# Patient Record
Sex: FEMALE | Race: WHITE
[De-identification: names, ages, dates, MRNs, and addresses within clinical notes are randomized per-mention and may not be internally consistent; named-entity substitution may affect disease eponyms.]

---

## 2017-05-23 ENCOUNTER — HOSPITAL ENCOUNTER (OUTPATIENT)
Dept: HOSPITAL 75 - ONC | Age: 67
LOS: 90 days | Discharge: HOME | End: 2017-08-21
Attending: INTERNAL MEDICINE
Payer: MEDICARE

## 2017-05-23 DIAGNOSIS — F32.9: ICD-10-CM

## 2017-05-23 DIAGNOSIS — Z85.43: ICD-10-CM

## 2017-05-23 DIAGNOSIS — Z79.899: ICD-10-CM

## 2017-05-23 DIAGNOSIS — Z08: Primary | ICD-10-CM

## 2017-05-23 DIAGNOSIS — N18.3: ICD-10-CM

## 2017-05-23 LAB
ALBUMIN SERPL-MCNC: 4.2 G/DL (ref 3.2–4.5)
ALT SERPL-CCNC: 15 U/L (ref 0–55)
ANION GAP SERPL CALC-SCNC: 14 MMOL/L (ref 5–14)
AST SERPL-CCNC: 19 U/L (ref 5–34)
BASOPHILS # BLD AUTO: 0 10^3/UL (ref 0–0.1)
BASOPHILS NFR BLD AUTO: 0 % (ref 0–10)
BILIRUB SERPL-MCNC: 0.3 MG/DL (ref 0.1–1)
BUN SERPL-MCNC: 32 MG/DL (ref 7–18)
BUN/CREAT SERPL: 17
CALCIUM SERPL-MCNC: 10.2 MG/DL (ref 8.5–10.1)
CHLORIDE SERPL-SCNC: 97 MMOL/L (ref 98–107)
CO2 SERPL-SCNC: 29 MMOL/L (ref 21–32)
CREAT SERPL-MCNC: 1.92 MG/DL (ref 0.6–1.3)
EOSINOPHIL # BLD AUTO: 0 10^3/UL (ref 0–0.3)
EOSINOPHIL NFR BLD AUTO: 0 % (ref 0–10)
ERYTHROCYTE [DISTWIDTH] IN BLOOD BY AUTOMATED COUNT: 14.5 % (ref 10–14.5)
GFR SERPLBLD BASED ON 1.73 SQ M-ARVRAT: 26 ML/MIN
GLUCOSE SERPL-MCNC: 124 MG/DL (ref 70–105)
LYMPHOCYTES # BLD AUTO: 1.3 X 10^3 (ref 1–4)
LYMPHOCYTES NFR BLD AUTO: 17 % (ref 12–44)
MAGNESIUM SERPL-MCNC: 1.9 MG/DL (ref 1.8–2.4)
MCH RBC QN AUTO: 33 PG (ref 25–34)
MCHC RBC AUTO-ENTMCNC: 33 G/DL (ref 32–36)
MCV RBC AUTO: 101 FL (ref 80–99)
MONOCYTES # BLD AUTO: 0.8 X 10^3 (ref 0–1)
MONOCYTES NFR BLD AUTO: 11 % (ref 0–12)
NEUTROPHILS # BLD AUTO: 5.3 X 10^3 (ref 1.8–7.8)
NEUTROPHILS NFR BLD AUTO: 71 % (ref 42–75)
PLATELET # BLD: 300 10^3/UL (ref 130–400)
PMV BLD AUTO: 9.9 FL (ref 7.4–10.4)
POTASSIUM SERPL-SCNC: 3 MMOL/L (ref 3.6–5)
PROT SERPL-MCNC: 8.1 G/DL (ref 6.4–8.2)
RBC # BLD AUTO: 3.97 10^6/UL (ref 4.35–5.85)
SODIUM SERPL-SCNC: 140 MMOL/L (ref 135–145)
WBC # BLD AUTO: 7.4 10^3/UL (ref 4.3–11)

## 2017-05-23 PROCEDURE — 80053 COMPREHEN METABOLIC PANEL: CPT

## 2017-05-23 PROCEDURE — 86304 IMMUNOASSAY TUMOR CA 125: CPT

## 2017-05-23 PROCEDURE — 83735 ASSAY OF MAGNESIUM: CPT

## 2017-05-23 PROCEDURE — 99213 OFFICE O/P EST LOW 20 MIN: CPT

## 2017-05-23 PROCEDURE — 36591 DRAW BLOOD OFF VENOUS DEVICE: CPT

## 2017-05-23 PROCEDURE — 85025 COMPLETE CBC W/AUTO DIFF WBC: CPT

## 2018-06-06 ENCOUNTER — HOSPITAL ENCOUNTER (OUTPATIENT)
Dept: HOSPITAL 75 - ONC | Age: 68
LOS: 90 days | Discharge: HOME | End: 2018-09-04
Attending: INTERNAL MEDICINE
Payer: MEDICARE

## 2018-06-06 DIAGNOSIS — Z79.899: ICD-10-CM

## 2018-06-06 DIAGNOSIS — Z85.43: ICD-10-CM

## 2018-06-06 DIAGNOSIS — F32.9: ICD-10-CM

## 2018-06-06 DIAGNOSIS — Z08: Primary | ICD-10-CM

## 2018-06-06 DIAGNOSIS — Z45.2: ICD-10-CM

## 2018-06-06 DIAGNOSIS — N18.3: ICD-10-CM

## 2018-06-06 LAB
ALBUMIN SERPL-MCNC: 3.7 GM/DL (ref 3.2–4.5)
ALP SERPL-CCNC: 91 U/L (ref 40–136)
ALT SERPL-CCNC: 21 U/L (ref 0–55)
BASOPHILS # BLD AUTO: 0 10^3/UL (ref 0–0.1)
BASOPHILS NFR BLD AUTO: 0 % (ref 0–10)
BILIRUB SERPL-MCNC: 0.2 MG/DL (ref 0.1–1)
BUN/CREAT SERPL: 12
CALCIUM SERPL-MCNC: 9.2 MG/DL (ref 8.5–10.1)
CHLORIDE SERPL-SCNC: 110 MMOL/L (ref 98–107)
CO2 SERPL-SCNC: 25 MMOL/L (ref 21–32)
CREAT SERPL-MCNC: 1.37 MG/DL (ref 0.6–1.3)
EOSINOPHIL # BLD AUTO: 0.1 10^3/UL (ref 0–0.3)
EOSINOPHIL NFR BLD AUTO: 3 % (ref 0–10)
ERYTHROCYTE [DISTWIDTH] IN BLOOD BY AUTOMATED COUNT: 12.6 % (ref 10–14.5)
GFR SERPLBLD BASED ON 1.73 SQ M-ARVRAT: 38 ML/MIN
GLUCOSE SERPL-MCNC: 90 MG/DL (ref 70–105)
HCT VFR BLD CALC: 35 % (ref 35–52)
HGB BLD-MCNC: 12.1 G/DL (ref 11.5–16)
LYMPHOCYTES # BLD AUTO: 1.1 X 10^3 (ref 1–4)
LYMPHOCYTES NFR BLD AUTO: 24 % (ref 12–44)
MANUAL DIFFERENTIAL PERFORMED BLD QL: NO
MCH RBC QN AUTO: 35 PG (ref 25–34)
MCHC RBC AUTO-ENTMCNC: 34 G/DL (ref 32–36)
MCV RBC AUTO: 101 FL (ref 80–99)
MONOCYTES # BLD AUTO: 0.4 X 10^3 (ref 0–1)
MONOCYTES NFR BLD AUTO: 8 % (ref 0–12)
NEUTROPHILS # BLD AUTO: 2.9 X 10^3 (ref 1.8–7.8)
NEUTROPHILS NFR BLD AUTO: 65 % (ref 42–75)
PLATELET # BLD: 203 10^3/UL (ref 130–400)
PMV BLD AUTO: 9.5 FL (ref 7.4–10.4)
POTASSIUM SERPL-SCNC: 4.1 MMOL/L (ref 3.6–5)
PROT SERPL-MCNC: 7 GM/DL (ref 6.4–8.2)
RBC # BLD AUTO: 3.49 10^6/UL (ref 4.35–5.85)
SODIUM SERPL-SCNC: 142 MMOL/L (ref 135–145)
WBC # BLD AUTO: 4.4 10^3/UL (ref 4.3–11)

## 2018-06-06 PROCEDURE — 85025 COMPLETE CBC W/AUTO DIFF WBC: CPT

## 2018-06-06 PROCEDURE — 86304 IMMUNOASSAY TUMOR CA 125: CPT

## 2018-06-06 PROCEDURE — 80053 COMPREHEN METABOLIC PANEL: CPT

## 2018-06-06 PROCEDURE — 36415 COLL VENOUS BLD VENIPUNCTURE: CPT

## 2018-06-06 PROCEDURE — 96523 IRRIG DRUG DELIVERY DEVICE: CPT

## 2019-06-06 ENCOUNTER — HOSPITAL ENCOUNTER (OUTPATIENT)
Dept: HOSPITAL 75 - ONC | Age: 69
LOS: 90 days | Discharge: HOME | End: 2019-09-04
Attending: INTERNAL MEDICINE
Payer: MEDICARE

## 2019-06-06 DIAGNOSIS — Z79.899: ICD-10-CM

## 2019-06-06 DIAGNOSIS — N18.3: ICD-10-CM

## 2019-06-06 DIAGNOSIS — F32.9: ICD-10-CM

## 2019-06-06 DIAGNOSIS — Z85.43: ICD-10-CM

## 2019-06-06 DIAGNOSIS — Z08: Primary | ICD-10-CM

## 2019-06-06 LAB
ALBUMIN SERPL-MCNC: 4 GM/DL (ref 3.2–4.5)
ALP SERPL-CCNC: 96 U/L (ref 40–136)
ALT SERPL-CCNC: 20 U/L (ref 0–55)
BASOPHILS # BLD AUTO: 0 10^3/UL (ref 0–0.1)
BASOPHILS NFR BLD AUTO: 0 % (ref 0–10)
BILIRUB SERPL-MCNC: 0.2 MG/DL (ref 0.1–1)
BUN/CREAT SERPL: 11
CALCIUM SERPL-MCNC: 9.5 MG/DL (ref 8.5–10.1)
CHLORIDE SERPL-SCNC: 106 MMOL/L (ref 98–107)
CO2 SERPL-SCNC: 26 MMOL/L (ref 21–32)
CREAT SERPL-MCNC: 1.39 MG/DL (ref 0.6–1.3)
EOSINOPHIL # BLD AUTO: 0.2 10^3/UL (ref 0–0.3)
EOSINOPHIL NFR BLD AUTO: 3 % (ref 0–10)
ERYTHROCYTE [DISTWIDTH] IN BLOOD BY AUTOMATED COUNT: 12.7 % (ref 10–14.5)
GFR SERPLBLD BASED ON 1.73 SQ M-ARVRAT: 38 ML/MIN
GLUCOSE SERPL-MCNC: 168 MG/DL (ref 70–105)
HCT VFR BLD CALC: 36 % (ref 35–52)
HGB BLD-MCNC: 11.7 G/DL (ref 11.5–16)
LYMPHOCYTES # BLD AUTO: 1.4 X 10^3 (ref 1–4)
LYMPHOCYTES NFR BLD AUTO: 26 % (ref 12–44)
MANUAL DIFFERENTIAL PERFORMED BLD QL: NO
MCH RBC QN AUTO: 34 PG (ref 25–34)
MCHC RBC AUTO-ENTMCNC: 33 G/DL (ref 32–36)
MCV RBC AUTO: 103 FL (ref 80–99)
MONOCYTES # BLD AUTO: 0.4 X 10^3 (ref 0–1)
MONOCYTES NFR BLD AUTO: 8 % (ref 0–12)
NEUTROPHILS # BLD AUTO: 3.5 X 10^3 (ref 1.8–7.8)
NEUTROPHILS NFR BLD AUTO: 63 % (ref 42–75)
PLATELET # BLD: 214 10^3/UL (ref 130–400)
PMV BLD AUTO: 9.1 FL (ref 7.4–10.4)
POTASSIUM SERPL-SCNC: 3.7 MMOL/L (ref 3.6–5)
PROT SERPL-MCNC: 7.2 GM/DL (ref 6.4–8.2)
SODIUM SERPL-SCNC: 142 MMOL/L (ref 135–145)
WBC # BLD AUTO: 5.5 10^3/UL (ref 4.3–11)

## 2019-06-06 PROCEDURE — 80053 COMPREHEN METABOLIC PANEL: CPT

## 2019-06-06 PROCEDURE — 86304 IMMUNOASSAY TUMOR CA 125: CPT

## 2019-06-06 PROCEDURE — 85025 COMPLETE CBC W/AUTO DIFF WBC: CPT

## 2019-06-06 PROCEDURE — 99213 OFFICE O/P EST LOW 20 MIN: CPT

## 2019-06-18 ENCOUNTER — HOSPITAL ENCOUNTER (OUTPATIENT)
Dept: HOSPITAL 75 - RAD | Age: 69
End: 2019-06-18
Attending: SURGERY
Payer: MEDICARE

## 2019-06-18 DIAGNOSIS — Z45.2: Primary | ICD-10-CM

## 2019-06-18 PROCEDURE — 71046 X-RAY EXAM CHEST 2 VIEWS: CPT

## 2019-06-18 NOTE — DIAGNOSTIC IMAGING REPORT
INDICATION: Pre port removal.



TIME OF EXAM: 03:16 p.m.



Correlation is made with prior chest from 03/23/2005.



FINDINGS: Left chest wall port has tip overlying the SVC. The

lungs are clear. The pulmonary vascularity is normal. No

infiltrate, effusion, or pneumothorax is detected.



IMPRESSION: No acute cardiopulmonary process is detected.



Dictated by: 



  Dictated on workstation # [id number]

## 2019-06-24 ENCOUNTER — HOSPITAL ENCOUNTER (OUTPATIENT)
Dept: HOSPITAL 75 - PREOP | Age: 69
LOS: 1 days | Discharge: HOME | End: 2019-06-25
Attending: SURGERY
Payer: MEDICARE

## 2019-06-24 VITALS — HEIGHT: 66 IN | WEIGHT: 149.37 LBS | BODY MASS INDEX: 24 KG/M2

## 2019-06-24 DIAGNOSIS — Z01.818: Primary | ICD-10-CM

## 2019-06-26 ENCOUNTER — HOSPITAL ENCOUNTER (OUTPATIENT)
Dept: HOSPITAL 75 - SDC | Age: 69
Discharge: HOME | End: 2019-06-26
Attending: SURGERY
Payer: MEDICARE

## 2019-06-26 VITALS — SYSTOLIC BLOOD PRESSURE: 121 MMHG | DIASTOLIC BLOOD PRESSURE: 82 MMHG

## 2019-06-26 VITALS — DIASTOLIC BLOOD PRESSURE: 67 MMHG | SYSTOLIC BLOOD PRESSURE: 122 MMHG

## 2019-06-26 VITALS — DIASTOLIC BLOOD PRESSURE: 77 MMHG | SYSTOLIC BLOOD PRESSURE: 128 MMHG

## 2019-06-26 VITALS — DIASTOLIC BLOOD PRESSURE: 67 MMHG | SYSTOLIC BLOOD PRESSURE: 124 MMHG

## 2019-06-26 VITALS — DIASTOLIC BLOOD PRESSURE: 66 MMHG | SYSTOLIC BLOOD PRESSURE: 127 MMHG

## 2019-06-26 VITALS — HEIGHT: 66 IN | WEIGHT: 149.37 LBS | BODY MASS INDEX: 24 KG/M2

## 2019-06-26 VITALS — DIASTOLIC BLOOD PRESSURE: 67 MMHG | SYSTOLIC BLOOD PRESSURE: 121 MMHG

## 2019-06-26 VITALS — SYSTOLIC BLOOD PRESSURE: 141 MMHG | DIASTOLIC BLOOD PRESSURE: 66 MMHG

## 2019-06-26 DIAGNOSIS — Z79.82: ICD-10-CM

## 2019-06-26 DIAGNOSIS — E78.5: ICD-10-CM

## 2019-06-26 DIAGNOSIS — K63.5: ICD-10-CM

## 2019-06-26 DIAGNOSIS — N18.3: ICD-10-CM

## 2019-06-26 DIAGNOSIS — Z93.3: ICD-10-CM

## 2019-06-26 DIAGNOSIS — D12.6: ICD-10-CM

## 2019-06-26 DIAGNOSIS — Z11.2: ICD-10-CM

## 2019-06-26 DIAGNOSIS — F32.9: ICD-10-CM

## 2019-06-26 DIAGNOSIS — T82.898A: Primary | ICD-10-CM

## 2019-06-26 DIAGNOSIS — K21.9: ICD-10-CM

## 2019-06-26 DIAGNOSIS — Z79.899: ICD-10-CM

## 2019-06-26 DIAGNOSIS — E78.00: ICD-10-CM

## 2019-06-26 DIAGNOSIS — M10.9: ICD-10-CM

## 2019-06-26 DIAGNOSIS — C78.6: ICD-10-CM

## 2019-06-26 PROCEDURE — 87081 CULTURE SCREEN ONLY: CPT

## 2019-06-26 NOTE — XMS REPORT
Patient Summary (HL7 CCD)

                             Created on: 2017



YARELY ADAME

External Reference #: [id number]

: 1950

Sex: Female



Demographics







                          Address                   [address]

[place]  [address]

 

                          Home Phone                [phone]

 

                          Preferred Language        Unknown

 

                          Marital Status            Unknown

 

                          Advent Affiliation     Unknown

 

                          Race                      White

 

                          Ethnic Group              Not  or 





Author







                          Author                    MANNIE GORDON              Unknown

 

                          Address                   [address]

[place]  [address]



 

                          Phone                     [phone]







Care Team Providers







                    Care Team Member Name    Role                Phone

 

                    ALCON HAMILTON, BLANK BABIN    Attphys             [phone]

 

                    Kirksey EDOUARD, NAIF VILLALOBOS    Prisurg             [phone]

 

                    J., MANNIE          NASST               [phone]

 

                    C., CIRILO          NASST               [phone]

 

                    S., AVELINA LE     NASST               [phone]

 

                    R., BJ          NASST               [phone]

 

                    A., RAJIV          NASST               [phone]

 

                    O., NIDIA ROUSSEAU        NASST               [phone]

 

                    G., ELLYN            NASST               [phone]



                                                                



Allergies

          





             Allergy          Code          Allergy Type          Reaction          Status    

 

             No Known Allergies          0            Drug allergy                       Active    



                                                                                
       



Active Medications

          





           Medication          Code          Dose          Units          Frequency          Route   

                          Modification              Start Date/Time    

 

             Latuda 20MG Oral Tablet          4746705          20           MILLIGRAMS          DAILY 

                    ORAL                                    2016 14:14    

 

                          Prescription Detail           20 MILLIGRAMS ORAL DAILY     

 

             Reglan 10MG Oral Tablet          487247          1            TABLET          TWICE WITH MEALS

                    BY MOUTH                                2016 14:14    

 

                          Prescription Detail           1 TABLET BY MOUTH TWICE WITH MEALS     

 

             Zoloft 100MG Oral Tablet          880871          150          MILLIGRAMS          DAILY

                    ORAL                                    2016 14:14    

 

                          Prescription Detail           150 MILLIGRAMS ORAL DAILY     

 

             Pravastatin 40MG Oral Tablet          146664          40           MILLIGRAMS          AT

 BEDTIME            ORAL                                    2014 13:00    

 

                          Prescription Detail           40 MILLIGRAMS ORAL AT BEDTIME      

 

                Vitamin B12 100MCG/1ML Intramuscular Solution          415276          1               EACH

                MONTHLY          INTRAMUSCULAR                          2014 13:00    

 

                          Prescription Detail           1 EACH INTRAMUSCULAR MONTHLY      

 

                Vitamin D 1000IU Oral Tablet          022532          2000            INTERNATIONAL UNITS

                DAILY           ORAL                            2014 13:00    

 

                          Prescription Detail           2000 INTERNATIONAL UNITS ORAL DAILY      



                                                                                
                                                         



Problems

          





             Problem          Code          Start Date          Resolved Date          Status    

 

             Ileus          42236226          2016                       Active    

 

             Abdominal pain          45500032          2016                       Active    



                                                                                
                 



Procedures

          





                Procedure          Code            Procedure Type          Date    

 

                ABDOMEN ACUTE SERIES          7275433          [facility]          2016    

 

                COMPREHENSIVE METABOLIC PANEL          305231588          [facility]          2016

    

 

                CBC W/ AUTO DIFF (RFLX MAN DIFF IF IND)          8490513          [facility]          2016

    

 

                LIPASE          40092407          [facility]          2016    

 

                LACTIC ACID          8907623          [facility]          2016    

 

                C REACTIVE PROTEIN          07935592          [facility]          2016    

 

                COMPREHENSIVE METABOLIC PANEL          336511977          [facility]          2016

    

 

                CBC W/ AUTO DIFF (RFLX MAN DIFF IF IND)          2405390          [facility]          2016

    

 

                ^CBC W/AUTO DIFF          0847593          [facility]          2016    

 

                ^CBC W/AUTO DIFF          3026449          [facility]          2016    



                                                                                
                                                                                
                



Results

          







                                        COMPREHENSIVE METABOLIC PANEL - Collect Date/Time: 2016 06:00     

 

             Test Name          Code          Test Result          Test Units          Test Ref Range

    

 

             GLUCOSE          2345-7          91           MG/DL          L=70       H=100    

 

             SODIUM          2951-2          143          MEQ/L          L=135      H=148    

 

             POTASSIUM          2823-3          3.6          MEQ/L          L=3.5      H=5.3    

 

             CHLORIDE          2075-0          112          MEQ/L          L=96       H=110    

 

             CO2          2028-9          23           MEQ/L          L=22       H=29    

 

             BUN          3094-0          22           MG/DL          L=8        H=22    

 

             CREATININE          2160-0          1.2          MG/DL          L=0.6      H=1.6    

 

             SGOT/AST          1920-8          15           IU/L          L=10       H=40    

 

             SGPT/ALT          1742-6          12           IU/L          L=8        H=54    

 

             ALK PHOS          6768-6          61           IU/L          L=35       H=115    

 

             TOTAL PROTEIN          2885-2          5.4          G/DL          L=5.5      H=8.5    

 

             ALBUMIN          1751-7          3.1          G/DL          L=3.1      H=5.4    

 

             TOTAL BILI          1975-2          0.4          MG/DL          L=0.0      H=1.5    

 

             CALCIUM          47746-2          8.4          MG/DL          L=8.2      H=10.6    

 

             AGE          [birth year]67-7          66           yrs               

 

             GFR NonAA          60506-3          45                             

 

             GFR AA          17916-8          55                             

 

             eGFR          84000-8          45           mL/min/1.7               

 

             eGFR AA*          72378-7          55           mL/min/1.7               











                                        COMPREHENSIVE METABOLIC PANEL - Collect Date/Time: 2016 06:55     

 

             Test Name          Code          Test Result          Test Units          Test Ref Range

    

 

             GLUCOSE          2345-7          186          MG/DL          L=70       H=100    

 

             SODIUM          2951-2          142          MEQ/L          L=135      H=148    

 

             POTASSIUM          2823-3          4.2          MEQ/L          L=3.5      H=5.3    

 

             CHLORIDE          2075-0          100          MEQ/L          L=96       H=110    

 

             CO2          2028-9          24           MEQ/L          L=22       H=29    

 

             BUN          3094-0          33           MG/DL          L=8        H=22    

 

             CREATININE          2160-0          1.9          MG/DL          L=0.6      H=1.6    

 

             SGOT/AST          1920-8          20           IU/L          L=10       H=40    

 

             SGPT/ALT          1742-6          21           IU/L          L=8        H=54    

 

             ALK PHOS          6768-6          96           IU/L          L=35       H=115    

 

             TOTAL PROTEIN          2885-2          8.7          G/DL          L=5.5      H=8.5    

 

             ALBUMIN          1751-7          4.5          G/DL          L=3.1      H=5.4    

 

             TOTAL BILI          1975-2          0.6          MG/DL          L=0.0      H=1.5    

 

             CALCIUM          92122-9          10.7          MG/DL          L=8.2      H=10.6    

 

             AGE                       66           yrs               

 

             GFR NonAA                       26                             

 

             GFR AA                       32                             

 

             eGFR                       26           mL/min/1.7               

 

             eGFR AA*                       32           mL/min/1.7               











                                        LIPASE - Collect Date/Time: 2016 06:55     

 

             Test Name          Code          Test Result          Test Units          Test Ref Range

    

 

             LIPASE          3040-3          20           U/L          L=8        H=78    











                                        CBC W/ AUTO DIFF (RFLX MAN DIFF IF IND) - Collect Date/Time: 2016 06:00   

  

 

             Test Name          Code          Test Result          Test Units          Test Ref Range

    

 

             WBC          57594-8          3.0          TH/CMM          L=4.5      H=10.8    

 

             RBC          789-8          3.05          ML/CMM          L=4.20     H=5.40    

 

             HGB          718-7          9.8          G/DL          L=12.0     H=16.0    

 

             HCT          4544-3          32.1          %            L=37.0     H=47.0    

 

             MCV          71924-3          105          FL           L=81       H=99    

 

             MCH          97393-4          32.1          PG           L=27.0     H=33.0    

 

             MCHC          21037-0          30.5          G/DL          L=31.0     H=36.0    

 

             RDW SD          17834-6          54           FL           L=36       H=50    

 

             RDW CV          80140-4          14.2          %            L=0.0      H=14.8    

 

             MPV          66723-2          10.1          FL           L=9.3      H=12.5    

 

             PLT          777-3          170          TH/CMM          L=130      H=440    

 

             NRBC#          87786-3          0.00          TH/CMM          L=0.00     H=0.00    

 

             NRBC%          25273-8          0.0          /100WBC          L=0.0      H=2.0    

 

             %NEUT          42175-3          50.7          %                 

 

             %LYMP          74755-9          32.6          %                 

 

             %MONO          84566-4          10.5          %                 

 

             %EOS          43898-6          5.9          %                 

 

             %BASO          81633-9          0.3          %                 

 

             #NEUT          42587-2          1.54          TH/CMM          L=2.10     H=8.20    

 

             #LYMP          55872-0          0.99          TH/CMM          L=0.90     H=5.20    

 

             #MONO          69437-7          0.32          TH/CMM          L=0.16     H=1.00    

 

             #EOS          66614-1          0.18          TH/CMM          L=0.00     H=0.80    

 

             #BASO          20187-6          0.01          TH/CMM          L=0.00     H=0.20    

 

             MANUAL DIFF          81356-0          NOT IND          N/A               











                                        CBC W/ AUTO DIFF (RFLX MAN DIFF IF IND) - Collect Date/Time: 2016 06:55   

  

 

             Test Name          Code          Test Result          Test Units          Test Ref Range

    

 

             WBC          06796-0          8.4          TH/CMM          L=4.5      H=10.8    

 

             RBC          789-8          4.38          ML/CMM          L=4.20     H=5.40    

 

             HGB          718-7          14.4          G/DL          L=12.0     H=16.0    

 

             HCT          4544-3          43.3          %            L=37.0     H=47.0    

 

             MCV                       99           FL           L=81       H=99    

 

             MCH                       32.9          PG           L=27.0     H=33.0    

 

             MCHC                       33.3          G/DL          L=31.0     H=36.0    

 

             RDW SD                       50           FL           L=36       H=50    

 

             RDW CV                       13.8          %            L=0.0      H=14.8    

 

             MPV                       9.6          FL           L=9.3      H=12.5    

 

             PLT          777-3          252          TH/CMM          L=130      H=440    

 

             NRBC#                       0.00          TH/CMM          L=0.00     H=0.00    

 

             NRBC%                       0.0          /100WBC          L=0.0      H=2.0    

 

             %NEUT                       81.8          %                 

 

             %LYMP                       12.1          %                 

 

             %MONO                       5.6          %                 

 

             %EOS                       0.1          %                 

 

             %BASO                       0.2          %                 

 

             #NEUT                       6.89          TH/CMM          L=2.10     H=8.20    

 

             #LYMP                       1.02          TH/CMM          L=0.90     H=5.20    

 

             #MONO                       0.47          TH/CMM          L=0.16     H=1.00    

 

             #EOS                       0.01          TH/CMM          L=0.00     H=0.80    

 

             #BASO                       0.02          TH/CMM          L=0.00     H=0.20    

 

             MANUAL DIFF                       NOT IND          N/A               











                                        C REACTIVE PROTEIN - Collect Date/Time: 2016 06:55     

 

             Test Name          Code          Test Result          Test Units          Test Ref Range

    

 

             C REACTIVE PROTEIN          1988-5          1.4          MG/DL          L=0.0      H=1.0

    











                                        LACTIC ACID - Collect Date/Time: 2016 06:55     

 

             Test Name          Code          Test Result          Test Units          Test Ref Range

    

 

             LACTIC ACID          2524-7          2.2          mmol/L          L=0.5      H=1.6    



                                                                                
                                                                   



Function Status

          Unknown or Not Available.                                             
                                



History of Immunizations

          





                    Immunization          Code                Date    

 

                    Pneumococcal conjugate PCV 13          133                 2015    

 

                    influenza, injectable, quadrivalent, preservative free          150                 2016

    



                                                                                
       



Plan of Treatment

          Unknown or Not Available.                                             
                      



Social History

          





                Smoking Status          Code            Start Date          End Date    

 

                Never smoker          [id number]                               



                                                                                
       



Vital Signs

          





             Vital Sign          Value          Unit          Date/Time          Recent/Initial?    

 

             Weight Measured          183.01          [lb_av]          2016 09:10          Initial

 VS    

 

             Height          67           [in_i]          2016 09:10          Initial VS    

 

             BMI (Body Mass Index)          28.66          kg/m2          2016 09:10          Initial

 VS    

 

             BSA (Body Surface Area)          1.98          m2           2016 09:10          Initial

 VS    

 

             BP Systolic          152          mm[Hg]          2016 09:10          Initial VS  

  

 

             BP Diastolic          86           mm[Hg]          2016 09:10          Initial VS  

  

 

             Respiratory Rate          16           /min          2016 09:10          Initial VS

    

 

             Heart Rate          98           /min          2016 09:10          Initial VS    

 

             O2 % BldC Oximetry          95           %            2016 09:10          Initial VS 

   

 

             Body Temperature          99.2          [degF]          2016 09:10          Initial

 VS    

 

             Weight Measured          183.01          [lb_av]          2016 09:15          Most

 Recent VS    

 

             Height          67           [in_i]          2016 09:15          Most Recent VS    



 

             BMI (Body Mass Index)          28.66          kg/m2          2016 09:15          Most

 Recent VS    

 

             BSA (Body Surface Area)          1.98          m2           2016 09:15          Most

 Recent VS    

 

             BP Systolic          113          mm[Hg]          2016 11:21          Most Recent 

VS    

 

             BP Diastolic          66           mm[Hg]          2016 11:21          Most Recent 

VS    

 

             Respiratory Rate          18           /min          2016 11:21          Most Recent

 VS    

 

             Heart Rate          80           /min          2016 11:21          Most Recent VS  

  

 

             O2 % BldC Oximetry          99           %            2016 11:21          Most Recent

 VS    

 

             Body Temperature          97.5          [degF]          2016 11:21          Most Recent

 VS    



                                                                                
                                                                    



Function Status

          Unknown or Not Available.                                             
   



Goals

          Unknown or Not Available.                                             
             



ASSESSMENTS

          Unknown or Not Available.                                             
                       



Health Concerns Section

          Unknown or Not Available.

## 2019-06-26 NOTE — XMS REPORT
MU2 Ambulatory Summary

                             Created on: 2017



Pauline Gan

External Reference #: [id number]

: 1950

Sex: Female



Demographics







                          Address                   [address]

[place]  [id number]

 

                          Home Phone                [phone]

 

                          Preferred Language        English

 

                          Marital Status            Legally 

 

                          Sikhism Affiliation     Unknown

 

                          Race                      White

 

                          Ethnic Group              Not  or 





Author







                          Author                    Bhupinder Louise

 

                          [facility]

 

                          Address                   [address]

[place]  [id number]



 

                          Phone                     [phone]







Care Team Providers







                    Care Team Member Name    Role                Phone

 

                    Bhupinder Louise    PCP                 Unavailable

 

                    Bhupinder Louise    PreferredProvider    Unavailable







Allergies and Adverse Reactions







                    Name                Reaction            Notes

 

                    NO KNOWN DRUG ALLERGIES                         







Plan of Treatment







             Planned Activity    Comments     Planned Date    Planned Time    Plan/Goal

 

             Swallowing evaluation                 2017    12:00 AM      

 

             Urinalysis with C/S If Indicated.                 2017    12:00 AM      







Medications







                                        Active 

 

             Name         Start Date    Estimated Completion Date    SIG          Comments

 

                Latuda 20 mg oral tablet                                    take 1 tablet (20 mg) by oral route once daily with

 food (at least 350 calories)            

 

             pravastatin 40 mg oral tablet    3/30/2015                 TAKE 1 TABLET BY MOUTH DAILY     

 

                Namenda XR 28 mg oral capsule,sprinkle,ER 24hr    2015                       take 1 capsule (28

 mg) by oral route once daily            

 

                Namenda XR 28 mg oral capsule,sprinkle,ER 24hr    2016                       take 1 capsule (28

 mg) by oral route once daily            

 

                potassium chloride 10 mEq oral tablet extended release    2016                       take 1 tablet

 (10 meq) by oral route once daily       

 

             pravastatin 40 mg oral tablet    2016                 TAKE 1 TABLET BY MOUTH DAILY     

 

                Vitamin B-12 1,000 mcg/mL injection solution    2016                       inject 1 milliliter 

(1,000 mcg) by intramuscular route once a month     

 

                potassium chloride 10 mEq oral tablet extended release    2016                      take 1 tablet

 (10 meq) by oral route once daily       

 

                Namenda XR 28 mg oral capsule,sprinkle,ER 24hr    2016                      TAKE 1 CAPSULE BY

 MOUTH EVERY DAY                         

 

                furosemide 40 mg oral tablet    2016                      take 1 tablet (40 mg) by oral route

 once daily                              

 

                mirtazapine 45 mg oral tablet                                    take 1 tablet (45 mg) by oral route once daily

 before bedtime                          

 

             Fish Oil 300-1,000 mg oral capsule                              take 1 capsule by oral route daily     

 

             Vitamin D3 1,000 unit oral tablet                              take 1 tablet by oral route daily     

 

                Calcium 600 600 mg calcium (1,500 mg) oral tablet                                    take 1 tablet by oral route

 daily                                   

 

                Aspirin Low Dose 81 mg oral tablet,delayed release (DR/EC)                                    take 1 tablet 

(81 mg) by oral route once daily         









                                         

 

             Name         Start Date    Expiration Date    SIG          Comments

 

             Reglan 10mg    3/29/2010    2010    one ac and hs     

 

                Keflex 500 mg oral capsule    2010       10/1/2010       take 1 capsule (500 mg) by oral

 route every 6 hours for 10 days         

 

                Bactrim -160 mg oral tablet    2011       take 1 tablet by oral route

 every 12 hours for 7 days               

 

                triamcinolone acetonide 0.1 % topical cream    2011      apply a thin

 layer to the affected area(s) by topical route 2 times per day     

 

                sertraline 100 mg oral tablet    4/10/2012       5/10/2012       take 1.5 tablets by oral route

 daily for 30 days                       

 

                ergocalciferol (vitamin D2) 50,000 unit oral capsule    4/15/2013       2013       TAKE

 ONE CAPSULE BY MOUTH ONCE A WEEK        

 

                CYANOCOBALAM 1000MCGINJ 1000 milliliter    2013       INJECT 1ML INTRAMUSCULAR

 ONCE A MONTH                            

 

                pravastatin 40 mg oral tablet    3/25/2014       3/20/2015       TAKE ONE TABLET BY MOUTH EVERY

 DAY                                     

 

                          Zostavax (PF) 19,400 unit/0.65 mL subcutaneous suspension for reconstitution    3/23/2015

                    3/24/2015           inject 0.65 milliliter by subcutaneous route once     

 

                famciclovir 500 mg oral tablet    12/3/2015       12/10/2015      take 1 tablet (500 mg) by

 oral route every 8 hours for 7 days     

 

                furosemide 40 mg oral tablet    2016      take 1 tablet (40 mg) by oral

 route once daily                        

 

                Cipro 500 mg oral tablet    2016       take 1 tablet (500 mg) by oral route

 2 times per day for 5 days              

 

                Bactrim -160 mg oral tablet    2016        take 1 tablet by oral route

 every 12 hours for 7 days               

 

                metoclopramide HCl 10 mg oral tablet    2017       take 1 tablet by oral

 route 2 times a day for 50 days         

 

                Macrobid 100 mg oral capsule    2017       take 1 capsule (100 mg) by oral

 route 2 times per day with food for 7 days     

 

                Augmentin 875-125 mg oral tablet    2017       take 1 tablet by oral route

 every 12 hours for 7 days               

 

                amoxicillin 500 mg oral tablet    2017       take 1 tablet (500 mg) by oral

 route every 12 hours for 7 days         









                                        Discontinued 

 

             Name         Start Date    Discontinued Date    SIG          Comments

 

                Tylenol 325 mg oral tablet                    2013        take 1 - 2 tablets (325 -650 mg) by oral

 route every 4-6 hours as needed         

 

                Calcium 600 + D(3) 600 mg(1,500mg) -400 unit oral tablet                    2011       take 1 tablet

 by oral route 2 times a day            no longer taking

 

                Vitamin B-12 1,000 mcg oral tablet extended release    2010       take 1

 tablet by oral route daily             no longer taking

 

                Antifungal (clotrimazole) 1 % topical cream    2010       apply to the 

affected and surrounding areas of skin by topical route 2 times per day morning 
and evening                              

 

                sertraline 100 mg oral tablet    5/10/2011       2011       take 2 tablets (200 mg) by 

oral route once daily                   discontinued by Dr. Serrano

 

                mirtazapine 15 mg oral tablet                    2011        take 1 tablet (15 mg) by oral route 

once daily before bedtime               Dr. Serrano

 

                mirtazapine 15 mg oral tablet                    2011        take 1 tablet (15 mg) by oral route 

once daily before bedtime               dc'd by Dr. Serrano

 

                Pristiq 50 mg oral tablet extended release 24 hr                    2013        take 1 tablet (50

 mg) by oral route once daily           Dr. Zach Sarahstiq 50 mg oral tablet extended release 24 hr                    2013        take 1 tablet (50

 mg) by oral route once daily           dose updated

 

                Vitamin B-12 1,000 mcg/mL injection solution    2011        inject 1 milliliter

 (1,000 mcg) by intramuscular route once a month    on list already

 

                    syringe with needle 1 mL 25 gauge x 1" miscellaneous syringe    2011

                          use for injection once a month     

 

                clotrimazole 1 % topical cream    2011        apply to the affected and surrounding

 areas of skin by topical route 2 times per day in the morning and evening     

 

                Vitamin D2 50,000 unit oral capsule    2011        take 1 capsule (50,000

 unit) by oral route once weekly        generic on list

 

                Pravachol 40 mg oral tablet    2012        take 1 tablet (40 mg) by oral 

route once daily for 90 days            generic on list

 

                lithium carbonate 300 mg oral capsule    2012        take 1 capsule by oral

 route daily                            dose updated

 

                Pristiq 100 mg oral tablet extended release 24 hr                    4/10/2012       take 1 and 1/2 

tablet (150 mg) by oral route once daily    Mental Health provider

 

                Pristiq 100 mg oral tablet extended release 24 hr                    4/10/2012       take 1 and 1/2 

tablet (150 mg) by oral route once daily    Discontinued by Dr Efrain Knight at [facility]

 

                hydroxyzine HCl 50 mg oral tablet    10/16/2014      2015       take 1 tablet (50 mg) 

by oral route at bedtime                 

 

                lithium carbonate 300 mg oral capsule    2015       take 1 capsule (300

 mg) by oral route 2 for 30 days         

 

                fluconazole 100 mg oral tablet    2015       12/3/2015       take 1 tablet (100 mg) by 

oral route once a week                   

 

                ketoconazole 2 % topical cream    2015       12/3/2015       apply to the affected area(s)

 by topical route 2 times per day        

 

                prednisone 10 mg oral tablet    12/3/2015       2016        take 2 tablets (20 mg) by oral

 route once daily for 4 days 1 tablet daily for 4 days 0.5 tablet daily for 4 
days                                     

 

                triamcinolone acetonide 0.1 % topical cream    2016       apply a thin layer

 to the affected area(s) by topical route 2 times per day     

 

                Cipro 500 mg oral tablet    1/15/2017       2017       take 1 tablet (500 mg) by oral route

 every 12 hours for 10 days              







Problem List







                    Description         Status              Onset

 

                    Artificial opening status; colostomy    Active               

 

                    Bipolar disorder, unspecified    Active               

 

                    Hyperlipidemia      Active               

 

                    Peritoneal Neoplasm, Malignant    Active               

 

                    Anemia, Pernicious    Active               

 

                    Arthritis unspecified    Active               

 

                    B12 deficiency      Active               







Vital Signs







      Date    Time    BP-Sys(mm[Hg]    BP-Lynn(mm[Hg])    HR(bpm)    RR(rpm)    Temp    WT    HT    HC    BMI

                    BSA                 BMI Percentile      O2 Sat(%)

 

        2017    3:05:00 PM    134 mmHg    70 mmHg    70 bpm    20 rpm    97.4 F    181 lbs    69 in

                          26.73 kg/m2    2.00 m2                   98 %

 

        2017    11:07:00 AM    124 mmHg    64 mmHg    62 bpm    17 rpm    98.2 F    181.2 lbs    69

 in                       26.7583 kg/m    2.0003 m                 98 %

 

        1/15/2017    3:34:00 PM    148 mmHg    89 mmHg    69 bpm    20 rpm    98.2 F    179 lbs    69 in

                          26.43 kg/m2    1.99 m2                   98 %

 

       2017    1:51:00 PM    160 mmHg    90 mmHg    100 bpm    20 rpm    96.5 F    179 lbs             

                                                                98 %

 

       2016    3:11:00 PM    134 mmHg    76 mmHg    80 bpm    20 rpm    98 F    163 lbs    69 in     

                24.07 kg/m2     1.90 m2                         98 %

 

        2016    2:04:00 PM    142 mmHg    86 mmHg    68 bpm    16 rpm    98.5 F    166 lbs    63 in

                          29.4053 kg/m    1.8295 m                 100 %

 

        2016    11:27:00 AM    148 mmHg    78 mmHg    90 bpm    20 rpm    98.2 F    153 lbs    69 in

                          22.59 kg/m2    1.84 m2                   96 %

 

        12/3/2015    9:50:00 AM    132 mmHg    70 mmHg    62 bpm    16 rpm    97.9 F    145 lbs    69 in

                          21.4125 kg/m    1.7894 m                 100 %

 

        2015    8:52:00 AM    132 mmHg    68 mmHg    52 bpm    20 rpm    97.8 F    141 lbs    69 in

                          20.82 kg/m2    1.76 m2                   100 %

 

        2015    3:25:00 PM    120 mmHg    62 mmHg    72 bpm    16 rpm    98.1 F    136 lbs    69 in

                          20.0835 kg/m    1.733 m                 98 %

 

       3/23/2015    2:55:00 PM    130 mmHg    76 mmHg    68 bpm    18 rpm    97 F    140 lbs    69 in    

                20.67 kg/m2     1.76 m2                         98 %

 

        10/16/2014    11:11:00 AM    120 mmHg    66 mmHg    77 bpm    20 rpm    98 F    130 lbs    69 in

                          19.1974 kg/m    1.6943 m                 100 %

 

        2014    3:21:00 PM    130 mmHg    66 mmHg    63 bpm    18 rpm    97.2 F    160 lbs    69 in

                          23.6276 kg/m    1.88 m2                   99 %

 

        2013    10:35:00 AM    132 mmHg    70 mmHg    66 bpm    20 rpm    98.1 F    157 lbs    69 in

                          23.18 kg/m2    1.862 m                  

 

        2013    1:29:00 PM    132 mmHg    70 mmHg    76 bpm    18 rpm    98.2 F    166 lbs    69 in 

                          24.5137 kg/m    1.91 m2                    

 

       2013    2:46:00 PM    128 mmHg    70 mmHg    76 bpm    16 rpm    98 F    160 lbs    69 in     

                23.63 kg/m2     1.8797 m                       

 

        2011    8:49:00 AM    128 mmHg    78 mmHg    70 bpm    18 rpm    97.9 F    164 lbs    69 in

                          24.2183 kg/m    1.90 m2                    

 

     2011    1:31:00 PM    132 mmHg    68 mmHg    84 bpm         97 F    167 lbs                        

                                         

 

        2011    9:09:00 AM    128 mmHg    70 mmHg    72 bpm    18 rpm    98.2 F    163 lbs    64 in 

                          27.9786 kg/m    1.83 m2                    

 

       2011    10:01:00 AM    132 mmHg    70 mmHg    72 bpm    18 rpm    98.2 F    154 lbs             

                                                                 

 

       2011    2:47:00 PM    128 mmHg    70 mmHg    72 bpm    18 rpm    97.8 F    156 lbs             

                                                                 

 

       5/10/2011    3:16:00 PM    144 mmHg    80 mmHg    72 bpm    18 rpm    98.2 F    158 lbs             

                                                                 

 

        2011    10:11:00 AM    132 mmHg    70 mmHg    70 bpm    18 rpm    98.2 F    168 lbs    69 in

                          24.809 kg/m    1.93 m2                    

 

        4/15/2011    10:52:00 AM    110 mmHg    60 mmHg    75 bpm    16 rpm    97.5 F    172.375 lbs    

69 in                     25.46 kg/m2    1.951 m                 100 %

 

        2011    11:43:00 AM    120 mmHg    82 mmHg    75 bpm    16 rpm    97.2 F    178.5 lbs    69

 in                       26.3596 kg/m    1.99 m2                   100 %

 

        10/15/2010    1:32:00 PM    120 mmHg    70 mmHg    80 bpm    18 rpm    96.6 F    177 lbs    69 in

                          26.14 kg/m2    1.977 m                 100 %

 

        2010    3:50:00 PM    168 mmHg    100 mmHg    82 bpm    18 rpm    97.8 F    177.5 lbs    69

 in                       26.2119 kg/m    1.98 m2                   97 %

 

        2010    1:21:00 PM    140 mmHg    80 mmHg    59 bpm    16 rpm    97.6 F    173.25 lbs    69 

in                        25.58 kg/m2    1.956 m                 100 %

 

        2010    3:02:00 PM    140 mmHg    80 mmHg    61 bpm    16 rpm    97.6 F    173.125 lbs    69

 in                       25.5658 kg/m    1.96 m2                   99 %

 

        2010    1:23:00 PM    130 mmHg    80 mmHg    66 bpm    16 rpm    96.8 F    173 lbs    69 in 

                          25.55 kg/m2    1.9546 m                 100 %

 

        2010    12:58:00 PM    130 mmHg    88 mmHg    75 bpm    16 rpm    98.4 F    172.25 lbs    69

 in                       25.4366 kg/m    1.95 m2                   100 %







Social History







                    Name                Description         Comments

 

                    denies alcohol use                         

 

                    denies smoking                           

 

                    Denies illicit substance abuse                         

 

                    retired                                 direct care

 

                    Single                                   

 

                    Exercises regularly                         

 

                    Attended some college                         

 

                    Tobacco             Never smoker         







History of Procedures







                    Date Ordered        Description         Order Status

 

                    2010 12:00 AM    COMPREHEN METABOLIC PANEL    Reviewed

 

                    2010 12:00 AM    COMPLETE CBC W/AUTO DIFF WBC    Reviewed

 

                    2010 12:00 AM    LIPID PANEL         Reviewed

 

                          2015 12:00 AM        B12 Injection, Up to 1000 Mcg NDC#3834-0100-04 [facility] Medicare 

                                        Reviewed

 

                    2011 12:00 AM    MAMMOGRAM SCREENING    Reviewed

 

                    2011 12:00 AM    CYTOPATH C/V THIN LAYER    Reviewed

 

                    2011 12:00 AM    B12 Injection 1 cc NDC#[address]-52    Reviewed

 

                    2015 12:00 AM    THER/PROPH/DIAG INJ SC/IM    Reviewed

 

                    2015 12:00 AM    B12 Injection, Up to 1000 Mcg NDC#0504-6969-62    Reviewed

 

                    2011 12:00 AM    THER/PROPH/DIAG INJ SC/IM    Reviewed

 

                    2011 12:00 AM    B12 Injection([place]) Ndc#8356-5900-87-    Reviewed

 

                    2015 12:00 AM    THER/PROPH/DIAG INJ SC/IM    Reviewed

 

                    2015 12:00 AM    B12 Injection, Up to 1000 Mcg NDC#2453-8077-80    Reviewed

 

                    10/20/2011 12:00 AM    THER/PROPH/DIAG INJ SC/IM    Reviewed

 

                    10/20/2011 12:00 AM    B12 Injection([place]) Ndc#7935-2504-57-    Reviewed

 

                    2016 12:00 AM    THER/PROPH/DIAG INJ SC/IM    Reviewed

 

                    2016 12:00 AM    B12 Injection, Up to 1000 Mcg NDC#6539-9487-96    Reviewed

 

                    3/14/2016 12:00 AM    VITAMIN B-12        Reviewed

 

                    3/15/2016 12:00 AM    THER/PROPH/DIAG INJ SC/IM    Reviewed

 

                    3/15/2016 12:00 AM    B12 Injection, Up to 1000 Mcg NDC#6543-1860-77    Reviewed

 

                    2011 12:00 AM    ***Immunization administration, Medicare flu    Reviewed

 

                    2011 12:00 AM    Fluzone ** MEDICARE Only **    Reviewed

 

                    2011 12:00 AM    THER/PROPH/DIAG INJ SC/IM    Reviewed

 

                    2011 12:00 AM    B12 Injection ([employer] [facility]) Ndc#8180-3830-48    Reviewed

 

                    2016 12:00 AM    B12 Injection, Up to 1000 Mcg NDC#4330-4669-64 [facility] Medicare    

Reviewed

 

                    2016 12:00 AM    TTE W/DOPPLER COMPLETE    Reviewed

 

                    2016 12:00 AM    EXTREMITY STUDY     Reviewed

 

                          2016 12:00 AM        B12 Injection, Up to 1000 Mcg NDC#7785-0865-89 [facility] Medicare 

                                        Reviewed

 

                    2016 12:00 AM    THER/PROPH/DIAG INJ SC/IM    Reviewed

 

                    2016 12:00 AM    B12 Injection, Up to 1000 Mcg NDC#0016-4694-52    Reviewed

 

                    2016 12:00 AM    THER/PROPH/DIAG INJ SC/IM    Reviewed

 

                    2012 12:00 AM    B12 Injection([place]) Ndc#4656-1450-42-    Reviewed

 

                    2016 12:00 AM    B12 Injection, Up to 1000 Mcg NDC#3429-3585-90    Reviewed

 

                    2016 12:00 AM    THER/PROPH/DIAG INJ SC/IM    Reviewed

 

                    2012 12:00 AM    THER/PROPH/DIAG INJ SC/IM    Reviewed

 

                    2012 12:00 AM    B12 Injection ([employer]) Ndc#9811-0340-75    Reviewed

 

                    2016 12:00 AM    THER/PROPH/DIAG INJ SC/IM    Reviewed

 

                    2016 12:00 AM    B12 Injection, Up to 1000 Mcg NDC#3857-5642-32    Reviewed

 

                    2016 12:00 AM    B12 Injection, Up to 1000 Mcg NDC#9020-1834-06    Reviewed

 

                    2016 12:00 AM    THER/PROPH/DIAG INJ SC/IM    Reviewed

 

                    2012 12:00 AM    THER/PROPH/DIAG INJ SC/IM    Reviewed

 

                    2012 12:00 AM    B12 Injection([place]) Ndc#6319-8923-75-    Reviewed

 

                    12/15/2016 12:00 AM    B12 Injection, Up to 1000 Mcg NDC#4336-6535-34    Reviewed

 

                    12/15/2016 12:00 AM    THER/PROPH/DIAG INJ SC/IM    Reviewed

 

                    2016 12:00 AM    URNLS DIP STICK/TABLET RGNT AUTO W/O MICROSCOPY    Returned

 

                    1/3/2017 12:00 AM    URNLS DIP STICK/TABLET RGNT AUTO W/O MICROSCOPY    Returned

 

                    2017 12:00 AM    URINE CULTURE/COLONY COUNT    Returned

 

                    2017 12:00 AM    Rocephin 1 gram Injection, [facility] Medicare    Reviewed

 

                    2017 12:00 AM    THERAPEUTIC PROPHYLACTIC/DX INJECTION SUBQ/IM    Reviewed

 

                    2017 12:00 AM    B12 1000mcg Injection, [facility] Medicare    Reviewed

 

                    5/3/2012 12:00 AM    THER/PROPH/DIAG INJ SC/IM    Reviewed

 

                    5/3/2012 12:00 AM    B12 Injection([place]) Ndc#3643-5577-74-    Reviewed

 

                    2017 12:00 AM    THERAPEUTIC PROPHYLACTIC/DX INJECTION SUBQ/IM    Reviewed

 

                    2017 12:00 AM    B12 1000mcg Injection, [facility]    Reviewed

 

                    2017 12:00 AM    MRI BRAIN STEM W/O & W/DYE    Reviewed

 

                    2017 12:00 AM    VITAMIN B-12        Reviewed

 

                    2017 12:00 AM    Speech Therapy Consult    Reviewed

 

                    2017 12:00 AM    ASSAY OF CREATININE    Reviewed

 

                    2012 12:00 AM    IMMUNOTHERAPY INJECTIONS    Reviewed

 

                    2012 12:00 AM    B12 Injection([place]) Ndc#9719-9618-72-    Reviewed

 

                    2012 12:00 AM    THER/PROPH/DIAG INJ SC/IM    Reviewed

 

                    2012 12:00 AM    B12 Injection, Up to 1000 Mcg NDC#1158-1696-60    Reviewed

 

                    2012 12:00 AM    THER/PROPH/DIAG INJ SC/IM    Reviewed

 

                    2012 12:00 AM    B12 Injection, Up to 1000 Mcg NDC#5771-1061-36    Reviewed

 

                    2012 12:00 AM    THER/PROPH/DIAG INJ SC/IM    Reviewed

 

                    2012 12:00 AM    B12 Injection, Up to 1000 Mcg NDC#8054-6142-77    Reviewed

 

                    10/16/2012 12:00 AM    THER/PROPH/DIAG INJ SC/IM    Reviewed

 

                    10/16/2012 12:00 AM    B12 Injection, Up to 1000 Mcg NDC#9052-8196-30    Reviewed

 

                    2010 12:00 AM    COMPREHEN METABOLIC PANEL    Reviewed

 

                    2010 12:00 AM    COMPLETE CBC W/AUTO DIFF WBC    Reviewed

 

                    2010 12:00 AM    LIPID PANEL         Reviewed

 

                    2013 12:00 AM    Flu Injection 3 Years And Above NDC# [address]-42  [facility]    Reviewed



 

                    2013 12:00 AM    COMPLETE CBC W/AUTO DIFF WBC    Reviewed

 

                    2013 12:00 AM    ASSAY OF LITHIUM    Reviewed

 

                    2013 12:00 AM    METABOLIC PANEL TOTAL CA    Reviewed

 

                    4/3/2013 12:00 AM    THER/PROPH/DIAG INJ SC/IM    Reviewed

 

                    4/3/2013 12:00 AM    B12 Injection, Up to 1000 Mcg NDC#1273-5660-49    Reviewed

 

                    2013 12:00 AM    THER/PROPH/DIAG INJ SC/IM    Reviewed

 

                    2013 12:00 AM    B12 Injection, Up to 1000 Mcg NDC#1720-4827-10    Reviewed

 

                    2013 12:00 AM    THER/PROPH/DIAG INJ SC/IM    Reviewed

 

                    2013 12:00 AM    B12 Injection, Up to 1000 Mcg NDC#8213-3613-39    Reviewed

 

                    2013 12:00 AM    LIPID PANEL         Reviewed

 

                    2013 12:00 AM    VITAMIN D 25 HYDROXY    Reviewed

 

                    2013 12:00 AM    THER/PROPH/DIAG INJ SC/IM    Reviewed

 

                    2013 12:00 AM    B12 Injection, Up to 1000 Mcg NDC#3675-7649-94    Reviewed

 

                    2013 12:00 AM    THER/PROPH/DIAG INJ SC/IM    Reviewed

 

                    3/6/2014 12:00 AM    THER/PROPH/DIAG INJ SC/IM    Reviewed

 

                    2014 12:00 AM    THER/PROPH/DIAG INJ SC/IM    Reviewed

 

                    2014 12:00 AM    B12 Injection, Up to 1000 Mcg NDC#4224-2530-95    Reviewed

 

                    2010 12:00 AM    SKIN FUNGI CULTURE    Reviewed

 

                    10/9/2010 12:00 AM    COMPREHEN METABOLIC PANEL    Reviewed

 

                    10/9/2010 12:00 AM    LIPID PANEL         Reviewed

 

                    2010 12:00 AM    THER/PROPH/DIAG INJ SC/IM    Reviewed

 

                    2010 12:00 AM    B12 Injection Ndc#[address]-35 ([place])    Reviewed

 

                    2010 12:00 AM    THER/PROPH/DIAG INJ SC/IM    Reviewed

 

                    2010 12:00 AM    Kenalog 40 Mg Im-Ndc#[address]-92 ([facility])    Reviewed

 

                    10/15/2010 12:00 AM    FLU VACCINE 3 YRS & > IM    Reviewed

 

                    10/15/2010 12:00 AM    Admin.Of M/C Cov.Vaccine-Flu Vacc.    Reviewed

 

                    1/15/2011 12:00 AM    COMPLETE CBC W/AUTO DIFF WBC    Reviewed

 

                    1/15/2011 12:00 AM    COMPREHEN METABOLIC PANEL    Reviewed

 

                    1/15/2011 12:00 AM    LIPID PANEL         Reviewed

 

                    2014 12:00 AM    MAMMOGRAM SCREENING    Reviewed

 

                    2014 12:00 AM    Screening mammography, bilateral    Reviewed

 

                    7/10/2014 12:00 AM    THER/PROPH/DIAG INJ SC/IM    Reviewed

 

                    7/10/2014 12:00 AM    B12 Injection, Up to 1000 Mcg NDC#8137-5933-80    Reviewed

 

                    2011 12:00 AM    COMPLETE CBC W/AUTO DIFF WBC    Reviewed

 

                    2011 12:00 AM    COMPREHEN METABOLIC PANEL    Reviewed

 

                    2011 12:00 AM    LIPID PANEL         Reviewed

 

                    2014 12:00 AM    B12 Injection, Up to 1000 Mcg NDC#2089-7465-63    Reviewed

 

                    10/19/2014 12:00 AM    MAMMOGRAM SCREENING    Reviewed

 

                    10/19/2014 12:00 AM    Screening mammography, bilateral    Reviewed

 

                    10/16/2014 12:00 AM    B12 Injection, Up to 1000 Mcg NDC#5981-3528-75    Reviewed

 

                    10/16/2014 12:00 AM    COMPLETE CBC W/AUTO DIFF WBC    Reviewed

 

                    10/16/2014 12:00 AM    COMPREHEN METABOLIC PANEL    Reviewed

 

                    10/16/2014 12:00 AM    IMMUNOASSAY TUMOR     Reviewed

 

                    10/16/2014 12:00 AM    LIPID PANEL         Reviewed

 

                    10/16/2014 12:00 AM    ASSAY OF LITHIUM    Reviewed

 

                    10/16/2014 12:00 AM    MAMMOGRAM SCREENING    Reviewed

 

                    2011 12:00 AM    ASSAY OF PARATHORMONE    Reviewed

 

                    2011 12:00 AM    VITAMIN D 25 HYDROXY    Reviewed

 

                    2011 12:00 AM    ASSAY OF LITHIUM    Reviewed

 

                    2011 12:00 AM    METABOLIC PANEL TOTAL CA    Reviewed

 

                    2011 12:00 AM    CT HEAD/BRAIN W/O & W/DYE    Reviewed

 

                    3/23/2015 12:00 AM    PNEUMOCOCCAL VACC 13 LGENDY IM    Reviewed

 

                    3/23/2015 12:00 AM    Vitamin B12 injection    Reviewed

 

                    2011 12:00 AM    ASSAY OF LITHIUM    Reviewed

 

                    2011 12:00 AM    B12 Injection Ndc#[address]-97  [place]    Reviewed

 

                    2015 12:00 AM    THER/PROPH/DIAG INJ SC/IM    Reviewed

 

                    2015 12:00 AM    B12 Injection, Up to 1000 Mcg NDC#1336-0395-70    Reviewed

 

                    2015 12:00 AM    COMPLETE CBC W/AUTO DIFF WBC    Reviewed

 

                    2015 12:00 AM    COMPREHEN METABOLIC PANEL    Reviewed

 

                    2015 12:00 AM    LIPID PANEL         Reviewed

 

                    2015 12:00 AM    ASSAY OF LITHIUM    Reviewed

 

                    2011 12:00 AM    VIT D 1 25-DIHYDROXY    Reviewed

 

                    2011 12:00 AM    VITAMIN B-12        Reviewed

 

                    2015 12:00 AM    B12 Injection, Up to 1000 Mcg NDC#5758-9217-20    Reviewed

 

                    2015 12:00 AM    THER/PROPH/DIAG INJ SC/IM    Reviewed

 

                    2015 12:00 AM    B12 Injection, Up to 1000 Mcg NDC#9653-0067-40    Reviewed

 

                    2011 12:00 AM    THER/PROPH/DIAG INJ SC/IM    Reviewed

 

                    2011 12:00 AM    B12 Injection ([employer] [facility]) Ndc#5317-3149-50    Reviewed

 

                    2015 12:00 AM    THER/PROPH/DIAG INJ SC/IM    Reviewed

 

                    2015 12:00 AM    B12 Injection, Up to 1000 Mcg NDC#9307-4003-66    Reviewed







Results Summary







                          Data and Description      Results

 

                          2004 12:00 AM        Colonoscopy-Women and Men over 50 Normal 

 

                          2008 12:00 AM         Pap Smear Declined 

 

                          10/7/2009 12:00 AM        Cholest Cry Stone Ql .0 %LDLc SerPl-mCnc 123.0 mg/dLHDLc

 SerPl-mCnc 34.0 mg/dLTrigl SerPl-mCnc 190.0 mg/dLGlucose SerPl-mCnc 78.0 mg/dL

 

                          2009 12:00 AM        Mammogram -Women over 40 Normal HIV1+2 Ab Ser Ql no risk 

 

                          2010 8:47 AM         Dexa Bone Scan Refused Aspirin reccommended Contraindication 



 

                          2010 8:48 AM         Depression Done 

 

                          2010 12:00 AM         Foot Exam-Diabetic Done 

 

                          2010 12:00 AM         Cholest Cry Stone Ql .0 %LDLc SerPl-mCnc 126.0 mg/dLGlucose

 SerPl-mCnc 102.0 mg/dL

 

                          2010 8:45 AM          TRIGLYCERIDES 122.0 mg/dLCHOLESTEROL 186.0 mg/dLHDL 36.0 mg/dLTOT

 CHOL/HDL 5.2 LDL (CALC) 126.0 mg/dLGLUCOSE 102.0 mg/dLSODIUM 143.0 
mmol/LPOTASSIUM 3.70 mmol/LCHLORIDE 111.0 mmol/LCO2 23.0 mmol/LBUN 10.0 
mg/dLCREATININE 0.80 mg/dLSGOT/AST 12.0 IU/LSGPT/ALT 11.0 IU/LALK PHOS 65.0 
IU/LTOTAL PROTEIN 7.20 g/dLALBUMIN 3.90 g/dLTOTAL BILI 0.50 mg/dLCALCIUM 10.20 
mg/dLAGE 59 GFR NonAA 73 GFR AA 88 eGFR >60 mL/min/1.73 m2eGFR AA* >60 WBC 5.7 
RBC 3.26 HGB 10.60 g/dLHCT 31.70 %MCV 97.0 fLMCH 32.50 pgMCHC 33.40 g/dLRDW SD 
47 RDW CV 13.30 %MPV 9.70 fLPLT 287 NRBC# 0.00 NRBC% 0.0 %NEUT 62.90 %%LYMP 
21.80 %%MONO 9.90 %%EOS 5.0 %%BASO 0.40 %#NEUT 3.56 #LYMP 1.23 #MONO 0.56 #EOS 
0.28 #BASO 0.02 MANUAL DIFF NOT IND 

 

                          2010 12:00 AM        Glucose SerPl-mCnc 96.0 mg/dLCholest Cry Stone Ql .0 %LDLc

 SerPl-mCnc 146.0 mg/dL

 

                          2010 8:26 AM         TRIGLYCERIDES 106.0 mg/dLCHOLESTEROL 199.0 mg/dLHDL 32.0 mg/dLTOT

 CHOL/HDL 6.2 LDL (CALC) 146.0 mg/dLGLUCOSE 96.0 mg/dLSODIUM 143.0 
mmol/LPOTASSIUM 4.0 mmol/LCHLORIDE 113.0 mmol/LCO2 24.0 mmol/LBUN 13.0 
mg/dLCREATININE 1.0 mg/dLSGOT/AST 11.0 IU/LSGPT/ALT 6.0 IU/LALK PHOS 56.0 
IU/LTOTAL PROTEIN 6.60 g/dLALBUMIN 3.80 g/dLTOTAL BILI 0.50 mg/dLCALCIUM 9.30 
mg/dLAGE 59 GFR NonAA 57 GFR AA 69 eGFR 57 eGFR AA* >60 

 

                          10/6/2010 12:00 AM        Cholest Cry Stone Ql .0 %LDLc SerPl-mCnc 111.0 mg/dLGlucose

 SerPl-mCnc 81.0 mg/dL

 

                          10/6/2010 2:45 PM         TRIGLYCERIDES 123.0 mg/dLCHOLESTEROL 178.0 mg/dLHDL 42.0 mg/dLTOT

 CHOL/HDL 4.2 LDL (CALC) 111.0 mg/dLGLUCOSE 81.0 mg/dLSODIUM 139.0 
mmol/LPOTASSIUM 4.10 mmol/LCHLORIDE 106.0 mmol/LCO2 24.0 mmol/LBUN 13.0 
mg/dLCREATININE 0.90 mg/dLSGOT/AST 13.0 IU/LSGPT/ALT 11.0 IU/LALK PHOS 61.0 
IU/LTOTAL PROTEIN 7.10 g/dLALBUMIN 3.90 g/dLTOTAL BILI 0.30 mg/dLCALCIUM 9.30 
mg/dLAGE 60 GFR NonAA 64 GFR AA 78 eGFR >60 mL/min/1.73 m2eGFR AA* >60 WBC 6.9 
RBC 3.59 HGB 11.50 g/dLHCT 35.30 %MCV 98.0 fLMCH 32.0 pgMCHC 32.60 g/dLRDW SD 46
 RDW CV 12.90 %MPV 9.90 fLPLT 311 NRBC# 0.00 NRBC% 0.0 %NEUT 64.90 %%LYMP 22.50 
%%MONO 7.20 %%EOS 5.10 %%BASO 0.30 %#NEUT 4.45 #LYMP 1.54 #MONO 0.49 #EOS 0.35 
#BASO 0.02 MANUAL DIFF NOT IND 

 

                          2011 12:00 AM         Mammogram -Women over 40 Ordered 

 

                          2011 10:25 AM        TRIGLYCERIDES 111.0 mg/dLCHOLESTEROL 195.0 mg/dLHDL 43.0 mg/dLTOT

 CHOL/HDL 4.5 LDL (CALC) 130.0 mg/dLWBC 5.3 RBC 3.76 HGB 12.0 g/dLHCT 37.80 %MCV
 101.0 fLMCH 31.90 pgMCHC 31.70 g/dLRDW SD 47 RDW CV 13.0 %MPV 9.70 fLPLT 259 
NRBC# 0.00 NRBC% 0.0 %NEUT 69.0 %%LYMP 17.60 %%MONO 8.30 %%EOS 4.70 %%BASO 0.40 
%#NEUT 3.63 #LYMP 0.93 #MONO 0.44 #EOS 0.25 #BASO 0.02 MANUAL DIFF NOT IND 
GLUCOSE 102.0 mg/dLSODIUM 146.0 mmol/LPOTASSIUM 4.20 mmol/LCHLORIDE 113.0 
mmol/LCO2 23.0 mmol/LBUN 15.0 mg/dLCREATININE 1.0 mg/dLSGOT/AST 12.0 
IU/LSGPT/ALT 17.0 IU/LALK PHOS 60.0 IU/LTOTAL PROTEIN 6.90 g/dLALBUMIN 4.20 
g/dLTOTAL BILI 0.40 mg/dLCALCIUM 9.70 mg/dLAGE 60 GFR NonAA 57 GFR AA 69 eGFR 57
 eGFR AA* >60 

 

                          2011 11:49 AM        Cholest Cry Stone Ql .0 %LDLc SerPl-mCnc 130.0 mg/dLHDLc

 SerPl-mCnc 43.0 mg/dLTrigl SerPl-mCnc 111.0 mg/dLGlucose SerPl-mCnc 102.0 mg/dL

 

                          2011 11:52 AM        Pap Smear Declined 

 

                          2011 11:28 AM        Lithium 2.080 mmol/LGLUCOSE 102.0 mg/dLSODIUM 135.0 mmol/LPOTASSIUM

 3.90 mmol/LCHLORIDE 106.0 mmol/LCO2 21.0 mmol/LBUN 12.0 mg/dLCREATININE 1.30 
mg/dLCALCIUM 10.70 mg/dLAGE 60 GFR NonAA 42 GFR AA 51 eGFR 42 eGFR AA* 51 

 

                          2011 8:58 AM          Lithium 0.690 mmol/L

 

                          2011 2:38 PM         VITAMIN B12 3483.0 pg/mL

 

                          2013 3:35 PM          WBC 5.1 RBC 3.73 HGB 11.70 g/dLHCT 36.40 %MCV 98.0 fLMCH 31.40

 pgMCHC 32.10 g/dLRDW SD 47 RDW CV 13.10 %MPV 9.80 fLPLT 224 NRBC# 0.00 NRBC% 
0.0 %NEUT 66.80 %%LYMP 19.10 %%MONO 9.0 %%EOS 4.90 %%BASO 0.20 %#NEUT 3.42 #LYMP
 0.98 #MONO 0.46 #EOS 0.25 #BASO 0.01 MANUAL DIFF NOT IND GLUCOSE 88.0 
mg/dLSODIUM 141.0 mmol/LPOTASSIUM 4.10 mmol/LCHLORIDE 110.0 mmol/LCO2 22.0 
mmol/LBUN 22.0 mg/dLCREATININE 1.10 mg/dLCALCIUM 9.80 mg/dLAGE 62 GFR NonAA 50 
GFR AA 61 eGFR 50 eGFR AA* 60 Lithium 0.760 mmol/L

 

                          2013 11:02 AM        TRIGLYCERIDES 106.0 mg/dLCHOLESTEROL 181.0 mg/dLHDL 46.0 mg/dLTOT

 CHOL/HDL 3.9 LDL (CALC) 114.0 mg/dLVITAMIN D 41.10 ng/mL

 

                          10/17/2014 10:10 AM       WBC 5.0 RBC 3.66 HGB 11.60 g/dLHCT 36.80 %.0 fLMCH 31.70

 pgMCHC 31.50 g/dLRDW SD 50 RDW CV 13.50 %MPV 10.10 fLPLT 209 NRBC# 0.00 NRBC% 
0.0 %NEUT 69.20 %%LYMP 21.0 %%MONO 6.40 %%EOS 3.20 %%BASO 0.20 %#NEUT 3.46 #LYMP
 1.05 #MONO 0.32 #EOS 0.16 #BASO 0.01 MANUAL DIFF NOT IND GLUCOSE 100.0 
mg/dLSODIUM 148.0 mmol/LPOTASSIUM 3.90 mmol/LCHLORIDE 114.0 mmol/LCO2 26.0 
mmol/LBUN 12.0 mg/dLCREATININE 1.20 mg/dLSGOT/AST 9.0 IU/LSGPT/ALT <6 IU/LALK 
PHOS 82.0 IU/LTOTAL PROTEIN 6.90 g/dLALBUMIN 4.0 g/dLTOTAL BILI 0.40 
mg/dLCALCIUM 10.50 mg/dLAGE 64 GFR NonAA 45 GFR AA 55 eGFR 45 eGFR AA* 55 
TRIGLYCERIDES 96.0 mg/dLCHOLESTEROL 155.0 mg/dLHDL 38.0 mg/dLTOT CHOL/HDL 4.1 
LDL (CALC) 98.0 mg/dLLithium 0.850 mmol/LCancer Antigen (CA) 125 8.30 U/mL

 

                          2015 10:25 AM        Lithium 0.790 mmol/LWBC 4.8 RBC 3.44 HGB 11.0 g/dLHCT 35.20 

%.0 fLMCH 32.0 pgMCHC 31.30 g/dLRDW SD 53 RDW CV 14.0 %MPV 9.30 fLPLT 210
 NRBC# 0.00 NRBC% 0.0 %NEUT 70.80 %%LYMP 17.20 %%MONO 8.10 %%EOS 3.50 %%BASO 
0.40 %#NEUT 3.41 #LYMP 0.83 #MONO 0.39 #EOS 0.17 #BASO 0.02 MANUAL DIFF NOT IND 
TRIGLYCERIDES 107.0 mg/dLCHOLESTEROL 174.0 mg/dLHDL 43.0 mg/dLTOT CHOL/HDL 4.0 
LDL (CALC) 110.0 mg/dLGLUCOSE 90.0 mg/dLSODIUM 145.0 mmol/LPOTASSIUM 3.80 
mmol/LCHLORIDE 115.0 mmol/LCO2 24.0 mmol/LBUN 17.0 mg/dLCREATININE 1.30 
mg/dLSGOT/AST 18.0 IU/LSGPT/ALT 17.0 IU/LALK PHOS 56.0 IU/LTOTAL PROTEIN 6.70 
g/dLALBUMIN 3.90 g/dLTOTAL BILI 0.40 mg/dLCALCIUM 9.80 mg/dLAGE 64 GFR NonAA 41 
GFR AA 50 eGFR 41 eGFR AA* 50 

 

                          2015 8:50 AM        WBC 5.8 RBC 3.29 HGB 10.70 g/dLHCT 34.0 %.0 fLMCH 32.50

 pgMCHC 31.50 g/dLRDW SD 52 RDW CV 13.60 %MPV 9.60 fLPLT 223 NRBC# 0.00 NRBC% 
0.0 %NEUT 69.60 %%LYMP 18.90 %%MONO 8.50 %%EOS 2.80 %%BASO 0.20 %#NEUT 4.03 
#LYMP 1.09 #MONO 0.49 #EOS 0.16 #BASO 0.01 MANUAL DIFF NOT IND Lithium 0.620 
mmol/LGLUCOSE 83.0 mg/dLSODIUM 139.0 mmol/LPOTASSIUM 3.90 mmol/LCHLORIDE 109.0 
mmol/LCO2 22.0 mmol/LBUN 19.0 mg/dLCREATININE 1.40 mg/dLSGOT/AST 19.0 
IU/LSGPT/ALT 21.0 IU/LALK PHOS 55.0 IU/LTOTAL PROTEIN 6.50 g/dLALBUMIN 3.90 
g/dLTOTAL BILI 0.50 mg/dLCALCIUM 9.60 mg/dLAGE 65 GFR NonAA 38 GFR AA 46 eGFR 38
 eGFR AA* 46 TRIGLYCERIDES 121.0 mg/dLCHOLESTEROL 192.0 mg/dLHDL 51.0 mg/dLTOT 
CHOL/HDL 3.8 .0 mg/dLFREE T4 0.79 TSH 1.210 uIU/mLHemoglobin A1c 5.40 
%Estim. Avg Glu (eAG) 108 

 

                          3/15/2016 8:08 AM         VITAMIN B12 696.0 pg/mL

 

                          3/23/2016 8:26 AM         WBC 7.0 RBC 3.61 HGB 11.80 g/dLHCT 37.70 %.0 fLMCH 32.70

 pgMCHC 31.30 g/dLRDW SD 49 RDW CV 12.50 %MPV 10.0 fLPLT 207 NRBC# 0.00 NRBC% 
0.0 %NEUT 73.60 %%LYMP 16.40 %%MONO 6.60 %%EOS 3.0 %%BASO 0.30 %#NEUT 5.15 #LYMP
 1.15 #MONO 0.46 #EOS 0.21 #BASO 0.02 MANUAL DIFF NOT IND Lithium 0.940 
mmol/LGLUCOSE 108.0 mg/dLSODIUM 143.0 mmol/LPOTASSIUM 4.30 mmol/LCHLORIDE 110.0 
mmol/LCO2 27.0 mmol/LBUN 16.0 mg/dLCREATININE 1.60 mg/dLSGOT/AST 13.0 
IU/LSGPT/ALT 7.0 IU/LALK PHOS 71.0 IU/LTOTAL PROTEIN 6.80 g/dLALBUMIN 4.0 
g/dLTOTAL BILI 0.20 mg/dLCALCIUM 10.40 mg/dLAGE 65 GFR NonAA 32 GFR AA 39 eGFR 
32 eGFR AA* 39 TRIGLYCERIDES 113.0 mg/dLCHOLESTEROL 169.0 mg/dLHDL 42.0 mg/dLTOT
 CHOL/HDL 4.0 LDL (CALC) 104.0 mg/dLFREE T4 0.86 TSH 2.20 uIU/mLHemoglobin A1c 
5.20 %Estim. Avg Glu (eAG) 103 

 

                          3/25/2016 9:17 AM         COLOR YELLOW APPEARANCE CLEAR SPEC GRAV 1.010 pH 7.0 PROTEIN 

NEGATIVE GLUCOSE NEGATIVE mg/dLKETONE NEGATIVE BILIRUBIN NEGATIVE BLOOD NEGATIVE
 NITRITE NEGATIVE [place] SCREEN SMALL MICRO IND? SEE BELOW WBC/HPF 0-5 RBC/HPF 
NEGATIVE CASTS/LPF NEGATIVE /LPFCRYSTALS NEGATIVE MUCOUS THRDS NEGATIVE BACTERIA
 NEGATIVE EPITH CELLS FEW SQUAMOUS /HPFTRICHOMONAS NEGATIVE YEAST NEGATIVE 

 

                          2016 6:00 AM        GLUCOSE 91.0 mg/dLSODIUM 143.0 mmol/LPOTASSIUM 3.60 mmol/LCHLORIDE

 112.0 mmol/LCO2 23.0 mmol/LBUN 22.0 mg/dLCREATININE 1.20 mg/dLSGOT/AST 15.0 
IU/LSGPT/ALT 12.0 IU/LALK PHOS 61.0 IU/LTOTAL PROTEIN 5.40 g/dLALBUMIN 3.10 
g/dLTOTAL BILI 0.40 mg/dLCALCIUM 8.40 mg/dLAGE 66 GFR NonAA 45 GFR AA 55 eGFR 45
 eGFR AA* 55 WBC 3.0 RBC 3.05 HGB 9.80 g/dLHCT 32.10 %.0 fLMCH 32.10 
pgMCHC 30.50 g/dLRDW SD 54 RDW CV 14.20 %MPV 10.10 fLPLT 170 NRBC# 0.00 NRBC% 
0.0 %NEUT 50.70 %%LYMP 32.60 %%MONO 10.50 %%EOS 5.90 %%BASO 0.30 %#NEUT 1.54 
#LYMP 0.99 #MONO 0.32 #EOS 0.18 #BASO 0.01 MANUAL DIFF NOT IND 

 

                          2016 2:09 PM        COLOR YELLOW APPEARANCE CLEAR SPEC GRAV 1.010 pH 5.0 PROTEIN

 30 GLUCOSE NEGATIVE mg/dLKETONE NEGATIVE BILIRUBIN NEGATIVE BLOOD LARGE NITRITE
 NEGATIVE [place] SCREEN MODERATE MICRO INDICATED? SEE BELOW WBC/HPF  RBC/HPF
 20-50 CASTS/LPF NEGATIVE /LPFCRYSTALS NEGATIVE MUCOUS THRDS NEGATIVE BACTERIA 
NEGATIVE EPITH CELLS FEW SQUAMOUS /HPFTRICHOMONAS NEGATIVE YEAST NEGATIVE CULT 
SET UP? YES 

 

                          1/3/2017 4:08 PM          COLOR YELLOW APPEARANCE HAZY SPEC GRAV 1.015 pH 6.0 PROTEIN 30

 GLUCOSE NEGATIVE mg/dLKETONE NEGATIVE BILIRUBIN NEGATIVE BLOOD MODERATE NITRITE
 NEGATIVE [place] SCREEN LARGE MICRO INDICATED? SEE BELOW WBC/-200 RBC/HPF 
5-10 CASTS/LPF NEGATIVE /LPFCRYSTALS NEGATIVE MUCOUS THRDS NEGATIVE BACTERIA 
NEGATIVE EPITH CELLS 1+ SQUAMOUS /HPFTRICHOMONAS NEGATIVE YEAST NEGATIVE CULT 
SET UP? YES 

 

                          2017 4:24 PM         CREATININE 1.50 mg/dLAGE 66 GFR NonAA 35 GFR AA 42 eGFR 35 eGFR

 AA* 42 VITAMIN B12 1324.0 pg/mL

 

                          2017 11:30 AM         GLUCOSE 93.0 mg/dLSODIUM 143.0 mmol/LPOTASSIUM 3.10 mmol/LCHLORIDE

 101.0 mmol/LCO2 29.0 mmol/LBUN 26.0 mg/dLCREATININE 1.50 mg/dLSGOT/AST 23.0 
IU/LSGPT/ALT 13.0 IU/LALK PHOS 66.0 IU/LTOTAL PROTEIN 7.70 g/dLALBUMIN 4.30 
g/dLTOTAL BILI 0.40 mg/dLCALCIUM 10.30 mg/dLAGE 66 GFR NonAA 35 GFR AA 42 eGFR 
35 eGFR AA* 42 TRIGLYCERIDES 147.0 mg/dLCHOLESTEROL 184.0 mg/dLHDL 44.0 mg/dLTOT
 CHOL/HDL 4.2 .0 mg/dLWBC 5.4 RBC 3.98 HGB 12.90 g/dLHCT 40.20 %.0
 fLMCH 32.40 pgMCHC 32.10 g/dLRDW SD 50 RDW CV 13.50 %MPV 9.30 fLPLT 210 NRBC# 
0.00 NRBC% 0.0 %NEUT 54.20 %%LYMP 30.70 %%MONO 9.10 %%EOS 5.20 %%BASO 0.40 
%#NEUT 2.94 #LYMP 1.66 #MONO 0.49 #EOS 0.28 #BASO 0.02 MANUAL DIFF NOT IND FREE 
T4 1.09 COLOR YELLOW APPEARANCE CLEAR SPEC GRAV <=1.005 pH 5.5 PROTEIN NEGATIVE 
GLUCOSE NEGATIVE mg/dLKETONE NEGATIVE BILIRUBIN NEGATIVE BLOOD NEGATIVE NITRITE 
NEGATIVE [place]UK SCREEN LARGE MICRO INDICATED? SEE BELOW WBC/HPF 10-20 RBC/HPF 0-5 
CASTS/LPF NEGATIVE /LPFCRYSTALS NEGATIVE MUCOUS THRDS NEGATIVE BACTERIA FEW 
EPITH CELLS 1+ SQUAMOUS /HPFTRICHOMONAS NEGATIVE YEAST NEGATIVE CULT SET UP? YES
 TSH 1.820 uIU/mL







History Of Immunizations







       Name    Date Admin    Mfg Name    Mfg Code    Trade Name    Lot#    Route    Inj    Vis Given    Vis

 Pub                                    CVX

 

        Influenza    2008    Not Entered    NE      Not Entered            Not Entered    Not Entered

                    1            999

 

        X       12/19/2008    [employer]    MSD     Pneumovax 23            Intramuscular    Not Entered

                    1            999

 

           Influenza    10/15/2010    "[employer] Arely.    NOV        Fluvirin > 12 Years    

[id number]     Intramuscular    Left Deltoid    10/15/2010    2009    999

 

          X         3/23/2015    [facility]    [facility]       Prevnar 13    [id number]    Intramuscular

                Right Gluteous Medius    3/23/2015       2013       109







History of Past Illness







                    Name                Date of Onset       Comments

 

                    Peritoneal Neoplasm, Malignant                         

 

                    Hyperlipidemia                           

 

                    Bipolar disorder, unspecified                         

 

                    Artificial opening status; colostomy                         

 

                    B12 deficiency                           

 

                    Anemia, Pernicious                         

 

                    Arthritis unspecified                         

 

                    cervical cancer                          

 

                    Artificial opening status; colostomy    2010  1:10PM     

 

                    Bipolar disorder, unspecified    2010  1:10PM     

 

                    Hyperlipidemia      2010  1:10PM     

 

                    Anemia, Pernicious    2010  1:10PM     

 

                    Postoperative Follow-Up    2010  1:55PM     

 

                    Postoperative Follow-Up    Mar  8 2010 10:57AM     

 

                    Artificial opening status; colostomy    Mar  8 2010  1:19PM     

 

                    Peritoneal Neoplasm, Malignant    Mar  8 2010  1:19PM     

 

                    Artificial opening status; colostomy    2010  1:40PM     

 

                    Hyperlipidemia      2010  1:40PM     

 

                    Anemia, Pernicious    2010  1:40PM     

 

                    Peritoneal Neoplasm, Malignant    2010  1:40PM     

 

                    Arthritis unspecified    2010  1:40PM     

 

                    Anemia of Chronic Illness    2010  1:40PM     

 

                    Tinea corporis      2010  3:17PM     

 

                    Bipolar disorder, unspecified    2010  1:33PM     

 

                    Hyperlipidemia      2010  1:33PM     

 

                    Anemia, Pernicious    2010  1:33PM     

 

                    Peritoneal Neoplasm, Malignant    2010  1:33PM     

 

                    B12 deficiency      2010  1:33PM     

 

                    Ethmoidal Sinusitis, Acute    Sep 21 2010  3:53PM     

 

                    Wheezing            Sep 21 2010  3:53PM     

 

                    Flu                 Oct 15 2010  1:40PM     

 

                    Bipolar disorder, unspecified    Oct 15 2010  1:42PM     

 

                    Hyperlipidemia      Oct 15 2010  1:42PM     

 

                    Anemia, Pernicious    Oct 15 2010  1:42PM     

 

                    Peritoneal Neoplasm, Malignant    Oct 15 2010  1:42PM     

 

                    Bipolar disorder, unspecified    2011 12:01PM     

 

                    Hyperlipidemia      2011 12:01PM     

 

                    Anemia, Pernicious    2011 12:01PM     

 

                    Peritoneal Neoplasm, Malignant    2011 12:01PM     

 

                    Bipolar disorder, unspecified    Apr 15 2011 10:55AM     

 

                    Major Depression    2011 10:11AM     

 

                    Bipolar Disorder    2011 10:11AM     

 

                    Cancer              May 10 2011  4:16PM     

 

                    Major Depression    May 10 2011  3:16PM     

 

                    Bipolar Disorder    May 10 2011  3:16PM     

 

                    Hypercalcemia       May 23 2011  2:47PM     

 

                    Bipolar disorder, unspecified    May 23 2011  2:47PM     

 

                    Colon Cancer, Personal History    May 23 2011  2:47PM     

 

                    Bipolar Disorder    May 31 2011  4:39PM     

 

                    Depressive Disorder    2011 10:01AM     

 

                    Vitamin B12 deficiency    2011 10:01AM     

 

                    Vitamin D Deficiency    2011  5:07PM     

 

                    Anemia, Vitamin B12 Deficiency    2011  5:07PM     

 

                    B12 deficiency      2011  3:56PM     

 

                    Routine gynecological examination    Aug  4 2011  9:08AM     

 

                    Screening Examination for Breast Cancer    Aug  4 2011  9:08AM     

 

                    Tinea Corporis      Aug  4 2011  9:08AM     

 

                    Depressive Disorder    Sep 23 2011  8:47AM     

 

                    Contact Dermatitis    Sep 23 2011  8:47AM     

 

                    Anemia, Pernicious    Sep 23 2011  8:47AM     

 

                    B12 deficiency      Sep 23 2011  8:47AM     

 

                    B12 deficiency      Sep 27 2011  2:58PM     

 

                    B12 deficiency      Oct 20 2011  2:34PM     

 

                    Flu                 Dec  9 2011  3:16PM     

 

                    B12 deficiency      Dec  9 2011  3:17PM     

 

                    B12 deficiency      2012  4:52PM     

 

                    B12 deficiency      Feb 2012 11:10AM     

 

                    B12 deficiency      2012  3:37PM     

 

                    B12 deficiency      May  3 2012  4:10PM     

 

                    B12 deficiency      2012  2:54PM     

 

                    B12 deficiency      2012 11:23AM     

 

                    B12 deficiency      Aug  9 2012  2:08PM     

 

                    B12 deficiency      Sep  6 2012  4:36PM     

 

                    B12 deficiency      Oct 16 2012 10:23AM     

 

                    Flu                 Feb  2013  3:11PM     

 

                    Bipolar disorder, unspecified    Feb  2013  2:48PM     

 

                    Anemia, Pernicious    Feb  2013  2:48PM     

 

                    B12 deficiency      Feb  4   2:48PM     

 

                    Extrapyramidal abnormal movement disorder    Feb  4   2:48PM     

 

                    B12 deficiency      Apr  3 2013 12:03PM     

 

                    Bipolar disorder, unspecified    May  7 2013  1:31PM     

 

                    Anemia, Pernicious    May  7 2013  1:31PM     

 

                    B12 deficiency      May  7 2013  1:31PM     

 

                    Extrapyramidal abnormal movement disorder    May  7 2013  1:31PM     

 

                    B12 deficiency      2013  3:42PM     

 

                    B12 deficiency      2013  1:31PM     

 

                    Hyperlipidemia      Aug  7 2013 10:37AM     

 

                    Vitamin D Deficiency    Aug  7 2013 10:37AM     

 

                    Bipolar disorder, unspecified    Aug  7 2013 10:37AM     

 

                    Anemia, Pernicious    Aug  7 2013 10:37AM     

 

                    B12 deficiency      Aug  7 2013 10:37AM     

 

                    B12 deficiency      Sep 25 2013 11:15AM     

 

                    B12 deficiency      Dec 11 2013  3:16PM     

 

                    B12 deficiency      Mar  6 2014  1:48PM     

 

                    B12 deficiency      May 21 2014  3:17PM     

 

                    Screening Examination for Breast Cancer    2014  3:23PM     

 

                    Periumbilical abdominal pain    2014  3:23PM     

 

                    B12 deficiency      Jul 10 2014  2:52PM     

 

                    Anemia, Vitamin B12 Deficiency    Aug 13 2014  4:50PM     

 

                    Bipolar disorder    Oct 16 2014 11:13AM     

 

                    Hyperlipidemia      Oct 16 2014 11:13AM     

 

                    Anemia, Pernicious    Oct 16 2014 11:13AM     

 

                    Peritoneal Neoplasm, Malignant    Oct 16 2014 11:13AM     

 

                    Screening breast examination    Oct 16 2014 11:13AM     

 

                    Weight loss         Oct 16 2014 11:13AM     

 

                    Anemia, Pernicious    Mar 23 2015  2:57PM     

 

                    B12 deficiency      Mar 23 2015  2:57PM     

 

                    Need for Prevnar vaccine    Mar 23 2015  2:57PM     

 

                    Bipolar disorder    Mar 23 2015  2:57PM     

 

                    Hyperlipidemia      Mar 23 2015  2:57PM     

 

                    Anemia, Pernicious    Mar 23 2015  2:57PM     

 

                    Peritoneal Neoplasm, Malignant    Mar 23 2015  2:57PM     

 

                    B12 deficiency      May  4 2015  4:48PM     

 

                    Hyperlipidemia      May 13 2015  9:56AM     

 

                    Anemia              May 13 2015  9:56AM     

 

                    Bipolar disorder    May 13 2015  9:56AM     

 

                    Bipolar disorder    May 14 2015  3:27PM     

 

                    Hyperlipidemia      May 14 2015  3:27PM     

 

                    Anemia, Pernicious    May 14 2015  3:27PM     

 

                    Peritoneal Neoplasm, Malignant    May 14 2015  3:27PM     

 

                    B12 deficiency      2015  2:20PM     

 

                    B12 deficiency      2015 11:34AM     

 

                    B12 deficiency      Aug 18 2015  9:06AM     

 

                    Tinea Corporis      Sep 18 2015  8:54AM     

 

                    B12 deficiency      Sep 18 2015  8:54AM     

 

                    B12 deficiency      2015 10:28AM     

 

                    Herpes zoster without complication    Dec  3 2015  9:52AM     

 

                    B12 deficiency      Dec 23 2015 11:21AM     

 

                    B12 deficiency      2016  4:51PM     

 

                    Vitamin B 12 deficiency    Mar 14 2016  5:35PM     

 

                    B12 deficiency      Mar 15 2016 12:14PM     

 

                    B12 deficiency      May  5 2016 11:30AM     

 

                    Edema               May  5 2016 11:30AM     

 

                    Dermatitis          May  5 2016 11:30AM     

 

                    Edema               May 17 2016  8:38AM     

 

                    Shortness of breath    May 17 2016  8:38AM     

 

                    Bilateral edema of lower extremity    2016  2:06PM     

 

                    B12 deficiency      2016  2:06PM     

 

                    B12 deficiency      2016 11:50AM     

 

                    B12 deficiency      2016 11:20AM     

 

                    Diarrhea            Aug  2 2016  3:13PM     

 

                    B12 deficiency      Aug 24 2016 11:10AM     

 

                    Encounter for screening mammogram for breast cancer    Aug 24 2016 11:44AM     

 

                    B12 deficiency      Sep 28 2016  2:35PM     

 

                    B12 deficiency      Dec 15 2016  2:02PM     

 

                    Dysuria             Dec 29 2016 12:14PM     

 

                    Hematuria           Fidencio  3 2017  1:33PM     

 

                    Constipation by delayed colonic transit    2017  1:52PM     

 

                    Ileus               2017  1:52PM     

 

                    UTI (urinary tract infection)    Fidencio 15 2017  3:39PM     

 

                    Acute cystitis with hematuria    2017 11:07AM     

 

                    B12 deficiency      2017 11:07AM     

 

                    B12 deficiency      2017 11:40AM     

 

                    B12 deficiency      2017  4:07PM     

 

                    Slurred speech      2017  3:07PM     

 

                    Vitamin B12 deficiency    2017  3:07PM     

 

                    Dysphagia, unspecified type    2017  3:07PM     

 

                    Hyperlipidemia      2017  3:07PM     

 

                    Dysuria             2017 12:01PM     







Payers







           Insurance Name    Company Name    Plan Name    Plan Number    Policy Number    Policy Group

 Number                                 Start Date

 

                    Medicare RHC Medicare RHC              [id number]              N/A

 

                          [employer]    [employer]                 [id number]

                                                    

 

                    Medicare Part A    Medicare - Lab/Xray              [id number]              2006

 

                    Medicare Part B    Medic[employer] [place]              [id number]              2006

 

                          [employer]    [employer]                 50 percent

                                                    2009

 

                    Human    [employer]              [id number]              N/A

 

                    Medicare Part A    Medicare Part A              [id number]              N/A

 

                    Medicare Part A    Medicare Part A              [id number]              2006









History of Encounters







                    Visit Date          Visit Type          Provider

 

                    2017           Office visit         

 

                    2017           Office visit        Bhupinder Louise DO

 

                    2017           Nurse visit         Bhupinder Louise DO

 

                    2017           Office visit        Radha Ontiveros APRN

 

                    1/15/2017           Office visit        Aj Tapia NP

 

                    2017            Office visit        Devin Masterson MD

 

                    2016          [facility]            Devin Masterson MD

 

                    12/15/2016          Nurse visit         Bhupinder Louise DO

 

                    2016           Nurse visit         Bret Forte APRN

 

                    2016           Nurse visit         Bhupinder Louise DO

 

                    2016            Office visit        Bhupinder Louise DO

 

                    2016           Nurse visit         Bhupinder Aspen DO

 

                    2016           Office visit        Bret Forte APRN

 

                    2016            Office visit        Bhupinder Aspen DO

 

                    3/15/2016           Nurse visit         Bhupinder Aspen DO

 

                    2016            Nurse visit         Bhupinder Aspen DO

 

                    2015          Nurse visit         Bhupinder Aspen DO

 

                    12/3/2015           Office visit        Bhupinder Aspen DO

 

                    2015          Nurse visit         Bhupinder Aspen DO

 

                    2015           Office visit        Bhupinder Aspen DO

 

                    2015           Nurse visit         Bhupinder Aspen DO

 

                    2015            Nurse visit         Bhupinder Aspen DO

 

                    2015            Nurse visit         Bhupinder Aspen DO

 

                    2015           Office visit        Bhupinder Aspen DO

 

                    2015            Nurse visit         Bhupinder Aspen DO

 

                    3/23/2015           Office visit        Bhupinder Aspen DO

 

                    10/16/2014          Office visit        Bhupinder Aspen DO

 

                    2014           Nurse visit         Radha Ontiveros APRN

 

                    7/10/2014           Nurse visit         Bhupinder Aspen DO

 

                    2014           Office visit        Bhupinder Aspen DO

 

                    2014           Nurse visit         Bhupinder Aspen DO

 

                    3/6/2014            Nurse visit         Bhupinder Aspen DO

 

                    2014            Yasmine Lopez MD

 

                    2013          Nurse visit         Bhupinder Aspen DO

 

                    2013           Nurse visit         Bhupinder Aspen DO

 

                    2013            Office visit        Bhupinder Aspen DO

 

                    2013            Nurse visit         Bhupinder Aspen DO

 

                    2013            Nurse visit         Bhupinder Aspen DO

 

                    2013            Office visit        Bhupinder Aspen DO

 

                    4/3/2013            Nurse visit         Bhupinder Aspen DO

 

                    2013            Office visit        Bhupinder Aspen DO

 

                    10/16/2012          Nurse visit         Bhupinder Aspen DO

 

                    2012            Nurse visit         Bhupinder Aspen DO

 

                    2012            Voided              Bhupinder Aspen DO

 

                    2012            Nurse visit         Bhupinder Aspen DO

 

                    2012            Nurse visit         Bhupinder Aspen DO

 

                    2012           Nurse visit         Bhupinder Aspen DO

 

                    5/3/2012            Nurse visit         Bhupinder Aspen DO

 

                    2012           Nurse visit         Bhupinder Aspen DO

 

                    2012           Nurse visit         Bhupinder Aspen DO

 

                    2012           Nurse visit         Bhupinder Aspen DO

 

                    2011           Nurse visit         Bhupinder Aspen DO

 

                    10/20/2011          Nurse visit         Bhupinder Aspen DO

 

                    2011           Office visit        Bhupinder Aspen DO

 

                    2011           Nurse visit         Radha Ontiveros NORMA

 

                    2011            Office visit        Bhupinder Aspen DO

 

                    2011           Nurse visit         Bhupinder Aspen DO

 

                    2011            Office visit        Bhupinder Aspen DO

 

                    2011           Office visit        Bhupinder Aspen DO

 

                    5/10/2011           Office visit        Bhupinder Aspen DO

 

                    2011           Office visit        Bhupinder Louise DO

 

                    4/15/2011           Office visit        Devin Angel DO

 

                    2011           Office visit        Devin Angel DO

 

                    10/15/2010          Office visit        Devin Angel DO

 

                    2010           Office visit        Devin Angel DO

 

                    2010            Office visit        Devin Angel DO

 

                    2010           Office visit        Devin Angel DO

 

                    2010            Office visit        Devin Angel DO

 

                    3/8/2010            Office visit        Devin Masterson MD

 

                    2010            Surgery             Devin Masterson MD

 

                    2010            Office visit        Devin Angel DO

 

                    2010           Surgery             Devin Masterson MD

 

                    2010           Hospital            Devin Masterson MD

 

                    2010           Hospital            Devin Masterson MD

 

                    10/22/2009          Office visit        Devin Angel DO

## 2019-06-26 NOTE — XMS REPORT
MU2 Ambulatory Summary

                             Created on: 2018



Pauline Gan

External Reference #: [id number]

: 1950

Sex: Female



Demographics







                          Address                   [address] [place]  [address]

 

                          Home Phone                [phone]

 

                          Preferred Language        English

 

                          Marital Status            

 

                          Orthodoxy Affiliation     Unknown

 

                          Race                      White

 

                          Ethnic Group              Not  or 





Author







                          Author                    Bhupinder Louise

 

                          [facility]

 

                          Address                   [address]

[place]  [id number]



 

                          Phone                     [phone]







Care Team Providers







                    Care Team Member Name    Role                Phone

 

                    Bhupinder Louise    PCP                 [phone]

 

                    Bhupinder Louise    PreferredProvider    [phone]







Allergies and Adverse Reactions







                    Name                Reaction            Notes

 

                    NO KNOWN DRUG ALLERGIES                         







Plan of Treatment







             Planned Activity    Comments     Planned Date    Planned Time    Plan/Goal

 

             Injection,Subcutaneous/Intramuscul, [facility]                 2017    12:00 AM      







Medications







                                        Active 

 

             Name         Start Date    Estimated Completion Date    SIG          Comments

 

             pravastatin 40 mg oral tablet    3/30/2015                 TAKE 1 TABLET BY MOUTH DAILY     

 

                Namenda XR 28 mg oral capsule,sprinkle,ER 24hr    2016                       take 1 capsule (28

 mg) by oral route once daily            

 

                potassium chloride 10 mEq oral tablet extended release    2016                       take 1 tablet

 (10 meq) by oral route once daily       

 

             pravastatin 40 mg oral tablet    2016                 TAKE 1 TABLET BY MOUTH DAILY     

 

                Vitamin B-12 1,000 mcg/mL injection solution    2016                       inject 1 milliliter 

(1,000 mcg) by intramuscular route once a month     

 

                potassium chloride 10 mEq oral tablet extended release    2016                      take 1 tablet

 (10 meq) by oral route once daily       

 

                Namenda XR 28 mg oral capsule,sprinkle,ER 24hr    2016                      TAKE 1 CAPSULE BY

 MOUTH EVERY DAY                         

 

                potassium chloride 10 mEq oral tablet extended release    2017                       TAKE 2 TABLETs

 BY MOUTH twice DAILY for one week       

 

                aspirin 81 mg oral tablet,delayed release (DR/EC)    2017                       TAKE 1 TABLET BY

 MOUTH EVERY DAY                         

 

             MULTI-VITAMINS    2017                 TAKE 1 TABLET BY MOUTH EVERY DAY     

 

                carbamazepine 200 mg oral tablet    2018                       TAKE 1 TABLET BY MOUTH BEFORE BREAKFAST

 AND TAKE 2 TABLETS AT BEDTIME for 30 days     

 

                rivastigmine tartrate 1.5 mg oral capsule    2018                       TAKE 1 CAPSULE BY MOUTH

 TWICE DAILY BEFORE MEALS for 30 days     

 

                simvastatin 20 mg oral tablet    2018                       TAKE 1 TABLET BY MOUTH AT BEDTIME for

 30 days                                 

 

             famotidine 20 mg oral tablet    2018                 TAKE 1 TABLET BY MOUTH TWICE DAILY    

 

 

                Namenda XR 28 mg oral capsule,sprinkle,ER 24hr                                    take 1 capsule (28 mg) by 

oral route once daily                    

 

                bisacodyl 5 mg oral tablet,delayed release (DR/EC)    2018                       take 3 tablets

 by oral route daily                    Mediaction adjusted 18

 

                memantine 28 mg oral capsule,sprinkle,ER 24hr    2018                       TAKE 1 CAPSULE BY MOUTH

 DAILY                                   

 

                rivastigmine tartrate 1.5 mg oral capsule    2018                        TAKE 1 CAPSULE BY MOUTH 

TWICE DAILY BEFORE MEALS for 30 days     

 

                carbamazepine 200 mg oral tablet    2018                        TAKE 1 TABLET BY MOUTH BEFORE BREAKFAST

 AND TAKE 2 TABLETS AT BEDTIME for 30 days     

 

                simvastatin 20 mg oral tablet    2018                       TAKE 1 TABLET BY MOUTH AT BEDTIME FOR

 30 DAYS                                 

 

             benztropine 1 mg oral tablet    9/10/2018                 TAKE 1 TABLET IN THE MORNING     

 

             quetiapine 25 mg oral tablet    9/10/2018                 TAKE 1 TABLET BY MOUTH AT BEDTIME     



 

                Remeron 15 mg oral tablet    2018                       take 1 tablet (15 mg) by oral route once

 daily before bedtime                    









                                         

 

             Name         Start Date    Expiration Date    SIG          Comments

 

             Reglan 10mg    3/29/2010    2010    one ac and hs     

 

                Keflex 500 mg oral capsule    2010       10/1/2010       take 1 capsule (500 mg) by oral

 route every 6 hours for 10 days         

 

                Bactrim -160 mg oral tablet    2011       take 1 tablet by oral route

 every 12 hours for 7 days               

 

                triamcinolone acetonide 0.1 % topical cream    2011      apply a thin

 layer to the affected area(s) by topical route 2 times per day     

 

                ergocalciferol (vitamin D2) 50,000 unit oral capsule    4/15/2013       2013       TAKE

 ONE CAPSULE BY MOUTH ONCE A WEEK        

 

                CYANOCOBALAM 1000MCGINJ 1000 milliliter    2013       INJECT 1ML INTRAMUSCULAR

 ONCE A MONTH                            

 

                pravastatin 40 mg oral tablet    3/25/2014       3/20/2015       TAKE ONE TABLET BY MOUTH EVERY

 DAY                                     

 

                          Zostavax (PF) 19,400 unit/0.65 mL subcutaneous suspension for reconstitution    3/23/2015

                    3/24/2015           inject 0.65 milliliter by subcutaneous route once     

 

                famciclovir 500 mg oral tablet    12/3/2015       12/10/2015      take 1 tablet (500 mg) by

 oral route every 8 hours for 7 days     

 

                furosemide 40 mg oral tablet    2016      take 1 tablet (40 mg) by oral

 route once daily                        

 

                Cipro 500 mg oral tablet    2016       take 1 tablet (500 mg) by oral route

 2 times per day for 5 days              

 

                Bactrim -160 mg oral tablet    2016        take 1 tablet by oral route

 every 12 hours for 7 days               

 

                Macrobid 100 mg oral capsule    2017       take 1 capsule (100 mg) by oral

 route 2 times per day with food for 7 days     

 

                Augmentin 875-125 mg oral tablet    2017       take 1 tablet by oral route

 every 12 hours for 7 days               

 

                amoxicillin 500 mg oral tablet    2017       take 1 tablet (500 mg) by oral

 route every 12 hours for 7 days         

 

                Namenda XR 28 mg oral capsule,sprinkle,ER 24hr    2017                       TAKE 1 CAPSULE BY 

MOUTH EVERY DAY                         Taking Generic

 

                ciprofloxacin HCl 500 mg oral tablet    2017       take 1 tablet (500 mg)

 by oral route every 12 hours for 5 days     

 

             pravastatin 40 mg oral tablet    2017                 TAKE 1 TABLET BY MOUTH DAILY    Taking

 Simvastatin

 

                cefuroxime axetil 500 mg oral tablet    2017        take 1 tablet (500 mg)

 by oral route 2 times per day for 10 days     

 

                famotidine 20 mg oral tablet    2017        TAKE 1 TABLET BY MOUTH TWICE

 DAILY                                   

 

                sertraline 100 mg oral tablet    2017       TAKE ONE AND ONE-HALF TABLETS

 BY MOUTH DAILY IN THE MORNING           

 

                clindamycin HCl 150 mg oral capsule    2018       take 1 capsule (150 mg)

 by oral route 2 times per day for 7 days     









                                        Discontinued 

 

             Name         Start Date    Discontinued Date    SIG          Comments

 

                Tylenol 325 mg oral tablet                    2013        take 1 - 2 tablets (325 -650 mg) by oral

 route every 4-6 hours as needed         

 

                Calcium 600 + D(3) 600 mg(1,500mg) -400 unit oral tablet                    2011       take 1 tablet

 by oral route 2 times a day            no longer taking

 

                Vitamin B-12 1,000 mcg oral tablet extended release    2010       take 1

 tablet by oral route daily             no longer taking

 

                Antifungal (clotrimazole) 1 % topical cream    2010       apply to the 

affected and surrounding areas of skin by topical route 2 times per day morning 
and evening                              

 

                sertraline 100 mg oral tablet    5/10/2011       2011       take 2 tablets (200 mg) by 

oral route once daily                   discontinued by Dr. Serrano

 

                mirtazapine 15 mg oral tablet                    2011        take 1 tablet (15 mg) by oral route 

once daily before bedtime               dc'd by Dr. Serrano

 

                Pristiq 50 mg oral tablet extended release 24 hr                    2013        take 1 tablet (50

 mg) by oral route once daily           dose updated

 

                Vitamin B-12 1,000 mcg/mL injection solution    2011        inject 1 milliliter

 (1,000 mcg) by intramuscular route once a month    on list already

 

                    syringe with needle 1 mL 25 gauge x 1" miscellaneous syringe    2011

                          use for injection once a month     

 

                clotrimazole 1 % topical cream    2011        apply to the affected and surrounding

 areas of skin by topical route 2 times per day in the morning and evening     

 

                Vitamin D2 50,000 unit oral capsule    2011        take 1 capsule (50,000

 unit) by oral route once weekly        generic on list

 

                Pravachol 40 mg oral tablet    2012        take 1 tablet (40 mg) by oral 

route once daily for 90 days            generic on list

 

                lithium carbonate 300 mg oral capsule    2012        take 1 capsule by oral

 route daily                            dose updated

 

                Pristiq 100 mg oral tablet extended release 24 hr                    4/10/2012       take 1 and 1/2 

tablet (150 mg) by oral route once daily    Discontinued by Dr Efrain Knight at [facility]

 

                hydroxyzine HCl 50 mg oral tablet    10/16/2014      2015       take 1 tablet (50 mg) 

by oral route at bedtime                 

 

                Latuda 20 mg oral tablet                    2018       take 1 tablet (20 mg) by oral route once

 daily with food (at least 350 calories)     

 

                lithium carbonate 300 mg oral capsule    2015       take 1 capsule (300

 mg) by oral route 2 for 30 days         

 

                fluconazole 100 mg oral tablet    2015       12/3/2015       take 1 tablet (100 mg) by 

oral route once a week                   

 

                ketoconazole 2 % topical cream    2015       12/3/2015       apply to the affected area(s)

 by topical route 2 times per day        

 

                prednisone 10 mg oral tablet    12/3/2015       2016        take 2 tablets (20 mg) by oral

 route once daily for 4 days 1 tablet daily for 4 days 0.5 tablet daily for 4 
days                                     

 

                triamcinolone acetonide 0.1 % topical cream    2016       apply a thin layer

 to the affected area(s) by topical route 2 times per day     

 

                furosemide 40 mg oral tablet    2016                      take 1 tablet (40 mg) by oral route

 once daily                              

 

                metoclopramide HCl 10 mg oral tablet    2017                        take 1 tablet by oral route 2

 times a day for 50 days                 

 

                Cipro 500 mg oral tablet    1/15/2017       2017       take 1 tablet (500 mg) by oral route

 every 12 hours for 10 days              

 

                mirtazapine 45 mg oral tablet                    2018       take 1 tablet (45 mg) by oral route

 once daily before bedtime               

 

                Fish Oil 300-1,000 mg oral capsule                    2018       take 1 capsule by oral route daily

                                         

 

                Vitamin D3 1,000 unit oral tablet                    2018       take 1 tablet by oral route daily

                                         

 

                Calcium 600 600 mg calcium (1,500 mg) oral tablet                    2018       take 1 tablet by

 oral route daily                        

 

                Aspirin Low Dose 81 mg oral tablet,delayed release (DR/EC)                    2018       take 1

 tablet (81 mg) by oral route once daily     

 

                metoclopramide HCl 10 mg oral tablet    2017                       take 1 tablet by oral route 

2 times a day for 50 days                

 

                furosemide 40 mg oral tablet    2017       TAKE 1 TABLET BY MOUTH DAILY

                                         

 

                Linzess 72 mcg oral capsule    2017       take 1 capsule (72 mcg) by oral

 route once daily on an empty stomach at least 30 minutes before 1st meal of the
day                                      

 

                metoclopramide HCl 10 mg oral tablet    2017       TAKE 1 TABLET BY ORAL

 ROUTE 2 TIMES A DAY FOR 50 DAYS         

 

                potassium chloride 10 mEq oral tablet extended release    2017       TAKE

 2 TABLETs BY MOUTH twice DAILY for one week     

 

                BISACODYL 5MG PV (BOX OF 25)    2017       TAKE 1 TABLET BY MOUTH EVERY

 DAY                                    dose updated

 

                memantine 10 mg oral tablet    2018       TAKE 1 TABLET BY MOUTH TWICE 

DAILY for 30 days                       dose updagted







Problem List







                    Description         Status              Onset

 

                    Artificial opening status; colostomy    Active               

 

                    Bipolar disorder, unspecified    Active               

 

                    Hyperlipidemia      Active               

 

                    Peritoneal Neoplasm, Malignant    Active               

 

                    Anemia, Pernicious    Active               

 

                    Arthritis unspecified    Active               

 

                    B12 deficiency      Active               







Vital Signs







      Date    Time    BP-Sys(mm[Hg]    BP-Lynn(mm[Hg])    HR(bpm)    RR(rpm)    Temp    WT    HT    HC    BMI

                    BSA                 BMI Percentile      O2 Sat(%)

 

        2018    9:32:00 AM    124 mmHg    80 mmHg    65 bpm    20 rpm    97 F    136.5 lbs    69 in

                          20.1573 kg/m    1.7362 m                 98 %

 

        2018    2:36:00 PM    134 mmHg    70 mmHg    68 bpm    22 rpm    97.9 F    142 lbs    69 in

                          20.97 kg/m2    1.77 m2                   98 %

 

        2017    1:34:00 PM    118 mmHg    62 mmHg    122 bpm    18 rpm    97.8 F    161.375 lbs    

69 in                     23.8307 kg/m    1.8877 m                 96 %

 

        2017    3:05:00 PM    134 mmHg    70 mmHg    70 bpm    20 rpm    97.4 F    181 lbs    69 in

                          26.73 kg/m2    2.00 m2                   98 %

 

        2017    11:07:00 AM    124 mmHg    64 mmHg    62 bpm    17 rpm    98.2 F    181.2 lbs    69

 in                       26.7583 kg/m    2.0003 m                 98 %

 

        1/15/2017    3:34:00 PM    148 mmHg    89 mmHg    69 bpm    20 rpm    98.2 F    179 lbs    69 in

                          26.43 kg/m2    1.99 m2                   98 %

 

       2017    1:51:00 PM    160 mmHg    90 mmHg    100 bpm    20 rpm    96.5 F    179 lbs             

                                                                98 %

 

       2016    3:11:00 PM    134 mmHg    76 mmHg    80 bpm    20 rpm    98 F    163 lbs    69 in     

                24.0706 kg/m    1.8972 m                      98 %

 

        2016    2:04:00 PM    142 mmHg    86 mmHg    68 bpm    16 rpm    98.5 F    166 lbs    63 in

                          29.41 kg/m2    1.83 m2                   100 %

 

        2016    11:27:00 AM    148 mmHg    78 mmHg    90 bpm    20 rpm    98.2 F    153 lbs    69 in

                          22.5939 kg/m    1.8381 m                 96 %

 

        12/3/2015    9:50:00 AM    132 mmHg    70 mmHg    62 bpm    16 rpm    97.9 F    145 lbs    69 in

                          21.41 kg/m2    1.79 m2                   100 %

 

        2015    8:52:00 AM    132 mmHg    68 mmHg    52 bpm    20 rpm    97.8 F    141 lbs    69 in

                          20.8218 kg/m    1.7645 m                 100 %

 

        2015    3:25:00 PM    120 mmHg    62 mmHg    72 bpm    16 rpm    98.1 F    136 lbs    69 in

                          20.08 kg/m2    1.73 m2                   98 %

 

       3/23/2015    2:55:00 PM    130 mmHg    76 mmHg    68 bpm    18 rpm    97 F    140 lbs    69 in    

                20.6742 kg/m    1.7583 m                      98 %

 

        10/16/2014    11:11:00 AM    120 mmHg    66 mmHg    77 bpm    20 rpm    98 F    130 lbs    69 in

                          19.20 kg/m2    1.69 m2                   100 %

 

        2014    3:21:00 PM    130 mmHg    66 mmHg    63 bpm    18 rpm    97.2 F    160 lbs    69 in

                          23.6276 kg/m    1.8797 m                 99 %

 

        2013    10:35:00 AM    132 mmHg    70 mmHg    66 bpm    20 rpm    98.1 F    157 lbs    69 in

                          23.18 kg/m2    1.86 m2                    

 

        2013    1:29:00 PM    132 mmHg    70 mmHg    76 bpm    18 rpm    98.2 F    166 lbs    69 in 

                          24.5137 kg/m    1.9146 m                  

 

       2013    2:46:00 PM    128 mmHg    70 mmHg    76 bpm    16 rpm    98 F    160 lbs    69 in     

                23.63 kg/m2     1.88 m2                          

 

        2011    8:49:00 AM    128 mmHg    78 mmHg    70 bpm    18 rpm    97.9 F    164 lbs    69 in

                          24.2183 kg/m    1.903 m                  

 

     2011    1:31:00 PM    132 mmHg    68 mmHg    84 bpm         97 F    167 lbs                        

                                         

 

        2011    9:09:00 AM    128 mmHg    70 mmHg    72 bpm    18 rpm    98.2 F    163 lbs    64 in 

                          27.9786 kg/m    1.8272 m                  

 

       2011    10:01:00 AM    132 mmHg    70 mmHg    72 bpm    18 rpm    98.2 F    154 lbs             

                                                                 

 

       2011    2:47:00 PM    128 mmHg    70 mmHg    72 bpm    18 rpm    97.8 F    156 lbs             

                                                                 

 

       5/10/2011    3:16:00 PM    144 mmHg    80 mmHg    72 bpm    18 rpm    98.2 F    158 lbs             

                                                                 

 

        2011    10:11:00 AM    132 mmHg    70 mmHg    70 bpm    18 rpm    98.2 F    168 lbs    69 in

                          24.809 kg/m    1.9261 m                  

 

        4/15/2011    10:52:00 AM    110 mmHg    60 mmHg    75 bpm    16 rpm    97.5 F    172.375 lbs    

69 in                     25.46 kg/m2    1.95 m2                   100 %

 

        2011    11:43:00 AM    120 mmHg    82 mmHg    75 bpm    16 rpm    97.2 F    178.5 lbs    69

 in                       26.3596 kg/m    1.9854 m                 100 %

 

        10/15/2010    1:32:00 PM    120 mmHg    70 mmHg    80 bpm    18 rpm    96.6 F    177 lbs    69 in

                          26.14 kg/m2    1.98 m2                   100 %

 

        2010    3:50:00 PM    168 mmHg    100 mmHg    82 bpm    18 rpm    97.8 F    177.5 lbs    69

 in                       26.2119 kg/m    1.9798 m                 97 %

 

        2010    1:21:00 PM    140 mmHg    80 mmHg    59 bpm    16 rpm    97.6 F    173.25 lbs    69 

in                        25.58 kg/m2    1.96 m2                   100 %

 

        2010    3:02:00 PM    140 mmHg    80 mmHg    61 bpm    16 rpm    97.6 F    173.125 lbs    69

 in                       25.5658 kg/m    1.9553 m                 99 %

 

        2010    1:23:00 PM    130 mmHg    80 mmHg    66 bpm    16 rpm    96.8 F    173 lbs    69 in 

                          25.55 kg/m2    1.95 m2                   100 %

 

        2010    12:58:00 PM    130 mmHg    88 mmHg    75 bpm    16 rpm    98.4 F    172.25 lbs    69

 in                       25.4366 kg/m    1.9503 m                 100 %







Social History







                    Name                Description         Comments

 

                    denies alcohol use                         

 

                    denies smoking                           

 

                    Denies illicit substance abuse                         

 

                    retired                                 direct care

 

                    Single                                   

 

                    Exercises regularly                         

 

                    Attended some college                         

 

                    Tobacco             Never smoker         







History of Procedures







                    Date Ordered        Description         Order Status

 

                    2010 12:00 AM    COMPREHEN METABOLIC PANEL    Reviewed

 

                    2010 12:00 AM    COMPLETE CBC W/AUTO DIFF WBC    Reviewed

 

                    2010 12:00 AM    LIPID PANEL         Reviewed

 

                          2015 12:00 AM        B12 Injection, Up to 1000 Mcg NDC#8189-2332-10 [facility] 

                                        Reviewed

 

                    2011 12:00 AM    MAMMOGRAM SCREENING    Reviewed

 

                    2011 12:00 AM    CYTOPATH C/V THIN LAYER    Reviewed

 

                    2011 12:00 AM    B12 Injection 1 cc NDC#[address]-20    Reviewed

 

                    2015 12:00 AM    THER/PROPH/DIAG INJ SC/IM    Reviewed

 

                    2015 12:00 AM    B12 Injection, Up to 1000 Mcg NDC#9748-2997-59    Reviewed

 

                    2011 12:00 AM    THER/PROPH/DIAG INJ SC/IM    Reviewed

 

                    2011 12:00 AM    B12 Injection([place]) Ndc#4308-6401-02-    Reviewed

 

                    2015 12:00 AM    THER/PROPH/DIAG INJ SC/IM    Reviewed

 

                    2015 12:00 AM    B12 Injection, Up to 1000 Mcg NDC#7145-4689-34    Reviewed

 

                    10/20/2011 12:00 AM    THER/PROPH/DIAG INJ SC/IM    Reviewed

 

                    10/20/2011 12:00 AM    B12 Injection([place]) Ndc#7317-7530-62-    Reviewed

 

                    2016 12:00 AM    THER/PROPH/DIAG INJ SC/IM    Reviewed

 

                    2016 12:00 AM    B12 Injection, Up to 1000 Mcg NDC#1832-4203-47    Reviewed

 

                    3/14/2016 12:00 AM    VITAMIN B-12        Reviewed

 

                    3/15/2016 12:00 AM    THER/PROPH/DIAG INJ SC/IM    Reviewed

 

                    3/15/2016 12:00 AM    B12 Injection, Up to 1000 Mcg NDC#6085-9405-28    Reviewed

 

                    2011 12:00 AM    ***Immunization administration, Medicare flu    Reviewed

 

                    2011 12:00 AM    Fluzone ** MEDICARE Only **    Reviewed

 

                    2011 12:00 AM    THER/PROPH/DIAG INJ SC/IM    Reviewed

 

                    2011 12:00 AM    B12 Injection ([employer]) Ndc#6577-5546-36    Reviewed

 

                    2016 12:00 AM    B12 Injection, Up to 1000 Mcg NDC#1714-4229-96 [facility] Medicare    

Reviewed

 

                    2016 12:00 AM    TTE W/DOPPLER COMPLETE    Reviewed

 

                    2016 12:00 AM    EXTREMITY STUDY     Reviewed

 

                          2016 12:00 AM        B12 Injection, Up to 1000 Mcg NDC#1128-9646-47 [facility] Medicare 

                                        Reviewed

 

                    2016 12:00 AM    THER/PROPH/DIAG INJ SC/IM    Reviewed

 

                    2016 12:00 AM    B12 Injection, Up to 1000 Mcg NDC#7970-9873-86    Reviewed

 

                    2016 12:00 AM    THER/PROPH/DIAG INJ SC/IM    Reviewed

 

                    2012 12:00 AM    B12 Injection([facility]) Ndc#2723-5453-25-    Reviewed

 

                    2016 12:00 AM    B12 Injection, Up to 1000 Mcg NDC#6630-9217-33    Reviewed

 

                    2016 12:00 AM    THER/PROPH/DIAG INJ SC/IM    Reviewed

 

                    2012 12:00 AM    THER/PROPH/DIAG INJ SC/IM    Reviewed

 

                    2012 12:00 AM    B12 Injection ([employer]) Ndc#3007-0344-80    Reviewed

 

                    2016 12:00 AM    THER/PROPH/DIAG INJ SC/IM    Reviewed

 

                    2016 12:00 AM    B12 Injection, Up to 1000 Mcg NDC#0103-0491-96    Reviewed

 

                    2016 12:00 AM    B12 Injection, Up to 1000 Mcg NDC#4909-5518-21    Reviewed

 

                    2016 12:00 AM    THER/PROPH/DIAG INJ SC/IM    Reviewed

 

                    2012 12:00 AM    THER/PROPH/DIAG INJ SC/IM    Reviewed

 

                    2012 12:00 AM    B12 Injection([facility]) Ndc#0434-7253-40-    Reviewed

 

                    12/15/2016 12:00 AM    B12 Injection, Up to 1000 Mcg NDC#5438-6434-32    Reviewed

 

                    12/15/2016 12:00 AM    THER/PROPH/DIAG INJ SC/IM    Reviewed

 

                    2016 12:00 AM    URNLS DIP STICK/TABLET RGNT AUTO W/O MICROSCOPY    Reviewed

 

                    1/3/2017 12:00 AM    URNLS DIP STICK/TABLET RGNT AUTO W/O MICROSCOPY    Reviewed

 

                    2017 12:00 AM    URINE CULTURE/COLONY COUNT    Reviewed

 

                    2017 12:00 AM    Rocephin 1 gram Injection, [facility] Medicare    Reviewed

 

                    2017 12:00 AM    THERAPEUTIC PROPHYLACTIC/DX INJECTION SUBQ/IM    Reviewed

 

                    2017 12:00 AM    B12 1000mcg Injection, [facility] Medicare    Reviewed

 

                    5/3/2012 12:00 AM    THER/PROPH/DIAG INJ SC/IM    Reviewed

 

                    5/3/2012 12:00 AM    B12 Injection([facility]) Ndc#6504-8860-25-    Reviewed

 

                    2017 12:00 AM    THERAPEUTIC PROPHYLACTIC/DX INJECTION SUBQ/IM    Reviewed

 

                    2017 12:00 AM    B12 1000mcg Injection, [facility] Medicare    Reviewed

 

                    2017 12:00 AM    MRI BRAIN STEM W/O & W/DYE    Reviewed

 

                    2017 12:00 AM    VITAMIN B-12        Reviewed

 

                    2017 12:00 AM    Speech Therapy Consult    Reviewed

 

                    2017 12:00 AM    ASSAY OF CREATININE    Reviewed

 

                    2012 12:00 AM    IMMUNOTHERAPY INJECTIONS    Reviewed

 

                    2012 12:00 AM    B12 Injection([facility]) Ndc#6359-0999-90-    Reviewed

 

                    2017 12:00 AM    URINALYSIS AUTO W/SCOPE    Reviewed

 

                    2012 12:00 AM    THER/PROPH/DIAG INJ SC/IM    Reviewed

 

                    2012 12:00 AM    B12 Injection, Up to 1000 Mcg NDC#6315-1295-42    Reviewed

 

                    2017 12:00 AM    URINALYSIS AUTO W/SCOPE    Reviewed

 

                    2017 2:18 PM    URINALYSIS AUTO W/O SCOPE    Reviewed

 

                    2017 12:00 AM    URINE CULTURE/COLONY COUNT    Reviewed

 

                    2017 12:00 AM    B12 1000mcg Injection    Reviewed

 

                    2017 12:00 AM    URNLS DIP STICK/TABLET RGNT AUTO W/O MICROSCOPY    Reviewed

 

                    2017 12:00 AM    METABOLIC PANEL TOTAL CA    Reviewed

 

                    2017 12:00 AM    URNLS DIP STICK/TABLET RGNT AUTO W/O MICROSCOPY    Reviewed

 

                    2012 12:00 AM    THER/PROPH/DIAG INJ SC/IM    Reviewed

 

                    2012 12:00 AM    B12 Injection, Up to 1000 Mcg NDC#8661-6011-44    Reviewed

 

                    2012 12:00 AM    THER/PROPH/DIAG INJ SC/IM    Reviewed

 

                    2012 12:00 AM    B12 Injection, Up to 1000 Mcg NDC#8483-2873-77    Reviewed

 

                    2017 12:00 AM    ASSAY CARBAMAZEPINE TOTAL    Reviewed

 

                    2017 12:00 AM    AUTOMATED DIFF WBC COUNT    Reviewed

 

                    2017 12:00 AM    COMPREHEN METABOLIC PANEL    Reviewed

 

                    2017 12:00 AM    MANUAL RETICULOCYTE COUNT    Reviewed

 

                    2017 12:00 AM    VITAMIN B-12        Reviewed

 

                    2017 12:00 AM    ASSAY OF FOLIC ACID SERUM    Reviewed

 

                    10/16/2012 12:00 AM    THER/PROPH/DIAG INJ SC/IM    Reviewed

 

                    10/16/2012 12:00 AM    B12 Injection, Up to 1000 Mcg NDC#7193-7330-99    Reviewed

 

                    2010 12:00 AM    COMPREHEN METABOLIC PANEL    Reviewed

 

                    2010 12:00 AM    COMPLETE CBC W/AUTO DIFF WBC    Reviewed

 

                    2010 12:00 AM    LIPID PANEL         Reviewed

 

                    2013 12:00 AM    Flu Injection 3 Years And Above NDC# [address]-42  [facility]    Reviewed



 

                    2013 12:00 AM    COMPLETE CBC W/AUTO DIFF WBC    Reviewed

 

                    2013 12:00 AM    ASSAY OF LITHIUM    Reviewed

 

                    2013 12:00 AM    METABOLIC PANEL TOTAL CA    Reviewed

 

                    4/3/2013 12:00 AM    THER/PROPH/DIAG INJ SC/IM    Reviewed

 

                    4/3/2013 12:00 AM    B12 Injection, Up to 1000 Mcg NDC#2608-4600-12    Reviewed

 

                    2013 12:00 AM    THER/PROPH/DIAG INJ SC/IM    Reviewed

 

                    2013 12:00 AM    B12 Injection, Up to 1000 Mcg NDC#2765-4039-86    Reviewed

 

                    2013 12:00 AM    THER/PROPH/DIAG INJ SC/IM    Reviewed

 

                    2013 12:00 AM    B12 Injection, Up to 1000 Mcg NDC#6579-9771-97    Reviewed

 

                    2013 12:00 AM    LIPID PANEL         Reviewed

 

                    2013 12:00 AM    VITAMIN D 25 HYDROXY    Reviewed

 

                    2013 12:00 AM    THER/PROPH/DIAG INJ SC/IM    Reviewed

 

                    2013 12:00 AM    B12 Injection, Up to 1000 Mcg NDC#2234-5666-56    Reviewed

 

                    2013 12:00 AM    THER/PROPH/DIAG INJ SC/IM    Reviewed

 

                    3/6/2014 12:00 AM    THER/PROPH/DIAG INJ SC/IM    Reviewed

 

                    2014 12:00 AM    THER/PROPH/DIAG INJ SC/IM    Reviewed

 

                    2014 12:00 AM    B12 Injection, Up to 1000 Mcg NDC#0576-6356-57    Reviewed

 

                    2010 12:00 AM    SKIN FUNGI CULTURE    Reviewed

 

                    10/9/2010 12:00 AM    COMPREHEN METABOLIC PANEL    Reviewed

 

                    10/9/2010 12:00 AM    LIPID PANEL         Reviewed

 

                    2010 12:00 AM    THER/PROPH/DIAG INJ SC/IM    Reviewed

 

                    2010 12:00 AM    B12 Injection Ndc#[address]-01 ([facility])    Reviewed

 

                    2010 12:00 AM    THER/PROPH/DIAG INJ SC/IM    Reviewed

 

                    2010 12:00 AM    Kenalog 40 Mg Im-Ndc#[address]-31 ([place])    Reviewed

 

                    10/15/2010 12:00 AM    FLU VACCINE 3 YRS & > IM    Reviewed

 

                    10/15/2010 12:00 AM    Admin.Of M/C Cov.Vaccine-Flu Vacc.    Reviewed

 

                    1/15/2011 12:00 AM    COMPLETE CBC W/AUTO DIFF WBC    Reviewed

 

                    1/15/2011 12:00 AM    COMPREHEN METABOLIC PANEL    Reviewed

 

                    1/15/2011 12:00 AM    LIPID PANEL         Reviewed

 

                    2014 12:00 AM    MAMMOGRAM SCREENING    Reviewed

 

                    2014 12:00 AM    Screening mammography, bilateral    Reviewed

 

                    7/10/2014 12:00 AM    THER/PROPH/DIAG INJ SC/IM    Reviewed

 

                    7/10/2014 12:00 AM    B12 Injection, Up to 1000 Mcg NDC#3538-7909-27    Reviewed

 

                    2011 12:00 AM    COMPLETE CBC W/AUTO DIFF WBC    Reviewed

 

                    2011 12:00 AM    COMPREHEN METABOLIC PANEL    Reviewed

 

                    2011 12:00 AM    LIPID PANEL         Reviewed

 

                    2014 12:00 AM    B12 Injection, Up to 1000 Mcg NDC#7285-6233-24    Reviewed

 

                    10/19/2014 12:00 AM    MAMMOGRAM SCREENING    Reviewed

 

                    10/19/2014 12:00 AM    Screening mammography, bilateral    Reviewed

 

                    10/16/2014 12:00 AM    B12 Injection, Up to 1000 Mcg NDC#6504-3793-97    Reviewed

 

                    10/16/2014 12:00 AM    COMPLETE CBC W/AUTO DIFF WBC    Reviewed

 

                    10/16/2014 12:00 AM    COMPREHEN METABOLIC PANEL    Reviewed

 

                    10/16/2014 12:00 AM    IMMUNOASSAY TUMOR     Reviewed

 

                    10/16/2014 12:00 AM    LIPID PANEL         Reviewed

 

                    10/16/2014 12:00 AM    ASSAY OF LITHIUM    Reviewed

 

                    10/16/2014 12:00 AM    MAMMOGRAM SCREENING    Reviewed

 

                    2011 12:00 AM    ASSAY OF PARATHORMONE    Reviewed

 

                    2011 12:00 AM    VITAMIN D 25 HYDROXY    Reviewed

 

                    2011 12:00 AM    ASSAY OF LITHIUM    Reviewed

 

                    2011 12:00 AM    METABOLIC PANEL TOTAL CA    Reviewed

 

                    2011 12:00 AM    CT HEAD/BRAIN W/O & W/DYE    Reviewed

 

                    3/23/2015 12:00 AM    PNEUMOCOCCAL VACC 13 GLENDY IM    Reviewed

 

                    3/23/2015 12:00 AM    Vitamin B12 injection    Reviewed

 

                    2011 12:00 AM    ASSAY OF LITHIUM    Reviewed

 

                    2011 12:00 AM    B12 Injection Ndc#02394-2010-16  [place]    Reviewed

 

                    2015 12:00 AM    THER/PROPH/DIAG INJ SC/IM    Reviewed

 

                    2015 12:00 AM    B12 Injection, Up to 1000 Mcg NDC#3740-6582-70    Reviewed

 

                    2015 12:00 AM    COMPLETE CBC W/AUTO DIFF WBC    Reviewed

 

                    2015 12:00 AM    COMPREHEN METABOLIC PANEL    Reviewed

 

                    2015 12:00 AM    LIPID PANEL         Reviewed

 

                    2015 12:00 AM    ASSAY OF LITHIUM    Reviewed

 

                    2011 12:00 AM    VIT D 1 25-DIHYDROXY    Reviewed

 

                    2011 12:00 AM    VITAMIN B-12        Reviewed

 

                    2015 12:00 AM    B12 Injection, Up to 1000 Mcg NDC#1919-6150-05    Reviewed

 

                    2015 12:00 AM    THER/PROPH/DIAG INJ SC/IM    Reviewed

 

                    2015 12:00 AM    B12 Injection, Up to 1000 Mcg NDC#1718-7109-01    Reviewed

 

                    2011 12:00 AM    THER/PROPH/DIAG INJ SC/IM    Reviewed

 

                    2011 12:00 AM    B12 Injection ([employer]) Ndc#3208-1336-76    Reviewed

 

                    2015 12:00 AM    THER/PROPH/DIAG INJ SC/IM    Reviewed

 

                    2015 12:00 AM    B12 Injection, Up to 1000 Mcg NDC#5563-3391-44    Reviewed







Results Summary







                          Date and Description      Results

 

                          2004 12:00 AM        Colonoscopy-Women and Men over 50 Normal 

 

                          2008 12:00 AM         Pap Smear Declined 

 

                          10/7/2009 12:00 AM        Cholest Cry Stone Ql .0 %LDLc SerPl-mCnc 123.0 mg/dLHDLc

 SerPl-mCnc 34.0 mg/dLTrigl SerPl-mCnc 190.0 mg/dLGlucose SerPl-mCnc 78.0 mg/dL

 

                          2009 12:00 AM        Mammogram -Women over 40 Normal HIV1+2 Ab Ser Ql no risk 

 

                          2010 8:47 AM         Dexa Bone Scan Refused Aspirin reccommended Contraindication 



 

                          2010 8:48 AM         Depression Done 

 

                          2010 12:00 AM         Foot Exam-Diabetic Done 

 

                          2010 12:00 AM         Cholest Cry Stone Ql .0 %LDLc SerPl-mCnc 126.0 mg/dLGlucose

 SerPl-mCnc 102.0 mg/dL

 

                          2010 8:45 AM          TRIGLYCERIDES 122.0 mg/dLCHOLESTEROL 186.0 mg/dLHDL 36.0 mg/dLTOT

 CHOL/HDL 5.2 LDL (CALC) 126.0 mg/dLGLUCOSE 102.0 mg/dLSODIUM 143.0 
mmol/LPOTASSIUM 3.70 mmol/LCHLORIDE 111.0 mmol/LCO2 23.0 mmol/LBUN 10.0 
mg/dLCREATININE 0.80 mg/dLSGOT/AST 12.0 IU/LSGPT/ALT 11.0 IU/LALK PHOS 65.0 
IU/LTOTAL PROTEIN 7.20 g/dLALBUMIN 3.90 g/dLTOTAL BILI 0.50 mg/dLCALCIUM 10.20 
mg/dLAGE 59 GFR NonAA 73 GFR AA 88 eGFR >60 mL/min/1.73 m2eGFR AA* >60 WBC 5.7 
RBC 3.26 HGB 10.60 g/dLHCT 31.70 %MCV 97.0 fLMCH 32.50 pgMCHC 33.40 g/dLRDW SD 
47 RDW CV 13.30 %MPV 9.70 fLPLT 287 NRBC# 0.00 NRBC% 0.0 %NEUT 62.90 %%LYMP 
21.80 %%MONO 9.90 %%EOS 5.0 %%BASO 0.40 %#NEUT 3.56 #LYMP 1.23 #MONO 0.56 #EOS 
0.28 #BASO 0.02 MANUAL DIFF NOT IND 

 

                          2010 12:00 AM        Glucose SerPl-mCnc 96.0 mg/dLCholest Cry Stone Ql .0 %LDLc

 SerPl-mCnc 146.0 mg/dL

 

                          10/6/2010 12:00 AM        Cholest Cry Stone Ql .0 %LDLc SerPl-mCnc 111.0 mg/dLGlucose

 SerPl-mCnc 81.0 mg/dL

 

                          10/6/2010 2:45 PM         TRIGLYCERIDES 123.0 mg/dLCHOLESTEROL 178.0 mg/dLHDL 42.0 mg/dLTOT

 CHOL/HDL 4.2 LDL (CALC) 111.0 mg/dLGLUCOSE 81.0 mg/dLSODIUM 139.0 
mmol/LPOTASSIUM 4.10 mmol/LCHLORIDE 106.0 mmol/LCO2 24.0 mmol/LBUN 13.0 
mg/dLCREATININE 0.90 mg/dLSGOT/AST 13.0 IU/LSGPT/ALT 11.0 IU/LALK PHOS 61.0 
IU/LTOTAL PROTEIN 7.10 g/dLALBUMIN 3.90 g/dLTOTAL BILI 0.30 mg/dLCALCIUM 9.30 
mg/dLAGE 60 GFR NonAA 64 GFR AA 78 eGFR >60 mL/min/1.73 m2eGFR AA* >60 

 

                          2011 12:00 AM         Mammogram -Women over 40 Ordered 

 

                          2011 10:25 AM        TRIGLYCERIDES 111.0 mg/dLCHOLESTEROL 195.0 mg/dLHDL 43.0 mg/dLTOT

 CHOL/HDL 4.5 LDL (CALC) 130.0 mg/dLWBC 5.3 RBC 3.76 HGB 12.0 g/dLHCT 37.80 %MCV
 101.0 fLMCH 31.90 pgMCHC 31.70 g/dLRDW SD 47 RDW CV 13.0 %MPV 9.70 fLPLT 259 
NRBC# 0.00 NRBC% 0.0 %NEUT 69.0 %%LYMP 17.60 %%MONO 8.30 %%EOS 4.70 %%BASO 0.40 
%#NEUT 3.63 #LYMP 0.93 #MONO 0.44 #EOS 0.25 #BASO 0.02 MANUAL DIFF NOT IND 
GLUCOSE 102.0 mg/dLSODIUM 146.0 mmol/LPOTASSIUM 4.20 mmol/LCHLORIDE 113.0 
mmol/LCO2 23.0 mmol/LBUN 15.0 mg/dLCREATININE 1.0 mg/dLSGOT/AST 12.0 
IU/LSGPT/ALT 17.0 IU/LALK PHOS 60.0 IU/LTOTAL PROTEIN 6.90 g/dLALBUMIN 4.20 
g/dLTOTAL BILI 0.40 mg/dLCALCIUM 9.70 mg/dLAGE 60 GFR NonAA 57 GFR AA 69 eGFR 57
 eGFR AA* >60 

 

                          2011 11:49 AM        Cholest Cry Stone Ql .0 %LDLc SerPl-mCnc 130.0 mg/dLHDLc

 SerPl-mCnc 43.0 mg/dLTrigl SerPl-mCnc 111.0 mg/dLGlucose SerPl-mCnc 102.0 mg/dL

 

                          2011 11:52 AM        Pap Smear Declined 

 

                          2011 11:28 AM        Lithium 2.080 mmol/LGLUCOSE 102.0 mg/dLSODIUM 135.0 mmol/LPOTASSIUM

 3.90 mmol/LCHLORIDE 106.0 mmol/LCO2 21.0 mmol/LBUN 12.0 mg/dLCREATININE 1.30 
mg/dLCALCIUM 10.70 mg/dLAGE 60 GFR NonAA 42 GFR AA 51 eGFR 42 eGFR AA* 51 

 

                          2011 8:58 AM          Lithium 0.690 mmol/L

 

                          2011 2:38 PM         VITAMIN B12 3483.0 pg/mL

 

                          2013 3:35 PM          WBC 5.1 RBC 3.73 HGB 11.70 g/dLHCT 36.40 %MCV 98.0 fLMCH 31.40

 pgMCHC 32.10 g/dLRDW SD 47 RDW CV 13.10 %MPV 9.80 fLPLT 224 NRBC# 0.00 NRBC% 
0.0 %NEUT 66.80 %%LYMP 19.10 %%MONO 9.0 %%EOS 4.90 %%BASO 0.20 %#NEUT 3.42 #LYMP
 0.98 #MONO 0.46 #EOS 0.25 #BASO 0.01 MANUAL DIFF NOT IND GLUCOSE 88.0 
mg/dLSODIUM 141.0 mmol/LPOTASSIUM 4.10 mmol/LCHLORIDE 110.0 mmol/LCO2 22.0 
mmol/LBUN 22.0 mg/dLCREATININE 1.10 mg/dLCALCIUM 9.80 mg/dLAGE 62 GFR NonAA 50 
GFR AA 61 eGFR 50 eGFR AA* 60 Lithium 0.760 mmol/L

 

                          2013 11:02 AM        TRIGLYCERIDES 106.0 mg/dLCHOLESTEROL 181.0 mg/dLHDL 46.0 mg/dLTOT

 CHOL/HDL 3.9 LDL (CALC) 114.0 mg/dLVITAMIN D 41.10 ng/mL

 

                          10/17/2014 10:10 AM       WBC 5.0 RBC 3.66 HGB 11.60 g/dLHCT 36.80 %.0 fLMCH 31.70

 pgMCHC 31.50 g/dLRDW SD 50 RDW CV 13.50 %MPV 10.10 fLPLT 209 NRBC# 0.00 NRBC% 
0.0 %NEUT 69.20 %%LYMP 21.0 %%MONO 6.40 %%EOS 3.20 %%BASO 0.20 %#NEUT 3.46 #LYMP
 1.05 #MONO 0.32 #EOS 0.16 #BASO 0.01 MANUAL DIFF NOT IND GLUCOSE 100.0 
mg/dLSODIUM 148.0 mmol/LPOTASSIUM 3.90 mmol/LCHLORIDE 114.0 mmol/LCO2 26.0 
mmol/LBUN 12.0 mg/dLCREATININE 1.20 mg/dLSGOT/AST 9.0 IU/LSGPT/ALT <6 IU/LALK 
PHOS 82.0 IU/LTOTAL PROTEIN 6.90 g/dLALBUMIN 4.0 g/dLTOTAL BILI 0.40 
mg/dLCALCIUM 10.50 mg/dLAGE 64 GFR NonAA 45 GFR AA 55 eGFR 45 eGFR AA* 55 
TRIGLYCERIDES 96.0 mg/dLCHOLESTEROL 155.0 mg/dLHDL 38.0 mg/dLTOT CHOL/HDL 4.1 
LDL (CALC) 98.0 mg/dLLithium 0.850 mmol/LCancer Antigen (CA) 125 8.30 U/mL

 

                          2015 10:25 AM        Lithium 0.790 mmol/LWBC 4.8 RBC 3.44 HGB 11.0 g/dLHCT 35.20 

%.0 fLMCH 32.0 pgMCHC 31.30 g/dLRDW SD 53 RDW CV 14.0 %MPV 9.30 fLPLT 210
 NRBC# 0.00 NRBC% 0.0 %NEUT 70.80 %%LYMP 17.20 %%MONO 8.10 %%EOS 3.50 %%BASO 
0.40 %#NEUT 3.41 #LYMP 0.83 #MONO 0.39 #EOS 0.17 #BASO 0.02 MANUAL DIFF NOT IND 
TRIGLYCERIDES 107.0 mg/dLCHOLESTEROL 174.0 mg/dLHDL 43.0 mg/dLTOT CHOL/HDL 4.0 
LDL (CALC) 110.0 mg/dLGLUCOSE 90.0 mg/dLSODIUM 145.0 mmol/LPOTASSIUM 3.80 
mmol/LCHLORIDE 115.0 mmol/LCO2 24.0 mmol/LBUN 17.0 mg/dLCREATININE 1.30 
mg/dLSGOT/AST 18.0 IU/LSGPT/ALT 17.0 IU/LALK PHOS 56.0 IU/LTOTAL PROTEIN 6.70 
g/dLALBUMIN 3.90 g/dLTOTAL BILI 0.40 mg/dLCALCIUM 9.80 mg/dLAGE 64 GFR NonAA 41 
GFR AA 50 eGFR 41 eGFR AA* 50 

 

                          3/15/2016 8:08 AM         VITAMIN B12 696.0 pg/mL

 

                          2016 2:09 PM        COLOR YELLOW APPEARANCE CLEAR SPEC GRAV 1.010 pH 5.0 PROTEIN

 30 GLUCOSE NEGATIVE mg/dLKETONE NEGATIVE BILIRUBIN NEGATIVE BLOOD LARGE NITRITE
 NEGATIVE [place] SCREEN MODERATE MICRO INDICATED? SEE BELOW WBC/HPF  RBC/HPF
 20-50 CASTS/LPF NEGATIVE /LPFCRYSTALS NEGATIVE MUCOUS THRDS NEGATIVE BACTERIA 
NEGATIVE EPITH CELLS FEW SQUAMOUS /HPFTRICHOMONAS NEGATIVE YEAST NEGATIVE CULT 
SET UP? YES 

 

                          1/3/2017 4:08 PM          COLOR YELLOW APPEARANCE HAZY SPEC GRAV 1.015 pH 6.0 PROTEIN 30

 GLUCOSE NEGATIVE mg/dLKETONE NEGATIVE BILIRUBIN NEGATIVE BLOOD MODERATE NITRITE
 NEGATIVE [place] SCREEN LARGE MICRO INDICATED? SEE BELOW WBC/-200 RBC/HPF 
5-10 CASTS/LPF NEGATIVE /LPFCRYSTALS NEGATIVE MUCOUS THRDS NEGATIVE BACTERIA 
NEGATIVE EPITH CELLS 1+ SQUAMOUS /HPFTRICHOMONAS NEGATIVE YEAST NEGATIVE CULT 
SET UP? YES 

 

                          2017 4:24 PM         CREATININE 1.50 mg/dLAGE 66 GFR NonAA 35 GFR AA 42 eGFR 35 eGFR

 AA* 42 VITAMIN B12 1324.0 pg/mL

 

                          2017 2:45 PM         COLOR YELLOW APPEARANCE CLEAR SPEC GRAV <=1.005 pH 6.0 PROTEIN

 NEGATIVE GLUCOSE NEGATIVE mg/dLKETONE NEGATIVE BILIRUBIN NEGATIVE BLOOD TRACE-
INTACT NITRITE NEGATIVE [place]UK SCREEN SMALL MICRO INDICATED? SEE BELOW WBC/HPF 0-5
 RBC/HPF NEGATIVE CASTS/LPF NEGATIVE /LPFCRYSTALS NEGATIVE MUCOUS THRDS NEGATIVE
 BACTERIA FEW EPITH CELLS FEW SQUAMOUS /HPFTRICHOMONAS NEGATIVE YEAST NEGATIVE 
CULT SET UP? YES 

 

                          2017 11:22 AM        COLOR YELLOW APPEARANCE CLEAR SPEC GRAV <=1.005 pH 6.5 PROTEIN

 NEGATIVE GLUCOSE NEGATIVE mg/dLKETONE NEGATIVE BILIRUBIN NEGATIVE BLOOD 
NEGATIVE NITRITE NEGATIVE LEUK SCREEN NEGATIVE MICRO INDICATED? NOT INDICATED 

 

                          2017 2:18 PM         Clarity Ur cloudy Color Ur dark yellow Glucose Ur-sCnc negative

 Bilirub Ur Ql Strip negative Ketones Ur Ql Strip negative Sp Gr Ur Qn 1.010 Hgb
 Ur Ql Strip trace-intact pH Ur-LsCnc 6.5 Prot Ur Ql Strip trace Urobilinogen 
Ur-mCnc 0.2 Nitrite Ur Ql Strip negative WBC Est Ur Ql Strip large 

 

                          2017 9:28 AM         GLUCOSE 109.0 mg/dLSODIUM 142.0 mmol/LPOTASSIUM 3.60 mmol/LCHLORIDE

 106.0 mmol/LCO2 23.0 mmol/LBUN 11.0 mg/dLCREATININE 1.30 mg/dLCALCIUM 9.80 
mg/dLAGE 66 GFR NonAA 41 GFR AA 50 eGFR 41 eGFR AA* 50 

 

                          2017 9:52 AM         COLOR YELLOW APPEARANCE CLOUDY SPEC GRAV <=1.005 pH 6.5 PROTEIN

 NEGATIVE GLUCOSE NEGATIVE mg/dLKETONE NEGATIVE BILIRUBIN NEGATIVE BLOOD SMALL 
NITRITE POSITIVE [place] SCREEN LARGE MICRO INDICATED? SEE BELOW WBC/-300 
RBC/HPF 5-10 CASTS/LPF NEGATIVE /LPFCRYSTALS NEGATIVE MUCOUS THRDS NEGATIVE 
BACTERIA 2++ EPITH CELLS 1+ SQUAMOUS /HPFTRICHOMONAS NEGATIVE YEAST NEGATIVE 
CULT SET UP? YES 

 

                          2017 10:41 AM         COLOR YELLOW APPEARANCE CLEAR SPEC GRAV <=1.005 pH 6.0 PROTEIN

 NEGATIVE GLUCOSE NEGATIVE mg/dLKETONE NEGATIVE BILIRUBIN NEGATIVE BLOOD 
NEGATIVE NITRITE NEGATIVE [place] SCREEN TRACE MICRO INDICATED? SEE BELOW WBC/HPF 
0-5 RBC/HPF RARE CASTS/LPF NEGATIVE /LPFCRYSTALS NEGATIVE MUCOUS THRDS NEGATIVE 
BACTERIA FEW EPITH CELLS 1+ SQUAMOUS /HPFTRICHOMONAS NEGATIVE YEAST NEGATIVE 
CULT SET UP? NO 

 

                          2017 5:10 AM          GLUCOSE 77.0 mg/dLSODIUM 144.0 mmol/LPOTASSIUM 3.80 mmol/LCHLORIDE

 113.0 mmol/LCO2 24.0 mmol/LBUN 11.0 mg/dLCREATININE 1.0 mg/dLSGOT/AST 14.0 
IU/LSGPT/ALT 11.0 IU/LALK PHOS 68.0 IU/LTOTAL PROTEIN 5.0 g/dLALBUMIN 3.40 
g/dLTOTAL BILI 0.30 mg/dLCALCIUM 8.80 mg/dLAGE 66 GFR NonAA 55 GFR AA 67 eGFR 55
 eGFR AA* >60 CARBAMAZEPINE 4.70 ug/mLWBC 3.3 RBC 3.37 HGB 11.10 g/dLHCT 33.80 
%.0 fLMCH 32.90 pgMCHC 32.80 g/dLRDW SD 47 RDW CV 12.80 %MPV 10.0 fLPLT 
184 NRBC# 0.00 NRBC% 0.0 %NEUT 46.80 %%LYMP 38.50 %%MONO 10.10 %%EOS 4.0 %%BASO 
0.30 %#NEUT 1.53 #LYMP 1.26 #MONO 0.33 #EOS 0.13 #BASO 0.01 MANUAL DIFF NOT IND 
RETIC % 1.180 %RETIC # 3.98 IRF 13.4 VITAMIN B12 242.0 pg/mLFOLATE 8.50 ng/mL







History Of Immunizations







       Name    Date Admin    Mfg Name    Mfg Code    Trade Name    Lot#    Route    Inj    Vis Given    Vis

 Pub                                    CVX

 

        Influenza    2008    Not Entered    NE      Not Entered            Not Entered    Not Entered

                    1            999

 

        X       12/19/2008    [employer]    MSD     PNEUMOVAX 23            Intramuscular    Not Entered

                    1            999

 

           Influenza    10/15/2010    [employer].    NOV        Fluvirin > 12 Years    

[id number]     Intramuscular    Left Deltoid    10/15/2010    2009    999

 

          X         3/23/2015    [facility]ela[facility]ederle[facility]ime[facility]    [facility]       PREVNAR 13    [id number]    Intramuscular

                Right Gluteous Medius    3/23/2015       2013       109







History of Past Illness







                    Name                Date of Onset       Comments

 

                    Peritoneal Neoplasm, Malignant                         

 

                    Hyperlipidemia                           

 

                    Bipolar disorder, unspecified                         

 

                    Artificial opening status; colostomy                         

 

                    B12 deficiency                           

 

                    Anemia, Pernicious                         

 

                    Arthritis unspecified                         

 

                    cervical cancer                          

 

                    Artificial opening status; colostomy    2010  1:10PM     

 

                    Bipolar disorder, unspecified    2010  1:10PM     

 

                    Hyperlipidemia      2010  1:10PM     

 

                    Anemia, Pernicious    2010  1:10PM     

 

                    Postoperative Follow-Up    2010  1:55PM     

 

                    Postoperative Follow-Up    Mar  8 2010 10:57AM     

 

                    Artificial opening status; colostomy    Mar  8 2010  1:19PM     

 

                    Peritoneal Neoplasm, Malignant    Mar  8 2010  1:19PM     

 

                    Artificial opening status; colostomy    2010  1:40PM     

 

                    Hyperlipidemia      2010  1:40PM     

 

                    Anemia, Pernicious    2010  1:40PM     

 

                    Peritoneal Neoplasm, Malignant    2010  1:40PM     

 

                    Arthritis unspecified    2010  1:40PM     

 

                    Anemia of Chronic Illness    2010  1:40PM     

 

                    Tinea corporis      2010  3:17PM     

 

                    Bipolar disorder, unspecified    2010  1:33PM     

 

                    Hyperlipidemia      2010  1:33PM     

 

                    Anemia, Pernicious    2010  1:33PM     

 

                    Peritoneal Neoplasm, Malignant    2010  1:33PM     

 

                    B12 deficiency      2010  1:33PM     

 

                    Ethmoidal Sinusitis, Acute    Sep 21 2010  3:53PM     

 

                    Wheezing            Sep 21 2010  3:53PM     

 

                    Flu                 Oct 15 2010  1:40PM     

 

                    Bipolar disorder, unspecified    Oct 15 2010  1:42PM     

 

                    Hyperlipidemia      Oct 15 2010  1:42PM     

 

                    Anemia, Pernicious    Oct 15 2010  1:42PM     

 

                    Peritoneal Neoplasm, Malignant    Oct 15 2010  1:42PM     

 

                    Bipolar disorder, unspecified    2011 12:01PM     

 

                    Hyperlipidemia      2011 12:01PM     

 

                    Anemia, Pernicious    2011 12:01PM     

 

                    Peritoneal Neoplasm, Malignant    2011 12:01PM     

 

                    Bipolar disorder, unspecified    Apr 15 2011 10:55AM     

 

                    Major Depression    2011 10:11AM     

 

                    Bipolar Disorder    2011 10:11AM     

 

                    Cancer              May 10 2011  4:16PM     

 

                    Major Depression    May 10 2011  3:16PM     

 

                    Bipolar Disorder    May 10 2011  3:16PM     

 

                    Hypercalcemia       May 23 2011  2:47PM     

 

                    Bipolar disorder, unspecified    May 23 2011  2:47PM     

 

                    Colon Cancer, Personal History    May 23 2011  2:47PM     

 

                    Bipolar Disorder    May 31 2011  4:39PM     

 

                    Depressive Disorder    2011 10:01AM     

 

                    Vitamin B12 deficiency    2011 10:01AM     

 

                    Vitamin D Deficiency    2011  5:07PM     

 

                    Anemia, Vitamin B12 Deficiency    2011  5:07PM     

 

                    B12 deficiency      2011  3:56PM     

 

                    Routine gynecological examination    Aug  4 2011  9:08AM     

 

                    Screening Examination for Breast Cancer    Aug  4 2011  9:08AM     

 

                    Tinea Corporis      Aug  4 2011  9:08AM     

 

                    Depressive Disorder    Sep 23 2011  8:47AM     

 

                    Contact Dermatitis    Sep 23 2011  8:47AM     

 

                    Anemia, Pernicious    Sep 23 2011  8:47AM     

 

                    B12 deficiency      Sep 23 2011  8:47AM     

 

                    B12 deficiency      Sep 27 2011  2:58PM     

 

                    Renal failure, unspecified                         

 

                    Gout                                     

 

                    Alzheimer disease                         

 

                    dementia                                 

 

                    Hyperlipidemia                           

 

                    History of colon cancer                         

 

                    B12 deficiency      Oct 20 2011  2:34PM     

 

                    Flu                 Dec  9 2011  3:16PM     

 

                    B12 deficiency      Dec  9 2011  3:17PM     

 

                    B12 deficiency      2012  4:52PM     

 

                    B12 deficiency      2012 11:10AM     

 

                    B12 deficiency      2012  3:37PM     

 

                    B12 deficiency      May  3 2012  4:10PM     

 

                    B12 deficiency      2012  2:54PM     

 

                    B12 deficiency      2012 11:23AM     

 

                    B12 deficiency      Aug  9 2012  2:08PM     

 

                    B12 deficiency      Sep  6 2012  4:36PM     

 

                    B12 deficiency      Oct 16 2012 10:23AM     

 

                    Flu                 Feb  2013  3:11PM     

 

                    Bipolar disorder, unspecified    Feb  2013  2:48PM     

 

                    Anemia, Pernicious    Feb  2013  2:48PM     

 

                    B12 deficiency      Feb  4   2:48PM     

 

                    Extrapyramidal abnormal movement disorder    Feb  4   2:48PM     

 

                    B12 deficiency      Apr  3 2013 12:03PM     

 

                    Bipolar disorder, unspecified    May  7 2013  1:31PM     

 

                    Anemia, Pernicious    May  7 2013  1:31PM     

 

                    B12 deficiency      May  7 2013  1:31PM     

 

                    Extrapyramidal abnormal movement disorder    May  7 2013  1:31PM     

 

                    B12 deficiency      2013  3:42PM     

 

                    B12 deficiency      2013  1:31PM     

 

                    Hyperlipidemia      Aug  7 2013 10:37AM     

 

                    Vitamin D Deficiency    Aug  7 2013 10:37AM     

 

                    Bipolar disorder, unspecified    Aug  7 2013 10:37AM     

 

                    Anemia, Pernicious    Aug  7 2013 10:37AM     

 

                    B12 deficiency      Aug  7 2013 10:37AM     

 

                    B12 deficiency      Sep 25 2013 11:15AM     

 

                    B12 deficiency      Dec 11 2013  3:16PM     

 

                    B12 deficiency      Mar  6 2014  1:48PM     

 

                    B12 deficiency      May 21 2014  3:17PM     

 

                    Screening Examination for Breast Cancer    2014  3:23PM     

 

                    Periumbilical abdominal pain    2014  3:23PM     

 

                    B12 deficiency      Jul 10 2014  2:52PM     

 

                    Anemia, Vitamin B12 Deficiency    Aug 13 2014  4:50PM     

 

                    Bipolar disorder    Oct 16 2014 11:13AM     

 

                    Hyperlipidemia      Oct 16 2014 11:13AM     

 

                    Anemia, Pernicious    Oct 16 2014 11:13AM     

 

                    Peritoneal Neoplasm, Malignant    Oct 16 2014 11:13AM     

 

                    Screening breast examination    Oct 16 2014 11:13AM     

 

                    Weight loss         Oct 16 2014 11:13AM     

 

                    Anemia, Pernicious    Mar 23 2015  2:57PM     

 

                    B12 deficiency      Mar 23 2015  2:57PM     

 

                    Need for Prevnar vaccine    Mar 23 2015  2:57PM     

 

                    Bipolar disorder    Mar 23 2015  2:57PM     

 

                    Hyperlipidemia      Mar 23 2015  2:57PM     

 

                    Anemia, Pernicious    Mar 23 2015  2:57PM     

 

                    Peritoneal Neoplasm, Malignant    Mar 23 2015  2:57PM     

 

                    B12 deficiency      May  4 2015  4:48PM     

 

                    Hyperlipidemia      May 13 2015  9:56AM     

 

                    Anemia              May 13 2015  9:56AM     

 

                    Bipolar disorder    May 13 2015  9:56AM     

 

                    Bipolar disorder    May 14 2015  3:27PM     

 

                    Hyperlipidemia      May 14 2015  3:27PM     

 

                    Anemia, Pernicious    May 14 2015  3:27PM     

 

                    Peritoneal Neoplasm, Malignant    May 14 2015  3:27PM     

 

                    B12 deficiency      2015  2:20PM     

 

                    B12 deficiency      2015 11:34AM     

 

                    B12 deficiency      Aug 18 2015  9:06AM     

 

                    Tinea Corporis      Sep 18 2015  8:54AM     

 

                    B12 deficiency      Sep 18 2015  8:54AM     

 

                    B12 deficiency      2015 10:28AM     

 

                    Herpes zoster without complication    Dec  3 2015  9:52AM     

 

                    B12 deficiency      Dec 23 2015 11:21AM     

 

                    B12 deficiency      2016  4:51PM     

 

                    Vitamin B 12 deficiency    Mar 14 2016  5:35PM     

 

                    B12 deficiency      Mar 15 2016 12:14PM     

 

                    B12 deficiency      May  5 2016 11:30AM     

 

                    Edema               May  5 2016 11:30AM     

 

                    Dermatitis          May  5 2016 11:30AM     

 

                    Edema               May 17 2016  8:38AM     

 

                    Shortness of breath    May 17 2016  8:38AM     

 

                    Bilateral edema of lower extremity    2016  2:06PM     

 

                    B12 deficiency      2016  2:06PM     

 

                    B12 deficiency      2016 11:50AM     

 

                    B12 deficiency      2016 11:20AM     

 

                    Diarrhea            Aug  2 2016  3:13PM     

 

                    B12 deficiency      Aug 24 2016 11:10AM     

 

                    Encounter for screening mammogram for breast cancer    Aug 24 2016 11:44AM     

 

                    B12 deficiency      Sep 28 2016  2:35PM     

 

                    B12 deficiency      Dec 15 2016  2:02PM     

 

                    Dysuria             Dec 29 2016 12:14PM     

 

                    Hematuria           Fidencio  3 2017  1:33PM     

 

                    Constipation by delayed colonic transit    2017  1:52PM     

 

                    Ileus               2017  1:52PM     

 

                    UTI (urinary tract infection)    Fidencio 15 2017  3:39PM     

 

                    Acute cystitis with hematuria    2017 11:07AM     

 

                    B12 deficiency      2017 11:07AM     

 

                    B12 deficiency      2017 11:40AM     

 

                    B12 deficiency      2017  4:07PM     

 

                    Slurred speech      2017  3:07PM     

 

                    Vitamin B12 deficiency    2017  3:07PM     

 

                    Dysphagia, unspecified type    2017  3:07PM     

 

                    Hyperlipidemia      2017  3:07PM     

 

                    Dysuria             2017 12:01PM     

 

                    B12 deficiency      2017  2:08PM     

 

                    Dysuria             2017 10:58AM     

 

                    Hematuria           May 22 2017  1:36PM     

 

                    Depressive Disorder    May 22 2017  1:36PM     

 

                    Constipation        May 22 2017  1:36PM     

 

                    Fatigue             May 22 2017  1:36PM     

 

                    Urinary tract infection    May 30 2017  9:36AM     

 

                    Hypokalemia         May 30 2017 12:03PM     

 

                    Urinary tract infection    2017  4:36PM     

 

                    Bipolar disorder, unspecified    Aug 23 2017 11:05AM     

 

                    Anemia, Pernicious    Aug 23 2017 11:05AM     

 

                    B12 deficiency      Aug 23 2017 11:05AM     

 

                    Gingivitis          May 17 2018  2:38PM     

 

                    Colostomy complication    May 17 2018  2:38PM     

 

                    Fatigue             Sep 21 2018  9:36AM     

 

                    Weight loss         Sep 21 2018  9:36AM     







Payers







           Insurance Name    Company Name    Plan Name    Plan Number    Policy Number    Policy Group

 Number                                 Start Date

 

                    Medicare [facility]    Medicare [facility]              [id number]              N/A

 

                          [employer]    [employer]                 [id number]

                                                    

 

                                [facility]      

                    [id number]                             N/A

 

                    Medicare Part A    Medicare - Lab/Xray              [id number]              2006

 

                    Medicare Part B    [employer] [facility] [place]              [id number]              2006

 

                          [employer]                 50 percent

                                                    2009

 

                    [facility]              [id number]              N/A

 

                    Medicare Part A    Medicare Part A              [id number]              N/A

 

                    Medicare Part A    Medicare Part A              [id number]              2006









History of Encounters







                    Visit Date          Visit Type          Provider

 

                    2018           Office visit        Bhupinder Apsen DO

 

                    2018           Office visit        Bhupinder Aspen DO

 

                    2017           Hospital            Pranav Angel MD

 

                    2017           Hospital            EARNEST Lopez MD

 

                    2017           Office visit        Bhupinder Aspen DO

 

                    2017           Nurse visit         Bhupinder Aspen DO

 

                    2017           Office visit         

 

                    2017           Office visit        Bhupinder Aspen DO

 

                    2017           Nurse visit         Bhupinder Aspen DO

 

                    2017           Office visit        Radha Ontiveros APRN

 

                    1/15/2017           Office visit        Aj Tapia NP

 

                    2017            Office visit        Devin Masterson MD

 

                    2016          [facility]            Devin Masterson MD

 

                    12/15/2016          Nurse visit         Bhupinder Aspen DO

 

                    2016           Nurse visit         Bret Forte APRN

 

                    2016           Nurse visit         Bhupinder Aspen DO

 

                    2016            Office visit        Bhupinder Aspen DO

 

                    2016           Nurse visit         Bhupinder Aspen DO

 

                    2016           Office visit        Bret Forte APRN

 

                    2016            Office visit        Bhupinder Aspen DO

 

                    3/15/2016           Nurse visit         Bhupinder Aspen DO

 

                    2016            Nurse visit         Bhupinder Aspen DO

 

                    2015          Nurse visit         Bhupinder Aspen DO

 

                    12/3/2015           Office visit        Bhupinder Aspen DO

 

                    2015          Nurse visit         Bhupinder Aspen DO

 

                    2015           Office visit        Bhupinder Aspen DO

 

                    2015           Nurse visit         Bhupinder Aspen DO

 

                    2015            Nurse visit         Bhupinder Aspen DO

 

                    2015            Nurse visit         Bhupinder Aspen DO

 

                    2015           Office visit        Bhupinder Aspen DO

 

                    2015            Nurse visit         Bhupinder Aspen DO

 

                    3/23/2015           Office visit        Bhupinder Aspen DO

 

                    10/16/2014          Office visit        Bhupinder Aspen DO

 

                    2014           Nurse visit         Radha Ontiveros APRN

 

                    7/10/2014           Nurse visit         Bhupinder Aspen DO

 

                    2014           Office visit        Bhupinder Aspen DO

 

                    2014           Nurse visit         Bhupinder Aspen DO

 

                    3/6/2014            Nurse visit         Bhupinder Aspen DO

 

                    2014            Yasmine Lopez MD

 

                    2013          Nurse visit         Bhupinder Aspen DO

 

                    2013           Nurse visit         Bhupinder Aspen DO

 

                    2013            Office visit        Bhupinder Aspen DO

 

                    2013            Nurse visit         Bhupinder Aspen DO

 

                    2013            Nurse visit         Bhupinder Aspen DO

 

                    2013            Office visit        Bhupinder Aspen DO

 

                    4/3/2013            Nurse visit         Bhupinder Aspen DO

 

                    2013            Office visit        Bhupinder Aspen DO

 

                    10/16/2012          Nurse visit         Bhupinder Aspen DO

 

                    2012            Nurse visit         Bhupinder Aspen DO

 

                    2012            Voided              Bhupinder Aspen DO

 

                    2012            Nurse visit         Bhupinder Aspen DO

 

                    2012            Nurse visit         Bhupinder Aspen DO

 

                    2012           Nurse visit         Bhupinder Aspen DO

 

                    5/3/2012            Nurse visit         Bhupinder Aspen DO

 

                    2012           Nurse visit         Bhupinder Aspen DO

 

                    2012           Nurse visit         Bhupinder Aspen DO

 

                    2012           Nurse visit         Bhupinder Aspen DO

 

                    2011           Nurse visit         Bhupinder Aspen DO

 

                    10/20/2011          Nurse visit         Bhupinder Aspen DO

 

                    2011           Office visit        Bhupinder Aspen DO

 

                    2011           Nurse visit         Radha Ontiveros APRN

 

                    2011            Office visit        Bhupinder Aspen DO

 

                    2011           Nurse visit         Bhupinder Aspen DO

 

                    2011            Office visit        Bhupinder Aspen DO

 

                    2011           Office visit        Bhupinder Aspen DO

 

                    5/10/2011           Office visit        Bhupinder Aspen DO

 

                    2011           Office visit        Bhupinder Aspen DO

 

                    4/15/2011           Office visit        Devin Angel DO

 

                    2011           Office visit        Devin Angel DO

 

                    10/15/2010          Office visit        Devin Angel DO

 

                    2010           Office visit        Devin Angel DO

 

                    2010            Office visit        Devin Angel DO

 

                    2010           Office visit        Devin Angel DO

 

                    2010            Office visit        Devin Angel DO

 

                    3/8/2010            Office visit        Devin Masterson MD

 

                    2010            Surgery             Devin Masterson MD

 

                    2010            Office visit        Devin Angel DO

 

                    2010           Surgery             Devin Masterson MD

 

                    2010           [facility]            Devin Masterson MD

 

                    2010           [facility]            Devin Masterson MD

 

                    10/22/2009          Office visit        Devin Angel DO

## 2019-06-26 NOTE — XMS REPORT
MU2 Ambulatory Summary

                             Created on: 2017



Pauline Gan

External Reference #: [id number]

: 1950

Sex: Female



Demographics







                          Address                   [address]

[place]  [address]

 

                          Home Phone                [phone]

 

                          Preferred Language        English

 

                          Marital Status            Legally 

 

                          Yazidi Affiliation     Unknown

 

                          Race                      White

 

                          Ethnic Group              Not  or 





Author







                          Pranav Washington

 

                          Organization              [facility]

 

                          Address                   [address]

[place]  [id number]



 

                          Phone                     [phone]







Care Team Providers







                    Care Team Member Name    Role                Phone

 

                    Pranav Angel     PCP                 Unavailable

 

                    Bhupinder Louise    PreferredProvider    Unavailable







Allergies and Adverse Reactions







                    Name                Reaction            Notes

 

                    NO KNOWN DRUG ALLERGIES                         







Plan of Treatment







             Planned Activity    Comments     Planned Date    Planned Time    Plan/Goal

 

             Injection,Subcutaneous/Intramuscul, [facility] [employer]                 2017    12:00 AM      

 

             Carbamazepine level                 2017     12:00 AM      

 

             CBC W/ AUTO DIFF (RFLX MAN DIFF IF IND).                 2017     12:00 AM      

 

             CMP                       2017     12:00 AM      

 

             Retic count                 2017     12:00 AM      

 

             VITAMIN B12                 2017     12:00 AM      

 

             Folate measurement                 2017     12:00 AM      







Medications







                                        Active 

 

             Name         Start Date    Estimated Completion Date    SIG          Comments

 

                Latuda 20 mg oral tablet                                    take 1 tablet (20 mg) by oral route once daily with

 food (at least 350 calories)            

 

             pravastatin 40 mg oral tablet    3/30/2015                 TAKE 1 TABLET BY MOUTH DAILY     

 

                Namenda XR 28 mg oral capsule,sprinkle,ER 24hr    2015                       take 1 capsule (28

 mg) by oral route once daily            

 

                Namenda XR 28 mg oral capsule,sprinkle,ER 24hr    2016                       take 1 capsule (28

 mg) by oral route once daily            

 

                potassium chloride 10 mEq oral tablet extended release    2016                       take 1 tablet

 (10 meq) by oral route once daily       

 

             pravastatin 40 mg oral tablet    2016                 TAKE 1 TABLET BY MOUTH DAILY     

 

                Vitamin B-12 1,000 mcg/mL injection solution    2016                       inject 1 milliliter 

(1,000 mcg) by intramuscular route once a month     

 

                potassium chloride 10 mEq oral tablet extended release    2016                      take 1 tablet

 (10 meq) by oral route once daily       

 

                Namenda XR 28 mg oral capsule,sprinkle,ER 24hr    2016                      TAKE 1 CAPSULE BY

 MOUTH EVERY DAY                         

 

                furosemide 40 mg oral tablet    2016                      take 1 tablet (40 mg) by oral route

 once daily                              

 

                mirtazapine 45 mg oral tablet                                    take 1 tablet (45 mg) by oral route once daily

 before bedtime                          

 

             Fish Oil 300-1,000 mg oral capsule                              take 1 capsule by oral route daily     

 

             Vitamin D3 1,000 unit oral tablet                              take 1 tablet by oral route daily     

 

                Calcium 600 600 mg calcium (1,500 mg) oral tablet                                    take 1 tablet by oral route

 daily                                   

 

                Aspirin Low Dose 81 mg oral tablet,delayed release (DR/EC)                                    take 1 tablet 

(81 mg) by oral route once daily         

 

                metoclopramide HCl 10 mg oral tablet    2017                       take 1 tablet by oral route 

2 times a day for 50 days                

 

             furosemide 40 mg oral tablet    2017                 TAKE 1 TABLET BY MOUTH DAILY     

 

                Namenda XR 28 mg oral capsule,sprinkle,ER 24hr    2017                       TAKE 1 CAPSULE BY 

MOUTH EVERY DAY                          

 

                Linzess 72 mcg oral capsule    2017                       take 1 capsule (72 mcg) by oral route

 once daily on an empty stomach at least 30 minutes before 1st meal of the day     



 

             pravastatin 40 mg oral tablet    2017                 TAKE 1 TABLET BY MOUTH DAILY     

 

                metoclopramide HCl 10 mg oral tablet    2017                       TAKE 1 TABLET BY ORAL ROUTE 

2 TIMES A DAY FOR 50 DAYS                

 

                potassium chloride 10 mEq oral tablet extended release    2017                       TAKE 2 TABLETs

 BY MOUTH twice DAILY for one week       

 

                potassium chloride 10 mEq oral tablet extended release    2017                       TAKE 2 TABLETs

 BY MOUTH twice DAILY for one week       

 

             simvastatin 20 mg oral tablet    2017                 TAKE 1 TABLET BY MOUTH AT BEDTIME    

 

 

             famotidine 20 mg oral tablet    2017                 TAKE 1 TABLET BY MOUTH TWICE DAILY    

 

 

             memantine 10 mg oral tablet    2017                 TAKE 1 TABLET BY MOUTH TWICE DAILY     



 

                rivastigmine tartrate 1.5 mg oral capsule    2017                       TAKE 1 CAPSULE BY MOUTH

 TWICE DAILY BEFORE MEALS                

 

             famotidine 20 mg oral tablet    2017                 TAKE 1 TABLET BY MOUTH TWICE DAILY    

 

 

                carbamazepine 200 mg oral tablet    2017                       TAKE 1 TABLET BY MOUTH BEFORE BREAKFAST

 AND TAKE 2 TABLETS AT BEDTIME           

 

                carbamazepine 200 mg oral tablet    2017                       TAKE 1 TABLET BY MOUTH BEFORE BREAKFAST

 AND TAKE 2 TABLETS AT BEDTIME           

 

                aspirin 81 mg oral tablet,delayed release (DR/EC)    2017                       TAKE 1 TABLET BY

 MOUTH EVERY DAY                         

 

             BISACODYL 5MG PV (BOX OF 25)    2017                 TAKE 1 TABLET BY MOUTH EVERY DAY     

 

             MULTI-VITAMINS    2017                 TAKE 1 TABLET BY MOUTH EVERY DAY     









                                         

 

             Name         Start Date    Expiration Date    SIG          Comments

 

             Reglan 10mg    3/29/2010    2010    one ac and hs     

 

                Keflex 500 mg oral capsule    2010       10/1/2010       take 1 capsule (500 mg) by oral

 route every 6 hours for 10 days         

 

                Bactrim -160 mg oral tablet    2011       take 1 tablet by oral route

 every 12 hours for 7 days               

 

                triamcinolone acetonide 0.1 % topical cream    2011      apply a thin

 layer to the affected area(s) by topical route 2 times per day     

 

                sertraline 100 mg oral tablet    4/10/2012       5/10/2012       take 1.5 tablets by oral route

 daily for 30 days                       

 

                ergocalciferol (vitamin D2) 50,000 unit oral capsule    4/15/2013       2013       TAKE

 ONE CAPSULE BY MOUTH ONCE A WEEK        

 

                CYANOCOBALAM 1000MCGINJ 1000 milliliter    2013       INJECT 1ML INTRAMUSCULAR

 ONCE A MONTH                            

 

                pravastatin 40 mg oral tablet    3/25/2014       3/20/2015       TAKE ONE TABLET BY MOUTH EVERY

 DAY                                     

 

                          Zostavax (PF) 19,400 unit/0.65 mL subcutaneous suspension for reconstitution    3/23/2015

                    3/24/2015           inject 0.65 milliliter by subcutaneous route once     

 

                famciclovir 500 mg oral tablet    12/3/2015       12/10/2015      take 1 tablet (500 mg) by

 oral route every 8 hours for 7 days     

 

                furosemide 40 mg oral tablet    2016      take 1 tablet (40 mg) by oral

 route once daily                        

 

                Cipro 500 mg oral tablet    2016       take 1 tablet (500 mg) by oral route

 2 times per day for 5 days              

 

                Bactrim -160 mg oral tablet    2016        take 1 tablet by oral route

 every 12 hours for 7 days               

 

                metoclopramide HCl 10 mg oral tablet    2017       take 1 tablet by oral

 route 2 times a day for 50 days         

 

                Macrobid 100 mg oral capsule    2017       take 1 capsule (100 mg) by oral

 route 2 times per day with food for 7 days     

 

                Augmentin 875-125 mg oral tablet    2017       take 1 tablet by oral route

 every 12 hours for 7 days               

 

                amoxicillin 500 mg oral tablet    2017       take 1 tablet (500 mg) by oral

 route every 12 hours for 7 days         

 

                ciprofloxacin HCl 500 mg oral tablet    2017       take 1 tablet (500 mg)

 by oral route every 12 hours for 5 days     

 

                cefuroxime axetil 500 mg oral tablet    2017        take 1 tablet (500 mg)

 by oral route 2 times per day for 10 days     









                                        Discontinued 

 

             Name         Start Date    Discontinued Date    SIG          Comments

 

                Tylenol 325 mg oral tablet                    2013        take 1 - 2 tablets (325 -650 mg) by oral

 route every 4-6 hours as needed         

 

                Calcium 600 + D(3) 600 mg(1,500mg) -400 unit oral tablet                    2011       take 1 tablet

 by oral route 2 times a day            no longer taking

 

                Vitamin B-12 1,000 mcg oral tablet extended release    2010       take 1

 tablet by oral route daily             no longer taking

 

                Antifungal (clotrimazole) 1 % topical cream    2010       apply to the 

affected and surrounding areas of skin by topical route 2 times per day morning 
and evening                              

 

                sertraline 100 mg oral tablet    5/10/2011       2011       take 2 tablets (200 mg) by 

oral route once daily                   discontinued by Dr. Serrano

 

                mirtazapine 15 mg oral tablet                    2011        take 1 tablet (15 mg) by oral route 

once daily before bedtime               Dr. Serrano

 

                mirtazapine 15 mg oral tablet                    2011        take 1 tablet (15 mg) by oral route 

once daily before bedtime               dc'd by Dr. Serrano

 

                Pristiq 50 mg oral tablet extended release 24 hr                    2013        take 1 tablet (50

 mg) by oral route once daily           Dr. Serrano

 

                Pristiq 50 mg oral tablet extended release 24 hr                    2013        take 1 tablet (50

 mg) by oral route once daily           dose updated

 

                Vitamin B-12 1,000 mcg/mL injection solution    2011        inject 1 milliliter

 (1,000 mcg) by intramuscular route once a month    on list already

 

                    syringe with needle 1 mL 25 gauge x 1" miscellaneous syringe    2011

                          use for injection once a month     

 

                clotrimazole 1 % topical cream    2011        apply to the affected and surrounding

 areas of skin by topical route 2 times per day in the morning and evening     

 

                Vitamin D2 50,000 unit oral capsule    2011        take 1 capsule (50,000

 unit) by oral route once weekly        generic on list

 

                Pravachol 40 mg oral tablet    2012        take 1 tablet (40 mg) by oral 

route once daily for 90 days            generic on list

 

                lithium carbonate 300 mg oral capsule    2012        take 1 capsule by oral

 route daily                            dose updated

 

                Pristiq 100 mg oral tablet extended release 24 hr                    4/10/2012       take 1 and 1/2 

tablet (150 mg) by oral route once daily    Mental Health provider

 

                Pristiq 100 mg oral tablet extended release 24 hr                    4/10/2012       take 1 and 1/2 

tablet (150 mg) by oral route once daily    Discontinued by Dr Efrain Knight at [facility]

 

                hydroxyzine HCl 50 mg oral tablet    10/16/2014      2015       take 1 tablet (50 mg) 

by oral route at bedtime                 

 

                lithium carbonate 300 mg oral capsule    2015       take 1 capsule (300

 mg) by oral route 2 for 30 days         

 

                fluconazole 100 mg oral tablet    2015       12/3/2015       take 1 tablet (100 mg) by 

oral route once a week                   

 

                ketoconazole 2 % topical cream    2015       12/3/2015       apply to the affected area(s)

 by topical route 2 times per day        

 

                prednisone 10 mg oral tablet    12/3/2015       2016        take 2 tablets (20 mg) by oral

 route once daily for 4 days 1 tablet daily for 4 days 0.5 tablet daily for 4 
days                                     

 

                triamcinolone acetonide 0.1 % topical cream    2016       apply a thin layer

 to the affected area(s) by topical route 2 times per day     

 

                Cipro 500 mg oral tablet    1/15/2017       2017       take 1 tablet (500 mg) by oral route

 every 12 hours for 10 days              







Problem List







                    Description         Status              Onset

 

                    Artificial opening status; colostomy    Active               

 

                    Bipolar disorder, unspecified    Active               

 

                    Hyperlipidemia      Active               

 

                    Peritoneal Neoplasm, Malignant    Active               

 

                    Anemia, Pernicious    Active               

 

                    Arthritis unspecified    Active               

 

                    B12 deficiency      Active               







Vital Signs







      Date    Time    BP-Sys(mm[Hg]    BP-Lynn(mm[Hg])    HR(bpm)    RR(rpm)    Temp    WT    HT    HC    BMI

                    BSA                 BMI Percentile      O2 Sat(%)

 

        2017    1:34:00 PM    118 mmHg    62 mmHg    122 bpm    18 rpm    97.8 F    161.375 lbs    

69 in                     23.83 kg/m2    1.89 m2                   96 %

 

        2017    3:05:00 PM    134 mmHg    70 mmHg    70 bpm    20 rpm    97.4 F    181 lbs    69 in

                          26.7288 kg/m    1.9992 m                 98 %

 

        2017    11:07:00 AM    124 mmHg    64 mmHg    62 bpm    17 rpm    98.2 F    181.2 lbs    69

 in                       26.76 kg/m2    2.00 m2                   98 %

 

        1/15/2017    3:34:00 PM    148 mmHg    89 mmHg    69 bpm    20 rpm    98.2 F    179 lbs    69 in

                          26.4334 kg/m    1.9882 m                 98 %

 

       2017    1:51:00 PM    160 mmHg    90 mmHg    100 bpm    20 rpm    96.5 F    179 lbs             

                                                                98 %

 

       2016    3:11:00 PM    134 mmHg    76 mmHg    80 bpm    20 rpm    98 F    163 lbs    69 in     

                24.0706 kg/m    1.8972 m                      98 %

 

        2016    2:04:00 PM    142 mmHg    86 mmHg    68 bpm    16 rpm    98.5 F    166 lbs    63 in

                          29.41 kg/m2    1.83 m2                   100 %

 

        2016    11:27:00 AM    148 mmHg    78 mmHg    90 bpm    20 rpm    98.2 F    153 lbs    69 in

                          22.5939 kg/m    1.8381 m                 96 %

 

        12/3/2015    9:50:00 AM    132 mmHg    70 mmHg    62 bpm    16 rpm    97.9 F    145 lbs    69 in

                          21.41 kg/m2    1.79 m2                   100 %

 

        2015    8:52:00 AM    132 mmHg    68 mmHg    52 bpm    20 rpm    97.8 F    141 lbs    69 in

                          20.8218 kg/m    1.7645 m                 100 %

 

        2015    3:25:00 PM    120 mmHg    62 mmHg    72 bpm    16 rpm    98.1 F    136 lbs    69 in

                          20.08 kg/m2    1.73 m2                   98 %

 

       3/23/2015    2:55:00 PM    130 mmHg    76 mmHg    68 bpm    18 rpm    97 F    140 lbs    69 in    

                20.6742 kg/m    1.7583 m                      98 %

 

        10/16/2014    11:11:00 AM    120 mmHg    66 mmHg    77 bpm    20 rpm    98 F    130 lbs    69 in

                          19.20 kg/m2    1.69 m2                   100 %

 

        2014    3:21:00 PM    130 mmHg    66 mmHg    63 bpm    18 rpm    97.2 F    160 lbs    69 in

                          23.6276 kg/m    1.8797 m                 99 %

 

        2013    10:35:00 AM    132 mmHg    70 mmHg    66 bpm    20 rpm    98.1 F    157 lbs    69 in

                          23.18 kg/m2    1.86 m2                    

 

        2013    1:29:00 PM    132 mmHg    70 mmHg    76 bpm    18 rpm    98.2 F    166 lbs    69 in 

                          24.5137 kg/m    1.9146 m                  

 

       2013    2:46:00 PM    128 mmHg    70 mmHg    76 bpm    16 rpm    98 F    160 lbs    69 in     

                23.63 kg/m2     1.88 m2                          

 

        2011    8:49:00 AM    128 mmHg    78 mmHg    70 bpm    18 rpm    97.9 F    164 lbs    69 in

                          24.2183 kg/m    1.903 m                  

 

     2011    1:31:00 PM    132 mmHg    68 mmHg    84 bpm         97 F    167 lbs                        

                                         

 

        2011    9:09:00 AM    128 mmHg    70 mmHg    72 bpm    18 rpm    98.2 F    163 lbs    64 in 

                          27.9786 kg/m    1.8272 m                  

 

       2011    10:01:00 AM    132 mmHg    70 mmHg    72 bpm    18 rpm    98.2 F    154 lbs             

                                                                 

 

       2011    2:47:00 PM    128 mmHg    70 mmHg    72 bpm    18 rpm    97.8 F    156 lbs             

                                                                 

 

       5/10/2011    3:16:00 PM    144 mmHg    80 mmHg    72 bpm    18 rpm    98.2 F    158 lbs             

                                                                 

 

        2011    10:11:00 AM    132 mmHg    70 mmHg    70 bpm    18 rpm    98.2 F    168 lbs    69 in

                          24.809 kg/m    1.9261 m                  

 

        4/15/2011    10:52:00 AM    110 mmHg    60 mmHg    75 bpm    16 rpm    97.5 F    172.375 lbs    

69 in                     25.46 kg/m2    1.95 m2                   100 %

 

        2011    11:43:00 AM    120 mmHg    82 mmHg    75 bpm    16 rpm    97.2 F    178.5 lbs    69

 in                       26.3596 kg/m    1.9854 m                 100 %

 

        10/15/2010    1:32:00 PM    120 mmHg    70 mmHg    80 bpm    18 rpm    96.6 F    177 lbs    69 in

                          26.14 kg/m2    1.98 m2                   100 %

 

        2010    3:50:00 PM    168 mmHg    100 mmHg    82 bpm    18 rpm    97.8 F    177.5 lbs    69

 in                       26.2119 kg/m    1.9798 m                 97 %

 

        2010    1:21:00 PM    140 mmHg    80 mmHg    59 bpm    16 rpm    97.6 F    173.25 lbs    69 

in                        25.58 kg/m2    1.96 m2                   100 %

 

        2010    3:02:00 PM    140 mmHg    80 mmHg    61 bpm    16 rpm    97.6 F    173.125 lbs    69

 in                       25.5658 kg/m    1.9553 m                 99 %

 

        2010    1:23:00 PM    130 mmHg    80 mmHg    66 bpm    16 rpm    96.8 F    173 lbs    69 in 

                          25.55 kg/m2    1.95 m2                   100 %

 

        2010    12:58:00 PM    130 mmHg    88 mmHg    75 bpm    16 rpm    98.4 F    172.25 lbs    69

 in                       25.4366 kg/m    1.9503 m                 100 %







Social History







                    Name                Description         Comments

 

                    denies alcohol use                         

 

                    denies smoking                           

 

                    Denies illicit substance abuse                         

 

                    retired                                 direct care

 

                    Single                                   

 

                    Exercises regularly                         

 

                    Attended some college                         

 

                    Tobacco             Never smoker         







History of Procedures







                    Date Ordered        Description         Order Status

 

                    2010 12:00 AM    COMPREHEN METABOLIC PANEL    Reviewed

 

                    2010 12:00 AM    COMPLETE CBC W/AUTO DIFF WBC    Reviewed

 

                    2010 12:00 AM    LIPID PANEL         Reviewed

 

                          2015 12:00 AM        B12 Injection, Up to 1000 Mcg NDC#6234-5829-56 [facility] Medicare 

                                        Reviewed

 

                    2011 12:00 AM    MAMMOGRAM SCREENING    Reviewed

 

                    2011 12:00 AM    CYTOPATH C/V THIN LAYER    Reviewed

 

                    2011 12:00 AM    B12 Injection 1 cc NDC#[address]-39    Reviewed

 

                    2015 12:00 AM    THER/PROPH/DIAG INJ SC/IM    Reviewed

 

                    2015 12:00 AM    B12 Injection, Up to 1000 Mcg NDC#5946-4495-91    Reviewed

 

                    2011 12:00 AM    THER/PROPH/DIAG INJ SC/IM    Reviewed

 

                    2011 12:00 AM    B12 Injection([place]) Ndc#0896-2435-52-    Reviewed

 

                    2015 12:00 AM    THER/PROPH/DIAG INJ SC/IM    Reviewed

 

                    2015 12:00 AM    B12 Injection, Up to 1000 Mcg NDC#4106-8214-77    Reviewed

 

                    10/20/2011 12:00 AM    THER/PROPH/DIAG INJ SC/IM    Reviewed

 

                    10/20/2011 12:00 AM    B12 Injection([place]) Ndc#3260-2686-83-    Reviewed

 

                    2016 12:00 AM    THER/PROPH/DIAG INJ SC/IM    Reviewed

 

                    2016 12:00 AM    B12 Injection, Up to 1000 Mcg NDC#6411-9186-98    Reviewed

 

                    3/14/2016 12:00 AM    VITAMIN B-12        Reviewed

 

                    3/15/2016 12:00 AM    THER/PROPH/DIAG INJ SC/IM    Reviewed

 

                    3/15/2016 12:00 AM    B12 Injection, Up to 1000 Mcg NDC#7648-6814-56    Reviewed

 

                    2011 12:00 AM    ***Immunization administration, Medicare flu    Reviewed

 

                    2011 12:00 AM    Fluzone ** MEDICARE Only **    Reviewed

 

                    2011 12:00 AM    THER/PROPH/DIAG INJ SC/IM    Reviewed

 

                    2011 12:00 AM    B12 Injection ([facility]) Ndc#5700-5427-47    Reviewed

 

                    2016 12:00 AM    B12 Injection, Up to 1000 Mcg NDC#1744-1011-64 [facility] Medicare    

Reviewed

 

                    2016 12:00 AM    TTE W/DOPPLER COMPLETE    Reviewed

 

                    2016 12:00 AM    EXTREMITY STUDY     Reviewed

 

                          2016 12:00 AM        B12 Injection, Up to 1000 Mcg NDC#2083-0738-61 [facility] Medicare 

                                        Reviewed

 

                    2016 12:00 AM    THER/PROPH/DIAG INJ SC/IM    Reviewed

 

                    2016 12:00 AM    B12 Injection, Up to 1000 Mcg NDC#2589-7470-22    Reviewed

 

                    2016 12:00 AM    THER/PROPH/DIAG INJ SC/IM    Reviewed

 

                    2012 12:00 AM    B12 Injection([facility]) Ndc#3844-3671-60-    Reviewed

 

                    2016 12:00 AM    B12 Injection, Up to 1000 Mcg NDC#6744-7243-39    Reviewed

 

                    2016 12:00 AM    THER/PROPH/DIAG INJ SC/IM    Reviewed

 

                    2012 12:00 AM    THER/PROPH/DIAG INJ SC/IM    Reviewed

 

                    2012 12:00 AM    B12 Injection ([employer]) Ndc#8069-3551-65    Reviewed

 

                    2016 12:00 AM    THER/PROPH/DIAG INJ SC/IM    Reviewed

 

                    2016 12:00 AM    B12 Injection, Up to 1000 Mcg NDC#9266-8387-80    Reviewed

 

                    2016 12:00 AM    B12 Injection, Up to 1000 Mcg NDC#4501-8858-86    Reviewed

 

                    2016 12:00 AM    THER/PROPH/DIAG INJ SC/IM    Reviewed

 

                    2012 12:00 AM    THER/PROPH/DIAG INJ SC/IM    Reviewed

 

                    2012 12:00 AM    B12 Injection([facility]) Ndc#5657-5804-12-    Reviewed

 

                    12/15/2016 12:00 AM    B12 Injection, Up to 1000 Mcg NDC#5763-2320-40    Reviewed

 

                    12/15/2016 12:00 AM    THER/PROPH/DIAG INJ SC/IM    Reviewed

 

                    2016 12:00 AM    URNLS DIP STICK/TABLET RGNT AUTO W/O MICROSCOPY    Reviewed

 

                    1/3/2017 12:00 AM    URNLS DIP STICK/TABLET RGNT AUTO W/O MICROSCOPY    Reviewed

 

                    2017 12:00 AM    URINE CULTURE/COLONY COUNT    Reviewed

 

                    2017 12:00 AM    Rocephin 1 gram Injection, [facility]    Reviewed

 

                    2017 12:00 AM    THERAPEUTIC PROPHYLACTIC/DX INJECTION SUBQ/IM    Reviewed

 

                    2017 12:00 AM    B12 1000mcg Injection, [facility] Medicare    Reviewed

 

                    5/3/2012 12:00 AM    THER/PROPH/DIAG INJ SC/IM    Reviewed

 

                    5/3/2012 12:00 AM    B12 Injection([facility]) Ndc#0101-7851-39-    Reviewed

 

                    2017 12:00 AM    THERAPEUTIC PROPHYLACTIC/DX INJECTION SUBQ/IM    Reviewed

 

                    2017 12:00 AM    B12 1000mcg Injection, [facility]    Reviewed

 

                    2017 12:00 AM    MRI BRAIN STEM W/O & W/DYE    Reviewed

 

                    2017 12:00 AM    VITAMIN B-12        Reviewed

 

                    2017 12:00 AM    Speech Therapy Consult    Reviewed

 

                    2017 12:00 AM    ASSAY OF CREATININE    Reviewed

 

                    2012 12:00 AM    IMMUNOTHERAPY INJECTIONS    Reviewed

 

                    2012 12:00 AM    B12 Injection([facility]) Ndc#5017-8169-62-    Reviewed

 

                    2017 12:00 AM    URINALYSIS AUTO W/SCOPE    Reviewed

 

                    2012 12:00 AM    THER/PROPH/DIAG INJ SC/IM    Reviewed

 

                    2012 12:00 AM    B12 Injection, Up to 1000 Mcg NDC#0952-5498-19    Reviewed

 

                    2017 12:00 AM    URINALYSIS AUTO W/SCOPE    Reviewed

 

                    2017 2:18 PM    URINALYSIS AUTO W/O SCOPE    Reviewed

 

                    2017 12:00 AM    URINE CULTURE/COLONY COUNT    Reviewed

 

                    2017 12:00 AM    B12 1000mcg Injection    Reviewed

 

                    2017 12:00 AM    URNLS DIP STICK/TABLET RGNT AUTO W/O MICROSCOPY    Reviewed

 

                    2017 12:00 AM    METABOLIC PANEL TOTAL CA    Reviewed

 

                    2017 12:00 AM    URNLS DIP STICK/TABLET RGNT AUTO W/O MICROSCOPY    Reviewed

 

                    2012 12:00 AM    THER/PROPH/DIAG INJ SC/IM    Reviewed

 

                    2012 12:00 AM    B12 Injection, Up to 1000 Mcg NDC#4552-9349-15    Reviewed

 

                    2012 12:00 AM    THER/PROPH/DIAG INJ SC/IM    Reviewed

 

                    2012 12:00 AM    B12 Injection, Up to 1000 Mcg NDC#7853-3579-86    Reviewed

 

                    10/16/2012 12:00 AM    THER/PROPH/DIAG INJ SC/IM    Reviewed

 

                    10/16/2012 12:00 AM    B12 Injection, Up to 1000 Mcg NDC#4995-2832-52    Reviewed

 

                    2010 12:00 AM    COMPREHEN METABOLIC PANEL    Reviewed

 

                    2010 12:00 AM    COMPLETE CBC W/AUTO DIFF WBC    Reviewed

 

                    2010 12:00 AM    LIPID PANEL         Reviewed

 

                    2013 12:00 AM    Flu Injection 3 Years And Above NDC# [address]-60  [facility]    Reviewed



 

                    2013 12:00 AM    COMPLETE CBC W/AUTO DIFF WBC    Reviewed

 

                    2013 12:00 AM    ASSAY OF LITHIUM    Reviewed

 

                    2013 12:00 AM    METABOLIC PANEL TOTAL CA    Reviewed

 

                    4/3/2013 12:00 AM    THER/PROPH/DIAG INJ SC/IM    Reviewed

 

                    4/3/2013 12:00 AM    B12 Injection, Up to 1000 Mcg NDC#8761-5626-38    Reviewed

 

                    2013 12:00 AM    THER/PROPH/DIAG INJ SC/IM    Reviewed

 

                    2013 12:00 AM    B12 Injection, Up to 1000 Mcg NDC#5187-4211-35    Reviewed

 

                    2013 12:00 AM    THER/PROPH/DIAG INJ SC/IM    Reviewed

 

                    2013 12:00 AM    B12 Injection, Up to 1000 Mcg NDC#7678-9298-16    Reviewed

 

                    2013 12:00 AM    LIPID PANEL         Reviewed

 

                    2013 12:00 AM    VITAMIN D 25 HYDROXY    Reviewed

 

                    2013 12:00 AM    THER/PROPH/DIAG INJ SC/IM    Reviewed

 

                    2013 12:00 AM    B12 Injection, Up to 1000 Mcg NDC#0575-1062-36    Reviewed

 

                    2013 12:00 AM    THER/PROPH/DIAG INJ SC/IM    Reviewed

 

                    3/6/2014 12:00 AM    THER/PROPH/DIAG INJ SC/IM    Reviewed

 

                    2014 12:00 AM    THER/PROPH/DIAG INJ SC/IM    Reviewed

 

                    2014 12:00 AM    B12 Injection, Up to 1000 Mcg NDC#4841-9867-60    Reviewed

 

                    2010 12:00 AM    SKIN FUNGI CULTURE    Reviewed

 

                    10/9/2010 12:00 AM    COMPREHEN METABOLIC PANEL    Reviewed

 

                    10/9/2010 12:00 AM    LIPID PANEL         Reviewed

 

                    2010 12:00 AM    THER/PROPH/DIAG INJ SC/IM    Reviewed

 

                    2010 12:00 AM    B12 Injection Ndc#[address]-38 ([place])    Reviewed

 

                    2010 12:00 AM    THER/PROPH/DIAG INJ SC/IM    Reviewed

 

                    2010 12:00 AM    Kenalog 40 Mg Im-Ndc#[address]-48 ([facility])    Reviewed

 

                    10/15/2010 12:00 AM    FLU VACCINE 3 YRS & > IM    Reviewed

 

                    10/15/2010 12:00 AM    Admin.Of M/C Cov.Vaccine-Flu Vacc.    Reviewed

 

                    1/15/2011 12:00 AM    COMPLETE CBC W/AUTO DIFF WBC    Reviewed

 

                    1/15/2011 12:00 AM    COMPREHEN METABOLIC PANEL    Reviewed

 

                    1/15/2011 12:00 AM    LIPID PANEL         Reviewed

 

                    2014 12:00 AM    MAMMOGRAM SCREENING    Reviewed

 

                    2014 12:00 AM    Screening mammography, bilateral    Reviewed

 

                    7/10/2014 12:00 AM    THER/PROPH/DIAG INJ SC/IM    Reviewed

 

                    7/10/2014 12:00 AM    B12 Injection, Up to 1000 Mcg NDC#2113-6462-67    Reviewed

 

                    2011 12:00 AM    COMPLETE CBC W/AUTO DIFF WBC    Reviewed

 

                    2011 12:00 AM    COMPREHEN METABOLIC PANEL    Reviewed

 

                    2011 12:00 AM    LIPID PANEL         Reviewed

 

                    2014 12:00 AM    B12 Injection, Up to 1000 Mcg NDC#6015-8116-13    Reviewed

 

                    10/19/2014 12:00 AM    MAMMOGRAM SCREENING    Reviewed

 

                    10/19/2014 12:00 AM    Screening mammography, bilateral    Reviewed

 

                    10/16/2014 12:00 AM    B12 Injection, Up to 1000 Mcg NDC#0051-5231-87    Reviewed

 

                    10/16/2014 12:00 AM    COMPLETE CBC W/AUTO DIFF WBC    Reviewed

 

                    10/16/2014 12:00 AM    COMPREHEN METABOLIC PANEL    Reviewed

 

                    10/16/2014 12:00 AM    IMMUNOASSAY TUMOR     Reviewed

 

                    10/16/2014 12:00 AM    LIPID PANEL         Reviewed

 

                    10/16/2014 12:00 AM    ASSAY OF LITHIUM    Reviewed

 

                    10/16/2014 12:00 AM    MAMMOGRAM SCREENING    Reviewed

 

                    2011 12:00 AM    ASSAY OF PARATHORMONE    Reviewed

 

                    2011 12:00 AM    VITAMIN D 25 HYDROXY    Reviewed

 

                    2011 12:00 AM    ASSAY OF LITHIUM    Reviewed

 

                    2011 12:00 AM    METABOLIC PANEL TOTAL CA    Reviewed

 

                    2011 12:00 AM    CT HEAD/BRAIN W/O & W/DYE    Reviewed

 

                    3/23/2015 12:00 AM    PNEUMOCOCCAL VACC 13 GLENDY IM    Reviewed

 

                    3/23/2015 12:00 AM    Vitamin B12 injection    Reviewed

 

                    2011 12:00 AM    ASSAY OF LITHIUM    Reviewed

 

                    2011 12:00 AM    B12 Injection Ndc#[address]-23  [place]    Reviewed

 

                    2015 12:00 AM    THER/PROPH/DIAG INJ SC/IM    Reviewed

 

                    2015 12:00 AM    B12 Injection, Up to 1000 Mcg NDC#7112-3738-51    Reviewed

 

                    2015 12:00 AM    COMPLETE CBC W/AUTO DIFF WBC    Reviewed

 

                    2015 12:00 AM    COMPREHEN METABOLIC PANEL    Reviewed

 

                    2015 12:00 AM    LIPID PANEL         Reviewed

 

                    2015 12:00 AM    ASSAY OF LITHIUM    Reviewed

 

                    2011 12:00 AM    VIT D 1 25-DIHYDROXY    Reviewed

 

                    2011 12:00 AM    VITAMIN B-12        Reviewed

 

                    2015 12:00 AM    B12 Injection, Up to 1000 Mcg NDC#3123-7474-59    Reviewed

 

                    2015 12:00 AM    THER/PROPH/DIAG INJ SC/IM    Reviewed

 

                    2015 12:00 AM    B12 Injection, Up to 1000 Mcg NDC#5318-5132-63    Reviewed

 

                    2011 12:00 AM    THER/PROPH/DIAG INJ SC/IM    Reviewed

 

                    2011 12:00 AM    B12 Injection ([employer]) Ndc#0543-0459-87    Reviewed

 

                    2015 12:00 AM    THER/PROPH/DIAG INJ SC/IM    Reviewed

 

                    2015 12:00 AM    B12 Injection, Up to 1000 Mcg NDC#6834-0574-86    Reviewed







Results Summary







                          Date and Description      Results

 

                          2004 12:00 AM        Colonoscopy-Women and Men over 50 Normal 

 

                          2008 12:00 AM         Pap Smear Declined 

 

                          10/7/2009 12:00 AM        Cholest Cry Stone Ql .0 %LDLc SerPl-mCnc 123.0 mg/dLHDLc

 SerPl-mCnc 34.0 mg/dLTrigl SerPl-mCnc 190.0 mg/dLGlucose SerPl-mCnc 78.0 mg/dL

 

                          2009 12:00 AM        Mammogram -Women over 40 Normal HIV1+2 Ab Ser Ql no risk 

 

                          2010 8:47 AM         Dexa Bone Scan Refused Aspirin reccommended Contraindication 



 

                          2010 8:48 AM         Depression Done 

 

                          2010 12:00 AM         Foot Exam-Diabetic Done 

 

                          2010 12:00 AM         Cholest Cry Stone Ql .0 %LDLc SerPl-mCnc 126.0 mg/dLGlucose

 SerPl-mCnc 102.0 mg/dL

 

                          2010 8:45 AM          TRIGLYCERIDES 122.0 mg/dLCHOLESTEROL 186.0 mg/dLHDL 36.0 mg/dLTOT

 CHOL/HDL 5.2 LDL (CALC) 126.0 mg/dLGLUCOSE 102.0 mg/dLSODIUM 143.0 
mmol/LPOTASSIUM 3.70 mmol/LCHLORIDE 111.0 mmol/LCO2 23.0 mmol/LBUN 10.0 
mg/dLCREATININE 0.80 mg/dLSGOT/AST 12.0 IU/LSGPT/ALT 11.0 IU/LALK PHOS 65.0 
IU/LTOTAL PROTEIN 7.20 g/dLALBUMIN 3.90 g/dLTOTAL BILI 0.50 mg/dLCALCIUM 10.20 
mg/dLAGE 59 GFR NonAA 73 GFR AA 88 eGFR >60 mL/min/1.73 m2eGFR AA* >60 WBC 5.7 
RBC 3.26 HGB 10.60 g/dLHCT 31.70 %MCV 97.0 fLMCH 32.50 pgMCHC 33.40 g/dLRDW SD 
47 RDW CV 13.30 %MPV 9.70 fLPLT 287 NRBC# 0.00 NRBC% 0.0 %NEUT 62.90 %%LYMP 
21.80 %%MONO 9.90 %%EOS 5.0 %%BASO 0.40 %#NEUT 3.56 #LYMP 1.23 #MONO 0.56 #EOS 
0.28 #BASO 0.02 MANUAL DIFF NOT IND 

 

                          2010 12:00 AM        Glucose SerPl-mCnc 96.0 mg/dLCholest Cry Stone Ql .0 %LDLc

 SerPl-mCnc 146.0 mg/dL

 

                          2010 8:26 AM         TRIGLYCERIDES 106.0 mg/dLCHOLESTEROL 199.0 mg/dLHDL 32.0 mg/dLTOT

 CHOL/HDL 6.2 LDL (CALC) 146.0 mg/dLGLUCOSE 96.0 mg/dLSODIUM 143.0 
mmol/LPOTASSIUM 4.0 mmol/LCHLORIDE 113.0 mmol/LCO2 24.0 mmol/LBUN 13.0 
mg/dLCREATININE 1.0 mg/dLSGOT/AST 11.0 IU/LSGPT/ALT 6.0 IU/LALK PHOS 56.0 
IU/LTOTAL PROTEIN 6.60 g/dLALBUMIN 3.80 g/dLTOTAL BILI 0.50 mg/dLCALCIUM 9.30 
mg/dLAGE 59 GFR NonAA 57 GFR AA 69 eGFR 57 eGFR AA* >60 

 

                          10/6/2010 12:00 AM        Cholest Cry Stone Ql .0 %LDLc SerPl-mCnc 111.0 mg/dLGlucose

 SerPl-mCnc 81.0 mg/dL

 

                          10/6/2010 2:45 PM         TRIGLYCERIDES 123.0 mg/dLCHOLESTEROL 178.0 mg/dLHDL 42.0 mg/dLTOT

 CHOL/HDL 4.2 LDL (CALC) 111.0 mg/dLGLUCOSE 81.0 mg/dLSODIUM 139.0 
mmol/LPOTASSIUM 4.10 mmol/LCHLORIDE 106.0 mmol/LCO2 24.0 mmol/LBUN 13.0 
mg/dLCREATININE 0.90 mg/dLSGOT/AST 13.0 IU/LSGPT/ALT 11.0 IU/LALK PHOS 61.0 
IU/LTOTAL PROTEIN 7.10 g/dLALBUMIN 3.90 g/dLTOTAL BILI 0.30 mg/dLCALCIUM 9.30 
mg/dLAGE 60 GFR NonAA 64 GFR AA 78 eGFR >60 mL/min/1.73 m2eGFR AA* >60 WBC 6.9 
RBC 3.59 HGB 11.50 g/dLHCT 35.30 %MCV 98.0 fLMCH 32.0 pgMCHC 32.60 g/dLRDW SD 46
 RDW CV 12.90 %MPV 9.90 fLPLT 311 NRBC# 0.00 NRBC% 0.0 %NEUT 64.90 %%LYMP 22.50 
%%MONO 7.20 %%EOS 5.10 %%BASO 0.30 %#NEUT 4.45 #LYMP 1.54 #MONO 0.49 #EOS 0.35 
#BASO 0.02 MANUAL DIFF NOT IND 

 

                          2011 12:00 AM         Mammogram -Women over 40 Ordered 

 

                          2011 10:25 AM        TRIGLYCERIDES 111.0 mg/dLCHOLESTEROL 195.0 mg/dLHDL 43.0 mg/dLTOT

 CHOL/HDL 4.5 LDL (CALC) 130.0 mg/dLWBC 5.3 RBC 3.76 HGB 12.0 g/dLHCT 37.80 %MCV
 101.0 fLMCH 31.90 pgMCHC 31.70 g/dLRDW SD 47 RDW CV 13.0 %MPV 9.70 fLPLT 259 
NRBC# 0.00 NRBC% 0.0 %NEUT 69.0 %%LYMP 17.60 %%MONO 8.30 %%EOS 4.70 %%BASO 0.40 
%#NEUT 3.63 #LYMP 0.93 #MONO 0.44 #EOS 0.25 #BASO 0.02 MANUAL DIFF NOT IND 
GLUCOSE 102.0 mg/dLSODIUM 146.0 mmol/LPOTASSIUM 4.20 mmol/LCHLORIDE 113.0 
mmol/LCO2 23.0 mmol/LBUN 15.0 mg/dLCREATININE 1.0 mg/dLSGOT/AST 12.0 
IU/LSGPT/ALT 17.0 IU/LALK PHOS 60.0 IU/LTOTAL PROTEIN 6.90 g/dLALBUMIN 4.20 
g/dLTOTAL BILI 0.40 mg/dLCALCIUM 9.70 mg/dLAGE 60 GFR NonAA 57 GFR AA 69 eGFR 57
 eGFR AA* >60 

 

                          2011 11:49 AM        Cholest Cry Stone Ql .0 %LDLc SerPl-mCnc 130.0 mg/dLHDLc

 SerPl-mCnc 43.0 mg/dLTrigl SerPl-mCnc 111.0 mg/dLGlucose SerPl-mCnc 102.0 mg/dL

 

                          2011 11:52 AM        Pap Smear Declined 

 

                          2011 11:28 AM        Lithium 2.080 mmol/LGLUCOSE 102.0 mg/dLSODIUM 135.0 mmol/LPOTASSIUM

 3.90 mmol/LCHLORIDE 106.0 mmol/LCO2 21.0 mmol/LBUN 12.0 mg/dLCREATININE 1.30 
mg/dLCALCIUM 10.70 mg/dLAGE 60 GFR NonAA 42 GFR AA 51 eGFR 42 eGFR AA* 51 

 

                          2011 8:58 AM          Lithium 0.690 mmol/L

 

                          2011 2:38 PM         VITAMIN B12 3483.0 pg/mL

 

                          2013 3:35 PM          WBC 5.1 RBC 3.73 HGB 11.70 g/dLHCT 36.40 %MCV 98.0 fLMCH 31.40

 pgMCHC 32.10 g/dLRDW SD 47 RDW CV 13.10 %MPV 9.80 fLPLT 224 NRBC# 0.00 NRBC% 
0.0 %NEUT 66.80 %%LYMP 19.10 %%MONO 9.0 %%EOS 4.90 %%BASO 0.20 %#NEUT 3.42 #LYMP
 0.98 #MONO 0.46 #EOS 0.25 #BASO 0.01 MANUAL DIFF NOT IND GLUCOSE 88.0 
mg/dLSODIUM 141.0 mmol/LPOTASSIUM 4.10 mmol/LCHLORIDE 110.0 mmol/LCO2 22.0 
mmol/LBUN 22.0 mg/dLCREATININE 1.10 mg/dLCALCIUM 9.80 mg/dLAGE 62 GFR NonAA 50 
GFR AA 61 eGFR 50 eGFR AA* 60 Lithium 0.760 mmol/L

 

                          2013 11:02 AM        TRIGLYCERIDES 106.0 mg/dLCHOLESTEROL 181.0 mg/dLHDL 46.0 mg/dLTOT

 CHOL/HDL 3.9 LDL (CALC) 114.0 mg/dLVITAMIN D 41.10 ng/mL

 

                          10/17/2014 10:10 AM       WBC 5.0 RBC 3.66 HGB 11.60 g/dLHCT 36.80 %.0 fLMCH 31.70

 pgMCHC 31.50 g/dLRDW SD 50 RDW CV 13.50 %MPV 10.10 fLPLT 209 NRBC# 0.00 NRBC% 
0.0 %NEUT 69.20 %%LYMP 21.0 %%MONO 6.40 %%EOS 3.20 %%BASO 0.20 %#NEUT 3.46 #LYMP
 1.05 #MONO 0.32 #EOS 0.16 #BASO 0.01 MANUAL DIFF NOT IND GLUCOSE 100.0 
mg/dLSODIUM 148.0 mmol/LPOTASSIUM 3.90 mmol/LCHLORIDE 114.0 mmol/LCO2 26.0 
mmol/LBUN 12.0 mg/dLCREATININE 1.20 mg/dLSGOT/AST 9.0 IU/LSGPT/ALT <6 IU/LALK 
PHOS 82.0 IU/LTOTAL PROTEIN 6.90 g/dLALBUMIN 4.0 g/dLTOTAL BILI 0.40 
mg/dLCALCIUM 10.50 mg/dLAGE 64 GFR NonAA 45 GFR AA 55 eGFR 45 eGFR AA* 55 
TRIGLYCERIDES 96.0 mg/dLCHOLESTEROL 155.0 mg/dLHDL 38.0 mg/dLTOT CHOL/HDL 4.1 
LDL (CALC) 98.0 mg/dLLithium 0.850 mmol/LCancer Antigen (CA) 125 8.30 U/mL

 

                          2015 10:25 AM        Lithium 0.790 mmol/LWBC 4.8 RBC 3.44 HGB 11.0 g/dLHCT 35.20 

%.0 fLMCH 32.0 pgMCHC 31.30 g/dLRDW SD 53 RDW CV 14.0 %MPV 9.30 fLPLT 210
 NRBC# 0.00 NRBC% 0.0 %NEUT 70.80 %%LYMP 17.20 %%MONO 8.10 %%EOS 3.50 %%BASO 
0.40 %#NEUT 3.41 #LYMP 0.83 #MONO 0.39 #EOS 0.17 #BASO 0.02 MANUAL DIFF NOT IND 
TRIGLYCERIDES 107.0 mg/dLCHOLESTEROL 174.0 mg/dLHDL 43.0 mg/dLTOT CHOL/HDL 4.0 
LDL (CALC) 110.0 mg/dLGLUCOSE 90.0 mg/dLSODIUM 145.0 mmol/LPOTASSIUM 3.80 
mmol/LCHLORIDE 115.0 mmol/LCO2 24.0 mmol/LBUN 17.0 mg/dLCREATININE 1.30 
mg/dLSGOT/AST 18.0 IU/LSGPT/ALT 17.0 IU/LALK PHOS 56.0 IU/LTOTAL PROTEIN 6.70 
g/dLALBUMIN 3.90 g/dLTOTAL BILI 0.40 mg/dLCALCIUM 9.80 mg/dLAGE 64 GFR NonAA 41 
GFR AA 50 eGFR 41 eGFR AA* 50 

 

                          2015 8:50 AM        WBC 5.8 RBC 3.29 HGB 10.70 g/dLHCT 34.0 %.0 fLMCH 32.50

 pgMCHC 31.50 g/dLRDW SD 52 RDW CV 13.60 %MPV 9.60 fLPLT 223 NRBC# 0.00 NRBC% 
0.0 %NEUT 69.60 %%LYMP 18.90 %%MONO 8.50 %%EOS 2.80 %%BASO 0.20 %#NEUT 4.03 
#LYMP 1.09 #MONO 0.49 #EOS 0.16 #BASO 0.01 MANUAL DIFF NOT IND Lithium 0.620 
mmol/LGLUCOSE 83.0 mg/dLSODIUM 139.0 mmol/LPOTASSIUM 3.90 mmol/LCHLORIDE 109.0 
mmol/LCO2 22.0 mmol/LBUN 19.0 mg/dLCREATININE 1.40 mg/dLSGOT/AST 19.0 
IU/LSGPT/ALT 21.0 IU/LALK PHOS 55.0 IU/LTOTAL PROTEIN 6.50 g/dLALBUMIN 3.90 
g/dLTOTAL BILI 0.50 mg/dLCALCIUM 9.60 mg/dLAGE 65 GFR NonAA 38 GFR AA 46 eGFR 38
 eGFR AA* 46 TRIGLYCERIDES 121.0 mg/dLCHOLESTEROL 192.0 mg/dLHDL 51.0 mg/dLTOT 
CHOL/HDL 3.8 .0 mg/dLFREE T4 0.79 TSH 1.210 uIU/mLHemoglobin A1c 5.40 
%Estim. Avg Glu (eAG) 108 

 

                          3/15/2016 8:08 AM         VITAMIN B12 696.0 pg/mL

 

                          3/23/2016 8:26 AM         WBC 7.0 RBC 3.61 HGB 11.80 g/dLHCT 37.70 %.0 fLH 32.70

 pgMCHC 31.30 g/dLRDW SD 49 RDW CV 12.50 %MPV 10.0 fLPLT 207 NRBC# 0.00 NRBC% 
0.0 %NEUT 73.60 %%LYMP 16.40 %%MONO 6.60 %%EOS 3.0 %%BASO 0.30 %#NEUT 5.15 #LYMP
 1.15 #MONO 0.46 #EOS 0.21 #BASO 0.02 MANUAL DIFF NOT IND Lithium 0.940 
mmol/LGLUCOSE 108.0 mg/dLSODIUM 143.0 mmol/LPOTASSIUM 4.30 mmol/LCHLORIDE 110.0 
mmol/LCO2 27.0 mmol/LBUN 16.0 mg/dLCREATININE 1.60 mg/dLSGOT/AST 13.0 
IU/LSGPT/ALT 7.0 IU/LALK PHOS 71.0 IU/LTOTAL PROTEIN 6.80 g/dLALBUMIN 4.0 
g/dLTOTAL BILI 0.20 mg/dLCALCIUM 10.40 mg/dLAGE 65 GFR NonAA 32 GFR AA 39 eGFR 
32 eGFR AA* 39 TRIGLYCERIDES 113.0 mg/dLCHOLESTEROL 169.0 mg/dLHDL 42.0 mg/dLTOT
 CHOL/HDL 4.0 LDL (CALC) 104.0 mg/dLFREE T4 0.86 TSH 2.20 uIU/mLHemoglobin A1c 
5.20 %Estim. Avg Glu (eAG) 103 

 

                          3/25/2016 9:17 AM         COLOR YELLOW APPEARANCE CLEAR SPEC GRAV 1.010 pH 7.0 PROTEIN 

NEGATIVE GLUCOSE NEGATIVE mg/dLKETONE NEGATIVE BILIRUBIN NEGATIVE BLOOD NEGATIVE
 NITRITE NEGATIVE [place] SCREEN SMALL MICRO IND? SEE BELOW WBC/HPF 0-5 RBC/HPF 
NEGATIVE CASTS/LPF NEGATIVE /LPFCRYSTALS NEGATIVE MUCOUS THRDS NEGATIVE BACTERIA
 NEGATIVE EPITH CELLS FEW SQUAMOUS /HPFTRICHOMONAS NEGATIVE YEAST NEGATIVE 

 

                          2016 6:00 AM        GLUCOSE 91.0 mg/dLSODIUM 143.0 mmol/LPOTASSIUM 3.60 mmol/LCHLORIDE

 112.0 mmol/LCO2 23.0 mmol/LBUN 22.0 mg/dLCREATININE 1.20 mg/dLSGOT/AST 15.0 
IU/LSGPT/ALT 12.0 IU/LALK PHOS 61.0 IU/LTOTAL PROTEIN 5.40 g/dLALBUMIN 3.10 
g/dLTOTAL BILI 0.40 mg/dLCALCIUM 8.40 mg/dLAGE 66 GFR NonAA 45 GFR AA 55 eGFR 45
 eGFR AA* 55 WBC 3.0 RBC 3.05 HGB 9.80 g/dLHCT 32.10 %.0 fLMCH 32.10 
pgMCHC 30.50 g/dLRDW SD 54 RDW CV 14.20 %MPV 10.10 fLPLT 170 NRBC# 0.00 NRBC% 
0.0 %NEUT 50.70 %%LYMP 32.60 %%MONO 10.50 %%EOS 5.90 %%BASO 0.30 %#NEUT 1.54 
#LYMP 0.99 #MONO 0.32 #EOS 0.18 #BASO 0.01 MANUAL DIFF NOT IND 

 

                          2016 2:09 PM        COLOR YELLOW APPEARANCE CLEAR SPEC GRAV 1.010 pH 5.0 PROTEIN

 30 GLUCOSE NEGATIVE mg/dLKETONE NEGATIVE BILIRUBIN NEGATIVE BLOOD LARGE NITRITE
 NEGATIVE [place] SCREEN MODERATE MICRO INDICATED? SEE BELOW WBC/HPF  RBC/HPF
 20-50 CASTS/LPF NEGATIVE /LPFCRYSTALS NEGATIVE MUCOUS THRDS NEGATIVE BACTERIA 
NEGATIVE EPITH CELLS FEW SQUAMOUS /HPFTRICHOMONAS NEGATIVE YEAST NEGATIVE CULT 
SET UP? YES 

 

                          1/3/2017 4:08 PM          COLOR YELLOW APPEARANCE HAZY SPEC GRAV 1.015 pH 6.0 PROTEIN 30

 GLUCOSE NEGATIVE mg/dLKETONE NEGATIVE BILIRUBIN NEGATIVE BLOOD MODERATE NITRITE
 NEGATIVE [place] SCREEN LARGE MICRO INDICATED? SEE BELOW WBC/-200 RBC/HPF 
5-10 CASTS/LPF NEGATIVE /LPFCRYSTALS NEGATIVE MUCOUS THRDS NEGATIVE BACTERIA 
NEGATIVE EPITH CELLS 1+ SQUAMOUS /HPFTRICHOMONAS NEGATIVE YEAST NEGATIVE CULT 
SET UP? YES 

 

                          2017 4:24 PM         CREATININE 1.50 mg/dLAGE 66 GFR NonAA 35 GFR AA 42 eGFR 35 eGFR

 AA* 42 VITAMIN B12 1324.0 pg/mL

 

                          2017 11:30 AM         GLUCOSE 93.0 mg/dLSODIUM 143.0 mmol/LPOTASSIUM 3.10 mmol/LCHLORIDE

 101.0 mmol/LCO2 29.0 mmol/LBUN 26.0 mg/dLCREATININE 1.50 mg/dLSGOT/AST 23.0 
IU/LSGPT/ALT 13.0 IU/LALK PHOS 66.0 IU/LTOTAL PROTEIN 7.70 g/dLALBUMIN 4.30 
g/dLTOTAL BILI 0.40 mg/dLCALCIUM 10.30 mg/dLAGE 66 GFR NonAA 35 GFR AA 42 eGFR 
35 eGFR AA* 42 TRIGLYCERIDES 147.0 mg/dLCHOLESTEROL 184.0 mg/dLHDL 44.0 mg/dLTOT
 CHOL/HDL 4.2 .0 mg/dLWBC 5.4 RBC 3.98 HGB 12.90 g/dLHCT 40.20 %.0
 fLMCH 32.40 pgMCHC 32.10 g/dLRDW SD 50 RDW CV 13.50 %MPV 9.30 fLPLT 210 NRBC# 
0.00 NRBC% 0.0 %NEUT 54.20 %%LYMP 30.70 %%MONO 9.10 %%EOS 5.20 %%BASO 0.40 
%#NEUT 2.94 #LYMP 1.66 #MONO 0.49 #EOS 0.28 #BASO 0.02 MANUAL DIFF NOT IND FREE 
T4 1.09 COLOR YELLOW APPEARANCE CLEAR SPEC GRAV <=1.005 pH 5.5 PROTEIN NEGATIVE 
GLUCOSE NEGATIVE mg/dLKETONE NEGATIVE BILIRUBIN NEGATIVE BLOOD NEGATIVE NITRITE 
NEGATIVE LEUK SCREEN LARGE MICRO INDICATED? SEE BELOW WBC/HPF 10-20 RBC/HPF 0-5 
CASTS/LPF NEGATIVE /LPFCRYSTALS NEGATIVE MUCOUS THRDS NEGATIVE BACTERIA FEW 
EPITH CELLS 1+ SQUAMOUS /HPFTRICHOMONAS NEGATIVE YEAST NEGATIVE CULT SET UP? YES
 TSH 1.820 uIU/mL

 

                          2017 2:45 PM         COLOR YELLOW APPEARANCE CLEAR SPEC GRAV <=1.005 pH 6.0 PROTEIN

 NEGATIVE GLUCOSE NEGATIVE mg/dLKETONE NEGATIVE BILIRUBIN NEGATIVE BLOOD TRACE-
INTACT NITRITE NEGATIVE LEUK SCREEN SMALL MICRO INDICATED? SEE BELOW WBC/HPF 0-5
 RBC/HPF NEGATIVE CASTS/LPF NEGATIVE /LPFCRYSTALS NEGATIVE MUCOUS THRDS NEGATIVE
 BACTERIA FEW EPITH CELLS FEW SQUAMOUS /HPFTRICHOMONAS NEGATIVE YEAST NEGATIVE 
CULT SET UP? YES 

 

                          2017 11:22 AM        COLOR YELLOW APPEARANCE CLEAR SPEC GRAV <=1.005 pH 6.5 PROTEIN

 NEGATIVE GLUCOSE NEGATIVE mg/dLKETONE NEGATIVE BILIRUBIN NEGATIVE BLOOD 
NEGATIVE NITRITE NEGATIVE LEUK SCREEN NEGATIVE MICRO INDICATED? NOT INDICATED 

 

                          2017 2:18 PM         Clarity Ur cloudy Color Ur dark yellow Glucose Ur-sCnc negative

 Bilirub Ur Ql Strip negative Ketones Ur Ql Strip negative Sp Gr Ur Qn 1.010 Hgb
 Ur Ql Strip trace-intact pH Ur-LsCnc 6.5 Prot Ur Ql Strip trace Urobilinogen 
Ur-mCnc 0.2 Nitrite Ur Ql Strip negative WBC Est Ur Ql Strip large 

 

                          2017 9:28 AM         GLUCOSE 109.0 mg/dLSODIUM 142.0 mmol/LPOTASSIUM 3.60 mmol/LCHLORIDE

 106.0 mmol/LCO2 23.0 mmol/LBUN 11.0 mg/dLCREATININE 1.30 mg/dLCALCIUM 9.80 
mg/dLAGE 66 GFR NonAA 41 GFR AA 50 eGFR 41 eGFR AA* 50 

 

                          2017 9:52 AM         COLOR YELLOW APPEARANCE CLOUDY SPEC GRAV <=1.005 pH 6.5 PROTEIN

 NEGATIVE GLUCOSE NEGATIVE mg/dLKETONE NEGATIVE BILIRUBIN NEGATIVE BLOOD SMALL 
NITRITE POSITIVE LEUK SCREEN LARGE MICRO INDICATED? SEE BELOW WBC/-300 
RBC/HPF 5-10 CASTS/LPF NEGATIVE /LPFCRYSTALS NEGATIVE MUCOUS THRDS NEGATIVE 
BACTERIA 2++ EPITH CELLS 1+ SQUAMOUS /HPFTRICHOMONAS NEGATIVE YEAST NEGATIVE 
CULT SET UP? YES 

 

                          2017 10:41 AM         COLOR YELLOW APPEARANCE CLEAR SPEC GRAV <=1.005 pH 6.0 PROTEIN

 NEGATIVE GLUCOSE NEGATIVE mg/dLKETONE NEGATIVE BILIRUBIN NEGATIVE BLOOD 
NEGATIVE NITRITE NEGATIVE [facility]UK SCREEN TRACE MICRO INDICATED? SEE BELOW WBC/HPF 
0-5 RBC/HPF RARE CASTS/LPF NEGATIVE /LPFCRYSTALS NEGATIVE MUCOUS THRDS NEGATIVE 
BACTERIA FEW EPITH CELLS 1+ SQUAMOUS /HPFTRICHOMONAS NEGATIVE YEAST NEGATIVE 
CULT SET UP? NO 







History Of Immunizations







       Name    Date Admin    Mfg Name    Mfg Code    Trade Name    Lot#    Route    Inj    Vis Given    Vis

 Pub                                    CVX

 

        Influenza    2008    Not Entered    NE      Not Entered            Not Entered    Not Entered

                    1            999

 

        X       12/19/2008    [employer]    MSD     Pneumovax 23            Intramuscular    Not Entered

                    1            999

 

           Influenza    10/15/2010    [employer] [facility]    NOV        Fluvirin > 12 Years    

[id number]     Intramuscular    Left Deltoid    10/15/2010    2009    999

 

          X         3/23/2015    [facility]    [facility]       Prevnar 13    [id number]    Intramuscular

                Right Gluteous Medius    3/23/2015       2013       109







History of Past Illness







                    Name                Date of Onset       Comments

 

                    Peritoneal Neoplasm, Malignant                         

 

                    Hyperlipidemia                           

 

                    Bipolar disorder, unspecified                         

 

                    Artificial opening status; colostomy                         

 

                    B12 deficiency                           

 

                    Anemia, Pernicious                         

 

                    Arthritis unspecified                         

 

                    cervical cancer                          

 

                    Artificial opening status; colostomy    2010  1:10PM     

 

                    Bipolar disorder, unspecified    2010  1:10PM     

 

                    Hyperlipidemia      2010  1:10PM     

 

                    Anemia, Pernicious    2010  1:10PM     

 

                    Postoperative Follow-Up    2010  1:55PM     

 

                    Postoperative Follow-Up    Mar  8 2010 10:57AM     

 

                    Artificial opening status; colostomy    Mar  8 2010  1:19PM     

 

                    Peritoneal Neoplasm, Malignant    Mar  8 2010  1:19PM     

 

                    Artificial opening status; colostomy    2010  1:40PM     

 

                    Hyperlipidemia      2010  1:40PM     

 

                    Anemia, Pernicious    2010  1:40PM     

 

                    Peritoneal Neoplasm, Malignant    2010  1:40PM     

 

                    Arthritis unspecified    2010  1:40PM     

 

                    Anemia of Chronic Illness    2010  1:40PM     

 

                    Tinea corporis      2010  3:17PM     

 

                    Bipolar disorder, unspecified    2010  1:33PM     

 

                    Hyperlipidemia      2010  1:33PM     

 

                    Anemia, Pernicious    2010  1:33PM     

 

                    Peritoneal Neoplasm, Malignant    2010  1:33PM     

 

                    B12 deficiency      2010  1:33PM     

 

                    Ethmoidal Sinusitis, Acute    Sep 21 2010  3:53PM     

 

                    Wheezing            Sep 21 2010  3:53PM     

 

                    Flu                 Oct 15 2010  1:40PM     

 

                    Bipolar disorder, unspecified    Oct 15 2010  1:42PM     

 

                    Hyperlipidemia      Oct 15 2010  1:42PM     

 

                    Anemia, Pernicious    Oct 15 2010  1:42PM     

 

                    Peritoneal Neoplasm, Malignant    Oct 15 2010  1:42PM     

 

                    Bipolar disorder, unspecified    2011 12:01PM     

 

                    Hyperlipidemia      2011 12:01PM     

 

                    Anemia, Pernicious    2011 12:01PM     

 

                    Peritoneal Neoplasm, Malignant    2011 12:01PM     

 

                    Bipolar disorder, unspecified    Apr 15 2011 10:55AM     

 

                    Major Depression    2011 10:11AM     

 

                    Bipolar Disorder    2011 10:11AM     

 

                    Cancer              May 10 2011  4:16PM     

 

                    Major Depression    May 10 2011  3:16PM     

 

                    Bipolar Disorder    May 10 2011  3:16PM     

 

                    Hypercalcemia       May 23 2011  2:47PM     

 

                    Bipolar disorder, unspecified    May 23 2011  2:47PM     

 

                    Colon Cancer, Personal History    May 23 2011  2:47PM     

 

                    Bipolar Disorder    May 31 2011  4:39PM     

 

                    Depressive Disorder    2011 10:01AM     

 

                    Vitamin B12 deficiency    2011 10:01AM     

 

                    Vitamin D Deficiency    2011  5:07PM     

 

                    Anemia, Vitamin B12 Deficiency    2011  5:07PM     

 

                    B12 deficiency      2011  3:56PM     

 

                    Routine gynecological examination    Aug  4 2011  9:08AM     

 

                    Screening Examination for Breast Cancer    Aug  4 2011  9:08AM     

 

                    Tinea Corporis      Aug  4 2011  9:08AM     

 

                    Depressive Disorder    Sep 23 2011  8:47AM     

 

                    Contact Dermatitis    Sep 23 2011  8:47AM     

 

                    Anemia, Pernicious    Sep 23 2011  8:47AM     

 

                    B12 deficiency      Sep 23 2011  8:47AM     

 

                    B12 deficiency      Sep 27 2011  2:58PM     

 

                    B12 deficiency      Oct 20 2011  2:34PM     

 

                    Flu                 Dec  9 2011  3:16PM     

 

                    B12 deficiency      Dec  9 2011  3:17PM     

 

                    B12 deficiency      2012  4:52PM     

 

                    B12 deficiency      2012 11:10AM     

 

                    B12 deficiency      2012  3:37PM     

 

                    B12 deficiency      May  3 2012  4:10PM     

 

                    B12 deficiency      2012  2:54PM     

 

                    B12 deficiency      2012 11:23AM     

 

                    B12 deficiency      Aug  9 2012  2:08PM     

 

                    B12 deficiency      Sep  6 2012  4:36PM     

 

                    B12 deficiency      Oct 16 2012 10:23AM     

 

                    Flu                 Feb  2013  3:11PM     

 

                    Bipolar disorder, unspecified    Feb  2013  2:48PM     

 

                    Anemia, Pernicious    Feb  2013  2:48PM     

 

                    B12 deficiency      Feb  2013  2:48PM     

 

                    Extrapyramidal abnormal movement disorder    Feb  2013  2:48PM     

 

                    B12 deficiency      Apr  3 2013 12:03PM     

 

                    Bipolar disorder, unspecified    May  7 2013  1:31PM     

 

                    Anemia, Pernicious    May  7 2013  1:31PM     

 

                    B12 deficiency      May  7 2013  1:31PM     

 

                    Extrapyramidal abnormal movement disorder    May  7 2013  1:31PM     

 

                    B12 deficiency      2013  3:42PM     

 

                    B12 deficiency      2013  1:31PM     

 

                    Hyperlipidemia      Aug  7 2013 10:37AM     

 

                    Vitamin D Deficiency    Aug  7 2013 10:37AM     

 

                    Bipolar disorder, unspecified    Aug  7 2013 10:37AM     

 

                    Anemia, Pernicious    Aug  7 2013 10:37AM     

 

                    B12 deficiency      Aug  7 2013 10:37AM     

 

                    B12 deficiency      Sep 25 2013 11:15AM     

 

                    B12 deficiency      Dec 11 2013  3:16PM     

 

                    B12 deficiency      Mar  6 2014  1:48PM     

 

                    B12 deficiency      May 21 2014  3:17PM     

 

                    Screening Examination for Breast Cancer    2014  3:23PM     

 

                    Periumbilical abdominal pain    2014  3:23PM     

 

                    B12 deficiency      Jul 10 2014  2:52PM     

 

                    Anemia, Vitamin B12 Deficiency    Aug 13 2014  4:50PM     

 

                    Bipolar disorder    Oct 16 2014 11:13AM     

 

                    Hyperlipidemia      Oct 16 2014 11:13AM     

 

                    Anemia, Pernicious    Oct 16 2014 11:13AM     

 

                    Peritoneal Neoplasm, Malignant    Oct 16 2014 11:13AM     

 

                    Screening breast examination    Oct 16 2014 11:13AM     

 

                    Weight loss         Oct 16 2014 11:13AM     

 

                    Anemia, Pernicious    Mar 23 2015  2:57PM     

 

                    B12 deficiency      Mar 23 2015  2:57PM     

 

                    Need for Prevnar vaccine    Mar 23 2015  2:57PM     

 

                    Bipolar disorder    Mar 23 2015  2:57PM     

 

                    Hyperlipidemia      Mar 23 2015  2:57PM     

 

                    Anemia, Pernicious    Mar 23 2015  2:57PM     

 

                    Peritoneal Neoplasm, Malignant    Mar 23 2015  2:57PM     

 

                    B12 deficiency      May  4 2015  4:48PM     

 

                    Hyperlipidemia      May 13 2015  9:56AM     

 

                    Anemia              May 13 2015  9:56AM     

 

                    Bipolar disorder    May 13 2015  9:56AM     

 

                    Bipolar disorder    May 14 2015  3:27PM     

 

                    Hyperlipidemia      May 14 2015  3:27PM     

 

                    Anemia, Pernicious    May 14 2015  3:27PM     

 

                    Peritoneal Neoplasm, Malignant    May 14 2015  3:27PM     

 

                    B12 deficiency      2015  2:20PM     

 

                    B12 deficiency      2015 11:34AM     

 

                    B12 deficiency      Aug 18 2015  9:06AM     

 

                    Tinea Corporis      Sep 18 2015  8:54AM     

 

                    B12 deficiency      Sep 18 2015  8:54AM     

 

                    B12 deficiency      2015 10:28AM     

 

                    Herpes zoster without complication    Dec  3 2015  9:52AM     

 

                    B12 deficiency      Dec 23 2015 11:21AM     

 

                    B12 deficiency      2016  4:51PM     

 

                    Vitamin B 12 deficiency    Mar 14 2016  5:35PM     

 

                    B12 deficiency      Mar 15 2016 12:14PM     

 

                    B12 deficiency      May  5 2016 11:30AM     

 

                    Edema               May  5 2016 11:30AM     

 

                    Dermatitis          May  5 2016 11:30AM     

 

                    Edema               May 17 2016  8:38AM     

 

                    Shortness of breath    May 17 2016  8:38AM     

 

                    Bilateral edema of lower extremity    2016  2:06PM     

 

                    B12 deficiency      2016  2:06PM     

 

                    B12 deficiency      2016 11:50AM     

 

                    B12 deficiency      2016 11:20AM     

 

                    Diarrhea            Aug  2 2016  3:13PM     

 

                    B12 deficiency      Aug 24 2016 11:10AM     

 

                    Encounter for screening mammogram for breast cancer    Aug 24 2016 11:44AM     

 

                    B12 deficiency      Sep 28 2016  2:35PM     

 

                    B12 deficiency      Dec 15 2016  2:02PM     

 

                    Dysuria             Dec 29 2016 12:14PM     

 

                    Hematuria           Fidencio  3 2017  1:33PM     

 

                    Constipation by delayed colonic transit    2017  1:52PM     

 

                    Ileus               2017  1:52PM     

 

                    UTI (urinary tract infection)    Fidencio 15 2017  3:39PM     

 

                    Acute cystitis with hematuria    2017 11:07AM     

 

                    B12 deficiency      2017 11:07AM     

 

                    B12 deficiency      2017 11:40AM     

 

                    B12 deficiency      2017  4:07PM     

 

                    Slurred speech      2017  3:07PM     

 

                    Vitamin B12 deficiency    2017  3:07PM     

 

                    Dysphagia, unspecified type    2017  3:07PM     

 

                    Hyperlipidemia      2017  3:07PM     

 

                    Dysuria             2017 12:01PM     

 

                    B12 deficiency      2017  2:08PM     

 

                    Dysuria             2017 10:58AM     

 

                    Hematuria           May 22 2017  1:36PM     

 

                    Depressive Disorder    May 22 2017  1:36PM     

 

                    Constipation        May 22 2017  1:36PM     

 

                    Fatigue             May 22 2017  1:36PM     

 

                    Urinary tract infection    May 30 2017  9:36AM     

 

                    Hypokalemia         May 30 2017 12:03PM     

 

                    Urinary tract infection    2017  4:36PM     

 

                    Bipolar disorder, unspecified    Aug 23 2017 11:05AM     

 

                    Anemia, Pernicious    Aug 23 2017 11:05AM     

 

                    B12 deficiency      Aug 23 2017 11:05AM     







Payers







           Insurance Name    Company Name    Plan Name    Plan Number    Policy Number    Policy Group

 Number                                 Start Date

 

                    Medicare RHC    Medicare RHC              [id number]              N/A

 

                          [employer]    [employer]                 [id number]

                                                    

 

                    Medicare Part A    Medicare - Lab/Xray              [id number]              2006

 

                    Medicare Part B    Medicare [place]              [id number]              2006

 

                          [employer]    [employer]                 50 percent

                                                    2009

 

                    [facility]    [employer]              [id number]              N/A

 

                    Medicare Part A    Medicare Part A              [id number]              N/A

 

                    Medicare Part A    Medicare Part A              [id number]              2006









History of Encounters







                    Visit Date          Visit Type          Provider

 

                    2017           [facility]            Pranav Angel MD

 

                    2017           Office visit        Bhupinder Louise DO

 

                    2017           Nurse visit         Bhupinder Louise DO

 

                    2017           Office visit         

 

                    2017           Office visit        Bhupinder Louise DO

 

                    2017           Nurse visit         Bhupinder Louise DO

 

                    2017           Office visit        Radha Ontiveros APRN

 

                    1/15/2017           Office visit        Aj Tapia NP

 

                    2017            Office visit        Devin Masterson MD

 

                    2016          [facility]            Devin Masterson MD

 

                    12/15/2016          Nurse visit         Bhupinder Louise DO

 

                    2016           Nurse visit         Bret Forte APRN

 

                    2016           Nurse visit         Bhupinder Louise DO

 

                    2016            Office visit        Bhupinder Louise DO

 

                    2016           Nurse visit         Bhupinder Louise DO

 

                    2016           Office visit        Bret GREEN

 

                    2016            Office visit        Bhupinder Louise DO

 

                    3/15/2016           Nurse visit         Bhupinder Louise DO

 

                    2016            Nurse visit         Bhupinder Louise DO

 

                    2015          Nurse visit         Bhupinder Louise DO

 

                    12/3/2015           Office visit        Bhupinder Louise DO

 

                    2015          Nurse visit         Bhupinder Aspen DO

 

                    2015           Office visit        Bhupinder Aspen DO

 

                    2015           Nurse visit         Bhupinder Aspen DO

 

                    2015            Nurse visit         Bhupinder Aspen DO

 

                    2015            Nurse visit         Bhupinder Aspen DO

 

                    2015           Office visit        Bhupinder Aspen DO

 

                    2015            Nurse visit         Bhupinder Aspen DO

 

                    3/23/2015           Office visit        Bhupinder Aspen DO

 

                    10/16/2014          Office visit        Bhupinder Aspen DO

 

                    2014           Nurse visit         Radha Weston APRN

 

                    7/10/2014           Nurse visit         Bhupinder Aspen DO

 

                    2014           Office visit        Bhupinder Aspen DO

 

                    2014           Nurse visit         Bhupinder Aspen DO

 

                    3/6/2014            Nurse visit         Bhupinder Aspen DO

 

                    2014            [facility] SANDRO Lopez MD

 

                    2013          Nurse visit         Bhupinder Aspen DO

 

                    2013           Nurse visit         Bhupinder Aspen DO

 

                    2013            Office visit        Bhupinder Aspen DO

 

                    2013            Nurse visit         Bhupinder Aspen DO

 

                    2013            Nurse visit         Bhupinder Aspen DO

 

                    2013            Office visit        Bhupinder Aspen DO

 

                    4/3/2013            Nurse visit         Bhupinder Aspen DO

 

                    2013            Office visit        Bhupinder Aspen DO

 

                    10/16/2012          Nurse visit         Bhupinder Aspen DO

 

                    2012            Nurse visit         Bhupinder Aspen DO

 

                    2012            Voided              Bhupinder Aspen DO

 

                    2012            Nurse visit         Bhupinder Aspen DO

 

                    2012            Nurse visit         Bhupinder Aspen DO

 

                    2012           Nurse visit         Bhupinder Aspen DO

 

                    5/3/2012            Nurse visit         Bhupinder Aspen DO

 

                    2012           Nurse visit         Bhupinder Aspen DO

 

                    2012           Nurse visit         Bhupinder Aspen DO

 

                    2012           Nurse visit         Bhupinder Aspen DO

 

                    2011           Nurse visit         Bhupinder Aspen DO

 

                    10/20/2011          Nurse visit         Bhupinder Aspen DO

 

                    2011           Office visit        Bhupinder Aspen DO

 

                    2011           Nurse visit         Radha Ontiveros APRN

 

                    2011            Office visit        Bhupinder Aspen DO

 

                    2011           Nurse visit         Bhupinder Aspen DO

 

                    2011            Office visit        Bhupinder Aspen DO

 

                    2011           Office visit        Bhupinder Aspen DO

 

                    5/10/2011           Office visit        Bhupinder Louise DO

 

                    2011           Office visit        Bhupinder Louise DO

 

                    4/15/2011           Office visit        Devin Angel DO

 

                    2011           Office visit        Devin Angel DO

 

                    10/15/2010          Office visit        Devin Angel DO

 

                    2010           Office visit        Devin Angel DO

 

                    2010            Office visit        Devin Angel DO

 

                    2010           Office visit        Devin Angel DO

 

                    2010            Office visit        Devin Angel DO

 

                    3/8/2010            Office visit        Devin Masterson MD

 

                    2010            Surgery             Devin Masterson MD

 

                    2010            Office visit        Devin Angel DO

 

                    2010           Surgery             Devin Masterson MD

 

                    2010           Hospital            Devin Masterson MD

 

                    2010           [facility]            Devin Masterson MD

 

                    10/22/2009          Office visit        Devin Angel DO

## 2019-06-26 NOTE — XMS REPORT
MU2 Ambulatory Summary

                             Created on: 2017



Pauline Gan

External Reference #: [id number]

: 1950

Sex: Female



Demographics







                          Address                   [address]

[place]  [id number]

 

                          Home Phone                [phone]

 

                          Preferred Language        English

 

                          Marital Status            Legally 

 

                          Latter day Affiliation     Unknown

 

                          Race                      White

 

                          Ethnic Group              Not  or 





Author







                          Devin Aaron

 

                          [facility]

 

                          Address                   [address]

[place]  [phone]



 

                          Phone                     [phone]







Care Team Providers







                    Care Team Member Name    Role                Phone

 

                    Devin Masterson       PCP                 Unavailable

 

                    Bhupinder Louise    PreferredProvider    Unavailable







Allergies and Adverse Reactions







                    Name                Reaction            Notes

 

                    NO KNOWN DRUG ALLERGIES                         







Plan of Treatment







             Planned Activity    Comments     Planned Date    Planned Time    Plan/Goal

 

             Injection, Subcutaneous/IM                 2016    12:00 AM      

 

             Injection, Subcutaneous/IM                 2016    12:00 AM      

 

             Mammography; bilateral                 2016    12:00 AM      

 

             Injection, Subcutaneous/IM                 2016    12:00 AM      

 

             CBC with Auto                 2013     12:00 AM      

 

             Lithium                   2013     12:00 AM      

 

             Basic metabolic panel                 2013     12:00 AM      

 

             Vitamin B12 (cyanocobalamin)                 2013     12:00 AM      

 

             Folic acid, serum                 2013     12:00 AM      

 

             Injection,Subcutaneous/Intramuscul                 2013    12:00 AM      







Medications







                                        Active 

 

             Name         Start Date    Estimated Completion Date    SIG          Comments

 

                Latuda 20 mg oral tablet                                    take 1 tablet (20 mg) by oral route once daily with

 food (at least 350 calories)            

 

             pravastatin 40 mg oral tablet    3/30/2015                 TAKE 1 TABLET BY MOUTH DAILY     

 

                Namenda XR 28 mg oral capsule,sprinkle,ER 24hr    2015                       take 1 capsule (28

 mg) by oral route once daily            

 

                Namenda XR 28 mg oral capsule,sprinkle,ER 24hr    2016                       take 1 capsule (28

 mg) by oral route once daily            

 

                triamcinolone acetonide 0.1 % topical cream    2016                        apply a thin layer to 

the affected area(s) by topical route 2 times per day     

 

                potassium chloride 10 mEq oral tablet extended release    2016                       take 1 tablet

 (10 meq) by oral route once daily       

 

             pravastatin 40 mg oral tablet    2016                 TAKE 1 TABLET BY MOUTH DAILY     

 

                Vitamin B-12 1,000 mcg/mL injection solution    2016                       inject 1 milliliter 

(1,000 mcg) by intramuscular route once a month     

 

                potassium chloride 10 mEq oral tablet extended release    2016                      take 1 tablet

 (10 meq) by oral route once daily       

 

                Namenda XR 28 mg oral capsule,sprinkle,ER 24hr    2016                      TAKE 1 CAPSULE BY

 MOUTH EVERY DAY                         

 

                furosemide 40 mg oral tablet    2016                      take 1 tablet (40 mg) by oral route

 once daily                              

 

                metoclopramide HCl 10 mg oral tablet    2017       take 1 tablet by oral

 route 2 times a day for 50 days         

 

                Macrobid 100 mg oral capsule    2017       take 1 capsule (100 mg) by oral

 route 2 times per day with food for 7 days     









                                         

 

             Name         Start Date    Expiration Date    SIG          Comments

 

             Reglan 10mg    3/29/2010    2010    one ac and hs     

 

                Keflex 500 mg oral capsule    2010       10/1/2010       take 1 capsule (500 mg) by oral

 route every 6 hours for 10 days         

 

                Bactrim -160 mg oral tablet    2011       take 1 tablet by oral route

 every 12 hours for 7 days               

 

                triamcinolone acetonide 0.1 % topical cream    2011      apply a thin

 layer to the affected area(s) by topical route 2 times per day     

 

                sertraline 100 mg oral tablet    4/10/2012       5/10/2012       take 1.5 tablets by oral route

 daily for 30 days                       

 

                ergocalciferol (vitamin D2) 50,000 unit oral capsule    4/15/2013       2013       TAKE

 ONE CAPSULE BY MOUTH ONCE A WEEK        

 

                CYANOCOBALAM 1000MCGINJ 1000 milliliter    2013       INJECT 1ML INTRAMUSCULAR

 ONCE A MONTH                            

 

                pravastatin 40 mg oral tablet    3/25/2014       3/20/2015       TAKE ONE TABLET BY MOUTH EVERY

 DAY                                     

 

                          Zostavax (PF) 19,400 unit/0.65 mL subcutaneous suspension for reconstitution    3/23/2015

                    3/24/2015           inject 0.65 milliliter by subcutaneous route once     

 

                famciclovir 500 mg oral tablet    12/3/2015       12/10/2015      take 1 tablet (500 mg) by

 oral route every 8 hours for 7 days     

 

                furosemide 40 mg oral tablet    2016      take 1 tablet (40 mg) by oral

 route once daily                        

 

                Cipro 500 mg oral tablet    2016       take 1 tablet (500 mg) by oral route

 2 times per day for 5 days              

 

                Bactrim -160 mg oral tablet    2016        take 1 tablet by oral route

 every 12 hours for 7 days               









                                        Discontinued 

 

             Name         Start Date    Discontinued Date    SIG          Comments

 

                Tylenol 325 mg oral tablet                    2013        take 1 - 2 tablets (325 -650 mg) by oral

 route every 4-6 hours as needed         

 

                Calcium 600 + D(3) 600 mg(1,500mg) -400 unit oral tablet                    2011       take 1 tablet

 by oral route 2 times a day            no longer taking

 

                Vitamin B-12 1,000 mcg oral tablet extended release    2010       take 1

 tablet by oral route daily             no longer taking

 

                Antifungal (clotrimazole) 1 % topical cream    2010       apply to the 

affected and surrounding areas of skin by topical route 2 times per day morning 
and evening                              

 

                sertraline 100 mg oral tablet    5/10/2011       2011       take 2 tablets (200 mg) by 

oral route once daily                   discontinued by Dr. Serrano

 

                mirtazapine 15 mg oral tablet                    2011        take 1 tablet (15 mg) by oral route 

once daily before bedtime               Dr. Serrano

 

                mirtazapine 15 mg oral tablet                    2011        take 1 tablet (15 mg) by oral route 

once daily before bedtime               dc'd by Dr. Serrano

 

                Pristiq 50 mg oral tablet extended release 24 hr                    2013        take 1 tablet (50

 mg) by oral route once daily           Dr. Serrano

 

                Pristiq 50 mg oral tablet extended release 24 hr                    2013        take 1 tablet (50

 mg) by oral route once daily           dose updated

 

                Vitamin B-12 1,000 mcg/mL injection solution    2011        inject 1 milliliter

 (1,000 mcg) by intramuscular route once a month    on list already

 

                    syringe with needle 1 mL 25 gauge x 1" miscellaneous syringe    2011

                          use for injection once a month     

 

                clotrimazole 1 % topical cream    2011        apply to the affected and surrounding

 areas of skin by topical route 2 times per day in the morning and evening     

 

                Vitamin D2 50,000 unit oral capsule    2011        take 1 capsule (50,000

 unit) by oral route once weekly        generic on list

 

                Pravachol 40 mg oral tablet    2012        take 1 tablet (40 mg) by oral 

route once daily for 90 days            generic on list

 

                lithium carbonate 300 mg oral capsule    2012        take 1 capsule by oral

 route daily                            dose updated

 

                Pristiq 100 mg oral tablet extended release 24 hr                    4/10/2012       take 1 and 1/2 

tablet (150 mg) by oral route once daily    Mental Health provider

 

                Pristiq 100 mg oral tablet extended release 24 hr                    4/10/2012       take 1 and 1/2 

tablet (150 mg) by oral route once daily    Discontinued by Dr Efrain [facility] at [facility]

 

                hydroxyzine HCl 50 mg oral tablet    10/16/2014      2015       take 1 tablet (50 mg) 

by oral route at bedtime                 

 

                lithium carbonate 300 mg oral capsule    2015       take 1 capsule (300

 mg) by oral route 2 for 30 days         

 

                fluconazole 100 mg oral tablet    2015       12/3/2015       take 1 tablet (100 mg) by 

oral route once a week                   

 

                ketoconazole 2 % topical cream    2015       12/3/2015       apply to the affected area(s)

 by topical route 2 times per day        

 

                prednisone 10 mg oral tablet    12/3/2015       2016        take 2 tablets (20 mg) by oral

 route once daily for 4 days 1 tablet daily for 4 days 0.5 tablet daily for 4 
days                                     







Problem List







                    Description         Status              Onset

 

                    Artificial opening status; colostomy    Active               

 

                    Bipolar disorder, unspecified    Active               

 

                    Hyperlipidemia      Active               

 

                    Peritoneal Neoplasm, Malignant    Active               

 

                    Anemia, Pernicious    Active               

 

                    Arthritis unspecified    Active               

 

                    B12 deficiency      Active               







Vital Signs







      Date    Time    BP-Sys(mm[Hg]    BP-Lynn(mm[Hg])    HR(bpm)    RR(rpm)    Temp    WT    HT    HC    BMI

                    BSA                 BMI Percentile      O2 Sat(%)

 

       2017    1:51:00 PM    160 mmHg    90 mmHg    100 bpm    20 rpm    96.5 F    179 lbs             

                                                                98 %

 

       2016    3:11:00 PM    134 mmHg    76 mmHg    80 bpm    20 rpm    98 F    163 lbs    69 in     

                24.0706 kg/m    1.8972 m                      98 %

 

        2016    2:04:00 PM    142 mmHg    86 mmHg    68 bpm    16 rpm    98.5 F    166 lbs    63 in

                          29.41 kg/m2    1.83 m2                   100 %

 

        2016    11:27:00 AM    148 mmHg    78 mmHg    90 bpm    20 rpm    98.2 F    153 lbs    69 in

                          22.5939 kg/m    1.8381 m                 96 %

 

        12/3/2015    9:50:00 AM    132 mmHg    70 mmHg    62 bpm    16 rpm    97.9 F    145 lbs    69 in

                          21.41 kg/m2    1.79 m2                   100 %

 

        2015    8:52:00 AM    132 mmHg    68 mmHg    52 bpm    20 rpm    97.8 F    141 lbs    69 in

                          20.8218 kg/m    1.7645 m                 100 %

 

        2015    3:25:00 PM    120 mmHg    62 mmHg    72 bpm    16 rpm    98.1 F    136 lbs    69 in

                          20.08 kg/m2    1.73 m2                   98 %

 

       3/23/2015    2:55:00 PM    130 mmHg    76 mmHg    68 bpm    18 rpm    97 F    140 lbs    69 in    

                20.6742 kg/m    1.7583 m                      98 %

 

        10/16/2014    11:11:00 AM    120 mmHg    66 mmHg    77 bpm    20 rpm    98 F    130 lbs    69 in

                          19.20 kg/m2    1.69 m2                   100 %

 

        2014    3:21:00 PM    130 mmHg    66 mmHg    63 bpm    18 rpm    97.2 F    160 lbs    69 in

                          23.6276 kg/m    1.8797 m                 99 %

 

        2013    10:35:00 AM    132 mmHg    70 mmHg    66 bpm    20 rpm    98.1 F    157 lbs    69 in

                          23.18 kg/m2    1.86 m2                    

 

        2013    1:29:00 PM    132 mmHg    70 mmHg    76 bpm    18 rpm    98.2 F    166 lbs    69 in 

                          24.5137 kg/m    1.9146 m                  

 

       2013    2:46:00 PM    128 mmHg    70 mmHg    76 bpm    16 rpm    98 F    160 lbs    69 in     

                23.63 kg/m2     1.88 m2                          

 

        2011    8:49:00 AM    128 mmHg    78 mmHg    70 bpm    18 rpm    97.9 F    164 lbs    69 in

                          24.2183 kg/m    1.903 m                  

 

     2011    1:31:00 PM    132 mmHg    68 mmHg    84 bpm         97 F    167 lbs                        

                                         

 

        2011    9:09:00 AM    128 mmHg    70 mmHg    72 bpm    18 rpm    98.2 F    163 lbs    64 in 

                          27.9786 kg/m    1.8272 m                  

 

       2011    10:01:00 AM    132 mmHg    70 mmHg    72 bpm    18 rpm    98.2 F    154 lbs             

                                                                 

 

       2011    2:47:00 PM    128 mmHg    70 mmHg    72 bpm    18 rpm    97.8 F    156 lbs             

                                                                 

 

       5/10/2011    3:16:00 PM    144 mmHg    80 mmHg    72 bpm    18 rpm    98.2 F    158 lbs             

                                                                 

 

        2011    10:11:00 AM    132 mmHg    70 mmHg    70 bpm    18 rpm    98.2 F    168 lbs    69 in

                          24.809 kg/m    1.9261 m                  

 

        4/15/2011    10:52:00 AM    110 mmHg    60 mmHg    75 bpm    16 rpm    97.5 F    172.375 lbs    

69 in                     25.46 kg/m2    1.95 m2                   100 %

 

        2011    11:43:00 AM    120 mmHg    82 mmHg    75 bpm    16 rpm    97.2 F    178.5 lbs    69

 in                       26.3596 kg/m    1.9854 m                 100 %

 

        10/15/2010    1:32:00 PM    120 mmHg    70 mmHg    80 bpm    18 rpm    96.6 F    177 lbs    69 in

                          26.14 kg/m2    1.98 m2                   100 %

 

        2010    3:50:00 PM    168 mmHg    100 mmHg    82 bpm    18 rpm    97.8 F    177.5 lbs    69

 in                       26.2119 kg/m    1.9798 m                 97 %

 

        2010    1:21:00 PM    140 mmHg    80 mmHg    59 bpm    16 rpm    97.6 F    173.25 lbs    69 

in                        25.58 kg/m2    1.96 m2                   100 %

 

        2010    3:02:00 PM    140 mmHg    80 mmHg    61 bpm    16 rpm    97.6 F    173.125 lbs    69

 in                       25.5658 kg/m    1.9553 m                 99 %

 

        2010    1:23:00 PM    130 mmHg    80 mmHg    66 bpm    16 rpm    96.8 F    173 lbs    69 in 

                          25.55 kg/m2    1.95 m2                   100 %

 

        2010    12:58:00 PM    130 mmHg    88 mmHg    75 bpm    16 rpm    98.4 F    172.25 lbs    69

 in                       25.4366 kg/m    1.9503 m                 100 %







Social History







                    Name                Description         Comments

 

                    denies alcohol use                         

 

                    denies smoking                           

 

                    Denies illicit substance abuse                         

 

                    retired                                 direct care

 

                    Single                                   

 

                    Exercises regularly                         

 

                    Attended some college                         

 

                    Tobacco             Never smoker         







History of Procedures







                    Date Ordered        Description         Order Status

 

                    2010 12:00 AM    COMPREHEN METABOLIC PANEL    Reviewed

 

                    2010 12:00 AM    COMPLETE CBC W/AUTO DIFF WBC    Reviewed

 

                    2010 12:00 AM    LIPID PANEL         Reviewed

 

                          2015 12:00 AM        B12 Injection, Up to 1000 Mcg NDC#1379-6093-15 [facility] Medicare 

                                        Reviewed

 

                    2011 12:00 AM    MAMMOGRAM SCREENING    Reviewed

 

                    2011 12:00 AM    CYTOPATH C/V THIN LAYER    Reviewed

 

                    2011 12:00 AM    B12 Injection 1 cc NDC#[address]-00    Reviewed

 

                    2015 12:00 AM    THER/PROPH/DIAG INJ SC/IM    Reviewed

 

                    2015 12:00 AM    B12 Injection, Up to 1000 Mcg NDC#3394-2822-91    Reviewed

 

                    2011 12:00 AM    THER/PROPH/DIAG INJ SC/IM    Reviewed

 

                    2011 12:00 AM    B12 Injection([place]) Ndc#1254-7306-27-    Reviewed

 

                    2015 12:00 AM    THER/PROPH/DIAG INJ SC/IM    Reviewed

 

                    2015 12:00 AM    B12 Injection, Up to 1000 Mcg NDC#4054-2089-42    Reviewed

 

                    10/20/2011 12:00 AM    THER/PROPH/DIAG INJ SC/IM    Reviewed

 

                    10/20/2011 12:00 AM    B12 Injection([place]) Ndc#7081-1786-82-    Reviewed

 

                    2016 12:00 AM    THER/PROPH/DIAG INJ SC/IM    Reviewed

 

                    2016 12:00 AM    B12 Injection, Up to 1000 Mcg NDC#2374-2338-25    Reviewed

 

                    3/14/2016 12:00 AM    VITAMIN B-12        Reviewed

 

                    3/15/2016 12:00 AM    THER/PROPH/DIAG INJ SC/IM    Reviewed

 

                    3/15/2016 12:00 AM    B12 Injection, Up to 1000 Mcg NDC#5048-5402-88    Reviewed

 

                    2011 12:00 AM    ***Immunization administration, Medicare flu    Reviewed

 

                    2011 12:00 AM    Fluzone ** MEDICARE Only **    Reviewed

 

                    2011 12:00 AM    THER/PROPH/DIAG INJ SC/IM    Reviewed

 

                    2011 12:00 AM    B12 Injection ([facility]) Ndc#5239-4635-84    Reviewed

 

                    2016 12:00 AM    B12 Injection, Up to 1000 Mcg NDC#8719-7182-63 [facility] Medicare    

Reviewed

 

                    2016 12:00 AM    TTE W/DOPPLER COMPLETE    Reviewed

 

                    2016 12:00 AM    EXTREMITY STUDY     Returned

 

                          2016 12:00 AM        B12 Injection, Up to 1000 Mcg NDC#7438-0533-66 [facility] 

                                        Reviewed

 

                    2016 12:00 AM    THER/PROPH/DIAG INJ SC/IM    Reviewed

 

                    2012 12:00 AM    THER/PROPH/DIAG INJ SC/IM    Reviewed

 

                    2012 12:00 AM    B12 Injection ([employer] [facility]) Ndc#0663-6310-64    Reviewed

 

                    2016 12:00 AM    THER/PROPH/DIAG INJ SC/IM    Reviewed

 

                    2016 12:00 AM    B12 Injection, Up to 1000 Mcg NDC#6539-3979-62    Reviewed

 

                    2012 12:00 AM    THER/PROPH/DIAG INJ SC/IM    Reviewed

 

                    2012 12:00 AM    B12 Injection([place]) Ndc#4900-5308-21-    Reviewed

 

                    12/15/2016 12:00 AM    B12 Injection, Up to 1000 Mcg NDC#9587-4701-91    Reviewed

 

                    12/15/2016 12:00 AM    THER/PROPH/DIAG INJ SC/IM    Reviewed

 

                    2016 12:00 AM    URNLS DIP STICK/TABLET RGNT AUTO W/O MICROSCOPY    Returned

 

                    1/3/2017 12:00 AM    URNLS DIP STICK/TABLET RGNT AUTO W/O MICROSCOPY    Returned

 

                    5/3/2012 12:00 AM    THER/PROPH/DIAG INJ SC/IM    Reviewed

 

                    5/3/2012 12:00 AM    B12 Injection([place]) Ndc#2456-8444-46-    Reviewed

 

                    2012 12:00 AM    IMMUNOTHERAPY INJECTIONS    Reviewed

 

                    2012 12:00 AM    B12 Injection([place]) Ndc#0927-6981-76-    Reviewed

 

                    2012 12:00 AM    THER/PROPH/DIAG INJ SC/IM    Reviewed

 

                    2012 12:00 AM    B12 Injection, Up to 1000 Mcg NDC#0842-4324-72    Reviewed

 

                    2012 12:00 AM    THER/PROPH/DIAG INJ SC/IM    Reviewed

 

                    2012 12:00 AM    B12 Injection, Up to 1000 Mcg NDC#0416-6857-04    Reviewed

 

                    2012 12:00 AM    THER/PROPH/DIAG INJ SC/IM    Reviewed

 

                    2012 12:00 AM    B12 Injection, Up to 1000 Mcg NDC#6333-5824-36    Reviewed

 

                    10/16/2012 12:00 AM    THER/PROPH/DIAG INJ SC/IM    Reviewed

 

                    10/16/2012 12:00 AM    B12 Injection, Up to 1000 Mcg NDC#3498-7890-47    Reviewed

 

                    2010 12:00 AM    COMPREHEN METABOLIC PANEL    Reviewed

 

                    2010 12:00 AM    COMPLETE CBC W/AUTO DIFF WBC    Reviewed

 

                    2010 12:00 AM    LIPID PANEL         Reviewed

 

                    2013 12:00 AM    Flu Injection 3 Years And Above NDC# [address]-48  [facility]    Reviewed



 

                    2013 12:00 AM    COMPLETE CBC W/AUTO DIFF WBC    Reviewed

 

                    2013 12:00 AM    ASSAY OF LITHIUM    Reviewed

 

                    2013 12:00 AM    METABOLIC PANEL TOTAL CA    Reviewed

 

                    4/3/2013 12:00 AM    THER/PROPH/DIAG INJ SC/IM    Reviewed

 

                    4/3/2013 12:00 AM    B12 Injection, Up to 1000 Mcg NDC#6323-9914-15    Reviewed

 

                    2013 12:00 AM    THER/PROPH/DIAG INJ SC/IM    Reviewed

 

                    2013 12:00 AM    B12 Injection, Up to 1000 Mcg NDC#5754-5721-32    Reviewed

 

                    2013 12:00 AM    THER/PROPH/DIAG INJ SC/IM    Reviewed

 

                    2013 12:00 AM    B12 Injection, Up to 1000 Mcg NDC#9127-7183-18    Reviewed

 

                    2013 12:00 AM    LIPID PANEL         Reviewed

 

                    2013 12:00 AM    VITAMIN D 25 HYDROXY    Reviewed

 

                    2013 12:00 AM    THER/PROPH/DIAG INJ SC/IM    Reviewed

 

                    3/6/2014 12:00 AM    THER/PROPH/DIAG INJ SC/IM    Reviewed

 

                    2014 12:00 AM    THER/PROPH/DIAG INJ SC/IM    Reviewed

 

                    2014 12:00 AM    B12 Injection, Up to 1000 Mcg NDC#4195-4832-98    Reviewed

 

                    2010 12:00 AM    SKIN FUNGI CULTURE    Reviewed

 

                    10/9/2010 12:00 AM    COMPREHEN METABOLIC PANEL    Reviewed

 

                    10/9/2010 12:00 AM    LIPID PANEL         Reviewed

 

                    2010 12:00 AM    THER/PROPH/DIAG INJ SC/IM    Reviewed

 

                    2010 12:00 AM    B12 Injection Ndc#[address]-97 ([facility])    Reviewed

 

                    2010 12:00 AM    THER/PROPH/DIAG INJ SC/IM    Reviewed

 

                    2010 12:00 AM    Kenalog 40 Mg Im-Ndc#[address]-81 ([facility])    Reviewed

 

                    10/15/2010 12:00 AM    FLU VACCINE 3 YRS & > IM    Reviewed

 

                    10/15/2010 12:00 AM    Admin.Of M/C Cov.Vaccine-Flu Vacc.    Reviewed

 

                    1/15/2011 12:00 AM    COMPLETE CBC W/AUTO DIFF WBC    Reviewed

 

                    1/15/2011 12:00 AM    COMPREHEN METABOLIC PANEL    Reviewed

 

                    1/15/2011 12:00 AM    LIPID PANEL         Reviewed

 

                    2014 12:00 AM    MAMMOGRAM SCREENING    Reviewed

 

                    2014 12:00 AM    Screening mammography, bilateral    Reviewed

 

                    7/10/2014 12:00 AM    THER/PROPH/DIAG INJ SC/IM    Reviewed

 

                    7/10/2014 12:00 AM    B12 Injection, Up to 1000 Mcg NDC#0489-5423-60    Reviewed

 

                    2011 12:00 AM    COMPLETE CBC W/AUTO DIFF WBC    Reviewed

 

                    2011 12:00 AM    COMPREHEN METABOLIC PANEL    Reviewed

 

                    2011 12:00 AM    LIPID PANEL         Reviewed

 

                    2014 12:00 AM    B12 Injection, Up to 1000 Mcg NDC#9868-5384-72    Reviewed

 

                    10/19/2014 12:00 AM    MAMMOGRAM SCREENING    Reviewed

 

                    10/19/2014 12:00 AM    Screening mammography, bilateral    Reviewed

 

                    10/16/2014 12:00 AM    COMPLETE CBC W/AUTO DIFF WBC    Reviewed

 

                    10/16/2014 12:00 AM    COMPREHEN METABOLIC PANEL    Reviewed

 

                    10/16/2014 12:00 AM    IMMUNOASSAY TUMOR     Reviewed

 

                    10/16/2014 12:00 AM    LIPID PANEL         Reviewed

 

                    10/16/2014 12:00 AM    ASSAY OF LITHIUM    Reviewed

 

                    10/16/2014 12:00 AM    MAMMOGRAM SCREENING    Reviewed

 

                    2011 12:00 AM    ASSAY OF PARATHORMONE    Reviewed

 

                    2011 12:00 AM    VITAMIN D 25 HYDROXY    Reviewed

 

                    2011 12:00 AM    ASSAY OF LITHIUM    Reviewed

 

                    2011 12:00 AM    METABOLIC PANEL TOTAL CA    Reviewed

 

                    2011 12:00 AM    CT HEAD/BRAIN W/O & W/DYE    Reviewed

 

                    3/23/2015 12:00 AM    PNEUMOCOCCAL VACC 13 GLENDY IM    Reviewed

 

                    3/23/2015 12:00 AM    Vitamin B12 injection    Reviewed

 

                    2011 12:00 AM    ASSAY OF LITHIUM    Reviewed

 

                    2011 12:00 AM    B12 Injection Ndc#[address]-93  [facility]    Reviewed

 

                    2015 12:00 AM    THER/PROPH/DIAG INJ SC/IM    Reviewed

 

                    2015 12:00 AM    B12 Injection, Up to 1000 Mcg NDC#5875-1140-92    Reviewed

 

                    2015 12:00 AM    COMPLETE CBC W/AUTO DIFF WBC    Reviewed

 

                    2015 12:00 AM    COMPREHEN METABOLIC PANEL    Reviewed

 

                    2015 12:00 AM    LIPID PANEL         Reviewed

 

                    2015 12:00 AM    ASSAY OF LITHIUM    Reviewed

 

                    2011 12:00 AM    VIT D 1 25-DIHYDROXY    Reviewed

 

                    2011 12:00 AM    VITAMIN B-12        Reviewed

 

                    2015 12:00 AM    B12 Injection, Up to 1000 Mcg NDC#2718-4888-96    Reviewed

 

                    2015 12:00 AM    THER/PROPH/DIAG INJ SC/IM    Reviewed

 

                    2015 12:00 AM    B12 Injection, Up to 1000 Mcg NDC#6630-6578-45    Reviewed

 

                    2011 12:00 AM    THER/PROPH/DIAG INJ SC/IM    Reviewed

 

                    2011 12:00 AM    B12 Injection ([employer]) Ndc#0236-3511-08    Reviewed

 

                    2015 12:00 AM    THER/PROPH/DIAG INJ SC/IM    Reviewed

 

                    2015 12:00 AM    B12 Injection, Up to 1000 Mcg NDC#9661-1722-33    Reviewed







Results Summary







                          Data and Description      Results

 

                          2004 12:00 AM        Colonoscopy-Women and Men over 50 Normal 

 

                          2008 12:00 AM         Pap Smear Declined 

 

                          10/7/2009 12:00 AM        Cholest Cry Stone Ql .0 %LDLc SerPl-mCnc 123.0 mg/dLHDLc

 SerPl-mCnc 34.0 mg/dLTrigl SerPl-mCnc 190.0 mg/dLGlucose SerPl-mCnc 78.0 mg/dL

 

                          2009 12:00 AM        Mammogram -Women over 40 Normal HIV1+2 Ab Ser Ql no risk 

 

                          2010 8:47 AM         Dexa Bone Scan Refused Aspirin reccommended Contraindication 



 

                          2010 8:48 AM         Depression Done 

 

                          2010 12:00 AM         Foot Exam-Diabetic Done 

 

                          2010 12:00 AM         Cholest Cry Stone Ql .0 %LDLc SerPl-mCnc 126.0 mg/dLGlucose

 SerPl-mCnc 102.0 mg/dL

 

                          2010 8:45 AM          TRIGLYCERIDES 122.0 mg/dLCHOLESTEROL 186.0 mg/dLHDL 36.0 mg/dLTOT

 CHOL/HDL 5.2 LDL (CALC) 126.0 mg/dLGLUCOSE 102.0 mg/dLSODIUM 143.0 
mmol/LPOTASSIUM 3.70 mmol/LCHLORIDE 111.0 mmol/LCO2 23.0 mmol/LBUN 10.0 
mg/dLCREATININE 0.80 mg/dLSGOT/AST 12.0 IU/LSGPT/ALT 11.0 IU/LALK PHOS 65.0 
IU/LTOTAL PROTEIN 7.20 g/dLALBUMIN 3.90 g/dLTOTAL BILI 0.50 mg/dLCALCIUM 10.20 
mg/dLAGE 59 GFR NonAA 73 GFR AA 88 eGFR >60 mL/min/1.73 m2eGFR AA* >60 WBC 5.7 
RBC 3.26 HGB 10.60 g/dLHCT 31.70 %MCV 97.0 fLMCH 32.50 pgMCHC 33.40 g/dLRDW SD 
47 RDW CV 13.30 %MPV 9.70 fLPLT 287 NRBC# 0.00 NRBC% 0.0 %NEUT 62.90 %%LYMP 
21.80 %%MONO 9.90 %%EOS 5.0 %%BASO 0.40 %#NEUT 3.56 #LYMP 1.23 #MONO 0.56 #EOS 
0.28 #BASO 0.02 MANUAL DIFF NOT IND 

 

                          2010 12:00 AM        Glucose SerPl-mCnc 96.0 mg/dLCholest Cry Stone Ql .0 %LDLc

 SerPl-mCnc 146.0 mg/dL

 

                          2010 8:26 AM         TRIGLYCERIDES 106.0 mg/dLCHOLESTEROL 199.0 mg/dLHDL 32.0 mg/dLTOT

 CHOL/HDL 6.2 LDL (CALC) 146.0 mg/dLGLUCOSE 96.0 mg/dLSODIUM 143.0 
mmol/LPOTASSIUM 4.0 mmol/LCHLORIDE 113.0 mmol/LCO2 24.0 mmol/LBUN 13.0 
mg/dLCREATININE 1.0 mg/dLSGOT/AST 11.0 IU/LSGPT/ALT 6.0 IU/LALK PHOS 56.0 
IU/LTOTAL PROTEIN 6.60 g/dLALBUMIN 3.80 g/dLTOTAL BILI 0.50 mg/dLCALCIUM 9.30 
mg/dLAGE 59 GFR NonAA 57 GFR AA 69 eGFR 57 eGFR AA* >60 

 

                          10/6/2010 12:00 AM        Cholest Cry Stone Ql .0 %LDLc SerPl-mCnc 111.0 mg/dLGlucose

 SerPl-mCnc 81.0 mg/dL

 

                          10/6/2010 2:45 PM         TRIGLYCERIDES 123.0 mg/dLCHOLESTEROL 178.0 mg/dLHDL 42.0 mg/dLTOT

 CHOL/HDL 4.2 LDL (CALC) 111.0 mg/dLGLUCOSE 81.0 mg/dLSODIUM 139.0 
mmol/LPOTASSIUM 4.10 mmol/LCHLORIDE 106.0 mmol/LCO2 24.0 mmol/LBUN 13.0 
mg/dLCREATININE 0.90 mg/dLSGOT/AST 13.0 IU/LSGPT/ALT 11.0 IU/LALK PHOS 61.0 
IU/LTOTAL PROTEIN 7.10 g/dLALBUMIN 3.90 g/dLTOTAL BILI 0.30 mg/dLCALCIUM 9.30 
mg/dLAGE 60 GFR NonAA 64 GFR AA 78 eGFR >60 mL/min/1.73 m2eGFR AA* >60 WBC 6.9 
RBC 3.59 HGB 11.50 g/dLHCT 35.30 %MCV 98.0 fLMCH 32.0 pgMCHC 32.60 g/dLRDW SD 46
 RDW CV 12.90 %MPV 9.90 fLPLT 311 NRBC# 0.00 NRBC% 0.0 %NEUT 64.90 %%LYMP 22.50 
%%MONO 7.20 %%EOS 5.10 %%BASO 0.30 %#NEUT 4.45 #LYMP 1.54 #MONO 0.49 #EOS 0.35 
#BASO 0.02 MANUAL DIFF NOT IND 

 

                          2011 12:00 AM         Mammogram -Women over 40 Ordered 

 

                          2011 10:25 AM        TRIGLYCERIDES 111.0 mg/dLCHOLESTEROL 195.0 mg/dLHDL 43.0 mg/dLTOT

 CHOL/HDL 4.5 LDL (CALC) 130.0 mg/dLWBC 5.3 RBC 3.76 HGB 12.0 g/dLHCT 37.80 %MCV
 101.0 fLMCH 31.90 pgMCHC 31.70 g/dLRDW SD 47 RDW CV 13.0 %MPV 9.70 fLPLT 259 
NRBC# 0.00 NRBC% 0.0 %NEUT 69.0 %%LYMP 17.60 %%MONO 8.30 %%EOS 4.70 %%BASO 0.40 
%#NEUT 3.63 #LYMP 0.93 #MONO 0.44 #EOS 0.25 #BASO 0.02 MANUAL DIFF NOT IND 
GLUCOSE 102.0 mg/dLSODIUM 146.0 mmol/LPOTASSIUM 4.20 mmol/LCHLORIDE 113.0 
mmol/LCO2 23.0 mmol/LBUN 15.0 mg/dLCREATININE 1.0 mg/dLSGOT/AST 12.0 
IU/LSGPT/ALT 17.0 IU/LALK PHOS 60.0 IU/LTOTAL PROTEIN 6.90 g/dLALBUMIN 4.20 
g/dLTOTAL BILI 0.40 mg/dLCALCIUM 9.70 mg/dLAGE 60 GFR NonAA 57 GFR AA 69 eGFR 57
 eGFR AA* >60 

 

                          2011 11:49 AM        Cholest Cry Stone Ql .0 %LDLc SerPl-mCnc 130.0 mg/dLHDLc

 SerPl-mCnc 43.0 mg/dLTrigl SerPl-mCnc 111.0 mg/dLGlucose SerPl-mCnc 102.0 mg/dL

 

                          2011 11:52 AM        Pap Smear Declined 

 

                          2011 11:28 AM        Lithium 2.080 mmol/LGLUCOSE 102.0 mg/dLSODIUM 135.0 mmol/LPOTASSIUM

 3.90 mmol/LCHLORIDE 106.0 mmol/LCO2 21.0 mmol/LBUN 12.0 mg/dLCREATININE 1.30 
mg/dLCALCIUM 10.70 mg/dLAGE 60 GFR NonAA 42 GFR AA 51 eGFR 42 eGFR AA* 51 

 

                          2011 8:58 AM          Lithium 0.690 mmol/L

 

                          2011 2:38 PM         VITAMIN B12 3483.0 pg/mL

 

                          2013 3:35 PM          WBC 5.1 RBC 3.73 HGB 11.70 g/dLHCT 36.40 %MCV 98.0 fLMCH 31.40

 pgMCHC 32.10 g/dLRDW SD 47 RDW CV 13.10 %MPV 9.80 fLPLT 224 NRBC# 0.00 NRBC% 
0.0 %NEUT 66.80 %%LYMP 19.10 %%MONO 9.0 %%EOS 4.90 %%BASO 0.20 %#NEUT 3.42 #LYMP
 0.98 #MONO 0.46 #EOS 0.25 #BASO 0.01 MANUAL DIFF NOT IND GLUCOSE 88.0 
mg/dLSODIUM 141.0 mmol/LPOTASSIUM 4.10 mmol/LCHLORIDE 110.0 mmol/LCO2 22.0 
mmol/LBUN 22.0 mg/dLCREATININE 1.10 mg/dLCALCIUM 9.80 mg/dLAGE 62 GFR NonAA 50 
GFR AA 61 eGFR 50 eGFR AA* 60 Lithium 0.760 mmol/L

 

                          2013 11:02 AM        TRIGLYCERIDES 106.0 mg/dLCHOLESTEROL 181.0 mg/dLHDL 46.0 mg/dLTOT

 CHOL/HDL 3.9 LDL (CALC) 114.0 mg/dLVITAMIN D 41.10 ng/mL

 

                          10/17/2014 10:10 AM       WBC 5.0 RBC 3.66 HGB 11.60 g/dLHCT 36.80 %.0 fLMCH 31.70

 pgMCHC 31.50 g/dLRDW SD 50 RDW CV 13.50 %MPV 10.10 fLPLT 209 NRBC# 0.00 NRBC% 
0.0 %NEUT 69.20 %%LYMP 21.0 %%MONO 6.40 %%EOS 3.20 %%BASO 0.20 %#NEUT 3.46 #LYMP
 1.05 #MONO 0.32 #EOS 0.16 #BASO 0.01 MANUAL DIFF NOT IND GLUCOSE 100.0 
mg/dLSODIUM 148.0 mmol/LPOTASSIUM 3.90 mmol/LCHLORIDE 114.0 mmol/LCO2 26.0 
mmol/LBUN 12.0 mg/dLCREATININE 1.20 mg/dLSGOT/AST 9.0 IU/LSGPT/ALT <6 IU/LALK 
PHOS 82.0 IU/LTOTAL PROTEIN 6.90 g/dLALBUMIN 4.0 g/dLTOTAL BILI 0.40 
mg/dLCALCIUM 10.50 mg/dLAGE 64 GFR NonAA 45 GFR AA 55 eGFR 45 eGFR AA* 55 
TRIGLYCERIDES 96.0 mg/dLCHOLESTEROL 155.0 mg/dLHDL 38.0 mg/dLTOT CHOL/HDL 4.1 
LDL (CALC) 98.0 mg/dLLithium 0.850 mmol/LCancer Antigen (CA) 125 8.30 U/mL

 

                          2015 10:25 AM        Lithium 0.790 mmol/LWBC 4.8 RBC 3.44 HGB 11.0 g/dLHCT 35.20 

%.0 fLMCH 32.0 pgMCHC 31.30 g/dLRDW SD 53 RDW CV 14.0 %MPV 9.30 fLPLT 210
 NRBC# 0.00 NRBC% 0.0 %NEUT 70.80 %%LYMP 17.20 %%MONO 8.10 %%EOS 3.50 %%BASO 
0.40 %#NEUT 3.41 #LYMP 0.83 #MONO 0.39 #EOS 0.17 #BASO 0.02 MANUAL DIFF NOT IND 
TRIGLYCERIDES 107.0 mg/dLCHOLESTEROL 174.0 mg/dLHDL 43.0 mg/dLTOT CHOL/HDL 4.0 
LDL (CALC) 110.0 mg/dLGLUCOSE 90.0 mg/dLSODIUM 145.0 mmol/LPOTASSIUM 3.80 
mmol/LCHLORIDE 115.0 mmol/LCO2 24.0 mmol/LBUN 17.0 mg/dLCREATININE 1.30 
mg/dLSGOT/AST 18.0 IU/LSGPT/ALT 17.0 IU/LALK PHOS 56.0 IU/LTOTAL PROTEIN 6.70 
g/dLALBUMIN 3.90 g/dLTOTAL BILI 0.40 mg/dLCALCIUM 9.80 mg/dLAGE 64 GFR NonAA 41 
GFR AA 50 eGFR 41 eGFR AA* 50 

 

                          2015 8:50 AM        WBC 5.8 RBC 3.29 HGB 10.70 g/dLHCT 34.0 %.0 fLMCH 32.50

 pgMCHC 31.50 g/dLRDW SD 52 RDW CV 13.60 %MPV 9.60 fLPLT 223 NRBC# 0.00 NRBC% 
0.0 %NEUT 69.60 %%LYMP 18.90 %%MONO 8.50 %%EOS 2.80 %%BASO 0.20 %#NEUT 4.03 
#LYMP 1.09 #MONO 0.49 #EOS 0.16 #BASO 0.01 MANUAL DIFF NOT IND Lithium 0.620 
mmol/LGLUCOSE 83.0 mg/dLSODIUM 139.0 mmol/LPOTASSIUM 3.90 mmol/LCHLORIDE 109.0 
mmol/LCO2 22.0 mmol/LBUN 19.0 mg/dLCREATININE 1.40 mg/dLSGOT/AST 19.0 
IU/LSGPT/ALT 21.0 IU/LALK PHOS 55.0 IU/LTOTAL PROTEIN 6.50 g/dLALBUMIN 3.90 
g/dLTOTAL BILI 0.50 mg/dLCALCIUM 9.60 mg/dLAGE 65 GFR NonAA 38 GFR AA 46 eGFR 38
 eGFR AA* 46 TRIGLYCERIDES 121.0 mg/dLCHOLESTEROL 192.0 mg/dLHDL 51.0 mg/dLTOT 
CHOL/HDL 3.8 .0 mg/dLFREE T4 0.79 TSH 1.210 uIU/mLHemoglobin A1c 5.40 
%Estim. Avg Glu (eAG) 108 

 

                          3/15/2016 8:08 AM         VITAMIN B12 696.0 pg/mL

 

                          3/23/2016 8:26 AM         WBC 7.0 RBC 3.61 HGB 11.80 g/dLHCT 37.70 %.0 fLMCH 32.70

 pgMCHC 31.30 g/dLRDW SD 49 RDW CV 12.50 %MPV 10.0 fLPLT 207 NRBC# 0.00 NRBC% 
0.0 %NEUT 73.60 %%LYMP 16.40 %%MONO 6.60 %%EOS 3.0 %%BASO 0.30 %#NEUT 5.15 #LYMP
 1.15 #MONO 0.46 #EOS 0.21 #BASO 0.02 MANUAL DIFF NOT IND Lithium 0.940 
mmol/LGLUCOSE 108.0 mg/dLSODIUM 143.0 mmol/LPOTASSIUM 4.30 mmol/LCHLORIDE 110.0 
mmol/LCO2 27.0 mmol/LBUN 16.0 mg/dLCREATININE 1.60 mg/dLSGOT/AST 13.0 
IU/LSGPT/ALT 7.0 IU/LALK PHOS 71.0 IU/LTOTAL PROTEIN 6.80 g/dLALBUMIN 4.0 
g/dLTOTAL BILI 0.20 mg/dLCALCIUM 10.40 mg/dLAGE 65 GFR NonAA 32 GFR AA 39 eGFR 
32 eGFR AA* 39 TRIGLYCERIDES 113.0 mg/dLCHOLESTEROL 169.0 mg/dLHDL 42.0 mg/dLTOT
 CHOL/HDL 4.0 LDL (CALC) 104.0 mg/dLFREE T4 0.86 TSH 2.20 uIU/mLHemoglobin A1c 
5.20 %Estim. Avg Glu (eAG) 103 

 

                          3/25/2016 9:17 AM         COLOR YELLOW APPEARANCE CLEAR SPEC GRAV 1.010 pH 7.0 PROTEIN 

NEGATIVE GLUCOSE NEGATIVE mg/dLKETONE NEGATIVE BILIRUBIN NEGATIVE BLOOD NEGATIVE
 NITRITE NEGATIVE [place] SCREEN SMALL MICRO IND? SEE BELOW WBC/HPF 0-5 RBC/HPF 
NEGATIVE CASTS/LPF NEGATIVE /LPFCRYSTALS NEGATIVE MUCOUS THRDS NEGATIVE BACTERIA
 NEGATIVE EPITH CELLS FEW SQUAMOUS /HPFTRICHOMONAS NEGATIVE YEAST NEGATIVE 

 

                          2016 6:00 AM        GLUCOSE 91.0 mg/dLSODIUM 143.0 mmol/LPOTASSIUM 3.60 mmol/LCHLORIDE

 112.0 mmol/LCO2 23.0 mmol/LBUN 22.0 mg/dLCREATININE 1.20 mg/dLSGOT/AST 15.0 
IU/LSGPT/ALT 12.0 IU/LALK PHOS 61.0 IU/LTOTAL PROTEIN 5.40 g/dLALBUMIN 3.10 
g/dLTOTAL BILI 0.40 mg/dLCALCIUM 8.40 mg/dLAGE 66 GFR NonAA 45 GFR AA 55 eGFR 45
 eGFR AA* 55 WBC 3.0 RBC 3.05 HGB 9.80 g/dLHCT 32.10 %.0 fLMCH 32.10 
pgMCHC 30.50 g/dLRDW SD 54 RDW CV 14.20 %MPV 10.10 fLPLT 170 NRBC# 0.00 NRBC% 
0.0 %NEUT 50.70 %%LYMP 32.60 %%MONO 10.50 %%EOS 5.90 %%BASO 0.30 %#NEUT 1.54 
#LYMP 0.99 #MONO 0.32 #EOS 0.18 #BASO 0.01 MANUAL DIFF NOT IND 

 

                          2016 2:09 PM        COLOR YELLOW APPEARANCE CLEAR SPEC GRAV 1.010 pH 5.0 PROTEIN

 30 GLUCOSE NEGATIVE mg/dLKETONE NEGATIVE BILIRUBIN NEGATIVE BLOOD LARGE NITRITE
 NEGATIVE [place] SCREEN MODERATE MICRO INDICATED? SEE BELOW WBC/HPF  RBC/HPF
 20-50 CASTS/LPF NEGATIVE /LPFCRYSTALS NEGATIVE MUCOUS THRDS NEGATIVE BACTERIA 
NEGATIVE EPITH CELLS FEW SQUAMOUS /HPFTRICHOMONAS NEGATIVE YEAST NEGATIVE CULT 
SET UP? YES 

 

                          1/3/2017 4:08 PM          COLOR YELLOW APPEARANCE HAZY SPEC GRAV 1.015 pH 6.0 PROTEIN 30

 GLUCOSE NEGATIVE mg/dLKETONE NEGATIVE BILIRUBIN NEGATIVE BLOOD MODERATE NITRITE
 NEGATIVE [place] SCREEN LARGE MICRO INDICATED? SEE BELOW WBC/-200 RBC/HPF 
5-10 CASTS/LPF NEGATIVE /LPFCRYSTALS NEGATIVE MUCOUS THRDS NEGATIVE BACTERIA 
NEGATIVE EPITH CELLS 1+ SQUAMOUS /HPFTRICHOMONAS NEGATIVE YEAST NEGATIVE CULT 
SET UP? YES 







History Of Immunizations







       Name    Date Admin    Mfg Name    Mfg Code    Trade Name    Lot#    Route    Inj    Vis Given    Vis

 Pub                                    CVX

 

        Influenza    2008    Not Entered    NE      Not Entered            Not Entered    Not Entered

                    1            999

 

        X       12/19/2008    [employer]    MSD     Pneumovax 23            Intramuscular    Not Entered

                    1            999

 

           Influenza    10/15/2010    [employer]    NOV        Fluvirin > 12 Years    

[id number]     Intramuscular    Left Deltoid    10/15/2010    2009    999

 

          X         3/23/2015    [employer]    [facility]       Prevnar 13    [id number]    Intramuscular

                Right Gluteous Medius    3/23/2015       2013       109







History of Past Illness







                    Name                Date of Onset       Comments

 

                    Peritoneal Neoplasm, Malignant                         

 

                    Hyperlipidemia                           

 

                    Bipolar disorder, unspecified                         

 

                    Artificial opening status; colostomy                         

 

                    B12 deficiency                           

 

                    Anemia, Pernicious                         

 

                    Arthritis unspecified                         

 

                    cervical cancer                          

 

                    Artificial opening status; colostomy    2010  1:10PM     

 

                    Bipolar disorder, unspecified    2010  1:10PM     

 

                    Hyperlipidemia      2010  1:10PM     

 

                    Anemia, Pernicious    2010  1:10PM     

 

                    Postoperative Follow-Up    2010  1:55PM     

 

                    Postoperative Follow-Up    Mar  8 2010 10:57AM     

 

                    Artificial opening status; colostomy    Mar  8 2010  1:19PM     

 

                    Peritoneal Neoplasm, Malignant    Mar  8 2010  1:19PM     

 

                    Artificial opening status; colostomy    2010  1:40PM     

 

                    Hyperlipidemia      2010  1:40PM     

 

                    Anemia, Pernicious    2010  1:40PM     

 

                    Peritoneal Neoplasm, Malignant    2010  1:40PM     

 

                    Arthritis unspecified    2010  1:40PM     

 

                    Anemia of Chronic Illness    2010  1:40PM     

 

                    Tinea corporis      2010  3:17PM     

 

                    Bipolar disorder, unspecified    2010  1:33PM     

 

                    Hyperlipidemia      2010  1:33PM     

 

                    Anemia, Pernicious    2010  1:33PM     

 

                    Peritoneal Neoplasm, Malignant    2010  1:33PM     

 

                    B12 deficiency      2010  1:33PM     

 

                    Ethmoidal Sinusitis, Acute    Sep 21 2010  3:53PM     

 

                    Wheezing            Sep 21 2010  3:53PM     

 

                    Flu                 Oct 15 2010  1:40PM     

 

                    Bipolar disorder, unspecified    Oct 15 2010  1:42PM     

 

                    Hyperlipidemia      Oct 15 2010  1:42PM     

 

                    Anemia, Pernicious    Oct 15 2010  1:42PM     

 

                    Peritoneal Neoplasm, Malignant    Oct 15 2010  1:42PM     

 

                    Bipolar disorder, unspecified    2011 12:01PM     

 

                    Hyperlipidemia      2011 12:01PM     

 

                    Anemia, Pernicious    2011 12:01PM     

 

                    Peritoneal Neoplasm, Malignant    2011 12:01PM     

 

                    Bipolar disorder, unspecified    Apr 15 2011 10:55AM     

 

                    Major Depression    2011 10:11AM     

 

                    Bipolar Disorder    2011 10:11AM     

 

                    Cancer              May 10 2011  4:16PM     

 

                    Major Depression    May 10 2011  3:16PM     

 

                    Bipolar Disorder    May 10 2011  3:16PM     

 

                    Hypercalcemia       May 23 2011  2:47PM     

 

                    Bipolar disorder, unspecified    May 23 2011  2:47PM     

 

                    Colon Cancer, Personal History    May 23 2011  2:47PM     

 

                    Bipolar Disorder    May 31 2011  4:39PM     

 

                    Depressive Disorder    2011 10:01AM     

 

                    Vitamin B12 deficiency    2011 10:01AM     

 

                    Vitamin D Deficiency    2011  5:07PM     

 

                    Anemia, Vitamin B12 Deficiency    2011  5:07PM     

 

                    B12 deficiency      2011  3:56PM     

 

                    Routine gynecological examination    Aug  4 2011  9:08AM     

 

                    Screening Examination for Breast Cancer    Aug  4 2011  9:08AM     

 

                    Tinea Corporis      Aug  4 2011  9:08AM     

 

                    Depressive Disorder    Sep 23 2011  8:47AM     

 

                    Contact Dermatitis    Sep 23 2011  8:47AM     

 

                    Anemia, Pernicious    Sep 23 2011  8:47AM     

 

                    B12 deficiency      Sep 23 2011  8:47AM     

 

                    B12 deficiency      Sep 27 2011  2:58PM     

 

                    B12 deficiency      Oct 20 2011  2:34PM     

 

                    Flu                 Dec  9 2011  3:16PM     

 

                    B12 deficiency      Dec  9 2011  3:17PM     

 

                    B12 deficiency      2012  4:52PM     

 

                    B12 deficiency      Feb 2012 11:10AM     

 

                    B12 deficiency      2012  3:37PM     

 

                    B12 deficiency      May  3 2012  4:10PM     

 

                    B12 deficiency      2012  2:54PM     

 

                    B12 deficiency      2012 11:23AM     

 

                    B12 deficiency      Aug  9 2012  2:08PM     

 

                    B12 deficiency      Sep  6 2012  4:36PM     

 

                    B12 deficiency      Oct 16 2012 10:23AM     

 

                    Flu                 Feb  4   3:11PM     

 

                    Bipolar disorder, unspecified    Feb  4   2:48PM     

 

                    Anemia, Pernicious    Feb  4   2:48PM     

 

                    B12 deficiency      Feb  4   2:48PM     

 

                    Extrapyramidal abnormal movement disorder    Feb  4   2:48PM     

 

                    B12 deficiency      Apr  3 2013 12:03PM     

 

                    Bipolar disorder, unspecified    May  7 2013  1:31PM     

 

                    Anemia, Pernicious    May  7 2013  1:31PM     

 

                    B12 deficiency      May  7 2013  1:31PM     

 

                    Extrapyramidal abnormal movement disorder    May  7 2013  1:31PM     

 

                    B12 deficiency      2013  3:42PM     

 

                    B12 deficiency      2013  1:31PM     

 

                    Hyperlipidemia      Aug  7 2013 10:37AM     

 

                    Vitamin D Deficiency    Aug  7 2013 10:37AM     

 

                    Bipolar disorder, unspecified    Aug  7 2013 10:37AM     

 

                    Anemia, Pernicious    Aug  7 2013 10:37AM     

 

                    B12 deficiency      Aug  7 2013 10:37AM     

 

                    B12 deficiency      Sep 25 2013 11:15AM     

 

                    B12 deficiency      Dec 11 2013  3:16PM     

 

                    B12 deficiency      Mar  6 2014  1:48PM     

 

                    B12 deficiency      May 21 2014  3:17PM     

 

                    Screening Examination for Breast Cancer    2014  3:23PM     

 

                    Periumbilical abdominal pain    2014  3:23PM     

 

                    B12 deficiency      Jul 10 2014  2:52PM     

 

                    Anemia, Vitamin B12 Deficiency    Aug 13 2014  4:50PM     

 

                    Bipolar disorder    Oct 16 2014 11:13AM     

 

                    Hyperlipidemia      Oct 16 2014 11:13AM     

 

                    Anemia, Pernicious    Oct 16 2014 11:13AM     

 

                    Peritoneal Neoplasm, Malignant    Oct 16 2014 11:13AM     

 

                    Screening breast examination    Oct 16 2014 11:13AM     

 

                    Weight loss         Oct 16 2014 11:13AM     

 

                    Anemia, Pernicious    Mar 23 2015  2:57PM     

 

                    B12 deficiency      Mar 23 2015  2:57PM     

 

                    Need for Prevnar vaccine    Mar 23 2015  2:57PM     

 

                    Bipolar disorder    Mar 23 2015  2:57PM     

 

                    Hyperlipidemia      Mar 23 2015  2:57PM     

 

                    Anemia, Pernicious    Mar 23 2015  2:57PM     

 

                    Peritoneal Neoplasm, Malignant    Mar 23 2015  2:57PM     

 

                    B12 deficiency      May  4 2015  4:48PM     

 

                    Hyperlipidemia      May 13 2015  9:56AM     

 

                    Anemia              May 13 2015  9:56AM     

 

                    Bipolar disorder    May 13 2015  9:56AM     

 

                    Bipolar disorder    May 14 2015  3:27PM     

 

                    Hyperlipidemia      May 14 2015  3:27PM     

 

                    Anemia, Pernicious    May 14 2015  3:27PM     

 

                    Peritoneal Neoplasm, Malignant    May 14 2015  3:27PM     

 

                    B12 deficiency      2015  2:20PM     

 

                    B12 deficiency      2015 11:34AM     

 

                    B12 deficiency      Aug 18 2015  9:06AM     

 

                    Tinea Corporis      Sep 18 2015  8:54AM     

 

                    B12 deficiency      Sep 18 2015  8:54AM     

 

                    B12 deficiency      2015 10:28AM     

 

                    Herpes zoster without complication    Dec  3 2015  9:52AM     

 

                    B12 deficiency      Dec 23 2015 11:21AM     

 

                    B12 deficiency      2016  4:51PM     

 

                    Vitamin B 12 deficiency    Mar 14 2016  5:35PM     

 

                    B12 deficiency      Mar 15 2016 12:14PM     

 

                    B12 deficiency      May  5 2016 11:30AM     

 

                    Edema               May  5 2016 11:30AM     

 

                    Dermatitis          May  5 2016 11:30AM     

 

                    Edema               May 17 2016  8:38AM     

 

                    Shortness of breath    May 17 2016  8:38AM     

 

                    Bilateral edema of lower extremity    2016  2:06PM     

 

                    B12 deficiency      2016  2:06PM     

 

                    B12 deficiency      2016 11:50AM     

 

                    B12 deficiency      2016 11:20AM     

 

                    Diarrhea            Aug  2 2016  3:13PM     

 

                    B12 deficiency      Aug 24 2016 11:10AM     

 

                    Encounter for screening mammogram for breast cancer    Aug 24 2016 11:44AM     

 

                    B12 deficiency      Sep 28 2016  2:35PM     

 

                    B12 deficiency      Dec 15 2016  2:02PM     

 

                    Dysuria             Dec 29 2016 12:14PM     

 

                    Hematuria           Fidencio  3 2017  1:33PM     

 

                    Constipation by delayed colonic transit    2017  1:52PM     

 

                    Ileus               2017  1:52PM     







Payers







           Insurance Name    Company Name    Plan Name    Plan Number    Policy Number    Policy Group

 Number                                 Start Date

 

                    Medicare Part A    Medicare [facility]              [id number]              N/A

 

                          [employer]    [employer]                 [id number]

                                                    

 

                    Medicare Part A    Medicare - Lab/Xray              [id number]              2006

 

                    Medicare Part B    [employer] [place]              [id number]              2006

 

                          [employer]    [employer]                 50 percent

                                                    2009

 

                    Human    [employer] [facility]              [id number]              N/A

 

                    Medicare Part A    Medicare Part A              [id number]              N/A

 

                    Medicare Part A    Medicare Part A              [id number]              2006









History of Encounters







                    Visit Date          Visit Type          Provider

 

                    2017            Office visit        Devin Masterson MD

 

                    2016          [facility]            Devin Masterson MD

 

                    12/15/2016          Nurse visit         Bhupinder Aspen DO

 

                    2016           Nurse visit         Bret Forte APRFIORELLA

 

                    2016           Nurse visit         Bhupinder Aspen DO

 

                    2016            Office visit        Bhupinder Aspen DO

 

                    2016           Nurse visit         Bhupinder Aspen DO

 

                    2016           Office visit        Bret Forte APRFIORELLA

 

                    2016            Office visit        Bhupinder Aspen DO

 

                    3/15/2016           Nurse visit         Bhupinder Aspen DO

 

                    2016            Nurse visit         Bhupinder Aspen DO

 

                    2015          Nurse visit         Bhupinder Aspen DO

 

                    12/3/2015           Office visit        Bhupinder Aspen DO

 

                    2015          Nurse visit         Bhupinder Aspen DO

 

                    2015           Office visit        Bhupinder Aspen DO

 

                    2015           Nurse visit         Bhupinder Aspen DO

 

                    2015            Nurse visit         Bhupinder Aspen DO

 

                    2015            Nurse visit         Bhupinder Aspen DO

 

                    2015           Office visit        Bhupinder Aspen DO

 

                    2015            Nurse visit         Bhupinder Aspen DO

 

                    3/23/2015           Office visit        Bhupinder Aspen DO

 

                    10/16/2014          Office visit        Bhupinder Aspen DO

 

                    2014           Nurse visit         Radha GREEN

 

                    7/10/2014           Nurse visit         Bhupinder Aspen DO

 

                    2014           Office visit        Bhupinder Aspen DO

 

                    2014           Nurse visit         Bhupinder Aspen DO

 

                    3/6/2014            Nurse visit         Bhupinder Aspen DO

 

                    2014            Yasmine Lopez MD

 

                    2013          Nurse visit         Bhupinder Aspen DO

 

                    2013           Nurse visit         Bhupinder Aspen DO

 

                    2013            Office visit        Bhupinder Aspen DO

 

                    2013            Nurse visit         Bhupinder Aspen DO

 

                    2013            Nurse visit         Bhupinder Aspen DO

 

                    2013            Office visit        Bhupinder Aspen DO

 

                    4/3/2013            Nurse visit         Bhupinder Aspen DO

 

                    2013            Office visit        Bhupinder Aspen DO

 

                    10/16/2012          Nurse visit         Bhupinder Aspen DO

 

                    2012            Nurse visit         Bhupinder Aspen DO

 

                    2012            Voided              Bhupinder Aspen DO

 

                    2012            Nurse visit         Bhupinder Aspen DO

 

                    2012            Nurse visit         Bhupinder Aspen DO

 

                    2012           Nurse visit         Bhupinder Aspen DO

 

                    5/3/2012            Nurse visit         Bhupinder Aspen DO

 

                    2012           Nurse visit         Bhupinder Aspen DO

 

                    2012           Nurse visit         Bhupinder Aspen DO

 

                    2012           Nurse visit         Bhupinder Aspen DO

 

                    2011           Nurse visit         Bhupinder Aspen DO

 

                    10/20/2011          Nurse visit         Bhupinder Aspen DO

 

                    2011           Office visit        Bhupinder Aspen DO

 

                    2011           Nurse visit         Radha GREEN

 

                    2011            Office visit        Bhupinder Aspen DO

 

                    2011           Nurse visit         Bhupinder Aspen DO

 

                    2011            Office visit        Bhupinder Aspen DO

 

                    2011           Office visit        Bhupinder Aspen DO

 

                    5/10/2011           Office visit        Bhupinder Aspen DO

 

                    2011           Office visit        Bhupinder Aspen DO

 

                    4/15/2011           Office visit        Devin Angel DO

 

                    2011           Office visit        Devin Angel DO

 

                    10/15/2010          Office visit        Devin Angel DO

 

                    2010           Office visit        Devin Angel DO

 

                    2010            Office visit        Devin Angel DO

 

                    2010           Office visit        Devin Angel DO

 

                    2010            Office visit        Devin Angel DO

 

                    3/8/2010            Office visit        Devin Masterson MD

 

                    2010            Surgery             Devin Masterson MD

 

                    2010            Office visit        Devin Angel DO

 

                    2010           Surgery             Devin Masterson MD

 

                    2010           Hospital            Devin Masterson MD

 

                    2010           Hospital            Devin Masterson MD

 

                    10/22/2009          Office visit        Devin Angel DO

## 2019-06-26 NOTE — XMS REPORT
MU2 Ambulatory Summary

                             Created on: 2017



Pauline Gan

External Reference #: [id number]

: 1950

Sex: Female



Demographics







                          Address                   [address]

[place]  [id number]

 

                          Home Phone                [phone]

 

                          Preferred Language        English

 

                          Marital Status            Legally 

 

                          Hinduism Affiliation     Unknown

 

                          Race                      White

 

                          Ethnic Group              Not  or 





Author







                          Author                    Bhupinder Louise

 

                          [facility]

 

                          Address                   [address]

[place]  [id number]



 

                          Phone                     [phone]







Care Team Providers







                    Care Team Member Name    Role                Phone

 

                    Bhupinder Louise    PCP                 Unavailable

 

                    Bhupinder Louise    PreferredProvider    Unavailable







Allergies and Adverse Reactions







                    Name                Reaction            Notes

 

                    NO KNOWN DRUG ALLERGIES                         







Plan of Treatment







             Planned Activity    Comments     Planned Date    Planned Time    Plan/Goal

 

             Injection,Subcutaneous/Intramuscul, [facility]                 2017    12:00 AM      

 

             [facility]                       2017    12:00 AM      







Medications







                                        Active 

 

             Name         Start Date    Estimated Completion Date    SIG          Comments

 

                Latuda 20 mg oral tablet                                    take 1 tablet (20 mg) by oral route once daily with

 food (at least 350 calories)            

 

             pravastatin 40 mg oral tablet    3/30/2015                 TAKE 1 TABLET BY MOUTH DAILY     

 

                Namenda XR 28 mg oral capsule,sprinkle,ER 24hr    2015                       take 1 capsule (28

 mg) by oral route once daily            

 

                Namenda XR 28 mg oral capsule,sprinkle,ER 24hr    2016                       take 1 capsule (28

 mg) by oral route once daily            

 

                potassium chloride 10 mEq oral tablet extended release    2016                       take 1 tablet

 (10 meq) by oral route once daily       

 

             pravastatin 40 mg oral tablet    2016                 TAKE 1 TABLET BY MOUTH DAILY     

 

                Vitamin B-12 1,000 mcg/mL injection solution    2016                       inject 1 milliliter 

(1,000 mcg) by intramuscular route once a month     

 

                potassium chloride 10 mEq oral tablet extended release    2016                      take 1 tablet

 (10 meq) by oral route once daily       

 

                Namenda XR 28 mg oral capsule,sprinkle,ER 24hr    2016                      TAKE 1 CAPSULE BY

 MOUTH EVERY DAY                         

 

                furosemide 40 mg oral tablet    2016                      take 1 tablet (40 mg) by oral route

 once daily                              

 

                mirtazapine 45 mg oral tablet                                    take 1 tablet (45 mg) by oral route once daily

 before bedtime                          

 

             Fish Oil 300-1,000 mg oral capsule                              take 1 capsule by oral route daily     

 

             Vitamin D3 1,000 unit oral tablet                              take 1 tablet by oral route daily     

 

                Calcium 600 600 mg calcium (1,500 mg) oral tablet                                    take 1 tablet by oral route

 daily                                   

 

                Aspirin Low Dose 81 mg oral tablet,delayed release (DR/EC)                                    take 1 tablet 

(81 mg) by oral route once daily         

 

                metoclopramide HCl 10 mg oral tablet    2017                       take 1 tablet by oral route 

2 times a day for 50 days                

 

             furosemide 40 mg oral tablet    2017                 TAKE 1 TABLET BY MOUTH DAILY     

 

                Namenda XR 28 mg oral capsule,sprinkle,ER 24hr    2017                       TAKE 1 CAPSULE BY 

MOUTH EVERY DAY                          

 

                Linzess 72 mcg oral capsule    2017                       take 1 capsule (72 mcg) by oral route

 once daily on an empty stomach at least 30 minutes before 1st meal of the day     



 

             pravastatin 40 mg oral tablet    2017                 TAKE 1 TABLET BY MOUTH DAILY     

 

                potassium chloride 10 mEq oral tablet extended release    2017                       TAKE 2 TABLETs

 BY MOUTH twice DAILY for one week       









                                         

 

             Name         Start Date    Expiration Date    SIG          Comments

 

             Reglan 10mg    3/29/2010    2010    one ac and hs     

 

                Keflex 500 mg oral capsule    2010       10/1/2010       take 1 capsule (500 mg) by oral

 route every 6 hours for 10 days         

 

                Bactrim -160 mg oral tablet    2011       take 1 tablet by oral route

 every 12 hours for 7 days               

 

                triamcinolone acetonide 0.1 % topical cream    2011      apply a thin

 layer to the affected area(s) by topical route 2 times per day     

 

                sertraline 100 mg oral tablet    4/10/2012       5/10/2012       take 1.5 tablets by oral route

 daily for 30 days                       

 

                ergocalciferol (vitamin D2) 50,000 unit oral capsule    4/15/2013       2013       TAKE

 ONE CAPSULE BY MOUTH ONCE A WEEK        

 

                CYANOCOBALAM 1000MCGINJ 1000 milliliter    2013       INJECT 1ML INTRAMUSCULAR

 ONCE A MONTH                            

 

                pravastatin 40 mg oral tablet    3/25/2014       3/20/2015       TAKE ONE TABLET BY MOUTH EVERY

 DAY                                     

 

                          Zostavax (PF) 19,400 unit/0.65 mL subcutaneous suspension for reconstitution    3/23/2015

                    3/24/2015           inject 0.65 milliliter by subcutaneous route once     

 

                famciclovir 500 mg oral tablet    12/3/2015       12/10/2015      take 1 tablet (500 mg) by

 oral route every 8 hours for 7 days     

 

                furosemide 40 mg oral tablet    2016      take 1 tablet (40 mg) by oral

 route once daily                        

 

                Cipro 500 mg oral tablet    2016       take 1 tablet (500 mg) by oral route

 2 times per day for 5 days              

 

                Bactrim -160 mg oral tablet    2016        take 1 tablet by oral route

 every 12 hours for 7 days               

 

                metoclopramide HCl 10 mg oral tablet    2017       take 1 tablet by oral

 route 2 times a day for 50 days         

 

                Macrobid 100 mg oral capsule    2017       take 1 capsule (100 mg) by oral

 route 2 times per day with food for 7 days     

 

                Augmentin 875-125 mg oral tablet    2017       take 1 tablet by oral route

 every 12 hours for 7 days               

 

                amoxicillin 500 mg oral tablet    2017       take 1 tablet (500 mg) by oral

 route every 12 hours for 7 days         

 

                cefuroxime axetil 500 mg oral tablet    2017       take 1 tablet (500 mg)

 by oral route 2 times per day for 7 days     

 

                ciprofloxacin HCl 500 mg oral tablet    2017       take 1 tablet (500 mg)

 by oral route every 12 hours for 5 days     









                                        Discontinued 

 

             Name         Start Date    Discontinued Date    SIG          Comments

 

                Tylenol 325 mg oral tablet                    2013        take 1 - 2 tablets (325 -650 mg) by oral

 route every 4-6 hours as needed         

 

                Calcium 600 + D(3) 600 mg(1,500mg) -400 unit oral tablet                    2011       take 1 tablet

 by oral route 2 times a day            no longer taking

 

                Vitamin B-12 1,000 mcg oral tablet extended release    2010       take 1

 tablet by oral route daily             no longer taking

 

                Antifungal (clotrimazole) 1 % topical cream    2010       apply to the 

affected and surrounding areas of skin by topical route 2 times per day morning 
and evening                              

 

                sertraline 100 mg oral tablet    5/10/2011       2011       take 2 tablets (200 mg) by 

oral route once daily                   discontinued by Dr. Serrano

 

                mirtazapine 15 mg oral tablet                    2011        take 1 tablet (15 mg) by oral route 

once daily before bedtime               Dr. Serrano

 

                mirtazapine 15 mg oral tablet                    2011        take 1 tablet (15 mg) by oral route 

once daily before bedtime               dc'd by Dr. Serrano

 

                Pristiq 50 mg oral tablet extended release 24 hr                    2013        take 1 tablet (50

 mg) by oral route once daily           Dr. Serrano

 

                Pristiq 50 mg oral tablet extended release 24 hr                    2013        take 1 tablet (50

 mg) by oral route once daily           dose updated

 

                Vitamin B-12 1,000 mcg/mL injection solution    2011        inject 1 milliliter

 (1,000 mcg) by intramuscular route once a month    on list already

 

                    syringe with needle 1 mL 25 gauge x 1" miscellaneous syringe    2011

                          use for injection once a month     

 

                clotrimazole 1 % topical cream    2011        apply to the affected and surrounding

 areas of skin by topical route 2 times per day in the morning and evening     

 

                Vitamin D2 50,000 unit oral capsule    2011        take 1 capsule (50,000

 unit) by oral route once weekly        generic on list

 

                Pravachol 40 mg oral tablet    2012        take 1 tablet (40 mg) by oral 

route once daily for 90 days            generic on list

 

                lithium carbonate 300 mg oral capsule    2012        take 1 capsule by oral

 route daily                            dose updated

 

                Pristiq 100 mg oral tablet extended release 24 hr                    4/10/2012       take 1 and 1/2 

tablet (150 mg) by oral route once daily    Mental Health provider

 

                Pristiq 100 mg oral tablet extended release 24 hr                    4/10/2012       take 1 and 1/2 

tablet (150 mg) by oral route once daily    Discontinued by Dr Efrain Knight at [facility]

 

                hydroxyzine HCl 50 mg oral tablet    10/16/2014      2015       take 1 tablet (50 mg) 

by oral route at bedtime                 

 

                lithium carbonate 300 mg oral capsule    2015       take 1 capsule (300

 mg) by oral route 2 for 30 days         

 

                fluconazole 100 mg oral tablet    2015       12/3/2015       take 1 tablet (100 mg) by 

oral route once a week                   

 

                ketoconazole 2 % topical cream    2015       12/3/2015       apply to the affected area(s)

 by topical route 2 times per day        

 

                prednisone 10 mg oral tablet    12/3/2015       2016        take 2 tablets (20 mg) by oral

 route once daily for 4 days 1 tablet daily for 4 days 0.5 tablet daily for 4 
days                                     

 

                triamcinolone acetonide 0.1 % topical cream    2016       apply a thin layer

 to the affected area(s) by topical route 2 times per day     

 

                Cipro 500 mg oral tablet    1/15/2017       2017       take 1 tablet (500 mg) by oral route

 every 12 hours for 10 days              







Problem List







                    Description         Status              Onset

 

                    Artificial opening status; colostomy    Active               

 

                    Bipolar disorder, unspecified    Active               

 

                    Hyperlipidemia      Active               

 

                    Peritoneal Neoplasm, Malignant    Active               

 

                    Anemia, Pernicious    Active               

 

                    Arthritis unspecified    Active               

 

                    B12 deficiency      Active               







Vital Signs







      Date    Time    BP-Sys(mm[Hg]    BP-Lynn(mm[Hg])    HR(bpm)    RR(rpm)    Temp    WT    HT    HC    BMI

                    BSA                 BMI Percentile      O2 Sat(%)

 

        2017    1:34:00 PM    118 mmHg    62 mmHg    122 bpm    18 rpm    97.8 F    161.375 lbs    

69 in                     23.83 kg/m2    1.89 m2                   96 %

 

        2017    3:05:00 PM    134 mmHg    70 mmHg    70 bpm    20 rpm    97.4 F    181 lbs    69 in

                          26.7288 kg/m    1.9992 m                 98 %

 

        2017    11:07:00 AM    124 mmHg    64 mmHg    62 bpm    17 rpm    98.2 F    181.2 lbs    69

 in                       26.76 kg/m2    2.00 m2                   98 %

 

        1/15/2017    3:34:00 PM    148 mmHg    89 mmHg    69 bpm    20 rpm    98.2 F    179 lbs    69 in

                          26.4334 kg/m    1.9882 m                 98 %

 

       2017    1:51:00 PM    160 mmHg    90 mmHg    100 bpm    20 rpm    96.5 F    179 lbs             

                                                                98 %

 

       2016    3:11:00 PM    134 mmHg    76 mmHg    80 bpm    20 rpm    98 F    163 lbs    69 in     

                24.0706 kg/m    1.8972 m                      98 %

 

        2016    2:04:00 PM    142 mmHg    86 mmHg    68 bpm    16 rpm    98.5 F    166 lbs    63 in

                          29.41 kg/m2    1.83 m2                   100 %

 

        2016    11:27:00 AM    148 mmHg    78 mmHg    90 bpm    20 rpm    98.2 F    153 lbs    69 in

                          22.5939 kg/m    1.8381 m                 96 %

 

        12/3/2015    9:50:00 AM    132 mmHg    70 mmHg    62 bpm    16 rpm    97.9 F    145 lbs    69 in

                          21.41 kg/m2    1.79 m2                   100 %

 

        2015    8:52:00 AM    132 mmHg    68 mmHg    52 bpm    20 rpm    97.8 F    141 lbs    69 in

                          20.8218 kg/m    1.7645 m                 100 %

 

        2015    3:25:00 PM    120 mmHg    62 mmHg    72 bpm    16 rpm    98.1 F    136 lbs    69 in

                          20.08 kg/m2    1.73 m2                   98 %

 

       3/23/2015    2:55:00 PM    130 mmHg    76 mmHg    68 bpm    18 rpm    97 F    140 lbs    69 in    

                20.6742 kg/m    1.7583 m                      98 %

 

        10/16/2014    11:11:00 AM    120 mmHg    66 mmHg    77 bpm    20 rpm    98 F    130 lbs    69 in

                          19.20 kg/m2    1.69 m2                   100 %

 

        2014    3:21:00 PM    130 mmHg    66 mmHg    63 bpm    18 rpm    97.2 F    160 lbs    69 in

                          23.6276 kg/m    1.8797 m                 99 %

 

        2013    10:35:00 AM    132 mmHg    70 mmHg    66 bpm    20 rpm    98.1 F    157 lbs    69 in

                          23.18 kg/m2    1.86 m2                    

 

        2013    1:29:00 PM    132 mmHg    70 mmHg    76 bpm    18 rpm    98.2 F    166 lbs    69 in 

                          24.5137 kg/m    1.9146 m                  

 

       2013    2:46:00 PM    128 mmHg    70 mmHg    76 bpm    16 rpm    98 F    160 lbs    69 in     

                23.63 kg/m2     1.88 m2                          

 

        2011    8:49:00 AM    128 mmHg    78 mmHg    70 bpm    18 rpm    97.9 F    164 lbs    69 in

                          24.2183 kg/m    1.903 m                  

 

     2011    1:31:00 PM    132 mmHg    68 mmHg    84 bpm         97 F    167 lbs                        

                                         

 

        2011    9:09:00 AM    128 mmHg    70 mmHg    72 bpm    18 rpm    98.2 F    163 lbs    64 in 

                          27.9786 kg/m    1.8272 m                  

 

       2011    10:01:00 AM    132 mmHg    70 mmHg    72 bpm    18 rpm    98.2 F    154 lbs             

                                                                 

 

       2011    2:47:00 PM    128 mmHg    70 mmHg    72 bpm    18 rpm    97.8 F    156 lbs             

                                                                 

 

       5/10/2011    3:16:00 PM    144 mmHg    80 mmHg    72 bpm    18 rpm    98.2 F    158 lbs             

                                                                 

 

        2011    10:11:00 AM    132 mmHg    70 mmHg    70 bpm    18 rpm    98.2 F    168 lbs    69 in

                          24.809 kg/m    1.9261 m                  

 

        4/15/2011    10:52:00 AM    110 mmHg    60 mmHg    75 bpm    16 rpm    97.5 F    172.375 lbs    

69 in                     25.46 kg/m2    1.95 m2                   100 %

 

        2011    11:43:00 AM    120 mmHg    82 mmHg    75 bpm    16 rpm    97.2 F    178.5 lbs    69

 in                       26.3596 kg/m    1.9854 m                 100 %

 

        10/15/2010    1:32:00 PM    120 mmHg    70 mmHg    80 bpm    18 rpm    96.6 F    177 lbs    69 in

                          26.14 kg/m2    1.98 m2                   100 %

 

        2010    3:50:00 PM    168 mmHg    100 mmHg    82 bpm    18 rpm    97.8 F    177.5 lbs    69

 in                       26.2119 kg/m    1.9798 m                 97 %

 

        2010    1:21:00 PM    140 mmHg    80 mmHg    59 bpm    16 rpm    97.6 F    173.25 lbs    69 

in                        25.58 kg/m2    1.96 m2                   100 %

 

        2010    3:02:00 PM    140 mmHg    80 mmHg    61 bpm    16 rpm    97.6 F    173.125 lbs    69

 in                       25.5658 kg/m    1.9553 m                 99 %

 

        2010    1:23:00 PM    130 mmHg    80 mmHg    66 bpm    16 rpm    96.8 F    173 lbs    69 in 

                          25.55 kg/m2    1.95 m2                   100 %

 

        2010    12:58:00 PM    130 mmHg    88 mmHg    75 bpm    16 rpm    98.4 F    172.25 lbs    69

 in                       25.4366 kg/m    1.9503 m                 100 %







Social History







                    Name                Description         Comments

 

                    denies alcohol use                         

 

                    denies smoking                           

 

                    Denies illicit substance abuse                         

 

                    retired                                 direct care

 

                    Single                                   

 

                    Exercises regularly                         

 

                    Attended some college                         

 

                    Tobacco             Never smoker         







History of Procedures







                    Date Ordered        Description         Order Status

 

                    2010 12:00 AM    COMPREHEN METABOLIC PANEL    Reviewed

 

                    2010 12:00 AM    COMPLETE CBC W/AUTO DIFF WBC    Reviewed

 

                    2010 12:00 AM    LIPID PANEL         Reviewed

 

                          2015 12:00 AM        B12 Injection, Up to 1000 Mcg NDC#1219-2590-92 [facility] 

                                        Reviewed

 

                    2011 12:00 AM    MAMMOGRAM SCREENING    Reviewed

 

                    2011 12:00 AM    CYTOPATH C/V THIN LAYER    Reviewed

 

                    2011 12:00 AM    B12 Injection 1 cc NDC#[address]-92    Reviewed

 

                    2015 12:00 AM    THER/PROPH/DIAG INJ SC/IM    Reviewed

 

                    2015 12:00 AM    B12 Injection, Up to 1000 Mcg NDC#7031-5002-04    Reviewed

 

                    2011 12:00 AM    THER/PROPH/DIAG INJ SC/IM    Reviewed

 

                    2011 12:00 AM    B12 Injection([place]) Ndc#9974-6767-92-    Reviewed

 

                    2015 12:00 AM    THER/PROPH/DIAG INJ SC/IM    Reviewed

 

                    2015 12:00 AM    B12 Injection, Up to 1000 Mcg NDC#3272-5071-77    Reviewed

 

                    10/20/2011 12:00 AM    THER/PROPH/DIAG INJ SC/IM    Reviewed

 

                    10/20/2011 12:00 AM    B12 Injection([place]) Ndc#1751-6764-01-    Reviewed

 

                    2016 12:00 AM    THER/PROPH/DIAG INJ SC/IM    Reviewed

 

                    2016 12:00 AM    B12 Injection, Up to 1000 Mcg NDC#2047-2427-00    Reviewed

 

                    3/14/2016 12:00 AM    VITAMIN B-12        Reviewed

 

                    3/15/2016 12:00 AM    THER/PROPH/DIAG INJ SC/IM    Reviewed

 

                    3/15/2016 12:00 AM    B12 Injection, Up to 1000 Mcg NDC#3853-0776-63    Reviewed

 

                    2011 12:00 AM    ***Immunization administration, Medicare flu    Reviewed

 

                    2011 12:00 AM    Fluzone ** MEDICARE Only **    Reviewed

 

                    2011 12:00 AM    THER/PROPH/DIAG INJ SC/IM    Reviewed

 

                    2011 12:00 AM    B12 Injection ([employer]) Ndc#3784-3696-18    Reviewed

 

                    2016 12:00 AM    B12 Injection, Up to 1000 Mcg NDC#9575-8298-36 [facility]    

Reviewed

 

                    2016 12:00 AM    TTE W/DOPPLER COMPLETE    Reviewed

 

                    2016 12:00 AM    EXTREMITY STUDY     Reviewed

 

                          2016 12:00 AM        B12 Injection, Up to 1000 Mcg NDC#5839-8465-96 [facility] 

                                        Reviewed

 

                    2016 12:00 AM    THER/PROPH/DIAG INJ SC/IM    Reviewed

 

                    2016 12:00 AM    B12 Injection, Up to 1000 Mcg NDC#9623-3489-76    Reviewed

 

                    2016 12:00 AM    THER/PROPH/DIAG INJ SC/IM    Reviewed

 

                    2012 12:00 AM    B12 Injection([place]) Ndc#7535-6382-64-    Reviewed

 

                    2016 12:00 AM    B12 Injection, Up to 1000 Mcg NDC#3780-4161-43    Reviewed

 

                    2016 12:00 AM    THER/PROPH/DIAG INJ SC/IM    Reviewed

 

                    2012 12:00 AM    THER/PROPH/DIAG INJ SC/IM    Reviewed

 

                    2012 12:00 AM    B12 Injection ([employer]) Ndc#6562-5008-43    Reviewed

 

                    2016 12:00 AM    THER/PROPH/DIAG INJ SC/IM    Reviewed

 

                    2016 12:00 AM    B12 Injection, Up to 1000 Mcg NDC#9075-3890-00    Reviewed

 

                    2016 12:00 AM    B12 Injection, Up to 1000 Mcg NDC#8436-4477-37    Reviewed

 

                    2016 12:00 AM    THER/PROPH/DIAG INJ SC/IM    Reviewed

 

                    2012 12:00 AM    THER/PROPH/DIAG INJ SC/IM    Reviewed

 

                    2012 12:00 AM    B12 Injection([place]ella) Ndc#[id number]-    Reviewed

 

                    12/15/2016 12:00 AM    B12 Injection, Up to 1000 Mcg NDC#9332-6655-65    Reviewed

 

                    12/15/2016 12:00 AM    THER/PROPH/DIAG INJ SC/IM    Reviewed

 

                    2016 12:00 AM    URNLS DIP STICK/TABLET RGNT AUTO W/O MICROSCOPY    Reviewed

 

                    1/3/2017 12:00 AM    URNLS DIP STICK/TABLET RGNT AUTO W/O MICROSCOPY    Reviewed

 

                    2017 12:00 AM    URINE CULTURE/COLONY COUNT    Reviewed

 

                    2017 12:00 AM    Rocephin 1 gram Injection, [facility]    Reviewed

 

                    2017 12:00 AM    THERAPEUTIC PROPHYLACTIC/DX INJECTION SUBQ/IM    Reviewed

 

                    2017 12:00 AM    B12 1000mcg Injection, [facility]    Reviewed

 

                    5/3/2012 12:00 AM    THER/PROPH/DIAG INJ SC/IM    Reviewed

 

                    5/3/2012 12:00 AM    B12 Injection([facility]) Ndc#4860-2875-59-    Reviewed

 

                    2017 12:00 AM    THERAPEUTIC PROPHYLACTIC/DX INJECTION SUBQ/IM    Reviewed

 

                    2017 12:00 AM    B12 1000mcg Injection, [facility] Medicare    Reviewed

 

                    2017 12:00 AM    MRI BRAIN STEM W/O & W/DYE    Reviewed

 

                    2017 12:00 AM    VITAMIN B-12        Reviewed

 

                    2017 12:00 AM    Speech Therapy Consult    Reviewed

 

                    2017 12:00 AM    ASSAY OF CREATININE    Reviewed

 

                    2012 12:00 AM    IMMUNOTHERAPY INJECTIONS    Reviewed

 

                    2012 12:00 AM    B12 Injection([facility]) Ndc#4208-9168-40-    Reviewed

 

                    2017 12:00 AM    URINALYSIS AUTO W/SCOPE    Reviewed

 

                    2012 12:00 AM    THER/PROPH/DIAG INJ SC/IM    Reviewed

 

                    2012 12:00 AM    B12 Injection, Up to 1000 Mcg NDC#9831-3818-19    Reviewed

 

                    2017 12:00 AM    URINALYSIS AUTO W/SCOPE    Reviewed

 

                    2017 2:18 PM    URINALYSIS AUTO W/O SCOPE    Reviewed

 

                    2017 12:00 AM    URINE CULTURE/COLONY COUNT    Reviewed

 

                    2017 12:00 AM    B12 1000mcg Injection    Reviewed

 

                    2017 12:00 AM    URNLS DIP STICK/TABLET RGNT AUTO W/O MICROSCOPY    Returned

 

                    2012 12:00 AM    THER/PROPH/DIAG INJ SC/IM    Reviewed

 

                    2012 12:00 AM    B12 Injection, Up to 1000 Mcg NDC#0771-5489-31    Reviewed

 

                    2012 12:00 AM    THER/PROPH/DIAG INJ SC/IM    Reviewed

 

                    2012 12:00 AM    B12 Injection, Up to 1000 Mcg NDC#2849-9808-48    Reviewed

 

                    10/16/2012 12:00 AM    THER/PROPH/DIAG INJ SC/IM    Reviewed

 

                    10/16/2012 12:00 AM    B12 Injection, Up to 1000 Mcg NDC#8462-9070-00    Reviewed

 

                    2010 12:00 AM    COMPREHEN METABOLIC PANEL    Reviewed

 

                    2010 12:00 AM    COMPLETE CBC W/AUTO DIFF WBC    Reviewed

 

                    2010 12:00 AM    LIPID PANEL         Reviewed

 

                    2013 12:00 AM    Flu Injection 3 Years And Above NDC# [address]-34  [facility]    Reviewed



 

                    2013 12:00 AM    COMPLETE CBC W/AUTO DIFF WBC    Reviewed

 

                    2013 12:00 AM    ASSAY OF LITHIUM    Reviewed

 

                    2013 12:00 AM    METABOLIC PANEL TOTAL CA    Reviewed

 

                    4/3/2013 12:00 AM    THER/PROPH/DIAG INJ SC/IM    Reviewed

 

                    4/3/2013 12:00 AM    B12 Injection, Up to 1000 Mcg NDC#1307-1284-89    Reviewed

 

                    2013 12:00 AM    THER/PROPH/DIAG INJ SC/IM    Reviewed

 

                    2013 12:00 AM    B12 Injection, Up to 1000 Mcg NDC#3252-1895-74    Reviewed

 

                    2013 12:00 AM    THER/PROPH/DIAG INJ SC/IM    Reviewed

 

                    2013 12:00 AM    B12 Injection, Up to 1000 Mcg NDC#3081-3421-44    Reviewed

 

                    2013 12:00 AM    LIPID PANEL         Reviewed

 

                    2013 12:00 AM    VITAMIN D 25 HYDROXY    Reviewed

 

                    2013 12:00 AM    THER/PROPH/DIAG INJ SC/IM    Reviewed

 

                    2013 12:00 AM    B12 Injection, Up to 1000 Mcg NDC#0377-3456-28    Reviewed

 

                    2013 12:00 AM    THER/PROPH/DIAG INJ SC/IM    Reviewed

 

                    3/6/2014 12:00 AM    THER/PROPH/DIAG INJ SC/IM    Reviewed

 

                    2014 12:00 AM    THER/PROPH/DIAG INJ SC/IM    Reviewed

 

                    2014 12:00 AM    B12 Injection, Up to 1000 Mcg NDC#3468-3058-50    Reviewed

 

                    2010 12:00 AM    SKIN FUNGI CULTURE    Reviewed

 

                    10/9/2010 12:00 AM    COMPREHEN METABOLIC PANEL    Reviewed

 

                    10/9/2010 12:00 AM    LIPID PANEL         Reviewed

 

                    2010 12:00 AM    THER/PROPH/DIAG INJ SC/IM    Reviewed

 

                    2010 12:00 AM    B12 Injection Ndc#[address]-84 ([facility])    Reviewed

 

                    2010 12:00 AM    THER/PROPH/DIAG INJ SC/IM    Reviewed

 

                    2010 12:00 AM    Kenalog 40 Mg Im-Nd#[address]-86 ([facility])    Reviewed

 

                    10/15/2010 12:00 AM    FLU VACCINE 3 YRS & > IM    Reviewed

 

                    10/15/2010 12:00 AM    Admin.Of M/C Cov.Vaccine-Flu Vacc.    Reviewed

 

                    1/15/2011 12:00 AM    COMPLETE CBC W/AUTO DIFF WBC    Reviewed

 

                    1/15/2011 12:00 AM    COMPREHEN METABOLIC PANEL    Reviewed

 

                    1/15/2011 12:00 AM    LIPID PANEL         Reviewed

 

                    2014 12:00 AM    MAMMOGRAM SCREENING    Reviewed

 

                    2014 12:00 AM    Screening mammography, bilateral    Reviewed

 

                    7/10/2014 12:00 AM    THER/PROPH/DIAG INJ SC/IM    Reviewed

 

                    7/10/2014 12:00 AM    B12 Injection, Up to 1000 Mcg NDC#9447-9153-40    Reviewed

 

                    2011 12:00 AM    COMPLETE CBC W/AUTO DIFF WBC    Reviewed

 

                    2011 12:00 AM    COMPREHEN METABOLIC PANEL    Reviewed

 

                    2011 12:00 AM    LIPID PANEL         Reviewed

 

                    2014 12:00 AM    B12 Injection, Up to 1000 Mcg NDC#9880-7971-60    Reviewed

 

                    10/19/2014 12:00 AM    MAMMOGRAM SCREENING    Reviewed

 

                    10/19/2014 12:00 AM    Screening mammography, bilateral    Reviewed

 

                    10/16/2014 12:00 AM    B12 Injection, Up to 1000 Mcg NDC#6838-8756-37    Reviewed

 

                    10/16/2014 12:00 AM    COMPLETE CBC W/AUTO DIFF WBC    Reviewed

 

                    10/16/2014 12:00 AM    COMPREHEN METABOLIC PANEL    Reviewed

 

                    10/16/2014 12:00 AM    IMMUNOASSAY TUMOR     Reviewed

 

                    10/16/2014 12:00 AM    LIPID PANEL         Reviewed

 

                    10/16/2014 12:00 AM    ASSAY OF LITHIUM    Reviewed

 

                    10/16/2014 12:00 AM    MAMMOGRAM SCREENING    Reviewed

 

                    2011 12:00 AM    ASSAY OF PARATHORMONE    Reviewed

 

                    2011 12:00 AM    VITAMIN D 25 HYDROXY    Reviewed

 

                    2011 12:00 AM    ASSAY OF LITHIUM    Reviewed

 

                    2011 12:00 AM    METABOLIC PANEL TOTAL CA    Reviewed

 

                    2011 12:00 AM    CT HEAD/BRAIN W/O & W/DYE    Reviewed

 

                    3/23/2015 12:00 AM    PNEUMOCOCCAL VACC 13 GLENDY IM    Reviewed

 

                    3/23/2015 12:00 AM    Vitamin B12 injection    Reviewed

 

                    2011 12:00 AM    ASSAY OF LITHIUM    Reviewed

 

                    2011 12:00 AM    B12 Injection Ndc#[address]-99  [place]    Reviewed

 

                    2015 12:00 AM    THER/PROPH/DIAG INJ SC/IM    Reviewed

 

                    2015 12:00 AM    B12 Injection, Up to 1000 Mcg NDC#2193-8737-05    Reviewed

 

                    2015 12:00 AM    COMPLETE CBC W/AUTO DIFF WBC    Reviewed

 

                    2015 12:00 AM    COMPREHEN METABOLIC PANEL    Reviewed

 

                    2015 12:00 AM    LIPID PANEL         Reviewed

 

                    2015 12:00 AM    ASSAY OF LITHIUM    Reviewed

 

                    2011 12:00 AM    VIT D 1 25-DIHYDROXY    Reviewed

 

                    2011 12:00 AM    VITAMIN B-12        Reviewed

 

                    2015 12:00 AM    B12 Injection, Up to 1000 Mcg NDC#0877-2958-99    Reviewed

 

                    2015 12:00 AM    THER/PROPH/DIAG INJ SC/IM    Reviewed

 

                    2015 12:00 AM    B12 Injection, Up to 1000 Mcg NDC#6277-9781-01    Reviewed

 

                    2011 12:00 AM    THER/PROPH/DIAG INJ SC/IM    Reviewed

 

                    2011 12:00 AM    B12 Injection ([employer]) Ndc#9161-3167-88    Reviewed

 

                    2015 12:00 AM    THER/PROPH/DIAG INJ SC/IM    Reviewed

 

                    2015 12:00 AM    B12 Injection, Up to 1000 Mcg NDC#0145-5366-83    Reviewed







Results Summary







                          Date and Description      Results

 

                          2004 12:00 AM        Colonoscopy-Women and Men over 50 Normal 

 

                          2008 12:00 AM         Pap Smear Declined 

 

                          10/7/2009 12:00 AM        Cholest Cry Stone Ql .0 %LDLc SerPl-mCnc 123.0 mg/dLHDLc

 SerPl-mCnc 34.0 mg/dLTrigl SerPl-mCnc 190.0 mg/dLGlucose SerPl-mCnc 78.0 mg/dL

 

                          2009 12:00 AM        Mammogram -Women over 40 Normal HIV1+2 Ab Ser Ql no risk 

 

                          2010 8:47 AM         Dexa Bone Scan Refused Aspirin reccommended Contraindication 



 

                          2010 8:48 AM         Depression Done 

 

                          2010 12:00 AM         Foot Exam-Diabetic Done 

 

                          2010 12:00 AM         Cholest Cry Stone Ql .0 %LDLc SerPl-mCnc 126.0 mg/dLGlucose

 SerPl-mCnc 102.0 mg/dL

 

                          2010 8:45 AM          TRIGLYCERIDES 122.0 mg/dLCHOLESTEROL 186.0 mg/dLHDL 36.0 mg/dLTOT

 CHOL/HDL 5.2 LDL (CALC) 126.0 mg/dLGLUCOSE 102.0 mg/dLSODIUM 143.0 
mmol/LPOTASSIUM 3.70 mmol/LCHLORIDE 111.0 mmol/LCO2 23.0 mmol/LBUN 10.0 
mg/dLCREATININE 0.80 mg/dLSGOT/AST 12.0 IU/LSGPT/ALT 11.0 IU/LALK PHOS 65.0 
IU/LTOTAL PROTEIN 7.20 g/dLALBUMIN 3.90 g/dLTOTAL BILI 0.50 mg/dLCALCIUM 10.20 
mg/dLAGE 59 GFR NonAA 73 GFR AA 88 eGFR >60 mL/min/1.73 m2eGFR AA* >60 WBC 5.7 
RBC 3.26 HGB 10.60 g/dLHCT 31.70 %MCV 97.0 fLMCH 32.50 pgMCHC 33.40 g/dLRDW SD 
47 RDW CV 13.30 %MPV 9.70 fLPLT 287 NRBC# 0.00 NRBC% 0.0 %NEUT 62.90 %%LYMP 
21.80 %%MONO 9.90 %%EOS 5.0 %%BASO 0.40 %#NEUT 3.56 #LYMP 1.23 #MONO 0.56 #EOS 
0.28 #BASO 0.02 MANUAL DIFF NOT IND 

 

                          2010 12:00 AM        Glucose SerPl-mCnc 96.0 mg/dLCholest Cry Stone Ql .0 %LDLc

 SerPl-mCnc 146.0 mg/dL

 

                          2010 8:26 AM         TRIGLYCERIDES 106.0 mg/dLCHOLESTEROL 199.0 mg/dLHDL 32.0 mg/dLTOT

 CHOL/HDL 6.2 LDL (CALC) 146.0 mg/dLGLUCOSE 96.0 mg/dLSODIUM 143.0 
mmol/LPOTASSIUM 4.0 mmol/LCHLORIDE 113.0 mmol/LCO2 24.0 mmol/LBUN 13.0 
mg/dLCREATININE 1.0 mg/dLSGOT/AST 11.0 IU/LSGPT/ALT 6.0 IU/LALK PHOS 56.0 
IU/LTOTAL PROTEIN 6.60 g/dLALBUMIN 3.80 g/dLTOTAL BILI 0.50 mg/dLCALCIUM 9.30 
mg/dLAGE 59 GFR NonAA 57 GFR AA 69 eGFR 57 eGFR AA* >60 

 

                          10/6/2010 12:00 AM        Cholest Cry Stone Ql .0 %LDLc SerPl-mCnc 111.0 mg/dLGlucose

 SerPl-mCnc 81.0 mg/dL

 

                          10/6/2010 2:45 PM         TRIGLYCERIDES 123.0 mg/dLCHOLESTEROL 178.0 mg/dLHDL 42.0 mg/dLTOT

 CHOL/HDL 4.2 LDL (CALC) 111.0 mg/dLGLUCOSE 81.0 mg/dLSODIUM 139.0 
mmol/LPOTASSIUM 4.10 mmol/LCHLORIDE 106.0 mmol/LCO2 24.0 mmol/LBUN 13.0 
mg/dLCREATININE 0.90 mg/dLSGOT/AST 13.0 IU/LSGPT/ALT 11.0 IU/LALK PHOS 61.0 
IU/LTOTAL PROTEIN 7.10 g/dLALBUMIN 3.90 g/dLTOTAL BILI 0.30 mg/dLCALCIUM 9.30 
mg/dLAGE 60 GFR NonAA 64 GFR AA 78 eGFR >60 mL/min/1.73 m2eGFR AA* >60 WBC 6.9 
RBC 3.59 HGB 11.50 g/dLHCT 35.30 %MCV 98.0 fLMCH 32.0 pgMCHC 32.60 g/dLRDW SD 46
 RDW CV 12.90 %MPV 9.90 fLPLT 311 NRBC# 0.00 NRBC% 0.0 %NEUT 64.90 %%LYMP 22.50 
%%MONO 7.20 %%EOS 5.10 %%BASO 0.30 %#NEUT 4.45 #LYMP 1.54 #MONO 0.49 #EOS 0.35 
#BASO 0.02 MANUAL DIFF NOT IND 

 

                          2011 12:00 AM         Mammogram -Women over 40 Ordered 

 

                          2011 10:25 AM        TRIGLYCERIDES 111.0 mg/dLCHOLESTEROL 195.0 mg/dLHDL 43.0 mg/dLTOT

 CHOL/HDL 4.5 LDL (CALC) 130.0 mg/dLWBC 5.3 RBC 3.76 HGB 12.0 g/dLHCT 37.80 %MCV
 101.0 fLMCH 31.90 pgMCHC 31.70 g/dLRDW SD 47 RDW CV 13.0 %MPV 9.70 fLPLT 259 
NRBC# 0.00 NRBC% 0.0 %NEUT 69.0 %%LYMP 17.60 %%MONO 8.30 %%EOS 4.70 %%BASO 0.40 
%#NEUT 3.63 #LYMP 0.93 #MONO 0.44 #EOS 0.25 #BASO 0.02 MANUAL DIFF NOT IND 
GLUCOSE 102.0 mg/dLSODIUM 146.0 mmol/LPOTASSIUM 4.20 mmol/LCHLORIDE 113.0 
mmol/LCO2 23.0 mmol/LBUN 15.0 mg/dLCREATININE 1.0 mg/dLSGOT/AST 12.0 
IU/LSGPT/ALT 17.0 IU/LALK PHOS 60.0 IU/LTOTAL PROTEIN 6.90 g/dLALBUMIN 4.20 
g/dLTOTAL BILI 0.40 mg/dLCALCIUM 9.70 mg/dLAGE 60 GFR NonAA 57 GFR AA 69 eGFR 57
 eGFR AA* >60 

 

                          2011 11:49 AM        Cholest Cry Stone Ql .0 %LDLc SerPl-mCnc 130.0 mg/dLHDLc

 SerPl-mCnc 43.0 mg/dLTrigl SerPl-mCnc 111.0 mg/dLGlucose SerPl-mCnc 102.0 mg/dL

 

                          2011 11:52 AM        Pap Smear Declined 

 

                          2011 11:28 AM        Lithium 2.080 mmol/LGLUCOSE 102.0 mg/dLSODIUM 135.0 mmol/LPOTASSIUM

 3.90 mmol/LCHLORIDE 106.0 mmol/LCO2 21.0 mmol/LBUN 12.0 mg/dLCREATININE 1.30 
mg/dLCALCIUM 10.70 mg/dLAGE 60 GFR NonAA 42 GFR AA 51 eGFR 42 eGFR AA* 51 

 

                          2011 8:58 AM          Lithium 0.690 mmol/L

 

                          2011 2:38 PM         VITAMIN B12 3483.0 pg/mL

 

                          2013 3:35 PM          WBC 5.1 RBC 3.73 HGB 11.70 g/dLHCT 36.40 %MCV 98.0 fLMCH 31.40

 pgMCHC 32.10 g/dLRDW SD 47 RDW CV 13.10 %MPV 9.80 fLPLT 224 NRBC# 0.00 NRBC% 
0.0 %NEUT 66.80 %%LYMP 19.10 %%MONO 9.0 %%EOS 4.90 %%BASO 0.20 %#NEUT 3.42 #LYMP
 0.98 #MONO 0.46 #EOS 0.25 #BASO 0.01 MANUAL DIFF NOT IND GLUCOSE 88.0 
mg/dLSODIUM 141.0 mmol/LPOTASSIUM 4.10 mmol/LCHLORIDE 110.0 mmol/LCO2 22.0 
mmol/LBUN 22.0 mg/dLCREATININE 1.10 mg/dLCALCIUM 9.80 mg/dLAGE 62 GFR NonAA 50 
GFR AA 61 eGFR 50 eGFR AA* 60 Lithium 0.760 mmol/L

 

                          2013 11:02 AM        TRIGLYCERIDES 106.0 mg/dLCHOLESTEROL 181.0 mg/dLHDL 46.0 mg/dLTOT

 CHOL/HDL 3.9 LDL (CALC) 114.0 mg/dLVITAMIN D 41.10 ng/mL

 

                          10/17/2014 10:10 AM       WBC 5.0 RBC 3.66 HGB 11.60 g/dLHCT 36.80 %.0 fLMCH 31.70

 pgMCHC 31.50 g/dLRDW SD 50 RDW CV 13.50 %MPV 10.10 fLPLT 209 NRBC# 0.00 NRBC% 
0.0 %NEUT 69.20 %%LYMP 21.0 %%MONO 6.40 %%EOS 3.20 %%BASO 0.20 %#NEUT 3.46 #LYMP
 1.05 #MONO 0.32 #EOS 0.16 #BASO 0.01 MANUAL DIFF NOT IND GLUCOSE 100.0 
mg/dLSODIUM 148.0 mmol/LPOTASSIUM 3.90 mmol/LCHLORIDE 114.0 mmol/LCO2 26.0 
mmol/LBUN 12.0 mg/dLCREATININE 1.20 mg/dLSGOT/AST 9.0 IU/LSGPT/ALT <6 IU/LALK 
PHOS 82.0 IU/LTOTAL PROTEIN 6.90 g/dLALBUMIN 4.0 g/dLTOTAL BILI 0.40 
mg/dLCALCIUM 10.50 mg/dLAGE 64 GFR NonAA 45 GFR AA 55 eGFR 45 eGFR AA* 55 
TRIGLYCERIDES 96.0 mg/dLCHOLESTEROL 155.0 mg/dLHDL 38.0 mg/dLTOT CHOL/HDL 4.1 
LDL (CALC) 98.0 mg/dLLithium 0.850 mmol/LCancer Antigen (CA) 125 8.30 U/mL

 

                          2015 10:25 AM        Lithium 0.790 mmol/LWBC 4.8 RBC 3.44 HGB 11.0 g/dLHCT 35.20 

%.0 fLMCH 32.0 pgMCHC 31.30 g/dLRDW SD 53 RDW CV 14.0 %MPV 9.30 fLPLT 210
 NRBC# 0.00 NRBC% 0.0 %NEUT 70.80 %%LYMP 17.20 %%MONO 8.10 %%EOS 3.50 %%BASO 
0.40 %#NEUT 3.41 #LYMP 0.83 #MONO 0.39 #EOS 0.17 #BASO 0.02 MANUAL DIFF NOT IND 
TRIGLYCERIDES 107.0 mg/dLCHOLESTEROL 174.0 mg/dLHDL 43.0 mg/dLTOT CHOL/HDL 4.0 
LDL (CALC) 110.0 mg/dLGLUCOSE 90.0 mg/dLSODIUM 145.0 mmol/LPOTASSIUM 3.80 
mmol/LCHLORIDE 115.0 mmol/LCO2 24.0 mmol/LBUN 17.0 mg/dLCREATININE 1.30 
mg/dLSGOT/AST 18.0 IU/LSGPT/ALT 17.0 IU/LALK PHOS 56.0 IU/LTOTAL PROTEIN 6.70 
g/dLALBUMIN 3.90 g/dLTOTAL BILI 0.40 mg/dLCALCIUM 9.80 mg/dLAGE 64 GFR NonAA 41 
GFR AA 50 eGFR 41 eGFR AA* 50 

 

                          2015 8:50 AM        WBC 5.8 RBC 3.29 HGB 10.70 g/dLHCT 34.0 %.0 fLMCH 32.50

 pgMCHC 31.50 g/dLRDW SD 52 RDW CV 13.60 %MPV 9.60 fLPLT 223 NRBC# 0.00 NRBC% 
0.0 %NEUT 69.60 %%LYMP 18.90 %%MONO 8.50 %%EOS 2.80 %%BASO 0.20 %#NEUT 4.03 
#LYMP 1.09 #MONO 0.49 #EOS 0.16 #BASO 0.01 MANUAL DIFF NOT IND Lithium 0.620 
mmol/LGLUCOSE 83.0 mg/dLSODIUM 139.0 mmol/LPOTASSIUM 3.90 mmol/LCHLORIDE 109.0 
mmol/LCO2 22.0 mmol/LBUN 19.0 mg/dLCREATININE 1.40 mg/dLSGOT/AST 19.0 
IU/LSGPT/ALT 21.0 IU/LALK PHOS 55.0 IU/LTOTAL PROTEIN 6.50 g/dLALBUMIN 3.90 
g/dLTOTAL BILI 0.50 mg/dLCALCIUM 9.60 mg/dLAGE 65 GFR NonAA 38 GFR AA 46 eGFR 38
 eGFR AA* 46 TRIGLYCERIDES 121.0 mg/dLCHOLESTEROL 192.0 mg/dLHDL 51.0 mg/dLTOT 
CHOL/HDL 3.8 .0 mg/dLFREE T4 0.79 TSH 1.210 uIU/mLHemoglobin A1c 5.40 
%Estim. Avg Glu (eAG) 108 

 

                          3/15/2016 8:08 AM         VITAMIN B12 696.0 pg/mL

 

                          3/23/2016 8:26 AM         WBC 7.0 RBC 3.61 HGB 11.80 g/dLHCT 37.70 %.0 fLMCH 32.70

 pgMCHC 31.30 g/dLRDW SD 49 RDW CV 12.50 %MPV 10.0 fLPLT 207 NRBC# 0.00 NRBC% 
0.0 %NEUT 73.60 %%LYMP 16.40 %%MONO 6.60 %%EOS 3.0 %%BASO 0.30 %#NEUT 5.15 #LYMP
 1.15 #MONO 0.46 #EOS 0.21 #BASO 0.02 MANUAL DIFF NOT IND Lithium 0.940 
mmol/LGLUCOSE 108.0 mg/dLSODIUM 143.0 mmol/LPOTASSIUM 4.30 mmol/LCHLORIDE 110.0 
mmol/LCO2 27.0 mmol/LBUN 16.0 mg/dLCREATININE 1.60 mg/dLSGOT/AST 13.0 
IU/LSGPT/ALT 7.0 IU/LALK PHOS 71.0 IU/LTOTAL PROTEIN 6.80 g/dLALBUMIN 4.0 
g/dLTOTAL BILI 0.20 mg/dLCALCIUM 10.40 mg/dLAGE 65 GFR NonAA 32 GFR AA 39 eGFR 
32 eGFR AA* 39 TRIGLYCERIDES 113.0 mg/dLCHOLESTEROL 169.0 mg/dLHDL 42.0 mg/dLTOT
 CHOL/HDL 4.0 LDL (CALC) 104.0 mg/dLFREE T4 0.86 TSH 2.20 uIU/mLHemoglobin A1c 
5.20 %Estim. Avg Glu (eAG) 103 

 

                          3/25/2016 9:17 AM         COLOR YELLOW APPEARANCE CLEAR SPEC GRAV 1.010 pH 7.0 PROTEIN 

NEGATIVE GLUCOSE NEGATIVE mg/dLKETONE NEGATIVE BILIRUBIN NEGATIVE BLOOD NEGATIVE
 NITRITE NEGATIVE [place] SCREEN SMALL MICRO IND? SEE BELOW WBC/HPF 0-5 RBC/HPF 
NEGATIVE CASTS/LPF NEGATIVE /LPFCRYSTALS NEGATIVE MUCOUS THRDS NEGATIVE BACTERIA
 NEGATIVE EPITH CELLS FEW SQUAMOUS /HPFTRICHOMONAS NEGATIVE YEAST NEGATIVE 

 

                          2016 6:00 AM        GLUCOSE 91.0 mg/dLSODIUM 143.0 mmol/LPOTASSIUM 3.60 mmol/LCHLORIDE

 112.0 mmol/LCO2 23.0 mmol/LBUN 22.0 mg/dLCREATININE 1.20 mg/dLSGOT/AST 15.0 
IU/LSGPT/ALT 12.0 IU/LALK PHOS 61.0 IU/LTOTAL PROTEIN 5.40 g/dLALBUMIN 3.10 
g/dLTOTAL BILI 0.40 mg/dLCALCIUM 8.40 mg/dLAGE 66 GFR NonAA 45 GFR AA 55 eGFR 45
 eGFR AA* 55 WBC 3.0 RBC 3.05 HGB 9.80 g/dLHCT 32.10 %.0 fLMCH 32.10 
pgMCHC 30.50 g/dLRDW SD 54 RDW CV 14.20 %MPV 10.10 fLPLT 170 NRBC# 0.00 NRBC% 
0.0 %NEUT 50.70 %%LYMP 32.60 %%MONO 10.50 %%EOS 5.90 %%BASO 0.30 %#NEUT 1.54 
#LYMP 0.99 #MONO 0.32 #EOS 0.18 #BASO 0.01 MANUAL DIFF NOT IND 

 

                          2016 2:09 PM        COLOR YELLOW APPEARANCE CLEAR SPEC GRAV 1.010 pH 5.0 PROTEIN

 30 GLUCOSE NEGATIVE mg/dLKETONE NEGATIVE BILIRUBIN NEGATIVE BLOOD LARGE NITRITE
 NEGATIVE [place] SCREEN MODERATE MICRO INDICATED? SEE BELOW WBC/HPF  RBC/HPF
 20-50 CASTS/LPF NEGATIVE /LPFCRYSTALS NEGATIVE MUCOUS THRDS NEGATIVE BACTERIA 
NEGATIVE EPITH CELLS FEW SQUAMOUS /HPFTRICHOMONAS NEGATIVE YEAST NEGATIVE CULT 
SET UP? YES 

 

                          1/3/2017 4:08 PM          COLOR YELLOW APPEARANCE HAZY SPEC GRAV 1.015 pH 6.0 PROTEIN 30

 GLUCOSE NEGATIVE mg/dLKETONE NEGATIVE BILIRUBIN NEGATIVE BLOOD MODERATE NITRITE
 NEGATIVE [place] SCREEN LARGE MICRO INDICATED? SEE BELOW WBC/-200 RBC/HPF 
5-10 CASTS/LPF NEGATIVE /LPFCRYSTALS NEGATIVE MUCOUS THRDS NEGATIVE BACTERIA 
NEGATIVE EPITH CELLS 1+ SQUAMOUS /HPFTRICHOMONAS NEGATIVE YEAST NEGATIVE CULT 
SET UP? YES 

 

                          2017 4:24 PM         CREATININE 1.50 mg/dLAGE 66 GFR NonAA 35 GFR AA 42 eGFR 35 eGFR

 AA* 42 VITAMIN B12 1324.0 pg/mL

 

                          2017 11:30 AM         GLUCOSE 93.0 mg/dLSODIUM 143.0 mmol/LPOTASSIUM 3.10 mmol/LCHLORIDE

 101.0 mmol/LCO2 29.0 mmol/LBUN 26.0 mg/dLCREATININE 1.50 mg/dLSGOT/AST 23.0 
IU/LSGPT/ALT 13.0 IU/LALK PHOS 66.0 IU/LTOTAL PROTEIN 7.70 g/dLALBUMIN 4.30 
g/dLTOTAL BILI 0.40 mg/dLCALCIUM 10.30 mg/dLAGE 66 GFR NonAA 35 GFR AA 42 eGFR 
35 eGFR AA* 42 TRIGLYCERIDES 147.0 mg/dLCHOLESTEROL 184.0 mg/dLHDL 44.0 mg/dLTOT
 CHOL/HDL 4.2 .0 mg/dLWBC 5.4 RBC 3.98 HGB 12.90 g/dLHCT 40.20 %.0
 fLMCH 32.40 pgMCHC 32.10 g/dLRDW SD 50 RDW CV 13.50 %MPV 9.30 fLPLT 210 NRBC# 
0.00 NRBC% 0.0 %NEUT 54.20 %%LYMP 30.70 %%MONO 9.10 %%EOS 5.20 %%BASO 0.40 
%#NEUT 2.94 #LYMP 1.66 #MONO 0.49 #EOS 0.28 #BASO 0.02 MANUAL DIFF NOT IND FREE 
T4 1.09 COLOR YELLOW APPEARANCE CLEAR SPEC GRAV <=1.005 pH 5.5 PROTEIN NEGATIVE 
GLUCOSE NEGATIVE mg/dLKETONE NEGATIVE BILIRUBIN NEGATIVE BLOOD NEGATIVE NITRITE 
NEGATIVE [place] SCREEN LARGE MICRO INDICATED? SEE BELOW WBC/HPF 10-20 RBC/HPF 0-5 
CASTS/LPF NEGATIVE /LPFCRYSTALS NEGATIVE MUCOUS THRDS NEGATIVE BACTERIA FEW 
EPITH CELLS 1+ SQUAMOUS /HPFTRICHOMONAS NEGATIVE YEAST NEGATIVE CULT SET UP? YES
 TSH 1.820 uIU/mL

 

                          2017 2:45 PM         COLOR YELLOW APPEARANCE CLEAR SPEC GRAV <=1.005 pH 6.0 PROTEIN

 NEGATIVE GLUCOSE NEGATIVE mg/dLKETONE NEGATIVE BILIRUBIN NEGATIVE BLOOD TRACE-
INTACT NITRITE NEGATIVE [place] SCREEN SMALL MICRO INDICATED? SEE BELOW WBC/HPF 0-5
 RBC/HPF NEGATIVE CASTS/LPF NEGATIVE /LPFCRYSTALS NEGATIVE MUCOUS THRDS NEGATIVE
 BACTERIA FEW EPITH CELLS FEW SQUAMOUS /HPFTRICHOMONAS NEGATIVE YEAST NEGATIVE 
CULT SET UP? YES 

 

                          2017 11:22 AM        COLOR YELLOW APPEARANCE CLEAR SPEC GRAV <=1.005 pH 6.5 PROTEIN

 NEGATIVE GLUCOSE NEGATIVE mg/dLKETONE NEGATIVE BILIRUBIN NEGATIVE BLOOD 
NEGATIVE NITRITE NEGATIVE LEUK SCREEN NEGATIVE MICRO INDICATED? NOT INDICATED 

 

                          2017 2:18 PM         Clarity Ur cloudy Color Ur dark yellow Glucose Ur-sCnc negative

 Bilirub Ur Ql Strip negative Ketones Ur Ql Strip negative Sp Gr Ur Qn 1.010 Hgb
 Ur Ql Strip trace-intact pH Ur-LsCnc 6.5 Prot Ur Ql Strip trace Urobilinogen 
Ur-mCnc 0.2 Nitrite Ur Ql Strip negative WBC Est Ur Ql Strip large 







History Of Immunizations







       Name    Date Admin    Mfg Name    Mfg Code    Trade Name    Lot#    Route    Inj    Vis Given    Vis

 Pub                                    CVX

 

        Influenza    2008    Not Entered    NE      Not Entered            Not Entered    Not Entered

                    1            999

 

        X       12/19/2008    [employer]    MSD     Pneumovax 23            Intramuscular    Not Entered

                    1            999

 

           Influenza    10/15/2010    [facility].    NOV        Fluvirin > 12 Years    

[id number]     Intramuscular    Left Deltoid    10/15/2010    2009    999

 

          X         3/23/2015    [facility]    [facility]       Prevnar 13    [id number]    Intramuscular

                Right Gluteous Medius    3/23/2015       2013       109







History of Past Illness







                    Name                Date of Onset       Comments

 

                    Peritoneal Neoplasm, Malignant                         

 

                    Hyperlipidemia                           

 

                    Bipolar disorder, unspecified                         

 

                    Artificial opening status; colostomy                         

 

                    B12 deficiency                           

 

                    Anemia, Pernicious                         

 

                    Arthritis unspecified                         

 

                    cervical cancer                          

 

                    Artificial opening status; colostomy    2010  1:10PM     

 

                    Bipolar disorder, unspecified    2010  1:10PM     

 

                    Hyperlipidemia      2010  1:10PM     

 

                    Anemia, Pernicious    2010  1:10PM     

 

                    Postoperative Follow-Up    2010  1:55PM     

 

                    Postoperative Follow-Up    Mar  8 2010 10:57AM     

 

                    Artificial opening status; colostomy    Mar  8 2010  1:19PM     

 

                    Peritoneal Neoplasm, Malignant    Mar  8 2010  1:19PM     

 

                    Artificial opening status; colostomy    2010  1:40PM     

 

                    Hyperlipidemia      2010  1:40PM     

 

                    Anemia, Pernicious    2010  1:40PM     

 

                    Peritoneal Neoplasm, Malignant    2010  1:40PM     

 

                    Arthritis unspecified    2010  1:40PM     

 

                    Anemia of Chronic Illness    2010  1:40PM     

 

                    Tinea corporis      2010  3:17PM     

 

                    Bipolar disorder, unspecified    2010  1:33PM     

 

                    Hyperlipidemia      2010  1:33PM     

 

                    Anemia, Pernicious    2010  1:33PM     

 

                    Peritoneal Neoplasm, Malignant    2010  1:33PM     

 

                    B12 deficiency      2010  1:33PM     

 

                    Ethmoidal Sinusitis, Acute    Sep 21 2010  3:53PM     

 

                    Wheezing            Sep 21 2010  3:53PM     

 

                    Flu                 Oct 15 2010  1:40PM     

 

                    Bipolar disorder, unspecified    Oct 15 2010  1:42PM     

 

                    Hyperlipidemia      Oct 15 2010  1:42PM     

 

                    Anemia, Pernicious    Oct 15 2010  1:42PM     

 

                    Peritoneal Neoplasm, Malignant    Oct 15 2010  1:42PM     

 

                    Bipolar disorder, unspecified    2011 12:01PM     

 

                    Hyperlipidemia      2011 12:01PM     

 

                    Anemia, Pernicious    2011 12:01PM     

 

                    Peritoneal Neoplasm, Malignant    2011 12:01PM     

 

                    Bipolar disorder, unspecified    Apr 15 2011 10:55AM     

 

                    Major Depression    2011 10:11AM     

 

                    Bipolar Disorder    2011 10:11AM     

 

                    Cancer              May 10 2011  4:16PM     

 

                    Major Depression    May 10 2011  3:16PM     

 

                    Bipolar Disorder    May 10 2011  3:16PM     

 

                    Hypercalcemia       May 23 2011  2:47PM     

 

                    Bipolar disorder, unspecified    May 23 2011  2:47PM     

 

                    Colon Cancer, Personal History    May 23 2011  2:47PM     

 

                    Bipolar Disorder    May 31 2011  4:39PM     

 

                    Depressive Disorder    2011 10:01AM     

 

                    Vitamin B12 deficiency    2011 10:01AM     

 

                    Vitamin D Deficiency    2011  5:07PM     

 

                    Anemia, Vitamin B12 Deficiency    2011  5:07PM     

 

                    B12 deficiency      2011  3:56PM     

 

                    Routine gynecological examination    Aug  4 2011  9:08AM     

 

                    Screening Examination for Breast Cancer    Aug  4 2011  9:08AM     

 

                    Tinea Corporis      Aug  4 2011  9:08AM     

 

                    Depressive Disorder    Sep 23 2011  8:47AM     

 

                    Contact Dermatitis    Sep 23 2011  8:47AM     

 

                    Anemia, Pernicious    Sep 23 2011  8:47AM     

 

                    B12 deficiency      Sep 23 2011  8:47AM     

 

                    B12 deficiency      Sep 27 2011  2:58PM     

 

                    B12 deficiency      Oct 20 2011  2:34PM     

 

                    Flu                 Dec  9 2011  3:16PM     

 

                    B12 deficiency      Dec  9 2011  3:17PM     

 

                    B12 deficiency      2012  4:52PM     

 

                    B12 deficiency      2012 11:10AM     

 

                    B12 deficiency      2012  3:37PM     

 

                    B12 deficiency      May  3 2012  4:10PM     

 

                    B12 deficiency      2012  2:54PM     

 

                    B12 deficiency      2012 11:23AM     

 

                    B12 deficiency      Aug  9 2012  2:08PM     

 

                    B12 deficiency      Sep  6 2012  4:36PM     

 

                    B12 deficiency      Oct 16 2012 10:23AM     

 

                    Flu                 Feb  4   3:11PM     

 

                    Bipolar disorder, unspecified    Feb  4   2:48PM     

 

                    Anemia, Pernicious    Feb  4   2:48PM     

 

                    B12 deficiency      Feb  4   2:48PM     

 

                    Extrapyramidal abnormal movement disorder    Feb  4   2:48PM     

 

                    B12 deficiency      Apr  3 2013 12:03PM     

 

                    Bipolar disorder, unspecified    May  7 2013  1:31PM     

 

                    Anemia, Pernicious    May  7 2013  1:31PM     

 

                    B12 deficiency      May  7 2013  1:31PM     

 

                    Extrapyramidal abnormal movement disorder    May  7 2013  1:31PM     

 

                    B12 deficiency      2013  3:42PM     

 

                    B12 deficiency      2013  1:31PM     

 

                    Hyperlipidemia      Aug  7 2013 10:37AM     

 

                    Vitamin D Deficiency    Aug  7 2013 10:37AM     

 

                    Bipolar disorder, unspecified    Aug  7 2013 10:37AM     

 

                    Anemia, Pernicious    Aug  7 2013 10:37AM     

 

                    B12 deficiency      Aug  7 2013 10:37AM     

 

                    B12 deficiency      Sep 25 2013 11:15AM     

 

                    B12 deficiency      Dec 11 2013  3:16PM     

 

                    B12 deficiency      Mar  6 2014  1:48PM     

 

                    B12 deficiency      May 21 2014  3:17PM     

 

                    Screening Examination for Breast Cancer    2014  3:23PM     

 

                    Periumbilical abdominal pain    2014  3:23PM     

 

                    B12 deficiency      Jul 10 2014  2:52PM     

 

                    Anemia, Vitamin B12 Deficiency    Aug 13 2014  4:50PM     

 

                    Bipolar disorder    Oct 16 2014 11:13AM     

 

                    Hyperlipidemia      Oct 16 2014 11:13AM     

 

                    Anemia, Pernicious    Oct 16 2014 11:13AM     

 

                    Peritoneal Neoplasm, Malignant    Oct 16 2014 11:13AM     

 

                    Screening breast examination    Oct 16 2014 11:13AM     

 

                    Weight loss         Oct 16 2014 11:13AM     

 

                    Anemia, Pernicious    Mar 23 2015  2:57PM     

 

                    B12 deficiency      Mar 23 2015  2:57PM     

 

                    Need for Prevnar vaccine    Mar 23 2015  2:57PM     

 

                    Bipolar disorder    Mar 23 2015  2:57PM     

 

                    Hyperlipidemia      Mar 23 2015  2:57PM     

 

                    Anemia, Pernicious    Mar 23 2015  2:57PM     

 

                    Peritoneal Neoplasm, Malignant    Mar 23 2015  2:57PM     

 

                    B12 deficiency      May  4 2015  4:48PM     

 

                    Hyperlipidemia      May 13 2015  9:56AM     

 

                    Anemia              May 13 2015  9:56AM     

 

                    Bipolar disorder    May 13 2015  9:56AM     

 

                    Bipolar disorder    May 14 2015  3:27PM     

 

                    Hyperlipidemia      May 14 2015  3:27PM     

 

                    Anemia, Pernicious    May 14 2015  3:27PM     

 

                    Peritoneal Neoplasm, Malignant    May 14 2015  3:27PM     

 

                    B12 deficiency      2015  2:20PM     

 

                    B12 deficiency      2015 11:34AM     

 

                    B12 deficiency      Aug 18 2015  9:06AM     

 

                    Tinea Corporis      Sep 18 2015  8:54AM     

 

                    B12 deficiency      Sep 18 2015  8:54AM     

 

                    B12 deficiency      2015 10:28AM     

 

                    Herpes zoster without complication    Dec  3 2015  9:52AM     

 

                    B12 deficiency      Dec 23 2015 11:21AM     

 

                    B12 deficiency      2016  4:51PM     

 

                    Vitamin B 12 deficiency    Mar 14 2016  5:35PM     

 

                    B12 deficiency      Mar 15 2016 12:14PM     

 

                    B12 deficiency      May  5 2016 11:30AM     

 

                    Edema               May  5 2016 11:30AM     

 

                    Dermatitis          May  5 2016 11:30AM     

 

                    Edema               May 17 2016  8:38AM     

 

                    Shortness of breath    May 17 2016  8:38AM     

 

                    Bilateral edema of lower extremity    2016  2:06PM     

 

                    B12 deficiency      2016  2:06PM     

 

                    B12 deficiency      2016 11:50AM     

 

                    B12 deficiency      2016 11:20AM     

 

                    Diarrhea            Aug  2 2016  3:13PM     

 

                    B12 deficiency      Aug 24 2016 11:10AM     

 

                    Encounter for screening mammogram for breast cancer    Aug 24 2016 11:44AM     

 

                    B12 deficiency      Sep 28 2016  2:35PM     

 

                    B12 deficiency      Dec 15 2016  2:02PM     

 

                    Dysuria             Dec 29 2016 12:14PM     

 

                    Hematuria           Fidencio  3 2017  1:33PM     

 

                    Constipation by delayed colonic transit    2017  1:52PM     

 

                    Ileus               2017  1:52PM     

 

                    UTI (urinary tract infection)    Fidencio 15 2017  3:39PM     

 

                    Acute cystitis with hematuria    2017 11:07AM     

 

                    B12 deficiency      2017 11:07AM     

 

                    B12 deficiency      2017 11:40AM     

 

                    B12 deficiency      2017  4:07PM     

 

                    Slurred speech      2017  3:07PM     

 

                    Vitamin B12 deficiency    2017  3:07PM     

 

                    Dysphagia, unspecified type    2017  3:07PM     

 

                    Hyperlipidemia      2017  3:07PM     

 

                    Dysuria             2017 12:01PM     

 

                    B12 deficiency      2017  2:08PM     

 

                    Dysuria             2017 10:58AM     

 

                    Hematuria           May 22 2017  1:36PM     

 

                    Depressive Disorder    May 22 2017  1:36PM     

 

                    Constipation        May 22 2017  1:36PM     

 

                    Fatigue             May 22 2017  1:36PM     

 

                    Urinary tract infection    May 30 2017  9:36AM     

 

                    Hypokalemia         May 30 2017 12:03PM     







Payers







           Insurance Name    Company Name    Plan Name    Plan Number    Policy Number    Policy Group

 Number                                 Start Date

 

                    Medicare [employer]    Medicare [facility]              [id number]              N/A

 

                          [employer]    [employer]                 [id number]

                                                    

 

                    Medicare Part A    Medicare - Lab/Xray              [id number]              2006

 

                    Medicare Part B    [employer] [place]              [id number]              2006

 

                          [email or web address]    [email or web address]                 50 percent

                                                    2009

 

                    [facility]              [id number]              N/A

 

                    Medicare Part A    Medicare Part A              [id number]              N/A

 

                    Medicare Part A    Medicare Part A              [id number]              2006









History of Encounters







                    Visit Date          Visit Type          Provider

 

                    2017           Office visit        Bhupinder Aspen DO

 

                    2017           Nurse visit         Bhupinder Aspen DO

 

                    2017           Office visit         

 

                    2017           Office visit        Bhupinder Aspen DO

 

                    2017           Nurse visit         Bhupinder Aspen DO

 

                    2017           Office visit        Radha Ontiveros APRN

 

                    1/15/2017           Office visit        Aj Tapia NP

 

                    2017            Office visit        Devin Masterson MD

 

                    2016          [facility]            Devin Masterson MD

 

                    12/15/2016          Nurse visit         Bhupinder Aspen DO

 

                    2016           Nurse visit         Bret Forte APRN

 

                    2016           Nurse visit         Bhupinder Aspen DO

 

                    2016            Office visit        Bhupinder Aspen DO

 

                    2016           Nurse visit         Bhupinder Aspen DO

 

                    2016           Office visit        Bret Forte APRN

 

                    2016            Office visit        Bhupinder Aspen DO

 

                    3/15/2016           Nurse visit         Bhupinder Aspen DO

 

                    2016            Nurse visit         Bhupinder Aspen DO

 

                    2015          Nurse visit         Bhupinder Aspen DO

 

                    12/3/2015           Office visit        Bhupinder Aspen DO

 

                    2015          Nurse visit         Bhupinder Aspen DO

 

                    2015           Office visit        Bhupinder Aspen DO

 

                    2015           Nurse visit         Bhupinder Aspen DO

 

                    2015            Nurse visit         Bhupinder Aspen DO

 

                    2015            Nurse visit         Bhupinder Aspen DO

 

                    2015           Office visit        Bhupinder Aspne DO

 

                    2015            Nurse visit         Bhupinder Aspen DO

 

                    3/23/2015           Office visit        Bhupinder Aspen DO

 

                    10/16/2014          Office visit        Bhupinder Aspen DO

 

                    2014           Nurse visit         Radha Ontiveros APRN

 

                    7/10/2014           Nurse visit         Bhupinder Aspen DO

 

                    2014           Office visit        Bhupinder Aspen DO

 

                    2014           Nurse visit         Bhupinder Aspen DO

 

                    3/6/2014            Nurse visit         Bhupinder Aspen DO

 

                    2014            [facility]            EARNEST Lopez MD

 

                    2013          Nurse visit         Bhupinder Aspen DO

 

                    2013           Nurse visit         Bhupinder Aspen DO

 

                    2013            Office visit        Bhupinder Aspen DO

 

                    2013            Nurse visit         Bhupinder Aspen DO

 

                    2013            Nurse visit         Bhupinder Aspen DO

 

                    2013            Office visit        Bhupinder Aspen DO

 

                    4/3/2013            Nurse visit         Bhupinder Aspen DO

 

                    2013            Office visit        Bhupinder Aspen DO

 

                    10/16/2012          Nurse visit         Bhupinder Aspen DO

 

                    2012            Nurse visit         Bhupinder Aspen DO

 

                    2012            Voided              Bhupinder Aspen DO

 

                    2012            Nurse visit         Bhupinder Aspen DO

 

                    2012            Nurse visit         Bhupinder Aspen DO

 

                    2012           Nurse visit         Bhupinder Aspen DO

 

                    5/3/2012            Nurse visit         Bhupinder Aspen DO

 

                    2012           Nurse visit         Bhupinder Aspen DO

 

                    2012           Nurse visit         Bhupinder Aspen DO

 

                    2012           Nurse visit         Bhupinder Aspen DO

 

                    2011           Nurse visit         Bhupinder Aspen DO

 

                    10/20/2011          Nurse visit         Bhupinder Aspen DO

 

                    2011           Office visit        Bhupinder Aspen DO

 

                    2011           Nurse visit         Radha GREEN

 

                    2011            Office visit        Bhupinder Aspen DO

 

                    2011           Nurse visit         Bhupinder Aspen DO

 

                    2011            Office visit        Bhupinder Aspen DO

 

                    2011           Office visit        Bhupinder Aspen DO

 

                    5/10/2011           Office visit        Bhupinder Aspen DO

 

                    2011           Office visit        Bhupinder Aspen DO

 

                    4/15/2011           Office visit        Devin Angel DO

 

                    2011           Office visit        Devin Angel DO

 

                    10/15/2010          Office visit        Devin Angel DO

 

                    2010           Office visit        Devin Angel DO

 

                    2010            Office visit        Devin Angel DO

 

                    2010           Office visit        Devin Angel DO

 

                    2010            Office visit        Devin Angel DO

 

                    3/8/2010            Office visit        Devin Masterson MD

 

                    2010            Surgery             Devin Masterson MD

 

                    2010            Office visit        Devin Angel DO

 

                    2010           Surgery             Devin Masterson MD

 

                    2010           Hospital            Devin Masterson MD

 

                    2010           [facility]            Devin Masterson MD

 

                    10/22/2009          Office visit        Devin Angel DO

## 2019-06-26 NOTE — XMS REPORT
MU2 Ambulatory Summary

                             Created on: 2016



Pauline Gan

External Reference #: [id number]

: 1950

Sex: Female



Demographics







                          Address                   [address]

[place]  [id number]

 

                          Home Phone                [phone]

 

                          Preferred Language        English

 

                          Marital Status            Legally 

 

                          Jehovah's witness Affiliation     Unknown

 

                          Race                      White

 

                          Ethnic Group              Not  or 





Author







                          Author                    Bhupinder Louise

 

                          [facility]

 

                          Address                   [address]

[place]  [id number]



 

                          Phone                     [phone]







Care Team Providers







                    Care Team Member Name    Role                Phone

 

                    Bhupinder Louise    PCP                 Unavailable







Allergies and Adverse Reactions







                    Name                Reaction            Notes

 

                    NO KNOWN DRUG ALLERGIES                         







Plan of Treatment







             Planned Activity    Comments     Planned Date    Planned Time    Plan/Goal

 

             COMPLETE CBC W/AUTO DIFF WBC                 2013     12:00 AM      

 

             ASSAY OF LITHIUM                 2013     12:00 AM      

 

             METABOLIC PANEL TOTAL CA                 2013     12:00 AM      

 

             VITAMIN B-12                 2013     12:00 AM      

 

             ASSAY OF FOLIC ACID SERUM                 2013     12:00 AM      

 

             THER/PROPH/DIAG INJ SC/IM                 2013    12:00 AM      







Medications







                                        Active 

 

             Name         Start Date    Estimated Completion Date    SIG          Comments

 

                    syringe with needle (disp) 1 mL 25 gauge x 1" miscellaneous syringe    2011             

                          use for injection once a month     

 

                Latuda 20 mg oral tablet                                    take 1 tablet (20 mg) by oral route once daily with

 food (at least 350 calories)            

 

             pravastatin 40 mg oral tablet    3/30/2015                 TAKE 1 TABLET BY MOUTH DAILY     

 

                Namenda XR 28 mg oral capsule,sprinkle,ER 24hr    2015                       take 1 capsule (28

 mg) by oral route once daily            

 

                prednisone 10 mg oral tablet    12/3/2015                       take 2 tablets (20 mg) by oral route

 once daily for 4 days 1 tablet daily for 4 days 0.5 tablet daily for 4 days     









                                         

 

             Name         Start Date    Expiration Date    SIG          Comments

 

             Reglan 10mg    3/29/2010    2010    one ac and hs     

 

                Keflex 500 mg oral capsule    2010       10/1/2010       take 1 capsule (500 mg) by oral

 route every 6 hours for 10 days         

 

                Bactrim -160 mg oral tablet    2011       take 1 tablet by oral route

 every 12 hours for 7 days               

 

                triamcinolone acetonide 0.1 % topical cream    2011      apply a thin

 layer to the affected area(s) by topical route 2 times per day     

 

                sertraline 100 mg oral tablet    4/10/2012       5/10/2012       take 1.5 tablets by oral route

 daily for 30 days                       

 

                ergocalciferol (vitamin D2) 50,000 unit oral capsule    4/15/2013       2013       TAKE

 ONE CAPSULE BY MOUTH ONCE A WEEK        

 

                CYANOCOBALAM 1000MCGINJ 1000 milliliter    2013       INJECT 1ML INTRAMUSCULAR

 ONCE A MONTH                            

 

                pravastatin 40 mg oral tablet    3/25/2014       3/20/2015       TAKE ONE TABLET BY MOUTH EVERY

 DAY                                     

 

                          Zostavax (PF) 19,400 unit/0.65 mL subcutaneous suspension for reconstitution    3/23/2015

                    3/24/2015           inject 0.65 milliliter by subcutaneous route once     

 

                lithium carbonate 300 mg oral capsule    2015       take 1 capsule (300

 mg) by oral route 2 for 30 days         

 

                famciclovir 500 mg oral tablet    12/3/2015       12/10/2015      take 1 tablet (500 mg) by

 oral route every 8 hours for 7 days     









                                        Discontinued 

 

             Name         Start Date    Discontinued Date    SIG          Comments

 

                Tylenol 325 mg oral tablet                    2013        take 1 - 2 tablets (325 -650 mg) by oral

 route every 4-6 hours as needed         

 

                Calcium 600 + D(3) 600 mg(1,500mg) -400 unit oral tablet                    2011       take 1 tablet

 by oral route 2 times a day            no longer taking

 

                Vitamin B-12 1,000 mcg oral tablet extended release    2010       take 1

 tablet by oral route daily             no longer taking

 

                Antifungal (clotrimazole) 1 % topical cream    2010       apply to the 

affected and surrounding areas of skin by topical route 2 times per day morning 
and evening                              

 

                sertraline 100 mg oral tablet    5/10/2011       2011       take 2 tablets (200 mg) by 

oral route once daily                   discontinued by Dr. Serrano

 

                mirtazapine 15 mg oral tablet                    2011        take 1 tablet (15 mg) by oral route 

once daily before bedtime               Dr. Serrano

 

                mirtazapine 15 mg oral tablet                    2011        take 1 tablet (15 mg) by oral route 

once daily before bedtime               dc'd by Dr. Serrano

 

                Pristiq 50 mg oral tablet extended release 24 hr                    2013        take 1 tablet (50

 mg) by oral route once daily           Dr. Serrano

 

                Pristiq 50 mg oral tablet extended release 24 hr                    2013        take 1 tablet (50

 mg) by oral route once daily           dose updated

 

                Vitamin B-12 1,000 mcg/mL injection solution    2011        inject 1 milliliter

 (1,000 mcg) by intramuscular route once a month    on list already

 

                clotrimazole 1 % topical cream    2011        apply to the affected and surrounding

 areas of skin by topical route 2 times per day in the morning and evening     

 

                Vitamin D2 50,000 unit oral capsule    2011        take 1 capsule (50,000

 unit) by oral route once weekly        generic on list

 

                Pravachol 40 mg oral tablet    2012        take 1 tablet (40 mg) by oral 

route once daily for 90 days            generic on list

 

                lithium carbonate 300 mg oral capsule    2012        take 1 capsule by oral

 route daily                            dose updated

 

                Pristiq 100 mg oral tablet extended release 24 hr                    4/10/2012       take 1 and 1/2 

tablet (150 mg) by oral route once daily    Mental Health provider

 

                Pristiq 100 mg oral tablet extended release 24 hr                    4/10/2012       take 1 and 1/2 

tablet (150 mg) by oral route once daily    Discontinued by Dr Efrain Knight at [facility]

 

                hydroxyzine HCl 50 mg oral tablet    10/16/2014      2015       take 1 tablet (50 mg) 

by oral route at bedtime                 

 

                fluconazole 100 mg oral tablet    2015       12/3/2015       take 1 tablet (100 mg) by 

oral route once a week                   

 

                ketoconazole 2 % topical cream    2015       12/3/2015       apply to the affected area(s)

 by topical route 2 times per day        







Problem List







                    Description         Status              Onset

 

                    Artificial opening status; colostomy    Active               

 

                    Bipolar disorder, unspecified    Active               

 

                    Hyperlipidemia      Active               

 

                    Peritoneal Neoplasm, Malignant    Active               

 

                    Anemia, Pernicious    Active               

 

                    Arthritis unspecified    Active               

 

                    B12 deficiency      Active               







Vital Signs







      Date    Time    BP-Sys(mm[Hg]    BP-Lynn(mm[Hg])    HR(bpm)    RR(rpm)    Temp    WT    HT    HC    BMI

                    BSA                 BMI Percentile      O2 Sat(%)

 

        12/3/2015    9:50:00 AM    132 mmHg    70 mmHg    62 bpm    16 rpm    97.9 F    145 lbs    69 in

                          21.41 kg/m2    1.79 m2                   100 %

 

        2015    8:52:00 AM    132 mmHg    68 mmHg    52 bpm    20 rpm    97.8 F    141 lbs    69 in

                          20.8218 kg/m    1.7645 m                 100 %

 

        2015    3:25:00 PM    120 mmHg    62 mmHg    72 bpm    16 rpm    98.1 F    136 lbs    69 in

                          20.08 kg/m2    1.73 m2                   98 %

 

       3/23/2015    2:55:00 PM    130 mmHg    76 mmHg    68 bpm    18 rpm    97 F    140 lbs    69 in    

                20.6742 kg/m    1.7583 m                      98 %

 

        10/16/2014    11:11:00 AM    120 mmHg    66 mmHg    77 bpm    20 rpm    98 F    130 lbs    69 in

                          19.20 kg/m2    1.69 m2                   100 %

 

        2014    3:21:00 PM    130 mmHg    66 mmHg    63 bpm    18 rpm    97.2 F    160 lbs    69 in

                          23.6276 kg/m    1.8797 m                 99 %

 

        2013    10:35:00 AM    132 mmHg    70 mmHg    66 bpm    20 rpm    98.1 F    157 lbs    69 in

                          23.18 kg/m2    1.86 m2                    

 

        2013    1:29:00 PM    132 mmHg    70 mmHg    76 bpm    18 rpm    98.2 F    166 lbs    69 in 

                          24.5137 kg/m    1.9146 m                  

 

       2013    2:46:00 PM    128 mmHg    70 mmHg    76 bpm    16 rpm    98 F    160 lbs    69 in     

                23.63 kg/m2     1.88 m2                          

 

        2011    8:49:00 AM    128 mmHg    78 mmHg    70 bpm    18 rpm    97.9 F    164 lbs    69 in

                          24.2183 kg/m    1.903 m                  

 

     2011    1:31:00 PM    132 mmHg    68 mmHg    84 bpm         97 F    167 lbs                        

                                         

 

        2011    9:09:00 AM    128 mmHg    70 mmHg    72 bpm    18 rpm    98.2 F    163 lbs    64 in 

                          27.9786 kg/m    1.8272 m                  

 

       2011    10:01:00 AM    132 mmHg    70 mmHg    72 bpm    18 rpm    98.2 F    154 lbs             

                                                                 

 

       2011    2:47:00 PM    128 mmHg    70 mmHg    72 bpm    18 rpm    97.8 F    156 lbs             

                                                                 

 

       5/10/2011    3:16:00 PM    144 mmHg    80 mmHg    72 bpm    18 rpm    98.2 F    158 lbs             

                                                                 

 

        2011    10:11:00 AM    132 mmHg    70 mmHg    70 bpm    18 rpm    98.2 F    168 lbs    69 in

                          24.809 kg/m    1.9261 m                  

 

        4/15/2011    10:52:00 AM    110 mmHg    60 mmHg    75 bpm    16 rpm    97.5 F    172.375 lbs    

69 in                     25.46 kg/m2    1.95 m2                   100 %

 

        2011    11:43:00 AM    120 mmHg    82 mmHg    75 bpm    16 rpm    97.2 F    178.5 lbs    69

 in                       26.3596 kg/m    1.9854 m                 100 %

 

        10/15/2010    1:32:00 PM    120 mmHg    70 mmHg    80 bpm    18 rpm    96.6 F    177 lbs    69 in

                          26.14 kg/m2    1.98 m2                   100 %

 

        2010    3:50:00 PM    168 mmHg    100 mmHg    82 bpm    18 rpm    97.8 F    177.5 lbs    69

 in                       26.2119 kg/m    1.9798 m                 97 %

 

        2010    1:21:00 PM    140 mmHg    80 mmHg    59 bpm    16 rpm    97.6 F    173.25 lbs    69 

in                        25.58 kg/m2    1.96 m2                   100 %

 

        2010    3:02:00 PM    140 mmHg    80 mmHg    61 bpm    16 rpm    97.6 F    173.125 lbs    69

 in                       25.5658 kg/m    1.9553 m                 99 %

 

        2010    1:23:00 PM    130 mmHg    80 mmHg    66 bpm    16 rpm    96.8 F    173 lbs    69 in 

                          25.55 kg/m2    1.95 m2                   100 %

 

        2010    12:58:00 PM    130 mmHg    88 mmHg    75 bpm    16 rpm    98.4 F    172.25 lbs    69

 in                       25.4366 kg/m    1.9503 m                 100 %







Social History







                    Name                Description         Comments

 

                    denies alcohol use                         

 

                    denies smoking                           

 

                    Denies illicit substance abuse                         

 

                    retired                                 direct care

 

                    Single                                   

 

                    Exercises regularly                         

 

                    Attended some college                         

 

                    Tobacco             Never smoker         







History of Procedures







                    Date Ordered        Description         Order Status

 

                    2010 12:00 AM    COMPREHEN METABOLIC PANEL    Reviewed

 

                    2010 12:00 AM    COMPLETE CBC W/AUTO DIFF WBC    Reviewed

 

                    2010 12:00 AM    LIPID PANEL         Reviewed

 

                          2015 12:00 AM        B12 Injection, Up to 1000 Mcg NDC#8942-5498-14 [facility] 

                                        Reviewed

 

                    2011 12:00 AM    MAMMOGRAM SCREENING    Reviewed

 

                    2011 12:00 AM    CYTOPATH C/V THIN LAYER    Reviewed

 

                    2011 12:00 AM    B12 Injection 1 cc NDC#[address]-95    Reviewed

 

                    2015 12:00 AM    THER/PROPH/DIAG INJ SC/IM    Reviewed

 

                    2015 12:00 AM    B12 Injection, Up to 1000 Mcg NDC#8608-6506-96    Reviewed

 

                    2011 12:00 AM    THER/PROPH/DIAG INJ SC/IM    Reviewed

 

                    2011 12:00 AM    B12 Injection([facility]) Ndc#7434-1704-88-    Reviewed

 

                    2015 12:00 AM    THER/PROPH/DIAG INJ SC/IM    Reviewed

 

                    2015 12:00 AM    B12 Injection, Up to 1000 Mcg NDC#0203-8798-05    Reviewed

 

                    10/20/2011 12:00 AM    THER/PROPH/DIAG INJ SC/IM    Reviewed

 

                    10/20/2011 12:00 AM    B12 Injection([facility]) Ndc#1516-7534-86-    Reviewed

 

                    2016 12:00 AM    THER/PROPH/DIAG INJ SC/IM    Reviewed

 

                    2016 12:00 AM    B12 Injection, Up to 1000 Mcg NDC#3121-9924-98    Reviewed

 

                    3/14/2016 12:00 AM    VITAMIN B-12        Reviewed

 

                    3/15/2016 12:00 AM    THER/PROPH/DIAG INJ SC/IM    Reviewed

 

                    3/15/2016 12:00 AM    B12 Injection, Up to 1000 Mcg NDC#7529-9642-91    Reviewed

 

                    2011 12:00 AM    ***Immunization administration, Medicare flu    Reviewed

 

                    2011 12:00 AM    Fluzone ** MEDICARE Only **    Reviewed

 

                    2011 12:00 AM    THER/PROPH/DIAG INJ SC/IM    Reviewed

 

                    2011 12:00 AM    B12 Injection ([facility]) Ndc#5904-8722-92    Reviewed

 

                    2012 12:00 AM    THER/PROPH/DIAG INJ SC/IM    Reviewed

 

                    2012 12:00 AM    B12 Injection ([facility]) Ndc#2273-4582-21    Reviewed

 

                    2012 12:00 AM    THER/PROPH/DIAG INJ SC/IM    Reviewed

 

                    2012 12:00 AM    B12 Injection([facility]) Ndc#9338-5755-15-    Reviewed

 

                    5/3/2012 12:00 AM    THER/PROPH/DIAG INJ SC/IM    Reviewed

 

                    5/3/2012 12:00 AM    B12 Injection([facility]) Ndc#5751-4212-91-    Reviewed

 

                    2012 12:00 AM    IMMUNOTHERAPY INJECTIONS    Reviewed

 

                    2012 12:00 AM    B12 Injection([place]) Ndc#8262-9754-26-    Reviewed

 

                    2012 12:00 AM    THER/PROPH/DIAG INJ SC/IM    Reviewed

 

                    2012 12:00 AM    B12 Injection, Up to 1000 Mcg NDC#3865-1323-58    Reviewed

 

                    2012 12:00 AM    THER/PROPH/DIAG INJ SC/IM    Reviewed

 

                    2012 12:00 AM    B12 Injection, Up to 1000 Mcg NDC#2370-8783-81    Reviewed

 

                    2012 12:00 AM    THER/PROPH/DIAG INJ SC/IM    Reviewed

 

                    2012 12:00 AM    B12 Injection, Up to 1000 Mcg NDC#8857-6959-57    Reviewed

 

                    10/16/2012 12:00 AM    THER/PROPH/DIAG INJ SC/IM    Reviewed

 

                    10/16/2012 12:00 AM    B12 Injection, Up to 1000 Mcg NDC#4627-9020-18    Reviewed

 

                    2010 12:00 AM    COMPREHEN METABOLIC PANEL    Reviewed

 

                    2010 12:00 AM    COMPLETE CBC W/AUTO DIFF WBC    Reviewed

 

                    2010 12:00 AM    LIPID PANEL         Reviewed

 

                    2013 12:00 AM    Flu Injection 3 Years And Above NDC# [address]-59  [facility]    Reviewed



 

                    2013 12:00 AM    COMPLETE CBC W/AUTO DIFF WBC    Reviewed

 

                    2013 12:00 AM    ASSAY OF LITHIUM    Reviewed

 

                    2013 12:00 AM    METABOLIC PANEL TOTAL CA    Reviewed

 

                    4/3/2013 12:00 AM    THER/PROPH/DIAG INJ SC/IM    Reviewed

 

                    4/3/2013 12:00 AM    B12 Injection, Up to 1000 Mcg NDC#8217-8651-80    Reviewed

 

                    2013 12:00 AM    THER/PROPH/DIAG INJ SC/IM    Reviewed

 

                    2013 12:00 AM    B12 Injection, Up to 1000 Mcg NDC#8594-8794-69    Reviewed

 

                    2013 12:00 AM    THER/PROPH/DIAG INJ SC/IM    Reviewed

 

                    2013 12:00 AM    B12 Injection, Up to 1000 Mcg NDC#2558-1280-17    Reviewed

 

                    2013 12:00 AM    LIPID PANEL         Reviewed

 

                    2013 12:00 AM    VITAMIN D 25 HYDROXY    Reviewed

 

                    2013 12:00 AM    THER/PROPH/DIAG INJ SC/IM    Reviewed

 

                    3/6/2014 12:00 AM    THER/PROPH/DIAG INJ SC/IM    Reviewed

 

                    2014 12:00 AM    THER/PROPH/DIAG INJ SC/IM    Reviewed

 

                    2014 12:00 AM    B12 Injection, Up to 1000 Mcg NDC#0600-8235-18    Reviewed

 

                    2010 12:00 AM    SKIN FUNGI CULTURE    Reviewed

 

                    10/9/2010 12:00 AM    COMPREHEN METABOLIC PANEL    Reviewed

 

                    10/9/2010 12:00 AM    LIPID PANEL         Reviewed

 

                    2010 12:00 AM    THER/PROPH/DIAG INJ SC/IM    Reviewed

 

                    2010 12:00 AM    B12 Injection Ndc#[address]-80 ([facility])    Reviewed

 

                    2010 12:00 AM    THER/PROPH/DIAG INJ SC/IM    Reviewed

 

                    2010 12:00 AM    Kenalog 40 Mg Im-Ndc#[address]-01 ([facility])    Reviewed

 

                    10/15/2010 12:00 AM    FLU VACCINE 3 YRS & > IM    Reviewed

 

                    10/15/2010 12:00 AM    Admin.Of M/C Cov.Vaccine-Flu Vacc.    Reviewed

 

                    1/15/2011 12:00 AM    COMPLETE CBC W/AUTO DIFF WBC    Reviewed

 

                    1/15/2011 12:00 AM    COMPREHEN METABOLIC PANEL    Reviewed

 

                    1/15/2011 12:00 AM    LIPID PANEL         Reviewed

 

                    2014 12:00 AM    MAMMOGRAM SCREENING    Reviewed

 

                    2014 12:00 AM    Screening mammography, bilateral    Reviewed

 

                    7/10/2014 12:00 AM    THER/PROPH/DIAG INJ SC/IM    Reviewed

 

                    7/10/2014 12:00 AM    B12 Injection, Up to 1000 Mcg NDC#4312-9605-30    Reviewed

 

                    2011 12:00 AM    COMPLETE CBC W/AUTO DIFF WBC    Reviewed

 

                    2011 12:00 AM    COMPREHEN METABOLIC PANEL    Reviewed

 

                    2011 12:00 AM    LIPID PANEL         Reviewed

 

                    2014 12:00 AM    B12 Injection, Up to 1000 Mcg NDC#2423-6402-12    Reviewed

 

                    10/19/2014 12:00 AM    MAMMOGRAM SCREENING    Reviewed

 

                    10/19/2014 12:00 AM    Screening mammography, bilateral    Reviewed

 

                    10/16/2014 12:00 AM    COMPLETE CBC W/AUTO DIFF WBC    Reviewed

 

                    10/16/2014 12:00 AM    COMPREHEN METABOLIC PANEL    Reviewed

 

                    10/16/2014 12:00 AM    IMMUNOASSAY TUMOR     Reviewed

 

                    10/16/2014 12:00 AM    LIPID PANEL         Reviewed

 

                    10/16/2014 12:00 AM    ASSAY OF LITHIUM    Reviewed

 

                    10/16/2014 12:00 AM    MAMMOGRAM SCREENING    Reviewed

 

                    2011 12:00 AM    ASSAY OF PARATHORMONE    Reviewed

 

                    2011 12:00 AM    VITAMIN D 25 HYDROXY    Reviewed

 

                    2011 12:00 AM    ASSAY OF LITHIUM    Reviewed

 

                    2011 12:00 AM    METABOLIC PANEL TOTAL CA    Reviewed

 

                    2011 12:00 AM    CT HEAD/BRAIN W/O & W/DYE    Reviewed

 

                    3/23/2015 12:00 AM    PNEUMOCOCCAL VACC 13 GLENDY IM    Reviewed

 

                    3/23/2015 12:00 AM    Vitamin B12 injection    Reviewed

 

                    2011 12:00 AM    ASSAY OF LITHIUM    Reviewed

 

                    2011 12:00 AM    B12 Injection Ndc#[address]-12  [facility]    Reviewed

 

                    2015 12:00 AM    THER/PROPH/DIAG INJ SC/IM    Reviewed

 

                    2015 12:00 AM    B12 Injection, Up to 1000 Mcg NDC#9352-4852-39    Reviewed

 

                    2015 12:00 AM    COMPLETE CBC W/AUTO DIFF WBC    Reviewed

 

                    2015 12:00 AM    COMPREHEN METABOLIC PANEL    Reviewed

 

                    2015 12:00 AM    LIPID PANEL         Reviewed

 

                    2015 12:00 AM    ASSAY OF LITHIUM    Reviewed

 

                    2011 12:00 AM    VIT D 1 25-DIHYDROXY    Reviewed

 

                    2011 12:00 AM    VITAMIN B-12        Reviewed

 

                    2015 12:00 AM    B12 Injection, Up to 1000 Mcg NDC#7925-9801-48    Reviewed

 

                    2015 12:00 AM    THER/PROPH/DIAG INJ SC/IM    Reviewed

 

                    2015 12:00 AM    B12 Injection, Up to 1000 Mcg NDC#0570-4599-17    Reviewed

 

                    2011 12:00 AM    THER/PROPH/DIAG INJ SC/IM    Reviewed

 

                    2011 12:00 AM    B12 Injection ([facility]) Ndc#8079-2949-19    Reviewed

 

                    2015 12:00 AM    THER/PROPH/DIAG INJ SC/IM    Reviewed

 

                    2015 12:00 AM    B12 Injection, Up to 1000 Mcg NDC#6588-7033-94    Reviewed







Results Summary







                          Data and Description      Results

 

                          2004 12:00 AM        Colonoscopy-Women and Men over 50 Normal 

 

                          2008 12:00 AM         Pap Smear Declined 

 

                          10/7/2009 12:00 AM        Cholest Cry Stone Ql .0 %LDLc SerPl-mCnc 123.0 mg/dLHDLc

 SerPl-mCnc 34.0 mg/dLTrigl SerPl-mCnc 190.0 mg/dLGlucose SerPl-mCnc 78.0 mg/dL

 

                          2009 12:00 AM        Mammogram -Women over 40 Normal HIV1+2 Ab Ser Ql no risk 

 

                          2010 8:47 AM         Dexa Bone Scan Refused Aspirin reccommended Contraindication 



 

                          2010 8:48 AM         Depression Done 

 

                          2010 12:00 AM         Foot Exam-Diabetic Done 

 

                          2010 12:00 AM         Cholest Cry Stone Ql .0 %LDLc SerPl-mCnc 126.0 mg/dLGlucose

 SerPl-mCnc 102.0 mg/dL

 

                          2010 8:45 AM          TRIGLYCERIDES 122.0 mg/dLCHOLESTEROL 186.0 mg/dLHDL 36.0 mg/dLLDL

 (CALC) 126.0 mg/dLGLUCOSE 102.0 mg/dLSODIUM 143.0 mmol/LPOTASSIUM 3.70 
mmol/LCHLORIDE 111.0 mmol/LCO2 23.0 mmol/LBUN 10.0 mg/dLCREATININE 0.80 
mg/dLSGOT/AST 12.0 IU/LSGPT/ALT 11.0 IU/LALK PHOS 65.0 IU/LTOTAL PROTEIN 7.20 
g/dLALBUMIN 3.90 g/dLTOTAL BILI 0.50 mg/dLCALCIUM 10.20 mg/dLeGFR >60 
mL/min/1.73 m2WBC 5.7 RBC 3.26 HGB 10.60 g/dLHCT 31.70 %MCV 97.0 fLMCH 32.50 
pgMCHC 33.40 g/dLRDW CV 13.30 %MPV 9.70 fLPLT 287 %NEUT 62.90 %%LYMP 21.80 
%%MONO 9.90 %%EOS 5.0 %%BASO 0.40 %#NEUT 3.56 #LYMP 1.23 #MONO 0.56 #EOS 0.28 
#BASO 0.02 

 

                          2010 12:00 AM        Glucose SerPl-mCnc 96.0 mg/dLCholest Cry Stone Ql .0 %LDLc

 SerPl-mCnc 146.0 mg/dL

 

                          2010 8:26 AM         TRIGLYCERIDES 106.0 mg/dLCHOLESTEROL 199.0 mg/dLHDL 32.0 mg/dLLDL

 (CALC) 146.0 mg/dLGLUCOSE 96.0 mg/dLSODIUM 143.0 mmol/LPOTASSIUM 4.0 
mmol/LCHLORIDE 113.0 mmol/LCO2 24.0 mmol/LBUN 13.0 mg/dLCREATININE 1.0 
mg/dLSGOT/AST 11.0 IU/LSGPT/ALT 6.0 IU/LALK PHOS 56.0 IU/LTOTAL PROTEIN 6.60 
g/dLALBUMIN 3.80 g/dLTOTAL BILI 0.50 mg/dLCALCIUM 9.30 mg/dLeGFR 57 

 

                          10/6/2010 12:00 AM        Cholest Cry Stone Ql .0 %LDLc SerPl-mCnc 111.0 mg/dLGlucose

 SerPl-mCnc 81.0 mg/dL

 

                          10/6/2010 2:45 PM         TRIGLYCERIDES 123.0 mg/dLCHOLESTEROL 178.0 mg/dLHDL 42.0 mg/dLLDL

 (CALC) 111.0 mg/dLGLUCOSE 81.0 mg/dLSODIUM 139.0 mmol/LPOTASSIUM 4.10 
mmol/LCHLORIDE 106.0 mmol/LCO2 24.0 mmol/LBUN 13.0 mg/dLCREATININE 0.90 
mg/dLSGOT/AST 13.0 IU/LSGPT/ALT 11.0 IU/LALK PHOS 61.0 IU/LTOTAL PROTEIN 7.10 
g/dLALBUMIN 3.90 g/dLTOTAL BILI 0.30 mg/dLCALCIUM 9.30 mg/dLeGFR >60 mL/min/1.73
 m2WBC 6.9 RBC 3.59 HGB 11.50 g/dLHCT 35.30 %MCV 98.0 fLMCH 32.0 pgMCHC 32.60 
g/dLRDW CV 12.90 %MPV 9.90 fLPLT 311 %NEUT 64.90 %%LYMP 22.50 %%MONO 7.20 %%EOS 
5.10 %%BASO 0.30 %#NEUT 4.45 #LYMP 1.54 #MONO 0.49 #EOS 0.35 #BASO 0.02 

 

                          2011 12:00 AM         Mammogram -Women over 40 Ordered 

 

                          2011 10:25 AM        TRIGLYCERIDES 111.0 mg/dLCHOLESTEROL 195.0 mg/dLHDL 43.0 mg/dLLDL

 (CALC) 130.0 mg/dLWBC 5.3 RBC 3.76 HGB 12.0 g/dLHCT 37.80 %.0 fLMCH 
31.90 pgMCHC 31.70 g/dLRDW CV 13.0 %MPV 9.70 fLPLT 259 %NEUT 69.0 %%LYMP 17.60 
%%MONO 8.30 %%EOS 4.70 %%BASO 0.40 %#NEUT 3.63 #LYMP 0.93 #MONO 0.44 #EOS 0.25 
#BASO 0.02 GLUCOSE 102.0 mg/dLSODIUM 146.0 mmol/LPOTASSIUM 4.20 mmol/LCHLORIDE 
113.0 mmol/LCO2 23.0 mmol/LBUN 15.0 mg/dLCREATININE 1.0 mg/dLSGOT/AST 12.0 
IU/LSGPT/ALT 17.0 IU/LALK PHOS 60.0 IU/LTOTAL PROTEIN 6.90 g/dLALBUMIN 4.20 
g/dLTOTAL BILI 0.40 mg/dLCALCIUM 9.70 mg/dLeGFR 57 

 

                          2011 11:49 AM        Cholest Cry Stone Ql .0 %LDLc SerPl-mCnc 130.0 mg/dLHDLc

 SerPl-mCnc 43.0 mg/dLTrigl SerPl-mCnc 111.0 mg/dLGlucose SerPl-mCnc 102.0 mg/dL

 

                          2011 11:52 AM        Pap Smear Declined 

 

                          2011 11:28 AM        Lithium 2.080 mmol/LGLUCOSE 102.0 mg/dLSODIUM 135.0 mmol/LPOTASSIUM

 3.90 mmol/LCHLORIDE 106.0 mmol/LCO2 21.0 mmol/LBUN 12.0 mg/dLCREATININE 1.30 
mg/dLCALCIUM 10.70 mg/dLeGFR 42 

 

                          2011 8:58 AM          Lithium 0.690 mmol/L

 

                          2011 2:38 PM         VITAMIN B12 3483.0 pg/mL

 

                          2013 3:35 PM          WBC 5.1 RBC 3.73 HGB 11.70 g/dLHCT 36.40 %MCV 98.0 fLMCH 31.40

 pgMCHC 32.10 g/dLRDW CV 13.10 %MPV 9.80 fLPLT 224 %NEUT 66.80 %%LYMP 19.10 
%%MONO 9.0 %%EOS 4.90 %%BASO 0.20 %#NEUT 3.42 #LYMP 0.98 #MONO 0.46 #EOS 0.25 
#BASO 0.01 GLUCOSE 88.0 mg/dLSODIUM 141.0 mmol/LPOTASSIUM 4.10 mmol/LCHLORIDE 
110.0 mmol/LCO2 22.0 mmol/LBUN 22.0 mg/dLCREATININE 1.10 mg/dLCALCIUM 9.80 
mg/dLeGFR 50 Lithium 0.760 mmol/L

 

                          2013 11:02 AM        TRIGLYCERIDES 106.0 mg/dLCHOLESTEROL 181.0 mg/dLHDL 46.0 mg/dLLDL

 (CALC) 114.0 mg/dLVITAMIN D 41.10 ng/mL

 

                          10/17/2014 10:10 AM       WBC 5.0 RBC 3.66 HGB 11.60 g/dLHCT 36.80 %.0 fLMCH 31.70

 pgMCHC 31.50 g/dLRDW CV 13.50 %MPV 10.10 fLPLT 209 %NEUT 69.20 %%LYMP 21.0 
%%MONO 6.40 %%EOS 3.20 %%BASO 0.20 %#NEUT 3.46 #LYMP 1.05 #MONO 0.32 #EOS 0.16 
#BASO 0.01 GLUCOSE 100.0 mg/dLSODIUM 148.0 mmol/LPOTASSIUM 3.90 mmol/LCHLORIDE 
114.0 mmol/LCO2 26.0 mmol/LBUN 12.0 mg/dLCREATININE 1.20 mg/dLSGOT/AST 9.0 
IU/LSGPT/ALT <6 IU/LALK PHOS 82.0 IU/LTOTAL PROTEIN 6.90 g/dLALBUMIN 4.0 
g/dLTOTAL BILI 0.40 mg/dLCALCIUM 10.50 mg/dLeGFR 45 TRIGLYCERIDES 96.0 
mg/dLCHOLESTEROL 155.0 mg/dLHDL 38.0 mg/dLLDL (CALC) 98.0 mg/dLLithium 0.850 
mmol/LCancer Antigen (CA) 125 8.30 U/mL

 

                          2015 10:25 AM        Lithium 0.790 mmol/LWBC 4.8 RBC 3.44 HGB 11.0 g/dLHCT 35.20 

%.0 fLMCH 32.0 pgMCHC 31.30 g/dLRDW CV 14.0 %MPV 9.30 fLPLT 210 %NEUT 
70.80 %%LYMP 17.20 %%MONO 8.10 %%EOS 3.50 %%BASO 0.40 %#NEUT 3.41 #LYMP 0.83 
#MONO 0.39 #EOS 0.17 #BASO 0.02 TRIGLYCERIDES 107.0 mg/dLCHOLESTEROL 174.0 
mg/dLHDL 43.0 mg/dLLDL (CALC) 110.0 mg/dLGLUCOSE 90.0 mg/dLSODIUM 145.0 
mmol/LPOTASSIUM 3.80 mmol/LCHLORIDE 115.0 mmol/LCO2 24.0 mmol/LBUN 17.0 mg
/dLCREATININE 1.30 mg/dLSGOT/AST 18.0 IU/LSGPT/ALT 17.0 IU/LALK PHOS 56.0 
IU/LTOTAL PROTEIN 6.70 g/dLALBUMIN 3.90 g/dLTOTAL BILI 0.40 mg/dLCALCIUM 9.80 
mg/dLeGFR 41 

 

                          2015 8:50 AM        WBC 5.8 RBC 3.29 HGB 10.70 g/dLHCT 34.0 %.0 fLMCH 32.50

 pgMCHC 31.50 g/dLRDW CV 13.60 %MPV 9.60 fLPLT 223 %NEUT 69.60 %%LYMP 18.90 
%%MONO 8.50 %%EOS 2.80 %%BASO 0.20 %#NEUT 4.03 #LYMP 1.09 #MONO 0.49 #EOS 0.16 
#BASO 0.01 Lithium 0.620 mmol/LGLUCOSE 83.0 mg/dLSODIUM 139.0 mmol/LPOTASSIUM 
3.90 mmol/LCHLORIDE 109.0 mmol/LCO2 22.0 mmol/LBUN 19.0 mg/dLCREATININE 1.40 
mg/dLSGOT/AST 19.0 IU/LSGPT/ALT 21.0 IU/LALK PHOS 55.0 IU/LTOTAL PROTEIN 6.50 
g/dLALBUMIN 3.90 g/dLTOTAL BILI 0.50 mg/dLCALCIUM 9.60 mg/dLeGFR 38 
TRIGLYCERIDES 121.0 mg/dLCHOLESTEROL 192.0 mg/dLHDL 51.0 mg/dLLDL 121.0 mg/dLTSH
 1.210 uIU/mLHemoglobin A1c 5.40 %

 

                          3/15/2016 8:08 AM         VITAMIN B12 696.0 pg/mL

 

                          3/23/2016 8:26 AM         WBC 7.0 RBC 3.61 HGB 11.80 g/dLHCT 37.70 %.0 fLH 32.70

 pgHC 31.30 g/dLRDW CV 12.50 %MPV 10.0 fLPLT 207 %NEUT 73.60 %%LYMP 16.40 
%%MONO 6.60 %%EOS 3.0 %%BASO 0.30 %#NEUT 5.15 #LYMP 1.15 #MONO 0.46 #EOS 0.21 
#BASO 0.02 Lithium 0.940 mmol/LGLUCOSE 108.0 mg/dLSODIUM 143.0 mmol/LPOTASSIUM 
4.30 mmol/LCHLORIDE 110.0 mmol/LCO2 27.0 mmol/LBUN 16.0 mg/dLCREATININE 1.60 
mg/dLSGOT/AST 13.0 IU/LSGPT/ALT 7.0 IU/LALK PHOS 71.0 IU/LTOTAL PROTEIN 6.80 
g/dLALBUMIN 4.0 g/dLTOTAL BILI 0.20 mg/dLCALCIUM 10.40 mg/dLeGFR 32 
TRIGLYCERIDES 113.0 mg/dLCHOLESTEROL 169.0 mg/dLHDL 42.0 mg/dLLDL (CALC) 104.0 
mg/dLTSH 2.20 uIU/mLHemoglobin A1c 5.20 %

 

                          3/25/2016 9:17 AM         COLOR YELLOW APPEARANCE CLEAR SPEC GRAV 1.010 pH 7.0 PROTEIN 

NEGATIVE GLUCOSE NEGATIVE mg/dLKETONE NEGATIVE BILIRUBIN NEGATIVE BLOOD NEGATIVE
 NITRITE NEGATIVE LEUK SCREEN SMALL CASTS/LPF NEGATIVE /LPFCRYSTALS NEGATIVE 
MUCOUS THRDS NEGATIVE BACTERIA NEGATIVE EPITH CELLS FEW SQUAMOUS /HPFTRICHOMONAS
 NEGATIVE YEAST NEGATIVE 







History Of Immunizations







       Name    Date Admin    Mfg Name    Mfg Code    Trade Name    Lot#    Route    Inj    Vis Given    Vis

 Pub                                    CVX

 

        Influenza    2008    Not Entered    NE      Not Entered            Not Entered    Not Entered

                    1            999

 

        X       12/19/2008    [employer]    MSD     Pneumovax 23            Intramuscular    Not Entered

                    1            999

 

           Influenza    10/15/2010    [employer]    NOV        Fluvirin > 12 Years    

[id number]     Intramuscular    Left Deltoid    10/15/2010    2009    999

 

          X         3/23/2015    [facility]    [facility]       Prevnar 13    [id number]    Intramuscular

                Right Gluteous Medius    3/23/2015       2013       109







History of Past Illness







                    Name                Date of Onset       Comments

 

                    Peritoneal Neoplasm, Malignant                         

 

                    Hyperlipidemia                           

 

                    Bipolar disorder, unspecified                         

 

                    Artificial opening status; colostomy                         

 

                    B12 deficiency                           

 

                    Anemia, Pernicious                         

 

                    Arthritis unspecified                         

 

                    cervical cancer                          

 

                    Artificial opening status; colostomy    2010  1:10PM     

 

                    Bipolar disorder, unspecified    2010  1:10PM     

 

                    Hyperlipidemia      2010  1:10PM     

 

                    Anemia, Pernicious    2010  1:10PM     

 

                    Postoperative Follow-Up    2010  1:55PM     

 

                    Postoperative Follow-Up    Mar  8 2010 10:57AM     

 

                    Artificial opening status; colostomy    Mar  8 2010  1:19PM     

 

                    Peritoneal Neoplasm, Malignant    Mar  8 2010  1:19PM     

 

                    Artificial opening status; colostomy    2010  1:40PM     

 

                    Hyperlipidemia      2010  1:40PM     

 

                    Anemia, Pernicious    2010  1:40PM     

 

                    Peritoneal Neoplasm, Malignant    2010  1:40PM     

 

                    Arthritis unspecified    2010  1:40PM     

 

                    Anemia of Chronic Illness    2010  1:40PM     

 

                    Tinea corporis      2010  3:17PM     

 

                    Bipolar disorder, unspecified    2010  1:33PM     

 

                    Hyperlipidemia      2010  1:33PM     

 

                    Anemia, Pernicious    2010  1:33PM     

 

                    Peritoneal Neoplasm, Malignant    2010  1:33PM     

 

                    B12 deficiency      2010  1:33PM     

 

                    Ethmoidal Sinusitis, Acute    Sep 21 2010  3:53PM     

 

                    Wheezing            Sep 21 2010  3:53PM     

 

                    Flu                 Oct 15 2010  1:40PM     

 

                    Bipolar disorder, unspecified    Oct 15 2010  1:42PM     

 

                    Hyperlipidemia      Oct 15 2010  1:42PM     

 

                    Anemia, Pernicious    Oct 15 2010  1:42PM     

 

                    Peritoneal Neoplasm, Malignant    Oct 15 2010  1:42PM     

 

                    Bipolar disorder, unspecified    2011 12:01PM     

 

                    Hyperlipidemia      2011 12:01PM     

 

                    Anemia, Pernicious    2011 12:01PM     

 

                    Peritoneal Neoplasm, Malignant    2011 12:01PM     

 

                    Bipolar disorder, unspecified    Apr 15 2011 10:55AM     

 

                    Major Depression    2011 10:11AM     

 

                    Bipolar Disorder    2011 10:11AM     

 

                    Cancer              May 10 2011  4:16PM     

 

                    Major Depression    May 10 2011  3:16PM     

 

                    Bipolar Disorder    May 10 2011  3:16PM     

 

                    Hypercalcemia       May 23 2011  2:47PM     

 

                    Bipolar disorder, unspecified    May 23 2011  2:47PM     

 

                    Colon Cancer, Personal History    May 23 2011  2:47PM     

 

                    Bipolar Disorder    May 31 2011  4:39PM     

 

                    Depressive Disorder    2011 10:01AM     

 

                    Vitamin B12 deficiency    2011 10:01AM     

 

                    Vitamin D Deficiency    2011  5:07PM     

 

                    Anemia, Vitamin B12 Deficiency    2011  5:07PM     

 

                    B12 deficiency      2011  3:56PM     

 

                    Routine gynecological examination    Aug  4 2011  9:08AM     

 

                    Screening Examination for Breast Cancer    Aug  4 2011  9:08AM     

 

                    Tinea Corporis      Aug  4 2011  9:08AM     

 

                    Depressive Disorder    Sep 23 2011  8:47AM     

 

                    Contact Dermatitis    Sep 23 2011  8:47AM     

 

                    Anemia, Pernicious    Sep 23 2011  8:47AM     

 

                    B12 deficiency      Sep 23 2011  8:47AM     

 

                    B12 deficiency      Sep 27 2011  2:58PM     

 

                    B12 deficiency      Oct 20 2011  2:34PM     

 

                    Flu                 Dec  9 2011  3:16PM     

 

                    B12 deficiency      Dec  9 2011  3:17PM     

 

                    B12 deficiency      2012  4:52PM     

 

                    B12 deficiency      2012 11:10AM     

 

                    B12 deficiency      2012  3:37PM     

 

                    B12 deficiency      May  3 2012  4:10PM     

 

                    B12 deficiency      2012  2:54PM     

 

                    B12 deficiency      2012 11:23AM     

 

                    B12 deficiency      Aug  9 2012  2:08PM     

 

                    B12 deficiency      Sep  6 2012  4:36PM     

 

                    B12 deficiency      Oct 16 2012 10:23AM     

 

                    Flu                 b  2013  3:11PM     

 

                    Bipolar disorder, unspecified    b  2013  2:48PM     

 

                    Anemia, Pernicious    b  2013  2:48PM     

 

                    B12 deficiency      Fe2013  2:48PM     

 

                    Extrapyramidal abnormal movement disorder    Feb  2013  2:48PM     

 

                    B12 deficiency      Apr  3 2013 12:03PM     

 

                    Bipolar disorder, unspecified    May  7 2013  1:31PM     

 

                    Anemia, Pernicious    May  7 2013  1:31PM     

 

                    B12 deficiency      May  7 2013  1:31PM     

 

                    Extrapyramidal abnormal movement disorder    May  7 2013  1:31PM     

 

                    B12 deficiency      2013  3:42PM     

 

                    B12 deficiency      2013  1:31PM     

 

                    Hyperlipidemia      Aug  7 2013 10:37AM     

 

                    Vitamin D Deficiency    Aug  7 2013 10:37AM     

 

                    Bipolar disorder, unspecified    Aug  7 2013 10:37AM     

 

                    Anemia, Pernicious    Aug  7 2013 10:37AM     

 

                    B12 deficiency      Aug  7 2013 10:37AM     

 

                    B12 deficiency      Sep 25 2013 11:15AM     

 

                    B12 deficiency      Dec 11 2013  3:16PM     

 

                    B12 deficiency      Mar  6 2014  1:48PM     

 

                    B12 deficiency      May 21 2014  3:17PM     

 

                    Screening Examination for Breast Cancer    2014  3:23PM     

 

                    Periumbilical abdominal pain    2014  3:23PM     

 

                    B12 deficiency      Jul 10 2014  2:52PM     

 

                    Anemia, Vitamin B12 Deficiency    Aug 13 2014  4:50PM     

 

                    Bipolar disorder    Oct 16 2014 11:13AM     

 

                    Hyperlipidemia      Oct 16 2014 11:13AM     

 

                    Anemia, Pernicious    Oct 16 2014 11:13AM     

 

                    Peritoneal Neoplasm, Malignant    Oct 16 2014 11:13AM     

 

                    Screening breast examination    Oct 16 2014 11:13AM     

 

                    Weight loss         Oct 16 2014 11:13AM     

 

                    Anemia, Pernicious    Mar 23 2015  2:57PM     

 

                    B12 deficiency      Mar 23 2015  2:57PM     

 

                    Need for Prevnar vaccine    Mar 23 2015  2:57PM     

 

                    Bipolar disorder    Mar 23 2015  2:57PM     

 

                    Hyperlipidemia      Mar 23 2015  2:57PM     

 

                    Anemia, Pernicious    Mar 23 2015  2:57PM     

 

                    Peritoneal Neoplasm, Malignant    Mar 23 2015  2:57PM     

 

                    B12 deficiency      May  4 2015  4:48PM     

 

                    Hyperlipidemia      May 13 2015  9:56AM     

 

                    Anemia              May 13 2015  9:56AM     

 

                    Bipolar disorder    May 13 2015  9:56AM     

 

                    Bipolar disorder    May 14 2015  3:27PM     

 

                    Hyperlipidemia      May 14 2015  3:27PM     

 

                    Anemia, Pernicious    May 14 2015  3:27PM     

 

                    Peritoneal Neoplasm, Malignant    May 14 2015  3:27PM     

 

                    B12 deficiency      2015  2:20PM     

 

                    B12 deficiency      2015 11:34AM     

 

                    B12 deficiency      Aug 18 2015  9:06AM     

 

                    Tinea Corporis      Sep 18 2015  8:54AM     

 

                    B12 deficiency      Sep 18 2015  8:54AM     

 

                    B12 deficiency      2015 10:28AM     

 

                    Herpes zoster without complication    Dec  3 2015  9:52AM     

 

                    B12 deficiency      Dec 23 2015 11:21AM     

 

                    B12 deficiency      2016  4:51PM     

 

                    Vitamin B 12 deficiency    Mar 14 2016  5:35PM     

 

                    B12 deficiency      Mar 15 2016 12:14PM     







Payers







           Insurance Name    Company Name    Plan Name    Plan Number    Policy Number    Policy Group

 Number                                 Start Date

 

                    Medicare Part A    Medicare Part A              [id number]              2006



 

                          [employer]    [employer]                 [id number]

                                                    

 

                    Medicare Part B    Medicare Of [place]              [id number]              2006

 

                          [employer]    [employer]                 50 percent

                                                    2009

 

                    [facility]    [employer]              [id number]              N/A

 

                    Medicare Part A    Medicare Part A              [id number]              N/A







History of Encounters







                    Visit Date          Visit Type          Provider

 

                    3/15/2016           Nurse visit         Bhupinder Louise DO

 

                    2016            Nurse visit         Bhupinder Louise DO

 

                    2015          Nurse visit         Bhupinder Aspen DO

 

                    12/3/2015           Office visit        Bhupinder Louise DO

 

                    2015          Nurse visit         Bhupinder Louise DO

 

                    2015           Office visit        Bhupinder Louise DO

 

                    2015           Nurse visit         Bhupinder Aspen DO

 

                    2015            Nurse visit         Bhupinder Aspen DO

 

                    2015            Nurse visit         Bhupinder Aspen DO

 

                    2015           Office visit        Bhupinder Aspen DO

 

                    2015            Nurse visit         Bhupinder Louise DO

 

                    3/23/2015           Office visit        Bhupinder Louise DO

 

                    10/16/2014          Office visit        Bhupinder Louise DO

 

                    2014           Nurse visit         Radha GEREN

 

                    7/10/2014           Nurse visit         Bhupinder Louise DO

 

                    2014           Office visit        Bhupinder Louise DO

 

                    2014           Nurse visit         Bhupinder Louise DO

 

                    3/6/2014            Nurse visit         Bhupinder Louise DO

 

                    2014            [facility] MD

 

                    2013          Nurse visit         Bhupinder Aspen DO

 

                    2013           Nurse visit         Bhupinder Aspen DO

 

                    2013            Office visit        Bhupinder Aspen DO

 

                    2013            Nurse visit         Bhupinder Aspen DO

 

                    2013            Nurse visit         Bhupinder Aspen DO

 

                    2013            Office visit        Bhupinder Aspen DO

 

                    4/3/2013            Nurse visit         Bhupinder Aspen DO

 

                    2013            Office visit        Bhupinder Aspen DO

 

                    10/16/2012          Nurse visit         Bhupinder Aspen DO

 

                    2012            Nurse visit         Bhupinder Aspen DO

 

                    2012            Voided              Bhupinder Aspen DO

 

                    2012            Nurse visit         Bhupinder Aspen DO

 

                    2012            Nurse visit         Bhupinder Aspen DO

 

                    2012           Nurse visit         Bhupinder Aspen DO

 

                    5/3/2012            Nurse visit         Bhupinder Aspen DO

 

                    2012           Nurse visit         Bhupinder Aspen DO

 

                    2012           Nurse visit         Bhupinder Aspen DO

 

                    2012           Nurse visit         Bhupinder Aspen DO

 

                    2011           Nurse visit         Bhupinder Aspen DO

 

                    10/20/2011          Nurse visit         Bhupinder Aspen DO

 

                    2011           Office visit        Bhupinder Aspen DO

 

                    2011           Nurse visit         Radha GREEN

 

                    2011            Office visit        Bhupinder Aspen DO

 

                    2011           Nurse visit         Bhupinder Aspen DO

 

                    2011            Office visit        Bhupinder Aspen DO

 

                    2011           Office visit        Bhupinder Aspen DO

 

                    5/10/2011           Office visit        Bhupinder Aspen DO

 

                    2011           Office visit        Bhupinder Aspen DO

 

                    4/15/2011           Office visit        Devin Angel DO

 

                    2011           Office visit        Devin Angel DO

 

                    10/15/2010          Office visit        Devin Angel DO

 

                    2010           Office visit        Devin Angel DO

 

                    2010            Office visit        Devin Angel DO

 

                    2010           Office visit        Devin Angel DO

 

                    2010            Office visit        Devin Angel DO

 

                    3/8/2010            Office visit        Devin Masterson MD

 

                    2010            Surgery             Devin Masterson MD

 

                    2010            Office visit        Devin Angel DO

 

                    2010           Surgery             Devin Masterson MD

 

                    2010           [facility]            Devin Masterson MD

 

                    2010           [facility]            Devin Masterson MD

 

                    10/22/2009          Office visit        Devin nAgel DO

## 2019-06-26 NOTE — XMS REPORT
MU2 Ambulatory Summary

                             Created on: 2017



Pauline Gan

External Reference #: [id number]

: 1950

Sex: Female



Demographics







                          Address                   [address]

[place]  [id number]

 

                          Home Phone                [phone]

 

                          Preferred Language        English

 

                          Marital Status            Legally 

 

                          Sikhism Affiliation     Unknown

 

                          Race                      White

 

                          Ethnic Group              Not  or 





Author







                          Author                    Bhupinder Louise

 

                          [facility]

 

                          Address                   [address]

[place]  [phone]



 

                          Phone                     [phone]







Care Team Providers







                    Care Team Member Name    Role                Phone

 

                    Bhupinder Louise    PCP                 Unavailable

 

                    Bhupinder Louise    PreferredProvider    Unavailable







Allergies and Adverse Reactions







                    Name                Reaction            Notes

 

                    NO KNOWN DRUG ALLERGIES                         







Plan of Treatment







             Planned Activity    Comments     Planned Date    Planned Time    Plan/Goal

 

             Injection,Subcutaneous/Intramuscul, [facility]                 2017    12:00 AM      







Medications







                                        Active 

 

             Name         Start Date    Estimated Completion Date    SIG          Comments

 

                Latuda 20 mg oral tablet                                    take 1 tablet (20 mg) by oral route once daily with

 food (at least 350 calories)            

 

             pravastatin 40 mg oral tablet    3/30/2015                 TAKE 1 TABLET BY MOUTH DAILY     

 

                Namenda XR 28 mg oral capsule,sprinkle,ER 24hr    2015                       take 1 capsule (28

 mg) by oral route once daily            

 

                Namenda XR 28 mg oral capsule,sprinkle,ER 24hr    2016                       take 1 capsule (28

 mg) by oral route once daily            

 

                potassium chloride 10 mEq oral tablet extended release    2016                       take 1 tablet

 (10 meq) by oral route once daily       

 

             pravastatin 40 mg oral tablet    2016                 TAKE 1 TABLET BY MOUTH DAILY     

 

                Vitamin B-12 1,000 mcg/mL injection solution    2016                       inject 1 milliliter 

(1,000 mcg) by intramuscular route once a month     

 

                potassium chloride 10 mEq oral tablet extended release    2016                      take 1 tablet

 (10 meq) by oral route once daily       

 

                Namenda XR 28 mg oral capsule,sprinkle,ER 24hr    2016                      TAKE 1 CAPSULE BY

 MOUTH EVERY DAY                         

 

                furosemide 40 mg oral tablet    2016                      take 1 tablet (40 mg) by oral route

 once daily                              

 

                mirtazapine 45 mg oral tablet                                    take 1 tablet (45 mg) by oral route once daily

 before bedtime                          

 

             Fish Oil 300-1,000 mg oral capsule                              take 1 capsule by oral route daily     

 

             Vitamin D3 1,000 unit oral tablet                              take 1 tablet by oral route daily     

 

                Calcium 600 600 mg calcium (1,500 mg) oral tablet                                    take 1 tablet by oral route

 daily                                   

 

                Aspirin Low Dose 81 mg oral tablet,delayed release (DR/EC)                                    take 1 tablet 

(81 mg) by oral route once daily         

 

                metoclopramide HCl 10 mg oral tablet    2017                       take 1 tablet by oral route 

2 times a day for 50 days                

 

             furosemide 40 mg oral tablet    2017                 TAKE 1 TABLET BY MOUTH DAILY     

 

                Namenda XR 28 mg oral capsule,sprinkle,ER 24hr    2017                       TAKE 1 CAPSULE BY 

MOUTH EVERY DAY                          

 

                potassium chloride 10 mEq oral tablet extended release    2017                       TAKE 1 TABLET

 BY MOUTH DAILY                          

 

                Linzess 72 mcg oral capsule    2017                       take 1 capsule (72 mcg) by oral route

 once daily on an empty stomach at least 30 minutes before 1st meal of the day     



 

             pravastatin 40 mg oral tablet    2017                 TAKE 1 TABLET BY MOUTH DAILY     









                                         

 

             Name         Start Date    Expiration Date    SIG          Comments

 

             Reglan 10mg    3/29/2010    2010    one ac and hs     

 

                Keflex 500 mg oral capsule    2010       10/1/2010       take 1 capsule (500 mg) by oral

 route every 6 hours for 10 days         

 

                Bactrim -160 mg oral tablet    2011       take 1 tablet by oral route

 every 12 hours for 7 days               

 

                triamcinolone acetonide 0.1 % topical cream    2011      apply a thin

 layer to the affected area(s) by topical route 2 times per day     

 

                sertraline 100 mg oral tablet    4/10/2012       5/10/2012       take 1.5 tablets by oral route

 daily for 30 days                       

 

                ergocalciferol (vitamin D2) 50,000 unit oral capsule    4/15/2013       2013       TAKE

 ONE CAPSULE BY MOUTH ONCE A WEEK        

 

                CYANOCOBALAM 1000MCGINJ 1000 milliliter    2013       INJECT 1ML INTRAMUSCULAR

 ONCE A MONTH                            

 

                pravastatin 40 mg oral tablet    3/25/2014       3/20/2015       TAKE ONE TABLET BY MOUTH EVERY

 DAY                                     

 

                          Zostavax (PF) 19,400 unit/0.65 mL subcutaneous suspension for reconstitution    3/23/2015

                    3/24/2015           inject 0.65 milliliter by subcutaneous route once     

 

                famciclovir 500 mg oral tablet    12/3/2015       12/10/2015      take 1 tablet (500 mg) by

 oral route every 8 hours for 7 days     

 

                furosemide 40 mg oral tablet    2016      take 1 tablet (40 mg) by oral

 route once daily                        

 

                Cipro 500 mg oral tablet    2016       take 1 tablet (500 mg) by oral route

 2 times per day for 5 days              

 

                Bactrim -160 mg oral tablet    2016        take 1 tablet by oral route

 every 12 hours for 7 days               

 

                metoclopramide HCl 10 mg oral tablet    2017       take 1 tablet by oral

 route 2 times a day for 50 days         

 

                Macrobid 100 mg oral capsule    2017       take 1 capsule (100 mg) by oral

 route 2 times per day with food for 7 days     

 

                Augmentin 875-125 mg oral tablet    2017       take 1 tablet by oral route

 every 12 hours for 7 days               

 

                amoxicillin 500 mg oral tablet    2017       take 1 tablet (500 mg) by oral

 route every 12 hours for 7 days         

 

                cefuroxime axetil 500 mg oral tablet    2017       take 1 tablet (500 mg)

 by oral route 2 times per day for 7 days     

 

                ciprofloxacin HCl 500 mg oral tablet    2017       take 1 tablet (500 mg)

 by oral route every 12 hours for 5 days     









                                        Discontinued 

 

             Name         Start Date    Discontinued Date    SIG          Comments

 

                Tylenol 325 mg oral tablet                    2013        take 1 - 2 tablets (325 -650 mg) by oral

 route every 4-6 hours as needed         

 

                Calcium 600 + D(3) 600 mg(1,500mg) -400 unit oral tablet                    2011       take 1 tablet

 by oral route 2 times a day            no longer taking

 

                Vitamin B-12 1,000 mcg oral tablet extended release    2010       take 1

 tablet by oral route daily             no longer taking

 

                Antifungal (clotrimazole) 1 % topical cream    2010       apply to the 

affected and surrounding areas of skin by topical route 2 times per day morning 
and evening                              

 

                sertraline 100 mg oral tablet    5/10/2011       2011       take 2 tablets (200 mg) by 

oral route once daily                   discontinued by Dr. Serrano

 

                mirtazapine 15 mg oral tablet                    2011        take 1 tablet (15 mg) by oral route 

once daily before bedtime               Dr. Serrano

 

                mirtazapine 15 mg oral tablet                    2011        take 1 tablet (15 mg) by oral route 

once daily before bedtime               dc'd by Dr. Serrano

 

                Pristiq 50 mg oral tablet extended release 24 hr                    2013        take 1 tablet (50

 mg) by oral route once daily           Dr. Serrano

 

                Pristiq 50 mg oral tablet extended release 24 hr                    2013        take 1 tablet (50

 mg) by oral route once daily           dose updated

 

                Vitamin B-12 1,000 mcg/mL injection solution    2011        inject 1 milliliter

 (1,000 mcg) by intramuscular route once a month    on list already

 

                    syringe with needle 1 mL 25 gauge x 1" miscellaneous syringe    2011

                          use for injection once a month     

 

                clotrimazole 1 % topical cream    2011        apply to the affected and surrounding

 areas of skin by topical route 2 times per day in the morning and evening     

 

                Vitamin D2 50,000 unit oral capsule    2011        take 1 capsule (50,000

 unit) by oral route once weekly        generic on list

 

                Pravachol 40 mg oral tablet    2012        take 1 tablet (40 mg) by oral 

route once daily for 90 days            generic on list

 

                lithium carbonate 300 mg oral capsule    2012        take 1 capsule by oral

 route daily                            dose updated

 

                Pristiq 100 mg oral tablet extended release 24 hr                    4/10/2012       take 1 and 1/2 

tablet (150 mg) by oral route once daily    Mental Health provider

 

                Pristiq 100 mg oral tablet extended release 24 hr                    4/10/2012       take 1 and 1/2 

tablet (150 mg) by oral route once daily    Discontinued by Dr Efrain Knight at [facility]

 

                hydroxyzine HCl 50 mg oral tablet    10/16/2014      2015       take 1 tablet (50 mg) 

by oral route at bedtime                 

 

                lithium carbonate 300 mg oral capsule    2015       take 1 capsule (300

 mg) by oral route 2 for 30 days         

 

                fluconazole 100 mg oral tablet    2015       12/3/2015       take 1 tablet (100 mg) by 

oral route once a week                   

 

                ketoconazole 2 % topical cream    2015       12/3/2015       apply to the affected area(s)

 by topical route 2 times per day        

 

                prednisone 10 mg oral tablet    12/3/2015       2016        take 2 tablets (20 mg) by oral

 route once daily for 4 days 1 tablet daily for 4 days 0.5 tablet daily for 4 
days                                     

 

                triamcinolone acetonide 0.1 % topical cream    2016       apply a thin layer

 to the affected area(s) by topical route 2 times per day     

 

                Cipro 500 mg oral tablet    1/15/2017       2017       take 1 tablet (500 mg) by oral route

 every 12 hours for 10 days              







Problem List







                    Description         Status              Onset

 

                    Artificial opening status; colostomy    Active               

 

                    Bipolar disorder, unspecified    Active               

 

                    Hyperlipidemia      Active               

 

                    Peritoneal Neoplasm, Malignant    Active               

 

                    Anemia, Pernicious    Active               

 

                    Arthritis unspecified    Active               

 

                    B12 deficiency      Active               







Vital Signs







      Date    Time    BP-Sys(mm[Hg]    BP-Lynn(mm[Hg])    HR(bpm)    RR(rpm)    Temp    WT    HT    HC    BMI

                    BSA                 BMI Percentile      O2 Sat(%)

 

        2017    1:34:00 PM    118 mmHg    62 mmHg    122 bpm    18 rpm    97.8 F    161.375 lbs    

69 in                     23.83 kg/m2    1.89 m2                   96 %

 

        2017    3:05:00 PM    134 mmHg    70 mmHg    70 bpm    20 rpm    97.4 F    181 lbs    69 in

                          26.7288 kg/m    1.9992 m                 98 %

 

        2017    11:07:00 AM    124 mmHg    64 mmHg    62 bpm    17 rpm    98.2 F    181.2 lbs    69

 in                       26.76 kg/m2    2.00 m2                   98 %

 

        1/15/2017    3:34:00 PM    148 mmHg    89 mmHg    69 bpm    20 rpm    98.2 F    179 lbs    69 in

                          26.4334 kg/m    1.9882 m                 98 %

 

       2017    1:51:00 PM    160 mmHg    90 mmHg    100 bpm    20 rpm    96.5 F    179 lbs             

                                                                98 %

 

       2016    3:11:00 PM    134 mmHg    76 mmHg    80 bpm    20 rpm    98 F    163 lbs    69 in     

                24.0706 kg/m    1.8972 m                      98 %

 

        2016    2:04:00 PM    142 mmHg    86 mmHg    68 bpm    16 rpm    98.5 F    166 lbs    63 in

                          29.41 kg/m2    1.83 m2                   100 %

 

        2016    11:27:00 AM    148 mmHg    78 mmHg    90 bpm    20 rpm    98.2 F    153 lbs    69 in

                          22.5939 kg/m    1.8381 m                 96 %

 

        12/3/2015    9:50:00 AM    132 mmHg    70 mmHg    62 bpm    16 rpm    97.9 F    145 lbs    69 in

                          21.41 kg/m2    1.79 m2                   100 %

 

        2015    8:52:00 AM    132 mmHg    68 mmHg    52 bpm    20 rpm    97.8 F    141 lbs    69 in

                          20.8218 kg/m    1.7645 m                 100 %

 

        2015    3:25:00 PM    120 mmHg    62 mmHg    72 bpm    16 rpm    98.1 F    136 lbs    69 in

                          20.08 kg/m2    1.73 m2                   98 %

 

       3/23/2015    2:55:00 PM    130 mmHg    76 mmHg    68 bpm    18 rpm    97 F    140 lbs    69 in    

                20.6742 kg/m    1.7583 m                      98 %

 

        10/16/2014    11:11:00 AM    120 mmHg    66 mmHg    77 bpm    20 rpm    98 F    130 lbs    69 in

                          19.20 kg/m2    1.69 m2                   100 %

 

        2014    3:21:00 PM    130 mmHg    66 mmHg    63 bpm    18 rpm    97.2 F    160 lbs    69 in

                          23.6276 kg/m    1.8797 m                 99 %

 

        2013    10:35:00 AM    132 mmHg    70 mmHg    66 bpm    20 rpm    98.1 F    157 lbs    69 in

                          23.18 kg/m2    1.86 m2                    

 

        2013    1:29:00 PM    132 mmHg    70 mmHg    76 bpm    18 rpm    98.2 F    166 lbs    69 in 

                          24.5137 kg/m    1.9146 m                  

 

       2013    2:46:00 PM    128 mmHg    70 mmHg    76 bpm    16 rpm    98 F    160 lbs    69 in     

                23.63 kg/m2     1.88 m2                          

 

        2011    8:49:00 AM    128 mmHg    78 mmHg    70 bpm    18 rpm    97.9 F    164 lbs    69 in

                          24.2183 kg/m    1.903 m                  

 

     2011    1:31:00 PM    132 mmHg    68 mmHg    84 bpm         97 F    167 lbs                        

                                         

 

        2011    9:09:00 AM    128 mmHg    70 mmHg    72 bpm    18 rpm    98.2 F    163 lbs    64 in 

                          27.9786 kg/m    1.8272 m                  

 

       2011    10:01:00 AM    132 mmHg    70 mmHg    72 bpm    18 rpm    98.2 F    154 lbs             

                                                                 

 

       2011    2:47:00 PM    128 mmHg    70 mmHg    72 bpm    18 rpm    97.8 F    156 lbs             

                                                                 

 

       5/10/2011    3:16:00 PM    144 mmHg    80 mmHg    72 bpm    18 rpm    98.2 F    158 lbs             

                                                                 

 

        2011    10:11:00 AM    132 mmHg    70 mmHg    70 bpm    18 rpm    98.2 F    168 lbs    69 in

                          24.809 kg/m    1.9261 m                  

 

        4/15/2011    10:52:00 AM    110 mmHg    60 mmHg    75 bpm    16 rpm    97.5 F    172.375 lbs    

69 in                     25.46 kg/m2    1.95 m2                   100 %

 

        2011    11:43:00 AM    120 mmHg    82 mmHg    75 bpm    16 rpm    97.2 F    178.5 lbs    69

 in                       26.3596 kg/m    1.9854 m                 100 %

 

        10/15/2010    1:32:00 PM    120 mmHg    70 mmHg    80 bpm    18 rpm    96.6 F    177 lbs    69 in

                          26.14 kg/m2    1.98 m2                   100 %

 

        2010    3:50:00 PM    168 mmHg    100 mmHg    82 bpm    18 rpm    97.8 F    177.5 lbs    69

 in                       26.2119 kg/m    1.9798 m                 97 %

 

        2010    1:21:00 PM    140 mmHg    80 mmHg    59 bpm    16 rpm    97.6 F    173.25 lbs    69 

in                        25.58 kg/m2    1.96 m2                   100 %

 

        2010    3:02:00 PM    140 mmHg    80 mmHg    61 bpm    16 rpm    97.6 F    173.125 lbs    69

 in                       25.5658 kg/m    1.9553 m                 99 %

 

        2010    1:23:00 PM    130 mmHg    80 mmHg    66 bpm    16 rpm    96.8 F    173 lbs    69 in 

                          25.55 kg/m2    1.95 m2                   100 %

 

        2010    12:58:00 PM    130 mmHg    88 mmHg    75 bpm    16 rpm    98.4 F    172.25 lbs    69

 in                       25.4366 kg/m    1.9503 m                 100 %







Social History







                    Name                Description         Comments

 

                    denies alcohol use                         

 

                    denies smoking                           

 

                    Denies illicit substance abuse                         

 

                    retired                                 direct care

 

                    Single                                   

 

                    Exercises regularly                         

 

                    Attended some college                         

 

                    Tobacco             Never smoker         







History of Procedures







                    Date Ordered        Description         Order Status

 

                    2010 12:00 AM    COMPREHEN METABOLIC PANEL    Reviewed

 

                    2010 12:00 AM    COMPLETE CBC W/AUTO DIFF WBC    Reviewed

 

                    2010 12:00 AM    LIPID PANEL         Reviewed

 

                          2015 12:00 AM        B12 Injection, Up to 1000 Mcg NDC#6412-5718-31 [facility] 

                                        Reviewed

 

                    2011 12:00 AM    MAMMOGRAM SCREENING    Reviewed

 

                    2011 12:00 AM    CYTOPATH C/V THIN LAYER    Reviewed

 

                    2011 12:00 AM    B12 Injection 1 cc NDC#[address]-79    Reviewed

 

                    2015 12:00 AM    THER/PROPH/DIAG INJ SC/IM    Reviewed

 

                    2015 12:00 AM    B12 Injection, Up to 1000 Mcg NDC#7141-9799-04    Reviewed

 

                    2011 12:00 AM    THER/PROPH/DIAG INJ SC/IM    Reviewed

 

                    2011 12:00 AM    B12 Injection([place]) Ndc#4103-4590-75-    Reviewed

 

                    2015 12:00 AM    THER/PROPH/DIAG INJ SC/IM    Reviewed

 

                    2015 12:00 AM    B12 Injection, Up to 1000 Mcg NDC#3739-2779-65    Reviewed

 

                    10/20/2011 12:00 AM    THER/PROPH/DIAG INJ SC/IM    Reviewed

 

                    10/20/2011 12:00 AM    B12 Injection([place]) Ndc#3233-2477-80-    Reviewed

 

                    2016 12:00 AM    THER/PROPH/DIAG INJ SC/IM    Reviewed

 

                    2016 12:00 AM    B12 Injection, Up to 1000 Mcg NDC#5941-9297-99    Reviewed

 

                    3/14/2016 12:00 AM    VITAMIN B-12        Reviewed

 

                    3/15/2016 12:00 AM    THER/PROPH/DIAG INJ SC/IM    Reviewed

 

                    3/15/2016 12:00 AM    B12 Injection, Up to 1000 Mcg NDC#2021-9396-35    Reviewed

 

                    2011 12:00 AM    ***Immunization administration, Medicare flu    Reviewed

 

                    2011 12:00 AM    Fluzone ** MEDICARE Only **    Reviewed

 

                    2011 12:00 AM    THER/PROPH/DIAG INJ SC/IM    Reviewed

 

                    2011 12:00 AM    B12 Injection ([employer]) Ndc#6374-3482-40    Reviewed

 

                    2016 12:00 AM    B12 Injection, Up to 1000 Mcg NDC#7584-7103-98 [facility] Medicare    

Reviewed

 

                    2016 12:00 AM    TTE W/DOPPLER COMPLETE    Reviewed

 

                    2016 12:00 AM    EXTREMITY STUDY     Reviewed

 

                          2016 12:00 AM        B12 Injection, Up to 1000 Mcg NDC#3467-3295-60 [facility] Medicare 

                                        Reviewed

 

                    2016 12:00 AM    THER/PROPH/DIAG INJ SC/IM    Reviewed

 

                    2016 12:00 AM    B12 Injection, Up to 1000 Mcg NDC#9381-3166-76    Reviewed

 

                    2016 12:00 AM    THER/PROPH/DIAG INJ SC/IM    Reviewed

 

                    2012 12:00 AM    B12 Injection([place]) Ndc#8750-0427-23-    Reviewed

 

                    2016 12:00 AM    B12 Injection, Up to 1000 Mcg NDC#6058-3236-98    Reviewed

 

                    2016 12:00 AM    THER/PROPH/DIAG INJ SC/IM    Reviewed

 

                    2012 12:00 AM    THER/PROPH/DIAG INJ SC/IM    Reviewed

 

                    2012 12:00 AM    B12 Injection ([employer]) Ndc#6697-8484-42    Reviewed

 

                    2016 12:00 AM    THER/PROPH/DIAG INJ SC/IM    Reviewed

 

                    2016 12:00 AM    B12 Injection, Up to 1000 Mcg NDC#0994-9790-76    Reviewed

 

                    2016 12:00 AM    B12 Injection, Up to 1000 Mcg NDC#6536-2692-55    Reviewed

 

                    2016 12:00 AM    THER/PROPH/DIAG INJ SC/IM    Reviewed

 

                    2012 12:00 AM    THER/PROPH/DIAG INJ SC/IM    Reviewed

 

                    2012 12:00 AM    B12 Injection([place]ella) Ndc#5721-9786-15-    Reviewed

 

                    12/15/2016 12:00 AM    B12 Injection, Up to 1000 Mcg NDC#7503-3435-20    Reviewed

 

                    12/15/2016 12:00 AM    THER/PROPH/DIAG INJ SC/IM    Reviewed

 

                    2016 12:00 AM    URNLS DIP STICK/TABLET RGNT AUTO W/O MICROSCOPY    Reviewed

 

                    1/3/2017 12:00 AM    URNLS DIP STICK/TABLET RGNT AUTO W/O MICROSCOPY    Reviewed

 

                    2017 12:00 AM    URINE CULTURE/COLONY COUNT    Reviewed

 

                    2017 12:00 AM    Rocephin 1 gram Injection, [facility]    Reviewed

 

                    2017 12:00 AM    THERAPEUTIC PROPHYLACTIC/DX INJECTION SUBQ/IM    Reviewed

 

                    2017 12:00 AM    B12 1000mcg Injection, [facility]    Reviewed

 

                    5/3/2012 12:00 AM    THER/PROPH/DIAG INJ SC/IM    Reviewed

 

                    5/3/2012 12:00 AM    B12 Injection([facility]) Ndc#9398-8934-49-    Reviewed

 

                    2017 12:00 AM    THERAPEUTIC PROPHYLACTIC/DX INJECTION SUBQ/IM    Reviewed

 

                    2017 12:00 AM    B12 1000mcg Injection, [facility] Medicare    Reviewed

 

                    2017 12:00 AM    MRI BRAIN STEM W/O & W/DYE    Reviewed

 

                    2017 12:00 AM    VITAMIN B-12        Reviewed

 

                    2017 12:00 AM    Speech Therapy Consult    Reviewed

 

                    2017 12:00 AM    ASSAY OF CREATININE    Reviewed

 

                    2012 12:00 AM    IMMUNOTHERAPY INJECTIONS    Reviewed

 

                    2012 12:00 AM    B12 Injection([facility]) Ndc#9158-4331-61-    Reviewed

 

                    2017 12:00 AM    URINALYSIS AUTO W/SCOPE    Reviewed

 

                    2012 12:00 AM    THER/PROPH/DIAG INJ SC/IM    Reviewed

 

                    2012 12:00 AM    B12 Injection, Up to 1000 Mcg NDC#5781-9805-42    Reviewed

 

                    2017 12:00 AM    URINALYSIS AUTO W/SCOPE    Reviewed

 

                    2017 2:18 PM    URINALYSIS AUTO W/O SCOPE    Reviewed

 

                    2017 12:00 AM    URINE CULTURE/COLONY COUNT    Reviewed

 

                    2017 12:00 AM    B12 1000mcg Injection    Reviewed

 

                    2017 12:00 AM    URNLS DIP STICK/TABLET RGNT AUTO W/O MICROSCOPY    Returned

 

                    2012 12:00 AM    THER/PROPH/DIAG INJ SC/IM    Reviewed

 

                    2012 12:00 AM    B12 Injection, Up to 1000 Mcg NDC#3928-9270-79    Reviewed

 

                    2012 12:00 AM    THER/PROPH/DIAG INJ SC/IM    Reviewed

 

                    2012 12:00 AM    B12 Injection, Up to 1000 Mcg NDC#2269-1497-88    Reviewed

 

                    10/16/2012 12:00 AM    THER/PROPH/DIAG INJ SC/IM    Reviewed

 

                    10/16/2012 12:00 AM    B12 Injection, Up to 1000 Mcg NDC#0482-3863-94    Reviewed

 

                    2010 12:00 AM    COMPREHEN METABOLIC PANEL    Reviewed

 

                    2010 12:00 AM    COMPLETE CBC W/AUTO DIFF WBC    Reviewed

 

                    2010 12:00 AM    LIPID PANEL         Reviewed

 

                    2013 12:00 AM    Flu Injection 3 Years And Above NDC# [address]-86  [facility]    Reviewed



 

                    2013 12:00 AM    COMPLETE CBC W/AUTO DIFF WBC    Reviewed

 

                    2013 12:00 AM    ASSAY OF LITHIUM    Reviewed

 

                    2013 12:00 AM    METABOLIC PANEL TOTAL CA    Reviewed

 

                    4/3/2013 12:00 AM    THER/PROPH/DIAG INJ SC/IM    Reviewed

 

                    4/3/2013 12:00 AM    B12 Injection, Up to 1000 Mcg NDC#9368-6698-72    Reviewed

 

                    2013 12:00 AM    THER/PROPH/DIAG INJ SC/IM    Reviewed

 

                    2013 12:00 AM    B12 Injection, Up to 1000 Mcg NDC#0757-6986-25    Reviewed

 

                    2013 12:00 AM    THER/PROPH/DIAG INJ SC/IM    Reviewed

 

                    2013 12:00 AM    B12 Injection, Up to 1000 Mcg NDC#4356-6835-14    Reviewed

 

                    2013 12:00 AM    LIPID PANEL         Reviewed

 

                    2013 12:00 AM    VITAMIN D 25 HYDROXY    Reviewed

 

                    2013 12:00 AM    THER/PROPH/DIAG INJ SC/IM    Reviewed

 

                    2013 12:00 AM    B12 Injection, Up to 1000 Mcg NDC#1025-4353-96    Reviewed

 

                    2013 12:00 AM    THER/PROPH/DIAG INJ SC/IM    Reviewed

 

                    3/6/2014 12:00 AM    THER/PROPH/DIAG INJ SC/IM    Reviewed

 

                    2014 12:00 AM    THER/PROPH/DIAG INJ SC/IM    Reviewed

 

                    2014 12:00 AM    B12 Injection, Up to 1000 Mcg NDC#8412-2756-92    Reviewed

 

                    2010 12:00 AM    SKIN FUNGI CULTURE    Reviewed

 

                    10/9/2010 12:00 AM    COMPREHEN METABOLIC PANEL    Reviewed

 

                    10/9/2010 12:00 AM    LIPID PANEL         Reviewed

 

                    2010 12:00 AM    THER/PROPH/DIAG INJ SC/IM    Reviewed

 

                    2010 12:00 AM    B12 Injection Ndc#[address]-80 ([facility])    Reviewed

 

                    2010 12:00 AM    THER/PROPH/DIAG INJ SC/IM    Reviewed

 

                    2010 12:00 AM    Kenalog 40 Mg Im-Nd#[address]-55 ([facility])    Reviewed

 

                    10/15/2010 12:00 AM    FLU VACCINE 3 YRS & > IM    Reviewed

 

                    10/15/2010 12:00 AM    Admin.Of M/C Cov.Vaccine-Flu Vacc.    Reviewed

 

                    1/15/2011 12:00 AM    COMPLETE CBC W/AUTO DIFF WBC    Reviewed

 

                    1/15/2011 12:00 AM    COMPREHEN METABOLIC PANEL    Reviewed

 

                    1/15/2011 12:00 AM    LIPID PANEL         Reviewed

 

                    2014 12:00 AM    MAMMOGRAM SCREENING    Reviewed

 

                    2014 12:00 AM    Screening mammography, bilateral    Reviewed

 

                    7/10/2014 12:00 AM    THER/PROPH/DIAG INJ SC/IM    Reviewed

 

                    7/10/2014 12:00 AM    B12 Injection, Up to 1000 Mcg NDC#7313-8707-93    Reviewed

 

                    2011 12:00 AM    COMPLETE CBC W/AUTO DIFF WBC    Reviewed

 

                    2011 12:00 AM    COMPREHEN METABOLIC PANEL    Reviewed

 

                    2011 12:00 AM    LIPID PANEL         Reviewed

 

                    2014 12:00 AM    B12 Injection, Up to 1000 Mcg NDC#4467-0249-36    Reviewed

 

                    10/19/2014 12:00 AM    MAMMOGRAM SCREENING    Reviewed

 

                    10/19/2014 12:00 AM    Screening mammography, bilateral    Reviewed

 

                    10/16/2014 12:00 AM    B12 Injection, Up to 1000 Mcg NDC#9199-3658-54    Reviewed

 

                    10/16/2014 12:00 AM    COMPLETE CBC W/AUTO DIFF WBC    Reviewed

 

                    10/16/2014 12:00 AM    COMPREHEN METABOLIC PANEL    Reviewed

 

                    10/16/2014 12:00 AM    IMMUNOASSAY TUMOR     Reviewed

 

                    10/16/2014 12:00 AM    LIPID PANEL         Reviewed

 

                    10/16/2014 12:00 AM    ASSAY OF LITHIUM    Reviewed

 

                    10/16/2014 12:00 AM    MAMMOGRAM SCREENING    Reviewed

 

                    2011 12:00 AM    ASSAY OF PARATHORMONE    Reviewed

 

                    2011 12:00 AM    VITAMIN D 25 HYDROXY    Reviewed

 

                    2011 12:00 AM    ASSAY OF LITHIUM    Reviewed

 

                    2011 12:00 AM    METABOLIC PANEL TOTAL CA    Reviewed

 

                    2011 12:00 AM    CT HEAD/BRAIN W/O & W/DYE    Reviewed

 

                    3/23/2015 12:00 AM    PNEUMOCOCCAL VACC 13 GLENDY IM    Reviewed

 

                    3/23/2015 12:00 AM    Vitamin B12 injection    Reviewed

 

                    2011 12:00 AM    ASSAY OF LITHIUM    Reviewed

 

                    2011 12:00 AM    B12 Injection Ndc#[address]-69  [place]    Reviewed

 

                    2015 12:00 AM    THER/PROPH/DIAG INJ SC/IM    Reviewed

 

                    2015 12:00 AM    B12 Injection, Up to 1000 Mcg NDC#4660-8335-18    Reviewed

 

                    2015 12:00 AM    COMPLETE CBC W/AUTO DIFF WBC    Reviewed

 

                    2015 12:00 AM    COMPREHEN METABOLIC PANEL    Reviewed

 

                    2015 12:00 AM    LIPID PANEL         Reviewed

 

                    2015 12:00 AM    ASSAY OF LITHIUM    Reviewed

 

                    2011 12:00 AM    VIT D 1 25-DIHYDROXY    Reviewed

 

                    2011 12:00 AM    VITAMIN B-12        Reviewed

 

                    2015 12:00 AM    B12 Injection, Up to 1000 Mcg NDC#6241-3839-23    Reviewed

 

                    2015 12:00 AM    THER/PROPH/DIAG INJ SC/IM    Reviewed

 

                    2015 12:00 AM    B12 Injection, Up to 1000 Mcg NDC#9256-8394-39    Reviewed

 

                    2011 12:00 AM    THER/PROPH/DIAG INJ SC/IM    Reviewed

 

                    2011 12:00 AM    B12 Injection ([facility]) Ndc#1466-4034-31    Reviewed

 

                    2015 12:00 AM    THER/PROPH/DIAG INJ SC/IM    Reviewed

 

                    2015 12:00 AM    B12 Injection, Up to 1000 Mcg NDC#3777-4502-48    Reviewed







Results Summary







                          Date and Description      Results

 

                          2004 12:00 AM        Colonoscopy-Women and Men over 50 Normal 

 

                          2008 12:00 AM         Pap Smear Declined 

 

                          10/7/2009 12:00 AM        Cholest Cry Stone Ql .0 %LDLc SerPl-mCnc 123.0 mg/dLHDLc

 SerPl-mCnc 34.0 mg/dLTrigl SerPl-mCnc 190.0 mg/dLGlucose SerPl-mCnc 78.0 mg/dL

 

                          2009 12:00 AM        Mammogram -Women over 40 Normal HIV1+2 Ab Ser Ql no risk 

 

                          2010 8:47 AM         Dexa Bone Scan Refused Aspirin reccommended Contraindication 



 

                          2010 8:48 AM         Depression Done 

 

                          2010 12:00 AM         Foot Exam-Diabetic Done 

 

                          2010 12:00 AM         Cholest Cry Stone Ql .0 %LDLc SerPl-mCnc 126.0 mg/dLGlucose

 SerPl-mCnc 102.0 mg/dL

 

                          2010 8:45 AM          TRIGLYCERIDES 122.0 mg/dLCHOLESTEROL 186.0 mg/dLHDL 36.0 mg/dLTOT

 CHOL/HDL 5.2 LDL (CALC) 126.0 mg/dLGLUCOSE 102.0 mg/dLSODIUM 143.0 
mmol/LPOTASSIUM 3.70 mmol/LCHLORIDE 111.0 mmol/LCO2 23.0 mmol/LBUN 10.0 
mg/dLCREATININE 0.80 mg/dLSGOT/AST 12.0 IU/LSGPT/ALT 11.0 IU/LALK PHOS 65.0 
IU/LTOTAL PROTEIN 7.20 g/dLALBUMIN 3.90 g/dLTOTAL BILI 0.50 mg/dLCALCIUM 10.20 
mg/dLAGE 59 GFR NonAA 73 GFR AA 88 eGFR >60 mL/min/1.73 m2eGFR AA* >60 WBC 5.7 
RBC 3.26 HGB 10.60 g/dLHCT 31.70 %MCV 97.0 fLMCH 32.50 pgMCHC 33.40 g/dLRDW SD 
47 RDW CV 13.30 %MPV 9.70 fLPLT 287 NRBC# 0.00 NRBC% 0.0 %NEUT 62.90 %%LYMP 
21.80 %%MONO 9.90 %%EOS 5.0 %%BASO 0.40 %#NEUT 3.56 #LYMP 1.23 #MONO 0.56 #EOS 
0.28 #BASO 0.02 MANUAL DIFF NOT IND 

 

                          2010 12:00 AM        Glucose SerPl-mCnc 96.0 mg/dLCholest Cry Stone Ql .0 %LDLc

 SerPl-mCnc 146.0 mg/dL

 

                          2010 8:26 AM         TRIGLYCERIDES 106.0 mg/dLCHOLESTEROL 199.0 mg/dLHDL 32.0 mg/dLTOT

 CHOL/HDL 6.2 LDL (CALC) 146.0 mg/dLGLUCOSE 96.0 mg/dLSODIUM 143.0 
mmol/LPOTASSIUM 4.0 mmol/LCHLORIDE 113.0 mmol/LCO2 24.0 mmol/LBUN 13.0 
mg/dLCREATININE 1.0 mg/dLSGOT/AST 11.0 IU/LSGPT/ALT 6.0 IU/LALK PHOS 56.0 
IU/LTOTAL PROTEIN 6.60 g/dLALBUMIN 3.80 g/dLTOTAL BILI 0.50 mg/dLCALCIUM 9.30 
mg/dLAGE 59 GFR NonAA 57 GFR AA 69 eGFR 57 eGFR AA* >60 

 

                          10/6/2010 12:00 AM        Cholest Cry Stone Ql .0 %LDLc SerPl-mCnc 111.0 mg/dLGlucose

 SerPl-mCnc 81.0 mg/dL

 

                          10/6/2010 2:45 PM         TRIGLYCERIDES 123.0 mg/dLCHOLESTEROL 178.0 mg/dLHDL 42.0 mg/dLTOT

 CHOL/HDL 4.2 LDL (CALC) 111.0 mg/dLGLUCOSE 81.0 mg/dLSODIUM 139.0 
mmol/LPOTASSIUM 4.10 mmol/LCHLORIDE 106.0 mmol/LCO2 24.0 mmol/LBUN 13.0 
mg/dLCREATININE 0.90 mg/dLSGOT/AST 13.0 IU/LSGPT/ALT 11.0 IU/LALK PHOS 61.0 
IU/LTOTAL PROTEIN 7.10 g/dLALBUMIN 3.90 g/dLTOTAL BILI 0.30 mg/dLCALCIUM 9.30 
mg/dLAGE 60 GFR NonAA 64 GFR AA 78 eGFR >60 mL/min/1.73 m2eGFR AA* >60 WBC 6.9 
RBC 3.59 HGB 11.50 g/dLHCT 35.30 %MCV 98.0 fLMCH 32.0 pgMCHC 32.60 g/dLRDW SD 46
 RDW CV 12.90 %MPV 9.90 fLPLT 311 NRBC# 0.00 NRBC% 0.0 %NEUT 64.90 %%LYMP 22.50 
%%MONO 7.20 %%EOS 5.10 %%BASO 0.30 %#NEUT 4.45 #LYMP 1.54 #MONO 0.49 #EOS 0.35 
#BASO 0.02 MANUAL DIFF NOT IND 

 

                          2011 12:00 AM         Mammogram -Women over 40 Ordered 

 

                          2011 10:25 AM        TRIGLYCERIDES 111.0 mg/dLCHOLESTEROL 195.0 mg/dLHDL 43.0 mg/dLTOT

 CHOL/HDL 4.5 LDL (CALC) 130.0 mg/dLWBC 5.3 RBC 3.76 HGB 12.0 g/dLHCT 37.80 %MCV
 101.0 fLMCH 31.90 pgMCHC 31.70 g/dLRDW SD 47 RDW CV 13.0 %MPV 9.70 fLPLT 259 
NRBC# 0.00 NRBC% 0.0 %NEUT 69.0 %%LYMP 17.60 %%MONO 8.30 %%EOS 4.70 %%BASO 0.40 
%#NEUT 3.63 #LYMP 0.93 #MONO 0.44 #EOS 0.25 #BASO 0.02 MANUAL DIFF NOT IND 
GLUCOSE 102.0 mg/dLSODIUM 146.0 mmol/LPOTASSIUM 4.20 mmol/LCHLORIDE 113.0 
mmol/LCO2 23.0 mmol/LBUN 15.0 mg/dLCREATININE 1.0 mg/dLSGOT/AST 12.0 
IU/LSGPT/ALT 17.0 IU/LALK PHOS 60.0 IU/LTOTAL PROTEIN 6.90 g/dLALBUMIN 4.20 
g/dLTOTAL BILI 0.40 mg/dLCALCIUM 9.70 mg/dLAGE 60 GFR NonAA 57 GFR AA 69 eGFR 57
 eGFR AA* >60 

 

                          2011 11:49 AM        Cholest Cry Stone Ql .0 %LDLc SerPl-mCnc 130.0 mg/dLHDLc

 SerPl-mCnc 43.0 mg/dLTrigl SerPl-mCnc 111.0 mg/dLGlucose SerPl-mCnc 102.0 mg/dL

 

                          2011 11:52 AM        Pap Smear Declined 

 

                          2011 11:28 AM        Lithium 2.080 mmol/LGLUCOSE 102.0 mg/dLSODIUM 135.0 mmol/LPOTASSIUM

 3.90 mmol/LCHLORIDE 106.0 mmol/LCO2 21.0 mmol/LBUN 12.0 mg/dLCREATININE 1.30 
mg/dLCALCIUM 10.70 mg/dLAGE 60 GFR NonAA 42 GFR AA 51 eGFR 42 eGFR AA* 51 

 

                          2011 8:58 AM          Lithium 0.690 mmol/L

 

                          2011 2:38 PM         VITAMIN B12 3483.0 pg/mL

 

                          2013 3:35 PM          WBC 5.1 RBC 3.73 HGB 11.70 g/dLHCT 36.40 %MCV 98.0 fLMCH 31.40

 pgMCHC 32.10 g/dLRDW SD 47 RDW CV 13.10 %MPV 9.80 fLPLT 224 NRBC# 0.00 NRBC% 
0.0 %NEUT 66.80 %%LYMP 19.10 %%MONO 9.0 %%EOS 4.90 %%BASO 0.20 %#NEUT 3.42 #LYMP
 0.98 #MONO 0.46 #EOS 0.25 #BASO 0.01 MANUAL DIFF NOT IND GLUCOSE 88.0 
mg/dLSODIUM 141.0 mmol/LPOTASSIUM 4.10 mmol/LCHLORIDE 110.0 mmol/LCO2 22.0 
mmol/LBUN 22.0 mg/dLCREATININE 1.10 mg/dLCALCIUM 9.80 mg/dLAGE 62 GFR NonAA 50 
GFR AA 61 eGFR 50 eGFR AA* 60 Lithium 0.760 mmol/L

 

                          2013 11:02 AM        TRIGLYCERIDES 106.0 mg/dLCHOLESTEROL 181.0 mg/dLHDL 46.0 mg/dLTOT

 CHOL/HDL 3.9 LDL (CALC) 114.0 mg/dLVITAMIN D 41.10 ng/mL

 

                          10/17/2014 10:10 AM       WBC 5.0 RBC 3.66 HGB 11.60 g/dLHCT 36.80 %.0 fLMCH 31.70

 pgMCHC 31.50 g/dLRDW SD 50 RDW CV 13.50 %MPV 10.10 fLPLT 209 NRBC# 0.00 NRBC% 
0.0 %NEUT 69.20 %%LYMP 21.0 %%MONO 6.40 %%EOS 3.20 %%BASO 0.20 %#NEUT 3.46 #LYMP
 1.05 #MONO 0.32 #EOS 0.16 #BASO 0.01 MANUAL DIFF NOT IND GLUCOSE 100.0 
mg/dLSODIUM 148.0 mmol/LPOTASSIUM 3.90 mmol/LCHLORIDE 114.0 mmol/LCO2 26.0 
mmol/LBUN 12.0 mg/dLCREATININE 1.20 mg/dLSGOT/AST 9.0 IU/LSGPT/ALT <6 IU/LALK 
PHOS 82.0 IU/LTOTAL PROTEIN 6.90 g/dLALBUMIN 4.0 g/dLTOTAL BILI 0.40 
mg/dLCALCIUM 10.50 mg/dLAGE 64 GFR NonAA 45 GFR AA 55 eGFR 45 eGFR AA* 55 
TRIGLYCERIDES 96.0 mg/dLCHOLESTEROL 155.0 mg/dLHDL 38.0 mg/dLTOT CHOL/HDL 4.1 
LDL (CALC) 98.0 mg/dLLithium 0.850 mmol/LCancer Antigen (CA) 125 8.30 U/mL

 

                          2015 10:25 AM        Lithium 0.790 mmol/LWBC 4.8 RBC 3.44 HGB 11.0 g/dLHCT 35.20 

%.0 fLMCH 32.0 pgMCHC 31.30 g/dLRDW SD 53 RDW CV 14.0 %MPV 9.30 fLPLT 210
 NRBC# 0.00 NRBC% 0.0 %NEUT 70.80 %%LYMP 17.20 %%MONO 8.10 %%EOS 3.50 %%BASO 
0.40 %#NEUT 3.41 #LYMP 0.83 #MONO 0.39 #EOS 0.17 #BASO 0.02 MANUAL DIFF NOT IND 
TRIGLYCERIDES 107.0 mg/dLCHOLESTEROL 174.0 mg/dLHDL 43.0 mg/dLTOT CHOL/HDL 4.0 
LDL (CALC) 110.0 mg/dLGLUCOSE 90.0 mg/dLSODIUM 145.0 mmol/LPOTASSIUM 3.80 
mmol/LCHLORIDE 115.0 mmol/LCO2 24.0 mmol/LBUN 17.0 mg/dLCREATININE 1.30 
mg/dLSGOT/AST 18.0 IU/LSGPT/ALT 17.0 IU/LALK PHOS 56.0 IU/LTOTAL PROTEIN 6.70 
g/dLALBUMIN 3.90 g/dLTOTAL BILI 0.40 mg/dLCALCIUM 9.80 mg/dLAGE 64 GFR NonAA 41 
GFR AA 50 eGFR 41 eGFR AA* 50 

 

                          2015 8:50 AM        WBC 5.8 RBC 3.29 HGB 10.70 g/dLHCT 34.0 %.0 fLMCH 32.50

 pgMCHC 31.50 g/dLRDW SD 52 RDW CV 13.60 %MPV 9.60 fLPLT 223 NRBC# 0.00 NRBC% 
0.0 %NEUT 69.60 %%LYMP 18.90 %%MONO 8.50 %%EOS 2.80 %%BASO 0.20 %#NEUT 4.03 
#LYMP 1.09 #MONO 0.49 #EOS 0.16 #BASO 0.01 MANUAL DIFF NOT IND Lithium 0.620 
mmol/LGLUCOSE 83.0 mg/dLSODIUM 139.0 mmol/LPOTASSIUM 3.90 mmol/LCHLORIDE 109.0 
mmol/LCO2 22.0 mmol/LBUN 19.0 mg/dLCREATININE 1.40 mg/dLSGOT/AST 19.0 
IU/LSGPT/ALT 21.0 IU/LALK PHOS 55.0 IU/LTOTAL PROTEIN 6.50 g/dLALBUMIN 3.90 
g/dLTOTAL BILI 0.50 mg/dLCALCIUM 9.60 mg/dLAGE 65 GFR NonAA 38 GFR AA 46 eGFR 38
 eGFR AA* 46 TRIGLYCERIDES 121.0 mg/dLCHOLESTEROL 192.0 mg/dLHDL 51.0 mg/dLTOT 
CHOL/HDL 3.8 .0 mg/dLFREE T4 0.79 TSH 1.210 uIU/mLHemoglobin A1c 5.40 
%Estim. Avg Glu (eAG) 108 

 

                          3/15/2016 8:08 AM         VITAMIN B12 696.0 pg/mL

 

                          3/23/2016 8:26 AM         WBC 7.0 RBC 3.61 HGB 11.80 g/dLHCT 37.70 %.0 fLMCH 32.70

 pgMCHC 31.30 g/dLRDW SD 49 RDW CV 12.50 %MPV 10.0 fLPLT 207 NRBC# 0.00 NRBC% 
0.0 %NEUT 73.60 %%LYMP 16.40 %%MONO 6.60 %%EOS 3.0 %%BASO 0.30 %#NEUT 5.15 #LYMP
 1.15 #MONO 0.46 #EOS 0.21 #BASO 0.02 MANUAL DIFF NOT IND Lithium 0.940 
mmol/LGLUCOSE 108.0 mg/dLSODIUM 143.0 mmol/LPOTASSIUM 4.30 mmol/LCHLORIDE 110.0 
mmol/LCO2 27.0 mmol/LBUN 16.0 mg/dLCREATININE 1.60 mg/dLSGOT/AST 13.0 
IU/LSGPT/ALT 7.0 IU/LALK PHOS 71.0 IU/LTOTAL PROTEIN 6.80 g/dLALBUMIN 4.0 
g/dLTOTAL BILI 0.20 mg/dLCALCIUM 10.40 mg/dLAGE 65 GFR NonAA 32 GFR AA 39 eGFR 
32 eGFR AA* 39 TRIGLYCERIDES 113.0 mg/dLCHOLESTEROL 169.0 mg/dLHDL 42.0 mg/dLTOT
 CHOL/HDL 4.0 LDL (CALC) 104.0 mg/dLFREE T4 0.86 TSH 2.20 uIU/mLHemoglobin A1c 
5.20 %Estim. Avg Glu (eAG) 103 

 

                          3/25/2016 9:17 AM         COLOR YELLOW APPEARANCE CLEAR SPEC GRAV 1.010 pH 7.0 PROTEIN 

NEGATIVE GLUCOSE NEGATIVE mg/dLKETONE NEGATIVE BILIRUBIN NEGATIVE BLOOD NEGATIVE
 NITRITE NEGATIVE [place] SCREEN SMALL MICRO IND? SEE BELOW WBC/HPF 0-5 RBC/HPF 
NEGATIVE CASTS/LPF NEGATIVE /LPFCRYSTALS NEGATIVE MUCOUS THRDS NEGATIVE BACTERIA
 NEGATIVE EPITH CELLS FEW SQUAMOUS /HPFTRICHOMONAS NEGATIVE YEAST NEGATIVE 

 

                          2016 6:00 AM        GLUCOSE 91.0 mg/dLSODIUM 143.0 mmol/LPOTASSIUM 3.60 mmol/LCHLORIDE

 112.0 mmol/LCO2 23.0 mmol/LBUN 22.0 mg/dLCREATININE 1.20 mg/dLSGOT/AST 15.0 
IU/LSGPT/ALT 12.0 IU/LALK PHOS 61.0 IU/LTOTAL PROTEIN 5.40 g/dLALBUMIN 3.10 
g/dLTOTAL BILI 0.40 mg/dLCALCIUM 8.40 mg/dLAGE 66 GFR NonAA 45 GFR AA 55 eGFR 45
 eGFR AA* 55 WBC 3.0 RBC 3.05 HGB 9.80 g/dLHCT 32.10 %.0 fLMCH 32.10 
pgMCHC 30.50 g/dLRDW SD 54 RDW CV 14.20 %MPV 10.10 fLPLT 170 NRBC# 0.00 NRBC% 
0.0 %NEUT 50.70 %%LYMP 32.60 %%MONO 10.50 %%EOS 5.90 %%BASO 0.30 %#NEUT 1.54 
#LYMP 0.99 #MONO 0.32 #EOS 0.18 #BASO 0.01 MANUAL DIFF NOT IND 

 

                          2016 2:09 PM        COLOR YELLOW APPEARANCE CLEAR SPEC GRAV 1.010 pH 5.0 PROTEIN

 30 GLUCOSE NEGATIVE mg/dLKETONE NEGATIVE BILIRUBIN NEGATIVE BLOOD LARGE NITRITE
 NEGATIVE [place] SCREEN MODERATE MICRO INDICATED? SEE BELOW WBC/HPF  RBC/HPF
 20-50 CASTS/LPF NEGATIVE /LPFCRYSTALS NEGATIVE MUCOUS THRDS NEGATIVE BACTERIA 
NEGATIVE EPITH CELLS FEW SQUAMOUS /HPFTRICHOMONAS NEGATIVE YEAST NEGATIVE CULT 
SET UP? YES 

 

                          1/3/2017 4:08 PM          COLOR YELLOW APPEARANCE HAZY SPEC GRAV 1.015 pH 6.0 PROTEIN 30

 GLUCOSE NEGATIVE mg/dLKETONE NEGATIVE BILIRUBIN NEGATIVE BLOOD MODERATE NITRITE
 NEGATIVE [place] SCREEN LARGE MICRO INDICATED? SEE BELOW WBC/-200 RBC/HPF 
5-10 CASTS/LPF NEGATIVE /LPFCRYSTALS NEGATIVE MUCOUS THRDS NEGATIVE BACTERIA 
NEGATIVE EPITH CELLS 1+ SQUAMOUS /HPFTRICHOMONAS NEGATIVE YEAST NEGATIVE CULT 
SET UP? YES 

 

                          2017 4:24 PM         CREATININE 1.50 mg/dLAGE 66 GFR NonAA 35 GFR AA 42 eGFR 35 eGFR

 AA* 42 VITAMIN B12 1324.0 pg/mL

 

                          2017 11:30 AM         GLUCOSE 93.0 mg/dLSODIUM 143.0 mmol/LPOTASSIUM 3.10 mmol/LCHLORIDE

 101.0 mmol/LCO2 29.0 mmol/LBUN 26.0 mg/dLCREATININE 1.50 mg/dLSGOT/AST 23.0 
IU/LSGPT/ALT 13.0 IU/LALK PHOS 66.0 IU/LTOTAL PROTEIN 7.70 g/dLALBUMIN 4.30 
g/dLTOTAL BILI 0.40 mg/dLCALCIUM 10.30 mg/dLAGE 66 GFR NonAA 35 GFR AA 42 eGFR 
35 eGFR AA* 42 TRIGLYCERIDES 147.0 mg/dLCHOLESTEROL 184.0 mg/dLHDL 44.0 mg/dLTOT
 CHOL/HDL 4.2 .0 mg/dLWBC 5.4 RBC 3.98 HGB 12.90 g/dLHCT 40.20 %.0
 fLMCH 32.40 pgMCHC 32.10 g/dLRDW SD 50 RDW CV 13.50 %MPV 9.30 fLPLT 210 NRBC# 
0.00 NRBC% 0.0 %NEUT 54.20 %%LYMP 30.70 %%MONO 9.10 %%EOS 5.20 %%BASO 0.40 
%#NEUT 2.94 #LYMP 1.66 #MONO 0.49 #EOS 0.28 #BASO 0.02 MANUAL DIFF NOT IND FREE 
T4 1.09 COLOR YELLOW APPEARANCE CLEAR SPEC GRAV <=1.005 pH 5.5 PROTEIN NEGATIVE 
GLUCOSE NEGATIVE mg/dLKETONE NEGATIVE BILIRUBIN NEGATIVE BLOOD NEGATIVE NITRITE 
NEGATIVE [place] SCREEN LARGE MICRO INDICATED? SEE BELOW WBC/HPF 10-20 RBC/HPF 0-5 
CASTS/LPF NEGATIVE /LPFCRYSTALS NEGATIVE MUCOUS THRDS NEGATIVE BACTERIA FEW 
EPITH CELLS 1+ SQUAMOUS /HPFTRICHOMONAS NEGATIVE YEAST NEGATIVE CULT SET UP? YES
 TSH 1.820 uIU/mL

 

                          2017 2:45 PM         COLOR YELLOW APPEARANCE CLEAR SPEC GRAV <=1.005 pH 6.0 PROTEIN

 NEGATIVE GLUCOSE NEGATIVE mg/dLKETONE NEGATIVE BILIRUBIN NEGATIVE BLOOD TRACE-
INTACT NITRITE NEGATIVE [place] SCREEN SMALL MICRO INDICATED? SEE BELOW WBC/HPF 0-5
 RBC/HPF NEGATIVE CASTS/LPF NEGATIVE /LPFCRYSTALS NEGATIVE MUCOUS THRDS NEGATIVE
 BACTERIA FEW EPITH CELLS FEW SQUAMOUS /HPFTRICHOMONAS NEGATIVE YEAST NEGATIVE 
CULT SET UP? YES 

 

                          2017 11:22 AM        COLOR YELLOW APPEARANCE CLEAR SPEC GRAV <=1.005 pH 6.5 PROTEIN

 NEGATIVE GLUCOSE NEGATIVE mg/dLKETONE NEGATIVE BILIRUBIN NEGATIVE BLOOD 
NEGATIVE NITRITE NEGATIVE LEUK SCREEN NEGATIVE MICRO INDICATED? NOT INDICATED 

 

                          2017 2:18 PM         Clarity Ur cloudy Color Ur dark yellow Glucose Ur-sCnc negative

 Bilirub Ur Ql Strip negative Ketones Ur Ql Strip negative Sp Gr Ur Qn 1.010 Hgb
 Ur Ql Strip trace-intact pH Ur-LsCnc 6.5 Prot Ur Ql Strip trace Urobilinogen 
Ur-mCnc 0.2 Nitrite Ur Ql Strip negative WBC Est Ur Ql Strip large 







History Of Immunizations







       Name    Date Admin    Mfg Name    Mfg Code    Trade Name    Lot#    Route    Inj    Vis Given    Vis

 Pub                                    CVX

 

        Influenza    2008    Not Entered    NE      Not Entered            Not Entered    Not Entered

                    1            999

 

        X       12/19/2008    [employer]    MSD     Pneumovax 23            Intramuscular    Not Entered

                    1            999

 

           Influenza    10/15/2010    [employer] Arely.    NOV        Fluvirin > 12 Years    

[id number]     Intramuscular    Left Deltoid    10/15/2010    2009    999

 

          X         3/23/2015    [facility]    [facility]       Prevnar 13    [id number]    Intramuscular

                Right Gluteous Medius    3/23/2015       2013       109







History of Past Illness







                    Name                Date of Onset       Comments

 

                    Peritoneal Neoplasm, Malignant                         

 

                    Hyperlipidemia                           

 

                    Bipolar disorder, unspecified                         

 

                    Artificial opening status; colostomy                         

 

                    B12 deficiency                           

 

                    Anemia, Pernicious                         

 

                    Arthritis unspecified                         

 

                    cervical cancer                          

 

                    Artificial opening status; colostomy    2010  1:10PM     

 

                    Bipolar disorder, unspecified    2010  1:10PM     

 

                    Hyperlipidemia      2010  1:10PM     

 

                    Anemia, Pernicious    2010  1:10PM     

 

                    Postoperative Follow-Up    2010  1:55PM     

 

                    Postoperative Follow-Up    Mar  8 2010 10:57AM     

 

                    Artificial opening status; colostomy    Mar  8 2010  1:19PM     

 

                    Peritoneal Neoplasm, Malignant    Mar  8 2010  1:19PM     

 

                    Artificial opening status; colostomy    2010  1:40PM     

 

                    Hyperlipidemia      2010  1:40PM     

 

                    Anemia, Pernicious    2010  1:40PM     

 

                    Peritoneal Neoplasm, Malignant    2010  1:40PM     

 

                    Arthritis unspecified    2010  1:40PM     

 

                    Anemia of Chronic Illness    2010  1:40PM     

 

                    Tinea corporis      2010  3:17PM     

 

                    Bipolar disorder, unspecified    2010  1:33PM     

 

                    Hyperlipidemia      2010  1:33PM     

 

                    Anemia, Pernicious    2010  1:33PM     

 

                    Peritoneal Neoplasm, Malignant    2010  1:33PM     

 

                    B12 deficiency      2010  1:33PM     

 

                    Ethmoidal Sinusitis, Acute    Sep 21 2010  3:53PM     

 

                    Wheezing            Sep 21 2010  3:53PM     

 

                    Flu                 Oct 15 2010  1:40PM     

 

                    Bipolar disorder, unspecified    Oct 15 2010  1:42PM     

 

                    Hyperlipidemia      Oct 15 2010  1:42PM     

 

                    Anemia, Pernicious    Oct 15 2010  1:42PM     

 

                    Peritoneal Neoplasm, Malignant    Oct 15 2010  1:42PM     

 

                    Bipolar disorder, unspecified    2011 12:01PM     

 

                    Hyperlipidemia      2011 12:01PM     

 

                    Anemia, Pernicious    2011 12:01PM     

 

                    Peritoneal Neoplasm, Malignant    2011 12:01PM     

 

                    Bipolar disorder, unspecified    Apr 15 2011 10:55AM     

 

                    Major Depression    2011 10:11AM     

 

                    Bipolar Disorder    2011 10:11AM     

 

                    Cancer              May 10 2011  4:16PM     

 

                    Major Depression    May 10 2011  3:16PM     

 

                    Bipolar Disorder    May 10 2011  3:16PM     

 

                    Hypercalcemia       May 23 2011  2:47PM     

 

                    Bipolar disorder, unspecified    May 23 2011  2:47PM     

 

                    Colon Cancer, Personal History    May 23 2011  2:47PM     

 

                    Bipolar Disorder    May 31 2011  4:39PM     

 

                    Depressive Disorder    2011 10:01AM     

 

                    Vitamin B12 deficiency    2011 10:01AM     

 

                    Vitamin D Deficiency    2011  5:07PM     

 

                    Anemia, Vitamin B12 Deficiency    2011  5:07PM     

 

                    B12 deficiency      2011  3:56PM     

 

                    Routine gynecological examination    Aug  4 2011  9:08AM     

 

                    Screening Examination for Breast Cancer    Aug  4 2011  9:08AM     

 

                    Tinea Corporis      Aug  4 2011  9:08AM     

 

                    Depressive Disorder    Sep 23 2011  8:47AM     

 

                    Contact Dermatitis    Sep 23 2011  8:47AM     

 

                    Anemia, Pernicious    Sep 23 2011  8:47AM     

 

                    B12 deficiency      Sep 23 2011  8:47AM     

 

                    B12 deficiency      Sep 27 2011  2:58PM     

 

                    B12 deficiency      Oct 20 2011  2:34PM     

 

                    Flu                 Dec  9 2011  3:16PM     

 

                    B12 deficiency      Dec  9 2011  3:17PM     

 

                    B12 deficiency      2012  4:52PM     

 

                    B12 deficiency      Feb 2012 11:10AM     

 

                    B12 deficiency      2012  3:37PM     

 

                    B12 deficiency      May  3 2012  4:10PM     

 

                    B12 deficiency      2012  2:54PM     

 

                    B12 deficiency      2012 11:23AM     

 

                    B12 deficiency      Aug  9 2012  2:08PM     

 

                    B12 deficiency      Sep  6 2012  4:36PM     

 

                    B12 deficiency      Oct 16 2012 10:23AM     

 

                    Flu                 Feb  4   3:11PM     

 

                    Bipolar disorder, unspecified    Feb  4   2:48PM     

 

                    Anemia, Pernicious    Feb  4   2:48PM     

 

                    B12 deficiency      Feb  4   2:48PM     

 

                    Extrapyramidal abnormal movement disorder    Feb  4   2:48PM     

 

                    B12 deficiency      Apr  3 2013 12:03PM     

 

                    Bipolar disorder, unspecified    May  7 2013  1:31PM     

 

                    Anemia, Pernicious    May  7 2013  1:31PM     

 

                    B12 deficiency      May  7 2013  1:31PM     

 

                    Extrapyramidal abnormal movement disorder    May  7 2013  1:31PM     

 

                    B12 deficiency      2013  3:42PM     

 

                    B12 deficiency      2013  1:31PM     

 

                    Hyperlipidemia      Aug  7 2013 10:37AM     

 

                    Vitamin D Deficiency    Aug  7 2013 10:37AM     

 

                    Bipolar disorder, unspecified    Aug  7 2013 10:37AM     

 

                    Anemia, Pernicious    Aug  7 2013 10:37AM     

 

                    B12 deficiency      Aug  7 2013 10:37AM     

 

                    B12 deficiency      Sep 25 2013 11:15AM     

 

                    B12 deficiency      Dec 11 2013  3:16PM     

 

                    B12 deficiency      Mar  6 2014  1:48PM     

 

                    B12 deficiency      May 21 2014  3:17PM     

 

                    Screening Examination for Breast Cancer    2014  3:23PM     

 

                    Periumbilical abdominal pain    2014  3:23PM     

 

                    B12 deficiency      Jul 10 2014  2:52PM     

 

                    Anemia, Vitamin B12 Deficiency    Aug 13 2014  4:50PM     

 

                    Bipolar disorder    Oct 16 2014 11:13AM     

 

                    Hyperlipidemia      Oct 16 2014 11:13AM     

 

                    Anemia, Pernicious    Oct 16 2014 11:13AM     

 

                    Peritoneal Neoplasm, Malignant    Oct 16 2014 11:13AM     

 

                    Screening breast examination    Oct 16 2014 11:13AM     

 

                    Weight loss         Oct 16 2014 11:13AM     

 

                    Anemia, Pernicious    Mar 23 2015  2:57PM     

 

                    B12 deficiency      Mar 23 2015  2:57PM     

 

                    Need for Prevnar vaccine    Mar 23 2015  2:57PM     

 

                    Bipolar disorder    Mar 23 2015  2:57PM     

 

                    Hyperlipidemia      Mar 23 2015  2:57PM     

 

                    Anemia, Pernicious    Mar 23 2015  2:57PM     

 

                    Peritoneal Neoplasm, Malignant    Mar 23 2015  2:57PM     

 

                    B12 deficiency      May  4 2015  4:48PM     

 

                    Hyperlipidemia      May 13 2015  9:56AM     

 

                    Anemia              May 13 2015  9:56AM     

 

                    Bipolar disorder    May 13 2015  9:56AM     

 

                    Bipolar disorder    May 14 2015  3:27PM     

 

                    Hyperlipidemia      May 14 2015  3:27PM     

 

                    Anemia, Pernicious    May 14 2015  3:27PM     

 

                    Peritoneal Neoplasm, Malignant    May 14 2015  3:27PM     

 

                    B12 deficiency      2015  2:20PM     

 

                    B12 deficiency      2015 11:34AM     

 

                    B12 deficiency      Aug 18 2015  9:06AM     

 

                    Tinea Corporis      Sep 18 2015  8:54AM     

 

                    B12 deficiency      Sep 18 2015  8:54AM     

 

                    B12 deficiency      2015 10:28AM     

 

                    Herpes zoster without complication    Dec  3 2015  9:52AM     

 

                    B12 deficiency      Dec 23 2015 11:21AM     

 

                    B12 deficiency      2016  4:51PM     

 

                    Vitamin B 12 deficiency    Mar 14 2016  5:35PM     

 

                    B12 deficiency      Mar 15 2016 12:14PM     

 

                    B12 deficiency      May  5 2016 11:30AM     

 

                    Edema               May  5 2016 11:30AM     

 

                    Dermatitis          May  5 2016 11:30AM     

 

                    Edema               May 17 2016  8:38AM     

 

                    Shortness of breath    May 17 2016  8:38AM     

 

                    Bilateral edema of lower extremity    2016  2:06PM     

 

                    B12 deficiency      2016  2:06PM     

 

                    B12 deficiency      2016 11:50AM     

 

                    B12 deficiency      2016 11:20AM     

 

                    Diarrhea            Aug  2 2016  3:13PM     

 

                    B12 deficiency      Aug 24 2016 11:10AM     

 

                    Encounter for screening mammogram for breast cancer    Aug 24 2016 11:44AM     

 

                    B12 deficiency      Sep 28 2016  2:35PM     

 

                    B12 deficiency      Dec 15 2016  2:02PM     

 

                    Dysuria             Dec 29 2016 12:14PM     

 

                    Hematuria           Fidencio  3 2017  1:33PM     

 

                    Constipation by delayed colonic transit    2017  1:52PM     

 

                    Ileus               2017  1:52PM     

 

                    UTI (urinary tract infection)    Fidencio 15 2017  3:39PM     

 

                    Acute cystitis with hematuria    2017 11:07AM     

 

                    B12 deficiency      2017 11:07AM     

 

                    B12 deficiency      2017 11:40AM     

 

                    B12 deficiency      2017  4:07PM     

 

                    Slurred speech      2017  3:07PM     

 

                    Vitamin B12 deficiency    2017  3:07PM     

 

                    Dysphagia, unspecified type    2017  3:07PM     

 

                    Hyperlipidemia      2017  3:07PM     

 

                    Dysuria             2017 12:01PM     

 

                    B12 deficiency      2017  2:08PM     

 

                    Dysuria             2017 10:58AM     

 

                    Hematuria           May 22 2017  1:36PM     

 

                    Depressive Disorder    May 22 2017  1:36PM     

 

                    Constipation        May 22 2017  1:36PM     

 

                    Fatigue             May 22 2017  1:36PM     

 

                    Urinary tract infection    May 30 2017  9:36AM     







Payers







           Insurance Name    Company Name    Plan Name    Plan Number    Policy Number    Policy Group

 Number                                 Start Date

 

                    Medicare RHC Medicare RHC              [id number]              N/A

 

                          [employer]    [employer]                 [id number]

                                                    

 

                    Medicare Part A    Medicare - Lab/Xray              [id number]              2006

 

                    Medicare Part B    Medicare [place]              [id number]              2006

 

                          [employer]    [employer]                 50 percent

                                                    2009

 

                    [facility]              [id number]              N/A

 

                    Medicare Part A    Medicare Part A              [id number]              N/A

 

                    Medicare Part A    Medicare Part A              [id number]              2006









History of Encounters







                    Visit Date          Visit Type          Provider

 

                    2017           Office visit        Bhupinder Aspen DO

 

                    2017           Nurse visit         Bhupinder Aspen DO

 

                    2017           Office visit         

 

                    2017           Office visit        Bhupinder Aspen DO

 

                    2017           Nurse visit         Bhupinder Aspen DO

 

                    2017           Office visit        Radha Ontiveros APRN

 

                    1/15/2017           Office visit        Aj Tapia NP

 

                    2017            Office visit        Devin Masterson MD

 

                    2016          [facility]            Devin Masterson MD

 

                    12/15/2016          Nurse visit         Bhupinder Aspen DO

 

                    2016           Nurse visit         Bret Forte APRN

 

                    2016           Nurse visit         Bhupinder Aspen DO

 

                    2016            Office visit        Bhupinder Aspen DO

 

                    2016           Nurse visit         Bhupinder Aspen DO

 

                    2016           Office visit        Bret Forte APRN

 

                    2016            Office visit        Bhupinder Aspen DO

 

                    3/15/2016           Nurse visit         Bhupinder Aspen DO

 

                    2016            Nurse visit         Bhupinder Aspen DO

 

                    2015          Nurse visit         Bhupinder Aspen DO

 

                    12/3/2015           Office visit        Bhupinder Aspen DO

 

                    2015          Nurse visit         Bhupinder Aspen DO

 

                    2015           Office visit        Bhupinder Aspen DO

 

                    2015           Nurse visit         Bhupinder Aspen DO

 

                    2015            Nurse visit         Bhupinder Aspen DO

 

                    2015            Nurse visit         Bhupinder Aspen DO

 

                    2015           Office visit        Bhupinder Aspen DO

 

                    2015            Nurse visit         Bhupinder Aspen DO

 

                    3/23/2015           Office visit        Bhupinder Aspen DO

 

                    10/16/2014          Office visit        Bhupinder Aspen DO

 

                    2014           Nurse visit         Radha Ontiveros APRN

 

                    7/10/2014           Nurse visit         Bhupinder Aspen DO

 

                    2014           Office visit        Bhupinder Aspen DO

 

                    2014           Nurse visit         Bhupinder Aspen DO

 

                    3/6/2014            Nurse visit         Bhupinder Aspen DO

 

                    2014            [facility]            EARNEST Lopez MD

 

                    2013          Nurse visit         Bhupinder Aspen DO

 

                    2013           Nurse visit         Bhupinder Aspen DO

 

                    2013            Office visit        Bhupinder Aspen DO

 

                    2013            Nurse visit         Bhupinder Aspen DO

 

                    2013            Nurse visit         Bhupinder Aspen DO

 

                    2013            Office visit        Bhupinder Aspen DO

 

                    4/3/2013            Nurse visit         Bhupinder Aspen DO

 

                    2013            Office visit        Bhupinder Aspen DO

 

                    10/16/2012          Nurse visit         Bhupinder Aspen DO

 

                    2012            Nurse visit         Bhupinder Aspen DO

 

                    2012            Voided              Bhupinder Aspen DO

 

                    2012            Nurse visit         Bhupinder Aspen DO

 

                    2012            Nurse visit         Bhupinder Aspen DO

 

                    2012           Nurse visit         Bhupinder Aspen DO

 

                    5/3/2012            Nurse visit         Bhupinder Aspen DO

 

                    2012           Nurse visit         Bhupinder Aspen DO

 

                    2012           Nurse visit         Bhupinder Aspen DO

 

                    2012           Nurse visit         Bhupinder Aspen DO

 

                    2011           Nurse visit         Bhupinder Aspen DO

 

                    10/20/2011          Nurse visit         Bhupinder Aspen DO

 

                    2011           Office visit        Bhupinder Aspen DO

 

                    2011           Nurse visit         Radha Ontiveros APRN

 

                    2011            Office visit        Bhupinder Aspen DO

 

                    2011           Nurse visit         Bhupinder Aspen DO

 

                    2011            Office visit        Bhupinder Aspen DO

 

                    2011           Office visit        Bhupinder Aspen DO

 

                    5/10/2011           Office visit        Bhupinder Aspen DO

 

                    2011           Office visit        Bhupinder Aspen DO

 

                    4/15/2011           Office visit        Devin Angel DO

 

                    2011           Office visit        Devin Angel DO

 

                    10/15/2010          Office visit        Devin Angel DO

 

                    2010           Office visit        Devin Angel DO

 

                    2010            Office visit        Devin Angel DO

 

                    2010           Office visit        Devin Angel DO

 

                    2010            Office visit        Devin Angel DO

 

                    3/8/2010            Office visit        Devin Masterson MD

 

                    2010            Surgery             Devin Masterson MD

 

                    2010            Office visit        Devin Angel DO

 

                    2010           Surgery             Devin Masterson MD

 

                    2010           Hospital            Devin Masterson MD

 

                    2010           [facility]            Devin Masterson MD

 

                    10/22/2009          Office visit        Devin Angel DO

## 2019-06-26 NOTE — XMS REPORT
MU2 Ambulatory Summary

                             Created on: 2016



Pauline Gan

External Reference #: [id number]

: 1950

Sex: Female



Demographics







                          Address                   [address]

[place]  [id number]

 

                          Home Phone                [phone]

 

                          Preferred Language        English

 

                          Marital Status            Legally 

 

                          Spiritism Affiliation     Unknown

 

                          Race                      White

 

                          Ethnic Group              Not  or 





Author







                          Author                    Bhupinder Louise

 

                          [facility]

 

                          Address                   [address]

[place]  [id number]



 

                          Phone                     [phone]







Care Team Providers







                    Care Team Member Name    Role                Phone

 

                    Bhupinder Louise    PCP                 Unavailable







Allergies and Adverse Reactions







                    Name                Reaction            Notes

 

                    NO KNOWN DRUG ALLERGIES                         







Plan of Treatment







             Planned Activity    Comments     Planned Date    Planned Time    Plan/Goal

 

             COMPLETE CBC W/AUTO DIFF WBC                 2013     12:00 AM      

 

             ASSAY OF LITHIUM                 2013     12:00 AM      

 

             METABOLIC PANEL TOTAL CA                 2013     12:00 AM      

 

             VITAMIN B-12                 2013     12:00 AM      

 

             ASSAY OF FOLIC ACID SERUM                 2013     12:00 AM      

 

             THER/PROPH/DIAG INJ SC/IM                 2013    12:00 AM      







Medications







                                        Active 

 

             Name         Start Date    Estimated Completion Date    SIG          Comments

 

                Latuda 20 mg oral tablet                                    take 1 tablet (20 mg) by oral route once daily with

 food (at least 350 calories)            

 

             pravastatin 40 mg oral tablet    3/30/2015                 TAKE 1 TABLET BY MOUTH DAILY     

 

                Namenda XR 28 mg oral capsule,sprinkle,ER 24hr    2015                       take 1 capsule (28

 mg) by oral route once daily            

 

                Namenda XR 28 mg oral capsule,sprinkle,ER 24hr    2016                       take 1 capsule (28

 mg) by oral route once daily            

 

                triamcinolone acetonide 0.1 % topical cream    2016                        apply a thin layer to 

the affected area(s) by topical route 2 times per day     

 

                furosemide 40 mg oral tablet    2016      take 1 tablet (40 mg) by oral

 route once daily                        

 

                potassium chloride 10 mEq oral tablet extended release    2016                       take 1 tablet

 (10 meq) by oral route once daily       

 

             pravastatin 40 mg oral tablet    2016                 TAKE 1 TABLET BY MOUTH DAILY     

 

                Cipro 500 mg oral tablet    2016       take 1 tablet (500 mg) by oral route

 2 times per day for 5 days              









                                         

 

             Name         Start Date    Expiration Date    SIG          Comments

 

             Reglan 10mg    3/29/2010    2010    one ac and hs     

 

                Keflex 500 mg oral capsule    2010       10/1/2010       take 1 capsule (500 mg) by oral

 route every 6 hours for 10 days         

 

                Bactrim -160 mg oral tablet    2011       take 1 tablet by oral route

 every 12 hours for 7 days               

 

                triamcinolone acetonide 0.1 % topical cream    2011      apply a thin

 layer to the affected area(s) by topical route 2 times per day     

 

                sertraline 100 mg oral tablet    4/10/2012       5/10/2012       take 1.5 tablets by oral route

 daily for 30 days                       

 

                ergocalciferol (vitamin D2) 50,000 unit oral capsule    4/15/2013       2013       TAKE

 ONE CAPSULE BY MOUTH ONCE A WEEK        

 

                CYANOCOBALAM 1000MCGINJ 1000 milliliter    2013       INJECT 1ML INTRAMUSCULAR

 ONCE A MONTH                            

 

                pravastatin 40 mg oral tablet    3/25/2014       3/20/2015       TAKE ONE TABLET BY MOUTH EVERY

 DAY                                     

 

                          Zostavax (PF) 19,400 unit/0.65 mL subcutaneous suspension for reconstitution    3/23/2015

                    3/24/2015           inject 0.65 milliliter by subcutaneous route once     

 

                lithium carbonate 300 mg oral capsule    2015       take 1 capsule (300

 mg) by oral route 2 for 30 days         

 

                famciclovir 500 mg oral tablet    12/3/2015       12/10/2015      take 1 tablet (500 mg) by

 oral route every 8 hours for 7 days     









                                        Discontinued 

 

             Name         Start Date    Discontinued Date    SIG          Comments

 

                Tylenol 325 mg oral tablet                    2013        take 1 - 2 tablets (325 -650 mg) by oral

 route every 4-6 hours as needed         

 

                Calcium 600 + D(3) 600 mg(1,500mg) -400 unit oral tablet                    2011       take 1 tablet

 by oral route 2 times a day            no longer taking

 

                Vitamin B-12 1,000 mcg oral tablet extended release    2010       take 1

 tablet by oral route daily             no longer taking

 

                Antifungal (clotrimazole) 1 % topical cream    2010       apply to the 

affected and surrounding areas of skin by topical route 2 times per day morning 
and evening                              

 

                sertraline 100 mg oral tablet    5/10/2011       2011       take 2 tablets (200 mg) by 

oral route once daily                   discontinued by Dr. Serrano

 

                mirtazapine 15 mg oral tablet                    2011        take 1 tablet (15 mg) by oral route 

once daily before bedtime               Dr. Serrano

 

                mirtazapine 15 mg oral tablet                    2011        take 1 tablet (15 mg) by oral route 

once daily before bedtime               dc'd by Dr. Serrano

 

                Pristiq 50 mg oral tablet extended release 24 hr                    2013        take 1 tablet (50

 mg) by oral route once daily           Dr. Serrano

 

                Pristiq 50 mg oral tablet extended release 24 hr                    2013        take 1 tablet (50

 mg) by oral route once daily           dose updated

 

                Vitamin B-12 1,000 mcg/mL injection solution    2011        inject 1 milliliter

 (1,000 mcg) by intramuscular route once a month    on list already

 

                    syringe with needle 1 mL 25 gauge x 1" miscellaneous syringe    2011

                          use for injection once a month     

 

                clotrimazole 1 % topical cream    2011        apply to the affected and surrounding

 areas of skin by topical route 2 times per day in the morning and evening     

 

                Vitamin D2 50,000 unit oral capsule    2011        take 1 capsule (50,000

 unit) by oral route once weekly        generic on list

 

                Pravachol 40 mg oral tablet    2012        take 1 tablet (40 mg) by oral 

route once daily for 90 days            generic on list

 

                lithium carbonate 300 mg oral capsule    2012        take 1 capsule by oral

 route daily                            dose updated

 

                Pristiq 100 mg oral tablet extended release 24 hr                    4/10/2012       take 1 and 1/2 

tablet (150 mg) by oral route once daily    Mental Health provider

 

                Pristiq 100 mg oral tablet extended release 24 hr                    4/10/2012       take 1 and 1/2 

tablet (150 mg) by oral route once daily    Discontinued by Dr Efrain [facility] at [facility]

 

                hydroxyzine HCl 50 mg oral tablet    10/16/2014      2015       take 1 tablet (50 mg) 

by oral route at bedtime                 

 

                fluconazole 100 mg oral tablet    2015       12/3/2015       take 1 tablet (100 mg) by 

oral route once a week                   

 

                ketoconazole 2 % topical cream    2015       12/3/2015       apply to the affected area(s)

 by topical route 2 times per day        

 

                prednisone 10 mg oral tablet    12/3/2015       2016        take 2 tablets (20 mg) by oral

 route once daily for 4 days 1 tablet daily for 4 days 0.5 tablet daily for 4 
days                                     







Problem List







                    Description         Status              Onset

 

                    Artificial opening status; colostomy    Active               

 

                    Bipolar disorder, unspecified    Active               

 

                    Hyperlipidemia      Active               

 

                    Peritoneal Neoplasm, Malignant    Active               

 

                    Anemia, Pernicious    Active               

 

                    Arthritis unspecified    Active               

 

                    B12 deficiency      Active               







Vital Signs







      Date    Time    BP-Sys(mm[Hg]    BP-Lynn(mm[Hg])    HR(bpm)    RR(rpm)    Temp    WT    HT    HC    BMI

                    BSA                 BMI Percentile      O2 Sat(%)

 

        2016    11:27:00 AM    148 mmHg    78 mmHg    90 bpm    20 rpm    98.2 F    153 lbs    69 in

                          22.59 kg/m2    1.84 m2                   96 %

 

        12/3/2015    9:50:00 AM    132 mmHg    70 mmHg    62 bpm    16 rpm    97.9 F    145 lbs    69 in

                          21.4125 kg/m    1.7894 m                 100 %

 

        2015    8:52:00 AM    132 mmHg    68 mmHg    52 bpm    20 rpm    97.8 F    141 lbs    69 in

                          20.82 kg/m2    1.76 m2                   100 %

 

        2015    3:25:00 PM    120 mmHg    62 mmHg    72 bpm    16 rpm    98.1 F    136 lbs    69 in

                          20.0835 kg/m    1.733 m                 98 %

 

       3/23/2015    2:55:00 PM    130 mmHg    76 mmHg    68 bpm    18 rpm    97 F    140 lbs    69 in    

                20.67 kg/m2     1.76 m2                         98 %

 

        10/16/2014    11:11:00 AM    120 mmHg    66 mmHg    77 bpm    20 rpm    98 F    130 lbs    69 in

                          19.1974 kg/m    1.6943 m                 100 %

 

        2014    3:21:00 PM    130 mmHg    66 mmHg    63 bpm    18 rpm    97.2 F    160 lbs    69 in

                          23.63 kg/m2    1.88 m2                   99 %

 

        2013    10:35:00 AM    132 mmHg    70 mmHg    66 bpm    20 rpm    98.1 F    157 lbs    69 in

                          23.1846 kg/m    1.862 m                  

 

        2013    1:29:00 PM    132 mmHg    70 mmHg    76 bpm    18 rpm    98.2 F    166 lbs    69 in 

                          24.51 kg/m2    1.91 m2                    

 

       2013    2:46:00 PM    128 mmHg    70 mmHg    76 bpm    16 rpm    98 F    160 lbs    69 in     

                23.6276 kg/m    1.8797 m                       

 

        2011    8:49:00 AM    128 mmHg    78 mmHg    70 bpm    18 rpm    97.9 F    164 lbs    69 in

                          24.22 kg/m2    1.90 m2                    

 

     2011    1:31:00 PM    132 mmHg    68 mmHg    84 bpm         97 F    167 lbs                        

                                         

 

        2011    9:09:00 AM    128 mmHg    70 mmHg    72 bpm    18 rpm    98.2 F    163 lbs    64 in 

                          27.98 kg/m2    1.83 m2                    

 

       2011    10:01:00 AM    132 mmHg    70 mmHg    72 bpm    18 rpm    98.2 F    154 lbs             

                                                                 

 

       2011    2:47:00 PM    128 mmHg    70 mmHg    72 bpm    18 rpm    97.8 F    156 lbs             

                                                                 

 

       5/10/2011    3:16:00 PM    144 mmHg    80 mmHg    72 bpm    18 rpm    98.2 F    158 lbs             

                                                                 

 

        2011    10:11:00 AM    132 mmHg    70 mmHg    70 bpm    18 rpm    98.2 F    168 lbs    69 in

                          24.81 kg/m2    1.93 m2                    

 

        4/15/2011    10:52:00 AM    110 mmHg    60 mmHg    75 bpm    16 rpm    97.5 F    172.375 lbs    

69 in                     25.4551 kg/m    1.951 m                 100 %

 

        2011    11:43:00 AM    120 mmHg    82 mmHg    75 bpm    16 rpm    97.2 F    178.5 lbs    69

 in                       26.36 kg/m2    1.99 m2                   100 %

 

        10/15/2010    1:32:00 PM    120 mmHg    70 mmHg    80 bpm    18 rpm    96.6 F    177 lbs    69 in

                          26.1381 kg/m    1.977 m                 100 %

 

        2010    3:50:00 PM    168 mmHg    100 mmHg    82 bpm    18 rpm    97.8 F    177.5 lbs    69

 in                       26.21 kg/m2    1.98 m2                   97 %

 

        2010    1:21:00 PM    140 mmHg    80 mmHg    59 bpm    16 rpm    97.6 F    173.25 lbs    69 

in                        25.5843 kg/m    1.956 m                 100 %

 

        2010    3:02:00 PM    140 mmHg    80 mmHg    61 bpm    16 rpm    97.6 F    173.125 lbs    69

 in                       25.57 kg/m2    1.96 m2                   99 %

 

        2010    1:23:00 PM    130 mmHg    80 mmHg    66 bpm    16 rpm    96.8 F    173 lbs    69 in 

                          25.5474 kg/m    1.9546 m                 100 %

 

        2010    12:58:00 PM    130 mmHg    88 mmHg    75 bpm    16 rpm    98.4 F    172.25 lbs    69

 in                       25.44 kg/m2    1.95 m2                   100 %







Social History







                    Name                Description         Comments

 

                    denies alcohol use                         

 

                    denies smoking                           

 

                    Denies illicit substance abuse                         

 

                    retired                                 direct care

 

                    Single                                   

 

                    Exercises regularly                         

 

                    Attended some college                         

 

                    Tobacco             Never smoker         







History of Procedures







                    Date Ordered        Description         Order Status

 

                    2010 12:00 AM    COMPREHEN METABOLIC PANEL    Reviewed

 

                    2010 12:00 AM    COMPLETE CBC W/AUTO DIFF WBC    Reviewed

 

                    2010 12:00 AM    LIPID PANEL         Reviewed

 

                          2015 12:00 AM        B12 Injection, Up to 1000 Mcg NDC#2488-5323-80 [facility] 

                                        Reviewed

 

                    2011 12:00 AM    MAMMOGRAM SCREENING    Reviewed

 

                    2011 12:00 AM    CYTOPATH C/V THIN LAYER    Reviewed

 

                    2011 12:00 AM    B12 Injection 1 cc NDC#[address]-08    Reviewed

 

                    2015 12:00 AM    THER/PROPH/DIAG INJ SC/IM    Reviewed

 

                    2015 12:00 AM    B12 Injection, Up to 1000 Mcg NDC#2749-9441-97    Reviewed

 

                    2011 12:00 AM    THER/PROPH/DIAG INJ SC/IM    Reviewed

 

                    2011 12:00 AM    B12 Injection([place]) Ndc#5415-5052-01-    Reviewed

 

                    2015 12:00 AM    THER/PROPH/DIAG INJ SC/IM    Reviewed

 

                    2015 12:00 AM    B12 Injection, Up to 1000 Mcg NDC#6637-7111-61    Reviewed

 

                    10/20/2011 12:00 AM    THER/PROPH/DIAG INJ SC/IM    Reviewed

 

                    10/20/2011 12:00 AM    B12 Injection([place]) Ndc#6042-7087-96-    Reviewed

 

                    2016 12:00 AM    THER/PROPH/DIAG INJ SC/IM    Reviewed

 

                    2016 12:00 AM    B12 Injection, Up to 1000 Mcg NDC#1011-8945-95    Reviewed

 

                    3/14/2016 12:00 AM    VITAMIN B-12        Reviewed

 

                    3/15/2016 12:00 AM    THER/PROPH/DIAG INJ SC/IM    Reviewed

 

                    3/15/2016 12:00 AM    B12 Injection, Up to 1000 Mcg NDC#5444-6629-52    Reviewed

 

                    2011 12:00 AM    ***Immunization administration, Medicare flu    Reviewed

 

                    2011 12:00 AM    Fluzone ** MEDICARE Only **    Reviewed

 

                    2011 12:00 AM    THER/PROPH/DIAG INJ SC/IM    Reviewed

 

                    2011 12:00 AM    B12 Injection ([facility]) Ndc#8403-6758-62    Reviewed

 

                    2016 12:00 AM    B12 Injection, Up to 1000 Mcg NDC#6539-8596-46 [facility]    

Reviewed

 

                    2016 12:00 AM    TTE W/DOPPLER COMPLETE    Reviewed

 

                    2012 12:00 AM    THER/PROPH/DIAG INJ SC/IM    Reviewed

 

                    2012 12:00 AM    B12 Injection ([facility]) Ndc#9531-5481-68    Reviewed

 

                    2012 12:00 AM    THER/PROPH/DIAG INJ SC/IM    Reviewed

 

                    2012 12:00 AM    B12 Injection([place]) Ndc#[address]-49[address]-16-    Reviewed

 

                    5/3/2012 12:00 AM    THER/PROPH/DIAG INJ SC/IM    Reviewed

 

                    5/3/2012 12:00 AM    B12 Injection([place]) Ndc#5937-3109-02-    Reviewed

 

                    2012 12:00 AM    IMMUNOTHERAPY INJECTIONS    Reviewed

 

                    2012 12:00 AM    B12 Injection([place]) Ndc#3527-1631-35-    Reviewed

 

                    2012 12:00 AM    THER/PROPH/DIAG INJ SC/IM    Reviewed

 

                    2012 12:00 AM    B12 Injection, Up to 1000 Mcg NDC#1451-4048-50    Reviewed

 

                    2012 12:00 AM    THER/PROPH/DIAG INJ SC/IM    Reviewed

 

                    2012 12:00 AM    B12 Injection, Up to 1000 Mcg NDC#6923-7097-71    Reviewed

 

                    2012 12:00 AM    THER/PROPH/DIAG INJ SC/IM    Reviewed

 

                    2012 12:00 AM    B12 Injection, Up to 1000 Mcg NDC#7887-8190-14    Reviewed

 

                    10/16/2012 12:00 AM    THER/PROPH/DIAG INJ SC/IM    Reviewed

 

                    10/16/2012 12:00 AM    B12 Injection, Up to 1000 Mcg NDC#2925-7507-16    Reviewed

 

                    2010 12:00 AM    COMPREHEN METABOLIC PANEL    Reviewed

 

                    2010 12:00 AM    COMPLETE CBC W/AUTO DIFF WBC    Reviewed

 

                    2010 12:00 AM    LIPID PANEL         Reviewed

 

                    2013 12:00 AM    Flu Injection 3 Years And Above NDC# [address]-17  [facility]    Reviewed



 

                    2013 12:00 AM    COMPLETE CBC W/AUTO DIFF WBC    Reviewed

 

                    2013 12:00 AM    ASSAY OF LITHIUM    Reviewed

 

                    2013 12:00 AM    METABOLIC PANEL TOTAL CA    Reviewed

 

                    4/3/2013 12:00 AM    THER/PROPH/DIAG INJ SC/IM    Reviewed

 

                    4/3/2013 12:00 AM    B12 Injection, Up to 1000 Mcg NDC#1051-1412-34    Reviewed

 

                    2013 12:00 AM    THER/PROPH/DIAG INJ SC/IM    Reviewed

 

                    2013 12:00 AM    B12 Injection, Up to 1000 Mcg NDC#5239-8127-16    Reviewed

 

                    2013 12:00 AM    THER/PROPH/DIAG INJ SC/IM    Reviewed

 

                    2013 12:00 AM    B12 Injection, Up to 1000 Mcg NDC#4683-2619-93    Reviewed

 

                    2013 12:00 AM    LIPID PANEL         Reviewed

 

                    2013 12:00 AM    VITAMIN D 25 HYDROXY    Reviewed

 

                    2013 12:00 AM    THER/PROPH/DIAG INJ SC/IM    Reviewed

 

                    3/6/2014 12:00 AM    THER/PROPH/DIAG INJ SC/IM    Reviewed

 

                    2014 12:00 AM    THER/PROPH/DIAG INJ SC/IM    Reviewed

 

                    2014 12:00 AM    B12 Injection, Up to 1000 Mcg NDC#9118-4738-06    Reviewed

 

                    2010 12:00 AM    SKIN FUNGI CULTURE    Reviewed

 

                    10/9/2010 12:00 AM    COMPREHEN METABOLIC PANEL    Reviewed

 

                    10/9/2010 12:00 AM    LIPID PANEL         Reviewed

 

                    2010 12:00 AM    THER/PROPH/DIAG INJ SC/IM    Reviewed

 

                    2010 12:00 AM    B12 Injection Ndc#[address]-05 ([place])    Reviewed

 

                    2010 12:00 AM    THER/PROPH/DIAG INJ SC/IM    Reviewed

 

                    2010 12:00 AM    Kenalog 40 Mg Im-Ndc#[address]-29 ([place])    Reviewed

 

                    10/15/2010 12:00 AM    FLU VACCINE 3 YRS & > IM    Reviewed

 

                    10/15/2010 12:00 AM    Admin.Of M/C Cov.Vaccine-Flu Vacc.    Reviewed

 

                    1/15/2011 12:00 AM    COMPLETE CBC W/AUTO DIFF WBC    Reviewed

 

                    1/15/2011 12:00 AM    COMPREHEN METABOLIC PANEL    Reviewed

 

                    1/15/2011 12:00 AM    LIPID PANEL         Reviewed

 

                    2014 12:00 AM    MAMMOGRAM SCREENING    Reviewed

 

                    2014 12:00 AM    Screening mammography, bilateral    Reviewed

 

                    7/10/2014 12:00 AM    THER/PROPH/DIAG INJ SC/IM    Reviewed

 

                    7/10/2014 12:00 AM    B12 Injection, Up to 1000 Mcg NDC#9599-8025-92    Reviewed

 

                    2011 12:00 AM    COMPLETE CBC W/AUTO DIFF WBC    Reviewed

 

                    2011 12:00 AM    COMPREHEN METABOLIC PANEL    Reviewed

 

                    2011 12:00 AM    LIPID PANEL         Reviewed

 

                    2014 12:00 AM    B12 Injection, Up to 1000 Mcg NDC#2999-7838-73    Reviewed

 

                    10/19/2014 12:00 AM    MAMMOGRAM SCREENING    Reviewed

 

                    10/19/2014 12:00 AM    Screening mammography, bilateral    Reviewed

 

                    10/16/2014 12:00 AM    COMPLETE CBC W/AUTO DIFF WBC    Reviewed

 

                    10/16/2014 12:00 AM    COMPREHEN METABOLIC PANEL    Reviewed

 

                    10/16/2014 12:00 AM    IMMUNOASSAY TUMOR     Reviewed

 

                    10/16/2014 12:00 AM    LIPID PANEL         Reviewed

 

                    10/16/2014 12:00 AM    ASSAY OF LITHIUM    Reviewed

 

                    10/16/2014 12:00 AM    MAMMOGRAM SCREENING    Reviewed

 

                    2011 12:00 AM    ASSAY OF PARATHORMONE    Reviewed

 

                    2011 12:00 AM    VITAMIN D 25 HYDROXY    Reviewed

 

                    2011 12:00 AM    ASSAY OF LITHIUM    Reviewed

 

                    2011 12:00 AM    METABOLIC PANEL TOTAL CA    Reviewed

 

                    2011 12:00 AM    CT HEAD/BRAIN W/O & W/DYE    Reviewed

 

                    3/23/2015 12:00 AM    PNEUMOCOCCAL VACC 13 GLENDY IM    Reviewed

 

                    3/23/2015 12:00 AM    Vitamin B12 injection    Reviewed

 

                    2011 12:00 AM    ASSAY OF LITHIUM    Reviewed

 

                    2011 12:00 AM    B12 Injection Ndc#[address]-87  [place]    Reviewed

 

                    2015 12:00 AM    THER/PROPH/DIAG INJ SC/IM    Reviewed

 

                    2015 12:00 AM    B12 Injection, Up to 1000 Mcg NDC#4314-1370-62    Reviewed

 

                    2015 12:00 AM    COMPLETE CBC W/AUTO DIFF WBC    Reviewed

 

                    2015 12:00 AM    COMPREHEN METABOLIC PANEL    Reviewed

 

                    2015 12:00 AM    LIPID PANEL         Reviewed

 

                    2015 12:00 AM    ASSAY OF LITHIUM    Reviewed

 

                    2011 12:00 AM    VIT D 1 25-DIHYDROXY    Reviewed

 

                    2011 12:00 AM    VITAMIN B-12        Reviewed

 

                    2015 12:00 AM    B12 Injection, Up to 1000 Mcg NDC#6124-3888-92    Reviewed

 

                    2015 12:00 AM    THER/PROPH/DIAG INJ SC/IM    Reviewed

 

                    2015 12:00 AM    B12 Injection, Up to 1000 Mcg NDC#9657-5501-20    Reviewed

 

                    2011 12:00 AM    THER/PROPH/DIAG INJ SC/IM    Reviewed

 

                    2011 12:00 AM    B12 Injection ([facility]) Ndc#1583-7121-81    Reviewed

 

                    2015 12:00 AM    THER/PROPH/DIAG INJ SC/IM    Reviewed

 

                    2015 12:00 AM    B12 Injection, Up to 1000 Mcg NDC#4586-7525-84    Reviewed







Results Summary







                          Data and Description      Results

 

                          2004 12:00 AM        Colonoscopy-Women and Men over 50 Normal 

 

                          2008 12:00 AM         Pap Smear Declined 

 

                          10/7/2009 12:00 AM        Cholest Cry Stone Ql .0 %LDLc SerPl-mCnc 123.0 mg/dLHDLc

 SerPl-mCnc 34.0 mg/dLTrigl SerPl-mCnc 190.0 mg/dLGlucose SerPl-mCnc 78.0 mg/dL

 

                          2009 12:00 AM        Mammogram -Women over 40 Normal HIV1+2 Ab Ser Ql no risk 

 

                          2010 8:47 AM         Dexa Bone Scan Refused Aspirin reccommended Contraindication 



 

                          2010 8:48 AM         Depression Done 

 

                          2010 12:00 AM         Foot Exam-Diabetic Done 

 

                          2010 12:00 AM         Cholest Cry Stone Ql .0 %LDLc SerPl-mCnc 126.0 mg/dLGlucose

 SerPl-mCnc 102.0 mg/dL

 

                          2010 8:45 AM          TRIGLYCERIDES 122.0 mg/dLCHOLESTEROL 186.0 mg/dLHDL 36.0 mg/dLLDL

 (CALC) 126.0 mg/dLGLUCOSE 102.0 mg/dLSODIUM 143.0 mmol/LPOTASSIUM 3.70 
mmol/LCHLORIDE 111.0 mmol/LCO2 23.0 mmol/LBUN 10.0 mg/dLCREATININE 0.80 
mg/dLSGOT/AST 12.0 IU/LSGPT/ALT 11.0 IU/LALK PHOS 65.0 IU/LTOTAL PROTEIN 7.20 
g/dLALBUMIN 3.90 g/dLTOTAL BILI 0.50 mg/dLCALCIUM 10.20 mg/dLeGFR >60 
mL/min/1.73 m2WBC 5.7 RBC 3.26 HGB 10.60 g/dLHCT 31.70 %MCV 97.0 fLMCH 32.50 
pgMCHC 33.40 g/dLRDW CV 13.30 %MPV 9.70 fLPLT 287 %NEUT 62.90 %%LYMP 21.80 
%%MONO 9.90 %%EOS 5.0 %%BASO 0.40 %#NEUT 3.56 #LYMP 1.23 #MONO 0.56 #EOS 0.28 
#BASO 0.02 

 

                          2010 12:00 AM        Glucose SerPl-mCnc 96.0 mg/dLCholest Cry Stone Ql .0 %LDLc

 SerPl-mCnc 146.0 mg/dL

 

                          2010 8:26 AM         TRIGLYCERIDES 106.0 mg/dLCHOLESTEROL 199.0 mg/dLHDL 32.0 mg/dLLDL

 (CALC) 146.0 mg/dLGLUCOSE 96.0 mg/dLSODIUM 143.0 mmol/LPOTASSIUM 4.0 
mmol/LCHLORIDE 113.0 mmol/LCO2 24.0 mmol/LBUN 13.0 mg/dLCREATININE 1.0 
mg/dLSGOT/AST 11.0 IU/LSGPT/ALT 6.0 IU/LALK PHOS 56.0 IU/LTOTAL PROTEIN 6.60 
g/dLALBUMIN 3.80 g/dLTOTAL BILI 0.50 mg/dLCALCIUM 9.30 mg/dLeGFR 57 

 

                          10/6/2010 12:00 AM        Cholest Cry Stone Ql .0 %LDLc SerPl-mCnc 111.0 mg/dLGlucose

 SerPl-mCnc 81.0 mg/dL

 

                          10/6/2010 2:45 PM         TRIGLYCERIDES 123.0 mg/dLCHOLESTEROL 178.0 mg/dLHDL 42.0 mg/dLLDL

 (CALC) 111.0 mg/dLGLUCOSE 81.0 mg/dLSODIUM 139.0 mmol/LPOTASSIUM 4.10 
mmol/LCHLORIDE 106.0 mmol/LCO2 24.0 mmol/LBUN 13.0 mg/dLCREATININE 0.90 
mg/dLSGOT/AST 13.0 IU/LSGPT/ALT 11.0 IU/LALK PHOS 61.0 IU/LTOTAL PROTEIN 7.10 
g/dLALBUMIN 3.90 g/dLTOTAL BILI 0.30 mg/dLCALCIUM 9.30 mg/dLeGFR >60 mL/min/1.73
 m2WBC 6.9 RBC 3.59 HGB 11.50 g/dLHCT 35.30 %MCV 98.0 fLMCH 32.0 pgMCHC 32.60 
g/dLRDW CV 12.90 %MPV 9.90 fLPLT 311 %NEUT 64.90 %%LYMP 22.50 %%MONO 7.20 %%EOS 
5.10 %%BASO 0.30 %#NEUT 4.45 #LYMP 1.54 #MONO 0.49 #EOS 0.35 #BASO 0.02 

 

                          2011 12:00 AM         Mammogram -Women over 40 Ordered 

 

                          2011 10:25 AM        TRIGLYCERIDES 111.0 mg/dLCHOLESTEROL 195.0 mg/dLHDL 43.0 mg/dLLDL

 (CALC) 130.0 mg/dLWBC 5.3 RBC 3.76 HGB 12.0 g/dLHCT 37.80 %.0 fLMCH 
31.90 pgMCHC 31.70 g/dLRDW CV 13.0 %MPV 9.70 fLPLT 259 %NEUT 69.0 %%LYMP 17.60 
%%MONO 8.30 %%EOS 4.70 %%BASO 0.40 %#NEUT 3.63 #LYMP 0.93 #MONO 0.44 #EOS 0.25 
#BASO 0.02 GLUCOSE 102.0 mg/dLSODIUM 146.0 mmol/LPOTASSIUM 4.20 mmol/LCHLORIDE 
113.0 mmol/LCO2 23.0 mmol/LBUN 15.0 mg/dLCREATININE 1.0 mg/dLSGOT/AST 12.0 
IU/LSGPT/ALT 17.0 IU/LALK PHOS 60.0 IU/LTOTAL PROTEIN 6.90 g/dLALBUMIN 4.20 
g/dLTOTAL BILI 0.40 mg/dLCALCIUM 9.70 mg/dLeGFR 57 

 

                          2011 11:49 AM        Cholest Cry Stone Ql .0 %LDLc SerPl-mCnc 130.0 mg/dLHDLc

 SerPl-mCnc 43.0 mg/dLTrigl SerPl-mCnc 111.0 mg/dLGlucose SerPl-mCnc 102.0 mg/dL

 

                          2011 11:52 AM        Pap Smear Declined 

 

                          2011 11:28 AM        Lithium 2.080 mmol/LGLUCOSE 102.0 mg/dLSODIUM 135.0 mmol/LPOTASSIUM

 3.90 mmol/LCHLORIDE 106.0 mmol/LCO2 21.0 mmol/LBUN 12.0 mg/dLCREATININE 1.30 
mg/dLCALCIUM 10.70 mg/dLeGFR 42 

 

                          2011 8:58 AM          Lithium 0.690 mmol/L

 

                          2011 2:38 PM         VITAMIN B12 3483.0 pg/mL

 

                          2013 3:35 PM          WBC 5.1 RBC 3.73 HGB 11.70 g/dLHCT 36.40 %MCV 98.0 fLMCH 31.40

 pgMCHC 32.10 g/dLRDW CV 13.10 %MPV 9.80 fLPLT 224 %NEUT 66.80 %%LYMP 19.10 
%%MONO 9.0 %%EOS 4.90 %%BASO 0.20 %#NEUT 3.42 #LYMP 0.98 #MONO 0.46 #EOS 0.25 
#BASO 0.01 GLUCOSE 88.0 mg/dLSODIUM 141.0 mmol/LPOTASSIUM 4.10 mmol/LCHLORIDE 
110.0 mmol/LCO2 22.0 mmol/LBUN 22.0 mg/dLCREATININE 1.10 mg/dLCALCIUM 9.80 
mg/dLeGFR 50 Lithium 0.760 mmol/L

 

                          2013 11:02 AM        TRIGLYCERIDES 106.0 mg/dLCHOLESTEROL 181.0 mg/dLHDL 46.0 mg/dLLDL

 (CALC) 114.0 mg/dLVITAMIN D 41.10 ng/mL

 

                          10/17/2014 10:10 AM       WBC 5.0 RBC 3.66 HGB 11.60 g/dLHCT 36.80 %.0 fLMCH 31.70

 pgMCHC 31.50 g/dLRDW CV 13.50 %MPV 10.10 fLPLT 209 %NEUT 69.20 %%LYMP 21.0 
%%MONO 6.40 %%EOS 3.20 %%BASO 0.20 %#NEUT 3.46 #LYMP 1.05 #MONO 0.32 #EOS 0.16 
#BASO 0.01 GLUCOSE 100.0 mg/dLSODIUM 148.0 mmol/LPOTASSIUM 3.90 mmol/LCHLORIDE 
114.0 mmol/LCO2 26.0 mmol/LBUN 12.0 mg/dLCREATININE 1.20 mg/dLSGOT/AST 9.0 
IU/LSGPT/ALT <6 IU/LALK PHOS 82.0 IU/LTOTAL PROTEIN 6.90 g/dLALBUMIN 4.0 
g/dLTOTAL BILI 0.40 mg/dLCALCIUM 10.50 mg/dLeGFR 45 TRIGLYCERIDES 96.0 
mg/dLCHOLESTEROL 155.0 mg/dLHDL 38.0 mg/dLLDL (CALC) 98.0 mg/dLLithium 0.850 
mmol/LCancer Antigen (CA) 125 8.30 U/mL

 

                          2015 10:25 AM        Lithium 0.790 mmol/LWBC 4.8 RBC 3.44 HGB 11.0 g/dLHCT 35.20 

%.0 fLMCH 32.0 pgMCHC 31.30 g/dLRDW CV 14.0 %MPV 9.30 fLPLT 210 %NEUT 
70.80 %%LYMP 17.20 %%MONO 8.10 %%EOS 3.50 %%BASO 0.40 %#NEUT 3.41 #LYMP 0.83 
#MONO 0.39 #EOS 0.17 #BASO 0.02 TRIGLYCERIDES 107.0 mg/dLCHOLESTEROL 174.0 
mg/dLHDL 43.0 mg/dLLDL (CALC) 110.0 mg/dLGLUCOSE 90.0 mg/dLSODIUM 145.0 
mmol/LPOTASSIUM 3.80 mmol/LCHLORIDE 115.0 mmol/LCO2 24.0 mmol/LBUN 17.0 mg
/dLCREATININE 1.30 mg/dLSGOT/AST 18.0 IU/LSGPT/ALT 17.0 IU/LALK PHOS 56.0 
IU/LTOTAL PROTEIN 6.70 g/dLALBUMIN 3.90 g/dLTOTAL BILI 0.40 mg/dLCALCIUM 9.80 
mg/dLeGFR 41 

 

                          2015 8:50 AM        WBC 5.8 RBC 3.29 HGB 10.70 g/dLHCT 34.0 %.0 fLMCH 32.50

 [facility] g/dLRDW CV 13.60 %MPV 9.60 fLPLT 223 %NEUT 69.60 %%LYMP 18.90 
%%MONO 8.50 %%EOS 2.80 %%BASO 0.20 %#NEUT 4.03 #LYMP 1.09 #MONO 0.49 #EOS 0.16 
#BASO 0.01 Lithium 0.620 mmol/LGLUCOSE 83.0 mg/dLSODIUM 139.0 mmol/LPOTASSIUM 
3.90 mmol/LCHLORIDE 109.0 mmol/LCO2 22.0 mmol/LBUN 19.0 mg/dLCREATININE 1.40 
mg/dLSGOT/AST 19.0 IU/LSGPT/ALT 21.0 IU/LALK PHOS 55.0 IU/LTOTAL PROTEIN 6.50 
g/dLALBUMIN 3.90 g/dLTOTAL BILI 0.50 mg/dLCALCIUM 9.60 mg/dLeGFR 38 
TRIGLYCERIDES 121.0 mg/dLCHOLESTEROL 192.0 mg/dLHDL 51.0 mg/dLLDL 121.0 mg/dLTSH
 1.210 uIU/mLHemoglobin A1c 5.40 %

 

                          3/15/2016 8:08 AM         VITAMIN B12 696.0 pg/mL

 

                          3/23/2016 8:26 AM         WBC 7.0 RBC 3.61 HGB 11.80 g/dLHCT 37.70 %.0 fLMCH 32.70

 pgMCHC 31.30 g/dLRDW CV 12.50 %MPV 10.0 fLPLT 207 %NEUT 73.60 %%LYMP 16.40 
%%MONO 6.60 %%EOS 3.0 %%BASO 0.30 %#NEUT 5.15 #LYMP 1.15 #MONO 0.46 #EOS 0.21 
#BASO 0.02 Lithium 0.940 mmol/LGLUCOSE 108.0 mg/dLSODIUM 143.0 mmol/LPOTASSIUM 
4.30 mmol/LCHLORIDE 110.0 mmol/LCO2 27.0 mmol/LBUN 16.0 mg/dLCREATININE 1.60 
mg/dLSGOT/AST 13.0 IU/LSGPT/ALT 7.0 IU/LALK PHOS 71.0 IU/LTOTAL PROTEIN 6.80 
g/dLALBUMIN 4.0 g/dLTOTAL BILI 0.20 mg/dLCALCIUM 10.40 mg/dLeGFR 32 
TRIGLYCERIDES 113.0 mg/dLCHOLESTEROL 169.0 mg/dLHDL 42.0 mg/dLLDL (CALC) 104.0 
mg/dLTSH 2.20 uIU/mLHemoglobin A1c 5.20 %

 

                          3/25/2016 9:17 AM         COLOR YELLOW APPEARANCE CLEAR SPEC GRAV 1.010 pH 7.0 PROTEIN 

NEGATIVE GLUCOSE NEGATIVE mg/dLKETONE NEGATIVE BILIRUBIN NEGATIVE BLOOD NEGATIVE
 NITRITE NEGATIVE LEUK SCREEN SMALL CASTS/LPF NEGATIVE /LPFCRYSTALS NEGATIVE 
MUCOUS THRDS NEGATIVE BACTERIA NEGATIVE EPITH CELLS FEW SQUAMOUS /HPFTRICHOMONAS
 NEGATIVE YEAST NEGATIVE 







History Of Immunizations







       Name    Date Admin    Mfg Name    Mfg Code    Trade Name    Lot#    Route    Inj    Vis Given    Vis

 Pub                                    CVX

 

        Influenza    2008    Not Entered    NE      Not Entered            Not Entered    Not Entered

                    1            999

 

        X       12/19/2008    [employer]    MSD     Pneumovax 23            Intramuscular    Not Entered

                    1            999

 

           Influenza    10/15/2010    [employer] Wave[employer] Arely.    NOV        Fluvirin > 12 Years    

[id number]     Intramuscular    Left Deltoid    10/15/2010    2009    999

 

          X         3/23/2015    [facility]       Prevnar 13    [id number]    Intramuscular

                Right Gluteous Medius    3/23/2015       2013       109







History of Past Illness







                    Name                Date of Onset       Comments

 

                    Peritoneal Neoplasm, Malignant                         

 

                    Hyperlipidemia                           

 

                    Bipolar disorder, unspecified                         

 

                    Artificial opening status; colostomy                         

 

                    B12 deficiency                           

 

                    Anemia, Pernicious                         

 

                    Arthritis unspecified                         

 

                    cervical cancer                          

 

                    Artificial opening status; colostomy    2010  1:10PM     

 

                    Bipolar disorder, unspecified    2010  1:10PM     

 

                    Hyperlipidemia      2010  1:10PM     

 

                    Anemia, Pernicious    2010  1:10PM     

 

                    Postoperative Follow-Up    2010  1:55PM     

 

                    Postoperative Follow-Up    Mar  8 2010 10:57AM     

 

                    Artificial opening status; colostomy    Mar  8 2010  1:19PM     

 

                    Peritoneal Neoplasm, Malignant    Mar  8 2010  1:19PM     

 

                    Artificial opening status; colostomy    2010  1:40PM     

 

                    Hyperlipidemia      2010  1:40PM     

 

                    Anemia, Pernicious    2010  1:40PM     

 

                    Peritoneal Neoplasm, Malignant    2010  1:40PM     

 

                    Arthritis unspecified    2010  1:40PM     

 

                    Anemia of Chronic Illness    2010  1:40PM     

 

                    Tinea corporis      2010  3:17PM     

 

                    Bipolar disorder, unspecified    2010  1:33PM     

 

                    Hyperlipidemia      2010  1:33PM     

 

                    Anemia, Pernicious    2010  1:33PM     

 

                    Peritoneal Neoplasm, Malignant    2010  1:33PM     

 

                    B12 deficiency      2010  1:33PM     

 

                    Ethmoidal Sinusitis, Acute    Sep 21 2010  3:53PM     

 

                    Wheezing            Sep 21 2010  3:53PM     

 

                    Flu                 Oct 15 2010  1:40PM     

 

                    Bipolar disorder, unspecified    Oct 15 2010  1:42PM     

 

                    Hyperlipidemia      Oct 15 2010  1:42PM     

 

                    Anemia, Pernicious    Oct 15 2010  1:42PM     

 

                    Peritoneal Neoplasm, Malignant    Oct 15 2010  1:42PM     

 

                    Bipolar disorder, unspecified    2011 12:01PM     

 

                    Hyperlipidemia      2011 12:01PM     

 

                    Anemia, Pernicious    2011 12:01PM     

 

                    Peritoneal Neoplasm, Malignant    2011 12:01PM     

 

                    Bipolar disorder, unspecified    Apr 15 2011 10:55AM     

 

                    Major Depression    2011 10:11AM     

 

                    Bipolar Disorder    2011 10:11AM     

 

                    Cancer              May 10 2011  4:16PM     

 

                    Major Depression    May 10 2011  3:16PM     

 

                    Bipolar Disorder    May 10 2011  3:16PM     

 

                    Hypercalcemia       May 23 2011  2:47PM     

 

                    Bipolar disorder, unspecified    May 23 2011  2:47PM     

 

                    Colon Cancer, Personal History    May 23 2011  2:47PM     

 

                    Bipolar Disorder    May 31 2011  4:39PM     

 

                    Depressive Disorder    2011 10:01AM     

 

                    Vitamin B12 deficiency    2011 10:01AM     

 

                    Vitamin D Deficiency    2011  5:07PM     

 

                    Anemia, Vitamin B12 Deficiency    2011  5:07PM     

 

                    B12 deficiency      2011  3:56PM     

 

                    Routine gynecological examination    Aug  4 2011  9:08AM     

 

                    Screening Examination for Breast Cancer    Aug  4 2011  9:08AM     

 

                    Tinea Corporis      Aug  4 2011  9:08AM     

 

                    Depressive Disorder    Sep 23 2011  8:47AM     

 

                    Contact Dermatitis    Sep 23 2011  8:47AM     

 

                    Anemia, Pernicious    Sep 23 2011  8:47AM     

 

                    B12 deficiency      Sep 23 2011  8:47AM     

 

                    B12 deficiency      Sep 27 2011  2:58PM     

 

                    B12 deficiency      Oct 20 2011  2:34PM     

 

                    Flu                 Dec  9 2011  3:16PM     

 

                    B12 deficiency      Dec  9 2011  3:17PM     

 

                    B12 deficiency      2012  4:52PM     

 

                    B12 deficiency      Feb 2012 11:10AM     

 

                    B12 deficiency      2012  3:37PM     

 

                    B12 deficiency      May  3 2012  4:10PM     

 

                    B12 deficiency      2012  2:54PM     

 

                    B12 deficiency      2012 11:23AM     

 

                    B12 deficiency      Aug  9 2012  2:08PM     

 

                    B12 deficiency      Sep  6 2012  4:36PM     

 

                    B12 deficiency      Oct 16 2012 10:23AM     

 

                    Flu                 Feb  4   3:11PM     

 

                    Bipolar disorder, unspecified    Feb  2013  2:48PM     

 

                    Anemia, Pernicious    Feb  4   2:48PM     

 

                    B12 deficiency      Feb  4   2:48PM     

 

                    Extrapyramidal abnormal movement disorder    Feb  4   2:48PM     

 

                    B12 deficiency      Apr  3 2013 12:03PM     

 

                    Bipolar disorder, unspecified    May  7 2013  1:31PM     

 

                    Anemia, Pernicious    May  7 2013  1:31PM     

 

                    B12 deficiency      May  7 2013  1:31PM     

 

                    Extrapyramidal abnormal movement disorder    May  7 2013  1:31PM     

 

                    B12 deficiency      2013  3:42PM     

 

                    B12 deficiency      2013  1:31PM     

 

                    Hyperlipidemia      Aug  7 2013 10:37AM     

 

                    Vitamin D Deficiency    Aug  7 2013 10:37AM     

 

                    Bipolar disorder, unspecified    Aug  7 2013 10:37AM     

 

                    Anemia, Pernicious    Aug  7 2013 10:37AM     

 

                    B12 deficiency      Aug  7 2013 10:37AM     

 

                    B12 deficiency      Sep 25 2013 11:15AM     

 

                    B12 deficiency      Dec 11 2013  3:16PM     

 

                    B12 deficiency      Mar  6 2014  1:48PM     

 

                    B12 deficiency      May 21 2014  3:17PM     

 

                    Screening Examination for Breast Cancer    2014  3:23PM     

 

                    Periumbilical abdominal pain    2014  3:23PM     

 

                    B12 deficiency      Jul 10 2014  2:52PM     

 

                    Anemia, Vitamin B12 Deficiency    Aug 13 2014  4:50PM     

 

                    Bipolar disorder    Oct 16 2014 11:13AM     

 

                    Hyperlipidemia      Oct 16 2014 11:13AM     

 

                    Anemia, Pernicious    Oct 16 2014 11:13AM     

 

                    Peritoneal Neoplasm, Malignant    Oct 16 2014 11:13AM     

 

                    Screening breast examination    Oct 16 2014 11:13AM     

 

                    Weight loss         Oct 16 2014 11:13AM     

 

                    Anemia, Pernicious    Mar 23 2015  2:57PM     

 

                    B12 deficiency      Mar 23 2015  2:57PM     

 

                    Need for Prevnar vaccine    Mar 23 2015  2:57PM     

 

                    Bipolar disorder    Mar 23 2015  2:57PM     

 

                    Hyperlipidemia      Mar 23 2015  2:57PM     

 

                    Anemia, Pernicious    Mar 23 2015  2:57PM     

 

                    Peritoneal Neoplasm, Malignant    Mar 23 2015  2:57PM     

 

                    B12 deficiency      May  4 2015  4:48PM     

 

                    Hyperlipidemia      May 13 2015  9:56AM     

 

                    Anemia              May 13 2015  9:56AM     

 

                    Bipolar disorder    May 13 2015  9:56AM     

 

                    Bipolar disorder    May 14 2015  3:27PM     

 

                    Hyperlipidemia      May 14 2015  3:27PM     

 

                    Anemia, Pernicious    May 14 2015  3:27PM     

 

                    Peritoneal Neoplasm, Malignant    May 14 2015  3:27PM     

 

                    B12 deficiency      2015  2:20PM     

 

                    B12 deficiency      2015 11:34AM     

 

                    B12 deficiency      Aug 18 2015  9:06AM     

 

                    Tinea Corporis      Sep 18 2015  8:54AM     

 

                    B12 deficiency      Sep 18 2015  8:54AM     

 

                    B12 deficiency      2015 10:28AM     

 

                    Herpes zoster without complication    Dec  3 2015  9:52AM     

 

                    B12 deficiency      Dec 23 2015 11:21AM     

 

                    B12 deficiency      2016  4:51PM     

 

                    Vitamin B 12 deficiency    Mar 14 2016  5:35PM     

 

                    B12 deficiency      Mar 15 2016 12:14PM     

 

                    B12 deficiency      May  5 2016 11:30AM     

 

                    Edema               May  5 2016 11:30AM     

 

                    Dermatitis          May  5 2016 11:30AM     

 

                    Edema               May 17 2016  8:38AM     

 

                    Shortness of breath    May 17 2016  8:38AM     







Payers







           Insurance Name    Company Name    Plan Name    Plan Number    Policy Number    Policy Group

 Number                                 Start Date

 

                    Medicare Part A    Medicare [facility]              [id number]              N/A

 

                          [employer]                 [id number]

                                                    

 

                    Medicare Part A    Medicare - Lab/Xray              [id number]              2006

 

                    Medicare Part B    [employer] [place]              [id number]              2006

 

                          [employer]    [employer]                 50 percent

                                                    2009

 

                    [facility]              [id number]              N/A

 

                    Medicare Part A    Medicare Part A              [id number]              N/A

 

                    Medicare Part A    Medicare Part A              [id number]              2006









History of Encounters







                    Visit Date          Visit Type          Provider

 

                    2016            Office visit        Bhupinder Aspen DO

 

                    3/15/2016           Nurse visit         Bhupinder Aspen DO

 

                    2016            Nurse visit         Bhupinder Aspen DO

 

                    2015          Nurse visit         Bhupinder Aspen DO

 

                    12/3/2015           Office visit        Bhupinder Aspen DO

 

                    2015          Nurse visit         Bhupinder Aspen DO

 

                    2015           Office visit        Bhupinder Aspen DO

 

                    2015           Nurse visit         Bhupinder Aspen DO

 

                    2015            Nurse visit         Bhupinder Aspen DO

 

                    2015            Nurse visit         Bhupinder Aspen DO

 

                    2015           Office visit        Bhupinder Aspen DO

 

                    2015            Nurse visit         Bhupinder Aspen DO

 

                    3/23/2015           Office visit        Bhupinder Aspen DO

 

                    10/16/2014          Office visit        Bhupinder Aspen DO

 

                    2014           Nurse visit         Radha GREEN

 

                    7/10/2014           Nurse visit         Bhupinder Aspen DO

 

                    2014           Office visit        Bhupinder Aspen DO

 

                    2014           Nurse visit         Bhupinder Aspen DO

 

                    3/6/2014            Nurse visit         Bhupinder Aspen DO

 

                    2014            [facility]            EARNEST Lopez MD

 

                    2013          Nurse visit         Bhupinder Aspen DO

 

                    2013           Nurse visit         Bhupinder Aspen DO

 

                    2013            Office visit        Bhupinder Aspen DO

 

                    2013            Nurse visit         Bhupinder Aspen DO

 

                    2013            Nurse visit         Bhupinder Aspen DO

 

                    2013            Office visit        Bhupinder Aspen DO

 

                    4/3/2013            Nurse visit         Bhupinder Aspen DO

 

                    2013            Office visit        Bhupinder Aspen DO

 

                    10/16/2012          Nurse visit         Bhupinder Aspen DO

 

                    2012            Nurse visit         Bhupinder Aspen DO

 

                    2012            Voided              Bhupinder Aspen DO

 

                    2012            Nurse visit         Bhupinder Aspen DO

 

                    2012            Nurse visit         Bhupinder Aspen DO

 

                    2012           Nurse visit         Bhupinder Aspen DO

 

                    5/3/2012            Nurse visit         Bhupinder Aspen DO

 

                    2012           Nurse visit         Bhupinder Aspen DO

 

                    2012           Nurse visit         Bhupinder Aspen DO

 

                    2012           Nurse visit         Bhupinder Aspen DO

 

                    2011           Nurse visit         Bhupinder Aspen DO

 

                    10/20/2011          Nurse visit         Bhupinder Apsen DO

 

                    2011           Office visit        Bhupinder Aspen DO

 

                    2011           Nurse visit         Radha Weston GREEN

 

                    2011            Office visit        Bhupinder Aspen DO

 

                    2011           Nurse visit         Bhupinder Aspen DO

 

                    2011            Office visit        Bhupinder Aspen DO

 

                    2011           Office visit        Bhupinder Aspen DO

 

                    5/10/2011           Office visit        Bhupinder Aspen DO

 

                    2011           Office visit        Bhupinder Aspen DO

 

                    4/15/2011           Office visit        Devin Angel DO

 

                    2011           Office visit        Devin Angel DO

 

                    10/15/2010          Office visit        Devin Angel DO

 

                    2010           Office visit        Devin Angel DO

 

                    2010            Office visit        Devin Angel DO

 

                    2010           Office visit        Devin Angel DO

 

                    2010            Office visit        Devin Angel DO

 

                    3/8/2010            Office visit        Devin Masterson MD

 

                    2010            Surgery             Devin Masterson MD

 

                    2010            Office visit        Devin Angel DO

 

                    2010           Surgery             Devin Masterson MD

 

                    2010           Hospital            Devin Masterson MD

 

                    2010           [facility]            Devin Masterson MD

 

                    10/22/2009          Office visit        Devin Angel DO

## 2019-06-26 NOTE — XMS REPORT
MU2 Ambulatory Summary

                             Created on: 2017



Pauline Gan

External Reference #: [id number]

: 1950

Sex: Female



Demographics







                          Address                   [address]

[place]  [address]

 

                          Home Phone                [phone]

 

                          Preferred Language        English

 

                          Marital Status            Legally 

 

                          Amish Affiliation     Unknown

 

                          Race                      White

 

                          Ethnic Group              Not  or 





Author







                          Author                    Bhupinder Louise

 

                          [facility]

 

                          Address                   [address]

[place]  [phone]



 

                          Phone                     [phone]







Care Team Providers







                    Care Team Member Name    Role                Phone

 

                    Bhupinder Louise    PCP                 Unavailable

 

                    Bhupinder Louise    PreferredProvider    Unavailable







Allergies and Adverse Reactions







                    Name                Reaction            Notes

 

                    NO KNOWN DRUG ALLERGIES                         







Plan of Treatment







             Planned Activity    Comments     Planned Date    Planned Time    Plan/Goal

 

             Injection,Subcutaneous/Intramuscul, [facility]                 2017    12:00 AM      

 

             URINALYSIS ROUTINE C&S IF IND                 2017    12:00 AM      







Medications







                                        Active 

 

             Name         Start Date    Estimated Completion Date    SIG          Comments

 

                Latuda 20 mg oral tablet                                    take 1 tablet (20 mg) by oral route once daily with

 food (at least 350 calories)            

 

             pravastatin 40 mg oral tablet    3/30/2015                 TAKE 1 TABLET BY MOUTH DAILY     

 

                Namenda XR 28 mg oral capsule,sprinkle,ER 24hr    2015                       take 1 capsule (28

 mg) by oral route once daily            

 

                Namenda XR 28 mg oral capsule,sprinkle,ER 24hr    2016                       take 1 capsule (28

 mg) by oral route once daily            

 

                potassium chloride 10 mEq oral tablet extended release    2016                       take 1 tablet

 (10 meq) by oral route once daily       

 

             pravastatin 40 mg oral tablet    2016                 TAKE 1 TABLET BY MOUTH DAILY     

 

                Vitamin B-12 1,000 mcg/mL injection solution    2016                       inject 1 milliliter 

(1,000 mcg) by intramuscular route once a month     

 

                potassium chloride 10 mEq oral tablet extended release    2016                      take 1 tablet

 (10 meq) by oral route once daily       

 

                Namenda XR 28 mg oral capsule,sprinkle,ER 24hr    2016                      TAKE 1 CAPSULE BY

 MOUTH EVERY DAY                         

 

                furosemide 40 mg oral tablet    2016                      take 1 tablet (40 mg) by oral route

 once daily                              

 

                mirtazapine 45 mg oral tablet                                    take 1 tablet (45 mg) by oral route once daily

 before bedtime                          

 

             Fish Oil 300-1,000 mg oral capsule                              take 1 capsule by oral route daily     

 

             Vitamin D3 1,000 unit oral tablet                              take 1 tablet by oral route daily     

 

                Calcium 600 600 mg calcium (1,500 mg) oral tablet                                    take 1 tablet by oral route

 daily                                   

 

                Aspirin Low Dose 81 mg oral tablet,delayed release (DR/EC)                                    take 1 tablet 

(81 mg) by oral route once daily         

 

                metoclopramide HCl 10 mg oral tablet    2017                       take 1 tablet by oral route 

2 times a day for 50 days                

 

             furosemide 40 mg oral tablet    2017                 TAKE 1 TABLET BY MOUTH DAILY     

 

                Namenda XR 28 mg oral capsule,sprinkle,ER 24hr    2017                       TAKE 1 CAPSULE BY 

MOUTH EVERY DAY                          

 

                potassium chloride 10 mEq oral tablet extended release    2017                       TAKE 1 TABLET

 BY MOUTH DAILY                          

 

                Linzess 72 mcg oral capsule    2017                       take 1 capsule (72 mcg) by oral route

 once daily on an empty stomach at least 30 minutes before 1st meal of the day     



 

             pravastatin 40 mg oral tablet    2017                 TAKE 1 TABLET BY MOUTH DAILY     









                                         

 

             Name         Start Date    Expiration Date    SIG          Comments

 

             Reglan 10mg    3/29/2010    2010    one ac and hs     

 

                Keflex 500 mg oral capsule    2010       10/1/2010       take 1 capsule (500 mg) by oral

 route every 6 hours for 10 days         

 

                Bactrim -160 mg oral tablet    2011       take 1 tablet by oral route

 every 12 hours for 7 days               

 

                triamcinolone acetonide 0.1 % topical cream    2011      apply a thin

 layer to the affected area(s) by topical route 2 times per day     

 

                sertraline 100 mg oral tablet    4/10/2012       5/10/2012       take 1.5 tablets by oral route

 daily for 30 days                       

 

                ergocalciferol (vitamin D2) 50,000 unit oral capsule    4/15/2013       2013       TAKE

 ONE CAPSULE BY MOUTH ONCE A WEEK        

 

                CYANOCOBALAM 1000MCGINJ 1000 milliliter    2013       INJECT 1ML INTRAMUSCULAR

 ONCE A MONTH                            

 

                pravastatin 40 mg oral tablet    3/25/2014       3/20/2015       TAKE ONE TABLET BY MOUTH EVERY

 DAY                                     

 

                          Zostavax (PF) 19,400 unit/0.65 mL subcutaneous suspension for reconstitution    3/23/2015

                    3/24/2015           inject 0.65 milliliter by subcutaneous route once     

 

                famciclovir 500 mg oral tablet    12/3/2015       12/10/2015      take 1 tablet (500 mg) by

 oral route every 8 hours for 7 days     

 

                furosemide 40 mg oral tablet    2016      take 1 tablet (40 mg) by oral

 route once daily                        

 

                Cipro 500 mg oral tablet    2016       take 1 tablet (500 mg) by oral route

 2 times per day for 5 days              

 

                Bactrim -160 mg oral tablet    2016        take 1 tablet by oral route

 every 12 hours for 7 days               

 

                metoclopramide HCl 10 mg oral tablet    2017       take 1 tablet by oral

 route 2 times a day for 50 days         

 

                Macrobid 100 mg oral capsule    2017       take 1 capsule (100 mg) by oral

 route 2 times per day with food for 7 days     

 

                Augmentin 875-125 mg oral tablet    2017       take 1 tablet by oral route

 every 12 hours for 7 days               

 

                amoxicillin 500 mg oral tablet    2017       take 1 tablet (500 mg) by oral

 route every 12 hours for 7 days         

 

                cefuroxime axetil 500 mg oral tablet    2017       take 1 tablet (500 mg)

 by oral route 2 times per day for 7 days     

 

                ciprofloxacin HCl 500 mg oral tablet    2017       take 1 tablet (500 mg)

 by oral route every 12 hours for 5 days     









                                        Discontinued 

 

             Name         Start Date    Discontinued Date    SIG          Comments

 

                Tylenol 325 mg oral tablet                    2013        take 1 - 2 tablets (325 -650 mg) by oral

 route every 4-6 hours as needed         

 

                Calcium 600 + D(3) 600 mg(1,500mg) -400 unit oral tablet                    2011       take 1 tablet

 by oral route 2 times a day            no longer taking

 

                Vitamin B-12 1,000 mcg oral tablet extended release    2010       take 1

 tablet by oral route daily             no longer taking

 

                Antifungal (clotrimazole) 1 % topical cream    2010       apply to the 

affected and surrounding areas of skin by topical route 2 times per day morning 
and evening                              

 

                sertraline 100 mg oral tablet    5/10/2011       2011       take 2 tablets (200 mg) by 

oral route once daily                   discontinued by Dr. Serrano

 

                mirtazapine 15 mg oral tablet                    2011        take 1 tablet (15 mg) by oral route 

once daily before bedtime               Dr. Serrano

 

                mirtazapine 15 mg oral tablet                    2011        take 1 tablet (15 mg) by oral route 

once daily before bedtime               dc'd by Dr. Serrano

 

                Pristiq 50 mg oral tablet extended release 24 hr                    2013        take 1 tablet (50

 mg) by oral route once daily           Dr. Serrano

 

                Pristiq 50 mg oral tablet extended release 24 hr                    2013        take 1 tablet (50

 mg) by oral route once daily           dose updated

 

                Vitamin B-12 1,000 mcg/mL injection solution    2011        inject 1 milliliter

 (1,000 mcg) by intramuscular route once a month    on list already

 

                    syringe with needle 1 mL 25 gauge x 1" miscellaneous syringe    2011

                          use for injection once a month     

 

                clotrimazole 1 % topical cream    2011        apply to the affected and surrounding

 areas of skin by topical route 2 times per day in the morning and evening     

 

                Vitamin D2 50,000 unit oral capsule    2011        take 1 capsule (50,000

 unit) by oral route once weekly        generic on list

 

                Pravachol 40 mg oral tablet    2012        take 1 tablet (40 mg) by oral 

route once daily for 90 days            generic on list

 

                lithium carbonate 300 mg oral capsule    2012        take 1 capsule by oral

 route daily                            dose updated

 

                Pristiq 100 mg oral tablet extended release 24 hr                    4/10/2012       take 1 and 1/2 

tablet (150 mg) by oral route once daily    Mental Health provider

 

                Pristiq 100 mg oral tablet extended release 24 hr                    4/10/2012       take 1 and 1/2 

tablet (150 mg) by oral route once daily    Discontinued by Dr Efrain Knight at [facility]

 

                hydroxyzine HCl 50 mg oral tablet    10/16/2014      2015       take 1 tablet (50 mg) 

by oral route at bedtime                 

 

                lithium carbonate 300 mg oral capsule    2015       take 1 capsule (300

 mg) by oral route 2 for 30 days         

 

                fluconazole 100 mg oral tablet    2015       12/3/2015       take 1 tablet (100 mg) by 

oral route once a week                   

 

                ketoconazole 2 % topical cream    2015       12/3/2015       apply to the affected area(s)

 by topical route 2 times per day        

 

                prednisone 10 mg oral tablet    12/3/2015       2016        take 2 tablets (20 mg) by oral

 route once daily for 4 days 1 tablet daily for 4 days 0.5 tablet daily for 4 
days                                     

 

                triamcinolone acetonide 0.1 % topical cream    2016       apply a thin layer

 to the affected area(s) by topical route 2 times per day     

 

                Cipro 500 mg oral tablet    1/15/2017       2017       take 1 tablet (500 mg) by oral route

 every 12 hours for 10 days              







Problem List







                    Description         Status              Onset

 

                    Artificial opening status; colostomy    Active               

 

                    Bipolar disorder, unspecified    Active               

 

                    Hyperlipidemia      Active               

 

                    Peritoneal Neoplasm, Malignant    Active               

 

                    Anemia, Pernicious    Active               

 

                    Arthritis unspecified    Active               

 

                    B12 deficiency      Active               







Vital Signs







      Date    Time    BP-Sys(mm[Hg]    BP-Lynn(mm[Hg])    HR(bpm)    RR(rpm)    Temp    WT    HT    HC    BMI

                    BSA                 BMI Percentile      O2 Sat(%)

 

        2017    1:34:00 PM    118 mmHg    62 mmHg    122 bpm    18 rpm    97.8 F    161.375 lbs    

69 in                     23.83 kg/m2    1.89 m2                   96 %

 

        2017    3:05:00 PM    134 mmHg    70 mmHg    70 bpm    20 rpm    97.4 F    181 lbs    69 in

                          26.7288 kg/m    1.9992 m                 98 %

 

        2017    11:07:00 AM    124 mmHg    64 mmHg    62 bpm    17 rpm    98.2 F    181.2 lbs    69

 in                       26.76 kg/m2    2.00 m2                   98 %

 

        1/15/2017    3:34:00 PM    148 mmHg    89 mmHg    69 bpm    20 rpm    98.2 F    179 lbs    69 in

                          26.4334 kg/m    1.9882 m                 98 %

 

       2017    1:51:00 PM    160 mmHg    90 mmHg    100 bpm    20 rpm    96.5 F    179 lbs             

                                                                98 %

 

       2016    3:11:00 PM    134 mmHg    76 mmHg    80 bpm    20 rpm    98 F    163 lbs    69 in     

                24.0706 kg/m    1.8972 m                      98 %

 

        2016    2:04:00 PM    142 mmHg    86 mmHg    68 bpm    16 rpm    98.5 F    166 lbs    63 in

                          29.41 kg/m2    1.83 m2                   100 %

 

        2016    11:27:00 AM    148 mmHg    78 mmHg    90 bpm    20 rpm    98.2 F    153 lbs    69 in

                          22.5939 kg/m    1.8381 m                 96 %

 

        12/3/2015    9:50:00 AM    132 mmHg    70 mmHg    62 bpm    16 rpm    97.9 F    145 lbs    69 in

                          21.41 kg/m2    1.79 m2                   100 %

 

        2015    8:52:00 AM    132 mmHg    68 mmHg    52 bpm    20 rpm    97.8 F    141 lbs    69 in

                          20.8218 kg/m    1.7645 m                 100 %

 

        2015    3:25:00 PM    120 mmHg    62 mmHg    72 bpm    16 rpm    98.1 F    136 lbs    69 in

                          20.08 kg/m2    1.73 m2                   98 %

 

       3/23/2015    2:55:00 PM    130 mmHg    76 mmHg    68 bpm    18 rpm    97 F    140 lbs    69 in    

                20.6742 kg/m    1.7583 m                      98 %

 

        10/16/2014    11:11:00 AM    120 mmHg    66 mmHg    77 bpm    20 rpm    98 F    130 lbs    69 in

                          19.20 kg/m2    1.69 m2                   100 %

 

        2014    3:21:00 PM    130 mmHg    66 mmHg    63 bpm    18 rpm    97.2 F    160 lbs    69 in

                          23.6276 kg/m    1.8797 m                 99 %

 

        2013    10:35:00 AM    132 mmHg    70 mmHg    66 bpm    20 rpm    98.1 F    157 lbs    69 in

                          23.18 kg/m2    1.86 m2                    

 

        2013    1:29:00 PM    132 mmHg    70 mmHg    76 bpm    18 rpm    98.2 F    166 lbs    69 in 

                          24.5137 kg/m    1.9146 m                  

 

       2013    2:46:00 PM    128 mmHg    70 mmHg    76 bpm    16 rpm    98 F    160 lbs    69 in     

                23.63 kg/m2     1.88 m2                          

 

        2011    8:49:00 AM    128 mmHg    78 mmHg    70 bpm    18 rpm    97.9 F    164 lbs    69 in

                          24.2183 kg/m    1.903 m                  

 

     2011    1:31:00 PM    132 mmHg    68 mmHg    84 bpm         97 F    167 lbs                        

                                         

 

        2011    9:09:00 AM    128 mmHg    70 mmHg    72 bpm    18 rpm    98.2 F    163 lbs    64 in 

                          27.9786 kg/m    1.8272 m                  

 

       2011    10:01:00 AM    132 mmHg    70 mmHg    72 bpm    18 rpm    98.2 F    154 lbs             

                                                                 

 

       2011    2:47:00 PM    128 mmHg    70 mmHg    72 bpm    18 rpm    97.8 F    156 lbs             

                                                                 

 

       5/10/2011    3:16:00 PM    144 mmHg    80 mmHg    72 bpm    18 rpm    98.2 F    158 lbs             

                                                                 

 

        2011    10:11:00 AM    132 mmHg    70 mmHg    70 bpm    18 rpm    98.2 F    168 lbs    69 in

                          24.809 kg/m    1.9261 m                  

 

        4/15/2011    10:52:00 AM    110 mmHg    60 mmHg    75 bpm    16 rpm    97.5 F    172.375 lbs    

69 in                     25.46 kg/m2    1.95 m2                   100 %

 

        2011    11:43:00 AM    120 mmHg    82 mmHg    75 bpm    16 rpm    97.2 F    178.5 lbs    69

 in                       26.3596 kg/m    1.9854 m                 100 %

 

        10/15/2010    1:32:00 PM    120 mmHg    70 mmHg    80 bpm    18 rpm    96.6 F    177 lbs    69 in

                          26.14 kg/m2    1.98 m2                   100 %

 

        2010    3:50:00 PM    168 mmHg    100 mmHg    82 bpm    18 rpm    97.8 F    177.5 lbs    69

 in                       26.2119 kg/m    1.9798 m                 97 %

 

        2010    1:21:00 PM    140 mmHg    80 mmHg    59 bpm    16 rpm    97.6 F    173.25 lbs    69 

in                        25.58 kg/m2    1.96 m2                   100 %

 

        2010    3:02:00 PM    140 mmHg    80 mmHg    61 bpm    16 rpm    97.6 F    173.125 lbs    69

 in                       25.5658 kg/m    1.9553 m                 99 %

 

        2010    1:23:00 PM    130 mmHg    80 mmHg    66 bpm    16 rpm    96.8 F    173 lbs    69 in 

                          25.55 kg/m2    1.95 m2                   100 %

 

        2010    12:58:00 PM    130 mmHg    88 mmHg    75 bpm    16 rpm    98.4 F    172.25 lbs    69

 in                       25.4366 kg/m    1.9503 m                 100 %







Social History







                    Name                Description         Comments

 

                    denies alcohol use                         

 

                    denies smoking                           

 

                    Denies illicit substance abuse                         

 

                    retired                                 direct care

 

                    Single                                   

 

                    Exercises regularly                         

 

                    Attended some college                         

 

                    Tobacco             Never smoker         







History of Procedures







                    Date Ordered        Description         Order Status

 

                    2010 12:00 AM    COMPREHEN METABOLIC PANEL    Reviewed

 

                    2010 12:00 AM    COMPLETE CBC W/AUTO DIFF WBC    Reviewed

 

                    2010 12:00 AM    LIPID PANEL         Reviewed

 

                          2015 12:00 AM        B12 Injection, Up to 1000 Mcg NDC#0110-8537-60 [facility] 

                                        Reviewed

 

                    2011 12:00 AM    MAMMOGRAM SCREENING    Reviewed

 

                    2011 12:00 AM    CYTOPATH C/V THIN LAYER    Reviewed

 

                    2011 12:00 AM    B12 Injection 1 cc NDC#[address]-13    Reviewed

 

                    2015 12:00 AM    THER/PROPH/DIAG INJ SC/IM    Reviewed

 

                    2015 12:00 AM    B12 Injection, Up to 1000 Mcg NDC#4661-8337-14    Reviewed

 

                    2011 12:00 AM    THER/PROPH/DIAG INJ SC/IM    Reviewed

 

                    2011 12:00 AM    B12 Injection([place]) Ndc#4373-7767-37-    Reviewed

 

                    2015 12:00 AM    THER/PROPH/DIAG INJ SC/IM    Reviewed

 

                    2015 12:00 AM    B12 Injection, Up to 1000 Mcg NDC#9795-8118-23    Reviewed

 

                    10/20/2011 12:00 AM    THER/PROPH/DIAG INJ SC/IM    Reviewed

 

                    10/20/2011 12:00 AM    B12 Injection([place]) Ndc#4322-6234-72-    Reviewed

 

                    2016 12:00 AM    THER/PROPH/DIAG INJ SC/IM    Reviewed

 

                    2016 12:00 AM    B12 Injection, Up to 1000 Mcg NDC#4675-3293-30    Reviewed

 

                    3/14/2016 12:00 AM    VITAMIN B-12        Reviewed

 

                    3/15/2016 12:00 AM    THER/PROPH/DIAG INJ SC/IM    Reviewed

 

                    3/15/2016 12:00 AM    B12 Injection, Up to 1000 Mcg NDC#8657-7454-23    Reviewed

 

                    2011 12:00 AM    ***Immunization administration, Medicare flu    Reviewed

 

                    2011 12:00 AM    Fluzone ** MEDICARE Only **    Reviewed

 

                    2011 12:00 AM    THER/PROPH/DIAG INJ SC/IM    Reviewed

 

                    2011 12:00 AM    B12 Injection ([employer]) Ndc#5795-5816-09    Reviewed

 

                    2016 12:00 AM    B12 Injection, Up to 1000 Mcg NDC#0862-3663-04 [facility]    

Reviewed

 

                    2016 12:00 AM    TTE W/DOPPLER COMPLETE    Reviewed

 

                    2016 12:00 AM    EXTREMITY STUDY     Reviewed

 

                          2016 12:00 AM        B12 Injection, Up to 1000 Mcg NDC#6521-0417-16 [facility] 

                                        Reviewed

 

                    2016 12:00 AM    THER/PROPH/DIAG INJ SC/IM    Reviewed

 

                    2016 12:00 AM    B12 Injection, Up to 1000 Mcg NDC#5491-9325-29    Reviewed

 

                    2016 12:00 AM    THER/PROPH/DIAG INJ SC/IM    Reviewed

 

                    2012 12:00 AM    B12 Injection([place]) Ndc#2531-1216-55-    Reviewed

 

                    2016 12:00 AM    B12 Injection, Up to 1000 Mcg NDC#5756-7079-01    Reviewed

 

                    2016 12:00 AM    THER/PROPH/DIAG INJ SC/IM    Reviewed

 

                    2012 12:00 AM    THER/PROPH/DIAG INJ SC/IM    Reviewed

 

                    2012 12:00 AM    B12 Injection ([employer]) Ndc#4001-4106-34    Reviewed

 

                    2016 12:00 AM    THER/PROPH/DIAG INJ SC/IM    Reviewed

 

                    2016 12:00 AM    B12 Injection, Up to 1000 Mcg NDC#3100-8976-28    Reviewed

 

                    2016 12:00 AM    B12 Injection, Up to 1000 Mcg NDC#2792-7807-92    Reviewed

 

                    2016 12:00 AM    THER/PROPH/DIAG INJ SC/IM    Reviewed

 

                    2012 12:00 AM    THER/PROPH/DIAG INJ SC/IM    Reviewed

 

                    2012 12:00 AM    B12 Injection([place]) Ndc#5869-3820-78-    Reviewed

 

                    12/15/2016 12:00 AM    B12 Injection, Up to 1000 Mcg NDC#6176-5607-52    Reviewed

 

                    12/15/2016 12:00 AM    THER/PROPH/DIAG INJ SC/IM    Reviewed

 

                    2016 12:00 AM    URNLS DIP STICK/TABLET RGNT AUTO W/O MICROSCOPY    Reviewed

 

                    1/3/2017 12:00 AM    URNLS DIP STICK/TABLET RGNT AUTO W/O MICROSCOPY    Reviewed

 

                    2017 12:00 AM    URINE CULTURE/COLONY COUNT    Reviewed

 

                    2017 12:00 AM    Rocephin 1 gram Injection, [facility]    Reviewed

 

                    2017 12:00 AM    THERAPEUTIC PROPHYLACTIC/DX INJECTION SUBQ/IM    Reviewed

 

                    2017 12:00 AM    B12 1000mcg Injection, [facility] Medicare    Reviewed

 

                    5/3/2012 12:00 AM    THER/PROPH/DIAG INJ SC/IM    Reviewed

 

                    5/3/2012 12:00 AM    B12 Injection([facility]) Ndc#8103-5164-61-    Reviewed

 

                    2017 12:00 AM    THERAPEUTIC PROPHYLACTIC/DX INJECTION SUBQ/IM    Reviewed

 

                    2017 12:00 AM    B12 1000mcg Injection, [facility]    Reviewed

 

                    2017 12:00 AM    MRI BRAIN STEM W/O & W/DYE    Reviewed

 

                    2017 12:00 AM    VITAMIN B-12        Reviewed

 

                    2017 12:00 AM    Speech Therapy Consult    Reviewed

 

                    2017 12:00 AM    ASSAY OF CREATININE    Reviewed

 

                    2012 12:00 AM    IMMUNOTHERAPY INJECTIONS    Reviewed

 

                    2012 12:00 AM    B12 Injection([facility]) Ndc#0057-7336-99-    Reviewed

 

                    2017 12:00 AM    URINALYSIS AUTO W/SCOPE    Reviewed

 

                    2012 12:00 AM    THER/PROPH/DIAG INJ SC/IM    Reviewed

 

                    2012 12:00 AM    B12 Injection, Up to 1000 Mcg NDC#5991-7288-32    Reviewed

 

                    2017 12:00 AM    URINALYSIS AUTO W/SCOPE    Reviewed

 

                    2017 2:18 PM    URINALYSIS AUTO W/O SCOPE    Reviewed

 

                    2017 12:00 AM    URINE CULTURE/COLONY COUNT    Reviewed

 

                    2017 12:00 AM    B12 1000mcg Injection    Reviewed

 

                    2012 12:00 AM    THER/PROPH/DIAG INJ SC/IM    Reviewed

 

                    2012 12:00 AM    B12 Injection, Up to 1000 Mcg NDC#8545-5464-46    Reviewed

 

                    2012 12:00 AM    THER/PROPH/DIAG INJ SC/IM    Reviewed

 

                    2012 12:00 AM    B12 Injection, Up to 1000 Mcg NDC#1443-1376-65    Reviewed

 

                    10/16/2012 12:00 AM    THER/PROPH/DIAG INJ SC/IM    Reviewed

 

                    10/16/2012 12:00 AM    B12 Injection, Up to 1000 Mcg NDC#4446-2519-61    Reviewed

 

                    2010 12:00 AM    COMPREHEN METABOLIC PANEL    Reviewed

 

                    2010 12:00 AM    COMPLETE CBC W/AUTO DIFF WBC    Reviewed

 

                    2010 12:00 AM    LIPID PANEL         Reviewed

 

                    2013 12:00 AM    Flu Injection 3 Years And Above NDC# [address]-72  [facility]    Reviewed



 

                    2013 12:00 AM    COMPLETE CBC W/AUTO DIFF WBC    Reviewed

 

                    2013 12:00 AM    ASSAY OF LITHIUM    Reviewed

 

                    2013 12:00 AM    METABOLIC PANEL TOTAL CA    Reviewed

 

                    4/3/2013 12:00 AM    THER/PROPH/DIAG INJ SC/IM    Reviewed

 

                    4/3/2013 12:00 AM    B12 Injection, Up to 1000 Mcg NDC#8529-9399-33    Reviewed

 

                    2013 12:00 AM    THER/PROPH/DIAG INJ SC/IM    Reviewed

 

                    2013 12:00 AM    B12 Injection, Up to 1000 Mcg NDC#0015-5419-08    Reviewed

 

                    2013 12:00 AM    THER/PROPH/DIAG INJ SC/IM    Reviewed

 

                    2013 12:00 AM    B12 Injection, Up to 1000 Mcg NDC#5089-4266-84    Reviewed

 

                    2013 12:00 AM    LIPID PANEL         Reviewed

 

                    2013 12:00 AM    VITAMIN D 25 HYDROXY    Reviewed

 

                    2013 12:00 AM    THER/PROPH/DIAG INJ SC/IM    Reviewed

 

                    2013 12:00 AM    B12 Injection, Up to 1000 Mcg NDC#6716-9333-83    Reviewed

 

                    2013 12:00 AM    THER/PROPH/DIAG INJ SC/IM    Reviewed

 

                    3/6/2014 12:00 AM    THER/PROPH/DIAG INJ SC/IM    Reviewed

 

                    2014 12:00 AM    THER/PROPH/DIAG INJ SC/IM    Reviewed

 

                    2014 12:00 AM    B12 Injection, Up to 1000 Mcg NDC#2658-1010-94    Reviewed

 

                    2010 12:00 AM    SKIN FUNGI CULTURE    Reviewed

 

                    10/9/2010 12:00 AM    COMPREHEN METABOLIC PANEL    Reviewed

 

                    10/9/2010 12:00 AM    LIPID PANEL         Reviewed

 

                    2010 12:00 AM    THER/PROPH/DIAG INJ SC/IM    Reviewed

 

                    2010 12:00 AM    B12 Injection Ndc#[address]-06 ([facility])    Reviewed

 

                    2010 12:00 AM    THER/PROPH/DIAG INJ SC/IM    Reviewed

 

                    2010 12:00 AM    Kenalog 40 Mg Im-Nd#[address]-55 ([facility])    Reviewed

 

                    10/15/2010 12:00 AM    FLU VACCINE 3 YRS & > IM    Reviewed

 

                    10/15/2010 12:00 AM    Admin.Of M/C Cov.Vaccine-Flu Vacc.    Reviewed

 

                    1/15/2011 12:00 AM    COMPLETE CBC W/AUTO DIFF WBC    Reviewed

 

                    1/15/2011 12:00 AM    COMPREHEN METABOLIC PANEL    Reviewed

 

                    1/15/2011 12:00 AM    LIPID PANEL         Reviewed

 

                    2014 12:00 AM    MAMMOGRAM SCREENING    Reviewed

 

                    2014 12:00 AM    Screening mammography, bilateral    Reviewed

 

                    7/10/2014 12:00 AM    THER/PROPH/DIAG INJ SC/IM    Reviewed

 

                    7/10/2014 12:00 AM    B12 Injection, Up to 1000 Mcg NDC#2361-5413-44    Reviewed

 

                    2011 12:00 AM    COMPLETE CBC W/AUTO DIFF WBC    Reviewed

 

                    2011 12:00 AM    COMPREHEN METABOLIC PANEL    Reviewed

 

                    2011 12:00 AM    LIPID PANEL         Reviewed

 

                    2014 12:00 AM    B12 Injection, Up to 1000 Mcg NDC#2530-8999-34    Reviewed

 

                    10/19/2014 12:00 AM    MAMMOGRAM SCREENING    Reviewed

 

                    10/19/2014 12:00 AM    Screening mammography, bilateral    Reviewed

 

                    10/16/2014 12:00 AM    B12 Injection, Up to 1000 Mcg NDC#9530-2286-30    Reviewed

 

                    10/16/2014 12:00 AM    COMPLETE CBC W/AUTO DIFF WBC    Reviewed

 

                    10/16/2014 12:00 AM    COMPREHEN METABOLIC PANEL    Reviewed

 

                    10/16/2014 12:00 AM    IMMUNOASSAY TUMOR     Reviewed

 

                    10/16/2014 12:00 AM    LIPID PANEL         Reviewed

 

                    10/16/2014 12:00 AM    ASSAY OF LITHIUM    Reviewed

 

                    10/16/2014 12:00 AM    MAMMOGRAM SCREENING    Reviewed

 

                    2011 12:00 AM    ASSAY OF PARATHORMONE    Reviewed

 

                    2011 12:00 AM    VITAMIN D 25 HYDROXY    Reviewed

 

                    2011 12:00 AM    ASSAY OF LITHIUM    Reviewed

 

                    2011 12:00 AM    METABOLIC PANEL TOTAL CA    Reviewed

 

                    2011 12:00 AM    CT HEAD/BRAIN W/O & W/DYE    Reviewed

 

                    3/23/2015 12:00 AM    PNEUMOCOCCAL VACC 13 GLENDY IM    Reviewed

 

                    3/23/2015 12:00 AM    Vitamin B12 injection    Reviewed

 

                    2011 12:00 AM    ASSAY OF LITHIUM    Reviewed

 

                    2011 12:00 AM    B12 Injection Ndc#[address]-09  [place]    Reviewed

 

                    2015 12:00 AM    THER/PROPH/DIAG INJ SC/IM    Reviewed

 

                    2015 12:00 AM    B12 Injection, Up to 1000 Mcg NDC#4064-9486-72    Reviewed

 

                    2015 12:00 AM    COMPLETE CBC W/AUTO DIFF WBC    Reviewed

 

                    2015 12:00 AM    COMPREHEN METABOLIC PANEL    Reviewed

 

                    2015 12:00 AM    LIPID PANEL         Reviewed

 

                    2015 12:00 AM    ASSAY OF LITHIUM    Reviewed

 

                    2011 12:00 AM    VIT D 1 25-DIHYDROXY    Reviewed

 

                    2011 12:00 AM    VITAMIN B-12        Reviewed

 

                    2015 12:00 AM    B12 Injection, Up to 1000 Mcg NDC#3654-6958-11    Reviewed

 

                    2015 12:00 AM    THER/PROPH/DIAG INJ SC/IM    Reviewed

 

                    2015 12:00 AM    B12 Injection, Up to 1000 Mcg NDC#4791-0876-45    Reviewed

 

                    2011 12:00 AM    THER/PROPH/DIAG INJ SC/IM    Reviewed

 

                    2011 12:00 AM    B12 Injection ([facility]) Ndc#0853-5400-40    Reviewed

 

                    2015 12:00 AM    THER/PROPH/DIAG INJ SC/IM    Reviewed

 

                    2015 12:00 AM    B12 Injection, Up to 1000 Mcg NDC#2912-7276-02    Reviewed







Results Summary







                          Date and Description      Results

 

                          2004 12:00 AM        Colonoscopy-Women and Men over 50 Normal 

 

                          2008 12:00 AM         Pap Smear Declined 

 

                          10/7/2009 12:00 AM        Cholest Cry Stone Ql .0 %LDLc SerPl-mCnc 123.0 mg/dLHDLc

 SerPl-mCnc 34.0 mg/dLTrigl SerPl-mCnc 190.0 mg/dLGlucose SerPl-mCnc 78.0 mg/dL

 

                          2009 12:00 AM        Mammogram -Women over 40 Normal HIV1+2 Ab Ser Ql no risk 

 

                          2010 8:47 AM         Dexa Bone Scan Refused Aspirin reccommended Contraindication 



 

                          2010 8:48 AM         Depression Done 

 

                          2010 12:00 AM         Foot Exam-Diabetic Done 

 

                          2010 12:00 AM         Cholest Cry Stone Ql .0 %LDLc SerPl-mCnc 126.0 mg/dLGlucose

 SerPl-mCnc 102.0 mg/dL

 

                          2010 8:45 AM          TRIGLYCERIDES 122.0 mg/dLCHOLESTEROL 186.0 mg/dLHDL 36.0 mg/dLTOT

 CHOL/HDL 5.2 LDL (CALC) 126.0 mg/dLGLUCOSE 102.0 mg/dLSODIUM 143.0 
mmol/LPOTASSIUM 3.70 mmol/LCHLORIDE 111.0 mmol/LCO2 23.0 mmol/LBUN 10.0 
mg/dLCREATININE 0.80 mg/dLSGOT/AST 12.0 IU/LSGPT/ALT 11.0 IU/LALK PHOS 65.0 
IU/LTOTAL PROTEIN 7.20 g/dLALBUMIN 3.90 g/dLTOTAL BILI 0.50 mg/dLCALCIUM 10.20 
mg/dLAGE 59 GFR NonAA 73 GFR AA 88 eGFR >60 mL/min/1.73 m2eGFR AA* >60 WBC 5.7 
RBC 3.26 HGB 10.60 g/dLHCT 31.70 %MCV 97.0 fLMCH 32.50 pgMCHC 33.40 g/dLRDW SD 
47 RDW CV 13.30 %MPV 9.70 fLPLT 287 NRBC# 0.00 NRBC% 0.0 %NEUT 62.90 %%LYMP 
21.80 %%MONO 9.90 %%EOS 5.0 %%BASO 0.40 %#NEUT 3.56 #LYMP 1.23 #MONO 0.56 #EOS 
0.28 #BASO 0.02 MANUAL DIFF NOT IND 

 

                          2010 12:00 AM        Glucose SerPl-mCnc 96.0 mg/dLCholest Cry Stone Ql .0 %LDLc

 SerPl-mCnc 146.0 mg/dL

 

                          2010 8:26 AM         TRIGLYCERIDES 106.0 mg/dLCHOLESTEROL 199.0 mg/dLHDL 32.0 mg/dLTOT

 CHOL/HDL 6.2 LDL (CALC) 146.0 mg/dLGLUCOSE 96.0 mg/dLSODIUM 143.0 
mmol/LPOTASSIUM 4.0 mmol/LCHLORIDE 113.0 mmol/LCO2 24.0 mmol/LBUN 13.0 
mg/dLCREATININE 1.0 mg/dLSGOT/AST 11.0 IU/LSGPT/ALT 6.0 IU/LALK PHOS 56.0 
IU/LTOTAL PROTEIN 6.60 g/dLALBUMIN 3.80 g/dLTOTAL BILI 0.50 mg/dLCALCIUM 9.30 
mg/dLAGE 59 GFR NonAA 57 GFR AA 69 eGFR 57 eGFR AA* >60 

 

                          10/6/2010 12:00 AM        Cholest Cry Stone Ql .0 %LDLc SerPl-mCnc 111.0 mg/dLGlucose

 SerPl-mCnc 81.0 mg/dL

 

                          10/6/2010 2:45 PM         TRIGLYCERIDES 123.0 mg/dLCHOLESTEROL 178.0 mg/dLHDL 42.0 mg/dLTOT

 CHOL/HDL 4.2 LDL (CALC) 111.0 mg/dLGLUCOSE 81.0 mg/dLSODIUM 139.0 
mmol/LPOTASSIUM 4.10 mmol/LCHLORIDE 106.0 mmol/LCO2 24.0 mmol/LBUN 13.0 
mg/dLCREATININE 0.90 mg/dLSGOT/AST 13.0 IU/LSGPT/ALT 11.0 IU/LALK PHOS 61.0 
IU/LTOTAL PROTEIN 7.10 g/dLALBUMIN 3.90 g/dLTOTAL BILI 0.30 mg/dLCALCIUM 9.30 
mg/dLAGE 60 GFR NonAA 64 GFR AA 78 eGFR >60 mL/min/1.73 m2eGFR AA* >60 WBC 6.9 
RBC 3.59 HGB 11.50 g/dLHCT 35.30 %MCV 98.0 fLMCH 32.0 pgMCHC 32.60 g/dLRDW SD 46
 RDW CV 12.90 %MPV 9.90 fLPLT 311 NRBC# 0.00 NRBC% 0.0 %NEUT 64.90 %%LYMP 22.50 
%%MONO 7.20 %%EOS 5.10 %%BASO 0.30 %#NEUT 4.45 #LYMP 1.54 #MONO 0.49 #EOS 0.35 
#BASO 0.02 MANUAL DIFF NOT IND 

 

                          2011 12:00 AM         Mammogram -Women over 40 Ordered 

 

                          2011 10:25 AM        TRIGLYCERIDES 111.0 mg/dLCHOLESTEROL 195.0 mg/dLHDL 43.0 mg/dLTOT

 CHOL/HDL 4.5 LDL (CALC) 130.0 mg/dLWBC 5.3 RBC 3.76 HGB 12.0 g/dLHCT 37.80 %MCV
 101.0 fLMCH 31.90 pgMCHC 31.70 g/dLRDW SD 47 RDW CV 13.0 %MPV 9.70 fLPLT 259 
NRBC# 0.00 NRBC% 0.0 %NEUT 69.0 %%LYMP 17.60 %%MONO 8.30 %%EOS 4.70 %%BASO 0.40 
%#NEUT 3.63 #LYMP 0.93 #MONO 0.44 #EOS 0.25 #BASO 0.02 MANUAL DIFF NOT IND 
GLUCOSE 102.0 mg/dLSODIUM 146.0 mmol/LPOTASSIUM 4.20 mmol/LCHLORIDE 113.0 
mmol/LCO2 23.0 mmol/LBUN 15.0 mg/dLCREATININE 1.0 mg/dLSGOT/AST 12.0 
IU/LSGPT/ALT 17.0 IU/LALK PHOS 60.0 IU/LTOTAL PROTEIN 6.90 g/dLALBUMIN 4.20 
g/dLTOTAL BILI 0.40 mg/dLCALCIUM 9.70 mg/dLAGE 60 GFR NonAA 57 GFR AA 69 eGFR 57
 eGFR AA* >60 

 

                          2011 11:49 AM        Cholest Cry Stone Ql .0 %LDLc SerPl-mCnc 130.0 mg/dLHDLc

 SerPl-mCnc 43.0 mg/dLTrigl SerPl-mCnc 111.0 mg/dLGlucose SerPl-mCnc 102.0 mg/dL

 

                          2011 11:52 AM        Pap Smear Declined 

 

                          2011 11:28 AM        Lithium 2.080 mmol/LGLUCOSE 102.0 mg/dLSODIUM 135.0 mmol/LPOTASSIUM

 3.90 mmol/LCHLORIDE 106.0 mmol/LCO2 21.0 mmol/LBUN 12.0 mg/dLCREATININE 1.30 
mg/dLCALCIUM 10.70 mg/dLAGE 60 GFR NonAA 42 GFR AA 51 eGFR 42 eGFR AA* 51 

 

                          2011 8:58 AM          Lithium 0.690 mmol/L

 

                          2011 2:38 PM         VITAMIN B12 3483.0 pg/mL

 

                          2013 3:35 PM          WBC 5.1 RBC 3.73 HGB 11.70 g/dLHCT 36.40 %MCV 98.0 fLMCH 31.40

 pgMCHC 32.10 g/dLRDW SD 47 RDW CV 13.10 %MPV 9.80 fLPLT 224 NRBC# 0.00 NRBC% 
0.0 %NEUT 66.80 %%LYMP 19.10 %%MONO 9.0 %%EOS 4.90 %%BASO 0.20 %#NEUT 3.42 #LYMP
 0.98 #MONO 0.46 #EOS 0.25 #BASO 0.01 MANUAL DIFF NOT IND GLUCOSE 88.0 
mg/dLSODIUM 141.0 mmol/LPOTASSIUM 4.10 mmol/LCHLORIDE 110.0 mmol/LCO2 22.0 
mmol/LBUN 22.0 mg/dLCREATININE 1.10 mg/dLCALCIUM 9.80 mg/dLAGE 62 GFR NonAA 50 
GFR AA 61 eGFR 50 eGFR AA* 60 Lithium 0.760 mmol/L

 

                          2013 11:02 AM        TRIGLYCERIDES 106.0 mg/dLCHOLESTEROL 181.0 mg/dLHDL 46.0 mg/dLTOT

 CHOL/HDL 3.9 LDL (CALC) 114.0 mg/dLVITAMIN D 41.10 ng/mL

 

                          10/17/2014 10:10 AM       WBC 5.0 RBC 3.66 HGB 11.60 g/dLHCT 36.80 %.0 fLMCH 31.70

 pgMCHC 31.50 g/dLRDW SD 50 RDW CV 13.50 %MPV 10.10 fLPLT 209 NRBC# 0.00 NRBC% 
0.0 %NEUT 69.20 %%LYMP 21.0 %%MONO 6.40 %%EOS 3.20 %%BASO 0.20 %#NEUT 3.46 #LYMP
 1.05 #MONO 0.32 #EOS 0.16 #BASO 0.01 MANUAL DIFF NOT IND GLUCOSE 100.0 
mg/dLSODIUM 148.0 mmol/LPOTASSIUM 3.90 mmol/LCHLORIDE 114.0 mmol/LCO2 26.0 
mmol/LBUN 12.0 mg/dLCREATININE 1.20 mg/dLSGOT/AST 9.0 IU/LSGPT/ALT <6 IU/LALK 
PHOS 82.0 IU/LTOTAL PROTEIN 6.90 g/dLALBUMIN 4.0 g/dLTOTAL BILI 0.40 
mg/dLCALCIUM 10.50 mg/dLAGE 64 GFR NonAA 45 GFR AA 55 eGFR 45 eGFR AA* 55 
TRIGLYCERIDES 96.0 mg/dLCHOLESTEROL 155.0 mg/dLHDL 38.0 mg/dLTOT CHOL/HDL 4.1 
LDL (CALC) 98.0 mg/dLLithium 0.850 mmol/LCancer Antigen (CA) 125 8.30 U/mL

 

                          2015 10:25 AM        Lithium 0.790 mmol/LWBC 4.8 RBC 3.44 HGB 11.0 g/dLHCT 35.20 

%.0 fLMCH 32.0 pgMCHC 31.30 g/dLRDW SD 53 RDW CV 14.0 %MPV 9.30 fLPLT 210
 NRBC# 0.00 NRBC% 0.0 %NEUT 70.80 %%LYMP 17.20 %%MONO 8.10 %%EOS 3.50 %%BASO 
0.40 %#NEUT 3.41 #LYMP 0.83 #MONO 0.39 #EOS 0.17 #BASO 0.02 MANUAL DIFF NOT IND 
TRIGLYCERIDES 107.0 mg/dLCHOLESTEROL 174.0 mg/dLHDL 43.0 mg/dLTOT CHOL/HDL 4.0 
LDL (CALC) 110.0 mg/dLGLUCOSE 90.0 mg/dLSODIUM 145.0 mmol/LPOTASSIUM 3.80 
mmol/LCHLORIDE 115.0 mmol/LCO2 24.0 mmol/LBUN 17.0 mg/dLCREATININE 1.30 
mg/dLSGOT/AST 18.0 IU/LSGPT/ALT 17.0 IU/LALK PHOS 56.0 IU/LTOTAL PROTEIN 6.70 
g/dLALBUMIN 3.90 g/dLTOTAL BILI 0.40 mg/dLCALCIUM 9.80 mg/dLAGE 64 GFR NonAA 41 
GFR AA 50 eGFR 41 eGFR AA* 50 

 

                          2015 8:50 AM        WBC 5.8 RBC 3.29 HGB 10.70 g/dLHCT 34.0 %.0 fLMCH 32.50

 pgMCHC 31.50 g/dLRDW SD 52 RDW CV 13.60 %MPV 9.60 fLPLT 223 NRBC# 0.00 NRBC% 
0.0 %NEUT 69.60 %%LYMP 18.90 %%MONO 8.50 %%EOS 2.80 %%BASO 0.20 %#NEUT 4.03 
#LYMP 1.09 #MONO 0.49 #EOS 0.16 #BASO 0.01 MANUAL DIFF NOT IND Lithium 0.620 
mmol/LGLUCOSE 83.0 mg/dLSODIUM 139.0 mmol/LPOTASSIUM 3.90 mmol/LCHLORIDE 109.0 
mmol/LCO2 22.0 mmol/LBUN 19.0 mg/dLCREATININE 1.40 mg/dLSGOT/AST 19.0 
IU/LSGPT/ALT 21.0 IU/LALK PHOS 55.0 IU/LTOTAL PROTEIN 6.50 g/dLALBUMIN 3.90 
g/dLTOTAL BILI 0.50 mg/dLCALCIUM 9.60 mg/dLAGE 65 GFR NonAA 38 GFR AA 46 eGFR 38
 eGFR AA* 46 TRIGLYCERIDES 121.0 mg/dLCHOLESTEROL 192.0 mg/dLHDL 51.0 mg/dLTOT 
CHOL/HDL 3.8 .0 mg/dLFREE T4 0.79 TSH 1.210 uIU/mLHemoglobin A1c 5.40 
%Estim. Avg Glu (eAG) 108 

 

                          3/15/2016 8:08 AM         VITAMIN B12 696.0 pg/mL

 

                          3/23/2016 8:26 AM         WBC 7.0 RBC 3.61 HGB 11.80 g/dLHCT 37.70 %.0 fLMCH 32.70

 pgMCHC 31.30 g/dLRDW SD 49 RDW CV 12.50 %MPV 10.0 fLPLT 207 NRBC# 0.00 NRBC% 
0.0 %NEUT 73.60 %%LYMP 16.40 %%MONO 6.60 %%EOS 3.0 %%BASO 0.30 %#NEUT 5.15 #LYMP
 1.15 #MONO 0.46 #EOS 0.21 #BASO 0.02 MANUAL DIFF NOT IND Lithium 0.940 
mmol/LGLUCOSE 108.0 mg/dLSODIUM 143.0 mmol/LPOTASSIUM 4.30 mmol/LCHLORIDE 110.0 
mmol/LCO2 27.0 mmol/LBUN 16.0 mg/dLCREATININE 1.60 mg/dLSGOT/AST 13.0 
IU/LSGPT/ALT 7.0 IU/LALK PHOS 71.0 IU/LTOTAL PROTEIN 6.80 g/dLALBUMIN 4.0 
g/dLTOTAL BILI 0.20 mg/dLCALCIUM 10.40 mg/dLAGE 65 GFR NonAA 32 GFR AA 39 eGFR 
32 eGFR AA* 39 TRIGLYCERIDES 113.0 mg/dLCHOLESTEROL 169.0 mg/dLHDL 42.0 mg/dLTOT
 CHOL/HDL 4.0 LDL (CALC) 104.0 mg/dLFREE T4 0.86 TSH 2.20 uIU/mLHemoglobin A1c 
5.20 %Estim. Avg Glu (eAG) 103 

 

                          3/25/2016 9:17 AM         COLOR YELLOW APPEARANCE CLEAR SPEC GRAV 1.010 pH 7.0 PROTEIN 

NEGATIVE GLUCOSE NEGATIVE mg/dLKETONE NEGATIVE BILIRUBIN NEGATIVE BLOOD NEGATIVE
 NITRITE NEGATIVE [place] SCREEN SMALL MICRO IND? SEE BELOW WBC/HPF 0-5 RBC/HPF 
NEGATIVE CASTS/LPF NEGATIVE /LPFCRYSTALS NEGATIVE MUCOUS THRDS NEGATIVE BACTERIA
 NEGATIVE EPITH CELLS FEW SQUAMOUS /HPFTRICHOMONAS NEGATIVE YEAST NEGATIVE 

 

                          2016 6:00 AM        GLUCOSE 91.0 mg/dLSODIUM 143.0 mmol/LPOTASSIUM 3.60 mmol/LCHLORIDE

 112.0 mmol/LCO2 23.0 mmol/LBUN 22.0 mg/dLCREATININE 1.20 mg/dLSGOT/AST 15.0 
IU/LSGPT/ALT 12.0 IU/LALK PHOS 61.0 IU/LTOTAL PROTEIN 5.40 g/dLALBUMIN 3.10 
g/dLTOTAL BILI 0.40 mg/dLCALCIUM 8.40 mg/dLAGE 66 GFR NonAA 45 GFR AA 55 eGFR 45
 eGFR AA* 55 WBC 3.0 RBC 3.05 HGB 9.80 g/dLHCT 32.10 %.0 fLMCH 32.10 
pgMCHC 30.50 g/dLRDW SD 54 RDW CV 14.20 %MPV 10.10 fLPLT 170 NRBC# 0.00 NRBC% 
0.0 %NEUT 50.70 %%LYMP 32.60 %%MONO 10.50 %%EOS 5.90 %%BASO 0.30 %#NEUT 1.54 
#LYMP 0.99 #MONO 0.32 #EOS 0.18 #BASO 0.01 MANUAL DIFF NOT IND 

 

                          2016 2:09 PM        COLOR YELLOW APPEARANCE CLEAR SPEC GRAV 1.010 pH 5.0 PROTEIN

 30 GLUCOSE NEGATIVE mg/dLKETONE NEGATIVE BILIRUBIN NEGATIVE BLOOD LARGE NITRITE
 NEGATIVE [place] SCREEN MODERATE MICRO INDICATED? SEE BELOW WBC/HPF  RBC/HPF
 20-50 CASTS/LPF NEGATIVE /LPFCRYSTALS NEGATIVE MUCOUS THRDS NEGATIVE BACTERIA 
NEGATIVE EPITH CELLS FEW SQUAMOUS /HPFTRICHOMONAS NEGATIVE YEAST NEGATIVE CULT 
SET UP? YES 

 

                          1/3/2017 4:08 PM          COLOR YELLOW APPEARANCE HAZY SPEC GRAV 1.015 pH 6.0 PROTEIN 30

 GLUCOSE NEGATIVE mg/dLKETONE NEGATIVE BILIRUBIN NEGATIVE BLOOD MODERATE NITRITE
 NEGATIVE [place] SCREEN LARGE MICRO INDICATED? SEE BELOW WBC/-200 RBC/HPF 
5-10 CASTS/LPF NEGATIVE /LPFCRYSTALS NEGATIVE MUCOUS THRDS NEGATIVE BACTERIA 
NEGATIVE EPITH CELLS 1+ SQUAMOUS /HPFTRICHOMONAS NEGATIVE YEAST NEGATIVE CULT 
SET UP? YES 

 

                          2017 4:24 PM         CREATININE 1.50 mg/dLAGE 66 GFR NonAA 35 GFR AA 42 eGFR 35 eGFR

 AA* 42 VITAMIN B12 1324.0 pg/mL

 

                          2017 11:30 AM         GLUCOSE 93.0 mg/dLSODIUM 143.0 mmol/LPOTASSIUM 3.10 mmol/LCHLORIDE

 101.0 mmol/LCO2 29.0 mmol/LBUN 26.0 mg/dLCREATININE 1.50 mg/dLSGOT/AST 23.0 
IU/LSGPT/ALT 13.0 IU/LALK PHOS 66.0 IU/LTOTAL PROTEIN 7.70 g/dLALBUMIN 4.30 
g/dLTOTAL BILI 0.40 mg/dLCALCIUM 10.30 mg/dLAGE 66 GFR NonAA 35 GFR AA 42 eGFR 
35 eGFR AA* 42 TRIGLYCERIDES 147.0 mg/dLCHOLESTEROL 184.0 mg/dLHDL 44.0 mg/dLTOT
 CHOL/HDL 4.2 .0 mg/dLWBC 5.4 RBC 3.98 HGB 12.90 g/dLHCT 40.20 %.0
 fLMCH 32.40 pgMCHC 32.10 g/dLRDW SD 50 RDW CV 13.50 %MPV 9.30 fLPLT 210 NRBC# 
0.00 NRBC% 0.0 %NEUT 54.20 %%LYMP 30.70 %%MONO 9.10 %%EOS 5.20 %%BASO 0.40 
%#NEUT 2.94 #LYMP 1.66 #MONO 0.49 #EOS 0.28 #BASO 0.02 MANUAL DIFF NOT IND FREE 
T4 1.09 COLOR YELLOW APPEARANCE CLEAR SPEC GRAV <=1.005 pH 5.5 PROTEIN NEGATIVE 
GLUCOSE NEGATIVE mg/dLKETONE NEGATIVE BILIRUBIN NEGATIVE BLOOD NEGATIVE NITRITE 
NEGATIVE [place] SCREEN LARGE MICRO INDICATED? SEE BELOW WBC/HPF 10-20 RBC/HPF 0-5 
CASTS/LPF NEGATIVE /LPFCRYSTALS NEGATIVE MUCOUS THRDS NEGATIVE BACTERIA FEW 
EPITH CELLS 1+ SQUAMOUS /HPFTRICHOMONAS NEGATIVE YEAST NEGATIVE CULT SET UP? YES
 TSH 1.820 uIU/mL

 

                          2017 2:45 PM         COLOR YELLOW APPEARANCE CLEAR SPEC GRAV <=1.005 pH 6.0 PROTEIN

 NEGATIVE GLUCOSE NEGATIVE mg/dLKETONE NEGATIVE BILIRUBIN NEGATIVE BLOOD TRACE-
INTACT NITRITE NEGATIVE [place] SCREEN SMALL MICRO INDICATED? SEE BELOW WBC/HPF 0-5
 RBC/HPF NEGATIVE CASTS/LPF NEGATIVE /LPFCRYSTALS NEGATIVE MUCOUS THRDS NEGATIVE
 BACTERIA FEW EPITH CELLS FEW SQUAMOUS /HPFTRICHOMONAS NEGATIVE YEAST NEGATIVE 
CULT SET UP? YES 

 

                          2017 11:22 AM        COLOR YELLOW APPEARANCE CLEAR SPEC GRAV <=1.005 pH 6.5 PROTEIN

 NEGATIVE GLUCOSE NEGATIVE mg/dLKETONE NEGATIVE BILIRUBIN NEGATIVE BLOOD 
NEGATIVE NITRITE NEGATIVE LEUK SCREEN NEGATIVE MICRO INDICATED? NOT INDICATED 

 

                          2017 2:18 PM         Clarity Ur cloudy Color Ur dark yellow Glucose Ur-sCnc negative

 Bilirub Ur Ql Strip negative Ketones Ur Ql Strip negative Sp Gr Ur Qn 1.010 Hgb
 Ur Ql Strip trace-intact pH Ur-LsCnc 6.5 Prot Ur Ql Strip trace Urobilinogen 
Ur-mCnc 0.2 Nitrite Ur Ql Strip negative WBC Est Ur Ql Strip large 







History Of Immunizations







       Name    Date Admin    Mfg Name    Mfg Code    Trade Name    Lot#    Route    Inj    Vis Given    Vis

 Pub                                    CVX

 

        Influenza    2008    Not Entered    NE      Not Entered            Not Entered    Not Entered

                    1            999

 

        X       12/19/2008    [employer]    MSD     Pneumovax 23            Intramuscular    Not Entered

                    1            999

 

           Influenza    10/15/2010    [employer]    NOV        Fluvirin > 12 Years    

[id number]     Intramuscular    Left Deltoid    10/15/2010    2009    999

 

          X         3/23/2015    [facility]    [facility]       Prevnar 13    [id number]    Intramuscular

                Right Gluteous Medius    3/23/2015       2013       109







History of Past Illness







                    Name                Date of Onset       Comments

 

                    Peritoneal Neoplasm, Malignant                         

 

                    Hyperlipidemia                           

 

                    Bipolar disorder, unspecified                         

 

                    Artificial opening status; colostomy                         

 

                    B12 deficiency                           

 

                    Anemia, Pernicious                         

 

                    Arthritis unspecified                         

 

                    cervical cancer                          

 

                    Artificial opening status; colostomy    2010  1:10PM     

 

                    Bipolar disorder, unspecified    2010  1:10PM     

 

                    Hyperlipidemia      2010  1:10PM     

 

                    Anemia, Pernicious    2010  1:10PM     

 

                    Postoperative Follow-Up    2010  1:55PM     

 

                    Postoperative Follow-Up    Mar  8 2010 10:57AM     

 

                    Artificial opening status; colostomy    Mar  8 2010  1:19PM     

 

                    Peritoneal Neoplasm, Malignant    Mar  8 2010  1:19PM     

 

                    Artificial opening status; colostomy    2010  1:40PM     

 

                    Hyperlipidemia      2010  1:40PM     

 

                    Anemia, Pernicious    2010  1:40PM     

 

                    Peritoneal Neoplasm, Malignant    2010  1:40PM     

 

                    Arthritis unspecified    2010  1:40PM     

 

                    Anemia of Chronic Illness    2010  1:40PM     

 

                    Tinea corporis      2010  3:17PM     

 

                    Bipolar disorder, unspecified    2010  1:33PM     

 

                    Hyperlipidemia      2010  1:33PM     

 

                    Anemia, Pernicious    2010  1:33PM     

 

                    Peritoneal Neoplasm, Malignant    2010  1:33PM     

 

                    B12 deficiency      2010  1:33PM     

 

                    Ethmoidal Sinusitis, Acute    Sep 21 2010  3:53PM     

 

                    Wheezing            Sep 21 2010  3:53PM     

 

                    Flu                 Oct 15 2010  1:40PM     

 

                    Bipolar disorder, unspecified    Oct 15 2010  1:42PM     

 

                    Hyperlipidemia      Oct 15 2010  1:42PM     

 

                    Anemia, Pernicious    Oct 15 2010  1:42PM     

 

                    Peritoneal Neoplasm, Malignant    Oct 15 2010  1:42PM     

 

                    Bipolar disorder, unspecified    2011 12:01PM     

 

                    Hyperlipidemia      2011 12:01PM     

 

                    Anemia, Pernicious    2011 12:01PM     

 

                    Peritoneal Neoplasm, Malignant    2011 12:01PM     

 

                    Bipolar disorder, unspecified    Apr 15 2011 10:55AM     

 

                    Major Depression    2011 10:11AM     

 

                    Bipolar Disorder    2011 10:11AM     

 

                    Cancer              May 10 2011  4:16PM     

 

                    Major Depression    May 10 2011  3:16PM     

 

                    Bipolar Disorder    May 10 2011  3:16PM     

 

                    Hypercalcemia       May 23 2011  2:47PM     

 

                    Bipolar disorder, unspecified    May 23 2011  2:47PM     

 

                    Colon Cancer, Personal History    May 23 2011  2:47PM     

 

                    Bipolar Disorder    May 31 2011  4:39PM     

 

                    Depressive Disorder    2011 10:01AM     

 

                    Vitamin B12 deficiency    2011 10:01AM     

 

                    Vitamin D Deficiency    2011  5:07PM     

 

                    Anemia, Vitamin B12 Deficiency    2011  5:07PM     

 

                    B12 deficiency      2011  3:56PM     

 

                    Routine gynecological examination    Aug  4 2011  9:08AM     

 

                    Screening Examination for Breast Cancer    Aug  4 2011  9:08AM     

 

                    Tinea Corporis      Aug  4 2011  9:08AM     

 

                    Depressive Disorder    Sep 23 2011  8:47AM     

 

                    Contact Dermatitis    Sep 23 2011  8:47AM     

 

                    Anemia, Pernicious    Sep 23 2011  8:47AM     

 

                    B12 deficiency      Sep 23 2011  8:47AM     

 

                    B12 deficiency      Sep 27 2011  2:58PM     

 

                    B12 deficiency      Oct 20 2011  2:34PM     

 

                    Flu                 Dec  9 2011  3:16PM     

 

                    B12 deficiency      Dec  9 2011  3:17PM     

 

                    B12 deficiency      2012  4:52PM     

 

                    B12 deficiency      2012 11:10AM     

 

                    B12 deficiency      2012  3:37PM     

 

                    B12 deficiency      May  3 2012  4:10PM     

 

                    B12 deficiency      2012  2:54PM     

 

                    B12 deficiency      2012 11:23AM     

 

                    B12 deficiency      Aug  9 2012  2:08PM     

 

                    B12 deficiency      Sep  6 2012  4:36PM     

 

                    B12 deficiency      Oct 16 2012 10:23AM     

 

                    Flu                 Feb  4   3:11PM     

 

                    Bipolar disorder, unspecified    Feb  2013  2:48PM     

 

                    Anemia, Pernicious    Feb  4   2:48PM     

 

                    B12 deficiency      Feb  4   2:48PM     

 

                    Extrapyramidal abnormal movement disorder    Feb  4   2:48PM     

 

                    B12 deficiency      Apr  3 2013 12:03PM     

 

                    Bipolar disorder, unspecified    May  7 2013  1:31PM     

 

                    Anemia, Pernicious    May  7 2013  1:31PM     

 

                    B12 deficiency      May  7 2013  1:31PM     

 

                    Extrapyramidal abnormal movement disorder    May  7 2013  1:31PM     

 

                    B12 deficiency      2013  3:42PM     

 

                    B12 deficiency      2013  1:31PM     

 

                    Hyperlipidemia      Aug  7 2013 10:37AM     

 

                    Vitamin D Deficiency    Aug  7 2013 10:37AM     

 

                    Bipolar disorder, unspecified    Aug  7 2013 10:37AM     

 

                    Anemia, Pernicious    Aug  7 2013 10:37AM     

 

                    B12 deficiency      Aug  7 2013 10:37AM     

 

                    B12 deficiency      Sep 25 2013 11:15AM     

 

                    B12 deficiency      Dec 11 2013  3:16PM     

 

                    B12 deficiency      Mar  6 2014  1:48PM     

 

                    B12 deficiency      May 21 2014  3:17PM     

 

                    Screening Examination for Breast Cancer    2014  3:23PM     

 

                    Periumbilical abdominal pain    2014  3:23PM     

 

                    B12 deficiency      Jul 10 2014  2:52PM     

 

                    Anemia, Vitamin B12 Deficiency    Aug 13 2014  4:50PM     

 

                    Bipolar disorder    Oct 16 2014 11:13AM     

 

                    Hyperlipidemia      Oct 16 2014 11:13AM     

 

                    Anemia, Pernicious    Oct 16 2014 11:13AM     

 

                    Peritoneal Neoplasm, Malignant    Oct 16 2014 11:13AM     

 

                    Screening breast examination    Oct 16 2014 11:13AM     

 

                    Weight loss         Oct 16 2014 11:13AM     

 

                    Anemia, Pernicious    Mar 23 2015  2:57PM     

 

                    B12 deficiency      Mar 23 2015  2:57PM     

 

                    Need for Prevnar vaccine    Mar 23 2015  2:57PM     

 

                    Bipolar disorder    Mar 23 2015  2:57PM     

 

                    Hyperlipidemia      Mar 23 2015  2:57PM     

 

                    Anemia, Pernicious    Mar 23 2015  2:57PM     

 

                    Peritoneal Neoplasm, Malignant    Mar 23 2015  2:57PM     

 

                    B12 deficiency      May  4 2015  4:48PM     

 

                    Hyperlipidemia      May 13 2015  9:56AM     

 

                    Anemia              May 13 2015  9:56AM     

 

                    Bipolar disorder    May 13 2015  9:56AM     

 

                    Bipolar disorder    May 14 2015  3:27PM     

 

                    Hyperlipidemia      May 14 2015  3:27PM     

 

                    Anemia, Pernicious    May 14 2015  3:27PM     

 

                    Peritoneal Neoplasm, Malignant    May 14 2015  3:27PM     

 

                    B12 deficiency      2015  2:20PM     

 

                    B12 deficiency      2015 11:34AM     

 

                    B12 deficiency      Aug 18 2015  9:06AM     

 

                    Tinea Corporis      Sep 18 2015  8:54AM     

 

                    B12 deficiency      Sep 18 2015  8:54AM     

 

                    B12 deficiency      2015 10:28AM     

 

                    Herpes zoster without complication    Dec  3 2015  9:52AM     

 

                    B12 deficiency      Dec 23 2015 11:21AM     

 

                    B12 deficiency      2016  4:51PM     

 

                    Vitamin B 12 deficiency    Mar 14 2016  5:35PM     

 

                    B12 deficiency      Mar 15 2016 12:14PM     

 

                    B12 deficiency      May  5 2016 11:30AM     

 

                    Edema               May  5 2016 11:30AM     

 

                    Dermatitis          May  5 2016 11:30AM     

 

                    Edema               May 17 2016  8:38AM     

 

                    Shortness of breath    May 17 2016  8:38AM     

 

                    Bilateral edema of lower extremity    2016  2:06PM     

 

                    B12 deficiency      2016  2:06PM     

 

                    B12 deficiency      2016 11:50AM     

 

                    B12 deficiency      2016 11:20AM     

 

                    Diarrhea            Aug  2 2016  3:13PM     

 

                    B12 deficiency      Aug 24 2016 11:10AM     

 

                    Encounter for screening mammogram for breast cancer    Aug 24 2016 11:44AM     

 

                    B12 deficiency      Sep 28 2016  2:35PM     

 

                    B12 deficiency      Dec 15 2016  2:02PM     

 

                    Dysuria             Dec 29 2016 12:14PM     

 

                    Hematuria           Fidencio  3 2017  1:33PM     

 

                    Constipation by delayed colonic transit    2017  1:52PM     

 

                    Ileus               2017  1:52PM     

 

                    UTI (urinary tract infection)    Fidencio 15 2017  3:39PM     

 

                    Acute cystitis with hematuria    2017 11:07AM     

 

                    B12 deficiency      2017 11:07AM     

 

                    B12 deficiency      2017 11:40AM     

 

                    B12 deficiency      2017  4:07PM     

 

                    Slurred speech      2017  3:07PM     

 

                    Vitamin B12 deficiency    2017  3:07PM     

 

                    Dysphagia, unspecified type    2017  3:07PM     

 

                    Hyperlipidemia      2017  3:07PM     

 

                    Dysuria             2017 12:01PM     

 

                    B12 deficiency      2017  2:08PM     

 

                    Dysuria             2017 10:58AM     

 

                    Hematuria           May 22 2017  1:36PM     

 

                    Depressive Disorder    May 22 2017  1:36PM     

 

                    Constipation        May 22 2017  1:36PM     

 

                    Fatigue             May 22 2017  1:36PM     

 

                    Urinary tract infection    May 30 2017  9:36AM     







Payers







           Insurance Name    Company Name    Plan Name    Plan Number    Policy Number    Policy Group

 Number                                 Start Date

 

                    Medicare RHC Medicare RHC              [id number]              N/A

 

                          [employer]    [employer]                 [id number]

                                                    

 

                    Medicare Part A    Medicare - Lab/Xray              [id number]              2006

 

                    Medicare Part B    Medic[employer] [place]              [id number]              2006

 

                          [facility] [employer] [facility]    [employer]                 50 percent

                                                    2009

 

                    [facility]              [id number]              N/A

 

                    Medicare Part A    Medicare Part A              [id number]              N/A

 

                    Medicare Part A    Medicare Part A              [id number]              2006









History of Encounters







                    Visit Date          Visit Type          Provider

 

                    2017           Office visit        Bhupinder Aspen DO

 

                    2017           Nurse visit         Bhupinder Aspen DO

 

                    2017           Office visit         

 

                    2017           Office visit        Bhupinder Aspen DO

 

                    2017           Nurse visit         Bhupinder Aspen DO

 

                    2017           Office visit        Radha Ontiveros APRN

 

                    1/15/2017           Office visit        Aj Tapia NP

 

                    2017            Office visit        Devin Masterson MD

 

                    2016          [facility]            Devin Masterson MD

 

                    12/15/2016          Nurse visit         Bhupinder Aspen DO

 

                    2016           Nurse visit         Bret Forte APRN

 

                    2016           Nurse visit         Bhupinder Aspen DO

 

                    2016            Office visit        Bhupinder Aspen DO

 

                    2016           Nurse visit         Bhupinder Aspen DO

 

                    2016           Office visit        Bret Forte APRN

 

                    2016            Office visit        Bhupinder Aspen DO

 

                    3/15/2016           Nurse visit         Bhupinder Aspen DO

 

                    2016            Nurse visit         Bhupinder Aspen DO

 

                    2015          Nurse visit         Bhupinder Aspen DO

 

                    12/3/2015           Office visit        Bhupinder Aspen DO

 

                    2015          Nurse visit         Bhupinder Aspen DO

 

                    2015           Office visit        Bhupinder Aspen DO

 

                    2015           Nurse visit         Bhupinder Aspen DO

 

                    2015            Nurse visit         Bhupinder Aspen DO

 

                    2015            Nurse visit         Bhupinder Aspen DO

 

                    2015           Office visit        Bhupinder Aspen DO

 

                    2015            Nurse visit         Bhupinder Aspen DO

 

                    3/23/2015           Office visit        Bhupinder Aspen DO

 

                    10/16/2014          Office visit        Bhupinder Aspen DO

 

                    2014           Nurse visit         Radha Ontiveros APRN

 

                    7/10/2014           Nurse visit         Bhupinder Aspen DO

 

                    2014           Office visit        Bhupinder Aspen DO

 

                    2014           Nurse visit         Bhupinder Aspen DO

 

                    3/6/2014            Nurse visit         Bhupinder Aspen DO

 

                    2014            [facility]            EARNEST Lopez MD

 

                    2013          Nurse visit         Bhupinder Aspen DO

 

                    2013           Nurse visit         Bhupinder Aspen DO

 

                    2013            Office visit        Bhupinder Aspen DO

 

                    2013            Nurse visit         Bhupinder Aspen DO

 

                    2013            Nurse visit         Bhupinder Aspen DO

 

                    2013            Office visit        Bhupinder Aspen DO

 

                    4/3/2013            Nurse visit         Bhupinder Aspen DO

 

                    2013            Office visit        Bhupinder Aspen DO

 

                    10/16/2012          Nurse visit         Bhupinder Aspen DO

 

                    2012            Nurse visit         Bhupinder Aspen DO

 

                    2012            Voided              Bhupinder Aspen DO

 

                    2012            Nurse visit         Bhupinder Aspen DO

 

                    2012            Nurse visit         Bhupinder Aspen DO

 

                    2012           Nurse visit         Bhupinder Aspen DO

 

                    5/3/2012            Nurse visit         Bhupinder Aspen DO

 

                    2012           Nurse visit         Bhupinder Aspen DO

 

                    2012           Nurse visit         Bhupinder Aspen DO

 

                    2012           Nurse visit         Bhupinder Aspen DO

 

                    2011           Nurse visit         Bhupinder Aspen DO

 

                    10/20/2011          Nurse visit         Bhupinder Aspen DO

 

                    2011           Office visit        Bhupinder Aspen DO

 

                    2011           Nurse visit         Radha GREEN

 

                    2011            Office visit        Bhupinder Aspen DO

 

                    2011           Nurse visit         Bhupinder Aspen DO

 

                    2011            Office visit        Bhupinder Aspen DO

 

                    2011           Office visit        Bhupinder Aspen DO

 

                    5/10/2011           Office visit        Bhupinder Aspen DO

 

                    2011           Office visit        Bhupinder Aspen DO

 

                    4/15/2011           Office visit        Devin Angel DO

 

                    2011           Office visit        Devin Angel DO

 

                    10/15/2010          Office visit        Devin Angel DO

 

                    2010           Office visit        Devin Angel DO

 

                    2010            Office visit        Devin Angel DO

 

                    2010           Office visit        Devin Angel DO

 

                    2010            Office visit        Devin Angel DO

 

                    3/8/2010            Office visit        Devin Masterson MD

 

                    2010            Surgery             Devin Masterson MD

 

                    2010            Office visit        Devin Angel DO

 

                    2010           Surgery             Devin Masterson MD

 

                    2010           Hospital            Devin Masterson MD

 

                    2010           [facility]            Devin Masterson MD

 

                    10/22/2009          Office visit        Devin Angel DO

## 2019-06-26 NOTE — XMS REPORT
MU2 Ambulatory Summary

                             Created on: 2017



Pauline Gan

External Reference #: [id number]

: 1950

Sex: Female



Demographics







                          Address                   [address]

[place]  [id number]

 

                          Home Phone                [phone]

 

                          Preferred Language        English

 

                          Marital Status            Legally 

 

                          Scientologist Affiliation     Unknown

 

                          Race                      White

 

                          Ethnic Group              Not  or 





Author







                          Author                    Pranav Angel

 

                          Organization              [facility]

 

                          Address                   [address]

[place]  [id number]



 

                          Phone                     [phone]







Care Team Providers







                    Care Team Member Name    Role                Phone

 

                    Pranav Angel     PCP                 Unavailable

 

                    Bhupinder Louise    PreferredProvider    Unavailable







Allergies and Adverse Reactions







                    Name                Reaction            Notes

 

                    NO KNOWN DRUG ALLERGIES                         







Plan of Treatment







             Planned Activity    Comments     Planned Date    Planned Time    Plan/Goal

 

             Injection,Subcutaneous/Intramuscul, [facility]                 2017    12:00 AM      







Medications







                                        Active 

 

             Name         Start Date    Estimated Completion Date    SIG          Comments

 

                Latuda 20 mg oral tablet                                    take 1 tablet (20 mg) by oral route once daily with

 food (at least 350 calories)            

 

             pravastatin 40 mg oral tablet    3/30/2015                 TAKE 1 TABLET BY MOUTH DAILY     

 

                Namenda XR 28 mg oral capsule,sprinkle,ER 24hr    2015                       take 1 capsule (28

 mg) by oral route once daily            

 

                Namenda XR 28 mg oral capsule,sprinkle,ER 24hr    2016                       take 1 capsule (28

 mg) by oral route once daily            

 

                potassium chloride 10 mEq oral tablet extended release    2016                       take 1 tablet

 (10 meq) by oral route once daily       

 

             pravastatin 40 mg oral tablet    2016                 TAKE 1 TABLET BY MOUTH DAILY     

 

                Vitamin B-12 1,000 mcg/mL injection solution    2016                       inject 1 milliliter 

(1,000 mcg) by intramuscular route once a month     

 

                potassium chloride 10 mEq oral tablet extended release    2016                      take 1 tablet

 (10 meq) by oral route once daily       

 

                Namenda XR 28 mg oral capsule,sprinkle,ER 24hr    2016                      TAKE 1 CAPSULE BY

 MOUTH EVERY DAY                         

 

                furosemide 40 mg oral tablet    2016                      take 1 tablet (40 mg) by oral route

 once daily                              

 

                mirtazapine 45 mg oral tablet                                    take 1 tablet (45 mg) by oral route once daily

 before bedtime                          

 

             Fish Oil 300-1,000 mg oral capsule                              take 1 capsule by oral route daily     

 

             Vitamin D3 1,000 unit oral tablet                              take 1 tablet by oral route daily     

 

                Calcium 600 600 mg calcium (1,500 mg) oral tablet                                    take 1 tablet by oral route

 daily                                   

 

                Aspirin Low Dose 81 mg oral tablet,delayed release (DR/EC)                                    take 1 tablet 

(81 mg) by oral route once daily         

 

                metoclopramide HCl 10 mg oral tablet    2017                       take 1 tablet by oral route 

2 times a day for 50 days                

 

             furosemide 40 mg oral tablet    2017                 TAKE 1 TABLET BY MOUTH DAILY     

 

                Namenda XR 28 mg oral capsule,sprinkle,ER 24hr    2017                       TAKE 1 CAPSULE BY 

MOUTH EVERY DAY                          

 

                Linzess 72 mcg oral capsule    2017                       take 1 capsule (72 mcg) by oral route

 once daily on an empty stomach at least 30 minutes before 1st meal of the day     



 

             pravastatin 40 mg oral tablet    2017                 TAKE 1 TABLET BY MOUTH DAILY     

 

                metoclopramide HCl 10 mg oral tablet    2017                       TAKE 1 TABLET BY ORAL ROUTE 

2 TIMES A DAY FOR 50 DAYS                

 

                potassium chloride 10 mEq oral tablet extended release    2017                       TAKE 2 TABLETs

 BY MOUTH twice DAILY for one week       

 

                potassium chloride 10 mEq oral tablet extended release    2017                       TAKE 2 TABLETs

 BY MOUTH twice DAILY for one week       

 

             simvastatin 20 mg oral tablet    2017                 TAKE 1 TABLET BY MOUTH AT BEDTIME    

 

 

             famotidine 20 mg oral tablet    2017                 TAKE 1 TABLET BY MOUTH TWICE DAILY    

 

 

             memantine 10 mg oral tablet    2017                 TAKE 1 TABLET BY MOUTH TWICE DAILY     



 

                rivastigmine tartrate 1.5 mg oral capsule    2017                       TAKE 1 CAPSULE BY MOUTH

 TWICE DAILY BEFORE MEALS                









                                         

 

             Name         Start Date    Expiration Date    SIG          Comments

 

             Reglan 10mg    3/29/2010    2010    one ac and hs     

 

                Keflex 500 mg oral capsule    2010       10/1/2010       take 1 capsule (500 mg) by oral

 route every 6 hours for 10 days         

 

                Bactrim -160 mg oral tablet    2011       take 1 tablet by oral route

 every 12 hours for 7 days               

 

                triamcinolone acetonide 0.1 % topical cream    2011      apply a thin

 layer to the affected area(s) by topical route 2 times per day     

 

                sertraline 100 mg oral tablet    4/10/2012       5/10/2012       take 1.5 tablets by oral route

 daily for 30 days                       

 

                ergocalciferol (vitamin D2) 50,000 unit oral capsule    4/15/2013       2013       TAKE

 ONE CAPSULE BY MOUTH ONCE A WEEK        

 

                CYANOCOBALAM 1000MCGINJ 1000 milliliter    2013       INJECT 1ML INTRAMUSCULAR

 ONCE A MONTH                            

 

                pravastatin 40 mg oral tablet    3/25/2014       3/20/2015       TAKE ONE TABLET BY MOUTH EVERY

 DAY                                     

 

                          Zostavax (PF) 19,400 unit/0.65 mL subcutaneous suspension for reconstitution    3/23/2015

                    3/24/2015           inject 0.65 milliliter by subcutaneous route once     

 

                famciclovir 500 mg oral tablet    12/3/2015       12/10/2015      take 1 tablet (500 mg) by

 oral route every 8 hours for 7 days     

 

                furosemide 40 mg oral tablet    2016      take 1 tablet (40 mg) by oral

 route once daily                        

 

                Cipro 500 mg oral tablet    2016       take 1 tablet (500 mg) by oral route

 2 times per day for 5 days              

 

                Bactrim -160 mg oral tablet    2016        take 1 tablet by oral route

 every 12 hours for 7 days               

 

                metoclopramide HCl 10 mg oral tablet    2017       take 1 tablet by oral

 route 2 times a day for 50 days         

 

                Macrobid 100 mg oral capsule    2017       take 1 capsule (100 mg) by oral

 route 2 times per day with food for 7 days     

 

                Augmentin 875-125 mg oral tablet    2017       take 1 tablet by oral route

 every 12 hours for 7 days               

 

                amoxicillin 500 mg oral tablet    2017       take 1 tablet (500 mg) by oral

 route every 12 hours for 7 days         

 

                ciprofloxacin HCl 500 mg oral tablet    2017       take 1 tablet (500 mg)

 by oral route every 12 hours for 5 days     

 

                cefuroxime axetil 500 mg oral tablet    2017        take 1 tablet (500 mg)

 by oral route 2 times per day for 10 days     









                                        Discontinued 

 

             Name         Start Date    Discontinued Date    SIG          Comments

 

                Tylenol 325 mg oral tablet                    2013        take 1 - 2 tablets (325 -650 mg) by oral

 route every 4-6 hours as needed         

 

                Calcium 600 + D(3) 600 mg(1,500mg) -400 unit oral tablet                    2011       take 1 tablet

 by oral route 2 times a day            no longer taking

 

                Vitamin B-12 1,000 mcg oral tablet extended release    2010       take 1

 tablet by oral route daily             no longer taking

 

                Antifungal (clotrimazole) 1 % topical cream    2010       apply to the 

affected and surrounding areas of skin by topical route 2 times per day morning 
and evening                              

 

                sertraline 100 mg oral tablet    5/10/2011       2011       take 2 tablets (200 mg) by 

oral route once daily                   discontinued by Dr. Serrano

 

                mirtazapine 15 mg oral tablet                    2011        take 1 tablet (15 mg) by oral route 

once daily before bedtime               Dr. Serrano

 

                mirtazapine 15 mg oral tablet                    2011        take 1 tablet (15 mg) by oral route 

once daily before bedtime               dc'd by Dr. Serrano

 

                Pristiq 50 mg oral tablet extended release 24 hr                    2013        take 1 tablet (50

 mg) by oral route once daily           Dr. Serrano

 

                Pristiq 50 mg oral tablet extended release 24 hr                    2013        take 1 tablet (50

 mg) by oral route once daily           dose updated

 

                Vitamin B-12 1,000 mcg/mL injection solution    2011        inject 1 milliliter

 (1,000 mcg) by intramuscular route once a month    on list already

 

                    syringe with needle 1 mL 25 gauge x 1" miscellaneous syringe    2011

                          use for injection once a month     

 

                clotrimazole 1 % topical cream    2011        apply to the affected and surrounding

 areas of skin by topical route 2 times per day in the morning and evening     

 

                Vitamin D2 50,000 unit oral capsule    2011        take 1 capsule (50,000

 unit) by oral route once weekly        generic on list

 

                Pravachol 40 mg oral tablet    2012        take 1 tablet (40 mg) by oral 

route once daily for 90 days            generic on list

 

                lithium carbonate 300 mg oral capsule    2012        take 1 capsule by oral

 route daily                            dose updated

 

                Pristiq 100 mg oral tablet extended release 24 hr                    4/10/2012       take 1 and 1/2 

tablet (150 mg) by oral route once daily    Mental Health provider

 

                Pristiq 100 mg oral tablet extended release 24 hr                    4/10/2012       take 1 and 1/2 

tablet (150 mg) by oral route once daily    Discontinued by [facility] at [facility]

 

                hydroxyzine HCl 50 mg oral tablet    10/16/2014      2015       take 1 tablet (50 mg) 

by oral route at bedtime                 

 

                lithium carbonate 300 mg oral capsule    2015       take 1 capsule (300

 mg) by oral route 2 for 30 days         

 

                fluconazole 100 mg oral tablet    2015       12/3/2015       take 1 tablet (100 mg) by 

oral route once a week                   

 

                ketoconazole 2 % topical cream    2015       12/3/2015       apply to the affected area(s)

 by topical route 2 times per day        

 

                prednisone 10 mg oral tablet    12/3/2015       2016        take 2 tablets (20 mg) by oral

 route once daily for 4 days 1 tablet daily for 4 days 0.5 tablet daily for 4 
days                                     

 

                triamcinolone acetonide 0.1 % topical cream    2016       apply a thin layer

 to the affected area(s) by topical route 2 times per day     

 

                Cipro 500 mg oral tablet    1/15/2017       2017       take 1 tablet (500 mg) by oral route

 every 12 hours for 10 days              







Problem List







                    Description         Status              Onset

 

                    Artificial opening status; colostomy    Active               

 

                    Bipolar disorder, unspecified    Active               

 

                    Hyperlipidemia      Active               

 

                    Peritoneal Neoplasm, Malignant    Active               

 

                    Anemia, Pernicious    Active               

 

                    Arthritis unspecified    Active               

 

                    B12 deficiency      Active               







Vital Signs







      Date    Time    BP-Sys(mm[Hg]    BP-Lynn(mm[Hg])    HR(bpm)    RR(rpm)    Temp    WT    HT    HC    BMI

                    BSA                 BMI Percentile      O2 Sat(%)

 

        2017    1:34:00 PM    118 mmHg    62 mmHg    122 bpm    18 rpm    97.8 F    161.375 lbs    

69 in                     23.83 kg/m2    1.89 m2                   96 %

 

        2017    3:05:00 PM    134 mmHg    70 mmHg    70 bpm    20 rpm    97.4 F    181 lbs    69 in

                          26.7288 kg/m    1.9992 m                 98 %

 

        2017    11:07:00 AM    124 mmHg    64 mmHg    62 bpm    17 rpm    98.2 F    181.2 lbs    69

 in                       26.76 kg/m2    2.00 m2                   98 %

 

        1/15/2017    3:34:00 PM    148 mmHg    89 mmHg    69 bpm    20 rpm    98.2 F    179 lbs    69 in

                          26.4334 kg/m    1.9882 m                 98 %

 

       2017    1:51:00 PM    160 mmHg    90 mmHg    100 bpm    20 rpm    96.5 F    179 lbs             

                                                                98 %

 

       2016    3:11:00 PM    134 mmHg    76 mmHg    80 bpm    20 rpm    98 F    163 lbs    69 in     

                24.0706 kg/m    1.8972 m                      98 %

 

        2016    2:04:00 PM    142 mmHg    86 mmHg    68 bpm    16 rpm    98.5 F    166 lbs    63 in

                          29.41 kg/m2    1.83 m2                   100 %

 

        2016    11:27:00 AM    148 mmHg    78 mmHg    90 bpm    20 rpm    98.2 F    153 lbs    69 in

                          22.5939 kg/m    1.8381 m                 96 %

 

        12/3/2015    9:50:00 AM    132 mmHg    70 mmHg    62 bpm    16 rpm    97.9 F    145 lbs    69 in

                          21.41 kg/m2    1.79 m2                   100 %

 

        2015    8:52:00 AM    132 mmHg    68 mmHg    52 bpm    20 rpm    97.8 F    141 lbs    69 in

                          20.8218 kg/m    1.7645 m                 100 %

 

        2015    3:25:00 PM    120 mmHg    62 mmHg    72 bpm    16 rpm    98.1 F    136 lbs    69 in

                          20.08 kg/m2    1.73 m2                   98 %

 

       3/23/2015    2:55:00 PM    130 mmHg    76 mmHg    68 bpm    18 rpm    97 F    140 lbs    69 in    

                20.6742 kg/m    1.7583 m                      98 %

 

        10/16/2014    11:11:00 AM    120 mmHg    66 mmHg    77 bpm    20 rpm    98 F    130 lbs    69 in

                          19.20 kg/m2    1.69 m2                   100 %

 

        2014    3:21:00 PM    130 mmHg    66 mmHg    63 bpm    18 rpm    97.2 F    160 lbs    69 in

                          23.6276 kg/m    1.8797 m                 99 %

 

        2013    10:35:00 AM    132 mmHg    70 mmHg    66 bpm    20 rpm    98.1 F    157 lbs    69 in

                          23.18 kg/m2    1.86 m2                    

 

        2013    1:29:00 PM    132 mmHg    70 mmHg    76 bpm    18 rpm    98.2 F    166 lbs    69 in 

                          24.5137 kg/m    1.9146 m                  

 

       2013    2:46:00 PM    128 mmHg    70 mmHg    76 bpm    16 rpm    98 F    160 lbs    69 in     

                23.63 kg/m2     1.88 m2                          

 

        2011    8:49:00 AM    128 mmHg    78 mmHg    70 bpm    18 rpm    97.9 F    164 lbs    69 in

                          24.2183 kg/m    1.903 m                  

 

     2011    1:31:00 PM    132 mmHg    68 mmHg    84 bpm         97 F    167 lbs                        

                                         

 

        2011    9:09:00 AM    128 mmHg    70 mmHg    72 bpm    18 rpm    98.2 F    163 lbs    64 in 

                          27.9786 kg/m    1.8272 m                  

 

       2011    10:01:00 AM    132 mmHg    70 mmHg    72 bpm    18 rpm    98.2 F    154 lbs             

                                                                 

 

       2011    2:47:00 PM    128 mmHg    70 mmHg    72 bpm    18 rpm    97.8 F    156 lbs             

                                                                 

 

       5/10/2011    3:16:00 PM    144 mmHg    80 mmHg    72 bpm    18 rpm    98.2 F    158 lbs             

                                                                 

 

        2011    10:11:00 AM    132 mmHg    70 mmHg    70 bpm    18 rpm    98.2 F    168 lbs    69 in

                          24.809 kg/m    1.9261 m                  

 

        4/15/2011    10:52:00 AM    110 mmHg    60 mmHg    75 bpm    16 rpm    97.5 F    172.375 lbs    

69 in                     25.46 kg/m2    1.95 m2                   100 %

 

        2011    11:43:00 AM    120 mmHg    82 mmHg    75 bpm    16 rpm    97.2 F    178.5 lbs    69

 in                       26.3596 kg/m    1.9854 m                 100 %

 

        10/15/2010    1:32:00 PM    120 mmHg    70 mmHg    80 bpm    18 rpm    96.6 F    177 lbs    69 in

                          26.14 kg/m2    1.98 m2                   100 %

 

        2010    3:50:00 PM    168 mmHg    100 mmHg    82 bpm    18 rpm    97.8 F    177.5 lbs    69

 in                       26.2119 kg/m    1.9798 m                 97 %

 

        2010    1:21:00 PM    140 mmHg    80 mmHg    59 bpm    16 rpm    97.6 F    173.25 lbs    69 

in                        25.58 kg/m2    1.96 m2                   100 %

 

        2010    3:02:00 PM    140 mmHg    80 mmHg    61 bpm    16 rpm    97.6 F    173.125 lbs    69

 in                       25.5658 kg/m    1.9553 m                 99 %

 

        2010    1:23:00 PM    130 mmHg    80 mmHg    66 bpm    16 rpm    96.8 F    173 lbs    69 in 

                          25.55 kg/m2    1.95 m2                   100 %

 

        2010    12:58:00 PM    130 mmHg    88 mmHg    75 bpm    16 rpm    98.4 F    172.25 lbs    69

 in                       25.4366 kg/m    1.9503 m                 100 %







Social History







                    Name                Description         Comments

 

                    denies alcohol use                         

 

                    denies smoking                           

 

                    Denies illicit substance abuse                         

 

                    retired                                 direct care

 

                    Single                                   

 

                    Exercises regularly                         

 

                    Attended some college                         

 

                    Tobacco             Never smoker         







History of Procedures







                    Date Ordered        Description         Order Status

 

                    2010 12:00 AM    COMPREHEN METABOLIC PANEL    Reviewed

 

                    2010 12:00 AM    COMPLETE CBC W/AUTO DIFF WBC    Reviewed

 

                    2010 12:00 AM    LIPID PANEL         Reviewed

 

                          2015 12:00 AM        B12 Injection, Up to 1000 Mcg NDC#9217-7794-35 [facility] 

                                        Reviewed

 

                    2011 12:00 AM    MAMMOGRAM SCREENING    Reviewed

 

                    2011 12:00 AM    CYTOPATH C/V THIN LAYER    Reviewed

 

                    2011 12:00 AM    B12 Injection 1 cc NDC#[address]-89    Reviewed

 

                    2015 12:00 AM    THER/PROPH/DIAG INJ SC/IM    Reviewed

 

                    2015 12:00 AM    B12 Injection, Up to 1000 Mcg NDC#6531-5263-41    Reviewed

 

                    2011 12:00 AM    THER/PROPH/DIAG INJ SC/IM    Reviewed

 

                    2011 12:00 AM    B12 Injection([place]) Ndc#5520-4987-21-    Reviewed

 

                    2015 12:00 AM    THER/PROPH/DIAG INJ SC/IM    Reviewed

 

                    2015 12:00 AM    B12 Injection, Up to 1000 Mcg NDC#1929-4129-37    Reviewed

 

                    10/20/2011 12:00 AM    THER/PROPH/DIAG INJ SC/IM    Reviewed

 

                    10/20/2011 12:00 AM    B12 Injection([place]) Ndc#8620-0493-17-    Reviewed

 

                    2016 12:00 AM    THER/PROPH/DIAG INJ SC/IM    Reviewed

 

                    2016 12:00 AM    B12 Injection, Up to 1000 Mcg NDC#4256-0316-89    Reviewed

 

                    3/14/2016 12:00 AM    VITAMIN B-12        Reviewed

 

                    3/15/2016 12:00 AM    THER/PROPH/DIAG INJ SC/IM    Reviewed

 

                    3/15/2016 12:00 AM    B12 Injection, Up to 1000 Mcg NDC#9227-6055-29    Reviewed

 

                    2011 12:00 AM    ***Immunization administration, Medicare flu    Reviewed

 

                    2011 12:00 AM    Fluzone ** MEDICARE Only **    Reviewed

 

                    2011 12:00 AM    THER/PROPH/DIAG INJ SC/IM    Reviewed

 

                    2011 12:00 AM    B12 Injection ([employer] [facility]) Ndc#8137-9961-77    Reviewed

 

                    2016 12:00 AM    B12 Injection, Up to 1000 Mcg NDC#4241-1755-05 [facility]    

Reviewed

 

                    2016 12:00 AM    TTE W/DOPPLER COMPLETE    Reviewed

 

                    2016 12:00 AM    EXTREMITY STUDY     Reviewed

 

                          2016 12:00 AM        B12 Injection, Up to 1000 Mcg NDC#0523-5876-43 [facility] 

                                        Reviewed

 

                    2016 12:00 AM    THER/PROPH/DIAG INJ SC/IM    Reviewed

 

                    2016 12:00 AM    B12 Injection, Up to 1000 Mcg NDC#2883-1800-74    Reviewed

 

                    2016 12:00 AM    THER/PROPH/DIAG INJ SC/IM    Reviewed

 

                    2012 12:00 AM    B12 Injection([place]) Ndc#4331-3243-47-    Reviewed

 

                    2016 12:00 AM    B12 Injection, Up to 1000 Mcg NDC#8946-2038-82    Reviewed

 

                    2016 12:00 AM    THER/PROPH/DIAG INJ SC/IM    Reviewed

 

                    2012 12:00 AM    THER/PROPH/DIAG INJ SC/IM    Reviewed

 

                    2012 12:00 AM    B12 Injection ([employer]) Ndc#5877-5462-79    Reviewed

 

                    2016 12:00 AM    THER/PROPH/DIAG INJ SC/IM    Reviewed

 

                    2016 12:00 AM    B12 Injection, Up to 1000 Mcg NDC#7780-2061-44    Reviewed

 

                    2016 12:00 AM    B12 Injection, Up to 1000 Mcg NDC#8359-6005-39    Reviewed

 

                    2016 12:00 AM    THER/PROPH/DIAG INJ SC/IM    Reviewed

 

                    2012 12:00 AM    THER/PROPH/DIAG INJ SC/IM    Reviewed

 

                    2012 12:00 AM    B12 Injection([place]) Ndc#2179-3006-95-    Reviewed

 

                    12/15/2016 12:00 AM    B12 Injection, Up to [address] NDC#8256-3779-56    Reviewed

 

                    12/15/2016 12:00 AM    THER/PROPH/DIAG INJ SC/IM    Reviewed

 

                    2016 12:00 AM    URNLS DIP STICK/TABLET RGNT AUTO W/O MICROSCOPY    Reviewed

 

                    1/3/2017 12:00 AM    URNLS DIP STICK/TABLET RGNT AUTO W/O MICROSCOPY    Reviewed

 

                    2017 12:00 AM    URINE CULTURE/COLONY COUNT    Reviewed

 

                    2017 12:00 AM    Rocephin 1 gram Injection, [facility]    Reviewed

 

                    2017 12:00 AM    THERAPEUTIC PROPHYLACTIC/DX INJECTION SUBQ/IM    Reviewed

 

                    2017 12:00 AM    B12 1000[facility] Injection, [facility]    Reviewed

 

                    5/3/2012 12:00 AM    THER/PROPH/DIAG INJ SC/IM    Reviewed

 

                    5/3/2012 12:00 AM    B12 Injection([place]) Ndc#8211-9711-20-    Reviewed

 

                    2017 12:00 AM    THERAPEUTIC PROPHYLACTIC/DX INJECTION SUBQ/IM    Reviewed

 

                    2017 12:00 AM    B12 1000[address] Injection, [facility]    Reviewed

 

                    2017 12:00 AM    MRI BRAIN STEM W/O & W/DYE    Reviewed

 

                    2017 12:00 AM    VITAMIN B-12        Reviewed

 

                    2017 12:00 AM    Speech Therapy Consult    Reviewed

 

                    2017 12:00 AM    ASSAY OF CREATININE    Reviewed

 

                    2012 12:00 AM    IMMUNOTHERAPY INJECTIONS    Reviewed

 

                    2012 12:00 AM    B12 Injection([place]) Ndc#4364-1712-20-    Reviewed

 

                    2017 12:00 AM    URINALYSIS AUTO W/SCOPE    Reviewed

 

                    2012 12:00 AM    THER/PROPH/DIAG INJ SC/IM    Reviewed

 

                    2012 12:00 AM    B12 Injection, Up to [address] NDC#3332-5829-58    Reviewed

 

                    2017 12:00 AM    URINALYSIS AUTO W/SCOPE    Reviewed

 

                    2017 2:18 PM    URINALYSIS AUTO W/O SCOPE    Reviewed

 

                    2017 12:00 AM    URINE CULTURE/COLONY COUNT    Reviewed

 

                    2017 12:00 AM    B12 1000[address] Injection    Reviewed

 

                    2017 12:00 AM    URNLS DIP STICK/TABLET RGNT AUTO W/O MICROSCOPY    Reviewed

 

                    2017 12:00 AM    METABOLIC PANEL TOTAL CA    Reviewed

 

                    2017 12:00 AM    URNLS DIP STICK/TABLET RGNT AUTO W/O MICROSCOPY    Reviewed

 

                    2012 12:00 AM    THER/PROPH/DIAG INJ SC/IM    Reviewed

 

                    2012 12:00 AM    B12 Injection, Up to [address] NDC#8910-3032-89    Reviewed

 

                    2012 12:00 AM    THER/PROPH/DIAG INJ SC/IM    Reviewed

 

                    2012 12:00 AM    B12 Injection, Up to [address] NDC#4899-6366-28    Reviewed

 

                    10/16/2012 12:00 AM    THER/PROPH/DIAG INJ SC/IM    Reviewed

 

                    10/16/2012 12:00 AM    B12 Injection, Up to 1000 [address] NDC#2668-5448-52    Reviewed

 

                    2010 12:00 AM    COMPREHEN METABOLIC PANEL    Reviewed

 

                    2010 12:00 AM    COMPLETE CBC W/AUTO DIFF WBC    Reviewed

 

                    2010 12:00 AM    LIPID PANEL         Reviewed

 

                    2013 12:00 AM    Flu Injection 3 Years And Above NDC# [address]-42  [facility]    Reviewed



 

                    2013 12:00 AM    COMPLETE CBC W/AUTO DIFF WBC    Reviewed

 

                    2013 12:00 AM    ASSAY OF LITHIUM    Reviewed

 

                    2013 12:00 AM    METABOLIC PANEL TOTAL CA    Reviewed

 

                    4/3/2013 12:00 AM    THER/PROPH/DIAG INJ SC/IM    Reviewed

 

                    4/3/2013 12:00 AM    B12 Injection, Up to [address] NDC#4720-5995-78    Reviewed

 

                    2013 12:00 AM    THER/PROPH/DIAG INJ SC/IM    Reviewed

 

                    2013 12:00 AM    B12 Injection, Up to 1000 [address] NDC#4528-6129-96    Reviewed

 

                    2013 12:00 AM    THER/PROPH/DIAG INJ SC/IM    Reviewed

 

                    2013 12:00 AM    B12 Injection, Up to 1000 [address] NDC#9411-4800-04    Reviewed

 

                    2013 12:00 AM    LIPID PANEL         Reviewed

 

                    2013 12:00 AM    VITAMIN D 25 HYDROXY    Reviewed

 

                    2013 12:00 AM    THER/PROPH/DIAG INJ SC/IM    Reviewed

 

                    2013 12:00 AM    B12 Injection, Up to 1000 [address] NDC#0954-2648-93    Reviewed

 

                    2013 12:00 AM    THER/PROPH/DIAG INJ SC/IM    Reviewed

 

                    3/6/2014 12:00 AM    THER/PROPH/DIAG INJ SC/IM    Reviewed

 

                    2014 12:00 AM    THER/PROPH/DIAG INJ SC/IM    Reviewed

 

                    2014 12:00 AM    B12 Injection, Up to [address] NDC#5822-2169-94    Reviewed

 

                    2010 12:00 AM    SKIN FUNGI CULTURE    Reviewed

 

                    10/9/2010 12:00 AM    COMPREHEN METABOLIC PANEL    Reviewed

 

                    10/9/2010 12:00 AM    LIPID PANEL         Reviewed

 

                    2010 12:00 AM    THER/PROPH/DIAG INJ SC/IM    Reviewed

 

                    2010 12:00 AM    B12 Injection Ndc#[address]-21 ([facility])    Reviewed

 

                    2010 12:00 AM    THER/PROPH/DIAG INJ SC/IM    Reviewed

 

                    2010 12:00 AM    Kenalog 40 Mg Im-Ndc#[address]-96 ([place])    Reviewed

 

                    10/15/2010 12:00 AM    FLU VACCINE 3 YRS & > IM    Reviewed

 

                    10/15/2010 12:00 AM    Admin.Of M/C Cov.Vaccine-Flu Vacc.    Reviewed

 

                    1/15/2011 12:00 AM    COMPLETE CBC W/AUTO DIFF WBC    Reviewed

 

                    1/15/2011 12:00 AM    COMPREHEN METABOLIC PANEL    Reviewed

 

                    1/15/2011 12:00 AM    LIPID PANEL         Reviewed

 

                    2014 12:00 AM    MAMMOGRAM SCREENING    Reviewed

 

                    2014 12:00 AM    Screening mammography, bilateral    Reviewed

 

                    7/10/2014 12:00 AM    THER/PROPH/DIAG INJ SC/IM    Reviewed

 

                    7/10/2014 12:00 AM    B12 Injection, Up to [address] NDC#3892-2676-56    Reviewed

 

                    2011 12:00 AM    COMPLETE CBC W/AUTO DIFF WBC    Reviewed

 

                    2011 12:00 AM    COMPREHEN METABOLIC PANEL    Reviewed

 

                    2011 12:00 AM    LIPID PANEL         Reviewed

 

                    2014 12:00 AM    B12 Injection, Up to [address] NDC#1136-7259-52    Reviewed

 

                    10/19/2014 12:00 AM    MAMMOGRAM SCREENING    Reviewed

 

                    10/19/2014 12:00 AM    Screening mammography, bilateral    Reviewed

 

                    10/16/2014 12:00 AM    B12 Injection, Up to [address] NDC#8525-2118-62    Reviewed

 

                    10/16/2014 12:00 AM    COMPLETE CBC W/AUTO DIFF WBC    Reviewed

 

                    10/16/2014 12:00 AM    COMPREHEN METABOLIC PANEL    Reviewed

 

                    10/16/2014 12:00 AM    IMMUNOASSAY TUMOR     Reviewed

 

                    10/16/2014 12:00 AM    LIPID PANEL         Reviewed

 

                    10/16/2014 12:00 AM    ASSAY OF LITHIUM    Reviewed

 

                    10/16/2014 12:00 AM    MAMMOGRAM SCREENING    Reviewed

 

                    2011 12:00 AM    ASSAY OF PARATHORMONE    Reviewed

 

                    2011 12:00 AM    VITAMIN D 25 HYDROXY    Reviewed

 

                    2011 12:00 AM    ASSAY OF LITHIUM    Reviewed

 

                    2011 12:00 AM    METABOLIC PANEL TOTAL CA    Reviewed

 

                    2011 12:00 AM    CT HEAD/BRAIN W/O & W/DYE    Reviewed

 

                    3/23/2015 12:00 AM    PNEUMOCOCCAL VACC 13 GLENDY IM    Reviewed

 

                    3/23/2015 12:00 AM    Vitamin B12 injection    Reviewed

 

                    2011 12:00 AM    ASSAY OF LITHIUM    Reviewed

 

                    2011 12:00 AM    B12 Injection Ndc#[address]-24  [place]    Reviewed

 

                    2015 12:00 AM    THER/PROPH/DIAG INJ SC/IM    Reviewed

 

                    2015 12:00 AM    B12 Injection, Up to 1000 [address]g NDC#4037-5955-58    Reviewed

 

                    2015 12:00 AM    COMPLETE CBC W/AUTO DIFF WBC    Reviewed

 

                    2015 12:00 AM    COMPREHEN METABOLIC PANEL    Reviewed

 

                    2015 12:00 AM    LIPID PANEL         Reviewed

 

                    2015 12:00 AM    ASSAY OF LITHIUM    Reviewed

 

                    2011 12:00 AM    VIT D 1 25-DIHYDROXY    Reviewed

 

                    2011 12:00 AM    VITAMIN B-12        Reviewed

 

                    2015 12:00 AM    B12 Injection, Up to [address] NDC#4126-7117-83    Reviewed

 

                    2015 12:00 AM    THER/PROPH/DIAG INJ SC/IM    Reviewed

 

                    2015 12:00 AM    B12 Injection, Up to [address] NDC#4195-9654-37    Reviewed

 

                    2011 12:00 AM    THER/PROPH/DIAG INJ SC/IM    Reviewed

 

                    2011 12:00 AM    B12 Injection ([employer]) Ndc#8330-7116-04    Reviewed

 

                    2015 12:00 AM    THER/PROPH/DIAG INJ SC/IM    Reviewed

 

                    2015 12:00 AM    B12 Injection, Up to [address] NDC#3533-6805-21    Reviewed







Results Summary







                          Date and Description      Results

 

                          2004 12:00 AM        Colonoscopy-Women and Men over 50 Normal 

 

                          2008 12:00 AM         Pap Smear Declined 

 

                          10/7/2009 12:00 AM        Cholest Cry Stone Ql .0 %LDLc SerPl-mCnc 123.0 mg/dLHDLc

 SerPl-mCnc 34.0 mg/dLTrigl SerPl-mCnc 190.0 mg/dLGlucose SerPl-mCnc 78.0 mg/dL

 

                          2009 12:00 AM        Mammogram -Women over 40 Normal HIV1+2 Ab Ser Ql no risk 

 

                          2010 8:47 AM         Dexa Bone Scan Refused Aspirin reccommended Contraindication 



 

                          2010 8:48 AM         Depression Done 

 

                          2010 12:00 AM         Foot Exam-Diabetic Done 

 

                          2010 12:00 AM         Cholest Cry Stone Ql .0 %LDLc SerPl-mCnc 126.0 mg/dLGlucose

 SerPl-mCnc 102.0 mg/dL

 

                          2010 8:45 AM          TRIGLYCERIDES 122.0 mg/dLCHOLESTEROL 186.0 mg/dLHDL 36.0 mg/dLTOT

 CHOL/HDL 5.2 LDL (CALC) 126.0 mg/dLGLUCOSE 102.0 mg/dLSODIUM 143.0 
mmol/LPOTASSIUM 3.70 mmol/LCHLORIDE 111.0 mmol/LCO2 23.0 mmol/LBUN 10.0 
mg/dLCREATININE 0.80 mg/dLSGOT/AST 12.0 IU/LSGPT/ALT 11.0 IU/LALK PHOS 65.0 
IU/LTOTAL PROTEIN 7.20 g/dLALBUMIN 3.90 g/dLTOTAL BILI 0.50 mg/dLCALCIUM 10.20 
mg/dLAGE 59 GFR NonAA 73 GFR AA 88 eGFR >60 mL/min/1.73 m2eGFR AA* >60 WBC 5.7 
RBC 3.26 HGB 10.60 g/dLHCT 31.70 %MCV 97.0 fLMCH 32.50 pgMCHC 33.40 g/dLRDW SD 
47 RDW CV 13.30 %MPV 9.70 fLPLT 287 NRBC# 0.00 NRBC% 0.0 %NEUT 62.90 %%LYMP 
21.80 %%MONO 9.90 %%EOS 5.0 %%BASO 0.40 %#NEUT 3.56 #LYMP 1.23 #MONO 0.56 #EOS 
0.28 #BASO 0.02 MANUAL DIFF NOT IND 

 

                          2010 12:00 AM        Glucose SerPl-mCnc 96.0 mg/dLCholest Cry Stone Ql .0 %LDLc

 SerPl-mCnc 146.0 mg/dL

 

                          2010 8:26 AM         TRIGLYCERIDES 106.0 mg/dLCHOLESTEROL 199.0 mg/dLHDL 32.0 mg/dLTOT

 CHOL/HDL 6.2 LDL (CALC) 146.0 mg/dLGLUCOSE 96.0 mg/dLSODIUM 143.0 
mmol/LPOTASSIUM 4.0 mmol/LCHLORIDE 113.0 mmol/LCO2 24.0 mmol/LBUN 13.0 
mg/dLCREATININE 1.0 mg/dLSGOT/AST 11.0 IU/LSGPT/ALT 6.0 IU/LALK PHOS 56.0 
IU/LTOTAL PROTEIN 6.60 g/dLALBUMIN 3.80 g/dLTOTAL BILI 0.50 mg/dLCALCIUM 9.30 
mg/dLAGE 59 GFR NonAA 57 GFR AA 69 eGFR 57 eGFR AA* >60 

 

                          10/6/2010 12:00 AM        Cholest Cry Stone Ql .0 %LDLc SerPl-mCnc 111.0 mg/dLGlucose

 SerPl-mCnc 81.0 mg/dL

 

                          10/6/2010 2:45 PM         TRIGLYCERIDES 123.0 mg/dLCHOLESTEROL 178.0 mg/dLHDL 42.0 mg/dLTOT

 CHOL/HDL 4.2 LDL (CALC) 111.0 mg/dLGLUCOSE 81.0 mg/dLSODIUM 139.0 
mmol/LPOTASSIUM 4.10 mmol/LCHLORIDE 106.0 mmol/LCO2 24.0 mmol/LBUN 13.0 
mg/dLCREATININE 0.90 mg/dLSGOT/AST 13.0 IU/LSGPT/ALT 11.0 IU/LALK PHOS 61.0 
IU/LTOTAL PROTEIN 7.10 g/dLALBUMIN 3.90 g/dLTOTAL BILI 0.30 mg/dLCALCIUM 9.30 
mg/dLAGE 60 GFR NonAA 64 GFR AA 78 eGFR >60 mL/min/1.73 m2eGFR AA* >60 WBC 6.9 
RBC 3.59 HGB 11.50 g/dLHCT 35.30 %MCV 98.0 fLMCH 32.0 pgMCHC 32.60 g/dLRDW SD 46
 RDW CV 12.90 %MPV 9.90 fLPLT 311 NRBC# 0.00 NRBC% 0.0 %NEUT 64.90 %%LYMP 22.50 
%%MONO 7.20 %%EOS 5.10 %%BASO 0.30 %#NEUT 4.45 #LYMP 1.54 #MONO 0.49 #EOS 0.35 
#BASO 0.02 MANUAL DIFF NOT IND 

 

                          2011 12:00 AM         Mammogram -Women over 40 Ordered 

 

                          2011 10:25 AM        TRIGLYCERIDES 111.0 mg/dLCHOLESTEROL 195.0 mg/dLHDL 43.0 mg/dLTOT

 CHOL/HDL 4.5 LDL (CALC) 130.0 mg/dLWBC 5.3 RBC 3.76 HGB 12.0 g/dLHCT 37.80 %MCV
 101.0 fLMCH 31.90 pgMCHC 31.70 g/dLRDW SD 47 RDW CV 13.0 %MPV 9.70 fLPLT 259 
NRBC# 0.00 NRBC% 0.0 %NEUT 69.0 %%LYMP 17.60 %%MONO 8.30 %%EOS 4.70 %%BASO 0.40 
%#NEUT 3.63 #LYMP 0.93 #MONO 0.44 #EOS 0.25 #BASO 0.02 MANUAL DIFF NOT IND 
GLUCOSE 102.0 mg/dLSODIUM 146.0 mmol/LPOTASSIUM 4.20 mmol/LCHLORIDE 113.0 
mmol/LCO2 23.0 mmol/LBUN 15.0 mg/dLCREATININE 1.0 mg/dLSGOT/AST 12.0 
IU/LSGPT/ALT 17.0 IU/LALK PHOS 60.0 IU/LTOTAL PROTEIN 6.90 g/dLALBUMIN 4.20 
g/dLTOTAL BILI 0.40 mg/dLCALCIUM 9.70 mg/dLAGE 60 GFR NonAA 57 GFR AA 69 eGFR 57
 eGFR AA* >60 

 

                          2011 11:49 AM        Cholest Cry Stone Ql .0 %LDLc SerPl-mCnc 130.0 mg/dLHDLc

 SerPl-mCnc 43.0 mg/dLTrigl SerPl-mCnc 111.0 mg/dLGlucose SerPl-mCnc 102.0 mg/dL

 

                          2011 11:52 AM        Pap Smear Declined 

 

                          2011 11:28 AM        Lithium 2.080 mmol/LGLUCOSE 102.0 mg/dLSODIUM 135.0 mmol/LPOTASSIUM

 3.90 mmol/LCHLORIDE 106.0 mmol/LCO2 21.0 mmol/LBUN 12.0 mg/dLCREATININE 1.30 
mg/dLCALCIUM 10.70 mg/dLAGE 60 GFR NonAA 42 GFR AA 51 eGFR 42 eGFR AA* 51 

 

                          2011 8:58 AM          Lithium 0.690 mmol/L

 

                          2011 2:38 PM         VITAMIN B12 3483.0 pg/mL

 

                          2013 3:35 PM          WBC 5.1 RBC 3.73 HGB 11.70 g/dLHCT 36.40 %MCV 98.0 fLMCH 31.40

 pgMCHC 32.10 g/dLRDW SD 47 RDW CV 13.10 %MPV 9.80 fLPLT 224 NRBC# 0.00 NRBC% 
0.0 %NEUT 66.80 %%LYMP 19.10 %%MONO 9.0 %%EOS 4.90 %%BASO 0.20 %#NEUT 3.42 #LYMP
 0.98 #MONO 0.46 #EOS 0.25 #BASO 0.01 MANUAL DIFF NOT IND GLUCOSE 88.0 
mg/dLSODIUM 141.0 mmol/LPOTASSIUM 4.10 mmol/LCHLORIDE 110.0 mmol/LCO2 22.0 
mmol/LBUN 22.0 mg/dLCREATININE 1.10 mg/dLCALCIUM 9.80 mg/dLAGE 62 GFR NonAA 50 
GFR AA 61 eGFR 50 eGFR AA* 60 Lithium 0.760 mmol/L

 

                          2013 11:02 AM        TRIGLYCERIDES 106.0 mg/dLCHOLESTEROL 181.0 mg/dLHDL 46.0 mg/dLTOT

 CHOL/HDL 3.9 LDL (CALC) 114.0 mg/dLVITAMIN D 41.10 ng/mL

 

                          10/17/2014 10:10 AM       WBC 5.0 RBC 3.66 HGB 11.60 g/dLHCT 36.80 %.0 fLMCH 31.70

 pgMCHC 31.50 g/dLRDW SD 50 RDW CV 13.50 %MPV 10.10 fLPLT 209 NRBC# 0.00 NRBC% 
0.0 %NEUT 69.20 %%LYMP 21.0 %%MONO 6.40 %%EOS 3.20 %%BASO 0.20 %#NEUT 3.46 #LYMP
 1.05 #MONO 0.32 #EOS 0.16 #BASO 0.01 MANUAL DIFF NOT IND GLUCOSE 100.0 
mg/dLSODIUM 148.0 mmol/LPOTASSIUM 3.90 mmol/LCHLORIDE 114.0 mmol/LCO2 26.0 
mmol/LBUN 12.0 mg/dLCREATININE 1.20 mg/dLSGOT/AST 9.0 IU/LSGPT/ALT <6 IU/LALK 
PHOS 82.0 IU/LTOTAL PROTEIN 6.90 g/dLALBUMIN 4.0 g/dLTOTAL BILI 0.40 
mg/dLCALCIUM 10.50 mg/dLAGE 64 GFR NonAA 45 GFR AA 55 eGFR 45 eGFR AA* 55 
TRIGLYCERIDES 96.0 mg/dLCHOLESTEROL 155.0 mg/dLHDL 38.0 mg/dLTOT CHOL/HDL 4.1 
LDL (CALC) 98.0 mg/dLLithium 0.850 mmol/LCancer Antigen (CA) 125 8.30 U/mL

 

                          2015 10:25 AM        Lithium 0.790 mmol/LWBC 4.8 RBC 3.44 HGB 11.0 g/dLHCT 35.20 

%.0 fLMCH 32.0 pgMCHC 31.30 g/dLRDW SD 53 RDW CV 14.0 %MPV 9.30 fLPLT 210
 NRBC# 0.00 NRBC% 0.0 %NEUT 70.80 %%LYMP 17.20 %%MONO 8.10 %%EOS 3.50 %%BASO 
0.40 %#NEUT 3.41 #LYMP 0.83 #MONO 0.39 #EOS 0.17 #BASO 0.02 MANUAL DIFF NOT IND 
TRIGLYCERIDES 107.0 mg/dLCHOLESTEROL 174.0 mg/dLHDL 43.0 mg/dLTOT CHOL/HDL 4.0 
LDL (CALC) 110.0 mg/dLGLUCOSE 90.0 mg/dLSODIUM 145.0 mmol/LPOTASSIUM 3.80 
mmol/LCHLORIDE 115.0 mmol/LCO2 24.0 mmol/LBUN 17.0 mg/dLCREATININE 1.30 
mg/dLSGOT/AST 18.0 IU/LSGPT/ALT 17.0 IU/LALK PHOS 56.0 IU/LTOTAL PROTEIN 6.70 
g/dLALBUMIN 3.90 g/dLTOTAL BILI 0.40 mg/dLCALCIUM 9.80 mg/dLAGE 64 GFR NonAA 41 
GFR AA 50 eGFR 41 eGFR AA* 50 

 

                          2015 8:50 AM        WBC 5.8 RBC 3.29 HGB 10.70 g/dLHCT 34.0 %.0 fLMCH 32.50

 pgMCHC 31.50 g/dLRDW SD 52 RDW CV 13.60 %MPV 9.60 fLPLT 223 NRBC# 0.00 NRBC% 
0.0 %NEUT 69.60 %%LYMP 18.90 %%MONO 8.50 %%EOS 2.80 %%BASO 0.20 %#NEUT 4.03 
#LYMP 1.09 #MONO 0.49 #EOS 0.16 #BASO 0.01 MANUAL DIFF NOT IND Lithium 0.620 
mmol/LGLUCOSE 83.0 mg/dLSODIUM 139.0 mmol/LPOTASSIUM 3.90 mmol/LCHLORIDE 109.0 
mmol/LCO2 22.0 mmol/LBUN 19.0 mg/dLCREATININE 1.40 mg/dLSGOT/AST 19.0 
IU/LSGPT/ALT 21.0 IU/LALK PHOS 55.0 IU/LTOTAL PROTEIN 6.50 g/dLALBUMIN 3.90 
g/dLTOTAL BILI 0.50 mg/dLCALCIUM 9.60 mg/dLAGE 65 GFR NonAA 38 GFR AA 46 eGFR 38
 eGFR AA* 46 TRIGLYCERIDES 121.0 mg/dLCHOLESTEROL 192.0 mg/dLHDL 51.0 mg/dLTOT 
CHOL/HDL 3.8 .0 mg/dLFREE T4 0.79 TSH 1.210 uIU/mLHemoglobin A1c 5.40 
%Estim. Avg Glu (eAG) 108 

 

                          3/15/2016 8:08 AM         VITAMIN B12 696.0 pg/mL

 

                          3/23/2016 8:26 AM         WBC 7.0 RBC 3.61 HGB 11.80 g/dLHCT 37.70 %.0 fLMCH 32.70

 pgMCHC 31.30 g/dLRDW SD 49 RDW CV 12.50 %MPV 10.0 fLPLT 207 NRBC# 0.00 NRBC% 
0.0 %NEUT 73.60 %%LYMP 16.40 %%MONO 6.60 %%EOS 3.0 %%BASO 0.30 %#NEUT 5.15 #LYMP
 1.15 #MONO 0.46 #EOS 0.21 #BASO 0.02 MANUAL DIFF NOT IND Lithium 0.940 
mmol/LGLUCOSE 108.0 mg/dLSODIUM 143.0 mmol/LPOTASSIUM 4.30 mmol/LCHLORIDE 110.0 
mmol/LCO2 27.0 mmol/LBUN 16.0 mg/dLCREATININE 1.60 mg/dLSGOT/AST 13.0 
IU/LSGPT/ALT 7.0 IU/LALK PHOS 71.0 IU/LTOTAL PROTEIN 6.80 g/dLALBUMIN 4.0 
g/dLTOTAL BILI 0.20 mg/dLCALCIUM 10.40 mg/dLAGE 65 GFR NonAA 32 GFR AA 39 eGFR 
32 eGFR AA* 39 TRIGLYCERIDES 113.0 mg/dLCHOLESTEROL 169.0 mg/dLHDL 42.0 mg/dLTOT
 CHOL/HDL 4.0 LDL (CALC) 104.0 mg/dLFREE T4 0.86 TSH 2.20 uIU/mLHemoglobin A1c 
5.20 %Estim. Avg Glu (eAG) 103 

 

                          3/25/2016 9:17 AM         COLOR YELLOW APPEARANCE CLEAR SPEC GRAV 1.010 pH 7.0 PROTEIN 

NEGATIVE GLUCOSE NEGATIVE mg/dLKETONE NEGATIVE BILIRUBIN NEGATIVE BLOOD NEGATIVE
 NITRITE NEGATIVE [place] SCREEN SMALL MICRO IND? SEE BELOW WBC/HPF 0-5 RBC/HPF 
NEGATIVE CASTS/LPF NEGATIVE /LPFCRYSTALS NEGATIVE MUCOUS THRDS NEGATIVE BACTERIA
 NEGATIVE EPITH CELLS FEW SQUAMOUS /HPFTRICHOMONAS NEGATIVE YEAST NEGATIVE 

 

                          2016 6:00 AM        GLUCOSE 91.0 mg/dLSODIUM 143.0 mmol/LPOTASSIUM 3.60 mmol/LCHLORIDE

 112.0 mmol/LCO2 23.0 mmol/LBUN 22.0 mg/dLCREATININE 1.20 mg/dLSGOT/AST 15.0 
IU/LSGPT/ALT 12.0 IU/LALK PHOS 61.0 IU/LTOTAL PROTEIN 5.40 g/dLALBUMIN 3.10 
g/dLTOTAL BILI 0.40 mg/dLCALCIUM 8.40 mg/dLAGE 66 GFR NonAA 45 GFR AA 55 eGFR 45
 eGFR AA* 55 WBC 3.0 RBC 3.05 HGB 9.80 g/dLHCT 32.10 %.0 fLMCH 32.10 
pgMCHC 30.50 g/dLRDW SD 54 RDW CV 14.20 %MPV 10.10 fLPLT 170 NRBC# 0.00 NRBC% 
0.0 %NEUT 50.70 %%LYMP 32.60 %%MONO 10.50 %%EOS 5.90 %%BASO 0.30 %#NEUT 1.54 
#LYMP 0.99 #MONO 0.32 #EOS 0.18 #BASO 0.01 MANUAL DIFF NOT IND 

 

                          2016 2:09 PM        COLOR YELLOW APPEARANCE CLEAR SPEC GRAV 1.010 pH 5.0 PROTEIN

 30 GLUCOSE NEGATIVE mg/dLKETONE NEGATIVE BILIRUBIN NEGATIVE BLOOD LARGE NITRITE
 NEGATIVE [place] SCREEN MODERATE MICRO INDICATED? SEE BELOW WBC/HPF  RBC/HPF
 20-50 CASTS/LPF NEGATIVE /LPFCRYSTALS NEGATIVE MUCOUS THRDS NEGATIVE BACTERIA 
NEGATIVE EPITH CELLS FEW SQUAMOUS /HPFTRICHOMONAS NEGATIVE YEAST NEGATIVE CULT 
SET UP? YES 

 

                          1/3/2017 4:08 PM          COLOR YELLOW APPEARANCE HAZY SPEC GRAV 1.015 pH 6.0 PROTEIN 30

 GLUCOSE NEGATIVE mg/dLKETONE NEGATIVE BILIRUBIN NEGATIVE BLOOD MODERATE NITRITE
 NEGATIVE [place] SCREEN LARGE MICRO INDICATED? SEE BELOW WBC/-200 RBC/HPF 
5-10 CASTS/LPF NEGATIVE /LPFCRYSTALS NEGATIVE MUCOUS THRDS NEGATIVE BACTERIA 
NEGATIVE EPITH CELLS 1+ SQUAMOUS /HPFTRICHOMONAS NEGATIVE YEAST NEGATIVE CULT 
SET UP? YES 

 

                          2017 4:24 PM         CREATININE 1.50 mg/dLAGE 66 GFR NonAA 35 GFR AA 42 eGFR 35 eGFR

 AA* 42 VITAMIN B12 1324.0 pg/mL

 

                          2017 11:30 AM         GLUCOSE 93.0 mg/dLSODIUM 143.0 mmol/LPOTASSIUM 3.10 mmol/LCHLORIDE

 101.0 mmol/LCO2 29.0 mmol/LBUN 26.0 mg/dLCREATININE 1.50 mg/dLSGOT/AST 23.0 
IU/LSGPT/ALT 13.0 IU/LALK PHOS 66.0 IU/LTOTAL PROTEIN 7.70 g/dLALBUMIN 4.30 
g/dLTOTAL BILI 0.40 mg/dLCALCIUM 10.30 mg/dLAGE 66 GFR NonAA 35 GFR AA 42 eGFR 
35 eGFR AA* 42 TRIGLYCERIDES 147.0 mg/dLCHOLESTEROL 184.0 mg/dLHDL 44.0 mg/dLTOT
 CHOL/HDL 4.2 .0 mg/dLWBC 5.4 RBC 3.98 HGB 12.90 g/dLHCT 40.20 %.0
 fLMCH 32.40 pgMCHC 32.10 g/dLRDW SD 50 RDW CV 13.50 %MPV 9.30 fLPLT 210 NRBC# 
0.00 NRBC% 0.0 %NEUT 54.20 %%LYMP 30.70 %%MONO 9.10 %%EOS 5.20 %%BASO 0.40 
%#NEUT 2.94 #LYMP 1.66 #MONO 0.49 #EOS 0.28 #BASO 0.02 MANUAL DIFF NOT IND FREE 
T4 1.09 COLOR YELLOW APPEARANCE CLEAR SPEC GRAV <=1.005 pH 5.5 PROTEIN NEGATIVE 
GLUCOSE NEGATIVE mg/dLKETONE NEGATIVE BILIRUBIN NEGATIVE BLOOD NEGATIVE NITRITE 
NEGATIVE LEUK SCREEN LARGE MICRO INDICATED? SEE BELOW WBC/HPF 10-20 RBC/HPF 0-5 
CASTS/LPF NEGATIVE /LPFCRYSTALS NEGATIVE MUCOUS THRDS NEGATIVE BACTERIA FEW 
EPITH CELLS 1+ SQUAMOUS /HPFTRICHOMONAS NEGATIVE YEAST NEGATIVE CULT SET UP? YES
 TSH 1.820 uIU/mL

 

                          2017 2:45 PM         COLOR YELLOW APPEARANCE CLEAR SPEC GRAV <=1.005 pH 6.0 PROTEIN

 NEGATIVE GLUCOSE NEGATIVE mg/dLKETONE NEGATIVE BILIRUBIN NEGATIVE BLOOD TRACE-
INTACT NITRITE NEGATIVE LEUK SCREEN SMALL MICRO INDICATED? SEE BELOW WBC/HPF 0-5
 RBC/HPF NEGATIVE CASTS/LPF NEGATIVE /LPFCRYSTALS NEGATIVE MUCOUS THRDS NEGATIVE
 BACTERIA FEW EPITH CELLS FEW SQUAMOUS /HPFTRICHOMONAS NEGATIVE YEAST NEGATIVE 
CULT SET UP? YES 

 

                          2017 11:22 AM        COLOR YELLOW APPEARANCE CLEAR SPEC GRAV <=1.005 pH 6.5 PROTEIN

 NEGATIVE GLUCOSE NEGATIVE mg/dLKETONE NEGATIVE BILIRUBIN NEGATIVE BLOOD 
NEGATIVE NITRITE NEGATIVE LEUK SCREEN NEGATIVE MICRO INDICATED? NOT INDICATED 

 

                          2017 2:18 PM         Clarity Ur cloudy Color Ur dark yellow Glucose Ur-sCnc negative

 Bilirub Ur Ql Strip negative Ketones Ur Ql Strip negative Sp Gr Ur Qn 1.010 Hgb
 Ur Ql Strip trace-intact pH Ur-LsCnc 6.5 Prot Ur Ql Strip trace Urobilinogen 
Ur-mCnc 0.2 Nitrite Ur Ql Strip negative WBC Est Ur Ql Strip large 

 

                          2017 9:28 AM         GLUCOSE 109.0 mg/dLSODIUM 142.0 mmol/LPOTASSIUM 3.60 mmol/LCHLORIDE

 106.0 mmol/LCO2 23.0 mmol/LBUN 11.0 mg/dLCREATININE 1.30 mg/dLCALCIUM 9.80 
mg/dLAGE 66 GFR NonAA 41 GFR AA 50 eGFR 41 eGFR AA* 50 

 

                          2017 9:52 AM         COLOR YELLOW APPEARANCE CLOUDY SPEC GRAV <=1.005 pH 6.5 PROTEIN

 NEGATIVE GLUCOSE NEGATIVE mg/dLKETONE NEGATIVE BILIRUBIN NEGATIVE BLOOD SMALL 
NITRITE POSITIVE [place] SCREEN LARGE MICRO INDICATED? SEE BELOW WBC/-300 
RBC/HPF 5-10 CASTS/LPF NEGATIVE /LPFCRYSTALS NEGATIVE MUCOUS THRDS NEGATIVE 
BACTERIA 2++ EPITH CELLS 1+ SQUAMOUS /HPFTRICHOMONAS NEGATIVE YEAST NEGATIVE 
CULT SET UP? YES 

 

                          2017 10:41 AM         COLOR YELLOW APPEARANCE CLEAR SPEC GRAV <=1.005 pH 6.0 PROTEIN

 NEGATIVE GLUCOSE NEGATIVE mg/dLKETONE NEGATIVE BILIRUBIN NEGATIVE BLOOD 
NEGATIVE NITRITE NEGATIVE [place] SCREEN TRACE MICRO INDICATED? SEE BELOW WBC/HPF 
0-5 RBC/HPF RARE CASTS/LPF NEGATIVE /LPFCRYSTALS NEGATIVE MUCOUS THRDS NEGATIVE 
BACTERIA FEW EPITH CELLS 1+ SQUAMOUS /HPFTRICHOMONAS NEGATIVE YEAST NEGATIVE 
CULT SET UP? NO 







History Of Immunizations







       Name    Date Admin    Mfg Name    Mfg Code    Trade Name    Lot#    Route    Inj    Vis Given    Vis

 Pub                                    CVX

 

        Influenza    2008    Not Entered    NE      Not Entered            Not Entered    Not Entered

                    1            999

 

        X       12/19/2008    [employer]    MSD     Pneumovax 23            Intramuscular    Not Entered

                    1            999

 

           Influenza    10/15/2010    [employer] [facility]    NOV        Fluvirin > 12 Years    

[id number]     Intramuscular    Left Deltoid    10/15/2010    2009    999

 

          X         3/23/2015    [facility]    [facility]       Prevnar 13    [id number]    Intramuscular

                Right Gluteous Medius    3/23/2015       2013       109







History of Past Illness







                    Name                Date of Onset       Comments

 

                    Peritoneal Neoplasm, Malignant                         

 

                    Hyperlipidemia                           

 

                    Bipolar disorder, unspecified                         

 

                    Artificial opening status; colostomy                         

 

                    B12 deficiency                           

 

                    Anemia, Pernicious                         

 

                    Arthritis unspecified                         

 

                    cervical cancer                          

 

                    Artificial opening status; colostomy    2010  1:10PM     

 

                    Bipolar disorder, unspecified    2010  1:10PM     

 

                    Hyperlipidemia      2010  1:10PM     

 

                    Anemia, Pernicious    2010  1:10PM     

 

                    Postoperative Follow-Up    2010  1:55PM     

 

                    Postoperative Follow-Up    Mar  8 2010 10:57AM     

 

                    Artificial opening status; colostomy    Mar  8 2010  1:19PM     

 

                    Peritoneal Neoplasm, Malignant    Mar  8 2010  1:19PM     

 

                    Artificial opening status; colostomy    2010  1:40PM     

 

                    Hyperlipidemia      2010  1:40PM     

 

                    Anemia, Pernicious    2010  1:40PM     

 

                    Peritoneal Neoplasm, Malignant    2010  1:40PM     

 

                    Arthritis unspecified    2010  1:40PM     

 

                    Anemia of Chronic Illness    2010  1:40PM     

 

                    Tinea corporis      2010  3:17PM     

 

                    Bipolar disorder, unspecified    2010  1:33PM     

 

                    Hyperlipidemia      2010  1:33PM     

 

                    Anemia, Pernicious    2010  1:33PM     

 

                    Peritoneal Neoplasm, Malignant    2010  1:33PM     

 

                    B12 deficiency      2010  1:33PM     

 

                    Ethmoidal Sinusitis, Acute    Sep 21 2010  3:53PM     

 

                    Wheezing            Sep 21 2010  3:53PM     

 

                    Flu                 Oct 15 2010  1:40PM     

 

                    Bipolar disorder, unspecified    Oct 15 2010  1:42PM     

 

                    Hyperlipidemia      Oct 15 2010  1:42PM     

 

                    Anemia, Pernicious    Oct 15 2010  1:42PM     

 

                    Peritoneal Neoplasm, Malignant    Oct 15 2010  1:42PM     

 

                    Bipolar disorder, unspecified    2011 12:01PM     

 

                    Hyperlipidemia      2011 12:01PM     

 

                    Anemia, Pernicious    2011 12:01PM     

 

                    Peritoneal Neoplasm, Malignant    2011 12:01PM     

 

                    Bipolar disorder, unspecified    Apr 15 2011 10:55AM     

 

                    Major Depression    2011 10:11AM     

 

                    Bipolar Disorder    2011 10:11AM     

 

                    Cancer              May 10 2011  4:16PM     

 

                    Major Depression    May 10 2011  3:16PM     

 

                    Bipolar Disorder    May 10 2011  3:16PM     

 

                    Hypercalcemia       May 23 2011  2:47PM     

 

                    Bipolar disorder, unspecified    May 23 2011  2:47PM     

 

                    Colon Cancer, Personal History    May 23 2011  2:47PM     

 

                    Bipolar Disorder    May 31 2011  4:39PM     

 

                    Depressive Disorder    2011 10:01AM     

 

                    Vitamin B12 deficiency    2011 10:01AM     

 

                    Vitamin D Deficiency    2011  5:07PM     

 

                    Anemia, Vitamin B12 Deficiency    2011  5:07PM     

 

                    B12 deficiency      2011  3:56PM     

 

                    Routine gynecological examination    Aug  4 2011  9:08AM     

 

                    Screening Examination for Breast Cancer    Aug  4 2011  9:08AM     

 

                    Tinea Corporis      Aug  4 2011  9:08AM     

 

                    Depressive Disorder    Sep 23 2011  8:47AM     

 

                    Contact Dermatitis    Sep 23 2011  8:47AM     

 

                    Anemia, Pernicious    Sep 23 2011  8:47AM     

 

                    B12 deficiency      Sep 23 2011  8:47AM     

 

                    B12 deficiency      Sep 27 2011  2:58PM     

 

                    B12 deficiency      Oct 20 2011  2:34PM     

 

                    Flu                 Dec  9 2011  3:16PM     

 

                    B12 deficiency      Dec  9 2011  3:17PM     

 

                    B12 deficiency      2012  4:52PM     

 

                    B12 deficiency      b 2012 11:10AM     

 

                    B12 deficiency      2012  3:37PM     

 

                    B12 deficiency      May  3 2012  4:10PM     

 

                    B12 deficiency      2012  2:54PM     

 

                    B12 deficiency      2012 11:23AM     

 

                    B12 deficiency      Aug  9 2012  2:08PM     

 

                    B12 deficiency      Sep  6 2012  4:36PM     

 

                    B12 deficiency      Oct 16 2012 10:23AM     

 

                    Flu                 Feb  4   3:11PM     

 

                    Bipolar disorder, unspecified    Feb  2013  2:48PM     

 

                    Anemia, Pernicious    Feb  2013  2:48PM     

 

                    B12 deficiency      Feb  2013  2:48PM     

 

                    Extrapyramidal abnormal movement disorder    Feb  4   2:48PM     

 

                    B12 deficiency      Apr  3 2013 12:03PM     

 

                    Bipolar disorder, unspecified    May  7 2013  1:31PM     

 

                    Anemia, Pernicious    May  7 2013  1:31PM     

 

                    B12 deficiency      May  7 2013  1:31PM     

 

                    Extrapyramidal abnormal movement disorder    May  7 2013  1:31PM     

 

                    B12 deficiency      2013  3:42PM     

 

                    B12 deficiency      2013  1:31PM     

 

                    Hyperlipidemia      Aug  7 2013 10:37AM     

 

                    Vitamin D Deficiency    Aug  7 2013 10:37AM     

 

                    Bipolar disorder, unspecified    Aug  7 2013 10:37AM     

 

                    Anemia, Pernicious    Aug  7 2013 10:37AM     

 

                    B12 deficiency      Aug  7 2013 10:37AM     

 

                    B12 deficiency      Sep 25 2013 11:15AM     

 

                    B12 deficiency      Dec 11 2013  3:16PM     

 

                    B12 deficiency      Mar  6 2014  1:48PM     

 

                    B12 deficiency      May 21 2014  3:17PM     

 

                    Screening Examination for Breast Cancer    2014  3:23PM     

 

                    Periumbilical abdominal pain    2014  3:23PM     

 

                    B12 deficiency      Jul 10 2014  2:52PM     

 

                    Anemia, Vitamin B12 Deficiency    Aug 13 2014  4:50PM     

 

                    Bipolar disorder    Oct 16 2014 11:13AM     

 

                    Hyperlipidemia      Oct 16 2014 11:13AM     

 

                    Anemia, Pernicious    Oct 16 2014 11:13AM     

 

                    Peritoneal Neoplasm, Malignant    Oct 16 2014 11:13AM     

 

                    Screening breast examination    Oct 16 2014 11:13AM     

 

                    Weight loss         Oct 16 2014 11:13AM     

 

                    Anemia, Pernicious    Mar 23 2015  2:57PM     

 

                    B12 deficiency      Mar 23 2015  2:57PM     

 

                    Need for Prevnar vaccine    Mar 23 2015  2:57PM     

 

                    Bipolar disorder    Mar 23 2015  2:57PM     

 

                    Hyperlipidemia      Mar 23 2015  2:57PM     

 

                    Anemia, Pernicious    Mar 23 2015  2:57PM     

 

                    Peritoneal Neoplasm, Malignant    Mar 23 2015  2:57PM     

 

                    B12 deficiency      May  4 2015  4:48PM     

 

                    Hyperlipidemia      May 13 2015  9:56AM     

 

                    Anemia              May 13 2015  9:56AM     

 

                    Bipolar disorder    May 13 2015  9:56AM     

 

                    Bipolar disorder    May 14 2015  3:27PM     

 

                    Hyperlipidemia      May 14 2015  3:27PM     

 

                    Anemia, Pernicious    May 14 2015  3:27PM     

 

                    Peritoneal Neoplasm, Malignant    May 14 2015  3:27PM     

 

                    B12 deficiency      2015  2:20PM     

 

                    B12 deficiency      2015 11:34AM     

 

                    B12 deficiency      Aug 18 2015  9:06AM     

 

                    Tinea Corporis      Sep 18 2015  8:54AM     

 

                    B12 deficiency      Sep 18 2015  8:54AM     

 

                    B12 deficiency      2015 10:28AM     

 

                    Herpes zoster without complication    Dec  3 2015  9:52AM     

 

                    B12 deficiency      Dec 23 2015 11:21AM     

 

                    B12 deficiency      2016  4:51PM     

 

                    Vitamin B 12 deficiency    Mar 14 2016  5:35PM     

 

                    B12 deficiency      Mar 15 2016 12:14PM     

 

                    B12 deficiency      May  5 2016 11:30AM     

 

                    Edema               May  5 2016 11:30AM     

 

                    Dermatitis          May  5 2016 11:30AM     

 

                    Edema               May 17 2016  8:38AM     

 

                    Shortness of breath    May 17 2016  8:38AM     

 

                    Bilateral edema of lower extremity    2016  2:06PM     

 

                    B12 deficiency      2016  2:06PM     

 

                    B12 deficiency      2016 11:50AM     

 

                    B12 deficiency      2016 11:20AM     

 

                    Diarrhea            Aug  2 2016  3:13PM     

 

                    B12 deficiency      Aug 24 2016 11:10AM     

 

                    Encounter for screening mammogram for breast cancer    Aug 24 2016 11:44AM     

 

                    B12 deficiency      Sep 28 2016  2:35PM     

 

                    B12 deficiency      Dec 15 2016  2:02PM     

 

                    Dysuria             Dec 29 2016 12:14PM     

 

                    Hematuria           Fidencio  3 2017  1:33PM     

 

                    Constipation by delayed colonic transit    2017  1:52PM     

 

                    Ileus               2017  1:52PM     

 

                    UTI (urinary tract infection)    Fidencio 15 2017  3:39PM     

 

                    Acute cystitis with hematuria    2017 11:07AM     

 

                    B12 deficiency      2017 11:07AM     

 

                    B12 deficiency      2017 11:40AM     

 

                    B12 deficiency      2017  4:07PM     

 

                    Slurred speech      2017  3:07PM     

 

                    Vitamin B12 deficiency    2017  3:07PM     

 

                    Dysphagia, unspecified type    2017  3:07PM     

 

                    Hyperlipidemia      2017  3:07PM     

 

                    Dysuria             2017 12:01PM     

 

                    B12 deficiency      2017  2:08PM     

 

                    Dysuria             2017 10:58AM     

 

                    Hematuria           May 22 2017  1:36PM     

 

                    Depressive Disorder    May 22 2017  1:36PM     

 

                    Constipation        May 22 2017  1:36PM     

 

                    Fatigue             May 22 2017  1:36PM     

 

                    Urinary tract infection    May 30 2017  9:36AM     

 

                    Hypokalemia         May 30 2017 12:03PM     

 

                    Urinary tract infection    2017  4:36PM     







Payers







           Insurance Name    Company Name    Plan Name    Plan Number    Policy Number    Policy Group

 Number                                 Start Date

 

                    Medicare RHC Medicare RHC              [id number]              N/A

 

                          [employer]    [employer]                 [id number]

                                                    

 

                    Medicare Part A    Medicare - Lab/Xray              [id number]              2006

 

                    Medicare Part B    Medicare [place]              [id number]              2006

 

                          [employer]                 50 percent

                                                    2009

 

                    [facility]    [employer]              [id number]              N/A

 

                    Medicare Part A    Medicare Part A              [id number]              N/A

 

                    Medicare Part A    Medicare Part A              [id number]              2006









History of Encounters







                    Visit Date          Visit Type          Provider

 

                    2017           [facility]            Pranav Angel MD

 

                    2017           Office visit        Bhupinder Aspen DO

 

                    2017           Nurse visit         Bhupinder Aspen DO

 

                    2017           Office visit         

 

                    2017           Office visit        Bhupinder Aspen DO

 

                    2017           Nurse visit         Bhupinder Aspen DO

 

                    2017           Office visit        Radha Ontiveros APRN

 

                    1/15/2017           Office visit        Aj Tapia NP

 

                    2017            Office visit        Devin Masterson MD

 

                    2016          [facility]            Devin Masterson MD

 

                    12/15/2016          Nurse visit         Bhupinder Aspen DO

 

                    2016           Nurse visit         Bret Forte APRN

 

                    2016           Nurse visit         Bhupinder Aspen DO

 

                    2016            Office visit        Bhupinder Aspen DO

 

                    2016           Nurse visit         Bhupinder Aspen DO

 

                    2016           Office visit        Bret Forte APRN

 

                    2016            Office visit        Bhupinder Aspen DO

 

                    3/15/2016           Nurse visit         Bhupinder Aspen DO

 

                    2016            Nurse visit         Bhupinder Aspen DO

 

                    2015          Nurse visit         Bhupinder Aspen DO

 

                    12/3/2015           Office visit        Bhupinder Aspen DO

 

                    2015          Nurse visit         Bhupinder Aspen DO

 

                    2015           Office visit        Bhupinder Aspen DO

 

                    2015           Nurse visit         Bhupinder Aspen DO

 

                    2015            Nurse visit         Bhupinder Aspen DO

 

                    2015            Nurse visit         Bhupinder Aspen DO

 

                    2015           Office visit        Bhupinder Aspen DO

 

                    2015            Nurse visit         Bhupinder Aspen DO

 

                    3/23/2015           Office visit        Bhupinder Aspen DO

 

                    10/16/2014          Office visit        Bhupinder Aspen DO

 

                    2014           Nurse visit         Radha Ontiveros APRN

 

                    7/10/2014           Nurse visit         Bhupinder Aspen DO

 

                    2014           Office visit        Bhupinder Aspen DO

 

                    2014           Nurse visit         Bhupinder Aspen DO

 

                    3/6/2014            Nurse visit         Bhupinder Aspen DO

 

                    2014            [facility]            EARNEST Lopez MD

 

                    2013          Nurse visit         Bhupinder Aspen DO

 

                    2013           Nurse visit         Bhupinder Aspen DO

 

                    2013            Office visit        Bhupinder Aspen DO

 

                    2013            Nurse visit         Bhupinder Aspen DO

 

                    2013            Nurse visit         Bhupinder Aspen DO

 

                    2013            Office visit        Bhupinder Aspen DO

 

                    4/3/2013            Nurse visit         Bhupinder Aspen DO

 

                    2013            Office visit        Bhupinder Aspen DO

 

                    10/16/2012          Nurse visit         Bhupinder Aspen DO

 

                    2012            Nurse visit         Bhupinder Aspen DO

 

                    2012            Voided              Bhupinder Aspen DO

 

                    2012            Nurse visit         Bhupinder Aspen DO

 

                    2012            Nurse visit         Bhupinder Aspen DO

 

                    2012           Nurse visit         Bhupinder Aspen DO

 

                    5/3/2012            Nurse visit         Bhupinder Aspen DO

 

                    2012           Nurse visit         Bhupinder Aspen DO

 

                    2012           Nurse visit         Bhupinder Aspen DO

 

                    2012           Nurse visit         Bhupinder Aspen DO

 

                    2011           Nurse visit         Bhupinder Aspen DO

 

                    10/20/2011          Nurse visit         Bhupinder Aspen DO

 

                    2011           Office visit        Bhupinder Aspen DO

 

                    2011           Nurse visit         Radha GREEN

 

                    2011            Office visit        Bhupinder Aspen DO

 

                    2011           Nurse visit         Bhupinder Aspen DO

 

                    2011            Office visit        Bhupinder Aspen DO

 

                    2011           Office visit        Bhupinder Aspen DO

 

                    5/10/2011           Office visit        Bhupinder Aspen DO

 

                    2011           Office visit        Bhupinder Aspen DO

 

                    4/15/2011           Office visit        Devin Angel DO

 

                    2011           Office visit        Devin Angel DO

 

                    10/15/2010          Office visit        Devin Angel DO

 

                    2010           Office visit        Devin Angel DO

 

                    2010            Office visit        Devin Angel DO

 

                    2010           Office visit        Devin Angel DO

 

                    2010            Office visit        Devin Angel DO

 

                    3/8/2010            Office visit        Devin Masterson MD

 

                    2010            Surgery             Devin Masterson MD

 

                    2010            Office visit        Devin Angel DO

 

                    2010           Surgery             Devin Masterson MD

 

                    2010           Hospital            Devin Masterson MD

 

                    2010           [facility]            Devin Masterson MD

 

                    10/22/2009          Office visit        Devin Angel DO

## 2019-06-26 NOTE — XMS REPORT
MU2 Ambulatory Summary

                             Created on: 03/15/2016



Pauline Gan

External Reference #: [id number]

: 1950

Sex: Female



Demographics







                          Address                   [address]

[place]  [id number]

 

                          Home Phone                [phone]

 

                          Preferred Language        English

 

                          Marital Status            Legally 

 

                          Islam Affiliation     Unknown

 

                          Race                      White

 

                          Ethnic Group              Not  or 





Author







                          Author                    Bhupinder Louise

 

                          [facility]

 

                          Address                   [address]

[place]  [id number]



 

                          Phone                     [phone]







Care Team Providers







                    Care Team Member Name    Role                Phone

 

                    Bhupinder Louise    PCP                 Unavailable







Allergies and Adverse Reactions







                    Name                Reaction            Notes

 

                    NO KNOWN DRUG ALLERGIES                         







Plan of Treatment







             Planned Activity    Comments     Planned Date    Planned Time    Plan/Goal

 

             COMPLETE CBC W/AUTO DIFF WBC                 2013     12:00 AM      

 

             ASSAY OF LITHIUM                 2013     12:00 AM      

 

             METABOLIC PANEL TOTAL CA                 2013     12:00 AM      

 

             VITAMIN B-12                 2013     12:00 AM      

 

             ASSAY OF FOLIC ACID SERUM                 2013     12:00 AM      

 

             THER/PROPH/DIAG INJ SC/IM                 2013    12:00 AM      







Medications







                                        Active 

 

             Name         Start Date    Estimated Completion Date    SIG          Comments

 

                    syringe with needle (disp) 1 mL 25 gauge x 1" miscellaneous syringe    2011             

                          use for injection once a month     

 

                Latuda 20 mg oral tablet                                    take 1 tablet (20 mg) by oral route once daily with

 food (at least 350 calories)            

 

             pravastatin 40 mg oral tablet    3/30/2015                 TAKE 1 TABLET BY MOUTH DAILY     

 

                Namenda XR 28 mg oral capsule,sprinkle,ER 24hr    2015                       take 1 capsule (28

 mg) by oral route once daily            

 

                prednisone 10 mg oral tablet    12/3/2015                       take 2 tablets (20 mg) by oral route

 once daily for 4 days 1 tablet daily for 4 days 0.5 tablet daily for 4 days     









                                         

 

             Name         Start Date    Expiration Date    SIG          Comments

 

             Reglan 10mg    3/29/2010    2010    one ac and hs     

 

                Keflex 500 mg oral capsule    2010       10/1/2010       take 1 capsule (500 mg) by oral

 route every 6 hours for 10 days         

 

                Bactrim -160 mg oral tablet    2011       take 1 tablet by oral route

 every 12 hours for 7 days               

 

                triamcinolone acetonide 0.1 % topical cream    2011      apply a thin

 layer to the affected area(s) by topical route 2 times per day     

 

                sertraline 100 mg oral tablet    4/10/2012       5/10/2012       take 1.5 tablets by oral route

 daily for 30 days                       

 

                ergocalciferol (vitamin D2) 50,000 unit oral capsule    4/15/2013       2013       TAKE

 ONE CAPSULE BY MOUTH ONCE A WEEK        

 

                CYANOCOBALAM 1000MCGINJ 1000 milliliter    2013       INJECT 1ML INTRAMUSCULAR

 ONCE A MONTH                            

 

                pravastatin 40 mg oral tablet    3/25/2014       3/20/2015       TAKE ONE TABLET BY MOUTH EVERY

 DAY                                     

 

                          Zostavax (PF) 19,400 unit/0.65 mL subcutaneous suspension for reconstitution    3/23/2015

                    3/24/2015           inject 0.65 milliliter by subcutaneous route once     

 

                lithium carbonate 300 mg oral capsule    2015       take 1 capsule (300

 mg) by oral route 2 for 30 days         

 

                famciclovir 500 mg oral tablet    12/3/2015       12/10/2015      take 1 tablet (500 mg) by

 oral route every 8 hours for 7 days     









                                        Discontinued 

 

             Name         Start Date    Discontinued Date    SIG          Comments

 

                Tylenol 325 mg oral tablet                    2013        take 1 - 2 tablets (325 -650 mg) by oral

 route every 4-6 hours as needed         

 

                Calcium 600 + D(3) 600 mg(1,500mg) -400 unit oral tablet                    2011       take 1 tablet

 by oral route 2 times a day            no longer taking

 

                Vitamin B-12 1,000 mcg oral tablet extended release    2010       take 1

 tablet by oral route daily             no longer taking

 

                Antifungal (clotrimazole) 1 % topical cream    2010       apply to the 

affected and surrounding areas of skin by topical route 2 times per day morning 
and evening                              

 

                sertraline 100 mg oral tablet    5/10/2011       2011       take 2 tablets (200 mg) by 

oral route once daily                   discontinued by Dr. Serrano

 

                mirtazapine 15 mg oral tablet                    2011        take 1 tablet (15 mg) by oral route 

once daily before bedtime               Dr. Serrano

 

                mirtazapine 15 mg oral tablet                    2011        take 1 tablet (15 mg) by oral route 

once daily before bedtime               dc'd by Dr. Serrano

 

                Pristiq 50 mg oral tablet extended release 24 hr                    2013        take 1 tablet (50

 mg) by oral route once daily           Dr. Serrano

 

                Pristiq 50 mg oral tablet extended release 24 hr                    2013        take 1 tablet (50

 mg) by oral route once daily           dose updated

 

                Vitamin B-12 1,000 mcg/mL injection solution    2011        inject 1 milliliter

 (1,000 mcg) by intramuscular route once a month    on list already

 

                clotrimazole 1 % topical cream    2011        apply to the affected and surrounding

 areas of skin by topical route 2 times per day in the morning and evening     

 

                Vitamin D2 50,000 unit oral capsule    2011        take 1 capsule (50,000

 unit) by oral route once weekly        generic on list

 

                Pravachol 40 mg oral tablet    2012        take 1 tablet (40 mg) by oral 

route once daily for 90 days            generic on list

 

                lithium carbonate 300 mg oral capsule    2012        take 1 capsule by oral

 route daily                            dose updated

 

                Pristiq 100 mg oral tablet extended release 24 hr                    4/10/2012       take 1 and 1/2 

tablet (150 mg) by oral route once daily    Mental Health provider

 

                Pristiq 100 mg oral tablet extended release 24 hr                    4/10/2012       take 1 and 1/2 

tablet (150 mg) by oral route once daily    Discontinued by Dr Efrain Knight at [facility]

 

                hydroxyzine HCl 50 mg oral tablet    10/16/2014      2015       take 1 tablet (50 mg) 

by oral route at bedtime                 

 

                fluconazole 100 mg oral tablet    2015       12/3/2015       take 1 tablet (100 mg) by 

oral route once a week                   

 

                ketoconazole 2 % topical cream    2015       12/3/2015       apply to the affected area(s)

 by topical route 2 times per day        







Problem List







                    Description         Status              Onset

 

                    Artificial opening status; colostomy    Active               

 

                    Bipolar disorder, unspecified    Active               

 

                    Hyperlipidemia      Active               

 

                    Peritoneal Neoplasm, Malignant    Active               

 

                    Anemia, Pernicious    Active               

 

                    Arthritis unspecified    Active               

 

                    B12 deficiency      Active               







Vital Signs







      Date    Time    BP-Sys(mm[Hg]    BP-Lynn(mm[Hg])    HR(bpm)    RR(rpm)    Temp    WT    HT    HC    BMI

                    BSA                 BMI Percentile      O2 Sat(%)

 

        12/3/2015    9:50:00 AM    132 mmHg    70 mmHg    62 bpm    16 rpm    97.9 F    145 lbs    69 in

                          21.41 kg/m2    1.79 m2                   100 %

 

        2015    8:52:00 AM    132 mmHg    68 mmHg    52 bpm    20 rpm    97.8 F    141 lbs    69 in

                          20.8218 kg/m    1.7645 m                 100 %

 

        2015    3:25:00 PM    120 mmHg    62 mmHg    72 bpm    16 rpm    98.1 F    136 lbs    69 in

                          20.08 kg/m2    1.73 m2                   98 %

 

       3/23/2015    2:55:00 PM    130 mmHg    76 mmHg    68 bpm    18 rpm    97 F    140 lbs    69 in    

                20.6742 kg/m    1.7583 m                      98 %

 

        10/16/2014    11:11:00 AM    120 mmHg    66 mmHg    77 bpm    20 rpm    98 F    130 lbs    69 in

                          19.20 kg/m2    1.69 m2                   100 %

 

        2014    3:21:00 PM    130 mmHg    66 mmHg    63 bpm    18 rpm    97.2 F    160 lbs    69 in

                          23.6276 kg/m    1.8797 m                 99 %

 

        2013    10:35:00 AM    132 mmHg    70 mmHg    66 bpm    20 rpm    98.1 F    157 lbs    69 in

                          23.18 kg/m2    1.86 m2                    

 

        2013    1:29:00 PM    132 mmHg    70 mmHg    76 bpm    18 rpm    98.2 F    166 lbs    69 in 

                          24.5137 kg/m    1.9146 m                  

 

       2013    2:46:00 PM    128 mmHg    70 mmHg    76 bpm    16 rpm    98 F    160 lbs    69 in     

                23.63 kg/m2     1.88 m2                          

 

        2011    8:49:00 AM    128 mmHg    78 mmHg    70 bpm    18 rpm    97.9 F    164 lbs    69 in

                          24.2183 kg/m    1.903 m                  

 

     2011    1:31:00 PM    132 mmHg    68 mmHg    84 bpm         97 F    167 lbs                        

                                         

 

        2011    9:09:00 AM    128 mmHg    70 mmHg    72 bpm    18 rpm    98.2 F    163 lbs    64 in 

                          27.9786 kg/m    1.8272 m                  

 

       2011    10:01:00 AM    132 mmHg    70 mmHg    72 bpm    18 rpm    98.2 F    154 lbs             

                                                                 

 

       2011    2:47:00 PM    128 mmHg    70 mmHg    72 bpm    18 rpm    97.8 F    156 lbs             

                                                                 

 

       5/10/2011    3:16:00 PM    144 mmHg    80 mmHg    72 bpm    18 rpm    98.2 F    158 lbs             

                                                                 

 

        2011    10:11:00 AM    132 mmHg    70 mmHg    70 bpm    18 rpm    98.2 F    168 lbs    69 in

                          24.809 kg/m    1.9261 m                  

 

        4/15/2011    10:52:00 AM    110 mmHg    60 mmHg    75 bpm    16 rpm    97.5 F    172.375 lbs    

69 in                     25.46 kg/m2    1.95 m2                   100 %

 

        2011    11:43:00 AM    120 mmHg    82 mmHg    75 bpm    16 rpm    97.2 F    178.5 lbs    69

 in                       26.3596 kg/m    1.9854 m                 100 %

 

        10/15/2010    1:32:00 PM    120 mmHg    70 mmHg    80 bpm    18 rpm    96.6 F    177 lbs    69 in

                          26.14 kg/m2    1.98 m2                   100 %

 

        2010    3:50:00 PM    168 mmHg    100 mmHg    82 bpm    18 rpm    97.8 F    177.5 lbs    69

 in                       26.2119 kg/m    1.9798 m                 97 %

 

        2010    1:21:00 PM    140 mmHg    80 mmHg    59 bpm    16 rpm    97.6 F    173.25 lbs    69 

in                        25.58 kg/m2    1.96 m2                   100 %

 

        2010    3:02:00 PM    140 mmHg    80 mmHg    61 bpm    16 rpm    97.6 F    173.125 lbs    69

 in                       25.5658 kg/m    1.9553 m                 99 %

 

        2010    1:23:00 PM    130 mmHg    80 mmHg    66 bpm    16 rpm    96.8 F    173 lbs    69 in 

                          25.55 kg/m2    1.95 m2                   100 %

 

        2010    12:58:00 PM    130 mmHg    88 mmHg    75 bpm    16 rpm    98.4 F    172.25 lbs    69

 in                       25.4366 kg/m    1.9503 m                 100 %







Social History







                    Name                Description         Comments

 

                    denies alcohol use                         

 

                    denies smoking                           

 

                    Denies illicit substance abuse                         

 

                    retired                                 direct care

 

                    Single                                   

 

                    Exercises regularly                         

 

                    Attended some college                         

 

                    Tobacco             Never smoker         







History of Procedures







                    Date Ordered        Description         Order Status

 

                    2010 12:00 AM    COMPREHEN METABOLIC PANEL    Reviewed

 

                    2010 12:00 AM    COMPLETE CBC W/AUTO DIFF WBC    Reviewed

 

                    2010 12:00 AM    LIPID PANEL         Reviewed

 

                          2015 12:00 AM        B12 Injection, Up to 1000 Mcg NDC#8651-5041-48 [facility] 

                                        Reviewed

 

                    2011 12:00 AM    MAMMOGRAM SCREENING    Reviewed

 

                    2011 12:00 AM    CYTOPATH C/V THIN LAYER    Reviewed

 

                    2011 12:00 AM    B12 Injection 1 cc NDC#[address]-38    Reviewed

 

                    2015 12:00 AM    THER/PROPH/DIAG INJ SC/IM    Reviewed

 

                    2015 12:00 AM    B12 Injection, Up to 1000 Mcg NDC#1526-5233-63    Reviewed

 

                    2011 12:00 AM    THER/PROPH/DIAG INJ SC/IM    Reviewed

 

                    2011 12:00 AM    B12 Injection([facility]) Ndc#0936-8797-87-    Reviewed

 

                    2015 12:00 AM    THER/PROPH/DIAG INJ SC/IM    Reviewed

 

                    2015 12:00 AM    B12 Injection, Up to 1000 Mcg NDC#5552-3558-34    Reviewed

 

                    10/20/2011 12:00 AM    THER/PROPH/DIAG INJ SC/IM    Reviewed

 

                    10/20/2011 12:00 AM    B12 Injection([facility]ella) Ndc#1581-0078-16-    Reviewed

 

                    2016 12:00 AM    THER/PROPH/DIAG INJ SC/IM    Reviewed

 

                    2016 12:00 AM    B12 Injection, Up to 1000 Mcg NDC#0351-4206-63    Reviewed

 

                    3/14/2016 12:00 AM    VITAMIN B-12        Returned

 

                    3/15/2016 12:00 AM    THER/PROPH/DIAG INJ SC/IM    Reviewed

 

                    3/15/2016 12:00 AM    B12 Injection, Up to 1000 Mcg NDC#6440-9811-74    Reviewed

 

                    2011 12:00 AM    ***Immunization administration, Medicare flu    Reviewed

 

                    2011 12:00 AM    Fluzone ** MEDICARE Only **    Reviewed

 

                    2011 12:00 AM    THER/PROPH/DIAG INJ SC/IM    Reviewed

 

                    2011 12:00 AM    B12 Injection ([facility]) Ndc#9228-6969-81    Reviewed

 

                    2012 12:00 AM    THER/PROPH/DIAG INJ SC/IM    Reviewed

 

                    2012 12:00 AM    B12 Injection ([facility]) Ndc#7651-3793-25    Reviewed

 

                    2012 12:00 AM    THER/PROPH/DIAG INJ SC/IM    Reviewed

 

                    2012 12:00 AM    B12 Injection([facility]ella) Ndc#8570-3386-34-    Reviewed

 

                    5/3/2012 12:00 AM    THER/PROPH/DIAG INJ SC/IM    Reviewed

 

                    5/3/2012 12:00 AM    B12 Injection([facility]) Ndc#8273-9145-26-    Reviewed

 

                    2012 12:00 AM    IMMUNOTHERAPY INJECTIONS    Reviewed

 

                    2012 12:00 AM    B12 Injection([place]ella) Ndc#3052-8323-81-    Reviewed

 

                    2012 12:00 AM    THER/PROPH/DIAG INJ SC/IM    Reviewed

 

                    2012 12:00 AM    B12 Injection, Up to 1000 Mcg NDC#4395-7255-79    Reviewed

 

                    2012 12:00 AM    THER/PROPH/DIAG INJ SC/IM    Reviewed

 

                    2012 12:00 AM    B12 Injection, Up to 1000 Mcg NDC#5123-5191-95    Reviewed

 

                    2012 12:00 AM    THER/PROPH/DIAG INJ SC/IM    Reviewed

 

                    2012 12:00 AM    B12 Injection, Up to 1000 Mcg NDC#1203-4814-42    Reviewed

 

                    10/16/2012 12:00 AM    THER/PROPH/DIAG INJ SC/IM    Reviewed

 

                    10/16/2012 12:00 AM    B12 Injection, Up to 1000 Mcg NDC#5883-7118-46    Reviewed

 

                    2010 12:00 AM    COMPREHEN METABOLIC PANEL    Reviewed

 

                    2010 12:00 AM    COMPLETE CBC W/AUTO DIFF WBC    Reviewed

 

                    2010 12:00 AM    LIPID PANEL         Reviewed

 

                    2013 12:00 AM    Flu Injection 3 Years And Above NDC# [address]-31  [facility]    Reviewed



 

                    2013 12:00 AM    COMPLETE CBC W/AUTO DIFF WBC    Reviewed

 

                    2013 12:00 AM    ASSAY OF LITHIUM    Reviewed

 

                    2013 12:00 AM    METABOLIC PANEL TOTAL CA    Reviewed

 

                    4/3/2013 12:00 AM    THER/PROPH/DIAG INJ SC/IM    Reviewed

 

                    4/3/2013 12:00 AM    B12 Injection, Up to 1000 Mcg NDC#6251-4754-39    Reviewed

 

                    2013 12:00 AM    THER/PROPH/DIAG INJ SC/IM    Reviewed

 

                    2013 12:00 AM    B12 Injection, Up to 1000 Mcg NDC#3897-5390-68    Reviewed

 

                    2013 12:00 AM    THER/PROPH/DIAG INJ SC/IM    Reviewed

 

                    2013 12:00 AM    B12 Injection, Up to 1000 Mcg NDC#1724-1336-80    Reviewed

 

                    2013 12:00 AM    LIPID PANEL         Reviewed

 

                    2013 12:00 AM    VITAMIN D 25 HYDROXY    Reviewed

 

                    2013 12:00 AM    THER/PROPH/DIAG INJ SC/IM    Reviewed

 

                    3/6/2014 12:00 AM    THER/PROPH/DIAG INJ SC/IM    Reviewed

 

                    2014 12:00 AM    THER/PROPH/DIAG INJ SC/IM    Reviewed

 

                    2014 12:00 AM    B12 Injection, Up to 1000 Mcg NDC#6779-6307-21    Reviewed

 

                    2010 12:00 AM    SKIN FUNGI CULTURE    Reviewed

 

                    10/9/2010 12:00 AM    COMPREHEN METABOLIC PANEL    Reviewed

 

                    10/9/2010 12:00 AM    LIPID PANEL         Reviewed

 

                    2010 12:00 AM    THER/PROPH/DIAG INJ SC/IM    Reviewed

 

                    2010 12:00 AM    B12 Injection Ndc#[address]-45 ([facility])    Reviewed

 

                    2010 12:00 AM    THER/PROPH/DIAG INJ SC/IM    Reviewed

 

                    2010 12:00 AM    Kenalog 40 Mg Im-Ndc#[address]-78 ([facility])    Reviewed

 

                    10/15/2010 12:00 AM    FLU VACCINE 3 YRS & > IM    Reviewed

 

                    10/15/2010 12:00 AM    Admin.Of M/C Cov.Vaccine-Flu Vacc.    Reviewed

 

                    1/15/2011 12:00 AM    COMPLETE CBC W/AUTO DIFF WBC    Reviewed

 

                    1/15/2011 12:00 AM    COMPREHEN METABOLIC PANEL    Reviewed

 

                    1/15/2011 12:00 AM    LIPID PANEL         Reviewed

 

                    2014 12:00 AM    MAMMOGRAM SCREENING    Reviewed

 

                    2014 12:00 AM    Screening mammography, bilateral    Reviewed

 

                    7/10/2014 12:00 AM    THER/PROPH/DIAG INJ SC/IM    Reviewed

 

                    7/10/2014 12:00 AM    B12 Injection, Up to 1000 Mcg NDC#3101-1354-85    Reviewed

 

                    2011 12:00 AM    COMPLETE CBC W/AUTO DIFF WBC    Reviewed

 

                    2011 12:00 AM    COMPREHEN METABOLIC PANEL    Reviewed

 

                    2011 12:00 AM    LIPID PANEL         Reviewed

 

                    2014 12:00 AM    B12 Injection, Up to 1000 Mcg NDC#4645-0470-59    Reviewed

 

                    10/19/2014 12:00 AM    MAMMOGRAM SCREENING    Reviewed

 

                    10/19/2014 12:00 AM    Screening mammography, bilateral    Reviewed

 

                    10/16/2014 12:00 AM    COMPLETE CBC W/AUTO DIFF WBC    Reviewed

 

                    10/16/2014 12:00 AM    COMPREHEN METABOLIC PANEL    Reviewed

 

                    10/16/2014 12:00 AM    IMMUNOASSAY TUMOR     Reviewed

 

                    10/16/2014 12:00 AM    LIPID PANEL         Reviewed

 

                    10/16/2014 12:00 AM    ASSAY OF LITHIUM    Reviewed

 

                    10/16/2014 12:00 AM    MAMMOGRAM SCREENING    Reviewed

 

                    2011 12:00 AM    ASSAY OF PARATHORMONE    Reviewed

 

                    2011 12:00 AM    VITAMIN D 25 HYDROXY    Reviewed

 

                    2011 12:00 AM    ASSAY OF LITHIUM    Reviewed

 

                    2011 12:00 AM    METABOLIC PANEL TOTAL CA    Reviewed

 

                    2011 12:00 AM    CT HEAD/BRAIN W/O & W/DYE    Reviewed

 

                    3/23/2015 12:00 AM    PNEUMOCOCCAL VACC 13 GLENDY IM    Reviewed

 

                    3/23/2015 12:00 AM    Vitamin B12 injection    Reviewed

 

                    2011 12:00 AM    ASSAY OF LITHIUM    Reviewed

 

                    2011 12:00 AM    B12 Injection Ndc#[address]-63  [facility]    Reviewed

 

                    2015 12:00 AM    THER/PROPH/DIAG INJ SC/IM    Reviewed

 

                    2015 12:00 AM    B12 Injection, Up to 1000 Mcg NDC#8836-0898-06    Reviewed

 

                    2015 12:00 AM    COMPLETE CBC W/AUTO DIFF WBC    Reviewed

 

                    2015 12:00 AM    COMPREHEN METABOLIC PANEL    Reviewed

 

                    2015 12:00 AM    LIPID PANEL         Reviewed

 

                    2015 12:00 AM    ASSAY OF LITHIUM    Reviewed

 

                    2011 12:00 AM    VIT D 1 25-DIHYDROXY    Reviewed

 

                    2011 12:00 AM    VITAMIN B-12        Reviewed

 

                    2015 12:00 AM    B12 Injection, Up to 1000 Mcg NDC#4594-8305-44    Reviewed

 

                    2015 12:00 AM    THER/PROPH/DIAG INJ SC/IM    Reviewed

 

                    2015 12:00 AM    B12 Injection, Up to 1000 Mcg NDC#2564-2518-71    Reviewed

 

                    2011 12:00 AM    THER/PROPH/DIAG INJ SC/IM    Reviewed

 

                    2011 12:00 AM    B12 Injection ([facility]) Ndc#1180-5356-32    Reviewed

 

                    2015 12:00 AM    THER/PROPH/DIAG INJ SC/IM    Reviewed

 

                    2015 12:00 AM    B12 Injection, Up to 1000 Mcg NDC#6731-0009-65    Reviewed







Results Summary







                          Data and Description      Results

 

                          2004 12:00 AM        Colonoscopy-Women and Men over 50 Normal 

 

                          2008 12:00 AM         Pap Smear Declined 

 

                          10/7/2009 12:00 AM        Cholest Cry Stone Ql .0 %LDLc SerPl-mCnc 123.0 mg/dLHDLc

 SerPl-mCnc 34.0 mg/dLTrigl SerPl-mCnc 190.0 mg/dLGlucose SerPl-mCnc 78.0 mg/dL

 

                          2009 12:00 AM        Mammogram -Women over 40 Normal HIV1+2 Ab Ser Ql no risk 

 

                          2010 8:47 AM         Dexa Bone Scan Refused Aspirin reccommended Contraindication 



 

                          2010 8:48 AM         Depression Done 

 

                          2010 12:00 AM         Foot Exam-Diabetic Done 

 

                          2010 12:00 AM         Cholest Cry Stone Ql .0 %LDLc SerPl-mCnc 126.0 mg/dLGlucose

 SerPl-mCnc 102.0 mg/dL

 

                          2010 8:45 AM          TRIGLYCERIDES 122.0 mg/dLCHOLESTEROL 186.0 mg/dLHDL 36.0 mg/dLLDL

 (CALC) 126.0 mg/dLGLUCOSE 102.0 mg/dLSODIUM 143.0 mmol/LPOTASSIUM 3.70 
mmol/LCHLORIDE 111.0 mmol/LCO2 23.0 mmol/LBUN 10.0 mg/dLCREATININE 0.80 
mg/dLSGOT/AST 12.0 IU/LSGPT/ALT 11.0 IU/LALK PHOS 65.0 IU/LTOTAL PROTEIN 7.20 
g/dLALBUMIN 3.90 g/dLTOTAL BILI 0.50 mg/dLCALCIUM 10.20 mg/dLeGFR >60 
mL/min/1.73 m2WBC 5.7 RBC 3.26 HGB 10.60 g/dLHCT 31.70 %MCV 97.0 fLMCH 32.50 
pgMCHC 33.40 g/dLRDW CV 13.30 %MPV 9.70 fLPLT 287 %NEUT 62.90 %%LYMP 21.80 
%%MONO 9.90 %%EOS 5.0 %%BASO 0.40 %#NEUT 3.56 #LYMP 1.23 #MONO 0.56 #EOS 0.28 
#BASO 0.02 

 

                          2010 12:00 AM        Glucose SerPl-mCnc 96.0 mg/dLCholest Cry Stone Ql .0 %LDLc

 SerPl-mCnc 146.0 mg/dL

 

                          2010 8:26 AM         TRIGLYCERIDES 106.0 mg/dLCHOLESTEROL 199.0 mg/dLHDL 32.0 mg/dLLDL

 (CALC) 146.0 mg/dLGLUCOSE 96.0 mg/dLSODIUM 143.0 mmol/LPOTASSIUM 4.0 
mmol/LCHLORIDE 113.0 mmol/LCO2 24.0 mmol/LBUN 13.0 mg/dLCREATININE 1.0 
mg/dLSGOT/AST 11.0 IU/LSGPT/ALT 6.0 IU/LALK PHOS 56.0 IU/LTOTAL PROTEIN 6.60 
g/dLALBUMIN 3.80 g/dLTOTAL BILI 0.50 mg/dLCALCIUM 9.30 mg/dLeGFR 57 

 

                          10/6/2010 12:00 AM        Cholest Cry Stone Ql .0 %LDLc SerPl-mCnc 111.0 mg/dLGlucose

 SerPl-mCnc 81.0 mg/dL

 

                          10/6/2010 2:45 PM         TRIGLYCERIDES 123.0 mg/dLCHOLESTEROL 178.0 mg/dLHDL 42.0 mg/dLLDL

 (CALC) 111.0 mg/dLGLUCOSE 81.0 mg/dLSODIUM 139.0 mmol/LPOTASSIUM 4.10 
mmol/LCHLORIDE 106.0 mmol/LCO2 24.0 mmol/LBUN 13.0 mg/dLCREATININE 0.90 
mg/dLSGOT/AST 13.0 IU/LSGPT/ALT 11.0 IU/LALK PHOS 61.0 IU/LTOTAL PROTEIN 7.10 
g/dLALBUMIN 3.90 g/dLTOTAL BILI 0.30 mg/dLCALCIUM 9.30 mg/dLeGFR >60 mL/min/1.73
 m2WBC 6.9 RBC 3.59 HGB 11.50 g/dLHCT 35.30 %MCV 98.0 fLMCH 32.0 pgMCHC 32.60 
g/dLRDW CV 12.90 %MPV 9.90 fLPLT 311 %NEUT 64.90 %%LYMP 22.50 %%MONO 7.20 %%EOS 
5.10 %%BASO 0.30 %#NEUT 4.45 #LYMP 1.54 #MONO 0.49 #EOS 0.35 #BASO 0.02 

 

                          2011 12:00 AM         Mammogram -Women over 40 Ordered 

 

                          2011 10:25 AM        TRIGLYCERIDES 111.0 mg/dLCHOLESTEROL 195.0 mg/dLHDL 43.0 mg/dLLDL

 (CALC) 130.0 mg/dLWBC 5.3 RBC 3.76 HGB 12.0 g/dLHCT 37.80 %.0 fLMCH 
31.90 pgMCHC 31.70 g/dLRDW CV 13.0 %MPV 9.70 fLPLT 259 %NEUT 69.0 %%LYMP 17.60 
%%MONO 8.30 %%EOS 4.70 %%BASO 0.40 %#NEUT 3.63 #LYMP 0.93 #MONO 0.44 #EOS 0.25 
#BASO 0.02 GLUCOSE 102.0 mg/dLSODIUM 146.0 mmol/LPOTASSIUM 4.20 mmol/LCHLORIDE 
113.0 mmol/LCO2 23.0 mmol/LBUN 15.0 mg/dLCREATININE 1.0 mg/dLSGOT/AST 12.0 
IU/LSGPT/ALT 17.0 IU/LALK PHOS 60.0 IU/LTOTAL PROTEIN 6.90 g/dLALBUMIN 4.20 
g/dLTOTAL BILI 0.40 mg/dLCALCIUM 9.70 mg/dLeGFR 57 

 

                          2011 11:49 AM        Cholest Cry Stone Ql .0 %LDLc SerPl-mCnc 130.0 mg/dLHDLc

 SerPl-mCnc 43.0 mg/dLTrigl SerPl-mCnc 111.0 mg/dLGlucose SerPl-mCnc 102.0 mg/dL

 

                          2011 11:52 AM        Pap Smear Declined 

 

                          2011 11:28 AM        Lithium 2.080 mmol/LGLUCOSE 102.0 mg/dLSODIUM 135.0 mmol/LPOTASSIUM

 3.90 mmol/LCHLORIDE 106.0 mmol/LCO2 21.0 mmol/LBUN 12.0 mg/dLCREATININE 1.30 
mg/dLCALCIUM 10.70 mg/dLeGFR 42 

 

                          2011 8:58 AM          Lithium 0.690 mmol/L

 

                          2011 2:38 PM         VITAMIN B12 3483.0 pg/mL

 

                          2013 3:35 PM          WBC 5.1 RBC 3.73 HGB 11.70 g/dLHCT 36.40 %MCV 98.0 fLMCH 31.40

 pgMCHC 32.10 g/dLRDW CV 13.10 %MPV 9.80 fLPLT 224 %NEUT 66.80 %%LYMP 19.10 
%%MONO 9.0 %%EOS 4.90 %%BASO 0.20 %#NEUT 3.42 #LYMP 0.98 #MONO 0.46 #EOS 0.25 
#BASO 0.01 GLUCOSE 88.0 mg/dLSODIUM 141.0 mmol/LPOTASSIUM 4.10 mmol/LCHLORIDE 
110.0 mmol/LCO2 22.0 mmol/LBUN 22.0 mg/dLCREATININE 1.10 mg/dLCALCIUM 9.80 
mg/dLeGFR 50 Lithium 0.760 mmol/L

 

                          2013 11:02 AM        TRIGLYCERIDES 106.0 mg/dLCHOLESTEROL 181.0 mg/dLHDL 46.0 mg/dLLDL

 (CALC) 114.0 mg/dLVITAMIN D 41.10 ng/mL

 

                          10/17/2014 10:10 AM       WBC 5.0 RBC 3.66 HGB 11.60 g/dLHCT 36.80 %.0 fLMCH 31.70

 pgMCHC 31.50 g/dLRDW CV 13.50 %MPV 10.10 fLPLT 209 %NEUT 69.20 %%LYMP 21.0 
%%MONO 6.40 %%EOS 3.20 %%BASO 0.20 %#NEUT 3.46 #LYMP 1.05 #MONO 0.32 #EOS 0.16 
#BASO 0.01 GLUCOSE 100.0 mg/dLSODIUM 148.0 mmol/LPOTASSIUM 3.90 mmol/LCHLORIDE 
114.0 mmol/LCO2 26.0 mmol/LBUN 12.0 mg/dLCREATININE 1.20 mg/dLSGOT/AST 9.0 
IU/LSGPT/ALT <6 IU/LALK PHOS 82.0 IU/LTOTAL PROTEIN 6.90 g/dLALBUMIN 4.0 
g/dLTOTAL BILI 0.40 mg/dLCALCIUM 10.50 mg/dLeGFR 45 TRIGLYCERIDES 96.0 
mg/dLCHOLESTEROL 155.0 mg/dLHDL 38.0 mg/dLLDL (CALC) 98.0 mg/dLLithium 0.850 
mmol/LCancer Antigen (CA) 125 8.30 U/mL

 

                          2015 10:25 AM        Lithium 0.790 mmol/LWBC 4.8 RBC 3.44 HGB 11.0 g/dLHCT 35.20 

%.0 fLMCH 32.0 pgMCHC 31.30 g/dLRDW CV 14.0 %MPV 9.30 fLPLT 210 %NEUT 
70.80 %%LYMP 17.20 %%MONO 8.10 %%EOS 3.50 %%BASO 0.40 %#NEUT 3.41 #LYMP 0.83 
#MONO 0.39 #EOS 0.17 #BASO 0.02 TRIGLYCERIDES 107.0 mg/dLCHOLESTEROL 174.0 
mg/dLHDL 43.0 mg/dLLDL (CALC) 110.0 mg/dLGLUCOSE 90.0 mg/dLSODIUM 145.0 
mmol/LPOTASSIUM 3.80 mmol/LCHLORIDE 115.0 mmol/LCO2 24.0 mmol/LBUN 17.0 mg
/dLCREATININE 1.30 mg/dLSGOT/AST 18.0 IU/LSGPT/ALT 17.0 IU/LALK PHOS 56.0 
IU/LTOTAL PROTEIN 6.70 g/dLALBUMIN 3.90 g/dLTOTAL BILI 0.40 mg/dLCALCIUM 9.80 
mg/dLeGFR 41 

 

                          2015 8:50 AM        WBC 5.8 RBC 3.29 HGB 10.70 g/dLHCT 34.0 %.0 fLMCH 32.50

 pgMCHC 31.50 g/dLRDW CV 13.60 %MPV 9.60 fLPLT 223 %NEUT 69.60 %%LYMP 18.90 
%%MONO 8.50 %%EOS 2.80 %%BASO 0.20 %#NEUT 4.03 #LYMP 1.09 #MONO 0.49 #EOS 0.16 
#BASO 0.01 Lithium 0.620 mmol/LGLUCOSE 83.0 mg/dLSODIUM 139.0 mmol/LPOTASSIUM 
3.90 mmol/LCHLORIDE 109.0 mmol/LCO2 22.0 mmol/LBUN 19.0 mg/dLCREATININE 1.40 
mg/dLSGOT/AST 19.0 IU/LSGPT/ALT 21.0 IU/LALK PHOS 55.0 IU/LTOTAL PROTEIN 6.50 
g/dLALBUMIN 3.90 g/dLTOTAL BILI 0.50 mg/dLCALCIUM 9.60 mg/dLeGFR 38 
TRIGLYCERIDES 121.0 mg/dLCHOLESTEROL 192.0 mg/dLHDL 51.0 mg/dLLDL 121.0 mg/dLTSH
 1.210 uIU/mL







History Of Immunizations







       Name    Date Admin    Mfg Name    Mfg Code    Trade Name    Lot#    Route    Inj    Vis Given    Vis

 Pub                                    CVX

 

        Influenza    2008    Not Entered    NE      Not Entered            Not Entered    Not Entered

                    1            999

 

          Pneumococcal    2008    [employer]    MSD       Pneumovax 23              Intramuscular

                Not Entered     1        999

 

           Influenza    10/15/2010    [employer]    NOV        Fluvirin > 12 Years    

[id number]     Intramuscular    Left Deltoid    10/15/2010    2009    999

 

          Pneumococcal    3/23/2015    [facility]    [facility]       Prevnar 13    [id number]    Intramuscular

                Right Gluteous Medius    3/23/2015       2013       109







History of Past Illness







                    Name                Date of Onset       Comments

 

                    Peritoneal Neoplasm, Malignant                         

 

                    Hyperlipidemia                           

 

                    Bipolar disorder, unspecified                         

 

                    Artificial opening status; colostomy                         

 

                    B12 deficiency                           

 

                    Anemia, Pernicious                         

 

                    Arthritis unspecified                         

 

                    cervical cancer                          

 

                    Artificial opening status; colostomy    2010  1:10PM     

 

                    Bipolar disorder, unspecified    2010  1:10PM     

 

                    Hyperlipidemia      2010  1:10PM     

 

                    Anemia, Pernicious    2010  1:10PM     

 

                    Postoperative Follow-Up    2010  1:55PM     

 

                    Postoperative Follow-Up    Mar  8 2010 10:57AM     

 

                    Artificial opening status; colostomy    Mar  8 2010  1:19PM     

 

                    Peritoneal Neoplasm, Malignant    Mar  8 2010  1:19PM     

 

                    Artificial opening status; colostomy    2010  1:40PM     

 

                    Hyperlipidemia      2010  1:40PM     

 

                    Anemia, Pernicious    2010  1:40PM     

 

                    Peritoneal Neoplasm, Malignant    2010  1:40PM     

 

                    Arthritis unspecified    2010  1:40PM     

 

                    Anemia of Chronic Illness    2010  1:40PM     

 

                    Tinea corporis      2010  3:17PM     

 

                    Bipolar disorder, unspecified    2010  1:33PM     

 

                    Hyperlipidemia      2010  1:33PM     

 

                    Anemia, Pernicious    2010  1:33PM     

 

                    Peritoneal Neoplasm, Malignant    2010  1:33PM     

 

                    B12 deficiency      2010  1:33PM     

 

                    Ethmoidal Sinusitis, Acute    Sep 21 2010  3:53PM     

 

                    Wheezing            Sep 21 2010  3:53PM     

 

                    Flu                 Oct 15 2010  1:40PM     

 

                    Bipolar disorder, unspecified    Oct 15 2010  1:42PM     

 

                    Hyperlipidemia      Oct 15 2010  1:42PM     

 

                    Anemia, Pernicious    Oct 15 2010  1:42PM     

 

                    Peritoneal Neoplasm, Malignant    Oct 15 2010  1:42PM     

 

                    Bipolar disorder, unspecified    2011 12:01PM     

 

                    Hyperlipidemia      2011 12:01PM     

 

                    Anemia, Pernicious    2011 12:01PM     

 

                    Peritoneal Neoplasm, Malignant    2011 12:01PM     

 

                    Bipolar disorder, unspecified    Apr 15 2011 10:55AM     

 

                    Major Depression    2011 10:11AM     

 

                    Bipolar Disorder    2011 10:11AM     

 

                    Cancer              May 10 2011  4:16PM     

 

                    Major Depression    May 10 2011  3:16PM     

 

                    Bipolar Disorder    May 10 2011  3:16PM     

 

                    Hypercalcemia       May 23 2011  2:47PM     

 

                    Bipolar disorder, unspecified    May 23 2011  2:47PM     

 

                    Colon Cancer, Personal History    May 23 2011  2:47PM     

 

                    Bipolar Disorder    May 31 2011  4:39PM     

 

                    Depressive Disorder    2011 10:01AM     

 

                    Vitamin B12 deficiency    2011 10:01AM     

 

                    Vitamin D Deficiency    2011  5:07PM     

 

                    Anemia, Vitamin B12 Deficiency    2011  5:07PM     

 

                    B12 deficiency      2011  3:56PM     

 

                    Routine gynecological examination    Aug  4 2011  9:08AM     

 

                    Screening Examination for Breast Cancer    Aug  4 2011  9:08AM     

 

                    Tinea Corporis      Aug  4 2011  9:08AM     

 

                    Depressive Disorder    Sep 23 2011  8:47AM     

 

                    Contact Dermatitis    Sep 23 2011  8:47AM     

 

                    Anemia, Pernicious    Sep 23 2011  8:47AM     

 

                    B12 deficiency      Sep 23 2011  8:47AM     

 

                    B12 deficiency      Sep 27 2011  2:58PM     

 

                    B12 deficiency      Oct 20 2011  2:34PM     

 

                    Flu                 Dec  9 2011  3:16PM     

 

                    B12 deficiency      Dec  9 2011  3:17PM     

 

                    B12 deficiency      2012  4:52PM     

 

                    B12 deficiency      Feb 2012 11:10AM     

 

                    B12 deficiency      2012  3:37PM     

 

                    B12 deficiency      May  3 2012  4:10PM     

 

                    B12 deficiency      2012  2:54PM     

 

                    B12 deficiency      2012 11:23AM     

 

                    B12 deficiency      Aug  9 2012  2:08PM     

 

                    B12 deficiency      Sep  6 2012  4:36PM     

 

                    B12 deficiency      Oct 16 2012 10:23AM     

 

                    Flu                 Feb  2013  3:11PM     

 

                    Bipolar disorder, unspecified    Feb  2013  2:48PM     

 

                    Anemia, Pernicious    Feb  2013  2:48PM     

 

                    B12 deficiency      Feb  2013  2:48PM     

 

                    Extrapyramidal abnormal movement disorder    Feb  2013  2:48PM     

 

                    B12 deficiency      Apr  3 2013 12:03PM     

 

                    Bipolar disorder, unspecified    May  7 2013  1:31PM     

 

                    Anemia, Pernicious    May  7 2013  1:31PM     

 

                    B12 deficiency      May  7 2013  1:31PM     

 

                    Extrapyramidal abnormal movement disorder    May  7 2013  1:31PM     

 

                    B12 deficiency      2013  3:42PM     

 

                    B12 deficiency      2013  1:31PM     

 

                    Hyperlipidemia      Aug  7 2013 10:37AM     

 

                    Vitamin D Deficiency    Aug  7 2013 10:37AM     

 

                    Bipolar disorder, unspecified    Aug  7 2013 10:37AM     

 

                    Anemia, Pernicious    Aug  7 2013 10:37AM     

 

                    B12 deficiency      Aug  7 2013 10:37AM     

 

                    B12 deficiency      Sep 25 2013 11:15AM     

 

                    B12 deficiency      Dec 11 2013  3:16PM     

 

                    B12 deficiency      Mar  6 2014  1:48PM     

 

                    B12 deficiency      May 21 2014  3:17PM     

 

                    Screening Examination for Breast Cancer    2014  3:23PM     

 

                    Periumbilical abdominal pain    2014  3:23PM     

 

                    B12 deficiency      Jul 10 2014  2:52PM     

 

                    Anemia, Vitamin B12 Deficiency    Aug 13 2014  4:50PM     

 

                    Bipolar disorder    Oct 16 2014 11:13AM     

 

                    Hyperlipidemia      Oct 16 2014 11:13AM     

 

                    Anemia, Pernicious    Oct 16 2014 11:13AM     

 

                    Peritoneal Neoplasm, Malignant    Oct 16 2014 11:13AM     

 

                    Screening breast examination    Oct 16 2014 11:13AM     

 

                    Weight loss         Oct 16 2014 11:13AM     

 

                    Anemia, Pernicious    Mar 23 2015  2:57PM     

 

                    B12 deficiency      Mar 23 2015  2:57PM     

 

                    Need for Prevnar vaccine    Mar 23 2015  2:57PM     

 

                    Bipolar disorder    Mar 23 2015  2:57PM     

 

                    Hyperlipidemia      Mar 23 2015  2:57PM     

 

                    Anemia, Pernicious    Mar 23 2015  2:57PM     

 

                    Peritoneal Neoplasm, Malignant    Mar 23 2015  2:57PM     

 

                    B12 deficiency      May  4 2015  4:48PM     

 

                    Hyperlipidemia      May 13 2015  9:56AM     

 

                    Anemia              May 13 2015  9:56AM     

 

                    Bipolar disorder    May 13 2015  9:56AM     

 

                    Bipolar disorder    May 14 2015  3:27PM     

 

                    Hyperlipidemia      May 14 2015  3:27PM     

 

                    Anemia, Pernicious    May 14 2015  3:27PM     

 

                    Peritoneal Neoplasm, Malignant    May 14 2015  3:27PM     

 

                    B12 deficiency      2015  2:20PM     

 

                    B12 deficiency      2015 11:34AM     

 

                    B12 deficiency      Aug 18 2015  9:06AM     

 

                    Tinea Corporis      Sep 18 2015  8:54AM     

 

                    B12 deficiency      Sep 18 2015  8:54AM     

 

                    B12 deficiency      2015 10:28AM     

 

                    Herpes zoster without complication    Dec  3 2015  9:52AM     

 

                    B12 deficiency      Dec 23 2015 11:21AM     

 

                    B12 deficiency      2016  4:51PM     

 

                    Vitamin B 12 deficiency    Mar 14 2016  5:35PM     

 

                    B12 deficiency      Mar 15 2016 12:14PM     







Payers







           Insurance Name    Company Name    Plan Name    Plan Number    Policy Number    Policy Group

 Number                                 Start Date

 

                    Medicare Part A    Medicare Part A              [id number]              2006



 

                          [employer]    [employer]                 [id number]

                                                    

 

                    Medicare Part B    Medicare Of [place]              [id number]              2006

 

                          [employer]    [employer]                 50 percent

                                                    2009

 

                    [facility]              [id number]              N/A

 

                    Medicare Part A    Medicare Part A              [id number]              N/A







History of Encounters







                    Visit Date          Visit Type          Provider

 

                    3/15/2016           Nurse visit         Bhupinder Louise DO

 

                    2016            Nurse visit         Bhupinder Louise DO

 

                    2015          Nurse visit         Bhupinder Louise DO

 

                    12/3/2015           Office visit        Bhupinder Louise DO

 

                    2015          Nurse visit         Bhupinder Louise DO

 

                    2015           Office visit        Bhupinder Louise DO

 

                    2015           Nurse visit         Bhupinder Louise DO

 

                    2015            Nurse visit         Bhupinder Louise DO

 

                    2015            Nurse visit         Bhupinder Louise DO

 

                    2015           Office visit        Bhupinder Louise DO

 

                    2015            Nurse visit         Bhupinder Nealte DO

 

                    3/23/2015           Office visit        Bhupinder Aspen DO

 

                    10/16/2014          Office visit        Bhupinder Aspen DO

 

                    2014           Nurse visit         Radha Ontiveros APRN

 

                    7/10/2014           Nurse visit         Bhupinder Aspen DO

 

                    2014           Office visit        Bhupinder Aspen DO

 

                    2014           Nurse visit         Bhupinder Aspen DO

 

                    3/6/2014            Nurse visit         Bhupinder Aspen DO

 

                    2014            Yasmine Lopez MD

 

                    2013          Nurse visit         Bhupinder Aspen DO

 

                    2013           Nurse visit         Bhupinder Aspen DO

 

                    2013            Office visit        Bhupinder Aspen DO

 

                    2013            Nurse visit         Bhupinder Aspen DO

 

                    2013            Nurse visit         Bhupinder Aspen DO

 

                    2013            Office visit        Bhupinder Aspen DO

 

                    4/3/2013            Nurse visit         Bhupinder Aspen DO

 

                    2013            Office visit        Bhupinder Aspen DO

 

                    10/16/2012          Nurse visit         Bhupinder Aspen DO

 

                    2012            Nurse visit         Bhupinder Aspen DO

 

                    2012            Voided              Bhupinder Asepn DO

 

                    2012            Nurse visit         Bhupinder Aspen DO

 

                    2012            Nurse visit         Bhupinder Aspen DO

 

                    2012           Nurse visit         Bhupinder Aspen DO

 

                    5/3/2012            Nurse visit         Bhupinder Aspen DO

 

                    2012           Nurse visit         Bhupinder Aspen DO

 

                    2012           Nurse visit         Bhupinder Aspen DO

 

                    2012           Nurse visit         Bhupinder Aspen DO

 

                    2011           Nurse visit         Bhupinder Aspen DO

 

                    10/20/2011          Nurse visit         Bhupinder Aspen DO

 

                    2011           Office visit        Bhupinder Aspen DO

 

                    2011           Nurse visit         Radha Ontiveros APRN

 

                    2011            Office visit        Bhupinder Aspen DO

 

                    2011           Nurse visit         Bhupinder Aspen DO

 

                    2011            Office visit        Bhupinder Aspen DO

 

                    2011           Office visit        Bhupinder Aspen DO

 

                    5/10/2011           Office visit        Bhupinder Aspen DO

 

                    2011           Office visit        Bhupinder Aspen DO

 

                    4/15/2011           Office visit        Devin Angel DO

 

                    2011           Office visit        Devin Angel DO

 

                    10/15/2010          Office visit        Devin Angel DO

 

                    2010           Office visit        Devin Angel DO

 

                    2010            Office visit        Devin Angel DO

 

                    2010           Office visit        Devin Angel DO

 

                    2010            Office visit        Devin Angel DO

 

                    3/8/2010            Office visit        Devin Masterson MD

 

                    2010            Surgery             Devin Masterson MD

 

                    2010            Office visit        Devin Angel DO

 

                    2010           Surgery             Devin Masterson MD

 

                    2010           [facility]            Devin Masterson MD

 

                    2010           [facility]            Devin Msaterson MD

 

                    10/22/2009          Office visit        Devin Angel DO

## 2019-06-26 NOTE — XMS REPORT
MU2 Ambulatory Summary

                             Created on: 2015



Pauline Gan

External Reference #: [id number]

: 1950

Sex: Female



Demographics







                          Address                   [address]

[place]  [address]

 

                          Home Phone                [phone]

 

                          Preferred Language        English

 

                          Marital Status            Legally 

 

                          Mandaeism Affiliation     Unknown

 

                          Race                      White

 

                          Ethnic Group              Not  or 





Author







                          Bhupinder Aguilar

 

                          [facility]

 

                          Address                   [address]

[place]  [id number]



 

                          Phone                     [phone]







Care Team Providers







                    Care Team Member Name    Role                Phone

 

                    Bhupinder Louise    PCP                 Unavailable







Allergies and Adverse Reactions







                    Name                Reaction            Notes

 

                    NO KNOWN DRUG ALLERGIES                         







Plan of Treatment







             Planned Activity    Comments     Planned Date    Planned Time    Plan/Goal

 

             COMPLETE CBC W/AUTO DIFF WBC                 2013     12:00 AM      

 

             ASSAY OF LITHIUM                 2013     12:00 AM      

 

             METABOLIC PANEL TOTAL CA                 2013     12:00 AM      

 

             VITAMIN B-12                 2013     12:00 AM      

 

             ASSAY OF FOLIC ACID SERUM                 2013     12:00 AM      

 

             THER/PROPH/DIAG INJ SC/IM                 2013    12:00 AM      







Medications







                                        Active 

 

             Name         Start Date    Estimated Completion Date    SIG          Comments

 

                syringe with needle (disp) 1 mL 25 X 1" miscellaneous syringe    2011                        use 

for injection once a month               

 

                Latuda 20 mg oral tablet                                    take 1 tablet (20 mg) by oral route once daily with

 food (at least 350 calories)            

 

             pravastatin 40 mg oral tablet    3/30/2015                 TAKE 1 TABLET BY MOUTH DAILY     

 

                Namenda XR 28 mg oral capsule,sprinkle,ER 24hr    2015                       take 1 capsule (28

 mg) by oral route once daily            

 

                prednisone 10 mg oral tablet    12/3/2015                       take 2 tablets (20 mg) by oral route

 once daily for 4 days 1 tablet daily for 4 days 0.5 tablet daily for 4 days     









                                         

 

             Name         Start Date    Expiration Date    SIG          Comments

 

             Reglan 10mg    3/29/2010    2010    one ac and hs     

 

                Keflex 500 mg oral capsule    2010       10/1/2010       take 1 capsule (500 mg) by oral

 route every 6 hours for 10 days         

 

                Bactrim -160 mg oral tablet    2011       take 1 tablet by oral route

 every 12 hours for 7 days               

 

                triamcinolone acetonide 0.1 % topical cream    2011      apply a thin

 layer to the affected area(s) by topical route 2 times per day     

 

                sertraline 100 mg oral tablet    4/10/2012       5/10/2012       take 1.5 tablets by oral route

 daily for 30 days                       

 

                ergocalciferol (vitamin D2) 50,000 unit oral capsule    4/15/2013       2013       TAKE

 ONE CAPSULE BY MOUTH ONCE A WEEK        

 

                CYANOCOBALAM 1000MCGINJ 1000 milliliter    2013       INJECT 1ML INTRAMUSCULAR

 ONCE A MONTH                            

 

                pravastatin 40 mg oral tablet    3/25/2014       3/20/2015       TAKE ONE TABLET BY MOUTH EVERY

 DAY                                     

 

                          Zostavax (PF) 19,400 unit/0.65 mL subcutaneous suspension for reconstitution    3/23/2015

                    3/24/2015           inject 0.65 milliliter by subcutaneous route once     

 

                lithium carbonate 300 mg oral capsule    2015       take 1 capsule (300

 mg) by oral route 2 for 30 days         

 

                famciclovir 500 mg oral tablet    12/3/2015       12/10/2015      take 1 tablet (500 mg) by

 oral route every 8 hours for 7 days     









                                        Discontinued 

 

             Name         Start Date    Discontinued Date    SIG          Comments

 

                Tylenol 325 mg oral tablet                    2013        take 1 - 2 tablets (325 -650 mg) by oral

 route every 4-6 hours as needed         

 

                Calcium 600 + D(3) 600 mg(1,500mg) -400 unit oral tablet                    2011       take 1 tablet

 by oral route 2 times a day            no longer taking

 

                Vitamin B-12 1,000 mcg oral tablet extended release    2010       take 1

 tablet by oral route daily             no longer taking

 

                Antifungal (clotrimazole) 1 % topical cream    2010       apply to the 

affected and surrounding areas of skin by topical route 2 times per day morning 
and evening                              

 

                sertraline 100 mg oral tablet    5/10/2011       2011       take 2 tablets (200 mg) by 

oral route once daily                   discontinued by Dr. Serrano

 

                mirtazapine 15 mg oral tablet                    2011        take 1 tablet (15 mg) by oral route 

once daily before bedtime               Dr. Serrano

 

                mirtazapine 15 mg oral tablet                    2011        take 1 tablet (15 mg) by oral route 

once daily before bedtime               dc'd by Dr. Serrano

 

                Pristiq 50 mg oral tablet extended release 24 hr                    2013        take 1 tablet (50

 mg) by oral route once daily           Dr. Serrano

 

                Pristiq 50 mg oral tablet extended release 24 hr                    2013        take 1 tablet (50

 mg) by oral route once daily           dose updated

 

                Vitamin B-12 1,000 mcg/mL injection solution    2011        inject 1 milliliter

 (1,000 mcg) by intramuscular route once a month    on list already

 

                clotrimazole 1 % topical cream    2011        apply to the affected and surrounding

 areas of skin by topical route 2 times per day in the morning and evening     

 

                Vitamin D2 50,000 unit oral capsule    2011        take 1 capsule (50,000

 unit) by oral route once weekly        generic on list

 

                Pravachol 40 mg oral tablet    2012        take 1 tablet (40 mg) by oral 

route once daily for 90 days            generic on list

 

                lithium carbonate 300 mg oral capsule    2012        take 1 capsule by oral

 route daily                            dose updated

 

                Pristiq 100 mg oral tablet extended release 24 hr                    4/10/2012       take 1 and 1/2 

tablet (150 mg) by oral route once daily    Mental Health provider

 

                Pristiq 100 mg oral tablet extended release 24 hr                    4/10/2012       take 1 and 1/2 

tablet (150 mg) by oral route once daily    Discontinued by Dr Efrain Knight at [facility]

 

                hydroxyzine HCl 50 mg oral tablet    10/16/2014      2015       take 1 tablet (50 mg) 

by oral route at bedtime                 

 

                fluconazole 100 mg oral tablet    2015       12/3/2015       take 1 tablet (100 mg) by 

oral route once a week                   

 

                ketoconazole 2 % topical cream    2015       12/3/2015       apply to the affected area(s)

 by topical route 2 times per day        







Problem List







                    Description         Status              Onset

 

                    Artificial opening status; colostomy    Active               

 

                    Bipolar disorder, unspecified    Active               

 

                    Hyperlipidemia      Active               

 

                    Peritoneal Neoplasm, Malignant    Active               

 

                    Anemia, Pernicious    Active               

 

                    Arthritis unspecified    Active               

 

                    B12 deficiency      Active               







Vital Signs







      Date    Time    BP-Sys(mm[Hg]    BP-Lynn(mm[Hg])    HR(bpm)    RR(rpm)    Temp    WT    HT    HC    BMI

                    BSA                 BMI Percentile      O2 Sat(%)

 

        12/3/2015    9:50:00 AM    132 mmHg    70 mmHg    62 bpm    16 rpm    97.9 F    145 lbs    69 in

                          21.41 kg/m2    1.79 m2                   100 %

 

        2015    8:52:00 AM    132 mmHg    68 mmHg    52 bpm    20 rpm    97.8 F    141 lbs    69 in

                          20.8218 kg/m    1.7645 m                 100 %

 

        2015    3:25:00 PM    120 mmHg    62 mmHg    72 bpm    16 rpm    98.1 F    136 lbs    69 in

                          20.08 kg/m2    1.73 m2                   98 %

 

       3/23/2015    2:55:00 PM    130 mmHg    76 mmHg    68 bpm    18 rpm    97 F    140 lbs    69 in    

                20.6742 kg/m    1.7583 m                      98 %

 

        10/16/2014    11:11:00 AM    120 mmHg    66 mmHg    77 bpm    20 rpm    98 F    130 lbs    69 in

                          19.20 kg/m2    1.69 m2                   100 %

 

        2014    3:21:00 PM    130 mmHg    66 mmHg    63 bpm    18 rpm    97.2 F    160 lbs    69 in

                          23.6276 kg/m    1.8797 m                 99 %

 

        2013    10:35:00 AM    132 mmHg    70 mmHg    66 bpm    20 rpm    98.1 F    157 lbs    69 in

                          23.18 kg/m2    1.86 m2                    

 

        2013    1:29:00 PM    132 mmHg    70 mmHg    76 bpm    18 rpm    98.2 F    166 lbs    69 in 

                          24.5137 kg/m    1.9146 m                  

 

       2013    2:46:00 PM    128 mmHg    70 mmHg    76 bpm    16 rpm    98 F    160 lbs    69 in     

                23.63 kg/m2     1.88 m2                          

 

        2011    8:49:00 AM    128 mmHg    78 mmHg    70 bpm    18 rpm    97.9 F    164 lbs    69 in

                          24.2183 kg/m    1.903 m                  

 

     2011    1:31:00 PM    132 mmHg    68 mmHg    84 bpm         97 F    167 lbs                        

                                         

 

        2011    9:09:00 AM    128 mmHg    70 mmHg    72 bpm    18 rpm    98.2 F    163 lbs    64 in 

                          27.9786 kg/m    1.8272 m                  

 

       2011    10:01:00 AM    132 mmHg    70 mmHg    72 bpm    18 rpm    98.2 F    154 lbs             

                                                                 

 

       2011    2:47:00 PM    128 mmHg    70 mmHg    72 bpm    18 rpm    97.8 F    156 lbs             

                                                                 

 

       5/10/2011    3:16:00 PM    144 mmHg    80 mmHg    72 bpm    18 rpm    98.2 F    158 lbs             

                                                                 

 

        2011    10:11:00 AM    132 mmHg    70 mmHg    70 bpm    18 rpm    98.2 F    168 lbs    69 in

                          24.809 kg/m    1.9261 m                  

 

        4/15/2011    10:52:00 AM    110 mmHg    60 mmHg    75 bpm    16 rpm    97.5 F    172.375 lbs    

69 in                     25.46 kg/m2    1.95 m2                   100 %

 

        2011    11:43:00 AM    120 mmHg    82 mmHg    75 bpm    16 rpm    97.2 F    178.5 lbs    69

 in                       26.3596 kg/m    1.9854 m                 100 %

 

        10/15/2010    1:32:00 PM    120 mmHg    70 mmHg    80 bpm    18 rpm    96.6 F    177 lbs    69 in

                          26.14 kg/m2    1.98 m2                   100 %

 

        2010    3:50:00 PM    168 mmHg    100 mmHg    82 bpm    18 rpm    97.8 F    177.5 lbs    69

 in                       26.2119 kg/m    1.9798 m                 97 %

 

        2010    1:21:00 PM    140 mmHg    80 mmHg    59 bpm    16 rpm    97.6 F    173.25 lbs    69 

in                        25.58 kg/m2    1.96 m2                   100 %

 

        2010    3:02:00 PM    140 mmHg    80 mmHg    61 bpm    16 rpm    97.6 F    173.125 lbs    69

 in                       25.5658 kg/m    1.9553 m                 99 %

 

        2010    1:23:00 PM    130 mmHg    80 mmHg    66 bpm    16 rpm    96.8 F    173 lbs    69 in 

                          25.55 kg/m2    1.95 m2                   100 %

 

        2010    12:58:00 PM    130 mmHg    88 mmHg    75 bpm    16 rpm    98.4 F    172.25 lbs    69

 in                       25.4366 kg/m    1.9503 m                 100 %







Social History







                    Name                Description         Comments

 

                    denies alcohol use                         

 

                    denies smoking                           

 

                    Denies illicit substance abuse                         

 

                    retired                                 direct care

 

                    Single                                   

 

                    Exercises regularly                         

 

                    Attended some college                         

 

                    Tobacco             Never smoker         







History of Procedures







                    Date Ordered        Description         Order Status

 

                    2010 12:00 AM    COMPREHEN METABOLIC PANEL    Reviewed

 

                    2010 12:00 AM    COMPLETE CBC W/AUTO DIFF WBC    Reviewed

 

                    2010 12:00 AM    LIPID PANEL         Reviewed

 

                          2015 12:00 AM        B12 Injection, Up to 1000 Mcg NDC#3183-9636-09 [facility] Medicare 

                                        Reviewed

 

                    2011 12:00 AM    MAMMOGRAM SCREENING    Reviewed

 

                    2011 12:00 AM    CYTOPATH C/V THIN LAYER    Reviewed

 

                    2011 12:00 AM    B12 Injection 1 cc NDC#[address]-36    Reviewed

 

                    2015 12:00 AM    THER/PROPH/DIAG INJ SC/IM    Reviewed

 

                    2015 12:00 AM    B12 Injection, Up to 1000 Mcg NDC#0619-2077-98    Reviewed

 

                    2011 12:00 AM    THER/PROPH/DIAG INJ SC/IM    Reviewed

 

                    2011 12:00 AM    B12 Injection([facility]) Ndc#1203-6359-65-    Reviewed

 

                    10/20/2011 12:00 AM    THER/PROPH/DIAG INJ SC/IM    Reviewed

 

                    10/20/2011 12:00 AM    B12 Injection([facility]) Ndc#7759-8601-39-    Reviewed

 

                    2011 12:00 AM    ***Immunization administration, Medicare flu    Reviewed

 

                    2011 12:00 AM    Fluzone ** MEDICARE Only **    Reviewed

 

                    2011 12:00 AM    THER/PROPH/DIAG INJ SC/IM    Reviewed

 

                    2011 12:00 AM    B12 Injection ([facility]) Ndc#7482-7685-73    Reviewed

 

                    2012 12:00 AM    THER/PROPH/DIAG INJ SC/IM    Reviewed

 

                    2012 12:00 AM    B12 Injection ([facility]) Ndc#9237-0986-60    Reviewed

 

                    2012 12:00 AM    THER/PROPH/DIAG INJ SC/IM    Reviewed

 

                    2012 12:00 AM    B12 Injection([place]) Ndc#7570-2894-77-    Reviewed

 

                    5/3/2012 12:00 AM    THER/PROPH/DIAG INJ SC/IM    Reviewed

 

                    5/3/2012 12:00 AM    B12 Injection([facility]) Ndc#1724-1860-03-    Reviewed

 

                    2012 12:00 AM    IMMUNOTHERAPY INJECTIONS    Reviewed

 

                    2012 12:00 AM    B12 Injection([place]) Ndc#7925-7475-22-    Reviewed

 

                    2012 12:00 AM    THER/PROPH/DIAG INJ SC/IM    Reviewed

 

                    2012 12:00 AM    B12 Injection, Up to 1000 Mcg NDC#7972-0582-85    Reviewed

 

                    2012 12:00 AM    THER/PROPH/DIAG INJ SC/IM    Reviewed

 

                    2012 12:00 AM    B12 Injection, Up to 1000 Mcg NDC#7383-9630-41    Reviewed

 

                    2012 12:00 AM    THER/PROPH/DIAG INJ SC/IM    Reviewed

 

                    2012 12:00 AM    B12 Injection, Up to 1000 Mcg NDC#7135-5451-19    Reviewed

 

                    10/16/2012 12:00 AM    THER/PROPH/DIAG INJ SC/IM    Reviewed

 

                    10/16/2012 12:00 AM    B12 Injection, Up to 1000 Mcg NDC#9294-9724-67    Reviewed

 

                    2010 12:00 AM    COMPREHEN METABOLIC PANEL    Reviewed

 

                    2010 12:00 AM    COMPLETE CBC W/AUTO DIFF WBC    Reviewed

 

                    2010 12:00 AM    LIPID PANEL         Reviewed

 

                    2013 12:00 AM    Flu Injection 3 Years And Above NDC# [address]-12  [facility]    Reviewed



 

                    2013 12:00 AM    COMPLETE CBC W/AUTO DIFF WBC    Reviewed

 

                    2013 12:00 AM    ASSAY OF LITHIUM    Reviewed

 

                    2013 12:00 AM    METABOLIC PANEL TOTAL CA    Reviewed

 

                    4/3/2013 12:00 AM    THER/PROPH/DIAG INJ SC/IM    Reviewed

 

                    4/3/2013 12:00 AM    B12 Injection, Up to 1000 Mcg NDC#6854-1310-19    Reviewed

 

                    2013 12:00 AM    THER/PROPH/DIAG INJ SC/IM    Reviewed

 

                    2013 12:00 AM    B12 Injection, Up to 1000 Mcg NDC#7519-4392-04    Reviewed

 

                    2013 12:00 AM    THER/PROPH/DIAG INJ SC/IM    Reviewed

 

                    2013 12:00 AM    B12 Injection, Up to 1000 Mcg NDC#1062-7233-57    Reviewed

 

                    2013 12:00 AM    LIPID PANEL         Reviewed

 

                    2013 12:00 AM    VITAMIN D 25 HYDROXY    Reviewed

 

                    2013 12:00 AM    THER/PROPH/DIAG INJ SC/IM    Reviewed

 

                    3/6/2014 12:00 AM    THER/PROPH/DIAG INJ SC/IM    Reviewed

 

                    2014 12:00 AM    THER/PROPH/DIAG INJ SC/IM    Reviewed

 

                    2014 12:00 AM    B12 Injection, Up to 1000 Mcg NDC#5255-6294-25    Reviewed

 

                    2010 12:00 AM    SKIN FUNGI CULTURE    Reviewed

 

                    10/9/2010 12:00 AM    COMPREHEN METABOLIC PANEL    Reviewed

 

                    10/9/2010 12:00 AM    LIPID PANEL         Reviewed

 

                    2010 12:00 AM    THER/PROPH/DIAG INJ SC/IM    Reviewed

 

                    2010 12:00 AM    B12 Injection Ndc#[address]-78 ([facility])    Reviewed

 

                    2010 12:00 AM    THER/PROPH/DIAG INJ SC/IM    Reviewed

 

                    2010 12:00 AM    Kenalog 40 Mg Im-Ndc#[address]-32 ([facility])    Reviewed

 

                    10/15/2010 12:00 AM    FLU VACCINE 3 YRS & > IM    Reviewed

 

                    10/15/2010 12:00 AM    Admin.Of M/C Cov.Vaccine-Flu Vacc.    Reviewed

 

                    1/15/2011 12:00 AM    COMPLETE CBC W/AUTO DIFF WBC    Reviewed

 

                    1/15/2011 12:00 AM    COMPREHEN METABOLIC PANEL    Reviewed

 

                    1/15/2011 12:00 AM    LIPID PANEL         Reviewed

 

                    2014 12:00 AM    MAMMOGRAM SCREENING    Reviewed

 

                    2014 12:00 AM    Screening mammography, bilateral    Reviewed

 

                    7/10/2014 12:00 AM    THER/PROPH/DIAG INJ SC/IM    Reviewed

 

                    7/10/2014 12:00 AM    B12 Injection, Up to 1000 Mcg NDC#9948-3668-83    Reviewed

 

                    2011 12:00 AM    COMPLETE CBC W/AUTO DIFF WBC    Reviewed

 

                    2011 12:00 AM    COMPREHEN METABOLIC PANEL    Reviewed

 

                    2011 12:00 AM    LIPID PANEL         Reviewed

 

                    2014 12:00 AM    B12 Injection, Up to 1000 Mcg NDC#2743-0519-67    Reviewed

 

                    10/19/2014 12:00 AM    MAMMOGRAM SCREENING    Reviewed

 

                    10/19/2014 12:00 AM    Screening mammography, bilateral    Reviewed

 

                    10/16/2014 12:00 AM    COMPLETE CBC W/AUTO DIFF WBC    Reviewed

 

                    10/16/2014 12:00 AM    COMPREHEN METABOLIC PANEL    Reviewed

 

                    10/16/2014 12:00 AM    IMMUNOASSAY TUMOR     Reviewed

 

                    10/16/2014 12:00 AM    LIPID PANEL         Reviewed

 

                    10/16/2014 12:00 AM    ASSAY OF LITHIUM    Reviewed

 

                    10/16/2014 12:00 AM    MAMMOGRAM SCREENING    Reviewed

 

                    2011 12:00 AM    ASSAY OF PARATHORMONE    Reviewed

 

                    2011 12:00 AM    VITAMIN D 25 HYDROXY    Reviewed

 

                    2011 12:00 AM    ASSAY OF LITHIUM    Reviewed

 

                    2011 12:00 AM    METABOLIC PANEL TOTAL CA    Reviewed

 

                    2011 12:00 AM    CT HEAD/BRAIN W/O & W/DYE    Reviewed

 

                    3/23/2015 12:00 AM    PNEUMOCOCCAL VACC 13 GLENDY IM    Reviewed

 

                    3/23/2015 12:00 AM    Vitamin B12 injection    Reviewed

 

                    2011 12:00 AM    ASSAY OF LITHIUM    Reviewed

 

                    2011 12:00 AM    B12 Injection Ndc#[address]-20  [facility]    Reviewed

 

                    2015 12:00 AM    THER/PROPH/DIAG INJ SC/IM    Reviewed

 

                    2015 12:00 AM    B12 Injection, Up to 1000 Mcg NDC#7054-5529-86    Reviewed

 

                    2015 12:00 AM    COMPLETE CBC W/AUTO DIFF WBC    Reviewed

 

                    2015 12:00 AM    COMPREHEN METABOLIC PANEL    Reviewed

 

                    2015 12:00 AM    LIPID PANEL         Reviewed

 

                    2015 12:00 AM    ASSAY OF LITHIUM    Reviewed

 

                    2011 12:00 AM    VIT D 1 25-DIHYDROXY    Reviewed

 

                    2011 12:00 AM    VITAMIN B-12        Reviewed

 

                    2015 12:00 AM    B12 Injection, Up to 1000 Mcg NDC#3464-8684-36    Reviewed

 

                    2015 12:00 AM    THER/PROPH/DIAG INJ SC/IM    Reviewed

 

                    2015 12:00 AM    B12 Injection, Up to 1000 Mcg NDC#0793-1777-91    Reviewed

 

                    2011 12:00 AM    THER/PROPH/DIAG INJ SC/IM    Reviewed

 

                    2011 12:00 AM    B12 Injection ([facility]) Ndc#6629-8857-20    Reviewed

 

                    2015 12:00 AM    THER/PROPH/DIAG INJ SC/IM    Reviewed

 

                    2015 12:00 AM    B12 Injection, Up to 1000 Mcg NDC#3262-7923-02    Reviewed







Results Summary







                          Data and Description      Results

 

                          2004 12:00 AM        Colonoscopy-Women and Men over 50 Normal 

 

                          2008 12:00 AM         Pap Smear Declined 

 

                          10/7/2009 12:00 AM        Cholest Cry Stone Ql .0 %LDLc SerPl-mCnc 123.0 mg/dLHDLc

 SerPl-mCnc 34.0 mg/dLTrigl SerPl-mCnc 190.0 mg/dLGlucose SerPl-mCnc 78.0 mg/dL

 

                          2009 12:00 AM        Mammogram -Women over 40 Normal HIV1+2 Ab Ser Ql no risk 

 

                          2010 8:47 AM         Dexa Bone Scan Refused Aspirin reccommended Contraindication 



 

                          2010 8:48 AM         Depression Done 

 

                          2010 12:00 AM         Foot Exam-Diabetic Done 

 

                          2010 12:00 AM         Cholest Cry Stone Ql .0 %LDLc SerPl-mCnc 126.0 mg/dLGlucose

 SerPl-mCnc 102.0 mg/dL

 

                          2010 8:45 AM          TRIGLYCERIDES 122.0 mg/dLCHOLESTEROL 186.0 mg/dLHDL 36.0 mg/dLLDL

 (CALC) 126.0 mg/dLGLUCOSE 102.0 mg/dLSODIUM 143.0 mmol/LPOTASSIUM 3.70 
mmol/LCHLORIDE 111.0 mmol/LCO2 23.0 mmol/LBUN 10.0 mg/dLCREATININE 0.80 
mg/dLSGOT/AST 12.0 IU/LSGPT/ALT 11.0 IU/LALK PHOS 65.0 IU/LTOTAL PROTEIN 7.20 
g/dLALBUMIN 3.90 g/dLTOTAL BILI 0.50 mg/dLCALCIUM 10.20 mg/dLeGFR >60 
mL/min/1.73 m2WBC 5.7 RBC 3.26 HGB 10.60 g/dLHCT 31.70 %MCV 97.0 fLMCH 32.50 
pgMCHC 33.40 g/dLRDW CV 13.30 %MPV 9.70 fLPLT 287 %NEUT 62.90 %%LYMP 21.80 
%%MONO 9.90 %%EOS 5.0 %%BASO 0.40 %#NEUT 3.56 #LYMP 1.23 #MONO 0.56 #EOS 0.28 
#BASO 0.02 

 

                          2010 12:00 AM        Glucose SerPl-mCnc 96.0 mg/dLCholest Cry Stone Ql .0 %LDLc

 SerPl-mCnc 146.0 mg/dL

 

                          2010 8:26 AM         TRIGLYCERIDES 106.0 mg/dLCHOLESTEROL 199.0 mg/dLHDL 32.0 mg/dLLDL

 (CALC) 146.0 mg/dLGLUCOSE 96.0 mg/dLSODIUM 143.0 mmol/LPOTASSIUM 4.0 
mmol/LCHLORIDE 113.0 mmol/LCO2 24.0 mmol/LBUN 13.0 mg/dLCREATININE 1.0 
mg/dLSGOT/AST 11.0 IU/LSGPT/ALT 6.0 IU/LALK PHOS 56.0 IU/LTOTAL PROTEIN 6.60 
g/dLALBUMIN 3.80 g/dLTOTAL BILI 0.50 mg/dLCALCIUM 9.30 mg/dLeGFR 57 

 

                          10/6/2010 12:00 AM        Cholest Cry Stone Ql .0 %LDLc SerPl-mCnc 111.0 mg/dLGlucose

 SerPl-mCnc 81.0 mg/dL

 

                          10/6/2010 2:45 PM         TRIGLYCERIDES 123.0 mg/dLCHOLESTEROL 178.0 mg/dLHDL 42.0 mg/dLLDL

 (CALC) 111.0 mg/dLGLUCOSE 81.0 mg/dLSODIUM 139.0 mmol/LPOTASSIUM 4.10 
mmol/LCHLORIDE 106.0 mmol/LCO2 24.0 mmol/LBUN 13.0 mg/dLCREATININE 0.90 
mg/dLSGOT/AST 13.0 IU/LSGPT/ALT 11.0 IU/LALK PHOS 61.0 IU/LTOTAL PROTEIN 7.10 
g/dLALBUMIN 3.90 g/dLTOTAL BILI 0.30 mg/dLCALCIUM 9.30 mg/dLeGFR >60 mL/min/1.73
 m2WBC 6.9 RBC 3.59 HGB 11.50 g/dLHCT 35.30 %MCV 98.0 fLMCH 32.0 pgMCHC 32.60 
g/dLRDW CV 12.90 %MPV 9.90 fLPLT 311 %NEUT 64.90 %%LYMP 22.50 %%MONO 7.20 %%EOS 
5.10 %%BASO 0.30 %#NEUT 4.45 #LYMP 1.54 #MONO 0.49 #EOS 0.35 #BASO 0.02 

 

                          2011 12:00 AM         Mammogram -Women over 40 Ordered 

 

                          2011 10:25 AM        TRIGLYCERIDES 111.0 mg/dLCHOLESTEROL 195.0 mg/dLHDL 43.0 mg/dLLDL

 (CALC) 130.0 mg/dLWBC 5.3 RBC 3.76 HGB 12.0 g/dLHCT 37.80 %.0 fLMCH 
31.90 pgMCHC 31.70 g/dLRDW CV 13.0 %MPV 9.70 fLPLT 259 %NEUT 69.0 %%LYMP 17.60 
%%MONO 8.30 %%EOS 4.70 %%BASO 0.40 %#NEUT 3.63 #LYMP 0.93 #MONO 0.44 #EOS 0.25 
#BASO 0.02 GLUCOSE 102.0 mg/dLSODIUM 146.0 mmol/LPOTASSIUM 4.20 mmol/LCHLORIDE 
113.0 mmol/LCO2 23.0 mmol/LBUN 15.0 mg/dLCREATININE 1.0 mg/dLSGOT/AST 12.0 
IU/LSGPT/ALT 17.0 IU/LALK PHOS 60.0 IU/LTOTAL PROTEIN 6.90 g/dLALBUMIN 4.20 
g/dLTOTAL BILI 0.40 mg/dLCALCIUM 9.70 mg/dLeGFR 57 

 

                          2011 11:49 AM        Cholest Cry Stone Ql .0 %LDLc SerPl-mCnc 130.0 mg/dLHDLc

 SerPl-mCnc 43.0 mg/dLTrigl SerPl-mCnc 111.0 mg/dLGlucose SerPl-mCnc 102.0 mg/dL

 

                          2011 11:52 AM        Pap Smear Declined 

 

                          2011 11:28 AM        Lithium 2.080 mmol/LGLUCOSE 102.0 mg/dLSODIUM 135.0 mmol/LPOTASSIUM

 3.90 mmol/LCHLORIDE 106.0 mmol/LCO2 21.0 mmol/LBUN 12.0 mg/dLCREATININE 1.30 
mg/dLCALCIUM 10.70 mg/dLeGFR 42 

 

                          2011 8:58 AM          Lithium 0.690 mmol/L

 

                          2011 2:38 PM         VITAMIN B12 3483.0 pg/mL

 

                          2013 3:35 PM          WBC 5.1 RBC 3.73 HGB 11.70 g/dLHCT 36.40 %MCV 98.0 fLMCH 31.40

 pgMCHC 32.10 g/dLRDW CV 13.10 %MPV 9.80 fLPLT 224 %NEUT 66.80 %%LYMP 19.10 
%%MONO 9.0 %%EOS 4.90 %%BASO 0.20 %#NEUT 3.42 #LYMP 0.98 #MONO 0.46 #EOS 0.25 
#BASO 0.01 GLUCOSE 88.0 mg/dLSODIUM 141.0 mmol/LPOTASSIUM 4.10 mmol/LCHLORIDE 
110.0 mmol/LCO2 22.0 mmol/LBUN 22.0 mg/dLCREATININE 1.10 mg/dLCALCIUM 9.80 
mg/dLeGFR 50 Lithium 0.760 mmol/L

 

                          2013 11:02 AM        TRIGLYCERIDES 106.0 mg/dLCHOLESTEROL 181.0 mg/dLHDL 46.0 mg/dLLDL

 (CALC) 114.0 mg/dLVITAMIN D 41.10 ng/mL

 

                          10/17/2014 10:10 AM       WBC 5.0 RBC 3.66 HGB 11.60 g/dLHCT 36.80 %.0 fLMCH 31.70

 pgMCHC 31.50 g/dLRDW CV 13.50 %MPV 10.10 fLPLT 209 %NEUT 69.20 %%LYMP 21.0 
%%MONO 6.40 %%EOS 3.20 %%BASO 0.20 %#NEUT 3.46 #LYMP 1.05 #MONO 0.32 #EOS 0.16 
#BASO 0.01 GLUCOSE 100.0 mg/dLSODIUM 148.0 mmol/LPOTASSIUM 3.90 mmol/LCHLORIDE 
114.0 mmol/LCO2 26.0 mmol/LBUN 12.0 mg/dLCREATININE 1.20 mg/dLSGOT/AST 9.0 
IU/LSGPT/ALT <6 IU/LALK PHOS 82.0 IU/LTOTAL PROTEIN 6.90 g/dLALBUMIN 4.0 
g/dLTOTAL BILI 0.40 mg/dLCALCIUM 10.50 mg/dLeGFR 45 TRIGLYCERIDES 96.0 
mg/dLCHOLESTEROL 155.0 mg/dLHDL 38.0 mg/dLLDL (CALC) 98.0 mg/dLLithium 0.850 
mmol/LCancer Antigen (CA) 125 8.30 U/mL

 

                          2015 10:25 AM        Lithium 0.790 mmol/LWBC 4.8 RBC 3.44 HGB 11.0 g/dLHCT 35.20 

%.0 fLMCH 32.0 pgMCHC 31.30 g/dLRDW CV 14.0 %MPV 9.30 fLPLT 210 %NEUT 
70.80 %%LYMP 17.20 %%MONO 8.10 %%EOS 3.50 %%BASO 0.40 %#NEUT 3.41 #LYMP 0.83 
#MONO 0.39 #EOS 0.17 #BASO 0.02 TRIGLYCERIDES 107.0 mg/dLCHOLESTEROL 174.0 
mg/dLHDL 43.0 mg/dLLDL (CALC) 110.0 mg/dLGLUCOSE 90.0 mg/dLSODIUM 145.0 
mmol/LPOTASSIUM 3.80 mmol/LCHLORIDE 115.0 mmol/LCO2 24.0 mmol/LBUN 17.0 mg
/dLCREATININE 1.30 mg/dLSGOT/AST 18.0 IU/LSGPT/ALT 17.0 IU/LALK PHOS 56.0 
IU/LTOTAL PROTEIN 6.70 g/dLALBUMIN 3.90 g/dLTOTAL BILI 0.40 mg/dLCALCIUM 9.80 
mg/dLeGFR 41 







History Of Immunizations







       Name    Date Admin    Mfg Name    Mfg Code    Trade Name    Lot#    Route    Inj    Vis Given    Vis

 Pub                                    CVX

 

        Influenza    2008    Not Entered    NE      Not Entered            Not Entered    Not Entered

                    1            999

 

          Pneumococcal    2008    [employer]    MSD       Pneumovax 23              Intramuscular

                Not Entered     1        999

 

           Influenza    10/15/2010    [employer]    NOV        Fluvirin > 12 Years    

[id number]     Intramuscular    Left Deltoid    10/15/2010    2009    999

 

          Pneumococcal    3/23/2015    [facility]    [facility]       Prevnar 13    [id number]    Intramuscular

                Right Gluteous Medius    3/23/2015       2013       109







History of Past Illness







                    Name                Date of Onset       Comments

 

                    Peritoneal Neoplasm, Malignant                         

 

                    Hyperlipidemia                           

 

                    Bipolar disorder, unspecified                         

 

                    Artificial opening status; colostomy                         

 

                    B12 deficiency                           

 

                    Anemia, Pernicious                         

 

                    Arthritis unspecified                         

 

                    cervical cancer                          

 

                    Artificial opening status; colostomy    2010  1:10PM     

 

                    Bipolar disorder, unspecified    2010  1:10PM     

 

                    Hyperlipidemia      2010  1:10PM     

 

                    Anemia, Pernicious    2010  1:10PM     

 

                    Postoperative Follow-Up    2010  1:55PM     

 

                    Postoperative Follow-Up    Mar  8 2010 10:57AM     

 

                    Artificial opening status; colostomy    Mar  8 2010  1:19PM     

 

                    Peritoneal Neoplasm, Malignant    Mar  8 2010  1:19PM     

 

                    Artificial opening status; colostomy    2010  1:40PM     

 

                    Hyperlipidemia      2010  1:40PM     

 

                    Anemia, Pernicious    2010  1:40PM     

 

                    Peritoneal Neoplasm, Malignant    2010  1:40PM     

 

                    Arthritis unspecified    2010  1:40PM     

 

                    Anemia of Chronic Illness    2010  1:40PM     

 

                    Tinea corporis      2010  3:17PM     

 

                    Bipolar disorder, unspecified    2010  1:33PM     

 

                    Hyperlipidemia      2010  1:33PM     

 

                    Anemia, Pernicious    2010  1:33PM     

 

                    Peritoneal Neoplasm, Malignant    2010  1:33PM     

 

                    B12 deficiency      2010  1:33PM     

 

                    Ethmoidal Sinusitis, Acute    Sep 21 2010  3:53PM     

 

                    Wheezing            Sep 21 2010  3:53PM     

 

                    Flu                 Oct 15 2010  1:40PM     

 

                    Bipolar disorder, unspecified    Oct 15 2010  1:42PM     

 

                    Hyperlipidemia      Oct 15 2010  1:42PM     

 

                    Anemia, Pernicious    Oct 15 2010  1:42PM     

 

                    Peritoneal Neoplasm, Malignant    Oct 15 2010  1:42PM     

 

                    Bipolar disorder, unspecified    2011 12:01PM     

 

                    Hyperlipidemia      2011 12:01PM     

 

                    Anemia, Pernicious    2011 12:01PM     

 

                    Peritoneal Neoplasm, Malignant    2011 12:01PM     

 

                    Bipolar disorder, unspecified    Apr 15 2011 10:55AM     

 

                    Major Depression    2011 10:11AM     

 

                    Bipolar Disorder    2011 10:11AM     

 

                    Cancer              May 10 2011  4:16PM     

 

                    Major Depression    May 10 2011  3:16PM     

 

                    Bipolar Disorder    May 10 2011  3:16PM     

 

                    Hypercalcemia       May 23 2011  2:47PM     

 

                    Bipolar disorder, unspecified    May 23 2011  2:47PM     

 

                    Colon Cancer, Personal History    May 23 2011  2:47PM     

 

                    Bipolar Disorder    May 31 2011  4:39PM     

 

                    Depressive Disorder    2011 10:01AM     

 

                    Vitamin B12 deficiency    2011 10:01AM     

 

                    Vitamin D Deficiency    2011  5:07PM     

 

                    Anemia, Vitamin B12 Deficiency    2011  5:07PM     

 

                    B12 deficiency      2011  3:56PM     

 

                    Routine gynecological examination    Aug  4 2011  9:08AM     

 

                    Screening Examination for Breast Cancer    Aug  4 2011  9:08AM     

 

                    Tinea Corporis      Aug  4 2011  9:08AM     

 

                    Depressive Disorder    Sep 23 2011  8:47AM     

 

                    Contact Dermatitis    Sep 23 2011  8:47AM     

 

                    Anemia, Pernicious    Sep 23 2011  8:47AM     

 

                    B12 deficiency      Sep 23 2011  8:47AM     

 

                    B12 deficiency      Sep 27 2011  2:58PM     

 

                    B12 deficiency      Oct 20 2011  2:34PM     

 

                    Flu                 Dec  9 2011  3:16PM     

 

                    B12 deficiency      Dec  9 2011  3:17PM     

 

                    B12 deficiency      2012  4:52PM     

 

                    B12 deficiency      Feb 2012 11:10AM     

 

                    B12 deficiency      2012  3:37PM     

 

                    B12 deficiency      May  3 2012  4:10PM     

 

                    B12 deficiency      2012  2:54PM     

 

                    B12 deficiency      2012 11:23AM     

 

                    B12 deficiency      Aug  9 2012  2:08PM     

 

                    B12 deficiency      Sep  6 2012  4:36PM     

 

                    B12 deficiency      Oct 16 2012 10:23AM     

 

                    Flu                 Feb  4   3:11PM     

 

                    Bipolar disorder, unspecified    Feb  2013  2:48PM     

 

                    Anemia, Pernicious    Feb  2013  2:48PM     

 

                    B12 deficiency      Feb  4   2:48PM     

 

                    Extrapyramidal abnormal movement disorder    Feb  4   2:48PM     

 

                    B12 deficiency      Apr  3 2013 12:03PM     

 

                    Bipolar disorder, unspecified    May  7 2013  1:31PM     

 

                    Anemia, Pernicious    May  7 2013  1:31PM     

 

                    B12 deficiency      May  7 2013  1:31PM     

 

                    Extrapyramidal abnormal movement disorder    May  7 2013  1:31PM     

 

                    B12 deficiency      2013  3:42PM     

 

                    B12 deficiency      2013  1:31PM     

 

                    Hyperlipidemia      Aug  7 2013 10:37AM     

 

                    Vitamin D Deficiency    Aug  7 2013 10:37AM     

 

                    Bipolar disorder, unspecified    Aug  7 2013 10:37AM     

 

                    Anemia, Pernicious    Aug  7 2013 10:37AM     

 

                    B12 deficiency      Aug  7 2013 10:37AM     

 

                    B12 deficiency      Sep 25 2013 11:15AM     

 

                    B12 deficiency      Dec 11 2013  3:16PM     

 

                    B12 deficiency      Mar  6 2014  1:48PM     

 

                    B12 deficiency      May 21 2014  3:17PM     

 

                    Screening Examination for Breast Cancer    2014  3:23PM     

 

                    Periumbilical abdominal pain    2014  3:23PM     

 

                    B12 deficiency      Jul 10 2014  2:52PM     

 

                    Anemia, Vitamin B12 Deficiency    Aug 13 2014  4:50PM     

 

                    Bipolar disorder    Oct 16 2014 11:13AM     

 

                    Hyperlipidemia      Oct 16 2014 11:13AM     

 

                    Anemia, Pernicious    Oct 16 2014 11:13AM     

 

                    Peritoneal Neoplasm, Malignant    Oct 16 2014 11:13AM     

 

                    Screening breast examination    Oct 16 2014 11:13AM     

 

                    Weight loss         Oct 16 2014 11:13AM     

 

                    Anemia, Pernicious    Mar 23 2015  2:57PM     

 

                    B12 deficiency      Mar 23 2015  2:57PM     

 

                    Need for Prevnar vaccine    Mar 23 2015  2:57PM     

 

                    Bipolar disorder    Mar 23 2015  2:57PM     

 

                    Hyperlipidemia      Mar 23 2015  2:57PM     

 

                    Anemia, Pernicious    Mar 23 2015  2:57PM     

 

                    Peritoneal Neoplasm, Malignant    Mar 23 2015  2:57PM     

 

                    B12 deficiency      May  4 2015  4:48PM     

 

                    Hyperlipidemia      May 13 2015  9:56AM     

 

                    Anemia              May 13 2015  9:56AM     

 

                    Bipolar disorder    May 13 2015  9:56AM     

 

                    Bipolar disorder    May 14 2015  3:27PM     

 

                    Hyperlipidemia      May 14 2015  3:27PM     

 

                    Anemia, Pernicious    May 14 2015  3:27PM     

 

                    Peritoneal Neoplasm, Malignant    May 14 2015  3:27PM     

 

                    B12 deficiency      2015  2:20PM     

 

                    B12 deficiency      2015 11:34AM     

 

                    B12 deficiency      Aug 18 2015  9:06AM     

 

                    Tinea Corporis      Sep 18 2015  8:54AM     

 

                    B12 deficiency      Sep 18 2015  8:54AM     

 

                    B12 deficiency      2015 10:28AM     

 

                    Herpes zoster without complication    Dec  3 2015  9:52AM     







Payers







           Insurance Name    Company Name    Plan Name    Plan Number    Policy Number    Policy Group

 Number                                 Start Date

 

                    [employer]              [id number]              N/A

 

                    Medicare Part A    Medicare Part A              [id number]              N/A

 

                    Medicare Part B    Medicare Of [place]              [id number]              2006

 

                          [employer]    [employer]                 50 percent

                                                    2009







History of Encounters







                    Visit Date          Visit Type          Provider

 

                    12/3/2015           Office visit        Bhupinder Louise DO

 

                    2015          Nurse visit         Bhupinder Louise DO

 

                    2015           Office visit        Bhupinder Louise DO

 

                    2015           Nurse visit         Bhupinder Louise DO

 

                    2015            Nurse visit         Bhupinder Louise DO

 

                    2015            Nurse visit         Bhupinder Louise DO

 

                    2015           Office visit        Bhupinder Louise DO

 

                    2015            Nurse visit         Bhupinder Louise DO

 

                    3/23/2015           Office visit        Bhupinder Louise DO

 

                    10/16/2014          Office visit        Bhupinder Louise DO

 

                    2014           Nurse visit         Radha GREEN

 

                    7/10/2014           Nurse visit         Bhupinder Louise DO

 

                    2014           Office visit        Bhupinder Louise DO

 

                    2014           Nurse visit         Bhupinder Aspen DO

 

                    3/6/2014            Nurse visit         Bhupinder Aspen DO

 

                    2014            Yasmine Lopez MD

 

                    2013          Nurse visit         Bhupinder Aspen DO

 

                    2013           Nurse visit         Bhupinder Aspen DO

 

                    2013            Office visit        Bhupinder Aspen DO

 

                    2013            Nurse visit         Bhupinder Aspen DO

 

                    2013            Nurse visit         Bhupinder Aspen DO

 

                    2013            Office visit        Bhupinder Aspen DO

 

                    4/3/2013            Nurse visit         Bhupinder Aspen DO

 

                    2013            Office visit        Bhupinder Aspen DO

 

                    10/16/2012          Nurse visit         Bhupinder Aspen DO

 

                    2012            Nurse visit         Bhupinder Aspen DO

 

                    2012            Voided              Bhupinder Aspen DO

 

                    2012            Nurse visit         Bhupinder Aspen DO

 

                    2012            Nurse visit         Bhupinder Aspen DO

 

                    2012           Nurse visit         Bhupinder Aspen DO

 

                    5/3/2012            Nurse visit         Bhupinder Aspen DO

 

                    2012           Nurse visit         Bhupinder Aspen DO

 

                    2012           Nurse visit         Bhupinder Aspen DO

 

                    2012           Nurse visit         Bhupinder Aspen DO

 

                    2011           Nurse visit         Bhupinder Aspen DO

 

                    10/20/2011          Nurse visit         Bhupinder Aspen DO

 

                    2011           Office visit        Bhupinder Aspen DO

 

                    2011           Nurse visit         Radha GREEN

 

                    2011            Office visit        Bhupinder Aspen DO

 

                    2011           Nurse visit         Bhupinder Aspen DO

 

                    2011            Office visit        Bhupinder Aspen DO

 

                    2011           Office visit        Bhupinder Aspen DO

 

                    5/10/2011           Office visit        Bhupinder Aspen DO

 

                    2011           Office visit        Bhupidner Aspen DO

 

                    4/15/2011           Office visit        Devin Angel DO

 

                    2011           Office visit        Devin Angel DO

 

                    10/15/2010          Office visit        Devin Angel DO

 

                    2010           Office visit        Devin Angel DO

 

                    2010            Office visit        Devin Angel DO

 

                    2010           Office visit        Devin Angel DO

 

                    2010            Office visit        Devin Angel DO

 

                    3/8/2010            Office visit        Devin Masterson MD

 

                    2010            Surgery             Devin Masterson MD

 

                    2010            Office visit        Devin Angel DO

 

                    2010           Surgery             Devin Masterson MD

 

                    2010           Hospital            Devin Masterson MD

 

                    2010           [facility]            Devin Masterson MD

 

                    10/22/2009          Office visit        Devin Angel DO

## 2019-06-26 NOTE — XMS REPORT
MU2 Ambulatory Summary

                             Created on: 2015



Pauline Gan

External Reference #: [id number]

: 1950

Sex: Female



Demographics







                          Address                   [address]

[place]  [address]

 

                          Home Phone                [phone]

 

                          Preferred Language        English

 

                          Marital Status            Legally 

 

                          Latter-day Affiliation     Unknown

 

                          Race                      White

 

                          Ethnic Group              Not  or 





Author







                          Bhupinder Aguilar

 

                          [facility]

 

                          Address                   [address]

[place]  [id number]



 

                          Phone                     [phone]







Care Team Providers







                    Care Team Member Name    Role                Phone

 

                    Bhupinder Louise    PCP                 Unavailable







Allergies and Adverse Reactions







                    Name                Reaction            Notes

 

                    NO KNOWN DRUG ALLERGIES                         







Plan of Treatment







             Planned Activity    Comments     Planned Date    Planned Time    Plan/Goal

 

             COMPLETE CBC W/AUTO DIFF WBC                 2013     12:00 AM      

 

             ASSAY OF LITHIUM                 2013     12:00 AM      

 

             METABOLIC PANEL TOTAL CA                 2013     12:00 AM      

 

             VITAMIN B-12                 2013     12:00 AM      

 

             ASSAY OF FOLIC ACID SERUM                 2013     12:00 AM      

 

             THER/PROPH/DIAG INJ SC/IM                 2013    12:00 AM      







Medications







                                        Active 

 

             Name         Start Date    Estimated Completion Date    SIG          Comments

 

                syringe with needle (disp) 1 mL 25 X 1" miscellaneous syringe    2011                        use 

for injection once a month               

 

                Latuda 20 mg oral tablet                                    take 1 tablet (20 mg) by oral route once daily with

 food (at least 350 calories)            

 

             pravastatin 40 mg oral tablet    3/30/2015                 TAKE 1 TABLET BY MOUTH DAILY     

 

                fluconazole 100 mg oral tablet    2015                       take 1 tablet (100 mg) by oral route

 once a week                             

 

                ketoconazole 2 % topical cream    2015                       apply to the affected area(s) by topical

 route 2 times per day                   

 

                Namenda Titration Shaq 5-10 mg oral tablets,dose pack    2015                       take as directed

                                         









                                         

 

             Name         Start Date    Expiration Date    SIG          Comments

 

             Reglan 10mg    3/29/2010    2010    one ac and hs     

 

                Keflex 500 mg oral capsule    2010       10/1/2010       take 1 capsule (500 mg) by oral

 route every 6 hours for 10 days         

 

                Bactrim -160 mg oral tablet    2011       take 1 tablet by oral route

 every 12 hours for 7 days               

 

                triamcinolone acetonide 0.1 % topical cream    2011      apply a thin

 layer to the affected area(s) by topical route 2 times per day     

 

                sertraline 100 mg oral tablet    4/10/2012       5/10/2012       take 1.5 tablets by oral route

 daily for 30 days                       

 

                ergocalciferol (vitamin D2) 50,000 unit oral capsule    4/15/2013       2013       TAKE

 ONE CAPSULE BY MOUTH ONCE A WEEK        

 

                CYANOCOBALAM 1000MCGINJ 1000 milliliter    2013       INJECT 1ML INTRAMUSCULAR

 ONCE A MONTH                            

 

                pravastatin 40 mg oral tablet    3/25/2014       3/20/2015       TAKE ONE TABLET BY MOUTH EVERY

 DAY                                     

 

                          Zostavax (PF) 19,400 unit/0.65 mL subcutaneous suspension for reconstitution    3/23/2015

                    3/24/2015           inject 0.65 milliliter by subcutaneous route once     

 

                lithium carbonate 300 mg oral capsule    2015       take 1 capsule (300

 mg) by oral route 2 for 30 days         









                                        Discontinued 

 

             Name         Start Date    Discontinued Date    SIG          Comments

 

                Tylenol 325 mg oral tablet                    2013        take 1 - 2 tablets (325 -650 mg) by oral

 route every 4-6 hours as needed         

 

                Calcium 600 + D(3) 600 mg(1,500mg) -400 unit oral tablet                    2011       take 1 tablet

 by oral route 2 times a day            no longer taking

 

                Vitamin B-12 1,000 mcg oral tablet extended release    2010       take 1

 tablet by oral route daily             no longer taking

 

                Antifungal (clotrimazole) 1 % topical cream    2010       apply to the 

affected and surrounding areas of skin by topical route 2 times per day morning 
and evening                              

 

                sertraline 100 mg oral tablet    5/10/2011       2011       take 2 tablets (200 mg) by 

oral route once daily                   discontinued by Dr. Serrano

 

                mirtazapine 15 mg oral tablet                    2011        take 1 tablet (15 mg) by oral route 

once daily before bedtime               Dr. Serrano

 

                mirtazapine 15 mg oral tablet                    2011        take 1 tablet (15 mg) by oral route 

once daily before bedtime               dc'd by Dr. Serrano

 

                Pristiq 50 mg oral tablet extended release 24 hr                    2013        take 1 tablet (50

 mg) by oral route once daily           Dr. Zach

 

                Pristiq 50 mg oral tablet extended release 24 hr                    2013        take 1 tablet (50

 mg) by oral route once daily           dose updated

 

                Vitamin B-12 1,000 mcg/mL injection solution    2011        inject 1 milliliter

 (1,000 mcg) by intramuscular route once a month    on list already

 

                clotrimazole 1 % topical cream    2011        apply to the affected and surrounding

 areas of skin by topical route 2 times per day in the morning and evening     

 

                Vitamin D2 50,000 unit oral capsule    2011        take 1 capsule (50,000

 unit) by oral route once weekly        generic on list

 

                Pravachol 40 mg oral tablet    2012        take 1 tablet (40 mg) by oral 

route once daily for 90 days            generic on list

 

                lithium carbonate 300 mg oral capsule    2012        take 1 capsule by oral

 route daily                            dose updated

 

                Pristiq 100 mg oral tablet extended release 24 hr                    4/10/2012       take 1 and 1/2 

tablet (150 mg) by oral route once daily    Mental Health provider

 

                Pristiq 100 mg oral tablet extended release 24 hr                    4/10/2012       take 1 and 1/2 

tablet (150 mg) by oral route once daily    Discontinued by Dr Efrain Knight at [facility]

 

                hydroxyzine HCl 50 mg oral tablet    10/16/2014      2015       take 1 tablet (50 mg) 

by oral route at bedtime                 







Problem List







                    Description         Status              Onset

 

                    Artificial opening status; colostomy    Active               

 

                    Bipolar disorder, unspecified    Active               

 

                    Hyperlipidemia      Active               

 

                    Peritoneal Neoplasm, Malignant    Active               

 

                    Anemia, Pernicious    Active               

 

                    Arthritis unspecified    Active               

 

                    B12 deficiency      Active               







Vital Signs







      Date    Time    BP-Sys(mm[Hg]    BP-Lynn(mm[Hg])    HR(bpm)    RR(rpm)    Temp    WT    HT    HC    BMI

                    BSA                 BMI Percentile      O2 Sat(%)

 

        2015    8:52:00 AM    132 mmHg    68 mmHg    52 bpm    20 rpm    97.8 F    141 lbs    69 in

                          20.82 kg/m2    1.76 m2                   100 %

 

        2015    3:25:00 PM    120 mmHg    62 mmHg    72 bpm    16 rpm    98.1 F    136 lbs    69 in

                          20.0835 kg/m    1.733 m                 98 %

 

       3/23/2015    2:55:00 PM    130 mmHg    76 mmHg    68 bpm    18 rpm    97 F    140 lbs    69 in    

                20.67 kg/m2     1.76 m2                         98 %

 

        10/16/2014    11:11:00 AM    120 mmHg    66 mmHg    77 bpm    20 rpm    98 F    130 lbs    69 in

                          19.1974 kg/m    1.6943 m                 100 %

 

        2014    3:21:00 PM    130 mmHg    66 mmHg    63 bpm    18 rpm    97.2 F    160 lbs    69 in

                          23.63 kg/m2    1.88 m2                   99 %

 

        2013    10:35:00 AM    132 mmHg    70 mmHg    66 bpm    20 rpm    98.1 F    157 lbs    69 in

                          23.1846 kg/m    1.862 m                  

 

        2013    1:29:00 PM    132 mmHg    70 mmHg    76 bpm    18 rpm    98.2 F    166 lbs    69 in 

                          24.51 kg/m2    1.91 m2                    

 

       2013    2:46:00 PM    128 mmHg    70 mmHg    76 bpm    16 rpm    98 F    160 lbs    69 in     

                23.6276 kg/m    1.8797 m                       

 

        2011    8:49:00 AM    128 mmHg    78 mmHg    70 bpm    18 rpm    97.9 F    164 lbs    69 in

                          24.22 kg/m2    1.90 m2                    

 

     2011    1:31:00 PM    132 mmHg    68 mmHg    84 bpm         97 F    167 lbs                        

                                         

 

        2011    9:09:00 AM    128 mmHg    70 mmHg    72 bpm    18 rpm    98.2 F    163 lbs    64 in 

                          27.98 kg/m2    1.83 m2                    

 

       2011    10:01:00 AM    132 mmHg    70 mmHg    72 bpm    18 rpm    98.2 F    154 lbs             

                                                                 

 

       2011    2:47:00 PM    128 mmHg    70 mmHg    72 bpm    18 rpm    97.8 F    156 lbs             

                                                                 

 

       5/10/2011    3:16:00 PM    144 mmHg    80 mmHg    72 bpm    18 rpm    98.2 F    158 lbs             

                                                                 

 

        2011    10:11:00 AM    132 mmHg    70 mmHg    70 bpm    18 rpm    98.2 F    168 lbs    69 in

                          24.81 kg/m2    1.93 m2                    

 

        4/15/2011    10:52:00 AM    110 mmHg    60 mmHg    75 bpm    16 rpm    97.5 F    172.375 lbs    

69 in                     25.4551 kg/m    1.951 m                 100 %

 

        2011    11:43:00 AM    120 mmHg    82 mmHg    75 bpm    16 rpm    97.2 F    178.5 lbs    69

 in                       26.36 kg/m2    1.99 m2                   100 %

 

        10/15/2010    1:32:00 PM    120 mmHg    70 mmHg    80 bpm    18 rpm    96.6 F    177 lbs    69 in

                          26.1381 kg/m    1.977 m                 100 %

 

        2010    3:50:00 PM    168 mmHg    100 mmHg    82 bpm    18 rpm    97.8 F    177.5 lbs    69

 in                       26.21 kg/m2    1.98 m2                   97 %

 

        2010    1:21:00 PM    140 mmHg    80 mmHg    59 bpm    16 rpm    97.6 F    173.25 lbs    69 

in                        25.5843 kg/m    1.956 m                 100 %

 

        2010    3:02:00 PM    140 mmHg    80 mmHg    61 bpm    16 rpm    97.6 F    173.125 lbs    69

 in                       25.57 kg/m2    1.96 m2                   99 %

 

        2010    1:23:00 PM    130 mmHg    80 mmHg    66 bpm    16 rpm    96.8 F    173 lbs    69 in 

                          25.5474 kg/m    1.9546 m                 100 %

 

        2010    12:58:00 PM    130 mmHg    88 mmHg    75 bpm    16 rpm    98.4 F    172.25 lbs    69

 in                       25.44 kg/m2    1.95 m2                   100 %







Social History







                    Name                Description         Comments

 

                    denies alcohol use                         

 

                    denies smoking                           

 

                    Denies illicit substance abuse                         

 

                    retired                                 direct care

 

                    Single                                   

 

                    Exercises regularly                         

 

                    Attended some college                         

 

                    Tobacco             Never smoker         







History of Procedures







                    Date Ordered        Description         Order Status

 

                    2010 12:00 AM    COMPREHEN METABOLIC PANEL    Reviewed

 

                    2010 12:00 AM    COMPLETE CBC W/AUTO DIFF WBC    Reviewed

 

                    2010 12:00 AM    LIPID PANEL         Reviewed

 

                          2015 12:00 AM        B12 Injection, Up to 1000 Mcg NDC#5438-2848-45 [facility] Medicare 

                                        Reviewed

 

                    2011 12:00 AM    MAMMOGRAM SCREENING    Reviewed

 

                    2011 12:00 AM    CYTOPATH C/V THIN LAYER    Reviewed

 

                    2011 12:00 AM    B12 Injection 1 cc NDC#[address]-75    Reviewed

 

                    2011 12:00 AM    THER/PROPH/DIAG INJ SC/IM    Reviewed

 

                    2011 12:00 AM    B12 Injection([place]) Ndc#3654-6398-11-    Reviewed

 

                    10/20/2011 12:00 AM    THER/PROPH/DIAG INJ SC/IM    Reviewed

 

                    10/20/2011 12:00 AM    B12 Injection([place]) Ndc#3702-7995-48-    Reviewed

 

                    2011 12:00 AM    ***Immunization administration, Medicare flu    Reviewed

 

                    2011 12:00 AM    Fluzone ** MEDICARE Only **    Reviewed

 

                    2011 12:00 AM    THER/PROPH/DIAG INJ SC/IM    Reviewed

 

                    2011 12:00 AM    B12 Injection ([facility]) Ndc#2960-0942-17    Reviewed

 

                    2012 12:00 AM    THER/PROPH/DIAG INJ SC/IM    Reviewed

 

                    2012 12:00 AM    B12 Injection ([facility]) Ndc#8303-6194-43    Reviewed

 

                    2012 12:00 AM    THER/PROPH/DIAG INJ SC/IM    Reviewed

 

                    2012 12:00 AM    B12 Injection([place]) Ndc#8443-0930-17-    Reviewed

 

                    5/3/2012 12:00 AM    THER/PROPH/DIAG INJ SC/IM    Reviewed

 

                    5/3/2012 12:00 AM    B12 Injection([place]) Ndc#4599-5621-55-    Reviewed

 

                    2012 12:00 AM    IMMUNOTHERAPY INJECTIONS    Reviewed

 

                    2012 12:00 AM    B12 Injection([place]) Ndc#0227-3328-86-    Reviewed

 

                    2012 12:00 AM    THER/PROPH/DIAG INJ SC/IM    Reviewed

 

                    2012 12:00 AM    B12 Injection, Up to 1000 Mcg NDC#5019-4077-31    Reviewed

 

                    2012 12:00 AM    THER/PROPH/DIAG INJ SC/IM    Reviewed

 

                    2012 12:00 AM    B12 Injection, Up to 1000 Mcg NDC#7082-6385-86    Reviewed

 

                    2012 12:00 AM    THER/PROPH/DIAG INJ SC/IM    Reviewed

 

                    2012 12:00 AM    B12 Injection, Up to 1000 Mcg NDC#2879-5941-26    Reviewed

 

                    10/16/2012 12:00 AM    THER/PROPH/DIAG INJ SC/IM    Reviewed

 

                    10/16/2012 12:00 AM    B12 Injection, Up to 1000 Mcg NDC#8180-9630-12    Reviewed

 

                    2010 12:00 AM    COMPREHEN METABOLIC PANEL    Reviewed

 

                    2010 12:00 AM    COMPLETE CBC W/AUTO DIFF WBC    Reviewed

 

                    2010 12:00 AM    LIPID PANEL         Reviewed

 

                    2013 12:00 AM    Flu Injection 3 Years And Above NDC# [address]-88  [facility]    Reviewed



 

                    2013 12:00 AM    COMPLETE CBC W/AUTO DIFF WBC    Reviewed

 

                    2013 12:00 AM    ASSAY OF LITHIUM    Reviewed

 

                    2013 12:00 AM    METABOLIC PANEL TOTAL CA    Reviewed

 

                    4/3/2013 12:00 AM    THER/PROPH/DIAG INJ SC/IM    Reviewed

 

                    4/3/2013 12:00 AM    B12 Injection, Up to 1000 Mcg NDC#5301-9453-46    Reviewed

 

                    2013 12:00 AM    THER/PROPH/DIAG INJ SC/IM    Reviewed

 

                    2013 12:00 AM    B12 Injection, Up to 1000 Mcg NDC#5899-9188-02    Reviewed

 

                    2013 12:00 AM    THER/PROPH/DIAG INJ SC/IM    Reviewed

 

                    2013 12:00 AM    B12 Injection, Up to 1000 Mcg NDC#8314-6800-24    Reviewed

 

                    2013 12:00 AM    LIPID PANEL         Reviewed

 

                    2013 12:00 AM    VITAMIN D 25 HYDROXY    Reviewed

 

                    2013 12:00 AM    THER/PROPH/DIAG INJ SC/IM    Reviewed

 

                    3/6/2014 12:00 AM    THER/PROPH/DIAG INJ SC/IM    Reviewed

 

                    2014 12:00 AM    THER/PROPH/DIAG INJ SC/IM    Reviewed

 

                    2014 12:00 AM    B12 Injection, Up to 1000 Mcg NDC#6350-0922-64    Reviewed

 

                    2010 12:00 AM    SKIN FUNGI CULTURE    Reviewed

 

                    10/9/2010 12:00 AM    COMPREHEN METABOLIC PANEL    Reviewed

 

                    10/9/2010 12:00 AM    LIPID PANEL         Reviewed

 

                    2010 12:00 AM    THER/PROPH/DIAG INJ SC/IM    Reviewed

 

                    2010 12:00 AM    B12 Injection Ndc#[address]-49 ([place])    Reviewed

 

                    2010 12:00 AM    THER/PROPH/DIAG INJ SC/IM    Reviewed

 

                    2010 12:00 AM    Kenalog 40 Mg Im-Ndc#[address]-57 ([place])    Reviewed

 

                    10/15/2010 12:00 AM    FLU VACCINE 3 YRS & > IM    Reviewed

 

                    10/15/2010 12:00 AM    Admin.Of M/C Cov.Vaccine-Flu Vacc.    Reviewed

 

                    1/15/2011 12:00 AM    COMPLETE CBC W/AUTO DIFF WBC    Reviewed

 

                    1/15/2011 12:00 AM    COMPREHEN METABOLIC PANEL    Reviewed

 

                    1/15/2011 12:00 AM    LIPID PANEL         Reviewed

 

                    2014 12:00 AM    MAMMOGRAM SCREENING    Reviewed

 

                    2014 12:00 AM    Screening mammography, bilateral    Reviewed

 

                    7/10/2014 12:00 AM    THER/PROPH/DIAG INJ SC/IM    Reviewed

 

                    7/10/2014 12:00 AM    B12 Injection, Up to 1000 Mcg NDC#7811-6352-16    Reviewed

 

                    2011 12:00 AM    COMPLETE CBC W/AUTO DIFF WBC    Reviewed

 

                    2011 12:00 AM    COMPREHEN METABOLIC PANEL    Reviewed

 

                    2011 12:00 AM    LIPID PANEL         Reviewed

 

                    2014 12:00 AM    B12 Injection, Up to 1000 Mcg NDC#2577-7924-08    Reviewed

 

                    10/19/2014 12:00 AM    MAMMOGRAM SCREENING    Reviewed

 

                    10/19/2014 12:00 AM    Screening mammography, bilateral    Reviewed

 

                    10/16/2014 12:00 AM    COMPLETE CBC W/AUTO DIFF WBC    Reviewed

 

                    10/16/2014 12:00 AM    COMPREHEN METABOLIC PANEL    Reviewed

 

                    10/16/2014 12:00 AM    IMMUNOASSAY TUMOR     Reviewed

 

                    10/16/2014 12:00 AM    LIPID PANEL         Reviewed

 

                    10/16/2014 12:00 AM    ASSAY OF LITHIUM    Reviewed

 

                    10/16/2014 12:00 AM    MAMMOGRAM SCREENING    Reviewed

 

                    2011 12:00 AM    ASSAY OF PARATHORMONE    Reviewed

 

                    2011 12:00 AM    VITAMIN D 25 HYDROXY    Reviewed

 

                    2011 12:00 AM    ASSAY OF LITHIUM    Reviewed

 

                    2011 12:00 AM    METABOLIC PANEL TOTAL CA    Reviewed

 

                    2011 12:00 AM    CT HEAD/BRAIN W/O & W/DYE    Reviewed

 

                    3/23/2015 12:00 AM    PNEUMOCOCCAL VACC 13 GLENDY IM    Reviewed

 

                    3/23/2015 12:00 AM    Vitamin B12 injection    Reviewed

 

                    2011 12:00 AM    ASSAY OF LITHIUM    Reviewed

 

                    2011 12:00 AM    B12 Injection Ndc#[address]-58  [place]    Reviewed

 

                    2015 12:00 AM    THER/PROPH/DIAG INJ SC/IM    Reviewed

 

                    2015 12:00 AM    B12 Injection, Up to 1000 Mcg NDC#2185-2332-81    Reviewed

 

                    2015 12:00 AM    COMPLETE CBC W/AUTO DIFF WBC    Reviewed

 

                    2015 12:00 AM    COMPREHEN METABOLIC PANEL    Reviewed

 

                    2015 12:00 AM    LIPID PANEL         Reviewed

 

                    2015 12:00 AM    ASSAY OF LITHIUM    Reviewed

 

                    2011 12:00 AM    VIT D 1 25-DIHYDROXY    Reviewed

 

                    2011 12:00 AM    VITAMIN B-12        Reviewed

 

                    2015 12:00 AM    B12 Injection, Up to 1000 Mcg NDC#0674-4985-67    Reviewed

 

                    2015 12:00 AM    THER/PROPH/DIAG INJ SC/IM    Reviewed

 

                    2015 12:00 AM    B12 Injection, Up to 1000 Mcg NDC#1117-9376-44    Reviewed

 

                    2011 12:00 AM    THER/PROPH/DIAG INJ SC/IM    Reviewed

 

                    2011 12:00 AM    B12 Injection ([employer] [facility]) Ndc#2841-2943-18    Reviewed

 

                    2015 12:00 AM    THER/PROPH/DIAG INJ SC/IM    Reviewed

 

                    2015 12:00 AM    B12 Injection, Up to 1000 Mcg NDC#6678-7992-90    Reviewed







Results Summary







                          Data and Description      Results

 

                          2004 12:00 AM        Colonoscopy-Women and Men over 50 Normal 

 

                          2008 12:00 AM         Pap Smear Declined 

 

                          10/7/2009 12:00 AM        Cholest Cry Stone Ql .0 %LDLc SerPl-mCnc 123.0 mg/dLHDLc

 SerPl-mCnc 34.0 mg/dLTrigl SerPl-mCnc 190.0 mg/dLGlucose SerPl-mCnc 78.0 mg/dL

 

                          2009 12:00 AM        Mammogram -Women over 40 Normal HIV1+2 Ab Ser Ql no risk 

 

                          2010 8:47 AM         Dexa Bone Scan Refused Aspirin reccommended Contraindication 



 

                          2010 8:48 AM         Depression Done 

 

                          2010 12:00 AM         Foot Exam-Diabetic Done 

 

                          2010 12:00 AM         Cholest Cry Stone Ql .0 %LDLc SerPl-mCnc 126.0 mg/dLGlucose

 SerPl-mCnc 102.0 mg/dL

 

                          2010 8:45 AM          TRIGLYCERIDES 122.0 mg/dLCHOLESTEROL 186.0 mg/dLHDL 36.0 mg/dLLDL

 (CALC) 126.0 mg/dLGLUCOSE 102.0 mg/dLSODIUM 143.0 mmol/LPOTASSIUM 3.70 
mmol/LCHLORIDE 111.0 mmol/LCO2 23.0 mmol/LBUN 10.0 mg/dLCREATININE 0.80 
mg/dLSGOT/AST 12.0 IU/LSGPT/ALT 11.0 IU/LALK PHOS 65.0 IU/LTOTAL PROTEIN 7.20 
g/dLALBUMIN 3.90 g/dLTOTAL BILI 0.50 mg/dLCALCIUM 10.20 mg/dLeGFR >60 
mL/min/1.73 m2WBC 5.7 RBC 3.26 HGB 10.60 g/dLHCT 31.70 %MCV 97.0 fLMCH 32.50 
pgMCHC 33.40 g/dLRDW CV 13.30 %MPV 9.70 fLPLT 287 %NEUT 62.90 %%LYMP 21.80 
%%MONO 9.90 %%EOS 5.0 %%BASO 0.40 %#NEUT 3.56 #LYMP 1.23 #MONO 0.56 #EOS 0.28 
#BASO 0.02 

 

                          2010 12:00 AM        Glucose SerPl-mCnc 96.0 mg/dLCholest Cry Stone Ql .0 %LDLc

 SerPl-mCnc 146.0 mg/dL

 

                          2010 8:26 AM         TRIGLYCERIDES 106.0 mg/dLCHOLESTEROL 199.0 mg/dLHDL 32.0 mg/dLLDL

 (CALC) 146.0 mg/dLGLUCOSE 96.0 mg/dLSODIUM 143.0 mmol/LPOTASSIUM 4.0 
mmol/LCHLORIDE 113.0 mmol/LCO2 24.0 mmol/LBUN 13.0 mg/dLCREATININE 1.0 
mg/dLSGOT/AST 11.0 IU/LSGPT/ALT 6.0 IU/LALK PHOS 56.0 IU/LTOTAL PROTEIN 6.60 
g/dLALBUMIN 3.80 g/dLTOTAL BILI 0.50 mg/dLCALCIUM 9.30 mg/dLeGFR 57 

 

                          10/6/2010 12:00 AM        Cholest Cry Stone Ql .0 %LDLc SerPl-mCnc 111.0 mg/dLGlucose

 SerPl-mCnc 81.0 mg/dL

 

                          10/6/2010 2:45 PM         TRIGLYCERIDES 123.0 mg/dLCHOLESTEROL 178.0 mg/dLHDL 42.0 mg/dLLDL

 (CALC) 111.0 mg/dLGLUCOSE 81.0 mg/dLSODIUM 139.0 mmol/LPOTASSIUM 4.10 
mmol/LCHLORIDE 106.0 mmol/LCO2 24.0 mmol/LBUN 13.0 mg/dLCREATININE 0.90 
mg/dLSGOT/AST 13.0 IU/LSGPT/ALT 11.0 IU/LALK PHOS 61.0 IU/LTOTAL PROTEIN 7.10 
g/dLALBUMIN 3.90 g/dLTOTAL BILI 0.30 mg/dLCALCIUM 9.30 mg/dLeGFR >60 mL/min/1.73
 m2WBC 6.9 RBC 3.59 HGB 11.50 g/dLHCT 35.30 %MCV 98.0 fLMCH 32.0 pgMCHC 32.60 
g/dLRDW CV 12.90 %MPV 9.90 fLPLT 311 %NEUT 64.90 %%LYMP 22.50 %%MONO 7.20 %%EOS 
5.10 %%BASO 0.30 %#NEUT 4.45 #LYMP 1.54 #MONO 0.49 #EOS 0.35 #BASO 0.02 

 

                          2011 12:00 AM         Mammogram -Women over 40 Ordered 

 

                          2011 10:25 AM        TRIGLYCERIDES 111.0 mg/dLCHOLESTEROL 195.0 mg/dLHDL 43.0 mg/dLLDL

 (CALC) 130.0 mg/dLWBC 5.3 RBC 3.76 HGB 12.0 g/dLHCT 37.80 %.0 fLMCH 
31.90 pgMCHC 31.70 g/dLRDW CV 13.0 %MPV 9.70 fLPLT 259 %NEUT 69.0 %%LYMP 17.60 
%%MONO 8.30 %%EOS 4.70 %%BASO 0.40 %#NEUT 3.63 #LYMP 0.93 #MONO 0.44 #EOS 0.25 
#BASO 0.02 GLUCOSE 102.0 mg/dLSODIUM 146.0 mmol/LPOTASSIUM 4.20 mmol/LCHLORIDE 
113.0 mmol/LCO2 23.0 mmol/LBUN 15.0 mg/dLCREATININE 1.0 mg/dLSGOT/AST 12.0 
IU/LSGPT/ALT 17.0 IU/LALK PHOS 60.0 IU/LTOTAL PROTEIN 6.90 g/dLALBUMIN 4.20 
g/dLTOTAL BILI 0.40 mg/dLCALCIUM 9.70 mg/dLeGFR 57 

 

                          2011 11:49 AM        Cholest Cry Stone Ql .0 %LDLc SerPl-mCnc 130.0 mg/dLHDLc

 SerPl-mCnc 43.0 mg/dLTrigl SerPl-mCnc 111.0 mg/dLGlucose SerPl-mCnc 102.0 mg/dL

 

                          2011 11:52 AM        Pap Smear Declined 

 

                          2011 11:28 AM        Lithium 2.080 mmol/LGLUCOSE 102.0 mg/dLSODIUM 135.0 mmol/LPOTASSIUM

 3.90 mmol/LCHLORIDE 106.0 mmol/LCO2 21.0 mmol/LBUN 12.0 mg/dLCREATININE 1.30 
mg/dLCALCIUM 10.70 mg/dLeGFR 42 

 

                          2011 8:58 AM          Lithium 0.690 mmol/L

 

                          2011 2:38 PM         VITAMIN B12 3483.0 pg/mL

 

                          2013 3:35 PM          WBC 5.1 RBC 3.73 HGB 11.70 g/dLHCT 36.40 %MCV 98.0 fLMCH 31.40

 pgMCHC 32.10 g/dLRDW CV 13.10 %MPV 9.80 fLPLT 224 %NEUT 66.80 %%LYMP 19.10 
%%MONO 9.0 %%EOS 4.90 %%BASO 0.20 %#NEUT 3.42 #LYMP 0.98 #MONO 0.46 #EOS 0.25 
#BASO 0.01 GLUCOSE 88.0 mg/dLSODIUM 141.0 mmol/LPOTASSIUM 4.10 mmol/LCHLORIDE 
110.0 mmol/LCO2 22.0 mmol/LBUN 22.0 mg/dLCREATININE 1.10 mg/dLCALCIUM 9.80 
mg/dLeGFR 50 Lithium 0.760 mmol/L

 

                          2013 11:02 AM        TRIGLYCERIDES 106.0 mg/dLCHOLESTEROL 181.0 mg/dLHDL 46.0 mg/dLLDL

 (CALC) 114.0 mg/dLVITAMIN D 41.10 ng/mL

 

                          10/17/2014 10:10 AM       WBC 5.0 RBC 3.66 HGB 11.60 g/dLHCT 36.80 %.0 fLMCH 31.70

 pgMCHC 31.50 g/dLRDW CV 13.50 %MPV 10.10 fLPLT 209 %NEUT 69.20 %%LYMP 21.0 
%%MONO 6.40 %%EOS 3.20 %%BASO 0.20 %#NEUT 3.46 #LYMP 1.05 #MONO 0.32 #EOS 0.16 
#BASO 0.01 GLUCOSE 100.0 mg/dLSODIUM 148.0 mmol/LPOTASSIUM 3.90 mmol/LCHLORIDE 
114.0 mmol/LCO2 26.0 mmol/LBUN 12.0 mg/dLCREATININE 1.20 mg/dLSGOT/AST 9.0 
IU/LSGPT/ALT <6 IU/LALK PHOS 82.0 IU/LTOTAL PROTEIN 6.90 g/dLALBUMIN 4.0 
g/dLTOTAL BILI 0.40 mg/dLCALCIUM 10.50 mg/dLeGFR 45 TRIGLYCERIDES 96.0 
mg/dLCHOLESTEROL 155.0 mg/dLHDL 38.0 mg/dLLDL (CALC) 98.0 mg/dLLithium 0.850 
mmol/LCancer Antigen (CA) 125 8.30 U/mL

 

                          2015 10:25 AM        Lithium 0.790 mmol/LWBC 4.8 RBC 3.44 HGB 11.0 g/dLHCT 35.20 

%.0 fLMCH 32.0 pgMCHC 31.30 g/dLRDW CV 14.0 %MPV 9.30 fLPLT 210 %NEUT 
70.80 %%LYMP 17.20 %%MONO 8.10 %%EOS 3.50 %%BASO 0.40 %#NEUT 3.41 #LYMP 0.83 
#MONO 0.39 #EOS 0.17 #BASO 0.02 TRIGLYCERIDES 107.0 mg/dLCHOLESTEROL 174.0 
mg/dLHDL 43.0 mg/dLLDL (CALC) 110.0 mg/dLGLUCOSE 90.0 mg/dLSODIUM 145.0 
mmol/LPOTASSIUM 3.80 mmol/LCHLORIDE 115.0 mmol/LCO2 24.0 mmol/LBUN 17.0 mg
/dLCREATININE 1.30 mg/dLSGOT/AST 18.0 IU/LSGPT/ALT 17.0 IU/LALK PHOS 56.0 
IU/LTOTAL PROTEIN 6.70 g/dLALBUMIN 3.90 g/dLTOTAL BILI 0.40 mg/dLCALCIUM 9.80 
mg/dLeGFR 41 







History Of Immunizations







       Name    Date Admin    Mfg Name    Mfg Code    Trade Name    Lot#    Route    Inj    Vis Given    Vis

 Pub                                    CVX

 

        Influenza    2008    Not Entered    NE      Not Entered            Not Entered    Not Entered

                    1            999

 

          Pneumococcal    2008    [employer]    MSD       Pneumovax 23              Intramuscular

                Not Entered     1        999

 

           Influenza    10/15/2010    Morgan Everett Arely.    NOV        Fluvirin > 12 Years    

[id number]     Intramuscular    Left Deltoid    10/15/2010    2009    999

 

          Pneumococcal    3/23/2015    [employer]    [facility]       Prevnar 13    [id number]    Intramuscular

                Right Gluteous Medius    3/23/2015       2013       109







History of Past Illness







                    Name                Date of Onset       Comments

 

                    Peritoneal Neoplasm, Malignant                         

 

                    Hyperlipidemia                           

 

                    Bipolar disorder, unspecified                         

 

                    Artificial opening status; colostomy                         

 

                    B12 deficiency                           

 

                    Anemia, Pernicious                         

 

                    Arthritis unspecified                         

 

                    cervical cancer                          

 

                    Artificial opening status; colostomy    2010  1:10PM     

 

                    Bipolar disorder, unspecified    2010  1:10PM     

 

                    Hyperlipidemia      2010  1:10PM     

 

                    Anemia, Pernicious    2010  1:10PM     

 

                    Postoperative Follow-Up    2010  1:55PM     

 

                    Postoperative Follow-Up    Mar  8 2010 10:57AM     

 

                    Artificial opening status; colostomy    Mar  8 2010  1:19PM     

 

                    Peritoneal Neoplasm, Malignant    Mar  8 2010  1:19PM     

 

                    Artificial opening status; colostomy    2010  1:40PM     

 

                    Hyperlipidemia      2010  1:40PM     

 

                    Anemia, Pernicious    2010  1:40PM     

 

                    Peritoneal Neoplasm, Malignant    2010  1:40PM     

 

                    Arthritis unspecified    2010  1:40PM     

 

                    Anemia of Chronic Illness    2010  1:40PM     

 

                    Tinea corporis      2010  3:17PM     

 

                    Bipolar disorder, unspecified    2010  1:33PM     

 

                    Hyperlipidemia      2010  1:33PM     

 

                    Anemia, Pernicious    2010  1:33PM     

 

                    Peritoneal Neoplasm, Malignant    2010  1:33PM     

 

                    B12 deficiency      2010  1:33PM     

 

                    Ethmoidal Sinusitis, Acute    Sep 21 2010  3:53PM     

 

                    Wheezing            Sep 21 2010  3:53PM     

 

                    Flu                 Oct 15 2010  1:40PM     

 

                    Bipolar disorder, unspecified    Oct 15 2010  1:42PM     

 

                    Hyperlipidemia      Oct 15 2010  1:42PM     

 

                    Anemia, Pernicious    Oct 15 2010  1:42PM     

 

                    Peritoneal Neoplasm, Malignant    Oct 15 2010  1:42PM     

 

                    Bipolar disorder, unspecified    2011 12:01PM     

 

                    Hyperlipidemia      2011 12:01PM     

 

                    Anemia, Pernicious    2011 12:01PM     

 

                    Peritoneal Neoplasm, Malignant    2011 12:01PM     

 

                    Bipolar disorder, unspecified    Apr 15 2011 10:55AM     

 

                    Major Depression    2011 10:11AM     

 

                    Bipolar Disorder    2011 10:11AM     

 

                    Cancer              May 10 2011  4:16PM     

 

                    Major Depression    May 10 2011  3:16PM     

 

                    Bipolar Disorder    May 10 2011  3:16PM     

 

                    Hypercalcemia       May 23 2011  2:47PM     

 

                    Bipolar disorder, unspecified    May 23 2011  2:47PM     

 

                    Colon Cancer, Personal History    May 23 2011  2:47PM     

 

                    Bipolar Disorder    May 31 2011  4:39PM     

 

                    Depressive Disorder    2011 10:01AM     

 

                    Vitamin B12 deficiency    2011 10:01AM     

 

                    Vitamin D Deficiency    2011  5:07PM     

 

                    Anemia, Vitamin B12 Deficiency    2011  5:07PM     

 

                    B12 deficiency      2011  3:56PM     

 

                    Routine gynecological examination    Aug  4 2011  9:08AM     

 

                    Screening Examination for Breast Cancer    Aug  4 2011  9:08AM     

 

                    Tinea Corporis      Aug  4 2011  9:08AM     

 

                    Depressive Disorder    Sep 23 2011  8:47AM     

 

                    Contact Dermatitis    Sep 23 2011  8:47AM     

 

                    Anemia, Pernicious    Sep 23 2011  8:47AM     

 

                    B12 deficiency      Sep 23 2011  8:47AM     

 

                    B12 deficiency      Sep 27 2011  2:58PM     

 

                    B12 deficiency      Oct 20 2011  2:34PM     

 

                    Flu                 Dec  9 2011  3:16PM     

 

                    B12 deficiency      Dec  9 2011  3:17PM     

 

                    B12 deficiency      2012  4:52PM     

 

                    B12 deficiency      2012 11:10AM     

 

                    B12 deficiency      2012  3:37PM     

 

                    B12 deficiency      May  3 2012  4:10PM     

 

                    B12 deficiency      2012  2:54PM     

 

                    B12 deficiency      2012 11:23AM     

 

                    B12 deficiency      Aug  9 2012  2:08PM     

 

                    B12 deficiency      Sep  6 2012  4:36PM     

 

                    B12 deficiency      Oct 16 2012 10:23AM     

 

                    Flu                 Feb  4   3:11PM     

 

                    Bipolar disorder, unspecified    Feb  2013  2:48PM     

 

                    Anemia, Pernicious    Feb  2013  2:48PM     

 

                    B12 deficiency      Feb  2013  2:48PM     

 

                    Extrapyramidal abnormal movement disorder    Feb  4   2:48PM     

 

                    B12 deficiency      Apr  3 2013 12:03PM     

 

                    Bipolar disorder, unspecified    May  7 2013  1:31PM     

 

                    Anemia, Pernicious    May  7 2013  1:31PM     

 

                    B12 deficiency      May  7 2013  1:31PM     

 

                    Extrapyramidal abnormal movement disorder    May  7 2013  1:31PM     

 

                    B12 deficiency      2013  3:42PM     

 

                    B12 deficiency      2013  1:31PM     

 

                    Hyperlipidemia      Aug  7 2013 10:37AM     

 

                    Vitamin D Deficiency    Aug  7 2013 10:37AM     

 

                    Bipolar disorder, unspecified    Aug  7 2013 10:37AM     

 

                    Anemia, Pernicious    Aug  7 2013 10:37AM     

 

                    B12 deficiency      Aug  7 2013 10:37AM     

 

                    B12 deficiency      Sep 25 2013 11:15AM     

 

                    B12 deficiency      Dec 11 2013  3:16PM     

 

                    B12 deficiency      Mar  6 2014  1:48PM     

 

                    B12 deficiency      May 21 2014  3:17PM     

 

                    Screening Examination for Breast Cancer    2014  3:23PM     

 

                    Periumbilical abdominal pain    2014  3:23PM     

 

                    B12 deficiency      Jul 10 2014  2:52PM     

 

                    Anemia, Vitamin B12 Deficiency    Aug 13 2014  4:50PM     

 

                    Bipolar disorder    Oct 16 2014 11:13AM     

 

                    Hyperlipidemia      Oct 16 2014 11:13AM     

 

                    Anemia, Pernicious    Oct 16 2014 11:13AM     

 

                    Peritoneal Neoplasm, Malignant    Oct 16 2014 11:13AM     

 

                    Screening breast examination    Oct 16 2014 11:13AM     

 

                    Weight loss         Oct 16 2014 11:13AM     

 

                    Anemia, Pernicious    Mar 23 2015  2:57PM     

 

                    B12 deficiency      Mar 23 2015  2:57PM     

 

                    Need for Prevnar vaccine    Mar 23 2015  2:57PM     

 

                    Bipolar disorder    Mar 23 2015  2:57PM     

 

                    Hyperlipidemia      Mar 23 2015  2:57PM     

 

                    Anemia, Pernicious    Mar 23 2015  2:57PM     

 

                    Peritoneal Neoplasm, Malignant    Mar 23 2015  2:57PM     

 

                    B12 deficiency      May  4 2015  4:48PM     

 

                    Hyperlipidemia      May 13 2015  9:56AM     

 

                    Anemia              May 13 2015  9:56AM     

 

                    Bipolar disorder    May 13 2015  9:56AM     

 

                    Bipolar disorder    May 14 2015  3:27PM     

 

                    Hyperlipidemia      May 14 2015  3:27PM     

 

                    Anemia, Pernicious    May 14 2015  3:27PM     

 

                    Peritoneal Neoplasm, Malignant    May 14 2015  3:27PM     

 

                    B12 deficiency      2015  2:20PM     

 

                    B12 deficiency      2015 11:34AM     

 

                    B12 deficiency      Aug 18 2015  9:06AM     

 

                    Tinea Corporis      Sep 18 2015  8:54AM     

 

                    B12 deficiency      Sep 18 2015  8:54AM     







Payers







           Insurance Name    Company Name    Plan Name    Plan Number    Policy Number    Policy Group

 Number                                 Start Date

 

                    [facility]              [id number]              N/A

 

                    Medicare Part A    Medicare Part A              [id number]              N/A

 

                    Medicare Part B    [employer] [place]              [id number]              2006

 

                          [employer]    [employer]                 50 percent

                                                    2009







History of Encounters







                    Visit Date          Visit Type          Provider

 

                    2015           Office visit        Bhupinder Aspen DO

 

                    2015           Nurse visit         Bhupinder Aspen DO

 

                    2015            Nurse visit         Bhupinder Aspen DO

 

                    2015            Nurse visit         Bhupinder Aspen DO

 

                    2015           Office visit        Bhupinder Aspen DO

 

                    2015            Nurse visit         Bhupinder Aspen DO

 

                    3/23/2015           Office visit        Bhupinder Aspen DO

 

                    10/16/2014          Office visit        Bhupinder Aspen DO

 

                    2014           Nurse visit         Radha GREEN

 

                    7/10/2014           Nurse visit         Bhupinder Aspen DO

 

                    2014           Office visit        Bhupinder Aspen DO

 

                    2014           Nurse visit         Bhupinder Aspen DO

 

                    3/6/2014            Nurse visit         Bhupinder Aspen DO

 

                    [facility] MD

 

                    2013          Nurse visit         Bhupinder Aspen DO

 

                    2013           Nurse visit         Bhupinder Aspen DO

 

                    2013            Office visit        Bhupinder Aspen DO

 

                    2013            Nurse visit         Bhupinder Aspen DO

 

                    2013            Nurse visit         Bhupinder Aspen DO

 

                    2013            Office visit        Bhupinder Aspen DO

 

                    4/3/2013            Nurse visit         Bhupinder Aspen DO

 

                    2013            Office visit        Bhupinder Aspen DO

 

                    10/16/2012          Nurse visit         Bhupinder Aspen DO

 

                    2012            Nurse visit         Bhupinder Aspen DO

 

                    2012            Voided              Bhupinder Aspen DO

 

                    2012            Nurse visit         Bhupinder Aspen DO

 

                    2012            Nurse visit         Bhupinder Aspen DO

 

                    2012           Nurse visit         Bhupinder Aspen DO

 

                    5/3/2012            Nurse visit         Bhupinder Aspen DO

 

                    2012           Nurse visit         Bhupinder Aspen DO

 

                    2012           Nurse visit         Bhupinder Aspen DO

 

                    2012           Nurse visit         Bhupinder Aspen DO

 

                    2011           Nurse visit         Bhupinder Aspen DO

 

                    10/20/2011          Nurse visit         Bhupinder Aspen DO

 

                    2011           Office visit        Bhupinder Aspen DO

 

                    2011           Nurse visit         Radha GREEN

 

                    2011            Office visit        Bhupinder Aspen DO

 

                    2011           Nurse visit         Bhupinder Aspen DO

 

                    2011            Office visit        Bhupinder Aspen DO

 

                    2011           Office visit        Bhupinder Aspen DO

 

                    5/10/2011           Office visit        Bhupinder Aspen DO

 

                    2011           Office visit        Bhupinder Aspen DO

 

                    4/15/2011           Office visit        Devin Angel DO

 

                    2011           Office visit        Devin Angel DO

 

                    10/15/2010          Office visit        Devin Angel DO

 

                    2010           Office visit        Devin Angel DO

 

                    2010            Office visit        Devin Angel DO

 

                    2010           Office visit        Devin Angel DO

 

                    2010            Office visit        Devin Angel DO

 

                    3/8/2010            Office visit        Devin Masterson MD

 

                    2010            Surgery             Devin Masterson MD

 

                    2010            Office visit        Devin Angel DO

 

                    2010           Surgery             Devin Masterson MD

 

                    2010           Hospital            Devin Masterson MD

 

                    2010           [facility]            Devin Masterson MD

 

                    10/22/2009          Office visit        Devin Angel DO

## 2019-06-26 NOTE — XMS REPORT
MU2 Ambulatory Summary

                             Created on: 2016



Pauline Gan

External Reference #: [id number]

: 1950

Sex: Female



Demographics







                          Address                   [address]

[place]  [address]

 

                          Home Phone                [phone]

 

                          Preferred Language        English

 

                          Marital Status            Legally 

 

                          Anabaptism Affiliation     Unknown

 

                          Race                      White

 

                          Ethnic Group              Not  or 





Author







                          Author                    Bhupinder Louise

 

                          [facility]

 

                          Address                   [address]

[place]  [id number]



 

                          Phone                     [phone]







Care Team Providers







                    Care Team Member Name    Role                Phone

 

                    Bhupinder Louise    PCP                 Unavailable







Allergies and Adverse Reactions







                    Name                Reaction            Notes

 

                    NO KNOWN DRUG ALLERGIES                         







Plan of Treatment







             Planned Activity    Comments     Planned Date    Planned Time    Plan/Goal

 

             TTE W/DOPPLER COMPLETE                 2016    12:00 AM      

 

             COMPLETE CBC W/AUTO DIFF WBC                 2013     12:00 AM      

 

             ASSAY OF LITHIUM                 2013     12:00 AM      

 

             METABOLIC PANEL TOTAL CA                 2013     12:00 AM      

 

             VITAMIN B-12                 2013     12:00 AM      

 

             ASSAY OF FOLIC ACID SERUM                 2013     12:00 AM      

 

             THER/PROPH/DIAG INJ SC/IM                 2013    12:00 AM      







Medications







                                        Active 

 

             Name         Start Date    Estimated Completion Date    SIG          Comments

 

                Latuda 20 mg oral tablet                                    take 1 tablet (20 mg) by oral route once daily with

 food (at least 350 calories)            

 

             pravastatin 40 mg oral tablet    3/30/2015                 TAKE 1 TABLET BY MOUTH DAILY     

 

                Namenda XR 28 mg oral capsule,sprinkle,ER 24hr    2015                       take 1 capsule (28

 mg) by oral route once daily            

 

                Namenda XR 28 mg oral capsule,sprinkle,ER 24hr    2016                       take 1 capsule (28

 mg) by oral route once daily            

 

                triamcinolone acetonide 0.1 % topical cream    2016                        apply a thin layer to 

the affected area(s) by topical route 2 times per day     

 

                furosemide 40 mg oral tablet    2016      take 1 tablet (40 mg) by oral

 route once daily                        

 

                potassium chloride 10 mEq oral tablet extended release    2016                       take 1 tablet

 (10 meq) by oral route once daily       









                                         

 

             Name         Start Date    Expiration Date    SIG          Comments

 

             Reglan 10mg    3/29/2010    2010    one ac and hs     

 

                Keflex 500 mg oral capsule    2010       10/1/2010       take 1 capsule (500 mg) by oral

 route every 6 hours for 10 days         

 

                Bactrim -160 mg oral tablet    2011       take 1 tablet by oral route

 every 12 hours for 7 days               

 

                triamcinolone acetonide 0.1 % topical cream    2011      apply a thin

 layer to the affected area(s) by topical route 2 times per day     

 

                sertraline 100 mg oral tablet    4/10/2012       5/10/2012       take 1.5 tablets by oral route

 daily for 30 days                       

 

                ergocalciferol (vitamin D2) 50,000 unit oral capsule    4/15/2013       2013       TAKE

 ONE CAPSULE BY MOUTH ONCE A WEEK        

 

                CYANOCOBALAM 1000MCGINJ 1000 milliliter    2013       INJECT 1ML INTRAMUSCULAR

 ONCE A MONTH                            

 

                pravastatin 40 mg oral tablet    3/25/2014       3/20/2015       TAKE ONE TABLET BY MOUTH EVERY

 DAY                                     

 

                          Zostavax (PF) 19,400 unit/0.65 mL subcutaneous suspension for reconstitution    3/23/2015

                    3/24/2015           inject 0.65 milliliter by subcutaneous route once     

 

                lithium carbonate 300 mg oral capsule    2015       take 1 capsule (300

 mg) by oral route 2 for 30 days         

 

                famciclovir 500 mg oral tablet    12/3/2015       12/10/2015      take 1 tablet (500 mg) by

 oral route every 8 hours for 7 days     









                                        Discontinued 

 

             Name         Start Date    Discontinued Date    SIG          Comments

 

                Tylenol 325 mg oral tablet                    2013        take 1 - 2 tablets (325 -650 mg) by oral

 route every 4-6 hours as needed         

 

                Calcium 600 + D(3) 600 mg(1,500mg) -400 unit oral tablet                    2011       take 1 tablet

 by oral route 2 times a day            no longer taking

 

                Vitamin B-12 1,000 mcg oral tablet extended release    2010       take 1

 tablet by oral route daily             no longer taking

 

                Antifungal (clotrimazole) 1 % topical cream    2010       apply to the 

affected and surrounding areas of skin by topical route 2 times per day morning 
and evening                              

 

                sertraline 100 mg oral tablet    5/10/2011       2011       take 2 tablets (200 mg) by 

oral route once daily                   discontinued by Dr. Serrano

 

                mirtazapine 15 mg oral tablet                    2011        take 1 tablet (15 mg) by oral route 

once daily before bedtime               Dr. Serrano

 

                mirtazapine 15 mg oral tablet                    2011        take 1 tablet (15 mg) by oral route 

once daily before bedtime               dc'd by Dr. Serrano

 

                Pristiq 50 mg oral tablet extended release 24 hr                    2013        take 1 tablet (50

 mg) by oral route once daily           Dr. Serrano

 

                Pristiq 50 mg oral tablet extended release 24 hr                    2013        take 1 tablet (50

 mg) by oral route once daily           dose updated

 

                Vitamin B-12 1,000 mcg/mL injection solution    2011        inject 1 milliliter

 (1,000 mcg) by intramuscular route once a month    on list already

 

                    syringe with needle 1 mL 25 gauge x 1" miscellaneous syringe    2011

                          use for injection once a month     

 

                clotrimazole 1 % topical cream    2011        apply to the affected and surrounding

 areas of skin by topical route 2 times per day in the morning and evening     

 

                Vitamin D2 50,000 unit oral capsule    2011        take 1 capsule (50,000

 unit) by oral route once weekly        generic on list

 

                Pravachol 40 mg oral tablet    2012        take 1 tablet (40 mg) by oral 

route once daily for 90 days            generic on list

 

                lithium carbonate 300 mg oral capsule    2012        take 1 capsule by oral

 route daily                            dose updated

 

                Pristiq 100 mg oral tablet extended release 24 hr                    4/10/2012       take 1 and 1/2 

tablet (150 mg) by oral route once daily    Mental Health provider

 

                Pristiq 100 mg oral tablet extended release 24 hr                    4/10/2012       take 1 and 1/2 

tablet (150 mg) by oral route once daily    Discontinued by Dr Efrain Knight at [facility]

 

                hydroxyzine HCl 50 mg oral tablet    10/16/2014      2015       take 1 tablet (50 mg) 

by oral route at bedtime                 

 

                fluconazole 100 mg oral tablet    2015       12/3/2015       take 1 tablet (100 mg) by 

oral route once a week                   

 

                ketoconazole 2 % topical cream    2015       12/3/2015       apply to the affected area(s)

 by topical route 2 times per day        

 

                prednisone 10 mg oral tablet    12/3/2015       2016        take 2 tablets (20 mg) by oral

 route once daily for 4 days 1 tablet daily for 4 days 0.5 tablet daily for 4 
days                                     







Problem List







                    Description         Status              Onset

 

                    Artificial opening status; colostomy    Active               

 

                    Bipolar disorder, unspecified    Active               

 

                    Hyperlipidemia      Active               

 

                    Peritoneal Neoplasm, Malignant    Active               

 

                    Anemia, Pernicious    Active               

 

                    Arthritis unspecified    Active               

 

                    B12 deficiency      Active               







Vital Signs







      Date    Time    BP-Sys(mm[Hg]    BP-Lynn(mm[Hg])    HR(bpm)    RR(rpm)    Temp    WT    HT    HC    BMI

                    BSA                 BMI Percentile      O2 Sat(%)

 

        2016    11:27:00 AM    148 mmHg    78 mmHg    90 bpm    20 rpm    98.2 F    153 lbs    69 in

                          22.59 kg/m2    1.84 m2                   96 %

 

        12/3/2015    9:50:00 AM    132 mmHg    70 mmHg    62 bpm    16 rpm    97.9 F    145 lbs    69 in

                          21.4125 kg/m    1.7894 m                 100 %

 

        2015    8:52:00 AM    132 mmHg    68 mmHg    52 bpm    20 rpm    97.8 F    141 lbs    69 in

                          20.82 kg/m2    1.76 m2                   100 %

 

        2015    3:25:00 PM    120 mmHg    62 mmHg    72 bpm    16 rpm    98.1 F    136 lbs    69 in

                          20.0835 kg/m    1.733 m                 98 %

 

       3/23/2015    2:55:00 PM    130 mmHg    76 mmHg    68 bpm    18 rpm    97 F    140 lbs    69 in    

                20.67 kg/m2     1.76 m2                         98 %

 

        10/16/2014    11:11:00 AM    120 mmHg    66 mmHg    77 bpm    20 rpm    98 F    130 lbs    69 in

                          19.1974 kg/m    1.6943 m                 100 %

 

        2014    3:21:00 PM    130 mmHg    66 mmHg    63 bpm    18 rpm    97.2 F    160 lbs    69 in

                          23.63 kg/m2    1.88 m2                   99 %

 

        2013    10:35:00 AM    132 mmHg    70 mmHg    66 bpm    20 rpm    98.1 F    157 lbs    69 in

                          23.1846 kg/m    1.862 m                  

 

        2013    1:29:00 PM    132 mmHg    70 mmHg    76 bpm    18 rpm    98.2 F    166 lbs    69 in 

                          24.51 kg/m2    1.91 m2                    

 

       2013    2:46:00 PM    128 mmHg    70 mmHg    76 bpm    16 rpm    98 F    160 lbs    69 in     

                23.6276 kg/m    1.8797 m                       

 

        2011    8:49:00 AM    128 mmHg    78 mmHg    70 bpm    18 rpm    97.9 F    164 lbs    69 in

                          24.22 kg/m2    1.90 m2                    

 

     2011    1:31:00 PM    132 mmHg    68 mmHg    84 bpm         97 F    167 lbs                        

                                         

 

        2011    9:09:00 AM    128 mmHg    70 mmHg    72 bpm    18 rpm    98.2 F    163 lbs    64 in 

                          27.98 kg/m2    1.83 m2                    

 

       2011    10:01:00 AM    132 mmHg    70 mmHg    72 bpm    18 rpm    98.2 F    154 lbs             

                                                                 

 

       2011    2:47:00 PM    128 mmHg    70 mmHg    72 bpm    18 rpm    97.8 F    156 lbs             

                                                                 

 

       5/10/2011    3:16:00 PM    144 mmHg    80 mmHg    72 bpm    18 rpm    98.2 F    158 lbs             

                                                                 

 

        2011    10:11:00 AM    132 mmHg    70 mmHg    70 bpm    18 rpm    98.2 F    168 lbs    69 in

                          24.81 kg/m2    1.93 m2                    

 

        4/15/2011    10:52:00 AM    110 mmHg    60 mmHg    75 bpm    16 rpm    97.5 F    172.375 lbs    

69 in                     25.4551 kg/m    1.951 m                 100 %

 

        2011    11:43:00 AM    120 mmHg    82 mmHg    75 bpm    16 rpm    97.2 F    178.5 lbs    69

 in                       26.36 kg/m2    1.99 m2                   100 %

 

        10/15/2010    1:32:00 PM    120 mmHg    70 mmHg    80 bpm    18 rpm    96.6 F    177 lbs    69 in

                          26.1381 kg/m    1.977 m                 100 %

 

        2010    3:50:00 PM    168 mmHg    100 mmHg    82 bpm    18 rpm    97.8 F    177.5 lbs    69

 in                       26.21 kg/m2    1.98 m2                   97 %

 

        2010    1:21:00 PM    140 mmHg    80 mmHg    59 bpm    16 rpm    97.6 F    173.25 lbs    69 

in                        25.5843 kg/m    1.956 m                 100 %

 

        2010    3:02:00 PM    140 mmHg    80 mmHg    61 bpm    16 rpm    97.6 F    173.125 lbs    69

 in                       25.57 kg/m2    1.96 m2                   99 %

 

        2010    1:23:00 PM    130 mmHg    80 mmHg    66 bpm    16 rpm    96.8 F    173 lbs    69 in 

                          25.5474 kg/m    1.9546 m                 100 %

 

        2010    12:58:00 PM    130 mmHg    88 mmHg    75 bpm    16 rpm    98.4 F    172.25 lbs    69

 in                       25.44 kg/m2    1.95 m2                   100 %







Social History







                    Name                Description         Comments

 

                    denies alcohol use                         

 

                    denies smoking                           

 

                    Denies illicit substance abuse                         

 

                    retired                                 direct care

 

                    Single                                   

 

                    Exercises regularly                         

 

                    Attended some college                         

 

                    Tobacco             Never smoker         







History of Procedures







                    Date Ordered        Description         Order Status

 

                    2010 12:00 AM    COMPREHEN METABOLIC PANEL    Reviewed

 

                    2010 12:00 AM    COMPLETE CBC W/AUTO DIFF WBC    Reviewed

 

                    2010 12:00 AM    LIPID PANEL         Reviewed

 

                          2015 12:00 AM        B12 Injection, Up to 1000 Mcg NDC#0859-1532-86 [facility] Medicare 

                                        Reviewed

 

                    2011 12:00 AM    MAMMOGRAM SCREENING    Reviewed

 

                    2011 12:00 AM    CYTOPATH C/V THIN LAYER    Reviewed

 

                    2011 12:00 AM    B12 Injection 1 cc NDC#[address]-23    Reviewed

 

                    2015 12:00 AM    THER/PROPH/DIAG INJ SC/IM    Reviewed

 

                    2015 12:00 AM    B12 Injection, Up to 1000 Mcg NDC#3169-3222-20    Reviewed

 

                    2011 12:00 AM    THER/PROPH/DIAG INJ SC/IM    Reviewed

 

                    2011 12:00 AM    B12 Injection([facility]) Ndc#0363-9750-38-    Reviewed

 

                    2015 12:00 AM    THER/PROPH/DIAG INJ SC/IM    Reviewed

 

                    2015 12:00 AM    B12 Injection, Up to 1000 Mcg NDC#7037-8671-24    Reviewed

 

                    10/20/2011 12:00 AM    THER/PROPH/DIAG INJ SC/IM    Reviewed

 

                    10/20/2011 12:00 AM    B12 Injection([facility]) Ndc#6672-4970-52-    Reviewed

 

                    2016 12:00 AM    THER/PROPH/DIAG INJ SC/IM    Reviewed

 

                    2016 12:00 AM    B12 Injection, Up to 1000 Mcg NDC#6240-9880-61    Reviewed

 

                    3/14/2016 12:00 AM    VITAMIN B-12        Reviewed

 

                    3/15/2016 12:00 AM    THER/PROPH/DIAG INJ SC/IM    Reviewed

 

                    3/15/2016 12:00 AM    B12 Injection, Up to 1000 Mcg NDC#9014-8027-23    Reviewed

 

                    2011 12:00 AM    ***Immunization administration, Medicare flu    Reviewed

 

                    2011 12:00 AM    Fluzone ** MEDICARE Only **    Reviewed

 

                    2011 12:00 AM    THER/PROPH/DIAG INJ SC/IM    Reviewed

 

                    2011 12:00 AM    B12 Injection ([facility]) Ndc#9324-5529-88    Reviewed

 

                    2016 12:00 AM    B12 Injection, Up to 1000 Mcg NDC#5263-3400-98 [facility] Medicare    

Reviewed

 

                    2012 12:00 AM    THER/PROPH/DIAG INJ SC/IM    Reviewed

 

                    2012 12:00 AM    B12 Injection ([employer] [facility]) Ndc#3839-5540-72    Reviewed

 

                    2012 12:00 AM    THER/PROPH/DIAG INJ SC/IM    Reviewed

 

                    2012 12:00 AM    B12 Injection([place]) Ndc#4229-4699-95-    Reviewed

 

                    5/3/2012 12:00 AM    THER/PROPH/DIAG INJ SC/IM    Reviewed

 

                    5/3/2012 12:00 AM    B12 Injection([place]) Ndc#6001-1864-32-    Reviewed

 

                    2012 12:00 AM    IMMUNOTHERAPY INJECTIONS    Reviewed

 

                    2012 12:00 AM    B12 Injection([place]) Ndc#4690-7180-38-    Reviewed

 

                    2012 12:00 AM    THER/PROPH/DIAG INJ SC/IM    Reviewed

 

                    2012 12:00 AM    B12 Injection, Up to 1000 Mcg NDC#6662-4370-20    Reviewed

 

                    2012 12:00 AM    THER/PROPH/DIAG INJ SC/IM    Reviewed

 

                    2012 12:00 AM    B12 Injection, Up to 1000 Mcg NDC#4412-2342-58    Reviewed

 

                    2012 12:00 AM    THER/PROPH/DIAG INJ SC/IM    Reviewed

 

                    2012 12:00 AM    B12 Injection, Up to 1000 Mcg NDC#4101-1733-96    Reviewed

 

                    10/16/2012 12:00 AM    THER/PROPH/DIAG INJ SC/IM    Reviewed

 

                    10/16/2012 12:00 AM    B12 Injection, Up to 1000 Mcg NDC#4556-5197-38    Reviewed

 

                    2010 12:00 AM    COMPREHEN METABOLIC PANEL    Reviewed

 

                    2010 12:00 AM    COMPLETE CBC W/AUTO DIFF WBC    Reviewed

 

                    2010 12:00 AM    LIPID PANEL         Reviewed

 

                    2013 12:00 AM    Flu Injection 3 Years And Above NDC# [address]-16  [facility]    Reviewed



 

                    2013 12:00 AM    COMPLETE CBC W/AUTO DIFF WBC    Reviewed

 

                    2013 12:00 AM    ASSAY OF LITHIUM    Reviewed

 

                    2013 12:00 AM    METABOLIC PANEL TOTAL CA    Reviewed

 

                    4/3/2013 12:00 AM    THER/PROPH/DIAG INJ SC/IM    Reviewed

 

                    4/3/2013 12:00 AM    B12 Injection, Up to 1000 Mcg NDC#5454-1563-78    Reviewed

 

                    2013 12:00 AM    THER/PROPH/DIAG INJ SC/IM    Reviewed

 

                    2013 12:00 AM    B12 Injection, Up to 1000 Mcg NDC#6721-7636-21    Reviewed

 

                    2013 12:00 AM    THER/PROPH/DIAG INJ SC/IM    Reviewed

 

                    2013 12:00 AM    B12 Injection, Up to 1000 Mcg NDC#7118-4188-05    Reviewed

 

                    2013 12:00 AM    LIPID PANEL         Reviewed

 

                    2013 12:00 AM    VITAMIN D 25 HYDROXY    Reviewed

 

                    2013 12:00 AM    THER/PROPH/DIAG INJ SC/IM    Reviewed

 

                    3/6/2014 12:00 AM    THER/PROPH/DIAG INJ SC/IM    Reviewed

 

                    2014 12:00 AM    THER/PROPH/DIAG INJ SC/IM    Reviewed

 

                    2014 12:00 AM    B12 Injection, Up to 1000 Mcg NDC#6949-5121-20    Reviewed

 

                    2010 12:00 AM    SKIN FUNGI CULTURE    Reviewed

 

                    10/9/2010 12:00 AM    COMPREHEN METABOLIC PANEL    Reviewed

 

                    10/9/2010 12:00 AM    LIPID PANEL         Reviewed

 

                    2010 12:00 AM    THER/PROPH/DIAG INJ SC/IM    Reviewed

 

                    2010 12:00 AM    B12 Injection Ndc#[address]-30 ([facility])    Reviewed

 

                    2010 12:00 AM    THER/PROPH/DIAG INJ SC/IM    Reviewed

 

                    2010 12:00 AM    Kenalog 40 Mg Im-Ndc#[address]-14 ([facility])    Reviewed

 

                    10/15/2010 12:00 AM    FLU VACCINE 3 YRS & > IM    Reviewed

 

                    10/15/2010 12:00 AM    Admin.Of M/C Cov.Vaccine-Flu Vacc.    Reviewed

 

                    1/15/2011 12:00 AM    COMPLETE CBC W/AUTO DIFF WBC    Reviewed

 

                    1/15/2011 12:00 AM    COMPREHEN METABOLIC PANEL    Reviewed

 

                    1/15/2011 12:00 AM    LIPID PANEL         Reviewed

 

                    2014 12:00 AM    MAMMOGRAM SCREENING    Reviewed

 

                    2014 12:00 AM    Screening mammography, bilateral    Reviewed

 

                    7/10/2014 12:00 AM    THER/PROPH/DIAG INJ SC/IM    Reviewed

 

                    7/10/2014 12:00 AM    B12 Injection, Up to 1000 Mcg NDC#1639-2668-50    Reviewed

 

                    2011 12:00 AM    COMPLETE CBC W/AUTO DIFF WBC    Reviewed

 

                    2011 12:00 AM    COMPREHEN METABOLIC PANEL    Reviewed

 

                    2011 12:00 AM    LIPID PANEL         Reviewed

 

                    2014 12:00 AM    B12 Injection, Up to 1000 Mcg NDC#0823-1538-31    Reviewed

 

                    10/19/2014 12:00 AM    MAMMOGRAM SCREENING    Reviewed

 

                    10/19/2014 12:00 AM    Screening mammography, bilateral    Reviewed

 

                    10/16/2014 12:00 AM    COMPLETE CBC W/AUTO DIFF WBC    Reviewed

 

                    10/16/2014 12:00 AM    COMPREHEN METABOLIC PANEL    Reviewed

 

                    10/16/2014 12:00 AM    IMMUNOASSAY TUMOR     Reviewed

 

                    10/16/2014 12:00 AM    LIPID PANEL         Reviewed

 

                    10/16/2014 12:00 AM    ASSAY OF LITHIUM    Reviewed

 

                    10/16/2014 12:00 AM    MAMMOGRAM SCREENING    Reviewed

 

                    2011 12:00 AM    ASSAY OF PARATHORMONE    Reviewed

 

                    2011 12:00 AM    VITAMIN D 25 HYDROXY    Reviewed

 

                    2011 12:00 AM    ASSAY OF LITHIUM    Reviewed

 

                    2011 12:00 AM    METABOLIC PANEL TOTAL CA    Reviewed

 

                    2011 12:00 AM    CT HEAD/BRAIN W/O & W/DYE    Reviewed

 

                    3/23/2015 12:00 AM    PNEUMOCOCCAL VACC 13 GLENDY IM    Reviewed

 

                    3/23/2015 12:00 AM    Vitamin B12 injection    Reviewed

 

                    2011 12:00 AM    ASSAY OF LITHIUM    Reviewed

 

                    2011 12:00 AM    B12 Injection Ndc#[address]-19  [facility]    Reviewed

 

                    2015 12:00 AM    THER/PROPH/DIAG INJ SC/IM    Reviewed

 

                    2015 12:00 AM    B12 Injection, Up to 1000 Mcg NDC#4597-0908-29    Reviewed

 

                    2015 12:00 AM    COMPLETE CBC W/AUTO DIFF WBC    Reviewed

 

                    2015 12:00 AM    COMPREHEN METABOLIC PANEL    Reviewed

 

                    2015 12:00 AM    LIPID PANEL         Reviewed

 

                    2015 12:00 AM    ASSAY OF LITHIUM    Reviewed

 

                    2011 12:00 AM    VIT D 1 25-DIHYDROXY    Reviewed

 

                    2011 12:00 AM    VITAMIN B-12        Reviewed

 

                    2015 12:00 AM    B12 Injection, Up to 1000 Mcg NDC#4512-9427-50    Reviewed

 

                    2015 12:00 AM    THER/PROPH/DIAG INJ SC/IM    Reviewed

 

                    2015 12:00 AM    B12 Injection, Up to 1000 Mcg NDC#1637-7460-03    Reviewed

 

                    2011 12:00 AM    THER/PROPH/DIAG INJ SC/IM    Reviewed

 

                    2011 12:00 AM    B12 Injection ([facility]) Ndc#0639-2189-21    Reviewed

 

                    2015 12:00 AM    THER/PROPH/DIAG INJ SC/IM    Reviewed

 

                    2015 12:00 AM    B12 Injection, Up to 1000 Mcg NDC#2652-5825-56    Reviewed







Results Summary







                          Data and Description      Results

 

                          2004 12:00 AM        Colonoscopy-Women and Men over 50 Normal 

 

                          2008 12:00 AM         Pap Smear Declined 

 

                          10/7/2009 12:00 AM        Cholest Cry Stone Ql .0 %LDLc SerPl-mCnc 123.0 mg/dLHDLc

 SerPl-mCnc 34.0 mg/dLTrigl SerPl-mCnc 190.0 mg/dLGlucose SerPl-mCnc 78.0 mg/dL

 

                          2009 12:00 AM        Mammogram -Women over 40 Normal HIV1+2 Ab Ser Ql no risk 

 

                          2010 8:47 AM         Dexa Bone Scan Refused Aspirin reccommended Contraindication 



 

                          2010 8:48 AM         Depression Done 

 

                          2010 12:00 AM         Foot Exam-Diabetic Done 

 

                          2010 12:00 AM         Cholest Cry Stone Ql .0 %LDLc SerPl-mCnc 126.0 mg/dLGlucose

 SerPl-mCnc 102.0 mg/dL

 

                          2010 8:45 AM          TRIGLYCERIDES 122.0 mg/dLCHOLESTEROL 186.0 mg/dLHDL 36.0 mg/dLLDL

 (CALC) 126.0 mg/dLGLUCOSE 102.0 mg/dLSODIUM 143.0 mmol/LPOTASSIUM 3.70 
mmol/LCHLORIDE 111.0 mmol/LCO2 23.0 mmol/LBUN 10.0 mg/dLCREATININE 0.80 
mg/dLSGOT/AST 12.0 IU/LSGPT/ALT 11.0 IU/LALK PHOS 65.0 IU/LTOTAL PROTEIN 7.20 
g/dLALBUMIN 3.90 g/dLTOTAL BILI 0.50 mg/dLCALCIUM 10.20 mg/dLeGFR >60 
mL/min/1.73 m2WBC 5.7 RBC 3.26 HGB 10.60 g/dLHCT 31.70 %MCV 97.0 fLMCH 32.50 
pgMCHC 33.40 g/dLRDW CV 13.30 %MPV 9.70 fLPLT 287 %NEUT 62.90 %%LYMP 21.80 
%%MONO 9.90 %%EOS 5.0 %%BASO 0.40 %#NEUT 3.56 #LYMP 1.23 #MONO 0.56 #EOS 0.28 
#BASO 0.02 

 

                          2010 12:00 AM        Glucose SerPl-nc 96.0 mg/dLCholest Cry Stone Ql .0 %LDLc

 [facility].0 [facility]/dL

 

                          2010 8:26 AM         TRIGLYCERIDES 106.0 mg/dLCHOLESTEROL 199.0 mg/dLHDL 32.0 mg/dLLDL

 (CALC) 146.0 mg/dLGLUCOSE 96.0 mg/dLSODIUM 143.0 mmol/LPOTASSIUM 4.0 
mmol/LCHLORIDE 113.0 mmol/LCO2 24.0 mmol/LBUN 13.0 mg/dLCREATININE 1.0 
mg/dLSGOT/AST 11.0 IU/LSGPT/ALT 6.0 IU/LALK PHOS 56.0 IU/LTOTAL PROTEIN 6.60 
g/dLALBUMIN 3.80 g/dLTOTAL BILI 0.50 mg/dLCALCIUM 9.30 mg/dLeGFR 57 

 

                          10/6/2010 12:00 AM        Cholest Cry Stone Ql .0 %LDLc SerPl-mCnc 111.0 mg/dLGlucose

 SerPl-mCnc 81.0 mg/dL

 

                          10/6/2010 2:45 PM         TRIGLYCERIDES 123.0 mg/dLCHOLESTEROL 178.0 mg/dLHDL 42.0 mg/dLLDL

 (CALC) 111.0 mg/dLGLUCOSE 81.0 mg/dLSODIUM 139.0 mmol/LPOTASSIUM 4.10 
mmol/LCHLORIDE 106.0 mmol/LCO2 24.0 mmol/LBUN 13.0 mg/dLCREATININE 0.90 
mg/dLSGOT/AST 13.0 IU/LSGPT/ALT 11.0 IU/LALK PHOS 61.0 IU/LTOTAL PROTEIN 7.10 
g/dLALBUMIN 3.90 g/dLTOTAL BILI 0.30 mg/dLCALCIUM 9.30 mg/dLeGFR >60 mL/min/1.73
 m2WBC 6.9 RBC 3.59 HGB 11.50 g/dLHCT 35.30 %MCV 98.0 fLMCH 32.0 pgMCHC 32.60 
g/dLRDW CV 12.90 %MPV 9.90 fLPLT 311 %NEUT 64.90 %%LYMP 22.50 %%MONO 7.20 %%EOS 
5.10 %%BASO 0.30 %#NEUT 4.45 #LYMP 1.54 #MONO 0.49 #EOS 0.35 #BASO 0.02 

 

                          2011 12:00 AM         Mammogram -Women over 40 Ordered 

 

                          2011 10:25 AM        TRIGLYCERIDES 111.0 mg/dLCHOLESTEROL 195.0 mg/dLHDL 43.0 mg/dLLDL

 (CALC) 130.0 mg/dLWBC 5.3 RBC 3.76 HGB 12.0 g/dLHCT 37.80 %.0 fLMCH 
31.90 pgMCHC 31.70 g/dLRDW CV 13.0 %MPV 9.70 fLPLT 259 %NEUT 69.0 %%LYMP 17.60 
%%MONO 8.30 %%EOS 4.70 %%BASO 0.40 %#NEUT 3.63 #LYMP 0.93 #MONO 0.44 #EOS 0.25 
#BASO 0.02 GLUCOSE 102.0 mg/dLSODIUM 146.0 mmol/LPOTASSIUM 4.20 mmol/LCHLORIDE 
113.0 mmol/LCO2 23.0 mmol/LBUN 15.0 mg/dLCREATININE 1.0 mg/dLSGOT/AST 12.0 
IU/LSGPT/ALT 17.0 IU/LALK PHOS 60.0 IU/LTOTAL PROTEIN 6.90 g/dLALBUMIN 4.20 
g/dLTOTAL BILI 0.40 mg/dLCALCIUM 9.70 mg/dLeGFR 57 

 

                          2011 11:49 AM        Cholest Cry Stone Ql .0 %LDLc SerPl-mCnc 130.0 mg/dLHDLc

 SerPl-mCnc 43.0 mg/dLTrigl SerPl-mCnc 111.0 mg/dLGlucose SerPl-mCnc 102.0 mg/dL

 

                          2011 11:52 AM        Pap Smear Declined 

 

                          2011 11:28 AM        Lithium 2.080 mmol/LGLUCOSE 102.0 mg/dLSODIUM 135.0 mmol/LPOTASSIUM

 3.90 mmol/LCHLORIDE 106.0 mmol/LCO2 21.0 mmol/LBUN 12.0 mg/dLCREATININE 1.30 
mg/dLCALCIUM 10.70 mg/dLeGFR 42 

 

                          2011 8:58 AM          Lithium 0.690 mmol/L

 

                          2011 2:38 PM         VITAMIN B12 3483.0 pg/mL

 

                          2013 3:35 PM          WBC 5.1 RBC 3.73 HGB 11.70 g/dLHCT 36.40 %MCV 98.0 fLMCH 31.40

 pgMCHC 32.10 g/dLRDW CV 13.10 %MPV 9.80 fLPLT 224 %NEUT 66.80 %%LYMP 19.10 
%%MONO 9.0 %%EOS 4.90 %%BASO 0.20 %#NEUT 3.42 #LYMP 0.98 #MONO 0.46 #EOS 0.25 
#BASO 0.01 GLUCOSE 88.0 mg/dLSODIUM 141.0 mmol/LPOTASSIUM 4.10 mmol/LCHLORIDE 
110.0 mmol/LCO2 22.0 mmol/LBUN 22.0 mg/dLCREATININE 1.10 mg/dLCALCIUM 9.80 
mg/dLeGFR 50 Lithium 0.760 mmol/L

 

                          2013 11:02 AM        TRIGLYCERIDES 106.0 mg/dLCHOLESTEROL 181.0 mg/dLHDL 46.0 mg/dLLDL

 (CALC) 114.0 mg/dLVITAMIN D 41.10 ng/mL

 

                          10/17/2014 10:10 AM       WBC 5.0 RBC 3.66 HGB 11.60 g/dLHCT 36.80 %.0 fLMCH 31.70

 pgMCHC 31.50 g/dLRDW CV 13.50 %MPV 10.10 fLPLT 209 %NEUT 69.20 %%LYMP 21.0 
%%MONO 6.40 %%EOS 3.20 %%BASO 0.20 %#NEUT 3.46 #LYMP 1.05 #MONO 0.32 #EOS 0.16 
#BASO 0.01 GLUCOSE 100.0 mg/dLSODIUM 148.0 mmol/LPOTASSIUM 3.90 mmol/LCHLORIDE 
114.0 mmol/LCO2 26.0 mmol/LBUN 12.0 mg/dLCREATININE 1.20 mg/dLSGOT/AST 9.0 
IU/LSGPT/ALT <6 IU/LALK PHOS 82.0 IU/LTOTAL PROTEIN 6.90 g/dLALBUMIN 4.0 
g/dLTOTAL BILI 0.40 mg/dLCALCIUM 10.50 mg/dLeGFR 45 TRIGLYCERIDES 96.0 
mg/dLCHOLESTEROL 155.0 mg/dLHDL 38.0 mg/dLLDL (CALC) 98.0 mg/dLLithium 0.850 
mmol/LCancer Antigen (CA) 125 8.30 U/mL

 

                          2015 10:25 AM        Lithium 0.790 mmol/LWBC 4.8 RBC 3.44 HGB 11.0 g/dLHCT 35.20 

%.0 fLMCH 32.0 pgMCHC 31.30 g/dLRDW CV 14.0 %MPV 9.30 fLPLT 210 %NEUT 
70.80 %%LYMP 17.20 %%MONO 8.10 %%EOS 3.50 %%BASO 0.40 %#NEUT 3.41 #LYMP 0.83 
#MONO 0.39 #EOS 0.17 #BASO 0.02 TRIGLYCERIDES 107.0 mg/dLCHOLESTEROL 174.0 
mg/dLHDL 43.0 mg/dLLDL (CALC) 110.0 mg/dLGLUCOSE 90.0 mg/dLSODIUM 145.0 
mmol/LPOTASSIUM 3.80 mmol/LCHLORIDE 115.0 mmol/LCO2 24.0 mmol/LBUN 17.0 mg
/dLCREATININE 1.30 mg/dLSGOT/AST 18.0 IU/LSGPT/ALT 17.0 IU/LALK PHOS 56.0 
IU/LTOTAL PROTEIN 6.70 g/dLALBUMIN 3.90 g/dLTOTAL BILI 0.40 mg/dLCALCIUM 9.80 
mg/dLeGFR 41 

 

                          2015 8:50 AM        WBC 5.8 RBC 3.29 HGB 10.70 g/dLHCT 34.0 %.0 fLMCH 32.50

 pgMCHC 31.50 g/dLRDW CV 13.60 %MPV 9.60 fLPLT 223 %NEUT 69.60 %%LYMP 18.90 
%%MONO 8.50 %%EOS 2.80 %%BASO 0.20 %#NEUT 4.03 #LYMP 1.09 #MONO 0.49 #EOS 0.16 
#BASO 0.01 Lithium 0.620 mmol/LGLUCOSE 83.0 mg/dLSODIUM 139.0 mmol/LPOTASSIUM 
3.90 mmol/LCHLORIDE 109.0 mmol/LCO2 22.0 mmol/LBUN 19.0 mg/dLCREATININE 1.40 
mg/dLSGOT/AST 19.0 IU/LSGPT/ALT 21.0 IU/LALK PHOS 55.0 IU/LTOTAL PROTEIN 6.50 
g/dLALBUMIN 3.90 g/dLTOTAL BILI 0.50 mg/dLCALCIUM 9.60 mg/dLeGFR 38 
TRIGLYCERIDES 121.0 mg/dLCHOLESTEROL 192.0 mg/dLHDL 51.0 mg/dLLDL 121.0 mg/dLTSH
 1.210 uIU/mLHemoglobin A1c 5.40 %

 

                          3/15/2016 8:08 AM         VITAMIN B12 696.0 pg/mL

 

                          3/23/2016 8:26 AM         WBC 7.0 RBC 3.61 HGB 11.80 g/dLHCT 37.70 %.0 fLMCH 32.70

 pgMCHC 31.30 g/dLRDW CV 12.50 %MPV 10.0 fLPLT 207 %NEUT 73.60 %%LYMP 16.40 
%%MONO 6.60 %%EOS 3.0 %%BASO 0.30 %#NEUT 5.15 #LYMP 1.15 #MONO 0.46 #EOS 0.21 
#BASO 0.02 Lithium 0.940 mmol/LGLUCOSE 108.0 mg/dLSODIUM 143.0 mmol/LPOTASSIUM 
4.30 mmol/LCHLORIDE 110.0 mmol/LCO2 27.0 mmol/LBUN 16.0 mg/dLCREATININE 1.60 
mg/dLSGOT/AST 13.0 IU/LSGPT/ALT 7.0 IU/LALK PHOS 71.0 IU/LTOTAL PROTEIN 6.80 
g/dLALBUMIN 4.0 g/dLTOTAL BILI 0.20 mg/dLCALCIUM 10.40 mg/dLeGFR 32 
TRIGLYCERIDES 113.0 mg/dLCHOLESTEROL 169.0 mg/dLHDL 42.0 mg/dLLDL (CALC) 104.0 
mg/dLTSH 2.20 uIU/mLHemoglobin A1c 5.20 %

 

                          3/25/2016 9:17 AM         COLOR YELLOW APPEARANCE CLEAR SPEC GRAV 1.010 pH 7.0 PROTEIN 

NEGATIVE GLUCOSE NEGATIVE mg/dLKETONE NEGATIVE BILIRUBIN NEGATIVE BLOOD NEGATIVE
 NITRITE NEGATIVE LEUK SCREEN SMALL CASTS/LPF NEGATIVE /LPFCRYSTALS NEGATIVE 
MUCOUS THRDS NEGATIVE BACTERIA NEGATIVE EPITH CELLS FEW SQUAMOUS /HPFTRICHOMONAS
 NEGATIVE YEAST NEGATIVE 







History Of Immunizations







       Name    Date Admin    Mfg Name    Mfg Code    Trade Name    Lot#    Route    Inj    Vis Given    Vis

 Pub                                    CVX

 

        Influenza    2008    Not Entered    NE      Not Entered            Not Entered    Not Entered

                    1            999

 

        X       12/19/2008    [employer]    MSD     Pneumovax 23            Intramuscular    Not Entered

                    1            999

 

           Influenza    10/15/2010    [employer]    NOV        Fluvirin > 12 Years    

[id number]     Intramuscular    Left Deltoid    10/15/2010    2009    999

 

          X         3/23/2015    [facility]    [id number]    Intramuscular

                Right Gluteous Medius    3/23/2015       2013       109







History of Past Illness







                    Name                Date of Onset       Comments

 

                    Peritoneal Neoplasm, Malignant                         

 

                    Hyperlipidemia                           

 

                    Bipolar disorder, unspecified                         

 

                    Artificial opening status; colostomy                         

 

                    B12 deficiency                           

 

                    Anemia, Pernicious                         

 

                    Arthritis unspecified                         

 

                    cervical cancer                          

 

                    Artificial opening status; colostomy    2010  1:10PM     

 

                    Bipolar disorder, unspecified    2010  1:10PM     

 

                    Hyperlipidemia      2010  1:10PM     

 

                    Anemia, Pernicious    2010  1:10PM     

 

                    Postoperative Follow-Up    2010  1:55PM     

 

                    Postoperative Follow-Up    Mar  8 2010 10:57AM     

 

                    Artificial opening status; colostomy    Mar  8 2010  1:19PM     

 

                    Peritoneal Neoplasm, Malignant    Mar  8 2010  1:19PM     

 

                    Artificial opening status; colostomy    2010  1:40PM     

 

                    Hyperlipidemia      2010  1:40PM     

 

                    Anemia, Pernicious    2010  1:40PM     

 

                    Peritoneal Neoplasm, Malignant    2010  1:40PM     

 

                    Arthritis unspecified    2010  1:40PM     

 

                    Anemia of Chronic Illness    2010  1:40PM     

 

                    Tinea corporis      2010  3:17PM     

 

                    Bipolar disorder, unspecified    2010  1:33PM     

 

                    Hyperlipidemia      2010  1:33PM     

 

                    Anemia, Pernicious    2010  1:33PM     

 

                    Peritoneal Neoplasm, Malignant    2010  1:33PM     

 

                    B12 deficiency      2010  1:33PM     

 

                    Ethmoidal Sinusitis, Acute    Sep 21 2010  3:53PM     

 

                    Wheezing            Sep 21 2010  3:53PM     

 

                    Flu                 Oct 15 2010  1:40PM     

 

                    Bipolar disorder, unspecified    Oct 15 2010  1:42PM     

 

                    Hyperlipidemia      Oct 15 2010  1:42PM     

 

                    Anemia, Pernicious    Oct 15 2010  1:42PM     

 

                    Peritoneal Neoplasm, Malignant    Oct 15 2010  1:42PM     

 

                    Bipolar disorder, unspecified    2011 12:01PM     

 

                    Hyperlipidemia      2011 12:01PM     

 

                    Anemia, Pernicious    2011 12:01PM     

 

                    Peritoneal Neoplasm, Malignant    2011 12:01PM     

 

                    Bipolar disorder, unspecified    Apr 15 2011 10:55AM     

 

                    Major Depression    2011 10:11AM     

 

                    Bipolar Disorder    2011 10:11AM     

 

                    Cancer              May 10 2011  4:16PM     

 

                    Major Depression    May 10 2011  3:16PM     

 

                    Bipolar Disorder    May 10 2011  3:16PM     

 

                    Hypercalcemia       May 23 2011  2:47PM     

 

                    Bipolar disorder, unspecified    May 23 2011  2:47PM     

 

                    Colon Cancer, Personal History    May 23 2011  2:47PM     

 

                    Bipolar Disorder    May 31 2011  4:39PM     

 

                    Depressive Disorder    2011 10:01AM     

 

                    Vitamin B12 deficiency    2011 10:01AM     

 

                    Vitamin D Deficiency    2011  5:07PM     

 

                    Anemia, Vitamin B12 Deficiency    2011  5:07PM     

 

                    B12 deficiency      2011  3:56PM     

 

                    Routine gynecological examination    Aug  4 2011  9:08AM     

 

                    Screening Examination for Breast Cancer    Aug  4 2011  9:08AM     

 

                    Tinea Corporis      Aug  4 2011  9:08AM     

 

                    Depressive Disorder    Sep 23 2011  8:47AM     

 

                    Contact Dermatitis    Sep 23 2011  8:47AM     

 

                    Anemia, Pernicious    Sep 23 2011  8:47AM     

 

                    B12 deficiency      Sep 23 2011  8:47AM     

 

                    B12 deficiency      Sep 27 2011  2:58PM     

 

                    B12 deficiency      Oct 20 2011  2:34PM     

 

                    Flu                 Dec  9 2011  3:16PM     

 

                    B12 deficiency      Dec  9 2011  3:17PM     

 

                    B12 deficiency      2012  4:52PM     

 

                    B12 deficiency      Feb 2012 11:10AM     

 

                    B12 deficiency      2012  3:37PM     

 

                    B12 deficiency      May  3 2012  4:10PM     

 

                    B12 deficiency      2012  2:54PM     

 

                    B12 deficiency      2012 11:23AM     

 

                    B12 deficiency      Aug  9 2012  2:08PM     

 

                    B12 deficiency      Sep  6 2012  4:36PM     

 

                    B12 deficiency      Oct 16 2012 10:23AM     

 

                    Flu                 Feb  4   3:11PM     

 

                    Bipolar disorder, unspecified    Feb  2013  2:48PM     

 

                    Anemia, Pernicious    Feb  4   2:48PM     

 

                    B12 deficiency      Feb  4   2:48PM     

 

                    Extrapyramidal abnormal movement disorder    Feb  4   2:48PM     

 

                    B12 deficiency      Apr  3 2013 12:03PM     

 

                    Bipolar disorder, unspecified    May  7 2013  1:31PM     

 

                    Anemia, Pernicious    May  7 2013  1:31PM     

 

                    B12 deficiency      May  7 2013  1:31PM     

 

                    Extrapyramidal abnormal movement disorder    May  7 2013  1:31PM     

 

                    B12 deficiency      2013  3:42PM     

 

                    B12 deficiency      2013  1:31PM     

 

                    Hyperlipidemia      Aug  7 2013 10:37AM     

 

                    Vitamin D Deficiency    Aug  7 2013 10:37AM     

 

                    Bipolar disorder, unspecified    Aug  7 2013 10:37AM     

 

                    Anemia, Pernicious    Aug  7 2013 10:37AM     

 

                    B12 deficiency      Aug  7 2013 10:37AM     

 

                    B12 deficiency      Sep 25 2013 11:15AM     

 

                    B12 deficiency      Dec 11 2013  3:16PM     

 

                    B12 deficiency      Mar  6 2014  1:48PM     

 

                    B12 deficiency      May 21 2014  3:17PM     

 

                    Screening Examination for Breast Cancer    2014  3:23PM     

 

                    Periumbilical abdominal pain    2014  3:23PM     

 

                    B12 deficiency      Jul 10 2014  2:52PM     

 

                    Anemia, Vitamin B12 Deficiency    Aug 13 2014  4:50PM     

 

                    Bipolar disorder    Oct 16 2014 11:13AM     

 

                    Hyperlipidemia      Oct 16 2014 11:13AM     

 

                    Anemia, Pernicious    Oct 16 2014 11:13AM     

 

                    Peritoneal Neoplasm, Malignant    Oct 16 2014 11:13AM     

 

                    Screening breast examination    Oct 16 2014 11:13AM     

 

                    Weight loss         Oct 16 2014 11:13AM     

 

                    Anemia, Pernicious    Mar 23 2015  2:57PM     

 

                    B12 deficiency      Mar 23 2015  2:57PM     

 

                    Need for Prevnar vaccine    Mar 23 2015  2:57PM     

 

                    Bipolar disorder    Mar 23 2015  2:57PM     

 

                    Hyperlipidemia      Mar 23 2015  2:57PM     

 

                    Anemia, Pernicious    Mar 23 2015  2:57PM     

 

                    Peritoneal Neoplasm, Malignant    Mar 23 2015  2:57PM     

 

                    B12 deficiency      May  4 2015  4:48PM     

 

                    Hyperlipidemia      May 13 2015  9:56AM     

 

                    Anemia              May 13 2015  9:56AM     

 

                    Bipolar disorder    May 13 2015  9:56AM     

 

                    Bipolar disorder    May 14 2015  3:27PM     

 

                    Hyperlipidemia      May 14 2015  3:27PM     

 

                    Anemia, Pernicious    May 14 2015  3:27PM     

 

                    Peritoneal Neoplasm, Malignant    May 14 2015  3:27PM     

 

                    B12 deficiency      2015  2:20PM     

 

                    B12 deficiency      2015 11:34AM     

 

                    B12 deficiency      Aug 18 2015  9:06AM     

 

                    Tinea Corporis      Sep 18 2015  8:54AM     

 

                    B12 deficiency      Sep 18 2015  8:54AM     

 

                    B12 deficiency      2015 10:28AM     

 

                    Herpes zoster without complication    Dec  3 2015  9:52AM     

 

                    B12 deficiency      Dec 23 2015 11:21AM     

 

                    B12 deficiency      2016  4:51PM     

 

                    Vitamin B 12 deficiency    Mar 14 2016  5:35PM     

 

                    B12 deficiency      Mar 15 2016 12:14PM     

 

                    B12 deficiency      May  5 2016 11:30AM     

 

                    Edema               May  5 2016 11:30AM     

 

                    Dermatitis          May  5 2016 11:30AM     

 

                    Edema               May 17 2016  8:38AM     

 

                    Shortness of breath    May 17 2016  8:38AM     







Payers







           Insurance Name    Company Name    Plan Name    Plan Number    Policy Number    Policy Group

 Number                                 Start Date

 

                    Medicare Part A    Medicare [facility]              [id number]              N/A

 

                          [employer]    [employer]                 [id number]

                                                    

 

                    Medicare Part A    Medicare - Lab/Xray              [id number]              2006

 

                    Medicare Part B    [employer] [place]              [id number]              2006

 

                          [employer]    [employer]                 50 percent

                                                    2009

 

                    [facility]              [id number]              N/A

 

                    Medicare Part A    Medicare Part A              [id number]              N/A

 

                    Medicare Part A    Medicare Part A              [id number]              2006









History of Encounters







                    Visit Date          Visit Type          Provider

 

                    2016            Office visit        Bhupinder Aspen DO

 

                    3/15/2016           Nurse visit         Bhupinder Aspen DO

 

                    2016            Nurse visit         Bhupinder Aspen DO

 

                    2015          Nurse visit         Bhupinder Aspen DO

 

                    12/3/2015           Office visit        Bhupinder Aspen DO

 

                    2015          Nurse visit         Bhupinder Aspen DO

 

                    2015           Office visit        Bhupinder Aspen DO

 

                    2015           Nurse visit         Bhupinder Aspen DO

 

                    2015            Nurse visit         Bhupinder Aspen DO

 

                    2015            Nurse visit         Bhupinder Aspen DO

 

                    2015           Office visit        Bhupinder Aspen DO

 

                    2015            Nurse visit         Bhupinder Aspen DO

 

                    3/23/2015           Office visit        Bhupinder Aspen DO

 

                    10/16/2014          Office visit        Bhupinder Aspen DO

 

                    2014           Nurse visit         Radha GREEN

 

                    7/10/2014           Nurse visit         Bhupinder Aspen DO

 

                    2014           Office visit        Bhupinder Aspen DO

 

                    2014           Nurse visit         Bhupinder Aspen DO

 

                    3/6/2014            Nurse visit         Bhupinder Aspen DO

 

                    2014            [facility]            EARNEST Lopez MD

 

                    2013          Nurse visit         Bhupinder Aspen DO

 

                    2013           Nurse visit         Bhupinder Aspen DO

 

                    2013            Office visit        Bhupinder Aspen DO

 

                    2013            Nurse visit         Bhupinder Aspen DO

 

                    2013            Nurse visit         Bhupinder Aspen DO

 

                    2013            Office visit        Bhupinder Aspen DO

 

                    4/3/2013            Nurse visit         Bhupinder Aspen DO

 

                    2013            Office visit        Bhupinder Aspen DO

 

                    10/16/2012          Nurse visit         Bhupinder Aspen DO

 

                    2012            Nurse visit         Bhupinder Aspen DO

 

                    2012            Voided              Bhupinder Aspen DO

 

                    2012            Nurse visit         Bhupinder Aspen DO

 

                    2012            Nurse visit         Bhupinder Aspen DO

 

                    2012           Nurse visit         Bhupinder Aspen DO

 

                    5/3/2012            Nurse visit         Bhupinder Aspen DO

 

                    2012           Nurse visit         Bhupinder Aspen DO

 

                    2012           Nurse visit         Bhupinder Aspen DO

 

                    2012           Nurse visit         Bhupinder Aspen DO

 

                    2011           Nurse visit         Bhupinder Aspen DO

 

                    10/20/2011          Nurse visit         Bhupinder Aspen DO

 

                    2011           Office visit        Bhupinder Aspen DO

 

                    2011           Nurse visit         Radha GREEN

 

                    2011            Office visit        Bhupinder Aspen DO

 

                    2011           Nurse visit         Bhupinder Aspen DO

 

                    2011            Office visit        Bhupinder Aspen DO

 

                    2011           Office visit        Bhupinder Aspen DO

 

                    5/10/2011           Office visit        Bhupinder Aspen DO

 

                    2011           Office visit        Bhupinder Aspen DO

 

                    4/15/2011           Office visit        Devin Angel DO

 

                    2011           Office visit        Devin Angel DO

 

                    10/15/2010          Office visit        Devin Angel DO

 

                    2010           Office visit        Devin Angel DO

 

                    2010            Office visit        Devin Angel DO

 

                    2010           Office visit        Devin Angel DO

 

                    2010            Office visit        Devin Angel DO

 

                    3/8/2010            Office visit        Devin Masterson MD

 

                    2010            Surgery             Devin Masterson MD

 

                    2010            Office visit        Devin Angel DO

 

                    2010           Surgery             Devin Masterson MD

 

                    2010           Hospital            Devin Masterson MD

 

                    2010           [facility]            Devin Masterson MD

 

                    10/22/2009          Office visit        Devin Angel DO

## 2019-06-26 NOTE — XMS REPORT
MU2 Ambulatory Summary

                             Created on: 2015



Pauline Gan

External Reference #: [id number]

: 1950

Sex: Female



Demographics







                          Address                   [address]

[place]  [address]

 

                          Home Phone                [phone]

 

                          Preferred Language        English

 

                          Marital Status            Legally 

 

                          Mormon Affiliation     Unknown

 

                          Race                      White

 

                          Ethnic Group              Not  or 





Author







                          Author                    Bhupinder Louise

 

                          [facility]

 

                          Address                   [address]

[place]  [id number]



 

                          Phone                     [phone]







Care Team Providers







                    Care Team Member Name    Role                Phone

 

                    Bhupinder Louise    PCP                 Unavailable







Allergies and Adverse Reactions







                    Name                Reaction            Notes

 

                    NO KNOWN DRUG ALLERGIES                         







Plan of Treatment







             Planned Activity    Comments     Planned Date    Planned Time    Plan/Goal

 

             COMPLETE CBC W/AUTO DIFF WBC                 2013     12:00 AM      

 

             ASSAY OF LITHIUM                 2013     12:00 AM      

 

             METABOLIC PANEL TOTAL CA                 2013     12:00 AM      

 

             VITAMIN B-12                 2013     12:00 AM      

 

             ASSAY OF FOLIC ACID SERUM                 2013     12:00 AM      







Medications







                                        Active 

 

             Name         Start Date    Estimated Completion Date    SIG          Comments

 

                          syringe with needle (disp) Misc.(Non-Drug; Combo Route) Syringe 1 mL 25 X 1"    2011

                                        use for injection once a month     

 

                hydroxyzine HCl oral tablet 50 mg    10/16/2014                      take 1 tablet (50 mg) by oral 

route at bedtime                         

 

                Latuda oral tablet 20 mg                                    take 1 tablet (20 mg) by oral route once daily with

 food (at least 350 calories)            

 

             pravastatin oral tablet 40 mg    3/30/2015                 TAKE 1 TABLET BY MOUTH DAILY     

 

                lithium carbonate Oral capsule 300 mg    2015       take 1 capsule (300

 mg) by oral route 2 for 30 days         









                                         

 

             Name         Start Date    Expiration Date    SIG          Comments

 

             Reglan 10mg    3/29/2010    2010    one ac and hs     

 

                Keflex Oral Capsule 500 mg    2010       10/1/2010       take 1 capsule (500 mg) by oral

 route every 6 hours for 10 days         

 

                Bactrim DS Oral Tablet 800-160 mg    2011       take 1 tablet by oral route

 every 12 hours for 7 days               

 

                triamcinolone acetonide Topical Cream 0.1 %    2011      apply a thin

 layer to the affected area(s) by topical route 2 times per day     

 

                sertraline Oral tablet 100 mg    4/10/2012       5/10/2012       take 1.5 tablets by oral route

 daily for 30 days                       

 

                ergocalciferol (vitamin D2) Oral capsule 50,000 unit    4/15/2013       2013       TAKE

 ONE CAPSULE BY MOUTH ONCE A WEEK        

 

                CYANOCOBALAM 1000MCGINJ 1000 milliliter    2013       INJECT 1ML INTRAMUSCULAR

 ONCE A MONTH                            

 

                pravastatin Oral tablet 40 mg    3/25/2014       3/20/2015       TAKE ONE TABLET BY MOUTH EVERY

 DAY                                     

 

                          Zostavax (PF) subcutaneous suspension for reconstitution 19,400 unit/0.65 mL    3/23/2015

                    3/24/2015           inject 0.65 milliliter by subcutaneous route once     









                                        Discontinued 

 

             Name         Start Date    Discontinued Date    SIG          Comments

 

                Tylenol Oral Tablet 325 mg                    2013        take 1 - 2 tablets (325 -650 mg) by oral

 route every 4-6 hours as needed         

 

                Calcium 600 + D(3) Oral Tablet 600-400 mg-unit                    2011       take 1 tablet by oral

 route 2 times a day                    no longer taking

 

                Vitamin B-12 Oral Tablet Sustained Release 1,000 mcg    2010       take 

1 tablet by oral route daily            no longer taking

 

                Antifungal (Clotrimazole) Topical Cream 1 %    2010       apply to the 

affected and surrounding areas of skin by topical route 2 times per day morning 
and evening                              

 

                sertraline Oral Tablet 100 mg    5/10/2011       2011       take 2 tablets (200 mg) by 

oral route once daily                   discontinued by Dr. Serrano

 

                mirtazapine Oral Tablet 15 mg                    2011        take 1 tablet (15 mg) by oral route 

once daily before bedtime               Dr. Serrano

 

                mirtazapine Oral Tablet 15 mg                    2011        take 1 tablet (15 mg) by oral route 

once daily before bedtime               dc'd by Dr. Serrano

 

                Pristiq Oral Tablet Extended Release 24 hr 50 mg                    2013        take 1 tablet (50

 mg) by oral route once daily           Dr. Zach Sarahstiq Oral Tablet Extended Release 24 hr 50 mg                    2013        take 1 tablet (50

 mg) by oral route once daily           dose updated

 

                Vitamin B-12 Injection Solution 1,000 mcg/mL    2011        inject 1 milliliter

 (1,000 mcg) by intramuscular route once a month    on list already

 

                clotrimazole Topical Cream 1 %    2011        apply to the affected and surrounding

 areas of skin by topical route 2 times per day in the morning and evening     

 

                Vitamin D Oral Capsule 50,000 unit    2011        take 1 capsule (50,000 

unit) by oral route once weekly         generic on list

 

                Pravachol Oral Tablet 40 mg    2012        take 1 tablet (40 mg) by oral 

route once daily for 90 days            generic on list

 

                lithium carbonate Oral Capsule 300 mg    2012        take 1 capsule by oral

 route daily                            dose updated

 

                Pristiq Oral tablet extended release 24 hr 100 mg                    4/10/2012       take 1 and 1/2 

tablet (150 mg) by oral route once daily    [facility] provider

 

                Pristiq Oral tablet extended release 24 hr 100 mg                    4/10/2012       take 1 and 1/2 

tablet (150 mg) by oral route once daily    [facility]







Problem List







                    Description         Status              Onset

 

                    Artificial opening status; colostomy    Active               

 

                    Bipolar disorder, unspecified    Active               

 

                    Hyperlipidemia      Active               

 

                    Peritoneal Neoplasm, Malignant    Active               

 

                    Anemia, Pernicious    Active               

 

                    Arthritis unspecified    Active               

 

                    B12 deficiency      Active               







Vital Signs







      Date    Time    BP-Sys(mm[Hg]    BP-Lynn(mm[Hg])    HR(bpm)    RR(rpm)    Temp    WT    HT    HC    BMI

                    BSA                 BMI Percentile      O2 Sat(%)

 

       3/23/2015    2:55:00 PM    130 mmHg    76 mmHg    68 bpm    18 rpm    97 F    140 lbs    69 in    

                20.67 kg/m2     1.76 m2                         98 %

 

        10/16/2014    11:11:00 AM    120 mmHg    66 mmHg    77 bpm    20 rpm    98 F    130 lbs    69 in

                          19.1974 kg/m    1.6943 m                 100 %

 

        2014    3:21:00 PM    130 mmHg    66 mmHg    63 bpm    18 rpm    97.2 F    160 lbs    69 in

                          23.63 kg/m2    1.88 m2                   99 %

 

        2013    10:35:00 AM    132 mmHg    70 mmHg    66 bpm    20 rpm    98.1 F    157 lbs    69 in

                          23.1846 kg/m    1.862 m                  

 

        2013    1:29:00 PM    132 mmHg    70 mmHg    76 bpm    18 rpm    98.2 F    166 lbs    69 in 

                          24.51 kg/m2    1.91 m2                    

 

       2013    2:46:00 PM    128 mmHg    70 mmHg    76 bpm    16 rpm    98 F    160 lbs    69 in     

                23.6276 kg/m    1.8797 m                       

 

        2011    8:49:00 AM    128 mmHg    78 mmHg    70 bpm    18 rpm    97.9 F    164 lbs    69 in

                          24.22 kg/m2    1.90 m2                    

 

     2011    1:31:00 PM    132 mmHg    68 mmHg    84 bpm         97 F    167 lbs                        

                                         

 

        2011    9:09:00 AM    128 mmHg    70 mmHg    72 bpm    18 rpm    98.2 F    163 lbs    64 in 

                          27.98 kg/m2    1.83 m2                    

 

       2011    10:01:00 AM    132 mmHg    70 mmHg    72 bpm    18 rpm    98.2 F    154 lbs             

                                                                 

 

       2011    2:47:00 PM    128 mmHg    70 mmHg    72 bpm    18 rpm    97.8 F    156 lbs             

                                                                 

 

       5/10/2011    3:16:00 PM    144 mmHg    80 mmHg    72 bpm    18 rpm    98.2 F    158 lbs             

                                                                 

 

        2011    10:11:00 AM    132 mmHg    70 mmHg    70 bpm    18 rpm    98.2 F    168 lbs    69 in

                          24.81 kg/m2    1.93 m2                    

 

        4/15/2011    10:52:00 AM    110 mmHg    60 mmHg    75 bpm    16 rpm    97.5 F    172.375 lbs    

69 in                     25.4551 kg/m    1.951 m                 100 %

 

        2011    11:43:00 AM    120 mmHg    82 mmHg    75 bpm    16 rpm    97.2 F    178.5 lbs    69

 in                       26.36 kg/m2    1.99 m2                   100 %

 

        10/15/2010    1:32:00 PM    120 mmHg    70 mmHg    80 bpm    18 rpm    96.6 F    177 lbs    69 in

                          26.1381 kg/m    1.977 m                 100 %

 

        2010    3:50:00 PM    168 mmHg    100 mmHg    82 bpm    18 rpm    97.8 F    177.5 lbs    69

 in                       26.21 kg/m2    1.98 m2                   97 %

 

        2010    1:21:00 PM    140 mmHg    80 mmHg    59 bpm    16 rpm    97.6 F    173.25 lbs    69 

in                        25.5843 kg/m    1.956 m                 100 %

 

        2010    3:02:00 PM    140 mmHg    80 mmHg    61 bpm    16 rpm    97.6 F    173.125 lbs    69

 in                       25.57 kg/m2    1.96 m2                   99 %

 

        2010    1:23:00 PM    130 mmHg    80 mmHg    66 bpm    16 rpm    96.8 F    173 lbs    69 in 

                          25.5474 kg/m    1.9546 m                 100 %

 

        2010    12:58:00 PM    130 mmHg    88 mmHg    75 bpm    16 rpm    98.4 F    172.25 lbs    69

 in                       25.44 kg/m2    1.95 m2                   100 %







Social History







                    Name                Description         Comments

 

                    denies alcohol use                         

 

                    denies smoking                           

 

                    Denies illicit substance abuse                         

 

                    retired                                 direct care

 

                    Single                                   

 

                    Exercises regularly                         

 

                    Attended some college                         







History of Procedures







                    Date Ordered        Description         Order Status

 

                    2010 12:00 AM    COMPREHEN METABOLIC PANEL    Reviewed

 

                    2010 12:00 AM    COMPLETE CBC W/AUTO DIFF WBC    Reviewed

 

                    2010 12:00 AM    LIPID PANEL         Reviewed

 

                    2011 12:00 AM    MAMMOGRAM SCREENING    Reviewed

 

                    2011 12:00 AM    CYTOPATH C/V THIN LAYER    Reviewed

 

                    2011 12:00 AM    THER/PROPH/DIAG INJ SC/IM    Reviewed

 

                    10/20/2011 12:00 AM    THER/PROPH/DIAG INJ SC/IM    Reviewed

 

                    2011 12:00 AM    THER/PROPH/DIAG INJ SC/IM    Reviewed

 

                    2012 12:00 AM    THER/PROPH/DIAG INJ SC/IM    Reviewed

 

                    2012 12:00 AM    THER/PROPH/DIAG INJ SC/IM    Reviewed

 

                    5/3/2012 12:00 AM    THER/PROPH/DIAG INJ SC/IM    Reviewed

 

                    2012 12:00 AM    IMMUNOTHERAPY INJECTIONS    Reviewed

 

                    2012 12:00 AM    THER/PROPH/DIAG INJ SC/IM    Reviewed

 

                    2012 12:00 AM    THER/PROPH/DIAG INJ SC/IM    Reviewed

 

                    2012 12:00 AM    THER/PROPH/DIAG INJ SC/IM    Reviewed

 

                    10/16/2012 12:00 AM    THER/PROPH/DIAG INJ SC/IM    Reviewed

 

                    2010 12:00 AM    COMPREHEN METABOLIC PANEL    Reviewed

 

                    2010 12:00 AM    COMPLETE CBC W/AUTO DIFF WBC    Reviewed

 

                    2010 12:00 AM    LIPID PANEL         Reviewed

 

                    2013 12:00 AM    COMPLETE CBC W/AUTO DIFF WBC    Reviewed

 

                    2013 12:00 AM    ASSAY OF LITHIUM    Reviewed

 

                    2013 12:00 AM    METABOLIC PANEL TOTAL CA    Reviewed

 

                    4/3/2013 12:00 AM    THER/PROPH/DIAG INJ SC/IM    Reviewed

 

                    2013 12:00 AM    THER/PROPH/DIAG INJ SC/IM    Reviewed

 

                    2013 12:00 AM    THER/PROPH/DIAG INJ SC/IM    Reviewed

 

                    2013 12:00 AM    LIPID PANEL         Reviewed

 

                    2013 12:00 AM    VITAMIN D 25 HYDROXY    Reviewed

 

                    2013 12:00 AM    THER/PROPH/DIAG INJ SC/IM    Reviewed

 

                    3/6/2014 12:00 AM    THER/PROPH/DIAG INJ SC/IM    Reviewed

 

                    2014 12:00 AM    THER/PROPH/DIAG INJ SC/IM    Reviewed

 

                    2010 12:00 AM    SKIN FUNGI CULTURE    Reviewed

 

                    10/9/2010 12:00 AM    COMPREHEN METABOLIC PANEL    Reviewed

 

                    10/9/2010 12:00 AM    LIPID PANEL         Reviewed

 

                    2010 12:00 AM    THER/PROPH/DIAG INJ SC/IM    Reviewed

 

                    2010 12:00 AM    THER/PROPH/DIAG INJ SC/IM    Reviewed

 

                    10/15/2010 12:00 AM    FLU VACCINE 3 YRS & > IM    Reviewed

 

                    1/15/2011 12:00 AM    COMPLETE CBC W/AUTO DIFF WBC    Reviewed

 

                    1/15/2011 12:00 AM    COMPREHEN METABOLIC PANEL    Reviewed

 

                    1/15/2011 12:00 AM    LIPID PANEL         Reviewed

 

                    2014 12:00 AM    MAMMOGRAM SCREENING    Reviewed

 

                    7/10/2014 12:00 AM    THER/PROPH/DIAG INJ SC/IM    Reviewed

 

                    2011 12:00 AM    COMPLETE CBC W/AUTO DIFF WBC    Reviewed

 

                    2011 12:00 AM    COMPREHEN METABOLIC PANEL    Reviewed

 

                    2011 12:00 AM    LIPID PANEL         Reviewed

 

                    10/19/2014 12:00 AM    MAMMOGRAM SCREENING    Reviewed

 

                    10/16/2014 12:00 AM    COMPLETE CBC W/AUTO DIFF WBC    Reviewed

 

                    10/16/2014 12:00 AM    COMPREHEN METABOLIC PANEL    Reviewed

 

                    10/16/2014 12:00 AM    IMMUNOASSAY TUMOR     Reviewed

 

                    10/16/2014 12:00 AM    LIPID PANEL         Reviewed

 

                    10/16/2014 12:00 AM    ASSAY OF LITHIUM    Reviewed

 

                    10/16/2014 12:00 AM    MAMMOGRAM SCREENING    Reviewed

 

                    2011 12:00 AM    ASSAY OF PARATHORMONE    Reviewed

 

                    2011 12:00 AM    VITAMIN D 25 HYDROXY    Reviewed

 

                    2011 12:00 AM    ASSAY OF LITHIUM    Reviewed

 

                    2011 12:00 AM    METABOLIC PANEL TOTAL CA    Reviewed

 

                    2011 12:00 AM    CT HEAD/BRAIN W/O & W/DYE    Reviewed

 

                    3/23/2015 12:00 AM    PNEUMOCOCCAL VACC 13 GLENDY IM    Reviewed

 

                    2011 12:00 AM    ASSAY OF LITHIUM    Reviewed

 

                    2015 12:00 AM    THER/PROPH/DIAG INJ SC/IM    Reviewed

 

                    2011 12:00 AM    VIT D 1 25-DIHYDROXY    Reviewed

 

                    2011 12:00 AM    VITAMIN B-12        Reviewed

 

                    2011 12:00 AM    THER/PROPH/DIAG INJ SC/IM    Reviewed







Results Summary







                          Data and Description      Results

 

                          2004 12:00 AM        Colonoscopy-Women and Men over 50 Normal 

 

                          2008 12:00 AM         Pap Smear Declined 

 

                          10/7/2009 12:00 AM        Cholest Cry Stone Ql .0 %LDLc SerPl-mCnc 123.0 mg/dLHDLc

 SerPl-mCnc 34.0 mg/dLTrigl SerPl-mCnc 190.0 mg/dLGlucose SerPl-mCnc 78.0 mg/dL

 

                          2009 12:00 AM        Mammogram -Women over 40 Normal HIV1+2 Ab Ser Ql no risk 

 

                          2010 8:47 AM         Dexa Bone Scan Refused Aspirin reccommended Contraindication 



 

                          2010 8:48 AM         Depression Done 

 

                          2010 12:00 AM         Foot Exam-Diabetic Done 

 

                          2010 12:00 AM         Cholest Cry Stone Ql .0 %LDLc SerPl-mCnc 126.0 mg/dLGlucose

 SerPl-mCnc 102.0 mg/dL

 

                          2010 8:45 AM          TRIGLYCERIDES 122.0 mg/dLCHOLESTEROL 186.0 mg/dLHDL 36.0 mg/dLLDL

 (CALC) 126.0 mg/dLGLUCOSE 102.0 mg/dLSODIUM 143.0 mmol/LPOTASSIUM 3.70 
mmol/LCHLORIDE 111.0 mmol/LCO2 23.0 mmol/LBUN 10.0 mg/dLCREATININE 0.80 
mg/dLSGOT/AST 12.0 IU/LSGPT/ALT 11.0 IU/LALK PHOS 65.0 IU/LTOTAL PROTEIN 7.20 
g/dLALBUMIN 3.90 g/dLTOTAL BILI 0.50 mg/dLCALCIUM 10.20 mg/dLeGFR >60 
mL/min/1.73 m2WBC 5.7 RBC 3.26 HGB 10.60 g/dLHCT 31.70 %MCV 97.0 fLMCH 32.50 
pgMCHC 33.40 g/dLRDW CV 13.30 %MPV 9.70 fLPLT 287 %NEUT 62.90 %%LYMP 21.80 
%%MONO 9.90 %%EOS 5.0 %%BASO 0.40 %#NEUT 3.56 #LYMP 1.23 #MONO 0.56 #EOS 0.28 
#BASO 0.02 

 

                          2010 12:00 AM        Glucose SerPl-mCnc 96.0 mg/dLCholest Cry Stone Ql .0 %LDLc

 SerPl-mCnc 146.0 mg/dL

 

                          2010 8:26 AM         TRIGLYCERIDES 106.0 mg/dLCHOLESTEROL 199.0 mg/dLHDL 32.0 mg/dLLDL

 (CALC) 146.0 mg/dLGLUCOSE 96.0 mg/dLSODIUM 143.0 mmol/LPOTASSIUM 4.0 
mmol/LCHLORIDE 113.0 mmol/LCO2 24.0 mmol/LBUN 13.0 mg/dLCREATININE 1.0 
mg/dLSGOT/AST 11.0 IU/LSGPT/ALT 6.0 IU/LALK PHOS 56.0 IU/LTOTAL PROTEIN 6.60 
g/dLALBUMIN 3.80 g/dLTOTAL BILI 0.50 mg/dLCALCIUM 9.30 mg/dLeGFR 57 

 

                          10/6/2010 12:00 AM        Cholest Cry Stone Ql .0 %LDLc SerPl-mCnc 111.0 mg/dLGlucose

 SerPl-mCnc 81.0 mg/dL

 

                          10/6/2010 2:45 PM         TRIGLYCERIDES 123.0 mg/dLCHOLESTEROL 178.0 mg/dLHDL 42.0 mg/dLLDL

 (CALC) 111.0 mg/dLGLUCOSE 81.0 mg/dLSODIUM 139.0 mmol/LPOTASSIUM 4.10 
mmol/LCHLORIDE 106.0 mmol/LCO2 24.0 mmol/LBUN 13.0 mg/dLCREATININE 0.90 
mg/dLSGOT/AST 13.0 IU/LSGPT/ALT 11.0 IU/LALK PHOS 61.0 IU/LTOTAL PROTEIN 7.10 
g/dLALBUMIN 3.90 g/dLTOTAL BILI 0.30 mg/dLCALCIUM 9.30 mg/dLeGFR >60 mL/min/1.73
 m2WBC 6.9 RBC 3.59 HGB 11.50 g/dLHCT 35.30 %MCV 98.0 fLMCH 32.0 pgMCHC 32.60 
g/dLRDW CV 12.90 %MPV 9.90 fLPLT 311 %NEUT 64.90 %%LYMP 22.50 %%MONO 7.20 %%EOS 
5.10 %%BASO 0.30 %#NEUT 4.45 #LYMP 1.54 #MONO 0.49 #EOS 0.35 #BASO 0.02 

 

                          2011 12:00 AM         Mammogram -Women over 40 Ordered 

 

                          2011 10:25 AM        TRIGLYCERIDES 111.0 mg/dLCHOLESTEROL 195.0 mg/dLHDL 43.0 mg/dLLDL

 (CALC) 130.0 mg/dLWBC 5.3 RBC 3.76 HGB 12.0 g/dLHCT 37.80 %.0 fLMCH 
31.90 pgMCHC 31.70 g/dLRDW CV 13.0 %MPV 9.70 fLPLT 259 %NEUT 69.0 %%LYMP 17.60 
%%MONO 8.30 %%EOS 4.70 %%BASO 0.40 %#NEUT 3.63 #LYMP 0.93 #MONO 0.44 #EOS 0.25 
#BASO 0.02 GLUCOSE 102.0 mg/dLSODIUM 146.0 mmol/LPOTASSIUM 4.20 mmol/LCHLORIDE 
113.0 mmol/LCO2 23.0 mmol/LBUN 15.0 mg/dLCREATININE 1.0 mg/dLSGOT/AST 12.0 
IU/LSGPT/ALT 17.0 IU/LALK PHOS 60.0 IU/LTOTAL PROTEIN 6.90 g/dLALBUMIN 4.20 
g/dLTOTAL BILI 0.40 mg/dLCALCIUM 9.70 mg/dLeGFR 57 

 

                          2011 11:49 AM        Cholest Cry Stone Ql .0 %LDLc SerPl-mCnc 130.0 mg/dLHDLc

 SerPl-mCnc 43.0 mg/dLTrigl SerPl-mCnc 111.0 mg/dLGlucose SerPl-mCnc 102.0 mg/dL

 

                          2011 11:52 AM        Pap Smear Declined 

 

                          2011 11:28 AM        Lithium 2.080 mmol/LGLUCOSE 102.0 mg/dLSODIUM 135.0 mmol/LPOTASSIUM

 3.90 mmol/LCHLORIDE 106.0 mmol/LCO2 21.0 mmol/LBUN 12.0 mg/dLCREATININE 1.30 
mg/dLCALCIUM 10.70 mg/dLeGFR 42 

 

                          2011 8:58 AM          Lithium 0.690 mmol/L

 

                          2011 2:38 PM         VITAMIN B12 3483.0 pg/mL

 

                          2013 3:35 PM          WBC 5.1 RBC 3.73 HGB 11.70 g/dLHCT 36.40 %MCV 98.0 fLMCH 31.40

 pgMCHC 32.10 g/dLRDW CV 13.10 %MPV 9.80 fLPLT 224 %NEUT 66.80 %%LYMP 19.10 
%%MONO 9.0 %%EOS 4.90 %%BASO 0.20 %#NEUT 3.42 #LYMP 0.98 #MONO 0.46 #EOS 0.25 
#BASO 0.01 GLUCOSE 88.0 mg/dLSODIUM 141.0 mmol/LPOTASSIUM 4.10 mmol/LCHLORIDE 
110.0 mmol/LCO2 22.0 mmol/LBUN 22.0 mg/dLCREATININE 1.10 mg/dLCALCIUM 9.80 
mg/dLeGFR 50 Lithium 0.760 mmol/L

 

                          2013 11:02 AM        TRIGLYCERIDES 106.0 mg/dLCHOLESTEROL 181.0 mg/dLHDL 46.0 mg/dLLDL

 (CALC) 114.0 mg/dLVITAMIN D 41.10 ng/mL

 

                          10/17/2014 10:10 AM       WBC 5.0 RBC 3.66 HGB 11.60 g/dLHCT 36.80 %.0 fLMCH 31.70

 pgMCHC 31.50 g/dLRDW CV 13.50 %MPV 10.10 fLPLT 209 %NEUT 69.20 %%LYMP 21.0 
%%MONO 6.40 %%EOS 3.20 %%BASO 0.20 %#NEUT 3.46 #LYMP 1.05 #MONO 0.32 #EOS 0.16 
#BASO 0.01 GLUCOSE 100.0 mg/dLSODIUM 148.0 mmol/LPOTASSIUM 3.90 mmol/LCHLORIDE 
114.0 mmol/LCO2 26.0 mmol/LBUN 12.0 mg/dLCREATININE 1.20 mg/dLSGOT/AST 9.0 
IU/LSGPT/ALT <6 IU/LALK PHOS 82.0 IU/LTOTAL PROTEIN 6.90 g/dLALBUMIN 4.0 
g/dLTOTAL BILI 0.40 mg/dLCALCIUM 10.50 mg/dLeGFR 45 TRIGLYCERIDES 96.0 
mg/dLCHOLESTEROL 155.0 mg/dLHDL 38.0 mg/dLLDL (CALC) 98.0 mg/dLLithium 0.850 
mmol/LCancer Antigen (CA) 125 8.30 U/mL







History Of Immunizations







       Name    Date Admin    Mfg Name    Mfg Code    Trade Name    Lot#    Route    Inj    Vis Given    Vis

 Pub                                    CVX

 

        Influenza    2008    Not Entered    NE      Not Entered            Not Entered    Not Entered

                    1            999

 

          Pneumococcal    2008    [employer]    MSD       Pneumovax 23              Intramuscular

                Not Entered     1        999

 

           Influenza    10/15/2010    [employer]    NOV        Fluvirin > 12 Years    

[id number]     Intramuscular    Left Deltoid    10/15/2010    2009    999

 

          Pneumococcal    3/23/2015    [facility]    [facility]       Prevnar 13    [id number]    Intramuscular

                Right Gluteous Medius    3/23/2015       2013       109







History of Past Illness







                    Name                Date of Onset       Comments

 

                    Peritoneal Neoplasm, Malignant                         

 

                    Hyperlipidemia                           

 

                    Bipolar disorder, unspecified                         

 

                    Artificial opening status; colostomy                         

 

                    B12 deficiency                           

 

                    Anemia, Pernicious                         

 

                    Arthritis unspecified                         

 

                    cervical cancer                          

 

                    Artificial opening status; colostomy    2010  1:10PM     

 

                    Bipolar disorder, unspecified    2010  1:10PM     

 

                    Hyperlipidemia      2010  1:10PM     

 

                    Anemia, Pernicious    2010  1:10PM     

 

                    Postoperative Follow-Up    2010  1:55PM     

 

                    Postoperative Follow-Up    Mar  8 2010 10:57AM     

 

                    Artificial opening status; colostomy    Mar  8 2010  1:19PM     

 

                    Peritoneal Neoplasm, Malignant    Mar  8 2010  1:19PM     

 

                    Artificial opening status; colostomy    2010  1:40PM     

 

                    Hyperlipidemia      2010  1:40PM     

 

                    Anemia, Pernicious    2010  1:40PM     

 

                    Peritoneal Neoplasm, Malignant    2010  1:40PM     

 

                    Arthritis unspecified    2010  1:40PM     

 

                    Anemia of Chronic Illness    2010  1:40PM     

 

                    Tinea corporis      2010  3:17PM     

 

                    Bipolar disorder, unspecified    2010  1:33PM     

 

                    Hyperlipidemia      2010  1:33PM     

 

                    Anemia, Pernicious    2010  1:33PM     

 

                    Peritoneal Neoplasm, Malignant    2010  1:33PM     

 

                    B12 deficiency      2010  1:33PM     

 

                    Ethmoidal Sinusitis, Acute    Sep 21 2010  3:53PM     

 

                    Wheezing            Sep 21 2010  3:53PM     

 

                    Flu                 Oct 15 2010  1:40PM     

 

                    Bipolar disorder, unspecified    Oct 15 2010  1:42PM     

 

                    Hyperlipidemia      Oct 15 2010  1:42PM     

 

                    Anemia, Pernicious    Oct 15 2010  1:42PM     

 

                    Peritoneal Neoplasm, Malignant    Oct 15 2010  1:42PM     

 

                    Bipolar disorder, unspecified    2011 12:01PM     

 

                    Hyperlipidemia      2011 12:01PM     

 

                    Anemia, Pernicious    2011 12:01PM     

 

                    Peritoneal Neoplasm, Malignant    2011 12:01PM     

 

                    Bipolar disorder, unspecified    Apr 15 2011 10:55AM     

 

                    Major Depression    2011 10:11AM     

 

                    Bipolar Disorder    2011 10:11AM     

 

                    Cancer              May 10 2011  4:16PM     

 

                    Major Depression    May 10 2011  3:16PM     

 

                    Bipolar Disorder    May 10 2011  3:16PM     

 

                    Hypercalcemia       May 23 2011  2:47PM     

 

                    Bipolar disorder, unspecified    May 23 2011  2:47PM     

 

                    Colon Cancer, Personal History    May 23 2011  2:47PM     

 

                    Bipolar Disorder    May 31 2011  4:39PM     

 

                    Depressive Disorder    2011 10:01AM     

 

                    Vitamin B12 deficiency    2011 10:01AM     

 

                    Vitamin D Deficiency    2011  5:07PM     

 

                    Anemia, Vitamin B12 Deficiency    2011  5:07PM     

 

                    B12 deficiency      2011  3:56PM     

 

                    Routine gynecological examination    Aug  4 2011  9:08AM     

 

                    Screening Examination for Breast Cancer    Aug  4 2011  9:08AM     

 

                    Tinea Corporis      Aug  4 2011  9:08AM     

 

                    Depressive Disorder    Sep 23 2011  8:47AM     

 

                    Contact Dermatitis    Sep 23 2011  8:47AM     

 

                    Anemia, Pernicious    Sep 23 2011  8:47AM     

 

                    B12 deficiency      Sep 23 2011  8:47AM     

 

                    B12 deficiency      Sep 27 2011  2:58PM     

 

                    B12 deficiency      Oct 20 2011  2:34PM     

 

                    Flu                 Dec  9 2011  3:16PM     

 

                    B12 deficiency      Dec  9 2011  3:17PM     

 

                    B12 deficiency      2012  4:52PM     

 

                    B12 deficiency      2012 11:10AM     

 

                    B12 deficiency      2012  3:37PM     

 

                    B12 deficiency      May  3 2012  4:10PM     

 

                    B12 deficiency      2012  2:54PM     

 

                    B12 deficiency      2012 11:23AM     

 

                    B12 deficiency      Aug  9 2012  2:08PM     

 

                    B12 deficiency      Sep  6 2012  4:36PM     

 

                    B12 deficiency      Oct 16 2012 10:23AM     

 

                    Flu                 Feb  2013  3:11PM     

 

                    Bipolar disorder, unspecified    Feb  2013  2:48PM     

 

                    Anemia, Pernicious    Feb  2013  2:48PM     

 

                    B12 deficiency      Feb  2013  2:48PM     

 

                    Extrapyramidal abnormal movement disorder    Feb  2013  2:48PM     

 

                    B12 deficiency      Apr  3 2013 12:03PM     

 

                    Bipolar disorder, unspecified    May  7 2013  1:31PM     

 

                    Anemia, Pernicious    May  7 2013  1:31PM     

 

                    B12 deficiency      May  7 2013  1:31PM     

 

                    Extrapyramidal abnormal movement disorder    May  7 2013  1:31PM     

 

                    B12 deficiency      2013  3:42PM     

 

                    B12 deficiency      2013  1:31PM     

 

                    Hyperlipidemia      Aug  7 2013 10:37AM     

 

                    Vitamin D Deficiency    Aug  7 2013 10:37AM     

 

                    Bipolar disorder, unspecified    Aug  7 2013 10:37AM     

 

                    Anemia, Pernicious    Aug  7 2013 10:37AM     

 

                    B12 deficiency      Aug  7 2013 10:37AM     

 

                    B12 deficiency      Sep 25 2013 11:15AM     

 

                    B12 deficiency      Dec 11 2013  3:16PM     

 

                    B12 deficiency      Mar  6 2014  1:48PM     

 

                    B12 deficiency      May 21 2014  3:17PM     

 

                    Screening Examination for Breast Cancer    2014  3:23PM     

 

                    Periumbilical abdominal pain    2014  3:23PM     

 

                    B12 deficiency      Jul 10 2014  2:52PM     

 

                    Anemia, Vitamin B12 Deficiency    Aug 13 2014  4:50PM     

 

                    Bipolar disorder    Oct 16 2014 11:13AM     

 

                    Hyperlipidemia      Oct 16 2014 11:13AM     

 

                    Anemia, Pernicious    Oct 16 2014 11:13AM     

 

                    Peritoneal Neoplasm, Malignant    Oct 16 2014 11:13AM     

 

                    Screening breast examination    Oct 16 2014 11:13AM     

 

                    Weight loss         Oct 16 2014 11:13AM     

 

                    Anemia, Pernicious    Mar 23 2015  2:57PM     

 

                    B12 deficiency      Mar 23 2015  2:57PM     

 

                    Need for Prevnar vaccine    Mar 23 2015  2:57PM     

 

                    Bipolar disorder    Mar 23 2015  2:57PM     

 

                    Hyperlipidemia      Mar 23 2015  2:57PM     

 

                    Anemia, Pernicious    Mar 23 2015  2:57PM     

 

                    Peritoneal Neoplasm, Malignant    Mar 23 2015  2:57PM     

 

                    B12 deficiency      May  4 2015  4:48PM     







Payers







           Insurance Name    Company Name    Plan Name    Plan Number    Policy Number    Policy Group

 Number                                 Start Date

 

                    Medicare Part A    Medicare Part A              [id number]              N/A

 

                    [facility]              [id number]              N/A

 

                    Medicare Part B    [employer] [place]              [id number]              2006

 

                          [employer]    [employer]                 50 percent

                                                    2009







History of Encounters







                    Visit Date          Visit Type          Provider

 

                    2015            Nurse visit         Bhupinder Aspen DO

 

                    3/23/2015           Office visit        Bhupinder Aspen DO

 

                    10/16/2014          Office visit        Bhupinder Aspen DO

 

                    2014           Nurse visit         Radha Ontiveros APRN

 

                    7/10/2014           Nurse visit         Bhupinder Aspen DO

 

                    2014           Office visit        Bhupinder Aspen DO

 

                    2014           Nurse visit         Bhupinder Aspen DO

 

                    3/6/2014            Nurse visit         Bhupinder Aspen DO

 

                    2014            [facility]            EARNEST Lopez MD

 

                    2013          Nurse visit         Bhupinder Aspen DO

 

                    2013           Nurse visit         Bhupinder Aspen DO

 

                    2013            Office visit        Bhupinder Aspen DO

 

                    2013            Nurse visit         Bhupinder Aspen DO

 

                    2013            Nurse visit         Bhupinder Aspen DO

 

                    2013            Office visit        Bhupinder Aspen DO

 

                    4/3/2013            Nurse visit         Bhupinder Aspen DO

 

                    2013            Office visit        Bhupinder Aspen DO

 

                    10/16/2012          Nurse visit         Bhupinder Aspen DO

 

                    2012            Voided              Bhupinder Aspen DO

 

                    2012            Nurse visit         Bhupinder Aspen DO

 

                    2012            Nurse visit         Bhupinder Aspen DO

 

                    2012            Nurse visit         Bhupinder Aspen DO

 

                    2012           Nurse visit         Bhupinder Aspen DO

 

                    5/3/2012            Nurse visit         Bhupinder Aspen DO

 

                    2012           Nurse visit         Bhupinder Aspen DO

 

                    2012           Nurse visit         Bhupinder Aspen DO

 

                    2012           Nurse visit         Bhupinder Aspen DO

 

                    2011           Nurse visit         Bhupinder Aspen DO

 

                    10/20/2011          Nurse visit         Bhupinder Aspen DO

 

                    2011           Office visit        Bhupinder Aspen DO

 

                    2011           Nurse visit         Radha GREEN

 

                    2011            Office visit        Bhupinder Louise DO

 

                    2011           Nurse visit         Bhupinder Louise DO

 

                    2011            Office visit        Bhupinder Louise DO

 

                    2011           Office visit        Bhupinder Louise DO

 

                    5/10/2011           Office visit        Bhupinder Louise DO

 

                    2011           Office visit        Bhupinder Louise DO

 

                    4/15/2011           Office visit        Devin Angel DO

 

                    2011           Office visit        Devin Angel DO

 

                    10/15/2010          Office visit        Devin Angel DO

 

                    2010           Office visit        Devin Angel DO

 

                    2010            Office visit        Devin Angel DO

 

                    2010           Office visit        Devin Angel DO

 

                    2010            Office visit        Devin Angel DO

 

                    3/8/2010            Office visit        Devin Masterson MD

 

                    2010            Office visit        Devin Angel DO

 

                    2010            Surgery             Devin Masterson MD

 

                    2010           Surgery             Devin Masterson MD

 

                    2010           Hospital            Devin Masterson MD

 

                    2010           Hospital            Devin Masterson MD

 

                    10/22/2009          Office visit        Devin Angel DO

## 2019-06-26 NOTE — XMS REPORT
MU2 Ambulatory Summary

                             Created on: 2017



Pauline Gan

External Reference #: [id number]

: 1950

Sex: Female



Demographics







                          Address                   [address]

[place]  [address]

 

                          Home Phone                [phone]

 

                          Preferred Language        English

 

                          Marital Status            Legally 

 

                          Anabaptist Affiliation     Unknown

 

                          Race                      White

 

                          Ethnic Group              Not  or 





Author







                          Devin Aaron

 

                          [facility]

 

                          Address                   [address]

[place]  [id number]



 

                          Phone                     [phone]







Care Team Providers







                    Care Team Member Name    Role                Phone

 

                    Devin Masterson       PCP                 Unavailable

 

                    Bhupinder Louise    PreferredProvider    Unavailable







Allergies and Adverse Reactions







                    Name                Reaction            Notes

 

                    NO KNOWN DRUG ALLERGIES                         







Plan of Treatment







             Planned Activity    Comments     Planned Date    Planned Time    Plan/Goal

 

             Injection, Subcutaneous/IM                 2016    12:00 AM      

 

             Injection, Subcutaneous/IM                 2016    12:00 AM      

 

             Mammography; bilateral                 2016    12:00 AM      

 

             Injection, Subcutaneous/IM                 2016    12:00 AM      

 

             CBC with Auto                 2013     12:00 AM      

 

             Lithium                   2013     12:00 AM      

 

             Basic metabolic panel                 2013     12:00 AM      

 

             Vitamin B12 (cyanocobalamin)                 2013     12:00 AM      

 

             Folic acid, serum                 2013     12:00 AM      

 

             Injection,Subcutaneous/Intramuscul                 2013    12:00 AM      







Medications







                                        Active 

 

             Name         Start Date    Estimated Completion Date    SIG          Comments

 

                Latuda 20 mg oral tablet                                    take 1 tablet (20 mg) by oral route once daily with

 food (at least 350 calories)            

 

             pravastatin 40 mg oral tablet    3/30/2015                 TAKE 1 TABLET BY MOUTH DAILY     

 

                Namenda XR 28 mg oral capsule,sprinkle,ER 24hr    2015                       take 1 capsule (28

 mg) by oral route once daily            

 

                Namenda XR 28 mg oral capsule,sprinkle,ER 24hr    2016                       take 1 capsule (28

 mg) by oral route once daily            

 

                triamcinolone acetonide 0.1 % topical cream    2016                        apply a thin layer to 

the affected area(s) by topical route 2 times per day     

 

                potassium chloride 10 mEq oral tablet extended release    2016                       take 1 tablet

 (10 meq) by oral route once daily       

 

             pravastatin 40 mg oral tablet    2016                 TAKE 1 TABLET BY MOUTH DAILY     

 

                Vitamin B-12 1,000 mcg/mL injection solution    2016                       inject 1 milliliter 

(1,000 mcg) by intramuscular route once a month     

 

                potassium chloride 10 mEq oral tablet extended release    2016                      take 1 tablet

 (10 meq) by oral route once daily       

 

                Namenda XR 28 mg oral capsule,sprinkle,ER 24hr    2016                      TAKE 1 CAPSULE BY

 MOUTH EVERY DAY                         

 

                furosemide 40 mg oral tablet    2016                      take 1 tablet (40 mg) by oral route

 once daily                              

 

                Bactrim -160 mg oral tablet    2016        take 1 tablet by oral route

 every 12 hours for 7 days               

 

                metoclopramide HCl 10 mg oral tablet    2017       take 1 tablet by oral

 route 2 times a day for 50 days         









                                         

 

             Name         Start Date    Expiration Date    SIG          Comments

 

             Reglan 10mg    3/29/2010    2010    one ac and hs     

 

                Keflex 500 mg oral capsule    2010       10/1/2010       take 1 capsule (500 mg) by oral

 route every 6 hours for 10 days         

 

                Bactrim -160 mg oral tablet    2011       take 1 tablet by oral route

 every 12 hours for 7 days               

 

                triamcinolone acetonide 0.1 % topical cream    2011      apply a thin

 layer to the affected area(s) by topical route 2 times per day     

 

                sertraline 100 mg oral tablet    4/10/2012       5/10/2012       take 1.5 tablets by oral route

 daily for 30 days                       

 

                ergocalciferol (vitamin D2) 50,000 unit oral capsule    4/15/2013       2013       TAKE

 ONE CAPSULE BY MOUTH ONCE A WEEK        

 

                CYANOCOBALAM 1000MCGINJ 1000 milliliter    2013       INJECT 1ML INTRAMUSCULAR

 ONCE A MONTH                            

 

                pravastatin 40 mg oral tablet    3/25/2014       3/20/2015       TAKE ONE TABLET BY MOUTH EVERY

 DAY                                     

 

                          Zostavax (PF) 19,400 unit/0.65 mL subcutaneous suspension for reconstitution    3/23/2015

                    3/24/2015           inject 0.65 milliliter by subcutaneous route once     

 

                famciclovir 500 mg oral tablet    12/3/2015       12/10/2015      take 1 tablet (500 mg) by

 oral route every 8 hours for 7 days     

 

                furosemide 40 mg oral tablet    2016      take 1 tablet (40 mg) by oral

 route once daily                        

 

                Cipro 500 mg oral tablet    2016       take 1 tablet (500 mg) by oral route

 2 times per day for 5 days              









                                        Discontinued 

 

             Name         Start Date    Discontinued Date    SIG          Comments

 

                Tylenol 325 mg oral tablet                    2013        take 1 - 2 tablets (325 -650 mg) by oral

 route every 4-6 hours as needed         

 

                Calcium 600 + D(3) 600 mg(1,500mg) -400 unit oral tablet                    2011       take 1 tablet

 by oral route 2 times a day            no longer taking

 

                Vitamin B-12 1,000 mcg oral tablet extended release    2010       take 1

 tablet by oral route daily             no longer taking

 

                Antifungal (clotrimazole) 1 % topical cream    2010       apply to the 

affected and surrounding areas of skin by topical route 2 times per day morning 
and evening                              

 

                sertraline 100 mg oral tablet    5/10/2011       2011       take 2 tablets (200 mg) by 

oral route once daily                   discontinued by Dr. Serrano

 

                mirtazapine 15 mg oral tablet                    2011        take 1 tablet (15 mg) by oral route 

once daily before bedtime               Dr. Serraon

 

                mirtazapine 15 mg oral tablet                    2011        take 1 tablet (15 mg) by oral route 

once daily before bedtime               dc'd by Dr. Serrano

 

                Pristiq 50 mg oral tablet extended release 24 hr                    2013        take 1 tablet (50

 mg) by oral route once daily           Dr. Serrano

 

                Pristiq 50 mg oral tablet extended release 24 hr                    2013        take 1 tablet (50

 mg) by oral route once daily           dose updated

 

                Vitamin B-12 1,000 mcg/mL injection solution    2011        inject 1 milliliter

 (1,000 mcg) by intramuscular route once a month    on list already

 

                    syringe with needle 1 mL 25 gauge x 1" miscellaneous syringe    2011

                          use for injection once a month     

 

                clotrimazole 1 % topical cream    2011        apply to the affected and surrounding

 areas of skin by topical route 2 times per day in the morning and evening     

 

                Vitamin D2 50,000 unit oral capsule    2011        take 1 capsule (50,000

 unit) by oral route once weekly        generic on list

 

                Pravachol 40 mg oral tablet    2012        take 1 tablet (40 mg) by oral 

route once daily for 90 days            generic on list

 

                lithium carbonate 300 mg oral capsule    2012        take 1 capsule by oral

 route daily                            dose updated

 

                Pristiq 100 mg oral tablet extended release 24 hr                    4/10/2012       take 1 and 1/2 

tablet (150 mg) by oral route once daily    Mental Health provider

 

                Pristiq 100 mg oral tablet extended release 24 hr                    4/10/2012       take 1 and 1/2 

tablet (150 mg) by oral route once daily    Discontinued by Dr Efrain [facility] at [facility]

 

                hydroxyzine HCl 50 mg oral tablet    10/16/2014      2015       take 1 tablet (50 mg) 

by oral route at bedtime                 

 

                lithium carbonate 300 mg oral capsule    2015       take 1 capsule (300

 mg) by oral route 2 for 30 days         

 

                fluconazole 100 mg oral tablet    2015       12/3/2015       take 1 tablet (100 mg) by 

oral route once a week                   

 

                ketoconazole 2 % topical cream    2015       12/3/2015       apply to the affected area(s)

 by topical route 2 times per day        

 

                prednisone 10 mg oral tablet    12/3/2015       2016        take 2 tablets (20 mg) by oral

 route once daily for 4 days 1 tablet daily for 4 days 0.5 tablet daily for 4 
days                                     







Problem List







                    Description         Status              Onset

 

                    Artificial opening status; colostomy    Active               

 

                    Bipolar disorder, unspecified    Active               

 

                    Hyperlipidemia      Active               

 

                    Peritoneal Neoplasm, Malignant    Active               

 

                    Anemia, Pernicious    Active               

 

                    Arthritis unspecified    Active               

 

                    B12 deficiency      Active               







Vital Signs







      Date    Time    BP-Sys(mm[Hg]    BP-Lynn(mm[Hg])    HR(bpm)    RR(rpm)    Temp    WT    HT    HC    BMI

                    BSA                 BMI Percentile      O2 Sat(%)

 

       2017    1:51:00 PM    160 mmHg    90 mmHg    100 bpm    20 rpm    96.5 F    179 lbs             

                                                                98 %

 

       2016    3:11:00 PM    134 mmHg    76 mmHg    80 bpm    20 rpm    98 F    163 lbs    69 in     

                24.0706 kg/m    1.8972 m                      98 %

 

        2016    2:04:00 PM    142 mmHg    86 mmHg    68 bpm    16 rpm    98.5 F    166 lbs    63 in

                          29.41 kg/m2    1.83 m2                   100 %

 

        2016    11:27:00 AM    148 mmHg    78 mmHg    90 bpm    20 rpm    98.2 F    153 lbs    69 in

                          22.5939 kg/m    1.8381 m                 96 %

 

        12/3/2015    9:50:00 AM    132 mmHg    70 mmHg    62 bpm    16 rpm    97.9 F    145 lbs    69 in

                          21.41 kg/m2    1.79 m2                   100 %

 

        2015    8:52:00 AM    132 mmHg    68 mmHg    52 bpm    20 rpm    97.8 F    141 lbs    69 in

                          20.8218 kg/m    1.7645 m                 100 %

 

        2015    3:25:00 PM    120 mmHg    62 mmHg    72 bpm    16 rpm    98.1 F    136 lbs    69 in

                          20.08 kg/m2    1.73 m2                   98 %

 

       3/23/2015    2:55:00 PM    130 mmHg    76 mmHg    68 bpm    18 rpm    97 F    140 lbs    69 in    

                20.6742 kg/m    1.7583 m                      98 %

 

        10/16/2014    11:11:00 AM    120 mmHg    66 mmHg    77 bpm    20 rpm    98 F    130 lbs    69 in

                          19.20 kg/m2    1.69 m2                   100 %

 

        2014    3:21:00 PM    130 mmHg    66 mmHg    63 bpm    18 rpm    97.2 F    160 lbs    69 in

                          23.6276 kg/m    1.8797 m                 99 %

 

        2013    10:35:00 AM    132 mmHg    70 mmHg    66 bpm    20 rpm    98.1 F    157 lbs    69 in

                          23.18 kg/m2    1.86 m2                    

 

        2013    1:29:00 PM    132 mmHg    70 mmHg    76 bpm    18 rpm    98.2 F    166 lbs    69 in 

                          24.5137 kg/m    1.9146 m                  

 

       2013    2:46:00 PM    128 mmHg    70 mmHg    76 bpm    16 rpm    98 F    160 lbs    69 in     

                23.63 kg/m2     1.88 m2                          

 

        2011    8:49:00 AM    128 mmHg    78 mmHg    70 bpm    18 rpm    97.9 F    164 lbs    69 in

                          24.2183 kg/m    1.903 m                  

 

     2011    1:31:00 PM    132 mmHg    68 mmHg    84 bpm         97 F    167 lbs                        

                                         

 

        2011    9:09:00 AM    128 mmHg    70 mmHg    72 bpm    18 rpm    98.2 F    163 lbs    64 in 

                          27.9786 kg/m    1.8272 m                  

 

       2011    10:01:00 AM    132 mmHg    70 mmHg    72 bpm    18 rpm    98.2 F    154 lbs             

                                                                 

 

       2011    2:47:00 PM    128 mmHg    70 mmHg    72 bpm    18 rpm    97.8 F    156 lbs             

                                                                 

 

       5/10/2011    3:16:00 PM    144 mmHg    80 mmHg    72 bpm    18 rpm    98.2 F    158 lbs             

                                                                 

 

        2011    10:11:00 AM    132 mmHg    70 mmHg    70 bpm    18 rpm    98.2 F    168 lbs    69 in

                          24.809 kg/m    1.9261 m                  

 

        4/15/2011    10:52:00 AM    110 mmHg    60 mmHg    75 bpm    16 rpm    97.5 F    172.375 lbs    

69 in                     25.46 kg/m2    1.95 m2                   100 %

 

        2011    11:43:00 AM    120 mmHg    82 mmHg    75 bpm    16 rpm    97.2 F    178.5 lbs    69

 in                       26.3596 kg/m    1.9854 m                 100 %

 

        10/15/2010    1:32:00 PM    120 mmHg    70 mmHg    80 bpm    18 rpm    96.6 F    177 lbs    69 in

                          26.14 kg/m2    1.98 m2                   100 %

 

        2010    3:50:00 PM    168 mmHg    100 mmHg    82 bpm    18 rpm    97.8 F    177.5 lbs    69

 in                       26.2119 kg/m    1.9798 m                 97 %

 

        2010    1:21:00 PM    140 mmHg    80 mmHg    59 bpm    16 rpm    97.6 F    173.25 lbs    69 

in                        25.58 kg/m2    1.96 m2                   100 %

 

        2010    3:02:00 PM    140 mmHg    80 mmHg    61 bpm    16 rpm    97.6 F    173.125 lbs    69

 in                       25.5658 kg/m    1.9553 m                 99 %

 

        2010    1:23:00 PM    130 mmHg    80 mmHg    66 bpm    16 rpm    96.8 F    173 lbs    69 in 

                          25.55 kg/m2    1.95 m2                   100 %

 

        2010    12:58:00 PM    130 mmHg    88 mmHg    75 bpm    16 rpm    98.4 F    172.25 lbs    69

 in                       25.4366 kg/m    1.9503 m                 100 %







Social History







                    Name                Description         Comments

 

                    denies alcohol use                         

 

                    denies smoking                           

 

                    Denies illicit substance abuse                         

 

                    retired                                 direct care

 

                    Single                                   

 

                    Exercises regularly                         

 

                    Attended some college                         

 

                    Tobacco             Never smoker         







History of Procedures







                    Date Ordered        Description         Order Status

 

                    2010 12:00 AM    COMPREHEN METABOLIC PANEL    Reviewed

 

                    2010 12:00 AM    COMPLETE CBC W/AUTO DIFF WBC    Reviewed

 

                    2010 12:00 AM    LIPID PANEL         Reviewed

 

                          2015 12:00 AM        B12 Injection, Up to 1000 Mcg NDC#8550-1276-14 [facility] 

                                        Reviewed

 

                    2011 12:00 AM    MAMMOGRAM SCREENING    Reviewed

 

                    2011 12:00 AM    CYTOPATH C/V THIN LAYER    Reviewed

 

                    2011 12:00 AM    B12 Injection 1 cc NDC#[address]-97    Reviewed

 

                    2015 12:00 AM    THER/PROPH/DIAG INJ SC/IM    Reviewed

 

                    2015 12:00 AM    B12 Injection, Up to 1000 Mcg NDC#0930-3101-60    Reviewed

 

                    2011 12:00 AM    THER/PROPH/DIAG INJ SC/IM    Reviewed

 

                    2011 12:00 AM    B12 Injection([place]) Ndc#4524-9151-80-    Reviewed

 

                    2015 12:00 AM    THER/PROPH/DIAG INJ SC/IM    Reviewed

 

                    2015 12:00 AM    B12 Injection, Up to 1000 Mcg NDC#4769-0040-86    Reviewed

 

                    10/20/2011 12:00 AM    THER/PROPH/DIAG INJ SC/IM    Reviewed

 

                    10/20/2011 12:00 AM    B12 Injection([place]ella) Ndc#6801-6710-09-    Reviewed

 

                    2016 12:00 AM    THER/PROPH/DIAG INJ SC/IM    Reviewed

 

                    2016 12:00 AM    B12 Injection, Up to 1000 Mcg NDC#2810-8507-34    Reviewed

 

                    3/14/2016 12:00 AM    VITAMIN B-12        Reviewed

 

                    3/15/2016 12:00 AM    THER/PROPH/DIAG INJ SC/IM    Reviewed

 

                    3/15/2016 12:00 AM    B12 Injection, Up to 1000 Mcg NDC#3458-7279-62    Reviewed

 

                    2011 12:00 AM    ***Immunization administration, Medicare flu    Reviewed

 

                    2011 12:00 AM    Fluzone ** MEDICARE Only **    Reviewed

 

                    2011 12:00 AM    THER/PROPH/DIAG INJ SC/IM    Reviewed

 

                    2011 12:00 AM    B12 Injection ([facility]) Ndc#3855-4472-04    Reviewed

 

                    2016 12:00 AM    B12 Injection, Up to 1000 Mcg NDC#1699-2393-62 [facility]    

Reviewed

 

                    2016 12:00 AM    TTE W/DOPPLER COMPLETE    Reviewed

 

                    2016 12:00 AM    EXTREMITY STUDY     Returned

 

                          2016 12:00 AM        B12 Injection, Up to 1000 Mcg NDC#3876-3817-33 [facility] 

                                        Reviewed

 

                    2016 12:00 AM    THER/PROPH/DIAG INJ SC/IM    Reviewed

 

                    2012 12:00 AM    THER/PROPH/DIAG INJ SC/IM    Reviewed

 

                    2012 12:00 AM    B12 Injection ([employer]) Ndc#3507-9725-35    Reviewed

 

                    2016 12:00 AM    THER/PROPH/DIAG INJ SC/IM    Reviewed

 

                    2016 12:00 AM    B12 Injection, Up to 1000 Mcg NDC#0345-7995-15    Reviewed

 

                    2012 12:00 AM    THER/PROPH/DIAG INJ SC/IM    Reviewed

 

                    2012 12:00 AM    B12 Injection([place]) Ndc#7309-1209-14-    Reviewed

 

                    12/15/2016 12:00 AM    B12 Injection, Up to 1000 Mcg NDC#7451-4098-56    Reviewed

 

                    12/15/2016 12:00 AM    THER/PROPH/DIAG INJ SC/IM    Reviewed

 

                    2016 12:00 AM    URNLS DIP STICK/TABLET RGNT AUTO W/O MICROSCOPY    Returned

 

                    1/3/2017 12:00 AM    URNLS DIP STICK/TABLET RGNT AUTO W/O MICROSCOPY    Returned

 

                    5/3/2012 12:00 AM    THER/PROPH/DIAG INJ SC/IM    Reviewed

 

                    5/3/2012 12:00 AM    B12 Injection([place]) Ndc#0473-9234-58-    Reviewed

 

                    2012 12:00 AM    IMMUNOTHERAPY INJECTIONS    Reviewed

 

                    2012 12:00 AM    B12 Injection([place]) Ndc#0717-0430-23-    Reviewed

 

                    2012 12:00 AM    THER/PROPH/DIAG INJ SC/IM    Reviewed

 

                    2012 12:00 AM    B12 Injection, Up to 1000 Mcg NDC#5022-6828-17    Reviewed

 

                    2012 12:00 AM    THER/PROPH/DIAG INJ SC/IM    Reviewed

 

                    2012 12:00 AM    B12 Injection, Up to 1000 Mcg NDC#5770-3295-90    Reviewed

 

                    2012 12:00 AM    THER/PROPH/DIAG INJ SC/IM    Reviewed

 

                    2012 12:00 AM    B12 Injection, Up to 1000 Mcg NDC#5288-8311-46    Reviewed

 

                    10/16/2012 12:00 AM    THER/PROPH/DIAG INJ SC/IM    Reviewed

 

                    10/16/2012 12:00 AM    B12 Injection, Up to 1000 Mcg NDC#5172-0662-59    Reviewed

 

                    2010 12:00 AM    COMPREHEN METABOLIC PANEL    Reviewed

 

                    2010 12:00 AM    COMPLETE CBC W/AUTO DIFF WBC    Reviewed

 

                    2010 12:00 AM    LIPID PANEL         Reviewed

 

                    2013 12:00 AM    Flu Injection 3 Years And Above NDC# [address]-79  [facility]    Reviewed



 

                    2013 12:00 AM    COMPLETE CBC W/AUTO DIFF WBC    Reviewed

 

                    2013 12:00 AM    ASSAY OF LITHIUM    Reviewed

 

                    2013 12:00 AM    METABOLIC PANEL TOTAL CA    Reviewed

 

                    4/3/2013 12:00 AM    THER/PROPH/DIAG INJ SC/IM    Reviewed

 

                    4/3/2013 12:00 AM    B12 Injection, Up to 1000 Mcg NDC#0109-1922-88    Reviewed

 

                    2013 12:00 AM    THER/PROPH/DIAG INJ SC/IM    Reviewed

 

                    2013 12:00 AM    B12 Injection, Up to 1000 Mcg NDC#7711-4853-46    Reviewed

 

                    2013 12:00 AM    THER/PROPH/DIAG INJ SC/IM    Reviewed

 

                    2013 12:00 AM    B12 Injection, Up to 1000 Mcg NDC#7288-5290-18    Reviewed

 

                    2013 12:00 AM    LIPID PANEL         Reviewed

 

                    2013 12:00 AM    VITAMIN D 25 HYDROXY    Reviewed

 

                    2013 12:00 AM    THER/PROPH/DIAG INJ SC/IM    Reviewed

 

                    3/6/2014 12:00 AM    THER/PROPH/DIAG INJ SC/IM    Reviewed

 

                    2014 12:00 AM    THER/PROPH/DIAG INJ SC/IM    Reviewed

 

                    2014 12:00 AM    B12 Injection, Up to 1000 Mcg NDC#1013-7303-08    Reviewed

 

                    2010 12:00 AM    SKIN FUNGI CULTURE    Reviewed

 

                    10/9/2010 12:00 AM    COMPREHEN METABOLIC PANEL    Reviewed

 

                    10/9/2010 12:00 AM    LIPID PANEL         Reviewed

 

                    2010 12:00 AM    THER/PROPH/DIAG INJ SC/IM    Reviewed

 

                    2010 12:00 AM    B12 Injection Ndc#[address]-37 (Angel)    Reviewed

 

                    2010 12:00 AM    THER/PROPH/DIAG INJ SC/IM    Reviewed

 

                    2010 12:00 AM    Kenalog 40 Mg Im-Ndc#[address]-29 (Angel)    Reviewed

 

                    10/15/2010 12:00 AM    FLU VACCINE 3 YRS & > IM    Reviewed

 

                    10/15/2010 12:00 AM    Admin.Of M/C Cov.Vaccine-Flu Vacc.    Reviewed

 

                    1/15/2011 12:00 AM    COMPLETE CBC W/AUTO DIFF WBC    Reviewed

 

                    1/15/2011 12:00 AM    COMPREHEN METABOLIC PANEL    Reviewed

 

                    1/15/2011 12:00 AM    LIPID PANEL         Reviewed

 

                    2014 12:00 AM    MAMMOGRAM SCREENING    Reviewed

 

                    2014 12:00 AM    Screening mammography, bilateral    Reviewed

 

                    7/10/2014 12:00 AM    THER/PROPH/DIAG INJ SC/IM    Reviewed

 

                    7/10/2014 12:00 AM    B12 Injection, Up to 1000 Mcg NDC#5112-9890-46    Reviewed

 

                    2011 12:00 AM    COMPLETE CBC W/AUTO DIFF WBC    Reviewed

 

                    2011 12:00 AM    COMPREHEN METABOLIC PANEL    Reviewed

 

                    2011 12:00 AM    LIPID PANEL         Reviewed

 

                    2014 12:00 AM    B12 Injection, Up to 1000 Mcg NDC#7885-2471-00    Reviewed

 

                    10/19/2014 12:00 AM    MAMMOGRAM SCREENING    Reviewed

 

                    10/19/2014 12:00 AM    Screening mammography, bilateral    Reviewed

 

                    10/16/2014 12:00 AM    COMPLETE CBC W/AUTO DIFF WBC    Reviewed

 

                    10/16/2014 12:00 AM    COMPREHEN METABOLIC PANEL    Reviewed

 

                    10/16/2014 12:00 AM    IMMUNOASSAY TUMOR     Reviewed

 

                    10/16/2014 12:00 AM    LIPID PANEL         Reviewed

 

                    10/16/2014 12:00 AM    ASSAY OF LITHIUM    Reviewed

 

                    10/16/2014 12:00 AM    MAMMOGRAM SCREENING    Reviewed

 

                    2011 12:00 AM    ASSAY OF PARATHORMONE    Reviewed

 

                    2011 12:00 AM    VITAMIN D 25 HYDROXY    Reviewed

 

                    2011 12:00 AM    ASSAY OF LITHIUM    Reviewed

 

                    2011 12:00 AM    METABOLIC PANEL TOTAL CA    Reviewed

 

                    2011 12:00 AM    CT HEAD/BRAIN W/O & W/DYE    Reviewed

 

                    3/23/2015 12:00 AM    PNEUMOCOCCAL VACC 13 GLENDY IM    Reviewed

 

                    3/23/2015 12:00 AM    Vitamin B12 injection    Reviewed

 

                    2011 12:00 AM    ASSAY OF LITHIUM    Reviewed

 

                    2011 12:00 AM    B12 Injection Ndc#[address]-09  [facility]    Reviewed

 

                    2015 12:00 AM    THER/PROPH/DIAG INJ SC/IM    Reviewed

 

                    2015 12:00 AM    B12 Injection, Up to 1000 Mcg NDC#8183-7705-00    Reviewed

 

                    2015 12:00 AM    COMPLETE CBC W/AUTO DIFF WBC    Reviewed

 

                    2015 12:00 AM    COMPREHEN METABOLIC PANEL    Reviewed

 

                    2015 12:00 AM    LIPID PANEL         Reviewed

 

                    2015 12:00 AM    ASSAY OF LITHIUM    Reviewed

 

                    2011 12:00 AM    VIT D 1 25-DIHYDROXY    Reviewed

 

                    2011 12:00 AM    VITAMIN B-12        Reviewed

 

                    2015 12:00 AM    B12 Injection, Up to 1000 Mcg NDC#6284-3328-32    Reviewed

 

                    2015 12:00 AM    THER/PROPH/DIAG INJ SC/IM    Reviewed

 

                    2015 12:00 AM    B12 Injection, Up to 1000 Mcg NDC#6572-5965-29    Reviewed

 

                    2011 12:00 AM    THER/PROPH/DIAG INJ SC/IM    Reviewed

 

                    2011 12:00 AM    B12 Injection ([employer]) Ndc#8413-7936-73    Reviewed

 

                    2015 12:00 AM    THER/PROPH/DIAG INJ SC/IM    Reviewed

 

                    2015 12:00 AM    B12 Injection, Up to 1000 Mcg NDC#7911-2767-41    Reviewed







Results Summary







                          Data and Description      Results

 

                          2004 12:00 AM        Colonoscopy-Women and Men over 50 Normal 

 

                          2008 12:00 AM         Pap Smear Declined 

 

                          10/7/2009 12:00 AM        Cholest Cry Stone Ql .0 %LDLc SerPl-mCnc 123.0 mg/dLHDLc

 SerPl-mCnc 34.0 mg/dLTrigl SerPl-mCnc 190.0 mg/dLGlucose SerPl-mCnc 78.0 mg/dL

 

                          2009 12:00 AM        Mammogram -Women over 40 Normal HIV1+2 Ab Ser Ql no risk 

 

                          2010 8:47 AM         Dexa Bone Scan Refused Aspirin reccommended Contraindication 



 

                          2010 8:48 AM         Depression Done 

 

                          2010 12:00 AM         Foot Exam-Diabetic Done 

 

                          2010 12:00 AM         Cholest Cry Stone Ql .0 %LDLc SerPl-mCnc 126.0 mg/dLGlucose

 SerPl-mCnc 102.0 mg/dL

 

                          2010 8:45 AM          TRIGLYCERIDES 122.0 mg/dLCHOLESTEROL 186.0 mg/dLHDL 36.0 mg/dLTOT

 CHOL/HDL 5.2 LDL (CALC) 126.0 mg/dLGLUCOSE 102.0 mg/dLSODIUM 143.0 
mmol/LPOTASSIUM 3.70 mmol/LCHLORIDE 111.0 mmol/LCO2 23.0 mmol/LBUN 10.0 
mg/dLCREATININE 0.80 mg/dLSGOT/AST 12.0 IU/LSGPT/ALT 11.0 IU/LALK PHOS 65.0 
IU/LTOTAL PROTEIN 7.20 g/dLALBUMIN 3.90 g/dLTOTAL BILI 0.50 mg/dLCALCIUM 10.20 
mg/dLAGE 59 GFR NonAA 73 GFR AA 88 eGFR >60 mL/min/1.73 m2eGFR AA* >60 WBC 5.7 
RBC 3.26 HGB 10.60 g/dLHCT 31.70 %MCV 97.0 fLMCH 32.50 pgMCHC 33.40 g/dLRDW SD 
47 RDW CV 13.30 %MPV 9.70 fLPLT 287 NRBC# 0.00 NRBC% 0.0 %NEUT 62.90 %%LYMP 
21.80 %%MONO 9.90 %%EOS 5.0 %%BASO 0.40 %#NEUT 3.56 #LYMP 1.23 #MONO 0.56 #EOS 
0.28 #BASO 0.02 MANUAL DIFF NOT IND 

 

                          2010 12:00 AM        Glucose SerPl-mCnc 96.0 mg/dLCholest Cry Stone Ql .0 %LDLc

 SerPl-mCnc 146.0 mg/dL

 

                          2010 8:26 AM         TRIGLYCERIDES 106.0 mg/dLCHOLESTEROL 199.0 mg/dLHDL 32.0 mg/dLTOT

 CHOL/HDL 6.2 LDL (CALC) 146.0 mg/dLGLUCOSE 96.0 mg/dLSODIUM 143.0 
mmol/LPOTASSIUM 4.0 mmol/LCHLORIDE 113.0 mmol/LCO2 24.0 mmol/LBUN 13.0 
mg/dLCREATININE 1.0 mg/dLSGOT/AST 11.0 IU/LSGPT/ALT 6.0 IU/LALK PHOS 56.0 
IU/LTOTAL PROTEIN 6.60 g/dLALBUMIN 3.80 g/dLTOTAL BILI 0.50 mg/dLCALCIUM 9.30 
mg/dLAGE 59 GFR NonAA 57 GFR AA 69 eGFR 57 eGFR AA* >60 

 

                          10/6/2010 12:00 AM        Cholest Cry Stone Ql .0 %LDLc SerPl-mCnc 111.0 mg/dLGlucose

 SerPl-mCnc 81.0 mg/dL

 

                          10/6/2010 2:45 PM         TRIGLYCERIDES 123.0 mg/dLCHOLESTEROL 178.0 mg/dLHDL 42.0 mg/dLTOT

 CHOL/HDL 4.2 LDL (CALC) 111.0 mg/dLGLUCOSE 81.0 mg/dLSODIUM 139.0 
mmol/LPOTASSIUM 4.10 mmol/LCHLORIDE 106.0 mmol/LCO2 24.0 mmol/LBUN 13.0 
mg/dLCREATININE 0.90 mg/dLSGOT/AST 13.0 IU/LSGPT/ALT 11.0 IU/LALK PHOS 61.0 
IU/LTOTAL PROTEIN 7.10 g/dLALBUMIN 3.90 g/dLTOTAL BILI 0.30 mg/dLCALCIUM 9.30 
mg/dLAGE 60 GFR NonAA 64 GFR AA 78 eGFR >60 mL/min/1.73 m2eGFR AA* >60 WBC 6.9 
RBC 3.59 HGB 11.50 g/dLHCT 35.30 %MCV 98.0 fLMCH 32.0 pgMCHC 32.60 g/dLRDW SD 46
 RDW CV 12.90 %MPV 9.90 fLPLT 311 NRBC# 0.00 NRBC% 0.0 %NEUT 64.90 %%LYMP 22.50 
%%MONO 7.20 %%EOS 5.10 %%BASO 0.30 %#NEUT 4.45 #LYMP 1.54 #MONO 0.49 #EOS 0.35 
#BASO 0.02 MANUAL DIFF NOT IND 

 

                          2011 12:00 AM         Mammogram -Women over 40 Ordered 

 

                          2011 10:25 AM        TRIGLYCERIDES 111.0 mg/dLCHOLESTEROL 195.0 mg/dLHDL 43.0 mg/dLTOT

 CHOL/HDL 4.5 LDL (CALC) 130.0 mg/dLWBC 5.3 RBC 3.76 HGB 12.0 g/dLHCT 37.80 %MCV
 101.0 fLMCH 31.90 pgMCHC 31.70 g/dLRDW SD 47 RDW CV 13.0 %MPV 9.70 fLPLT 259 
NRBC# 0.00 NRBC% 0.0 %NEUT 69.0 %%LYMP 17.60 %%MONO 8.30 %%EOS 4.70 %%BASO 0.40 
%#NEUT 3.63 #LYMP 0.93 #MONO 0.44 #EOS 0.25 #BASO 0.02 MANUAL DIFF NOT IND 
GLUCOSE 102.0 mg/dLSODIUM 146.0 mmol/LPOTASSIUM 4.20 mmol/LCHLORIDE 113.0 
mmol/LCO2 23.0 mmol/LBUN 15.0 mg/dLCREATININE 1.0 mg/dLSGOT/AST 12.0 
IU/LSGPT/ALT 17.0 IU/LALK PHOS 60.0 IU/LTOTAL PROTEIN 6.90 g/dLALBUMIN 4.20 
g/dLTOTAL BILI 0.40 mg/dLCALCIUM 9.70 mg/dLAGE 60 GFR NonAA 57 GFR AA 69 eGFR 57
 eGFR AA* >60 

 

                          2011 11:49 AM        Cholest Cry Stone Ql .0 %LDLc SerPl-mCnc 130.0 mg/dLHDLc

 SerPl-mCnc 43.0 mg/dLTrigl SerPl-mCnc 111.0 mg/dLGlucose SerPl-mCnc 102.0 mg/dL

 

                          2011 11:52 AM        Pap Smear Declined 

 

                          2011 11:28 AM        Lithium 2.080 mmol/LGLUCOSE 102.0 mg/dLSODIUM 135.0 mmol/LPOTASSIUM

 3.90 mmol/LCHLORIDE 106.0 mmol/LCO2 21.0 mmol/LBUN 12.0 mg/dLCREATININE 1.30 
mg/dLCALCIUM 10.70 mg/dLAGE 60 GFR NonAA 42 GFR AA 51 eGFR 42 eGFR AA* 51 

 

                          2011 8:58 AM          Lithium 0.690 mmol/L

 

                          2011 2:38 PM         VITAMIN B12 3483.0 pg/mL

 

                          2013 3:35 PM          WBC 5.1 RBC 3.73 HGB 11.70 g/dLHCT 36.40 %MCV 98.0 fLMCH 31.40

 pgMCHC 32.10 g/dLRDW SD 47 RDW CV 13.10 %MPV 9.80 fLPLT 224 NRBC# 0.00 NRBC% 
0.0 %NEUT 66.80 %%LYMP 19.10 %%MONO 9.0 %%EOS 4.90 %%BASO 0.20 %#NEUT 3.42 #LYMP
 0.98 #MONO 0.46 #EOS 0.25 #BASO 0.01 MANUAL DIFF NOT IND GLUCOSE 88.0 
mg/dLSODIUM 141.0 mmol/LPOTASSIUM 4.10 mmol/LCHLORIDE 110.0 mmol/LCO2 22.0 
mmol/LBUN 22.0 mg/dLCREATININE 1.10 mg/dLCALCIUM 9.80 mg/dLAGE 62 GFR NonAA 50 
GFR AA 61 eGFR 50 eGFR AA* 60 Lithium 0.760 mmol/L

 

                          2013 11:02 AM        TRIGLYCERIDES 106.0 mg/dLCHOLESTEROL 181.0 mg/dLHDL 46.0 mg/dLTOT

 CHOL/HDL 3.9 LDL (CALC) 114.0 mg/dLVITAMIN D 41.10 ng/mL

 

                          10/17/2014 10:10 AM       WBC 5.0 RBC 3.66 HGB 11.60 g/dLHCT 36.80 %.0 fLMCH 31.70

 pgMCHC 31.50 g/dLRDW SD 50 RDW CV 13.50 %MPV 10.10 fLPLT 209 NRBC# 0.00 NRBC% 
0.0 %NEUT 69.20 %%LYMP 21.0 %%MONO 6.40 %%EOS 3.20 %%BASO 0.20 %#NEUT 3.46 #LYMP
 1.05 #MONO 0.32 #EOS 0.16 #BASO 0.01 MANUAL DIFF NOT IND GLUCOSE 100.0 
mg/dLSODIUM 148.0 mmol/LPOTASSIUM 3.90 mmol/LCHLORIDE 114.0 mmol/LCO2 26.0 
mmol/LBUN 12.0 mg/dLCREATININE 1.20 mg/dLSGOT/AST 9.0 IU/LSGPT/ALT <6 IU/LALK 
PHOS 82.0 IU/LTOTAL PROTEIN 6.90 g/dLALBUMIN 4.0 g/dLTOTAL BILI 0.40 
mg/dLCALCIUM 10.50 mg/dLAGE 64 GFR NonAA 45 GFR AA 55 eGFR 45 eGFR AA* 55 
TRIGLYCERIDES 96.0 mg/dLCHOLESTEROL 155.0 mg/dLHDL 38.0 mg/dLTOT CHOL/HDL 4.1 
LDL (CALC) 98.0 mg/dLLithium 0.850 mmol/LCancer Antigen (CA) 125 8.30 U/mL

 

                          2015 10:25 AM        Lithium 0.790 mmol/LWBC 4.8 RBC 3.44 HGB 11.0 g/dLHCT 35.20 

%.0 fLMCH 32.0 pgMCHC 31.30 g/dLRDW SD 53 RDW CV 14.0 %MPV 9.30 fLPLT 210
 NRBC# 0.00 NRBC% 0.0 %NEUT 70.80 %%LYMP 17.20 %%MONO 8.10 %%EOS 3.50 %%BASO 
0.40 %#NEUT 3.41 #LYMP 0.83 #MONO 0.39 #EOS 0.17 #BASO 0.02 MANUAL DIFF NOT IND 
TRIGLYCERIDES 107.0 mg/dLCHOLESTEROL 174.0 mg/dLHDL 43.0 mg/dLTOT CHOL/HDL 4.0 
LDL (CALC) 110.0 mg/dLGLUCOSE 90.0 mg/dLSODIUM 145.0 mmol/LPOTASSIUM 3.80 
mmol/LCHLORIDE 115.0 mmol/LCO2 24.0 mmol/LBUN 17.0 mg/dLCREATININE 1.30 
mg/dLSGOT/AST 18.0 IU/LSGPT/ALT 17.0 IU/LALK PHOS 56.0 IU/LTOTAL PROTEIN 6.70 
g/dLALBUMIN 3.90 g/dLTOTAL BILI 0.40 mg/dLCALCIUM 9.80 mg/dLAGE 64 GFR NonAA 41 
GFR AA 50 eGFR 41 eGFR AA* 50 

 

                          2015 8:50 AM        WBC 5.8 RBC 3.29 HGB 10.70 g/dLHCT 34.0 %.0 fLMCH 32.50

 pgMCHC 31.50 g/dLRDW SD 52 RDW CV 13.60 %MPV 9.60 fLPLT 223 NRBC# 0.00 NRBC% 
0.0 %NEUT 69.60 %%LYMP 18.90 %%MONO 8.50 %%EOS 2.80 %%BASO 0.20 %#NEUT 4.03 
#LYMP 1.09 #MONO 0.49 #EOS 0.16 #BASO 0.01 MANUAL DIFF NOT IND Lithium 0.620 
mmol/LGLUCOSE 83.0 mg/dLSODIUM 139.0 mmol/LPOTASSIUM 3.90 mmol/LCHLORIDE 109.0 
mmol/LCO2 22.0 mmol/LBUN 19.0 mg/dLCREATININE 1.40 mg/dLSGOT/AST 19.0 
IU/LSGPT/ALT 21.0 IU/LALK PHOS 55.0 IU/LTOTAL PROTEIN 6.50 g/dLALBUMIN 3.90 
g/dLTOTAL BILI 0.50 mg/dLCALCIUM 9.60 mg/dLAGE 65 GFR NonAA 38 GFR AA 46 eGFR 38
 eGFR AA* 46 TRIGLYCERIDES 121.0 mg/dLCHOLESTEROL 192.0 mg/dLHDL 51.0 mg/dLTOT 
CHOL/HDL 3.8 .0 mg/dLFREE T4 0.79 TSH 1.210 uIU/mLHemoglobin A1c 5.40 
%Estim. Avg Glu (eAG) 108 

 

                          3/15/2016 8:08 AM         VITAMIN B12 696.0 pg/mL

 

                          3/23/2016 8:26 AM         WBC 7.0 RBC 3.61 HGB 11.80 g/dLHCT 37.70 %.0 fLMCH 32.70

 pgMCHC 31.30 g/dLRDW SD 49 RDW CV 12.50 %MPV 10.0 fLPLT 207 NRBC# 0.00 NRBC% 
0.0 %NEUT 73.60 %%LYMP 16.40 %%MONO 6.60 %%EOS 3.0 %%BASO 0.30 %#NEUT 5.15 #LYMP
 1.15 #MONO 0.46 #EOS 0.21 #BASO 0.02 MANUAL DIFF NOT IND Lithium 0.940 
mmol/LGLUCOSE 108.0 mg/dLSODIUM 143.0 mmol/LPOTASSIUM 4.30 mmol/LCHLORIDE 110.0 
mmol/LCO2 27.0 mmol/LBUN 16.0 mg/dLCREATININE 1.60 mg/dLSGOT/AST 13.0 
IU/LSGPT/ALT 7.0 IU/LALK PHOS 71.0 IU/LTOTAL PROTEIN 6.80 g/dLALBUMIN 4.0 
g/dLTOTAL BILI 0.20 mg/dLCALCIUM 10.40 mg/dLAGE 65 GFR NonAA 32 GFR AA 39 eGFR 
32 eGFR AA* 39 TRIGLYCERIDES 113.0 mg/dLCHOLESTEROL 169.0 mg/dLHDL 42.0 mg/dLTOT
 CHOL/HDL 4.0 LDL (CALC) 104.0 mg/dLFREE T4 0.86 TSH 2.20 uIU/mLHemoglobin A1c 
5.20 %Estim. Avg Glu (eAG) 103 

 

                          3/25/2016 9:17 AM         COLOR YELLOW APPEARANCE CLEAR SPEC GRAV 1.010 pH 7.0 PROTEIN 

NEGATIVE GLUCOSE NEGATIVE mg/dLKETONE NEGATIVE BILIRUBIN NEGATIVE BLOOD NEGATIVE
 NITRITE NEGATIVE [place] SCREEN SMALL MICRO IND? SEE BELOW WBC/HPF 0-5 RBC/HPF 
NEGATIVE CASTS/LPF NEGATIVE /LPFCRYSTALS NEGATIVE MUCOUS THRDS NEGATIVE BACTERIA
 NEGATIVE EPITH CELLS FEW SQUAMOUS /HPFTRICHOMONAS NEGATIVE YEAST NEGATIVE 

 

                          2016 6:00 AM        GLUCOSE 91.0 mg/dLSODIUM 143.0 mmol/LPOTASSIUM 3.60 mmol/LCHLORIDE

 112.0 mmol/LCO2 23.0 mmol/LBUN 22.0 mg/dLCREATININE 1.20 mg/dLSGOT/AST 15.0 
IU/LSGPT/ALT 12.0 IU/LALK PHOS 61.0 IU/LTOTAL PROTEIN 5.40 g/dLALBUMIN 3.10 
g/dLTOTAL BILI 0.40 mg/dLCALCIUM 8.40 mg/dLAGE 66 GFR NonAA 45 GFR AA 55 eGFR 45
 eGFR AA* 55 WBC 3.0 RBC 3.05 HGB 9.80 g/dLHCT 32.10 %.0 fLMCH 32.10 
pgMCHC 30.50 g/dLRDW SD 54 RDW CV 14.20 %MPV 10.10 fLPLT 170 NRBC# 0.00 NRBC% 
0.0 %NEUT 50.70 %%LYMP 32.60 %%MONO 10.50 %%EOS 5.90 %%BASO 0.30 %#NEUT 1.54 
#LYMP 0.99 #MONO 0.32 #EOS 0.18 #BASO 0.01 MANUAL DIFF NOT IND 

 

                          2016 2:09 PM        COLOR YELLOW APPEARANCE CLEAR SPEC GRAV 1.010 pH 5.0 PROTEIN

 30 GLUCOSE NEGATIVE mg/dLKETONE NEGATIVE BILIRUBIN NEGATIVE BLOOD LARGE NITRITE
 NEGATIVE [place] SCREEN MODERATE MICRO INDICATED? SEE BELOW WBC/HPF  RBC/HPF
 20-50 CASTS/LPF NEGATIVE /LPFCRYSTALS NEGATIVE MUCOUS THRDS NEGATIVE BACTERIA 
NEGATIVE EPITH CELLS FEW SQUAMOUS /HPFTRICHOMONAS NEGATIVE YEAST NEGATIVE CULT 
SET UP? YES 

 

                          1/3/2017 4:08 PM          COLOR YELLOW APPEARANCE HAZY SPEC GRAV 1.015 pH 6.0 PROTEIN 30

 GLUCOSE NEGATIVE mg/dLKETONE NEGATIVE BILIRUBIN NEGATIVE BLOOD MODERATE NITRITE
 NEGATIVE [place] SCREEN LARGE MICRO INDICATED? SEE BELOW WBC/-200 RBC/HPF 
5-10 CASTS/LPF NEGATIVE /LPFCRYSTALS NEGATIVE MUCOUS THRDS NEGATIVE BACTERIA 
NEGATIVE EPITH CELLS 1+ SQUAMOUS /HPFTRICHOMONAS NEGATIVE YEAST NEGATIVE CULT 
SET UP? YES 







History Of Immunizations







       Name    Date Admin    Mfg Name    Mfg Code    Trade Name    Lot#    Route    Inj    Vis Given    Vis

 Pub                                    CVX

 

        Influenza    2008    Not Entered    NE      Not Entered            Not Entered    Not Entered

                    1            999

 

        X       12/19/2008    [employer]    MSD     Pneumovax 23            Intramuscular    Not Entered

                    1            999

 

           Influenza    10/15/2010    Opentopic Arely.    NOV        Fluvirin > 12 Years    

[id number]     Intramuscular    Left Deltoid    10/15/2010    2009    999

 

          X         3/23/2015    [facility]    [facility]       Prevnar 13    [id number]    Intramuscular

                Right Gluteous Medius    3/23/2015       2013       109







History of Past Illness







                    Name                Date of Onset       Comments

 

                    Peritoneal Neoplasm, Malignant                         

 

                    Hyperlipidemia                           

 

                    Bipolar disorder, unspecified                         

 

                    Artificial opening status; colostomy                         

 

                    B12 deficiency                           

 

                    Anemia, Pernicious                         

 

                    Arthritis unspecified                         

 

                    cervical cancer                          

 

                    Artificial opening status; colostomy    2010  1:10PM     

 

                    Bipolar disorder, unspecified    2010  1:10PM     

 

                    Hyperlipidemia      2010  1:10PM     

 

                    Anemia, Pernicious    2010  1:10PM     

 

                    Postoperative Follow-Up    2010  1:55PM     

 

                    Postoperative Follow-Up    Mar  8 2010 10:57AM     

 

                    Artificial opening status; colostomy    Mar  8 2010  1:19PM     

 

                    Peritoneal Neoplasm, Malignant    Mar  8 2010  1:19PM     

 

                    Artificial opening status; colostomy    2010  1:40PM     

 

                    Hyperlipidemia      2010  1:40PM     

 

                    Anemia, Pernicious    2010  1:40PM     

 

                    Peritoneal Neoplasm, Malignant    2010  1:40PM     

 

                    Arthritis unspecified    2010  1:40PM     

 

                    Anemia of Chronic Illness    2010  1:40PM     

 

                    Tinea corporis      2010  3:17PM     

 

                    Bipolar disorder, unspecified    2010  1:33PM     

 

                    Hyperlipidemia      2010  1:33PM     

 

                    Anemia, Pernicious    2010  1:33PM     

 

                    Peritoneal Neoplasm, Malignant    2010  1:33PM     

 

                    B12 deficiency      2010  1:33PM     

 

                    Ethmoidal Sinusitis, Acute    Sep 21 2010  3:53PM     

 

                    Wheezing            Sep 21 2010  3:53PM     

 

                    Flu                 Oct 15 2010  1:40PM     

 

                    Bipolar disorder, unspecified    Oct 15 2010  1:42PM     

 

                    Hyperlipidemia      Oct 15 2010  1:42PM     

 

                    Anemia, Pernicious    Oct 15 2010  1:42PM     

 

                    Peritoneal Neoplasm, Malignant    Oct 15 2010  1:42PM     

 

                    Bipolar disorder, unspecified    2011 12:01PM     

 

                    Hyperlipidemia      2011 12:01PM     

 

                    Anemia, Pernicious    2011 12:01PM     

 

                    Peritoneal Neoplasm, Malignant    2011 12:01PM     

 

                    Bipolar disorder, unspecified    Apr 15 2011 10:55AM     

 

                    Major Depression    2011 10:11AM     

 

                    Bipolar Disorder    2011 10:11AM     

 

                    Cancer              May 10 2011  4:16PM     

 

                    Major Depression    May 10 2011  3:16PM     

 

                    Bipolar Disorder    May 10 2011  3:16PM     

 

                    Hypercalcemia       May 23 2011  2:47PM     

 

                    Bipolar disorder, unspecified    May 23 2011  2:47PM     

 

                    Colon Cancer, Personal History    May 23 2011  2:47PM     

 

                    Bipolar Disorder    May 31 2011  4:39PM     

 

                    Depressive Disorder    2011 10:01AM     

 

                    Vitamin B12 deficiency    2011 10:01AM     

 

                    Vitamin D Deficiency    2011  5:07PM     

 

                    Anemia, Vitamin B12 Deficiency    2011  5:07PM     

 

                    B12 deficiency      2011  3:56PM     

 

                    Routine gynecological examination    Aug  4 2011  9:08AM     

 

                    Screening Examination for Breast Cancer    Aug  4 2011  9:08AM     

 

                    Tinea Corporis      Aug  4 2011  9:08AM     

 

                    Depressive Disorder    Sep 23 2011  8:47AM     

 

                    Contact Dermatitis    Sep 23 2011  8:47AM     

 

                    Anemia, Pernicious    Sep 23 2011  8:47AM     

 

                    B12 deficiency      Sep 23 2011  8:47AM     

 

                    B12 deficiency      Sep 27 2011  2:58PM     

 

                    B12 deficiency      Oct 20 2011  2:34PM     

 

                    Flu                 Dec  9 2011  3:16PM     

 

                    B12 deficiency      Dec  9 2011  3:17PM     

 

                    B12 deficiency      2012  4:52PM     

 

                    B12 deficiency      Feb 2012 11:10AM     

 

                    B12 deficiency      2012  3:37PM     

 

                    B12 deficiency      May  3 2012  4:10PM     

 

                    B12 deficiency      2012  2:54PM     

 

                    B12 deficiency      2012 11:23AM     

 

                    B12 deficiency      Aug  9 2012  2:08PM     

 

                    B12 deficiency      Sep  6 2012  4:36PM     

 

                    B12 deficiency      Oct 16 2012 10:23AM     

 

                    Flu                 Feb  4   3:11PM     

 

                    Bipolar disorder, unspecified    Feb  4   2:48PM     

 

                    Anemia, Pernicious    Feb  4   2:48PM     

 

                    B12 deficiency      Feb  4   2:48PM     

 

                    Extrapyramidal abnormal movement disorder    Feb  4   2:48PM     

 

                    B12 deficiency      Apr  3 2013 12:03PM     

 

                    Bipolar disorder, unspecified    May  7 2013  1:31PM     

 

                    Anemia, Pernicious    May  7 2013  1:31PM     

 

                    B12 deficiency      May  7 2013  1:31PM     

 

                    Extrapyramidal abnormal movement disorder    May  7 2013  1:31PM     

 

                    B12 deficiency      2013  3:42PM     

 

                    B12 deficiency      2013  1:31PM     

 

                    Hyperlipidemia      Aug  7 2013 10:37AM     

 

                    Vitamin D Deficiency    Aug  7 2013 10:37AM     

 

                    Bipolar disorder, unspecified    Aug  7 2013 10:37AM     

 

                    Anemia, Pernicious    Aug  7 2013 10:37AM     

 

                    B12 deficiency      Aug  7 2013 10:37AM     

 

                    B12 deficiency      Sep 25 2013 11:15AM     

 

                    B12 deficiency      Dec 11 2013  3:16PM     

 

                    B12 deficiency      Mar  6 2014  1:48PM     

 

                    B12 deficiency      May 21 2014  3:17PM     

 

                    Screening Examination for Breast Cancer    2014  3:23PM     

 

                    Periumbilical abdominal pain    2014  3:23PM     

 

                    B12 deficiency      Jul 10 2014  2:52PM     

 

                    Anemia, Vitamin B12 Deficiency    Aug 13 2014  4:50PM     

 

                    Bipolar disorder    Oct 16 2014 11:13AM     

 

                    Hyperlipidemia      Oct 16 2014 11:13AM     

 

                    Anemia, Pernicious    Oct 16 2014 11:13AM     

 

                    Peritoneal Neoplasm, Malignant    Oct 16 2014 11:13AM     

 

                    Screening breast examination    Oct 16 2014 11:13AM     

 

                    Weight loss         Oct 16 2014 11:13AM     

 

                    Anemia, Pernicious    Mar 23 2015  2:57PM     

 

                    B12 deficiency      Mar 23 2015  2:57PM     

 

                    Need for Prevnar vaccine    Mar 23 2015  2:57PM     

 

                    Bipolar disorder    Mar 23 2015  2:57PM     

 

                    Hyperlipidemia      Mar 23 2015  2:57PM     

 

                    Anemia, Pernicious    Mar 23 2015  2:57PM     

 

                    Peritoneal Neoplasm, Malignant    Mar 23 2015  2:57PM     

 

                    B12 deficiency      May  4 2015  4:48PM     

 

                    Hyperlipidemia      May 13 2015  9:56AM     

 

                    Anemia              May 13 2015  9:56AM     

 

                    Bipolar disorder    May 13 2015  9:56AM     

 

                    Bipolar disorder    May 14 2015  3:27PM     

 

                    Hyperlipidemia      May 14 2015  3:27PM     

 

                    Anemia, Pernicious    May 14 2015  3:27PM     

 

                    Peritoneal Neoplasm, Malignant    May 14 2015  3:27PM     

 

                    B12 deficiency      2015  2:20PM     

 

                    B12 deficiency      2015 11:34AM     

 

                    B12 deficiency      Aug 18 2015  9:06AM     

 

                    Tinea Corporis      Sep 18 2015  8:54AM     

 

                    B12 deficiency      Sep 18 2015  8:54AM     

 

                    B12 deficiency      2015 10:28AM     

 

                    Herpes zoster without complication    Dec  3 2015  9:52AM     

 

                    B12 deficiency      Dec 23 2015 11:21AM     

 

                    B12 deficiency      2016  4:51PM     

 

                    Vitamin B 12 deficiency    Mar 14 2016  5:35PM     

 

                    B12 deficiency      Mar 15 2016 12:14PM     

 

                    B12 deficiency      May  5 2016 11:30AM     

 

                    Edema               May  5 2016 11:30AM     

 

                    Dermatitis          May  5 2016 11:30AM     

 

                    Edema               May 17 2016  8:38AM     

 

                    Shortness of breath    May 17 2016  8:38AM     

 

                    Bilateral edema of lower extremity    2016  2:06PM     

 

                    B12 deficiency      2016  2:06PM     

 

                    B12 deficiency      2016 11:50AM     

 

                    B12 deficiency      2016 11:20AM     

 

                    Diarrhea            Aug  2 2016  3:13PM     

 

                    B12 deficiency      Aug 24 2016 11:10AM     

 

                    Encounter for screening mammogram for breast cancer    Aug 24 2016 11:44AM     

 

                    B12 deficiency      Sep 28 2016  2:35PM     

 

                    B12 deficiency      Dec 15 2016  2:02PM     

 

                    Dysuria             Dec 29 2016 12:14PM     

 

                    Hematuria           Fidencio  3 2017  1:33PM     







Payers







           Insurance Name    Company Name    Plan Name    Plan Number    Policy Number    Policy Group

 Number                                 Start Date

 

                    Medicare Part A    Medicare [facility]              [id number]              N/A

 

                          [employer]    [employer]                 [id number]

                                                    

 

                    Medicare Part A    Medicare - Lab/Xray              [id number]              2006

 

                    Medicare Part B    [employer] [facility] [place]              [id number]              2006

 

                          [employer]    [employer]                 50 percent

                                                    2009

 

                    [facility]              [id number]              N/A

 

                    Medicare Part A    Medicare Part A              [id number]              N/A

 

                    Medicare Part A    Medicare Part A              [id number]              2006









History of Encounters







                    Visit Date          Visit Type          Provider

 

                    2017            Office visit        Devin Masterson MD

 

                    12/15/2016          Nurse visit         Bhupinder Aspen DO

 

                    2016           Nurse visit         Bret Forte APRN

 

                    2016           Nurse visit         Bhupinder Aspen DO

 

                    2016            Office visit        Bhupinder Aspen DO

 

                    2016           Nurse visit         Bhupinder Aspen DO

 

                    2016           Office visit        Bret Forte APRN

 

                    2016            Office visit        Bhupinder Aspen DO

 

                    3/15/2016           Nurse visit         Bhupinder Aspen DO

 

                    2016            Nurse visit         Bhupinder Aspen DO

 

                    2015          Nurse visit         Bhupinder Aspen DO

 

                    12/3/2015           Office visit        Bhupinder Aspen DO

 

                    2015          Nurse visit         Bhupinder Aspen DO

 

                    2015           Office visit        Bhupinder Aspen DO

 

                    2015           Nurse visit         Bhupinder Aspen DO

 

                    2015            Nurse visit         Bhupinder Aspen DO

 

                    2015            Nurse visit         Bhupinder Aspen DO

 

                    2015           Office visit        Bhupinder Aspen DO

 

                    2015            Nurse visit         Bhupinder Aspen DO

 

                    3/23/2015           Office visit        Bhupinder Aspen DO

 

                    10/16/2014          Office visit        Bhupinder Aspen DO

 

                    2014           Nurse visit         Radha GREEN

 

                    7/10/2014           Nurse visit         Bhupinder Aspen DO

 

                    2014           Office visit        Bhupinder Aspen DO

 

                    2014           Nurse visit         Bhupinder Aspen DO

 

                    3/6/2014            Nurse visit         Bhupinder Aspen DO

 

                    2014            [facility]            EARNEST Lopez MD

 

                    2013          Nurse visit         Bhupinder Aspen DO

 

                    2013           Nurse visit         Bhupinder Aspen DO

 

                    2013            Office visit        Bhupinder Aspen DO

 

                    2013            Nurse visit         Bhupinder Aspen DO

 

                    2013            Nurse visit         Bhupinder Aspen DO

 

                    2013            Office visit        Bhupinder Aspen DO

 

                    4/3/2013            Nurse visit         Bhupinder Aspen DO

 

                    2013            Office visit        Bhupinder Aspen DO

 

                    10/16/2012          Nurse visit         Bhupinder Aspen DO

 

                    2012            Nurse visit         Bhupinder Aspen DO

 

                    2012            Voided              Bhupinder Aspen DO

 

                    2012            Nurse visit         Bhupinder Aspen DO

 

                    2012            Nurse visit         Bhupinder Aspen DO

 

                    2012           Nurse visit         Bhupinder Aspen DO

 

                    5/3/2012            Nurse visit         Bhupinder Aspen DO

 

                    2012           Nurse visit         Bhupinder Aspen DO

 

                    2012           Nurse visit         Bhupinder Aspen DO

 

                    2012           Nurse visit         Bhupinder Aspen DO

 

                    2011           Nurse visit         Bhupinder Aspen DO

 

                    10/20/2011          Nurse visit         Bhupinder Aspen DO

 

                    2011           Office visit        Bhupinder Aspen DO

 

                    2011           Nurse visit         Radha GREEN

 

                    2011            Office visit        Bhupinder Aspen DO

 

                    2011           Nurse visit         Bhupinder Aspen DO

 

                    2011            Office visit        Bhupinder Aspen DO

 

                    2011           Office visit        Bhupinder Aspen DO

 

                    5/10/2011           Office visit        Bhupinder Aspen DO

 

                    2011           Office visit        Bhupinder Aspen DO

 

                    4/15/2011           Office visit        Devin Angel DO

 

                    2011           Office visit        Devin Angel DO

 

                    10/15/2010          Office visit        Devin Angel DO

 

                    2010           Office visit        Devin Angel DO

 

                    2010            Office visit        Devin Angel DO

 

                    2010           Office visit        Devin Angel DO

 

                    2010            Office visit        Devin Angel DO

 

                    3/8/2010            Office visit        Devin Masterson MD

 

                    2010            Surgery             Devin Masterson MD

 

                    2010            Office visit        Devin Angel DO

 

                    2010           Surgery             Devin Masterson MD

 

                    2010           Hospital            Devin Masterson MD

 

                    2010           [facility]            Devin Masterson MD

 

                    10/22/2009          Office visit        Devin Angel DO

## 2019-06-26 NOTE — XMS REPORT
MU2 Ambulatory Summary

                             Created on: 2017



Pauline Gan

External Reference #: [id number]

: 1950

Sex: Female



Demographics







                          Address                   [address]

[place]  [id number]

 

                          Home Phone                [phone]

 

                          Preferred Language        English

 

                          Marital Status            Legally 

 

                          Worship Affiliation     Unknown

 

                          Race                      White

 

                          Ethnic Group              Not  or 





Author







                          Author                    Bhupinder Louise

 

                          [facility]

 

                          Address                   [address]

[place]  [id number]



 

                          Phone                     [phone]







Care Team Providers







                    Care Team Member Name    Role                Phone

 

                    Bhupinder Louise    PCP                 Unavailable

 

                    Bhupindre Louise    PreferredProvider    Unavailable







Allergies and Adverse Reactions







                    Name                Reaction            Notes

 

                    NO KNOWN DRUG ALLERGIES                         







Plan of Treatment







             Planned Activity    Comments     Planned Date    Planned Time    Plan/Goal

 

             Swallowing evaluation                 2017    12:00 AM      

 

             Urinalysis with C/S If Indicated.                 2017    12:00 AM      







Medications







                                        Active 

 

             Name         Start Date    Estimated Completion Date    SIG          Comments

 

                Latuda 20 mg oral tablet                                    take 1 tablet (20 mg) by oral route once daily with

 food (at least 350 calories)            

 

             pravastatin 40 mg oral tablet    3/30/2015                 TAKE 1 TABLET BY MOUTH DAILY     

 

                Namenda XR 28 mg oral capsule,sprinkle,ER 24hr    2015                       take 1 capsule (28

 mg) by oral route once daily            

 

                Namenda XR 28 mg oral capsule,sprinkle,ER 24hr    2016                       take 1 capsule (28

 mg) by oral route once daily            

 

                potassium chloride 10 mEq oral tablet extended release    2016                       take 1 tablet

 (10 meq) by oral route once daily       

 

             pravastatin 40 mg oral tablet    2016                 TAKE 1 TABLET BY MOUTH DAILY     

 

                Vitamin B-12 1,000 mcg/mL injection solution    2016                       inject 1 milliliter 

(1,000 mcg) by intramuscular route once a month     

 

                potassium chloride 10 mEq oral tablet extended release    2016                      take 1 tablet

 (10 meq) by oral route once daily       

 

                Namenda XR 28 mg oral capsule,sprinkle,ER 24hr    2016                      TAKE 1 CAPSULE BY

 MOUTH EVERY DAY                         

 

                furosemide 40 mg oral tablet    2016                      take 1 tablet (40 mg) by oral route

 once daily                              

 

                mirtazapine 45 mg oral tablet                                    take 1 tablet (45 mg) by oral route once daily

 before bedtime                          

 

             Fish Oil 300-1,000 mg oral capsule                              take 1 capsule by oral route daily     

 

             Vitamin D3 1,000 unit oral tablet                              take 1 tablet by oral route daily     

 

                Calcium 600 600 mg calcium (1,500 mg) oral tablet                                    take 1 tablet by oral route

 daily                                   

 

                Aspirin Low Dose 81 mg oral tablet,delayed release (DR/EC)                                    take 1 tablet 

(81 mg) by oral route once daily         









                                         

 

             Name         Start Date    Expiration Date    SIG          Comments

 

             Reglan 10mg    3/29/2010    2010    one ac and hs     

 

                Keflex 500 mg oral capsule    2010       10/1/2010       take 1 capsule (500 mg) by oral

 route every 6 hours for 10 days         

 

                Bactrim -160 mg oral tablet    2011       take 1 tablet by oral route

 every 12 hours for 7 days               

 

                triamcinolone acetonide 0.1 % topical cream    2011      apply a thin

 layer to the affected area(s) by topical route 2 times per day     

 

                sertraline 100 mg oral tablet    4/10/2012       5/10/2012       take 1.5 tablets by oral route

 daily for 30 days                       

 

                ergocalciferol (vitamin D2) 50,000 unit oral capsule    4/15/2013       2013       TAKE

 ONE CAPSULE BY MOUTH ONCE A WEEK        

 

                CYANOCOBALAM 1000MCGINJ 1000 milliliter    2013       INJECT 1ML INTRAMUSCULAR

 ONCE A MONTH                            

 

                pravastatin 40 mg oral tablet    3/25/2014       3/20/2015       TAKE ONE TABLET BY MOUTH EVERY

 DAY                                     

 

                          Zostavax (PF) 19,400 unit/0.65 mL subcutaneous suspension for reconstitution    3/23/2015

                    3/24/2015           inject 0.65 milliliter by subcutaneous route once     

 

                famciclovir 500 mg oral tablet    12/3/2015       12/10/2015      take 1 tablet (500 mg) by

 oral route every 8 hours for 7 days     

 

                furosemide 40 mg oral tablet    2016      take 1 tablet (40 mg) by oral

 route once daily                        

 

                Cipro 500 mg oral tablet    2016       take 1 tablet (500 mg) by oral route

 2 times per day for 5 days              

 

                Bactrim -160 mg oral tablet    2016        take 1 tablet by oral route

 every 12 hours for 7 days               

 

                metoclopramide HCl 10 mg oral tablet    2017       take 1 tablet by oral

 route 2 times a day for 50 days         

 

                Macrobid 100 mg oral capsule    2017       take 1 capsule (100 mg) by oral

 route 2 times per day with food for 7 days     

 

                Augmentin 875-125 mg oral tablet    2017       take 1 tablet by oral route

 every 12 hours for 7 days               

 

                amoxicillin 500 mg oral tablet    2017       take 1 tablet (500 mg) by oral

 route every 12 hours for 7 days         









                                        Discontinued 

 

             Name         Start Date    Discontinued Date    SIG          Comments

 

                Tylenol 325 mg oral tablet                    2013        take 1 - 2 tablets (325 -650 mg) by oral

 route every 4-6 hours as needed         

 

                Calcium 600 + D(3) 600 mg(1,500mg) -400 unit oral tablet                    2011       take 1 tablet

 by oral route 2 times a day            no longer taking

 

                Vitamin B-12 1,000 mcg oral tablet extended release    2010       take 1

 tablet by oral route daily             no longer taking

 

                Antifungal (clotrimazole) 1 % topical cream    2010       apply to the 

affected and surrounding areas of skin by topical route 2 times per day morning 
and evening                              

 

                sertraline 100 mg oral tablet    5/10/2011       2011       take 2 tablets (200 mg) by 

oral route once daily                   discontinued by Dr. Serrano

 

                mirtazapine 15 mg oral tablet                    2011        take 1 tablet (15 mg) by oral route 

once daily before bedtime               Dr. Serrano

 

                mirtazapine 15 mg oral tablet                    2011        take 1 tablet (15 mg) by oral route 

once daily before bedtime               dc'd by Dr. Serrano

 

                Pristiq 50 mg oral tablet extended release 24 hr                    2013        take 1 tablet (50

 mg) by oral route once daily           Dr. Zach Sarahstiq 50 mg oral tablet extended release 24 hr                    2013        take 1 tablet (50

 mg) by oral route once daily           dose updated

 

                Vitamin B-12 1,000 mcg/mL injection solution    2011        inject 1 milliliter

 (1,000 mcg) by intramuscular route once a month    on list already

 

                    syringe with needle 1 mL 25 gauge x 1" miscellaneous syringe    2011

                          use for injection once a month     

 

                clotrimazole 1 % topical cream    2011        apply to the affected and surrounding

 areas of skin by topical route 2 times per day in the morning and evening     

 

                Vitamin D2 50,000 unit oral capsule    2011        take 1 capsule (50,000

 unit) by oral route once weekly        generic on list

 

                Pravachol 40 mg oral tablet    2012        take 1 tablet (40 mg) by oral 

route once daily for 90 days            generic on list

 

                lithium carbonate 300 mg oral capsule    2012        take 1 capsule by oral

 route daily                            dose updated

 

                Pristiq 100 mg oral tablet extended release 24 hr                    4/10/2012       take 1 and 1/2 

tablet (150 mg) by oral route once daily    Mental Health provider

 

                Pristiq 100 mg oral tablet extended release 24 hr                    4/10/2012       take 1 and 1/2 

tablet (150 mg) by oral route once daily    Discontinued by [facility] at [facility]

 

                hydroxyzine HCl 50 mg oral tablet    10/16/2014      2015       take 1 tablet (50 mg) 

by oral route at bedtime                 

 

                lithium carbonate 300 mg oral capsule    2015       take 1 capsule (300

 mg) by oral route 2 for 30 days         

 

                fluconazole 100 mg oral tablet    2015       12/3/2015       take 1 tablet (100 mg) by 

oral route once a week                   

 

                ketoconazole 2 % topical cream    2015       12/3/2015       apply to the affected area(s)

 by topical route 2 times per day        

 

                prednisone 10 mg oral tablet    12/3/2015       2016        take 2 tablets (20 mg) by oral

 route once daily for 4 days 1 tablet daily for 4 days 0.5 tablet daily for 4 
days                                     

 

                triamcinolone acetonide 0.1 % topical cream    2016       apply a thin layer

 to the affected area(s) by topical route 2 times per day     

 

                Cipro 500 mg oral tablet    1/15/2017       2017       take 1 tablet (500 mg) by oral route

 every 12 hours for 10 days              







Problem List







                    Description         Status              Onset

 

                    Artificial opening status; colostomy    Active               

 

                    Bipolar disorder, unspecified    Active               

 

                    Hyperlipidemia      Active               

 

                    Peritoneal Neoplasm, Malignant    Active               

 

                    Anemia, Pernicious    Active               

 

                    Arthritis unspecified    Active               

 

                    B12 deficiency      Active               







Vital Signs







      Date    Time    BP-Sys(mm[Hg]    BP-Lynn(mm[Hg])    HR(bpm)    RR(rpm)    Temp    WT    HT    HC    BMI

                    BSA                 BMI Percentile      O2 Sat(%)

 

        2017    3:05:00 PM    134 mmHg    70 mmHg    70 bpm    20 rpm    97.4 F    181 lbs    69 in

                          26.73 kg/m2    2.00 m2                   98 %

 

        2017    11:07:00 AM    124 mmHg    64 mmHg    62 bpm    17 rpm    98.2 F    181.2 lbs    69

 in                       26.7583 kg/m    2.0003 m                 98 %

 

        1/15/2017    3:34:00 PM    148 mmHg    89 mmHg    69 bpm    20 rpm    98.2 F    179 lbs    69 in

                          26.43 kg/m2    1.99 m2                   98 %

 

       2017    1:51:00 PM    160 mmHg    90 mmHg    100 bpm    20 rpm    96.5 F    179 lbs             

                                                                98 %

 

       2016    3:11:00 PM    134 mmHg    76 mmHg    80 bpm    20 rpm    98 F    163 lbs    69 in     

                24.07 kg/m2     1.90 m2                         98 %

 

        2016    2:04:00 PM    142 mmHg    86 mmHg    68 bpm    16 rpm    98.5 F    166 lbs    63 in

                          29.4053 kg/m    1.8295 m                 100 %

 

        2016    11:27:00 AM    148 mmHg    78 mmHg    90 bpm    20 rpm    98.2 F    153 lbs    69 in

                          22.59 kg/m2    1.84 m2                   96 %

 

        12/3/2015    9:50:00 AM    132 mmHg    70 mmHg    62 bpm    16 rpm    97.9 F    145 lbs    69 in

                          21.4125 kg/m    1.7894 m                 100 %

 

        2015    8:52:00 AM    132 mmHg    68 mmHg    52 bpm    20 rpm    97.8 F    141 lbs    69 in

                          20.82 kg/m2    1.76 m2                   100 %

 

        2015    3:25:00 PM    120 mmHg    62 mmHg    72 bpm    16 rpm    98.1 F    136 lbs    69 in

                          20.0835 kg/m    1.733 m                 98 %

 

       3/23/2015    2:55:00 PM    130 mmHg    76 mmHg    68 bpm    18 rpm    97 F    140 lbs    69 in    

                20.67 kg/m2     1.76 m2                         98 %

 

        10/16/2014    11:11:00 AM    120 mmHg    66 mmHg    77 bpm    20 rpm    98 F    130 lbs    69 in

                          19.1974 kg/m    1.6943 m                 100 %

 

        2014    3:21:00 PM    130 mmHg    66 mmHg    63 bpm    18 rpm    97.2 F    160 lbs    69 in

                          23.6276 kg/m    1.88 m2                   99 %

 

        2013    10:35:00 AM    132 mmHg    70 mmHg    66 bpm    20 rpm    98.1 F    157 lbs    69 in

                          23.18 kg/m2    1.862 m                  

 

        2013    1:29:00 PM    132 mmHg    70 mmHg    76 bpm    18 rpm    98.2 F    166 lbs    69 in 

                          24.5137 kg/m    1.91 m2                    

 

       2013    2:46:00 PM    128 mmHg    70 mmHg    76 bpm    16 rpm    98 F    160 lbs    69 in     

                23.63 kg/m2     1.8797 m                       

 

        2011    8:49:00 AM    128 mmHg    78 mmHg    70 bpm    18 rpm    97.9 F    164 lbs    69 in

                          24.2183 kg/m    1.90 m2                    

 

     2011    1:31:00 PM    132 mmHg    68 mmHg    84 bpm         97 F    167 lbs                        

                                         

 

        2011    9:09:00 AM    128 mmHg    70 mmHg    72 bpm    18 rpm    98.2 F    163 lbs    64 in 

                          27.9786 kg/m    1.83 m2                    

 

       2011    10:01:00 AM    132 mmHg    70 mmHg    72 bpm    18 rpm    98.2 F    154 lbs             

                                                                 

 

       2011    2:47:00 PM    128 mmHg    70 mmHg    72 bpm    18 rpm    97.8 F    156 lbs             

                                                                 

 

       5/10/2011    3:16:00 PM    144 mmHg    80 mmHg    72 bpm    18 rpm    98.2 F    158 lbs             

                                                                 

 

        2011    10:11:00 AM    132 mmHg    70 mmHg    70 bpm    18 rpm    98.2 F    168 lbs    69 in

                          24.809 kg/m    1.93 m2                    

 

        4/15/2011    10:52:00 AM    110 mmHg    60 mmHg    75 bpm    16 rpm    97.5 F    172.375 lbs    

69 in                     25.46 kg/m2    1.951 m                 100 %

 

        2011    11:43:00 AM    120 mmHg    82 mmHg    75 bpm    16 rpm    97.2 F    178.5 lbs    69

 in                       26.3596 kg/m    1.99 m2                   100 %

 

        10/15/2010    1:32:00 PM    120 mmHg    70 mmHg    80 bpm    18 rpm    96.6 F    177 lbs    69 in

                          26.14 kg/m2    1.977 m                 100 %

 

        2010    3:50:00 PM    168 mmHg    100 mmHg    82 bpm    18 rpm    97.8 F    177.5 lbs    69

 in                       26.2119 kg/m    1.98 m2                   97 %

 

        2010    1:21:00 PM    140 mmHg    80 mmHg    59 bpm    16 rpm    97.6 F    173.25 lbs    69 

in                        25.58 kg/m2    1.956 m                 100 %

 

        2010    3:02:00 PM    140 mmHg    80 mmHg    61 bpm    16 rpm    97.6 F    173.125 lbs    69

 in                       25.5658 kg/m    1.96 m2                   99 %

 

        2010    1:23:00 PM    130 mmHg    80 mmHg    66 bpm    16 rpm    96.8 F    173 lbs    69 in 

                          25.55 kg/m2    1.9546 m                 100 %

 

        2010    12:58:00 PM    130 mmHg    88 mmHg    75 bpm    16 rpm    98.4 F    172.25 lbs    69

 in                       25.4366 kg/m    1.95 m2                   100 %







Social History







                    Name                Description         Comments

 

                    denies alcohol use                         

 

                    denies smoking                           

 

                    Denies illicit substance abuse                         

 

                    retired                                 direct care

 

                    Single                                   

 

                    Exercises regularly                         

 

                    Attended some college                         

 

                    Tobacco             Never smoker         







History of Procedures







                    Date Ordered        Description         Order Status

 

                    2010 12:00 AM    COMPREHEN METABOLIC PANEL    Reviewed

 

                    2010 12:00 AM    COMPLETE CBC W/AUTO DIFF WBC    Reviewed

 

                    2010 12:00 AM    LIPID PANEL         Reviewed

 

                          2015 12:00 AM        B12 Injection, Up to 1000 Mcg NDC#9160-5245-74 [facility] 

                                        Reviewed

 

                    2011 12:00 AM    MAMMOGRAM SCREENING    Reviewed

 

                    2011 12:00 AM    CYTOPATH C/V THIN LAYER    Reviewed

 

                    2011 12:00 AM    B12 Injection 1 cc NDC#[address]-74    Reviewed

 

                    2015 12:00 AM    THER/PROPH/DIAG INJ SC/IM    Reviewed

 

                    2015 12:00 AM    B12 Injection, Up to 1000 Mcg NDC#0946-5694-18    Reviewed

 

                    2011 12:00 AM    THER/PROPH/DIAG INJ SC/IM    Reviewed

 

                    2011 12:00 AM    B12 Injection([facility]) Ndc#0037-8716-08-    Reviewed

 

                    2015 12:00 AM    THER/PROPH/DIAG INJ SC/IM    Reviewed

 

                    2015 12:00 AM    B12 Injection, Up to 1000 Mcg NDC#0248-7535-79    Reviewed

 

                    10/20/2011 12:00 AM    THER/PROPH/DIAG INJ SC/IM    Reviewed

 

                    10/20/2011 12:00 AM    B12 Injection([place]) Ndc#2644-3058-84-    Reviewed

 

                    2016 12:00 AM    THER/PROPH/DIAG INJ SC/IM    Reviewed

 

                    2016 12:00 AM    B12 Injection, Up to 1000 Mcg NDC#6756-9802-50    Reviewed

 

                    3/14/2016 12:00 AM    VITAMIN B-12        Reviewed

 

                    3/15/2016 12:00 AM    THER/PROPH/DIAG INJ SC/IM    Reviewed

 

                    3/15/2016 12:00 AM    B12 Injection, Up to 1000 Mcg NDC#9729-5216-74    Reviewed

 

                    2011 12:00 AM    ***Immunization administration, Medicare flu    Reviewed

 

                    2011 12:00 AM    Fluzone ** MEDICARE Only **    Reviewed

 

                    2011 12:00 AM    THER/PROPH/DIAG INJ SC/IM    Reviewed

 

                    2011 12:00 AM    B12 Injection ([facility]) Ndc#5000-0629-87    Reviewed

 

                    2016 12:00 AM    B12 Injection, Up to 1000 Mcg NDC#3711-9927-14 [facility]    

Reviewed

 

                    2016 12:00 AM    TTE W/DOPPLER COMPLETE    Reviewed

 

                    2016 12:00 AM    EXTREMITY STUDY     Reviewed

 

                          2016 12:00 AM        B12 Injection, Up to 1000 Mcg NDC#1973-5746-62 [facility] 

                                        Reviewed

 

                    2016 12:00 AM    THER/PROPH/DIAG INJ SC/IM    Reviewed

 

                    2016 12:00 AM    B12 Injection, Up to 1000 Mcg NDC#7932-9687-84    Reviewed

 

                    2016 12:00 AM    THER/PROPH/DIAG INJ SC/IM    Reviewed

 

                    2012 12:00 AM    B12 Injection([place]) Ndc#3829-0222-26-    Reviewed

 

                    2016 12:00 AM    B12 Injection, Up to 1000 Mcg NDC#0311-0765-76    Reviewed

 

                    2016 12:00 AM    THER/PROPH/DIAG INJ SC/IM    Reviewed

 

                    2012 12:00 AM    THER/PROPH/DIAG INJ SC/IM    Reviewed

 

                    2012 12:00 AM    B12 Injection ([employer] [facility]) Ndc#1791-0598-44    Reviewed

 

                    2016 12:00 AM    THER/PROPH/DIAG INJ SC/IM    Reviewed

 

                    2016 12:00 AM    B12 Injection, Up to 1000 Mcg NDC#8248-8529-48    Reviewed

 

                    2016 12:00 AM    B12 Injection, Up to 1000 Mcg NDC#2991-0776-49    Reviewed

 

                    2016 12:00 AM    THER/PROPH/DIAG INJ SC/IM    Reviewed

 

                    2012 12:00 AM    THER/PROPH/DIAG INJ SC/IM    Reviewed

 

                    2012 12:00 AM    B12 Injection([place]) Ndc#9140-5194-78-    Reviewed

 

                    12/15/2016 12:00 AM    B12 Injection, Up to 1000 Mcg NDC#8590-3319-33    Reviewed

 

                    12/15/2016 12:00 AM    THER/PROPH/DIAG INJ SC/IM    Reviewed

 

                    2016 12:00 AM    URNLS DIP STICK/TABLET RGNT AUTO W/O MICROSCOPY    Returned

 

                    1/3/2017 12:00 AM    URNLS DIP STICK/TABLET RGNT AUTO W/O MICROSCOPY    Returned

 

                    2017 12:00 AM    URINE CULTURE/COLONY COUNT    Returned

 

                    2017 12:00 AM    Rocephin 1 gram Injection, [facility]    Reviewed

 

                    2017 12:00 AM    THERAPEUTIC PROPHYLACTIC/DX INJECTION SUBQ/IM    Reviewed

 

                    2017 12:00 AM    B12 1000mcg Injection, [facility]    Reviewed

 

                    5/3/2012 12:00 AM    THER/PROPH/DIAG INJ SC/IM    Reviewed

 

                    5/3/2012 12:00 AM    B12 Injection([place]) Ndc#6303-1134-11-    Reviewed

 

                    2017 12:00 AM    THERAPEUTIC PROPHYLACTIC/DX INJECTION SUBQ/IM    Reviewed

 

                    2017 12:00 AM    B12 1000mcg Injection, [facility]    Reviewed

 

                    2017 12:00 AM    MRI BRAIN STEM W/O & W/DYE    Reviewed

 

                    2017 12:00 AM    VITAMIN B-12        Reviewed

 

                    2017 12:00 AM    Speech Therapy Consult    Reviewed

 

                    2017 12:00 AM    ASSAY OF CREATININE    Reviewed

 

                    2012 12:00 AM    IMMUNOTHERAPY INJECTIONS    Reviewed

 

                    2012 12:00 AM    B12 Injection([place]) Ndc#4283-1769-23-    Reviewed

 

                    2012 12:00 AM    THER/PROPH/DIAG INJ SC/IM    Reviewed

 

                    2012 12:00 AM    B12 Injection, Up to 1000 Mcg NDC#5192-6422-01    Reviewed

 

                    2012 12:00 AM    THER/PROPH/DIAG INJ SC/IM    Reviewed

 

                    2012 12:00 AM    B12 Injection, Up to 1000 Mcg NDC#1232-5944-14    Reviewed

 

                    2012 12:00 AM    THER/PROPH/DIAG INJ SC/IM    Reviewed

 

                    2012 12:00 AM    B12 Injection, Up to 1000 Mcg NDC#5090-0322-01    Reviewed

 

                    10/16/2012 12:00 AM    THER/PROPH/DIAG INJ SC/IM    Reviewed

 

                    10/16/2012 12:00 AM    B12 Injection, Up to 1000 Mcg NDC#4872-5502-60    Reviewed

 

                    2010 12:00 AM    COMPREHEN METABOLIC PANEL    Reviewed

 

                    2010 12:00 AM    COMPLETE CBC W/AUTO DIFF WBC    Reviewed

 

                    2010 12:00 AM    LIPID PANEL         Reviewed

 

                    2013 12:00 AM    Flu Injection 3 Years And Above NDC# [address]-73  [facility]    Reviewed



 

                    2013 12:00 AM    COMPLETE CBC W/AUTO DIFF WBC    Reviewed

 

                    2013 12:00 AM    ASSAY OF LITHIUM    Reviewed

 

                    2013 12:00 AM    METABOLIC PANEL TOTAL CA    Reviewed

 

                    4/3/2013 12:00 AM    THER/PROPH/DIAG INJ SC/IM    Reviewed

 

                    4/3/2013 12:00 AM    B12 Injection, Up to 1000 Mcg NDC#5574-3759-88    Reviewed

 

                    2013 12:00 AM    THER/PROPH/DIAG INJ SC/IM    Reviewed

 

                    2013 12:00 AM    B12 Injection, Up to 1000 Mcg NDC#5673-7592-66    Reviewed

 

                    2013 12:00 AM    THER/PROPH/DIAG INJ SC/IM    Reviewed

 

                    2013 12:00 AM    B12 Injection, Up to 1000 Mcg NDC#1562-5724-04    Reviewed

 

                    2013 12:00 AM    LIPID PANEL         Reviewed

 

                    2013 12:00 AM    VITAMIN D 25 HYDROXY    Reviewed

 

                    2013 12:00 AM    THER/PROPH/DIAG INJ SC/IM    Reviewed

 

                    2013 12:00 AM    B12 Injection, Up to 1000 Mcg NDC#2264-0284-55    Reviewed

 

                    2013 12:00 AM    THER/PROPH/DIAG INJ SC/IM    Reviewed

 

                    3/6/2014 12:00 AM    THER/PROPH/DIAG INJ SC/IM    Reviewed

 

                    2014 12:00 AM    THER/PROPH/DIAG INJ SC/IM    Reviewed

 

                    2014 12:00 AM    B12 Injection, Up to 1000 Mcg NDC#0229-3899-70    Reviewed

 

                    2010 12:00 AM    SKIN FUNGI CULTURE    Reviewed

 

                    10/9/2010 12:00 AM    COMPREHEN METABOLIC PANEL    Reviewed

 

                    10/9/2010 12:00 AM    LIPID PANEL         Reviewed

 

                    2010 12:00 AM    THER/PROPH/DIAG INJ SC/IM    Reviewed

 

                    2010 12:00 AM    B12 Injection Ndc#[address]-60 ([place])    Reviewed

 

                    2010 12:00 AM    THER/PROPH/DIAG INJ SC/IM    Reviewed

 

                    2010 12:00 AM    Kenalog 40 Mg Im-Ndc#[address]-75 ([facility])    Reviewed

 

                    10/15/2010 12:00 AM    FLU VACCINE 3 YRS & > IM    Reviewed

 

                    10/15/2010 12:00 AM    Admin.Of M/C Cov.Vaccine-Flu Vacc.    Reviewed

 

                    1/15/2011 12:00 AM    COMPLETE CBC W/AUTO DIFF WBC    Reviewed

 

                    1/15/2011 12:00 AM    COMPREHEN METABOLIC PANEL    Reviewed

 

                    1/15/2011 12:00 AM    LIPID PANEL         Reviewed

 

                    2014 12:00 AM    MAMMOGRAM SCREENING    Reviewed

 

                    2014 12:00 AM    Screening mammography, bilateral    Reviewed

 

                    7/10/2014 12:00 AM    THER/PROPH/DIAG INJ SC/IM    Reviewed

 

                    7/10/2014 12:00 AM    B12 Injection, Up to 1000 Mcg NDC#1092-2556-75    Reviewed

 

                    2011 12:00 AM    COMPLETE CBC W/AUTO DIFF WBC    Reviewed

 

                    2011 12:00 AM    COMPREHEN METABOLIC PANEL    Reviewed

 

                    2011 12:00 AM    LIPID PANEL         Reviewed

 

                    2014 12:00 AM    B12 Injection, Up to 1000 Mcg NDC#8290-0150-91    Reviewed

 

                    10/19/2014 12:00 AM    MAMMOGRAM SCREENING    Reviewed

 

                    10/19/2014 12:00 AM    Screening mammography, bilateral    Reviewed

 

                    10/16/2014 12:00 AM    B12 Injection, Up to 1000 Mcg NDC#0527-3631-90    Reviewed

 

                    10/16/2014 12:00 AM    COMPLETE CBC W/AUTO DIFF WBC    Reviewed

 

                    10/16/2014 12:00 AM    COMPREHEN METABOLIC PANEL    Reviewed

 

                    10/16/2014 12:00 AM    IMMUNOASSAY TUMOR     Reviewed

 

                    10/16/2014 12:00 AM    LIPID PANEL         Reviewed

 

                    10/16/2014 12:00 AM    ASSAY OF LITHIUM    Reviewed

 

                    10/16/2014 12:00 AM    MAMMOGRAM SCREENING    Reviewed

 

                    2011 12:00 AM    ASSAY OF PARATHORMONE    Reviewed

 

                    2011 12:00 AM    VITAMIN D 25 HYDROXY    Reviewed

 

                    2011 12:00 AM    ASSAY OF LITHIUM    Reviewed

 

                    2011 12:00 AM    METABOLIC PANEL TOTAL CA    Reviewed

 

                    2011 12:00 AM    CT HEAD/BRAIN W/O & W/DYE    Reviewed

 

                    3/23/2015 12:00 AM    PNEUMOCOCCAL VACC 13 GLENDY IM    Reviewed

 

                    3/23/2015 12:00 AM    Vitamin B12 injection    Reviewed

 

                    2011 12:00 AM    ASSAY OF LITHIUM    Reviewed

 

                    2011 12:00 AM    B12 Injection Ndc#[address]-00  [facility]    Reviewed

 

                    2015 12:00 AM    THER/PROPH/DIAG INJ SC/IM    Reviewed

 

                    2015 12:00 AM    B12 Injection, Up to 1000 Mcg NDC#4838-9208-37    Reviewed

 

                    2015 12:00 AM    COMPLETE CBC W/AUTO DIFF WBC    Reviewed

 

                    2015 12:00 AM    COMPREHEN METABOLIC PANEL    Reviewed

 

                    2015 12:00 AM    LIPID PANEL         Reviewed

 

                    2015 12:00 AM    ASSAY OF LITHIUM    Reviewed

 

                    2011 12:00 AM    VIT D 1 25-DIHYDROXY    Reviewed

 

                    2011 12:00 AM    VITAMIN B-12        Reviewed

 

                    2015 12:00 AM    B12 Injection, Up to 1000 Mcg NDC#8060-4689-08    Reviewed

 

                    2015 12:00 AM    THER/PROPH/DIAG INJ SC/IM    Reviewed

 

                    2015 12:00 AM    B12 Injection, Up to 1000 Mcg NDC#7717-6290-76    Reviewed

 

                    2011 12:00 AM    THER/PROPH/DIAG INJ SC/IM    Reviewed

 

                    2011 12:00 AM    B12 Injection ([employer]) Ndc#6817-4618-37    Reviewed

 

                    2015 12:00 AM    THER/PROPH/DIAG INJ SC/IM    Reviewed

 

                    2015 12:00 AM    B12 Injection, Up to 1000 Mcg NDC#4690-8216-11    Reviewed







Results Summary







                          Data and Description      Results

 

                          2004 12:00 AM        Colonoscopy-Women and Men over 50 Normal 

 

                          2008 12:00 AM         Pap Smear Declined 

 

                          10/7/2009 12:00 AM        Cholest Cry Stone Ql .0 %LDLc SerPl-mCnc 123.0 mg/dLHDLc

 SerPl-mCnc 34.0 mg/dLTrigl SerPl-mCnc 190.0 mg/dLGlucose SerPl-mCnc 78.0 mg/dL

 

                          2009 12:00 AM        Mammogram -Women over 40 Normal HIV1+2 Ab Ser Ql no risk 

 

                          2010 8:47 AM         Dexa Bone Scan Refused Aspirin reccommended Contraindication 



 

                          2010 8:48 AM         Depression Done 

 

                          2010 12:00 AM         Foot Exam-Diabetic Done 

 

                          2010 12:00 AM         Cholest Cry Stone Ql .0 %LDLc SerPl-mCnc 126.0 mg/dLGlucose

 SerPl-mCnc 102.0 mg/dL

 

                          2010 8:45 AM          TRIGLYCERIDES 122.0 mg/dLCHOLESTEROL 186.0 mg/dLHDL 36.0 mg/dLTOT

 CHOL/HDL 5.2 LDL (CALC) 126.0 mg/dLGLUCOSE 102.0 mg/dLSODIUM 143.0 
mmol/LPOTASSIUM 3.70 mmol/LCHLORIDE 111.0 mmol/LCO2 23.0 mmol/LBUN 10.0 
mg/dLCREATININE 0.80 mg/dLSGOT/AST 12.0 IU/LSGPT/ALT 11.0 IU/LALK PHOS 65.0 
IU/LTOTAL PROTEIN 7.20 g/dLALBUMIN 3.90 g/dLTOTAL BILI 0.50 mg/dLCALCIUM 10.20 
mg/dLAGE 59 GFR NonAA 73 GFR AA 88 eGFR >60 mL/min/1.73 m2eGFR AA* >60 WBC 5.7 
RBC 3.26 HGB 10.60 g/dLHCT 31.70 %MCV 97.0 fLMCH 32.50 pgMCHC 33.40 g/dLRDW SD 
47 RDW CV 13.30 %MPV 9.70 fLPLT 287 NRBC# 0.00 NRBC% 0.0 %NEUT 62.90 %%LYMP 
21.80 %%MONO 9.90 %%EOS 5.0 %%BASO 0.40 %#NEUT 3.56 #LYMP 1.23 #MONO 0.56 #EOS 
0.28 #BASO 0.02 MANUAL DIFF NOT IND 

 

                          2010 12:00 AM        Glucose SerPl-mCnc 96.0 mg/dLCholest Cry Stone Ql .0 %LDLc

 SerPl-mCnc 146.0 mg/dL

 

                          2010 8:26 AM         TRIGLYCERIDES 106.0 mg/dLCHOLESTEROL 199.0 mg/dLHDL 32.0 mg/dLTOT

 CHOL/HDL 6.2 LDL (CALC) 146.0 mg/dLGLUCOSE 96.0 mg/dLSODIUM 143.0 
mmol/LPOTASSIUM 4.0 mmol/LCHLORIDE 113.0 mmol/LCO2 24.0 mmol/LBUN 13.0 
mg/dLCREATININE 1.0 mg/dLSGOT/AST 11.0 IU/LSGPT/ALT 6.0 IU/LALK PHOS 56.0 
IU/LTOTAL PROTEIN 6.60 g/dLALBUMIN 3.80 g/dLTOTAL BILI 0.50 mg/dLCALCIUM 9.30 
mg/dLAGE 59 GFR NonAA 57 GFR AA 69 eGFR 57 eGFR AA* >60 

 

                          10/6/2010 12:00 AM        Cholest Cry Stone Ql .0 %LDLc SerPl-mCnc 111.0 mg/dLGlucose

 SerPl-mCnc 81.0 mg/dL

 

                          10/6/2010 2:45 PM         TRIGLYCERIDES 123.0 mg/dLCHOLESTEROL 178.0 mg/dLHDL 42.0 mg/dLTOT

 CHOL/HDL 4.2 LDL (CALC) 111.0 mg/dLGLUCOSE 81.0 mg/dLSODIUM 139.0 
mmol/LPOTASSIUM 4.10 mmol/LCHLORIDE 106.0 mmol/LCO2 24.0 mmol/LBUN 13.0 
mg/dLCREATININE 0.90 mg/dLSGOT/AST 13.0 IU/LSGPT/ALT 11.0 IU/LALK PHOS 61.0 
IU/LTOTAL PROTEIN 7.10 g/dLALBUMIN 3.90 g/dLTOTAL BILI 0.30 mg/dLCALCIUM 9.30 
mg/dLAGE 60 GFR NonAA 64 GFR AA 78 eGFR >60 mL/min/1.73 m2eGFR AA* >60 WBC 6.9 
RBC 3.59 HGB 11.50 g/dLHCT 35.30 %MCV 98.0 fLMCH 32.0 pgMCHC 32.60 g/dLRDW SD 46
 RDW CV 12.90 %MPV 9.90 fLPLT 311 NRBC# 0.00 NRBC% 0.0 %NEUT 64.90 %%LYMP 22.50 
%%MONO 7.20 %%EOS 5.10 %%BASO 0.30 %#NEUT 4.45 #LYMP 1.54 #MONO 0.49 #EOS 0.35 
#BASO 0.02 MANUAL DIFF NOT IND 

 

                          2011 12:00 AM         Mammogram -Women over 40 Ordered 

 

                          2011 10:25 AM        TRIGLYCERIDES 111.0 mg/dLCHOLESTEROL 195.0 mg/dLHDL 43.0 mg/dLTOT

 CHOL/HDL 4.5 LDL (CALC) 130.0 mg/dLWBC 5.3 RBC 3.76 HGB 12.0 g/dLHCT 37.80 %MCV
 101.0 fLMCH 31.90 pgMCHC 31.70 g/dLRDW SD 47 RDW CV 13.0 %MPV 9.70 fLPLT 259 
NRBC# 0.00 NRBC% 0.0 %NEUT 69.0 %%LYMP 17.60 %%MONO 8.30 %%EOS 4.70 %%BASO 0.40 
%#NEUT 3.63 #LYMP 0.93 #MONO 0.44 #EOS 0.25 #BASO 0.02 MANUAL DIFF NOT IND 
GLUCOSE 102.0 mg/dLSODIUM 146.0 mmol/LPOTASSIUM 4.20 mmol/LCHLORIDE 113.0 
mmol/LCO2 23.0 mmol/LBUN 15.0 mg/dLCREATININE 1.0 mg/dLSGOT/AST 12.0 
IU/LSGPT/ALT 17.0 IU/LALK PHOS 60.0 IU/LTOTAL PROTEIN 6.90 g/dLALBUMIN 4.20 
g/dLTOTAL BILI 0.40 mg/dLCALCIUM 9.70 mg/dLAGE 60 GFR NonAA 57 GFR AA 69 eGFR 57
 eGFR AA* >60 

 

                          2011 11:49 AM        Cholest Cry Stone Ql .0 %LDLc SerPl-mCnc 130.0 mg/dLHDLc

 SerPl-mCnc 43.0 mg/dLTrigl SerPl-mCnc 111.0 mg/dLGlucose SerPl-mCnc 102.0 mg/dL

 

                          2011 11:52 AM        Pap Smear Declined 

 

                          2011 11:28 AM        Lithium 2.080 mmol/LGLUCOSE 102.0 mg/dLSODIUM 135.0 mmol/LPOTASSIUM

 3.90 mmol/LCHLORIDE 106.0 mmol/LCO2 21.0 mmol/LBUN 12.0 mg/dLCREATININE 1.30 
mg/dLCALCIUM 10.70 mg/dLAGE 60 GFR NonAA 42 GFR AA 51 eGFR 42 eGFR AA* 51 

 

                          2011 8:58 AM          Lithium 0.690 mmol/L

 

                          2011 2:38 PM         VITAMIN B12 3483.0 pg/mL

 

                          2013 3:35 PM          WBC 5.1 RBC 3.73 HGB 11.70 g/dLHCT 36.40 %MCV 98.0 fLMCH 31.40

 pgMCHC 32.10 g/dLRDW SD 47 RDW CV 13.10 %MPV 9.80 fLPLT 224 NRBC# 0.00 NRBC% 
0.0 %NEUT 66.80 %%LYMP 19.10 %%MONO 9.0 %%EOS 4.90 %%BASO 0.20 %#NEUT 3.42 #LYMP
 0.98 #MONO 0.46 #EOS 0.25 #BASO 0.01 MANUAL DIFF NOT IND GLUCOSE 88.0 
mg/dLSODIUM 141.0 mmol/LPOTASSIUM 4.10 mmol/LCHLORIDE 110.0 mmol/LCO2 22.0 
mmol/LBUN 22.0 mg/dLCREATININE 1.10 mg/dLCALCIUM 9.80 mg/dLAGE 62 GFR NonAA 50 
GFR AA 61 eGFR 50 eGFR AA* 60 Lithium 0.760 mmol/L

 

                          2013 11:02 AM        TRIGLYCERIDES 106.0 mg/dLCHOLESTEROL 181.0 mg/dLHDL 46.0 mg/dLTOT

 CHOL/HDL 3.9 LDL (CALC) 114.0 mg/dLVITAMIN D 41.10 ng/mL

 

                          10/17/2014 10:10 AM       WBC 5.0 RBC 3.66 HGB 11.60 g/dLHCT 36.80 %.0 fLMCH 31.70

 pgMCHC 31.50 g/dLRDW SD 50 RDW CV 13.50 %MPV 10.10 fLPLT 209 NRBC# 0.00 NRBC% 
0.0 %NEUT 69.20 %%LYMP 21.0 %%MONO 6.40 %%EOS 3.20 %%BASO 0.20 %#NEUT 3.46 #LYMP
 1.05 #MONO 0.32 #EOS 0.16 #BASO 0.01 MANUAL DIFF NOT IND GLUCOSE 100.0 
mg/dLSODIUM 148.0 mmol/LPOTASSIUM 3.90 mmol/LCHLORIDE 114.0 mmol/LCO2 26.0 
mmol/LBUN 12.0 mg/dLCREATININE 1.20 mg/dLSGOT/AST 9.0 IU/LSGPT/ALT <6 IU/LALK 
PHOS 82.0 IU/LTOTAL PROTEIN 6.90 g/dLALBUMIN 4.0 g/dLTOTAL BILI 0.40 
mg/dLCALCIUM 10.50 mg/dLAGE 64 GFR NonAA 45 GFR AA 55 eGFR 45 eGFR AA* 55 
TRIGLYCERIDES 96.0 mg/dLCHOLESTEROL 155.0 mg/dLHDL 38.0 mg/dLTOT CHOL/HDL 4.1 
LDL (CALC) 98.0 mg/dLLithium 0.850 mmol/LCancer Antigen (CA) 125 8.30 U/mL

 

                          2015 10:25 AM        Lithium 0.790 mmol/LWBC 4.8 RBC 3.44 HGB 11.0 g/dLHCT 35.20 

%.0 fLMCH 32.0 pgMCHC 31.30 g/dLRDW SD 53 RDW CV 14.0 %MPV 9.30 fLPLT 210
 NRBC# 0.00 NRBC% 0.0 %NEUT 70.80 %%LYMP 17.20 %%MONO 8.10 %%EOS 3.50 %%BASO 
0.40 %#NEUT 3.41 #LYMP 0.83 #MONO 0.39 #EOS 0.17 #BASO 0.02 MANUAL DIFF NOT IND 
TRIGLYCERIDES 107.0 mg/dLCHOLESTEROL 174.0 mg/dLHDL 43.0 mg/dLTOT CHOL/HDL 4.0 
LDL (CALC) 110.0 mg/dLGLUCOSE 90.0 mg/dLSODIUM 145.0 mmol/LPOTASSIUM 3.80 
mmol/LCHLORIDE 115.0 mmol/LCO2 24.0 mmol/LBUN 17.0 mg/dLCREATININE 1.30 
mg/dLSGOT/AST 18.0 IU/LSGPT/ALT 17.0 IU/LALK PHOS 56.0 IU/LTOTAL PROTEIN 6.70 
g/dLALBUMIN 3.90 g/dLTOTAL BILI 0.40 mg/dLCALCIUM 9.80 mg/dLAGE 64 GFR NonAA 41 
GFR AA 50 eGFR 41 eGFR AA* 50 

 

                          2015 8:50 AM        WBC 5.8 RBC 3.29 HGB 10.70 g/dLHCT 34.0 %.0 fLMCH 32.50

 pgMCHC 31.50 g/dLRDW SD 52 RDW CV 13.60 %MPV 9.60 fLPLT 223 NRBC# 0.00 NRBC% 
0.0 %NEUT 69.60 %%LYMP 18.90 %%MONO 8.50 %%EOS 2.80 %%BASO 0.20 %#NEUT 4.03 
#LYMP 1.09 #MONO 0.49 #EOS 0.16 #BASO 0.01 MANUAL DIFF NOT IND Lithium 0.620 
mmol/LGLUCOSE 83.0 mg/dLSODIUM 139.0 mmol/LPOTASSIUM 3.90 mmol/LCHLORIDE 109.0 
mmol/LCO2 22.0 mmol/LBUN 19.0 mg/dLCREATININE 1.40 mg/dLSGOT/AST 19.0 
IU/LSGPT/ALT 21.0 IU/LALK PHOS 55.0 IU/LTOTAL PROTEIN 6.50 g/dLALBUMIN 3.90 
g/dLTOTAL BILI 0.50 mg/dLCALCIUM 9.60 mg/dLAGE 65 GFR NonAA 38 GFR AA 46 eGFR 38
 eGFR AA* 46 TRIGLYCERIDES 121.0 mg/dLCHOLESTEROL 192.0 mg/dLHDL 51.0 mg/dLTOT 
CHOL/HDL 3.8 .0 mg/dLFREE T4 0.79 TSH 1.210 uIU/mLHemoglobin A1c 5.40 
%Estim. Avg Glu (eAG) 108 

 

                          3/15/2016 8:08 AM         VITAMIN B12 696.0 pg/mL

 

                          3/23/2016 8:26 AM         WBC 7.0 RBC 3.61 HGB 11.80 g/dLHCT 37.70 %.0 fLMCH 32.70

 pgMCHC 31.30 g/dLRDW SD 49 RDW CV 12.50 %MPV 10.0 fLPLT 207 NRBC# 0.00 NRBC% 
0.0 %NEUT 73.60 %%LYMP 16.40 %%MONO 6.60 %%EOS 3.0 %%BASO 0.30 %#NEUT 5.15 #LYMP
 1.15 #MONO 0.46 #EOS 0.21 #BASO 0.02 MANUAL DIFF NOT IND Lithium 0.940 
mmol/LGLUCOSE 108.0 mg/dLSODIUM 143.0 mmol/LPOTASSIUM 4.30 mmol/LCHLORIDE 110.0 
mmol/LCO2 27.0 mmol/LBUN 16.0 mg/dLCREATININE 1.60 mg/dLSGOT/AST 13.0 
IU/LSGPT/ALT 7.0 IU/LALK PHOS 71.0 IU/LTOTAL PROTEIN 6.80 g/dLALBUMIN 4.0 
g/dLTOTAL BILI 0.20 mg/dLCALCIUM 10.40 mg/dLAGE 65 GFR NonAA 32 GFR AA 39 eGFR 
32 eGFR AA* 39 TRIGLYCERIDES 113.0 mg/dLCHOLESTEROL 169.0 mg/dLHDL 42.0 mg/dLTOT
 CHOL/HDL 4.0 LDL (CALC) 104.0 mg/dLFREE T4 0.86 TSH 2.20 uIU/mLHemoglobin A1c 
5.20 %Estim. Avg Glu (eAG) 103 

 

                          3/25/2016 9:17 AM         COLOR YELLOW APPEARANCE CLEAR SPEC GRAV 1.010 pH 7.0 PROTEIN 

NEGATIVE GLUCOSE NEGATIVE mg/dLKETONE NEGATIVE BILIRUBIN NEGATIVE BLOOD NEGATIVE
 NITRITE NEGATIVE [place] SCREEN SMALL MICRO IND? SEE BELOW WBC/HPF 0-5 RBC/HPF 
NEGATIVE CASTS/LPF NEGATIVE /LPFCRYSTALS NEGATIVE MUCOUS THRDS NEGATIVE BACTERIA
 NEGATIVE EPITH CELLS FEW SQUAMOUS /HPFTRICHOMONAS NEGATIVE YEAST NEGATIVE 

 

                          2016 6:00 AM        GLUCOSE 91.0 mg/dLSODIUM 143.0 mmol/LPOTASSIUM 3.60 mmol/LCHLORIDE

 112.0 mmol/LCO2 23.0 mmol/LBUN 22.0 mg/dLCREATININE 1.20 mg/dLSGOT/AST 15.0 
IU/LSGPT/ALT 12.0 IU/LALK PHOS 61.0 IU/LTOTAL PROTEIN 5.40 g/dLALBUMIN 3.10 
g/dLTOTAL BILI 0.40 mg/dLCALCIUM 8.40 mg/dLAGE 66 GFR NonAA 45 GFR AA 55 eGFR 45
 eGFR AA* 55 WBC 3.0 RBC 3.05 HGB 9.80 g/dLHCT 32.10 %.0 fLMCH 32.10 
pgMCHC 30.50 g/dLRDW SD 54 RDW CV 14.20 %MPV 10.10 fLPLT 170 NRBC# 0.00 NRBC% 
0.0 %NEUT 50.70 %%LYMP 32.60 %%MONO 10.50 %%EOS 5.90 %%BASO 0.30 %#NEUT 1.54 
#LYMP 0.99 #MONO 0.32 #EOS 0.18 #BASO 0.01 MANUAL DIFF NOT IND 

 

                          2016 2:09 PM        COLOR YELLOW APPEARANCE CLEAR SPEC GRAV 1.010 pH 5.0 PROTEIN

 30 GLUCOSE NEGATIVE mg/dLKETONE NEGATIVE BILIRUBIN NEGATIVE BLOOD LARGE NITRITE
 NEGATIVE [place] SCREEN MODERATE MICRO INDICATED? SEE BELOW WBC/HPF  RBC/HPF
 20-50 CASTS/LPF NEGATIVE /LPFCRYSTALS NEGATIVE MUCOUS THRDS NEGATIVE BACTERIA 
NEGATIVE EPITH CELLS FEW SQUAMOUS /HPFTRICHOMONAS NEGATIVE YEAST NEGATIVE CULT 
SET UP? YES 

 

                          1/3/2017 4:08 PM          COLOR YELLOW APPEARANCE HAZY SPEC GRAV 1.015 pH 6.0 PROTEIN 30

 GLUCOSE NEGATIVE mg/dLKETONE NEGATIVE BILIRUBIN NEGATIVE BLOOD MODERATE NITRITE
 NEGATIVE [place] SCREEN LARGE MICRO INDICATED? SEE BELOW WBC/-200 RBC/HPF 
5-10 CASTS/LPF NEGATIVE /LPFCRYSTALS NEGATIVE MUCOUS THRDS NEGATIVE BACTERIA 
NEGATIVE EPITH CELLS 1+ SQUAMOUS /HPFTRICHOMONAS NEGATIVE YEAST NEGATIVE CULT 
SET UP? YES 

 

                          2017 4:24 PM         CREATININE 1.50 mg/dLAGE 66 GFR NonAA 35 GFR AA 42 eGFR 35 eGFR

 AA* 42 VITAMIN B12 1324.0 pg/mL

 

                          2017 11:30 AM         GLUCOSE 93.0 mg/dLSODIUM 143.0 mmol/LPOTASSIUM 3.10 mmol/LCHLORIDE

 101.0 mmol/LCO2 29.0 mmol/LBUN 26.0 mg/dLCREATININE 1.50 mg/dLSGOT/AST 23.0 
IU/LSGPT/ALT 13.0 IU/LALK PHOS 66.0 IU/LTOTAL PROTEIN 7.70 g/dLALBUMIN 4.30 
g/dLTOTAL BILI 0.40 mg/dLCALCIUM 10.30 mg/dLAGE 66 GFR NonAA 35 GFR AA 42 eGFR 
35 eGFR AA* 42 TRIGLYCERIDES 147.0 mg/dLCHOLESTEROL 184.0 mg/dLHDL 44.0 mg/dLTOT
 CHOL/HDL 4.2 .0 mg/dLWBC 5.4 RBC 3.98 HGB 12.90 g/dLHCT 40.20 %.0
 fLMCH 32.40 pgMCHC 32.10 g/dLRDW SD 50 RDW CV 13.50 %MPV 9.30 fLPLT 210 NRBC# 
0.00 NRBC% 0.0 %NEUT 54.20 %%LYMP 30.70 %%MONO 9.10 %%EOS 5.20 %%BASO 0.40 
%#NEUT 2.94 #LYMP 1.66 #MONO 0.49 #EOS 0.28 #BASO 0.02 MANUAL DIFF NOT IND FREE 
T4 1.09 COLOR YELLOW APPEARANCE CLEAR SPEC GRAV <=1.005 pH 5.5 PROTEIN NEGATIVE 
GLUCOSE NEGATIVE mg/dLKETONE NEGATIVE BILIRUBIN NEGATIVE BLOOD NEGATIVE NITRITE 
NEGATIVE [place]UK SCREEN LARGE MICRO INDICATED? SEE BELOW WBC/HPF 10-20 RBC/HPF 0-5 
CASTS/LPF NEGATIVE /LPFCRYSTALS NEGATIVE MUCOUS THRDS NEGATIVE BACTERIA FEW 
EPITH CELLS 1+ SQUAMOUS /HPFTRICHOMONAS NEGATIVE YEAST NEGATIVE CULT SET UP? YES
 TSH 1.820 uIU/mL







History Of Immunizations







       Name    Date Admin    Mfg Name    Mfg Code    Trade Name    Lot#    Route    Inj    Vis Given    Vis

 Pub                                    CVX

 

        Influenza    2008    Not Entered    NE      Not Entered            Not Entered    Not Entered

                    1            999

 

        X       12/19/2008    [employer]    MSD     Pneumovax 23            Intramuscular    Not Entered

                    1            999

 

           Influenza    10/15/2010    [employer]    NOV        Fluvirin > 12 Years    

[id number]     Intramuscular    Left Deltoid    10/15/2010    2009    999

 

          X         3/23/2015    [facility]    [facility]       Prevnar 13    [id number]    Intramuscular

                Right Gluteous Medius    3/23/2015       2013       109







History of Past Illness







                    Name                Date of Onset       Comments

 

                    Peritoneal Neoplasm, Malignant                         

 

                    Hyperlipidemia                           

 

                    Bipolar disorder, unspecified                         

 

                    Artificial opening status; colostomy                         

 

                    B12 deficiency                           

 

                    Anemia, Pernicious                         

 

                    Arthritis unspecified                         

 

                    cervical cancer                          

 

                    Artificial opening status; colostomy    2010  1:10PM     

 

                    Bipolar disorder, unspecified    2010  1:10PM     

 

                    Hyperlipidemia      2010  1:10PM     

 

                    Anemia, Pernicious    2010  1:10PM     

 

                    Postoperative Follow-Up    2010  1:55PM     

 

                    Postoperative Follow-Up    Mar  8 2010 10:57AM     

 

                    Artificial opening status; colostomy    Mar  8 2010  1:19PM     

 

                    Peritoneal Neoplasm, Malignant    Mar  8 2010  1:19PM     

 

                    Artificial opening status; colostomy    2010  1:40PM     

 

                    Hyperlipidemia      2010  1:40PM     

 

                    Anemia, Pernicious    2010  1:40PM     

 

                    Peritoneal Neoplasm, Malignant    2010  1:40PM     

 

                    Arthritis unspecified    2010  1:40PM     

 

                    Anemia of Chronic Illness    2010  1:40PM     

 

                    Tinea corporis      2010  3:17PM     

 

                    Bipolar disorder, unspecified    2010  1:33PM     

 

                    Hyperlipidemia      2010  1:33PM     

 

                    Anemia, Pernicious    2010  1:33PM     

 

                    Peritoneal Neoplasm, Malignant    2010  1:33PM     

 

                    B12 deficiency      2010  1:33PM     

 

                    Ethmoidal Sinusitis, Acute    Sep 21 2010  3:53PM     

 

                    Wheezing            Sep 21 2010  3:53PM     

 

                    Flu                 Oct 15 2010  1:40PM     

 

                    Bipolar disorder, unspecified    Oct 15 2010  1:42PM     

 

                    Hyperlipidemia      Oct 15 2010  1:42PM     

 

                    Anemia, Pernicious    Oct 15 2010  1:42PM     

 

                    Peritoneal Neoplasm, Malignant    Oct 15 2010  1:42PM     

 

                    Bipolar disorder, unspecified    2011 12:01PM     

 

                    Hyperlipidemia      2011 12:01PM     

 

                    Anemia, Pernicious    2011 12:01PM     

 

                    Peritoneal Neoplasm, Malignant    2011 12:01PM     

 

                    Bipolar disorder, unspecified    Apr 15 2011 10:55AM     

 

                    Major Depression    2011 10:11AM     

 

                    Bipolar Disorder    2011 10:11AM     

 

                    Cancer              May 10 2011  4:16PM     

 

                    Major Depression    May 10 2011  3:16PM     

 

                    Bipolar Disorder    May 10 2011  3:16PM     

 

                    Hypercalcemia       May 23 2011  2:47PM     

 

                    Bipolar disorder, unspecified    May 23 2011  2:47PM     

 

                    Colon Cancer, Personal History    May 23 2011  2:47PM     

 

                    Bipolar Disorder    May 31 2011  4:39PM     

 

                    Depressive Disorder    2011 10:01AM     

 

                    Vitamin B12 deficiency    2011 10:01AM     

 

                    Vitamin D Deficiency    2011  5:07PM     

 

                    Anemia, Vitamin B12 Deficiency    2011  5:07PM     

 

                    B12 deficiency      2011  3:56PM     

 

                    Routine gynecological examination    Aug  4 2011  9:08AM     

 

                    Screening Examination for Breast Cancer    Aug  4 2011  9:08AM     

 

                    Tinea Corporis      Aug  4 2011  9:08AM     

 

                    Depressive Disorder    Sep 23 2011  8:47AM     

 

                    Contact Dermatitis    Sep 23 2011  8:47AM     

 

                    Anemia, Pernicious    Sep 23 2011  8:47AM     

 

                    B12 deficiency      Sep 23 2011  8:47AM     

 

                    B12 deficiency      Sep 27 2011  2:58PM     

 

                    B12 deficiency      Oct 20 2011  2:34PM     

 

                    Flu                 Dec  9 2011  3:16PM     

 

                    B12 deficiency      Dec  9 2011  3:17PM     

 

                    B12 deficiency      2012  4:52PM     

 

                    B12 deficiency      Feb 2012 11:10AM     

 

                    B12 deficiency      2012  3:37PM     

 

                    B12 deficiency      May  3 2012  4:10PM     

 

                    B12 deficiency      2012  2:54PM     

 

                    B12 deficiency      2012 11:23AM     

 

                    B12 deficiency      Aug  9 2012  2:08PM     

 

                    B12 deficiency      Sep  6 2012  4:36PM     

 

                    B12 deficiency      Oct 16 2012 10:23AM     

 

                    Flu                 Feb  2013  3:11PM     

 

                    Bipolar disorder, unspecified    Feb  2013  2:48PM     

 

                    Anemia, Pernicious    Feb  2013  2:48PM     

 

                    B12 deficiency      Feb  4   2:48PM     

 

                    Extrapyramidal abnormal movement disorder    Feb  4   2:48PM     

 

                    B12 deficiency      Apr  3 2013 12:03PM     

 

                    Bipolar disorder, unspecified    May  7 2013  1:31PM     

 

                    Anemia, Pernicious    May  7 2013  1:31PM     

 

                    B12 deficiency      May  7 2013  1:31PM     

 

                    Extrapyramidal abnormal movement disorder    May  7 2013  1:31PM     

 

                    B12 deficiency      2013  3:42PM     

 

                    B12 deficiency      2013  1:31PM     

 

                    Hyperlipidemia      Aug  7 2013 10:37AM     

 

                    Vitamin D Deficiency    Aug  7 2013 10:37AM     

 

                    Bipolar disorder, unspecified    Aug  7 2013 10:37AM     

 

                    Anemia, Pernicious    Aug  7 2013 10:37AM     

 

                    B12 deficiency      Aug  7 2013 10:37AM     

 

                    B12 deficiency      Sep 25 2013 11:15AM     

 

                    B12 deficiency      Dec 11 2013  3:16PM     

 

                    B12 deficiency      Mar  6 2014  1:48PM     

 

                    B12 deficiency      May 21 2014  3:17PM     

 

                    Screening Examination for Breast Cancer    2014  3:23PM     

 

                    Periumbilical abdominal pain    2014  3:23PM     

 

                    B12 deficiency      Jul 10 2014  2:52PM     

 

                    Anemia, Vitamin B12 Deficiency    Aug 13 2014  4:50PM     

 

                    Bipolar disorder    Oct 16 2014 11:13AM     

 

                    Hyperlipidemia      Oct 16 2014 11:13AM     

 

                    Anemia, Pernicious    Oct 16 2014 11:13AM     

 

                    Peritoneal Neoplasm, Malignant    Oct 16 2014 11:13AM     

 

                    Screening breast examination    Oct 16 2014 11:13AM     

 

                    Weight loss         Oct 16 2014 11:13AM     

 

                    Anemia, Pernicious    Mar 23 2015  2:57PM     

 

                    B12 deficiency      Mar 23 2015  2:57PM     

 

                    Need for Prevnar vaccine    Mar 23 2015  2:57PM     

 

                    Bipolar disorder    Mar 23 2015  2:57PM     

 

                    Hyperlipidemia      Mar 23 2015  2:57PM     

 

                    Anemia, Pernicious    Mar 23 2015  2:57PM     

 

                    Peritoneal Neoplasm, Malignant    Mar 23 2015  2:57PM     

 

                    B12 deficiency      May  4 2015  4:48PM     

 

                    Hyperlipidemia      May 13 2015  9:56AM     

 

                    Anemia              May 13 2015  9:56AM     

 

                    Bipolar disorder    May 13 2015  9:56AM     

 

                    Bipolar disorder    May 14 2015  3:27PM     

 

                    Hyperlipidemia      May 14 2015  3:27PM     

 

                    Anemia, Pernicious    May 14 2015  3:27PM     

 

                    Peritoneal Neoplasm, Malignant    May 14 2015  3:27PM     

 

                    B12 deficiency      2015  2:20PM     

 

                    B12 deficiency      2015 11:34AM     

 

                    B12 deficiency      Aug 18 2015  9:06AM     

 

                    Tinea Corporis      Sep 18 2015  8:54AM     

 

                    B12 deficiency      Sep 18 2015  8:54AM     

 

                    B12 deficiency      2015 10:28AM     

 

                    Herpes zoster without complication    Dec  3 2015  9:52AM     

 

                    B12 deficiency      Dec 23 2015 11:21AM     

 

                    B12 deficiency      2016  4:51PM     

 

                    Vitamin B 12 deficiency    Mar 14 2016  5:35PM     

 

                    B12 deficiency      Mar 15 2016 12:14PM     

 

                    B12 deficiency      May  5 2016 11:30AM     

 

                    Edema               May  5 2016 11:30AM     

 

                    Dermatitis          May  5 2016 11:30AM     

 

                    Edema               May 17 2016  8:38AM     

 

                    Shortness of breath    May 17 2016  8:38AM     

 

                    Bilateral edema of lower extremity    2016  2:06PM     

 

                    B12 deficiency      2016  2:06PM     

 

                    B12 deficiency      2016 11:50AM     

 

                    B12 deficiency      2016 11:20AM     

 

                    Diarrhea            Aug  2 2016  3:13PM     

 

                    B12 deficiency      Aug 24 2016 11:10AM     

 

                    Encounter for screening mammogram for breast cancer    Aug 24 2016 11:44AM     

 

                    B12 deficiency      Sep 28 2016  2:35PM     

 

                    B12 deficiency      Dec 15 2016  2:02PM     

 

                    Dysuria             Dec 29 2016 12:14PM     

 

                    Hematuria           Fidencio  3 2017  1:33PM     

 

                    Constipation by delayed colonic transit    2017  1:52PM     

 

                    Ileus               2017  1:52PM     

 

                    UTI (urinary tract infection)    Fidencio 15 2017  3:39PM     

 

                    Acute cystitis with hematuria    2017 11:07AM     

 

                    B12 deficiency      2017 11:07AM     

 

                    B12 deficiency      2017 11:40AM     

 

                    B12 deficiency      2017  4:07PM     

 

                    Slurred speech      2017  3:07PM     

 

                    Vitamin B12 deficiency    2017  3:07PM     

 

                    Dysphagia, unspecified type    2017  3:07PM     

 

                    Hyperlipidemia      2017  3:07PM     

 

                    Dysuria             2017 12:01PM     







Payers







           Insurance Name    Company Name    Plan Name    Plan Number    Policy Number    Policy Group

 Number                                 Start Date

 

                    Medicare RHC Medicare RHC              [id number]              N/A

 

                          [employer]    [employer]                 [id number]

                                                    

 

                    Medicare Part A    Medicare - Lab/Xray              [id number]              2006

 

                    Medicare Part B    Medicare [place]              [id number]              2006

 

                          [employer]                 50 percent

                                                    2009

 

                    Human    [employer]              [id number]              N/A

 

                    Medicare Part A    Medicare Part A              [id number]              N/A

 

                    Medicare Part A    Medicare Part A              [id number]              2006









History of Encounters







                    Visit Date          Visit Type          Provider

 

                    2017           Office visit         

 

                    2017           Office visit        Bhupinder Louise DO

 

                    2017           Nurse visit         Bhupinder Louise DO

 

                    2017           Office visit        Radha Ontiveros APRN

 

                    1/15/2017           Office visit        Aj Tapia NP

 

                    2017            Office visit        Devin Masterson MD

 

                    2016          [facility]            Devin Masterson MD

 

                    12/15/2016          Nurse visit         Bhupinder Louise DO

 

                    2016           Nurse visit         Bret Forte APRN

 

                    2016           Nurse visit         Bhupinder Louise DO

 

                    2016            Office visit        Bhupinder Louise DO

 

                    2016           Nurse visit         Bhupinder Aspen DO

 

                    2016           Office visit        Bret Forte APRN

 

                    2016            Office visit        Bhupinder Aspen DO

 

                    3/15/2016           Nurse visit         Bhupinder Aspen DO

 

                    2016            Nurse visit         Bhupinder Aspen DO

 

                    2015          Nurse visit         Bhupinder Aspen DO

 

                    12/3/2015           Office visit        Bhupinder Aspen DO

 

                    2015          Nurse visit         Bhupinder Aspen DO

 

                    2015           Office visit        Bhupinder Aspen DO

 

                    2015           Nurse visit         Bhupinder Aspen DO

 

                    2015            Nurse visit         Bhupinder Aspen DO

 

                    2015            Nurse visit         Bhupinder Aspen DO

 

                    2015           Office visit        Bhupinder Aspen DO

 

                    2015            Nurse visit         Bhupinder Aspen DO

 

                    3/23/2015           Office visit        Bhupinder Aspen DO

 

                    10/16/2014          Office visit        Bhupinder Aspen DO

 

                    2014           Nurse visit         Radha Ontiveros APRN

 

                    7/10/2014           Nurse visit         Bhupinder Aspen DO

 

                    2014           Office visit        Bhupinder Aspen DO

 

                    2014           Nurse visit         Bhupinder Aspen DO

 

                    3/6/2014            Nurse visit         Bhupinder Aspen DO

 

                    2014            Yasmine Lopez MD

 

                    2013          Nurse visit         Bhupinder Aspen DO

 

                    2013           Nurse visit         Bhupinder Aspen DO

 

                    2013            Office visit        Bhupinder Aspen DO

 

                    2013            Nurse visit         Bhupinder Aspen DO

 

                    2013            Nurse visit         Bhupinder Aspen DO

 

                    2013            Office visit        Bhupinder Aspen DO

 

                    4/3/2013            Nurse visit         Bhupinder Aspen DO

 

                    2013            Office visit        Bhupinder Aspen DO

 

                    10/16/2012          Nurse visit         Bhupinder Aspen DO

 

                    2012            Nurse visit         Bhupinder Aspen DO

 

                    2012            Voided              Bhupinder Aspen DO

 

                    2012            Nurse visit         Bhupinder Aspen DO

 

                    2012            Nurse visit         Bhupinder Aspen DO

 

                    2012           Nurse visit         Bhupinder Aspen DO

 

                    5/3/2012            Nurse visit         Bhupinder Aspen DO

 

                    2012           Nurse visit         Bhupinder Aspen DO

 

                    2012           Nurse visit         Bhupinder Aspen DO

 

                    2012           Nurse visit         Bhupinder Aspen DO

 

                    2011           Nurse visit         Bhupinder Aspen DO

 

                    10/20/2011          Nurse visit         Bhupinder Aspen DO

 

                    2011           Office visit        Bhupinder Aspen DO

 

                    2011           Nurse visit         Radha Ontiveros NORMA

 

                    2011            Office visit        Bhupinder Aspen DO

 

                    2011           Nurse visit         Bhupinder Aspen DO

 

                    2011            Office visit        Bhupinder Aspen DO

 

                    2011           Office visit        Bhupinder Aspen DO

 

                    5/10/2011           Office visit        Bhupinder Aspen DO

 

                    2011           Office visit        Bhupinder Louise DO

 

                    4/15/2011           Office visit        Devin Angel DO

 

                    2011           Office visit        Devin Angel DO

 

                    10/15/2010          Office visit        Devin Angel DO

 

                    2010           Office visit        Devin Angel DO

 

                    2010            Office visit        Devin Angel DO

 

                    2010           Office visit        Devin Angel DO

 

                    2010            Office visit        Devin Angel DO

 

                    3/8/2010            Office visit        Devin Masterson MD

 

                    2010            Surgery             Devin Masterson MD

 

                    2010            Office visit        Devin Angel DO

 

                    2010           Surgery             Devin Masterson MD

 

                    2010           Hospital            Devin Masterson MD

 

                    2010           Hospital            Devin Masterson MD

 

                    10/22/2009          Office visit        Devin Angel DO

## 2019-06-26 NOTE — ANESTHESIA-GENERAL POST-OP
MAC


Patient Condition


Mental Status/LOC:  Same as Preop


Cardiovascular:  Satisfactory


Nausea/Vomiting:  Absent


Respiratory:  Satisfactory


Pain:  Controlled


Complications:  Absent





Post Op Complications


Complications


None





Follow Up Care/Instructions


Patient Instructions


None needed.





Anesthesiology Discharge Order


Discharge Order


Patient is doing well, no complaints, stable vital signs, no apparent adverse 

anesthesia problems.   


No complications reported per nursing.











ACACIA BOWERS CRNA            Jun 26, 2019 10:55

## 2019-06-26 NOTE — XMS REPORT
MU2 Ambulatory Summary

                             Created on: 2016



Pauline Gan

External Reference #: [id number]

: 1950

Sex: Female



Demographics







                          Address                   [address]

[place]  [id number]

 

                          Home Phone                [phone]

 

                          Preferred Language        English

 

                          Marital Status            Legally 

 

                          Rastafari Affiliation     Unknown

 

                          Race                      White

 

                          Ethnic Group              Not  or 





Author







                          Bhupinder Aguilar

 

                          [facility]

 

                          Address                   [address]

[place]  [id number]



 

                          Phone                     [phone]







Care Team Providers







                    Care Team Member Name    Role                Phone

 

                    Bhupinder Louise    PCP                 Unavailable

 

                    Bhupinder Louise    PreferredProvider    Unavailable







Allergies and Adverse Reactions







                    Name                Reaction            Notes

 

                    NO KNOWN DRUG ALLERGIES                         







Plan of Treatment







             Planned Activity    Comments     Planned Date    Planned Time    Plan/Goal

 

             Injection, Subcutaneous/IM                 2016    12:00 AM      

 

             Injection, Subcutaneous/IM                 2016    12:00 AM      

 

             Mammography; bilateral                 2016    12:00 AM      

 

             Injection, Subcutaneous/IM                 2016    12:00 AM      

 

             URINALYSIS ROUTINE C&S IF IND                 2016    12:00 AM      

 

             CBC with Auto                 2013     12:00 AM      

 

             Lithium                   2013     12:00 AM      

 

             Basic metabolic panel                 2013     12:00 AM      

 

             Vitamin B12 (cyanocobalamin)                 2013     12:00 AM      

 

             Folic acid, serum                 2013     12:00 AM      

 

             Injection,Subcutaneous/Intramuscul                 2013    12:00 AM      







Medications







                                        Active 

 

             Name         Start Date    Estimated Completion Date    SIG          Comments

 

                Latuda 20 mg oral tablet                                    take 1 tablet (20 mg) by oral route once daily with

 food (at least 350 calories)            

 

             pravastatin 40 mg oral tablet    3/30/2015                 TAKE 1 TABLET BY MOUTH DAILY     

 

                Namenda XR 28 mg oral capsule,sprinkle,ER 24hr    2015                       take 1 capsule (28

 mg) by oral route once daily            

 

                Namenda XR 28 mg oral capsule,sprinkle,ER 24hr    2016                       take 1 capsule (28

 mg) by oral route once daily            

 

                triamcinolone acetonide 0.1 % topical cream    2016                        apply a thin layer to 

the affected area(s) by topical route 2 times per day     

 

                potassium chloride 10 mEq oral tablet extended release    2016                       take 1 tablet

 (10 meq) by oral route once daily       

 

             pravastatin 40 mg oral tablet    2016                 TAKE 1 TABLET BY MOUTH DAILY     

 

                Vitamin B-12 1,000 mcg/mL injection solution    2016                       inject 1 milliliter 

(1,000 mcg) by intramuscular route once a month     

 

                potassium chloride 10 mEq oral tablet extended release    2016                      take 1 tablet

 (10 meq) by oral route once daily       

 

                Namenda XR 28 mg oral capsule,sprinkle,ER 24hr    2016                      TAKE 1 CAPSULE BY

 MOUTH EVERY DAY                         

 

                furosemide 40 mg oral tablet    2016                      take 1 tablet (40 mg) by oral route

 once daily                              









                                         

 

             Name         Start Date    Expiration Date    SIG          Comments

 

             Reglan 10mg    3/29/2010    2010    one ac and hs     

 

                Keflex 500 mg oral capsule    2010       10/1/2010       take 1 capsule (500 mg) by oral

 route every 6 hours for 10 days         

 

                Bactrim -160 mg oral tablet    2011       take 1 tablet by oral route

 every 12 hours for 7 days               

 

                triamcinolone acetonide 0.1 % topical cream    2011      apply a thin

 layer to the affected area(s) by topical route 2 times per day     

 

                sertraline 100 mg oral tablet    4/10/2012       5/10/2012       take 1.5 tablets by oral route

 daily for 30 days                       

 

                ergocalciferol (vitamin D2) 50,000 unit oral capsule    4/15/2013       2013       TAKE

 ONE CAPSULE BY MOUTH ONCE A WEEK        

 

                CYANOCOBALAM 1000MCGINJ 1000 milliliter    2013       INJECT 1ML INTRAMUSCULAR

 ONCE A MONTH                            

 

                pravastatin 40 mg oral tablet    3/25/2014       3/20/2015       TAKE ONE TABLET BY MOUTH EVERY

 DAY                                     

 

                          Zostavax (PF) 19,400 unit/0.65 mL subcutaneous suspension for reconstitution    3/23/2015

                    3/24/2015           inject 0.65 milliliter by subcutaneous route once     

 

                famciclovir 500 mg oral tablet    12/3/2015       12/10/2015      take 1 tablet (500 mg) by

 oral route every 8 hours for 7 days     

 

                furosemide 40 mg oral tablet    2016      take 1 tablet (40 mg) by oral

 route once daily                        

 

                Cipro 500 mg oral tablet    2016       take 1 tablet (500 mg) by oral route

 2 times per day for 5 days              









                                        Discontinued 

 

             Name         Start Date    Discontinued Date    SIG          Comments

 

                Tylenol 325 mg oral tablet                    2013        take 1 - 2 tablets (325 -650 mg) by oral

 route every 4-6 hours as needed         

 

                Calcium 600 + D(3) 600 mg(1,500mg) -400 unit oral tablet                    2011       take 1 tablet

 by oral route 2 times a day            no longer taking

 

                Vitamin B-12 1,000 mcg oral tablet extended release    2010       take 1

 tablet by oral route daily             no longer taking

 

                Antifungal (clotrimazole) 1 % topical cream    2010       apply to the 

affected and surrounding areas of skin by topical route 2 times per day morning 
and evening                              

 

                sertraline 100 mg oral tablet    5/10/2011       2011       take 2 tablets (200 mg) by 

oral route once daily                   discontinued by Dr. Serrano

 

                mirtazapine 15 mg oral tablet                    2011        take 1 tablet (15 mg) by oral route 

once daily before bedtime               Dr. Serrano

 

                mirtazapine 15 mg oral tablet                    2011        take 1 tablet (15 mg) by oral route 

once daily before bedtime               dc'd by Dr. Serrano

 

                Pristiq 50 mg oral tablet extended release 24 hr                    2013        take 1 tablet (50

 mg) by oral route once daily           Dr. Serrano

 

                Pristiq 50 mg oral tablet extended release 24 hr                    2013        take 1 tablet (50

 mg) by oral route once daily           dose updated

 

                Vitamin B-12 1,000 mcg/mL injection solution    2011        inject 1 milliliter

 (1,000 mcg) by intramuscular route once a month    on list already

 

                    syringe with needle 1 mL 25 gauge x 1" miscellaneous syringe    2011

                          use for injection once a month     

 

                clotrimazole 1 % topical cream    2011        apply to the affected and surrounding

 areas of skin by topical route 2 times per day in the morning and evening     

 

                Vitamin D2 50,000 unit oral capsule    2011        take 1 capsule (50,000

 unit) by oral route once weekly        generic on list

 

                Pravachol 40 mg oral tablet    2012        take 1 tablet (40 mg) by oral 

route once daily for 90 days            generic on list

 

                lithium carbonate 300 mg oral capsule    2012        take 1 capsule by oral

 route daily                            dose updated

 

                Pristiq 100 mg oral tablet extended release 24 hr                    4/10/2012       take 1 and 1/2 

tablet (150 mg) by oral route once daily    Mental Health provider

 

                Pristiq 100 mg oral tablet extended release 24 hr                    4/10/2012       take 1 and 1/2 

tablet (150 mg) by oral route once daily    Discontinued by Dr Efrain [facility] at [facility]

 

                hydroxyzine HCl 50 mg oral tablet    10/16/2014      2015       take 1 tablet (50 mg) 

by oral route at bedtime                 

 

                lithium carbonate 300 mg oral capsule    2015       take 1 capsule (300

 mg) by oral route 2 for 30 days         

 

                fluconazole 100 mg oral tablet    2015       12/3/2015       take 1 tablet (100 mg) by 

oral route once a week                   

 

                ketoconazole 2 % topical cream    2015       12/3/2015       apply to the affected area(s)

 by topical route 2 times per day        

 

                prednisone 10 mg oral tablet    12/3/2015       2016        take 2 tablets (20 mg) by oral

 route once daily for 4 days 1 tablet daily for 4 days 0.5 tablet daily for 4 
days                                     







Problem List







                    Description         Status              Onset

 

                    Artificial opening status; colostomy    Active               

 

                    Bipolar disorder, unspecified    Active               

 

                    Hyperlipidemia      Active               

 

                    Peritoneal Neoplasm, Malignant    Active               

 

                    Anemia, Pernicious    Active               

 

                    Arthritis unspecified    Active               

 

                    B12 deficiency      Active               







Vital Signs







      Date    Time    BP-Sys(mm[Hg]    BP-Lynn(mm[Hg])    HR(bpm)    RR(rpm)    Temp    WT    HT    HC    BMI

                    BSA                 BMI Percentile      O2 Sat(%)

 

       2016    3:11:00 PM    134 mmHg    76 mmHg    80 bpm    20 rpm    98 F    163 lbs    69 in     

                24.07 kg/m2     1.90 m2                         98 %

 

        2016    2:04:00 PM    142 mmHg    86 mmHg    68 bpm    16 rpm    98.5 F    166 lbs    63 in

                          29.4053 kg/m    1.8295 m                 100 %

 

        2016    11:27:00 AM    148 mmHg    78 mmHg    90 bpm    20 rpm    98.2 F    153 lbs    69 in

                          22.59 kg/m2    1.84 m2                   96 %

 

        12/3/2015    9:50:00 AM    132 mmHg    70 mmHg    62 bpm    16 rpm    97.9 F    145 lbs    69 in

                          21.4125 kg/m    1.7894 m                 100 %

 

        2015    8:52:00 AM    132 mmHg    68 mmHg    52 bpm    20 rpm    97.8 F    141 lbs    69 in

                          20.82 kg/m2    1.76 m2                   100 %

 

        2015    3:25:00 PM    120 mmHg    62 mmHg    72 bpm    16 rpm    98.1 F    136 lbs    69 in

                          20.0835 kg/m    1.733 m                 98 %

 

       3/23/2015    2:55:00 PM    130 mmHg    76 mmHg    68 bpm    18 rpm    97 F    140 lbs    69 in    

                20.67 kg/m2     1.76 m2                         98 %

 

        10/16/2014    11:11:00 AM    120 mmHg    66 mmHg    77 bpm    20 rpm    98 F    130 lbs    69 in

                          19.1974 kg/m    1.6943 m                 100 %

 

        2014    3:21:00 PM    130 mmHg    66 mmHg    63 bpm    18 rpm    97.2 F    160 lbs    69 in

                          23.63 kg/m2    1.88 m2                   99 %

 

        2013    10:35:00 AM    132 mmHg    70 mmHg    66 bpm    20 rpm    98.1 F    157 lbs    69 in

                          23.1846 kg/m    1.862 m                  

 

        2013    1:29:00 PM    132 mmHg    70 mmHg    76 bpm    18 rpm    98.2 F    166 lbs    69 in 

                          24.51 kg/m2    1.91 m2                    

 

       2013    2:46:00 PM    128 mmHg    70 mmHg    76 bpm    16 rpm    98 F    160 lbs    69 in     

                23.6276 kg/m    1.8797 m                       

 

        2011    8:49:00 AM    128 mmHg    78 mmHg    70 bpm    18 rpm    97.9 F    164 lbs    69 in

                          24.22 kg/m2    1.90 m2                    

 

     2011    1:31:00 PM    132 mmHg    68 mmHg    84 bpm         97 F    167 lbs                        

                                         

 

        2011    9:09:00 AM    128 mmHg    70 mmHg    72 bpm    18 rpm    98.2 F    163 lbs    64 in 

                          27.98 kg/m2    1.83 m2                    

 

       2011    10:01:00 AM    132 mmHg    70 mmHg    72 bpm    18 rpm    98.2 F    154 lbs             

                                                                 

 

       2011    2:47:00 PM    128 mmHg    70 mmHg    72 bpm    18 rpm    97.8 F    156 lbs             

                                                                 

 

       5/10/2011    3:16:00 PM    144 mmHg    80 mmHg    72 bpm    18 rpm    98.2 F    158 lbs             

                                                                 

 

        2011    10:11:00 AM    132 mmHg    70 mmHg    70 bpm    18 rpm    98.2 F    168 lbs    69 in

                          24.81 kg/m2    1.93 m2                    

 

        4/15/2011    10:52:00 AM    110 mmHg    60 mmHg    75 bpm    16 rpm    97.5 F    172.375 lbs    

69 in                     25.4551 kg/m    1.951 m                 100 %

 

        2011    11:43:00 AM    120 mmHg    82 mmHg    75 bpm    16 rpm    97.2 F    178.5 lbs    69

 in                       26.36 kg/m2    1.99 m2                   100 %

 

        10/15/2010    1:32:00 PM    120 mmHg    70 mmHg    80 bpm    18 rpm    96.6 F    177 lbs    69 in

                          26.1381 kg/m    1.977 m                 100 %

 

        2010    3:50:00 PM    168 mmHg    100 mmHg    82 bpm    18 rpm    97.8 F    177.5 lbs    69

 in                       26.21 kg/m2    1.98 m2                   97 %

 

        2010    1:21:00 PM    140 mmHg    80 mmHg    59 bpm    16 rpm    97.6 F    173.25 lbs    69 

in                        25.5843 kg/m    1.956 m                 100 %

 

        2010    3:02:00 PM    140 mmHg    80 mmHg    61 bpm    16 rpm    97.6 F    173.125 lbs    69

 in                       25.57 kg/m2    1.96 m2                   99 %

 

        2010    1:23:00 PM    130 mmHg    80 mmHg    66 bpm    16 rpm    96.8 F    173 lbs    69 in 

                          25.5474 kg/m    1.9546 m                 100 %

 

        2010    12:58:00 PM    130 mmHg    88 mmHg    75 bpm    16 rpm    98.4 F    172.25 lbs    69

 in                       25.44 kg/m2    1.95 m2                   100 %







Social History







                    Name                Description         Comments

 

                    denies alcohol use                         

 

                    denies smoking                           

 

                    Denies illicit substance abuse                         

 

                    retired                                 direct care

 

                    Single                                   

 

                    Exercises regularly                         

 

                    Attended some college                         

 

                    Tobacco             Never smoker         







History of Procedures







                    Date Ordered        Description         Order Status

 

                    2010 12:00 AM    COMPREHEN METABOLIC PANEL    Reviewed

 

                    2010 12:00 AM    COMPLETE CBC W/AUTO DIFF WBC    Reviewed

 

                    2010 12:00 AM    LIPID PANEL         Reviewed

 

                          2015 12:00 AM        B12 Injection, Up to 1000 Mcg NDC#3411-0925-91 [facility] 

                                        Reviewed

 

                    2011 12:00 AM    MAMMOGRAM SCREENING    Reviewed

 

                    2011 12:00 AM    CYTOPATH C/V THIN LAYER    Reviewed

 

                    2011 12:00 AM    B12 Injection 1 cc NDC#[address]-85    Reviewed

 

                    2015 12:00 AM    THER/PROPH/DIAG INJ SC/IM    Reviewed

 

                    2015 12:00 AM    B12 Injection, Up to 1000 Mcg NDC#0471-8720-03    Reviewed

 

                    2011 12:00 AM    THER/PROPH/DIAG INJ SC/IM    Reviewed

 

                    2011 12:00 AM    B12 Injection([place]) Ndc#2681-7521-81-    Reviewed

 

                    2015 12:00 AM    THER/PROPH/DIAG INJ SC/IM    Reviewed

 

                    2015 12:00 AM    B12 Injection, Up to 1000 Mcg NDC#2248-9344-04    Reviewed

 

                    10/20/2011 12:00 AM    THER/PROPH/DIAG INJ SC/IM    Reviewed

 

                    10/20/2011 12:00 AM    B12 Injection([place]) Ndc#9664-5166-47-    Reviewed

 

                    2016 12:00 AM    THER/PROPH/DIAG INJ SC/IM    Reviewed

 

                    2016 12:00 AM    B12 Injection, Up to 1000 Mcg NDC#2346-1976-65    Reviewed

 

                    3/14/2016 12:00 AM    VITAMIN B-12        Reviewed

 

                    3/15/2016 12:00 AM    THER/PROPH/DIAG INJ SC/IM    Reviewed

 

                    3/15/2016 12:00 AM    B12 Injection, Up to 1000 Mcg NDC#0381-0414-57    Reviewed

 

                    2011 12:00 AM    ***Immunization administration, Medicare flu    Reviewed

 

                    2011 12:00 AM    Fluzone ** MEDICARE Only **    Reviewed

 

                    2011 12:00 AM    THER/PROPH/DIAG INJ SC/IM    Reviewed

 

                    2011 12:00 AM    B12 Injection ([facility]) Ndc#0366-4131-20    Reviewed

 

                    2016 12:00 AM    B12 Injection, Up to 1000 Mcg NDC#8545-5842-13 [facility]    

Reviewed

 

                    2016 12:00 AM    TTE W/DOPPLER COMPLETE    Reviewed

 

                    2016 12:00 AM    EXTREMITY STUDY     Returned

 

                          2016 12:00 AM        B12 Injection, Up to 1000 Mcg NDC#9011-7792-14 [facility] 

                                        Reviewed

 

                    2016 12:00 AM    THER/PROPH/DIAG INJ SC/IM    Reviewed

 

                    2012 12:00 AM    THER/PROPH/DIAG INJ SC/IM    Reviewed

 

                    2012 12:00 AM    B12 Injection ([employer]) Ndc#2646-0271-76    Reviewed

 

                    2016 12:00 AM    THER/PROPH/DIAG INJ SC/IM    Reviewed

 

                    2016 12:00 AM    B12 Injection, Up to 1000 Mcg NDC#1319-2140-36    Reviewed

 

                    2012 12:00 AM    THER/PROPH/DIAG INJ SC/IM    Reviewed

 

                    2012 12:00 AM    B12 Injection([place]) Ndc#7853-3029-31-    Reviewed

 

                    12/15/2016 12:00 AM    B12 Injection, Up to 1000 Mcg NDC#0364-8160-73    Reviewed

 

                    12/15/2016 12:00 AM    THER/PROPH/DIAG INJ SC/IM    Reviewed

 

                    5/3/2012 12:00 AM    THER/PROPH/DIAG INJ SC/IM    Reviewed

 

                    5/3/2012 12:00 AM    B12 Injection([place]) Ndc#2872-9968-17-    Reviewed

 

                    2012 12:00 AM    IMMUNOTHERAPY INJECTIONS    Reviewed

 

                    2012 12:00 AM    B12 Injection([place]) Ndc#4587-0221-42-    Reviewed

 

                    2012 12:00 AM    THER/PROPH/DIAG INJ SC/IM    Reviewed

 

                    2012 12:00 AM    B12 Injection, Up to 1000 Mcg NDC#6340-2831-41    Reviewed

 

                    2012 12:00 AM    THER/PROPH/DIAG INJ SC/IM    Reviewed

 

                    2012 12:00 AM    B12 Injection, Up to 1000 Mcg NDC#2866-0148-30    Reviewed

 

                    2012 12:00 AM    THER/PROPH/DIAG INJ SC/IM    Reviewed

 

                    2012 12:00 AM    B12 Injection, Up to 1000 Mcg NDC#8383-6847-20    Reviewed

 

                    10/16/2012 12:00 AM    THER/PROPH/DIAG INJ SC/IM    Reviewed

 

                    10/16/2012 12:00 AM    B12 Injection, Up to 1000 Mcg NDC#7989-4227-38    Reviewed

 

                    2010 12:00 AM    COMPREHEN METABOLIC PANEL    Reviewed

 

                    2010 12:00 AM    COMPLETE CBC W/AUTO DIFF WBC    Reviewed

 

                    2010 12:00 AM    LIPID PANEL         Reviewed

 

                    2013 12:00 AM    Flu Injection 3 Years And Above NDC# [address]-93  [facility]    Reviewed



 

                    2013 12:00 AM    COMPLETE CBC W/AUTO DIFF WBC    Reviewed

 

                    2013 12:00 AM    ASSAY OF LITHIUM    Reviewed

 

                    2013 12:00 AM    METABOLIC PANEL TOTAL CA    Reviewed

 

                    4/3/2013 12:00 AM    THER/PROPH/DIAG INJ SC/IM    Reviewed

 

                    4/3/2013 12:00 AM    B12 Injection, Up to 1000 Mcg NDC#9408-7837-41    Reviewed

 

                    2013 12:00 AM    THER/PROPH/DIAG INJ SC/IM    Reviewed

 

                    2013 12:00 AM    B12 Injection, Up to 1000 Mcg NDC#0604-2303-64    Reviewed

 

                    2013 12:00 AM    THER/PROPH/DIAG INJ SC/IM    Reviewed

 

                    2013 12:00 AM    B12 Injection, Up to 1000 Mcg NDC#6773-4225-35    Reviewed

 

                    2013 12:00 AM    LIPID PANEL         Reviewed

 

                    2013 12:00 AM    VITAMIN D 25 HYDROXY    Reviewed

 

                    2013 12:00 AM    THER/PROPH/DIAG INJ SC/IM    Reviewed

 

                    3/6/2014 12:00 AM    THER/PROPH/DIAG INJ SC/IM    Reviewed

 

                    2014 12:00 AM    THER/PROPH/DIAG INJ SC/IM    Reviewed

 

                    2014 12:00 AM    B12 Injection, Up to 1000 Mcg NDC#1472-9632-28    Reviewed

 

                    2010 12:00 AM    SKIN FUNGI CULTURE    Reviewed

 

                    10/9/2010 12:00 AM    COMPREHEN METABOLIC PANEL    Reviewed

 

                    10/9/2010 12:00 AM    LIPID PANEL         Reviewed

 

                    2010 12:00 AM    THER/PROPH/DIAG INJ SC/IM    Reviewed

 

                    2010 12:00 AM    B12 Injection Ndc#[address]-03 ([place])    Reviewed

 

                    2010 12:00 AM    THER/PROPH/DIAG INJ SC/IM    Reviewed

 

                    2010 12:00 AM    Kenalog 40 Mg Im-Ndc#[address]-42 ([place])    Reviewed

 

                    10/15/2010 12:00 AM    FLU VACCINE 3 YRS & > IM    Reviewed

 

                    10/15/2010 12:00 AM    Admin.Of M/C Cov.Vaccine-Flu Vacc.    Reviewed

 

                    1/15/2011 12:00 AM    COMPLETE CBC W/AUTO DIFF WBC    Reviewed

 

                    1/15/2011 12:00 AM    COMPREHEN METABOLIC PANEL    Reviewed

 

                    1/15/2011 12:00 AM    LIPID PANEL         Reviewed

 

                    2014 12:00 AM    MAMMOGRAM SCREENING    Reviewed

 

                    2014 12:00 AM    Screening mammography, bilateral    Reviewed

 

                    7/10/2014 12:00 AM    THER/PROPH/DIAG INJ SC/IM    Reviewed

 

                    7/10/2014 12:00 AM    B12 Injection, Up to 1000 Mcg NDC#5815-8027-28    Reviewed

 

                    2011 12:00 AM    COMPLETE CBC W/AUTO DIFF WBC    Reviewed

 

                    2011 12:00 AM    COMPREHEN METABOLIC PANEL    Reviewed

 

                    2011 12:00 AM    LIPID PANEL         Reviewed

 

                    2014 12:00 AM    B12 Injection, Up to 1000 Mcg NDC#2920-9816-55    Reviewed

 

                    10/19/2014 12:00 AM    MAMMOGRAM SCREENING    Reviewed

 

                    10/19/2014 12:00 AM    Screening mammography, bilateral    Reviewed

 

                    10/16/2014 12:00 AM    COMPLETE CBC W/AUTO DIFF WBC    Reviewed

 

                    10/16/2014 12:00 AM    COMPREHEN METABOLIC PANEL    Reviewed

 

                    10/16/2014 12:00 AM    IMMUNOASSAY TUMOR     Reviewed

 

                    10/16/2014 12:00 AM    LIPID PANEL         Reviewed

 

                    10/16/2014 12:00 AM    ASSAY OF LITHIUM    Reviewed

 

                    10/16/2014 12:00 AM    MAMMOGRAM SCREENING    Reviewed

 

                    2011 12:00 AM    ASSAY OF PARATHORMONE    Reviewed

 

                    2011 12:00 AM    VITAMIN D 25 HYDROXY    Reviewed

 

                    2011 12:00 AM    ASSAY OF LITHIUM    Reviewed

 

                    2011 12:00 AM    METABOLIC PANEL TOTAL CA    Reviewed

 

                    2011 12:00 AM    CT HEAD/BRAIN W/O & W/DYE    Reviewed

 

                    3/23/2015 12:00 AM    PNEUMOCOCCAL VACC 13 GLENDY IM    Reviewed

 

                    3/23/2015 12:00 AM    Vitamin B12 injection    Reviewed

 

                    2011 12:00 AM    ASSAY OF LITHIUM    Reviewed

 

                    2011 12:00 AM    B12 Injection Ndc#[address]-66  [facility]    Reviewed

 

                    2015 12:00 AM    THER/PROPH/DIAG INJ SC/IM    Reviewed

 

                    2015 12:00 AM    B12 Injection, Up to 1000 Mcg NDC#0108-0398-81    Reviewed

 

                    2015 12:00 AM    COMPLETE CBC W/AUTO DIFF WBC    Reviewed

 

                    2015 12:00 AM    COMPREHEN METABOLIC PANEL    Reviewed

 

                    2015 12:00 AM    LIPID PANEL         Reviewed

 

                    2015 12:00 AM    ASSAY OF LITHIUM    Reviewed

 

                    2011 12:00 AM    VIT D 1 25-DIHYDROXY    Reviewed

 

                    2011 12:00 AM    VITAMIN B-12        Reviewed

 

                    2015 12:00 AM    B12 Injection, Up to 1000 Mcg NDC#8501-7272-91    Reviewed

 

                    2015 12:00 AM    THER/PROPH/DIAG INJ SC/IM    Reviewed

 

                    2015 12:00 AM    B12 Injection, Up to 1000 Mcg NDC#5626-4431-69    Reviewed

 

                    2011 12:00 AM    THER/PROPH/DIAG INJ SC/IM    Reviewed

 

                    2011 12:00 AM    B12 Injection ([employer] [facility]) Ndc#6872-1233-55    Reviewed

 

                    2015 12:00 AM    THER/PROPH/DIAG INJ SC/IM    Reviewed

 

                    2015 12:00 AM    B12 Injection, Up to 1000 Mcg NDC#8716-9128-19    Reviewed







Results Summary







                          Data and Description      Results

 

                          2004 12:00 AM        Colonoscopy-Women and Men over 50 Normal 

 

                          2008 12:00 AM         Pap Smear Declined 

 

                          10/7/2009 12:00 AM        Cholest Cry Stone Ql .0 %LDLc SerPl-mCnc 123.0 mg/dLHDLc

 SerPl-mCnc 34.0 mg/dLTrigl SerPl-mCnc 190.0 mg/dLGlucose SerPl-mCnc 78.0 mg/dL

 

                          2009 12:00 AM        Mammogram -Women over 40 Normal HIV1+2 Ab Ser Ql no risk 

 

                          2010 8:47 AM         Dexa Bone Scan Refused Aspirin reccommended Contraindication 



 

                          2010 8:48 AM         Depression Done 

 

                          2010 12:00 AM         Foot Exam-Diabetic Done 

 

                          2010 12:00 AM         Cholest Cry Stone Ql .0 %LDLc SerPl-mCnc 126.0 mg/dLGlucose

 SerPl-mCnc 102.0 mg/dL

 

                          2010 8:45 AM          TRIGLYCERIDES 122.0 mg/dLCHOLESTEROL 186.0 mg/dLHDL 36.0 mg/dLTOT

 CHOL/HDL 5.2 LDL (CALC) 126.0 mg/dLGLUCOSE 102.0 mg/dLSODIUM 143.0 
mmol/LPOTASSIUM 3.70 mmol/LCHLORIDE 111.0 mmol/LCO2 23.0 mmol/LBUN 10.0 
mg/dLCREATININE 0.80 mg/dLSGOT/AST 12.0 IU/LSGPT/ALT 11.0 IU/LALK PHOS 65.0 
IU/LTOTAL PROTEIN 7.20 g/dLALBUMIN 3.90 g/dLTOTAL BILI 0.50 mg/dLCALCIUM 10.20 
mg/dLAGE 59 GFR NonAA 73 GFR AA 88 eGFR >60 mL/min/1.73 m2eGFR AA* >60 WBC 5.7 
RBC 3.26 HGB 10.60 g/dLHCT 31.70 %MCV 97.0 fLMCH 32.50 pgMCHC 33.40 g/dLRDW SD 
47 RDW CV 13.30 %MPV 9.70 fLPLT 287 NRBC# 0.00 NRBC% 0.0 %NEUT 62.90 %%LYMP 
21.80 %%MONO 9.90 %%EOS 5.0 %%BASO 0.40 %#NEUT 3.56 #LYMP 1.23 #MONO 0.56 #EOS 
0.28 #BASO 0.02 MANUAL DIFF NOT IND 

 

                          2010 12:00 AM        Glucose SerPl-mCnc 96.0 mg/dLCholest Cry Stone Ql .0 %LDLc

 SerPl-mCnc 146.0 mg/dL

 

                          2010 8:26 AM         TRIGLYCERIDES 106.0 mg/dLCHOLESTEROL 199.0 mg/dLHDL 32.0 mg/dLTOT

 CHOL/HDL 6.2 LDL (CALC) 146.0 mg/dLGLUCOSE 96.0 mg/dLSODIUM 143.0 
mmol/LPOTASSIUM 4.0 mmol/LCHLORIDE 113.0 mmol/LCO2 24.0 mmol/LBUN 13.0 
mg/dLCREATININE 1.0 mg/dLSGOT/AST 11.0 IU/LSGPT/ALT 6.0 IU/LALK PHOS 56.0 
IU/LTOTAL PROTEIN 6.60 g/dLALBUMIN 3.80 g/dLTOTAL BILI 0.50 mg/dLCALCIUM 9.30 
mg/dLAGE 59 GFR NonAA 57 GFR AA 69 eGFR 57 eGFR AA* >60 

 

                          10/6/2010 12:00 AM        Cholest Cry Stone Ql .0 %LDLc SerPl-mCnc 111.0 mg/dLGlucose

 SerPl-mCnc 81.0 mg/dL

 

                          10/6/2010 2:45 PM         TRIGLYCERIDES 123.0 mg/dLCHOLESTEROL 178.0 mg/dLHDL 42.0 mg/dLTOT

 CHOL/HDL 4.2 LDL (CALC) 111.0 mg/dLGLUCOSE 81.0 mg/dLSODIUM 139.0 
mmol/LPOTASSIUM 4.10 mmol/LCHLORIDE 106.0 mmol/LCO2 24.0 mmol/LBUN 13.0 
mg/dLCREATININE 0.90 mg/dLSGOT/AST 13.0 IU/LSGPT/ALT 11.0 IU/LALK PHOS 61.0 
IU/LTOTAL PROTEIN 7.10 g/dLALBUMIN 3.90 g/dLTOTAL BILI 0.30 mg/dLCALCIUM 9.30 
mg/dLAGE 60 GFR NonAA 64 GFR AA 78 eGFR >60 mL/min/1.73 m2eGFR AA* >60 WBC 6.9 
RBC 3.59 HGB 11.50 g/dLHCT 35.30 %MCV 98.0 fLMCH 32.0 pgMCHC 32.60 g/dLRDW SD 46
 RDW CV 12.90 %MPV 9.90 fLPLT 311 NRBC# 0.00 NRBC% 0.0 %NEUT 64.90 %%LYMP 22.50 
%%MONO 7.20 %%EOS 5.10 %%BASO 0.30 %#NEUT 4.45 #LYMP 1.54 #MONO 0.49 #EOS 0.35 
#BASO 0.02 MANUAL DIFF NOT IND 

 

                          2011 12:00 AM         Mammogram -Women over 40 Ordered 

 

                          2011 10:25 AM        TRIGLYCERIDES 111.0 mg/dLCHOLESTEROL 195.0 mg/dLHDL 43.0 mg/dLTOT

 CHOL/HDL 4.5 LDL (CALC) 130.0 mg/dLWBC 5.3 RBC 3.76 HGB 12.0 g/dLHCT 37.80 %MCV
 101.0 fLMCH 31.90 pgMCHC 31.70 g/dLRDW SD 47 RDW CV 13.0 %MPV 9.70 fLPLT 259 
NRBC# 0.00 NRBC% 0.0 %NEUT 69.0 %%LYMP 17.60 %%MONO 8.30 %%EOS 4.70 %%BASO 0.40 
%#NEUT 3.63 #LYMP 0.93 #MONO 0.44 #EOS 0.25 #BASO 0.02 MANUAL DIFF NOT IND 
GLUCOSE 102.0 mg/dLSODIUM 146.0 mmol/LPOTASSIUM 4.20 mmol/LCHLORIDE 113.0 
mmol/LCO2 23.0 mmol/LBUN 15.0 mg/dLCREATININE 1.0 mg/dLSGOT/AST 12.0 
IU/LSGPT/ALT 17.0 IU/LALK PHOS 60.0 IU/LTOTAL PROTEIN 6.90 g/dLALBUMIN 4.20 
g/dLTOTAL BILI 0.40 mg/dLCALCIUM 9.70 mg/dLAGE 60 GFR NonAA 57 GFR AA 69 eGFR 57
 eGFR AA* >60 

 

                          2011 11:49 AM        Cholest Cry Stone Ql .0 %LDLc SerPl-mCnc 130.0 mg/dLHDLc

 SerPl-mCnc 43.0 mg/dLTrigl SerPl-mCnc 111.0 mg/dLGlucose SerPl-mCnc 102.0 mg/dL

 

                          2011 11:52 AM        Pap Smear Declined 

 

                          2011 11:28 AM        Lithium 2.080 mmol/LGLUCOSE 102.0 mg/dLSODIUM 135.0 mmol/LPOTASSIUM

 3.90 mmol/LCHLORIDE 106.0 mmol/LCO2 21.0 mmol/LBUN 12.0 mg/dLCREATININE 1.30 
mg/dLCALCIUM 10.70 mg/dLAGE 60 GFR NonAA 42 GFR AA 51 eGFR 42 eGFR AA* 51 

 

                          2011 8:58 AM          Lithium 0.690 mmol/L

 

                          2011 2:38 PM         VITAMIN B12 3483.0 pg/mL

 

                          2013 3:35 PM          WBC 5.1 RBC 3.73 HGB 11.70 g/dLHCT 36.40 %MCV 98.0 fLMCH 31.40

 pgMCHC 32.10 g/dLRDW SD 47 RDW CV 13.10 %MPV 9.80 fLPLT 224 NRBC# 0.00 NRBC% 
0.0 %NEUT 66.80 %%LYMP 19.10 %%MONO 9.0 %%EOS 4.90 %%BASO 0.20 %#NEUT 3.42 #LYMP
 0.98 #MONO 0.46 #EOS 0.25 #BASO 0.01 MANUAL DIFF NOT IND GLUCOSE 88.0 
mg/dLSODIUM 141.0 mmol/LPOTASSIUM 4.10 mmol/LCHLORIDE 110.0 mmol/LCO2 22.0 
mmol/LBUN 22.0 mg/dLCREATININE 1.10 mg/dLCALCIUM 9.80 mg/dLAGE 62 GFR NonAA 50 
GFR AA 61 eGFR 50 eGFR AA* 60 Lithium 0.760 mmol/L

 

                          2013 11:02 AM        TRIGLYCERIDES 106.0 mg/dLCHOLESTEROL 181.0 mg/dLHDL 46.0 mg/dLTOT

 CHOL/HDL 3.9 LDL (CALC) 114.0 mg/dLVITAMIN D 41.10 ng/mL

 

                          10/17/2014 10:10 AM       WBC 5.0 RBC 3.66 HGB 11.60 g/dLHCT 36.80 %.0 fLMCH 31.70

 pgMCHC 31.50 g/dLRDW SD 50 RDW CV 13.50 %MPV 10.10 fLPLT 209 NRBC# 0.00 NRBC% 
0.0 %NEUT 69.20 %%LYMP 21.0 %%MONO 6.40 %%EOS 3.20 %%BASO 0.20 %#NEUT 3.46 #LYMP
 1.05 #MONO 0.32 #EOS 0.16 #BASO 0.01 MANUAL DIFF NOT IND GLUCOSE 100.0 
mg/dLSODIUM 148.0 mmol/LPOTASSIUM 3.90 mmol/LCHLORIDE 114.0 mmol/LCO2 26.0 
mmol/LBUN 12.0 mg/dLCREATININE 1.20 mg/dLSGOT/AST 9.0 IU/LSGPT/ALT <6 IU/LALK 
PHOS 82.0 IU/LTOTAL PROTEIN 6.90 g/dLALBUMIN 4.0 g/dLTOTAL BILI 0.40 
mg/dLCALCIUM 10.50 mg/dLAGE 64 GFR NonAA 45 GFR AA 55 eGFR 45 eGFR AA* 55 
TRIGLYCERIDES 96.0 mg/dLCHOLESTEROL 155.0 mg/dLHDL 38.0 mg/dLTOT CHOL/HDL 4.1 
LDL (CALC) 98.0 mg/dLLithium 0.850 mmol/LCancer Antigen (CA) 125 8.30 U/mL

 

                          2015 10:25 AM        Lithium 0.790 mmol/LWBC 4.8 RBC 3.44 HGB 11.0 g/dLHCT 35.20 

%.0 fLMCH 32.0 pgMCHC 31.30 g/dLRDW SD 53 RDW CV 14.0 %MPV 9.30 fLPLT 210
 NRBC# 0.00 NRBC% 0.0 %NEUT 70.80 %%LYMP 17.20 %%MONO 8.10 %%EOS 3.50 %%BASO 
0.40 %#NEUT 3.41 #LYMP 0.83 #MONO 0.39 #EOS 0.17 #BASO 0.02 MANUAL DIFF NOT IND 
TRIGLYCERIDES 107.0 mg/dLCHOLESTEROL 174.0 mg/dLHDL 43.0 mg/dLTOT CHOL/HDL 4.0 
LDL (CALC) 110.0 mg/dLGLUCOSE 90.0 mg/dLSODIUM 145.0 mmol/LPOTASSIUM 3.80 
mmol/LCHLORIDE 115.0 mmol/LCO2 24.0 mmol/LBUN 17.0 mg/dLCREATININE 1.30 
mg/dLSGOT/AST 18.0 IU/LSGPT/ALT 17.0 IU/LALK PHOS 56.0 IU/LTOTAL PROTEIN 6.70 
g/dLALBUMIN 3.90 g/dLTOTAL BILI 0.40 mg/dLCALCIUM 9.80 mg/dLAGE 64 GFR NonAA 41 
GFR AA 50 eGFR 41 eGFR AA* 50 

 

                          2015 8:50 AM        WBC 5.8 RBC 3.29 HGB 10.70 g/dLHCT 34.0 %.0 fLMCH 32.50

 pgMCHC 31.50 g/dLRDW SD 52 RDW CV 13.60 %MPV 9.60 fLPLT 223 NRBC# 0.00 NRBC% 
0.0 %NEUT 69.60 %%LYMP 18.90 %%MONO 8.50 %%EOS 2.80 %%BASO 0.20 %#NEUT 4.03 
#LYMP 1.09 #MONO 0.49 #EOS 0.16 #BASO 0.01 MANUAL DIFF NOT IND Lithium 0.620 
mmol/LGLUCOSE 83.0 mg/dLSODIUM 139.0 mmol/LPOTASSIUM 3.90 mmol/LCHLORIDE 109.0 
mmol/LCO2 22.0 mmol/LBUN 19.0 mg/dLCREATININE 1.40 mg/dLSGOT/AST 19.0 
IU/LSGPT/ALT 21.0 IU/LALK PHOS 55.0 IU/LTOTAL PROTEIN 6.50 g/dLALBUMIN 3.90 
g/dLTOTAL BILI 0.50 mg/dLCALCIUM 9.60 mg/dLAGE 65 GFR NonAA 38 GFR AA 46 eGFR 38
 eGFR AA* 46 TRIGLYCERIDES 121.0 mg/dLCHOLESTEROL 192.0 mg/dLHDL 51.0 mg/dLTOT 
CHOL/HDL 3.8 .0 mg/dLFREE T4 0.79 TSH 1.210 uIU/mLHemoglobin A1c 5.40 
%Estim. Avg Glu (eAG) 108 

 

                          3/15/2016 8:08 AM         VITAMIN B12 696.0 pg/mL

 

                          3/23/2016 8:26 AM         WBC 7.0 RBC 3.61 HGB 11.80 g/dLHCT 37.70 %.0 fLMCH 32.70

 pgMCHC 31.30 g/dLRDW SD 49 RDW CV 12.50 %MPV 10.0 fLPLT 207 NRBC# 0.00 NRBC% 
0.0 %NEUT 73.60 %%LYMP 16.40 %%MONO 6.60 %%EOS 3.0 %%BASO 0.30 %#NEUT 5.15 #LYMP
 1.15 #MONO 0.46 #EOS 0.21 #BASO 0.02 MANUAL DIFF NOT IND Lithium 0.940 
mmol/LGLUCOSE 108.0 mg/dLSODIUM 143.0 mmol/LPOTASSIUM 4.30 mmol/LCHLORIDE 110.0 
mmol/LCO2 27.0 mmol/LBUN 16.0 mg/dLCREATININE 1.60 mg/dLSGOT/AST 13.0 
IU/LSGPT/ALT 7.0 IU/LALK PHOS 71.0 IU/LTOTAL PROTEIN 6.80 g/dLALBUMIN 4.0 
g/dLTOTAL BILI 0.20 mg/dLCALCIUM 10.40 mg/dLAGE 65 GFR NonAA 32 GFR AA 39 eGFR 
32 eGFR AA* 39 TRIGLYCERIDES 113.0 mg/dLCHOLESTEROL 169.0 mg/dLHDL 42.0 mg/dLTOT
 CHOL/HDL 4.0 LDL (CALC) 104.0 mg/dLFREE T4 0.86 TSH 2.20 uIU/mLHemoglobin A1c 
5.20 %Estim. Avg Glu (eAG) 103 

 

                          3/25/2016 9:17 AM         COLOR YELLOW APPEARANCE CLEAR SPEC GRAV 1.010 pH 7.0 PROTEIN 

NEGATIVE GLUCOSE NEGATIVE mg/dLKETONE NEGATIVE BILIRUBIN NEGATIVE BLOOD NEGATIVE
 NITRITE NEGATIVE LEUK SCREEN SMALL MICRO IND? SEE BELOW WBC/HPF 0-5 RBC/HPF 
NEGATIVE CASTS/LPF NEGATIVE /LPFCRYSTALS NEGATIVE MUCOUS THRDS NEGATIVE BACTERIA
 NEGATIVE EPITH CELLS FEW SQUAMOUS /HPFTRICHOMONAS NEGATIVE YEAST NEGATIVE 

 

                          2016 6:00 AM        GLUCOSE 91.0 mg/dLSODIUM 143.0 mmol/LPOTASSIUM 3.60 mmol/LCHLORIDE

 112.0 mmol/LCO2 23.0 mmol/LBUN 22.0 mg/dLCREATININE 1.20 mg/dLSGOT/AST 15.0 
IU/LSGPT/ALT 12.0 IU/LALK PHOS 61.0 IU/LTOTAL PROTEIN 5.40 g/dLALBUMIN 3.10 
g/dLTOTAL BILI 0.40 mg/dLCALCIUM 8.40 mg/dLAGE 66 GFR NonAA 45 GFR AA 55 eGFR 45
 eGFR AA* 55 WBC 3.0 RBC 3.05 HGB 9.80 g/dLHCT 32.10 %.0 fLMCH 32.10 
pgMCHC 30.50 g/dLRDW SD 54 RDW CV 14.20 %MPV 10.10 fLPLT 170 NRBC# 0.00 NRBC% 
0.0 %NEUT 50.70 %%LYMP 32.60 %%MONO 10.50 %%EOS 5.90 %%BASO 0.30 %#NEUT 1.54 
#LYMP 0.99 #MONO 0.32 #EOS 0.18 #BASO 0.01 MANUAL DIFF NOT IND 







History Of Immunizations







       Name    Date Admin    Mfg Name    Mfg Code    Trade Name    Lot#    Route    Inj    Vis Given    Vis

 Pub                                    CVX

 

        Influenza    2008    Not Entered    NE      Not Entered            Not Entered    Not Entered

                    1            999

 

        X       12/19/2008    [employer]    MSD     Pneumovax 23            Intramuscular    Not Entered

                    1            999

 

           Influenza    10/15/2010    [employer]    NOV        Fluvirin > 12 Years    

[id number]     Intramuscular    Left Deltoid    10/15/2010    2009    999

 

          X         3/23/2015    [facility]    [facility]       Prevnar 13    [id number]    Intramuscular

                Right Gluteous Medius    3/23/2015       2013       109







History of Past Illness







                    Name                Date of Onset       Comments

 

                    Peritoneal Neoplasm, Malignant                         

 

                    Hyperlipidemia                           

 

                    Bipolar disorder, unspecified                         

 

                    Artificial opening status; colostomy                         

 

                    B12 deficiency                           

 

                    Anemia, Pernicious                         

 

                    Arthritis unspecified                         

 

                    cervical cancer                          

 

                    Artificial opening status; colostomy    2010  1:10PM     

 

                    Bipolar disorder, unspecified    2010  1:10PM     

 

                    Hyperlipidemia      2010  1:10PM     

 

                    Anemia, Pernicious    2010  1:10PM     

 

                    Postoperative Follow-Up    2010  1:55PM     

 

                    Postoperative Follow-Up    Mar  8 2010 10:57AM     

 

                    Artificial opening status; colostomy    Mar  8 2010  1:19PM     

 

                    Peritoneal Neoplasm, Malignant    Mar  8 2010  1:19PM     

 

                    Artificial opening status; colostomy    2010  1:40PM     

 

                    Hyperlipidemia      2010  1:40PM     

 

                    Anemia, Pernicious    2010  1:40PM     

 

                    Peritoneal Neoplasm, Malignant    2010  1:40PM     

 

                    Arthritis unspecified    2010  1:40PM     

 

                    Anemia of Chronic Illness    2010  1:40PM     

 

                    Tinea corporis      2010  3:17PM     

 

                    Bipolar disorder, unspecified    2010  1:33PM     

 

                    Hyperlipidemia      2010  1:33PM     

 

                    Anemia, Pernicious    2010  1:33PM     

 

                    Peritoneal Neoplasm, Malignant    2010  1:33PM     

 

                    B12 deficiency      2010  1:33PM     

 

                    Ethmoidal Sinusitis, Acute    Sep 21 2010  3:53PM     

 

                    Wheezing            Sep 21 2010  3:53PM     

 

                    Flu                 Oct 15 2010  1:40PM     

 

                    Bipolar disorder, unspecified    Oct 15 2010  1:42PM     

 

                    Hyperlipidemia      Oct 15 2010  1:42PM     

 

                    Anemia, Pernicious    Oct 15 2010  1:42PM     

 

                    Peritoneal Neoplasm, Malignant    Oct 15 2010  1:42PM     

 

                    Bipolar disorder, unspecified    2011 12:01PM     

 

                    Hyperlipidemia      2011 12:01PM     

 

                    Anemia, Pernicious    2011 12:01PM     

 

                    Peritoneal Neoplasm, Malignant    2011 12:01PM     

 

                    Bipolar disorder, unspecified    Apr 15 2011 10:55AM     

 

                    Major Depression    2011 10:11AM     

 

                    Bipolar Disorder    2011 10:11AM     

 

                    Cancer              May 10 2011  4:16PM     

 

                    Major Depression    May 10 2011  3:16PM     

 

                    Bipolar Disorder    May 10 2011  3:16PM     

 

                    Hypercalcemia       May 23 2011  2:47PM     

 

                    Bipolar disorder, unspecified    May 23 2011  2:47PM     

 

                    Colon Cancer, Personal History    May 23 2011  2:47PM     

 

                    Bipolar Disorder    May 31 2011  4:39PM     

 

                    Depressive Disorder    2011 10:01AM     

 

                    Vitamin B12 deficiency    2011 10:01AM     

 

                    Vitamin D Deficiency    2011  5:07PM     

 

                    Anemia, Vitamin B12 Deficiency    2011  5:07PM     

 

                    B12 deficiency      2011  3:56PM     

 

                    Routine gynecological examination    Aug  4 2011  9:08AM     

 

                    Screening Examination for Breast Cancer    Aug  4 2011  9:08AM     

 

                    Tinea Corporis      Aug  4 2011  9:08AM     

 

                    Depressive Disorder    Sep 23 2011  8:47AM     

 

                    Contact Dermatitis    Sep 23 2011  8:47AM     

 

                    Anemia, Pernicious    Sep 23 2011  8:47AM     

 

                    B12 deficiency      Sep 23 2011  8:47AM     

 

                    B12 deficiency      Sep 27 2011  2:58PM     

 

                    B12 deficiency      Oct 20 2011  2:34PM     

 

                    Flu                 Dec  9 2011  3:16PM     

 

                    B12 deficiency      Dec  9 2011  3:17PM     

 

                    B12 deficiency      2012  4:52PM     

 

                    B12 deficiency      2012 11:10AM     

 

                    B12 deficiency      2012  3:37PM     

 

                    B12 deficiency      May  3 2012  4:10PM     

 

                    B12 deficiency      2012  2:54PM     

 

                    B12 deficiency      2012 11:23AM     

 

                    B12 deficiency      Aug  9 2012  2:08PM     

 

                    B12 deficiency      Sep  6 2012  4:36PM     

 

                    B12 deficiency      Oct 16 2012 10:23AM     

 

                    Flu                 Feb  4   3:11PM     

 

                    Bipolar disorder, unspecified    Feb  2013  2:48PM     

 

                    Anemia, Pernicious    Feb  4   2:48PM     

 

                    B12 deficiency      Feb  4   2:48PM     

 

                    Extrapyramidal abnormal movement disorder    Feb  4   2:48PM     

 

                    B12 deficiency      Apr  3 2013 12:03PM     

 

                    Bipolar disorder, unspecified    May  7 2013  1:31PM     

 

                    Anemia, Pernicious    May  7 2013  1:31PM     

 

                    B12 deficiency      May  7 2013  1:31PM     

 

                    Extrapyramidal abnormal movement disorder    May  7 2013  1:31PM     

 

                    B12 deficiency      2013  3:42PM     

 

                    B12 deficiency      2013  1:31PM     

 

                    Hyperlipidemia      Aug  7 2013 10:37AM     

 

                    Vitamin D Deficiency    Aug  7 2013 10:37AM     

 

                    Bipolar disorder, unspecified    Aug  7 2013 10:37AM     

 

                    Anemia, Pernicious    Aug  7 2013 10:37AM     

 

                    B12 deficiency      Aug  7 2013 10:37AM     

 

                    B12 deficiency      Sep 25 2013 11:15AM     

 

                    B12 deficiency      Dec 11 2013  3:16PM     

 

                    B12 deficiency      Mar  6 2014  1:48PM     

 

                    B12 deficiency      May 21 2014  3:17PM     

 

                    Screening Examination for Breast Cancer    2014  3:23PM     

 

                    Periumbilical abdominal pain    2014  3:23PM     

 

                    B12 deficiency      Jul 10 2014  2:52PM     

 

                    Anemia, Vitamin B12 Deficiency    Aug 13 2014  4:50PM     

 

                    Bipolar disorder    Oct 16 2014 11:13AM     

 

                    Hyperlipidemia      Oct 16 2014 11:13AM     

 

                    Anemia, Pernicious    Oct 16 2014 11:13AM     

 

                    Peritoneal Neoplasm, Malignant    Oct 16 2014 11:13AM     

 

                    Screening breast examination    Oct 16 2014 11:13AM     

 

                    Weight loss         Oct 16 2014 11:13AM     

 

                    Anemia, Pernicious    Mar 23 2015  2:57PM     

 

                    B12 deficiency      Mar 23 2015  2:57PM     

 

                    Need for Prevnar vaccine    Mar 23 2015  2:57PM     

 

                    Bipolar disorder    Mar 23 2015  2:57PM     

 

                    Hyperlipidemia      Mar 23 2015  2:57PM     

 

                    Anemia, Pernicious    Mar 23 2015  2:57PM     

 

                    Peritoneal Neoplasm, Malignant    Mar 23 2015  2:57PM     

 

                    B12 deficiency      May  4 2015  4:48PM     

 

                    Hyperlipidemia      May 13 2015  9:56AM     

 

                    Anemia              May 13 2015  9:56AM     

 

                    Bipolar disorder    May 13 2015  9:56AM     

 

                    Bipolar disorder    May 14 2015  3:27PM     

 

                    Hyperlipidemia      May 14 2015  3:27PM     

 

                    Anemia, Pernicious    May 14 2015  3:27PM     

 

                    Peritoneal Neoplasm, Malignant    May 14 2015  3:27PM     

 

                    B12 deficiency      2015  2:20PM     

 

                    B12 deficiency      2015 11:34AM     

 

                    B12 deficiency      Aug 18 2015  9:06AM     

 

                    Tinea Corporis      Sep 18 2015  8:54AM     

 

                    B12 deficiency      Sep 18 2015  8:54AM     

 

                    B12 deficiency      2015 10:28AM     

 

                    Herpes zoster without complication    Dec  3 2015  9:52AM     

 

                    B12 deficiency      Dec 23 2015 11:21AM     

 

                    B12 deficiency      2016  4:51PM     

 

                    Vitamin B 12 deficiency    Mar 14 2016  5:35PM     

 

                    B12 deficiency      Mar 15 2016 12:14PM     

 

                    B12 deficiency      May  5 2016 11:30AM     

 

                    Edema               May  5 2016 11:30AM     

 

                    Dermatitis          May  5 2016 11:30AM     

 

                    Edema               May 17 2016  8:38AM     

 

                    Shortness of breath    May 17 2016  8:38AM     

 

                    Bilateral edema of lower extremity    2016  2:06PM     

 

                    B12 deficiency      2016  2:06PM     

 

                    B12 deficiency      2016 11:50AM     

 

                    B12 deficiency      2016 11:20AM     

 

                    Diarrhea            Aug  2 2016  3:13PM     

 

                    B12 deficiency      Aug 24 2016 11:10AM     

 

                    Encounter for screening mammogram for breast cancer    Aug 24 2016 11:44AM     

 

                    B12 deficiency      Sep 28 2016  2:35PM     

 

                    B12 deficiency      Dec 15 2016  2:02PM     

 

                    Dysuria             Dec 29 2016 12:14PM     







Payers







           Insurance Name    Company Name    Plan Name    Plan Number    Policy Number    Policy Group

 Number                                 Start Date

 

                    Medicare Part A    Medicare C              [id number]              N/A

 

                          [employer]    [employer]                 [id number]

                                                    

 

                    Medicare Part A    Medicare - Lab/Xray              [id number]              2006

 

                    Medicare Part B    [employer] [place]              [id number]              2006

 

                          [email or web address]    [email or web address]                 50 percent

                                                    2009

 

                    Human    [facility]              [id number]              N/A

 

                    Medicare Part A    Medicare Part A              [id number]              N/A

 

                    Medicare Part A    Medicare Part A              [id number]              2006









History of Encounters







                    Visit Date          Visit Type          Provider

 

                    12/15/2016          Nurse visit         Bhupinder Louise DO

 

                    2016           Nurse visit         Bret Forte APRFIORELLA

 

                    2016           Nurse visit         Bhupinder Louise DO

 

                    2016            Office visit        Bhupinder Louise DO

 

                    2016           Nurse visit         Bhupinder Louise DO

 

                    2016           Office visit        Bret Forte APRFIORELLA

 

                    2016            Office visit        Bhupinder Aspen DO

 

                    3/15/2016           Nurse visit         Bhupinder Louise DO

 

                    2016            Nurse visit         Bhupinder Aspen DO

 

                    2015          Nurse visit         Bhupinder Aspen DO

 

                    12/3/2015           Office visit        Bhupinder Louise DO

 

                    2015          Nurse visit         Bhupinder Louise DO

 

                    2015           Office visit        Bhupinder Louise DO

 

                    2015           Nurse visit         Bhupinder Louise DO

 

                    2015            Nurse visit         Bhupinder Louise DO

 

                    2015            Nurse visit         Bhupinder Louise DO

 

                    2015           Office visit        Bhupinder Aspen DO

 

                    2015            Nurse visit         Bhupinder Aspen DO

 

                    3/23/2015           Office visit        Bhupinder Aspen DO

 

                    10/16/2014          Office visit        Bhupinder Aspen DO

 

                    2014           Nurse visit         Radha Weston APRN

 

                    7/10/2014           Nurse visit         Bhupinder Aspen DO

 

                    2014           Office visit        Bhupinder Aspen DO

 

                    2014           Nurse visit         Bhupinder Aspen DO

 

                    3/6/2014            Nurse visit         Bhupinder Aspen DO

 

                    2014            [facility]            EARNEST Lopez MD

 

                    2013          Nurse visit         Bhupinder Aspen DO

 

                    2013           Nurse visit         Bhupinder Aspen DO

 

                    2013            Office visit        Bhupinder Aspen DO

 

                    2013            Nurse visit         Bhupinder Aspen DO

 

                    2013            Nurse visit         Bhupinder Aspen DO

 

                    2013            Office visit        Bhupinder Aspen DO

 

                    4/3/2013            Nurse visit         Bhupinder Aspen DO

 

                    2013            Office visit        Bhupinder Aspen DO

 

                    10/16/2012          Nurse visit         Bhupinder Aspen DO

 

                    2012            Nurse visit         Bhupinder Aspen DO

 

                    2012            Voided              Bhupinder Aspen DO

 

                    2012            Nurse visit         Bhupinder Aspen DO

 

                    2012            Nurse visit         Bhupinder Aspen DO

 

                    2012           Nurse visit         Bhupinder Aspen DO

 

                    5/3/2012            Nurse visit         Bhupinder Aspen DO

 

                    2012           Nurse visit         Bhupinder Aspen DO

 

                    2012           Nurse visit         Bhupinder Aspen DO

 

                    2012           Nurse visit         Bhupinder Aspen DO

 

                    2011           Nurse visit         Bhupinder Aspen DO

 

                    10/20/2011          Nurse visit         Bhupinder Aspen DO

 

                    2011           Office visit        Bhupinder Aspen DO

 

                    2011           Nurse visit         Radha Ontiveros APRN

 

                    2011            Office visit        Bhupinder Aspen DO

 

                    2011           Nurse visit         Bhupinder Aspen DO

 

                    2011            Office visit        Bhupinder Aspen DO

 

                    2011           Office visit        Bhupinder Aspen DO

 

                    5/10/2011           Office visit        Bhupinder Aspen DO

 

                    2011           Office visit        Bhupinder Aspen DO

 

                    4/15/2011           Office visit        Devin Angel DO

 

                    2011           Office visit        Devin Angel DO

 

                    10/15/2010          Office visit        Devin Angel DO

 

                    2010           Office visit        Devin Angel DO

 

                    2010            Office visit        Devin Angel DO

 

                    2010           Office visit        Devin Angel DO

 

                    2010            Office visit        Devin Angel DO

 

                    3/8/2010            Office visit        Devin Masterson MD

 

                    2010            Surgery             Devin Masterson MD

 

                    2010            Office visit        Devin Angel DO

 

                    2010           Surgery             Devin Masterson MD

 

                    2010           Hospital            Devin Masterson MD

 

                    2010           [facility]            Devin Masterson MD

 

                    10/22/2009          Office visit        Devin Angel DO

## 2019-06-26 NOTE — XMS REPORT
MU2 Ambulatory Summary

                             Created on: 2015



Pauline Gan

External Reference #: [id number]

: 1950

Sex: Female



Demographics







                          Address                   [address]

[place]  [address]

 

                          Home Phone                [phone]

 

                          Preferred Language        English

 

                          Marital Status            Legally 

 

                          Jain Affiliation     Unknown

 

                          Race                      White

 

                          Ethnic Group              Not  or 





Author







                          Author                    Bhupinder Louise

 

                          [facility]

 

                          Address                   [address]

[place], [place]  [id number]



 

                          Phone                     [phone]







Care Team Providers







                    Care Team Member Name    Role                Phone

 

                    Bhupinder Louise    PCP                 Unavailable







Allergies and Adverse Reactions







                    Name                Reaction            Notes

 

                    NO KNOWN DRUG ALLERGIES                         







Plan of Treatment







             Planned Activity    Comments     Planned Date    Planned Time    Plan/Goal

 

             COMPLETE CBC W/AUTO DIFF WBC                 2013     12:00 AM      

 

             ASSAY OF LITHIUM                 2013     12:00 AM      

 

             METABOLIC PANEL TOTAL CA                 2013     12:00 AM      

 

             VITAMIN B-12                 2013     12:00 AM      

 

             ASSAY OF FOLIC ACID SERUM                 2013     12:00 AM      







Medications







                                        Active 

 

             Name         Start Date    Estimated Completion Date    SIG          Comments

 

                          syringe with needle (disp) Misc.(Non-Drug; Combo Route) Syringe 1 mL 25 X 1"    2011

                                        use for injection once a month     

 

                Latuda oral tablet 20 mg                                    take 1 tablet (20 mg) by oral route once daily with

 food (at least 350 calories)            

 

             pravastatin oral tablet 40 mg    3/30/2015                 TAKE 1 TABLET BY MOUTH DAILY     









                                         

 

             Name         Start Date    Expiration Date    SIG          Comments

 

             Reglan 10mg    3/29/2010    2010    one ac and hs     

 

                Keflex Oral Capsule 500 mg    2010       10/1/2010       take 1 capsule (500 mg) by oral

 route every 6 hours for 10 days         

 

                Bactrim DS Oral Tablet 800-160 mg    2011       take 1 tablet by oral route

 every 12 hours for 7 days               

 

                triamcinolone acetonide Topical Cream 0.1 %    2011      apply a thin

 layer to the affected area(s) by topical route 2 times per day     

 

                sertraline Oral tablet 100 mg    4/10/2012       5/10/2012       take 1.5 tablets by oral route

 daily for 30 days                       

 

                ergocalciferol (vitamin D2) Oral capsule 50,000 unit    4/15/2013       2013       TAKE

 ONE CAPSULE BY MOUTH ONCE A WEEK        

 

                CYANOCOBALAM 1000MCGINJ 1000 milliliter    2013       INJECT 1ML INTRAMUSCULAR

 ONCE A MONTH                            

 

                pravastatin Oral tablet 40 mg    3/25/2014       3/20/2015       TAKE ONE TABLET BY MOUTH EVERY

 DAY                                     

 

                          Zostavax (PF) subcutaneous suspension for reconstitution 19,400 unit/0.65 mL    3/23/2015

                    3/24/2015           inject 0.65 milliliter by subcutaneous route once     

 

                lithium carbonate Oral capsule 300 mg    2015       take 1 capsule (300

 mg) by oral route 2 for 30 days         









                                        Discontinued 

 

             Name         Start Date    Discontinued Date    SIG          Comments

 

                Tylenol Oral Tablet 325 mg                    2013        take 1 - 2 tablets (325 -650 mg) by oral

 route every 4-6 hours as needed         

 

                Calcium 600 + D(3) Oral Tablet 600-400 mg-unit                    2011       take 1 tablet by oral

 route 2 times a day                    no longer taking

 

                Vitamin B-12 Oral Tablet Sustained Release 1,000 mcg    2010       take 

1 tablet by oral route daily            no longer taking

 

                Antifungal (Clotrimazole) Topical Cream 1 %    2010       apply to the 

affected and surrounding areas of skin by topical route 2 times per day morning 
and evening                              

 

                sertraline Oral Tablet 100 mg    5/10/2011       2011       take 2 tablets (200 mg) by 

oral route once daily                   discontinued by Dr. Serrano

 

                mirtazapine Oral Tablet 15 mg                    2011        take 1 tablet (15 mg) by oral route 

once daily before bedtime               Dr. Serrano

 

                mirtazapine Oral Tablet 15 mg                    2011        take 1 tablet (15 mg) by oral route 

once daily before bedtime               dc'd by Dr. Serrano

 

                Pristiq Oral Tablet Extended Release 24 hr 50 mg                    2013        take 1 tablet (50

 mg) by oral route once daily           Dr. Zach Stoutiq Oral Tablet Extended Release 24 hr 50 mg                    2013        take 1 tablet (50

 mg) by oral route once daily           dose updated

 

                Vitamin B-12 Injection Solution 1,000 mcg/mL    2011        inject 1 milliliter

 (1,000 mcg) by intramuscular route once a month    on list already

 

                clotrimazole Topical Cream 1 %    2011        apply to the affected and surrounding

 areas of skin by topical route 2 times per day in the morning and evening     

 

                Vitamin D Oral Capsule 50,000 unit    2011        take 1 capsule (50,000 

unit) by oral route once weekly         generic on list

 

                Pravachol Oral Tablet 40 mg    2012        take 1 tablet (40 mg) by oral 

route once daily for 90 days            generic on list

 

                lithium carbonate Oral Capsule 300 mg    2012        take 1 capsule by oral

 route daily                            dose updated

 

                Pristiq Oral tablet extended release 24 hr 100 mg                    4/10/2012       take 1 and 1/2 

tablet (150 mg) by oral route once daily    Mental Health provider

 

                Pristiq Oral tablet extended release 24 hr 100 mg                    4/10/2012       take 1 and 1/2 

tablet (150 mg) by oral route once daily    Discontinued by Dr Efrain Knight at [facility]

 

                hydroxyzine HCl oral tablet 50 mg    10/16/2014      2015       take 1 tablet (50 mg) 

by oral route at bedtime                 







Problem List







                    Description         Status              Onset

 

                    Artificial opening status; colostomy    Active               

 

                    Bipolar disorder, unspecified    Active               

 

                    Hyperlipidemia      Active               

 

                    Peritoneal Neoplasm, Malignant    Active               

 

                    Anemia, Pernicious    Active               

 

                    Arthritis unspecified    Active               

 

                    B12 deficiency      Active               







Vital Signs







      Date    Time    BP-Sys(mm[Hg]    BP-Lynn(mm[Hg])    HR(bpm)    RR(rpm)    Temp    WT    HT    HC    BMI

                    BSA                 BMI Percentile      O2 Sat(%)

 

        2015    3:25:00 PM    120 mmHg    62 mmHg    72 bpm    16 rpm    98.1 F    136 lbs    69 in

                          20.08 kg/m2    1.73 m2                   98 %

 

       3/23/2015    2:55:00 PM    130 mmHg    76 mmHg    68 bpm    18 rpm    97 F    140 lbs    69 in    

                20.6742 kg/m    1.7583 m                      98 %

 

        10/16/2014    11:11:00 AM    120 mmHg    66 mmHg    77 bpm    20 rpm    98 F    130 lbs    69 in

                          19.20 kg/m2    1.69 m2                   100 %

 

        2014    3:21:00 PM    130 mmHg    66 mmHg    63 bpm    18 rpm    97.2 F    160 lbs    69 in

                          23.6276 kg/m    1.8797 m                 99 %

 

        2013    10:35:00 AM    132 mmHg    70 mmHg    66 bpm    20 rpm    98.1 F    157 lbs    69 in

                          23.18 kg/m2    1.86 m2                    

 

        2013    1:29:00 PM    132 mmHg    70 mmHg    76 bpm    18 rpm    98.2 F    166 lbs    69 in 

                          24.5137 kg/m    1.9146 m                  

 

       2013    2:46:00 PM    128 mmHg    70 mmHg    76 bpm    16 rpm    98 F    160 lbs    69 in     

                23.63 kg/m2     1.88 m2                          

 

        2011    8:49:00 AM    128 mmHg    78 mmHg    70 bpm    18 rpm    97.9 F    164 lbs    69 in

                          24.2183 kg/m    1.903 m                  

 

     2011    1:31:00 PM    132 mmHg    68 mmHg    84 bpm         97 F    167 lbs                        

                                         

 

        2011    9:09:00 AM    128 mmHg    70 mmHg    72 bpm    18 rpm    98.2 F    163 lbs    64 in 

                          27.9786 kg/m    1.8272 m                  

 

       2011    10:01:00 AM    132 mmHg    70 mmHg    72 bpm    18 rpm    98.2 F    154 lbs             

                                                                 

 

       2011    2:47:00 PM    128 mmHg    70 mmHg    72 bpm    18 rpm    97.8 F    156 lbs             

                                                                 

 

       5/10/2011    3:16:00 PM    144 mmHg    80 mmHg    72 bpm    18 rpm    98.2 F    158 lbs             

                                                                 

 

        2011    10:11:00 AM    132 mmHg    70 mmHg    70 bpm    18 rpm    98.2 F    168 lbs    69 in

                          24.809 kg/m    1.9261 m                  

 

        4/15/2011    10:52:00 AM    110 mmHg    60 mmHg    75 bpm    16 rpm    97.5 F    172.375 lbs    

69 in                     25.46 kg/m2    1.95 m2                   100 %

 

        2011    11:43:00 AM    120 mmHg    82 mmHg    75 bpm    16 rpm    97.2 F    178.5 lbs    69

 in                       26.3596 kg/m    1.9854 m                 100 %

 

        10/15/2010    1:32:00 PM    120 mmHg    70 mmHg    80 bpm    18 rpm    96.6 F    177 lbs    69 in

                          26.14 kg/m2    1.98 m2                   100 %

 

        2010    3:50:00 PM    168 mmHg    100 mmHg    82 bpm    18 rpm    97.8 F    177.5 lbs    69

 in                       26.2119 kg/m    1.9798 m                 97 %

 

        2010    1:21:00 PM    140 mmHg    80 mmHg    59 bpm    16 rpm    97.6 F    173.25 lbs    69 

in                        25.58 kg/m2    1.96 m2                   100 %

 

        2010    3:02:00 PM    140 mmHg    80 mmHg    61 bpm    16 rpm    97.6 F    173.125 lbs    69

 in                       25.5658 kg/m    1.9553 m                 99 %

 

        2010    1:23:00 PM    130 mmHg    80 mmHg    66 bpm    16 rpm    96.8 F    173 lbs    69 in 

                          25.55 kg/m2    1.95 m2                   100 %

 

        2010    12:58:00 PM    130 mmHg    88 mmHg    75 bpm    16 rpm    98.4 F    172.25 lbs    69

 in                       25.4366 kg/m    1.9503 m                 100 %







Social History







                    Name                Description         Comments

 

                    denies alcohol use                         

 

                    denies smoking                           

 

                    Denies illicit substance abuse                         

 

                    retired                                 direct care

 

                    Single                                   

 

                    Exercises regularly                         

 

                    Attended some college                         







History of Procedures







                    Date Ordered        Description         Order Status

 

                    2010 12:00 AM    COMPREHEN METABOLIC PANEL    Reviewed

 

                    2010 12:00 AM    COMPLETE CBC W/AUTO DIFF WBC    Reviewed

 

                    2010 12:00 AM    LIPID PANEL         Reviewed

 

                    2011 12:00 AM    MAMMOGRAM SCREENING    Reviewed

 

                    2011 12:00 AM    CYTOPATH C/V THIN LAYER    Reviewed

 

                    2011 12:00 AM    B12 Injection 1 cc NDC#[address]-45    Reviewed

 

                    2011 12:00 AM    THER/PROPH/DIAG INJ SC/IM    Reviewed

 

                    2011 12:00 AM    B12 Injection([facility]) Ndc#0006-6962-39-    Reviewed

 

                    10/20/2011 12:00 AM    THER/PROPH/DIAG INJ SC/IM    Reviewed

 

                    10/20/2011 12:00 AM    B12 Injection([facility]) Ndc#3557-5855-45-    Reviewed

 

                    2011 12:00 AM    ***Immunization administration, Medicare flu    Reviewed

 

                    2011 12:00 AM    Fluzone ** MEDICARE Only **    Reviewed

 

                    2011 12:00 AM    THER/PROPH/DIAG INJ SC/IM    Reviewed

 

                    2011 12:00 AM    B12 Injection ([facility]) Ndc#1122-4079-28    Reviewed

 

                    2012 12:00 AM    B12 Injection([place]) Ndc#5834-4705-16-    Ordered

 

                    2012 12:00 AM    THER/PROPH/DIAG INJ SC/IM    Reviewed

 

                    2012 12:00 AM    B12 Injection ([facility]) Ndc#5024-4057-22    Reviewed

 

                    2012 12:00 AM    THER/PROPH/DIAG INJ SC/IM    Reviewed

 

                    2012 12:00 AM    B12 Injection([facility]) Ndc#0963-8667-82-    Reviewed

 

                    5/3/2012 12:00 AM    THER/PROPH/DIAG INJ SC/IM    Reviewed

 

                    5/3/2012 12:00 AM    B12 Injection([facility]) Ndc#2531-0485-75-    Reviewed

 

                    2012 12:00 AM    IMMUNOTHERAPY INJECTIONS    Reviewed

 

                    2012 12:00 AM    B12 Injection([facility]) Ndc#9674-2112-00-    Reviewed

 

                    2012 12:00 AM    THER/PROPH/DIAG INJ SC/IM    Reviewed

 

                    2012 12:00 AM    B12 Injection, Up to 1000 Mcg NDC#1820-7564-64    Reviewed

 

                    2012 12:00 AM    THER/PROPH/DIAG INJ SC/IM    Reviewed

 

                    2012 12:00 AM    B12 Injection, Up to 1000 Mcg NDC#2963-4166-02    Reviewed

 

                    2012 12:00 AM    THER/PROPH/DIAG INJ SC/IM    Reviewed

 

                    2012 12:00 AM    B12 Injection, Up to 1000 Mcg NDC#4613-0007-86    Reviewed

 

                    10/16/2012 12:00 AM    THER/PROPH/DIAG INJ SC/IM    Reviewed

 

                    10/16/2012 12:00 AM    B12 Injection, Up to 1000 Mcg NDC#1334-6860-05    Reviewed

 

                    2010 12:00 AM    COMPREHEN METABOLIC PANEL    Reviewed

 

                    2010 12:00 AM    COMPLETE CBC W/AUTO DIFF WBC    Reviewed

 

                    2010 12:00 AM    LIPID PANEL         Reviewed

 

                    2013 12:00 AM    Flu Injection 3 Years And Above NDC# [address]-58  [facility]    Reviewed



 

                    2013 12:00 AM    COMPLETE CBC W/AUTO DIFF WBC    Reviewed

 

                    2013 12:00 AM    ASSAY OF LITHIUM    Reviewed

 

                    2013 12:00 AM    METABOLIC PANEL TOTAL CA    Reviewed

 

                    4/3/2013 12:00 AM    THER/PROPH/DIAG INJ SC/IM    Reviewed

 

                    4/3/2013 12:00 AM    B12 Injection, Up to 1000 Mcg NDC#2499-5595-72    Reviewed

 

                    2013 12:00 AM    COMPLETE CBC W/AUTO DIFF WBC    Ordered

 

                    2013 12:00 AM    ASSAY OF LITHIUM    Ordered

 

                    2013 12:00 AM    METABOLIC PANEL TOTAL CA    Ordered

 

                    2013 12:00 AM    VITAMIN B-12        Ordered

 

                    2013 12:00 AM    ASSAY OF FOLIC ACID SERUM    Ordered

 

                    2013 12:00 AM    THER/PROPH/DIAG INJ SC/IM    Reviewed

 

                    2013 12:00 AM    B12 Injection, Up to 1000 Mcg NDC#8299-2225-69    Reviewed

 

                    2013 12:00 AM    THER/PROPH/DIAG INJ SC/IM    Reviewed

 

                    2013 12:00 AM    B12 Injection, Up to 1000 Mcg NDC#7210-8402-57    Reviewed

 

                    2013 12:00 AM    LIPID PANEL         Reviewed

 

                    2013 12:00 AM    VITAMIN D 25 HYDROXY    Reviewed

 

                    2013 12:00 AM    THER/PROPH/DIAG INJ SC/IM    Ordered

 

                    2013 12:00 AM    B12 Injection, Up to 1000 Mcg NDC#7993-5568-09    Ordered

 

                    2013 12:00 AM    THER/PROPH/DIAG INJ SC/IM    Reviewed

 

                    3/6/2014 12:00 AM    THER/PROPH/DIAG INJ SC/IM    Reviewed

 

                    2014 12:00 AM    THER/PROPH/DIAG INJ SC/IM    Reviewed

 

                    2014 12:00 AM    B12 Injection, Up to 1000 Mcg NDC#3615-8497-98    Reviewed

 

                    2010 12:00 AM    SKIN FUNGI CULTURE    Reviewed

 

                    10/9/2010 12:00 AM    COMPREHEN METABOLIC PANEL    Reviewed

 

                    10/9/2010 12:00 AM    LIPID PANEL         Reviewed

 

                    2010 12:00 AM    THER/PROPH/DIAG INJ SC/IM    Reviewed

 

                    2010 12:00 AM    B12 Injection Ndc#[address]-09 ([place])    Reviewed

 

                    2010 12:00 AM    THER/PROPH/DIAG INJ SC/IM    Reviewed

 

                    2010 12:00 AM    Kenalog 40 Mg Im-Ndc#[address]-51 ([place])    Reviewed

 

                    10/15/2010 12:00 AM    FLU VACCINE 3 YRS & > IM    Reviewed

 

                    10/15/2010 12:00 AM    Admin.Of M/C Cov.Vaccine-Flu Vacc.    Reviewed

 

                    1/15/2011 12:00 AM    COMPLETE CBC W/AUTO DIFF WBC    Reviewed

 

                    1/15/2011 12:00 AM    COMPREHEN METABOLIC PANEL    Reviewed

 

                    1/15/2011 12:00 AM    LIPID PANEL         Reviewed

 

                    2014 12:00 AM    MAMMOGRAM SCREENING    Reviewed

 

                    2014 12:00 AM    Screening mammography, bilateral    Reviewed

 

                    7/10/2014 12:00 AM    THER/PROPH/DIAG INJ SC/IM    Reviewed

 

                    7/10/2014 12:00 AM    B12 Injection, Up to 1000 Mcg NDC#9210-6869-65    Reviewed

 

                    2011 12:00 AM    COMPLETE CBC W/AUTO DIFF WBC    Reviewed

 

                    2011 12:00 AM    COMPREHEN METABOLIC PANEL    Reviewed

 

                    2011 12:00 AM    LIPID PANEL         Reviewed

 

                    2014 12:00 AM    B12 Injection, Up to 1000 Mcg NDC#6724-1460-01    Reviewed

 

                    10/19/2014 12:00 AM    MAMMOGRAM SCREENING    Reviewed

 

                    10/19/2014 12:00 AM    Screening mammography, bilateral    Reviewed

 

                    10/16/2014 12:00 AM    B12 Injection, Up to 1000 Mcg NDC#7884-1828-40    Ordered

 

                    10/16/2014 12:00 AM    COMPLETE CBC W/AUTO DIFF WBC    Reviewed

 

                    10/16/2014 12:00 AM    COMPREHEN METABOLIC PANEL    Reviewed

 

                    10/16/2014 12:00 AM    IMMUNOASSAY TUMOR     Reviewed

 

                    10/16/2014 12:00 AM    LIPID PANEL         Reviewed

 

                    10/16/2014 12:00 AM    ASSAY OF LITHIUM    Reviewed

 

                    10/16/2014 12:00 AM    MAMMOGRAM SCREENING    Reviewed

 

                    2011 12:00 AM    ASSAY OF PARATHORMONE    Reviewed

 

                    2011 12:00 AM    VITAMIN D 25 HYDROXY    Reviewed

 

                    2011 12:00 AM    ASSAY OF LITHIUM    Reviewed

 

                    2011 12:00 AM    METABOLIC PANEL TOTAL CA    Reviewed

 

                    2011 12:00 AM    CT HEAD/BRAIN W/O & W/DYE    Reviewed

 

                    3/23/2015 12:00 AM    PNEUMOCOCCAL VACC 13 GLENDY IM    Reviewed

 

                    3/23/2015 12:00 AM    Vitamin B12 injection    Reviewed

 

                    2011 12:00 AM    ASSAY OF LITHIUM    Reviewed

 

                    2011 12:00 AM    B12 Injection Ndc#[address]-62  [place]    Reviewed

 

                    2015 12:00 AM    THER/PROPH/DIAG INJ SC/IM    Reviewed

 

                    2015 12:00 AM    B12 Injection, Up to 1000 Mcg NDC#4281-5534-96    Reviewed

 

                    2015 12:00 AM    COMPLETE CBC W/AUTO DIFF WBC    Reviewed

 

                    2015 12:00 AM    COMPREHEN METABOLIC PANEL    Reviewed

 

                    2015 12:00 AM    LIPID PANEL         Reviewed

 

                    2015 12:00 AM    ASSAY OF LITHIUM    Reviewed

 

                    2011 12:00 AM    VIT D 1 25-DIHYDROXY    Reviewed

 

                    2011 12:00 AM    VITAMIN B-12        Reviewed

 

                    2015 12:00 AM    B12 Injection, Up to 1000 Mcg NDC#2516-9838-46    Reviewed

 

                    2015 12:00 AM    THER/PROPH/DIAG INJ SC/IM    Reviewed

 

                    2015 12:00 AM    B12 Injection, Up to 1000 Mcg NDC#5587-1195-76    Reviewed

 

                    2011 12:00 AM    THER/PROPH/DIAG INJ SC/IM    Reviewed

 

                    2011 12:00 AM    B12 Injection ([facility]) Ndc#1237-1855-24    Reviewed

 

                    2015 12:00 AM    THER/PROPH/DIAG INJ SC/IM    Reviewed

 

                    2015 12:00 AM    B12 Injection, Up to 1000 Mcg NDC#0025-2958-20    Reviewed







Results Summary







                          Data and Description      Results

 

                          2004 12:00 AM        Colonoscopy-Women and Men over 50 Normal 

 

                          2008 12:00 AM         Pap Smear Declined 

 

                          10/7/2009 12:00 AM        Cholest Cry Stone Ql .0 %LDLc SerPl-mCnc 123.0 mg/dLHDLc

 SerPl-mCnc 34.0 mg/dLTrigl SerPl-mCnc 190.0 mg/dLGlucose SerPl-mCnc 78.0 mg/dL

 

                          2009 12:00 AM        Mammogram -Women over 40 Normal HIV1+2 Ab Ser Ql no risk 

 

                          2010 8:47 AM         Dexa Bone Scan Refused Aspirin reccommended Contraindication 



 

                          2010 8:48 AM         Depression Done 

 

                          2010 12:00 AM         Foot Exam-Diabetic Done 

 

                          2010 12:00 AM         Cholest Cry Stone Ql .0 %LDLc SerPl-mCnc 126.0 mg/dLGlucose

 SerPl-mCnc 102.0 mg/dL

 

                          2010 8:45 AM          TRIGLYCERIDES 122.0 mg/dLCHOLESTEROL 186.0 mg/dLHDL 36.0 mg/dLLDL

 (CALC) 126.0 mg/dLGLUCOSE 102.0 mg/dLSODIUM 143.0 mmol/LPOTASSIUM 3.70 
mmol/LCHLORIDE 111.0 mmol/LCO2 23.0 mmol/LBUN 10.0 mg/dLCREATININE 0.80 
mg/dLSGOT/AST 12.0 IU/LSGPT/ALT 11.0 IU/LALK PHOS 65.0 IU/LTOTAL PROTEIN 7.20 
g/dLALBUMIN 3.90 g/dLTOTAL BILI 0.50 mg/dLCALCIUM 10.20 mg/dLeGFR >60 
mL/min/1.73 m2WBC 5.7 RBC 3.26 HGB 10.60 g/dLHCT 31.70 %MCV 97.0 fLMCH 32.50 
pgMCHC 33.40 g/dLRDW CV 13.30 %MPV 9.70 fLPLT 287 %NEUT 62.90 %%LYMP 21.80 
%%MONO 9.90 %%EOS 5.0 %%BASO 0.40 %#NEUT 3.56 #LYMP 1.23 #MONO 0.56 #EOS 0.28 
#BASO 0.02 

 

                          2010 12:00 AM        Glucose SerPl-mCnc 96.0 mg/dLCholest Cry Stone Ql .0 %LDLc

 SerPl-mCnc 146.0 mg/dL

 

                          2010 8:26 AM         TRIGLYCERIDES 106.0 mg/dLCHOLESTEROL 199.0 mg/dLHDL 32.0 mg/dLLDL

 (CALC) 146.0 mg/dLGLUCOSE 96.0 mg/dLSODIUM 143.0 mmol/LPOTASSIUM 4.0 
mmol/LCHLORIDE 113.0 mmol/LCO2 24.0 mmol/LBUN 13.0 mg/dLCREATININE 1.0 
mg/dLSGOT/AST 11.0 IU/LSGPT/ALT 6.0 IU/LALK PHOS 56.0 IU/LTOTAL PROTEIN 6.60 
g/dLALBUMIN 3.80 g/dLTOTAL BILI 0.50 mg/dLCALCIUM 9.30 mg/dLeGFR 57 

 

                          10/6/2010 12:00 AM        Cholest Cry Stone Ql .0 %LDLc SerPl-mCnc 111.0 mg/dLGlucose

 SerPl-mCnc 81.0 mg/dL

 

                          10/6/2010 2:45 PM         TRIGLYCERIDES 123.0 mg/dLCHOLESTEROL 178.0 mg/dLHDL 42.0 mg/dLLDL

 (CALC) 111.0 mg/dLGLUCOSE 81.0 mg/dLSODIUM 139.0 mmol/LPOTASSIUM 4.10 
mmol/LCHLORIDE 106.0 mmol/LCO2 24.0 mmol/LBUN 13.0 mg/dLCREATININE 0.90 
mg/dLSGOT/AST 13.0 IU/LSGPT/ALT 11.0 IU/LALK PHOS 61.0 IU/LTOTAL PROTEIN 7.10 
g/dLALBUMIN 3.90 g/dLTOTAL BILI 0.30 mg/dLCALCIUM 9.30 mg/dLeGFR >60 mL/min/1.73
 m2WBC 6.9 RBC 3.59 HGB 11.50 g/dLHCT 35.30 %MCV 98.0 fLMCH 32.0 pgMCHC 32.60 
g/dLRDW CV 12.90 %MPV 9.90 fLPLT 311 %NEUT 64.90 %%LYMP 22.50 %%MONO 7.20 %%EOS 
5.10 %%BASO 0.30 %#NEUT 4.45 #LYMP 1.54 #MONO 0.49 #EOS 0.35 #BASO 0.02 

 

                          2011 12:00 AM         Mammogram -Women over 40 Ordered 

 

                          2011 10:25 AM        TRIGLYCERIDES 111.0 mg/dLCHOLESTEROL 195.0 mg/dLHDL 43.0 mg/dLLDL

 (CALC) 130.0 mg/dLWBC 5.3 RBC 3.76 HGB 12.0 g/dLHCT 37.80 %.0 fLMCH 
31.90 pgMCHC 31.70 g/dLRDW CV 13.0 %MPV 9.70 fLPLT 259 %NEUT 69.0 %%LYMP 17.60 
%%MONO 8.30 %%EOS 4.70 %%BASO 0.40 %#NEUT 3.63 #LYMP 0.93 #MONO 0.44 #EOS 0.25 
#BASO 0.02 GLUCOSE 102.0 mg/dLSODIUM 146.0 mmol/LPOTASSIUM 4.20 mmol/LCHLORIDE 
113.0 mmol/LCO2 23.0 mmol/LBUN 15.0 mg/dLCREATININE 1.0 mg/dLSGOT/AST 12.0 
IU/LSGPT/ALT 17.0 IU/LALK PHOS 60.0 IU/LTOTAL PROTEIN 6.90 g/dLALBUMIN 4.20 
g/dLTOTAL BILI 0.40 mg/dLCALCIUM 9.70 mg/dLeGFR 57 

 

                          2011 11:49 AM        Cholest Cry Stone Ql .0 %LDLc SerPl-mCnc 130.0 mg/dLHDLc

 SerPl-mCnc 43.0 mg/dLTrigl SerPl-mCnc 111.0 mg/dLGlucose SerPl-mCnc 102.0 mg/dL

 

                          2011 11:52 AM        Pap Smear Declined 

 

                          2011 11:28 AM        Lithium 2.080 mmol/LGLUCOSE 102.0 mg/dLSODIUM 135.0 mmol/LPOTASSIUM

 3.90 mmol/LCHLORIDE 106.0 mmol/LCO2 21.0 mmol/LBUN 12.0 mg/dLCREATININE 1.30 
mg/dLCALCIUM 10.70 mg/dLeGFR 42 

 

                          2011 8:58 AM          Lithium 0.690 mmol/L

 

                          2011 2:38 PM         VITAMIN B12 3483.0 pg/mL

 

                          2013 3:35 PM          WBC 5.1 RBC 3.73 HGB 11.70 g/dLHCT 36.40 %MCV 98.0 fLMCH 31.40

 pgMCHC 32.10 g/dLRDW CV 13.10 %MPV 9.80 fLPLT 224 %NEUT 66.80 %%LYMP 19.10 
%%MONO 9.0 %%EOS 4.90 %%BASO 0.20 %#NEUT 3.42 #LYMP 0.98 #MONO 0.46 #EOS 0.25 
#BASO 0.01 GLUCOSE 88.0 mg/dLSODIUM 141.0 mmol/LPOTASSIUM 4.10 mmol/LCHLORIDE 
110.0 mmol/LCO2 22.0 mmol/LBUN 22.0 mg/dLCREATININE 1.10 mg/dLCALCIUM 9.80 
mg/dLeGFR 50 Lithium 0.760 mmol/L

 

                          2013 11:02 AM        TRIGLYCERIDES 106.0 mg/dLCHOLESTEROL 181.0 mg/dLHDL 46.0 mg/dLLDL

 (CALC) 114.0 mg/dLVITAMIN D 41.10 ng/mL

 

                          10/17/2014 10:10 AM       WBC 5.0 RBC 3.66 HGB 11.60 g/dLHCT 36.80 %.0 fLMCH 31.70

 pgMCHC 31.50 g/dLRDW CV 13.50 %MPV 10.10 fLPLT 209 %NEUT 69.20 %%LYMP 21.0 
%%MONO 6.40 %%EOS 3.20 %%BASO 0.20 %#NEUT 3.46 #LYMP 1.05 #MONO 0.32 #EOS 0.16 
#BASO 0.01 GLUCOSE 100.0 mg/dLSODIUM 148.0 mmol/LPOTASSIUM 3.90 mmol/LCHLORIDE 
114.0 mmol/LCO2 26.0 mmol/LBUN 12.0 mg/dLCREATININE 1.20 mg/dLSGOT/AST 9.0 
IU/LSGPT/ALT <6 IU/LALK PHOS 82.0 IU/LTOTAL PROTEIN 6.90 g/dLALBUMIN 4.0 
g/dLTOTAL BILI 0.40 mg/dLCALCIUM 10.50 mg/dLeGFR 45 TRIGLYCERIDES 96.0 
mg/dLCHOLESTEROL 155.0 mg/dLHDL 38.0 mg/dLLDL (CALC) 98.0 mg/dLLithium 0.850 
mmol/LCancer Antigen (CA) 125 8.30 U/mL

 

                          2015 10:25 AM        Lithium 0.790 mmol/LWBC 4.8 RBC 3.44 HGB 11.0 g/dLHCT 35.20 

%.0 fLMCH 32.0 pgMCHC 31.30 g/dLRDW CV 14.0 %MPV 9.30 fLPLT 210 %NEUT 
70.80 %%LYMP 17.20 %%MONO 8.10 %%EOS 3.50 %%BASO 0.40 %#NEUT 3.41 #LYMP 0.83 
#MONO 0.39 #EOS 0.17 #BASO 0.02 TRIGLYCERIDES 107.0 mg/dLCHOLESTEROL 174.0 
mg/dLHDL 43.0 mg/dLLDL (CALC) 110.0 mg/dLGLUCOSE 90.0 mg/dLSODIUM 145.0 
mmol/LPOTASSIUM 3.80 mmol/LCHLORIDE 115.0 mmol/LCO2 24.0 mmol/LBUN 17.0 mg
/dLCREATININE 1.30 mg/dLSGOT/AST 18.0 IU/LSGPT/ALT 17.0 IU/LALK PHOS 56.0 
IU/LTOTAL PROTEIN 6.70 g/dLALBUMIN 3.90 g/dLTOTAL BILI 0.40 mg/dLCALCIUM 9.80 
mg/dLeGFR 41 







History Of Immunizations







       Name    Date Admin    Mfg Name    Mfg Code    Trade Name    Lot#    Route    Inj    Vis Given    Vis

 Pub                                    CVX

 

        Influenza    2008    Not Entered    NE      Not Entered            Not Entered    Not Entered

                    1            999

 

          Pneumococcal    2008    [employer]    MSD       Pneumovax 23              Intramuscular

                Not Entered     1        999

 

           Influenza    10/15/2010    [employer].    NOV        Fluvirin > 12 Years    

[id number]     Intramuscular    Left Deltoid    10/15/2010    2009    999

 

          Pneumococcal    3/23/2015    [employer]    [facility]       Prevnar 13    [id number]    Intramuscular

                Right Gluteous Medius    3/23/2015       2013       109







History of Past Illness







                    Name                Date of Onset       Comments

 

                    Peritoneal Neoplasm, Malignant                         

 

                    Hyperlipidemia                           

 

                    Bipolar disorder, unspecified                         

 

                    Artificial opening status; colostomy                         

 

                    B12 deficiency                           

 

                    Anemia, Pernicious                         

 

                    Arthritis unspecified                         

 

                    cervical cancer                          

 

                    Artificial opening status; colostomy    2010  1:10PM     

 

                    Bipolar disorder, unspecified    2010  1:10PM     

 

                    Hyperlipidemia      2010  1:10PM     

 

                    Anemia, Pernicious    2010  1:10PM     

 

                    Postoperative Follow-Up    2010  1:55PM     

 

                    Postoperative Follow-Up    Mar  8 2010 10:57AM     

 

                    Artificial opening status; colostomy    Mar  8 2010  1:19PM     

 

                    Peritoneal Neoplasm, Malignant    Mar  8 2010  1:19PM     

 

                    Artificial opening status; colostomy    2010  1:40PM     

 

                    Hyperlipidemia      2010  1:40PM     

 

                    Anemia, Pernicious    2010  1:40PM     

 

                    Peritoneal Neoplasm, Malignant    2010  1:40PM     

 

                    Arthritis unspecified    2010  1:40PM     

 

                    Anemia of Chronic Illness    2010  1:40PM     

 

                    Tinea corporis      2010  3:17PM     

 

                    Bipolar disorder, unspecified    2010  1:33PM     

 

                    Hyperlipidemia      2010  1:33PM     

 

                    Anemia, Pernicious    2010  1:33PM     

 

                    Peritoneal Neoplasm, Malignant    2010  1:33PM     

 

                    B12 deficiency      2010  1:33PM     

 

                    Ethmoidal Sinusitis, Acute    Sep 21 2010  3:53PM     

 

                    Wheezing            Sep 21 2010  3:53PM     

 

                    Flu                 Oct 15 2010  1:40PM     

 

                    Bipolar disorder, unspecified    Oct 15 2010  1:42PM     

 

                    Hyperlipidemia      Oct 15 2010  1:42PM     

 

                    Anemia, Pernicious    Oct 15 2010  1:42PM     

 

                    Peritoneal Neoplasm, Malignant    Oct 15 2010  1:42PM     

 

                    Bipolar disorder, unspecified    2011 12:01PM     

 

                    Hyperlipidemia      2011 12:01PM     

 

                    Anemia, Pernicious    2011 12:01PM     

 

                    Peritoneal Neoplasm, Malignant    2011 12:01PM     

 

                    Bipolar disorder, unspecified    Apr 15 2011 10:55AM     

 

                    Major Depression    2011 10:11AM     

 

                    Bipolar Disorder    2011 10:11AM     

 

                    Cancer              May 10 2011  4:16PM     

 

                    Major Depression    May 10 2011  3:16PM     

 

                    Bipolar Disorder    May 10 2011  3:16PM     

 

                    Hypercalcemia       May 23 2011  2:47PM     

 

                    Bipolar disorder, unspecified    May 23 2011  2:47PM     

 

                    Colon Cancer, Personal History    May 23 2011  2:47PM     

 

                    Bipolar Disorder    May 31 2011  4:39PM     

 

                    Depressive Disorder    2011 10:01AM     

 

                    Vitamin B12 deficiency    2011 10:01AM     

 

                    Vitamin D Deficiency    2011  5:07PM     

 

                    Anemia, Vitamin B12 Deficiency    2011  5:07PM     

 

                    B12 deficiency      2011  3:56PM     

 

                    Routine gynecological examination    Aug  4 2011  9:08AM     

 

                    Screening Examination for Breast Cancer    Aug  4 2011  9:08AM     

 

                    Tinea Corporis      Aug  4 2011  9:08AM     

 

                    Depressive Disorder    Sep 23 2011  8:47AM     

 

                    Contact Dermatitis    Sep 23 2011  8:47AM     

 

                    Anemia, Pernicious    Sep 23 2011  8:47AM     

 

                    B12 deficiency      Sep 23 2011  8:47AM     

 

                    B12 deficiency      Sep 27 2011  2:58PM     

 

                    B12 deficiency      Oct 20 2011  2:34PM     

 

                    Flu                 Dec  9 2011  3:16PM     

 

                    B12 deficiency      Dec  9 2011  3:17PM     

 

                    B12 deficiency      2012  4:52PM     

 

                    B12 deficiency      2012 11:10AM     

 

                    B12 deficiency      2012  3:37PM     

 

                    B12 deficiency      May  3 2012  4:10PM     

 

                    B12 deficiency      2012  2:54PM     

 

                    B12 deficiency      2012 11:23AM     

 

                    B12 deficiency      Aug  9 2012  2:08PM     

 

                    B12 deficiency      Sep  6 2012  4:36PM     

 

                    B12 deficiency      Oct 16 2012 10:23AM     

 

                    Flu                 Feb  2013  3:11PM     

 

                    Bipolar disorder, unspecified    Feb  2013  2:48PM     

 

                    Anemia, Pernicious    Feb  2013  2:48PM     

 

                    B12 deficiency      Feb  2013  2:48PM     

 

                    Extrapyramidal abnormal movement disorder    Feb  4   2:48PM     

 

                    B12 deficiency      Apr  3 2013 12:03PM     

 

                    Bipolar disorder, unspecified    May  7 2013  1:31PM     

 

                    Anemia, Pernicious    May  7 2013  1:31PM     

 

                    B12 deficiency      May  7 2013  1:31PM     

 

                    Extrapyramidal abnormal movement disorder    May  7 2013  1:31PM     

 

                    B12 deficiency      2013  3:42PM     

 

                    B12 deficiency      2013  1:31PM     

 

                    Hyperlipidemia      Aug  7 2013 10:37AM     

 

                    Vitamin D Deficiency    Aug  7 2013 10:37AM     

 

                    Bipolar disorder, unspecified    Aug  7 2013 10:37AM     

 

                    Anemia, Pernicious    Aug  7 2013 10:37AM     

 

                    B12 deficiency      Aug  7 2013 10:37AM     

 

                    B12 deficiency      Sep 25 2013 11:15AM     

 

                    B12 deficiency      Dec 11 2013  3:16PM     

 

                    B12 deficiency      Mar  6 2014  1:48PM     

 

                    B12 deficiency      May 21 2014  3:17PM     

 

                    Screening Examination for Breast Cancer    2014  3:23PM     

 

                    Periumbilical abdominal pain    2014  3:23PM     

 

                    B12 deficiency      Jul 10 2014  2:52PM     

 

                    Anemia, Vitamin B12 Deficiency    Aug 13 2014  4:50PM     

 

                    Bipolar disorder    Oct 16 2014 11:13AM     

 

                    Hyperlipidemia      Oct 16 2014 11:13AM     

 

                    Anemia, Pernicious    Oct 16 2014 11:13AM     

 

                    Peritoneal Neoplasm, Malignant    Oct 16 2014 11:13AM     

 

                    Screening breast examination    Oct 16 2014 11:13AM     

 

                    Weight loss         Oct 16 2014 11:13AM     

 

                    Anemia, Pernicious    Mar 23 2015  2:57PM     

 

                    B12 deficiency      Mar 23 2015  2:57PM     

 

                    Need for Prevnar vaccine    Mar 23 2015  2:57PM     

 

                    Bipolar disorder    Mar 23 2015  2:57PM     

 

                    Hyperlipidemia      Mar 23 2015  2:57PM     

 

                    Anemia, Pernicious    Mar 23 2015  2:57PM     

 

                    Peritoneal Neoplasm, Malignant    Mar 23 2015  2:57PM     

 

                    B12 deficiency      May  4 2015  4:48PM     

 

                    Hyperlipidemia      May 13 2015  9:56AM     

 

                    Anemia              May 13 2015  9:56AM     

 

                    Bipolar disorder    May 13 2015  9:56AM     

 

                    Bipolar disorder    May 14 2015  3:27PM     

 

                    Hyperlipidemia      May 14 2015  3:27PM     

 

                    Anemia, Pernicious    May 14 2015  3:27PM     

 

                    Peritoneal Neoplasm, Malignant    May 14 2015  3:27PM     

 

                    B12 deficiency      2015  2:20PM     

 

                    B12 deficiency      2015 11:34AM     

 

                    B12 deficiency      Aug 18 2015  9:06AM     







Payers







           Insurance Name    Company Name    Plan Name    Plan Number    Policy Number    Policy Group

 Number                                 Start Date

 

                    Medicare Part A    Medicare Part A              [id number]              N/A

 

                    [facility]              [id number]              N/A

 

                    Medicare Part B    [employer] [place]              [id number]              2006

 

                          [employer]    [employer]                 50 percent

                                                    2009







History of Encounters







                    Visit Date          Visit Type          Provider

 

                    2015           Nurse visit         Bhupinder Aspen DO

 

                    2015            Nurse visit         Bhupinder Aspen DO

 

                    2015            Nurse visit         Bhupinder Aspen DO

 

                    2015           Office visit        Bhupinder Aspen DO

 

                    2015            Nurse visit         Bhupinder Aspen DO

 

                    3/23/2015           Office visit        Bhupinder Aspen DO

 

                    10/16/2014          Office visit        Bhupinder Aspen DO

 

                    2014           Nurse visit         Radha GREEN

 

                    7/10/2014           Nurse visit         Bhupinder Aspen DO

 

                    2014           Office visit        Bhupinder Aspen DO

 

                    2014           Nurse visit         Bhupinder Aspen DO

 

                    3/6/2014            Nurse visit         Bhupinder Aspen DO

 

                    2014            [facility]            EARNEST Lopez MD

 

                    2013          Nurse visit         Bhupinder Aspen DO

 

                    2013           Nurse visit         Bhupinder Aspen DO

 

                    2013            Office visit        Bhupinder Aspen DO

 

                    2013            Nurse visit         Bhupinder Aspen DO

 

                    2013            Nurse visit         Bhupinder Aspen DO

 

                    2013            Office visit        Bhupinder Aspen DO

 

                    4/3/2013            Nurse visit         Bhupinder Aspen DO

 

                    2013            Office visit        Bhupinder Aspen DO

 

                    10/16/2012          Nurse visit         Bhupinder Aspen DO

 

                    2012            Voided              Bhupinder Aspen DO

 

                    2012            Nurse visit         Bhupinder Aspen DO

 

                    2012            Nurse visit         Bhupinder Aspen DO

 

                    2012            Nurse visit         Bhupinder Aspen DO

 

                    2012           Nurse visit         Bhupinder Aspen DO

 

                    5/3/2012            Nurse visit         Bhupinder Aspen DO

 

                    2012           Nurse visit         Bhupinder Aspen DO

 

                    2012           Nurse visit         Bhupinder Aspen DO

 

                    2012           Nurse visit         Bhupinder Aspen DO

 

                    2011           Nurse visit         Bhupinder Aspen DO

 

                    10/20/2011          Nurse visit         Bhupinder Aspen DO

 

                    2011           Office visit        Bhupinder Aspen DO

 

                    2011           Nurse visit         Radha Weston GREEN

 

                    2011            Office visit        Bhupinder Aspen DO

 

                    2011           Nurse visit         Bhupinder Aspen DO

 

                    2011            Office visit        Bhupinder Aspen DO

 

                    2011           Office visit        Bhupinder Aspen DO

 

                    5/10/2011           Office visit        Bhupinder Aspen DO

 

                    2011           Office visit        Bhupinder Aspen DO

 

                    4/15/2011           Office visit        Devin Angel DO

 

                    2011           Office visit        Devin Angel DO

 

                    10/15/2010          Office visit        Devin Angel DO

 

                    2010           Office visit        Devin Angel DO

 

                    2010            Office visit        Devin Angel DO

 

                    2010           Office visit        Devin Angel DO

 

                    2010            Office visit        Devin Angel DO

 

                    3/8/2010            Office visit        Devin Masterson MD

 

                    2010            Office visit        Devin Angel DO

 

                    2010            Surgery             Devin Masterson MD

 

                    2010           Surgery             Devin Masterson MD

 

                    2010           Hospital            Devin Masterson MD

 

                    2010           Hospital            Devin Masterson MD

 

                    10/22/2009          Office visit        Devin Angel DO

## 2019-06-26 NOTE — XMS REPORT
MU2 Ambulatory Summary

                             Created on: 2017



Pauline Gan

External Reference #: [id number]

: 1950

Sex: Female



Demographics







                          Address                   [address]

[place]  [id number]

 

                          Home Phone                [phone]

 

                          Preferred Language        English

 

                          Marital Status            Legally 

 

                          Jainism Affiliation     Unknown

 

                          Race                      White

 

                          Ethnic Group              Not  or 





Author







                          Author                    Bhupinder Louise

 

                          [facility]

 

                          Address                   [address]

[place]  [id number]



 

                          Phone                     [phone]







Care Team Providers







                    Care Team Member Name    Role                Phone

 

                    Bhupinder Louise    PCP                 Unavailable

 

                    Bhupinder Louise    PreferredProvider    Unavailable







Allergies and Adverse Reactions







                    Name                Reaction            Notes

 

                    NO KNOWN DRUG ALLERGIES                         







Plan of Treatment







             Planned Activity    Comments     Planned Date    Planned Time    Plan/Goal

 

             Injection,Subcutaneous/Intramuscul, [facility]                 2017    12:00 AM      







Medications







                                        Active 

 

             Name         Start Date    Estimated Completion Date    SIG          Comments

 

                Latuda 20 mg oral tablet                                    take 1 tablet (20 mg) by oral route once daily with

 food (at least 350 calories)            

 

             pravastatin 40 mg oral tablet    3/30/2015                 TAKE 1 TABLET BY MOUTH DAILY     

 

                Namenda XR 28 mg oral capsule,sprinkle,ER 24hr    2015                       take 1 capsule (28

 mg) by oral route once daily            

 

                Namenda XR 28 mg oral capsule,sprinkle,ER 24hr    2016                       take 1 capsule (28

 mg) by oral route once daily            

 

                potassium chloride 10 mEq oral tablet extended release    2016                       take 1 tablet

 (10 meq) by oral route once daily       

 

             pravastatin 40 mg oral tablet    2016                 TAKE 1 TABLET BY MOUTH DAILY     

 

                Vitamin B-12 1,000 mcg/mL injection solution    2016                       inject 1 milliliter 

(1,000 mcg) by intramuscular route once a month     

 

                potassium chloride 10 mEq oral tablet extended release    2016                      take 1 tablet

 (10 meq) by oral route once daily       

 

                Namenda XR 28 mg oral capsule,sprinkle,ER 24hr    2016                      TAKE 1 CAPSULE BY

 MOUTH EVERY DAY                         

 

                furosemide 40 mg oral tablet    2016                      take 1 tablet (40 mg) by oral route

 once daily                              

 

                mirtazapine 45 mg oral tablet                                    take 1 tablet (45 mg) by oral route once daily

 before bedtime                          

 

             Fish Oil 300-1,000 mg oral capsule                              take 1 capsule by oral route daily     

 

             Vitamin D3 1,000 unit oral tablet                              take 1 tablet by oral route daily     

 

                Calcium 600 600 mg calcium (1,500 mg) oral tablet                                    take 1 tablet by oral route

 daily                                   

 

                Aspirin Low Dose 81 mg oral tablet,delayed release (DR/EC)                                    take 1 tablet 

(81 mg) by oral route once daily         









                                         

 

             Name         Start Date    Expiration Date    SIG          Comments

 

             Reglan 10mg    3/29/2010    2010    one ac and hs     

 

                Keflex 500 mg oral capsule    2010       10/1/2010       take 1 capsule (500 mg) by oral

 route every 6 hours for 10 days         

 

                Bactrim -160 mg oral tablet    2011       take 1 tablet by oral route

 every 12 hours for 7 days               

 

                triamcinolone acetonide 0.1 % topical cream    2011      apply a thin

 layer to the affected area(s) by topical route 2 times per day     

 

                sertraline 100 mg oral tablet    4/10/2012       5/10/2012       take 1.5 tablets by oral route

 daily for 30 days                       

 

                ergocalciferol (vitamin D2) 50,000 unit oral capsule    4/15/2013       2013       TAKE

 ONE CAPSULE BY MOUTH ONCE A WEEK        

 

                CYANOCOBALAM 1000MCGINJ 1000 milliliter    2013       INJECT 1ML INTRAMUSCULAR

 ONCE A MONTH                            

 

                pravastatin 40 mg oral tablet    3/25/2014       3/20/2015       TAKE ONE TABLET BY MOUTH EVERY

 DAY                                     

 

                          Zostavax (PF) 19,400 unit/0.65 mL subcutaneous suspension for reconstitution    3/23/2015

                    3/24/2015           inject 0.65 milliliter by subcutaneous route once     

 

                famciclovir 500 mg oral tablet    12/3/2015       12/10/2015      take 1 tablet (500 mg) by

 oral route every 8 hours for 7 days     

 

                furosemide 40 mg oral tablet    2016      take 1 tablet (40 mg) by oral

 route once daily                        

 

                Cipro 500 mg oral tablet    2016       take 1 tablet (500 mg) by oral route

 2 times per day for 5 days              

 

                Bactrim -160 mg oral tablet    2016        take 1 tablet by oral route

 every 12 hours for 7 days               

 

                metoclopramide HCl 10 mg oral tablet    2017       take 1 tablet by oral

 route 2 times a day for 50 days         

 

                Macrobid 100 mg oral capsule    2017        2017       take 1 capsule (100 mg) by oral

 route 2 times per day with food for 7 days     

 

                Augmentin 875-125 mg oral tablet    2017       take 1 tablet by oral route

 every 12 hours for 7 days               

 

                amoxicillin 500 mg oral tablet    2017       take 1 tablet (500 mg) by oral

 route every 12 hours for 7 days         









                                        Discontinued 

 

             Name         Start Date    Discontinued Date    SIG          Comments

 

                Tylenol 325 mg oral tablet                    2013        take 1 - 2 tablets (325 -650 mg) by oral

 route every 4-6 hours as needed         

 

                Calcium 600 + D(3) 600 mg(1,500mg) -400 unit oral tablet                    2011       take 1 tablet

 by oral route 2 times a day            no longer taking

 

                Vitamin B-12 1,000 mcg oral tablet extended release    2010       take 1

 tablet by oral route daily             no longer taking

 

                Antifungal (clotrimazole) 1 % topical cream    2010       apply to the 

affected and surrounding areas of skin by topical route 2 times per day morning 
and evening                              

 

                sertraline 100 mg oral tablet    5/10/2011       2011       take 2 tablets (200 mg) by 

oral route once daily                   discontinued by Dr. Serrano

 

                mirtazapine 15 mg oral tablet                    2011        take 1 tablet (15 mg) by oral route 

once daily before bedtime               Dr. Serrano

 

                mirtazapine 15 mg oral tablet                    2011        take 1 tablet (15 mg) by oral route 

once daily before bedtime               dc'd by Dr. Serrano

 

                Pristiq 50 mg oral tablet extended release 24 hr                    2013        take 1 tablet (50

 mg) by oral route once daily           Dr. Zach Sarahstiq 50 mg oral tablet extended release 24 hr                    2013        take 1 tablet (50

 mg) by oral route once daily           dose updated

 

                Vitamin B-12 1,000 mcg/mL injection solution    2011        inject 1 milliliter

 (1,000 mcg) by intramuscular route once a month    on list already

 

                    syringe with needle 1 mL 25 gauge x 1" miscellaneous syringe    2011

                          use for injection once a month     

 

                clotrimazole 1 % topical cream    2011        apply to the affected and surrounding

 areas of skin by topical route 2 times per day in the morning and evening     

 

                Vitamin D2 50,000 unit oral capsule    2011        take 1 capsule (50,000

 unit) by oral route once weekly        generic on list

 

                Pravachol 40 mg oral tablet    2012        take 1 tablet (40 mg) by oral 

route once daily for 90 days            generic on list

 

                lithium carbonate 300 mg oral capsule    2012        take 1 capsule by oral

 route daily                            dose updated

 

                Pristiq 100 mg oral tablet extended release 24 hr                    4/10/2012       take 1 and 1/2 

tablet (150 mg) by oral route once daily    Mental Health provider

 

                Pristiq 100 mg oral tablet extended release 24 hr                    4/10/2012       take 1 and 1/2 

tablet (150 mg) by oral route once daily    Discontinued by Dr Efrain Knight at [facility]

 

                hydroxyzine HCl 50 mg oral tablet    10/16/2014      2015       take 1 tablet (50 mg) 

by oral route at bedtime                 

 

                lithium carbonate 300 mg oral capsule    2015       take 1 capsule (300

 mg) by oral route 2 for 30 days         

 

                fluconazole 100 mg oral tablet    2015       12/3/2015       take 1 tablet (100 mg) by 

oral route once a week                   

 

                ketoconazole 2 % topical cream    2015       12/3/2015       apply to the affected area(s)

 by topical route 2 times per day        

 

                prednisone 10 mg oral tablet    12/3/2015       2016        take 2 tablets (20 mg) by oral

 route once daily for 4 days 1 tablet daily for 4 days 0.5 tablet daily for 4 
days                                     

 

                triamcinolone acetonide 0.1 % topical cream    2016       apply a thin layer

 to the affected area(s) by topical route 2 times per day     

 

                Cipro 500 mg oral tablet    1/15/2017       2017       take 1 tablet (500 mg) by oral route

 every 12 hours for 10 days              







Problem List







                    Description         Status              Onset

 

                    Artificial opening status; colostomy    Active               

 

                    Bipolar disorder, unspecified    Active               

 

                    Hyperlipidemia      Active               

 

                    Peritoneal Neoplasm, Malignant    Active               

 

                    Anemia, Pernicious    Active               

 

                    Arthritis unspecified    Active               

 

                    B12 deficiency      Active               







Vital Signs







      Date    Time    BP-Sys(mm[Hg]    BP-Lynn(mm[Hg])    HR(bpm)    RR(rpm)    Temp    WT    HT    HC    BMI

                    BSA                 BMI Percentile      O2 Sat(%)

 

        2017    3:05:00 PM    134 mmHg    70 mmHg    70 bpm    20 rpm    97.4 F    181 lbs    69 in

                          26.73 kg/m2    2.00 m2                   98 %

 

        2017    11:07:00 AM    124 mmHg    64 mmHg    62 bpm    17 rpm    98.2 F    181.2 lbs    69

 in                       26.7583 kg/m    2.0003 m                 98 %

 

        1/15/2017    3:34:00 PM    148 mmHg    89 mmHg    69 bpm    20 rpm    98.2 F    179 lbs    69 in

                          26.43 kg/m2    1.99 m2                   98 %

 

       2017    1:51:00 PM    160 mmHg    90 mmHg    100 bpm    20 rpm    96.5 F    179 lbs             

                                                                98 %

 

       2016    3:11:00 PM    134 mmHg    76 mmHg    80 bpm    20 rpm    98 F    163 lbs    69 in     

                24.07 kg/m2     1.90 m2                         98 %

 

        2016    2:04:00 PM    142 mmHg    86 mmHg    68 bpm    16 rpm    98.5 F    166 lbs    63 in

                          29.4053 kg/m    1.8295 m                 100 %

 

        2016    11:27:00 AM    148 mmHg    78 mmHg    90 bpm    20 rpm    98.2 F    153 lbs    69 in

                          22.59 kg/m2    1.84 m2                   96 %

 

        12/3/2015    9:50:00 AM    132 mmHg    70 mmHg    62 bpm    16 rpm    97.9 F    145 lbs    69 in

                          21.4125 kg/m    1.7894 m                 100 %

 

        2015    8:52:00 AM    132 mmHg    68 mmHg    52 bpm    20 rpm    97.8 F    141 lbs    69 in

                          20.82 kg/m2    1.76 m2                   100 %

 

        2015    3:25:00 PM    120 mmHg    62 mmHg    72 bpm    16 rpm    98.1 F    136 lbs    69 in

                          20.0835 kg/m    1.733 m                 98 %

 

       3/23/2015    2:55:00 PM    130 mmHg    76 mmHg    68 bpm    18 rpm    97 F    140 lbs    69 in    

                20.67 kg/m2     1.76 m2                         98 %

 

        10/16/2014    11:11:00 AM    120 mmHg    66 mmHg    77 bpm    20 rpm    98 F    130 lbs    69 in

                          19.1974 kg/m    1.6943 m                 100 %

 

        2014    3:21:00 PM    130 mmHg    66 mmHg    63 bpm    18 rpm    97.2 F    160 lbs    69 in

                          23.6276 kg/m    1.88 m2                   99 %

 

        2013    10:35:00 AM    132 mmHg    70 mmHg    66 bpm    20 rpm    98.1 F    157 lbs    69 in

                          23.18 kg/m2    1.862 m                  

 

        2013    1:29:00 PM    132 mmHg    70 mmHg    76 bpm    18 rpm    98.2 F    166 lbs    69 in 

                          24.5137 kg/m    1.91 m2                    

 

       2013    2:46:00 PM    128 mmHg    70 mmHg    76 bpm    16 rpm    98 F    160 lbs    69 in     

                23.63 kg/m2     1.8797 m                       

 

        2011    8:49:00 AM    128 mmHg    78 mmHg    70 bpm    18 rpm    97.9 F    164 lbs    69 in

                          24.2183 kg/m    1.90 m2                    

 

     2011    1:31:00 PM    132 mmHg    68 mmHg    84 bpm         97 F    167 lbs                        

                                         

 

        2011    9:09:00 AM    128 mmHg    70 mmHg    72 bpm    18 rpm    98.2 F    163 lbs    64 in 

                          27.9786 kg/m    1.83 m2                    

 

       2011    10:01:00 AM    132 mmHg    70 mmHg    72 bpm    18 rpm    98.2 F    154 lbs             

                                                                 

 

       2011    2:47:00 PM    128 mmHg    70 mmHg    72 bpm    18 rpm    97.8 F    156 lbs             

                                                                 

 

       5/10/2011    3:16:00 PM    144 mmHg    80 mmHg    72 bpm    18 rpm    98.2 F    158 lbs             

                                                                 

 

        2011    10:11:00 AM    132 mmHg    70 mmHg    70 bpm    18 rpm    98.2 F    168 lbs    69 in

                          24.809 kg/m    1.93 m2                    

 

        4/15/2011    10:52:00 AM    110 mmHg    60 mmHg    75 bpm    16 rpm    97.5 F    172.375 lbs    

69 in                     25.46 kg/m2    1.951 m                 100 %

 

        2011    11:43:00 AM    120 mmHg    82 mmHg    75 bpm    16 rpm    97.2 F    178.5 lbs    69

 in                       26.3596 kg/m    1.99 m2                   100 %

 

        10/15/2010    1:32:00 PM    120 mmHg    70 mmHg    80 bpm    18 rpm    96.6 F    177 lbs    69 in

                          26.14 kg/m2    1.977 m                 100 %

 

        2010    3:50:00 PM    168 mmHg    100 mmHg    82 bpm    18 rpm    97.8 F    177.5 lbs    69

 in                       26.2119 kg/m    1.98 m2                   97 %

 

        2010    1:21:00 PM    140 mmHg    80 mmHg    59 bpm    16 rpm    97.6 F    173.25 lbs    69 

in                        25.58 kg/m2    1.956 m                 100 %

 

        2010    3:02:00 PM    140 mmHg    80 mmHg    61 bpm    16 rpm    97.6 F    173.125 lbs    69

 in                       25.5658 kg/m    1.96 m2                   99 %

 

        2010    1:23:00 PM    130 mmHg    80 mmHg    66 bpm    16 rpm    96.8 F    173 lbs    69 in 

                          25.55 kg/m2    1.9546 m                 100 %

 

        2010    12:58:00 PM    130 mmHg    88 mmHg    75 bpm    16 rpm    98.4 F    172.25 lbs    69

 in                       25.4366 kg/m    1.95 m2                   100 %







Social History







                    Name                Description         Comments

 

                    denies alcohol use                         

 

                    denies smoking                           

 

                    Denies illicit substance abuse                         

 

                    retired                                 direct care

 

                    Single                                   

 

                    Exercises regularly                         

 

                    Attended some college                         

 

                    Tobacco             Never smoker         







History of Procedures







                    Date Ordered        Description         Order Status

 

                    2010 12:00 AM    COMPREHEN METABOLIC PANEL    Reviewed

 

                    2010 12:00 AM    COMPLETE CBC W/AUTO DIFF WBC    Reviewed

 

                    2010 12:00 AM    LIPID PANEL         Reviewed

 

                          2015 12:00 AM        B12 Injection, Up to 1000 Mcg NDC#6587-7225-50 [facility] 

                                        Reviewed

 

                    2011 12:00 AM    MAMMOGRAM SCREENING    Reviewed

 

                    2011 12:00 AM    CYTOPATH C/V THIN LAYER    Reviewed

 

                    2011 12:00 AM    B12 Injection 1 cc NDC#[address]-48    Reviewed

 

                    2015 12:00 AM    THER/PROPH/DIAG INJ SC/IM    Reviewed

 

                    2015 12:00 AM    B12 Injection, Up to 1000 Mcg NDC#1653-5132-99    Reviewed

 

                    2011 12:00 AM    THER/PROPH/DIAG INJ SC/IM    Reviewed

 

                    2011 12:00 AM    B12 Injection([place]) Ndc#0475-9943-67-    Reviewed

 

                    2015 12:00 AM    THER/PROPH/DIAG INJ SC/IM    Reviewed

 

                    2015 12:00 AM    B12 Injection, Up to 1000 Mcg NDC#3595-1181-34    Reviewed

 

                    10/20/2011 12:00 AM    THER/PROPH/DIAG INJ SC/IM    Reviewed

 

                    10/20/2011 12:00 AM    B12 Injection([place]ella) Ndc#5154-8218-67-    Reviewed

 

                    2016 12:00 AM    THER/PROPH/DIAG INJ SC/IM    Reviewed

 

                    2016 12:00 AM    B12 Injection, Up to 1000 Mcg NDC#1354-0275-76    Reviewed

 

                    3/14/2016 12:00 AM    VITAMIN B-12        Reviewed

 

                    3/15/2016 12:00 AM    THER/PROPH/DIAG INJ SC/IM    Reviewed

 

                    3/15/2016 12:00 AM    B12 Injection, Up to 1000 Mcg NDC#5277-0383-08    Reviewed

 

                    2011 12:00 AM    ***Immunization administration, Medicare flu    Reviewed

 

                    2011 12:00 AM    Fluzone ** MEDICARE Only **    Reviewed

 

                    2011 12:00 AM    THER/PROPH/DIAG INJ SC/IM    Reviewed

 

                    2011 12:00 AM    B12 Injection ([employer] [facility]) Ndc#2929-4162-88    Reviewed

 

                    2016 12:00 AM    B12 Injection, Up to 1000 Mcg NDC#3911-7154-07 [facility]    

Reviewed

 

                    2016 12:00 AM    TTE W/DOPPLER COMPLETE    Reviewed

 

                    2016 12:00 AM    EXTREMITY STUDY     Reviewed

 

                          2016 12:00 AM        B12 Injection, Up to 1000 Mcg NDC#5453-6130-50 [facility] 

                                        Reviewed

 

                    2016 12:00 AM    THER/PROPH/DIAG INJ SC/IM    Reviewed

 

                    2016 12:00 AM    B12 Injection, Up to 1000 Mcg NDC#4796-6915-88    Reviewed

 

                    2016 12:00 AM    THER/PROPH/DIAG INJ SC/IM    Reviewed

 

                    2012 12:00 AM    B12 Injection([place]) Ndc#0885-7991-71-    Reviewed

 

                    2016 12:00 AM    B12 Injection, Up to 1000 Mcg NDC#5108-2124-28    Reviewed

 

                    2016 12:00 AM    THER/PROPH/DIAG INJ SC/IM    Reviewed

 

                    2012 12:00 AM    THER/PROPH/DIAG INJ SC/IM    Reviewed

 

                    2012 12:00 AM    B12 Injection ([employer]) Ndc#5999-6383-69    Reviewed

 

                    2016 12:00 AM    THER/PROPH/DIAG INJ SC/IM    Reviewed

 

                    2016 12:00 AM    B12 Injection, Up to 1000 Mcg NDC#7785-4199-60    Reviewed

 

                    2016 12:00 AM    B12 Injection, Up to 1000 Mcg NDC#8440-9254-31    Reviewed

 

                    2016 12:00 AM    THER/PROPH/DIAG INJ SC/IM    Reviewed

 

                    2012 12:00 AM    THER/PROPH/DIAG INJ SC/IM    Reviewed

 

                    2012 12:00 AM    B12 Injection([place]) Ndc#7875-6793-75-    Reviewed

 

                    12/15/2016 12:00 AM    B12 Injection, Up to 1000 Mcg NDC#1525-6489-79    Reviewed

 

                    12/15/2016 12:00 AM    THER/PROPH/DIAG INJ SC/IM    Reviewed

 

                    2016 12:00 AM    URNLS DIP STICK/TABLET RGNT AUTO W/O MICROSCOPY    Returned

 

                    1/3/2017 12:00 AM    URNLS DIP STICK/TABLET RGNT AUTO W/O MICROSCOPY    Returned

 

                    2017 12:00 AM    URINE CULTURE/COLONY COUNT    Returned

 

                    2017 12:00 AM    Rocephin 1 gram Injection, [facility]    Reviewed

 

                    2017 12:00 AM    THERAPEUTIC PROPHYLACTIC/DX INJECTION SUBQ/IM    Reviewed

 

                    2017 12:00 AM    B12 1000mcg Injection, [facility]    Reviewed

 

                    5/3/2012 12:00 AM    THER/PROPH/DIAG INJ SC/IM    Reviewed

 

                    5/3/2012 12:00 AM    B12 Injection([place]ella) Ndc#5490-3515-58-    Reviewed

 

                    2017 12:00 AM    THERAPEUTIC PROPHYLACTIC/DX INJECTION SUBQ/IM    Reviewed

 

                    2017 12:00 AM    B12 1000mcg Injection, [facility]    Reviewed

 

                    2017 12:00 AM    MRI BRAIN STEM W/O & W/DYE    Reviewed

 

                    2017 12:00 AM    VITAMIN B-12        Reviewed

 

                    2017 12:00 AM    Speech Therapy Consult    Reviewed

 

                    2017 12:00 AM    ASSAY OF CREATININE    Reviewed

 

                    2012 12:00 AM    IMMUNOTHERAPY INJECTIONS    Reviewed

 

                    2012 12:00 AM    B12 Injection([place]ella) Ndc#2757-2243-39-    Reviewed

 

                    2017 12:00 AM    URINALYSIS AUTO W/SCOPE    Returned

 

                    2012 12:00 AM    THER/PROPH/DIAG INJ SC/IM    Reviewed

 

                    2012 12:00 AM    B12 Injection, Up to 1000 Mcg NDC#2217-8275-55    Reviewed

 

                    2012 12:00 AM    THER/PROPH/DIAG INJ SC/IM    Reviewed

 

                    2012 12:00 AM    B12 Injection, Up to 1000 Mcg NDC#9601-0429-81    Reviewed

 

                    2012 12:00 AM    THER/PROPH/DIAG INJ SC/IM    Reviewed

 

                    2012 12:00 AM    B12 Injection, Up to 1000 Mcg NDC#4161-1410-46    Reviewed

 

                    10/16/2012 12:00 AM    THER/PROPH/DIAG INJ SC/IM    Reviewed

 

                    10/16/2012 12:00 AM    B12 Injection, Up to 1000 Mcg NDC#3709-7574-14    Reviewed

 

                    2010 12:00 AM    COMPREHEN METABOLIC PANEL    Reviewed

 

                    2010 12:00 AM    COMPLETE CBC W/AUTO DIFF WBC    Reviewed

 

                    2010 12:00 AM    LIPID PANEL         Reviewed

 

                    2013 12:00 AM    Flu Injection 3 Years And Above NDC# [address]-54  [facility]    Reviewed



 

                    2013 12:00 AM    COMPLETE CBC W/AUTO DIFF WBC    Reviewed

 

                    2013 12:00 AM    ASSAY OF LITHIUM    Reviewed

 

                    2013 12:00 AM    METABOLIC PANEL TOTAL CA    Reviewed

 

                    4/3/2013 12:00 AM    THER/PROPH/DIAG INJ SC/IM    Reviewed

 

                    4/3/2013 12:00 AM    B12 Injection, Up to 1000 Mcg NDC#4617-1265-47    Reviewed

 

                    2013 12:00 AM    THER/PROPH/DIAG INJ SC/IM    Reviewed

 

                    2013 12:00 AM    B12 Injection, Up to 1000 Mcg NDC#0497-0244-38    Reviewed

 

                    2013 12:00 AM    THER/PROPH/DIAG INJ SC/IM    Reviewed

 

                    2013 12:00 AM    B12 Injection, Up to 1000 Mcg NDC#7538-9083-16    Reviewed

 

                    2013 12:00 AM    LIPID PANEL         Reviewed

 

                    2013 12:00 AM    VITAMIN D 25 HYDROXY    Reviewed

 

                    2013 12:00 AM    THER/PROPH/DIAG INJ SC/IM    Reviewed

 

                    2013 12:00 AM    B12 Injection, Up to 1000 Mcg NDC#3077-5525-22    Reviewed

 

                    2013 12:00 AM    THER/PROPH/DIAG INJ SC/IM    Reviewed

 

                    3/6/2014 12:00 AM    THER/PROPH/DIAG INJ SC/IM    Reviewed

 

                    2014 12:00 AM    THER/PROPH/DIAG INJ SC/IM    Reviewed

 

                    2014 12:00 AM    B12 Injection, Up to 1000 Mcg NDC#7190-9132-82    Reviewed

 

                    2010 12:00 AM    SKIN FUNGI CULTURE    Reviewed

 

                    10/9/2010 12:00 AM    COMPREHEN METABOLIC PANEL    Reviewed

 

                    10/9/2010 12:00 AM    LIPID PANEL         Reviewed

 

                    2010 12:00 AM    THER/PROPH/DIAG INJ SC/IM    Reviewed

 

                    2010 12:00 AM    B12 Injection Ndc#[address]-53 ([place])    Reviewed

 

                    2010 12:00 AM    THER/PROPH/DIAG INJ SC/IM    Reviewed

 

                    2010 12:00 AM    Kenalog 40 Mg Im-Ndc#[address]-22 ([place])    Reviewed

 

                    10/15/2010 12:00 AM    FLU VACCINE 3 YRS & > IM    Reviewed

 

                    10/15/2010 12:00 AM    Admin.Of M/C Cov.Vaccine-Flu Vacc.    Reviewed

 

                    1/15/2011 12:00 AM    COMPLETE CBC W/AUTO DIFF WBC    Reviewed

 

                    1/15/2011 12:00 AM    COMPREHEN METABOLIC PANEL    Reviewed

 

                    1/15/2011 12:00 AM    LIPID PANEL         Reviewed

 

                    2014 12:00 AM    MAMMOGRAM SCREENING    Reviewed

 

                    2014 12:00 AM    Screening mammography, bilateral    Reviewed

 

                    7/10/2014 12:00 AM    THER/PROPH/DIAG INJ SC/IM    Reviewed

 

                    7/10/2014 12:00 AM    B12 Injection, Up to 1000 Mcg NDC#0847-8823-36    Reviewed

 

                    2011 12:00 AM    COMPLETE CBC W/AUTO DIFF WBC    Reviewed

 

                    2011 12:00 AM    COMPREHEN METABOLIC PANEL    Reviewed

 

                    2011 12:00 AM    LIPID PANEL         Reviewed

 

                    2014 12:00 AM    B12 Injection, Up to 1000 Mcg NDC#8412-1613-21    Reviewed

 

                    10/19/2014 12:00 AM    MAMMOGRAM SCREENING    Reviewed

 

                    10/19/2014 12:00 AM    Screening mammography, bilateral    Reviewed

 

                    10/16/2014 12:00 AM    B12 Injection, Up to 1000 Mcg NDC#0986-3709-61    Reviewed

 

                    10/16/2014 12:00 AM    COMPLETE CBC W/AUTO DIFF WBC    Reviewed

 

                    10/16/2014 12:00 AM    COMPREHEN METABOLIC PANEL    Reviewed

 

                    10/16/2014 12:00 AM    IMMUNOASSAY TUMOR     Reviewed

 

                    10/16/2014 12:00 AM    LIPID PANEL         Reviewed

 

                    10/16/2014 12:00 AM    ASSAY OF LITHIUM    Reviewed

 

                    10/16/2014 12:00 AM    MAMMOGRAM SCREENING    Reviewed

 

                    2011 12:00 AM    ASSAY OF PARATHORMONE    Reviewed

 

                    2011 12:00 AM    VITAMIN D 25 HYDROXY    Reviewed

 

                    2011 12:00 AM    ASSAY OF LITHIUM    Reviewed

 

                    2011 12:00 AM    METABOLIC PANEL TOTAL CA    Reviewed

 

                    2011 12:00 AM    CT HEAD/BRAIN W/O & W/DYE    Reviewed

 

                    3/23/2015 12:00 AM    PNEUMOCOCCAL VACC 13 GLENDY IM    Reviewed

 

                    3/23/2015 12:00 AM    Vitamin B12 injection    Reviewed

 

                    2011 12:00 AM    ASSAY OF LITHIUM    Reviewed

 

                    2011 12:00 AM    B12 Injection Ndc#[address]-20  [place]    Reviewed

 

                    2015 12:00 AM    THER/PROPH/DIAG INJ SC/IM    Reviewed

 

                    2015 12:00 AM    B12 Injection, Up to 1000 Mcg NDC#3592-2894-53    Reviewed

 

                    2015 12:00 AM    COMPLETE CBC W/AUTO DIFF WBC    Reviewed

 

                    2015 12:00 AM    COMPREHEN METABOLIC PANEL    Reviewed

 

                    2015 12:00 AM    LIPID PANEL         Reviewed

 

                    2015 12:00 AM    ASSAY OF LITHIUM    Reviewed

 

                    2011 12:00 AM    VIT D 1 25-DIHYDROXY    Reviewed

 

                    2011 12:00 AM    VITAMIN B-12        Reviewed

 

                    2015 12:00 AM    B12 Injection, Up to 1000 Mcg NDC#0286-7449-83    Reviewed

 

                    2015 12:00 AM    THER/PROPH/DIAG INJ SC/IM    Reviewed

 

                    2015 12:00 AM    B12 Injection, Up to 1000 Mcg NDC#9518-1785-72    Reviewed

 

                    2011 12:00 AM    THER/PROPH/DIAG INJ SC/IM    Reviewed

 

                    2011 12:00 AM    B12 Injection ([employer] [facility]) Ndc#2448-6279-01    Reviewed

 

                    2015 12:00 AM    THER/PROPH/DIAG INJ SC/IM    Reviewed

 

                    2015 12:00 AM    B12 Injection, Up to 1000 Mcg NDC#9026-3931-20    Reviewed







Results Summary







                          Data and Description      Results

 

                          2004 12:00 AM        Colonoscopy-Women and Men over 50 Normal 

 

                          2008 12:00 AM         Pap Smear Declined 

 

                          10/7/2009 12:00 AM        Cholest Cry Stone Ql .0 %LDLc SerPl-mCnc 123.0 mg/dLHDLc

 SerPl-mCnc 34.0 mg/dLTrigl SerPl-mCnc 190.0 mg/dLGlucose SerPl-mCnc 78.0 mg/dL

 

                          2009 12:00 AM        Mammogram -Women over 40 Normal HIV1+2 Ab Ser Ql no risk 

 

                          2010 8:47 AM         Dexa Bone Scan Refused Aspirin reccommended Contraindication 



 

                          2010 8:48 AM         Depression Done 

 

                          2010 12:00 AM         Foot Exam-Diabetic Done 

 

                          2010 12:00 AM         Cholest Cry Stone Ql .0 %LDLc SerPl-mCnc 126.0 mg/dLGlucose

 SerPl-mCnc 102.0 mg/dL

 

                          2010 8:45 AM          TRIGLYCERIDES 122.0 mg/dLCHOLESTEROL 186.0 mg/dLHDL 36.0 mg/dLTOT

 CHOL/HDL 5.2 LDL (CALC) 126.0 mg/dLGLUCOSE 102.0 mg/dLSODIUM 143.0 
mmol/LPOTASSIUM 3.70 mmol/LCHLORIDE 111.0 mmol/LCO2 23.0 mmol/LBUN 10.0 
mg/dLCREATININE 0.80 mg/dLSGOT/AST 12.0 IU/LSGPT/ALT 11.0 IU/LALK PHOS 65.0 
IU/LTOTAL PROTEIN 7.20 g/dLALBUMIN 3.90 g/dLTOTAL BILI 0.50 mg/dLCALCIUM 10.20 
mg/dLAGE 59 GFR NonAA 73 GFR AA 88 eGFR >60 mL/min/1.73 m2eGFR AA* >60 WBC 5.7 
RBC 3.26 HGB 10.60 g/dLHCT 31.70 %MCV 97.0 fLMCH 32.50 pgMCHC 33.40 g/dLRDW SD 
47 RDW CV 13.30 %MPV 9.70 fLPLT 287 NRBC# 0.00 NRBC% 0.0 %NEUT 62.90 %%LYMP 
21.80 %%MONO 9.90 %%EOS 5.0 %%BASO 0.40 %#NEUT 3.56 #LYMP 1.23 #MONO 0.56 #EOS 
0.28 #BASO 0.02 MANUAL DIFF NOT IND 

 

                          2010 12:00 AM        Glucose SerPl-mCnc 96.0 mg/dLCholest Cry Stone Ql .0 %LDLc

 SerPl-mCnc 146.0 mg/dL

 

                          2010 8:26 AM         TRIGLYCERIDES 106.0 mg/dLCHOLESTEROL 199.0 mg/dLHDL 32.0 mg/dLTOT

 CHOL/HDL 6.2 LDL (CALC) 146.0 mg/dLGLUCOSE 96.0 mg/dLSODIUM 143.0 
mmol/LPOTASSIUM 4.0 mmol/LCHLORIDE 113.0 mmol/LCO2 24.0 mmol/LBUN 13.0 
mg/dLCREATININE 1.0 mg/dLSGOT/AST 11.0 IU/LSGPT/ALT 6.0 IU/LALK PHOS 56.0 
IU/LTOTAL PROTEIN 6.60 g/dLALBUMIN 3.80 g/dLTOTAL BILI 0.50 mg/dLCALCIUM 9.30 
mg/dLAGE 59 GFR NonAA 57 GFR AA 69 eGFR 57 eGFR AA* >60 

 

                          10/6/2010 12:00 AM        Cholest Cry Stone Ql .0 %LDLc SerPl-mCnc 111.0 mg/dLGlucose

 SerPl-mCnc 81.0 mg/dL

 

                          10/6/2010 2:45 PM         TRIGLYCERIDES 123.0 mg/dLCHOLESTEROL 178.0 mg/dLHDL 42.0 mg/dLTOT

 CHOL/HDL 4.2 LDL (CALC) 111.0 mg/dLGLUCOSE 81.0 mg/dLSODIUM 139.0 
mmol/LPOTASSIUM 4.10 mmol/LCHLORIDE 106.0 mmol/LCO2 24.0 mmol/LBUN 13.0 
mg/dLCREATININE 0.90 mg/dLSGOT/AST 13.0 IU/LSGPT/ALT 11.0 IU/LALK PHOS 61.0 
IU/LTOTAL PROTEIN 7.10 g/dLALBUMIN 3.90 g/dLTOTAL BILI 0.30 mg/dLCALCIUM 9.30 
mg/dLAGE 60 GFR NonAA 64 GFR AA 78 eGFR >60 mL/min/1.73 m2eGFR AA* >60 WBC 6.9 
RBC 3.59 HGB 11.50 g/dLHCT 35.30 %MCV 98.0 fLMCH 32.0 pgMCHC 32.60 g/dLRDW SD 46
 RDW CV 12.90 %MPV 9.90 fLPLT 311 NRBC# 0.00 NRBC% 0.0 %NEUT 64.90 %%LYMP 22.50 
%%MONO 7.20 %%EOS 5.10 %%BASO 0.30 %#NEUT 4.45 #LYMP 1.54 #MONO 0.49 #EOS 0.35 
#BASO 0.02 MANUAL DIFF NOT IND 

 

                          2011 12:00 AM         Mammogram -Women over 40 Ordered 

 

                          2011 10:25 AM        TRIGLYCERIDES 111.0 mg/dLCHOLESTEROL 195.0 mg/dLHDL 43.0 mg/dLTOT

 CHOL/HDL 4.5 LDL (CALC) 130.0 mg/dLWBC 5.3 RBC 3.76 HGB 12.0 g/dLHCT 37.80 %MCV
 101.0 fLMCH 31.90 pgMCHC 31.70 g/dLRDW SD 47 RDW CV 13.0 %MPV 9.70 fLPLT 259 
NRBC# 0.00 NRBC% 0.0 %NEUT 69.0 %%LYMP 17.60 %%MONO 8.30 %%EOS 4.70 %%BASO 0.40 
%#NEUT 3.63 #LYMP 0.93 #MONO 0.44 #EOS 0.25 #BASO 0.02 MANUAL DIFF NOT IND 
GLUCOSE 102.0 mg/dLSODIUM 146.0 mmol/LPOTASSIUM 4.20 mmol/LCHLORIDE 113.0 
mmol/LCO2 23.0 mmol/LBUN 15.0 mg/dLCREATININE 1.0 mg/dLSGOT/AST 12.0 
IU/LSGPT/ALT 17.0 IU/LALK PHOS 60.0 IU/LTOTAL PROTEIN 6.90 g/dLALBUMIN 4.20 
g/dLTOTAL BILI 0.40 mg/dLCALCIUM 9.70 mg/dLAGE 60 GFR NonAA 57 GFR AA 69 eGFR 57
 eGFR AA* >60 

 

                          2011 11:49 AM        Cholest Cry Stone Ql .0 %LDLc SerPl-mCnc 130.0 mg/dLHDLc

 SerPl-mCnc 43.0 mg/dLTrigl SerPl-mCnc 111.0 mg/dLGlucose SerPl-mCnc 102.0 mg/dL

 

                          2011 11:52 AM        Pap Smear Declined 

 

                          2011 11:28 AM        Lithium 2.080 mmol/LGLUCOSE 102.0 mg/dLSODIUM 135.0 mmol/LPOTASSIUM

 3.90 mmol/LCHLORIDE 106.0 mmol/LCO2 21.0 mmol/LBUN 12.0 mg/dLCREATININE 1.30 
mg/dLCALCIUM 10.70 mg/dLAGE 60 GFR NonAA 42 GFR AA 51 eGFR 42 eGFR AA* 51 

 

                          2011 8:58 AM          Lithium 0.690 mmol/L

 

                          2011 2:38 PM         VITAMIN B12 3483.0 pg/mL

 

                          2013 3:35 PM          WBC 5.1 RBC 3.73 HGB 11.70 g/dLHCT 36.40 %MCV 98.0 fLMCH 31.40

 pgMCHC 32.10 g/dLRDW SD 47 RDW CV 13.10 %MPV 9.80 fLPLT 224 NRBC# 0.00 NRBC% 
0.0 %NEUT 66.80 %%LYMP 19.10 %%MONO 9.0 %%EOS 4.90 %%BASO 0.20 %#NEUT 3.42 #LYMP
 0.98 #MONO 0.46 #EOS 0.25 #BASO 0.01 MANUAL DIFF NOT IND GLUCOSE 88.0 
mg/dLSODIUM 141.0 mmol/LPOTASSIUM 4.10 mmol/LCHLORIDE 110.0 mmol/LCO2 22.0 
mmol/LBUN 22.0 mg/dLCREATININE 1.10 mg/dLCALCIUM 9.80 mg/dLAGE 62 GFR NonAA 50 
GFR AA 61 eGFR 50 eGFR AA* 60 Lithium 0.760 mmol/L

 

                          2013 11:02 AM        TRIGLYCERIDES 106.0 mg/dLCHOLESTEROL 181.0 mg/dLHDL 46.0 mg/dLTOT

 CHOL/HDL 3.9 LDL (CALC) 114.0 mg/dLVITAMIN D 41.10 ng/mL

 

                          10/17/2014 10:10 AM       WBC 5.0 RBC 3.66 HGB 11.60 g/dLHCT 36.80 %.0 fLMCH 31.70

 pgMCHC 31.50 g/dLRDW SD 50 RDW CV 13.50 %MPV 10.10 fLPLT 209 NRBC# 0.00 NRBC% 
0.0 %NEUT 69.20 %%LYMP 21.0 %%MONO 6.40 %%EOS 3.20 %%BASO 0.20 %#NEUT 3.46 #LYMP
 1.05 #MONO 0.32 #EOS 0.16 #BASO 0.01 MANUAL DIFF NOT IND GLUCOSE 100.0 
mg/dLSODIUM 148.0 mmol/LPOTASSIUM 3.90 mmol/LCHLORIDE 114.0 mmol/LCO2 26.0 
mmol/LBUN 12.0 mg/dLCREATININE 1.20 mg/dLSGOT/AST 9.0 IU/LSGPT/ALT <6 IU/LALK 
PHOS 82.0 IU/LTOTAL PROTEIN 6.90 g/dLALBUMIN 4.0 g/dLTOTAL BILI 0.40 
mg/dLCALCIUM 10.50 mg/dLAGE 64 GFR NonAA 45 GFR AA 55 eGFR 45 eGFR AA* 55 
TRIGLYCERIDES 96.0 mg/dLCHOLESTEROL 155.0 mg/dLHDL 38.0 mg/dLTOT CHOL/HDL 4.1 
LDL (CALC) 98.0 mg/dLLithium 0.850 mmol/LCancer Antigen (CA) 125 8.30 U/mL

 

                          2015 10:25 AM        Lithium 0.790 mmol/LWBC 4.8 RBC 3.44 HGB 11.0 g/dLHCT 35.20 

%.0 fLMCH 32.0 pgMCHC 31.30 g/dLRDW SD 53 RDW CV 14.0 %MPV 9.30 fLPLT 210
 NRBC# 0.00 NRBC% 0.0 %NEUT 70.80 %%LYMP 17.20 %%MONO 8.10 %%EOS 3.50 %%BASO 
0.40 %#NEUT 3.41 #LYMP 0.83 #MONO 0.39 #EOS 0.17 #BASO 0.02 MANUAL DIFF NOT IND 
TRIGLYCERIDES 107.0 mg/dLCHOLESTEROL 174.0 mg/dLHDL 43.0 mg/dLTOT CHOL/HDL 4.0 
LDL (CALC) 110.0 mg/dLGLUCOSE 90.0 mg/dLSODIUM 145.0 mmol/LPOTASSIUM 3.80 
mmol/LCHLORIDE 115.0 mmol/LCO2 24.0 mmol/LBUN 17.0 mg/dLCREATININE 1.30 
mg/dLSGOT/AST 18.0 IU/LSGPT/ALT 17.0 IU/LALK PHOS 56.0 IU/LTOTAL PROTEIN 6.70 
g/dLALBUMIN 3.90 g/dLTOTAL BILI 0.40 mg/dLCALCIUM 9.80 mg/dLAGE 64 GFR NonAA 41 
GFR AA 50 eGFR 41 eGFR AA* 50 

 

                          2015 8:50 AM        WBC 5.8 RBC 3.29 HGB 10.70 g/dLHCT 34.0 %.0 fLMCH 32.50

 pgMCHC 31.50 g/dLRDW SD 52 RDW CV 13.60 %MPV 9.60 fLPLT 223 NRBC# 0.00 NRBC% 
0.0 %NEUT 69.60 %%LYMP 18.90 %%MONO 8.50 %%EOS 2.80 %%BASO 0.20 %#NEUT 4.03 
#LYMP 1.09 #MONO 0.49 #EOS 0.16 #BASO 0.01 MANUAL DIFF NOT IND Lithium 0.620 
mmol/LGLUCOSE 83.0 mg/dLSODIUM 139.0 mmol/LPOTASSIUM 3.90 mmol/LCHLORIDE 109.0 
mmol/LCO2 22.0 mmol/LBUN 19.0 mg/dLCREATININE 1.40 mg/dLSGOT/AST 19.0 
IU/LSGPT/ALT 21.0 IU/LALK PHOS 55.0 IU/LTOTAL PROTEIN 6.50 g/dLALBUMIN 3.90 
g/dLTOTAL BILI 0.50 mg/dLCALCIUM 9.60 mg/dLAGE 65 GFR NonAA 38 GFR AA 46 eGFR 38
 eGFR AA* 46 TRIGLYCERIDES 121.0 mg/dLCHOLESTEROL 192.0 mg/dLHDL 51.0 mg/dLTOT 
CHOL/HDL 3.8 .0 mg/dLFREE T4 0.79 TSH 1.210 uIU/mLHemoglobin A1c 5.40 
%Estim. Avg Glu (eAG) 108 

 

                          3/15/2016 8:08 AM         VITAMIN B12 696.0 pg/mL

 

                          3/23/2016 8:26 AM         WBC 7.0 RBC 3.61 HGB 11.80 g/dLHCT 37.70 %.0 fLMCH 32.70

 pgMCHC 31.30 g/dLRDW SD 49 RDW CV 12.50 %MPV 10.0 fLPLT 207 NRBC# 0.00 NRBC% 
0.0 %NEUT 73.60 %%LYMP 16.40 %%MONO 6.60 %%EOS 3.0 %%BASO 0.30 %#NEUT 5.15 #LYMP
 1.15 #MONO 0.46 #EOS 0.21 #BASO 0.02 MANUAL DIFF NOT IND Lithium 0.940 
mmol/LGLUCOSE 108.0 mg/dLSODIUM 143.0 mmol/LPOTASSIUM 4.30 mmol/LCHLORIDE 110.0 
mmol/LCO2 27.0 mmol/LBUN 16.0 mg/dLCREATININE 1.60 mg/dLSGOT/AST 13.0 
IU/LSGPT/ALT 7.0 IU/LALK PHOS 71.0 IU/LTOTAL PROTEIN 6.80 g/dLALBUMIN 4.0 
g/dLTOTAL BILI 0.20 mg/dLCALCIUM 10.40 mg/dLAGE 65 GFR NonAA 32 GFR AA 39 eGFR 
32 eGFR AA* 39 TRIGLYCERIDES 113.0 mg/dLCHOLESTEROL 169.0 mg/dLHDL 42.0 mg/dLTOT
 CHOL/HDL 4.0 LDL (CALC) 104.0 mg/dLFREE T4 0.86 TSH 2.20 uIU/mLHemoglobin A1c 
5.20 %Estim. Avg Glu (eAG) 103 

 

                          3/25/2016 9:17 AM         COLOR YELLOW APPEARANCE CLEAR SPEC GRAV 1.010 pH 7.0 PROTEIN 

NEGATIVE GLUCOSE NEGATIVE mg/dLKETONE NEGATIVE BILIRUBIN NEGATIVE BLOOD NEGATIVE
 NITRITE NEGATIVE [place] SCREEN SMALL MICRO IND? SEE BELOW WBC/HPF 0-5 RBC/HPF 
NEGATIVE CASTS/LPF NEGATIVE /LPFCRYSTALS NEGATIVE MUCOUS THRDS NEGATIVE BACTERIA
 NEGATIVE EPITH CELLS FEW SQUAMOUS /HPFTRICHOMONAS NEGATIVE YEAST NEGATIVE 

 

                          2016 6:00 AM        GLUCOSE 91.0 mg/dLSODIUM 143.0 mmol/LPOTASSIUM 3.60 mmol/LCHLORIDE

 112.0 mmol/LCO2 23.0 mmol/LBUN 22.0 mg/dLCREATININE 1.20 mg/dLSGOT/AST 15.0 
IU/LSGPT/ALT 12.0 IU/LALK PHOS 61.0 IU/LTOTAL PROTEIN 5.40 g/dLALBUMIN 3.10 
g/dLTOTAL BILI 0.40 mg/dLCALCIUM 8.40 mg/dLAGE 66 GFR NonAA 45 GFR AA 55 eGFR 45
 eGFR AA* 55 WBC 3.0 RBC 3.05 HGB 9.80 g/dLHCT 32.10 %.0 fLMCH 32.10 
pgMCHC 30.50 g/dLRDW SD 54 RDW CV 14.20 %MPV 10.10 fLPLT 170 NRBC# 0.00 NRBC% 
0.0 %NEUT 50.70 %%LYMP 32.60 %%MONO 10.50 %%EOS 5.90 %%BASO 0.30 %#NEUT 1.54 
#LYMP 0.99 #MONO 0.32 #EOS 0.18 #BASO 0.01 MANUAL DIFF NOT IND 

 

                          2016 2:09 PM        COLOR YELLOW APPEARANCE CLEAR SPEC GRAV 1.010 pH 5.0 PROTEIN

 30 GLUCOSE NEGATIVE mg/dLKETONE NEGATIVE BILIRUBIN NEGATIVE BLOOD LARGE NITRITE
 NEGATIVE [place] SCREEN MODERATE MICRO INDICATED? SEE BELOW WBC/HPF  RBC/HPF
 20-50 CASTS/LPF NEGATIVE /LPFCRYSTALS NEGATIVE MUCOUS THRDS NEGATIVE BACTERIA 
NEGATIVE EPITH CELLS FEW SQUAMOUS /HPFTRICHOMONAS NEGATIVE YEAST NEGATIVE CULT 
SET UP? YES 

 

                          1/3/2017 4:08 PM          COLOR YELLOW APPEARANCE HAZY SPEC GRAV 1.015 pH 6.0 PROTEIN 30

 GLUCOSE NEGATIVE mg/dLKETONE NEGATIVE BILIRUBIN NEGATIVE BLOOD MODERATE NITRITE
 NEGATIVE [place] SCREEN LARGE MICRO INDICATED? SEE BELOW WBC/-200 RBC/HPF 
5-10 CASTS/LPF NEGATIVE /LPFCRYSTALS NEGATIVE MUCOUS THRDS NEGATIVE BACTERIA 
NEGATIVE EPITH CELLS 1+ SQUAMOUS /HPFTRICHOMONAS NEGATIVE YEAST NEGATIVE CULT 
SET UP? YES 

 

                          2017 4:24 PM         CREATININE 1.50 mg/dLAGE 66 GFR NonAA 35 GFR AA 42 eGFR 35 eGFR

 AA* 42 VITAMIN B12 1324.0 pg/mL

 

                          2017 11:30 AM         GLUCOSE 93.0 mg/dLSODIUM 143.0 mmol/LPOTASSIUM 3.10 mmol/LCHLORIDE

 101.0 mmol/LCO2 29.0 mmol/LBUN 26.0 mg/dLCREATININE 1.50 mg/dLSGOT/AST 23.0 
IU/LSGPT/ALT 13.0 IU/LALK PHOS 66.0 IU/LTOTAL PROTEIN 7.70 g/dLALBUMIN 4.30 
g/dLTOTAL BILI 0.40 mg/dLCALCIUM 10.30 mg/dLAGE 66 GFR NonAA 35 GFR AA 42 eGFR 
35 eGFR AA* 42 TRIGLYCERIDES 147.0 mg/dLCHOLESTEROL 184.0 mg/dLHDL 44.0 mg/dLTOT
 CHOL/HDL 4.2 .0 mg/dLWBC 5.4 RBC 3.98 HGB 12.90 g/dLHCT 40.20 %.0
 fLMCH 32.40 pgMCHC 32.10 g/dLRDW SD 50 RDW CV 13.50 %MPV 9.30 fLPLT 210 NRBC# 
0.00 NRBC% 0.0 %NEUT 54.20 %%LYMP 30.70 %%MONO 9.10 %%EOS 5.20 %%BASO 0.40 
%#NEUT 2.94 #LYMP 1.66 #MONO 0.49 #EOS 0.28 #BASO 0.02 MANUAL DIFF NOT IND FREE 
T4 1.09 COLOR YELLOW APPEARANCE CLEAR SPEC GRAV <=1.005 pH 5.5 PROTEIN NEGATIVE 
GLUCOSE NEGATIVE mg/dLKETONE NEGATIVE BILIRUBIN NEGATIVE BLOOD NEGATIVE NITRITE 
NEGATIVE [place] SCREEN LARGE MICRO INDICATED? SEE BELOW WBC/HPF 10-20 RBC/HPF 0-5 
CASTS/LPF NEGATIVE /LPFCRYSTALS NEGATIVE MUCOUS THRDS NEGATIVE BACTERIA FEW 
EPITH CELLS 1+ SQUAMOUS /HPFTRICHOMONAS NEGATIVE YEAST NEGATIVE CULT SET UP? YES
 TSH 1.820 uIU/mL







History Of Immunizations







       Name    Date Admin    Mfg Name    Mfg Code    Trade Name    Lot#    Route    Inj    Vis Given    Vis

 Pub                                    CVX

 

        Influenza    2008    Not Entered    NE      Not Entered            Not Entered    Not Entered

                    1            999

 

        X       12/19/2008    [employer]    MSD     Pneumovax 23            Intramuscular    Not Entered

                    1            999

 

           Influenza    10/15/2010    [place] [facility]    NOV        Fluvirin > 12 Years    

[id number]     Intramuscular    Left Deltoid    10/15/2010    2009    999

 

          X         3/23/2015    [facility]       Prevnar 13    [id number]    Intramuscular

                Right Gluteous Medius    3/23/2015       2013       109







History of Past Illness







                    Name                Date of Onset       Comments

 

                    Peritoneal Neoplasm, Malignant                         

 

                    Hyperlipidemia                           

 

                    Bipolar disorder, unspecified                         

 

                    Artificial opening status; colostomy                         

 

                    B12 deficiency                           

 

                    Anemia, Pernicious                         

 

                    Arthritis unspecified                         

 

                    cervical cancer                          

 

                    Artificial opening status; colostomy    2010  1:10PM     

 

                    Bipolar disorder, unspecified    2010  1:10PM     

 

                    Hyperlipidemia      2010  1:10PM     

 

                    Anemia, Pernicious    2010  1:10PM     

 

                    Postoperative Follow-Up    2010  1:55PM     

 

                    Postoperative Follow-Up    Mar  8 2010 10:57AM     

 

                    Artificial opening status; colostomy    Mar  8 2010  1:19PM     

 

                    Peritoneal Neoplasm, Malignant    Mar  8 2010  1:19PM     

 

                    Artificial opening status; colostomy    2010  1:40PM     

 

                    Hyperlipidemia      2010  1:40PM     

 

                    Anemia, Pernicious    2010  1:40PM     

 

                    Peritoneal Neoplasm, Malignant    2010  1:40PM     

 

                    Arthritis unspecified    2010  1:40PM     

 

                    Anemia of Chronic Illness    2010  1:40PM     

 

                    Tinea corporis      2010  3:17PM     

 

                    Bipolar disorder, unspecified    2010  1:33PM     

 

                    Hyperlipidemia      2010  1:33PM     

 

                    Anemia, Pernicious    2010  1:33PM     

 

                    Peritoneal Neoplasm, Malignant    2010  1:33PM     

 

                    B12 deficiency      2010  1:33PM     

 

                    Ethmoidal Sinusitis, Acute    Sep 21 2010  3:53PM     

 

                    Wheezing            Sep 21 2010  3:53PM     

 

                    Flu                 Oct 15 2010  1:40PM     

 

                    Bipolar disorder, unspecified    Oct 15 2010  1:42PM     

 

                    Hyperlipidemia      Oct 15 2010  1:42PM     

 

                    Anemia, Pernicious    Oct 15 2010  1:42PM     

 

                    Peritoneal Neoplasm, Malignant    Oct 15 2010  1:42PM     

 

                    Bipolar disorder, unspecified    2011 12:01PM     

 

                    Hyperlipidemia      2011 12:01PM     

 

                    Anemia, Pernicious    2011 12:01PM     

 

                    Peritoneal Neoplasm, Malignant    2011 12:01PM     

 

                    Bipolar disorder, unspecified    Apr 15 2011 10:55AM     

 

                    Major Depression    2011 10:11AM     

 

                    Bipolar Disorder    2011 10:11AM     

 

                    Cancer              May 10 2011  4:16PM     

 

                    Major Depression    May 10 2011  3:16PM     

 

                    Bipolar Disorder    May 10 2011  3:16PM     

 

                    Hypercalcemia       May 23 2011  2:47PM     

 

                    Bipolar disorder, unspecified    May 23 2011  2:47PM     

 

                    Colon Cancer, Personal History    May 23 2011  2:47PM     

 

                    Bipolar Disorder    May 31 2011  4:39PM     

 

                    Depressive Disorder    2011 10:01AM     

 

                    Vitamin B12 deficiency    2011 10:01AM     

 

                    Vitamin D Deficiency    2011  5:07PM     

 

                    Anemia, Vitamin B12 Deficiency    2011  5:07PM     

 

                    B12 deficiency      2011  3:56PM     

 

                    Routine gynecological examination    Aug  4 2011  9:08AM     

 

                    Screening Examination for Breast Cancer    Aug  4 2011  9:08AM     

 

                    Tinea Corporis      Aug  4 2011  9:08AM     

 

                    Depressive Disorder    Sep 23 2011  8:47AM     

 

                    Contact Dermatitis    Sep 23 2011  8:47AM     

 

                    Anemia, Pernicious    Sep 23 2011  8:47AM     

 

                    B12 deficiency      Sep 23 2011  8:47AM     

 

                    B12 deficiency      Sep 27 2011  2:58PM     

 

                    B12 deficiency      Oct 20 2011  2:34PM     

 

                    Flu                 Dec  9 2011  3:16PM     

 

                    B12 deficiency      Dec  9 2011  3:17PM     

 

                    B12 deficiency      2012  4:52PM     

 

                    B12 deficiency      b 2012 11:10AM     

 

                    B12 deficiency      2012  3:37PM     

 

                    B12 deficiency      May  3 2012  4:10PM     

 

                    B12 deficiency      2012  2:54PM     

 

                    B12 deficiency      2012 11:23AM     

 

                    B12 deficiency      Aug  9 2012  2:08PM     

 

                    B12 deficiency      Sep  6 2012  4:36PM     

 

                    B12 deficiency      Oct 16 2012 10:23AM     

 

                    Flu                 Feb  2013  3:11PM     

 

                    Bipolar disorder, unspecified    Feb  2013  2:48PM     

 

                    Anemia, Pernicious    Feb  4   2:48PM     

 

                    B12 deficiency      Feb  4   2:48PM     

 

                    Extrapyramidal abnormal movement disorder    Feb  4   2:48PM     

 

                    B12 deficiency      Apr  3 2013 12:03PM     

 

                    Bipolar disorder, unspecified    May  7 2013  1:31PM     

 

                    Anemia, Pernicious    May  7 2013  1:31PM     

 

                    B12 deficiency      May  7 2013  1:31PM     

 

                    Extrapyramidal abnormal movement disorder    May  7 2013  1:31PM     

 

                    B12 deficiency      2013  3:42PM     

 

                    B12 deficiency      2013  1:31PM     

 

                    Hyperlipidemia      Aug  7 2013 10:37AM     

 

                    Vitamin D Deficiency    Aug  7 2013 10:37AM     

 

                    Bipolar disorder, unspecified    Aug  7 2013 10:37AM     

 

                    Anemia, Pernicious    Aug  7 2013 10:37AM     

 

                    B12 deficiency      Aug  7 2013 10:37AM     

 

                    B12 deficiency      Sep 25 2013 11:15AM     

 

                    B12 deficiency      Dec 11 2013  3:16PM     

 

                    B12 deficiency      Mar  6 2014  1:48PM     

 

                    B12 deficiency      May 21 2014  3:17PM     

 

                    Screening Examination for Breast Cancer    2014  3:23PM     

 

                    Periumbilical abdominal pain    2014  3:23PM     

 

                    B12 deficiency      Jul 10 2014  2:52PM     

 

                    Anemia, Vitamin B12 Deficiency    Aug 13 2014  4:50PM     

 

                    Bipolar disorder    Oct 16 2014 11:13AM     

 

                    Hyperlipidemia      Oct 16 2014 11:13AM     

 

                    Anemia, Pernicious    Oct 16 2014 11:13AM     

 

                    Peritoneal Neoplasm, Malignant    Oct 16 2014 11:13AM     

 

                    Screening breast examination    Oct 16 2014 11:13AM     

 

                    Weight loss         Oct 16 2014 11:13AM     

 

                    Anemia, Pernicious    Mar 23 2015  2:57PM     

 

                    B12 deficiency      Mar 23 2015  2:57PM     

 

                    Need for Prevnar vaccine    Mar 23 2015  2:57PM     

 

                    Bipolar disorder    Mar 23 2015  2:57PM     

 

                    Hyperlipidemia      Mar 23 2015  2:57PM     

 

                    Anemia, Pernicious    Mar 23 2015  2:57PM     

 

                    Peritoneal Neoplasm, Malignant    Mar 23 2015  2:57PM     

 

                    B12 deficiency      May  4 2015  4:48PM     

 

                    Hyperlipidemia      May 13 2015  9:56AM     

 

                    Anemia              May 13 2015  9:56AM     

 

                    Bipolar disorder    May 13 2015  9:56AM     

 

                    Bipolar disorder    May 14 2015  3:27PM     

 

                    Hyperlipidemia      May 14 2015  3:27PM     

 

                    Anemia, Pernicious    May 14 2015  3:27PM     

 

                    Peritoneal Neoplasm, Malignant    May 14 2015  3:27PM     

 

                    B12 deficiency      2015  2:20PM     

 

                    B12 deficiency      2015 11:34AM     

 

                    B12 deficiency      Aug 18 2015  9:06AM     

 

                    Tinea Corporis      Sep 18 2015  8:54AM     

 

                    B12 deficiency      Sep 18 2015  8:54AM     

 

                    B12 deficiency      2015 10:28AM     

 

                    Herpes zoster without complication    Dec  3 2015  9:52AM     

 

                    B12 deficiency      Dec 23 2015 11:21AM     

 

                    B12 deficiency      2016  4:51PM     

 

                    Vitamin B 12 deficiency    Mar 14 2016  5:35PM     

 

                    B12 deficiency      Mar 15 2016 12:14PM     

 

                    B12 deficiency      May  5 2016 11:30AM     

 

                    Edema               May  5 2016 11:30AM     

 

                    Dermatitis          May  5 2016 11:30AM     

 

                    Edema               May 17 2016  8:38AM     

 

                    Shortness of breath    May 17 2016  8:38AM     

 

                    Bilateral edema of lower extremity    2016  2:06PM     

 

                    B12 deficiency      2016  2:06PM     

 

                    B12 deficiency      2016 11:50AM     

 

                    B12 deficiency      2016 11:20AM     

 

                    Diarrhea            Aug  2 2016  3:13PM     

 

                    B12 deficiency      Aug 24 2016 11:10AM     

 

                    Encounter for screening mammogram for breast cancer    Aug 24 2016 11:44AM     

 

                    B12 deficiency      Sep 28 2016  2:35PM     

 

                    B12 deficiency      Dec 15 2016  2:02PM     

 

                    Dysuria             Dec 29 2016 12:14PM     

 

                    Hematuria           Fidencio  3 2017  1:33PM     

 

                    Constipation by delayed colonic transit    2017  1:52PM     

 

                    Ileus               2017  1:52PM     

 

                    UTI (urinary tract infection)    Fidencio 15 2017  3:39PM     

 

                    Acute cystitis with hematuria    2017 11:07AM     

 

                    B12 deficiency      2017 11:07AM     

 

                    B12 deficiency      2017 11:40AM     

 

                    B12 deficiency      2017  4:07PM     

 

                    Slurred speech      2017  3:07PM     

 

                    Vitamin B12 deficiency    2017  3:07PM     

 

                    Dysphagia, unspecified type    2017  3:07PM     

 

                    Hyperlipidemia      2017  3:07PM     

 

                    Dysuria             2017 12:01PM     

 

                    B12 deficiency      2017  2:08PM     







Payers







           Insurance Name    Company Name    Plan Name    Plan Number    Policy Number    Policy Group

 Number                                 Start Date

 

                    Medicare RHC Medicare RHC              [id number]              N/A

 

                          [employer]    [employer]                 [id number]

                                                    

 

                    Medicare Part A    Medicare - Lab/Xray              [id number]              2006

 

                    Medicare Part B    Medicare [place]              [id number]              2006

 

                          [employer]    [employer]                 50 percent

                                                    2009

 

                    [facility]    [employer]              [id number]              N/A

 

                    Medicare Part A    Medicare Part A              [id number]              N/A

 

                    Medicare Part A    Medicare Part A              [id number]              2006









History of Encounters







                    Visit Date          Visit Type          Provider

 

                    2017           Nurse visit         Bhupinder Louise DO

 

                    2017           Office visit         

 

                    2017           Office visit        Bhupinder Louise DO

 

                    2017           Nurse visit         Bhupinder Louise DO

 

                    2017           Office visit        Radha GREEN

 

                    1/15/2017           Office visit        Aj Tapia NP

 

                    2017            Office visit        Devin Masterson MD

 

                    2016          [facility]            Devin Masterson MD

 

                    12/15/2016          Nurse visit         Bhupinder Louise DO

 

                    2016           Nurse visit         Bret Forte APRN

 

                    2016           Nurse visit         Bhupinder Aspen DO

 

                    2016            Office visit        Bhupinder Aspen DO

 

                    2016           Nurse visit         Bhupinder Aspen DO

 

                    2016           Office visit        Bret Reanna APRN

 

                    2016            Office visit        Bhupinder Aspen DO

 

                    3/15/2016           Nurse visit         Bhupinder Aspen DO

 

                    2016            Nurse visit         Bhupinder Aspen DO

 

                    2015          Nurse visit         Bhupinder Aspen DO

 

                    12/3/2015           Office visit        Bhupinder Aspen DO

 

                    2015          Nurse visit         Bhupinder Aspen DO

 

                    2015           Office visit        Bhupinder Aspen DO

 

                    2015           Nurse visit         Bhupinder Aspen DO

 

                    2015            Nurse visit         Bhupinder Aspen DO

 

                    2015            Nurse visit         Bhupinder Aspen DO

 

                    2015           Office visit        Bhupinder Aspen DO

 

                    2015            Nurse visit         Bhupinder Aspen DO

 

                    3/23/2015           Office visit        Bhupinder Aspen DO

 

                    10/16/2014          Office visit        Bhupinder Aspen DO

 

                    2014           Nurse visit         Radha Weston APRN

 

                    7/10/2014           Nurse visit         Bhupinder Aspen DO

 

                    2014           Office visit        Bhupinder Aspen DO

 

                    2014           Nurse visit         Bhupinder Aspen DO

 

                    3/6/2014            Nurse visit         Bhupinder Aspen DO

 

                    2014            [facility]            EARNEST Lopez MD

 

                    2013          Nurse visit         Bhupinder Aspen DO

 

                    2013           Nurse visit         Bhupinder Aspen DO

 

                    2013            Office visit        Bhupinder Aspen DO

 

                    2013            Nurse visit         Bhupinder Aspen DO

 

                    2013            Nurse visit         Bhupinder Aspen DO

 

                    2013            Office visit        Bhupinder Aspen DO

 

                    4/3/2013            Nurse visit         Bhupinder Aspen DO

 

                    2013            Office visit        Bhupinder Aspen DO

 

                    10/16/2012          Nurse visit         Bhupinder Aspen DO

 

                    2012            Nurse visit         Bhupinder Aspen DO

 

                    2012            Voided              Bhupinder Aspen DO

 

                    2012            Nurse visit         Bhupinder Aspen DO

 

                    2012            Nurse visit         Bhupinder Aspen DO

 

                    2012           Nurse visit         Bhupinder Aspen DO

 

                    5/3/2012            Nurse visit         Bhupinder Aspen DO

 

                    2012           Nurse visit         Bhupinder Aspen DO

 

                    2012           Nurse visit         Bhupinder Aspen DO

 

                    2012           Nurse visit         Bhupinder Aspen DO

 

                    2011           Nurse visit         Bhupinder Aspen DO

 

                    10/20/2011          Nurse visit         Bhupinder Aspen DO

 

                    2011           Office visit        Bhupinder Aspen DO

 

                    2011           Nurse visit         Radha GREEN

 

                    2011            Office visit        Bhupinder Aspen DO

 

                    2011           Nurse visit         Bhupinder Aspen DO

 

                    2011            Office visit        Bhupinder Aspen DO

 

                    2011           Office visit        Bhupinder Aspen DO

 

                    5/10/2011           Office visit        Bhupinder Aspen DO

 

                    2011           Office visit        Bhupinder Aspen DO

 

                    4/15/2011           Office visit        Devin Angel DO

 

                    2011           Office visit        Devin Angel DO

 

                    10/15/2010          Office visit        Devin Angel DO

 

                    2010           Office visit        Devin Angel DO

 

                    2010            Office visit        Devin Angel DO

 

                    2010           Office visit        Devin Angel DO

 

                    2010            Office visit        Devin Angel DO

 

                    3/8/2010            Office visit        Devin Masterson MD

 

                    2010            Surgery             Devin Masterson MD

 

                    2010            Office visit        Devin Angel DO

 

                    2010           Surgery             Devin Masterson MD

 

                    2010           Hospital            Devin Masterson MD

 

                    2010           [facility]            Devin Masterson MD

 

                    10/22/2009          Office visit        Devin Angel DO

## 2019-06-26 NOTE — XMS REPORT
MU2 Ambulatory Summary

                             Created on: 2017



Pauline Gan

External Reference #: [id number]

: 1950

Sex: Female



Demographics







                          Address                   [address]

[place]  [id number]

 

                          Home Phone                [phone]

 

                          Preferred Language        English

 

                          Marital Status            Legally 

 

                          Mu-ism Affiliation     Unknown

 

                          Race                      White

 

                          Ethnic Group              Not  or 





Author







                          Author                    Bhupinder Louise

 

                          [facility]

 

                          Address                   [address]

[place]  [id number]



 

                          Phone                     [phone]







Care Team Providers







                    Care Team Member Name    Role                Phone

 

                    Bhupinder Louise    PCP                 Unavailable

 

                    Bhupinder Louise    PreferredProvider    Unavailable







Allergies and Adverse Reactions







                    Name                Reaction            Notes

 

                    NO KNOWN DRUG ALLERGIES                         







Plan of Treatment







             Planned Activity    Comments     Planned Date    Planned Time    Plan/Goal

 

             Swallowing evaluation                 2017    12:00 AM      







Medications







                                        Active 

 

             Name         Start Date    Estimated Completion Date    SIG          Comments

 

                Latuda 20 mg oral tablet                                    take 1 tablet (20 mg) by oral route once daily with

 food (at least 350 calories)            

 

             pravastatin 40 mg oral tablet    3/30/2015                 TAKE 1 TABLET BY MOUTH DAILY     

 

                Namenda XR 28 mg oral capsule,sprinkle,ER 24hr    2015                       take 1 capsule (28

 mg) by oral route once daily            

 

                Namenda XR 28 mg oral capsule,sprinkle,ER 24hr    2016                       take 1 capsule (28

 mg) by oral route once daily            

 

                potassium chloride 10 mEq oral tablet extended release    2016                       take 1 tablet

 (10 meq) by oral route once daily       

 

             pravastatin 40 mg oral tablet    2016                 TAKE 1 TABLET BY MOUTH DAILY     

 

                Vitamin B-12 1,000 mcg/mL injection solution    2016                       inject 1 milliliter 

(1,000 mcg) by intramuscular route once a month     

 

                potassium chloride 10 mEq oral tablet extended release    2016                      take 1 tablet

 (10 meq) by oral route once daily       

 

                Namenda XR 28 mg oral capsule,sprinkle,ER 24hr    2016                      TAKE 1 CAPSULE BY

 MOUTH EVERY DAY                         

 

                furosemide 40 mg oral tablet    2016                      take 1 tablet (40 mg) by oral route

 once daily                              

 

                mirtazapine 45 mg oral tablet                                    take 1 tablet (45 mg) by oral route once daily

 before bedtime                          

 

             Fish Oil 300-1,000 mg oral capsule                              take 1 capsule by oral route daily     

 

             Vitamin D3 1,000 unit oral tablet                              take 1 tablet by oral route daily     

 

                Calcium 600 600 mg calcium (1,500 mg) oral tablet                                    take 1 tablet by oral route

 daily                                   

 

                Aspirin Low Dose 81 mg oral tablet,delayed release (DR/EC)                                    take 1 tablet 

(81 mg) by oral route once daily         

 

                amoxicillin 500 mg oral tablet    2017       take 1 tablet (500 mg) by oral

 route every 12 hours for 7 days         









                                         

 

             Name         Start Date    Expiration Date    SIG          Comments

 

             Reglan 10mg    3/29/2010    2010    one ac and hs     

 

                Keflex 500 mg oral capsule    2010       10/1/2010       take 1 capsule (500 mg) by oral

 route every 6 hours for 10 days         

 

                Bactrim -160 mg oral tablet    2011       take 1 tablet by oral route

 every 12 hours for 7 days               

 

                triamcinolone acetonide 0.1 % topical cream    2011      apply a thin

 layer to the affected area(s) by topical route 2 times per day     

 

                sertraline 100 mg oral tablet    4/10/2012       5/10/2012       take 1.5 tablets by oral route

 daily for 30 days                       

 

                ergocalciferol (vitamin D2) 50,000 unit oral capsule    4/15/2013       2013       TAKE

 ONE CAPSULE BY MOUTH ONCE A WEEK        

 

                CYANOCOBALAM 1000MCGINJ 1000 milliliter    2013       INJECT 1ML INTRAMUSCULAR

 ONCE A MONTH                            

 

                pravastatin 40 mg oral tablet    3/25/2014       3/20/2015       TAKE ONE TABLET BY MOUTH EVERY

 DAY                                     

 

                          Zostavax (PF) 19,400 unit/0.65 mL subcutaneous suspension for reconstitution    3/23/2015

                    3/24/2015           inject 0.65 milliliter by subcutaneous route once     

 

                famciclovir 500 mg oral tablet    12/3/2015       12/10/2015      take 1 tablet (500 mg) by

 oral route every 8 hours for 7 days     

 

                furosemide 40 mg oral tablet    2016      take 1 tablet (40 mg) by oral

 route once daily                        

 

                Cipro 500 mg oral tablet    2016       take 1 tablet (500 mg) by oral route

 2 times per day for 5 days              

 

                Bactrim -160 mg oral tablet    2016        take 1 tablet by oral route

 every 12 hours for 7 days               

 

                metoclopramide HCl 10 mg oral tablet    2017       take 1 tablet by oral

 route 2 times a day for 50 days         

 

                Macrobid 100 mg oral capsule    2017       take 1 capsule (100 mg) by oral

 route 2 times per day with food for 7 days     

 

                Augmentin 875-125 mg oral tablet    2017       take 1 tablet by oral route

 every 12 hours for 7 days               









                                        Discontinued 

 

             Name         Start Date    Discontinued Date    SIG          Comments

 

                Tylenol 325 mg oral tablet                    2013        take 1 - 2 tablets (325 -650 mg) by oral

 route every 4-6 hours as needed         

 

                Calcium 600 + D(3) 600 mg(1,500mg) -400 unit oral tablet                    2011       take 1 tablet

 by oral route 2 times a day            no longer taking

 

                Vitamin B-12 1,000 mcg oral tablet extended release    2010       take 1

 tablet by oral route daily             no longer taking

 

                Antifungal (clotrimazole) 1 % topical cream    2010       apply to the 

affected and surrounding areas of skin by topical route 2 times per day morning 
and evening                              

 

                sertraline 100 mg oral tablet    5/10/2011       2011       take 2 tablets (200 mg) by 

oral route once daily                   discontinued by Dr. Serrano

 

                mirtazapine 15 mg oral tablet                    2011        take 1 tablet (15 mg) by oral route 

once daily before bedtime               Dr. Serrano

 

                mirtazapine 15 mg oral tablet                    2011        take 1 tablet (15 mg) by oral route 

once daily before bedtime               dc'd by Dr. Serrano

 

                Pristiq 50 mg oral tablet extended release 24 hr                    2013        take 1 tablet (50

 mg) by oral route once daily           Dr. Serrano

 

                Pristiq 50 mg oral tablet extended release 24 hr                    2013        take 1 tablet (50

 mg) by oral route once daily           dose updated

 

                Vitamin B-12 1,000 mcg/mL injection solution    2011        inject 1 milliliter

 (1,000 mcg) by intramuscular route once a month    on list already

 

                    syringe with needle 1 mL 25 gauge x 1" miscellaneous syringe    2011

                          use for injection once a month     

 

                clotrimazole 1 % topical cream    2011        apply to the affected and surrounding

 areas of skin by topical route 2 times per day in the morning and evening     

 

                Vitamin D2 50,000 unit oral capsule    2011        take 1 capsule (50,000

 unit) by oral route once weekly        generic on list

 

                Pravachol 40 mg oral tablet    2012        take 1 tablet (40 mg) by oral 

route once daily for 90 days            generic on list

 

                lithium carbonate 300 mg oral capsule    2012        take 1 capsule by oral

 route daily                            dose updated

 

                Pristiq 100 mg oral tablet extended release 24 hr                    4/10/2012       take 1 and 1/2 

tablet (150 mg) by oral route once daily    Mental Health provider

 

                Pristiq 100 mg oral tablet extended release 24 hr                    4/10/2012       take 1 and 1/2 

tablet (150 mg) by oral route once daily    Discontinued by Dr Efrain Knight at [facility]

 

                hydroxyzine HCl 50 mg oral tablet    10/16/2014      2015       take 1 tablet (50 mg) 

by oral route at bedtime                 

 

                lithium carbonate 300 mg oral capsule    2015       take 1 capsule (300

 mg) by oral route 2 for 30 days         

 

                fluconazole 100 mg oral tablet    2015       12/3/2015       take 1 tablet (100 mg) by 

oral route once a week                   

 

                ketoconazole 2 % topical cream    2015       12/3/2015       apply to the affected area(s)

 by topical route 2 times per day        

 

                prednisone 10 mg oral tablet    12/3/2015       2016        take 2 tablets (20 mg) by oral

 route once daily for 4 days 1 tablet daily for 4 days 0.5 tablet daily for 4 
days                                     

 

                triamcinolone acetonide 0.1 % topical cream    2016       apply a thin layer

 to the affected area(s) by topical route 2 times per day     

 

                Cipro 500 mg oral tablet    1/15/2017       2017       take 1 tablet (500 mg) by oral route

 every 12 hours for 10 days              







Problem List







                    Description         Status              Onset

 

                    Artificial opening status; colostomy    Active               

 

                    Bipolar disorder, unspecified    Active               

 

                    Hyperlipidemia      Active               

 

                    Peritoneal Neoplasm, Malignant    Active               

 

                    Anemia, Pernicious    Active               

 

                    Arthritis unspecified    Active               

 

                    B12 deficiency      Active               







Vital Signs







      Date    Time    BP-Sys(mm[Hg]    BP-Lynn(mm[Hg])    HR(bpm)    RR(rpm)    Temp    WT    HT    HC    BMI

                    BSA                 BMI Percentile      O2 Sat(%)

 

        2017    3:05:00 PM    134 mmHg    70 mmHg    70 bpm    20 rpm    97.4 F    181 lbs    69 in

                          26.73 kg/m2    2.00 m2                   98 %

 

        2017    11:07:00 AM    124 mmHg    64 mmHg    62 bpm    17 rpm    98.2 F    181.2 lbs    69

 in                       26.7583 kg/m    2.0003 m                 98 %

 

        1/15/2017    3:34:00 PM    148 mmHg    89 mmHg    69 bpm    20 rpm    98.2 F    179 lbs    69 in

                          26.43 kg/m2    1.99 m2                   98 %

 

       2017    1:51:00 PM    160 mmHg    90 mmHg    100 bpm    20 rpm    96.5 F    179 lbs             

                                                                98 %

 

       2016    3:11:00 PM    134 mmHg    76 mmHg    80 bpm    20 rpm    98 F    163 lbs    69 in     

                24.07 kg/m2     1.90 m2                         98 %

 

        2016    2:04:00 PM    142 mmHg    86 mmHg    68 bpm    16 rpm    98.5 F    166 lbs    63 in

                          29.4053 kg/m    1.8295 m                 100 %

 

        2016    11:27:00 AM    148 mmHg    78 mmHg    90 bpm    20 rpm    98.2 F    153 lbs    69 in

                          22.59 kg/m2    1.84 m2                   96 %

 

        12/3/2015    9:50:00 AM    132 mmHg    70 mmHg    62 bpm    16 rpm    97.9 F    145 lbs    69 in

                          21.4125 kg/m    1.7894 m                 100 %

 

        2015    8:52:00 AM    132 mmHg    68 mmHg    52 bpm    20 rpm    97.8 F    141 lbs    69 in

                          20.82 kg/m2    1.76 m2                   100 %

 

        2015    3:25:00 PM    120 mmHg    62 mmHg    72 bpm    16 rpm    98.1 F    136 lbs    69 in

                          20.0835 kg/m    1.733 m                 98 %

 

       3/23/2015    2:55:00 PM    130 mmHg    76 mmHg    68 bpm    18 rpm    97 F    140 lbs    69 in    

                20.67 kg/m2     1.76 m2                         98 %

 

        10/16/2014    11:11:00 AM    120 mmHg    66 mmHg    77 bpm    20 rpm    98 F    130 lbs    69 in

                          19.1974 kg/m    1.6943 m                 100 %

 

        2014    3:21:00 PM    130 mmHg    66 mmHg    63 bpm    18 rpm    97.2 F    160 lbs    69 in

                          23.6276 kg/m    1.88 m2                   99 %

 

        2013    10:35:00 AM    132 mmHg    70 mmHg    66 bpm    20 rpm    98.1 F    157 lbs    69 in

                          23.18 kg/m2    1.862 m                  

 

        2013    1:29:00 PM    132 mmHg    70 mmHg    76 bpm    18 rpm    98.2 F    166 lbs    69 in 

                          24.5137 kg/m    1.91 m2                    

 

       2013    2:46:00 PM    128 mmHg    70 mmHg    76 bpm    16 rpm    98 F    160 lbs    69 in     

                23.63 kg/m2     1.8797 m                       

 

        2011    8:49:00 AM    128 mmHg    78 mmHg    70 bpm    18 rpm    97.9 F    164 lbs    69 in

                          24.2183 kg/m    1.90 m2                    

 

     2011    1:31:00 PM    132 mmHg    68 mmHg    84 bpm         97 F    167 lbs                        

                                         

 

        2011    9:09:00 AM    128 mmHg    70 mmHg    72 bpm    18 rpm    98.2 F    163 lbs    64 in 

                          27.9786 kg/m    1.83 m2                    

 

       2011    10:01:00 AM    132 mmHg    70 mmHg    72 bpm    18 rpm    98.2 F    154 lbs             

                                                                 

 

       2011    2:47:00 PM    128 mmHg    70 mmHg    72 bpm    18 rpm    97.8 F    156 lbs             

                                                                 

 

       5/10/2011    3:16:00 PM    144 mmHg    80 mmHg    72 bpm    18 rpm    98.2 F    158 lbs             

                                                                 

 

        2011    10:11:00 AM    132 mmHg    70 mmHg    70 bpm    18 rpm    98.2 F    168 lbs    69 in

                          24.809 kg/m    1.93 m2                    

 

        4/15/2011    10:52:00 AM    110 mmHg    60 mmHg    75 bpm    16 rpm    97.5 F    172.375 lbs    

69 in                     25.46 kg/m2    1.951 m                 100 %

 

        2011    11:43:00 AM    120 mmHg    82 mmHg    75 bpm    16 rpm    97.2 F    178.5 lbs    69

 in                       26.3596 kg/m    1.99 m2                   100 %

 

        10/15/2010    1:32:00 PM    120 mmHg    70 mmHg    80 bpm    18 rpm    96.6 F    177 lbs    69 in

                          26.14 kg/m2    1.977 m                 100 %

 

        2010    3:50:00 PM    168 mmHg    100 mmHg    82 bpm    18 rpm    97.8 F    177.5 lbs    69

 in                       26.2119 kg/m    1.98 m2                   97 %

 

        2010    1:21:00 PM    140 mmHg    80 mmHg    59 bpm    16 rpm    97.6 F    173.25 lbs    69 

in                        25.58 kg/m2    1.956 m                 100 %

 

        2010    3:02:00 PM    140 mmHg    80 mmHg    61 bpm    16 rpm    97.6 F    173.125 lbs    69

 in                       25.5658 kg/m    1.96 m2                   99 %

 

        2010    1:23:00 PM    130 mmHg    80 mmHg    66 bpm    16 rpm    96.8 F    173 lbs    69 in 

                          25.55 kg/m2    1.9546 m                 100 %

 

        2010    12:58:00 PM    130 mmHg    88 mmHg    75 bpm    16 rpm    98.4 F    172.25 lbs    69

 in                       25.4366 kg/m    1.95 m2                   100 %







Social History







                    Name                Description         Comments

 

                    denies alcohol use                         

 

                    denies smoking                           

 

                    Denies illicit substance abuse                         

 

                    retired                                 direct care

 

                    Single                                   

 

                    Exercises regularly                         

 

                    Attended some college                         

 

                    Tobacco             Never smoker         







History of Procedures







                    Date Ordered        Description         Order Status

 

                    2010 12:00 AM    COMPREHEN METABOLIC PANEL    Reviewed

 

                    2010 12:00 AM    COMPLETE CBC W/AUTO DIFF WBC    Reviewed

 

                    2010 12:00 AM    LIPID PANEL         Reviewed

 

                          2015 12:00 AM        B12 Injection, Up to 1000 Mcg NDC#5602-5053-95 [facility] 

                                        Reviewed

 

                    2011 12:00 AM    MAMMOGRAM SCREENING    Reviewed

 

                    2011 12:00 AM    CYTOPATH C/V THIN LAYER    Reviewed

 

                    2011 12:00 AM    B12 Injection 1 cc NDC#[address]-78    Reviewed

 

                    2015 12:00 AM    THER/PROPH/DIAG INJ SC/IM    Reviewed

 

                    2015 12:00 AM    B12 Injection, Up to 1000 Mcg NDC#7188-1573-72    Reviewed

 

                    2011 12:00 AM    THER/PROPH/DIAG INJ SC/IM    Reviewed

 

                    2011 12:00 AM    B12 Injection([place]) Ndc#4385-7908-75-    Reviewed

 

                    2015 12:00 AM    THER/PROPH/DIAG INJ SC/IM    Reviewed

 

                    2015 12:00 AM    B12 Injection, Up to 1000 Mcg NDC#3615-4103-37    Reviewed

 

                    10/20/2011 12:00 AM    THER/PROPH/DIAG INJ SC/IM    Reviewed

 

                    10/20/2011 12:00 AM    B12 Injection([place]) Ndc#9716-9106-21-    Reviewed

 

                    2016 12:00 AM    THER/PROPH/DIAG INJ SC/IM    Reviewed

 

                    2016 12:00 AM    B12 Injection, Up to 1000 Mcg NDC#5103-3838-53    Reviewed

 

                    3/14/2016 12:00 AM    VITAMIN B-12        Reviewed

 

                    3/15/2016 12:00 AM    THER/PROPH/DIAG INJ SC/IM    Reviewed

 

                    3/15/2016 12:00 AM    B12 Injection, Up to 1000 Mcg NDC#2796-9290-49    Reviewed

 

                    2011 12:00 AM    ***Immunization administration, Medicare flu    Reviewed

 

                    2011 12:00 AM    Fluzone ** MEDICARE Only **    Reviewed

 

                    2011 12:00 AM    THER/PROPH/DIAG INJ SC/IM    Reviewed

 

                    2011 12:00 AM    B12 Injection ([employer] [facility]) Ndc#1561-3256-81    Reviewed

 

                    2016 12:00 AM    B12 Injection, Up to 1000 Mcg NDC#4903-6163-14 [facility]    

Reviewed

 

                    2016 12:00 AM    TTE W/DOPPLER COMPLETE    Reviewed

 

                    2016 12:00 AM    EXTREMITY STUDY     Reviewed

 

                          2016 12:00 AM        B12 Injection, Up to 1000 Mcg NDC#7556-5669-68 [facility] 

                                        Reviewed

 

                    2016 12:00 AM    THER/PROPH/DIAG INJ SC/IM    Reviewed

 

                    2016 12:00 AM    B12 Injection, Up to 1000 Mcg NDC#6159-4971-31    Reviewed

 

                    2016 12:00 AM    THER/PROPH/DIAG INJ SC/IM    Reviewed

 

                    2012 12:00 AM    B12 Injection([place]) Ndc#4396-3780-88-    Reviewed

 

                    2016 12:00 AM    B12 Injection, Up to 1000 Mcg NDC#9104-6980-14    Reviewed

 

                    2016 12:00 AM    THER/PROPH/DIAG INJ SC/IM    Reviewed

 

                    2012 12:00 AM    THER/PROPH/DIAG INJ SC/IM    Reviewed

 

                    2012 12:00 AM    B12 Injection ([employer]) Ndc#6181-3028-94    Reviewed

 

                    2016 12:00 AM    THER/PROPH/DIAG INJ SC/IM    Reviewed

 

                    2016 12:00 AM    B12 Injection, Up to 1000 Mcg NDC#5031-0906-50    Reviewed

 

                    2016 12:00 AM    B12 Injection, Up to 1000 Mcg NDC#4236-7250-68    Reviewed

 

                    2016 12:00 AM    THER/PROPH/DIAG INJ SC/IM    Reviewed

 

                    2012 12:00 AM    THER/PROPH/DIAG INJ SC/IM    Reviewed

 

                    2012 12:00 AM    B12 Injection([place]) Ndc#0246-9630-74-    Reviewed

 

                    12/15/2016 12:00 AM    B12 Injection, Up to 1000 Mcg NDC#0546-6042-56    Reviewed

 

                    12/15/2016 12:00 AM    THER/PROPH/DIAG INJ SC/IM    Reviewed

 

                    2016 12:00 AM    URNLS DIP STICK/TABLET RGNT AUTO W/O MICROSCOPY    Returned

 

                    1/3/2017 12:00 AM    URNLS DIP STICK/TABLET RGNT AUTO W/O MICROSCOPY    Returned

 

                    2017 12:00 AM    URINE CULTURE/COLONY COUNT    Returned

 

                    2017 12:00 AM    Rocephin 1 gram Injection, [facility]    Reviewed

 

                    2017 12:00 AM    THERAPEUTIC PROPHYLACTIC/DX INJECTION SUBQ/IM    Reviewed

 

                    2017 12:00 AM    B12 1000mcg Injection, [facility]    Reviewed

 

                    5/3/2012 12:00 AM    THER/PROPH/DIAG INJ SC/IM    Reviewed

 

                    5/3/2012 12:00 AM    B12 Injection([place]) Ndc#4235-5716-44-    Reviewed

 

                    2017 12:00 AM    THERAPEUTIC PROPHYLACTIC/DX INJECTION SUBQ/IM    Reviewed

 

                    2017 12:00 AM    B12 1000mcg Injection, [facility]    Reviewed

 

                    2017 12:00 AM    MRI BRAIN STEM W/O & W/DYE    Reviewed

 

                    2017 12:00 AM    VITAMIN B-12        Reviewed

 

                    2017 12:00 AM    Speech Therapy Consult    Reviewed

 

                    2017 12:00 AM    ASSAY OF CREATININE    Reviewed

 

                    2012 12:00 AM    IMMUNOTHERAPY INJECTIONS    Reviewed

 

                    2012 12:00 AM    B12 Injection([place]) Ndc#0045-2812-00-    Reviewed

 

                    2012 12:00 AM    THER/PROPH/DIAG INJ SC/IM    Reviewed

 

                    2012 12:00 AM    B12 Injection, Up to 1000 Mcg NDC#9646-3731-05    Reviewed

 

                    2012 12:00 AM    THER/PROPH/DIAG INJ SC/IM    Reviewed

 

                    2012 12:00 AM    B12 Injection, Up to 1000 Mcg NDC#6089-7632-85    Reviewed

 

                    2012 12:00 AM    THER/PROPH/DIAG INJ SC/IM    Reviewed

 

                    2012 12:00 AM    B12 Injection, Up to 1000 Mcg NDC#0192-1588-06    Reviewed

 

                    10/16/2012 12:00 AM    THER/PROPH/DIAG INJ SC/IM    Reviewed

 

                    10/16/2012 12:00 AM    B12 Injection, Up to 1000 Mcg NDC#3571-5967-18    Reviewed

 

                    2010 12:00 AM    COMPREHEN METABOLIC PANEL    Reviewed

 

                    2010 12:00 AM    COMPLETE CBC W/AUTO DIFF WBC    Reviewed

 

                    2010 12:00 AM    LIPID PANEL         Reviewed

 

                    2013 12:00 AM    Flu Injection 3 Years And Above NDC# [address]-98  [facility]    Reviewed



 

                    2013 12:00 AM    COMPLETE CBC W/AUTO DIFF WBC    Reviewed

 

                    2013 12:00 AM    ASSAY OF LITHIUM    Reviewed

 

                    2013 12:00 AM    METABOLIC PANEL TOTAL CA    Reviewed

 

                    4/3/2013 12:00 AM    THER/PROPH/DIAG INJ SC/IM    Reviewed

 

                    4/3/2013 12:00 AM    B12 Injection, Up to 1000 Mcg NDC#8686-4311-94    Reviewed

 

                    2013 12:00 AM    THER/PROPH/DIAG INJ SC/IM    Reviewed

 

                    2013 12:00 AM    B12 Injection, Up to 1000 Mcg NDC#0142-5138-39    Reviewed

 

                    2013 12:00 AM    THER/PROPH/DIAG INJ SC/IM    Reviewed

 

                    2013 12:00 AM    B12 Injection, Up to 1000 Mcg NDC#3840-3500-54    Reviewed

 

                    2013 12:00 AM    LIPID PANEL         Reviewed

 

                    2013 12:00 AM    VITAMIN D 25 HYDROXY    Reviewed

 

                    2013 12:00 AM    THER/PROPH/DIAG INJ SC/IM    Reviewed

 

                    2013 12:00 AM    B12 Injection, Up to 1000 Mcg NDC#7059-7496-47    Reviewed

 

                    2013 12:00 AM    THER/PROPH/DIAG INJ SC/IM    Reviewed

 

                    3/6/2014 12:00 AM    THER/PROPH/DIAG INJ SC/IM    Reviewed

 

                    2014 12:00 AM    THER/PROPH/DIAG INJ SC/IM    Reviewed

 

                    2014 12:00 AM    B12 Injection, Up to 1000 Mcg NDC#3762-0875-28    Reviewed

 

                    2010 12:00 AM    SKIN FUNGI CULTURE    Reviewed

 

                    10/9/2010 12:00 AM    COMPREHEN METABOLIC PANEL    Reviewed

 

                    10/9/2010 12:00 AM    LIPID PANEL         Reviewed

 

                    2010 12:00 AM    THER/PROPH/DIAG INJ SC/IM    Reviewed

 

                    2010 12:00 AM    B12 Injection Ndc#[address]-66 ([facility])    Reviewed

 

                    2010 12:00 AM    THER/PROPH/DIAG INJ SC/IM    Reviewed

 

                    2010 12:00 AM    Kenalog 40 Mg Im-Ndc#[address]-88 ([facility])    Reviewed

 

                    10/15/2010 12:00 AM    FLU VACCINE 3 YRS & > IM    Reviewed

 

                    10/15/2010 12:00 AM    Admin.Of M/C Cov.Vaccine-Flu Vacc.    Reviewed

 

                    1/15/2011 12:00 AM    COMPLETE CBC W/AUTO DIFF WBC    Reviewed

 

                    1/15/2011 12:00 AM    COMPREHEN METABOLIC PANEL    Reviewed

 

                    1/15/2011 12:00 AM    LIPID PANEL         Reviewed

 

                    2014 12:00 AM    MAMMOGRAM SCREENING    Reviewed

 

                    2014 12:00 AM    Screening mammography, bilateral    Reviewed

 

                    7/10/2014 12:00 AM    THER/PROPH/DIAG INJ SC/IM    Reviewed

 

                    7/10/2014 12:00 AM    B12 Injection, Up to 1000 Mcg NDC#0946-5674-26    Reviewed

 

                    2011 12:00 AM    COMPLETE CBC W/AUTO DIFF WBC    Reviewed

 

                    2011 12:00 AM    COMPREHEN METABOLIC PANEL    Reviewed

 

                    2011 12:00 AM    LIPID PANEL         Reviewed

 

                    2014 12:00 AM    B12 Injection, Up to 1000 Mcg NDC#3958-6501-54    Reviewed

 

                    10/19/2014 12:00 AM    MAMMOGRAM SCREENING    Reviewed

 

                    10/19/2014 12:00 AM    Screening mammography, bilateral    Reviewed

 

                    10/16/2014 12:00 AM    B12 Injection, Up to 1000 Mcg NDC#3697-7650-34    Reviewed

 

                    10/16/2014 12:00 AM    COMPLETE CBC W/AUTO DIFF WBC    Reviewed

 

                    10/16/2014 12:00 AM    COMPREHEN METABOLIC PANEL    Reviewed

 

                    10/16/2014 12:00 AM    IMMUNOASSAY TUMOR     Reviewed

 

                    10/16/2014 12:00 AM    LIPID PANEL         Reviewed

 

                    10/16/2014 12:00 AM    ASSAY OF LITHIUM    Reviewed

 

                    10/16/2014 12:00 AM    MAMMOGRAM SCREENING    Reviewed

 

                    2011 12:00 AM    ASSAY OF PARATHORMONE    Reviewed

 

                    2011 12:00 AM    VITAMIN D 25 HYDROXY    Reviewed

 

                    2011 12:00 AM    ASSAY OF LITHIUM    Reviewed

 

                    2011 12:00 AM    METABOLIC PANEL TOTAL CA    Reviewed

 

                    2011 12:00 AM    CT HEAD/BRAIN W/O & W/DYE    Reviewed

 

                    3/23/2015 12:00 AM    PNEUMOCOCCAL VACC 13 GLENDY IM    Reviewed

 

                    3/23/2015 12:00 AM    Vitamin B12 injection    Reviewed

 

                    2011 12:00 AM    ASSAY OF LITHIUM    Reviewed

 

                    2011 12:00 AM    B12 Injection Ndc#[address]-09  [facility]    Reviewed

 

                    2015 12:00 AM    THER/PROPH/DIAG INJ SC/IM    Reviewed

 

                    2015 12:00 AM    B12 Injection, Up to 1000 Mcg NDC#0615-5197-75    Reviewed

 

                    2015 12:00 AM    COMPLETE CBC W/AUTO DIFF WBC    Reviewed

 

                    2015 12:00 AM    COMPREHEN METABOLIC PANEL    Reviewed

 

                    2015 12:00 AM    LIPID PANEL         Reviewed

 

                    2015 12:00 AM    ASSAY OF LITHIUM    Reviewed

 

                    2011 12:00 AM    VIT D 1 25-DIHYDROXY    Reviewed

 

                    2011 12:00 AM    VITAMIN B-12        Reviewed

 

                    2015 12:00 AM    B12 Injection, Up to 1000 Mcg NDC#4614-3938-97    Reviewed

 

                    2015 12:00 AM    THER/PROPH/DIAG INJ SC/IM    Reviewed

 

                    2015 12:00 AM    B12 Injection, Up to 1000 Mcg NDC#6691-1884-44    Reviewed

 

                    2011 12:00 AM    THER/PROPH/DIAG INJ SC/IM    Reviewed

 

                    2011 12:00 AM    B12 Injection ([employer] [facility]) Ndc#0413-7064-88    Reviewed

 

                    2015 12:00 AM    THER/PROPH/DIAG INJ SC/IM    Reviewed

 

                    2015 12:00 AM    B12 Injection, Up to 1000 Mcg NDC#8652-5743-93    Reviewed







Results Summary







                          Data and Description      Results

 

                          2004 12:00 AM        Colonoscopy-Women and Men over 50 Normal 

 

                          2008 12:00 AM         Pap Smear Declined 

 

                          10/7/2009 12:00 AM        Cholest Cry Stone Ql .0 %LDLc SerPl-mCnc 123.0 mg/dLHDLc

 SerPl-mCnc 34.0 mg/dLTrigl SerPl-mCnc 190.0 mg/dLGlucose SerPl-mCnc 78.0 mg/dL

 

                          2009 12:00 AM        Mammogram -Women over 40 Normal HIV1+2 Ab Ser Ql no risk 

 

                          2010 8:47 AM         Dexa Bone Scan Refused Aspirin reccommended Contraindication 



 

                          2010 8:48 AM         Depression Done 

 

                          2010 12:00 AM         Foot Exam-Diabetic Done 

 

                          2010 12:00 AM         Cholest Cry Stone Ql .0 %LDLc SerPl-mCnc 126.0 mg/dLGlucose

 SerPl-mCnc 102.0 mg/dL

 

                          2010 8:45 AM          TRIGLYCERIDES 122.0 mg/dLCHOLESTEROL 186.0 mg/dLHDL 36.0 mg/dLTOT

 CHOL/HDL 5.2 LDL (CALC) 126.0 mg/dLGLUCOSE 102.0 mg/dLSODIUM 143.0 
mmol/LPOTASSIUM 3.70 mmol/LCHLORIDE 111.0 mmol/LCO2 23.0 mmol/LBUN 10.0 
mg/dLCREATININE 0.80 mg/dLSGOT/AST 12.0 IU/LSGPT/ALT 11.0 IU/LALK PHOS 65.0 
IU/LTOTAL PROTEIN 7.20 g/dLALBUMIN 3.90 g/dLTOTAL BILI 0.50 mg/dLCALCIUM 10.20 
mg/dLAGE 59 GFR NonAA 73 GFR AA 88 eGFR >60 mL/min/1.73 m2eGFR AA* >60 WBC 5.7 
RBC 3.26 HGB 10.60 g/dLHCT 31.70 %MCV 97.0 fLMCH 32.50 pgMCHC 33.40 g/dLRDW SD 
47 RDW CV 13.30 %MPV 9.70 fLPLT 287 NRBC# 0.00 NRBC% 0.0 %NEUT 62.90 %%LYMP 
21.80 %%MONO 9.90 %%EOS 5.0 %%BASO 0.40 %#NEUT 3.56 #LYMP 1.23 #MONO 0.56 #EOS 
0.28 #BASO 0.02 MANUAL DIFF NOT IND 

 

                          2010 12:00 AM        Glucose SerPl-mCnc 96.0 mg/dLCholest Cry Stone Ql .0 %LDLc

 SerPl-mCnc 146.0 mg/dL

 

                          2010 8:26 AM         TRIGLYCERIDES 106.0 mg/dLCHOLESTEROL 199.0 mg/dLHDL 32.0 mg/dLTOT

 CHOL/HDL 6.2 LDL (CALC) 146.0 mg/dLGLUCOSE 96.0 mg/dLSODIUM 143.0 
mmol/LPOTASSIUM 4.0 mmol/LCHLORIDE 113.0 mmol/LCO2 24.0 mmol/LBUN 13.0 
mg/dLCREATININE 1.0 mg/dLSGOT/AST 11.0 IU/LSGPT/ALT 6.0 IU/LALK PHOS 56.0 
IU/LTOTAL PROTEIN 6.60 g/dLALBUMIN 3.80 g/dLTOTAL BILI 0.50 mg/dLCALCIUM 9.30 
mg/dLAGE 59 GFR NonAA 57 GFR AA 69 eGFR 57 eGFR AA* >60 

 

                          10/6/2010 12:00 AM        Cholest Cry Stone Ql .0 %LDLc SerPl-mCnc 111.0 mg/dLGlucose

 SerPl-mCnc 81.0 mg/dL

 

                          10/6/2010 2:45 PM         TRIGLYCERIDES 123.0 mg/dLCHOLESTEROL 178.0 mg/dLHDL 42.0 mg/dLTOT

 CHOL/HDL 4.2 LDL (CALC) 111.0 mg/dLGLUCOSE 81.0 mg/dLSODIUM 139.0 
mmol/LPOTASSIUM 4.10 mmol/LCHLORIDE 106.0 mmol/LCO2 24.0 mmol/LBUN 13.0 
mg/dLCREATININE 0.90 mg/dLSGOT/AST 13.0 IU/LSGPT/ALT 11.0 IU/LALK PHOS 61.0 
IU/LTOTAL PROTEIN 7.10 g/dLALBUMIN 3.90 g/dLTOTAL BILI 0.30 mg/dLCALCIUM 9.30 
mg/dLAGE 60 GFR NonAA 64 GFR AA 78 eGFR >60 mL/min/1.73 m2eGFR AA* >60 WBC 6.9 
RBC 3.59 HGB 11.50 g/dLHCT 35.30 %MCV 98.0 fLMCH 32.0 pgMCHC 32.60 g/dLRDW SD 46
 RDW CV 12.90 %MPV 9.90 fLPLT 311 NRBC# 0.00 NRBC% 0.0 %NEUT 64.90 %%LYMP 22.50 
%%MONO 7.20 %%EOS 5.10 %%BASO 0.30 %#NEUT 4.45 #LYMP 1.54 #MONO 0.49 #EOS 0.35 
#BASO 0.02 MANUAL DIFF NOT IND 

 

                          2011 12:00 AM         Mammogram -Women over 40 Ordered 

 

                          2011 10:25 AM        TRIGLYCERIDES 111.0 mg/dLCHOLESTEROL 195.0 mg/dLHDL 43.0 mg/dLTOT

 CHOL/HDL 4.5 LDL (CALC) 130.0 mg/dLWBC 5.3 RBC 3.76 HGB 12.0 g/dLHCT 37.80 %MCV
 101.0 fLMCH 31.90 pgMCHC 31.70 g/dLRDW SD 47 RDW CV 13.0 %MPV 9.70 fLPLT 259 
NRBC# 0.00 NRBC% 0.0 %NEUT 69.0 %%LYMP 17.60 %%MONO 8.30 %%EOS 4.70 %%BASO 0.40 
%#NEUT 3.63 #LYMP 0.93 #MONO 0.44 #EOS 0.25 #BASO 0.02 MANUAL DIFF NOT IND 
GLUCOSE 102.0 mg/dLSODIUM 146.0 mmol/LPOTASSIUM 4.20 mmol/LCHLORIDE 113.0 
mmol/LCO2 23.0 mmol/LBUN 15.0 mg/dLCREATININE 1.0 mg/dLSGOT/AST 12.0 
IU/LSGPT/ALT 17.0 IU/LALK PHOS 60.0 IU/LTOTAL PROTEIN 6.90 g/dLALBUMIN 4.20 
g/dLTOTAL BILI 0.40 mg/dLCALCIUM 9.70 mg/dLAGE 60 GFR NonAA 57 GFR AA 69 eGFR 57
 eGFR AA* >60 

 

                          2011 11:49 AM        Cholest Cry Stone Ql .0 %LDLc SerPl-mCnc 130.0 mg/dLHDLc

 SerPl-mCnc 43.0 mg/dLTrigl SerPl-mCnc 111.0 mg/dLGlucose SerPl-mCnc 102.0 mg/dL

 

                          2011 11:52 AM        Pap Smear Declined 

 

                          2011 11:28 AM        Lithium 2.080 mmol/LGLUCOSE 102.0 mg/dLSODIUM 135.0 mmol/LPOTASSIUM

 3.90 mmol/LCHLORIDE 106.0 mmol/LCO2 21.0 mmol/LBUN 12.0 mg/dLCREATININE 1.30 
mg/dLCALCIUM 10.70 mg/dLAGE 60 GFR NonAA 42 GFR AA 51 eGFR 42 eGFR AA* 51 

 

                          2011 8:58 AM          Lithium 0.690 mmol/L

 

                          2011 2:38 PM         VITAMIN B12 3483.0 pg/mL

 

                          2013 3:35 PM          WBC 5.1 RBC 3.73 HGB 11.70 g/dLHCT 36.40 %MCV 98.0 fLMCH 31.40

 pgMCHC 32.10 g/dLRDW SD 47 RDW CV 13.10 %MPV 9.80 fLPLT 224 NRBC# 0.00 NRBC% 
0.0 %NEUT 66.80 %%LYMP 19.10 %%MONO 9.0 %%EOS 4.90 %%BASO 0.20 %#NEUT 3.42 #LYMP
 0.98 #MONO 0.46 #EOS 0.25 #BASO 0.01 MANUAL DIFF NOT IND GLUCOSE 88.0 
mg/dLSODIUM 141.0 mmol/LPOTASSIUM 4.10 mmol/LCHLORIDE 110.0 mmol/LCO2 22.0 
mmol/LBUN 22.0 mg/dLCREATININE 1.10 mg/dLCALCIUM 9.80 mg/dLAGE 62 GFR NonAA 50 
GFR AA 61 eGFR 50 eGFR AA* 60 Lithium 0.760 mmol/L

 

                          2013 11:02 AM        TRIGLYCERIDES 106.0 mg/dLCHOLESTEROL 181.0 mg/dLHDL 46.0 mg/dLTOT

 CHOL/HDL 3.9 LDL (CALC) 114.0 mg/dLVITAMIN D 41.10 ng/mL

 

                          10/17/2014 10:10 AM       WBC 5.0 RBC 3.66 HGB 11.60 g/dLHCT 36.80 %.0 fLMCH 31.70

 pgMCHC 31.50 g/dLRDW SD 50 RDW CV 13.50 %MPV 10.10 fLPLT 209 NRBC# 0.00 NRBC% 
0.0 %NEUT 69.20 %%LYMP 21.0 %%MONO 6.40 %%EOS 3.20 %%BASO 0.20 %#NEUT 3.46 #LYMP
 1.05 #MONO 0.32 #EOS 0.16 #BASO 0.01 MANUAL DIFF NOT IND GLUCOSE 100.0 
mg/dLSODIUM 148.0 mmol/LPOTASSIUM 3.90 mmol/LCHLORIDE 114.0 mmol/LCO2 26.0 
mmol/LBUN 12.0 mg/dLCREATININE 1.20 mg/dLSGOT/AST 9.0 IU/LSGPT/ALT <6 IU/LALK 
PHOS 82.0 IU/LTOTAL PROTEIN 6.90 g/dLALBUMIN 4.0 g/dLTOTAL BILI 0.40 
mg/dLCALCIUM 10.50 mg/dLAGE 64 GFR NonAA 45 GFR AA 55 eGFR 45 eGFR AA* 55 
TRIGLYCERIDES 96.0 mg/dLCHOLESTEROL 155.0 mg/dLHDL 38.0 mg/dLTOT CHOL/HDL 4.1 
LDL (CALC) 98.0 mg/dLLithium 0.850 mmol/LCancer Antigen (CA) 125 8.30 U/mL

 

                          2015 10:25 AM        Lithium 0.790 mmol/LWBC 4.8 RBC 3.44 HGB 11.0 g/dLHCT 35.20 

%.0 fLMCH 32.0 pgMCHC 31.30 g/dLRDW SD 53 RDW CV 14.0 %MPV 9.30 fLPLT 210
 NRBC# 0.00 NRBC% 0.0 %NEUT 70.80 %%LYMP 17.20 %%MONO 8.10 %%EOS 3.50 %%BASO 
0.40 %#NEUT 3.41 #LYMP 0.83 #MONO 0.39 #EOS 0.17 #BASO 0.02 MANUAL DIFF NOT IND 
TRIGLYCERIDES 107.0 mg/dLCHOLESTEROL 174.0 mg/dLHDL 43.0 mg/dLTOT CHOL/HDL 4.0 
LDL (CALC) 110.0 mg/dLGLUCOSE 90.0 mg/dLSODIUM 145.0 mmol/LPOTASSIUM 3.80 
mmol/LCHLORIDE 115.0 mmol/LCO2 24.0 mmol/LBUN 17.0 mg/dLCREATININE 1.30 
mg/dLSGOT/AST 18.0 IU/LSGPT/ALT 17.0 IU/LALK PHOS 56.0 IU/LTOTAL PROTEIN 6.70 
g/dLALBUMIN 3.90 g/dLTOTAL BILI 0.40 mg/dLCALCIUM 9.80 mg/dLAGE 64 GFR NonAA 41 
GFR AA 50 eGFR 41 eGFR AA* 50 

 

                          2015 8:50 AM        WBC 5.8 RBC 3.29 HGB 10.70 g/dLHCT 34.0 %.0 fLMCH 32.50

 pgMCHC 31.50 g/dLRDW SD 52 RDW CV 13.60 %MPV 9.60 fLPLT 223 NRBC# 0.00 NRBC% 
0.0 %NEUT 69.60 %%LYMP 18.90 %%MONO 8.50 %%EOS 2.80 %%BASO 0.20 %#NEUT 4.03 
#LYMP 1.09 #MONO 0.49 #EOS 0.16 #BASO 0.01 MANUAL DIFF NOT IND Lithium 0.620 
mmol/LGLUCOSE 83.0 mg/dLSODIUM 139.0 mmol/LPOTASSIUM 3.90 mmol/LCHLORIDE 109.0 
mmol/LCO2 22.0 mmol/LBUN 19.0 mg/dLCREATININE 1.40 mg/dLSGOT/AST 19.0 
IU/LSGPT/ALT 21.0 IU/LALK PHOS 55.0 IU/LTOTAL PROTEIN 6.50 g/dLALBUMIN 3.90 
g/dLTOTAL BILI 0.50 mg/dLCALCIUM 9.60 mg/dLAGE 65 GFR NonAA 38 GFR AA 46 eGFR 38
 eGFR AA* 46 TRIGLYCERIDES 121.0 mg/dLCHOLESTEROL 192.0 mg/dLHDL 51.0 mg/dLTOT 
CHOL/HDL 3.8 .0 mg/dLFREE T4 0.79 TSH 1.210 uIU/mLHemoglobin A1c 5.40 
%Estim. Avg Glu (eAG) 108 

 

                          3/15/2016 8:08 AM         VITAMIN B12 696.0 pg/mL

 

                          3/23/2016 8:26 AM         WBC 7.0 RBC 3.61 HGB 11.80 g/dLHCT 37.70 %.0 fLMCH 32.70

 pgMCHC 31.30 g/dLRDW SD 49 RDW CV 12.50 %MPV 10.0 fLPLT 207 NRBC# 0.00 NRBC% 
0.0 %NEUT 73.60 %%LYMP 16.40 %%MONO 6.60 %%EOS 3.0 %%BASO 0.30 %#NEUT 5.15 #LYMP
 1.15 #MONO 0.46 #EOS 0.21 #BASO 0.02 MANUAL DIFF NOT IND Lithium 0.940 
mmol/LGLUCOSE 108.0 mg/dLSODIUM 143.0 mmol/LPOTASSIUM 4.30 mmol/LCHLORIDE 110.0 
mmol/LCO2 27.0 mmol/LBUN 16.0 mg/dLCREATININE 1.60 mg/dLSGOT/AST 13.0 
IU/LSGPT/ALT 7.0 IU/LALK PHOS 71.0 IU/LTOTAL PROTEIN 6.80 g/dLALBUMIN 4.0 
g/dLTOTAL BILI 0.20 mg/dLCALCIUM 10.40 mg/dLAGE 65 GFR NonAA 32 GFR AA 39 eGFR 
32 eGFR AA* 39 TRIGLYCERIDES 113.0 mg/dLCHOLESTEROL 169.0 mg/dLHDL 42.0 mg/dLTOT
 CHOL/HDL 4.0 LDL (CALC) 104.0 mg/dLFREE T4 0.86 TSH 2.20 uIU/mLHemoglobin A1c 
5.20 %Estim. Avg Glu (eAG) 103 

 

                          3/25/2016 9:17 AM         COLOR YELLOW APPEARANCE CLEAR SPEC GRAV 1.010 pH 7.0 PROTEIN 

NEGATIVE GLUCOSE NEGATIVE mg/dLKETONE NEGATIVE BILIRUBIN NEGATIVE BLOOD NEGATIVE
 NITRITE NEGATIVE [place] SCREEN SMALL MICRO IND? SEE BELOW WBC/HPF 0-5 RBC/HPF 
NEGATIVE CASTS/LPF NEGATIVE /LPFCRYSTALS NEGATIVE MUCOUS THRDS NEGATIVE BACTERIA
 NEGATIVE EPITH CELLS FEW SQUAMOUS /HPFTRICHOMONAS NEGATIVE YEAST NEGATIVE 

 

                          2016 6:00 AM        GLUCOSE 91.0 mg/dLSODIUM 143.0 mmol/LPOTASSIUM 3.60 mmol/LCHLORIDE

 112.0 mmol/LCO2 23.0 mmol/LBUN 22.0 mg/dLCREATININE 1.20 mg/dLSGOT/AST 15.0 
IU/LSGPT/ALT 12.0 IU/LALK PHOS 61.0 IU/LTOTAL PROTEIN 5.40 g/dLALBUMIN 3.10 
g/dLTOTAL BILI 0.40 mg/dLCALCIUM 8.40 mg/dLAGE 66 GFR NonAA 45 GFR AA 55 eGFR 45
 eGFR AA* 55 WBC 3.0 RBC 3.05 HGB 9.80 g/dLHCT 32.10 %.0 fLMCH 32.10 
pgMCHC 30.50 g/dLRDW SD 54 RDW CV 14.20 %MPV 10.10 fLPLT 170 NRBC# 0.00 NRBC% 
0.0 %NEUT 50.70 %%LYMP 32.60 %%MONO 10.50 %%EOS 5.90 %%BASO 0.30 %#NEUT 1.54 
#LYMP 0.99 #MONO 0.32 #EOS 0.18 #BASO 0.01 MANUAL DIFF NOT IND 

 

                          2016 2:09 PM        COLOR YELLOW APPEARANCE CLEAR SPEC GRAV 1.010 pH 5.0 PROTEIN

 30 GLUCOSE NEGATIVE mg/dLKETONE NEGATIVE BILIRUBIN NEGATIVE BLOOD LARGE NITRITE
 NEGATIVE [place] SCREEN MODERATE MICRO INDICATED? SEE BELOW WBC/HPF  RBC/HPF
 20-50 CASTS/LPF NEGATIVE /LPFCRYSTALS NEGATIVE MUCOUS THRDS NEGATIVE BACTERIA 
NEGATIVE EPITH CELLS FEW SQUAMOUS /HPFTRICHOMONAS NEGATIVE YEAST NEGATIVE CULT 
SET UP? YES 

 

                          1/3/2017 4:08 PM          COLOR YELLOW APPEARANCE HAZY SPEC GRAV 1.015 pH 6.0 PROTEIN 30

 GLUCOSE NEGATIVE mg/dLKETONE NEGATIVE BILIRUBIN NEGATIVE BLOOD MODERATE NITRITE
 NEGATIVE [place] SCREEN LARGE MICRO INDICATED? SEE BELOW WBC/-200 RBC/HPF 
5-10 CASTS/LPF NEGATIVE /LPFCRYSTALS NEGATIVE MUCOUS THRDS NEGATIVE BACTERIA 
NEGATIVE EPITH CELLS 1+ SQUAMOUS /HPFTRICHOMONAS NEGATIVE YEAST NEGATIVE CULT 
SET UP? YES 

 

                          2017 4:24 PM         CREATININE 1.50 mg/dLAGE 66 GFR NonAA 35 GFR AA 42 eGFR 35 eGFR

 AA* 42 VITAMIN B12 1324.0 pg/mL

 

                          2017 11:30 AM         GLUCOSE 93.0 mg/dLSODIUM 143.0 mmol/LPOTASSIUM 3.10 mmol/LCHLORIDE

 101.0 mmol/LCO2 29.0 mmol/LBUN 26.0 mg/dLCREATININE 1.50 mg/dLSGOT/AST 23.0 
IU/LSGPT/ALT 13.0 IU/LALK PHOS 66.0 IU/LTOTAL PROTEIN 7.70 g/dLALBUMIN 4.30 
g/dLTOTAL BILI 0.40 mg/dLCALCIUM 10.30 mg/dLAGE 66 GFR NonAA 35 GFR AA 42 eGFR 
35 eGFR AA* 42 TRIGLYCERIDES 147.0 mg/dLCHOLESTEROL 184.0 mg/dLHDL 44.0 mg/dLTOT
 CHOL/HDL 4.2 .0 mg/dLWBC 5.4 RBC 3.98 HGB 12.90 g/dLHCT 40.20 %.0
 fLMCH 32.40 pgMCHC 32.10 g/dLRDW SD 50 RDW CV 13.50 %MPV 9.30 fLPLT 210 NRBC# 
0.00 NRBC% 0.0 %NEUT 54.20 %%LYMP 30.70 %%MONO 9.10 %%EOS 5.20 %%BASO 0.40 
%#NEUT 2.94 #LYMP 1.66 #MONO 0.49 #EOS 0.28 #BASO 0.02 MANUAL DIFF NOT IND FREE 
T4 1.09 COLOR YELLOW APPEARANCE CLEAR SPEC GRAV <=1.005 pH 5.5 PROTEIN NEGATIVE 
GLUCOSE NEGATIVE mg/dLKETONE NEGATIVE BILIRUBIN NEGATIVE BLOOD NEGATIVE NITRITE 
NEGATIVE [place]UK SCREEN LARGE MICRO INDICATED? SEE BELOW WBC/HPF 10-20 RBC/HPF 0-5 
CASTS/LPF NEGATIVE /LPFCRYSTALS NEGATIVE MUCOUS THRDS NEGATIVE BACTERIA FEW 
EPITH CELLS 1+ SQUAMOUS /HPFTRICHOMONAS NEGATIVE YEAST NEGATIVE CULT SET UP? YES
 TSH 1.820 uIU/mL







History Of Immunizations







       Name    Date Admin    Mfg Name    Mfg Code    Trade Name    Lot#    Route    Inj    Vis Given    Vis

 Pub                                    CVX

 

        Influenza    2008    Not Entered    NE      Not Entered            Not Entered    Not Entered

                    1            999

 

        X       12/19/2008    [employer]    MSD     Pneumovax 23            Intramuscular    Not Entered

                    1            999

 

           Influenza    10/15/2010    [employer] [facility].    NOV        Fluvirin > 12 Years    

[id number]     Intramuscular    Left Deltoid    10/15/2010    2009    999

 

          X         3/23/2015    [facility]    [facility]       Prevnar 13    [id number]    Intramuscular

                Right Gluteous Medius    3/23/2015       2013       109







History of Past Illness







                    Name                Date of Onset       Comments

 

                    Peritoneal Neoplasm, Malignant                         

 

                    Hyperlipidemia                           

 

                    Bipolar disorder, unspecified                         

 

                    Artificial opening status; colostomy                         

 

                    B12 deficiency                           

 

                    Anemia, Pernicious                         

 

                    Arthritis unspecified                         

 

                    cervical cancer                          

 

                    Artificial opening status; colostomy    2010  1:10PM     

 

                    Bipolar disorder, unspecified    2010  1:10PM     

 

                    Hyperlipidemia      2010  1:10PM     

 

                    Anemia, Pernicious    2010  1:10PM     

 

                    Postoperative Follow-Up    2010  1:55PM     

 

                    Postoperative Follow-Up    Mar  8 2010 10:57AM     

 

                    Artificial opening status; colostomy    Mar  8 2010  1:19PM     

 

                    Peritoneal Neoplasm, Malignant    Mar  8 2010  1:19PM     

 

                    Artificial opening status; colostomy    2010  1:40PM     

 

                    Hyperlipidemia      2010  1:40PM     

 

                    Anemia, Pernicious    2010  1:40PM     

 

                    Peritoneal Neoplasm, Malignant    2010  1:40PM     

 

                    Arthritis unspecified    2010  1:40PM     

 

                    Anemia of Chronic Illness    2010  1:40PM     

 

                    Tinea corporis      2010  3:17PM     

 

                    Bipolar disorder, unspecified    2010  1:33PM     

 

                    Hyperlipidemia      2010  1:33PM     

 

                    Anemia, Pernicious    2010  1:33PM     

 

                    Peritoneal Neoplasm, Malignant    2010  1:33PM     

 

                    B12 deficiency      2010  1:33PM     

 

                    Ethmoidal Sinusitis, Acute    Sep 21 2010  3:53PM     

 

                    Wheezing            Sep 21 2010  3:53PM     

 

                    Flu                 Oct 15 2010  1:40PM     

 

                    Bipolar disorder, unspecified    Oct 15 2010  1:42PM     

 

                    Hyperlipidemia      Oct 15 2010  1:42PM     

 

                    Anemia, Pernicious    Oct 15 2010  1:42PM     

 

                    Peritoneal Neoplasm, Malignant    Oct 15 2010  1:42PM     

 

                    Bipolar disorder, unspecified    2011 12:01PM     

 

                    Hyperlipidemia      2011 12:01PM     

 

                    Anemia, Pernicious    2011 12:01PM     

 

                    Peritoneal Neoplasm, Malignant    2011 12:01PM     

 

                    Bipolar disorder, unspecified    Apr 15 2011 10:55AM     

 

                    Major Depression    2011 10:11AM     

 

                    Bipolar Disorder    2011 10:11AM     

 

                    Cancer              May 10 2011  4:16PM     

 

                    Major Depression    May 10 2011  3:16PM     

 

                    Bipolar Disorder    May 10 2011  3:16PM     

 

                    Hypercalcemia       May 23 2011  2:47PM     

 

                    Bipolar disorder, unspecified    May 23 2011  2:47PM     

 

                    Colon Cancer, Personal History    May 23 2011  2:47PM     

 

                    Bipolar Disorder    May 31 2011  4:39PM     

 

                    Depressive Disorder    2011 10:01AM     

 

                    Vitamin B12 deficiency    2011 10:01AM     

 

                    Vitamin D Deficiency    2011  5:07PM     

 

                    Anemia, Vitamin B12 Deficiency    2011  5:07PM     

 

                    B12 deficiency      2011  3:56PM     

 

                    Routine gynecological examination    Aug  4 2011  9:08AM     

 

                    Screening Examination for Breast Cancer    Aug  4 2011  9:08AM     

 

                    Tinea Corporis      Aug  4 2011  9:08AM     

 

                    Depressive Disorder    Sep 23 2011  8:47AM     

 

                    Contact Dermatitis    Sep 23 2011  8:47AM     

 

                    Anemia, Pernicious    Sep 23 2011  8:47AM     

 

                    B12 deficiency      Sep 23 2011  8:47AM     

 

                    B12 deficiency      Sep 27 2011  2:58PM     

 

                    B12 deficiency      Oct 20 2011  2:34PM     

 

                    Flu                 Dec  9 2011  3:16PM     

 

                    B12 deficiency      Dec  9 2011  3:17PM     

 

                    B12 deficiency      2012  4:52PM     

 

                    B12 deficiency      Feb 2012 11:10AM     

 

                    B12 deficiency      2012  3:37PM     

 

                    B12 deficiency      May  3 2012  4:10PM     

 

                    B12 deficiency      2012  2:54PM     

 

                    B12 deficiency      2012 11:23AM     

 

                    B12 deficiency      Aug  9 2012  2:08PM     

 

                    B12 deficiency      Sep  6 2012  4:36PM     

 

                    B12 deficiency      Oct 16 2012 10:23AM     

 

                    Flu                 Feb  4   3:11PM     

 

                    Bipolar disorder, unspecified    Feb  4   2:48PM     

 

                    Anemia, Pernicious    Feb  4   2:48PM     

 

                    B12 deficiency      Feb  4   2:48PM     

 

                    Extrapyramidal abnormal movement disorder    Feb  4   2:48PM     

 

                    B12 deficiency      Apr  3 2013 12:03PM     

 

                    Bipolar disorder, unspecified    May  7 2013  1:31PM     

 

                    Anemia, Pernicious    May  7 2013  1:31PM     

 

                    B12 deficiency      May  7 2013  1:31PM     

 

                    Extrapyramidal abnormal movement disorder    May  7 2013  1:31PM     

 

                    B12 deficiency      2013  3:42PM     

 

                    B12 deficiency      2013  1:31PM     

 

                    Hyperlipidemia      Aug  7 2013 10:37AM     

 

                    Vitamin D Deficiency    Aug  7 2013 10:37AM     

 

                    Bipolar disorder, unspecified    Aug  7 2013 10:37AM     

 

                    Anemia, Pernicious    Aug  7 2013 10:37AM     

 

                    B12 deficiency      Aug  7 2013 10:37AM     

 

                    B12 deficiency      Sep 25 2013 11:15AM     

 

                    B12 deficiency      Dec 11 2013  3:16PM     

 

                    B12 deficiency      Mar  6 2014  1:48PM     

 

                    B12 deficiency      May 21 2014  3:17PM     

 

                    Screening Examination for Breast Cancer    2014  3:23PM     

 

                    Periumbilical abdominal pain    2014  3:23PM     

 

                    B12 deficiency      Jul 10 2014  2:52PM     

 

                    Anemia, Vitamin B12 Deficiency    Aug 13 2014  4:50PM     

 

                    Bipolar disorder    Oct 16 2014 11:13AM     

 

                    Hyperlipidemia      Oct 16 2014 11:13AM     

 

                    Anemia, Pernicious    Oct 16 2014 11:13AM     

 

                    Peritoneal Neoplasm, Malignant    Oct 16 2014 11:13AM     

 

                    Screening breast examination    Oct 16 2014 11:13AM     

 

                    Weight loss         Oct 16 2014 11:13AM     

 

                    Anemia, Pernicious    Mar 23 2015  2:57PM     

 

                    B12 deficiency      Mar 23 2015  2:57PM     

 

                    Need for Prevnar vaccine    Mar 23 2015  2:57PM     

 

                    Bipolar disorder    Mar 23 2015  2:57PM     

 

                    Hyperlipidemia      Mar 23 2015  2:57PM     

 

                    Anemia, Pernicious    Mar 23 2015  2:57PM     

 

                    Peritoneal Neoplasm, Malignant    Mar 23 2015  2:57PM     

 

                    B12 deficiency      May  4 2015  4:48PM     

 

                    Hyperlipidemia      May 13 2015  9:56AM     

 

                    Anemia              May 13 2015  9:56AM     

 

                    Bipolar disorder    May 13 2015  9:56AM     

 

                    Bipolar disorder    May 14 2015  3:27PM     

 

                    Hyperlipidemia      May 14 2015  3:27PM     

 

                    Anemia, Pernicious    May 14 2015  3:27PM     

 

                    Peritoneal Neoplasm, Malignant    May 14 2015  3:27PM     

 

                    B12 deficiency      2015  2:20PM     

 

                    B12 deficiency      2015 11:34AM     

 

                    B12 deficiency      Aug 18 2015  9:06AM     

 

                    Tinea Corporis      Sep 18 2015  8:54AM     

 

                    B12 deficiency      Sep 18 2015  8:54AM     

 

                    B12 deficiency      2015 10:28AM     

 

                    Herpes zoster without complication    Dec  3 2015  9:52AM     

 

                    B12 deficiency      Dec 23 2015 11:21AM     

 

                    B12 deficiency      2016  4:51PM     

 

                    Vitamin B 12 deficiency    Mar 14 2016  5:35PM     

 

                    B12 deficiency      Mar 15 2016 12:14PM     

 

                    B12 deficiency      May  5 2016 11:30AM     

 

                    Edema               May  5 2016 11:30AM     

 

                    Dermatitis          May  5 2016 11:30AM     

 

                    Edema               May 17 2016  8:38AM     

 

                    Shortness of breath    May 17 2016  8:38AM     

 

                    Bilateral edema of lower extremity    2016  2:06PM     

 

                    B12 deficiency      2016  2:06PM     

 

                    B12 deficiency      2016 11:50AM     

 

                    B12 deficiency      2016 11:20AM     

 

                    Diarrhea            Aug  2 2016  3:13PM     

 

                    B12 deficiency      Aug 24 2016 11:10AM     

 

                    Encounter for screening mammogram for breast cancer    Aug 24 2016 11:44AM     

 

                    B12 deficiency      Sep 28 2016  2:35PM     

 

                    B12 deficiency      Dec 15 2016  2:02PM     

 

                    Dysuria             Dec 29 2016 12:14PM     

 

                    Hematuria           Fidencio  3 2017  1:33PM     

 

                    Constipation by delayed colonic transit    2017  1:52PM     

 

                    Ileus               2017  1:52PM     

 

                    UTI (urinary tract infection)    Fidencio 15 2017  3:39PM     

 

                    Acute cystitis with hematuria    2017 11:07AM     

 

                    B12 deficiency      2017 11:07AM     

 

                    B12 deficiency      2017 11:40AM     

 

                    B12 deficiency      2017  4:07PM     

 

                    Slurred speech      2017  3:07PM     

 

                    Vitamin B12 deficiency    2017  3:07PM     

 

                    Dysphagia, unspecified type    2017  3:07PM     

 

                    Hyperlipidemia      2017  3:07PM     







Payers







           Insurance Name    Company Name    Plan Name    Plan Number    Policy Number    Policy Group

 Number                                 Start Date

 

                    Medicare RHC Medicare RHC              [id number]              N/A

 

                          [employer]    [employer]                 [id number]

                                                    

 

                    Medicare Part A    Medicare - Lab/Xray              [id number]              2006

 

                    Medicare Part B    Medicare [place]              [id number]              2006

 

                          [employer]    [employer]                 50 percent

                                                    2009

 

                    [facility]    [employer]              [id number]              N/A

 

                    Medicare Part A    Medicare Part A              [id number]              N/A

 

                    Medicare Part A    Medicare Part A              [id number]              2006









History of Encounters







                    Visit Date          Visit Type          Provider

 

                    2017           Office visit         

 

                    2017           Office visit        Bhupinder Louise DO

 

                    2017           Nurse visit         hBupinder Louise DO

 

                    2017           Office visit        Radha Ontiveros APRN

 

                    1/15/2017           Office visit        Aj Tapia NP

 

                    2017            Office visit        Devin Masterson MD

 

                    2016          [facility]            Devin Masterson MD

 

                    12/15/2016          Nurse visit         Bhupinder Louise DO

 

                    2016           Nurse visit         Bret GREEN

 

                    2016           Nurse visit         Bhupinder Louise DO

 

                    2016            Office visit        Bhupinder Louise DO

 

                    2016           Nurse visit         Bhupinder Louise DO

 

                    2016           Office visit        Bret GREEN

 

                    2016            Office visit        Bhupinder Aspen DO

 

                    3/15/2016           Nurse visit         Bhupinder Aspen DO

 

                    2016            Nurse visit         Bhupinder Aspen DO

 

                    2015          Nurse visit         Bhupinder Aspen DO

 

                    12/3/2015           Office visit        Bhupinder Aspen DO

 

                    2015          Nurse visit         Bhupinder Sapen DO

 

                    2015           Office visit        Bhupinder Aspen DO

 

                    2015           Nurse visit         Bhupinder Aspen DO

 

                    2015            Nurse visit         Bhupinder Aspen DO

 

                    2015            Nurse visit         Bhupinder Aspen DO

 

                    2015           Office visit        Bhupinder Aspen DO

 

                    2015            Nurse visit         Bhupinder Aspen DO

 

                    3/23/2015           Office visit        Bhupinder Aspen DO

 

                    10/16/2014          Office visit        Bhupinder Aspen DO

 

                    2014           Nurse visit         Radha Ontiveros APRN

 

                    7/10/2014           Nurse visit         Bhupinder Aspen DO

 

                    2014           Office visit        Bhupinder Aspen DO

 

                    2014           Nurse visit         Bhupinder Aspen DO

 

                    3/6/2014            Nurse visit         Bhupinder Aspen DO

 

                    2014            [facility]            EARNEST Lopez MD

 

                    2013          Nurse visit         Bhupinder Aspen DO

 

                    2013           Nurse visit         Bhupinder Aspen DO

 

                    2013            Office visit        Bhupinder Aspen DO

 

                    2013            Nurse visit         Bhupinder Aspen DO

 

                    2013            Nurse visit         Bhupinder Aspen DO

 

                    2013            Office visit        Bhupinder Aspen DO

 

                    4/3/2013            Nurse visit         Bhupinder Aspen DO

 

                    2013            Office visit        Bhupinder Aspen DO

 

                    10/16/2012          Nurse visit         Bhupinder Aspen DO

 

                    2012            Nurse visit         Bhupinder Aspen DO

 

                    2012            Voided              Bhupinder Aspen DO

 

                    2012            Nurse visit         Bhupinder Aspen DO

 

                    2012            Nurse visit         Bhupinder Aspen DO

 

                    2012           Nurse visit         Bhupinder Aspen DO

 

                    5/3/2012            Nurse visit         Bhupinder Aspen DO

 

                    2012           Nurse visit         Bhupinder Aspen DO

 

                    2012           Nurse visit         Bhupinder Aspen DO

 

                    2012           Nurse visit         Bhupinder Aspen DO

 

                    2011           Nurse visit         Bhupinder Louise DO

 

                    10/20/2011          Nurse visit         Bhupinder Louise DO

 

                    2011           Office visit        Bhupinder Aspen DO

 

                    2011           Nurse visit         Radha GREEN

 

                    2011            Office visit        Bhupinder Aspen DO

 

                    2011           Nurse visit         Bhupinder Aspen DO

 

                    2011            Office visit        Bhupinder Aspen DO

 

                    2011           Office visit        Bhupinder Aspen DO

 

                    5/10/2011           Office visit        Bhupinder Aspen DO

 

                    2011           Office visit        Bhupinder Louise DO

 

                    4/15/2011           Office visit        Devin Angel DO

 

                    2011           Office visit        Devin Angel DO

 

                    10/15/2010          Office visit        Devin Angel DO

 

                    2010           Office visit        Devin Angel DO

 

                    2010            Office visit        Devin Angel DO

 

                    2010           Office visit        Devin Angel DO

 

                    2010            Office visit        Devin Angel DO

 

                    3/8/2010            Office visit        Devin Masterson MD

 

                    2010            Surgery             Devin Masterson MD

 

                    2010            Office visit        Devin Angel DO

 

                    2010           Surgery             Devin Masterson MD

 

                    2010           Hospital            Devin Masterson MD

 

                    2010           Hospital            Devin Masterson MD

 

                    10/22/2009          Office visit        Devin Angel DO

## 2019-06-26 NOTE — XMS REPORT
MU2 Ambulatory Summary

                             Created on: 2017



Pauline Gan

External Reference #: [id number]

: 1950

Sex: Female



Demographics







                          Address                   [address]

[place]  [id number]

 

                          Home Phone                [phone]

 

                          Preferred Language        English

 

                          Marital Status            Legally 

 

                          Sabianism Affiliation     Unknown

 

                          Race                      White

 

                          Ethnic Group              Not  or 





Author







                          Bhupinder Aguilar

 

                          [facility]

 

                          Address                   [address]

[place]  [id number]



 

                          Phone                     [phone]







Care Team Providers







                    Care Team Member Name    Role                Phone

 

                    Bhupinder Louise    PCP                 Unavailable

 

                    Bhupinder Louise    PreferredProvider    Unavailable







Allergies and Adverse Reactions







                    Name                Reaction            Notes

 

                    NO KNOWN DRUG ALLERGIES                         







Plan of Treatment







             Planned Activity    Comments     Planned Date    Planned Time    Plan/Goal

 

             Injection, Subcutaneous/IM                 2016    12:00 AM      

 

             Injection, Subcutaneous/IM                 2016    12:00 AM      

 

             Mammography; bilateral                 2016    12:00 AM      

 

             Injection, Subcutaneous/IM                 2016    12:00 AM      

 

             URINALYSIS ROUTINE C&S IF IND                 1/3/2017     12:00 AM      

 

             CBC with Auto                 2013     12:00 AM      

 

             Lithium                   2013     12:00 AM      

 

             Basic metabolic panel                 2013     12:00 AM      

 

             Vitamin B12 (cyanocobalamin)                 2013     12:00 AM      

 

             Folic acid, serum                 2013     12:00 AM      

 

             Injection,Subcutaneous/Intramuscul                 2013    12:00 AM      







Medications







                                        Active 

 

             Name         Start Date    Estimated Completion Date    SIG          Comments

 

                Latuda 20 mg oral tablet                                    take 1 tablet (20 mg) by oral route once daily with

 food (at least 350 calories)            

 

             pravastatin 40 mg oral tablet    3/30/2015                 TAKE 1 TABLET BY MOUTH DAILY     

 

                Namenda XR 28 mg oral capsule,sprinkle,ER 24hr    2015                       take 1 capsule (28

 mg) by oral route once daily            

 

                Namenda XR 28 mg oral capsule,sprinkle,ER 24hr    2016                       take 1 capsule (28

 mg) by oral route once daily            

 

                triamcinolone acetonide 0.1 % topical cream    2016                        apply a thin layer to 

the affected area(s) by topical route 2 times per day     

 

                potassium chloride 10 mEq oral tablet extended release    2016                       take 1 tablet

 (10 meq) by oral route once daily       

 

             pravastatin 40 mg oral tablet    2016                 TAKE 1 TABLET BY MOUTH DAILY     

 

                Vitamin B-12 1,000 mcg/mL injection solution    2016                       inject 1 milliliter 

(1,000 mcg) by intramuscular route once a month     

 

                potassium chloride 10 mEq oral tablet extended release    2016                      take 1 tablet

 (10 meq) by oral route once daily       

 

                Namenda XR 28 mg oral capsule,sprinkle,ER 24hr    2016                      TAKE 1 CAPSULE BY

 MOUTH EVERY DAY                         

 

                furosemide 40 mg oral tablet    2016                      take 1 tablet (40 mg) by oral route

 once daily                              

 

                Bactrim -160 mg oral tablet    2016        take 1 tablet by oral route

 every 12 hours for 7 days               









                                         

 

             Name         Start Date    Expiration Date    SIG          Comments

 

             Reglan 10mg    3/29/2010    2010    one ac and hs     

 

                Keflex 500 mg oral capsule    2010       10/1/2010       take 1 capsule (500 mg) by oral

 route every 6 hours for 10 days         

 

                Bactrim -160 mg oral tablet    2011       take 1 tablet by oral route

 every 12 hours for 7 days               

 

                triamcinolone acetonide 0.1 % topical cream    2011      apply a thin

 layer to the affected area(s) by topical route 2 times per day     

 

                sertraline 100 mg oral tablet    4/10/2012       5/10/2012       take 1.5 tablets by oral route

 daily for 30 days                       

 

                ergocalciferol (vitamin D2) 50,000 unit oral capsule    4/15/2013       2013       TAKE

 ONE CAPSULE BY MOUTH ONCE A WEEK        

 

                CYANOCOBALAM 1000MCGINJ 1000 milliliter    2013       INJECT 1ML INTRAMUSCULAR

 ONCE A MONTH                            

 

                pravastatin 40 mg oral tablet    3/25/2014       3/20/2015       TAKE ONE TABLET BY MOUTH EVERY

 DAY                                     

 

                          Zostavax (PF) 19,400 unit/0.65 mL subcutaneous suspension for reconstitution    3/23/2015

                    3/24/2015           inject 0.65 milliliter by subcutaneous route once     

 

                famciclovir 500 mg oral tablet    12/3/2015       12/10/2015      take 1 tablet (500 mg) by

 oral route every 8 hours for 7 days     

 

                furosemide 40 mg oral tablet    2016      take 1 tablet (40 mg) by oral

 route once daily                        

 

                Cipro 500 mg oral tablet    2016       take 1 tablet (500 mg) by oral route

 2 times per day for 5 days              









                                        Discontinued 

 

             Name         Start Date    Discontinued Date    SIG          Comments

 

                Tylenol 325 mg oral tablet                    2013        take 1 - 2 tablets (325 -650 mg) by oral

 route every 4-6 hours as needed         

 

                Calcium 600 + D(3) 600 mg(1,500mg) -400 unit oral tablet                    2011       take 1 tablet

 by oral route 2 times a day            no longer taking

 

                Vitamin B-12 1,000 mcg oral tablet extended release    2010       take 1

 tablet by oral route daily             no longer taking

 

                Antifungal (clotrimazole) 1 % topical cream    2010       apply to the 

affected and surrounding areas of skin by topical route 2 times per day morning 
and evening                              

 

                sertraline 100 mg oral tablet    5/10/2011       2011       take 2 tablets (200 mg) by 

oral route once daily                   discontinued by Dr. Serrano

 

                mirtazapine 15 mg oral tablet                    2011        take 1 tablet (15 mg) by oral route 

once daily before bedtime               Dr. Serrano

 

                mirtazapine 15 mg oral tablet                    2011        take 1 tablet (15 mg) by oral route 

once daily before bedtime               dc'd by Dr. Serrano

 

                Pristiq 50 mg oral tablet extended release 24 hr                    2013        take 1 tablet (50

 mg) by oral route once daily           Dr. Serrano

 

                Pristiq 50 mg oral tablet extended release 24 hr                    2013        take 1 tablet (50

 mg) by oral route once daily           dose updated

 

                Vitamin B-12 1,000 mcg/mL injection solution    2011        inject 1 milliliter

 (1,000 mcg) by intramuscular route once a month    on list already

 

                    syringe with needle 1 mL 25 gauge x 1" miscellaneous syringe    2011

                          use for injection once a month     

 

                clotrimazole 1 % topical cream    2011        apply to the affected and surrounding

 areas of skin by topical route 2 times per day in the morning and evening     

 

                Vitamin D2 50,000 unit oral capsule    2011        take 1 capsule (50,000

 unit) by oral route once weekly        generic on list

 

                Pravachol 40 mg oral tablet    2012        take 1 tablet (40 mg) by oral 

route once daily for 90 days            generic on list

 

                lithium carbonate 300 mg oral capsule    2012        take 1 capsule by oral

 route daily                            dose updated

 

                Pristiq 100 mg oral tablet extended release 24 hr                    4/10/2012       take 1 and 1/2 

tablet (150 mg) by oral route once daily    Mental Health provider

 

                Pristiq 100 mg oral tablet extended release 24 hr                    4/10/2012       take 1 and 1/2 

tablet (150 mg) by oral route once daily    Discontinued by Dr Efrain [facility] at [facility]

 

                hydroxyzine HCl 50 mg oral tablet    10/16/2014      2015       take 1 tablet (50 mg) 

by oral route at bedtime                 

 

                lithium carbonate 300 mg oral capsule    2015       take 1 capsule (300

 mg) by oral route 2 for 30 days         

 

                fluconazole 100 mg oral tablet    2015       12/3/2015       take 1 tablet (100 mg) by 

oral route once a week                   

 

                ketoconazole 2 % topical cream    2015       12/3/2015       apply to the affected area(s)

 by topical route 2 times per day        

 

                prednisone 10 mg oral tablet    12/3/2015       2016        take 2 tablets (20 mg) by oral

 route once daily for 4 days 1 tablet daily for 4 days 0.5 tablet daily for 4 
days                                     







Problem List







                    Description         Status              Onset

 

                    Artificial opening status; colostomy    Active               

 

                    Bipolar disorder, unspecified    Active               

 

                    Hyperlipidemia      Active               

 

                    Peritoneal Neoplasm, Malignant    Active               

 

                    Anemia, Pernicious    Active               

 

                    Arthritis unspecified    Active               

 

                    B12 deficiency      Active               







Vital Signs







      Date    Time    BP-Sys(mm[Hg]    BP-Lynn(mm[Hg])    HR(bpm)    RR(rpm)    Temp    WT    HT    HC    BMI

                    BSA                 BMI Percentile      O2 Sat(%)

 

       2016    3:11:00 PM    134 mmHg    76 mmHg    80 bpm    20 rpm    98 F    163 lbs    69 in     

                24.07 kg/m2     1.90 m2                         98 %

 

        2016    2:04:00 PM    142 mmHg    86 mmHg    68 bpm    16 rpm    98.5 F    166 lbs    63 in

                          29.4053 kg/m    1.8295 m                 100 %

 

        2016    11:27:00 AM    148 mmHg    78 mmHg    90 bpm    20 rpm    98.2 F    153 lbs    69 in

                          22.59 kg/m2    1.84 m2                   96 %

 

        12/3/2015    9:50:00 AM    132 mmHg    70 mmHg    62 bpm    16 rpm    97.9 F    145 lbs    69 in

                          21.4125 kg/m    1.7894 m                 100 %

 

        2015    8:52:00 AM    132 mmHg    68 mmHg    52 bpm    20 rpm    97.8 F    141 lbs    69 in

                          20.82 kg/m2    1.76 m2                   100 %

 

        2015    3:25:00 PM    120 mmHg    62 mmHg    72 bpm    16 rpm    98.1 F    136 lbs    69 in

                          20.0835 kg/m    1.733 m                 98 %

 

       3/23/2015    2:55:00 PM    130 mmHg    76 mmHg    68 bpm    18 rpm    97 F    140 lbs    69 in    

                20.67 kg/m2     1.76 m2                         98 %

 

        10/16/2014    11:11:00 AM    120 mmHg    66 mmHg    77 bpm    20 rpm    98 F    130 lbs    69 in

                          19.1974 kg/m    1.6943 m                 100 %

 

        2014    3:21:00 PM    130 mmHg    66 mmHg    63 bpm    18 rpm    97.2 F    160 lbs    69 in

                          23.63 kg/m2    1.88 m2                   99 %

 

        2013    10:35:00 AM    132 mmHg    70 mmHg    66 bpm    20 rpm    98.1 F    157 lbs    69 in

                          23.1846 kg/m    1.862 m                  

 

        2013    1:29:00 PM    132 mmHg    70 mmHg    76 bpm    18 rpm    98.2 F    166 lbs    69 in 

                          24.51 kg/m2    1.91 m2                    

 

       2013    2:46:00 PM    128 mmHg    70 mmHg    76 bpm    16 rpm    98 F    160 lbs    69 in     

                23.6276 kg/m    1.8797 m                       

 

        2011    8:49:00 AM    128 mmHg    78 mmHg    70 bpm    18 rpm    97.9 F    164 lbs    69 in

                          24.22 kg/m2    1.90 m2                    

 

     2011    1:31:00 PM    132 mmHg    68 mmHg    84 bpm         97 F    167 lbs                        

                                         

 

        2011    9:09:00 AM    128 mmHg    70 mmHg    72 bpm    18 rpm    98.2 F    163 lbs    64 in 

                          27.98 kg/m2    1.83 m2                    

 

       2011    10:01:00 AM    132 mmHg    70 mmHg    72 bpm    18 rpm    98.2 F    154 lbs             

                                                                 

 

       2011    2:47:00 PM    128 mmHg    70 mmHg    72 bpm    18 rpm    97.8 F    156 lbs             

                                                                 

 

       5/10/2011    3:16:00 PM    144 mmHg    80 mmHg    72 bpm    18 rpm    98.2 F    158 lbs             

                                                                 

 

        2011    10:11:00 AM    132 mmHg    70 mmHg    70 bpm    18 rpm    98.2 F    168 lbs    69 in

                          24.81 kg/m2    1.93 m2                    

 

        4/15/2011    10:52:00 AM    110 mmHg    60 mmHg    75 bpm    16 rpm    97.5 F    172.375 lbs    

69 in                     25.4551 kg/m    1.951 m                 100 %

 

        2011    11:43:00 AM    120 mmHg    82 mmHg    75 bpm    16 rpm    97.2 F    178.5 lbs    69

 in                       26.36 kg/m2    1.99 m2                   100 %

 

        10/15/2010    1:32:00 PM    120 mmHg    70 mmHg    80 bpm    18 rpm    96.6 F    177 lbs    69 in

                          26.1381 kg/m    1.977 m                 100 %

 

        2010    3:50:00 PM    168 mmHg    100 mmHg    82 bpm    18 rpm    97.8 F    177.5 lbs    69

 in                       26.21 kg/m2    1.98 m2                   97 %

 

        2010    1:21:00 PM    140 mmHg    80 mmHg    59 bpm    16 rpm    97.6 F    173.25 lbs    69 

in                        25.5843 kg/m    1.956 m                 100 %

 

        2010    3:02:00 PM    140 mmHg    80 mmHg    61 bpm    16 rpm    97.6 F    173.125 lbs    69

 in                       25.57 kg/m2    1.96 m2                   99 %

 

        2010    1:23:00 PM    130 mmHg    80 mmHg    66 bpm    16 rpm    96.8 F    173 lbs    69 in 

                          25.5474 kg/m    1.9546 m                 100 %

 

        2010    12:58:00 PM    130 mmHg    88 mmHg    75 bpm    16 rpm    98.4 F    172.25 lbs    69

 in                       25.44 kg/m2    1.95 m2                   100 %







Social History







                    Name                Description         Comments

 

                    denies alcohol use                         

 

                    denies smoking                           

 

                    Denies illicit substance abuse                         

 

                    retired                                 direct care

 

                    Single                                   

 

                    Exercises regularly                         

 

                    Attended some college                         

 

                    Tobacco             Never smoker         







History of Procedures







                    Date Ordered        Description         Order Status

 

                    2010 12:00 AM    COMPREHEN METABOLIC PANEL    Reviewed

 

                    2010 12:00 AM    COMPLETE CBC W/AUTO DIFF WBC    Reviewed

 

                    2010 12:00 AM    LIPID PANEL         Reviewed

 

                          2015 12:00 AM        B12 Injection, Up to 1000 Mcg NDC#1246-6826-52 [facility] 

                                        Reviewed

 

                    2011 12:00 AM    MAMMOGRAM SCREENING    Reviewed

 

                    2011 12:00 AM    CYTOPATH C/V THIN LAYER    Reviewed

 

                    2011 12:00 AM    B12 Injection 1 cc NDC#[address]-01    Reviewed

 

                    2015 12:00 AM    THER/PROPH/DIAG INJ SC/IM    Reviewed

 

                    2015 12:00 AM    B12 Injection, Up to 1000 Mcg NDC#4167-9552-76    Reviewed

 

                    2011 12:00 AM    THER/PROPH/DIAG INJ SC/IM    Reviewed

 

                    2011 12:00 AM    B12 Injection([place]) Ndc#8986-9712-33-    Reviewed

 

                    2015 12:00 AM    THER/PROPH/DIAG INJ SC/IM    Reviewed

 

                    2015 12:00 AM    B12 Injection, Up to 1000 Mcg NDC#2647-8470-57    Reviewed

 

                    10/20/2011 12:00 AM    THER/PROPH/DIAG INJ SC/IM    Reviewed

 

                    10/20/2011 12:00 AM    B12 Injection([place]) Ndc#7126-8024-77-    Reviewed

 

                    2016 12:00 AM    THER/PROPH/DIAG INJ SC/IM    Reviewed

 

                    2016 12:00 AM    B12 Injection, Up to 1000 Mcg NDC#1770-2687-04    Reviewed

 

                    3/14/2016 12:00 AM    VITAMIN B-12        Reviewed

 

                    3/15/2016 12:00 AM    THER/PROPH/DIAG INJ SC/IM    Reviewed

 

                    3/15/2016 12:00 AM    B12 Injection, Up to 1000 Mcg NDC#2736-5449-60    Reviewed

 

                    2011 12:00 AM    ***Immunization administration, Medicare flu    Reviewed

 

                    2011 12:00 AM    Fluzone ** MEDICARE Only **    Reviewed

 

                    2011 12:00 AM    THER/PROPH/DIAG INJ SC/IM    Reviewed

 

                    2011 12:00 AM    B12 Injection ([employer] [facility]) Ndc#2791-3859-51    Reviewed

 

                    2016 12:00 AM    B12 Injection, Up to 1000 Mcg NDC#1282-2171-59 [facility] Medicare    

Reviewed

 

                    2016 12:00 AM    TTE W/DOPPLER COMPLETE    Reviewed

 

                    2016 12:00 AM    EXTREMITY STUDY     Returned

 

                          2016 12:00 AM        B12 Injection, Up to 1000 Mcg NDC#5937-4646-36 [facility] Medicare 

                                        Reviewed

 

                    2016 12:00 AM    THER/PROPH/DIAG INJ SC/IM    Reviewed

 

                    2012 12:00 AM    THER/PROPH/DIAG INJ SC/IM    Reviewed

 

                    2012 12:00 AM    B12 Injection ([employer]) Ndc#7531-0699-74    Reviewed

 

                    2016 12:00 AM    THER/PROPH/DIAG INJ SC/IM    Reviewed

 

                    2016 12:00 AM    B12 Injection, Up to 1000 Mcg NDC#5990-4680-63    Reviewed

 

                    2012 12:00 AM    THER/PROPH/DIAG INJ SC/IM    Reviewed

 

                    2012 12:00 AM    B12 Injection([place]) Ndc#2379-1193-04-    Reviewed

 

                    12/15/2016 12:00 AM    B12 Injection, Up to 1000 Mcg NDC#7574-2192-67    Reviewed

 

                    12/15/2016 12:00 AM    THER/PROPH/DIAG INJ SC/IM    Reviewed

 

                    2016 12:00 AM    URNLS DIP STICK/TABLET RGNT AUTO W/O MICROSCOPY    Returned

 

                    5/3/2012 12:00 AM    THER/PROPH/DIAG INJ SC/IM    Reviewed

 

                    5/3/2012 12:00 AM    B12 Injection([place]) Ndc#8915-6808-24-    Reviewed

 

                    2012 12:00 AM    IMMUNOTHERAPY INJECTIONS    Reviewed

 

                    2012 12:00 AM    B12 Injection([place]) Ndc#8857-8566-48-    Reviewed

 

                    2012 12:00 AM    THER/PROPH/DIAG INJ SC/IM    Reviewed

 

                    2012 12:00 AM    B12 Injection, Up to 1000 Mcg NDC#7007-7706-44    Reviewed

 

                    2012 12:00 AM    THER/PROPH/DIAG INJ SC/IM    Reviewed

 

                    2012 12:00 AM    B12 Injection, Up to 1000 Mcg NDC#7993-4110-96    Reviewed

 

                    2012 12:00 AM    THER/PROPH/DIAG INJ SC/IM    Reviewed

 

                    2012 12:00 AM    B12 Injection, Up to 1000 Mcg NDC#8895-1464-83    Reviewed

 

                    10/16/2012 12:00 AM    THER/PROPH/DIAG INJ SC/IM    Reviewed

 

                    10/16/2012 12:00 AM    B12 Injection, Up to 1000 Mcg NDC#1894-0020-69    Reviewed

 

                    2010 12:00 AM    COMPREHEN METABOLIC PANEL    Reviewed

 

                    2010 12:00 AM    COMPLETE CBC W/AUTO DIFF WBC    Reviewed

 

                    2010 12:00 AM    LIPID PANEL         Reviewed

 

                    2013 12:00 AM    Flu Injection 3 Years And Above NDC# [address]-89  [facility]    Reviewed



 

                    2013 12:00 AM    COMPLETE CBC W/AUTO DIFF WBC    Reviewed

 

                    2013 12:00 AM    ASSAY OF LITHIUM    Reviewed

 

                    2013 12:00 AM    METABOLIC PANEL TOTAL CA    Reviewed

 

                    4/3/2013 12:00 AM    THER/PROPH/DIAG INJ SC/IM    Reviewed

 

                    4/3/2013 12:00 AM    B12 Injection, Up to 1000 Mcg NDC#1293-2281-41    Reviewed

 

                    2013 12:00 AM    THER/PROPH/DIAG INJ SC/IM    Reviewed

 

                    2013 12:00 AM    B12 Injection, Up to 1000 Mcg NDC#1717-1982-31    Reviewed

 

                    2013 12:00 AM    THER/PROPH/DIAG INJ SC/IM    Reviewed

 

                    2013 12:00 AM    B12 Injection, Up to 1000 Mcg NDC#9591-1832-18    Reviewed

 

                    2013 12:00 AM    LIPID PANEL         Reviewed

 

                    2013 12:00 AM    VITAMIN D 25 HYDROXY    Reviewed

 

                    2013 12:00 AM    THER/PROPH/DIAG INJ SC/IM    Reviewed

 

                    3/6/2014 12:00 AM    THER/PROPH/DIAG INJ SC/IM    Reviewed

 

                    2014 12:00 AM    THER/PROPH/DIAG INJ SC/IM    Reviewed

 

                    2014 12:00 AM    B12 Injection, Up to 1000 Mcg NDC#7557-6344-75    Reviewed

 

                    2010 12:00 AM    SKIN FUNGI CULTURE    Reviewed

 

                    10/9/2010 12:00 AM    COMPREHEN METABOLIC PANEL    Reviewed

 

                    10/9/2010 12:00 AM    LIPID PANEL         Reviewed

 

                    2010 12:00 AM    THER/PROPH/DIAG INJ SC/IM    Reviewed

 

                    2010 12:00 AM    B12 Injection Ndc#[address]-82 ([place])    Reviewed

 

                    2010 12:00 AM    THER/PROPH/DIAG INJ SC/IM    Reviewed

 

                    2010 12:00 AM    Kenalog 40 Mg Im-Ndc#[address]-21 ([place])    Reviewed

 

                    10/15/2010 12:00 AM    FLU VACCINE 3 YRS & > IM    Reviewed

 

                    10/15/2010 12:00 AM    Admin.Of M/C Cov.Vaccine-Flu Vacc.    Reviewed

 

                    1/15/2011 12:00 AM    COMPLETE CBC W/AUTO DIFF WBC    Reviewed

 

                    1/15/2011 12:00 AM    COMPREHEN METABOLIC PANEL    Reviewed

 

                    1/15/2011 12:00 AM    LIPID PANEL         Reviewed

 

                    2014 12:00 AM    MAMMOGRAM SCREENING    Reviewed

 

                    2014 12:00 AM    Screening mammography, bilateral    Reviewed

 

                    7/10/2014 12:00 AM    THER/PROPH/DIAG INJ SC/IM    Reviewed

 

                    7/10/2014 12:00 AM    B12 Injection, Up to 1000 Mcg NDC#9242-7456-16    Reviewed

 

                    2011 12:00 AM    COMPLETE CBC W/AUTO DIFF WBC    Reviewed

 

                    2011 12:00 AM    COMPREHEN METABOLIC PANEL    Reviewed

 

                    2011 12:00 AM    LIPID PANEL         Reviewed

 

                    2014 12:00 AM    B12 Injection, Up to 1000 Mcg NDC#3003-9952-04    Reviewed

 

                    10/19/2014 12:00 AM    MAMMOGRAM SCREENING    Reviewed

 

                    10/19/2014 12:00 AM    Screening mammography, bilateral    Reviewed

 

                    10/16/2014 12:00 AM    COMPLETE CBC W/AUTO DIFF WBC    Reviewed

 

                    10/16/2014 12:00 AM    COMPREHEN METABOLIC PANEL    Reviewed

 

                    10/16/2014 12:00 AM    IMMUNOASSAY TUMOR     Reviewed

 

                    10/16/2014 12:00 AM    LIPID PANEL         Reviewed

 

                    10/16/2014 12:00 AM    ASSAY OF LITHIUM    Reviewed

 

                    10/16/2014 12:00 AM    MAMMOGRAM SCREENING    Reviewed

 

                    2011 12:00 AM    ASSAY OF PARATHORMONE    Reviewed

 

                    2011 12:00 AM    VITAMIN D 25 HYDROXY    Reviewed

 

                    2011 12:00 AM    ASSAY OF LITHIUM    Reviewed

 

                    2011 12:00 AM    METABOLIC PANEL TOTAL CA    Reviewed

 

                    2011 12:00 AM    CT HEAD/BRAIN W/O & W/DYE    Reviewed

 

                    3/23/2015 12:00 AM    PNEUMOCOCCAL VACC 13 GLENDY IM    Reviewed

 

                    3/23/2015 12:00 AM    Vitamin B12 injection    Reviewed

 

                    2011 12:00 AM    ASSAY OF LITHIUM    Reviewed

 

                    2011 12:00 AM    B12 Injection Ndc#711[address]-30  [facility]    Reviewed

 

                    2015 12:00 AM    THER/PROPH/DIAG INJ SC/IM    Reviewed

 

                    2015 12:00 AM    B12 Injection, Up to 1000 Mcg NDC#4634-4378-83    Reviewed

 

                    2015 12:00 AM    COMPLETE CBC W/AUTO DIFF WBC    Reviewed

 

                    2015 12:00 AM    COMPREHEN METABOLIC PANEL    Reviewed

 

                    2015 12:00 AM    LIPID PANEL         Reviewed

 

                    2015 12:00 AM    ASSAY OF LITHIUM    Reviewed

 

                    2011 12:00 AM    VIT D 1 25-DIHYDROXY    Reviewed

 

                    2011 12:00 AM    VITAMIN B-12        Reviewed

 

                    2015 12:00 AM    B12 Injection, Up to 1000 Mcg NDC#6975-2764-51    Reviewed

 

                    2015 12:00 AM    THER/PROPH/DIAG INJ SC/IM    Reviewed

 

                    2015 12:00 AM    B12 Injection, Up to 1000 Mcg NDC#7784-7264-61    Reviewed

 

                    2011 12:00 AM    THER/PROPH/DIAG INJ SC/IM    Reviewed

 

                    2011 12:00 AM    B12 Injection ([facility]) Ndc#6902-5550-50    Reviewed

 

                    2015 12:00 AM    THER/PROPH/DIAG INJ SC/IM    Reviewed

 

                    2015 12:00 AM    B12 Injection, Up to 1000 Mcg NDC#7812-1331-11    Reviewed







Results Summary







                          Data and Description      Results

 

                          2004 12:00 AM        Colonoscopy-Women and Men over 50 Normal 

 

                          2008 12:00 AM         Pap Smear Declined 

 

                          10/7/2009 12:00 AM        Cholest Cry Stone Ql .0 %LDLc SerPl-mCnc 123.0 mg/dLHDLc

 SerPl-mCnc 34.0 mg/dLTrigl SerPl-mCnc 190.0 mg/dLGlucose SerPl-mCnc 78.0 mg/dL

 

                          2009 12:00 AM        Mammogram -Women over 40 Normal HIV1+2 Ab Ser Ql no risk 

 

                          2010 8:47 AM         Dexa Bone Scan Refused Aspirin reccommended Contraindication 



 

                          2010 8:48 AM         Depression Done 

 

                          2010 12:00 AM         Foot Exam-Diabetic Done 

 

                          2010 12:00 AM         Cholest Cry Stone Ql .0 %LDLc SerPl-mCnc 126.0 mg/dLGlucose

 SerPl-mCnc 102.0 mg/dL

 

                          2010 8:45 AM          TRIGLYCERIDES 122.0 mg/dLCHOLESTEROL 186.0 mg/dLHDL 36.0 mg/dLTOT

 CHOL/HDL 5.2 LDL (CALC) 126.0 mg/dLGLUCOSE 102.0 mg/dLSODIUM 143.0 
mmol/LPOTASSIUM 3.70 mmol/LCHLORIDE 111.0 mmol/LCO2 23.0 mmol/LBUN 10.0 
mg/dLCREATININE 0.80 mg/dLSGOT/AST 12.0 IU/LSGPT/ALT 11.0 IU/LALK PHOS 65.0 
IU/LTOTAL PROTEIN 7.20 g/dLALBUMIN 3.90 g/dLTOTAL BILI 0.50 mg/dLCALCIUM 10.20 
mg/dLAGE 59 GFR NonAA 73 GFR AA 88 eGFR >60 mL/min/1.73 m2eGFR AA* >60 WBC 5.7 
RBC 3.26 HGB 10.60 g/dLHCT 31.70 %MCV 97.0 fLMCH 32.50 pgMCHC 33.40 g/dLRDW SD 
47 RDW CV 13.30 %MPV 9.70 fLPLT 287 NRBC# 0.00 NRBC% 0.0 %NEUT 62.90 %%LYMP 
21.80 %%MONO 9.90 %%EOS 5.0 %%BASO 0.40 %#NEUT 3.56 #LYMP 1.23 #MONO 0.56 #EOS 
0.28 #BASO 0.02 MANUAL DIFF NOT IND 

 

                          2010 12:00 AM        Glucose SerPl-mCnc 96.0 mg/dLCholest Cry Stone Ql .0 %LDLc

 SerPl-mCnc 146.0 mg/dL

 

                          2010 8:26 AM         TRIGLYCERIDES 106.0 mg/dLCHOLESTEROL 199.0 mg/dLHDL 32.0 mg/dLTOT

 CHOL/HDL 6.2 LDL (CALC) 146.0 mg/dLGLUCOSE 96.0 mg/dLSODIUM 143.0 
mmol/LPOTASSIUM 4.0 mmol/LCHLORIDE 113.0 mmol/LCO2 24.0 mmol/LBUN 13.0 
mg/dLCREATININE 1.0 mg/dLSGOT/AST 11.0 IU/LSGPT/ALT 6.0 IU/LALK PHOS 56.0 
IU/LTOTAL PROTEIN 6.60 g/dLALBUMIN 3.80 g/dLTOTAL BILI 0.50 mg/dLCALCIUM 9.30 
mg/dLAGE 59 GFR NonAA 57 GFR AA 69 eGFR 57 eGFR AA* >60 

 

                          10/6/2010 12:00 AM        Cholest Cry Stone Ql .0 %LDLc SerPl-mCnc 111.0 mg/dLGlucose

 SerPl-mCnc 81.0 mg/dL

 

                          10/6/2010 2:45 PM         TRIGLYCERIDES 123.0 mg/dLCHOLESTEROL 178.0 mg/dLHDL 42.0 mg/dLTOT

 CHOL/HDL 4.2 LDL (CALC) 111.0 mg/dLGLUCOSE 81.0 mg/dLSODIUM 139.0 
mmol/LPOTASSIUM 4.10 mmol/LCHLORIDE 106.0 mmol/LCO2 24.0 mmol/LBUN 13.0 
mg/dLCREATININE 0.90 mg/dLSGOT/AST 13.0 IU/LSGPT/ALT 11.0 IU/LALK PHOS 61.0 
IU/LTOTAL PROTEIN 7.10 g/dLALBUMIN 3.90 g/dLTOTAL BILI 0.30 mg/dLCALCIUM 9.30 
mg/dLAGE 60 GFR NonAA 64 GFR AA 78 eGFR >60 mL/min/1.73 m2eGFR AA* >60 WBC 6.9 
RBC 3.59 HGB 11.50 g/dLHCT 35.30 %MCV 98.0 fLMCH 32.0 pgMCHC 32.60 g/dLRDW SD 46
 RDW CV 12.90 %MPV 9.90 fLPLT 311 NRBC# 0.00 NRBC% 0.0 %NEUT 64.90 %%LYMP 22.50 
%%MONO 7.20 %%EOS 5.10 %%BASO 0.30 %#NEUT 4.45 #LYMP 1.54 #MONO 0.49 #EOS 0.35 
#BASO 0.02 MANUAL DIFF NOT IND 

 

                          2011 12:00 AM         Mammogram -Women over 40 Ordered 

 

                          2011 10:25 AM        TRIGLYCERIDES 111.0 mg/dLCHOLESTEROL 195.0 mg/dLHDL 43.0 mg/dLTOT

 CHOL/HDL 4.5 LDL (CALC) 130.0 mg/dLWBC 5.3 RBC 3.76 HGB 12.0 g/dLHCT 37.80 %MCV
 101.0 fLMCH 31.90 pgMCHC 31.70 g/dLRDW SD 47 RDW CV 13.0 %MPV 9.70 fLPLT 259 
NRBC# 0.00 NRBC% 0.0 %NEUT 69.0 %%LYMP 17.60 %%MONO 8.30 %%EOS 4.70 %%BASO 0.40 
%#NEUT 3.63 #LYMP 0.93 #MONO 0.44 #EOS 0.25 #BASO 0.02 MANUAL DIFF NOT IND 
GLUCOSE 102.0 mg/dLSODIUM 146.0 mmol/LPOTASSIUM 4.20 mmol/LCHLORIDE 113.0 
mmol/LCO2 23.0 mmol/LBUN 15.0 mg/dLCREATININE 1.0 mg/dLSGOT/AST 12.0 
IU/LSGPT/ALT 17.0 IU/LALK PHOS 60.0 IU/LTOTAL PROTEIN 6.90 g/dLALBUMIN 4.20 
g/dLTOTAL BILI 0.40 mg/dLCALCIUM 9.70 mg/dLAGE 60 GFR NonAA 57 GFR AA 69 eGFR 57
 eGFR AA* >60 

 

                          2011 11:49 AM        Cholest Cry Stone Ql .0 %LDLc SerPl-mCnc 130.0 mg/dLHDLc

 SerPl-mCnc 43.0 mg/dLTrigl SerPl-mCnc 111.0 mg/dLGlucose SerPl-mCnc 102.0 mg/dL

 

                          2011 11:52 AM        Pap Smear Declined 

 

                          2011 11:28 AM        Lithium 2.080 mmol/LGLUCOSE 102.0 mg/dLSODIUM 135.0 mmol/LPOTASSIUM

 3.90 mmol/LCHLORIDE 106.0 mmol/LCO2 21.0 mmol/LBUN 12.0 mg/dLCREATININE 1.30 
mg/dLCALCIUM 10.70 mg/dLAGE 60 GFR NonAA 42 GFR AA 51 eGFR 42 eGFR AA* 51 

 

                          2011 8:58 AM          Lithium 0.690 mmol/L

 

                          2011 2:38 PM         VITAMIN B12 3483.0 pg/mL

 

                          2013 3:35 PM          WBC 5.1 RBC 3.73 HGB 11.70 g/dLHCT 36.40 %MCV 98.0 fLMCH 31.40

 pgMCHC 32.10 g/dLRDW SD 47 RDW CV 13.10 %MPV 9.80 fLPLT 224 NRBC# 0.00 NRBC% 
0.0 %NEUT 66.80 %%LYMP 19.10 %%MONO 9.0 %%EOS 4.90 %%BASO 0.20 %#NEUT 3.42 #LYMP
 0.98 #MONO 0.46 #EOS 0.25 #BASO 0.01 MANUAL DIFF NOT IND GLUCOSE 88.0 
mg/dLSODIUM 141.0 mmol/LPOTASSIUM 4.10 mmol/LCHLORIDE 110.0 mmol/LCO2 22.0 
mmol/LBUN 22.0 mg/dLCREATININE 1.10 mg/dLCALCIUM 9.80 mg/dLAGE 62 GFR NonAA 50 
GFR AA 61 eGFR 50 eGFR AA* 60 Lithium 0.760 mmol/L

 

                          2013 11:02 AM        TRIGLYCERIDES 106.0 mg/dLCHOLESTEROL 181.0 mg/dLHDL 46.0 mg/dLTOT

 CHOL/HDL 3.9 LDL (CALC) 114.0 mg/dLVITAMIN D 41.10 ng/mL

 

                          10/17/2014 10:10 AM       WBC 5.0 RBC 3.66 HGB 11.60 g/dLHCT 36.80 %.0 fLMCH 31.70

 pgMCHC 31.50 g/dLRDW SD 50 RDW CV 13.50 %MPV 10.10 fLPLT 209 NRBC# 0.00 NRBC% 
0.0 %NEUT 69.20 %%LYMP 21.0 %%MONO 6.40 %%EOS 3.20 %%BASO 0.20 %#NEUT 3.46 #LYMP
 1.05 #MONO 0.32 #EOS 0.16 #BASO 0.01 MANUAL DIFF NOT IND GLUCOSE 100.0 
mg/dLSODIUM 148.0 mmol/LPOTASSIUM 3.90 mmol/LCHLORIDE 114.0 mmol/LCO2 26.0 
mmol/LBUN 12.0 mg/dLCREATININE 1.20 mg/dLSGOT/AST 9.0 IU/LSGPT/ALT <6 IU/LALK 
PHOS 82.0 IU/LTOTAL PROTEIN 6.90 g/dLALBUMIN 4.0 g/dLTOTAL BILI 0.40 
mg/dLCALCIUM 10.50 mg/dLAGE 64 GFR NonAA 45 GFR AA 55 eGFR 45 eGFR AA* 55 
TRIGLYCERIDES 96.0 mg/dLCHOLESTEROL 155.0 mg/dLHDL 38.0 mg/dLTOT CHOL/HDL 4.1 
LDL (CALC) 98.0 mg/dLLithium 0.850 mmol/LCancer Antigen (CA) 125 8.30 U/mL

 

                          2015 10:25 AM        Lithium 0.790 mmol/LWBC 4.8 RBC 3.44 HGB 11.0 g/dLHCT 35.20 

%.0 fLMCH 32.0 pgMCHC 31.30 g/dLRDW SD 53 RDW CV 14.0 %MPV 9.30 fLPLT 210
 NRBC# 0.00 NRBC% 0.0 %NEUT 70.80 %%LYMP 17.20 %%MONO 8.10 %%EOS 3.50 %%BASO 
0.40 %#NEUT 3.41 #LYMP 0.83 #MONO 0.39 #EOS 0.17 #BASO 0.02 MANUAL DIFF NOT IND 
TRIGLYCERIDES 107.0 mg/dLCHOLESTEROL 174.0 mg/dLHDL 43.0 mg/dLTOT CHOL/HDL 4.0 
LDL (CALC) 110.0 mg/dLGLUCOSE 90.0 mg/dLSODIUM 145.0 mmol/LPOTASSIUM 3.80 
mmol/LCHLORIDE 115.0 mmol/LCO2 24.0 mmol/LBUN 17.0 mg/dLCREATININE 1.30 
mg/dLSGOT/AST 18.0 IU/LSGPT/ALT 17.0 IU/LALK PHOS 56.0 IU/LTOTAL PROTEIN 6.70 
g/dLALBUMIN 3.90 g/dLTOTAL BILI 0.40 mg/dLCALCIUM 9.80 mg/dLAGE 64 GFR NonAA 41 
GFR AA 50 eGFR 41 eGFR AA* 50 

 

                          2015 8:50 AM        WBC 5.8 RBC 3.29 HGB 10.70 g/dLHCT 34.0 %.0 fLMCH 32.50

 pgMCHC 31.50 g/dLRDW SD 52 RDW CV 13.60 %MPV 9.60 fLPLT 223 NRBC# 0.00 NRBC% 
0.0 %NEUT 69.60 %%LYMP 18.90 %%MONO 8.50 %%EOS 2.80 %%BASO 0.20 %#NEUT 4.03 
#LYMP 1.09 #MONO 0.49 #EOS 0.16 #BASO 0.01 MANUAL DIFF NOT IND Lithium 0.620 
mmol/LGLUCOSE 83.0 mg/dLSODIUM 139.0 mmol/LPOTASSIUM 3.90 mmol/LCHLORIDE 109.0 
mmol/LCO2 22.0 mmol/LBUN 19.0 mg/dLCREATININE 1.40 mg/dLSGOT/AST 19.0 
IU/LSGPT/ALT 21.0 IU/LALK PHOS 55.0 IU/LTOTAL PROTEIN 6.50 g/dLALBUMIN 3.90 
g/dLTOTAL BILI 0.50 mg/dLCALCIUM 9.60 mg/dLAGE 65 GFR NonAA 38 GFR AA 46 eGFR 38
 eGFR AA* 46 TRIGLYCERIDES 121.0 mg/dLCHOLESTEROL 192.0 mg/dLHDL 51.0 mg/dLTOT 
CHOL/HDL 3.8 .0 mg/dLFREE T4 0.79 TSH 1.210 uIU/mLHemoglobin A1c 5.40 
%Estim. Avg Glu (eAG) 108 

 

                          3/15/2016 8:08 AM         VITAMIN B12 696.0 pg/mL

 

                          3/23/2016 8:26 AM         WBC 7.0 RBC 3.61 HGB 11.80 g/dLHCT 37.70 %.0 fLMCH 32.70

 pgMCHC 31.30 g/dLRDW SD 49 RDW CV 12.50 %MPV 10.0 fLPLT 207 NRBC# 0.00 NRBC% 
0.0 %NEUT 73.60 %%LYMP 16.40 %%MONO 6.60 %%EOS 3.0 %%BASO 0.30 %#NEUT 5.15 #LYMP
 1.15 #MONO 0.46 #EOS 0.21 #BASO 0.02 MANUAL DIFF NOT IND Lithium 0.940 
mmol/LGLUCOSE 108.0 mg/dLSODIUM 143.0 mmol/LPOTASSIUM 4.30 mmol/LCHLORIDE 110.0 
mmol/LCO2 27.0 mmol/LBUN 16.0 mg/dLCREATININE 1.60 mg/dLSGOT/AST 13.0 
IU/LSGPT/ALT 7.0 IU/LALK PHOS 71.0 IU/LTOTAL PROTEIN 6.80 g/dLALBUMIN 4.0 
g/dLTOTAL BILI 0.20 mg/dLCALCIUM 10.40 mg/dLAGE 65 GFR NonAA 32 GFR AA 39 eGFR 
32 eGFR AA* 39 TRIGLYCERIDES 113.0 mg/dLCHOLESTEROL 169.0 mg/dLHDL 42.0 mg/dLTOT
 CHOL/HDL 4.0 LDL (CALC) 104.0 mg/dLFREE T4 0.86 TSH 2.20 uIU/mLHemoglobin A1c 
5.20 %Estim. Avg Glu (eAG) 103 

 

                          3/25/2016 9:17 AM         COLOR YELLOW APPEARANCE CLEAR SPEC GRAV 1.010 pH 7.0 PROTEIN 

NEGATIVE GLUCOSE NEGATIVE mg/dLKETONE NEGATIVE BILIRUBIN NEGATIVE BLOOD NEGATIVE
 NITRITE NEGATIVE [place] SCREEN SMALL MICRO IND? SEE BELOW WBC/HPF 0-5 RBC/HPF 
NEGATIVE CASTS/LPF NEGATIVE /LPFCRYSTALS NEGATIVE MUCOUS THRDS NEGATIVE BACTERIA
 NEGATIVE EPITH CELLS FEW SQUAMOUS /HPFTRICHOMONAS NEGATIVE YEAST NEGATIVE 

 

                          2016 6:00 AM        GLUCOSE 91.0 mg/dLSODIUM 143.0 mmol/LPOTASSIUM 3.60 mmol/LCHLORIDE

 112.0 mmol/LCO2 23.0 mmol/LBUN 22.0 mg/dLCREATININE 1.20 mg/dLSGOT/AST 15.0 
IU/LSGPT/ALT 12.0 IU/LALK PHOS 61.0 IU/LTOTAL PROTEIN 5.40 g/dLALBUMIN 3.10 
g/dLTOTAL BILI 0.40 mg/dLCALCIUM 8.40 mg/dLAGE 66 GFR NonAA 45 GFR AA 55 eGFR 45
 eGFR AA* 55 WBC 3.0 RBC 3.05 HGB 9.80 g/dLHCT 32.10 %.0 fLMCH 32.10 
pgMCHC 30.50 g/dLRDW SD 54 RDW CV 14.20 %MPV 10.10 fLPLT 170 NRBC# 0.00 NRBC% 
0.0 %NEUT 50.70 %%LYMP 32.60 %%MONO 10.50 %%EOS 5.90 %%BASO 0.30 %#NEUT 1.54 
#LYMP 0.99 #MONO 0.32 #EOS 0.18 #BASO 0.01 MANUAL DIFF NOT IND 

 

                          2016 2:09 PM        COLOR YELLOW APPEARANCE CLEAR SPEC GRAV 1.010 pH 5.0 PROTEIN

 30 GLUCOSE NEGATIVE mg/dLKETONE NEGATIVE BILIRUBIN NEGATIVE BLOOD LARGE NITRITE
 NEGATIVE [place] SCREEN MODERATE MICRO INDICATED? SEE BELOW WBC/HPF  RBC/HPF
 20-50 CASTS/LPF NEGATIVE /LPFCRYSTALS NEGATIVE MUCOUS THRDS NEGATIVE BACTERIA 
NEGATIVE EPITH CELLS FEW SQUAMOUS /HPFTRICHOMONAS NEGATIVE YEAST NEGATIVE CULT 
SET UP? YES 







History Of Immunizations







       Name    Date Admin    Mfg Name    Mfg Code    Trade Name    Lot#    Route    Inj    Vis Given    Vis

 Pub                                    CVX

 

        Influenza    2008    Not Entered    NE      Not Entered            Not Entered    Not Entered

                    1            999

 

        X       12/19/2008    [employer]    MSD     Pneumovax 23            Intramuscular    Not Entered

                    1            999

 

           Influenza    10/15/2010    [employer]    NOV        Fluvirin > 12 Years    

[id number]     Intramuscular    Left Deltoid    10/15/2010    2009    999

 

          X         3/23/2015    [facility]    [facility]       Prevnar 13    [id number]    Intramuscular

                Right Gluteous Medius    3/23/2015       2013       109







History of Past Illness







                    Name                Date of Onset       Comments

 

                    Peritoneal Neoplasm, Malignant                         

 

                    Hyperlipidemia                           

 

                    Bipolar disorder, unspecified                         

 

                    Artificial opening status; colostomy                         

 

                    B12 deficiency                           

 

                    Anemia, Pernicious                         

 

                    Arthritis unspecified                         

 

                    cervical cancer                          

 

                    Artificial opening status; colostomy    2010  1:10PM     

 

                    Bipolar disorder, unspecified    2010  1:10PM     

 

                    Hyperlipidemia      2010  1:10PM     

 

                    Anemia, Pernicious    2010  1:10PM     

 

                    Postoperative Follow-Up    2010  1:55PM     

 

                    Postoperative Follow-Up    Mar  8 2010 10:57AM     

 

                    Artificial opening status; colostomy    Mar  8 2010  1:19PM     

 

                    Peritoneal Neoplasm, Malignant    Mar  8 2010  1:19PM     

 

                    Artificial opening status; colostomy    2010  1:40PM     

 

                    Hyperlipidemia      2010  1:40PM     

 

                    Anemia, Pernicious    2010  1:40PM     

 

                    Peritoneal Neoplasm, Malignant    2010  1:40PM     

 

                    Arthritis unspecified    2010  1:40PM     

 

                    Anemia of Chronic Illness    2010  1:40PM     

 

                    Tinea corporis      2010  3:17PM     

 

                    Bipolar disorder, unspecified    2010  1:33PM     

 

                    Hyperlipidemia      2010  1:33PM     

 

                    Anemia, Pernicious    2010  1:33PM     

 

                    Peritoneal Neoplasm, Malignant    2010  1:33PM     

 

                    B12 deficiency      2010  1:33PM     

 

                    Ethmoidal Sinusitis, Acute    Sep 21 2010  3:53PM     

 

                    Wheezing            Sep 21 2010  3:53PM     

 

                    Flu                 Oct 15 2010  1:40PM     

 

                    Bipolar disorder, unspecified    Oct 15 2010  1:42PM     

 

                    Hyperlipidemia      Oct 15 2010  1:42PM     

 

                    Anemia, Pernicious    Oct 15 2010  1:42PM     

 

                    Peritoneal Neoplasm, Malignant    Oct 15 2010  1:42PM     

 

                    Bipolar disorder, unspecified    2011 12:01PM     

 

                    Hyperlipidemia      2011 12:01PM     

 

                    Anemia, Pernicious    2011 12:01PM     

 

                    Peritoneal Neoplasm, Malignant    2011 12:01PM     

 

                    Bipolar disorder, unspecified    Apr 15 2011 10:55AM     

 

                    Major Depression    2011 10:11AM     

 

                    Bipolar Disorder    2011 10:11AM     

 

                    Cancer              May 10 2011  4:16PM     

 

                    Major Depression    May 10 2011  3:16PM     

 

                    Bipolar Disorder    May 10 2011  3:16PM     

 

                    Hypercalcemia       May 23 2011  2:47PM     

 

                    Bipolar disorder, unspecified    May 23 2011  2:47PM     

 

                    Colon Cancer, Personal History    May 23 2011  2:47PM     

 

                    Bipolar Disorder    May 31 2011  4:39PM     

 

                    Depressive Disorder    2011 10:01AM     

 

                    Vitamin B12 deficiency    2011 10:01AM     

 

                    Vitamin D Deficiency    2011  5:07PM     

 

                    Anemia, Vitamin B12 Deficiency    2011  5:07PM     

 

                    B12 deficiency      2011  3:56PM     

 

                    Routine gynecological examination    Aug  4 2011  9:08AM     

 

                    Screening Examination for Breast Cancer    Aug  4 2011  9:08AM     

 

                    Tinea Corporis      Aug  4 2011  9:08AM     

 

                    Depressive Disorder    Sep 23 2011  8:47AM     

 

                    Contact Dermatitis    Sep 23 2011  8:47AM     

 

                    Anemia, Pernicious    Sep 23 2011  8:47AM     

 

                    B12 deficiency      Sep 23 2011  8:47AM     

 

                    B12 deficiency      Sep 27 2011  2:58PM     

 

                    B12 deficiency      Oct 20 2011  2:34PM     

 

                    Flu                 Dec  9 2011  3:16PM     

 

                    B12 deficiency      Dec  9 2011  3:17PM     

 

                    B12 deficiency      2012  4:52PM     

 

                    B12 deficiency      2012 11:10AM     

 

                    B12 deficiency      2012  3:37PM     

 

                    B12 deficiency      May  3 2012  4:10PM     

 

                    B12 deficiency      2012  2:54PM     

 

                    B12 deficiency      2012 11:23AM     

 

                    B12 deficiency      Aug  9 2012  2:08PM     

 

                    B12 deficiency      Sep  6 2012  4:36PM     

 

                    B12 deficiency      Oct 16 2012 10:23AM     

 

                    Flu                 b  2013  3:11PM     

 

                    Bipolar disorder, unspecified    2013  2:48PM     

 

                    Anemia, Pernicious    Fe2013  2:48PM     

 

                    B12 deficiency      2013  2:48PM     

 

                    Extrapyramidal abnormal movement disorder    2013  2:48PM     

 

                    B12 deficiency      Apr  3 2013 12:03PM     

 

                    Bipolar disorder, unspecified    May  7 2013  1:31PM     

 

                    Anemia, Pernicious    May  7 2013  1:31PM     

 

                    B12 deficiency      May  7 2013  1:31PM     

 

                    Extrapyramidal abnormal movement disorder    May  7 2013  1:31PM     

 

                    B12 deficiency      2013  3:42PM     

 

                    B12 deficiency      2013  1:31PM     

 

                    Hyperlipidemia      Aug  7 2013 10:37AM     

 

                    Vitamin D Deficiency    Aug  7 2013 10:37AM     

 

                    Bipolar disorder, unspecified    Aug  7 2013 10:37AM     

 

                    Anemia, Pernicious    Aug  7 2013 10:37AM     

 

                    B12 deficiency      Aug  7 2013 10:37AM     

 

                    B12 deficiency      Sep 25 2013 11:15AM     

 

                    B12 deficiency      Dec 11 2013  3:16PM     

 

                    B12 deficiency      Mar  6 2014  1:48PM     

 

                    B12 deficiency      May 21 2014  3:17PM     

 

                    Screening Examination for Breast Cancer    2014  3:23PM     

 

                    Periumbilical abdominal pain    2014  3:23PM     

 

                    B12 deficiency      Jul 10 2014  2:52PM     

 

                    Anemia, Vitamin B12 Deficiency    Aug 13 2014  4:50PM     

 

                    Bipolar disorder    Oct 16 2014 11:13AM     

 

                    Hyperlipidemia      Oct 16 2014 11:13AM     

 

                    Anemia, Pernicious    Oct 16 2014 11:13AM     

 

                    Peritoneal Neoplasm, Malignant    Oct 16 2014 11:13AM     

 

                    Screening breast examination    Oct 16 2014 11:13AM     

 

                    Weight loss         Oct 16 2014 11:13AM     

 

                    Anemia, Pernicious    Mar 23 2015  2:57PM     

 

                    B12 deficiency      Mar 23 2015  2:57PM     

 

                    Need for Prevnar vaccine    Mar 23 2015  2:57PM     

 

                    Bipolar disorder    Mar 23 2015  2:57PM     

 

                    Hyperlipidemia      Mar 23 2015  2:57PM     

 

                    Anemia, Pernicious    Mar 23 2015  2:57PM     

 

                    Peritoneal Neoplasm, Malignant    Mar 23 2015  2:57PM     

 

                    B12 deficiency      May  4 2015  4:48PM     

 

                    Hyperlipidemia      May 13 2015  9:56AM     

 

                    Anemia              May 13 2015  9:56AM     

 

                    Bipolar disorder    May 13 2015  9:56AM     

 

                    Bipolar disorder    May 14 2015  3:27PM     

 

                    Hyperlipidemia      May 14 2015  3:27PM     

 

                    Anemia, Pernicious    May 14 2015  3:27PM     

 

                    Peritoneal Neoplasm, Malignant    May 14 2015  3:27PM     

 

                    B12 deficiency      2015  2:20PM     

 

                    B12 deficiency      2015 11:34AM     

 

                    B12 deficiency      Aug 18 2015  9:06AM     

 

                    Tinea Corporis      Sep 18 2015  8:54AM     

 

                    B12 deficiency      Sep 18 2015  8:54AM     

 

                    B12 deficiency      2015 10:28AM     

 

                    Herpes zoster without complication    Dec  3 2015  9:52AM     

 

                    B12 deficiency      Dec 23 2015 11:21AM     

 

                    B12 deficiency      2016  4:51PM     

 

                    Vitamin B 12 deficiency    Mar 14 2016  5:35PM     

 

                    B12 deficiency      Mar 15 2016 12:14PM     

 

                    B12 deficiency      May  5 2016 11:30AM     

 

                    Edema               May  5 2016 11:30AM     

 

                    Dermatitis          May  5 2016 11:30AM     

 

                    Edema               May 17 2016  8:38AM     

 

                    Shortness of breath    May 17 2016  8:38AM     

 

                    Bilateral edema of lower extremity    2016  2:06PM     

 

                    B12 deficiency      2016  2:06PM     

 

                    B12 deficiency      2016 11:50AM     

 

                    B12 deficiency      2016 11:20AM     

 

                    Diarrhea            Aug  2 2016  3:13PM     

 

                    B12 deficiency      Aug 24 2016 11:10AM     

 

                    Encounter for screening mammogram for breast cancer    Aug 24 2016 11:44AM     

 

                    B12 deficiency      Sep 28 2016  2:35PM     

 

                    B12 deficiency      Dec 15 2016  2:02PM     

 

                    Dysuria             Dec 29 2016 12:14PM     

 

                    Hematuria           Fidencio  3 2017  1:33PM     







Payers







           Insurance Name    Company Name    Plan Name    Plan Number    Policy Number    Policy Group

 Number                                 Start Date

 

                    Medicare Part A    Medicare RHC              [id number]              N/A

 

                          [employer]    [employer]                 [id number]

                                                    

 

                    Medicare Part A    Medicare - Lab/Xray              [id number]              2006

 

                    Medicare Part B    Medicare [place]              [id number]              2006

 

                          [facility]    [facility] [employer]                 50 percent

                                                    2009

 

                    [facility]    [employer]              [id number]              N/A

 

                    Medicare Part A    Medicare Part A              [id number]              N/A

 

                    Medicare Part A    Medicare Part A              [id number]              2006









History of Encounters







                    Visit Date          Visit Type          Provider

 

                    12/15/2016          Nurse visit         Bhupinder Louise DO

 

                    2016           Nurse visit         Bret GREEN

 

                    2016           Nurse visit         Bhupinder Louise DO

 

                    2016            Office visit        Bhupinder Aspen DO

 

                    2016           Nurse visit         Bhupinder Aspen DO

 

                    2016           Office visit        Bret Forte APRN

 

                    2016            Office visit        Bhupinder Aspen DO

 

                    3/15/2016           Nurse visit         Bhupinder Aspen DO

 

                    2016            Nurse visit         Bhupinder Aspen DO

 

                    2015          Nurse visit         Bhupinder Aspen DO

 

                    12/3/2015           Office visit        Bhupinder Aspen DO

 

                    2015          Nurse visit         Bhupinder Aspen DO

 

                    2015           Office visit        Bhupinder Aspen DO

 

                    2015           Nurse visit         Bhupinder Aspen DO

 

                    2015            Nurse visit         Bhupinder Aspen DO

 

                    2015            Nurse visit         Bhupinder Aspen DO

 

                    2015           Office visit        Bhupinder Aspen DO

 

                    2015            Nurse visit         Bhupinder Aspen DO

 

                    3/23/2015           Office visit        Bhupinder Aspen DO

 

                    10/16/2014          Office visit        Bhupinder Aspen DO

 

                    2014           Nurse visit         Radha Ontiveros APRN

 

                    7/10/2014           Nurse visit         Bhupinder Aspen DO

 

                    2014           Office visit        Bhupinder Aspen DO

 

                    2014           Nurse visit         Bhupinder Aspen DO

 

                    3/6/2014            Nurse visit         Bhupinder Aspen DO

 

                    2014            Yasmine Lopez MD

 

                    2013          Nurse visit         Bhupinder Aspen DO

 

                    2013           Nurse visit         Bhupinder Aspen DO

 

                    2013            Office visit        Bhupinder Aspen DO

 

                    2013            Nurse visit         Bhupinder Aspen DO

 

                    2013            Nurse visit         Bhupinder Aspen DO

 

                    2013            Office visit        Bhupinder Aspen DO

 

                    4/3/2013            Nurse visit         Bhupinder Aspen DO

 

                    2013            Office visit        Bhupinder Aspen DO

 

                    10/16/2012          Nurse visit         Bhupinder Aspen DO

 

                    2012            Nurse visit         Bhupinder Aspen DO

 

                    2012            Voided              Bhupinder Aspen DO

 

                    2012            Nurse visit         Bhupinder Aspen DO

 

                    2012            Nurse visit         Bhupinder Aspen DO

 

                    2012           Nurse visit         Bhupinder Aspen DO

 

                    5/3/2012            Nurse visit         Bhupinder Aspen DO

 

                    2012           Nurse visit         Bhupinder Aspen DO

 

                    2012           Nurse visit         Bhupinder Aspen DO

 

                    2012           Nurse visit         Bhupinder Aspen DO

 

                    2011           Nurse visit         Bhupinder Aspen DO

 

                    10/20/2011          Nurse visit         Bhupinder Aspen DO

 

                    2011           Office visit        Bhupinder Aspen DO

 

                    2011           Nurse visit         Radha Weston GREEN

 

                    2011            Office visit        Bhupinder Aspen DO

 

                    2011           Nurse visit         Bhupinder Aspen DO

 

                    2011            Office visit        Bhupinder Aspen DO

 

                    2011           Office visit        Bhupinder Nealte DO

 

                    5/10/2011           Office visit        Bhupinder Nealte DO

 

                    2011           Office visit        Bhupinder Louise DO

 

                    4/15/2011           Office visit        Devin Angel DO

 

                    2011           Office visit        Devin Angel DO

 

                    10/15/2010          Office visit        Devin Angel DO

 

                    2010           Office visit        Devin Angel DO

 

                    2010            Office visit        Devin Angel DO

 

                    2010           Office visit        Devin Angel DO

 

                    2010            Office visit        Devin Angel DO

 

                    3/8/2010            Office visit        Devin Masterson MD

 

                    2010            Surgery             Devin Masterson MD

 

                    2010            Office visit        Devin Angel DO

 

                    2010           Surgery             Devin Masterson MD

 

                    2010           Hospital            Devin Masterson MD

 

                    2010           [facility]            Devin Masterson MD

 

                    10/22/2009          Office visit        Devin Angel DO

## 2019-06-26 NOTE — XMS REPORT
MU2 Ambulatory Summary

                             Created on: 2016



Pauline Gan

External Reference #: [id number]

: 1950

Sex: Female



Demographics







                          Address                   [address]

[place]  [address]

 

                          Home Phone                [phone]

 

                          Preferred Language        English

 

                          Marital Status            Legally 

 

                          Jehovah's witness Affiliation     Unknown

 

                          Race                      White

 

                          Ethnic Group              Not  or 





Author







                          Bhupinder Aguilar

 

                          [facility]

 

                          Address                   [address]

[place]  [id number]



 

                          Phone                     [phone]







Care Team Providers







                    Care Team Member Name    Role                Phone

 

                    Bhupinder Louise    PCP                 Unavailable

 

                    Bhupinder Louise    PreferredProvider    Unavailable







Allergies and Adverse Reactions







                    Name                Reaction            Notes

 

                    NO KNOWN DRUG ALLERGIES                         







Plan of Treatment







             Planned Activity    Comments     Planned Date    Planned Time    Plan/Goal

 

             Injection, Subcutaneous/IM                 2016    12:00 AM      

 

             Injection, Subcutaneous/IM                 2016    12:00 AM      

 

             Mammography; bilateral                 2016    12:00 AM      

 

             Injection, Subcutaneous/IM                 2016    12:00 AM      

 

             URINALYSIS ROUTINE C&S IF IND                 2016    12:00 AM      

 

             CBC with Auto                 2013     12:00 AM      

 

             Lithium                   2013     12:00 AM      

 

             Basic metabolic panel                 2013     12:00 AM      

 

             Vitamin B12 (cyanocobalamin)                 2013     12:00 AM      

 

             Folic acid, serum                 2013     12:00 AM      

 

             Injection,Subcutaneous/Intramuscul                 2013    12:00 AM      







Medications







                                        Active 

 

             Name         Start Date    Estimated Completion Date    SIG          Comments

 

                Latuda 20 mg oral tablet                                    take 1 tablet (20 mg) by oral route once daily with

 food (at least 350 calories)            

 

             pravastatin 40 mg oral tablet    3/30/2015                 TAKE 1 TABLET BY MOUTH DAILY     

 

                Namenda XR 28 mg oral capsule,sprinkle,ER 24hr    2015                       take 1 capsule (28

 mg) by oral route once daily            

 

                Namenda XR 28 mg oral capsule,sprinkle,ER 24hr    2016                       take 1 capsule (28

 mg) by oral route once daily            

 

                triamcinolone acetonide 0.1 % topical cream    2016                        apply a thin layer to 

the affected area(s) by topical route 2 times per day     

 

                potassium chloride 10 mEq oral tablet extended release    2016                       take 1 tablet

 (10 meq) by oral route once daily       

 

             pravastatin 40 mg oral tablet    2016                 TAKE 1 TABLET BY MOUTH DAILY     

 

                Vitamin B-12 1,000 mcg/mL injection solution    2016                       inject 1 milliliter 

(1,000 mcg) by intramuscular route once a month     

 

                potassium chloride 10 mEq oral tablet extended release    2016                      take 1 tablet

 (10 meq) by oral route once daily       

 

                Namenda XR 28 mg oral capsule,sprinkle,ER 24hr    2016                      TAKE 1 CAPSULE BY

 MOUTH EVERY DAY                         

 

                furosemide 40 mg oral tablet    2016                      take 1 tablet (40 mg) by oral route

 once daily                              









                                         

 

             Name         Start Date    Expiration Date    SIG          Comments

 

             Reglan 10mg    3/29/2010    2010    one ac and hs     

 

                Keflex 500 mg oral capsule    2010       10/1/2010       take 1 capsule (500 mg) by oral

 route every 6 hours for 10 days         

 

                Bactrim -160 mg oral tablet    2011       take 1 tablet by oral route

 every 12 hours for 7 days               

 

                triamcinolone acetonide 0.1 % topical cream    2011      apply a thin

 layer to the affected area(s) by topical route 2 times per day     

 

                sertraline 100 mg oral tablet    4/10/2012       5/10/2012       take 1.5 tablets by oral route

 daily for 30 days                       

 

                ergocalciferol (vitamin D2) 50,000 unit oral capsule    4/15/2013       2013       TAKE

 ONE CAPSULE BY MOUTH ONCE A WEEK        

 

                CYANOCOBALAM 1000MCGINJ 1000 milliliter    2013       INJECT 1ML INTRAMUSCULAR

 ONCE A MONTH                            

 

                pravastatin 40 mg oral tablet    3/25/2014       3/20/2015       TAKE ONE TABLET BY MOUTH EVERY

 DAY                                     

 

                          Zostavax (PF) 19,400 unit/0.65 mL subcutaneous suspension for reconstitution    3/23/2015

                    3/24/2015           inject 0.65 milliliter by subcutaneous route once     

 

                famciclovir 500 mg oral tablet    12/3/2015       12/10/2015      take 1 tablet (500 mg) by

 oral route every 8 hours for 7 days     

 

                furosemide 40 mg oral tablet    2016      take 1 tablet (40 mg) by oral

 route once daily                        

 

                Cipro 500 mg oral tablet    2016       take 1 tablet (500 mg) by oral route

 2 times per day for 5 days              









                                        Discontinued 

 

             Name         Start Date    Discontinued Date    SIG          Comments

 

                Tylenol 325 mg oral tablet                    2013        take 1 - 2 tablets (325 -650 mg) by oral

 route every 4-6 hours as needed         

 

                Calcium 600 + D(3) 600 mg(1,500mg) -400 unit oral tablet                    2011       take 1 tablet

 by oral route 2 times a day            no longer taking

 

                Vitamin B-12 1,000 mcg oral tablet extended release    2010       take 1

 tablet by oral route daily             no longer taking

 

                Antifungal (clotrimazole) 1 % topical cream    2010       apply to the 

affected and surrounding areas of skin by topical route 2 times per day morning 
and evening                              

 

                sertraline 100 mg oral tablet    5/10/2011       2011       take 2 tablets (200 mg) by 

oral route once daily                   discontinued by Dr. Serrano

 

                mirtazapine 15 mg oral tablet                    2011        take 1 tablet (15 mg) by oral route 

once daily before bedtime               Dr. Serrano

 

                mirtazapine 15 mg oral tablet                    2011        take 1 tablet (15 mg) by oral route 

once daily before bedtime               dc'd by Dr. Serrano

 

                Pristiq 50 mg oral tablet extended release 24 hr                    2013        take 1 tablet (50

 mg) by oral route once daily           Dr. Serrano

 

                Pristiq 50 mg oral tablet extended release 24 hr                    2013        take 1 tablet (50

 mg) by oral route once daily           dose updated

 

                Vitamin B-12 1,000 mcg/mL injection solution    2011        inject 1 milliliter

 (1,000 mcg) by intramuscular route once a month    on list already

 

                    syringe with needle 1 mL 25 gauge x 1" miscellaneous syringe    2011

                          use for injection once a month     

 

                clotrimazole 1 % topical cream    2011        apply to the affected and surrounding

 areas of skin by topical route 2 times per day in the morning and evening     

 

                Vitamin D2 50,000 unit oral capsule    2011        take 1 capsule (50,000

 unit) by oral route once weekly        generic on list

 

                Pravachol 40 mg oral tablet    2012        take 1 tablet (40 mg) by oral 

route once daily for 90 days            generic on list

 

                lithium carbonate 300 mg oral capsule    2012        take 1 capsule by oral

 route daily                            dose updated

 

                Pristiq 100 mg oral tablet extended release 24 hr                    4/10/2012       take 1 and 1/2 

tablet (150 mg) by oral route once daily    Mental Health provider

 

                Pristiq 100 mg oral tablet extended release 24 hr                    4/10/2012       take 1 and 1/2 

tablet (150 mg) by oral route once daily    Discontinued by Dr Efrain Knight at [facility]

 

                hydroxyzine HCl 50 mg oral tablet    10/16/2014      2015       take 1 tablet (50 mg) 

by oral route at bedtime                 

 

                lithium carbonate 300 mg oral capsule    2015       take 1 capsule (300

 mg) by oral route 2 for 30 days         

 

                fluconazole 100 mg oral tablet    2015       12/3/2015       take 1 tablet (100 mg) by 

oral route once a week                   

 

                ketoconazole 2 % topical cream    2015       12/3/2015       apply to the affected area(s)

 by topical route 2 times per day        

 

                prednisone 10 mg oral tablet    12/3/2015       2016        take 2 tablets (20 mg) by oral

 route once daily for 4 days 1 tablet daily for 4 days 0.5 tablet daily for 4 
days                                     







Problem List







                    Description         Status              Onset

 

                    Artificial opening status; colostomy    Active               

 

                    Bipolar disorder, unspecified    Active               

 

                    Hyperlipidemia      Active               

 

                    Peritoneal Neoplasm, Malignant    Active               

 

                    Anemia, Pernicious    Active               

 

                    Arthritis unspecified    Active               

 

                    B12 deficiency      Active               







Vital Signs







      Date    Time    BP-Sys(mm[Hg]    BP-Lynn(mm[Hg])    HR(bpm)    RR(rpm)    Temp    WT    HT    HC    BMI

                    BSA                 BMI Percentile      O2 Sat(%)

 

       2016    3:11:00 PM    134 mmHg    76 mmHg    80 bpm    20 rpm    98 F    163 lbs    69 in     

                24.07 kg/m2     1.90 m2                         98 %

 

        2016    2:04:00 PM    142 mmHg    86 mmHg    68 bpm    16 rpm    98.5 F    166 lbs    63 in

                          29.4053 kg/m    1.8295 m                 100 %

 

        2016    11:27:00 AM    148 mmHg    78 mmHg    90 bpm    20 rpm    98.2 F    153 lbs    69 in

                          22.59 kg/m2    1.84 m2                   96 %

 

        12/3/2015    9:50:00 AM    132 mmHg    70 mmHg    62 bpm    16 rpm    97.9 F    145 lbs    69 in

                          21.4125 kg/m    1.7894 m                 100 %

 

        2015    8:52:00 AM    132 mmHg    68 mmHg    52 bpm    20 rpm    97.8 F    141 lbs    69 in

                          20.82 kg/m2    1.76 m2                   100 %

 

        2015    3:25:00 PM    120 mmHg    62 mmHg    72 bpm    16 rpm    98.1 F    136 lbs    69 in

                          20.0835 kg/m    1.733 m                 98 %

 

       3/23/2015    2:55:00 PM    130 mmHg    76 mmHg    68 bpm    18 rpm    97 F    140 lbs    69 in    

                20.67 kg/m2     1.76 m2                         98 %

 

        10/16/2014    11:11:00 AM    120 mmHg    66 mmHg    77 bpm    20 rpm    98 F    130 lbs    69 in

                          19.1974 kg/m    1.6943 m                 100 %

 

        2014    3:21:00 PM    130 mmHg    66 mmHg    63 bpm    18 rpm    97.2 F    160 lbs    69 in

                          23.63 kg/m2    1.88 m2                   99 %

 

        2013    10:35:00 AM    132 mmHg    70 mmHg    66 bpm    20 rpm    98.1 F    157 lbs    69 in

                          23.1846 kg/m    1.862 m                  

 

        2013    1:29:00 PM    132 mmHg    70 mmHg    76 bpm    18 rpm    98.2 F    166 lbs    69 in 

                          24.51 kg/m2    1.91 m2                    

 

       2013    2:46:00 PM    128 mmHg    70 mmHg    76 bpm    16 rpm    98 F    160 lbs    69 in     

                23.6276 kg/m    1.8797 m                       

 

        2011    8:49:00 AM    128 mmHg    78 mmHg    70 bpm    18 rpm    97.9 F    164 lbs    69 in

                          24.22 kg/m2    1.90 m2                    

 

     2011    1:31:00 PM    132 mmHg    68 mmHg    84 bpm         97 F    167 lbs                        

                                         

 

        2011    9:09:00 AM    128 mmHg    70 mmHg    72 bpm    18 rpm    98.2 F    163 lbs    64 in 

                          27.98 kg/m2    1.83 m2                    

 

       2011    10:01:00 AM    132 mmHg    70 mmHg    72 bpm    18 rpm    98.2 F    154 lbs             

                                                                 

 

       2011    2:47:00 PM    128 mmHg    70 mmHg    72 bpm    18 rpm    97.8 F    156 lbs             

                                                                 

 

       5/10/2011    3:16:00 PM    144 mmHg    80 mmHg    72 bpm    18 rpm    98.2 F    158 lbs             

                                                                 

 

        2011    10:11:00 AM    132 mmHg    70 mmHg    70 bpm    18 rpm    98.2 F    168 lbs    69 in

                          24.81 kg/m2    1.93 m2                    

 

        4/15/2011    10:52:00 AM    110 mmHg    60 mmHg    75 bpm    16 rpm    97.5 F    172.375 lbs    

69 in                     25.4551 kg/m    1.951 m                 100 %

 

        2011    11:43:00 AM    120 mmHg    82 mmHg    75 bpm    16 rpm    97.2 F    178.5 lbs    69

 in                       26.36 kg/m2    1.99 m2                   100 %

 

        10/15/2010    1:32:00 PM    120 mmHg    70 mmHg    80 bpm    18 rpm    96.6 F    177 lbs    69 in

                          26.1381 kg/m    1.977 m                 100 %

 

        2010    3:50:00 PM    168 mmHg    100 mmHg    82 bpm    18 rpm    97.8 F    177.5 lbs    69

 in                       26.21 kg/m2    1.98 m2                   97 %

 

        2010    1:21:00 PM    140 mmHg    80 mmHg    59 bpm    16 rpm    97.6 F    173.25 lbs    69 

in                        25.5843 kg/m    1.956 m                 100 %

 

        2010    3:02:00 PM    140 mmHg    80 mmHg    61 bpm    16 rpm    97.6 F    173.125 lbs    69

 in                       25.57 kg/m2    1.96 m2                   99 %

 

        2010    1:23:00 PM    130 mmHg    80 mmHg    66 bpm    16 rpm    96.8 F    173 lbs    69 in 

                          25.5474 kg/m    1.9546 m                 100 %

 

        2010    12:58:00 PM    130 mmHg    88 mmHg    75 bpm    16 rpm    98.4 F    172.25 lbs    69

 in                       25.44 kg/m2    1.95 m2                   100 %







Social History







                    Name                Description         Comments

 

                    denies alcohol use                         

 

                    denies smoking                           

 

                    Denies illicit substance abuse                         

 

                    retired                                 direct care

 

                    Single                                   

 

                    Exercises regularly                         

 

                    Attended some college                         

 

                    Tobacco             Never smoker         







History of Procedures







                    Date Ordered        Description         Order Status

 

                    2010 12:00 AM    COMPREHEN METABOLIC PANEL    Reviewed

 

                    2010 12:00 AM    COMPLETE CBC W/AUTO DIFF WBC    Reviewed

 

                    2010 12:00 AM    LIPID PANEL         Reviewed

 

                          2015 12:00 AM        B12 Injection, Up to 1000 Mcg NDC#0115-8588-02 [facility] Medicare 

                                        Reviewed

 

                    2011 12:00 AM    MAMMOGRAM SCREENING    Reviewed

 

                    2011 12:00 AM    CYTOPATH C/V THIN LAYER    Reviewed

 

                    2011 12:00 AM    B12 Injection 1 cc NDC#[address]-22    Reviewed

 

                    2015 12:00 AM    THER/PROPH/DIAG INJ SC/IM    Reviewed

 

                    2015 12:00 AM    B12 Injection, Up to 1000 Mcg NDC#0196-6661-09    Reviewed

 

                    2011 12:00 AM    THER/PROPH/DIAG INJ SC/IM    Reviewed

 

                    2011 12:00 AM    B12 Injection([place]) Ndc#7911-4733-49-    Reviewed

 

                    2015 12:00 AM    THER/PROPH/DIAG INJ SC/IM    Reviewed

 

                    2015 12:00 AM    B12 Injection, Up to 1000 Mcg NDC#5295-7700-72    Reviewed

 

                    10/20/2011 12:00 AM    THER/PROPH/DIAG INJ SC/IM    Reviewed

 

                    10/20/2011 12:00 AM    B12 Injection([place]) Ndc#2062-1018-12-    Reviewed

 

                    2016 12:00 AM    THER/PROPH/DIAG INJ SC/IM    Reviewed

 

                    2016 12:00 AM    B12 Injection, Up to 1000 Mcg NDC#8655-3701-47    Reviewed

 

                    3/14/2016 12:00 AM    VITAMIN B-12        Reviewed

 

                    3/15/2016 12:00 AM    THER/PROPH/DIAG INJ SC/IM    Reviewed

 

                    3/15/2016 12:00 AM    B12 Injection, Up to 1000 Mcg NDC#4130-1101-62    Reviewed

 

                    2011 12:00 AM    ***Immunization administration, Medicare flu    Reviewed

 

                    2011 12:00 AM    Fluzone ** MEDICARE Only **    Reviewed

 

                    2011 12:00 AM    THER/PROPH/DIAG INJ SC/IM    Reviewed

 

                    2011 12:00 AM    B12 Injection ([employer] [facility]) Ndc#2692-8333-49    Reviewed

 

                    2016 12:00 AM    B12 Injection, Up to 1000 Mcg NDC#3761-4925-04 [facility] Medicare    

Reviewed

 

                    2016 12:00 AM    TTE W/DOPPLER COMPLETE    Reviewed

 

                    2016 12:00 AM    EXTREMITY STUDY     Returned

 

                          2016 12:00 AM        B12 Injection, Up to 1000 Mcg NDC#9942-8644-86 [facility] Medicare 

                                        Reviewed

 

                    2016 12:00 AM    THER/PROPH/DIAG INJ SC/IM    Reviewed

 

                    2012 12:00 AM    THER/PROPH/DIAG INJ SC/IM    Reviewed

 

                    2012 12:00 AM    B12 Injection ([employer]) Ndc#4178-5037-71    Reviewed

 

                    2016 12:00 AM    THER/PROPH/DIAG INJ SC/IM    Reviewed

 

                    2016 12:00 AM    B12 Injection, Up to 1000 Mcg NDC#7048-0216-53    Reviewed

 

                    2012 12:00 AM    THER/PROPH/DIAG INJ SC/IM    Reviewed

 

                    2012 12:00 AM    B12 Injection([place]) Ndc#1529-6854-52-    Reviewed

 

                    12/15/2016 12:00 AM    B12 Injection, Up to 1000 Mcg NDC#4384-0396-86    Reviewed

 

                    12/15/2016 12:00 AM    THER/PROPH/DIAG INJ SC/IM    Reviewed

 

                    5/3/2012 12:00 AM    THER/PROPH/DIAG INJ SC/IM    Reviewed

 

                    5/3/2012 12:00 AM    B12 Injection([place]) Ndc#5107-7006-22-    Reviewed

 

                    2012 12:00 AM    IMMUNOTHERAPY INJECTIONS    Reviewed

 

                    2012 12:00 AM    B12 Injection([place]) Ndc#5738-3665-75-    Reviewed

 

                    2012 12:00 AM    THER/PROPH/DIAG INJ SC/IM    Reviewed

 

                    2012 12:00 AM    B12 Injection, Up to 1000 Mcg NDC#8268-8588-24    Reviewed

 

                    2012 12:00 AM    THER/PROPH/DIAG INJ SC/IM    Reviewed

 

                    2012 12:00 AM    B12 Injection, Up to 1000 Mcg NDC#6178-7395-90    Reviewed

 

                    2012 12:00 AM    THER/PROPH/DIAG INJ SC/IM    Reviewed

 

                    2012 12:00 AM    B12 Injection, Up to 1000 Mcg NDC#6157-7452-26    Reviewed

 

                    10/16/2012 12:00 AM    THER/PROPH/DIAG INJ SC/IM    Reviewed

 

                    10/16/2012 12:00 AM    B12 Injection, Up to 1000 Mcg NDC#9757-1919-39    Reviewed

 

                    2010 12:00 AM    COMPREHEN METABOLIC PANEL    Reviewed

 

                    2010 12:00 AM    COMPLETE CBC W/AUTO DIFF WBC    Reviewed

 

                    2010 12:00 AM    LIPID PANEL         Reviewed

 

                    2013 12:00 AM    Flu Injection 3 Years And Above NDC# [address]-95  [facility]    Reviewed



 

                    2013 12:00 AM    COMPLETE CBC W/AUTO DIFF WBC    Reviewed

 

                    2013 12:00 AM    ASSAY OF LITHIUM    Reviewed

 

                    2013 12:00 AM    METABOLIC PANEL TOTAL CA    Reviewed

 

                    4/3/2013 12:00 AM    THER/PROPH/DIAG INJ SC/IM    Reviewed

 

                    4/3/2013 12:00 AM    B12 Injection, Up to 1000 Mcg NDC#5984-9959-02    Reviewed

 

                    2013 12:00 AM    THER/PROPH/DIAG INJ SC/IM    Reviewed

 

                    2013 12:00 AM    B12 Injection, Up to 1000 Mcg NDC#1153-3726-19    Reviewed

 

                    2013 12:00 AM    THER/PROPH/DIAG INJ SC/IM    Reviewed

 

                    2013 12:00 AM    B12 Injection, Up to 1000 Mcg NDC#4570-4415-38    Reviewed

 

                    2013 12:00 AM    LIPID PANEL         Reviewed

 

                    2013 12:00 AM    VITAMIN D 25 HYDROXY    Reviewed

 

                    2013 12:00 AM    THER/PROPH/DIAG INJ SC/IM    Reviewed

 

                    3/6/2014 12:00 AM    THER/PROPH/DIAG INJ SC/IM    Reviewed

 

                    2014 12:00 AM    THER/PROPH/DIAG INJ SC/IM    Reviewed

 

                    2014 12:00 AM    B12 Injection, Up to 1000 Mcg NDC#5333-8473-05    Reviewed

 

                    2010 12:00 AM    SKIN FUNGI CULTURE    Reviewed

 

                    10/9/2010 12:00 AM    COMPREHEN METABOLIC PANEL    Reviewed

 

                    10/9/2010 12:00 AM    LIPID PANEL         Reviewed

 

                    2010 12:00 AM    THER/PROPH/DIAG INJ SC/IM    Reviewed

 

                    2010 12:00 AM    B12 Injection Ndc#[address]-88 ([place])    Reviewed

 

                    2010 12:00 AM    THER/PROPH/DIAG INJ SC/IM    Reviewed

 

                    2010 12:00 AM    Kenalog 40 Mg Im-Ndc#[address]-61 ([place])    Reviewed

 

                    10/15/2010 12:00 AM    FLU VACCINE 3 YRS & > IM    Reviewed

 

                    10/15/2010 12:00 AM    Admin.Of M/C Cov.Vaccine-Flu Vacc.    Reviewed

 

                    1/15/2011 12:00 AM    COMPLETE CBC W/AUTO DIFF WBC    Reviewed

 

                    1/15/2011 12:00 AM    COMPREHEN METABOLIC PANEL    Reviewed

 

                    1/15/2011 12:00 AM    LIPID PANEL         Reviewed

 

                    2014 12:00 AM    MAMMOGRAM SCREENING    Reviewed

 

                    2014 12:00 AM    Screening mammography, bilateral    Reviewed

 

                    7/10/2014 12:00 AM    THER/PROPH/DIAG INJ SC/IM    Reviewed

 

                    7/10/2014 12:00 AM    B12 Injection, Up to 1000 Mcg NDC#0824-1647-70    Reviewed

 

                    2011 12:00 AM    COMPLETE CBC W/AUTO DIFF WBC    Reviewed

 

                    2011 12:00 AM    COMPREHEN METABOLIC PANEL    Reviewed

 

                    2011 12:00 AM    LIPID PANEL         Reviewed

 

                    2014 12:00 AM    B12 Injection, Up to 1000 Mcg NDC#0885-5734-77    Reviewed

 

                    10/19/2014 12:00 AM    MAMMOGRAM SCREENING    Reviewed

 

                    10/19/2014 12:00 AM    Screening mammography, bilateral    Reviewed

 

                    10/16/2014 12:00 AM    COMPLETE CBC W/AUTO DIFF WBC    Reviewed

 

                    10/16/2014 12:00 AM    COMPREHEN METABOLIC PANEL    Reviewed

 

                    10/16/2014 12:00 AM    IMMUNOASSAY TUMOR     Reviewed

 

                    10/16/2014 12:00 AM    LIPID PANEL         Reviewed

 

                    10/16/2014 12:00 AM    ASSAY OF LITHIUM    Reviewed

 

                    10/16/2014 12:00 AM    MAMMOGRAM SCREENING    Reviewed

 

                    2011 12:00 AM    ASSAY OF PARATHORMONE    Reviewed

 

                    2011 12:00 AM    VITAMIN D 25 HYDROXY    Reviewed

 

                    2011 12:00 AM    ASSAY OF LITHIUM    Reviewed

 

                    2011 12:00 AM    METABOLIC PANEL TOTAL CA    Reviewed

 

                    2011 12:00 AM    CT HEAD/BRAIN W/O & W/DYE    Reviewed

 

                    3/23/2015 12:00 AM    PNEUMOCOCCAL VACC 13 GLENDY IM    Reviewed

 

                    3/23/2015 12:00 AM    Vitamin B12 injection    Reviewed

 

                    2011 12:00 AM    ASSAY OF LITHIUM    Reviewed

 

                    2011 12:00 AM    B12 Injection Ndc#[address]-21  [facility]    Reviewed

 

                    2015 12:00 AM    THER/PROPH/DIAG INJ SC/IM    Reviewed

 

                    2015 12:00 AM    B12 Injection, Up to 1000 Mcg NDC#5851-4148-01    Reviewed

 

                    2015 12:00 AM    COMPLETE CBC W/AUTO DIFF WBC    Reviewed

 

                    2015 12:00 AM    COMPREHEN METABOLIC PANEL    Reviewed

 

                    2015 12:00 AM    LIPID PANEL         Reviewed

 

                    2015 12:00 AM    ASSAY OF LITHIUM    Reviewed

 

                    2011 12:00 AM    VIT D 1 25-DIHYDROXY    Reviewed

 

                    2011 12:00 AM    VITAMIN B-12        Reviewed

 

                    2015 12:00 AM    B12 Injection, Up to 1000 Mcg NDC#2220-0665-36    Reviewed

 

                    2015 12:00 AM    THER/PROPH/DIAG INJ SC/IM    Reviewed

 

                    2015 12:00 AM    B12 Injection, Up to 1000 Mcg NDC#7077-2868-23    Reviewed

 

                    2011 12:00 AM    THER/PROPH/DIAG INJ SC/IM    Reviewed

 

                    2011 12:00 AM    B12 Injection ([employer]) Ndc#7317-9169-81    Reviewed

 

                    2015 12:00 AM    THER/PROPH/DIAG INJ SC/IM    Reviewed

 

                    2015 12:00 AM    B12 Injection, Up to 1000 Mcg NDC#1413-1440-58    Reviewed







Results Summary







                          Data and Description      Results

 

                          2004 12:00 AM        Colonoscopy-Women and Men over 50 Normal 

 

                          2008 12:00 AM         Pap Smear Declined 

 

                          10/7/2009 12:00 AM        Cholest Cry Stone Ql .0 %LDLc SerPl-mCnc 123.0 mg/dLHDLc

 SerPl-mCnc 34.0 mg/dLTrigl SerPl-mCnc 190.0 mg/dLGlucose SerPl-mCnc 78.0 mg/dL

 

                          2009 12:00 AM        Mammogram -Women over 40 Normal HIV1+2 Ab Ser Ql no risk 

 

                          2010 8:47 AM         Dexa Bone Scan Refused Aspirin reccommended Contraindication 



 

                          2010 8:48 AM         Depression Done 

 

                          2010 12:00 AM         Foot Exam-Diabetic Done 

 

                          2010 12:00 AM         Cholest Cry Stone Ql .0 %LDLc SerPl-mCnc 126.0 mg/dLGlucose

 SerPl-mCnc 102.0 mg/dL

 

                          2010 8:45 AM          TRIGLYCERIDES 122.0 mg/dLCHOLESTEROL 186.0 mg/dLHDL 36.0 mg/dLTOT

 CHOL/HDL 5.2 LDL (CALC) 126.0 mg/dLGLUCOSE 102.0 mg/dLSODIUM 143.0 
mmol/LPOTASSIUM 3.70 mmol/LCHLORIDE 111.0 mmol/LCO2 23.0 mmol/LBUN 10.0 
mg/dLCREATININE 0.80 mg/dLSGOT/AST 12.0 IU/LSGPT/ALT 11.0 IU/LALK PHOS 65.0 
IU/LTOTAL PROTEIN 7.20 g/dLALBUMIN 3.90 g/dLTOTAL BILI 0.50 mg/dLCALCIUM 10.20 
mg/dLAGE 59 GFR NonAA 73 GFR AA 88 eGFR >60 mL/min/1.73 m2eGFR AA* >60 WBC 5.7 
RBC 3.26 HGB 10.60 g/dLHCT 31.70 %MCV 97.0 fLMCH 32.50 pgMCHC 33.40 g/dLRDW SD 
47 RDW CV 13.30 %MPV 9.70 fLPLT 287 NRBC# 0.00 NRBC% 0.0 %NEUT 62.90 %%LYMP 
21.80 %%MONO 9.90 %%EOS 5.0 %%BASO 0.40 %#NEUT 3.56 #LYMP 1.23 #MONO 0.56 #EOS 
0.28 #BASO 0.02 MANUAL DIFF NOT IND 

 

                          2010 12:00 AM        Glucose SerPl-mCnc 96.0 mg/dLCholest Cry Stone Ql .0 %LDLc

 SerPl-mCnc 146.0 mg/dL

 

                          2010 8:26 AM         TRIGLYCERIDES 106.0 mg/dLCHOLESTEROL 199.0 mg/dLHDL 32.0 mg/dLTOT

 CHOL/HDL 6.2 LDL (CALC) 146.0 mg/dLGLUCOSE 96.0 mg/dLSODIUM 143.0 
mmol/LPOTASSIUM 4.0 mmol/LCHLORIDE 113.0 mmol/LCO2 24.0 mmol/LBUN 13.0 
mg/dLCREATININE 1.0 mg/dLSGOT/AST 11.0 IU/LSGPT/ALT 6.0 IU/LALK PHOS 56.0 
IU/LTOTAL PROTEIN 6.60 g/dLALBUMIN 3.80 g/dLTOTAL BILI 0.50 mg/dLCALCIUM 9.30 
mg/dLAGE 59 GFR NonAA 57 GFR AA 69 eGFR 57 eGFR AA* >60 

 

                          10/6/2010 12:00 AM        Cholest Cry Stone Ql .0 %LDLc SerPl-mCnc 111.0 mg/dLGlucose

 SerPl-mCnc 81.0 mg/dL

 

                          10/6/2010 2:45 PM         TRIGLYCERIDES 123.0 mg/dLCHOLESTEROL 178.0 mg/dLHDL 42.0 mg/dLTOT

 CHOL/HDL 4.2 LDL (CALC) 111.0 mg/dLGLUCOSE 81.0 mg/dLSODIUM 139.0 
mmol/LPOTASSIUM 4.10 mmol/LCHLORIDE 106.0 mmol/LCO2 24.0 mmol/LBUN 13.0 
mg/dLCREATININE 0.90 mg/dLSGOT/AST 13.0 IU/LSGPT/ALT 11.0 IU/LALK PHOS 61.0 
IU/LTOTAL PROTEIN 7.10 g/dLALBUMIN 3.90 g/dLTOTAL BILI 0.30 mg/dLCALCIUM 9.30 
mg/dLAGE 60 GFR NonAA 64 GFR AA 78 eGFR >60 mL/min/1.73 m2eGFR AA* >60 WBC 6.9 
RBC 3.59 HGB 11.50 g/dLHCT 35.30 %MCV 98.0 fLMCH 32.0 pgMCHC 32.60 g/dLRDW SD 46
 RDW CV 12.90 %MPV 9.90 fLPLT 311 NRBC# 0.00 NRBC% 0.0 %NEUT 64.90 %%LYMP 22.50 
%%MONO 7.20 %%EOS 5.10 %%BASO 0.30 %#NEUT 4.45 #LYMP 1.54 #MONO 0.49 #EOS 0.35 
#BASO 0.02 MANUAL DIFF NOT IND 

 

                          2011 12:00 AM         Mammogram -Women over 40 Ordered 

 

                          2011 10:25 AM        TRIGLYCERIDES 111.0 mg/dLCHOLESTEROL 195.0 mg/dLHDL 43.0 mg/dLTOT

 CHOL/HDL 4.5 LDL (CALC) 130.0 mg/dLWBC 5.3 RBC 3.76 HGB 12.0 g/dLHCT 37.80 %MCV
 101.0 fLMCH 31.90 pgMCHC 31.70 g/dLRDW SD 47 RDW CV 13.0 %MPV 9.70 fLPLT 259 
NRBC# 0.00 NRBC% 0.0 %NEUT 69.0 %%LYMP 17.60 %%MONO 8.30 %%EOS 4.70 %%BASO 0.40 
%#NEUT 3.63 #LYMP 0.93 #MONO 0.44 #EOS 0.25 #BASO 0.02 MANUAL DIFF NOT IND 
GLUCOSE 102.0 mg/dLSODIUM 146.0 mmol/LPOTASSIUM 4.20 mmol/LCHLORIDE 113.0 
mmol/LCO2 23.0 mmol/LBUN 15.0 mg/dLCREATININE 1.0 mg/dLSGOT/AST 12.0 
IU/LSGPT/ALT 17.0 IU/LALK PHOS 60.0 IU/LTOTAL PROTEIN 6.90 g/dLALBUMIN 4.20 
g/dLTOTAL BILI 0.40 mg/dLCALCIUM 9.70 mg/dLAGE 60 GFR NonAA 57 GFR AA 69 eGFR 57
 eGFR AA* >60 

 

                          2011 11:49 AM        Cholest Cry Stone Ql .0 %LDLc SerPl-mCnc 130.0 mg/dLHDLc

 SerPl-mCnc 43.0 mg/dLTrigl SerPl-mCnc 111.0 mg/dLGlucose SerPl-mCnc 102.0 mg/dL

 

                          2011 11:52 AM        Pap Smear Declined 

 

                          2011 11:28 AM        Lithium 2.080 mmol/LGLUCOSE 102.0 mg/dLSODIUM 135.0 mmol/LPOTASSIUM

 3.90 mmol/LCHLORIDE 106.0 mmol/LCO2 21.0 mmol/LBUN 12.0 mg/dLCREATININE 1.30 
mg/dLCALCIUM 10.70 mg/dLAGE 60 GFR NonAA 42 GFR AA 51 eGFR 42 eGFR AA* 51 

 

                          2011 8:58 AM          Lithium 0.690 mmol/L

 

                          2011 2:38 PM         VITAMIN B12 3483.0 pg/mL

 

                          2013 3:35 PM          WBC 5.1 RBC 3.73 HGB 11.70 g/dLHCT 36.40 %MCV 98.0 fLMCH 31.40

 pgMCHC 32.10 g/dLRDW SD 47 RDW CV 13.10 %MPV 9.80 fLPLT 224 NRBC# 0.00 NRBC% 
0.0 %NEUT 66.80 %%LYMP 19.10 %%MONO 9.0 %%EOS 4.90 %%BASO 0.20 %#NEUT 3.42 #LYMP
 0.98 #MONO 0.46 #EOS 0.25 #BASO 0.01 MANUAL DIFF NOT IND GLUCOSE 88.0 
mg/dLSODIUM 141.0 mmol/LPOTASSIUM 4.10 mmol/LCHLORIDE 110.0 mmol/LCO2 22.0 
mmol/LBUN 22.0 mg/dLCREATININE 1.10 mg/dLCALCIUM 9.80 mg/dLAGE 62 GFR NonAA 50 
GFR AA 61 eGFR 50 eGFR AA* 60 Lithium 0.760 mmol/L

 

                          2013 11:02 AM        TRIGLYCERIDES 106.0 mg/dLCHOLESTEROL 181.0 mg/dLHDL 46.0 mg/dLTOT

 CHOL/HDL 3.9 LDL (CALC) 114.0 mg/dLVITAMIN D 41.10 ng/mL

 

                          10/17/2014 10:10 AM       WBC 5.0 RBC 3.66 HGB 11.60 g/dLHCT 36.80 %.0 fLMCH 31.70

 pgMCHC 31.50 g/dLRDW SD 50 RDW CV 13.50 %MPV 10.10 fLPLT 209 NRBC# 0.00 NRBC% 
0.0 %NEUT 69.20 %%LYMP 21.0 %%MONO 6.40 %%EOS 3.20 %%BASO 0.20 %#NEUT 3.46 #LYMP
 1.05 #MONO 0.32 #EOS 0.16 #BASO 0.01 MANUAL DIFF NOT IND GLUCOSE 100.0 
mg/dLSODIUM 148.0 mmol/LPOTASSIUM 3.90 mmol/LCHLORIDE 114.0 mmol/LCO2 26.0 
mmol/LBUN 12.0 mg/dLCREATININE 1.20 mg/dLSGOT/AST 9.0 IU/LSGPT/ALT <6 IU/LALK 
PHOS 82.0 IU/LTOTAL PROTEIN 6.90 g/dLALBUMIN 4.0 g/dLTOTAL BILI 0.40 
mg/dLCALCIUM 10.50 mg/dLAGE 64 GFR NonAA 45 GFR AA 55 eGFR 45 eGFR AA* 55 
TRIGLYCERIDES 96.0 mg/dLCHOLESTEROL 155.0 mg/dLHDL 38.0 mg/dLTOT CHOL/HDL 4.1 
LDL (CALC) 98.0 mg/dLLithium 0.850 mmol/LCancer Antigen (CA) 125 8.30 U/mL

 

                          2015 10:25 AM        Lithium 0.790 mmol/LWBC 4.8 RBC 3.44 HGB 11.0 g/dLHCT 35.20 

%.0 fLMCH 32.0 pgMCHC 31.30 g/dLRDW SD 53 RDW CV 14.0 %MPV 9.30 fLPLT 210
 NRBC# 0.00 NRBC% 0.0 %NEUT 70.80 %%LYMP 17.20 %%MONO 8.10 %%EOS 3.50 %%BASO 
0.40 %#NEUT 3.41 #LYMP 0.83 #MONO 0.39 #EOS 0.17 #BASO 0.02 MANUAL DIFF NOT IND 
TRIGLYCERIDES 107.0 mg/dLCHOLESTEROL 174.0 mg/dLHDL 43.0 mg/dLTOT CHOL/HDL 4.0 
LDL (CALC) 110.0 mg/dLGLUCOSE 90.0 mg/dLSODIUM 145.0 mmol/LPOTASSIUM 3.80 
mmol/LCHLORIDE 115.0 mmol/LCO2 24.0 mmol/LBUN 17.0 mg/dLCREATININE 1.30 
mg/dLSGOT/AST 18.0 IU/LSGPT/ALT 17.0 IU/LALK PHOS 56.0 IU/LTOTAL PROTEIN 6.70 
g/dLALBUMIN 3.90 g/dLTOTAL BILI 0.40 mg/dLCALCIUM 9.80 mg/dLAGE 64 GFR NonAA 41 
GFR AA 50 eGFR 41 eGFR AA* 50 

 

                          2015 8:50 AM        WBC 5.8 RBC 3.29 HGB 10.70 g/dLHCT 34.0 %.0 fLMCH 32.50

 pgMCHC 31.50 g/dLRDW SD 52 RDW CV 13.60 %MPV 9.60 fLPLT 223 NRBC# 0.00 NRBC% 
0.0 %NEUT 69.60 %%LYMP 18.90 %%MONO 8.50 %%EOS 2.80 %%BASO 0.20 %#NEUT 4.03 
#LYMP 1.09 #MONO 0.49 #EOS 0.16 #BASO 0.01 MANUAL DIFF NOT IND Lithium 0.620 
mmol/LGLUCOSE 83.0 mg/dLSODIUM 139.0 mmol/LPOTASSIUM 3.90 mmol/LCHLORIDE 109.0 
mmol/LCO2 22.0 mmol/LBUN 19.0 mg/dLCREATININE 1.40 mg/dLSGOT/AST 19.0 
IU/LSGPT/ALT 21.0 IU/LALK PHOS 55.0 IU/LTOTAL PROTEIN 6.50 g/dLALBUMIN 3.90 
g/dLTOTAL BILI 0.50 mg/dLCALCIUM 9.60 mg/dLAGE 65 GFR NonAA 38 GFR AA 46 eGFR 38
 eGFR AA* 46 TRIGLYCERIDES 121.0 mg/dLCHOLESTEROL 192.0 mg/dLHDL 51.0 mg/dLTOT 
CHOL/HDL 3.8 .0 mg/dLFREE T4 0.79 TSH 1.210 uIU/mLHemoglobin A1c 5.40 
%Estim. Avg Glu (eAG) 108 

 

                          3/15/2016 8:08 AM         VITAMIN B12 696.0 pg/mL

 

                          3/23/2016 8:26 AM         WBC 7.0 RBC 3.61 HGB 11.80 g/dLHCT 37.70 %.0 fLMCH 32.70

 pgMCHC 31.30 g/dLRDW SD 49 RDW CV 12.50 %MPV 10.0 fLPLT 207 NRBC# 0.00 NRBC% 
0.0 %NEUT 73.60 %%LYMP 16.40 %%MONO 6.60 %%EOS 3.0 %%BASO 0.30 %#NEUT 5.15 #LYMP
 1.15 #MONO 0.46 #EOS 0.21 #BASO 0.02 MANUAL DIFF NOT IND Lithium 0.940 
mmol/LGLUCOSE 108.0 mg/dLSODIUM 143.0 mmol/LPOTASSIUM 4.30 mmol/LCHLORIDE 110.0 
mmol/LCO2 27.0 mmol/LBUN 16.0 mg/dLCREATININE 1.60 mg/dLSGOT/AST 13.0 
IU/LSGPT/ALT 7.0 IU/LALK PHOS 71.0 IU/LTOTAL PROTEIN 6.80 g/dLALBUMIN 4.0 
g/dLTOTAL BILI 0.20 mg/dLCALCIUM 10.40 mg/dLAGE 65 GFR NonAA 32 GFR AA 39 eGFR 
32 eGFR AA* 39 TRIGLYCERIDES 113.0 mg/dLCHOLESTEROL 169.0 mg/dLHDL 42.0 mg/dLTOT
 CHOL/HDL 4.0 LDL (CALC) 104.0 mg/dLFREE T4 0.86 TSH 2.20 uIU/mLHemoglobin A1c 
5.20 %Estim. Avg Glu (eAG) 103 

 

                          3/25/2016 9:17 AM         COLOR YELLOW APPEARANCE CLEAR SPEC GRAV 1.010 pH 7.0 PROTEIN 

NEGATIVE GLUCOSE NEGATIVE mg/dLKETONE NEGATIVE BILIRUBIN NEGATIVE BLOOD NEGATIVE
 NITRITE NEGATIVE LEUK SCREEN SMALL MICRO IND? SEE BELOW WBC/HPF 0-5 RBC/HPF 
NEGATIVE CASTS/LPF NEGATIVE /LPFCRYSTALS NEGATIVE MUCOUS THRDS NEGATIVE BACTERIA
 NEGATIVE EPITH CELLS FEW SQUAMOUS /HPFTRICHOMONAS NEGATIVE YEAST NEGATIVE 

 

                          2016 6:00 AM        GLUCOSE 91.0 mg/dLSODIUM 143.0 mmol/LPOTASSIUM 3.60 mmol/LCHLORIDE

 112.0 mmol/LCO2 23.0 mmol/LBUN 22.0 mg/dLCREATININE 1.20 mg/dLSGOT/AST 15.0 
IU/LSGPT/ALT 12.0 IU/LALK PHOS 61.0 IU/LTOTAL PROTEIN 5.40 g/dLALBUMIN 3.10 
g/dLTOTAL BILI 0.40 mg/dLCALCIUM 8.40 mg/dLAGE 66 GFR NonAA 45 GFR AA 55 eGFR 45
 eGFR AA* 55 WBC 3.0 RBC 3.05 HGB 9.80 g/dLHCT 32.10 %.0 fLMCH 32.10 
pgMCHC 30.50 g/dLRDW SD 54 RDW CV 14.20 %MPV 10.10 fLPLT 170 NRBC# 0.00 NRBC% 
0.0 %NEUT 50.70 %%LYMP 32.60 %%MONO 10.50 %%EOS 5.90 %%BASO 0.30 %#NEUT 1.54 
#LYMP 0.99 #MONO 0.32 #EOS 0.18 #BASO 0.01 MANUAL DIFF NOT IND 







History Of Immunizations







       Name    Date Admin    Mfg Name    Mfg Code    Trade Name    Lot#    Route    Inj    Vis Given    Vis

 Pub                                    CVX

 

        Influenza    2008    Not Entered    NE      Not Entered            Not Entered    Not Entered

                    1            999

 

        X       12/19/2008    [employer]    MSD     Pneumovax 23            Intramuscular    Not Entered

                    1            999

 

           Influenza    10/15/2010    [employer]    NOV        Fluvirin > 12 Years    

[id number]     Intramuscular    Left Deltoid    10/15/2010    2009    999

 

          X         3/23/2015    [employer]    [facility]       Prevnar 13    [id number]    Intramuscular

                Right Gluteous Medius    3/23/2015       2013       109







History of Past Illness







                    Name                Date of Onset       Comments

 

                    Peritoneal Neoplasm, Malignant                         

 

                    Hyperlipidemia                           

 

                    Bipolar disorder, unspecified                         

 

                    Artificial opening status; colostomy                         

 

                    B12 deficiency                           

 

                    Anemia, Pernicious                         

 

                    Arthritis unspecified                         

 

                    cervical cancer                          

 

                    Artificial opening status; colostomy    2010  1:10PM     

 

                    Bipolar disorder, unspecified    2010  1:10PM     

 

                    Hyperlipidemia      2010  1:10PM     

 

                    Anemia, Pernicious    2010  1:10PM     

 

                    Postoperative Follow-Up    2010  1:55PM     

 

                    Postoperative Follow-Up    Mar  8 2010 10:57AM     

 

                    Artificial opening status; colostomy    Mar  8 2010  1:19PM     

 

                    Peritoneal Neoplasm, Malignant    Mar  8 2010  1:19PM     

 

                    Artificial opening status; colostomy    2010  1:40PM     

 

                    Hyperlipidemia      2010  1:40PM     

 

                    Anemia, Pernicious    2010  1:40PM     

 

                    Peritoneal Neoplasm, Malignant    2010  1:40PM     

 

                    Arthritis unspecified    2010  1:40PM     

 

                    Anemia of Chronic Illness    2010  1:40PM     

 

                    Tinea corporis      2010  3:17PM     

 

                    Bipolar disorder, unspecified    2010  1:33PM     

 

                    Hyperlipidemia      2010  1:33PM     

 

                    Anemia, Pernicious    2010  1:33PM     

 

                    Peritoneal Neoplasm, Malignant    2010  1:33PM     

 

                    B12 deficiency      2010  1:33PM     

 

                    Ethmoidal Sinusitis, Acute    Sep 21 2010  3:53PM     

 

                    Wheezing            Sep 21 2010  3:53PM     

 

                    Flu                 Oct 15 2010  1:40PM     

 

                    Bipolar disorder, unspecified    Oct 15 2010  1:42PM     

 

                    Hyperlipidemia      Oct 15 2010  1:42PM     

 

                    Anemia, Pernicious    Oct 15 2010  1:42PM     

 

                    Peritoneal Neoplasm, Malignant    Oct 15 2010  1:42PM     

 

                    Bipolar disorder, unspecified    2011 12:01PM     

 

                    Hyperlipidemia      2011 12:01PM     

 

                    Anemia, Pernicious    2011 12:01PM     

 

                    Peritoneal Neoplasm, Malignant    2011 12:01PM     

 

                    Bipolar disorder, unspecified    Apr 15 2011 10:55AM     

 

                    Major Depression    2011 10:11AM     

 

                    Bipolar Disorder    2011 10:11AM     

 

                    Cancer              May 10 2011  4:16PM     

 

                    Major Depression    May 10 2011  3:16PM     

 

                    Bipolar Disorder    May 10 2011  3:16PM     

 

                    Hypercalcemia       May 23 2011  2:47PM     

 

                    Bipolar disorder, unspecified    May 23 2011  2:47PM     

 

                    Colon Cancer, Personal History    May 23 2011  2:47PM     

 

                    Bipolar Disorder    May 31 2011  4:39PM     

 

                    Depressive Disorder    2011 10:01AM     

 

                    Vitamin B12 deficiency    2011 10:01AM     

 

                    Vitamin D Deficiency    2011  5:07PM     

 

                    Anemia, Vitamin B12 Deficiency    2011  5:07PM     

 

                    B12 deficiency      2011  3:56PM     

 

                    Routine gynecological examination    Aug  4 2011  9:08AM     

 

                    Screening Examination for Breast Cancer    Aug  4 2011  9:08AM     

 

                    Tinea Corporis      Aug  4 2011  9:08AM     

 

                    Depressive Disorder    Sep 23 2011  8:47AM     

 

                    Contact Dermatitis    Sep 23 2011  8:47AM     

 

                    Anemia, Pernicious    Sep 23 2011  8:47AM     

 

                    B12 deficiency      Sep 23 2011  8:47AM     

 

                    B12 deficiency      Sep 27 2011  2:58PM     

 

                    B12 deficiency      Oct 20 2011  2:34PM     

 

                    Flu                 Dec  9 2011  3:16PM     

 

                    B12 deficiency      Dec  9 2011  3:17PM     

 

                    B12 deficiency      2012  4:52PM     

 

                    B12 deficiency      2012 11:10AM     

 

                    B12 deficiency      2012  3:37PM     

 

                    B12 deficiency      May  3 2012  4:10PM     

 

                    B12 deficiency      2012  2:54PM     

 

                    B12 deficiency      2012 11:23AM     

 

                    B12 deficiency      Aug  9 2012  2:08PM     

 

                    B12 deficiency      Sep  6 2012  4:36PM     

 

                    B12 deficiency      Oct 16 2012 10:23AM     

 

                    Flu                 Feb  4   3:11PM     

 

                    Bipolar disorder, unspecified    Feb  2013  2:48PM     

 

                    Anemia, Pernicious    Feb  4   2:48PM     

 

                    B12 deficiency      Feb  4   2:48PM     

 

                    Extrapyramidal abnormal movement disorder    Feb  4   2:48PM     

 

                    B12 deficiency      Apr  3 2013 12:03PM     

 

                    Bipolar disorder, unspecified    May  7 2013  1:31PM     

 

                    Anemia, Pernicious    May  7 2013  1:31PM     

 

                    B12 deficiency      May  7 2013  1:31PM     

 

                    Extrapyramidal abnormal movement disorder    May  7 2013  1:31PM     

 

                    B12 deficiency      2013  3:42PM     

 

                    B12 deficiency      2013  1:31PM     

 

                    Hyperlipidemia      Aug  7 2013 10:37AM     

 

                    Vitamin D Deficiency    Aug  7 2013 10:37AM     

 

                    Bipolar disorder, unspecified    Aug  7 2013 10:37AM     

 

                    Anemia, Pernicious    Aug  7 2013 10:37AM     

 

                    B12 deficiency      Aug  7 2013 10:37AM     

 

                    B12 deficiency      Sep 25 2013 11:15AM     

 

                    B12 deficiency      Dec 11 2013  3:16PM     

 

                    B12 deficiency      Mar  6 2014  1:48PM     

 

                    B12 deficiency      May 21 2014  3:17PM     

 

                    Screening Examination for Breast Cancer    2014  3:23PM     

 

                    Periumbilical abdominal pain    2014  3:23PM     

 

                    B12 deficiency      Jul 10 2014  2:52PM     

 

                    Anemia, Vitamin B12 Deficiency    Aug 13 2014  4:50PM     

 

                    Bipolar disorder    Oct 16 2014 11:13AM     

 

                    Hyperlipidemia      Oct 16 2014 11:13AM     

 

                    Anemia, Pernicious    Oct 16 2014 11:13AM     

 

                    Peritoneal Neoplasm, Malignant    Oct 16 2014 11:13AM     

 

                    Screening breast examination    Oct 16 2014 11:13AM     

 

                    Weight loss         Oct 16 2014 11:13AM     

 

                    Anemia, Pernicious    Mar 23 2015  2:57PM     

 

                    B12 deficiency      Mar 23 2015  2:57PM     

 

                    Need for Prevnar vaccine    Mar 23 2015  2:57PM     

 

                    Bipolar disorder    Mar 23 2015  2:57PM     

 

                    Hyperlipidemia      Mar 23 2015  2:57PM     

 

                    Anemia, Pernicious    Mar 23 2015  2:57PM     

 

                    Peritoneal Neoplasm, Malignant    Mar 23 2015  2:57PM     

 

                    B12 deficiency      May  4 2015  4:48PM     

 

                    Hyperlipidemia      May 13 2015  9:56AM     

 

                    Anemia              May 13 2015  9:56AM     

 

                    Bipolar disorder    May 13 2015  9:56AM     

 

                    Bipolar disorder    May 14 2015  3:27PM     

 

                    Hyperlipidemia      May 14 2015  3:27PM     

 

                    Anemia, Pernicious    May 14 2015  3:27PM     

 

                    Peritoneal Neoplasm, Malignant    May 14 2015  3:27PM     

 

                    B12 deficiency      2015  2:20PM     

 

                    B12 deficiency      2015 11:34AM     

 

                    B12 deficiency      Aug 18 2015  9:06AM     

 

                    Tinea Corporis      Sep 18 2015  8:54AM     

 

                    B12 deficiency      Sep 18 2015  8:54AM     

 

                    B12 deficiency      2015 10:28AM     

 

                    Herpes zoster without complication    Dec  3 2015  9:52AM     

 

                    B12 deficiency      Dec 23 2015 11:21AM     

 

                    B12 deficiency      2016  4:51PM     

 

                    Vitamin B 12 deficiency    Mar 14 2016  5:35PM     

 

                    B12 deficiency      Mar 15 2016 12:14PM     

 

                    B12 deficiency      May  5 2016 11:30AM     

 

                    Edema               May  5 2016 11:30AM     

 

                    Dermatitis          May  5 2016 11:30AM     

 

                    Edema               May 17 2016  8:38AM     

 

                    Shortness of breath    May 17 2016  8:38AM     

 

                    Bilateral edema of lower extremity    2016  2:06PM     

 

                    B12 deficiency      2016  2:06PM     

 

                    B12 deficiency      2016 11:50AM     

 

                    B12 deficiency      2016 11:20AM     

 

                    Diarrhea            Aug  2 2016  3:13PM     

 

                    B12 deficiency      Aug 24 2016 11:10AM     

 

                    Encounter for screening mammogram for breast cancer    Aug 24 2016 11:44AM     

 

                    B12 deficiency      Sep 28 2016  2:35PM     

 

                    B12 deficiency      Dec 15 2016  2:02PM     

 

                    Dysuria             Dec 29 2016 12:14PM     







Payers







           Insurance Name    Company Name    Plan Name    Plan Number    Policy Number    Policy Group

 Number                                 Start Date

 

                    Medicare Part A    Medicare C              [id number]              N/A

 

                          [employer]    [employer]                 [id number]

                                                    

 

                    Medicare Part A    Medicare - Lab/Xray              [id number]              2006

 

                    Medicare Part B    [employer]ic[employer] [place]              [id number]              2006

 

                          [employer]    [employer]                 50 percent

                                                    2009

 

                    Human    [employer]              [id number]              N/A

 

                    Medicare Part A    Medicare Part A              [id number]              N/A

 

                    Medicare Part A    Medicare Part A              [id number]              2006









History of Encounters







                    Visit Date          Visit Type          Provider

 

                    12/15/2016          Nurse visit         Bhupinder Louise DO

 

                    2016           Nurse visit         Bret Forte APRFIORELLA

 

                    2016           Nurse visit         Bhupinder Louise DO

 

                    2016            Office visit        Bhupinder Louise DO

 

                    2016           Nurse visit         Bhupinder Louise DO

 

                    2016           Office visit        Bret Forte APRFIORELLA

 

                    2016            Office visit        Bhupinder Aspen DO

 

                    3/15/2016           Nurse visit         Bhupinder Louise DO

 

                    2016            Nurse visit         Bhupinder Aspen DO

 

                    2015          Nurse visit         Bhupinder Aspen DO

 

                    12/3/2015           Office visit        Bhupinder Louise DO

 

                    2015          Nurse visit         Bhupinder Louise DO

 

                    2015           Office visit        Bhupinder Louise DO

 

                    2015           Nurse visit         Bhupinder Louise DO

 

                    2015            Nurse visit         Bhupinder Louise DO

 

                    2015            Nurse visit         Bhupinder Louise DO

 

                    2015           Office visit        Bhupinder Aspen DO

 

                    2015            Nurse visit         Bhupinder Aspen DO

 

                    3/23/2015           Office visit        Bhupinder Aspen DO

 

                    10/16/2014          Office visit        Bhupinder Aspen DO

 

                    2014           Nurse visit         Radha Weston APRN

 

                    7/10/2014           Nurse visit         Bhupinder Aspen DO

 

                    2014           Office visit        Bhupinder Aspen DO

 

                    2014           Nurse visit         Bhupinder Aspen DO

 

                    3/6/2014            Nurse visit         Bhupinder Aspen DO

 

                    2014            [facility]            EARNEST Lopez MD

 

                    2013          Nurse visit         Bhupinder Aspen DO

 

                    2013           Nurse visit         Bhupinder Aspen DO

 

                    2013            Office visit        Bhupinder Aspen DO

 

                    2013            Nurse visit         Bhupinder Aspen DO

 

                    2013            Nurse visit         Bhupinder Aspen DO

 

                    2013            Office visit        Bhupinder Aspen DO

 

                    4/3/2013            Nurse visit         Bhupinder Aspen DO

 

                    2013            Office visit        Bhupinder Aspen DO

 

                    10/16/2012          Nurse visit         Bhupinder Aspen DO

 

                    2012            Nurse visit         Bhupinder Aspen DO

 

                    2012            Voided              Bhupinder Aspen DO

 

                    2012            Nurse visit         Bhupinder Aspen DO

 

                    2012            Nurse visit         Bhupinder Aspen DO

 

                    2012           Nurse visit         Bhupinder Aspen DO

 

                    5/3/2012            Nurse visit         Bhupinder Aspen DO

 

                    2012           Nurse visit         Bhupinder Aspen DO

 

                    2012           Nurse visit         Bhupinder Aspen DO

 

                    2012           Nurse visit         Bhupinder Aspen DO

 

                    2011           Nurse visit         Bhupinder Aspen DO

 

                    10/20/2011          Nurse visit         Bhupinder Aspen DO

 

                    2011           Office visit        Bhupinder Aspen DO

 

                    2011           Nurse visit         Radha Ontiveros APRN

 

                    2011            Office visit        Bhupinder Aspen DO

 

                    2011           Nurse visit         Bhupinder Aspen DO

 

                    2011            Office visit        Bhupinder Aspen DO

 

                    2011           Office visit        Bhupinder Aspen DO

 

                    5/10/2011           Office visit        Bhupinder Aspen DO

 

                    2011           Office visit        Bhupinder Aspen DO

 

                    4/15/2011           Office visit        Devin Angel DO

 

                    2011           Office visit        Devin Angel DO

 

                    10/15/2010          Office visit        Devin Angel DO

 

                    2010           Office visit        Devin Angel DO

 

                    2010            Office visit        Devin Angel DO

 

                    2010           Office visit        Devin Angel DO

 

                    2010            Office visit        Devin Angel DO

 

                    3/8/2010            Office visit        Devin Masterson MD

 

                    2010            Surgery             Devin Masterson MD

 

                    2010            Office visit        Devin Angel DO

 

                    2010           Surgery             Devin Masterson MD

 

                    2010           Hospital            Devin Masterson MD

 

                    2010           [facility]            Devin Masterson MD

 

                    10/22/2009          Office visit        Devin Angel DO

## 2019-06-26 NOTE — PROGRESS NOTE-POST OPERATIVE
Post-Operative Progess Note


Surgeon (s)/Assistant (s)


Surgeon


MOSES BECERRA DO


Assistant:  none





Pre-Operative Diagnosis


Venous Insufficiency, Malfunctioning port, stoma mass





Post-Operative Diagnosis





same





Procedure & Operative Findings


Date of Procedure


6/26/19


Procedure Performed/Findings


Noé-cath removal


Excisional Polypectomy


Anesthesia Type


IV sedation by CRNA





Estimated Blood Loss


Estimated blood loss (mL):  scant





Specimens/Packing


Specimens Removed


stomal mass, probable polyp











MOSES BECERRA DO               Jun 26, 2019 08:39

## 2019-06-26 NOTE — XMS REPORT
MU2 Ambulatory Summary

                             Created on: 2017



Pauline Gan

External Reference #: [id number]

: 1950

Sex: Female



Demographics







                          Address                   [address]

[place]  [id number]

 

                          Home Phone                [phone]

 

                          Preferred Language        English

 

                          Marital Status            Legally 

 

                          [employer]ar[employer] Affiliation     Unknown

 

                          Race                      White

 

                          Ethnic Group              Not  or 





Author







                          Author                    Bhupinder Louise

 

                          [facility]

 

                          Address                   [address]

[place]  [phone]



 

                          Phone                     [phone]







Care Team Providers







                    Care Team Member Name    Role                Phone

 

                    Bhupinder Louise    PCP                 Unavailable

 

                    Bhupinder Louise    PreferredProvider    Unavailable







Allergies and Adverse Reactions







                    Name                Reaction            Notes

 

                    NO KNOWN DRUG ALLERGIES                         







Plan of Treatment







             Planned Activity    Comments     Planned Date    Planned Time    Plan/Goal

 

             Injection,Subcutaneous/Intramuscul, [facility]                 2017    12:00 AM      







Medications







                                        Active 

 

             Name         Start Date    Estimated Completion Date    SIG          Comments

 

                Latuda 20 mg oral tablet                                    take 1 tablet (20 mg) by oral route once daily with

 food (at least 350 calories)            

 

             pravastatin 40 mg oral tablet    3/30/2015                 TAKE 1 TABLET BY MOUTH DAILY     

 

                Namenda XR 28 mg oral capsule,sprinkle,ER 24hr    2015                       take 1 capsule (28

 mg) by oral route once daily            

 

                Namenda XR 28 mg oral capsule,sprinkle,ER 24hr    2016                       take 1 capsule (28

 mg) by oral route once daily            

 

                potassium chloride 10 mEq oral tablet extended release    2016                       take 1 tablet

 (10 meq) by oral route once daily       

 

             pravastatin 40 mg oral tablet    2016                 TAKE 1 TABLET BY MOUTH DAILY     

 

                Vitamin B-12 1,000 mcg/mL injection solution    2016                       inject 1 milliliter 

(1,000 mcg) by intramuscular route once a month     

 

                potassium chloride 10 mEq oral tablet extended release    2016                      take 1 tablet

 (10 meq) by oral route once daily       

 

                Namenda XR 28 mg oral capsule,sprinkle,ER 24hr    2016                      TAKE 1 CAPSULE BY

 MOUTH EVERY DAY                         

 

                furosemide 40 mg oral tablet    2016                      take 1 tablet (40 mg) by oral route

 once daily                              

 

                mirtazapine 45 mg oral tablet                                    take 1 tablet (45 mg) by oral route once daily

 before bedtime                          

 

             Fish Oil 300-1,000 mg oral capsule                              take 1 capsule by oral route daily     

 

             Vitamin D3 1,000 unit oral tablet                              take 1 tablet by oral route daily     

 

                Calcium 600 600 mg calcium (1,500 mg) oral tablet                                    take 1 tablet by oral route

 daily                                   

 

                Aspirin Low Dose 81 mg oral tablet,delayed release (DR/EC)                                    take 1 tablet 

(81 mg) by oral route once daily         

 

                metoclopramide HCl 10 mg oral tablet    2017                       take 1 tablet by oral route 

2 times a day for 50 days                









                                         

 

             Name         Start Date    Expiration Date    SIG          Comments

 

             Reglan 10mg    3/29/2010    2010    one ac and hs     

 

                Keflex 500 mg oral capsule    2010       10/1/2010       take 1 capsule (500 mg) by oral

 route every 6 hours for 10 days         

 

                Bactrim -160 mg oral tablet    2011       take 1 tablet by oral route

 every 12 hours for 7 days               

 

                triamcinolone acetonide 0.1 % topical cream    2011      apply a thin

 layer to the affected area(s) by topical route 2 times per day     

 

                sertraline 100 mg oral tablet    4/10/2012       5/10/2012       take 1.5 tablets by oral route

 daily for 30 days                       

 

                ergocalciferol (vitamin D2) 50,000 unit oral capsule    4/15/2013       2013       TAKE

 ONE CAPSULE BY MOUTH ONCE A WEEK        

 

                CYANOCOBALAM 1000MCGINJ 1000 milliliter    2013       INJECT 1ML INTRAMUSCULAR

 ONCE A MONTH                            

 

                pravastatin 40 mg oral tablet    3/25/2014       3/20/2015       TAKE ONE TABLET BY MOUTH EVERY

 DAY                                     

 

                          Zostavax (PF) 19,400 unit/0.65 mL subcutaneous suspension for reconstitution    3/23/2015

                    3/24/2015           inject 0.65 milliliter by subcutaneous route once     

 

                famciclovir 500 mg oral tablet    12/3/2015       12/10/2015      take 1 tablet (500 mg) by

 oral route every 8 hours for 7 days     

 

                furosemide 40 mg oral tablet    2016      take 1 tablet (40 mg) by oral

 route once daily                        

 

                Cipro 500 mg oral tablet    2016       take 1 tablet (500 mg) by oral route

 2 times per day for 5 days              

 

                Bactrim -160 mg oral tablet    2016        take 1 tablet by oral route

 every 12 hours for 7 days               

 

                metoclopramide HCl 10 mg oral tablet    2017       take 1 tablet by oral

 route 2 times a day for 50 days         

 

                Macrobid 100 mg oral capsule    2017       take 1 capsule (100 mg) by oral

 route 2 times per day with food for 7 days     

 

                Augmentin 875-125 mg oral tablet    2017       take 1 tablet by oral route

 every 12 hours for 7 days               

 

                amoxicillin 500 mg oral tablet    2017       take 1 tablet (500 mg) by oral

 route every 12 hours for 7 days         

 

                cefuroxime axetil 500 mg oral tablet    2017       take 1 tablet (500 mg)

 by oral route 2 times per day for 7 days     









                                        Discontinued 

 

             Name         Start Date    Discontinued Date    SIG          Comments

 

                Tylenol 325 mg oral tablet                    2013        take 1 - 2 tablets (325 -650 mg) by oral

 route every 4-6 hours as needed         

 

                Calcium 600 + D(3) 600 mg(1,500mg) -400 unit oral tablet                    2011       take 1 tablet

 by oral route 2 times a day            no longer taking

 

                Vitamin B-12 1,000 mcg oral tablet extended release    2010       take 1

 tablet by oral route daily             no longer taking

 

                Antifungal (clotrimazole) 1 % topical cream    2010       apply to the 

affected and surrounding areas of skin by topical route 2 times per day morning 
and evening                              

 

                sertraline 100 mg oral tablet    5/10/2011       2011       take 2 tablets (200 mg) by 

oral route once daily                   discontinued by Dr. Serrano

 

                mirtazapine 15 mg oral tablet                    2011        take 1 tablet (15 mg) by oral route 

once daily before bedtime               Dr. Serrano

 

                mirtazapine 15 mg oral tablet                    2011        take 1 tablet (15 mg) by oral route 

once daily before bedtime               dc'd by Dr. Serrano

 

                Pristiq 50 mg oral tablet extended release 24 hr                    2013        take 1 tablet (50

 mg) by oral route once daily           Dr. Serrano

 

                Pristiq 50 mg oral tablet extended release 24 hr                    2013        take 1 tablet (50

 mg) by oral route once daily           dose updated

 

                Vitamin B-12 1,000 mcg/mL injection solution    2011        inject 1 milliliter

 (1,000 mcg) by intramuscular route once a month    on list already

 

                    syringe with needle 1 mL 25 gauge x 1" miscellaneous syringe    2011

                          use for injection once a month     

 

                clotrimazole 1 % topical cream    2011        apply to the affected and surrounding

 areas of skin by topical route 2 times per day in the morning and evening     

 

                Vitamin D2 50,000 unit oral capsule    2011        take 1 capsule (50,000

 unit) by oral route once weekly        generic on list

 

                Pravachol 40 mg oral tablet    2012        take 1 tablet (40 mg) by oral 

route once daily for 90 days            generic on list

 

                lithium carbonate 300 mg oral capsule    2012        take 1 capsule by oral

 route daily                            dose updated

 

                Pristiq 100 mg oral tablet extended release 24 hr                    4/10/2012       take 1 and 1/2 

tablet (150 mg) by oral route once daily    Mental Health provider

 

                Pristiq 100 mg oral tablet extended release 24 hr                    4/10/2012       take 1 and 1/2 

tablet (150 mg) by oral route once daily    Discontinued by Dr Efrain [facility] at [facility]

 

                hydroxyzine HCl 50 mg oral tablet    10/16/2014      2015       take 1 tablet (50 mg) 

by oral route at bedtime                 

 

                lithium carbonate 300 mg oral capsule    2015       take 1 capsule (300

 mg) by oral route 2 for 30 days         

 

                fluconazole 100 mg oral tablet    2015       12/3/2015       take 1 tablet (100 mg) by 

oral route once a week                   

 

                ketoconazole 2 % topical cream    2015       12/3/2015       apply to the affected area(s)

 by topical route 2 times per day        

 

                prednisone 10 mg oral tablet    12/3/2015       2016        take 2 tablets (20 mg) by oral

 route once daily for 4 days 1 tablet daily for 4 days 0.5 tablet daily for 4 
days                                     

 

                triamcinolone acetonide 0.1 % topical cream    2016       apply a thin layer

 to the affected area(s) by topical route 2 times per day     

 

                Cipro 500 mg oral tablet    1/15/2017       2017       take 1 tablet (500 mg) by oral route

 every 12 hours for 10 days              







Problem List







                    Description         Status              Onset

 

                    Artificial opening status; colostomy    Active               

 

                    Bipolar disorder, unspecified    Active               

 

                    Hyperlipidemia      Active               

 

                    Peritoneal Neoplasm, Malignant    Active               

 

                    Anemia, Pernicious    Active               

 

                    Arthritis unspecified    Active               

 

                    B12 deficiency      Active               







Vital Signs







      Date    Time    BP-Sys(mm[Hg]    BP-Lynn(mm[Hg])    HR(bpm)    RR(rpm)    Temp    WT    HT    HC    BMI

                    BSA                 BMI Percentile      O2 Sat(%)

 

        2017    3:05:00 PM    134 mmHg    70 mmHg    70 bpm    20 rpm    97.4 F    181 lbs    69 in

                          26.73 kg/m2    2.00 m2                   98 %

 

        2017    11:07:00 AM    124 mmHg    64 mmHg    62 bpm    17 rpm    98.2 F    181.2 lbs    69

 in                       26.7583 kg/m    2.0003 m                 98 %

 

        1/15/2017    3:34:00 PM    148 mmHg    89 mmHg    69 bpm    20 rpm    98.2 F    179 lbs    69 in

                          26.43 kg/m2    1.99 m2                   98 %

 

       2017    1:51:00 PM    160 mmHg    90 mmHg    100 bpm    20 rpm    96.5 F    179 lbs             

                                                                98 %

 

       2016    3:11:00 PM    134 mmHg    76 mmHg    80 bpm    20 rpm    98 F    163 lbs    69 in     

                24.07 kg/m2     1.90 m2                         98 %

 

        2016    2:04:00 PM    142 mmHg    86 mmHg    68 bpm    16 rpm    98.5 F    166 lbs    63 in

                          29.4053 kg/m    1.8295 m                 100 %

 

        2016    11:27:00 AM    148 mmHg    78 mmHg    90 bpm    20 rpm    98.2 F    153 lbs    69 in

                          22.59 kg/m2    1.84 m2                   96 %

 

        12/3/2015    9:50:00 AM    132 mmHg    70 mmHg    62 bpm    16 rpm    97.9 F    145 lbs    69 in

                          21.4125 kg/m    1.7894 m                 100 %

 

        2015    8:52:00 AM    132 mmHg    68 mmHg    52 bpm    20 rpm    97.8 F    141 lbs    69 in

                          20.82 kg/m2    1.76 m2                   100 %

 

        2015    3:25:00 PM    120 mmHg    62 mmHg    72 bpm    16 rpm    98.1 F    136 lbs    69 in

                          20.0835 kg/m    1.733 m                 98 %

 

       3/23/2015    2:55:00 PM    130 mmHg    76 mmHg    68 bpm    18 rpm    97 F    140 lbs    69 in    

                20.67 kg/m2     1.76 m2                         98 %

 

        10/16/2014    11:11:00 AM    120 mmHg    66 mmHg    77 bpm    20 rpm    98 F    130 lbs    69 in

                          19.1974 kg/m    1.6943 m                 100 %

 

        2014    3:21:00 PM    130 mmHg    66 mmHg    63 bpm    18 rpm    97.2 F    160 lbs    69 in

                          23.6276 kg/m    1.88 m2                   99 %

 

        2013    10:35:00 AM    132 mmHg    70 mmHg    66 bpm    20 rpm    98.1 F    157 lbs    69 in

                          23.18 kg/m2    1.862 m                  

 

        2013    1:29:00 PM    132 mmHg    70 mmHg    76 bpm    18 rpm    98.2 F    166 lbs    69 in 

                          24.5137 kg/m    1.91 m2                    

 

       2013    2:46:00 PM    128 mmHg    70 mmHg    76 bpm    16 rpm    98 F    160 lbs    69 in     

                23.63 kg/m2     1.8797 m                       

 

        2011    8:49:00 AM    128 mmHg    78 mmHg    70 bpm    18 rpm    97.9 F    164 lbs    69 in

                          24.2183 kg/m    1.90 m2                    

 

     2011    1:31:00 PM    132 mmHg    68 mmHg    84 bpm         97 F    167 lbs                        

                                         

 

        2011    9:09:00 AM    128 mmHg    70 mmHg    72 bpm    18 rpm    98.2 F    163 lbs    64 in 

                          27.9786 kg/m    1.83 m2                    

 

       2011    10:01:00 AM    132 mmHg    70 mmHg    72 bpm    18 rpm    98.2 F    154 lbs             

                                                                 

 

       2011    2:47:00 PM    128 mmHg    70 mmHg    72 bpm    18 rpm    97.8 F    156 lbs             

                                                                 

 

       5/10/2011    3:16:00 PM    144 mmHg    80 mmHg    72 bpm    18 rpm    98.2 F    158 lbs             

                                                                 

 

        2011    10:11:00 AM    132 mmHg    70 mmHg    70 bpm    18 rpm    98.2 F    168 lbs    69 in

                          24.809 kg/m    1.93 m2                    

 

        4/15/2011    10:52:00 AM    110 mmHg    60 mmHg    75 bpm    16 rpm    97.5 F    172.375 lbs    

69 in                     25.46 kg/m2    1.951 m                 100 %

 

        2011    11:43:00 AM    120 mmHg    82 mmHg    75 bpm    16 rpm    97.2 F    178.5 lbs    69

 in                       26.3596 kg/m    1.99 m2                   100 %

 

        10/15/2010    1:32:00 PM    120 mmHg    70 mmHg    80 bpm    18 rpm    96.6 F    177 lbs    69 in

                          26.14 kg/m2    1.977 m                 100 %

 

        2010    3:50:00 PM    168 mmHg    100 mmHg    82 bpm    18 rpm    97.8 F    177.5 lbs    69

 in                       26.2119 kg/m    1.98 m2                   97 %

 

        2010    1:21:00 PM    140 mmHg    80 mmHg    59 bpm    16 rpm    97.6 F    173.25 lbs    69 

in                        25.58 kg/m2    1.956 m                 100 %

 

        2010    3:02:00 PM    140 mmHg    80 mmHg    61 bpm    16 rpm    97.6 F    173.125 lbs    69

 in                       25.5658 kg/m    1.96 m2                   99 %

 

        2010    1:23:00 PM    130 mmHg    80 mmHg    66 bpm    16 rpm    96.8 F    173 lbs    69 in 

                          25.55 kg/m2    1.9546 m                 100 %

 

        2010    12:58:00 PM    130 mmHg    88 mmHg    75 bpm    16 rpm    98.4 F    172.25 lbs    69

 in                       25.4366 kg/m    1.95 m2                   100 %







Social History







                    Name                Description         Comments

 

                    denies alcohol use                         

 

                    denies smoking                           

 

                    Denies illicit substance abuse                         

 

                    retired                                 direct care

 

                    Single                                   

 

                    Exercises regularly                         

 

                    Attended some college                         

 

                    Tobacco             Never smoker         







History of Procedures







                    Date Ordered        Description         Order Status

 

                    2010 12:00 AM    COMPREHEN METABOLIC PANEL    Reviewed

 

                    2010 12:00 AM    COMPLETE CBC W/AUTO DIFF WBC    Reviewed

 

                    2010 12:00 AM    LIPID PANEL         Reviewed

 

                          2015 12:00 AM        B12 Injection, Up to 1000 Mcg NDC#8401-0780-48 [facility] 

                                        Reviewed

 

                    2011 12:00 AM    MAMMOGRAM SCREENING    Reviewed

 

                    2011 12:00 AM    CYTOPATH C/V THIN LAYER    Reviewed

 

                    2011 12:00 AM    B12 Injection 1 cc NDC#[address]-40    Reviewed

 

                    2015 12:00 AM    THER/PROPH/DIAG INJ SC/IM    Reviewed

 

                    2015 12:00 AM    B12 Injection, Up to 1000 Mcg NDC#5438-1338-77    Reviewed

 

                    2011 12:00 AM    THER/PROPH/DIAG INJ SC/IM    Reviewed

 

                    2011 12:00 AM    B12 Injection([facility]) Ndc#3686-3451-77-    Reviewed

 

                    2015 12:00 AM    THER/PROPH/DIAG INJ SC/IM    Reviewed

 

                    2015 12:00 AM    B12 Injection, Up to 1000 Mcg NDC#5501-2000-93    Reviewed

 

                    10/20/2011 12:00 AM    THER/PROPH/DIAG INJ SC/IM    Reviewed

 

                    10/20/2011 12:00 AM    B12 Injection([facility]) Ndc#3187-5989-69-    Reviewed

 

                    2016 12:00 AM    THER/PROPH/DIAG INJ SC/IM    Reviewed

 

                    2016 12:00 AM    B12 Injection, Up to 1000 Mcg NDC#1198-8429-36    Reviewed

 

                    3/14/2016 12:00 AM    VITAMIN B-12        Reviewed

 

                    3/15/2016 12:00 AM    THER/PROPH/DIAG INJ SC/IM    Reviewed

 

                    3/15/2016 12:00 AM    B12 Injection, Up to 1000 Mcg NDC#4182-4387-31    Reviewed

 

                    2011 12:00 AM    ***Immunization administration, Medicare flu    Reviewed

 

                    2011 12:00 AM    Fluzone ** MEDICARE Only **    Reviewed

 

                    2011 12:00 AM    THER/PROPH/DIAG INJ SC/IM    Reviewed

 

                    2011 12:00 AM    B12 Injection ([employer] [facility]) Ndc#4742-0928-10    Reviewed

 

                    2016 12:00 AM    B12 Injection, Up to 1000 Mcg NDC#7232-4736-79 [facility]    

Reviewed

 

                    2016 12:00 AM    TTE W/DOPPLER COMPLETE    Reviewed

 

                    2016 12:00 AM    EXTREMITY STUDY     Reviewed

 

                          2016 12:00 AM        B12 Injection, Up to 1000 Mcg NDC#1542-0662-61 [facility] Medicare 

                                        Reviewed

 

                    2016 12:00 AM    THER/PROPH/DIAG INJ SC/IM    Reviewed

 

                    2016 12:00 AM    B12 Injection, Up to 1000 Mcg NDC#8709-7949-82    Reviewed

 

                    2016 12:00 AM    THER/PROPH/DIAG INJ SC/IM    Reviewed

 

                    2012 12:00 AM    B12 Injection([place]) Ndc#1673-1790-35-    Reviewed

 

                    2016 12:00 AM    B12 Injection, Up to 1000 Mcg NDC#5918-8436-16    Reviewed

 

                    2016 12:00 AM    THER/PROPH/DIAG INJ SC/IM    Reviewed

 

                    2012 12:00 AM    THER/PROPH/DIAG INJ SC/IM    Reviewed

 

                    2012 12:00 AM    B12 Injection ([employer]) Ndc#6850-7533-52    Reviewed

 

                    2016 12:00 AM    THER/PROPH/DIAG INJ SC/IM    Reviewed

 

                    2016 12:00 AM    B12 Injection, Up to 1000 Mcg NDC#7600-9423-22    Reviewed

 

                    2016 12:00 AM    B12 Injection, Up to 1000 Mcg NDC#3730-4085-48    Reviewed

 

                    2016 12:00 AM    THER/PROPH/DIAG INJ SC/IM    Reviewed

 

                    2012 12:00 AM    THER/PROPH/DIAG INJ SC/IM    Reviewed

 

                    2012 12:00 AM    B12 Injection([place]) Ndc#6819-4478-22-    Reviewed

 

                    12/15/2016 12:00 AM    B12 Injection, Up to 1000 Mcg NDC#2797-8555-21    Reviewed

 

                    12/15/2016 12:00 AM    THER/PROPH/DIAG INJ SC/IM    Reviewed

 

                    2016 12:00 AM    URNLS DIP STICK/TABLET RGNT AUTO W/O MICROSCOPY    Returned

 

                    1/3/2017 12:00 AM    URNLS DIP STICK/TABLET RGNT AUTO W/O MICROSCOPY    Returned

 

                    2017 12:00 AM    URINE CULTURE/COLONY COUNT    Returned

 

                    2017 12:00 AM    Rocephin 1 gram Injection, [facility]    Reviewed

 

                    2017 12:00 AM    THERAPEUTIC PROPHYLACTIC/DX INJECTION SUBQ/IM    Reviewed

 

                    2017 12:00 AM    B12 1000mcg Injection, [facility]    Reviewed

 

                    5/3/2012 12:00 AM    THER/PROPH/DIAG INJ SC/IM    Reviewed

 

                    5/3/2012 12:00 AM    B12 Injection([facility]) Ndc#5930-0388-69-    Reviewed

 

                    2017 12:00 AM    THERAPEUTIC PROPHYLACTIC/DX INJECTION SUBQ/IM    Reviewed

 

                    2017 12:00 AM    B12 1000mcg Injection, [facility]    Reviewed

 

                    2017 12:00 AM    MRI BRAIN STEM W/O & W/DYE    Reviewed

 

                    2017 12:00 AM    VITAMIN B-12        Reviewed

 

                    2017 12:00 AM    Speech Therapy Consult    Reviewed

 

                    2017 12:00 AM    ASSAY OF CREATININE    Reviewed

 

                    2012 12:00 AM    IMMUNOTHERAPY INJECTIONS    Reviewed

 

                    2012 12:00 AM    B12 Injection([facility]) Ndc#6531-1134-58-    Reviewed

 

                    2017 12:00 AM    URINALYSIS AUTO W/SCOPE    Reviewed

 

                    2012 12:00 AM    THER/PROPH/DIAG INJ SC/IM    Reviewed

 

                    2012 12:00 AM    B12 Injection, Up to 1000 Mcg NDC#2417-1347-45    Reviewed

 

                    2017 12:00 AM    URINALYSIS AUTO W/SCOPE    Returned

 

                    2012 12:00 AM    THER/PROPH/DIAG INJ SC/IM    Reviewed

 

                    2012 12:00 AM    B12 Injection, Up to 1000 Mcg NDC#8407-9727-71    Reviewed

 

                    2012 12:00 AM    THER/PROPH/DIAG INJ SC/IM    Reviewed

 

                    2012 12:00 AM    B12 Injection, Up to 1000 Mcg NDC#0942-3736-07    Reviewed

 

                    10/16/2012 12:00 AM    THER/PROPH/DIAG INJ SC/IM    Reviewed

 

                    10/16/2012 12:00 AM    B12 Injection, Up to 1000 Mcg NDC#1635-4669-48    Reviewed

 

                    2010 12:00 AM    COMPREHEN METABOLIC PANEL    Reviewed

 

                    2010 12:00 AM    COMPLETE CBC W/AUTO DIFF WBC    Reviewed

 

                    2010 12:00 AM    LIPID PANEL         Reviewed

 

                    2013 12:00 AM    Flu Injection 3 Years And Above NDC# [address]-04  [facility]    Reviewed



 

                    2013 12:00 AM    COMPLETE CBC W/AUTO DIFF WBC    Reviewed

 

                    2013 12:00 AM    ASSAY OF LITHIUM    Reviewed

 

                    2013 12:00 AM    METABOLIC PANEL TOTAL CA    Reviewed

 

                    4/3/2013 12:00 AM    THER/PROPH/DIAG INJ SC/IM    Reviewed

 

                    4/3/2013 12:00 AM    B12 Injection, Up to 1000 Mcg NDC#1634-6578-79    Reviewed

 

                    2013 12:00 AM    THER/PROPH/DIAG INJ SC/IM    Reviewed

 

                    2013 12:00 AM    B12 Injection, Up to 1000 Mcg NDC#7468-3262-37    Reviewed

 

                    2013 12:00 AM    THER/PROPH/DIAG INJ SC/IM    Reviewed

 

                    2013 12:00 AM    B12 Injection, Up to 1000 Mcg NDC#2039-4336-64    Reviewed

 

                    2013 12:00 AM    LIPID PANEL         Reviewed

 

                    2013 12:00 AM    VITAMIN D 25 HYDROXY    Reviewed

 

                    2013 12:00 AM    THER/PROPH/DIAG INJ SC/IM    Reviewed

 

                    2013 12:00 AM    B12 Injection, Up to 1000 Mcg NDC#1926-6745-33    Reviewed

 

                    2013 12:00 AM    THER/PROPH/DIAG INJ SC/IM    Reviewed

 

                    3/6/2014 12:00 AM    THER/PROPH/DIAG INJ SC/IM    Reviewed

 

                    2014 12:00 AM    THER/PROPH/DIAG INJ SC/IM    Reviewed

 

                    2014 12:00 AM    B12 Injection, Up to 1000 Mcg NDC#0435-9645-31    Reviewed

 

                    2010 12:00 AM    SKIN FUNGI CULTURE    Reviewed

 

                    10/9/2010 12:00 AM    COMPREHEN METABOLIC PANEL    Reviewed

 

                    10/9/2010 12:00 AM    LIPID PANEL         Reviewed

 

                    2010 12:00 AM    THER/PROPH/DIAG INJ SC/IM    Reviewed

 

                    2010 12:00 AM    B12 Injection Ndc#[address]-00 ([place])    Reviewed

 

                    2010 12:00 AM    THER/PROPH/DIAG INJ SC/IM    Reviewed

 

                    2010 12:00 AM    Kenalog 40 Mg Im-Ndc#[address]-47 ([place])    Reviewed

 

                    10/15/2010 12:00 AM    FLU VACCINE 3 YRS & > IM    Reviewed

 

                    10/15/2010 12:00 AM    Admin.Of M/C Cov.Vaccine-Flu Vacc.    Reviewed

 

                    1/15/2011 12:00 AM    COMPLETE CBC W/AUTO DIFF WBC    Reviewed

 

                    1/15/2011 12:00 AM    COMPREHEN METABOLIC PANEL    Reviewed

 

                    1/15/2011 12:00 AM    LIPID PANEL         Reviewed

 

                    2014 12:00 AM    MAMMOGRAM SCREENING    Reviewed

 

                    2014 12:00 AM    Screening mammography, bilateral    Reviewed

 

                    7/10/2014 12:00 AM    THER/PROPH/DIAG INJ SC/IM    Reviewed

 

                    7/10/2014 12:00 AM    B12 Injection, Up to 1000 Mcg NDC#2657-4205-97    Reviewed

 

                    2011 12:00 AM    COMPLETE CBC W/AUTO DIFF WBC    Reviewed

 

                    2011 12:00 AM    COMPREHEN METABOLIC PANEL    Reviewed

 

                    2011 12:00 AM    LIPID PANEL         Reviewed

 

                    2014 12:00 AM    B12 Injection, Up to 1000 Mcg NDC#9718-5136-84    Reviewed

 

                    10/19/2014 12:00 AM    MAMMOGRAM SCREENING    Reviewed

 

                    10/19/2014 12:00 AM    Screening mammography, bilateral    Reviewed

 

                    10/16/2014 12:00 AM    B12 Injection, Up to 1000 Mcg NDC#0488-5649-39    Reviewed

 

                    10/16/2014 12:00 AM    COMPLETE CBC W/AUTO DIFF WBC    Reviewed

 

                    10/16/2014 12:00 AM    COMPREHEN METABOLIC PANEL    Reviewed

 

                    10/16/2014 12:00 AM    IMMUNOASSAY TUMOR     Reviewed

 

                    10/16/2014 12:00 AM    LIPID PANEL         Reviewed

 

                    10/16/2014 12:00 AM    ASSAY OF LITHIUM    Reviewed

 

                    10/16/2014 12:00 AM    MAMMOGRAM SCREENING    Reviewed

 

                    2011 12:00 AM    ASSAY OF PARATHORMONE    Reviewed

 

                    2011 12:00 AM    VITAMIN D 25 HYDROXY    Reviewed

 

                    2011 12:00 AM    ASSAY OF LITHIUM    Reviewed

 

                    2011 12:00 AM    METABOLIC PANEL TOTAL CA    Reviewed

 

                    2011 12:00 AM    CT HEAD/BRAIN W/O & W/DYE    Reviewed

 

                    3/23/2015 12:00 AM    PNEUMOCOCCAL VACC 13 GLENDY IM    Reviewed

 

                    3/23/2015 12:00 AM    Vitamin B12 injection    Reviewed

 

                    2011 12:00 AM    ASSAY OF LITHIUM    Reviewed

 

                    2011 12:00 AM    B12 Injection Ndc#[address]-65  [place]    Reviewed

 

                    2015 12:00 AM    THER/PROPH/DIAG INJ SC/IM    Reviewed

 

                    2015 12:00 AM    B12 Injection, Up to 1000 Mcg NDC#8226-4371-12    Reviewed

 

                    2015 12:00 AM    COMPLETE CBC W/AUTO DIFF WBC    Reviewed

 

                    2015 12:00 AM    COMPREHEN METABOLIC PANEL    Reviewed

 

                    2015 12:00 AM    LIPID PANEL         Reviewed

 

                    2015 12:00 AM    ASSAY OF LITHIUM    Reviewed

 

                    2011 12:00 AM    VIT D 1 25-DIHYDROXY    Reviewed

 

                    2011 12:00 AM    VITAMIN B-12        Reviewed

 

                    2015 12:00 AM    B12 Injection, Up to 1000 Mcg NDC#4909-9893-93    Reviewed

 

                    2015 12:00 AM    THER/PROPH/DIAG INJ SC/IM    Reviewed

 

                    2015 12:00 AM    B12 Injection, Up to 1000 Mcg NDC#3678-0386-30    Reviewed

 

                    2011 12:00 AM    THER/PROPH/DIAG INJ SC/IM    Reviewed

 

                    2011 12:00 AM    B12 Injection ([employer]) Ndc#3673-1708-27    Reviewed

 

                    2015 12:00 AM    THER/PROPH/DIAG INJ SC/IM    Reviewed

 

                    2015 12:00 AM    B12 Injection, Up to 1000 Mcg NDC#3401-5214-50    Reviewed







Results Summary







                          Data and Description      Results

 

                          2004 12:00 AM        Colonoscopy-Women and Men over 50 Normal 

 

                          2008 12:00 AM         Pap Smear Declined 

 

                          10/7/2009 12:00 AM        Cholest Cry Stone Ql .0 %LDLc SerPl-mCnc 123.0 mg/dLHDLc

 SerPl-mCnc 34.0 mg/dLTrigl SerPl-mCnc 190.0 mg/dLGlucose SerPl-mCnc 78.0 mg/dL

 

                          2009 12:00 AM        Mammogram -Women over 40 Normal HIV1+2 Ab Ser Ql no risk 

 

                          2010 8:47 AM         Dexa Bone Scan Refused Aspirin reccommended Contraindication 



 

                          2010 8:48 AM         Depression Done 

 

                          2010 12:00 AM         Foot Exam-Diabetic Done 

 

                          2010 12:00 AM         Cholest Cry Stone Ql .0 %LDLc SerPl-mCnc 126.0 mg/dLGlucose

 SerPl-mCnc 102.0 mg/dL

 

                          2010 8:45 AM          TRIGLYCERIDES 122.0 mg/dLCHOLESTEROL 186.0 mg/dLHDL 36.0 mg/dLTOT

 CHOL/HDL 5.2 LDL (CALC) 126.0 mg/dLGLUCOSE 102.0 mg/dLSODIUM 143.0 
mmol/LPOTASSIUM 3.70 mmol/LCHLORIDE 111.0 mmol/LCO2 23.0 mmol/LBUN 10.0 
mg/dLCREATININE 0.80 mg/dLSGOT/AST 12.0 IU/LSGPT/ALT 11.0 IU/LALK PHOS 65.0 
IU/LTOTAL PROTEIN 7.20 g/dLALBUMIN 3.90 g/dLTOTAL BILI 0.50 mg/dLCALCIUM 10.20 
mg/dLAGE 59 GFR NonAA 73 GFR AA 88 eGFR >60 mL/min/1.73 m2eGFR AA* >60 WBC 5.7 
RBC 3.26 HGB 10.60 g/dLHCT 31.70 %MCV 97.0 fLMCH 32.50 pgMCHC 33.40 g/dLRDW SD 
47 RDW CV 13.30 %MPV 9.70 fLPLT 287 NRBC# 0.00 NRBC% 0.0 %NEUT 62.90 %%LYMP 
21.80 %%MONO 9.90 %%EOS 5.0 %%BASO 0.40 %#NEUT 3.56 #LYMP 1.23 #MONO 0.56 #EOS 
0.28 #BASO 0.02 MANUAL DIFF NOT IND 

 

                          2010 12:00 AM        Glucose SerPl-mCnc 96.0 mg/dLCholest Cry Stone Ql .0 %LDLc

 SerPl-mCnc 146.0 mg/dL

 

                          2010 8:26 AM         TRIGLYCERIDES 106.0 mg/dLCHOLESTEROL 199.0 mg/dLHDL 32.0 mg/dLTOT

 CHOL/HDL 6.2 LDL (CALC) 146.0 mg/dLGLUCOSE 96.0 mg/dLSODIUM 143.0 
mmol/LPOTASSIUM 4.0 mmol/LCHLORIDE 113.0 mmol/LCO2 24.0 mmol/LBUN 13.0 
mg/dLCREATININE 1.0 mg/dLSGOT/AST 11.0 IU/LSGPT/ALT 6.0 IU/LALK PHOS 56.0 
IU/LTOTAL PROTEIN 6.60 g/dLALBUMIN 3.80 g/dLTOTAL BILI 0.50 mg/dLCALCIUM 9.30 
mg/dLAGE 59 GFR NonAA 57 GFR AA 69 eGFR 57 eGFR AA* >60 

 

                          10/6/2010 12:00 AM        Cholest Cry Stone Ql .0 %LDLc SerPl-mCnc 111.0 mg/dLGlucose

 SerPl-mCnc 81.0 mg/dL

 

                          10/6/2010 2:45 PM         TRIGLYCERIDES 123.0 mg/dLCHOLESTEROL 178.0 mg/dLHDL 42.0 mg/dLTOT

 CHOL/HDL 4.2 LDL (CALC) 111.0 mg/dLGLUCOSE 81.0 mg/dLSODIUM 139.0 
mmol/LPOTASSIUM 4.10 mmol/LCHLORIDE 106.0 mmol/LCO2 24.0 mmol/LBUN 13.0 
mg/dLCREATININE 0.90 mg/dLSGOT/AST 13.0 IU/LSGPT/ALT 11.0 IU/LALK PHOS 61.0 
IU/LTOTAL PROTEIN 7.10 g/dLALBUMIN 3.90 g/dLTOTAL BILI 0.30 mg/dLCALCIUM 9.30 
mg/dLAGE 60 GFR NonAA 64 GFR AA 78 eGFR >60 mL/min/1.73 m2eGFR AA* >60 WBC 6.9 
RBC 3.59 HGB 11.50 g/dLHCT 35.30 %MCV 98.0 fLMCH 32.0 pgMCHC 32.60 g/dLRDW SD 46
 RDW CV 12.90 %MPV 9.90 fLPLT 311 NRBC# 0.00 NRBC% 0.0 %NEUT 64.90 %%LYMP 22.50 
%%MONO 7.20 %%EOS 5.10 %%BASO 0.30 %#NEUT 4.45 #LYMP 1.54 #MONO 0.49 #EOS 0.35 
#BASO 0.02 MANUAL DIFF NOT IND 

 

                          2011 12:00 AM         Mammogram -Women over 40 Ordered 

 

                          2011 10:25 AM        TRIGLYCERIDES 111.0 mg/dLCHOLESTEROL 195.0 mg/dLHDL 43.0 mg/dLTOT

 CHOL/HDL 4.5 LDL (CALC) 130.0 mg/dLWBC 5.3 RBC 3.76 HGB 12.0 g/dLHCT 37.80 %MCV
 101.0 fLMCH 31.90 pgMCHC 31.70 g/dLRDW SD 47 RDW CV 13.0 %MPV 9.70 fLPLT 259 
NRBC# 0.00 NRBC% 0.0 %NEUT 69.0 %%LYMP 17.60 %%MONO 8.30 %%EOS 4.70 %%BASO 0.40 
%#NEUT 3.63 #LYMP 0.93 #MONO 0.44 #EOS 0.25 #BASO 0.02 MANUAL DIFF NOT IND 
GLUCOSE 102.0 mg/dLSODIUM 146.0 mmol/LPOTASSIUM 4.20 mmol/LCHLORIDE 113.0 
mmol/LCO2 23.0 mmol/LBUN 15.0 mg/dLCREATININE 1.0 mg/dLSGOT/AST 12.0 
IU/LSGPT/ALT 17.0 IU/LALK PHOS 60.0 IU/LTOTAL PROTEIN 6.90 g/dLALBUMIN 4.20 
g/dLTOTAL BILI 0.40 mg/dLCALCIUM 9.70 mg/dLAGE 60 GFR NonAA 57 GFR AA 69 eGFR 57
 eGFR AA* >60 

 

                          2011 11:49 AM        Cholest Cry Stone Ql .0 %LDLc SerPl-mCnc 130.0 mg/dLHDLc

 SerPl-mCnc 43.0 mg/dLTrigl SerPl-mCnc 111.0 mg/dLGlucose SerPl-mCnc 102.0 mg/dL

 

                          2011 11:52 AM        Pap Smear Declined 

 

                          2011 11:28 AM        Lithium 2.080 mmol/LGLUCOSE 102.0 mg/dLSODIUM 135.0 mmol/LPOTASSIUM

 3.90 mmol/LCHLORIDE 106.0 mmol/LCO2 21.0 mmol/LBUN 12.0 mg/dLCREATININE 1.30 
mg/dLCALCIUM 10.70 mg/dLAGE 60 GFR NonAA 42 GFR AA 51 eGFR 42 eGFR AA* 51 

 

                          2011 8:58 AM          Lithium 0.690 mmol/L

 

                          2011 2:38 PM         VITAMIN B12 3483.0 pg/mL

 

                          2013 3:35 PM          WBC 5.1 RBC 3.73 HGB 11.70 g/dLHCT 36.40 %MCV 98.0 fLMCH 31.40

 pgMCHC 32.10 g/dLRDW SD 47 RDW CV 13.10 %MPV 9.80 fLPLT 224 NRBC# 0.00 NRBC% 
0.0 %NEUT 66.80 %%LYMP 19.10 %%MONO 9.0 %%EOS 4.90 %%BASO 0.20 %#NEUT 3.42 #LYMP
 0.98 #MONO 0.46 #EOS 0.25 #BASO 0.01 MANUAL DIFF NOT IND GLUCOSE 88.0 
mg/dLSODIUM 141.0 mmol/LPOTASSIUM 4.10 mmol/LCHLORIDE 110.0 mmol/LCO2 22.0 
mmol/LBUN 22.0 mg/dLCREATININE 1.10 mg/dLCALCIUM 9.80 mg/dLAGE 62 GFR NonAA 50 
GFR AA 61 eGFR 50 eGFR AA* 60 Lithium 0.760 mmol/L

 

                          2013 11:02 AM        TRIGLYCERIDES 106.0 mg/dLCHOLESTEROL 181.0 mg/dLHDL 46.0 mg/dLTOT

 CHOL/HDL 3.9 LDL (CALC) 114.0 mg/dLVITAMIN D 41.10 ng/mL

 

                          10/17/2014 10:10 AM       WBC 5.0 RBC 3.66 HGB 11.60 g/dLHCT 36.80 %.0 fLMCH 31.70

 pgMCHC 31.50 g/dLRDW SD 50 RDW CV 13.50 %MPV 10.10 fLPLT 209 NRBC# 0.00 NRBC% 
0.0 %NEUT 69.20 %%LYMP 21.0 %%MONO 6.40 %%EOS 3.20 %%BASO 0.20 %#NEUT 3.46 #LYMP
 1.05 #MONO 0.32 #EOS 0.16 #BASO 0.01 MANUAL DIFF NOT IND GLUCOSE 100.0 
mg/dLSODIUM 148.0 mmol/LPOTASSIUM 3.90 mmol/LCHLORIDE 114.0 mmol/LCO2 26.0 
mmol/LBUN 12.0 mg/dLCREATININE 1.20 mg/dLSGOT/AST 9.0 IU/LSGPT/ALT <6 IU/LALK 
PHOS 82.0 IU/LTOTAL PROTEIN 6.90 g/dLALBUMIN 4.0 g/dLTOTAL BILI 0.40 
mg/dLCALCIUM 10.50 mg/dLAGE 64 GFR NonAA 45 GFR AA 55 eGFR 45 eGFR AA* 55 
TRIGLYCERIDES 96.0 mg/dLCHOLESTEROL 155.0 mg/dLHDL 38.0 mg/dLTOT CHOL/HDL 4.1 
LDL (CALC) 98.0 mg/dLLithium 0.850 mmol/LCancer Antigen (CA) 125 8.30 U/mL

 

                          2015 10:25 AM        Lithium 0.790 mmol/LWBC 4.8 RBC 3.44 HGB 11.0 g/dLHCT 35.20 

%.0 fLMCH 32.0 pgMCHC 31.30 g/dLRDW SD 53 RDW CV 14.0 %MPV 9.30 fLPLT 210
 NRBC# 0.00 NRBC% 0.0 %NEUT 70.80 %%LYMP 17.20 %%MONO 8.10 %%EOS 3.50 %%BASO 
0.40 %#NEUT 3.41 #LYMP 0.83 #MONO 0.39 #EOS 0.17 #BASO 0.02 MANUAL DIFF NOT IND 
TRIGLYCERIDES 107.0 mg/dLCHOLESTEROL 174.0 mg/dLHDL 43.0 mg/dLTOT CHOL/HDL 4.0 
LDL (CALC) 110.0 mg/dLGLUCOSE 90.0 mg/dLSODIUM 145.0 mmol/LPOTASSIUM 3.80 
mmol/LCHLORIDE 115.0 mmol/LCO2 24.0 mmol/LBUN 17.0 mg/dLCREATININE 1.30 
mg/dLSGOT/AST 18.0 IU/LSGPT/ALT 17.0 IU/LALK PHOS 56.0 IU/LTOTAL PROTEIN 6.70 
g/dLALBUMIN 3.90 g/dLTOTAL BILI 0.40 mg/dLCALCIUM 9.80 mg/dLAGE 64 GFR NonAA 41 
GFR AA 50 eGFR 41 eGFR AA* 50 

 

                          2015 8:50 AM        WBC 5.8 RBC 3.29 HGB 10.70 g/dLHCT 34.0 %.0 fLMCH 32.50

 pgMCHC 31.50 g/dLRDW SD 52 RDW CV 13.60 %MPV 9.60 fLPLT 223 NRBC# 0.00 NRBC% 
0.0 %NEUT 69.60 %%LYMP 18.90 %%MONO 8.50 %%EOS 2.80 %%BASO 0.20 %#NEUT 4.03 
#LYMP 1.09 #MONO 0.49 #EOS 0.16 #BASO 0.01 MANUAL DIFF NOT IND Lithium 0.620 
mmol/LGLUCOSE 83.0 mg/dLSODIUM 139.0 mmol/LPOTASSIUM 3.90 mmol/LCHLORIDE 109.0 
mmol/LCO2 22.0 mmol/LBUN 19.0 mg/dLCREATININE 1.40 mg/dLSGOT/AST 19.0 
IU/LSGPT/ALT 21.0 IU/LALK PHOS 55.0 IU/LTOTAL PROTEIN 6.50 g/dLALBUMIN 3.90 
g/dLTOTAL BILI 0.50 mg/dLCALCIUM 9.60 mg/dLAGE 65 GFR NonAA 38 GFR AA 46 eGFR 38
 eGFR AA* 46 TRIGLYCERIDES 121.0 mg/dLCHOLESTEROL 192.0 mg/dLHDL 51.0 mg/dLTOT 
CHOL/HDL 3.8 .0 mg/dLFREE T4 0.79 TSH 1.210 uIU/mLHemoglobin A1c 5.40 
%Estim. Avg Glu (eAG) 108 

 

                          3/15/2016 8:08 AM         VITAMIN B12 696.0 pg/mL

 

                          3/23/2016 8:26 AM         WBC 7.0 RBC 3.61 HGB 11.80 g/dLHCT 37.70 %.0 fLMCH 32.70

 pgMCHC 31.30 g/dLRDW SD 49 RDW CV 12.50 %MPV 10.0 fLPLT 207 NRBC# 0.00 NRBC% 
0.0 %NEUT 73.60 %%LYMP 16.40 %%MONO 6.60 %%EOS 3.0 %%BASO 0.30 %#NEUT 5.15 #LYMP
 1.15 #MONO 0.46 #EOS 0.21 #BASO 0.02 MANUAL DIFF NOT IND Lithium 0.940 
mmol/LGLUCOSE 108.0 mg/dLSODIUM 143.0 mmol/LPOTASSIUM 4.30 mmol/LCHLORIDE 110.0 
mmol/LCO2 27.0 mmol/LBUN 16.0 mg/dLCREATININE 1.60 mg/dLSGOT/AST 13.0 
IU/LSGPT/ALT 7.0 IU/LALK PHOS 71.0 IU/LTOTAL PROTEIN 6.80 g/dLALBUMIN 4.0 
g/dLTOTAL BILI 0.20 mg/dLCALCIUM 10.40 mg/dLAGE 65 GFR NonAA 32 GFR AA 39 eGFR 
32 eGFR AA* 39 TRIGLYCERIDES 113.0 mg/dLCHOLESTEROL 169.0 mg/dLHDL 42.0 mg/dLTOT
 CHOL/HDL 4.0 LDL (CALC) 104.0 mg/dLFREE T4 0.86 TSH 2.20 uIU/mLHemoglobin A1c 
5.20 %Estim. Avg Glu (eAG) 103 

 

                          3/25/2016 9:17 AM         COLOR YELLOW APPEARANCE CLEAR SPEC GRAV 1.010 pH 7.0 PROTEIN 

NEGATIVE GLUCOSE NEGATIVE mg/dLKETONE NEGATIVE BILIRUBIN NEGATIVE BLOOD NEGATIVE
 NITRITE NEGATIVE [place] SCREEN SMALL MICRO IND? SEE BELOW WBC/HPF 0-5 RBC/HPF 
NEGATIVE CASTS/LPF NEGATIVE /LPFCRYSTALS NEGATIVE MUCOUS THRDS NEGATIVE BACTERIA
 NEGATIVE EPITH CELLS FEW SQUAMOUS /HPFTRICHOMONAS NEGATIVE YEAST NEGATIVE 

 

                          2016 6:00 AM        GLUCOSE 91.0 mg/dLSODIUM 143.0 mmol/LPOTASSIUM 3.60 mmol/LCHLORIDE

 112.0 mmol/LCO2 23.0 mmol/LBUN 22.0 mg/dLCREATININE 1.20 mg/dLSGOT/AST 15.0 
IU/LSGPT/ALT 12.0 IU/LALK PHOS 61.0 IU/LTOTAL PROTEIN 5.40 g/dLALBUMIN 3.10 
g/dLTOTAL BILI 0.40 mg/dLCALCIUM 8.40 mg/dLAGE 66 GFR NonAA 45 GFR AA 55 eGFR 45
 eGFR AA* 55 WBC 3.0 RBC 3.05 HGB 9.80 g/dLHCT 32.10 %.0 fLMCH 32.10 
pgMCHC 30.50 g/dLRDW SD 54 RDW CV 14.20 %MPV 10.10 fLPLT 170 NRBC# 0.00 NRBC% 
0.0 %NEUT 50.70 %%LYMP 32.60 %%MONO 10.50 %%EOS 5.90 %%BASO 0.30 %#NEUT 1.54 
#LYMP 0.99 #MONO 0.32 #EOS 0.18 #BASO 0.01 MANUAL DIFF NOT IND 

 

                          2016 2:09 PM        COLOR YELLOW APPEARANCE CLEAR SPEC GRAV 1.010 pH 5.0 PROTEIN

 30 GLUCOSE NEGATIVE mg/dLKETONE NEGATIVE BILIRUBIN NEGATIVE BLOOD LARGE NITRITE
 NEGATIVE LEUK SCREEN MODERATE MICRO INDICATED? SEE BELOW WBC/HPF  RBC/HPF
 20-50 CASTS/LPF NEGATIVE /LPFCRYSTALS NEGATIVE MUCOUS THRDS NEGATIVE BACTERIA 
NEGATIVE EPITH CELLS FEW SQUAMOUS /HPFTRICHOMONAS NEGATIVE YEAST NEGATIVE CULT 
SET UP? YES 

 

                          1/3/2017 4:08 PM          COLOR YELLOW APPEARANCE HAZY SPEC GRAV 1.015 pH 6.0 PROTEIN 30

 GLUCOSE NEGATIVE mg/dLKETONE NEGATIVE BILIRUBIN NEGATIVE BLOOD MODERATE NITRITE
 NEGATIVE [place]UK SCREEN LARGE MICRO INDICATED? SEE BELOW WBC/-200 RBC/HPF 
5-10 CASTS/LPF NEGATIVE /LPFCRYSTALS NEGATIVE MUCOUS THRDS NEGATIVE BACTERIA 
NEGATIVE EPITH CELLS 1+ SQUAMOUS /HPFTRICHOMONAS NEGATIVE YEAST NEGATIVE CULT 
SET UP? YES 

 

                          2017 4:24 PM         CREATININE 1.50 mg/dLAGE 66 GFR NonAA 35 GFR AA 42 eGFR 35 eGFR

 AA* 42 VITAMIN B12 1324.0 pg/mL

 

                          2017 11:30 AM         GLUCOSE 93.0 mg/dLSODIUM 143.0 mmol/LPOTASSIUM 3.10 mmol/LCHLORIDE

 101.0 mmol/LCO2 29.0 mmol/LBUN 26.0 mg/dLCREATININE 1.50 mg/dLSGOT/AST 23.0 
IU/LSGPT/ALT 13.0 IU/LALK PHOS 66.0 IU/LTOTAL PROTEIN 7.70 g/dLALBUMIN 4.30 
g/dLTOTAL BILI 0.40 mg/dLCALCIUM 10.30 mg/dLAGE 66 GFR NonAA 35 GFR AA 42 eGFR 
35 eGFR AA* 42 TRIGLYCERIDES 147.0 mg/dLCHOLESTEROL 184.0 mg/dLHDL 44.0 mg/dLTOT
 CHOL/HDL 4.2 .0 mg/dLWBC 5.4 RBC 3.98 HGB 12.90 g/dLHCT 40.20 %.0
 fLMCH 32.40 pgMCHC 32.10 g/dLRDW SD 50 RDW CV 13.50 %MPV 9.30 fLPLT 210 NRBC# 
0.00 NRBC% 0.0 %NEUT 54.20 %%LYMP 30.70 %%MONO 9.10 %%EOS 5.20 %%BASO 0.40 
%#NEUT 2.94 #LYMP 1.66 #MONO 0.49 #EOS 0.28 #BASO 0.02 MANUAL DIFF NOT IND FREE 
T4 1.09 COLOR YELLOW APPEARANCE CLEAR SPEC GRAV <=1.005 pH 5.5 PROTEIN NEGATIVE 
GLUCOSE NEGATIVE mg/dLKETONE NEGATIVE BILIRUBIN NEGATIVE BLOOD NEGATIVE NITRITE 
NEGATIVE LEUK SCREEN LARGE MICRO INDICATED? SEE BELOW WBC/HPF 10-20 RBC/HPF 0-5 
CASTS/LPF NEGATIVE /LPFCRYSTALS NEGATIVE MUCOUS THRDS NEGATIVE BACTERIA FEW 
EPITH CELLS 1+ SQUAMOUS /HPFTRICHOMONAS NEGATIVE YEAST NEGATIVE CULT SET UP? YES
 TSH 1.820 uIU/mL

 

                          2017 2:45 PM         COLOR YELLOW APPEARANCE CLEAR SPEC GRAV <=1.005 pH 6.0 PROTEIN

 NEGATIVE GLUCOSE NEGATIVE mg/dLKETONE NEGATIVE BILIRUBIN NEGATIVE BLOOD TRACE-
INTACT NITRITE NEGATIVE LEUK SCREEN SMALL MICRO INDICATED? SEE BELOW WBC/HPF 0-5
 RBC/HPF NEGATIVE CASTS/LPF NEGATIVE /LPFCRYSTALS NEGATIVE MUCOUS THRDS NEGATIVE
 BACTERIA FEW EPITH CELLS FEW SQUAMOUS /HPFTRICHOMONAS NEGATIVE YEAST NEGATIVE 
CULT SET UP? YES 







History Of Immunizations







       Name    Date Admin    Mfg Name    Mfg Code    Trade Name    Lot#    Route    Inj    Vis Given    Vis

 Pub                                    CVX

 

        Influenza    2008    Not Entered    NE      Not Entered            Not Entered    Not Entered

                    1            999

 

        X       12/19/2008    [employer]    MSD     Pneumovax 23            Intramuscular    Not Entered

                    1            999

 

           Influenza    10/15/2010    [employer]    NOV        Fluvirin > 12 Years    

[id number]     Intramuscular    Left Deltoid    10/15/2010    2009    999

 

          X         3/23/2015    [facility]    [facility]       Prevnar 13    [id number]    Intramuscular

                Right Gluteous Medius    3/23/2015       2013       109







History of Past Illness







                    Name                Date of Onset       Comments

 

                    Peritoneal Neoplasm, Malignant                         

 

                    Hyperlipidemia                           

 

                    Bipolar disorder, unspecified                         

 

                    Artificial opening status; colostomy                         

 

                    B12 deficiency                           

 

                    Anemia, Pernicious                         

 

                    Arthritis unspecified                         

 

                    cervical cancer                          

 

                    Artificial opening status; colostomy    2010  1:10PM     

 

                    Bipolar disorder, unspecified    2010  1:10PM     

 

                    Hyperlipidemia      2010  1:10PM     

 

                    Anemia, Pernicious    2010  1:10PM     

 

                    Postoperative Follow-Up    2010  1:55PM     

 

                    Postoperative Follow-Up    Mar  8 2010 10:57AM     

 

                    Artificial opening status; colostomy    Mar  8 2010  1:19PM     

 

                    Peritoneal Neoplasm, Malignant    Mar  8 2010  1:19PM     

 

                    Artificial opening status; colostomy    2010  1:40PM     

 

                    Hyperlipidemia      2010  1:40PM     

 

                    Anemia, Pernicious    2010  1:40PM     

 

                    Peritoneal Neoplasm, Malignant    2010  1:40PM     

 

                    Arthritis unspecified    2010  1:40PM     

 

                    Anemia of Chronic Illness    2010  1:40PM     

 

                    Tinea corporis      2010  3:17PM     

 

                    Bipolar disorder, unspecified    2010  1:33PM     

 

                    Hyperlipidemia      2010  1:33PM     

 

                    Anemia, Pernicious    2010  1:33PM     

 

                    Peritoneal Neoplasm, Malignant    2010  1:33PM     

 

                    B12 deficiency      2010  1:33PM     

 

                    Ethmoidal Sinusitis, Acute    Sep 21 2010  3:53PM     

 

                    Wheezing            Sep 21 2010  3:53PM     

 

                    Flu                 Oct 15 2010  1:40PM     

 

                    Bipolar disorder, unspecified    Oct 15 2010  1:42PM     

 

                    Hyperlipidemia      Oct 15 2010  1:42PM     

 

                    Anemia, Pernicious    Oct 15 2010  1:42PM     

 

                    Peritoneal Neoplasm, Malignant    Oct 15 2010  1:42PM     

 

                    Bipolar disorder, unspecified    2011 12:01PM     

 

                    Hyperlipidemia      2011 12:01PM     

 

                    Anemia, Pernicious    2011 12:01PM     

 

                    Peritoneal Neoplasm, Malignant    2011 12:01PM     

 

                    Bipolar disorder, unspecified    Apr 15 2011 10:55AM     

 

                    Major Depression    2011 10:11AM     

 

                    Bipolar Disorder    2011 10:11AM     

 

                    Cancer              May 10 2011  4:16PM     

 

                    Major Depression    May 10 2011  3:16PM     

 

                    Bipolar Disorder    May 10 2011  3:16PM     

 

                    Hypercalcemia       May 23 2011  2:47PM     

 

                    Bipolar disorder, unspecified    May 23 2011  2:47PM     

 

                    Colon Cancer, Personal History    May 23 2011  2:47PM     

 

                    Bipolar Disorder    May 31 2011  4:39PM     

 

                    Depressive Disorder    2011 10:01AM     

 

                    Vitamin B12 deficiency    2011 10:01AM     

 

                    Vitamin D Deficiency    2011  5:07PM     

 

                    Anemia, Vitamin B12 Deficiency    2011  5:07PM     

 

                    B12 deficiency      2011  3:56PM     

 

                    Routine gynecological examination    Aug  4 2011  9:08AM     

 

                    Screening Examination for Breast Cancer    Aug  4 2011  9:08AM     

 

                    Tinea Corporis      Aug  4 2011  9:08AM     

 

                    Depressive Disorder    Sep 23 2011  8:47AM     

 

                    Contact Dermatitis    Sep 23 2011  8:47AM     

 

                    Anemia, Pernicious    Sep 23 2011  8:47AM     

 

                    B12 deficiency      Sep 23 2011  8:47AM     

 

                    B12 deficiency      Sep 27 2011  2:58PM     

 

                    B12 deficiency      Oct 20 2011  2:34PM     

 

                    Flu                 Dec  9 2011  3:16PM     

 

                    B12 deficiency      Dec  9 2011  3:17PM     

 

                    B12 deficiency      2012  4:52PM     

 

                    B12 deficiency      2012 11:10AM     

 

                    B12 deficiency      2012  3:37PM     

 

                    B12 deficiency      May  3 2012  4:10PM     

 

                    B12 deficiency      2012  2:54PM     

 

                    B12 deficiency      2012 11:23AM     

 

                    B12 deficiency      Aug  9 2012  2:08PM     

 

                    B12 deficiency      Sep  6 2012  4:36PM     

 

                    B12 deficiency      Oct 16 2012 10:23AM     

 

                    Flu                 Feb  2013  3:11PM     

 

                    Bipolar disorder, unspecified    Feb  2013  2:48PM     

 

                    Anemia, Pernicious    Feb  2013  2:48PM     

 

                    B12 deficiency      Feb  2013  2:48PM     

 

                    Extrapyramidal abnormal movement disorder    Feb  4   2:48PM     

 

                    B12 deficiency      Apr  3 2013 12:03PM     

 

                    Bipolar disorder, unspecified    May  7 2013  1:31PM     

 

                    Anemia, Pernicious    May  7 2013  1:31PM     

 

                    B12 deficiency      May  7 2013  1:31PM     

 

                    Extrapyramidal abnormal movement disorder    May  7 2013  1:31PM     

 

                    B12 deficiency      2013  3:42PM     

 

                    B12 deficiency      2013  1:31PM     

 

                    Hyperlipidemia      Aug  7 2013 10:37AM     

 

                    Vitamin D Deficiency    Aug  7 2013 10:37AM     

 

                    Bipolar disorder, unspecified    Aug  7 2013 10:37AM     

 

                    Anemia, Pernicious    Aug  7 2013 10:37AM     

 

                    B12 deficiency      Aug  7 2013 10:37AM     

 

                    B12 deficiency      Sep 25 2013 11:15AM     

 

                    B12 deficiency      Dec 11 2013  3:16PM     

 

                    B12 deficiency      Mar  6 2014  1:48PM     

 

                    B12 deficiency      May 21 2014  3:17PM     

 

                    Screening Examination for Breast Cancer    2014  3:23PM     

 

                    Periumbilical abdominal pain    2014  3:23PM     

 

                    B12 deficiency      Jul 10 2014  2:52PM     

 

                    Anemia, Vitamin B12 Deficiency    Aug 13 2014  4:50PM     

 

                    Bipolar disorder    Oct 16 2014 11:13AM     

 

                    Hyperlipidemia      Oct 16 2014 11:13AM     

 

                    Anemia, Pernicious    Oct 16 2014 11:13AM     

 

                    Peritoneal Neoplasm, Malignant    Oct 16 2014 11:13AM     

 

                    Screening breast examination    Oct 16 2014 11:13AM     

 

                    Weight loss         Oct 16 2014 11:13AM     

 

                    Anemia, Pernicious    Mar 23 2015  2:57PM     

 

                    B12 deficiency      Mar 23 2015  2:57PM     

 

                    Need for Prevnar vaccine    Mar 23 2015  2:57PM     

 

                    Bipolar disorder    Mar 23 2015  2:57PM     

 

                    Hyperlipidemia      Mar 23 2015  2:57PM     

 

                    Anemia, Pernicious    Mar 23 2015  2:57PM     

 

                    Peritoneal Neoplasm, Malignant    Mar 23 2015  2:57PM     

 

                    B12 deficiency      May  4 2015  4:48PM     

 

                    Hyperlipidemia      May 13 2015  9:56AM     

 

                    Anemia              May 13 2015  9:56AM     

 

                    Bipolar disorder    May 13 2015  9:56AM     

 

                    Bipolar disorder    May 14 2015  3:27PM     

 

                    Hyperlipidemia      May 14 2015  3:27PM     

 

                    Anemia, Pernicious    May 14 2015  3:27PM     

 

                    Peritoneal Neoplasm, Malignant    May 14 2015  3:27PM     

 

                    B12 deficiency      2015  2:20PM     

 

                    B12 deficiency      2015 11:34AM     

 

                    B12 deficiency      Aug 18 2015  9:06AM     

 

                    Tinea Corporis      Sep 18 2015  8:54AM     

 

                    B12 deficiency      Sep 18 2015  8:54AM     

 

                    B12 deficiency      2015 10:28AM     

 

                    Herpes zoster without complication    Dec  3 2015  9:52AM     

 

                    B12 deficiency      Dec 23 2015 11:21AM     

 

                    B12 deficiency      2016  4:51PM     

 

                    Vitamin B 12 deficiency    Mar 14 2016  5:35PM     

 

                    B12 deficiency      Mar 15 2016 12:14PM     

 

                    B12 deficiency      May  5 2016 11:30AM     

 

                    Edema               May  5 2016 11:30AM     

 

                    Dermatitis          May  5 2016 11:30AM     

 

                    Edema               May 17 2016  8:38AM     

 

                    Shortness of breath    May 17 2016  8:38AM     

 

                    Bilateral edema of lower extremity    2016  2:06PM     

 

                    B12 deficiency      2016  2:06PM     

 

                    B12 deficiency      2016 11:50AM     

 

                    B12 deficiency      2016 11:20AM     

 

                    Diarrhea            Aug  2 2016  3:13PM     

 

                    B12 deficiency      Aug 24 2016 11:10AM     

 

                    Encounter for screening mammogram for breast cancer    Aug 24 2016 11:44AM     

 

                    B12 deficiency      Sep 28 2016  2:35PM     

 

                    B12 deficiency      Dec 15 2016  2:02PM     

 

                    Dysuria             Dec 29 2016 12:14PM     

 

                    Hematuria           Fidencio  3 2017  1:33PM     

 

                    Constipation by delayed colonic transit    2017  1:52PM     

 

                    Ileus               2017  1:52PM     

 

                    UTI (urinary tract infection)    Fidencio 15 2017  3:39PM     

 

                    Acute cystitis with hematuria    2017 11:07AM     

 

                    B12 deficiency      2017 11:07AM     

 

                    B12 deficiency      2017 11:40AM     

 

                    B12 deficiency      2017  4:07PM     

 

                    Slurred speech      2017  3:07PM     

 

                    Vitamin B12 deficiency    2017  3:07PM     

 

                    Dysphagia, unspecified type    2017  3:07PM     

 

                    Hyperlipidemia      2017  3:07PM     

 

                    Dysuria             2017 12:01PM     

 

                    B12 deficiency      2017  2:08PM     

 

                    Dysuria             2017 10:58AM     







Payers







           Insurance Name    Company Name    Plan Name    Plan Number    Policy Number    Policy Group

 Number                                 Start Date

 

                    Medicare RHC Medicare RHC              [id number]              N/A

 

                          [employer]    [employer]                 [id number]

                                                    

 

                    Medicare Part A    Medicare - Lab/Xray              [id number]              2006

 

                    Medicare Part B    Medicare [place]              [id number]              2006

 

                          [employer]    [facility] [employer]                 50 percent

                                                    2009

 

                    [facility]              [id number]              N/A

 

                    Medicare Part A    Medicare Part A              [id number]              N/A

 

                    Medicare Part A    Medicare Part A              [id number]              2006









History of Encounters







                    Visit Date          Visit Type          Provider

 

                    2017           Nurse visit         Bhupinder Aspen DO

 

                    2017           Office visit         

 

                    2017           Office visit        Bhupinder Aspen DO

 

                    2017           Nurse visit         Bhupinder Aspen DO

 

                    2017           Office visit        Radha Ontiveros APRN

 

                    1/15/2017           Office visit        Aj Tapia NP

 

                    2017            Office visit        Devin Masterson MD

 

                    2016          [facility]            Devin Masterson MD

 

                    12/15/2016          Nurse visit         Bhupinder Aspen DO

 

                    2016           Nurse visit         Bret Forte APRN

 

                    2016           Nurse visit         Bhupinder Aspen DO

 

                    2016            Office visit        Bhupinder Aspen DO

 

                    2016           Nurse visit         Bhupinder Aspen DO

 

                    2016           Office visit        Bret Forte APRN

 

                    2016            Office visit        Bhupinder Aspen DO

 

                    3/15/2016           Nurse visit         Bhupinder Aspen DO

 

                    2016            Nurse visit         Bhupinder Aspen DO

 

                    2015          Nurse visit         Bhupinder Aspen DO

 

                    12/3/2015           Office visit        Bhupinder Aspen DO

 

                    2015          Nurse visit         Bhupinder Aspen DO

 

                    2015           Office visit        Bhupinder Aspen DO

 

                    2015           Nurse visit         Bhupinder Aspen DO

 

                    2015            Nurse visit         Bhupinder Aspen DO

 

                    2015            Nurse visit         Bhupinder Aspen DO

 

                    2015           Office visit        Bhupinder Aspen DO

 

                    2015            Nurse visit         Bhupinder Aspen DO

 

                    3/23/2015           Office visit        Bhupinder Aspen DO

 

                    10/16/2014          Office visit        Bhupinder Aspen DO

 

                    2014           Nurse visit         Radha Ontiveros APRN

 

                    7/10/2014           Nurse visit         Bhupinder Aspen DO

 

                    2014           Office visit        Bhupinder Aspen DO

 

                    2014           Nurse visit         Bhupinder Aspen DO

 

                    3/6/2014            Nurse visit         Bhupinder Aspen DO

 

                    2014            [facility]            EARNEST Lopez MD

 

                    2013          Nurse visit         Bhupinder Aspen DO

 

                    2013           Nurse visit         Bhupinder Aspen DO

 

                    2013            Office visit        Bhupinder Aspen DO

 

                    2013            Nurse visit         Bhupinder Aspen DO

 

                    2013            Nurse visit         Bhupinder Aspen DO

 

                    2013            Office visit        Bhupinder Aspen DO

 

                    4/3/2013            Nurse visit         Bhupinder Aspen DO

 

                    2013            Office visit        Bhupinder Aspen DO

 

                    10/16/2012          Nurse visit         Bhupinder Aspen DO

 

                    2012            Nurse visit         Bhupinder Aspen DO

 

                    2012            Voided              Bhupinder Aspen DO

 

                    2012            Nurse visit         Bhupinder Aspen DO

 

                    2012            Nurse visit         Bhupinder Aspen DO

 

                    2012           Nurse visit         Bhupinder Aspen DO

 

                    5/3/2012            Nurse visit         Bhupinder Aspen DO

 

                    2012           Nurse visit         Bhupinder Aspen DO

 

                    2012           Nurse visit         Bhupinder Aspen DO

 

                    2012           Nurse visit         Bhupinder Aspen DO

 

                    2011           Nurse visit         Bhupinder Aspen DO

 

                    10/20/2011          Nurse visit         Bhupinder Aspen DO

 

                    2011           Office visit        Bhupinder Aspen DO

 

                    2011           Nurse visit         Radha GREEN

 

                    2011            Office visit        Bhupinder Aspen DO

 

                    2011           Nurse visit         Bhupinder Aspen DO

 

                    2011            Office visit        Bhupinder Aspen DO

 

                    2011           Office visit        Bhupinder Aspen DO

 

                    5/10/2011           Office visit        Bhupinder Aspen DO

 

                    2011           Office visit        Bhupinder Aspen DO

 

                    4/15/2011           Office visit        Devin Angel DO

 

                    2011           Office visit        Devin Angel DO

 

                    10/15/2010          Office visit        Devin Angel DO

 

                    2010           Office visit        Devin Angel DO

 

                    2010            Office visit        Devin Angel DO

 

                    2010           Office visit        Devin Angel DO

 

                    2010            Office visit        Devin Angel DO

 

                    3/8/2010            Office visit        Devin Masterson MD

 

                    2010            Surgery             Devin Masterson MD

 

                    2010            Office visit        Devin Angel DO

 

                    2010           Surgery             Devin Masterson MD

 

                    2010           Hospital            Devin Masterson MD

 

                    2010           [facility]            Devin Masterson MD

 

                    10/22/2009          Office visit        Devin Angel DO

## 2019-06-26 NOTE — XMS REPORT
MU2 Ambulatory Summary

                             Created on: 2018



Pauline Gan

External Reference #: [id number]

: 1950

Sex: Female



Demographics







                          Address                   [address] [place]  [address]

 

                          Home Phone                [phone]

 

                          Preferred Language        English

 

                          Marital Status            

 

                          Scientology Affiliation     Unknown

 

                          Race                      White

 

                          Ethnic Group              Not  or 





Author







                          Author                    Bhupinder Louise

 

                          [facility]

 

                          Address                   [address]

[place]  [id number]



 

                          Phone                     [phone]







Care Team Providers







                    Care Team Member Name    Role                Phone

 

                    Bhupinder Louise    PCP                 [phone]

 

                    Bhupinder Louise    PreferredProvider    [phone]







Allergies and Adverse Reactions







                    Name                Reaction            Notes

 

                    NO KNOWN DRUG ALLERGIES                         







Plan of Treatment







             Planned Activity    Comments     Planned Date    Planned Time    Plan/Goal

 

             Injection,Subcutaneous/Intramuscul, [facility]                 2017    12:00 AM      







Medications







                                        Active 

 

             Name         Start Date    Estimated Completion Date    SIG          Comments

 

             pravastatin 40 mg oral tablet    3/30/2015                 TAKE 1 TABLET BY MOUTH DAILY     

 

                Namenda XR 28 mg oral capsule,sprinkle,ER 24hr    2016                       take 1 capsule (28

 mg) by oral route once daily            

 

                potassium chloride 10 mEq oral tablet extended release    2016                       take 1 tablet

 (10 meq) by oral route once daily       

 

             pravastatin 40 mg oral tablet    2016                 TAKE 1 TABLET BY MOUTH DAILY     

 

                Vitamin B-12 1,000 mcg/mL injection solution    2016                       inject 1 milliliter 

(1,000 mcg) by intramuscular route once a month     

 

                potassium chloride 10 mEq oral tablet extended release    2016                      take 1 tablet

 (10 meq) by oral route once daily       

 

                Namenda XR 28 mg oral capsule,sprinkle,ER 24hr    2016                      TAKE 1 CAPSULE BY

 MOUTH EVERY DAY                         

 

                potassium chloride 10 mEq oral tablet extended release    2017                       TAKE 2 TABLETs

 BY MOUTH twice DAILY for one week       

 

                carbamazepine 200 mg oral tablet    2017                       TAKE 1 TABLET BY MOUTH BEFORE BREAKFAST

 AND TAKE 2 TABLETS AT BEDTIME           

 

                aspirin 81 mg oral tablet,delayed release (DR/EC)    2017                       TAKE 1 TABLET BY

 MOUTH EVERY DAY                         

 

             MULTI-VITAMINS    2017                 TAKE 1 TABLET BY MOUTH EVERY DAY     

 

                sertraline 100 mg oral tablet    2017       TAKE ONE AND ONE-HALF TABLETS

 BY MOUTH DAILY IN THE MORNING           

 

                carbamazepine 200 mg oral tablet    2018                       TAKE 1 TABLET BY MOUTH BEFORE BREAKFAST

 AND TAKE 2 TABLETS AT BEDTIME for 30 days     

 

                rivastigmine tartrate 1.5 mg oral capsule    2018                       TAKE 1 CAPSULE BY MOUTH

 TWICE DAILY BEFORE MEALS for 30 days     

 

                simvastatin 20 mg oral tablet    2018                       TAKE 1 TABLET BY MOUTH AT BEDTIME for

 30 days                                 

 

             famotidine 20 mg oral tablet    2018                 TAKE 1 TABLET BY MOUTH TWICE DAILY    

 

 

             quetiapine 25 mg oral tablet    2018                 TAKE 1 TABLET BY MOUTH AT BEDTIME     



 

                clindamycin HCl 150 mg oral capsule    2018       take 1 capsule (150 mg)

 by oral route 2 times per day for 7 days     

 

                Namenda XR 28 mg oral capsule,sprinkle,ER 24hr                                    take 1 capsule (28 mg) by 

oral route once daily                    

 

                bisacodyl 5 mg oral tablet,delayed release (DR/EC)    2018                       take 3 tablets

 by oral route daily                    Mediaction adjusted 18









                                         

 

             Name         Start Date    Expiration Date    SIG          Comments

 

             Reglan 10mg    3/29/2010    2010    one ac and hs     

 

                Keflex 500 mg oral capsule    2010       10/1/2010       take 1 capsule (500 mg) by oral

 route every 6 hours for 10 days         

 

                Bactrim -160 mg oral tablet    2011       take 1 tablet by oral route

 every 12 hours for 7 days               

 

                triamcinolone acetonide 0.1 % topical cream    2011      apply a thin

 layer to the affected area(s) by topical route 2 times per day     

 

                ergocalciferol (vitamin D2) 50,000 unit oral capsule    4/15/2013       2013       TAKE

 ONE CAPSULE BY MOUTH ONCE A WEEK        

 

                CYANOCOBALAM 1000MCGINJ 1000 milliliter    2013       INJECT 1ML INTRAMUSCULAR

 ONCE A MONTH                            

 

                pravastatin 40 mg oral tablet    3/25/2014       3/20/2015       TAKE ONE TABLET BY MOUTH EVERY

 DAY                                     

 

                          Zostavax (PF) 19,400 unit/0.65 mL subcutaneous suspension for reconstitution    3/23/2015

                    3/24/2015           inject 0.65 milliliter by subcutaneous route once     

 

                famciclovir 500 mg oral tablet    12/3/2015       12/10/2015      take 1 tablet (500 mg) by

 oral route every 8 hours for 7 days     

 

                furosemide 40 mg oral tablet    2016      take 1 tablet (40 mg) by oral

 route once daily                        

 

                Cipro 500 mg oral tablet    2016       take 1 tablet (500 mg) by oral route

 2 times per day for 5 days              

 

                Bactrim -160 mg oral tablet    2016        take 1 tablet by oral route

 every 12 hours for 7 days               

 

                Macrobid 100 mg oral capsule    2017       take 1 capsule (100 mg) by oral

 route 2 times per day with food for 7 days     

 

                Augmentin 875-125 mg oral tablet    2017       take 1 tablet by oral route

 every 12 hours for 7 days               

 

                amoxicillin 500 mg oral tablet    2017       take 1 tablet (500 mg) by oral

 route every 12 hours for 7 days         

 

                Namenda XR 28 mg oral capsule,sprinkle,ER 24hr    2017                       TAKE 1 CAPSULE BY 

MOUTH EVERY DAY                         Taking Generic

 

                ciprofloxacin HCl 500 mg oral tablet    2017       take 1 tablet (500 mg)

 by oral route every 12 hours for 5 days     

 

             pravastatin 40 mg oral tablet    2017                 TAKE 1 TABLET BY MOUTH DAILY    Taking

 Simvastatin

 

                cefuroxime axetil 500 mg oral tablet    2017        take 1 tablet (500 mg)

 by oral route 2 times per day for 10 days     

 

                famotidine 20 mg oral tablet    2017        TAKE 1 TABLET BY MOUTH TWICE

 DAILY                                   

 

                benztropine 1 mg oral tablet    2018        1 (One) Tablet In the Morning 

                                         









                                        Discontinued 

 

             Name         Start Date    Discontinued Date    SIG          Comments

 

                Tylenol 325 mg oral tablet                    2013        take 1 - 2 tablets (325 -650 mg) by oral

 route every 4-6 hours as needed         

 

                Calcium 600 + D(3) 600 mg(1,500mg) -400 unit oral tablet                    2011       take 1 tablet

 by oral route 2 times a day            no longer taking

 

                Vitamin B-12 1,000 mcg oral tablet extended release    2010       take 1

 tablet by oral route daily             no longer taking

 

                Antifungal (clotrimazole) 1 % topical cream    2010       apply to the 

affected and surrounding areas of skin by topical route 2 times per day morning 
and evening                              

 

                sertraline 100 mg oral tablet    5/10/2011       2011       take 2 tablets (200 mg) by 

oral route once daily                   discontinued by Dr. Serrano

 

                mirtazapine 15 mg oral tablet                    2011        take 1 tablet (15 mg) by oral route 

once daily before bedtime               dc'd by Dr. Serrano

 

                Pristiq 50 mg oral tablet extended release 24 hr                    2013        take 1 tablet (50

 mg) by oral route once daily           dose updated

 

                Vitamin B-12 1,000 mcg/mL injection solution    2011        inject 1 milliliter

 (1,000 mcg) by intramuscular route once a month    on list already

 

                    syringe with needle 1 mL 25 gauge x 1" miscellaneous syringe    2011

                          use for injection once a month     

 

                clotrimazole 1 % topical cream    2011        apply to the affected and surrounding

 areas of skin by topical route 2 times per day in the morning and evening     

 

                Vitamin D2 50,000 unit oral capsule    2011        take 1 capsule (50,000

 unit) by oral route once weekly        generic on list

 

                Pravachol 40 mg oral tablet    2012        take 1 tablet (40 mg) by oral 

route once daily for 90 days            generic on list

 

                lithium carbonate 300 mg oral capsule    2012        take 1 capsule by oral

 route daily                            dose updated

 

                Pristiq 100 mg oral tablet extended release 24 hr                    4/10/2012       take 1 and 1/2 

tablet (150 mg) by oral route once daily    Discontinued by Dr Efrain Knihgt at [facility]

 

                hydroxyzine HCl 50 mg oral tablet    10/16/2014      2015       take 1 tablet (50 mg) 

by oral route at bedtime                 

 

                Latuda 20 mg oral tablet                    2018       take 1 tablet (20 mg) by oral route once

 daily with food (at least 350 calories)     

 

                lithium carbonate 300 mg oral capsule    2015       take 1 capsule (300

 mg) by oral route 2 for 30 days         

 

                fluconazole 100 mg oral tablet    2015       12/3/2015       take 1 tablet (100 mg) by 

oral route once a week                   

 

                ketoconazole 2 % topical cream    2015       12/3/2015       apply to the affected area(s)

 by topical route 2 times per day        

 

                prednisone 10 mg oral tablet    12/3/2015       2016        take 2 tablets (20 mg) by oral

 route once daily for 4 days 1 tablet daily for 4 days 0.5 tablet daily for 4 
days                                     

 

                triamcinolone acetonide 0.1 % topical cream    2016       apply a thin layer

 to the affected area(s) by topical route 2 times per day     

 

                furosemide 40 mg oral tablet    2016                      take 1 tablet (40 mg) by oral route

 once daily                              

 

                metoclopramide HCl 10 mg oral tablet    2017                        take 1 tablet by oral route 2

 times a day for 50 days                 

 

                Cipro 500 mg oral tablet    1/15/2017       2017       take 1 tablet (500 mg) by oral route

 every 12 hours for 10 days              

 

                mirtazapine 45 mg oral tablet                    2018       take 1 tablet (45 mg) by oral route

 once daily before bedtime               

 

                Fish Oil 300-1,000 mg oral capsule                    2018       take 1 capsule by oral route daily

                                         

 

                Vitamin D3 1,000 unit oral tablet                    2018       take 1 tablet by oral route daily

                                         

 

                Calcium 600 600 mg calcium (1,500 mg) oral tablet                    2018       take 1 tablet by

 oral route daily                        

 

                Aspirin Low Dose 81 mg oral tablet,delayed release (DR/EC)                    2018       take 1

 tablet (81 mg) by oral route once daily     

 

                metoclopramide HCl 10 mg oral tablet    2017                       take 1 tablet by oral route 

2 times a day for 50 days                

 

                furosemide 40 mg oral tablet    2017       TAKE 1 TABLET BY MOUTH DAILY

                                         

 

                Linzess 72 mcg oral capsule    2017       take 1 capsule (72 mcg) by oral

 route once daily on an empty stomach at least 30 minutes before 1st meal of the
day                                      

 

                metoclopramide HCl 10 mg oral tablet    2017       TAKE 1 TABLET BY ORAL

 ROUTE 2 TIMES A DAY FOR 50 DAYS         

 

                potassium chloride 10 mEq oral tablet extended release    2017       TAKE

 2 TABLETs BY MOUTH twice DAILY for one week     

 

                BISACODYL 5MG PV (BOX OF 25)    2017       TAKE 1 TABLET BY MOUTH EVERY

 DAY                                    dose updated

 

                memantine 10 mg oral tablet    2018       TAKE 1 TABLET BY MOUTH TWICE 

DAILY for 30 days                       dose updagted







Problem List







                    Description         Status              Onset

 

                    Artificial opening status; colostomy    Active               

 

                    Bipolar disorder, unspecified    Active               

 

                    Hyperlipidemia      Active               

 

                    Peritoneal Neoplasm, Malignant    Active               

 

                    Anemia, Pernicious    Active               

 

                    Arthritis unspecified    Active               

 

                    B12 deficiency      Active               







Vital Signs







      Date    Time    BP-Sys(mm[Hg]    BP-Lynn(mm[Hg])    HR(bpm)    RR(rpm)    Temp    WT    HT    HC    BMI

                    BSA                 BMI Percentile      O2 Sat(%)

 

        2018    2:36:00 PM    134 mmHg    70 mmHg    68 bpm    22 rpm    97.9 F    142 lbs    69 in

                          20.9695 kg/m    1.7708 m                 98 %

 

        2017    1:34:00 PM    118 mmHg    62 mmHg    122 bpm    18 rpm    97.8 F    161.375 lbs    

69 in                     23.83 kg/m2    1.89 m2                   96 %

 

        2017    3:05:00 PM    134 mmHg    70 mmHg    70 bpm    20 rpm    97.4 F    181 lbs    69 in

                          26.7288 kg/m    1.9992 m                 98 %

 

        2017    11:07:00 AM    124 mmHg    64 mmHg    62 bpm    17 rpm    98.2 F    181.2 lbs    69

 in                       26.76 kg/m2    2.00 m2                   98 %

 

        1/15/2017    3:34:00 PM    148 mmHg    89 mmHg    69 bpm    20 rpm    98.2 F    179 lbs    69 in

                          26.4334 kg/m    1.9882 m                 98 %

 

       2017    1:51:00 PM    160 mmHg    90 mmHg    100 bpm    20 rpm    96.5 F    179 lbs             

                                                                98 %

 

       2016    3:11:00 PM    134 mmHg    76 mmHg    80 bpm    20 rpm    98 F    163 lbs    69 in     

                24.0706 kg/m    1.8972 m                      98 %

 

        2016    2:04:00 PM    142 mmHg    86 mmHg    68 bpm    16 rpm    98.5 F    166 lbs    63 in

                          29.41 kg/m2    1.83 m2                   100 %

 

        2016    11:27:00 AM    148 mmHg    78 mmHg    90 bpm    20 rpm    98.2 F    153 lbs    69 in

                          22.5939 kg/m    1.8381 m                 96 %

 

        12/3/2015    9:50:00 AM    132 mmHg    70 mmHg    62 bpm    16 rpm    97.9 F    145 lbs    69 in

                          21.41 kg/m2    1.79 m2                   100 %

 

        2015    8:52:00 AM    132 mmHg    68 mmHg    52 bpm    20 rpm    97.8 F    141 lbs    69 in

                          20.8218 kg/m    1.7645 m                 100 %

 

        2015    3:25:00 PM    120 mmHg    62 mmHg    72 bpm    16 rpm    98.1 F    136 lbs    69 in

                          20.08 kg/m2    1.73 m2                   98 %

 

       3/23/2015    2:55:00 PM    130 mmHg    76 mmHg    68 bpm    18 rpm    97 F    140 lbs    69 in    

                20.6742 kg/m    1.7583 m                      98 %

 

        10/16/2014    11:11:00 AM    120 mmHg    66 mmHg    77 bpm    20 rpm    98 F    130 lbs    69 in

                          19.20 kg/m2    1.69 m2                   100 %

 

        2014    3:21:00 PM    130 mmHg    66 mmHg    63 bpm    18 rpm    97.2 F    160 lbs    69 in

                          23.6276 kg/m    1.8797 m                 99 %

 

        2013    10:35:00 AM    132 mmHg    70 mmHg    66 bpm    20 rpm    98.1 F    157 lbs    69 in

                          23.18 kg/m2    1.86 m2                    

 

        2013    1:29:00 PM    132 mmHg    70 mmHg    76 bpm    18 rpm    98.2 F    166 lbs    69 in 

                          24.5137 kg/m    1.9146 m                  

 

       2013    2:46:00 PM    128 mmHg    70 mmHg    76 bpm    16 rpm    98 F    160 lbs    69 in     

                23.63 kg/m2     1.88 m2                          

 

        2011    8:49:00 AM    128 mmHg    78 mmHg    70 bpm    18 rpm    97.9 F    164 lbs    69 in

                          24.2183 kg/m    1.903 m                  

 

     2011    1:31:00 PM    132 mmHg    68 mmHg    84 bpm         97 F    167 lbs                        

                                         

 

        2011    9:09:00 AM    128 mmHg    70 mmHg    72 bpm    18 rpm    98.2 F    163 lbs    64 in 

                          27.9786 kg/m    1.8272 m                  

 

       2011    10:01:00 AM    132 mmHg    70 mmHg    72 bpm    18 rpm    98.2 F    154 lbs             

                                                                 

 

       2011    2:47:00 PM    128 mmHg    70 mmHg    72 bpm    18 rpm    97.8 F    156 lbs             

                                                                 

 

       5/10/2011    3:16:00 PM    144 mmHg    80 mmHg    72 bpm    18 rpm    98.2 F    158 lbs             

                                                                 

 

        2011    10:11:00 AM    132 mmHg    70 mmHg    70 bpm    18 rpm    98.2 F    168 lbs    69 in

                          24.809 kg/m    1.9261 m                  

 

        4/15/2011    10:52:00 AM    110 mmHg    60 mmHg    75 bpm    16 rpm    97.5 F    172.375 lbs    

69 in                     25.46 kg/m2    1.95 m2                   100 %

 

        2011    11:43:00 AM    120 mmHg    82 mmHg    75 bpm    16 rpm    97.2 F    178.5 lbs    69

 in                       26.3596 kg/m    1.9854 m                 100 %

 

        10/15/2010    1:32:00 PM    120 mmHg    70 mmHg    80 bpm    18 rpm    96.6 F    177 lbs    69 in

                          26.14 kg/m2    1.98 m2                   100 %

 

        2010    3:50:00 PM    168 mmHg    100 mmHg    82 bpm    18 rpm    97.8 F    177.5 lbs    69

 in                       26.2119 kg/m    1.9798 m                 97 %

 

        2010    1:21:00 PM    140 mmHg    80 mmHg    59 bpm    16 rpm    97.6 F    173.25 lbs    69 

in                        25.58 kg/m2    1.96 m2                   100 %

 

        2010    3:02:00 PM    140 mmHg    80 mmHg    61 bpm    16 rpm    97.6 F    173.125 lbs    69

 in                       25.5658 kg/m    1.9553 m                 99 %

 

        2010    1:23:00 PM    130 mmHg    80 mmHg    66 bpm    16 rpm    96.8 F    173 lbs    69 in 

                          25.55 kg/m2    1.95 m2                   100 %

 

        2010    12:58:00 PM    130 mmHg    88 mmHg    75 bpm    16 rpm    98.4 F    172.25 lbs    69

 in                       25.4366 kg/m    1.9503 m                 100 %







Social History







                    Name                Description         Comments

 

                    denies alcohol use                         

 

                    denies smoking                           

 

                    Denies illicit substance abuse                         

 

                    retired                                 direct care

 

                    Single                                   

 

                    Exercises regularly                         

 

                    Attended some college                         

 

                    Tobacco             Never smoker         







History of Procedures







                    Date Ordered        Description         Order Status

 

                    2010 12:00 AM    COMPREHEN METABOLIC PANEL    Reviewed

 

                    2010 12:00 AM    COMPLETE CBC W/AUTO DIFF WBC    Reviewed

 

                    2010 12:00 AM    LIPID PANEL         Reviewed

 

                          2015 12:00 AM        B12 Injection, Up to 1000 Mcg NDC#8738-8435-91 [facility] Medicare 

                                        Reviewed

 

                    2011 12:00 AM    MAMMOGRAM SCREENING    Reviewed

 

                    2011 12:00 AM    CYTOPATH C/V THIN LAYER    Reviewed

 

                    2011 12:00 AM    B12 Injection 1 cc NDC#[address]-92    Reviewed

 

                    2015 12:00 AM    THER/PROPH/DIAG INJ SC/IM    Reviewed

 

                    2015 12:00 AM    B12 Injection, Up to 1000 Mcg NDC#2220-4901-66    Reviewed

 

                    2011 12:00 AM    THER/PROPH/DIAG INJ SC/IM    Reviewed

 

                    2011 12:00 AM    B12 Injection([place]) Ndc#5134-9003-05-    Reviewed

 

                    2015 12:00 AM    THER/PROPH/DIAG INJ SC/IM    Reviewed

 

                    2015 12:00 AM    B12 Injection, Up to 1000 Mcg NDC#8429-5057-42    Reviewed

 

                    10/20/2011 12:00 AM    THER/PROPH/DIAG INJ SC/IM    Reviewed

 

                    10/20/2011 12:00 AM    B12 Injection([place]) Ndc#1446-4946-41-    Reviewed

 

                    2016 12:00 AM    THER/PROPH/DIAG INJ SC/IM    Reviewed

 

                    2016 12:00 AM    B12 Injection, Up to 1000 Mcg NDC#5174-8378-13    Reviewed

 

                    3/14/2016 12:00 AM    VITAMIN B-12        Reviewed

 

                    3/15/2016 12:00 AM    THER/PROPH/DIAG INJ SC/IM    Reviewed

 

                    3/15/2016 12:00 AM    B12 Injection, Up to 1000 Mcg NDC#4381-4635-41    Reviewed

 

                    2011 12:00 AM    ***Immunization administration, Medicare flu    Reviewed

 

                    2011 12:00 AM    Fluzone ** MEDICARE Only **    Reviewed

 

                    2011 12:00 AM    THER/PROPH/DIAG INJ SC/IM    Reviewed

 

                    2011 12:00 AM    B12 Injection ([facility]) Ndc#4347-8057-25    Reviewed

 

                    2016 12:00 AM    B12 Injection, Up to 1000 Mcg NDC#4628-6611-61 [facility] Medicare    

Reviewed

 

                    2016 12:00 AM    TTE W/DOPPLER COMPLETE    Reviewed

 

                    2016 12:00 AM    EXTREMITY STUDY     Reviewed

 

                          2016 12:00 AM        B12 Injection, Up to 1000 Mcg NDC#6509-0368-08 [facility] Medicare 

                                        Reviewed

 

                    2016 12:00 AM    THER/PROPH/DIAG INJ SC/IM    Reviewed

 

                    2016 12:00 AM    B12 Injection, Up to 1000 Mcg NDC#9190-6954-95    Reviewed

 

                    2016 12:00 AM    THER/PROPH/DIAG INJ SC/IM    Reviewed

 

                    2012 12:00 AM    B12 Injection([facility]) Ndc#9967-1913-55-    Reviewed

 

                    2016 12:00 AM    B12 Injection, Up to 1000 Mcg NDC#2604-4863-75    Reviewed

 

                    2016 12:00 AM    THER/PROPH/DIAG INJ SC/IM    Reviewed

 

                    2012 12:00 AM    THER/PROPH/DIAG INJ SC/IM    Reviewed

 

                    2012 12:00 AM    B12 Injection ([employer]) Ndc#7240-1016-17    Reviewed

 

                    2016 12:00 AM    THER/PROPH/DIAG INJ SC/IM    Reviewed

 

                    2016 12:00 AM    B12 Injection, Up to 1000 Mcg NDC#4475-9506-90    Reviewed

 

                    2016 12:00 AM    B12 Injection, Up to 1000 Mcg NDC#1467-8103-42    Reviewed

 

                    2016 12:00 AM    THER/PROPH/DIAG INJ SC/IM    Reviewed

 

                    2012 12:00 AM    THER/PROPH/DIAG INJ SC/IM    Reviewed

 

                    2012 12:00 AM    B12 Injection([facility]) Ndc#4289-6852-76-    Reviewed

 

                    12/15/2016 12:00 AM    B12 Injection, Up to 1000 Mcg NDC#4975-2162-40    Reviewed

 

                    12/15/2016 12:00 AM    THER/PROPH/DIAG INJ SC/IM    Reviewed

 

                    2016 12:00 AM    URNLS DIP STICK/TABLET RGNT AUTO W/O MICROSCOPY    Reviewed

 

                    1/3/2017 12:00 AM    URNLS DIP STICK/TABLET RGNT AUTO W/O MICROSCOPY    Reviewed

 

                    2017 12:00 AM    URINE CULTURE/COLONY COUNT    Reviewed

 

                    2017 12:00 AM    Rocephin 1 gram Injection, [facility]    Reviewed

 

                    2017 12:00 AM    THERAPEUTIC PROPHYLACTIC/DX INJECTION SUBQ/IM    Reviewed

 

                    2017 12:00 AM    B12 1000mcg Injection, [facility] Medicare    Reviewed

 

                    5/3/2012 12:00 AM    THER/PROPH/DIAG INJ SC/IM    Reviewed

 

                    5/3/2012 12:00 AM    B12 Injection([facility]) Ndc#7805-6643-22-    Reviewed

 

                    2017 12:00 AM    THERAPEUTIC PROPHYLACTIC/DX INJECTION SUBQ/IM    Reviewed

 

                    2017 12:00 AM    B12 1000mcg Injection, [facility] Medicare    Reviewed

 

                    2017 12:00 AM    MRI BRAIN STEM W/O & W/DYE    Reviewed

 

                    2017 12:00 AM    VITAMIN B-12        Reviewed

 

                    2017 12:00 AM    Speech Therapy Consult    Reviewed

 

                    2017 12:00 AM    ASSAY OF CREATININE    Reviewed

 

                    2012 12:00 AM    IMMUNOTHERAPY INJECTIONS    Reviewed

 

                    2012 12:00 AM    B12 Injection([facility]) Ndc#0535-7596-68-    Reviewed

 

                    2017 12:00 AM    URINALYSIS AUTO W/SCOPE    Reviewed

 

                    2012 12:00 AM    THER/PROPH/DIAG INJ SC/IM    Reviewed

 

                    2012 12:00 AM    B12 Injection, Up to 1000 Mcg NDC#6514-1956-22    Reviewed

 

                    2017 12:00 AM    URINALYSIS AUTO W/SCOPE    Reviewed

 

                    2017 2:18 PM    URINALYSIS AUTO W/O SCOPE    Reviewed

 

                    2017 12:00 AM    URINE CULTURE/COLONY COUNT    Reviewed

 

                    2017 12:00 AM    B12 1000mcg Injection    Reviewed

 

                    2017 12:00 AM    URNLS DIP STICK/TABLET RGNT AUTO W/O MICROSCOPY    Reviewed

 

                    2017 12:00 AM    METABOLIC PANEL TOTAL CA    Reviewed

 

                    2017 12:00 AM    URNLS DIP STICK/TABLET RGNT AUTO W/O MICROSCOPY    Reviewed

 

                    2012 12:00 AM    THER/PROPH/DIAG INJ SC/IM    Reviewed

 

                    2012 12:00 AM    B12 Injection, Up to 1000 Mcg NDC#7058-0914-26    Reviewed

 

                    2012 12:00 AM    THER/PROPH/DIAG INJ SC/IM    Reviewed

 

                    2012 12:00 AM    B12 Injection, Up to 1000 Mcg NDC#3071-9587-18    Reviewed

 

                    2017 12:00 AM    ASSAY CARBAMAZEPINE TOTAL    Reviewed

 

                    2017 12:00 AM    AUTOMATED DIFF WBC COUNT    Reviewed

 

                    2017 12:00 AM    COMPREHEN METABOLIC PANEL    Reviewed

 

                    2017 12:00 AM    MANUAL RETICULOCYTE COUNT    Reviewed

 

                    2017 12:00 AM    VITAMIN B-12        Reviewed

 

                    2017 12:00 AM    ASSAY OF FOLIC ACID SERUM    Reviewed

 

                    10/16/2012 12:00 AM    THER/PROPH/DIAG INJ SC/IM    Reviewed

 

                    10/16/2012 12:00 AM    B12 Injection, Up to 1000 Mcg NDC#0150-9634-27    Reviewed

 

                    2010 12:00 AM    COMPREHEN METABOLIC PANEL    Reviewed

 

                    2010 12:00 AM    COMPLETE CBC W/AUTO DIFF WBC    Reviewed

 

                    2010 12:00 AM    LIPID PANEL         Reviewed

 

                    2013 12:00 AM    Flu Injection 3 Years And Above NDC# [address]-54  [facility]    Reviewed



 

                    2013 12:00 AM    COMPLETE CBC W/AUTO DIFF WBC    Reviewed

 

                    2013 12:00 AM    ASSAY OF LITHIUM    Reviewed

 

                    2013 12:00 AM    METABOLIC PANEL TOTAL CA    Reviewed

 

                    4/3/2013 12:00 AM    THER/PROPH/DIAG INJ SC/IM    Reviewed

 

                    4/3/2013 12:00 AM    B12 Injection, Up to 1000 Mcg NDC#4433-3839-81    Reviewed

 

                    2013 12:00 AM    THER/PROPH/DIAG INJ SC/IM    Reviewed

 

                    2013 12:00 AM    B12 Injection, Up to 1000 Mcg NDC#6336-2766-54    Reviewed

 

                    2013 12:00 AM    THER/PROPH/DIAG INJ SC/IM    Reviewed

 

                    2013 12:00 AM    B12 Injection, Up to 1000 Mcg NDC#0955-4252-06    Reviewed

 

                    2013 12:00 AM    LIPID PANEL         Reviewed

 

                    2013 12:00 AM    VITAMIN D 25 HYDROXY    Reviewed

 

                    2013 12:00 AM    THER/PROPH/DIAG INJ SC/IM    Reviewed

 

                    2013 12:00 AM    B12 Injection, Up to 1000 Mcg NDC#7454-9284-76    Reviewed

 

                    2013 12:00 AM    THER/PROPH/DIAG INJ SC/IM    Reviewed

 

                    3/6/2014 12:00 AM    THER/PROPH/DIAG INJ SC/IM    Reviewed

 

                    2014 12:00 AM    THER/PROPH/DIAG INJ SC/IM    Reviewed

 

                    2014 12:00 AM    B12 Injection, Up to 1000 Mcg NDC#3508-2349-38    Reviewed

 

                    2010 12:00 AM    SKIN FUNGI CULTURE    Reviewed

 

                    10/9/2010 12:00 AM    COMPREHEN METABOLIC PANEL    Reviewed

 

                    10/9/2010 12:00 AM    LIPID PANEL         Reviewed

 

                    2010 12:00 AM    THER/PROPH/DIAG INJ SC/IM    Reviewed

 

                    2010 12:00 AM    B12 Injection Ndc#[address]-54 ([facility])    Reviewed

 

                    2010 12:00 AM    THER/PROPH/DIAG INJ SC/IM    Reviewed

 

                    2010 12:00 AM    Kenalog 40 Mg Im-Ndc#[address]-89 ([facility])    Reviewed

 

                    10/15/2010 12:00 AM    FLU VACCINE 3 YRS & > IM    Reviewed

 

                    10/15/2010 12:00 AM    Admin.Of M/C Cov.Vaccine-Flu Vacc.    Reviewed

 

                    1/15/2011 12:00 AM    COMPLETE CBC W/AUTO DIFF WBC    Reviewed

 

                    1/15/2011 12:00 AM    COMPREHEN METABOLIC PANEL    Reviewed

 

                    1/15/2011 12:00 AM    LIPID PANEL         Reviewed

 

                    2014 12:00 AM    MAMMOGRAM SCREENING    Reviewed

 

                    2014 12:00 AM    Screening mammography, bilateral    Reviewed

 

                    7/10/2014 12:00 AM    THER/PROPH/DIAG INJ SC/IM    Reviewed

 

                    7/10/2014 12:00 AM    B12 Injection, Up to 1000 Mcg NDC#8528-1523-76    Reviewed

 

                    2011 12:00 AM    COMPLETE CBC W/AUTO DIFF WBC    Reviewed

 

                    2011 12:00 AM    COMPREHEN METABOLIC PANEL    Reviewed

 

                    2011 12:00 AM    LIPID PANEL         Reviewed

 

                    2014 12:00 AM    B12 Injection, Up to 1000 Mcg NDC#9317-8507-60    Reviewed

 

                    10/19/2014 12:00 AM    MAMMOGRAM SCREENING    Reviewed

 

                    10/19/2014 12:00 AM    Screening mammography, bilateral    Reviewed

 

                    10/16/2014 12:00 AM    B12 Injection, Up to 1000 Mcg NDC#2267-1425-80    Reviewed

 

                    10/16/2014 12:00 AM    COMPLETE CBC W/AUTO DIFF WBC    Reviewed

 

                    10/16/2014 12:00 AM    COMPREHEN METABOLIC PANEL    Reviewed

 

                    10/16/2014 12:00 AM    IMMUNOASSAY TUMOR     Reviewed

 

                    10/16/2014 12:00 AM    LIPID PANEL         Reviewed

 

                    10/16/2014 12:00 AM    ASSAY OF LITHIUM    Reviewed

 

                    10/16/2014 12:00 AM    MAMMOGRAM SCREENING    Reviewed

 

                    2011 12:00 AM    ASSAY OF PARATHORMONE    Reviewed

 

                    2011 12:00 AM    VITAMIN D 25 HYDROXY    Reviewed

 

                    2011 12:00 AM    ASSAY OF LITHIUM    Reviewed

 

                    2011 12:00 AM    METABOLIC PANEL TOTAL CA    Reviewed

 

                    2011 12:00 AM    CT HEAD/BRAIN W/O & W/DYE    Reviewed

 

                    3/23/2015 12:00 AM    PNEUMOCOCCAL VACC 13 GLENDY IM    Reviewed

 

                    3/23/2015 12:00 AM    Vitamin B12 injection    Reviewed

 

                    2011 12:00 AM    ASSAY OF LITHIUM    Reviewed

 

                    2011 12:00 AM    B12 Injection Ndc#[address]-93  [place]    Reviewed

 

                    2015 12:00 AM    THER/PROPH/DIAG INJ SC/IM    Reviewed

 

                    2015 12:00 AM    B12 Injection, Up to 1000 Mcg NDC#9779-4734-10    Reviewed

 

                    2015 12:00 AM    COMPLETE CBC W/AUTO DIFF WBC    Reviewed

 

                    2015 12:00 AM    COMPREHEN METABOLIC PANEL    Reviewed

 

                    2015 12:00 AM    LIPID PANEL         Reviewed

 

                    2015 12:00 AM    ASSAY OF LITHIUM    Reviewed

 

                    2011 12:00 AM    VIT D 1 25-DIHYDROXY    Reviewed

 

                    2011 12:00 AM    VITAMIN B-12        Reviewed

 

                    2015 12:00 AM    B12 Injection, Up to 1000 Mcg NDC#9650-1717-34    Reviewed

 

                    2015 12:00 AM    THER/PROPH/DIAG INJ SC/IM    Reviewed

 

                    2015 12:00 AM    B12 Injection, Up to 1000 Mcg NDC#2950-5757-41    Reviewed

 

                    2011 12:00 AM    THER/PROPH/DIAG INJ SC/IM    Reviewed

 

                    2011 12:00 AM    B12 Injection ([employer]) Ndc#3368-4523-85    Reviewed

 

                    2015 12:00 AM    THER/PROPH/DIAG INJ SC/IM    Reviewed

 

                    2015 12:00 AM    B12 Injection, Up to 1000 Mcg NDC#9521-5762-26    Reviewed







Results Summary







                          Date and Description      Results

 

                          2004 12:00 AM        Colonoscopy-Women and Men over 50 Normal 

 

                          2008 12:00 AM         Pap Smear Declined 

 

                          10/7/2009 12:00 AM        Cholest Cry Stone Ql .0 %LDLc SerPl-mCnc 123.0 mg/dLHDLc

 SerPl-mCnc 34.0 mg/dLTrigl SerPl-mCnc 190.0 mg/dLGlucose SerPl-mCnc 78.0 mg/dL

 

                          2009 12:00 AM        Mammogram -Women over 40 Normal HIV1+2 Ab Ser Ql no risk 

 

                          2010 8:47 AM         Dexa Bone Scan Refused Aspirin reccommended Contraindication 



 

                          2010 8:48 AM         Depression Done 

 

                          2010 12:00 AM         Foot Exam-Diabetic Done 

 

                          2010 12:00 AM         Cholest Cry Stone Ql .0 %LDLc SerPl-mCnc 126.0 mg/dLGlucose

 SerPl-mCnc 102.0 mg/dL

 

                          2010 8:45 AM          TRIGLYCERIDES 122.0 mg/dLCHOLESTEROL 186.0 mg/dLHDL 36.0 mg/dLTOT

 CHOL/HDL 5.2 LDL (CALC) 126.0 mg/dLGLUCOSE 102.0 mg/dLSODIUM 143.0 
mmol/LPOTASSIUM 3.70 mmol/LCHLORIDE 111.0 mmol/LCO2 23.0 mmol/LBUN 10.0 
mg/dLCREATININE 0.80 mg/dLSGOT/AST 12.0 IU/LSGPT/ALT 11.0 IU/LALK PHOS 65.0 
IU/LTOTAL PROTEIN 7.20 g/dLALBUMIN 3.90 g/dLTOTAL BILI 0.50 mg/dLCALCIUM 10.20 
mg/dLAGE 59 GFR NonAA 73 GFR AA 88 eGFR >60 mL/min/1.73 m2eGFR AA* >60 WBC 5.7 
RBC 3.26 HGB 10.60 g/dLHCT 31.70 %MCV 97.0 fLMCH 32.50 pgMCHC 33.40 g/dLRDW SD 
47 RDW CV 13.30 %MPV 9.70 fLPLT 287 NRBC# 0.00 NRBC% 0.0 %NEUT 62.90 %%LYMP 
21.80 %%MONO 9.90 %%EOS 5.0 %%BASO 0.40 %#NEUT 3.56 #LYMP 1.23 #MONO 0.56 #EOS 
0.28 #BASO 0.02 MANUAL DIFF NOT IND 

 

                          2010 12:00 AM        Glucose SerPl-mCnc 96.0 mg/dLCholest Cry Stone Ql .0 %LDLc

 SerPl-mCnc 146.0 mg/dL

 

                          10/6/2010 12:00 AM        Cholest Cry Stone Ql .0 %LDLc SerPl-mCnc 111.0 mg/dLGlucose

 SerPl-mCnc 81.0 mg/dL

 

                          10/6/2010 2:45 PM         TRIGLYCERIDES 123.0 mg/dLCHOLESTEROL 178.0 mg/dLHDL 42.0 mg/dLTOT

 CHOL/HDL 4.2 LDL (CALC) 111.0 mg/dLGLUCOSE 81.0 mg/dLSODIUM 139.0 
mmol/LPOTASSIUM 4.10 mmol/LCHLORIDE 106.0 mmol/LCO2 24.0 mmol/LBUN 13.0 
mg/dLCREATININE 0.90 mg/dLSGOT/AST 13.0 IU/LSGPT/ALT 11.0 IU/LALK PHOS 61.0 
IU/LTOTAL PROTEIN 7.10 g/dLALBUMIN 3.90 g/dLTOTAL BILI 0.30 mg/dLCALCIUM 9.30 
mg/dLAGE 60 GFR NonAA 64 GFR AA 78 eGFR >60 mL/min/1.73 m2eGFR AA* >60 

 

                          2011 12:00 AM         Mammogram -Women over 40 Ordered 

 

                          2011 10:25 AM        TRIGLYCERIDES 111.0 mg/dLCHOLESTEROL 195.0 mg/dLHDL 43.0 mg/dLTOT

 CHOL/HDL 4.5 LDL (CALC) 130.0 mg/dLWBC 5.3 RBC 3.76 HGB 12.0 g/dLHCT 37.80 %MCV
 101.0 fLMCH 31.90 pgMCHC 31.70 g/dLRDW SD 47 RDW CV 13.0 %MPV 9.70 fLPLT 259 
NRBC# 0.00 NRBC% 0.0 %NEUT 69.0 %%LYMP 17.60 %%MONO 8.30 %%EOS 4.70 %%BASO 0.40 
%#NEUT 3.63 #LYMP 0.93 #MONO 0.44 #EOS 0.25 #BASO 0.02 MANUAL DIFF NOT IND 
GLUCOSE 102.0 mg/dLSODIUM 146.0 mmol/LPOTASSIUM 4.20 mmol/LCHLORIDE 113.0 
mmol/LCO2 23.0 mmol/LBUN 15.0 mg/dLCREATININE 1.0 mg/dLSGOT/AST 12.0 
IU/LSGPT/ALT 17.0 IU/LALK PHOS 60.0 IU/LTOTAL PROTEIN 6.90 g/dLALBUMIN 4.20 
g/dLTOTAL BILI 0.40 mg/dLCALCIUM 9.70 mg/dLAGE 60 GFR NonAA 57 GFR AA 69 eGFR 57
 eGFR AA* >60 

 

                          2011 11:49 AM        Cholest Cry Stone Ql .0 %LDLc SerPl-mCnc 130.0 mg/dLHDLc

 SerPl-mCnc 43.0 mg/dLTrigl SerPl-mCnc 111.0 mg/dLGlucose SerPl-mCnc 102.0 mg/dL

 

                          2011 11:52 AM        Pap Smear Declined 

 

                          2011 11:28 AM        Lithium 2.080 mmol/LGLUCOSE 102.0 mg/dLSODIUM 135.0 mmol/LPOTASSIUM

 3.90 mmol/LCHLORIDE 106.0 mmol/LCO2 21.0 mmol/LBUN 12.0 mg/dLCREATININE 1.30 
mg/dLCALCIUM 10.70 mg/dLAGE 60 GFR NonAA 42 GFR AA 51 eGFR 42 eGFR AA* 51 

 

                          2011 8:58 AM          Lithium 0.690 mmol/L

 

                          2011 2:38 PM         VITAMIN B12 3483.0 pg/mL

 

                          2013 3:35 PM          WBC 5.1 RBC 3.73 HGB 11.70 g/dLHCT 36.40 %MCV 98.0 fLMCH 31.40

 pgMCHC 32.10 g/dLRDW SD 47 RDW CV 13.10 %MPV 9.80 fLPLT 224 NRBC# 0.00 NRBC% 
0.0 %NEUT 66.80 %%LYMP 19.10 %%MONO 9.0 %%EOS 4.90 %%BASO 0.20 %#NEUT 3.42 #LYMP
 0.98 #MONO 0.46 #EOS 0.25 #BASO 0.01 MANUAL DIFF NOT IND GLUCOSE 88.0 
mg/dLSODIUM 141.0 mmol/LPOTASSIUM 4.10 mmol/LCHLORIDE 110.0 mmol/LCO2 22.0 
mmol/LBUN 22.0 mg/dLCREATININE 1.10 mg/dLCALCIUM 9.80 mg/dLAGE 62 GFR NonAA 50 
GFR AA 61 eGFR 50 eGFR AA* 60 Lithium 0.760 mmol/L

 

                          2013 11:02 AM        TRIGLYCERIDES 106.0 mg/dLCHOLESTEROL 181.0 mg/dLHDL 46.0 mg/dLTOT

 CHOL/HDL 3.9 LDL (CALC) 114.0 mg/dLVITAMIN D 41.10 ng/mL

 

                          10/17/2014 10:10 AM       WBC 5.0 RBC 3.66 HGB 11.60 g/dLHCT 36.80 %.0 fLMCH 31.70

 pgMCHC 31.50 g/dLRDW SD 50 RDW CV 13.50 %MPV 10.10 fLPLT 209 NRBC# 0.00 NRBC% 
0.0 %NEUT 69.20 %%LYMP 21.0 %%MONO 6.40 %%EOS 3.20 %%BASO 0.20 %#NEUT 3.46 #LYMP
 1.05 #MONO 0.32 #EOS 0.16 #BASO 0.01 MANUAL DIFF NOT IND GLUCOSE 100.0 
mg/dLSODIUM 148.0 mmol/LPOTASSIUM 3.90 mmol/LCHLORIDE 114.0 mmol/LCO2 26.0 
mmol/LBUN 12.0 mg/dLCREATININE 1.20 mg/dLSGOT/AST 9.0 IU/LSGPT/ALT <6 IU/LALK 
PHOS 82.0 IU/LTOTAL PROTEIN 6.90 g/dLALBUMIN 4.0 g/dLTOTAL BILI 0.40 
mg/dLCALCIUM 10.50 mg/dLAGE 64 GFR NonAA 45 GFR AA 55 eGFR 45 eGFR AA* 55 
TRIGLYCERIDES 96.0 mg/dLCHOLESTEROL 155.0 mg/dLHDL 38.0 mg/dLTOT CHOL/HDL 4.1 
LDL (CALC) 98.0 mg/dLLithium 0.850 mmol/LCancer Antigen (CA) 125 8.30 U/mL

 

                          2015 10:25 AM        Lithium 0.790 mmol/LWBC 4.8 RBC 3.44 HGB 11.0 g/dLHCT 35.20 

%.0 fLMCH 32.0 pgMCHC 31.30 g/dLRDW SD 53 RDW CV 14.0 %MPV 9.30 fLPLT 210
 NRBC# 0.00 NRBC% 0.0 %NEUT 70.80 %%LYMP 17.20 %%MONO 8.10 %%EOS 3.50 %%BASO 
0.40 %#NEUT 3.41 #LYMP 0.83 #MONO 0.39 #EOS 0.17 #BASO 0.02 MANUAL DIFF NOT IND 
TRIGLYCERIDES 107.0 mg/dLCHOLESTEROL 174.0 mg/dLHDL 43.0 mg/dLTOT CHOL/HDL 4.0 
LDL (CALC) 110.0 mg/dLGLUCOSE 90.0 mg/dLSODIUM 145.0 mmol/LPOTASSIUM 3.80 
mmol/LCHLORIDE 115.0 mmol/LCO2 24.0 mmol/LBUN 17.0 mg/dLCREATININE 1.30 
mg/dLSGOT/AST 18.0 IU/LSGPT/ALT 17.0 IU/LALK PHOS 56.0 IU/LTOTAL PROTEIN 6.70 
g/dLALBUMIN 3.90 g/dLTOTAL BILI 0.40 mg/dLCALCIUM 9.80 mg/dLAGE 64 GFR NonAA 41 
GFR AA 50 eGFR 41 eGFR AA* 50 

 

                          3/15/2016 8:08 AM         VITAMIN B12 696.0 pg/mL

 

                          2016 2:09 PM        COLOR YELLOW APPEARANCE CLEAR SPEC GRAV 1.010 pH 5.0 PROTEIN

 30 GLUCOSE NEGATIVE mg/dLKETONE NEGATIVE BILIRUBIN NEGATIVE BLOOD LARGE NITRITE
 NEGATIVE [place] SCREEN MODERATE MICRO INDICATED? SEE BELOW WBC/HPF  RBC/HPF
 20-50 CASTS/LPF NEGATIVE /LPFCRYSTALS NEGATIVE MUCOUS THRDS NEGATIVE BACTERIA 
NEGATIVE EPITH CELLS FEW SQUAMOUS /HPFTRICHOMONAS NEGATIVE YEAST NEGATIVE CULT 
SET UP? YES 

 

                          1/3/2017 4:08 PM          COLOR YELLOW APPEARANCE HAZY SPEC GRAV 1.015 pH 6.0 PROTEIN 30

 GLUCOSE NEGATIVE mg/dLKETONE NEGATIVE BILIRUBIN NEGATIVE BLOOD MODERATE NITRITE
 NEGATIVE [place] SCREEN LARGE MICRO INDICATED? SEE BELOW WBC/-200 RBC/HPF 
5-10 CASTS/LPF NEGATIVE /LPFCRYSTALS NEGATIVE MUCOUS THRDS NEGATIVE BACTERIA 
NEGATIVE EPITH CELLS 1+ SQUAMOUS /HPFTRICHOMONAS NEGATIVE YEAST NEGATIVE CULT 
SET UP? YES 

 

                          2017 4:24 PM         CREATININE 1.50 mg/dLAGE 66 GFR NonAA 35 GFR AA 42 eGFR 35 eGFR

 AA* 42 VITAMIN B12 1324.0 pg/mL

 

                          2017 2:45 PM         COLOR YELLOW APPEARANCE CLEAR SPEC GRAV <=1.005 pH 6.0 PROTEIN

 NEGATIVE GLUCOSE NEGATIVE mg/dLKETONE NEGATIVE BILIRUBIN NEGATIVE BLOOD TRACE-
INTACT NITRITE NEGATIVE [place] SCREEN SMALL MICRO INDICATED? SEE BELOW WBC/HPF 0-5
 RBC/HPF NEGATIVE CASTS/LPF NEGATIVE /LPFCRYSTALS NEGATIVE MUCOUS THRDS NEGATIVE
 BACTERIA FEW EPITH CELLS FEW SQUAMOUS /HPFTRICHOMONAS NEGATIVE YEAST NEGATIVE 
CULT SET UP? YES 

 

                          2017 11:22 AM        COLOR YELLOW APPEARANCE CLEAR SPEC GRAV <=1.005 pH 6.5 PROTEIN

 NEGATIVE GLUCOSE NEGATIVE mg/dLKETONE NEGATIVE BILIRUBIN NEGATIVE BLOOD 
NEGATIVE NITRITE NEGATIVE [place] SCREEN NEGATIVE MICRO INDICATED? NOT INDICATED 

 

                          2017 2:18 PM         Clarity Ur cloudy Color Ur dark yellow Glucose Ur-sCnc negative

 Bilirub Ur Ql Strip negative Ketones Ur Ql Strip negative Sp Gr Ur Qn 1.010 Hgb
 Ur Ql Strip trace-intact pH Ur-LsCnc 6.5 Prot Ur Ql Strip trace Urobilinogen 
Ur-mCnc 0.2 Nitrite Ur Ql Strip negative WBC Est Ur Ql Strip large 

 

                          2017 9:28 AM         GLUCOSE 109.0 mg/dLSODIUM 142.0 mmol/LPOTASSIUM 3.60 mmol/LCHLORIDE

 106.0 mmol/LCO2 23.0 mmol/LBUN 11.0 mg/dLCREATININE 1.30 mg/dLCALCIUM 9.80 
mg/dLAGE 66 GFR NonAA 41 GFR AA 50 eGFR 41 eGFR AA* 50 

 

                          2017 9:52 AM         COLOR YELLOW APPEARANCE CLOUDY SPEC GRAV <=1.005 pH 6.5 PROTEIN

 NEGATIVE GLUCOSE NEGATIVE mg/dLKETONE NEGATIVE BILIRUBIN NEGATIVE BLOOD SMALL 
NITRITE POSITIVE [place] SCREEN LARGE MICRO INDICATED? SEE BELOW WBC/-300 
RBC/HPF 5-10 CASTS/LPF NEGATIVE /LPFCRYSTALS NEGATIVE MUCOUS THRDS NEGATIVE 
BACTERIA 2++ EPITH CELLS 1+ SQUAMOUS /HPFTRICHOMONAS NEGATIVE YEAST NEGATIVE 
CULT SET UP? YES 

 

                          2017 10:41 AM         COLOR YELLOW APPEARANCE CLEAR SPEC GRAV <=1.005 pH 6.0 PROTEIN

 NEGATIVE GLUCOSE NEGATIVE mg/dLKETONE NEGATIVE BILIRUBIN NEGATIVE BLOOD 
NEGATIVE NITRITE NEGATIVE [place] SCREEN TRACE MICRO INDICATED? SEE BELOW WBC/HPF 
0-5 RBC/HPF RARE CASTS/LPF NEGATIVE /LPFCRYSTALS NEGATIVE MUCOUS THRDS NEGATIVE 
BACTERIA FEW EPITH CELLS 1+ SQUAMOUS /HPFTRICHOMONAS NEGATIVE YEAST NEGATIVE 
CULT SET UP? NO 

 

                          2017 5:10 AM          GLUCOSE 77.0 mg/dLSODIUM 144.0 mmol/LPOTASSIUM 3.80 mmol/LCHLORIDE

 113.0 mmol/LCO2 24.0 mmol/LBUN 11.0 mg/dLCREATININE 1.0 mg/dLSGOT/AST 14.0 
IU/LSGPT/ALT 11.0 IU/LALK PHOS 68.0 IU/LTOTAL PROTEIN 5.0 g/dLALBUMIN 3.40 
g/dLTOTAL BILI 0.30 mg/dLCALCIUM 8.80 mg/dLAGE 66 GFR NonAA 55 GFR AA 67 eGFR 55
 eGFR AA* >60 CARBAMAZEPINE 4.70 ug/mLWBC 3.3 RBC 3.37 HGB 11.10 g/dLHCT 33.80 
%.0 fLMCH 32.90 pgMCHC 32.80 g/dLRDW SD 47 RDW CV 12.80 %MPV 10.0 fLPLT 
184 NRBC# 0.00 NRBC% 0.0 %NEUT 46.80 %%LYMP 38.50 %%MONO 10.10 %%EOS 4.0 %%BASO 
0.30 %#NEUT 1.53 #LYMP 1.26 #MONO 0.33 #EOS 0.13 #BASO 0.01 MANUAL DIFF NOT IND 
RETIC % 1.180 %RETIC # 3.98 IRF 13.4 VITAMIN B12 242.0 pg/mLFOLATE 8.50 ng/mL







History Of Immunizations







       Name    Date Admin    Mfg Name    Mfg Code    Trade Name    Lot#    Route    Inj    Vis Given    Vis

 Pub                                    CVX

 

        Influenza    2008    Not Entered    NE      Not Entered            Not Entered    Not Entered

                    1            999

 

        X       12/19/2008    [employer]    MSD     PNEUMOVAX 23            Intramuscular    Not Entered

                    1            999

 

           Influenza    10/15/2010    [employer]    NOV        Fluvirin > 12 Years    

[id number]     Intramuscular    Left Deltoid    10/15/2010    2009    999

 

          X         3/23/2015    [facility]    [facility]       PREVNAR 13    [id number]    Intramuscular

                Right Gluteous Medius    3/23/2015       2013       109







History of Past Illness







                    Name                Date of Onset       Comments

 

                    Peritoneal Neoplasm, Malignant                         

 

                    Hyperlipidemia                           

 

                    Bipolar disorder, unspecified                         

 

                    Artificial opening status; colostomy                         

 

                    B12 deficiency                           

 

                    Anemia, Pernicious                         

 

                    Arthritis unspecified                         

 

                    cervical cancer                          

 

                    Artificial opening status; colostomy    2010  1:10PM     

 

                    Bipolar disorder, unspecified    2010  1:10PM     

 

                    Hyperlipidemia      2010  1:10PM     

 

                    Anemia, Pernicious    2010  1:10PM     

 

                    Postoperative Follow-Up    2010  1:55PM     

 

                    Postoperative Follow-Up    Mar  8 2010 10:57AM     

 

                    Artificial opening status; colostomy    Mar  8 2010  1:19PM     

 

                    Peritoneal Neoplasm, Malignant    Mar  8 2010  1:19PM     

 

                    Artificial opening status; colostomy    2010  1:40PM     

 

                    Hyperlipidemia      2010  1:40PM     

 

                    Anemia, Pernicious    2010  1:40PM     

 

                    Peritoneal Neoplasm, Malignant    2010  1:40PM     

 

                    Arthritis unspecified    2010  1:40PM     

 

                    Anemia of Chronic Illness    2010  1:40PM     

 

                    Tinea corporis      2010  3:17PM     

 

                    Bipolar disorder, unspecified    2010  1:33PM     

 

                    Hyperlipidemia      2010  1:33PM     

 

                    Anemia, Pernicious    2010  1:33PM     

 

                    Peritoneal Neoplasm, Malignant    2010  1:33PM     

 

                    B12 deficiency      2010  1:33PM     

 

                    Ethmoidal Sinusitis, Acute    Sep 21 2010  3:53PM     

 

                    Wheezing            Sep 21 2010  3:53PM     

 

                    Flu                 Oct 15 2010  1:40PM     

 

                    Bipolar disorder, unspecified    Oct 15 2010  1:42PM     

 

                    Hyperlipidemia      Oct 15 2010  1:42PM     

 

                    Anemia, Pernicious    Oct 15 2010  1:42PM     

 

                    Peritoneal Neoplasm, Malignant    Oct 15 2010  1:42PM     

 

                    Bipolar disorder, unspecified    2011 12:01PM     

 

                    Hyperlipidemia      2011 12:01PM     

 

                    Anemia, Pernicious    2011 12:01PM     

 

                    Peritoneal Neoplasm, Malignant    2011 12:01PM     

 

                    Bipolar disorder, unspecified    Apr 15 2011 10:55AM     

 

                    Major Depression    2011 10:11AM     

 

                    Bipolar Disorder    2011 10:11AM     

 

                    Cancer              May 10 2011  4:16PM     

 

                    Major Depression    May 10 2011  3:16PM     

 

                    Bipolar Disorder    May 10 2011  3:16PM     

 

                    Hypercalcemia       May 23 2011  2:47PM     

 

                    Bipolar disorder, unspecified    May 23 2011  2:47PM     

 

                    Colon Cancer, Personal History    May 23 2011  2:47PM     

 

                    Bipolar Disorder    May 31 2011  4:39PM     

 

                    Depressive Disorder    2011 10:01AM     

 

                    Vitamin B12 deficiency    2011 10:01AM     

 

                    Vitamin D Deficiency    2011  5:07PM     

 

                    Anemia, Vitamin B12 Deficiency    2011  5:07PM     

 

                    B12 deficiency      2011  3:56PM     

 

                    Routine gynecological examination    Aug  4 2011  9:08AM     

 

                    Screening Examination for Breast Cancer    Aug  4 2011  9:08AM     

 

                    Tinea Corporis      Aug  4 2011  9:08AM     

 

                    Depressive Disorder    Sep 23 2011  8:47AM     

 

                    Contact Dermatitis    Sep 23 2011  8:47AM     

 

                    Anemia, Pernicious    Sep 23 2011  8:47AM     

 

                    B12 deficiency      Sep 23 2011  8:47AM     

 

                    B12 deficiency      Sep 27 2011  2:58PM     

 

                    Renal failure, unspecified                         

 

                    Gout                                     

 

                    Alzheimer disease                         

 

                    dementia                                 

 

                    Hyperlipidemia                           

 

                    History of colon cancer                         

 

                    B12 deficiency      Oct 20 2011  2:34PM     

 

                    Flu                 Dec  9 2011  3:16PM     

 

                    B12 deficiency      Dec  9 2011  3:17PM     

 

                    B12 deficiency      2012  4:52PM     

 

                    B12 deficiency      Feb 2012 11:10AM     

 

                    B12 deficiency      2012  3:37PM     

 

                    B12 deficiency      May  3 2012  4:10PM     

 

                    B12 deficiency      2012  2:54PM     

 

                    B12 deficiency      2012 11:23AM     

 

                    B12 deficiency      Aug  9 2012  2:08PM     

 

                    B12 deficiency      Sep  6 2012  4:36PM     

 

                    B12 deficiency      Oct 16 2012 10:23AM     

 

                    Flu                 Feb  4   3:11PM     

 

                    Bipolar disorder, unspecified    Feb  2013  2:48PM     

 

                    Anemia, Pernicious    Feb  4   2:48PM     

 

                    B12 deficiency      Feb  4   2:48PM     

 

                    Extrapyramidal abnormal movement disorder    Feb  4   2:48PM     

 

                    B12 deficiency      Apr  3 2013 12:03PM     

 

                    Bipolar disorder, unspecified    May  7 2013  1:31PM     

 

                    Anemia, Pernicious    May  7 2013  1:31PM     

 

                    B12 deficiency      May  7 2013  1:31PM     

 

                    Extrapyramidal abnormal movement disorder    May  7 2013  1:31PM     

 

                    B12 deficiency      2013  3:42PM     

 

                    B12 deficiency      2013  1:31PM     

 

                    Hyperlipidemia      Aug  7 2013 10:37AM     

 

                    Vitamin D Deficiency    Aug  7 2013 10:37AM     

 

                    Bipolar disorder, unspecified    Aug  7 2013 10:37AM     

 

                    Anemia, Pernicious    Aug  7 2013 10:37AM     

 

                    B12 deficiency      Aug  7 2013 10:37AM     

 

                    B12 deficiency      Sep 25 2013 11:15AM     

 

                    B12 deficiency      Dec 11 2013  3:16PM     

 

                    B12 deficiency      Mar  6 2014  1:48PM     

 

                    B12 deficiency      May 21 2014  3:17PM     

 

                    Screening Examination for Breast Cancer    2014  3:23PM     

 

                    Periumbilical abdominal pain    2014  3:23PM     

 

                    B12 deficiency      Jul 10 2014  2:52PM     

 

                    Anemia, Vitamin B12 Deficiency    Aug 13 2014  4:50PM     

 

                    Bipolar disorder    Oct 16 2014 11:13AM     

 

                    Hyperlipidemia      Oct 16 2014 11:13AM     

 

                    Anemia, Pernicious    Oct 16 2014 11:13AM     

 

                    Peritoneal Neoplasm, Malignant    Oct 16 2014 11:13AM     

 

                    Screening breast examination    Oct 16 2014 11:13AM     

 

                    Weight loss         Oct 16 2014 11:13AM     

 

                    Anemia, Pernicious    Mar 23 2015  2:57PM     

 

                    B12 deficiency      Mar 23 2015  2:57PM     

 

                    Need for Prevnar vaccine    Mar 23 2015  2:57PM     

 

                    Bipolar disorder    Mar 23 2015  2:57PM     

 

                    Hyperlipidemia      Mar 23 2015  2:57PM     

 

                    Anemia, Pernicious    Mar 23 2015  2:57PM     

 

                    Peritoneal Neoplasm, Malignant    Mar 23 2015  2:57PM     

 

                    B12 deficiency      May  4 2015  4:48PM     

 

                    Hyperlipidemia      May 13 2015  9:56AM     

 

                    Anemia              May 13 2015  9:56AM     

 

                    Bipolar disorder    May 13 2015  9:56AM     

 

                    Bipolar disorder    May 14 2015  3:27PM     

 

                    Hyperlipidemia      May 14 2015  3:27PM     

 

                    Anemia, Pernicious    May 14 2015  3:27PM     

 

                    Peritoneal Neoplasm, Malignant    May 14 2015  3:27PM     

 

                    B12 deficiency      2015  2:20PM     

 

                    B12 deficiency      2015 11:34AM     

 

                    B12 deficiency      Aug 18 2015  9:06AM     

 

                    Tinea Corporis      Sep 18 2015  8:54AM     

 

                    B12 deficiency      Sep 18 2015  8:54AM     

 

                    B12 deficiency      2015 10:28AM     

 

                    Herpes zoster without complication    Dec  3 2015  9:52AM     

 

                    B12 deficiency      Dec 23 2015 11:21AM     

 

                    B12 deficiency      2016  4:51PM     

 

                    Vitamin B 12 deficiency    Mar 14 2016  5:35PM     

 

                    B12 deficiency      Mar 15 2016 12:14PM     

 

                    B12 deficiency      May  5 2016 11:30AM     

 

                    Edema               May  5 2016 11:30AM     

 

                    Dermatitis          May  5 2016 11:30AM     

 

                    Edema               May 17 2016  8:38AM     

 

                    Shortness of breath    May 17 2016  8:38AM     

 

                    Bilateral edema of lower extremity    2016  2:06PM     

 

                    B12 deficiency      2016  2:06PM     

 

                    B12 deficiency      2016 11:50AM     

 

                    B12 deficiency      2016 11:20AM     

 

                    Diarrhea            Aug  2 2016  3:13PM     

 

                    B12 deficiency      Aug 24 2016 11:10AM     

 

                    Encounter for screening mammogram for breast cancer    Aug 24 2016 11:44AM     

 

                    B12 deficiency      Sep 28 2016  2:35PM     

 

                    B12 deficiency      Dec 15 2016  2:02PM     

 

                    Dysuria             Dec 29 2016 12:14PM     

 

                    Hematuria           Fidencio  3 2017  1:33PM     

 

                    Constipation by delayed colonic transit    2017  1:52PM     

 

                    Ileus               2017  1:52PM     

 

                    UTI (urinary tract infection)    Fidencio 15 2017  3:39PM     

 

                    Acute cystitis with hematuria    2017 11:07AM     

 

                    B12 deficiency      2017 11:07AM     

 

                    B12 deficiency      2017 11:40AM     

 

                    B12 deficiency      2017  4:07PM     

 

                    Slurred speech      2017  3:07PM     

 

                    Vitamin B12 deficiency    2017  3:07PM     

 

                    Dysphagia, unspecified type    2017  3:07PM     

 

                    Hyperlipidemia      2017  3:07PM     

 

                    Dysuria             2017 12:01PM     

 

                    B12 deficiency      2017  2:08PM     

 

                    Dysuria             2017 10:58AM     

 

                    Hematuria           May 22 2017  1:36PM     

 

                    Depressive Disorder    May 22 2017  1:36PM     

 

                    Constipation        May 22 2017  1:36PM     

 

                    Fatigue             May 22 2017  1:36PM     

 

                    Urinary tract infection    May 30 2017  9:36AM     

 

                    Hypokalemia         May 30 2017 12:03PM     

 

                    Urinary tract infection    2017  4:36PM     

 

                    Bipolar disorder, unspecified    Aug 23 2017 11:05AM     

 

                    Anemia, Pernicious    Aug 23 2017 11:05AM     

 

                    B12 deficiency      Aug 23 2017 11:05AM     

 

                    Gingivitis          May 17 2018  2:38PM     

 

                    Colostomy complication    May 17 2018  2:38PM     







Payers







           Insurance Name    Company Name    Plan Name    Plan Number    Policy Number    Policy Group

 Number                                 Start Date

 

                    Medicare RHC Medicare RHC              [id number]              N/A

 

                          [employer]    [employer]                 [id number]

                                                    

 

                                [facility]      

                    [id number]                             N/A

 

                    Medicare Part A    Medicare - Lab/Xray              [id number]              2006

 

                    Medicare Part B    Medicare [place]              [id number]              2006

 

                          [employer]    [employer]                 50 percent

                                                    2009

 

                    [employer]    [employer]              [id number]              N/A

 

                    Medicare Part A    Medicare Part A              [id number]              N/A

 

                    Medicare Part A    Medicare Part A              [id number]              2006









History of Encounters







                    Visit Date          Visit Type          Provider

 

                    2018           Office visit        Bhupinder Louise DO

 

                    2017           Hospital            Pranav Angel MD

 

                    2017           [facility]            EARNEST Lopez MD

 

                    2017           Office visit        Bhupinder Aspen DO

 

                    2017           Nurse visit         Bhupinder Aspen DO

 

                    2017           Office visit         

 

                    2017           Office visit        Bhupinder Aspen DO

 

                    2017           Nurse visit         Bhupinder Aspen DO

 

                    2017           Office visit        Radha Ontiveros APRN

 

                    1/15/2017           Office visit        Aj Tapia NP

 

                    2017            Office visit        Devin Masterson MD

 

                    2016          [facility]            Devin Masterson MD

 

                    12/15/2016          Nurse visit         Bhupinder Aspen DO

 

                    2016           Nurse visit         Bret Forte APRN

 

                    2016           Nurse visit         Bhupinder Aspen DO

 

                    2016            Office visit        Bhupinder Aspen DO

 

                    2016           Nurse visit         Bhupinder Aspen DO

 

                    2016           Office visit        Bret Forte APRN

 

                    2016            Office visit        Bhupinder Aspen DO

 

                    3/15/2016           Nurse visit         Bhupinder Aspen DO

 

                    2016            Nurse visit         Bhupinder Aspen DO

 

                    2015          Nurse visit         Bhupinder Aspen DO

 

                    12/3/2015           Office visit        Bhupinder Aspen DO

 

                    2015          Nurse visit         Bhupinder Aspen DO

 

                    2015           Office visit        Bhupinder Asepn DO

 

                    2015           Nurse visit         Bhupinder Aspen DO

 

                    2015            Nurse visit         Bhupinder Aspen DO

 

                    2015            Nurse visit         Bhupinder Aspen DO

 

                    2015           Office visit        Bhupinder Aspen DO

 

                    2015            Nurse visit         Bhupinder Aspen DO

 

                    3/23/2015           Office visit        Bhupinder Aspen DO

 

                    10/16/2014          Office visit        Bhupinder Aspen DO

 

                    2014           Nurse visit         Radha Ontiveros APRN

 

                    7/10/2014           Nurse visit         Bhupinder Aspen DO

 

                    2014           Office visit        Bhupinder Aspen DO

 

                    2014           Nurse visit         Bhupinder Aspen DO

 

                    3/6/2014            Nurse visit         Bhupinder Aspen DO

 

                    2014            [facility]            EARNEST Lopez MD

 

                    2013          Nurse visit         Bhupinder Aspen DO

 

                    2013           Nurse visit         Bhupinder Aspen DO

 

                    2013            Office visit        Bhupinder Aspen DO

 

                    2013            Nurse visit         Bhupinder Aspen DO

 

                    2013            Nurse visit         Bhupinder Aspen DO

 

                    2013            Office visit        Bhupinder Aspen DO

 

                    4/3/2013            Nurse visit         Bhupinder Aspen DO

 

                    2013            Office visit        Bhupinder Aspen DO

 

                    10/16/2012          Nurse visit         Bhupinder Aspen DO

 

                    2012            Nurse visit         Bhupinder Aspen DO

 

                    2012            Voided              Bhupinder Aspen DO

 

                    2012            Nurse visit         Bhupinder Aspen DO

 

                    2012            Nurse visit         Bhupinder Aspen DO

 

                    2012           Nurse visit         Bhupinder Aspen DO

 

                    5/3/2012            Nurse visit         Bhupinder Aspen DO

 

                    2012           Nurse visit         Bhupinder Aspen DO

 

                    2012           Nurse visit         Bhupinder Aspen DO

 

                    2012           Nurse visit         Bhupinder Aspen DO

 

                    2011           Nurse visit         Bhupinder Aspen DO

 

                    10/20/2011          Nurse visit         Bhupinder Aspen DO

 

                    2011           Office visit        Bhupinder Aspen DO

 

                    2011           Nurse visit         Radha GREEN

 

                    2011            Office visit        Bhupinder Aspen DO

 

                    2011           Nurse visit         Bhupinder Aspen DO

 

                    2011            Office visit        Bhupinder Aspen DO

 

                    2011           Office visit        Bhupinder Aspen DO

 

                    5/10/2011           Office visit        Bhupinder Aspen DO

 

                    2011           Office visit        Bhupinder Aspen DO

 

                    4/15/2011           Office visit        Devin Angel DO

 

                    2011           Office visit        Devin Angel DO

 

                    10/15/2010          Office visit        Devin Angel DO

 

                    2010           Office visit        Devin Angel DO

 

                    2010            Office visit        Devin Angel DO

 

                    2010           Office visit        Devin Angel DO

 

                    2010            Office visit        Devin Angel DO

 

                    3/8/2010            Office visit        Devin Masterson MD

 

                    2010            Surgery             Devin Masterson MD

 

                    2010            Office visit        Devin Angel DO

 

                    2010           Surgery             Devin Masterson MD

 

                    2010           Hospital            Devin Masterson MD

 

                    2010           Hospital            Devin Masterson MD

 

                    10/22/2009          Office visit        Devin Anegl DO

## 2019-06-26 NOTE — XMS REPORT
MU2 Ambulatory Summary

                             Created on: 2017



Pauline Gan

External Reference #: [id number]

: 1950

Sex: Female



Demographics







                          Address                   [address]

[place]  [id number]

 

                          Home Phone                [phone]

 

                          Preferred Language        English

 

                          Marital Status            Legally 

 

                          Restorationism Affiliation     Unknown

 

                          Race                      White

 

                          Ethnic Group              Not  or 





Author







                          Pranav Washington

 

                          Organization              [facility]

 

                          Address                   [address]

[place]  [phone]



 

                          Phone                     [phone]







Care Team Providers







                    Care Team Member Name    Role                Phone

 

                    Pranav Angel     PCP                 Unavailable

 

                    Bhupinder Louise    PreferredProvider    Unavailable







Allergies and Adverse Reactions







                    Name                Reaction            Notes

 

                    NO KNOWN DRUG ALLERGIES                         







Plan of Treatment







             Planned Activity    Comments     Planned Date    Planned Time    Plan/Goal

 

             Injection,Subcutaneous/Intramuscul, [facility]                 2017    12:00 AM      

 

             Carbamazepine level                 2017     12:00 AM      

 

             CBC W/ AUTO DIFF (RFLX MAN DIFF IF IND).                 2017     12:00 AM      

 

             CMP                       2017     12:00 AM      

 

             Retic count                 2017     12:00 AM      

 

             VITAMIN B12                 2017     12:00 AM      

 

             Folate measurement                 2017     12:00 AM      







Medications







                                        Active 

 

             Name         Start Date    Estimated Completion Date    SIG          Comments

 

                Latuda 20 mg oral tablet                                    take 1 tablet (20 mg) by oral route once daily with

 food (at least 350 calories)            

 

             pravastatin 40 mg oral tablet    3/30/2015                 TAKE 1 TABLET BY MOUTH DAILY     

 

                Namenda XR 28 mg oral capsule,sprinkle,ER 24hr    2015                       take 1 capsule (28

 mg) by oral route once daily            

 

                Namenda XR 28 mg oral capsule,sprinkle,ER 24hr    2016                       take 1 capsule (28

 mg) by oral route once daily            

 

                potassium chloride 10 mEq oral tablet extended release    2016                       take 1 tablet

 (10 meq) by oral route once daily       

 

             pravastatin 40 mg oral tablet    2016                 TAKE 1 TABLET BY MOUTH DAILY     

 

                Vitamin B-12 1,000 mcg/mL injection solution    2016                       inject 1 milliliter 

(1,000 mcg) by intramuscular route once a month     

 

                potassium chloride 10 mEq oral tablet extended release    2016                      take 1 tablet

 (10 meq) by oral route once daily       

 

                Namenda XR 28 mg oral capsule,sprinkle,ER 24hr    2016                      TAKE 1 CAPSULE BY

 MOUTH EVERY DAY                         

 

                furosemide 40 mg oral tablet    2016                      take 1 tablet (40 mg) by oral route

 once daily                              

 

                mirtazapine 45 mg oral tablet                                    take 1 tablet (45 mg) by oral route once daily

 before bedtime                          

 

             Fish Oil 300-1,000 mg oral capsule                              take 1 capsule by oral route daily     

 

             Vitamin D3 1,000 unit oral tablet                              take 1 tablet by oral route daily     

 

                Calcium 600 600 mg calcium (1,500 mg) oral tablet                                    take 1 tablet by oral route

 daily                                   

 

                Aspirin Low Dose 81 mg oral tablet,delayed release (DR/EC)                                    take 1 tablet 

(81 mg) by oral route once daily         

 

                metoclopramide HCl 10 mg oral tablet    2017                       take 1 tablet by oral route 

2 times a day for 50 days                

 

             furosemide 40 mg oral tablet    2017                 TAKE 1 TABLET BY MOUTH DAILY     

 

                Namenda XR 28 mg oral capsule,sprinkle,ER 24hr    2017                       TAKE 1 CAPSULE BY 

MOUTH EVERY DAY                          

 

                Linzess 72 mcg oral capsule    2017                       take 1 capsule (72 mcg) by oral route

 once daily on an empty stomach at least 30 minutes before 1st meal of the day     



 

             pravastatin 40 mg oral tablet    2017                 TAKE 1 TABLET BY MOUTH DAILY     

 

                metoclopramide HCl 10 mg oral tablet    2017                       TAKE 1 TABLET BY ORAL ROUTE 

2 TIMES A DAY FOR 50 DAYS                

 

                potassium chloride 10 mEq oral tablet extended release    2017                       TAKE 2 TABLETs

 BY MOUTH twice DAILY for one week       

 

                potassium chloride 10 mEq oral tablet extended release    2017                       TAKE 2 TABLETs

 BY MOUTH twice DAILY for one week       

 

             simvastatin 20 mg oral tablet    2017                 TAKE 1 TABLET BY MOUTH AT BEDTIME    

 

 

             famotidine 20 mg oral tablet    2017                 TAKE 1 TABLET BY MOUTH TWICE DAILY    

 

 

             memantine 10 mg oral tablet    2017                 TAKE 1 TABLET BY MOUTH TWICE DAILY     



 

                rivastigmine tartrate 1.5 mg oral capsule    2017                       TAKE 1 CAPSULE BY MOUTH

 TWICE DAILY BEFORE MEALS                

 

             famotidine 20 mg oral tablet    2017                 TAKE 1 TABLET BY MOUTH TWICE DAILY    

 

 

                carbamazepine 200 mg oral tablet    2017                       TAKE 1 TABLET BY MOUTH BEFORE BREAKFAST

 AND TAKE 2 TABLETS AT BEDTIME           

 

                carbamazepine 200 mg oral tablet    2017                       TAKE 1 TABLET BY MOUTH BEFORE BREAKFAST

 AND TAKE 2 TABLETS AT BEDTIME           

 

                aspirin 81 mg oral tablet,delayed release (DR/EC)    2017                       TAKE 1 TABLET BY

 MOUTH EVERY DAY                         

 

             BISACODYL 5MG PV (BOX OF 25)    2017                 TAKE 1 TABLET BY MOUTH EVERY DAY     

 

             MULTI-VITAMINS    2017                 TAKE 1 TABLET BY MOUTH EVERY DAY     









                                         

 

             Name         Start Date    Expiration Date    SIG          Comments

 

             Reglan 10mg    3/29/2010    2010    one ac and hs     

 

                Keflex 500 mg oral capsule    2010       10/1/2010       take 1 capsule (500 mg) by oral

 route every 6 hours for 10 days         

 

                Bactrim -160 mg oral tablet    2011       take 1 tablet by oral route

 every 12 hours for 7 days               

 

                triamcinolone acetonide 0.1 % topical cream    2011      apply a thin

 layer to the affected area(s) by topical route 2 times per day     

 

                sertraline 100 mg oral tablet    4/10/2012       5/10/2012       take 1.5 tablets by oral route

 daily for 30 days                       

 

                ergocalciferol (vitamin D2) 50,000 unit oral capsule    4/15/2013       2013       TAKE

 ONE CAPSULE BY MOUTH ONCE A WEEK        

 

                CYANOCOBALAM 1000MCGINJ 1000 milliliter    2013       INJECT 1ML INTRAMUSCULAR

 ONCE A MONTH                            

 

                pravastatin 40 mg oral tablet    3/25/2014       3/20/2015       TAKE ONE TABLET BY MOUTH EVERY

 DAY                                     

 

                          Zostavax (PF) 19,400 unit/0.65 mL subcutaneous suspension for reconstitution    3/23/2015

                    3/24/2015           inject 0.65 milliliter by subcutaneous route once     

 

                famciclovir 500 mg oral tablet    12/3/2015       12/10/2015      take 1 tablet (500 mg) by

 oral route every 8 hours for 7 days     

 

                furosemide 40 mg oral tablet    2016      take 1 tablet (40 mg) by oral

 route once daily                        

 

                Cipro 500 mg oral tablet    2016       take 1 tablet (500 mg) by oral route

 2 times per day for 5 days              

 

                Bactrim -160 mg oral tablet    2016        take 1 tablet by oral route

 every 12 hours for 7 days               

 

                metoclopramide HCl 10 mg oral tablet    2017       take 1 tablet by oral

 route 2 times a day for 50 days         

 

                Macrobid 100 mg oral capsule    2017       take 1 capsule (100 mg) by oral

 route 2 times per day with food for 7 days     

 

                Augmentin 875-125 mg oral tablet    2017       take 1 tablet by oral route

 every 12 hours for 7 days               

 

                amoxicillin 500 mg oral tablet    2017       take 1 tablet (500 mg) by oral

 route every 12 hours for 7 days         

 

                ciprofloxacin HCl 500 mg oral tablet    2017       take 1 tablet (500 mg)

 by oral route every 12 hours for 5 days     

 

                cefuroxime axetil 500 mg oral tablet    2017        take 1 tablet (500 mg)

 by oral route 2 times per day for 10 days     









                                        Discontinued 

 

             Name         Start Date    Discontinued Date    SIG          Comments

 

                Tylenol 325 mg oral tablet                    2013        take 1 - 2 tablets (325 -650 mg) by oral

 route every 4-6 hours as needed         

 

                Calcium 600 + D(3) 600 mg(1,500mg) -400 unit oral tablet                    2011       take 1 tablet

 by oral route 2 times a day            no longer taking

 

                Vitamin B-12 1,000 mcg oral tablet extended release    2010       take 1

 tablet by oral route daily             no longer taking

 

                Antifungal (clotrimazole) 1 % topical cream    2010       apply to the 

affected and surrounding areas of skin by topical route 2 times per day morning 
and evening                              

 

                sertraline 100 mg oral tablet    5/10/2011       2011       take 2 tablets (200 mg) by 

oral route once daily                   discontinued by Dr. Serrano

 

                mirtazapine 15 mg oral tablet                    2011        take 1 tablet (15 mg) by oral route 

once daily before bedtime               Dr. Serrano

 

                mirtazapine 15 mg oral tablet                    2011        take 1 tablet (15 mg) by oral route 

once daily before bedtime               dc'd by Dr. Serrano

 

                Pristiq 50 mg oral tablet extended release 24 hr                    2013        take 1 tablet (50

 mg) by oral route once daily           Dr. Serrano

 

                Pristiq 50 mg oral tablet extended release 24 hr                    2013        take 1 tablet (50

 mg) by oral route once daily           dose updated

 

                Vitamin B-12 1,000 mcg/mL injection solution    2011        inject 1 milliliter

 (1,000 mcg) by intramuscular route once a month    on list already

 

                    syringe with needle 1 mL 25 gauge x 1" miscellaneous syringe    2011

                          use for injection once a month     

 

                clotrimazole 1 % topical cream    2011        apply to the affected and surrounding

 areas of skin by topical route 2 times per day in the morning and evening     

 

                Vitamin D2 50,000 unit oral capsule    2011        take 1 capsule (50,000

 unit) by oral route once weekly        generic on list

 

                Pravachol 40 mg oral tablet    2012        take 1 tablet (40 mg) by oral 

route once daily for 90 days            generic on list

 

                lithium carbonate 300 mg oral capsule    2012        take 1 capsule by oral

 route daily                            dose updated

 

                Pristiq 100 mg oral tablet extended release 24 hr                    4/10/2012       take 1 and 1/2 

tablet (150 mg) by oral route once daily    Mental Health provider

 

                Pristiq 100 mg oral tablet extended release 24 hr                    4/10/2012       take 1 and 1/2 

tablet (150 mg) by oral route once daily    Discontinued by Dr Efrain Knight at [facility]

 

                hydroxyzine HCl 50 mg oral tablet    10/16/2014      2015       take 1 tablet (50 mg) 

by oral route at bedtime                 

 

                lithium carbonate 300 mg oral capsule    2015       take 1 capsule (300

 mg) by oral route 2 for 30 days         

 

                fluconazole 100 mg oral tablet    2015       12/3/2015       take 1 tablet (100 mg) by 

oral route once a week                   

 

                ketoconazole 2 % topical cream    2015       12/3/2015       apply to the affected area(s)

 by topical route 2 times per day        

 

                prednisone 10 mg oral tablet    12/3/2015       2016        take 2 tablets (20 mg) by oral

 route once daily for 4 days 1 tablet daily for 4 days 0.5 tablet daily for 4 
days                                     

 

                triamcinolone acetonide 0.1 % topical cream    2016       apply a thin layer

 to the affected area(s) by topical route 2 times per day     

 

                Cipro 500 mg oral tablet    1/15/2017       2017       take 1 tablet (500 mg) by oral route

 every 12 hours for 10 days              







Problem List







                    Description         Status              Onset

 

                    Artificial opening status; colostomy    Active               

 

                    Bipolar disorder, unspecified    Active               

 

                    Hyperlipidemia      Active               

 

                    Peritoneal Neoplasm, Malignant    Active               

 

                    Anemia, Pernicious    Active               

 

                    Arthritis unspecified    Active               

 

                    B12 deficiency      Active               







Vital Signs







      Date    Time    BP-Sys(mm[Hg]    BP-Lynn(mm[Hg])    HR(bpm)    RR(rpm)    Temp    WT    HT    HC    BMI

                    BSA                 BMI Percentile      O2 Sat(%)

 

        2017    1:34:00 PM    118 mmHg    62 mmHg    122 bpm    18 rpm    97.8 F    161.375 lbs    

69 in                     23.83 kg/m2    1.89 m2                   96 %

 

        2017    3:05:00 PM    134 mmHg    70 mmHg    70 bpm    20 rpm    97.4 F    181 lbs    69 in

                          26.7288 kg/m    1.9992 m                 98 %

 

        2017    11:07:00 AM    124 mmHg    64 mmHg    62 bpm    17 rpm    98.2 F    181.2 lbs    69

 in                       26.76 kg/m2    2.00 m2                   98 %

 

        1/15/2017    3:34:00 PM    148 mmHg    89 mmHg    69 bpm    20 rpm    98.2 F    179 lbs    69 in

                          26.4334 kg/m    1.9882 m                 98 %

 

       2017    1:51:00 PM    160 mmHg    90 mmHg    100 bpm    20 rpm    96.5 F    179 lbs             

                                                                98 %

 

       2016    3:11:00 PM    134 mmHg    76 mmHg    80 bpm    20 rpm    98 F    163 lbs    69 in     

                24.0706 kg/m    1.8972 m                      98 %

 

        2016    2:04:00 PM    142 mmHg    86 mmHg    68 bpm    16 rpm    98.5 F    166 lbs    63 in

                          29.41 kg/m2    1.83 m2                   100 %

 

        2016    11:27:00 AM    148 mmHg    78 mmHg    90 bpm    20 rpm    98.2 F    153 lbs    69 in

                          22.5939 kg/m    1.8381 m                 96 %

 

        12/3/2015    9:50:00 AM    132 mmHg    70 mmHg    62 bpm    16 rpm    97.9 F    145 lbs    69 in

                          21.41 kg/m2    1.79 m2                   100 %

 

        2015    8:52:00 AM    132 mmHg    68 mmHg    52 bpm    20 rpm    97.8 F    141 lbs    69 in

                          20.8218 kg/m    1.7645 m                 100 %

 

        2015    3:25:00 PM    120 mmHg    62 mmHg    72 bpm    16 rpm    98.1 F    136 lbs    69 in

                          20.08 kg/m2    1.73 m2                   98 %

 

       3/23/2015    2:55:00 PM    130 mmHg    76 mmHg    68 bpm    18 rpm    97 F    140 lbs    69 in    

                20.6742 kg/m    1.7583 m                      98 %

 

        10/16/2014    11:11:00 AM    120 mmHg    66 mmHg    77 bpm    20 rpm    98 F    130 lbs    69 in

                          19.20 kg/m2    1.69 m2                   100 %

 

        2014    3:21:00 PM    130 mmHg    66 mmHg    63 bpm    18 rpm    97.2 F    160 lbs    69 in

                          23.6276 kg/m    1.8797 m                 99 %

 

        2013    10:35:00 AM    132 mmHg    70 mmHg    66 bpm    20 rpm    98.1 F    157 lbs    69 in

                          23.18 kg/m2    1.86 m2                    

 

        2013    1:29:00 PM    132 mmHg    70 mmHg    76 bpm    18 rpm    98.2 F    166 lbs    69 in 

                          24.5137 kg/m    1.9146 m                  

 

       2013    2:46:00 PM    128 mmHg    70 mmHg    76 bpm    16 rpm    98 F    160 lbs    69 in     

                23.63 kg/m2     1.88 m2                          

 

        2011    8:49:00 AM    128 mmHg    78 mmHg    70 bpm    18 rpm    97.9 F    164 lbs    69 in

                          24.2183 kg/m    1.903 m                  

 

     2011    1:31:00 PM    132 mmHg    68 mmHg    84 bpm         97 F    167 lbs                        

                                         

 

        2011    9:09:00 AM    128 mmHg    70 mmHg    72 bpm    18 rpm    98.2 F    163 lbs    64 in 

                          27.9786 kg/m    1.8272 m                  

 

       2011    10:01:00 AM    132 mmHg    70 mmHg    72 bpm    18 rpm    98.2 F    154 lbs             

                                                                 

 

       2011    2:47:00 PM    128 mmHg    70 mmHg    72 bpm    18 rpm    97.8 F    156 lbs             

                                                                 

 

       5/10/2011    3:16:00 PM    144 mmHg    80 mmHg    72 bpm    18 rpm    98.2 F    158 lbs             

                                                                 

 

        2011    10:11:00 AM    132 mmHg    70 mmHg    70 bpm    18 rpm    98.2 F    168 lbs    69 in

                          24.809 kg/m    1.9261 m                  

 

        4/15/2011    10:52:00 AM    110 mmHg    60 mmHg    75 bpm    16 rpm    97.5 F    172.375 lbs    

69 in                     25.46 kg/m2    1.95 m2                   100 %

 

        2011    11:43:00 AM    120 mmHg    82 mmHg    75 bpm    16 rpm    97.2 F    178.5 lbs    69

 in                       26.3596 kg/m    1.9854 m                 100 %

 

        10/15/2010    1:32:00 PM    120 mmHg    70 mmHg    80 bpm    18 rpm    96.6 F    177 lbs    69 in

                          26.14 kg/m2    1.98 m2                   100 %

 

        2010    3:50:00 PM    168 mmHg    100 mmHg    82 bpm    18 rpm    97.8 F    177.5 lbs    69

 in                       26.2119 kg/m    1.9798 m                 97 %

 

        2010    1:21:00 PM    140 mmHg    80 mmHg    59 bpm    16 rpm    97.6 F    173.25 lbs    69 

in                        25.58 kg/m2    1.96 m2                   100 %

 

        2010    3:02:00 PM    140 mmHg    80 mmHg    61 bpm    16 rpm    97.6 F    173.125 lbs    69

 in                       25.5658 kg/m    1.9553 m                 99 %

 

        2010    1:23:00 PM    130 mmHg    80 mmHg    66 bpm    16 rpm    96.8 F    173 lbs    69 in 

                          25.55 kg/m2    1.95 m2                   100 %

 

        2010    12:58:00 PM    130 mmHg    88 mmHg    75 bpm    16 rpm    98.4 F    172.25 lbs    69

 in                       25.4366 kg/m    1.9503 m                 100 %







Social History







                    Name                Description         Comments

 

                    denies alcohol use                         

 

                    denies smoking                           

 

                    Denies illicit substance abuse                         

 

                    retired                                 direct care

 

                    Single                                   

 

                    Exercises regularly                         

 

                    Attended some college                         

 

                    Tobacco             Never smoker         







History of Procedures







                    Date Ordered        Description         Order Status

 

                    2010 12:00 AM    COMPREHEN METABOLIC PANEL    Reviewed

 

                    2010 12:00 AM    COMPLETE CBC W/AUTO DIFF WBC    Reviewed

 

                    2010 12:00 AM    LIPID PANEL         Reviewed

 

                          2015 12:00 AM        B12 Injection, Up to 1000 Mcg NDC#6895-7458-21 [facility] Medicare 

                                        Reviewed

 

                    2011 12:00 AM    MAMMOGRAM SCREENING    Reviewed

 

                    2011 12:00 AM    CYTOPATH C/V THIN LAYER    Reviewed

 

                    2011 12:00 AM    B12 Injection 1 cc NDC#[address]-65    Reviewed

 

                    2015 12:00 AM    THER/PROPH/DIAG INJ SC/IM    Reviewed

 

                    2015 12:00 AM    B12 Injection, Up to 1000 Mcg NDC#5024-1195-53    Reviewed

 

                    2011 12:00 AM    THER/PROPH/DIAG INJ SC/IM    Reviewed

 

                    2011 12:00 AM    B12 Injection([place]) Ndc#3541-6744-65-    Reviewed

 

                    2015 12:00 AM    THER/PROPH/DIAG INJ SC/IM    Reviewed

 

                    2015 12:00 AM    B12 Injection, Up to 1000 Mcg NDC#6785-4356-72    Reviewed

 

                    10/20/2011 12:00 AM    THER/PROPH/DIAG INJ SC/IM    Reviewed

 

                    10/20/2011 12:00 AM    B12 Injection([place]) Ndc#7507-7034-63-    Reviewed

 

                    2016 12:00 AM    THER/PROPH/DIAG INJ SC/IM    Reviewed

 

                    2016 12:00 AM    B12 Injection, Up to 1000 Mcg NDC#9536-8801-11    Reviewed

 

                    3/14/2016 12:00 AM    VITAMIN B-12        Reviewed

 

                    3/15/2016 12:00 AM    THER/PROPH/DIAG INJ SC/IM    Reviewed

 

                    3/15/2016 12:00 AM    B12 Injection, Up to 1000 Mcg NDC#7854-9884-37    Reviewed

 

                    2011 12:00 AM    ***Immunization administration, Medicare flu    Reviewed

 

                    2011 12:00 AM    Fluzone ** MEDICARE Only **    Reviewed

 

                    2011 12:00 AM    THER/PROPH/DIAG INJ SC/IM    Reviewed

 

                    2011 12:00 AM    B12 Injection ([employer] [facility]) Ndc#8021-9772-38    Reviewed

 

                    2016 12:00 AM    B12 Injection, Up to 1000 Mcg NDC#9983-0701-67 [facility] Medicare    

Reviewed

 

                    2016 12:00 AM    TTE W/DOPPLER COMPLETE    Reviewed

 

                    2016 12:00 AM    EXTREMITY STUDY     Reviewed

 

                          2016 12:00 AM        B12 Injection, Up to 1000 Mcg NDC#6982-7305-95 [facility] Medicare 

                                        Reviewed

 

                    2016 12:00 AM    THER/PROPH/DIAG INJ SC/IM    Reviewed

 

                    2016 12:00 AM    B12 Injection, Up to 1000 Mcg NDC#3146-2409-69    Reviewed

 

                    2016 12:00 AM    THER/PROPH/DIAG INJ SC/IM    Reviewed

 

                    2012 12:00 AM    B12 Injection([facility]) Ndc#3606-4431-30-    Reviewed

 

                    2016 12:00 AM    B12 Injection, Up to 1000 Mcg NDC#1725-8400-24    Reviewed

 

                    2016 12:00 AM    THER/PROPH/DIAG INJ SC/IM    Reviewed

 

                    2012 12:00 AM    THER/PROPH/DIAG INJ SC/IM    Reviewed

 

                    2012 12:00 AM    B12 Injection ([employer]) Ndc#0416-3801-83    Reviewed

 

                    2016 12:00 AM    THER/PROPH/DIAG INJ SC/IM    Reviewed

 

                    2016 12:00 AM    B12 Injection, Up to 1000 Mcg NDC#6136-4987-72    Reviewed

 

                    2016 12:00 AM    B12 Injection, Up to 1000 Mcg NDC#4111-1198-94    Reviewed

 

                    2016 12:00 AM    THER/PROPH/DIAG INJ SC/IM    Reviewed

 

                    2012 12:00 AM    THER/PROPH/DIAG INJ SC/IM    Reviewed

 

                    2012 12:00 AM    B12 Injection([facility]) Ndc#2468-7853-07-    Reviewed

 

                    12/15/2016 12:00 AM    B12 Injection, Up to 1000 Mcg NDC#9011-6088-86    Reviewed

 

                    12/15/2016 12:00 AM    THER/PROPH/DIAG INJ SC/IM    Reviewed

 

                    2016 12:00 AM    URNLS DIP STICK/TABLET RGNT AUTO W/O MICROSCOPY    Reviewed

 

                    1/3/2017 12:00 AM    URNLS DIP STICK/TABLET RGNT AUTO W/O MICROSCOPY    Reviewed

 

                    2017 12:00 AM    URINE CULTURE/COLONY COUNT    Reviewed

 

                    2017 12:00 AM    Rocephin 1 gram Injection, [facility]    Reviewed

 

                    2017 12:00 AM    THERAPEUTIC PROPHYLACTIC/DX INJECTION SUBQ/IM    Reviewed

 

                    2017 12:00 AM    B12 1000mcg Injection, [facility] Medicare    Reviewed

 

                    5/3/2012 12:00 AM    THER/PROPH/DIAG INJ SC/IM    Reviewed

 

                    5/3/2012 12:00 AM    B12 Injection([facility]) Ndc#6899-6613-82-    Reviewed

 

                    2017 12:00 AM    THERAPEUTIC PROPHYLACTIC/DX INJECTION SUBQ/IM    Reviewed

 

                    2017 12:00 AM    B12 1000mcg Injection, [facility]    Reviewed

 

                    2017 12:00 AM    MRI BRAIN STEM W/O & W/DYE    Reviewed

 

                    2017 12:00 AM    VITAMIN B-12        Reviewed

 

                    2017 12:00 AM    Speech Therapy Consult    Reviewed

 

                    2017 12:00 AM    ASSAY OF CREATININE    Reviewed

 

                    2012 12:00 AM    IMMUNOTHERAPY INJECTIONS    Reviewed

 

                    2012 12:00 AM    B12 Injection([facility]) Ndc#4522-3644-44-    Reviewed

 

                    2017 12:00 AM    URINALYSIS AUTO W/SCOPE    Reviewed

 

                    2012 12:00 AM    THER/PROPH/DIAG INJ SC/IM    Reviewed

 

                    2012 12:00 AM    B12 Injection, Up to 1000 Mcg NDC#2176-0003-68    Reviewed

 

                    2017 12:00 AM    URINALYSIS AUTO W/SCOPE    Reviewed

 

                    2017 2:18 PM    URINALYSIS AUTO W/O SCOPE    Reviewed

 

                    2017 12:00 AM    URINE CULTURE/COLONY COUNT    Reviewed

 

                    2017 12:00 AM    B12 1000mcg Injection    Reviewed

 

                    2017 12:00 AM    URNLS DIP STICK/TABLET RGNT AUTO W/O MICROSCOPY    Reviewed

 

                    2017 12:00 AM    METABOLIC PANEL TOTAL CA    Reviewed

 

                    2017 12:00 AM    URNLS DIP STICK/TABLET RGNT AUTO W/O MICROSCOPY    Reviewed

 

                    2012 12:00 AM    THER/PROPH/DIAG INJ SC/IM    Reviewed

 

                    2012 12:00 AM    B12 Injection, Up to 1000 Mcg NDC#9456-3768-49    Reviewed

 

                    2012 12:00 AM    THER/PROPH/DIAG INJ SC/IM    Reviewed

 

                    2012 12:00 AM    B12 Injection, Up to 1000 Mcg NDC#5325-6841-26    Reviewed

 

                    10/16/2012 12:00 AM    THER/PROPH/DIAG INJ SC/IM    Reviewed

 

                    10/16/2012 12:00 AM    B12 Injection, Up to 1000 Mcg NDC#8200-7248-49    Reviewed

 

                    2010 12:00 AM    COMPREHEN METABOLIC PANEL    Reviewed

 

                    2010 12:00 AM    COMPLETE CBC W/AUTO DIFF WBC    Reviewed

 

                    2010 12:00 AM    LIPID PANEL         Reviewed

 

                    2013 12:00 AM    Flu Injection 3 Years And Above NDC# [address]-16  [facility]    Reviewed



 

                    2013 12:00 AM    COMPLETE CBC W/AUTO DIFF WBC    Reviewed

 

                    2013 12:00 AM    ASSAY OF LITHIUM    Reviewed

 

                    2013 12:00 AM    METABOLIC PANEL TOTAL CA    Reviewed

 

                    4/3/2013 12:00 AM    THER/PROPH/DIAG INJ SC/IM    Reviewed

 

                    4/3/2013 12:00 AM    B12 Injection, Up to 1000 Mcg NDC#6919-2720-37    Reviewed

 

                    2013 12:00 AM    THER/PROPH/DIAG INJ SC/IM    Reviewed

 

                    2013 12:00 AM    B12 Injection, Up to 1000 Mcg NDC#2172-5080-48    Reviewed

 

                    2013 12:00 AM    THER/PROPH/DIAG INJ SC/IM    Reviewed

 

                    2013 12:00 AM    B12 Injection, Up to 1000 Mcg NDC#6679-1963-02    Reviewed

 

                    2013 12:00 AM    LIPID PANEL         Reviewed

 

                    2013 12:00 AM    VITAMIN D 25 HYDROXY    Reviewed

 

                    2013 12:00 AM    THER/PROPH/DIAG INJ SC/IM    Reviewed

 

                    2013 12:00 AM    B12 Injection, Up to 1000 Mcg NDC#2038-4890-81    Reviewed

 

                    2013 12:00 AM    THER/PROPH/DIAG INJ SC/IM    Reviewed

 

                    3/6/2014 12:00 AM    THER/PROPH/DIAG INJ SC/IM    Reviewed

 

                    2014 12:00 AM    THER/PROPH/DIAG INJ SC/IM    Reviewed

 

                    2014 12:00 AM    B12 Injection, Up to 1000 Mcg NDC#0246-6213-26    Reviewed

 

                    2010 12:00 AM    SKIN FUNGI CULTURE    Reviewed

 

                    10/9/2010 12:00 AM    COMPREHEN METABOLIC PANEL    Reviewed

 

                    10/9/2010 12:00 AM    LIPID PANEL         Reviewed

 

                    2010 12:00 AM    THER/PROPH/DIAG INJ SC/IM    Reviewed

 

                    2010 12:00 AM    B12 Injection Ndc#[address]-64 ([place])    Reviewed

 

                    2010 12:00 AM    THER/PROPH/DIAG INJ SC/IM    Reviewed

 

                    2010 12:00 AM    Kenalog 40 Mg Im-Ndc#[address]-45 ([facility])    Reviewed

 

                    10/15/2010 12:00 AM    FLU VACCINE 3 YRS & > IM    Reviewed

 

                    10/15/2010 12:00 AM    Admin.Of M/C Cov.Vaccine-Flu Vacc.    Reviewed

 

                    1/15/2011 12:00 AM    COMPLETE CBC W/AUTO DIFF WBC    Reviewed

 

                    1/15/2011 12:00 AM    COMPREHEN METABOLIC PANEL    Reviewed

 

                    1/15/2011 12:00 AM    LIPID PANEL         Reviewed

 

                    2014 12:00 AM    MAMMOGRAM SCREENING    Reviewed

 

                    2014 12:00 AM    Screening mammography, bilateral    Reviewed

 

                    7/10/2014 12:00 AM    THER/PROPH/DIAG INJ SC/IM    Reviewed

 

                    7/10/2014 12:00 AM    B12 Injection, Up to 1000 Mcg NDC#9686-1264-44    Reviewed

 

                    2011 12:00 AM    COMPLETE CBC W/AUTO DIFF WBC    Reviewed

 

                    2011 12:00 AM    COMPREHEN METABOLIC PANEL    Reviewed

 

                    2011 12:00 AM    LIPID PANEL         Reviewed

 

                    2014 12:00 AM    B12 Injection, Up to 1000 Mcg NDC#3156-8936-94    Reviewed

 

                    10/19/2014 12:00 AM    MAMMOGRAM SCREENING    Reviewed

 

                    10/19/2014 12:00 AM    Screening mammography, bilateral    Reviewed

 

                    10/16/2014 12:00 AM    B12 Injection, Up to 1000 Mcg NDC#2205-4585-07    Reviewed

 

                    10/16/2014 12:00 AM    COMPLETE CBC W/AUTO DIFF WBC    Reviewed

 

                    10/16/2014 12:00 AM    COMPREHEN METABOLIC PANEL    Reviewed

 

                    10/16/2014 12:00 AM    IMMUNOASSAY TUMOR     Reviewed

 

                    10/16/2014 12:00 AM    LIPID PANEL         Reviewed

 

                    10/16/2014 12:00 AM    ASSAY OF LITHIUM    Reviewed

 

                    10/16/2014 12:00 AM    MAMMOGRAM SCREENING    Reviewed

 

                    2011 12:00 AM    ASSAY OF PARATHORMONE    Reviewed

 

                    2011 12:00 AM    VITAMIN D 25 HYDROXY    Reviewed

 

                    2011 12:00 AM    ASSAY OF LITHIUM    Reviewed

 

                    2011 12:00 AM    METABOLIC PANEL TOTAL CA    Reviewed

 

                    2011 12:00 AM    CT HEAD/BRAIN W/O & W/DYE    Reviewed

 

                    3/23/2015 12:00 AM    PNEUMOCOCCAL VACC 13 GLENDY IM    Reviewed

 

                    3/23/2015 12:00 AM    Vitamin B12 injection    Reviewed

 

                    2011 12:00 AM    ASSAY OF LITHIUM    Reviewed

 

                    2011 12:00 AM    B12 Injection Ndc#[address]-50  [place]    Reviewed

 

                    2015 12:00 AM    THER/PROPH/DIAG INJ SC/IM    Reviewed

 

                    2015 12:00 AM    B12 Injection, Up to 1000 Mcg NDC#1357-0439-17    Reviewed

 

                    2015 12:00 AM    COMPLETE CBC W/AUTO DIFF WBC    Reviewed

 

                    2015 12:00 AM    COMPREHEN METABOLIC PANEL    Reviewed

 

                    2015 12:00 AM    LIPID PANEL         Reviewed

 

                    2015 12:00 AM    ASSAY OF LITHIUM    Reviewed

 

                    2011 12:00 AM    VIT D 1 25-DIHYDROXY    Reviewed

 

                    2011 12:00 AM    VITAMIN B-12        Reviewed

 

                    2015 12:00 AM    B12 Injection, Up to 1000 Mcg NDC#7122-2697-54    Reviewed

 

                    2015 12:00 AM    THER/PROPH/DIAG INJ SC/IM    Reviewed

 

                    2015 12:00 AM    B12 Injection, Up to 1000 Mcg NDC#0507-5625-10    Reviewed

 

                    2011 12:00 AM    THER/PROPH/DIAG INJ SC/IM    Reviewed

 

                    2011 12:00 AM    B12 Injection ([employer]) Ndc#3319-5427-73    Reviewed

 

                    2015 12:00 AM    THER/PROPH/DIAG INJ SC/IM    Reviewed

 

                    2015 12:00 AM    B12 Injection, Up to 1000 Mcg NDC#1599-3746-11    Reviewed







Results Summary







                          Date and Description      Results

 

                          2004 12:00 AM        Colonoscopy-Women and Men over 50 Normal 

 

                          2008 12:00 AM         Pap Smear Declined 

 

                          10/7/2009 12:00 AM        Cholest Cry Stone Ql .0 %LDLc SerPl-mCnc 123.0 mg/dLHDLc

 SerPl-mCnc 34.0 mg/dLTrigl SerPl-mCnc 190.0 mg/dLGlucose SerPl-mCnc 78.0 mg/dL

 

                          2009 12:00 AM        Mammogram -Women over 40 Normal HIV1+2 Ab Ser Ql no risk 

 

                          2010 8:47 AM         Dexa Bone Scan Refused Aspirin reccommended Contraindication 



 

                          2010 8:48 AM         Depression Done 

 

                          2010 12:00 AM         Foot Exam-Diabetic Done 

 

                          2010 12:00 AM         Cholest Cry Stone Ql .0 %LDLc SerPl-mCnc 126.0 mg/dLGlucose

 SerPl-mCnc 102.0 mg/dL

 

                          2010 8:45 AM          TRIGLYCERIDES 122.0 mg/dLCHOLESTEROL 186.0 mg/dLHDL 36.0 mg/dLTOT

 CHOL/HDL 5.2 LDL (CALC) 126.0 mg/dLGLUCOSE 102.0 mg/dLSODIUM 143.0 
mmol/LPOTASSIUM 3.70 mmol/LCHLORIDE 111.0 mmol/LCO2 23.0 mmol/LBUN 10.0 
mg/dLCREATININE 0.80 mg/dLSGOT/AST 12.0 IU/LSGPT/ALT 11.0 IU/LALK PHOS 65.0 
IU/LTOTAL PROTEIN 7.20 g/dLALBUMIN 3.90 g/dLTOTAL BILI 0.50 mg/dLCALCIUM 10.20 
mg/dLAGE 59 GFR NonAA 73 GFR AA 88 eGFR >60 mL/min/1.73 m2eGFR AA* >60 WBC 5.7 
RBC 3.26 HGB 10.60 g/dLHCT 31.70 %MCV 97.0 fLMCH 32.50 pgMCHC 33.40 g/dLRDW SD 
47 RDW CV 13.30 %MPV 9.70 fLPLT 287 NRBC# 0.00 NRBC% 0.0 %NEUT 62.90 %%LYMP 
21.80 %%MONO 9.90 %%EOS 5.0 %%BASO 0.40 %#NEUT 3.56 #LYMP 1.23 #MONO 0.56 #EOS 
0.28 #BASO 0.02 MANUAL DIFF NOT IND 

 

                          2010 12:00 AM        Glucose SerPl-mCnc 96.0 mg/dLCholest Cry Stone Ql .0 %LDLc

 SerPl-mCnc 146.0 mg/dL

 

                          2010 8:26 AM         TRIGLYCERIDES 106.0 mg/dLCHOLESTEROL 199.0 mg/dLHDL 32.0 mg/dLTOT

 CHOL/HDL 6.2 LDL (CALC) 146.0 mg/dLGLUCOSE 96.0 mg/dLSODIUM 143.0 
mmol/LPOTASSIUM 4.0 mmol/LCHLORIDE 113.0 mmol/LCO2 24.0 mmol/LBUN 13.0 
mg/dLCREATININE 1.0 mg/dLSGOT/AST 11.0 IU/LSGPT/ALT 6.0 IU/LALK PHOS 56.0 
IU/LTOTAL PROTEIN 6.60 g/dLALBUMIN 3.80 g/dLTOTAL BILI 0.50 mg/dLCALCIUM 9.30 
mg/dLAGE 59 GFR NonAA 57 GFR AA 69 eGFR 57 eGFR AA* >60 

 

                          10/6/2010 12:00 AM        Cholest Cry Stone Ql .0 %LDLc SerPl-mCnc 111.0 mg/dLGlucose

 SerPl-mCnc 81.0 mg/dL

 

                          10/6/2010 2:45 PM         TRIGLYCERIDES 123.0 mg/dLCHOLESTEROL 178.0 mg/dLHDL 42.0 mg/dLTOT

 CHOL/HDL 4.2 LDL (CALC) 111.0 mg/dLGLUCOSE 81.0 mg/dLSODIUM 139.0 
mmol/LPOTASSIUM 4.10 mmol/LCHLORIDE 106.0 mmol/LCO2 24.0 mmol/LBUN 13.0 
mg/dLCREATININE 0.90 mg/dLSGOT/AST 13.0 IU/LSGPT/ALT 11.0 IU/LALK PHOS 61.0 
IU/LTOTAL PROTEIN 7.10 g/dLALBUMIN 3.90 g/dLTOTAL BILI 0.30 mg/dLCALCIUM 9.30 
mg/dLAGE 60 GFR NonAA 64 GFR AA 78 eGFR >60 mL/min/1.73 m2eGFR AA* >60 WBC 6.9 
RBC 3.59 HGB 11.50 g/dLHCT 35.30 %MCV 98.0 fLMCH 32.0 pgMCHC 32.60 g/dLRDW SD 46
 RDW CV 12.90 %MPV 9.90 fLPLT 311 NRBC# 0.00 NRBC% 0.0 %NEUT 64.90 %%LYMP 22.50 
%%MONO 7.20 %%EOS 5.10 %%BASO 0.30 %#NEUT 4.45 #LYMP 1.54 #MONO 0.49 #EOS 0.35 
#BASO 0.02 MANUAL DIFF NOT IND 

 

                          2011 12:00 AM         Mammogram -Women over 40 Ordered 

 

                          2011 10:25 AM        TRIGLYCERIDES 111.0 mg/dLCHOLESTEROL 195.0 mg/dLHDL 43.0 mg/dLTOT

 CHOL/HDL 4.5 LDL (CALC) 130.0 mg/dLWBC 5.3 RBC 3.76 HGB 12.0 g/dLHCT 37.80 %MCV
 101.0 fLMCH 31.90 pgMCHC 31.70 g/dLRDW SD 47 RDW CV 13.0 %MPV 9.70 fLPLT 259 
NRBC# 0.00 NRBC% 0.0 %NEUT 69.0 %%LYMP 17.60 %%MONO 8.30 %%EOS 4.70 %%BASO 0.40 
%#NEUT 3.63 #LYMP 0.93 #MONO 0.44 #EOS 0.25 #BASO 0.02 MANUAL DIFF NOT IND 
GLUCOSE 102.0 mg/dLSODIUM 146.0 mmol/LPOTASSIUM 4.20 mmol/LCHLORIDE 113.0 
mmol/LCO2 23.0 mmol/LBUN 15.0 mg/dLCREATININE 1.0 mg/dLSGOT/AST 12.0 
IU/LSGPT/ALT 17.0 IU/LALK PHOS 60.0 IU/LTOTAL PROTEIN 6.90 g/dLALBUMIN 4.20 
g/dLTOTAL BILI 0.40 mg/dLCALCIUM 9.70 mg/dLAGE 60 GFR NonAA 57 GFR AA 69 eGFR 57
 eGFR AA* >60 

 

                          2011 11:49 AM        Cholest Cry Stone Ql .0 %LDLc SerPl-mCnc 130.0 mg/dLHDLc

 SerPl-mCnc 43.0 mg/dLTrigl SerPl-mCnc 111.0 mg/dLGlucose SerPl-mCnc 102.0 mg/dL

 

                          2011 11:52 AM        Pap Smear Declined 

 

                          2011 11:28 AM        Lithium 2.080 mmol/LGLUCOSE 102.0 mg/dLSODIUM 135.0 mmol/LPOTASSIUM

 3.90 mmol/LCHLORIDE 106.0 mmol/LCO2 21.0 mmol/LBUN 12.0 mg/dLCREATININE 1.30 
mg/dLCALCIUM 10.70 mg/dLAGE 60 GFR NonAA 42 GFR AA 51 eGFR 42 eGFR AA* 51 

 

                          2011 8:58 AM          Lithium 0.690 mmol/L

 

                          2011 2:38 PM         VITAMIN B12 3483.0 pg/mL

 

                          2013 3:35 PM          WBC 5.1 RBC 3.73 HGB 11.70 g/dLHCT 36.40 %MCV 98.0 fLMCH 31.40

 pgMCHC 32.10 g/dLRDW SD 47 RDW CV 13.10 %MPV 9.80 fLPLT 224 NRBC# 0.00 NRBC% 
0.0 %NEUT 66.80 %%LYMP 19.10 %%MONO 9.0 %%EOS 4.90 %%BASO 0.20 %#NEUT 3.42 #LYMP
 0.98 #MONO 0.46 #EOS 0.25 #BASO 0.01 MANUAL DIFF NOT IND GLUCOSE 88.0 
mg/dLSODIUM 141.0 mmol/LPOTASSIUM 4.10 mmol/LCHLORIDE 110.0 mmol/LCO2 22.0 
mmol/LBUN 22.0 mg/dLCREATININE 1.10 mg/dLCALCIUM 9.80 mg/dLAGE 62 GFR NonAA 50 
GFR AA 61 eGFR 50 eGFR AA* 60 Lithium 0.760 mmol/L

 

                          2013 11:02 AM        TRIGLYCERIDES 106.0 mg/dLCHOLESTEROL 181.0 mg/dLHDL 46.0 mg/dLTOT

 CHOL/HDL 3.9 LDL (CALC) 114.0 mg/dLVITAMIN D 41.10 ng/mL

 

                          10/17/2014 10:10 AM       WBC 5.0 RBC 3.66 HGB 11.60 g/dLHCT 36.80 %.0 fLMCH 31.70

 pgMCHC 31.50 g/dLRDW SD 50 RDW CV 13.50 %MPV 10.10 fLPLT 209 NRBC# 0.00 NRBC% 
0.0 %NEUT 69.20 %%LYMP 21.0 %%MONO 6.40 %%EOS 3.20 %%BASO 0.20 %#NEUT 3.46 #LYMP
 1.05 #MONO 0.32 #EOS 0.16 #BASO 0.01 MANUAL DIFF NOT IND GLUCOSE 100.0 
mg/dLSODIUM 148.0 mmol/LPOTASSIUM 3.90 mmol/LCHLORIDE 114.0 mmol/LCO2 26.0 
mmol/LBUN 12.0 mg/dLCREATININE 1.20 mg/dLSGOT/AST 9.0 IU/LSGPT/ALT <6 IU/LALK 
PHOS 82.0 IU/LTOTAL PROTEIN 6.90 g/dLALBUMIN 4.0 g/dLTOTAL BILI 0.40 
mg/dLCALCIUM 10.50 mg/dLAGE 64 GFR NonAA 45 GFR AA 55 eGFR 45 eGFR AA* 55 
TRIGLYCERIDES 96.0 mg/dLCHOLESTEROL 155.0 mg/dLHDL 38.0 mg/dLTOT CHOL/HDL 4.1 
LDL (CALC) 98.0 mg/dLLithium 0.850 mmol/LCancer Antigen (CA) 125 8.30 U/mL

 

                          2015 10:25 AM        Lithium 0.790 mmol/LWBC 4.8 RBC 3.44 HGB 11.0 g/dLHCT 35.20 

%.0 fLMCH 32.0 pgMCHC 31.30 g/dLRDW SD 53 RDW CV 14.0 %MPV 9.30 fLPLT 210
 NRBC# 0.00 NRBC% 0.0 %NEUT 70.80 %%LYMP 17.20 %%MONO 8.10 %%EOS 3.50 %%BASO 
0.40 %#NEUT 3.41 #LYMP 0.83 #MONO 0.39 #EOS 0.17 #BASO 0.02 MANUAL DIFF NOT IND 
TRIGLYCERIDES 107.0 mg/dLCHOLESTEROL 174.0 mg/dLHDL 43.0 mg/dLTOT CHOL/HDL 4.0 
LDL (CALC) 110.0 mg/dLGLUCOSE 90.0 mg/dLSODIUM 145.0 mmol/LPOTASSIUM 3.80 
mmol/LCHLORIDE 115.0 mmol/LCO2 24.0 mmol/LBUN 17.0 mg/dLCREATININE 1.30 
mg/dLSGOT/AST 18.0 IU/LSGPT/ALT 17.0 IU/LALK PHOS 56.0 IU/LTOTAL PROTEIN 6.70 
g/dLALBUMIN 3.90 g/dLTOTAL BILI 0.40 mg/dLCALCIUM 9.80 mg/dLAGE 64 GFR NonAA 41 
GFR AA 50 eGFR 41 eGFR AA* 50 

 

                          2015 8:50 AM        WBC 5.8 RBC 3.29 HGB 10.70 g/dLHCT 34.0 %.0 fLMCH 32.50

 pgMCHC 31.50 g/dLRDW SD 52 RDW CV 13.60 %MPV 9.60 fLPLT 223 NRBC# 0.00 NRBC% 
0.0 %NEUT 69.60 %%LYMP 18.90 %%MONO 8.50 %%EOS 2.80 %%BASO 0.20 %#NEUT 4.03 
#LYMP 1.09 #MONO 0.49 #EOS 0.16 #BASO 0.01 MANUAL DIFF NOT IND Lithium 0.620 
mmol/LGLUCOSE 83.0 mg/dLSODIUM 139.0 mmol/LPOTASSIUM 3.90 mmol/LCHLORIDE 109.0 
mmol/LCO2 22.0 mmol/LBUN 19.0 mg/dLCREATININE 1.40 mg/dLSGOT/AST 19.0 
IU/LSGPT/ALT 21.0 IU/LALK PHOS 55.0 IU/LTOTAL PROTEIN 6.50 g/dLALBUMIN 3.90 
g/dLTOTAL BILI 0.50 mg/dLCALCIUM 9.60 mg/dLAGE 65 GFR NonAA 38 GFR AA 46 eGFR 38
 eGFR AA* 46 TRIGLYCERIDES 121.0 mg/dLCHOLESTEROL 192.0 mg/dLHDL 51.0 mg/dLTOT 
CHOL/HDL 3.8 .0 mg/dLFREE T4 0.79 TSH 1.210 uIU/mLHemoglobin A1c 5.40 
%Estim. Avg Glu (eAG) 108 

 

                          3/15/2016 8:08 AM         VITAMIN B12 696.0 pg/mL

 

                          3/23/2016 8:26 AM         WBC 7.0 RBC 3.61 HGB 11.80 g/dLHCT 37.70 %.0 fLH 32.70

 pgMCHC 31.30 g/dLRDW SD 49 RDW CV 12.50 %MPV 10.0 fLPLT 207 NRBC# 0.00 NRBC% 
0.0 %NEUT 73.60 %%LYMP 16.40 %%MONO 6.60 %%EOS 3.0 %%BASO 0.30 %#NEUT 5.15 #LYMP
 1.15 #MONO 0.46 #EOS 0.21 #BASO 0.02 MANUAL DIFF NOT IND Lithium 0.940 
mmol/LGLUCOSE 108.0 mg/dLSODIUM 143.0 mmol/LPOTASSIUM 4.30 mmol/LCHLORIDE 110.0 
mmol/LCO2 27.0 mmol/LBUN 16.0 mg/dLCREATININE 1.60 mg/dLSGOT/AST 13.0 
IU/LSGPT/ALT 7.0 IU/LALK PHOS 71.0 IU/LTOTAL PROTEIN 6.80 g/dLALBUMIN 4.0 
g/dLTOTAL BILI 0.20 mg/dLCALCIUM 10.40 mg/dLAGE 65 GFR NonAA 32 GFR AA 39 eGFR 
32 eGFR AA* 39 TRIGLYCERIDES 113.0 mg/dLCHOLESTEROL 169.0 mg/dLHDL 42.0 mg/dLTOT
 CHOL/HDL 4.0 LDL (CALC) 104.0 mg/dLFREE T4 0.86 TSH 2.20 uIU/mLHemoglobin A1c 
5.20 %Estim. Avg Glu (eAG) 103 

 

                          3/25/2016 9:17 AM         COLOR YELLOW APPEARANCE CLEAR SPEC GRAV 1.010 pH 7.0 PROTEIN 

NEGATIVE GLUCOSE NEGATIVE mg/dLKETONE NEGATIVE BILIRUBIN NEGATIVE BLOOD NEGATIVE
 NITRITE NEGATIVE [place] SCREEN SMALL MICRO IND? SEE BELOW WBC/HPF 0-5 RBC/HPF 
NEGATIVE CASTS/LPF NEGATIVE /LPFCRYSTALS NEGATIVE MUCOUS THRDS NEGATIVE BACTERIA
 NEGATIVE EPITH CELLS FEW SQUAMOUS /HPFTRICHOMONAS NEGATIVE YEAST NEGATIVE 

 

                          2016 6:00 AM        GLUCOSE 91.0 mg/dLSODIUM 143.0 mmol/LPOTASSIUM 3.60 mmol/LCHLORIDE

 112.0 mmol/LCO2 23.0 mmol/LBUN 22.0 mg/dLCREATININE 1.20 mg/dLSGOT/AST 15.0 
IU/LSGPT/ALT 12.0 IU/LALK PHOS 61.0 IU/LTOTAL PROTEIN 5.40 g/dLALBUMIN 3.10 
g/dLTOTAL BILI 0.40 mg/dLCALCIUM 8.40 mg/dLAGE 66 GFR NonAA 45 GFR AA 55 eGFR 45
 eGFR AA* 55 WBC 3.0 RBC 3.05 HGB 9.80 g/dLHCT 32.10 %.0 fLMCH 32.10 
pgMCHC 30.50 g/dLRDW SD 54 RDW CV 14.20 %MPV 10.10 fLPLT 170 NRBC# 0.00 NRBC% 
0.0 %NEUT 50.70 %%LYMP 32.60 %%MONO 10.50 %%EOS 5.90 %%BASO 0.30 %#NEUT 1.54 
#LYMP 0.99 #MONO 0.32 #EOS 0.18 #BASO 0.01 MANUAL DIFF NOT IND 

 

                          2016 2:09 PM        COLOR YELLOW APPEARANCE CLEAR SPEC GRAV 1.010 pH 5.0 PROTEIN

 30 GLUCOSE NEGATIVE mg/dLKETONE NEGATIVE BILIRUBIN NEGATIVE BLOOD LARGE NITRITE
 NEGATIVE [place] SCREEN MODERATE MICRO INDICATED? SEE BELOW WBC/HPF  RBC/HPF
 20-50 CASTS/LPF NEGATIVE /LPFCRYSTALS NEGATIVE MUCOUS THRDS NEGATIVE BACTERIA 
NEGATIVE EPITH CELLS FEW SQUAMOUS /HPFTRICHOMONAS NEGATIVE YEAST NEGATIVE CULT 
SET UP? YES 

 

                          1/3/2017 4:08 PM          COLOR YELLOW APPEARANCE HAZY SPEC GRAV 1.015 pH 6.0 PROTEIN 30

 GLUCOSE NEGATIVE mg/dLKETONE NEGATIVE BILIRUBIN NEGATIVE BLOOD MODERATE NITRITE
 NEGATIVE [place] SCREEN LARGE MICRO INDICATED? SEE BELOW WBC/-200 RBC/HPF 
5-10 CASTS/LPF NEGATIVE /LPFCRYSTALS NEGATIVE MUCOUS THRDS NEGATIVE BACTERIA 
NEGATIVE EPITH CELLS 1+ SQUAMOUS /HPFTRICHOMONAS NEGATIVE YEAST NEGATIVE CULT 
SET UP? YES 

 

                          2017 4:24 PM         CREATININE 1.50 mg/dLAGE 66 GFR NonAA 35 GFR AA 42 eGFR 35 eGFR

 AA* 42 VITAMIN B12 1324.0 pg/mL

 

                          2017 11:30 AM         GLUCOSE 93.0 mg/dLSODIUM 143.0 mmol/LPOTASSIUM 3.10 mmol/LCHLORIDE

 101.0 mmol/LCO2 29.0 mmol/LBUN 26.0 mg/dLCREATININE 1.50 mg/dLSGOT/AST 23.0 
IU/LSGPT/ALT 13.0 IU/LALK PHOS 66.0 IU/LTOTAL PROTEIN 7.70 g/dLALBUMIN 4.30 
g/dLTOTAL BILI 0.40 mg/dLCALCIUM 10.30 mg/dLAGE 66 GFR NonAA 35 GFR AA 42 eGFR 
35 eGFR AA* 42 TRIGLYCERIDES 147.0 mg/dLCHOLESTEROL 184.0 mg/dLHDL 44.0 mg/dLTOT
 CHOL/HDL 4.2 .0 mg/dLWBC 5.4 RBC 3.98 HGB 12.90 g/dLHCT 40.20 %.0
 fLMCH 32.40 pgMCHC 32.10 g/dLRDW SD 50 RDW CV 13.50 %MPV 9.30 fLPLT 210 NRBC# 
0.00 NRBC% 0.0 %NEUT 54.20 %%LYMP 30.70 %%MONO 9.10 %%EOS 5.20 %%BASO 0.40 
%#NEUT 2.94 #LYMP 1.66 #MONO 0.49 #EOS 0.28 #BASO 0.02 MANUAL DIFF NOT IND FREE 
T4 1.09 COLOR YELLOW APPEARANCE CLEAR SPEC GRAV <=1.005 pH 5.5 PROTEIN NEGATIVE 
GLUCOSE NEGATIVE mg/dLKETONE NEGATIVE BILIRUBIN NEGATIVE BLOOD NEGATIVE NITRITE 
NEGATIVE [place]UK SCREEN LARGE MICRO INDICATED? SEE BELOW WBC/HPF 10-20 RBC/HPF 0-5 
CASTS/LPF NEGATIVE /LPFCRYSTALS NEGATIVE MUCOUS THRDS NEGATIVE BACTERIA FEW 
EPITH CELLS 1+ SQUAMOUS /HPFTRICHOMONAS NEGATIVE YEAST NEGATIVE CULT SET UP? YES
 TSH 1.820 uIU/mL

 

                          2017 2:45 PM         COLOR YELLOW APPEARANCE CLEAR SPEC GRAV <=1.005 pH 6.0 PROTEIN

 NEGATIVE GLUCOSE NEGATIVE mg/dLKETONE NEGATIVE BILIRUBIN NEGATIVE BLOOD TRACE-
INTACT NITRITE NEGATIVE [place] SCREEN SMALL MICRO INDICATED? SEE BELOW WBC/HPF 0-5
 RBC/HPF NEGATIVE CASTS/LPF NEGATIVE /LPFCRYSTALS NEGATIVE MUCOUS THRDS NEGATIVE
 BACTERIA FEW EPITH CELLS FEW SQUAMOUS /HPFTRICHOMONAS NEGATIVE YEAST NEGATIVE 
CULT SET UP? YES 

 

                          2017 11:22 AM        COLOR YELLOW APPEARANCE CLEAR SPEC GRAV <=1.005 pH 6.5 PROTEIN

 NEGATIVE GLUCOSE NEGATIVE mg/dLKETONE NEGATIVE BILIRUBIN NEGATIVE BLOOD 
NEGATIVE NITRITE NEGATIVE LEUK SCREEN NEGATIVE MICRO INDICATED? NOT INDICATED 

 

                          2017 2:18 PM         Clarity Ur cloudy Color Ur dark yellow Glucose Ur-sCnc negative

 Bilirub Ur Ql Strip negative Ketones Ur Ql Strip negative Sp Gr Ur Qn 1.010 Hgb
 Ur Ql Strip trace-intact pH Ur-LsCnc 6.5 Prot Ur Ql Strip trace Urobilinogen 
Ur-mCnc 0.2 Nitrite Ur Ql Strip negative WBC Est Ur Ql Strip large 

 

                          2017 9:28 AM         GLUCOSE 109.0 mg/dLSODIUM 142.0 mmol/LPOTASSIUM 3.60 mmol/LCHLORIDE

 106.0 mmol/LCO2 23.0 mmol/LBUN 11.0 mg/dLCREATININE 1.30 mg/dLCALCIUM 9.80 
mg/dLAGE 66 GFR NonAA 41 GFR AA 50 eGFR 41 eGFR AA* 50 

 

                          2017 9:52 AM         COLOR YELLOW APPEARANCE CLOUDY SPEC GRAV <=1.005 pH 6.5 PROTEIN

 NEGATIVE GLUCOSE NEGATIVE mg/dLKETONE NEGATIVE BILIRUBIN NEGATIVE BLOOD SMALL 
NITRITE POSITIVE [facility] SCREEN LARGE MICRO INDICATED? SEE BELOW WBC/-300 
RBC/HPF 5-10 CASTS/LPF NEGATIVE /LPFCRYSTALS NEGATIVE MUCOUS THRDS NEGATIVE 
BACTERIA 2++ EPITH CELLS 1+ SQUAMOUS /HPFTRICHOMONAS NEGATIVE YEAST NEGATIVE 
CULT SET UP? YES 

 

                          2017 10:41 AM         COLOR YELLOW APPEARANCE CLEAR SPEC GRAV <=1.005 pH 6.0 PROTEIN

 NEGATIVE GLUCOSE NEGATIVE mg/dLKETONE NEGATIVE BILIRUBIN NEGATIVE BLOOD 
NEGATIVE NITRITE NEGATIVE [facility] SCREEN TRACE MICRO INDICATED? SEE BELOW WBC/HPF 
0-5 RBC/HPF RARE CASTS/LPF NEGATIVE /LPFCRYSTALS NEGATIVE MUCOUS THRDS NEGATIVE 
BACTERIA FEW EPITH CELLS 1+ SQUAMOUS /HPFTRICHOMONAS NEGATIVE YEAST NEGATIVE 
CULT SET UP? NO 







History Of Immunizations







       Name    Date Admin    Mfg Name    Mfg Code    Trade Name    Lot#    Route    Inj    Vis Given    Vis

 Pub                                    CVX

 

        Influenza    2008    Not Entered    NE      Not Entered            Not Entered    Not Entered

                    1            999

 

        X       12/19/2008    [employer]    MSD     Pneumovax 23            Intramuscular    Not Entered

                    1            999

 

           Influenza    10/15/2010    [employer]    NOV        Fluvirin > 12 Years    

[id number]     Intramuscular    Left Deltoid    10/15/2010    2009    999

 

          X         3/23/2015    [facility]    [facility]       Prevnar 13    [id number]    Intramuscular

                Right Gluteous Medius    3/23/2015       2013       109







History of Past Illness







                    Name                Date of Onset       Comments

 

                    Peritoneal Neoplasm, Malignant                         

 

                    Hyperlipidemia                           

 

                    Bipolar disorder, unspecified                         

 

                    Artificial opening status; colostomy                         

 

                    B12 deficiency                           

 

                    Anemia, Pernicious                         

 

                    Arthritis unspecified                         

 

                    cervical cancer                          

 

                    Artificial opening status; colostomy    2010  1:10PM     

 

                    Bipolar disorder, unspecified    2010  1:10PM     

 

                    Hyperlipidemia      2010  1:10PM     

 

                    Anemia, Pernicious    2010  1:10PM     

 

                    Postoperative Follow-Up    2010  1:55PM     

 

                    Postoperative Follow-Up    Mar  8 2010 10:57AM     

 

                    Artificial opening status; colostomy    Mar  8 2010  1:19PM     

 

                    Peritoneal Neoplasm, Malignant    Mar  8 2010  1:19PM     

 

                    Artificial opening status; colostomy    2010  1:40PM     

 

                    Hyperlipidemia      2010  1:40PM     

 

                    Anemia, Pernicious    2010  1:40PM     

 

                    Peritoneal Neoplasm, Malignant    2010  1:40PM     

 

                    Arthritis unspecified    2010  1:40PM     

 

                    Anemia of Chronic Illness    2010  1:40PM     

 

                    Tinea corporis      2010  3:17PM     

 

                    Bipolar disorder, unspecified    2010  1:33PM     

 

                    Hyperlipidemia      2010  1:33PM     

 

                    Anemia, Pernicious    2010  1:33PM     

 

                    Peritoneal Neoplasm, Malignant    2010  1:33PM     

 

                    B12 deficiency      2010  1:33PM     

 

                    Ethmoidal Sinusitis, Acute    Sep 21 2010  3:53PM     

 

                    Wheezing            Sep 21 2010  3:53PM     

 

                    Flu                 Oct 15 2010  1:40PM     

 

                    Bipolar disorder, unspecified    Oct 15 2010  1:42PM     

 

                    Hyperlipidemia      Oct 15 2010  1:42PM     

 

                    Anemia, Pernicious    Oct 15 2010  1:42PM     

 

                    Peritoneal Neoplasm, Malignant    Oct 15 2010  1:42PM     

 

                    Bipolar disorder, unspecified    2011 12:01PM     

 

                    Hyperlipidemia      2011 12:01PM     

 

                    Anemia, Pernicious    2011 12:01PM     

 

                    Peritoneal Neoplasm, Malignant    2011 12:01PM     

 

                    Bipolar disorder, unspecified    Apr 15 2011 10:55AM     

 

                    Major Depression    2011 10:11AM     

 

                    Bipolar Disorder    2011 10:11AM     

 

                    Cancer              May 10 2011  4:16PM     

 

                    Major Depression    May 10 2011  3:16PM     

 

                    Bipolar Disorder    May 10 2011  3:16PM     

 

                    Hypercalcemia       May 23 2011  2:47PM     

 

                    Bipolar disorder, unspecified    May 23 2011  2:47PM     

 

                    Colon Cancer, Personal History    May 23 2011  2:47PM     

 

                    Bipolar Disorder    May 31 2011  4:39PM     

 

                    Depressive Disorder    2011 10:01AM     

 

                    Vitamin B12 deficiency    2011 10:01AM     

 

                    Vitamin D Deficiency    2011  5:07PM     

 

                    Anemia, Vitamin B12 Deficiency    2011  5:07PM     

 

                    B12 deficiency      2011  3:56PM     

 

                    Routine gynecological examination    Aug  4 2011  9:08AM     

 

                    Screening Examination for Breast Cancer    Aug  4 2011  9:08AM     

 

                    Tinea Corporis      Aug  4 2011  9:08AM     

 

                    Depressive Disorder    Sep 23 2011  8:47AM     

 

                    Contact Dermatitis    Sep 23 2011  8:47AM     

 

                    Anemia, Pernicious    Sep 23 2011  8:47AM     

 

                    B12 deficiency      Sep 23 2011  8:47AM     

 

                    B12 deficiency      Sep 27 2011  2:58PM     

 

                    B12 deficiency      Oct 20 2011  2:34PM     

 

                    Flu                 Dec  9 2011  3:16PM     

 

                    B12 deficiency      Dec  9 2011  3:17PM     

 

                    B12 deficiency      2012  4:52PM     

 

                    B12 deficiency      2012 11:10AM     

 

                    B12 deficiency      2012  3:37PM     

 

                    B12 deficiency      May  3 2012  4:10PM     

 

                    B12 deficiency      2012  2:54PM     

 

                    B12 deficiency      2012 11:23AM     

 

                    B12 deficiency      Aug  9 2012  2:08PM     

 

                    B12 deficiency      Sep  6 2012  4:36PM     

 

                    B12 deficiency      Oct 16 2012 10:23AM     

 

                    Flu                 Feb  2013  3:11PM     

 

                    Bipolar disorder, unspecified    Feb  2013  2:48PM     

 

                    Anemia, Pernicious    Feb  2013  2:48PM     

 

                    B12 deficiency      Feb  2013  2:48PM     

 

                    Extrapyramidal abnormal movement disorder    Feb  2013  2:48PM     

 

                    B12 deficiency      Apr  3 2013 12:03PM     

 

                    Bipolar disorder, unspecified    May  7 2013  1:31PM     

 

                    Anemia, Pernicious    May  7 2013  1:31PM     

 

                    B12 deficiency      May  7 2013  1:31PM     

 

                    Extrapyramidal abnormal movement disorder    May  7 2013  1:31PM     

 

                    B12 deficiency      2013  3:42PM     

 

                    B12 deficiency      2013  1:31PM     

 

                    Hyperlipidemia      Aug  7 2013 10:37AM     

 

                    Vitamin D Deficiency    Aug  7 2013 10:37AM     

 

                    Bipolar disorder, unspecified    Aug  7 2013 10:37AM     

 

                    Anemia, Pernicious    Aug  7 2013 10:37AM     

 

                    B12 deficiency      Aug  7 2013 10:37AM     

 

                    B12 deficiency      Sep 25 2013 11:15AM     

 

                    B12 deficiency      Dec 11 2013  3:16PM     

 

                    B12 deficiency      Mar  6 2014  1:48PM     

 

                    B12 deficiency      May 21 2014  3:17PM     

 

                    Screening Examination for Breast Cancer    2014  3:23PM     

 

                    Periumbilical abdominal pain    2014  3:23PM     

 

                    B12 deficiency      Jul 10 2014  2:52PM     

 

                    Anemia, Vitamin B12 Deficiency    Aug 13 2014  4:50PM     

 

                    Bipolar disorder    Oct 16 2014 11:13AM     

 

                    Hyperlipidemia      Oct 16 2014 11:13AM     

 

                    Anemia, Pernicious    Oct 16 2014 11:13AM     

 

                    Peritoneal Neoplasm, Malignant    Oct 16 2014 11:13AM     

 

                    Screening breast examination    Oct 16 2014 11:13AM     

 

                    Weight loss         Oct 16 2014 11:13AM     

 

                    Anemia, Pernicious    Mar 23 2015  2:57PM     

 

                    B12 deficiency      Mar 23 2015  2:57PM     

 

                    Need for Prevnar vaccine    Mar 23 2015  2:57PM     

 

                    Bipolar disorder    Mar 23 2015  2:57PM     

 

                    Hyperlipidemia      Mar 23 2015  2:57PM     

 

                    Anemia, Pernicious    Mar 23 2015  2:57PM     

 

                    Peritoneal Neoplasm, Malignant    Mar 23 2015  2:57PM     

 

                    B12 deficiency      May  4 2015  4:48PM     

 

                    Hyperlipidemia      May 13 2015  9:56AM     

 

                    Anemia              May 13 2015  9:56AM     

 

                    Bipolar disorder    May 13 2015  9:56AM     

 

                    Bipolar disorder    May 14 2015  3:27PM     

 

                    Hyperlipidemia      May 14 2015  3:27PM     

 

                    Anemia, Pernicious    May 14 2015  3:27PM     

 

                    Peritoneal Neoplasm, Malignant    May 14 2015  3:27PM     

 

                    B12 deficiency      2015  2:20PM     

 

                    B12 deficiency      2015 11:34AM     

 

                    B12 deficiency      Aug 18 2015  9:06AM     

 

                    Tinea Corporis      Sep 18 2015  8:54AM     

 

                    B12 deficiency      Sep 18 2015  8:54AM     

 

                    B12 deficiency      2015 10:28AM     

 

                    Herpes zoster without complication    Dec  3 2015  9:52AM     

 

                    B12 deficiency      Dec 23 2015 11:21AM     

 

                    B12 deficiency      2016  4:51PM     

 

                    Vitamin B 12 deficiency    Mar 14 2016  5:35PM     

 

                    B12 deficiency      Mar 15 2016 12:14PM     

 

                    B12 deficiency      May  5 2016 11:30AM     

 

                    Edema               May  5 2016 11:30AM     

 

                    Dermatitis          May  5 2016 11:30AM     

 

                    Edema               May 17 2016  8:38AM     

 

                    Shortness of breath    May 17 2016  8:38AM     

 

                    Bilateral edema of lower extremity    2016  2:06PM     

 

                    B12 deficiency      2016  2:06PM     

 

                    B12 deficiency      2016 11:50AM     

 

                    B12 deficiency      2016 11:20AM     

 

                    Diarrhea            Aug  2 2016  3:13PM     

 

                    B12 deficiency      Aug 24 2016 11:10AM     

 

                    Encounter for screening mammogram for breast cancer    Aug 24 2016 11:44AM     

 

                    B12 deficiency      Sep 28 2016  2:35PM     

 

                    B12 deficiency      Dec 15 2016  2:02PM     

 

                    Dysuria             Dec 29 2016 12:14PM     

 

                    Hematuria           Fidencio  3 2017  1:33PM     

 

                    Constipation by delayed colonic transit    2017  1:52PM     

 

                    Ileus               2017  1:52PM     

 

                    UTI (urinary tract infection)    Fidencio 15 2017  3:39PM     

 

                    Acute cystitis with hematuria    2017 11:07AM     

 

                    B12 deficiency      2017 11:07AM     

 

                    B12 deficiency      2017 11:40AM     

 

                    B12 deficiency      2017  4:07PM     

 

                    Slurred speech      2017  3:07PM     

 

                    Vitamin B12 deficiency    2017  3:07PM     

 

                    Dysphagia, unspecified type    2017  3:07PM     

 

                    Hyperlipidemia      2017  3:07PM     

 

                    Dysuria             2017 12:01PM     

 

                    B12 deficiency      2017  2:08PM     

 

                    Dysuria             2017 10:58AM     

 

                    Hematuria           May 22 2017  1:36PM     

 

                    Depressive Disorder    May 22 2017  1:36PM     

 

                    Constipation        May 22 2017  1:36PM     

 

                    Fatigue             May 22 2017  1:36PM     

 

                    Urinary tract infection    May 30 2017  9:36AM     

 

                    Hypokalemia         May 30 2017 12:03PM     

 

                    Urinary tract infection    2017  4:36PM     

 

                    Bipolar disorder, unspecified    Aug 23 2017 11:05AM     

 

                    Anemia, Pernicious    Aug 23 2017 11:05AM     

 

                    B12 deficiency      Aug 23 2017 11:05AM     







Payers







           Insurance Name    Company Name    Plan Name    Plan Number    Policy Number    Policy Group

 Number                                 Start Date

 

                    Medicare RHC    Medicare RHC              [id number]              N/A

 

                          [employer]                 [id number]

                                                    

 

                    Medicare Part A    Medicare - Lab/Xray              [id number]              2006

 

                    Medicare Part B    Medicare [place]              [id number]              2006

 

                          [employer]    [employer]                 50 percent

                                                    2009

 

                    [facility]    [employer]              [id number]              N/A

 

                    Medicare Part A    Medicare Part A              [id number]              N/A

 

                    Medicare Part A    Medicare Part A              [id number]              2006









History of Encounters







                    Visit Date          Visit Type          Provider

 

                    2017           [facility]            Pranav Angel MD

 

                    2017           Office visit        Bhupinder Louise DO

 

                    2017           Nurse visit         Bhupinder Louise DO

 

                    2017           Office visit         

 

                    2017           Office visit        Bhupinder Louise DO

 

                    2017           Nurse visit         Bhupinder Louise DO

 

                    2017           Office visit        Radha Ontiveros APRN

 

                    1/15/2017           Office visit        Aj Tapia NP

 

                    2017            Office visit        Devin Masterson MD

 

                    2016          [facility]            Devin Masterson MD

 

                    12/15/2016          Nurse visit         Bhupinder Louise DO

 

                    2016           Nurse visit         Bret Forte APRN

 

                    2016           Nurse visit         Bhupinder Louise DO

 

                    2016            Office visit        Bhupinder Louise DO

 

                    2016           Nurse visit         Bhupinder Louise DO

 

                    2016           Office visit        Bret GREEN

 

                    2016            Office visit        Bhupinder Louise DO

 

                    3/15/2016           Nurse visit         Bhupinder Louise DO

 

                    2016            Nurse visit         Bhupinder Louise DO

 

                    2015          Nurse visit         Bhupinder Louise DO

 

                    12/3/2015           Office visit        Bhupinder Louise DO

 

                    2015          Nurse visit         Bhupinder Aspen DO

 

                    2015           Office visit        Bhupinder Aspen DO

 

                    2015           Nurse visit         Bhupinder Aspen DO

 

                    2015            Nurse visit         Bhupinder Aspen DO

 

                    2015            Nurse visit         Bhupinder Aspen DO

 

                    2015           Office visit        Bhupinder Aspen DO

 

                    2015            Nurse visit         Bhupinder Aspen DO

 

                    3/23/2015           Office visit        Bhupinder Aspen DO

 

                    10/16/2014          Office visit        Bhupinder Aspen DO

 

                    2014           Nurse visit         Radha Weston APRN

 

                    7/10/2014           Nurse visit         Bhupinder Aspen DO

 

                    2014           Office visit        Bhupinder Aspen DO

 

                    2014           Nurse visit         Bhupinder Aspen DO

 

                    3/6/2014            Nurse visit         Bhupinder Aspen DO

 

                    2014            [facility] SANDRO Lopez MD

 

                    2013          Nurse visit         Bhupinder Aspen DO

 

                    2013           Nurse visit         Bhupinder Aspen DO

 

                    2013            Office visit        Bhupinder Aspen DO

 

                    2013            Nurse visit         Bhupinder Aspen DO

 

                    2013            Nurse visit         Bhupinder Aspen DO

 

                    2013            Office visit        Bhupinder Aspen DO

 

                    4/3/2013            Nurse visit         Bhupinder Aspen DO

 

                    2013            Office visit        Bhupinder Aspen DO

 

                    10/16/2012          Nurse visit         Bhupinder Aspen DO

 

                    2012            Nurse visit         Bhupinder Aspen DO

 

                    2012            Voided              Bhupinder Aspen DO

 

                    2012            Nurse visit         Bhupinder Aspen DO

 

                    2012            Nurse visit         Bhupinder Aspen DO

 

                    2012           Nurse visit         Bhupinder Aspen DO

 

                    5/3/2012            Nurse visit         Bhupinder Aspen DO

 

                    2012           Nurse visit         Bhupinder Aspen DO

 

                    2012           Nurse visit         Bhupinder Aspen DO

 

                    2012           Nurse visit         Bhupinder Aspen DO

 

                    2011           Nurse visit         Bhupinder Aspen DO

 

                    10/20/2011          Nurse visit         Bhupinder Aspen DO

 

                    2011           Office visit        Bhupinder Aspen DO

 

                    2011           Nurse visit         Radha Ontiveros APRN

 

                    2011            Office visit        Bhupinder Aspen DO

 

                    2011           Nurse visit         Bhupinder Aspen DO

 

                    2011            Office visit        Bhupinder Aspen DO

 

                    2011           Office visit        Bhupinder Aspen DO

 

                    5/10/2011           Office visit        Bhupinder Louise DO

 

                    2011           Office visit        Bhupinder Louise DO

 

                    4/15/2011           Office visit        Devin Angel DO

 

                    2011           Office visit        Devin Angel DO

 

                    10/15/2010          Office visit        Devin Angel DO

 

                    2010           Office visit        Devin Angel DO

 

                    2010            Office visit        Devin Angel DO

 

                    2010           Office visit        Devin Angel DO

 

                    2010            Office visit        Devin Angel DO

 

                    3/8/2010            Office visit        Devin Masterson MD

 

                    2010            Surgery             Devin Masterson MD

 

                    2010            Office visit        Devin Angel DO

 

                    2010           Surgery             Devin Masterson MD

 

                    2010           Hospital            Devin Masterson MD

 

                    2010           [facility]            Devin Masterson MD

 

                    10/22/2009          Office visit        Devin Angel DO

## 2019-06-26 NOTE — XMS REPORT
MU2 Ambulatory Summary

                             Created on: 2016



Pauline Gan

External Reference #: [id number]

: 1950

Sex: Female



Demographics







                          Address                   [address]

[place]  [id number]

 

                          Home Phone                [phone]

 

                          Preferred Language        English

 

                          Marital Status            Legally 

 

                          Buddhism Affiliation     Unknown

 

                          Race                      White

 

                          Ethnic Group              Not  or 





Author







                          Author                    Bhupinder Louise

 

                          [facility]

 

                          Address                   [address]

[place]  [phone]



 

                          Phone                     [phone]







Care Team Providers







                    Care Team Member Name    Role                Phone

 

                    Bhupinder Louise    PCP                 Unavailable







Allergies and Adverse Reactions







                    Name                Reaction            Notes

 

                    NO KNOWN DRUG ALLERGIES                         







Plan of Treatment







             Planned Activity    Comments     Planned Date    Planned Time    Plan/Goal

 

             COMPLETE CBC W/AUTO DIFF WBC                 2013     12:00 AM      

 

             ASSAY OF LITHIUM                 2013     12:00 AM      

 

             METABOLIC PANEL TOTAL CA                 2013     12:00 AM      

 

             VITAMIN B-12                 2013     12:00 AM      

 

             ASSAY OF FOLIC ACID SERUM                 2013     12:00 AM      

 

             THER/PROPH/DIAG INJ SC/IM                 2013    12:00 AM      







Medications







                                        Active 

 

             Name         Start Date    Estimated Completion Date    SIG          Comments

 

                syringe with needle (disp) 1 mL 25 X 1" miscellaneous syringe    2011                        use 

for injection once a month               

 

                Latuda 20 mg oral tablet                                    take 1 tablet (20 mg) by oral route once daily with

 food (at least 350 calories)            

 

             pravastatin 40 mg oral tablet    3/30/2015                 TAKE 1 TABLET BY MOUTH DAILY     

 

                Namenda XR 28 mg oral capsule,sprinkle,ER 24hr    2015                       take 1 capsule (28

 mg) by oral route once daily            

 

                prednisone 10 mg oral tablet    12/3/2015                       take 2 tablets (20 mg) by oral route

 once daily for 4 days 1 tablet daily for 4 days 0.5 tablet daily for 4 days     









                                         

 

             Name         Start Date    Expiration Date    SIG          Comments

 

             Reglan 10mg    3/29/2010    2010    one ac and hs     

 

                Keflex 500 mg oral capsule    2010       10/1/2010       take 1 capsule (500 mg) by oral

 route every 6 hours for 10 days         

 

                Bactrim -160 mg oral tablet    2011       take 1 tablet by oral route

 every 12 hours for 7 days               

 

                triamcinolone acetonide 0.1 % topical cream    2011      apply a thin

 layer to the affected area(s) by topical route 2 times per day     

 

                sertraline 100 mg oral tablet    4/10/2012       5/10/2012       take 1.5 tablets by oral route

 daily for 30 days                       

 

                ergocalciferol (vitamin D2) 50,000 unit oral capsule    4/15/2013       2013       TAKE

 ONE CAPSULE BY MOUTH ONCE A WEEK        

 

                CYANOCOBALAM 1000MCGINJ 1000 milliliter    2013       INJECT 1ML INTRAMUSCULAR

 ONCE A MONTH                            

 

                pravastatin 40 mg oral tablet    3/25/2014       3/20/2015       TAKE ONE TABLET BY MOUTH EVERY

 DAY                                     

 

                          Zostavax (PF) 19,400 unit/0.65 mL subcutaneous suspension for reconstitution    3/23/2015

                    3/24/2015           inject 0.65 milliliter by subcutaneous route once     

 

                lithium carbonate 300 mg oral capsule    2015       take 1 capsule (300

 mg) by oral route 2 for 30 days         

 

                famciclovir 500 mg oral tablet    12/3/2015       12/10/2015      take 1 tablet (500 mg) by

 oral route every 8 hours for 7 days     









                                        Discontinued 

 

             Name         Start Date    Discontinued Date    SIG          Comments

 

                Tylenol 325 mg oral tablet                    2013        take 1 - 2 tablets (325 -650 mg) by oral

 route every 4-6 hours as needed         

 

                Calcium 600 + D(3) 600 mg(1,500mg) -400 unit oral tablet                    2011       take 1 tablet

 by oral route 2 times a day            no longer taking

 

                Vitamin B-12 1,000 mcg oral tablet extended release    2010       take 1

 tablet by oral route daily             no longer taking

 

                Antifungal (clotrimazole) 1 % topical cream    2010       apply to the 

affected and surrounding areas of skin by topical route 2 times per day morning 
and evening                              

 

                sertraline 100 mg oral tablet    5/10/2011       2011       take 2 tablets (200 mg) by 

oral route once daily                   discontinued by Dr. Serrano

 

                mirtazapine 15 mg oral tablet                    2011        take 1 tablet (15 mg) by oral route 

once daily before bedtime               Dr. Serrano

 

                mirtazapine 15 mg oral tablet                    2011        take 1 tablet (15 mg) by oral route 

once daily before bedtime               dc'd by Dr. Serrano

 

                Pristiq 50 mg oral tablet extended release 24 hr                    2013        take 1 tablet (50

 mg) by oral route once daily           Dr. Serrano

 

                Pristiq 50 mg oral tablet extended release 24 hr                    2013        take 1 tablet (50

 mg) by oral route once daily           dose updated

 

                Vitamin B-12 1,000 mcg/mL injection solution    2011        inject 1 milliliter

 (1,000 mcg) by intramuscular route once a month    on list already

 

                clotrimazole 1 % topical cream    2011        apply to the affected and surrounding

 areas of skin by topical route 2 times per day in the morning and evening     

 

                Vitamin D2 50,000 unit oral capsule    2011        take 1 capsule (50,000

 unit) by oral route once weekly        generic on list

 

                Pravachol 40 mg oral tablet    2012        take 1 tablet (40 mg) by oral 

route once daily for 90 days            generic on list

 

                lithium carbonate 300 mg oral capsule    2012        take 1 capsule by oral

 route daily                            dose updated

 

                Pristiq 100 mg oral tablet extended release 24 hr                    4/10/2012       take 1 and 1/2 

tablet (150 mg) by oral route once daily    Mental Health provider

 

                Pristiq 100 mg oral tablet extended release 24 hr                    4/10/2012       take 1 and 1/2 

tablet (150 mg) by oral route once daily    Discontinued by Dr Efrain Knight at [facility]

 

                hydroxyzine HCl 50 mg oral tablet    10/16/2014      2015       take 1 tablet (50 mg) 

by oral route at bedtime                 

 

                fluconazole 100 mg oral tablet    2015       12/3/2015       take 1 tablet (100 mg) by 

oral route once a week                   

 

                ketoconazole 2 % topical cream    2015       12/3/2015       apply to the affected area(s)

 by topical route 2 times per day        







Problem List







                    Description         Status              Onset

 

                    Artificial opening status; colostomy    Active               

 

                    Bipolar disorder, unspecified    Active               

 

                    Hyperlipidemia      Active               

 

                    Peritoneal Neoplasm, Malignant    Active               

 

                    Anemia, Pernicious    Active               

 

                    Arthritis unspecified    Active               

 

                    B12 deficiency      Active               







Vital Signs







      Date    Time    BP-Sys(mm[Hg]    BP-Lynn(mm[Hg])    HR(bpm)    RR(rpm)    Temp    WT    HT    HC    BMI

                    BSA                 BMI Percentile      O2 Sat(%)

 

        12/3/2015    9:50:00 AM    132 mmHg    70 mmHg    62 bpm    16 rpm    97.9 F    145 lbs    69 in

                          21.41 kg/m2    1.79 m2                   100 %

 

        2015    8:52:00 AM    132 mmHg    68 mmHg    52 bpm    20 rpm    97.8 F    141 lbs    69 in

                          20.8218 kg/m    1.7645 m                 100 %

 

        2015    3:25:00 PM    120 mmHg    62 mmHg    72 bpm    16 rpm    98.1 F    136 lbs    69 in

                          20.08 kg/m2    1.73 m2                   98 %

 

       3/23/2015    2:55:00 PM    130 mmHg    76 mmHg    68 bpm    18 rpm    97 F    140 lbs    69 in    

                20.6742 kg/m    1.7583 m                      98 %

 

        10/16/2014    11:11:00 AM    120 mmHg    66 mmHg    77 bpm    20 rpm    98 F    130 lbs    69 in

                          19.20 kg/m2    1.69 m2                   100 %

 

        2014    3:21:00 PM    130 mmHg    66 mmHg    63 bpm    18 rpm    97.2 F    160 lbs    69 in

                          23.6276 kg/m    1.8797 m                 99 %

 

        2013    10:35:00 AM    132 mmHg    70 mmHg    66 bpm    20 rpm    98.1 F    157 lbs    69 in

                          23.18 kg/m2    1.86 m2                    

 

        2013    1:29:00 PM    132 mmHg    70 mmHg    76 bpm    18 rpm    98.2 F    166 lbs    69 in 

                          24.5137 kg/m    1.9146 m                  

 

       2013    2:46:00 PM    128 mmHg    70 mmHg    76 bpm    16 rpm    98 F    160 lbs    69 in     

                23.63 kg/m2     1.88 m2                          

 

        2011    8:49:00 AM    128 mmHg    78 mmHg    70 bpm    18 rpm    97.9 F    164 lbs    69 in

                          24.2183 kg/m    1.903 m                  

 

     2011    1:31:00 PM    132 mmHg    68 mmHg    84 bpm         97 F    167 lbs                        

                                         

 

        2011    9:09:00 AM    128 mmHg    70 mmHg    72 bpm    18 rpm    98.2 F    163 lbs    64 in 

                          27.9786 kg/m    1.8272 m                  

 

       2011    10:01:00 AM    132 mmHg    70 mmHg    72 bpm    18 rpm    98.2 F    154 lbs             

                                                                 

 

       2011    2:47:00 PM    128 mmHg    70 mmHg    72 bpm    18 rpm    97.8 F    156 lbs             

                                                                 

 

       5/10/2011    3:16:00 PM    144 mmHg    80 mmHg    72 bpm    18 rpm    98.2 F    158 lbs             

                                                                 

 

        2011    10:11:00 AM    132 mmHg    70 mmHg    70 bpm    18 rpm    98.2 F    168 lbs    69 in

                          24.809 kg/m    1.9261 m                  

 

        4/15/2011    10:52:00 AM    110 mmHg    60 mmHg    75 bpm    16 rpm    97.5 F    172.375 lbs    

69 in                     25.46 kg/m2    1.95 m2                   100 %

 

        2011    11:43:00 AM    120 mmHg    82 mmHg    75 bpm    16 rpm    97.2 F    178.5 lbs    69

 in                       26.3596 kg/m    1.9854 m                 100 %

 

        10/15/2010    1:32:00 PM    120 mmHg    70 mmHg    80 bpm    18 rpm    96.6 F    177 lbs    69 in

                          26.14 kg/m2    1.98 m2                   100 %

 

        2010    3:50:00 PM    168 mmHg    100 mmHg    82 bpm    18 rpm    97.8 F    177.5 lbs    69

 in                       26.2119 kg/m    1.9798 m                 97 %

 

        2010    1:21:00 PM    140 mmHg    80 mmHg    59 bpm    16 rpm    97.6 F    173.25 lbs    69 

in                        25.58 kg/m2    1.96 m2                   100 %

 

        2010    3:02:00 PM    140 mmHg    80 mmHg    61 bpm    16 rpm    97.6 F    173.125 lbs    69

 in                       25.5658 kg/m    1.9553 m                 99 %

 

        2010    1:23:00 PM    130 mmHg    80 mmHg    66 bpm    16 rpm    96.8 F    173 lbs    69 in 

                          25.55 kg/m2    1.95 m2                   100 %

 

        2010    12:58:00 PM    130 mmHg    88 mmHg    75 bpm    16 rpm    98.4 F    172.25 lbs    69

 in                       25.4366 kg/m    1.9503 m                 100 %







Social History







                    Name                Description         Comments

 

                    denies alcohol use                         

 

                    denies smoking                           

 

                    Denies illicit substance abuse                         

 

                    retired                                 direct care

 

                    Single                                   

 

                    Exercises regularly                         

 

                    Attended some college                         

 

                    Tobacco             Never smoker         







History of Procedures







                    Date Ordered        Description         Order Status

 

                    2010 12:00 AM    COMPREHEN METABOLIC PANEL    Reviewed

 

                    2010 12:00 AM    COMPLETE CBC W/AUTO DIFF WBC    Reviewed

 

                    2010 12:00 AM    LIPID PANEL         Reviewed

 

                          2015 12:00 AM        B12 Injection, Up to 1000 Mcg NDC#4353-7665-99 [facility] 

                                        Reviewed

 

                    2011 12:00 AM    MAMMOGRAM SCREENING    Reviewed

 

                    2011 12:00 AM    CYTOPATH C/V THIN LAYER    Reviewed

 

                    2011 12:00 AM    B12 Injection 1 cc NDC#[address]-48    Reviewed

 

                    2015 12:00 AM    THER/PROPH/DIAG INJ SC/IM    Reviewed

 

                    2015 12:00 AM    B12 Injection, Up to 1000 Mcg NDC#6342-6972-58    Reviewed

 

                    2011 12:00 AM    THER/PROPH/DIAG INJ SC/IM    Reviewed

 

                    2011 12:00 AM    B12 Injection([place]) Ndc#3281-0040-89-    Reviewed

 

                    2015 12:00 AM    THER/PROPH/DIAG INJ SC/IM    Reviewed

 

                    2015 12:00 AM    B12 Injection, Up to 1000 Mcg NDC#8027-1655-88    Reviewed

 

                    10/20/2011 12:00 AM    THER/PROPH/DIAG INJ SC/IM    Reviewed

 

                    10/20/2011 12:00 AM    B12 Injection(Arabella) Ndc#6375-3710-36-    Reviewed

 

                    2016 12:00 AM    THER/PROPH/DIAG INJ SC/IM    Reviewed

 

                    2016 12:00 AM    B12 Injection, Up to 1000 Mcg NDC#8785-4002-57    Reviewed

 

                    2011 12:00 AM    ***Immunization administration, Medicare flu    Reviewed

 

                    2011 12:00 AM    Fluzone ** MEDICARE Only **    Reviewed

 

                    2011 12:00 AM    THER/PROPH/DIAG INJ SC/IM    Reviewed

 

                    2011 12:00 AM    B12 Injection ([facility]) Ndc#0323-7942-46    Reviewed

 

                    2012 12:00 AM    THER/PROPH/DIAG INJ SC/IM    Reviewed

 

                    2012 12:00 AM    B12 Injection ([employer] [facility]) Ndc#6423-3745-35    Reviewed

 

                    2012 12:00 AM    THER/PROPH/DIAG INJ SC/IM    Reviewed

 

                    2012 12:00 AM    B12 Injection([place]ella) Ndc#3499-9025-68-    Reviewed

 

                    5/3/2012 12:00 AM    THER/PROPH/DIAG INJ SC/IM    Reviewed

 

                    5/3/2012 12:00 AM    B12 Injection([place]ella) Ndc#8204-3775-13-    Reviewed

 

                    2012 12:00 AM    IMMUNOTHERAPY INJECTIONS    Reviewed

 

                    2012 12:00 AM    B12 Injection(Arabella) Ndc#6611-8926-77-    Reviewed

 

                    2012 12:00 AM    THER/PROPH/DIAG INJ SC/IM    Reviewed

 

                    2012 12:00 AM    B12 Injection, Up to 1000 Mcg NDC#7883-4173-20    Reviewed

 

                    2012 12:00 AM    THER/PROPH/DIAG INJ SC/IM    Reviewed

 

                    2012 12:00 AM    B12 Injection, Up to 1000 Mcg NDC#0981-8442-60    Reviewed

 

                    2012 12:00 AM    THER/PROPH/DIAG INJ SC/IM    Reviewed

 

                    2012 12:00 AM    B12 Injection, Up to 1000 Mcg NDC#6042-5315-71    Reviewed

 

                    10/16/2012 12:00 AM    THER/PROPH/DIAG INJ SC/IM    Reviewed

 

                    10/16/2012 12:00 AM    B12 Injection, Up to 1000 Mcg NDC#9295-5464-28    Reviewed

 

                    2010 12:00 AM    COMPREHEN METABOLIC PANEL    Reviewed

 

                    2010 12:00 AM    COMPLETE CBC W/AUTO DIFF WBC    Reviewed

 

                    2010 12:00 AM    LIPID PANEL         Reviewed

 

                    2013 12:00 AM    Flu Injection 3 Years And Above NDC# [address]-01  [facility]    Reviewed



 

                    2013 12:00 AM    COMPLETE CBC W/AUTO DIFF WBC    Reviewed

 

                    2013 12:00 AM    ASSAY OF LITHIUM    Reviewed

 

                    2013 12:00 AM    METABOLIC PANEL TOTAL CA    Reviewed

 

                    4/3/2013 12:00 AM    THER/PROPH/DIAG INJ SC/IM    Reviewed

 

                    4/3/2013 12:00 AM    B12 Injection, Up to 1000 Mcg NDC#0539-7829-82    Reviewed

 

                    2013 12:00 AM    THER/PROPH/DIAG INJ SC/IM    Reviewed

 

                    2013 12:00 AM    B12 Injection, Up to 1000 Mcg NDC#6851-4427-21    Reviewed

 

                    2013 12:00 AM    THER/PROPH/DIAG INJ SC/IM    Reviewed

 

                    2013 12:00 AM    B12 Injection, Up to 1000 Mcg NDC#2856-3293-63    Reviewed

 

                    2013 12:00 AM    LIPID PANEL         Reviewed

 

                    2013 12:00 AM    VITAMIN D 25 HYDROXY    Reviewed

 

                    2013 12:00 AM    THER/PROPH/DIAG INJ SC/IM    Reviewed

 

                    3/6/2014 12:00 AM    THER/PROPH/DIAG INJ SC/IM    Reviewed

 

                    2014 12:00 AM    THER/PROPH/DIAG INJ SC/IM    Reviewed

 

                    2014 12:00 AM    B12 Injection, Up to 1000 Mcg NDC#7653-3932-43    Reviewed

 

                    2010 12:00 AM    SKIN FUNGI CULTURE    Reviewed

 

                    10/9/2010 12:00 AM    COMPREHEN METABOLIC PANEL    Reviewed

 

                    10/9/2010 12:00 AM    LIPID PANEL         Reviewed

 

                    2010 12:00 AM    THER/PROPH/DIAG INJ SC/IM    Reviewed

 

                    2010 12:00 AM    B12 Injection Ndc#[address]-97 ([place])    Reviewed

 

                    2010 12:00 AM    THER/PROPH/DIAG INJ SC/IM    Reviewed

 

                    2010 12:00 AM    Kenalog 40 Mg Im-Ndc#[address]-20 (Angel)    Reviewed

 

                    10/15/2010 12:00 AM    FLU VACCINE 3 YRS & > IM    Reviewed

 

                    10/15/2010 12:00 AM    Admin.Of M/C Cov.Vaccine-Flu Vacc.    Reviewed

 

                    1/15/2011 12:00 AM    COMPLETE CBC W/AUTO DIFF WBC    Reviewed

 

                    1/15/2011 12:00 AM    COMPREHEN METABOLIC PANEL    Reviewed

 

                    1/15/2011 12:00 AM    LIPID PANEL         Reviewed

 

                    2014 12:00 AM    MAMMOGRAM SCREENING    Reviewed

 

                    2014 12:00 AM    Screening mammography, bilateral    Reviewed

 

                    7/10/2014 12:00 AM    THER/PROPH/DIAG INJ SC/IM    Reviewed

 

                    7/10/2014 12:00 AM    B12 Injection, Up to 1000 Mcg NDC#1817-7609-52    Reviewed

 

                    2011 12:00 AM    COMPLETE CBC W/AUTO DIFF WBC    Reviewed

 

                    2011 12:00 AM    COMPREHEN METABOLIC PANEL    Reviewed

 

                    2011 12:00 AM    LIPID PANEL         Reviewed

 

                    2014 12:00 AM    B12 Injection, Up to 1000 Mcg NDC#4557-5291-76    Reviewed

 

                    10/19/2014 12:00 AM    MAMMOGRAM SCREENING    Reviewed

 

                    10/19/2014 12:00 AM    Screening mammography, bilateral    Reviewed

 

                    10/16/2014 12:00 AM    COMPLETE CBC W/AUTO DIFF WBC    Reviewed

 

                    10/16/2014 12:00 AM    COMPREHEN METABOLIC PANEL    Reviewed

 

                    10/16/2014 12:00 AM    IMMUNOASSAY TUMOR     Reviewed

 

                    10/16/2014 12:00 AM    LIPID PANEL         Reviewed

 

                    10/16/2014 12:00 AM    ASSAY OF LITHIUM    Reviewed

 

                    10/16/2014 12:00 AM    MAMMOGRAM SCREENING    Reviewed

 

                    2011 12:00 AM    ASSAY OF PARATHORMONE    Reviewed

 

                    2011 12:00 AM    VITAMIN D 25 HYDROXY    Reviewed

 

                    2011 12:00 AM    ASSAY OF LITHIUM    Reviewed

 

                    2011 12:00 AM    METABOLIC PANEL TOTAL CA    Reviewed

 

                    2011 12:00 AM    CT HEAD/BRAIN W/O & W/DYE    Reviewed

 

                    3/23/2015 12:00 AM    PNEUMOCOCCAL VACC 13 GLENDY IM    Reviewed

 

                    3/23/2015 12:00 AM    Vitamin B12 injection    Reviewed

 

                    2011 12:00 AM    ASSAY OF LITHIUM    Reviewed

 

                    2011 12:00 AM    B12 Injection Ndc#[address]-86  [facility]    Reviewed

 

                    2015 12:00 AM    THER/PROPH/DIAG INJ SC/IM    Reviewed

 

                    2015 12:00 AM    B12 Injection, Up to 1000 Mcg NDC#4224-2495-04    Reviewed

 

                    2015 12:00 AM    COMPLETE CBC W/AUTO DIFF WBC    Reviewed

 

                    2015 12:00 AM    COMPREHEN METABOLIC PANEL    Reviewed

 

                    2015 12:00 AM    LIPID PANEL         Reviewed

 

                    2015 12:00 AM    ASSAY OF LITHIUM    Reviewed

 

                    2011 12:00 AM    VIT D 1 25-DIHYDROXY    Reviewed

 

                    2011 12:00 AM    VITAMIN B-12        Reviewed

 

                    2015 12:00 AM    B12 Injection, Up to 1000 Mcg NDC#9228-7103-40    Reviewed

 

                    2015 12:00 AM    THER/PROPH/DIAG INJ SC/IM    Reviewed

 

                    2015 12:00 AM    B12 Injection, Up to 1000 Mcg NDC#7603-1820-85    Reviewed

 

                    2011 12:00 AM    THER/PROPH/DIAG INJ SC/IM    Reviewed

 

                    2011 12:00 AM    B12 Injection ([employer]) Ndc#7998-7857-27    Reviewed

 

                    2015 12:00 AM    THER/PROPH/DIAG INJ SC/IM    Reviewed

 

                    2015 12:00 AM    B12 Injection, Up to 1000 Mcg NDC#7717-6589-81    Reviewed







Results Summary







                          Data and Description      Results

 

                          2004 12:00 AM        Colonoscopy-Women and Men over 50 Normal 

 

                          2008 12:00 AM         Pap Smear Declined 

 

                          10/7/2009 12:00 AM        Cholest Cry Stone Ql .0 %LDLc SerPl-mCnc 123.0 mg/dLHDLc

 SerPl-mCnc 34.0 mg/dLTrigl SerPl-mCnc 190.0 mg/dLGlucose SerPl-mCnc 78.0 mg/dL

 

                          2009 12:00 AM        Mammogram -Women over 40 Normal HIV1+2 Ab Ser Ql no risk 

 

                          2010 8:47 AM         Dexa Bone Scan Refused Aspirin reccommended Contraindication 



 

                          2010 8:48 AM         Depression Done 

 

                          2010 12:00 AM         Foot Exam-Diabetic Done 

 

                          2010 12:00 AM         Cholest Cry Stone Ql .0 %LDLc SerPl-mCnc 126.0 mg/dLGlucose

 SerPl-mCnc 102.0 mg/dL

 

                          2010 8:45 AM          TRIGLYCERIDES 122.0 mg/dLCHOLESTEROL 186.0 mg/dLHDL 36.0 mg/dLLDL

 (CALC) 126.0 mg/dLGLUCOSE 102.0 mg/dLSODIUM 143.0 mmol/LPOTASSIUM 3.70 
mmol/LCHLORIDE 111.0 mmol/LCO2 23.0 mmol/LBUN 10.0 mg/dLCREATININE 0.80 
mg/dLSGOT/AST 12.0 IU/LSGPT/ALT 11.0 IU/LALK PHOS 65.0 IU/LTOTAL PROTEIN 7.20 
g/dLALBUMIN 3.90 g/dLTOTAL BILI 0.50 mg/dLCALCIUM 10.20 mg/dLeGFR >60 
mL/min/1.73 m2WBC 5.7 RBC 3.26 HGB 10.60 g/dLHCT 31.70 %MCV 97.0 fLMCH 32.50 
pgMCHC 33.40 g/dLRDW CV 13.30 %MPV 9.70 fLPLT 287 %NEUT 62.90 %%LYMP 21.80 
%%MONO 9.90 %%EOS 5.0 %%BASO 0.40 %#NEUT 3.56 #LYMP 1.23 #MONO 0.56 #EOS 0.28 
#BASO 0.02 

 

                          2010 12:00 AM        Glucose SerPl-mCnc 96.0 mg/dLCholest Cry Stone Ql .0 %LDLc

 SerPl-mCnc 146.0 mg/dL

 

                          2010 8:26 AM         TRIGLYCERIDES 106.0 mg/dLCHOLESTEROL 199.0 mg/dLHDL 32.0 mg/dLLDL

 (CALC) 146.0 mg/dLGLUCOSE 96.0 mg/dLSODIUM 143.0 mmol/LPOTASSIUM 4.0 
mmol/LCHLORIDE 113.0 mmol/LCO2 24.0 mmol/LBUN 13.0 mg/dLCREATININE 1.0 
mg/dLSGOT/AST 11.0 IU/LSGPT/ALT 6.0 IU/LALK PHOS 56.0 IU/LTOTAL PROTEIN 6.60 
g/dLALBUMIN 3.80 g/dLTOTAL BILI 0.50 mg/dLCALCIUM 9.30 mg/dLeGFR 57 

 

                          10/6/2010 12:00 AM        Cholest Cry Stone Ql .0 %LDLc SerPl-mCnc 111.0 mg/dLGlucose

 SerPl-mCnc 81.0 mg/dL

 

                          10/6/2010 2:45 PM         TRIGLYCERIDES 123.0 mg/dLCHOLESTEROL 178.0 mg/dLHDL 42.0 mg/dLLDL

 (CALC) 111.0 mg/dLGLUCOSE 81.0 mg/dLSODIUM 139.0 mmol/LPOTASSIUM 4.10 
mmol/LCHLORIDE 106.0 mmol/LCO2 24.0 mmol/LBUN 13.0 mg/dLCREATININE 0.90 
mg/dLSGOT/AST 13.0 IU/LSGPT/ALT 11.0 IU/LALK PHOS 61.0 IU/LTOTAL PROTEIN 7.10 
g/dLALBUMIN 3.90 g/dLTOTAL BILI 0.30 mg/dLCALCIUM 9.30 mg/dLeGFR >60 mL/min/1.73
 m2WBC 6.9 RBC 3.59 HGB 11.50 g/dLHCT 35.30 %MCV 98.0 fLMCH 32.0 pgMCHC 32.60 
g/dLRDW CV 12.90 %MPV 9.90 fLPLT 311 %NEUT 64.90 %%LYMP 22.50 %%MONO 7.20 %%EOS 
5.10 %%BASO 0.30 %#NEUT 4.45 #LYMP 1.54 #MONO 0.49 #EOS 0.35 #BASO 0.02 

 

                          2011 12:00 AM         Mammogram -Women over 40 Ordered 

 

                          2011 10:25 AM        TRIGLYCERIDES 111.0 mg/dLCHOLESTEROL 195.0 mg/dLHDL 43.0 mg/dLLDL

 (CALC) 130.0 mg/dLWBC 5.3 RBC 3.76 HGB 12.0 g/dLHCT 37.80 %.0 fLH 
31.90 pgMCHC 31.70 g/dLRDW CV 13.0 %MPV 9.70 fLPLT 259 %NEUT 69.0 %%LYMP 17.60 
%%MONO 8.30 %%EOS 4.70 %%BASO 0.40 %#NEUT 3.63 #LYMP 0.93 #MONO 0.44 #EOS 0.25 
#BASO 0.02 GLUCOSE 102.0 mg/dLSODIUM 146.0 mmol/LPOTASSIUM 4.20 mmol/LCHLORIDE 
113.0 mmol/LCO2 23.0 mmol/LBUN 15.0 mg/dLCREATININE 1.0 mg/dLSGOT/AST 12.0 
IU/LSGPT/ALT 17.0 IU/LALK PHOS 60.0 IU/LTOTAL PROTEIN 6.90 g/dLALBUMIN 4.20 
g/dLTOTAL BILI 0.40 mg/dLCALCIUM 9.70 mg/dLeGFR 57 

 

                          2011 11:49 AM        Cholest Cry Stone Ql .0 %LDLc SerPl-mCnc 130.0 mg/dLHDLc

 SerPl-mCnc 43.0 mg/dLTrigl SerPl-mCnc 111.0 mg/dLGlucose SerPl-mCnc 102.0 mg/dL

 

                          2011 11:52 AM        Pap Smear Declined 

 

                          2011 11:28 AM        Lithium 2.080 mmol/LGLUCOSE 102.0 mg/dLSODIUM 135.0 mmol/LPOTASSIUM

 3.90 mmol/LCHLORIDE 106.0 mmol/LCO2 21.0 mmol/LBUN 12.0 mg/dLCREATININE 1.30 
mg/dLCALCIUM 10.70 mg/dLeGFR 42 

 

                          2011 8:58 AM          Lithium 0.690 mmol/L

 

                          2011 2:38 PM         VITAMIN B12 3483.0 pg/mL

 

                          2013 3:35 PM          WBC 5.1 RBC 3.73 HGB 11.70 g/dLHCT 36.40 %MCV 98.0 fLMCH 31.40

 pgMCHC 32.10 g/dLRDW CV 13.10 %MPV 9.80 fLPLT 224 %NEUT 66.80 %%LYMP 19.10 
%%MONO 9.0 %%EOS 4.90 %%BASO 0.20 %#NEUT 3.42 #LYMP 0.98 #MONO 0.46 #EOS 0.25 
#BASO 0.01 GLUCOSE 88.0 mg/dLSODIUM 141.0 mmol/LPOTASSIUM 4.10 mmol/LCHLORIDE 
110.0 mmol/LCO2 22.0 mmol/LBUN 22.0 mg/dLCREATININE 1.10 mg/dLCALCIUM 9.80 
mg/dLeGFR 50 Lithium 0.760 mmol/L

 

                          2013 11:02 AM        TRIGLYCERIDES 106.0 mg/dLCHOLESTEROL 181.0 mg/dLHDL 46.0 mg/dLLDL

 (CALC) 114.0 mg/dLVITAMIN D 41.10 ng/mL

 

                          10/17/2014 10:10 AM       WBC 5.0 RBC 3.66 HGB 11.60 g/dLHCT 36.80 %.0 fLMCH 31.70

 pgMCHC 31.50 g/dLRDW CV 13.50 %MPV 10.10 fLPLT 209 %NEUT 69.20 %%LYMP 21.0 
%%MONO 6.40 %%EOS 3.20 %%BASO 0.20 %#NEUT 3.46 #LYMP 1.05 #MONO 0.32 #EOS 0.16 
#BASO 0.01 GLUCOSE 100.0 mg/dLSODIUM 148.0 mmol/LPOTASSIUM 3.90 mmol/LCHLORIDE 
114.0 mmol/LCO2 26.0 mmol/LBUN 12.0 mg/dLCREATININE 1.20 mg/dLSGOT/AST 9.0 
IU/LSGPT/ALT <6 IU/LALK PHOS 82.0 IU/LTOTAL PROTEIN 6.90 g/dLALBUMIN 4.0 
g/dLTOTAL BILI 0.40 mg/dLCALCIUM 10.50 mg/dLeGFR 45 TRIGLYCERIDES 96.0 
mg/dLCHOLESTEROL 155.0 mg/dLHDL 38.0 mg/dLLDL (CALC) 98.0 mg/dLLithium 0.850 
mmol/LCancer Antigen (CA) 125 8.30 U/mL

 

                          2015 10:25 AM        Lithium 0.790 mmol/LWBC 4.8 RBC 3.44 HGB 11.0 g/dLHCT 35.20 

%.0 fLMCH 32.0 pgMCHC 31.30 g/dLRDW CV 14.0 %MPV 9.30 fLPLT 210 %NEUT 
70.80 %%LYMP 17.20 %%MONO 8.10 %%EOS 3.50 %%BASO 0.40 %#NEUT 3.41 #LYMP 0.83 
#MONO 0.39 #EOS 0.17 #BASO 0.02 TRIGLYCERIDES 107.0 mg/dLCHOLESTEROL 174.0 
mg/dLHDL 43.0 mg/dLLDL (CALC) 110.0 mg/dLGLUCOSE 90.0 mg/dLSODIUM 145.0 
mmol/LPOTASSIUM 3.80 mmol/LCHLORIDE 115.0 mmol/LCO2 24.0 mmol/LBUN 17.0 mg
/dLCREATININE 1.30 mg/dLSGOT/AST 18.0 IU/LSGPT/ALT 17.0 IU/LALK PHOS 56.0 
IU/LTOTAL PROTEIN 6.70 g/dLALBUMIN 3.90 g/dLTOTAL BILI 0.40 mg/dLCALCIUM 9.80 
mg/dLeGFR 41 

 

                          2015 8:50 AM        WBC 5.8 RBC 3.29 HGB 10.70 g/dLHCT 34.0 %.0 fLMCH 32.50

 pgMCHC 31.50 g/dLRDW CV 13.60 %MPV 9.60 fLPLT 223 %NEUT 69.60 %%LYMP 18.90 
%%MONO 8.50 %%EOS 2.80 %%BASO 0.20 %#NEUT 4.03 #LYMP 1.09 #MONO 0.49 #EOS 0.16 
#BASO 0.01 Lithium 0.620 mmol/LGLUCOSE 83.0 mg/dLSODIUM 139.0 mmol/LPOTASSIUM 
3.90 mmol/LCHLORIDE 109.0 mmol/LCO2 22.0 mmol/LBUN 19.0 mg/dLCREATININE 1.40 
mg/dLSGOT/AST 19.0 IU/LSGPT/ALT 21.0 IU/LALK PHOS 55.0 IU/LTOTAL PROTEIN 6.50 
g/dLALBUMIN 3.90 g/dLTOTAL BILI 0.50 mg/dLCALCIUM 9.60 mg/dLeGFR 38 
TRIGLYCERIDES 121.0 mg/dLCHOLESTEROL 192.0 mg/dLHDL 51.0 mg/dLLDL 121.0 mg/dLTSH
 1.210 uIU/mL







History Of Immunizations







       Name    Date Admin    Mfg Name    Mfg Code    Trade Name    Lot#    Route    Inj    Vis Given    Vis

 Pub                                    CVX

 

        Influenza    2008    Not Entered    NE      Not Entered            Not Entered    Not Entered

                    1            999

 

          Pneumococcal    2008    [employer]    MSD       Pneumovax 23              Intramuscular

                Not Entered     1        999

 

           Influenza    10/15/2010    Dubizzle Arely.    NOV        Fluvirin > 12 Years    

[id number]     Intramuscular    Left Deltoid    10/15/2010    2009    999

 

          Pneumococcal    3/23/2015    [employer]    [facility]       Prevnar 13    [id number]    Intramuscular

                Right Gluteous Medius    3/23/2015       2013       109







History of Past Illness







                    Name                Date of Onset       Comments

 

                    Peritoneal Neoplasm, Malignant                         

 

                    Hyperlipidemia                           

 

                    Bipolar disorder, unspecified                         

 

                    Artificial opening status; colostomy                         

 

                    B12 deficiency                           

 

                    Anemia, Pernicious                         

 

                    Arthritis unspecified                         

 

                    cervical cancer                          

 

                    Artificial opening status; colostomy    2010  1:10PM     

 

                    Bipolar disorder, unspecified    2010  1:10PM     

 

                    Hyperlipidemia      2010  1:10PM     

 

                    Anemia, Pernicious    2010  1:10PM     

 

                    Postoperative Follow-Up    2010  1:55PM     

 

                    Postoperative Follow-Up    Mar  8 2010 10:57AM     

 

                    Artificial opening status; colostomy    Mar  8 2010  1:19PM     

 

                    Peritoneal Neoplasm, Malignant    Mar  8 2010  1:19PM     

 

                    Artificial opening status; colostomy    2010  1:40PM     

 

                    Hyperlipidemia      2010  1:40PM     

 

                    Anemia, Pernicious    2010  1:40PM     

 

                    Peritoneal Neoplasm, Malignant    2010  1:40PM     

 

                    Arthritis unspecified    2010  1:40PM     

 

                    Anemia of Chronic Illness    2010  1:40PM     

 

                    Tinea corporis      2010  3:17PM     

 

                    Bipolar disorder, unspecified    2010  1:33PM     

 

                    Hyperlipidemia      2010  1:33PM     

 

                    Anemia, Pernicious    2010  1:33PM     

 

                    Peritoneal Neoplasm, Malignant    2010  1:33PM     

 

                    B12 deficiency      2010  1:33PM     

 

                    Ethmoidal Sinusitis, Acute    Sep 21 2010  3:53PM     

 

                    Wheezing            Sep 21 2010  3:53PM     

 

                    Flu                 Oct 15 2010  1:40PM     

 

                    Bipolar disorder, unspecified    Oct 15 2010  1:42PM     

 

                    Hyperlipidemia      Oct 15 2010  1:42PM     

 

                    Anemia, Pernicious    Oct 15 2010  1:42PM     

 

                    Peritoneal Neoplasm, Malignant    Oct 15 2010  1:42PM     

 

                    Bipolar disorder, unspecified    2011 12:01PM     

 

                    Hyperlipidemia      2011 12:01PM     

 

                    Anemia, Pernicious    2011 12:01PM     

 

                    Peritoneal Neoplasm, Malignant    2011 12:01PM     

 

                    Bipolar disorder, unspecified    Apr 15 2011 10:55AM     

 

                    Major Depression    2011 10:11AM     

 

                    Bipolar Disorder    2011 10:11AM     

 

                    Cancer              May 10 2011  4:16PM     

 

                    Major Depression    May 10 2011  3:16PM     

 

                    Bipolar Disorder    May 10 2011  3:16PM     

 

                    Hypercalcemia       May 23 2011  2:47PM     

 

                    Bipolar disorder, unspecified    May 23 2011  2:47PM     

 

                    Colon Cancer, Personal History    May 23 2011  2:47PM     

 

                    Bipolar Disorder    May 31 2011  4:39PM     

 

                    Depressive Disorder    2011 10:01AM     

 

                    Vitamin B12 deficiency    2011 10:01AM     

 

                    Vitamin D Deficiency    2011  5:07PM     

 

                    Anemia, Vitamin B12 Deficiency    2011  5:07PM     

 

                    B12 deficiency      2011  3:56PM     

 

                    Routine gynecological examination    Aug  4 2011  9:08AM     

 

                    Screening Examination for Breast Cancer    Aug  4 2011  9:08AM     

 

                    Tinea Corporis      Aug  4 2011  9:08AM     

 

                    Depressive Disorder    Sep 23 2011  8:47AM     

 

                    Contact Dermatitis    Sep 23 2011  8:47AM     

 

                    Anemia, Pernicious    Sep 23 2011  8:47AM     

 

                    B12 deficiency      Sep 23 2011  8:47AM     

 

                    B12 deficiency      Sep 27 2011  2:58PM     

 

                    B12 deficiency      Oct 20 2011  2:34PM     

 

                    Flu                 Dec  9 2011  3:16PM     

 

                    B12 deficiency      Dec  9 2011  3:17PM     

 

                    B12 deficiency      2012  4:52PM     

 

                    B12 deficiency      2012 11:10AM     

 

                    B12 deficiency      2012  3:37PM     

 

                    B12 deficiency      May  3 2012  4:10PM     

 

                    B12 deficiency      2012  2:54PM     

 

                    B12 deficiency      2012 11:23AM     

 

                    B12 deficiency      Aug  9 2012  2:08PM     

 

                    B12 deficiency      Sep  6 2012  4:36PM     

 

                    B12 deficiency      Oct 16 2012 10:23AM     

 

                    Flu                 b  2013  3:11PM     

 

                    Bipolar disorder, unspecified    Feb  2013  2:48PM     

 

                    Anemia, Pernicious    Feb  2013  2:48PM     

 

                    B12 deficiency      Feb  2013  2:48PM     

 

                    Extrapyramidal abnormal movement disorder    Feb  2013  2:48PM     

 

                    B12 deficiency      Apr  3 2013 12:03PM     

 

                    Bipolar disorder, unspecified    May  7 2013  1:31PM     

 

                    Anemia, Pernicious    May  7 2013  1:31PM     

 

                    B12 deficiency      May  7 2013  1:31PM     

 

                    Extrapyramidal abnormal movement disorder    May  7 2013  1:31PM     

 

                    B12 deficiency      2013  3:42PM     

 

                    B12 deficiency      2013  1:31PM     

 

                    Hyperlipidemia      Aug  7 2013 10:37AM     

 

                    Vitamin D Deficiency    Aug  7 2013 10:37AM     

 

                    Bipolar disorder, unspecified    Aug  7 2013 10:37AM     

 

                    Anemia, Pernicious    Aug  7 2013 10:37AM     

 

                    B12 deficiency      Aug  7 2013 10:37AM     

 

                    B12 deficiency      Sep 25 2013 11:15AM     

 

                    B12 deficiency      Dec 11 2013  3:16PM     

 

                    B12 deficiency      Mar  6 2014  1:48PM     

 

                    B12 deficiency      May 21 2014  3:17PM     

 

                    Screening Examination for Breast Cancer    2014  3:23PM     

 

                    Periumbilical abdominal pain    2014  3:23PM     

 

                    B12 deficiency      Jul 10 2014  2:52PM     

 

                    Anemia, Vitamin B12 Deficiency    Aug 13 2014  4:50PM     

 

                    Bipolar disorder    Oct 16 2014 11:13AM     

 

                    Hyperlipidemia      Oct 16 2014 11:13AM     

 

                    Anemia, Pernicious    Oct 16 2014 11:13AM     

 

                    Peritoneal Neoplasm, Malignant    Oct 16 2014 11:13AM     

 

                    Screening breast examination    Oct 16 2014 11:13AM     

 

                    Weight loss         Oct 16 2014 11:13AM     

 

                    Anemia, Pernicious    Mar 23 2015  2:57PM     

 

                    B12 deficiency      Mar 23 2015  2:57PM     

 

                    Need for Prevnar vaccine    Mar 23 2015  2:57PM     

 

                    Bipolar disorder    Mar 23 2015  2:57PM     

 

                    Hyperlipidemia      Mar 23 2015  2:57PM     

 

                    Anemia, Pernicious    Mar 23 2015  2:57PM     

 

                    Peritoneal Neoplasm, Malignant    Mar 23 2015  2:57PM     

 

                    B12 deficiency      May  4 2015  4:48PM     

 

                    Hyperlipidemia      May 13 2015  9:56AM     

 

                    Anemia              May 13 2015  9:56AM     

 

                    Bipolar disorder    May 13 2015  9:56AM     

 

                    Bipolar disorder    May 14 2015  3:27PM     

 

                    Hyperlipidemia      May 14 2015  3:27PM     

 

                    Anemia, Pernicious    May 14 2015  3:27PM     

 

                    Peritoneal Neoplasm, Malignant    May 14 2015  3:27PM     

 

                    B12 deficiency      2015  2:20PM     

 

                    B12 deficiency      2015 11:34AM     

 

                    B12 deficiency      Aug 18 2015  9:06AM     

 

                    Tinea Corporis      Sep 18 2015  8:54AM     

 

                    B12 deficiency      Sep 18 2015  8:54AM     

 

                    B12 deficiency      2015 10:28AM     

 

                    Herpes zoster without complication    Dec  3 2015  9:52AM     

 

                    B12 deficiency      Dec 23 2015 11:21AM     

 

                    B12 deficiency      2016  4:51PM     







Payers







           Insurance Name    Company Name    Plan Name    Plan Number    Policy Number    Policy Group

 Number                                 Start Date

 

                    Medicare Part A    Medicare Part A              [id number]              2006



 

                          [employer]    [employer]                 [id number]

                                                    

 

                    Medicare Part B    Medicare [place]              [id number]              2006

 

                          [employer]    [employer]                 50 percent

                                                    2009

 

                    [facility]    [employer]              [id number]              N/A

 

                    Medicare Part A    Medicare Part A              [id number]              N/A







History of Encounters







                    Visit Date          Visit Type          Provider

 

                    2016            Nurse visit         Bhupinder Louise DO

 

                    2015          Nurse visit         Bhupinder Louise DO

 

                    12/3/2015           Office visit        Bhupinder Louise DO

 

                    2015          Nurse visit         Bhupinder Louise DO

 

                    2015           Office visit        Bhupinder Louise DO

 

                    2015           Nurse visit         Bhupinder Louise DO

 

                    2015            Nurse visit         Bhupinder Louise DO

 

                    2015            Nurse visit         Bhupinder Louise DO

 

                    2015           Office visit        Bhupinder Louise DO

 

                    2015            Nurse visit         Bhupinder Louise DO

 

                    3/23/2015           Office visit        Bhupinder Louise DO

 

                    10/16/2014          Office visit        Bhupinder Louise DO

 

                    2014           Nurse visit         Radha GREEN

 

                    7/10/2014           Nurse visit         Bhupinder Louise DO

 

                    2014           Office visit        Bhupinder Louise DO

 

                    2014           Nurse visit         Bhupinder Louise DO

 

                    3/6/2014            Nurse visit         Bhupinder Aspen DO

 

                    2014            [facility]            EARNEST Lopez MD

 

                    2013          Nurse visit         Bhupinder Aspen DO

 

                    2013           Nurse visit         Bhupinder Aspen DO

 

                    2013            Office visit        Bhupinder Aspen DO

 

                    2013            Nurse visit         Bhupinder Aspen DO

 

                    2013            Nurse visit         Bhupinder Aspen DO

 

                    2013            Office visit        Bhupinder Aspen DO

 

                    4/3/2013            Nurse visit         Bhupinder Aspen DO

 

                    2013            Office visit        Bhupinder Aspen DO

 

                    10/16/2012          Nurse visit         Bhupinder Aspen DO

 

                    2012            Nurse visit         Bhupinder Aspen DO

 

                    2012            Voided              Bhupinder Aspen DO

 

                    2012            Nurse visit         Bhupinder Aspen DO

 

                    2012            Nurse visit         Bhupinder Aspen DO

 

                    2012           Nurse visit         Bhupinder Aspen DO

 

                    5/3/2012            Nurse visit         Bhupinder Aspen DO

 

                    2012           Nurse visit         Bhupinder Aspen DO

 

                    2012           Nurse visit         Bhupinder Aspen DO

 

                    2012           Nurse visit         Bhupinder Aspen DO

 

                    2011           Nurse visit         Bhupinder Aspen DO

 

                    10/20/2011          Nurse visit         Bhupinder Aspen DO

 

                    2011           Office visit        Bhupinder Aspen DO

 

                    2011           Nurse visit         Radha GREEN

 

                    2011            Office visit        Bhupinder Aspen DO

 

                    2011           Nurse visit         Bhupinder Aspen DO

 

                    2011            Office visit        Bhupinder Aspen DO

 

                    2011           Office visit        Bhupinder Aspen DO

 

                    5/10/2011           Office visit        Bhupinder Aspen DO

 

                    2011           Office visit        Bhupinder Aspen DO

 

                    4/15/2011           Office visit        Devin Angel DO

 

                    2011           Office visit        Devin Angel DO

 

                    10/15/2010          Office visit        Devin Angel DO

 

                    2010           Office visit        Devin Agnel DO

 

                    2010            Office visit        Devin Angel DO

 

                    2010           Office visit        Devin Angel DO

 

                    2010            Office visit        Devin Angel DO

 

                    3/8/2010            Office visit        Devin Masterson MD

 

                    2010            Surgery             Devin Masterson MD

 

                    2010            Office visit        Devin Angel DO

 

                    2010           Surgery             Devin Masterson MD

 

                    2010           [facility]            Devin Masterson MD

 

                    2010           [facility]            Devin Masterson MD

 

                    10/22/2009          Office visit        Devin Angel DO

## 2019-06-26 NOTE — XMS REPORT
MU2 Ambulatory Summary

                             Created on: 2016



Pauline Gan

External Reference #: [id number]

: 1950

Sex: Female



Demographics







                          Address                   [address]

[place]  [id number]

 

                          Home Phone                [phone]

 

                          Preferred Language        English

 

                          Marital Status            Legally 

 

                          Jain Affiliation     Unknown

 

                          Race                      White

 

                          Ethnic Group              Not  or 





Author







                          Author                    Bhupinder Louise

 

                          [facility]

 

                          Address                   [address]

[place]  [id number]



 

                          Phone                     [phone]







Care Team Providers







                    Care Team Member Name    Role                Phone

 

                    Bhupinder Louise    PCP                 Unavailable







Allergies and Adverse Reactions







                    Name                Reaction            Notes

 

                    NO KNOWN DRUG ALLERGIES                         







Plan of Treatment







             Planned Activity    Comments     Planned Date    Planned Time    Plan/Goal

 

             THER/PROPH/DIAG INJ SC/IM                 2016    12:00 AM      

 

             COMPLETE CBC W/AUTO DIFF WBC                 2013     12:00 AM      

 

             ASSAY OF LITHIUM                 2013     12:00 AM      

 

             METABOLIC PANEL TOTAL CA                 2013     12:00 AM      

 

             VITAMIN B-12                 2013     12:00 AM      

 

             ASSAY OF FOLIC ACID SERUM                 2013     12:00 AM      

 

             THER/PROPH/DIAG INJ SC/IM                 2013    12:00 AM      







Medications







                                        Active 

 

             Name         Start Date    Estimated Completion Date    SIG          Comments

 

                Latuda 20 mg oral tablet                                    take 1 tablet (20 mg) by oral route once daily with

 food (at least 350 calories)            

 

             pravastatin 40 mg oral tablet    3/30/2015                 TAKE 1 TABLET BY MOUTH DAILY     

 

                Namenda XR 28 mg oral capsule,sprinkle,ER 24hr    2015                       take 1 capsule (28

 mg) by oral route once daily            

 

                Namenda XR 28 mg oral capsule,sprinkle,ER 24hr    2016                       take 1 capsule (28

 mg) by oral route once daily            

 

                triamcinolone acetonide 0.1 % topical cream    2016                        apply a thin layer to 

the affected area(s) by topical route 2 times per day     

 

                furosemide 40 mg oral tablet    2016      take 1 tablet (40 mg) by oral

 route once daily                        

 

                potassium chloride 10 mEq oral tablet extended release    2016                       take 1 tablet

 (10 meq) by oral route once daily       

 

             pravastatin 40 mg oral tablet    2016                 TAKE 1 TABLET BY MOUTH DAILY     

 

                Vitamin B-12 1,000 mcg/mL injection solution    2016                       inject 1 milliliter 

(1,000 mcg) by intramuscular route once a month     









                                         

 

             Name         Start Date    Expiration Date    SIG          Comments

 

             Reglan 10mg    3/29/2010    2010    one ac and hs     

 

                Keflex 500 mg oral capsule    2010       10/1/2010       take 1 capsule (500 mg) by oral

 route every 6 hours for 10 days         

 

                Bactrim -160 mg oral tablet    2011       take 1 tablet by oral route

 every 12 hours for 7 days               

 

                triamcinolone acetonide 0.1 % topical cream    2011      apply a thin

 layer to the affected area(s) by topical route 2 times per day     

 

                sertraline 100 mg oral tablet    4/10/2012       5/10/2012       take 1.5 tablets by oral route

 daily for 30 days                       

 

                ergocalciferol (vitamin D2) 50,000 unit oral capsule    4/15/2013       2013       TAKE

 ONE CAPSULE BY MOUTH ONCE A WEEK        

 

                CYANOCOBALAM 1000MCGINJ 1000 milliliter    2013       INJECT 1ML INTRAMUSCULAR

 ONCE A MONTH                            

 

                pravastatin 40 mg oral tablet    3/25/2014       3/20/2015       TAKE ONE TABLET BY MOUTH EVERY

 DAY                                     

 

                          Zostavax (PF) 19,400 unit/0.65 mL subcutaneous suspension for reconstitution    3/23/2015

                    3/24/2015           inject 0.65 milliliter by subcutaneous route once     

 

                famciclovir 500 mg oral tablet    12/3/2015       12/10/2015      take 1 tablet (500 mg) by

 oral route every 8 hours for 7 days     

 

                Cipro 500 mg oral tablet    2016       take 1 tablet (500 mg) by oral route

 2 times per day for 5 days              









                                        Discontinued 

 

             Name         Start Date    Discontinued Date    SIG          Comments

 

                Tylenol 325 mg oral tablet                    2013        take 1 - 2 tablets (325 -650 mg) by oral

 route every 4-6 hours as needed         

 

                Calcium 600 + D(3) 600 mg(1,500mg) -400 unit oral tablet                    2011       take 1 tablet

 by oral route 2 times a day            no longer taking

 

                Vitamin B-12 1,000 mcg oral tablet extended release    2010       take 1

 tablet by oral route daily             no longer taking

 

                Antifungal (clotrimazole) 1 % topical cream    2010       apply to the 

affected and surrounding areas of skin by topical route 2 times per day morning 
and evening                              

 

                sertraline 100 mg oral tablet    5/10/2011       2011       take 2 tablets (200 mg) by 

oral route once daily                   discontinued by Dr. Serrano

 

                mirtazapine 15 mg oral tablet                    2011        take 1 tablet (15 mg) by oral route 

once daily before bedtime               Dr. Serrano

 

                mirtazapine 15 mg oral tablet                    2011        take 1 tablet (15 mg) by oral route 

once daily before bedtime               dc'd by Dr. Serrano

 

                Pristiq 50 mg oral tablet extended release 24 hr                    2013        take 1 tablet (50

 mg) by oral route once daily           Dr. Serrano

 

                Pristiq 50 mg oral tablet extended release 24 hr                    2013        take 1 tablet (50

 mg) by oral route once daily           dose updated

 

                Vitamin B-12 1,000 mcg/mL injection solution    2011        inject 1 milliliter

 (1,000 mcg) by intramuscular route once a month    on list already

 

                    syringe with needle 1 mL 25 gauge x 1" miscellaneous syringe    2011

                          use for injection once a month     

 

                clotrimazole 1 % topical cream    2011        apply to the affected and surrounding

 areas of skin by topical route 2 times per day in the morning and evening     

 

                Vitamin D2 50,000 unit oral capsule    2011        take 1 capsule (50,000

 unit) by oral route once weekly        generic on list

 

                Pravachol 40 mg oral tablet    2012        take 1 tablet (40 mg) by oral 

route once daily for 90 days            generic on list

 

                lithium carbonate 300 mg oral capsule    2012        take 1 capsule by oral

 route daily                            dose updated

 

                Pristiq 100 mg oral tablet extended release 24 hr                    4/10/2012       take 1 and 1/2 

tablet (150 mg) by oral route once daily    Mental Health provider

 

                Pristiq 100 mg oral tablet extended release 24 hr                    4/10/2012       take 1 and 1/2 

tablet (150 mg) by oral route once daily    Discontinued by Dr Efrain Knight at [facility]

 

                hydroxyzine HCl 50 mg oral tablet    10/16/2014      2015       take 1 tablet (50 mg) 

by oral route at bedtime                 

 

                lithium carbonate 300 mg oral capsule    2015       take 1 capsule (300

 mg) by oral route 2 for 30 days         

 

                fluconazole 100 mg oral tablet    2015       12/3/2015       take 1 tablet (100 mg) by 

oral route once a week                   

 

                ketoconazole 2 % topical cream    2015       12/3/2015       apply to the affected area(s)

 by topical route 2 times per day        

 

                prednisone 10 mg oral tablet    12/3/2015       2016        take 2 tablets (20 mg) by oral

 route once daily for 4 days 1 tablet daily for 4 days 0.5 tablet daily for 4 
days                                     







Problem List







                    Description         Status              Onset

 

                    Artificial opening status; colostomy    Active               

 

                    Bipolar disorder, unspecified    Active               

 

                    Hyperlipidemia      Active               

 

                    Peritoneal Neoplasm, Malignant    Active               

 

                    Anemia, Pernicious    Active               

 

                    Arthritis unspecified    Active               

 

                    B12 deficiency      Active               







Vital Signs







      Date    Time    BP-Sys(mm[Hg]    BP-Lynn(mm[Hg])    HR(bpm)    RR(rpm)    Temp    WT    HT    HC    BMI

                    BSA                 BMI Percentile      O2 Sat(%)

 

        2016    2:04:00 PM    142 mmHg    86 mmHg    68 bpm    16 rpm    98.5 F    166 lbs    63 in

                          29.41 kg/m2    1.83 m2                   100 %

 

        2016    11:27:00 AM    148 mmHg    78 mmHg    90 bpm    20 rpm    98.2 F    153 lbs    69 in

                          22.5939 kg/m    1.8381 m                 96 %

 

        12/3/2015    9:50:00 AM    132 mmHg    70 mmHg    62 bpm    16 rpm    97.9 F    145 lbs    69 in

                          21.41 kg/m2    1.79 m2                   100 %

 

        2015    8:52:00 AM    132 mmHg    68 mmHg    52 bpm    20 rpm    97.8 F    141 lbs    69 in

                          20.8218 kg/m    1.7645 m                 100 %

 

        2015    3:25:00 PM    120 mmHg    62 mmHg    72 bpm    16 rpm    98.1 F    136 lbs    69 in

                          20.08 kg/m2    1.73 m2                   98 %

 

       3/23/2015    2:55:00 PM    130 mmHg    76 mmHg    68 bpm    18 rpm    97 F    140 lbs    69 in    

                20.6742 kg/m    1.7583 m                      98 %

 

        10/16/2014    11:11:00 AM    120 mmHg    66 mmHg    77 bpm    20 rpm    98 F    130 lbs    69 in

                          19.20 kg/m2    1.69 m2                   100 %

 

        2014    3:21:00 PM    130 mmHg    66 mmHg    63 bpm    18 rpm    97.2 F    160 lbs    69 in

                          23.6276 kg/m    1.8797 m                 99 %

 

        2013    10:35:00 AM    132 mmHg    70 mmHg    66 bpm    20 rpm    98.1 F    157 lbs    69 in

                          23.18 kg/m2    1.86 m2                    

 

        2013    1:29:00 PM    132 mmHg    70 mmHg    76 bpm    18 rpm    98.2 F    166 lbs    69 in 

                          24.5137 kg/m    1.9146 m                  

 

       2013    2:46:00 PM    128 mmHg    70 mmHg    76 bpm    16 rpm    98 F    160 lbs    69 in     

                23.63 kg/m2     1.88 m2                          

 

        2011    8:49:00 AM    128 mmHg    78 mmHg    70 bpm    18 rpm    97.9 F    164 lbs    69 in

                          24.2183 kg/m    1.903 m                  

 

     2011    1:31:00 PM    132 mmHg    68 mmHg    84 bpm         97 F    167 lbs                        

                                         

 

        2011    9:09:00 AM    128 mmHg    70 mmHg    72 bpm    18 rpm    98.2 F    163 lbs    64 in 

                          27.9786 kg/m    1.8272 m                  

 

       2011    10:01:00 AM    132 mmHg    70 mmHg    72 bpm    18 rpm    98.2 F    154 lbs             

                                                                 

 

       2011    2:47:00 PM    128 mmHg    70 mmHg    72 bpm    18 rpm    97.8 F    156 lbs             

                                                                 

 

       5/10/2011    3:16:00 PM    144 mmHg    80 mmHg    72 bpm    18 rpm    98.2 F    158 lbs             

                                                                 

 

        2011    10:11:00 AM    132 mmHg    70 mmHg    70 bpm    18 rpm    98.2 F    168 lbs    69 in

                          24.809 kg/m    1.9261 m                  

 

        4/15/2011    10:52:00 AM    110 mmHg    60 mmHg    75 bpm    16 rpm    97.5 F    172.375 lbs    

69 in                     25.46 kg/m2    1.95 m2                   100 %

 

        2011    11:43:00 AM    120 mmHg    82 mmHg    75 bpm    16 rpm    97.2 F    178.5 lbs    69

 in                       26.3596 kg/m    1.9854 m                 100 %

 

        10/15/2010    1:32:00 PM    120 mmHg    70 mmHg    80 bpm    18 rpm    96.6 F    177 lbs    69 in

                          26.14 kg/m2    1.98 m2                   100 %

 

        2010    3:50:00 PM    168 mmHg    100 mmHg    82 bpm    18 rpm    97.8 F    177.5 lbs    69

 in                       26.2119 kg/m    1.9798 m                 97 %

 

        2010    1:21:00 PM    140 mmHg    80 mmHg    59 bpm    16 rpm    97.6 F    173.25 lbs    69 

in                        25.58 kg/m2    1.96 m2                   100 %

 

        2010    3:02:00 PM    140 mmHg    80 mmHg    61 bpm    16 rpm    97.6 F    173.125 lbs    69

 in                       25.5658 kg/m    1.9553 m                 99 %

 

        2010    1:23:00 PM    130 mmHg    80 mmHg    66 bpm    16 rpm    96.8 F    173 lbs    69 in 

                          25.55 kg/m2    1.95 m2                   100 %

 

        2010    12:58:00 PM    130 mmHg    88 mmHg    75 bpm    16 rpm    98.4 F    172.25 lbs    69

 in                       25.4366 kg/m    1.9503 m                 100 %







Social History







                    Name                Description         Comments

 

                    denies alcohol use                         

 

                    denies smoking                           

 

                    Denies illicit substance abuse                         

 

                    retired                                 direct care

 

                    Single                                   

 

                    Exercises regularly                         

 

                    Attended some college                         

 

                    Tobacco             Never smoker         







History of Procedures







                    Date Ordered        Description         Order Status

 

                    2010 12:00 AM    COMPREHEN METABOLIC PANEL    Reviewed

 

                    2010 12:00 AM    COMPLETE CBC W/AUTO DIFF WBC    Reviewed

 

                    2010 12:00 AM    LIPID PANEL         Reviewed

 

                          2015 12:00 AM        B12 Injection, Up to 1000 Mcg NDC#2107-0123-90 [facility] 

                                        Reviewed

 

                    2011 12:00 AM    MAMMOGRAM SCREENING    Reviewed

 

                    2011 12:00 AM    CYTOPATH C/V THIN LAYER    Reviewed

 

                    2011 12:00 AM    B12 Injection 1 cc NDC#[address]-24    Reviewed

 

                    2015 12:00 AM    THER/PROPH/DIAG INJ SC/IM    Reviewed

 

                    2015 12:00 AM    B12 Injection, Up to 1000 Mcg NDC#9880-6032-11    Reviewed

 

                    2011 12:00 AM    THER/PROPH/DIAG INJ SC/IM    Reviewed

 

                    2011 12:00 AM    B12 Injection([facility]) Ndc#1864-3288-74-    Reviewed

 

                    2015 12:00 AM    THER/PROPH/DIAG INJ SC/IM    Reviewed

 

                    2015 12:00 AM    B12 Injection, Up to 1000 Mcg NDC#2985-0162-16    Reviewed

 

                    10/20/2011 12:00 AM    THER/PROPH/DIAG INJ SC/IM    Reviewed

 

                    10/20/2011 12:00 AM    B12 Injection([place]) Ndc#8033-4952-45-    Reviewed

 

                    2016 12:00 AM    THER/PROPH/DIAG INJ SC/IM    Reviewed

 

                    2016 12:00 AM    B12 Injection, Up to 1000 Mcg NDC#0941-9255-51    Reviewed

 

                    3/14/2016 12:00 AM    VITAMIN B-12        Reviewed

 

                    3/15/2016 12:00 AM    THER/PROPH/DIAG INJ SC/IM    Reviewed

 

                    3/15/2016 12:00 AM    B12 Injection, Up to 1000 Mcg NDC#8685-6789-80    Reviewed

 

                    2011 12:00 AM    ***Immunization administration, Medicare flu    Reviewed

 

                    2011 12:00 AM    Fluzone ** MEDICARE Only **    Reviewed

 

                    2011 12:00 AM    THER/PROPH/DIAG INJ SC/IM    Reviewed

 

                    2011 12:00 AM    B12 Injection ([facility]) Ndc#9935-1004-43    Reviewed

 

                    2016 12:00 AM    B12 Injection, Up to 1000 Mcg NDC#2529-0240-52 [facility]    

Reviewed

 

                    2016 12:00 AM    TTE W/DOPPLER COMPLETE    Reviewed

 

                    2016 12:00 AM    EXTREMITY STUDY     Returned

 

                          2016 12:00 AM        B12 Injection, Up to 1000 Mcg NDC#2092-9150-92 [facility] 

                                        Reviewed

 

                    2016 12:00 AM    THER/PROPH/DIAG INJ SC/IM    Reviewed

 

                    2016 12:00 AM    THER/PROPH/DIAG INJ SC/IM    Reviewed

 

                    2016 12:00 AM    B12 Injection, Up to 1000 Mcg NDC#9467-7540-47    Reviewed

 

                    2012 12:00 AM    THER/PROPH/DIAG INJ SC/IM    Reviewed

 

                    2012 12:00 AM    B12 Injection ([employer] [facility]) Ndc#0923-4309-37    Reviewed

 

                    2012 12:00 AM    THER/PROPH/DIAG INJ SC/IM    Reviewed

 

                    2012 12:00 AM    B12 Injection([place]) Ndc#4544-3216-36-    Reviewed

 

                    5/3/2012 12:00 AM    THER/PROPH/DIAG INJ SC/IM    Reviewed

 

                    5/3/2012 12:00 AM    B12 Injection([place]) Ndc#6955-9664-22-    Reviewed

 

                    2012 12:00 AM    IMMUNOTHERAPY INJECTIONS    Reviewed

 

                    2012 12:00 AM    B12 Injection([place]) Ndc#6126-2733-67-    Reviewed

 

                    2012 12:00 AM    THER/PROPH/DIAG INJ SC/IM    Reviewed

 

                    2012 12:00 AM    B12 Injection, Up to 1000 Mcg NDC#7637-8012-61    Reviewed

 

                    2012 12:00 AM    THER/PROPH/DIAG INJ SC/IM    Reviewed

 

                    2012 12:00 AM    B12 Injection, Up to 1000 Mcg NDC#9907-8965-95    Reviewed

 

                    2012 12:00 AM    THER/PROPH/DIAG INJ SC/IM    Reviewed

 

                    2012 12:00 AM    B12 Injection, Up to 1000 Mcg NDC#2384-0467-91    Reviewed

 

                    10/16/2012 12:00 AM    THER/PROPH/DIAG INJ SC/IM    Reviewed

 

                    10/16/2012 12:00 AM    B12 Injection, Up to 1000 Mcg NDC#8577-4232-61    Reviewed

 

                    2010 12:00 AM    COMPREHEN METABOLIC PANEL    Reviewed

 

                    2010 12:00 AM    COMPLETE CBC W/AUTO DIFF WBC    Reviewed

 

                    2010 12:00 AM    LIPID PANEL         Reviewed

 

                    2013 12:00 AM    Flu Injection 3 Years And Above NDC# [address]-98  [facility]    Reviewed



 

                    2013 12:00 AM    COMPLETE CBC W/AUTO DIFF WBC    Reviewed

 

                    2013 12:00 AM    ASSAY OF LITHIUM    Reviewed

 

                    2013 12:00 AM    METABOLIC PANEL TOTAL CA    Reviewed

 

                    4/3/2013 12:00 AM    THER/PROPH/DIAG INJ SC/IM    Reviewed

 

                    4/3/2013 12:00 AM    B12 Injection, Up to 1000 Mcg NDC#7879-4555-08    Reviewed

 

                    2013 12:00 AM    THER/PROPH/DIAG INJ SC/IM    Reviewed

 

                    2013 12:00 AM    B12 Injection, Up to 1000 Mcg NDC#5571-9907-08    Reviewed

 

                    2013 12:00 AM    THER/PROPH/DIAG INJ SC/IM    Reviewed

 

                    2013 12:00 AM    B12 Injection, Up to 1000 Mcg NDC#4308-1174-49    Reviewed

 

                    2013 12:00 AM    LIPID PANEL         Reviewed

 

                    2013 12:00 AM    VITAMIN D 25 HYDROXY    Reviewed

 

                    2013 12:00 AM    THER/PROPH/DIAG INJ SC/IM    Reviewed

 

                    3/6/2014 12:00 AM    THER/PROPH/DIAG INJ SC/IM    Reviewed

 

                    2014 12:00 AM    THER/PROPH/DIAG INJ SC/IM    Reviewed

 

                    2014 12:00 AM    B12 Injection, Up to 1000 Mcg NDC#9385-0896-91    Reviewed

 

                    2010 12:00 AM    SKIN FUNGI CULTURE    Reviewed

 

                    10/9/2010 12:00 AM    COMPREHEN METABOLIC PANEL    Reviewed

 

                    10/9/2010 12:00 AM    LIPID PANEL         Reviewed

 

                    2010 12:00 AM    THER/PROPH/DIAG INJ SC/IM    Reviewed

 

                    2010 12:00 AM    B12 Injection Ndc#[address]-27 ([place])    Reviewed

 

                    2010 12:00 AM    THER/PROPH/DIAG INJ SC/IM    Reviewed

 

                    2010 12:00 AM    Kenalog 40 Mg Im-Ndc#[address]-75 ([place])    Reviewed

 

                    10/15/2010 12:00 AM    FLU VACCINE 3 YRS & > IM    Reviewed

 

                    10/15/2010 12:00 AM    Admin.Of M/C Cov.Vaccine-Flu Vacc.    Reviewed

 

                    1/15/2011 12:00 AM    COMPLETE CBC W/AUTO DIFF WBC    Reviewed

 

                    1/15/2011 12:00 AM    COMPREHEN METABOLIC PANEL    Reviewed

 

                    1/15/2011 12:00 AM    LIPID PANEL         Reviewed

 

                    2014 12:00 AM    MAMMOGRAM SCREENING    Reviewed

 

                    2014 12:00 AM    Screening mammography, bilateral    Reviewed

 

                    7/10/2014 12:00 AM    THER/PROPH/DIAG INJ SC/IM    Reviewed

 

                    7/10/2014 12:00 AM    B12 Injection, Up to 1000 Mcg NDC#3656-6454-70    Reviewed

 

                    2011 12:00 AM    COMPLETE CBC W/AUTO DIFF WBC    Reviewed

 

                    2011 12:00 AM    COMPREHEN METABOLIC PANEL    Reviewed

 

                    2011 12:00 AM    LIPID PANEL         Reviewed

 

                    2014 12:00 AM    B12 Injection, Up to 1000 Mcg NDC#5084-4482-15    Reviewed

 

                    10/19/2014 12:00 AM    MAMMOGRAM SCREENING    Reviewed

 

                    10/19/2014 12:00 AM    Screening mammography, bilateral    Reviewed

 

                    10/16/2014 12:00 AM    COMPLETE CBC W/AUTO DIFF WBC    Reviewed

 

                    10/16/2014 12:00 AM    COMPREHEN METABOLIC PANEL    Reviewed

 

                    10/16/2014 12:00 AM    IMMUNOASSAY TUMOR     Reviewed

 

                    10/16/2014 12:00 AM    LIPID PANEL         Reviewed

 

                    10/16/2014 12:00 AM    ASSAY OF LITHIUM    Reviewed

 

                    10/16/2014 12:00 AM    MAMMOGRAM SCREENING    Reviewed

 

                    2011 12:00 AM    ASSAY OF PARATHORMONE    Reviewed

 

                    2011 12:00 AM    VITAMIN D 25 HYDROXY    Reviewed

 

                    2011 12:00 AM    ASSAY OF LITHIUM    Reviewed

 

                    2011 12:00 AM    METABOLIC PANEL TOTAL CA    Reviewed

 

                    2011 12:00 AM    CT HEAD/BRAIN W/O & W/DYE    Reviewed

 

                    3/23/2015 12:00 AM    PNEUMOCOCCAL VACC 13 GLENDY IM    Reviewed

 

                    3/23/2015 12:00 AM    Vitamin B12 injection    Reviewed

 

                    2011 12:00 AM    ASSAY OF LITHIUM    Reviewed

 

                    2011 12:00 AM    B12 Injection Ndc#[address]-94  [place]    Reviewed

 

                    2015 12:00 AM    THER/PROPH/DIAG INJ SC/IM    Reviewed

 

                    2015 12:00 AM    B12 Injection, Up to 1000 Mcg NDC#1956-3900-97    Reviewed

 

                    2015 12:00 AM    COMPLETE CBC W/AUTO DIFF WBC    Reviewed

 

                    2015 12:00 AM    COMPREHEN METABOLIC PANEL    Reviewed

 

                    2015 12:00 AM    LIPID PANEL         Reviewed

 

                    2015 12:00 AM    ASSAY OF LITHIUM    Reviewed

 

                    2011 12:00 AM    VIT D 1 25-DIHYDROXY    Reviewed

 

                    2011 12:00 AM    VITAMIN B-12        Reviewed

 

                    2015 12:00 AM    B12 Injection, Up to 1000 Mcg NDC#5589-6415-37    Reviewed

 

                    2015 12:00 AM    THER/PROPH/DIAG INJ SC/IM    Reviewed

 

                    2015 12:00 AM    B12 Injection, Up to 1000 Mcg NDC#3836-3267-09    Reviewed

 

                    2011 12:00 AM    THER/PROPH/DIAG INJ SC/IM    Reviewed

 

                    2011 12:00 AM    B12 Injection ([facility]) Ndc#3464-1522-55    Reviewed

 

                    2015 12:00 AM    THER/PROPH/DIAG INJ SC/IM    Reviewed

 

                    2015 12:00 AM    B12 Injection, Up to 1000 Mcg NDC#1856-0523-78    Reviewed







Results Summary







                          Data and Description      Results

 

                          2004 12:00 AM        Colonoscopy-Women and Men over 50 Normal 

 

                          2008 12:00 AM         Pap Smear Declined 

 

                          10/7/2009 12:00 AM        Cholest Cry Stone Ql .0 %LDLc SerPl-mCnc 123.0 mg/dLHDLc

 SerPl-mCnc 34.0 mg/dLTrigl SerPl-mCnc 190.0 mg/dLGlucose SerPl-mCnc 78.0 mg/dL

 

                          2009 12:00 AM        Mammogram -Women over 40 Normal HIV1+2 Ab Ser Ql no risk 

 

                          2010 8:47 AM         Dexa Bone Scan Refused Aspirin reccommended Contraindication 



 

                          2010 8:48 AM         Depression Done 

 

                          2010 12:00 AM         Foot Exam-Diabetic Done 

 

                          2010 12:00 AM         Cholest Cry Stone Ql .0 %LDLc SerPl-mCnc 126.0 mg/dLGlucose

 SerPl-mCnc 102.0 mg/dL

 

                          2010 8:45 AM          TRIGLYCERIDES 122.0 mg/dLCHOLESTEROL 186.0 mg/dLHDL 36.0 mg/dLLDL

 (CALC) 126.0 mg/dLGLUCOSE 102.0 mg/dLSODIUM 143.0 mmol/LPOTASSIUM 3.70 
mmol/LCHLORIDE 111.0 mmol/LCO2 23.0 mmol/LBUN 10.0 mg/dLCREATININE 0.80 
mg/dLSGOT/AST 12.0 IU/LSGPT/ALT 11.0 IU/LALK PHOS 65.0 IU/LTOTAL PROTEIN 7.20 
g/dLALBUMIN 3.90 g/dLTOTAL BILI 0.50 mg/dLCALCIUM 10.20 mg/dLeGFR >60 
mL/min/1.73 m2WBC 5.7 RBC 3.26 HGB 10.60 g/dLHCT 31.70 %MCV 97.0 fLMCH 32.50 
pgMCHC 33.40 g/dLRDW CV 13.30 %MPV 9.70 fLPLT 287 %NEUT 62.90 %%LYMP 21.80 
%%MONO 9.90 %%EOS 5.0 %%BASO 0.40 %#NEUT 3.56 #LYMP 1.23 #MONO 0.56 #EOS 0.28 
#BASO 0.02 

 

                          2010 12:00 AM        Glucose SerPl-mCnc 96.0 mg/dLCholest Cry Stone Ql .0 %LDLc

 SerPl-mCnc 146.0 mg/dL

 

                          2010 8:26 AM         TRIGLYCERIDES 106.0 mg/dLCHOLESTEROL 199.0 mg/dLHDL 32.0 mg/dLLDL

 (CALC) 146.0 mg/dLGLUCOSE 96.0 mg/dLSODIUM 143.0 mmol/LPOTASSIUM 4.0 
mmol/LCHLORIDE 113.0 mmol/LCO2 24.0 mmol/LBUN 13.0 mg/dLCREATININE 1.0 
mg/dLSGOT/AST 11.0 IU/LSGPT/ALT 6.0 IU/LALK PHOS 56.0 IU/LTOTAL PROTEIN 6.60 
g/dLALBUMIN 3.80 g/dLTOTAL BILI 0.50 mg/dLCALCIUM 9.30 mg/dLeGFR 57 

 

                          10/6/2010 12:00 AM        Cholest Cry Stone Ql .0 %LDLc SerPl-mCnc 111.0 mg/dLGlucose

 SerPl-mCnc 81.0 mg/dL

 

                          10/6/2010 2:45 PM         TRIGLYCERIDES 123.0 mg/dLCHOLESTEROL 178.0 mg/dLHDL 42.0 mg/dLLDL

 (CALC) 111.0 mg/dLGLUCOSE 81.0 mg/dLSODIUM 139.0 mmol/LPOTASSIUM 4.10 
mmol/LCHLORIDE 106.0 mmol/LCO2 24.0 mmol/LBUN 13.0 mg/dLCREATININE 0.90 
mg/dLSGOT/AST 13.0 IU/LSGPT/ALT 11.0 IU/LALK PHOS 61.0 IU/LTOTAL PROTEIN 7.10 
g/dLALBUMIN 3.90 g/dLTOTAL BILI 0.30 mg/dLCALCIUM 9.30 mg/dLeGFR >60 mL/min/1.73
 m2WBC 6.9 RBC 3.59 HGB 11.50 g/dLHCT 35.30 %MCV 98.0 fLMCH 32.0 pgMCHC 32.60 
g/dLRDW CV 12.90 %MPV 9.90 fLPLT 311 %NEUT 64.90 %%LYMP 22.50 %%MONO 7.20 %%EOS 
5.10 %%BASO 0.30 %#NEUT 4.45 #LYMP 1.54 #MONO 0.49 #EOS 0.35 #BASO 0.02 

 

                          2011 12:00 AM         Mammogram -Women over 40 Ordered 

 

                          2011 10:25 AM        TRIGLYCERIDES 111.0 mg/dLCHOLESTEROL 195.0 mg/dLHDL 43.0 mg/dLLDL

 (CALC) 130.0 mg/dLWBC 5.3 RBC 3.76 HGB 12.0 g/dLHCT 37.80 %.0 fLMCH 
31.90 pgMCHC 31.70 g/dLRDW CV 13.0 %MPV 9.70 fLPLT 259 %NEUT 69.0 %%LYMP 17.60 
%%MONO 8.30 %%EOS 4.70 %%BASO 0.40 %#NEUT 3.63 #LYMP 0.93 #MONO 0.44 #EOS 0.25 
#BASO 0.02 GLUCOSE 102.0 mg/dLSODIUM 146.0 mmol/LPOTASSIUM 4.20 mmol/LCHLORIDE 
113.0 mmol/LCO2 23.0 mmol/LBUN 15.0 mg/dLCREATININE 1.0 mg/dLSGOT/AST 12.0 
IU/LSGPT/ALT 17.0 IU/LALK PHOS 60.0 IU/LTOTAL PROTEIN 6.90 g/dLALBUMIN 4.20 
g/dLTOTAL BILI 0.40 mg/dLCALCIUM 9.70 mg/dLeGFR 57 

 

                          2011 11:49 AM        Cholest Cry Stone Ql .0 %LDLc SerPl-mCnc 130.0 mg/dLHDLc

 SerPl-mCnc 43.0 mg/dLTrigl SerPl-mCnc 111.0 mg/dLGlucose SerPl-mCnc 102.0 mg/dL

 

                          2011 11:52 AM        Pap Smear Declined 

 

                          2011 11:28 AM        Lithium 2.080 mmol/LGLUCOSE 102.0 mg/dLSODIUM 135.0 mmol/LPOTASSIUM

 3.90 mmol/LCHLORIDE 106.0 mmol/LCO2 21.0 mmol/LBUN 12.0 mg/dLCREATININE 1.30 
mg/dLCALCIUM 10.70 mg/dLeGFR 42 

 

                          2011 8:58 AM          Lithium 0.690 mmol/L

 

                          2011 2:38 PM         VITAMIN B12 3483.0 pg/mL

 

                          2013 3:35 PM          WBC 5.1 RBC 3.73 HGB 11.70 g/dLHCT 36.40 %MCV 98.0 fLMCH 31.40

 pgMCHC 32.10 g/dLRDW CV 13.10 %MPV 9.80 fLPLT 224 %NEUT 66.80 %%LYMP 19.10 
%%MONO 9.0 %%EOS 4.90 %%BASO 0.20 %#NEUT 3.42 #LYMP 0.98 #MONO 0.46 #EOS 0.25 
#BASO 0.01 GLUCOSE 88.0 mg/dLSODIUM 141.0 mmol/LPOTASSIUM 4.10 mmol/LCHLORIDE 
110.0 mmol/LCO2 22.0 mmol/LBUN 22.0 mg/dLCREATININE 1.10 mg/dLCALCIUM 9.80 
mg/dLeGFR 50 Lithium 0.760 mmol/L

 

                          2013 11:02 AM        TRIGLYCERIDES 106.0 mg/dLCHOLESTEROL 181.0 mg/dLHDL 46.0 mg/dLLDL

 (CALC) 114.0 mg/dLVITAMIN D 41.10 ng/mL

 

                          10/17/2014 10:10 AM       WBC 5.0 RBC 3.66 HGB 11.60 g/dLHCT 36.80 %.0 fLMCH 31.70

 pgMCHC 31.50 g/dLRDW CV 13.50 %MPV 10.10 fLPLT 209 %NEUT 69.20 %%LYMP 21.0 
%%MONO 6.40 %%EOS 3.20 %%BASO 0.20 %#NEUT 3.46 #LYMP 1.05 #MONO 0.32 #EOS 0.16 
#BASO 0.01 GLUCOSE 100.0 mg/dLSODIUM 148.0 mmol/LPOTASSIUM 3.90 mmol/LCHLORIDE 
114.0 mmol/LCO2 26.0 mmol/LBUN 12.0 mg/dLCREATININE 1.20 mg/dLSGOT/AST 9.0 
IU/LSGPT/ALT <6 IU/LALK PHOS 82.0 IU/LTOTAL PROTEIN 6.90 g/dLALBUMIN 4.0 
g/dLTOTAL BILI 0.40 mg/dLCALCIUM 10.50 mg/dLeGFR 45 TRIGLYCERIDES 96.0 
mg/dLCHOLESTEROL 155.0 mg/dLHDL 38.0 mg/dLLDL (CALC) 98.0 mg/dLLithium 0.850 
mmol/LCancer Antigen (CA) 125 8.30 U/mL

 

                          2015 10:25 AM        Lithium 0.790 mmol/LWBC 4.8 RBC 3.44 HGB 11.0 g/dLHCT 35.20 

%.0 fLMCH 32.0 pgMCHC 31.30 g/dLRDW CV 14.0 %MPV 9.30 fLPLT 210 %NEUT 
70.80 %%LYMP 17.20 %%MONO 8.10 %%EOS 3.50 %%BASO 0.40 %#NEUT 3.41 #LYMP 0.83 
#MONO 0.39 #EOS 0.17 #BASO 0.02 TRIGLYCERIDES 107.0 mg/dLCHOLESTEROL 174.0 
mg/dLHDL 43.0 mg/dLLDL (CALC) 110.0 mg/dLGLUCOSE 90.0 mg/dLSODIUM 145.0 
mmol/LPOTASSIUM 3.80 mmol/LCHLORIDE 115.0 mmol/LCO2 24.0 mmol/LBUN 17.0 mg
/dLCREATININE 1.30 mg/dLSGOT/AST 18.0 IU/LSGPT/ALT 17.0 IU/LALK PHOS 56.0 
IU/LTOTAL PROTEIN 6.70 g/dLALBUMIN 3.90 g/dLTOTAL BILI 0.40 mg/dLCALCIUM 9.80 
mg/dLeGFR 41 

 

                          2015 8:50 AM        WBC 5.8 RBC 3.29 HGB 10.70 g/dLHCT 34.0 %.0 fLMCH 32.50

 pgMCHC 31.50 g/dLRDW CV 13.60 %MPV 9.60 fLPLT 223 %NEUT 69.60 %%LYMP 18.90 
%%MONO 8.50 %%EOS 2.80 %%BASO 0.20 %#NEUT 4.03 #LYMP 1.09 #MONO 0.49 #EOS 0.16 
#BASO 0.01 Lithium 0.620 mmol/LGLUCOSE 83.0 mg/dLSODIUM 139.0 mmol/LPOTASSIUM 
3.90 mmol/LCHLORIDE 109.0 mmol/LCO2 22.0 mmol/LBUN 19.0 mg/dLCREATININE 1.40 
mg/dLSGOT/AST 19.0 IU/LSGPT/ALT 21.0 IU/LALK PHOS 55.0 IU/LTOTAL PROTEIN 6.50 
g/dLALBUMIN 3.90 g/dLTOTAL BILI 0.50 mg/dLCALCIUM 9.60 mg/dLeGFR 38 
TRIGLYCERIDES 121.0 mg/dLCHOLESTEROL 192.0 mg/dLHDL 51.0 mg/dLLDL 121.0 mg/dLTSH
 1.210 uIU/mLHemoglobin A1c 5.40 %

 

                          3/15/2016 8:08 AM         VITAMIN B12 696.0 pg/mL

 

                          3/23/2016 8:26 AM         WBC 7.0 RBC 3.61 HGB 11.80 g/dLHCT 37.70 %.0 fLMCH 32.70

 pgMCHC 31.30 g/dLRDW CV 12.50 %MPV 10.0 fLPLT 207 %NEUT 73.60 %%LYMP 16.40 
%%MONO 6.60 %%EOS 3.0 %%BASO 0.30 %#NEUT 5.15 #LYMP 1.15 #MONO 0.46 #EOS 0.21 
#BASO 0.02 Lithium 0.940 mmol/LGLUCOSE 108.0 mg/dLSODIUM 143.0 mmol/LPOTASSIUM 
4.30 mmol/LCHLORIDE 110.0 mmol/LCO2 27.0 mmol/LBUN 16.0 mg/dLCREATININE 1.60 
mg/dLSGOT/AST 13.0 IU/LSGPT/ALT 7.0 IU/LALK PHOS 71.0 IU/LTOTAL PROTEIN 6.80 
g/dLALBUMIN 4.0 g/dLTOTAL BILI 0.20 mg/dLCALCIUM 10.40 mg/dLeGFR 32 
TRIGLYCERIDES 113.0 mg/dLCHOLESTEROL 169.0 mg/dLHDL 42.0 mg/dLLDL (CALC) 104.0 
mg/dLTSH 2.20 uIU/mLHemoglobin A1c 5.20 %

 

                          3/25/2016 9:17 AM         COLOR YELLOW APPEARANCE CLEAR SPEC GRAV 1.010 pH 7.0 PROTEIN 

NEGATIVE GLUCOSE NEGATIVE mg/dLKETONE NEGATIVE BILIRUBIN NEGATIVE BLOOD NEGATIVE
 NITRITE NEGATIVE LEUK SCREEN SMALL CASTS/LPF NEGATIVE /LPFCRYSTALS NEGATIVE 
MUCOUS THRDS NEGATIVE BACTERIA NEGATIVE EPITH CELLS FEW SQUAMOUS /HPFTRICHOMONAS
 NEGATIVE YEAST NEGATIVE 







History Of Immunizations







       Name    Date Admin    Mfg Name    Mfg Code    Trade Name    Lot#    Route    Inj    Vis Given    Vis

 Pub                                    CVX

 

        Influenza    2008    Not Entered    NE      Not Entered            Not Entered    Not Entered

                    1            999

 

        X       12/19/2008    [employer]    MSD     Pneumovax 23            Intramuscular    Not Entered

                    1            999

 

           Influenza    10/15/2010    [employer]    NOV        Fluvirin > 12 Years    

[id number]     Intramuscular    Left Deltoid    10/15/2010    2009    999

 

          X         3/23/2015    [employer]    [facility]       Prevnar 13    [id number]    Intramuscular

                Right Gluteous Medius    3/23/2015       2013       109







History of Past Illness







                    Name                Date of Onset       Comments

 

                    Peritoneal Neoplasm, Malignant                         

 

                    Hyperlipidemia                           

 

                    Bipolar disorder, unspecified                         

 

                    Artificial opening status; colostomy                         

 

                    B12 deficiency                           

 

                    Anemia, Pernicious                         

 

                    Arthritis unspecified                         

 

                    cervical cancer                          

 

                    Artificial opening status; colostomy    2010  1:10PM     

 

                    Bipolar disorder, unspecified    2010  1:10PM     

 

                    Hyperlipidemia      2010  1:10PM     

 

                    Anemia, Pernicious    2010  1:10PM     

 

                    Postoperative Follow-Up    2010  1:55PM     

 

                    Postoperative Follow-Up    Mar  8 2010 10:57AM     

 

                    Artificial opening status; colostomy    Mar  8 2010  1:19PM     

 

                    Peritoneal Neoplasm, Malignant    Mar  8 2010  1:19PM     

 

                    Artificial opening status; colostomy    2010  1:40PM     

 

                    Hyperlipidemia      2010  1:40PM     

 

                    Anemia, Pernicious    2010  1:40PM     

 

                    Peritoneal Neoplasm, Malignant    2010  1:40PM     

 

                    Arthritis unspecified    2010  1:40PM     

 

                    Anemia of Chronic Illness    2010  1:40PM     

 

                    Tinea corporis      2010  3:17PM     

 

                    Bipolar disorder, unspecified    2010  1:33PM     

 

                    Hyperlipidemia      2010  1:33PM     

 

                    Anemia, Pernicious    2010  1:33PM     

 

                    Peritoneal Neoplasm, Malignant    2010  1:33PM     

 

                    B12 deficiency      2010  1:33PM     

 

                    Ethmoidal Sinusitis, Acute    Sep 21 2010  3:53PM     

 

                    Wheezing            Sep 21 2010  3:53PM     

 

                    Flu                 Oct 15 2010  1:40PM     

 

                    Bipolar disorder, unspecified    Oct 15 2010  1:42PM     

 

                    Hyperlipidemia      Oct 15 2010  1:42PM     

 

                    Anemia, Pernicious    Oct 15 2010  1:42PM     

 

                    Peritoneal Neoplasm, Malignant    Oct 15 2010  1:42PM     

 

                    Bipolar disorder, unspecified    2011 12:01PM     

 

                    Hyperlipidemia      2011 12:01PM     

 

                    Anemia, Pernicious    2011 12:01PM     

 

                    Peritoneal Neoplasm, Malignant    2011 12:01PM     

 

                    Bipolar disorder, unspecified    Apr 15 2011 10:55AM     

 

                    Major Depression    2011 10:11AM     

 

                    Bipolar Disorder    2011 10:11AM     

 

                    Cancer              May 10 2011  4:16PM     

 

                    Major Depression    May 10 2011  3:16PM     

 

                    Bipolar Disorder    May 10 2011  3:16PM     

 

                    Hypercalcemia       May 23 2011  2:47PM     

 

                    Bipolar disorder, unspecified    May 23 2011  2:47PM     

 

                    Colon Cancer, Personal History    May 23 2011  2:47PM     

 

                    Bipolar Disorder    May 31 2011  4:39PM     

 

                    Depressive Disorder    2011 10:01AM     

 

                    Vitamin B12 deficiency    2011 10:01AM     

 

                    Vitamin D Deficiency    2011  5:07PM     

 

                    Anemia, Vitamin B12 Deficiency    2011  5:07PM     

 

                    B12 deficiency      2011  3:56PM     

 

                    Routine gynecological examination    Aug  4 2011  9:08AM     

 

                    Screening Examination for Breast Cancer    Aug  4 2011  9:08AM     

 

                    Tinea Corporis      Aug  4 2011  9:08AM     

 

                    Depressive Disorder    Sep 23 2011  8:47AM     

 

                    Contact Dermatitis    Sep 23 2011  8:47AM     

 

                    Anemia, Pernicious    Sep 23 2011  8:47AM     

 

                    B12 deficiency      Sep 23 2011  8:47AM     

 

                    B12 deficiency      Sep 27 2011  2:58PM     

 

                    B12 deficiency      Oct 20 2011  2:34PM     

 

                    Flu                 Dec  9 2011  3:16PM     

 

                    B12 deficiency      Dec  9 2011  3:17PM     

 

                    B12 deficiency      2012  4:52PM     

 

                    B12 deficiency      2012 11:10AM     

 

                    B12 deficiency      2012  3:37PM     

 

                    B12 deficiency      May  3 2012  4:10PM     

 

                    B12 deficiency      2012  2:54PM     

 

                    B12 deficiency      2012 11:23AM     

 

                    B12 deficiency      Aug  9 2012  2:08PM     

 

                    B12 deficiency      Sep  6 2012  4:36PM     

 

                    B12 deficiency      Oct 16 2012 10:23AM     

 

                    Flu                 Feb  4   3:11PM     

 

                    Bipolar disorder, unspecified    Feb  4   2:48PM     

 

                    Anemia, Pernicious    Feb  2013  2:48PM     

 

                    B12 deficiency      Feb  4   2:48PM     

 

                    Extrapyramidal abnormal movement disorder    Feb  4   2:48PM     

 

                    B12 deficiency      Apr  3 2013 12:03PM     

 

                    Bipolar disorder, unspecified    May  7 2013  1:31PM     

 

                    Anemia, Pernicious    May  7 2013  1:31PM     

 

                    B12 deficiency      May  7 2013  1:31PM     

 

                    Extrapyramidal abnormal movement disorder    May  7 2013  1:31PM     

 

                    B12 deficiency      2013  3:42PM     

 

                    B12 deficiency      2013  1:31PM     

 

                    Hyperlipidemia      Aug  7 2013 10:37AM     

 

                    Vitamin D Deficiency    Aug  7 2013 10:37AM     

 

                    Bipolar disorder, unspecified    Aug  7 2013 10:37AM     

 

                    Anemia, Pernicious    Aug  7 2013 10:37AM     

 

                    B12 deficiency      Aug  7 2013 10:37AM     

 

                    B12 deficiency      Sep 25 2013 11:15AM     

 

                    B12 deficiency      Dec 11 2013  3:16PM     

 

                    B12 deficiency      Mar  6 2014  1:48PM     

 

                    B12 deficiency      May 21 2014  3:17PM     

 

                    Screening Examination for Breast Cancer    2014  3:23PM     

 

                    Periumbilical abdominal pain    2014  3:23PM     

 

                    B12 deficiency      Jul 10 2014  2:52PM     

 

                    Anemia, Vitamin B12 Deficiency    Aug 13 2014  4:50PM     

 

                    Bipolar disorder    Oct 16 2014 11:13AM     

 

                    Hyperlipidemia      Oct 16 2014 11:13AM     

 

                    Anemia, Pernicious    Oct 16 2014 11:13AM     

 

                    Peritoneal Neoplasm, Malignant    Oct 16 2014 11:13AM     

 

                    Screening breast examination    Oct 16 2014 11:13AM     

 

                    Weight loss         Oct 16 2014 11:13AM     

 

                    Anemia, Pernicious    Mar 23 2015  2:57PM     

 

                    B12 deficiency      Mar 23 2015  2:57PM     

 

                    Need for Prevnar vaccine    Mar 23 2015  2:57PM     

 

                    Bipolar disorder    Mar 23 2015  2:57PM     

 

                    Hyperlipidemia      Mar 23 2015  2:57PM     

 

                    Anemia, Pernicious    Mar 23 2015  2:57PM     

 

                    Peritoneal Neoplasm, Malignant    Mar 23 2015  2:57PM     

 

                    B12 deficiency      May  4 2015  4:48PM     

 

                    Hyperlipidemia      May 13 2015  9:56AM     

 

                    Anemia              May 13 2015  9:56AM     

 

                    Bipolar disorder    May 13 2015  9:56AM     

 

                    Bipolar disorder    May 14 2015  3:27PM     

 

                    Hyperlipidemia      May 14 2015  3:27PM     

 

                    Anemia, Pernicious    May 14 2015  3:27PM     

 

                    Peritoneal Neoplasm, Malignant    May 14 2015  3:27PM     

 

                    B12 deficiency      2015  2:20PM     

 

                    B12 deficiency      2015 11:34AM     

 

                    B12 deficiency      Aug 18 2015  9:06AM     

 

                    Tinea Corporis      Sep 18 2015  8:54AM     

 

                    B12 deficiency      Sep 18 2015  8:54AM     

 

                    B12 deficiency      2015 10:28AM     

 

                    Herpes zoster without complication    Dec  3 2015  9:52AM     

 

                    B12 deficiency      Dec 23 2015 11:21AM     

 

                    B12 deficiency      2016  4:51PM     

 

                    Vitamin B 12 deficiency    Mar 14 2016  5:35PM     

 

                    B12 deficiency      Mar 15 2016 12:14PM     

 

                    B12 deficiency      May  5 2016 11:30AM     

 

                    Edema               May  5 2016 11:30AM     

 

                    Dermatitis          May  5 2016 11:30AM     

 

                    Edema               May 17 2016  8:38AM     

 

                    Shortness of breath    May 17 2016  8:38AM     

 

                    Bilateral edema of lower extremity    2016  2:06PM     

 

                    B12 deficiency      2016  2:06PM     

 

                    B12 deficiency      2016 11:50AM     

 

                    B12 deficiency      2016 11:20AM     







Payers







           Insurance Name    Company Name    Plan Name    Plan Number    Policy Number    Policy Group

 Number                                 Start Date

 

                    Medicare Part A    Medicare RHC              [id number]              N/A

 

                          [employer]    [employer]                 [id number]

                                                    

 

                    Medicare Part A    Medicare - Lab/Xray              [id number]              2006

 

                    Medicare Part B    Medic[employer] [place]              [id number]              2006

 

                          [employer]    [employer]                 50 percent

                                                    2009

 

                    Human    [employer] [facility]              [id number]              N/A

 

                    Medicare Part A    Medicare Part A              [id number]              N/A

 

                    Medicare Part A    Medicare Part A              [id number]              2006









History of Encounters







                    Visit Date          Visit Type          Provider

 

                    2016           Nurse visit         Bhupinder Louise DO

 

                    2016           Office visit        Bret GREEN

 

                    2016            Office visit        Bhupinder Louise DO

 

                    3/15/2016           Nurse visit         Bhupinder Louise DO

 

                    2016            Nurse visit         Bhupinder Louise DO

 

                    2015          Nurse visit         Bhupinder Louise DO

 

                    12/3/2015           Office visit        Bhupinder Louise DO

 

                    2015          Nurse visit         Bhupinder Louise DO

 

                    2015           Office visit        Bhupinder Louise DO

 

                    2015           Nurse visit         Bhupinder Louise DO

 

                    2015            Nurse visit         Bhupinder Louise DO

 

                    2015            Nurse visit         Bhupinder Louise DO

 

                    2015           Office visit        Bhupinder Aspen DO

 

                    2015            Nurse visit         Bhupinder Aspen DO

 

                    3/23/2015           Office visit        Bhupinder Aspen DO

 

                    10/16/2014          Office visit        Bhupinder Aspen DO

 

                    2014           Nurse visit         Radha Weston APRN

 

                    7/10/2014           Nurse visit         Bhupinder Aspen DO

 

                    2014           Office visit        Bhupinder Aspen DO

 

                    2014           Nurse visit         Bhupinder Aspen DO

 

                    3/6/2014            Nurse visit         Bhupinder Aspen DO

 

                    2014            [facility]            EARNEST Lopez MD

 

                    2013          Nurse visit         Bhupinder Aspen DO

 

                    2013           Nurse visit         Bhupinder Aspen DO

 

                    2013            Office visit        Bhupinder Aspen DO

 

                    2013            Nurse visit         Bhupinder Aspen DO

 

                    2013            Nurse visit         Bhupinder Aspen DO

 

                    2013            Office visit        Bhupinder Aspen DO

 

                    4/3/2013            Nurse visit         Bhupinder Aspen DO

 

                    2013            Office visit        Bhupinder Aspen DO

 

                    10/16/2012          Nurse visit         Bhupinder Aspen DO

 

                    2012            Nurse visit         Bhupinder Aspen DO

 

                    2012            Voided              Bhupinder Aspen DO

 

                    2012            Nurse visit         Bhupinder Aspen DO

 

                    2012            Nurse visit         Bhupinder Aspen DO

 

                    2012           Nurse visit         Bhupinder Aspen DO

 

                    5/3/2012            Nurse visit         Bhupinder Aspen DO

 

                    2012           Nurse visit         Bhupinder Aspen DO

 

                    2012           Nurse visit         Bhupinder Aspen DO

 

                    2012           Nurse visit         Bhupinder Aspen DO

 

                    2011           Nurse visit         Bhupinder Aspen DO

 

                    10/20/2011          Nurse visit         Bhupinder Aspen DO

 

                    2011           Office visit        Bhupinder Aspen DO

 

                    2011           Nurse visit         Radha Ontiveros APRN

 

                    2011            Office visit        Bhupinder Aspen DO

 

                    2011           Nurse visit         Bhupinder Aspen DO

 

                    2011            Office visit        Bhupinder Aspen DO

 

                    2011           Office visit        Bhupinder Aspen DO

 

                    5/10/2011           Office visit        Bhupinder Aspen DO

 

                    2011           Office visit        Bhupinder Aspen DO

 

                    4/15/2011           Office visit        Devin Angel DO

 

                    2011           Office visit        Devin Angel DO

 

                    10/15/2010          Office visit        Devin Angel DO

 

                    2010           Office visit        Devin Angel DO

 

                    2010            Office visit        Devin Angel DO

 

                    2010           Office visit        Devin Angel DO

 

                    2010            Office visit        Dvein Angel DO

 

                    3/8/2010            Office visit        Devin Masterson MD

 

                    2010            Surgery             Devin Masterson MD

 

                    2010            Office visit        Devin Angel DO

 

                    2010           Surgery             Devin Masterson MD

 

                    2010           Hospital            Devin Masterson MD

 

                    2010           [facility]            Devin Masterson MD

 

                    10/22/2009          Office visit        Devin Angel DO

## 2019-06-26 NOTE — XMS REPORT
MU2 Ambulatory Summary

                             Created on: 2017



Pauline Gan

External Reference #: [id number]

: 1950

Sex: Female



Demographics







                          Address                   [address]

[place]  [id number]

 

                          Home Phone                [phone]

 

                          Preferred Language        English

 

                          Marital Status            Legally 

 

                          Cheondoism Affiliation     Unknown

 

                          Race                      White

 

                          Ethnic Group              Not  or 





Author







                          Author                    Bhupinder Louise

 

                          [facility]

 

                          Address                   [address]

[place]  [id number]



 

                          Phone                     [phone]







Care Team Providers







                    Care Team Member Name    Role                Phone

 

                    Bhupinder Louise    PCP                 Unavailable

 

                    Bhupinder Louise    PreferredProvider    Unavailable







Allergies and Adverse Reactions







                    Name                Reaction            Notes

 

                    NO KNOWN DRUG ALLERGIES                         







Plan of Treatment







             Planned Activity    Comments     Planned Date    Planned Time    Plan/Goal

 

             Injection,Subcutaneous/Intramuscul, [facility]                 2017    12:00 AM      

 

             [facility]                       2017    12:00 AM      







Medications







                                        Active 

 

             Name         Start Date    Estimated Completion Date    SIG          Comments

 

                Latuda 20 mg oral tablet                                    take 1 tablet (20 mg) by oral route once daily with

 food (at least 350 calories)            

 

             pravastatin 40 mg oral tablet    3/30/2015                 TAKE 1 TABLET BY MOUTH DAILY     

 

                Namenda XR 28 mg oral capsule,sprinkle,ER 24hr    2015                       take 1 capsule (28

 mg) by oral route once daily            

 

                Namenda XR 28 mg oral capsule,sprinkle,ER 24hr    2016                       take 1 capsule (28

 mg) by oral route once daily            

 

                potassium chloride 10 mEq oral tablet extended release    2016                       take 1 tablet

 (10 meq) by oral route once daily       

 

             pravastatin 40 mg oral tablet    2016                 TAKE 1 TABLET BY MOUTH DAILY     

 

                Vitamin B-12 1,000 mcg/mL injection solution    2016                       inject 1 milliliter 

(1,000 mcg) by intramuscular route once a month     

 

                potassium chloride 10 mEq oral tablet extended release    2016                      take 1 tablet

 (10 meq) by oral route once daily       

 

                Namenda XR 28 mg oral capsule,sprinkle,ER 24hr    2016                      TAKE 1 CAPSULE BY

 MOUTH EVERY DAY                         

 

                furosemide 40 mg oral tablet    2016                      take 1 tablet (40 mg) by oral route

 once daily                              

 

                mirtazapine 45 mg oral tablet                                    take 1 tablet (45 mg) by oral route once daily

 before bedtime                          

 

             Fish Oil 300-1,000 mg oral capsule                              take 1 capsule by oral route daily     

 

             Vitamin D3 1,000 unit oral tablet                              take 1 tablet by oral route daily     

 

                Calcium 600 600 mg calcium (1,500 mg) oral tablet                                    take 1 tablet by oral route

 daily                                   

 

                Aspirin Low Dose 81 mg oral tablet,delayed release (DR/EC)                                    take 1 tablet 

(81 mg) by oral route once daily         

 

                metoclopramide HCl 10 mg oral tablet    2017                       take 1 tablet by oral route 

2 times a day for 50 days                

 

             furosemide 40 mg oral tablet    2017                 TAKE 1 TABLET BY MOUTH DAILY     

 

                Namenda XR 28 mg oral capsule,sprinkle,ER 24hr    2017                       TAKE 1 CAPSULE BY 

MOUTH EVERY DAY                          

 

                Linzess 72 mcg oral capsule    2017                       take 1 capsule (72 mcg) by oral route

 once daily on an empty stomach at least 30 minutes before 1st meal of the day     



 

             pravastatin 40 mg oral tablet    2017                 TAKE 1 TABLET BY MOUTH DAILY     

 

                potassium chloride 10 mEq oral tablet extended release    2017                       TAKE 2 TABLETs

 BY MOUTH twice DAILY for one week       









                                         

 

             Name         Start Date    Expiration Date    SIG          Comments

 

             Reglan 10mg    3/29/2010    2010    one ac and hs     

 

                Keflex 500 mg oral capsule    2010       10/1/2010       take 1 capsule (500 mg) by oral

 route every 6 hours for 10 days         

 

                Bactrim -160 mg oral tablet    2011       take 1 tablet by oral route

 every 12 hours for 7 days               

 

                triamcinolone acetonide 0.1 % topical cream    2011      apply a thin

 layer to the affected area(s) by topical route 2 times per day     

 

                sertraline 100 mg oral tablet    4/10/2012       5/10/2012       take 1.5 tablets by oral route

 daily for 30 days                       

 

                ergocalciferol (vitamin D2) 50,000 unit oral capsule    4/15/2013       2013       TAKE

 ONE CAPSULE BY MOUTH ONCE A WEEK        

 

                CYANOCOBALAM 1000MCGINJ 1000 milliliter    2013       INJECT 1ML INTRAMUSCULAR

 ONCE A MONTH                            

 

                pravastatin 40 mg oral tablet    3/25/2014       3/20/2015       TAKE ONE TABLET BY MOUTH EVERY

 DAY                                     

 

                          Zostavax (PF) 19,400 unit/0.65 mL subcutaneous suspension for reconstitution    3/23/2015

                    3/24/2015           inject 0.65 milliliter by subcutaneous route once     

 

                famciclovir 500 mg oral tablet    12/3/2015       12/10/2015      take 1 tablet (500 mg) by

 oral route every 8 hours for 7 days     

 

                furosemide 40 mg oral tablet    2016      take 1 tablet (40 mg) by oral

 route once daily                        

 

                Cipro 500 mg oral tablet    2016       take 1 tablet (500 mg) by oral route

 2 times per day for 5 days              

 

                Bactrim -160 mg oral tablet    2016        take 1 tablet by oral route

 every 12 hours for 7 days               

 

                metoclopramide HCl 10 mg oral tablet    2017       take 1 tablet by oral

 route 2 times a day for 50 days         

 

                Macrobid 100 mg oral capsule    2017       take 1 capsule (100 mg) by oral

 route 2 times per day with food for 7 days     

 

                Augmentin 875-125 mg oral tablet    2017       take 1 tablet by oral route

 every 12 hours for 7 days               

 

                amoxicillin 500 mg oral tablet    2017       take 1 tablet (500 mg) by oral

 route every 12 hours for 7 days         

 

                cefuroxime axetil 500 mg oral tablet    2017       take 1 tablet (500 mg)

 by oral route 2 times per day for 7 days     

 

                ciprofloxacin HCl 500 mg oral tablet    2017       take 1 tablet (500 mg)

 by oral route every 12 hours for 5 days     









                                        Discontinued 

 

             Name         Start Date    Discontinued Date    SIG          Comments

 

                Tylenol 325 mg oral tablet                    2013        take 1 - 2 tablets (325 -650 mg) by oral

 route every 4-6 hours as needed         

 

                Calcium 600 + D(3) 600 mg(1,500mg) -400 unit oral tablet                    2011       take 1 tablet

 by oral route 2 times a day            no longer taking

 

                Vitamin B-12 1,000 mcg oral tablet extended release    2010       take 1

 tablet by oral route daily             no longer taking

 

                Antifungal (clotrimazole) 1 % topical cream    2010       apply to the 

affected and surrounding areas of skin by topical route 2 times per day morning 
and evening                              

 

                sertraline 100 mg oral tablet    5/10/2011       2011       take 2 tablets (200 mg) by 

oral route once daily                   discontinued by Dr. Serrano

 

                mirtazapine 15 mg oral tablet                    2011        take 1 tablet (15 mg) by oral route 

once daily before bedtime               Dr. Serrano

 

                mirtazapine 15 mg oral tablet                    2011        take 1 tablet (15 mg) by oral route 

once daily before bedtime               dc'd by Dr. Serrano

 

                Pristiq 50 mg oral tablet extended release 24 hr                    2013        take 1 tablet (50

 mg) by oral route once daily           Dr. Serrano

 

                Pristiq 50 mg oral tablet extended release 24 hr                    2013        take 1 tablet (50

 mg) by oral route once daily           dose updated

 

                Vitamin B-12 1,000 mcg/mL injection solution    2011        inject 1 milliliter

 (1,000 mcg) by intramuscular route once a month    on list already

 

                    syringe with needle 1 mL 25 gauge x 1" miscellaneous syringe    2011

                          use for injection once a month     

 

                clotrimazole 1 % topical cream    2011        apply to the affected and surrounding

 areas of skin by topical route 2 times per day in the morning and evening     

 

                Vitamin D2 50,000 unit oral capsule    2011        take 1 capsule (50,000

 unit) by oral route once weekly        generic on list

 

                Pravachol 40 mg oral tablet    2012        take 1 tablet (40 mg) by oral 

route once daily for 90 days            generic on list

 

                lithium carbonate 300 mg oral capsule    2012        take 1 capsule by oral

 route daily                            dose updated

 

                Pristiq 100 mg oral tablet extended release 24 hr                    4/10/2012       take 1 and 1/2 

tablet (150 mg) by oral route once daily    Mental Health provider

 

                Pristiq 100 mg oral tablet extended release 24 hr                    4/10/2012       take 1 and 1/2 

tablet (150 mg) by oral route once daily    Discontinued by Dr Efrain Knight at [facility]

 

                hydroxyzine HCl 50 mg oral tablet    10/16/2014      2015       take 1 tablet (50 mg) 

by oral route at bedtime                 

 

                lithium carbonate 300 mg oral capsule    2015       take 1 capsule (300

 mg) by oral route 2 for 30 days         

 

                fluconazole 100 mg oral tablet    2015       12/3/2015       take 1 tablet (100 mg) by 

oral route once a week                   

 

                ketoconazole 2 % topical cream    2015       12/3/2015       apply to the affected area(s)

 by topical route 2 times per day        

 

                prednisone 10 mg oral tablet    12/3/2015       2016        take 2 tablets (20 mg) by oral

 route once daily for 4 days 1 tablet daily for 4 days 0.5 tablet daily for 4 
days                                     

 

                triamcinolone acetonide 0.1 % topical cream    2016       apply a thin layer

 to the affected area(s) by topical route 2 times per day     

 

                Cipro 500 mg oral tablet    1/15/2017       2017       take 1 tablet (500 mg) by oral route

 every 12 hours for 10 days              







Problem List







                    Description         Status              Onset

 

                    Artificial opening status; colostomy    Active               

 

                    Bipolar disorder, unspecified    Active               

 

                    Hyperlipidemia      Active               

 

                    Peritoneal Neoplasm, Malignant    Active               

 

                    Anemia, Pernicious    Active               

 

                    Arthritis unspecified    Active               

 

                    B12 deficiency      Active               







Vital Signs







      Date    Time    BP-Sys(mm[Hg]    BP-Lynn(mm[Hg])    HR(bpm)    RR(rpm)    Temp    WT    HT    HC    BMI

                    BSA                 BMI Percentile      O2 Sat(%)

 

        2017    1:34:00 PM    118 mmHg    62 mmHg    122 bpm    18 rpm    97.8 F    161.375 lbs    

69 in                     23.83 kg/m2    1.89 m2                   96 %

 

        2017    3:05:00 PM    134 mmHg    70 mmHg    70 bpm    20 rpm    97.4 F    181 lbs    69 in

                          26.7288 kg/m    1.9992 m                 98 %

 

        2017    11:07:00 AM    124 mmHg    64 mmHg    62 bpm    17 rpm    98.2 F    181.2 lbs    69

 in                       26.76 kg/m2    2.00 m2                   98 %

 

        1/15/2017    3:34:00 PM    148 mmHg    89 mmHg    69 bpm    20 rpm    98.2 F    179 lbs    69 in

                          26.4334 kg/m    1.9882 m                 98 %

 

       2017    1:51:00 PM    160 mmHg    90 mmHg    100 bpm    20 rpm    96.5 F    179 lbs             

                                                                98 %

 

       2016    3:11:00 PM    134 mmHg    76 mmHg    80 bpm    20 rpm    98 F    163 lbs    69 in     

                24.0706 kg/m    1.8972 m                      98 %

 

        2016    2:04:00 PM    142 mmHg    86 mmHg    68 bpm    16 rpm    98.5 F    166 lbs    63 in

                          29.41 kg/m2    1.83 m2                   100 %

 

        2016    11:27:00 AM    148 mmHg    78 mmHg    90 bpm    20 rpm    98.2 F    153 lbs    69 in

                          22.5939 kg/m    1.8381 m                 96 %

 

        12/3/2015    9:50:00 AM    132 mmHg    70 mmHg    62 bpm    16 rpm    97.9 F    145 lbs    69 in

                          21.41 kg/m2    1.79 m2                   100 %

 

        2015    8:52:00 AM    132 mmHg    68 mmHg    52 bpm    20 rpm    97.8 F    141 lbs    69 in

                          20.8218 kg/m    1.7645 m                 100 %

 

        2015    3:25:00 PM    120 mmHg    62 mmHg    72 bpm    16 rpm    98.1 F    136 lbs    69 in

                          20.08 kg/m2    1.73 m2                   98 %

 

       3/23/2015    2:55:00 PM    130 mmHg    76 mmHg    68 bpm    18 rpm    97 F    140 lbs    69 in    

                20.6742 kg/m    1.7583 m                      98 %

 

        10/16/2014    11:11:00 AM    120 mmHg    66 mmHg    77 bpm    20 rpm    98 F    130 lbs    69 in

                          19.20 kg/m2    1.69 m2                   100 %

 

        2014    3:21:00 PM    130 mmHg    66 mmHg    63 bpm    18 rpm    97.2 F    160 lbs    69 in

                          23.6276 kg/m    1.8797 m                 99 %

 

        2013    10:35:00 AM    132 mmHg    70 mmHg    66 bpm    20 rpm    98.1 F    157 lbs    69 in

                          23.18 kg/m2    1.86 m2                    

 

        2013    1:29:00 PM    132 mmHg    70 mmHg    76 bpm    18 rpm    98.2 F    166 lbs    69 in 

                          24.5137 kg/m    1.9146 m                  

 

       2013    2:46:00 PM    128 mmHg    70 mmHg    76 bpm    16 rpm    98 F    160 lbs    69 in     

                23.63 kg/m2     1.88 m2                          

 

        2011    8:49:00 AM    128 mmHg    78 mmHg    70 bpm    18 rpm    97.9 F    164 lbs    69 in

                          24.2183 kg/m    1.903 m                  

 

     2011    1:31:00 PM    132 mmHg    68 mmHg    84 bpm         97 F    167 lbs                        

                                         

 

        2011    9:09:00 AM    128 mmHg    70 mmHg    72 bpm    18 rpm    98.2 F    163 lbs    64 in 

                          27.9786 kg/m    1.8272 m                  

 

       2011    10:01:00 AM    132 mmHg    70 mmHg    72 bpm    18 rpm    98.2 F    154 lbs             

                                                                 

 

       2011    2:47:00 PM    128 mmHg    70 mmHg    72 bpm    18 rpm    97.8 F    156 lbs             

                                                                 

 

       5/10/2011    3:16:00 PM    144 mmHg    80 mmHg    72 bpm    18 rpm    98.2 F    158 lbs             

                                                                 

 

        2011    10:11:00 AM    132 mmHg    70 mmHg    70 bpm    18 rpm    98.2 F    168 lbs    69 in

                          24.809 kg/m    1.9261 m                  

 

        4/15/2011    10:52:00 AM    110 mmHg    60 mmHg    75 bpm    16 rpm    97.5 F    172.375 lbs    

69 in                     25.46 kg/m2    1.95 m2                   100 %

 

        2011    11:43:00 AM    120 mmHg    82 mmHg    75 bpm    16 rpm    97.2 F    178.5 lbs    69

 in                       26.3596 kg/m    1.9854 m                 100 %

 

        10/15/2010    1:32:00 PM    120 mmHg    70 mmHg    80 bpm    18 rpm    96.6 F    177 lbs    69 in

                          26.14 kg/m2    1.98 m2                   100 %

 

        2010    3:50:00 PM    168 mmHg    100 mmHg    82 bpm    18 rpm    97.8 F    177.5 lbs    69

 in                       26.2119 kg/m    1.9798 m                 97 %

 

        2010    1:21:00 PM    140 mmHg    80 mmHg    59 bpm    16 rpm    97.6 F    173.25 lbs    69 

in                        25.58 kg/m2    1.96 m2                   100 %

 

        2010    3:02:00 PM    140 mmHg    80 mmHg    61 bpm    16 rpm    97.6 F    173.125 lbs    69

 in                       25.5658 kg/m    1.9553 m                 99 %

 

        2010    1:23:00 PM    130 mmHg    80 mmHg    66 bpm    16 rpm    96.8 F    173 lbs    69 in 

                          25.55 kg/m2    1.95 m2                   100 %

 

        2010    12:58:00 PM    130 mmHg    88 mmHg    75 bpm    16 rpm    98.4 F    172.25 lbs    69

 in                       25.4366 kg/m    1.9503 m                 100 %







Social History







                    Name                Description         Comments

 

                    denies alcohol use                         

 

                    denies smoking                           

 

                    Denies illicit substance abuse                         

 

                    retired                                 direct care

 

                    Single                                   

 

                    Exercises regularly                         

 

                    Attended some college                         

 

                    Tobacco             Never smoker         







History of Procedures







                    Date Ordered        Description         Order Status

 

                    2010 12:00 AM    COMPREHEN METABOLIC PANEL    Reviewed

 

                    2010 12:00 AM    COMPLETE CBC W/AUTO DIFF WBC    Reviewed

 

                    2010 12:00 AM    LIPID PANEL         Reviewed

 

                          2015 12:00 AM        B12 Injection, Up to 1000 Mcg NDC#2521-0878-92 [facility] 

                                        Reviewed

 

                    2011 12:00 AM    MAMMOGRAM SCREENING    Reviewed

 

                    2011 12:00 AM    CYTOPATH C/V THIN LAYER    Reviewed

 

                    2011 12:00 AM    B12 Injection 1 cc NDC#[address]-22    Reviewed

 

                    2015 12:00 AM    THER/PROPH/DIAG INJ SC/IM    Reviewed

 

                    2015 12:00 AM    B12 Injection, Up to 1000 Mcg NDC#4504-0069-19    Reviewed

 

                    2011 12:00 AM    THER/PROPH/DIAG INJ SC/IM    Reviewed

 

                    2011 12:00 AM    B12 Injection([place]) Ndc#6530-6533-51-    Reviewed

 

                    2015 12:00 AM    THER/PROPH/DIAG INJ SC/IM    Reviewed

 

                    2015 12:00 AM    B12 Injection, Up to 1000 Mcg NDC#8567-6873-18    Reviewed

 

                    10/20/2011 12:00 AM    THER/PROPH/DIAG INJ SC/IM    Reviewed

 

                    10/20/2011 12:00 AM    B12 Injection([place]) Ndc#6816-1619-84-    Reviewed

 

                    2016 12:00 AM    THER/PROPH/DIAG INJ SC/IM    Reviewed

 

                    2016 12:00 AM    B12 Injection, Up to 1000 Mcg NDC#6425-0083-76    Reviewed

 

                    3/14/2016 12:00 AM    VITAMIN B-12        Reviewed

 

                    3/15/2016 12:00 AM    THER/PROPH/DIAG INJ SC/IM    Reviewed

 

                    3/15/2016 12:00 AM    B12 Injection, Up to 1000 Mcg NDC#1280-2942-65    Reviewed

 

                    2011 12:00 AM    ***Immunization administration, Medicare flu    Reviewed

 

                    2011 12:00 AM    Fluzone ** MEDICARE Only **    Reviewed

 

                    2011 12:00 AM    THER/PROPH/DIAG INJ SC/IM    Reviewed

 

                    2011 12:00 AM    B12 Injection ([employer]) Ndc#3930-8752-55    Reviewed

 

                    2016 12:00 AM    B12 Injection, Up to 1000 Mcg NDC#3324-7929-92 [facility]    

Reviewed

 

                    2016 12:00 AM    TTE W/DOPPLER COMPLETE    Reviewed

 

                    2016 12:00 AM    EXTREMITY STUDY     Reviewed

 

                          2016 12:00 AM        B12 Injection, Up to 1000 Mcg NDC#3505-2825-35 [facility] 

                                        Reviewed

 

                    2016 12:00 AM    THER/PROPH/DIAG INJ SC/IM    Reviewed

 

                    2016 12:00 AM    B12 Injection, Up to 1000 Mcg NDC#5928-4253-04    Reviewed

 

                    2016 12:00 AM    THER/PROPH/DIAG INJ SC/IM    Reviewed

 

                    2012 12:00 AM    B12 Injection([place]) Ndc#6266-0225-39-    Reviewed

 

                    2016 12:00 AM    B12 Injection, Up to 1000 Mcg NDC#4859-5549-92    Reviewed

 

                    2016 12:00 AM    THER/PROPH/DIAG INJ SC/IM    Reviewed

 

                    2012 12:00 AM    THER/PROPH/DIAG INJ SC/IM    Reviewed

 

                    2012 12:00 AM    B12 Injection ([employer]) Ndc#7759-1775-55    Reviewed

 

                    2016 12:00 AM    THER/PROPH/DIAG INJ SC/IM    Reviewed

 

                    2016 12:00 AM    B12 Injection, Up to 1000 Mcg NDC#8906-3129-08    Reviewed

 

                    2016 12:00 AM    B12 Injection, Up to 1000 Mcg NDC#0951-3034-70    Reviewed

 

                    2016 12:00 AM    THER/PROPH/DIAG INJ SC/IM    Reviewed

 

                    2012 12:00 AM    THER/PROPH/DIAG INJ SC/IM    Reviewed

 

                    2012 12:00 AM    B12 Injection([place]) Ndc#2230-8203-99-    Reviewed

 

                    12/15/2016 12:00 AM    B12 Injection, Up to 1000 Mcg NDC#9472-6179-45    Reviewed

 

                    12/15/2016 12:00 AM    THER/PROPH/DIAG INJ SC/IM    Reviewed

 

                    2016 12:00 AM    URNLS DIP STICK/TABLET RGNT AUTO W/O MICROSCOPY    Reviewed

 

                    1/3/2017 12:00 AM    URNLS DIP STICK/TABLET RGNT AUTO W/O MICROSCOPY    Reviewed

 

                    2017 12:00 AM    URINE CULTURE/COLONY COUNT    Reviewed

 

                    2017 12:00 AM    Rocephin 1 gram Injection, [facility]    Reviewed

 

                    2017 12:00 AM    THERAPEUTIC PROPHYLACTIC/DX INJECTION SUBQ/IM    Reviewed

 

                    2017 12:00 AM    B12 1000mcg Injection, [facility]    Reviewed

 

                    5/3/2012 12:00 AM    THER/PROPH/DIAG INJ SC/IM    Reviewed

 

                    5/3/2012 12:00 AM    B12 Injection([facility]) Ndc#4836-0311-45-    Reviewed

 

                    2017 12:00 AM    THERAPEUTIC PROPHYLACTIC/DX INJECTION SUBQ/IM    Reviewed

 

                    2017 12:00 AM    B12 1000mcg Injection, [facility] Medicare    Reviewed

 

                    2017 12:00 AM    MRI BRAIN STEM W/O & W/DYE    Reviewed

 

                    2017 12:00 AM    VITAMIN B-12        Reviewed

 

                    2017 12:00 AM    Speech Therapy Consult    Reviewed

 

                    2017 12:00 AM    ASSAY OF CREATININE    Reviewed

 

                    2012 12:00 AM    IMMUNOTHERAPY INJECTIONS    Reviewed

 

                    2012 12:00 AM    B12 Injection([facility]) Ndc#9350-1943-94-    Reviewed

 

                    2017 12:00 AM    URINALYSIS AUTO W/SCOPE    Reviewed

 

                    2012 12:00 AM    THER/PROPH/DIAG INJ SC/IM    Reviewed

 

                    2012 12:00 AM    B12 Injection, Up to 1000 Mcg NDC#2614-8012-17    Reviewed

 

                    2017 12:00 AM    URINALYSIS AUTO W/SCOPE    Reviewed

 

                    2017 2:18 PM    URINALYSIS AUTO W/O SCOPE    Reviewed

 

                    2017 12:00 AM    URINE CULTURE/COLONY COUNT    Reviewed

 

                    2017 12:00 AM    B12 1000mcg Injection    Reviewed

 

                    2017 12:00 AM    URNLS DIP STICK/TABLET RGNT AUTO W/O MICROSCOPY    Returned

 

                    2012 12:00 AM    THER/PROPH/DIAG INJ SC/IM    Reviewed

 

                    2012 12:00 AM    B12 Injection, Up to 1000 Mcg NDC#0057-1777-66    Reviewed

 

                    2012 12:00 AM    THER/PROPH/DIAG INJ SC/IM    Reviewed

 

                    2012 12:00 AM    B12 Injection, Up to 1000 Mcg NDC#9680-1717-57    Reviewed

 

                    10/16/2012 12:00 AM    THER/PROPH/DIAG INJ SC/IM    Reviewed

 

                    10/16/2012 12:00 AM    B12 Injection, Up to 1000 Mcg NDC#9726-1572-02    Reviewed

 

                    2010 12:00 AM    COMPREHEN METABOLIC PANEL    Reviewed

 

                    2010 12:00 AM    COMPLETE CBC W/AUTO DIFF WBC    Reviewed

 

                    2010 12:00 AM    LIPID PANEL         Reviewed

 

                    2013 12:00 AM    Flu Injection 3 Years And Above NDC# [address]-98  [facility]    Reviewed



 

                    2013 12:00 AM    COMPLETE CBC W/AUTO DIFF WBC    Reviewed

 

                    2013 12:00 AM    ASSAY OF LITHIUM    Reviewed

 

                    2013 12:00 AM    METABOLIC PANEL TOTAL CA    Reviewed

 

                    4/3/2013 12:00 AM    THER/PROPH/DIAG INJ SC/IM    Reviewed

 

                    4/3/2013 12:00 AM    B12 Injection, Up to 1000 Mcg NDC#7917-5488-09    Reviewed

 

                    2013 12:00 AM    THER/PROPH/DIAG INJ SC/IM    Reviewed

 

                    2013 12:00 AM    B12 Injection, Up to 1000 Mcg NDC#7125-0745-20    Reviewed

 

                    2013 12:00 AM    THER/PROPH/DIAG INJ SC/IM    Reviewed

 

                    2013 12:00 AM    B12 Injection, Up to 1000 Mcg NDC#3345-7663-13    Reviewed

 

                    2013 12:00 AM    LIPID PANEL         Reviewed

 

                    2013 12:00 AM    VITAMIN D 25 HYDROXY    Reviewed

 

                    2013 12:00 AM    THER/PROPH/DIAG INJ SC/IM    Reviewed

 

                    2013 12:00 AM    B12 Injection, Up to 1000 Mcg NDC#0383-0141-22    Reviewed

 

                    2013 12:00 AM    THER/PROPH/DIAG INJ SC/IM    Reviewed

 

                    3/6/2014 12:00 AM    THER/PROPH/DIAG INJ SC/IM    Reviewed

 

                    2014 12:00 AM    THER/PROPH/DIAG INJ SC/IM    Reviewed

 

                    2014 12:00 AM    B12 Injection, Up to 1000 Mcg NDC#5946-9908-90    Reviewed

 

                    2010 12:00 AM    SKIN FUNGI CULTURE    Reviewed

 

                    10/9/2010 12:00 AM    COMPREHEN METABOLIC PANEL    Reviewed

 

                    10/9/2010 12:00 AM    LIPID PANEL         Reviewed

 

                    2010 12:00 AM    THER/PROPH/DIAG INJ SC/IM    Reviewed

 

                    2010 12:00 AM    B12 Injection Ndc#[address]-02 ([facility])    Reviewed

 

                    2010 12:00 AM    THER/PROPH/DIAG INJ SC/IM    Reviewed

 

                    2010 12:00 AM    Kenalog 40 Mg Im-Nd#[address]-90 ([facility])    Reviewed

 

                    10/15/2010 12:00 AM    FLU VACCINE 3 YRS & > IM    Reviewed

 

                    10/15/2010 12:00 AM    Admin.Of M/C Cov.Vaccine-Flu Vacc.    Reviewed

 

                    1/15/2011 12:00 AM    COMPLETE CBC W/AUTO DIFF WBC    Reviewed

 

                    1/15/2011 12:00 AM    COMPREHEN METABOLIC PANEL    Reviewed

 

                    1/15/2011 12:00 AM    LIPID PANEL         Reviewed

 

                    2014 12:00 AM    MAMMOGRAM SCREENING    Reviewed

 

                    2014 12:00 AM    Screening mammography, bilateral    Reviewed

 

                    7/10/2014 12:00 AM    THER/PROPH/DIAG INJ SC/IM    Reviewed

 

                    7/10/2014 12:00 AM    B12 Injection, Up to 1000 Mcg NDC#5454-7976-70    Reviewed

 

                    2011 12:00 AM    COMPLETE CBC W/AUTO DIFF WBC    Reviewed

 

                    2011 12:00 AM    COMPREHEN METABOLIC PANEL    Reviewed

 

                    2011 12:00 AM    LIPID PANEL         Reviewed

 

                    2014 12:00 AM    B12 Injection, Up to 1000 Mcg NDC#3517-6091-06    Reviewed

 

                    10/19/2014 12:00 AM    MAMMOGRAM SCREENING    Reviewed

 

                    10/19/2014 12:00 AM    Screening mammography, bilateral    Reviewed

 

                    10/16/2014 12:00 AM    B12 Injection, Up to 1000 Mcg NDC#8374-1666-73    Reviewed

 

                    10/16/2014 12:00 AM    COMPLETE CBC W/AUTO DIFF WBC    Reviewed

 

                    10/16/2014 12:00 AM    COMPREHEN METABOLIC PANEL    Reviewed

 

                    10/16/2014 12:00 AM    IMMUNOASSAY TUMOR     Reviewed

 

                    10/16/2014 12:00 AM    LIPID PANEL         Reviewed

 

                    10/16/2014 12:00 AM    ASSAY OF LITHIUM    Reviewed

 

                    10/16/2014 12:00 AM    MAMMOGRAM SCREENING    Reviewed

 

                    2011 12:00 AM    ASSAY OF PARATHORMONE    Reviewed

 

                    2011 12:00 AM    VITAMIN D 25 HYDROXY    Reviewed

 

                    2011 12:00 AM    ASSAY OF LITHIUM    Reviewed

 

                    2011 12:00 AM    METABOLIC PANEL TOTAL CA    Reviewed

 

                    2011 12:00 AM    CT HEAD/BRAIN W/O & W/DYE    Reviewed

 

                    3/23/2015 12:00 AM    PNEUMOCOCCAL VACC 13 GLENDY IM    Reviewed

 

                    3/23/2015 12:00 AM    Vitamin B12 injection    Reviewed

 

                    2011 12:00 AM    ASSAY OF LITHIUM    Reviewed

 

                    2011 12:00 AM    B12 Injection Ndc#[address]-33  [facility]    Reviewed

 

                    2015 12:00 AM    THER/PROPH/DIAG INJ SC/IM    Reviewed

 

                    2015 12:00 AM    B12 Injection, Up to 1000 Mcg NDC#3514-6274-98    Reviewed

 

                    2015 12:00 AM    COMPLETE CBC W/AUTO DIFF WBC    Reviewed

 

                    2015 12:00 AM    COMPREHEN METABOLIC PANEL    Reviewed

 

                    2015 12:00 AM    LIPID PANEL         Reviewed

 

                    2015 12:00 AM    ASSAY OF LITHIUM    Reviewed

 

                    2011 12:00 AM    VIT D 1 25-DIHYDROXY    Reviewed

 

                    2011 12:00 AM    VITAMIN B-12        Reviewed

 

                    2015 12:00 AM    B12 Injection, Up to 1000 Mcg NDC#6346-9957-50    Reviewed

 

                    2015 12:00 AM    THER/PROPH/DIAG INJ SC/IM    Reviewed

 

                    2015 12:00 AM    B12 Injection, Up to 1000 Mcg NDC#5316-7277-14    Reviewed

 

                    2011 12:00 AM    THER/PROPH/DIAG INJ SC/IM    Reviewed

 

                    2011 12:00 AM    B12 Injection ([employer] [facility]) Ndc#9671-3154-50    Reviewed

 

                    2015 12:00 AM    THER/PROPH/DIAG INJ SC/IM    Reviewed

 

                    2015 12:00 AM    B12 Injection, Up to 1000 Mcg NDC#5097-3119-39    Reviewed







Results Summary







                          Date and Description      Results

 

                          2004 12:00 AM        Colonoscopy-Women and Men over 50 Normal 

 

                          2008 12:00 AM         Pap Smear Declined 

 

                          10/7/2009 12:00 AM        Cholest Cry Stone Ql .0 %LDLc SerPl-mCnc 123.0 mg/dLHDLc

 SerPl-mCnc 34.0 mg/dLTrigl SerPl-mCnc 190.0 mg/dLGlucose SerPl-mCnc 78.0 mg/dL

 

                          2009 12:00 AM        Mammogram -Women over 40 Normal HIV1+2 Ab Ser Ql no risk 

 

                          2010 8:47 AM         Dexa Bone Scan Refused Aspirin reccommended Contraindication 



 

                          2010 8:48 AM         Depression Done 

 

                          2010 12:00 AM         Foot Exam-Diabetic Done 

 

                          2010 12:00 AM         Cholest Cry Stone Ql .0 %LDLc SerPl-mCnc 126.0 mg/dLGlucose

 SerPl-mCnc 102.0 mg/dL

 

                          2010 8:45 AM          TRIGLYCERIDES 122.0 mg/dLCHOLESTEROL 186.0 mg/dLHDL 36.0 mg/dLTOT

 CHOL/HDL 5.2 LDL (CALC) 126.0 mg/dLGLUCOSE 102.0 mg/dLSODIUM 143.0 
mmol/LPOTASSIUM 3.70 mmol/LCHLORIDE 111.0 mmol/LCO2 23.0 mmol/LBUN 10.0 
mg/dLCREATININE 0.80 mg/dLSGOT/AST 12.0 IU/LSGPT/ALT 11.0 IU/LALK PHOS 65.0 
IU/LTOTAL PROTEIN 7.20 g/dLALBUMIN 3.90 g/dLTOTAL BILI 0.50 mg/dLCALCIUM 10.20 
mg/dLAGE 59 GFR NonAA 73 GFR AA 88 eGFR >60 mL/min/1.73 m2eGFR AA* >60 WBC 5.7 
RBC 3.26 HGB 10.60 g/dLHCT 31.70 %MCV 97.0 fLMCH 32.50 pgMCHC 33.40 g/dLRDW SD 
47 RDW CV 13.30 %MPV 9.70 fLPLT 287 NRBC# 0.00 NRBC% 0.0 %NEUT 62.90 %%LYMP 
21.80 %%MONO 9.90 %%EOS 5.0 %%BASO 0.40 %#NEUT 3.56 #LYMP 1.23 #MONO 0.56 #EOS 
0.28 #BASO 0.02 MANUAL DIFF NOT IND 

 

                          2010 12:00 AM        Glucose SerPl-mCnc 96.0 mg/dLCholest Cry Stone Ql .0 %LDLc

 SerPl-mCnc 146.0 mg/dL

 

                          2010 8:26 AM         TRIGLYCERIDES 106.0 mg/dLCHOLESTEROL 199.0 mg/dLHDL 32.0 mg/dLTOT

 CHOL/HDL 6.2 LDL (CALC) 146.0 mg/dLGLUCOSE 96.0 mg/dLSODIUM 143.0 
mmol/LPOTASSIUM 4.0 mmol/LCHLORIDE 113.0 mmol/LCO2 24.0 mmol/LBUN 13.0 
mg/dLCREATININE 1.0 mg/dLSGOT/AST 11.0 IU/LSGPT/ALT 6.0 IU/LALK PHOS 56.0 
IU/LTOTAL PROTEIN 6.60 g/dLALBUMIN 3.80 g/dLTOTAL BILI 0.50 mg/dLCALCIUM 9.30 
mg/dLAGE 59 GFR NonAA 57 GFR AA 69 eGFR 57 eGFR AA* >60 

 

                          10/6/2010 12:00 AM        Cholest Cry Stone Ql .0 %LDLc SerPl-mCnc 111.0 mg/dLGlucose

 SerPl-mCnc 81.0 mg/dL

 

                          10/6/2010 2:45 PM         TRIGLYCERIDES 123.0 mg/dLCHOLESTEROL 178.0 mg/dLHDL 42.0 mg/dLTOT

 CHOL/HDL 4.2 LDL (CALC) 111.0 mg/dLGLUCOSE 81.0 mg/dLSODIUM 139.0 
mmol/LPOTASSIUM 4.10 mmol/LCHLORIDE 106.0 mmol/LCO2 24.0 mmol/LBUN 13.0 
mg/dLCREATININE 0.90 mg/dLSGOT/AST 13.0 IU/LSGPT/ALT 11.0 IU/LALK PHOS 61.0 
IU/LTOTAL PROTEIN 7.10 g/dLALBUMIN 3.90 g/dLTOTAL BILI 0.30 mg/dLCALCIUM 9.30 
mg/dLAGE 60 GFR NonAA 64 GFR AA 78 eGFR >60 mL/min/1.73 m2eGFR AA* >60 WBC 6.9 
RBC 3.59 HGB 11.50 g/dLHCT 35.30 %MCV 98.0 fLMCH 32.0 pgMCHC 32.60 g/dLRDW SD 46
 RDW CV 12.90 %MPV 9.90 fLPLT 311 NRBC# 0.00 NRBC% 0.0 %NEUT 64.90 %%LYMP 22.50 
%%MONO 7.20 %%EOS 5.10 %%BASO 0.30 %#NEUT 4.45 #LYMP 1.54 #MONO 0.49 #EOS 0.35 
#BASO 0.02 MANUAL DIFF NOT IND 

 

                          2011 12:00 AM         Mammogram -Women over 40 Ordered 

 

                          2011 10:25 AM        TRIGLYCERIDES 111.0 mg/dLCHOLESTEROL 195.0 mg/dLHDL 43.0 mg/dLTOT

 CHOL/HDL 4.5 LDL (CALC) 130.0 mg/dLWBC 5.3 RBC 3.76 HGB 12.0 g/dLHCT 37.80 %MCV
 101.0 fLMCH 31.90 pgMCHC 31.70 g/dLRDW SD 47 RDW CV 13.0 %MPV 9.70 fLPLT 259 
NRBC# 0.00 NRBC% 0.0 %NEUT 69.0 %%LYMP 17.60 %%MONO 8.30 %%EOS 4.70 %%BASO 0.40 
%#NEUT 3.63 #LYMP 0.93 #MONO 0.44 #EOS 0.25 #BASO 0.02 MANUAL DIFF NOT IND 
GLUCOSE 102.0 mg/dLSODIUM 146.0 mmol/LPOTASSIUM 4.20 mmol/LCHLORIDE 113.0 
mmol/LCO2 23.0 mmol/LBUN 15.0 mg/dLCREATININE 1.0 mg/dLSGOT/AST 12.0 
IU/LSGPT/ALT 17.0 IU/LALK PHOS 60.0 IU/LTOTAL PROTEIN 6.90 g/dLALBUMIN 4.20 
g/dLTOTAL BILI 0.40 mg/dLCALCIUM 9.70 mg/dLAGE 60 GFR NonAA 57 GFR AA 69 eGFR 57
 eGFR AA* >60 

 

                          2011 11:49 AM        Cholest Cry Stone Ql .0 %LDLc SerPl-mCnc 130.0 mg/dLHDLc

 SerPl-mCnc 43.0 mg/dLTrigl SerPl-mCnc 111.0 mg/dLGlucose SerPl-mCnc 102.0 mg/dL

 

                          2011 11:52 AM        Pap Smear Declined 

 

                          2011 11:28 AM        Lithium 2.080 mmol/LGLUCOSE 102.0 mg/dLSODIUM 135.0 mmol/LPOTASSIUM

 3.90 mmol/LCHLORIDE 106.0 mmol/LCO2 21.0 mmol/LBUN 12.0 mg/dLCREATININE 1.30 
mg/dLCALCIUM 10.70 mg/dLAGE 60 GFR NonAA 42 GFR AA 51 eGFR 42 eGFR AA* 51 

 

                          2011 8:58 AM          Lithium 0.690 mmol/L

 

                          2011 2:38 PM         VITAMIN B12 3483.0 pg/mL

 

                          2013 3:35 PM          WBC 5.1 RBC 3.73 HGB 11.70 g/dLHCT 36.40 %MCV 98.0 fLMCH 31.40

 pgMCHC 32.10 g/dLRDW SD 47 RDW CV 13.10 %MPV 9.80 fLPLT 224 NRBC# 0.00 NRBC% 
0.0 %NEUT 66.80 %%LYMP 19.10 %%MONO 9.0 %%EOS 4.90 %%BASO 0.20 %#NEUT 3.42 #LYMP
 0.98 #MONO 0.46 #EOS 0.25 #BASO 0.01 MANUAL DIFF NOT IND GLUCOSE 88.0 
mg/dLSODIUM 141.0 mmol/LPOTASSIUM 4.10 mmol/LCHLORIDE 110.0 mmol/LCO2 22.0 
mmol/LBUN 22.0 mg/dLCREATININE 1.10 mg/dLCALCIUM 9.80 mg/dLAGE 62 GFR NonAA 50 
GFR AA 61 eGFR 50 eGFR AA* 60 Lithium 0.760 mmol/L

 

                          2013 11:02 AM        TRIGLYCERIDES 106.0 mg/dLCHOLESTEROL 181.0 mg/dLHDL 46.0 mg/dLTOT

 CHOL/HDL 3.9 LDL (CALC) 114.0 mg/dLVITAMIN D 41.10 ng/mL

 

                          10/17/2014 10:10 AM       WBC 5.0 RBC 3.66 HGB 11.60 g/dLHCT 36.80 %.0 fLMCH 31.70

 pgMCHC 31.50 g/dLRDW SD 50 RDW CV 13.50 %MPV 10.10 fLPLT 209 NRBC# 0.00 NRBC% 
0.0 %NEUT 69.20 %%LYMP 21.0 %%MONO 6.40 %%EOS 3.20 %%BASO 0.20 %#NEUT 3.46 #LYMP
 1.05 #MONO 0.32 #EOS 0.16 #BASO 0.01 MANUAL DIFF NOT IND GLUCOSE 100.0 
mg/dLSODIUM 148.0 mmol/LPOTASSIUM 3.90 mmol/LCHLORIDE 114.0 mmol/LCO2 26.0 
mmol/LBUN 12.0 mg/dLCREATININE 1.20 mg/dLSGOT/AST 9.0 IU/LSGPT/ALT <6 IU/LALK 
PHOS 82.0 IU/LTOTAL PROTEIN 6.90 g/dLALBUMIN 4.0 g/dLTOTAL BILI 0.40 
mg/dLCALCIUM 10.50 mg/dLAGE 64 GFR NonAA 45 GFR AA 55 eGFR 45 eGFR AA* 55 
TRIGLYCERIDES 96.0 mg/dLCHOLESTEROL 155.0 mg/dLHDL 38.0 mg/dLTOT CHOL/HDL 4.1 
LDL (CALC) 98.0 mg/dLLithium 0.850 mmol/LCancer Antigen (CA) 125 8.30 U/mL

 

                          2015 10:25 AM        Lithium 0.790 mmol/LWBC 4.8 RBC 3.44 HGB 11.0 g/dLHCT 35.20 

%.0 fLMCH 32.0 pgMCHC 31.30 g/dLRDW SD 53 RDW CV 14.0 %MPV 9.30 fLPLT 210
 NRBC# 0.00 NRBC% 0.0 %NEUT 70.80 %%LYMP 17.20 %%MONO 8.10 %%EOS 3.50 %%BASO 
0.40 %#NEUT 3.41 #LYMP 0.83 #MONO 0.39 #EOS 0.17 #BASO 0.02 MANUAL DIFF NOT IND 
TRIGLYCERIDES 107.0 mg/dLCHOLESTEROL 174.0 mg/dLHDL 43.0 mg/dLTOT CHOL/HDL 4.0 
LDL (CALC) 110.0 mg/dLGLUCOSE 90.0 mg/dLSODIUM 145.0 mmol/LPOTASSIUM 3.80 
mmol/LCHLORIDE 115.0 mmol/LCO2 24.0 mmol/LBUN 17.0 mg/dLCREATININE 1.30 
mg/dLSGOT/AST 18.0 IU/LSGPT/ALT 17.0 IU/LALK PHOS 56.0 IU/LTOTAL PROTEIN 6.70 
g/dLALBUMIN 3.90 g/dLTOTAL BILI 0.40 mg/dLCALCIUM 9.80 mg/dLAGE 64 GFR NonAA 41 
GFR AA 50 eGFR 41 eGFR AA* 50 

 

                          2015 8:50 AM        WBC 5.8 RBC 3.29 HGB 10.70 g/dLHCT 34.0 %.0 fLMCH 32.50

 pgMCHC 31.50 g/dLRDW SD 52 RDW CV 13.60 %MPV 9.60 fLPLT 223 NRBC# 0.00 NRBC% 
0.0 %NEUT 69.60 %%LYMP 18.90 %%MONO 8.50 %%EOS 2.80 %%BASO 0.20 %#NEUT 4.03 
#LYMP 1.09 #MONO 0.49 #EOS 0.16 #BASO 0.01 MANUAL DIFF NOT IND Lithium 0.620 
mmol/LGLUCOSE 83.0 mg/dLSODIUM 139.0 mmol/LPOTASSIUM 3.90 mmol/LCHLORIDE 109.0 
mmol/LCO2 22.0 mmol/LBUN 19.0 mg/dLCREATININE 1.40 mg/dLSGOT/AST 19.0 
IU/LSGPT/ALT 21.0 IU/LALK PHOS 55.0 IU/LTOTAL PROTEIN 6.50 g/dLALBUMIN 3.90 
g/dLTOTAL BILI 0.50 mg/dLCALCIUM 9.60 mg/dLAGE 65 GFR NonAA 38 GFR AA 46 eGFR 38
 eGFR AA* 46 TRIGLYCERIDES 121.0 mg/dLCHOLESTEROL 192.0 mg/dLHDL 51.0 mg/dLTOT 
CHOL/HDL 3.8 .0 mg/dLFREE T4 0.79 TSH 1.210 uIU/mLHemoglobin A1c 5.40 
%Estim. Avg Glu (eAG) 108 

 

                          3/15/2016 8:08 AM         VITAMIN B12 696.0 pg/mL

 

                          3/23/2016 8:26 AM         WBC 7.0 RBC 3.61 HGB 11.80 g/dLHCT 37.70 %.0 fLMCH 32.70

 pgMCHC 31.30 g/dLRDW SD 49 RDW CV 12.50 %MPV 10.0 fLPLT 207 NRBC# 0.00 NRBC% 
0.0 %NEUT 73.60 %%LYMP 16.40 %%MONO 6.60 %%EOS 3.0 %%BASO 0.30 %#NEUT 5.15 #LYMP
 1.15 #MONO 0.46 #EOS 0.21 #BASO 0.02 MANUAL DIFF NOT IND Lithium 0.940 
mmol/LGLUCOSE 108.0 mg/dLSODIUM 143.0 mmol/LPOTASSIUM 4.30 mmol/LCHLORIDE 110.0 
mmol/LCO2 27.0 mmol/LBUN 16.0 mg/dLCREATININE 1.60 mg/dLSGOT/AST 13.0 
IU/LSGPT/ALT 7.0 IU/LALK PHOS 71.0 IU/LTOTAL PROTEIN 6.80 g/dLALBUMIN 4.0 
g/dLTOTAL BILI 0.20 mg/dLCALCIUM 10.40 mg/dLAGE 65 GFR NonAA 32 GFR AA 39 eGFR 
32 eGFR AA* 39 TRIGLYCERIDES 113.0 mg/dLCHOLESTEROL 169.0 mg/dLHDL 42.0 mg/dLTOT
 CHOL/HDL 4.0 LDL (CALC) 104.0 mg/dLFREE T4 0.86 TSH 2.20 uIU/mLHemoglobin A1c 
5.20 %Estim. Avg Glu (eAG) 103 

 

                          3/25/2016 9:17 AM         COLOR YELLOW APPEARANCE CLEAR SPEC GRAV 1.010 pH 7.0 PROTEIN 

NEGATIVE GLUCOSE NEGATIVE mg/dLKETONE NEGATIVE BILIRUBIN NEGATIVE BLOOD NEGATIVE
 NITRITE NEGATIVE [place] SCREEN SMALL MICRO IND? SEE BELOW WBC/HPF 0-5 RBC/HPF 
NEGATIVE CASTS/LPF NEGATIVE /LPFCRYSTALS NEGATIVE MUCOUS THRDS NEGATIVE BACTERIA
 NEGATIVE EPITH CELLS FEW SQUAMOUS /HPFTRICHOMONAS NEGATIVE YEAST NEGATIVE 

 

                          2016 6:00 AM        GLUCOSE 91.0 mg/dLSODIUM 143.0 mmol/LPOTASSIUM 3.60 mmol/LCHLORIDE

 112.0 mmol/LCO2 23.0 mmol/LBUN 22.0 mg/dLCREATININE 1.20 mg/dLSGOT/AST 15.0 
IU/LSGPT/ALT 12.0 IU/LALK PHOS 61.0 IU/LTOTAL PROTEIN 5.40 g/dLALBUMIN 3.10 
g/dLTOTAL BILI 0.40 mg/dLCALCIUM 8.40 mg/dLAGE 66 GFR NonAA 45 GFR AA 55 eGFR 45
 eGFR AA* 55 WBC 3.0 RBC 3.05 HGB 9.80 g/dLHCT 32.10 %.0 fLMCH 32.10 
pgMCHC 30.50 g/dLRDW SD 54 RDW CV 14.20 %MPV 10.10 fLPLT 170 NRBC# 0.00 NRBC% 
0.0 %NEUT 50.70 %%LYMP 32.60 %%MONO 10.50 %%EOS 5.90 %%BASO 0.30 %#NEUT 1.54 
#LYMP 0.99 #MONO 0.32 #EOS 0.18 #BASO 0.01 MANUAL DIFF NOT IND 

 

                          2016 2:09 PM        COLOR YELLOW APPEARANCE CLEAR SPEC GRAV 1.010 pH 5.0 PROTEIN

 30 GLUCOSE NEGATIVE mg/dLKETONE NEGATIVE BILIRUBIN NEGATIVE BLOOD LARGE NITRITE
 NEGATIVE [place] SCREEN MODERATE MICRO INDICATED? SEE BELOW WBC/HPF  RBC/HPF
 20-50 CASTS/LPF NEGATIVE /LPFCRYSTALS NEGATIVE MUCOUS THRDS NEGATIVE BACTERIA 
NEGATIVE EPITH CELLS FEW SQUAMOUS /HPFTRICHOMONAS NEGATIVE YEAST NEGATIVE CULT 
SET UP? YES 

 

                          1/3/2017 4:08 PM          COLOR YELLOW APPEARANCE HAZY SPEC GRAV 1.015 pH 6.0 PROTEIN 30

 GLUCOSE NEGATIVE mg/dLKETONE NEGATIVE BILIRUBIN NEGATIVE BLOOD MODERATE NITRITE
 NEGATIVE [place] SCREEN LARGE MICRO INDICATED? SEE BELOW WBC/-200 RBC/HPF 
5-10 CASTS/LPF NEGATIVE /LPFCRYSTALS NEGATIVE MUCOUS THRDS NEGATIVE BACTERIA 
NEGATIVE EPITH CELLS 1+ SQUAMOUS /HPFTRICHOMONAS NEGATIVE YEAST NEGATIVE CULT 
SET UP? YES 

 

                          2017 4:24 PM         CREATININE 1.50 mg/dLAGE 66 GFR NonAA 35 GFR AA 42 eGFR 35 eGFR

 AA* 42 VITAMIN B12 1324.0 pg/mL

 

                          2017 11:30 AM         GLUCOSE 93.0 mg/dLSODIUM 143.0 mmol/LPOTASSIUM 3.10 mmol/LCHLORIDE

 101.0 mmol/LCO2 29.0 mmol/LBUN 26.0 mg/dLCREATININE 1.50 mg/dLSGOT/AST 23.0 
IU/LSGPT/ALT 13.0 IU/LALK PHOS 66.0 IU/LTOTAL PROTEIN 7.70 g/dLALBUMIN 4.30 
g/dLTOTAL BILI 0.40 mg/dLCALCIUM 10.30 mg/dLAGE 66 GFR NonAA 35 GFR AA 42 eGFR 
35 eGFR AA* 42 TRIGLYCERIDES 147.0 mg/dLCHOLESTEROL 184.0 mg/dLHDL 44.0 mg/dLTOT
 CHOL/HDL 4.2 .0 mg/dLWBC 5.4 RBC 3.98 HGB 12.90 g/dLHCT 40.20 %.0
 fLMCH 32.40 pgMCHC 32.10 g/dLRDW SD 50 RDW CV 13.50 %MPV 9.30 fLPLT 210 NRBC# 
0.00 NRBC% 0.0 %NEUT 54.20 %%LYMP 30.70 %%MONO 9.10 %%EOS 5.20 %%BASO 0.40 
%#NEUT 2.94 #LYMP 1.66 #MONO 0.49 #EOS 0.28 #BASO 0.02 MANUAL DIFF NOT IND FREE 
T4 1.09 COLOR YELLOW APPEARANCE CLEAR SPEC GRAV <=1.005 pH 5.5 PROTEIN NEGATIVE 
GLUCOSE NEGATIVE mg/dLKETONE NEGATIVE BILIRUBIN NEGATIVE BLOOD NEGATIVE NITRITE 
NEGATIVE [place] SCREEN LARGE MICRO INDICATED? SEE BELOW WBC/HPF 10-20 RBC/HPF 0-5 
CASTS/LPF NEGATIVE /LPFCRYSTALS NEGATIVE MUCOUS THRDS NEGATIVE BACTERIA FEW 
EPITH CELLS 1+ SQUAMOUS /HPFTRICHOMONAS NEGATIVE YEAST NEGATIVE CULT SET UP? YES
 TSH 1.820 uIU/mL

 

                          2017 2:45 PM         COLOR YELLOW APPEARANCE CLEAR SPEC GRAV <=1.005 pH 6.0 PROTEIN

 NEGATIVE GLUCOSE NEGATIVE mg/dLKETONE NEGATIVE BILIRUBIN NEGATIVE BLOOD TRACE-
INTACT NITRITE NEGATIVE [place] SCREEN SMALL MICRO INDICATED? SEE BELOW WBC/HPF 0-5
 RBC/HPF NEGATIVE CASTS/LPF NEGATIVE /LPFCRYSTALS NEGATIVE MUCOUS THRDS NEGATIVE
 BACTERIA FEW EPITH CELLS FEW SQUAMOUS /HPFTRICHOMONAS NEGATIVE YEAST NEGATIVE 
CULT SET UP? YES 

 

                          2017 11:22 AM        COLOR YELLOW APPEARANCE CLEAR SPEC GRAV <=1.005 pH 6.5 PROTEIN

 NEGATIVE GLUCOSE NEGATIVE mg/dLKETONE NEGATIVE BILIRUBIN NEGATIVE BLOOD 
NEGATIVE NITRITE NEGATIVE LEUK SCREEN NEGATIVE MICRO INDICATED? NOT INDICATED 

 

                          2017 2:18 PM         Clarity Ur cloudy Color Ur dark yellow Glucose Ur-sCnc negative

 Bilirub Ur Ql Strip negative Ketones Ur Ql Strip negative Sp Gr Ur Qn 1.010 Hgb
 Ur Ql Strip trace-intact pH Ur-LsCnc 6.5 Prot Ur Ql Strip trace Urobilinogen 
Ur-mCnc 0.2 Nitrite Ur Ql Strip negative WBC Est Ur Ql Strip large 







History Of Immunizations







       Name    Date Admin    Mfg Name    Mfg Code    Trade Name    Lot#    Route    Inj    Vis Given    Vis

 Pub                                    CVX

 

        Influenza    2008    Not Entered    NE      Not Entered            Not Entered    Not Entered

                    1            999

 

        X       12/19/2008    [employer]    MSD     Pneumovax 23            Intramuscular    Not Entered

                    1            999

 

           Influenza    10/15/2010    [employer] [facility].    NOV        Fluvirin > 12 Years    

[id number]     Intramuscular    Left Deltoid    10/15/2010    2009    999

 

          X         3/23/2015    [facility]    [facility]       Prevnar 13    [id number]    Intramuscular

                Right Gluteous Medius    3/23/2015       2013       109







History of Past Illness







                    Name                Date of Onset       Comments

 

                    Peritoneal Neoplasm, Malignant                         

 

                    Hyperlipidemia                           

 

                    Bipolar disorder, unspecified                         

 

                    Artificial opening status; colostomy                         

 

                    B12 deficiency                           

 

                    Anemia, Pernicious                         

 

                    Arthritis unspecified                         

 

                    cervical cancer                          

 

                    Artificial opening status; colostomy    2010  1:10PM     

 

                    Bipolar disorder, unspecified    2010  1:10PM     

 

                    Hyperlipidemia      2010  1:10PM     

 

                    Anemia, Pernicious    2010  1:10PM     

 

                    Postoperative Follow-Up    2010  1:55PM     

 

                    Postoperative Follow-Up    Mar  8 2010 10:57AM     

 

                    Artificial opening status; colostomy    Mar  8 2010  1:19PM     

 

                    Peritoneal Neoplasm, Malignant    Mar  8 2010  1:19PM     

 

                    Artificial opening status; colostomy    2010  1:40PM     

 

                    Hyperlipidemia      2010  1:40PM     

 

                    Anemia, Pernicious    2010  1:40PM     

 

                    Peritoneal Neoplasm, Malignant    2010  1:40PM     

 

                    Arthritis unspecified    2010  1:40PM     

 

                    Anemia of Chronic Illness    2010  1:40PM     

 

                    Tinea corporis      2010  3:17PM     

 

                    Bipolar disorder, unspecified    2010  1:33PM     

 

                    Hyperlipidemia      2010  1:33PM     

 

                    Anemia, Pernicious    2010  1:33PM     

 

                    Peritoneal Neoplasm, Malignant    2010  1:33PM     

 

                    B12 deficiency      2010  1:33PM     

 

                    Ethmoidal Sinusitis, Acute    Sep 21 2010  3:53PM     

 

                    Wheezing            Sep 21 2010  3:53PM     

 

                    Flu                 Oct 15 2010  1:40PM     

 

                    Bipolar disorder, unspecified    Oct 15 2010  1:42PM     

 

                    Hyperlipidemia      Oct 15 2010  1:42PM     

 

                    Anemia, Pernicious    Oct 15 2010  1:42PM     

 

                    Peritoneal Neoplasm, Malignant    Oct 15 2010  1:42PM     

 

                    Bipolar disorder, unspecified    2011 12:01PM     

 

                    Hyperlipidemia      2011 12:01PM     

 

                    Anemia, Pernicious    2011 12:01PM     

 

                    Peritoneal Neoplasm, Malignant    2011 12:01PM     

 

                    Bipolar disorder, unspecified    Apr 15 2011 10:55AM     

 

                    Major Depression    2011 10:11AM     

 

                    Bipolar Disorder    2011 10:11AM     

 

                    Cancer              May 10 2011  4:16PM     

 

                    Major Depression    May 10 2011  3:16PM     

 

                    Bipolar Disorder    May 10 2011  3:16PM     

 

                    Hypercalcemia       May 23 2011  2:47PM     

 

                    Bipolar disorder, unspecified    May 23 2011  2:47PM     

 

                    Colon Cancer, Personal History    May 23 2011  2:47PM     

 

                    Bipolar Disorder    May 31 2011  4:39PM     

 

                    Depressive Disorder    2011 10:01AM     

 

                    Vitamin B12 deficiency    2011 10:01AM     

 

                    Vitamin D Deficiency    2011  5:07PM     

 

                    Anemia, Vitamin B12 Deficiency    2011  5:07PM     

 

                    B12 deficiency      2011  3:56PM     

 

                    Routine gynecological examination    Aug  4 2011  9:08AM     

 

                    Screening Examination for Breast Cancer    Aug  4 2011  9:08AM     

 

                    Tinea Corporis      Aug  4 2011  9:08AM     

 

                    Depressive Disorder    Sep 23 2011  8:47AM     

 

                    Contact Dermatitis    Sep 23 2011  8:47AM     

 

                    Anemia, Pernicious    Sep 23 2011  8:47AM     

 

                    B12 deficiency      Sep 23 2011  8:47AM     

 

                    B12 deficiency      Sep 27 2011  2:58PM     

 

                    B12 deficiency      Oct 20 2011  2:34PM     

 

                    Flu                 Dec  9 2011  3:16PM     

 

                    B12 deficiency      Dec  9 2011  3:17PM     

 

                    B12 deficiency      2012  4:52PM     

 

                    B12 deficiency      2012 11:10AM     

 

                    B12 deficiency      2012  3:37PM     

 

                    B12 deficiency      May  3 2012  4:10PM     

 

                    B12 deficiency      2012  2:54PM     

 

                    B12 deficiency      2012 11:23AM     

 

                    B12 deficiency      Aug  9 2012  2:08PM     

 

                    B12 deficiency      Sep  6 2012  4:36PM     

 

                    B12 deficiency      Oct 16 2012 10:23AM     

 

                    Flu                 Feb  4   3:11PM     

 

                    Bipolar disorder, unspecified    Feb  4   2:48PM     

 

                    Anemia, Pernicious    Feb  4   2:48PM     

 

                    B12 deficiency      Feb  4   2:48PM     

 

                    Extrapyramidal abnormal movement disorder    Feb  4   2:48PM     

 

                    B12 deficiency      Apr  3 2013 12:03PM     

 

                    Bipolar disorder, unspecified    May  7 2013  1:31PM     

 

                    Anemia, Pernicious    May  7 2013  1:31PM     

 

                    B12 deficiency      May  7 2013  1:31PM     

 

                    Extrapyramidal abnormal movement disorder    May  7 2013  1:31PM     

 

                    B12 deficiency      2013  3:42PM     

 

                    B12 deficiency      2013  1:31PM     

 

                    Hyperlipidemia      Aug  7 2013 10:37AM     

 

                    Vitamin D Deficiency    Aug  7 2013 10:37AM     

 

                    Bipolar disorder, unspecified    Aug  7 2013 10:37AM     

 

                    Anemia, Pernicious    Aug  7 2013 10:37AM     

 

                    B12 deficiency      Aug  7 2013 10:37AM     

 

                    B12 deficiency      Sep 25 2013 11:15AM     

 

                    B12 deficiency      Dec 11 2013  3:16PM     

 

                    B12 deficiency      Mar  6 2014  1:48PM     

 

                    B12 deficiency      May 21 2014  3:17PM     

 

                    Screening Examination for Breast Cancer    2014  3:23PM     

 

                    Periumbilical abdominal pain    2014  3:23PM     

 

                    B12 deficiency      Jul 10 2014  2:52PM     

 

                    Anemia, Vitamin B12 Deficiency    Aug 13 2014  4:50PM     

 

                    Bipolar disorder    Oct 16 2014 11:13AM     

 

                    Hyperlipidemia      Oct 16 2014 11:13AM     

 

                    Anemia, Pernicious    Oct 16 2014 11:13AM     

 

                    Peritoneal Neoplasm, Malignant    Oct 16 2014 11:13AM     

 

                    Screening breast examination    Oct 16 2014 11:13AM     

 

                    Weight loss         Oct 16 2014 11:13AM     

 

                    Anemia, Pernicious    Mar 23 2015  2:57PM     

 

                    B12 deficiency      Mar 23 2015  2:57PM     

 

                    Need for Prevnar vaccine    Mar 23 2015  2:57PM     

 

                    Bipolar disorder    Mar 23 2015  2:57PM     

 

                    Hyperlipidemia      Mar 23 2015  2:57PM     

 

                    Anemia, Pernicious    Mar 23 2015  2:57PM     

 

                    Peritoneal Neoplasm, Malignant    Mar 23 2015  2:57PM     

 

                    B12 deficiency      May  4 2015  4:48PM     

 

                    Hyperlipidemia      May 13 2015  9:56AM     

 

                    Anemia              May 13 2015  9:56AM     

 

                    Bipolar disorder    May 13 2015  9:56AM     

 

                    Bipolar disorder    May 14 2015  3:27PM     

 

                    Hyperlipidemia      May 14 2015  3:27PM     

 

                    Anemia, Pernicious    May 14 2015  3:27PM     

 

                    Peritoneal Neoplasm, Malignant    May 14 2015  3:27PM     

 

                    B12 deficiency      2015  2:20PM     

 

                    B12 deficiency      2015 11:34AM     

 

                    B12 deficiency      Aug 18 2015  9:06AM     

 

                    Tinea Corporis      Sep 18 2015  8:54AM     

 

                    B12 deficiency      Sep 18 2015  8:54AM     

 

                    B12 deficiency      2015 10:28AM     

 

                    Herpes zoster without complication    Dec  3 2015  9:52AM     

 

                    B12 deficiency      Dec 23 2015 11:21AM     

 

                    B12 deficiency      2016  4:51PM     

 

                    Vitamin B 12 deficiency    Mar 14 2016  5:35PM     

 

                    B12 deficiency      Mar 15 2016 12:14PM     

 

                    B12 deficiency      May  5 2016 11:30AM     

 

                    Edema               May  5 2016 11:30AM     

 

                    Dermatitis          May  5 2016 11:30AM     

 

                    Edema               May 17 2016  8:38AM     

 

                    Shortness of breath    May 17 2016  8:38AM     

 

                    Bilateral edema of lower extremity    2016  2:06PM     

 

                    B12 deficiency      2016  2:06PM     

 

                    B12 deficiency      2016 11:50AM     

 

                    B12 deficiency      2016 11:20AM     

 

                    Diarrhea            Aug  2 2016  3:13PM     

 

                    B12 deficiency      Aug 24 2016 11:10AM     

 

                    Encounter for screening mammogram for breast cancer    Aug 24 2016 11:44AM     

 

                    B12 deficiency      Sep 28 2016  2:35PM     

 

                    B12 deficiency      Dec 15 2016  2:02PM     

 

                    Dysuria             Dec 29 2016 12:14PM     

 

                    Hematuria           Fidencio  3 2017  1:33PM     

 

                    Constipation by delayed colonic transit    2017  1:52PM     

 

                    Ileus               2017  1:52PM     

 

                    UTI (urinary tract infection)    Fidencio 15 2017  3:39PM     

 

                    Acute cystitis with hematuria    2017 11:07AM     

 

                    B12 deficiency      2017 11:07AM     

 

                    B12 deficiency      2017 11:40AM     

 

                    B12 deficiency      2017  4:07PM     

 

                    Slurred speech      2017  3:07PM     

 

                    Vitamin B12 deficiency    2017  3:07PM     

 

                    Dysphagia, unspecified type    2017  3:07PM     

 

                    Hyperlipidemia      2017  3:07PM     

 

                    Dysuria             2017 12:01PM     

 

                    B12 deficiency      2017  2:08PM     

 

                    Dysuria             2017 10:58AM     

 

                    Hematuria           May 22 2017  1:36PM     

 

                    Depressive Disorder    May 22 2017  1:36PM     

 

                    Constipation        May 22 2017  1:36PM     

 

                    Fatigue             May 22 2017  1:36PM     

 

                    Urinary tract infection    May 30 2017  9:36AM     

 

                    Hypokalemia         May 30 2017 12:03PM     







Payers







           Insurance Name    Company Name    Plan Name    Plan Number    Policy Number    Policy Group

 Number                                 Start Date

 

                    Medicare [employer]    Medicare [facility]              [id number]              N/A

 

                          [employer]    [employer]                 [id number]

                                                    

 

                    Medicare Part A    Medicare - Lab/Xray              [id number]              2006

 

                    Medicare Part B    [employer] [facility] [place]              [id number]              2006

 

                          [employer]    [employer]                 50 percent

                                                    2009

 

                    [facility]              [id number]              N/A

 

                    Medicare Part A    Medicare Part A              [id number]              N/A

 

                    Medicare Part A    Medicare Part A              [id number]              2006









History of Encounters







                    Visit Date          Visit Type          Provider

 

                    2017           Office visit        Bhupinder Aspen DO

 

                    2017           Nurse visit         Bhupinder Aspen DO

 

                    2017           Office visit         

 

                    2017           Office visit        Bhupinder Aspen DO

 

                    2017           Nurse visit         Bhupinder Aspen DO

 

                    2017           Office visit        Radha Ontiveros APRN

 

                    1/15/2017           Office visit        Aj Tapia NP

 

                    2017            Office visit        Devin Masterson MD

 

                    2016          [facility]            Devin Masterson MD

 

                    12/15/2016          Nurse visit         Bhupinder Aspen DO

 

                    2016           Nurse visit         Bret Forte APRN

 

                    2016           Nurse visit         Bhupinder Aspen DO

 

                    2016            Office visit        Bhupinder Aspen DO

 

                    2016           Nurse visit         Bhupinder Aspen DO

 

                    2016           Office visit        Bret Forte APRN

 

                    2016            Office visit        Bhupinder Aspen DO

 

                    3/15/2016           Nurse visit         Bhupinder Aspen DO

 

                    2016            Nurse visit         Bhupinder Aspen DO

 

                    2015          Nurse visit         Bhupinder Aspen DO

 

                    12/3/2015           Office visit        Bhupinder Aspen DO

 

                    2015          Nurse visit         Bhupinder Aspen DO

 

                    2015           Office visit        Bhupinder Aspen DO

 

                    2015           Nurse visit         Bhupinder Aspen DO

 

                    2015            Nurse visit         Bhupinder Aspen DO

 

                    2015            Nurse visit         Bhupinder Aspen DO

 

                    2015           Office visit        Bhupinder Aspen DO

 

                    2015            Nurse visit         Bhupinder Aspen DO

 

                    3/23/2015           Office visit        Bhupinder Aspen DO

 

                    10/16/2014          Office visit        Bhupinder Aspen DO

 

                    2014           Nurse visit         Radha Ontiveros APRN

 

                    7/10/2014           Nurse visit         Bhupinder Aspen DO

 

                    2014           Office visit        Bhupinder Aspen DO

 

                    2014           Nurse visit         Bhupinder Aspen DO

 

                    3/6/2014            Nurse visit         Bhupinder Aspen DO

 

                    2014            [facility]            EARNEST Lopez MD

 

                    2013          Nurse visit         Bhupinder Aspen DO

 

                    2013           Nurse visit         Bhupinder Aspen DO

 

                    2013            Office visit        Bhupinder Aspen DO

 

                    2013            Nurse visit         Bhupinder Aspen DO

 

                    2013            Nurse visit         Bhupinder Aspen DO

 

                    2013            Office visit        Bhupinder Aspen DO

 

                    4/3/2013            Nurse visit         Bhupinder Aspen DO

 

                    2013            Office visit        Bhupinder Aspen DO

 

                    10/16/2012          Nurse visit         Bhupinder Aspen DO

 

                    2012            Nurse visit         Bhupinder Aspen DO

 

                    2012            Voided              Bhupinder Aspen DO

 

                    2012            Nurse visit         Bhupinder Aspen DO

 

                    2012            Nurse visit         Bhupinder Aspen DO

 

                    2012           Nurse visit         Bhupinder Aspen DO

 

                    5/3/2012            Nurse visit         Bhupinder Aspen DO

 

                    2012           Nurse visit         Bhupinder Aspen DO

 

                    2012           Nurse visit         Bhupinder Aspen DO

 

                    2012           Nurse visit         Bhupinder Aspen DO

 

                    2011           Nurse visit         Bhupinder Aspen DO

 

                    10/20/2011          Nurse visit         Bhupinder Aspen DO

 

                    2011           Office visit        Bhupinder Aspen DO

 

                    2011           Nurse visit         Radha GREEN

 

                    2011            Office visit        Bhupinder Aspen DO

 

                    2011           Nurse visit         Bhupinder Aspen DO

 

                    2011            Office visit        Bhupinder Aspen DO

 

                    2011           Office visit        Bhupinder Aspen DO

 

                    5/10/2011           Office visit        Bhupinder Aspen DO

 

                    2011           Office visit        Bhupinder Aspen DO

 

                    4/15/2011           Office visit        Devin Angel DO

 

                    2011           Office visit        Devin Angel DO

 

                    10/15/2010          Office visit        Devin Angel DO

 

                    2010           Office visit        Devin Angel DO

 

                    2010            Office visit        Devin Angel DO

 

                    2010           Office visit        Devin Angel DO

 

                    2010            Office visit        Devin Angel DO

 

                    3/8/2010            Office visit        Devin Masterson MD

 

                    2010            Surgery             Devin Masterson MD

 

                    2010            Office visit        Devin Angel DO

 

                    2010           Surgery             Devin Masterson MD

 

                    2010           Hospital            Devin Masterson MD

 

                    2010           [facility]            Devin Masterson MD

 

                    10/22/2009          Office visit        Devin Angel DO

## 2019-06-26 NOTE — XMS REPORT
MU2 Ambulatory Summary

                             Created on: 2015



Pauline Gan

External Reference #: [id number]

: 1950

Sex: Female



Demographics







                          Address                   [address]

[place]  [address]

 

                          Home Phone                [phone]

 

                          Preferred Language        English

 

                          Marital Status            Legally 

 

                          Church Affiliation     Unknown

 

                          Race                      White

 

                          Ethnic Group              Not  or 





Author







                          Author                    Bhupinder Louise

 

                          [facility]

 

                          Address                   [address]

[place]  [id number]



 

                          Phone                     [phone]







Care Team Providers







                    Care Team Member Name    Role                Phone

 

                    Bhupinder Louise    PCP                 Unavailable







Allergies and Adverse Reactions







                    Name                Reaction            Notes

 

                    NO KNOWN DRUG ALLERGIES                         







Plan of Treatment







             Planned Activity    Comments     Planned Date    Planned Time    Plan/Goal

 

             COMPLETE CBC W/AUTO DIFF WBC                 2013     12:00 AM      

 

             ASSAY OF LITHIUM                 2013     12:00 AM      

 

             METABOLIC PANEL TOTAL CA                 2013     12:00 AM      

 

             VITAMIN B-12                 2013     12:00 AM      

 

             ASSAY OF FOLIC ACID SERUM                 2013     12:00 AM      

 

             COMPLETE CBC W/AUTO DIFF WBC                 2015    12:00 AM      

 

             COMPREHEN METABOLIC PANEL                 2015    12:00 AM      

 

             LIPID PANEL                 2015    12:00 AM      

 

             ASSAY OF LITHIUM                 2015    12:00 AM      







Medications







                                        Active 

 

             Name         Start Date    Estimated Completion Date    SIG          Comments

 

                          syringe with needle (disp) Misc.(Non-Drug; Combo Route) Syringe 1 mL 25 X 1"    2011

                                        use for injection once a month     

 

                hydroxyzine HCl oral tablet 50 mg    10/16/2014                      take 1 tablet (50 mg) by oral 

route at bedtime                         

 

                Latuda oral tablet 20 mg                                    take 1 tablet (20 mg) by oral route once daily with

 food (at least 350 calories)            

 

             pravastatin oral tablet 40 mg    3/30/2015                 TAKE 1 TABLET BY MOUTH DAILY     

 

                lithium carbonate Oral capsule 300 mg    2015       take 1 capsule (300

 mg) by oral route 2 for 30 days         









                                         

 

             Name         Start Date    Expiration Date    SIG          Comments

 

             Reglan 10mg    3/29/2010    2010    one ac and hs     

 

                Keflex Oral Capsule 500 mg    2010       10/1/2010       take 1 capsule (500 mg) by oral

 route every 6 hours for 10 days         

 

                Bactrim DS Oral Tablet 800-160 mg    2011       take 1 tablet by oral route

 every 12 hours for 7 days               

 

                triamcinolone acetonide Topical Cream 0.1 %    2011      apply a thin

 layer to the affected area(s) by topical route 2 times per day     

 

                sertraline Oral tablet 100 mg    4/10/2012       5/10/2012       take 1.5 tablets by oral route

 daily for 30 days                       

 

                ergocalciferol (vitamin D2) Oral capsule 50,000 unit    4/15/2013       2013       TAKE

 ONE CAPSULE BY MOUTH ONCE A WEEK        

 

                CYANOCOBALAM 1000MCGINJ 1000 milliliter    2013       INJECT 1ML INTRAMUSCULAR

 ONCE A MONTH                            

 

                pravastatin Oral tablet 40 mg    3/25/2014       3/20/2015       TAKE ONE TABLET BY MOUTH EVERY

 DAY                                     

 

                          Zostavax (PF) subcutaneous suspension for reconstitution 19,400 unit/0.65 mL    3/23/2015

                    3/24/2015           inject 0.65 milliliter by subcutaneous route once     









                                        Discontinued 

 

             Name         Start Date    Discontinued Date    SIG          Comments

 

                Tylenol Oral Tablet 325 mg                    2013        take 1 - 2 tablets (325 -650 mg) by oral

 route every 4-6 hours as needed         

 

                Calcium 600 + D(3) Oral Tablet 600-400 mg-unit                    2011       take 1 tablet by oral

 route 2 times a day                    no longer taking

 

                Vitamin B-12 Oral Tablet Sustained Release 1,000 mcg    2010       take 

1 tablet by oral route daily            no longer taking

 

                Antifungal (Clotrimazole) Topical Cream 1 %    2010       apply to the 

affected and surrounding areas of skin by topical route 2 times per day morning 
and evening                              

 

                sertraline Oral Tablet 100 mg    5/10/2011       2011       take 2 tablets (200 mg) by 

oral route once daily                   discontinued by Dr. Serrano

 

                mirtazapine Oral Tablet 15 mg                    2011        take 1 tablet (15 mg) by oral route 

once daily before bedtime               Dr. Serrano

 

                mirtazapine Oral Tablet 15 mg                    2011        take 1 tablet (15 mg) by oral route 

once daily before bedtime               dc'd by Dr. Zach Harp Oral Tablet Extended Release 24 hr 50 mg                    2013        take 1 tablet (50

 mg) by oral route once daily           Dr. Zach Stoutiq Oral Tablet Extended Release 24 hr 50 mg                    2013        take 1 tablet (50

 mg) by oral route once daily           dose updated

 

                Vitamin B-12 Injection Solution 1,000 mcg/mL    2011        inject 1 milliliter

 (1,000 mcg) by intramuscular route once a month    on list already

 

                clotrimazole Topical Cream 1 %    2011        apply to the affected and surrounding

 areas of skin by topical route 2 times per day in the morning and evening     

 

                Vitamin D Oral Capsule 50,000 unit    2011        take 1 capsule (50,000 

unit) by oral route once weekly         generic on list

 

                Pravachol Oral Tablet 40 mg    2012        take 1 tablet (40 mg) by oral 

route once daily for 90 days            generic on list

 

                lithium carbonate Oral Capsule 300 mg    2012        take 1 capsule by oral

 route daily                            dose updated

 

                Pristiq Oral tablet extended release 24 hr 100 mg                    4/10/2012       take 1 and 1/2 

tablet (150 mg) by oral route once daily    [facility] provider

 

                Pristiq Oral tablet extended release 24 hr 100 mg                    4/10/2012       take 1 and 1/2 

tablet (150 mg) by oral route once daily    [facility]







Problem List







                    Description         Status              Onset

 

                    Artificial opening status; colostomy    Active               

 

                    Bipolar disorder, unspecified    Active               

 

                    Hyperlipidemia      Active               

 

                    Peritoneal Neoplasm, Malignant    Active               

 

                    Anemia, Pernicious    Active               

 

                    Arthritis unspecified    Active               

 

                    B12 deficiency      Active               







Vital Signs







      Date    Time    BP-Sys(mm[Hg]    BP-Lynn(mm[Hg])    HR(bpm)    RR(rpm)    Temp    WT    HT    HC    BMI

                    BSA                 BMI Percentile      O2 Sat(%)

 

       3/23/2015    2:55:00 PM    130 mmHg    76 mmHg    68 bpm    18 rpm    97 F    140 lbs    69 in    

                20.67 kg/m2     1.76 m2                         98 %

 

        10/16/2014    11:11:00 AM    120 mmHg    66 mmHg    77 bpm    20 rpm    98 F    130 lbs    69 in

                          19.1974 kg/m    1.6943 m                 100 %

 

        2014    3:21:00 PM    130 mmHg    66 mmHg    63 bpm    18 rpm    97.2 F    160 lbs    69 in

                          23.63 kg/m2    1.88 m2                   99 %

 

        2013    10:35:00 AM    132 mmHg    70 mmHg    66 bpm    20 rpm    98.1 F    157 lbs    69 in

                          23.1846 kg/m    1.862 m                  

 

        2013    1:29:00 PM    132 mmHg    70 mmHg    76 bpm    18 rpm    98.2 F    166 lbs    69 in 

                          24.51 kg/m2    1.91 m2                    

 

       2013    2:46:00 PM    128 mmHg    70 mmHg    76 bpm    16 rpm    98 F    160 lbs    69 in     

                23.6276 kg/m    1.8797 m                       

 

        2011    8:49:00 AM    128 mmHg    78 mmHg    70 bpm    18 rpm    97.9 F    164 lbs    69 in

                          24.22 kg/m2    1.90 m2                    

 

     2011    1:31:00 PM    132 mmHg    68 mmHg    84 bpm         97 F    167 lbs                        

                                         

 

        2011    9:09:00 AM    128 mmHg    70 mmHg    72 bpm    18 rpm    98.2 F    163 lbs    64 in 

                          27.98 kg/m2    1.83 m2                    

 

       2011    10:01:00 AM    132 mmHg    70 mmHg    72 bpm    18 rpm    98.2 F    154 lbs             

                                                                 

 

       2011    2:47:00 PM    128 mmHg    70 mmHg    72 bpm    18 rpm    97.8 F    156 lbs             

                                                                 

 

       5/10/2011    3:16:00 PM    144 mmHg    80 mmHg    72 bpm    18 rpm    98.2 F    158 lbs             

                                                                 

 

        2011    10:11:00 AM    132 mmHg    70 mmHg    70 bpm    18 rpm    98.2 F    168 lbs    69 in

                          24.81 kg/m2    1.93 m2                    

 

        4/15/2011    10:52:00 AM    110 mmHg    60 mmHg    75 bpm    16 rpm    97.5 F    172.375 lbs    

69 in                     25.4551 kg/m    1.951 m                 100 %

 

        2011    11:43:00 AM    120 mmHg    82 mmHg    75 bpm    16 rpm    97.2 F    178.5 lbs    69

 in                       26.36 kg/m2    1.99 m2                   100 %

 

        10/15/2010    1:32:00 PM    120 mmHg    70 mmHg    80 bpm    18 rpm    96.6 F    177 lbs    69 in

                          26.1381 kg/m    1.977 m                 100 %

 

        2010    3:50:00 PM    168 mmHg    100 mmHg    82 bpm    18 rpm    97.8 F    177.5 lbs    69

 in                       26.21 kg/m2    1.98 m2                   97 %

 

        2010    1:21:00 PM    140 mmHg    80 mmHg    59 bpm    16 rpm    97.6 F    173.25 lbs    69 

in                        25.5843 kg/m    1.956 m                 100 %

 

        2010    3:02:00 PM    140 mmHg    80 mmHg    61 bpm    16 rpm    97.6 F    173.125 lbs    69

 in                       25.57 kg/m2    1.96 m2                   99 %

 

        2010    1:23:00 PM    130 mmHg    80 mmHg    66 bpm    16 rpm    96.8 F    173 lbs    69 in 

                          25.5474 kg/m    1.9546 m                 100 %

 

        2010    12:58:00 PM    130 mmHg    88 mmHg    75 bpm    16 rpm    98.4 F    172.25 lbs    69

 in                       25.44 kg/m2    1.95 m2                   100 %







Social History







                    Name                Description         Comments

 

                    denies alcohol use                         

 

                    denies smoking                           

 

                    Denies illicit substance abuse                         

 

                    retired                                 direct care

 

                    Single                                   

 

                    Exercises regularly                         

 

                    Attended some college                         







History of Procedures







                    Date Ordered        Description         Order Status

 

                    2010 12:00 AM    COMPREHEN METABOLIC PANEL    Reviewed

 

                    2010 12:00 AM    COMPLETE CBC W/AUTO DIFF WBC    Reviewed

 

                    2010 12:00 AM    LIPID PANEL         Reviewed

 

                    2011 12:00 AM    MAMMOGRAM SCREENING    Reviewed

 

                    2011 12:00 AM    CYTOPATH C/V THIN LAYER    Reviewed

 

                    2011 12:00 AM    THER/PROPH/DIAG INJ SC/IM    Reviewed

 

                    10/20/2011 12:00 AM    THER/PROPH/DIAG INJ SC/IM    Reviewed

 

                    2011 12:00 AM    THER/PROPH/DIAG INJ SC/IM    Reviewed

 

                    2012 12:00 AM    THER/PROPH/DIAG INJ SC/IM    Reviewed

 

                    2012 12:00 AM    THER/PROPH/DIAG INJ SC/IM    Reviewed

 

                    5/3/2012 12:00 AM    THER/PROPH/DIAG INJ SC/IM    Reviewed

 

                    2012 12:00 AM    IMMUNOTHERAPY INJECTIONS    Reviewed

 

                    2012 12:00 AM    THER/PROPH/DIAG INJ SC/IM    Reviewed

 

                    2012 12:00 AM    THER/PROPH/DIAG INJ SC/IM    Reviewed

 

                    2012 12:00 AM    THER/PROPH/DIAG INJ SC/IM    Reviewed

 

                    10/16/2012 12:00 AM    THER/PROPH/DIAG INJ SC/IM    Reviewed

 

                    2010 12:00 AM    COMPREHEN METABOLIC PANEL    Reviewed

 

                    2010 12:00 AM    COMPLETE CBC W/AUTO DIFF WBC    Reviewed

 

                    2010 12:00 AM    LIPID PANEL         Reviewed

 

                    2013 12:00 AM    COMPLETE CBC W/AUTO DIFF WBC    Reviewed

 

                    2013 12:00 AM    ASSAY OF LITHIUM    Reviewed

 

                    2013 12:00 AM    METABOLIC PANEL TOTAL CA    Reviewed

 

                    4/3/2013 12:00 AM    THER/PROPH/DIAG INJ SC/IM    Reviewed

 

                    2013 12:00 AM    THER/PROPH/DIAG INJ SC/IM    Reviewed

 

                    2013 12:00 AM    THER/PROPH/DIAG INJ SC/IM    Reviewed

 

                    2013 12:00 AM    LIPID PANEL         Reviewed

 

                    2013 12:00 AM    VITAMIN D 25 HYDROXY    Reviewed

 

                    2013 12:00 AM    THER/PROPH/DIAG INJ SC/IM    Reviewed

 

                    3/6/2014 12:00 AM    THER/PROPH/DIAG INJ SC/IM    Reviewed

 

                    2014 12:00 AM    THER/PROPH/DIAG INJ SC/IM    Reviewed

 

                    2010 12:00 AM    SKIN FUNGI CULTURE    Reviewed

 

                    10/9/2010 12:00 AM    COMPREHEN METABOLIC PANEL    Reviewed

 

                    10/9/2010 12:00 AM    LIPID PANEL         Reviewed

 

                    2010 12:00 AM    THER/PROPH/DIAG INJ SC/IM    Reviewed

 

                    2010 12:00 AM    THER/PROPH/DIAG INJ SC/IM    Reviewed

 

                    10/15/2010 12:00 AM    FLU VACCINE 3 YRS & > IM    Reviewed

 

                    1/15/2011 12:00 AM    COMPLETE CBC W/AUTO DIFF WBC    Reviewed

 

                    1/15/2011 12:00 AM    COMPREHEN METABOLIC PANEL    Reviewed

 

                    1/15/2011 12:00 AM    LIPID PANEL         Reviewed

 

                    2014 12:00 AM    MAMMOGRAM SCREENING    Reviewed

 

                    7/10/2014 12:00 AM    THER/PROPH/DIAG INJ SC/IM    Reviewed

 

                    2011 12:00 AM    COMPLETE CBC W/AUTO DIFF WBC    Reviewed

 

                    2011 12:00 AM    COMPREHEN METABOLIC PANEL    Reviewed

 

                    2011 12:00 AM    LIPID PANEL         Reviewed

 

                    10/19/2014 12:00 AM    MAMMOGRAM SCREENING    Reviewed

 

                    10/16/2014 12:00 AM    COMPLETE CBC W/AUTO DIFF WBC    Reviewed

 

                    10/16/2014 12:00 AM    COMPREHEN METABOLIC PANEL    Reviewed

 

                    10/16/2014 12:00 AM    IMMUNOASSAY TUMOR     Reviewed

 

                    10/16/2014 12:00 AM    LIPID PANEL         Reviewed

 

                    10/16/2014 12:00 AM    ASSAY OF LITHIUM    Reviewed

 

                    10/16/2014 12:00 AM    MAMMOGRAM SCREENING    Reviewed

 

                    2011 12:00 AM    ASSAY OF PARATHORMONE    Reviewed

 

                    2011 12:00 AM    VITAMIN D 25 HYDROXY    Reviewed

 

                    2011 12:00 AM    ASSAY OF LITHIUM    Reviewed

 

                    2011 12:00 AM    METABOLIC PANEL TOTAL CA    Reviewed

 

                    2011 12:00 AM    CT HEAD/BRAIN W/O & W/DYE    Reviewed

 

                    3/23/2015 12:00 AM    PNEUMOCOCCAL VACC 13 GLENDY IM    Reviewed

 

                    2011 12:00 AM    ASSAY OF LITHIUM    Reviewed

 

                    2015 12:00 AM    THER/PROPH/DIAG INJ SC/IM    Reviewed

 

                    2011 12:00 AM    VIT D 1 25-DIHYDROXY    Reviewed

 

                    2011 12:00 AM    VITAMIN B-12        Reviewed

 

                    2011 12:00 AM    THER/PROPH/DIAG INJ SC/IM    Reviewed







Results Summary







                          Data and Description      Results

 

                          2004 12:00 AM        Colonoscopy-Women and Men over 50 Normal 

 

                          2008 12:00 AM         Pap Smear Declined 

 

                          10/7/2009 12:00 AM        Cholest Cry Stone Ql .0 %LDLc SerPl-mCnc 123.0 mg/dLHDLc

 SerPl-mCnc 34.0 mg/dLTrigl SerPl-mCnc 190.0 mg/dLGlucose SerPl-mCnc 78.0 mg/dL

 

                          2009 12:00 AM        Mammogram -Women over 40 Normal HIV1+2 Ab Ser Ql no risk 

 

                          2010 8:47 AM         Dexa Bone Scan Refused Aspirin reccommended Contraindication 



 

                          2010 8:48 AM         Depression Done 

 

                          2010 12:00 AM         Foot Exam-Diabetic Done 

 

                          2010 12:00 AM         Cholest Cry Stone Ql .0 %LDLc SerPl-mCnc 126.0 mg/dLGlucose

 SerPl-mCnc 102.0 mg/dL

 

                          2010 8:45 AM          TRIGLYCERIDES 122.0 mg/dLCHOLESTEROL 186.0 mg/dLHDL 36.0 mg/dLLDL

 (CALC) 126.0 mg/dLGLUCOSE 102.0 mg/dLSODIUM 143.0 mmol/LPOTASSIUM 3.70 
mmol/LCHLORIDE 111.0 mmol/LCO2 23.0 mmol/LBUN 10.0 mg/dLCREATININE 0.80 
mg/dLSGOT/AST 12.0 IU/LSGPT/ALT 11.0 IU/LALK PHOS 65.0 IU/LTOTAL PROTEIN 7.20 
g/dLALBUMIN 3.90 g/dLTOTAL BILI 0.50 mg/dLCALCIUM 10.20 mg/dLeGFR >60 
mL/min/1.73 m2WBC 5.7 RBC 3.26 HGB 10.60 g/dLHCT 31.70 %MCV 97.0 fLMCH 32.50 
pgMCHC 33.40 g/dLRDW CV 13.30 %MPV 9.70 fLPLT 287 %NEUT 62.90 %%LYMP 21.80 
%%MONO 9.90 %%EOS 5.0 %%BASO 0.40 %#NEUT 3.56 #LYMP 1.23 #MONO 0.56 #EOS 0.28 
#BASO 0.02 

 

                          2010 12:00 AM        Glucose SerPl-mCnc 96.0 mg/dLCholest Cry Stone Ql .0 %LDLc

 SerPl-mCnc 146.0 mg/dL

 

                          2010 8:26 AM         TRIGLYCERIDES 106.0 mg/dLCHOLESTEROL 199.0 mg/dLHDL 32.0 mg/dLLDL

 (CALC) 146.0 mg/dLGLUCOSE 96.0 mg/dLSODIUM 143.0 mmol/LPOTASSIUM 4.0 
mmol/LCHLORIDE 113.0 mmol/LCO2 24.0 mmol/LBUN 13.0 mg/dLCREATININE 1.0 
mg/dLSGOT/AST 11.0 IU/LSGPT/ALT 6.0 IU/LALK PHOS 56.0 IU/LTOTAL PROTEIN 6.60 
g/dLALBUMIN 3.80 g/dLTOTAL BILI 0.50 mg/dLCALCIUM 9.30 mg/dLeGFR 57 

 

                          10/6/2010 12:00 AM        Cholest Cry Stone Ql .0 %LDLc SerPl-mCnc 111.0 mg/dLGlucose

 SerPl-mCnc 81.0 mg/dL

 

                          10/6/2010 2:45 PM         TRIGLYCERIDES 123.0 mg/dLCHOLESTEROL 178.0 mg/dLHDL 42.0 mg/dLLDL

 (CALC) 111.0 mg/dLGLUCOSE 81.0 mg/dLSODIUM 139.0 mmol/LPOTASSIUM 4.10 
mmol/LCHLORIDE 106.0 mmol/LCO2 24.0 mmol/LBUN 13.0 mg/dLCREATININE 0.90 
mg/dLSGOT/AST 13.0 IU/LSGPT/ALT 11.0 IU/LALK PHOS 61.0 IU/LTOTAL PROTEIN 7.10 
g/dLALBUMIN 3.90 g/dLTOTAL BILI 0.30 mg/dLCALCIUM 9.30 mg/dLeGFR >60 mL/min/1.73
 m2WBC 6.9 RBC 3.59 HGB 11.50 g/dLHCT 35.30 %MCV 98.0 fLMCH 32.0 pgMCHC 32.60 
g/dLRDW CV 12.90 %MPV 9.90 fLPLT 311 %NEUT 64.90 %%LYMP 22.50 %%MONO 7.20 %%EOS 
5.10 %%BASO 0.30 %#NEUT 4.45 #LYMP 1.54 #MONO 0.49 #EOS 0.35 #BASO 0.02 

 

                          2011 12:00 AM         Mammogram -Women over 40 Ordered 

 

                          2011 10:25 AM        TRIGLYCERIDES 111.0 mg/dLCHOLESTEROL 195.0 mg/dLHDL 43.0 mg/dLLDL

 (CALC) 130.0 mg/dLWBC 5.3 RBC 3.76 HGB 12.0 g/dLHCT 37.80 %.0 fLMCH 
31.90 pgMCHC 31.70 g/dLRDW CV 13.0 %MPV 9.70 fLPLT 259 %NEUT 69.0 %%LYMP 17.60 
%%MONO 8.30 %%EOS 4.70 %%BASO 0.40 %#NEUT 3.63 #LYMP 0.93 #MONO 0.44 #EOS 0.25 
#BASO 0.02 GLUCOSE 102.0 mg/dLSODIUM 146.0 mmol/LPOTASSIUM 4.20 mmol/LCHLORIDE 
113.0 mmol/LCO2 23.0 mmol/LBUN 15.0 mg/dLCREATININE 1.0 mg/dLSGOT/AST 12.0 
IU/LSGPT/ALT 17.0 IU/LALK PHOS 60.0 IU/LTOTAL PROTEIN 6.90 g/dLALBUMIN 4.20 
g/dLTOTAL BILI 0.40 mg/dLCALCIUM 9.70 mg/dLeGFR 57 

 

                          2011 11:49 AM        Cholest Cry Stone Ql .0 %LDLc SerPl-mCnc 130.0 mg/dLHDLc

 SerPl-mCnc 43.0 mg/dLTrigl SerPl-mCnc 111.0 mg/dLGlucose SerPl-mCnc 102.0 mg/dL

 

                          2011 11:52 AM        Pap Smear Declined 

 

                          2011 11:28 AM        Lithium 2.080 mmol/LGLUCOSE 102.0 mg/dLSODIUM 135.0 mmol/LPOTASSIUM

 3.90 mmol/LCHLORIDE 106.0 mmol/LCO2 21.0 mmol/LBUN 12.0 mg/dLCREATININE 1.30 
mg/dLCALCIUM 10.70 mg/dLeGFR 42 

 

                          2011 8:58 AM          Lithium 0.690 mmol/L

 

                          2011 2:38 PM         VITAMIN B12 3483.0 pg/mL

 

                          2013 3:35 PM          WBC 5.1 RBC 3.73 HGB 11.70 g/dLHCT 36.40 %MCV 98.0 fLMCH 31.40

 pgMCHC 32.10 g/dLRDW CV 13.10 %MPV 9.80 fLPLT 224 %NEUT 66.80 %%LYMP 19.10 
%%MONO 9.0 %%EOS 4.90 %%BASO 0.20 %#NEUT 3.42 #LYMP 0.98 #MONO 0.46 #EOS 0.25 
#BASO 0.01 GLUCOSE 88.0 mg/dLSODIUM 141.0 mmol/LPOTASSIUM 4.10 mmol/LCHLORIDE 
110.0 mmol/LCO2 22.0 mmol/LBUN 22.0 mg/dLCREATININE 1.10 mg/dLCALCIUM 9.80 
mg/dLeGFR 50 Lithium 0.760 mmol/L

 

                          2013 11:02 AM        TRIGLYCERIDES 106.0 mg/dLCHOLESTEROL 181.0 mg/dLHDL 46.0 mg/dLLDL

 (CALC) 114.0 mg/dLVITAMIN D 41.10 ng/mL

 

                          10/17/2014 10:10 AM       WBC 5.0 RBC 3.66 HGB 11.60 g/dLHCT 36.80 %.0 fLMCH 31.70

 pgMCHC 31.50 g/dLRDW CV 13.50 %MPV 10.10 fLPLT 209 %NEUT 69.20 %%LYMP 21.0 
%%MONO 6.40 %%EOS 3.20 %%BASO 0.20 %#NEUT 3.46 #LYMP 1.05 #MONO 0.32 #EOS 0.16 
#BASO 0.01 GLUCOSE 100.0 mg/dLSODIUM 148.0 mmol/LPOTASSIUM 3.90 mmol/LCHLORIDE 
114.0 mmol/LCO2 26.0 mmol/LBUN 12.0 mg/dLCREATININE 1.20 mg/dLSGOT/AST 9.0 
IU/LSGPT/ALT <6 IU/LALK PHOS 82.0 IU/LTOTAL PROTEIN 6.90 g/dLALBUMIN 4.0 
g/dLTOTAL BILI 0.40 mg/dLCALCIUM 10.50 mg/dLeGFR 45 TRIGLYCERIDES 96.0 
mg/dLCHOLESTEROL 155.0 mg/dLHDL 38.0 mg/dLLDL (CALC) 98.0 mg/dLLithium 0.850 
mmol/LCancer Antigen (CA) 125 8.30 U/mL







History Of Immunizations







       Name    Date Admin    Mfg Name    Mfg Code    Trade Name    Lot#    Route    Inj    Vis Given    Vis

 Pub                                    CVX

 

        Influenza    2008    Not Entered    NE      Not Entered            Not Entered    Not Entered

                    1            999

 

          Pneumococcal    2008    [employer]    MSD       Pneumovax 23              Intramuscular

                Not Entered     1        999

 

           Influenza    10/15/2010    Kutuan Arely.    NOV        Fluvirin > 12 Years    

[id number]     Intramuscular    Left Deltoid    10/15/2010    2009    999

 

          Pneumococcal    3/23/2015    [facility]    [facility]       Prevnar 13    [id number]    Intramuscular

                Right Gluteous Medius    3/23/2015       2013       109







History of Past Illness







                    Name                Date of Onset       Comments

 

                    Peritoneal Neoplasm, Malignant                         

 

                    Hyperlipidemia                           

 

                    Bipolar disorder, unspecified                         

 

                    Artificial opening status; colostomy                         

 

                    B12 deficiency                           

 

                    Anemia, Pernicious                         

 

                    Arthritis unspecified                         

 

                    cervical cancer                          

 

                    Artificial opening status; colostomy    2010  1:10PM     

 

                    Bipolar disorder, unspecified    2010  1:10PM     

 

                    Hyperlipidemia      2010  1:10PM     

 

                    Anemia, Pernicious    2010  1:10PM     

 

                    Postoperative Follow-Up    2010  1:55PM     

 

                    Postoperative Follow-Up    Mar  8 2010 10:57AM     

 

                    Artificial opening status; colostomy    Mar  8 2010  1:19PM     

 

                    Peritoneal Neoplasm, Malignant    Mar  8 2010  1:19PM     

 

                    Artificial opening status; colostomy    2010  1:40PM     

 

                    Hyperlipidemia      2010  1:40PM     

 

                    Anemia, Pernicious    2010  1:40PM     

 

                    Peritoneal Neoplasm, Malignant    2010  1:40PM     

 

                    Arthritis unspecified    2010  1:40PM     

 

                    Anemia of Chronic Illness    2010  1:40PM     

 

                    Tinea corporis      2010  3:17PM     

 

                    Bipolar disorder, unspecified    2010  1:33PM     

 

                    Hyperlipidemia      2010  1:33PM     

 

                    Anemia, Pernicious    2010  1:33PM     

 

                    Peritoneal Neoplasm, Malignant    2010  1:33PM     

 

                    B12 deficiency      2010  1:33PM     

 

                    Ethmoidal Sinusitis, Acute    Sep 21 2010  3:53PM     

 

                    Wheezing            Sep 21 2010  3:53PM     

 

                    Flu                 Oct 15 2010  1:40PM     

 

                    Bipolar disorder, unspecified    Oct 15 2010  1:42PM     

 

                    Hyperlipidemia      Oct 15 2010  1:42PM     

 

                    Anemia, Pernicious    Oct 15 2010  1:42PM     

 

                    Peritoneal Neoplasm, Malignant    Oct 15 2010  1:42PM     

 

                    Bipolar disorder, unspecified    2011 12:01PM     

 

                    Hyperlipidemia      2011 12:01PM     

 

                    Anemia, Pernicious    2011 12:01PM     

 

                    Peritoneal Neoplasm, Malignant    2011 12:01PM     

 

                    Bipolar disorder, unspecified    Apr 15 2011 10:55AM     

 

                    Major Depression    2011 10:11AM     

 

                    Bipolar Disorder    2011 10:11AM     

 

                    Cancer              May 10 2011  4:16PM     

 

                    Major Depression    May 10 2011  3:16PM     

 

                    Bipolar Disorder    May 10 2011  3:16PM     

 

                    Hypercalcemia       May 23 2011  2:47PM     

 

                    Bipolar disorder, unspecified    May 23 2011  2:47PM     

 

                    Colon Cancer, Personal History    May 23 2011  2:47PM     

 

                    Bipolar Disorder    May 31 2011  4:39PM     

 

                    Depressive Disorder    2011 10:01AM     

 

                    Vitamin B12 deficiency    2011 10:01AM     

 

                    Vitamin D Deficiency    2011  5:07PM     

 

                    Anemia, Vitamin B12 Deficiency    2011  5:07PM     

 

                    B12 deficiency      2011  3:56PM     

 

                    Routine gynecological examination    Aug  4 2011  9:08AM     

 

                    Screening Examination for Breast Cancer    Aug  4 2011  9:08AM     

 

                    Tinea Corporis      Aug  4 2011  9:08AM     

 

                    Depressive Disorder    Sep 23 2011  8:47AM     

 

                    Contact Dermatitis    Sep 23 2011  8:47AM     

 

                    Anemia, Pernicious    Sep 23 2011  8:47AM     

 

                    B12 deficiency      Sep 23 2011  8:47AM     

 

                    B12 deficiency      Sep 27 2011  2:58PM     

 

                    B12 deficiency      Oct 20 2011  2:34PM     

 

                    Flu                 Dec  9 2011  3:16PM     

 

                    B12 deficiency      Dec  9 2011  3:17PM     

 

                    B12 deficiency      2012  4:52PM     

 

                    B12 deficiency      Feb 2012 11:10AM     

 

                    B12 deficiency      2012  3:37PM     

 

                    B12 deficiency      May  3 2012  4:10PM     

 

                    B12 deficiency      2012  2:54PM     

 

                    B12 deficiency      2012 11:23AM     

 

                    B12 deficiency      Aug  9 2012  2:08PM     

 

                    B12 deficiency      Sep  6 2012  4:36PM     

 

                    B12 deficiency      Oct 16 2012 10:23AM     

 

                    Flu                 Feb  2013  3:11PM     

 

                    Bipolar disorder, unspecified    Feb  2013  2:48PM     

 

                    Anemia, Pernicious    Feb  2013  2:48PM     

 

                    B12 deficiency      Feb  2013  2:48PM     

 

                    Extrapyramidal abnormal movement disorder    Feb  2013  2:48PM     

 

                    B12 deficiency      Apr  3 2013 12:03PM     

 

                    Bipolar disorder, unspecified    May  7 2013  1:31PM     

 

                    Anemia, Pernicious    May  7 2013  1:31PM     

 

                    B12 deficiency      May  7 2013  1:31PM     

 

                    Extrapyramidal abnormal movement disorder    May  7 2013  1:31PM     

 

                    B12 deficiency      2013  3:42PM     

 

                    B12 deficiency      2013  1:31PM     

 

                    Hyperlipidemia      Aug  7 2013 10:37AM     

 

                    Vitamin D Deficiency    Aug  7 2013 10:37AM     

 

                    Bipolar disorder, unspecified    Aug  7 2013 10:37AM     

 

                    Anemia, Pernicious    Aug  7 2013 10:37AM     

 

                    B12 deficiency      Aug  7 2013 10:37AM     

 

                    B12 deficiency      Sep 25 2013 11:15AM     

 

                    B12 deficiency      Dec 11 2013  3:16PM     

 

                    B12 deficiency      Mar  6 2014  1:48PM     

 

                    B12 deficiency      May 21 2014  3:17PM     

 

                    Screening Examination for Breast Cancer    2014  3:23PM     

 

                    Periumbilical abdominal pain    2014  3:23PM     

 

                    B12 deficiency      Jul 10 2014  2:52PM     

 

                    Anemia, Vitamin B12 Deficiency    Aug 13 2014  4:50PM     

 

                    Bipolar disorder    Oct 16 2014 11:13AM     

 

                    Hyperlipidemia      Oct 16 2014 11:13AM     

 

                    Anemia, Pernicious    Oct 16 2014 11:13AM     

 

                    Peritoneal Neoplasm, Malignant    Oct 16 2014 11:13AM     

 

                    Screening breast examination    Oct 16 2014 11:13AM     

 

                    Weight loss         Oct 16 2014 11:13AM     

 

                    Anemia, Pernicious    Mar 23 2015  2:57PM     

 

                    B12 deficiency      Mar 23 2015  2:57PM     

 

                    Need for Prevnar vaccine    Mar 23 2015  2:57PM     

 

                    Bipolar disorder    Mar 23 2015  2:57PM     

 

                    Hyperlipidemia      Mar 23 2015  2:57PM     

 

                    Anemia, Pernicious    Mar 23 2015  2:57PM     

 

                    Peritoneal Neoplasm, Malignant    Mar 23 2015  2:57PM     

 

                    B12 deficiency      May  4 2015  4:48PM     

 

                    Hyperlipidemia      May 13 2015  9:56AM     

 

                    Anemia              May 13 2015  9:56AM     

 

                    Bipolar disorder    May 13 2015  9:56AM     







Payers







           Insurance Name    Company Name    Plan Name    Plan Number    Policy Number    Policy Group

 Number                                 Start Date

 

                    Medicare Part A    Medicare Part A              [id number]              N/A

 

                    [facility]              [id number]              N/A

 

                    Medicare Part B    [employer] [place]              [id number]              2006

 

                          [employer]    [employer]                 50 percent

                                                    2009







History of Encounters







                    Visit Date          Visit Type          Provider

 

                    2015            Nurse visit         Bhupinder Louise DO

 

                    3/23/2015           Office visit        Bhupinder Louise DO

 

                    10/16/2014          Office visit        Bhupinder Aspen DO

 

                    2014           Nurse visit         Radha GREEN

 

                    7/10/2014           Nurse visit         Bhupinder Aspen DO

 

                    2014           Office visit        Bhupinder Aspen DO

 

                    2014           Nurse visit         Bhupinder Sapen DO

 

                    3/6/2014            Nurse visit         Bhupinder Aspen DO

 

                    2014            [facility]            EARNEST Lopez MD

 

                    2013          Nurse visit         Bhupinder Aspen DO

 

                    2013           Nurse visit         Bhupinder Aspen DO

 

                    2013            Office visit        Bhupinder Aspen DO

 

                    2013            Nurse visit         Bhupinder Aspen DO

 

                    2013            Nurse visit         Bhupinder Aspen DO

 

                    2013            Office visit        Bhupinder Aspen DO

 

                    4/3/2013            Nurse visit         Bhupinder Aspen DO

 

                    2013            Office visit        Bhupinder Aspen DO

 

                    10/16/2012          Nurse visit         Bhupinder Aspen DO

 

                    2012            Voided              Bhupinder Nealte DO

 

                    2012            Nurse visit         Bhupinder Aspen DO

 

                    2012            Nurse visit         Bhupinder Aspen DO

 

                    2012            Nurse visit         Bhupinder Aspen DO

 

                    2012           Nurse visit         Bhupinder Aspen DO

 

                    5/3/2012            Nurse visit         Bhupinder Aspen DO

 

                    2012           Nurse visit         Bhupinder Aspen DO

 

                    2012           Nurse visit         Bhupinder Aspen DO

 

                    2012           Nurse visit         Bhupinder Aspen DO

 

                    2011           Nurse visit         Bhupinder Aspen DO

 

                    10/20/2011          Nurse visit         Bhupinder Aspen DO

 

                    2011           Office visit        Bhupinder Aspen DO

 

                    2011           Nurse visit         Radha Weston GREEN

 

                    2011            Office visit        Bhupinder Aspen DO

 

                    2011           Nurse visit         Bhupinder Aspen DO

 

                    2011            Office visit        Bhupinder Aspen DO

 

                    2011           Office visit        Bhupinder Aspen DO

 

                    5/10/2011           Office visit        Bhupinder Aspen DO

 

                    2011           Office visit        Bhupinder Aspen DO

 

                    4/15/2011           Office visit        Devin Angel DO

 

                    2011           Office visit        Devin Angel DO

 

                    10/15/2010          Office visit        Devin Angel DO

 

                    2010           Office visit        Devin Angel DO

 

                    2010            Office visit        Devin Angel DO

 

                    2010           Office visit        Devin Angel DO

 

                    2010            Office visit        Devin Angel DO

 

                    3/8/2010            Office visit        Devin Masterson MD

 

                    2010            Office visit        Devin Angel DO

 

                    2010            Surgery             Devin Masterson MD

 

                    2010           Surgery             Devin Masterson MD

 

                    2010           Hospital            Devin Masterson MD

 

                    2010           Hospital            Devin Masterson MD

 

                    10/22/2009          Office visit        Devin Angel DO

## 2019-06-26 NOTE — OPERATIVE REPORT
DATE OF SERVICE:  06/26/2019



PREOPERATIVE DIAGNOSES:

1.  Venous insufficiency.

2.  Malfunctioning port.

3.  Stomal mass.



POSTOPERATIVE DIAGNOSES:

1.  Venous insufficiency.

2.  Malfunctioning port.

3.  Stomal mass.

4.  Pending pathology.



PROCEDURES PERFORMED:

1.  Port removal.

2.  Excisional polypectomy of stomal mass.



SURGEON:

Goldy Jackson DO.



FIRST ASSISTANT:

None.



ANESTHESIA:

IV sedation by CRNA.



SPECIMEN:

Mass in stoma, probable polyp.



BLOOD LOSS:

Scant.



FLUIDS:

Per Anesthesia.



POSTOPERATIVE CONDITION:

Stable.



INDICATION FOR PROCEDURE:

The patient is a 68-year-old female, who has a port that is nonfunctioning and

she wants it removed.  She also noted a mass on her stoma.  It looked like maybe

a polyp and wanted this removed because she has a history of cancer.



FINDINGS:

The patient had a port removed and a mass removed from the stoma.



PROCEDURE NOTE:

After informed consent was obtained, the patient was brought to the operating

room, placed on the table in supine position.  She was sterilely prepped and

draped in normal fashion.  Local lidocaine was used to infiltrate the skin above

the port.  Started on this first, I then made an incision with #15 blade,

carried down through the skin into subcutaneous tissue right at the site of

previous incision, dissected down with Bovie electrocautery, able to grasp the

catheter and then start dissecting around the port and pulled the port out.  I

then started trying to pull the catheter out, unfortunately it was very stuck,

dissected down through muscle under the clavicle still very stuck while pulling

it, so continued to just gentle traction and it finally pulled out and removed

the catheter completely, and it came out.  At this point, then irrigated with

normal saline, suctioned this out.  Closed the muscle with 3-0 Vicryl

interrupted suture, then closed the subcutaneous tissue with 3-0 Vicryl, 2-0

interrupted subcuticular stitches, then closed the skin with 4-0 undyed Monocryl

3 interrupted subcuticular suture, area was cleaned and dried.  Dermabond was

placed.  I then opened the drape.  I stayed sterile.  The nurse then removed the

stoma bag.  I was able to then use a DeBakey to grab the mass, looked like it

was a polyp, cut this off with Metzenbaum scissors and then cauterized the

bleeding with Bovie electrocautery.  Bleeding stopped and then placed a bag back

on.  This was passed off table and sent to Pathology.  Sponge and needle count

correct at the end of the case.





Job ID: [id number]

DocumentID: [id number]

Dictated Date:  06/26/2019 08:44:46

Transcription Date: 06/26/2019 13:36:53

Dictated By: DO AYAH BACA

## 2019-06-26 NOTE — XMS REPORT
MU2 Ambulatory Summary

                             Created on: 2015



Pauline Gan

External Reference #: [id number]

: 1950

Sex: Female



Demographics







                          Address                   [address]

[place]  [address]

 

                          Home Phone                [phone]

 

                          Preferred Language        English

 

                          Marital Status            Legally 

 

                          Temple Affiliation     Unknown

 

                          Race                      White

 

                          Ethnic Group              Not  or 





Author







                          Author                    Bhupinder Louise

 

                          [facility]

 

                          Address                   [address]

[place], [place]  [id number]



 

                          Phone                     [phone]







Care Team Providers







                    Care Team Member Name    Role                Phone

 

                    Bhupinder Louise    PCP                 Unavailable







Allergies and Adverse Reactions







                    Name                Reaction            Notes

 

                    NO KNOWN DRUG ALLERGIES                         







Plan of Treatment







             Planned Activity    Comments     Planned Date    Planned Time    Plan/Goal

 

             COMPLETE CBC W/AUTO DIFF WBC                 2013     12:00 AM      

 

             ASSAY OF LITHIUM                 2013     12:00 AM      

 

             METABOLIC PANEL TOTAL CA                 2013     12:00 AM      

 

             VITAMIN B-12                 2013     12:00 AM      

 

             ASSAY OF FOLIC ACID SERUM                 2013     12:00 AM      







Medications







                                        Active 

 

             Name         Start Date    Estimated Completion Date    SIG          Comments

 

                          syringe with needle (disp) Misc.(Non-Drug; Combo Route) Syringe 1 mL 25 X 1"    2011

                                        use for injection once a month     

 

                Latuda oral tablet 20 mg                                    take 1 tablet (20 mg) by oral route once daily with

 food (at least 350 calories)            

 

             pravastatin oral tablet 40 mg    3/30/2015                 TAKE 1 TABLET BY MOUTH DAILY     

 

                lithium carbonate Oral capsule 300 mg    2015       take 1 capsule (300

 mg) by oral route 2 for 30 days         









                                         

 

             Name         Start Date    Expiration Date    SIG          Comments

 

             Reglan 10mg    3/29/2010    2010    one ac and hs     

 

                Keflex Oral Capsule 500 mg    2010       10/1/2010       take 1 capsule (500 mg) by oral

 route every 6 hours for 10 days         

 

                Bactrim DS Oral Tablet 800-160 mg    2011       take 1 tablet by oral route

 every 12 hours for 7 days               

 

                triamcinolone acetonide Topical Cream 0.1 %    2011      apply a thin

 layer to the affected area(s) by topical route 2 times per day     

 

                sertraline Oral tablet 100 mg    4/10/2012       5/10/2012       take 1.5 tablets by oral route

 daily for 30 days                       

 

                ergocalciferol (vitamin D2) Oral capsule 50,000 unit    4/15/2013       2013       TAKE

 ONE CAPSULE BY MOUTH ONCE A WEEK        

 

                CYANOCOBALAM 1000MCGINJ 1000 milliliter    2013       INJECT 1ML INTRAMUSCULAR

 ONCE A MONTH                            

 

                pravastatin Oral tablet 40 mg    3/25/2014       3/20/2015       TAKE ONE TABLET BY MOUTH EVERY

 DAY                                     

 

                          Zostavax (PF) subcutaneous suspension for reconstitution 19,400 unit/0.65 mL    3/23/2015

                    3/24/2015           inject 0.65 milliliter by subcutaneous route once     









                                        Discontinued 

 

             Name         Start Date    Discontinued Date    SIG          Comments

 

                Tylenol Oral Tablet 325 mg                    2013        take 1 - 2 tablets (325 -650 mg) by oral

 route every 4-6 hours as needed         

 

                Calcium 600 + D(3) Oral Tablet 600-400 mg-unit                    2011       take 1 tablet by oral

 route 2 times a day                    no longer taking

 

                Vitamin B-12 Oral Tablet Sustained Release 1,000 mcg    2010       take 

1 tablet by oral route daily            no longer taking

 

                Antifungal (Clotrimazole) Topical Cream 1 %    2010       apply to the 

affected and surrounding areas of skin by topical route 2 times per day morning 
and evening                              

 

                sertraline Oral Tablet 100 mg    5/10/2011       2011       take 2 tablets (200 mg) by 

oral route once daily                   discontinued by Dr. Serrano

 

                mirtazapine Oral Tablet 15 mg                    2011        take 1 tablet (15 mg) by oral route 

once daily before bedtime               Dr. Serrano

 

                mirtazapine Oral Tablet 15 mg                    2011        take 1 tablet (15 mg) by oral route 

once daily before bedtime               dc'd by Dr. Serrano

 

                Pristiq Oral Tablet Extended Release 24 hr 50 mg                    2013        take 1 tablet (50

 mg) by oral route once daily           Dr. Zach Sarahstiq Oral Tablet Extended Release 24 hr 50 mg                    2013        take 1 tablet (50

 mg) by oral route once daily           dose updated

 

                Vitamin B-12 Injection Solution 1,000 mcg/mL    2011        inject 1 milliliter

 (1,000 mcg) by intramuscular route once a month    on list already

 

                clotrimazole Topical Cream 1 %    2011        apply to the affected and surrounding

 areas of skin by topical route 2 times per day in the morning and evening     

 

                Vitamin D Oral Capsule 50,000 unit    2011        take 1 capsule (50,000 

unit) by oral route once weekly         generic on list

 

                Pravachol Oral Tablet 40 mg    2012        take 1 tablet (40 mg) by oral 

route once daily for 90 days            generic on list

 

                lithium carbonate Oral Capsule 300 mg    2012        take 1 capsule by oral

 route daily                            dose updated

 

                Pristiq Oral tablet extended release 24 hr 100 mg                    4/10/2012       take 1 and 1/2 

tablet (150 mg) by oral route once daily    Mental Health provider

 

                Pristiq Oral tablet extended release 24 hr 100 mg                    4/10/2012       take 1 and 1/2 

tablet (150 mg) by oral route once daily    Discontinued by Dr Efrain Knight at [facility]

 

                hydroxyzine HCl oral tablet 50 mg    10/16/2014      2015       take 1 tablet (50 mg) 

by oral route at bedtime                 







Problem List







                    Description         Status              Onset

 

                    Artificial opening status; colostomy    Active               

 

                    Bipolar disorder, unspecified    Active               

 

                    Hyperlipidemia      Active               

 

                    Peritoneal Neoplasm, Malignant    Active               

 

                    Anemia, Pernicious    Active               

 

                    Arthritis unspecified    Active               

 

                    B12 deficiency      Active               







Vital Signs







      Date    Time    BP-Sys(mm[Hg]    BP-Lynn(mm[Hg])    HR(bpm)    RR(rpm)    Temp    WT    HT    HC    BMI

                    BSA                 BMI Percentile      O2 Sat(%)

 

        2015    3:25:00 PM    120 mmHg    62 mmHg    72 bpm    16 rpm    98.1 F    136 lbs    69 in

                          20.08 kg/m2    1.73 m2                   98 %

 

       3/23/2015    2:55:00 PM    130 mmHg    76 mmHg    68 bpm    18 rpm    97 F    140 lbs    69 in    

                20.6742 kg/m    1.7583 m                      98 %

 

        10/16/2014    11:11:00 AM    120 mmHg    66 mmHg    77 bpm    20 rpm    98 F    130 lbs    69 in

                          19.20 kg/m2    1.69 m2                   100 %

 

        2014    3:21:00 PM    130 mmHg    66 mmHg    63 bpm    18 rpm    97.2 F    160 lbs    69 in

                          23.6276 kg/m    1.8797 m                 99 %

 

        2013    10:35:00 AM    132 mmHg    70 mmHg    66 bpm    20 rpm    98.1 F    157 lbs    69 in

                          23.18 kg/m2    1.86 m2                    

 

        2013    1:29:00 PM    132 mmHg    70 mmHg    76 bpm    18 rpm    98.2 F    166 lbs    69 in 

                          24.5137 kg/m    1.9146 m                  

 

       2013    2:46:00 PM    128 mmHg    70 mmHg    76 bpm    16 rpm    98 F    160 lbs    69 in     

                23.63 kg/m2     1.88 m2                          

 

        2011    8:49:00 AM    128 mmHg    78 mmHg    70 bpm    18 rpm    97.9 F    164 lbs    69 in

                          24.2183 kg/m    1.903 m                  

 

     2011    1:31:00 PM    132 mmHg    68 mmHg    84 bpm         97 F    167 lbs                        

                                         

 

        2011    9:09:00 AM    128 mmHg    70 mmHg    72 bpm    18 rpm    98.2 F    163 lbs    64 in 

                          27.9786 kg/m    1.8272 m                  

 

       2011    10:01:00 AM    132 mmHg    70 mmHg    72 bpm    18 rpm    98.2 F    154 lbs             

                                                                 

 

       2011    2:47:00 PM    128 mmHg    70 mmHg    72 bpm    18 rpm    97.8 F    156 lbs             

                                                                 

 

       5/10/2011    3:16:00 PM    144 mmHg    80 mmHg    72 bpm    18 rpm    98.2 F    158 lbs             

                                                                 

 

        2011    10:11:00 AM    132 mmHg    70 mmHg    70 bpm    18 rpm    98.2 F    168 lbs    69 in

                          24.809 kg/m    1.9261 m                  

 

        4/15/2011    10:52:00 AM    110 mmHg    60 mmHg    75 bpm    16 rpm    97.5 F    172.375 lbs    

69 in                     25.46 kg/m2    1.95 m2                   100 %

 

        2011    11:43:00 AM    120 mmHg    82 mmHg    75 bpm    16 rpm    97.2 F    178.5 lbs    69

 in                       26.3596 kg/m    1.9854 m                 100 %

 

        10/15/2010    1:32:00 PM    120 mmHg    70 mmHg    80 bpm    18 rpm    96.6 F    177 lbs    69 in

                          26.14 kg/m2    1.98 m2                   100 %

 

        2010    3:50:00 PM    168 mmHg    100 mmHg    82 bpm    18 rpm    97.8 F    177.5 lbs    69

 in                       26.2119 kg/m    1.9798 m                 97 %

 

        2010    1:21:00 PM    140 mmHg    80 mmHg    59 bpm    16 rpm    97.6 F    173.25 lbs    69 

in                        25.58 kg/m2    1.96 m2                   100 %

 

        2010    3:02:00 PM    140 mmHg    80 mmHg    61 bpm    16 rpm    97.6 F    173.125 lbs    69

 in                       25.5658 kg/m    1.9553 m                 99 %

 

        2010    1:23:00 PM    130 mmHg    80 mmHg    66 bpm    16 rpm    96.8 F    173 lbs    69 in 

                          25.55 kg/m2    1.95 m2                   100 %

 

        2010    12:58:00 PM    130 mmHg    88 mmHg    75 bpm    16 rpm    98.4 F    172.25 lbs    69

 in                       25.4366 kg/m    1.9503 m                 100 %







Social History







                    Name                Description         Comments

 

                    denies alcohol use                         

 

                    denies smoking                           

 

                    Denies illicit substance abuse                         

 

                    retired                                 direct care

 

                    Single                                   

 

                    Exercises regularly                         

 

                    Attended some college                         







History of Procedures







                    Date Ordered        Description         Order Status

 

                    2010 12:00 AM    COMPREHEN METABOLIC PANEL    Reviewed

 

                    2010 12:00 AM    COMPLETE CBC W/AUTO DIFF WBC    Reviewed

 

                    2010 12:00 AM    LIPID PANEL         Reviewed

 

                    2011 12:00 AM    MAMMOGRAM SCREENING    Reviewed

 

                    2011 12:00 AM    CYTOPATH C/V THIN LAYER    Reviewed

 

                    2011 12:00 AM    THER/PROPH/DIAG INJ SC/IM    Reviewed

 

                    10/20/2011 12:00 AM    THER/PROPH/DIAG INJ SC/IM    Reviewed

 

                    2011 12:00 AM    THER/PROPH/DIAG INJ SC/IM    Reviewed

 

                    2012 12:00 AM    THER/PROPH/DIAG INJ SC/IM    Reviewed

 

                    2012 12:00 AM    THER/PROPH/DIAG INJ SC/IM    Reviewed

 

                    5/3/2012 12:00 AM    THER/PROPH/DIAG INJ SC/IM    Reviewed

 

                    2012 12:00 AM    IMMUNOTHERAPY INJECTIONS    Reviewed

 

                    2012 12:00 AM    THER/PROPH/DIAG INJ SC/IM    Reviewed

 

                    2012 12:00 AM    THER/PROPH/DIAG INJ SC/IM    Reviewed

 

                    2012 12:00 AM    THER/PROPH/DIAG INJ SC/IM    Reviewed

 

                    10/16/2012 12:00 AM    THER/PROPH/DIAG INJ SC/IM    Reviewed

 

                    2010 12:00 AM    COMPREHEN METABOLIC PANEL    Reviewed

 

                    2010 12:00 AM    COMPLETE CBC W/AUTO DIFF WBC    Reviewed

 

                    2010 12:00 AM    LIPID PANEL         Reviewed

 

                    2013 12:00 AM    COMPLETE CBC W/AUTO DIFF WBC    Reviewed

 

                    2013 12:00 AM    ASSAY OF LITHIUM    Reviewed

 

                    2013 12:00 AM    METABOLIC PANEL TOTAL CA    Reviewed

 

                    4/3/2013 12:00 AM    THER/PROPH/DIAG INJ SC/IM    Reviewed

 

                    2013 12:00 AM    THER/PROPH/DIAG INJ SC/IM    Reviewed

 

                    2013 12:00 AM    THER/PROPH/DIAG INJ SC/IM    Reviewed

 

                    2013 12:00 AM    LIPID PANEL         Reviewed

 

                    2013 12:00 AM    VITAMIN D 25 HYDROXY    Reviewed

 

                    2013 12:00 AM    THER/PROPH/DIAG INJ SC/IM    Reviewed

 

                    3/6/2014 12:00 AM    THER/PROPH/DIAG INJ SC/IM    Reviewed

 

                    2014 12:00 AM    THER/PROPH/DIAG INJ SC/IM    Reviewed

 

                    2010 12:00 AM    SKIN FUNGI CULTURE    Reviewed

 

                    10/9/2010 12:00 AM    COMPREHEN METABOLIC PANEL    Reviewed

 

                    10/9/2010 12:00 AM    LIPID PANEL         Reviewed

 

                    2010 12:00 AM    THER/PROPH/DIAG INJ SC/IM    Reviewed

 

                    2010 12:00 AM    THER/PROPH/DIAG INJ SC/IM    Reviewed

 

                    10/15/2010 12:00 AM    FLU VACCINE 3 YRS & > IM    Reviewed

 

                    1/15/2011 12:00 AM    COMPLETE CBC W/AUTO DIFF WBC    Reviewed

 

                    1/15/2011 12:00 AM    COMPREHEN METABOLIC PANEL    Reviewed

 

                    1/15/2011 12:00 AM    LIPID PANEL         Reviewed

 

                    2014 12:00 AM    MAMMOGRAM SCREENING    Reviewed

 

                    7/10/2014 12:00 AM    THER/PROPH/DIAG INJ SC/IM    Reviewed

 

                    2011 12:00 AM    COMPLETE CBC W/AUTO DIFF WBC    Reviewed

 

                    2011 12:00 AM    COMPREHEN METABOLIC PANEL    Reviewed

 

                    2011 12:00 AM    LIPID PANEL         Reviewed

 

                    10/19/2014 12:00 AM    MAMMOGRAM SCREENING    Reviewed

 

                    10/16/2014 12:00 AM    COMPLETE CBC W/AUTO DIFF WBC    Reviewed

 

                    10/16/2014 12:00 AM    COMPREHEN METABOLIC PANEL    Reviewed

 

                    10/16/2014 12:00 AM    IMMUNOASSAY TUMOR     Reviewed

 

                    10/16/2014 12:00 AM    LIPID PANEL         Reviewed

 

                    10/16/2014 12:00 AM    ASSAY OF LITHIUM    Reviewed

 

                    10/16/2014 12:00 AM    MAMMOGRAM SCREENING    Reviewed

 

                    2011 12:00 AM    ASSAY OF PARATHORMONE    Reviewed

 

                    2011 12:00 AM    VITAMIN D 25 HYDROXY    Reviewed

 

                    2011 12:00 AM    ASSAY OF LITHIUM    Reviewed

 

                    2011 12:00 AM    METABOLIC PANEL TOTAL CA    Reviewed

 

                    2011 12:00 AM    CT HEAD/BRAIN W/O & W/DYE    Reviewed

 

                    3/23/2015 12:00 AM    PNEUMOCOCCAL VACC 13 GLENDY IM    Reviewed

 

                    2011 12:00 AM    ASSAY OF LITHIUM    Reviewed

 

                    2015 12:00 AM    THER/PROPH/DIAG INJ SC/IM    Reviewed

 

                    2015 12:00 AM    COMPLETE CBC W/AUTO DIFF WBC    Reviewed

 

                    2015 12:00 AM    COMPREHEN METABOLIC PANEL    Reviewed

 

                    2015 12:00 AM    LIPID PANEL         Reviewed

 

                    2015 12:00 AM    ASSAY OF LITHIUM    Reviewed

 

                    2011 12:00 AM    VIT D 1 25-DIHYDROXY    Reviewed

 

                    2011 12:00 AM    VITAMIN B-12        Reviewed

 

                    2011 12:00 AM    THER/PROPH/DIAG INJ SC/IM    Reviewed







Results Summary







                          Data and Description      Results

 

                          2004 12:00 AM        Colonoscopy-Women and Men over 50 Normal 

 

                          2008 12:00 AM         Pap Smear Declined 

 

                          10/7/2009 12:00 AM        Cholest Cry Stone Ql .0 %LDLc SerPl-mCnc 123.0 mg/dLHDLc

 SerPl-mCnc 34.0 mg/dLTrigl SerPl-mCnc 190.0 mg/dLGlucose SerPl-mCnc 78.0 mg/dL

 

                          2009 12:00 AM        Mammogram -Women over 40 Normal HIV1+2 Ab Ser Ql no risk 

 

                          2010 8:47 AM         Dexa Bone Scan Refused Aspirin reccommended Contraindication 



 

                          2010 8:48 AM         Depression Done 

 

                          2010 12:00 AM         Foot Exam-Diabetic Done 

 

                          2010 12:00 AM         Cholest Cry Stone Ql .0 %LDLc SerPl-mCnc 126.0 mg/dLGlucose

 SerPl-mCnc 102.0 mg/dL

 

                          2010 8:45 AM          TRIGLYCERIDES 122.0 mg/dLCHOLESTEROL 186.0 mg/dLHDL 36.0 mg/dLLDL

 (CALC) 126.0 mg/dLGLUCOSE 102.0 mg/dLSODIUM 143.0 mmol/LPOTASSIUM 3.70 
mmol/LCHLORIDE 111.0 mmol/LCO2 23.0 mmol/LBUN 10.0 mg/dLCREATININE 0.80 
mg/dLSGOT/AST 12.0 IU/LSGPT/ALT 11.0 IU/LALK PHOS 65.0 IU/LTOTAL PROTEIN 7.20 
g/dLALBUMIN 3.90 g/dLTOTAL BILI 0.50 mg/dLCALCIUM 10.20 mg/dLeGFR >60 
mL/min/1.73 m2WBC 5.7 RBC 3.26 HGB 10.60 g/dLHCT 31.70 %MCV 97.0 fLMCH 32.50 
pgMCHC 33.40 g/dLRDW CV 13.30 %MPV 9.70 fLPLT 287 %NEUT 62.90 %%LYMP 21.80 
%%MONO 9.90 %%EOS 5.0 %%BASO 0.40 %#NEUT 3.56 #LYMP 1.23 #MONO 0.56 #EOS 0.28 
#BASO 0.02 

 

                          2010 12:00 AM        Glucose SerPl-mCnc 96.0 mg/dLCholest Cry Stone Ql .0 %LDLc

 SerPl-mCnc 146.0 mg/dL

 

                          2010 8:26 AM         TRIGLYCERIDES 106.0 mg/dLCHOLESTEROL 199.0 mg/dLHDL 32.0 mg/dLLDL

 (CALC) 146.0 mg/dLGLUCOSE 96.0 mg/dLSODIUM 143.0 mmol/LPOTASSIUM 4.0 
mmol/LCHLORIDE 113.0 mmol/LCO2 24.0 mmol/LBUN 13.0 mg/dLCREATININE 1.0 
mg/dLSGOT/AST 11.0 IU/LSGPT/ALT 6.0 IU/LALK PHOS 56.0 IU/LTOTAL PROTEIN 6.60 
g/dLALBUMIN 3.80 g/dLTOTAL BILI 0.50 mg/dLCALCIUM 9.30 mg/dLeGFR 57 

 

                          10/6/2010 12:00 AM        Cholest Cry Stone Ql .0 %LDLc SerPl-mCnc 111.0 mg/dLGlucose

 SerPl-mCnc 81.0 mg/dL

 

                          10/6/2010 2:45 PM         TRIGLYCERIDES 123.0 mg/dLCHOLESTEROL 178.0 mg/dLHDL 42.0 mg/dLLDL

 (CALC) 111.0 mg/dLGLUCOSE 81.0 mg/dLSODIUM 139.0 mmol/LPOTASSIUM 4.10 
mmol/LCHLORIDE 106.0 mmol/LCO2 24.0 mmol/LBUN 13.0 mg/dLCREATININE 0.90 
mg/dLSGOT/AST 13.0 IU/LSGPT/ALT 11.0 IU/LALK PHOS 61.0 IU/LTOTAL PROTEIN 7.10 
g/dLALBUMIN 3.90 g/dLTOTAL BILI 0.30 mg/dLCALCIUM 9.30 mg/dLeGFR >60 mL/min/1.73
 m2WBC 6.9 RBC 3.59 HGB 11.50 g/dLHCT 35.30 %MCV 98.0 fLMCH 32.0 pgMCHC 32.60 
g/dLRDW CV 12.90 %MPV 9.90 fLPLT 311 %NEUT 64.90 %%LYMP 22.50 %%MONO 7.20 %%EOS 
5.10 %%BASO 0.30 %#NEUT 4.45 #LYMP 1.54 #MONO 0.49 #EOS 0.35 #BASO 0.02 

 

                          2011 12:00 AM         Mammogram -Women over 40 Ordered 

 

                          2011 10:25 AM        TRIGLYCERIDES 111.0 mg/dLCHOLESTEROL 195.0 mg/dLHDL 43.0 mg/dLLDL

 (CALC) 130.0 mg/dLWBC 5.3 RBC 3.76 HGB 12.0 g/dLHCT 37.80 %.0 fLMCH 
31.90 pgMCHC 31.70 g/dLRDW CV 13.0 %MPV 9.70 fLPLT 259 %NEUT 69.0 %%LYMP 17.60 
%%MONO 8.30 %%EOS 4.70 %%BASO 0.40 %#NEUT 3.63 #LYMP 0.93 #MONO 0.44 #EOS 0.25 
#BASO 0.02 GLUCOSE 102.0 mg/dLSODIUM 146.0 mmol/LPOTASSIUM 4.20 mmol/LCHLORIDE 
113.0 mmol/LCO2 23.0 mmol/LBUN 15.0 mg/dLCREATININE 1.0 mg/dLSGOT/AST 12.0 
IU/LSGPT/ALT 17.0 IU/LALK PHOS 60.0 IU/LTOTAL PROTEIN 6.90 g/dLALBUMIN 4.20 
g/dLTOTAL BILI 0.40 mg/dLCALCIUM 9.70 mg/dLeGFR 57 

 

                          2011 11:49 AM        Cholest Cry Stone Ql .0 %LDLc SerPl-mCnc 130.0 mg/dLHDLc

 SerPl-mCnc 43.0 mg/dLTrigl SerPl-mCnc 111.0 mg/dLGlucose SerPl-mCnc 102.0 mg/dL

 

                          2011 11:52 AM        Pap Smear Declined 

 

                          2011 11:28 AM        Lithium 2.080 mmol/LGLUCOSE 102.0 mg/dLSODIUM 135.0 mmol/LPOTASSIUM

 3.90 mmol/LCHLORIDE 106.0 mmol/LCO2 21.0 mmol/LBUN 12.0 mg/dLCREATININE 1.30 
mg/dLCALCIUM 10.70 mg/dLeGFR 42 

 

                          2011 8:58 AM          Lithium 0.690 mmol/L

 

                          2011 2:38 PM         VITAMIN B12 3483.0 pg/mL

 

                          2013 3:35 PM          WBC 5.1 RBC 3.73 HGB 11.70 g/dLHCT 36.40 %MCV 98.0 fLMCH 31.40

 pgMCHC 32.10 g/dLRDW CV 13.10 %MPV 9.80 fLPLT 224 %NEUT 66.80 %%LYMP 19.10 
%%MONO 9.0 %%EOS 4.90 %%BASO 0.20 %#NEUT 3.42 #LYMP 0.98 #MONO 0.46 #EOS 0.25 
#BASO 0.01 GLUCOSE 88.0 mg/dLSODIUM 141.0 mmol/LPOTASSIUM 4.10 mmol/LCHLORIDE 
110.0 mmol/LCO2 22.0 mmol/LBUN 22.0 mg/dLCREATININE 1.10 mg/dLCALCIUM 9.80 
mg/dLeGFR 50 Lithium 0.760 mmol/L

 

                          2013 11:02 AM        TRIGLYCERIDES 106.0 mg/dLCHOLESTEROL 181.0 mg/dLHDL 46.0 mg/dLLDL

 (CALC) 114.0 mg/dLVITAMIN D 41.10 ng/mL

 

                          10/17/2014 10:10 AM       WBC 5.0 RBC 3.66 HGB 11.60 g/dLHCT 36.80 %.0 fLMCH 31.70

 pgMCHC 31.50 g/dLRDW CV 13.50 %MPV 10.10 fLPLT 209 %NEUT 69.20 %%LYMP 21.0 
%%MONO 6.40 %%EOS 3.20 %%BASO 0.20 %#NEUT 3.46 #LYMP 1.05 #MONO 0.32 #EOS 0.16 
#BASO 0.01 GLUCOSE 100.0 mg/dLSODIUM 148.0 mmol/LPOTASSIUM 3.90 mmol/LCHLORIDE 
114.0 mmol/LCO2 26.0 mmol/LBUN 12.0 mg/dLCREATININE 1.20 mg/dLSGOT/AST 9.0 
IU/LSGPT/ALT <6 IU/LALK PHOS 82.0 IU/LTOTAL PROTEIN 6.90 g/dLALBUMIN 4.0 
g/dLTOTAL BILI 0.40 mg/dLCALCIUM 10.50 mg/dLeGFR 45 TRIGLYCERIDES 96.0 
mg/dLCHOLESTEROL 155.0 mg/dLHDL 38.0 mg/dLLDL (CALC) 98.0 mg/dLLithium 0.850 
mmol/LCancer Antigen (CA) 125 8.30 U/mL

 

                          2015 10:25 AM        Lithium 0.790 mmol/LWBC 4.8 RBC 3.44 HGB 11.0 g/dLHCT 35.20 

%.0 fLMCH 32.0 pgMCHC 31.30 g/dLRDW CV 14.0 %MPV 9.30 fLPLT 210 %NEUT 
70.80 %%LYMP 17.20 %%MONO 8.10 %%EOS 3.50 %%BASO 0.40 %#NEUT 3.41 #LYMP 0.83 
#MONO 0.39 #EOS 0.17 #BASO 0.02 TRIGLYCERIDES 107.0 mg/dLCHOLESTEROL 174.0 
mg/dLHDL 43.0 mg/dLLDL (CALC) 110.0 mg/dLGLUCOSE 90.0 mg/dLSODIUM 145.0 
mmol/LPOTASSIUM 3.80 mmol/LCHLORIDE 115.0 mmol/LCO2 24.0 mmol/LBUN 17.0 mg
/dLCREATININE 1.30 mg/dLSGOT/AST 18.0 IU/LSGPT/ALT 17.0 IU/LALK PHOS 56.0 
IU/LTOTAL PROTEIN 6.70 g/dLALBUMIN 3.90 g/dLTOTAL BILI 0.40 mg/dLCALCIUM 9.80 
mg/dLeGFR 41 







History Of Immunizations







       Name    Date Admin    Mfg Name    Mfg Code    Trade Name    Lot#    Route    Inj    Vis Given    Vis

 Pub                                    CVX

 

        Influenza    2008    Not Entered    NE      Not Entered            Not Entered    Not Entered

                    1            999

 

          Pneumococcal    2008    [employer]    MSD       Pneumovax 23              Intramuscular

                Not Entered     1        999

 

           Influenza    10/15/2010    [employer]    NOV        Fluvirin > 12 Years    

[id number]     Intramuscular    Left Deltoid    10/15/2010    2009    999

 

          Pneumococcal    3/23/2015    [facility]    [facility]       Prevnar 13    [id number]    Intramuscular

                Right Gluteous Medius    3/23/2015       2013       109







History of Past Illness







                    Name                Date of Onset       Comments

 

                    Peritoneal Neoplasm, Malignant                         

 

                    Hyperlipidemia                           

 

                    Bipolar disorder, unspecified                         

 

                    Artificial opening status; colostomy                         

 

                    B12 deficiency                           

 

                    Anemia, Pernicious                         

 

                    Arthritis unspecified                         

 

                    cervical cancer                          

 

                    Artificial opening status; colostomy    2010  1:10PM     

 

                    Bipolar disorder, unspecified    2010  1:10PM     

 

                    Hyperlipidemia      2010  1:10PM     

 

                    Anemia, Pernicious    2010  1:10PM     

 

                    Postoperative Follow-Up    2010  1:55PM     

 

                    Postoperative Follow-Up    Mar  8 2010 10:57AM     

 

                    Artificial opening status; colostomy    Mar  8 2010  1:19PM     

 

                    Peritoneal Neoplasm, Malignant    Mar  8 2010  1:19PM     

 

                    Artificial opening status; colostomy    2010  1:40PM     

 

                    Hyperlipidemia      2010  1:40PM     

 

                    Anemia, Pernicious    2010  1:40PM     

 

                    Peritoneal Neoplasm, Malignant    2010  1:40PM     

 

                    Arthritis unspecified    2010  1:40PM     

 

                    Anemia of Chronic Illness    2010  1:40PM     

 

                    Tinea corporis      2010  3:17PM     

 

                    Bipolar disorder, unspecified    2010  1:33PM     

 

                    Hyperlipidemia      2010  1:33PM     

 

                    Anemia, Pernicious    2010  1:33PM     

 

                    Peritoneal Neoplasm, Malignant    2010  1:33PM     

 

                    B12 deficiency      2010  1:33PM     

 

                    Ethmoidal Sinusitis, Acute    Sep 21 2010  3:53PM     

 

                    Wheezing            Sep 21 2010  3:53PM     

 

                    Flu                 Oct 15 2010  1:40PM     

 

                    Bipolar disorder, unspecified    Oct 15 2010  1:42PM     

 

                    Hyperlipidemia      Oct 15 2010  1:42PM     

 

                    Anemia, Pernicious    Oct 15 2010  1:42PM     

 

                    Peritoneal Neoplasm, Malignant    Oct 15 2010  1:42PM     

 

                    Bipolar disorder, unspecified    2011 12:01PM     

 

                    Hyperlipidemia      2011 12:01PM     

 

                    Anemia, Pernicious    2011 12:01PM     

 

                    Peritoneal Neoplasm, Malignant    2011 12:01PM     

 

                    Bipolar disorder, unspecified    Apr 15 2011 10:55AM     

 

                    Major Depression    2011 10:11AM     

 

                    Bipolar Disorder    2011 10:11AM     

 

                    Cancer              May 10 2011  4:16PM     

 

                    Major Depression    May 10 2011  3:16PM     

 

                    Bipolar Disorder    May 10 2011  3:16PM     

 

                    Hypercalcemia       May 23 2011  2:47PM     

 

                    Bipolar disorder, unspecified    May 23 2011  2:47PM     

 

                    Colon Cancer, Personal History    May 23 2011  2:47PM     

 

                    Bipolar Disorder    May 31 2011  4:39PM     

 

                    Depressive Disorder    2011 10:01AM     

 

                    Vitamin B12 deficiency    2011 10:01AM     

 

                    Vitamin D Deficiency    2011  5:07PM     

 

                    Anemia, Vitamin B12 Deficiency    2011  5:07PM     

 

                    B12 deficiency      2011  3:56PM     

 

                    Routine gynecological examination    Aug  4 2011  9:08AM     

 

                    Screening Examination for Breast Cancer    Aug  4 2011  9:08AM     

 

                    Tinea Corporis      Aug  4 2011  9:08AM     

 

                    Depressive Disorder    Sep 23 2011  8:47AM     

 

                    Contact Dermatitis    Sep 23 2011  8:47AM     

 

                    Anemia, Pernicious    Sep 23 2011  8:47AM     

 

                    B12 deficiency      Sep 23 2011  8:47AM     

 

                    B12 deficiency      Sep 27 2011  2:58PM     

 

                    B12 deficiency      Oct 20 2011  2:34PM     

 

                    Flu                 Dec  9 2011  3:16PM     

 

                    B12 deficiency      Dec  9 2011  3:17PM     

 

                    B12 deficiency      2012  4:52PM     

 

                    B12 deficiency      2012 11:10AM     

 

                    B12 deficiency      2012  3:37PM     

 

                    B12 deficiency      May  3 2012  4:10PM     

 

                    B12 deficiency      2012  2:54PM     

 

                    B12 deficiency      2012 11:23AM     

 

                    B12 deficiency      Aug  9 2012  2:08PM     

 

                    B12 deficiency      Sep  6 2012  4:36PM     

 

                    B12 deficiency      Oct 16 2012 10:23AM     

 

                    Flu                 2013  3:11PM     

 

                    Bipolar disorder, unspecified    2013  2:48PM     

 

                    Anemia, Pernicious    2013  2:48PM     

 

                    B12 deficiency      2013  2:48PM     

 

                    Extrapyramidal abnormal movement disorder    2013  2:48PM     

 

                    B12 deficiency      Apr  3 2013 12:03PM     

 

                    Bipolar disorder, unspecified    May  7 2013  1:31PM     

 

                    Anemia, Pernicious    May  7 2013  1:31PM     

 

                    B12 deficiency      May  7 2013  1:31PM     

 

                    Extrapyramidal abnormal movement disorder    May  7 2013  1:31PM     

 

                    B12 deficiency      2013  3:42PM     

 

                    B12 deficiency      2013  1:31PM     

 

                    Hyperlipidemia      Aug  7 2013 10:37AM     

 

                    Vitamin D Deficiency    Aug  7 2013 10:37AM     

 

                    Bipolar disorder, unspecified    Aug  7 2013 10:37AM     

 

                    Anemia, Pernicious    Aug  7 2013 10:37AM     

 

                    B12 deficiency      Aug  7 2013 10:37AM     

 

                    B12 deficiency      Sep 25 2013 11:15AM     

 

                    B12 deficiency      Dec 11 2013  3:16PM     

 

                    B12 deficiency      Mar  6 2014  1:48PM     

 

                    B12 deficiency      May 21 2014  3:17PM     

 

                    Screening Examination for Breast Cancer    2014  3:23PM     

 

                    Periumbilical abdominal pain    2014  3:23PM     

 

                    B12 deficiency      Jul 10 2014  2:52PM     

 

                    Anemia, Vitamin B12 Deficiency    Aug 13 2014  4:50PM     

 

                    Bipolar disorder    Oct 16 2014 11:13AM     

 

                    Hyperlipidemia      Oct 16 2014 11:13AM     

 

                    Anemia, Pernicious    Oct 16 2014 11:13AM     

 

                    Peritoneal Neoplasm, Malignant    Oct 16 2014 11:13AM     

 

                    Screening breast examination    Oct 16 2014 11:13AM     

 

                    Weight loss         Oct 16 2014 11:13AM     

 

                    Anemia, Pernicious    Mar 23 2015  2:57PM     

 

                    B12 deficiency      Mar 23 2015  2:57PM     

 

                    Need for Prevnar vaccine    Mar 23 2015  2:57PM     

 

                    Bipolar disorder    Mar 23 2015  2:57PM     

 

                    Hyperlipidemia      Mar 23 2015  2:57PM     

 

                    Anemia, Pernicious    Mar 23 2015  2:57PM     

 

                    Peritoneal Neoplasm, Malignant    Mar 23 2015  2:57PM     

 

                    B12 deficiency      May  4 2015  4:48PM     

 

                    Hyperlipidemia      May 13 2015  9:56AM     

 

                    Anemia              May 13 2015  9:56AM     

 

                    Bipolar disorder    May 13 2015  9:56AM     

 

                    Bipolar disorder    May 14 2015  3:27PM     

 

                    Hyperlipidemia      May 14 2015  3:27PM     

 

                    Anemia, Pernicious    May 14 2015  3:27PM     

 

                    Peritoneal Neoplasm, Malignant    May 14 2015  3:27PM     







Payers







           Insurance Name    Company Name    Plan Name    Plan Number    Policy Number    Policy Group

 Number                                 Start Date

 

                    Medicare Part A    Medicare Part A              [id number]              N/A

 

                    [facility]              [id number]              N/A

 

                    Medicare Part B    [employer] [place]              [id number]              2006

 

                          [facility] Financial Assistance    [facility]                 50 percent

                                                    2009







History of Encounters







                    Visit Date          Visit Type          Provider

 

                    2015           Office visit        Bhupinder Aspen DO

 

                    2015            Nurse visit         Bhupinder Aspen DO

 

                    3/23/2015           Office visit        Bhupinder Aspen DO

 

                    10/16/2014          Office visit        Bhupinder Aspen DO

 

                    2014           Nurse visit         Radha Ontiveros APRN

 

                    7/10/2014           Nurse visit         Bhupinder Aspen DO

 

                    2014           Office visit        Bhupinder Aspen DO

 

                    2014           Nurse visit         Bhupinder Aspen DO

 

                    3/6/2014            Nurse visit         Bhupinder Aspen DO

 

                    2014            [facility]            EARNEST Lopez MD

 

                    2013          Nurse visit         Bhupinder Aspen DO

 

                    2013           Nurse visit         Bhupinder Aspen DO

 

                    2013            Office visit        Bhupinder Aspen DO

 

                    2013            Nurse visit         Bhupinder Aspen DO

 

                    2013            Nurse visit         Bhupinder Aspen DO

 

                    2013            Office visit        Bhupinder Aspen DO

 

                    4/3/2013            Nurse visit         Bhupinder Aspen DO

 

                    2013            Office visit        Bhupinder Aspen DO

 

                    10/16/2012          Nurse visit         Bhupinder Aspen DO

 

                    2012            Voided              Bhupinder Aspen DO

 

                    2012            Nurse visit         Bhupinder Aspen DO

 

                    2012            Nurse visit         Bhupinder Aspen DO

 

                    2012            Nurse visit         Bhupinder Aspen DO

 

                    2012           Nurse visit         Bhupinder Aspen DO

 

                    5/3/2012            Nurse visit         Bhupinder Aspen DO

 

                    2012           Nurse visit         Bhupinder Aspen DO

 

                    2012           Nurse visit         Bhupinder Aspen DO

 

                    2012           Nurse visit         Bhupinder Aspen DO

 

                    2011           Nurse visit         Bhupinder Aspen DO

 

                    10/20/2011          Nurse visit         Bhupinder Aspen DO

 

                    2011           Office visit        Bhupinder Aspen DO

 

                    2011           Nurse visit         Radha Ontiveros APRN

 

                    2011            Office visit        Bhupinder Aspen DO

 

                    2011           Nurse visit         Bhupinder Aspen DO

 

                    2011            Office visit        Bhupinder Aspen DO

 

                    2011           Office visit        Bhupinder Aspen DO

 

                    5/10/2011           Office visit        Bhupinder Louise DO

 

                    2011           Office visit        Bhupinder Louise DO

 

                    4/15/2011           Office visit        Devin Angel DO

 

                    2011           Office visit        Devin Angel DO

 

                    10/15/2010          Office visit        Devin Angel DO

 

                    2010           Office visit        Devin Angel DO

 

                    2010            Office visit        Devin Angel DO

 

                    2010           Office visit        Devin Angel DO

 

                    2010            Office visit        Devin Angel DO

 

                    3/8/2010            Office visit        Devin Masterson MD

 

                    2010            Office visit        Devin Angel DO

 

                    2010            Surgery             Devin Masterson MD

 

                    2010           Surgery             Devin Masterson MD

 

                    2010           Hospital            Devin Masterson MD

 

                    2010           Hospital            Devin Masterson MD

 

                    10/22/2009          Office visit        Devin Angel DO

## 2019-06-26 NOTE — XMS REPORT
MU2 Ambulatory Summary

                             Created on: 2016



Pauline Gan

External Reference #: [id number]

: 1950

Sex: Female



Demographics







                          Address                   [address]

[place]  [id number]

 

                          Home Phone                [phone]

 

                          Preferred Language        English

 

                          Marital Status            Legally 

 

                          Catholic Affiliation     Unknown

 

                          Race                      White

 

                          Ethnic Group              Not  or 





Author







                          Author                    Bhupinder Louise

 

                          [facility]

 

                          Address                   [address]

[place]  [phone]



 

                          Phone                     [phone]







Care Team Providers







                    Care Team Member Name    Role                Phone

 

                    Bhupinder Louise    PCP                 Unavailable







Allergies and Adverse Reactions







                    Name                Reaction            Notes

 

                    NO KNOWN DRUG ALLERGIES                         







Plan of Treatment







             Planned Activity    Comments     Planned Date    Planned Time    Plan/Goal

 

             THER/PROPH/DIAG INJ SC/IM                 2016    12:00 AM      

 

             COMPLETE CBC W/AUTO DIFF WBC                 2013     12:00 AM      

 

             ASSAY OF LITHIUM                 2013     12:00 AM      

 

             METABOLIC PANEL TOTAL CA                 2013     12:00 AM      

 

             VITAMIN B-12                 2013     12:00 AM      

 

             ASSAY OF FOLIC ACID SERUM                 2013     12:00 AM      

 

             THER/PROPH/DIAG INJ SC/IM                 2013    12:00 AM      







Medications







                                        Active 

 

             Name         Start Date    Estimated Completion Date    SIG          Comments

 

                Latuda 20 mg oral tablet                                    take 1 tablet (20 mg) by oral route once daily with

 food (at least 350 calories)            

 

             pravastatin 40 mg oral tablet    3/30/2015                 TAKE 1 TABLET BY MOUTH DAILY     

 

                Namenda XR 28 mg oral capsule,sprinkle,ER 24hr    2015                       take 1 capsule (28

 mg) by oral route once daily            

 

                Namenda XR 28 mg oral capsule,sprinkle,ER 24hr    2016                       take 1 capsule (28

 mg) by oral route once daily            

 

                triamcinolone acetonide 0.1 % topical cream    2016                        apply a thin layer to 

the affected area(s) by topical route 2 times per day     

 

                furosemide 40 mg oral tablet    2016      take 1 tablet (40 mg) by oral

 route once daily                        

 

                potassium chloride 10 mEq oral tablet extended release    2016                       take 1 tablet

 (10 meq) by oral route once daily       

 

             pravastatin 40 mg oral tablet    2016                 TAKE 1 TABLET BY MOUTH DAILY     

 

                Vitamin B-12 1,000 mcg/mL injection solution    2016                       inject 1 milliliter 

(1,000 mcg) by intramuscular route once a month     









                                         

 

             Name         Start Date    Expiration Date    SIG          Comments

 

             Reglan 10mg    3/29/2010    2010    one ac and hs     

 

                Keflex 500 mg oral capsule    2010       10/1/2010       take 1 capsule (500 mg) by oral

 route every 6 hours for 10 days         

 

                Bactrim -160 mg oral tablet    2011       take 1 tablet by oral route

 every 12 hours for 7 days               

 

                triamcinolone acetonide 0.1 % topical cream    2011      apply a thin

 layer to the affected area(s) by topical route 2 times per day     

 

                sertraline 100 mg oral tablet    4/10/2012       5/10/2012       take 1.5 tablets by oral route

 daily for 30 days                       

 

                ergocalciferol (vitamin D2) 50,000 unit oral capsule    4/15/2013       2013       TAKE

 ONE CAPSULE BY MOUTH ONCE A WEEK        

 

                CYANOCOBALAM 1000MCGINJ 1000 milliliter    2013       INJECT 1ML INTRAMUSCULAR

 ONCE A MONTH                            

 

                pravastatin 40 mg oral tablet    3/25/2014       3/20/2015       TAKE ONE TABLET BY MOUTH EVERY

 DAY                                     

 

                          Zostavax (PF) 19,400 unit/0.65 mL subcutaneous suspension for reconstitution    3/23/2015

                    3/24/2015           inject 0.65 milliliter by subcutaneous route once     

 

                famciclovir 500 mg oral tablet    12/3/2015       12/10/2015      take 1 tablet (500 mg) by

 oral route every 8 hours for 7 days     

 

                Cipro 500 mg oral tablet    2016       take 1 tablet (500 mg) by oral route

 2 times per day for 5 days              









                                        Discontinued 

 

             Name         Start Date    Discontinued Date    SIG          Comments

 

                Tylenol 325 mg oral tablet                    2013        take 1 - 2 tablets (325 -650 mg) by oral

 route every 4-6 hours as needed         

 

                Calcium 600 + D(3) 600 mg(1,500mg) -400 unit oral tablet                    2011       take 1 tablet

 by oral route 2 times a day            no longer taking

 

                Vitamin B-12 1,000 mcg oral tablet extended release    2010       take 1

 tablet by oral route daily             no longer taking

 

                Antifungal (clotrimazole) 1 % topical cream    2010       apply to the 

affected and surrounding areas of skin by topical route 2 times per day morning 
and evening                              

 

                sertraline 100 mg oral tablet    5/10/2011       2011       take 2 tablets (200 mg) by 

oral route once daily                   discontinued by Dr. Serrano

 

                mirtazapine 15 mg oral tablet                    2011        take 1 tablet (15 mg) by oral route 

once daily before bedtime               Dr. Serrano

 

                mirtazapine 15 mg oral tablet                    2011        take 1 tablet (15 mg) by oral route 

once daily before bedtime               dc'd by Dr. Serrano

 

                Pristiq 50 mg oral tablet extended release 24 hr                    2013        take 1 tablet (50

 mg) by oral route once daily           Dr. Serrano

 

                Pristiq 50 mg oral tablet extended release 24 hr                    2013        take 1 tablet (50

 mg) by oral route once daily           dose updated

 

                Vitamin B-12 1,000 mcg/mL injection solution    2011        inject 1 milliliter

 (1,000 mcg) by intramuscular route once a month    on list already

 

                    syringe with needle 1 mL 25 gauge x 1" miscellaneous syringe    2011

                          use for injection once a month     

 

                clotrimazole 1 % topical cream    2011        apply to the affected and surrounding

 areas of skin by topical route 2 times per day in the morning and evening     

 

                Vitamin D2 50,000 unit oral capsule    2011        take 1 capsule (50,000

 unit) by oral route once weekly        generic on list

 

                Pravachol 40 mg oral tablet    2012        take 1 tablet (40 mg) by oral 

route once daily for 90 days            generic on list

 

                lithium carbonate 300 mg oral capsule    2012        take 1 capsule by oral

 route daily                            dose updated

 

                Pristiq 100 mg oral tablet extended release 24 hr                    4/10/2012       take 1 and 1/2 

tablet (150 mg) by oral route once daily    Mental Health provider

 

                Pristiq 100 mg oral tablet extended release 24 hr                    4/10/2012       take 1 and 1/2 

tablet (150 mg) by oral route once daily    Discontinued by Dr Efrain Knight at [facility]

 

                hydroxyzine HCl 50 mg oral tablet    10/16/2014      2015       take 1 tablet (50 mg) 

by oral route at bedtime                 

 

                lithium carbonate 300 mg oral capsule    2015       take 1 capsule (300

 mg) by oral route 2 for 30 days         

 

                fluconazole 100 mg oral tablet    2015       12/3/2015       take 1 tablet (100 mg) by 

oral route once a week                   

 

                ketoconazole 2 % topical cream    2015       12/3/2015       apply to the affected area(s)

 by topical route 2 times per day        

 

                prednisone 10 mg oral tablet    12/3/2015       2016        take 2 tablets (20 mg) by oral

 route once daily for 4 days 1 tablet daily for 4 days 0.5 tablet daily for 4 
days                                     







Problem List







                    Description         Status              Onset

 

                    Artificial opening status; colostomy    Active               

 

                    Bipolar disorder, unspecified    Active               

 

                    Hyperlipidemia      Active               

 

                    Peritoneal Neoplasm, Malignant    Active               

 

                    Anemia, Pernicious    Active               

 

                    Arthritis unspecified    Active               

 

                    B12 deficiency      Active               







Vital Signs







      Date    Time    BP-Sys(mm[Hg]    BP-Lynn(mm[Hg])    HR(bpm)    RR(rpm)    Temp    WT    HT    HC    BMI

                    BSA                 BMI Percentile      O2 Sat(%)

 

       2016    3:11:00 PM    134 mmHg    76 mmHg    80 bpm    20 rpm    98 F    163 lbs    69 in     

                24.07 kg/m2     1.90 m2                         98 %

 

        2016    2:04:00 PM    142 mmHg    86 mmHg    68 bpm    16 rpm    98.5 F    166 lbs    63 in

                          29.4053 kg/m    1.8295 m                 100 %

 

        2016    11:27:00 AM    148 mmHg    78 mmHg    90 bpm    20 rpm    98.2 F    153 lbs    69 in

                          22.59 kg/m2    1.84 m2                   96 %

 

        12/3/2015    9:50:00 AM    132 mmHg    70 mmHg    62 bpm    16 rpm    97.9 F    145 lbs    69 in

                          21.4125 kg/m    1.7894 m                 100 %

 

        2015    8:52:00 AM    132 mmHg    68 mmHg    52 bpm    20 rpm    97.8 F    141 lbs    69 in

                          20.82 kg/m2    1.76 m2                   100 %

 

        2015    3:25:00 PM    120 mmHg    62 mmHg    72 bpm    16 rpm    98.1 F    136 lbs    69 in

                          20.0835 kg/m    1.733 m                 98 %

 

       3/23/2015    2:55:00 PM    130 mmHg    76 mmHg    68 bpm    18 rpm    97 F    140 lbs    69 in    

                20.67 kg/m2     1.76 m2                         98 %

 

        10/16/2014    11:11:00 AM    120 mmHg    66 mmHg    77 bpm    20 rpm    98 F    130 lbs    69 in

                          19.1974 kg/m    1.6943 m                 100 %

 

        2014    3:21:00 PM    130 mmHg    66 mmHg    63 bpm    18 rpm    97.2 F    160 lbs    69 in

                          23.63 kg/m2    1.88 m2                   99 %

 

        2013    10:35:00 AM    132 mmHg    70 mmHg    66 bpm    20 rpm    98.1 F    157 lbs    69 in

                          23.1846 kg/m    1.862 m                  

 

        2013    1:29:00 PM    132 mmHg    70 mmHg    76 bpm    18 rpm    98.2 F    166 lbs    69 in 

                          24.51 kg/m2    1.91 m2                    

 

       2013    2:46:00 PM    128 mmHg    70 mmHg    76 bpm    16 rpm    98 F    160 lbs    69 in     

                23.6276 kg/m    1.8797 m                       

 

        2011    8:49:00 AM    128 mmHg    78 mmHg    70 bpm    18 rpm    97.9 F    164 lbs    69 in

                          24.22 kg/m2    1.90 m2                    

 

     2011    1:31:00 PM    132 mmHg    68 mmHg    84 bpm         97 F    167 lbs                        

                                         

 

        2011    9:09:00 AM    128 mmHg    70 mmHg    72 bpm    18 rpm    98.2 F    163 lbs    64 in 

                          27.98 kg/m2    1.83 m2                    

 

       2011    10:01:00 AM    132 mmHg    70 mmHg    72 bpm    18 rpm    98.2 F    154 lbs             

                                                                 

 

       2011    2:47:00 PM    128 mmHg    70 mmHg    72 bpm    18 rpm    97.8 F    156 lbs             

                                                                 

 

       5/10/2011    3:16:00 PM    144 mmHg    80 mmHg    72 bpm    18 rpm    98.2 F    158 lbs             

                                                                 

 

        2011    10:11:00 AM    132 mmHg    70 mmHg    70 bpm    18 rpm    98.2 F    168 lbs    69 in

                          24.81 kg/m2    1.93 m2                    

 

        4/15/2011    10:52:00 AM    110 mmHg    60 mmHg    75 bpm    16 rpm    97.5 F    172.375 lbs    

69 in                     25.4551 kg/m    1.951 m                 100 %

 

        2011    11:43:00 AM    120 mmHg    82 mmHg    75 bpm    16 rpm    97.2 F    178.5 lbs    69

 in                       26.36 kg/m2    1.99 m2                   100 %

 

        10/15/2010    1:32:00 PM    120 mmHg    70 mmHg    80 bpm    18 rpm    96.6 F    177 lbs    69 in

                          26.1381 kg/m    1.977 m                 100 %

 

        2010    3:50:00 PM    168 mmHg    100 mmHg    82 bpm    18 rpm    97.8 F    177.5 lbs    69

 in                       26.21 kg/m2    1.98 m2                   97 %

 

        2010    1:21:00 PM    140 mmHg    80 mmHg    59 bpm    16 rpm    97.6 F    173.25 lbs    69 

in                        25.5843 kg/m    1.956 m                 100 %

 

        2010    3:02:00 PM    140 mmHg    80 mmHg    61 bpm    16 rpm    97.6 F    173.125 lbs    69

 in                       25.57 kg/m2    1.96 m2                   99 %

 

        2010    1:23:00 PM    130 mmHg    80 mmHg    66 bpm    16 rpm    96.8 F    173 lbs    69 in 

                          25.5474 kg/m    1.9546 m                 100 %

 

        2010    12:58:00 PM    130 mmHg    88 mmHg    75 bpm    16 rpm    98.4 F    172.25 lbs    69

 in                       25.44 kg/m2    1.95 m2                   100 %







Social History







                    Name                Description         Comments

 

                    denies alcohol use                         

 

                    denies smoking                           

 

                    Denies illicit substance abuse                         

 

                    retired                                 direct care

 

                    Single                                   

 

                    Exercises regularly                         

 

                    Attended some college                         

 

                    Tobacco             Never smoker         







History of Procedures







                    Date Ordered        Description         Order Status

 

                    2010 12:00 AM    COMPREHEN METABOLIC PANEL    Reviewed

 

                    2010 12:00 AM    COMPLETE CBC W/AUTO DIFF WBC    Reviewed

 

                    2010 12:00 AM    LIPID PANEL         Reviewed

 

                          2015 12:00 AM        B12 Injection, Up to 1000 Mcg NDC#4365-4155-79 [facility] 

                                        Reviewed

 

                    2011 12:00 AM    MAMMOGRAM SCREENING    Reviewed

 

                    2011 12:00 AM    CYTOPATH C/V THIN LAYER    Reviewed

 

                    2011 12:00 AM    B12 Injection 1 cc NDC#[address]-07    Reviewed

 

                    2015 12:00 AM    THER/PROPH/DIAG INJ SC/IM    Reviewed

 

                    2015 12:00 AM    B12 Injection, Up to 1000 Mcg NDC#8834-0052-48    Reviewed

 

                    2011 12:00 AM    THER/PROPH/DIAG INJ SC/IM    Reviewed

 

                    2011 12:00 AM    B12 Injection([facility]) Ndc#5655-6139-26-    Reviewed

 

                    2015 12:00 AM    THER/PROPH/DIAG INJ SC/IM    Reviewed

 

                    2015 12:00 AM    B12 Injection, Up to 1000 Mcg NDC#6603-9243-67    Reviewed

 

                    10/20/2011 12:00 AM    THER/PROPH/DIAG INJ SC/IM    Reviewed

 

                    10/20/2011 12:00 AM    B12 Injection([facility]) Ndc#1976-9859-20-    Reviewed

 

                    2016 12:00 AM    THER/PROPH/DIAG INJ SC/IM    Reviewed

 

                    2016 12:00 AM    B12 Injection, Up to 1000 Mcg NDC#1470-2702-55    Reviewed

 

                    3/14/2016 12:00 AM    VITAMIN B-12        Reviewed

 

                    3/15/2016 12:00 AM    THER/PROPH/DIAG INJ SC/IM    Reviewed

 

                    3/15/2016 12:00 AM    B12 Injection, Up to 1000 Mcg NDC#6636-0830-47    Reviewed

 

                    2011 12:00 AM    ***Immunization administration, Medicare flu    Reviewed

 

                    2011 12:00 AM    Fluzone ** MEDICARE Only **    Reviewed

 

                    2011 12:00 AM    THER/PROPH/DIAG INJ SC/IM    Reviewed

 

                    2011 12:00 AM    B12 Injection ([facility]) Ndc#1746-2047-14    Reviewed

 

                    2016 12:00 AM    B12 Injection, Up to 1000 Mcg NDC#1835-8099-32 [facility]    

Reviewed

 

                    2016 12:00 AM    TTE W/DOPPLER COMPLETE    Reviewed

 

                    2016 12:00 AM    EXTREMITY STUDY     Returned

 

                          2016 12:00 AM        B12 Injection, Up to 1000 Mcg NDC#5790-8119-56 [facility] 

                                        Reviewed

 

                    2016 12:00 AM    THER/PROPH/DIAG INJ SC/IM    Reviewed

 

                    2016 12:00 AM    THER/PROPH/DIAG INJ SC/IM    Reviewed

 

                    2016 12:00 AM    B12 Injection, Up to 1000 Mcg NDC#4671-1489-23    Reviewed

 

                    2012 12:00 AM    THER/PROPH/DIAG INJ SC/IM    Reviewed

 

                    2012 12:00 AM    B12 Injection ([employer] [facility]) Ndc#4080-3426-80    Reviewed

 

                    2012 12:00 AM    THER/PROPH/DIAG INJ SC/IM    Reviewed

 

                    2012 12:00 AM    B12 Injection([facility]) Ndc#0923-8243-95-    Reviewed

 

                    5/3/2012 12:00 AM    THER/PROPH/DIAG INJ SC/IM    Reviewed

 

                    5/3/2012 12:00 AM    B12 Injection([facility]) Ndc#7065-8001-47-    Reviewed

 

                    2012 12:00 AM    IMMUNOTHERAPY INJECTIONS    Reviewed

 

                    2012 12:00 AM    B12 Injection([facility]) Ndc#3349-3928-46-    Reviewed

 

                    2012 12:00 AM    THER/PROPH/DIAG INJ SC/IM    Reviewed

 

                    2012 12:00 AM    B12 Injection, Up to 1000 Mcg NDC#8058-6292-29    Reviewed

 

                    2012 12:00 AM    THER/PROPH/DIAG INJ SC/IM    Reviewed

 

                    2012 12:00 AM    B12 Injection, Up to 1000 Mcg NDC#3547-6238-97    Reviewed

 

                    2012 12:00 AM    THER/PROPH/DIAG INJ SC/IM    Reviewed

 

                    2012 12:00 AM    B12 Injection, Up to 1000 Mcg NDC#4342-2243-88    Reviewed

 

                    10/16/2012 12:00 AM    THER/PROPH/DIAG INJ SC/IM    Reviewed

 

                    10/16/2012 12:00 AM    B12 Injection, Up to 1000 Mcg NDC#5804-6274-32    Reviewed

 

                    2010 12:00 AM    COMPREHEN METABOLIC PANEL    Reviewed

 

                    2010 12:00 AM    COMPLETE CBC W/AUTO DIFF WBC    Reviewed

 

                    2010 12:00 AM    LIPID PANEL         Reviewed

 

                    2013 12:00 AM    Flu Injection 3 Years And Above NDC# [address]-41  [facility]    Reviewed



 

                    2013 12:00 AM    COMPLETE CBC W/AUTO DIFF WBC    Reviewed

 

                    2013 12:00 AM    ASSAY OF LITHIUM    Reviewed

 

                    2013 12:00 AM    METABOLIC PANEL TOTAL CA    Reviewed

 

                    4/3/2013 12:00 AM    THER/PROPH/DIAG INJ SC/IM    Reviewed

 

                    4/3/2013 12:00 AM    B12 Injection, Up to 1000 Mcg NDC#5174-1969-25    Reviewed

 

                    2013 12:00 AM    THER/PROPH/DIAG INJ SC/IM    Reviewed

 

                    2013 12:00 AM    B12 Injection, Up to 1000 Mcg NDC#1570-0121-30    Reviewed

 

                    2013 12:00 AM    THER/PROPH/DIAG INJ SC/IM    Reviewed

 

                    2013 12:00 AM    B12 Injection, Up to 1000 Mcg NDC#2574-5609-62    Reviewed

 

                    2013 12:00 AM    LIPID PANEL         Reviewed

 

                    2013 12:00 AM    VITAMIN D 25 HYDROXY    Reviewed

 

                    2013 12:00 AM    THER/PROPH/DIAG INJ SC/IM    Reviewed

 

                    3/6/2014 12:00 AM    THER/PROPH/DIAG INJ SC/IM    Reviewed

 

                    2014 12:00 AM    THER/PROPH/DIAG INJ SC/IM    Reviewed

 

                    2014 12:00 AM    B12 Injection, Up to 1000 Mcg NDC#2073-1426-20    Reviewed

 

                    2010 12:00 AM    SKIN FUNGI CULTURE    Reviewed

 

                    10/9/2010 12:00 AM    COMPREHEN METABOLIC PANEL    Reviewed

 

                    10/9/2010 12:00 AM    LIPID PANEL         Reviewed

 

                    2010 12:00 AM    THER/PROPH/DIAG INJ SC/IM    Reviewed

 

                    2010 12:00 AM    B12 Injection Ndc#[address]-51 ([facility])    Reviewed

 

                    2010 12:00 AM    THER/PROPH/DIAG INJ SC/IM    Reviewed

 

                    2010 12:00 AM    Kenalog 40 Mg Im-Ndc#[address]-67 ([facility])    Reviewed

 

                    10/15/2010 12:00 AM    FLU VACCINE 3 YRS & > IM    Reviewed

 

                    10/15/2010 12:00 AM    Admin.Of M/C Cov.Vaccine-Flu Vacc.    Reviewed

 

                    1/15/2011 12:00 AM    COMPLETE CBC W/AUTO DIFF WBC    Reviewed

 

                    1/15/2011 12:00 AM    COMPREHEN METABOLIC PANEL    Reviewed

 

                    1/15/2011 12:00 AM    LIPID PANEL         Reviewed

 

                    2014 12:00 AM    MAMMOGRAM SCREENING    Reviewed

 

                    2014 12:00 AM    Screening mammography, bilateral    Reviewed

 

                    7/10/2014 12:00 AM    THER/PROPH/DIAG INJ SC/IM    Reviewed

 

                    7/10/2014 12:00 AM    B12 Injection, Up to 1000 Mcg NDC#0973-6771-62    Reviewed

 

                    2011 12:00 AM    COMPLETE CBC W/AUTO DIFF WBC    Reviewed

 

                    2011 12:00 AM    COMPREHEN METABOLIC PANEL    Reviewed

 

                    2011 12:00 AM    LIPID PANEL         Reviewed

 

                    2014 12:00 AM    B12 Injection, Up to 1000 Mcg NDC#9239-0284-88    Reviewed

 

                    10/19/2014 12:00 AM    MAMMOGRAM SCREENING    Reviewed

 

                    10/19/2014 12:00 AM    Screening mammography, bilateral    Reviewed

 

                    10/16/2014 12:00 AM    COMPLETE CBC W/AUTO DIFF WBC    Reviewed

 

                    10/16/2014 12:00 AM    COMPREHEN METABOLIC PANEL    Reviewed

 

                    10/16/2014 12:00 AM    IMMUNOASSAY TUMOR     Reviewed

 

                    10/16/2014 12:00 AM    LIPID PANEL         Reviewed

 

                    10/16/2014 12:00 AM    ASSAY OF LITHIUM    Reviewed

 

                    10/16/2014 12:00 AM    MAMMOGRAM SCREENING    Reviewed

 

                    2011 12:00 AM    ASSAY OF PARATHORMONE    Reviewed

 

                    2011 12:00 AM    VITAMIN D 25 HYDROXY    Reviewed

 

                    2011 12:00 AM    ASSAY OF LITHIUM    Reviewed

 

                    2011 12:00 AM    METABOLIC PANEL TOTAL CA    Reviewed

 

                    2011 12:00 AM    CT HEAD/BRAIN W/O & W/DYE    Reviewed

 

                    3/23/2015 12:00 AM    PNEUMOCOCCAL VACC 13 GLENDY IM    Reviewed

 

                    3/23/2015 12:00 AM    Vitamin B12 injection    Reviewed

 

                    2011 12:00 AM    ASSAY OF LITHIUM    Reviewed

 

                    2011 12:00 AM    B12 Injection Ndc#[address]-80  [place]    Reviewed

 

                    2015 12:00 AM    THER/PROPH/DIAG INJ SC/IM    Reviewed

 

                    2015 12:00 AM    B12 Injection, Up to 1000 Mcg NDC#7929-1978-67    Reviewed

 

                    2015 12:00 AM    COMPLETE CBC W/AUTO DIFF WBC    Reviewed

 

                    2015 12:00 AM    COMPREHEN METABOLIC PANEL    Reviewed

 

                    2015 12:00 AM    LIPID PANEL         Reviewed

 

                    2015 12:00 AM    ASSAY OF LITHIUM    Reviewed

 

                    2011 12:00 AM    VIT D 1 25-DIHYDROXY    Reviewed

 

                    2011 12:00 AM    VITAMIN B-12        Reviewed

 

                    2015 12:00 AM    B12 Injection, Up to 1000 Mcg NDC#2990-9293-82    Reviewed

 

                    2015 12:00 AM    THER/PROPH/DIAG INJ SC/IM    Reviewed

 

                    2015 12:00 AM    B12 Injection, Up to 1000 Mcg NDC#2018-8027-26    Reviewed

 

                    2011 12:00 AM    THER/PROPH/DIAG INJ SC/IM    Reviewed

 

                    2011 12:00 AM    B12 Injection ([facility]) Ndc#0147-9402-87    Reviewed

 

                    2015 12:00 AM    THER/PROPH/DIAG INJ SC/IM    Reviewed

 

                    2015 12:00 AM    B12 Injection, Up to 1000 Mcg NDC#4705-4221-64    Reviewed







Results Summary







                          Data and Description      Results

 

                          2004 12:00 AM        Colonoscopy-Women and Men over 50 Normal 

 

                          2008 12:00 AM         Pap Smear Declined 

 

                          10/7/2009 12:00 AM        Cholest Cry Stone Ql .0 %LDLc SerPl-mCnc 123.0 mg/dLHDLc

 SerPl-mCnc 34.0 mg/dLTrigl SerPl-mCnc 190.0 mg/dLGlucose SerPl-mCnc 78.0 mg/dL

 

                          2009 12:00 AM        Mammogram -Women over 40 Normal HIV1+2 Ab Ser Ql no risk 

 

                          2010 8:47 AM         Dexa Bone Scan Refused Aspirin reccommended Contraindication 



 

                          2010 8:48 AM         Depression Done 

 

                          2010 12:00 AM         Foot Exam-Diabetic Done 

 

                          2010 12:00 AM         Cholest Cry Stone Ql .0 %LDLc SerPl-mCnc 126.0 mg/dLGlucose

 SerPl-mCnc 102.0 mg/dL

 

                          2010 8:45 AM          TRIGLYCERIDES 122.0 mg/dLCHOLESTEROL 186.0 mg/dLHDL 36.0 mg/dLLDL

 (CALC) 126.0 mg/dLGLUCOSE 102.0 mg/dLSODIUM 143.0 mmol/LPOTASSIUM 3.70 
mmol/LCHLORIDE 111.0 mmol/LCO2 23.0 mmol/LBUN 10.0 mg/dLCREATININE 0.80 
mg/dLSGOT/AST 12.0 IU/LSGPT/ALT 11.0 IU/LALK PHOS 65.0 IU/LTOTAL PROTEIN 7.20 
g/dLALBUMIN 3.90 g/dLTOTAL BILI 0.50 mg/dLCALCIUM 10.20 mg/dLeGFR >60 
mL/min/1.73 m2WBC 5.7 RBC 3.26 HGB 10.60 g/dLHCT 31.70 %MCV 97.0 fLMCH 32.50 
pgMCHC 33.40 g/dLRDW CV 13.30 %MPV 9.70 fLPLT 287 %NEUT 62.90 %%LYMP 21.80 
%%MONO 9.90 %%EOS 5.0 %%BASO 0.40 %#NEUT 3.56 #LYMP 1.23 #MONO 0.56 #EOS 0.28 
#BASO 0.02 

 

                          2010 12:00 AM        Glucose SerPl-mCnc 96.0 mg/dLCholest Cry Stone Ql .0 %LDLc

 SerPl-mCnc 146.0 mg/dL

 

                          2010 8:26 AM         TRIGLYCERIDES 106.0 mg/dLCHOLESTEROL 199.0 mg/dLHDL 32.0 mg/dLLDL

 (CALC) 146.0 mg/dLGLUCOSE 96.0 mg/dLSODIUM 143.0 mmol/LPOTASSIUM 4.0 
mmol/LCHLORIDE 113.0 mmol/LCO2 24.0 mmol/LBUN 13.0 mg/dLCREATININE 1.0 
mg/dLSGOT/AST 11.0 IU/LSGPT/ALT 6.0 IU/LALK PHOS 56.0 IU/LTOTAL PROTEIN 6.60 
g/dLALBUMIN 3.80 g/dLTOTAL BILI 0.50 mg/dLCALCIUM 9.30 mg/dLeGFR 57 

 

                          10/6/2010 12:00 AM        Cholest Cry Stone Ql .0 %LDLc SerPl-mCnc 111.0 mg/dLGlucose

 SerPl-mCnc 81.0 mg/dL

 

                          10/6/2010 2:45 PM         TRIGLYCERIDES 123.0 mg/dLCHOLESTEROL 178.0 mg/dLHDL 42.0 mg/dLLDL

 (CALC) 111.0 mg/dLGLUCOSE 81.0 mg/dLSODIUM 139.0 mmol/LPOTASSIUM 4.10 
mmol/LCHLORIDE 106.0 mmol/LCO2 24.0 mmol/LBUN 13.0 mg/dLCREATININE 0.90 
mg/dLSGOT/AST 13.0 IU/LSGPT/ALT 11.0 IU/LALK PHOS 61.0 IU/LTOTAL PROTEIN 7.10 
g/dLALBUMIN 3.90 g/dLTOTAL BILI 0.30 mg/dLCALCIUM 9.30 mg/dLeGFR >60 mL/min/1.73
 m2WBC 6.9 RBC 3.59 HGB 11.50 g/dLHCT 35.30 %MCV 98.0 fLMCH 32.0 pgMCHC 32.60 
g/dLRDW CV 12.90 %MPV 9.90 fLPLT 311 %NEUT 64.90 %%LYMP 22.50 %%MONO 7.20 %%EOS 
5.10 %%BASO 0.30 %#NEUT 4.45 #LYMP 1.54 #MONO 0.49 #EOS 0.35 #BASO 0.02 

 

                          2011 12:00 AM         Mammogram -Women over 40 Ordered 

 

                          2011 10:25 AM        TRIGLYCERIDES 111.0 mg/dLCHOLESTEROL 195.0 mg/dLHDL 43.0 mg/dLLDL

 (CALC) 130.0 mg/dLWBC 5.3 RBC 3.76 HGB 12.0 g/dLHCT 37.80 %.0 fLMCH 
31.90 pgMCHC 31.70 g/dLRDW CV 13.0 %MPV 9.70 fLPLT 259 %NEUT 69.0 %%LYMP 17.60 
%%MONO 8.30 %%EOS 4.70 %%BASO 0.40 %#NEUT 3.63 #LYMP 0.93 #MONO 0.44 #EOS 0.25 
#BASO 0.02 GLUCOSE 102.0 mg/dLSODIUM 146.0 mmol/LPOTASSIUM 4.20 mmol/LCHLORIDE 
113.0 mmol/LCO2 23.0 mmol/LBUN 15.0 mg/dLCREATININE 1.0 mg/dLSGOT/AST 12.0 
IU/LSGPT/ALT 17.0 IU/LALK PHOS 60.0 IU/LTOTAL PROTEIN 6.90 g/dLALBUMIN 4.20 
g/dLTOTAL BILI 0.40 mg/dLCALCIUM 9.70 mg/dLeGFR 57 

 

                          2011 11:49 AM        Cholest Cry Stone Ql .0 %LDLc SerPl-mCnc 130.0 mg/dLHDLc

 SerPl-mCnc 43.0 mg/dLTrigl SerPl-mCnc 111.0 mg/dLGlucose SerPl-mCnc 102.0 mg/dL

 

                          2011 11:52 AM        Pap Smear Declined 

 

                          2011 11:28 AM        Lithium 2.080 mmol/LGLUCOSE 102.0 mg/dLSODIUM 135.0 mmol/LPOTASSIUM

 3.90 mmol/LCHLORIDE 106.0 mmol/LCO2 21.0 mmol/LBUN 12.0 mg/dLCREATININE 1.30 
mg/dLCALCIUM 10.70 mg/dLeGFR 42 

 

                          2011 8:58 AM          Lithium 0.690 mmol/L

 

                          2011 2:38 PM         VITAMIN B12 3483.0 pg/mL

 

                          2013 3:35 PM          WBC 5.1 RBC 3.73 HGB 11.70 g/dLHCT 36.40 %MCV 98.0 fLMCH 31.40

 pgMCHC 32.10 g/dLRDW CV 13.10 %MPV 9.80 fLPLT 224 %NEUT 66.80 %%LYMP 19.10 
%%MONO 9.0 %%EOS 4.90 %%BASO 0.20 %#NEUT 3.42 #LYMP 0.98 #MONO 0.46 #EOS 0.25 
#BASO 0.01 GLUCOSE 88.0 mg/dLSODIUM 141.0 mmol/LPOTASSIUM 4.10 mmol/LCHLORIDE 
110.0 mmol/LCO2 22.0 mmol/LBUN 22.0 mg/dLCREATININE 1.10 mg/dLCALCIUM 9.80 
mg/dLeGFR 50 Lithium 0.760 mmol/L

 

                          2013 11:02 AM        TRIGLYCERIDES 106.0 mg/dLCHOLESTEROL 181.0 mg/dLHDL 46.0 mg/dLLDL

 (CALC) 114.0 mg/dLVITAMIN D 41.10 ng/mL

 

                          10/17/2014 10:10 AM       WBC 5.0 RBC 3.66 HGB 11.60 g/dLHCT 36.80 %.0 fLMCH 31.70

 pgMCHC 31.50 g/dLRDW CV 13.50 %MPV 10.10 fLPLT 209 %NEUT 69.20 %%LYMP 21.0 
%%MONO 6.40 %%EOS 3.20 %%BASO 0.20 %#NEUT 3.46 #LYMP 1.05 #MONO 0.32 #EOS 0.16 
#BASO 0.01 GLUCOSE 100.0 mg/dLSODIUM 148.0 mmol/LPOTASSIUM 3.90 mmol/LCHLORIDE 
114.0 mmol/LCO2 26.0 mmol/LBUN 12.0 mg/dLCREATININE 1.20 mg/dLSGOT/AST 9.0 
IU/LSGPT/ALT <6 IU/LALK PHOS 82.0 IU/LTOTAL PROTEIN 6.90 g/dLALBUMIN 4.0 
g/dLTOTAL BILI 0.40 mg/dLCALCIUM 10.50 mg/dLeGFR 45 TRIGLYCERIDES 96.0 
mg/dLCHOLESTEROL 155.0 mg/dLHDL 38.0 mg/dLLDL (CALC) 98.0 mg/dLLithium 0.850 
mmol/LCancer Antigen (CA) 125 8.30 U/mL

 

                          2015 10:25 AM        Lithium 0.790 mmol/LWBC 4.8 RBC 3.44 HGB 11.0 g/dLHCT 35.20 

%.0 fLMCH 32.0 pgMCHC 31.30 g/dLRDW CV 14.0 %MPV 9.30 fLPLT 210 %NEUT 
70.80 %%LYMP 17.20 %%MONO 8.10 %%EOS 3.50 %%BASO 0.40 %#NEUT 3.41 #LYMP 0.83 
#MONO 0.39 #EOS 0.17 #BASO 0.02 TRIGLYCERIDES 107.0 mg/dLCHOLESTEROL 174.0 
mg/dLHDL 43.0 mg/dLLDL (CALC) 110.0 mg/dLGLUCOSE 90.0 mg/dLSODIUM 145.0 
mmol/LPOTASSIUM 3.80 mmol/LCHLORIDE 115.0 mmol/LCO2 24.0 mmol/LBUN 17.0 mg
/dLCREATININE 1.30 mg/dLSGOT/AST 18.0 IU/LSGPT/ALT 17.0 IU/LALK PHOS 56.0 
IU/LTOTAL PROTEIN 6.70 g/dLALBUMIN 3.90 g/dLTOTAL BILI 0.40 mg/dLCALCIUM 9.80 
mg/dLeGFR 41 

 

                          2015 8:50 AM        WBC 5.8 RBC 3.29 HGB 10.70 g/dLHCT 34.0 %.0 fLMCH 32.50

 pgMCHC 31.50 g/dLRDW CV 13.60 %MPV 9.60 fLPLT 223 %NEUT 69.60 %%LYMP 18.90 
%%MONO 8.50 %%EOS 2.80 %%BASO 0.20 %#NEUT 4.03 #LYMP 1.09 #MONO 0.49 #EOS 0.16 
#BASO 0.01 Lithium 0.620 mmol/LGLUCOSE 83.0 mg/dLSODIUM 139.0 mmol/LPOTASSIUM 
3.90 mmol/LCHLORIDE 109.0 mmol/LCO2 22.0 mmol/LBUN 19.0 mg/dLCREATININE 1.40 
mg/dLSGOT/AST 19.0 IU/LSGPT/ALT 21.0 IU/LALK PHOS 55.0 IU/LTOTAL PROTEIN 6.50 
g/dLALBUMIN 3.90 g/dLTOTAL BILI 0.50 mg/dLCALCIUM 9.60 mg/dLeGFR 38 
TRIGLYCERIDES 121.0 mg/dLCHOLESTEROL 192.0 mg/dLHDL 51.0 mg/dLLDL 121.0 mg/dLTSH
 1.210 uIU/mLHemoglobin A1c 5.40 %

 

                          3/15/2016 8:08 AM         VITAMIN B12 696.0 pg/mL

 

                          3/23/2016 8:26 AM         WBC 7.0 RBC 3.61 HGB 11.80 g/dLHCT 37.70 %.0 fLMCH 32.70

 pgMCHC 31.30 g/dLRDW CV 12.50 %MPV 10.0 fLPLT 207 %NEUT 73.60 %%LYMP 16.40 
%%MONO 6.60 %%EOS 3.0 %%BASO 0.30 %#NEUT 5.15 #LYMP 1.15 #MONO 0.46 #EOS 0.21 
#BASO 0.02 Lithium 0.940 mmol/LGLUCOSE 108.0 mg/dLSODIUM 143.0 mmol/LPOTASSIUM 
4.30 mmol/LCHLORIDE 110.0 mmol/LCO2 27.0 mmol/LBUN 16.0 mg/dLCREATININE 1.60 
mg/dLSGOT/AST 13.0 IU/LSGPT/ALT 7.0 IU/LALK PHOS 71.0 IU/LTOTAL PROTEIN 6.80 
g/dLALBUMIN 4.0 g/dLTOTAL BILI 0.20 mg/dLCALCIUM 10.40 mg/dLeGFR 32 
TRIGLYCERIDES 113.0 mg/dLCHOLESTEROL 169.0 mg/dLHDL 42.0 mg/dLLDL (CALC) 104.0 
mg/dLTSH 2.20 uIU/mLHemoglobin A1c 5.20 %

 

                          3/25/2016 9:17 AM         COLOR YELLOW APPEARANCE CLEAR SPEC GRAV 1.010 pH 7.0 PROTEIN 

NEGATIVE GLUCOSE NEGATIVE mg/dLKETONE NEGATIVE BILIRUBIN NEGATIVE BLOOD NEGATIVE
 NITRITE NEGATIVE LEUK SCREEN SMALL CASTS/LPF NEGATIVE /LPFCRYSTALS NEGATIVE 
MUCOUS THRDS NEGATIVE BACTERIA NEGATIVE EPITH CELLS FEW SQUAMOUS /HPFTRICHOMONAS
 NEGATIVE YEAST NEGATIVE 







History Of Immunizations







       Name    Date Admin    Mfg Name    Mfg Code    Trade Name    Lot#    Route    Inj    Vis Given    Vis

 Pub                                    CVX

 

        Influenza    2008    Not Entered    NE      Not Entered            Not Entered    Not Entered

                    1            999

 

        X       12/19/2008    [employer]    MSD     Pneumovax 23            Intramuscular    Not Entered

                    1            999

 

           Influenza    10/15/2010    [facility]    NOV        Fluvirin > 12 Years    

[id number]     Intramuscular    Left Deltoid    10/15/2010    2009    999

 

          X         3/23/2015    [facility]    [facility]       Prevnar 13    [id number]    Intramuscular

                Right Gluteous Medius    3/23/2015       2013       109







History of Past Illness







                    Name                Date of Onset       Comments

 

                    Peritoneal Neoplasm, Malignant                         

 

                    Hyperlipidemia                           

 

                    Bipolar disorder, unspecified                         

 

                    Artificial opening status; colostomy                         

 

                    B12 deficiency                           

 

                    Anemia, Pernicious                         

 

                    Arthritis unspecified                         

 

                    cervical cancer                          

 

                    Artificial opening status; colostomy    2010  1:10PM     

 

                    Bipolar disorder, unspecified    2010  1:10PM     

 

                    Hyperlipidemia      2010  1:10PM     

 

                    Anemia, Pernicious    2010  1:10PM     

 

                    Postoperative Follow-Up    2010  1:55PM     

 

                    Postoperative Follow-Up    Mar  8 2010 10:57AM     

 

                    Artificial opening status; colostomy    Mar  8 2010  1:19PM     

 

                    Peritoneal Neoplasm, Malignant    Mar  8 2010  1:19PM     

 

                    Artificial opening status; colostomy    2010  1:40PM     

 

                    Hyperlipidemia      2010  1:40PM     

 

                    Anemia, Pernicious    2010  1:40PM     

 

                    Peritoneal Neoplasm, Malignant    2010  1:40PM     

 

                    Arthritis unspecified    2010  1:40PM     

 

                    Anemia of Chronic Illness    2010  1:40PM     

 

                    Tinea corporis      2010  3:17PM     

 

                    Bipolar disorder, unspecified    2010  1:33PM     

 

                    Hyperlipidemia      2010  1:33PM     

 

                    Anemia, Pernicious    2010  1:33PM     

 

                    Peritoneal Neoplasm, Malignant    2010  1:33PM     

 

                    B12 deficiency      2010  1:33PM     

 

                    Ethmoidal Sinusitis, Acute    Sep 21 2010  3:53PM     

 

                    Wheezing            Sep 21 2010  3:53PM     

 

                    Flu                 Oct 15 2010  1:40PM     

 

                    Bipolar disorder, unspecified    Oct 15 2010  1:42PM     

 

                    Hyperlipidemia      Oct 15 2010  1:42PM     

 

                    Anemia, Pernicious    Oct 15 2010  1:42PM     

 

                    Peritoneal Neoplasm, Malignant    Oct 15 2010  1:42PM     

 

                    Bipolar disorder, unspecified    2011 12:01PM     

 

                    Hyperlipidemia      2011 12:01PM     

 

                    Anemia, Pernicious    2011 12:01PM     

 

                    Peritoneal Neoplasm, Malignant    2011 12:01PM     

 

                    Bipolar disorder, unspecified    Apr 15 2011 10:55AM     

 

                    Major Depression    2011 10:11AM     

 

                    Bipolar Disorder    2011 10:11AM     

 

                    Cancer              May 10 2011  4:16PM     

 

                    Major Depression    May 10 2011  3:16PM     

 

                    Bipolar Disorder    May 10 2011  3:16PM     

 

                    Hypercalcemia       May 23 2011  2:47PM     

 

                    Bipolar disorder, unspecified    May 23 2011  2:47PM     

 

                    Colon Cancer, Personal History    May 23 2011  2:47PM     

 

                    Bipolar Disorder    May 31 2011  4:39PM     

 

                    Depressive Disorder    2011 10:01AM     

 

                    Vitamin B12 deficiency    2011 10:01AM     

 

                    Vitamin D Deficiency    2011  5:07PM     

 

                    Anemia, Vitamin B12 Deficiency    2011  5:07PM     

 

                    B12 deficiency      2011  3:56PM     

 

                    Routine gynecological examination    Aug  4 2011  9:08AM     

 

                    Screening Examination for Breast Cancer    Aug  4 2011  9:08AM     

 

                    Tinea Corporis      Aug  4 2011  9:08AM     

 

                    Depressive Disorder    Sep 23 2011  8:47AM     

 

                    Contact Dermatitis    Sep 23 2011  8:47AM     

 

                    Anemia, Pernicious    Sep 23 2011  8:47AM     

 

                    B12 deficiency      Sep 23 2011  8:47AM     

 

                    B12 deficiency      Sep 27 2011  2:58PM     

 

                    B12 deficiency      Oct 20 2011  2:34PM     

 

                    Flu                 Dec  9 2011  3:16PM     

 

                    B12 deficiency      Dec  9 2011  3:17PM     

 

                    B12 deficiency      2012  4:52PM     

 

                    B12 deficiency      2012 11:10AM     

 

                    B12 deficiency      2012  3:37PM     

 

                    B12 deficiency      May  3 2012  4:10PM     

 

                    B12 deficiency      2012  2:54PM     

 

                    B12 deficiency      2012 11:23AM     

 

                    B12 deficiency      Aug  9 2012  2:08PM     

 

                    B12 deficiency      Sep  6 2012  4:36PM     

 

                    B12 deficiency      Oct 16 2012 10:23AM     

 

                    Flu                 2013  3:11PM     

 

                    Bipolar disorder, unspecified    b  2013  2:48PM     

 

                    Anemia, Pernicious    b  2013  2:48PM     

 

                    B12 deficiency      Fe2013  2:48PM     

 

                    Extrapyramidal abnormal movement disorder    Fe2013  2:48PM     

 

                    B12 deficiency      Apr  3 2013 12:03PM     

 

                    Bipolar disorder, unspecified    May  7 2013  1:31PM     

 

                    Anemia, Pernicious    May  7 2013  1:31PM     

 

                    B12 deficiency      May  7 2013  1:31PM     

 

                    Extrapyramidal abnormal movement disorder    May  7 2013  1:31PM     

 

                    B12 deficiency      2013  3:42PM     

 

                    B12 deficiency      2013  1:31PM     

 

                    Hyperlipidemia      Aug  7 2013 10:37AM     

 

                    Vitamin D Deficiency    Aug  7 2013 10:37AM     

 

                    Bipolar disorder, unspecified    Aug  7 2013 10:37AM     

 

                    Anemia, Pernicious    Aug  7 2013 10:37AM     

 

                    B12 deficiency      Aug  7 2013 10:37AM     

 

                    B12 deficiency      Sep 25 2013 11:15AM     

 

                    B12 deficiency      Dec 11 2013  3:16PM     

 

                    B12 deficiency      Mar  6 2014  1:48PM     

 

                    B12 deficiency      May 21 2014  3:17PM     

 

                    Screening Examination for Breast Cancer    2014  3:23PM     

 

                    Periumbilical abdominal pain    2014  3:23PM     

 

                    B12 deficiency      Jul 10 2014  2:52PM     

 

                    Anemia, Vitamin B12 Deficiency    Aug 13 2014  4:50PM     

 

                    Bipolar disorder    Oct 16 2014 11:13AM     

 

                    Hyperlipidemia      Oct 16 2014 11:13AM     

 

                    Anemia, Pernicious    Oct 16 2014 11:13AM     

 

                    Peritoneal Neoplasm, Malignant    Oct 16 2014 11:13AM     

 

                    Screening breast examination    Oct 16 2014 11:13AM     

 

                    Weight loss         Oct 16 2014 11:13AM     

 

                    Anemia, Pernicious    Mar 23 2015  2:57PM     

 

                    B12 deficiency      Mar 23 2015  2:57PM     

 

                    Need for Prevnar vaccine    Mar 23 2015  2:57PM     

 

                    Bipolar disorder    Mar 23 2015  2:57PM     

 

                    Hyperlipidemia      Mar 23 2015  2:57PM     

 

                    Anemia, Pernicious    Mar 23 2015  2:57PM     

 

                    Peritoneal Neoplasm, Malignant    Mar 23 2015  2:57PM     

 

                    B12 deficiency      May  4 2015  4:48PM     

 

                    Hyperlipidemia      May 13 2015  9:56AM     

 

                    Anemia              May 13 2015  9:56AM     

 

                    Bipolar disorder    May 13 2015  9:56AM     

 

                    Bipolar disorder    May 14 2015  3:27PM     

 

                    Hyperlipidemia      May 14 2015  3:27PM     

 

                    Anemia, Pernicious    May 14 2015  3:27PM     

 

                    Peritoneal Neoplasm, Malignant    May 14 2015  3:27PM     

 

                    B12 deficiency      2015  2:20PM     

 

                    B12 deficiency      2015 11:34AM     

 

                    B12 deficiency      Aug 18 2015  9:06AM     

 

                    Tinea Corporis      Sep 18 2015  8:54AM     

 

                    B12 deficiency      Sep 18 2015  8:54AM     

 

                    B12 deficiency      2015 10:28AM     

 

                    Herpes zoster without complication    Dec  3 2015  9:52AM     

 

                    B12 deficiency      Dec 23 2015 11:21AM     

 

                    B12 deficiency      2016  4:51PM     

 

                    Vitamin B 12 deficiency    Mar 14 2016  5:35PM     

 

                    B12 deficiency      Mar 15 2016 12:14PM     

 

                    B12 deficiency      May  5 2016 11:30AM     

 

                    Edema               May  5 2016 11:30AM     

 

                    Dermatitis          May  5 2016 11:30AM     

 

                    Edema               May 17 2016  8:38AM     

 

                    Shortness of breath    May 17 2016  8:38AM     

 

                    Bilateral edema of lower extremity    2016  2:06PM     

 

                    B12 deficiency      2016  2:06PM     

 

                    B12 deficiency      2016 11:50AM     

 

                    B12 deficiency      2016 11:20AM     

 

                    Diarrhea            Aug  2 2016  3:13PM     







Payers







           Insurance Name    Company Name    Plan Name    Plan Number    Policy Number    Policy Group

 Number                                 Start Date

 

                    Medicare Part A    Medicare RHC              [id number]              N/A

 

                          [employer]    [employer]                 [id number]

                                                    

 

                    Medicare Part A    Medicare - Lab/Xray              [id number]              2006

 

                    Medicare Part B    [employer] [place]              [id number]              2006

 

                          [facility]    [employer]                 50 percent

                                                    2009

 

                    [facility]    [employer]              [id number]              N/A

 

                    Medicare Part A    Medicare Part A              [id number]              N/A

 

                    Medicare Part A    Medicare Part A              [id number]              2006









History of Encounters







                    Visit Date          Visit Type          Provider

 

                    2016            Office visit        Bhupinder Louise DO

 

                    2016           Nurse visit         Bhupinder Louise DO

 

                    2016           Office visit        Bret GREEN

 

                    2016            Office visit        Bhupinder Louise DO

 

                    3/15/2016           Nurse visit         Bhupinder Louise DO

 

                    2016            Nurse visit         Bhupinder Louise DO

 

                    2015          Nurse visit         Bhupinder Louise DO

 

                    12/3/2015           Office visit        Bhupinder Louise DO

 

                    2015          Nurse visit         Bhupinder Louise DO

 

                    2015           Office visit        Bhupinder Aspen DO

 

                    2015           Nurse visit         Bhupinder Aspen DO

 

                    2015            Nurse visit         Bhupinder Aspen DO

 

                    2015            Nurse visit         Bhupinder Aspen DO

 

                    2015           Office visit        Bhupinder Aspen DO

 

                    2015            Nurse visit         Bhupinder Aspen DO

 

                    3/23/2015           Office visit        Bhupinder Aspen DO

 

                    10/16/2014          Office visit        Bhupinder Aspen DO

 

                    2014           Nurse visit         Radha Ontiveros APRN

 

                    7/10/2014           Nurse visit         Bhupinder Aspen DO

 

                    2014           Office visit        Bhupinder Aspen DO

 

                    2014           Nurse visit         Bhupinder Aspen DO

 

                    3/6/2014            Nurse visit         Bhupinder Aspen DO

 

                    2014            [facility]            EARNEST Lopez MD

 

                    2013          Nurse visit         Bhupinder Aspen DO

 

                    2013           Nurse visit         Bhupinder Aspen DO

 

                    2013            Office visit        Bhupinder Aspen DO

 

                    2013            Nurse visit         Bhupinder Aspen DO

 

                    2013            Nurse visit         Bhupinder Aspen DO

 

                    2013            Office visit        Bhupinder Aspen DO

 

                    4/3/2013            Nurse visit         Bhupinder Aspen DO

 

                    2013            Office visit        Bhupinder Aspen DO

 

                    10/16/2012          Nurse visit         Bhupinder Aspen DO

 

                    2012            Nurse visit         Bhupinder Aspen DO

 

                    2012            Voided              Bhupinder Aspen DO

 

                    2012            Nurse visit         Bhupinder Aspen DO

 

                    2012            Nurse visit         Bhupinder Aspen DO

 

                    2012           Nurse visit         Bhupinder Aspen DO

 

                    5/3/2012            Nurse visit         Bhupinder Aspen DO

 

                    2012           Nurse visit         Bhupinder Aspen DO

 

                    2012           Nurse visit         Bhupinder Aspen DO

 

                    2012           Nurse visit         Bhupinder Aspen DO

 

                    2011           Nurse visit         Bhupinder Aspen DO

 

                    10/20/2011          Nurse visit         Bhupinder Aspen DO

 

                    2011           Office visit        Bhupinder Aspen DO

 

                    2011           Nurse visit         Radha Ontiveros APRN

 

                    2011            Office visit        Bhupinder Aspen DO

 

                    2011           Nurse visit         Bhupinder Aspen DO

 

                    2011            Office visit        Bhupinder Louise DO

 

                    2011           Office visit        Bhupinder Louise DO

 

                    5/10/2011           Office visit        Bhupinder Louise DO

 

                    2011           Office visit        Bhupinder Louise DO

 

                    4/15/2011           Office visit        Devin Angel DO

 

                    2011           Office visit        Devin Angel DO

 

                    10/15/2010          Office visit        Devin Angel DO

 

                    2010           Office visit        Devin Angel DO

 

                    2010            Office visit        Devin Angel DO

 

                    2010           Office visit        Devin Angel DO

 

                    2010            Office visit        Devin Angel DO

 

                    3/8/2010            Office visit        Devin Masterson MD

 

                    2010            Surgery             Devin Masterson MD

 

                    2010            Office visit        Devin Angel DO

 

                    2010           Surgery             Devin Masterson MD

 

                    2010           Hospital            Devin Masterson MD

 

                    2010           [facility]            Devin Masterson MD

 

                    10/22/2009          Office visit        Devin Angel DO

## 2019-06-26 NOTE — XMS REPORT
MU2 Ambulatory Summary

                             Created on: 2017



Pauline Gan

External Reference #: [id number]

: 1950

Sex: Female



Demographics







                          Address                   [address]

[place]  [address]

 

                          Home Phone                [phone]

 

                          Preferred Language        English

 

                          Marital Status            Legally 

 

                          Denominational Affiliation     Unknown

 

                          Race                      White

 

                          Ethnic Group              Not  or 





Author







                          Author                    Radha Ontiveros

 

                          [facility]

 

                          Address                   [address]

[place], [place]  [id number]



 

                          Phone                     [phone]







Care Team Providers







                    Care Team Member Name    Role                Phone

 

                    Radha Ontiveros       PCP                 Unavailable

 

                    Bhupinder Louise    PreferredProvider    Unavailable







Allergies and Adverse Reactions







                    Name                Reaction            Notes

 

                    NO KNOWN DRUG ALLERGIES                         







Plan of Treatment







             Planned Activity    Comments     Planned Date    Planned Time    Plan/Goal

 

             Injection, Subcutaneous/IM                 2016    12:00 AM      

 

             Injection, Subcutaneous/IM                 2016    12:00 AM      

 

             Mammography; bilateral                 2016    12:00 AM      

 

             Injection, Subcutaneous/IM                 2016    12:00 AM      

 

             CBC with Auto                 2013     12:00 AM      

 

             Lithium                   2013     12:00 AM      

 

             Basic metabolic panel                 2013     12:00 AM      

 

             Vitamin B12 (cyanocobalamin)                 2013     12:00 AM      

 

             Folic acid, serum                 2013     12:00 AM      

 

             Injection,Subcutaneous/Intramuscul                 2013    12:00 AM      







Medications







                                        Active 

 

             Name         Start Date    Estimated Completion Date    SIG          Comments

 

                Latuda 20 mg oral tablet                                    take 1 tablet (20 mg) by oral route once daily with

 food (at least 350 calories)            

 

             pravastatin 40 mg oral tablet    3/30/2015                 TAKE 1 TABLET BY MOUTH DAILY     

 

                Namenda XR 28 mg oral capsule,sprinkle,ER 24hr    2015                       take 1 capsule (28

 mg) by oral route once daily            

 

                Namenda XR 28 mg oral capsule,sprinkle,ER 24hr    2016                       take 1 capsule (28

 mg) by oral route once daily            

 

                triamcinolone acetonide 0.1 % topical cream    2016                        apply a thin layer to 

the affected area(s) by topical route 2 times per day     

 

                potassium chloride 10 mEq oral tablet extended release    2016                       take 1 tablet

 (10 meq) by oral route once daily       

 

             pravastatin 40 mg oral tablet    2016                 TAKE 1 TABLET BY MOUTH DAILY     

 

                Vitamin B-12 1,000 mcg/mL injection solution    2016                       inject 1 milliliter 

(1,000 mcg) by intramuscular route once a month     

 

                potassium chloride 10 mEq oral tablet extended release    2016                      take 1 tablet

 (10 meq) by oral route once daily       

 

                Namenda XR 28 mg oral capsule,sprinkle,ER 24hr    2016                      TAKE 1 CAPSULE BY

 MOUTH EVERY DAY                         

 

                furosemide 40 mg oral tablet    2016                      take 1 tablet (40 mg) by oral route

 once daily                              

 

                metoclopramide HCl 10 mg oral tablet    2017       take 1 tablet by oral

 route 2 times a day for 50 days         

 

                Augmentin 875-125 mg oral tablet    2017       take 1 tablet by oral route

 every 12 hours for 7 days               









                                         

 

             Name         Start Date    Expiration Date    SIG          Comments

 

             Reglan 10mg    3/29/2010    2010    one ac and hs     

 

                Keflex 500 mg oral capsule    2010       10/1/2010       take 1 capsule (500 mg) by oral

 route every 6 hours for 10 days         

 

                Bactrim -160 mg oral tablet    2011       take 1 tablet by oral route

 every 12 hours for 7 days               

 

                triamcinolone acetonide 0.1 % topical cream    2011      apply a thin

 layer to the affected area(s) by topical route 2 times per day     

 

                sertraline 100 mg oral tablet    4/10/2012       5/10/2012       take 1.5 tablets by oral route

 daily for 30 days                       

 

                ergocalciferol (vitamin D2) 50,000 unit oral capsule    4/15/2013       2013       TAKE

 ONE CAPSULE BY MOUTH ONCE A WEEK        

 

                CYANOCOBALAM 1000MCGINJ 1000 milliliter    2013       INJECT 1ML INTRAMUSCULAR

 ONCE A MONTH                            

 

                pravastatin 40 mg oral tablet    3/25/2014       3/20/2015       TAKE ONE TABLET BY MOUTH EVERY

 DAY                                     

 

                          Zostavax (PF) 19,400 unit/0.65 mL subcutaneous suspension for reconstitution    3/23/2015

                    3/24/2015           inject 0.65 milliliter by subcutaneous route once     

 

                famciclovir 500 mg oral tablet    12/3/2015       12/10/2015      take 1 tablet (500 mg) by

 oral route every 8 hours for 7 days     

 

                furosemide 40 mg oral tablet    2016      take 1 tablet (40 mg) by oral

 route once daily                        

 

                Cipro 500 mg oral tablet    2016       take 1 tablet (500 mg) by oral route

 2 times per day for 5 days              

 

                Bactrim -160 mg oral tablet    2016        take 1 tablet by oral route

 every 12 hours for 7 days               

 

                Macrobid 100 mg oral capsule    2017       take 1 capsule (100 mg) by oral

 route 2 times per day with food for 7 days     









                                        Discontinued 

 

             Name         Start Date    Discontinued Date    SIG          Comments

 

                Tylenol 325 mg oral tablet                    2013        take 1 - 2 tablets (325 -650 mg) by oral

 route every 4-6 hours as needed         

 

                Calcium 600 + D(3) 600 mg(1,500mg) -400 unit oral tablet                    2011       take 1 tablet

 by oral route 2 times a day            no longer taking

 

                Vitamin B-12 1,000 mcg oral tablet extended release    2010       take 1

 tablet by oral route daily             no longer taking

 

                Antifungal (clotrimazole) 1 % topical cream    2010       apply to the 

affected and surrounding areas of skin by topical route 2 times per day morning 
and evening                              

 

                sertraline 100 mg oral tablet    5/10/2011       2011       take 2 tablets (200 mg) by 

oral route once daily                   discontinued by Dr. Serrano

 

                mirtazapine 15 mg oral tablet                    2011        take 1 tablet (15 mg) by oral route 

once daily before bedtime               Dr. Serrano

 

                mirtazapine 15 mg oral tablet                    2011        take 1 tablet (15 mg) by oral route 

once daily before bedtime               dc'd by Dr. Serrano

 

                Pristiq 50 mg oral tablet extended release 24 hr                    2013        take 1 tablet (50

 mg) by oral route once daily           Dr. Serrano

 

                Pristiq 50 mg oral tablet extended release 24 hr                    2013        take 1 tablet (50

 mg) by oral route once daily           dose updated

 

                Vitamin B-12 1,000 mcg/mL injection solution    2011        inject 1 milliliter

 (1,000 mcg) by intramuscular route once a month    on list already

 

                    syringe with needle 1 mL 25 gauge x 1" miscellaneous syringe    2011

                          use for injection once a month     

 

                clotrimazole 1 % topical cream    2011        apply to the affected and surrounding

 areas of skin by topical route 2 times per day in the morning and evening     

 

                Vitamin D2 50,000 unit oral capsule    2011        take 1 capsule (50,000

 unit) by oral route once weekly        generic on list

 

                Pravachol 40 mg oral tablet    2012        take 1 tablet (40 mg) by oral 

route once daily for 90 days            generic on list

 

                lithium carbonate 300 mg oral capsule    2012        take 1 capsule by oral

 route daily                            dose updated

 

                Pristiq 100 mg oral tablet extended release 24 hr                    4/10/2012       take 1 and 1/2 

tablet (150 mg) by oral route once daily    Mental Health provider

 

                Pristiq 100 mg oral tablet extended release 24 hr                    4/10/2012       take 1 and 1/2 

tablet (150 mg) by oral route once daily    Discontinued by Dr Efrain Knight at [facility]

 

                hydroxyzine HCl 50 mg oral tablet    10/16/2014      2015       take 1 tablet (50 mg) 

by oral route at bedtime                 

 

                lithium carbonate 300 mg oral capsule    2015       take 1 capsule (300

 mg) by oral route 2 for 30 days         

 

                fluconazole 100 mg oral tablet    2015       12/3/2015       take 1 tablet (100 mg) by 

oral route once a week                   

 

                ketoconazole 2 % topical cream    2015       12/3/2015       apply to the affected area(s)

 by topical route 2 times per day        

 

                prednisone 10 mg oral tablet    12/3/2015       2016        take 2 tablets (20 mg) by oral

 route once daily for 4 days 1 tablet daily for 4 days 0.5 tablet daily for 4 
days                                     

 

                Cipro 500 mg oral tablet    1/15/2017       2017       take 1 tablet (500 mg) by oral route

 every 12 hours for 10 days              







Problem List







                    Description         Status              Onset

 

                    Artificial opening status; colostomy    Active               

 

                    Bipolar disorder, unspecified    Active               

 

                    Hyperlipidemia      Active               

 

                    Peritoneal Neoplasm, Malignant    Active               

 

                    Anemia, Pernicious    Active               

 

                    Arthritis unspecified    Active               

 

                    B12 deficiency      Active               







Vital Signs







      Date    Time    BP-Sys(mm[Hg]    BP-Lynn(mm[Hg])    HR(bpm)    RR(rpm)    Temp    WT    HT    HC    BMI

                    BSA                 BMI Percentile      O2 Sat(%)

 

        2017    11:07:00 AM    124 mmHg    64 mmHg    62 bpm    17 rpm    98.2 F    181.2 lbs    69

 in                       26.76 kg/m2    2.00 m2                   98 %

 

        1/15/2017    3:34:00 PM    148 mmHg    89 mmHg    69 bpm    20 rpm    98.2 F    179 lbs    69 in

                          26.4334 kg/m    1.9882 m                 98 %

 

       2017    1:51:00 PM    160 mmHg    90 mmHg    100 bpm    20 rpm    96.5 F    179 lbs             

                                                                98 %

 

       2016    3:11:00 PM    134 mmHg    76 mmHg    80 bpm    20 rpm    98 F    163 lbs    69 in     

                24.0706 kg/m    1.8972 m                      98 %

 

        2016    2:04:00 PM    142 mmHg    86 mmHg    68 bpm    16 rpm    98.5 F    166 lbs    63 in

                          29.41 kg/m2    1.83 m2                   100 %

 

        2016    11:27:00 AM    148 mmHg    78 mmHg    90 bpm    20 rpm    98.2 F    153 lbs    69 in

                          22.5939 kg/m    1.8381 m                 96 %

 

        12/3/2015    9:50:00 AM    132 mmHg    70 mmHg    62 bpm    16 rpm    97.9 F    145 lbs    69 in

                          21.41 kg/m2    1.79 m2                   100 %

 

        2015    8:52:00 AM    132 mmHg    68 mmHg    52 bpm    20 rpm    97.8 F    141 lbs    69 in

                          20.8218 kg/m    1.7645 m                 100 %

 

        2015    3:25:00 PM    120 mmHg    62 mmHg    72 bpm    16 rpm    98.1 F    136 lbs    69 in

                          20.08 kg/m2    1.73 m2                   98 %

 

       3/23/2015    2:55:00 PM    130 mmHg    76 mmHg    68 bpm    18 rpm    97 F    140 lbs    69 in    

                20.6742 kg/m    1.7583 m                      98 %

 

        10/16/2014    11:11:00 AM    120 mmHg    66 mmHg    77 bpm    20 rpm    98 F    130 lbs    69 in

                          19.20 kg/m2    1.69 m2                   100 %

 

        2014    3:21:00 PM    130 mmHg    66 mmHg    63 bpm    18 rpm    97.2 F    160 lbs    69 in

                          23.6276 kg/m    1.8797 m                 99 %

 

        2013    10:35:00 AM    132 mmHg    70 mmHg    66 bpm    20 rpm    98.1 F    157 lbs    69 in

                          23.18 kg/m2    1.86 m2                    

 

        2013    1:29:00 PM    132 mmHg    70 mmHg    76 bpm    18 rpm    98.2 F    166 lbs    69 in 

                          24.5137 kg/m    1.9146 m                  

 

       2013    2:46:00 PM    128 mmHg    70 mmHg    76 bpm    16 rpm    98 F    160 lbs    69 in     

                23.63 kg/m2     1.88 m2                          

 

        2011    8:49:00 AM    128 mmHg    78 mmHg    70 bpm    18 rpm    97.9 F    164 lbs    69 in

                          24.2183 kg/m    1.903 m                  

 

     2011    1:31:00 PM    132 mmHg    68 mmHg    84 bpm         97 F    167 lbs                        

                                         

 

        2011    9:09:00 AM    128 mmHg    70 mmHg    72 bpm    18 rpm    98.2 F    163 lbs    64 in 

                          27.9786 kg/m    1.8272 m                  

 

       2011    10:01:00 AM    132 mmHg    70 mmHg    72 bpm    18 rpm    98.2 F    154 lbs             

                                                                 

 

       2011    2:47:00 PM    128 mmHg    70 mmHg    72 bpm    18 rpm    97.8 F    156 lbs             

                                                                 

 

       5/10/2011    3:16:00 PM    144 mmHg    80 mmHg    72 bpm    18 rpm    98.2 F    158 lbs             

                                                                 

 

        2011    10:11:00 AM    132 mmHg    70 mmHg    70 bpm    18 rpm    98.2 F    168 lbs    69 in

                          24.809 kg/m    1.9261 m                  

 

        4/15/2011    10:52:00 AM    110 mmHg    60 mmHg    75 bpm    16 rpm    97.5 F    172.375 lbs    

69 in                     25.46 kg/m2    1.95 m2                   100 %

 

        2011    11:43:00 AM    120 mmHg    82 mmHg    75 bpm    16 rpm    97.2 F    178.5 lbs    69

 in                       26.3596 kg/m    1.9854 m                 100 %

 

        10/15/2010    1:32:00 PM    120 mmHg    70 mmHg    80 bpm    18 rpm    96.6 F    177 lbs    69 in

                          26.14 kg/m2    1.98 m2                   100 %

 

        2010    3:50:00 PM    168 mmHg    100 mmHg    82 bpm    18 rpm    97.8 F    177.5 lbs    69

 in                       26.2119 kg/m    1.9798 m                 97 %

 

        2010    1:21:00 PM    140 mmHg    80 mmHg    59 bpm    16 rpm    97.6 F    173.25 lbs    69 

in                        25.58 kg/m2    1.96 m2                   100 %

 

        2010    3:02:00 PM    140 mmHg    80 mmHg    61 bpm    16 rpm    97.6 F    173.125 lbs    69

 in                       25.5658 kg/m    1.9553 m                 99 %

 

        2010    1:23:00 PM    130 mmHg    80 mmHg    66 bpm    16 rpm    96.8 F    173 lbs    69 in 

                          25.55 kg/m2    1.95 m2                   100 %

 

        2010    12:58:00 PM    130 mmHg    88 mmHg    75 bpm    16 rpm    98.4 F    172.25 lbs    69

 in                       25.4366 kg/m    1.9503 m                 100 %







Social History







                    Name                Description         Comments

 

                    denies alcohol use                         

 

                    denies smoking                           

 

                    Denies illicit substance abuse                         

 

                    retired                                 direct care

 

                    Single                                   

 

                    Exercises regularly                         

 

                    Attended some college                         

 

                    Tobacco             Never smoker         







History of Procedures







                    Date Ordered        Description         Order Status

 

                    2010 12:00 AM    COMPREHEN METABOLIC PANEL    Reviewed

 

                    2010 12:00 AM    COMPLETE CBC W/AUTO DIFF WBC    Reviewed

 

                    2010 12:00 AM    LIPID PANEL         Reviewed

 

                          2015 12:00 AM        B12 Injection, Up to 1000 Mcg NDC#7038-7025-24 [facility] 

                                        Reviewed

 

                    2011 12:00 AM    MAMMOGRAM SCREENING    Reviewed

 

                    2011 12:00 AM    CYTOPATH C/V THIN LAYER    Reviewed

 

                    2011 12:00 AM    B12 Injection 1 cc NDC#[address]-70    Reviewed

 

                    2015 12:00 AM    THER/PROPH/DIAG INJ SC/IM    Reviewed

 

                    2015 12:00 AM    B12 Injection, Up to 1000 Mcg NDC#6730-1142-84    Reviewed

 

                    2011 12:00 AM    THER/PROPH/DIAG INJ SC/IM    Reviewed

 

                    2011 12:00 AM    B12 Injection([place]) Ndc#0965-8619-59-    Reviewed

 

                    2015 12:00 AM    THER/PROPH/DIAG INJ SC/IM    Reviewed

 

                    2015 12:00 AM    B12 Injection, Up to 1000 Mcg NDC#2524-7833-00    Reviewed

 

                    10/20/2011 12:00 AM    THER/PROPH/DIAG INJ SC/IM    Reviewed

 

                    10/20/2011 12:00 AM    B12 Injection([place]) Ndc#8319-5956-97-    Reviewed

 

                    2016 12:00 AM    THER/PROPH/DIAG INJ SC/IM    Reviewed

 

                    2016 12:00 AM    B12 Injection, Up to 1000 Mcg NDC#8602-6113-81    Reviewed

 

                    3/14/2016 12:00 AM    VITAMIN B-12        Reviewed

 

                    3/15/2016 12:00 AM    THER/PROPH/DIAG INJ SC/IM    Reviewed

 

                    3/15/2016 12:00 AM    B12 Injection, Up to 1000 Mcg NDC#6116-8730-27    Reviewed

 

                    2011 12:00 AM    ***Immunization administration, Medicare flu    Reviewed

 

                    2011 12:00 AM    Fluzone ** MEDICARE Only **    Reviewed

 

                    2011 12:00 AM    THER/PROPH/DIAG INJ SC/IM    Reviewed

 

                    2011 12:00 AM    B12 Injection ([employer] [facility]) Ndc#8461-1669-44    Reviewed

 

                    2016 12:00 AM    B12 Injection, Up to 1000 Mcg NDC#3240-3662-91 [facility]    

Reviewed

 

                    2016 12:00 AM    TTE W/DOPPLER COMPLETE    Reviewed

 

                    2016 12:00 AM    EXTREMITY STUDY     Returned

 

                          2016 12:00 AM        B12 Injection, Up to 1000 Mcg NDC#1421-0399-55 [facility] 

                                        Reviewed

 

                    2016 12:00 AM    THER/PROPH/DIAG INJ SC/IM    Reviewed

 

                    2012 12:00 AM    THER/PROPH/DIAG INJ SC/IM    Reviewed

 

                    2012 12:00 AM    B12 Injection ([employer]) Ndc#1339-8857-44    Reviewed

 

                    2016 12:00 AM    THER/PROPH/DIAG INJ SC/IM    Reviewed

 

                    2016 12:00 AM    B12 Injection, Up to 1000 Mcg NDC#0334-1567-13    Reviewed

 

                    2012 12:00 AM    THER/PROPH/DIAG INJ SC/IM    Reviewed

 

                    2012 12:00 AM    B12 Injection([place]) Ndc#3406-1232-42-    Reviewed

 

                    12/15/2016 12:00 AM    B12 Injection, Up to 1000 Mcg NDC#4093-0563-20    Reviewed

 

                    12/15/2016 12:00 AM    THER/PROPH/DIAG INJ SC/IM    Reviewed

 

                    2016 12:00 AM    URNLS DIP STICK/TABLET RGNT AUTO W/O MICROSCOPY    Returned

 

                    1/3/2017 12:00 AM    URNLS DIP STICK/TABLET RGNT AUTO W/O MICROSCOPY    Returned

 

                    2017 12:00 AM    URINE CULTURE/COLONY COUNT    Returned

 

                    2017 12:00 AM    Rocephin 1 gram Injection, [facility] Medicare    Reviewed

 

                    2017 12:00 AM    THERAPEUTIC PROPHYLACTIC/DX INJECTION SUBQ/IM    Reviewed

 

                    2017 12:00 AM    B12 1000mcg Injection, [facility]    Reviewed

 

                    5/3/2012 12:00 AM    THER/PROPH/DIAG INJ SC/IM    Reviewed

 

                    5/3/2012 12:00 AM    B12 Injection([place]) Ndc#7950-9304-46-    Reviewed

 

                    2012 12:00 AM    IMMUNOTHERAPY INJECTIONS    Reviewed

 

                    2012 12:00 AM    B12 Injection([place]) Ndc#2522-9905-75-    Reviewed

 

                    2012 12:00 AM    THER/PROPH/DIAG INJ SC/IM    Reviewed

 

                    2012 12:00 AM    B12 Injection, Up to 1000 Mcg NDC#6776-2640-26    Reviewed

 

                    2012 12:00 AM    THER/PROPH/DIAG INJ SC/IM    Reviewed

 

                    2012 12:00 AM    B12 Injection, Up to 1000 Mcg NDC#5560-3550-96    Reviewed

 

                    2012 12:00 AM    THER/PROPH/DIAG INJ SC/IM    Reviewed

 

                    2012 12:00 AM    B12 Injection, Up to 1000 Mcg NDC#1622-9677-12    Reviewed

 

                    10/16/2012 12:00 AM    THER/PROPH/DIAG INJ SC/IM    Reviewed

 

                    10/16/2012 12:00 AM    B12 Injection, Up to 1000 Mcg NDC#4158-5312-61    Reviewed

 

                    2010 12:00 AM    COMPREHEN METABOLIC PANEL    Reviewed

 

                    2010 12:00 AM    COMPLETE CBC W/AUTO DIFF WBC    Reviewed

 

                    2010 12:00 AM    LIPID PANEL         Reviewed

 

                    2013 12:00 AM    Flu Injection 3 Years And Above NDC# [address]-19  [facility]    Reviewed



 

                    2013 12:00 AM    COMPLETE CBC W/AUTO DIFF WBC    Reviewed

 

                    2013 12:00 AM    ASSAY OF LITHIUM    Reviewed

 

                    2013 12:00 AM    METABOLIC PANEL TOTAL CA    Reviewed

 

                    4/3/2013 12:00 AM    THER/PROPH/DIAG INJ SC/IM    Reviewed

 

                    4/3/2013 12:00 AM    B12 Injection, Up to 1000 Mcg NDC#6368-9998-53    Reviewed

 

                    2013 12:00 AM    THER/PROPH/DIAG INJ SC/IM    Reviewed

 

                    2013 12:00 AM    B12 Injection, Up to 1000 Mcg NDC#2398-0705-20    Reviewed

 

                    2013 12:00 AM    THER/PROPH/DIAG INJ SC/IM    Reviewed

 

                    2013 12:00 AM    B12 Injection, Up to 1000 Mcg NDC#3511-3903-49    Reviewed

 

                    2013 12:00 AM    LIPID PANEL         Reviewed

 

                    2013 12:00 AM    VITAMIN D 25 HYDROXY    Reviewed

 

                    2013 12:00 AM    THER/PROPH/DIAG INJ SC/IM    Reviewed

 

                    3/6/2014 12:00 AM    THER/PROPH/DIAG INJ SC/IM    Reviewed

 

                    2014 12:00 AM    THER/PROPH/DIAG INJ SC/IM    Reviewed

 

                    2014 12:00 AM    B12 Injection, Up to 1000 Mcg NDC#5203-2181-26    Reviewed

 

                    2010 12:00 AM    SKIN FUNGI CULTURE    Reviewed

 

                    10/9/2010 12:00 AM    COMPREHEN METABOLIC PANEL    Reviewed

 

                    10/9/2010 12:00 AM    LIPID PANEL         Reviewed

 

                    2010 12:00 AM    THER/PROPH/DIAG INJ SC/IM    Reviewed

 

                    2010 12:00 AM    B12 Injection Ndc#[address]-17 ([facility])    Reviewed

 

                    2010 12:00 AM    THER/PROPH/DIAG INJ SC/IM    Reviewed

 

                    2010 12:00 AM    Kenalog 40 Mg Im-Ndc#[address]-10 ([facility])    Reviewed

 

                    10/15/2010 12:00 AM    FLU VACCINE 3 YRS & > IM    Reviewed

 

                    10/15/2010 12:00 AM    Admin.Of M/C Cov.Vaccine-Flu Vacc.    Reviewed

 

                    1/15/2011 12:00 AM    COMPLETE CBC W/AUTO DIFF WBC    Reviewed

 

                    1/15/2011 12:00 AM    COMPREHEN METABOLIC PANEL    Reviewed

 

                    1/15/2011 12:00 AM    LIPID PANEL         Reviewed

 

                    2014 12:00 AM    MAMMOGRAM SCREENING    Reviewed

 

                    2014 12:00 AM    Screening mammography, bilateral    Reviewed

 

                    7/10/2014 12:00 AM    THER/PROPH/DIAG INJ SC/IM    Reviewed

 

                    7/10/2014 12:00 AM    B12 Injection, Up to 1000 Mcg NDC#5398-2222-34    Reviewed

 

                    2011 12:00 AM    COMPLETE CBC W/AUTO DIFF WBC    Reviewed

 

                    2011 12:00 AM    COMPREHEN METABOLIC PANEL    Reviewed

 

                    2011 12:00 AM    LIPID PANEL         Reviewed

 

                    2014 12:00 AM    B12 Injection, Up to 1000 Mcg NDC#2062-4065-15    Reviewed

 

                    10/19/2014 12:00 AM    MAMMOGRAM SCREENING    Reviewed

 

                    10/19/2014 12:00 AM    Screening mammography, bilateral    Reviewed

 

                    10/16/2014 12:00 AM    COMPLETE CBC W/AUTO DIFF WBC    Reviewed

 

                    10/16/2014 12:00 AM    COMPREHEN METABOLIC PANEL    Reviewed

 

                    10/16/2014 12:00 AM    IMMUNOASSAY TUMOR     Reviewed

 

                    10/16/2014 12:00 AM    LIPID PANEL         Reviewed

 

                    10/16/2014 12:00 AM    ASSAY OF LITHIUM    Reviewed

 

                    10/16/2014 12:00 AM    MAMMOGRAM SCREENING    Reviewed

 

                    2011 12:00 AM    ASSAY OF PARATHORMONE    Reviewed

 

                    2011 12:00 AM    VITAMIN D 25 HYDROXY    Reviewed

 

                    2011 12:00 AM    ASSAY OF LITHIUM    Reviewed

 

                    2011 12:00 AM    METABOLIC PANEL TOTAL CA    Reviewed

 

                    2011 12:00 AM    CT HEAD/BRAIN W/O & W/DYE    Reviewed

 

                    3/23/2015 12:00 AM    PNEUMOCOCCAL VACC 13 GLENDY IM    Reviewed

 

                    3/23/2015 12:00 AM    Vitamin B12 injection    Reviewed

 

                    2011 12:00 AM    ASSAY OF LITHIUM    Reviewed

 

                    2011 12:00 AM    B12 Injection Ndc#[address]-89  [facility]    Reviewed

 

                    2015 12:00 AM    THER/PROPH/DIAG INJ SC/IM    Reviewed

 

                    2015 12:00 AM    B12 Injection, Up to 1000 Mcg NDC#6736-6151-32    Reviewed

 

                    2015 12:00 AM    COMPLETE CBC W/AUTO DIFF WBC    Reviewed

 

                    2015 12:00 AM    COMPREHEN METABOLIC PANEL    Reviewed

 

                    2015 12:00 AM    LIPID PANEL         Reviewed

 

                    2015 12:00 AM    ASSAY OF LITHIUM    Reviewed

 

                    2011 12:00 AM    VIT D 1 25-DIHYDROXY    Reviewed

 

                    2011 12:00 AM    VITAMIN B-12        Reviewed

 

                    2015 12:00 AM    B12 Injection, Up to 1000 Mcg NDC#9176-7380-81    Reviewed

 

                    2015 12:00 AM    THER/PROPH/DIAG INJ SC/IM    Reviewed

 

                    2015 12:00 AM    B12 Injection, Up to 1000 Mcg NDC#1459-7324-70    Reviewed

 

                    2011 12:00 AM    THER/PROPH/DIAG INJ SC/IM    Reviewed

 

                    2011 12:00 AM    B12 Injection ([employer]) Ndc#3241-6422-76    Reviewed

 

                    2015 12:00 AM    THER/PROPH/DIAG INJ SC/IM    Reviewed

 

                    2015 12:00 AM    B12 Injection, Up to 1000 Mcg NDC#7453-1293-59    Reviewed







Results Summary







                          Data and Description      Results

 

                          2004 12:00 AM        Colonoscopy-Women and Men over 50 Normal 

 

                          2008 12:00 AM         Pap Smear Declined 

 

                          10/7/2009 12:00 AM        Cholest Cry Stone Ql .0 %LDLc SerPl-mCnc 123.0 mg/dLHDLc

 SerPl-mCnc 34.0 mg/dLTrigl SerPl-mCnc 190.0 mg/dLGlucose SerPl-mCnc 78.0 mg/dL

 

                          2009 12:00 AM        Mammogram -Women over 40 Normal HIV1+2 Ab Ser Ql no risk 

 

                          2010 8:47 AM         Dexa Bone Scan Refused Aspirin reccommended Contraindication 



 

                          2010 8:48 AM         Depression Done 

 

                          2010 12:00 AM         Foot Exam-Diabetic Done 

 

                          2010 12:00 AM         Cholest Cry Stone Ql .0 %LDLc SerPl-mCnc 126.0 mg/dLGlucose

 SerPl-mCnc 102.0 mg/dL

 

                          2010 8:45 AM          TRIGLYCERIDES 122.0 mg/dLCHOLESTEROL 186.0 mg/dLHDL 36.0 mg/dLTOT

 CHOL/HDL 5.2 LDL (CALC) 126.0 mg/dLGLUCOSE 102.0 mg/dLSODIUM 143.0 
mmol/LPOTASSIUM 3.70 mmol/LCHLORIDE 111.0 mmol/LCO2 23.0 mmol/LBUN 10.0 
mg/dLCREATININE 0.80 mg/dLSGOT/AST 12.0 IU/LSGPT/ALT 11.0 IU/LALK PHOS 65.0 
IU/LTOTAL PROTEIN 7.20 g/dLALBUMIN 3.90 g/dLTOTAL BILI 0.50 mg/dLCALCIUM 10.20 
mg/dLAGE 59 GFR NonAA 73 GFR AA 88 eGFR >60 mL/min/1.73 m2eGFR AA* >60 WBC 5.7 
RBC 3.26 HGB 10.60 g/dLHCT 31.70 %MCV 97.0 fLMCH 32.50 pgMCHC 33.40 g/dLRDW SD 
47 RDW CV 13.30 %MPV 9.70 fLPLT 287 NRBC# 0.00 NRBC% 0.0 %NEUT 62.90 %%LYMP 
21.80 %%MONO 9.90 %%EOS 5.0 %%BASO 0.40 %#NEUT 3.56 #LYMP 1.23 #MONO 0.56 #EOS 
0.28 #BASO 0.02 MANUAL DIFF NOT IND 

 

                          2010 12:00 AM        Glucose SerPl-mCnc 96.0 mg/dLCholest Cry Stone Ql .0 %LDLc

 SerPl-mCnc 146.0 mg/dL

 

                          2010 8:26 AM         TRIGLYCERIDES 106.0 mg/dLCHOLESTEROL 199.0 mg/dLHDL 32.0 mg/dLTOT

 CHOL/HDL 6.2 LDL (CALC) 146.0 mg/dLGLUCOSE 96.0 mg/dLSODIUM 143.0 
mmol/LPOTASSIUM 4.0 mmol/LCHLORIDE 113.0 mmol/LCO2 24.0 mmol/LBUN 13.0 
mg/dLCREATININE 1.0 mg/dLSGOT/AST 11.0 IU/LSGPT/ALT 6.0 IU/LALK PHOS 56.0 
IU/LTOTAL PROTEIN 6.60 g/dLALBUMIN 3.80 g/dLTOTAL BILI 0.50 mg/dLCALCIUM 9.30 
mg/dLAGE 59 GFR NonAA 57 GFR AA 69 eGFR 57 eGFR AA* >60 

 

                          10/6/2010 12:00 AM        Cholest Cry Stone Ql .0 %LDLc SerPl-mCnc 111.0 mg/dLGlucose

 SerPl-mCnc 81.0 mg/dL

 

                          10/6/2010 2:45 PM         TRIGLYCERIDES 123.0 mg/dLCHOLESTEROL 178.0 mg/dLHDL 42.0 mg/dLTOT

 CHOL/HDL 4.2 LDL (CALC) 111.0 mg/dLGLUCOSE 81.0 mg/dLSODIUM 139.0 
mmol/LPOTASSIUM 4.10 mmol/LCHLORIDE 106.0 mmol/LCO2 24.0 mmol/LBUN 13.0 
mg/dLCREATININE 0.90 mg/dLSGOT/AST 13.0 IU/LSGPT/ALT 11.0 IU/LALK PHOS 61.0 
IU/LTOTAL PROTEIN 7.10 g/dLALBUMIN 3.90 g/dLTOTAL BILI 0.30 mg/dLCALCIUM 9.30 
mg/dLAGE 60 GFR NonAA 64 GFR AA 78 eGFR >60 mL/min/1.73 m2eGFR AA* >60 WBC 6.9 
RBC 3.59 HGB 11.50 g/dLHCT 35.30 %MCV 98.0 fLMCH 32.0 pgMCHC 32.60 g/dLRDW SD 46
 RDW CV 12.90 %MPV 9.90 fLPLT 311 NRBC# 0.00 NRBC% 0.0 %NEUT 64.90 %%LYMP 22.50 
%%MONO 7.20 %%EOS 5.10 %%BASO 0.30 %#NEUT 4.45 #LYMP 1.54 #MONO 0.49 #EOS 0.35 
#BASO 0.02 MANUAL DIFF NOT IND 

 

                          2011 12:00 AM         Mammogram -Women over 40 Ordered 

 

                          2011 10:25 AM        TRIGLYCERIDES 111.0 mg/dLCHOLESTEROL 195.0 mg/dLHDL 43.0 mg/dLTOT

 CHOL/HDL 4.5 LDL (CALC) 130.0 mg/dLWBC 5.3 RBC 3.76 HGB 12.0 g/dLHCT 37.80 %MCV
 101.0 fLMCH 31.90 pgMCHC 31.70 g/dLRDW SD 47 RDW CV 13.0 %MPV 9.70 fLPLT 259 
NRBC# 0.00 NRBC% 0.0 %NEUT 69.0 %%LYMP 17.60 %%MONO 8.30 %%EOS 4.70 %%BASO 0.40 
%#NEUT 3.63 #LYMP 0.93 #MONO 0.44 #EOS 0.25 #BASO 0.02 MANUAL DIFF NOT IND 
GLUCOSE 102.0 mg/dLSODIUM 146.0 mmol/LPOTASSIUM 4.20 mmol/LCHLORIDE 113.0 
mmol/LCO2 23.0 mmol/LBUN 15.0 mg/dLCREATININE 1.0 mg/dLSGOT/AST 12.0 
IU/LSGPT/ALT 17.0 IU/LALK PHOS 60.0 IU/LTOTAL PROTEIN 6.90 g/dLALBUMIN 4.20 
g/dLTOTAL BILI 0.40 mg/dLCALCIUM 9.70 mg/dLAGE 60 GFR NonAA 57 GFR AA 69 eGFR 57
 eGFR AA* >60 

 

                          2011 11:49 AM        Cholest Cry Stone Ql .0 %LDLc SerPl-mCnc 130.0 mg/dLHDLc

 SerPl-mCnc 43.0 mg/dLTrigl SerPl-mCnc 111.0 mg/dLGlucose SerPl-mCnc 102.0 mg/dL

 

                          2011 11:52 AM        Pap Smear Declined 

 

                          2011 11:28 AM        Lithium 2.080 mmol/LGLUCOSE 102.0 mg/dLSODIUM 135.0 mmol/LPOTASSIUM

 3.90 mmol/LCHLORIDE 106.0 mmol/LCO2 21.0 mmol/LBUN 12.0 mg/dLCREATININE 1.30 
mg/dLCALCIUM 10.70 mg/dLAGE 60 GFR NonAA 42 GFR AA 51 eGFR 42 eGFR AA* 51 

 

                          2011 8:58 AM          Lithium 0.690 mmol/L

 

                          2011 2:38 PM         VITAMIN B12 3483.0 pg/mL

 

                          2013 3:35 PM          WBC 5.1 RBC 3.73 HGB 11.70 g/dLHCT 36.40 %MCV 98.0 fLMCH 31.40

 pgMCHC 32.10 g/dLRDW SD 47 RDW CV 13.10 %MPV 9.80 fLPLT 224 NRBC# 0.00 NRBC% 
0.0 %NEUT 66.80 %%LYMP 19.10 %%MONO 9.0 %%EOS 4.90 %%BASO 0.20 %#NEUT 3.42 #LYMP
 0.98 #MONO 0.46 #EOS 0.25 #BASO 0.01 MANUAL DIFF NOT IND GLUCOSE 88.0 
mg/dLSODIUM 141.0 mmol/LPOTASSIUM 4.10 mmol/LCHLORIDE 110.0 mmol/LCO2 22.0 
mmol/LBUN 22.0 mg/dLCREATININE 1.10 mg/dLCALCIUM 9.80 mg/dLAGE 62 GFR NonAA 50 
GFR AA 61 eGFR 50 eGFR AA* 60 Lithium 0.760 mmol/L

 

                          2013 11:02 AM        TRIGLYCERIDES 106.0 mg/dLCHOLESTEROL 181.0 mg/dLHDL 46.0 mg/dLTOT

 CHOL/HDL 3.9 LDL (CALC) 114.0 mg/dLVITAMIN D 41.10 ng/mL

 

                          10/17/2014 10:10 AM       WBC 5.0 RBC 3.66 HGB 11.60 g/dLHCT 36.80 %.0 fLMCH 31.70

 pgMCHC 31.50 g/dLRDW SD 50 RDW CV 13.50 %MPV 10.10 fLPLT 209 NRBC# 0.00 NRBC% 
0.0 %NEUT 69.20 %%LYMP 21.0 %%MONO 6.40 %%EOS 3.20 %%BASO 0.20 %#NEUT 3.46 #LYMP
 1.05 #MONO 0.32 #EOS 0.16 #BASO 0.01 MANUAL DIFF NOT IND GLUCOSE 100.0 
mg/dLSODIUM 148.0 mmol/LPOTASSIUM 3.90 mmol/LCHLORIDE 114.0 mmol/LCO2 26.0 
mmol/LBUN 12.0 mg/dLCREATININE 1.20 mg/dLSGOT/AST 9.0 IU/LSGPT/ALT <6 IU/LALK 
PHOS 82.0 IU/LTOTAL PROTEIN 6.90 g/dLALBUMIN 4.0 g/dLTOTAL BILI 0.40 
mg/dLCALCIUM 10.50 mg/dLAGE 64 GFR NonAA 45 GFR AA 55 eGFR 45 eGFR AA* 55 
TRIGLYCERIDES 96.0 mg/dLCHOLESTEROL 155.0 mg/dLHDL 38.0 mg/dLTOT CHOL/HDL 4.1 
LDL (CALC) 98.0 mg/dLLithium 0.850 mmol/LCancer Antigen (CA) 125 8.30 U/mL

 

                          2015 10:25 AM        Lithium 0.790 mmol/LWBC 4.8 RBC 3.44 HGB 11.0 g/dLHCT 35.20 

%.0 fLMCH 32.0 pgMCHC 31.30 g/dLRDW SD 53 RDW CV 14.0 %MPV 9.30 fLPLT 210
 NRBC# 0.00 NRBC% 0.0 %NEUT 70.80 %%LYMP 17.20 %%MONO 8.10 %%EOS 3.50 %%BASO 
0.40 %#NEUT 3.41 #LYMP 0.83 #MONO 0.39 #EOS 0.17 #BASO 0.02 MANUAL DIFF NOT IND 
TRIGLYCERIDES 107.0 mg/dLCHOLESTEROL 174.0 mg/dLHDL 43.0 mg/dLTOT CHOL/HDL 4.0 
LDL (CALC) 110.0 mg/dLGLUCOSE 90.0 mg/dLSODIUM 145.0 mmol/LPOTASSIUM 3.80 
mmol/LCHLORIDE 115.0 mmol/LCO2 24.0 mmol/LBUN 17.0 mg/dLCREATININE 1.30 
mg/dLSGOT/AST 18.0 IU/LSGPT/ALT 17.0 IU/LALK PHOS 56.0 IU/LTOTAL PROTEIN 6.70 
g/dLALBUMIN 3.90 g/dLTOTAL BILI 0.40 mg/dLCALCIUM 9.80 mg/dLAGE 64 GFR NonAA 41 
GFR AA 50 eGFR 41 eGFR AA* 50 

 

                          2015 8:50 AM        WBC 5.8 RBC 3.29 HGB 10.70 g/dLHCT 34.0 %.0 fLMCH 32.50

 pgMCHC 31.50 g/dLRDW SD 52 RDW CV 13.60 %MPV 9.60 fLPLT 223 NRBC# 0.00 NRBC% 
0.0 %NEUT 69.60 %%LYMP 18.90 %%MONO 8.50 %%EOS 2.80 %%BASO 0.20 %#NEUT 4.03 
#LYMP 1.09 #MONO 0.49 #EOS 0.16 #BASO 0.01 MANUAL DIFF NOT IND Lithium 0.620 
mmol/LGLUCOSE 83.0 mg/dLSODIUM 139.0 mmol/LPOTASSIUM 3.90 mmol/LCHLORIDE 109.0 
mmol/LCO2 22.0 mmol/LBUN 19.0 mg/dLCREATININE 1.40 mg/dLSGOT/AST 19.0 
IU/LSGPT/ALT 21.0 IU/LALK PHOS 55.0 IU/LTOTAL PROTEIN 6.50 g/dLALBUMIN 3.90 
g/dLTOTAL BILI 0.50 mg/dLCALCIUM 9.60 mg/dLAGE 65 GFR NonAA 38 GFR AA 46 eGFR 38
 eGFR AA* 46 TRIGLYCERIDES 121.0 mg/dLCHOLESTEROL 192.0 mg/dLHDL 51.0 mg/dLTOT 
CHOL/HDL 3.8 .0 mg/dLFREE T4 0.79 TSH 1.210 uIU/mLHemoglobin A1c 5.40 
%Estim. Avg Glu (eAG) 108 

 

                          3/15/2016 8:08 AM         VITAMIN B12 696.0 pg/mL

 

                          3/23/2016 8:26 AM         WBC 7.0 RBC 3.61 HGB 11.80 g/dLHCT 37.70 %.0 fLMCH 32.70

 pgMCHC 31.30 g/dLRDW SD 49 RDW CV 12.50 %MPV 10.0 fLPLT 207 NRBC# 0.00 NRBC% 
0.0 %NEUT 73.60 %%LYMP 16.40 %%MONO 6.60 %%EOS 3.0 %%BASO 0.30 %#NEUT 5.15 #LYMP
 1.15 #MONO 0.46 #EOS 0.21 #BASO 0.02 MANUAL DIFF NOT IND Lithium 0.940 
mmol/LGLUCOSE 108.0 mg/dLSODIUM 143.0 mmol/LPOTASSIUM 4.30 mmol/LCHLORIDE 110.0 
mmol/LCO2 27.0 mmol/LBUN 16.0 mg/dLCREATININE 1.60 mg/dLSGOT/AST 13.0 
IU/LSGPT/ALT 7.0 IU/LALK PHOS 71.0 IU/LTOTAL PROTEIN 6.80 g/dLALBUMIN 4.0 
g/dLTOTAL BILI 0.20 mg/dLCALCIUM 10.40 mg/dLAGE 65 GFR NonAA 32 GFR AA 39 eGFR 
32 eGFR AA* 39 TRIGLYCERIDES 113.0 mg/dLCHOLESTEROL 169.0 mg/dLHDL 42.0 mg/dLTOT
 CHOL/HDL 4.0 LDL (CALC) 104.0 mg/dLFREE T4 0.86 TSH 2.20 uIU/mLHemoglobin A1c 
5.20 %Estim. Avg Glu (eAG) 103 

 

                          3/25/2016 9:17 AM         COLOR YELLOW APPEARANCE CLEAR SPEC GRAV 1.010 pH 7.0 PROTEIN 

NEGATIVE GLUCOSE NEGATIVE mg/dLKETONE NEGATIVE BILIRUBIN NEGATIVE BLOOD NEGATIVE
 NITRITE NEGATIVE [place] SCREEN SMALL MICRO IND? SEE BELOW WBC/HPF 0-5 RBC/HPF 
NEGATIVE CASTS/LPF NEGATIVE /LPFCRYSTALS NEGATIVE MUCOUS THRDS NEGATIVE BACTERIA
 NEGATIVE EPITH CELLS FEW SQUAMOUS /HPFTRICHOMONAS NEGATIVE YEAST NEGATIVE 

 

                          2016 6:00 AM        GLUCOSE 91.0 mg/dLSODIUM 143.0 mmol/LPOTASSIUM 3.60 mmol/LCHLORIDE

 112.0 mmol/LCO2 23.0 mmol/LBUN 22.0 mg/dLCREATININE 1.20 mg/dLSGOT/AST 15.0 
IU/LSGPT/ALT 12.0 IU/LALK PHOS 61.0 IU/LTOTAL PROTEIN 5.40 g/dLALBUMIN 3.10 
g/dLTOTAL BILI 0.40 mg/dLCALCIUM 8.40 mg/dLAGE 66 GFR NonAA 45 GFR AA 55 eGFR 45
 eGFR AA* 55 WBC 3.0 RBC 3.05 HGB 9.80 g/dLHCT 32.10 %.0 fLMCH 32.10 
pgMCHC 30.50 g/dLRDW SD 54 RDW CV 14.20 %MPV 10.10 fLPLT 170 NRBC# 0.00 NRBC% 
0.0 %NEUT 50.70 %%LYMP 32.60 %%MONO 10.50 %%EOS 5.90 %%BASO 0.30 %#NEUT 1.54 
#LYMP 0.99 #MONO 0.32 #EOS 0.18 #BASO 0.01 MANUAL DIFF NOT IND 

 

                          2016 2:09 PM        COLOR YELLOW APPEARANCE CLEAR SPEC GRAV 1.010 pH 5.0 PROTEIN

 30 GLUCOSE NEGATIVE mg/dLKETONE NEGATIVE BILIRUBIN NEGATIVE BLOOD LARGE NITRITE
 NEGATIVE [facility] SCREEN MODERATE MICRO INDICATED? SEE BELOW WBC/HPF  RBC/HPF
 20-50 CASTS/LPF NEGATIVE /LPFCRYSTALS NEGATIVE MUCOUS THRDS NEGATIVE BACTERIA 
NEGATIVE EPITH CELLS FEW SQUAMOUS /HPFTRICHOMONAS NEGATIVE YEAST NEGATIVE CULT 
SET UP? YES 

 

                          1/3/2017 4:08 PM          COLOR YELLOW APPEARANCE HAZY SPEC GRAV 1.015 pH 6.0 PROTEIN 30

 GLUCOSE NEGATIVE mg/dLKETONE NEGATIVE BILIRUBIN NEGATIVE BLOOD MODERATE NITRITE
 NEGATIVE [facility] SCREEN LARGE MICRO INDICATED? SEE BELOW WBC/-200 RBC/HPF 
5-10 CASTS/LPF NEGATIVE /LPFCRYSTALS NEGATIVE MUCOUS THRDS NEGATIVE BACTERIA 
NEGATIVE EPITH CELLS 1+ SQUAMOUS /HPFTRICHOMONAS NEGATIVE YEAST NEGATIVE CULT 
SET UP? YES 







History Of Immunizations







       Name    Date Admin    Mfg Name    Mfg Code    Trade Name    Lot#    Route    Inj    Vis Given    Vis

 Pub                                    CVX

 

        Influenza    2008    Not Entered    NE      Not Entered            Not Entered    Not Entered

                    1            999

 

        X       12/19/2008    [employer]    MSD     Pneumovax 23            Intramuscular    Not Entered

                    1            999

 

           Influenza    10/15/2010    Athos Arely.    NOV        Fluvirin > 12 Years    

[id number]     Intramuscular    Left Deltoid    10/15/2010    2009    999

 

          X         3/23/2015    [facility]    [facility]       Prevnar 13    [id number]    Intramuscular

                Right Gluteous Medius    3/23/2015       2013       109







History of Past Illness







                    Name                Date of Onset       Comments

 

                    Peritoneal Neoplasm, Malignant                         

 

                    Hyperlipidemia                           

 

                    Bipolar disorder, unspecified                         

 

                    Artificial opening status; colostomy                         

 

                    B12 deficiency                           

 

                    Anemia, Pernicious                         

 

                    Arthritis unspecified                         

 

                    cervical cancer                          

 

                    Artificial opening status; colostomy    2010  1:10PM     

 

                    Bipolar disorder, unspecified    2010  1:10PM     

 

                    Hyperlipidemia      2010  1:10PM     

 

                    Anemia, Pernicious    2010  1:10PM     

 

                    Postoperative Follow-Up    2010  1:55PM     

 

                    Postoperative Follow-Up    Mar  8 2010 10:57AM     

 

                    Artificial opening status; colostomy    Mar  8 2010  1:19PM     

 

                    Peritoneal Neoplasm, Malignant    Mar  8 2010  1:19PM     

 

                    Artificial opening status; colostomy    2010  1:40PM     

 

                    Hyperlipidemia      2010  1:40PM     

 

                    Anemia, Pernicious    2010  1:40PM     

 

                    Peritoneal Neoplasm, Malignant    2010  1:40PM     

 

                    Arthritis unspecified    2010  1:40PM     

 

                    Anemia of Chronic Illness    2010  1:40PM     

 

                    Tinea corporis      2010  3:17PM     

 

                    Bipolar disorder, unspecified    2010  1:33PM     

 

                    Hyperlipidemia      2010  1:33PM     

 

                    Anemia, Pernicious    2010  1:33PM     

 

                    Peritoneal Neoplasm, Malignant    2010  1:33PM     

 

                    B12 deficiency      2010  1:33PM     

 

                    Ethmoidal Sinusitis, Acute    Sep 21 2010  3:53PM     

 

                    Wheezing            Sep 21 2010  3:53PM     

 

                    Flu                 Oct 15 2010  1:40PM     

 

                    Bipolar disorder, unspecified    Oct 15 2010  1:42PM     

 

                    Hyperlipidemia      Oct 15 2010  1:42PM     

 

                    Anemia, Pernicious    Oct 15 2010  1:42PM     

 

                    Peritoneal Neoplasm, Malignant    Oct 15 2010  1:42PM     

 

                    Bipolar disorder, unspecified    2011 12:01PM     

 

                    Hyperlipidemia      2011 12:01PM     

 

                    Anemia, Pernicious    2011 12:01PM     

 

                    Peritoneal Neoplasm, Malignant    2011 12:01PM     

 

                    Bipolar disorder, unspecified    Apr 15 2011 10:55AM     

 

                    Major Depression    2011 10:11AM     

 

                    Bipolar Disorder    2011 10:11AM     

 

                    Cancer              May 10 2011  4:16PM     

 

                    Major Depression    May 10 2011  3:16PM     

 

                    Bipolar Disorder    May 10 2011  3:16PM     

 

                    Hypercalcemia       May 23 2011  2:47PM     

 

                    Bipolar disorder, unspecified    May 23 2011  2:47PM     

 

                    Colon Cancer, Personal History    May 23 2011  2:47PM     

 

                    Bipolar Disorder    May 31 2011  4:39PM     

 

                    Depressive Disorder    2011 10:01AM     

 

                    Vitamin B12 deficiency    2011 10:01AM     

 

                    Vitamin D Deficiency    2011  5:07PM     

 

                    Anemia, Vitamin B12 Deficiency    2011  5:07PM     

 

                    B12 deficiency      2011  3:56PM     

 

                    Routine gynecological examination    Aug  4 2011  9:08AM     

 

                    Screening Examination for Breast Cancer    Aug  4 2011  9:08AM     

 

                    Tinea Corporis      Aug  4 2011  9:08AM     

 

                    Depressive Disorder    Sep 23 2011  8:47AM     

 

                    Contact Dermatitis    Sep 23 2011  8:47AM     

 

                    Anemia, Pernicious    Sep 23 2011  8:47AM     

 

                    B12 deficiency      Sep 23 2011  8:47AM     

 

                    B12 deficiency      Sep 27 2011  2:58PM     

 

                    B12 deficiency      Oct 20 2011  2:34PM     

 

                    Flu                 Dec  9 2011  3:16PM     

 

                    B12 deficiency      Dec  9 2011  3:17PM     

 

                    B12 deficiency      2012  4:52PM     

 

                    B12 deficiency      2012 11:10AM     

 

                    B12 deficiency      2012  3:37PM     

 

                    B12 deficiency      May  3 2012  4:10PM     

 

                    B12 deficiency      2012  2:54PM     

 

                    B12 deficiency      2012 11:23AM     

 

                    B12 deficiency      Aug  9 2012  2:08PM     

 

                    B12 deficiency      Sep  6 2012  4:36PM     

 

                    B12 deficiency      Oct 16 2012 10:23AM     

 

                    Flu                 Feb  2013  3:11PM     

 

                    Bipolar disorder, unspecified    Feb  2013  2:48PM     

 

                    Anemia, Pernicious    Feb  4   2:48PM     

 

                    B12 deficiency      2013  2:48PM     

 

                    Extrapyramidal abnormal movement disorder    2013  2:48PM     

 

                    B12 deficiency      Apr  3 2013 12:03PM     

 

                    Bipolar disorder, unspecified    May  7 2013  1:31PM     

 

                    Anemia, Pernicious    May  7 2013  1:31PM     

 

                    B12 deficiency      May  7 2013  1:31PM     

 

                    Extrapyramidal abnormal movement disorder    May  7 2013  1:31PM     

 

                    B12 deficiency      2013  3:42PM     

 

                    B12 deficiency      2013  1:31PM     

 

                    Hyperlipidemia      Aug  7 2013 10:37AM     

 

                    Vitamin D Deficiency    Aug  7 2013 10:37AM     

 

                    Bipolar disorder, unspecified    Aug  7 2013 10:37AM     

 

                    Anemia, Pernicious    Aug  7 2013 10:37AM     

 

                    B12 deficiency      Aug  7 2013 10:37AM     

 

                    B12 deficiency      Sep 25 2013 11:15AM     

 

                    B12 deficiency      Dec 11 2013  3:16PM     

 

                    B12 deficiency      Mar  6 2014  1:48PM     

 

                    B12 deficiency      May 21 2014  3:17PM     

 

                    Screening Examination for Breast Cancer    2014  3:23PM     

 

                    Periumbilical abdominal pain    2014  3:23PM     

 

                    B12 deficiency      Jul 10 2014  2:52PM     

 

                    Anemia, Vitamin B12 Deficiency    Aug 13 2014  4:50PM     

 

                    Bipolar disorder    Oct 16 2014 11:13AM     

 

                    Hyperlipidemia      Oct 16 2014 11:13AM     

 

                    Anemia, Pernicious    Oct 16 2014 11:13AM     

 

                    Peritoneal Neoplasm, Malignant    Oct 16 2014 11:13AM     

 

                    Screening breast examination    Oct 16 2014 11:13AM     

 

                    Weight loss         Oct 16 2014 11:13AM     

 

                    Anemia, Pernicious    Mar 23 2015  2:57PM     

 

                    B12 deficiency      Mar 23 2015  2:57PM     

 

                    Need for Prevnar vaccine    Mar 23 2015  2:57PM     

 

                    Bipolar disorder    Mar 23 2015  2:57PM     

 

                    Hyperlipidemia      Mar 23 2015  2:57PM     

 

                    Anemia, Pernicious    Mar 23 2015  2:57PM     

 

                    Peritoneal Neoplasm, Malignant    Mar 23 2015  2:57PM     

 

                    B12 deficiency      May  4 2015  4:48PM     

 

                    Hyperlipidemia      May 13 2015  9:56AM     

 

                    Anemia              May 13 2015  9:56AM     

 

                    Bipolar disorder    May 13 2015  9:56AM     

 

                    Bipolar disorder    May 14 2015  3:27PM     

 

                    Hyperlipidemia      May 14 2015  3:27PM     

 

                    Anemia, Pernicious    May 14 2015  3:27PM     

 

                    Peritoneal Neoplasm, Malignant    May 14 2015  3:27PM     

 

                    B12 deficiency      2015  2:20PM     

 

                    B12 deficiency      2015 11:34AM     

 

                    B12 deficiency      Aug 18 2015  9:06AM     

 

                    Tinea Corporis      Sep 18 2015  8:54AM     

 

                    B12 deficiency      Sep 18 2015  8:54AM     

 

                    B12 deficiency      2015 10:28AM     

 

                    Herpes zoster without complication    Dec  3 2015  9:52AM     

 

                    B12 deficiency      Dec 23 2015 11:21AM     

 

                    B12 deficiency      2016  4:51PM     

 

                    Vitamin B 12 deficiency    Mar 14 2016  5:35PM     

 

                    B12 deficiency      Mar 15 2016 12:14PM     

 

                    B12 deficiency      May  5 2016 11:30AM     

 

                    Edema               May  5 2016 11:30AM     

 

                    Dermatitis          May  5 2016 11:30AM     

 

                    Edema               May 17 2016  8:38AM     

 

                    Shortness of breath    May 17 2016  8:38AM     

 

                    Bilateral edema of lower extremity    2016  2:06PM     

 

                    B12 deficiency      2016  2:06PM     

 

                    B12 deficiency      2016 11:50AM     

 

                    B12 deficiency      2016 11:20AM     

 

                    Diarrhea            Aug  2 2016  3:13PM     

 

                    B12 deficiency      Aug 24 2016 11:10AM     

 

                    Encounter for screening mammogram for breast cancer    Aug 24 2016 11:44AM     

 

                    B12 deficiency      Sep 28 2016  2:35PM     

 

                    B12 deficiency      Dec 15 2016  2:02PM     

 

                    Dysuria             Dec 29 2016 12:14PM     

 

                    Hematuria           Fidencio  3 2017  1:33PM     

 

                    Constipation by delayed colonic transit    2017  1:52PM     

 

                    Ileus               2017  1:52PM     

 

                    UTI (urinary tract infection)    Fidencio 15 2017  3:39PM     

 

                    Acute cystitis with hematuria    2017 11:07AM     

 

                    B12 deficiency      2017 11:07AM     

 

                    B12 deficiency      2017 11:40AM     







Payers







           Insurance Name    Company Name    Plan Name    Plan Number    Policy Number    Policy Group

 Number                                 Start Date

 

                    Medicare RHC Medicare RHC              [id number]              N/A

 

                          [employer]    [employer]                 [id number]

                                                    

 

                    Medicare Part A    Medicare - Lab/Xray              [id number]              2006

 

                    Medicare Part B    Medicare [place]              [id number]              2006

 

                          [employer]    [employer]                 50 percent

                                                    2009

 

                    [employer]              [id number]              N/A

 

                    Medicare Part A    Medicare Part A              [id number]              N/A

 

                    Medicare Part A    Medicare Part A              [id number]              2006









History of Encounters







                    Visit Date          Visit Type          Provider

 

                    2017           Office visit        Radha GREEN

 

                    1/15/2017           Office visit        Aj Tapia NP

 

                    2017            Office visit        Devin Masterson MD

 

                    2016          [facility]            Devin Masterson MD

 

                    12/15/2016          Nurse visit         Bhupinder Louise DO

 

                    2016           Nurse visit         Bret Forte APRN

 

                    2016           Nurse visit         Bhupinder Aspen DO

 

                    2016            Office visit        Bhupinder Aspen DO

 

                    2016           Nurse visit         Bhupinder Aspen DO

 

                    2016           Office visit        Bret Reanna APRN

 

                    2016            Office visit        Bhupinder Aspen DO

 

                    3/15/2016           Nurse visit         Bhupinder Aspen DO

 

                    2016            Nurse visit         Bhupinder Aspen DO

 

                    2015          Nurse visit         Bhupinder Aspen DO

 

                    12/3/2015           Office visit        Bhupinder Aspen DO

 

                    2015          Nurse visit         Bhupinder Aspen DO

 

                    2015           Office visit        Bhupinder Aspen DO

 

                    2015           Nurse visit         Bhupinder Aspen DO

 

                    2015            Nurse visit         Bhupinder Aspen DO

 

                    2015            Nurse visit         Bhupinder Aspen DO

 

                    2015           Office visit        Bhupinder Aspen DO

 

                    2015            Nurse visit         Bhupinder Aspen DO

 

                    3/23/2015           Office visit        Bhupinder Aspen DO

 

                    10/16/2014          Office visit        Bhupinder Aspen DO

 

                    2014           Nurse visit         Radha Weston APRN

 

                    7/10/2014           Nurse visit         Bhupinder Aspen DO

 

                    2014           Office visit        Bhupinder Aspen DO

 

                    2014           Nurse visit         Bhupinder Aspen DO

 

                    3/6/2014            Nurse visit         Bhupinder Aspen DO

 

                    2014            [facility]            EARNEST Lopez MD

 

                    2013          Nurse visit         Bhupinder Aspen DO

 

                    2013           Nurse visit         Bhupinder Aspen DO

 

                    2013            Office visit        Bhupinder Aspen DO

 

                    2013            Nurse visit         Bhupinder Aspen DO

 

                    2013            Nurse visit         Bhupinder Aspen DO

 

                    2013            Office visit        Bhupinder Aspen DO

 

                    4/3/2013            Nurse visit         Bhupinder Aspen DO

 

                    2013            Office visit        Bhupinder Aspen DO

 

                    10/16/2012          Nurse visit         Bhupinder Aspen DO

 

                    2012            Nurse visit         Bhupinder Aspen DO

 

                    2012            Voided              Bhupinder Aspen DO

 

                    2012            Nurse visit         Bhupinder Aspen DO

 

                    2012            Nurse visit         Bhupinder Aspen DO

 

                    2012           Nurse visit         Bhupinder Aspen DO

 

                    5/3/2012            Nurse visit         Bhupinder Aspen DO

 

                    2012           Nurse visit         Bhupinder Aspen DO

 

                    2012           Nurse visit         Bhupinder Aspen DO

 

                    2012           Nurse visit         Bhupinder Aspen DO

 

                    2011           Nurse visit         Bhupinder Aspen DO

 

                    10/20/2011          Nurse visit         Bhupinder Aspen DO

 

                    2011           Office visit        Bhupinder Aspen DO

 

                    2011           Nurse visit         Radha GREEN

 

                    2011            Office visit        Bhupinder Aspen DO

 

                    2011           Nurse visit         Bhupinder Aspen DO

 

                    2011            Office visit        Bhupinder Aspen DO

 

                    2011           Office visit        Bhupinder Aspen DO

 

                    5/10/2011           Office visit        Bhupinder Aspen DO

 

                    2011           Office visit        Bhupinder Aspen DO

 

                    4/15/2011           Office visit        Devin Angel DO

 

                    2011           Office visit        Devin Angel DO

 

                    10/15/2010          Office visit        Devin Angel DO

 

                    2010           Office visit        Devin Angel DO

 

                    2010            Office visit        Devin Angel DO

 

                    2010           Office visit        Devin Angel DO

 

                    2010            Office visit        Devin Angel DO

 

                    3/8/2010            Office visit        Devin Masterson MD

 

                    2010            Surgery             Devin Masterson MD

 

                    2010            Office visit        Devin Angel DO

 

                    2010           Surgery             Devin Masterson MD

 

                    2010           Hospital            Devin Masterson MD

 

                    2010           [facility]            Devin Masterson MD

 

                    10/22/2009          Office visit        Devin Angel DO

## 2019-06-26 NOTE — XMS REPORT
MU2 Ambulatory Summary

                             Created on: 2017



Pauline Gan

External Reference #: [id number]

: 1950

Sex: Female



Demographics







                          Address                   [address]

[place]  [id number]

 

                          Home Phone                [phone]

 

                          Preferred Language        English

 

                          Marital Status            Legally 

 

                          Amish Affiliation     Unknown

 

                          Race                      White

 

                          Ethnic Group              Not  or 





Author







                          Author                    Bhupinder Louise

 

                          [facility]

 

                          Address                   [address]

[place]  [id number]



 

                          Phone                     [phone]







Care Team Providers







                    Care Team Member Name    Role                Phone

 

                    Bhupinder Louise    PCP                 Unavailable

 

                    Bhupinder Louise    PreferredProvider    Unavailable







Allergies and Adverse Reactions







                    Name                Reaction            Notes

 

                    NO KNOWN DRUG ALLERGIES                         







Plan of Treatment







             Planned Activity    Comments     Planned Date    Planned Time    Plan/Goal

 

             Injection,Subcutaneous/Intramuscul, [facility]                 2017    12:00 AM      

 

             [facility]                       2017    12:00 AM      







Medications







                                        Active 

 

             Name         Start Date    Estimated Completion Date    SIG          Comments

 

                Latuda 20 mg oral tablet                                    take 1 tablet (20 mg) by oral route once daily with

 food (at least 350 calories)            

 

             pravastatin 40 mg oral tablet    3/30/2015                 TAKE 1 TABLET BY MOUTH DAILY     

 

                Namenda XR 28 mg oral capsule,sprinkle,ER 24hr    2015                       take 1 capsule (28

 mg) by oral route once daily            

 

                Namenda XR 28 mg oral capsule,sprinkle,ER 24hr    2016                       take 1 capsule (28

 mg) by oral route once daily            

 

                potassium chloride 10 mEq oral tablet extended release    2016                       take 1 tablet

 (10 meq) by oral route once daily       

 

             pravastatin 40 mg oral tablet    2016                 TAKE 1 TABLET BY MOUTH DAILY     

 

                Vitamin B-12 1,000 mcg/mL injection solution    2016                       inject 1 milliliter 

(1,000 mcg) by intramuscular route once a month     

 

                potassium chloride 10 mEq oral tablet extended release    2016                      take 1 tablet

 (10 meq) by oral route once daily       

 

                Namenda XR 28 mg oral capsule,sprinkle,ER 24hr    2016                      TAKE 1 CAPSULE BY

 MOUTH EVERY DAY                         

 

                furosemide 40 mg oral tablet    2016                      take 1 tablet (40 mg) by oral route

 once daily                              

 

                mirtazapine 45 mg oral tablet                                    take 1 tablet (45 mg) by oral route once daily

 before bedtime                          

 

             Fish Oil 300-1,000 mg oral capsule                              take 1 capsule by oral route daily     

 

             Vitamin D3 1,000 unit oral tablet                              take 1 tablet by oral route daily     

 

                Calcium 600 600 mg calcium (1,500 mg) oral tablet                                    take 1 tablet by oral route

 daily                                   

 

                Aspirin Low Dose 81 mg oral tablet,delayed release (DR/EC)                                    take 1 tablet 

(81 mg) by oral route once daily         

 

                metoclopramide HCl 10 mg oral tablet    2017                       take 1 tablet by oral route 

2 times a day for 50 days                

 

             furosemide 40 mg oral tablet    2017                 TAKE 1 TABLET BY MOUTH DAILY     

 

                Namenda XR 28 mg oral capsule,sprinkle,ER 24hr    2017                       TAKE 1 CAPSULE BY 

MOUTH EVERY DAY                          

 

                potassium chloride 10 mEq oral tablet extended release    2017                       TAKE 1 TABLET

 BY MOUTH DAILY                          

 

                Linzess 72 mcg oral capsule    2017                       take 1 capsule (72 mcg) by oral route

 once daily on an empty stomach at least 30 minutes before 1st meal of the day     



 

             pravastatin 40 mg oral tablet    2017                 TAKE 1 TABLET BY MOUTH DAILY     









                                         

 

             Name         Start Date    Expiration Date    SIG          Comments

 

             Reglan 10mg    3/29/2010    2010    one ac and hs     

 

                Keflex 500 mg oral capsule    2010       10/1/2010       take 1 capsule (500 mg) by oral

 route every 6 hours for 10 days         

 

                Bactrim -160 mg oral tablet    2011       take 1 tablet by oral route

 every 12 hours for 7 days               

 

                triamcinolone acetonide 0.1 % topical cream    2011      apply a thin

 layer to the affected area(s) by topical route 2 times per day     

 

                sertraline 100 mg oral tablet    4/10/2012       5/10/2012       take 1.5 tablets by oral route

 daily for 30 days                       

 

                ergocalciferol (vitamin D2) 50,000 unit oral capsule    4/15/2013       2013       TAKE

 ONE CAPSULE BY MOUTH ONCE A WEEK        

 

                CYANOCOBALAM 1000MCGINJ 1000 milliliter    2013       INJECT 1ML INTRAMUSCULAR

 ONCE A MONTH                            

 

                pravastatin 40 mg oral tablet    3/25/2014       3/20/2015       TAKE ONE TABLET BY MOUTH EVERY

 DAY                                     

 

                          Zostavax (PF) 19,400 unit/0.65 mL subcutaneous suspension for reconstitution    3/23/2015

                    3/24/2015           inject 0.65 milliliter by subcutaneous route once     

 

                famciclovir 500 mg oral tablet    12/3/2015       12/10/2015      take 1 tablet (500 mg) by

 oral route every 8 hours for 7 days     

 

                furosemide 40 mg oral tablet    2016      take 1 tablet (40 mg) by oral

 route once daily                        

 

                Cipro 500 mg oral tablet    2016       take 1 tablet (500 mg) by oral route

 2 times per day for 5 days              

 

                Bactrim -160 mg oral tablet    2016        take 1 tablet by oral route

 every 12 hours for 7 days               

 

                metoclopramide HCl 10 mg oral tablet    2017       take 1 tablet by oral

 route 2 times a day for 50 days         

 

                Macrobid 100 mg oral capsule    2017       take 1 capsule (100 mg) by oral

 route 2 times per day with food for 7 days     

 

                Augmentin 875-125 mg oral tablet    2017       take 1 tablet by oral route

 every 12 hours for 7 days               

 

                amoxicillin 500 mg oral tablet    2017       take 1 tablet (500 mg) by oral

 route every 12 hours for 7 days         

 

                cefuroxime axetil 500 mg oral tablet    2017       take 1 tablet (500 mg)

 by oral route 2 times per day for 7 days     

 

                ciprofloxacin HCl 500 mg oral tablet    2017       take 1 tablet (500 mg)

 by oral route every 12 hours for 5 days     









                                        Discontinued 

 

             Name         Start Date    Discontinued Date    SIG          Comments

 

                Tylenol 325 mg oral tablet                    2013        take 1 - 2 tablets (325 -650 mg) by oral

 route every 4-6 hours as needed         

 

                Calcium 600 + D(3) 600 mg(1,500mg) -400 unit oral tablet                    2011       take 1 tablet

 by oral route 2 times a day            no longer taking

 

                Vitamin B-12 1,000 mcg oral tablet extended release    2010       take 1

 tablet by oral route daily             no longer taking

 

                Antifungal (clotrimazole) 1 % topical cream    2010       apply to the 

affected and surrounding areas of skin by topical route 2 times per day morning 
and evening                              

 

                sertraline 100 mg oral tablet    5/10/2011       2011       take 2 tablets (200 mg) by 

oral route once daily                   discontinued by Dr. Serrano

 

                mirtazapine 15 mg oral tablet                    2011        take 1 tablet (15 mg) by oral route 

once daily before bedtime               Dr. Serrano

 

                mirtazapine 15 mg oral tablet                    2011        take 1 tablet (15 mg) by oral route 

once daily before bedtime               dc'd by Dr. Serrano

 

                Pristiq 50 mg oral tablet extended release 24 hr                    2013        take 1 tablet (50

 mg) by oral route once daily           Dr. Serrano

 

                Pristiq 50 mg oral tablet extended release 24 hr                    2013        take 1 tablet (50

 mg) by oral route once daily           dose updated

 

                Vitamin B-12 1,000 mcg/mL injection solution    2011        inject 1 milliliter

 (1,000 mcg) by intramuscular route once a month    on list already

 

                    syringe with needle 1 mL 25 gauge x 1" miscellaneous syringe    2011

                          use for injection once a month     

 

                clotrimazole 1 % topical cream    2011        apply to the affected and surrounding

 areas of skin by topical route 2 times per day in the morning and evening     

 

                Vitamin D2 50,000 unit oral capsule    2011        take 1 capsule (50,000

 unit) by oral route once weekly        generic on list

 

                Pravachol 40 mg oral tablet    2012        take 1 tablet (40 mg) by oral 

route once daily for 90 days            generic on list

 

                lithium carbonate 300 mg oral capsule    2012        take 1 capsule by oral

 route daily                            dose updated

 

                Pristiq 100 mg oral tablet extended release 24 hr                    4/10/2012       take 1 and 1/2 

tablet (150 mg) by oral route once daily    Mental Health provider

 

                Pristiq 100 mg oral tablet extended release 24 hr                    4/10/2012       take 1 and 1/2 

tablet (150 mg) by oral route once daily    Discontinued by Dr [facility]frain [facility] at [facility]

 

                hydroxyzine HCl 50 mg oral tablet    10/16/2014      2015       take 1 tablet (50 mg) 

by oral route at bedtime                 

 

                lithium carbonate 300 mg oral capsule    2015       take 1 capsule (300

 mg) by oral route 2 for 30 days         

 

                fluconazole 100 mg oral tablet    2015       12/3/2015       take 1 tablet (100 mg) by 

oral route once a week                   

 

                ketoconazole 2 % topical cream    2015       12/3/2015       apply to the affected area(s)

 by topical route 2 times per day        

 

                prednisone 10 mg oral tablet    12/3/2015       2016        take 2 tablets (20 mg) by oral

 route once daily for 4 days 1 tablet daily for 4 days 0.5 tablet daily for 4 
days                                     

 

                triamcinolone acetonide 0.1 % topical cream    2016       apply a thin layer

 to the affected area(s) by topical route 2 times per day     

 

                Cipro 500 mg oral tablet    1/15/2017       2017       take 1 tablet (500 mg) by oral route

 every 12 hours for 10 days              







Problem List







                    Description         Status              Onset

 

                    Artificial opening status; colostomy    Active               

 

                    Bipolar disorder, unspecified    Active               

 

                    Hyperlipidemia      Active               

 

                    Peritoneal Neoplasm, Malignant    Active               

 

                    Anemia, Pernicious    Active               

 

                    Arthritis unspecified    Active               

 

                    B12 deficiency      Active               







Vital Signs







      Date    Time    BP-Sys(mm[Hg]    BP-Lynn(mm[Hg])    HR(bpm)    RR(rpm)    Temp    WT    HT    HC    BMI

                    BSA                 BMI Percentile      O2 Sat(%)

 

        2017    1:34:00 PM    118 mmHg    62 mmHg    122 bpm    18 rpm    97.8 F    161.375 lbs    

69 in                     23.83 kg/m2    1.89 m2                   96 %

 

        2017    3:05:00 PM    134 mmHg    70 mmHg    70 bpm    20 rpm    97.4 F    181 lbs    69 in

                          26.7288 kg/m    1.9992 m                 98 %

 

        2017    11:07:00 AM    124 mmHg    64 mmHg    62 bpm    17 rpm    98.2 F    181.2 lbs    69

 in                       26.76 kg/m2    2.00 m2                   98 %

 

        1/15/2017    3:34:00 PM    148 mmHg    89 mmHg    69 bpm    20 rpm    98.2 F    179 lbs    69 in

                          26.4334 kg/m    1.9882 m                 98 %

 

       2017    1:51:00 PM    160 mmHg    90 mmHg    100 bpm    20 rpm    96.5 F    179 lbs             

                                                                98 %

 

       2016    3:11:00 PM    134 mmHg    76 mmHg    80 bpm    20 rpm    98 F    163 lbs    69 in     

                24.0706 kg/m    1.8972 m                      98 %

 

        2016    2:04:00 PM    142 mmHg    86 mmHg    68 bpm    16 rpm    98.5 F    166 lbs    63 in

                          29.41 kg/m2    1.83 m2                   100 %

 

        2016    11:27:00 AM    148 mmHg    78 mmHg    90 bpm    20 rpm    98.2 F    153 lbs    69 in

                          22.5939 kg/m    1.8381 m                 96 %

 

        12/3/2015    9:50:00 AM    132 mmHg    70 mmHg    62 bpm    16 rpm    97.9 F    145 lbs    69 in

                          21.41 kg/m2    1.79 m2                   100 %

 

        2015    8:52:00 AM    132 mmHg    68 mmHg    52 bpm    20 rpm    97.8 F    141 lbs    69 in

                          20.8218 kg/m    1.7645 m                 100 %

 

        2015    3:25:00 PM    120 mmHg    62 mmHg    72 bpm    16 rpm    98.1 F    136 lbs    69 in

                          20.08 kg/m2    1.73 m2                   98 %

 

       3/23/2015    2:55:00 PM    130 mmHg    76 mmHg    68 bpm    18 rpm    97 F    140 lbs    69 in    

                20.6742 kg/m    1.7583 m                      98 %

 

        10/16/2014    11:11:00 AM    120 mmHg    66 mmHg    77 bpm    20 rpm    98 F    130 lbs    69 in

                          19.20 kg/m2    1.69 m2                   100 %

 

        2014    3:21:00 PM    130 mmHg    66 mmHg    63 bpm    18 rpm    97.2 F    160 lbs    69 in

                          23.6276 kg/m    1.8797 m                 99 %

 

        2013    10:35:00 AM    132 mmHg    70 mmHg    66 bpm    20 rpm    98.1 F    157 lbs    69 in

                          23.18 kg/m2    1.86 m2                    

 

        2013    1:29:00 PM    132 mmHg    70 mmHg    76 bpm    18 rpm    98.2 F    166 lbs    69 in 

                          24.5137 kg/m    1.9146 m                  

 

       2013    2:46:00 PM    128 mmHg    70 mmHg    76 bpm    16 rpm    98 F    160 lbs    69 in     

                23.63 kg/m2     1.88 m2                          

 

        2011    8:49:00 AM    128 mmHg    78 mmHg    70 bpm    18 rpm    97.9 F    164 lbs    69 in

                          24.2183 kg/m    1.903 m                  

 

     2011    1:31:00 PM    132 mmHg    68 mmHg    84 bpm         97 F    167 lbs                        

                                         

 

        2011    9:09:00 AM    128 mmHg    70 mmHg    72 bpm    18 rpm    98.2 F    163 lbs    64 in 

                          27.9786 kg/m    1.8272 m                  

 

       2011    10:01:00 AM    132 mmHg    70 mmHg    72 bpm    18 rpm    98.2 F    154 lbs             

                                                                 

 

       2011    2:47:00 PM    128 mmHg    70 mmHg    72 bpm    18 rpm    97.8 F    156 lbs             

                                                                 

 

       5/10/2011    3:16:00 PM    144 mmHg    80 mmHg    72 bpm    18 rpm    98.2 F    158 lbs             

                                                                 

 

        2011    10:11:00 AM    132 mmHg    70 mmHg    70 bpm    18 rpm    98.2 F    168 lbs    69 in

                          24.809 kg/m    1.9261 m                  

 

        4/15/2011    10:52:00 AM    110 mmHg    60 mmHg    75 bpm    16 rpm    97.5 F    172.375 lbs    

69 in                     25.46 kg/m2    1.95 m2                   100 %

 

        2011    11:43:00 AM    120 mmHg    82 mmHg    75 bpm    16 rpm    97.2 F    178.5 lbs    69

 in                       26.3596 kg/m    1.9854 m                 100 %

 

        10/15/2010    1:32:00 PM    120 mmHg    70 mmHg    80 bpm    18 rpm    96.6 F    177 lbs    69 in

                          26.14 kg/m2    1.98 m2                   100 %

 

        2010    3:50:00 PM    168 mmHg    100 mmHg    82 bpm    18 rpm    97.8 F    177.5 lbs    69

 in                       26.2119 kg/m    1.9798 m                 97 %

 

        2010    1:21:00 PM    140 mmHg    80 mmHg    59 bpm    16 rpm    97.6 F    173.25 lbs    69 

in                        25.58 kg/m2    1.96 m2                   100 %

 

        2010    3:02:00 PM    140 mmHg    80 mmHg    61 bpm    16 rpm    97.6 F    173.125 lbs    69

 in                       25.5658 kg/m    1.9553 m                 99 %

 

        2010    1:23:00 PM    130 mmHg    80 mmHg    66 bpm    16 rpm    96.8 F    173 lbs    69 in 

                          25.55 kg/m2    1.95 m2                   100 %

 

        2010    12:58:00 PM    130 mmHg    88 mmHg    75 bpm    16 rpm    98.4 F    172.25 lbs    69

 in                       25.4366 kg/m    1.9503 m                 100 %







Social History







                    Name                Description         Comments

 

                    denies alcohol use                         

 

                    denies smoking                           

 

                    Denies illicit substance abuse                         

 

                    retired                                 direct care

 

                    Single                                   

 

                    Exercises regularly                         

 

                    Attended some college                         

 

                    Tobacco             Never smoker         







History of Procedures







                    Date Ordered        Description         Order Status

 

                    2010 12:00 AM    COMPREHEN METABOLIC PANEL    Reviewed

 

                    2010 12:00 AM    COMPLETE CBC W/AUTO DIFF WBC    Reviewed

 

                    2010 12:00 AM    LIPID PANEL         Reviewed

 

                          2015 12:00 AM        B12 Injection, Up to 1000 Mcg NDC#9394-7358-52 [facility] Medicare 

                                        Reviewed

 

                    2011 12:00 AM    MAMMOGRAM SCREENING    Reviewed

 

                    2011 12:00 AM    CYTOPATH C/V THIN LAYER    Reviewed

 

                    2011 12:00 AM    B12 Injection 1 cc NDC#[address]-19    Reviewed

 

                    2015 12:00 AM    THER/PROPH/DIAG INJ SC/IM    Reviewed

 

                    2015 12:00 AM    B12 Injection, Up to 1000 Mcg NDC#9254-2793-42    Reviewed

 

                    2011 12:00 AM    THER/PROPH/DIAG INJ SC/IM    Reviewed

 

                    2011 12:00 AM    B12 Injection([place]) Ndc#1411-1467-13-    Reviewed

 

                    2015 12:00 AM    THER/PROPH/DIAG INJ SC/IM    Reviewed

 

                    2015 12:00 AM    B12 Injection, Up to 1000 Mcg NDC#3206-1978-68    Reviewed

 

                    10/20/2011 12:00 AM    THER/PROPH/DIAG INJ SC/IM    Reviewed

 

                    10/20/2011 12:00 AM    B12 Injection([place]) Ndc#1165-7122-82-    Reviewed

 

                    2016 12:00 AM    THER/PROPH/DIAG INJ SC/IM    Reviewed

 

                    2016 12:00 AM    B12 Injection, Up to 1000 Mcg NDC#0453-4576-39    Reviewed

 

                    3/14/2016 12:00 AM    VITAMIN B-12        Reviewed

 

                    3/15/2016 12:00 AM    THER/PROPH/DIAG INJ SC/IM    Reviewed

 

                    3/15/2016 12:00 AM    B12 Injection, Up to 1000 Mcg NDC#3754-5312-95    Reviewed

 

                    2011 12:00 AM    ***Immunization administration, Medicare flu    Reviewed

 

                    2011 12:00 AM    Fluzone ** MEDICARE Only **    Reviewed

 

                    2011 12:00 AM    THER/PROPH/DIAG INJ SC/IM    Reviewed

 

                    2011 12:00 AM    B12 Injection ([employer] [facility]) Ndc#2064-6139-01    Reviewed

 

                    2016 12:00 AM    B12 Injection, Up to 1000 Mcg NDC#9477-0775-40 [facility]    

Reviewed

 

                    2016 12:00 AM    TTE W/DOPPLER COMPLETE    Reviewed

 

                    2016 12:00 AM    EXTREMITY STUDY     Reviewed

 

                          2016 12:00 AM        B12 Injection, Up to 1000 Mcg NDC#8295-2916-15 [facility] 

                                        Reviewed

 

                    2016 12:00 AM    THER/PROPH/DIAG INJ SC/IM    Reviewed

 

                    2016 12:00 AM    B12 Injection, Up to 1000 Mcg NDC#0338-3108-85    Reviewed

 

                    2016 12:00 AM    THER/PROPH/DIAG INJ SC/IM    Reviewed

 

                    2012 12:00 AM    B12 Injection([place]) Ndc#1167-8219-36-    Reviewed

 

                    2016 12:00 AM    B12 Injection, Up to 1000 Mcg NDC#3001-5166-79    Reviewed

 

                    2016 12:00 AM    THER/PROPH/DIAG INJ SC/IM    Reviewed

 

                    2012 12:00 AM    THER/PROPH/DIAG INJ SC/IM    Reviewed

 

                    2012 12:00 AM    B12 Injection ([employer]) Ndc#8399-4305-40    Reviewed

 

                    2016 12:00 AM    THER/PROPH/DIAG INJ SC/IM    Reviewed

 

                    2016 12:00 AM    B12 Injection, Up to 1000 Mcg NDC#4428-8351-42    Reviewed

 

                    2016 12:00 AM    B12 Injection, Up to 1000 Mcg NDC#9650-1819-16    Reviewed

 

                    2016 12:00 AM    THER/PROPH/DIAG INJ SC/IM    Reviewed

 

                    2012 12:00 AM    THER/PROPH/DIAG INJ SC/IM    Reviewed

 

                    2012 12:00 AM    B12 Injection([place]ella) Ndc#4678-8998-88-    Reviewed

 

                    12/15/2016 12:00 AM    B12 Injection, Up to 1000 Mcg NDC#4150-2969-48    Reviewed

 

                    12/15/2016 12:00 AM    THER/PROPH/DIAG INJ SC/IM    Reviewed

 

                    2016 12:00 AM    URNLS DIP STICK/TABLET RGNT AUTO W/O MICROSCOPY    Reviewed

 

                    1/3/2017 12:00 AM    URNLS DIP STICK/TABLET RGNT AUTO W/O MICROSCOPY    Reviewed

 

                    2017 12:00 AM    URINE CULTURE/COLONY COUNT    Reviewed

 

                    2017 12:00 AM    Rocephin 1 gram Injection, [facility]    Reviewed

 

                    2017 12:00 AM    THERAPEUTIC PROPHYLACTIC/DX INJECTION SUBQ/IM    Reviewed

 

                    2017 12:00 AM    B12 1000mcg Injection, [facility] Medicare    Reviewed

 

                    5/3/2012 12:00 AM    THER/PROPH/DIAG INJ SC/IM    Reviewed

 

                    5/3/2012 12:00 AM    B12 Injection([facility]) Ndc#9732-0571-62-    Reviewed

 

                    2017 12:00 AM    THERAPEUTIC PROPHYLACTIC/DX INJECTION SUBQ/IM    Reviewed

 

                    2017 12:00 AM    B12 1000mcg Injection, [facility] Medicare    Reviewed

 

                    2017 12:00 AM    MRI BRAIN STEM W/O & W/DYE    Reviewed

 

                    2017 12:00 AM    VITAMIN B-12        Reviewed

 

                    2017 12:00 AM    Speech Therapy Consult    Reviewed

 

                    2017 12:00 AM    ASSAY OF CREATININE    Reviewed

 

                    2012 12:00 AM    IMMUNOTHERAPY INJECTIONS    Reviewed

 

                    2012 12:00 AM    B12 Injection([facility]) Ndc#0490-5055-41-    Reviewed

 

                    2017 12:00 AM    URINALYSIS AUTO W/SCOPE    Reviewed

 

                    2012 12:00 AM    THER/PROPH/DIAG INJ SC/IM    Reviewed

 

                    2012 12:00 AM    B12 Injection, Up to 1000 Mcg NDC#1335-9500-69    Reviewed

 

                    2017 12:00 AM    URINALYSIS AUTO W/SCOPE    Reviewed

 

                    2017 2:18 PM    URINALYSIS AUTO W/O SCOPE    Reviewed

 

                    2017 12:00 AM    URINE CULTURE/COLONY COUNT    Reviewed

 

                    2017 12:00 AM    B12 1000mcg Injection    Reviewed

 

                    2017 12:00 AM    URNLS DIP STICK/TABLET RGNT AUTO W/O MICROSCOPY    Returned

 

                    2012 12:00 AM    THER/PROPH/DIAG INJ SC/IM    Reviewed

 

                    2012 12:00 AM    B12 Injection, Up to 1000 Mcg NDC#6253-2034-64    Reviewed

 

                    2012 12:00 AM    THER/PROPH/DIAG INJ SC/IM    Reviewed

 

                    2012 12:00 AM    B12 Injection, Up to 1000 Mcg NDC#6220-2804-76    Reviewed

 

                    10/16/2012 12:00 AM    THER/PROPH/DIAG INJ SC/IM    Reviewed

 

                    10/16/2012 12:00 AM    B12 Injection, Up to 1000 Mcg NDC#2963-8822-88    Reviewed

 

                    2010 12:00 AM    COMPREHEN METABOLIC PANEL    Reviewed

 

                    2010 12:00 AM    COMPLETE CBC W/AUTO DIFF WBC    Reviewed

 

                    2010 12:00 AM    LIPID PANEL         Reviewed

 

                    2013 12:00 AM    Flu Injection 3 Years And Above NDC# [address]-53  [facility]    Reviewed



 

                    2013 12:00 AM    COMPLETE CBC W/AUTO DIFF WBC    Reviewed

 

                    2013 12:00 AM    ASSAY OF LITHIUM    Reviewed

 

                    2013 12:00 AM    METABOLIC PANEL TOTAL CA    Reviewed

 

                    4/3/2013 12:00 AM    THER/PROPH/DIAG INJ SC/IM    Reviewed

 

                    4/3/2013 12:00 AM    B12 Injection, Up to 1000 Mcg NDC#4101-5454-16    Reviewed

 

                    2013 12:00 AM    THER/PROPH/DIAG INJ SC/IM    Reviewed

 

                    2013 12:00 AM    B12 Injection, Up to 1000 Mcg NDC#7578-4822-65    Reviewed

 

                    2013 12:00 AM    THER/PROPH/DIAG INJ SC/IM    Reviewed

 

                    2013 12:00 AM    B12 Injection, Up to 1000 Mcg NDC#7356-2798-40    Reviewed

 

                    2013 12:00 AM    LIPID PANEL         Reviewed

 

                    2013 12:00 AM    VITAMIN D 25 HYDROXY    Reviewed

 

                    2013 12:00 AM    THER/PROPH/DIAG INJ SC/IM    Reviewed

 

                    2013 12:00 AM    B12 Injection, Up to 1000 Mcg NDC#0754-7154-88    Reviewed

 

                    2013 12:00 AM    THER/PROPH/DIAG INJ SC/IM    Reviewed

 

                    3/6/2014 12:00 AM    THER/PROPH/DIAG INJ SC/IM    Reviewed

 

                    2014 12:00 AM    THER/PROPH/DIAG INJ SC/IM    Reviewed

 

                    2014 12:00 AM    B12 Injection, Up to 1000 Mcg NDC#2189-5068-55    Reviewed

 

                    2010 12:00 AM    SKIN FUNGI CULTURE    Reviewed

 

                    10/9/2010 12:00 AM    COMPREHEN METABOLIC PANEL    Reviewed

 

                    10/9/2010 12:00 AM    LIPID PANEL         Reviewed

 

                    2010 12:00 AM    THER/PROPH/DIAG INJ SC/IM    Reviewed

 

                    2010 12:00 AM    B12 Injection Ndc#[address]-64 ([facility])    Reviewed

 

                    2010 12:00 AM    THER/PROPH/DIAG INJ SC/IM    Reviewed

 

                    2010 12:00 AM    Kenalog 40 Mg Im-Ndc#[address]-44 ([facility])    Reviewed

 

                    10/15/2010 12:00 AM    FLU VACCINE 3 YRS & > IM    Reviewed

 

                    10/15/2010 12:00 AM    Admin.Of M/C Cov.Vaccine-Flu Vacc.    Reviewed

 

                    1/15/2011 12:00 AM    COMPLETE CBC W/AUTO DIFF WBC    Reviewed

 

                    1/15/2011 12:00 AM    COMPREHEN METABOLIC PANEL    Reviewed

 

                    1/15/2011 12:00 AM    LIPID PANEL         Reviewed

 

                    2014 12:00 AM    MAMMOGRAM SCREENING    Reviewed

 

                    2014 12:00 AM    Screening mammography, bilateral    Reviewed

 

                    7/10/2014 12:00 AM    THER/PROPH/DIAG INJ SC/IM    Reviewed

 

                    7/10/2014 12:00 AM    B12 Injection, Up to 1000 Mcg NDC#8692-5139-61    Reviewed

 

                    2011 12:00 AM    COMPLETE CBC W/AUTO DIFF WBC    Reviewed

 

                    2011 12:00 AM    COMPREHEN METABOLIC PANEL    Reviewed

 

                    2011 12:00 AM    LIPID PANEL         Reviewed

 

                    2014 12:00 AM    B12 Injection, Up to 1000 Mcg NDC#2627-2029-80    Reviewed

 

                    10/19/2014 12:00 AM    MAMMOGRAM SCREENING    Reviewed

 

                    10/19/2014 12:00 AM    Screening mammography, bilateral    Reviewed

 

                    10/16/2014 12:00 AM    B12 Injection, Up to 1000 Mcg NDC#1886-4001-75    Reviewed

 

                    10/16/2014 12:00 AM    COMPLETE CBC W/AUTO DIFF WBC    Reviewed

 

                    10/16/2014 12:00 AM    COMPREHEN METABOLIC PANEL    Reviewed

 

                    10/16/2014 12:00 AM    IMMUNOASSAY TUMOR     Reviewed

 

                    10/16/2014 12:00 AM    LIPID PANEL         Reviewed

 

                    10/16/2014 12:00 AM    ASSAY OF LITHIUM    Reviewed

 

                    10/16/2014 12:00 AM    MAMMOGRAM SCREENING    Reviewed

 

                    2011 12:00 AM    ASSAY OF PARATHORMONE    Reviewed

 

                    2011 12:00 AM    VITAMIN D 25 HYDROXY    Reviewed

 

                    2011 12:00 AM    ASSAY OF LITHIUM    Reviewed

 

                    2011 12:00 AM    METABOLIC PANEL TOTAL CA    Reviewed

 

                    2011 12:00 AM    CT HEAD/BRAIN W/O & W/DYE    Reviewed

 

                    3/23/2015 12:00 AM    PNEUMOCOCCAL VACC 13 GLENDY IM    Reviewed

 

                    3/23/2015 12:00 AM    Vitamin B12 injection    Reviewed

 

                    2011 12:00 AM    ASSAY OF LITHIUM    Reviewed

 

                    2011 12:00 AM    B12 Injection Ndc#[address]-92  [place]    Reviewed

 

                    2015 12:00 AM    THER/PROPH/DIAG INJ SC/IM    Reviewed

 

                    2015 12:00 AM    B12 Injection, Up to 1000 Mcg NDC#5401-4614-30    Reviewed

 

                    2015 12:00 AM    COMPLETE CBC W/AUTO DIFF WBC    Reviewed

 

                    2015 12:00 AM    COMPREHEN METABOLIC PANEL    Reviewed

 

                    2015 12:00 AM    LIPID PANEL         Reviewed

 

                    2015 12:00 AM    ASSAY OF LITHIUM    Reviewed

 

                    2011 12:00 AM    VIT D 1 25-DIHYDROXY    Reviewed

 

                    2011 12:00 AM    VITAMIN B-12        Reviewed

 

                    2015 12:00 AM    B12 Injection, Up to 1000 Mcg NDC#1323-0798-53    Reviewed

 

                    2015 12:00 AM    THER/PROPH/DIAG INJ SC/IM    Reviewed

 

                    2015 12:00 AM    B12 Injection, Up to 1000 Mcg NDC#0286-9338-16    Reviewed

 

                    2011 12:00 AM    THER/PROPH/DIAG INJ SC/IM    Reviewed

 

                    2011 12:00 AM    B12 Injection ([employer]) Ndc#5856-2479-27    Reviewed

 

                    2015 12:00 AM    THER/PROPH/DIAG INJ SC/IM    Reviewed

 

                    2015 12:00 AM    B12 Injection, Up to 1000 Mcg NDC#4229-6770-60    Reviewed







Results Summary







                          Date and Description      Results

 

                          2004 12:00 AM        Colonoscopy-Women and Men over 50 Normal 

 

                          2008 12:00 AM         Pap Smear Declined 

 

                          10/7/2009 12:00 AM        Cholest Cry Stone Ql .0 %LDLc SerPl-mCnc 123.0 mg/dLHDLc

 SerPl-mCnc 34.0 mg/dLTrigl SerPl-mCnc 190.0 mg/dLGlucose SerPl-mCnc 78.0 mg/dL

 

                          2009 12:00 AM        Mammogram -Women over 40 Normal HIV1+2 Ab Ser Ql no risk 

 

                          2010 8:47 AM         Dexa Bone Scan Refused Aspirin reccommended Contraindication 



 

                          2010 8:48 AM         Depression Done 

 

                          2010 12:00 AM         Foot Exam-Diabetic Done 

 

                          2010 12:00 AM         Cholest Cry Stone Ql .0 %LDLc SerPl-mCnc 126.0 mg/dLGlucose

 SerPl-mCnc 102.0 mg/dL

 

                          2010 8:45 AM          TRIGLYCERIDES 122.0 mg/dLCHOLESTEROL 186.0 mg/dLHDL 36.0 mg/dLTOT

 CHOL/HDL 5.2 LDL (CALC) 126.0 mg/dLGLUCOSE 102.0 mg/dLSODIUM 143.0 
mmol/LPOTASSIUM 3.70 mmol/LCHLORIDE 111.0 mmol/LCO2 23.0 mmol/LBUN 10.0 
mg/dLCREATININE 0.80 mg/dLSGOT/AST 12.0 IU/LSGPT/ALT 11.0 IU/LALK PHOS 65.0 
IU/LTOTAL PROTEIN 7.20 g/dLALBUMIN 3.90 g/dLTOTAL BILI 0.50 mg/dLCALCIUM 10.20 
mg/dLAGE 59 GFR NonAA 73 GFR AA 88 eGFR >60 mL/min/1.73 m2eGFR AA* >60 WBC 5.7 
RBC 3.26 HGB 10.60 g/dLHCT 31.70 %MCV 97.0 fLMCH 32.50 pgMCHC 33.40 g/dLRDW SD 
47 RDW CV 13.30 %MPV 9.70 fLPLT 287 NRBC# 0.00 NRBC% 0.0 %NEUT 62.90 %%LYMP 
21.80 %%MONO 9.90 %%EOS 5.0 %%BASO 0.40 %#NEUT 3.56 #LYMP 1.23 #MONO 0.56 #EOS 
0.28 #BASO 0.02 MANUAL DIFF NOT IND 

 

                          2010 12:00 AM        Glucose SerPl-mCnc 96.0 mg/dLCholest Cry Stone Ql .0 %LDLc

 SerPl-mCnc 146.0 mg/dL

 

                          2010 8:26 AM         TRIGLYCERIDES 106.0 mg/dLCHOLESTEROL 199.0 mg/dLHDL 32.0 mg/dLTOT

 CHOL/HDL 6.2 LDL (CALC) 146.0 mg/dLGLUCOSE 96.0 mg/dLSODIUM 143.0 
mmol/LPOTASSIUM 4.0 mmol/LCHLORIDE 113.0 mmol/LCO2 24.0 mmol/LBUN 13.0 
mg/dLCREATININE 1.0 mg/dLSGOT/AST 11.0 IU/LSGPT/ALT 6.0 IU/LALK PHOS 56.0 
IU/LTOTAL PROTEIN 6.60 g/dLALBUMIN 3.80 g/dLTOTAL BILI 0.50 mg/dLCALCIUM 9.30 
mg/dLAGE 59 GFR NonAA 57 GFR AA 69 eGFR 57 eGFR AA* >60 

 

                          10/6/2010 12:00 AM        Cholest Cry Stone Ql .0 %LDLc SerPl-mCnc 111.0 mg/dLGlucose

 SerPl-mCnc 81.0 mg/dL

 

                          10/6/2010 2:45 PM         TRIGLYCERIDES 123.0 mg/dLCHOLESTEROL 178.0 mg/dLHDL 42.0 mg/dLTOT

 CHOL/HDL 4.2 LDL (CALC) 111.0 mg/dLGLUCOSE 81.0 mg/dLSODIUM 139.0 
mmol/LPOTASSIUM 4.10 mmol/LCHLORIDE 106.0 mmol/LCO2 24.0 mmol/LBUN 13.0 
mg/dLCREATININE 0.90 mg/dLSGOT/AST 13.0 IU/LSGPT/ALT 11.0 IU/LALK PHOS 61.0 
IU/LTOTAL PROTEIN 7.10 g/dLALBUMIN 3.90 g/dLTOTAL BILI 0.30 mg/dLCALCIUM 9.30 
mg/dLAGE 60 GFR NonAA 64 GFR AA 78 eGFR >60 mL/min/1.73 m2eGFR AA* >60 WBC 6.9 
RBC 3.59 HGB 11.50 g/dLHCT 35.30 %MCV 98.0 fLMCH 32.0 pgMCHC 32.60 g/dLRDW SD 46
 RDW CV 12.90 %MPV 9.90 fLPLT 311 NRBC# 0.00 NRBC% 0.0 %NEUT 64.90 %%LYMP 22.50 
%%MONO 7.20 %%EOS 5.10 %%BASO 0.30 %#NEUT 4.45 #LYMP 1.54 #MONO 0.49 #EOS 0.35 
#BASO 0.02 MANUAL DIFF NOT IND 

 

                          2011 12:00 AM         Mammogram -Women over 40 Ordered 

 

                          2011 10:25 AM        TRIGLYCERIDES 111.0 mg/dLCHOLESTEROL 195.0 mg/dLHDL 43.0 mg/dLTOT

 CHOL/HDL 4.5 LDL (CALC) 130.0 mg/dLWBC 5.3 RBC 3.76 HGB 12.0 g/dLHCT 37.80 %MCV
 101.0 fLMCH 31.90 pgMCHC 31.70 g/dLRDW SD 47 RDW CV 13.0 %MPV 9.70 fLPLT 259 
NRBC# 0.00 NRBC% 0.0 %NEUT 69.0 %%LYMP 17.60 %%MONO 8.30 %%EOS 4.70 %%BASO 0.40 
%#NEUT 3.63 #LYMP 0.93 #MONO 0.44 #EOS 0.25 #BASO 0.02 MANUAL DIFF NOT IND 
GLUCOSE 102.0 mg/dLSODIUM 146.0 mmol/LPOTASSIUM 4.20 mmol/LCHLORIDE 113.0 
mmol/LCO2 23.0 mmol/LBUN 15.0 mg/dLCREATININE 1.0 mg/dLSGOT/AST 12.0 
IU/LSGPT/ALT 17.0 IU/LALK PHOS 60.0 IU/LTOTAL PROTEIN 6.90 g/dLALBUMIN 4.20 
g/dLTOTAL BILI 0.40 mg/dLCALCIUM 9.70 mg/dLAGE 60 GFR NonAA 57 GFR AA 69 eGFR 57
 eGFR AA* >60 

 

                          2011 11:49 AM        Cholest Cry Stone Ql .0 %LDLc SerPl-mCnc 130.0 mg/dLHDLc

 SerPl-mCnc 43.0 mg/dLTrigl SerPl-mCnc 111.0 mg/dLGlucose SerPl-mCnc 102.0 mg/dL

 

                          2011 11:52 AM        Pap Smear Declined 

 

                          2011 11:28 AM        Lithium 2.080 mmol/LGLUCOSE 102.0 mg/dLSODIUM 135.0 mmol/LPOTASSIUM

 3.90 mmol/LCHLORIDE 106.0 mmol/LCO2 21.0 mmol/LBUN 12.0 mg/dLCREATININE 1.30 
mg/dLCALCIUM 10.70 mg/dLAGE 60 GFR NonAA 42 GFR AA 51 eGFR 42 eGFR AA* 51 

 

                          2011 8:58 AM          Lithium 0.690 mmol/L

 

                          2011 2:38 PM         VITAMIN B12 3483.0 pg/mL

 

                          2013 3:35 PM          WBC 5.1 RBC 3.73 HGB 11.70 g/dLHCT 36.40 %MCV 98.0 fLMCH 31.40

 pgMCHC 32.10 g/dLRDW SD 47 RDW CV 13.10 %MPV 9.80 fLPLT 224 NRBC# 0.00 NRBC% 
0.0 %NEUT 66.80 %%LYMP 19.10 %%MONO 9.0 %%EOS 4.90 %%BASO 0.20 %#NEUT 3.42 #LYMP
 0.98 #MONO 0.46 #EOS 0.25 #BASO 0.01 MANUAL DIFF NOT IND GLUCOSE 88.0 
mg/dLSODIUM 141.0 mmol/LPOTASSIUM 4.10 mmol/LCHLORIDE 110.0 mmol/LCO2 22.0 
mmol/LBUN 22.0 mg/dLCREATININE 1.10 mg/dLCALCIUM 9.80 mg/dLAGE 62 GFR NonAA 50 
GFR AA 61 eGFR 50 eGFR AA* 60 Lithium 0.760 mmol/L

 

                          2013 11:02 AM        TRIGLYCERIDES 106.0 mg/dLCHOLESTEROL 181.0 mg/dLHDL 46.0 mg/dLTOT

 CHOL/HDL 3.9 LDL (CALC) 114.0 mg/dLVITAMIN D 41.10 ng/mL

 

                          10/17/2014 10:10 AM       WBC 5.0 RBC 3.66 HGB 11.60 g/dLHCT 36.80 %.0 fLMCH 31.70

 pgMCHC 31.50 g/dLRDW SD 50 RDW CV 13.50 %MPV 10.10 fLPLT 209 NRBC# 0.00 NRBC% 
0.0 %NEUT 69.20 %%LYMP 21.0 %%MONO 6.40 %%EOS 3.20 %%BASO 0.20 %#NEUT 3.46 #LYMP
 1.05 #MONO 0.32 #EOS 0.16 #BASO 0.01 MANUAL DIFF NOT IND GLUCOSE 100.0 
mg/dLSODIUM 148.0 mmol/LPOTASSIUM 3.90 mmol/LCHLORIDE 114.0 mmol/LCO2 26.0 
mmol/LBUN 12.0 mg/dLCREATININE 1.20 mg/dLSGOT/AST 9.0 IU/LSGPT/ALT <6 IU/LALK 
PHOS 82.0 IU/LTOTAL PROTEIN 6.90 g/dLALBUMIN 4.0 g/dLTOTAL BILI 0.40 
mg/dLCALCIUM 10.50 mg/dLAGE 64 GFR NonAA 45 GFR AA 55 eGFR 45 eGFR AA* 55 
TRIGLYCERIDES 96.0 mg/dLCHOLESTEROL 155.0 mg/dLHDL 38.0 mg/dLTOT CHOL/HDL 4.1 
LDL (CALC) 98.0 mg/dLLithium 0.850 mmol/LCancer Antigen (CA) 125 8.30 U/mL

 

                          2015 10:25 AM        Lithium 0.790 mmol/LWBC 4.8 RBC 3.44 HGB 11.0 g/dLHCT 35.20 

%.0 fLMCH 32.0 pgMCHC 31.30 g/dLRDW SD 53 RDW CV 14.0 %MPV 9.30 fLPLT 210
 NRBC# 0.00 NRBC% 0.0 %NEUT 70.80 %%LYMP 17.20 %%MONO 8.10 %%EOS 3.50 %%BASO 
0.40 %#NEUT 3.41 #LYMP 0.83 #MONO 0.39 #EOS 0.17 #BASO 0.02 MANUAL DIFF NOT IND 
TRIGLYCERIDES 107.0 mg/dLCHOLESTEROL 174.0 mg/dLHDL 43.0 mg/dLTOT CHOL/HDL 4.0 
LDL (CALC) 110.0 mg/dLGLUCOSE 90.0 mg/dLSODIUM 145.0 mmol/LPOTASSIUM 3.80 
mmol/LCHLORIDE 115.0 mmol/LCO2 24.0 mmol/LBUN 17.0 mg/dLCREATININE 1.30 
mg/dLSGOT/AST 18.0 IU/LSGPT/ALT 17.0 IU/LALK PHOS 56.0 IU/LTOTAL PROTEIN 6.70 
g/dLALBUMIN 3.90 g/dLTOTAL BILI 0.40 mg/dLCALCIUM 9.80 mg/dLAGE 64 GFR NonAA 41 
GFR AA 50 eGFR 41 eGFR AA* 50 

 

                          2015 8:50 AM        WBC 5.8 RBC 3.29 HGB 10.70 g/dLHCT 34.0 %.0 fLMCH 32.50

 pgMCHC 31.50 g/dLRDW SD 52 RDW CV 13.60 %MPV 9.60 fLPLT 223 NRBC# 0.00 NRBC% 
0.0 %NEUT 69.60 %%LYMP 18.90 %%MONO 8.50 %%EOS 2.80 %%BASO 0.20 %#NEUT 4.03 
#LYMP 1.09 #MONO 0.49 #EOS 0.16 #BASO 0.01 MANUAL DIFF NOT IND Lithium 0.620 
mmol/LGLUCOSE 83.0 mg/dLSODIUM 139.0 mmol/LPOTASSIUM 3.90 mmol/LCHLORIDE 109.0 
mmol/LCO2 22.0 mmol/LBUN 19.0 mg/dLCREATININE 1.40 mg/dLSGOT/AST 19.0 
IU/LSGPT/ALT 21.0 IU/LALK PHOS 55.0 IU/LTOTAL PROTEIN 6.50 g/dLALBUMIN 3.90 
g/dLTOTAL BILI 0.50 mg/dLCALCIUM 9.60 mg/dLAGE 65 GFR NonAA 38 GFR AA 46 eGFR 38
 eGFR AA* 46 TRIGLYCERIDES 121.0 mg/dLCHOLESTEROL 192.0 mg/dLHDL 51.0 mg/dLTOT 
CHOL/HDL 3.8 .0 mg/dLFREE T4 0.79 TSH 1.210 uIU/mLHemoglobin A1c 5.40 
%Estim. Avg Glu (eAG) 108 

 

                          3/15/2016 8:08 AM         VITAMIN B12 696.0 pg/mL

 

                          3/23/2016 8:26 AM         WBC 7.0 RBC 3.61 HGB 11.80 g/dLHCT 37.70 %.0 fLMCH 32.70

 [facility] [place] g/dLRDW SD 49 RDW CV 12.50 %MPV 10.0 fLPLT 207 NRBC# 0.00 NRBC% 
0.0 %NEUT 73.60 %%LYMP 16.40 %%MONO 6.60 %%EOS 3.0 %%BASO 0.30 %#NEUT 5.15 #LYMP
 1.15 #MONO 0.46 #EOS 0.21 #BASO 0.02 MANUAL DIFF NOT IND Lithium 0.940 
mmol/LGLUCOSE 108.0 mg/dLSODIUM 143.0 mmol/LPOTASSIUM 4.30 mmol/LCHLORIDE 110.0 
mmol/LCO2 27.0 mmol/LBUN 16.0 mg/dLCREATININE 1.60 mg/dLSGOT/AST 13.0 
IU/LSGPT/ALT 7.0 IU/LALK PHOS 71.0 IU/LTOTAL PROTEIN 6.80 g/dLALBUMIN 4.0 
g/dLTOTAL BILI 0.20 mg/dLCALCIUM 10.40 mg/dLAGE 65 GFR NonAA 32 GFR AA 39 eGFR 
32 eGFR AA* 39 TRIGLYCERIDES 113.0 mg/dLCHOLESTEROL 169.0 mg/dLHDL 42.0 mg/dLTOT
 CHOL/HDL 4.0 LDL (CALC) 104.0 mg/dLFREE T4 0.86 TSH 2.20 uIU/mLHemoglobin A1c 
5.20 %Estim. Avg Glu (eAG) 103 

 

                          3/25/2016 9:17 AM         COLOR YELLOW APPEARANCE CLEAR SPEC GRAV 1.010 pH 7.0 PROTEIN 

NEGATIVE GLUCOSE NEGATIVE mg/dLKETONE NEGATIVE BILIRUBIN NEGATIVE BLOOD NEGATIVE
 NITRITE NEGATIVE [place] SCREEN SMALL MICRO IND? SEE BELOW WBC/HPF 0-5 RBC/HPF 
NEGATIVE CASTS/LPF NEGATIVE /LPFCRYSTALS NEGATIVE MUCOUS THRDS NEGATIVE BACTERIA
 NEGATIVE EPITH CELLS FEW SQUAMOUS /HPFTRICHOMONAS NEGATIVE YEAST NEGATIVE 

 

                          2016 6:00 AM        GLUCOSE 91.0 mg/dLSODIUM 143.0 mmol/LPOTASSIUM 3.60 mmol/LCHLORIDE

 112.0 mmol/LCO2 23.0 mmol/LBUN 22.0 mg/dLCREATININE 1.20 mg/dLSGOT/AST 15.0 
IU/LSGPT/ALT 12.0 IU/LALK PHOS 61.0 IU/LTOTAL PROTEIN 5.40 g/dLALBUMIN 3.10 
g/dLTOTAL BILI 0.40 mg/dLCALCIUM 8.40 mg/dLAGE 66 GFR NonAA 45 GFR AA 55 eGFR 45
 eGFR AA* 55 WBC 3.0 RBC 3.05 HGB 9.80 g/dLHCT 32.10 %.0 fLMCH 32.10 
pgMCHC 30.50 g/dLRDW SD 54 RDW CV 14.20 %MPV 10.10 fLPLT 170 NRBC# 0.00 NRBC% 
0.0 %NEUT 50.70 %%LYMP 32.60 %%MONO 10.50 %%EOS 5.90 %%BASO 0.30 %#NEUT 1.54 
#LYMP 0.99 #MONO 0.32 #EOS 0.18 #BASO 0.01 MANUAL DIFF NOT IND 

 

                          2016 2:09 PM        COLOR YELLOW APPEARANCE CLEAR SPEC GRAV 1.010 pH 5.0 PROTEIN

 30 GLUCOSE NEGATIVE mg/dLKETONE NEGATIVE BILIRUBIN NEGATIVE BLOOD LARGE NITRITE
 NEGATIVE [place] SCREEN MODERATE MICRO INDICATED? SEE BELOW WBC/HPF  RBC/HPF
 20-50 CASTS/LPF NEGATIVE /LPFCRYSTALS NEGATIVE MUCOUS THRDS NEGATIVE BACTERIA 
NEGATIVE EPITH CELLS FEW SQUAMOUS /HPFTRICHOMONAS NEGATIVE YEAST NEGATIVE CULT 
SET UP? YES 

 

                          1/3/2017 4:08 PM          COLOR YELLOW APPEARANCE HAZY SPEC GRAV 1.015 pH 6.0 PROTEIN 30

 GLUCOSE NEGATIVE mg/dLKETONE NEGATIVE BILIRUBIN NEGATIVE BLOOD MODERATE NITRITE
 NEGATIVE [place] SCREEN LARGE MICRO INDICATED? SEE BELOW WBC/-200 RBC/HPF 
5-10 CASTS/LPF NEGATIVE /LPFCRYSTALS NEGATIVE MUCOUS THRDS NEGATIVE BACTERIA 
NEGATIVE EPITH CELLS 1+ SQUAMOUS /HPFTRICHOMONAS NEGATIVE YEAST NEGATIVE CULT 
SET UP? YES 

 

                          2017 4:24 PM         CREATININE 1.50 mg/dLAGE 66 GFR NonAA 35 GFR AA 42 eGFR 35 eGFR

 AA* 42 VITAMIN B12 1324.0 pg/mL

 

                          2017 11:30 AM         GLUCOSE 93.0 mg/dLSODIUM 143.0 mmol/LPOTASSIUM 3.10 mmol/LCHLORIDE

 101.0 mmol/LCO2 29.0 mmol/LBUN 26.0 mg/dLCREATININE 1.50 mg/dLSGOT/AST 23.0 
IU/LSGPT/ALT 13.0 IU/LALK PHOS 66.0 IU/LTOTAL PROTEIN 7.70 g/dLALBUMIN 4.30 
g/dLTOTAL BILI 0.40 mg/dLCALCIUM 10.30 mg/dLAGE 66 GFR NonAA 35 GFR AA 42 eGFR 
35 eGFR AA* 42 TRIGLYCERIDES 147.0 mg/dLCHOLESTEROL 184.0 mg/dLHDL 44.0 mg/dLTOT
 CHOL/HDL 4.2 .0 mg/dLWBC 5.4 RBC 3.98 HGB 12.90 g/dLHCT 40.20 %.0
 fLMCH 32.40 pgMCHC 32.10 g/dLRDW SD 50 RDW CV 13.50 %MPV 9.30 fLPLT 210 NRBC# 
0.00 NRBC% 0.0 %NEUT 54.20 %%LYMP 30.70 %%MONO 9.10 %%EOS 5.20 %%BASO 0.40 
%#NEUT 2.94 #LYMP 1.66 #MONO 0.49 #EOS 0.28 #BASO 0.02 MANUAL DIFF NOT IND FREE 
T4 1.09 COLOR YELLOW APPEARANCE CLEAR SPEC GRAV <=1.005 pH 5.5 PROTEIN NEGATIVE 
GLUCOSE NEGATIVE mg/dLKETONE NEGATIVE BILIRUBIN NEGATIVE BLOOD NEGATIVE NITRITE 
NEGATIVE [place] SCREEN LARGE MICRO INDICATED? SEE BELOW WBC/HPF 10-20 RBC/HPF 0-5 
CASTS/LPF NEGATIVE /LPFCRYSTALS NEGATIVE MUCOUS THRDS NEGATIVE BACTERIA FEW 
EPITH CELLS 1+ SQUAMOUS /HPFTRICHOMONAS NEGATIVE YEAST NEGATIVE CULT SET UP? YES
 TSH 1.820 uIU/mL

 

                          2017 2:45 PM         COLOR YELLOW APPEARANCE CLEAR SPEC GRAV <=1.005 pH 6.0 PROTEIN

 NEGATIVE GLUCOSE NEGATIVE mg/dLKETONE NEGATIVE BILIRUBIN NEGATIVE BLOOD TRACE-
INTACT NITRITE NEGATIVE [place] SCREEN SMALL MICRO INDICATED? SEE BELOW WBC/HPF 0-5
 RBC/HPF NEGATIVE CASTS/LPF NEGATIVE /LPFCRYSTALS NEGATIVE MUCOUS THRDS NEGATIVE
 BACTERIA FEW EPITH CELLS FEW SQUAMOUS /HPFTRICHOMONAS NEGATIVE YEAST NEGATIVE 
CULT SET UP? YES 

 

                          2017 11:22 AM        COLOR YELLOW APPEARANCE CLEAR SPEC GRAV <=1.005 pH 6.5 PROTEIN

 NEGATIVE GLUCOSE NEGATIVE mg/dLKETONE NEGATIVE BILIRUBIN NEGATIVE BLOOD 
NEGATIVE NITRITE NEGATIVE LEUK SCREEN NEGATIVE MICRO INDICATED? NOT INDICATED 

 

                          2017 2:18 PM         Clarity Ur cloudy Color Ur dark yellow Glucose Ur-sCnc negative

 Bilirub Ur Ql Strip negative Ketones Ur Ql Strip negative Sp Gr Ur Qn 1.010 Hgb
 Ur Ql Strip trace-intact pH Ur-LsCnc 6.5 Prot Ur Ql Strip trace Urobilinogen 
Ur-mCnc 0.2 Nitrite Ur Ql Strip negative WBC Est Ur Ql Strip large 







History Of Immunizations







       Name    Date Admin    Mfg Name    Mfg Code    Trade Name    Lot#    Route    Inj    Vis Given    Vis

 Pub                                    CVX

 

        Influenza    2008    Not Entered    NE      Not Entered            Not Entered    Not Entered

                    1            999

 

        X       12/19/2008    [employer]    MSD     Pneumovax 23            Intramuscular    Not Entered

                    1            999

 

           Influenza    10/15/2010    [employer]    NOV        Fluvirin > 12 Years    

[id number]     Intramuscular    Left Deltoid    10/15/2010    2009    999

 

          X         3/23/2015    [facility]    [facility]       Prevnar 13    [id number]    Intramuscular

                Right Gluteous Medius    3/23/2015       2013       109







History of Past Illness







                    Name                Date of Onset       Comments

 

                    Peritoneal Neoplasm, Malignant                         

 

                    Hyperlipidemia                           

 

                    Bipolar disorder, unspecified                         

 

                    Artificial opening status; colostomy                         

 

                    B12 deficiency                           

 

                    Anemia, Pernicious                         

 

                    Arthritis unspecified                         

 

                    cervical cancer                          

 

                    Artificial opening status; colostomy    2010  1:10PM     

 

                    Bipolar disorder, unspecified    2010  1:10PM     

 

                    Hyperlipidemia      2010  1:10PM     

 

                    Anemia, Pernicious    2010  1:10PM     

 

                    Postoperative Follow-Up    2010  1:55PM     

 

                    Postoperative Follow-Up    Mar  8 2010 10:57AM     

 

                    Artificial opening status; colostomy    Mar  8 2010  1:19PM     

 

                    Peritoneal Neoplasm, Malignant    Mar  8 2010  1:19PM     

 

                    Artificial opening status; colostomy    2010  1:40PM     

 

                    Hyperlipidemia      2010  1:40PM     

 

                    Anemia, Pernicious    2010  1:40PM     

 

                    Peritoneal Neoplasm, Malignant    2010  1:40PM     

 

                    Arthritis unspecified    2010  1:40PM     

 

                    Anemia of Chronic Illness    2010  1:40PM     

 

                    Tinea corporis      2010  3:17PM     

 

                    Bipolar disorder, unspecified    2010  1:33PM     

 

                    Hyperlipidemia      2010  1:33PM     

 

                    Anemia, Pernicious    2010  1:33PM     

 

                    Peritoneal Neoplasm, Malignant    2010  1:33PM     

 

                    B12 deficiency      2010  1:33PM     

 

                    Ethmoidal Sinusitis, Acute    Sep 21 2010  3:53PM     

 

                    Wheezing            Sep 21 2010  3:53PM     

 

                    Flu                 Oct 15 2010  1:40PM     

 

                    Bipolar disorder, unspecified    Oct 15 2010  1:42PM     

 

                    Hyperlipidemia      Oct 15 2010  1:42PM     

 

                    Anemia, Pernicious    Oct 15 2010  1:42PM     

 

                    Peritoneal Neoplasm, Malignant    Oct 15 2010  1:42PM     

 

                    Bipolar disorder, unspecified    2011 12:01PM     

 

                    Hyperlipidemia      2011 12:01PM     

 

                    Anemia, Pernicious    2011 12:01PM     

 

                    Peritoneal Neoplasm, Malignant    2011 12:01PM     

 

                    Bipolar disorder, unspecified    Apr 15 2011 10:55AM     

 

                    Major Depression    2011 10:11AM     

 

                    Bipolar Disorder    2011 10:11AM     

 

                    Cancer              May 10 2011  4:16PM     

 

                    Major Depression    May 10 2011  3:16PM     

 

                    Bipolar Disorder    May 10 2011  3:16PM     

 

                    Hypercalcemia       May 23 2011  2:47PM     

 

                    Bipolar disorder, unspecified    May 23 2011  2:47PM     

 

                    Colon Cancer, Personal History    May 23 2011  2:47PM     

 

                    Bipolar Disorder    May 31 2011  4:39PM     

 

                    Depressive Disorder    2011 10:01AM     

 

                    Vitamin B12 deficiency    2011 10:01AM     

 

                    Vitamin D Deficiency    2011  5:07PM     

 

                    Anemia, Vitamin B12 Deficiency    2011  5:07PM     

 

                    B12 deficiency      2011  3:56PM     

 

                    Routine gynecological examination    Aug  4 2011  9:08AM     

 

                    Screening Examination for Breast Cancer    Aug  4 2011  9:08AM     

 

                    Tinea Corporis      Aug  4 2011  9:08AM     

 

                    Depressive Disorder    Sep 23 2011  8:47AM     

 

                    Contact Dermatitis    Sep 23 2011  8:47AM     

 

                    Anemia, Pernicious    Sep 23 2011  8:47AM     

 

                    B12 deficiency      Sep 23 2011  8:47AM     

 

                    B12 deficiency      Sep 27 2011  2:58PM     

 

                    B12 deficiency      Oct 20 2011  2:34PM     

 

                    Flu                 Dec  9 2011  3:16PM     

 

                    B12 deficiency      Dec  9 2011  3:17PM     

 

                    B12 deficiency      2012  4:52PM     

 

                    B12 deficiency      2012 11:10AM     

 

                    B12 deficiency      2012  3:37PM     

 

                    B12 deficiency      May  3 2012  4:10PM     

 

                    B12 deficiency      2012  2:54PM     

 

                    B12 deficiency      2012 11:23AM     

 

                    B12 deficiency      Aug  9 2012  2:08PM     

 

                    B12 deficiency      Sep  6 2012  4:36PM     

 

                    B12 deficiency      Oct 16 2012 10:23AM     

 

                    Flu                 Feb  4   3:11PM     

 

                    Bipolar disorder, unspecified    Feb  4   2:48PM     

 

                    Anemia, Pernicious    Feb  4   2:48PM     

 

                    B12 deficiency      Feb  4   2:48PM     

 

                    Extrapyramidal abnormal movement disorder    Feb  4   2:48PM     

 

                    B12 deficiency      Apr  3 2013 12:03PM     

 

                    Bipolar disorder, unspecified    May  7 2013  1:31PM     

 

                    Anemia, Pernicious    May  7 2013  1:31PM     

 

                    B12 deficiency      May  7 2013  1:31PM     

 

                    Extrapyramidal abnormal movement disorder    May  7 2013  1:31PM     

 

                    B12 deficiency      2013  3:42PM     

 

                    B12 deficiency      2013  1:31PM     

 

                    Hyperlipidemia      Aug  7 2013 10:37AM     

 

                    Vitamin D Deficiency    Aug  7 2013 10:37AM     

 

                    Bipolar disorder, unspecified    Aug  7 2013 10:37AM     

 

                    Anemia, Pernicious    Aug  7 2013 10:37AM     

 

                    B12 deficiency      Aug  7 2013 10:37AM     

 

                    B12 deficiency      Sep 25 2013 11:15AM     

 

                    B12 deficiency      Dec 11 2013  3:16PM     

 

                    B12 deficiency      Mar  6 2014  1:48PM     

 

                    B12 deficiency      May 21 2014  3:17PM     

 

                    Screening Examination for Breast Cancer    2014  3:23PM     

 

                    Periumbilical abdominal pain    2014  3:23PM     

 

                    B12 deficiency      Jul 10 2014  2:52PM     

 

                    Anemia, Vitamin B12 Deficiency    Aug 13 2014  4:50PM     

 

                    Bipolar disorder    Oct 16 2014 11:13AM     

 

                    Hyperlipidemia      Oct 16 2014 11:13AM     

 

                    Anemia, Pernicious    Oct 16 2014 11:13AM     

 

                    Peritoneal Neoplasm, Malignant    Oct 16 2014 11:13AM     

 

                    Screening breast examination    Oct 16 2014 11:13AM     

 

                    Weight loss         Oct 16 2014 11:13AM     

 

                    Anemia, Pernicious    Mar 23 2015  2:57PM     

 

                    B12 deficiency      Mar 23 2015  2:57PM     

 

                    Need for Prevnar vaccine    Mar 23 2015  2:57PM     

 

                    Bipolar disorder    Mar 23 2015  2:57PM     

 

                    Hyperlipidemia      Mar 23 2015  2:57PM     

 

                    Anemia, Pernicious    Mar 23 2015  2:57PM     

 

                    Peritoneal Neoplasm, Malignant    Mar 23 2015  2:57PM     

 

                    B12 deficiency      May  4 2015  4:48PM     

 

                    Hyperlipidemia      May 13 2015  9:56AM     

 

                    Anemia              May 13 2015  9:56AM     

 

                    Bipolar disorder    May 13 2015  9:56AM     

 

                    Bipolar disorder    May 14 2015  3:27PM     

 

                    Hyperlipidemia      May 14 2015  3:27PM     

 

                    Anemia, Pernicious    May 14 2015  3:27PM     

 

                    Peritoneal Neoplasm, Malignant    May 14 2015  3:27PM     

 

                    B12 deficiency      2015  2:20PM     

 

                    B12 deficiency      2015 11:34AM     

 

                    B12 deficiency      Aug 18 2015  9:06AM     

 

                    Tinea Corporis      Sep 18 2015  8:54AM     

 

                    B12 deficiency      Sep 18 2015  8:54AM     

 

                    B12 deficiency      2015 10:28AM     

 

                    Herpes zoster without complication    Dec  3 2015  9:52AM     

 

                    B12 deficiency      Dec 23 2015 11:21AM     

 

                    B12 deficiency      2016  4:51PM     

 

                    Vitamin B 12 deficiency    Mar 14 2016  5:35PM     

 

                    B12 deficiency      Mar 15 2016 12:14PM     

 

                    B12 deficiency      May  5 2016 11:30AM     

 

                    Edema               May  5 2016 11:30AM     

 

                    Dermatitis          May  5 2016 11:30AM     

 

                    Edema               May 17 2016  8:38AM     

 

                    Shortness of breath    May 17 2016  8:38AM     

 

                    Bilateral edema of lower extremity    2016  2:06PM     

 

                    B12 deficiency      2016  2:06PM     

 

                    B12 deficiency      2016 11:50AM     

 

                    B12 deficiency      2016 11:20AM     

 

                    Diarrhea            Aug  2 2016  3:13PM     

 

                    B12 deficiency      Aug 24 2016 11:10AM     

 

                    Encounter for screening mammogram for breast cancer    Aug 24 2016 11:44AM     

 

                    B12 deficiency      Sep 28 2016  2:35PM     

 

                    B12 deficiency      Dec 15 2016  2:02PM     

 

                    Dysuria             Dec 29 2016 12:14PM     

 

                    Hematuria           Fidencio  3 2017  1:33PM     

 

                    Constipation by delayed colonic transit    2017  1:52PM     

 

                    Ileus               2017  1:52PM     

 

                    UTI (urinary tract infection)    Fidencio 15 2017  3:39PM     

 

                    Acute cystitis with hematuria    2017 11:07AM     

 

                    B12 deficiency      2017 11:07AM     

 

                    B12 deficiency      2017 11:40AM     

 

                    B12 deficiency      2017  4:07PM     

 

                    Slurred speech      2017  3:07PM     

 

                    Vitamin B12 deficiency    2017  3:07PM     

 

                    Dysphagia, unspecified type    2017  3:07PM     

 

                    Hyperlipidemia      2017  3:07PM     

 

                    Dysuria             2017 12:01PM     

 

                    B12 deficiency      2017  2:08PM     

 

                    Dysuria             2017 10:58AM     

 

                    Hematuria           May 22 2017  1:36PM     

 

                    Depressive Disorder    May 22 2017  1:36PM     

 

                    Constipation        May 22 2017  1:36PM     

 

                    Fatigue             May 22 2017  1:36PM     

 

                    Urinary tract infection    May 30 2017  9:36AM     

 

                    Hypokalemia         May 30 2017 12:03PM     







Payers







           Insurance Name    Company Name    Plan Name    Plan Number    Policy Number    Policy Group

 Number                                 Start Date

 

                    Medicare [facility]    Medicare [facility]              [id number]              N/A

 

                          [employer]    [employer]                 [id number]

                                                    

 

                    Medicare Part A    Medicare - Lab/Xray              [id number]              2006

 

                    Medicare Part B    [employer] [place]              [id number]              2006

 

                          [employer]    [employer]                 50 percent

                                                    2009

 

                    [facility]              [id number]              N/A

 

                    Medicare Part A    Medicare Part A              [id number]              N/A

 

                    Medicare Part A    Medicare Part A              [id number]              2006









History of Encounters







                    Visit Date          Visit Type          Provider

 

                    2017           Office visit        Bhupinder Aspen DO

 

                    2017           Nurse visit         Bhupinder Aspen DO

 

                    2017           Office visit         

 

                    2017           Office visit        Bhupinder Aspen DO

 

                    2017           Nurse visit         Bhupinder Aspen DO

 

                    2017           Office visit        Radha Ontiveros APRN

 

                    1/15/2017           Office visit        Aj Tapia NP

 

                    2017            Office visit        Devin Masterson MD

 

                    2016          [facility]            Devin Masterson MD

 

                    12/15/2016          Nurse visit         Bhupinder Aspen DO

 

                    2016           Nurse visit         Bret Forte APRN

 

                    2016           Nurse visit         Bhupinder Aspen DO

 

                    2016            Office visit        Bhupinder Aspen DO

 

                    2016           Nurse visit         Bhupinder Aspen DO

 

                    2016           Office visit        Bret Forte APRN

 

                    2016            Office visit        Bhupinder Aspen DO

 

                    3/15/2016           Nurse visit         Bhupinder Aspen DO

 

                    2016            Nurse visit         Bhupinder Aspen DO

 

                    2015          Nurse visit         Bhupinder Aspen DO

 

                    12/3/2015           Office visit        Bhupinder Aspen DO

 

                    2015          Nurse visit         Bhupinder Aspen DO

 

                    2015           Office visit        Bhupinder Aspen DO

 

                    2015           Nurse visit         Bhupinder Aspen DO

 

                    2015            Nurse visit         Bhupinder Aspen DO

 

                    2015            Nurse visit         Bhupinder Aspen DO

 

                    2015           Office visit        Bhupinder Aspen DO

 

                    2015            Nurse visit         Bhupinder Aspen DO

 

                    3/23/2015           Office visit        Bhupinder Aspen DO

 

                    10/16/2014          Office visit        Bhupinder Aspen DO

 

                    2014           Nurse visit         Radha Ontiveros APRN

 

                    7/10/2014           Nurse visit         Bhupinder Aspen DO

 

                    2014           Office visit        Bhupinder Aspen DO

 

                    2014           Nurse visit         Bhupinder Asepn DO

 

                    3/6/2014            Nurse visit         Bhupinder Aspen DO

 

                    2014            [facility]            EARNEST Lopez MD

 

                    2013          Nurse visit         Bhupinder Aspen DO

 

                    2013           Nurse visit         Bhupinder Aspen DO

 

                    2013            Office visit        Bhupinder Aspen DO

 

                    2013            Nurse visit         Bhupinder Aspen DO

 

                    2013            Nurse visit         Bhupinder Aspen DO

 

                    2013            Office visit        Bhupinder Aspen DO

 

                    4/3/2013            Nurse visit         Bhupinder Aspen DO

 

                    2013            Office visit        Bhupinder Aspen DO

 

                    10/16/2012          Nurse visit         Bhupinder Aspen DO

 

                    2012            Nurse visit         Bhupinder Aspen DO

 

                    2012            Voided              Bhupinder Aspen DO

 

                    2012            Nurse visit         Bhupinder Aspen DO

 

                    2012            Nurse visit         Bhupinder Aspen DO

 

                    2012           Nurse visit         Bhupinder Aspen DO

 

                    5/3/2012            Nurse visit         Bhupinder Aspen DO

 

                    2012           Nurse visit         Bhupinder Aspen DO

 

                    2012           Nurse visit         Bhupinder Aspen DO

 

                    2012           Nurse visit         Bhupinder Aspen DO

 

                    2011           Nurse visit         Bhupinder Aspen DO

 

                    10/20/2011          Nurse visit         Bhupinder Aspen DO

 

                    2011           Office visit        Bhupinder Aspen DO

 

                    2011           Nurse visit         Radha GREEN

 

                    2011            Office visit        Bhupinder Aspen DO

 

                    2011           Nurse visit         Bhupinder Aspen DO

 

                    2011            Office visit        Bhupinder Aspen DO

 

                    2011           Office visit        Bhupinder Aspen DO

 

                    5/10/2011           Office visit        Bhupinder Aspen DO

 

                    2011           Office visit        Bhupinder Aspen DO

 

                    4/15/2011           Office visit        Devin Angel DO

 

                    2011           Office visit        Devin Angel DO

 

                    10/15/2010          Office visit        Devin Angel DO

 

                    2010           Office visit        Devin Angel DO

 

                    2010            Office visit        Devin Angel DO

 

                    2010           Office visit        Devin Angel DO

 

                    2010            Office visit        Devin Angel DO

 

                    3/8/2010            Office visit        Devin Masterson MD

 

                    2010            Surgery             Devin Masterson MD

 

                    2010            Office visit        Devin Angel DO

 

                    2010           Surgery             Devin Masterson MD

 

                    2010           Hospital            Devin Masterson MD

 

                    2010           [facility]            Devin Masterson MD

 

                    10/22/2009          Office visit        Devin Angel DO

## 2019-06-26 NOTE — XMS REPORT
MU2 Ambulatory Summary

                             Created on: 2017



Pauline Gan

External Reference #: [id number]

: 1950

Sex: Female



Demographics







                          Address                   [address]

[place]  [id number]

 

                          Home Phone                [phone]

 

                          Preferred Language        English

 

                          Marital Status            Legally 

 

                          Confucianism Affiliation     Unknown

 

                          Race                      White

 

                          Ethnic Group              Not  or 





Author







                          Author                    Pranav Angel

 

                          Organization              [facility]

 

                          Address                   [address]

[place]  [id number]



 

                          Phone                     [phone]







Care Team Providers







                    Care Team Member Name    Role                Phone

 

                    Pranav Angel     PCP                 Unavailable

 

                    Bhupinder Louise    PreferredProvider    Unavailable







Allergies and Adverse Reactions







                    Name                Reaction            Notes

 

                    NO KNOWN DRUG ALLERGIES                         







Plan of Treatment







             Planned Activity    Comments     Planned Date    Planned Time    Plan/Goal

 

             Injection,Subcutaneous/Intramuscul, [facility]                 2017    12:00 AM      







Medications







                                        Active 

 

             Name         Start Date    Estimated Completion Date    SIG          Comments

 

                Latuda 20 mg oral tablet                                    take 1 tablet (20 mg) by oral route once daily with

 food (at least 350 calories)            

 

             pravastatin 40 mg oral tablet    3/30/2015                 TAKE 1 TABLET BY MOUTH DAILY     

 

                Namenda XR 28 mg oral capsule,sprinkle,ER 24hr    2015                       take 1 capsule (28

 mg) by oral route once daily            

 

                Namenda XR 28 mg oral capsule,sprinkle,ER 24hr    2016                       take 1 capsule (28

 mg) by oral route once daily            

 

                potassium chloride 10 mEq oral tablet extended release    2016                       take 1 tablet

 (10 meq) by oral route once daily       

 

             pravastatin 40 mg oral tablet    2016                 TAKE 1 TABLET BY MOUTH DAILY     

 

                Vitamin B-12 1,000 mcg/mL injection solution    2016                       inject 1 milliliter 

(1,000 mcg) by intramuscular route once a month     

 

                potassium chloride 10 mEq oral tablet extended release    2016                      take 1 tablet

 (10 meq) by oral route once daily       

 

                Namenda XR 28 mg oral capsule,sprinkle,ER 24hr    2016                      TAKE 1 CAPSULE BY

 MOUTH EVERY DAY                         

 

                furosemide 40 mg oral tablet    2016                      take 1 tablet (40 mg) by oral route

 once daily                              

 

                mirtazapine 45 mg oral tablet                                    take 1 tablet (45 mg) by oral route once daily

 before bedtime                          

 

             Fish Oil 300-1,000 mg oral capsule                              take 1 capsule by oral route daily     

 

             Vitamin D3 1,000 unit oral tablet                              take 1 tablet by oral route daily     

 

                Calcium 600 600 mg calcium (1,500 mg) oral tablet                                    take 1 tablet by oral route

 daily                                   

 

                Aspirin Low Dose 81 mg oral tablet,delayed release (DR/EC)                                    take 1 tablet 

(81 mg) by oral route once daily         

 

                metoclopramide HCl 10 mg oral tablet    2017                       take 1 tablet by oral route 

2 times a day for 50 days                

 

             furosemide 40 mg oral tablet    2017                 TAKE 1 TABLET BY MOUTH DAILY     

 

                Namenda XR 28 mg oral capsule,sprinkle,ER 24hr    2017                       TAKE 1 CAPSULE BY 

MOUTH EVERY DAY                          

 

                Linzess 72 mcg oral capsule    2017                       take 1 capsule (72 mcg) by oral route

 once daily on an empty stomach at least 30 minutes before 1st meal of the day     



 

             pravastatin 40 mg oral tablet    2017                 TAKE 1 TABLET BY MOUTH DAILY     

 

                metoclopramide HCl 10 mg oral tablet    2017                       TAKE 1 TABLET BY ORAL ROUTE 

2 TIMES A DAY FOR 50 DAYS                

 

                potassium chloride 10 mEq oral tablet extended release    2017                       TAKE 2 TABLETs

 BY MOUTH twice DAILY for one week       

 

                potassium chloride 10 mEq oral tablet extended release    2017                       TAKE 2 TABLETs

 BY MOUTH twice DAILY for one week       

 

             simvastatin 20 mg oral tablet    2017                 TAKE 1 TABLET BY MOUTH AT BEDTIME    

 

 

             famotidine 20 mg oral tablet    2017                 TAKE 1 TABLET BY MOUTH TWICE DAILY    

 

 

             memantine 10 mg oral tablet    2017                 TAKE 1 TABLET BY MOUTH TWICE DAILY     



 

                rivastigmine tartrate 1.5 mg oral capsule    2017                       TAKE 1 CAPSULE BY MOUTH

 TWICE DAILY BEFORE MEALS                

 

             famotidine 20 mg oral tablet    2017                 TAKE 1 TABLET BY MOUTH TWICE DAILY    

 

 

                carbamazepine 200 mg oral tablet    2017                       TAKE 1 TABLET BY MOUTH BEFORE BREAKFAST

 AND TAKE 2 TABLETS AT BEDTIME           









                                         

 

             Name         Start Date    Expiration Date    SIG          Comments

 

             Reglan 10mg    3/29/2010    2010    one ac and hs     

 

                Keflex 500 mg oral capsule    2010       10/1/2010       take 1 capsule (500 mg) by oral

 route every 6 hours for 10 days         

 

                Bactrim -160 mg oral tablet    2011       take 1 tablet by oral route

 every 12 hours for 7 days               

 

                triamcinolone acetonide 0.1 % topical cream    2011      apply a thin

 layer to the affected area(s) by topical route 2 times per day     

 

                sertraline 100 mg oral tablet    4/10/2012       5/10/2012       take 1.5 tablets by oral route

 daily for 30 days                       

 

                ergocalciferol (vitamin D2) 50,000 unit oral capsule    4/15/2013       2013       TAKE

 ONE CAPSULE BY MOUTH ONCE A WEEK        

 

                CYANOCOBALAM 1000MCGINJ 1000 milliliter    2013       INJECT 1ML INTRAMUSCULAR

 ONCE A MONTH                            

 

                pravastatin 40 mg oral tablet    3/25/2014       3/20/2015       TAKE ONE TABLET BY MOUTH EVERY

 DAY                                     

 

                          Zostavax (PF) 19,400 unit/0.65 mL subcutaneous suspension for reconstitution    3/23/2015

                    3/24/2015           inject 0.65 milliliter by subcutaneous route once     

 

                famciclovir 500 mg oral tablet    12/3/2015       12/10/2015      take 1 tablet (500 mg) by

 oral route every 8 hours for 7 days     

 

                furosemide 40 mg oral tablet    2016      take 1 tablet (40 mg) by oral

 route once daily                        

 

                Cipro 500 mg oral tablet    2016       take 1 tablet (500 mg) by oral route

 2 times per day for 5 days              

 

                Bactrim -160 mg oral tablet    2016        take 1 tablet by oral route

 every 12 hours for 7 days               

 

                metoclopramide HCl 10 mg oral tablet    2017       take 1 tablet by oral

 route 2 times a day for 50 days         

 

                Macrobid 100 mg oral capsule    2017       take 1 capsule (100 mg) by oral

 route 2 times per day with food for 7 days     

 

                Augmentin 875-125 mg oral tablet    2017       take 1 tablet by oral route

 every 12 hours for 7 days               

 

                amoxicillin 500 mg oral tablet    2017       take 1 tablet (500 mg) by oral

 route every 12 hours for 7 days         

 

                ciprofloxacin HCl 500 mg oral tablet    2017       take 1 tablet (500 mg)

 by oral route every 12 hours for 5 days     

 

                cefuroxime axetil 500 mg oral tablet    2017        take 1 tablet (500 mg)

 by oral route 2 times per day for 10 days     









                                        Discontinued 

 

             Name         Start Date    Discontinued Date    SIG          Comments

 

                Tylenol 325 mg oral tablet                    2013        take 1 - 2 tablets (325 -650 mg) by oral

 route every 4-6 hours as needed         

 

                Calcium 600 + D(3) 600 mg(1,500mg) -400 unit oral tablet                    2011       take 1 tablet

 by oral route 2 times a day            no longer taking

 

                Vitamin B-12 1,000 mcg oral tablet extended release    2010       take 1

 tablet by oral route daily             no longer taking

 

                Antifungal (clotrimazole) 1 % topical cream    2010       apply to the 

affected and surrounding areas of skin by topical route 2 times per day morning 
and evening                              

 

                sertraline 100 mg oral tablet    5/10/2011       2011       take 2 tablets (200 mg) by 

oral route once daily                   discontinued by Dr. Serrano

 

                mirtazapine 15 mg oral tablet                    2011        take 1 tablet (15 mg) by oral route 

once daily before bedtime               Dr. Serrano

 

                mirtazapine 15 mg oral tablet                    2011        take 1 tablet (15 mg) by oral route 

once daily before bedtime               dc'd by Dr. Serrano

 

                Pristiq 50 mg oral tablet extended release 24 hr                    2013        take 1 tablet (50

 mg) by oral route once daily           Dr. Serrano

 

                Pristiq 50 mg oral tablet extended release 24 hr                    2013        take 1 tablet (50

 mg) by oral route once daily           dose updated

 

                Vitamin B-12 1,000 mcg/mL injection solution    2011        inject 1 milliliter

 (1,000 mcg) by intramuscular route once a month    on list already

 

                    syringe with needle 1 mL 25 gauge x 1" miscellaneous syringe    2011

                          use for injection once a month     

 

                clotrimazole 1 % topical cream    2011        apply to the affected and surrounding

 areas of skin by topical route 2 times per day in the morning and evening     

 

                Vitamin D2 50,000 unit oral capsule    2011        take 1 capsule (50,000

 unit) by oral route once weekly        generic on list

 

                Pravachol 40 mg oral tablet    2012        take 1 tablet (40 mg) by oral 

route once daily for 90 days            generic on list

 

                lithium carbonate 300 mg oral capsule    2012        take 1 capsule by oral

 route daily                            dose updated

 

                Pristiq 100 mg oral tablet extended release 24 hr                    4/10/2012       take 1 and 1/2 

tablet (150 mg) by oral route once daily    Mental Health provider

 

                Pristiq 100 mg oral tablet extended release 24 hr                    4/10/2012       take 1 and 1/2 

tablet (150 mg) by oral route once daily    Discontinued by Dr Efrain Knight at [facility]

 

                hydroxyzine HCl 50 mg oral tablet    10/16/2014      2015       take 1 tablet (50 mg) 

by oral route at bedtime                 

 

                lithium carbonate 300 mg oral capsule    2015       take 1 capsule (300

 mg) by oral route 2 for 30 days         

 

                fluconazole 100 mg oral tablet    2015       12/3/2015       take 1 tablet (100 mg) by 

oral route once a week                   

 

                ketoconazole 2 % topical cream    2015       12/3/2015       apply to the affected area(s)

 by topical route 2 times per day        

 

                prednisone 10 mg oral tablet    12/3/2015       2016        take 2 tablets (20 mg) by oral

 route once daily for 4 days 1 tablet daily for 4 days 0.5 tablet daily for 4 
days                                     

 

                triamcinolone acetonide 0.1 % topical cream    2016       apply a thin layer

 to the affected area(s) by topical route 2 times per day     

 

                Cipro 500 mg oral tablet    1/15/2017       2017       take 1 tablet (500 mg) by oral route

 every 12 hours for 10 days              







Problem List







                    Description         Status              Onset

 

                    Artificial opening status; colostomy    Active               

 

                    Bipolar disorder, unspecified    Active               

 

                    Hyperlipidemia      Active               

 

                    Peritoneal Neoplasm, Malignant    Active               

 

                    Anemia, Pernicious    Active               

 

                    Arthritis unspecified    Active               

 

                    B12 deficiency      Active               







Vital Signs







      Date    Time    BP-Sys(mm[Hg]    BP-Lynn(mm[Hg])    HR(bpm)    RR(rpm)    Temp    WT    HT    HC    BMI

                    BSA                 BMI Percentile      O2 Sat(%)

 

        2017    1:34:00 PM    118 mmHg    62 mmHg    122 bpm    18 rpm    97.8 F    161.375 lbs    

69 in                     23.83 kg/m2    1.89 m2                   96 %

 

        2017    3:05:00 PM    134 mmHg    70 mmHg    70 bpm    20 rpm    97.4 F    181 lbs    69 in

                          26.7288 kg/m    1.9992 m                 98 %

 

        2017    11:07:00 AM    124 mmHg    64 mmHg    62 bpm    17 rpm    98.2 F    181.2 lbs    69

 in                       26.76 kg/m2    2.00 m2                   98 %

 

        1/15/2017    3:34:00 PM    148 mmHg    89 mmHg    69 bpm    20 rpm    98.2 F    179 lbs    69 in

                          26.4334 kg/m    1.9882 m                 98 %

 

       2017    1:51:00 PM    160 mmHg    90 mmHg    100 bpm    20 rpm    96.5 F    179 lbs             

                                                                98 %

 

       2016    3:11:00 PM    134 mmHg    76 mmHg    80 bpm    20 rpm    98 F    163 lbs    69 in     

                24.0706 kg/m    1.8972 m                      98 %

 

        2016    2:04:00 PM    142 mmHg    86 mmHg    68 bpm    16 rpm    98.5 F    166 lbs    63 in

                          29.41 kg/m2    1.83 m2                   100 %

 

        2016    11:27:00 AM    148 mmHg    78 mmHg    90 bpm    20 rpm    98.2 F    153 lbs    69 in

                          22.5939 kg/m    1.8381 m                 96 %

 

        12/3/2015    9:50:00 AM    132 mmHg    70 mmHg    62 bpm    16 rpm    97.9 F    145 lbs    69 in

                          21.41 kg/m2    1.79 m2                   100 %

 

        2015    8:52:00 AM    132 mmHg    68 mmHg    52 bpm    20 rpm    97.8 F    141 lbs    69 in

                          20.8218 kg/m    1.7645 m                 100 %

 

        2015    3:25:00 PM    120 mmHg    62 mmHg    72 bpm    16 rpm    98.1 F    136 lbs    69 in

                          20.08 kg/m2    1.73 m2                   98 %

 

       3/23/2015    2:55:00 PM    130 mmHg    76 mmHg    68 bpm    18 rpm    97 F    140 lbs    69 in    

                20.6742 kg/m    1.7583 m                      98 %

 

        10/16/2014    11:11:00 AM    120 mmHg    66 mmHg    77 bpm    20 rpm    98 F    130 lbs    69 in

                          19.20 kg/m2    1.69 m2                   100 %

 

        2014    3:21:00 PM    130 mmHg    66 mmHg    63 bpm    18 rpm    97.2 F    160 lbs    69 in

                          23.6276 kg/m    1.8797 m                 99 %

 

        2013    10:35:00 AM    132 mmHg    70 mmHg    66 bpm    20 rpm    98.1 F    157 lbs    69 in

                          23.18 kg/m2    1.86 m2                    

 

        2013    1:29:00 PM    132 mmHg    70 mmHg    76 bpm    18 rpm    98.2 F    166 lbs    69 in 

                          24.5137 kg/m    1.9146 m                  

 

       2013    2:46:00 PM    128 mmHg    70 mmHg    76 bpm    16 rpm    98 F    160 lbs    69 in     

                23.63 kg/m2     1.88 m2                          

 

        2011    8:49:00 AM    128 mmHg    78 mmHg    70 bpm    18 rpm    97.9 F    164 lbs    69 in

                          24.2183 kg/m    1.903 m                  

 

     2011    1:31:00 PM    132 mmHg    68 mmHg    84 bpm         97 F    167 lbs                        

                                         

 

        2011    9:09:00 AM    128 mmHg    70 mmHg    72 bpm    18 rpm    98.2 F    163 lbs    64 in 

                          27.9786 kg/m    1.8272 m                  

 

       2011    10:01:00 AM    132 mmHg    70 mmHg    72 bpm    18 rpm    98.2 F    154 lbs             

                                                                 

 

       2011    2:47:00 PM    128 mmHg    70 mmHg    72 bpm    18 rpm    97.8 F    156 lbs             

                                                                 

 

       5/10/2011    3:16:00 PM    144 mmHg    80 mmHg    72 bpm    18 rpm    98.2 F    158 lbs             

                                                                 

 

        2011    10:11:00 AM    132 mmHg    70 mmHg    70 bpm    18 rpm    98.2 F    168 lbs    69 in

                          24.809 kg/m    1.9261 m                  

 

        4/15/2011    10:52:00 AM    110 mmHg    60 mmHg    75 bpm    16 rpm    97.5 F    172.375 lbs    

69 in                     25.46 kg/m2    1.95 m2                   100 %

 

        2011    11:43:00 AM    120 mmHg    82 mmHg    75 bpm    16 rpm    97.2 F    178.5 lbs    69

 in                       26.3596 kg/m    1.9854 m                 100 %

 

        10/15/2010    1:32:00 PM    120 mmHg    70 mmHg    80 bpm    18 rpm    96.6 F    177 lbs    69 in

                          26.14 kg/m2    1.98 m2                   100 %

 

        2010    3:50:00 PM    168 mmHg    100 mmHg    82 bpm    18 rpm    97.8 F    177.5 lbs    69

 in                       26.2119 kg/m    1.9798 m                 97 %

 

        2010    1:21:00 PM    140 mmHg    80 mmHg    59 bpm    16 rpm    97.6 F    173.25 lbs    69 

in                        25.58 kg/m2    1.96 m2                   100 %

 

        2010    3:02:00 PM    140 mmHg    80 mmHg    61 bpm    16 rpm    97.6 F    173.125 lbs    69

 in                       25.5658 kg/m    1.9553 m                 99 %

 

        2010    1:23:00 PM    130 mmHg    80 mmHg    66 bpm    16 rpm    96.8 F    173 lbs    69 in 

                          25.55 kg/m2    1.95 m2                   100 %

 

        2010    12:58:00 PM    130 mmHg    88 mmHg    75 bpm    16 rpm    98.4 F    172.25 lbs    69

 in                       25.4366 kg/m    1.9503 m                 100 %







Social History







                    Name                Description         Comments

 

                    denies alcohol use                         

 

                    denies smoking                           

 

                    Denies illicit substance abuse                         

 

                    retired                                 direct care

 

                    Single                                   

 

                    Exercises regularly                         

 

                    Attended some college                         

 

                    Tobacco             Never smoker         







History of Procedures







                    Date Ordered        Description         Order Status

 

                    2010 12:00 AM    COMPREHEN METABOLIC PANEL    Reviewed

 

                    2010 12:00 AM    COMPLETE CBC W/AUTO DIFF WBC    Reviewed

 

                    2010 12:00 AM    LIPID PANEL         Reviewed

 

                          2015 12:00 AM        B12 Injection, Up to 1000 Mcg NDC#3443-2710-61 [facility] 

                                        Reviewed

 

                    2011 12:00 AM    MAMMOGRAM SCREENING    Reviewed

 

                    2011 12:00 AM    CYTOPATH C/V THIN LAYER    Reviewed

 

                    2011 12:00 AM    B12 Injection 1 cc NDC#[address]-17    Reviewed

 

                    2015 12:00 AM    THER/PROPH/DIAG INJ SC/IM    Reviewed

 

                    2015 12:00 AM    B12 Injection, Up to 1000 Mcg NDC#8467-0589-01    Reviewed

 

                    2011 12:00 AM    THER/PROPH/DIAG INJ SC/IM    Reviewed

 

                    2011 12:00 AM    B12 Injection([place]) Ndc#3684-0321-93-    Reviewed

 

                    2015 12:00 AM    THER/PROPH/DIAG INJ SC/IM    Reviewed

 

                    2015 12:00 AM    B12 Injection, Up to 1000 Mcg NDC#1097-1660-34    Reviewed

 

                    10/20/2011 12:00 AM    THER/PROPH/DIAG INJ SC/IM    Reviewed

 

                    10/20/2011 12:00 AM    B12 Injection([place]) Ndc#4120-8174-68-    Reviewed

 

                    2016 12:00 AM    THER/PROPH/DIAG INJ SC/IM    Reviewed

 

                    2016 12:00 AM    B12 Injection, Up to 1000 Mcg NDC#4816-2068-34    Reviewed

 

                    3/14/2016 12:00 AM    VITAMIN B-12        Reviewed

 

                    3/15/2016 12:00 AM    THER/PROPH/DIAG INJ SC/IM    Reviewed

 

                    3/15/2016 12:00 AM    B12 Injection, Up to 1000 Mcg NDC#8296-7441-79    Reviewed

 

                    2011 12:00 AM    ***Immunization administration, Medicare flu    Reviewed

 

                    2011 12:00 AM    Fluzone ** MEDICARE Only **    Reviewed

 

                    2011 12:00 AM    THER/PROPH/DIAG INJ SC/IM    Reviewed

 

                    2011 12:00 AM    B12 Injection ([employer] [facility]) Ndc#9564-8120-60    Reviewed

 

                    2016 12:00 AM    B12 Injection, Up to 1000 Mcg NDC#1694-0647-66 [facility]    

Reviewed

 

                    2016 12:00 AM    TTE W/DOPPLER COMPLETE    Reviewed

 

                    2016 12:00 AM    EXTREMITY STUDY     Reviewed

 

                          2016 12:00 AM        B12 Injection, Up to 1000 Mcg NDC#2050-5803-79 [facility] 

                                        Reviewed

 

                    2016 12:00 AM    THER/PROPH/DIAG INJ SC/IM    Reviewed

 

                    2016 12:00 AM    B12 Injection, Up to 1000 Mcg NDC#2703-2962-26    Reviewed

 

                    2016 12:00 AM    THER/PROPH/DIAG INJ SC/IM    Reviewed

 

                    2012 12:00 AM    B12 Injection([place]) Ndc#1834-3389-75-    Reviewed

 

                    2016 12:00 AM    B12 Injection, Up to 1000 Mcg NDC#1015-2317-72    Reviewed

 

                    2016 12:00 AM    THER/PROPH/DIAG INJ SC/IM    Reviewed

 

                    2012 12:00 AM    THER/PROPH/DIAG INJ SC/IM    Reviewed

 

                    2012 12:00 AM    B12 Injection ([employer]) Ndc#7833-7932-27    Reviewed

 

                    2016 12:00 AM    THER/PROPH/DIAG INJ SC/IM    Reviewed

 

                    2016 12:00 AM    B12 Injection, Up to 1000 Mcg NDC#1791-0562-48    Reviewed

 

                    2016 12:00 AM    B12 Injection, Up to 1000 Mcg NDC#3057-2488-30    Reviewed

 

                    2016 12:00 AM    THER/PROPH/DIAG INJ SC/IM    Reviewed

 

                    2012 12:00 AM    THER/PROPH/DIAG INJ SC/IM    Reviewed

 

                    2012 12:00 AM    B12 Injection([place]) Ndc#2083-6189-50-    Reviewed

 

                    12/15/2016 12:00 AM    B12 Injection, Up to 1000 Mcg NDC#4680-3335-29    Reviewed

 

                    12/15/2016 12:00 AM    THER/PROPH/DIAG INJ SC/IM    Reviewed

 

                    2016 12:00 AM    URNLS DIP STICK/TABLET RGNT AUTO W/O MICROSCOPY    Reviewed

 

                    1/3/2017 12:00 AM    URNLS DIP STICK/TABLET RGNT AUTO W/O MICROSCOPY    Reviewed

 

                    2017 12:00 AM    URINE CULTURE/COLONY COUNT    Reviewed

 

                    2017 12:00 AM    Rocephin 1 gram Injection, [facility]    Reviewed

 

                    2017 12:00 AM    THERAPEUTIC PROPHYLACTIC/DX INJECTION SUBQ/IM    Reviewed

 

                    2017 12:00 AM    B12 1000mcg Injection, [facility]    Reviewed

 

                    5/3/2012 12:00 AM    THER/PROPH/DIAG INJ SC/IM    Reviewed

 

                    5/3/2012 12:00 AM    B12 Injection([place]) Ndc#0263-5318-44-    Reviewed

 

                    2017 12:00 AM    THERAPEUTIC PROPHYLACTIC/DX INJECTION SUBQ/IM    Reviewed

 

                    2017 12:00 AM    B12 1000mcg Injection, [facility]    Reviewed

 

                    2017 12:00 AM    MRI BRAIN STEM W/O & W/DYE    Reviewed

 

                    2017 12:00 AM    VITAMIN B-12        Reviewed

 

                    2017 12:00 AM    Speech Therapy Consult    Reviewed

 

                    2017 12:00 AM    ASSAY OF CREATININE    Reviewed

 

                    2012 12:00 AM    IMMUNOTHERAPY INJECTIONS    Reviewed

 

                    2012 12:00 AM    B12 Injection([place]) Ndc#0089-4341-73-    Reviewed

 

                    2017 12:00 AM    URINALYSIS AUTO W/SCOPE    Reviewed

 

                    2012 12:00 AM    THER/PROPH/DIAG INJ SC/IM    Reviewed

 

                    2012 12:00 AM    B12 Injection, Up to 1000 Mcg NDC#3066-1191-86    Reviewed

 

                    2017 12:00 AM    URINALYSIS AUTO W/SCOPE    Reviewed

 

                    2017 2:18 PM    URINALYSIS AUTO W/O SCOPE    Reviewed

 

                    2017 12:00 AM    URINE CULTURE/COLONY COUNT    Reviewed

 

                    2017 12:00 AM    B12 1000mcg Injection    Reviewed

 

                    2017 12:00 AM    URNLS DIP STICK/TABLET RGNT AUTO W/O MICROSCOPY    Reviewed

 

                    2017 12:00 AM    METABOLIC PANEL TOTAL CA    Reviewed

 

                    2017 12:00 AM    URNLS DIP STICK/TABLET RGNT AUTO W/O MICROSCOPY    Reviewed

 

                    2012 12:00 AM    THER/PROPH/DIAG INJ SC/IM    Reviewed

 

                    2012 12:00 AM    B12 Injection, Up to 1000 Mcg NDC#6703-3395-38    Reviewed

 

                    2012 12:00 AM    THER/PROPH/DIAG INJ SC/IM    Reviewed

 

                    2012 12:00 AM    B12 Injection, Up to 1000 Mcg NDC#3015-3328-30    Reviewed

 

                    10/16/2012 12:00 AM    THER/PROPH/DIAG INJ SC/IM    Reviewed

 

                    10/16/2012 12:00 AM    B12 Injection, Up to 1000 Mcg NDC#5035-2468-57    Reviewed

 

                    2010 12:00 AM    COMPREHEN METABOLIC PANEL    Reviewed

 

                    2010 12:00 AM    COMPLETE CBC W/AUTO DIFF WBC    Reviewed

 

                    2010 12:00 AM    LIPID PANEL         Reviewed

 

                    2013 12:00 AM    Flu Injection 3 Years And Above NDC# [address]-73  [facility]    Reviewed



 

                    2013 12:00 AM    COMPLETE CBC W/AUTO DIFF WBC    Reviewed

 

                    2013 12:00 AM    ASSAY OF LITHIUM    Reviewed

 

                    2013 12:00 AM    METABOLIC PANEL TOTAL CA    Reviewed

 

                    4/3/2013 12:00 AM    THER/PROPH/DIAG INJ SC/IM    Reviewed

 

                    4/3/2013 12:00 AM    B12 Injection, Up to 1000 Mcg NDC#1385-4667-94    Reviewed

 

                    2013 12:00 AM    THER/PROPH/DIAG INJ SC/IM    Reviewed

 

                    2013 12:00 AM    B12 Injection, Up to 1000 Mcg NDC#0701-9708-36    Reviewed

 

                    2013 12:00 AM    THER/PROPH/DIAG INJ SC/IM    Reviewed

 

                    2013 12:00 AM    B12 Injection, Up to 1000 Mcg NDC#4129-0855-34    Reviewed

 

                    2013 12:00 AM    LIPID PANEL         Reviewed

 

                    2013 12:00 AM    VITAMIN D 25 HYDROXY    Reviewed

 

                    2013 12:00 AM    THER/PROPH/DIAG INJ SC/IM    Reviewed

 

                    2013 12:00 AM    B12 Injection, Up to 1000 Mcg NDC#0284-6883-41    Reviewed

 

                    2013 12:00 AM    THER/PROPH/DIAG INJ SC/IM    Reviewed

 

                    3/6/2014 12:00 AM    THER/PROPH/DIAG INJ SC/IM    Reviewed

 

                    2014 12:00 AM    THER/PROPH/DIAG INJ SC/IM    Reviewed

 

                    2014 12:00 AM    B12 Injection, Up to 1000 Mcg NDC#0812-4574-07    Reviewed

 

                    2010 12:00 AM    SKIN FUNGI CULTURE    Reviewed

 

                    10/9/2010 12:00 AM    COMPREHEN METABOLIC PANEL    Reviewed

 

                    10/9/2010 12:00 AM    LIPID PANEL         Reviewed

 

                    2010 12:00 AM    THER/PROPH/DIAG INJ SC/IM    Reviewed

 

                    2010 12:00 AM    B12 Injection Ndc#[address]-01 ([place])    Reviewed

 

                    2010 12:00 AM    THER/PROPH/DIAG INJ SC/IM    Reviewed

 

                    2010 12:00 AM    Kenalog 40 Mg Im-Ndc#[address]-28 ([place])    Reviewed

 

                    10/15/2010 12:00 AM    FLU VACCINE 3 YRS & > IM    Reviewed

 

                    10/15/2010 12:00 AM    Admin.Of M/C Cov.Vaccine-Flu Vacc.    Reviewed

 

                    1/15/2011 12:00 AM    COMPLETE CBC W/AUTO DIFF WBC    Reviewed

 

                    1/15/2011 12:00 AM    COMPREHEN METABOLIC PANEL    Reviewed

 

                    1/15/2011 12:00 AM    LIPID PANEL         Reviewed

 

                    2014 12:00 AM    MAMMOGRAM SCREENING    Reviewed

 

                    2014 12:00 AM    Screening mammography, bilateral    Reviewed

 

                    7/10/2014 12:00 AM    THER/PROPH/DIAG INJ SC/IM    Reviewed

 

                    7/10/2014 12:00 AM    B12 Injection, Up to 1000 Mcg NDC#2186-4499-22    Reviewed

 

                    2011 12:00 AM    COMPLETE CBC W/AUTO DIFF WBC    Reviewed

 

                    2011 12:00 AM    COMPREHEN METABOLIC PANEL    Reviewed

 

                    2011 12:00 AM    LIPID PANEL         Reviewed

 

                    2014 12:00 AM    B12 Injection, Up to 1000 Mcg NDC#1197-5946-58    Reviewed

 

                    10/19/2014 12:00 AM    MAMMOGRAM SCREENING    Reviewed

 

                    10/19/2014 12:00 AM    Screening mammography, bilateral    Reviewed

 

                    10/16/2014 12:00 AM    B12 Injection, Up to 1000 Mcg NDC#0500-1948-09    Reviewed

 

                    10/16/2014 12:00 AM    COMPLETE CBC W/AUTO DIFF WBC    Reviewed

 

                    10/16/2014 12:00 AM    COMPREHEN METABOLIC PANEL    Reviewed

 

                    10/16/2014 12:00 AM    IMMUNOASSAY TUMOR     Reviewed

 

                    10/16/2014 12:00 AM    LIPID PANEL         Reviewed

 

                    10/16/2014 12:00 AM    ASSAY OF LITHIUM    Reviewed

 

                    10/16/2014 12:00 AM    MAMMOGRAM SCREENING    Reviewed

 

                    2011 12:00 AM    ASSAY OF PARATHORMONE    Reviewed

 

                    2011 12:00 AM    VITAMIN D 25 HYDROXY    Reviewed

 

                    2011 12:00 AM    ASSAY OF LITHIUM    Reviewed

 

                    2011 12:00 AM    METABOLIC PANEL TOTAL CA    Reviewed

 

                    2011 12:00 AM    CT HEAD/BRAIN W/O & W/DYE    Reviewed

 

                    3/23/2015 12:00 AM    PNEUMOCOCCAL VACC 13 GLENDY IM    Reviewed

 

                    3/23/2015 12:00 AM    Vitamin B12 injection    Reviewed

 

                    2011 12:00 AM    ASSAY OF LITHIUM    Reviewed

 

                    2011 12:00 AM    B12 Injection Ndc#[address]-73  [place]    Reviewed

 

                    2015 12:00 AM    THER/PROPH/DIAG INJ SC/IM    Reviewed

 

                    2015 12:00 AM    B12 Injection, Up to 1000 Mcg NDC#8226-8338-11    Reviewed

 

                    2015 12:00 AM    COMPLETE CBC W/AUTO DIFF WBC    Reviewed

 

                    2015 12:00 AM    COMPREHEN METABOLIC PANEL    Reviewed

 

                    2015 12:00 AM    LIPID PANEL         Reviewed

 

                    2015 12:00 AM    ASSAY OF LITHIUM    Reviewed

 

                    2011 12:00 AM    VIT D 1 25-DIHYDROXY    Reviewed

 

                    2011 12:00 AM    VITAMIN B-12        Reviewed

 

                    2015 12:00 AM    B12 Injection, Up to 1000 Mcg NDC#7532-1091-24    Reviewed

 

                    2015 12:00 AM    THER/PROPH/DIAG INJ SC/IM    Reviewed

 

                    2015 12:00 AM    B12 Injection, Up to 1000 Mcg NDC#4794-5508-10    Reviewed

 

                    2011 12:00 AM    THER/PROPH/DIAG INJ SC/IM    Reviewed

 

                    2011 12:00 AM    B12 Injection ([employer] [facility]) Ndc#6587-7421-77    Reviewed

 

                    2015 12:00 AM    THER/PROPH/DIAG INJ SC/IM    Reviewed

 

                    2015 12:00 AM    B12 Injection, Up to 1000 Mcg NDC#4187-8665-49    Reviewed







Results Summary







                          Date and Description      Results

 

                          2004 12:00 AM        Colonoscopy-Women and Men over 50 Normal 

 

                          2008 12:00 AM         Pap Smear Declined 

 

                          10/7/2009 12:00 AM        Cholest Cry Stone Ql .0 %LDLc SerPl-mCnc 123.0 mg/dLHDLc

 SerPl-mCnc 34.0 mg/dLTrigl SerPl-mCnc 190.0 mg/dLGlucose SerPl-mCnc 78.0 mg/dL

 

                          2009 12:00 AM        Mammogram -Women over 40 Normal HIV1+2 Ab Ser Ql no risk 

 

                          2010 8:47 AM         Dexa Bone Scan Refused Aspirin reccommended Contraindication 



 

                          2010 8:48 AM         Depression Done 

 

                          2010 12:00 AM         Foot Exam-Diabetic Done 

 

                          2010 12:00 AM         Cholest Cry Stone Ql .0 %LDLc SerPl-mCnc 126.0 mg/dLGlucose

 SerPl-mCnc 102.0 mg/dL

 

                          2010 8:45 AM          TRIGLYCERIDES 122.0 mg/dLCHOLESTEROL 186.0 mg/dLHDL 36.0 mg/dLTOT

 CHOL/HDL 5.2 LDL (CALC) 126.0 mg/dLGLUCOSE 102.0 mg/dLSODIUM 143.0 
mmol/LPOTASSIUM 3.70 mmol/LCHLORIDE 111.0 mmol/LCO2 23.0 mmol/LBUN 10.0 
mg/dLCREATININE 0.80 mg/dLSGOT/AST 12.0 IU/LSGPT/ALT 11.0 IU/LALK PHOS 65.0 
IU/LTOTAL PROTEIN 7.20 g/dLALBUMIN 3.90 g/dLTOTAL BILI 0.50 mg/dLCALCIUM 10.20 
mg/dLAGE 59 GFR NonAA 73 GFR AA 88 eGFR >60 mL/min/1.73 m2eGFR AA* >60 WBC 5.7 
RBC 3.26 HGB 10.60 g/dLHCT 31.70 %MCV 97.0 fLMCH 32.50 pgMCHC 33.40 g/dLRDW SD 
47 RDW CV 13.30 %MPV 9.70 fLPLT 287 NRBC# 0.00 NRBC% 0.0 %NEUT 62.90 %%LYMP 
21.80 %%MONO 9.90 %%EOS 5.0 %%BASO 0.40 %#NEUT 3.56 #LYMP 1.23 #MONO 0.56 #EOS 
0.28 #BASO 0.02 MANUAL DIFF NOT IND 

 

                          2010 12:00 AM        Glucose SerPl-mCnc 96.0 mg/dLCholest Cry Stone Ql .0 %LDLc

 SerPl-mCnc 146.0 mg/dL

 

                          2010 8:26 AM         TRIGLYCERIDES 106.0 mg/dLCHOLESTEROL 199.0 mg/dLHDL 32.0 mg/dLTOT

 CHOL/HDL 6.2 LDL (CALC) 146.0 mg/dLGLUCOSE 96.0 mg/dLSODIUM 143.0 
mmol/LPOTASSIUM 4.0 mmol/LCHLORIDE 113.0 mmol/LCO2 24.0 mmol/LBUN 13.0 
mg/dLCREATININE 1.0 mg/dLSGOT/AST 11.0 IU/LSGPT/ALT 6.0 IU/LALK PHOS 56.0 
IU/LTOTAL PROTEIN 6.60 g/dLALBUMIN 3.80 g/dLTOTAL BILI 0.50 mg/dLCALCIUM 9.30 
mg/dLAGE 59 GFR NonAA 57 GFR AA 69 eGFR 57 eGFR AA* >60 

 

                          10/6/2010 12:00 AM        Cholest Cry Stone Ql .0 %LDLc SerPl-mCnc 111.0 mg/dLGlucose

 SerPl-mCnc 81.0 mg/dL

 

                          10/6/2010 2:45 PM         TRIGLYCERIDES 123.0 mg/dLCHOLESTEROL 178.0 mg/dLHDL 42.0 mg/dLTOT

 CHOL/HDL 4.2 LDL (CALC) 111.0 mg/dLGLUCOSE 81.0 mg/dLSODIUM 139.0 
mmol/LPOTASSIUM 4.10 mmol/LCHLORIDE 106.0 mmol/LCO2 24.0 mmol/LBUN 13.0 
mg/dLCREATININE 0.90 mg/dLSGOT/AST 13.0 IU/LSGPT/ALT 11.0 IU/LALK PHOS 61.0 
IU/LTOTAL PROTEIN 7.10 g/dLALBUMIN 3.90 g/dLTOTAL BILI 0.30 mg/dLCALCIUM 9.30 
mg/dLAGE 60 GFR NonAA 64 GFR AA 78 eGFR >60 mL/min/1.73 m2eGFR AA* >60 WBC 6.9 
RBC 3.59 HGB 11.50 g/dLHCT 35.30 %MCV 98.0 fLMCH 32.0 pgMCHC 32.60 g/dLRDW SD 46
 RDW CV 12.90 %MPV 9.90 fLPLT 311 NRBC# 0.00 NRBC% 0.0 %NEUT 64.90 %%LYMP 22.50 
%%MONO 7.20 %%EOS 5.10 %%BASO 0.30 %#NEUT 4.45 #LYMP 1.54 #MONO 0.49 #EOS 0.35 
#BASO 0.02 MANUAL DIFF NOT IND 

 

                          2011 12:00 AM         Mammogram -Women over 40 Ordered 

 

                          2011 10:25 AM        TRIGLYCERIDES 111.0 mg/dLCHOLESTEROL 195.0 mg/dLHDL 43.0 mg/dLTOT

 CHOL/HDL 4.5 LDL (CALC) 130.0 mg/dLWBC 5.3 RBC 3.76 HGB 12.0 g/dLHCT 37.80 %MCV
 101.0 fLMCH 31.90 pgMCHC 31.70 g/dLRDW SD 47 RDW CV 13.0 %MPV 9.70 fLPLT 259 
NRBC# 0.00 NRBC% 0.0 %NEUT 69.0 %%LYMP 17.60 %%MONO 8.30 %%EOS 4.70 %%BASO 0.40 
%#NEUT 3.63 #LYMP 0.93 #MONO 0.44 #EOS 0.25 #BASO 0.02 MANUAL DIFF NOT IND 
GLUCOSE 102.0 mg/dLSODIUM 146.0 mmol/LPOTASSIUM 4.20 mmol/LCHLORIDE 113.0 
mmol/LCO2 23.0 mmol/LBUN 15.0 mg/dLCREATININE 1.0 mg/dLSGOT/AST 12.0 
IU/LSGPT/ALT 17.0 IU/LALK PHOS 60.0 IU/LTOTAL PROTEIN 6.90 g/dLALBUMIN 4.20 
g/dLTOTAL BILI 0.40 mg/dLCALCIUM 9.70 mg/dLAGE 60 GFR NonAA 57 GFR AA 69 eGFR 57
 eGFR AA* >60 

 

                          2011 11:49 AM        Cholest Cry Stone Ql .0 %LDLc SerPl-mCnc 130.0 mg/dLHDLc

 SerPl-mCnc 43.0 mg/dLTrigl SerPl-mCnc 111.0 mg/dLGlucose SerPl-mCnc 102.0 mg/dL

 

                          2011 11:52 AM        Pap Smear Declined 

 

                          2011 11:28 AM        Lithium 2.080 mmol/LGLUCOSE 102.0 mg/dLSODIUM 135.0 mmol/LPOTASSIUM

 3.90 mmol/LCHLORIDE 106.0 mmol/LCO2 21.0 mmol/LBUN 12.0 mg/dLCREATININE 1.30 
mg/dLCALCIUM 10.70 mg/dLAGE 60 GFR NonAA 42 GFR AA 51 eGFR 42 eGFR AA* 51 

 

                          2011 8:58 AM          Lithium 0.690 mmol/L

 

                          2011 2:38 PM         VITAMIN B12 3483.0 pg/mL

 

                          2013 3:35 PM          WBC 5.1 RBC 3.73 HGB 11.70 g/dLHCT 36.40 %MCV 98.0 fLMCH 31.40

 pgMCHC 32.10 g/dLRDW SD 47 RDW CV 13.10 %MPV 9.80 fLPLT 224 NRBC# 0.00 NRBC% 
0.0 %NEUT 66.80 %%LYMP 19.10 %%MONO 9.0 %%EOS 4.90 %%BASO 0.20 %#NEUT 3.42 #LYMP
 0.98 #MONO 0.46 #EOS 0.25 #BASO 0.01 MANUAL DIFF NOT IND GLUCOSE 88.0 
mg/dLSODIUM 141.0 mmol/LPOTASSIUM 4.10 mmol/LCHLORIDE 110.0 mmol/LCO2 22.0 
mmol/LBUN 22.0 mg/dLCREATININE 1.10 mg/dLCALCIUM 9.80 mg/dLAGE 62 GFR NonAA 50 
GFR AA 61 eGFR 50 eGFR AA* 60 Lithium 0.760 mmol/L

 

                          2013 11:02 AM        TRIGLYCERIDES 106.0 mg/dLCHOLESTEROL 181.0 mg/dLHDL 46.0 mg/dLTOT

 CHOL/HDL 3.9 LDL (CALC) 114.0 mg/dLVITAMIN D 41.10 ng/mL

 

                          10/17/2014 10:10 AM       WBC 5.0 RBC 3.66 HGB 11.60 g/dLHCT 36.80 %.0 fLMCH 31.70

 pgMCHC 31.50 g/dLRDW SD 50 RDW CV 13.50 %MPV 10.10 fLPLT 209 NRBC# 0.00 NRBC% 
0.0 %NEUT 69.20 %%LYMP 21.0 %%MONO 6.40 %%EOS 3.20 %%BASO 0.20 %#NEUT 3.46 #LYMP
 1.05 #MONO 0.32 #EOS 0.16 #BASO 0.01 MANUAL DIFF NOT IND GLUCOSE 100.0 
mg/dLSODIUM 148.0 mmol/LPOTASSIUM 3.90 mmol/LCHLORIDE 114.0 mmol/LCO2 26.0 
mmol/LBUN 12.0 mg/dLCREATININE 1.20 mg/dLSGOT/AST 9.0 IU/LSGPT/ALT <6 IU/LALK 
PHOS 82.0 IU/LTOTAL PROTEIN 6.90 g/dLALBUMIN 4.0 g/dLTOTAL BILI 0.40 
mg/dLCALCIUM 10.50 mg/dLAGE 64 GFR NonAA 45 GFR AA 55 eGFR 45 eGFR AA* 55 
TRIGLYCERIDES 96.0 mg/dLCHOLESTEROL 155.0 mg/dLHDL 38.0 mg/dLTOT CHOL/HDL 4.1 
LDL (CALC) 98.0 mg/dLLithium 0.850 mmol/LCancer Antigen (CA) 125 8.30 U/mL

 

                          2015 10:25 AM        Lithium 0.790 mmol/LWBC 4.8 RBC 3.44 HGB 11.0 g/dLHCT 35.20 

%.0 fLMCH 32.0 pgMCHC 31.30 g/dLRDW SD 53 RDW CV 14.0 %MPV 9.30 fLPLT 210
 NRBC# 0.00 NRBC% 0.0 %NEUT 70.80 %%LYMP 17.20 %%MONO 8.10 %%EOS 3.50 %%BASO 
0.40 %#NEUT 3.41 #LYMP 0.83 #MONO 0.39 #EOS 0.17 #BASO 0.02 MANUAL DIFF NOT IND 
TRIGLYCERIDES 107.0 mg/dLCHOLESTEROL 174.0 mg/dLHDL 43.0 mg/dLTOT CHOL/HDL 4.0 
LDL (CALC) 110.0 mg/dLGLUCOSE 90.0 mg/dLSODIUM 145.0 mmol/LPOTASSIUM 3.80 
mmol/LCHLORIDE 115.0 mmol/LCO2 24.0 mmol/LBUN 17.0 mg/dLCREATININE 1.30 
mg/dLSGOT/AST 18.0 IU/LSGPT/ALT 17.0 IU/LALK PHOS 56.0 IU/LTOTAL PROTEIN 6.70 
g/dLALBUMIN 3.90 g/dLTOTAL BILI 0.40 mg/dLCALCIUM 9.80 mg/dLAGE 64 GFR NonAA 41 
GFR AA 50 eGFR 41 eGFR AA* 50 

 

                          2015 8:50 AM        WBC 5.8 RBC 3.29 HGB 10.70 g/dLHCT 34.0 %.0 fLMCH 32.50

 pgMCHC 31.50 g/dLRDW SD 52 RDW CV 13.60 %MPV 9.60 fLPLT 223 NRBC# 0.00 NRBC% 
0.0 %NEUT 69.60 %%LYMP 18.90 %%MONO 8.50 %%EOS 2.80 %%BASO 0.20 %#NEUT 4.03 
#LYMP 1.09 #MONO 0.49 #EOS 0.16 #BASO 0.01 MANUAL DIFF NOT IND Lithium 0.620 
mmol/LGLUCOSE 83.0 mg/dLSODIUM 139.0 mmol/LPOTASSIUM 3.90 mmol/LCHLORIDE 109.0 
mmol/LCO2 22.0 mmol/LBUN 19.0 mg/dLCREATININE 1.40 mg/dLSGOT/AST 19.0 
IU/LSGPT/ALT 21.0 IU/LALK PHOS 55.0 IU/LTOTAL PROTEIN 6.50 g/dLALBUMIN 3.90 
g/dLTOTAL BILI 0.50 mg/dLCALCIUM 9.60 mg/dLAGE 65 GFR NonAA 38 GFR AA 46 eGFR 38
 eGFR AA* 46 TRIGLYCERIDES 121.0 mg/dLCHOLESTEROL 192.0 mg/dLHDL 51.0 mg/dLTOT 
CHOL/HDL 3.8 .0 mg/dLFREE T4 0.79 TSH 1.210 uIU/mLHemoglobin A1c 5.40 
%Estim. Avg Glu (eAG) 108 

 

                          3/15/2016 8:08 AM         VITAMIN B12 696.0 pg/mL

 

                          3/23/2016 8:26 AM         WBC 7.0 RBC 3.61 HGB 11.80 g/dLHCT 37.70 %.0 fLMCH 32.70

 pgMCHC 31.30 g/dLRDW SD 49 RDW CV 12.50 %MPV 10.0 fLPLT 207 NRBC# 0.00 NRBC% 
0.0 %NEUT 73.60 %%LYMP 16.40 %%MONO 6.60 %%EOS 3.0 %%BASO 0.30 %#NEUT 5.15 #LYMP
 1.15 #MONO 0.46 #EOS 0.21 #BASO 0.02 MANUAL DIFF NOT IND Lithium 0.940 
mmol/LGLUCOSE 108.0 mg/dLSODIUM 143.0 mmol/LPOTASSIUM 4.30 mmol/LCHLORIDE 110.0 
mmol/LCO2 27.0 mmol/LBUN 16.0 mg/dLCREATININE 1.60 mg/dLSGOT/AST 13.0 
IU/LSGPT/ALT 7.0 IU/LALK PHOS 71.0 IU/LTOTAL PROTEIN 6.80 g/dLALBUMIN 4.0 
g/dLTOTAL BILI 0.20 mg/dLCALCIUM 10.40 mg/dLAGE 65 GFR NonAA 32 GFR AA 39 eGFR 
32 eGFR AA* 39 TRIGLYCERIDES 113.0 mg/dLCHOLESTEROL 169.0 mg/dLHDL 42.0 mg/dLTOT
 CHOL/HDL 4.0 LDL (CALC) 104.0 mg/dLFREE T4 0.86 TSH 2.20 uIU/mLHemoglobin A1c 
5.20 %Estim. Avg Glu (eAG) 103 

 

                          3/25/2016 9:17 AM         COLOR YELLOW APPEARANCE CLEAR SPEC GRAV 1.010 pH 7.0 PROTEIN 

NEGATIVE GLUCOSE NEGATIVE mg/dLKETONE NEGATIVE BILIRUBIN NEGATIVE BLOOD NEGATIVE
 NITRITE NEGATIVE [place] SCREEN SMALL MICRO IND? SEE BELOW WBC/HPF 0-5 RBC/HPF 
NEGATIVE CASTS/LPF NEGATIVE /LPFCRYSTALS NEGATIVE MUCOUS THRDS NEGATIVE BACTERIA
 NEGATIVE EPITH CELLS FEW SQUAMOUS /HPFTRICHOMONAS NEGATIVE YEAST NEGATIVE 

 

                          2016 6:00 AM        GLUCOSE 91.0 mg/dLSODIUM 143.0 mmol/LPOTASSIUM 3.60 mmol/LCHLORIDE

 112.0 mmol/LCO2 23.0 mmol/LBUN 22.0 mg/dLCREATININE 1.20 mg/dLSGOT/AST 15.0 
IU/LSGPT/ALT 12.0 IU/LALK PHOS 61.0 IU/LTOTAL PROTEIN 5.40 g/dLALBUMIN 3.10 
g/dLTOTAL BILI 0.40 mg/dLCALCIUM 8.40 mg/dLAGE 66 GFR NonAA 45 GFR AA 55 eGFR 45
 eGFR AA* 55 WBC 3.0 RBC 3.05 HGB 9.80 g/dLHCT 32.10 %.0 fLMCH 32.10 
pgMCHC 30.50 g/dLRDW SD 54 RDW CV 14.20 %MPV 10.10 fLPLT 170 NRBC# 0.00 NRBC% 
0.0 %NEUT 50.70 %%LYMP 32.60 %%MONO 10.50 %%EOS 5.90 %%BASO 0.30 %#NEUT 1.54 
#LYMP 0.99 #MONO 0.32 #EOS 0.18 #BASO 0.01 MANUAL DIFF NOT IND 

 

                          2016 2:09 PM        COLOR YELLOW APPEARANCE CLEAR SPEC GRAV 1.010 pH 5.0 PROTEIN

 30 GLUCOSE NEGATIVE mg/dLKETONE NEGATIVE BILIRUBIN NEGATIVE BLOOD LARGE NITRITE
 NEGATIVE [place] SCREEN MODERATE MICRO INDICATED? SEE BELOW WBC/HPF  RBC/HPF
 20-50 CASTS/LPF NEGATIVE /LPFCRYSTALS NEGATIVE MUCOUS THRDS NEGATIVE BACTERIA 
NEGATIVE EPITH CELLS FEW SQUAMOUS /HPFTRICHOMONAS NEGATIVE YEAST NEGATIVE CULT 
SET UP? YES 

 

                          1/3/2017 4:08 PM          COLOR YELLOW APPEARANCE HAZY SPEC GRAV 1.015 pH 6.0 PROTEIN 30

 GLUCOSE NEGATIVE mg/dLKETONE NEGATIVE BILIRUBIN NEGATIVE BLOOD MODERATE NITRITE
 NEGATIVE [place] SCREEN LARGE MICRO INDICATED? SEE BELOW WBC/-200 RBC/HPF 
5-10 CASTS/LPF NEGATIVE /LPFCRYSTALS NEGATIVE MUCOUS THRDS NEGATIVE BACTERIA 
NEGATIVE EPITH CELLS 1+ SQUAMOUS /HPFTRICHOMONAS NEGATIVE YEAST NEGATIVE CULT 
SET UP? YES 

 

                          2017 4:24 PM         CREATININE 1.50 mg/dLAGE 66 GFR NonAA 35 GFR AA 42 eGFR 35 eGFR

 AA* 42 VITAMIN B12 1324.0 pg/mL

 

                          2017 11:30 AM         GLUCOSE 93.0 mg/dLSODIUM 143.0 mmol/LPOTASSIUM 3.10 mmol/LCHLORIDE

 101.0 mmol/LCO2 29.0 mmol/LBUN 26.0 mg/dLCREATININE 1.50 mg/dLSGOT/AST 23.0 
IU/LSGPT/ALT 13.0 IU/LALK PHOS 66.0 IU/LTOTAL PROTEIN 7.70 g/dLALBUMIN 4.30 
g/dLTOTAL BILI 0.40 mg/dLCALCIUM 10.30 mg/dLAGE 66 GFR NonAA 35 GFR AA 42 eGFR 
35 eGFR AA* 42 TRIGLYCERIDES 147.0 mg/dLCHOLESTEROL 184.0 mg/dLHDL 44.0 mg/dLTOT
 CHOL/HDL 4.2 .0 mg/dLWBC 5.4 RBC 3.98 HGB 12.90 g/dLHCT 40.20 %.0
 fLMCH 32.40 pgMCHC 32.10 g/dLRDW SD 50 RDW CV 13.50 %MPV 9.30 fLPLT 210 NRBC# 
0.00 NRBC% 0.0 %NEUT 54.20 %%LYMP 30.70 %%MONO 9.10 %%EOS 5.20 %%BASO 0.40 
%#NEUT 2.94 #LYMP 1.66 #MONO 0.49 #EOS 0.28 #BASO 0.02 MANUAL DIFF NOT IND FREE 
T4 1.09 COLOR YELLOW APPEARANCE CLEAR SPEC GRAV <=1.005 pH 5.5 PROTEIN NEGATIVE 
GLUCOSE NEGATIVE mg/dLKETONE NEGATIVE BILIRUBIN NEGATIVE BLOOD NEGATIVE NITRITE 
NEGATIVE LEUK SCREEN LARGE MICRO INDICATED? SEE BELOW WBC/HPF 10-20 RBC/HPF 0-5 
CASTS/LPF NEGATIVE /LPFCRYSTALS NEGATIVE MUCOUS THRDS NEGATIVE BACTERIA FEW 
EPITH CELLS 1+ SQUAMOUS /HPFTRICHOMONAS NEGATIVE YEAST NEGATIVE CULT SET UP? YES
 TSH 1.820 uIU/mL

 

                          2017 2:45 PM         COLOR YELLOW APPEARANCE CLEAR SPEC GRAV <=1.005 pH 6.0 PROTEIN

 NEGATIVE GLUCOSE NEGATIVE mg/dLKETONE NEGATIVE BILIRUBIN NEGATIVE BLOOD TRACE-
INTACT NITRITE NEGATIVE LE[place] SCREEN SMALL MICRO INDICATED? SEE BELOW WBC/HPF 0-5
 RBC/HPF NEGATIVE CASTS/LPF NEGATIVE /LPFCRYSTALS NEGATIVE MUCOUS THRDS NEGATIVE
 BACTERIA FEW EPITH CELLS FEW SQUAMOUS /HPFTRICHOMONAS NEGATIVE YEAST NEGATIVE 
CULT SET UP? YES 

 

                          2017 11:22 AM        COLOR YELLOW APPEARANCE CLEAR SPEC GRAV <=1.005 pH 6.5 PROTEIN

 NEGATIVE GLUCOSE NEGATIVE mg/dLKETONE NEGATIVE BILIRUBIN NEGATIVE BLOOD 
NEGATIVE NITRITE NEGATIVE LEUK SCREEN NEGATIVE MICRO INDICATED? NOT INDICATED 

 

                          2017 2:18 PM         Clarity Ur cloudy Color Ur dark yellow Glucose Ur-sCnc negative

 Bilirub Ur Ql Strip negative Ketones Ur Ql Strip negative Sp Gr Ur Qn 1.010 Hgb
 Ur Ql Strip trace-intact pH Ur-LsCnc 6.5 Prot Ur Ql Strip trace Urobilinogen 
Ur-mCnc 0.2 Nitrite Ur Ql Strip negative WBC Est Ur Ql Strip large 

 

                          2017 9:28 AM         GLUCOSE 109.0 mg/dLSODIUM 142.0 mmol/LPOTASSIUM 3.60 mmol/LCHLORIDE

 106.0 mmol/LCO2 23.0 mmol/LBUN 11.0 mg/dLCREATININE 1.30 mg/dLCALCIUM 9.80 
mg/dLAGE 66 GFR NonAA 41 GFR AA 50 eGFR 41 eGFR AA* 50 

 

                          2017 9:52 AM         COLOR YELLOW APPEARANCE CLOUDY SPEC GRAV <=1.005 pH 6.5 PROTEIN

 NEGATIVE GLUCOSE NEGATIVE mg/dLKETONE NEGATIVE BILIRUBIN NEGATIVE BLOOD SMALL 
NITRITE POSITIVE [place] SCREEN LARGE MICRO INDICATED? SEE BELOW WBC/-300 
RBC/HPF 5-10 CASTS/LPF NEGATIVE /LPFCRYSTALS NEGATIVE MUCOUS THRDS NEGATIVE 
BACTERIA 2++ EPITH CELLS 1+ SQUAMOUS /HPFTRICHOMONAS NEGATIVE YEAST NEGATIVE 
CULT SET UP? YES 

 

                          2017 10:41 AM         COLOR YELLOW APPEARANCE CLEAR SPEC GRAV <=1.005 pH 6.0 PROTEIN

 NEGATIVE GLUCOSE NEGATIVE mg/dLKETONE NEGATIVE BILIRUBIN NEGATIVE BLOOD 
NEGATIVE NITRITE NEGATIVE LE[place] SCREEN TRACE MICRO INDICATED? SEE BELOW WBC/HPF 
0-5 RBC/HPF RARE CASTS/LPF NEGATIVE /LPFCRYSTALS NEGATIVE MUCOUS THRDS NEGATIVE 
BACTERIA FEW EPITH CELLS 1+ SQUAMOUS /HPFTRICHOMONAS NEGATIVE YEAST NEGATIVE 
CULT SET UP? NO 







History Of Immunizations







       Name    Date Admin    Mfg Name    Mfg Code    Trade Name    Lot#    Route    Inj    Vis Given    Vis

 Pub                                    CVX

 

        Influenza    2008    Not Entered    NE      Not Entered            Not Entered    Not Entered

                    1            999

 

        X       12/19/2008    [employer]    MSD     Pneumovax 23            Intramuscular    Not Entered

                    1            999

 

           Influenza    10/15/2010    [email or web address]    NOV        Fluvirin > 12 Years    

[id number]     Intramuscular    Left Deltoid    10/15/2010    2009    999

 

          X         3/23/2015    Wyeth-Ayerst-LederleBrijesh    [facility]       Prevnar 13    [id number]    Intramuscular

                Right Gluteous Medius    3/23/2015       2013       109







History of Past Illness







                    Name                Date of Onset       Comments

 

                    Peritoneal Neoplasm, Malignant                         

 

                    Hyperlipidemia                           

 

                    Bipolar disorder, unspecified                         

 

                    Artificial opening status; colostomy                         

 

                    B12 deficiency                           

 

                    Anemia, Pernicious                         

 

                    Arthritis unspecified                         

 

                    cervical cancer                          

 

                    Artificial opening status; colostomy    2010  1:10PM     

 

                    Bipolar disorder, unspecified    2010  1:10PM     

 

                    Hyperlipidemia      2010  1:10PM     

 

                    Anemia, Pernicious    2010  1:10PM     

 

                    Postoperative Follow-Up    2010  1:55PM     

 

                    Postoperative Follow-Up    Mar  8 2010 10:57AM     

 

                    Artificial opening status; colostomy    Mar  8 2010  1:19PM     

 

                    Peritoneal Neoplasm, Malignant    Mar  8 2010  1:19PM     

 

                    Artificial opening status; colostomy    2010  1:40PM     

 

                    Hyperlipidemia      2010  1:40PM     

 

                    Anemia, Pernicious    2010  1:40PM     

 

                    Peritoneal Neoplasm, Malignant    2010  1:40PM     

 

                    Arthritis unspecified    2010  1:40PM     

 

                    Anemia of Chronic Illness    2010  1:40PM     

 

                    Tinea corporis      2010  3:17PM     

 

                    Bipolar disorder, unspecified    2010  1:33PM     

 

                    Hyperlipidemia      2010  1:33PM     

 

                    Anemia, Pernicious    2010  1:33PM     

 

                    Peritoneal Neoplasm, Malignant    2010  1:33PM     

 

                    B12 deficiency      2010  1:33PM     

 

                    Ethmoidal Sinusitis, Acute    Sep 21 2010  3:53PM     

 

                    Wheezing            Sep 21 2010  3:53PM     

 

                    Flu                 Oct 15 2010  1:40PM     

 

                    Bipolar disorder, unspecified    Oct 15 2010  1:42PM     

 

                    Hyperlipidemia      Oct 15 2010  1:42PM     

 

                    Anemia, Pernicious    Oct 15 2010  1:42PM     

 

                    Peritoneal Neoplasm, Malignant    Oct 15 2010  1:42PM     

 

                    Bipolar disorder, unspecified    2011 12:01PM     

 

                    Hyperlipidemia      2011 12:01PM     

 

                    Anemia, Pernicious    2011 12:01PM     

 

                    Peritoneal Neoplasm, Malignant    2011 12:01PM     

 

                    Bipolar disorder, unspecified    Apr 15 2011 10:55AM     

 

                    Major Depression    2011 10:11AM     

 

                    Bipolar Disorder    2011 10:11AM     

 

                    Cancer              May 10 2011  4:16PM     

 

                    Major Depression    May 10 2011  3:16PM     

 

                    Bipolar Disorder    May 10 2011  3:16PM     

 

                    Hypercalcemia       May 23 2011  2:47PM     

 

                    Bipolar disorder, unspecified    May 23 2011  2:47PM     

 

                    Colon Cancer, Personal History    May 23 2011  2:47PM     

 

                    Bipolar Disorder    May 31 2011  4:39PM     

 

                    Depressive Disorder    2011 10:01AM     

 

                    Vitamin B12 deficiency    2011 10:01AM     

 

                    Vitamin D Deficiency    2011  5:07PM     

 

                    Anemia, Vitamin B12 Deficiency    2011  5:07PM     

 

                    B12 deficiency      2011  3:56PM     

 

                    Routine gynecological examination    Aug  4 2011  9:08AM     

 

                    Screening Examination for Breast Cancer    Aug  4 2011  9:08AM     

 

                    Tinea Corporis      Aug  4 2011  9:08AM     

 

                    Depressive Disorder    Sep 23 2011  8:47AM     

 

                    Contact Dermatitis    Sep 23 2011  8:47AM     

 

                    Anemia, Pernicious    Sep 23 2011  8:47AM     

 

                    B12 deficiency      Sep 23 2011  8:47AM     

 

                    B12 deficiency      Sep 27 2011  2:58PM     

 

                    B12 deficiency      Oct 20 2011  2:34PM     

 

                    Flu                 Dec  9 2011  3:16PM     

 

                    B12 deficiency      Dec  9 2011  3:17PM     

 

                    B12 deficiency      2012  4:52PM     

 

                    B12 deficiency      Feb 2012 11:10AM     

 

                    B12 deficiency      2012  3:37PM     

 

                    B12 deficiency      May  3 2012  4:10PM     

 

                    B12 deficiency      2012  2:54PM     

 

                    B12 deficiency      2012 11:23AM     

 

                    B12 deficiency      Aug  9 2012  2:08PM     

 

                    B12 deficiency      Sep  6 2012  4:36PM     

 

                    B12 deficiency      Oct 16 2012 10:23AM     

 

                    Flu                 Feb  4   3:11PM     

 

                    Bipolar disorder, unspecified    Feb  4   2:48PM     

 

                    Anemia, Pernicious    Feb  4   2:48PM     

 

                    B12 deficiency      Feb  4   2:48PM     

 

                    Extrapyramidal abnormal movement disorder    Feb  4   2:48PM     

 

                    B12 deficiency      Apr  3 2013 12:03PM     

 

                    Bipolar disorder, unspecified    May  7 2013  1:31PM     

 

                    Anemia, Pernicious    May  7 2013  1:31PM     

 

                    B12 deficiency      May  7 2013  1:31PM     

 

                    Extrapyramidal abnormal movement disorder    May  7 2013  1:31PM     

 

                    B12 deficiency      2013  3:42PM     

 

                    B12 deficiency      2013  1:31PM     

 

                    Hyperlipidemia      Aug  7 2013 10:37AM     

 

                    Vitamin D Deficiency    Aug  7 2013 10:37AM     

 

                    Bipolar disorder, unspecified    Aug  7 2013 10:37AM     

 

                    Anemia, Pernicious    Aug  7 2013 10:37AM     

 

                    B12 deficiency      Aug  7 2013 10:37AM     

 

                    B12 deficiency      Sep 25 2013 11:15AM     

 

                    B12 deficiency      Dec 11 2013  3:16PM     

 

                    B12 deficiency      Mar  6 2014  1:48PM     

 

                    B12 deficiency      May 21 2014  3:17PM     

 

                    Screening Examination for Breast Cancer    2014  3:23PM     

 

                    Periumbilical abdominal pain    2014  3:23PM     

 

                    B12 deficiency      Jul 10 2014  2:52PM     

 

                    Anemia, Vitamin B12 Deficiency    Aug 13 2014  4:50PM     

 

                    Bipolar disorder    Oct 16 2014 11:13AM     

 

                    Hyperlipidemia      Oct 16 2014 11:13AM     

 

                    Anemia, Pernicious    Oct 16 2014 11:13AM     

 

                    Peritoneal Neoplasm, Malignant    Oct 16 2014 11:13AM     

 

                    Screening breast examination    Oct 16 2014 11:13AM     

 

                    Weight loss         Oct 16 2014 11:13AM     

 

                    Anemia, Pernicious    Mar 23 2015  2:57PM     

 

                    B12 deficiency      Mar 23 2015  2:57PM     

 

                    Need for Prevnar vaccine    Mar 23 2015  2:57PM     

 

                    Bipolar disorder    Mar 23 2015  2:57PM     

 

                    Hyperlipidemia      Mar 23 2015  2:57PM     

 

                    Anemia, Pernicious    Mar 23 2015  2:57PM     

 

                    Peritoneal Neoplasm, Malignant    Mar 23 2015  2:57PM     

 

                    B12 deficiency      May  4 2015  4:48PM     

 

                    Hyperlipidemia      May 13 2015  9:56AM     

 

                    Anemia              May 13 2015  9:56AM     

 

                    Bipolar disorder    May 13 2015  9:56AM     

 

                    Bipolar disorder    May 14 2015  3:27PM     

 

                    Hyperlipidemia      May 14 2015  3:27PM     

 

                    Anemia, Pernicious    May 14 2015  3:27PM     

 

                    Peritoneal Neoplasm, Malignant    May 14 2015  3:27PM     

 

                    B12 deficiency      2015  2:20PM     

 

                    B12 deficiency      2015 11:34AM     

 

                    B12 deficiency      Aug 18 2015  9:06AM     

 

                    Tinea Corporis      Sep 18 2015  8:54AM     

 

                    B12 deficiency      Sep 18 2015  8:54AM     

 

                    B12 deficiency      2015 10:28AM     

 

                    Herpes zoster without complication    Dec  3 2015  9:52AM     

 

                    B12 deficiency      Dec 23 2015 11:21AM     

 

                    B12 deficiency      2016  4:51PM     

 

                    Vitamin B 12 deficiency    Mar 14 2016  5:35PM     

 

                    B12 deficiency      Mar 15 2016 12:14PM     

 

                    B12 deficiency      May  5 2016 11:30AM     

 

                    Edema               May  5 2016 11:30AM     

 

                    Dermatitis          May  5 2016 11:30AM     

 

                    Edema               May 17 2016  8:38AM     

 

                    Shortness of breath    May 17 2016  8:38AM     

 

                    Bilateral edema of lower extremity    2016  2:06PM     

 

                    B12 deficiency      2016  2:06PM     

 

                    B12 deficiency      2016 11:50AM     

 

                    B12 deficiency      2016 11:20AM     

 

                    Diarrhea            Aug  2 2016  3:13PM     

 

                    B12 deficiency      Aug 24 2016 11:10AM     

 

                    Encounter for screening mammogram for breast cancer    Aug 24 2016 11:44AM     

 

                    B12 deficiency      Sep 28 2016  2:35PM     

 

                    B12 deficiency      Dec 15 2016  2:02PM     

 

                    Dysuria             Dec 29 2016 12:14PM     

 

                    Hematuria           Fidencio  3 2017  1:33PM     

 

                    Constipation by delayed colonic transit    2017  1:52PM     

 

                    Ileus               2017  1:52PM     

 

                    UTI (urinary tract infection)    Fidencio 15 2017  3:39PM     

 

                    Acute cystitis with hematuria    2017 11:07AM     

 

                    B12 deficiency      2017 11:07AM     

 

                    B12 deficiency      2017 11:40AM     

 

                    B12 deficiency      2017  4:07PM     

 

                    Slurred speech      2017  3:07PM     

 

                    Vitamin B12 deficiency    2017  3:07PM     

 

                    Dysphagia, unspecified type    2017  3:07PM     

 

                    Hyperlipidemia      2017  3:07PM     

 

                    Dysuria             2017 12:01PM     

 

                    B12 deficiency      2017  2:08PM     

 

                    Dysuria             2017 10:58AM     

 

                    Hematuria           May 22 2017  1:36PM     

 

                    Depressive Disorder    May 22 2017  1:36PM     

 

                    Constipation        May 22 2017  1:36PM     

 

                    Fatigue             May 22 2017  1:36PM     

 

                    Urinary tract infection    May 30 2017  9:36AM     

 

                    Hypokalemia         May 30 2017 12:03PM     

 

                    Urinary tract infection    2017  4:36PM     







Payers







           Insurance Name    Company Name    Plan Name    Plan Number    Policy Number    Policy Group

 Number                                 Start Date

 

                    Medicare [facility]    [employer]ic[facility]              [id number]              N/A

 

                          [employer]    [employer]                 [id number]

                                                    

 

                    Medicare Part A    Medicare - Lab/Xray              [id number]              2006

 

                    Medicare Part B    [employer] [place]              [id number]              2006

 

                          [employer]    [employer]                 50 percent

                                                    2009

 

                    [facility]    [employer]              [id number]              N/A

 

                    Medicare Part A    Medicare Part A              [id number]              N/A

 

                    Medicare Part A    Medicare Part A              [id number]              2006









History of Encounters







                    Visit Date          Visit Type          Provider

 

                    2017           [facility]            Pranav Agnel MD

 

                    2017           Office visit        Bhupinder Aspen DO

 

                    2017           Nurse visit         Bhupinder Aspen DO

 

                    2017           Office visit         

 

                    2017           Office visit        Bhupinder Aspen DO

 

                    2017           Nurse visit         Bhupinder Aspen DO

 

                    2017           Office visit        Radha Ontiveros APRN

 

                    1/15/2017           Office visit        Aj Tapia NP

 

                    2017            Office visit        Devin Masterson MD

 

                    2016          [facility]            Devin Masterson MD

 

                    12/15/2016          Nurse visit         Bhupinder Aspen DO

 

                    2016           Nurse visit         Bret Forte APRN

 

                    2016           Nurse visit         Bhupinder Aspen DO

 

                    2016            Office visit        Bhupinder Aspen DO

 

                    2016           Nurse visit         Bhupinder Aspen DO

 

                    2016           Office visit        Bret Forte APRN

 

                    2016            Office visit        Bhupinder Aspen DO

 

                    3/15/2016           Nurse visit         Bhupinder Aspen DO

 

                    2016            Nurse visit         Bhupinder Aspen DO

 

                    2015          Nurse visit         Bhupinder Aspen DO

 

                    12/3/2015           Office visit        Bhupinder Aspen DO

 

                    2015          Nurse visit         Bhupinder Aspen DO

 

                    2015           Office visit        Bhupinder Aspen DO

 

                    2015           Nurse visit         Bhupinder Aspen DO

 

                    2015            Nurse visit         Bhupinder Aspen DO

 

                    2015            Nurse visit         Bhupinder Aspen DO

 

                    2015           Office visit        Bhupinder Aspen DO

 

                    2015            Nurse visit         Bhupinder Aspen DO

 

                    3/23/2015           Office visit        Bhupinder Aspen DO

 

                    10/16/2014          Office visit        Bhupinder Aspen DO

 

                    2014           Nurse visit         Radha Ontiveros APRN

 

                    7/10/2014           Nurse visit         Bhupinder Aspen DO

 

                    2014           Office visit        Bhupinder Aspen DO

 

                    2014           Nurse visit         Bhupinder Aspen DO

 

                    3/6/2014            Nurse visit         Bhupinder Aspen DO

 

                    2014            [facility]            EARNEST Lopez MD

 

                    2013          Nurse visit         Bhupinder Aspen DO

 

                    2013           Nurse visit         Bhupinder Aspen DO

 

                    2013            Office visit        Bhupinder Aspen DO

 

                    2013            Nurse visit         Bhupinder Aspen DO

 

                    2013            Nurse visit         Bhupinder Aspen DO

 

                    2013            Office visit        Bhupinder Aspen DO

 

                    4/3/2013            Nurse visit         Bhupinder Aspen DO

 

                    2013            Office visit        Bhupinder Aspen DO

 

                    10/16/2012          Nurse visit         Bhupinder Aspen DO

 

                    2012            Nurse visit         Bhupinder Aspen DO

 

                    2012            Voided              Bhupinder Aspen DO

 

                    2012            Nurse visit         Bhupinder Aspen DO

 

                    2012            Nurse visit         Bhupinder Aspen DO

 

                    2012           Nurse visit         Bhupinder Aspen DO

 

                    5/3/2012            Nurse visit         Bhupinder Aspen DO

 

                    2012           Nurse visit         Bhupinder Aspen DO

 

                    2012           Nurse visit         Bhupinder Aspen DO

 

                    2012           Nurse visit         Bhupinder Aspen DO

 

                    2011           Nurse visit         Bhupinder Aspen DO

 

                    10/20/2011          Nurse visit         Bhupinder Aspen DO

 

                    2011           Office visit        Bhupinder Aspen DO

 

                    2011           Nurse visit         Radha GREEN

 

                    2011            Office visit        Bhupinder Aspen DO

 

                    2011           Nurse visit         Bhupinder Aspen DO

 

                    2011            Office visit        Bhupinder Aspen DO

 

                    2011           Office visit        Bhupinder Aspen DO

 

                    5/10/2011           Office visit        Bhupinder Aspen DO

 

                    2011           Office visit        Bhupinder Aspen DO

 

                    4/15/2011           Office visit        Devin Agnel DO

 

                    2011           Office visit        Devin Angel DO

 

                    10/15/2010          Office visit        Devin Angel DO

 

                    2010           Office visit        Devin Angel DO

 

                    2010            Office visit        Devin Angel DO

 

                    2010           Office visit        Devin Angel DO

 

                    2010            Office visit        Devin Angel DO

 

                    3/8/2010            Office visit        Devin Masterson MD

 

                    2010            Surgery             Devin Masterson MD

 

                    2010            Office visit        Devin Angel DO

 

                    2010           Surgery             Devin Masterson MD

 

                    2010           Hospital            Devin Masterson MD

 

                    2010           [facility]            Devin Masterson MD

 

                    10/22/2009          Office visit        Devin Angel DO

## 2019-06-26 NOTE — XMS REPORT
MU2 Ambulatory Summary

                             Created on: 2017



Pauline Gan

External Reference #: [id number]

: 1950

Sex: Female



Demographics







                          Address                   [address]

[place]  [id number]

 

                          Home Phone                [phone]

 

                          Preferred Language        English

 

                          Marital Status            Legally 

 

                          Shinto Affiliation     Unknown

 

                          Race                      White

 

                          Ethnic Group              Not  or 





Author







                          Author                    Bhupinder Louise

 

                          [facility]

 

                          Address                   [address]

[place]  [id number]



 

                          Phone                     [phone]







Care Team Providers







                    Care Team Member Name    Role                Phone

 

                    Bhupinder Louise    PCP                 Unavailable

 

                    Bhupinder Louise    PreferredProvider    Unavailable







Allergies and Adverse Reactions







                    Name                Reaction            Notes

 

                    NO KNOWN DRUG ALLERGIES                         







Plan of Treatment







             Planned Activity    Comments     Planned Date    Planned Time    Plan/Goal

 

             Injection,Subcutaneous/Intramuscul, [facility]                 2017    12:00 AM      

 

             URINALYSIS ROUTINE C&S IF IND                 2017     12:00 AM      







Medications







                                        Active 

 

             Name         Start Date    Estimated Completion Date    SIG          Comments

 

                Latuda 20 mg oral tablet                                    take 1 tablet (20 mg) by oral route once daily with

 food (at least 350 calories)            

 

             pravastatin 40 mg oral tablet    3/30/2015                 TAKE 1 TABLET BY MOUTH DAILY     

 

                Namenda XR 28 mg oral capsule,sprinkle,ER 24hr    2015                       take 1 capsule (28

 mg) by oral route once daily            

 

                Namenda XR 28 mg oral capsule,sprinkle,ER 24hr    2016                       take 1 capsule (28

 mg) by oral route once daily            

 

                potassium chloride 10 mEq oral tablet extended release    2016                       take 1 tablet

 (10 meq) by oral route once daily       

 

             pravastatin 40 mg oral tablet    2016                 TAKE 1 TABLET BY MOUTH DAILY     

 

                Vitamin B-12 1,000 mcg/mL injection solution    2016                       inject 1 milliliter 

(1,000 mcg) by intramuscular route once a month     

 

                potassium chloride 10 mEq oral tablet extended release    2016                      take 1 tablet

 (10 meq) by oral route once daily       

 

                Namenda XR 28 mg oral capsule,sprinkle,ER 24hr    2016                      TAKE 1 CAPSULE BY

 MOUTH EVERY DAY                         

 

                furosemide 40 mg oral tablet    2016                      take 1 tablet (40 mg) by oral route

 once daily                              

 

                mirtazapine 45 mg oral tablet                                    take 1 tablet (45 mg) by oral route once daily

 before bedtime                          

 

             Fish Oil 300-1,000 mg oral capsule                              take 1 capsule by oral route daily     

 

             Vitamin D3 1,000 unit oral tablet                              take 1 tablet by oral route daily     

 

                Calcium 600 600 mg calcium (1,500 mg) oral tablet                                    take 1 tablet by oral route

 daily                                   

 

                Aspirin Low Dose 81 mg oral tablet,delayed release (DR/EC)                                    take 1 tablet 

(81 mg) by oral route once daily         

 

                metoclopramide HCl 10 mg oral tablet    2017                       take 1 tablet by oral route 

2 times a day for 50 days                

 

             furosemide 40 mg oral tablet    2017                 TAKE 1 TABLET BY MOUTH DAILY     

 

                Namenda XR 28 mg oral capsule,sprinkle,ER 24hr    2017                       TAKE 1 CAPSULE BY 

MOUTH EVERY DAY                          

 

                Linzess 72 mcg oral capsule    2017                       take 1 capsule (72 mcg) by oral route

 once daily on an empty stomach at least 30 minutes before 1st meal of the day     



 

             pravastatin 40 mg oral tablet    2017                 TAKE 1 TABLET BY MOUTH DAILY     

 

                potassium chloride 10 mEq oral tablet extended release    2017                       TAKE 2 TABLETs

 BY MOUTH twice DAILY for one week       

 

                cefuroxime axetil 500 mg oral tablet    2017        take 1 tablet (500 mg)

 by oral route 2 times per day for 10 days     

 

                metoclopramide HCl 10 mg oral tablet    2017                       TAKE 1 TABLET BY ORAL ROUTE 

2 TIMES A DAY FOR 50 DAYS                









                                         

 

             Name         Start Date    Expiration Date    SIG          Comments

 

             Reglan 10mg    3/29/2010    2010    one ac and hs     

 

                Keflex 500 mg oral capsule    2010       10/1/2010       take 1 capsule (500 mg) by oral

 route every 6 hours for 10 days         

 

                Bactrim -160 mg oral tablet    2011       take 1 tablet by oral route

 every 12 hours for 7 days               

 

                triamcinolone acetonide 0.1 % topical cream    2011      apply a thin

 layer to the affected area(s) by topical route 2 times per day     

 

                sertraline 100 mg oral tablet    4/10/2012       5/10/2012       take 1.5 tablets by oral route

 daily for 30 days                       

 

                ergocalciferol (vitamin D2) 50,000 unit oral capsule    4/15/2013       2013       TAKE

 ONE CAPSULE BY MOUTH ONCE A WEEK        

 

                CYANOCOBALAM 1000MCGINJ 1000 milliliter    2013       INJECT 1ML INTRAMUSCULAR

 ONCE A MONTH                            

 

                pravastatin 40 mg oral tablet    3/25/2014       3/20/2015       TAKE ONE TABLET BY MOUTH EVERY

 DAY                                     

 

                          Zostavax (PF) 19,400 unit/0.65 mL subcutaneous suspension for reconstitution    3/23/2015

                    3/24/2015           inject 0.65 milliliter by subcutaneous route once     

 

                famciclovir 500 mg oral tablet    12/3/2015       12/10/2015      take 1 tablet (500 mg) by

 oral route every 8 hours for 7 days     

 

                furosemide 40 mg oral tablet    2016      take 1 tablet (40 mg) by oral

 route once daily                        

 

                Cipro 500 mg oral tablet    2016       take 1 tablet (500 mg) by oral route

 2 times per day for 5 days              

 

                Bactrim -160 mg oral tablet    2016        take 1 tablet by oral route

 every 12 hours for 7 days               

 

                metoclopramide HCl 10 mg oral tablet    2017       take 1 tablet by oral

 route 2 times a day for 50 days         

 

                Macrobid 100 mg oral capsule    2017       take 1 capsule (100 mg) by oral

 route 2 times per day with food for 7 days     

 

                Augmentin 875-125 mg oral tablet    2017       take 1 tablet by oral route

 every 12 hours for 7 days               

 

                amoxicillin 500 mg oral tablet    2017       take 1 tablet (500 mg) by oral

 route every 12 hours for 7 days         

 

                ciprofloxacin HCl 500 mg oral tablet    2017       take 1 tablet (500 mg)

 by oral route every 12 hours for 5 days     









                                        Discontinued 

 

             Name         Start Date    Discontinued Date    SIG          Comments

 

                Tylenol 325 mg oral tablet                    2013        take 1 - 2 tablets (325 -650 mg) by oral

 route every 4-6 hours as needed         

 

                Calcium 600 + D(3) 600 mg(1,500mg) -400 unit oral tablet                    2011       take 1 tablet

 by oral route 2 times a day            no longer taking

 

                Vitamin B-12 1,000 mcg oral tablet extended release    2010       take 1

 tablet by oral route daily             no longer taking

 

                Antifungal (clotrimazole) 1 % topical cream    2010       apply to the 

affected and surrounding areas of skin by topical route 2 times per day morning 
and evening                              

 

                sertraline 100 mg oral tablet    5/10/2011       2011       take 2 tablets (200 mg) by 

oral route once daily                   discontinued by Dr. Serrano

 

                mirtazapine 15 mg oral tablet                    2011        take 1 tablet (15 mg) by oral route 

once daily before bedtime               Dr. Serrano

 

                mirtazapine 15 mg oral tablet                    2011        take 1 tablet (15 mg) by oral route 

once daily before bedtime               dc'd by Dr. Serrano

 

                Pristiq 50 mg oral tablet extended release 24 hr                    2013        take 1 tablet (50

 mg) by oral route once daily           Dr. Serrano

 

                Pristiq 50 mg oral tablet extended release 24 hr                    2013        take 1 tablet (50

 mg) by oral route once daily           dose updated

 

                Vitamin B-12 1,000 mcg/mL injection solution    2011        inject 1 milliliter

 (1,000 mcg) by intramuscular route once a month    on list already

 

                    syringe with needle 1 mL 25 gauge x 1" miscellaneous syringe    2011

                          use for injection once a month     

 

                clotrimazole 1 % topical cream    2011        apply to the affected and surrounding

 areas of skin by topical route 2 times per day in the morning and evening     

 

                Vitamin D2 50,000 unit oral capsule    2011        take 1 capsule (50,000

 unit) by oral route once weekly        generic on list

 

                Pravachol 40 mg oral tablet    2012        take 1 tablet (40 mg) by oral 

route once daily for 90 days            generic on list

 

                lithium carbonate 300 mg oral capsule    2012        take 1 capsule by oral

 route daily                            dose updated

 

                Pristiq 100 mg oral tablet extended release 24 hr                    4/10/2012       take 1 and 1/2 

tablet (150 mg) by oral route once daily    Mental Health provider

 

                Pristiq 100 mg oral tablet extended release 24 hr                    4/10/2012       take 1 and 1/2 

tablet (150 mg) by oral route once daily    Discontinued by Dr Efrain Knight at [facility]

 

                hydroxyzine HCl 50 mg oral tablet    10/16/2014      2015       take 1 tablet (50 mg) 

by oral route at bedtime                 

 

                lithium carbonate 300 mg oral capsule    2015       take 1 capsule (300

 mg) by oral route 2 for 30 days         

 

                fluconazole 100 mg oral tablet    2015       12/3/2015       take 1 tablet (100 mg) by 

oral route once a week                   

 

                ketoconazole 2 % topical cream    2015       12/3/2015       apply to the affected area(s)

 by topical route 2 times per day        

 

                prednisone 10 mg oral tablet    12/3/2015       2016        take 2 tablets (20 mg) by oral

 route once daily for 4 days 1 tablet daily for 4 days 0.5 tablet daily for 4 
days                                     

 

                triamcinolone acetonide 0.1 % topical cream    2016       apply a thin layer

 to the affected area(s) by topical route 2 times per day     

 

                Cipro 500 mg oral tablet    1/15/2017       2017       take 1 tablet (500 mg) by oral route

 every 12 hours for 10 days              







Problem List







                    Description         Status              Onset

 

                    Artificial opening status; colostomy    Active               

 

                    Bipolar disorder, unspecified    Active               

 

                    Hyperlipidemia      Active               

 

                    Peritoneal Neoplasm, Malignant    Active               

 

                    Anemia, Pernicious    Active               

 

                    Arthritis unspecified    Active               

 

                    B12 deficiency      Active               







Vital Signs







      Date    Time    BP-Sys(mm[Hg]    BP-Lynn(mm[Hg])    HR(bpm)    RR(rpm)    Temp    WT    HT    HC    BMI

                    BSA                 BMI Percentile      O2 Sat(%)

 

        2017    1:34:00 PM    118 mmHg    62 mmHg    122 bpm    18 rpm    97.8 F    161.375 lbs    

69 in                     23.83 kg/m2    1.89 m2                   96 %

 

        2017    3:05:00 PM    134 mmHg    70 mmHg    70 bpm    20 rpm    97.4 F    181 lbs    69 in

                          26.7288 kg/m    1.9992 m                 98 %

 

        2017    11:07:00 AM    124 mmHg    64 mmHg    62 bpm    17 rpm    98.2 F    181.2 lbs    69

 in                       26.76 kg/m2    2.00 m2                   98 %

 

        1/15/2017    3:34:00 PM    148 mmHg    89 mmHg    69 bpm    20 rpm    98.2 F    179 lbs    69 in

                          26.4334 kg/m    1.9882 m                 98 %

 

       2017    1:51:00 PM    160 mmHg    90 mmHg    100 bpm    20 rpm    96.5 F    179 lbs             

                                                                98 %

 

       2016    3:11:00 PM    134 mmHg    76 mmHg    80 bpm    20 rpm    98 F    163 lbs    69 in     

                24.0706 kg/m    1.8972 m                      98 %

 

        2016    2:04:00 PM    142 mmHg    86 mmHg    68 bpm    16 rpm    98.5 F    166 lbs    63 in

                          29.41 kg/m2    1.83 m2                   100 %

 

        2016    11:27:00 AM    148 mmHg    78 mmHg    90 bpm    20 rpm    98.2 F    153 lbs    69 in

                          22.5939 kg/m    1.8381 m                 96 %

 

        12/3/2015    9:50:00 AM    132 mmHg    70 mmHg    62 bpm    16 rpm    97.9 F    145 lbs    69 in

                          21.41 kg/m2    1.79 m2                   100 %

 

        2015    8:52:00 AM    132 mmHg    68 mmHg    52 bpm    20 rpm    97.8 F    141 lbs    69 in

                          20.8218 kg/m    1.7645 m                 100 %

 

        2015    3:25:00 PM    120 mmHg    62 mmHg    72 bpm    16 rpm    98.1 F    136 lbs    69 in

                          20.08 kg/m2    1.73 m2                   98 %

 

       3/23/2015    2:55:00 PM    130 mmHg    76 mmHg    68 bpm    18 rpm    97 F    140 lbs    69 in    

                20.6742 kg/m    1.7583 m                      98 %

 

        10/16/2014    11:11:00 AM    120 mmHg    66 mmHg    77 bpm    20 rpm    98 F    130 lbs    69 in

                          19.20 kg/m2    1.69 m2                   100 %

 

        2014    3:21:00 PM    130 mmHg    66 mmHg    63 bpm    18 rpm    97.2 F    160 lbs    69 in

                          23.6276 kg/m    1.8797 m                 99 %

 

        2013    10:35:00 AM    132 mmHg    70 mmHg    66 bpm    20 rpm    98.1 F    157 lbs    69 in

                          23.18 kg/m2    1.86 m2                    

 

        2013    1:29:00 PM    132 mmHg    70 mmHg    76 bpm    18 rpm    98.2 F    166 lbs    69 in 

                          24.5137 kg/m    1.9146 m                  

 

       2013    2:46:00 PM    128 mmHg    70 mmHg    76 bpm    16 rpm    98 F    160 lbs    69 in     

                23.63 kg/m2     1.88 m2                          

 

        2011    8:49:00 AM    128 mmHg    78 mmHg    70 bpm    18 rpm    97.9 F    164 lbs    69 in

                          24.2183 kg/m    1.903 m                  

 

     2011    1:31:00 PM    132 mmHg    68 mmHg    84 bpm         97 F    167 lbs                        

                                         

 

        2011    9:09:00 AM    128 mmHg    70 mmHg    72 bpm    18 rpm    98.2 F    163 lbs    64 in 

                          27.9786 kg/m    1.8272 m                  

 

       2011    10:01:00 AM    132 mmHg    70 mmHg    72 bpm    18 rpm    98.2 F    154 lbs             

                                                                 

 

       2011    2:47:00 PM    128 mmHg    70 mmHg    72 bpm    18 rpm    97.8 F    156 lbs             

                                                                 

 

       5/10/2011    3:16:00 PM    144 mmHg    80 mmHg    72 bpm    18 rpm    98.2 F    158 lbs             

                                                                 

 

        2011    10:11:00 AM    132 mmHg    70 mmHg    70 bpm    18 rpm    98.2 F    168 lbs    69 in

                          24.809 kg/m    1.9261 m                  

 

        4/15/2011    10:52:00 AM    110 mmHg    60 mmHg    75 bpm    16 rpm    97.5 F    172.375 lbs    

69 in                     25.46 kg/m2    1.95 m2                   100 %

 

        2011    11:43:00 AM    120 mmHg    82 mmHg    75 bpm    16 rpm    97.2 F    178.5 lbs    69

 in                       26.3596 kg/m    1.9854 m                 100 %

 

        10/15/2010    1:32:00 PM    120 mmHg    70 mmHg    80 bpm    18 rpm    96.6 F    177 lbs    69 in

                          26.14 kg/m2    1.98 m2                   100 %

 

        2010    3:50:00 PM    168 mmHg    100 mmHg    82 bpm    18 rpm    97.8 F    177.5 lbs    69

 in                       26.2119 kg/m    1.9798 m                 97 %

 

        2010    1:21:00 PM    140 mmHg    80 mmHg    59 bpm    16 rpm    97.6 F    173.25 lbs    69 

in                        25.58 kg/m2    1.96 m2                   100 %

 

        2010    3:02:00 PM    140 mmHg    80 mmHg    61 bpm    16 rpm    97.6 F    173.125 lbs    69

 in                       25.5658 kg/m    1.9553 m                 99 %

 

        2010    1:23:00 PM    130 mmHg    80 mmHg    66 bpm    16 rpm    96.8 F    173 lbs    69 in 

                          25.55 kg/m2    1.95 m2                   100 %

 

        2010    12:58:00 PM    130 mmHg    88 mmHg    75 bpm    16 rpm    98.4 F    172.25 lbs    69

 in                       25.4366 kg/m    1.9503 m                 100 %







Social History







                    Name                Description         Comments

 

                    denies alcohol use                         

 

                    denies smoking                           

 

                    Denies illicit substance abuse                         

 

                    retired                                 direct care

 

                    Single                                   

 

                    Exercises regularly                         

 

                    Attended some college                         

 

                    Tobacco             Never smoker         







History of Procedures







                    Date Ordered        Description         Order Status

 

                    2010 12:00 AM    COMPREHEN METABOLIC PANEL    Reviewed

 

                    2010 12:00 AM    COMPLETE CBC W/AUTO DIFF WBC    Reviewed

 

                    2010 12:00 AM    LIPID PANEL         Reviewed

 

                          2015 12:00 AM        B12 Injection, Up to 1000 Mcg NDC#9720-7803-38 [facility] 

                                        Reviewed

 

                    2011 12:00 AM    MAMMOGRAM SCREENING    Reviewed

 

                    2011 12:00 AM    CYTOPATH C/V THIN LAYER    Reviewed

 

                    2011 12:00 AM    B12 Injection 1 cc NDC#[address]-06    Reviewed

 

                    2015 12:00 AM    THER/PROPH/DIAG INJ SC/IM    Reviewed

 

                    2015 12:00 AM    B12 Injection, Up to 1000 Mcg NDC#7933-3175-72    Reviewed

 

                    2011 12:00 AM    THER/PROPH/DIAG INJ SC/IM    Reviewed

 

                    2011 12:00 AM    B12 Injection([place]) Ndc#2213-1555-93-    Reviewed

 

                    2015 12:00 AM    THER/PROPH/DIAG INJ SC/IM    Reviewed

 

                    2015 12:00 AM    B12 Injection, Up to 1000 Mcg NDC#3895-9958-78    Reviewed

 

                    10/20/2011 12:00 AM    THER/PROPH/DIAG INJ SC/IM    Reviewed

 

                    10/20/2011 12:00 AM    B12 Injection([place]) Ndc#6453-0994-24-    Reviewed

 

                    2016 12:00 AM    THER/PROPH/DIAG INJ SC/IM    Reviewed

 

                    2016 12:00 AM    B12 Injection, Up to 1000 Mcg NDC#4977-8083-66    Reviewed

 

                    3/14/2016 12:00 AM    VITAMIN B-12        Reviewed

 

                    3/15/2016 12:00 AM    THER/PROPH/DIAG INJ SC/IM    Reviewed

 

                    3/15/2016 12:00 AM    B12 Injection, Up to 1000 Mcg NDC#8249-1562-87    Reviewed

 

                    2011 12:00 AM    ***Immunization administration, Medicare flu    Reviewed

 

                    2011 12:00 AM    Fluzone ** MEDICARE Only **    Reviewed

 

                    2011 12:00 AM    THER/PROPH/DIAG INJ SC/IM    Reviewed

 

                    2011 12:00 AM    B12 Injection ([employer] [facility]) Ndc#4920-4059-78    Reviewed

 

                    2016 12:00 AM    B12 Injection, Up to 1000 Mcg NDC#9084-1681-52 [facility]    

Reviewed

 

                    2016 12:00 AM    TTE W/DOPPLER COMPLETE    Reviewed

 

                    2016 12:00 AM    EXTREMITY STUDY     Reviewed

 

                          2016 12:00 AM        B12 Injection, Up to 1000 Mcg NDC#8577-2514-45 [facility] 

                                        Reviewed

 

                    2016 12:00 AM    THER/PROPH/DIAG INJ SC/IM    Reviewed

 

                    2016 12:00 AM    B12 Injection, Up to 1000 Mcg NDC#9464-1354-73    Reviewed

 

                    2016 12:00 AM    THER/PROPH/DIAG INJ SC/IM    Reviewed

 

                    2012 12:00 AM    B12 Injection([place]) Ndc#4371-7880-36-    Reviewed

 

                    2016 12:00 AM    B12 Injection, Up to 1000 Mcg NDC#4253-0951-16    Reviewed

 

                    2016 12:00 AM    THER/PROPH/DIAG INJ SC/IM    Reviewed

 

                    2012 12:00 AM    THER/PROPH/DIAG INJ SC/IM    Reviewed

 

                    2012 12:00 AM    B12 Injection ([employer]) Ndc#0973-9983-38    Reviewed

 

                    2016 12:00 AM    THER/PROPH/DIAG INJ SC/IM    Reviewed

 

                    2016 12:00 AM    B12 Injection, Up to 1000 Mcg NDC#0522-4216-97    Reviewed

 

                    2016 12:00 AM    B12 Injection, Up to 1000 Mcg NDC#1503-4843-85    Reviewed

 

                    2016 12:00 AM    THER/PROPH/DIAG INJ SC/IM    Reviewed

 

                    2012 12:00 AM    THER/PROPH/DIAG INJ SC/IM    Reviewed

 

                    2012 12:00 AM    B12 Injection([place]) Ndc#7992-3787-88-    Reviewed

 

                    12/15/2016 12:00 AM    B12 Injection, Up to 1000 Mcg NDC#5190-7300-62    Reviewed

 

                    12/15/2016 12:00 AM    THER/PROPH/DIAG INJ SC/IM    Reviewed

 

                    2016 12:00 AM    URNLS DIP STICK/TABLET RGNT AUTO W/O MICROSCOPY    Reviewed

 

                    1/3/2017 12:00 AM    URNLS DIP STICK/TABLET RGNT AUTO W/O MICROSCOPY    Reviewed

 

                    2017 12:00 AM    URINE CULTURE/COLONY COUNT    Reviewed

 

                    2017 12:00 AM    Rocephin 1 gram Injection, [facility]    Reviewed

 

                    2017 12:00 AM    THERAPEUTIC PROPHYLACTIC/DX INJECTION SUBQ/IM    Reviewed

 

                    2017 12:00 AM    B12 1000mcg Injection, [facility] [employer]    Reviewed

 

                    5/3/2012 12:00 AM    THER/PROPH/DIAG INJ SC/IM    Reviewed

 

                    5/3/2012 12:00 AM    B12 Injection([facility]) Ndc#7106-2189-46-    Reviewed

 

                    2017 12:00 AM    THERAPEUTIC PROPHYLACTIC/DX INJECTION SUBQ/IM    Reviewed

 

                    2017 12:00 AM    B12 1000mcg Injection, [facility]    Reviewed

 

                    2017 12:00 AM    MRI BRAIN STEM W/O & W/DYE    Reviewed

 

                    2017 12:00 AM    VITAMIN B-12        Reviewed

 

                    2017 12:00 AM    Speech Therapy Consult    Reviewed

 

                    2017 12:00 AM    ASSAY OF CREATININE    Reviewed

 

                    2012 12:00 AM    IMMUNOTHERAPY INJECTIONS    Reviewed

 

                    2012 12:00 AM    B12 Injection([facility]) Ndc#8246-7266-93-    Reviewed

 

                    2017 12:00 AM    URINALYSIS AUTO W/SCOPE    Reviewed

 

                    2012 12:00 AM    THER/PROPH/DIAG INJ SC/IM    Reviewed

 

                    2012 12:00 AM    B12 Injection, Up to 1000 Mcg NDC#7139-0054-43    Reviewed

 

                    2017 12:00 AM    URINALYSIS AUTO W/SCOPE    Reviewed

 

                    2017 2:18 PM    URINALYSIS AUTO W/O SCOPE    Reviewed

 

                    2017 12:00 AM    URINE CULTURE/COLONY COUNT    Reviewed

 

                    2017 12:00 AM    B12 1000mcg Injection    Reviewed

 

                    2017 12:00 AM    URNLS DIP STICK/TABLET RGNT AUTO W/O MICROSCOPY    Reviewed

 

                    2017 12:00 AM    METABOLIC PANEL TOTAL CA    Reviewed

 

                    2012 12:00 AM    THER/PROPH/DIAG INJ SC/IM    Reviewed

 

                    2012 12:00 AM    B12 Injection, Up to 1000 Mcg NDC#3808-8818-44    Reviewed

 

                    2012 12:00 AM    THER/PROPH/DIAG INJ SC/IM    Reviewed

 

                    2012 12:00 AM    B12 Injection, Up to 1000 Mcg NDC#6565-4353-98    Reviewed

 

                    10/16/2012 12:00 AM    THER/PROPH/DIAG INJ SC/IM    Reviewed

 

                    10/16/2012 12:00 AM    B12 Injection, Up to 1000 Mcg NDC#5144-8060-49    Reviewed

 

                    2010 12:00 AM    COMPREHEN METABOLIC PANEL    Reviewed

 

                    2010 12:00 AM    COMPLETE CBC W/AUTO DIFF WBC    Reviewed

 

                    2010 12:00 AM    LIPID PANEL         Reviewed

 

                    2013 12:00 AM    Flu Injection 3 Years And Above NDC# [address]-05  [facility]    Reviewed



 

                    2013 12:00 AM    COMPLETE CBC W/AUTO DIFF WBC    Reviewed

 

                    2013 12:00 AM    ASSAY OF LITHIUM    Reviewed

 

                    2013 12:00 AM    METABOLIC PANEL TOTAL CA    Reviewed

 

                    4/3/2013 12:00 AM    THER/PROPH/DIAG INJ SC/IM    Reviewed

 

                    4/3/2013 12:00 AM    B12 Injection, Up to 1000 Mcg NDC#7582-2630-16    Reviewed

 

                    2013 12:00 AM    THER/PROPH/DIAG INJ SC/IM    Reviewed

 

                    2013 12:00 AM    B12 Injection, Up to 1000 Mcg NDC#1389-3338-38    Reviewed

 

                    2013 12:00 AM    THER/PROPH/DIAG INJ SC/IM    Reviewed

 

                    2013 12:00 AM    B12 Injection, Up to 1000 Mcg NDC#5644-5378-98    Reviewed

 

                    2013 12:00 AM    LIPID PANEL         Reviewed

 

                    2013 12:00 AM    VITAMIN D 25 HYDROXY    Reviewed

 

                    2013 12:00 AM    THER/PROPH/DIAG INJ SC/IM    Reviewed

 

                    2013 12:00 AM    B12 Injection, Up to 1000 Mcg NDC#9679-7984-33    Reviewed

 

                    2013 12:00 AM    THER/PROPH/DIAG INJ SC/IM    Reviewed

 

                    3/6/2014 12:00 AM    THER/PROPH/DIAG INJ SC/IM    Reviewed

 

                    2014 12:00 AM    THER/PROPH/DIAG INJ SC/IM    Reviewed

 

                    2014 12:00 AM    B12 Injection, Up to 1000 Mcg NDC#3839-8469-37    Reviewed

 

                    2010 12:00 AM    SKIN FUNGI CULTURE    Reviewed

 

                    10/9/2010 12:00 AM    COMPREHEN METABOLIC PANEL    Reviewed

 

                    10/9/2010 12:00 AM    LIPID PANEL         Reviewed

 

                    2010 12:00 AM    THER/PROPH/DIAG INJ SC/IM    Reviewed

 

                    2010 12:00 AM    B12 Injection Ndc#[address]-46 ([facility])    Reviewed

 

                    2010 12:00 AM    THER/PROPH/DIAG INJ SC/IM    Reviewed

 

                    2010 12:00 AM    Kenalog 40 Mg Im-Ndc#[address]-87 ([facility])    Reviewed

 

                    10/15/2010 12:00 AM    FLU VACCINE 3 YRS & > IM    Reviewed

 

                    10/15/2010 12:00 AM    Admin.Of M/C Cov.Vaccine-Flu Vacc.    Reviewed

 

                    1/15/2011 12:00 AM    COMPLETE CBC W/AUTO DIFF WBC    Reviewed

 

                    1/15/2011 12:00 AM    COMPREHEN METABOLIC PANEL    Reviewed

 

                    1/15/2011 12:00 AM    LIPID PANEL         Reviewed

 

                    2014 12:00 AM    MAMMOGRAM SCREENING    Reviewed

 

                    2014 12:00 AM    Screening mammography, bilateral    Reviewed

 

                    7/10/2014 12:00 AM    THER/PROPH/DIAG INJ SC/IM    Reviewed

 

                    7/10/2014 12:00 AM    B12 Injection, Up to 1000 Mcg NDC#6195-8725-12    Reviewed

 

                    2011 12:00 AM    COMPLETE CBC W/AUTO DIFF WBC    Reviewed

 

                    2011 12:00 AM    COMPREHEN METABOLIC PANEL    Reviewed

 

                    2011 12:00 AM    LIPID PANEL         Reviewed

 

                    2014 12:00 AM    B12 Injection, Up to 1000 Mcg NDC#4185-1019-67    Reviewed

 

                    10/19/2014 12:00 AM    MAMMOGRAM SCREENING    Reviewed

 

                    10/19/2014 12:00 AM    Screening mammography, bilateral    Reviewed

 

                    10/16/2014 12:00 AM    B12 Injection, Up to 1000 Mcg NDC#8157-6717-64    Reviewed

 

                    10/16/2014 12:00 AM    COMPLETE CBC W/AUTO DIFF WBC    Reviewed

 

                    10/16/2014 12:00 AM    COMPREHEN METABOLIC PANEL    Reviewed

 

                    10/16/2014 12:00 AM    IMMUNOASSAY TUMOR     Reviewed

 

                    10/16/2014 12:00 AM    LIPID PANEL         Reviewed

 

                    10/16/2014 12:00 AM    ASSAY OF LITHIUM    Reviewed

 

                    10/16/2014 12:00 AM    MAMMOGRAM SCREENING    Reviewed

 

                    2011 12:00 AM    ASSAY OF PARATHORMONE    Reviewed

 

                    2011 12:00 AM    VITAMIN D 25 HYDROXY    Reviewed

 

                    2011 12:00 AM    ASSAY OF LITHIUM    Reviewed

 

                    2011 12:00 AM    METABOLIC PANEL TOTAL CA    Reviewed

 

                    2011 12:00 AM    CT HEAD/BRAIN W/O & W/DYE    Reviewed

 

                    3/23/2015 12:00 AM    PNEUMOCOCCAL VACC 13 GLENDY IM    Reviewed

 

                    3/23/2015 12:00 AM    Vitamin B12 injection    Reviewed

 

                    2011 12:00 AM    ASSAY OF LITHIUM    Reviewed

 

                    2011 12:00 AM    B12 Injection Ndc#[address]-94  [place]    Reviewed

 

                    2015 12:00 AM    THER/PROPH/DIAG INJ SC/IM    Reviewed

 

                    2015 12:00 AM    B12 Injection, Up to 1000 Mcg NDC#8018-7123-81    Reviewed

 

                    2015 12:00 AM    COMPLETE CBC W/AUTO DIFF WBC    Reviewed

 

                    2015 12:00 AM    COMPREHEN METABOLIC PANEL    Reviewed

 

                    2015 12:00 AM    LIPID PANEL         Reviewed

 

                    2015 12:00 AM    ASSAY OF LITHIUM    Reviewed

 

                    2011 12:00 AM    VIT D 1 25-DIHYDROXY    Reviewed

 

                    2011 12:00 AM    VITAMIN B-12        Reviewed

 

                    2015 12:00 AM    B12 Injection, Up to 1000 Mcg NDC#9243-2455-40    Reviewed

 

                    2015 12:00 AM    THER/PROPH/DIAG INJ SC/IM    Reviewed

 

                    2015 12:00 AM    B12 Injection, Up to 1000 Mcg NDC#3626-6408-72    Reviewed

 

                    2011 12:00 AM    THER/PROPH/DIAG INJ SC/IM    Reviewed

 

                    2011 12:00 AM    B12 Injection ([facility]) Ndc#8022-2198-36    Reviewed

 

                    2015 12:00 AM    THER/PROPH/DIAG INJ SC/IM    Reviewed

 

                    2015 12:00 AM    B12 Injection, Up to 1000 Mcg NDC#1613-2358-27    Reviewed







Results Summary







                          Date and Description      Results

 

                          2004 12:00 AM        Colonoscopy-Women and Men over 50 Normal 

 

                          2008 12:00 AM         Pap Smear Declined 

 

                          10/7/2009 12:00 AM        Cholest Cry Stone Ql .0 %LDLc SerPl-mCnc 123.0 mg/dLHDLc

 SerPl-mCnc 34.0 mg/dLTrigl SerPl-mCnc 190.0 mg/dLGlucose SerPl-mCnc 78.0 mg/dL

 

                          2009 12:00 AM        Mammogram -Women over 40 Normal HIV1+2 Ab Ser Ql no risk 

 

                          2010 8:47 AM         Dexa Bone Scan Refused Aspirin reccommended Contraindication 



 

                          2010 8:48 AM         Depression Done 

 

                          2010 12:00 AM         Foot Exam-Diabetic Done 

 

                          2010 12:00 AM         Cholest Cry Stone Ql .0 %LDLc SerPl-mCnc 126.0 mg/dLGlucose

 SerPl-mCnc 102.0 mg/dL

 

                          2010 8:45 AM          TRIGLYCERIDES 122.0 mg/dLCHOLESTEROL 186.0 mg/dLHDL 36.0 mg/dLTOT

 CHOL/HDL 5.2 LDL (CALC) 126.0 mg/dLGLUCOSE 102.0 mg/dLSODIUM 143.0 
mmol/LPOTASSIUM 3.70 mmol/LCHLORIDE 111.0 mmol/LCO2 23.0 mmol/LBUN 10.0 
mg/dLCREATININE 0.80 mg/dLSGOT/AST 12.0 IU/LSGPT/ALT 11.0 IU/LALK PHOS 65.0 
IU/LTOTAL PROTEIN 7.20 g/dLALBUMIN 3.90 g/dLTOTAL BILI 0.50 mg/dLCALCIUM 10.20 
mg/dLAGE 59 GFR NonAA 73 GFR AA 88 eGFR >60 mL/min/1.73 m2eGFR AA* >60 WBC 5.7 
RBC 3.26 HGB 10.60 g/dLHCT 31.70 %MCV 97.0 fLMCH 32.50 pgMCHC 33.40 g/dLRDW SD 
47 RDW CV 13.30 %MPV 9.70 fLPLT 287 NRBC# 0.00 NRBC% 0.0 %NEUT 62.90 %%LYMP 
21.80 %%MONO 9.90 %%EOS 5.0 %%BASO 0.40 %#NEUT 3.56 #LYMP 1.23 #MONO 0.56 #EOS 
0.28 #BASO 0.02 MANUAL DIFF NOT IND 

 

                          2010 12:00 AM        Glucose SerPl-mCnc 96.0 mg/dLCholest Cry Stone Ql .0 %LDLc

 SerPl-mCnc 146.0 mg/dL

 

                          2010 8:26 AM         TRIGLYCERIDES 106.0 mg/dLCHOLESTEROL 199.0 mg/dLHDL 32.0 mg/dLTOT

 CHOL/HDL 6.2 LDL (CALC) 146.0 mg/dLGLUCOSE 96.0 mg/dLSODIUM 143.0 
mmol/LPOTASSIUM 4.0 mmol/LCHLORIDE 113.0 mmol/LCO2 24.0 mmol/LBUN 13.0 
mg/dLCREATININE 1.0 mg/dLSGOT/AST 11.0 IU/LSGPT/ALT 6.0 IU/LALK PHOS 56.0 
IU/LTOTAL PROTEIN 6.60 g/dLALBUMIN 3.80 g/dLTOTAL BILI 0.50 mg/dLCALCIUM 9.30 
mg/dLAGE 59 GFR NonAA 57 GFR AA 69 eGFR 57 eGFR AA* >60 

 

                          10/6/2010 12:00 AM        Cholest Cry Stone Ql .0 %LDLc SerPl-mCnc 111.0 mg/dLGlucose

 SerPl-mCnc 81.0 mg/dL

 

                          10/6/2010 2:45 PM         TRIGLYCERIDES 123.0 mg/dLCHOLESTEROL 178.0 mg/dLHDL 42.0 mg/dLTOT

 CHOL/HDL 4.2 LDL (CALC) 111.0 mg/dLGLUCOSE 81.0 mg/dLSODIUM 139.0 
mmol/LPOTASSIUM 4.10 mmol/LCHLORIDE 106.0 mmol/LCO2 24.0 mmol/LBUN 13.0 
mg/dLCREATININE 0.90 mg/dLSGOT/AST 13.0 IU/LSGPT/ALT 11.0 IU/LALK PHOS 61.0 
IU/LTOTAL PROTEIN 7.10 g/dLALBUMIN 3.90 g/dLTOTAL BILI 0.30 mg/dLCALCIUM 9.30 
mg/dLAGE 60 GFR NonAA 64 GFR AA 78 eGFR >60 mL/min/1.73 m2eGFR AA* >60 WBC 6.9 
RBC 3.59 HGB 11.50 g/dLHCT 35.30 %MCV 98.0 fLMCH 32.0 pgMCHC 32.60 g/dLRDW SD 46
 RDW CV 12.90 %MPV 9.90 fLPLT 311 NRBC# 0.00 NRBC% 0.0 %NEUT 64.90 %%LYMP 22.50 
%%MONO 7.20 %%EOS 5.10 %%BASO 0.30 %#NEUT 4.45 #LYMP 1.54 #MONO 0.49 #EOS 0.35 
#BASO 0.02 MANUAL DIFF NOT IND 

 

                          2011 12:00 AM         Mammogram -Women over 40 Ordered 

 

                          2011 10:25 AM        TRIGLYCERIDES 111.0 mg/dLCHOLESTEROL 195.0 mg/dLHDL 43.0 mg/dLTOT

 CHOL/HDL 4.5 LDL (CALC) 130.0 mg/dLWBC 5.3 RBC 3.76 HGB 12.0 g/dLHCT 37.80 %MCV
 101.0 fLMCH 31.90 pgMCHC 31.70 g/dLRDW SD 47 RDW CV 13.0 %MPV 9.70 fLPLT 259 
NRBC# 0.00 NRBC% 0.0 %NEUT 69.0 %%LYMP 17.60 %%MONO 8.30 %%EOS 4.70 %%BASO 0.40 
%#NEUT 3.63 #LYMP 0.93 #MONO 0.44 #EOS 0.25 #BASO 0.02 MANUAL DIFF NOT IND 
GLUCOSE 102.0 mg/dLSODIUM 146.0 mmol/LPOTASSIUM 4.20 mmol/LCHLORIDE 113.0 
mmol/LCO2 23.0 mmol/LBUN 15.0 mg/dLCREATININE 1.0 mg/dLSGOT/AST 12.0 
IU/LSGPT/ALT 17.0 IU/LALK PHOS 60.0 IU/LTOTAL PROTEIN 6.90 g/dLALBUMIN 4.20 
g/dLTOTAL BILI 0.40 mg/dLCALCIUM 9.70 mg/dLAGE 60 GFR NonAA 57 GFR AA 69 eGFR 57
 eGFR AA* >60 

 

                          2011 11:49 AM        Cholest Cry Stone Ql .0 %LDLc SerPl-mCnc 130.0 mg/dLHDLc

 SerPl-mCnc 43.0 mg/dLTrigl SerPl-mCnc 111.0 mg/dLGlucose SerPl-mCnc 102.0 mg/dL

 

                          2011 11:52 AM        Pap Smear Declined 

 

                          2011 11:28 AM        Lithium 2.080 mmol/LGLUCOSE 102.0 mg/dLSODIUM 135.0 mmol/LPOTASSIUM

 3.90 mmol/LCHLORIDE 106.0 mmol/LCO2 21.0 mmol/LBUN 12.0 mg/dLCREATININE 1.30 
mg/dLCALCIUM 10.70 mg/dLAGE 60 GFR NonAA 42 GFR AA 51 eGFR 42 eGFR AA* 51 

 

                          2011 8:58 AM          Lithium 0.690 mmol/L

 

                          2011 2:38 PM         VITAMIN B12 3483.0 pg/mL

 

                          2013 3:35 PM          WBC 5.1 RBC 3.73 HGB 11.70 g/dLHCT 36.40 %MCV 98.0 fLMCH 31.40

 pgMCHC 32.10 g/dLRDW SD 47 RDW CV 13.10 %MPV 9.80 fLPLT 224 NRBC# 0.00 NRBC% 
0.0 %NEUT 66.80 %%LYMP 19.10 %%MONO 9.0 %%EOS 4.90 %%BASO 0.20 %#NEUT 3.42 #LYMP
 0.98 #MONO 0.46 #EOS 0.25 #BASO 0.01 MANUAL DIFF NOT IND GLUCOSE 88.0 
mg/dLSODIUM 141.0 mmol/LPOTASSIUM 4.10 mmol/LCHLORIDE 110.0 mmol/LCO2 22.0 
mmol/LBUN 22.0 mg/dLCREATININE 1.10 mg/dLCALCIUM 9.80 mg/dLAGE 62 GFR NonAA 50 
GFR AA 61 eGFR 50 eGFR AA* 60 Lithium 0.760 mmol/L

 

                          2013 11:02 AM        TRIGLYCERIDES 106.0 mg/dLCHOLESTEROL 181.0 mg/dLHDL 46.0 mg/dLTOT

 CHOL/HDL 3.9 LDL (CALC) 114.0 mg/dLVITAMIN D 41.10 ng/mL

 

                          10/17/2014 10:10 AM       WBC 5.0 RBC 3.66 HGB 11.60 g/dLHCT 36.80 %.0 fLMCH 31.70

 pgMCHC 31.50 g/dLRDW SD 50 RDW CV 13.50 %MPV 10.10 fLPLT 209 NRBC# 0.00 NRBC% 
0.0 %NEUT 69.20 %%LYMP 21.0 %%MONO 6.40 %%EOS 3.20 %%BASO 0.20 %#NEUT 3.46 #LYMP
 1.05 #MONO 0.32 #EOS 0.16 #BASO 0.01 MANUAL DIFF NOT IND GLUCOSE 100.0 
mg/dLSODIUM 148.0 mmol/LPOTASSIUM 3.90 mmol/LCHLORIDE 114.0 mmol/LCO2 26.0 
mmol/LBUN 12.0 mg/dLCREATININE 1.20 mg/dLSGOT/AST 9.0 IU/LSGPT/ALT <6 IU/LALK 
PHOS 82.0 IU/LTOTAL PROTEIN 6.90 g/dLALBUMIN 4.0 g/dLTOTAL BILI 0.40 
mg/dLCALCIUM 10.50 mg/dLAGE 64 GFR NonAA 45 GFR AA 55 eGFR 45 eGFR AA* 55 
TRIGLYCERIDES 96.0 mg/dLCHOLESTEROL 155.0 mg/dLHDL 38.0 mg/dLTOT CHOL/HDL 4.1 
LDL (CALC) 98.0 mg/dLLithium 0.850 mmol/LCancer Antigen (CA) 125 8.30 U/mL

 

                          2015 10:25 AM        Lithium 0.790 mmol/LWBC 4.8 RBC 3.44 HGB 11.0 g/dLHCT 35.20 

%.0 fLMCH 32.0 pgMCHC 31.30 g/dLRDW SD 53 RDW CV 14.0 %MPV 9.30 fLPLT 210
 NRBC# 0.00 NRBC% 0.0 %NEUT 70.80 %%LYMP 17.20 %%MONO 8.10 %%EOS 3.50 %%BASO 
0.40 %#NEUT 3.41 #LYMP 0.83 #MONO 0.39 #EOS 0.17 #BASO 0.02 MANUAL DIFF NOT IND 
TRIGLYCERIDES 107.0 mg/dLCHOLESTEROL 174.0 mg/dLHDL 43.0 mg/dLTOT CHOL/HDL 4.0 
LDL (CALC) 110.0 mg/dLGLUCOSE 90.0 mg/dLSODIUM 145.0 mmol/LPOTASSIUM 3.80 
mmol/LCHLORIDE 115.0 mmol/LCO2 24.0 mmol/LBUN 17.0 mg/dLCREATININE 1.30 
mg/dLSGOT/AST 18.0 IU/LSGPT/ALT 17.0 IU/LALK PHOS 56.0 IU/LTOTAL PROTEIN 6.70 
g/dLALBUMIN 3.90 g/dLTOTAL BILI 0.40 mg/dLCALCIUM 9.80 mg/dLAGE 64 GFR NonAA 41 
GFR AA 50 eGFR 41 eGFR AA* 50 

 

                          2015 8:50 AM        WBC 5.8 RBC 3.29 HGB 10.70 g/dLHCT 34.0 %.0 fLMCH 32.50

 pgMCHC 31.50 g/dLRDW SD 52 RDW CV 13.60 %MPV 9.60 fLPLT 223 NRBC# 0.00 NRBC% 
0.0 %NEUT 69.60 %%LYMP 18.90 %%MONO 8.50 %%EOS 2.80 %%BASO 0.20 %#NEUT 4.03 
#LYMP 1.09 #MONO 0.49 #EOS 0.16 #BASO 0.01 MANUAL DIFF NOT IND Lithium 0.620 
mmol/LGLUCOSE 83.0 mg/dLSODIUM 139.0 mmol/LPOTASSIUM 3.90 mmol/LCHLORIDE 109.0 
mmol/LCO2 22.0 mmol/LBUN 19.0 mg/dLCREATININE 1.40 mg/dLSGOT/AST 19.0 
IU/LSGPT/ALT 21.0 IU/LALK PHOS 55.0 IU/LTOTAL PROTEIN 6.50 g/dLALBUMIN 3.90 
g/dLTOTAL BILI 0.50 mg/dLCALCIUM 9.60 mg/dLAGE 65 GFR NonAA 38 GFR AA 46 eGFR 38
 eGFR AA* 46 TRIGLYCERIDES 121.0 mg/dLCHOLESTEROL 192.0 mg/dLHDL 51.0 mg/dLTOT 
CHOL/HDL 3.8 .0 mg/dLFREE T4 0.79 TSH 1.210 uIU/mLHemoglobin A1c 5.40 
%Estim. Avg Glu (eAG) 108 

 

                          3/15/2016 8:08 AM         VITAMIN B12 696.0 pg/mL

 

                          3/23/2016 8:26 AM         WBC 7.0 RBC 3.61 HGB 11.80 g/dLHCT 37.70 %.0 fLMCH 32.70

 pgMCHC 31.30 g/dLRDW SD 49 RDW CV 12.50 %MPV 10.0 fLPLT 207 NRBC# 0.00 NRBC% 
0.0 %NEUT 73.60 %%LYMP 16.40 %%MONO 6.60 %%EOS 3.0 %%BASO 0.30 %#NEUT 5.15 #LYMP
 1.15 #MONO 0.46 #EOS 0.21 #BASO 0.02 MANUAL DIFF NOT IND Lithium 0.940 
mmol/LGLUCOSE 108.0 mg/dLSODIUM 143.0 mmol/LPOTASSIUM 4.30 mmol/LCHLORIDE 110.0 
mmol/LCO2 27.0 mmol/LBUN 16.0 mg/dLCREATININE 1.60 mg/dLSGOT/AST 13.0 
IU/LSGPT/ALT 7.0 IU/LALK PHOS 71.0 IU/LTOTAL PROTEIN 6.80 g/dLALBUMIN 4.0 
g/dLTOTAL BILI 0.20 mg/dLCALCIUM 10.40 mg/dLAGE 65 GFR NonAA 32 GFR AA 39 eGFR 
32 eGFR AA* 39 TRIGLYCERIDES 113.0 mg/dLCHOLESTEROL 169.0 mg/dLHDL 42.0 mg/dLTOT
 CHOL/HDL 4.0 LDL (CALC) 104.0 mg/dLFREE T4 0.86 TSH 2.20 uIU/mLHemoglobin A1c 
5.20 %Estim. Avg Glu (eAG) 103 

 

                          3/25/2016 9:17 AM         COLOR YELLOW APPEARANCE CLEAR SPEC GRAV 1.010 pH 7.0 PROTEIN 

NEGATIVE GLUCOSE NEGATIVE mg/dLKETONE NEGATIVE BILIRUBIN NEGATIVE BLOOD NEGATIVE
 NITRITE NEGATIVE [place] SCREEN SMALL MICRO IND? SEE BELOW WBC/HPF 0-5 RBC/HPF 
NEGATIVE CASTS/LPF NEGATIVE /LPFCRYSTALS NEGATIVE MUCOUS THRDS NEGATIVE BACTERIA
 NEGATIVE EPITH CELLS FEW SQUAMOUS /HPFTRICHOMONAS NEGATIVE YEAST NEGATIVE 

 

                          2016 6:00 AM        GLUCOSE 91.0 mg/dLSODIUM 143.0 mmol/LPOTASSIUM 3.60 mmol/LCHLORIDE

 112.0 mmol/LCO2 23.0 mmol/LBUN 22.0 mg/dLCREATININE 1.20 mg/dLSGOT/AST 15.0 
IU/LSGPT/ALT 12.0 IU/LALK PHOS 61.0 IU/LTOTAL PROTEIN 5.40 g/dLALBUMIN 3.10 
g/dLTOTAL BILI 0.40 mg/dLCALCIUM 8.40 mg/dLAGE 66 GFR NonAA 45 GFR AA 55 eGFR 45
 eGFR AA* 55 WBC 3.0 RBC 3.05 HGB 9.80 g/dLHCT 32.10 %.0 fLMCH 32.10 
pgMCHC 30.50 g/dLRDW SD 54 RDW CV 14.20 %MPV 10.10 fLPLT 170 NRBC# 0.00 NRBC% 
0.0 %NEUT 50.70 %%LYMP 32.60 %%MONO 10.50 %%EOS 5.90 %%BASO 0.30 %#NEUT 1.54 
#LYMP 0.99 #MONO 0.32 #EOS 0.18 #BASO 0.01 MANUAL DIFF NOT IND 

 

                          2016 2:09 PM        COLOR YELLOW APPEARANCE CLEAR SPEC GRAV 1.010 pH 5.0 PROTEIN

 30 GLUCOSE NEGATIVE mg/dLKETONE NEGATIVE BILIRUBIN NEGATIVE BLOOD LARGE NITRITE
 NEGATIVE [place] SCREEN MODERATE MICRO INDICATED? SEE BELOW WBC/HPF  RBC/HPF
 20-50 CASTS/LPF NEGATIVE /LPFCRYSTALS NEGATIVE MUCOUS THRDS NEGATIVE BACTERIA 
NEGATIVE EPITH CELLS FEW SQUAMOUS /HPFTRICHOMONAS NEGATIVE YEAST NEGATIVE CULT 
SET UP? YES 

 

                          1/3/2017 4:08 PM          COLOR YELLOW APPEARANCE HAZY SPEC GRAV 1.015 pH 6.0 PROTEIN 30

 GLUCOSE NEGATIVE mg/dLKETONE NEGATIVE BILIRUBIN NEGATIVE BLOOD MODERATE NITRITE
 NEGATIVE [place] SCREEN LARGE MICRO INDICATED? SEE BELOW WBC/-200 RBC/HPF 
5-10 CASTS/LPF NEGATIVE /LPFCRYSTALS NEGATIVE MUCOUS THRDS NEGATIVE BACTERIA 
NEGATIVE EPITH CELLS 1+ SQUAMOUS /HPFTRICHOMONAS NEGATIVE YEAST NEGATIVE CULT 
SET UP? YES 

 

                          2017 4:24 PM         CREATININE 1.50 mg/dLAGE 66 GFR NonAA 35 GFR AA 42 eGFR 35 eGFR

 AA* 42 VITAMIN B12 1324.0 pg/mL

 

                          2017 11:30 AM         GLUCOSE 93.0 mg/dLSODIUM 143.0 mmol/LPOTASSIUM 3.10 mmol/LCHLORIDE

 101.0 mmol/LCO2 29.0 mmol/LBUN 26.0 mg/dLCREATININE 1.50 mg/dLSGOT/AST 23.0 
IU/LSGPT/ALT 13.0 IU/LALK PHOS 66.0 IU/LTOTAL PROTEIN 7.70 g/dLALBUMIN 4.30 
g/dLTOTAL BILI 0.40 mg/dLCALCIUM 10.30 mg/dLAGE 66 GFR NonAA 35 GFR AA 42 eGFR 
35 eGFR AA* 42 TRIGLYCERIDES 147.0 mg/dLCHOLESTEROL 184.0 mg/dLHDL 44.0 mg/dLTOT
 CHOL/HDL 4.2 .0 mg/dLWBC 5.4 RBC 3.98 HGB 12.90 g/dLHCT 40.20 %.0
 fLMCH 32.40 pgMCHC 32.10 g/dLRDW SD 50 RDW CV 13.50 %MPV 9.30 fLPLT 210 NRBC# 
0.00 NRBC% 0.0 %NEUT 54.20 %%LYMP 30.70 %%MONO 9.10 %%EOS 5.20 %%BASO 0.40 
%#NEUT 2.94 #LYMP 1.66 #MONO 0.49 #EOS 0.28 #BASO 0.02 MANUAL DIFF NOT IND FREE 
T4 1.09 COLOR YELLOW APPEARANCE CLEAR SPEC GRAV <=1.005 pH 5.5 PROTEIN NEGATIVE 
GLUCOSE NEGATIVE mg/dLKETONE NEGATIVE BILIRUBIN NEGATIVE BLOOD NEGATIVE NITRITE 
NEGATIVE [place] SCREEN LARGE MICRO INDICATED? SEE BELOW WBC/HPF 10-20 RBC/HPF 0-5 
CASTS/LPF NEGATIVE /LPFCRYSTALS NEGATIVE MUCOUS THRDS NEGATIVE BACTERIA FEW 
EPITH CELLS 1+ SQUAMOUS /HPFTRICHOMONAS NEGATIVE YEAST NEGATIVE CULT SET UP? YES
 TSH 1.820 uIU/mL

 

                          2017 2:45 PM         COLOR YELLOW APPEARANCE CLEAR SPEC GRAV <=1.005 pH 6.0 PROTEIN

 NEGATIVE GLUCOSE NEGATIVE mg/dLKETONE NEGATIVE BILIRUBIN NEGATIVE BLOOD TRACE-
INTACT NITRITE NEGATIVE [place] SCREEN SMALL MICRO INDICATED? SEE BELOW WBC/HPF 0-5
 RBC/HPF NEGATIVE CASTS/LPF NEGATIVE /LPFCRYSTALS NEGATIVE MUCOUS THRDS NEGATIVE
 BACTERIA FEW EPITH CELLS FEW SQUAMOUS /HPFTRICHOMONAS NEGATIVE YEAST NEGATIVE 
CULT SET UP? YES 

 

                          2017 11:22 AM        COLOR YELLOW APPEARANCE CLEAR SPEC GRAV <=1.005 pH 6.5 PROTEIN

 NEGATIVE GLUCOSE NEGATIVE mg/dLKETONE NEGATIVE BILIRUBIN NEGATIVE BLOOD 
NEGATIVE NITRITE NEGATIVE LEUK SCREEN NEGATIVE MICRO INDICATED? NOT INDICATED 

 

                          2017 2:18 PM         Clarity Ur cloudy Color Ur dark yellow Glucose Ur-sCnc negative

 Bilirub Ur Ql Strip negative Ketones Ur Ql Strip negative Sp Gr Ur Qn 1.010 Hgb
 Ur Ql Strip trace-intact pH Ur-LsCnc 6.5 Prot Ur Ql Strip trace Urobilinogen 
Ur-mCnc 0.2 Nitrite Ur Ql Strip negative WBC Est Ur Ql Strip large 

 

                          2017 9:28 AM         GLUCOSE 109.0 mg/dLSODIUM 142.0 mmol/LPOTASSIUM 3.60 mmol/LCHLORIDE

 106.0 mmol/LCO2 23.0 mmol/LBUN 11.0 mg/dLCREATININE 1.30 mg/dLCALCIUM 9.80 
mg/dLAGE 66 GFR NonAA 41 GFR AA 50 eGFR 41 eGFR AA* 50 

 

                          2017 9:52 AM         COLOR YELLOW APPEARANCE CLOUDY SPEC GRAV <=1.005 pH 6.5 PROTEIN

 NEGATIVE GLUCOSE NEGATIVE mg/dLKETONE NEGATIVE BILIRUBIN NEGATIVE BLOOD SMALL 
NITRITE POSITIVE [place]UK SCREEN LARGE MICRO INDICATED? SEE BELOW WBC/-300 
RBC/HPF 5-10 CASTS/LPF NEGATIVE /LPFCRYSTALS NEGATIVE MUCOUS THRDS NEGATIVE 
BACTERIA 2++ EPITH CELLS 1+ SQUAMOUS /HPFTRICHOMONAS NEGATIVE YEAST NEGATIVE 
CULT SET UP? YES 







History Of Immunizations







       Name    Date Admin    Mfg Name    Mfg Code    Trade Name    Lot#    Route    Inj    Vis Given    Vis

 Pub                                    CVX

 

        Influenza    2008    Not Entered    NE      Not Entered            Not Entered    Not Entered

                    1            999

 

        X       12/19/2008    [employer]    MSD     Pneumovax 23            Intramuscular    Not Entered

                    1            999

 

           Influenza    10/15/2010    [employer]    NOV        Fluvirin > 12 Years    

[id number]     Intramuscular    Left Deltoid    10/15/2010    2009    999

 

          X         3/23/2015    [facility]    [facility]       Prevnar 13    [id number]    Intramuscular

                Right Gluteous Medius    3/23/2015       2013       109







History of Past Illness







                    Name                Date of Onset       Comments

 

                    Peritoneal Neoplasm, Malignant                         

 

                    Hyperlipidemia                           

 

                    Bipolar disorder, unspecified                         

 

                    Artificial opening status; colostomy                         

 

                    B12 deficiency                           

 

                    Anemia, Pernicious                         

 

                    Arthritis unspecified                         

 

                    cervical cancer                          

 

                    Artificial opening status; colostomy    2010  1:10PM     

 

                    Bipolar disorder, unspecified    2010  1:10PM     

 

                    Hyperlipidemia      2010  1:10PM     

 

                    Anemia, Pernicious    2010  1:10PM     

 

                    Postoperative Follow-Up    2010  1:55PM     

 

                    Postoperative Follow-Up    Mar  8 2010 10:57AM     

 

                    Artificial opening status; colostomy    Mar  8 2010  1:19PM     

 

                    Peritoneal Neoplasm, Malignant    Mar  8 2010  1:19PM     

 

                    Artificial opening status; colostomy    2010  1:40PM     

 

                    Hyperlipidemia      2010  1:40PM     

 

                    Anemia, Pernicious    2010  1:40PM     

 

                    Peritoneal Neoplasm, Malignant    2010  1:40PM     

 

                    Arthritis unspecified    2010  1:40PM     

 

                    Anemia of Chronic Illness    2010  1:40PM     

 

                    Tinea corporis      2010  3:17PM     

 

                    Bipolar disorder, unspecified    2010  1:33PM     

 

                    Hyperlipidemia      2010  1:33PM     

 

                    Anemia, Pernicious    2010  1:33PM     

 

                    Peritoneal Neoplasm, Malignant    2010  1:33PM     

 

                    B12 deficiency      2010  1:33PM     

 

                    Ethmoidal Sinusitis, Acute    Sep 21 2010  3:53PM     

 

                    Wheezing            Sep 21 2010  3:53PM     

 

                    Flu                 Oct 15 2010  1:40PM     

 

                    Bipolar disorder, unspecified    Oct 15 2010  1:42PM     

 

                    Hyperlipidemia      Oct 15 2010  1:42PM     

 

                    Anemia, Pernicious    Oct 15 2010  1:42PM     

 

                    Peritoneal Neoplasm, Malignant    Oct 15 2010  1:42PM     

 

                    Bipolar disorder, unspecified    2011 12:01PM     

 

                    Hyperlipidemia      2011 12:01PM     

 

                    Anemia, Pernicious    2011 12:01PM     

 

                    Peritoneal Neoplasm, Malignant    2011 12:01PM     

 

                    Bipolar disorder, unspecified    Apr 15 2011 10:55AM     

 

                    Major Depression    2011 10:11AM     

 

                    Bipolar Disorder    2011 10:11AM     

 

                    Cancer              May 10 2011  4:16PM     

 

                    Major Depression    May 10 2011  3:16PM     

 

                    Bipolar Disorder    May 10 2011  3:16PM     

 

                    Hypercalcemia       May 23 2011  2:47PM     

 

                    Bipolar disorder, unspecified    May 23 2011  2:47PM     

 

                    Colon Cancer, Personal History    May 23 2011  2:47PM     

 

                    Bipolar Disorder    May 31 2011  4:39PM     

 

                    Depressive Disorder    2011 10:01AM     

 

                    Vitamin B12 deficiency    2011 10:01AM     

 

                    Vitamin D Deficiency    2011  5:07PM     

 

                    Anemia, Vitamin B12 Deficiency    2011  5:07PM     

 

                    B12 deficiency      2011  3:56PM     

 

                    Routine gynecological examination    Aug  4 2011  9:08AM     

 

                    Screening Examination for Breast Cancer    Aug  4 2011  9:08AM     

 

                    Tinea Corporis      Aug  4 2011  9:08AM     

 

                    Depressive Disorder    Sep 23 2011  8:47AM     

 

                    Contact Dermatitis    Sep 23 2011  8:47AM     

 

                    Anemia, Pernicious    Sep 23 2011  8:47AM     

 

                    B12 deficiency      Sep 23 2011  8:47AM     

 

                    B12 deficiency      Sep 27 2011  2:58PM     

 

                    B12 deficiency      Oct 20 2011  2:34PM     

 

                    Flu                 Dec  9 2011  3:16PM     

 

                    B12 deficiency      Dec  9 2011  3:17PM     

 

                    B12 deficiency      2012  4:52PM     

 

                    B12 deficiency      Feb 2012 11:10AM     

 

                    B12 deficiency      2012  3:37PM     

 

                    B12 deficiency      May  3 2012  4:10PM     

 

                    B12 deficiency      2012  2:54PM     

 

                    B12 deficiency      2012 11:23AM     

 

                    B12 deficiency      Aug  9 2012  2:08PM     

 

                    B12 deficiency      Sep  6 2012  4:36PM     

 

                    B12 deficiency      Oct 16 2012 10:23AM     

 

                    Flu                 Feb  4   3:11PM     

 

                    Bipolar disorder, unspecified    Feb  2013  2:48PM     

 

                    Anemia, Pernicious    Feb  2013  2:48PM     

 

                    B12 deficiency      Feb  2013  2:48PM     

 

                    Extrapyramidal abnormal movement disorder    Feb  2013  2:48PM     

 

                    B12 deficiency      Apr  3 2013 12:03PM     

 

                    Bipolar disorder, unspecified    May  7 2013  1:31PM     

 

                    Anemia, Pernicious    May  7 2013  1:31PM     

 

                    B12 deficiency      May  7 2013  1:31PM     

 

                    Extrapyramidal abnormal movement disorder    May  7 2013  1:31PM     

 

                    B12 deficiency      2013  3:42PM     

 

                    B12 deficiency      2013  1:31PM     

 

                    Hyperlipidemia      Aug  7 2013 10:37AM     

 

                    Vitamin D Deficiency    Aug  7 2013 10:37AM     

 

                    Bipolar disorder, unspecified    Aug  7 2013 10:37AM     

 

                    Anemia, Pernicious    Aug  7 2013 10:37AM     

 

                    B12 deficiency      Aug  7 2013 10:37AM     

 

                    B12 deficiency      Sep 25 2013 11:15AM     

 

                    B12 deficiency      Dec 11 2013  3:16PM     

 

                    B12 deficiency      Mar  6 2014  1:48PM     

 

                    B12 deficiency      May 21 2014  3:17PM     

 

                    Screening Examination for Breast Cancer    2014  3:23PM     

 

                    Periumbilical abdominal pain    2014  3:23PM     

 

                    B12 deficiency      Jul 10 2014  2:52PM     

 

                    Anemia, Vitamin B12 Deficiency    Aug 13 2014  4:50PM     

 

                    Bipolar disorder    Oct 16 2014 11:13AM     

 

                    Hyperlipidemia      Oct 16 2014 11:13AM     

 

                    Anemia, Pernicious    Oct 16 2014 11:13AM     

 

                    Peritoneal Neoplasm, Malignant    Oct 16 2014 11:13AM     

 

                    Screening breast examination    Oct 16 2014 11:13AM     

 

                    Weight loss         Oct 16 2014 11:13AM     

 

                    Anemia, Pernicious    Mar 23 2015  2:57PM     

 

                    B12 deficiency      Mar 23 2015  2:57PM     

 

                    Need for Prevnar vaccine    Mar 23 2015  2:57PM     

 

                    Bipolar disorder    Mar 23 2015  2:57PM     

 

                    Hyperlipidemia      Mar 23 2015  2:57PM     

 

                    Anemia, Pernicious    Mar 23 2015  2:57PM     

 

                    Peritoneal Neoplasm, Malignant    Mar 23 2015  2:57PM     

 

                    B12 deficiency      May  4 2015  4:48PM     

 

                    Hyperlipidemia      May 13 2015  9:56AM     

 

                    Anemia              May 13 2015  9:56AM     

 

                    Bipolar disorder    May 13 2015  9:56AM     

 

                    Bipolar disorder    May 14 2015  3:27PM     

 

                    Hyperlipidemia      May 14 2015  3:27PM     

 

                    Anemia, Pernicious    May 14 2015  3:27PM     

 

                    Peritoneal Neoplasm, Malignant    May 14 2015  3:27PM     

 

                    B12 deficiency      2015  2:20PM     

 

                    B12 deficiency      2015 11:34AM     

 

                    B12 deficiency      Aug 18 2015  9:06AM     

 

                    Tinea Corporis      Sep 18 2015  8:54AM     

 

                    B12 deficiency      Sep 18 2015  8:54AM     

 

                    B12 deficiency      2015 10:28AM     

 

                    Herpes zoster without complication    Dec  3 2015  9:52AM     

 

                    B12 deficiency      Dec 23 2015 11:21AM     

 

                    B12 deficiency      2016  4:51PM     

 

                    Vitamin B 12 deficiency    Mar 14 2016  5:35PM     

 

                    B12 deficiency      Mar 15 2016 12:14PM     

 

                    B12 deficiency      May  5 2016 11:30AM     

 

                    Edema               May  5 2016 11:30AM     

 

                    Dermatitis          May  5 2016 11:30AM     

 

                    Edema               May 17 2016  8:38AM     

 

                    Shortness of breath    May 17 2016  8:38AM     

 

                    Bilateral edema of lower extremity    2016  2:06PM     

 

                    B12 deficiency      2016  2:06PM     

 

                    B12 deficiency      2016 11:50AM     

 

                    B12 deficiency      2016 11:20AM     

 

                    Diarrhea            Aug  2 2016  3:13PM     

 

                    B12 deficiency      Aug 24 2016 11:10AM     

 

                    Encounter for screening mammogram for breast cancer    Aug 24 2016 11:44AM     

 

                    B12 deficiency      Sep 28 2016  2:35PM     

 

                    B12 deficiency      Dec 15 2016  2:02PM     

 

                    Dysuria             Dec 29 2016 12:14PM     

 

                    Hematuria           Fidencio  3 2017  1:33PM     

 

                    Constipation by delayed colonic transit    2017  1:52PM     

 

                    Ileus               2017  1:52PM     

 

                    UTI (urinary tract infection)    Fidencio 15 2017  3:39PM     

 

                    Acute cystitis with hematuria    2017 11:07AM     

 

                    B12 deficiency      2017 11:07AM     

 

                    B12 deficiency      2017 11:40AM     

 

                    B12 deficiency      2017  4:07PM     

 

                    Slurred speech      2017  3:07PM     

 

                    Vitamin B12 deficiency    2017  3:07PM     

 

                    Dysphagia, unspecified type    2017  3:07PM     

 

                    Hyperlipidemia      2017  3:07PM     

 

                    Dysuria             2017 12:01PM     

 

                    B12 deficiency      2017  2:08PM     

 

                    Dysuria             2017 10:58AM     

 

                    Hematuria           May 22 2017  1:36PM     

 

                    Depressive Disorder    May 22 2017  1:36PM     

 

                    Constipation        May 22 2017  1:36PM     

 

                    Fatigue             May 22 2017  1:36PM     

 

                    Urinary tract infection    May 30 2017  9:36AM     

 

                    Hypokalemia         May 30 2017 12:03PM     

 

                    Urinary tract infection    2017  4:36PM     







Payers







           Insurance Name    Company Name    Plan Name    Plan Number    Policy Number    Policy Group

 Number                                 Start Date

 

                    Medicare RHC    Medicare RHC              [id number]              N/A

 

                          [employer]    [employer]                 [id number]

                                                    

 

                    Medicare Part A    Medicare - Lab/Xray              [id number]              2006

 

                    Medicare Part B    Medic[employer] [place]              [id number]              2006

 

                          [employer]    [employer]                 50 percent

                                                    2009

 

                    Human    [employer]              [id number]              N/A

 

                    Medicare Part A    Medicare Part A              [id number]              N/A

 

                    Medicare Part A    Medicare Part A              [id number]              2006









History of Encounters







                    Visit Date          Visit Type          Provider

 

                    2017           Office visit        Bhupinder Louise DO

 

                    2017           Nurse visit         Bhupinder Louise DO

 

                    2017           Office visit         

 

                    2017           Office visit        Bhupinder Louise DO

 

                    2017           Nurse visit         Bhupinder Louise DO

 

                    2017           Office visit        Radha Ontiveros APRN

 

                    1/15/2017           Office visit        Aj Tapia NP

 

                    2017            Office visit        Devin Masterson MD

 

                    2016          [facility]            Devin Masterson MD

 

                    12/15/2016          Nurse visit         Bhupinder Louise DO

 

                    2016           Nurse visit         Bret GREEN

 

                    2016           Nurse visit         Bhupinder Louise DO

 

                    2016            Office visit        Bhupinder Louise DO

 

                    2016           Nurse visit         Bhupinder Louise DO

 

                    2016           Office visit        Bret GREEN

 

                    2016            Office visit        Bhupinder Louise DO

 

                    3/15/2016           Nurse visit         Bhupinder Louise DO

 

                    2016            Nurse visit         Bhupinder Aspen DO

 

                    2015          Nurse visit         Bhupinder Aspen DO

 

                    12/3/2015           Office visit        Bhupinder Aspen DO

 

                    2015          Nurse visit         Bhupinder Aspen DO

 

                    2015           Office visit        Bhupinder Aspen DO

 

                    2015           Nurse visit         Bhupinder Aspen DO

 

                    2015            Nurse visit         Bhupinder Aspen DO

 

                    2015            Nurse visit         Bhupinder Aspen DO

 

                    2015           Office visit        Bhupinder Aspen DO

 

                    2015            Nurse visit         Bhupinder Aspen DO

 

                    3/23/2015           Office visit        Bhupinder Aspen DO

 

                    10/16/2014          Office visit        Bhupinder Aspen DO

 

                    2014           Nurse visit         Radha Weston GREEN

 

                    7/10/2014           Nurse visit         Bhupinder Aspen DO

 

                    2014           Office visit        Bhupinder Aspen DO

 

                    2014           Nurse visit         Bhupinder Aspen DO

 

                    3/6/2014            Nurse visit         Bhupinder Aspen DO

 

                    2014            [facility]            EARNEST Lopez MD

 

                    2013          Nurse visit         Bhupinder Aspen DO

 

                    2013           Nurse visit         Bhupinder Aspen DO

 

                    2013            Office visit        Bhupinder Aspen DO

 

                    2013            Nurse visit         Bhupinder Aspen DO

 

                    2013            Nurse visit         Bhupinder Aspen DO

 

                    2013            Office visit        Bhupinder Aspen DO

 

                    4/3/2013            Nurse visit         Bhupinder Aspen DO

 

                    2013            Office visit        Bhupinder Aspen DO

 

                    10/16/2012          Nurse visit         Bhupinder Aspen DO

 

                    2012            Nurse visit         Bhupinder Aspen DO

 

                    2012            Voided              Bhupinder Aspen DO

 

                    2012            Nurse visit         Bhupinder Aspen DO

 

                    2012            Nurse visit         Bhupinder Aspen DO

 

                    2012           Nurse visit         Bhupinder Aspen DO

 

                    5/3/2012            Nurse visit         Bhupinder Aspen DO

 

                    2012           Nurse visit         Bhupinder Aspen DO

 

                    2012           Nurse visit         Bhupinder Aspen DO

 

                    2012           Nurse visit         Bhupinder Aspen DO

 

                    2011           Nurse visit         Bhupinder Aspen DO

 

                    10/20/2011          Nurse visit         Bhupinder Aspen DO

 

                    2011           Office visit        Bhupinder Louise DO

 

                    2011           Nurse visit         Radha GREEN

 

                    2011            Office visit        Bhupinder Louise DO

 

                    2011           Nurse visit         Bhupinder Louise DO

 

                    2011            Office visit        Bhupinder Aspen DO

 

                    2011           Office visit        Bhupinder Aspen DO

 

                    5/10/2011           Office visit        Bhupinder Louise DO

 

                    2011           Office visit        Bhupinder Louise DO

 

                    4/15/2011           Office visit        Devin Angel DO

 

                    2011           Office visit        Devin Angel DO

 

                    10/15/2010          Office visit        Devin Angel DO

 

                    2010           Office visit        Devin Angel DO

 

                    2010            Office visit        Devin Angel DO

 

                    2010           Office visit        Devin Angel DO

 

                    2010            Office visit        Devin Angel DO

 

                    3/8/2010            Office visit        Devin Masterson MD

 

                    2010            Surgery             Devin Masterson MD

 

                    2010            Office visit        Devin Angel DO

 

                    2010           Surgery             Devin Masterson MD

 

                    2010           Hospital            Devin Masterson MD

 

                    2010           [facility]            Devin Masterson MD

 

                    10/22/2009          Office visit        Devin Angel DO

## 2019-06-26 NOTE — XMS REPORT
MU2 Ambulatory Summary

                             Created on: 2016



Pauline Gan

External Reference #: [id number]

: 1950

Sex: Female



Demographics







                          Address                   [address]

[place]  [id number]

 

                          Home Phone                [phone]

 

                          Preferred Language        English

 

                          Marital Status            Legally 

 

                          Restorationist Affiliation     Unknown

 

                          Race                      White

 

                          Ethnic Group              Not  or 





Author







                          Author                    Bhupinder Louise

 

                          [facility]

 

                          Address                   [address]

[place]  [id number]



 

                          Phone                     [phone]







Care Team Providers







                    Care Team Member Name    Role                Phone

 

                    Bhupinder Louise    PCP                 Unavailable







Allergies and Adverse Reactions







                    Name                Reaction            Notes

 

                    NO KNOWN DRUG ALLERGIES                         







Plan of Treatment







             Planned Activity    Comments     Planned Date    Planned Time    Plan/Goal

 

             THER/PROPH/DIAG INJ SC/IM                 2016    12:00 AM      

 

             COMPLETE CBC W/AUTO DIFF WBC                 2013     12:00 AM      

 

             ASSAY OF LITHIUM                 2013     12:00 AM      

 

             METABOLIC PANEL TOTAL CA                 2013     12:00 AM      

 

             VITAMIN B-12                 2013     12:00 AM      

 

             ASSAY OF FOLIC ACID SERUM                 2013     12:00 AM      

 

             THER/PROPH/DIAG INJ SC/IM                 2013    12:00 AM      







Medications







                                        Active 

 

             Name         Start Date    Estimated Completion Date    SIG          Comments

 

                Latuda 20 mg oral tablet                                    take 1 tablet (20 mg) by oral route once daily with

 food (at least 350 calories)            

 

             pravastatin 40 mg oral tablet    3/30/2015                 TAKE 1 TABLET BY MOUTH DAILY     

 

                Namenda XR 28 mg oral capsule,sprinkle,ER 24hr    2015                       take 1 capsule (28

 mg) by oral route once daily            

 

                Namenda XR 28 mg oral capsule,sprinkle,ER 24hr    2016                       take 1 capsule (28

 mg) by oral route once daily            

 

                triamcinolone acetonide 0.1 % topical cream    2016                        apply a thin layer to 

the affected area(s) by topical route 2 times per day     

 

                furosemide 40 mg oral tablet    2016      take 1 tablet (40 mg) by oral

 route once daily                        

 

                potassium chloride 10 mEq oral tablet extended release    2016                       take 1 tablet

 (10 meq) by oral route once daily       

 

             pravastatin 40 mg oral tablet    2016                 TAKE 1 TABLET BY MOUTH DAILY     

 

                Vitamin B-12 1,000 mcg/mL injection solution    2016                       inject 1 milliliter 

(1,000 mcg) by intramuscular route once a month     









                                         

 

             Name         Start Date    Expiration Date    SIG          Comments

 

             Reglan 10mg    3/29/2010    2010    one ac and hs     

 

                Keflex 500 mg oral capsule    2010       10/1/2010       take 1 capsule (500 mg) by oral

 route every 6 hours for 10 days         

 

                Bactrim -160 mg oral tablet    2011       take 1 tablet by oral route

 every 12 hours for 7 days               

 

                triamcinolone acetonide 0.1 % topical cream    2011      apply a thin

 layer to the affected area(s) by topical route 2 times per day     

 

                sertraline 100 mg oral tablet    4/10/2012       5/10/2012       take 1.5 tablets by oral route

 daily for 30 days                       

 

                ergocalciferol (vitamin D2) 50,000 unit oral capsule    4/15/2013       2013       TAKE

 ONE CAPSULE BY MOUTH ONCE A WEEK        

 

                CYANOCOBALAM 1000MCGINJ 1000 milliliter    2013       INJECT 1ML INTRAMUSCULAR

 ONCE A MONTH                            

 

                pravastatin 40 mg oral tablet    3/25/2014       3/20/2015       TAKE ONE TABLET BY MOUTH EVERY

 DAY                                     

 

                          Zostavax (PF) 19,400 unit/0.65 mL subcutaneous suspension for reconstitution    3/23/2015

                    3/24/2015           inject 0.65 milliliter by subcutaneous route once     

 

                famciclovir 500 mg oral tablet    12/3/2015       12/10/2015      take 1 tablet (500 mg) by

 oral route every 8 hours for 7 days     

 

                Cipro 500 mg oral tablet    2016       take 1 tablet (500 mg) by oral route

 2 times per day for 5 days              









                                        Discontinued 

 

             Name         Start Date    Discontinued Date    SIG          Comments

 

                Tylenol 325 mg oral tablet                    2013        take 1 - 2 tablets (325 -650 mg) by oral

 route every 4-6 hours as needed         

 

                Calcium 600 + D(3) 600 mg(1,500mg) -400 unit oral tablet                    2011       take 1 tablet

 by oral route 2 times a day            no longer taking

 

                Vitamin B-12 1,000 mcg oral tablet extended release    2010       take 1

 tablet by oral route daily             no longer taking

 

                Antifungal (clotrimazole) 1 % topical cream    2010       apply to the 

affected and surrounding areas of skin by topical route 2 times per day morning 
and evening                              

 

                sertraline 100 mg oral tablet    5/10/2011       2011       take 2 tablets (200 mg) by 

oral route once daily                   discontinued by Dr. Serrano

 

                mirtazapine 15 mg oral tablet                    2011        take 1 tablet (15 mg) by oral route 

once daily before bedtime               Dr. Serrano

 

                mirtazapine 15 mg oral tablet                    2011        take 1 tablet (15 mg) by oral route 

once daily before bedtime               dc'd by Dr. Serrano

 

                Pristiq 50 mg oral tablet extended release 24 hr                    2013        take 1 tablet (50

 mg) by oral route once daily           Dr. Serrano

 

                Pristiq 50 mg oral tablet extended release 24 hr                    2013        take 1 tablet (50

 mg) by oral route once daily           dose updated

 

                Vitamin B-12 1,000 mcg/mL injection solution    2011        inject 1 milliliter

 (1,000 mcg) by intramuscular route once a month    on list already

 

                    syringe with needle 1 mL 25 gauge x 1" miscellaneous syringe    2011

                          use for injection once a month     

 

                clotrimazole 1 % topical cream    2011        apply to the affected and surrounding

 areas of skin by topical route 2 times per day in the morning and evening     

 

                Vitamin D2 50,000 unit oral capsule    2011        take 1 capsule (50,000

 unit) by oral route once weekly        generic on list

 

                Pravachol 40 mg oral tablet    2012        take 1 tablet (40 mg) by oral 

route once daily for 90 days            generic on list

 

                lithium carbonate 300 mg oral capsule    2012        take 1 capsule by oral

 route daily                            dose updated

 

                Pristiq 100 mg oral tablet extended release 24 hr                    4/10/2012       take 1 and 1/2 

tablet (150 mg) by oral route once daily    Mental Health provider

 

                Pristiq 100 mg oral tablet extended release 24 hr                    4/10/2012       take 1 and 1/2 

tablet (150 mg) by oral route once daily    Discontinued by Dr Efrain [facility] at [facility]

 

                hydroxyzine HCl 50 mg oral tablet    10/16/2014      2015       take 1 tablet (50 mg) 

by oral route at bedtime                 

 

                lithium carbonate 300 mg oral capsule    2015       take 1 capsule (300

 mg) by oral route 2 for 30 days         

 

                fluconazole 100 mg oral tablet    2015       12/3/2015       take 1 tablet (100 mg) by 

oral route once a week                   

 

                ketoconazole 2 % topical cream    2015       12/3/2015       apply to the affected area(s)

 by topical route 2 times per day        

 

                prednisone 10 mg oral tablet    12/3/2015       2016        take 2 tablets (20 mg) by oral

 route once daily for 4 days 1 tablet daily for 4 days 0.5 tablet daily for 4 
days                                     







Problem List







                    Description         Status              Onset

 

                    Artificial opening status; colostomy    Active               

 

                    Bipolar disorder, unspecified    Active               

 

                    Hyperlipidemia      Active               

 

                    Peritoneal Neoplasm, Malignant    Active               

 

                    Anemia, Pernicious    Active               

 

                    Arthritis unspecified    Active               

 

                    B12 deficiency      Active               







Vital Signs







      Date    Time    BP-Sys(mm[Hg]    BP-Lynn(mm[Hg])    HR(bpm)    RR(rpm)    Temp    WT    HT    HC    BMI

                    BSA                 BMI Percentile      O2 Sat(%)

 

        2016    2:04:00 PM    142 mmHg    86 mmHg    68 bpm    16 rpm    98.5 F    166 lbs    63 in

                          29.41 kg/m2    1.83 m2                   100 %

 

        2016    11:27:00 AM    148 mmHg    78 mmHg    90 bpm    20 rpm    98.2 F    153 lbs    69 in

                          22.5939 kg/m    1.8381 m                 96 %

 

        12/3/2015    9:50:00 AM    132 mmHg    70 mmHg    62 bpm    16 rpm    97.9 F    145 lbs    69 in

                          21.41 kg/m2    1.79 m2                   100 %

 

        2015    8:52:00 AM    132 mmHg    68 mmHg    52 bpm    20 rpm    97.8 F    141 lbs    69 in

                          20.8218 kg/m    1.7645 m                 100 %

 

        2015    3:25:00 PM    120 mmHg    62 mmHg    72 bpm    16 rpm    98.1 F    136 lbs    69 in

                          20.08 kg/m2    1.73 m2                   98 %

 

       3/23/2015    2:55:00 PM    130 mmHg    76 mmHg    68 bpm    18 rpm    97 F    140 lbs    69 in    

                20.6742 kg/m    1.7583 m                      98 %

 

        10/16/2014    11:11:00 AM    120 mmHg    66 mmHg    77 bpm    20 rpm    98 F    130 lbs    69 in

                          19.20 kg/m2    1.69 m2                   100 %

 

        2014    3:21:00 PM    130 mmHg    66 mmHg    63 bpm    18 rpm    97.2 F    160 lbs    69 in

                          23.6276 kg/m    1.8797 m                 99 %

 

        2013    10:35:00 AM    132 mmHg    70 mmHg    66 bpm    20 rpm    98.1 F    157 lbs    69 in

                          23.18 kg/m2    1.86 m2                    

 

        2013    1:29:00 PM    132 mmHg    70 mmHg    76 bpm    18 rpm    98.2 F    166 lbs    69 in 

                          24.5137 kg/m    1.9146 m                  

 

       2013    2:46:00 PM    128 mmHg    70 mmHg    76 bpm    16 rpm    98 F    160 lbs    69 in     

                23.63 kg/m2     1.88 m2                          

 

        2011    8:49:00 AM    128 mmHg    78 mmHg    70 bpm    18 rpm    97.9 F    164 lbs    69 in

                          24.2183 kg/m    1.903 m                  

 

     2011    1:31:00 PM    132 mmHg    68 mmHg    84 bpm         97 F    167 lbs                        

                                         

 

        2011    9:09:00 AM    128 mmHg    70 mmHg    72 bpm    18 rpm    98.2 F    163 lbs    64 in 

                          27.9786 kg/m    1.8272 m                  

 

       2011    10:01:00 AM    132 mmHg    70 mmHg    72 bpm    18 rpm    98.2 F    154 lbs             

                                                                 

 

       2011    2:47:00 PM    128 mmHg    70 mmHg    72 bpm    18 rpm    97.8 F    156 lbs             

                                                                 

 

       5/10/2011    3:16:00 PM    144 mmHg    80 mmHg    72 bpm    18 rpm    98.2 F    158 lbs             

                                                                 

 

        2011    10:11:00 AM    132 mmHg    70 mmHg    70 bpm    18 rpm    98.2 F    168 lbs    69 in

                          24.809 kg/m    1.9261 m                  

 

        4/15/2011    10:52:00 AM    110 mmHg    60 mmHg    75 bpm    16 rpm    97.5 F    172.375 lbs    

69 in                     25.46 kg/m2    1.95 m2                   100 %

 

        2011    11:43:00 AM    120 mmHg    82 mmHg    75 bpm    16 rpm    97.2 F    178.5 lbs    69

 in                       26.3596 kg/m    1.9854 m                 100 %

 

        10/15/2010    1:32:00 PM    120 mmHg    70 mmHg    80 bpm    18 rpm    96.6 F    177 lbs    69 in

                          26.14 kg/m2    1.98 m2                   100 %

 

        2010    3:50:00 PM    168 mmHg    100 mmHg    82 bpm    18 rpm    97.8 F    177.5 lbs    69

 in                       26.2119 kg/m    1.9798 m                 97 %

 

        2010    1:21:00 PM    140 mmHg    80 mmHg    59 bpm    16 rpm    97.6 F    173.25 lbs    69 

in                        25.58 kg/m2    1.96 m2                   100 %

 

        2010    3:02:00 PM    140 mmHg    80 mmHg    61 bpm    16 rpm    97.6 F    173.125 lbs    69

 in                       25.5658 kg/m    1.9553 m                 99 %

 

        2010    1:23:00 PM    130 mmHg    80 mmHg    66 bpm    16 rpm    96.8 F    173 lbs    69 in 

                          25.55 kg/m2    1.95 m2                   100 %

 

        2010    12:58:00 PM    130 mmHg    88 mmHg    75 bpm    16 rpm    98.4 F    172.25 lbs    69

 in                       25.4366 kg/m    1.9503 m                 100 %







Social History







                    Name                Description         Comments

 

                    denies alcohol use                         

 

                    denies smoking                           

 

                    Denies illicit substance abuse                         

 

                    retired                                 direct care

 

                    Single                                   

 

                    Exercises regularly                         

 

                    Attended some college                         

 

                    Tobacco             Never smoker         







History of Procedures







                    Date Ordered        Description         Order Status

 

                    2010 12:00 AM    COMPREHEN METABOLIC PANEL    Reviewed

 

                    2010 12:00 AM    COMPLETE CBC W/AUTO DIFF WBC    Reviewed

 

                    2010 12:00 AM    LIPID PANEL         Reviewed

 

                          2015 12:00 AM        B12 Injection, Up to 1000 Mcg NDC#8634-3561-79 [facility] Medicare 

                                        Reviewed

 

                    2011 12:00 AM    MAMMOGRAM SCREENING    Reviewed

 

                    2011 12:00 AM    CYTOPATH C/V THIN LAYER    Reviewed

 

                    2011 12:00 AM    B12 Injection 1 cc NDC#[address]-60    Reviewed

 

                    2015 12:00 AM    THER/PROPH/DIAG INJ SC/IM    Reviewed

 

                    2015 12:00 AM    B12 Injection, Up to 1000 Mcg NDC#5001-4682-80    Reviewed

 

                    2011 12:00 AM    THER/PROPH/DIAG INJ SC/IM    Reviewed

 

                    2011 12:00 AM    B12 Injection([place]) Ndc#6111-6323-73-    Reviewed

 

                    2015 12:00 AM    THER/PROPH/DIAG INJ SC/IM    Reviewed

 

                    2015 12:00 AM    B12 Injection, Up to 1000 Mcg NDC#0771-6544-55    Reviewed

 

                    10/20/2011 12:00 AM    THER/PROPH/DIAG INJ SC/IM    Reviewed

 

                    10/20/2011 12:00 AM    B12 Injection([place]) Ndc#8139-9680-83-    Reviewed

 

                    2016 12:00 AM    THER/PROPH/DIAG INJ SC/IM    Reviewed

 

                    2016 12:00 AM    B12 Injection, Up to 1000 Mcg NDC#6059-1550-28    Reviewed

 

                    3/14/2016 12:00 AM    VITAMIN B-12        Reviewed

 

                    3/15/2016 12:00 AM    THER/PROPH/DIAG INJ SC/IM    Reviewed

 

                    3/15/2016 12:00 AM    B12 Injection, Up to 1000 Mcg NDC#7425-2021-55    Reviewed

 

                    2011 12:00 AM    ***Immunization administration, Medicare flu    Reviewed

 

                    2011 12:00 AM    Fluzone ** MEDICARE Only **    Reviewed

 

                    2011 12:00 AM    THER/PROPH/DIAG INJ SC/IM    Reviewed

 

                    2011 12:00 AM    B12 Injection ([employer] [facility]) Ndc#4216-0816-33    Reviewed

 

                    2016 12:00 AM    B12 Injection, Up to 1000 Mcg NDC#5563-3584-49 [facility] Medicare    

Reviewed

 

                    2016 12:00 AM    TTE W/DOPPLER COMPLETE    Reviewed

 

                    2016 12:00 AM    EXTREMITY STUDY     Returned

 

                          2016 12:00 AM        B12 Injection, Up to 1000 Mcg NDC#0824-8260-34 [facility] Medicare 

                                        Reviewed

 

                    2016 12:00 AM    THER/PROPH/DIAG INJ SC/IM    Reviewed

 

                    2016 12:00 AM    THER/PROPH/DIAG INJ SC/IM    Reviewed

 

                    2016 12:00 AM    B12 Injection, Up to 1000 Mcg NDC#2637-6542-46    Reviewed

 

                    2012 12:00 AM    THER/PROPH/DIAG INJ SC/IM    Reviewed

 

                    2012 12:00 AM    B12 Injection ([employer]) Ndc#0678-8491-55    Reviewed

 

                    2012 12:00 AM    THER/PROPH/DIAG INJ SC/IM    Reviewed

 

                    2012 12:00 AM    B12 Injection([place]) Ndc#1418-0292-74-    Reviewed

 

                    5/3/2012 12:00 AM    THER/PROPH/DIAG INJ SC/IM    Reviewed

 

                    5/3/2012 12:00 AM    B12 Injection([place]) Ndc#4867-6806-16-    Reviewed

 

                    2012 12:00 AM    IMMUNOTHERAPY INJECTIONS    Reviewed

 

                    2012 12:00 AM    B12 Injection([place]) Ndc#0618-5399-88-    Reviewed

 

                    2012 12:00 AM    THER/PROPH/DIAG INJ SC/IM    Reviewed

 

                    2012 12:00 AM    B12 Injection, Up to 1000 Mcg NDC#7558-2927-55    Reviewed

 

                    2012 12:00 AM    THER/PROPH/DIAG INJ SC/IM    Reviewed

 

                    2012 12:00 AM    B12 Injection, Up to 1000 Mcg NDC#0367-2821-44    Reviewed

 

                    2012 12:00 AM    THER/PROPH/DIAG INJ SC/IM    Reviewed

 

                    2012 12:00 AM    B12 Injection, Up to 1000 Mcg NDC#3379-9782-86    Reviewed

 

                    10/16/2012 12:00 AM    THER/PROPH/DIAG INJ SC/IM    Reviewed

 

                    10/16/2012 12:00 AM    B12 Injection, Up to 1000 Mcg NDC#0913-4453-94    Reviewed

 

                    2010 12:00 AM    COMPREHEN METABOLIC PANEL    Reviewed

 

                    2010 12:00 AM    COMPLETE CBC W/AUTO DIFF WBC    Reviewed

 

                    2010 12:00 AM    LIPID PANEL         Reviewed

 

                    2013 12:00 AM    Flu Injection 3 Years And Above NDC# [address]-16  [facility]    Reviewed



 

                    2013 12:00 AM    COMPLETE CBC W/AUTO DIFF WBC    Reviewed

 

                    2013 12:00 AM    ASSAY OF LITHIUM    Reviewed

 

                    2013 12:00 AM    METABOLIC PANEL TOTAL CA    Reviewed

 

                    4/3/2013 12:00 AM    THER/PROPH/DIAG INJ SC/IM    Reviewed

 

                    4/3/2013 12:00 AM    B12 Injection, Up to 1000 Mcg NDC#4514-4155-22    Reviewed

 

                    2013 12:00 AM    THER/PROPH/DIAG INJ SC/IM    Reviewed

 

                    2013 12:00 AM    B12 Injection, Up to 1000 Mcg NDC#4085-9368-26    Reviewed

 

                    2013 12:00 AM    THER/PROPH/DIAG INJ SC/IM    Reviewed

 

                    2013 12:00 AM    B12 Injection, Up to 1000 Mcg NDC#3513-7246-95    Reviewed

 

                    2013 12:00 AM    LIPID PANEL         Reviewed

 

                    2013 12:00 AM    VITAMIN D 25 HYDROXY    Reviewed

 

                    2013 12:00 AM    THER/PROPH/DIAG INJ SC/IM    Reviewed

 

                    3/6/2014 12:00 AM    THER/PROPH/DIAG INJ SC/IM    Reviewed

 

                    2014 12:00 AM    THER/PROPH/DIAG INJ SC/IM    Reviewed

 

                    2014 12:00 AM    B12 Injection, Up to 1000 Mcg NDC#6271-1223-80    Reviewed

 

                    2010 12:00 AM    SKIN FUNGI CULTURE    Reviewed

 

                    10/9/2010 12:00 AM    COMPREHEN METABOLIC PANEL    Reviewed

 

                    10/9/2010 12:00 AM    LIPID PANEL         Reviewed

 

                    2010 12:00 AM    THER/PROPH/DIAG INJ SC/IM    Reviewed

 

                    2010 12:00 AM    B12 Injection Ndc#[address]-40 ([place])    Reviewed

 

                    2010 12:00 AM    THER/PROPH/DIAG INJ SC/IM    Reviewed

 

                    2010 12:00 AM    Kenalog 40 Mg Im-Ndc#[address]-83 ([place])    Reviewed

 

                    10/15/2010 12:00 AM    FLU VACCINE 3 YRS & > IM    Reviewed

 

                    10/15/2010 12:00 AM    Admin.Of M/C Cov.Vaccine-Flu Vacc.    Reviewed

 

                    1/15/2011 12:00 AM    COMPLETE CBC W/AUTO DIFF WBC    Reviewed

 

                    1/15/2011 12:00 AM    COMPREHEN METABOLIC PANEL    Reviewed

 

                    1/15/2011 12:00 AM    LIPID PANEL         Reviewed

 

                    2014 12:00 AM    MAMMOGRAM SCREENING    Reviewed

 

                    2014 12:00 AM    Screening mammography, bilateral    Reviewed

 

                    7/10/2014 12:00 AM    THER/PROPH/DIAG INJ SC/IM    Reviewed

 

                    7/10/2014 12:00 AM    B12 Injection, Up to 1000 Mcg NDC#1178-5873-68    Reviewed

 

                    2011 12:00 AM    COMPLETE CBC W/AUTO DIFF WBC    Reviewed

 

                    2011 12:00 AM    COMPREHEN METABOLIC PANEL    Reviewed

 

                    2011 12:00 AM    LIPID PANEL         Reviewed

 

                    2014 12:00 AM    B12 Injection, Up to 1000 Mcg NDC#3391-4835-54    Reviewed

 

                    10/19/2014 12:00 AM    MAMMOGRAM SCREENING    Reviewed

 

                    10/19/2014 12:00 AM    Screening mammography, bilateral    Reviewed

 

                    10/16/2014 12:00 AM    COMPLETE CBC W/AUTO DIFF WBC    Reviewed

 

                    10/16/2014 12:00 AM    COMPREHEN METABOLIC PANEL    Reviewed

 

                    10/16/2014 12:00 AM    IMMUNOASSAY TUMOR     Reviewed

 

                    10/16/2014 12:00 AM    LIPID PANEL         Reviewed

 

                    10/16/2014 12:00 AM    ASSAY OF LITHIUM    Reviewed

 

                    10/16/2014 12:00 AM    MAMMOGRAM SCREENING    Reviewed

 

                    2011 12:00 AM    ASSAY OF PARATHORMONE    Reviewed

 

                    2011 12:00 AM    VITAMIN D 25 HYDROXY    Reviewed

 

                    2011 12:00 AM    ASSAY OF LITHIUM    Reviewed

 

                    2011 12:00 AM    METABOLIC PANEL TOTAL CA    Reviewed

 

                    2011 12:00 AM    CT HEAD/BRAIN W/O & W/DYE    Reviewed

 

                    3/23/2015 12:00 AM    PNEUMOCOCCAL VACC 13 GLENDY IM    Reviewed

 

                    3/23/2015 12:00 AM    Vitamin B12 injection    Reviewed

 

                    2011 12:00 AM    ASSAY OF LITHIUM    Reviewed

 

                    2011 12:00 AM    B12 Injection Ndc#[address]-50  [facility]    Reviewed

 

                    2015 12:00 AM    THER/PROPH/DIAG INJ SC/IM    Reviewed

 

                    2015 12:00 AM    B12 Injection, Up to 1000 Mcg NDC#3307-6762-13    Reviewed

 

                    2015 12:00 AM    COMPLETE CBC W/AUTO DIFF WBC    Reviewed

 

                    2015 12:00 AM    COMPREHEN METABOLIC PANEL    Reviewed

 

                    2015 12:00 AM    LIPID PANEL         Reviewed

 

                    2015 12:00 AM    ASSAY OF LITHIUM    Reviewed

 

                    2011 12:00 AM    VIT D 1 25-DIHYDROXY    Reviewed

 

                    2011 12:00 AM    VITAMIN B-12        Reviewed

 

                    2015 12:00 AM    B12 Injection, Up to 1000 Mcg NDC#2362-7121-16    Reviewed

 

                    2015 12:00 AM    THER/PROPH/DIAG INJ SC/IM    Reviewed

 

                    2015 12:00 AM    B12 Injection, Up to 1000 Mcg NDC#3377-6758-01    Reviewed

 

                    2011 12:00 AM    THER/PROPH/DIAG INJ SC/IM    Reviewed

 

                    2011 12:00 AM    B12 Injection ([facility]) Ndc#2417-6576-45    Reviewed

 

                    2015 12:00 AM    THER/PROPH/DIAG INJ SC/IM    Reviewed

 

                    2015 12:00 AM    B12 Injection, Up to 1000 Mcg NDC#1388-2725-58    Reviewed







Results Summary







                          Data and Description      Results

 

                          2004 12:00 AM        Colonoscopy-Women and Men over 50 Normal 

 

                          2008 12:00 AM         Pap Smear Declined 

 

                          10/7/2009 12:00 AM        Cholest Cry Stone Ql .0 %LDLc SerPl-mCnc 123.0 mg/dLHDLc

 SerPl-mCnc 34.0 mg/dLTrigl SerPl-mCnc 190.0 mg/dLGlucose SerPl-mCnc 78.0 mg/dL

 

                          2009 12:00 AM        Mammogram -Women over 40 Normal HIV1+2 Ab Ser Ql no risk 

 

                          2010 8:47 AM         Dexa Bone Scan Refused Aspirin reccommended Contraindication 



 

                          2010 8:48 AM         Depression Done 

 

                          2010 12:00 AM         Foot Exam-Diabetic Done 

 

                          2010 12:00 AM         Cholest Cry Stone Ql .0 %LDLc SerPl-mCnc 126.0 mg/dLGlucose

 SerPl-mCnc 102.0 mg/dL

 

                          2010 8:45 AM          TRIGLYCERIDES 122.0 mg/dLCHOLESTEROL 186.0 mg/dLHDL 36.0 mg/dLLDL

 (CALC) 126.0 mg/dLGLUCOSE 102.0 mg/dLSODIUM 143.0 mmol/LPOTASSIUM 3.70 
mmol/LCHLORIDE 111.0 mmol/LCO2 23.0 mmol/LBUN 10.0 mg/dLCREATININE 0.80 
mg/dLSGOT/AST 12.0 IU/LSGPT/ALT 11.0 IU/LALK PHOS 65.0 IU/LTOTAL PROTEIN 7.20 
g/dLALBUMIN 3.90 g/dLTOTAL BILI 0.50 mg/dLCALCIUM 10.20 mg/dLeGFR >60 
mL/min/1.73 m2WBC 5.7 RBC 3.26 HGB 10.60 g/dLHCT 31.70 %MCV 97.0 fLMCH 32.50 
pgMCHC 33.40 g/dLRDW CV 13.30 %MPV 9.70 fLPLT 287 %NEUT 62.90 %%LYMP 21.80 
%%MONO 9.90 %%EOS 5.0 %%BASO 0.40 %#NEUT 3.56 #LYMP 1.23 #MONO 0.56 #EOS 0.28 
#BASO 0.02 

 

                          2010 12:00 AM        Glucose SerPl-mCnc 96.0 mg/dLCholest Cry Stone Ql .0 %LDLc

 SerPl-mCnc 146.0 mg/dL

 

                          2010 8:26 AM         TRIGLYCERIDES 106.0 mg/dLCHOLESTEROL 199.0 mg/dLHDL 32.0 mg/dLLDL

 (CALC) 146.0 mg/dLGLUCOSE 96.0 mg/dLSODIUM 143.0 mmol/LPOTASSIUM 4.0 
mmol/LCHLORIDE 113.0 mmol/LCO2 24.0 mmol/LBUN 13.0 mg/dLCREATININE 1.0 
mg/dLSGOT/AST 11.0 IU/LSGPT/ALT 6.0 IU/LALK PHOS 56.0 IU/LTOTAL PROTEIN 6.60 
g/dLALBUMIN 3.80 g/dLTOTAL BILI 0.50 mg/dLCALCIUM 9.30 mg/dLeGFR 57 

 

                          10/6/2010 12:00 AM        Cholest Cry Stone Ql .0 %LDLc SerPl-mCnc 111.0 mg/dLGlucose

 SerPl-mCnc 81.0 mg/dL

 

                          10/6/2010 2:45 PM         TRIGLYCERIDES 123.0 mg/dLCHOLESTEROL 178.0 mg/dLHDL 42.0 mg/dLLDL

 (CALC) 111.0 mg/dLGLUCOSE 81.0 mg/dLSODIUM 139.0 mmol/LPOTASSIUM 4.10 
mmol/LCHLORIDE 106.0 mmol/LCO2 24.0 mmol/LBUN 13.0 mg/dLCREATININE 0.90 
mg/dLSGOT/AST 13.0 IU/LSGPT/ALT 11.0 IU/LALK PHOS 61.0 IU/LTOTAL PROTEIN 7.10 
g/dLALBUMIN 3.90 g/dLTOTAL BILI 0.30 mg/dLCALCIUM 9.30 mg/dLeGFR >60 mL/min/1.73
 m2WBC 6.9 RBC 3.59 HGB 11.50 g/dLHCT 35.30 %MCV 98.0 fLMCH 32.0 pgMCHC 32.60 
g/dLRDW CV 12.90 %MPV 9.90 fLPLT 311 %NEUT 64.90 %%LYMP 22.50 %%MONO 7.20 %%EOS 
5.10 %%BASO 0.30 %#NEUT 4.45 #LYMP 1.54 #MONO 0.49 #EOS 0.35 #BASO 0.02 

 

                          2011 12:00 AM         Mammogram -Women over 40 Ordered 

 

                          2011 10:25 AM        TRIGLYCERIDES 111.0 mg/dLCHOLESTEROL 195.0 mg/dLHDL 43.0 mg/dLLDL

 (CALC) 130.0 mg/dLWBC 5.3 RBC 3.76 HGB 12.0 g/dLHCT 37.80 %.0 fLMCH 
31.90 pgMCHC 31.70 g/dLRDW CV 13.0 %MPV 9.70 fLPLT 259 %NEUT 69.0 %%LYMP 17.60 
%%MONO 8.30 %%EOS 4.70 %%BASO 0.40 %#NEUT 3.63 #LYMP 0.93 #MONO 0.44 #EOS 0.25 
#BASO 0.02 GLUCOSE 102.0 mg/dLSODIUM 146.0 mmol/LPOTASSIUM 4.20 mmol/LCHLORIDE 
113.0 mmol/LCO2 23.0 mmol/LBUN 15.0 mg/dLCREATININE 1.0 mg/dLSGOT/AST 12.0 
IU/LSGPT/ALT 17.0 IU/LALK PHOS 60.0 IU/LTOTAL PROTEIN 6.90 g/dLALBUMIN 4.20 
g/dLTOTAL BILI 0.40 mg/dLCALCIUM 9.70 mg/dLeGFR 57 

 

                          2011 11:49 AM        Cholest Cry Stone Ql .0 %LDLc SerPl-mCnc 130.0 mg/dLHDLc

 SerPl-mCnc 43.0 mg/dLTrigl SerPl-mCnc 111.0 mg/dLGlucose SerPl-mCnc 102.0 mg/dL

 

                          2011 11:52 AM        Pap Smear Declined 

 

                          2011 11:28 AM        Lithium 2.080 mmol/LGLUCOSE 102.0 mg/dLSODIUM 135.0 mmol/LPOTASSIUM

 3.90 mmol/LCHLORIDE 106.0 mmol/LCO2 21.0 mmol/LBUN 12.0 mg/dLCREATININE 1.30 
mg/dLCALCIUM 10.70 mg/dLeGFR 42 

 

                          2011 8:58 AM          Lithium 0.690 mmol/L

 

                          2011 2:38 PM         VITAMIN B12 3483.0 pg/mL

 

                          2013 3:35 PM          WBC 5.1 RBC 3.73 HGB 11.70 g/dLHCT 36.40 %MCV 98.0 fLMCH 31.40

 pgMCHC 32.10 g/dLRDW CV 13.10 %MPV 9.80 fLPLT 224 %NEUT 66.80 %%LYMP 19.10 
%%MONO 9.0 %%EOS 4.90 %%BASO 0.20 %#NEUT 3.42 #LYMP 0.98 #MONO 0.46 #EOS 0.25 
#BASO 0.01 GLUCOSE 88.0 mg/dLSODIUM 141.0 mmol/LPOTASSIUM 4.10 mmol/LCHLORIDE 
110.0 mmol/LCO2 22.0 mmol/LBUN 22.0 mg/dLCREATININE 1.10 mg/dLCALCIUM 9.80 
mg/dLeGFR 50 Lithium 0.760 mmol/L

 

                          2013 11:02 AM        TRIGLYCERIDES 106.0 mg/dLCHOLESTEROL 181.0 mg/dLHDL 46.0 mg/dLLDL

 (CALC) 114.0 mg/dLVITAMIN D 41.10 ng/mL

 

                          10/17/2014 10:10 AM       WBC 5.0 RBC 3.66 HGB 11.60 g/dLHCT 36.80 %.0 fLMCH 31.70

 pgMCHC 31.50 g/dLRDW CV 13.50 %MPV 10.10 fLPLT 209 %NEUT 69.20 %%LYMP 21.0 
%%MONO 6.40 %%EOS 3.20 %%BASO 0.20 %#NEUT 3.46 #LYMP 1.05 #MONO 0.32 #EOS 0.16 
#BASO 0.01 GLUCOSE 100.0 mg/dLSODIUM 148.0 mmol/LPOTASSIUM 3.90 mmol/LCHLORIDE 
114.0 mmol/LCO2 26.0 mmol/LBUN 12.0 mg/dLCREATININE 1.20 mg/dLSGOT/AST 9.0 
IU/LSGPT/ALT <6 IU/LALK PHOS 82.0 IU/LTOTAL PROTEIN 6.90 g/dLALBUMIN 4.0 
g/dLTOTAL BILI 0.40 mg/dLCALCIUM 10.50 mg/dLeGFR 45 TRIGLYCERIDES 96.0 
mg/dLCHOLESTEROL 155.0 mg/dLHDL 38.0 mg/dLLDL (CALC) 98.0 mg/dLLithium 0.850 
mmol/LCancer Antigen (CA) 125 8.30 U/mL

 

                          2015 10:25 AM        Lithium 0.790 mmol/LWBC 4.8 RBC 3.44 HGB 11.0 g/dLHCT 35.20 

%.0 fLMCH 32.0 pgMCHC 31.30 g/dLRDW CV 14.0 %MPV 9.30 fLPLT 210 %NEUT 
70.80 %%LYMP 17.20 %%MONO 8.10 %%EOS 3.50 %%BASO 0.40 %#NEUT 3.41 #LYMP 0.83 
#MONO 0.39 #EOS 0.17 #BASO 0.02 TRIGLYCERIDES 107.0 mg/dLCHOLESTEROL 174.0 
mg/dLHDL 43.0 mg/dLLDL (CALC) 110.0 mg/dLGLUCOSE 90.0 mg/dLSODIUM 145.0 
mmol/LPOTASSIUM 3.80 mmol/LCHLORIDE 115.0 mmol/LCO2 24.0 mmol/LBUN 17.0 mg
/dLCREATININE 1.30 mg/dLSGOT/AST 18.0 IU/LSGPT/ALT 17.0 IU/LALK PHOS 56.0 
IU/LTOTAL PROTEIN 6.70 g/dLALBUMIN 3.90 g/dLTOTAL BILI 0.40 mg/dLCALCIUM 9.80 
mg/dLeGFR 41 

 

                          2015 8:50 AM        WBC 5.8 RBC 3.29 HGB 10.70 g/dLHCT 34.0 %.0 fLMCH 32.50

 pgMCHC 31.50 g/dLRDW CV 13.60 %MPV 9.60 fLPLT 223 %NEUT 69.60 %%LYMP 18.90 
%%MONO 8.50 %%EOS 2.80 %%BASO 0.20 %#NEUT 4.03 #LYMP 1.09 #MONO 0.49 #EOS 0.16 
#BASO 0.01 Lithium 0.620 mmol/LGLUCOSE 83.0 mg/dLSODIUM 139.0 mmol/LPOTASSIUM 
3.90 mmol/LCHLORIDE 109.0 mmol/LCO2 22.0 mmol/LBUN 19.0 mg/dLCREATININE 1.40 
mg/dLSGOT/AST 19.0 IU/LSGPT/ALT 21.0 IU/LALK PHOS 55.0 IU/LTOTAL PROTEIN 6.50 
g/dLALBUMIN 3.90 g/dLTOTAL BILI 0.50 mg/dLCALCIUM 9.60 mg/dLeGFR 38 
TRIGLYCERIDES 121.0 mg/dLCHOLESTEROL 192.0 mg/dLHDL 51.0 mg/dLLDL 121.0 mg/dLTSH
 1.210 uIU/mLHemoglobin A1c 5.40 %

 

                          3/15/2016 8:08 AM         VITAMIN B12 696.0 pg/mL

 

                          3/23/2016 8:26 AM         WBC 7.0 RBC 3.61 HGB 11.80 g/dLHCT 37.70 %.0 fLMCH 32.70

 pgMCHC 31.30 g/dLRDW CV 12.50 %MPV 10.0 fLPLT 207 %NEUT 73.60 %%LYMP 16.40 
%%MONO 6.60 %%EOS 3.0 %%BASO 0.30 %#NEUT 5.15 #LYMP 1.15 #MONO 0.46 #EOS 0.21 
#BASO 0.02 Lithium 0.940 mmol/LGLUCOSE 108.0 mg/dLSODIUM 143.0 mmol/LPOTASSIUM 
4.30 mmol/LCHLORIDE 110.0 mmol/LCO2 27.0 mmol/LBUN 16.0 mg/dLCREATININE 1.60 
mg/dLSGOT/AST 13.0 IU/LSGPT/ALT 7.0 IU/LALK PHOS 71.0 IU/LTOTAL PROTEIN 6.80 
g/dLALBUMIN 4.0 g/dLTOTAL BILI 0.20 mg/dLCALCIUM 10.40 mg/dLeGFR 32 
TRIGLYCERIDES 113.0 mg/dLCHOLESTEROL 169.0 mg/dLHDL 42.0 mg/dLLDL (CALC) 104.0 
mg/dLTSH 2.20 uIU/mLHemoglobin A1c 5.20 %

 

                          3/25/2016 9:17 AM         COLOR YELLOW APPEARANCE CLEAR SPEC GRAV 1.010 pH 7.0 PROTEIN 

NEGATIVE GLUCOSE NEGATIVE mg/dLKETONE NEGATIVE BILIRUBIN NEGATIVE BLOOD NEGATIVE
 NITRITE NEGATIVE LEUK SCREEN SMALL CASTS/LPF NEGATIVE /LPFCRYSTALS NEGATIVE 
MUCOUS THRDS NEGATIVE BACTERIA NEGATIVE EPITH CELLS FEW SQUAMOUS /HPFTRICHOMONAS
 NEGATIVE YEAST NEGATIVE 







History Of Immunizations







       Name    Date Admin    Mfg Name    Mfg Code    Trade Name    Lot#    Route    Inj    Vis Given    Vis

 Pub                                    CVX

 

        Influenza    2008    Not Entered    NE      Not Entered            Not Entered    Not Entered

                    1            999

 

        X       12/19/2008    [employer]    MSD     Pneumovax 23            Intramuscular    Not Entered

                    1            999

 

           Influenza    10/15/2010    [employer]    NOV        Fluvirin > 12 Years    

[id number]     Intramuscular    Left Deltoid    10/15/2010    2009    999

 

          X         3/23/2015    [facility]    [facility]       Prevnar 13    [id number]    Intramuscular

                Right Gluteous Medius    3/23/2015       2013       109







History of Past Illness







                    Name                Date of Onset       Comments

 

                    Peritoneal Neoplasm, Malignant                         

 

                    Hyperlipidemia                           

 

                    Bipolar disorder, unspecified                         

 

                    Artificial opening status; colostomy                         

 

                    B12 deficiency                           

 

                    Anemia, Pernicious                         

 

                    Arthritis unspecified                         

 

                    cervical cancer                          

 

                    Artificial opening status; colostomy    2010  1:10PM     

 

                    Bipolar disorder, unspecified    2010  1:10PM     

 

                    Hyperlipidemia      2010  1:10PM     

 

                    Anemia, Pernicious    2010  1:10PM     

 

                    Postoperative Follow-Up    2010  1:55PM     

 

                    Postoperative Follow-Up    Mar  8 2010 10:57AM     

 

                    Artificial opening status; colostomy    Mar  8 2010  1:19PM     

 

                    Peritoneal Neoplasm, Malignant    Mar  8 2010  1:19PM     

 

                    Artificial opening status; colostomy    2010  1:40PM     

 

                    Hyperlipidemia      2010  1:40PM     

 

                    Anemia, Pernicious    2010  1:40PM     

 

                    Peritoneal Neoplasm, Malignant    2010  1:40PM     

 

                    Arthritis unspecified    2010  1:40PM     

 

                    Anemia of Chronic Illness    2010  1:40PM     

 

                    Tinea corporis      2010  3:17PM     

 

                    Bipolar disorder, unspecified    2010  1:33PM     

 

                    Hyperlipidemia      2010  1:33PM     

 

                    Anemia, Pernicious    2010  1:33PM     

 

                    Peritoneal Neoplasm, Malignant    2010  1:33PM     

 

                    B12 deficiency      2010  1:33PM     

 

                    Ethmoidal Sinusitis, Acute    Sep 21 2010  3:53PM     

 

                    Wheezing            Sep 21 2010  3:53PM     

 

                    Flu                 Oct 15 2010  1:40PM     

 

                    Bipolar disorder, unspecified    Oct 15 2010  1:42PM     

 

                    Hyperlipidemia      Oct 15 2010  1:42PM     

 

                    Anemia, Pernicious    Oct 15 2010  1:42PM     

 

                    Peritoneal Neoplasm, Malignant    Oct 15 2010  1:42PM     

 

                    Bipolar disorder, unspecified    2011 12:01PM     

 

                    Hyperlipidemia      2011 12:01PM     

 

                    Anemia, Pernicious    2011 12:01PM     

 

                    Peritoneal Neoplasm, Malignant    2011 12:01PM     

 

                    Bipolar disorder, unspecified    Apr 15 2011 10:55AM     

 

                    Major Depression    2011 10:11AM     

 

                    Bipolar Disorder    2011 10:11AM     

 

                    Cancer              May 10 2011  4:16PM     

 

                    Major Depression    May 10 2011  3:16PM     

 

                    Bipolar Disorder    May 10 2011  3:16PM     

 

                    Hypercalcemia       May 23 2011  2:47PM     

 

                    Bipolar disorder, unspecified    May 23 2011  2:47PM     

 

                    Colon Cancer, Personal History    May 23 2011  2:47PM     

 

                    Bipolar Disorder    May 31 2011  4:39PM     

 

                    Depressive Disorder    2011 10:01AM     

 

                    Vitamin B12 deficiency    2011 10:01AM     

 

                    Vitamin D Deficiency    2011  5:07PM     

 

                    Anemia, Vitamin B12 Deficiency    2011  5:07PM     

 

                    B12 deficiency      2011  3:56PM     

 

                    Routine gynecological examination    Aug  4 2011  9:08AM     

 

                    Screening Examination for Breast Cancer    Aug  4 2011  9:08AM     

 

                    Tinea Corporis      Aug  4 2011  9:08AM     

 

                    Depressive Disorder    Sep 23 2011  8:47AM     

 

                    Contact Dermatitis    Sep 23 2011  8:47AM     

 

                    Anemia, Pernicious    Sep 23 2011  8:47AM     

 

                    B12 deficiency      Sep 23 2011  8:47AM     

 

                    B12 deficiency      Sep 27 2011  2:58PM     

 

                    B12 deficiency      Oct 20 2011  2:34PM     

 

                    Flu                 Dec  9 2011  3:16PM     

 

                    B12 deficiency      Dec  9 2011  3:17PM     

 

                    B12 deficiency      2012  4:52PM     

 

                    B12 deficiency      2012 11:10AM     

 

                    B12 deficiency      2012  3:37PM     

 

                    B12 deficiency      May  3 2012  4:10PM     

 

                    B12 deficiency      2012  2:54PM     

 

                    B12 deficiency      2012 11:23AM     

 

                    B12 deficiency      Aug  9 2012  2:08PM     

 

                    B12 deficiency      Sep  6 2012  4:36PM     

 

                    B12 deficiency      Oct 16 2012 10:23AM     

 

                    Flu                 Feb  4   3:11PM     

 

                    Bipolar disorder, unspecified    Feb  4   2:48PM     

 

                    Anemia, Pernicious    Feb  2013  2:48PM     

 

                    B12 deficiency      Feb  4   2:48PM     

 

                    Extrapyramidal abnormal movement disorder    Feb  4   2:48PM     

 

                    B12 deficiency      Apr  3 2013 12:03PM     

 

                    Bipolar disorder, unspecified    May  7 2013  1:31PM     

 

                    Anemia, Pernicious    May  7 2013  1:31PM     

 

                    B12 deficiency      May  7 2013  1:31PM     

 

                    Extrapyramidal abnormal movement disorder    May  7 2013  1:31PM     

 

                    B12 deficiency      2013  3:42PM     

 

                    B12 deficiency      2013  1:31PM     

 

                    Hyperlipidemia      Aug  7 2013 10:37AM     

 

                    Vitamin D Deficiency    Aug  7 2013 10:37AM     

 

                    Bipolar disorder, unspecified    Aug  7 2013 10:37AM     

 

                    Anemia, Pernicious    Aug  7 2013 10:37AM     

 

                    B12 deficiency      Aug  7 2013 10:37AM     

 

                    B12 deficiency      Sep 25 2013 11:15AM     

 

                    B12 deficiency      Dec 11 2013  3:16PM     

 

                    B12 deficiency      Mar  6 2014  1:48PM     

 

                    B12 deficiency      May 21 2014  3:17PM     

 

                    Screening Examination for Breast Cancer    2014  3:23PM     

 

                    Periumbilical abdominal pain    2014  3:23PM     

 

                    B12 deficiency      Jul 10 2014  2:52PM     

 

                    Anemia, Vitamin B12 Deficiency    Aug 13 2014  4:50PM     

 

                    Bipolar disorder    Oct 16 2014 11:13AM     

 

                    Hyperlipidemia      Oct 16 2014 11:13AM     

 

                    Anemia, Pernicious    Oct 16 2014 11:13AM     

 

                    Peritoneal Neoplasm, Malignant    Oct 16 2014 11:13AM     

 

                    Screening breast examination    Oct 16 2014 11:13AM     

 

                    Weight loss         Oct 16 2014 11:13AM     

 

                    Anemia, Pernicious    Mar 23 2015  2:57PM     

 

                    B12 deficiency      Mar 23 2015  2:57PM     

 

                    Need for Prevnar vaccine    Mar 23 2015  2:57PM     

 

                    Bipolar disorder    Mar 23 2015  2:57PM     

 

                    Hyperlipidemia      Mar 23 2015  2:57PM     

 

                    Anemia, Pernicious    Mar 23 2015  2:57PM     

 

                    Peritoneal Neoplasm, Malignant    Mar 23 2015  2:57PM     

 

                    B12 deficiency      May  4 2015  4:48PM     

 

                    Hyperlipidemia      May 13 2015  9:56AM     

 

                    Anemia              May 13 2015  9:56AM     

 

                    Bipolar disorder    May 13 2015  9:56AM     

 

                    Bipolar disorder    May 14 2015  3:27PM     

 

                    Hyperlipidemia      May 14 2015  3:27PM     

 

                    Anemia, Pernicious    May 14 2015  3:27PM     

 

                    Peritoneal Neoplasm, Malignant    May 14 2015  3:27PM     

 

                    B12 deficiency      2015  2:20PM     

 

                    B12 deficiency      2015 11:34AM     

 

                    B12 deficiency      Aug 18 2015  9:06AM     

 

                    Tinea Corporis      Sep 18 2015  8:54AM     

 

                    B12 deficiency      Sep 18 2015  8:54AM     

 

                    B12 deficiency      2015 10:28AM     

 

                    Herpes zoster without complication    Dec  3 2015  9:52AM     

 

                    B12 deficiency      Dec 23 2015 11:21AM     

 

                    B12 deficiency      2016  4:51PM     

 

                    Vitamin B 12 deficiency    Mar 14 2016  5:35PM     

 

                    B12 deficiency      Mar 15 2016 12:14PM     

 

                    B12 deficiency      May  5 2016 11:30AM     

 

                    Edema               May  5 2016 11:30AM     

 

                    Dermatitis          May  5 2016 11:30AM     

 

                    Edema               May 17 2016  8:38AM     

 

                    Shortness of breath    May 17 2016  8:38AM     

 

                    Bilateral edema of lower extremity    2016  2:06PM     

 

                    B12 deficiency      2016  2:06PM     

 

                    B12 deficiency      2016 11:50AM     

 

                    B12 deficiency      2016 11:20AM     







Payers







           Insurance Name    Company Name    Plan Name    Plan Number    Policy Number    Policy Group

 Number                                 Start Date

 

                    Medicare Part A    Medicare RHC              [id number]              N/A

 

                          [employer]                 [id number]

                                                    

 

                    Medicare Part A    Medicare - Lab/Xray              [id number]              2006

 

                    Medicare Part B    Medicare [place]              [id number]              2006

 

                          [employer]    [employer]                 50 percent

                                                    2009

 

                    Human    [employer]              [id number]              N/A

 

                    Medicare Part A    Medicare Part A              [id number]              N/A

 

                    Medicare Part A    Medicare Part A              [id number]              2006









History of Encounters







                    Visit Date          Visit Type          Provider

 

                    2016           Nurse visit         Bhupinder Louise DO

 

                    2016           Office visit        Bret GREEN

 

                    2016            Office visit        Bhupinder Louise DO

 

                    3/15/2016           Nurse visit         Bhupinder Louise DO

 

                    2016            Nurse visit         Bhupinder Louise DO

 

                    2015          Nurse visit         Bhupinder Louise DO

 

                    12/3/2015           Office visit        Bhupinder Louise DO

 

                    2015          Nurse visit         Bhupinder Louise DO

 

                    2015           Office visit        Bhupinder Louise DO

 

                    2015           Nurse visit         Bhupinder Louise DO

 

                    2015            Nurse visit         Bhupinder Louise DO

 

                    2015            Nurse visit         Bhupinder Louise DO

 

                    2015           Office visit        Bhpuinder Aspen DO

 

                    2015            Nurse visit         Bhupinder Aspen DO

 

                    3/23/2015           Office visit        Bhupinder Aspen DO

 

                    10/16/2014          Office visit        Bhupinder Aspen DO

 

                    2014           Nurse visit         Radha Weston APRN

 

                    7/10/2014           Nurse visit         Bhupinder Aspen DO

 

                    2014           Office visit        Bhupinder Aspen DO

 

                    2014           Nurse visit         Bhupinder Aspen DO

 

                    3/6/2014            Nurse visit         Bhupinder Aspen DO

 

                    2014            [facility]            EARNEST Lopez MD

 

                    2013          Nurse visit         Bhupinder Aspen DO

 

                    2013           Nurse visit         Bhupinder Aspen DO

 

                    2013            Office visit        Bhupinder Aspen DO

 

                    2013            Nurse visit         Bhupinder Aspen DO

 

                    2013            Nurse visit         Bhupinder Aspen DO

 

                    2013            Office visit        Bhupinder Aspen DO

 

                    4/3/2013            Nurse visit         Bhupinder Aspen DO

 

                    2013            Office visit        Bhupinder Aspen DO

 

                    10/16/2012          Nurse visit         Bhupinder Aspen DO

 

                    2012            Nurse visit         Bhupinder Aspen DO

 

                    2012            Voided              Bhupinder Aspen DO

 

                    2012            Nurse visit         Bhupinder Aspen DO

 

                    2012            Nurse visit         Bhupinder Aspen DO

 

                    2012           Nurse visit         Bhupinder Aspen DO

 

                    5/3/2012            Nurse visit         Bhupinder Aspen DO

 

                    2012           Nurse visit         Bhupinder Aspen DO

 

                    2012           Nurse visit         Bhupinder Aspen DO

 

                    2012           Nurse visit         Bhupinder Aspen DO

 

                    2011           Nurse visit         Bhupinder Aspen DO

 

                    10/20/2011          Nurse visit         Bhupinder Aspen DO

 

                    2011           Office visit        Bhupinder Aspen DO

 

                    2011           Nurse visit         Radha Ontiveros APRN

 

                    2011            Office visit        Bhupinder Aspen DO

 

                    2011           Nurse visit         Bhupinder Aspen DO

 

                    2011            Office visit        Bhupinder Aspen DO

 

                    2011           Office visit        Bhupinder Aspen DO

 

                    5/10/2011           Office visit        Bhupinder Aspen DO

 

                    2011           Office visit        Bhupinder Aspen DO

 

                    4/15/2011           Office visit        Devin Angel DO

 

                    2011           Office visit        Devin Angel DO

 

                    10/15/2010          Office visit        Devin Angel DO

 

                    2010           Office visit        Devin Angel DO

 

                    2010            Office visit        Devin Angel DO

 

                    2010           Office visit        Devin Angel DO

 

                    2010            Office visit        Devin Angel DO

 

                    3/8/2010            Office visit        Devin Masterson MD

 

                    2010            Surgery             Devin Masterson MD

 

                    2010            Office visit        Devin Angel DO

 

                    2010           Surgery             Devin Masterson MD

 

                    2010           Hospital            Devin Masterson MD

 

                    2010           [facility]            Devin Masterson MD

 

                    10/22/2009          Office visit        Devin Angel DO

## 2019-06-26 NOTE — XMS REPORT
MU2 Ambulatory Summary

                             Created on: 2016



Pauline Gan

External Reference #: [id number]

: 1950

Sex: Female



Demographics







                          Address                   [address]

[place]  [id number]

 

                          Home Phone                [phone]

 

                          Preferred Language        English

 

                          Marital Status            Legally 

 

                          Zoroastrian Affiliation     Unknown

 

                          Race                      White

 

                          Ethnic Group              Not  or 





Author







                          Bret Hernandez

 

                          [facility]

 

                          Address                   [address]

[place]  [id number]



 

                          Phone                     [phone]







Care Team Providers







                    Care Team Member Name    Role                Phone

 

                    Bret Forte    PCP                 [phone]

 

                    Bhupinder Louise    PreferredProvider    Unavailable







Allergies and Adverse Reactions







                    Name                Reaction            Notes

 

                    NO KNOWN DRUG ALLERGIES                         







Plan of Treatment







             Planned Activity    Comments     Planned Date    Planned Time    Plan/Goal

 

             Injection, Subcutaneous/IM                 2016    12:00 AM      

 

             Injection, Subcutaneous/IM                 2016    12:00 AM      

 

             Mammography; bilateral                 2016    12:00 AM      

 

             Injection, Subcutaneous/IM                 2016    12:00 AM      

 

             CBC with Auto                 2013     12:00 AM      

 

             Lithium                   2013     12:00 AM      

 

             Basic metabolic panel                 2013     12:00 AM      

 

             Vitamin B12 (cyanocobalamin)                 2013     12:00 AM      

 

             Folic acid, serum                 2013     12:00 AM      

 

             Injection,Subcutaneous/Intramuscul                 2013    12:00 AM      







Medications







                                        Active 

 

             Name         Start Date    Estimated Completion Date    SIG          Comments

 

                Latuda 20 mg oral tablet                                    take 1 tablet (20 mg) by oral route once daily with

 food (at least 350 calories)            

 

             pravastatin 40 mg oral tablet    3/30/2015                 TAKE 1 TABLET BY MOUTH DAILY     

 

                Namenda XR 28 mg oral capsule,sprinkle,ER 24hr    2015                       take 1 capsule (28

 mg) by oral route once daily            

 

                Namenda XR 28 mg oral capsule,sprinkle,ER 24hr    2016                       take 1 capsule (28

 mg) by oral route once daily            

 

                triamcinolone acetonide 0.1 % topical cream    2016                        apply a thin layer to 

the affected area(s) by topical route 2 times per day     

 

                potassium chloride 10 mEq oral tablet extended release    2016                       take 1 tablet

 (10 meq) by oral route once daily       

 

             pravastatin 40 mg oral tablet    2016                 TAKE 1 TABLET BY MOUTH DAILY     

 

                Vitamin B-12 1,000 mcg/mL injection solution    2016                       inject 1 milliliter 

(1,000 mcg) by intramuscular route once a month     









                                         

 

             Name         Start Date    Expiration Date    SIG          Comments

 

             Reglan 10mg    3/29/2010    2010    one ac and hs     

 

                Keflex 500 mg oral capsule    2010       10/1/2010       take 1 capsule (500 mg) by oral

 route every 6 hours for 10 days         

 

                Bactrim -160 mg oral tablet    2011       take 1 tablet by oral route

 every 12 hours for 7 days               

 

                triamcinolone acetonide 0.1 % topical cream    2011      apply a thin

 layer to the affected area(s) by topical route 2 times per day     

 

                sertraline 100 mg oral tablet    4/10/2012       5/10/2012       take 1.5 tablets by oral route

 daily for 30 days                       

 

                ergocalciferol (vitamin D2) 50,000 unit oral capsule    4/15/2013       2013       TAKE

 ONE CAPSULE BY MOUTH ONCE A WEEK        

 

                CYANOCOBALAM 1000MCGINJ 1000 milliliter    2013       INJECT 1ML INTRAMUSCULAR

 ONCE A MONTH                            

 

                pravastatin 40 mg oral tablet    3/25/2014       3/20/2015       TAKE ONE TABLET BY MOUTH EVERY

 DAY                                     

 

                          Zostavax (PF) 19,400 unit/0.65 mL subcutaneous suspension for reconstitution    3/23/2015

                    3/24/2015           inject 0.65 milliliter by subcutaneous route once     

 

                famciclovir 500 mg oral tablet    12/3/2015       12/10/2015      take 1 tablet (500 mg) by

 oral route every 8 hours for 7 days     

 

                furosemide 40 mg oral tablet    2016      take 1 tablet (40 mg) by oral

 route once daily                        

 

                Cipro 500 mg oral tablet    2016       take 1 tablet (500 mg) by oral route

 2 times per day for 5 days              









                                        Discontinued 

 

             Name         Start Date    Discontinued Date    SIG          Comments

 

                Tylenol 325 mg oral tablet                    2013        take 1 - 2 tablets (325 -650 mg) by oral

 route every 4-6 hours as needed         

 

                Calcium 600 + D(3) 600 mg(1,500mg) -400 unit oral tablet                    2011       take 1 tablet

 by oral route 2 times a day            no longer taking

 

                Vitamin B-12 1,000 mcg oral tablet extended release    2010       take 1

 tablet by oral route daily             no longer taking

 

                Antifungal (clotrimazole) 1 % topical cream    2010       apply to the 

affected and surrounding areas of skin by topical route 2 times per day morning 
and evening                              

 

                sertraline 100 mg oral tablet    5/10/2011       2011       take 2 tablets (200 mg) by 

oral route once daily                   discontinued by Dr. Serrano

 

                mirtazapine 15 mg oral tablet                    2011        take 1 tablet (15 mg) by oral route 

once daily before bedtime               Dr. Serrano

 

                mirtazapine 15 mg oral tablet                    2011        take 1 tablet (15 mg) by oral route 

once daily before bedtime               dc'd by Dr. Serrano

 

                Pristiq 50 mg oral tablet extended release 24 hr                    2013        take 1 tablet (50

 mg) by oral route once daily           Dr. Serrano

 

                Pristiq 50 mg oral tablet extended release 24 hr                    2013        take 1 tablet (50

 mg) by oral route once daily           dose updated

 

                Vitamin B-12 1,000 mcg/mL injection solution    2011        inject 1 milliliter

 (1,000 mcg) by intramuscular route once a month    on list already

 

                    syringe with needle 1 mL 25 gauge x 1" miscellaneous syringe    2011

                          use for injection once a month     

 

                clotrimazole 1 % topical cream    2011        apply to the affected and surrounding

 areas of skin by topical route 2 times per day in the morning and evening     

 

                Vitamin D2 50,000 unit oral capsule    2011        take 1 capsule (50,000

 unit) by oral route once weekly        generic on list

 

                Pravachol 40 mg oral tablet    2012        take 1 tablet (40 mg) by oral 

route once daily for 90 days            generic on list

 

                lithium carbonate 300 mg oral capsule    2012        take 1 capsule by oral

 route daily                            dose updated

 

                Pristiq 100 mg oral tablet extended release 24 hr                    4/10/2012       take 1 and 1/2 

tablet (150 mg) by oral route once daily    Mental Health provider

 

                Pristiq 100 mg oral tablet extended release 24 hr                    4/10/2012       take 1 and 1/2 

tablet (150 mg) by oral route once daily    Discontinued by Dr Efrain Knight at [facility]

 

                hydroxyzine HCl 50 mg oral tablet    10/16/2014      2015       take 1 tablet (50 mg) 

by oral route at bedtime                 

 

                lithium carbonate 300 mg oral capsule    2015       take 1 capsule (300

 mg) by oral route 2 for 30 days         

 

                fluconazole 100 mg oral tablet    2015       12/3/2015       take 1 tablet (100 mg) by 

oral route once a week                   

 

                ketoconazole 2 % topical cream    2015       12/3/2015       apply to the affected area(s)

 by topical route 2 times per day        

 

                prednisone 10 mg oral tablet    12/3/2015       2016        take 2 tablets (20 mg) by oral

 route once daily for 4 days 1 tablet daily for 4 days 0.5 tablet daily for 4 
days                                     







Problem List







                    Description         Status              Onset

 

                    Artificial opening status; colostomy    Active               

 

                    Bipolar disorder, unspecified    Active               

 

                    Hyperlipidemia      Active               

 

                    Peritoneal Neoplasm, Malignant    Active               

 

                    Anemia, Pernicious    Active               

 

                    Arthritis unspecified    Active               

 

                    B12 deficiency      Active               







Vital Signs







      Date    Time    BP-Sys(mm[Hg]    BP-Lynn(mm[Hg])    HR(bpm)    RR(rpm)    Temp    WT    HT    HC    BMI

                    BSA                 BMI Percentile      O2 Sat(%)

 

       2016    3:11:00 PM    134 mmHg    76 mmHg    80 bpm    20 rpm    98 F    163 lbs    69 in     

                24.07 kg/m2     1.90 m2                         98 %

 

        2016    2:04:00 PM    142 mmHg    86 mmHg    68 bpm    16 rpm    98.5 F    166 lbs    63 in

                          29.4053 kg/m    1.8295 m                 100 %

 

        2016    11:27:00 AM    148 mmHg    78 mmHg    90 bpm    20 rpm    98.2 F    153 lbs    69 in

                          22.59 kg/m2    1.84 m2                   96 %

 

        12/3/2015    9:50:00 AM    132 mmHg    70 mmHg    62 bpm    16 rpm    97.9 F    145 lbs    69 in

                          21.4125 kg/m    1.7894 m                 100 %

 

        2015    8:52:00 AM    132 mmHg    68 mmHg    52 bpm    20 rpm    97.8 F    141 lbs    69 in

                          20.82 kg/m2    1.76 m2                   100 %

 

        2015    3:25:00 PM    120 mmHg    62 mmHg    72 bpm    16 rpm    98.1 F    136 lbs    69 in

                          20.0835 kg/m    1.733 m                 98 %

 

       3/23/2015    2:55:00 PM    130 mmHg    76 mmHg    68 bpm    18 rpm    97 F    140 lbs    69 in    

                20.67 kg/m2     1.76 m2                         98 %

 

        10/16/2014    11:11:00 AM    120 mmHg    66 mmHg    77 bpm    20 rpm    98 F    130 lbs    69 in

                          19.1974 kg/m    1.6943 m                 100 %

 

        2014    3:21:00 PM    130 mmHg    66 mmHg    63 bpm    18 rpm    97.2 F    160 lbs    69 in

                          23.63 kg/m2    1.88 m2                   99 %

 

        2013    10:35:00 AM    132 mmHg    70 mmHg    66 bpm    20 rpm    98.1 F    157 lbs    69 in

                          23.1846 kg/m    1.862 m                  

 

        2013    1:29:00 PM    132 mmHg    70 mmHg    76 bpm    18 rpm    98.2 F    166 lbs    69 in 

                          24.51 kg/m2    1.91 m2                    

 

       2013    2:46:00 PM    128 mmHg    70 mmHg    76 bpm    16 rpm    98 F    160 lbs    69 in     

                23.6276 kg/m    1.8797 m                       

 

        2011    8:49:00 AM    128 mmHg    78 mmHg    70 bpm    18 rpm    97.9 F    164 lbs    69 in

                          24.22 kg/m2    1.90 m2                    

 

     2011    1:31:00 PM    132 mmHg    68 mmHg    84 bpm         97 F    167 lbs                        

                                         

 

        2011    9:09:00 AM    128 mmHg    70 mmHg    72 bpm    18 rpm    98.2 F    163 lbs    64 in 

                          27.98 kg/m2    1.83 m2                    

 

       2011    10:01:00 AM    132 mmHg    70 mmHg    72 bpm    18 rpm    98.2 F    154 lbs             

                                                                 

 

       2011    2:47:00 PM    128 mmHg    70 mmHg    72 bpm    18 rpm    97.8 F    156 lbs             

                                                                 

 

       5/10/2011    3:16:00 PM    144 mmHg    80 mmHg    72 bpm    18 rpm    98.2 F    158 lbs             

                                                                 

 

        2011    10:11:00 AM    132 mmHg    70 mmHg    70 bpm    18 rpm    98.2 F    168 lbs    69 in

                          24.81 kg/m2    1.93 m2                    

 

        4/15/2011    10:52:00 AM    110 mmHg    60 mmHg    75 bpm    16 rpm    97.5 F    172.375 lbs    

69 in                     25.4551 kg/m    1.951 m                 100 %

 

        2011    11:43:00 AM    120 mmHg    82 mmHg    75 bpm    16 rpm    97.2 F    178.5 lbs    69

 in                       26.36 kg/m2    1.99 m2                   100 %

 

        10/15/2010    1:32:00 PM    120 mmHg    70 mmHg    80 bpm    18 rpm    96.6 F    177 lbs    69 in

                          26.1381 kg/m    1.977 m                 100 %

 

        2010    3:50:00 PM    168 mmHg    100 mmHg    82 bpm    18 rpm    97.8 F    177.5 lbs    69

 in                       26.21 kg/m2    1.98 m2                   97 %

 

        2010    1:21:00 PM    140 mmHg    80 mmHg    59 bpm    16 rpm    97.6 F    173.25 lbs    69 

in                        25.5843 kg/m    1.956 m                 100 %

 

        2010    3:02:00 PM    140 mmHg    80 mmHg    61 bpm    16 rpm    97.6 F    173.125 lbs    69

 in                       25.57 kg/m2    1.96 m2                   99 %

 

        2010    1:23:00 PM    130 mmHg    80 mmHg    66 bpm    16 rpm    96.8 F    173 lbs    69 in 

                          25.5474 kg/m    1.9546 m                 100 %

 

        2010    12:58:00 PM    130 mmHg    88 mmHg    75 bpm    16 rpm    98.4 F    172.25 lbs    69

 in                       25.44 kg/m2    1.95 m2                   100 %







Social History







                    Name                Description         Comments

 

                    denies alcohol use                         

 

                    denies smoking                           

 

                    Denies illicit substance abuse                         

 

                    retired                                 direct care

 

                    Single                                   

 

                    Exercises regularly                         

 

                    Attended some college                         

 

                    Tobacco             Never smoker         







History of Procedures







                    Date Ordered        Description         Order Status

 

                    2010 12:00 AM    COMPREHEN METABOLIC PANEL    Reviewed

 

                    2010 12:00 AM    COMPLETE CBC W/AUTO DIFF WBC    Reviewed

 

                    2010 12:00 AM    LIPID PANEL         Reviewed

 

                          2015 12:00 AM        B12 Injection, Up to 1000 Mcg NDC#4397-7359-09 C Medicare 

                                        Reviewed

 

                    2011 12:00 AM    MAMMOGRAM SCREENING    Reviewed

 

                    2011 12:00 AM    CYTOPATH C/V THIN LAYER    Reviewed

 

                    2011 12:00 AM    B12 Injection 1 cc NDC#[address]-61    Reviewed

 

                    2015 12:00 AM    THER/PROPH/DIAG INJ SC/IM    Reviewed

 

                    2015 12:00 AM    B12 Injection, Up to 1000 Mcg NDC#6870-5474-11    Reviewed

 

                    2011 12:00 AM    THER/PROPH/DIAG INJ SC/IM    Reviewed

 

                    2011 12:00 AM    B12 Injection([place]) Ndc#2644-3385-43-    Reviewed

 

                    2015 12:00 AM    THER/PROPH/DIAG INJ SC/IM    Reviewed

 

                    2015 12:00 AM    B12 Injection, Up to 1000 Mcg NDC#2227-3941-94    Reviewed

 

                    10/20/2011 12:00 AM    THER/PROPH/DIAG INJ SC/IM    Reviewed

 

                    10/20/2011 12:00 AM    B12 Injection(Arabella) Ndc#8265-7700-99-    Reviewed

 

                    2016 12:00 AM    THER/PROPH/DIAG INJ SC/IM    Reviewed

 

                    2016 12:00 AM    B12 Injection, Up to 1000 Mcg NDC#4987-5322-80    Reviewed

 

                    3/14/2016 12:00 AM    VITAMIN B-12        Reviewed

 

                    3/15/2016 12:00 AM    THER/PROPH/DIAG INJ SC/IM    Reviewed

 

                    3/15/2016 12:00 AM    B12 Injection, Up to 1000 Mcg NDC#8893-0876-19    Reviewed

 

                    2011 12:00 AM    ***Immunization administration, Medicare flu    Reviewed

 

                    2011 12:00 AM    Fluzone ** MEDICARE Only **    Reviewed

 

                    2011 12:00 AM    THER/PROPH/DIAG INJ SC/IM    Reviewed

 

                    2011 12:00 AM    B12 Injection ([employer] [facility]) Ndc#9067-1984-65    Reviewed

 

                    2016 12:00 AM    B12 Injection, Up to 1000 Mcg NDC#3557-6377-99 [facility] Medicare    

Reviewed

 

                    2016 12:00 AM    TTE W/DOPPLER COMPLETE    Reviewed

 

                    2016 12:00 AM    EXTREMITY STUDY     Returned

 

                          2016 12:00 AM        B12 Injection, Up to 1000 Mcg NDC#6221-8843-28 [facility] Medicare 

                                        Reviewed

 

                    2016 12:00 AM    THER/PROPH/DIAG INJ SC/IM    Reviewed

 

                    2012 12:00 AM    THER/PROPH/DIAG INJ SC/IM    Reviewed

 

                    2012 12:00 AM    B12 Injection ([employer]) Ndc#8674-9345-83    Reviewed

 

                    2016 12:00 AM    THER/PROPH/DIAG INJ SC/IM    Reviewed

 

                    2016 12:00 AM    B12 Injection, Up to 1000 Mcg NDC#7534-1310-33    Reviewed

 

                    2012 12:00 AM    THER/PROPH/DIAG INJ SC/IM    Reviewed

 

                    2012 12:00 AM    B12 Injection(Arabella) Ndc#7238-4953-85-    Reviewed

 

                    5/3/2012 12:00 AM    THER/PROPH/DIAG INJ SC/IM    Reviewed

 

                    5/3/2012 12:00 AM    B12 Injection([place]ella) Ndc#4240-8739-66-    Reviewed

 

                    2012 12:00 AM    IMMUNOTHERAPY INJECTIONS    Reviewed

 

                    2012 12:00 AM    B12 Injection(Arabella) Ndc#8315-1669-14-    Reviewed

 

                    2012 12:00 AM    THER/PROPH/DIAG INJ SC/IM    Reviewed

 

                    2012 12:00 AM    B12 Injection, Up to 1000 Mcg NDC#4381-4021-04    Reviewed

 

                    2012 12:00 AM    THER/PROPH/DIAG INJ SC/IM    Reviewed

 

                    2012 12:00 AM    B12 Injection, Up to 1000 Mcg NDC#8089-2011-64    Reviewed

 

                    2012 12:00 AM    THER/PROPH/DIAG INJ SC/IM    Reviewed

 

                    2012 12:00 AM    B12 Injection, Up to 1000 Mcg NDC#0858-1741-47    Reviewed

 

                    10/16/2012 12:00 AM    THER/PROPH/DIAG INJ SC/IM    Reviewed

 

                    10/16/2012 12:00 AM    B12 Injection, Up to 1000 Mcg NDC#3774-6099-85    Reviewed

 

                    2010 12:00 AM    COMPREHEN METABOLIC PANEL    Reviewed

 

                    2010 12:00 AM    COMPLETE CBC W/AUTO DIFF WBC    Reviewed

 

                    2010 12:00 AM    LIPID PANEL         Reviewed

 

                    2013 12:00 AM    Flu Injection 3 Years And Above NDC# [address]-45  [facility]    Reviewed



 

                    2013 12:00 AM    COMPLETE CBC W/AUTO DIFF WBC    Reviewed

 

                    2013 12:00 AM    ASSAY OF LITHIUM    Reviewed

 

                    2013 12:00 AM    METABOLIC PANEL TOTAL CA    Reviewed

 

                    4/3/2013 12:00 AM    THER/PROPH/DIAG INJ SC/IM    Reviewed

 

                    4/3/2013 12:00 AM    B12 Injection, Up to 1000 Mcg NDC#6291-4740-07    Reviewed

 

                    2013 12:00 AM    THER/PROPH/DIAG INJ SC/IM    Reviewed

 

                    2013 12:00 AM    B12 Injection, Up to 1000 Mcg NDC#7709-8569-90    Reviewed

 

                    2013 12:00 AM    THER/PROPH/DIAG INJ SC/IM    Reviewed

 

                    2013 12:00 AM    B12 Injection, Up to 1000 Mcg NDC#8374-3644-88    Reviewed

 

                    2013 12:00 AM    LIPID PANEL         Reviewed

 

                    2013 12:00 AM    VITAMIN D 25 HYDROXY    Reviewed

 

                    2013 12:00 AM    THER/PROPH/DIAG INJ SC/IM    Reviewed

 

                    3/6/2014 12:00 AM    THER/PROPH/DIAG INJ SC/IM    Reviewed

 

                    2014 12:00 AM    THER/PROPH/DIAG INJ SC/IM    Reviewed

 

                    2014 12:00 AM    B12 Injection, Up to 1000 Mcg NDC#8472-7413-43    Reviewed

 

                    2010 12:00 AM    SKIN FUNGI CULTURE    Reviewed

 

                    10/9/2010 12:00 AM    COMPREHEN METABOLIC PANEL    Reviewed

 

                    10/9/2010 12:00 AM    LIPID PANEL         Reviewed

 

                    2010 12:00 AM    THER/PROPH/DIAG INJ SC/IM    Reviewed

 

                    2010 12:00 AM    B12 Injection Ndc#[address]-33 (Angel)    Reviewed

 

                    2010 12:00 AM    THER/PROPH/DIAG INJ SC/IM    Reviewed

 

                    2010 12:00 AM    Kenalog 40 Mg Im-Ndc#[address]-41 (Stan[place])    Reviewed

 

                    10/15/2010 12:00 AM    FLU VACCINE 3 YRS & > IM    Reviewed

 

                    10/15/2010 12:00 AM    Admin.Of M/C Cov.Vaccine-Flu Vacc.    Reviewed

 

                    1/15/2011 12:00 AM    COMPLETE CBC W/AUTO DIFF WBC    Reviewed

 

                    1/15/2011 12:00 AM    COMPREHEN METABOLIC PANEL    Reviewed

 

                    1/15/2011 12:00 AM    LIPID PANEL         Reviewed

 

                    2014 12:00 AM    MAMMOGRAM SCREENING    Reviewed

 

                    2014 12:00 AM    Screening mammography, bilateral    Reviewed

 

                    7/10/2014 12:00 AM    THER/PROPH/DIAG INJ SC/IM    Reviewed

 

                    7/10/2014 12:00 AM    B12 Injection, Up to 1000 Mcg NDC#5259-7542-64    Reviewed

 

                    2011 12:00 AM    COMPLETE CBC W/AUTO DIFF WBC    Reviewed

 

                    2011 12:00 AM    COMPREHEN METABOLIC PANEL    Reviewed

 

                    2011 12:00 AM    LIPID PANEL         Reviewed

 

                    2014 12:00 AM    B12 Injection, Up to 1000 Mcg NDC#5454-7493-33    Reviewed

 

                    10/19/2014 12:00 AM    MAMMOGRAM SCREENING    Reviewed

 

                    10/19/2014 12:00 AM    Screening mammography, bilateral    Reviewed

 

                    10/16/2014 12:00 AM    COMPLETE CBC W/AUTO DIFF WBC    Reviewed

 

                    10/16/2014 12:00 AM    COMPREHEN METABOLIC PANEL    Reviewed

 

                    10/16/2014 12:00 AM    IMMUNOASSAY TUMOR     Reviewed

 

                    10/16/2014 12:00 AM    LIPID PANEL         Reviewed

 

                    10/16/2014 12:00 AM    ASSAY OF LITHIUM    Reviewed

 

                    10/16/2014 12:00 AM    MAMMOGRAM SCREENING    Reviewed

 

                    2011 12:00 AM    ASSAY OF PARATHORMONE    Reviewed

 

                    2011 12:00 AM    VITAMIN D 25 HYDROXY    Reviewed

 

                    2011 12:00 AM    ASSAY OF LITHIUM    Reviewed

 

                    2011 12:00 AM    METABOLIC PANEL TOTAL CA    Reviewed

 

                    2011 12:00 AM    CT HEAD/BRAIN W/O & W/DYE    Reviewed

 

                    3/23/2015 12:00 AM    PNEUMOCOCCAL VACC 13 GLENDY IM    Reviewed

 

                    3/23/2015 12:00 AM    Vitamin B12 injection    Reviewed

 

                    2011 12:00 AM    ASSAY OF LITHIUM    Reviewed

 

                    2011 12:00 AM    B12 Injection Ndc#[address]-15  [place]    Reviewed

 

                    2015 12:00 AM    THER/PROPH/DIAG INJ SC/IM    Reviewed

 

                    2015 12:00 AM    B12 Injection, Up to 1000 Mcg NDC#9689-8657-16    Reviewed

 

                    2015 12:00 AM    COMPLETE CBC W/AUTO DIFF WBC    Reviewed

 

                    2015 12:00 AM    COMPREHEN METABOLIC PANEL    Reviewed

 

                    2015 12:00 AM    LIPID PANEL         Reviewed

 

                    2015 12:00 AM    ASSAY OF LITHIUM    Reviewed

 

                    2011 12:00 AM    VIT D 1 25-DIHYDROXY    Reviewed

 

                    2011 12:00 AM    VITAMIN B-12        Reviewed

 

                    2015 12:00 AM    B12 Injection, Up to 1000 Mcg NDC#1801-7118-10    Reviewed

 

                    2015 12:00 AM    THER/PROPH/DIAG INJ SC/IM    Reviewed

 

                    2015 12:00 AM    B12 Injection, Up to 1000 Mcg NDC#2636-3295-30    Reviewed

 

                    2011 12:00 AM    THER/PROPH/DIAG INJ SC/IM    Reviewed

 

                    2011 12:00 AM    B12 Injection ([employer] [facility]) Ndc#2402-3439-82    Reviewed

 

                    2015 12:00 AM    THER/PROPH/DIAG INJ SC/IM    Reviewed

 

                    2015 12:00 AM    B12 Injection, Up to 1000 Mcg NDC#6904-1366-80    Reviewed







Results Summary







                          Data and Description      Results

 

                          2004 12:00 AM        Colonoscopy-Women and Men over 50 Normal 

 

                          2008 12:00 AM         Pap Smear Declined 

 

                          10/7/2009 12:00 AM        Cholest Cry Stone Ql .0 %LDLc SerPl-mCnc 123.0 mg/dLHDLc

 SerPl-mCnc 34.0 mg/dLTrigl SerPl-mCnc 190.0 mg/dLGlucose SerPl-mCnc 78.0 mg/dL

 

                          2009 12:00 AM        Mammogram -Women over 40 Normal HIV1+2 Ab Ser Ql no risk 

 

                          2010 8:47 AM         Dexa Bone Scan Refused Aspirin reccommended Contraindication 



 

                          2010 8:48 AM         Depression Done 

 

                          2010 12:00 AM         Foot Exam-Diabetic Done 

 

                          2010 12:00 AM         Cholest Cry Stone Ql .0 %LDLc SerPl-mCnc 126.0 mg/dLGlucose

 SerPl-mCnc 102.0 mg/dL

 

                          2010 8:45 AM          TRIGLYCERIDES 122.0 mg/dLCHOLESTEROL 186.0 mg/dLHDL 36.0 mg/dLTOT

 CHOL/HDL 5.2 LDL (CALC) 126.0 mg/dLGLUCOSE 102.0 mg/dLSODIUM 143.0 
mmol/LPOTASSIUM 3.70 mmol/LCHLORIDE 111.0 mmol/LCO2 23.0 mmol/LBUN 10.0 
mg/dLCREATININE 0.80 mg/dLSGOT/AST 12.0 IU/LSGPT/ALT 11.0 IU/LALK PHOS 65.0 
IU/LTOTAL PROTEIN 7.20 g/dLALBUMIN 3.90 g/dLTOTAL BILI 0.50 mg/dLCALCIUM 10.20 
mg/dLAGE 59 GFR NonAA 73 GFR AA 88 eGFR >60 mL/min/1.73 m2eGFR AA* >60 WBC 5.7 
RBC 3.26 HGB 10.60 g/dLHCT 31.70 %MCV 97.0 fLMCH 32.50 pgMCHC 33.40 g/dLRDW SD 
47 RDW CV 13.30 %MPV 9.70 fLPLT 287 NRBC# 0.00 NRBC% 0.0 %NEUT 62.90 %%LYMP 
21.80 %%MONO 9.90 %%EOS 5.0 %%BASO 0.40 %#NEUT 3.56 #LYMP 1.23 #MONO 0.56 #EOS 
0.28 #BASO 0.02 MANUAL DIFF NOT IND 

 

                          2010 12:00 AM        Glucose SerPl-mCnc 96.0 mg/dLCholest Cry Stone Ql .0 %LDLc

 SerPl-mCnc 146.0 mg/dL

 

                          2010 8:26 AM         TRIGLYCERIDES 106.0 mg/dLCHOLESTEROL 199.0 mg/dLHDL 32.0 mg/dLTOT

 CHOL/HDL 6.2 LDL (CALC) 146.0 mg/dLGLUCOSE 96.0 mg/dLSODIUM 143.0 
mmol/LPOTASSIUM 4.0 mmol/LCHLORIDE 113.0 mmol/LCO2 24.0 mmol/LBUN 13.0 
mg/dLCREATININE 1.0 mg/dLSGOT/AST 11.0 IU/LSGPT/ALT 6.0 IU/LALK PHOS 56.0 
IU/LTOTAL PROTEIN 6.60 g/dLALBUMIN 3.80 g/dLTOTAL BILI 0.50 mg/dLCALCIUM 9.30 
mg/dLAGE 59 GFR NonAA 57 GFR AA 69 eGFR 57 eGFR AA* >60 

 

                          10/6/2010 12:00 AM        Cholest Cry Stone Ql .0 %LDLc SerPl-mCnc 111.0 mg/dLGlucose

 SerPl-mCnc 81.0 mg/dL

 

                          10/6/2010 2:45 PM         TRIGLYCERIDES 123.0 mg/dLCHOLESTEROL 178.0 mg/dLHDL 42.0 mg/dLTOT

 CHOL/HDL 4.2 LDL (CALC) 111.0 mg/dLGLUCOSE 81.0 mg/dLSODIUM 139.0 
mmol/LPOTASSIUM 4.10 mmol/LCHLORIDE 106.0 mmol/LCO2 24.0 mmol/LBUN 13.0 
mg/dLCREATININE 0.90 mg/dLSGOT/AST 13.0 IU/LSGPT/ALT 11.0 IU/LALK PHOS 61.0 
IU/LTOTAL PROTEIN 7.10 g/dLALBUMIN 3.90 g/dLTOTAL BILI 0.30 mg/dLCALCIUM 9.30 
mg/dLAGE 60 GFR NonAA 64 GFR AA 78 eGFR >60 mL/min/1.73 m2eGFR AA* >60 WBC 6.9 
RBC 3.59 HGB 11.50 g/dLHCT 35.30 %MCV 98.0 fLMCH 32.0 pgMCHC 32.60 g/dLRDW SD 46
 RDW CV 12.90 %MPV 9.90 fLPLT 311 NRBC# 0.00 NRBC% 0.0 %NEUT 64.90 %%LYMP 22.50 
%%MONO 7.20 %%EOS 5.10 %%BASO 0.30 %#NEUT 4.45 #LYMP 1.54 #MONO 0.49 #EOS 0.35 
#BASO 0.02 MANUAL DIFF NOT IND 

 

                          2011 12:00 AM         Mammogram -Women over 40 Ordered 

 

                          2011 10:25 AM        TRIGLYCERIDES 111.0 mg/dLCHOLESTEROL 195.0 mg/dLHDL 43.0 mg/dLTOT

 CHOL/HDL 4.5 LDL (CALC) 130.0 mg/dLWBC 5.3 RBC 3.76 HGB 12.0 g/dLHCT 37.80 %MCV
 101.0 fLMCH 31.90 pgMCHC 31.70 g/dLRDW SD 47 RDW CV 13.0 %MPV 9.70 fLPLT 259 
NRBC# 0.00 NRBC% 0.0 %NEUT 69.0 %%LYMP 17.60 %%MONO 8.30 %%EOS 4.70 %%BASO 0.40 
%#NEUT 3.63 #LYMP 0.93 #MONO 0.44 #EOS 0.25 #BASO 0.02 MANUAL DIFF NOT IND 
GLUCOSE 102.0 mg/dLSODIUM 146.0 mmol/LPOTASSIUM 4.20 mmol/LCHLORIDE 113.0 
mmol/LCO2 23.0 mmol/LBUN 15.0 mg/dLCREATININE 1.0 mg/dLSGOT/AST 12.0 
IU/LSGPT/ALT 17.0 IU/LALK PHOS 60.0 IU/LTOTAL PROTEIN 6.90 g/dLALBUMIN 4.20 
g/dLTOTAL BILI 0.40 mg/dLCALCIUM 9.70 mg/dLAGE 60 GFR NonAA 57 GFR AA 69 eGFR 57
 eGFR AA* >60 

 

                          2011 11:49 AM        Cholest Cry Stone Ql .0 %LDLc SerPl-mCnc 130.0 mg/dLHDLc

 SerPl-mCnc 43.0 mg/dLTrigl SerPl-mCnc 111.0 mg/dLGlucose SerPl-mCnc 102.0 mg/dL

 

                          2011 11:52 AM        Pap Smear Declined 

 

                          2011 11:28 AM        Lithium 2.080 mmol/LGLUCOSE 102.0 mg/dLSODIUM 135.0 mmol/LPOTASSIUM

 3.90 mmol/LCHLORIDE 106.0 mmol/LCO2 21.0 mmol/LBUN 12.0 mg/dLCREATININE 1.30 
mg/dLCALCIUM 10.70 mg/dLAGE 60 GFR NonAA 42 GFR AA 51 eGFR 42 eGFR AA* 51 

 

                          2011 8:58 AM          Lithium 0.690 mmol/L

 

                          2011 2:38 PM         VITAMIN B12 3483.0 pg/mL

 

                          2013 3:35 PM          WBC 5.1 RBC 3.73 HGB 11.70 g/dLHCT 36.40 %MCV 98.0 fLMCH 31.40

 pgMCHC 32.10 g/dLRDW SD 47 RDW CV 13.10 %MPV 9.80 fLPLT 224 NRBC# 0.00 NRBC% 
0.0 %NEUT 66.80 %%LYMP 19.10 %%MONO 9.0 %%EOS 4.90 %%BASO 0.20 %#NEUT 3.42 #LYMP
 0.98 #MONO 0.46 #EOS 0.25 #BASO 0.01 MANUAL DIFF NOT IND GLUCOSE 88.0 
mg/dLSODIUM 141.0 mmol/LPOTASSIUM 4.10 mmol/LCHLORIDE 110.0 mmol/LCO2 22.0 
mmol/LBUN 22.0 mg/dLCREATININE 1.10 mg/dLCALCIUM 9.80 mg/dLAGE 62 GFR NonAA 50 
GFR AA 61 eGFR 50 eGFR AA* 60 Lithium 0.760 mmol/L

 

                          2013 11:02 AM        TRIGLYCERIDES 106.0 mg/dLCHOLESTEROL 181.0 mg/dLHDL 46.0 mg/dLTOT

 CHOL/HDL 3.9 LDL (CALC) 114.0 mg/dLVITAMIN D 41.10 ng/mL

 

                          10/17/2014 10:10 AM       WBC 5.0 RBC 3.66 HGB 11.60 g/dLHCT 36.80 %.0 fLMCH 31.70

 pgMCHC 31.50 g/dLRDW SD 50 RDW CV 13.50 %MPV 10.10 fLPLT 209 NRBC# 0.00 NRBC% 
0.0 %NEUT 69.20 %%LYMP 21.0 %%MONO 6.40 %%EOS 3.20 %%BASO 0.20 %#NEUT 3.46 #LYMP
 1.05 #MONO 0.32 #EOS 0.16 #BASO 0.01 MANUAL DIFF NOT IND GLUCOSE 100.0 
mg/dLSODIUM 148.0 mmol/LPOTASSIUM 3.90 mmol/LCHLORIDE 114.0 mmol/LCO2 26.0 
mmol/LBUN 12.0 mg/dLCREATININE 1.20 mg/dLSGOT/AST 9.0 IU/LSGPT/ALT <6 IU/LALK 
PHOS 82.0 IU/LTOTAL PROTEIN 6.90 g/dLALBUMIN 4.0 g/dLTOTAL BILI 0.40 
mg/dLCALCIUM 10.50 mg/dLAGE 64 GFR NonAA 45 GFR AA 55 eGFR 45 eGFR AA* 55 
TRIGLYCERIDES 96.0 mg/dLCHOLESTEROL 155.0 mg/dLHDL 38.0 mg/dLTOT CHOL/HDL 4.1 
LDL (CALC) 98.0 mg/dLLithium 0.850 mmol/LCancer Antigen (CA) 125 8.30 U/mL

 

                          2015 10:25 AM        Lithium 0.790 mmol/LWBC 4.8 RBC 3.44 HGB 11.0 g/dLHCT 35.20 

%.0 fLMCH 32.0 pgMCHC 31.30 g/dLRDW SD 53 RDW CV 14.0 %MPV 9.30 fLPLT 210
 NRBC# 0.00 NRBC% 0.0 %NEUT 70.80 %%LYMP 17.20 %%MONO 8.10 %%EOS 3.50 %%BASO 
0.40 %#NEUT 3.41 #LYMP 0.83 #MONO 0.39 #EOS 0.17 #BASO 0.02 MANUAL DIFF NOT IND 
TRIGLYCERIDES 107.0 mg/dLCHOLESTEROL 174.0 mg/dLHDL 43.0 mg/dLTOT CHOL/HDL 4.0 
LDL (CALC) 110.0 mg/dLGLUCOSE 90.0 mg/dLSODIUM 145.0 mmol/LPOTASSIUM 3.80 
mmol/LCHLORIDE 115.0 mmol/LCO2 24.0 mmol/LBUN 17.0 mg/dLCREATININE 1.30 
mg/dLSGOT/AST 18.0 IU/LSGPT/ALT 17.0 IU/LALK PHOS 56.0 IU/LTOTAL PROTEIN 6.70 
g/dLALBUMIN 3.90 g/dLTOTAL BILI 0.40 mg/dLCALCIUM 9.80 mg/dLAGE 64 GFR NonAA 41 
GFR AA 50 eGFR 41 eGFR AA* 50 

 

                          2015 8:50 AM        WBC 5.8 RBC 3.29 HGB 10.70 g/dLHCT 34.0 %.0 fLMCH 32.50

 pgMCHC 31.50 g/dLRDW SD 52 RDW CV 13.60 %MPV 9.60 fLPLT 223 NRBC# 0.00 NRBC% 
0.0 %NEUT 69.60 %%LYMP 18.90 %%MONO 8.50 %%EOS 2.80 %%BASO 0.20 %#NEUT 4.03 
#LYMP 1.09 #MONO 0.49 #EOS 0.16 #BASO 0.01 MANUAL DIFF NOT IND Lithium 0.620 
mmol/LGLUCOSE 83.0 mg/dLSODIUM 139.0 mmol/LPOTASSIUM 3.90 mmol/LCHLORIDE 109.0 
mmol/LCO2 22.0 mmol/LBUN 19.0 mg/dLCREATININE 1.40 mg/dLSGOT/AST 19.0 
IU/LSGPT/ALT 21.0 IU/LALK PHOS 55.0 IU/LTOTAL PROTEIN 6.50 g/dLALBUMIN 3.90 
g/dLTOTAL BILI 0.50 mg/dLCALCIUM 9.60 mg/dLAGE 65 GFR NonAA 38 GFR AA 46 eGFR 38
 eGFR AA* 46 TRIGLYCERIDES 121.0 mg/dLCHOLESTEROL 192.0 mg/dLHDL 51.0 mg/dLTOT 
CHOL/HDL 3.8 .0 mg/dLFREE T4 0.79 TSH 1.210 uIU/mLHemoglobin A1c 5.40 
%Estim. Avg Glu (eAG) 108 

 

                          3/15/2016 8:08 AM         VITAMIN B12 696.0 pg/mL

 

                          3/23/2016 8:26 AM         WBC 7.0 RBC 3.61 HGB 11.80 g/dLHCT 37.70 %.0 fLMCH 32.70

 pgMCHC 31.30 g/dLRDW SD 49 RDW CV 12.50 %MPV 10.0 fLPLT 207 NRBC# 0.00 NRBC% 
0.0 %NEUT 73.60 %%LYMP 16.40 %%MONO 6.60 %%EOS 3.0 %%BASO 0.30 %#NEUT 5.15 #LYMP
 1.15 #MONO 0.46 #EOS 0.21 #BASO 0.02 MANUAL DIFF NOT IND Lithium 0.940 
mmol/LGLUCOSE 108.0 mg/dLSODIUM 143.0 mmol/LPOTASSIUM 4.30 mmol/LCHLORIDE 110.0 
mmol/LCO2 27.0 mmol/LBUN 16.0 mg/dLCREATININE 1.60 mg/dLSGOT/AST 13.0 
IU/LSGPT/ALT 7.0 IU/LALK PHOS 71.0 IU/LTOTAL PROTEIN 6.80 g/dLALBUMIN 4.0 
g/dLTOTAL BILI 0.20 mg/dLCALCIUM 10.40 mg/dLAGE 65 GFR NonAA 32 GFR AA 39 eGFR 
32 eGFR AA* 39 TRIGLYCERIDES 113.0 mg/dLCHOLESTEROL 169.0 mg/dLHDL 42.0 mg/dLTOT
 CHOL/HDL 4.0 LDL (CALC) 104.0 mg/dLFREE T4 0.86 TSH 2.20 uIU/mLHemoglobin A1c 
5.20 %Estim. Avg Glu (eAG) 103 

 

                          3/25/2016 9:17 AM         COLOR YELLOW APPEARANCE CLEAR SPEC GRAV 1.010 pH 7.0 PROTEIN 

NEGATIVE GLUCOSE NEGATIVE mg/dLKETONE NEGATIVE BILIRUBIN NEGATIVE BLOOD NEGATIVE
 NITRITE NEGATIVE LEUK SCREEN SMALL MICRO IND? SEE BELOW WBC/HPF 0-5 RBC/HPF 
NEGATIVE CASTS/LPF NEGATIVE /LPFCRYSTALS NEGATIVE MUCOUS THRDS NEGATIVE BACTERIA
 NEGATIVE EPITH CELLS FEW SQUAMOUS /HPFTRICHOMONAS NEGATIVE YEAST NEGATIVE 







History Of Immunizations







       Name    Date Admin    Mfg Name    Mfg Code    Trade Name    Lot#    Route    Inj    Vis Given    Vis

 Pub                                    CVX

 

        Influenza    2008    Not Entered    NE      Not Entered            Not Entered    Not Entered

                    1            999

 

        X       12/19/2008    [employer]    MSD     Pneumovax 23            Intramuscular    Not Entered

                    1            999

 

           Influenza    10/15/2010    [employer]    NOV        Fluvirin > 12 Years    

[id number]     Intramuscular    Left Deltoid    10/15/2010    2009    999

 

          X         3/23/2015    [facility]    [facility]       Prevnar 13    [id number]    Intramuscular

                Right Gluteous Medius    3/23/2015       2013       109







History of Past Illness







                    Name                Date of Onset       Comments

 

                    Peritoneal Neoplasm, Malignant                         

 

                    Hyperlipidemia                           

 

                    Bipolar disorder, unspecified                         

 

                    Artificial opening status; colostomy                         

 

                    B12 deficiency                           

 

                    Anemia, Pernicious                         

 

                    Arthritis unspecified                         

 

                    cervical cancer                          

 

                    Artificial opening status; colostomy    2010  1:10PM     

 

                    Bipolar disorder, unspecified    2010  1:10PM     

 

                    Hyperlipidemia      2010  1:10PM     

 

                    Anemia, Pernicious    2010  1:10PM     

 

                    Postoperative Follow-Up    2010  1:55PM     

 

                    Postoperative Follow-Up    Mar  8 2010 10:57AM     

 

                    Artificial opening status; colostomy    Mar  8 2010  1:19PM     

 

                    Peritoneal Neoplasm, Malignant    Mar  8 2010  1:19PM     

 

                    Artificial opening status; colostomy    2010  1:40PM     

 

                    Hyperlipidemia      2010  1:40PM     

 

                    Anemia, Pernicious    2010  1:40PM     

 

                    Peritoneal Neoplasm, Malignant    2010  1:40PM     

 

                    Arthritis unspecified    2010  1:40PM     

 

                    Anemia of Chronic Illness    2010  1:40PM     

 

                    Tinea corporis      2010  3:17PM     

 

                    Bipolar disorder, unspecified    2010  1:33PM     

 

                    Hyperlipidemia      2010  1:33PM     

 

                    Anemia, Pernicious    2010  1:33PM     

 

                    Peritoneal Neoplasm, Malignant    2010  1:33PM     

 

                    B12 deficiency      2010  1:33PM     

 

                    Ethmoidal Sinusitis, Acute    Sep 21 2010  3:53PM     

 

                    Wheezing            Sep 21 2010  3:53PM     

 

                    Flu                 Oct 15 2010  1:40PM     

 

                    Bipolar disorder, unspecified    Oct 15 2010  1:42PM     

 

                    Hyperlipidemia      Oct 15 2010  1:42PM     

 

                    Anemia, Pernicious    Oct 15 2010  1:42PM     

 

                    Peritoneal Neoplasm, Malignant    Oct 15 2010  1:42PM     

 

                    Bipolar disorder, unspecified    2011 12:01PM     

 

                    Hyperlipidemia      2011 12:01PM     

 

                    Anemia, Pernicious    2011 12:01PM     

 

                    Peritoneal Neoplasm, Malignant    2011 12:01PM     

 

                    Bipolar disorder, unspecified    Apr 15 2011 10:55AM     

 

                    Major Depression    2011 10:11AM     

 

                    Bipolar Disorder    2011 10:11AM     

 

                    Cancer              May 10 2011  4:16PM     

 

                    Major Depression    May 10 2011  3:16PM     

 

                    Bipolar Disorder    May 10 2011  3:16PM     

 

                    Hypercalcemia       May 23 2011  2:47PM     

 

                    Bipolar disorder, unspecified    May 23 2011  2:47PM     

 

                    Colon Cancer, Personal History    May 23 2011  2:47PM     

 

                    Bipolar Disorder    May 31 2011  4:39PM     

 

                    Depressive Disorder    2011 10:01AM     

 

                    Vitamin B12 deficiency    2011 10:01AM     

 

                    Vitamin D Deficiency    2011  5:07PM     

 

                    Anemia, Vitamin B12 Deficiency    2011  5:07PM     

 

                    B12 deficiency      2011  3:56PM     

 

                    Routine gynecological examination    Aug  4 2011  9:08AM     

 

                    Screening Examination for Breast Cancer    Aug  4 2011  9:08AM     

 

                    Tinea Corporis      Aug  4 2011  9:08AM     

 

                    Depressive Disorder    Sep 23 2011  8:47AM     

 

                    Contact Dermatitis    Sep 23 2011  8:47AM     

 

                    Anemia, Pernicious    Sep 23 2011  8:47AM     

 

                    B12 deficiency      Sep 23 2011  8:47AM     

 

                    B12 deficiency      Sep 27 2011  2:58PM     

 

                    B12 deficiency      Oct 20 2011  2:34PM     

 

                    Flu                 Dec  9 2011  3:16PM     

 

                    B12 deficiency      Dec  9 2011  3:17PM     

 

                    B12 deficiency      2012  4:52PM     

 

                    B12 deficiency      2012 11:10AM     

 

                    B12 deficiency      2012  3:37PM     

 

                    B12 deficiency      May  3 2012  4:10PM     

 

                    B12 deficiency      2012  2:54PM     

 

                    B12 deficiency      2012 11:23AM     

 

                    B12 deficiency      Aug  9 2012  2:08PM     

 

                    B12 deficiency      Sep  6 2012  4:36PM     

 

                    B12 deficiency      Oct 16 2012 10:23AM     

 

                    Flu                 Feb  2013  3:11PM     

 

                    Bipolar disorder, unspecified    Feb  2013  2:48PM     

 

                    Anemia, Pernicious    Feb  2013  2:48PM     

 

                    B12 deficiency      2013  2:48PM     

 

                    Extrapyramidal abnormal movement disorder    2013  2:48PM     

 

                    B12 deficiency      Apr  3 2013 12:03PM     

 

                    Bipolar disorder, unspecified    May  7 2013  1:31PM     

 

                    Anemia, Pernicious    May  7 2013  1:31PM     

 

                    B12 deficiency      May  7 2013  1:31PM     

 

                    Extrapyramidal abnormal movement disorder    May  7 2013  1:31PM     

 

                    B12 deficiency      2013  3:42PM     

 

                    B12 deficiency      2013  1:31PM     

 

                    Hyperlipidemia      Aug  7 2013 10:37AM     

 

                    Vitamin D Deficiency    Aug  7 2013 10:37AM     

 

                    Bipolar disorder, unspecified    Aug  7 2013 10:37AM     

 

                    Anemia, Pernicious    Aug  7 2013 10:37AM     

 

                    B12 deficiency      Aug  7 2013 10:37AM     

 

                    B12 deficiency      Sep 25 2013 11:15AM     

 

                    B12 deficiency      Dec 11 2013  3:16PM     

 

                    B12 deficiency      Mar  6 2014  1:48PM     

 

                    B12 deficiency      May 21 2014  3:17PM     

 

                    Screening Examination for Breast Cancer    2014  3:23PM     

 

                    Periumbilical abdominal pain    2014  3:23PM     

 

                    B12 deficiency      Jul 10 2014  2:52PM     

 

                    Anemia, Vitamin B12 Deficiency    Aug 13 2014  4:50PM     

 

                    Bipolar disorder    Oct 16 2014 11:13AM     

 

                    Hyperlipidemia      Oct 16 2014 11:13AM     

 

                    Anemia, Pernicious    Oct 16 2014 11:13AM     

 

                    Peritoneal Neoplasm, Malignant    Oct 16 2014 11:13AM     

 

                    Screening breast examination    Oct 16 2014 11:13AM     

 

                    Weight loss         Oct 16 2014 11:13AM     

 

                    Anemia, Pernicious    Mar 23 2015  2:57PM     

 

                    B12 deficiency      Mar 23 2015  2:57PM     

 

                    Need for Prevnar vaccine    Mar 23 2015  2:57PM     

 

                    Bipolar disorder    Mar 23 2015  2:57PM     

 

                    Hyperlipidemia      Mar 23 2015  2:57PM     

 

                    Anemia, Pernicious    Mar 23 2015  2:57PM     

 

                    Peritoneal Neoplasm, Malignant    Mar 23 2015  2:57PM     

 

                    B12 deficiency      May  4 2015  4:48PM     

 

                    Hyperlipidemia      May 13 2015  9:56AM     

 

                    Anemia              May 13 2015  9:56AM     

 

                    Bipolar disorder    May 13 2015  9:56AM     

 

                    Bipolar disorder    May 14 2015  3:27PM     

 

                    Hyperlipidemia      May 14 2015  3:27PM     

 

                    Anemia, Pernicious    May 14 2015  3:27PM     

 

                    Peritoneal Neoplasm, Malignant    May 14 2015  3:27PM     

 

                    B12 deficiency      2015  2:20PM     

 

                    B12 deficiency      2015 11:34AM     

 

                    B12 deficiency      Aug 18 2015  9:06AM     

 

                    Tinea Corporis      Sep 18 2015  8:54AM     

 

                    B12 deficiency      Sep 18 2015  8:54AM     

 

                    B12 deficiency      2015 10:28AM     

 

                    Herpes zoster without complication    Dec  3 2015  9:52AM     

 

                    B12 deficiency      Dec 23 2015 11:21AM     

 

                    B12 deficiency      2016  4:51PM     

 

                    Vitamin B 12 deficiency    Mar 14 2016  5:35PM     

 

                    B12 deficiency      Mar 15 2016 12:14PM     

 

                    B12 deficiency      May  5 2016 11:30AM     

 

                    Edema               May  5 2016 11:30AM     

 

                    Dermatitis          May  5 2016 11:30AM     

 

                    Edema               May 17 2016  8:38AM     

 

                    Shortness of breath    May 17 2016  8:38AM     

 

                    Bilateral edema of lower extremity    2016  2:06PM     

 

                    B12 deficiency      2016  2:06PM     

 

                    B12 deficiency      2016 11:50AM     

 

                    B12 deficiency      2016 11:20AM     

 

                    Diarrhea            Aug  2 2016  3:13PM     

 

                    B12 deficiency      Aug 24 2016 11:10AM     

 

                    Encounter for screening mammogram for breast cancer    Aug 24 2016 11:44AM     

 

                    B12 deficiency      Sep 28 2016  2:35PM     







Payers







           Insurance Name    Company Name    Plan Name    Plan Number    Policy Number    Policy Group

 Number                                 Start Date

 

                    Medicare Part A    Medicare RHC              [id number]              N/A

 

                          [employer]    [employer]                 [id number]

                                                    

 

                    Medicare Part A    Medicare - Lab/Xray              [id number]              2006

 

                    Medicare Part B    Medic[employer] [place]              [id number]              2006

 

                          [employer]    [employer]                 50 percent

                                                    2009

 

                    [facility]    [employer]              [id number]              N/A

 

                    Medicare Part A    Medicare Part A              [id number]              N/A

 

                    Medicare Part A    Medicare Part A              [id number]              2006









History of Encounters







                    Visit Date          Visit Type          Provider

 

                    2016           Nurse visit         Bret Forte APRFIORELLA

 

                    2016           Nurse visit         Bhupinder Louise DO

 

                    2016            Office visit        Bhupinder Louise DO

 

                    2016           Nurse visit         Bhupinder Louise DO

 

                    2016           Office visit        Bret Forte APRN

 

                    2016            Office visit        Bhupinder Louise DO

 

                    3/15/2016           Nurse visit         Bhupinder Louise DO

 

                    2016            Nurse visit         Bhupinder Louise DO

 

                    2015          Nurse visit         Bhupinder Louise DO

 

                    12/3/2015           Office visit        Bhupinder Louise DO

 

                    2015          Nurse visit         Bhupinder Louise DO

 

                    2015           Office visit        Bhupinder Aspen DO

 

                    2015           Nurse visit         Bhupinder Aspen DO

 

                    2015            Nurse visit         Bhupinder Aspen DO

 

                    2015            Nurse visit         Bhupinder Aspen DO

 

                    2015           Office visit        Bhupinder Aspen DO

 

                    2015            Nurse visit         Bhupinder Aspen DO

 

                    3/23/2015           Office visit        Bhupinder Aspen DO

 

                    10/16/2014          Office visit        Bhupinder Aspen DO

 

                    2014           Nurse visit         Radha Ontiveros APRN

 

                    7/10/2014           Nurse visit         Bhupinder Aspen DO

 

                    2014           Office visit        Bhupinder Aspen DO

 

                    2014           Nurse visit         Bhupinder Aspen DO

 

                    3/6/2014            Nurse visit         Bhupinder Aspen DO

 

                    2014            [facility]            EARNEST Lopez MD

 

                    2013          Nurse visit         Bhupinder Aspen DO

 

                    2013           Nurse visit         Bhupinder Aspen DO

 

                    2013            Office visit        Bhupinder Aspen DO

 

                    2013            Nurse visit         Bhupinder Aspen DO

 

                    2013            Nurse visit         Bhupinder Aspen DO

 

                    2013            Office visit        Bhupinder Aspen DO

 

                    4/3/2013            Nurse visit         Bhupinder Aspen DO

 

                    2013            Office visit        Bhupinder Aspen DO

 

                    10/16/2012          Nurse visit         Bhupinder Aspen DO

 

                    2012            Nurse visit         Bhupinder Aspen DO

 

                    2012            Voided              Bhupinder Aspen DO

 

                    2012            Nurse visit         Bhupinder Aspen DO

 

                    2012            Nurse visit         Bhupinder Aspen DO

 

                    2012           Nurse visit         Bhupinder Aspen DO

 

                    5/3/2012            Nurse visit         Bhupinder Aspen DO

 

                    2012           Nurse visit         Bhupinder Aspen DO

 

                    2012           Nurse visit         Bhupinder Aspen DO

 

                    2012           Nurse visit         Bhupinder Aspen DO

 

                    2011           Nurse visit         Bhupinder Aspen DO

 

                    10/20/2011          Nurse visit         Bhupinder Aspen DO

 

                    2011           Office visit        Bhupinder Aspen DO

 

                    2011           Nurse visit         Radha Ontiveros APRN

 

                    2011            Office visit        Bhupinder Aspen DO

 

                    2011           Nurse visit         Bhupinder Aspen DO

 

                    2011            Office visit        Bhupinder Louise DO

 

                    2011           Office visit        Bhupinder Louise DO

 

                    5/10/2011           Office visit        Bhupinder Louise DO

 

                    2011           Office visit        Bhupinder Louise DO

 

                    4/15/2011           Office visit        Devin Angel DO

 

                    2011           Office visit        Devin Angel DO

 

                    10/15/2010          Office visit        Devin Angel DO

 

                    2010           Office visit        Devin Angel DO

 

                    2010            Office visit        Devin Angel DO

 

                    2010           Office visit        Devin Angel DO

 

                    2010            Office visit        Devin Angel DO

 

                    3/8/2010            Office visit        Devin Masterson MD

 

                    2010            Surgery             Devin Masterson MD

 

                    2010            Office visit        Devin Angel DO

 

                    2010           Surgery             Devin Masterson MD

 

                    2010           Hospital            Devin Matserson MD

 

                    2010           [facility]            Devin Masterson MD

 

                    10/22/2009          Office visit        Devin Angel DO

## 2019-06-26 NOTE — XMS REPORT
MU2 Ambulatory Summary

                             Created on: 2017



Pauline Gan

External Reference #: [id number]

: 1950

Sex: Female



Demographics







                          Address                   [address]

[place]  [id number]

 

                          Home Phone                [phone]

 

                          Preferred Language        English

 

                          Marital Status            Legally 

 

                          Muslim Affiliation     Unknown

 

                          Race                      White

 

                          Ethnic Group              Not  or 





Author







                          Author                    Bhupinder Louise

 

                          [facility]

 

                          Address                   [address]

[place]  [id number]



 

                          Phone                     [phone]







Care Team Providers







                    Care Team Member Name    Role                Phone

 

                    Bhupinder Louise    PCP                 Unavailable

 

                    Bhupinder Louise    PreferredProvider    Unavailable







Allergies and Adverse Reactions







                    Name                Reaction            Notes

 

                    NO KNOWN DRUG ALLERGIES                         







Plan of Treatment







             Planned Activity    Comments     Planned Date    Planned Time    Plan/Goal

 

             Injection,Subcutaneous/Intramuscul, [facility]                 2017    12:00 AM      

 

             [facility]                       2017    12:00 AM      







Medications







                                        Active 

 

             Name         Start Date    Estimated Completion Date    SIG          Comments

 

                Latuda 20 mg oral tablet                                    take 1 tablet (20 mg) by oral route once daily with

 food (at least 350 calories)            

 

             pravastatin 40 mg oral tablet    3/30/2015                 TAKE 1 TABLET BY MOUTH DAILY     

 

                Namenda XR 28 mg oral capsule,sprinkle,ER 24hr    2015                       take 1 capsule (28

 mg) by oral route once daily            

 

                Namenda XR 28 mg oral capsule,sprinkle,ER 24hr    2016                       take 1 capsule (28

 mg) by oral route once daily            

 

                potassium chloride 10 mEq oral tablet extended release    2016                       take 1 tablet

 (10 meq) by oral route once daily       

 

             pravastatin 40 mg oral tablet    2016                 TAKE 1 TABLET BY MOUTH DAILY     

 

                Vitamin B-12 1,000 mcg/mL injection solution    2016                       inject 1 milliliter 

(1,000 mcg) by intramuscular route once a month     

 

                potassium chloride 10 mEq oral tablet extended release    2016                      take 1 tablet

 (10 meq) by oral route once daily       

 

                Namenda XR 28 mg oral capsule,sprinkle,ER 24hr    2016                      TAKE 1 CAPSULE BY

 MOUTH EVERY DAY                         

 

                furosemide 40 mg oral tablet    2016                      take 1 tablet (40 mg) by oral route

 once daily                              

 

                mirtazapine 45 mg oral tablet                                    take 1 tablet (45 mg) by oral route once daily

 before bedtime                          

 

             Fish Oil 300-1,000 mg oral capsule                              take 1 capsule by oral route daily     

 

             Vitamin D3 1,000 unit oral tablet                              take 1 tablet by oral route daily     

 

                Calcium 600 600 mg calcium (1,500 mg) oral tablet                                    take 1 tablet by oral route

 daily                                   

 

                Aspirin Low Dose 81 mg oral tablet,delayed release (DR/EC)                                    take 1 tablet 

(81 mg) by oral route once daily         

 

                metoclopramide HCl 10 mg oral tablet    2017                       take 1 tablet by oral route 

2 times a day for 50 days                

 

             furosemide 40 mg oral tablet    2017                 TAKE 1 TABLET BY MOUTH DAILY     

 

                Namenda XR 28 mg oral capsule,sprinkle,ER 24hr    2017                       TAKE 1 CAPSULE BY 

MOUTH EVERY DAY                          

 

                potassium chloride 10 mEq oral tablet extended release    2017                       TAKE 1 TABLET

 BY MOUTH DAILY                          

 

                Linzess 72 mcg oral capsule    2017                       take 1 capsule (72 mcg) by oral route

 once daily on an empty stomach at least 30 minutes before 1st meal of the day     



 

             pravastatin 40 mg oral tablet    2017                 TAKE 1 TABLET BY MOUTH DAILY     









                                         

 

             Name         Start Date    Expiration Date    SIG          Comments

 

             Reglan 10mg    3/29/2010    2010    one ac and hs     

 

                Keflex 500 mg oral capsule    2010       10/1/2010       take 1 capsule (500 mg) by oral

 route every 6 hours for 10 days         

 

                Bactrim -160 mg oral tablet    2011       take 1 tablet by oral route

 every 12 hours for 7 days               

 

                triamcinolone acetonide 0.1 % topical cream    2011      apply a thin

 layer to the affected area(s) by topical route 2 times per day     

 

                sertraline 100 mg oral tablet    4/10/2012       5/10/2012       take 1.5 tablets by oral route

 daily for 30 days                       

 

                ergocalciferol (vitamin D2) 50,000 unit oral capsule    4/15/2013       2013       TAKE

 ONE CAPSULE BY MOUTH ONCE A WEEK        

 

                CYANOCOBALAM 1000MCGINJ 1000 milliliter    2013       INJECT 1ML INTRAMUSCULAR

 ONCE A MONTH                            

 

                pravastatin 40 mg oral tablet    3/25/2014       3/20/2015       TAKE ONE TABLET BY MOUTH EVERY

 DAY                                     

 

                          Zostavax (PF) 19,400 unit/0.65 mL subcutaneous suspension for reconstitution    3/23/2015

                    3/24/2015           inject 0.65 milliliter by subcutaneous route once     

 

                famciclovir 500 mg oral tablet    12/3/2015       12/10/2015      take 1 tablet (500 mg) by

 oral route every 8 hours for 7 days     

 

                furosemide 40 mg oral tablet    2016      take 1 tablet (40 mg) by oral

 route once daily                        

 

                Cipro 500 mg oral tablet    2016       take 1 tablet (500 mg) by oral route

 2 times per day for 5 days              

 

                Bactrim -160 mg oral tablet    2016        take 1 tablet by oral route

 every 12 hours for 7 days               

 

                metoclopramide HCl 10 mg oral tablet    2017       take 1 tablet by oral

 route 2 times a day for 50 days         

 

                Macrobid 100 mg oral capsule    2017       take 1 capsule (100 mg) by oral

 route 2 times per day with food for 7 days     

 

                Augmentin 875-125 mg oral tablet    2017       take 1 tablet by oral route

 every 12 hours for 7 days               

 

                amoxicillin 500 mg oral tablet    2017       take 1 tablet (500 mg) by oral

 route every 12 hours for 7 days         

 

                cefuroxime axetil 500 mg oral tablet    2017       take 1 tablet (500 mg)

 by oral route 2 times per day for 7 days     

 

                ciprofloxacin HCl 500 mg oral tablet    2017       take 1 tablet (500 mg)

 by oral route every 12 hours for 5 days     









                                        Discontinued 

 

             Name         Start Date    Discontinued Date    SIG          Comments

 

                Tylenol 325 mg oral tablet                    2013        take 1 - 2 tablets (325 -650 mg) by oral

 route every 4-6 hours as needed         

 

                Calcium 600 + D(3) 600 mg(1,500mg) -400 unit oral tablet                    2011       take 1 tablet

 by oral route 2 times a day            no longer taking

 

                Vitamin B-12 1,000 mcg oral tablet extended release    2010       take 1

 tablet by oral route daily             no longer taking

 

                Antifungal (clotrimazole) 1 % topical cream    2010       apply to the 

affected and surrounding areas of skin by topical route 2 times per day morning 
and evening                              

 

                sertraline 100 mg oral tablet    5/10/2011       2011       take 2 tablets (200 mg) by 

oral route once daily                   discontinued by Dr. Serrano

 

                mirtazapine 15 mg oral tablet                    2011        take 1 tablet (15 mg) by oral route 

once daily before bedtime               Dr. Serrano

 

                mirtazapine 15 mg oral tablet                    2011        take 1 tablet (15 mg) by oral route 

once daily before bedtime               dc'd by Dr. Serrano

 

                Pristiq 50 mg oral tablet extended release 24 hr                    2013        take 1 tablet (50

 mg) by oral route once daily           Dr. Serrano

 

                Pristiq 50 mg oral tablet extended release 24 hr                    2013        take 1 tablet (50

 mg) by oral route once daily           dose updated

 

                Vitamin B-12 1,000 mcg/mL injection solution    2011        inject 1 milliliter

 (1,000 mcg) by intramuscular route once a month    on list already

 

                    syringe with needle 1 mL 25 gauge x 1" miscellaneous syringe    2011

                          use for injection once a month     

 

                clotrimazole 1 % topical cream    2011        apply to the affected and surrounding

 areas of skin by topical route 2 times per day in the morning and evening     

 

                Vitamin D2 50,000 unit oral capsule    2011        take 1 capsule (50,000

 unit) by oral route once weekly        generic on list

 

                Pravachol 40 mg oral tablet    2012        take 1 tablet (40 mg) by oral 

route once daily for 90 days            generic on list

 

                lithium carbonate 300 mg oral capsule    2012        take 1 capsule by oral

 route daily                            dose updated

 

                Pristiq 100 mg oral tablet extended release 24 hr                    4/10/2012       take 1 and 1/2 

tablet (150 mg) by oral route once daily    Mental Health provider

 

                Pristiq 100 mg oral tablet extended release 24 hr                    4/10/2012       take 1 and 1/2 

tablet (150 mg) by oral route once daily    Discontinued by Dr Efrain Knight at [facility]

 

                hydroxyzine HCl 50 mg oral tablet    10/16/2014      2015       take 1 tablet (50 mg) 

by oral route at bedtime                 

 

                lithium carbonate 300 mg oral capsule    2015       take 1 capsule (300

 mg) by oral route 2 for 30 days         

 

                fluconazole 100 mg oral tablet    2015       12/3/2015       take 1 tablet (100 mg) by 

oral route once a week                   

 

                ketoconazole 2 % topical cream    2015       12/3/2015       apply to the affected area(s)

 by topical route 2 times per day        

 

                prednisone 10 mg oral tablet    12/3/2015       2016        take 2 tablets (20 mg) by oral

 route once daily for 4 days 1 tablet daily for 4 days 0.5 tablet daily for 4 
days                                     

 

                triamcinolone acetonide 0.1 % topical cream    2016       apply a thin layer

 to the affected area(s) by topical route 2 times per day     

 

                Cipro 500 mg oral tablet    1/15/2017       2017       take 1 tablet (500 mg) by oral route

 every 12 hours for 10 days              







Problem List







                    Description         Status              Onset

 

                    Artificial opening status; colostomy    Active               

 

                    Bipolar disorder, unspecified    Active               

 

                    Hyperlipidemia      Active               

 

                    Peritoneal Neoplasm, Malignant    Active               

 

                    Anemia, Pernicious    Active               

 

                    Arthritis unspecified    Active               

 

                    B12 deficiency      Active               







Vital Signs







      Date    Time    BP-Sys(mm[Hg]    BP-Lynn(mm[Hg])    HR(bpm)    RR(rpm)    Temp    WT    HT    HC    BMI

                    BSA                 BMI Percentile      O2 Sat(%)

 

        2017    1:34:00 PM    118 mmHg    62 mmHg    122 bpm    18 rpm    97.8 F    161.375 lbs    

69 in                     23.83 kg/m2    1.89 m2                   96 %

 

        2017    3:05:00 PM    134 mmHg    70 mmHg    70 bpm    20 rpm    97.4 F    181 lbs    69 in

                          26.7288 kg/m    1.9992 m                 98 %

 

        2017    11:07:00 AM    124 mmHg    64 mmHg    62 bpm    17 rpm    98.2 F    181.2 lbs    69

 in                       26.76 kg/m2    2.00 m2                   98 %

 

        1/15/2017    3:34:00 PM    148 mmHg    89 mmHg    69 bpm    20 rpm    98.2 F    179 lbs    69 in

                          26.4334 kg/m    1.9882 m                 98 %

 

       2017    1:51:00 PM    160 mmHg    90 mmHg    100 bpm    20 rpm    96.5 F    179 lbs             

                                                                98 %

 

       2016    3:11:00 PM    134 mmHg    76 mmHg    80 bpm    20 rpm    98 F    163 lbs    69 in     

                24.0706 kg/m    1.8972 m                      98 %

 

        2016    2:04:00 PM    142 mmHg    86 mmHg    68 bpm    16 rpm    98.5 F    166 lbs    63 in

                          29.41 kg/m2    1.83 m2                   100 %

 

        2016    11:27:00 AM    148 mmHg    78 mmHg    90 bpm    20 rpm    98.2 F    153 lbs    69 in

                          22.5939 kg/m    1.8381 m                 96 %

 

        12/3/2015    9:50:00 AM    132 mmHg    70 mmHg    62 bpm    16 rpm    97.9 F    145 lbs    69 in

                          21.41 kg/m2    1.79 m2                   100 %

 

        2015    8:52:00 AM    132 mmHg    68 mmHg    52 bpm    20 rpm    97.8 F    141 lbs    69 in

                          20.8218 kg/m    1.7645 m                 100 %

 

        2015    3:25:00 PM    120 mmHg    62 mmHg    72 bpm    16 rpm    98.1 F    136 lbs    69 in

                          20.08 kg/m2    1.73 m2                   98 %

 

       3/23/2015    2:55:00 PM    130 mmHg    76 mmHg    68 bpm    18 rpm    97 F    140 lbs    69 in    

                20.6742 kg/m    1.7583 m                      98 %

 

        10/16/2014    11:11:00 AM    120 mmHg    66 mmHg    77 bpm    20 rpm    98 F    130 lbs    69 in

                          19.20 kg/m2    1.69 m2                   100 %

 

        2014    3:21:00 PM    130 mmHg    66 mmHg    63 bpm    18 rpm    97.2 F    160 lbs    69 in

                          23.6276 kg/m    1.8797 m                 99 %

 

        2013    10:35:00 AM    132 mmHg    70 mmHg    66 bpm    20 rpm    98.1 F    157 lbs    69 in

                          23.18 kg/m2    1.86 m2                    

 

        2013    1:29:00 PM    132 mmHg    70 mmHg    76 bpm    18 rpm    98.2 F    166 lbs    69 in 

                          24.5137 kg/m    1.9146 m                  

 

       2013    2:46:00 PM    128 mmHg    70 mmHg    76 bpm    16 rpm    98 F    160 lbs    69 in     

                23.63 kg/m2     1.88 m2                          

 

        2011    8:49:00 AM    128 mmHg    78 mmHg    70 bpm    18 rpm    97.9 F    164 lbs    69 in

                          24.2183 kg/m    1.903 m                  

 

     2011    1:31:00 PM    132 mmHg    68 mmHg    84 bpm         97 F    167 lbs                        

                                         

 

        2011    9:09:00 AM    128 mmHg    70 mmHg    72 bpm    18 rpm    98.2 F    163 lbs    64 in 

                          27.9786 kg/m    1.8272 m                  

 

       2011    10:01:00 AM    132 mmHg    70 mmHg    72 bpm    18 rpm    98.2 F    154 lbs             

                                                                 

 

       2011    2:47:00 PM    128 mmHg    70 mmHg    72 bpm    18 rpm    97.8 F    156 lbs             

                                                                 

 

       5/10/2011    3:16:00 PM    144 mmHg    80 mmHg    72 bpm    18 rpm    98.2 F    158 lbs             

                                                                 

 

        2011    10:11:00 AM    132 mmHg    70 mmHg    70 bpm    18 rpm    98.2 F    168 lbs    69 in

                          24.809 kg/m    1.9261 m                  

 

        4/15/2011    10:52:00 AM    110 mmHg    60 mmHg    75 bpm    16 rpm    97.5 F    172.375 lbs    

69 in                     25.46 kg/m2    1.95 m2                   100 %

 

        2011    11:43:00 AM    120 mmHg    82 mmHg    75 bpm    16 rpm    97.2 F    178.5 lbs    69

 in                       26.3596 kg/m    1.9854 m                 100 %

 

        10/15/2010    1:32:00 PM    120 mmHg    70 mmHg    80 bpm    18 rpm    96.6 F    177 lbs    69 in

                          26.14 kg/m2    1.98 m2                   100 %

 

        2010    3:50:00 PM    168 mmHg    100 mmHg    82 bpm    18 rpm    97.8 F    177.5 lbs    69

 in                       26.2119 kg/m    1.9798 m                 97 %

 

        2010    1:21:00 PM    140 mmHg    80 mmHg    59 bpm    16 rpm    97.6 F    173.25 lbs    69 

in                        25.58 kg/m2    1.96 m2                   100 %

 

        2010    3:02:00 PM    140 mmHg    80 mmHg    61 bpm    16 rpm    97.6 F    173.125 lbs    69

 in                       25.5658 kg/m    1.9553 m                 99 %

 

        2010    1:23:00 PM    130 mmHg    80 mmHg    66 bpm    16 rpm    96.8 F    173 lbs    69 in 

                          25.55 kg/m2    1.95 m2                   100 %

 

        2010    12:58:00 PM    130 mmHg    88 mmHg    75 bpm    16 rpm    98.4 F    172.25 lbs    69

 in                       25.4366 kg/m    1.9503 m                 100 %







Social History







                    Name                Description         Comments

 

                    denies alcohol use                         

 

                    denies smoking                           

 

                    Denies illicit substance abuse                         

 

                    retired                                 direct care

 

                    Single                                   

 

                    Exercises regularly                         

 

                    Attended some college                         

 

                    Tobacco             Never smoker         







History of Procedures







                    Date Ordered        Description         Order Status

 

                    2010 12:00 AM    COMPREHEN METABOLIC PANEL    Reviewed

 

                    2010 12:00 AM    COMPLETE CBC W/AUTO DIFF WBC    Reviewed

 

                    2010 12:00 AM    LIPID PANEL         Reviewed

 

                          2015 12:00 AM        B12 Injection, Up to 1000 Mcg NDC#0668-5963-71 [facility] 

                                        Reviewed

 

                    2011 12:00 AM    MAMMOGRAM SCREENING    Reviewed

 

                    2011 12:00 AM    CYTOPATH C/V THIN LAYER    Reviewed

 

                    2011 12:00 AM    B12 Injection 1 cc NDC#[address]-47    Reviewed

 

                    2015 12:00 AM    THER/PROPH/DIAG INJ SC/IM    Reviewed

 

                    2015 12:00 AM    B12 Injection, Up to 1000 Mcg NDC#0116-2496-25    Reviewed

 

                    2011 12:00 AM    THER/PROPH/DIAG INJ SC/IM    Reviewed

 

                    2011 12:00 AM    B12 Injection([place]) Ndc#9892-8662-27-    Reviewed

 

                    2015 12:00 AM    THER/PROPH/DIAG INJ SC/IM    Reviewed

 

                    2015 12:00 AM    B12 Injection, Up to 1000 Mcg NDC#0928-8056-00    Reviewed

 

                    10/20/2011 12:00 AM    THER/PROPH/DIAG INJ SC/IM    Reviewed

 

                    10/20/2011 12:00 AM    B12 Injection([place]) Ndc#2359-4923-54-    Reviewed

 

                    2016 12:00 AM    THER/PROPH/DIAG INJ SC/IM    Reviewed

 

                    2016 12:00 AM    B12 Injection, Up to 1000 Mcg NDC#4895-2991-24    Reviewed

 

                    3/14/2016 12:00 AM    VITAMIN B-12        Reviewed

 

                    3/15/2016 12:00 AM    THER/PROPH/DIAG INJ SC/IM    Reviewed

 

                    3/15/2016 12:00 AM    B12 Injection, Up to 1000 Mcg NDC#3786-0211-15    Reviewed

 

                    2011 12:00 AM    ***Immunization administration, Medicare flu    Reviewed

 

                    2011 12:00 AM    Fluzone ** MEDICARE Only **    Reviewed

 

                    2011 12:00 AM    THER/PROPH/DIAG INJ SC/IM    Reviewed

 

                    2011 12:00 AM    B12 Injection ([employer] [facility]) Ndc#4521-9450-34    Reviewed

 

                    2016 12:00 AM    B12 Injection, Up to 1000 Mcg NDC#1706-4362-07 [facility]    

Reviewed

 

                    2016 12:00 AM    TTE W/DOPPLER COMPLETE    Reviewed

 

                    2016 12:00 AM    EXTREMITY STUDY     Reviewed

 

                          2016 12:00 AM        B12 Injection, Up to 1000 Mcg NDC#4248-6006-93 [facility] 

                                        Reviewed

 

                    2016 12:00 AM    THER/PROPH/DIAG INJ SC/IM    Reviewed

 

                    2016 12:00 AM    B12 Injection, Up to 1000 Mcg NDC#5772-3437-01    Reviewed

 

                    2016 12:00 AM    THER/PROPH/DIAG INJ SC/IM    Reviewed

 

                    2012 12:00 AM    B12 Injection([place]) Ndc#3042-5657-35-    Reviewed

 

                    2016 12:00 AM    B12 Injection, Up to 1000 Mcg NDC#2408-6188-53    Reviewed

 

                    2016 12:00 AM    THER/PROPH/DIAG INJ SC/IM    Reviewed

 

                    2012 12:00 AM    THER/PROPH/DIAG INJ SC/IM    Reviewed

 

                    2012 12:00 AM    B12 Injection ([employer]) Ndc#5249-4170-91    Reviewed

 

                    2016 12:00 AM    THER/PROPH/DIAG INJ SC/IM    Reviewed

 

                    2016 12:00 AM    B12 Injection, Up to 1000 Mcg NDC#3460-8692-99    Reviewed

 

                    2016 12:00 AM    B12 Injection, Up to 1000 Mcg NDC#4508-5969-88    Reviewed

 

                    2016 12:00 AM    THER/PROPH/DIAG INJ SC/IM    Reviewed

 

                    2012 12:00 AM    THER/PROPH/DIAG INJ SC/IM    Reviewed

 

                    2012 12:00 AM    B12 Injection([place]ella) Ndc#6370-3649-05-    Reviewed

 

                    12/15/2016 12:00 AM    B12 Injection, Up to 1000 Mcg NDC#9916-9407-75    Reviewed

 

                    12/15/2016 12:00 AM    THER/PROPH/DIAG INJ SC/IM    Reviewed

 

                    2016 12:00 AM    URNLS DIP STICK/TABLET RGNT AUTO W/O MICROSCOPY    Reviewed

 

                    1/3/2017 12:00 AM    URNLS DIP STICK/TABLET RGNT AUTO W/O MICROSCOPY    Reviewed

 

                    2017 12:00 AM    URINE CULTURE/COLONY COUNT    Reviewed

 

                    2017 12:00 AM    Rocephin 1 gram Injection, [facility]    Reviewed

 

                    2017 12:00 AM    THERAPEUTIC PROPHYLACTIC/DX INJECTION SUBQ/IM    Reviewed

 

                    2017 12:00 AM    B12 1000mcg Injection, [facility]    Reviewed

 

                    5/3/2012 12:00 AM    THER/PROPH/DIAG INJ SC/IM    Reviewed

 

                    5/3/2012 12:00 AM    B12 Injection([facility]) Ndc#4025-8317-54-    Reviewed

 

                    2017 12:00 AM    THERAPEUTIC PROPHYLACTIC/DX INJECTION SUBQ/IM    Reviewed

 

                    2017 12:00 AM    B12 1000mcg Injection, [facility] Medicare    Reviewed

 

                    2017 12:00 AM    MRI BRAIN STEM W/O & W/DYE    Reviewed

 

                    2017 12:00 AM    VITAMIN B-12        Reviewed

 

                    2017 12:00 AM    Speech Therapy Consult    Reviewed

 

                    2017 12:00 AM    ASSAY OF CREATININE    Reviewed

 

                    2012 12:00 AM    IMMUNOTHERAPY INJECTIONS    Reviewed

 

                    2012 12:00 AM    B12 Injection([facility]) Ndc#5229-2615-60-    Reviewed

 

                    2017 12:00 AM    URINALYSIS AUTO W/SCOPE    Reviewed

 

                    2012 12:00 AM    THER/PROPH/DIAG INJ SC/IM    Reviewed

 

                    2012 12:00 AM    B12 Injection, Up to 1000 Mcg NDC#4364-5321-27    Reviewed

 

                    2017 12:00 AM    URINALYSIS AUTO W/SCOPE    Reviewed

 

                    2017 2:18 PM    URINALYSIS AUTO W/O SCOPE    Reviewed

 

                    2017 12:00 AM    URINE CULTURE/COLONY COUNT    Reviewed

 

                    2017 12:00 AM    B12 1000mcg Injection    Reviewed

 

                    2017 12:00 AM    URNLS DIP STICK/TABLET RGNT AUTO W/O MICROSCOPY    Returned

 

                    2012 12:00 AM    THER/PROPH/DIAG INJ SC/IM    Reviewed

 

                    2012 12:00 AM    B12 Injection, Up to 1000 Mcg NDC#2446-9034-52    Reviewed

 

                    2012 12:00 AM    THER/PROPH/DIAG INJ SC/IM    Reviewed

 

                    2012 12:00 AM    B12 Injection, Up to 1000 Mcg NDC#7515-3610-32    Reviewed

 

                    10/16/2012 12:00 AM    THER/PROPH/DIAG INJ SC/IM    Reviewed

 

                    10/16/2012 12:00 AM    B12 Injection, Up to 1000 Mcg NDC#9842-2398-84    Reviewed

 

                    2010 12:00 AM    COMPREHEN METABOLIC PANEL    Reviewed

 

                    2010 12:00 AM    COMPLETE CBC W/AUTO DIFF WBC    Reviewed

 

                    2010 12:00 AM    LIPID PANEL         Reviewed

 

                    2013 12:00 AM    Flu Injection 3 Years And Above NDC# [address]-73  [facility]    Reviewed



 

                    2013 12:00 AM    COMPLETE CBC W/AUTO DIFF WBC    Reviewed

 

                    2013 12:00 AM    ASSAY OF LITHIUM    Reviewed

 

                    2013 12:00 AM    METABOLIC PANEL TOTAL CA    Reviewed

 

                    4/3/2013 12:00 AM    THER/PROPH/DIAG INJ SC/IM    Reviewed

 

                    4/3/2013 12:00 AM    B12 Injection, Up to 1000 Mcg NDC#2877-9519-22    Reviewed

 

                    2013 12:00 AM    THER/PROPH/DIAG INJ SC/IM    Reviewed

 

                    2013 12:00 AM    B12 Injection, Up to 1000 Mcg NDC#0127-4845-99    Reviewed

 

                    2013 12:00 AM    THER/PROPH/DIAG INJ SC/IM    Reviewed

 

                    2013 12:00 AM    B12 Injection, Up to 1000 Mcg NDC#7321-8033-57    Reviewed

 

                    2013 12:00 AM    LIPID PANEL         Reviewed

 

                    2013 12:00 AM    VITAMIN D 25 HYDROXY    Reviewed

 

                    2013 12:00 AM    THER/PROPH/DIAG INJ SC/IM    Reviewed

 

                    2013 12:00 AM    B12 Injection, Up to 1000 Mcg NDC#9752-9258-61    Reviewed

 

                    2013 12:00 AM    THER/PROPH/DIAG INJ SC/IM    Reviewed

 

                    3/6/2014 12:00 AM    THER/PROPH/DIAG INJ SC/IM    Reviewed

 

                    2014 12:00 AM    THER/PROPH/DIAG INJ SC/IM    Reviewed

 

                    2014 12:00 AM    B12 Injection, Up to 1000 Mcg NDC#6615-3636-22    Reviewed

 

                    2010 12:00 AM    SKIN FUNGI CULTURE    Reviewed

 

                    10/9/2010 12:00 AM    COMPREHEN METABOLIC PANEL    Reviewed

 

                    10/9/2010 12:00 AM    LIPID PANEL         Reviewed

 

                    2010 12:00 AM    THER/PROPH/DIAG INJ SC/IM    Reviewed

 

                    2010 12:00 AM    B12 Injection Ndc#[address]-59 ([facility])    Reviewed

 

                    2010 12:00 AM    THER/PROPH/DIAG INJ SC/IM    Reviewed

 

                    2010 12:00 AM    Kenalog 40 Mg Im-Ndc#[address]-15 ([facility])    Reviewed

 

                    10/15/2010 12:00 AM    FLU VACCINE 3 YRS & > IM    Reviewed

 

                    10/15/2010 12:00 AM    Admin.Of M/C Cov.Vaccine-Flu Vacc.    Reviewed

 

                    1/15/2011 12:00 AM    COMPLETE CBC W/AUTO DIFF WBC    Reviewed

 

                    1/15/2011 12:00 AM    COMPREHEN METABOLIC PANEL    Reviewed

 

                    1/15/2011 12:00 AM    LIPID PANEL         Reviewed

 

                    2014 12:00 AM    MAMMOGRAM SCREENING    Reviewed

 

                    2014 12:00 AM    Screening mammography, bilateral    Reviewed

 

                    7/10/2014 12:00 AM    THER/PROPH/DIAG INJ SC/IM    Reviewed

 

                    7/10/2014 12:00 AM    B12 Injection, Up to 1000 Mcg NDC#0475-8327-89    Reviewed

 

                    2011 12:00 AM    COMPLETE CBC W/AUTO DIFF WBC    Reviewed

 

                    2011 12:00 AM    COMPREHEN METABOLIC PANEL    Reviewed

 

                    2011 12:00 AM    LIPID PANEL         Reviewed

 

                    2014 12:00 AM    B12 Injection, Up to 1000 Mcg NDC#7284-9304-94    Reviewed

 

                    10/19/2014 12:00 AM    MAMMOGRAM SCREENING    Reviewed

 

                    10/19/2014 12:00 AM    Screening mammography, bilateral    Reviewed

 

                    10/16/2014 12:00 AM    B12 Injection, Up to 1000 Mcg NDC#5210-5130-61    Reviewed

 

                    10/16/2014 12:00 AM    COMPLETE CBC W/AUTO DIFF WBC    Reviewed

 

                    10/16/2014 12:00 AM    COMPREHEN METABOLIC PANEL    Reviewed

 

                    10/16/2014 12:00 AM    IMMUNOASSAY TUMOR     Reviewed

 

                    10/16/2014 12:00 AM    LIPID PANEL         Reviewed

 

                    10/16/2014 12:00 AM    ASSAY OF LITHIUM    Reviewed

 

                    10/16/2014 12:00 AM    MAMMOGRAM SCREENING    Reviewed

 

                    2011 12:00 AM    ASSAY OF PARATHORMONE    Reviewed

 

                    2011 12:00 AM    VITAMIN D 25 HYDROXY    Reviewed

 

                    2011 12:00 AM    ASSAY OF LITHIUM    Reviewed

 

                    2011 12:00 AM    METABOLIC PANEL TOTAL CA    Reviewed

 

                    2011 12:00 AM    CT HEAD/BRAIN W/O & W/DYE    Reviewed

 

                    3/23/2015 12:00 AM    PNEUMOCOCCAL VACC 13 GLENDY IM    Reviewed

 

                    3/23/2015 12:00 AM    Vitamin B12 injection    Reviewed

 

                    2011 12:00 AM    ASSAY OF LITHIUM    Reviewed

 

                    2011 12:00 AM    B12 Injection Ndc#[address]-29  [place]    Reviewed

 

                    2015 12:00 AM    THER/PROPH/DIAG INJ SC/IM    Reviewed

 

                    2015 12:00 AM    B12 Injection, Up to 1000 Mcg NDC#2434-9027-72    Reviewed

 

                    2015 12:00 AM    COMPLETE CBC W/AUTO DIFF WBC    Reviewed

 

                    2015 12:00 AM    COMPREHEN METABOLIC PANEL    Reviewed

 

                    2015 12:00 AM    LIPID PANEL         Reviewed

 

                    2015 12:00 AM    ASSAY OF LITHIUM    Reviewed

 

                    2011 12:00 AM    VIT D 1 25-DIHYDROXY    Reviewed

 

                    2011 12:00 AM    VITAMIN B-12        Reviewed

 

                    2015 12:00 AM    B12 Injection, Up to 1000 Mcg NDC#0038-3075-02    Reviewed

 

                    2015 12:00 AM    THER/PROPH/DIAG INJ SC/IM    Reviewed

 

                    2015 12:00 AM    B12 Injection, Up to 1000 Mcg NDC#7702-0540-42    Reviewed

 

                    2011 12:00 AM    THER/PROPH/DIAG INJ SC/IM    Reviewed

 

                    2011 12:00 AM    B12 Injection ([employer] [facility]) Ndc#9085-9801-38    Reviewed

 

                    2015 12:00 AM    THER/PROPH/DIAG INJ SC/IM    Reviewed

 

                    2015 12:00 AM    B12 Injection, Up to 1000 Mcg NDC#6897-2674-59    Reviewed







Results Summary







                          Date and Description      Results

 

                          2004 12:00 AM        Colonoscopy-Women and Men over 50 Normal 

 

                          2008 12:00 AM         Pap Smear Declined 

 

                          10/7/2009 12:00 AM        Cholest Cry Stone Ql .0 %LDLc SerPl-mCnc 123.0 mg/dLHDLc

 SerPl-mCnc 34.0 mg/dLTrigl SerPl-mCnc 190.0 mg/dLGlucose SerPl-mCnc 78.0 mg/dL

 

                          2009 12:00 AM        Mammogram -Women over 40 Normal HIV1+2 Ab Ser Ql no risk 

 

                          2010 8:47 AM         Dexa Bone Scan Refused Aspirin reccommended Contraindication 



 

                          2010 8:48 AM         Depression Done 

 

                          2010 12:00 AM         Foot Exam-Diabetic Done 

 

                          2010 12:00 AM         Cholest Cry Stone Ql .0 %LDLc SerPl-mCnc 126.0 mg/dLGlucose

 SerPl-mCnc 102.0 mg/dL

 

                          2010 8:45 AM          TRIGLYCERIDES 122.0 mg/dLCHOLESTEROL 186.0 mg/dLHDL 36.0 mg/dLTOT

 CHOL/HDL 5.2 LDL (CALC) 126.0 mg/dLGLUCOSE 102.0 mg/dLSODIUM 143.0 
mmol/LPOTASSIUM 3.70 mmol/LCHLORIDE 111.0 mmol/LCO2 23.0 mmol/LBUN 10.0 
mg/dLCREATININE 0.80 mg/dLSGOT/AST 12.0 IU/LSGPT/ALT 11.0 IU/LALK PHOS 65.0 
IU/LTOTAL PROTEIN 7.20 g/dLALBUMIN 3.90 g/dLTOTAL BILI 0.50 mg/dLCALCIUM 10.20 
mg/dLAGE 59 GFR NonAA 73 GFR AA 88 eGFR >60 mL/min/1.73 m2eGFR AA* >60 WBC 5.7 
RBC 3.26 HGB 10.60 g/dLHCT 31.70 %MCV 97.0 fLMCH 32.50 pgMCHC 33.40 g/dLRDW SD 
47 RDW CV 13.30 %MPV 9.70 fLPLT 287 NRBC# 0.00 NRBC% 0.0 %NEUT 62.90 %%LYMP 
21.80 %%MONO 9.90 %%EOS 5.0 %%BASO 0.40 %#NEUT 3.56 #LYMP 1.23 #MONO 0.56 #EOS 
0.28 #BASO 0.02 MANUAL DIFF NOT IND 

 

                          2010 12:00 AM        Glucose SerPl-mCnc 96.0 mg/dLCholest Cry Stone Ql .0 %LDLc

 SerPl-mCnc 146.0 mg/dL

 

                          2010 8:26 AM         TRIGLYCERIDES 106.0 mg/dLCHOLESTEROL 199.0 mg/dLHDL 32.0 mg/dLTOT

 CHOL/HDL 6.2 LDL (CALC) 146.0 mg/dLGLUCOSE 96.0 mg/dLSODIUM 143.0 
mmol/LPOTASSIUM 4.0 mmol/LCHLORIDE 113.0 mmol/LCO2 24.0 mmol/LBUN 13.0 
mg/dLCREATININE 1.0 mg/dLSGOT/AST 11.0 IU/LSGPT/ALT 6.0 IU/LALK PHOS 56.0 
IU/LTOTAL PROTEIN 6.60 g/dLALBUMIN 3.80 g/dLTOTAL BILI 0.50 mg/dLCALCIUM 9.30 
mg/dLAGE 59 GFR NonAA 57 GFR AA 69 eGFR 57 eGFR AA* >60 

 

                          10/6/2010 12:00 AM        Cholest Cry Stone Ql .0 %LDLc SerPl-mCnc 111.0 mg/dLGlucose

 SerPl-mCnc 81.0 mg/dL

 

                          10/6/2010 2:45 PM         TRIGLYCERIDES 123.0 mg/dLCHOLESTEROL 178.0 mg/dLHDL 42.0 mg/dLTOT

 CHOL/HDL 4.2 LDL (CALC) 111.0 mg/dLGLUCOSE 81.0 mg/dLSODIUM 139.0 
mmol/LPOTASSIUM 4.10 mmol/LCHLORIDE 106.0 mmol/LCO2 24.0 mmol/LBUN 13.0 
mg/dLCREATININE 0.90 mg/dLSGOT/AST 13.0 IU/LSGPT/ALT 11.0 IU/LALK PHOS 61.0 
IU/LTOTAL PROTEIN 7.10 g/dLALBUMIN 3.90 g/dLTOTAL BILI 0.30 mg/dLCALCIUM 9.30 
mg/dLAGE 60 GFR NonAA 64 GFR AA 78 eGFR >60 mL/min/1.73 m2eGFR AA* >60 WBC 6.9 
RBC 3.59 HGB 11.50 g/dLHCT 35.30 %MCV 98.0 fLMCH 32.0 pgMCHC 32.60 g/dLRDW SD 46
 RDW CV 12.90 %MPV 9.90 fLPLT 311 NRBC# 0.00 NRBC% 0.0 %NEUT 64.90 %%LYMP 22.50 
%%MONO 7.20 %%EOS 5.10 %%BASO 0.30 %#NEUT 4.45 #LYMP 1.54 #MONO 0.49 #EOS 0.35 
#BASO 0.02 MANUAL DIFF NOT IND 

 

                          2011 12:00 AM         Mammogram -Women over 40 Ordered 

 

                          2011 10:25 AM        TRIGLYCERIDES 111.0 mg/dLCHOLESTEROL 195.0 mg/dLHDL 43.0 mg/dLTOT

 CHOL/HDL 4.5 LDL (CALC) 130.0 mg/dLWBC 5.3 RBC 3.76 HGB 12.0 g/dLHCT 37.80 %MCV
 101.0 fLMCH 31.90 pgMCHC 31.70 g/dLRDW SD 47 RDW CV 13.0 %MPV 9.70 fLPLT 259 
NRBC# 0.00 NRBC% 0.0 %NEUT 69.0 %%LYMP 17.60 %%MONO 8.30 %%EOS 4.70 %%BASO 0.40 
%#NEUT 3.63 #LYMP 0.93 #MONO 0.44 #EOS 0.25 #BASO 0.02 MANUAL DIFF NOT IND 
GLUCOSE 102.0 mg/dLSODIUM 146.0 mmol/LPOTASSIUM 4.20 mmol/LCHLORIDE 113.0 
mmol/LCO2 23.0 mmol/LBUN 15.0 mg/dLCREATININE 1.0 mg/dLSGOT/AST 12.0 
IU/LSGPT/ALT 17.0 IU/LALK PHOS 60.0 IU/LTOTAL PROTEIN 6.90 g/dLALBUMIN 4.20 
g/dLTOTAL BILI 0.40 mg/dLCALCIUM 9.70 mg/dLAGE 60 GFR NonAA 57 GFR AA 69 eGFR 57
 eGFR AA* >60 

 

                          2011 11:49 AM        Cholest Cry Stone Ql .0 %LDLc SerPl-mCnc 130.0 mg/dLHDLc

 SerPl-mCnc 43.0 mg/dLTrigl SerPl-mCnc 111.0 mg/dLGlucose SerPl-mCnc 102.0 mg/dL

 

                          2011 11:52 AM        Pap Smear Declined 

 

                          2011 11:28 AM        Lithium 2.080 mmol/LGLUCOSE 102.0 mg/dLSODIUM 135.0 mmol/LPOTASSIUM

 3.90 mmol/LCHLORIDE 106.0 mmol/LCO2 21.0 mmol/LBUN 12.0 mg/dLCREATININE 1.30 
mg/dLCALCIUM 10.70 mg/dLAGE 60 GFR NonAA 42 GFR AA 51 eGFR 42 eGFR AA* 51 

 

                          2011 8:58 AM          Lithium 0.690 mmol/L

 

                          2011 2:38 PM         VITAMIN B12 3483.0 pg/mL

 

                          2013 3:35 PM          WBC 5.1 RBC 3.73 HGB 11.70 g/dLHCT 36.40 %MCV 98.0 fLMCH 31.40

 pgMCHC 32.10 g/dLRDW SD 47 RDW CV 13.10 %MPV 9.80 fLPLT 224 NRBC# 0.00 NRBC% 
0.0 %NEUT 66.80 %%LYMP 19.10 %%MONO 9.0 %%EOS 4.90 %%BASO 0.20 %#NEUT 3.42 #LYMP
 0.98 #MONO 0.46 #EOS 0.25 #BASO 0.01 MANUAL DIFF NOT IND GLUCOSE 88.0 
mg/dLSODIUM 141.0 mmol/LPOTASSIUM 4.10 mmol/LCHLORIDE 110.0 mmol/LCO2 22.0 
mmol/LBUN 22.0 mg/dLCREATININE 1.10 mg/dLCALCIUM 9.80 mg/dLAGE 62 GFR NonAA 50 
GFR AA 61 eGFR 50 eGFR AA* 60 Lithium 0.760 mmol/L

 

                          2013 11:02 AM        TRIGLYCERIDES 106.0 mg/dLCHOLESTEROL 181.0 mg/dLHDL 46.0 mg/dLTOT

 CHOL/HDL 3.9 LDL (CALC) 114.0 mg/dLVITAMIN D 41.10 ng/mL

 

                          10/17/2014 10:10 AM       WBC 5.0 RBC 3.66 HGB 11.60 g/dLHCT 36.80 %.0 fLMCH 31.70

 pgMCHC 31.50 g/dLRDW SD 50 RDW CV 13.50 %MPV 10.10 fLPLT 209 NRBC# 0.00 NRBC% 
0.0 %NEUT 69.20 %%LYMP 21.0 %%MONO 6.40 %%EOS 3.20 %%BASO 0.20 %#NEUT 3.46 #LYMP
 1.05 #MONO 0.32 #EOS 0.16 #BASO 0.01 MANUAL DIFF NOT IND GLUCOSE 100.0 
mg/dLSODIUM 148.0 mmol/LPOTASSIUM 3.90 mmol/LCHLORIDE 114.0 mmol/LCO2 26.0 
mmol/LBUN 12.0 mg/dLCREATININE 1.20 mg/dLSGOT/AST 9.0 IU/LSGPT/ALT <6 IU/LALK 
PHOS 82.0 IU/LTOTAL PROTEIN 6.90 g/dLALBUMIN 4.0 g/dLTOTAL BILI 0.40 
mg/dLCALCIUM 10.50 mg/dLAGE 64 GFR NonAA 45 GFR AA 55 eGFR 45 eGFR AA* 55 
TRIGLYCERIDES 96.0 mg/dLCHOLESTEROL 155.0 mg/dLHDL 38.0 mg/dLTOT CHOL/HDL 4.1 
LDL (CALC) 98.0 mg/dLLithium 0.850 mmol/LCancer Antigen (CA) 125 8.30 U/mL

 

                          2015 10:25 AM        Lithium 0.790 mmol/LWBC 4.8 RBC 3.44 HGB 11.0 g/dLHCT 35.20 

%.0 fLMCH 32.0 pgMCHC 31.30 g/dLRDW SD 53 RDW CV 14.0 %MPV 9.30 fLPLT 210
 NRBC# 0.00 NRBC% 0.0 %NEUT 70.80 %%LYMP 17.20 %%MONO 8.10 %%EOS 3.50 %%BASO 
0.40 %#NEUT 3.41 #LYMP 0.83 #MONO 0.39 #EOS 0.17 #BASO 0.02 MANUAL DIFF NOT IND 
TRIGLYCERIDES 107.0 mg/dLCHOLESTEROL 174.0 mg/dLHDL 43.0 mg/dLTOT CHOL/HDL 4.0 
LDL (CALC) 110.0 mg/dLGLUCOSE 90.0 mg/dLSODIUM 145.0 mmol/LPOTASSIUM 3.80 
mmol/LCHLORIDE 115.0 mmol/LCO2 24.0 mmol/LBUN 17.0 mg/dLCREATININE 1.30 
mg/dLSGOT/AST 18.0 IU/LSGPT/ALT 17.0 IU/LALK PHOS 56.0 IU/LTOTAL PROTEIN 6.70 
g/dLALBUMIN 3.90 g/dLTOTAL BILI 0.40 mg/dLCALCIUM 9.80 mg/dLAGE 64 GFR NonAA 41 
GFR AA 50 eGFR 41 eGFR AA* 50 

 

                          2015 8:50 AM        WBC 5.8 RBC 3.29 HGB 10.70 g/dLHCT 34.0 %.0 fLMCH 32.50

 pgMCHC 31.50 g/dLRDW SD 52 RDW CV 13.60 %MPV 9.60 fLPLT 223 NRBC# 0.00 NRBC% 
0.0 %NEUT 69.60 %%LYMP 18.90 %%MONO 8.50 %%EOS 2.80 %%BASO 0.20 %#NEUT 4.03 
#LYMP 1.09 #MONO 0.49 #EOS 0.16 #BASO 0.01 MANUAL DIFF NOT IND Lithium 0.620 
mmol/LGLUCOSE 83.0 mg/dLSODIUM 139.0 mmol/LPOTASSIUM 3.90 mmol/LCHLORIDE 109.0 
mmol/LCO2 22.0 mmol/LBUN 19.0 mg/dLCREATININE 1.40 mg/dLSGOT/AST 19.0 
IU/LSGPT/ALT 21.0 IU/LALK PHOS 55.0 IU/LTOTAL PROTEIN 6.50 g/dLALBUMIN 3.90 
g/dLTOTAL BILI 0.50 mg/dLCALCIUM 9.60 mg/dLAGE 65 GFR NonAA 38 GFR AA 46 eGFR 38
 eGFR AA* 46 TRIGLYCERIDES 121.0 mg/dLCHOLESTEROL 192.0 mg/dLHDL 51.0 mg/dLTOT 
CHOL/HDL 3.8 .0 mg/dLFREE T4 0.79 TSH 1.210 uIU/mLHemoglobin A1c 5.40 
%Estim. Avg Glu (eAG) 108 

 

                          3/15/2016 8:08 AM         VITAMIN B12 696.0 pg/mL

 

                          3/23/2016 8:26 AM         WBC 7.0 RBC 3.61 HGB 11.80 g/dLHCT 37.70 %.0 fLMCH 32.70

 [facility] g/d[place]DW SD 49 RDW CV 12.50 %MPV 10.0 fLPLT 207 NRBC# 0.00 NRBC% 
0.0 %NEUT 73.60 %%LYMP 16.40 %%MONO 6.60 %%EOS 3.0 %%BASO 0.30 %#NEUT 5.15 #LYMP
 1.15 #MONO 0.46 #EOS 0.21 #BASO 0.02 MANUAL DIFF NOT IND Lithium 0.940 
mmol/LGLUCOSE 108.0 mg/dLSODIUM 143.0 mmol/LPOTASSIUM 4.30 mmol/LCHLORIDE 110.0 
mmol/LCO2 27.0 mmol/LBUN 16.0 mg/dLCREATININE 1.60 mg/dLSGOT/AST 13.0 
IU/LSGPT/ALT 7.0 IU/LALK PHOS 71.0 IU/LTOTAL PROTEIN 6.80 g/dLALBUMIN 4.0 
g/dLTOTAL BILI 0.20 mg/dLCALCIUM 10.40 mg/dLAGE 65 GFR NonAA 32 GFR AA 39 eGFR 
32 eGFR AA* 39 TRIGLYCERIDES 113.0 mg/dLCHOLESTEROL 169.0 mg/dLHDL 42.0 mg/dLTOT
 CHOL/HDL 4.0 LDL (CALC) 104.0 mg/dLFREE T4 0.86 TSH 2.20 uIU/mLHemoglobin A1c 
5.20 %Estim. Avg Glu (eAG) 103 

 

                          3/25/2016 9:17 AM         COLOR YELLOW APPEARANCE CLEAR SPEC GRAV 1.010 pH 7.0 PROTEIN 

NEGATIVE GLUCOSE NEGATIVE mg/dLKETONE NEGATIVE BILIRUBIN NEGATIVE BLOOD NEGATIVE
 NITRITE NEGATIVE [place] SCREEN SMALL MICRO IND? SEE BELOW WBC/HPF 0-5 RBC/HPF 
NEGATIVE CASTS/LPF NEGATIVE /LPFCRYSTALS NEGATIVE MUCOUS THRDS NEGATIVE BACTERIA
 NEGATIVE EPITH CELLS FEW SQUAMOUS /HPFTRICHOMONAS NEGATIVE YEAST NEGATIVE 

 

                          2016 6:00 AM        GLUCOSE 91.0 mg/dLSODIUM 143.0 mmol/LPOTASSIUM 3.60 mmol/LCHLORIDE

 112.0 mmol/LCO2 23.0 mmol/LBUN 22.0 mg/dLCREATININE 1.20 mg/dLSGOT/AST 15.0 
IU/LSGPT/ALT 12.0 IU/LALK PHOS 61.0 IU/LTOTAL PROTEIN 5.40 g/dLALBUMIN 3.10 
g/dLTOTAL BILI 0.40 mg/dLCALCIUM 8.40 mg/dLAGE 66 GFR NonAA 45 GFR AA 55 eGFR 45
 eGFR AA* 55 WBC 3.0 RBC 3.05 HGB 9.80 g/dLHCT 32.10 %.0 fLMCH 32.10 
pgMCHC 30.50 g/dLRDW SD 54 RDW CV 14.20 %MPV 10.10 fLPLT 170 NRBC# 0.00 NRBC% 
0.0 %NEUT 50.70 %%LYMP 32.60 %%MONO 10.50 %%EOS 5.90 %%BASO 0.30 %#NEUT 1.54 
#LYMP 0.99 #MONO 0.32 #EOS 0.18 #BASO 0.01 MANUAL DIFF NOT IND 

 

                          2016 2:09 PM        COLOR YELLOW APPEARANCE CLEAR SPEC GRAV 1.010 pH 5.0 PROTEIN

 30 GLUCOSE NEGATIVE mg/dLKETONE NEGATIVE BILIRUBIN NEGATIVE BLOOD LARGE NITRITE
 NEGATIVE [place] SCREEN MODERATE MICRO INDICATED? SEE BELOW WBC/HPF  RBC/HPF
 20-50 CASTS/LPF NEGATIVE /LPFCRYSTALS NEGATIVE MUCOUS THRDS NEGATIVE BACTERIA 
NEGATIVE EPITH CELLS FEW SQUAMOUS /HPFTRICHOMONAS NEGATIVE YEAST NEGATIVE CULT 
SET UP? YES 

 

                          1/3/2017 4:08 PM          COLOR YELLOW APPEARANCE HAZY SPEC GRAV 1.015 pH 6.0 PROTEIN 30

 GLUCOSE NEGATIVE mg/dLKETONE NEGATIVE BILIRUBIN NEGATIVE BLOOD MODERATE NITRITE
 NEGATIVE [place] SCREEN LARGE MICRO INDICATED? SEE BELOW WBC/-200 RBC/HPF 
5-10 CASTS/LPF NEGATIVE /LPFCRYSTALS NEGATIVE MUCOUS THRDS NEGATIVE BACTERIA 
NEGATIVE EPITH CELLS 1+ SQUAMOUS /HPFTRICHOMONAS NEGATIVE YEAST NEGATIVE CULT 
SET UP? YES 

 

                          2017 4:24 PM         CREATININE 1.50 mg/dLAGE 66 GFR NonAA 35 GFR AA 42 eGFR 35 eGFR

 AA* 42 VITAMIN B12 1324.0 pg/mL

 

                          2017 11:30 AM         GLUCOSE 93.0 mg/dLSODIUM 143.0 mmol/LPOTASSIUM 3.10 mmol/LCHLORIDE

 101.0 mmol/LCO2 29.0 mmol/LBUN 26.0 mg/dLCREATININE 1.50 mg/dLSGOT/AST 23.0 
IU/LSGPT/ALT 13.0 IU/LALK PHOS 66.0 IU/LTOTAL PROTEIN 7.70 g/dLALBUMIN 4.30 
g/dLTOTAL BILI 0.40 mg/dLCALCIUM 10.30 mg/dLAGE 66 GFR NonAA 35 GFR AA 42 eGFR 
35 eGFR AA* 42 TRIGLYCERIDES 147.0 mg/dLCHOLESTEROL 184.0 mg/dLHDL 44.0 mg/dLTOT
 CHOL/HDL 4.2 .0 mg/dLWBC 5.4 RBC 3.98 HGB 12.90 g/dLHCT 40.20 %.0
 fLMCH 32.40 pgMCHC 32.10 g/dLRDW SD 50 RDW CV 13.50 %MPV 9.30 fLPLT 210 NRBC# 
0.00 NRBC% 0.0 %NEUT 54.20 %%LYMP 30.70 %%MONO 9.10 %%EOS 5.20 %%BASO 0.40 
%#NEUT 2.94 #LYMP 1.66 #MONO 0.49 #EOS 0.28 #BASO 0.02 MANUAL DIFF NOT IND FREE 
T4 1.09 COLOR YELLOW APPEARANCE CLEAR SPEC GRAV <=1.005 pH 5.5 PROTEIN NEGATIVE 
GLUCOSE NEGATIVE mg/dLKETONE NEGATIVE BILIRUBIN NEGATIVE BLOOD NEGATIVE NITRITE 
NEGATIVE [place] SCREEN LARGE MICRO INDICATED? SEE BELOW WBC/HPF 10-20 RBC/HPF 0-5 
CASTS/LPF NEGATIVE /LPFCRYSTALS NEGATIVE MUCOUS THRDS NEGATIVE BACTERIA FEW 
EPITH CELLS 1+ SQUAMOUS /HPFTRICHOMONAS NEGATIVE YEAST NEGATIVE CULT SET UP? YES
 TSH 1.820 uIU/mL

 

                          2017 2:45 PM         COLOR YELLOW APPEARANCE CLEAR SPEC GRAV <=1.005 pH 6.0 PROTEIN

 NEGATIVE GLUCOSE NEGATIVE mg/dLKETONE NEGATIVE BILIRUBIN NEGATIVE BLOOD TRACE-
INTACT NITRITE NEGATIVE [place] SCREEN SMALL MICRO INDICATED? SEE BELOW WBC/HPF 0-5
 RBC/HPF NEGATIVE CASTS/LPF NEGATIVE /LPFCRYSTALS NEGATIVE MUCOUS THRDS NEGATIVE
 BACTERIA FEW EPITH CELLS FEW SQUAMOUS /HPFTRICHOMONAS NEGATIVE YEAST NEGATIVE 
CULT SET UP? YES 

 

                          2017 11:22 AM        COLOR YELLOW APPEARANCE CLEAR SPEC GRAV <=1.005 pH 6.5 PROTEIN

 NEGATIVE GLUCOSE NEGATIVE mg/dLKETONE NEGATIVE BILIRUBIN NEGATIVE BLOOD 
NEGATIVE NITRITE NEGATIVE LEUK SCREEN NEGATIVE MICRO INDICATED? NOT INDICATED 

 

                          2017 2:18 PM         Clarity Ur cloudy Color Ur dark yellow Glucose Ur-sCnc negative

 Bilirub Ur Ql Strip negative Ketones Ur Ql Strip negative Sp Gr Ur Qn 1.010 Hgb
 Ur Ql Strip trace-intact pH Ur-LsCnc 6.5 Prot Ur Ql Strip trace Urobilinogen 
Ur-mCnc 0.2 Nitrite Ur Ql Strip negative WBC Est Ur Ql Strip large 







History Of Immunizations







       Name    Date Admin    Mfg Name    Mfg Code    Trade Name    Lot#    Route    Inj    Vis Given    Vis

 Pub                                    CVX

 

        Influenza    2008    Not Entered    NE      Not Entered            Not Entered    Not Entered

                    1            999

 

        X       12/19/2008    [employer]    MSD     Pneumovax 23            Intramuscular    Not Entered

                    1            999

 

           Influenza    10/15/2010    [employer]    NOV        Fluvirin > 12 Years    

[id number]     Intramuscular    Left Deltoid    10/15/2010    2009    999

 

          X         3/23/2015    [facility]    [facility]       Prevnar 13    [id number]    Intramuscular

                Right Gluteous Medius    3/23/2015       2013       109







History of Past Illness







                    Name                Date of Onset       Comments

 

                    Peritoneal Neoplasm, Malignant                         

 

                    Hyperlipidemia                           

 

                    Bipolar disorder, unspecified                         

 

                    Artificial opening status; colostomy                         

 

                    B12 deficiency                           

 

                    Anemia, Pernicious                         

 

                    Arthritis unspecified                         

 

                    cervical cancer                          

 

                    Artificial opening status; colostomy    2010  1:10PM     

 

                    Bipolar disorder, unspecified    2010  1:10PM     

 

                    Hyperlipidemia      2010  1:10PM     

 

                    Anemia, Pernicious    2010  1:10PM     

 

                    Postoperative Follow-Up    2010  1:55PM     

 

                    Postoperative Follow-Up    Mar  8 2010 10:57AM     

 

                    Artificial opening status; colostomy    Mar  8 2010  1:19PM     

 

                    Peritoneal Neoplasm, Malignant    Mar  8 2010  1:19PM     

 

                    Artificial opening status; colostomy    2010  1:40PM     

 

                    Hyperlipidemia      2010  1:40PM     

 

                    Anemia, Pernicious    2010  1:40PM     

 

                    Peritoneal Neoplasm, Malignant    2010  1:40PM     

 

                    Arthritis unspecified    2010  1:40PM     

 

                    Anemia of Chronic Illness    2010  1:40PM     

 

                    Tinea corporis      2010  3:17PM     

 

                    Bipolar disorder, unspecified    2010  1:33PM     

 

                    Hyperlipidemia      2010  1:33PM     

 

                    Anemia, Pernicious    2010  1:33PM     

 

                    Peritoneal Neoplasm, Malignant    2010  1:33PM     

 

                    B12 deficiency      2010  1:33PM     

 

                    Ethmoidal Sinusitis, Acute    Sep 21 2010  3:53PM     

 

                    Wheezing            Sep 21 2010  3:53PM     

 

                    Flu                 Oct 15 2010  1:40PM     

 

                    Bipolar disorder, unspecified    Oct 15 2010  1:42PM     

 

                    Hyperlipidemia      Oct 15 2010  1:42PM     

 

                    Anemia, Pernicious    Oct 15 2010  1:42PM     

 

                    Peritoneal Neoplasm, Malignant    Oct 15 2010  1:42PM     

 

                    Bipolar disorder, unspecified    2011 12:01PM     

 

                    Hyperlipidemia      2011 12:01PM     

 

                    Anemia, Pernicious    2011 12:01PM     

 

                    Peritoneal Neoplasm, Malignant    2011 12:01PM     

 

                    Bipolar disorder, unspecified    Apr 15 2011 10:55AM     

 

                    Major Depression    2011 10:11AM     

 

                    Bipolar Disorder    2011 10:11AM     

 

                    Cancer              May 10 2011  4:16PM     

 

                    Major Depression    May 10 2011  3:16PM     

 

                    Bipolar Disorder    May 10 2011  3:16PM     

 

                    Hypercalcemia       May 23 2011  2:47PM     

 

                    Bipolar disorder, unspecified    May 23 2011  2:47PM     

 

                    Colon Cancer, Personal History    May 23 2011  2:47PM     

 

                    Bipolar Disorder    May 31 2011  4:39PM     

 

                    Depressive Disorder    2011 10:01AM     

 

                    Vitamin B12 deficiency    2011 10:01AM     

 

                    Vitamin D Deficiency    2011  5:07PM     

 

                    Anemia, Vitamin B12 Deficiency    2011  5:07PM     

 

                    B12 deficiency      2011  3:56PM     

 

                    Routine gynecological examination    Aug  4 2011  9:08AM     

 

                    Screening Examination for Breast Cancer    Aug  4 2011  9:08AM     

 

                    Tinea Corporis      Aug  4 2011  9:08AM     

 

                    Depressive Disorder    Sep 23 2011  8:47AM     

 

                    Contact Dermatitis    Sep 23 2011  8:47AM     

 

                    Anemia, Pernicious    Sep 23 2011  8:47AM     

 

                    B12 deficiency      Sep 23 2011  8:47AM     

 

                    B12 deficiency      Sep 27 2011  2:58PM     

 

                    B12 deficiency      Oct 20 2011  2:34PM     

 

                    Flu                 Dec  9 2011  3:16PM     

 

                    B12 deficiency      Dec  9 2011  3:17PM     

 

                    B12 deficiency      2012  4:52PM     

 

                    B12 deficiency      2012 11:10AM     

 

                    B12 deficiency      2012  3:37PM     

 

                    B12 deficiency      May  3 2012  4:10PM     

 

                    B12 deficiency      2012  2:54PM     

 

                    B12 deficiency      2012 11:23AM     

 

                    B12 deficiency      Aug  9 2012  2:08PM     

 

                    B12 deficiency      Sep  6 2012  4:36PM     

 

                    B12 deficiency      Oct 16 2012 10:23AM     

 

                    Flu                 Feb  4   3:11PM     

 

                    Bipolar disorder, unspecified    Feb  4   2:48PM     

 

                    Anemia, Pernicious    Feb  4   2:48PM     

 

                    B12 deficiency      Feb  4   2:48PM     

 

                    Extrapyramidal abnormal movement disorder    Feb  4   2:48PM     

 

                    B12 deficiency      Apr  3 2013 12:03PM     

 

                    Bipolar disorder, unspecified    May  7 2013  1:31PM     

 

                    Anemia, Pernicious    May  7 2013  1:31PM     

 

                    B12 deficiency      May  7 2013  1:31PM     

 

                    Extrapyramidal abnormal movement disorder    May  7 2013  1:31PM     

 

                    B12 deficiency      2013  3:42PM     

 

                    B12 deficiency      2013  1:31PM     

 

                    Hyperlipidemia      Aug  7 2013 10:37AM     

 

                    Vitamin D Deficiency    Aug  7 2013 10:37AM     

 

                    Bipolar disorder, unspecified    Aug  7 2013 10:37AM     

 

                    Anemia, Pernicious    Aug  7 2013 10:37AM     

 

                    B12 deficiency      Aug  7 2013 10:37AM     

 

                    B12 deficiency      Sep 25 2013 11:15AM     

 

                    B12 deficiency      Dec 11 2013  3:16PM     

 

                    B12 deficiency      Mar  6 2014  1:48PM     

 

                    B12 deficiency      May 21 2014  3:17PM     

 

                    Screening Examination for Breast Cancer    2014  3:23PM     

 

                    Periumbilical abdominal pain    2014  3:23PM     

 

                    B12 deficiency      Jul 10 2014  2:52PM     

 

                    Anemia, Vitamin B12 Deficiency    Aug 13 2014  4:50PM     

 

                    Bipolar disorder    Oct 16 2014 11:13AM     

 

                    Hyperlipidemia      Oct 16 2014 11:13AM     

 

                    Anemia, Pernicious    Oct 16 2014 11:13AM     

 

                    Peritoneal Neoplasm, Malignant    Oct 16 2014 11:13AM     

 

                    Screening breast examination    Oct 16 2014 11:13AM     

 

                    Weight loss         Oct 16 2014 11:13AM     

 

                    Anemia, Pernicious    Mar 23 2015  2:57PM     

 

                    B12 deficiency      Mar 23 2015  2:57PM     

 

                    Need for Prevnar vaccine    Mar 23 2015  2:57PM     

 

                    Bipolar disorder    Mar 23 2015  2:57PM     

 

                    Hyperlipidemia      Mar 23 2015  2:57PM     

 

                    Anemia, Pernicious    Mar 23 2015  2:57PM     

 

                    Peritoneal Neoplasm, Malignant    Mar 23 2015  2:57PM     

 

                    B12 deficiency      May  4 2015  4:48PM     

 

                    Hyperlipidemia      May 13 2015  9:56AM     

 

                    Anemia              May 13 2015  9:56AM     

 

                    Bipolar disorder    May 13 2015  9:56AM     

 

                    Bipolar disorder    May 14 2015  3:27PM     

 

                    Hyperlipidemia      May 14 2015  3:27PM     

 

                    Anemia, Pernicious    May 14 2015  3:27PM     

 

                    Peritoneal Neoplasm, Malignant    May 14 2015  3:27PM     

 

                    B12 deficiency      2015  2:20PM     

 

                    B12 deficiency      2015 11:34AM     

 

                    B12 deficiency      Aug 18 2015  9:06AM     

 

                    Tinea Corporis      Sep 18 2015  8:54AM     

 

                    B12 deficiency      Sep 18 2015  8:54AM     

 

                    B12 deficiency      2015 10:28AM     

 

                    Herpes zoster without complication    Dec  3 2015  9:52AM     

 

                    B12 deficiency      Dec 23 2015 11:21AM     

 

                    B12 deficiency      2016  4:51PM     

 

                    Vitamin B 12 deficiency    Mar 14 2016  5:35PM     

 

                    B12 deficiency      Mar 15 2016 12:14PM     

 

                    B12 deficiency      May  5 2016 11:30AM     

 

                    Edema               May  5 2016 11:30AM     

 

                    Dermatitis          May  5 2016 11:30AM     

 

                    Edema               May 17 2016  8:38AM     

 

                    Shortness of breath    May 17 2016  8:38AM     

 

                    Bilateral edema of lower extremity    2016  2:06PM     

 

                    B12 deficiency      2016  2:06PM     

 

                    B12 deficiency      2016 11:50AM     

 

                    B12 deficiency      2016 11:20AM     

 

                    Diarrhea            Aug  2 2016  3:13PM     

 

                    B12 deficiency      Aug 24 2016 11:10AM     

 

                    Encounter for screening mammogram for breast cancer    Aug 24 2016 11:44AM     

 

                    B12 deficiency      Sep 28 2016  2:35PM     

 

                    B12 deficiency      Dec 15 2016  2:02PM     

 

                    Dysuria             Dec 29 2016 12:14PM     

 

                    Hematuria           Fidencio  3 2017  1:33PM     

 

                    Constipation by delayed colonic transit    2017  1:52PM     

 

                    Ileus               2017  1:52PM     

 

                    UTI (urinary tract infection)    Fidencio 15 2017  3:39PM     

 

                    Acute cystitis with hematuria    2017 11:07AM     

 

                    B12 deficiency      2017 11:07AM     

 

                    B12 deficiency      2017 11:40AM     

 

                    B12 deficiency      2017  4:07PM     

 

                    Slurred speech      2017  3:07PM     

 

                    Vitamin B12 deficiency    2017  3:07PM     

 

                    Dysphagia, unspecified type    2017  3:07PM     

 

                    Hyperlipidemia      2017  3:07PM     

 

                    Dysuria             2017 12:01PM     

 

                    B12 deficiency      2017  2:08PM     

 

                    Dysuria             2017 10:58AM     

 

                    Hematuria           May 22 2017  1:36PM     

 

                    Depressive Disorder    May 22 2017  1:36PM     

 

                    Constipation        May 22 2017  1:36PM     

 

                    Fatigue             May 22 2017  1:36PM     

 

                    Urinary tract infection    May 30 2017  9:36AM     

 

                    Hypokalemia         May 30 2017 12:03PM     







Payers







           Insurance Name    Company Name    Plan Name    Plan Number    Policy Number    Policy Group

 Number                                 Start Date

 

                    [facility]    [facility]              [id number]              N/A

 

                          [employer]    [employer]                 [id number]

                                                    

 

                    Medicare Part A    Medicare - Lab/Xray              [id number]              2006

 

                    Medicare Part B    [employer] [place]              [id number]              2006

 

                          [employer]    [employer]                 50 percent

                                                    2009

 

                    [facility]              [id number]              N/A

 

                    Medicare Part A    Medicare Part A              [id number]              N/A

 

                    Medicare Part A    Medicare Part A              [id number]              2006









History of Encounters







                    Visit Date          Visit Type          Provider

 

                    2017           Office visit        Bhupinder Aspen DO

 

                    2017           Nurse visit         Bhupinder Aspen DO

 

                    2017           Office visit         

 

                    2017           Office visit        Bhupinder Aspen DO

 

                    2017           Nurse visit         Bhupinder Aspen DO

 

                    2017           Office visit        Radha Ontiveros APRN

 

                    1/15/2017           Office visit        Aj Tapia NP

 

                    2017            Office visit        Devin Masterson MD

 

                    2016          [facility]            Devin Masterson MD

 

                    12/15/2016          Nurse visit         Bhupinder Aspen DO

 

                    2016           Nurse visit         Bret Forte APRN

 

                    2016           Nurse visit         Bhupinder Aspen DO

 

                    2016            Office visit        Bhupinder Aspen DO

 

                    2016           Nurse visit         Bhupinder Aspen DO

 

                    2016           Office visit        Bret Forte APRN

 

                    2016            Office visit        Bhupinder Aspen DO

 

                    3/15/2016           Nurse visit         Bhupinder Aspen DO

 

                    2016            Nurse visit         Bhupinder Aspen DO

 

                    2015          Nurse visit         Bhupinder Aspen DO

 

                    12/3/2015           Office visit        Bhupinder Aspen DO

 

                    2015          Nurse visit         Bhupinder Aspen DO

 

                    2015           Office visit        Bhupinder Aspen DO

 

                    2015           Nurse visit         Bhupinder Aspen DO

 

                    2015            Nurse visit         Bhupinder Aspen DO

 

                    2015            Nurse visit         Bhupinder Aspen DO

 

                    2015           Office visit        Bhupinder Aspen DO

 

                    2015            Nurse visit         Bhupinder Aspen DO

 

                    3/23/2015           Office visit        Bhupinder Aspen DO

 

                    10/16/2014          Office visit        Bhupinder Aspen DO

 

                    2014           Nurse visit         Radha Ontiveros APRN

 

                    7/10/2014           Nurse visit         Bhupinder Aspen DO

 

                    2014           Office visit        Bhupinder Aspen DO

 

                    2014           Nurse visit         Bhupinder Aspen DO

 

                    3/6/2014            Nurse visit         Bhupinder Aspen DO

 

                    2014            [facility]            EARNEST Lopez MD

 

                    2013          Nurse visit         Bhupinder Aspen DO

 

                    2013           Nurse visit         Bhupinder Aspen DO

 

                    2013            Office visit        Bhupinder Aspen DO

 

                    2013            Nurse visit         Bhupinder Aspen DO

 

                    2013            Nurse visit         Bhupinder Aspen DO

 

                    2013            Office visit        Bhupinder Aspen DO

 

                    4/3/2013            Nurse visit         Bhupinder Aspen DO

 

                    2013            Office visit        Bhupinder Aspen DO

 

                    10/16/2012          Nurse visit         Bhupinder Aspen DO

 

                    2012            Nurse visit         Bhupinder Aspen DO

 

                    2012            Voided              Bhupinder Aspen DO

 

                    2012            Nurse visit         Bhupinder Aspen DO

 

                    2012            Nurse visit         Bhupinder Aspen DO

 

                    2012           Nurse visit         Bhupinder Aspen DO

 

                    5/3/2012            Nurse visit         Bhupinder Aspen DO

 

                    2012           Nurse visit         Bhupinder Aspen DO

 

                    2012           Nurse visit         Bhupinder Aspen DO

 

                    2012           Nurse visit         Bhupinder Aspen DO

 

                    2011           Nurse visit         Bhupinder Aspen DO

 

                    10/20/2011          Nurse visit         Bhupinder Aspen DO

 

                    2011           Office visit        Bhupinder Aspen DO

 

                    2011           Nurse visit         Radha GREEN

 

                    2011            Office visit        Bhupinder Aspen DO

 

                    2011           Nurse visit         Bhupinder Aspen DO

 

                    2011            Office visit        Bhupinder Aspen DO

 

                    2011           Office visit        Bhupinder Aspen DO

 

                    5/10/2011           Office visit        Bhupinder Aspen DO

 

                    2011           Office visit        Bhupinder Aspen DO

 

                    4/15/2011           Office visit        Devin Angel DO

 

                    2011           Office visit        Devin Angel DO

 

                    10/15/2010          Office visit        Devin Angel DO

 

                    2010           Office visit        Devin Angel DO

 

                    2010            Office visit        Devin Angel DO

 

                    2010           Office visit        Devin Angel DO

 

                    2010            Office visit        Devin Angel DO

 

                    3/8/2010            Office visit        Devin Masterson MD

 

                    2010            Surgery             Devin Masterson MD

 

                    2010            Office visit        Devin Angel DO

 

                    2010           Surgery             Devin Masterson MD

 

                    2010           Hospital            Devin Masterson MD

 

                    2010           [facility]            Devin Masterson MD

 

                    10/22/2009          Office visit        Devin Angel DO

## 2019-06-26 NOTE — XMS REPORT
MU2 Ambulatory Summary

                             Created on: 2017



Pauline Gan

External Reference #: [id number]

: 1950

Sex: Female



Demographics







                          Address                   [address]

[place]  [address]

 

                          Home Phone                [phone]

 

                          Preferred Language        English

 

                          Marital Status            Legally 

 

                          Hindu Affiliation     Unknown

 

                          Race                      White

 

                          Ethnic Group              Not  or 





Author







                          Bhupinder Aguilar

 

                          [facility]

 

                          Address                   [address]

[place]  [phone]



 

                          Phone                     [phone]







Care Team Providers







                    Care Team Member Name    Role                Phone

 

                    Bhupinder Louise    PCP                 Unavailable

 

                    Bhupinder Louise    PreferredProvider    Unavailable







Allergies and Adverse Reactions







                    Name                Reaction            Notes

 

                    NO KNOWN DRUG ALLERGIES                         







Plan of Treatment







             Planned Activity    Comments     Planned Date    Planned Time    Plan/Goal

 

             Injection,Subcutaneous/Intramuscul, C Medicare                 2017    12:00 AM      

 

             Urinalysis with C/S If Indicated.                 2017    12:00 AM      







Medications







                                        Active 

 

             Name         Start Date    Estimated Completion Date    SIG          Comments

 

                Latuda 20 mg oral tablet                                    take 1 tablet (20 mg) by oral route once daily with

 food (at least 350 calories)            

 

             pravastatin 40 mg oral tablet    3/30/2015                 TAKE 1 TABLET BY MOUTH DAILY     

 

                Namenda XR 28 mg oral capsule,sprinkle,ER 24hr    2015                       take 1 capsule (28

 mg) by oral route once daily            

 

                Namenda XR 28 mg oral capsule,sprinkle,ER 24hr    2016                       take 1 capsule (28

 mg) by oral route once daily            

 

                potassium chloride 10 mEq oral tablet extended release    2016                       take 1 tablet

 (10 meq) by oral route once daily       

 

             pravastatin 40 mg oral tablet    2016                 TAKE 1 TABLET BY MOUTH DAILY     

 

                Vitamin B-12 1,000 mcg/mL injection solution    2016                       inject 1 milliliter 

(1,000 mcg) by intramuscular route once a month     

 

                potassium chloride 10 mEq oral tablet extended release    2016                      take 1 tablet

 (10 meq) by oral route once daily       

 

                Namenda XR 28 mg oral capsule,sprinkle,ER 24hr    2016                      TAKE 1 CAPSULE BY

 MOUTH EVERY DAY                         

 

                furosemide 40 mg oral tablet    2016                      take 1 tablet (40 mg) by oral route

 once daily                              

 

                mirtazapine 45 mg oral tablet                                    take 1 tablet (45 mg) by oral route once daily

 before bedtime                          

 

             Fish Oil 300-1,000 mg oral capsule                              take 1 capsule by oral route daily     

 

             Vitamin D3 1,000 unit oral tablet                              take 1 tablet by oral route daily     

 

                Calcium 600 600 mg calcium (1,500 mg) oral tablet                                    take 1 tablet by oral route

 daily                                   

 

                Aspirin Low Dose 81 mg oral tablet,delayed release (DR/EC)                                    take 1 tablet 

(81 mg) by oral route once daily         

 

                metoclopramide HCl 10 mg oral tablet    2017                       take 1 tablet by oral route 

2 times a day for 50 days                









                                         

 

             Name         Start Date    Expiration Date    SIG          Comments

 

             Reglan 10mg    3/29/2010    2010    one ac and hs     

 

                Keflex 500 mg oral capsule    2010       10/1/2010       take 1 capsule (500 mg) by oral

 route every 6 hours for 10 days         

 

                Bactrim -160 mg oral tablet    2011       take 1 tablet by oral route

 every 12 hours for 7 days               

 

                triamcinolone acetonide 0.1 % topical cream    2011      apply a thin

 layer to the affected area(s) by topical route 2 times per day     

 

                sertraline 100 mg oral tablet    4/10/2012       5/10/2012       take 1.5 tablets by oral route

 daily for 30 days                       

 

                ergocalciferol (vitamin D2) 50,000 unit oral capsule    4/15/2013       2013       TAKE

 ONE CAPSULE BY MOUTH ONCE A WEEK        

 

                CYANOCOBALAM 1000MCGINJ 1000 milliliter    2013       INJECT 1ML INTRAMUSCULAR

 ONCE A MONTH                            

 

                pravastatin 40 mg oral tablet    3/25/2014       3/20/2015       TAKE ONE TABLET BY MOUTH EVERY

 DAY                                     

 

                          Zostavax (PF) 19,400 unit/0.65 mL subcutaneous suspension for reconstitution    3/23/2015

                    3/24/2015           inject 0.65 milliliter by subcutaneous route once     

 

                famciclovir 500 mg oral tablet    12/3/2015       12/10/2015      take 1 tablet (500 mg) by

 oral route every 8 hours for 7 days     

 

                furosemide 40 mg oral tablet    2016      take 1 tablet (40 mg) by oral

 route once daily                        

 

                Cipro 500 mg oral tablet    2016       take 1 tablet (500 mg) by oral route

 2 times per day for 5 days              

 

                Bactrim -160 mg oral tablet    2016        take 1 tablet by oral route

 every 12 hours for 7 days               

 

                metoclopramide HCl 10 mg oral tablet    2017       take 1 tablet by oral

 route 2 times a day for 50 days         

 

                Macrobid 100 mg oral capsule    2017       take 1 capsule (100 mg) by oral

 route 2 times per day with food for 7 days     

 

                Augmentin 875-125 mg oral tablet    2017       take 1 tablet by oral route

 every 12 hours for 7 days               

 

                amoxicillin 500 mg oral tablet    2017       take 1 tablet (500 mg) by oral

 route every 12 hours for 7 days         

 

                cefuroxime axetil 500 mg oral tablet    2017       take 1 tablet (500 mg)

 by oral route 2 times per day for 7 days     









                                        Discontinued 

 

             Name         Start Date    Discontinued Date    SIG          Comments

 

                Tylenol 325 mg oral tablet                    2013        take 1 - 2 tablets (325 -650 mg) by oral

 route every 4-6 hours as needed         

 

                Calcium 600 + D(3) 600 mg(1,500mg) -400 unit oral tablet                    2011       take 1 tablet

 by oral route 2 times a day            no longer taking

 

                Vitamin B-12 1,000 mcg oral tablet extended release    2010       take 1

 tablet by oral route daily             no longer taking

 

                Antifungal (clotrimazole) 1 % topical cream    2010       apply to the 

affected and surrounding areas of skin by topical route 2 times per day morning 
and evening                              

 

                sertraline 100 mg oral tablet    5/10/2011       2011       take 2 tablets (200 mg) by 

oral route once daily                   discontinued by Dr. Serrano

 

                mirtazapine 15 mg oral tablet                    2011        take 1 tablet (15 mg) by oral route 

once daily before bedtime               Dr. Serrano

 

                mirtazapine 15 mg oral tablet                    2011        take 1 tablet (15 mg) by oral route 

once daily before bedtime               dc'd by Dr. Serrano

 

                Pristiq 50 mg oral tablet extended release 24 hr                    2013        take 1 tablet (50

 mg) by oral route once daily           Dr. Serrano

 

                Pristiq 50 mg oral tablet extended release 24 hr                    2013        take 1 tablet (50

 mg) by oral route once daily           dose updated

 

                Vitamin B-12 1,000 mcg/mL injection solution    2011        inject 1 milliliter

 (1,000 mcg) by intramuscular route once a month    on list already

 

                    syringe with needle 1 mL 25 gauge x 1" miscellaneous syringe    2011

                          use for injection once a month     

 

                clotrimazole 1 % topical cream    2011        apply to the affected and surrounding

 areas of skin by topical route 2 times per day in the morning and evening     

 

                Vitamin D2 50,000 unit oral capsule    2011        take 1 capsule (50,000

 unit) by oral route once weekly        generic on list

 

                Pravachol 40 mg oral tablet    2012        take 1 tablet (40 mg) by oral 

route once daily for 90 days            generic on list

 

                lithium carbonate 300 mg oral capsule    2012        take 1 capsule by oral

 route daily                            dose updated

 

                Pristiq 100 mg oral tablet extended release 24 hr                    4/10/2012       take 1 and 1/2 

tablet (150 mg) by oral route once daily    Mental Health provider

 

                Pristiq 100 mg oral tablet extended release 24 hr                    4/10/2012       take 1 and 1/2 

tablet (150 mg) by oral route once daily    Discontinued by Dr Efrain Knight at [facility]

 

                hydroxyzine HCl 50 mg oral tablet    10/16/2014      2015       take 1 tablet (50 mg) 

by oral route at bedtime                 

 

                lithium carbonate 300 mg oral capsule    2015       take 1 capsule (300

 mg) by oral route 2 for 30 days         

 

                fluconazole 100 mg oral tablet    2015       12/3/2015       take 1 tablet (100 mg) by 

oral route once a week                   

 

                ketoconazole 2 % topical cream    2015       12/3/2015       apply to the affected area(s)

 by topical route 2 times per day        

 

                prednisone 10 mg oral tablet    12/3/2015       2016        take 2 tablets (20 mg) by oral

 route once daily for 4 days 1 tablet daily for 4 days 0.5 tablet daily for 4 
days                                     

 

                triamcinolone acetonide 0.1 % topical cream    2016       apply a thin layer

 to the affected area(s) by topical route 2 times per day     

 

                Cipro 500 mg oral tablet    1/15/2017       2017       take 1 tablet (500 mg) by oral route

 every 12 hours for 10 days              







Problem List







                    Description         Status              Onset

 

                    Artificial opening status; colostomy    Active               

 

                    Bipolar disorder, unspecified    Active               

 

                    Hyperlipidemia      Active               

 

                    Peritoneal Neoplasm, Malignant    Active               

 

                    Anemia, Pernicious    Active               

 

                    Arthritis unspecified    Active               

 

                    B12 deficiency      Active               







Vital Signs







      Date    Time    BP-Sys(mm[Hg]    BP-Lynn(mm[Hg])    HR(bpm)    RR(rpm)    Temp    WT    HT    HC    BMI

                    BSA                 BMI Percentile      O2 Sat(%)

 

        2017    3:05:00 PM    134 mmHg    70 mmHg    70 bpm    20 rpm    97.4 F    181 lbs    69 in

                          26.73 kg/m2    2.00 m2                   98 %

 

        2017    11:07:00 AM    124 mmHg    64 mmHg    62 bpm    17 rpm    98.2 F    181.2 lbs    69

 in                       26.7583 kg/m    2.0003 m                 98 %

 

        1/15/2017    3:34:00 PM    148 mmHg    89 mmHg    69 bpm    20 rpm    98.2 F    179 lbs    69 in

                          26.43 kg/m2    1.99 m2                   98 %

 

       2017    1:51:00 PM    160 mmHg    90 mmHg    100 bpm    20 rpm    96.5 F    179 lbs             

                                                                98 %

 

       2016    3:11:00 PM    134 mmHg    76 mmHg    80 bpm    20 rpm    98 F    163 lbs    69 in     

                24.07 kg/m2     1.90 m2                         98 %

 

        2016    2:04:00 PM    142 mmHg    86 mmHg    68 bpm    16 rpm    98.5 F    166 lbs    63 in

                          29.4053 kg/m    1.8295 m                 100 %

 

        2016    11:27:00 AM    148 mmHg    78 mmHg    90 bpm    20 rpm    98.2 F    153 lbs    69 in

                          22.59 kg/m2    1.84 m2                   96 %

 

        12/3/2015    9:50:00 AM    132 mmHg    70 mmHg    62 bpm    16 rpm    97.9 F    145 lbs    69 in

                          21.4125 kg/m    1.7894 m                 100 %

 

        2015    8:52:00 AM    132 mmHg    68 mmHg    52 bpm    20 rpm    97.8 F    141 lbs    69 in

                          20.82 kg/m2    1.76 m2                   100 %

 

        2015    3:25:00 PM    120 mmHg    62 mmHg    72 bpm    16 rpm    98.1 F    136 lbs    69 in

                          20.0835 kg/m    1.733 m                 98 %

 

       3/23/2015    2:55:00 PM    130 mmHg    76 mmHg    68 bpm    18 rpm    97 F    140 lbs    69 in    

                20.67 kg/m2     1.76 m2                         98 %

 

        10/16/2014    11:11:00 AM    120 mmHg    66 mmHg    77 bpm    20 rpm    98 F    130 lbs    69 in

                          19.1974 kg/m    1.6943 m                 100 %

 

        2014    3:21:00 PM    130 mmHg    66 mmHg    63 bpm    18 rpm    97.2 F    160 lbs    69 in

                          23.6276 kg/m    1.88 m2                   99 %

 

        2013    10:35:00 AM    132 mmHg    70 mmHg    66 bpm    20 rpm    98.1 F    157 lbs    69 in

                          23.18 kg/m2    1.862 m                  

 

        2013    1:29:00 PM    132 mmHg    70 mmHg    76 bpm    18 rpm    98.2 F    166 lbs    69 in 

                          24.5137 kg/m    1.91 m2                    

 

       2013    2:46:00 PM    128 mmHg    70 mmHg    76 bpm    16 rpm    98 F    160 lbs    69 in     

                23.63 kg/m2     1.8797 m                       

 

        2011    8:49:00 AM    128 mmHg    78 mmHg    70 bpm    18 rpm    97.9 F    164 lbs    69 in

                          24.2183 kg/m    1.90 m2                    

 

     2011    1:31:00 PM    132 mmHg    68 mmHg    84 bpm         97 F    167 lbs                        

                                         

 

        2011    9:09:00 AM    128 mmHg    70 mmHg    72 bpm    18 rpm    98.2 F    163 lbs    64 in 

                          27.9786 kg/m    1.83 m2                    

 

       2011    10:01:00 AM    132 mmHg    70 mmHg    72 bpm    18 rpm    98.2 F    154 lbs             

                                                                 

 

       2011    2:47:00 PM    128 mmHg    70 mmHg    72 bpm    18 rpm    97.8 F    156 lbs             

                                                                 

 

       5/10/2011    3:16:00 PM    144 mmHg    80 mmHg    72 bpm    18 rpm    98.2 F    158 lbs             

                                                                 

 

        2011    10:11:00 AM    132 mmHg    70 mmHg    70 bpm    18 rpm    98.2 F    168 lbs    69 in

                          24.809 kg/m    1.93 m2                    

 

        4/15/2011    10:52:00 AM    110 mmHg    60 mmHg    75 bpm    16 rpm    97.5 F    172.375 lbs    

69 in                     25.46 kg/m2    1.951 m                 100 %

 

        2011    11:43:00 AM    120 mmHg    82 mmHg    75 bpm    16 rpm    97.2 F    178.5 lbs    69

 in                       26.3596 kg/m    1.99 m2                   100 %

 

        10/15/2010    1:32:00 PM    120 mmHg    70 mmHg    80 bpm    18 rpm    96.6 F    177 lbs    69 in

                          26.14 kg/m2    1.977 m                 100 %

 

        2010    3:50:00 PM    168 mmHg    100 mmHg    82 bpm    18 rpm    97.8 F    177.5 lbs    69

 in                       26.2119 kg/m    1.98 m2                   97 %

 

        2010    1:21:00 PM    140 mmHg    80 mmHg    59 bpm    16 rpm    97.6 F    173.25 lbs    69 

in                        25.58 kg/m2    1.956 m                 100 %

 

        2010    3:02:00 PM    140 mmHg    80 mmHg    61 bpm    16 rpm    97.6 F    173.125 lbs    69

 in                       25.5658 kg/m    1.96 m2                   99 %

 

        2010    1:23:00 PM    130 mmHg    80 mmHg    66 bpm    16 rpm    96.8 F    173 lbs    69 in 

                          25.55 kg/m2    1.9546 m                 100 %

 

        2010    12:58:00 PM    130 mmHg    88 mmHg    75 bpm    16 rpm    98.4 F    172.25 lbs    69

 in                       25.4366 kg/m    1.95 m2                   100 %







Social History







                    Name                Description         Comments

 

                    denies alcohol use                         

 

                    denies smoking                           

 

                    Denies illicit substance abuse                         

 

                    retired                                 direct care

 

                    Single                                   

 

                    Exercises regularly                         

 

                    Attended some college                         

 

                    Tobacco             Never smoker         







History of Procedures







                    Date Ordered        Description         Order Status

 

                    2010 12:00 AM    COMPREHEN METABOLIC PANEL    Reviewed

 

                    2010 12:00 AM    COMPLETE CBC W/AUTO DIFF WBC    Reviewed

 

                    2010 12:00 AM    LIPID PANEL         Reviewed

 

                          2015 12:00 AM        B12 Injection, Up to 1000 Mcg NDC#1562-5200-79 C Medicare 

                                        Reviewed

 

                    2011 12:00 AM    MAMMOGRAM SCREENING    Reviewed

 

                    2011 12:00 AM    CYTOPATH C/V THIN LAYER    Reviewed

 

                    2011 12:00 AM    B12 Injection 1 cc NDC#[address]-58    Reviewed

 

                    2015 12:00 AM    THER/PROPH/DIAG INJ SC/IM    Reviewed

 

                    2015 12:00 AM    B12 Injection, Up to 1000 Mcg NDC#3177-9383-86    Reviewed

 

                    2011 12:00 AM    THER/PROPH/DIAG INJ SC/IM    Reviewed

 

                    2011 12:00 AM    B12 Injection([facility]) Ndc#4797-1068-12-    Reviewed

 

                    2015 12:00 AM    THER/PROPH/DIAG INJ SC/IM    Reviewed

 

                    2015 12:00 AM    B12 Injection, Up to 1000 Mcg NDC#0634-7426-68    Reviewed

 

                    10/20/2011 12:00 AM    THER/PROPH/DIAG INJ SC/IM    Reviewed

 

                    10/20/2011 12:00 AM    B12 Injection([place]) Ndc#2018-5618-50-    Reviewed

 

                    2016 12:00 AM    THER/PROPH/DIAG INJ SC/IM    Reviewed

 

                    2016 12:00 AM    B12 Injection, Up to 1000 Mcg NDC#0269-1540-43    Reviewed

 

                    3/14/2016 12:00 AM    VITAMIN B-12        Reviewed

 

                    3/15/2016 12:00 AM    THER/PROPH/DIAG INJ SC/IM    Reviewed

 

                    3/15/2016 12:00 AM    B12 Injection, Up to 1000 Mcg NDC#5456-9789-11    Reviewed

 

                    2011 12:00 AM    ***Immunization administration, Medicare flu    Reviewed

 

                    2011 12:00 AM    Fluzone ** MEDICARE Only **    Reviewed

 

                    2011 12:00 AM    THER/PROPH/DIAG INJ SC/IM    Reviewed

 

                    2011 12:00 AM    B12 Injection ([facility]) Ndc#9141-4385-61    Reviewed

 

                    2016 12:00 AM    B12 Injection, Up to 1000 Mcg NDC#3767-8019-66 [facility] Medicare    

Reviewed

 

                    2016 12:00 AM    TTE W/DOPPLER COMPLETE    Reviewed

 

                    2016 12:00 AM    EXTREMITY STUDY     Reviewed

 

                          2016 12:00 AM        B12 Injection, Up to 1000 Mcg NDC#8329-1957-96 [facility] Medicare 

                                        Reviewed

 

                    2016 12:00 AM    THER/PROPH/DIAG INJ SC/IM    Reviewed

 

                    2016 12:00 AM    B12 Injection, Up to 1000 Mcg NDC#4991-2526-70    Reviewed

 

                    2016 12:00 AM    THER/PROPH/DIAG INJ SC/IM    Reviewed

 

                    2012 12:00 AM    B12 Injection([place]) Ndc#[id number]-    Reviewed

 

                    2016 12:00 AM    B12 Injection, Up to 1000 Mcg NDC#3463-8326-43    Reviewed

 

                    2016 12:00 AM    THER/PROPH/DIAG INJ SC/IM    Reviewed

 

                    2012 12:00 AM    THER/PROPH/DIAG INJ SC/IM    Reviewed

 

                    2012 12:00 AM    B12 Injection ([employer]) Ndc#0449-4546-03    Reviewed

 

                    2016 12:00 AM    THER/PROPH/DIAG INJ SC/IM    Reviewed

 

                    2016 12:00 AM    B12 Injection, Up to 1000 Mcg NDC#8356-5487-34    Reviewed

 

                    2016 12:00 AM    B12 Injection, Up to 1000 Mcg NDC#4160-0781-68    Reviewed

 

                    2016 12:00 AM    THER/PROPH/DIAG INJ SC/IM    Reviewed

 

                    2012 12:00 AM    THER/PROPH/DIAG INJ SC/IM    Reviewed

 

                    2012 12:00 AM    B12 Injection([place]) Ndc#3666-9811-69-    Reviewed

 

                    12/15/2016 12:00 AM    B12 Injection, Up to 1000 Mcg NDC#7343-3221-00    Reviewed

 

                    12/15/2016 12:00 AM    THER/PROPH/DIAG INJ SC/IM    Reviewed

 

                    2016 12:00 AM    URNLS DIP STICK/TABLET RGNT AUTO W/O MICROSCOPY    Returned

 

                    1/3/2017 12:00 AM    URNLS DIP STICK/TABLET RGNT AUTO W/O MICROSCOPY    Returned

 

                    2017 12:00 AM    URINE CULTURE/COLONY COUNT    Returned

 

                    2017 12:00 AM    Rocephin 1 gram Injection, [facility] Medicare    Reviewed

 

                    2017 12:00 AM    THERAPEUTIC PROPHYLACTIC/DX INJECTION SUBQ/IM    Reviewed

 

                    2017 12:00 AM    B12 1000mcg Injection, [facility] Medicare    Reviewed

 

                    5/3/2012 12:00 AM    THER/PROPH/DIAG INJ SC/IM    Reviewed

 

                    5/3/2012 12:00 AM    B12 Injection([place]) Ndc#0449-9829-06-    Reviewed

 

                    2017 12:00 AM    THERAPEUTIC PROPHYLACTIC/DX INJECTION SUBQ/IM    Reviewed

 

                    2017 12:00 AM    B12 1000mcg Injection, [facility] Medicare    Reviewed

 

                    2017 12:00 AM    MRI BRAIN STEM W/O & W/DYE    Reviewed

 

                    2017 12:00 AM    VITAMIN B-12        Reviewed

 

                    2017 12:00 AM    Speech Therapy Consult    Reviewed

 

                    2017 12:00 AM    ASSAY OF CREATININE    Reviewed

 

                    2012 12:00 AM    IMMUNOTHERAPY INJECTIONS    Reviewed

 

                    2012 12:00 AM    B12 Injection([place]) Ndc#8077-3087-32-    Reviewed

 

                    2017 12:00 AM    URINALYSIS AUTO W/SCOPE    Reviewed

 

                    2012 12:00 AM    THER/PROPH/DIAG INJ SC/IM    Reviewed

 

                    2012 12:00 AM    B12 Injection, Up to 1000 Mcg NDC#9951-9576-82    Reviewed

 

                    2012 12:00 AM    THER/PROPH/DIAG INJ SC/IM    Reviewed

 

                    2012 12:00 AM    B12 Injection, Up to 1000 Mcg NDC#0128-6558-66    Reviewed

 

                    2012 12:00 AM    THER/PROPH/DIAG INJ SC/IM    Reviewed

 

                    2012 12:00 AM    B12 Injection, Up to 1000 Mcg NDC#0516-6968-97    Reviewed

 

                    10/16/2012 12:00 AM    THER/PROPH/DIAG INJ SC/IM    Reviewed

 

                    10/16/2012 12:00 AM    B12 Injection, Up to 1000 Mcg NDC#8706-5119-55    Reviewed

 

                    2010 12:00 AM    COMPREHEN METABOLIC PANEL    Reviewed

 

                    2010 12:00 AM    COMPLETE CBC W/AUTO DIFF WBC    Reviewed

 

                    2010 12:00 AM    LIPID PANEL         Reviewed

 

                    2013 12:00 AM    Flu Injection 3 Years And Above NDC# [address]-21  [facility]    Reviewed



 

                    2013 12:00 AM    COMPLETE CBC W/AUTO DIFF WBC    Reviewed

 

                    2013 12:00 AM    ASSAY OF LITHIUM    Reviewed

 

                    2013 12:00 AM    METABOLIC PANEL TOTAL CA    Reviewed

 

                    4/3/2013 12:00 AM    THER/PROPH/DIAG INJ SC/IM    Reviewed

 

                    4/3/2013 12:00 AM    B12 Injection, Up to 1000 Mcg NDC#7932-4189-70    Reviewed

 

                    2013 12:00 AM    THER/PROPH/DIAG INJ SC/IM    Reviewed

 

                    2013 12:00 AM    B12 Injection, Up to 1000 Mcg NDC#9594-6382-74    Reviewed

 

                    2013 12:00 AM    THER/PROPH/DIAG INJ SC/IM    Reviewed

 

                    2013 12:00 AM    B12 Injection, Up to 1000 Mcg NDC#0299-6366-20    Reviewed

 

                    2013 12:00 AM    LIPID PANEL         Reviewed

 

                    2013 12:00 AM    VITAMIN D 25 HYDROXY    Reviewed

 

                    2013 12:00 AM    THER/PROPH/DIAG INJ SC/IM    Reviewed

 

                    2013 12:00 AM    B12 Injection, Up to 1000 Mcg NDC#7472-0022-47    Reviewed

 

                    2013 12:00 AM    THER/PROPH/DIAG INJ SC/IM    Reviewed

 

                    3/6/2014 12:00 AM    THER/PROPH/DIAG INJ SC/IM    Reviewed

 

                    2014 12:00 AM    THER/PROPH/DIAG INJ SC/IM    Reviewed

 

                    2014 12:00 AM    B12 Injection, Up to 1000 Mcg NDC#5141-2844-01    Reviewed

 

                    2010 12:00 AM    SKIN FUNGI CULTURE    Reviewed

 

                    10/9/2010 12:00 AM    COMPREHEN METABOLIC PANEL    Reviewed

 

                    10/9/2010 12:00 AM    LIPID PANEL         Reviewed

 

                    2010 12:00 AM    THER/PROPH/DIAG INJ SC/IM    Reviewed

 

                    2010 12:00 AM    B12 Injection Ndc#[address]-26 ([facility])    Reviewed

 

                    2010 12:00 AM    THER/PROPH/DIAG INJ SC/IM    Reviewed

 

                    2010 12:00 AM    Kenalog 40 Mg Im-Ndc#[address]-35 ([place])    Reviewed

 

                    10/15/2010 12:00 AM    FLU VACCINE 3 YRS & > IM    Reviewed

 

                    10/15/2010 12:00 AM    Admin.Of M/C Cov.Vaccine-Flu Vacc.    Reviewed

 

                    1/15/2011 12:00 AM    COMPLETE CBC W/AUTO DIFF WBC    Reviewed

 

                    1/15/2011 12:00 AM    COMPREHEN METABOLIC PANEL    Reviewed

 

                    1/15/2011 12:00 AM    LIPID PANEL         Reviewed

 

                    2014 12:00 AM    MAMMOGRAM SCREENING    Reviewed

 

                    2014 12:00 AM    Screening mammography, bilateral    Reviewed

 

                    7/10/2014 12:00 AM    THER/PROPH/DIAG INJ SC/IM    Reviewed

 

                    7/10/2014 12:00 AM    B12 Injection, Up to 1000 Mcg NDC#1945-07[address]-62    Reviewed

 

                    2011 12:00 AM    COMPLETE CBC W/AUTO DIFF WBC    Reviewed

 

                    2011 12:00 AM    COMPREHEN METABOLIC PANEL    Reviewed

 

                    2011 12:00 AM    LIPID PANEL         Reviewed

 

                    2014 12:00 AM    B12 Injection, Up to 1000 Mcg NDC#6937-2648-91    Reviewed

 

                    10/19/2014 12:00 AM    MAMMOGRAM SCREENING    Reviewed

 

                    10/19/2014 12:00 AM    Screening mammography, bilateral    Reviewed

 

                    10/16/2014 12:00 AM    B12 Injection, Up to 1000 Mcg NDC#4620-1067-57    Reviewed

 

                    10/16/2014 12:00 AM    COMPLETE CBC W/AUTO DIFF WBC    Reviewed

 

                    10/16/2014 12:00 AM    COMPREHEN METABOLIC PANEL    Reviewed

 

                    10/16/2014 12:00 AM    IMMUNOASSAY TUMOR     Reviewed

 

                    10/16/2014 12:00 AM    LIPID PANEL         Reviewed

 

                    10/16/2014 12:00 AM    ASSAY OF LITHIUM    Reviewed

 

                    10/16/2014 12:00 AM    MAMMOGRAM SCREENING    Reviewed

 

                    2011 12:00 AM    ASSAY OF PARATHORMONE    Reviewed

 

                    2011 12:00 AM    VITAMIN D 25 HYDROXY    Reviewed

 

                    2011 12:00 AM    ASSAY OF LITHIUM    Reviewed

 

                    2011 12:00 AM    METABOLIC PANEL TOTAL CA    Reviewed

 

                    2011 12:00 AM    CT HEAD/BRAIN W/O & W/DYE    Reviewed

 

                    3/23/2015 12:00 AM    PNEUMOCOCCAL VACC 13 GLENDY IM    Reviewed

 

                    3/23/2015 12:00 AM    Vitamin B12 injection    Reviewed

 

                    2011 12:00 AM    ASSAY OF LITHIUM    Reviewed

 

                    2011 12:00 AM    B12 Injection Ndc#[address]-60  [facility]    Reviewed

 

                    2015 12:00 AM    THER/PROPH/DIAG INJ SC/IM    Reviewed

 

                    2015 12:00 AM    B12 Injection, Up to 1000 Mcg NDC#3223-7824-73    Reviewed

 

                    2015 12:00 AM    COMPLETE CBC W/AUTO DIFF WBC    Reviewed

 

                    2015 12:00 AM    COMPREHEN METABOLIC PANEL    Reviewed

 

                    2015 12:00 AM    LIPID PANEL         Reviewed

 

                    2015 12:00 AM    ASSAY OF LITHIUM    Reviewed

 

                    2011 12:00 AM    VIT D 1 25-DIHYDROXY    Reviewed

 

                    2011 12:00 AM    VITAMIN B-12        Reviewed

 

                    2015 12:00 AM    B12 Injection, Up to 1000 Mcg NDC#8942-1850-34    Reviewed

 

                    2015 12:00 AM    THER/PROPH/DIAG INJ SC/IM    Reviewed

 

                    2015 12:00 AM    B12 Injection, Up to 1000 Mcg NDC#3473-8476-04    Reviewed

 

                    2011 12:00 AM    THER/PROPH/DIAG INJ SC/IM    Reviewed

 

                    2011 12:00 AM    B12 Injection ([employer] [facility]) Ndc#7016-0858-35    Reviewed

 

                    2015 12:00 AM    THER/PROPH/DIAG INJ SC/IM    Reviewed

 

                    2015 12:00 AM    B12 Injection, Up to 1000 Mcg NDC#0266-4614-50    Reviewed







Results Summary







                          Data and Description      Results

 

                          2004 12:00 AM        Colonoscopy-Women and Men over 50 Normal 

 

                          2008 12:00 AM         Pap Smear Declined 

 

                          10/7/2009 12:00 AM        Cholest Cry Stone Ql .0 %LDLc SerPl-mCnc 123.0 mg/dLHDLc

 SerPl-mCnc 34.0 mg/dLTrigl SerPl-mCnc 190.0 mg/dLGlucose SerPl-mCnc 78.0 mg/dL

 

                          2009 12:00 AM        Mammogram -Women over 40 Normal HIV1+2 Ab Ser Ql no risk 

 

                          2010 8:47 AM         Dexa Bone Scan Refused Aspirin reccommended Contraindication 



 

                          2010 8:48 AM         Depression Done 

 

                          2010 12:00 AM         Foot Exam-Diabetic Done 

 

                          2010 12:00 AM         Cholest Cry Stone Ql .0 %LDLc SerPl-mCnc 126.0 mg/dLGlucose

 SerPl-mCnc 102.0 mg/dL

 

                          2010 8:45 AM          TRIGLYCERIDES 122.0 mg/dLCHOLESTEROL 186.0 mg/dLHDL 36.0 mg/dLTOT

 CHOL/HDL 5.2 LDL (CALC) 126.0 mg/dLGLUCOSE 102.0 mg/dLSODIUM 143.0 
mmol/LPOTASSIUM 3.70 mmol/LCHLORIDE 111.0 mmol/LCO2 23.0 mmol/LBUN 10.0 
mg/dLCREATININE 0.80 mg/dLSGOT/AST 12.0 IU/LSGPT/ALT 11.0 IU/LALK PHOS 65.0 
IU/LTOTAL PROTEIN 7.20 g/dLALBUMIN 3.90 g/dLTOTAL BILI 0.50 mg/dLCALCIUM 10.20 
mg/dLAGE 59 GFR NonAA 73 GFR AA 88 eGFR >60 mL/min/1.73 m2eGFR AA* >60 WBC 5.7 
RBC 3.26 HGB 10.60 g/dLHCT 31.70 %MCV 97.0 fLMCH 32.50 pgMCHC 33.40 g/dLRDW SD 
47 RDW CV 13.30 %MPV 9.70 fLPLT 287 NRBC# 0.00 NRBC% 0.0 %NEUT 62.90 %%LYMP 
21.80 %%MONO 9.90 %%EOS 5.0 %%BASO 0.40 %#NEUT 3.56 #LYMP 1.23 #MONO 0.56 #EOS 
0.28 #BASO 0.02 MANUAL DIFF NOT IND 

 

                          2010 12:00 AM        Glucose SerPl-mCnc 96.0 mg/dLCholest Cry Stone Ql .0 %LDLc

 SerPl-mCnc 146.0 mg/dL

 

                          2010 8:26 AM         TRIGLYCERIDES 106.0 mg/dLCHOLESTEROL 199.0 mg/dLHDL 32.0 mg/dLTOT

 CHOL/HDL 6.2 LDL (CALC) 146.0 mg/dLGLUCOSE 96.0 mg/dLSODIUM 143.0 
mmol/LPOTASSIUM 4.0 mmol/LCHLORIDE 113.0 mmol/LCO2 24.0 mmol/LBUN 13.0 
mg/dLCREATININE 1.0 mg/dLSGOT/AST 11.0 IU/LSGPT/ALT 6.0 IU/LALK PHOS 56.0 
IU/LTOTAL PROTEIN 6.60 g/dLALBUMIN 3.80 g/dLTOTAL BILI 0.50 mg/dLCALCIUM 9.30 
mg/dLAGE 59 GFR NonAA 57 GFR AA 69 eGFR 57 eGFR AA* >60 

 

                          10/6/2010 12:00 AM        Cholest Cry Stone Ql .0 %LDLc SerPl-mCnc 111.0 mg/dLGlucose

 SerPl-mCnc 81.0 mg/dL

 

                          10/6/2010 2:45 PM         TRIGLYCERIDES 123.0 mg/dLCHOLESTEROL 178.0 mg/dLHDL 42.0 mg/dLTOT

 CHOL/HDL 4.2 LDL (CALC) 111.0 mg/dLGLUCOSE 81.0 mg/dLSODIUM 139.0 
mmol/LPOTASSIUM 4.10 mmol/LCHLORIDE 106.0 mmol/LCO2 24.0 mmol/LBUN 13.0 
mg/dLCREATININE 0.90 mg/dLSGOT/AST 13.0 IU/LSGPT/ALT 11.0 IU/LALK PHOS 61.0 
IU/LTOTAL PROTEIN 7.10 g/dLALBUMIN 3.90 g/dLTOTAL BILI 0.30 mg/dLCALCIUM 9.30 
mg/dLAGE 60 GFR NonAA 64 GFR AA 78 eGFR >60 mL/min/1.73 m2eGFR AA* >60 WBC 6.9 
RBC 3.59 HGB 11.50 g/dLHCT 35.30 %MCV 98.0 fLMCH 32.0 pgMCHC 32.60 g/dLRDW SD 46
 RDW CV 12.90 %MPV 9.90 fLPLT 311 NRBC# 0.00 NRBC% 0.0 %NEUT 64.90 %%LYMP 22.50 
%%MONO 7.20 %%EOS 5.10 %%BASO 0.30 %#NEUT 4.45 #LYMP 1.54 #MONO 0.49 #EOS 0.35 
#BASO 0.02 MANUAL DIFF NOT IND 

 

                          2011 12:00 AM         Mammogram -Women over 40 Ordered 

 

                          2011 10:25 AM        TRIGLYCERIDES 111.0 mg/dLCHOLESTEROL 195.0 mg/dLHDL 43.0 mg/dLTOT

 CHOL/HDL 4.5 LDL (CALC) 130.0 mg/dLWBC 5.3 RBC 3.76 HGB 12.0 g/dLHCT 37.80 %MCV
 101.0 fLMCH 31.90 pgMCHC 31.70 g/dLRDW SD 47 RDW CV 13.0 %MPV 9.70 fLPLT 259 
NRBC# 0.00 NRBC% 0.0 %NEUT 69.0 %%LYMP 17.60 %%MONO 8.30 %%EOS 4.70 %%BASO 0.40 
%#NEUT 3.63 #LYMP 0.93 #MONO 0.44 #EOS 0.25 #BASO 0.02 MANUAL DIFF NOT IND 
GLUCOSE 102.0 mg/dLSODIUM 146.0 mmol/LPOTASSIUM 4.20 mmol/LCHLORIDE 113.0 
mmol/LCO2 23.0 mmol/LBUN 15.0 mg/dLCREATININE 1.0 mg/dLSGOT/AST 12.0 
IU/LSGPT/ALT 17.0 IU/LALK PHOS 60.0 IU/LTOTAL PROTEIN 6.90 g/dLALBUMIN 4.20 
g/dLTOTAL BILI 0.40 mg/dLCALCIUM 9.70 mg/dLAGE 60 GFR NonAA 57 GFR AA 69 eGFR 57
 eGFR AA* >60 

 

                          2011 11:49 AM        Cholest Cry Stone Ql .0 %LDLc SerPl-mCnc 130.0 mg/dLHDLc

 SerPl-mCnc 43.0 mg/dLTrigl SerPl-mCnc 111.0 mg/dLGlucose SerPl-mCnc 102.0 mg/dL

 

                          2011 11:52 AM        Pap Smear Declined 

 

                          2011 11:28 AM        Lithium 2.080 mmol/LGLUCOSE 102.0 mg/dLSODIUM 135.0 mmol/LPOTASSIUM

 3.90 mmol/LCHLORIDE 106.0 mmol/LCO2 21.0 mmol/LBUN 12.0 mg/dLCREATININE 1.30 
mg/dLCALCIUM 10.70 mg/dLAGE 60 GFR NonAA 42 GFR AA 51 eGFR 42 eGFR AA* 51 

 

                          2011 8:58 AM          Lithium 0.690 mmol/L

 

                          2011 2:38 PM         VITAMIN B12 3483.0 pg/mL

 

                          2013 3:35 PM          WBC 5.1 RBC 3.73 HGB 11.70 g/dLHCT 36.40 %MCV 98.0 fLMCH 31.40

 pgMCHC 32.10 g/dLRDW SD 47 RDW CV 13.10 %MPV 9.80 fLPLT 224 NRBC# 0.00 NRBC% 
0.0 %NEUT 66.80 %%LYMP 19.10 %%MONO 9.0 %%EOS 4.90 %%BASO 0.20 %#NEUT 3.42 #LYMP
 0.98 #MONO 0.46 #EOS 0.25 #BASO 0.01 MANUAL DIFF NOT IND GLUCOSE 88.0 
mg/dLSODIUM 141.0 mmol/LPOTASSIUM 4.10 mmol/LCHLORIDE 110.0 mmol/LCO2 22.0 
mmol/LBUN 22.0 mg/dLCREATININE 1.10 mg/dLCALCIUM 9.80 mg/dLAGE 62 GFR NonAA 50 
GFR AA 61 eGFR 50 eGFR AA* 60 Lithium 0.760 mmol/L

 

                          2013 11:02 AM        TRIGLYCERIDES 106.0 mg/dLCHOLESTEROL 181.0 mg/dLHDL 46.0 mg/dLTOT

 CHOL/HDL 3.9 LDL (CALC) 114.0 mg/dLVITAMIN D 41.10 ng/mL

 

                          10/17/2014 10:10 AM       WBC 5.0 RBC 3.66 HGB 11.60 g/dLHCT 36.80 %.0 fLMCH 31.70

 pgMCHC 31.50 g/dLRDW SD 50 RDW CV 13.50 %MPV 10.10 fLPLT 209 NRBC# 0.00 NRBC% 
0.0 %NEUT 69.20 %%LYMP 21.0 %%MONO 6.40 %%EOS 3.20 %%BASO 0.20 %#NEUT 3.46 #LYMP
 1.05 #MONO 0.32 #EOS 0.16 #BASO 0.01 MANUAL DIFF NOT IND GLUCOSE 100.0 
mg/dLSODIUM 148.0 mmol/LPOTASSIUM 3.90 mmol/LCHLORIDE 114.0 mmol/LCO2 26.0 
mmol/LBUN 12.0 mg/dLCREATININE 1.20 mg/dLSGOT/AST 9.0 IU/LSGPT/ALT <6 IU/LALK 
PHOS 82.0 IU/LTOTAL PROTEIN 6.90 g/dLALBUMIN 4.0 g/dLTOTAL BILI 0.40 
mg/dLCALCIUM 10.50 mg/dLAGE 64 GFR NonAA 45 GFR AA 55 eGFR 45 eGFR AA* 55 
TRIGLYCERIDES 96.0 mg/dLCHOLESTEROL 155.0 mg/dLHDL 38.0 mg/dLTOT CHOL/HDL 4.1 
LDL (CALC) 98.0 mg/dLLithium 0.850 mmol/LCancer Antigen (CA) 125 8.30 U/mL

 

                          2015 10:25 AM        Lithium 0.790 mmol/LWBC 4.8 RBC 3.44 HGB 11.0 g/dLHCT 35.20 

%.0 fLMCH 32.0 pgMCHC 31.30 g/dLRDW SD 53 RDW CV 14.0 %MPV 9.30 fLPLT 210
 NRBC# 0.00 NRBC% 0.0 %NEUT 70.80 %%LYMP 17.20 %%MONO 8.10 %%EOS 3.50 %%BASO 
0.40 %#NEUT 3.41 #LYMP 0.83 #MONO 0.39 #EOS 0.17 #BASO 0.02 MANUAL DIFF NOT IND 
TRIGLYCERIDES 107.0 mg/dLCHOLESTEROL 174.0 mg/dLHDL 43.0 mg/dLTOT CHOL/HDL 4.0 
LDL (CALC) 110.0 mg/dLGLUCOSE 90.0 mg/dLSODIUM 145.0 mmol/LPOTASSIUM 3.80 
mmol/LCHLORIDE 115.0 mmol/LCO2 24.0 mmol/LBUN 17.0 mg/dLCREATININE 1.30 
mg/dLSGOT/AST 18.0 IU/LSGPT/ALT 17.0 IU/LALK PHOS 56.0 IU/LTOTAL PROTEIN 6.70 
g/dLALBUMIN 3.90 g/dLTOTAL BILI 0.40 mg/dLCALCIUM 9.80 mg/dLAGE 64 GFR NonAA 41 
GFR AA 50 eGFR 41 eGFR AA* 50 

 

                          2015 8:50 AM        WBC 5.8 RBC 3.29 HGB 10.70 g/dLHCT 34.0 %.0 fLMCH 32.50

 pgMCHC 31.50 g/dLRDW SD 52 RDW CV 13.60 %MPV 9.60 fLPLT 223 NRBC# 0.00 NRBC% 
0.0 %NEUT 69.60 %%LYMP 18.90 %%MONO 8.50 %%EOS 2.80 %%BASO 0.20 %#NEUT 4.03 
#LYMP 1.09 #MONO 0.49 #EOS 0.16 #BASO 0.01 MANUAL DIFF NOT IND Lithium 0.620 
mmol/LGLUCOSE 83.0 mg/dLSODIUM 139.0 mmol/LPOTASSIUM 3.90 mmol/LCHLORIDE 109.0 
mmol/LCO2 22.0 mmol/LBUN 19.0 mg/dLCREATININE 1.40 mg/dLSGOT/AST 19.0 
IU/LSGPT/ALT 21.0 IU/LALK PHOS 55.0 IU/LTOTAL PROTEIN 6.50 g/dLALBUMIN 3.90 
g/dLTOTAL BILI 0.50 mg/dLCALCIUM 9.60 mg/dLAGE 65 GFR NonAA 38 GFR AA 46 eGFR 38
 eGFR AA* 46 TRIGLYCERIDES 121.0 mg/dLCHOLESTEROL 192.0 mg/dLHDL 51.0 mg/dLTOT 
CHOL/HDL 3.8 .0 mg/dLFREE T4 0.79 TSH 1.210 uIU/mLHemoglobin A1c 5.40 
%Estim. Avg Glu (eAG) 108 

 

                          3/15/2016 8:08 AM         VITAMIN B12 696.0 pg/mL

 

                          3/23/2016 8:26 AM         WBC 7.0 RBC 3.61 HGB 11.80 g/dLHCT 37.70 %.0 fLMCH 32.70

 pgMCHC 31.30 g/dLRDW SD 49 RDW CV 12.50 %MPV 10.0 fLPLT 207 NRBC# 0.00 NRBC% 
0.0 %NEUT 73.60 %%LYMP 16.40 %%MONO 6.60 %%EOS 3.0 %%BASO 0.30 %#NEUT 5.15 #LYMP
 1.15 #MONO 0.46 #EOS 0.21 #BASO 0.02 MANUAL DIFF NOT IND Lithium 0.940 
mmol/LGLUCOSE 108.0 mg/dLSODIUM 143.0 mmol/LPOTASSIUM 4.30 mmol/LCHLORIDE 110.0 
mmol/LCO2 27.0 mmol/LBUN 16.0 mg/dLCREATININE 1.60 mg/dLSGOT/AST 13.0 
IU/LSGPT/ALT 7.0 IU/LALK PHOS 71.0 IU/LTOTAL PROTEIN 6.80 g/dLALBUMIN 4.0 
g/dLTOTAL BILI 0.20 mg/dLCALCIUM 10.40 mg/dLAGE 65 GFR NonAA 32 GFR AA 39 eGFR 
32 eGFR AA* 39 TRIGLYCERIDES 113.0 mg/dLCHOLESTEROL 169.0 mg/dLHDL 42.0 mg/dLTOT
 CHOL/HDL 4.0 LDL (CALC) 104.0 mg/dLFREE T4 0.86 TSH 2.20 uIU/mLHemoglobin A1c 
5.20 %Estim. Avg Glu (eAG) 103 

 

                          3/25/2016 9:17 AM         COLOR YELLOW APPEARANCE CLEAR SPEC GRAV 1.010 pH 7.0 PROTEIN 

NEGATIVE GLUCOSE NEGATIVE mg/dLKETONE NEGATIVE BILIRUBIN NEGATIVE BLOOD NEGATIVE
 NITRITE NEGATIVE [place] SCREEN SMALL MICRO IND? SEE BELOW WBC/HPF 0-5 RBC/HPF 
NEGATIVE CASTS/LPF NEGATIVE /LPFCRYSTALS NEGATIVE MUCOUS THRDS NEGATIVE BACTERIA
 NEGATIVE EPITH CELLS FEW SQUAMOUS /HPFTRICHOMONAS NEGATIVE YEAST NEGATIVE 

 

                          2016 6:00 AM        GLUCOSE 91.0 mg/dLSODIUM 143.0 mmol/LPOTASSIUM 3.60 mmol/LCHLORIDE

 112.0 mmol/LCO2 23.0 mmol/LBUN 22.0 mg/dLCREATININE 1.20 mg/dLSGOT/AST 15.0 
IU/LSGPT/ALT 12.0 IU/LALK PHOS 61.0 IU/LTOTAL PROTEIN 5.40 g/dLALBUMIN 3.10 
g/dLTOTAL BILI 0.40 mg/dLCALCIUM 8.40 mg/dLAGE 66 GFR NonAA 45 GFR AA 55 eGFR 45
 eGFR AA* 55 WBC 3.0 RBC 3.05 HGB 9.80 g/dLHCT 32.10 %.0 fLMCH 32.10 
pgMCHC 30.50 g/dLRDW SD 54 RDW CV 14.20 %MPV 10.10 fLPLT 170 NRBC# 0.00 NRBC% 
0.0 %NEUT 50.70 %%LYMP 32.60 %%MONO 10.50 %%EOS 5.90 %%BASO 0.30 %#NEUT 1.54 
#LYMP 0.99 #MONO 0.32 #EOS 0.18 #BASO 0.01 MANUAL DIFF NOT IND 

 

                          2016 2:09 PM        COLOR YELLOW APPEARANCE CLEAR SPEC GRAV 1.010 pH 5.0 PROTEIN

 30 GLUCOSE NEGATIVE mg/dLKETONE NEGATIVE BILIRUBIN NEGATIVE BLOOD LARGE NITRITE
 NEGATIVE [place]UK SCREEN MODERATE MICRO INDICATED? SEE BELOW WBC/HPF  RBC/HPF
 20-50 CASTS/LPF NEGATIVE /LPFCRYSTALS NEGATIVE MUCOUS THRDS NEGATIVE BACTERIA 
NEGATIVE EPITH CELLS FEW SQUAMOUS /HPFTRICHOMONAS NEGATIVE YEAST NEGATIVE CULT 
SET UP? YES 

 

                          1/3/2017 4:08 PM          COLOR YELLOW APPEARANCE HAZY SPEC GRAV 1.015 pH 6.0 PROTEIN 30

 GLUCOSE NEGATIVE mg/dLKETONE NEGATIVE BILIRUBIN NEGATIVE BLOOD MODERATE NITRITE
 NEGATIVE [place]UK SCREEN LARGE MICRO INDICATED? SEE BELOW WBC/-200 RBC/HPF 
5-10 CASTS/LPF NEGATIVE /LPFCRYSTALS NEGATIVE MUCOUS THRDS NEGATIVE BACTERIA 
NEGATIVE EPITH CELLS 1+ SQUAMOUS /HPFTRICHOMONAS NEGATIVE YEAST NEGATIVE CULT 
SET UP? YES 

 

                          2017 4:24 PM         CREATININE 1.50 mg/dLAGE 66 GFR NonAA 35 GFR AA 42 eGFR 35 eGFR

 AA* 42 VITAMIN B12 1324.0 pg/mL

 

                          2017 11:30 AM         GLUCOSE 93.0 mg/dLSODIUM 143.0 mmol/LPOTASSIUM 3.10 mmol/LCHLORIDE

 101.0 mmol/LCO2 29.0 mmol/LBUN 26.0 mg/dLCREATININE 1.50 mg/dLSGOT/AST 23.0 
IU/LSGPT/ALT 13.0 IU/LALK PHOS 66.0 IU/LTOTAL PROTEIN 7.70 g/dLALBUMIN 4.30 
g/dLTOTAL BILI 0.40 mg/dLCALCIUM 10.30 mg/dLAGE 66 GFR NonAA 35 GFR AA 42 eGFR 
35 eGFR AA* 42 TRIGLYCERIDES 147.0 mg/dLCHOLESTEROL 184.0 mg/dLHDL 44.0 mg/dLTOT
 CHOL/HDL 4.2 .0 mg/dLWBC 5.4 RBC 3.98 HGB 12.90 g/dLHCT 40.20 %.0
 fLMCH 32.40 pgMCHC 32.10 g/dLRDW SD 50 RDW CV 13.50 %MPV 9.30 fLPLT 210 NRBC# 
0.00 NRBC% 0.0 %NEUT 54.20 %%LYMP 30.70 %%MONO 9.10 %%EOS 5.20 %%BASO 0.40 
%#NEUT 2.94 #LYMP 1.66 #MONO 0.49 #EOS 0.28 #BASO 0.02 MANUAL DIFF NOT IND FREE 
T4 1.09 COLOR YELLOW APPEARANCE CLEAR SPEC GRAV <=1.005 pH 5.5 PROTEIN NEGATIVE 
GLUCOSE NEGATIVE mg/dLKETONE NEGATIVE BILIRUBIN NEGATIVE BLOOD NEGATIVE NITRITE 
NEGATIVE LEUK SCREEN LARGE MICRO INDICATED? SEE BELOW WBC/HPF 10-20 RBC/HPF 0-5 
CASTS/LPF NEGATIVE /LPFCRYSTALS NEGATIVE MUCOUS THRDS NEGATIVE BACTERIA FEW 
EPITH CELLS 1+ SQUAMOUS /HPFTRICHOMONAS NEGATIVE YEAST NEGATIVE CULT SET UP? YES
 TSH 1.820 uIU/mL

 

                          2017 2:45 PM         COLOR YELLOW APPEARANCE CLEAR SPEC GRAV <=1.005 pH 6.0 PROTEIN

 NEGATIVE GLUCOSE NEGATIVE mg/dLKETONE NEGATIVE BILIRUBIN NEGATIVE BLOOD TRACE-
INTACT NITRITE NEGATIVE LEUK SCREEN SMALL MICRO INDICATED? SEE BELOW WBC/HPF 0-5
 RBC/HPF NEGATIVE CASTS/LPF NEGATIVE /LPFCRYSTALS NEGATIVE MUCOUS THRDS NEGATIVE
 BACTERIA FEW EPITH CELLS FEW SQUAMOUS /HPFTRICHOMONAS NEGATIVE YEAST NEGATIVE 
CULT SET UP? YES 







History Of Immunizations







       Name    Date Admin    Mfg Name    Mfg Code    Trade Name    Lot#    Route    Inj    Vis Given    Vis

 Pub                                    CVX

 

        Influenza    2008    Not Entered    NE      Not Entered            Not Entered    Not Entered

                    1            999

 

        X       12/19/2008    [employer]    MSD     Pneumovax 23            Intramuscular    Not Entered

                    1            999

 

           Influenza    10/15/2010    [employer]    NOV        Fluvirin > 12 Years    

[id number]     Intramuscular    Left Deltoid    10/15/2010    2009    999

 

          X         3/23/2015    [facility]    [facility]       Prevnar 13    [id number]    Intramuscular

                Right Gluteous Medius    3/23/2015       2013       109







History of Past Illness







                    Name                Date of Onset       Comments

 

                    Peritoneal Neoplasm, Malignant                         

 

                    Hyperlipidemia                           

 

                    Bipolar disorder, unspecified                         

 

                    Artificial opening status; colostomy                         

 

                    B12 deficiency                           

 

                    Anemia, Pernicious                         

 

                    Arthritis unspecified                         

 

                    cervical cancer                          

 

                    Artificial opening status; colostomy    2010  1:10PM     

 

                    Bipolar disorder, unspecified    2010  1:10PM     

 

                    Hyperlipidemia      2010  1:10PM     

 

                    Anemia, Pernicious    2010  1:10PM     

 

                    Postoperative Follow-Up    2010  1:55PM     

 

                    Postoperative Follow-Up    Mar  8 2010 10:57AM     

 

                    Artificial opening status; colostomy    Mar  8 2010  1:19PM     

 

                    Peritoneal Neoplasm, Malignant    Mar  8 2010  1:19PM     

 

                    Artificial opening status; colostomy    2010  1:40PM     

 

                    Hyperlipidemia      2010  1:40PM     

 

                    Anemia, Pernicious    2010  1:40PM     

 

                    Peritoneal Neoplasm, Malignant    2010  1:40PM     

 

                    Arthritis unspecified    2010  1:40PM     

 

                    Anemia of Chronic Illness    2010  1:40PM     

 

                    Tinea corporis      2010  3:17PM     

 

                    Bipolar disorder, unspecified    2010  1:33PM     

 

                    Hyperlipidemia      2010  1:33PM     

 

                    Anemia, Pernicious    2010  1:33PM     

 

                    Peritoneal Neoplasm, Malignant    2010  1:33PM     

 

                    B12 deficiency      2010  1:33PM     

 

                    Ethmoidal Sinusitis, Acute    Sep 21 2010  3:53PM     

 

                    Wheezing            Sep 21 2010  3:53PM     

 

                    Flu                 Oct 15 2010  1:40PM     

 

                    Bipolar disorder, unspecified    Oct 15 2010  1:42PM     

 

                    Hyperlipidemia      Oct 15 2010  1:42PM     

 

                    Anemia, Pernicious    Oct 15 2010  1:42PM     

 

                    Peritoneal Neoplasm, Malignant    Oct 15 2010  1:42PM     

 

                    Bipolar disorder, unspecified    2011 12:01PM     

 

                    Hyperlipidemia      2011 12:01PM     

 

                    Anemia, Pernicious    2011 12:01PM     

 

                    Peritoneal Neoplasm, Malignant    2011 12:01PM     

 

                    Bipolar disorder, unspecified    Apr 15 2011 10:55AM     

 

                    Major Depression    2011 10:11AM     

 

                    Bipolar Disorder    2011 10:11AM     

 

                    Cancer              May 10 2011  4:16PM     

 

                    Major Depression    May 10 2011  3:16PM     

 

                    Bipolar Disorder    May 10 2011  3:16PM     

 

                    Hypercalcemia       May 23 2011  2:47PM     

 

                    Bipolar disorder, unspecified    May 23 2011  2:47PM     

 

                    Colon Cancer, Personal History    May 23 2011  2:47PM     

 

                    Bipolar Disorder    May 31 2011  4:39PM     

 

                    Depressive Disorder    2011 10:01AM     

 

                    Vitamin B12 deficiency    2011 10:01AM     

 

                    Vitamin D Deficiency    2011  5:07PM     

 

                    Anemia, Vitamin B12 Deficiency    2011  5:07PM     

 

                    B12 deficiency      2011  3:56PM     

 

                    Routine gynecological examination    Aug  4 2011  9:08AM     

 

                    Screening Examination for Breast Cancer    Aug  4 2011  9:08AM     

 

                    Tinea Corporis      Aug  4 2011  9:08AM     

 

                    Depressive Disorder    Sep 23 2011  8:47AM     

 

                    Contact Dermatitis    Sep 23 2011  8:47AM     

 

                    Anemia, Pernicious    Sep 23 2011  8:47AM     

 

                    B12 deficiency      Sep 23 2011  8:47AM     

 

                    B12 deficiency      Sep 27 2011  2:58PM     

 

                    B12 deficiency      Oct 20 2011  2:34PM     

 

                    Flu                 Dec  9 2011  3:16PM     

 

                    B12 deficiency      Dec  9 2011  3:17PM     

 

                    B12 deficiency      2012  4:52PM     

 

                    B12 deficiency      2012 11:10AM     

 

                    B12 deficiency      2012  3:37PM     

 

                    B12 deficiency      May  3 2012  4:10PM     

 

                    B12 deficiency      2012  2:54PM     

 

                    B12 deficiency      2012 11:23AM     

 

                    B12 deficiency      Aug  9 2012  2:08PM     

 

                    B12 deficiency      Sep  6 2012  4:36PM     

 

                    B12 deficiency      Oct 16 2012 10:23AM     

 

                    Flu                 Feb  2013  3:11PM     

 

                    Bipolar disorder, unspecified    Feb  2013  2:48PM     

 

                    Anemia, Pernicious    Feb  2013  2:48PM     

 

                    B12 deficiency      Feb  2013  2:48PM     

 

                    Extrapyramidal abnormal movement disorder    Feb  4   2:48PM     

 

                    B12 deficiency      Apr  3 2013 12:03PM     

 

                    Bipolar disorder, unspecified    May  7 2013  1:31PM     

 

                    Anemia, Pernicious    May  7 2013  1:31PM     

 

                    B12 deficiency      May  7 2013  1:31PM     

 

                    Extrapyramidal abnormal movement disorder    May  7 2013  1:31PM     

 

                    B12 deficiency      2013  3:42PM     

 

                    B12 deficiency      2013  1:31PM     

 

                    Hyperlipidemia      Aug  7 2013 10:37AM     

 

                    Vitamin D Deficiency    Aug  7 2013 10:37AM     

 

                    Bipolar disorder, unspecified    Aug  7 2013 10:37AM     

 

                    Anemia, Pernicious    Aug  7 2013 10:37AM     

 

                    B12 deficiency      Aug  7 2013 10:37AM     

 

                    B12 deficiency      Sep 25 2013 11:15AM     

 

                    B12 deficiency      Dec 11 2013  3:16PM     

 

                    B12 deficiency      Mar  6 2014  1:48PM     

 

                    B12 deficiency      May 21 2014  3:17PM     

 

                    Screening Examination for Breast Cancer    2014  3:23PM     

 

                    Periumbilical abdominal pain    2014  3:23PM     

 

                    B12 deficiency      Jul 10 2014  2:52PM     

 

                    Anemia, Vitamin B12 Deficiency    Aug 13 2014  4:50PM     

 

                    Bipolar disorder    Oct 16 2014 11:13AM     

 

                    Hyperlipidemia      Oct 16 2014 11:13AM     

 

                    Anemia, Pernicious    Oct 16 2014 11:13AM     

 

                    Peritoneal Neoplasm, Malignant    Oct 16 2014 11:13AM     

 

                    Screening breast examination    Oct 16 2014 11:13AM     

 

                    Weight loss         Oct 16 2014 11:13AM     

 

                    Anemia, Pernicious    Mar 23 2015  2:57PM     

 

                    B12 deficiency      Mar 23 2015  2:57PM     

 

                    Need for Prevnar vaccine    Mar 23 2015  2:57PM     

 

                    Bipolar disorder    Mar 23 2015  2:57PM     

 

                    Hyperlipidemia      Mar 23 2015  2:57PM     

 

                    Anemia, Pernicious    Mar 23 2015  2:57PM     

 

                    Peritoneal Neoplasm, Malignant    Mar 23 2015  2:57PM     

 

                    B12 deficiency      May  4 2015  4:48PM     

 

                    Hyperlipidemia      May 13 2015  9:56AM     

 

                    Anemia              May 13 2015  9:56AM     

 

                    Bipolar disorder    May 13 2015  9:56AM     

 

                    Bipolar disorder    May 14 2015  3:27PM     

 

                    Hyperlipidemia      May 14 2015  3:27PM     

 

                    Anemia, Pernicious    May 14 2015  3:27PM     

 

                    Peritoneal Neoplasm, Malignant    May 14 2015  3:27PM     

 

                    B12 deficiency      2015  2:20PM     

 

                    B12 deficiency      2015 11:34AM     

 

                    B12 deficiency      Aug 18 2015  9:06AM     

 

                    Tinea Corporis      Sep 18 2015  8:54AM     

 

                    B12 deficiency      Sep 18 2015  8:54AM     

 

                    B12 deficiency      2015 10:28AM     

 

                    Herpes zoster without complication    Dec  3 2015  9:52AM     

 

                    B12 deficiency      Dec 23 2015 11:21AM     

 

                    B12 deficiency      2016  4:51PM     

 

                    Vitamin B 12 deficiency    Mar 14 2016  5:35PM     

 

                    B12 deficiency      Mar 15 2016 12:14PM     

 

                    B12 deficiency      May  5 2016 11:30AM     

 

                    Edema               May  5 2016 11:30AM     

 

                    Dermatitis          May  5 2016 11:30AM     

 

                    Edema               May 17 2016  8:38AM     

 

                    Shortness of breath    May 17 2016  8:38AM     

 

                    Bilateral edema of lower extremity    2016  2:06PM     

 

                    B12 deficiency      2016  2:06PM     

 

                    B12 deficiency      2016 11:50AM     

 

                    B12 deficiency      2016 11:20AM     

 

                    Diarrhea            Aug  2 2016  3:13PM     

 

                    B12 deficiency      Aug 24 2016 11:10AM     

 

                    Encounter for screening mammogram for breast cancer    Aug 24 2016 11:44AM     

 

                    B12 deficiency      Sep 28 2016  2:35PM     

 

                    B12 deficiency      Dec 15 2016  2:02PM     

 

                    Dysuria             Dec 29 2016 12:14PM     

 

                    Hematuria           Fidencio  3 2017  1:33PM     

 

                    Constipation by delayed colonic transit    2017  1:52PM     

 

                    Ileus               2017  1:52PM     

 

                    UTI (urinary tract infection)    Fidencio 15 2017  3:39PM     

 

                    Acute cystitis with hematuria    2017 11:07AM     

 

                    B12 deficiency      2017 11:07AM     

 

                    B12 deficiency      2017 11:40AM     

 

                    B12 deficiency      2017  4:07PM     

 

                    Slurred speech      2017  3:07PM     

 

                    Vitamin B12 deficiency    2017  3:07PM     

 

                    Dysphagia, unspecified type    2017  3:07PM     

 

                    Hyperlipidemia      2017  3:07PM     

 

                    Dysuria             2017 12:01PM     

 

                    B12 deficiency      2017  2:08PM     

 

                    Dysuria             2017 10:58AM     







Payers







           Insurance Name    Company Name    Plan Name    Plan Number    Policy Number    Policy Group

 Number                                 Start Date

 

                    Medicare RHC Medicare RHC              [id number]              N/A

 

                          [employer]    [employer]                 [id number]

                                                    

 

                    Medicare Part A    Medicare - Lab/Xray              [id number]              2006

 

                    Medicare Part B    Medicare [place]              [id number]              2006

 

                          [employer] Assistance    [employer]                 50 percent

                                                    2009

 

                    [employer]              [id number]              N/A

 

                    Medicare Part A    Medicare Part A              [id number]              N/A

 

                    Medicare Part A    Medicare Part A              [id number]              2006









History of Encounters







                    Visit Date          Visit Type          Provider

 

                    2017           Nurse visit         Bhupinder Aspen DO

 

                    2017           Office visit         

 

                    2017           Office visit        Bhupinder Aspen DO

 

                    2017           Nurse visit         Bhupinder Aspen DO

 

                    2017           Office visit        Radha Ontiveros APRN

 

                    1/15/2017           Office visit        Aj Tapia NP

 

                    2017            Office visit        Devin Masterson MD

 

                    2016          [facility]            Devin Masterson MD

 

                    12/15/2016          Nurse visit         Bhupinder Aspen DO

 

                    2016           Nurse visit         Bret Forte APRN

 

                    2016           Nurse visit         Bhupinder Aspen DO

 

                    2016            Office visit        Bhupinder Aspen DO

 

                    2016           Nurse visit         Bhupinder Aspen DO

 

                    2016           Office visit        Bret Forte APRN

 

                    2016            Office visit        Bhupinder Aspen DO

 

                    3/15/2016           Nurse visit         Bhupinder Aspen DO

 

                    2016            Nurse visit         Bhupinder Aspen DO

 

                    2015          Nurse visit         Bhupinder Aspen DO

 

                    12/3/2015           Office visit        Bhupinder Aspen DO

 

                    2015          Nurse visit         Bhupinder Aspen DO

 

                    2015           Office visit        Bhupinder Aspen DO

 

                    2015           Nurse visit         Bhupinder Aspen DO

 

                    2015            Nurse visit         Bhupinder Aspen DO

 

                    2015            Nurse visit         Bhupinder Aspen DO

 

                    2015           Office visit        Bhupinder Aspen DO

 

                    2015            Nurse visit         Bhupinder Aspen DO

 

                    3/23/2015           Office visit        Bhupinder Aspen DO

 

                    10/16/2014          Office visit        Bhupinder Aspen DO

 

                    2014           Nurse visit         Radha Ontiveros APRN

 

                    7/10/2014           Nurse visit         Bhupinder Aspen DO

 

                    2014           Office visit        Bhupinder Aspen DO

 

                    2014           Nurse visit         Bhupinder Aspen DO

 

                    3/6/2014            Nurse visit         Bhupinder Aspen DO

 

                    2014            [facility]            EARNEST Lopez MD

 

                    2013          Nurse visit         Bhupinder Aspen DO

 

                    2013           Nurse visit         Bhupinder Aspen DO

 

                    2013            Office visit        Bhupinder Aspen DO

 

                    2013            Nurse visit         Bhupinder Aspen DO

 

                    2013            Nurse visit         Bhupinder Aspen DO

 

                    2013            Office visit        Bhupinder Aspen DO

 

                    4/3/2013            Nurse visit         Bhupinder Aspen DO

 

                    2013            Office visit        Bhupinder Aspen DO

 

                    10/16/2012          Nurse visit         Bhupinder Aspen DO

 

                    2012            Nurse visit         Bhupinder Aspen DO

 

                    2012            Voided              Bhupinder Aspen DO

 

                    2012            Nurse visit         Bhupinder Aspen DO

 

                    2012            Nurse visit         Bhupinder Aspen DO

 

                    2012           Nurse visit         Bhupinder Aspen DO

 

                    5/3/2012            Nurse visit         Bhupinder Aspen DO

 

                    2012           Nurse visit         Bhupinder Aspen DO

 

                    2012           Nurse visit         Bhupinder Aspen DO

 

                    2012           Nurse visit         Bhupinder Aspen DO

 

                    2011           Nurse visit         Bhupinder Aspen DO

 

                    10/20/2011          Nurse visit         Bhupinder Aspen DO

 

                    2011           Office visit        Bhupinder Aspen DO

 

                    2011           Nurse visit         Radha GREEN

 

                    2011            Office visit        Bhupinder Aspen DO

 

                    2011           Nurse visit         Bhupinder Aspen DO

 

                    2011            Office visit        Bhupinder Aspen DO

 

                    2011           Office visit        Bhupinder Aspen DO

 

                    5/10/2011           Office visit        Bhupinder Aspen DO

 

                    2011           Office visit        Bhupinder Aspen DO

 

                    4/15/2011           Office visit        Devin Angel DO

 

                    2011           Office visit        Devin Angel DO

 

                    10/15/2010          Office visit        Devin Angel DO

 

                    2010           Office visit        Devin Angel DO

 

                    2010            Office visit        Devin Angel DO

 

                    2010           Office visit        Devin Angel DO

 

                    2010            Office visit        Devin Angel DO

 

                    3/8/2010            Office visit        Devin Masterson MD

 

                    2010            Surgery             Devin Masterson MD

 

                    2010            Office visit        Devin Angel DO

 

                    2010           Surgery             Devin Masterson MD

 

                    2010           Hospital            Devin Masterson MD

 

                    2010           [facility]            Devin Masterson MD

 

                    10/22/2009          Office visit        Devin Angel DO

## 2019-06-26 NOTE — XMS REPORT
MU2 Ambulatory Summary

                             Created on: 2017



Pauline Gan

External Reference #: [id number]

: 1950

Sex: Female



Demographics







                          Address                   [address]

[place]  [address]

 

                          Home Phone                [phone]

 

                          Preferred Language        English

 

                          Marital Status            Legally 

 

                          Congregation Affiliation     Unknown

 

                          Race                      White

 

                          Ethnic Group              Not  or 





Author







                          Author                    Radha Ontiveros

 

                          [facility]

 

                          Address                   [address]

[place], [place]  [address]



 

                          Phone                     [phone]







Care Team Providers







                    Care Team Member Name    Role                Phone

 

                    Radha Ontiveros       PCP                 Unavailable

 

                    Bhupinder Louise    PreferredProvider    Unavailable







Allergies and Adverse Reactions







                    Name                Reaction            Notes

 

                    NO KNOWN DRUG ALLERGIES                         







Plan of Treatment







             Planned Activity    Comments     Planned Date    Planned Time    Plan/Goal

 

             Injection, Subcutaneous/IM                 2016    12:00 AM      

 

             Injection, Subcutaneous/IM                 2016    12:00 AM      

 

             Mammography; bilateral                 2016    12:00 AM      

 

             Injection, Subcutaneous/IM                 2016    12:00 AM      

 

             Urine Culture, [place] Count                 2017    12:00 AM      

 

             CBC with Auto                 2013     12:00 AM      

 

             Lithium                   2013     12:00 AM      

 

             Basic metabolic panel                 2013     12:00 AM      

 

             Vitamin B12 (cyanocobalamin)                 2013     12:00 AM      

 

             Folic acid, serum                 2013     12:00 AM      

 

             Injection,Subcutaneous/Intramuscul                 2013    12:00 AM      







Medications







                                        Active 

 

             Name         Start Date    Estimated Completion Date    SIG          Comments

 

                Latuda 20 mg oral tablet                                    take 1 tablet (20 mg) by oral route once daily with

 food (at least 350 calories)            

 

             pravastatin 40 mg oral tablet    3/30/2015                 TAKE 1 TABLET BY MOUTH DAILY     

 

                Namenda XR 28 mg oral capsule,sprinkle,ER 24hr    2015                       take 1 capsule (28

 mg) by oral route once daily            

 

                Namenda XR 28 mg oral capsule,sprinkle,ER 24hr    2016                       take 1 capsule (28

 mg) by oral route once daily            

 

                triamcinolone acetonide 0.1 % topical cream    2016                        apply a thin layer to 

the affected area(s) by topical route 2 times per day     

 

                potassium chloride 10 mEq oral tablet extended release    2016                       take 1 tablet

 (10 meq) by oral route once daily       

 

             pravastatin 40 mg oral tablet    2016                 TAKE 1 TABLET BY MOUTH DAILY     

 

                Vitamin B-12 1,000 mcg/mL injection solution    2016                       inject 1 milliliter 

(1,000 mcg) by intramuscular route once a month     

 

                potassium chloride 10 mEq oral tablet extended release    2016                      take 1 tablet

 (10 meq) by oral route once daily       

 

                Namenda XR 28 mg oral capsule,sprinkle,ER 24hr    2016                      TAKE 1 CAPSULE BY

 MOUTH EVERY DAY                         

 

                furosemide 40 mg oral tablet    2016                      take 1 tablet (40 mg) by oral route

 once daily                              

 

                metoclopramide HCl 10 mg oral tablet    2017       take 1 tablet by oral

 route 2 times a day for 50 days         

 

                Cipro 500 mg oral tablet    1/15/2017       2017       take 1 tablet (500 mg) by oral route

 every 12 hours for 10 days              

 

                Augmentin 875-125 mg oral tablet    2017       take 1 tablet by oral route

 every 12 hours for 7 days               









                                         

 

             Name         Start Date    Expiration Date    SIG          Comments

 

             Reglan 10mg    3/29/2010    2010    one ac and hs     

 

                Keflex 500 mg oral capsule    2010       10/1/2010       take 1 capsule (500 mg) by oral

 route every 6 hours for 10 days         

 

                Bactrim -160 mg oral tablet    2011       take 1 tablet by oral route

 every 12 hours for 7 days               

 

                triamcinolone acetonide 0.1 % topical cream    2011      apply a thin

 layer to the affected area(s) by topical route 2 times per day     

 

                sertraline 100 mg oral tablet    4/10/2012       5/10/2012       take 1.5 tablets by oral route

 daily for 30 days                       

 

                ergocalciferol (vitamin D2) 50,000 unit oral capsule    4/15/2013       2013       TAKE

 ONE CAPSULE BY MOUTH ONCE A WEEK        

 

                CYANOCOBALAM 1000MCGINJ 1000 milliliter    2013       INJECT 1ML INTRAMUSCULAR

 ONCE A MONTH                            

 

                pravastatin 40 mg oral tablet    3/25/2014       3/20/2015       TAKE ONE TABLET BY MOUTH EVERY

 DAY                                     

 

                          Zostavax (PF) 19,400 unit/0.65 mL subcutaneous suspension for reconstitution    3/23/2015

                    3/24/2015           inject 0.65 milliliter by subcutaneous route once     

 

                famciclovir 500 mg oral tablet    12/3/2015       12/10/2015      take 1 tablet (500 mg) by

 oral route every 8 hours for 7 days     

 

                furosemide 40 mg oral tablet    2016      take 1 tablet (40 mg) by oral

 route once daily                        

 

                Cipro 500 mg oral tablet    2016       take 1 tablet (500 mg) by oral route

 2 times per day for 5 days              

 

                Bactrim -160 mg oral tablet    2016        take 1 tablet by oral route

 every 12 hours for 7 days               

 

                Macrobid 100 mg oral capsule    2017       take 1 capsule (100 mg) by oral

 route 2 times per day with food for 7 days     









                                        Discontinued 

 

             Name         Start Date    Discontinued Date    SIG          Comments

 

                Tylenol 325 mg oral tablet                    2013        take 1 - 2 tablets (325 -650 mg) by oral

 route every 4-6 hours as needed         

 

                Calcium 600 + D(3) 600 mg(1,500mg) -400 unit oral tablet                    2011       take 1 tablet

 by oral route 2 times a day            no longer taking

 

                Vitamin B-12 1,000 mcg oral tablet extended release    2010       take 1

 tablet by oral route daily             no longer taking

 

                Antifungal (clotrimazole) 1 % topical cream    2010       apply to the 

affected and surrounding areas of skin by topical route 2 times per day morning 
and evening                              

 

                sertraline 100 mg oral tablet    5/10/2011       2011       take 2 tablets (200 mg) by 

oral route once daily                   discontinued by Dr. Serrano

 

                mirtazapine 15 mg oral tablet                    2011        take 1 tablet (15 mg) by oral route 

once daily before bedtime               Dr. Serrnao

 

                mirtazapine 15 mg oral tablet                    2011        take 1 tablet (15 mg) by oral route 

once daily before bedtime               dc'd by Dr. Serrano

 

                Pristiq 50 mg oral tablet extended release 24 hr                    2013        take 1 tablet (50

 mg) by oral route once daily           Dr. Serrano

 

                Pristiq 50 mg oral tablet extended release 24 hr                    2013        take 1 tablet (50

 mg) by oral route once daily           dose updated

 

                Vitamin B-12 1,000 mcg/mL injection solution    2011        inject 1 milliliter

 (1,000 mcg) by intramuscular route once a month    on list already

 

                    syringe with needle 1 mL 25 gauge x 1" miscellaneous syringe    2011

                          use for injection once a month     

 

                clotrimazole 1 % topical cream    2011        apply to the affected and surrounding

 areas of skin by topical route 2 times per day in the morning and evening     

 

                Vitamin D2 50,000 unit oral capsule    2011        take 1 capsule (50,000

 unit) by oral route once weekly        generic on list

 

                Pravachol 40 mg oral tablet    2012        take 1 tablet (40 mg) by oral 

route once daily for 90 days            generic on list

 

                lithium carbonate 300 mg oral capsule    2012        take 1 capsule by oral

 route daily                            dose updated

 

                Pristiq 100 mg oral tablet extended release 24 hr                    4/10/2012       take 1 and 1/2 

tablet (150 mg) by oral route once daily    Mental Health provider

 

                Pristiq 100 mg oral tablet extended release 24 hr                    4/10/2012       take 1 and 1/2 

tablet (150 mg) by oral route once daily    Discontinued by Dr Efrain Knight at [facility]

 

                hydroxyzine HCl 50 mg oral tablet    10/16/2014      2015       take 1 tablet (50 mg) 

by oral route at bedtime                 

 

                lithium carbonate 300 mg oral capsule    2015       take 1 capsule (300

 mg) by oral route 2 for 30 days         

 

                fluconazole 100 mg oral tablet    2015       12/3/2015       take 1 tablet (100 mg) by 

oral route once a week                   

 

                ketoconazole 2 % topical cream    2015       12/3/2015       apply to the affected area(s)

 by topical route 2 times per day        

 

                prednisone 10 mg oral tablet    12/3/2015       2016        take 2 tablets (20 mg) by oral

 route once daily for 4 days 1 tablet daily for 4 days 0.5 tablet daily for 4 
days                                     







Problem List







                    Description         Status              Onset

 

                    Artificial opening status; colostomy    Active               

 

                    Bipolar disorder, unspecified    Active               

 

                    Hyperlipidemia      Active               

 

                    Peritoneal Neoplasm, Malignant    Active               

 

                    Anemia, Pernicious    Active               

 

                    Arthritis unspecified    Active               

 

                    B12 deficiency      Active               







Vital Signs







      Date    Time    BP-Sys(mm[Hg]    BP-Lynn(mm[Hg])    HR(bpm)    RR(rpm)    Temp    WT    HT    HC    BMI

                    BSA                 BMI Percentile      O2 Sat(%)

 

        2017    11:07:00 AM    124 mmHg    64 mmHg    62 bpm    17 rpm    98.2 F    181.2 lbs    69

 in                       26.76 kg/m2    2.00 m2                   98 %

 

        1/15/2017    3:34:00 PM    148 mmHg    89 mmHg    69 bpm    20 rpm    98.2 F    179 lbs    69 in

                          26.4334 kg/m    1.9882 m                 98 %

 

       2017    1:51:00 PM    160 mmHg    90 mmHg    100 bpm    20 rpm    96.5 F    179 lbs             

                                                                98 %

 

       2016    3:11:00 PM    134 mmHg    76 mmHg    80 bpm    20 rpm    98 F    163 lbs    69 in     

                24.0706 kg/m    1.8972 m                      98 %

 

        2016    2:04:00 PM    142 mmHg    86 mmHg    68 bpm    16 rpm    98.5 F    166 lbs    63 in

                          29.41 kg/m2    1.83 m2                   100 %

 

        2016    11:27:00 AM    148 mmHg    78 mmHg    90 bpm    20 rpm    98.2 F    153 lbs    69 in

                          22.5939 kg/m    1.8381 m                 96 %

 

        12/3/2015    9:50:00 AM    132 mmHg    70 mmHg    62 bpm    16 rpm    97.9 F    145 lbs    69 in

                          21.41 kg/m2    1.79 m2                   100 %

 

        2015    8:52:00 AM    132 mmHg    68 mmHg    52 bpm    20 rpm    97.8 F    141 lbs    69 in

                          20.8218 kg/m    1.7645 m                 100 %

 

        2015    3:25:00 PM    120 mmHg    62 mmHg    72 bpm    16 rpm    98.1 F    136 lbs    69 in

                          20.08 kg/m2    1.73 m2                   98 %

 

       3/23/2015    2:55:00 PM    130 mmHg    76 mmHg    68 bpm    18 rpm    97 F    140 lbs    69 in    

                20.6742 kg/m    1.7583 m                      98 %

 

        10/16/2014    11:11:00 AM    120 mmHg    66 mmHg    77 bpm    20 rpm    98 F    130 lbs    69 in

                          19.20 kg/m2    1.69 m2                   100 %

 

        2014    3:21:00 PM    130 mmHg    66 mmHg    63 bpm    18 rpm    97.2 F    160 lbs    69 in

                          23.6276 kg/m    1.8797 m                 99 %

 

        2013    10:35:00 AM    132 mmHg    70 mmHg    66 bpm    20 rpm    98.1 F    157 lbs    69 in

                          23.18 kg/m2    1.86 m2                    

 

        2013    1:29:00 PM    132 mmHg    70 mmHg    76 bpm    18 rpm    98.2 F    166 lbs    69 in 

                          24.5137 kg/m    1.9146 m                  

 

       2013    2:46:00 PM    128 mmHg    70 mmHg    76 bpm    16 rpm    98 F    160 lbs    69 in     

                23.63 kg/m2     1.88 m2                          

 

        2011    8:49:00 AM    128 mmHg    78 mmHg    70 bpm    18 rpm    97.9 F    164 lbs    69 in

                          24.2183 kg/m    1.903 m                  

 

     2011    1:31:00 PM    132 mmHg    68 mmHg    84 bpm         97 F    167 lbs                        

                                         

 

        2011    9:09:00 AM    128 mmHg    70 mmHg    72 bpm    18 rpm    98.2 F    163 lbs    64 in 

                          27.9786 kg/m    1.8272 m                  

 

       2011    10:01:00 AM    132 mmHg    70 mmHg    72 bpm    18 rpm    98.2 F    154 lbs             

                                                                 

 

       2011    2:47:00 PM    128 mmHg    70 mmHg    72 bpm    18 rpm    97.8 F    156 lbs             

                                                                 

 

       5/10/2011    3:16:00 PM    144 mmHg    80 mmHg    72 bpm    18 rpm    98.2 F    158 lbs             

                                                                 

 

        2011    10:11:00 AM    132 mmHg    70 mmHg    70 bpm    18 rpm    98.2 F    168 lbs    69 in

                          24.809 kg/m    1.9261 m                  

 

        4/15/2011    10:52:00 AM    110 mmHg    60 mmHg    75 bpm    16 rpm    97.5 F    172.375 lbs    

69 in                     25.46 kg/m2    1.95 m2                   100 %

 

        2011    11:43:00 AM    120 mmHg    82 mmHg    75 bpm    16 rpm    97.2 F    178.5 lbs    69

 in                       26.3596 kg/m    1.9854 m                 100 %

 

        10/15/2010    1:32:00 PM    120 mmHg    70 mmHg    80 bpm    18 rpm    96.6 F    177 lbs    69 in

                          26.14 kg/m2    1.98 m2                   100 %

 

        2010    3:50:00 PM    168 mmHg    100 mmHg    82 bpm    18 rpm    97.8 F    177.5 lbs    69

 in                       26.2119 kg/m    1.9798 m                 97 %

 

        2010    1:21:00 PM    140 mmHg    80 mmHg    59 bpm    16 rpm    97.6 F    173.25 lbs    69 

in                        25.58 kg/m2    1.96 m2                   100 %

 

        2010    3:02:00 PM    140 mmHg    80 mmHg    61 bpm    16 rpm    97.6 F    173.125 lbs    69

 in                       25.5658 kg/m    1.9553 m                 99 %

 

        2010    1:23:00 PM    130 mmHg    80 mmHg    66 bpm    16 rpm    96.8 F    173 lbs    69 in 

                          25.55 kg/m2    1.95 m2                   100 %

 

        2010    12:58:00 PM    130 mmHg    88 mmHg    75 bpm    16 rpm    98.4 F    172.25 lbs    69

 in                       25.4366 kg/m    1.9503 m                 100 %







Social History







                    Name                Description         Comments

 

                    denies alcohol use                         

 

                    denies smoking                           

 

                    Denies illicit substance abuse                         

 

                    retired                                 direct care

 

                    Single                                   

 

                    Exercises regularly                         

 

                    Attended some college                         

 

                    Tobacco             Never smoker         







History of Procedures







                    Date Ordered        Description         Order Status

 

                    2010 12:00 AM    COMPREHEN METABOLIC PANEL    Reviewed

 

                    2010 12:00 AM    COMPLETE CBC W/AUTO DIFF WBC    Reviewed

 

                    2010 12:00 AM    LIPID PANEL         Reviewed

 

                          2015 12:00 AM        B12 Injection, Up to 1000 Mcg NDC#3269-5738-37 [facility] 

                                        Reviewed

 

                    2011 12:00 AM    MAMMOGRAM SCREENING    Reviewed

 

                    2011 12:00 AM    CYTOPATH C/V THIN LAYER    Reviewed

 

                    2011 12:00 AM    B12 Injection 1 cc NDC#[address]-76    Reviewed

 

                    2015 12:00 AM    THER/PROPH/DIAG INJ SC/IM    Reviewed

 

                    2015 12:00 AM    B12 Injection, Up to 1000 Mcg NDC#8603-7472-50    Reviewed

 

                    2011 12:00 AM    THER/PROPH/DIAG INJ SC/IM    Reviewed

 

                    2011 12:00 AM    B12 Injection([place]) Ndc#9027-7249-29-    Reviewed

 

                    2015 12:00 AM    THER/PROPH/DIAG INJ SC/IM    Reviewed

 

                    2015 12:00 AM    B12 Injection, Up to 1000 Mcg NDC#0794-8216-41    Reviewed

 

                    10/20/2011 12:00 AM    THER/PROPH/DIAG INJ SC/IM    Reviewed

 

                    10/20/2011 12:00 AM    B12 Injection([place]) Ndc#8331-8178-90-    Reviewed

 

                    2016 12:00 AM    THER/PROPH/DIAG INJ SC/IM    Reviewed

 

                    2016 12:00 AM    B12 Injection, Up to 1000 Mcg NDC#0473-9991-89    Reviewed

 

                    3/14/2016 12:00 AM    VITAMIN B-12        Reviewed

 

                    3/15/2016 12:00 AM    THER/PROPH/DIAG INJ SC/IM    Reviewed

 

                    3/15/2016 12:00 AM    B12 Injection, Up to 1000 Mcg NDC#5691-8918-80    Reviewed

 

                    2011 12:00 AM    ***Immunization administration, Medicare flu    Reviewed

 

                    2011 12:00 AM    Fluzone ** MEDICARE Only **    Reviewed

 

                    2011 12:00 AM    THER/PROPH/DIAG INJ SC/IM    Reviewed

 

                    2011 12:00 AM    B12 Injection ([employer] [facility]) Ndc#2451-1426-33    Reviewed

 

                    2016 12:00 AM    B12 Injection, Up to 1000 Mcg NDC#9957-1718-12 [facility]    

Reviewed

 

                    2016 12:00 AM    TTE W/DOPPLER COMPLETE    Reviewed

 

                    2016 12:00 AM    EXTREMITY STUDY     Returned

 

                          2016 12:00 AM        B12 Injection, Up to 1000 Mcg NDC#8487-6283-56 [facility] 

                                        Reviewed

 

                    2016 12:00 AM    THER/PROPH/DIAG INJ SC/IM    Reviewed

 

                    2012 12:00 AM    THER/PROPH/DIAG INJ SC/IM    Reviewed

 

                    2012 12:00 AM    B12 Injection ([employer]) Ndc#8703-4970-98    Reviewed

 

                    2016 12:00 AM    THER/PROPH/DIAG INJ SC/IM    Reviewed

 

                    2016 12:00 AM    B12 Injection, Up to 1000 Mcg NDC#0290-7568-82    Reviewed

 

                    2012 12:00 AM    THER/PROPH/DIAG INJ SC/IM    Reviewed

 

                    2012 12:00 AM    B12 Injection([place]) Ndc#1756-0295-89-    Reviewed

 

                    12/15/2016 12:00 AM    B12 Injection, Up to 1000 Mcg NDC#5856-3320-93    Reviewed

 

                    12/15/2016 12:00 AM    THER/PROPH/DIAG INJ SC/IM    Reviewed

 

                    2016 12:00 AM    URNLS DIP STICK/TABLET RGNT AUTO W/O MICROSCOPY    Returned

 

                    1/3/2017 12:00 AM    URNLS DIP STICK/TABLET RGNT AUTO W/O MICROSCOPY    Returned

 

                    5/3/2012 12:00 AM    THER/PROPH/DIAG INJ SC/IM    Reviewed

 

                    5/3/2012 12:00 AM    B12 Injection([place]) Ndc#4810-5809-53-    Reviewed

 

                    2012 12:00 AM    IMMUNOTHERAPY INJECTIONS    Reviewed

 

                    2012 12:00 AM    B12 Injection([place]) Ndc#8373-4185-57-    Reviewed

 

                    2012 12:00 AM    THER/PROPH/DIAG INJ SC/IM    Reviewed

 

                    2012 12:00 AM    B12 Injection, Up to 1000 Mcg NDC#2028-0904-65    Reviewed

 

                    2012 12:00 AM    THER/PROPH/DIAG INJ SC/IM    Reviewed

 

                    2012 12:00 AM    B12 Injection, Up to 1000 Mcg NDC#9714-5038-09    Reviewed

 

                    2012 12:00 AM    THER/PROPH/DIAG INJ SC/IM    Reviewed

 

                    2012 12:00 AM    B12 Injection, Up to 1000 Mcg NDC#6624-1590-70    Reviewed

 

                    10/16/2012 12:00 AM    THER/PROPH/DIAG INJ SC/IM    Reviewed

 

                    10/16/2012 12:00 AM    B12 Injection, Up to 1000 Mcg NDC#9097-2464-65    Reviewed

 

                    2010 12:00 AM    COMPREHEN METABOLIC PANEL    Reviewed

 

                    2010 12:00 AM    COMPLETE CBC W/AUTO DIFF WBC    Reviewed

 

                    2010 12:00 AM    LIPID PANEL         Reviewed

 

                    2013 12:00 AM    Flu Injection 3 Years And Above NDC# [address]-10  [facility]    Reviewed



 

                    2013 12:00 AM    COMPLETE CBC W/AUTO DIFF WBC    Reviewed

 

                    2013 12:00 AM    ASSAY OF LITHIUM    Reviewed

 

                    2013 12:00 AM    METABOLIC PANEL TOTAL CA    Reviewed

 

                    4/3/2013 12:00 AM    THER/PROPH/DIAG INJ SC/IM    Reviewed

 

                    4/3/2013 12:00 AM    B12 Injection, Up to 1000 Mcg NDC#9302-2004-84    Reviewed

 

                    2013 12:00 AM    THER/PROPH/DIAG INJ SC/IM    Reviewed

 

                    2013 12:00 AM    B12 Injection, Up to 1000 Mcg NDC#0149-6467-29    Reviewed

 

                    2013 12:00 AM    THER/PROPH/DIAG INJ SC/IM    Reviewed

 

                    2013 12:00 AM    B12 Injection, Up to 1000 Mcg NDC#2418-4131-56    Reviewed

 

                    2013 12:00 AM    LIPID PANEL         Reviewed

 

                    2013 12:00 AM    VITAMIN D 25 HYDROXY    Reviewed

 

                    2013 12:00 AM    THER/PROPH/DIAG INJ SC/IM    Reviewed

 

                    3/6/2014 12:00 AM    THER/PROPH/DIAG INJ SC/IM    Reviewed

 

                    2014 12:00 AM    THER/PROPH/DIAG INJ SC/IM    Reviewed

 

                    2014 12:00 AM    B12 Injection, Up to 1000 Mcg NDC#0324-5026-77    Reviewed

 

                    2010 12:00 AM    SKIN FUNGI CULTURE    Reviewed

 

                    10/9/2010 12:00 AM    COMPREHEN METABOLIC PANEL    Reviewed

 

                    10/9/2010 12:00 AM    LIPID PANEL         Reviewed

 

                    2010 12:00 AM    THER/PROPH/DIAG INJ SC/IM    Reviewed

 

                    2010 12:00 AM    B12 Injection Ndc#[address]-93 ([facility])    Reviewed

 

                    2010 12:00 AM    THER/PROPH/DIAG INJ SC/IM    Reviewed

 

                    2010 12:00 AM    Kenalog 40 Mg Im-Ndc#[address]-08 ([facility])    Reviewed

 

                    10/15/2010 12:00 AM    FLU VACCINE 3 YRS & > IM    Reviewed

 

                    10/15/2010 12:00 AM    Admin.Of M/C Cov.Vaccine-Flu Vacc.    Reviewed

 

                    1/15/2011 12:00 AM    COMPLETE CBC W/AUTO DIFF WBC    Reviewed

 

                    1/15/2011 12:00 AM    COMPREHEN METABOLIC PANEL    Reviewed

 

                    1/15/2011 12:00 AM    LIPID PANEL         Reviewed

 

                    2014 12:00 AM    MAMMOGRAM SCREENING    Reviewed

 

                    2014 12:00 AM    Screening mammography, bilateral    Reviewed

 

                    7/10/2014 12:00 AM    THER/PROPH/DIAG INJ SC/IM    Reviewed

 

                    7/10/2014 12:00 AM    B12 Injection, Up to 1000 Mcg NDC#7569-5656-67    Reviewed

 

                    2011 12:00 AM    COMPLETE CBC W/AUTO DIFF WBC    Reviewed

 

                    2011 12:00 AM    COMPREHEN METABOLIC PANEL    Reviewed

 

                    2011 12:00 AM    LIPID PANEL         Reviewed

 

                    2014 12:00 AM    B12 Injection, Up to 1000 Mcg NDC#1821-3139-57    Reviewed

 

                    10/19/2014 12:00 AM    MAMMOGRAM SCREENING    Reviewed

 

                    10/19/2014 12:00 AM    Screening mammography, bilateral    Reviewed

 

                    10/16/2014 12:00 AM    COMPLETE CBC W/AUTO DIFF WBC    Reviewed

 

                    10/16/2014 12:00 AM    COMPREHEN METABOLIC PANEL    Reviewed

 

                    10/16/2014 12:00 AM    IMMUNOASSAY TUMOR     Reviewed

 

                    10/16/2014 12:00 AM    LIPID PANEL         Reviewed

 

                    10/16/2014 12:00 AM    ASSAY OF LITHIUM    Reviewed

 

                    10/16/2014 12:00 AM    MAMMOGRAM SCREENING    Reviewed

 

                    2011 12:00 AM    ASSAY OF PARATHORMONE    Reviewed

 

                    2011 12:00 AM    VITAMIN D 25 HYDROXY    Reviewed

 

                    2011 12:00 AM    ASSAY OF LITHIUM    Reviewed

 

                    2011 12:00 AM    METABOLIC PANEL TOTAL CA    Reviewed

 

                    2011 12:00 AM    CT HEAD/BRAIN W/O & W/DYE    Reviewed

 

                    3/23/2015 12:00 AM    PNEUMOCOCCAL VACC 13 GLENDY IM    Reviewed

 

                    3/23/2015 12:00 AM    Vitamin B12 injection    Reviewed

 

                    2011 12:00 AM    ASSAY OF LITHIUM    Reviewed

 

                    2011 12:00 AM    B12 Injection Ndc#[address]-54  [facility]    Reviewed

 

                    2015 12:00 AM    THER/PROPH/DIAG INJ SC/IM    Reviewed

 

                    2015 12:00 AM    B12 Injection, Up to 1000 Mcg NDC#9642-6160-67    Reviewed

 

                    2015 12:00 AM    COMPLETE CBC W/AUTO DIFF WBC    Reviewed

 

                    2015 12:00 AM    COMPREHEN METABOLIC PANEL    Reviewed

 

                    2015 12:00 AM    LIPID PANEL         Reviewed

 

                    2015 12:00 AM    ASSAY OF LITHIUM    Reviewed

 

                    2011 12:00 AM    VIT D 1 25-DIHYDROXY    Reviewed

 

                    2011 12:00 AM    VITAMIN B-12        Reviewed

 

                    2015 12:00 AM    B12 Injection, Up to 1000 Mcg NDC#4499-3880-99    Reviewed

 

                    2015 12:00 AM    THER/PROPH/DIAG INJ SC/IM    Reviewed

 

                    2015 12:00 AM    B12 Injection, Up to 1000 Mcg NDC#8294-4380-20    Reviewed

 

                    2011 12:00 AM    THER/PROPH/DIAG INJ SC/IM    Reviewed

 

                    2011 12:00 AM    B12 Injection ([employer]) Ndc#2981-1264-03    Reviewed

 

                    2015 12:00 AM    THER/PROPH/DIAG INJ SC/IM    Reviewed

 

                    2015 12:00 AM    B12 Injection, Up to 1000 Mcg NDC#2712-1017-74    Reviewed







Results Summary







                          Data and Description      Results

 

                          2004 12:00 AM        Colonoscopy-Women and Men over 50 Normal 

 

                          2008 12:00 AM         Pap Smear Declined 

 

                          10/7/2009 12:00 AM        Cholest Cry Stone Ql .0 %LDLc SerPl-mCnc 123.0 mg/dLHDLc

 SerPl-mCnc 34.0 mg/dLTrigl SerPl-mCnc 190.0 mg/dLGlucose SerPl-mCnc 78.0 mg/dL

 

                          2009 12:00 AM        Mammogram -Women over 40 Normal HIV1+2 Ab Ser Ql no risk 

 

                          2010 8:47 AM         Dexa Bone Scan Refused Aspirin reccommended Contraindication 



 

                          2010 8:48 AM         Depression Done 

 

                          2010 12:00 AM         Foot Exam-Diabetic Done 

 

                          2010 12:00 AM         Cholest Cry Stone Ql .0 %LDLc SerPl-mCnc 126.0 mg/dLGlucose

 SerPl-mCnc 102.0 mg/dL

 

                          2010 8:45 AM          TRIGLYCERIDES 122.0 mg/dLCHOLESTEROL 186.0 mg/dLHDL 36.0 mg/dLTOT

 CHOL/HDL 5.2 LDL (CALC) 126.0 mg/dLGLUCOSE 102.0 mg/dLSODIUM 143.0 
mmol/LPOTASSIUM 3.70 mmol/LCHLORIDE 111.0 mmol/LCO2 23.0 mmol/LBUN 10.0 
mg/dLCREATININE 0.80 mg/dLSGOT/AST 12.0 IU/LSGPT/ALT 11.0 IU/LALK PHOS 65.0 
IU/LTOTAL PROTEIN 7.20 g/dLALBUMIN 3.90 g/dLTOTAL BILI 0.50 mg/dLCALCIUM 10.20 
mg/dLAGE 59 GFR NonAA 73 GFR AA 88 eGFR >60 mL/min/1.73 m2eGFR AA* >60 WBC 5.7 
RBC 3.26 HGB 10.60 g/dLHCT 31.70 %MCV 97.0 fLMCH 32.50 pgMCHC 33.40 g/dLRDW SD 
47 RDW CV 13.30 %MPV 9.70 fLPLT 287 NRBC# 0.00 NRBC% 0.0 %NEUT 62.90 %%LYMP 
21.80 %%MONO 9.90 %%EOS 5.0 %%BASO 0.40 %#NEUT 3.56 #LYMP 1.23 #MONO 0.56 #EOS 
0.28 #BASO 0.02 MANUAL DIFF NOT IND 

 

                          2010 12:00 AM        Glucose SerPl-mCnc 96.0 mg/dLCholest Cry Stone Ql .0 %LDLc

 SerPl-mCnc 146.0 mg/dL

 

                          2010 8:26 AM         TRIGLYCERIDES 106.0 mg/dLCHOLESTEROL 199.0 mg/dLHDL 32.0 mg/dLTOT

 CHOL/HDL 6.2 LDL (CALC) 146.0 mg/dLGLUCOSE 96.0 mg/dLSODIUM 143.0 
mmol/LPOTASSIUM 4.0 mmol/LCHLORIDE 113.0 mmol/LCO2 24.0 mmol/LBUN 13.0 
mg/dLCREATININE 1.0 mg/dLSGOT/AST 11.0 IU/LSGPT/ALT 6.0 IU/LALK PHOS 56.0 
IU/LTOTAL PROTEIN 6.60 g/dLALBUMIN 3.80 g/dLTOTAL BILI 0.50 mg/dLCALCIUM 9.30 
mg/dLAGE 59 GFR NonAA 57 GFR AA 69 eGFR 57 eGFR AA* >60 

 

                          10/6/2010 12:00 AM        Cholest Cry Stone Ql .0 %LDLc SerPl-mCnc 111.0 mg/dLGlucose

 SerPl-mCnc 81.0 mg/dL

 

                          10/6/2010 2:45 PM         TRIGLYCERIDES 123.0 mg/dLCHOLESTEROL 178.0 mg/dLHDL 42.0 mg/dLTOT

 CHOL/HDL 4.2 LDL (CALC) 111.0 mg/dLGLUCOSE 81.0 mg/dLSODIUM 139.0 
mmol/LPOTASSIUM 4.10 mmol/LCHLORIDE 106.0 mmol/LCO2 24.0 mmol/LBUN 13.0 
mg/dLCREATININE 0.90 mg/dLSGOT/AST 13.0 IU/LSGPT/ALT 11.0 IU/LALK PHOS 61.0 
IU/LTOTAL PROTEIN 7.10 g/dLALBUMIN 3.90 g/dLTOTAL BILI 0.30 mg/dLCALCIUM 9.30 
mg/dLAGE 60 GFR NonAA 64 GFR AA 78 eGFR >60 mL/min/1.73 m2eGFR AA* >60 WBC 6.9 
RBC 3.59 HGB 11.50 g/dLHCT 35.30 %MCV 98.0 fLMCH 32.0 pgMCHC 32.60 g/dLRDW SD 46
 RDW CV 12.90 %MPV 9.90 fLPLT 311 NRBC# 0.00 NRBC% 0.0 %NEUT 64.90 %%LYMP 22.50 
%%MONO 7.20 %%EOS 5.10 %%BASO 0.30 %#NEUT 4.45 #LYMP 1.54 #MONO 0.49 #EOS 0.35 
#BASO 0.02 MANUAL DIFF NOT IND 

 

                          2011 12:00 AM         Mammogram -Women over 40 Ordered 

 

                          2011 10:25 AM        TRIGLYCERIDES 111.0 mg/dLCHOLESTEROL 195.0 mg/dLHDL 43.0 mg/dLTOT

 CHOL/HDL 4.5 LDL (CALC) 130.0 mg/dLWBC 5.3 RBC 3.76 HGB 12.0 g/dLHCT 37.80 %MCV
 101.0 fLMCH 31.90 pgMCHC 31.70 g/dLRDW SD 47 RDW CV 13.0 %MPV 9.70 fLPLT 259 
NRBC# 0.00 NRBC% 0.0 %NEUT 69.0 %%LYMP 17.60 %%MONO 8.30 %%EOS 4.70 %%BASO 0.40 
%#NEUT 3.63 #LYMP 0.93 #MONO 0.44 #EOS 0.25 #BASO 0.02 MANUAL DIFF NOT IND 
GLUCOSE 102.0 mg/dLSODIUM 146.0 mmol/LPOTASSIUM 4.20 mmol/LCHLORIDE 113.0 
mmol/LCO2 23.0 mmol/LBUN 15.0 mg/dLCREATININE 1.0 mg/dLSGOT/AST 12.0 
IU/LSGPT/ALT 17.0 IU/LALK PHOS 60.0 IU/LTOTAL PROTEIN 6.90 g/dLALBUMIN 4.20 
g/dLTOTAL BILI 0.40 mg/dLCALCIUM 9.70 mg/dLAGE 60 GFR NonAA 57 GFR AA 69 eGFR 57
 eGFR AA* >60 

 

                          2011 11:49 AM        Cholest Cry Stone Ql .0 %LDLc SerPl-mCnc 130.0 mg/dLHDLc

 SerPl-mCnc 43.0 mg/dLTrigl SerPl-mCnc 111.0 mg/dLGlucose SerPl-mCnc 102.0 mg/dL

 

                          2011 11:52 AM        Pap Smear Declined 

 

                          2011 11:28 AM        Lithium 2.080 mmol/LGLUCOSE 102.0 mg/dLSODIUM 135.0 mmol/LPOTASSIUM

 3.90 mmol/LCHLORIDE 106.0 mmol/LCO2 21.0 mmol/LBUN 12.0 mg/dLCREATININE 1.30 
mg/dLCALCIUM 10.70 mg/dLAGE 60 GFR NonAA 42 GFR AA 51 eGFR 42 eGFR AA* 51 

 

                          2011 8:58 AM          Lithium 0.690 mmol/L

 

                          2011 2:38 PM         VITAMIN B12 3483.0 pg/mL

 

                          2013 3:35 PM          WBC 5.1 RBC 3.73 HGB 11.70 g/dLHCT 36.40 %MCV 98.0 fLMCH 31.40

 pgMCHC 32.10 g/dLRDW SD 47 RDW CV 13.10 %MPV 9.80 fLPLT 224 NRBC# 0.00 NRBC% 
0.0 %NEUT 66.80 %%LYMP 19.10 %%MONO 9.0 %%EOS 4.90 %%BASO 0.20 %#NEUT 3.42 #LYMP
 0.98 #MONO 0.46 #EOS 0.25 #BASO 0.01 MANUAL DIFF NOT IND GLUCOSE 88.0 
mg/dLSODIUM 141.0 mmol/LPOTASSIUM 4.10 mmol/LCHLORIDE 110.0 mmol/LCO2 22.0 
mmol/LBUN 22.0 mg/dLCREATININE 1.10 mg/dLCALCIUM 9.80 mg/dLAGE 62 GFR NonAA 50 
GFR AA 61 eGFR 50 eGFR AA* 60 Lithium 0.760 mmol/L

 

                          2013 11:02 AM        TRIGLYCERIDES 106.0 mg/dLCHOLESTEROL 181.0 mg/dLHDL 46.0 mg/dLTOT

 CHOL/HDL 3.9 LDL (CALC) 114.0 mg/dLVITAMIN D 41.10 ng/mL

 

                          10/17/2014 10:10 AM       WBC 5.0 RBC 3.66 HGB 11.60 g/dLHCT 36.80 %.0 fLMCH 31.70

 pgMCHC 31.50 g/dLRDW SD 50 RDW CV 13.50 %MPV 10.10 fLPLT 209 NRBC# 0.00 NRBC% 
0.0 %NEUT 69.20 %%LYMP 21.0 %%MONO 6.40 %%EOS 3.20 %%BASO 0.20 %#NEUT 3.46 #LYMP
 1.05 #MONO 0.32 #EOS 0.16 #BASO 0.01 MANUAL DIFF NOT IND GLUCOSE 100.0 
mg/dLSODIUM 148.0 mmol/LPOTASSIUM 3.90 mmol/LCHLORIDE 114.0 mmol/LCO2 26.0 
mmol/LBUN 12.0 mg/dLCREATININE 1.20 mg/dLSGOT/AST 9.0 IU/LSGPT/ALT <6 IU/LALK 
PHOS 82.0 IU/LTOTAL PROTEIN 6.90 g/dLALBUMIN 4.0 g/dLTOTAL BILI 0.40 
mg/dLCALCIUM 10.50 mg/dLAGE 64 GFR NonAA 45 GFR AA 55 eGFR 45 eGFR AA* 55 
TRIGLYCERIDES 96.0 mg/dLCHOLESTEROL 155.0 mg/dLHDL 38.0 mg/dLTOT CHOL/HDL 4.1 
LDL (CALC) 98.0 mg/dLLithium 0.850 mmol/LCancer Antigen (CA) 125 8.30 U/mL

 

                          2015 10:25 AM        Lithium 0.790 mmol/LWBC 4.8 RBC 3.44 HGB 11.0 g/dLHCT 35.20 

%.0 fLMCH 32.0 pgMCHC 31.30 g/dLRDW SD 53 RDW CV 14.0 %MPV 9.30 fLPLT 210
 NRBC# 0.00 NRBC% 0.0 %NEUT 70.80 %%LYMP 17.20 %%MONO 8.10 %%EOS 3.50 %%BASO 
0.40 %#NEUT 3.41 #LYMP 0.83 #MONO 0.39 #EOS 0.17 #BASO 0.02 MANUAL DIFF NOT IND 
TRIGLYCERIDES 107.0 mg/dLCHOLESTEROL 174.0 mg/dLHDL 43.0 mg/dLTOT CHOL/HDL 4.0 
LDL (CALC) 110.0 mg/dLGLUCOSE 90.0 mg/dLSODIUM 145.0 mmol/LPOTASSIUM 3.80 
mmol/LCHLORIDE 115.0 mmol/LCO2 24.0 mmol/LBUN 17.0 mg/dLCREATININE 1.30 
mg/dLSGOT/AST 18.0 IU/LSGPT/ALT 17.0 IU/LALK PHOS 56.0 IU/LTOTAL PROTEIN 6.70 
g/dLALBUMIN 3.90 g/dLTOTAL BILI 0.40 mg/dLCALCIUM 9.80 mg/dLAGE 64 GFR NonAA 41 
GFR AA 50 eGFR 41 eGFR AA* 50 

 

                          2015 8:50 AM        WBC 5.8 RBC 3.29 HGB 10.70 g/dLHCT 34.0 %.0 fLMCH 32.50

 pgMCHC 31.50 g/dLRDW SD 52 RDW CV 13.60 %MPV 9.60 fLPLT 223 NRBC# 0.00 NRBC% 
0.0 %NEUT 69.60 %%LYMP 18.90 %%MONO 8.50 %%EOS 2.80 %%BASO 0.20 %#NEUT 4.03 
#LYMP 1.09 #MONO 0.49 #EOS 0.16 #BASO 0.01 MANUAL DIFF NOT IND Lithium 0.620 
mmol/LGLUCOSE 83.0 mg/dLSODIUM 139.0 mmol/LPOTASSIUM 3.90 mmol/LCHLORIDE 109.0 
mmol/LCO2 22.0 mmol/LBUN 19.0 mg/dLCREATININE 1.40 mg/dLSGOT/AST 19.0 
IU/LSGPT/ALT 21.0 IU/LALK PHOS 55.0 IU/LTOTAL PROTEIN 6.50 g/dLALBUMIN 3.90 
g/dLTOTAL BILI 0.50 mg/dLCALCIUM 9.60 mg/dLAGE 65 GFR NonAA 38 GFR AA 46 eGFR 38
 eGFR AA* 46 TRIGLYCERIDES 121.0 mg/dLCHOLESTEROL 192.0 mg/dLHDL 51.0 mg/dLTOT 
CHOL/HDL 3.8 .0 mg/dLFREE T4 0.79 TSH 1.210 uIU/mLHemoglobin A1c 5.40 
%Estim. Avg Glu (eAG) 108 

 

                          3/15/2016 8:08 AM         VITAMIN B12 696.0 pg/mL

 

                          3/23/2016 8:26 AM         WBC 7.0 RBC 3.61 HGB 11.80 g/dLHCT 37.70 %.0 fLMCH 32.70

 pgMCHC 31.30 g/dLRDW SD 49 RDW CV 12.50 %MPV 10.0 fLPLT 207 NRBC# 0.00 NRBC% 
0.0 %NEUT 73.60 %%LYMP 16.40 %%MONO 6.60 %%EOS 3.0 %%BASO 0.30 %#NEUT 5.15 #LYMP
 1.15 #MONO 0.46 #EOS 0.21 #BASO 0.02 MANUAL DIFF NOT IND Lithium 0.940 
mmol/LGLUCOSE 108.0 mg/dLSODIUM 143.0 mmol/LPOTASSIUM 4.30 mmol/LCHLORIDE 110.0 
mmol/LCO2 27.0 mmol/LBUN 16.0 mg/dLCREATININE 1.60 mg/dLSGOT/AST 13.0 
IU/LSGPT/ALT 7.0 IU/LALK PHOS 71.0 IU/LTOTAL PROTEIN 6.80 g/dLALBUMIN 4.0 
g/dLTOTAL BILI 0.20 mg/dLCALCIUM 10.40 mg/dLAGE 65 GFR NonAA 32 GFR AA 39 eGFR 
32 eGFR AA* 39 TRIGLYCERIDES 113.0 mg/dLCHOLESTEROL 169.0 mg/dLHDL 42.0 mg/dLTOT
 CHOL/HDL 4.0 LDL (CALC) 104.0 mg/dLFREE T4 0.86 TSH 2.20 uIU/mLHemoglobin A1c 
5.20 %Estim. Avg Glu (eAG) 103 

 

                          3/25/2016 9:17 AM         COLOR YELLOW APPEARANCE CLEAR SPEC GRAV 1.010 pH 7.0 PROTEIN 

NEGATIVE GLUCOSE NEGATIVE mg/dLKETONE NEGATIVE BILIRUBIN NEGATIVE BLOOD NEGATIVE
 NITRITE NEGATIVE [place] SCREEN SMALL MICRO IND? SEE BELOW WBC/HPF 0-5 RBC/HPF 
NEGATIVE CASTS/LPF NEGATIVE /LPFCRYSTALS NEGATIVE MUCOUS THRDS NEGATIVE BACTERIA
 NEGATIVE EPITH CELLS FEW SQUAMOUS /HPFTRICHOMONAS NEGATIVE YEAST NEGATIVE 

 

                          2016 6:00 AM        GLUCOSE 91.0 mg/dLSODIUM 143.0 mmol/LPOTASSIUM 3.60 mmol/LCHLORIDE

 112.0 mmol/LCO2 23.0 mmol/LBUN 22.0 mg/dLCREATININE 1.20 mg/dLSGOT/AST 15.0 
IU/LSGPT/ALT 12.0 IU/LALK PHOS 61.0 IU/LTOTAL PROTEIN 5.40 g/dLALBUMIN 3.10 
g/dLTOTAL BILI 0.40 mg/dLCALCIUM 8.40 mg/dLAGE 66 GFR NonAA 45 GFR AA 55 eGFR 45
 eGFR AA* 55 WBC 3.0 RBC 3.05 HGB 9.80 g/dLHCT 32.10 %.0 fLMCH 32.10 
pgMCHC 30.50 g/dLRDW SD 54 RDW CV 14.20 %MPV 10.10 fLPLT 170 NRBC# 0.00 NRBC% 
0.0 %NEUT 50.70 %%LYMP 32.60 %%MONO 10.50 %%EOS 5.90 %%BASO 0.30 %#NEUT 1.54 
#LYMP 0.99 #MONO 0.32 #EOS 0.18 #BASO 0.01 MANUAL DIFF NOT IND 

 

                          2016 2:09 PM        COLOR YELLOW APPEARANCE CLEAR SPEC GRAV 1.010 pH 5.0 PROTEIN

 30 GLUCOSE NEGATIVE mg/dLKETONE NEGATIVE BILIRUBIN NEGATIVE BLOOD LARGE NITRITE
 NEGATIVE [facility] SCREEN MODERATE MICRO INDICATED? SEE BELOW WBC/HPF  RBC/HPF
 20-50 CASTS/LPF NEGATIVE /LPFCRYSTALS NEGATIVE MUCOUS THRDS NEGATIVE BACTERIA 
NEGATIVE EPITH CELLS FEW SQUAMOUS /HPFTRICHOMONAS NEGATIVE YEAST NEGATIVE CULT 
SET UP? YES 

 

                          1/3/2017 4:08 PM          COLOR YELLOW APPEARANCE HAZY SPEC GRAV 1.015 pH 6.0 PROTEIN 30

 GLUCOSE NEGATIVE mg/dLKETONE NEGATIVE BILIRUBIN NEGATIVE BLOOD MODERATE NITRITE
 NEGATIVE [facility] SCREEN LARGE MICRO INDICATED? SEE BELOW WBC/-200 RBC/HPF 
5-10 CASTS/LPF NEGATIVE /LPFCRYSTALS NEGATIVE MUCOUS THRDS NEGATIVE BACTERIA 
NEGATIVE EPITH CELLS 1+ SQUAMOUS /HPFTRICHOMONAS NEGATIVE YEAST NEGATIVE CULT 
SET UP? YES 







History Of Immunizations







       Name    Date Admin    Mfg Name    Mfg Code    Trade Name    Lot#    Route    Inj    Vis Given    Vis

 Pub                                    CVX

 

        Influenza    2008    Not Entered    NE      Not Entered            Not Entered    Not Entered

                    1            999

 

        X       12/19/2008    [employer]    MSD     Pneumovax 23            Intramuscular    Not Entered

                    1            999

 

           Influenza    10/15/2010    [employer]    NOV        Fluvirin > 12 Years    

[id number]     Intramuscular    Left Deltoid    10/15/2010    2009    999

 

          X         3/23/2015    [employer]    [facility]       Prevnar 13    [id number]    Intramuscular

                Right Gluteous Medius    3/23/2015       2013       109







History of Past Illness







                    Name                Date of Onset       Comments

 

                    Peritoneal Neoplasm, Malignant                         

 

                    Hyperlipidemia                           

 

                    Bipolar disorder, unspecified                         

 

                    Artificial opening status; colostomy                         

 

                    B12 deficiency                           

 

                    Anemia, Pernicious                         

 

                    Arthritis unspecified                         

 

                    cervical cancer                          

 

                    Artificial opening status; colostomy    2010  1:10PM     

 

                    Bipolar disorder, unspecified    2010  1:10PM     

 

                    Hyperlipidemia      2010  1:10PM     

 

                    Anemia, Pernicious    2010  1:10PM     

 

                    Postoperative Follow-Up    2010  1:55PM     

 

                    Postoperative Follow-Up    Mar  8 2010 10:57AM     

 

                    Artificial opening status; colostomy    Mar  8 2010  1:19PM     

 

                    Peritoneal Neoplasm, Malignant    Mar  8 2010  1:19PM     

 

                    Artificial opening status; colostomy    2010  1:40PM     

 

                    Hyperlipidemia      2010  1:40PM     

 

                    Anemia, Pernicious    2010  1:40PM     

 

                    Peritoneal Neoplasm, Malignant    2010  1:40PM     

 

                    Arthritis unspecified    2010  1:40PM     

 

                    Anemia of Chronic Illness    2010  1:40PM     

 

                    Tinea corporis      2010  3:17PM     

 

                    Bipolar disorder, unspecified    2010  1:33PM     

 

                    Hyperlipidemia      2010  1:33PM     

 

                    Anemia, Pernicious    2010  1:33PM     

 

                    Peritoneal Neoplasm, Malignant    2010  1:33PM     

 

                    B12 deficiency      2010  1:33PM     

 

                    Ethmoidal Sinusitis, Acute    Sep 21 2010  3:53PM     

 

                    Wheezing            Sep 21 2010  3:53PM     

 

                    Flu                 Oct 15 2010  1:40PM     

 

                    Bipolar disorder, unspecified    Oct 15 2010  1:42PM     

 

                    Hyperlipidemia      Oct 15 2010  1:42PM     

 

                    Anemia, Pernicious    Oct 15 2010  1:42PM     

 

                    Peritoneal Neoplasm, Malignant    Oct 15 2010  1:42PM     

 

                    Bipolar disorder, unspecified    2011 12:01PM     

 

                    Hyperlipidemia      2011 12:01PM     

 

                    Anemia, Pernicious    2011 12:01PM     

 

                    Peritoneal Neoplasm, Malignant    2011 12:01PM     

 

                    Bipolar disorder, unspecified    Apr 15 2011 10:55AM     

 

                    Major Depression    2011 10:11AM     

 

                    Bipolar Disorder    2011 10:11AM     

 

                    Cancer              May 10 2011  4:16PM     

 

                    Major Depression    May 10 2011  3:16PM     

 

                    Bipolar Disorder    May 10 2011  3:16PM     

 

                    Hypercalcemia       May 23 2011  2:47PM     

 

                    Bipolar disorder, unspecified    May 23 2011  2:47PM     

 

                    Colon Cancer, Personal History    May 23 2011  2:47PM     

 

                    Bipolar Disorder    May 31 2011  4:39PM     

 

                    Depressive Disorder    2011 10:01AM     

 

                    Vitamin B12 deficiency    2011 10:01AM     

 

                    Vitamin D Deficiency    2011  5:07PM     

 

                    Anemia, Vitamin B12 Deficiency    2011  5:07PM     

 

                    B12 deficiency      2011  3:56PM     

 

                    Routine gynecological examination    Aug  4 2011  9:08AM     

 

                    Screening Examination for Breast Cancer    Aug  4 2011  9:08AM     

 

                    Tinea Corporis      Aug  4 2011  9:08AM     

 

                    Depressive Disorder    Sep 23 2011  8:47AM     

 

                    Contact Dermatitis    Sep 23 2011  8:47AM     

 

                    Anemia, Pernicious    Sep 23 2011  8:47AM     

 

                    B12 deficiency      Sep 23 2011  8:47AM     

 

                    B12 deficiency      Sep 27 2011  2:58PM     

 

                    B12 deficiency      Oct 20 2011  2:34PM     

 

                    Flu                 Dec  9 2011  3:16PM     

 

                    B12 deficiency      Dec  9 2011  3:17PM     

 

                    B12 deficiency      2012  4:52PM     

 

                    B12 deficiency      2012 11:10AM     

 

                    B12 deficiency      2012  3:37PM     

 

                    B12 deficiency      May  3 2012  4:10PM     

 

                    B12 deficiency      2012  2:54PM     

 

                    B12 deficiency      2012 11:23AM     

 

                    B12 deficiency      Aug  9 2012  2:08PM     

 

                    B12 deficiency      Sep  6 2012  4:36PM     

 

                    B12 deficiency      Oct 16 2012 10:23AM     

 

                    Flu                 b  2013  3:11PM     

 

                    Bipolar disorder, unspecified    Feb  2013  2:48PM     

 

                    Anemia, Pernicious    Feb  2013  2:48PM     

 

                    B12 deficiency      Feb  4   2:48PM     

 

                    Extrapyramidal abnormal movement disorder    Feb  4   2:48PM     

 

                    B12 deficiency      Apr  3 2013 12:03PM     

 

                    Bipolar disorder, unspecified    May  7 2013  1:31PM     

 

                    Anemia, Pernicious    May  7 2013  1:31PM     

 

                    B12 deficiency      May  7 2013  1:31PM     

 

                    Extrapyramidal abnormal movement disorder    May  7 2013  1:31PM     

 

                    B12 deficiency      2013  3:42PM     

 

                    B12 deficiency      2013  1:31PM     

 

                    Hyperlipidemia      Aug  7 2013 10:37AM     

 

                    Vitamin D Deficiency    Aug  7 2013 10:37AM     

 

                    Bipolar disorder, unspecified    Aug  7 2013 10:37AM     

 

                    Anemia, Pernicious    Aug  7 2013 10:37AM     

 

                    B12 deficiency      Aug  7 2013 10:37AM     

 

                    B12 deficiency      Sep 25 2013 11:15AM     

 

                    B12 deficiency      Dec 11 2013  3:16PM     

 

                    B12 deficiency      Mar  6 2014  1:48PM     

 

                    B12 deficiency      May 21 2014  3:17PM     

 

                    Screening Examination for Breast Cancer    2014  3:23PM     

 

                    Periumbilical abdominal pain    2014  3:23PM     

 

                    B12 deficiency      Jul 10 2014  2:52PM     

 

                    Anemia, Vitamin B12 Deficiency    Aug 13 2014  4:50PM     

 

                    Bipolar disorder    Oct 16 2014 11:13AM     

 

                    Hyperlipidemia      Oct 16 2014 11:13AM     

 

                    Anemia, Pernicious    Oct 16 2014 11:13AM     

 

                    Peritoneal Neoplasm, Malignant    Oct 16 2014 11:13AM     

 

                    Screening breast examination    Oct 16 2014 11:13AM     

 

                    Weight loss         Oct 16 2014 11:13AM     

 

                    Anemia, Pernicious    Mar 23 2015  2:57PM     

 

                    B12 deficiency      Mar 23 2015  2:57PM     

 

                    Need for Prevnar vaccine    Mar 23 2015  2:57PM     

 

                    Bipolar disorder    Mar 23 2015  2:57PM     

 

                    Hyperlipidemia      Mar 23 2015  2:57PM     

 

                    Anemia, Pernicious    Mar 23 2015  2:57PM     

 

                    Peritoneal Neoplasm, Malignant    Mar 23 2015  2:57PM     

 

                    B12 deficiency      May  4 2015  4:48PM     

 

                    Hyperlipidemia      May 13 2015  9:56AM     

 

                    Anemia              May 13 2015  9:56AM     

 

                    Bipolar disorder    May 13 2015  9:56AM     

 

                    Bipolar disorder    May 14 2015  3:27PM     

 

                    Hyperlipidemia      May 14 2015  3:27PM     

 

                    Anemia, Pernicious    May 14 2015  3:27PM     

 

                    Peritoneal Neoplasm, Malignant    May 14 2015  3:27PM     

 

                    B12 deficiency      2015  2:20PM     

 

                    B12 deficiency      2015 11:34AM     

 

                    B12 deficiency      Aug 18 2015  9:06AM     

 

                    Tinea Corporis      Sep 18 2015  8:54AM     

 

                    B12 deficiency      Sep 18 2015  8:54AM     

 

                    B12 deficiency      2015 10:28AM     

 

                    Herpes zoster without complication    Dec  3 2015  9:52AM     

 

                    B12 deficiency      Dec 23 2015 11:21AM     

 

                    B12 deficiency      2016  4:51PM     

 

                    Vitamin B 12 deficiency    Mar 14 2016  5:35PM     

 

                    B12 deficiency      Mar 15 2016 12:14PM     

 

                    B12 deficiency      May  5 2016 11:30AM     

 

                    Edema               May  5 2016 11:30AM     

 

                    Dermatitis          May  5 2016 11:30AM     

 

                    Edema               May 17 2016  8:38AM     

 

                    Shortness of breath    May 17 2016  8:38AM     

 

                    Bilateral edema of lower extremity    2016  2:06PM     

 

                    B12 deficiency      2016  2:06PM     

 

                    B12 deficiency      2016 11:50AM     

 

                    B12 deficiency      2016 11:20AM     

 

                    Diarrhea            Aug  2 2016  3:13PM     

 

                    B12 deficiency      Aug 24 2016 11:10AM     

 

                    Encounter for screening mammogram for breast cancer    Aug 24 2016 11:44AM     

 

                    B12 deficiency      Sep 28 2016  2:35PM     

 

                    B12 deficiency      Dec 15 2016  2:02PM     

 

                    Dysuria             Dec 29 2016 12:14PM     

 

                    Hematuria           Fidencio  3 2017  1:33PM     

 

                    Constipation by delayed colonic transit    2017  1:52PM     

 

                    Ileus               2017  1:52PM     

 

                    UTI (urinary tract infection)    Fidencio 15 2017  3:39PM     

 

                    Acute cystitis with hematuria    2017 11:07AM     

 

                    B12 deficiency      2017 11:07AM     







Payers







           Insurance Name    Company Name    Plan Name    Plan Number    Policy Number    Policy Group

 Number                                 Start Date

 

                    Medicare [employer]    Medicare [facility]              [id number]              N/A

 

                          [employer]    [employer]                 [id number]

                                                    

 

                    Medicare Part A    Medicare - Lab/Xray              [id number]              2006

 

                    Medicare Part B    [employer] [place]              [id number]              2006

 

                          [employer]    [employer]                 50 percent

                                                    2009

 

                    [facility]    [employer]              [id number]              N/A

 

                    Medicare Part A    Medicare Part A              [id number]              N/A

 

                    Medicare Part A    Medicare Part A              [id number]              2006









History of Encounters







                    Visit Date          Visit Type          Provider

 

                    2017           Office visit        Radha Ontiveros APRN

 

                    1/15/2017           Office visit        Aj Tapia NP

 

                    2017            Office visit        Devin Masterson MD

 

                    2016          [facility]            Devin Masterson MD

 

                    12/15/2016          Nurse visit         Bhupinder Louise DO

 

                    2016           Nurse visit         Bret GREEN

 

                    2016           Nurse visit         Bhupinder Louise DO

 

                    2016            Office visit        Bhupinder Louise DO

 

                    2016           Nurse visit         Bhupinder Louise DO

 

                    2016           Office visit        Bret Forte APRN

 

                    2016            Office visit        Bhupinder Louise DO

 

                    3/15/2016           Nurse visit         Bhupinder Aspen DO

 

                    2016            Nurse visit         Bhupinder Aspen DO

 

                    2015          Nurse visit         Bhupinder Aspen DO

 

                    12/3/2015           Office visit        Bhupinder Aspen DO

 

                    2015          Nurse visit         Bhupinder Aspen DO

 

                    2015           Office visit        Bhupinder Aspen DO

 

                    2015           Nurse visit         Bhupinder Aspen DO

 

                    2015            Nurse visit         Bhupinder Aspen DO

 

                    2015            Nurse visit         Bhupinder Aspen DO

 

                    2015           Office visit        Bhupinder Aspen DO

 

                    2015            Nurse visit         Bhupinder Aspen DO

 

                    3/23/2015           Office visit        Bhupinder Aspen DO

 

                    10/16/2014          Office visit        Bhupinder Aspen DO

 

                    2014           Nurse visit         Radha Weston GREEN

 

                    7/10/2014           Nurse visit         Bhupinder Aspen DO

 

                    2014           Office visit        Bhupinder Aspen DO

 

                    2014           Nurse visit         Bhupinder Aspen DO

 

                    3/6/2014            Nurse visit         Bhupinder Aspen DO

 

                    2014            [facility]            EARNEST Lopez MD

 

                    2013          Nurse visit         Bhupinder Aspen DO

 

                    2013           Nurse visit         Bhupinder Aspen DO

 

                    2013            Office visit        Bhupinder Aspen DO

 

                    2013            Nurse visit         Bhupinder Aspen DO

 

                    2013            Nurse visit         Bhupinder Aspen DO

 

                    2013            Office visit        Bhupinder Aspen DO

 

                    4/3/2013            Nurse visit         Bhupinder Aspen DO

 

                    2013            Office visit        Bhupinder Aspen DO

 

                    10/16/2012          Nurse visit         Bhupinder Aspen DO

 

                    2012            Nurse visit         Bhupinder Aspen DO

 

                    2012            Voided              Hbupinder Aspen DO

 

                    2012            Nurse visit         Bhupinder Aspen DO

 

                    2012            Nurse visit         Bhupinder Aspen DO

 

                    2012           Nurse visit         Bhupinder Aspen DO

 

                    5/3/2012            Nurse visit         Bhupinder Aspen DO

 

                    2012           Nurse visit         Bhupinder Aspen DO

 

                    2012           Nurse visit         Bhupinder Aspen DO

 

                    2012           Nurse visit         Bhupinder Aspen DO

 

                    2011           Nurse visit         Bhupinder Aspen DO

 

                    10/20/2011          Nurse visit         Bhupinder Louise DO

 

                    2011           Office visit        Bhupinder Louise DO

 

                    2011           Nurse visit         Radha GREEN

 

                    2011            Office visit        Bhupinder Louise DO

 

                    2011           Nurse visit         Bhupinder Louise DO

 

                    2011            Office visit        Bhupinder Aspen DO

 

                    2011           Office visit        Bhupinder Louise DO

 

                    5/10/2011           Office visit        Bhupinder Louise DO

 

                    2011           Office visit        Bhupinder Louise DO

 

                    4/15/2011           Office visit        Devin Angel DO

 

                    2011           Office visit        Devin Angel DO

 

                    10/15/2010          Office visit        Devin Angel DO

 

                    2010           Office visit        Devin Angel DO

 

                    2010            Office visit        Devin Angel DO

 

                    2010           Office visit        Devin Angel DO

 

                    2010            Office visit        Devin Angel DO

 

                    3/8/2010            Office visit        Devin Masterson MD

 

                    2010            Surgery             Devin Masterson MD

 

                    2010            Office visit        Devin Angel DO

 

                    2010           Surgery             Devin Masterson MD

 

                    2010           Hospital            Devin Masterson MD

 

                    2010           [facility]            Devin Masterson MD

 

                    10/22/2009          Office visit        Devin Angel DO

## 2019-06-26 NOTE — XMS REPORT
MU2 Ambulatory Summary

                             Created on: 2017



Pauline Gan

External Reference #: [id number]

: 1950

Sex: Female



Demographics







                          Address                   [address]

[place]  [id number]

 

                          Home Phone                [phone]

 

                          Preferred Language        English

 

                          Marital Status            Legally 

 

                          Latter-day Affiliation     Unknown

 

                          Race                      White

 

                          Ethnic Group              Not  or 





Author







                          Bhupinder Aguilar

 

                          [facility]

 

                          Address                   [address]

[place]  [phone]



 

                          Phone                     [phone]







Care Team Providers







                    Care Team Member Name    Role                Phone

 

                    Bhupinder Louise    PCP                 Unavailable

 

                    Bhupinder Louise    PreferredProvider    Unavailable







Allergies and Adverse Reactions







                    Name                Reaction            Notes

 

                    NO KNOWN DRUG ALLERGIES                         







Plan of Treatment







             Planned Activity    Comments     Planned Date    Planned Time    Plan/Goal

 

             Injection, Subcutaneous/IM                 2016    12:00 AM      

 

             Injection, Subcutaneous/IM                 2016    12:00 AM      

 

             Mammography; bilateral                 2016    12:00 AM      

 

             Injection,Subcutaneous/Intramuscul, [facility]                 2017    12:00 AM      

 

             Swallowing evaluation                 2017    12:00 AM      

 

             CBC with Auto                 2013     12:00 AM      

 

             Lithium                   2013     12:00 AM      

 

             Basic metabolic panel                 2013     12:00 AM      

 

             Vitamin B12 (cyanocobalamin)                 2013     12:00 AM      

 

             Folic acid, serum                 2013     12:00 AM      

 

             Injection,Subcutaneous/Intramuscul                 2013    12:00 AM      







Medications







                                        Active 

 

             Name         Start Date    Estimated Completion Date    SIG          Comments

 

                Latuda 20 mg oral tablet                                    take 1 tablet (20 mg) by oral route once daily with

 food (at least 350 calories)            

 

             pravastatin 40 mg oral tablet    3/30/2015                 TAKE 1 TABLET BY MOUTH DAILY     

 

                Namenda XR 28 mg oral capsule,sprinkle,ER 24hr    2015                       take 1 capsule (28

 mg) by oral route once daily            

 

                Namenda XR 28 mg oral capsule,sprinkle,ER 24hr    2016                       take 1 capsule (28

 mg) by oral route once daily            

 

                potassium chloride 10 mEq oral tablet extended release    2016                       take 1 tablet

 (10 meq) by oral route once daily       

 

             pravastatin 40 mg oral tablet    2016                 TAKE 1 TABLET BY MOUTH DAILY     

 

                Vitamin B-12 1,000 mcg/mL injection solution    2016                       inject 1 milliliter 

(1,000 mcg) by intramuscular route once a month     

 

                potassium chloride 10 mEq oral tablet extended release    2016                      take 1 tablet

 (10 meq) by oral route once daily       

 

                Namenda XR 28 mg oral capsule,sprinkle,ER 24hr    2016                      TAKE 1 CAPSULE BY

 MOUTH EVERY DAY                         

 

                furosemide 40 mg oral tablet    2016                      take 1 tablet (40 mg) by oral route

 once daily                              

 

                mirtazapine 45 mg oral tablet                                    take 1 tablet (45 mg) by oral route once daily

 before bedtime                          

 

             Fish Oil 300-1,000 mg oral capsule                              take 1 capsule by oral route daily     

 

             Vitamin D3 1,000 unit oral tablet                              take 1 tablet by oral route daily     

 

                Calcium 600 600 mg calcium (1,500 mg) oral tablet                                    take 1 tablet by oral route

 daily                                   

 

                Aspirin Low Dose 81 mg oral tablet,delayed release (DR/EC)                                    take 1 tablet 

(81 mg) by oral route once daily         









                                         

 

             Name         Start Date    Expiration Date    SIG          Comments

 

             Reglan 10mg    3/29/2010    2010    one ac and hs     

 

                Keflex 500 mg oral capsule    2010       10/1/2010       take 1 capsule (500 mg) by oral

 route every 6 hours for 10 days         

 

                Bactrim -160 mg oral tablet    2011       take 1 tablet by oral route

 every 12 hours for 7 days               

 

                triamcinolone acetonide 0.1 % topical cream    2011      apply a thin

 layer to the affected area(s) by topical route 2 times per day     

 

                sertraline 100 mg oral tablet    4/10/2012       5/10/2012       take 1.5 tablets by oral route

 daily for 30 days                       

 

                ergocalciferol (vitamin D2) 50,000 unit oral capsule    4/15/2013       2013       TAKE

 ONE CAPSULE BY MOUTH ONCE A WEEK        

 

                CYANOCOBALAM 1000MCGINJ 1000 milliliter    2013       INJECT 1ML INTRAMUSCULAR

 ONCE A MONTH                            

 

                pravastatin 40 mg oral tablet    3/25/2014       3/20/2015       TAKE ONE TABLET BY MOUTH EVERY

 DAY                                     

 

                          Zostavax (PF) 19,400 unit/0.65 mL subcutaneous suspension for reconstitution    3/23/2015

                    3/24/2015           inject 0.65 milliliter by subcutaneous route once     

 

                famciclovir 500 mg oral tablet    12/3/2015       12/10/2015      take 1 tablet (500 mg) by

 oral route every 8 hours for 7 days     

 

                furosemide 40 mg oral tablet    2016      take 1 tablet (40 mg) by oral

 route once daily                        

 

                Cipro 500 mg oral tablet    2016       take 1 tablet (500 mg) by oral route

 2 times per day for 5 days              

 

                Bactrim -160 mg oral tablet    2016        take 1 tablet by oral route

 every 12 hours for 7 days               

 

                metoclopramide HCl 10 mg oral tablet    2017       take 1 tablet by oral

 route 2 times a day for 50 days         

 

                Macrobid 100 mg oral capsule    2017       take 1 capsule (100 mg) by oral

 route 2 times per day with food for 7 days     

 

                Augmentin 875-125 mg oral tablet    2017       take 1 tablet by oral route

 every 12 hours for 7 days               









                                        Discontinued 

 

             Name         Start Date    Discontinued Date    SIG          Comments

 

                Tylenol 325 mg oral tablet                    2013        take 1 - 2 tablets (325 -650 mg) by oral

 route every 4-6 hours as needed         

 

                Calcium 600 + D(3) 600 mg(1,500mg) -400 unit oral tablet                    2011       take 1 tablet

 by oral route 2 times a day            no longer taking

 

                Vitamin B-12 1,000 mcg oral tablet extended release    2010       take 1

 tablet by oral route daily             no longer taking

 

                Antifungal (clotrimazole) 1 % topical cream    2010       apply to the 

affected and surrounding areas of skin by topical route 2 times per day morning 
and evening                              

 

                sertraline 100 mg oral tablet    5/10/2011       2011       take 2 tablets (200 mg) by 

oral route once daily                   discontinued by Dr. Serrano

 

                mirtazapine 15 mg oral tablet                    2011        take 1 tablet (15 mg) by oral route 

once daily before bedtime               Dr. Serrano

 

                mirtazapine 15 mg oral tablet                    2011        take 1 tablet (15 mg) by oral route 

once daily before bedtime               dc'd by Dr. Serrano

 

                Pristiq 50 mg oral tablet extended release 24 hr                    2013        take 1 tablet (50

 mg) by oral route once daily           Dr. Zach Sarahstiq 50 mg oral tablet extended release 24 hr                    2013        take 1 tablet (50

 mg) by oral route once daily           dose updated

 

                Vitamin B-12 1,000 mcg/mL injection solution    2011        inject 1 milliliter

 (1,000 mcg) by intramuscular route once a month    on list already

 

                    syringe with needle 1 mL 25 gauge x 1" miscellaneous syringe    2011

                          use for injection once a month     

 

                clotrimazole 1 % topical cream    2011        apply to the affected and surrounding

 areas of skin by topical route 2 times per day in the morning and evening     

 

                Vitamin D2 50,000 unit oral capsule    2011        take 1 capsule (50,000

 unit) by oral route once weekly        generic on list

 

                Pravachol 40 mg oral tablet    2012        take 1 tablet (40 mg) by oral 

route once daily for 90 days            generic on list

 

                lithium carbonate 300 mg oral capsule    2012        take 1 capsule by oral

 route daily                            dose updated

 

                Pristiq 100 mg oral tablet extended release 24 hr                    4/10/2012       take 1 and 1/2 

tablet (150 mg) by oral route once daily    Mental Health provider

 

                Pristiq 100 mg oral tablet extended release 24 hr                    4/10/2012       take 1 and 1/2 

tablet (150 mg) by oral route once daily    Discontinued by Dr Efrain Knight at [facility]

 

                hydroxyzine HCl 50 mg oral tablet    10/16/2014      2015       take 1 tablet (50 mg) 

by oral route at bedtime                 

 

                lithium carbonate 300 mg oral capsule    2015       take 1 capsule (300

 mg) by oral route 2 for 30 days         

 

                fluconazole 100 mg oral tablet    2015       12/3/2015       take 1 tablet (100 mg) by 

oral route once a week                   

 

                ketoconazole 2 % topical cream    2015       12/3/2015       apply to the affected area(s)

 by topical route 2 times per day        

 

                prednisone 10 mg oral tablet    12/3/2015       2016        take 2 tablets (20 mg) by oral

 route once daily for 4 days 1 tablet daily for 4 days 0.5 tablet daily for 4 
days                                     

 

                triamcinolone acetonide 0.1 % topical cream    2016       apply a thin layer

 to the affected area(s) by topical route 2 times per day     

 

                Cipro 500 mg oral tablet    1/15/2017       2017       take 1 tablet (500 mg) by oral route

 every 12 hours for 10 days              







Problem List







                    Description         Status              Onset

 

                    Artificial opening status; colostomy    Active               

 

                    Bipolar disorder, unspecified    Active               

 

                    Hyperlipidemia      Active               

 

                    Peritoneal Neoplasm, Malignant    Active               

 

                    Anemia, Pernicious    Active               

 

                    Arthritis unspecified    Active               

 

                    B12 deficiency      Active               







Vital Signs







      Date    Time    BP-Sys(mm[Hg]    BP-Lynn(mm[Hg])    HR(bpm)    RR(rpm)    Temp    WT    HT    HC    BMI

                    BSA                 BMI Percentile      O2 Sat(%)

 

        2017    3:05:00 PM    134 mmHg    70 mmHg    70 bpm    20 rpm    97.4 F    181 lbs    69 in

                          26.73 kg/m2    2.00 m2                   98 %

 

        2017    11:07:00 AM    124 mmHg    64 mmHg    62 bpm    17 rpm    98.2 F    181.2 lbs    69

 in                       26.7583 kg/m    2.0003 m                 98 %

 

        1/15/2017    3:34:00 PM    148 mmHg    89 mmHg    69 bpm    20 rpm    98.2 F    179 lbs    69 in

                          26.43 kg/m2    1.99 m2                   98 %

 

       2017    1:51:00 PM    160 mmHg    90 mmHg    100 bpm    20 rpm    96.5 F    179 lbs             

                                                                98 %

 

       2016    3:11:00 PM    134 mmHg    76 mmHg    80 bpm    20 rpm    98 F    163 lbs    69 in     

                24.07 kg/m2     1.90 m2                         98 %

 

        2016    2:04:00 PM    142 mmHg    86 mmHg    68 bpm    16 rpm    98.5 F    166 lbs    63 in

                          29.4053 kg/m    1.8295 m                 100 %

 

        2016    11:27:00 AM    148 mmHg    78 mmHg    90 bpm    20 rpm    98.2 F    153 lbs    69 in

                          22.59 kg/m2    1.84 m2                   96 %

 

        12/3/2015    9:50:00 AM    132 mmHg    70 mmHg    62 bpm    16 rpm    97.9 F    145 lbs    69 in

                          21.4125 kg/m    1.7894 m                 100 %

 

        2015    8:52:00 AM    132 mmHg    68 mmHg    52 bpm    20 rpm    97.8 F    141 lbs    69 in

                          20.82 kg/m2    1.76 m2                   100 %

 

        2015    3:25:00 PM    120 mmHg    62 mmHg    72 bpm    16 rpm    98.1 F    136 lbs    69 in

                          20.0835 kg/m    1.733 m                 98 %

 

       3/23/2015    2:55:00 PM    130 mmHg    76 mmHg    68 bpm    18 rpm    97 F    140 lbs    69 in    

                20.67 kg/m2     1.76 m2                         98 %

 

        10/16/2014    11:11:00 AM    120 mmHg    66 mmHg    77 bpm    20 rpm    98 F    130 lbs    69 in

                          19.1974 kg/m    1.6943 m                 100 %

 

        2014    3:21:00 PM    130 mmHg    66 mmHg    63 bpm    18 rpm    97.2 F    160 lbs    69 in

                          23.6276 kg/m    1.88 m2                   99 %

 

        2013    10:35:00 AM    132 mmHg    70 mmHg    66 bpm    20 rpm    98.1 F    157 lbs    69 in

                          23.18 kg/m2    1.862 m                  

 

        2013    1:29:00 PM    132 mmHg    70 mmHg    76 bpm    18 rpm    98.2 F    166 lbs    69 in 

                          24.5137 kg/m    1.91 m2                    

 

       2013    2:46:00 PM    128 mmHg    70 mmHg    76 bpm    16 rpm    98 F    160 lbs    69 in     

                23.63 kg/m2     1.8797 m                       

 

        2011    8:49:00 AM    128 mmHg    78 mmHg    70 bpm    18 rpm    97.9 F    164 lbs    69 in

                          24.2183 kg/m    1.90 m2                    

 

     2011    1:31:00 PM    132 mmHg    68 mmHg    84 bpm         97 F    167 lbs                        

                                         

 

        2011    9:09:00 AM    128 mmHg    70 mmHg    72 bpm    18 rpm    98.2 F    163 lbs    64 in 

                          27.9786 kg/m    1.83 m2                    

 

       2011    10:01:00 AM    132 mmHg    70 mmHg    72 bpm    18 rpm    98.2 F    154 lbs             

                                                                 

 

       2011    2:47:00 PM    128 mmHg    70 mmHg    72 bpm    18 rpm    97.8 F    156 lbs             

                                                                 

 

       5/10/2011    3:16:00 PM    144 mmHg    80 mmHg    72 bpm    18 rpm    98.2 F    158 lbs             

                                                                 

 

        2011    10:11:00 AM    132 mmHg    70 mmHg    70 bpm    18 rpm    98.2 F    168 lbs    69 in

                          24.809 kg/m    1.93 m2                    

 

        4/15/2011    10:52:00 AM    110 mmHg    60 mmHg    75 bpm    16 rpm    97.5 F    172.375 lbs    

69 in                     25.46 kg/m2    1.951 m                 100 %

 

        2011    11:43:00 AM    120 mmHg    82 mmHg    75 bpm    16 rpm    97.2 F    178.5 lbs    69

 in                       26.3596 kg/m    1.99 m2                   100 %

 

        10/15/2010    1:32:00 PM    120 mmHg    70 mmHg    80 bpm    18 rpm    96.6 F    177 lbs    69 in

                          26.14 kg/m2    1.977 m                 100 %

 

        2010    3:50:00 PM    168 mmHg    100 mmHg    82 bpm    18 rpm    97.8 F    177.5 lbs    69

 in                       26.2119 kg/m    1.98 m2                   97 %

 

        2010    1:21:00 PM    140 mmHg    80 mmHg    59 bpm    16 rpm    97.6 F    173.25 lbs    69 

in                        25.58 kg/m2    1.956 m                 100 %

 

        2010    3:02:00 PM    140 mmHg    80 mmHg    61 bpm    16 rpm    97.6 F    173.125 lbs    69

 in                       25.5658 kg/m    1.96 m2                   99 %

 

        2010    1:23:00 PM    130 mmHg    80 mmHg    66 bpm    16 rpm    96.8 F    173 lbs    69 in 

                          25.55 kg/m2    1.9546 m                 100 %

 

        2010    12:58:00 PM    130 mmHg    88 mmHg    75 bpm    16 rpm    98.4 F    172.25 lbs    69

 in                       25.4366 kg/m    1.95 m2                   100 %







Social History







                    Name                Description         Comments

 

                    denies alcohol use                         

 

                    denies smoking                           

 

                    Denies illicit substance abuse                         

 

                    retired                                 direct care

 

                    Single                                   

 

                    Exercises regularly                         

 

                    Attended some college                         

 

                    Tobacco             Never smoker         







History of Procedures







                    Date Ordered        Description         Order Status

 

                    2010 12:00 AM    COMPREHEN METABOLIC PANEL    Reviewed

 

                    2010 12:00 AM    COMPLETE CBC W/AUTO DIFF WBC    Reviewed

 

                    2010 12:00 AM    LIPID PANEL         Reviewed

 

                          2015 12:00 AM        B12 Injection, Up to 1000 Mcg NDC#4557-9272-87 [facility] 

                                        Reviewed

 

                    2011 12:00 AM    MAMMOGRAM SCREENING    Reviewed

 

                    2011 12:00 AM    CYTOPATH C/V THIN LAYER    Reviewed

 

                    2011 12:00 AM    B12 Injection 1 cc NDC#[address]-88    Reviewed

 

                    2015 12:00 AM    THER/PROPH/DIAG INJ SC/IM    Reviewed

 

                    2015 12:00 AM    B12 Injection, Up to 1000 Mcg NDC#8733-0936-43    Reviewed

 

                    2011 12:00 AM    THER/PROPH/DIAG INJ SC/IM    Reviewed

 

                    2011 12:00 AM    B12 Injection([facility]) Ndc#1335-2927-72-    Reviewed

 

                    2015 12:00 AM    THER/PROPH/DIAG INJ SC/IM    Reviewed

 

                    2015 12:00 AM    B12 Injection, Up to 1000 Mcg NDC#0174-3535-94    Reviewed

 

                    10/20/2011 12:00 AM    THER/PROPH/DIAG INJ SC/IM    Reviewed

 

                    10/20/2011 12:00 AM    B12 Injection([facility]) Ndc#7624-3064-40-    Reviewed

 

                    2016 12:00 AM    THER/PROPH/DIAG INJ SC/IM    Reviewed

 

                    2016 12:00 AM    B12 Injection, Up to 1000 Mcg NDC#5578-9966-86    Reviewed

 

                    3/14/2016 12:00 AM    VITAMIN B-12        Reviewed

 

                    3/15/2016 12:00 AM    THER/PROPH/DIAG INJ SC/IM    Reviewed

 

                    3/15/2016 12:00 AM    B12 Injection, Up to 1000 Mcg NDC#1117-2474-40    Reviewed

 

                    2011 12:00 AM    ***Immunization administration, Medicare flu    Reviewed

 

                    2011 12:00 AM    Fluzone ** MEDICARE Only **    Reviewed

 

                    2011 12:00 AM    THER/PROPH/DIAG INJ SC/IM    Reviewed

 

                    2011 12:00 AM    B12 Injection ([facility]) Ndc#2532-7708-82    Reviewed

 

                    2016 12:00 AM    B12 Injection, Up to 1000 Mcg NDC#3564-1436-51 [facility]    

Reviewed

 

                    2016 12:00 AM    TTE W/DOPPLER COMPLETE    Reviewed

 

                    2016 12:00 AM    EXTREMITY STUDY     Reviewed

 

                          2016 12:00 AM        B12 Injection, Up to 1000 Mcg NDC#2932-9587-90 [facility] 

                                        Reviewed

 

                    2016 12:00 AM    THER/PROPH/DIAG INJ SC/IM    Reviewed

 

                    2012 12:00 AM    THER/PROPH/DIAG INJ SC/IM    Reviewed

 

                    2012 12:00 AM    B12 Injection ([employer] [facility]) Ndc#2816-4646-19    Reviewed

 

                    2016 12:00 AM    THER/PROPH/DIAG INJ SC/IM    Reviewed

 

                    2016 12:00 AM    B12 Injection, Up to 1000 Mcg NDC#7173-1976-39    Reviewed

 

                    2016 12:00 AM    B12 Injection, Up to 1000 Mcg NDC#9156-6483-40    Reviewed

 

                    2016 12:00 AM    THER/PROPH/DIAG INJ SC/IM    Reviewed

 

                    2012 12:00 AM    THER/PROPH/DIAG INJ SC/IM    Reviewed

 

                    2012 12:00 AM    B12 Injection([place]) Ndc#5712-8948-42-    Reviewed

 

                    12/15/2016 12:00 AM    B12 Injection, Up to 1000 Mcg NDC#0811-2302-08    Reviewed

 

                    12/15/2016 12:00 AM    THER/PROPH/DIAG INJ SC/IM    Reviewed

 

                    2016 12:00 AM    URNLS DIP STICK/TABLET RGNT AUTO W/O MICROSCOPY    Returned

 

                    1/3/2017 12:00 AM    URNLS DIP STICK/TABLET RGNT AUTO W/O MICROSCOPY    Returned

 

                    2017 12:00 AM    URINE CULTURE/COLONY COUNT    Returned

 

                    2017 12:00 AM    Rocephin 1 gram Injection, [facility] Medicare    Reviewed

 

                    2017 12:00 AM    THERAPEUTIC PROPHYLACTIC/DX INJECTION SUBQ/IM    Reviewed

 

                    2017 12:00 AM    B12 1000mcg Injection, [facility] Medicare    Reviewed

 

                    5/3/2012 12:00 AM    THER/PROPH/DIAG INJ SC/IM    Reviewed

 

                    5/3/2012 12:00 AM    B12 Injection([facility]) Ndc#8899-2110-90-    Reviewed

 

                    2017 12:00 AM    MRI BRAIN STEM W/O & W/DYE    Reviewed

 

                    2017 12:00 AM    VITAMIN B-12        Reviewed

 

                    2017 12:00 AM    Speech Therapy Consult    Reviewed

 

                    2017 12:00 AM    ASSAY OF CREATININE    Reviewed

 

                    2012 12:00 AM    IMMUNOTHERAPY INJECTIONS    Reviewed

 

                    2012 12:00 AM    B12 Injection([facility]) Ndc#9417-3308-39-    Reviewed

 

                    2012 12:00 AM    THER/PROPH/DIAG INJ SC/IM    Reviewed

 

                    2012 12:00 AM    B12 Injection, Up to 1000 Mcg NDC#8137-0684-99    Reviewed

 

                    2012 12:00 AM    THER/PROPH/DIAG INJ SC/IM    Reviewed

 

                    2012 12:00 AM    B12 Injection, Up to 1000 Mcg NDC#0893-2446-40    Reviewed

 

                    2012 12:00 AM    THER/PROPH/DIAG INJ SC/IM    Reviewed

 

                    2012 12:00 AM    B12 Injection, Up to 1000 Mcg NDC#0019-6626-86    Reviewed

 

                    10/16/2012 12:00 AM    THER/PROPH/DIAG INJ SC/IM    Reviewed

 

                    10/16/2012 12:00 AM    B12 Injection, Up to 1000 Mcg NDC#3188-0928-09    Reviewed

 

                    2010 12:00 AM    COMPREHEN METABOLIC PANEL    Reviewed

 

                    2010 12:00 AM    COMPLETE CBC W/AUTO DIFF WBC    Reviewed

 

                    2010 12:00 AM    LIPID PANEL         Reviewed

 

                    2013 12:00 AM    Flu Injection 3 Years And Above NDC# [address]-25  [facility]    Reviewed



 

                    2013 12:00 AM    COMPLETE CBC W/AUTO DIFF WBC    Reviewed

 

                    2013 12:00 AM    ASSAY OF LITHIUM    Reviewed

 

                    2013 12:00 AM    METABOLIC PANEL TOTAL CA    Reviewed

 

                    4/3/2013 12:00 AM    THER/PROPH/DIAG INJ SC/IM    Reviewed

 

                    4/3/2013 12:00 AM    B12 Injection, Up to 1000 Mcg NDC#6670-8518-50    Reviewed

 

                    2013 12:00 AM    THER/PROPH/DIAG INJ SC/IM    Reviewed

 

                    2013 12:00 AM    B12 Injection, Up to 1000 Mcg NDC#2942-9284-95    Reviewed

 

                    2013 12:00 AM    THER/PROPH/DIAG INJ SC/IM    Reviewed

 

                    2013 12:00 AM    B12 Injection, Up to 1000 Mcg NDC#1606-5661-69    Reviewed

 

                    2013 12:00 AM    LIPID PANEL         Reviewed

 

                    2013 12:00 AM    VITAMIN D 25 HYDROXY    Reviewed

 

                    2013 12:00 AM    THER/PROPH/DIAG INJ SC/IM    Reviewed

 

                    3/6/2014 12:00 AM    THER/PROPH/DIAG INJ SC/IM    Reviewed

 

                    2014 12:00 AM    THER/PROPH/DIAG INJ SC/IM    Reviewed

 

                    2014 12:00 AM    B12 Injection, Up to 1000 Mcg NDC#3774-6567-50    Reviewed

 

                    2010 12:00 AM    SKIN FUNGI CULTURE    Reviewed

 

                    10/9/2010 12:00 AM    COMPREHEN METABOLIC PANEL    Reviewed

 

                    10/9/2010 12:00 AM    LIPID PANEL         Reviewed

 

                    2010 12:00 AM    THER/PROPH/DIAG INJ SC/IM    Reviewed

 

                    2010 12:00 AM    B12 Injection Ndc#[address]-16 ([facility])    Reviewed

 

                    2010 12:00 AM    THER/PROPH/DIAG INJ SC/IM    Reviewed

 

                    2010 12:00 AM    Kenalog 40 Mg Im-Ndc#[address]-98 ([facility])    Reviewed

 

                    10/15/2010 12:00 AM    FLU VACCINE 3 YRS & > IM    Reviewed

 

                    10/15/2010 12:00 AM    Admin.Of M/C Cov.Vaccine-Flu Vacc.    Reviewed

 

                    1/15/2011 12:00 AM    COMPLETE CBC W/AUTO DIFF WBC    Reviewed

 

                    1/15/2011 12:00 AM    COMPREHEN METABOLIC PANEL    Reviewed

 

                    1/15/2011 12:00 AM    LIPID PANEL         Reviewed

 

                    2014 12:00 AM    MAMMOGRAM SCREENING    Reviewed

 

                    2014 12:00 AM    Screening mammography, bilateral    Reviewed

 

                    7/10/2014 12:00 AM    THER/PROPH/DIAG INJ SC/IM    Reviewed

 

                    7/10/2014 12:00 AM    B12 Injection, Up to 1000 Mcg NDC#8647-3305-13    Reviewed

 

                    2011 12:00 AM    COMPLETE CBC W/AUTO DIFF WBC    Reviewed

 

                    2011 12:00 AM    COMPREHEN METABOLIC PANEL    Reviewed

 

                    2011 12:00 AM    LIPID PANEL         Reviewed

 

                    2014 12:00 AM    B12 Injection, Up to 1000 Mcg NDC#9914-9973-11    Reviewed

 

                    10/19/2014 12:00 AM    MAMMOGRAM SCREENING    Reviewed

 

                    10/19/2014 12:00 AM    Screening mammography, bilateral    Reviewed

 

                    10/16/2014 12:00 AM    COMPLETE CBC W/AUTO DIFF WBC    Reviewed

 

                    10/16/2014 12:00 AM    COMPREHEN METABOLIC PANEL    Reviewed

 

                    10/16/2014 12:00 AM    IMMUNOASSAY TUMOR     Reviewed

 

                    10/16/2014 12:00 AM    LIPID PANEL         Reviewed

 

                    10/16/2014 12:00 AM    ASSAY OF LITHIUM    Reviewed

 

                    10/16/2014 12:00 AM    MAMMOGRAM SCREENING    Reviewed

 

                    2011 12:00 AM    ASSAY OF PARATHORMONE    Reviewed

 

                    2011 12:00 AM    VITAMIN D 25 HYDROXY    Reviewed

 

                    2011 12:00 AM    ASSAY OF LITHIUM    Reviewed

 

                    2011 12:00 AM    METABOLIC PANEL TOTAL CA    Reviewed

 

                    2011 12:00 AM    CT HEAD/BRAIN W/O & W/DYE    Reviewed

 

                    3/23/2015 12:00 AM    PNEUMOCOCCAL VACC 13 GLENDY IM    Reviewed

 

                    3/23/2015 12:00 AM    Vitamin B12 injection    Reviewed

 

                    2011 12:00 AM    ASSAY OF LITHIUM    Reviewed

 

                    2011 12:00 AM    B12 Injection Ndc#[address]-61  [facility]    Reviewed

 

                    2015 12:00 AM    THER/PROPH/DIAG INJ SC/IM    Reviewed

 

                    2015 12:00 AM    B12 Injection, Up to 1000 Mcg NDC#5140-8250-09    Reviewed

 

                    2015 12:00 AM    COMPLETE CBC W/AUTO DIFF WBC    Reviewed

 

                    2015 12:00 AM    COMPREHEN METABOLIC PANEL    Reviewed

 

                    2015 12:00 AM    LIPID PANEL         Reviewed

 

                    2015 12:00 AM    ASSAY OF LITHIUM    Reviewed

 

                    2011 12:00 AM    VIT D 1 25-DIHYDROXY    Reviewed

 

                    2011 12:00 AM    VITAMIN B-12        Reviewed

 

                    2015 12:00 AM    B12 Injection, Up to 1000 Mcg NDC#0250-5978-52    Reviewed

 

                    2015 12:00 AM    THER/PROPH/DIAG INJ SC/IM    Reviewed

 

                    2015 12:00 AM    B12 Injection, Up to 1000 Mcg NDC#4347-9792-72    Reviewed

 

                    2011 12:00 AM    THER/PROPH/DIAG INJ SC/IM    Reviewed

 

                    2011 12:00 AM    B12 Injection ([employer] [facility]) Ndc#2203-5992-86    Reviewed

 

                    2015 12:00 AM    THER/PROPH/DIAG INJ SC/IM    Reviewed

 

                    2015 12:00 AM    B12 Injection, Up to 1000 Mcg NDC#3320-0180-54    Reviewed







Results Summary







                          Data and Description      Results

 

                          2004 12:00 AM        Colonoscopy-Women and Men over 50 Normal 

 

                          2008 12:00 AM         Pap Smear Declined 

 

                          10/7/2009 12:00 AM        Cholest Cry Stone Ql .0 %LDLc SerPl-mCnc 123.0 mg/dLHDLc

 SerPl-mCnc 34.0 mg/dLTrigl SerPl-mCnc 190.0 mg/dLGlucose SerPl-mCnc 78.0 mg/dL

 

                          2009 12:00 AM        Mammogram -Women over 40 Normal HIV1+2 Ab Ser Ql no risk 

 

                          2010 8:47 AM         Dexa Bone Scan Refused Aspirin reccommended Contraindication 



 

                          2010 8:48 AM         Depression Done 

 

                          2010 12:00 AM         Foot Exam-Diabetic Done 

 

                          2010 12:00 AM         Cholest Cry Stone Ql .0 %LDLc SerPl-mCnc 126.0 mg/dLGlucose

 SerPl-mCnc 102.0 mg/dL

 

                          2010 8:45 AM          TRIGLYCERIDES 122.0 mg/dLCHOLESTEROL 186.0 mg/dLHDL 36.0 mg/dLTOT

 CHOL/HDL 5.2 LDL (CALC) 126.0 mg/dLGLUCOSE 102.0 mg/dLSODIUM 143.0 
mmol/LPOTASSIUM 3.70 mmol/LCHLORIDE 111.0 mmol/LCO2 23.0 mmol/LBUN 10.0 
mg/dLCREATININE 0.80 mg/dLSGOT/AST 12.0 IU/LSGPT/ALT 11.0 IU/LALK PHOS 65.0 
IU/LTOTAL PROTEIN 7.20 g/dLALBUMIN 3.90 g/dLTOTAL BILI 0.50 mg/dLCALCIUM 10.20 
mg/dLAGE 59 GFR NonAA 73 GFR AA 88 eGFR >60 mL/min/1.73 m2eGFR AA* >60 WBC 5.7 
RBC 3.26 HGB 10.60 g/dLHCT 31.70 %MCV 97.0 fLMCH 32.50 pgMCHC 33.40 g/dLRDW SD 
47 RDW CV 13.30 %MPV 9.70 fLPLT 287 NRBC# 0.00 NRBC% 0.0 %NEUT 62.90 %%LYMP 
21.80 %%MONO 9.90 %%EOS 5.0 %%BASO 0.40 %#NEUT 3.56 #LYMP 1.23 #MONO 0.56 #EOS 
0.28 #BASO 0.02 MANUAL DIFF NOT IND 

 

                          2010 12:00 AM        Glucose SerPl-mCnc 96.0 mg/dLCholest Cry Stone Ql .0 %LDLc

 SerPl-mCnc 146.0 mg/dL

 

                          2010 8:26 AM         TRIGLYCERIDES 106.0 mg/dLCHOLESTEROL 199.0 mg/dLHDL 32.0 mg/dLTOT

 CHOL/HDL 6.2 LDL (CALC) 146.0 mg/dLGLUCOSE 96.0 mg/dLSODIUM 143.0 
mmol/LPOTASSIUM 4.0 mmol/LCHLORIDE 113.0 mmol/LCO2 24.0 mmol/LBUN 13.0 
mg/dLCREATININE 1.0 mg/dLSGOT/AST 11.0 IU/LSGPT/ALT 6.0 IU/LALK PHOS 56.0 
IU/LTOTAL PROTEIN 6.60 g/dLALBUMIN 3.80 g/dLTOTAL BILI 0.50 mg/dLCALCIUM 9.30 
mg/dLAGE 59 GFR NonAA 57 GFR AA 69 eGFR 57 eGFR AA* >60 

 

                          10/6/2010 12:00 AM        Cholest Cry Stone Ql .0 %LDLc SerPl-mCnc 111.0 mg/dLGlucose

 SerPl-mCnc 81.0 mg/dL

 

                          10/6/2010 2:45 PM         TRIGLYCERIDES 123.0 mg/dLCHOLESTEROL 178.0 mg/dLHDL 42.0 mg/dLTOT

 CHOL/HDL 4.2 LDL (CALC) 111.0 mg/dLGLUCOSE 81.0 mg/dLSODIUM 139.0 
mmol/LPOTASSIUM 4.10 mmol/LCHLORIDE 106.0 mmol/LCO2 24.0 mmol/LBUN 13.0 
mg/dLCREATININE 0.90 mg/dLSGOT/AST 13.0 IU/LSGPT/ALT 11.0 IU/LALK PHOS 61.0 
IU/LTOTAL PROTEIN 7.10 g/dLALBUMIN 3.90 g/dLTOTAL BILI 0.30 mg/dLCALCIUM 9.30 
mg/dLAGE 60 GFR NonAA 64 GFR AA 78 eGFR >60 mL/min/1.73 m2eGFR AA* >60 WBC 6.9 
RBC 3.59 HGB 11.50 g/dLHCT 35.30 %MCV 98.0 fLMCH 32.0 pgMCHC 32.60 g/dLRDW SD 46
 RDW CV 12.90 %MPV 9.90 fLPLT 311 NRBC# 0.00 NRBC% 0.0 %NEUT 64.90 %%LYMP 22.50 
%%MONO 7.20 %%EOS 5.10 %%BASO 0.30 %#NEUT 4.45 #LYMP 1.54 #MONO 0.49 #EOS 0.35 
#BASO 0.02 MANUAL DIFF NOT IND 

 

                          2011 12:00 AM         Mammogram -Women over 40 Ordered 

 

                          2011 10:25 AM        TRIGLYCERIDES 111.0 mg/dLCHOLESTEROL 195.0 mg/dLHDL 43.0 mg/dLTOT

 CHOL/HDL 4.5 LDL (CALC) 130.0 mg/dLWBC 5.3 RBC 3.76 HGB 12.0 g/dLHCT 37.80 %MCV
 101.0 fLMCH 31.90 pgMCHC 31.70 g/dLRDW SD 47 RDW CV 13.0 %MPV 9.70 fLPLT 259 
NRBC# 0.00 NRBC% 0.0 %NEUT 69.0 %%LYMP 17.60 %%MONO 8.30 %%EOS 4.70 %%BASO 0.40 
%#NEUT 3.63 #LYMP 0.93 #MONO 0.44 #EOS 0.25 #BASO 0.02 MANUAL DIFF NOT IND 
GLUCOSE 102.0 mg/dLSODIUM 146.0 mmol/LPOTASSIUM 4.20 mmol/LCHLORIDE 113.0 
mmol/LCO2 23.0 mmol/LBUN 15.0 mg/dLCREATININE 1.0 mg/dLSGOT/AST 12.0 
IU/LSGPT/ALT 17.0 IU/LALK PHOS 60.0 IU/LTOTAL PROTEIN 6.90 g/dLALBUMIN 4.20 
g/dLTOTAL BILI 0.40 mg/dLCALCIUM 9.70 mg/dLAGE 60 GFR NonAA 57 GFR AA 69 eGFR 57
 eGFR AA* >60 

 

                          2011 11:49 AM        Cholest Cry Stone Ql .0 %LDLc SerPl-mCnc 130.0 mg/dLHDLc

 SerPl-mCnc 43.0 mg/dLTrigl SerPl-mCnc 111.0 mg/dLGlucose SerPl-mCnc 102.0 mg/dL

 

                          2011 11:52 AM        Pap Smear Declined 

 

                          2011 11:28 AM        Lithium 2.080 mmol/LGLUCOSE 102.0 mg/dLSODIUM 135.0 mmol/LPOTASSIUM

 3.90 mmol/LCHLORIDE 106.0 mmol/LCO2 21.0 mmol/LBUN 12.0 mg/dLCREATININE 1.30 
mg/dLCALCIUM 10.70 mg/dLAGE 60 GFR NonAA 42 GFR AA 51 eGFR 42 eGFR AA* 51 

 

                          2011 8:58 AM          Lithium 0.690 mmol/L

 

                          2011 2:38 PM         VITAMIN B12 3483.0 pg/mL

 

                          2013 3:35 PM          WBC 5.1 RBC 3.73 HGB 11.70 g/dLHCT 36.40 %MCV 98.0 fLMCH 31.40

 pgMCHC 32.10 g/dLRDW SD 47 RDW CV 13.10 %MPV 9.80 fLPLT 224 NRBC# 0.00 NRBC% 
0.0 %NEUT 66.80 %%LYMP 19.10 %%MONO 9.0 %%EOS 4.90 %%BASO 0.20 %#NEUT 3.42 #LYMP
 0.98 #MONO 0.46 #EOS 0.25 #BASO 0.01 MANUAL DIFF NOT IND GLUCOSE 88.0 
mg/dLSODIUM 141.0 mmol/LPOTASSIUM 4.10 mmol/LCHLORIDE 110.0 mmol/LCO2 22.0 
mmol/LBUN 22.0 mg/dLCREATININE 1.10 mg/dLCALCIUM 9.80 mg/dLAGE 62 GFR NonAA 50 
GFR AA 61 eGFR 50 eGFR AA* 60 Lithium 0.760 mmol/L

 

                          2013 11:02 AM        TRIGLYCERIDES 106.0 mg/dLCHOLESTEROL 181.0 mg/dLHDL 46.0 mg/dLTOT

 CHOL/HDL 3.9 LDL (CALC) 114.0 mg/dLVITAMIN D 41.10 ng/mL

 

                          10/17/2014 10:10 AM       WBC 5.0 RBC 3.66 HGB 11.60 g/dLHCT 36.80 %.0 fLMCH 31.70

 pgMCHC 31.50 g/dLRDW SD 50 RDW CV 13.50 %MPV 10.10 fLPLT 209 NRBC# 0.00 NRBC% 
0.0 %NEUT 69.20 %%LYMP 21.0 %%MONO 6.40 %%EOS 3.20 %%BASO 0.20 %#NEUT 3.46 #LYMP
 1.05 #MONO 0.32 #EOS 0.16 #BASO 0.01 MANUAL DIFF NOT IND GLUCOSE 100.0 
mg/dLSODIUM 148.0 mmol/LPOTASSIUM 3.90 mmol/LCHLORIDE 114.0 mmol/LCO2 26.0 
mmol/LBUN 12.0 mg/dLCREATININE 1.20 mg/dLSGOT/AST 9.0 IU/LSGPT/ALT <6 IU/LALK 
PHOS 82.0 IU/LTOTAL PROTEIN 6.90 g/dLALBUMIN 4.0 g/dLTOTAL BILI 0.40 
mg/dLCALCIUM 10.50 mg/dLAGE 64 GFR NonAA 45 GFR AA 55 eGFR 45 eGFR AA* 55 
TRIGLYCERIDES 96.0 mg/dLCHOLESTEROL 155.0 mg/dLHDL 38.0 mg/dLTOT CHOL/HDL 4.1 
LDL (CALC) 98.0 mg/dLLithium 0.850 mmol/LCancer Antigen (CA) 125 8.30 U/mL

 

                          2015 10:25 AM        Lithium 0.790 mmol/LWBC 4.8 RBC 3.44 HGB 11.0 g/dLHCT 35.20 

%.0 fLMCH 32.0 pgMCHC 31.30 g/dLRDW SD 53 RDW CV 14.0 %MPV 9.30 fLPLT 210
 NRBC# 0.00 NRBC% 0.0 %NEUT 70.80 %%LYMP 17.20 %%MONO 8.10 %%EOS 3.50 %%BASO 
0.40 %#NEUT 3.41 #LYMP 0.83 #MONO 0.39 #EOS 0.17 #BASO 0.02 MANUAL DIFF NOT IND 
TRIGLYCERIDES 107.0 mg/dLCHOLESTEROL 174.0 mg/dLHDL 43.0 mg/dLTOT CHOL/HDL 4.0 
LDL (CALC) 110.0 mg/dLGLUCOSE 90.0 mg/dLSODIUM 145.0 mmol/LPOTASSIUM 3.80 
mmol/LCHLORIDE 115.0 mmol/LCO2 24.0 mmol/LBUN 17.0 mg/dLCREATININE 1.30 
mg/dLSGOT/AST 18.0 IU/LSGPT/ALT 17.0 IU/LALK PHOS 56.0 IU/LTOTAL PROTEIN 6.70 
g/dLALBUMIN 3.90 g/dLTOTAL BILI 0.40 mg/dLCALCIUM 9.80 mg/dLAGE 64 GFR NonAA 41 
GFR AA 50 eGFR 41 eGFR AA* 50 

 

                          2015 8:50 AM        WBC 5.8 RBC 3.29 HGB 10.70 g/dLHCT 34.0 %.0 fLMCH 32.50

 pgMCHC 31.50 g/dLRDW SD 52 RDW CV 13.60 %MPV 9.60 fLPLT 223 NRBC# 0.00 NRBC% 
0.0 %NEUT 69.60 %%LYMP 18.90 %%MONO 8.50 %%EOS 2.80 %%BASO 0.20 %#NEUT 4.03 
#LYMP 1.09 #MONO 0.49 #EOS 0.16 #BASO 0.01 MANUAL DIFF NOT IND Lithium 0.620 
mmol/LGLUCOSE 83.0 mg/dLSODIUM 139.0 mmol/LPOTASSIUM 3.90 mmol/LCHLORIDE 109.0 
mmol/LCO2 22.0 mmol/LBUN 19.0 mg/dLCREATININE 1.40 mg/dLSGOT/AST 19.0 
IU/LSGPT/ALT 21.0 IU/LALK PHOS 55.0 IU/LTOTAL PROTEIN 6.50 g/dLALBUMIN 3.90 
g/dLTOTAL BILI 0.50 mg/dLCALCIUM 9.60 mg/dLAGE 65 GFR NonAA 38 GFR AA 46 eGFR 38
 eGFR AA* 46 TRIGLYCERIDES 121.0 mg/dLCHOLESTEROL 192.0 mg/dLHDL 51.0 mg/dLTOT 
CHOL/HDL 3.8 .0 mg/dLFREE T4 0.79 TSH 1.210 uIU/mLHemoglobin A1c 5.40 
%Estim. Avg Glu (eAG) 108 

 

                          3/15/2016 8:08 AM         VITAMIN B12 696.0 pg/mL

 

                          3/23/2016 8:26 AM         WBC 7.0 RBC 3.61 HGB 11.80 g/dLHCT 37.70 %.0 fLMCH 32.70

 pgMCHC 31.30 g/dLRDW SD 49 RDW CV 12.50 %MPV 10.0 fLPLT 207 NRBC# 0.00 NRBC% 
0.0 %NEUT 73.60 %%LYMP 16.40 %%MONO 6.60 %%EOS 3.0 %%BASO 0.30 %#NEUT 5.15 #LYMP
 1.15 #MONO 0.46 #EOS 0.21 #BASO 0.02 MANUAL DIFF NOT IND Lithium 0.940 
mmol/LGLUCOSE 108.0 mg/dLSODIUM 143.0 mmol/LPOTASSIUM 4.30 mmol/LCHLORIDE 110.0 
mmol/LCO2 27.0 mmol/LBUN 16.0 mg/dLCREATININE 1.60 mg/dLSGOT/AST 13.0 
IU/LSGPT/ALT 7.0 IU/LALK PHOS 71.0 IU/LTOTAL PROTEIN 6.80 g/dLALBUMIN 4.0 
g/dLTOTAL BILI 0.20 mg/dLCALCIUM 10.40 mg/dLAGE 65 GFR NonAA 32 GFR AA 39 eGFR 
32 eGFR AA* 39 TRIGLYCERIDES 113.0 mg/dLCHOLESTEROL 169.0 mg/dLHDL 42.0 mg/dLTOT
 CHOL/HDL 4.0 LDL (CALC) 104.0 mg/dLFREE T4 0.86 TSH 2.20 uIU/mLHemoglobin A1c 
5.20 %Estim. Avg Glu (eAG) 103 

 

                          3/25/2016 9:17 AM         COLOR YELLOW APPEARANCE CLEAR SPEC GRAV 1.010 pH 7.0 PROTEIN 

NEGATIVE GLUCOSE NEGATIVE mg/dLKETONE NEGATIVE BILIRUBIN NEGATIVE BLOOD NEGATIVE
 NITRITE NEGATIVE [place] SCREEN SMALL MICRO IND? SEE BELOW WBC/HPF 0-5 RBC/HPF 
NEGATIVE CASTS/LPF NEGATIVE /LPFCRYSTALS NEGATIVE MUCOUS THRDS NEGATIVE BACTERIA
 NEGATIVE EPITH CELLS FEW SQUAMOUS /HPFTRICHOMONAS NEGATIVE YEAST NEGATIVE 

 

                          2016 6:00 AM        GLUCOSE 91.0 mg/dLSODIUM 143.0 mmol/LPOTASSIUM 3.60 mmol/LCHLORIDE

 112.0 mmol/LCO2 23.0 mmol/LBUN 22.0 mg/dLCREATININE 1.20 mg/dLSGOT/AST 15.0 
IU/LSGPT/ALT 12.0 IU/LALK PHOS 61.0 IU/LTOTAL PROTEIN 5.40 g/dLALBUMIN 3.10 
g/dLTOTAL BILI 0.40 mg/dLCALCIUM 8.40 mg/dLAGE 66 GFR NonAA 45 GFR AA 55 eGFR 45
 eGFR AA* 55 WBC 3.0 RBC 3.05 HGB 9.80 g/dLHCT 32.10 %.0 fLMCH 32.10 
pgMCHC 30.50 g/dLRDW SD 54 RDW CV 14.20 %MPV 10.10 fLPLT 170 NRBC# 0.00 NRBC% 
0.0 %NEUT 50.70 %%LYMP 32.60 %%MONO 10.50 %%EOS 5.90 %%BASO 0.30 %#NEUT 1.54 
#LYMP 0.99 #MONO 0.32 #EOS 0.18 #BASO 0.01 MANUAL DIFF NOT IND 

 

                          2016 2:09 PM        COLOR YELLOW APPEARANCE CLEAR SPEC GRAV 1.010 pH 5.0 PROTEIN

 30 GLUCOSE NEGATIVE mg/dLKETONE NEGATIVE BILIRUBIN NEGATIVE BLOOD LARGE NITRITE
 NEGATIVE [facility] SCREEN MODERATE MICRO INDICATED? SEE BELOW WBC/HPF  RBC/HPF
 20-50 CASTS/LPF NEGATIVE /LPFCRYSTALS NEGATIVE MUCOUS THRDS NEGATIVE BACTERIA 
NEGATIVE EPITH CELLS FEW SQUAMOUS /HPFTRICHOMONAS NEGATIVE YEAST NEGATIVE CULT 
SET UP? YES 

 

                          1/3/2017 4:08 PM          COLOR YELLOW APPEARANCE HAZY SPEC GRAV 1.015 pH 6.0 PROTEIN 30

 GLUCOSE NEGATIVE mg/dLKETONE NEGATIVE BILIRUBIN NEGATIVE BLOOD MODERATE NITRITE
 NEGATIVE [facility] SCREEN LARGE MICRO INDICATED? SEE BELOW WBC/-200 RBC/HPF 
5-10 CASTS/LPF NEGATIVE /LPFCRYSTALS NEGATIVE MUCOUS THRDS NEGATIVE BACTERIA 
NEGATIVE EPITH CELLS 1+ SQUAMOUS /HPFTRICHOMONAS NEGATIVE YEAST NEGATIVE CULT 
SET UP? YES 

 

                          2017 4:24 PM         CREATININE 1.50 mg/dLAGE 66 GFR NonAA 35 GFR AA 42 eGFR 35 eGFR

 AA* 42 VITAMIN B12 1324.0 pg/mL







History Of Immunizations







       Name    Date Admin    Mfg Name    Mfg Code    Trade Name    Lot#    Route    Inj    Vis Given    Vis

 Pub                                    CVX

 

        Influenza    2008    Not Entered    NE      Not Entered            Not Entered    Not Entered

                    1            999

 

        X       12/19/2008    [employer]    MSD     Pneumovax 23            Intramuscular    Not Entered

                    1            999

 

           Influenza    10/15/2010    Haload.    NOV        Fluvirin > 12 Years    

[id number]     Intramuscular    Left Deltoid    10/15/2010    2009    999

 

          X         3/23/2015    [facility]    [id number]    Intramuscular

                Right Gluteous Medius    3/23/2015       2013       109







History of Past Illness







                    Name                Date of Onset       Comments

 

                    Peritoneal Neoplasm, Malignant                         

 

                    Hyperlipidemia                           

 

                    Bipolar disorder, unspecified                         

 

                    Artificial opening status; colostomy                         

 

                    B12 deficiency                           

 

                    Anemia, Pernicious                         

 

                    Arthritis unspecified                         

 

                    cervical cancer                          

 

                    Artificial opening status; colostomy    2010  1:10PM     

 

                    Bipolar disorder, unspecified    2010  1:10PM     

 

                    Hyperlipidemia      2010  1:10PM     

 

                    Anemia, Pernicious    2010  1:10PM     

 

                    Postoperative Follow-Up    2010  1:55PM     

 

                    Postoperative Follow-Up    Mar  8 2010 10:57AM     

 

                    Artificial opening status; colostomy    Mar  8 2010  1:19PM     

 

                    Peritoneal Neoplasm, Malignant    Mar  8 2010  1:19PM     

 

                    Artificial opening status; colostomy    2010  1:40PM     

 

                    Hyperlipidemia      2010  1:40PM     

 

                    Anemia, Pernicious    2010  1:40PM     

 

                    Peritoneal Neoplasm, Malignant    2010  1:40PM     

 

                    Arthritis unspecified    2010  1:40PM     

 

                    Anemia of Chronic Illness    2010  1:40PM     

 

                    Tinea corporis      2010  3:17PM     

 

                    Bipolar disorder, unspecified    2010  1:33PM     

 

                    Hyperlipidemia      2010  1:33PM     

 

                    Anemia, Pernicious    2010  1:33PM     

 

                    Peritoneal Neoplasm, Malignant    2010  1:33PM     

 

                    B12 deficiency      2010  1:33PM     

 

                    Ethmoidal Sinusitis, Acute    Sep 21 2010  3:53PM     

 

                    Wheezing            Sep 21 2010  3:53PM     

 

                    Flu                 Oct 15 2010  1:40PM     

 

                    Bipolar disorder, unspecified    Oct 15 2010  1:42PM     

 

                    Hyperlipidemia      Oct 15 2010  1:42PM     

 

                    Anemia, Pernicious    Oct 15 2010  1:42PM     

 

                    Peritoneal Neoplasm, Malignant    Oct 15 2010  1:42PM     

 

                    Bipolar disorder, unspecified    2011 12:01PM     

 

                    Hyperlipidemia      2011 12:01PM     

 

                    Anemia, Pernicious    2011 12:01PM     

 

                    Peritoneal Neoplasm, Malignant    2011 12:01PM     

 

                    Bipolar disorder, unspecified    Apr 15 2011 10:55AM     

 

                    Major Depression    2011 10:11AM     

 

                    Bipolar Disorder    2011 10:11AM     

 

                    Cancer              May 10 2011  4:16PM     

 

                    Major Depression    May 10 2011  3:16PM     

 

                    Bipolar Disorder    May 10 2011  3:16PM     

 

                    Hypercalcemia       May 23 2011  2:47PM     

 

                    Bipolar disorder, unspecified    May 23 2011  2:47PM     

 

                    Colon Cancer, Personal History    May 23 2011  2:47PM     

 

                    Bipolar Disorder    May 31 2011  4:39PM     

 

                    Depressive Disorder    2011 10:01AM     

 

                    Vitamin B12 deficiency    2011 10:01AM     

 

                    Vitamin D Deficiency    2011  5:07PM     

 

                    Anemia, Vitamin B12 Deficiency    2011  5:07PM     

 

                    B12 deficiency      2011  3:56PM     

 

                    Routine gynecological examination    Aug  4 2011  9:08AM     

 

                    Screening Examination for Breast Cancer    Aug  4 2011  9:08AM     

 

                    Tinea Corporis      Aug  4 2011  9:08AM     

 

                    Depressive Disorder    Sep 23 2011  8:47AM     

 

                    Contact Dermatitis    Sep 23 2011  8:47AM     

 

                    Anemia, Pernicious    Sep 23 2011  8:47AM     

 

                    B12 deficiency      Sep 23 2011  8:47AM     

 

                    B12 deficiency      Sep 27 2011  2:58PM     

 

                    B12 deficiency      Oct 20 2011  2:34PM     

 

                    Flu                 Dec  9 2011  3:16PM     

 

                    B12 deficiency      Dec  9 2011  3:17PM     

 

                    B12 deficiency      2012  4:52PM     

 

                    B12 deficiency      Feb 2012 11:10AM     

 

                    B12 deficiency      2012  3:37PM     

 

                    B12 deficiency      May  3 2012  4:10PM     

 

                    B12 deficiency      2012  2:54PM     

 

                    B12 deficiency      2012 11:23AM     

 

                    B12 deficiency      Aug  9 2012  2:08PM     

 

                    B12 deficiency      Sep  6 2012  4:36PM     

 

                    B12 deficiency      Oct 16 2012 10:23AM     

 

                    Flu                 Feb  4   3:11PM     

 

                    Bipolar disorder, unspecified    Feb  4   2:48PM     

 

                    Anemia, Pernicious    Feb  4   2:48PM     

 

                    B12 deficiency      Feb  4   2:48PM     

 

                    Extrapyramidal abnormal movement disorder    Feb  4   2:48PM     

 

                    B12 deficiency      Apr  3 2013 12:03PM     

 

                    Bipolar disorder, unspecified    May  7 2013  1:31PM     

 

                    Anemia, Pernicious    May  7 2013  1:31PM     

 

                    B12 deficiency      May  7 2013  1:31PM     

 

                    Extrapyramidal abnormal movement disorder    May  7 2013  1:31PM     

 

                    B12 deficiency      2013  3:42PM     

 

                    B12 deficiency      2013  1:31PM     

 

                    Hyperlipidemia      Aug  7 2013 10:37AM     

 

                    Vitamin D Deficiency    Aug  7 2013 10:37AM     

 

                    Bipolar disorder, unspecified    Aug  7 2013 10:37AM     

 

                    Anemia, Pernicious    Aug  7 2013 10:37AM     

 

                    B12 deficiency      Aug  7 2013 10:37AM     

 

                    B12 deficiency      Sep 25 2013 11:15AM     

 

                    B12 deficiency      Dec 11 2013  3:16PM     

 

                    B12 deficiency      Mar  6 2014  1:48PM     

 

                    B12 deficiency      May 21 2014  3:17PM     

 

                    Screening Examination for Breast Cancer    2014  3:23PM     

 

                    Periumbilical abdominal pain    2014  3:23PM     

 

                    B12 deficiency      Jul 10 2014  2:52PM     

 

                    Anemia, Vitamin B12 Deficiency    Aug 13 2014  4:50PM     

 

                    Bipolar disorder    Oct 16 2014 11:13AM     

 

                    Hyperlipidemia      Oct 16 2014 11:13AM     

 

                    Anemia, Pernicious    Oct 16 2014 11:13AM     

 

                    Peritoneal Neoplasm, Malignant    Oct 16 2014 11:13AM     

 

                    Screening breast examination    Oct 16 2014 11:13AM     

 

                    Weight loss         Oct 16 2014 11:13AM     

 

                    Anemia, Pernicious    Mar 23 2015  2:57PM     

 

                    B12 deficiency      Mar 23 2015  2:57PM     

 

                    Need for Prevnar vaccine    Mar 23 2015  2:57PM     

 

                    Bipolar disorder    Mar 23 2015  2:57PM     

 

                    Hyperlipidemia      Mar 23 2015  2:57PM     

 

                    Anemia, Pernicious    Mar 23 2015  2:57PM     

 

                    Peritoneal Neoplasm, Malignant    Mar 23 2015  2:57PM     

 

                    B12 deficiency      May  4 2015  4:48PM     

 

                    Hyperlipidemia      May 13 2015  9:56AM     

 

                    Anemia              May 13 2015  9:56AM     

 

                    Bipolar disorder    May 13 2015  9:56AM     

 

                    Bipolar disorder    May 14 2015  3:27PM     

 

                    Hyperlipidemia      May 14 2015  3:27PM     

 

                    Anemia, Pernicious    May 14 2015  3:27PM     

 

                    Peritoneal Neoplasm, Malignant    May 14 2015  3:27PM     

 

                    B12 deficiency      2015  2:20PM     

 

                    B12 deficiency      2015 11:34AM     

 

                    B12 deficiency      Aug 18 2015  9:06AM     

 

                    Tinea Corporis      Sep 18 2015  8:54AM     

 

                    B12 deficiency      Sep 18 2015  8:54AM     

 

                    B12 deficiency      2015 10:28AM     

 

                    Herpes zoster without complication    Dec  3 2015  9:52AM     

 

                    B12 deficiency      Dec 23 2015 11:21AM     

 

                    B12 deficiency      2016  4:51PM     

 

                    Vitamin B 12 deficiency    Mar 14 2016  5:35PM     

 

                    B12 deficiency      Mar 15 2016 12:14PM     

 

                    B12 deficiency      May  5 2016 11:30AM     

 

                    Edema               May  5 2016 11:30AM     

 

                    Dermatitis          May  5 2016 11:30AM     

 

                    Edema               May 17 2016  8:38AM     

 

                    Shortness of breath    May 17 2016  8:38AM     

 

                    Bilateral edema of lower extremity    2016  2:06PM     

 

                    B12 deficiency      2016  2:06PM     

 

                    B12 deficiency      2016 11:50AM     

 

                    B12 deficiency      2016 11:20AM     

 

                    Diarrhea            Aug  2 2016  3:13PM     

 

                    B12 deficiency      Aug 24 2016 11:10AM     

 

                    Encounter for screening mammogram for breast cancer    Aug 24 2016 11:44AM     

 

                    B12 deficiency      Sep 28 2016  2:35PM     

 

                    B12 deficiency      Dec 15 2016  2:02PM     

 

                    Dysuria             Dec 29 2016 12:14PM     

 

                    Hematuria           Fidencio  3 2017  1:33PM     

 

                    Constipation by delayed colonic transit    2017  1:52PM     

 

                    Ileus               2017  1:52PM     

 

                    UTI (urinary tract infection)    Fidencio 15 2017  3:39PM     

 

                    Acute cystitis with hematuria    2017 11:07AM     

 

                    B12 deficiency      2017 11:07AM     

 

                    B12 deficiency      2017 11:40AM     

 

                    B12 deficiency      2017  4:07PM     

 

                    Slurred speech      2017  3:07PM     

 

                    Vitamin B12 deficiency    2017  3:07PM     

 

                    Dysphagia, unspecified type    2017  3:07PM     

 

                    Hyperlipidemia      2017  3:07PM     







Payers







           Insurance Name    Company Name    Plan Name    Plan Number    Policy Number    Policy Group

 Number                                 Start Date

 

                    Medicare RHC    Medicare RHC              [id number]              N/A

 

                          [employer]    [employer]                 [id number]

                                                    

 

                    Medicare Part A    Medicare - Lab/Xray              [id number]              2006

 

                    Medicare Part B    Medic[employer] [place]              [id number]              2006

 

                          [employer]    [employer]                 50 percent

                                                    2009

 

                    [employer]              [id number]              N/A

 

                    Medicare Part A    Medicare Part A              [id number]              N/A

 

                    Medicare Part A    Medicare Part A              [id number]              2006









History of Encounters







                    Visit Date          Visit Type          Provider

 

                    2017           Office visit         

 

                    2017           Office visit        Bhupinder Louise DO

 

                    2017           Nurse visit         Bhupinder Louise DO

 

                    2017           Office visit        Radha Ontiveros APRN

 

                    1/15/2017           Office visit        Aj Tapia NP

 

                    2017            Office visit        Devin Masterson MD

 

                    2016          [facility]            Devin Masterson MD

 

                    12/15/2016          Nurse visit         Bhupinder Louise DO

 

                    2016           Nurse visit         Bret Forte APRFIORELLA

 

                    2016           Nurse visit         Bhupinder Louise DO

 

                    2016            Office visit        Bhupinder Louise DO

 

                    2016           Nurse visit         Bhupinder Louise DO

 

                    2016           Office visit        Bret Forte APRN

 

                    2016            Office visit        Bhupinder Louise DO

 

                    3/15/2016           Nurse visit         Bhupinder Louise DO

 

                    2016            Nurse visit         Bhupinder Louise DO

 

                    2015          Nurse visit         Bhupinder Nealte DO

 

                    12/3/2015           Office visit        Bhupinder Aspen DO

 

                    2015          Nurse visit         Bhupinder Aspen DO

 

                    2015           Office visit        Bhupinder Aspen DO

 

                    2015           Nurse visit         Bhupinder Aspen DO

 

                    2015            Nurse visit         Bhupinder Aspen DO

 

                    2015            Nurse visit         Bhupinder Aspen DO

 

                    2015           Office visit        Bhupinder Aspen DO

 

                    2015            Nurse visit         Bhupinder Aspen DO

 

                    3/23/2015           Office visit        Bhupinder Aspen DO

 

                    10/16/2014          Office visit        Bhupinder Aspen DO

 

                    2014           Nurse visit         Radha Ontiveros APRN

 

                    7/10/2014           Nurse visit         Bhupinder Aspen DO

 

                    2014           Office visit        Bhupinder Aspen DO

 

                    2014           Nurse visit         Bhupinder Aspen DO

 

                    3/6/2014            Nurse visit         Bhupidner Aspen DO

 

                    2014            [facility]            EARNEST Lopez MD

 

                    2013          Nurse visit         Bhupinder Aspen DO

 

                    2013           Nurse visit         Bhupinder Aspen DO

 

                    2013            Office visit        Bhupinder Aspen DO

 

                    2013            Nurse visit         Bhupinder Aspen DO

 

                    2013            Nurse visit         Bhupinder Aspen DO

 

                    2013            Office visit        Bhupinder Aspen DO

 

                    4/3/2013            Nurse visit         Bhupinder Aspen DO

 

                    2013            Office visit        Bhupinder Aspen DO

 

                    10/16/2012          Nurse visit         Bhupinder Aspen DO

 

                    2012            Nurse visit         Bhupinder Aspen DO

 

                    2012            Voided              Bhupinder Aspen DO

 

                    2012            Nurse visit         Bhupinder Aspen DO

 

                    2012            Nurse visit         Bhupinder Aspen DO

 

                    2012           Nurse visit         Bhupinder Aspen DO

 

                    5/3/2012            Nurse visit         Bhupinder Aspen DO

 

                    2012           Nurse visit         Bhupinder Aspen DO

 

                    2012           Nurse visit         Bhupinder Aspen DO

 

                    2012           Nurse visit         Bhupinder Aspen DO

 

                    2011           Nurse visit         Bhupinder Aspen DO

 

                    10/20/2011          Nurse visit         Bhupinder Aspen DO

 

                    2011           Office visit        Bhupinder Aspen DO

 

                    2011           Nurse visit         Radha Ontiveros APRN

 

                    2011            Office visit        Bhupinder Aspen DO

 

                    2011           Nurse visit         Bhupinder Louise DO

 

                    2011            Office visit        Bhupinder Louise DO

 

                    2011           Office visit        Bhupinder Loiuse DO

 

                    5/10/2011           Office visit        Bhupinder Louise DO

 

                    2011           Office visit        Bhupinder Louise DO

 

                    4/15/2011           Office visit        Devin Angel DO

 

                    2011           Office visit        Devin Angel DO

 

                    10/15/2010          Office visit        Devin Angel DO

 

                    2010           Office visit        Devin Angel DO

 

                    2010            Office visit        Devin Angel DO

 

                    2010           Office visit        Devin Angel DO

 

                    2010            Office visit        Devin Angel DO

 

                    3/8/2010            Office visit        Devin Masterson MD

 

                    2010            Surgery             Devin Masterson MD

 

                    2010            Office visit        Devin Angel DO

 

                    2010           Surgery             Devin Masterson MD

 

                    2010           [facility]            Devin Masterson MD

 

                    2010           [facility]            Devin Masterson MD

 

                    10/22/2009          Office visit        Devin Angel DO

## 2019-06-26 NOTE — XMS REPORT
MU2 Ambulatory Summary

                             Created on: 2017



Pauline Gan

External Reference #: [id number]

: 1950

Sex: Female



Demographics







                          Address                   [address]

[place]  [id number]

 

                          Home Phone                [phone]

 

                          Preferred Language        English

 

                          Marital Status            Legally 

 

                          Yazidism Affiliation     Unknown

 

                          Race                      White

 

                          Ethnic Group              Not  or 





Author







                          Author                    Bhupinder Louise

 

                          [facility]

 

                          Address                   [address]

[place]  [id number]



 

                          Phone                     [phone]







Care Team Providers







                    Care Team Member Name    Role                Phone

 

                    Bhupinder Louise    PCP                 Unavailable

 

                    Bhupinder Louise    PreferredProvider    Unavailable







Allergies and Adverse Reactions







                    Name                Reaction            Notes

 

                    NO KNOWN DRUG ALLERGIES                         







Plan of Treatment







             Planned Activity    Comments     Planned Date    Planned Time    Plan/Goal

 

             Swallowing evaluation                 2017    12:00 AM      

 

             Urinalysis with C/S If Indicated.                 2017    12:00 AM      

 

             Injection,Subcutaneous/Intramuscul, C Medicare                 2017    12:00 AM      







Medications







                                        Active 

 

             Name         Start Date    Estimated Completion Date    SIG          Comments

 

                Latuda 20 mg oral tablet                                    take 1 tablet (20 mg) by oral route once daily with

 food (at least 350 calories)            

 

             pravastatin 40 mg oral tablet    3/30/2015                 TAKE 1 TABLET BY MOUTH DAILY     

 

                Namenda XR 28 mg oral capsule,sprinkle,ER 24hr    2015                       take 1 capsule (28

 mg) by oral route once daily            

 

                Namenda XR 28 mg oral capsule,sprinkle,ER 24hr    2016                       take 1 capsule (28

 mg) by oral route once daily            

 

                potassium chloride 10 mEq oral tablet extended release    2016                       take 1 tablet

 (10 meq) by oral route once daily       

 

             pravastatin 40 mg oral tablet    2016                 TAKE 1 TABLET BY MOUTH DAILY     

 

                Vitamin B-12 1,000 mcg/mL injection solution    2016                       inject 1 milliliter 

(1,000 mcg) by intramuscular route once a month     

 

                potassium chloride 10 mEq oral tablet extended release    2016                      take 1 tablet

 (10 meq) by oral route once daily       

 

                Namenda XR 28 mg oral capsule,sprinkle,ER 24hr    2016                      TAKE 1 CAPSULE BY

 MOUTH EVERY DAY                         

 

                furosemide 40 mg oral tablet    2016                      take 1 tablet (40 mg) by oral route

 once daily                              

 

                mirtazapine 45 mg oral tablet                                    take 1 tablet (45 mg) by oral route once daily

 before bedtime                          

 

             Fish Oil 300-1,000 mg oral capsule                              take 1 capsule by oral route daily     

 

             Vitamin D3 1,000 unit oral tablet                              take 1 tablet by oral route daily     

 

                Calcium 600 600 mg calcium (1,500 mg) oral tablet                                    take 1 tablet by oral route

 daily                                   

 

                Aspirin Low Dose 81 mg oral tablet,delayed release (DR/EC)                                    take 1 tablet 

(81 mg) by oral route once daily         









                                         

 

             Name         Start Date    Expiration Date    SIG          Comments

 

             Reglan 10mg    3/29/2010    2010    one ac and hs     

 

                Keflex 500 mg oral capsule    2010       10/1/2010       take 1 capsule (500 mg) by oral

 route every 6 hours for 10 days         

 

                Bactrim -160 mg oral tablet    2011       take 1 tablet by oral route

 every 12 hours for 7 days               

 

                triamcinolone acetonide 0.1 % topical cream    2011      apply a thin

 layer to the affected area(s) by topical route 2 times per day     

 

                sertraline 100 mg oral tablet    4/10/2012       5/10/2012       take 1.5 tablets by oral route

 daily for 30 days                       

 

                ergocalciferol (vitamin D2) 50,000 unit oral capsule    4/15/2013       2013       TAKE

 ONE CAPSULE BY MOUTH ONCE A WEEK        

 

                CYANOCOBALAM 1000MCGINJ 1000 milliliter    2013       INJECT 1ML INTRAMUSCULAR

 ONCE A MONTH                            

 

                pravastatin 40 mg oral tablet    3/25/2014       3/20/2015       TAKE ONE TABLET BY MOUTH EVERY

 DAY                                     

 

                          Zostavax (PF) 19,400 unit/0.65 mL subcutaneous suspension for reconstitution    3/23/2015

                    3/24/2015           inject 0.65 milliliter by subcutaneous route once     

 

                famciclovir 500 mg oral tablet    12/3/2015       12/10/2015      take 1 tablet (500 mg) by

 oral route every 8 hours for 7 days     

 

                furosemide 40 mg oral tablet    2016      take 1 tablet (40 mg) by oral

 route once daily                        

 

                Cipro 500 mg oral tablet    2016       take 1 tablet (500 mg) by oral route

 2 times per day for 5 days              

 

                Bactrim -160 mg oral tablet    2016        take 1 tablet by oral route

 every 12 hours for 7 days               

 

                metoclopramide HCl 10 mg oral tablet    2017        2017       take 1 tablet by oral

 route 2 times a day for 50 days         

 

                Macrobid 100 mg oral capsule    2017       take 1 capsule (100 mg) by oral

 route 2 times per day with food for 7 days     

 

                Augmentin 875-125 mg oral tablet    2017       take 1 tablet by oral route

 every 12 hours for 7 days               

 

                amoxicillin 500 mg oral tablet    2017       take 1 tablet (500 mg) by oral

 route every 12 hours for 7 days         









                                        Discontinued 

 

             Name         Start Date    Discontinued Date    SIG          Comments

 

                Tylenol 325 mg oral tablet                    2013        take 1 - 2 tablets (325 -650 mg) by oral

 route every 4-6 hours as needed         

 

                Calcium 600 + D(3) 600 mg(1,500mg) -400 unit oral tablet                    2011       take 1 tablet

 by oral route 2 times a day            no longer taking

 

                Vitamin B-12 1,000 mcg oral tablet extended release    2010       take 1

 tablet by oral route daily             no longer taking

 

                Antifungal (clotrimazole) 1 % topical cream    2010       apply to the 

affected and surrounding areas of skin by topical route 2 times per day morning 
and evening                              

 

                sertraline 100 mg oral tablet    5/10/2011       2011       take 2 tablets (200 mg) by 

oral route once daily                   discontinued by Dr. Serrano

 

                mirtazapine 15 mg oral tablet                    2011        take 1 tablet (15 mg) by oral route 

once daily before bedtime               Dr. Serrano

 

                mirtazapine 15 mg oral tablet                    2011        take 1 tablet (15 mg) by oral route 

once daily before bedtime               dc'd by Dr. Serrano

 

                Pristiq 50 mg oral tablet extended release 24 hr                    2013        take 1 tablet (50

 mg) by oral route once daily           Dr. Zach Sarahstiq 50 mg oral tablet extended release 24 hr                    2013        take 1 tablet (50

 mg) by oral route once daily           dose updated

 

                Vitamin B-12 1,000 mcg/mL injection solution    2011        inject 1 milliliter

 (1,000 mcg) by intramuscular route once a month    on list already

 

                    syringe with needle 1 mL 25 gauge x 1" miscellaneous syringe    2011

                          use for injection once a month     

 

                clotrimazole 1 % topical cream    2011        apply to the affected and surrounding

 areas of skin by topical route 2 times per day in the morning and evening     

 

                Vitamin D2 50,000 unit oral capsule    2011        take 1 capsule (50,000

 unit) by oral route once weekly        generic on list

 

                Pravachol 40 mg oral tablet    2012        take 1 tablet (40 mg) by oral 

route once daily for 90 days            generic on list

 

                lithium carbonate 300 mg oral capsule    2012        take 1 capsule by oral

 route daily                            dose updated

 

                Pristiq 100 mg oral tablet extended release 24 hr                    4/10/2012       take 1 and 1/2 

tablet (150 mg) by oral route once daily    Mental Health provider

 

                Pristiq 100 mg oral tablet extended release 24 hr                    4/10/2012       take 1 and 1/2 

tablet (150 mg) by oral route once daily    Discontinued by Dr Efrain Knight at [facility]

 

                hydroxyzine HCl 50 mg oral tablet    10/16/2014      2015       take 1 tablet (50 mg) 

by oral route at bedtime                 

 

                lithium carbonate 300 mg oral capsule    2015       take 1 capsule (300

 mg) by oral route 2 for 30 days         

 

                fluconazole 100 mg oral tablet    2015       12/3/2015       take 1 tablet (100 mg) by 

oral route once a week                   

 

                ketoconazole 2 % topical cream    2015       12/3/2015       apply to the affected area(s)

 by topical route 2 times per day        

 

                prednisone 10 mg oral tablet    12/3/2015       2016        take 2 tablets (20 mg) by oral

 route once daily for 4 days 1 tablet daily for 4 days 0.5 tablet daily for 4 
days                                     

 

                triamcinolone acetonide 0.1 % topical cream    2016       apply a thin layer

 to the affected area(s) by topical route 2 times per day     

 

                Cipro 500 mg oral tablet    1/15/2017       2017       take 1 tablet (500 mg) by oral route

 every 12 hours for 10 days              







Problem List







                    Description         Status              Onset

 

                    Artificial opening status; colostomy    Active               

 

                    Bipolar disorder, unspecified    Active               

 

                    Hyperlipidemia      Active               

 

                    Peritoneal Neoplasm, Malignant    Active               

 

                    Anemia, Pernicious    Active               

 

                    Arthritis unspecified    Active               

 

                    B12 deficiency      Active               







Vital Signs







      Date    Time    BP-Sys(mm[Hg]    BP-Lynn(mm[Hg])    HR(bpm)    RR(rpm)    Temp    WT    HT    HC    BMI

                    BSA                 BMI Percentile      O2 Sat(%)

 

        2017    3:05:00 PM    134 mmHg    70 mmHg    70 bpm    20 rpm    97.4 F    181 lbs    69 in

                          26.73 kg/m2    2.00 m2                   98 %

 

        2017    11:07:00 AM    124 mmHg    64 mmHg    62 bpm    17 rpm    98.2 F    181.2 lbs    69

 in                       26.7583 kg/m    2.0003 m                 98 %

 

        1/15/2017    3:34:00 PM    148 mmHg    89 mmHg    69 bpm    20 rpm    98.2 F    179 lbs    69 in

                          26.43 kg/m2    1.99 m2                   98 %

 

       2017    1:51:00 PM    160 mmHg    90 mmHg    100 bpm    20 rpm    96.5 F    179 lbs             

                                                                98 %

 

       2016    3:11:00 PM    134 mmHg    76 mmHg    80 bpm    20 rpm    98 F    163 lbs    69 in     

                24.07 kg/m2     1.90 m2                         98 %

 

        2016    2:04:00 PM    142 mmHg    86 mmHg    68 bpm    16 rpm    98.5 F    166 lbs    63 in

                          29.4053 kg/m    1.8295 m                 100 %

 

        2016    11:27:00 AM    148 mmHg    78 mmHg    90 bpm    20 rpm    98.2 F    153 lbs    69 in

                          22.59 kg/m2    1.84 m2                   96 %

 

        12/3/2015    9:50:00 AM    132 mmHg    70 mmHg    62 bpm    16 rpm    97.9 F    145 lbs    69 in

                          21.4125 kg/m    1.7894 m                 100 %

 

        2015    8:52:00 AM    132 mmHg    68 mmHg    52 bpm    20 rpm    97.8 F    141 lbs    69 in

                          20.82 kg/m2    1.76 m2                   100 %

 

        2015    3:25:00 PM    120 mmHg    62 mmHg    72 bpm    16 rpm    98.1 F    136 lbs    69 in

                          20.0835 kg/m    1.733 m                 98 %

 

       3/23/2015    2:55:00 PM    130 mmHg    76 mmHg    68 bpm    18 rpm    97 F    140 lbs    69 in    

                20.67 kg/m2     1.76 m2                         98 %

 

        10/16/2014    11:11:00 AM    120 mmHg    66 mmHg    77 bpm    20 rpm    98 F    130 lbs    69 in

                          19.1974 kg/m    1.6943 m                 100 %

 

        2014    3:21:00 PM    130 mmHg    66 mmHg    63 bpm    18 rpm    97.2 F    160 lbs    69 in

                          23.6276 kg/m    1.88 m2                   99 %

 

        2013    10:35:00 AM    132 mmHg    70 mmHg    66 bpm    20 rpm    98.1 F    157 lbs    69 in

                          23.18 kg/m2    1.862 m                  

 

        2013    1:29:00 PM    132 mmHg    70 mmHg    76 bpm    18 rpm    98.2 F    166 lbs    69 in 

                          24.5137 kg/m    1.91 m2                    

 

       2013    2:46:00 PM    128 mmHg    70 mmHg    76 bpm    16 rpm    98 F    160 lbs    69 in     

                23.63 kg/m2     1.8797 m                       

 

        2011    8:49:00 AM    128 mmHg    78 mmHg    70 bpm    18 rpm    97.9 F    164 lbs    69 in

                          24.2183 kg/m    1.90 m2                    

 

     2011    1:31:00 PM    132 mmHg    68 mmHg    84 bpm         97 F    167 lbs                        

                                         

 

        2011    9:09:00 AM    128 mmHg    70 mmHg    72 bpm    18 rpm    98.2 F    163 lbs    64 in 

                          27.9786 kg/m    1.83 m2                    

 

       2011    10:01:00 AM    132 mmHg    70 mmHg    72 bpm    18 rpm    98.2 F    154 lbs             

                                                                 

 

       2011    2:47:00 PM    128 mmHg    70 mmHg    72 bpm    18 rpm    97.8 F    156 lbs             

                                                                 

 

       5/10/2011    3:16:00 PM    144 mmHg    80 mmHg    72 bpm    18 rpm    98.2 F    158 lbs             

                                                                 

 

        2011    10:11:00 AM    132 mmHg    70 mmHg    70 bpm    18 rpm    98.2 F    168 lbs    69 in

                          24.809 kg/m    1.93 m2                    

 

        4/15/2011    10:52:00 AM    110 mmHg    60 mmHg    75 bpm    16 rpm    97.5 F    172.375 lbs    

69 in                     25.46 kg/m2    1.951 m                 100 %

 

        2011    11:43:00 AM    120 mmHg    82 mmHg    75 bpm    16 rpm    97.2 F    178.5 lbs    69

 in                       26.3596 kg/m    1.99 m2                   100 %

 

        10/15/2010    1:32:00 PM    120 mmHg    70 mmHg    80 bpm    18 rpm    96.6 F    177 lbs    69 in

                          26.14 kg/m2    1.977 m                 100 %

 

        2010    3:50:00 PM    168 mmHg    100 mmHg    82 bpm    18 rpm    97.8 F    177.5 lbs    69

 in                       26.2119 kg/m    1.98 m2                   97 %

 

        2010    1:21:00 PM    140 mmHg    80 mmHg    59 bpm    16 rpm    97.6 F    173.25 lbs    69 

in                        25.58 kg/m2    1.956 m                 100 %

 

        2010    3:02:00 PM    140 mmHg    80 mmHg    61 bpm    16 rpm    97.6 F    173.125 lbs    69

 in                       25.5658 kg/m    1.96 m2                   99 %

 

        2010    1:23:00 PM    130 mmHg    80 mmHg    66 bpm    16 rpm    96.8 F    173 lbs    69 in 

                          25.55 kg/m2    1.9546 m                 100 %

 

        2010    12:58:00 PM    130 mmHg    88 mmHg    75 bpm    16 rpm    98.4 F    172.25 lbs    69

 in                       25.4366 kg/m    1.95 m2                   100 %







Social History







                    Name                Description         Comments

 

                    denies alcohol use                         

 

                    denies smoking                           

 

                    Denies illicit substance abuse                         

 

                    retired                                 direct care

 

                    Single                                   

 

                    Exercises regularly                         

 

                    Attended some college                         

 

                    Tobacco             Never smoker         







History of Procedures







                    Date Ordered        Description         Order Status

 

                    2010 12:00 AM    COMPREHEN METABOLIC PANEL    Reviewed

 

                    2010 12:00 AM    COMPLETE CBC W/AUTO DIFF WBC    Reviewed

 

                    2010 12:00 AM    LIPID PANEL         Reviewed

 

                          2015 12:00 AM        B12 Injection, Up to 1000 Mcg NDC#7452-6373-17 [facility] 

                                        Reviewed

 

                    2011 12:00 AM    MAMMOGRAM SCREENING    Reviewed

 

                    2011 12:00 AM    CYTOPATH C/V THIN LAYER    Reviewed

 

                    2011 12:00 AM    B12 Injection 1 cc NDC#[address]-27    Reviewed

 

                    2015 12:00 AM    THER/PROPH/DIAG INJ SC/IM    Reviewed

 

                    2015 12:00 AM    B12 Injection, Up to 1000 Mcg NDC#8658-0966-54    Reviewed

 

                    2011 12:00 AM    THER/PROPH/DIAG INJ SC/IM    Reviewed

 

                    2011 12:00 AM    B12 Injection([place]) Ndc#0627-5536-63-    Reviewed

 

                    2015 12:00 AM    THER/PROPH/DIAG INJ SC/IM    Reviewed

 

                    2015 12:00 AM    B12 Injection, Up to 1000 Mcg NDC#3400-7585-43    Reviewed

 

                    10/20/2011 12:00 AM    THER/PROPH/DIAG INJ SC/IM    Reviewed

 

                    10/20/2011 12:00 AM    B12 Injection([place]) Ndc#0725-3306-24-    Reviewed

 

                    2016 12:00 AM    THER/PROPH/DIAG INJ SC/IM    Reviewed

 

                    2016 12:00 AM    B12 Injection, Up to 1000 Mcg NDC#9283-9669-62    Reviewed

 

                    3/14/2016 12:00 AM    VITAMIN B-12        Reviewed

 

                    3/15/2016 12:00 AM    THER/PROPH/DIAG INJ SC/IM    Reviewed

 

                    3/15/2016 12:00 AM    B12 Injection, Up to 1000 Mcg NDC#3769-7077-91    Reviewed

 

                    2011 12:00 AM    ***Immunization administration, Medicare flu    Reviewed

 

                    2011 12:00 AM    Fluzone ** MEDICARE Only **    Reviewed

 

                    2011 12:00 AM    THER/PROPH/DIAG INJ SC/IM    Reviewed

 

                    2011 12:00 AM    B12 Injection ([employer] [facility]) Ndc#5203-8027-69    Reviewed

 

                    2016 12:00 AM    B12 Injection, Up to 1000 Mcg NDC#4509-2545-86 [facility]    

Reviewed

 

                    2016 12:00 AM    TTE W/DOPPLER COMPLETE    Reviewed

 

                    2016 12:00 AM    EXTREMITY STUDY     Reviewed

 

                          2016 12:00 AM        B12 Injection, Up to 1000 Mcg NDC#9048-1070-59 [facility] Medicare 

                                        Reviewed

 

                    2016 12:00 AM    THER/PROPH/DIAG INJ SC/IM    Reviewed

 

                    2016 12:00 AM    B12 Injection, Up to 1000 Mcg NDC#4115-0425-88    Reviewed

 

                    2016 12:00 AM    THER/PROPH/DIAG INJ SC/IM    Reviewed

 

                    2012 12:00 AM    B12 Injection([place]) Ndc#8596-6459-16-    Reviewed

 

                    2016 12:00 AM    B12 Injection, Up to 1000 Mcg NDC#4854-2275-86    Reviewed

 

                    2016 12:00 AM    THER/PROPH/DIAG INJ SC/IM    Reviewed

 

                    2012 12:00 AM    THER/PROPH/DIAG INJ SC/IM    Reviewed

 

                    2012 12:00 AM    B12 Injection ([employer]) Ndc#8384-6675-84    Reviewed

 

                    2016 12:00 AM    THER/PROPH/DIAG INJ SC/IM    Reviewed

 

                    2016 12:00 AM    B12 Injection, Up to 1000 Mcg NDC#6838-8453-04    Reviewed

 

                    2016 12:00 AM    B12 Injection, Up to 1000 Mcg NDC#4797-9145-82    Reviewed

 

                    2016 12:00 AM    THER/PROPH/DIAG INJ SC/IM    Reviewed

 

                    2012 12:00 AM    THER/PROPH/DIAG INJ SC/IM    Reviewed

 

                    2012 12:00 AM    B12 Injection([place]) Ndc#6971-1731-74-    Reviewed

 

                    12/15/2016 12:00 AM    B12 Injection, Up to 1000 Mcg NDC#4822-5686-23    Reviewed

 

                    12/15/2016 12:00 AM    THER/PROPH/DIAG INJ SC/IM    Reviewed

 

                    2016 12:00 AM    URNLS DIP STICK/TABLET RGNT AUTO W/O MICROSCOPY    Returned

 

                    1/3/2017 12:00 AM    URNLS DIP STICK/TABLET RGNT AUTO W/O MICROSCOPY    Returned

 

                    2017 12:00 AM    URINE CULTURE/COLONY COUNT    Returned

 

                    2017 12:00 AM    Rocephin 1 gram Injection, [facility]    Reviewed

 

                    2017 12:00 AM    THERAPEUTIC PROPHYLACTIC/DX INJECTION SUBQ/IM    Reviewed

 

                    2017 12:00 AM    B12 1000mcg Injection, [facility]    Reviewed

 

                    5/3/2012 12:00 AM    THER/PROPH/DIAG INJ SC/IM    Reviewed

 

                    5/3/2012 12:00 AM    B12 Injection([place]) Ndc#7025-3960-13-    Reviewed

 

                    2017 12:00 AM    THERAPEUTIC PROPHYLACTIC/DX INJECTION SUBQ/IM    Reviewed

 

                    2017 12:00 AM    B12 1000mcg Injection, [facility]    Reviewed

 

                    2017 12:00 AM    MRI BRAIN STEM W/O & W/DYE    Reviewed

 

                    2017 12:00 AM    VITAMIN B-12        Reviewed

 

                    2017 12:00 AM    Speech Therapy Consult    Reviewed

 

                    2017 12:00 AM    ASSAY OF CREATININE    Reviewed

 

                    2012 12:00 AM    IMMUNOTHERAPY INJECTIONS    Reviewed

 

                    2012 12:00 AM    B12 Injection([place]) Ndc#1372-8967-33-    Reviewed

 

                    2012 12:00 AM    THER/PROPH/DIAG INJ SC/IM    Reviewed

 

                    2012 12:00 AM    B12 Injection, Up to 1000 Mcg NDC#3349-8015-73    Reviewed

 

                    2012 12:00 AM    THER/PROPH/DIAG INJ SC/IM    Reviewed

 

                    2012 12:00 AM    B12 Injection, Up to 1000 Mcg NDC#7420-7149-93    Reviewed

 

                    2012 12:00 AM    THER/PROPH/DIAG INJ SC/IM    Reviewed

 

                    2012 12:00 AM    B12 Injection, Up to [address] NDC#7096-8010-54    Reviewed

 

                    10/16/2012 12:00 AM    THER/PROPH/DIAG INJ SC/IM    Reviewed

 

                    10/16/2012 12:00 AM    B12 Injection, Up to [address] NDC#0998-7980-21    Reviewed

 

                    2010 12:00 AM    COMPREHEN METABOLIC PANEL    Reviewed

 

                    2010 12:00 AM    COMPLETE CBC W/AUTO DIFF WBC    Reviewed

 

                    2010 12:00 AM    LIPID PANEL         Reviewed

 

                    2013 12:00 AM    Flu Injection 3 Years And Above NDC# [address]-11  [facility]    Reviewed



 

                    2013 12:00 AM    COMPLETE CBC W/AUTO DIFF WBC    Reviewed

 

                    2013 12:00 AM    ASSAY OF LITHIUM    Reviewed

 

                    2013 12:00 AM    METABOLIC PANEL TOTAL CA    Reviewed

 

                    4/3/2013 12:00 AM    THER/PROPH/DIAG INJ SC/IM    Reviewed

 

                    4/3/2013 12:00 AM    B12 Injection, Up to [address] NDC#2179-6998-08    Reviewed

 

                    2013 12:00 AM    THER/PROPH/DIAG INJ SC/IM    Reviewed

 

                    2013 12:00 AM    B12 Injection, Up to [address] NDC#3004-0470-78    Reviewed

 

                    2013 12:00 AM    THER/PROPH/DIAG INJ SC/IM    Reviewed

 

                    2013 12:00 AM    B12 Injection, Up to [address] NDC#3248-3993-31    Reviewed

 

                    2013 12:00 AM    LIPID PANEL         Reviewed

 

                    2013 12:00 AM    VITAMIN D 25 HYDROXY    Reviewed

 

                    2013 12:00 AM    THER/PROPH/DIAG INJ SC/IM    Reviewed

 

                    2013 12:00 AM    B12 Injection, Up to [address] NDC#4585-2752-16    Reviewed

 

                    2013 12:00 AM    THER/PROPH/DIAG INJ SC/IM    Reviewed

 

                    3/6/2014 12:00 AM    THER/PROPH/DIAG INJ SC/IM    Reviewed

 

                    2014 12:00 AM    THER/PROPH/DIAG INJ SC/IM    Reviewed

 

                    2014 12:00 AM    B12 Injection, Up to [address] NDC#9952-5735-18    Reviewed

 

                    2010 12:00 AM    SKIN FUNGI CULTURE    Reviewed

 

                    10/9/2010 12:00 AM    COMPREHEN METABOLIC PANEL    Reviewed

 

                    10/9/2010 12:00 AM    LIPID PANEL         Reviewed

 

                    2010 12:00 AM    THER/PROPH/DIAG INJ SC/IM    Reviewed

 

                    2010 12:00 AM    B12 Injection Ndc#[address]-03 ([place])    Reviewed

 

                    2010 12:00 AM    THER/PROPH/DIAG INJ SC/IM    Reviewed

 

                    2010 12:00 AM    Kenalog 40 Mg Im-Ndc#[address]-86 ([place])    Reviewed

 

                    10/15/2010 12:00 AM    FLU VACCINE 3 YRS & > IM    Reviewed

 

                    10/15/2010 12:00 AM    Admin.Of M/C Cov.Vaccine-Flu Vacc.    Reviewed

 

                    1/15/2011 12:00 AM    COMPLETE CBC W/AUTO DIFF WBC    Reviewed

 

                    1/15/2011 12:00 AM    COMPREHEN METABOLIC PANEL    Reviewed

 

                    1/15/2011 12:00 AM    LIPID PANEL         Reviewed

 

                    2014 12:00 AM    MAMMOGRAM SCREENING    Reviewed

 

                    2014 12:00 AM    Screening mammography, bilateral    Reviewed

 

                    7/10/2014 12:00 AM    THER/PROPH/DIAG INJ SC/IM    Reviewed

 

                    7/10/2014 12:00 AM    B12 Injection, Up to [address] NDC#3044-9016-86    Reviewed

 

                    2011 12:00 AM    COMPLETE CBC W/AUTO DIFF WBC    Reviewed

 

                    2011 12:00 AM    COMPREHEN METABOLIC PANEL    Reviewed

 

                    2011 12:00 AM    LIPID PANEL         Reviewed

 

                    2014 12:00 AM    B12 Injection, Up to [address] NDC#2275-9507-30    Reviewed

 

                    10/19/2014 12:00 AM    MAMMOGRAM SCREENING    Reviewed

 

                    10/19/2014 12:00 AM    Screening mammography, bilateral    Reviewed

 

                    10/16/2014 12:00 AM    B12 Injection, Up to [address] NDC#2178-3551-14    Reviewed

 

                    10/16/2014 12:00 AM    COMPLETE CBC W/AUTO DIFF WBC    Reviewed

 

                    10/16/2014 12:00 AM    COMPREHEN METABOLIC PANEL    Reviewed

 

                    10/16/2014 12:00 AM    IMMUNOASSAY TUMOR     Reviewed

 

                    10/16/2014 12:00 AM    LIPID PANEL         Reviewed

 

                    10/16/2014 12:00 AM    ASSAY OF LITHIUM    Reviewed

 

                    10/16/2014 12:00 AM    MAMMOGRAM SCREENING    Reviewed

 

                    2011 12:00 AM    ASSAY OF PARATHORMONE    Reviewed

 

                    2011 12:00 AM    VITAMIN D 25 HYDROXY    Reviewed

 

                    2011 12:00 AM    ASSAY OF LITHIUM    Reviewed

 

                    2011 12:00 AM    METABOLIC PANEL TOTAL CA    Reviewed

 

                    2011 12:00 AM    CT HEAD/BRAIN W/O & W/DYE    Reviewed

 

                    3/23/2015 12:00 AM    PNEUMOCOCCAL VACC 13 GLENDY IM    Reviewed

 

                    3/23/2015 12:00 AM    Vitamin B12 injection    Reviewed

 

                    2011 12:00 AM    ASSAY OF LITHIUM    Reviewed

 

                    2011 12:00 AM    B12 Injection Ndc#[address]-37  [facility]    Reviewed

 

                    2015 12:00 AM    THER/PROPH/DIAG INJ SC/IM    Reviewed

 

                    2015 12:00 AM    B12 Injection, Up to [address] NDC#3238-7013-24    Reviewed

 

                    2015 12:00 AM    COMPLETE CBC W/AUTO DIFF WBC    Reviewed

 

                    2015 12:00 AM    COMPREHEN METABOLIC PANEL    Reviewed

 

                    2015 12:00 AM    LIPID PANEL         Reviewed

 

                    2015 12:00 AM    ASSAY OF LITHIUM    Reviewed

 

                    2011 12:00 AM    VIT D 1 25-DIHYDROXY    Reviewed

 

                    2011 12:00 AM    VITAMIN B-12        Reviewed

 

                    2015 12:00 AM    B12 Injection, Up to [address] NDC#3049-8424-27    Reviewed

 

                    2015 12:00 AM    THER/PROPH/DIAG INJ SC/IM    Reviewed

 

                    2015 12:00 AM    B12 Injection, Up to [address] NDC#0600-0004-74    Reviewed

 

                    2011 12:00 AM    THER/PROPH/DIAG INJ SC/IM    Reviewed

 

                    2011 12:00 AM    B12 Injection ([facility]) Ndc#8880-6660-34    Reviewed

 

                    2015 12:00 AM    THER/PROPH/DIAG INJ SC/IM    Reviewed

 

                    2015 12:00 AM    B12 Injection, Up to [address] NDC#0224-3805-46    Reviewed







Results Summary







                          Data and Description      Results

 

                          2004 12:00 AM        Colonoscopy-Women and Men over 50 Normal 

 

                          2008 12:00 AM         Pap Smear Declined 

 

                          10/7/2009 12:00 AM        Cholest Cry Stone Ql .0 %LDLc SerPl-mCnc 123.0 mg/dLHDLc

 SerPl-mCnc 34.0 mg/dLTrigl SerPl-mCnc 190.0 mg/dLGlucose SerPl-mCnc 78.0 mg/dL

 

                          2009 12:00 AM        Mammogram -Women over 40 Normal HIV1+2 Ab Ser Ql no risk 

 

                          2010 8:47 AM         Dexa Bone Scan Refused Aspirin reccommended Contraindication 



 

                          2010 8:48 AM         Depression Done 

 

                          2010 12:00 AM         Foot Exam-Diabetic Done 

 

                          2010 12:00 AM         Cholest Cry Stone Ql .0 %LDLc SerPl-mCnc 126.0 mg/dLGlucose

 SerPl-mCnc 102.0 mg/dL

 

                          2010 8:45 AM          TRIGLYCERIDES 122.0 mg/dLCHOLESTEROL 186.0 mg/dLHDL 36.0 mg/dLTOT

 CHOL/HDL 5.2 LDL (CALC) 126.0 mg/dLGLUCOSE 102.0 mg/dLSODIUM 143.0 
mmol/LPOTASSIUM 3.70 mmol/LCHLORIDE 111.0 mmol/LCO2 23.0 mmol/LBUN 10.0 
mg/dLCREATININE 0.80 mg/dLSGOT/AST 12.0 IU/LSGPT/ALT 11.0 IU/LALK PHOS 65.0 
IU/LTOTAL PROTEIN 7.20 g/dLALBUMIN 3.90 g/dLTOTAL BILI 0.50 mg/dLCALCIUM 10.20 
mg/dLAGE 59 GFR NonAA 73 GFR AA 88 eGFR >60 mL/min/1.73 m2eGFR AA* >60 WBC 5.7 
RBC 3.26 HGB 10.60 g/dLHCT 31.70 %MCV 97.0 fLMCH 32.50 pgMCHC 33.40 g/dLRDW SD 
47 RDW CV 13.30 %MPV 9.70 fLPLT 287 NRBC# 0.00 NRBC% 0.0 %NEUT 62.90 %%LYMP 
21.80 %%MONO 9.90 %%EOS 5.0 %%BASO 0.40 %#NEUT 3.56 #LYMP 1.23 #MONO 0.56 #EOS 
0.28 #BASO 0.02 MANUAL DIFF NOT IND 

 

                          2010 12:00 AM        Glucose SerPl-mCnc 96.0 mg/dLCholest Cry Stone Ql .0 %LDLc

 SerPl-mCnc 146.0 mg/dL

 

                          2010 8:26 AM         TRIGLYCERIDES 106.0 mg/dLCHOLESTEROL 199.0 mg/dLHDL 32.0 mg/dLTOT

 CHOL/HDL 6.2 LDL (CALC) 146.0 mg/dLGLUCOSE 96.0 mg/dLSODIUM 143.0 
mmol/LPOTASSIUM 4.0 mmol/LCHLORIDE 113.0 mmol/LCO2 24.0 mmol/LBUN 13.0 
mg/dLCREATININE 1.0 mg/dLSGOT/AST 11.0 IU/LSGPT/ALT 6.0 IU/LALK PHOS 56.0 
IU/LTOTAL PROTEIN 6.60 g/dLALBUMIN 3.80 g/dLTOTAL BILI 0.50 mg/dLCALCIUM 9.30 
mg/dLAGE 59 GFR NonAA 57 GFR AA 69 eGFR 57 eGFR AA* >60 

 

                          10/6/2010 12:00 AM        Cholest Cry Stone Ql .0 %LDLc SerPl-mCnc 111.0 mg/dLGlucose

 SerPl-mCnc 81.0 mg/dL

 

                          10/6/2010 2:45 PM         TRIGLYCERIDES 123.0 mg/dLCHOLESTEROL 178.0 mg/dLHDL 42.0 mg/dLTOT

 CHOL/HDL 4.2 LDL (CALC) 111.0 mg/dLGLUCOSE 81.0 mg/dLSODIUM 139.0 
mmol/LPOTASSIUM 4.10 mmol/LCHLORIDE 106.0 mmol/LCO2 24.0 mmol/LBUN 13.0 
mg/dLCREATININE 0.90 mg/dLSGOT/AST 13.0 IU/LSGPT/ALT 11.0 IU/LALK PHOS 61.0 
IU/LTOTAL PROTEIN 7.10 g/dLALBUMIN 3.90 g/dLTOTAL BILI 0.30 mg/dLCALCIUM 9.30 
mg/dLAGE 60 GFR NonAA 64 GFR AA 78 eGFR >60 mL/min/1.73 m2eGFR AA* >60 WBC 6.9 
RBC 3.59 HGB 11.50 g/dLHCT 35.30 %MCV 98.0 fLMCH 32.0 pgMCHC 32.60 g/dLRDW SD 46
 RDW CV 12.90 %MPV 9.90 fLPLT 311 NRBC# 0.00 NRBC% 0.0 %NEUT 64.90 %%LYMP 22.50 
%%MONO 7.20 %%EOS 5.10 %%BASO 0.30 %#NEUT 4.45 #LYMP 1.54 #MONO 0.49 #EOS 0.35 
#BASO 0.02 MANUAL DIFF NOT IND 

 

                          2011 12:00 AM         Mammogram -Women over 40 Ordered 

 

                          2011 10:25 AM        TRIGLYCERIDES 111.0 mg/dLCHOLESTEROL 195.0 mg/dLHDL 43.0 mg/dLTOT

 CHOL/HDL 4.5 LDL (CALC) 130.0 mg/dLWBC 5.3 RBC 3.76 HGB 12.0 g/dLHCT 37.80 %MCV
 101.0 fLMCH 31.90 pgMCHC 31.70 g/dLRDW SD 47 RDW CV 13.0 %MPV 9.70 fLPLT 259 
NRBC# 0.00 NRBC% 0.0 %NEUT 69.0 %%LYMP 17.60 %%MONO 8.30 %%EOS 4.70 %%BASO 0.40 
%#NEUT 3.63 #LYMP 0.93 #MONO 0.44 #EOS 0.25 #BASO 0.02 MANUAL DIFF NOT IND 
GLUCOSE 102.0 mg/dLSODIUM 146.0 mmol/LPOTASSIUM 4.20 mmol/LCHLORIDE 113.0 
mmol/LCO2 23.0 mmol/LBUN 15.0 mg/dLCREATININE 1.0 mg/dLSGOT/AST 12.0 
IU/LSGPT/ALT 17.0 IU/LALK PHOS 60.0 IU/LTOTAL PROTEIN 6.90 g/dLALBUMIN 4.20 
g/dLTOTAL BILI 0.40 mg/dLCALCIUM 9.70 mg/dLAGE 60 GFR NonAA 57 GFR AA 69 eGFR 57
 eGFR AA* >60 

 

                          2011 11:49 AM        Cholest Cry Stone Ql .0 %LDLc SerPl-mCnc 130.0 mg/dLHDLc

 SerPl-mCnc 43.0 mg/dLTrigl SerPl-mCnc 111.0 mg/dLGlucose SerPl-mCnc 102.0 mg/dL

 

                          2011 11:52 AM        Pap Smear Declined 

 

                          2011 11:28 AM        Lithium 2.080 mmol/LGLUCOSE 102.0 mg/dLSODIUM 135.0 mmol/LPOTASSIUM

 3.90 mmol/LCHLORIDE 106.0 mmol/LCO2 21.0 mmol/LBUN 12.0 mg/dLCREATININE 1.30 
mg/dLCALCIUM 10.70 mg/dLAGE 60 GFR NonAA 42 GFR AA 51 eGFR 42 eGFR AA* 51 

 

                          2011 8:58 AM          Lithium 0.690 mmol/L

 

                          2011 2:38 PM         VITAMIN B12 3483.0 pg/mL

 

                          2013 3:35 PM          WBC 5.1 RBC 3.73 HGB 11.70 g/dLHCT 36.40 %MCV 98.0 fLMCH 31.40

 pgMCHC 32.10 g/dLRDW SD 47 RDW CV 13.10 %MPV 9.80 fLPLT 224 NRBC# 0.00 NRBC% 
0.0 %NEUT 66.80 %%LYMP 19.10 %%MONO 9.0 %%EOS 4.90 %%BASO 0.20 %#NEUT 3.42 #LYMP
 0.98 #MONO 0.46 #EOS 0.25 #BASO 0.01 MANUAL DIFF NOT IND GLUCOSE 88.0 
mg/dLSODIUM 141.0 mmol/LPOTASSIUM 4.10 mmol/LCHLORIDE 110.0 mmol/LCO2 22.0 
mmol/LBUN 22.0 mg/dLCREATININE 1.10 mg/dLCALCIUM 9.80 mg/dLAGE 62 GFR NonAA 50 
GFR AA 61 eGFR 50 eGFR AA* 60 Lithium 0.760 mmol/L

 

                          2013 11:02 AM        TRIGLYCERIDES 106.0 mg/dLCHOLESTEROL 181.0 mg/dLHDL 46.0 mg/dLTOT

 CHOL/HDL 3.9 LDL (CALC) 114.0 mg/dLVITAMIN D 41.10 ng/mL

 

                          10/17/2014 10:10 AM       WBC 5.0 RBC 3.66 HGB 11.60 g/dLHCT 36.80 %.0 fLMCH 31.70

 pgMCHC 31.50 g/dLRDW SD 50 RDW CV 13.50 %MPV 10.10 fLPLT 209 NRBC# 0.00 NRBC% 
0.0 %NEUT 69.20 %%LYMP 21.0 %%MONO 6.40 %%EOS 3.20 %%BASO 0.20 %#NEUT 3.46 #LYMP
 1.05 #MONO 0.32 #EOS 0.16 #BASO 0.01 MANUAL DIFF NOT IND GLUCOSE 100.0 
mg/dLSODIUM 148.0 mmol/LPOTASSIUM 3.90 mmol/LCHLORIDE 114.0 mmol/LCO2 26.0 
mmol/LBUN 12.0 mg/dLCREATININE 1.20 mg/dLSGOT/AST 9.0 IU/LSGPT/ALT <6 IU/LALK 
PHOS 82.0 IU/LTOTAL PROTEIN 6.90 g/dLALBUMIN 4.0 g/dLTOTAL BILI 0.40 
mg/dLCALCIUM 10.50 mg/dLAGE 64 GFR NonAA 45 GFR AA 55 eGFR 45 eGFR AA* 55 
TRIGLYCERIDES 96.0 mg/dLCHOLESTEROL 155.0 mg/dLHDL 38.0 mg/dLTOT CHOL/HDL 4.1 
LDL (CALC) 98.0 mg/dLLithium 0.850 mmol/LCancer Antigen (CA) 125 8.30 U/mL

 

                          2015 10:25 AM        Lithium 0.790 mmol/LWBC 4.8 RBC 3.44 HGB 11.0 g/dLHCT 35.20 

%.0 fLMCH 32.0 pgMCHC 31.30 g/dLRDW SD 53 RDW CV 14.0 %MPV 9.30 fLPLT 210
 NRBC# 0.00 NRBC% 0.0 %NEUT 70.80 %%LYMP 17.20 %%MONO 8.10 %%EOS 3.50 %%BASO 
0.40 %#NEUT 3.41 #LYMP 0.83 #MONO 0.39 #EOS 0.17 #BASO 0.02 MANUAL DIFF NOT IND 
TRIGLYCERIDES 107.0 mg/dLCHOLESTEROL 174.0 mg/dLHDL 43.0 mg/dLTOT CHOL/HDL 4.0 
LDL (CALC) 110.0 mg/dLGLUCOSE 90.0 mg/dLSODIUM 145.0 mmol/LPOTASSIUM 3.80 
mmol/LCHLORIDE 115.0 mmol/LCO2 24.0 mmol/LBUN 17.0 mg/dLCREATININE 1.30 
mg/dLSGOT/AST 18.0 IU/LSGPT/ALT 17.0 IU/LALK PHOS 56.0 IU/LTOTAL PROTEIN 6.70 
g/dLALBUMIN 3.90 g/dLTOTAL BILI 0.40 mg/dLCALCIUM 9.80 mg/dLAGE 64 GFR NonAA 41 
GFR AA 50 eGFR 41 eGFR AA* 50 

 

                          2015 8:50 AM        WBC 5.8 RBC 3.29 HGB 10.70 g/dLHCT 34.0 %.0 fLMCH 32.50

 pgMCHC 31.50 g/dLRDW SD 52 RDW CV 13.60 %MPV 9.60 fLPLT 223 NRBC# 0.00 NRBC% 
0.0 %NEUT 69.60 %%LYMP 18.90 %%MONO 8.50 %%EOS 2.80 %%BASO 0.20 %#NEUT 4.03 
#LYMP 1.09 #MONO 0.49 #EOS 0.16 #BASO 0.01 MANUAL DIFF NOT IND Lithium 0.620 
mmol/LGLUCOSE 83.0 mg/dLSODIUM 139.0 mmol/LPOTASSIUM 3.90 mmol/LCHLORIDE 109.0 
mmol/LCO2 22.0 mmol/LBUN 19.0 mg/dLCREATININE 1.40 mg/dLSGOT/AST 19.0 
IU/LSGPT/ALT 21.0 IU/LALK PHOS 55.0 IU/LTOTAL PROTEIN 6.50 g/dLALBUMIN 3.90 
g/dLTOTAL BILI 0.50 mg/dLCALCIUM 9.60 mg/dLAGE 65 GFR NonAA 38 GFR AA 46 eGFR 38
 eGFR AA* 46 TRIGLYCERIDES 121.0 mg/dLCHOLESTEROL 192.0 mg/dLHDL 51.0 mg/dLTOT 
CHOL/HDL 3.8 .0 mg/dLFREE T4 0.79 TSH 1.210 uIU/mLHemoglobin A1c 5.40 
%Estim. Avg Glu (eAG) 108 

 

                          3/15/2016 8:08 AM         VITAMIN B12 696.0 pg/mL

 

                          3/23/2016 8:26 AM         WBC 7.0 RBC 3.61 HGB 11.80 g/dLHCT 37.70 %.0 fLMCH 32.70

 pgMCHC 31.30 g/dLRDW SD 49 RDW CV 12.50 %MPV 10.0 fLPLT 207 NRBC# 0.00 NRBC% 
0.0 %NEUT 73.60 %%LYMP 16.40 %%MONO 6.60 %%EOS 3.0 %%BASO 0.30 %#NEUT 5.15 #LYMP
 1.15 #MONO 0.46 #EOS 0.21 #BASO 0.02 MANUAL DIFF NOT IND Lithium 0.940 
mmol/LGLUCOSE 108.0 mg/dLSODIUM 143.0 mmol/LPOTASSIUM 4.30 mmol/LCHLORIDE 110.0 
mmol/LCO2 27.0 mmol/LBUN 16.0 mg/dLCREATININE 1.60 mg/dLSGOT/AST 13.0 
IU/LSGPT/ALT 7.0 IU/LALK PHOS 71.0 IU/LTOTAL PROTEIN 6.80 g/dLALBUMIN 4.0 
g/dLTOTAL BILI 0.20 mg/dLCALCIUM 10.40 mg/dLAGE 65 GFR NonAA 32 GFR AA 39 eGFR 
32 eGFR AA* 39 TRIGLYCERIDES 113.0 mg/dLCHOLESTEROL 169.0 mg/dLHDL 42.0 mg/dLTOT
 CHOL/HDL 4.0 LDL (CALC) 104.0 mg/dLFREE T4 0.86 TSH 2.20 uIU/mLHemoglobin A1c 
5.20 %Estim. Avg Glu (eAG) 103 

 

                          3/25/2016 9:17 AM         COLOR YELLOW APPEARANCE CLEAR SPEC GRAV 1.010 pH 7.0 PROTEIN 

NEGATIVE GLUCOSE NEGATIVE mg/dLKETONE NEGATIVE BILIRUBIN NEGATIVE BLOOD NEGATIVE
 NITRITE NEGATIVE [place] SCREEN SMALL MICRO IND? SEE BELOW WBC/HPF 0-5 RBC/HPF 
NEGATIVE CASTS/LPF NEGATIVE /LPFCRYSTALS NEGATIVE MUCOUS THRDS NEGATIVE BACTERIA
 NEGATIVE EPITH CELLS FEW SQUAMOUS /HPFTRICHOMONAS NEGATIVE YEAST NEGATIVE 

 

                          2016 6:00 AM        GLUCOSE 91.0 mg/dLSODIUM 143.0 mmol/LPOTASSIUM 3.60 mmol/LCHLORIDE

 112.0 mmol/LCO2 23.0 mmol/LBUN 22.0 mg/dLCREATININE 1.20 mg/dLSGOT/AST 15.0 
IU/LSGPT/ALT 12.0 IU/LALK PHOS 61.0 IU/LTOTAL PROTEIN 5.40 g/dLALBUMIN 3.10 
g/dLTOTAL BILI 0.40 mg/dLCALCIUM 8.40 mg/dLAGE 66 GFR NonAA 45 GFR AA 55 eGFR 45
 eGFR AA* 55 WBC 3.0 RBC 3.05 HGB 9.80 g/dLHCT 32.10 %.0 fLMCH 32.10 
pgMCHC 30.50 g/dLRDW SD 54 RDW CV 14.20 %MPV 10.10 fLPLT 170 NRBC# 0.00 NRBC% 
0.0 %NEUT 50.70 %%LYMP 32.60 %%MONO 10.50 %%EOS 5.90 %%BASO 0.30 %#NEUT 1.54 
#LYMP 0.99 #MONO 0.32 #EOS 0.18 #BASO 0.01 MANUAL DIFF NOT IND 

 

                          2016 2:09 PM        COLOR YELLOW APPEARANCE CLEAR SPEC GRAV 1.010 pH 5.0 PROTEIN

 30 GLUCOSE NEGATIVE mg/dLKETONE NEGATIVE BILIRUBIN NEGATIVE BLOOD LARGE NITRITE
 NEGATIVE [place] SCREEN MODERATE MICRO INDICATED? SEE BELOW WBC/HPF  RBC/HPF
 20-50 CASTS/LPF NEGATIVE /LPFCRYSTALS NEGATIVE MUCOUS THRDS NEGATIVE BACTERIA 
NEGATIVE EPITH CELLS FEW SQUAMOUS /HPFTRICHOMONAS NEGATIVE YEAST NEGATIVE CULT 
SET UP? YES 

 

                          1/3/2017 4:08 PM          COLOR YELLOW APPEARANCE HAZY SPEC GRAV 1.015 pH 6.0 PROTEIN 30

 GLUCOSE NEGATIVE mg/dLKETONE NEGATIVE BILIRUBIN NEGATIVE BLOOD MODERATE NITRITE
 NEGATIVE [place] SCREEN LARGE MICRO INDICATED? SEE BELOW WBC/-200 RBC/HPF 
5-10 CASTS/LPF NEGATIVE /LPFCRYSTALS NEGATIVE MUCOUS THRDS NEGATIVE BACTERIA 
NEGATIVE EPITH CELLS 1+ SQUAMOUS /HPFTRICHOMONAS NEGATIVE YEAST NEGATIVE CULT 
SET UP? YES 

 

                          2017 4:24 PM         CREATININE 1.50 mg/dLAGE 66 GFR NonAA 35 GFR AA 42 eGFR 35 eGFR

 AA* 42 VITAMIN B12 1324.0 pg/mL

 

                          2017 11:30 AM         GLUCOSE 93.0 mg/dLSODIUM 143.0 mmol/LPOTASSIUM 3.10 mmol/LCHLORIDE

 101.0 mmol/LCO2 29.0 mmol/LBUN 26.0 mg/dLCREATININE 1.50 mg/dLSGOT/AST 23.0 
IU/LSGPT/ALT 13.0 IU/LALK PHOS 66.0 IU/LTOTAL PROTEIN 7.70 g/dLALBUMIN 4.30 
g/dLTOTAL BILI 0.40 mg/dLCALCIUM 10.30 mg/dLAGE 66 GFR NonAA 35 GFR AA 42 eGFR 
35 eGFR AA* 42 TRIGLYCERIDES 147.0 mg/dLCHOLESTEROL 184.0 mg/dLHDL 44.0 mg/dLTOT
 CHOL/HDL 4.2 .0 mg/dLWBC 5.4 RBC 3.98 HGB 12.90 g/dLHCT 40.20 %.0
 fLMCH 32.40 pgMCHC 32.10 g/dLRDW SD 50 RDW CV 13.50 %MPV 9.30 fLPLT 210 NRBC# 
0.00 NRBC% 0.0 %NEUT 54.20 %%LYMP 30.70 %%MONO 9.10 %%EOS 5.20 %%BASO 0.40 
%#NEUT 2.94 #LYMP 1.66 #MONO 0.49 #EOS 0.28 #BASO 0.02 MANUAL DIFF NOT IND FREE 
T4 1.09 COLOR YELLOW APPEARANCE CLEAR SPEC GRAV <=1.005 pH 5.5 PROTEIN NEGATIVE 
GLUCOSE NEGATIVE mg/dLKETONE NEGATIVE BILIRUBIN NEGATIVE BLOOD NEGATIVE NITRITE 
NEGATIVE [place]UK SCREEN LARGE MICRO INDICATED? SEE BELOW WBC/HPF 10-20 RBC/HPF 0-5 
CASTS/LPF NEGATIVE /LPFCRYSTALS NEGATIVE MUCOUS THRDS NEGATIVE BACTERIA FEW 
EPITH CELLS 1+ SQUAMOUS /HPFTRICHOMONAS NEGATIVE YEAST NEGATIVE CULT SET UP? YES
 TSH 1.820 uIU/mL







History Of Immunizations







       Name    Date Admin    Mfg Name    Mfg Code    Trade Name    Lot#    Route    Inj    Vis Given    Vis

 Pub                                    CVX

 

        Influenza    2008    Not Entered    NE      Not Entered            Not Entered    Not Entered

                    1            999

 

        X       12/19/2008    [employer]    MSD     Pneumovax 23            Intramuscular    Not Entered

                    1            999

 

           Influenza    10/15/2010    [employer]    NOV        Fluvirin > 12 Years    

[id number]     Intramuscular    Left Deltoid    10/15/2010    2009    999

 

          X         3/23/2015    [facility]    [facility]    [id number]    Intramuscular

                Right Gluteous Medius    3/23/2015       2013       109







History of Past Illness







                    Name                Date of Onset       Comments

 

                    Peritoneal Neoplasm, Malignant                         

 

                    Hyperlipidemia                           

 

                    Bipolar disorder, unspecified                         

 

                    Artificial opening status; colostomy                         

 

                    B12 deficiency                           

 

                    Anemia, Pernicious                         

 

                    Arthritis unspecified                         

 

                    cervical cancer                          

 

                    Artificial opening status; colostomy    2010  1:10PM     

 

                    Bipolar disorder, unspecified    2010  1:10PM     

 

                    Hyperlipidemia      2010  1:10PM     

 

                    Anemia, Pernicious    2010  1:10PM     

 

                    Postoperative Follow-Up    2010  1:55PM     

 

                    Postoperative Follow-Up    Mar  8 2010 10:57AM     

 

                    Artificial opening status; colostomy    Mar  8 2010  1:19PM     

 

                    Peritoneal Neoplasm, Malignant    Mar  8 2010  1:19PM     

 

                    Artificial opening status; colostomy    2010  1:40PM     

 

                    Hyperlipidemia      2010  1:40PM     

 

                    Anemia, Pernicious    2010  1:40PM     

 

                    Peritoneal Neoplasm, Malignant    2010  1:40PM     

 

                    Arthritis unspecified    2010  1:40PM     

 

                    Anemia of Chronic Illness    2010  1:40PM     

 

                    Tinea corporis      2010  3:17PM     

 

                    Bipolar disorder, unspecified    2010  1:33PM     

 

                    Hyperlipidemia      2010  1:33PM     

 

                    Anemia, Pernicious    2010  1:33PM     

 

                    Peritoneal Neoplasm, Malignant    2010  1:33PM     

 

                    B12 deficiency      2010  1:33PM     

 

                    Ethmoidal Sinusitis, Acute    Sep 21 2010  3:53PM     

 

                    Wheezing            Sep 21 2010  3:53PM     

 

                    Flu                 Oct 15 2010  1:40PM     

 

                    Bipolar disorder, unspecified    Oct 15 2010  1:42PM     

 

                    Hyperlipidemia      Oct 15 2010  1:42PM     

 

                    Anemia, Pernicious    Oct 15 2010  1:42PM     

 

                    Peritoneal Neoplasm, Malignant    Oct 15 2010  1:42PM     

 

                    Bipolar disorder, unspecified    2011 12:01PM     

 

                    Hyperlipidemia      2011 12:01PM     

 

                    Anemia, Pernicious    2011 12:01PM     

 

                    Peritoneal Neoplasm, Malignant    2011 12:01PM     

 

                    Bipolar disorder, unspecified    Apr 15 2011 10:55AM     

 

                    Major Depression    2011 10:11AM     

 

                    Bipolar Disorder    2011 10:11AM     

 

                    Cancer              May 10 2011  4:16PM     

 

                    Major Depression    May 10 2011  3:16PM     

 

                    Bipolar Disorder    May 10 2011  3:16PM     

 

                    Hypercalcemia       May 23 2011  2:47PM     

 

                    Bipolar disorder, unspecified    May 23 2011  2:47PM     

 

                    Colon Cancer, Personal History    May 23 2011  2:47PM     

 

                    Bipolar Disorder    May 31 2011  4:39PM     

 

                    Depressive Disorder    2011 10:01AM     

 

                    Vitamin B12 deficiency    2011 10:01AM     

 

                    Vitamin D Deficiency    2011  5:07PM     

 

                    Anemia, Vitamin B12 Deficiency    2011  5:07PM     

 

                    B12 deficiency      2011  3:56PM     

 

                    Routine gynecological examination    Aug  4 2011  9:08AM     

 

                    Screening Examination for Breast Cancer    Aug  4 2011  9:08AM     

 

                    Tinea Corporis      Aug  4 2011  9:08AM     

 

                    Depressive Disorder    Sep 23 2011  8:47AM     

 

                    Contact Dermatitis    Sep 23 2011  8:47AM     

 

                    Anemia, Pernicious    Sep 23 2011  8:47AM     

 

                    B12 deficiency      Sep 23 2011  8:47AM     

 

                    B12 deficiency      Sep 27 2011  2:58PM     

 

                    B12 deficiency      Oct 20 2011  2:34PM     

 

                    Flu                 Dec  9 2011  3:16PM     

 

                    B12 deficiency      Dec  9 2011  3:17PM     

 

                    B12 deficiency      2012  4:52PM     

 

                    B12 deficiency      2012 11:10AM     

 

                    B12 deficiency      2012  3:37PM     

 

                    B12 deficiency      May  3 2012  4:10PM     

 

                    B12 deficiency      2012  2:54PM     

 

                    B12 deficiency      2012 11:23AM     

 

                    B12 deficiency      Aug  9 2012  2:08PM     

 

                    B12 deficiency      Sep  6 2012  4:36PM     

 

                    B12 deficiency      Oct 16 2012 10:23AM     

 

                    Flu                 Feb  2013  3:11PM     

 

                    Bipolar disorder, unspecified    Feb  2013  2:48PM     

 

                    Anemia, Pernicious    Feb  2013  2:48PM     

 

                    B12 deficiency      Feb  2013  2:48PM     

 

                    Extrapyramidal abnormal movement disorder    Feb  2013  2:48PM     

 

                    B12 deficiency      Apr  3 2013 12:03PM     

 

                    Bipolar disorder, unspecified    May  7 2013  1:31PM     

 

                    Anemia, Pernicious    May  7 2013  1:31PM     

 

                    B12 deficiency      May  7 2013  1:31PM     

 

                    Extrapyramidal abnormal movement disorder    May  7 2013  1:31PM     

 

                    B12 deficiency      2013  3:42PM     

 

                    B12 deficiency      2013  1:31PM     

 

                    Hyperlipidemia      Aug  7 2013 10:37AM     

 

                    Vitamin D Deficiency    Aug  7 2013 10:37AM     

 

                    Bipolar disorder, unspecified    Aug  7 2013 10:37AM     

 

                    Anemia, Pernicious    Aug  7 2013 10:37AM     

 

                    B12 deficiency      Aug  7 2013 10:37AM     

 

                    B12 deficiency      Sep 25 2013 11:15AM     

 

                    B12 deficiency      Dec 11 2013  3:16PM     

 

                    B12 deficiency      Mar  6 2014  1:48PM     

 

                    B12 deficiency      May 21 2014  3:17PM     

 

                    Screening Examination for Breast Cancer    2014  3:23PM     

 

                    Periumbilical abdominal pain    2014  3:23PM     

 

                    B12 deficiency      Jul 10 2014  2:52PM     

 

                    Anemia, Vitamin B12 Deficiency    Aug 13 2014  4:50PM     

 

                    Bipolar disorder    Oct 16 2014 11:13AM     

 

                    Hyperlipidemia      Oct 16 2014 11:13AM     

 

                    Anemia, Pernicious    Oct 16 2014 11:13AM     

 

                    Peritoneal Neoplasm, Malignant    Oct 16 2014 11:13AM     

 

                    Screening breast examination    Oct 16 2014 11:13AM     

 

                    Weight loss         Oct 16 2014 11:13AM     

 

                    Anemia, Pernicious    Mar 23 2015  2:57PM     

 

                    B12 deficiency      Mar 23 2015  2:57PM     

 

                    Need for Prevnar vaccine    Mar 23 2015  2:57PM     

 

                    Bipolar disorder    Mar 23 2015  2:57PM     

 

                    Hyperlipidemia      Mar 23 2015  2:57PM     

 

                    Anemia, Pernicious    Mar 23 2015  2:57PM     

 

                    Peritoneal Neoplasm, Malignant    Mar 23 2015  2:57PM     

 

                    B12 deficiency      May  4 2015  4:48PM     

 

                    Hyperlipidemia      May 13 2015  9:56AM     

 

                    Anemia              May 13 2015  9:56AM     

 

                    Bipolar disorder    May 13 2015  9:56AM     

 

                    Bipolar disorder    May 14 2015  3:27PM     

 

                    Hyperlipidemia      May 14 2015  3:27PM     

 

                    Anemia, Pernicious    May 14 2015  3:27PM     

 

                    Peritoneal Neoplasm, Malignant    May 14 2015  3:27PM     

 

                    B12 deficiency      2015  2:20PM     

 

                    B12 deficiency      2015 11:34AM     

 

                    B12 deficiency      Aug 18 2015  9:06AM     

 

                    Tinea Corporis      Sep 18 2015  8:54AM     

 

                    B12 deficiency      Sep 18 2015  8:54AM     

 

                    B12 deficiency      2015 10:28AM     

 

                    Herpes zoster without complication    Dec  3 2015  9:52AM     

 

                    B12 deficiency      Dec 23 2015 11:21AM     

 

                    B12 deficiency      2016  4:51PM     

 

                    Vitamin B 12 deficiency    Mar 14 2016  5:35PM     

 

                    B12 deficiency      Mar 15 2016 12:14PM     

 

                    B12 deficiency      May  5 2016 11:30AM     

 

                    Edema               May  5 2016 11:30AM     

 

                    Dermatitis          May  5 2016 11:30AM     

 

                    Edema               May 17 2016  8:38AM     

 

                    Shortness of breath    May 17 2016  8:38AM     

 

                    Bilateral edema of lower extremity    2016  2:06PM     

 

                    B12 deficiency      2016  2:06PM     

 

                    B12 deficiency      2016 11:50AM     

 

                    B12 deficiency      2016 11:20AM     

 

                    Diarrhea            Aug  2 2016  3:13PM     

 

                    B12 deficiency      Aug 24 2016 11:10AM     

 

                    Encounter for screening mammogram for breast cancer    Aug 24 2016 11:44AM     

 

                    B12 deficiency      Sep 28 2016  2:35PM     

 

                    B12 deficiency      Dec 15 2016  2:02PM     

 

                    Dysuria             Dec 29 2016 12:14PM     

 

                    Hematuria           Fidencio  3 2017  1:33PM     

 

                    Constipation by delayed colonic transit    2017  1:52PM     

 

                    Ileus               2017  1:52PM     

 

                    UTI (urinary tract infection)    Fidencio 15 2017  3:39PM     

 

                    Acute cystitis with hematuria    2017 11:07AM     

 

                    B12 deficiency      2017 11:07AM     

 

                    B12 deficiency      2017 11:40AM     

 

                    B12 deficiency      2017  4:07PM     

 

                    Slurred speech      2017  3:07PM     

 

                    Vitamin B12 deficiency    2017  3:07PM     

 

                    Dysphagia, unspecified type    2017  3:07PM     

 

                    Hyperlipidemia      2017  3:07PM     

 

                    Dysuria             2017 12:01PM     

 

                    B12 deficiency      2017  2:08PM     







Payers







           Insurance Name    Company Name    Plan Name    Plan Number    Policy Number    Policy Group

 Number                                 Start Date

 

                    Medicare RHC Medicare RHC              [id number]              N/A

 

                          [employer]                 [id number]

                                                    

 

                    Medicare Part A    Medicare - Lab/Xray              [id number]              2006

 

                    Medicare Part B    Medicare [place]              [id number]              2006

 

                          [employer]                 50 percent

                                                    2009

 

                    [facility]              [id number]              N/A

 

                    Medicare Part A    Medicare Part A              [id number]              N/A

 

                    Medicare Part A    Medicare Part A              [id number]              2006









History of Encounters







                    Visit Date          Visit Type          Provider

 

                    2017           Nurse visit         Bhupinder Louise DO

 

                    2017           Office visit         

 

                    2017           Office visit        Bhupinder Louise DO

 

                    2017           Nurse visit         Bhupinder Louise DO

 

                    2017           Office visit        Radha Ontiveros APRN

 

                    1/15/2017           Office visit        Aj Tapia NP

 

                    2017            Office visit        Devin Masterson MD

 

                    2016          [facility]            Devin Masterson MD

 

                    12/15/2016          Nurse visit         Bhupinder Louise DO

 

                    2016           Nurse visit         Bret Forte APRN

 

                    2016           Nurse visit         Bhupinder Aspen DO

 

                    2016            Office visit        Bhupinder Aspen DO

 

                    2016           Nurse visit         Bhupinder Aspen DO

 

                    2016           Office visit        Bret Forte APRN

 

                    2016            Office visit        Bhupinder Aspen DO

 

                    3/15/2016           Nurse visit         Bhupinder Aspen DO

 

                    2016            Nurse visit         Bhupinder Aspen DO

 

                    2015          Nurse visit         Bhupinder Aspen DO

 

                    12/3/2015           Office visit        Bhupinder Aspen DO

 

                    2015          Nurse visit         Bhupinder Aspen DO

 

                    2015           Office visit        Bhupinder Aspen DO

 

                    2015           Nurse visit         Bhupinder Aspen DO

 

                    2015            Nurse visit         Bhupinder Aspen DO

 

                    2015            Nurse visit         Bhupinder Aspen DO

 

                    2015           Office visit        Bhupinder Aspen DO

 

                    2015            Nurse visit         Bhupinder Aspen DO

 

                    3/23/2015           Office visit        Bhupinder Aspen DO

 

                    10/16/2014          Office visit        Bhupinder Aspen DO

 

                    2014           Nurse visit         Radha Weston APRN

 

                    7/10/2014           Nurse visit         Bhupinder Aspen DO

 

                    2014           Office visit        Bhupinder Aspen DO

 

                    2014           Nurse visit         Bhupinder Aspen DO

 

                    3/6/2014            Nurse visit         Bhupinder Aspen DO

 

                    2014            [facility]            EARNEST Lopez MD

 

                    2013          Nurse visit         Bhupinder Aspen DO

 

                    2013           Nurse visit         Bhupinder Aspen DO

 

                    2013            Office visit        Bhupinder Aspen DO

 

                    2013            Nurse visit         Bhupinder Aspen DO

 

                    2013            Nurse visit         Bhupinder Aspen DO

 

                    2013            Office visit        Bhupinder Aspen DO

 

                    4/3/2013            Nurse visit         Bhupinder Aspen DO

 

                    2013            Office visit        Bhupinder Aspen DO

 

                    10/16/2012          Nurse visit         Bhupinder Aspen DO

 

                    2012            Nurse visit         Bhupinder Aspen DO

 

                    2012            Voided              Bhupinder Aspen DO

 

                    2012            Nurse visit         Bhupinder Aspen DO

 

                    2012            Nurse visit         Bhupinder Aspen DO

 

                    2012           Nurse visit         Bhupinder Aspen DO

 

                    5/3/2012            Nurse visit         Bhupinder Aspen DO

 

                    2012           Nurse visit         Bhupinder Aspen DO

 

                    2012           Nurse visit         Bhupinder Aspen DO

 

                    2012           Nurse visit         Bhupinder Aspen DO

 

                    2011           Nurse visit         Bhupinder Aspen DO

 

                    10/20/2011          Nurse visit         Bhupinder Aspen DO

 

                    2011           Office visit        Bhupinder Aspen DO

 

                    2011           Nurse visit         Radha GREEN

 

                    2011            Office visit        Bhupinder Aspen DO

 

                    2011           Nurse visit         Bhupinder Aspen DO

 

                    2011            Office visit        Bhupinder Aspen DO

 

                    2011           Office visit        Bhupinder Aspen DO

 

                    5/10/2011           Office visit        Bhupinder Aspen DO

 

                    2011           Office visit        Bhupinder Aspen DO

 

                    4/15/2011           Office visit        Devin Angel DO

 

                    2011           Office visit        Devin Angel DO

 

                    10/15/2010          Office visit        Devin Angel DO

 

                    2010           Office visit        Devin Angel DO

 

                    2010            Office visit        Devin Angel DO

 

                    2010           Office visit        Devin Angel DO

 

                    2010            Office visit        Devin Angel DO

 

                    3/8/2010            Office visit        Devin Masterson MD

 

                    2010            Surgery             Devin Masterson MD

 

                    2010            Office visit        Devin Angel DO

 

                    2010           Surgery             Devin Masterson MD

 

                    2010           Hospital            Devin Masterson MD

 

                    2010           [facility]            Devin Masterson MD

 

                    10/22/2009          Office visit        Devin Angel DO

## 2019-06-26 NOTE — DISCHARGE INST-SURGICAL
Discharge Inst-Surgical


Depart Medication/Instructions


New, Converted or Re-Newed RX:  RX Given to Pt/Family


Patient Instructions


Follow up Appt:


Make appointment for 1 week. [phone]





Instructions:


May shower in 24 hours, no tub bath or soaking.


Use incentive spirometer at home as directed.


No Smoking





Skin/Wound Care:


May remove bandages in am.  You need to leave the Dermabond on incision it will 

fall off on it's own. 





Symptoms to Report:


Appetite Changes, Extremity Discoloration, Numbness/Tingling, Swelling 

Increased, Bleeding Excessive, Eyesight Changes, Pain Increased, Urine Color 

Change, Constipation(Persistent), Fever over 101 degree F, Pain/Pressure in 

chest, Urinating Difficulty, Cough Up/Vomit Blood, Heart Beat Irreg/Pounding, P

ain/Pressure in jaw, Cramps in feet or legs, Lightheadedness, Pain/Pressure in 

shoulder, Diarrhea(Persistent), Memory Changes Suddenly, Questions/Concerns, 

Weight gain consecutive days, Dizziness/Fainting, Nausea/Vomiting, Shortness of 

Breath, Weight gain over 2 pounds








If questions or concerns contact your physician 


Or seek help at emergency department.





Activity


Driving Instructions:  No Driving/Refer to Dr. Celeste


Discharge Diet:  No Restrictions


If Any Problems/Questions/Issu:  Contact Your Physician, Go to Emergency Room





Skin/Wound Care


Infection Signs and Symptoms:  Increased Redness, Foul Odor of Wound, Increased 

Drainage, Skin Itchy or Has a Rash, Increased Swelling, Temperature Above 101  F


Bathing Instructions:  Shower


Stitches/Staples/Dermabond Dis:  MOSES Grant DO               Jun 26, 2019 08:41

## 2019-06-26 NOTE — XMS REPORT
MU2 Ambulatory Summary

                             Created on: 2015



Pauline Gan

External Reference #: [id number]

: 1950

Sex: Female



Demographics







                          Address                   [address]

[place]  [address]

 

                          Home Phone                [phone]

 

                          Preferred Language        English

 

                          Marital Status            Legally 

 

                          Scientology Affiliation     Unknown

 

                          Race                      White

 

                          Ethnic Group              Not  or 





Author







                          Author                    Bhupinder Louise

 

                          [facility]

 

                          Address                   [address]

[place], [place]  [id number]



 

                          Phone                     [phone]







Care Team Providers







                    Care Team Member Name    Role                Phone

 

                    Bhupinder Louise    PCP                 Unavailable







Allergies and Adverse Reactions







                    Name                Reaction            Notes

 

                    NO KNOWN DRUG ALLERGIES                         







Plan of Treatment







             Planned Activity    Comments     Planned Date    Planned Time    Plan/Goal

 

             COMPLETE CBC W/AUTO DIFF WBC                 2013     12:00 AM      

 

             ASSAY OF LITHIUM                 2013     12:00 AM      

 

             METABOLIC PANEL TOTAL CA                 2013     12:00 AM      

 

             VITAMIN B-12                 2013     12:00 AM      

 

             ASSAY OF FOLIC ACID SERUM                 2013     12:00 AM      







Medications







                                        Active 

 

             Name         Start Date    Estimated Completion Date    SIG          Comments

 

                          syringe with needle (disp) Misc.(Non-Drug; Combo Route) Syringe 1 mL 25 X 1"    2011

                                        use for injection once a month     

 

                Latuda oral tablet 20 mg                                    take 1 tablet (20 mg) by oral route once daily with

 food (at least 350 calories)            

 

             pravastatin oral tablet 40 mg    3/30/2015                 TAKE 1 TABLET BY MOUTH DAILY     

 

                lithium carbonate Oral capsule 300 mg    2015       take 1 capsule (300

 mg) by oral route 2 for 30 days         









                                         

 

             Name         Start Date    Expiration Date    SIG          Comments

 

             Reglan 10mg    3/29/2010    2010    one ac and hs     

 

                Keflex Oral Capsule 500 mg    2010       10/1/2010       take 1 capsule (500 mg) by oral

 route every 6 hours for 10 days         

 

                Bactrim DS Oral Tablet 800-160 mg    2011       take 1 tablet by oral route

 every 12 hours for 7 days               

 

                triamcinolone acetonide Topical Cream 0.1 %    2011      apply a thin

 layer to the affected area(s) by topical route 2 times per day     

 

                sertraline Oral tablet 100 mg    4/10/2012       5/10/2012       take 1.5 tablets by oral route

 daily for 30 days                       

 

                ergocalciferol (vitamin D2) Oral capsule 50,000 unit    4/15/2013       2013       TAKE

 ONE CAPSULE BY MOUTH ONCE A WEEK        

 

                CYANOCOBALAM 1000MCGINJ 1000 milliliter    2013       INJECT 1ML INTRAMUSCULAR

 ONCE A MONTH                            

 

                pravastatin Oral tablet 40 mg    3/25/2014       3/20/2015       TAKE ONE TABLET BY MOUTH EVERY

 DAY                                     

 

                          Zostavax (PF) subcutaneous suspension for reconstitution 19,400 unit/0.65 mL    3/23/2015

                    3/24/2015           inject 0.65 milliliter by subcutaneous route once     









                                        Discontinued 

 

             Name         Start Date    Discontinued Date    SIG          Comments

 

                Tylenol Oral Tablet 325 mg                    2013        take 1 - 2 tablets (325 -650 mg) by oral

 route every 4-6 hours as needed         

 

                Calcium 600 + D(3) Oral Tablet 600-400 mg-unit                    2011       take 1 tablet by oral

 route 2 times a day                    no longer taking

 

                Vitamin B-12 Oral Tablet Sustained Release 1,000 mcg    2010       take 

1 tablet by oral route daily            no longer taking

 

                Antifungal (Clotrimazole) Topical Cream 1 %    2010       apply to the 

affected and surrounding areas of skin by topical route 2 times per day morning 
and evening                              

 

                sertraline Oral Tablet 100 mg    5/10/2011       2011       take 2 tablets (200 mg) by 

oral route once daily                   discontinued by Dr. Serrano

 

                mirtazapine Oral Tablet 15 mg                    2011        take 1 tablet (15 mg) by oral route 

once daily before bedtime               Dr. Serrano

 

                mirtazapine Oral Tablet 15 mg                    2011        take 1 tablet (15 mg) by oral route 

once daily before bedtime               dc'd by Dr. Zach Sarahstiq Oral Tablet Extended Release 24 hr 50 mg                    2013        take 1 tablet (50

 mg) by oral route once daily           Dr. Zach Stoutiq Oral Tablet Extended Release 24 hr 50 mg                    2013        take 1 tablet (50

 mg) by oral route once daily           dose updated

 

                Vitamin B-12 Injection Solution 1,000 mcg/mL    2011        inject 1 milliliter

 (1,000 mcg) by intramuscular route once a month    on list already

 

                clotrimazole Topical Cream 1 %    2011        apply to the affected and surrounding

 areas of skin by topical route 2 times per day in the morning and evening     

 

                Vitamin D Oral Capsule 50,000 unit    2011        take 1 capsule (50,000 

unit) by oral route once weekly         generic on list

 

                Pravachol Oral Tablet 40 mg    2012        take 1 tablet (40 mg) by oral 

route once daily for 90 days            generic on list

 

                lithium carbonate Oral Capsule 300 mg    2012        take 1 capsule by oral

 route daily                            dose updated

 

                Pristiq Oral tablet extended release 24 hr 100 mg                    4/10/2012       take 1 and 1/2 

tablet (150 mg) by oral route once daily    Mental Health provider

 

                Pristiq Oral tablet extended release 24 hr 100 mg                    4/10/2012       take 1 and 1/2 

tablet (150 mg) by oral route once daily    Discontinued by Dr Efrain Knight at [facility]

 

                hydroxyzine HCl oral tablet 50 mg    10/16/2014      2015       take 1 tablet (50 mg) 

by oral route at bedtime                 







Problem List







                    Description         Status              Onset

 

                    Artificial opening status; colostomy    Active               

 

                    Bipolar disorder, unspecified    Active               

 

                    Hyperlipidemia      Active               

 

                    Peritoneal Neoplasm, Malignant    Active               

 

                    Anemia, Pernicious    Active               

 

                    Arthritis unspecified    Active               

 

                    B12 deficiency      Active               







Vital Signs







      Date    Time    BP-Sys(mm[Hg]    BP-Lynn(mm[Hg])    HR(bpm)    RR(rpm)    Temp    WT    HT    HC    BMI

                    BSA                 BMI Percentile      O2 Sat(%)

 

        2015    3:25:00 PM    120 mmHg    62 mmHg    72 bpm    16 rpm    98.1 F    136 lbs    69 in

                          20.08 kg/m2    1.73 m2                   98 %

 

       3/23/2015    2:55:00 PM    130 mmHg    76 mmHg    68 bpm    18 rpm    97 F    140 lbs    69 in    

                20.6742 kg/m    1.7583 m                      98 %

 

        10/16/2014    11:11:00 AM    120 mmHg    66 mmHg    77 bpm    20 rpm    98 F    130 lbs    69 in

                          19.20 kg/m2    1.69 m2                   100 %

 

        2014    3:21:00 PM    130 mmHg    66 mmHg    63 bpm    18 rpm    97.2 F    160 lbs    69 in

                          23.6276 kg/m    1.8797 m                 99 %

 

        2013    10:35:00 AM    132 mmHg    70 mmHg    66 bpm    20 rpm    98.1 F    157 lbs    69 in

                          23.18 kg/m2    1.86 m2                    

 

        2013    1:29:00 PM    132 mmHg    70 mmHg    76 bpm    18 rpm    98.2 F    166 lbs    69 in 

                          24.5137 kg/m    1.9146 m                  

 

       2013    2:46:00 PM    128 mmHg    70 mmHg    76 bpm    16 rpm    98 F    160 lbs    69 in     

                23.63 kg/m2     1.88 m2                          

 

        2011    8:49:00 AM    128 mmHg    78 mmHg    70 bpm    18 rpm    97.9 F    164 lbs    69 in

                          24.2183 kg/m    1.903 m                  

 

     2011    1:31:00 PM    132 mmHg    68 mmHg    84 bpm         97 F    167 lbs                        

                                         

 

        2011    9:09:00 AM    128 mmHg    70 mmHg    72 bpm    18 rpm    98.2 F    163 lbs    64 in 

                          27.9786 kg/m    1.8272 m                  

 

       2011    10:01:00 AM    132 mmHg    70 mmHg    72 bpm    18 rpm    98.2 F    154 lbs             

                                                                 

 

       2011    2:47:00 PM    128 mmHg    70 mmHg    72 bpm    18 rpm    97.8 F    156 lbs             

                                                                 

 

       5/10/2011    3:16:00 PM    144 mmHg    80 mmHg    72 bpm    18 rpm    98.2 F    158 lbs             

                                                                 

 

        2011    10:11:00 AM    132 mmHg    70 mmHg    70 bpm    18 rpm    98.2 F    168 lbs    69 in

                          24.809 kg/m    1.9261 m                  

 

        4/15/2011    10:52:00 AM    110 mmHg    60 mmHg    75 bpm    16 rpm    97.5 F    172.375 lbs    

69 in                     25.46 kg/m2    1.95 m2                   100 %

 

        2011    11:43:00 AM    120 mmHg    82 mmHg    75 bpm    16 rpm    97.2 F    178.5 lbs    69

 in                       26.3596 kg/m    1.9854 m                 100 %

 

        10/15/2010    1:32:00 PM    120 mmHg    70 mmHg    80 bpm    18 rpm    96.6 F    177 lbs    69 in

                          26.14 kg/m2    1.98 m2                   100 %

 

        2010    3:50:00 PM    168 mmHg    100 mmHg    82 bpm    18 rpm    97.8 F    177.5 lbs    69

 in                       26.2119 kg/m    1.9798 m                 97 %

 

        2010    1:21:00 PM    140 mmHg    80 mmHg    59 bpm    16 rpm    97.6 F    173.25 lbs    69 

in                        25.58 kg/m2    1.96 m2                   100 %

 

        2010    3:02:00 PM    140 mmHg    80 mmHg    61 bpm    16 rpm    97.6 F    173.125 lbs    69

 in                       25.5658 kg/m    1.9553 m                 99 %

 

        2010    1:23:00 PM    130 mmHg    80 mmHg    66 bpm    16 rpm    96.8 F    173 lbs    69 in 

                          25.55 kg/m2    1.95 m2                   100 %

 

        2010    12:58:00 PM    130 mmHg    88 mmHg    75 bpm    16 rpm    98.4 F    172.25 lbs    69

 in                       25.4366 kg/m    1.9503 m                 100 %







Social History







                    Name                Description         Comments

 

                    denies alcohol use                         

 

                    denies smoking                           

 

                    Denies illicit substance abuse                         

 

                    retired                                 direct care

 

                    Single                                   

 

                    Exercises regularly                         

 

                    Attended some college                         







History of Procedures







                    Date Ordered        Description         Order Status

 

                    2010 12:00 AM    COMPREHEN METABOLIC PANEL    Reviewed

 

                    2010 12:00 AM    COMPLETE CBC W/AUTO DIFF WBC    Reviewed

 

                    2010 12:00 AM    LIPID PANEL         Reviewed

 

                    2011 12:00 AM    MAMMOGRAM SCREENING    Reviewed

 

                    2011 12:00 AM    CYTOPATH C/V THIN LAYER    Reviewed

 

                    2011 12:00 AM    THER/PROPH/DIAG INJ SC/IM    Reviewed

 

                    10/20/2011 12:00 AM    THER/PROPH/DIAG INJ SC/IM    Reviewed

 

                    2011 12:00 AM    THER/PROPH/DIAG INJ SC/IM    Reviewed

 

                    2012 12:00 AM    THER/PROPH/DIAG INJ SC/IM    Reviewed

 

                    2012 12:00 AM    THER/PROPH/DIAG INJ SC/IM    Reviewed

 

                    5/3/2012 12:00 AM    THER/PROPH/DIAG INJ SC/IM    Reviewed

 

                    2012 12:00 AM    IMMUNOTHERAPY INJECTIONS    Reviewed

 

                    2012 12:00 AM    THER/PROPH/DIAG INJ SC/IM    Reviewed

 

                    2012 12:00 AM    THER/PROPH/DIAG INJ SC/IM    Reviewed

 

                    2012 12:00 AM    THER/PROPH/DIAG INJ SC/IM    Reviewed

 

                    10/16/2012 12:00 AM    THER/PROPH/DIAG INJ SC/IM    Reviewed

 

                    2010 12:00 AM    COMPREHEN METABOLIC PANEL    Reviewed

 

                    2010 12:00 AM    COMPLETE CBC W/AUTO DIFF WBC    Reviewed

 

                    2010 12:00 AM    LIPID PANEL         Reviewed

 

                    2013 12:00 AM    COMPLETE CBC W/AUTO DIFF WBC    Reviewed

 

                    2013 12:00 AM    ASSAY OF LITHIUM    Reviewed

 

                    2013 12:00 AM    METABOLIC PANEL TOTAL CA    Reviewed

 

                    4/3/2013 12:00 AM    THER/PROPH/DIAG INJ SC/IM    Reviewed

 

                    2013 12:00 AM    THER/PROPH/DIAG INJ SC/IM    Reviewed

 

                    2013 12:00 AM    THER/PROPH/DIAG INJ SC/IM    Reviewed

 

                    2013 12:00 AM    LIPID PANEL         Reviewed

 

                    2013 12:00 AM    VITAMIN D 25 HYDROXY    Reviewed

 

                    2013 12:00 AM    THER/PROPH/DIAG INJ SC/IM    Reviewed

 

                    3/6/2014 12:00 AM    THER/PROPH/DIAG INJ SC/IM    Reviewed

 

                    2014 12:00 AM    THER/PROPH/DIAG INJ SC/IM    Reviewed

 

                    2010 12:00 AM    SKIN FUNGI CULTURE    Reviewed

 

                    10/9/2010 12:00 AM    COMPREHEN METABOLIC PANEL    Reviewed

 

                    10/9/2010 12:00 AM    LIPID PANEL         Reviewed

 

                    2010 12:00 AM    THER/PROPH/DIAG INJ SC/IM    Reviewed

 

                    2010 12:00 AM    THER/PROPH/DIAG INJ SC/IM    Reviewed

 

                    10/15/2010 12:00 AM    FLU VACCINE 3 YRS & > IM    Reviewed

 

                    1/15/2011 12:00 AM    COMPLETE CBC W/AUTO DIFF WBC    Reviewed

 

                    1/15/2011 12:00 AM    COMPREHEN METABOLIC PANEL    Reviewed

 

                    1/15/2011 12:00 AM    LIPID PANEL         Reviewed

 

                    2014 12:00 AM    MAMMOGRAM SCREENING    Reviewed

 

                    7/10/2014 12:00 AM    THER/PROPH/DIAG INJ SC/IM    Reviewed

 

                    2011 12:00 AM    COMPLETE CBC W/AUTO DIFF WBC    Reviewed

 

                    2011 12:00 AM    COMPREHEN METABOLIC PANEL    Reviewed

 

                    2011 12:00 AM    LIPID PANEL         Reviewed

 

                    10/19/2014 12:00 AM    MAMMOGRAM SCREENING    Reviewed

 

                    10/16/2014 12:00 AM    COMPLETE CBC W/AUTO DIFF WBC    Reviewed

 

                    10/16/2014 12:00 AM    COMPREHEN METABOLIC PANEL    Reviewed

 

                    10/16/2014 12:00 AM    IMMUNOASSAY TUMOR     Reviewed

 

                    10/16/2014 12:00 AM    LIPID PANEL         Reviewed

 

                    10/16/2014 12:00 AM    ASSAY OF LITHIUM    Reviewed

 

                    10/16/2014 12:00 AM    MAMMOGRAM SCREENING    Reviewed

 

                    2011 12:00 AM    ASSAY OF PARATHORMONE    Reviewed

 

                    2011 12:00 AM    VITAMIN D 25 HYDROXY    Reviewed

 

                    2011 12:00 AM    ASSAY OF LITHIUM    Reviewed

 

                    2011 12:00 AM    METABOLIC PANEL TOTAL CA    Reviewed

 

                    2011 12:00 AM    CT HEAD/BRAIN W/O & W/DYE    Reviewed

 

                    3/23/2015 12:00 AM    PNEUMOCOCCAL VACC 13 GLENDY IM    Reviewed

 

                    2011 12:00 AM    ASSAY OF LITHIUM    Reviewed

 

                    2015 12:00 AM    THER/PROPH/DIAG INJ SC/IM    Reviewed

 

                    2015 12:00 AM    COMPLETE CBC W/AUTO DIFF WBC    Reviewed

 

                    2015 12:00 AM    COMPREHEN METABOLIC PANEL    Reviewed

 

                    2015 12:00 AM    LIPID PANEL         Reviewed

 

                    2015 12:00 AM    ASSAY OF LITHIUM    Reviewed

 

                    2011 12:00 AM    VIT D 1 25-DIHYDROXY    Reviewed

 

                    2011 12:00 AM    VITAMIN B-12        Reviewed

 

                    2015 12:00 AM    THER/PROPH/DIAG INJ SC/IM    Reviewed

 

                    2011 12:00 AM    THER/PROPH/DIAG INJ SC/IM    Reviewed







Results Summary







                          Data and Description      Results

 

                          2004 12:00 AM        Colonoscopy-Women and Men over 50 Normal 

 

                          2008 12:00 AM         Pap Smear Declined 

 

                          10/7/2009 12:00 AM        Cholest Cry Stone Ql .0 %LDLc SerPl-mCnc 123.0 mg/dLHDLc

 SerPl-mCnc 34.0 mg/dLTrigl SerPl-mCnc 190.0 mg/dLGlucose SerPl-mCnc 78.0 mg/dL

 

                          2009 12:00 AM        Mammogram -Women over 40 Normal HIV1+2 Ab Ser Ql no risk 

 

                          2010 8:47 AM         Dexa Bone Scan Refused Aspirin reccommended Contraindication 



 

                          2010 8:48 AM         Depression Done 

 

                          2010 12:00 AM         Foot Exam-Diabetic Done 

 

                          2010 12:00 AM         Cholest Cry Stone Ql .0 %LDLc SerPl-mCnc 126.0 mg/dLGlucose

 SerPl-mCnc 102.0 mg/dL

 

                          2010 8:45 AM          TRIGLYCERIDES 122.0 mg/dLCHOLESTEROL 186.0 mg/dLHDL 36.0 mg/dLLDL

 (CALC) 126.0 mg/dLGLUCOSE 102.0 mg/dLSODIUM 143.0 mmol/LPOTASSIUM 3.70 
mmol/LCHLORIDE 111.0 mmol/LCO2 23.0 mmol/LBUN 10.0 mg/dLCREATININE 0.80 
mg/dLSGOT/AST 12.0 IU/LSGPT/ALT 11.0 IU/LALK PHOS 65.0 IU/LTOTAL PROTEIN 7.20 
g/dLALBUMIN 3.90 g/dLTOTAL BILI 0.50 mg/dLCALCIUM 10.20 mg/dLeGFR >60 
mL/min/1.73 m2WBC 5.7 RBC 3.26 HGB 10.60 g/dLHCT 31.70 %MCV 97.0 fLMCH 32.50 
pgMCHC 33.40 g/dLRDW CV 13.30 %MPV 9.70 fLPLT 287 %NEUT 62.90 %%LYMP 21.80 
%%MONO 9.90 %%EOS 5.0 %%BASO 0.40 %#NEUT 3.56 #LYMP 1.23 #MONO 0.56 #EOS 0.28 
#BASO 0.02 

 

                          2010 12:00 AM        Glucose SerPl-mCnc 96.0 mg/dLCholest Cry Stone Ql .0 %LDLc

 SerPl-mCnc 146.0 mg/dL

 

                          2010 8:26 AM         TRIGLYCERIDES 106.0 mg/dLCHOLESTEROL 199.0 mg/dLHDL 32.0 mg/dLLDL

 (CALC) 146.0 mg/dLGLUCOSE 96.0 mg/dLSODIUM 143.0 mmol/LPOTASSIUM 4.0 
mmol/LCHLORIDE 113.0 mmol/LCO2 24.0 mmol/LBUN 13.0 mg/dLCREATININE 1.0 
mg/dLSGOT/AST 11.0 IU/LSGPT/ALT 6.0 IU/LALK PHOS 56.0 IU/LTOTAL PROTEIN 6.60 
g/dLALBUMIN 3.80 g/dLTOTAL BILI 0.50 mg/dLCALCIUM 9.30 mg/dLeGFR 57 

 

                          10/6/2010 12:00 AM        Cholest Cry Stone Ql .0 %LDLc SerPl-mCnc 111.0 mg/dLGlucose

 SerPl-mCnc 81.0 mg/dL

 

                          10/6/2010 2:45 PM         TRIGLYCERIDES 123.0 mg/dLCHOLESTEROL 178.0 mg/dLHDL 42.0 mg/dLLDL

 (CALC) 111.0 mg/dLGLUCOSE 81.0 mg/dLSODIUM 139.0 mmol/LPOTASSIUM 4.10 
mmol/LCHLORIDE 106.0 mmol/LCO2 24.0 mmol/LBUN 13.0 mg/dLCREATININE 0.90 
mg/dLSGOT/AST 13.0 IU/LSGPT/ALT 11.0 IU/LALK PHOS 61.0 IU/LTOTAL PROTEIN 7.10 
g/dLALBUMIN 3.90 g/dLTOTAL BILI 0.30 mg/dLCALCIUM 9.30 mg/dLeGFR >60 mL/min/1.73
 m2WBC 6.9 RBC 3.59 HGB 11.50 g/dLHCT 35.30 %MCV 98.0 fLMCH 32.0 pgMCHC 32.60 
g/dLRDW CV 12.90 %MPV 9.90 fLPLT 311 %NEUT 64.90 %%LYMP 22.50 %%MONO 7.20 %%EOS 
5.10 %%BASO 0.30 %#NEUT 4.45 #LYMP 1.54 #MONO 0.49 #EOS 0.35 #BASO 0.02 

 

                          2011 12:00 AM         Mammogram -Women over 40 Ordered 

 

                          2011 10:25 AM        TRIGLYCERIDES 111.0 mg/dLCHOLESTEROL 195.0 mg/dLHDL 43.0 mg/dLLDL

 (CALC) 130.0 mg/dLWBC 5.3 RBC 3.76 HGB 12.0 g/dLHCT 37.80 %.0 fLMCH 
31.90 pgMCHC 31.70 g/dLRDW CV 13.0 %MPV 9.70 fLPLT 259 %NEUT 69.0 %%LYMP 17.60 
%%MONO 8.30 %%EOS 4.70 %%BASO 0.40 %#NEUT 3.63 #LYMP 0.93 #MONO 0.44 #EOS 0.25 
#BASO 0.02 GLUCOSE 102.0 mg/dLSODIUM 146.0 mmol/LPOTASSIUM 4.20 mmol/LCHLORIDE 
113.0 mmol/LCO2 23.0 mmol/LBUN 15.0 mg/dLCREATININE 1.0 mg/dLSGOT/AST 12.0 
IU/LSGPT/ALT 17.0 IU/LALK PHOS 60.0 IU/LTOTAL PROTEIN 6.90 g/dLALBUMIN 4.20 
g/dLTOTAL BILI 0.40 mg/dLCALCIUM 9.70 mg/dLeGFR 57 

 

                          2011 11:49 AM        Cholest Cry Stone Ql .0 %LDLc SerPl-mCnc 130.0 mg/dLHDLc

 SerPl-mCnc 43.0 mg/dLTrigl SerPl-mCnc 111.0 mg/dLGlucose SerPl-mCnc 102.0 mg/dL

 

                          2011 11:52 AM        Pap Smear Declined 

 

                          2011 11:28 AM        Lithium 2.080 mmol/LGLUCOSE 102.0 mg/dLSODIUM 135.0 mmol/LPOTASSIUM

 3.90 mmol/LCHLORIDE 106.0 mmol/LCO2 21.0 mmol/LBUN 12.0 mg/dLCREATININE 1.30 
mg/dLCALCIUM 10.70 mg/dLeGFR 42 

 

                          2011 8:58 AM          Lithium 0.690 mmol/L

 

                          2011 2:38 PM         VITAMIN B12 3483.0 pg/mL

 

                          2013 3:35 PM          WBC 5.1 RBC 3.73 HGB 11.70 g/dLHCT 36.40 %MCV 98.0 fLMCH 31.40

 pgMCHC 32.10 g/dLRDW CV 13.10 %MPV 9.80 fLPLT 224 %NEUT 66.80 %%LYMP 19.10 
%%MONO 9.0 %%EOS 4.90 %%BASO 0.20 %#NEUT 3.42 #LYMP 0.98 #MONO 0.46 #EOS 0.25 
#BASO 0.01 GLUCOSE 88.0 mg/dLSODIUM 141.0 mmol/LPOTASSIUM 4.10 mmol/LCHLORIDE 
110.0 mmol/LCO2 22.0 mmol/LBUN 22.0 mg/dLCREATININE 1.10 mg/dLCALCIUM 9.80 
mg/dLeGFR 50 Lithium 0.760 mmol/L

 

                          2013 11:02 AM        TRIGLYCERIDES 106.0 mg/dLCHOLESTEROL 181.0 mg/dLHDL 46.0 mg/dLLDL

 (CALC) 114.0 mg/dLVITAMIN D 41.10 ng/mL

 

                          10/17/2014 10:10 AM       WBC 5.0 RBC 3.66 HGB 11.60 g/dLHCT 36.80 %.0 fLMCH 31.70

 pgMCHC 31.50 g/dLRDW CV 13.50 %MPV 10.10 fLPLT 209 %NEUT 69.20 %%LYMP 21.0 
%%MONO 6.40 %%EOS 3.20 %%BASO 0.20 %#NEUT 3.46 #LYMP 1.05 #MONO 0.32 #EOS 0.16 
#BASO 0.01 GLUCOSE 100.0 mg/dLSODIUM 148.0 mmol/LPOTASSIUM 3.90 mmol/LCHLORIDE 
114.0 mmol/LCO2 26.0 mmol/LBUN 12.0 mg/dLCREATININE 1.20 mg/dLSGOT/AST 9.0 
IU/LSGPT/ALT <6 IU/LALK PHOS 82.0 IU/LTOTAL PROTEIN 6.90 g/dLALBUMIN 4.0 
g/dLTOTAL BILI 0.40 mg/dLCALCIUM 10.50 mg/dLeGFR 45 TRIGLYCERIDES 96.0 
mg/dLCHOLESTEROL 155.0 mg/dLHDL 38.0 mg/dLLDL (CALC) 98.0 mg/dLLithium 0.850 
mmol/LCancer Antigen (CA) 125 8.30 U/mL

 

                          2015 10:25 AM        Lithium 0.790 mmol/LWBC 4.8 RBC 3.44 HGB 11.0 g/dLHCT 35.20 

%.0 fLMCH 32.0 pgMCHC 31.30 g/dLRDW CV 14.0 %MPV 9.30 fLPLT 210 %NEUT 
70.80 %%LYMP 17.20 %%MONO 8.10 %%EOS 3.50 %%BASO 0.40 %#NEUT 3.41 #LYMP 0.83 
#MONO 0.39 #EOS 0.17 #BASO 0.02 TRIGLYCERIDES 107.0 mg/dLCHOLESTEROL 174.0 
mg/dLHDL 43.0 mg/dLLDL (CALC) 110.0 mg/dLGLUCOSE 90.0 mg/dLSODIUM 145.0 
mmol/LPOTASSIUM 3.80 mmol/LCHLORIDE 115.0 mmol/LCO2 24.0 mmol/LBUN 17.0 mg
/dLCREATININE 1.30 mg/dLSGOT/AST 18.0 IU/LSGPT/ALT 17.0 IU/LALK PHOS 56.0 
IU/LTOTAL PROTEIN 6.70 g/dLALBUMIN 3.90 g/dLTOTAL BILI 0.40 mg/dLCALCIUM 9.80 
mg/dLeGFR 41 







History Of Immunizations







       Name    Date Admin    Mfg Name    Mfg Code    Trade Name    Lot#    Route    Inj    Vis Given    Vis

 Pub                                    CVX

 

        Influenza    2008    Not Entered    NE      Not Entered            Not Entered    Not Entered

                    1            999

 

          Pneumococcal    2008    [employer]    MSD       Pneumovax 23              Intramuscular

                Not Entered     1        999

 

           Influenza    10/15/2010    [employer].    NOV        Fluvirin > 12 Years    

[id number]     Intramuscular    Left Deltoid    10/15/2010    2009    999

 

          Pneumococcal    3/23/2015    [facility]    [facility]       Prevnar 13    [id number]    Intramuscular

                Right Gluteous Medius    3/23/2015       2013       109







History of Past Illness







                    Name                Date of Onset       Comments

 

                    Peritoneal Neoplasm, Malignant                         

 

                    Hyperlipidemia                           

 

                    Bipolar disorder, unspecified                         

 

                    Artificial opening status; colostomy                         

 

                    B12 deficiency                           

 

                    Anemia, Pernicious                         

 

                    Arthritis unspecified                         

 

                    cervical cancer                          

 

                    Artificial opening status; colostomy    2010  1:10PM     

 

                    Bipolar disorder, unspecified    2010  1:10PM     

 

                    Hyperlipidemia      2010  1:10PM     

 

                    Anemia, Pernicious    2010  1:10PM     

 

                    Postoperative Follow-Up    2010  1:55PM     

 

                    Postoperative Follow-Up    Mar  8 2010 10:57AM     

 

                    Artificial opening status; colostomy    Mar  8 2010  1:19PM     

 

                    Peritoneal Neoplasm, Malignant    Mar  8 2010  1:19PM     

 

                    Artificial opening status; colostomy    2010  1:40PM     

 

                    Hyperlipidemia      2010  1:40PM     

 

                    Anemia, Pernicious    2010  1:40PM     

 

                    Peritoneal Neoplasm, Malignant    2010  1:40PM     

 

                    Arthritis unspecified    2010  1:40PM     

 

                    Anemia of Chronic Illness    2010  1:40PM     

 

                    Tinea corporis      2010  3:17PM     

 

                    Bipolar disorder, unspecified    2010  1:33PM     

 

                    Hyperlipidemia      2010  1:33PM     

 

                    Anemia, Pernicious    2010  1:33PM     

 

                    Peritoneal Neoplasm, Malignant    2010  1:33PM     

 

                    B12 deficiency      2010  1:33PM     

 

                    Ethmoidal Sinusitis, Acute    Sep 21 2010  3:53PM     

 

                    Wheezing            Sep 21 2010  3:53PM     

 

                    Flu                 Oct 15 2010  1:40PM     

 

                    Bipolar disorder, unspecified    Oct 15 2010  1:42PM     

 

                    Hyperlipidemia      Oct 15 2010  1:42PM     

 

                    Anemia, Pernicious    Oct 15 2010  1:42PM     

 

                    Peritoneal Neoplasm, Malignant    Oct 15 2010  1:42PM     

 

                    Bipolar disorder, unspecified    2011 12:01PM     

 

                    Hyperlipidemia      2011 12:01PM     

 

                    Anemia, Pernicious    2011 12:01PM     

 

                    Peritoneal Neoplasm, Malignant    2011 12:01PM     

 

                    Bipolar disorder, unspecified    Apr 15 2011 10:55AM     

 

                    Major Depression    2011 10:11AM     

 

                    Bipolar Disorder    2011 10:11AM     

 

                    Cancer              May 10 2011  4:16PM     

 

                    Major Depression    May 10 2011  3:16PM     

 

                    Bipolar Disorder    May 10 2011  3:16PM     

 

                    Hypercalcemia       May 23 2011  2:47PM     

 

                    Bipolar disorder, unspecified    May 23 2011  2:47PM     

 

                    Colon Cancer, Personal History    May 23 2011  2:47PM     

 

                    Bipolar Disorder    May 31 2011  4:39PM     

 

                    Depressive Disorder    2011 10:01AM     

 

                    Vitamin B12 deficiency    2011 10:01AM     

 

                    Vitamin D Deficiency    2011  5:07PM     

 

                    Anemia, Vitamin B12 Deficiency    2011  5:07PM     

 

                    B12 deficiency      2011  3:56PM     

 

                    Routine gynecological examination    Aug  4 2011  9:08AM     

 

                    Screening Examination for Breast Cancer    Aug  4 2011  9:08AM     

 

                    Tinea Corporis      Aug  4 2011  9:08AM     

 

                    Depressive Disorder    Sep 23 2011  8:47AM     

 

                    Contact Dermatitis    Sep 23 2011  8:47AM     

 

                    Anemia, Pernicious    Sep 23 2011  8:47AM     

 

                    B12 deficiency      Sep 23 2011  8:47AM     

 

                    B12 deficiency      Sep 27 2011  2:58PM     

 

                    B12 deficiency      Oct 20 2011  2:34PM     

 

                    Flu                 Dec  9 2011  3:16PM     

 

                    B12 deficiency      Dec  9 2011  3:17PM     

 

                    B12 deficiency      2012  4:52PM     

 

                    B12 deficiency      2012 11:10AM     

 

                    B12 deficiency      2012  3:37PM     

 

                    B12 deficiency      May  3 2012  4:10PM     

 

                    B12 deficiency      2012  2:54PM     

 

                    B12 deficiency      2012 11:23AM     

 

                    B12 deficiency      Aug  9 2012  2:08PM     

 

                    B12 deficiency      Sep  6 2012  4:36PM     

 

                    B12 deficiency      Oct 16 2012 10:23AM     

 

                    Flu                 Feb  2013  3:11PM     

 

                    Bipolar disorder, unspecified    Feb  2013  2:48PM     

 

                    Anemia, Pernicious    Feb  2013  2:48PM     

 

                    B12 deficiency      Feb  2013  2:48PM     

 

                    Extrapyramidal abnormal movement disorder    Feb  2013  2:48PM     

 

                    B12 deficiency      Apr  3 2013 12:03PM     

 

                    Bipolar disorder, unspecified    May  7 2013  1:31PM     

 

                    Anemia, Pernicious    May  7 2013  1:31PM     

 

                    B12 deficiency      May  7 2013  1:31PM     

 

                    Extrapyramidal abnormal movement disorder    May  7 2013  1:31PM     

 

                    B12 deficiency      2013  3:42PM     

 

                    B12 deficiency      2013  1:31PM     

 

                    Hyperlipidemia      Aug  7 2013 10:37AM     

 

                    Vitamin D Deficiency    Aug  7 2013 10:37AM     

 

                    Bipolar disorder, unspecified    Aug  7 2013 10:37AM     

 

                    Anemia, Pernicious    Aug  7 2013 10:37AM     

 

                    B12 deficiency      Aug  7 2013 10:37AM     

 

                    B12 deficiency      Sep 25 2013 11:15AM     

 

                    B12 deficiency      Dec 11 2013  3:16PM     

 

                    B12 deficiency      Mar  6 2014  1:48PM     

 

                    B12 deficiency      May 21 2014  3:17PM     

 

                    Screening Examination for Breast Cancer    2014  3:23PM     

 

                    Periumbilical abdominal pain    2014  3:23PM     

 

                    B12 deficiency      Jul 10 2014  2:52PM     

 

                    Anemia, Vitamin B12 Deficiency    Aug 13 2014  4:50PM     

 

                    Bipolar disorder    Oct 16 2014 11:13AM     

 

                    Hyperlipidemia      Oct 16 2014 11:13AM     

 

                    Anemia, Pernicious    Oct 16 2014 11:13AM     

 

                    Peritoneal Neoplasm, Malignant    Oct 16 2014 11:13AM     

 

                    Screening breast examination    Oct 16 2014 11:13AM     

 

                    Weight loss         Oct 16 2014 11:13AM     

 

                    Anemia, Pernicious    Mar 23 2015  2:57PM     

 

                    B12 deficiency      Mar 23 2015  2:57PM     

 

                    Need for Prevnar vaccine    Mar 23 2015  2:57PM     

 

                    Bipolar disorder    Mar 23 2015  2:57PM     

 

                    Hyperlipidemia      Mar 23 2015  2:57PM     

 

                    Anemia, Pernicious    Mar 23 2015  2:57PM     

 

                    Peritoneal Neoplasm, Malignant    Mar 23 2015  2:57PM     

 

                    B12 deficiency      May  4 2015  4:48PM     

 

                    Hyperlipidemia      May 13 2015  9:56AM     

 

                    Anemia              May 13 2015  9:56AM     

 

                    Bipolar disorder    May 13 2015  9:56AM     

 

                    Bipolar disorder    May 14 2015  3:27PM     

 

                    Hyperlipidemia      May 14 2015  3:27PM     

 

                    Anemia, Pernicious    May 14 2015  3:27PM     

 

                    Peritoneal Neoplasm, Malignant    May 14 2015  3:27PM     

 

                    B12 deficiency      2015  2:20PM     

 

                    B12 deficiency      2015 11:34AM     







Payers







           Insurance Name    Company Name    Plan Name    Plan Number    Policy Number    Policy Group

 Number                                 Start Date

 

                    Medicare Part A    Medicare Part A              [id number]              N/A

 

                    [facility]              [id number]              N/A

 

                    Medicare Part B    [employer] [place]              [id number]              2006

 

                          [employer] Financial [employer]    [employer]                 50 percent

                                                    2009







History of Encounters







                    Visit Date          Visit Type          Provider

 

                    2015            Nurse visit         Bhupinder Sapen DO

 

                    2015            Nurse visit         Bhupinder Aspen DO

 

                    2015           Office visit        Bhupinder Aspen DO

 

                    2015            Nurse visit         Bhupinder Aspen DO

 

                    3/23/2015           Office visit        Bhupinder Aspen DO

 

                    10/16/2014          Office visit        Bhupinder Aspen DO

 

                    2014           Nurse visit         Radha GREEN

 

                    7/10/2014           Nurse visit         Bhupinder Aspen DO

 

                    2014           Office visit        Bhupinder Aspen DO

 

                    2014           Nurse visit         Bhupinder Aspen DO

 

                    3/6/2014            Nurse visit         Bhupinder Aspen DO

 

                    2014            [facility]            EARNEST Lopez MD

 

                    2013          Nurse visit         Bhupinder Aspen DO

 

                    2013           Nurse visit         Bhupinder Aspen DO

 

                    2013            Office visit        Bhupinder Aspen DO

 

                    2013            Nurse visit         Bhupinder Aspen DO

 

                    2013            Nurse visit         Bhupinder Aspen DO

 

                    2013            Office visit        Bhupinder Aspen DO

 

                    4/3/2013            Nurse visit         Bhupinder Aspen DO

 

                    2013            Office visit        Bhupinder Aspen DO

 

                    10/16/2012          Nurse visit         Bhupinder Aspen DO

 

                    2012            Voided              Bhupinder Aspen DO

 

                    2012            Nurse visit         Bhupinder Aspen DO

 

                    2012            Nurse visit         Bhupinder Aspen DO

 

                    2012            Nurse visit         Bhupinder Aspen DO

 

                    2012           Nurse visit         Bhupinder Aspen DO

 

                    5/3/2012            Nurse visit         Bhupinder Aspen DO

 

                    2012           Nurse visit         Bhupinder Aspen DO

 

                    2012           Nurse visit         Bhupinder Aspen DO

 

                    2012           Nurse visit         Bhupinder Aspen DO

 

                    2011           Nurse visit         Bhupinder Sapen DO

 

                    10/20/2011          Nurse visit         Bhupinder Louise DO

 

                    2011           Office visit        Bhupinder Aspen DO

 

                    2011           Nurse visit         Radha GREEN

 

                    2011            Office visit        Bhupinder Louise DO

 

                    2011           Nurse visit         Bhupinder Louise DO

 

                    2011            Office visit        Bhupinder Louise DO

 

                    2011           Office visit        Bhupinder Louise DO

 

                    5/10/2011           Office visit        Bhupinder Louise DO

 

                    2011           Office visit        Bhupinder Louise DO

 

                    4/15/2011           Office visit        Devin Angel DO

 

                    2011           Office visit        Devin Angel DO

 

                    10/15/2010          Office visit        Devin Angel DO

 

                    2010           Office visit        Devin Angel DO

 

                    2010            Office visit        Devin Angel DO

 

                    2010           Office visit        Devin Angel DO

 

                    2010            Office visit        Devin Angel DO

 

                    3/8/2010            Office visit        Devin Masterson MD

 

                    2010            Office visit        Devin Angel DO

 

                    2010            Surgery             Devin Masterson MD

 

                    2010           Surgery             Devin Masterson MD

 

                    2010           Hospital            Devin Masterson MD

 

                    2010           Hospital            Devin Masterson MD

 

                    10/22/2009          Office visit        Devin Angel DO

## 2019-06-26 NOTE — XMS REPORT
MU2 Ambulatory Summary

                             Created on: 2016



Pauline Gan

External Reference #: [id number]

: 1950

Sex: Female



Demographics







                          Address                   [address]

[place]  [address]

 

                          Home Phone                [phone]

 

                          Preferred Language        English

 

                          Marital Status            Legally 

 

                          Restorationism Affiliation     Unknown

 

                          Race                      White

 

                          Ethnic Group              Not  or 





Author







                          Author                    Bret Forte

 

                          [facility]

 

                          Address                   [address]

[place]  [id number]



 

                          Phone                     [phone]







Care Team Providers







                    Care Team Member Name    Role                Phone

 

                    Bret Forte    PCP                 [phone]







Allergies and Adverse Reactions







                    Name                Reaction            Notes

 

                    NO KNOWN DRUG ALLERGIES                         







Plan of Treatment







             Planned Activity    Comments     Planned Date    Planned Time    Plan/Goal

 

             THER/PROPH/DIAG INJ SC/IM                 2016    12:00 AM      

 

             THER/PROPH/DIAG INJ SC/IM                 2016    12:00 AM      

 

             MAMMOGRAM BOTH BREASTS                 2016    12:00 AM      

 

             THER/PROPH/DIAG INJ SC/IM                 2016    12:00 AM      

 

             COMPLETE CBC W/AUTO DIFF WBC                 2013     12:00 AM      

 

             ASSAY OF LITHIUM                 2013     12:00 AM      

 

             METABOLIC PANEL TOTAL CA                 2013     12:00 AM      

 

             VITAMIN B-12                 2013     12:00 AM      

 

             ASSAY OF FOLIC ACID SERUM                 2013     12:00 AM      

 

             THER/PROPH/DIAG INJ SC/IM                 2013    12:00 AM      







Medications







                                        Active 

 

             Name         Start Date    Estimated Completion Date    SIG          Comments

 

                Latuda 20 mg oral tablet                                    take 1 tablet (20 mg) by oral route once daily with

 food (at least 350 calories)            

 

             pravastatin 40 mg oral tablet    3/30/2015                 TAKE 1 TABLET BY MOUTH DAILY     

 

                Namenda XR 28 mg oral capsule,sprinkle,ER 24hr    2015                       take 1 capsule (28

 mg) by oral route once daily            

 

                Namenda XR 28 mg oral capsule,sprinkle,ER 24hr    2016                       take 1 capsule (28

 mg) by oral route once daily            

 

                triamcinolone acetonide 0.1 % topical cream    2016                        apply a thin layer to 

the affected area(s) by topical route 2 times per day     

 

                furosemide 40 mg oral tablet    2016      take 1 tablet (40 mg) by oral

 route once daily                        

 

                potassium chloride 10 mEq oral tablet extended release    2016                       take 1 tablet

 (10 meq) by oral route once daily       

 

             pravastatin 40 mg oral tablet    2016                 TAKE 1 TABLET BY MOUTH DAILY     

 

                Vitamin B-12 1,000 mcg/mL injection solution    2016                       inject 1 milliliter 

(1,000 mcg) by intramuscular route once a month     









                                         

 

             Name         Start Date    Expiration Date    SIG          Comments

 

             Reglan 10mg    3/29/2010    2010    one ac and hs     

 

                Keflex 500 mg oral capsule    2010       10/1/2010       take 1 capsule (500 mg) by oral

 route every 6 hours for 10 days         

 

                Bactrim -160 mg oral tablet    2011       take 1 tablet by oral route

 every 12 hours for 7 days               

 

                triamcinolone acetonide 0.1 % topical cream    2011      apply a thin

 layer to the affected area(s) by topical route 2 times per day     

 

                sertraline 100 mg oral tablet    4/10/2012       5/10/2012       take 1.5 tablets by oral route

 daily for 30 days                       

 

                ergocalciferol (vitamin D2) 50,000 unit oral capsule    4/15/2013       2013       TAKE

 ONE CAPSULE BY MOUTH ONCE A WEEK        

 

                CYANOCOBALAM 1000MCGINJ 1000 milliliter    2013       INJECT 1ML INTRAMUSCULAR

 ONCE A MONTH                            

 

                pravastatin 40 mg oral tablet    3/25/2014       3/20/2015       TAKE ONE TABLET BY MOUTH EVERY

 DAY                                     

 

                          Zostavax (PF) 19,400 unit/0.65 mL subcutaneous suspension for reconstitution    3/23/2015

                    3/24/2015           inject 0.65 milliliter by subcutaneous route once     

 

                famciclovir 500 mg oral tablet    12/3/2015       12/10/2015      take 1 tablet (500 mg) by

 oral route every 8 hours for 7 days     

 

                Cipro 500 mg oral tablet    2016       take 1 tablet (500 mg) by oral route

 2 times per day for 5 days              









                                        Discontinued 

 

             Name         Start Date    Discontinued Date    SIG          Comments

 

                Tylenol 325 mg oral tablet                    2013        take 1 - 2 tablets (325 -650 mg) by oral

 route every 4-6 hours as needed         

 

                Calcium 600 + D(3) 600 mg(1,500mg) -400 unit oral tablet                    2011       take 1 tablet

 by oral route 2 times a day            no longer taking

 

                Vitamin B-12 1,000 mcg oral tablet extended release    2010       take 1

 tablet by oral route daily             no longer taking

 

                Antifungal (clotrimazole) 1 % topical cream    2010       apply to the 

affected and surrounding areas of skin by topical route 2 times per day morning 
and evening                              

 

                sertraline 100 mg oral tablet    5/10/2011       2011       take 2 tablets (200 mg) by 

oral route once daily                   discontinued by Dr. Serrano

 

                mirtazapine 15 mg oral tablet                    2011        take 1 tablet (15 mg) by oral route 

once daily before bedtime               Dr. Serrano

 

                mirtazapine 15 mg oral tablet                    2011        take 1 tablet (15 mg) by oral route 

once daily before bedtime               dc'd by Dr. Serrano

 

                Pristiq 50 mg oral tablet extended release 24 hr                    2013        take 1 tablet (50

 mg) by oral route once daily           Dr. Serrano

 

                Pristiq 50 mg oral tablet extended release 24 hr                    2013        take 1 tablet (50

 mg) by oral route once daily           dose updated

 

                Vitamin B-12 1,000 mcg/mL injection solution    2011        inject 1 milliliter

 (1,000 mcg) by intramuscular route once a month    on list already

 

                    syringe with needle 1 mL 25 gauge x 1" miscellaneous syringe    2011

                          use for injection once a month     

 

                clotrimazole 1 % topical cream    2011        apply to the affected and surrounding

 areas of skin by topical route 2 times per day in the morning and evening     

 

                Vitamin D2 50,000 unit oral capsule    2011        take 1 capsule (50,000

 unit) by oral route once weekly        generic on list

 

                Pravachol 40 mg oral tablet    2012        take 1 tablet (40 mg) by oral 

route once daily for 90 days            generic on list

 

                lithium carbonate 300 mg oral capsule    2012        take 1 capsule by oral

 route daily                            dose updated

 

                Pristiq 100 mg oral tablet extended release 24 hr                    4/10/2012       take 1 and 1/2 

tablet (150 mg) by oral route once daily    Mental Health provider

 

                Pristiq 100 mg oral tablet extended release 24 hr                    4/10/2012       take 1 and 1/2 

tablet (150 mg) by oral route once daily    Discontinued by Dr Efrain Knight at [facility]

 

                hydroxyzine HCl 50 mg oral tablet    10/16/2014      2015       take 1 tablet (50 mg) 

by oral route at bedtime                 

 

                lithium carbonate 300 mg oral capsule    2015       take 1 capsule (300

 mg) by oral route 2 for 30 days         

 

                fluconazole 100 mg oral tablet    2015       12/3/2015       take 1 tablet (100 mg) by 

oral route once a week                   

 

                ketoconazole 2 % topical cream    2015       12/3/2015       apply to the affected area(s)

 by topical route 2 times per day        

 

                prednisone 10 mg oral tablet    12/3/2015       2016        take 2 tablets (20 mg) by oral

 route once daily for 4 days 1 tablet daily for 4 days 0.5 tablet daily for 4 
days                                     







Problem List







                    Description         Status              Onset

 

                    Artificial opening status; colostomy    Active               

 

                    Bipolar disorder, unspecified    Active               

 

                    Hyperlipidemia      Active               

 

                    Peritoneal Neoplasm, Malignant    Active               

 

                    Anemia, Pernicious    Active               

 

                    Arthritis unspecified    Active               

 

                    B12 deficiency      Active               







Vital Signs







      Date    Time    BP-Sys(mm[Hg]    BP-Lynn(mm[Hg])    HR(bpm)    RR(rpm)    Temp    WT    HT    HC    BMI

                    BSA                 BMI Percentile      O2 Sat(%)

 

       2016    3:11:00 PM    134 mmHg    76 mmHg    80 bpm    20 rpm    98 F    163 lbs    69 in     

                24.07 kg/m2     1.90 m2                         98 %

 

        2016    2:04:00 PM    142 mmHg    86 mmHg    68 bpm    16 rpm    98.5 F    166 lbs    63 in

                          29.4053 kg/m    1.8295 m                 100 %

 

        2016    11:27:00 AM    148 mmHg    78 mmHg    90 bpm    20 rpm    98.2 F    153 lbs    69 in

                          22.59 kg/m2    1.84 m2                   96 %

 

        12/3/2015    9:50:00 AM    132 mmHg    70 mmHg    62 bpm    16 rpm    97.9 F    145 lbs    69 in

                          21.4125 kg/m    1.7894 m                 100 %

 

        2015    8:52:00 AM    132 mmHg    68 mmHg    52 bpm    20 rpm    97.8 F    141 lbs    69 in

                          20.82 kg/m2    1.76 m2                   100 %

 

        2015    3:25:00 PM    120 mmHg    62 mmHg    72 bpm    16 rpm    98.1 F    136 lbs    69 in

                          20.0835 kg/m    1.733 m                 98 %

 

       3/23/2015    2:55:00 PM    130 mmHg    76 mmHg    68 bpm    18 rpm    97 F    140 lbs    69 in    

                20.67 kg/m2     1.76 m2                         98 %

 

        10/16/2014    11:11:00 AM    120 mmHg    66 mmHg    77 bpm    20 rpm    98 F    130 lbs    69 in

                          19.1974 kg/m    1.6943 m                 100 %

 

        2014    3:21:00 PM    130 mmHg    66 mmHg    63 bpm    18 rpm    97.2 F    160 lbs    69 in

                          23.63 kg/m2    1.88 m2                   99 %

 

        2013    10:35:00 AM    132 mmHg    70 mmHg    66 bpm    20 rpm    98.1 F    157 lbs    69 in

                          23.1846 kg/m    1.862 m                  

 

        2013    1:29:00 PM    132 mmHg    70 mmHg    76 bpm    18 rpm    98.2 F    166 lbs    69 in 

                          24.51 kg/m2    1.91 m2                    

 

       2013    2:46:00 PM    128 mmHg    70 mmHg    76 bpm    16 rpm    98 F    160 lbs    69 in     

                23.6276 kg/m    1.8797 m                       

 

        2011    8:49:00 AM    128 mmHg    78 mmHg    70 bpm    18 rpm    97.9 F    164 lbs    69 in

                          24.22 kg/m2    1.90 m2                    

 

     2011    1:31:00 PM    132 mmHg    68 mmHg    84 bpm         97 F    167 lbs                        

                                         

 

        2011    9:09:00 AM    128 mmHg    70 mmHg    72 bpm    18 rpm    98.2 F    163 lbs    64 in 

                          27.98 kg/m2    1.83 m2                    

 

       2011    10:01:00 AM    132 mmHg    70 mmHg    72 bpm    18 rpm    98.2 F    154 lbs             

                                                                 

 

       2011    2:47:00 PM    128 mmHg    70 mmHg    72 bpm    18 rpm    97.8 F    156 lbs             

                                                                 

 

       5/10/2011    3:16:00 PM    144 mmHg    80 mmHg    72 bpm    18 rpm    98.2 F    158 lbs             

                                                                 

 

        2011    10:11:00 AM    132 mmHg    70 mmHg    70 bpm    18 rpm    98.2 F    168 lbs    69 in

                          24.81 kg/m2    1.93 m2                    

 

        4/15/2011    10:52:00 AM    110 mmHg    60 mmHg    75 bpm    16 rpm    97.5 F    172.375 lbs    

69 in                     25.4551 kg/m    1.951 m                 100 %

 

        2011    11:43:00 AM    120 mmHg    82 mmHg    75 bpm    16 rpm    97.2 F    178.5 lbs    69

 in                       26.36 kg/m2    1.99 m2                   100 %

 

        10/15/2010    1:32:00 PM    120 mmHg    70 mmHg    80 bpm    18 rpm    96.6 F    177 lbs    69 in

                          26.1381 kg/m    1.977 m                 100 %

 

        2010    3:50:00 PM    168 mmHg    100 mmHg    82 bpm    18 rpm    97.8 F    177.5 lbs    69

 in                       26.21 kg/m2    1.98 m2                   97 %

 

        2010    1:21:00 PM    140 mmHg    80 mmHg    59 bpm    16 rpm    97.6 F    173.25 lbs    69 

in                        25.5843 kg/m    1.956 m                 100 %

 

        2010    3:02:00 PM    140 mmHg    80 mmHg    61 bpm    16 rpm    97.6 F    173.125 lbs    69

 in                       25.57 kg/m2    1.96 m2                   99 %

 

        2010    1:23:00 PM    130 mmHg    80 mmHg    66 bpm    16 rpm    96.8 F    173 lbs    69 in 

                          25.5474 kg/m    1.9546 m                 100 %

 

        2010    12:58:00 PM    130 mmHg    88 mmHg    75 bpm    16 rpm    98.4 F    172.25 lbs    69

 in                       25.44 kg/m2    1.95 m2                   100 %







Social History







                    Name                Description         Comments

 

                    denies alcohol use                         

 

                    denies smoking                           

 

                    Denies illicit substance abuse                         

 

                    retired                                 direct care

 

                    Single                                   

 

                    Exercises regularly                         

 

                    Attended some college                         

 

                    Tobacco             Never smoker         







History of Procedures







                    Date Ordered        Description         Order Status

 

                    2010 12:00 AM    COMPREHEN METABOLIC PANEL    Reviewed

 

                    2010 12:00 AM    COMPLETE CBC W/AUTO DIFF WBC    Reviewed

 

                    2010 12:00 AM    LIPID PANEL         Reviewed

 

                          2015 12:00 AM        B12 Injection, Up to 1000 Mcg NDC#4223-3722-61 C Medicare 

                                        Reviewed

 

                    2011 12:00 AM    MAMMOGRAM SCREENING    Reviewed

 

                    2011 12:00 AM    CYTOPATH C/V THIN LAYER    Reviewed

 

                    2011 12:00 AM    B12 Injection 1 cc NDC#[address]-77    Reviewed

 

                    2015 12:00 AM    THER/PROPH/DIAG INJ SC/IM    Reviewed

 

                    2015 12:00 AM    B12 Injection, Up to 1000 Mcg NDC#7228-0760-37    Reviewed

 

                    2011 12:00 AM    THER/PROPH/DIAG INJ SC/IM    Reviewed

 

                    2011 12:00 AM    B12 Injection([facility]) Ndc#5550-2043-40-    Reviewed

 

                    2015 12:00 AM    THER/PROPH/DIAG INJ SC/IM    Reviewed

 

                    2015 12:00 AM    B12 Injection, Up to 1000 Mcg NDC#6157-0273-68    Reviewed

 

                    10/20/2011 12:00 AM    THER/PROPH/DIAG INJ SC/IM    Reviewed

 

                    10/20/2011 12:00 AM    B12 Injection([facility]) Ndc#7838-0363-18-    Reviewed

 

                    2016 12:00 AM    THER/PROPH/DIAG INJ SC/IM    Reviewed

 

                    2016 12:00 AM    B12 Injection, Up to 1000 Mcg NDC#4530-3448-08    Reviewed

 

                    3/14/2016 12:00 AM    VITAMIN B-12        Reviewed

 

                    3/15/2016 12:00 AM    THER/PROPH/DIAG INJ SC/IM    Reviewed

 

                    3/15/2016 12:00 AM    B12 Injection, Up to 1000 Mcg NDC#1559-7612-82    Reviewed

 

                    2011 12:00 AM    ***Immunization administration, Medicare flu    Reviewed

 

                    2011 12:00 AM    Fluzone ** MEDICARE Only **    Reviewed

 

                    2011 12:00 AM    THER/PROPH/DIAG INJ SC/IM    Reviewed

 

                    2011 12:00 AM    B12 Injection ([facility]) Ndc#5031-3066-30    Reviewed

 

                    2016 12:00 AM    B12 Injection, Up to 1000 Mcg NDC#9478-6916-56 [facility] Medicare    

Reviewed

 

                    2016 12:00 AM    TTE W/DOPPLER COMPLETE    Reviewed

 

                    2016 12:00 AM    EXTREMITY STUDY     Returned

 

                          2016 12:00 AM        B12 Injection, Up to 1000 Mcg NDC#1875-3925-03 [facility] 

                                        Reviewed

 

                    2016 12:00 AM    THER/PROPH/DIAG INJ SC/IM    Reviewed

 

                    2016 12:00 AM    THER/PROPH/DIAG INJ SC/IM    Reviewed

 

                    2016 12:00 AM    B12 Injection, Up to 1000 Mcg NDC#4579-7192-32    Reviewed

 

                    2012 12:00 AM    THER/PROPH/DIAG INJ SC/IM    Reviewed

 

                    2012 12:00 AM    B12 Injection ([employer] [facility]) Ndc#4944-2648-26    Reviewed

 

                    2012 12:00 AM    THER/PROPH/DIAG INJ SC/IM    Reviewed

 

                    2012 12:00 AM    B12 Injection([facility]) Ndc#9376-0926-03-    Reviewed

 

                    5/3/2012 12:00 AM    THER/PROPH/DIAG INJ SC/IM    Reviewed

 

                    5/3/2012 12:00 AM    B12 Injection([facility]) Ndc#9668-8662-14-    Reviewed

 

                    2012 12:00 AM    IMMUNOTHERAPY INJECTIONS    Reviewed

 

                    2012 12:00 AM    B12 Injection([facility]) Ndc#1037-8551-68-    Reviewed

 

                    2012 12:00 AM    THER/PROPH/DIAG INJ SC/IM    Reviewed

 

                    2012 12:00 AM    B12 Injection, Up to 1000 Mcg NDC#9725-9643-77    Reviewed

 

                    2012 12:00 AM    THER/PROPH/DIAG INJ SC/IM    Reviewed

 

                    2012 12:00 AM    B12 Injection, Up to 1000 Mcg NDC#8746-2448-45    Reviewed

 

                    2012 12:00 AM    THER/PROPH/DIAG INJ SC/IM    Reviewed

 

                    2012 12:00 AM    B12 Injection, Up to 1000 Mcg NDC#0886-4036-15    Reviewed

 

                    10/16/2012 12:00 AM    THER/PROPH/DIAG INJ SC/IM    Reviewed

 

                    10/16/2012 12:00 AM    B12 Injection, Up to 1000 Mcg NDC#4732-0978-78    Reviewed

 

                    2010 12:00 AM    COMPREHEN METABOLIC PANEL    Reviewed

 

                    2010 12:00 AM    COMPLETE CBC W/AUTO DIFF WBC    Reviewed

 

                    2010 12:00 AM    LIPID PANEL         Reviewed

 

                    2013 12:00 AM    Flu Injection 3 Years And Above NDC# [address]-14  [facility]    Reviewed



 

                    2013 12:00 AM    COMPLETE CBC W/AUTO DIFF WBC    Reviewed

 

                    2013 12:00 AM    ASSAY OF LITHIUM    Reviewed

 

                    2013 12:00 AM    METABOLIC PANEL TOTAL CA    Reviewed

 

                    4/3/2013 12:00 AM    THER/PROPH/DIAG INJ SC/IM    Reviewed

 

                    4/3/2013 12:00 AM    B12 Injection, Up to 1000 Mcg NDC#4073-4291-41    Reviewed

 

                    2013 12:00 AM    THER/PROPH/DIAG INJ SC/IM    Reviewed

 

                    2013 12:00 AM    B12 Injection, Up to 1000 Mcg NDC#2710-0630-94    Reviewed

 

                    2013 12:00 AM    THER/PROPH/DIAG INJ SC/IM    Reviewed

 

                    2013 12:00 AM    B12 Injection, Up to 1000 Mcg NDC#8632-5324-90    Reviewed

 

                    2013 12:00 AM    LIPID PANEL         Reviewed

 

                    2013 12:00 AM    VITAMIN D 25 HYDROXY    Reviewed

 

                    2013 12:00 AM    THER/PROPH/DIAG INJ SC/IM    Reviewed

 

                    3/6/2014 12:00 AM    THER/PROPH/DIAG INJ SC/IM    Reviewed

 

                    2014 12:00 AM    THER/PROPH/DIAG INJ SC/IM    Reviewed

 

                    2014 12:00 AM    B12 Injection, Up to 1000 Mcg NDC#6719-0443-87    Reviewed

 

                    2010 12:00 AM    SKIN FUNGI CULTURE    Reviewed

 

                    10/9/2010 12:00 AM    COMPREHEN METABOLIC PANEL    Reviewed

 

                    10/9/2010 12:00 AM    LIPID PANEL         Reviewed

 

                    2010 12:00 AM    THER/PROPH/DIAG INJ SC/IM    Reviewed

 

                    2010 12:00 AM    B12 Injection Ndc#[address]-22 ([facility])    Reviewed

 

                    2010 12:00 AM    THER/PROPH/DIAG INJ SC/IM    Reviewed

 

                    2010 12:00 AM    Kenalog 40 Mg Im-Ndc#[address]-05 ([facility])    Reviewed

 

                    10/15/2010 12:00 AM    FLU VACCINE 3 YRS & > IM    Reviewed

 

                    10/15/2010 12:00 AM    Admin.Of M/C Cov.Vaccine-Flu Vacc.    Reviewed

 

                    1/15/2011 12:00 AM    COMPLETE CBC W/AUTO DIFF WBC    Reviewed

 

                    1/15/2011 12:00 AM    COMPREHEN METABOLIC PANEL    Reviewed

 

                    1/15/2011 12:00 AM    LIPID PANEL         Reviewed

 

                    2014 12:00 AM    MAMMOGRAM SCREENING    Reviewed

 

                    2014 12:00 AM    Screening mammography, bilateral    Reviewed

 

                    7/10/2014 12:00 AM    THER/PROPH/DIAG INJ SC/IM    Reviewed

 

                    7/10/2014 12:00 AM    B12 Injection, Up to 1000 Mcg NDC#5675-7218-31    Reviewed

 

                    2011 12:00 AM    COMPLETE CBC W/AUTO DIFF WBC    Reviewed

 

                    2011 12:00 AM    COMPREHEN METABOLIC PANEL    Reviewed

 

                    2011 12:00 AM    LIPID PANEL         Reviewed

 

                    2014 12:00 AM    B12 Injection, Up to 1000 Mcg NDC#3634-4249-28    Reviewed

 

                    10/19/2014 12:00 AM    MAMMOGRAM SCREENING    Reviewed

 

                    10/19/2014 12:00 AM    Screening mammography, bilateral    Reviewed

 

                    10/16/2014 12:00 AM    COMPLETE CBC W/AUTO DIFF WBC    Reviewed

 

                    10/16/2014 12:00 AM    COMPREHEN METABOLIC PANEL    Reviewed

 

                    10/16/2014 12:00 AM    IMMUNOASSAY TUMOR     Reviewed

 

                    10/16/2014 12:00 AM    LIPID PANEL         Reviewed

 

                    10/16/2014 12:00 AM    ASSAY OF LITHIUM    Reviewed

 

                    10/16/2014 12:00 AM    MAMMOGRAM SCREENING    Reviewed

 

                    2011 12:00 AM    ASSAY OF PARATHORMONE    Reviewed

 

                    2011 12:00 AM    VITAMIN D 25 HYDROXY    Reviewed

 

                    2011 12:00 AM    ASSAY OF LITHIUM    Reviewed

 

                    2011 12:00 AM    METABOLIC PANEL TOTAL CA    Reviewed

 

                    2011 12:00 AM    CT HEAD/BRAIN W/O & W/DYE    Reviewed

 

                    3/23/2015 12:00 AM    PNEUMOCOCCAL VACC 13 GLENDY IM    Reviewed

 

                    3/23/2015 12:00 AM    Vitamin B12 injection    Reviewed

 

                    2011 12:00 AM    ASSAY OF LITHIUM    Reviewed

 

                    2011 12:00 AM    B12 Injection Ndc#[address]-96  [facility]    Reviewed

 

                    2015 12:00 AM    THER/PROPH/DIAG INJ SC/IM    Reviewed

 

                    2015 12:00 AM    B12 Injection, Up to 1000 Mcg NDC#7660-4219-30    Reviewed

 

                    2015 12:00 AM    COMPLETE CBC W/AUTO DIFF WBC    Reviewed

 

                    2015 12:00 AM    COMPREHEN METABOLIC PANEL    Reviewed

 

                    2015 12:00 AM    LIPID PANEL         Reviewed

 

                    2015 12:00 AM    ASSAY OF LITHIUM    Reviewed

 

                    2011 12:00 AM    VIT D 1 25-DIHYDROXY    Reviewed

 

                    2011 12:00 AM    VITAMIN B-12        Reviewed

 

                    2015 12:00 AM    B12 Injection, Up to 1000 Mcg NDC#7306-6012-62    Reviewed

 

                    2015 12:00 AM    THER/PROPH/DIAG INJ SC/IM    Reviewed

 

                    2015 12:00 AM    B12 Injection, Up to 1000 Mcg NDC#5106-0158-03    Reviewed

 

                    2011 12:00 AM    THER/PROPH/DIAG INJ SC/IM    Reviewed

 

                    2011 12:00 AM    B12 Injection ([facility]) Ndc#5002-7684-60    Reviewed

 

                    2015 12:00 AM    THER/PROPH/DIAG INJ SC/IM    Reviewed

 

                    2015 12:00 AM    B12 Injection, Up to 1000 Mcg NDC#0696-9057-56    Reviewed







Results Summary







                          Data and Description      Results

 

                          2004 12:00 AM        Colonoscopy-Women and Men over 50 Normal 

 

                          2008 12:00 AM         Pap Smear Declined 

 

                          10/7/2009 12:00 AM        Cholest Cry Stone Ql .0 %LDLc SerPl-mCnc 123.0 mg/dLHDLc

 SerPl-mCnc 34.0 mg/dLTrigl SerPl-mCnc 190.0 mg/dLGlucose SerPl-mCnc 78.0 mg/dL

 

                          2009 12:00 AM        Mammogram -Women over 40 Normal HIV1+2 Ab Ser Ql no risk 

 

                          2010 8:47 AM         Dexa Bone Scan Refused Aspirin reccommended Contraindication 



 

                          2010 8:48 AM         Depression Done 

 

                          2010 12:00 AM         Foot Exam-Diabetic Done 

 

                          2010 12:00 AM         Cholest Cry Stone Ql .0 %LDLc SerPl-mCnc 126.0 mg/dLGlucose

 SerPl-mCnc 102.0 mg/dL

 

                          2010 8:45 AM          TRIGLYCERIDES 122.0 mg/dLCHOLESTEROL 186.0 mg/dLHDL 36.0 mg/dLLDL

 (CALC) 126.0 mg/dLGLUCOSE 102.0 mg/dLSODIUM 143.0 mmol/LPOTASSIUM 3.70 
mmol/LCHLORIDE 111.0 mmol/LCO2 23.0 mmol/LBUN 10.0 mg/dLCREATININE 0.80 
mg/dLSGOT/AST 12.0 IU/LSGPT/ALT 11.0 IU/LALK PHOS 65.0 IU/LTOTAL PROTEIN 7.20 
g/dLALBUMIN 3.90 g/dLTOTAL BILI 0.50 mg/dLCALCIUM 10.20 mg/dLeGFR >60 
mL/min/1.73 m2WBC 5.7 RBC 3.26 HGB 10.60 g/dLHCT 31.70 %MCV 97.0 fLMCH 32.50 
pgMCHC 33.40 g/dLRDW CV 13.30 %MPV 9.70 fLPLT 287 %NEUT 62.90 %%LYMP 21.80 
%%MONO 9.90 %%EOS 5.0 %%BASO 0.40 %#NEUT 3.56 #LYMP 1.23 #MONO 0.56 #EOS 0.28 
#BASO 0.02 

 

                          2010 12:00 AM        Glucose SerPl-mCnc 96.0 mg/dLCholest Cry Stone Ql .0 %LDLc

 SerPl-mCnc 146.0 mg/dL

 

                          2010 8:26 AM         TRIGLYCERIDES 106.0 mg/dLCHOLESTEROL 199.0 mg/dLHDL 32.0 mg/dLLDL

 (CALC) 146.0 mg/dLGLUCOSE 96.0 mg/dLSODIUM 143.0 mmol/LPOTASSIUM 4.0 
mmol/LCHLORIDE 113.0 mmol/LCO2 24.0 mmol/LBUN 13.0 mg/dLCREATININE 1.0 
mg/dLSGOT/AST 11.0 IU/LSGPT/ALT 6.0 IU/LALK PHOS 56.0 IU/LTOTAL PROTEIN 6.60 
g/dLALBUMIN 3.80 g/dLTOTAL BILI 0.50 mg/dLCALCIUM 9.30 mg/dLeGFR 57 

 

                          10/6/2010 12:00 AM        Cholest Cry Stone Ql .0 %LDLc SerPl-mCnc 111.0 mg/dLGlucose

 SerPl-mCnc 81.0 mg/dL

 

                          10/6/2010 2:45 PM         TRIGLYCERIDES 123.0 mg/dLCHOLESTEROL 178.0 mg/dLHDL 42.0 mg/dLLDL

 (CALC) 111.0 mg/dLGLUCOSE 81.0 mg/dLSODIUM 139.0 mmol/LPOTASSIUM 4.10 
mmol/LCHLORIDE 106.0 mmol/LCO2 24.0 mmol/LBUN 13.0 mg/dLCREATININE 0.90 
mg/dLSGOT/AST 13.0 IU/LSGPT/ALT 11.0 IU/LALK PHOS 61.0 IU/LTOTAL PROTEIN 7.10 
g/dLALBUMIN 3.90 g/dLTOTAL BILI 0.30 mg/dLCALCIUM 9.30 mg/dLeGFR >60 mL/min/1.73
 m2WBC 6.9 RBC 3.59 HGB 11.50 g/dLHCT 35.30 %MCV 98.0 fLMCH 32.0 pgMCHC 32.60 
g/dLRDW CV 12.90 %MPV 9.90 fLPLT 311 %NEUT 64.90 %%LYMP 22.50 %%MONO 7.20 %%EOS 
5.10 %%BASO 0.30 %#NEUT 4.45 #LYMP 1.54 #MONO 0.49 #EOS 0.35 #BASO 0.02 

 

                          2011 12:00 AM         Mammogram -Women over 40 Ordered 

 

                          2011 10:25 AM        TRIGLYCERIDES 111.0 mg/dLCHOLESTEROL 195.0 mg/dLHDL 43.0 mg/dLLDL

 (CALC) 130.0 mg/dLWBC 5.3 RBC 3.76 HGB 12.0 g/dLHCT 37.80 %.0 fLMCH 
31.90 pgMCHC 31.70 g/dLRDW CV 13.0 %MPV 9.70 fLPLT 259 %NEUT 69.0 %%LYMP 17.60 
%%MONO 8.30 %%EOS 4.70 %%BASO 0.40 %#NEUT 3.63 #LYMP 0.93 #MONO 0.44 #EOS 0.25 
#BASO 0.02 GLUCOSE 102.0 mg/dLSODIUM 146.0 mmol/LPOTASSIUM 4.20 mmol/LCHLORIDE 
113.0 mmol/LCO2 23.0 mmol/LBUN 15.0 mg/dLCREATININE 1.0 mg/dLSGOT/AST 12.0 
IU/LSGPT/ALT 17.0 IU/LALK PHOS 60.0 IU/LTOTAL PROTEIN 6.90 g/dLALBUMIN 4.20 
g/dLTOTAL BILI 0.40 mg/dLCALCIUM 9.70 mg/dLeGFR 57 

 

                          2011 11:49 AM        Cholest Cry Stone Ql .0 %LDLc SerPl-mCnc 130.0 mg/dLHDLc

 SerPl-mCnc 43.0 mg/dLTrigl SerPl-mCnc 111.0 mg/dLGlucose SerPl-mCnc 102.0 mg/dL

 

                          2011 11:52 AM        Pap Smear Declined 

 

                          2011 11:28 AM        Lithium 2.080 mmol/LGLUCOSE 102.0 mg/dLSODIUM 135.0 mmol/LPOTASSIUM

 3.90 mmol/LCHLORIDE 106.0 mmol/LCO2 21.0 mmol/LBUN 12.0 mg/dLCREATININE 1.30 
mg/dLCALCIUM 10.70 mg/dLeGFR 42 

 

                          2011 8:58 AM          Lithium 0.690 mmol/L

 

                          2011 2:38 PM         VITAMIN B12 3483.0 pg/mL

 

                          2013 3:35 PM          WBC 5.1 RBC 3.73 HGB 11.70 g/dLHCT 36.40 %MCV 98.0 fLMCH 31.40

 pgMCHC 32.10 g/dLRDW CV 13.10 %MPV 9.80 fLPLT 224 %NEUT 66.80 %%LYMP 19.10 
%%MONO 9.0 %%EOS 4.90 %%BASO 0.20 %#NEUT 3.42 #LYMP 0.98 #MONO 0.46 #EOS 0.25 
#BASO 0.01 GLUCOSE 88.0 mg/dLSODIUM 141.0 mmol/LPOTASSIUM 4.10 mmol/LCHLORIDE 
110.0 mmol/LCO2 22.0 mmol/LBUN 22.0 mg/dLCREATININE 1.10 mg/dLCALCIUM 9.80 
mg/dLeGFR 50 Lithium 0.760 mmol/L

 

                          2013 11:02 AM        TRIGLYCERIDES 106.0 mg/dLCHOLESTEROL 181.0 mg/dLHDL 46.0 mg/dLLDL

 (CALC) 114.0 mg/dLVITAMIN D 41.10 ng/mL

 

                          10/17/2014 10:10 AM       WBC 5.0 RBC 3.66 HGB 11.60 g/dLHCT 36.80 %.0 fLMCH 31.70

 pgMCHC 31.50 g/dLRDW CV 13.50 %MPV 10.10 fLPLT 209 %NEUT 69.20 %%LYMP 21.0 
%%MONO 6.40 %%EOS 3.20 %%BASO 0.20 %#NEUT 3.46 #LYMP 1.05 #MONO 0.32 #EOS 0.16 
#BASO 0.01 GLUCOSE 100.0 mg/dLSODIUM 148.0 mmol/LPOTASSIUM 3.90 mmol/LCHLORIDE 
114.0 mmol/LCO2 26.0 mmol/LBUN 12.0 mg/dLCREATININE 1.20 mg/dLSGOT/AST 9.0 
IU/LSGPT/ALT <6 IU/LALK PHOS 82.0 IU/LTOTAL PROTEIN 6.90 g/dLALBUMIN 4.0 
g/dLTOTAL BILI 0.40 mg/dLCALCIUM 10.50 mg/dLeGFR 45 TRIGLYCERIDES 96.0 
mg/dLCHOLESTEROL 155.0 mg/dLHDL 38.0 mg/dLLDL (CALC) 98.0 mg/dLLithium 0.850 
mmol/LCancer Antigen (CA) 125 8.30 U/mL

 

                          2015 10:25 AM        Lithium 0.790 mmol/LWBC 4.8 RBC 3.44 HGB 11.0 g/dLHCT 35.20 

%.0 fLMCH 32.0 pgMCHC 31.30 g/dLRDW CV 14.0 %MPV 9.30 fLPLT 210 %NEUT 
70.80 %%LYMP 17.20 %%MONO 8.10 %%EOS 3.50 %%BASO 0.40 %#NEUT 3.41 #LYMP 0.83 
#MONO 0.39 #EOS 0.17 #BASO 0.02 TRIGLYCERIDES 107.0 mg/dLCHOLESTEROL 174.0 
mg/dLHDL 43.0 mg/dLLDL (CALC) 110.0 mg/dLGLUCOSE 90.0 mg/dLSODIUM 145.0 
mmol/LPOTASSIUM 3.80 mmol/LCHLORIDE 115.0 mmol/LCO2 24.0 mmol/LBUN 17.0 mg
/dLCREATININE 1.30 mg/dLSGOT/AST 18.0 IU/LSGPT/ALT 17.0 IU/LALK PHOS 56.0 
IU/LTOTAL PROTEIN 6.70 g/dLALBUMIN 3.90 g/dLTOTAL BILI 0.40 mg/dLCALCIUM 9.80 
mg/dLeGFR 41 

 

                          2015 8:50 AM        WBC 5.8 RBC 3.29 HGB 10.70 g/dLHCT 34.0 %.0 fLH 32.50

 pgMCHC 31.50 g/dLRDW CV 13.60 %MPV 9.60 fLPLT 223 %NEUT 69.60 %%LYMP 18.90 
%%MONO 8.50 %%EOS 2.80 %%BASO 0.20 %#NEUT 4.03 #LYMP 1.09 #MONO 0.49 #EOS 0.16 
#BASO 0.01 Lithium 0.620 mmol/LGLUCOSE 83.0 mg/dLSODIUM 139.0 mmol/LPOTASSIUM 
3.90 mmol/LCHLORIDE 109.0 mmol/LCO2 22.0 mmol/LBUN 19.0 mg/dLCREATININE 1.40 
mg/dLSGOT/AST 19.0 IU/LSGPT/ALT 21.0 IU/LALK PHOS 55.0 IU/LTOTAL PROTEIN 6.50 
g/dLALBUMIN 3.90 g/dLTOTAL BILI 0.50 mg/dLCALCIUM 9.60 mg/dLeGFR 38 
TRIGLYCERIDES 121.0 mg/dLCHOLESTEROL 192.0 mg/dLHDL 51.0 mg/dLLDL 121.0 mg/dLTSH
 1.210 uIU/mL

 

                          3/15/2016 8:08 AM         VITAMIN B12 696.0 pg/mL

 

                          3/23/2016 8:26 AM         WBC 7.0 RBC 3.61 HGB 11.80 g/dLHCT 37.70 %.0 [facility].70

 pgMCHC 31.30 g/dLRDW CV 12.50 %MPV 10.0 fLPLT 207 %NEUT 73.60 %%LYMP 16.40 
%%MONO 6.60 %%EOS 3.0 %%BASO 0.30 %#NEUT 5.15 #LYMP 1.15 #MONO 0.46 #EOS 0.21 
#BASO 0.02 Lithium 0.940 mmol/LGLUCOSE 108.0 mg/dLSODIUM 143.0 mmol/LPOTASSIUM 
4.30 mmol/LCHLORIDE 110.0 mmol/LCO2 27.0 mmol/LBUN 16.0 mg/dLCREATININE 1.60 
mg/dLSGOT/AST 13.0 IU/LSGPT/ALT 7.0 IU/LALK PHOS 71.0 IU/LTOTAL PROTEIN 6.80 
g/dLALBUMIN 4.0 g/dLTOTAL BILI 0.20 mg/dLCALCIUM 10.40 mg/dLeGFR 32 
TRIGLYCERIDES 113.0 mg/dLCHOLESTEROL 169.0 mg/dLHDL 42.0 mg/dLLDL (CALC) 104.0 
mg/dLTSH 2.20 uIU/mL

 

                          3/25/2016 9:17 AM         COLOR YELLOW APPEARANCE CLEAR SPEC GRAV 1.010 pH 7.0 PROTEIN 

NEGATIVE GLUCOSE NEGATIVE mg/dLKETONE NEGATIVE BILIRUBIN NEGATIVE BLOOD NEGATIVE
 NITRITE NEGATIVE LEUK SCREEN SMALL CASTS/LPF NEGATIVE /LPFCRYSTALS NEGATIVE 
MUCOUS THRDS NEGATIVE BACTERIA NEGATIVE EPITH CELLS FEW SQUAMOUS /HPFTRICHOMONAS
 NEGATIVE YEAST NEGATIVE 







History Of Immunizations







       Name    Date Admin    Mfg Name    Mfg Code    Trade Name    Lot#    Route    Inj    Vis Given    Vis

 Pub                                    CVX

 

        Influenza    2008    Not Entered    NE      Not Entered            Not Entered    Not Entered

                    1            999

 

        X       12/19/2008    [employer]    MSD     Pneumovax 23            Intramuscular    Not Entered

                    1            999

 

           Influenza    10/15/2010    [employer].    NOV        Fluvirin > 12 Years    

[id number]     Intramuscular    Left Deltoid    10/15/2010    2009    999

 

          X         3/23/2015    [facility]yerst-[facility]ederleBrijesh    [facility]       Prevnar 13    [id number]    Intramuscular

                Right Gluteous Medius    3/23/2015       2013       109







History of Past Illness







                    Name                Date of Onset       Comments

 

                    Peritoneal Neoplasm, Malignant                         

 

                    Hyperlipidemia                           

 

                    Bipolar disorder, unspecified                         

 

                    Artificial opening status; colostomy                         

 

                    B12 deficiency                           

 

                    Anemia, Pernicious                         

 

                    Arthritis unspecified                         

 

                    cervical cancer                          

 

                    Artificial opening status; colostomy    2010  1:10PM     

 

                    Bipolar disorder, unspecified    2010  1:10PM     

 

                    Hyperlipidemia      2010  1:10PM     

 

                    Anemia, Pernicious    2010  1:10PM     

 

                    Postoperative Follow-Up    2010  1:55PM     

 

                    Postoperative Follow-Up    Mar  8 2010 10:57AM     

 

                    Artificial opening status; colostomy    Mar  8 2010  1:19PM     

 

                    Peritoneal Neoplasm, Malignant    Mar  8 2010  1:19PM     

 

                    Artificial opening status; colostomy    2010  1:40PM     

 

                    Hyperlipidemia      2010  1:40PM     

 

                    Anemia, Pernicious    2010  1:40PM     

 

                    Peritoneal Neoplasm, Malignant    2010  1:40PM     

 

                    Arthritis unspecified    2010  1:40PM     

 

                    Anemia of Chronic Illness    2010  1:40PM     

 

                    Tinea corporis      2010  3:17PM     

 

                    Bipolar disorder, unspecified    2010  1:33PM     

 

                    Hyperlipidemia      2010  1:33PM     

 

                    Anemia, Pernicious    2010  1:33PM     

 

                    Peritoneal Neoplasm, Malignant    2010  1:33PM     

 

                    B12 deficiency      2010  1:33PM     

 

                    Ethmoidal Sinusitis, Acute    Sep 21 2010  3:53PM     

 

                    Wheezing            Sep 21 2010  3:53PM     

 

                    Flu                 Oct 15 2010  1:40PM     

 

                    Bipolar disorder, unspecified    Oct 15 2010  1:42PM     

 

                    Hyperlipidemia      Oct 15 2010  1:42PM     

 

                    Anemia, Pernicious    Oct 15 2010  1:42PM     

 

                    Peritoneal Neoplasm, Malignant    Oct 15 2010  1:42PM     

 

                    Bipolar disorder, unspecified    2011 12:01PM     

 

                    Hyperlipidemia      2011 12:01PM     

 

                    Anemia, Pernicious    2011 12:01PM     

 

                    Peritoneal Neoplasm, Malignant    2011 12:01PM     

 

                    Bipolar disorder, unspecified    Apr 15 2011 10:55AM     

 

                    Major Depression    2011 10:11AM     

 

                    Bipolar Disorder    2011 10:11AM     

 

                    Cancer              May 10 2011  4:16PM     

 

                    Major Depression    May 10 2011  3:16PM     

 

                    Bipolar Disorder    May 10 2011  3:16PM     

 

                    Hypercalcemia       May 23 2011  2:47PM     

 

                    Bipolar disorder, unspecified    May 23 2011  2:47PM     

 

                    Colon Cancer, Personal History    May 23 2011  2:47PM     

 

                    Bipolar Disorder    May 31 2011  4:39PM     

 

                    Depressive Disorder    2011 10:01AM     

 

                    Vitamin B12 deficiency    2011 10:01AM     

 

                    Vitamin D Deficiency    2011  5:07PM     

 

                    Anemia, Vitamin B12 Deficiency    2011  5:07PM     

 

                    B12 deficiency      2011  3:56PM     

 

                    Routine gynecological examination    Aug  4 2011  9:08AM     

 

                    Screening Examination for Breast Cancer    Aug  4 2011  9:08AM     

 

                    Tinea Corporis      Aug  4 2011  9:08AM     

 

                    Depressive Disorder    Sep 23 2011  8:47AM     

 

                    Contact Dermatitis    Sep 23 2011  8:47AM     

 

                    Anemia, Pernicious    Sep 23 2011  8:47AM     

 

                    B12 deficiency      Sep 23 2011  8:47AM     

 

                    B12 deficiency      Sep 27 2011  2:58PM     

 

                    B12 deficiency      Oct 20 2011  2:34PM     

 

                    Flu                 Dec  9 2011  3:16PM     

 

                    B12 deficiency      Dec  9 2011  3:17PM     

 

                    B12 deficiency      2012  4:52PM     

 

                    B12 deficiency      2012 11:10AM     

 

                    B12 deficiency      2012  3:37PM     

 

                    B12 deficiency      May  3 2012  4:10PM     

 

                    B12 deficiency      2012  2:54PM     

 

                    B12 deficiency      2012 11:23AM     

 

                    B12 deficiency      Aug  9 2012  2:08PM     

 

                    B12 deficiency      Sep  6 2012  4:36PM     

 

                    B12 deficiency      Oct 16 2012 10:23AM     

 

                    Flu                 Feb  2013  3:11PM     

 

                    Bipolar disorder, unspecified    b  2013  2:48PM     

 

                    Anemia, Pernicious    Feb  2013  2:48PM     

 

                    B12 deficiency      b  2013  2:48PM     

 

                    Extrapyramidal abnormal movement disorder    Feb  2013  2:48PM     

 

                    B12 deficiency      Apr  3 2013 12:03PM     

 

                    Bipolar disorder, unspecified    May  7 2013  1:31PM     

 

                    Anemia, Pernicious    May  7 2013  1:31PM     

 

                    B12 deficiency      May  7 2013  1:31PM     

 

                    Extrapyramidal abnormal movement disorder    May  7 2013  1:31PM     

 

                    B12 deficiency      2013  3:42PM     

 

                    B12 deficiency      2013  1:31PM     

 

                    Hyperlipidemia      Aug  7 2013 10:37AM     

 

                    Vitamin D Deficiency    Aug  7 2013 10:37AM     

 

                    Bipolar disorder, unspecified    Aug  7 2013 10:37AM     

 

                    Anemia, Pernicious    Aug  7 2013 10:37AM     

 

                    B12 deficiency      Aug  7 2013 10:37AM     

 

                    B12 deficiency      Sep 25 2013 11:15AM     

 

                    B12 deficiency      Dec 11 2013  3:16PM     

 

                    B12 deficiency      Mar  6 2014  1:48PM     

 

                    B12 deficiency      May 21 2014  3:17PM     

 

                    Screening Examination for Breast Cancer    2014  3:23PM     

 

                    Periumbilical abdominal pain    2014  3:23PM     

 

                    B12 deficiency      Jul 10 2014  2:52PM     

 

                    Anemia, Vitamin B12 Deficiency    Aug 13 2014  4:50PM     

 

                    Bipolar disorder    Oct 16 2014 11:13AM     

 

                    Hyperlipidemia      Oct 16 2014 11:13AM     

 

                    Anemia, Pernicious    Oct 16 2014 11:13AM     

 

                    Peritoneal Neoplasm, Malignant    Oct 16 2014 11:13AM     

 

                    Screening breast examination    Oct 16 2014 11:13AM     

 

                    Weight loss         Oct 16 2014 11:13AM     

 

                    Anemia, Pernicious    Mar 23 2015  2:57PM     

 

                    B12 deficiency      Mar 23 2015  2:57PM     

 

                    Need for Prevnar vaccine    Mar 23 2015  2:57PM     

 

                    Bipolar disorder    Mar 23 2015  2:57PM     

 

                    Hyperlipidemia      Mar 23 2015  2:57PM     

 

                    Anemia, Pernicious    Mar 23 2015  2:57PM     

 

                    Peritoneal Neoplasm, Malignant    Mar 23 2015  2:57PM     

 

                    B12 deficiency      May  4 2015  4:48PM     

 

                    Hyperlipidemia      May 13 2015  9:56AM     

 

                    Anemia              May 13 2015  9:56AM     

 

                    Bipolar disorder    May 13 2015  9:56AM     

 

                    Bipolar disorder    May 14 2015  3:27PM     

 

                    Hyperlipidemia      May 14 2015  3:27PM     

 

                    Anemia, Pernicious    May 14 2015  3:27PM     

 

                    Peritoneal Neoplasm, Malignant    May 14 2015  3:27PM     

 

                    B12 deficiency      2015  2:20PM     

 

                    B12 deficiency      2015 11:34AM     

 

                    B12 deficiency      Aug 18 2015  9:06AM     

 

                    Tinea Corporis      Sep 18 2015  8:54AM     

 

                    B12 deficiency      Sep 18 2015  8:54AM     

 

                    B12 deficiency      2015 10:28AM     

 

                    Herpes zoster without complication    Dec  3 2015  9:52AM     

 

                    B12 deficiency      Dec 23 2015 11:21AM     

 

                    B12 deficiency      2016  4:51PM     

 

                    Vitamin B 12 deficiency    Mar 14 2016  5:35PM     

 

                    B12 deficiency      Mar 15 2016 12:14PM     

 

                    B12 deficiency      May  5 2016 11:30AM     

 

                    Edema               May  5 2016 11:30AM     

 

                    Dermatitis          May  5 2016 11:30AM     

 

                    Edema               May 17 2016  8:38AM     

 

                    Shortness of breath    May 17 2016  8:38AM     

 

                    Bilateral edema of lower extremity    2016  2:06PM     

 

                    B12 deficiency      2016  2:06PM     

 

                    B12 deficiency      2016 11:50AM     

 

                    B12 deficiency      2016 11:20AM     

 

                    Diarrhea            Aug  2 2016  3:13PM     

 

                    B12 deficiency      Aug 24 2016 11:10AM     

 

                    Encounter for screening mammogram for breast cancer    Aug 24 2016 11:44AM     

 

                    B12 deficiency      Sep 28 2016  2:35PM     







Payers







           Insurance Name    Company Name    Plan Name    Plan Number    Policy Number    Policy Group

 Number                                 Start Date

 

                    Medicare Part A    Medicare RHC              [id number]              N/A

 

                          [employer]    [employer]                 [id number]

                                                    

 

                    Medicare Part A    Medicare - Lab/Xray              [id number]              2006

 

                    Medicare Part B    Medicare [place]              [id number]              2006

 

                          [employer]    [employer]                 50 percent

                                                    2009

 

                    [facility]    [employer]              [id number]              N/A

 

                    Medicare Part A    Medicare Part A              [id number]              N/A

 

                    Medicare Part A    Medicare Part A              [id number]              2006









History of Encounters







                    Visit Date          Visit Type          Provider

 

                    2016           Nurse visit         Bret GREEN

 

                    2016           Nurse visit         Bhupinder Louise DO

 

                    2016            Office visit        Bhupinder Louise DO

 

                    2016           Nurse visit         Bhupinder Aspen DO

 

                    2016           Office visit        Bret Spiveyran APRN

 

                    2016            Office visit        Bhupinder Aspen DO

 

                    3/15/2016           Nurse visit         Bhupinder Aspen DO

 

                    2016            Nurse visit         Bhupinder Aspen DO

 

                    2015          Nurse visit         Bhupinder Aspen DO

 

                    12/3/2015           Office visit        Bhupinder Aspen DO

 

                    2015          Nurse visit         Bhupinder Aspen DO

 

                    2015           Office visit        Bhupinder Aspen DO

 

                    2015           Nurse visit         Bhupinder Aspen DO

 

                    2015            Nurse visit         Bhupinder Aspen DO

 

                    2015            Nurse visit         Bhupinder Aspen DO

 

                    2015           Office visit        Bhupinder Aspen DO

 

                    2015            Nurse visit         Bhupinder Aspen DO

 

                    3/23/2015           Office visit        Bhupinder Aspen DO

 

                    10/16/2014          Office visit        Bhupinder Aspen DO

 

                    2014           Nurse visit         Radha Ontiveros APRN

 

                    7/10/2014           Nurse visit         Bhupinder Aspen DO

 

                    2014           Office visit        Bhupinder Aspen DO

 

                    2014           Nurse visit         Bhupinder Aspen DO

 

                    3/6/2014            Nurse visit         Bhupinder Aspen DO

 

                    2014            Yasmine Lopez MD

 

                    2013          Nurse visit         Bhupinder Aspen DO

 

                    2013           Nurse visit         Bhupinder Aspen DO

 

                    2013            Office visit        Bhupinder Aspen DO

 

                    2013            Nurse visit         Bhupinder Aspen DO

 

                    2013            Nurse visit         Bhupinder Aspen DO

 

                    2013            Office visit        Bhupinder Aspen DO

 

                    4/3/2013            Nurse visit         Bhupinder Aspen DO

 

                    2013            Office visit        Bhupinder Aspen DO

 

                    10/16/2012          Nurse visit         Bhupinder Aspen DO

 

                    2012            Nurse visit         Bhupinder Aspen DO

 

                    2012            Voided              Bhupinder Aspen DO

 

                    2012            Nurse visit         Bhupinder Aspen DO

 

                    2012            Nurse visit         Bhupinder Aspen DO

 

                    2012           Nurse visit         Bhupinder Aspen DO

 

                    5/3/2012            Nurse visit         Bhupinder Aspen DO

 

                    2012           Nurse visit         Bhupinder Aspen DO

 

                    2012           Nurse visit         Bhupinder Aspen DO

 

                    2012           Nurse visit         Bhupinder Aspen DO

 

                    2011           Nurse visit         Bhupinder Aspen DO

 

                    10/20/2011          Nurse visit         Bhupinder Aspen DO

 

                    2011           Office visit        Bhupinder Aspen DO

 

                    2011           Nurse visit         Radha Weston GREEN

 

                    2011            Office visit        Bhupinder Aspen DO

 

                    2011           Nurse visit         Bhupinder Aspen DO

 

                    2011            Office visit        Bhupinder Aspen DO

 

                    2011           Office visit        Bhupinder Nealte DO

 

                    5/10/2011           Office visit        Bhupinder Aspen DO

 

                    2011           Office visit        Bhupinder Louise DO

 

                    4/15/2011           Office visit        Devin Angel DO

 

                    2011           Office visit        Devin Angel DO

 

                    10/15/2010          Office visit        Devin Angel DO

 

                    2010           Office visit        Devin Angel DO

 

                    2010            Office visit        Devin Angel DO

 

                    2010           Office visit        Devin Angel DO

 

                    2010            Office visit        Devin Angel DO

 

                    3/8/2010            Office visit        Devin Masterson MD

 

                    2010            Surgery             Devin Masterson MD

 

                    2010            Office visit        Devin Angel DO

 

                    2010           Surgery             Devin Masterson MD

 

                    2010           Hospital            Devin Masterson MD

 

                    2010           Hospital            Devin Masterson MD

 

                    10/22/2009          Office visit        Devin Angel DO

## 2019-06-26 NOTE — XMS REPORT
Continuity of Care Document

                             Created on: 2019



Pauline Gan

External Reference #: [id number]

: 1950

Sex: Female



Demographics







                          Address                   [address] [place]  [address]

 

                          Home Phone                [phone] x

 

                          Preferred Language        Unknown

 

                          Marital Status            Unknown

 

                          Islam Affiliation     Unknown

 

                          Race                      Unknown

 

                          Ethnic Group              Unknown





Author







                          Organization              Unknown

 

                          Address                   Unknown

 

                          Phone                     [phone]



              



Allergies

      





             Active              Description              Code              Type              Severity

                Reaction              Onset              Reported/Identified              Relationship

 to Patient                             Clinical Status        

 

                Yes              No Known Drug Allergies              [id number]                        

           N/A              N/A                                                                    



 

                Yes              NKANo Known Allergies              NKA              Miscellaneous Allergy

              Unknown              N/A                             2006              

                                                 



                    



Medications

      



There is no data.                  



Problems

      





             Date Dx Coded              Attending              Type              Code              Diagnosis

                                        Diagnosed By        

 

           03/15/2010                             Ot              158.9                              

        

 

           03/15/2010                             Ot              V58.81                             

         

 

           10/19/2011                             Ot              233.1                              

        

 

           10/19/2011                             Ot              619.1                              

        

 

           10/19/2011                             Ot              623.8                              

        

 

             2013                             Ot              281.1              B12 DEFIC ANEMIA

 NEC                                             

 

             2013                             Ot              296.80              BIPOLAR DISORDER,

 UNSPECIFIED                                     

 

             2013                             Ot              585.3              CHRONIC KIDNEY

 DISEASE, STAGE III (MODER                       

 

             2013                             Ot              V10.09              HX OF GI MALIGNANCY

 NEC                                             

 

             2013                             Ot              V10.43              HX OF OVARIAN

 MALIGNANCY                                      

 

             2013                             Ot              V44.3              COLOSTOMY STATUS

                                                 

 

             2013                             Ot              V45.77              ACQRD ABSENCE

 OF GENITAL ORGANS                               

 

             2013                             Ot              V58.69              OTH MED,LT,CURRENT

 USE                                             

 

             2013                             Ot              V67.1              RADIOTHERAPY FOLLOW-

UP                                               

 

             2013                             Ot              V67.2              CHEMOTHERAPY FOLLOW-

UP                                               

 

             2014              NATHAN CLEMONS              Ot              585.3              

                                                 

 

                2014              NATHAN CLEMONS N              Ot              V10.43           

                                                             

 

             2014              NATHAN CLEMONS N              Ot              V44.3              

                                                 

 

                2014              NATHAN CLEMONS N              Ot              V58.69           

                                                             

 

             2014              NATHAN CLEMONS              Ot              V67.1              

                                                 

 

             2014              NATHAN CLEMONS              Ot              V67.2              

                                                 

 

                2014              NATHAN CLEMONS              Ot              V88.01           

                                                             

 

                2015              OTHER, UNLISTED              Ot              V58.69           

                                                             

 

                2015              OTHER, UNLISTED              Ot              V58.83           

                                                             

 

             2015              DEONTE, BOBAN N              Ot              585.3              

                                                 

 

                2015              NATHAN CLEMONS N              Ot              V10.43           

                                                             

 

             2015              NATHAN CLEMONS N              Ot              V44.3              

                                                 

 

                2015              NATHAN CLEMONS N              Ot              V58.69           

                                                             

 

             2015              NATHAN CLEMONS N              Ot              V67.1              

                                                 

 

             2015              NATHAN CLEMONS              Ot              V67.2              

                                                 

 

                2015              NATHAN CLEMONS              Ot              V88.01           

                                                             

 

           2015                             Ot              158.9                              

        

 

           2015                             Ot              V10.09                             

         

 

           2015                             Ot              V10.43                             

         

 

           2015                             Ot              V44.3                              

        

 

           2015                             Ot              V45.77                             

         

 

           2015                             Ot              V58.69                             

         

 

           2015                             Ot              V67.1                              

        

 

           2015                             Ot              V67.2                              

        

 

           2015                             Ot              311                                

      

 

           2015                             Ot              V10.09                             

         

 

           2015                             Ot              V10.43                             

         

 

           2015                             Ot              V45.77                             

         

 

           2015                             Ot              V58.69                             

         

 

           2015                             Ot              V67.1                              

        

 

           2015                             Ot              V67.2                              

        

 

           2015                             Ot              158.9                              

        

 

           2015                             Ot              V58.69                             

         

 

           2015                             Ot              183.0                              

        

 

           2015                             Ot              233.31                             

         

 

           2015                             Ot              285.9                              

        

 

           2015                             Ot              V10.44                             

         

 

           2015                             Ot              V72.63                             

         

 

           2015                             Ot              281.1                              

        

 

           2015                             Ot              296.80                             

         

 

           2015                             Ot              585.3                              

        

 

           2015                             Ot              V10.09                             

         

 

           2015                             Ot              V10.43                             

         

 

           2015                             Ot              V44.3                              

        

 

           2015                             Ot              V45.77                             

         

 

           2015                             Ot              V58.69                             

         

 

           2015                             Ot              V67.1                              

        

 

           2015                             Ot              V67.2                              

        

 

           2015                             Ot              158.9                              

        

 

           2015                             Ot              V58.69                             

         

 

           2015                             Ot              V58.83                             

         

 

           2015                             Ot              V58.69                             

         

 

           2015                             Ot              V58.83                             

         

 

                2015              ISABELLA COKERP              Ot              281.1      

                                                             

 

                2015              IASBELLA COKERP              Ot              296.80     

                                                             

 

                2015              COKER, HILAH S ARNP              Ot              585.3      

                                                             

 

                2015              COKER, ISABELLA S ARNP              Ot              V10.09     

                                                             

 

                2015              COKER, ISABELLA S ARNP              Ot              V10.43     

                                                             

 

                2015              COKER, ISABELLA S ARNP              Ot              V44.3      

                                                             

 

                2015              COKER, ISABELLA S ARNP              Ot              V45.77     

                                                             

 

                2015              COKER, ISABELLA S ARNP              Ot              V58.69     

                                                             

 

                2015              COKER, ISABELLA S ARNP              Ot              V67.1      

                                                             

 

                2015              COKER, ISABELLA S ARNP              Ot              V67.2      

                                                             

 

             2015              DEONTE, BOBAN N              Ot              585.3              

                                                 

 

                2015              DEONTE, BOBAN N              Ot              V10.43           

                                                             

 

             2015              DEONTE, BOBAN N              Ot              V44.3              

                                                 

 

                2015              DEONTE, BOBAN N              Ot              V58.69           

                                                             

 

             2015              DEONTE, BOBAN N              Ot              V67.1              

                                                 

 

             2015              DEONTE, BOBAN N              Ot              V67.2              

                                                 

 

                2015              DEONTE, BOBAN N              Ot              V88.01           

                                                             

 

                2015              COKER, ISABELLA S ARNP              Ot              585.3      

                                                             

 

                2015              COKER, ISABELLA S ARNP              Ot              V10.43     

                                                             

 

                2015              COKER, ISABELLA S ARNP              Ot              V44.3      

                                                             

 

                2015              COKER, ISABELLA S ARNP              Ot              V58.69     

                                                             

 

                2015              COKER, ISABELLA S ARNP              Ot              V67.1      

                                                             

 

                2015              COKER, ISABELLA S ARNP              Ot              V67.2      

                                                             

 

                2015              COKER, ISABELLA S ARNP              Ot              V88.01     

                                                             

 

             2015              DEONTE, BOBAN N              Ot              585.3              

                                                 

 

                2015              DEONTE, BOBAN N              Ot              V10.43           

                                                             

 

             2015              DEONTE, BOBAN N              Ot              V44.3              

                                                 

 

                2015              DEONTE, BOBAN N              Ot              V58.69           

                                                             

 

             2015              DEONTE, BOBAN N              Ot              V67.1              

                                                 

 

             2015              DEONTE, BOBAN N              Ot              V67.2              

                                                 

 

                2015              DEONTE, BOBAN N              Ot              V88.01           

                                                             

 

                2015              OTHER, UNLISTED              Ot              V58.69           

                                                             

 

                2015              OTHER, UNLISTED              Ot              V58.83           

                                                             

 

           2015                             Ot              158.9                              

        

 

           2015                             Ot              V10.09                             

         

 

           2015                             Ot              V10.43                             

         

 

           2015                             Ot              V44.3                              

        

 

           2015                             Ot              V45.77                             

         

 

           2015                             Ot              V58.69                             

         

 

           2015                             Ot              V67.1                              

        

 

           2015                             Ot              V67.2                              

        

 

           2015                             Ot              311                                

      

 

           2015                             Ot              V10.09                             

         

 

           2015                             Ot              V10.43                             

         

 

           2015                             Ot              V45.77                             

         

 

           2015                             Ot              V58.69                             

         

 

           2015                             Ot              V67.1                              

        

 

           2015                             Ot              V67.2                              

        

 

           2015                             Ot              158.9                              

        

 

           2015                             Ot              V58.69                             

         

 

           2015                             Ot              183.0                              

        

 

           2015                             Ot              233.31                             

         

 

           2015                             Ot              285.9                              

        

 

           2015                             Ot              V10.44                             

         

 

           2015                             Ot              V72.63                             

         

 

           2015                             Ot              281.1                              

        

 

           2015                             Ot              296.80                             

         

 

           2015                             Ot              585.3                              

        

 

           2015                             Ot              V10.09                             

         

 

           2015                             Ot              V10.43                             

         

 

           2015                             Ot              V44.3                              

        

 

           2015                             Ot              V45.77                             

         

 

           2015                             Ot              V58.69                             

         

 

           2015                             Ot              V67.1                              

        

 

           2015                             Ot              V67.2                              

        

 

           2015                             Ot              158.9                              

        

 

           2015                             Ot              V58.69                             

         

 

           2015                             Ot              V58.83                             

         

 

           2015                             Ot              V58.69                             

         

 

           2015                             Ot              V58.83                             

         

 

                2015              ISABELLA COKERP              Ot              281.1      

                                                             

 

                2015              ISABELLA COKER ARNP              Ot              296.80     

                                                             

 

                2015              ISABELLA COKERP              Ot              585.3      

                                                             

 

                2015              ISABELLA COKER ARNP              Ot              V10.09     

                                                             

 

                2015              ISABELLA COKER ARNP              Ot              V10.43     

                                                             

 

                2015              ISABELLA COKER ARNP              Ot              V44.3      

                                                             

 

                2015              ISABELLA COKER ARNP              Ot              V45.77     

                                                             

 

                2015              ISABELLA COKER S ARNP              Ot              V58.69     

                                                             

 

                2015              COKER, ISABELLA S ARNP              Ot              V67.1      

                                                             

 

                2015              COKER, HILMICHAEL S ARNP              Ot              V67.2      

                                                             

 

             2015              DEONTE, NATHAN N              Ot              585.3              

                                                 

 

                2015              DEONTE, NATHAN N              Ot              V10.43           

                                                             

 

             2015              DEONTE, NATHAN N              Ot              V44.3              

                                                 

 

                2015              DEONTE, NATHAN N              Ot              V58.69           

                                                             

 

             2015              DEONTE, NATHAN N              Ot              V67.1              

                                                 

 

             2015              DEONTE, NATHAN N              Ot              V67.2              

                                                 

 

                2015              DEONTE, NATHAN N              Ot              V88.01           

                                                             

 

                2015              COKER, ISABELLA S ARNP              Ot              585.3      

                                                             

 

                2015              COKER, ISABELLA S ARNP              Ot              V10.43     

                                                             

 

                2015              COKER, ISABELLA S ARNP              Ot              V44.3      

                                                             

 

                2015              COKER, ISABELLA S ARNP              Ot              V58.69     

                                                             

 

                2015              COKER, ISABELLA S ARNP              Ot              V67.1      

                                                             

 

                2015              COKER, ISABELLA S ARNP              Ot              V67.2      

                                                             

 

                2015              COKER, ISABELLA S ARNP              Ot              V88.01     

                                                             

 

             2015              DEONTE, NATHAN N              Ot              585.3              

                                                 

 

                2015              DEONTE, NATHAN N              Ot              V10.43           

                                                             

 

             2015              DEONTE, NATHAN N              Ot              V44.3              

                                                 

 

                2015              DEONTE, NATHAN N              Ot              V58.69           

                                                             

 

             2015              DEONTE, NATHAN N              Ot              V67.1              

                                                 

 

             2015              DEONTE, NATHAN N              Ot              V67.2              

                                                 

 

                2015              DEONTE, NATHAN N              Ot              V88.01           

                                                             

 

                2015              OTHER, UNLISTED              Ot              V58.69           

                                                             

 

                2015              OTHER, UNLISTED              Ot              V58.83           

                                                             

 

                2015              COKER, ISABELLA S ARNP              Ot              585.3      

                                                             

 

                2015              COKER, HILMICHAEL S ARNP              Ot              V10.43     

                                                             

 

                2015              COKER, HILAH S ARNP              Ot              V44.3      

                                                             

 

                2015              COKER, HILAH S ARNP              Ot              V58.69     

                                                             

 

                2015              ISABELLA COKERP              Ot              V67.1      

                                                             

 

                2015              ISABELLA COKERP              Ot              V67.2      

                                                             

 

                2015              ISABELLA COKERP              Ot              V88.01     

                                                             

 

             2015              NATHAN CLEMONS              Ot              585.3              

                                                 

 

                2015              NATHAN CLEMONS N              Ot              V10.43           

                                                             

 

             2015              NATHAN CLEMONS N              Ot              V44.3              

                                                 

 

                2015              NATHAN CLEMONS N              Ot              V58.69           

                                                             

 

             2015              NATHAN CLEMONS N              Ot              V67.1              

                                                 

 

             2015              NATHAN CLEMONS N              Ot              V67.2              

                                                 

 

                2015              NATHAN CLEMONS              Ot              V88.01           

                                                             

 

           2015                             Ot              158.9                              

        

 

           2015                             Ot              V10.09                             

         

 

           2015                             Ot              V10.43                             

         

 

           2015                             Ot              V44.3                              

        

 

           2015                             Ot              V45.77                             

         

 

           2015                             Ot              V58.69                             

         

 

           2015                             Ot              V67.1                              

        

 

           2015                             Ot              V67.2                              

        

 

           2015                             Ot              311                                

      

 

           2015                             Ot              V10.09                             

         

 

           2015                             Ot              V10.43                             

         

 

           2015                             Ot              V45.77                             

         

 

           2015                             Ot              V58.69                             

         

 

           2015                             Ot              V67.1                              

        

 

           2015                             Ot              V67.2                              

        

 

           2015                             Ot              158.9                              

        

 

           2015                             Ot              V58.69                             

         

 

           2015                             Ot              183.0                              

        

 

           2015                             Ot              233.31                             

         

 

           2015                             Ot              285.9                              

        

 

           2015                             Ot              V10.44                             

         

 

           2015                             Ot              V72.63                             

         

 

           2015                             Ot              281.1                              

        

 

           2015                             Ot              296.80                             

         

 

           2015                             Ot              585.3                              

        

 

           2015                             Ot              V10.09                             

         

 

           2015                             Ot              V10.43                             

         

 

           2015                             Ot              V44.3                              

        

 

           2015                             Ot              V45.77                             

         

 

           2015                             Ot              V58.69                             

         

 

           2015                             Ot              V67.1                              

        

 

           2015                             Ot              V67.2                              

        

 

           2015                             Ot              158.9                              

        

 

           2015                             Ot              V58.69                             

         

 

           2015                             Ot              V58.83                             

         

 

           2015                             Ot              V58.69                             

         

 

           2015                             Ot              V58.83                             

         

 

                2015              COKERISABELLA MUSA S ARNP              Ot              281.1      

                                                             

 

                2015              COKERISABELLA S ARNP              Ot              296.80     

                                                             

 

                2015              COKERISABELLA S ARNP              Ot              585.3      

                                                             

 

                2015              COKER, ISABELLA S ARNP              Ot              V10.09     

                                                             

 

                2015              COKER, ISABELLA S ARNP              Ot              V10.43     

                                                             

 

                2015              COKERISABELLA S ARNP              Ot              V44.3      

                                                             

 

                2015              COKERISABELLA S ARNP              Ot              V45.77     

                                                             

 

                2015              COKERISABELLA S ARNP              Ot              V58.69     

                                                             

 

                2015              COKERISABELLA S ARNP              Ot              V67.1      

                                                             

 

                2015              COKERISABELLA S ARNP              Ot              V67.2      

                                                             

 

             2015              DEONTE, LBYA N              Ot              585.3              

                                                 

 

                2015              DEONTE, LBYA N              Ot              V10.43           

                                                             

 

             2015              DEONTE, BOBAN N              Ot              V44.3              

                                                 

 

                2015              DEONTE, LBAN N              Ot              V58.69           

                                                             

 

             2015              DEONTE, LBYA N              Ot              V67.1              

                                                 

 

             2015              DEONTE, BOBAN N              Ot              V67.2              

                                                 

 

                2015              DEONTE, BOBAN N              Ot              V88.01           

                                                             

 

                2015              COKERISABELLA S ARNP              Ot              585.3      

                                                             

 

                2015              COKERISABELLA S ARNP              Ot              V10.43     

                                                             

 

                2015              COKERISABELLA S ARNP              Ot              V44.3      

                                                             

 

                2015              COKERISABELLA S ARNP              Ot              V58.69     

                                                             

 

                2015              COKER, ISABELLA S ARNP              Ot              V67.1      

                                                             

 

                2015              COKER, ISABELLA S ARNP              Ot              V67.2      

                                                             

 

                2015              COKER, ISABELLA S ARNP              Ot              V88.01     

                                                             

 

             2015              DEONTE, BOBAN N              Ot              585.3              

                                                 

 

                2015              DEONTE, BOBAN N              Ot              V10.43           

                                                             

 

             2015              DEONTE, BOBAN N              Ot              V44.3              

                                                 

 

                2015              DEONTE, BOBAN N              Ot              V58.69           

                                                             

 

             2015              DEONTE, NATHAN N              Ot              V67.1              

                                                 

 

             2015              DEONTE, NATHAN N              Ot              V67.2              

                                                 

 

                2015              DEONTE, NATHAN N              Ot              V88.01           

                                                             

 

                2015              OTHER, UNLISTED              Ot              V58.69           

                                                             

 

                2015              OTHER, UNLISTED              Ot              V58.83           

                                                             

 

             2015              DEONTE, NATHAN N              Ot              585.3              

                                                 

 

                2015              DEONTE, BOBYA N              Ot              V10.43           

                                                             

 

             2015              DEONTE, NATHAN N              Ot              V44.3              

                                                 

 

                2015              DEONTE, NATHAN N              Ot              V58.69           

                                                             

 

             2015              DEONTE, NATHAN N              Ot              V67.1              

                                                 

 

             2015              DEONTE, NATHAN N              Ot              V67.2              

                                                 

 

                2015              DEONTE, NATHAN N              Ot              V88.01           

                                                             

 

                2015              JOHN PAUL JULIOMICHAEL S ARNP              Ot              281.1      

                                                             

 

                2015              JULIO COKER S ARNP              Ot              296.80     

                                                             

 

                2015              JULIO COKER S ARNP              Ot              585.3      

                                                             

 

                2015              JOHN PAUL HILAH S ARNP              Ot              V10.09     

                                                             

 

                2015              COKER HILAH S ARNP              Ot              V10.43     

                                                             

 

                2015              COKER HILAH S ARNP              Ot              V44.3      

                                                             

 

                2015              COKER HILAH S ARNP              Ot              V45.77     

                                                             

 

                2015              COKER, JULIOAH S ARNP              Ot              V58.69     

                                                             

 

                2015              JOHN PAUL HILAH S ARNP              Ot              V67.1      

                                                             

 

                2015              COKER HILAH S ARNP              Ot              V67.2      

                                                             

 

                2015              COKER HILAH S ARNP              Ot              281.1      

                                                             

 

                2015              COKER HILAH S ARNP              Ot              296.80     

                                                             

 

                2015              COKER HILAH S ARNP              Ot              585.3      

                                                             

 

                2015              COKER HILAH S ARNP              Ot              V10.09     

                                                             

 

                2015              COKER HILAH S ARNP              Ot              V10.43     

                                                             

 

                2015              COKER HILAH S ARNP              Ot              V44.3      

                                                             

 

                2015              COKER HILAH S ARNP              Ot              V45.77     

                                                             

 

                2015              COKERISABELLA MUSA ARNP              Ot              V58.69     

                                                             

 

                2015              COKERISABELLA MUSA ARNP              Ot              V67.1      

                                                             

 

                2015              ISABELLA COKER ARNP              Ot              V67.2      

                                                             

 

             2015              NATHAN CLEMONS              Ot              585.3              

                                                 

 

                2015              NATHAN CLEMONS N              Ot              V10.43           

                                                             

 

             2015              NATHAN CLEMONS N              Ot              V44.3              

                                                 

 

                2015              NATHAN CLEMONS N              Ot              V58.69           

                                                             

 

             2015              NATHAN CLEMONS N              Ot              V67.1              

                                                 

 

             2015              NATHAN CLEMONS N              Ot              V67.2              

                                                 

 

                2015              NATHAN CLEMONS              Ot              V88.01           

                                                             

 

                2015              COKERISABELLA MUSA ARNP              Ot              585.3      

                                                             

 

                2015              COEKRISABELLA MUSA ARNP              Ot              V10.43     

                                                             

 

                2015              COKERISABELLA MUSA ARNP              Ot              V44.3      

                                                             

 

                2015              COKERISABELLA MUSA ARNP              Ot              V58.69     

                                                             

 

                2015              COKERISABELLA MUSA ARNP              Ot              V67.1      

                                                             

 

                2015              ISABELLA COKER ARNP              Ot              V67.2      

                                                             

 

                2015              COKERISABELLA ARNP              Ot              V88.01     

                                                             

 

           2015                             Ot              158.9                              

        

 

           2015                             Ot              V10.09                             

         

 

           2015                             Ot              V10.43                             

         

 

           2015                             Ot              V44.3                              

        

 

           2015                             Ot              V45.77                             

         

 

           2015                             Ot              V58.69                             

         

 

           2015                             Ot              V67.1                              

        

 

           2015                             Ot              V67.2                              

        

 

           2015                             Ot              311                                

      

 

           2015                             Ot              V10.09                             

         

 

           2015                             Ot              V10.43                             

         

 

           2015                             Ot              V45.77                             

         

 

           2015                             Ot              V58.69                             

         

 

           2015                             Ot              V67.1                              

        

 

           2015                             Ot              V67.2                              

        

 

           2015                             Ot              158.9                              

        

 

           2015                             Ot              V58.69                             

         

 

           2015                             Ot              183.0                              

        

 

           2015                             Ot              233.31                             

         

 

           2015                             Ot              285.9                              

        

 

           2015                             Ot              V10.44                             

         

 

           2015                             Ot              V72.63                             

         

 

           2015                             Ot              281.1                              

        

 

           2015                             Ot              296.80                             

         

 

           2015                             Ot              585.3                              

        

 

           2015                             Ot              V10.09                             

         

 

           2015                             Ot              V10.43                             

         

 

           2015                             Ot              V44.3                              

        

 

           2015                             Ot              V45.77                             

         

 

           2015                             Ot              V58.69                             

         

 

           2015                             Ot              V67.1                              

        

 

           2015                             Ot              V67.2                              

        

 

           2015                             Ot              158.9                              

        

 

           2015                             Ot              V58.69                             

         

 

           2015                             Ot              V58.83                             

         

 

           2015                             Ot              V58.69                             

         

 

           2015                             Ot              V58.83                             

         

 

                2015              JOHN PAUL ISABELLA S ARNP              Ot              281.1      

                                                             

 

                2015              JOHN PAUL ISABELLA S ARNP              Ot              296.80     

                                                             

 

                2015              JOHN PAUL ISABELLA S ARNP              Ot              585.3      

                                                             

 

                2015              JOHN PAUL ISABELLA S ARNP              Ot              V10.09     

                                                             

 

                2015              JOHN PAUL ISABELLA S ARNP              Ot              V10.43     

                                                             

 

                2015              JOHN PAUL ISABELLA S ARNP              Ot              V44.3      

                                                             

 

                2015              JOHN PAUL ISABELLA S ARNP              Ot              V45.77     

                                                             

 

                2015              JOHN PAUL ISABELLA S ARNP              Ot              V58.69     

                                                             

 

                2015              JOHN PAUL ISABELLA S ARNP              Ot              V67.1      

                                                             

 

                2015              JOHN PAUL ISABELLA S ARNP              Ot              V67.2      

                                                             

 

             2015              NATHAN CLEMONS N              Ot              585.3              

                                                 

 

                2015              DEONTENATHAN GARCIA N              Ot              V10.43           

                                                             

 

             2015              DEONTENATHAN GARCIA N              Ot              V44.3              

                                                 

 

                2015              DEONTELB GARCIAAN N              Ot              V58.69           

                                                             

 

             2015              DEONTE, BOBAN N              Ot              V67.1              

                                                 

 

             2015              DEONTE, BOBAN N              Ot              V67.2              

                                                 

 

                2015              DEONTELB GARCIAAN N              Ot              V88.01           

                                                             

 

                2015              JOHN PAUL ISABELLA S ARNP              Ot              585.3      

                                                             

 

                2015              JOHN PAUL ISABELLA S ARNP              Ot              V10.43     

                                                             

 

                2015              JOHN PAUL ISABELLA S ARNP              Ot              V44.3      

                                                             

 

                2015              COKER, HILAH S ARNP              Ot              V58.69     

                                                             

 

                2015              ISABELLA COKER ARNP              Ot              V67.1      

                                                             

 

                2015              ISABELLA COKER ARNP              Ot              V67.2      

                                                             

 

                2015              ISABELLA COKERP              Ot              V88.01     

                                                             

 

             2015              NATHAN CLEMONS              Ot              585.3              

                                                 

 

                2015              NATHAN CLEMONS N              Ot              V10.43           

                                                             

 

             2015              NATHAN CLEMONS N              Ot              V44.3              

                                                 

 

                2015              NATHAN CLEMONS N              Ot              V58.69           

                                                             

 

             2015              NATHAN CLEMONS N              Ot              V67.1              

                                                 

 

             2015              NATHAN CLEMONS N              Ot              V67.2              

                                                 

 

                2015              NATHAN CLEMONS              Ot              V88.01           

                                                             

 

                2015              OTHER, UNLISTED              Ot              V58.69           

                                                             

 

                2015              OTHER, UNLISTED              Ot              V58.83           

                                                             

 

           2015                             Ot              158.9                              

        

 

           2015                             Ot              V10.09                             

         

 

           2015                             Ot              V10.43                             

         

 

           2015                             Ot              V44.3                              

        

 

           2015                             Ot              V45.77                             

         

 

           2015                             Ot              V58.69                             

         

 

           2015                             Ot              V67.1                              

        

 

           2015                             Ot              V67.2                              

        

 

           2015                             Ot              311                                

      

 

           2015                             Ot              V10.09                             

         

 

           2015                             Ot              V10.43                             

         

 

           2015                             Ot              V45.77                             

         

 

           2015                             Ot              V58.69                             

         

 

           2015                             Ot              V67.1                              

        

 

           2015                             Ot              V67.2                              

        

 

           2015                             Ot              158.9                              

        

 

           2015                             Ot              V58.69                             

         

 

           2015                             Ot              183.0                              

        

 

           2015                             Ot              233.31                             

         

 

           2015                             Ot              285.9                              

        

 

           2015                             Ot              V10.44                             

         

 

           2015                             Ot              V72.63                             

         

 

           2015                             Ot              281.1                              

        

 

           2015                             Ot              296.80                             

         

 

           2015                             Ot              585.3                              

        

 

           2015                             Ot              V10.09                             

         

 

           2015                             Ot              V10.43                             

         

 

           2015                             Ot              V44.3                              

        

 

           2015                             Ot              V45.77                             

         

 

           2015                             Ot              V58.69                             

         

 

           2015                             Ot              V67.1                              

        

 

           2015                             Ot              V67.2                              

        

 

           2015                             Ot              158.9                              

        

 

           2015                             Ot              V58.69                             

         

 

           2015                             Ot              V58.83                             

         

 

           2015                             Ot              V58.69                             

         

 

           2015                             Ot              V58.83                             

         

 

                2015              ISABELLA COKER ARNP              Ot              281.1      

                                                             

 

                2015              COKERISABELLA MUSA S ARNP              Ot              296.80     

                                                             

 

                2015              COKERISABELLA MUSA S ARNP              Ot              585.3      

                                                             

 

                2015              COKERISABELLA MUSA S ARNP              Ot              V10.09     

                                                             

 

                2015              COKERISABELLA S ARNP              Ot              V10.43     

                                                             

 

                2015              COKERISABELLA MUSA S ARNP              Ot              V44.3      

                                                             

 

                2015              COKERISABELLA MUSA S ARNP              Ot              V45.77     

                                                             

 

                2015              COKERISABELLA MUSA S ARNP              Ot              V58.69     

                                                             

 

                2015              COKERISABELLA MUSA S ARNP              Ot              V67.1      

                                                             

 

                2015              COKERISABELLA MUSA S ARNP              Ot              V67.2      

                                                             

 

             2015              NATHAN CLEMONS N              Ot              585.3              

                                                 

 

                2015              DEONTENATHAN GARCIA N              Ot              V10.43           

                                                             

 

             2015              DEONTENATHAN GARCIA N              Ot              V44.3              

                                                 

 

                2015              NATHAN CLEMONS N              Ot              V58.69           

                                                             

 

             2015              NATHAN CLEMONS N              Ot              V67.1              

                                                 

 

             2015              DEONTENATHAN GARCIA N              Ot              V67.2              

                                                 

 

                2015              DEONTENATHAN GARCIA N              Ot              V88.01           

                                                             

 

                2015              COKERISABELLA MUSA S ARNP              Ot              585.3      

                                                             

 

                2015              COKERISABELLA MUSA S ARNP              Ot              V10.43     

                                                             

 

                2015              COKERISABELLA MUSA S ARNP              Ot              V44.3      

                                                             

 

                2015              COKERISABELLA MUSA S ARNP              Ot              V58.69     

                                                             

 

                2015              COKERISABELLA MUSA S ARNP              Ot              V67.1      

                                                             

 

                2015              COKERISABELLA S ARNP              Ot              V67.2      

                                                             

 

                2015              COKER ISABELLA S ARNP              Ot              V88.01     

                                                             

 

             2015              DEONTELB GARCIAYA N              Ot              585.3              

                                                 

 

                2015              NATHAN CLEMONS N              Ot              V10.43           

                                                             

 

             2015              NATHAN CLEMONS              Ot              V44.3              

                                                 

 

                2015              NATHAN CLEMONS N              Ot              V58.69           

                                                             

 

             2015              NATHAN CLEMONS N              Ot              V67.1              

                                                 

 

             2015              NATHAN CLEMONS N              Ot              V67.2              

                                                 

 

                2015              NATHAN CLEMONS              Ot              V88.01           

                                                             

 

                2015              OTHER, UNLISTED              Ot              V58.69           

                                                             

 

                2015              OTHER, UNLISTED              Ot              V58.83           

                                                             

 

                2015              JOHN PAUL ISABELLA S ARNP              Ot              266.2      

                                                             

 

                2015              JOHN PAUL ISABELLA S ARNP              Ot              311        

                                                             

 

                2015              ISABELLA COKER ARNP              Ot              585.3      

                                                             

 

                2015              ISABELLA COKER ARNP              Ot              V10.43     

                                                             

 

                2015              JOHN PAUL ISABELLA MUSA ARNP              Ot              V44.3      

                                                             

 

                2015              ISABELLA COKER S ARNP              Ot              V58.69     

                                                             

 

                2015              JOHN PAUL ISABELLA MUSA ARNP              Ot              V58.81     

                                                             

 

                2015              JOHN PAUL ISABELLA MUSA ARNP              Ot              V67.1      

                                                             

 

                2015              JOHN PAUL ISABELLA MUSA ARNP              Ot              V67.2      

                                                             

 

                2015              ISABELLA COKER S ARNP              Ot              V88.01     

                                                             

 

           2015                             Ot              158.9                              

        

 

           2015                             Ot              V10.09                             

         

 

           2015                             Ot              V10.43                             

         

 

           2015                             Ot              V44.3                              

        

 

           2015                             Ot              V45.77                             

         

 

           2015                             Ot              V58.69                             

         

 

           2015                             Ot              V67.1                              

        

 

           2015                             Ot              V67.2                              

        

 

           2015                             Ot              311                                

      

 

           2015                             Ot              V10.09                             

         

 

           2015                             Ot              V10.43                             

         

 

           2015                             Ot              V45.77                             

         

 

           2015                             Ot              V58.69                             

         

 

           2015                             Ot              V67.1                              

        

 

           2015                             Ot              V67.2                              

        

 

           2015                             Ot              158.9                              

        

 

           2015                             Ot              V58.69                             

         

 

           2015                             Ot              183.0                              

        

 

           2015                             Ot              233.31                             

         

 

           2015                             Ot              285.9                              

        

 

           2015                             Ot              V10.44                             

         

 

           2015                             Ot              V72.63                             

         

 

           2015                             Ot              281.1                              

        

 

           2015                             Ot              296.80                             

         

 

           2015                             Ot              585.3                              

        

 

           2015                             Ot              V10.09                             

         

 

           2015                             Ot              V10.43                             

         

 

           2015                             Ot              V44.3                              

        

 

           2015                             Ot              V45.77                             

         

 

           2015                             Ot              V58.69                             

         

 

           2015                             Ot              V67.1                              

        

 

           2015                             Ot              V67.2                              

        

 

           2015                             Ot              158.9                              

        

 

           2015                             Ot              V58.69                             

         

 

           2015                             Ot              V58.83                             

         

 

           2015                             Ot              V58.69                             

         

 

           2015                             Ot              V58.83                             

         

 

                2015              ISABELLA COKER S ARNP              Ot              281.1      

                                                             

 

                2015              JOHN PAUL JULIOMICHAEL S ARNP              Ot              296.80     

                                                             

 

                2015              ISABELLA OCKER S ARNP              Ot              585.3      

                                                             

 

                2015              ISABELLA COKER S ARNP              Ot              V10.09     

                                                             

 

                2015              JOHN PAUL ISABELLA S ARNP              Ot              V10.43     

                                                             

 

                2015              JOHN PAUL ISABELLA S ARNP              Ot              V44.3      

                                                             

 

                2015              JOHN PAUL ISABELLA S ARNP              Ot              V45.77     

                                                             

 

                2015              JOHN PAUL ISABELLA S ARNP              Ot              V58.69     

                                                             

 

                2015              JOHN PAUL ISABELLA S ARNP              Ot              V67.1      

                                                             

 

                2015              ISABELLA COKER S ARNP              Ot              V67.2      

                                                             

 

             2015              DEONTENATHAN GARCIA N              Ot              585.3              

                                                 

 

                2015              DEONTENATHAN GARCIA N              Ot              V10.43           

                                                             

 

             2015              DEONTE BOBAN N              Ot              V44.3              

                                                 

 

                2015              DEONTE, BOBAN N              Ot              V58.69           

                                                             

 

             2015              DEONTE, BOBAN N              Ot              V67.1              

                                                 

 

             2015              DEONTE, BOBAN N              Ot              V67.2              

                                                 

 

                2015              DEONTE, BOBAN N              Ot              V88.01           

                                                             

 

                2015              JOHN PAUL JULIOMICHAEL S ARNP              Ot              585.3      

                                                             

 

                2015              ISABELLA COKER S ARNP              Ot              V10.43     

                                                             

 

                2015              JOHN PAUL ISABELLA S ARNP              Ot              V44.3      

                                                             

 

                2015              ISABELLA COKER S ARNP              Ot              V58.69     

                                                             

 

                2015              COKERISABELLA MUSA S ARNP              Ot              V67.1      

                                                             

 

                2015              COKERISABELLA MUSA S ARNP              Ot              V67.2      

                                                             

 

                2015              COKERISABELLA MUSA S ARNP              Ot              V88.01     

                                                             

 

             2015              DEONTE, NATHAN N              Ot              585.3              

                                                 

 

                2015              DEONTE, NATHAN N              Ot              V10.43           

                                                             

 

             2015              DEONTE, LBAN N              Ot              V44.3              

                                                 

 

                2015              DEONTE, LBAN N              Ot              V58.69           

                                                             

 

             2015              DEONTE, NATHAN N              Ot              V67.1              

                                                 

 

             2015              DEONTE, NATHAN N              Ot              V67.2              

                                                 

 

                2015              DEONTE, NATHAN N              Ot              V88.01           

                                                             

 

                2015              OTHER, UNLISTED              Ot              V58.69           

                                                             

 

                2015              OTHER, UNLISTED              Ot              V58.83           

                                                             

 

                2015              JOHN PAULISABELLA S ARNP              Ot              266.2      

                                                             

 

                2015              JOHN PAULISABELLA S ARNP              Ot              311        

                                                             

 

                2015              COKERISABELLA S ARNP              Ot              585.3      

                                                             

 

                2015              COKERISABELLA S ARNP              Ot              V10.43     

                                                             

 

                2015              COKERISABELLA S ARNP              Ot              V44.3      

                                                             

 

                2015              COKERISABELLA S ARNP              Ot              V58.69     

                                                             

 

                2015              JOHN PAUL ISABELLA S ARNP              Ot              V58.81     

                                                             

 

                2015              COKERISABELLA S ARNP              Ot              V67.1      

                                                             

 

                2015              COKERISABELLA S ARNP              Ot              V67.2      

                                                             

 

                2015              COKERISABELLA S ARNP              Ot              V88.01     

                                                             

 

                06/15/2015              COKERISABELLA S ARNP              Ot              266.2      

                                                             

 

                06/15/2015              COKER ISABELLA S ARNP              Ot              311        

                                                             

 

                06/15/2015              COKERISABELLA S ARNP              Ot              585.3      

                                                             

 

                06/15/2015              COKERISABELLA S ARNP              Ot              V10.43     

                                                             

 

                06/15/2015              COKER, ISABELLA S ARNP              Ot              V44.3      

                                                             

 

                06/15/2015              COKER, ISABELLA S ARNP              Ot              V58.69     

                                                             

 

                06/15/2015              COKER, HILAH S ARNP              Ot              V58.81     

                                                             

 

                06/15/2015              JOHN PAUL ISABELLA [facility]              Ot              V67.1      

                                                             

 

                06/15/2015              JOHN PAUL ISABELLA [facility]              Ot              V67.2      

                                                             

 

                06/15/2015              JOHN PAUL ISABELLA [facility]              Ot              V88.01     

                                                             

 

           2015                             Ot              158.9                              

        

 

           2015                             Ot              V10.09                             

         

 

           2015                             Ot              V10.43                             

         

 

           2015                             Ot              V44.3                              

        

 

           2015                             Ot              V45.77                             

         

 

           2015                             Ot              V58.69                             

         

 

           2015                             Ot              V67.1                              

        

 

           2015                             Ot              V67.2                              

        

 

           2015                             Ot              311                                

      

 

           2015                             Ot              V10.09                             

         

 

           2015                             Ot              V10.43                             

         

 

           2015                             Ot              V45.77                             

         

 

           2015                             Ot              V58.69                             

         

 

           2015                             Ot              V67.1                              

        

 

           2015                             Ot              V67.2                              

        

 

           2015                             Ot              158.9                              

        

 

           2015                             Ot              V58.69                             

         

 

           2015                             Ot              183.0                              

        

 

           2015                             Ot              233.31                             

         

 

           2015                             Ot              285.9                              

        

 

           2015                             Ot              V10.44                             

         

 

           2015                             Ot              V72.63                             

         

 

           2015                             Ot              281.1                              

        

 

           2015                             Ot              296.80                             

         

 

           2015                             Ot              585.3                              

        

 

           2015                             Ot              V10.09                             

         

 

           2015                             Ot              V10.43                             

         

 

           2015                             Ot              V44.3                              

        

 

           2015                             Ot              V45.77                             

         

 

           2015                             Ot              V58.69                             

         

 

           2015                             Ot              V67.1                              

        

 

           2015                             Ot              V67.2                              

        

 

           2015                             Ot              158.9                              

        

 

           2015                             Ot              V58.69                             

         

 

           2015                             Ot              V58.83                             

         

 

           2015                             Ot              V58.69                             

         

 

           2015                             Ot              V58.83                             

         

 

                2015              JOHN PAUL ISABELLA [facility] ARN              Ot              281.1      

                                                             

 

                2015              JOHN PAUL ISABELLA [facility]              Ot              296.80     

                                                             

 

                2015              ISABELLA COKER S [facility]              Ot              585.3      

                                                             

 

                2015              ISABELLA COKER ARNP              Ot              V10.09     

                                                             

 

                2015              JULIO COKERAH S ARNP              Ot              V10.43     

                                                             

 

                2015              ISABELLA COKER S ARNP              Ot              V44.3      

                                                             

 

                2015              COKERISABELLA MUSA S ARNP              Ot              V45.77     

                                                             

 

                2015              COKERISABELLA S ARNP              Ot              V58.69     

                                                             

 

                2015              COKERISABELLA S ARNP              Ot              V67.1      

                                                             

 

                2015              ISABELLA COKER S ARNP              Ot              V67.2      

                                                             

 

             2015              DEONTENATHAN GARCIA N              Ot              585.3              

                                                 

 

                2015              DEONTELB GARCIAAN N              Ot              V10.43           

                                                             

 

             2015              DEONTE, LBAN N              Ot              V44.3              

                                                 

 

                2015              DEONTE, LBAN N              Ot              V58.69           

                                                             

 

             2015              DEONTE, LBAN N              Ot              V67.1              

                                                 

 

             2015              DEONTE, BOBAN N              Ot              V67.2              

                                                 

 

                2015              DEONTELB GARCIAAN N              Ot              V88.01           

                                                             

 

                2015              ISABELLA COKER S ARNP              Ot              585.3      

                                                             

 

                2015              ISABELLA COKER S ARNP              Ot              V10.43     

                                                             

 

                2015              COKERISABELLA S ARNP              Ot              V44.3      

                                                             

 

                2015              COKERISABELLA S ARNP              Ot              V58.69     

                                                             

 

                2015              ISABELLA COKER S ARNP              Ot              V67.1      

                                                             

 

                2015              COKERISABELLA S ARNP              Ot              V67.2      

                                                             

 

                2015              COKERISABELLA MUSA S ARNP              Ot              V88.01     

                                                             

 

             2015              DEONTELB GARCIAAN N              Ot              585.3              

                                                 

 

                2015              DEONTELB GARCIAAN N              Ot              V10.43           

                                                             

 

             2015              DEONTE, BOBAN N              Ot              V44.3              

                                                 

 

                2015              DEONTE, BOBAN N              Ot              V58.69           

                                                             

 

             2015              DEONTE, BOBAN N              Ot              V67.1              

                                                 

 

             2015              DEONTE, BOBAN N              Ot              V67.2              

                                                 

 

                2015              DEONTE, BOBAN N              Ot              V88.01           

                                                             

 

                2015              OTHER, UNLISTED              Ot              V58.69           

                                                             

 

                2015              OTHER, UNLISTED              Ot              V58.83           

                                                             

 

                2015              COKERISABELLA S ARNP              Ot              266.2      

                                                             

 

                2015              COKER, HILAH S ARNP              Ot              311        

                                                             

 

                2015              ISABELLA COKER ARNP              Ot              585.3      

                                                             

 

                2015              ISABELLA COKER ARNP              Ot              V10.43     

                                                             

 

                2015              ISABELLA COKERP              Ot              V44.3      

                                                             

 

                2015              ISABELLA COKER ARNP              Ot              V58.69     

                                                             

 

                2015              ISABELLA COKER ARNP              Ot              V58.81     

                                                             

 

                2015              ISABELLA COKER ARNP              Ot              V67.1      

                                                             

 

                2015              ISABELLA COKER ARNP              Ot              V67.2      

                                                             

 

                2015              ISABELLA COKER ARNP              Ot              V88.01     

                                                             

 

             2015              NATHAN CLEMONS              Ot              F32.9              

                                                 

 

             2015              NATHAN CLEMONS              Ot              N18.3              

                                                 

 

             2015              NATHAN CLEMONS              Ot              Z08                

                                                 

 

                2015              NATHAN CLEMONS              Ot              Z79.899          

                                                             

 

                2015              NATHAN CLEMONS              Ot              Z85.43           

                                                             

 

           2015                             Ot              158.9                              

        

 

           2015                             Ot              V10.09                             

         

 

           2015                             Ot              V10.43                             

         

 

           2015                             Ot              V44.3                              

        

 

           2015                             Ot              V45.77                             

         

 

           2015                             Ot              V58.69                             

         

 

           2015                             Ot              V67.1                              

        

 

           2015                             Ot              V67.2                              

        

 

           2015                             Ot              311                                

      

 

           2015                             Ot              V10.09                             

         

 

           2015                             Ot              V10.43                             

         

 

           2015                             Ot              V45.77                             

         

 

           2015                             Ot              V58.69                             

         

 

           2015                             Ot              V67.1                              

        

 

           2015                             Ot              V67.2                              

        

 

           2015                             Ot              158.9                              

        

 

           2015                             Ot              V58.69                             

         

 

           2015                             Ot              183.0                              

        

 

           2015                             Ot              233.31                             

         

 

           2015                             Ot              285.9                              

        

 

           2015                             Ot              V10.44                             

         

 

           2015                             Ot              V72.63                             

         

 

           2015                             Ot              281.1                              

        

 

           2015                             Ot              296.80                             

         

 

           2015                             Ot              585.3                              

        

 

           2015                             Ot              V10.09                             

         

 

           2015                             Ot              V10.43                             

         

 

           2015                             Ot              V44.3                              

        

 

           2015                             Ot              V45.77                             

         

 

           2015                             Ot              V58.69                             

         

 

           2015                             Ot              V67.1                              

        

 

           2015                             Ot              V67.2                              

        

 

           2015                             Ot              158.9                              

        

 

           2015                             Ot              V58.69                             

         

 

           2015                             Ot              V58.83                             

         

 

           2015                             Ot              V58.69                             

         

 

           2015                             Ot              V58.83                             

         

 

                2015              JOHN PAULISABELLA S ARNP              Ot              281.1      

                                                             

 

                2015              COKERISABELLA S ARNP              Ot              296.80     

                                                             

 

                2015              COKERISABELLA S ARNP              Ot              585.3      

                                                             

 

                2015              JOHN PAUL ISABELLA S ARNP              Ot              V10.09     

                                                             

 

                2015              JOHN PAUL ISABELLA S ARNP              Ot              V10.43     

                                                             

 

                2015              COKERISABELLA S ARNP              Ot              V44.3      

                                                             

 

                2015              JOHN PAUL ISABELLA S ARNP              Ot              V45.77     

                                                             

 

                2015              JOHN PAUL ISABELLA S ARNP              Ot              V58.69     

                                                             

 

                2015              COKERISABELLA MUSA S ARNP              Ot              V67.1      

                                                             

 

                2015              COKERISABELLA MUSA S ARNP              Ot              V67.2      

                                                             

 

             2015              NATHAN CLEMONS N              Ot              585.3              

                                                 

 

                2015              NATHAN CLEMONS N              Ot              V10.43           

                                                             

 

             2015              NATHAN CLEMONS N              Ot              V44.3              

                                                 

 

                2015              NATHAN CLEMONS N              Ot              V58.69           

                                                             

 

             2015              NATHAN CLEMONS N              Ot              V67.1              

                                                 

 

             2015              NATHAN CLEMONS N              Ot              V67.2              

                                                 

 

                2015              NATHAN CLEMONS N              Ot              V88.01           

                                                             

 

                2015              COKERISABELLA MUSA S ARNP              Ot              585.3      

                                                             

 

                2015              JOHN PAUL ISABELLA S ARNP              Ot              V10.43     

                                                             

 

                2015              JOHN PAUL ISABELLA S ARNP              Ot              V44.3      

                                                             

 

                2015              COKERISABELLA S ARNP              Ot              V58.69     

                                                             

 

                2015              COKER, ISABELLA S ARNP              Ot              V67.1      

                                                             

 

                2015              JOHN PAUL ISABELLA S ARNP              Ot              V67.2      

                                                             

 

                2015              JOHN PAUL ISABELLA S ARNP              Ot              V88.01     

                                                             

 

             2015              NATHAN CLEMONS              Ot              585.3              

                                                 

 

                2015              NATHAN CLEMONS              Ot              V10.43           

                                                             

 

             2015              NATHAN CLEMONS              Ot              V44.3              

                                                 

 

                2015              NATHAN CLEMONS              Ot              V58.69           

                                                             

 

             2015              NATHAN CLEMONS              Ot              V67.1              

                                                 

 

             2015              NATHAN CLEMONS              Ot              V67.2              

                                                 

 

                2015              NATHAN CLEMONS              Ot              V88.01           

                                                             

 

                2015              OTHER, UNLISTED              Ot              V58.69           

                                                             

 

                2015              OTHER, UNLISTED              Ot              V58.83           

                                                             

 

                2015              JOHN PAUL ISABELLA MUSA ARNP              Ot              266.2      

                                                             

 

                2015              JOHN PAUL ISABELLA MUSA ARNP              Ot              311        

                                                             

 

                2015              JOHN PAUL ISABELLA MUSA ARNP              Ot              585.3      

                                                             

 

                2015              JOHN PAUL ISABELLA MUSA ARNP              Ot              V10.43     

                                                             

 

                2015              JOHN PAUL ISABELLA MUSA ARNP              Ot              V44.3      

                                                             

 

                2015              JOHN PAUL ISABELLA MUSA ARNP              Ot              V58.69     

                                                             

 

                2015              JOHN PAUL ISABELLA MUSA ARNP              Ot              V58.81     

                                                             

 

                2015              JOHN PAUL ISABELLA MUSA ARNP              Ot              V67.1      

                                                             

 

                2015              JOHN PAUL ISABELLA MUSA ARNP              Ot              V67.2      

                                                             

 

                2015              JOHN PAUL ISABELLA MUSA ARNP              Ot              V88.01     

                                                             

 

             2015              NATHAN CLEMONS              Ot              F32.9              

                                                 

 

             2015              NATHAN CLEMONS              Ot              N18.3              

                                                 

 

             2015              NATHAN CLEMONS              Ot              Z08                

                                                 

 

                2015              DEONTENATHAN GARCIA              Ot              Z79.899          

                                                             

 

                2015              NATHAN CLEMONS              Ot              Z85.43           

                                                             

 

           2015                             Ot              158.9                              

        

 

           2015                             Ot              V10.09                             

         

 

           2015                             Ot              V10.43                             

         

 

           2015                             Ot              V44.3                              

        

 

           2015                             Ot              V45.77                             

         

 

           2015                             Ot              V58.69                             

         

 

           2015                             Ot              V67.1                              

        

 

           2015                             Ot              V67.2                              

        

 

           2015                             Ot              311                                

      

 

           2015                             Ot              V10.09                             

         

 

           2015                             Ot              V10.43                             

         

 

           2015                             Ot              V45.77                             

         

 

           2015                             Ot              V58.69                             

         

 

           2015                             Ot              V67.1                              

        

 

           2015                             Ot              V67.2                              

        

 

           2015                             Ot              158.9                              

        

 

           2015                             Ot              V58.69                             

         

 

           2015                             Ot              183.0                              

        

 

           2015                             Ot              233.31                             

         

 

           2015                             Ot              285.9                              

        

 

           2015                             Ot              V10.44                             

         

 

           2015                             Ot              V72.63                             

         

 

           2015                             Ot              281.1                              

        

 

           2015                             Ot              296.80                             

         

 

           2015                             Ot              585.3                              

        

 

           2015                             Ot              V10.09                             

         

 

           2015                             Ot              V10.43                             

         

 

           2015                             Ot              V44.3                              

        

 

           2015                             Ot              V45.77                             

         

 

           2015                             Ot              V58.69                             

         

 

           2015                             Ot              V67.1                              

        

 

           2015                             Ot              V67.2                              

        

 

           2015                             Ot              158.9                              

        

 

           2015                             Ot              V58.69                             

         

 

           2015                             Ot              V58.83                             

         

 

           2015                             Ot              V58.69                             

         

 

           2015                             Ot              V58.83                             

         

 

                2015              ISABELLA COKER ARNP              Ot              281.1      

                                                             

 

                2015              ISABELLA COKER ARNP              Ot              296.80     

                                                             

 

                2015              ISABELLA COKER ARNP              Ot              585.3      

                                                             

 

                2015              ISABELLA COKER ARNP              Ot              V10.09     

                                                             

 

                2015              ISABELLA COKER ARNP              Ot              V10.43     

                                                             

 

                2015              ISABELLA COKER ARNP              Ot              V44.3      

                                                             

 

                2015              ISABELLA COKER ARNP              Ot              V45.77     

                                                             

 

                2015              ISABELLA COKER ARNP              Ot              V58.69     

                                                             

 

                2015              ISABELLA COKER ARNP              Ot              V67.1      

                                                             

 

                2015              ISABELLA COKER ARNP              Ot              V67.2      

                                                             

 

             2015              NATHAN CLEMONS              Ot              585.3              

                                                 

 

                2015              NATHAN CLEMONS              Ot              V10.43           

                                                             

 

             2015              NATHAN CLEMONS              Ot              V44.3              

                                                 

 

                2015              NATHAN CLEMONS              Ot              V58.69           

                                                             

 

             2015              NATHAN CLEMONS N              Ot              V67.1              

                                                 

 

             2015              NATHAN CLEMONS N              Ot              V67.2              

                                                 

 

                2015              NATHAN CLEMONS N              Ot              V88.01           

                                                             

 

                2015              COKERISABELLA MUSA S ARNP              Ot              585.3      

                                                             

 

                2015              JOHN PAUL ISABELLA S ARNP              Ot              V10.43     

                                                             

 

                2015              JOHN PAUL ISABELLA S ARNP              Ot              V44.3      

                                                             

 

                2015              COKER ISABELLA S ARNP              Ot              V58.69     

                                                             

 

                2015              COKER ISABELLA S ARNP              Ot              V67.1      

                                                             

 

                2015              COKER ISABELLA S ARNP              Ot              V67.2      

                                                             

 

                2015              COKERISABELLA S ARNP              Ot              V88.01     

                                                             

 

             2015              NAHTAN CLEMONS N              Ot              585.3              

                                                 

 

                2015              NATHAN CLEMONS N              Ot              V10.43           

                                                             

 

             2015              NATHAN CLEMONS N              Ot              V44.3              

                                                 

 

                2015              NATHAN CLEMONS N              Ot              V58.69           

                                                             

 

             2015              NATHAN CLEMONS N              Ot              V67.1              

                                                 

 

             2015              NATHAN CLEMONS N              Ot              V67.2              

                                                 

 

                2015              NATHAN CLEMONS N              Ot              V88.01           

                                                             

 

                2015              OTHER, UNLISTED              Ot              V58.69           

                                                             

 

                2015              OTHER, UNLISTED              Ot              V58.83           

                                                             

 

                2015              COKERISABELLA MUSA S ARNP              Ot              266.2      

                                                             

 

                2015              JOHN PAUL ISABELLA S ARNP              Ot              311        

                                                             

 

                2015              JOHN PAUL ISABELLA S ARNP              Ot              585.3      

                                                             

 

                2015              COKER ISABELLA S ARNP              Ot              V10.43     

                                                             

 

                2015              COKER ISABELLA S ARNP              Ot              V44.3      

                                                             

 

                2015              COKER ISABELLA S ARNP              Ot              V58.69     

                                                             

 

                2015              COKER ISABELLA S ARNP              Ot              V58.81     

                                                             

 

                2015              COKER ISABELLA S ARNP              Ot              V67.1      

                                                             

 

                2015              COKER ISABELLA S ARNP              Ot              V67.2      

                                                             

 

                2015              COKER, ISABELLA S ARNP              Ot              V88.01     

                                                             

 

             2015              DEONTE, BOBAN N              Ot              F32.9              

                                                 

 

             2015              NATHAN CLEMONS              Ot              N18.3              

                                                 

 

             2015              NATHAN CLEMONS              Ot              Z08                

                                                 

 

                2015              NATHAN CLEMONS              Ot              Z79.899          

                                                             

 

                2015              NATHAN CLEMONS              Ot              Z85.43           

                                                             

 

           2015                             Ot              158.9                              

        

 

           2015                             Ot              V10.09                             

         

 

           2015                             Ot              V10.43                             

         

 

           2015                             Ot              V44.3                              

        

 

           2015                             Ot              V45.77                             

         

 

           2015                             Ot              V58.69                             

         

 

           2015                             Ot              V67.1                              

        

 

           2015                             Ot              V67.2                              

        

 

           2015                             Ot              311                                

      

 

           2015                             Ot              V10.09                             

         

 

           2015                             Ot              V10.43                             

         

 

           2015                             Ot              V45.77                             

         

 

           2015                             Ot              V58.69                             

         

 

           2015                             Ot              V67.1                              

        

 

           2015                             Ot              V67.2                              

        

 

           2015                             Ot              158.9                              

        

 

           2015                             Ot              V58.69                             

         

 

           2015                             Ot              183.0                              

        

 

           2015                             Ot              233.31                             

         

 

           2015                             Ot              285.9                              

        

 

           2015                             Ot              V10.44                             

         

 

           2015                             Ot              V72.63                             

         

 

           2015                             Ot              281.1                              

        

 

           2015                             Ot              296.80                             

         

 

           2015                             Ot              585.3                              

        

 

           2015                             Ot              V10.09                             

         

 

           2015                             Ot              V10.43                             

         

 

           2015                             Ot              V44.3                              

        

 

           2015                             Ot              V45.77                             

         

 

           2015                             Ot              V58.69                             

         

 

           2015                             Ot              V67.1                              

        

 

           2015                             Ot              V67.2                              

        

 

           2015                             Ot              158.9                              

        

 

           2015                             Ot              V58.69                             

         

 

           2015                             Ot              V58.83                             

         

 

           2015                             Ot              V58.69                             

         

 

           2015                             Ot              V58.83                             

         

 

                2015              ISABELLA COKER ARNP              Ot              281.1      

                                                             

 

                2015              ISABELLA COKERP              Ot              296.80     

                                                             

 

                2015              ISABELLA COKER ARNP              Ot              585.3      

                                                             

 

                2015              ISABELLA COKER ARNP              Ot              V10.09     

                                                             

 

                2015              ISABELLA COKER S ARNP              Ot              V10.43     

                                                             

 

                2015              ISABELLA COKER S ARNP              Ot              V44.3      

                                                             

 

                2015              ISABELLA COKER S ARNP              Ot              V45.77     

                                                             

 

                2015              ISABELLA COKER S ARNP              Ot              V58.69     

                                                             

 

                2015              ISABELLA COKER S ARNP              Ot              V67.1      

                                                             

 

                2015              ISABELLA COKER S ARNP              Ot              V67.2      

                                                             

 

             2015              DEONTENATHAN GARCIA N              Ot              585.3              

                                                 

 

                2015              DEONTELB GARCIAAN N              Ot              V10.43           

                                                             

 

             2015              DEONTE, BOBAN N              Ot              V44.3              

                                                 

 

                2015              DEONTE, BOBAN N              Ot              V58.69           

                                                             

 

             2015              DEONTE, BOBAN N              Ot              V67.1              

                                                 

 

             2015              DEONTE, BOBAN N              Ot              V67.2              

                                                 

 

                2015              DEONTE, BOBAN N              Ot              V88.01           

                                                             

 

                2015              ISABELLA COKER S ARNP              Ot              585.3      

                                                             

 

                2015              ISABELLA COKER S ARNP              Ot              V10.43     

                                                             

 

                2015              ISABELLA COKER S ARNP              Ot              V44.3      

                                                             

 

                2015              ISABELLA COKER S ARNP              Ot              V58.69     

                                                             

 

                2015              ISABELLA COKER S ARNP              Ot              V67.1      

                                                             

 

                2015              ISABELLA COKER S ARNP              Ot              V67.2      

                                                             

 

                2015              ISABELLA COKER S ARNP              Ot              V88.01     

                                                             

 

             2015              DEONTELB GARCIAAN N              Ot              585.3              

                                                 

 

                2015              DEONTE, BOBAN N              Ot              V10.43           

                                                             

 

             2015              DEONTE, BOBAN N              Ot              V44.3              

                                                 

 

                2015              DEONTE, BOBAN N              Ot              V58.69           

                                                             

 

             2015              DEONTE, BOBAN N              Ot              V67.1              

                                                 

 

             2015              DEONTE, BOBAN N              Ot              V67.2              

                                                 

 

                2015              DEONTE, BOBAN N              Ot              V88.01           

                                                             

 

                2015              OTHER, UNLISTED              Ot              V58.69           

                                                             

 

                2015              OTHER, UNLISTED              Ot              V58.83           

                                                             

 

                2015              COKERISABELLA MUSA S ARNP              Ot              266.2      

                                                             

 

                2015              ISABELLA COKER ARNP              Ot              311        

                                                             

 

                2015              ISABELLA COKER ARNP              Ot              585.3      

                                                             

 

                2015              ISABELLA COKER S ARNP              Ot              V10.43     

                                                             

 

                2015              ISABELLA COKER S ARNP              Ot              V44.3      

                                                             

 

                2015              ISABELLA COKER S ARNP              Ot              V58.69     

                                                             

 

                2015              ISABELLA COKER S ARNP              Ot              V58.81     

                                                             

 

                2015              ISABELLA COKER S ARNP              Ot              V67.1      

                                                             

 

                2015              ISABELLA COKER ARNP              Ot              V67.2      

                                                             

 

                2015              ISABELLA COKER S ARNP              Ot              V88.01     

                                                             

 

             2015              DEONTE, BOBAN N              Ot              F32.9              

                                                 

 

             2015              DEONTE, BOBAN N              Ot              N18.3              

                                                 

 

             2015              DEONTE, BOBAN N              Ot              Z08                

                                                 

 

                2015              DEONTE, BOBAN N              Ot              Z79.899          

                                                             

 

                2015              DEONTE, BOBAN N              Ot              Z85.43           

                                                             

 

             2015              DEONTE, BOBAN N              Ot              F32.9              

                                                 

 

             2015              DEONTE, BOBAN N              Ot              N18.3              

                                                 

 

             2015              DEONTE, BOBAN N              Ot              Z08                

                                                 

 

                2015              DEONTE, BOBAN N              Ot              Z79.899          

                                                             

 

                2015              DEONTE, BOBAN N              Ot              Z85.43           

                                                             

 

             2016              DEONTE, BOBAN N              Ot              F32.9              

                                                 

 

             2016              DEONTE, BOBAN N              Ot              N18.3              

                                                 

 

             2016              DEONTE, BOBAN N              Ot              Z08                

                                                 

 

                2016              DEONTE, BOBAN N              Ot              Z79.899          

                                                             

 

                2016              DEONTE, BOBAN N              Ot              Z85.43           

                                                             

 

             2016              DEONTE, BOBAN N              Ot              F32.9              

                                                 

 

             2016              DEONTE, BOBAN N              Ot              N18.3              

                                                 

 

             2016              DEONTE, BOBAN N              Ot              Z08                

                                                 

 

                2016              DEONTE, BOBAN N              Ot              Z79.899          

                                                             

 

                2016              DEONTE, BOBAN N              Ot              Z85.43           

                                                             

 

             2016              DEONTE, BOBAN N              Ot              F32.9              

                                                 

 

             2016              DEONTE, BOBAN N              Ot              N18.3              

                                                 

 

             2016              DEONTENATHAN GARCIA N              Ot              Z08                

                                                 

 

                2016              DEONTE, NATHAN N              Ot              Z79.899          

                                                             

 

                2016              DENOTE NATHAN N              Ot              Z85.43           

                                                             

 

             2016              DEONTE LBYA N              Ot              Z08                

                                                 

 

             2016              NATHAN CLEMONS N              Ot              Z45.2              

                                                 

 

                2016              DEONTE NATHAN N              Ot              Z85.43           

                                                             

 

             04/15/2016              DEONTE LBYA FIORELLA              Ot              F32.9              

MAJOR DEPRESSIVE DISORDER, SINGLE EPISOD                       

 

             04/15/2016              NATHAN CLEMONS FIORELLA              Ot              N18.3              

CHRONIC KIDNEY DISEASE, STAGE 3 (MODERAT                       

 

             04/15/2016              DEONTE NATHAN N              Ot              Z08              ENCNTR

 FOR FOLLOW-UP EXAM AFTER TRTMT FO                       

 

                04/15/2016              DEONTE NATHAN N              Ot              Z79.899          

                          OTHER LONG TERM (CURRENT) DRUG THERAPY                       

 

                04/15/2016              DEONTENATHAN N              Ot              Z85.43           

                          PERSONAL HISTORY OF MALIGNANT NEOPLASM O                       

 

                2016              ISABELLA COKER ARNP              Ot              C56.9      

                          MALIGNANT NEOPLASM OF UNSPECIFIED OVARY                       

 

             2016              DEONTENATHAN N              Ot              Z08              ENCNTR

 FOR FOLLOW-UP EXAM AFTER TRTMT FO                       

 

             2016              DEONTENATHAN              Ot              Z45.2              

ENCOUNTER FOR ADJUSTMENT AND MANAGEMENT                        

 

                2016              DEONTE, NATHAN N              Ot              Z85.43           

                          PERSONAL HISTORY OF MALIGNANT NEOPLASM O                       

 

                2016              ISABELLA COKER ARNP              Ot              C56.9      

                          MALIGNANT NEOPLASM OF UNSPECIFIED OVARY                       

 

                2016              ISABELLA COKER ARNP              Ot              C56.9      

                          MALIGNANT NEOPLASM OF UNSPECIFIED OVARY                       

 

             2016              DEONTENATHAN N              Ot              Z08              ENCNTR

 FOR FOLLOW-UP EXAM AFTER TRTMT FO                       

 

             2016              DEONTENATHAN N              Ot              Z45.2              

ENCOUNTER FOR ADJUSTMENT AND MANAGEMENT                        

 

                2016              NATHAN CLEMONS N              Ot              Z85.43           

                          PERSONAL HISTORY OF MALIGNANT NEOPLASM O                       

 

             2016              DEONTENATHAN N              Ot              Z08              ENCNTR

 FOR FOLLOW-UP EXAM AFTER TRTMT FO                       

 

                2016              DEONTENATHAN N              Ot              Z79.899          

                          OTHER LONG TERM (CURRENT) DRUG THERAPY                       

 

                2016              NATHAN CLEMONS N              Ot              Z85.43           

                          PERSONAL HISTORY OF MALIGNANT NEOPLASM O                       

 

             08/15/2016              DEONTE, NATHAN N              Ot              Z08              ENCNTR

 FOR FOLLOW-UP EXAM AFTER TRTMT FO                       

 

                08/15/2016              DEONTENATHAN              Ot              Z79.899          

                          OTHER LONG TERM (CURRENT) DRUG THERAPY                       

 

                08/15/2016              NATHAN CLEMONS N              Ot              Z85.43           

                          PERSONAL HISTORY OF MALIGNANT NEOPLASM O                       

 

             2016              DEONTENATHAN              Ot              Z08              ENCNTR

 FOR FOLLOW-UP EXAM AFTER TRTMT FO                       

 

             2016              NATHAN CLEMONS              Ot              Z45.2              

ENCOUNTER FOR ADJUSTMENT AND MANAGEMENT                        

 

                2016              DEONTENATHAN N              Ot              Z85.43           

                          PERSONAL HISTORY OF MALIGNANT NEOPLASM O                       

 

             2016              NATHAN CLEMONS N              Ot              Z08              ENCNTR

 FOR FOLLOW-UP EXAM AFTER TRTMT FO                       

 

             2016              NATHAN CLEMONS N              Ot              Z45.2              

ENCOUNTER FOR ADJUSTMENT AND MANAGEMENT                        

 

                2016              NATHAN CLEMONS N              Ot              Z85.43           

                          PERSONAL HISTORY OF MALIGNANT NEOPLASM O                       

 

             09/15/2016              NATHAN CLEMONS N              Ot              Z08              ENCNTR

 FOR FOLLOW-UP EXAM AFTER TRTMT FO                       

 

             09/15/2016              NATHAN CLEMONS              Ot              Z45.2              

ENCOUNTER FOR ADJUSTMENT AND MANAGEMENT                        

 

                09/15/2016              NATHAN CLEMONS N              Ot              Z85.43           

                          PERSONAL HISTORY OF MALIGNANT NEOPLASM O                       

 

             2016              NATHAN CLEMONS N              Ot              Z08              ENCNTR

 FOR FOLLOW-UP EXAM AFTER TRTMT FO                       

 

             2016              NATHAN CLEMONS N              Ot              Z45.2              

ENCOUNTER FOR ADJUSTMENT AND MANAGEMENT                        

 

                2016              NATHAN CLEMONS N              Ot              Z85.43           

                          PERSONAL HISTORY OF MALIGNANT NEOPLASM O                       

 

             2016              NATHAN CLEMONS N              Ot              Z08              ENCNTR

 FOR FOLLOW-UP EXAM AFTER TRTMT FO                       

 

             2016              NATHAN CLEMONS N              Ot              Z45.2              

ENCOUNTER FOR ADJUSTMENT AND MANAGEMENT                        

 

                2016              NATHAN CLEMONS N              Ot              Z85.43           

                          PERSONAL HISTORY OF MALIGNANT NEOPLASM O                       

 

             2017              NATHAN CLEMONS N              Ot              Z08              ENCNTR

 FOR FOLLOW-UP EXAM AFTER TRTMT FO                       

 

             2017              NATHAN CLEMONS N              Ot              Z45.2              

ENCOUNTER FOR ADJUSTMENT AND MANAGEMENT                        

 

                2017              NATHAN CLEMONS N              Ot              Z85.43           

                          PERSONAL HISTORY OF MALIGNANT NEOPLASM O                       

 

                2017              JOHN PAUL ISABELLA MUSA ARNP              Ot              C56.9      

                          MALIGNANT NEOPLASM OF UNSPECIFIED OVARY                       

 

                2017              COKERISABELLA MUSA ARNP              Ot              C56.9      

                          MALIGNANT NEOPLASM OF UNSPECIFIED OVARY                       

 

             2017                             Ot              158.9              MAL MEGHA PERITONEUM

 NOS                                             

 

             2017                             Ot              V58.69              OTH MED,LT,CURRENT

 USE                                             

 

             2017                             Ot              V58.83              ENCOUNTER FOR

 THERAPEUTIC DRUG MONITORIN                       

 

             2017                             Ot              V58.69              OTH MED,LT,CURRENT

 USE                                             

 

             2017                             Ot              V58.83              ENCOUNTER FOR

 THERAPEUTIC DRUG MONITORIN                       

 

                2017              JOHN PAUL ISABELLA MUSA ARNP              Ot              281.1      

                          B12 DEFIC ANEMIA NEC                       

 

                2017              JULIO COKERMICHAEL S ARNP              Ot              296.80     

                          BIPOLAR DISORDER, UNSPECIFIED                       

 

                2017              ISABELLA COKER ARNP              Ot              585.3      

                          CHRONIC KIDNEY DISEASE, STAGE III (MODER                       

 

                2017              ISABELLA COKER ARNP              Ot              V10.09     

                          HX OF GI MALIGNANCY NEC                       

 

                2017              ISABELLA COKER ARNP              Ot              V10.43     

                          HX OF OVARIAN MALIGNANCY                       

 

                2017              ISABELLA COKER ARNP              Ot              V44.3      

                          COLOSTOMY STATUS                       

 

                2017              JULIO COKERMICHAEL S ARNP              Ot              V45.77     

                          ACQRD ABSENCE OF GENITAL ORGANS                       

 

                2017              ISABELLA COKER ARNP              Ot              V58.69     

                          OTH MED,LT,CURRENT USE                       

 

                2017              JULIO COKERMICHAEL S ARNP              Ot              V67.1      

                          RADIOTHERAPY FOLLOW-UP                       

 

                2017              ISABELLA COKER ARNP              Ot              V67.2      

                          CHEMOTHERAPY FOLLOW-UP                       

 

             2017              NATHAN CLEMONS              Ot              585.3              

CHRONIC KIDNEY DISEASE, STAGE III (MODER                       

 

                2017              NATHAN CLEMONS              Ot              V10.43           

                          HX OF OVARIAN MALIGNANCY                       

 

             2017              NATHAN CLEMONS              Ot              V44.3              

COLOSTOMY STATUS                                 

 

                2017              NATHAN CLEMONS              Ot              V58.69           

                          OTH MED,LT,CURRENT USE                       

 

             2017              NATHNA CLEMONS              Ot              V67.1              

RADIOTHERAPY FOLLOW-UP                           

 

             2017              NATHAN CLEMONS              Ot              V67.2              

CHEMOTHERAPY FOLLOW-UP                           

 

                2017              NATHAN CLEMONS              Ot              V88.01           

                          ACQUIRED ABSENCE OF BOTH CERVIX AND UTER                       

 

                2017              ISABELLA COKER ARNP              Ot              585.3      

                          CHRONIC KIDNEY DISEASE, STAGE III (MODER                       

 

                2017              ISABELLA COKERP              Ot              V10.43     

                          HX OF OVARIAN MALIGNANCY                       

 

                2017              ISABELLA COKER              Ot              V44.3      

                          COLOSTOMY STATUS                       

 

                2017              ISABELLA COKER              Ot              V58.69     

                          OTH MED,LT,CURRENT USE                       

 

                2017              ISABELLA COKER ARNP              Ot              V67.1      

                          RADIOTHERAPY FOLLOW-UP                       

 

                2017              ISABELLA COKER ARNP              Ot              V67.2      

                          CHEMOTHERAPY FOLLOW-UP                       

 

                2017              ISABELLA COKER ARNP              Ot              V88.01     

                          ACQUIRED ABSENCE OF BOTH CERVIX AND UTER                       

 

             2017              NATHAN CLEMONS FIORELLA              Ot              585.3              

CHRONIC KIDNEY DISEASE, STAGE III (MODER                       

 

                2017              NATHAN CLEMONS FIORELLA              Ot              V10.43           

                          HX OF OVARIAN MALIGNANCY                       

 

             2017              NATHAN CLEMONS FIORELLA              Ot              V44.3              

COLOSTOMY STATUS                                 

 

                2017              NATHAN CLEMONS              Ot              V58.69           

                          OTH MED,LT,CURRENT USE                       

 

             2017              NATHAN CLEMONS FIORELLA              Ot              V67.1              

RADIOTHERAPY FOLLOW-UP                           

 

             2017              NATHAN CLEMONS FIORELLA              Ot              V67.2              

CHEMOTHERAPY FOLLOW-UP                           

 

                2017              NATHAN CLEMONS FIORELLA              Ot              V88.01           

                          ACQUIRED ABSENCE OF BOTH CERVIX AND UTER                       

 

                2017              OTHER, UNLISTED              Ot              V58.69           

                          OTH MED,LT,CURRENT USE                       

 

                2017              OTHER, UNLISTED              Ot              V58.83           

                          ENCOUNTER FOR THERAPEUTIC DRUG MONITORIN                       

 

                2017              ISABELLA COKER ARNP              Ot              266.2      

                          B-COMPLEX DEFIC NEC                       

 

                2017              ISABELLA COKER ARNP              Ot              311        

                          DEPRESSIVE DISORDER NEC                       

 

                2017              ISABELLA COKER ARNP              Ot              585.3      

                          CHRONIC KIDNEY DISEASE, STAGE III (MODER                       

 

                2017              ISABELLA COKER ARNP              Ot              V10.43     

                          HX OF OVARIAN MALIGNANCY                       

 

                2017              ISABELLA COKER ARNP              Ot              V44.3      

                          COLOSTOMY STATUS                       

 

                2017              ISABELLA COKER              Ot              V58.69     

                          OTH MED,LT,CURRENT USE                       

 

                2017              ISABELLA COKER ARNP              Ot              V58.81     

                          FIT/ADJ VASCULAR CATHETER                       

 

                2017              ISABELLA COKER ARNP              Ot              V67.1      

                          RADIOTHERAPY FOLLOW-UP                       

 

                2017              ISABELLA COKER ARNP              Ot              V67.2      

                          CHEMOTHERAPY FOLLOW-UP                       

 

                2017              ISABELLA COKER ARNP              Ot              V88.01     

                          ACQUIRED ABSENCE OF BOTH CERVIX AND UTER                       

 

             2017              DEONTENATHAN              Ot              F32.9              

MAJOR DEPRESSIVE DISORDER, SINGLE EPISOD                       

 

             2017              DEONTE, NATHAN N              Ot              N18.3              

CHRONIC KIDNEY DISEASE, STAGE 3 (MODERAT                       

 

             2017              NATHAN CLEMONS N              Ot              Z08              ENCNTR

 FOR FOLLOW-UP EXAM AFTER TRTMT FO                       

 

                2017              NATHAN CLEMONS N              Ot              Z79.899          

                          OTHER LONG TERM (CURRENT) DRUG THERAPY                       

 

                2017              NATHAN CLEMONS N              Ot              Z85.43           

                          PERSONAL HISTORY OF MALIGNANT NEOPLASM O                       

 

                2017              ISABELLA COKER ARNP              Ot              C56.9      

                          MALIGNANT NEOPLASM OF UNSPECIFIED OVARY                       

 

             2017              NATHAN CLEMONS N              Ot              Z08              ENCNTR

 FOR FOLLOW-UP EXAM AFTER TRTMT FO                       

 

                2017              NATHAN CLEMONS N              Ot              Z79.899          

                          OTHER LONG TERM (CURRENT) DRUG THERAPY                       

 

                2017              NATHAN CLEMONS N              Ot              Z85.43           

                          PERSONAL HISTORY OF MALIGNANT NEOPLASM O                       

 

             2017              NATHAN CLEMONS N              Ot              Z08              ENCNTR

 FOR FOLLOW-UP EXAM AFTER TRTMT FO                       

 

             2017              NATHAN CLEMONS              Ot              Z45.2              

ENCOUNTER FOR ADJUSTMENT AND MANAGEMENT                        

 

                2017              NATHAN CLEMONS N              Ot              Z85.43           

                          PERSONAL HISTORY OF MALIGNANT NEOPLASM O                       

 

             2017              NATHAN CLEMONS              Ot              F32.9              

MAJOR DEPRESSIVE DISORDER, SINGLE EPISOD                       

 

             2017              NATHAN CLEMONS N              Ot              N18.3              

CHRONIC KIDNEY DISEASE, STAGE 3 (MODERAT                       

 

             2017              NATHAN CLEMONS N              Ot              Z08              ENCNTR

 FOR FOLLOW-UP EXAM AFTER TRTMT FO                       

 

                2017              NATHAN CLEMONS N              Ot              Z79.899          

                          OTHER LONG TERM (CURRENT) DRUG THERAPY                       

 

                2017              NATHAN CLEMONS N              Ot              Z85.43           

                          PERSONAL HISTORY OF MALIGNANT NEOPLASM O                       

 

             2017              NATHAN CLEMONS N              Ot              F32.9              

MAJOR DEPRESSIVE DISORDER, SINGLE EPISOD                       

 

             2017              NATHAN CLEMONS N              Ot              N18.3              

CHRONIC KIDNEY DISEASE, STAGE 3 (MODERAT                       

 

             2017              NATHAN CLEMONS N              Ot              Z08              ENCNTR

 FOR FOLLOW-UP EXAM AFTER TRTMT FO                       

 

                2017              NATHAN CLEMONS N              Ot              Z79.899          

                          OTHER LONG TERM (CURRENT) DRUG THERAPY                       

 

                2017              NATHAN CLEMONS N              Ot              Z85.43           

                          PERSONAL HISTORY OF MALIGNANT NEOPLASM O                       

 

             2017              NATHAN CLEMONS N              Ot              F32.9              

MAJOR DEPRESSIVE DISORDER, SINGLE EPISOD                       

 

             2017              NATHAN CLEMONS N              Ot              N18.3              

CHRONIC KIDNEY DISEASE, STAGE 3 (MODERAT                       

 

             2017              NATHAN CLEMONS N              Ot              Z08              ENCNTR

 FOR FOLLOW-UP EXAM AFTER TRTMT FO                       

 

                2017              NATHAN CLEMONS N              Ot              Z79.899          

                          OTHER LONG TERM (CURRENT) DRUG THERAPY                       

 

                2017              NATHAN CLEMONS N              Ot              Z85.43           

                          PERSONAL HISTORY OF MALIGNANT NEOPLASM O                       

 

             2017              NATHAN CLEMONS N              Ot              F32.9              

MAJOR DEPRESSIVE DISORDER, SINGLE EPISOD                       

 

             2017              NATHAN CLEMONS N              Ot              N18.3              

CHRONIC KIDNEY DISEASE, STAGE 3 (MODERAT                       

 

             2017              NATHAN CLEMONS N              Ot              Z08              ENCNTR

 FOR FOLLOW-UP EXAM AFTER TRTMT FO                       

 

                2017              NATHAN CLEMONS N              Ot              Z79.899          

                          OTHER LONG TERM (CURRENT) DRUG THERAPY                       

 

                2017              NATHAN CLEMONS N              Ot              Z85.43           

                          PERSONAL HISTORY OF MALIGNANT NEOPLASM O                       

 

             2017                             Ot              158.9              MAL MEGHA PERITONEUM

 NOS                                             

 

             2017                             Ot              V58.69              OTH MED,LT,CURRENT

 USE                                             

 

             2017                             Ot              V58.83              ENCOUNTER FOR

 THERAPEUTIC DRUG MONITORIN                       

 

             2017                             Ot              V58.69              OTH MED,LT,CURRENT

 USE                                             

 

             2017                             Ot              V58.83              ENCOUNTER FOR

 THERAPEUTIC DRUG MONITORIN                       

 

                2017              ISABELLA COKER              Ot              281.1      

                          B12 DEFIC ANEMIA NEC                       

 

                2017              ISABELLA COKER ARNP              Ot              296.80     

                          BIPOLAR DISORDER, UNSPECIFIED                       

 

                2017              JOHN PAUL HILAH S ARNP              Ot              585.3      

                          CHRONIC KIDNEY DISEASE, STAGE III (MODER                       

 

                2017              JULIO COKERMICHAEL S ARNP              Ot              V10.09     

                          HX OF GI MALIGNANCY NEC                       

 

                2017              JOHN PAULISABELLA S ARNP              Ot              V10.43     

                          HX OF OVARIAN MALIGNANCY                       

 

                2017              JOHN PAUL ISABELLA S ARNP              Ot              V44.3      

                          COLOSTOMY STATUS                       

 

                2017              JULIO COKERMICHAEL S ARNP              Ot              V45.77     

                          ACQRD ABSENCE OF GENITAL ORGANS                       

 

                2017              JOHN PAULISABELLA ARNP              Ot              V58.69     

                          OTH MED,LT,CURRENT USE                       

 

                2017              JULIO COKERMICHAEL S ARNP              Ot              V67.1      

                          RADIOTHERAPY FOLLOW-UP                       

 

                2017              ISABELLA COKER ARNP              Ot              V67.2      

                          CHEMOTHERAPY FOLLOW-UP                       

 

             2017              NATHAN CLEMONS N              Ot              585.3              

CHRONIC KIDNEY DISEASE, STAGE III (MODER                       

 

                2017              NATHAN CLEMONS N              Ot              V10.43           

                          HX OF OVARIAN MALIGNANCY                       

 

             2017              NATHAN CLEMONS N              Ot              V44.3              

COLOSTOMY STATUS                                 

 

                2017              NATHAN CLEMONS              Ot              V58.69           

                          OTH MED,LT,CURRENT USE                       

 

             2017              NATHAN CLEMONS              Ot              V67.1              

RADIOTHERAPY FOLLOW-UP                           

 

             2017              NATHAN CLEMONS N              Ot              V67.2              

CHEMOTHERAPY FOLLOW-UP                           

 

                2017              NATHAN CLEMONS              Ot              V88.01           

                          ACQUIRED ABSENCE OF BOTH CERVIX AND UTER                       

 

                2017              ISABELLA COKER ARNP              Ot              585.3      

                          CHRONIC KIDNEY DISEASE, STAGE III (MODER                       

 

                2017              JULIO COKERMICHAEL S ARNP              Ot              V10.43     

                          HX OF OVARIAN MALIGNANCY                       

 

                2017              ISABELLA COKER ARNP              Ot              V44.3      

                          COLOSTOMY STATUS                       

 

                2017              ISABELLA COKER ARNP              Ot              V58.69     

                          OTH MED,LT,CURRENT USE                       

 

                2017              JULIO COKERMICHAEL S ARNP              Ot              V67.1      

                          RADIOTHERAPY FOLLOW-UP                       

 

                2017              ISABELLA COKER ARNP              Ot              V67.2      

                          CHEMOTHERAPY FOLLOW-UP                       

 

                2017              ISABELLA COKER ARNP              Ot              V88.01     

                          ACQUIRED ABSENCE OF BOTH CERVIX AND UTER                       

 

             2017              DEONTE NATHAN BARROS              Ot              585.3              

CHRONIC KIDNEY DISEASE, STAGE III (MODER                       

 

                2017              DEONTE NATHAN BARROS              Ot              V10.43           

                          HX OF OVARIAN MALIGNANCY                       

 

             2017              DEONTE NATHAN BARROS              Ot              V44.3              

COLOSTOMY STATUS                                 

 

                2017              NATHAN CLEMONS FIORELLA              Ot              V58.69           

                          OTH MED,LT,CURRENT USE                       

 

             2017              DEONTENATHAN GARCIA              Ot              V67.1              

RADIOTHERAPY FOLLOW-UP                           

 

             2017              NATHAN CLEMONS              Ot              V67.2              

CHEMOTHERAPY FOLLOW-UP                           

 

                2017              DEONTE NATHAN BARROS              Ot              V88.01           

                          ACQUIRED ABSENCE OF BOTH CERVIX AND UTER                       

 

                2017              OTHER, UNLISTED              Ot              V58.69           

                          OTH MED,LT,CURRENT USE                       

 

                2017              OTHER, UNLISTED              Ot              V58.83           

                          ENCOUNTER FOR THERAPEUTIC DRUG MONITORIN                       

 

                2017              ISABELLA COKER ARNP              Ot              266.2      

                          B-COMPLEX DEFIC NEC                       

 

                2017              ISABELLA COKER ARNP              Ot              311        

                          DEPRESSIVE DISORDER NEC                       

 

                2017              ISABELLA COKER ARNP              Ot              585.3      

                          CHRONIC KIDNEY DISEASE, STAGE III (MODER                       

 

                2017              ISABELLA COKER ARNP              Ot              V10.43     

                          HX OF OVARIAN MALIGNANCY                       

 

                2017              ISABELLA COKER ARNP              Ot              V44.3      

                          COLOSTOMY STATUS                       

 

                2017              ISABELLA COKER              Ot              V58.69     

                          OTH MED,LT,CURRENT USE                       

 

                2017              ISABELLA COKER ARNP              Ot              V58.81     

                          FIT/ADJ VASCULAR CATHETER                       

 

                2017              ISABELLA COKER ARNP              Ot              V67.1      

                          RADIOTHERAPY FOLLOW-UP                       

 

                2017              ISABELLA COKER ARNP              Ot              V67.2      

                          CHEMOTHERAPY FOLLOW-UP                       

 

                2017              ISABELLA COKER ARNP              Ot              V88.01     

                          ACQUIRED ABSENCE OF BOTH CERVIX AND UTER                       

 

             2017              NATHAN CLEMONS              Ot              F32.9              

MAJOR DEPRESSIVE DISORDER, SINGLE EPISOD                       

 

             2017              NATHAN CLEMONS              Ot              N18.3              

CHRONIC KIDNEY DISEASE, STAGE 3 (MODERAT                       

 

             2017              NATHAN CLEMONS              Ot              Z08              ENCNTR

 FOR FOLLOW-UP EXAM AFTER TRTMT FO                       

 

                2017              DEONTE, BOBAN N              Ot              Z79.899          

                          OTHER LONG TERM (CURRENT) DRUG THERAPY                       

 

                2017              DEONTENATHAN GARCIA N              Ot              Z85.43           

                          PERSONAL HISTORY OF MALIGNANT NEOPLASM O                       

 

                2017              ISABELLA COKER ARNTREMAYNE              Ot              C56.9      

                          MALIGNANT NEOPLASM OF UNSPECIFIED OVARY                       

 

             2017              DEONTENATHAN GARCIA N              Ot              Z08              ENCNTR

 FOR FOLLOW-UP EXAM AFTER TRTMT FO                       

 

             2017              NATHAN CLEMONS N              Ot              Z45.2              

ENCOUNTER FOR ADJUSTMENT AND MANAGEMENT                        

 

                2017              DEONTENATHAN N              Ot              Z85.43           

                          PERSONAL HISTORY OF MALIGNANT NEOPLASM O                       

 

             2017              DEONTENATHAN N              Ot              F32.9              

MAJOR DEPRESSIVE DISORDER, SINGLE EPISOD                       

 

             2017              DEONTENATHAN GARCIA N              Ot              N18.3              

CHRONIC KIDNEY DISEASE, STAGE 3 (MODERAT                       

 

             2017              DEONTENATHAN GARCIA N              Ot              Z08              ENCNTR

 FOR FOLLOW-UP EXAM AFTER TRTMT FO                       

 

                2017              NATHAN CLEMONS N              Ot              Z79.899          

                          OTHER LONG TERM (CURRENT) DRUG THERAPY                       

 

                2017              DEONTENATHAN GARCIA N              Ot              Z85.43           

                          PERSONAL HISTORY OF MALIGNANT NEOPLASM O                       

 

             2018              DEONTENATHAN N              Ot              F32.9              

MAJOR DEPRESSIVE DISORDER, SINGLE EPISOD                       

 

             2018              DEONTE, BOBYA N              Ot              N18.3              

CHRONIC KIDNEY DISEASE, STAGE 3 (MODERAT                       

 

             2018              DEONTE BOBYA N              Ot              Z08              ENCNTR

 FOR FOLLOW-UP EXAM AFTER TRTMT FO                       

 

                2018              NATHAN CLEMONS N              Ot              Z79.899          

                          OTHER LONG TERM (CURRENT) DRUG THERAPY                       

 

                2018              DEONTENATHAN GARCIA N              Ot              Z85.43           

                          PERSONAL HISTORY OF MALIGNANT NEOPLASM O                       

 

             2018              DEONTENATHAN N              Ot              F32.9              

MAJOR DEPRESSIVE DISORDER, SINGLE EPISOD                       

 

             2018              DEONTENATHAN N              Ot              N18.3              

CHRONIC KIDNEY DISEASE, STAGE 3 (MODERAT                       

 

             2018              DEONTE, BOBYA N              Ot              Z08              ENCNTR

 FOR FOLLOW-UP EXAM AFTER TRTMT FO                       

 

             2018              DEONTENATHAN GARCIA N              Ot              Z45.2              

ENCOUNTER FOR ADJUSTMENT AND MANAGEMENT                        

 

                2018              DEONTENATHAN N              Ot              Z79.899          

                          OTHER LONG TERM (CURRENT) DRUG THERAPY                       

 

                2018              NATHAN CLEMONS              Ot              Z85.43           

                          PERSONAL HISTORY OF MALIGNANT NEOPLASM O                       

 

             2018              NATHAN CLEMONS              Ot              F32.9              

MAJOR DEPRESSIVE DISORDER, SINGLE EPISOD                       

 

             2018              NATHAN CLEMONS              Ot              N18.3              

CHRONIC KIDNEY DISEASE, STAGE 3 (MODERAT                       

 

             2018              NATHAN CLEMONS              Ot              Z08              ENCNTR

 FOR FOLLOW-UP EXAM AFTER TRTMT FO                       

 

             2018              NATHAN CLEMONS              Ot              Z45.2              

ENCOUNTER FOR ADJUSTMENT AND MANAGEMENT                        

 

                2018              NATHAN CLEMONS              Ot              Z79.899          

                          OTHER LONG TERM (CURRENT) DRUG THERAPY                       

 

                2018              NATHAN CLEMONS              Ot              Z85.43           

                          PERSONAL HISTORY OF MALIGNANT NEOPLASM O                       

 

                2019              ISABELLA COKER ARNP              Ot              585.3      

                          CHRONIC KIDNEY DISEASE, STAGE III (MODER                       

 

                2019              ISABELLA COKER ARNP              Ot              V10.43     

                          HX OF OVARIAN MALIGNANCY                       

 

                2019              ISABELLA COKER ARNP              Ot              V44.3      

                          COLOSTOMY STATUS                       

 

                2019              ISABELLA COKERP              Ot              V58.69     

                          OTH MED,LT,CURRENT USE                       

 

                2019              ISABELLA COKER ARNP              Ot              V67.1      

                          RADIOTHERAPY FOLLOW-UP                       

 

                2019              ISABELLA COKER ARNP              Ot              V67.2      

                          CHEMOTHERAPY FOLLOW-UP                       

 

                2019              ISABELLA COKER ARNP              Ot              V88.01     

                          ACQUIRED ABSENCE OF BOTH CERVIX AND UTER                       

 

             2019              NATHAN CLEMONS              Ot              585.3              

CHRONIC KIDNEY DISEASE, STAGE III (MODER                       

 

                2019              NATHAN CLEMONS N              Ot              V10.43           

                          HX OF OVARIAN MALIGNANCY                       

 

             2019              NATHAN CLEMONS N              Ot              V44.3              

COLOSTOMY STATUS                                 

 

                2019              NATHAN CLEMONS              Ot              V58.69           

                          OTH MED,LT,CURRENT USE                       

 

             2019              NATHAN CLEMONS N              Ot              V67.1              

RADIOTHERAPY FOLLOW-UP                           

 

             2019              NATHAN CLEMONS N              Ot              V67.2              

CHEMOTHERAPY FOLLOW-UP                           

 

                2019              NATHAN CLEMONS N              Ot              V88.01           

                          ACQUIRED ABSENCE OF BOTH CERVIX AND UTER                       

 

                2019              OTHER, UNLISTED              Ot              V58.69           

                          OTH MED,LT,CURRENT USE                       

 

                2019              OTHER, UNLISTED              Ot              V58.83           

                          ENCOUNTER FOR THERAPEUTIC DRUG MONITORIN                       

 

                2019              ISABELLA COKERP              Ot              266.2      

                          B-COMPLEX DEFIC NEC                       

 

                2019              ISABELLA COKER ARNP              Ot              311        

                          DEPRESSIVE DISORDER NEC                       

 

                2019              COKERJULIOMICHAEL S ARNP              Ot              585.3      

                          CHRONIC KIDNEY DISEASE, STAGE III (MODER                       

 

                2019              ISABELLA COKERP              Ot              V10.43     

                          HX OF OVARIAN MALIGNANCY                       

 

                2019              ISABELLA COKERP              Ot              V44.3      

                          COLOSTOMY STATUS                       

 

                2019              COKERJULIOMICHAEL LENCHO CHAUP              Ot              V58.69     

                          OTH MED,LT,CURRENT USE                       

 

                2019              ISABELLA COKER ARNP              Ot              V58.81     

                          FIT/ADJ VASCULAR CATHETER                       

 

                2019              ISABELLA COKER ARNP              Ot              V67.1      

                          RADIOTHERAPY FOLLOW-UP                       

 

                2019              ISABELLA COKERP              Ot              V67.2      

                          CHEMOTHERAPY FOLLOW-UP                       

 

                2019              ISABELLA COKERP              Ot              V88.01     

                          ACQUIRED ABSENCE OF BOTH CERVIX AND UTER                       

 

             2019              NATHAN CLEMONS              Ot              F32.9              

MAJOR DEPRESSIVE DISORDER, SINGLE EPISOD                       

 

             2019              NATHAN CLEMONS              Ot              N18.3              

CHRONIC KIDNEY DISEASE, STAGE 3 (MODERAT                       

 

             2019              NATHAN CLEMONS              Ot              Z08              ENCNTR

 FOR FOLLOW-UP EXAM AFTER TRTMT FO                       

 

                2019              NATHAN CLEMONS              Ot              Z79.899          

                          OTHER LONG TERM (CURRENT) DRUG THERAPY                       

 

                2019              NATHAN CLEMONS              Ot              Z85.43           

                          PERSONAL HISTORY OF MALIGNANT NEOPLASM O                       

 

                2019              ISABELLA COKER ARNP              Ot              C56.9      

                          MALIGNANT NEOPLASM OF UNSPECIFIED OVARY                       

 

             2019              NATHAN CLEMONS              Ot              Z08              ENCNTR

 FOR FOLLOW-UP EXAM AFTER TRTMT FO                       

 

             2019              NATHAN CLEMONS              Ot              Z45.2              

ENCOUNTER FOR ADJUSTMENT AND MANAGEMENT                        

 

                2019              NATHAN CLEMONS              Ot              Z85.43           

                          PERSONAL HISTORY OF MALIGNANT NEOPLASM O                       

 

                2019              MOSES BECERRA DO              Ot              Z01.818        

                          ENCOUNTER FOR OTHER PREPROCEDURAL EXAMIN                       



                                                                                
                                                                                
                                                                                
                                                                                
                                                                                
                                                                                
                                                                                
                                                                                
                                                                                
                                                                                
                                                                                
                                                                                
                                                                                
                                                                                
                                                                                
                                                                                
                                                                                
                                                                                
                                                                                
                                                                                
                                                                                
                                                                                
                                                                                
                                                                                
                                                                                
                                                                                
                                                                                
                                                                                
                



Procedures

      



There is no data.                  



Results

      





                    Test                Result              Range        









                                        Prolactin - 17 11:10         









                    Prolactin              221.2 ng/mL              4.8-23.3        









                                        Protein Electro, Random Urine - 17 22:16         









                    Protein,Total,Urine              <4.0 mg/dL              Not Estab.        

 

                    Please note:              Comment                        

 

                    Albumin, U              33.4 %                       

 

                    Alpha-1-Globulin, U              13.7 %                       

 

                    Alpha-2-Globulin, U              12.2 %                       

 

                    Beta Globulin, U              8.9 %                        

 

                    Gamma Globulin, U              31.9 %                       

 

                    M-Amandeep, %              Not Observed %              Not Observed        









                                        Protein Elec + Interp, Serum - 17 05:25         









                    Protein, Total, Serum              5.6 g/dL              6.0-8.5        

 

                    Albumin              3.0 g/dL              2.9-4.4        

 

                    Alpha-1-Globulin              0.2 g/dL              0.0-0.4        

 

                    Alpha-2-Globulin              0.7 g/dL              0.4-1.0        

 

                    Beta Globulin              0.8 g/dL              0.7-1.3        

 

                    Gamma Globulin              0.9 g/dL              0.4-1.8        

 

                    M-Amandeep              Not Observed g/dL              Not Observed        

 

                    Globulin, Total              2.6 g/dL              2.2-3.9        

 

                    A/G Ratio              1.2                 0.7-1.7        

 

                    Please note:              Comment                        

 

                    P E Interpretation, S              Comment                        









                                        ANN w/Reflex - 17 05:25         









                    ANN Direct              Negative               Negative        









                                        Cortisol - AM - 17 05:25         









                    Cortisol - AM              6.3 ug/dL              6.2-19.4        









                                        LymeAb(IgG/M)+HME(IgG/M)+RM... - 17 05:25         









                    RMSF, IgG, EIA              Equivocal               Negative        

 

                    Cristian Mtn Spotted Fever, IgM              0.21 index              0.00-0.89        

 

                    Lyme IgG/IgM Ab              <0.91 ISR              0.00-0.90        

 

                    Lyme Disease Ab, Quant, IgM              <0.80 index              0.00-0.79        

 

                    E. chaffeensis (HME) IgG Titer              Negative               Neg:<1:64        



 

                    E. chaffeensis (HME) IgM Titer              Negative               Neg:<1:20        











                                        RMSF, IgG, IFA - 17 05:25         









                    RMSF, IgG, IFA              1:64                Neg <1:64        









                                        Prolactin - 17 05:25         









                    Prolactin              110.9 ng/mL              4.8-23.3        



                              



Encounters

      





                ACCT No.              Visit Date/Time              Discharge              Status      

                Pt. Type              Provider              Facility              Loc./Unit      

                                        Complaint        

 

                [id number]              2019 11:23:00              2019 23:59:59              

[facility]              Outpatient              Bhupinder Louise                                 

                                                 

 

                [id number]              2018 10:19:17              2018 23:59:59              

[facility]              Outpatient              Iron Louisea                                 

                                                 

 

                [id number]              2018 15:42:55              2018 23:59:59              

[facility]              Outpatient              [facility]                                 

                                                 

 

                [id number]              2017 12:34:13              2017 23:59:59              

[facility]              Outpatient              EARNEST Lopez Wally                                 

                                                 

 

                [id number]              2017 10:44:38              2017 23:59:59              

[facility]              Outpatient              [facility] Pr[facility]                                  

                                                 

 

                [id number]              2017 14:29:31              2017 23:59:59              

[facility]              Outpatient              Aspen, Bhupinder                                 

                                                 

 

                [id number]              2017 15:11:07              2017 23:59:59              

[facility]              Outpatient              [facility]                                 

                                                 

 

                [id number]              2017 15:58:48              2017 23:59:59              

[facility]              Outpatient              Aspen, Bhupinder                                 

                                                 

 

                [id number]              2017 14:32:48              2017 23:59:59              

[facility]              Outpatient              [facility]                                 

                                                 

 

                [id number]              2017 12:03:33              2017 23:59:59              

[facility]              Outpatient              [facility]                                    

                                                 

 

                [id number]              01/15/2017 15:21:31              01/15/2017 23:59:59              

[facility]              Outpatient              Aj Tapia                                     

                                                 

 

                [id number]              2017 16:35:32              2017 23:59:59              

[facility]              Outpatient              Devin Masterson                                    

                                                 

 

                [id number]              2017 14:33:49              2017 23:59:59              

[facility]              Outpatient              Devin Masterson                                    

                                                 

 

                [id number]              12/15/2016 14:57:00              12/15/2016 23:59:59              

[facility]              Outpatient              [facility]                                 

                                                 

 

                [id number]              2016 15:30:20              2016 23:59:59              

[facility]              Outpatient              Bret Forte                                

                                                 

 

                [id number]              2016 10:57:30              2016 23:59:59              

[facility]              Outpatient              [facility]                                 

                                                 

 

                [id number]              2016 15:57:28              2016 23:59:59              

[facility]              Outpatient              [facility]                                 

                                                 

 

                [id number]              2016 11:57:21              2016 23:59:59              

[facility]              Outpatient              [facility]                                 

                                                 

 

                [id number]              2016 15:19:10              2016 23:59:59              

[facility]              Outpatient              [facility]                                

                                                 

 

                [id number]              03/15/2016 09:12:50              03/15/2016 23:59:59              

[facility]              Outpatient              [facility]                                 

                                                 

 

                [id number]              2016 17:37:47              2016 23:59:59              

[facility]              Outpatient              [facility]                                 

                                                 

 

                [id number]              2015 10:02:56              2015 23:59:59              

[facility]              Outpatient              [facility]                                 

                                                 

 

                [id number]              2015 10:35:54              2015 23:59:59              

[facility]              Outpatient              [facility]                                 

                                                 

 

                [id number]              2015 09:26:34              2015 23:59:59              

[facility]              Outpatient              [facility]                                 

                                                 

 

                [id number]              2015 09:49:11              2015 23:59:59              

[facility]              Outpatient              [facility]                                 

                                                 

 

                [id number]              2015 09:25:00              2015 23:59:59              

[facility]              Outpatient              [facility]                                 

                                                 

 

                [id number]              08/10/2015 22:11:33              08/10/2015 23:59:59              

[facility]              Outpatient              [facility]                                 

                                                 

 

                [id number]              08/10/2015 21:49:05              08/10/2015 23:59:59              

[facility]              Outpatient              [facility]                                 

                                                 

 

                [id number]              08/10/2015 21:28:18              08/10/2015 23:59:59              

[facility]              Outpatient              [facility]                                 

                                                 

 

                [id number]              2015 17:39:07              2015 23:59:59              

[facility]              Outpatient              [facility]                                 

                                                 

 

                [id number]              2015 15:38:08              2015 23:59:59              

[facility]              Outpatient              [facility]                                 

                                                 

 

                [id number]              10/16/2014 12:00:48              10/16/2014 23:59:59              

[facility]              Outpatient              [facility]                                 

                                                 

 

                [id number]              2014 17:38:50              2014 23:59:59              

[facility]              Outpatient              Radha Ontiveros                                    

                                                 

 

                [id number]              07/10/2014 15:39:39              07/10/2014 23:59:59              

[facility]              Outpatient              Bhupinder Louise                                 

                                                 

 

                [id number]              2014 16:13:32              2014 23:59:59              

[facility]              Outpatient              Bhupinder Louise                                 

                                                 

 

                [id number]              2014 16:12:35              2014 23:59:59              

[facility]              Outpatient              Bhupinder Louise                                 

                                                 

 

                [id number]              2014 14:36:03              2014 23:59:59              

[facility]              Outpatient              Bhupinder Louise                                 

                                                 

 

                [id number]              2014 03:11:15              2014 23:59:59              

[facility]              Outpatient              EARNEST Lopez                                 

                                                 

 

             [id number]              2019 15:04:11                                            Document

 Registration                                                                       

 

             [id number]              2019 07:16:09                                            Document

 Registration                                                                       

 

             [id number]              2019 07:00:32                                            Document

 Registration                                                                       

 

             [id number]              2019 07:11:45                                            Document

 Registration                                                                       

 

             [id number]              2018 08:25:03                                            Document

 Registration                                                                       

 

             [id number]              2018 07:57:34                                            Document

 Registration                                                                       

 

             [id number]              2018 13:34:55                                            Document

 Registration                                                                       

 

             [id number]              2018 07:51:32                                            Document

 Registration                                                                       

 

             [id number]              2018 07:09:45                                            Document

 Registration                                                                       

 

             [id number]              2018 08:43:43                                            Document

 Registration                                                                       

 

             [id number]              2018 07:19:08                                            Document

 Registration                                                                       

 

             [id number]              2018 07:19:03                                            Document

 Registration                                                                       

 

             [id number]              2018 06:33:04                                            Document

 Registration                                                                       

 

             [id number]              2017 06:53:47                                            Document

 Registration                                                                       

 

             [id number]              2017 06:34:37                                            Document

 Registration                                                                       

 

             [id number]              2017 23:39:28                                            Document

 Registration                                                                       

 

             [id number]              2017 06:51:13                                            Document

 Registration                                                                       

 

                [id number]              2017 08:10:00                                          

             Document Registration                                                                   

 

 

                [id number]              2017 11:05:00                                          

             Document Registration                                                                   

 

 

                [id number]              2017 13:06:00                                          

             Document Registration                                                                   

 

 

             [id number]              2015 14:07:53                             ACT              Document

 Registration                                                                       

 

                [id number]              2017 12:06:00                                          

             Document Registration                                                                   

 

 

                [id number]              2017 14:07:00                                          

             Document Registration                                                                   

 

 

                [id number]              2017 17:06:00                                          

             Document Registration                                                                   

 

 

                    [id number]              2019 05:57:00              2019 23:59:59      

                CLS              Outpatient              MOSES BECERRA DO B              [facility]              PREOP                     MALFUNCTIONING PORT        

 

                    [id number]              2019 15:00:00              2019 23:59:59      

                CLS              Outpatient              MARIAM DOEDOUARDIC B              [facility]              RAD                       PORT STATUS        

 

                    [id number]              2019 13:13:00              2019 23:59:59      

                CLS              Outpatient              DEONTE BOBAN N              [facility]              ONC                                

 

                    [id number]              2018 13:59:00              2018 00:01:00      

                DIS              Outpatient              DEONTE, BOBAN N              [facility]              ONC                                

 

                    [id number]              2017 15:35:00              2017 00:01:00      

                DIS              Outpatient              DEONTE, BOBAN N              [facility]              ONC                                

 

                    [id number]              2017 00:11:00              2017 23:59:59      

                CLS              Preadmit              DEONTE, BOBAN N              [facility]              PAR                                

 

                    [id number]              10/20/2016 14:49:00              2017 00:01:00      

                DIS              Outpatient              DEONTE, BOBAN N              [facility]              PAR                                

 

                    [id number]              2016 13:08:00              2016 00:01:00      

                DIS              Outpatient              DEONTE, BOBAN N              [facility]              PAR                                

 

                    [id number]              2016 15:31:00              2016 00:01:00      

                DIS              Outpatient              DEONTE, BOBAN N              [facility]              ONC                                

 

                    [id number]              2016 13:26:00              2016 00:01:00      

                DIS              Outpatient              DEONTE, BOBAN N              [facility]              PAR                                

 

                    [id number]              2016 13:52:00              2016 23:59:59      

                CLS              Outpatient              COKER, HILAH S ARNP              [facility]              ONC                                

 

                    [id number]              2015 13:51:00              2015 23:59:59      

                CLS              Outpatient              NATHAN CLEMONS FIORELLA              [facility]              ONC                                

 

                    [id number]              2015 14:07:00              2015 23:59:59      

                CLS              Outpatient              JULIO COKERMICHAEL S ARNP              [facility]              ONC                                

 

                    [id number]              2014 13:38:00              2014 23:59:59      

                CLS              Outpatient              OTHER, UNLISTED              [facility]              LAB                                

 

                    [id number]              2014 13:18:00              2014 23:59:59      

                CLS              Outpatient              NATHAN CLEMONS FIORELLA              [facility]              ONC                                

 

                    [id number]              2014 12:55:00              2014 23:59:59      

                CLS              Outpatient              ISABELLA COKER ARNP              [facility]              ONC                                

 

                    [id number]              2013 12:46:00              2013 23:59:59      

                CLS              Outpatient              NATHAN CLEMONS FIORELLA              [facility]              ONC                                

 

                    [id number]              2013 12:43:00              2013 23:59:59      

                CLS              Outpatient              ISABELLA COKER ARNP              [facility]              ONC                       RCR PORT FLUSH        

 

                [id number]              2019 12:20:00                              PEN         

                    Preadmit              MOSES BECERRA DO              [facility]

                          SDC                       MALFUNCTIONING PORT        

 

                [id number]              2015 11:10:00                                          

             Document Registration                                                                   

 

 

                [id number]              2012 13:54:00                                          

             Document Registration                                                                   

 

 

                [id number]              2012 13:51:00                                          

             Document Registration                                                                   

 

 

                [id number]              2012 13:45:00                                          

             Document Registration                                                                   

 

 

                [id number]              2012 13:40:00                                          

             Document Registration                                                                   

 

 

                [id number]              10/27/2011 13:20:00                                          

             Document Registration                                                                   

 

 

                [id number]              10/19/2011 05:38:00                                          

             Document Registration                                                                   

 

 

                [id number]              10/17/2011 15:52:00                                          

             Document Registration                                                                   

 

 

                [id number]              2011 11:59:00                                          

             Document Registration                                                                   

 

 

                [id number]              2011 13:19:00                                          

             Document Registration                                                                   

 

 

                [id number]              2011 14:41:00                                          

             Document Registration                                                                   

 

 

                [id number]              2010 12:48:00                                          

             Document Registration                                                                   

 

 

                [id number]              2010 00:00:00                                          

             Document Registration                                                                   

 

 

                [id number]              2010 09:43:00                                          

             Document Registration                                                                   

 

 

                [id number]              2017 08:10:00                                          

             Document Registration

## 2019-06-26 NOTE — XMS REPORT
MU2 Ambulatory Summary

                             Created on: 2018



Pauline Gan

External Reference #: [id number]

: 1950

Sex: Female



Demographics







                          Address                   [address] [place]  [address]

 

                          Home Phone                [phone]

 

                          Preferred Language        English

 

                          Marital Status            

 

                          Caodaism Affiliation     Unknown

 

                          Race                      White

 

                          Ethnic Group              Not  or 





Author







                          Author                    Bhupinder Louise

 

                          [facility]

 

                          Address                   [address]

[place]  [id number]



 

                          Phone                     [phone]







Care Team Providers







                    Care Team Member Name    Role                Phone

 

                    Bhupinder Louise    PCP                 [phone]

 

                    Bhupinder Louise    PreferredProvider    [phone]







Allergies and Adverse Reactions







                    Name                Reaction            Notes

 

                    NO KNOWN DRUG ALLERGIES                         







Plan of Treatment







             Planned Activity    Comments     Planned Date    Planned Time    Plan/Goal

 

             Injection,Subcutaneous/Intramuscul, [facility]                 2017    12:00 AM      







Medications







                                        Active 

 

             Name         Start Date    Estimated Completion Date    SIG          Comments

 

             pravastatin 40 mg oral tablet    3/30/2015                 TAKE 1 TABLET BY MOUTH DAILY     

 

                Namenda XR 28 mg oral capsule,sprinkle,ER 24hr    2016                       take 1 capsule (28

 mg) by oral route once daily            

 

                potassium chloride 10 mEq oral tablet extended release    2016                       take 1 tablet

 (10 meq) by oral route once daily       

 

             pravastatin 40 mg oral tablet    2016                 TAKE 1 TABLET BY MOUTH DAILY     

 

                Vitamin B-12 1,000 mcg/mL injection solution    2016                       inject 1 milliliter 

(1,000 mcg) by intramuscular route once a month     

 

                potassium chloride 10 mEq oral tablet extended release    2016                      take 1 tablet

 (10 meq) by oral route once daily       

 

                Namenda XR 28 mg oral capsule,sprinkle,ER 24hr    2016                      TAKE 1 CAPSULE BY

 MOUTH EVERY DAY                         

 

                potassium chloride 10 mEq oral tablet extended release    2017                       TAKE 2 TABLETs

 BY MOUTH twice DAILY for one week       

 

                aspirin 81 mg oral tablet,delayed release (DR/EC)    2017                       TAKE 1 TABLET BY

 MOUTH EVERY DAY                         

 

             MULTI-VITAMINS    2017                 TAKE 1 TABLET BY MOUTH EVERY DAY     

 

                carbamazepine 200 mg oral tablet    2018                       TAKE 1 TABLET BY MOUTH BEFORE BREAKFAST

 AND TAKE 2 TABLETS AT BEDTIME for 30 days     

 

                rivastigmine tartrate 1.5 mg oral capsule    2018                       TAKE 1 CAPSULE BY MOUTH

 TWICE DAILY BEFORE MEALS for 30 days     

 

                simvastatin 20 mg oral tablet    2018                       TAKE 1 TABLET BY MOUTH AT BEDTIME for

 30 days                                 

 

             famotidine 20 mg oral tablet    2018                 TAKE 1 TABLET BY MOUTH TWICE DAILY    

 

 

                Namenda XR 28 mg oral capsule,sprinkle,ER 24hr                                    take 1 capsule (28 mg) by 

oral route once daily                    

 

                bisacodyl 5 mg oral tablet,delayed release (DR/EC)    2018                       take 3 tablets

 by oral route daily                    Mediaction adjusted 18

 

                memantine 28 mg oral capsule,sprinkle,ER 24hr    2018                       TAKE 1 CAPSULE BY MOUTH

 DAILY                                   

 

                rivastigmine tartrate 1.5 mg oral capsule    2018                        TAKE 1 CAPSULE BY MOUTH 

TWICE DAILY BEFORE MEALS for 30 days     

 

                carbamazepine 200 mg oral tablet    2018                        TAKE 1 TABLET BY MOUTH BEFORE BREAKFAST

 AND TAKE 2 TABLETS AT BEDTIME for 30 days     

 

                simvastatin 20 mg oral tablet    2018                       TAKE 1 TABLET BY MOUTH AT BEDTIME FOR

 30 DAYS                                 

 

             benztropine 1 mg oral tablet    9/10/2018                 TAKE 1 TABLET IN THE MORNING     

 

             quetiapine 25 mg oral tablet    9/10/2018                 TAKE 1 TABLET BY MOUTH AT BEDTIME     



 

                Remeron 15 mg oral tablet    2018                       take 1 tablet (15 mg) by oral route once

 daily before bedtime                    









                                         

 

             Name         Start Date    Expiration Date    SIG          Comments

 

             Reglan 10mg    3/29/2010    2010    one ac and hs     

 

                Keflex 500 mg oral capsule    2010       10/1/2010       take 1 capsule (500 mg) by oral

 route every 6 hours for 10 days         

 

                Bactrim -160 mg oral tablet    2011       take 1 tablet by oral route

 every 12 hours for 7 days               

 

                triamcinolone acetonide 0.1 % topical cream    2011      apply a thin

 layer to the affected area(s) by topical route 2 times per day     

 

                ergocalciferol (vitamin D2) 50,000 unit oral capsule    4/15/2013       2013       TAKE

 ONE CAPSULE BY MOUTH ONCE A WEEK        

 

                CYANOCOBALAM 1000MCGINJ 1000 milliliter    2013       INJECT 1ML INTRAMUSCULAR

 ONCE A MONTH                            

 

                pravastatin 40 mg oral tablet    3/25/2014       3/20/2015       TAKE ONE TABLET BY MOUTH EVERY

 DAY                                     

 

                          Zostavax (PF) 19,400 unit/0.65 mL subcutaneous suspension for reconstitution    3/23/2015

                    3/24/2015           inject 0.65 milliliter by subcutaneous route once     

 

                famciclovir 500 mg oral tablet    12/3/2015       12/10/2015      take 1 tablet (500 mg) by

 oral route every 8 hours for 7 days     

 

                furosemide 40 mg oral tablet    2016      take 1 tablet (40 mg) by oral

 route once daily                        

 

                Cipro 500 mg oral tablet    2016       take 1 tablet (500 mg) by oral route

 2 times per day for 5 days              

 

                Bactrim -160 mg oral tablet    2016        take 1 tablet by oral route

 every 12 hours for 7 days               

 

                Macrobid 100 mg oral capsule    2017       take 1 capsule (100 mg) by oral

 route 2 times per day with food for 7 days     

 

                Augmentin 875-125 mg oral tablet    2017       take 1 tablet by oral route

 every 12 hours for 7 days               

 

                amoxicillin 500 mg oral tablet    2017       take 1 tablet (500 mg) by oral

 route every 12 hours for 7 days         

 

                Namenda XR 28 mg oral capsule,sprinkle,ER 24hr    2017                       TAKE 1 CAPSULE BY 

MOUTH EVERY DAY                         Taking Generic

 

                ciprofloxacin HCl 500 mg oral tablet    2017       take 1 tablet (500 mg)

 by oral route every 12 hours for 5 days     

 

             pravastatin 40 mg oral tablet    2017                 TAKE 1 TABLET BY MOUTH DAILY    Taking

 Simvastatin

 

                cefuroxime axetil 500 mg oral tablet    2017        take 1 tablet (500 mg)

 by oral route 2 times per day for 10 days     

 

                famotidine 20 mg oral tablet    2017        TAKE 1 TABLET BY MOUTH TWICE

 DAILY                                   

 

                sertraline 100 mg oral tablet    2017       TAKE ONE AND ONE-HALF TABLETS

 BY MOUTH DAILY IN THE MORNING           

 

                clindamycin HCl 150 mg oral capsule    2018       take 1 capsule (150 mg)

 by oral route 2 times per day for 7 days     









                                        Discontinued 

 

             Name         Start Date    Discontinued Date    SIG          Comments

 

                Tylenol 325 mg oral tablet                    2013        take 1 - 2 tablets (325 -650 mg) by oral

 route every 4-6 hours as needed         

 

                Calcium 600 + D(3) 600 mg(1,500mg) -400 unit oral tablet                    2011       take 1 tablet

 by oral route 2 times a day            no longer taking

 

                Vitamin B-12 1,000 mcg oral tablet extended release    2010       take 1

 tablet by oral route daily             no longer taking

 

                Antifungal (clotrimazole) 1 % topical cream    2010       apply to the 

affected and surrounding areas of skin by topical route 2 times per day morning 
and evening                              

 

                sertraline 100 mg oral tablet    5/10/2011       2011       take 2 tablets (200 mg) by 

oral route once daily                   discontinued by Dr. Serrano

 

                mirtazapine 15 mg oral tablet                    2011        take 1 tablet (15 mg) by oral route 

once daily before bedtime               dc'd by Dr. Serrano

 

                Pristiq 50 mg oral tablet extended release 24 hr                    2013        take 1 tablet (50

 mg) by oral route once daily           dose updated

 

                Vitamin B-12 1,000 mcg/mL injection solution    2011        inject 1 milliliter

 (1,000 mcg) by intramuscular route once a month    on list already

 

                    syringe with needle 1 mL 25 gauge x 1" miscellaneous syringe    2011

                          use for injection once a month     

 

                clotrimazole 1 % topical cream    2011        apply to the affected and surrounding

 areas of skin by topical route 2 times per day in the morning and evening     

 

                Vitamin D2 50,000 unit oral capsule    2011        take 1 capsule (50,000

 unit) by oral route once weekly        generic on list

 

                Pravachol 40 mg oral tablet    2012        take 1 tablet (40 mg) by oral 

route once daily for 90 days            generic on list

 

                lithium carbonate 300 mg oral capsule    2012        take 1 capsule by oral

 route daily                            dose updated

 

                Pristiq 100 mg oral tablet extended release 24 hr                    4/10/2012       take 1 and 1/2 

tablet (150 mg) by oral route once daily    Discontinued by Dr Efrain [facility] at [facility]

 

                hydroxyzine HCl 50 mg oral tablet    10/16/2014      2015       take 1 tablet (50 mg) 

by oral route at bedtime                 

 

                Latuda 20 mg oral tablet                    2018       take 1 tablet (20 mg) by oral route once

 daily with food (at least 350 calories)     

 

                lithium carbonate 300 mg oral capsule    2015       take 1 capsule (300

 mg) by oral route 2 for 30 days         

 

                fluconazole 100 mg oral tablet    2015       12/3/2015       take 1 tablet (100 mg) by 

oral route once a week                   

 

                ketoconazole 2 % topical cream    2015       12/3/2015       apply to the affected area(s)

 by topical route 2 times per day        

 

                prednisone 10 mg oral tablet    12/3/2015       2016        take 2 tablets (20 mg) by oral

 route once daily for 4 days 1 tablet daily for 4 days 0.5 tablet daily for 4 
days                                     

 

                triamcinolone acetonide 0.1 % topical cream    2016       apply a thin layer

 to the affected area(s) by topical route 2 times per day     

 

                furosemide 40 mg oral tablet    2016                      take 1 tablet (40 mg) by oral route

 once daily                              

 

                metoclopramide HCl 10 mg oral tablet    2017                        take 1 tablet by oral route 2

 times a day for 50 days                 

 

                Cipro 500 mg oral tablet    1/15/2017       2017       take 1 tablet (500 mg) by oral route

 every 12 hours for 10 days              

 

                mirtazapine 45 mg oral tablet                    2018       take 1 tablet (45 mg) by oral route

 once daily before bedtime               

 

                Fish Oil 300-1,000 mg oral capsule                    2018       take 1 capsule by oral route daily

                                         

 

                Vitamin D3 1,000 unit oral tablet                    2018       take 1 tablet by oral route daily

                                         

 

                Calcium 600 600 mg calcium (1,500 mg) oral tablet                    2018       take 1 tablet by

 oral route daily                        

 

                Aspirin Low Dose 81 mg oral tablet,delayed release (DR/EC)                    2018       take 1

 tablet (81 mg) by oral route once daily     

 

                metoclopramide HCl 10 mg oral tablet    2017                       take 1 tablet by oral route 

2 times a day for 50 days                

 

                furosemide 40 mg oral tablet    2017       TAKE 1 TABLET BY MOUTH DAILY

                                         

 

                Linzess 72 mcg oral capsule    2017       take 1 capsule (72 mcg) by oral

 route once daily on an empty stomach at least 30 minutes before 1st meal of the
day                                      

 

                metoclopramide HCl 10 mg oral tablet    2017       TAKE 1 TABLET BY ORAL

 ROUTE 2 TIMES A DAY FOR 50 DAYS         

 

                potassium chloride 10 mEq oral tablet extended release    2017       TAKE

 2 TABLETs BY MOUTH twice DAILY for one week     

 

                BISACODYL 5MG PV (BOX OF 25)    2017       TAKE 1 TABLET BY MOUTH EVERY

 DAY                                    dose updated

 

                memantine 10 mg oral tablet    2018       TAKE 1 TABLET BY MOUTH TWICE 

DAILY for 30 days                       dose updagted







Problem List







                    Description         Status              Onset

 

                    Artificial opening status; colostomy    Active               

 

                    Bipolar disorder, unspecified    Active               

 

                    Hyperlipidemia      Active               

 

                    Peritoneal Neoplasm, Malignant    Active               

 

                    Anemia, Pernicious    Active               

 

                    Arthritis unspecified    Active               

 

                    B12 deficiency      Active               







Vital Signs







      Date    Time    BP-Sys(mm[Hg]    BP-Lynn(mm[Hg])    HR(bpm)    RR(rpm)    Temp    WT    HT    HC    BMI

                    BSA                 BMI Percentile      O2 Sat(%)

 

        2018    9:32:00 AM    124 mmHg    80 mmHg    65 bpm    20 rpm    97 F    136.5 lbs    69 in

                          20.1573 kg/m    1.7362 m                 98 %

 

        2018    2:36:00 PM    134 mmHg    70 mmHg    68 bpm    22 rpm    97.9 F    142 lbs    69 in

                          20.97 kg/m2    1.77 m2                   98 %

 

        2017    1:34:00 PM    118 mmHg    62 mmHg    122 bpm    18 rpm    97.8 F    161.375 lbs    

69 in                     23.8307 kg/m    1.8877 m                 96 %

 

        2017    3:05:00 PM    134 mmHg    70 mmHg    70 bpm    20 rpm    97.4 F    181 lbs    69 in

                          26.73 kg/m2    2.00 m2                   98 %

 

        2017    11:07:00 AM    124 mmHg    64 mmHg    62 bpm    17 rpm    98.2 F    181.2 lbs    69

 in                       26.7583 kg/m    2.0003 m                 98 %

 

        1/15/2017    3:34:00 PM    148 mmHg    89 mmHg    69 bpm    20 rpm    98.2 F    179 lbs    69 in

                          26.43 kg/m2    1.99 m2                   98 %

 

       2017    1:51:00 PM    160 mmHg    90 mmHg    100 bpm    20 rpm    96.5 F    179 lbs             

                                                                98 %

 

       2016    3:11:00 PM    134 mmHg    76 mmHg    80 bpm    20 rpm    98 F    163 lbs    69 in     

                24.0706 kg/m    1.8972 m                      98 %

 

        2016    2:04:00 PM    142 mmHg    86 mmHg    68 bpm    16 rpm    98.5 F    166 lbs    63 in

                          29.41 kg/m2    1.83 m2                   100 %

 

        2016    11:27:00 AM    148 mmHg    78 mmHg    90 bpm    20 rpm    98.2 F    153 lbs    69 in

                          22.5939 kg/m    1.8381 m                 96 %

 

        12/3/2015    9:50:00 AM    132 mmHg    70 mmHg    62 bpm    16 rpm    97.9 F    145 lbs    69 in

                          21.41 kg/m2    1.79 m2                   100 %

 

        2015    8:52:00 AM    132 mmHg    68 mmHg    52 bpm    20 rpm    97.8 F    141 lbs    69 in

                          20.8218 kg/m    1.7645 m                 100 %

 

        2015    3:25:00 PM    120 mmHg    62 mmHg    72 bpm    16 rpm    98.1 F    136 lbs    69 in

                          20.08 kg/m2    1.73 m2                   98 %

 

       3/23/2015    2:55:00 PM    130 mmHg    76 mmHg    68 bpm    18 rpm    97 F    140 lbs    69 in    

                20.6742 kg/m    1.7583 m                      98 %

 

        10/16/2014    11:11:00 AM    120 mmHg    66 mmHg    77 bpm    20 rpm    98 F    130 lbs    69 in

                          19.20 kg/m2    1.69 m2                   100 %

 

        2014    3:21:00 PM    130 mmHg    66 mmHg    63 bpm    18 rpm    97.2 F    160 lbs    69 in

                          23.6276 kg/m    1.8797 m                 99 %

 

        2013    10:35:00 AM    132 mmHg    70 mmHg    66 bpm    20 rpm    98.1 F    157 lbs    69 in

                          23.18 kg/m2    1.86 m2                    

 

        2013    1:29:00 PM    132 mmHg    70 mmHg    76 bpm    18 rpm    98.2 F    166 lbs    69 in 

                          24.5137 kg/m    1.9146 m                  

 

       2013    2:46:00 PM    128 mmHg    70 mmHg    76 bpm    16 rpm    98 F    160 lbs    69 in     

                23.63 kg/m2     1.88 m2                          

 

        2011    8:49:00 AM    128 mmHg    78 mmHg    70 bpm    18 rpm    97.9 F    164 lbs    69 in

                          24.2183 kg/m    1.903 m                  

 

     2011    1:31:00 PM    132 mmHg    68 mmHg    84 bpm         97 F    167 lbs                        

                                         

 

        2011    9:09:00 AM    128 mmHg    70 mmHg    72 bpm    18 rpm    98.2 F    163 lbs    64 in 

                          27.9786 kg/m    1.8272 m                  

 

       2011    10:01:00 AM    132 mmHg    70 mmHg    72 bpm    18 rpm    98.2 F    154 lbs             

                                                                 

 

       2011    2:47:00 PM    128 mmHg    70 mmHg    72 bpm    18 rpm    97.8 F    156 lbs             

                                                                 

 

       5/10/2011    3:16:00 PM    144 mmHg    80 mmHg    72 bpm    18 rpm    98.2 F    158 lbs             

                                                                 

 

        2011    10:11:00 AM    132 mmHg    70 mmHg    70 bpm    18 rpm    98.2 F    168 lbs    69 in

                          24.809 kg/m    1.9261 m                  

 

        4/15/2011    10:52:00 AM    110 mmHg    60 mmHg    75 bpm    16 rpm    97.5 F    172.375 lbs    

69 in                     25.46 kg/m2    1.95 m2                   100 %

 

        2011    11:43:00 AM    120 mmHg    82 mmHg    75 bpm    16 rpm    97.2 F    178.5 lbs    69

 in                       26.3596 kg/m    1.9854 m                 100 %

 

        10/15/2010    1:32:00 PM    120 mmHg    70 mmHg    80 bpm    18 rpm    96.6 F    177 lbs    69 in

                          26.14 kg/m2    1.98 m2                   100 %

 

        2010    3:50:00 PM    168 mmHg    100 mmHg    82 bpm    18 rpm    97.8 F    177.5 lbs    69

 in                       26.2119 kg/m    1.9798 m                 97 %

 

        2010    1:21:00 PM    140 mmHg    80 mmHg    59 bpm    16 rpm    97.6 F    173.25 lbs    69 

in                        25.58 kg/m2    1.96 m2                   100 %

 

        2010    3:02:00 PM    140 mmHg    80 mmHg    61 bpm    16 rpm    97.6 F    173.125 lbs    69

 in                       25.5658 kg/m    1.9553 m                 99 %

 

        2010    1:23:00 PM    130 mmHg    80 mmHg    66 bpm    16 rpm    96.8 F    173 lbs    69 in 

                          25.55 kg/m2    1.95 m2                   100 %

 

        2010    12:58:00 PM    130 mmHg    88 mmHg    75 bpm    16 rpm    98.4 F    172.25 lbs    69

 in                       25.4366 kg/m    1.9503 m                 100 %







Social History







                    Name                Description         Comments

 

                    denies alcohol use                         

 

                    denies smoking                           

 

                    Denies illicit substance abuse                         

 

                    retired                                 direct care

 

                    Single                                   

 

                    Exercises regularly                         

 

                    Attended some college                         

 

                    Tobacco             Never smoker         







History of Procedures







                    Date Ordered        Description         Order Status

 

                    2010 12:00 AM    COMPREHEN METABOLIC PANEL    Reviewed

 

                    2010 12:00 AM    COMPLETE CBC W/AUTO DIFF WBC    Reviewed

 

                    2010 12:00 AM    LIPID PANEL         Reviewed

 

                          2015 12:00 AM        B12 Injection, Up to 1000 Mcg NDC#9090-5724-34 [facility] 

                                        Reviewed

 

                    2011 12:00 AM    MAMMOGRAM SCREENING    Reviewed

 

                    2011 12:00 AM    CYTOPATH C/V THIN LAYER    Reviewed

 

                    2011 12:00 AM    B12 Injection 1 cc NDC#[address]-05    Reviewed

 

                    2015 12:00 AM    THER/PROPH/DIAG INJ SC/IM    Reviewed

 

                    2015 12:00 AM    B12 Injection, Up to 1000 Mcg NDC#6667-5800-92    Reviewed

 

                    2011 12:00 AM    THER/PROPH/DIAG INJ SC/IM    Reviewed

 

                    2011 12:00 AM    B12 Injection([place]) Ndc#5561-2872-45-    Reviewed

 

                    2015 12:00 AM    THER/PROPH/DIAG INJ SC/IM    Reviewed

 

                    2015 12:00 AM    B12 Injection, Up to 1000 Mcg NDC#8865-3989-92    Reviewed

 

                    10/20/2011 12:00 AM    THER/PROPH/DIAG INJ SC/IM    Reviewed

 

                    10/20/2011 12:00 AM    B12 Injection([place]) Ndc#5091-1497-09-    Reviewed

 

                    2016 12:00 AM    THER/PROPH/DIAG INJ SC/IM    Reviewed

 

                    2016 12:00 AM    B12 Injection, Up to 1000 Mcg NDC#6794-4510-04    Reviewed

 

                    3/14/2016 12:00 AM    VITAMIN B-12        Reviewed

 

                    3/15/2016 12:00 AM    THER/PROPH/DIAG INJ SC/IM    Reviewed

 

                    3/15/2016 12:00 AM    B12 Injection, Up to 1000 Mcg NDC#4094-8534-77    Reviewed

 

                    2011 12:00 AM    ***Immunization administration, Medicare flu    Reviewed

 

                    2011 12:00 AM    Fluzone ** MEDICARE Only **    Reviewed

 

                    2011 12:00 AM    THER/PROPH/DIAG INJ SC/IM    Reviewed

 

                    2011 12:00 AM    B12 Injection ([employer]) Ndc#8409-1785-21    Reviewed

 

                    2016 12:00 AM    B12 Injection, Up to 1000 Mcg NDC#5684-7932-56 [facility] Medicare    

Reviewed

 

                    2016 12:00 AM    TTE W/DOPPLER COMPLETE    Reviewed

 

                    2016 12:00 AM    EXTREMITY STUDY     Reviewed

 

                          2016 12:00 AM        B12 Injection, Up to 1000 Mcg NDC#9608-6628-60 [facility] Medicare 

                                        Reviewed

 

                    2016 12:00 AM    THER/PROPH/DIAG INJ SC/IM    Reviewed

 

                    2016 12:00 AM    B12 Injection, Up to 1000 Mcg NDC#1823-5262-75    Reviewed

 

                    2016 12:00 AM    THER/PROPH/DIAG INJ SC/IM    Reviewed

 

                    2012 12:00 AM    B12 Injection([facility]) Ndc#5793-0087-92-    Reviewed

 

                    2016 12:00 AM    B12 Injection, Up to 1000 Mcg NDC#8058-6760-84    Reviewed

 

                    2016 12:00 AM    THER/PROPH/DIAG INJ SC/IM    Reviewed

 

                    2012 12:00 AM    THER/PROPH/DIAG INJ SC/IM    Reviewed

 

                    2012 12:00 AM    B12 Injection ([employer]) Ndc#1492-4636-96    Reviewed

 

                    2016 12:00 AM    THER/PROPH/DIAG INJ SC/IM    Reviewed

 

                    2016 12:00 AM    B12 Injection, Up to 1000 Mcg NDC#7113-8759-69    Reviewed

 

                    2016 12:00 AM    B12 Injection, Up to 1000 Mcg NDC#1797-3551-74    Reviewed

 

                    2016 12:00 AM    THER/PROPH/DIAG INJ SC/IM    Reviewed

 

                    2012 12:00 AM    THER/PROPH/DIAG INJ SC/IM    Reviewed

 

                    2012 12:00 AM    B12 Injection([facility]) Ndc#6555-0226-14-    Reviewed

 

                    12/15/2016 12:00 AM    B12 Injection, Up to 1000 Mcg NDC#8629-9021-05    Reviewed

 

                    12/15/2016 12:00 AM    THER/PROPH/DIAG INJ SC/IM    Reviewed

 

                    2016 12:00 AM    URNLS DIP STICK/TABLET RGNT AUTO W/O MICROSCOPY    Reviewed

 

                    1/3/2017 12:00 AM    URNLS DIP STICK/TABLET RGNT AUTO W/O MICROSCOPY    Reviewed

 

                    2017 12:00 AM    URINE CULTURE/COLONY COUNT    Reviewed

 

                    2017 12:00 AM    Rocephin 1 gram Injection, [facility] Medicare    Reviewed

 

                    2017 12:00 AM    THERAPEUTIC PROPHYLACTIC/DX INJECTION SUBQ/IM    Reviewed

 

                    2017 12:00 AM    B12 1000mcg Injection, [facility] Medicare    Reviewed

 

                    5/3/2012 12:00 AM    THER/PROPH/DIAG INJ SC/IM    Reviewed

 

                    5/3/2012 12:00 AM    B12 Injection([facility]) Ndc#6219-6212-15-    Reviewed

 

                    2017 12:00 AM    THERAPEUTIC PROPHYLACTIC/DX INJECTION SUBQ/IM    Reviewed

 

                    2017 12:00 AM    B12 1000mcg Injection, [facility] Medicare    Reviewed

 

                    2017 12:00 AM    MRI BRAIN STEM W/O & W/DYE    Reviewed

 

                    2017 12:00 AM    VITAMIN B-12        Reviewed

 

                    2017 12:00 AM    Speech Therapy Consult    Reviewed

 

                    2017 12:00 AM    ASSAY OF CREATININE    Reviewed

 

                    2012 12:00 AM    IMMUNOTHERAPY INJECTIONS    Reviewed

 

                    2012 12:00 AM    B12 Injection([facility]) Ndc#7101-0615-73-    Reviewed

 

                    2017 12:00 AM    URINALYSIS AUTO W/SCOPE    Reviewed

 

                    2012 12:00 AM    THER/PROPH/DIAG INJ SC/IM    Reviewed

 

                    2012 12:00 AM    B12 Injection, Up to 1000 Mcg NDC#1148-5221-09    Reviewed

 

                    2017 12:00 AM    URINALYSIS AUTO W/SCOPE    Reviewed

 

                    2017 2:18 PM    URINALYSIS AUTO W/O SCOPE    Reviewed

 

                    2017 12:00 AM    URINE CULTURE/COLONY COUNT    Reviewed

 

                    2017 12:00 AM    B12 1000mcg Injection    Reviewed

 

                    2017 12:00 AM    URNLS DIP STICK/TABLET RGNT AUTO W/O MICROSCOPY    Reviewed

 

                    2017 12:00 AM    METABOLIC PANEL TOTAL CA    Reviewed

 

                    2017 12:00 AM    URNLS DIP STICK/TABLET RGNT AUTO W/O MICROSCOPY    Reviewed

 

                    2012 12:00 AM    THER/PROPH/DIAG INJ SC/IM    Reviewed

 

                    2012 12:00 AM    B12 Injection, Up to 1000 Mcg NDC#5690-7398-21    Reviewed

 

                    2012 12:00 AM    THER/PROPH/DIAG INJ SC/IM    Reviewed

 

                    2012 12:00 AM    B12 Injection, Up to 1000 Mcg NDC#0489-0562-03    Reviewed

 

                    2017 12:00 AM    ASSAY CARBAMAZEPINE TOTAL    Reviewed

 

                    2017 12:00 AM    AUTOMATED DIFF WBC COUNT    Reviewed

 

                    2017 12:00 AM    COMPREHEN METABOLIC PANEL    Reviewed

 

                    2017 12:00 AM    MANUAL RETICULOCYTE COUNT    Reviewed

 

                    2017 12:00 AM    VITAMIN B-12        Reviewed

 

                    2017 12:00 AM    ASSAY OF FOLIC ACID SERUM    Reviewed

 

                    10/16/2012 12:00 AM    THER/PROPH/DIAG INJ SC/IM    Reviewed

 

                    10/16/2012 12:00 AM    B12 Injection, Up to 1000 Mcg NDC#3074-1974-42    Reviewed

 

                    2010 12:00 AM    COMPREHEN METABOLIC PANEL    Reviewed

 

                    2010 12:00 AM    COMPLETE CBC W/AUTO DIFF WBC    Reviewed

 

                    2010 12:00 AM    LIPID PANEL         Reviewed

 

                    2013 12:00 AM    Flu Injection 3 Years And Above NDC# [address]-23  [facility]    Reviewed



 

                    2013 12:00 AM    COMPLETE CBC W/AUTO DIFF WBC    Reviewed

 

                    2013 12:00 AM    ASSAY OF LITHIUM    Reviewed

 

                    2013 12:00 AM    METABOLIC PANEL TOTAL CA    Reviewed

 

                    4/3/2013 12:00 AM    THER/PROPH/DIAG INJ SC/IM    Reviewed

 

                    4/3/2013 12:00 AM    B12 Injection, Up to 1000 Mcg NDC#4383-3378-49    Reviewed

 

                    2013 12:00 AM    THER/PROPH/DIAG INJ SC/IM    Reviewed

 

                    2013 12:00 AM    B12 Injection, Up to 1000 Mcg NDC#3649-8373-43    Reviewed

 

                    2013 12:00 AM    THER/PROPH/DIAG INJ SC/IM    Reviewed

 

                    2013 12:00 AM    B12 Injection, Up to 1000 Mcg NDC#8333-9845-50    Reviewed

 

                    2013 12:00 AM    LIPID PANEL         Reviewed

 

                    2013 12:00 AM    VITAMIN D 25 HYDROXY    Reviewed

 

                    2013 12:00 AM    THER/PROPH/DIAG INJ SC/IM    Reviewed

 

                    2013 12:00 AM    B12 Injection, Up to 1000 Mcg NDC#1947-3309-90    Reviewed

 

                    2013 12:00 AM    THER/PROPH/DIAG INJ SC/IM    Reviewed

 

                    3/6/2014 12:00 AM    THER/PROPH/DIAG INJ SC/IM    Reviewed

 

                    2014 12:00 AM    THER/PROPH/DIAG INJ SC/IM    Reviewed

 

                    2014 12:00 AM    B12 Injection, Up to 1000 Mcg NDC#5160-2530-95    Reviewed

 

                    2010 12:00 AM    SKIN FUNGI CULTURE    Reviewed

 

                    10/9/2010 12:00 AM    COMPREHEN METABOLIC PANEL    Reviewed

 

                    10/9/2010 12:00 AM    LIPID PANEL         Reviewed

 

                    2010 12:00 AM    THER/PROPH/DIAG INJ SC/IM    Reviewed

 

                    2010 12:00 AM    B12 Injection Ndc#[address]-54 ([facility])    Reviewed

 

                    2010 12:00 AM    THER/PROPH/DIAG INJ SC/IM    Reviewed

 

                    2010 12:00 AM    Kenalog 40 Mg Im-Ndc#[address]-15 ([place])    Reviewed

 

                    10/15/2010 12:00 AM    FLU VACCINE 3 YRS & > IM    Reviewed

 

                    10/15/2010 12:00 AM    Admin.Of M/C Cov.Vaccine-Flu Vacc.    Reviewed

 

                    1/15/2011 12:00 AM    COMPLETE CBC W/AUTO DIFF WBC    Reviewed

 

                    1/15/2011 12:00 AM    COMPREHEN METABOLIC PANEL    Reviewed

 

                    1/15/2011 12:00 AM    LIPID PANEL         Reviewed

 

                    2014 12:00 AM    MAMMOGRAM SCREENING    Reviewed

 

                    2014 12:00 AM    Screening mammography, bilateral    Reviewed

 

                    7/10/2014 12:00 AM    THER/PROPH/DIAG INJ SC/IM    Reviewed

 

                    7/10/2014 12:00 AM    B12 Injection, Up to 1000 Mcg NDC#9474-1683-60    Reviewed

 

                    2011 12:00 AM    COMPLETE CBC W/AUTO DIFF WBC    Reviewed

 

                    2011 12:00 AM    COMPREHEN METABOLIC PANEL    Reviewed

 

                    2011 12:00 AM    LIPID PANEL         Reviewed

 

                    2014 12:00 AM    B12 Injection, Up to 1000 Mcg NDC#8920-9700-76    Reviewed

 

                    10/19/2014 12:00 AM    MAMMOGRAM SCREENING    Reviewed

 

                    10/19/2014 12:00 AM    Screening mammography, bilateral    Reviewed

 

                    10/16/2014 12:00 AM    B12 Injection, Up to 1000 Mcg NDC#8941-3978-89    Reviewed

 

                    10/16/2014 12:00 AM    COMPLETE CBC W/AUTO DIFF WBC    Reviewed

 

                    10/16/2014 12:00 AM    COMPREHEN METABOLIC PANEL    Reviewed

 

                    10/16/2014 12:00 AM    IMMUNOASSAY TUMOR     Reviewed

 

                    10/16/2014 12:00 AM    LIPID PANEL         Reviewed

 

                    10/16/2014 12:00 AM    ASSAY OF LITHIUM    Reviewed

 

                    10/16/2014 12:00 AM    MAMMOGRAM SCREENING    Reviewed

 

                    2011 12:00 AM    ASSAY OF PARATHORMONE    Reviewed

 

                    2011 12:00 AM    VITAMIN D 25 HYDROXY    Reviewed

 

                    2011 12:00 AM    ASSAY OF LITHIUM    Reviewed

 

                    2011 12:00 AM    METABOLIC PANEL TOTAL CA    Reviewed

 

                    2011 12:00 AM    CT HEAD/BRAIN W/O & W/DYE    Reviewed

 

                    3/23/2015 12:00 AM    PNEUMOCOCCAL VACC 13 GLENDY IM    Reviewed

 

                    3/23/2015 12:00 AM    Vitamin B12 injection    Reviewed

 

                    2011 12:00 AM    ASSAY OF LITHIUM    Reviewed

 

                    2011 12:00 AM    B12 Injection Ndc#[address]-47  [place]    Reviewed

 

                    2015 12:00 AM    THER/PROPH/DIAG INJ SC/IM    Reviewed

 

                    2015 12:00 AM    B12 Injection, Up to 1000 Mcg NDC#8903-7583-87    Reviewed

 

                    2015 12:00 AM    COMPLETE CBC W/AUTO DIFF WBC    Reviewed

 

                    2015 12:00 AM    COMPREHEN METABOLIC PANEL    Reviewed

 

                    2015 12:00 AM    LIPID PANEL         Reviewed

 

                    2015 12:00 AM    ASSAY OF LITHIUM    Reviewed

 

                    2011 12:00 AM    VIT D 1 25-DIHYDROXY    Reviewed

 

                    2011 12:00 AM    VITAMIN B-12        Reviewed

 

                    2015 12:00 AM    B12 Injection, Up to 1000 Mcg NDC#2924-4558-82    Reviewed

 

                    2015 12:00 AM    THER/PROPH/DIAG INJ SC/IM    Reviewed

 

                    2015 12:00 AM    B12 Injection, Up to 1000 Mcg NDC#6249-3607-10    Reviewed

 

                    2011 12:00 AM    THER/PROPH/DIAG INJ SC/IM    Reviewed

 

                    2011 12:00 AM    B12 Injection ([employer]) Ndc#5003-1065-19    Reviewed

 

                    2015 12:00 AM    THER/PROPH/DIAG INJ SC/IM    Reviewed

 

                    2015 12:00 AM    B12 Injection, Up to 1000 Mcg NDC#9804-8313-08    Reviewed







Results Summary







                          Date and Description      Results

 

                          2004 12:00 AM        Colonoscopy-Women and Men over 50 Normal 

 

                          2008 12:00 AM         Pap Smear Declined 

 

                          10/7/2009 12:00 AM        Cholest Cry Stone Ql .0 %LDLc SerPl-mCnc 123.0 mg/dLHDLc

 SerPl-mCnc 34.0 mg/dLTrigl SerPl-mCnc 190.0 mg/dLGlucose SerPl-mCnc 78.0 mg/dL

 

                          2009 12:00 AM        Mammogram -Women over 40 Normal HIV1+2 Ab Ser Ql no risk 

 

                          2010 8:47 AM         Dexa Bone Scan Refused Aspirin reccommended Contraindication 



 

                          2010 8:48 AM         Depression Done 

 

                          2010 12:00 AM         Foot Exam-Diabetic Done 

 

                          2010 12:00 AM         Cholest Cry Stone Ql .0 %LDLc SerPl-mCnc 126.0 mg/dLGlucose

 SerPl-mCnc 102.0 mg/dL

 

                          2010 8:45 AM          TRIGLYCERIDES 122.0 mg/dLCHOLESTEROL 186.0 mg/dLHDL 36.0 mg/dLTOT

 CHOL/HDL 5.2 LDL (CALC) 126.0 mg/dLGLUCOSE 102.0 mg/dLSODIUM 143.0 
mmol/LPOTASSIUM 3.70 mmol/LCHLORIDE 111.0 mmol/LCO2 23.0 mmol/LBUN 10.0 
mg/dLCREATININE 0.80 mg/dLSGOT/AST 12.0 IU/LSGPT/ALT 11.0 IU/LALK PHOS 65.0 
IU/LTOTAL PROTEIN 7.20 g/dLALBUMIN 3.90 g/dLTOTAL BILI 0.50 mg/dLCALCIUM 10.20 
mg/dLAGE 59 GFR NonAA 73 GFR AA 88 eGFR >60 mL/min/1.73 m2eGFR AA* >60 WBC 5.7 
RBC 3.26 HGB 10.60 g/dLHCT 31.70 %MCV 97.0 fLMCH 32.50 pgMCHC 33.40 g/dLRDW SD 
47 RDW CV 13.30 %MPV 9.70 fLPLT 287 NRBC# 0.00 NRBC% 0.0 %NEUT 62.90 %%LYMP 
21.80 %%MONO 9.90 %%EOS 5.0 %%BASO 0.40 %#NEUT 3.56 #LYMP 1.23 #MONO 0.56 #EOS 
0.28 #BASO 0.02 MANUAL DIFF NOT IND 

 

                          2010 12:00 AM        Glucose SerPl-mCnc 96.0 mg/dLCholest Cry Stone Ql .0 %LDLc

 SerPl-mCnc 146.0 mg/dL

 

                          10/6/2010 12:00 AM        Cholest Cry Stone Ql .0 %LDLc SerPl-mCnc 111.0 mg/dLGlucose

 SerPl-mCnc 81.0 mg/dL

 

                          10/6/2010 2:45 PM         TRIGLYCERIDES 123.0 mg/dLCHOLESTEROL 178.0 mg/dLHDL 42.0 mg/dLTOT

 CHOL/HDL 4.2 LDL (CALC) 111.0 mg/dLGLUCOSE 81.0 mg/dLSODIUM 139.0 
mmol/LPOTASSIUM 4.10 mmol/LCHLORIDE 106.0 mmol/LCO2 24.0 mmol/LBUN 13.0 
mg/dLCREATININE 0.90 mg/dLSGOT/AST 13.0 IU/LSGPT/ALT 11.0 IU/LALK PHOS 61.0 
IU/LTOTAL PROTEIN 7.10 g/dLALBUMIN 3.90 g/dLTOTAL BILI 0.30 mg/dLCALCIUM 9.30 
mg/dLAGE 60 GFR NonAA 64 GFR AA 78 eGFR >60 mL/min/1.73 m2eGFR AA* >60 

 

                          2011 12:00 AM         Mammogram -Women over 40 Ordered 

 

                          2011 10:25 AM        TRIGLYCERIDES 111.0 mg/dLCHOLESTEROL 195.0 mg/dLHDL 43.0 mg/dLTOT

 CHOL/HDL 4.5 LDL (CALC) 130.0 mg/dLWBC 5.3 RBC 3.76 HGB 12.0 g/dLHCT 37.80 %MCV
 101.0 fLMCH 31.90 pgMCHC 31.70 g/dLRDW SD 47 RDW CV 13.0 %MPV 9.70 fLPLT 259 
NRBC# 0.00 NRBC% 0.0 %NEUT 69.0 %%LYMP 17.60 %%MONO 8.30 %%EOS 4.70 %%BASO 0.40 
%#NEUT 3.63 #LYMP 0.93 #MONO 0.44 #EOS 0.25 #BASO 0.02 MANUAL DIFF NOT IND 
GLUCOSE 102.0 mg/dLSODIUM 146.0 mmol/LPOTASSIUM 4.20 mmol/LCHLORIDE 113.0 
mmol/LCO2 23.0 mmol/LBUN 15.0 mg/dLCREATININE 1.0 mg/dLSGOT/AST 12.0 
IU/LSGPT/ALT 17.0 IU/LALK PHOS 60.0 IU/LTOTAL PROTEIN 6.90 g/dLALBUMIN 4.20 
g/dLTOTAL BILI 0.40 mg/dLCALCIUM 9.70 mg/dLAGE 60 GFR NonAA 57 GFR AA 69 eGFR 57
 eGFR AA* >60 

 

                          2011 11:49 AM        Cholest Cry Stone Ql .0 %LDLc SerPl-mCnc 130.0 mg/dLHDLc

 SerPl-mCnc 43.0 mg/dLTrigl SerPl-mCnc 111.0 mg/dLGlucose SerPl-mCnc 102.0 mg/dL

 

                          2011 11:52 AM        Pap Smear Declined 

 

                          2011 11:28 AM        Lithium 2.080 mmol/LGLUCOSE 102.0 mg/dLSODIUM 135.0 mmol/LPOTASSIUM

 3.90 mmol/LCHLORIDE 106.0 mmol/LCO2 21.0 mmol/LBUN 12.0 mg/dLCREATININE 1.30 
mg/dLCALCIUM 10.70 mg/dLAGE 60 GFR NonAA 42 GFR AA 51 eGFR 42 eGFR AA* 51 

 

                          2011 8:58 AM          Lithium 0.690 mmol/L

 

                          2011 2:38 PM         VITAMIN B12 3483.0 pg/mL

 

                          2013 3:35 PM          WBC 5.1 RBC 3.73 HGB 11.70 g/dLHCT 36.40 %MCV 98.0 fLMCH 31.40

 pgMCHC 32.10 g/dLRDW SD 47 RDW CV 13.10 %MPV 9.80 fLPLT 224 NRBC# 0.00 NRBC% 
0.0 %NEUT 66.80 %%LYMP 19.10 %%MONO 9.0 %%EOS 4.90 %%BASO 0.20 %#NEUT 3.42 #LYMP
 0.98 #MONO 0.46 #EOS 0.25 #BASO 0.01 MANUAL DIFF NOT IND GLUCOSE 88.0 
mg/dLSODIUM 141.0 mmol/LPOTASSIUM 4.10 mmol/LCHLORIDE 110.0 mmol/LCO2 22.0 
mmol/LBUN 22.0 mg/dLCREATININE 1.10 mg/dLCALCIUM 9.80 mg/dLAGE 62 GFR NonAA 50 
GFR AA 61 eGFR 50 eGFR AA* 60 Lithium 0.760 mmol/L

 

                          2013 11:02 AM        TRIGLYCERIDES 106.0 mg/dLCHOLESTEROL 181.0 mg/dLHDL 46.0 mg/dLTOT

 CHOL/HDL 3.9 LDL (CALC) 114.0 mg/dLVITAMIN D 41.10 ng/mL

 

                          10/17/2014 10:10 AM       WBC 5.0 RBC 3.66 HGB 11.60 g/dLHCT 36.80 %.0 fLMCH 31.70

 pgMCHC 31.50 g/dLRDW SD 50 RDW CV 13.50 %MPV 10.10 fLPLT 209 NRBC# 0.00 NRBC% 
0.0 %NEUT 69.20 %%LYMP 21.0 %%MONO 6.40 %%EOS 3.20 %%BASO 0.20 %#NEUT 3.46 #LYMP
 1.05 #MONO 0.32 #EOS 0.16 #BASO 0.01 MANUAL DIFF NOT IND GLUCOSE 100.0 
mg/dLSODIUM 148.0 mmol/LPOTASSIUM 3.90 mmol/LCHLORIDE 114.0 mmol/LCO2 26.0 
mmol/LBUN 12.0 mg/dLCREATININE 1.20 mg/dLSGOT/AST 9.0 IU/LSGPT/ALT <6 IU/LALK 
PHOS 82.0 IU/LTOTAL PROTEIN 6.90 g/dLALBUMIN 4.0 g/dLTOTAL BILI 0.40 
mg/dLCALCIUM 10.50 mg/dLAGE 64 GFR NonAA 45 GFR AA 55 eGFR 45 eGFR AA* 55 
TRIGLYCERIDES 96.0 mg/dLCHOLESTEROL 155.0 mg/dLHDL 38.0 mg/dLTOT CHOL/HDL 4.1 
LDL (CALC) 98.0 mg/dLLithium 0.850 mmol/LCancer Antigen (CA) 125 8.30 U/mL

 

                          2015 10:25 AM        Lithium 0.790 mmol/LWBC 4.8 RBC 3.44 HGB 11.0 g/dLHCT 35.20 

%.0 fLMCH 32.0 pgMCHC 31.30 g/dLRDW SD 53 RDW CV 14.0 %MPV 9.30 fLPLT 210
 NRBC# 0.00 NRBC% 0.0 %NEUT 70.80 %%LYMP 17.20 %%MONO 8.10 %%EOS 3.50 %%BASO 
0.40 %#NEUT 3.41 #LYMP 0.83 #MONO 0.39 #EOS 0.17 #BASO 0.02 MANUAL DIFF NOT IND 
TRIGLYCERIDES 107.0 mg/dLCHOLESTEROL 174.0 mg/dLHDL 43.0 mg/dLTOT CHOL/HDL 4.0 
LDL (CALC) 110.0 mg/dLGLUCOSE 90.0 mg/dLSODIUM 145.0 mmol/LPOTASSIUM 3.80 
mmol/LCHLORIDE 115.0 mmol/LCO2 24.0 mmol/LBUN 17.0 mg/dLCREATININE 1.30 
mg/dLSGOT/AST 18.0 IU/LSGPT/ALT 17.0 IU/LALK PHOS 56.0 IU/LTOTAL PROTEIN 6.70 
g/dLALBUMIN 3.90 g/dLTOTAL BILI 0.40 mg/dLCALCIUM 9.80 mg/dLAGE 64 GFR NonAA 41 
GFR AA 50 eGFR 41 eGFR AA* 50 

 

                          3/15/2016 8:08 AM         VITAMIN B12 696.0 pg/mL

 

                          2016 2:09 PM        COLOR YELLOW APPEARANCE CLEAR SPEC GRAV 1.010 pH 5.0 PROTEIN

 30 GLUCOSE NEGATIVE mg/dLKETONE NEGATIVE BILIRUBIN NEGATIVE BLOOD LARGE NITRITE
 NEGATIVE [place] SCREEN MODERATE MICRO INDICATED? SEE BELOW WBC/HPF  RBC/HPF
 20-50 CASTS/LPF NEGATIVE /LPFCRYSTALS NEGATIVE MUCOUS THRDS NEGATIVE BACTERIA 
NEGATIVE EPITH CELLS FEW SQUAMOUS /HPFTRICHOMONAS NEGATIVE YEAST NEGATIVE CULT 
SET UP? YES 

 

                          1/3/2017 4:08 PM          COLOR YELLOW APPEARANCE HAZY SPEC GRAV 1.015 pH 6.0 PROTEIN 30

 GLUCOSE NEGATIVE mg/dLKETONE NEGATIVE BILIRUBIN NEGATIVE BLOOD MODERATE NITRITE
 NEGATIVE [place] SCREEN LARGE MICRO INDICATED? SEE BELOW WBC/-200 RBC/HPF 
5-10 CASTS/LPF NEGATIVE /LPFCRYSTALS NEGATIVE MUCOUS THRDS NEGATIVE BACTERIA 
NEGATIVE EPITH CELLS 1+ SQUAMOUS /HPFTRICHOMONAS NEGATIVE YEAST NEGATIVE CULT 
SET UP? YES 

 

                          2017 4:24 PM         CREATININE 1.50 mg/dLAGE 66 GFR NonAA 35 GFR AA 42 eGFR 35 eGFR

 AA* 42 VITAMIN B12 1324.0 pg/mL

 

                          2017 2:45 PM         COLOR YELLOW APPEARANCE CLEAR SPEC GRAV <=1.005 pH 6.0 PROTEIN

 NEGATIVE GLUCOSE NEGATIVE mg/dLKETONE NEGATIVE BILIRUBIN NEGATIVE BLOOD TRACE-
INTACT NITRITE NEGATIVE [place] SCREEN SMALL MICRO INDICATED? SEE BELOW WBC/HPF 0-5
 RBC/HPF NEGATIVE CASTS/LPF NEGATIVE /LPFCRYSTALS NEGATIVE MUCOUS THRDS NEGATIVE
 BACTERIA FEW EPITH CELLS FEW SQUAMOUS /HPFTRICHOMONAS NEGATIVE YEAST NEGATIVE 
CULT SET UP? YES 

 

                          2017 11:22 AM        COLOR YELLOW APPEARANCE CLEAR SPEC GRAV <=1.005 pH 6.5 PROTEIN

 NEGATIVE GLUCOSE NEGATIVE mg/dLKETONE NEGATIVE BILIRUBIN NEGATIVE BLOOD 
NEGATIVE NITRITE NEGATIVE [place]UK SCREEN NEGATIVE MICRO INDICATED? NOT INDICATED 

 

                          2017 2:18 PM         Clarity Ur cloudy Color Ur dark yellow Glucose Ur-sCnc negative

 Bilirub Ur Ql Strip negative Ketones Ur Ql Strip negative Sp Gr Ur Qn 1.010 Hgb
 Ur Ql Strip trace-intact pH Ur-LsCnc 6.5 Prot Ur Ql Strip trace Urobilinogen 
Ur-mCnc 0.2 Nitrite Ur Ql Strip negative WBC Est Ur Ql Strip large 

 

                          2017 9:28 AM         GLUCOSE 109.0 mg/dLSODIUM 142.0 mmol/LPOTASSIUM 3.60 mmol/LCHLORIDE

 106.0 mmol/LCO2 23.0 mmol/LBUN 11.0 mg/dLCREATININE 1.30 mg/dLCALCIUM 9.80 
mg/dLAGE 66 GFR NonAA 41 GFR AA 50 eGFR 41 eGFR AA* 50 

 

                          2017 9:52 AM         COLOR YELLOW APPEARANCE CLOUDY SPEC GRAV <=1.005 pH 6.5 PROTEIN

 NEGATIVE GLUCOSE NEGATIVE mg/dLKETONE NEGATIVE BILIRUBIN NEGATIVE BLOOD SMALL 
NITRITE POSITIVE [place] SCREEN LARGE MICRO INDICATED? SEE BELOW WBC/-300 
RBC/HPF 5-10 CASTS/LPF NEGATIVE /LPFCRYSTALS NEGATIVE MUCOUS THRDS NEGATIVE 
BACTERIA 2++ EPITH CELLS 1+ SQUAMOUS /HPFTRICHOMONAS NEGATIVE YEAST NEGATIVE 
CULT SET UP? YES 

 

                          2017 10:41 AM         COLOR YELLOW APPEARANCE CLEAR SPEC GRAV <=1.005 pH 6.0 PROTEIN

 NEGATIVE GLUCOSE NEGATIVE mg/dLKETONE NEGATIVE BILIRUBIN NEGATIVE BLOOD 
NEGATIVE NITRITE NEGATIVE [place] SCREEN TRACE MICRO INDICATED? SEE BELOW WBC/HPF 
0-5 RBC/HPF RARE CASTS/LPF NEGATIVE /LPFCRYSTALS NEGATIVE MUCOUS THRDS NEGATIVE 
BACTERIA FEW EPITH CELLS 1+ SQUAMOUS /HPFTRICHOMONAS NEGATIVE YEAST NEGATIVE 
CULT SET UP? NO 

 

                          2017 5:10 AM          GLUCOSE 77.0 mg/dLSODIUM 144.0 mmol/LPOTASSIUM 3.80 mmol/LCHLORIDE

 113.0 mmol/LCO2 24.0 mmol/LBUN 11.0 mg/dLCREATININE 1.0 mg/dLSGOT/AST 14.0 
IU/LSGPT/ALT 11.0 IU/LALK PHOS 68.0 IU/LTOTAL PROTEIN 5.0 g/dLALBUMIN 3.40 
g/dLTOTAL BILI 0.30 mg/dLCALCIUM 8.80 mg/dLAGE 66 GFR NonAA 55 GFR AA 67 eGFR 55
 eGFR AA* >60 CARBAMAZEPINE 4.70 ug/mLWBC 3.3 RBC 3.37 HGB 11.10 g/dLHCT 33.80 
%.0 fLMCH 32.90 pgMCHC 32.80 g/dLRDW SD 47 RDW CV 12.80 %MPV 10.0 fLPLT 
184 NRBC# 0.00 NRBC% 0.0 %NEUT 46.80 %%LYMP 38.50 %%MONO 10.10 %%EOS 4.0 %%BASO 
0.30 %#NEUT 1.53 #LYMP 1.26 #MONO 0.33 #EOS 0.13 #BASO 0.01 MANUAL DIFF NOT IND 
RETIC % 1.180 %RETIC # 3.98 IRF 13.4 VITAMIN B12 242.0 pg/mLFOLATE 8.50 ng/mL







History Of Immunizations







       Name    Date Admin    Mfg Name    Mfg Code    Trade Name    Lot#    Route    Inj    Vis Given    Vis

 Pub                                    CVX

 

        Influenza    2008    Not Entered    NE      Not Entered            Not Entered    Not Entered

                    1            999

 

        X       12/19/2008    [employer]    MSD     PNEUMOVAX 23            Intramuscular    Not Entered

                    1            999

 

           Influenza    10/15/2010    [employer]    NOV        Fluvirin > 12 Years    

[id number]     Intramuscular    Left Deltoid    10/15/2010    2009    999

 

          X         3/23/2015    [facility]    [facility]       PREVNAR 13    [id number]    Intramuscular

                Right Gluteous Medius    3/23/2015       2013       109







History of Past Illness







                    Name                Date of Onset       Comments

 

                    Peritoneal Neoplasm, Malignant                         

 

                    Hyperlipidemia                           

 

                    Bipolar disorder, unspecified                         

 

                    Artificial opening status; colostomy                         

 

                    B12 deficiency                           

 

                    Anemia, Pernicious                         

 

                    Arthritis unspecified                         

 

                    cervical cancer                          

 

                    Artificial opening status; colostomy    2010  1:10PM     

 

                    Bipolar disorder, unspecified    2010  1:10PM     

 

                    Hyperlipidemia      2010  1:10PM     

 

                    Anemia, Pernicious    2010  1:10PM     

 

                    Postoperative Follow-Up    2010  1:55PM     

 

                    Postoperative Follow-Up    Mar  8 2010 10:57AM     

 

                    Artificial opening status; colostomy    Mar  8 2010  1:19PM     

 

                    Peritoneal Neoplasm, Malignant    Mar  8 2010  1:19PM     

 

                    Artificial opening status; colostomy    2010  1:40PM     

 

                    Hyperlipidemia      2010  1:40PM     

 

                    Anemia, Pernicious    2010  1:40PM     

 

                    Peritoneal Neoplasm, Malignant    2010  1:40PM     

 

                    Arthritis unspecified    2010  1:40PM     

 

                    Anemia of Chronic Illness    2010  1:40PM     

 

                    Tinea corporis      2010  3:17PM     

 

                    Bipolar disorder, unspecified    2010  1:33PM     

 

                    Hyperlipidemia      2010  1:33PM     

 

                    Anemia, Pernicious    2010  1:33PM     

 

                    Peritoneal Neoplasm, Malignant    2010  1:33PM     

 

                    B12 deficiency      2010  1:33PM     

 

                    Ethmoidal Sinusitis, Acute    Sep 21 2010  3:53PM     

 

                    Wheezing            Sep 21 2010  3:53PM     

 

                    Flu                 Oct 15 2010  1:40PM     

 

                    Bipolar disorder, unspecified    Oct 15 2010  1:42PM     

 

                    Hyperlipidemia      Oct 15 2010  1:42PM     

 

                    Anemia, Pernicious    Oct 15 2010  1:42PM     

 

                    Peritoneal Neoplasm, Malignant    Oct 15 2010  1:42PM     

 

                    Bipolar disorder, unspecified    2011 12:01PM     

 

                    Hyperlipidemia      2011 12:01PM     

 

                    Anemia, Pernicious    2011 12:01PM     

 

                    Peritoneal Neoplasm, Malignant    2011 12:01PM     

 

                    Bipolar disorder, unspecified    Apr 15 2011 10:55AM     

 

                    Major Depression    2011 10:11AM     

 

                    Bipolar Disorder    2011 10:11AM     

 

                    Cancer              May 10 2011  4:16PM     

 

                    Major Depression    May 10 2011  3:16PM     

 

                    Bipolar Disorder    May 10 2011  3:16PM     

 

                    Hypercalcemia       May 23 2011  2:47PM     

 

                    Bipolar disorder, unspecified    May 23 2011  2:47PM     

 

                    Colon Cancer, Personal History    May 23 2011  2:47PM     

 

                    Bipolar Disorder    May 31 2011  4:39PM     

 

                    Depressive Disorder    2011 10:01AM     

 

                    Vitamin B12 deficiency    2011 10:01AM     

 

                    Vitamin D Deficiency    2011  5:07PM     

 

                    Anemia, Vitamin B12 Deficiency    2011  5:07PM     

 

                    B12 deficiency      2011  3:56PM     

 

                    Routine gynecological examination    Aug  4 2011  9:08AM     

 

                    Screening Examination for Breast Cancer    Aug  4 2011  9:08AM     

 

                    Tinea Corporis      Aug  4 2011  9:08AM     

 

                    Depressive Disorder    Sep 23 2011  8:47AM     

 

                    Contact Dermatitis    Sep 23 2011  8:47AM     

 

                    Anemia, Pernicious    Sep 23 2011  8:47AM     

 

                    B12 deficiency      Sep 23 2011  8:47AM     

 

                    B12 deficiency      Sep 27 2011  2:58PM     

 

                    Renal failure, unspecified                         

 

                    Gout                                     

 

                    Alzheimer disease                         

 

                    dementia                                 

 

                    Hyperlipidemia                           

 

                    History of colon cancer                         

 

                    B12 deficiency      Oct 20 2011  2:34PM     

 

                    Flu                 Dec  9 2011  3:16PM     

 

                    B12 deficiency      Dec  9 2011  3:17PM     

 

                    B12 deficiency      2012  4:52PM     

 

                    B12 deficiency      2012 11:10AM     

 

                    B12 deficiency      2012  3:37PM     

 

                    B12 deficiency      May  3 2012  4:10PM     

 

                    B12 deficiency      2012  2:54PM     

 

                    B12 deficiency      2012 11:23AM     

 

                    B12 deficiency      Aug  9 2012  2:08PM     

 

                    B12 deficiency      Sep  6 2012  4:36PM     

 

                    B12 deficiency      Oct 16 2012 10:23AM     

 

                    Flu                 Feb  2013  3:11PM     

 

                    Bipolar disorder, unspecified    Feb  2013  2:48PM     

 

                    Anemia, Pernicious    Feb  2013  2:48PM     

 

                    B12 deficiency      Feb  4   2:48PM     

 

                    Extrapyramidal abnormal movement disorder    Feb  4   2:48PM     

 

                    B12 deficiency      Apr  3 2013 12:03PM     

 

                    Bipolar disorder, unspecified    May  7 2013  1:31PM     

 

                    Anemia, Pernicious    May  7 2013  1:31PM     

 

                    B12 deficiency      May  7 2013  1:31PM     

 

                    Extrapyramidal abnormal movement disorder    May  7 2013  1:31PM     

 

                    B12 deficiency      2013  3:42PM     

 

                    B12 deficiency      2013  1:31PM     

 

                    Hyperlipidemia      Aug  7 2013 10:37AM     

 

                    Vitamin D Deficiency    Aug  7 2013 10:37AM     

 

                    Bipolar disorder, unspecified    Aug  7 2013 10:37AM     

 

                    Anemia, Pernicious    Aug  7 2013 10:37AM     

 

                    B12 deficiency      Aug  7 2013 10:37AM     

 

                    B12 deficiency      Sep 25 2013 11:15AM     

 

                    B12 deficiency      Dec 11 2013  3:16PM     

 

                    B12 deficiency      Mar  6 2014  1:48PM     

 

                    B12 deficiency      May 21 2014  3:17PM     

 

                    Screening Examination for Breast Cancer    2014  3:23PM     

 

                    Periumbilical abdominal pain    2014  3:23PM     

 

                    B12 deficiency      Jul 10 2014  2:52PM     

 

                    Anemia, Vitamin B12 Deficiency    Aug 13 2014  4:50PM     

 

                    Bipolar disorder    Oct 16 2014 11:13AM     

 

                    Hyperlipidemia      Oct 16 2014 11:13AM     

 

                    Anemia, Pernicious    Oct 16 2014 11:13AM     

 

                    Peritoneal Neoplasm, Malignant    Oct 16 2014 11:13AM     

 

                    Screening breast examination    Oct 16 2014 11:13AM     

 

                    Weight loss         Oct 16 2014 11:13AM     

 

                    Anemia, Pernicious    Mar 23 2015  2:57PM     

 

                    B12 deficiency      Mar 23 2015  2:57PM     

 

                    Need for Prevnar vaccine    Mar 23 2015  2:57PM     

 

                    Bipolar disorder    Mar 23 2015  2:57PM     

 

                    Hyperlipidemia      Mar 23 2015  2:57PM     

 

                    Anemia, Pernicious    Mar 23 2015  2:57PM     

 

                    Peritoneal Neoplasm, Malignant    Mar 23 2015  2:57PM     

 

                    B12 deficiency      May  4 2015  4:48PM     

 

                    Hyperlipidemia      May 13 2015  9:56AM     

 

                    Anemia              May 13 2015  9:56AM     

 

                    Bipolar disorder    May 13 2015  9:56AM     

 

                    Bipolar disorder    May 14 2015  3:27PM     

 

                    Hyperlipidemia      May 14 2015  3:27PM     

 

                    Anemia, Pernicious    May 14 2015  3:27PM     

 

                    Peritoneal Neoplasm, Malignant    May 14 2015  3:27PM     

 

                    B12 deficiency      2015  2:20PM     

 

                    B12 deficiency      2015 11:34AM     

 

                    B12 deficiency      Aug 18 2015  9:06AM     

 

                    Tinea Corporis      Sep 18 2015  8:54AM     

 

                    B12 deficiency      Sep 18 2015  8:54AM     

 

                    B12 deficiency      2015 10:28AM     

 

                    Herpes zoster without complication    Dec  3 2015  9:52AM     

 

                    B12 deficiency      Dec 23 2015 11:21AM     

 

                    B12 deficiency      2016  4:51PM     

 

                    Vitamin B 12 deficiency    Mar 14 2016  5:35PM     

 

                    B12 deficiency      Mar 15 2016 12:14PM     

 

                    B12 deficiency      May  5 2016 11:30AM     

 

                    Edema               May  5 2016 11:30AM     

 

                    Dermatitis          May  5 2016 11:30AM     

 

                    Edema               May 17 2016  8:38AM     

 

                    Shortness of breath    May 17 2016  8:38AM     

 

                    Bilateral edema of lower extremity    2016  2:06PM     

 

                    B12 deficiency      2016  2:06PM     

 

                    B12 deficiency      2016 11:50AM     

 

                    B12 deficiency      2016 11:20AM     

 

                    Diarrhea            Aug  2 2016  3:13PM     

 

                    B12 deficiency      Aug 24 2016 11:10AM     

 

                    Encounter for screening mammogram for breast cancer    Aug 24 2016 11:44AM     

 

                    B12 deficiency      Sep 28 2016  2:35PM     

 

                    B12 deficiency      Dec 15 2016  2:02PM     

 

                    Dysuria             Dec 29 2016 12:14PM     

 

                    Hematuria           Fidencio  3 2017  1:33PM     

 

                    Constipation by delayed colonic transit    2017  1:52PM     

 

                    Ileus               2017  1:52PM     

 

                    UTI (urinary tract infection)    Fidencio 15 2017  3:39PM     

 

                    Acute cystitis with hematuria    2017 11:07AM     

 

                    B12 deficiency      2017 11:07AM     

 

                    B12 deficiency      2017 11:40AM     

 

                    B12 deficiency      2017  4:07PM     

 

                    Slurred speech      2017  3:07PM     

 

                    Vitamin B12 deficiency    2017  3:07PM     

 

                    Dysphagia, unspecified type    2017  3:07PM     

 

                    Hyperlipidemia      2017  3:07PM     

 

                    Dysuria             2017 12:01PM     

 

                    B12 deficiency      2017  2:08PM     

 

                    Dysuria             2017 10:58AM     

 

                    Hematuria           May 22 2017  1:36PM     

 

                    Depressive Disorder    May 22 2017  1:36PM     

 

                    Constipation        May 22 2017  1:36PM     

 

                    Fatigue             May 22 2017  1:36PM     

 

                    Urinary tract infection    May 30 2017  9:36AM     

 

                    Hypokalemia         May 30 2017 12:03PM     

 

                    Urinary tract infection    2017  4:36PM     

 

                    Bipolar disorder, unspecified    Aug 23 2017 11:05AM     

 

                    Anemia, Pernicious    Aug 23 2017 11:05AM     

 

                    B12 deficiency      Aug 23 2017 11:05AM     

 

                    Gingivitis          May 17 2018  2:38PM     

 

                    Colostomy complication    May 17 2018  2:38PM     

 

                    Fatigue             Sep 21 2018  9:36AM     

 

                    Weight loss         Sep 21 2018  9:36AM     







Payers







           Insurance Name    Company Name    Plan Name    Plan Number    Policy Number    Policy Group

 Number                                 Start Date

 

                    Medicare [facility]    Medicare [facility]              [id number]              N/A

 

                          [employer]    [employer]                 [id number]

                                                    

 

                                [facility] Plan [facility]      

                    [id number]                             N/A

 

                    Medicare Part A    Medicare - Lab/Xray              [id number]              2006

 

                    Medicare Part B    [employer] [place]              [id number]              2006

 

                          [employer]    [employer]                 50 percent

                                                    2009

 

                    [facility]              [id number]              N/A

 

                    Medicare Part A    Medicare Part A              [id number]              N/A

 

                    Medicare Part A    Medicare Part A              [id number]              2006









History of Encounters







                    Visit Date          Visit Type          Provider

 

                    2018           Office visit        Bhupinder Aspen DO

 

                    2018           Office visit        Bhupinder Aspen DO

 

                    2017           Hospital            Pranav Angel MD

 

                    2017           Hospital            EARNEST Lopez MD

 

                    2017           Office visit        Bhupinder Aspen DO

 

                    2017           Nurse visit         Bhupinder Aspen DO

 

                    2017           Office visit         

 

                    2017           Office visit        Bhupinder Aspen DO

 

                    2017           Nurse visit         Bhupinder Aspen DO

 

                    2017           Office visit        Radha Ontiveros APRN

 

                    1/15/2017           Office visit        Aj Tapia NP

 

                    2017            Office visit        Devin Masterson MD

 

                    2016          [facility]            Deivn Masterson MD

 

                    12/15/2016          Nurse visit         Bhupinder Aspen DO

 

                    2016           Nurse visit         Bret Forte APRN

 

                    2016           Nurse visit         Bhupinder Aspen DO

 

                    2016            Office visit        Bhupinder Aspen DO

 

                    2016           Nurse visit         Bhupinder Aspen DO

 

                    2016           Office visit        Bret Forte APRN

 

                    2016            Office visit        Bhupinder Aspen DO

 

                    3/15/2016           Nurse visit         Bhupinder Aspen DO

 

                    2016            Nurse visit         Bhupinder Aspen DO

 

                    2015          Nurse visit         Bhupinder Aspen DO

 

                    12/3/2015           Office visit        Bhupinder Aspen DO

 

                    2015          Nurse visit         Bhupinder Aspen DO

 

                    2015           Office visit        Bhupinder Aspen DO

 

                    2015           Nurse visit         Bhupinder Aspen DO

 

                    2015            Nurse visit         Bhupinder Aspen DO

 

                    2015            Nurse visit         Bhupinder Aspen DO

 

                    2015           Office visit        Bhupinder Aspen DO

 

                    2015            Nurse visit         Bhupinder Aspen DO

 

                    3/23/2015           Office visit        Bhupinder Aspen DO

 

                    10/16/2014          Office visit        Bhupinder Aspen DO

 

                    2014           Nurse visit         Radha Ontiveros APRN

 

                    7/10/2014           Nurse visit         Bhupinder Aspen DO

 

                    2014           Office visit        Bhupinder Aspen DO

 

                    2014           Nurse visit         Bhupinder Aspen DO

 

                    3/6/2014            Nurse visit         Bhupinder Aspen DO

 

                    2014            Yasmine Lopez MD

 

                    2013          Nurse visit         Bhupinder Aspen DO

 

                    2013           Nurse visit         Bhupinder Aspen DO

 

                    2013            Office visit        Bhupinder Aspen DO

 

                    2013            Nurse visit         Bhupinder Aspen DO

 

                    2013            Nurse visit         Bhupinder Aspen DO

 

                    2013            Office visit        Bhupinder Aspen DO

 

                    4/3/2013            Nurse visit         Bhupinder Aspen DO

 

                    2013            Office visit        Bhupinder Aspen DO

 

                    10/16/2012          Nurse visit         Bhupinder Aspen DO

 

                    2012            Nurse visit         Bhupinder Aspen DO

 

                    2012            Voided              Bhupinder Aspen DO

 

                    2012            Nurse visit         Bhupinder Aspen DO

 

                    2012            Nurse visit         Bhupinder Aspen DO

 

                    2012           Nurse visit         Bhupinder Aspen DO

 

                    5/3/2012            Nurse visit         Bhupinder Aspen DO

 

                    2012           Nurse visit         Bhupinder Aspen DO

 

                    2012           Nurse visit         Bhupinder Aspen DO

 

                    2012           Nurse visit         Bhupinder Aspen DO

 

                    2011           Nurse visit         Bhupinder Aspen DO

 

                    10/20/2011          Nurse visit         Bhupinder Aspen DO

 

                    2011           Office visit        Bhupinder Aspen DO

 

                    2011           Nurse visit         Radha Ontiveros APRN

 

                    2011            Office visit        Bhupinder Aspen DO

 

                    2011           Nurse visit         Bhupinder Aspen DO

 

                    2011            Office visit        Bhupinder Aspen DO

 

                    2011           Office visit        Bhupinder Aspen DO

 

                    5/10/2011           Office visit        Bhupinder Aspen DO

 

                    2011           Office visit        Bhupinder Aspen DO

 

                    4/15/2011           Office visit        Devin Angel DO

 

                    2011           Office visit        Devin Angel DO

 

                    10/15/2010          Office visit        Devin Angel DO

 

                    2010           Office visit        Devin Angel DO

 

                    2010            Office visit        Devin Angel DO

 

                    2010           Office visit        Devin Angel DO

 

                    2010            Office visit        Devin Angel DO

 

                    3/8/2010            Office visit        Devin Masterson MD

 

                    2010            Surgery             Devin Masterson MD

 

                    2010            Office visit        Devin Angel DO

 

                    2010           Surgery             Devin Masterson MD

 

                    2010           [facility]            Devin Masterson MD

 

                    2010           [facility]            Devin Masterson MD

 

                    10/22/2009          Office visit        Devin Angel DO

## 2019-06-26 NOTE — XMS REPORT
MU2 Ambulatory Summary

                             Created on: 2017



Pauline Gan

External Reference #: [id number]

: 1950

Sex: Female



Demographics







                          Address                   [address]

[place]  [id number]

 

                          Home Phone                [phone]

 

                          Preferred Language        English

 

                          Marital Status            Legally 

 

                          Voodoo Affiliation     Unknown

 

                          Race                      White

 

                          Ethnic Group              Not  or 





Author







                          Author                    Bhupinder Louise

 

                          [facility]

 

                          Address                   [address]

[place]  [id number]



 

                          Phone                     [phone]







Care Team Providers







                    Care Team Member Name    Role                Phone

 

                    Bhupinder Louise    PCP                 Unavailable

 

                    Bhupinder Louise    PreferredProvider    Unavailable







Allergies and Adverse Reactions







                    Name                Reaction            Notes

 

                    NO KNOWN DRUG ALLERGIES                         







Plan of Treatment







             Planned Activity    Comments     Planned Date    Planned Time    Plan/Goal

 

             Injection,Subcutaneous/Intramuscul, [facility]                 2017    12:00 AM      







Medications







                                        Active 

 

             Name         Start Date    Estimated Completion Date    SIG          Comments

 

                Latuda 20 mg oral tablet                                    take 1 tablet (20 mg) by oral route once daily with

 food (at least 350 calories)            

 

             pravastatin 40 mg oral tablet    3/30/2015                 TAKE 1 TABLET BY MOUTH DAILY     

 

                Namenda XR 28 mg oral capsule,sprinkle,ER 24hr    2015                       take 1 capsule (28

 mg) by oral route once daily            

 

                Namenda XR 28 mg oral capsule,sprinkle,ER 24hr    2016                       take 1 capsule (28

 mg) by oral route once daily            

 

                potassium chloride 10 mEq oral tablet extended release    2016                       take 1 tablet

 (10 meq) by oral route once daily       

 

             pravastatin 40 mg oral tablet    2016                 TAKE 1 TABLET BY MOUTH DAILY     

 

                Vitamin B-12 1,000 mcg/mL injection solution    2016                       inject 1 milliliter 

(1,000 mcg) by intramuscular route once a month     

 

                potassium chloride 10 mEq oral tablet extended release    2016                      take 1 tablet

 (10 meq) by oral route once daily       

 

                Namenda XR 28 mg oral capsule,sprinkle,ER 24hr    2016                      TAKE 1 CAPSULE BY

 MOUTH EVERY DAY                         

 

                furosemide 40 mg oral tablet    2016                      take 1 tablet (40 mg) by oral route

 once daily                              

 

                mirtazapine 45 mg oral tablet                                    take 1 tablet (45 mg) by oral route once daily

 before bedtime                          

 

             Fish Oil 300-1,000 mg oral capsule                              take 1 capsule by oral route daily     

 

             Vitamin D3 1,000 unit oral tablet                              take 1 tablet by oral route daily     

 

                Calcium 600 600 mg calcium (1,500 mg) oral tablet                                    take 1 tablet by oral route

 daily                                   

 

                Aspirin Low Dose 81 mg oral tablet,delayed release (DR/EC)                                    take 1 tablet 

(81 mg) by oral route once daily         

 

                metoclopramide HCl 10 mg oral tablet    2017                       take 1 tablet by oral route 

2 times a day for 50 days                

 

             furosemide 40 mg oral tablet    2017                 TAKE 1 TABLET BY MOUTH DAILY     

 

                Namenda XR 28 mg oral capsule,sprinkle,ER 24hr    2017                       TAKE 1 CAPSULE BY 

MOUTH EVERY DAY                          

 

                potassium chloride 10 mEq oral tablet extended release    2017                       TAKE 1 TABLET

 BY MOUTH DAILY                          

 

                Linzess 72 mcg oral capsule    2017                       take 1 capsule (72 mcg) by oral route

 once daily on an empty stomach at least 30 minutes before 1st meal of the day     



 

             pravastatin 40 mg oral tablet    2017                 TAKE 1 TABLET BY MOUTH DAILY     









                                         

 

             Name         Start Date    Expiration Date    SIG          Comments

 

             Reglan 10mg    3/29/2010    2010    one ac and hs     

 

                Keflex 500 mg oral capsule    2010       10/1/2010       take 1 capsule (500 mg) by oral

 route every 6 hours for 10 days         

 

                Bactrim -160 mg oral tablet    2011       take 1 tablet by oral route

 every 12 hours for 7 days               

 

                triamcinolone acetonide 0.1 % topical cream    2011      apply a thin

 layer to the affected area(s) by topical route 2 times per day     

 

                sertraline 100 mg oral tablet    4/10/2012       5/10/2012       take 1.5 tablets by oral route

 daily for 30 days                       

 

                ergocalciferol (vitamin D2) 50,000 unit oral capsule    4/15/2013       2013       TAKE

 ONE CAPSULE BY MOUTH ONCE A WEEK        

 

                CYANOCOBALAM 1000MCGINJ 1000 milliliter    2013       INJECT 1ML INTRAMUSCULAR

 ONCE A MONTH                            

 

                pravastatin 40 mg oral tablet    3/25/2014       3/20/2015       TAKE ONE TABLET BY MOUTH EVERY

 DAY                                     

 

                          Zostavax (PF) 19,400 unit/0.65 mL subcutaneous suspension for reconstitution    3/23/2015

                    3/24/2015           inject 0.65 milliliter by subcutaneous route once     

 

                famciclovir 500 mg oral tablet    12/3/2015       12/10/2015      take 1 tablet (500 mg) by

 oral route every 8 hours for 7 days     

 

                furosemide 40 mg oral tablet    2016      take 1 tablet (40 mg) by oral

 route once daily                        

 

                Cipro 500 mg oral tablet    2016       take 1 tablet (500 mg) by oral route

 2 times per day for 5 days              

 

                Bactrim -160 mg oral tablet    2016        take 1 tablet by oral route

 every 12 hours for 7 days               

 

                metoclopramide HCl 10 mg oral tablet    2017       take 1 tablet by oral

 route 2 times a day for 50 days         

 

                Macrobid 100 mg oral capsule    2017       take 1 capsule (100 mg) by oral

 route 2 times per day with food for 7 days     

 

                Augmentin 875-125 mg oral tablet    2017       take 1 tablet by oral route

 every 12 hours for 7 days               

 

                amoxicillin 500 mg oral tablet    2017       take 1 tablet (500 mg) by oral

 route every 12 hours for 7 days         

 

                cefuroxime axetil 500 mg oral tablet    2017       take 1 tablet (500 mg)

 by oral route 2 times per day for 7 days     

 

                ciprofloxacin HCl 500 mg oral tablet    2017       take 1 tablet (500 mg)

 by oral route every 12 hours for 5 days     









                                        Discontinued 

 

             Name         Start Date    Discontinued Date    SIG          Comments

 

                Tylenol 325 mg oral tablet                    2013        take 1 - 2 tablets (325 -650 mg) by oral

 route every 4-6 hours as needed         

 

                Calcium 600 + D(3) 600 mg(1,500mg) -400 unit oral tablet                    2011       take 1 tablet

 by oral route 2 times a day            no longer taking

 

                Vitamin B-12 1,000 mcg oral tablet extended release    2010       take 1

 tablet by oral route daily             no longer taking

 

                Antifungal (clotrimazole) 1 % topical cream    2010       apply to the 

affected and surrounding areas of skin by topical route 2 times per day morning 
and evening                              

 

                sertraline 100 mg oral tablet    5/10/2011       2011       take 2 tablets (200 mg) by 

oral route once daily                   discontinued by Dr. Serrano

 

                mirtazapine 15 mg oral tablet                    2011        take 1 tablet (15 mg) by oral route 

once daily before bedtime               Dr. Serrano

 

                mirtazapine 15 mg oral tablet                    2011        take 1 tablet (15 mg) by oral route 

once daily before bedtime               dc'd by Dr. Serrano

 

                Pristiq 50 mg oral tablet extended release 24 hr                    2013        take 1 tablet (50

 mg) by oral route once daily           Dr. Serrano

 

                Pristiq 50 mg oral tablet extended release 24 hr                    2013        take 1 tablet (50

 mg) by oral route once daily           dose updated

 

                Vitamin B-12 1,000 mcg/mL injection solution    2011        inject 1 milliliter

 (1,000 mcg) by intramuscular route once a month    on list already

 

                    syringe with needle 1 mL 25 gauge x 1" miscellaneous syringe    2011

                          use for injection once a month     

 

                clotrimazole 1 % topical cream    2011        apply to the affected and surrounding

 areas of skin by topical route 2 times per day in the morning and evening     

 

                Vitamin D2 50,000 unit oral capsule    2011        take 1 capsule (50,000

 unit) by oral route once weekly        generic on list

 

                Pravachol 40 mg oral tablet    2012        take 1 tablet (40 mg) by oral 

route once daily for 90 days            generic on list

 

                lithium carbonate 300 mg oral capsule    2012        take 1 capsule by oral

 route daily                            dose updated

 

                Pristiq 100 mg oral tablet extended release 24 hr                    4/10/2012       take 1 and 1/2 

tablet (150 mg) by oral route once daily    Mental [facility] provider

 

                Pristiq 100 mg oral tablet extended release 24 hr                    4/10/2012       take 1 and 1/2 

tablet (150 mg) by oral route once daily    Discontinued by Dr Efrain Knight at [facility]

 

                hydroxyzine HCl 50 mg oral tablet    10/16/2014      2015       take 1 tablet (50 mg) 

by oral route at bedtime                 

 

                lithium carbonate 300 mg oral capsule    2015       take 1 capsule (300

 mg) by oral route 2 for 30 days         

 

                fluconazole 100 mg oral tablet    2015       12/3/2015       take 1 tablet (100 mg) by 

oral route once a week                   

 

                ketoconazole 2 % topical cream    2015       12/3/2015       apply to the affected area(s)

 by topical route 2 times per day        

 

                prednisone 10 mg oral tablet    12/3/2015       2016        take 2 tablets (20 mg) by oral

 route once daily for 4 days 1 tablet daily for 4 days 0.5 tablet daily for 4 
days                                     

 

                triamcinolone acetonide 0.1 % topical cream    2016       apply a thin layer

 to the affected area(s) by topical route 2 times per day     

 

                Cipro 500 mg oral tablet    1/15/2017       2017       take 1 tablet (500 mg) by oral route

 every 12 hours for 10 days              







Problem List







                    Description         Status              Onset

 

                    Artificial opening status; colostomy    Active               

 

                    Bipolar disorder, unspecified    Active               

 

                    Hyperlipidemia      Active               

 

                    Peritoneal Neoplasm, Malignant    Active               

 

                    Anemia, Pernicious    Active               

 

                    Arthritis unspecified    Active               

 

                    B12 deficiency      Active               







Vital Signs







      Date    Time    BP-Sys(mm[Hg]    BP-Lynn(mm[Hg])    HR(bpm)    RR(rpm)    Temp    WT    HT    HC    BMI

                    BSA                 BMI Percentile      O2 Sat(%)

 

        2017    1:34:00 PM    118 mmHg    62 mmHg    122 bpm    18 rpm    97.8 F    161.375 lbs    

69 in                     23.83 kg/m2    1.89 m2                   96 %

 

        2017    3:05:00 PM    134 mmHg    70 mmHg    70 bpm    20 rpm    97.4 F    181 lbs    69 in

                          26.7288 kg/m    1.9992 m                 98 %

 

        2017    11:07:00 AM    124 mmHg    64 mmHg    62 bpm    17 rpm    98.2 F    181.2 lbs    69

 in                       26.76 kg/m2    2.00 m2                   98 %

 

        1/15/2017    3:34:00 PM    148 mmHg    89 mmHg    69 bpm    20 rpm    98.2 F    179 lbs    69 in

                          26.4334 kg/m    1.9882 m                 98 %

 

       2017    1:51:00 PM    160 mmHg    90 mmHg    100 bpm    20 rpm    96.5 F    179 lbs             

                                                                98 %

 

       2016    3:11:00 PM    134 mmHg    76 mmHg    80 bpm    20 rpm    98 F    163 lbs    69 in     

                24.0706 kg/m    1.8972 m                      98 %

 

        2016    2:04:00 PM    142 mmHg    86 mmHg    68 bpm    16 rpm    98.5 F    166 lbs    63 in

                          29.41 kg/m2    1.83 m2                   100 %

 

        2016    11:27:00 AM    148 mmHg    78 mmHg    90 bpm    20 rpm    98.2 F    153 lbs    69 in

                          22.5939 kg/m    1.8381 m                 96 %

 

        12/3/2015    9:50:00 AM    132 mmHg    70 mmHg    62 bpm    16 rpm    97.9 F    145 lbs    69 in

                          21.41 kg/m2    1.79 m2                   100 %

 

        2015    8:52:00 AM    132 mmHg    68 mmHg    52 bpm    20 rpm    97.8 F    141 lbs    69 in

                          20.8218 kg/m    1.7645 m                 100 %

 

        2015    3:25:00 PM    120 mmHg    62 mmHg    72 bpm    16 rpm    98.1 F    136 lbs    69 in

                          20.08 kg/m2    1.73 m2                   98 %

 

       3/23/2015    2:55:00 PM    130 mmHg    76 mmHg    68 bpm    18 rpm    97 F    140 lbs    69 in    

                20.6742 kg/m    1.7583 m                      98 %

 

        10/16/2014    11:11:00 AM    120 mmHg    66 mmHg    77 bpm    20 rpm    98 F    130 lbs    69 in

                          19.20 kg/m2    1.69 m2                   100 %

 

        2014    3:21:00 PM    130 mmHg    66 mmHg    63 bpm    18 rpm    97.2 F    160 lbs    69 in

                          23.6276 kg/m    1.8797 m                 99 %

 

        2013    10:35:00 AM    132 mmHg    70 mmHg    66 bpm    20 rpm    98.1 F    157 lbs    69 in

                          23.18 kg/m2    1.86 m2                    

 

        2013    1:29:00 PM    132 mmHg    70 mmHg    76 bpm    18 rpm    98.2 F    166 lbs    69 in 

                          24.5137 kg/m    1.9146 m                  

 

       2013    2:46:00 PM    128 mmHg    70 mmHg    76 bpm    16 rpm    98 F    160 lbs    69 in     

                23.63 kg/m2     1.88 m2                          

 

        2011    8:49:00 AM    128 mmHg    78 mmHg    70 bpm    18 rpm    97.9 F    164 lbs    69 in

                          24.2183 kg/m    1.903 m                  

 

     2011    1:31:00 PM    132 mmHg    68 mmHg    84 bpm         97 F    167 lbs                        

                                         

 

        2011    9:09:00 AM    128 mmHg    70 mmHg    72 bpm    18 rpm    98.2 F    163 lbs    64 in 

                          27.9786 kg/m    1.8272 m                  

 

       2011    10:01:00 AM    132 mmHg    70 mmHg    72 bpm    18 rpm    98.2 F    154 lbs             

                                                                 

 

       2011    2:47:00 PM    128 mmHg    70 mmHg    72 bpm    18 rpm    97.8 F    156 lbs             

                                                                 

 

       5/10/2011    3:16:00 PM    144 mmHg    80 mmHg    72 bpm    18 rpm    98.2 F    158 lbs             

                                                                 

 

        2011    10:11:00 AM    132 mmHg    70 mmHg    70 bpm    18 rpm    98.2 F    168 lbs    69 in

                          24.809 kg/m    1.9261 m                  

 

        4/15/2011    10:52:00 AM    110 mmHg    60 mmHg    75 bpm    16 rpm    97.5 F    172.375 lbs    

69 in                     25.46 kg/m2    1.95 m2                   100 %

 

        2011    11:43:00 AM    120 mmHg    82 mmHg    75 bpm    16 rpm    97.2 F    178.5 lbs    69

 in                       26.3596 kg/m    1.9854 m                 100 %

 

        10/15/2010    1:32:00 PM    120 mmHg    70 mmHg    80 bpm    18 rpm    96.6 F    177 lbs    69 in

                          26.14 kg/m2    1.98 m2                   100 %

 

        2010    3:50:00 PM    168 mmHg    100 mmHg    82 bpm    18 rpm    97.8 F    177.5 lbs    69

 in                       26.2119 kg/m    1.9798 m                 97 %

 

        2010    1:21:00 PM    140 mmHg    80 mmHg    59 bpm    16 rpm    97.6 F    173.25 lbs    69 

in                        25.58 kg/m2    1.96 m2                   100 %

 

        2010    3:02:00 PM    140 mmHg    80 mmHg    61 bpm    16 rpm    97.6 F    173.125 lbs    69

 in                       25.5658 kg/m    1.9553 m                 99 %

 

        2010    1:23:00 PM    130 mmHg    80 mmHg    66 bpm    16 rpm    96.8 F    173 lbs    69 in 

                          25.55 kg/m2    1.95 m2                   100 %

 

        2010    12:58:00 PM    130 mmHg    88 mmHg    75 bpm    16 rpm    98.4 F    172.25 lbs    69

 in                       25.4366 kg/m    1.9503 m                 100 %







Social History







                    Name                Description         Comments

 

                    denies alcohol use                         

 

                    denies smoking                           

 

                    Denies illicit substance abuse                         

 

                    retired                                 direct care

 

                    Single                                   

 

                    Exercises regularly                         

 

                    Attended some college                         

 

                    Tobacco             Never smoker         







History of Procedures







                    Date Ordered        Description         Order Status

 

                    2010 12:00 AM    COMPREHEN METABOLIC PANEL    Reviewed

 

                    2010 12:00 AM    COMPLETE CBC W/AUTO DIFF WBC    Reviewed

 

                    2010 12:00 AM    LIPID PANEL         Reviewed

 

                          2015 12:00 AM        B12 Injection, Up to 1000 Mcg NDC#9703-6216-20 [facility] 

                                        Reviewed

 

                    2011 12:00 AM    MAMMOGRAM SCREENING    Reviewed

 

                    2011 12:00 AM    CYTOPATH C/V THIN LAYER    Reviewed

 

                    2011 12:00 AM    B12 Injection 1 cc NDC#[address]-04    Reviewed

 

                    2015 12:00 AM    THER/PROPH/DIAG INJ SC/IM    Reviewed

 

                    2015 12:00 AM    B12 Injection, Up to 1000 Mcg NDC#5323-0741-11    Reviewed

 

                    2011 12:00 AM    THER/PROPH/DIAG INJ SC/IM    Reviewed

 

                    2011 12:00 AM    B12 Injection([place]) Ndc#6624-7734-37-    Reviewed

 

                    2015 12:00 AM    THER/PROPH/DIAG INJ SC/IM    Reviewed

 

                    2015 12:00 AM    B12 Injection, Up to 1000 Mcg NDC#6545-2056-51    Reviewed

 

                    10/20/2011 12:00 AM    THER/PROPH/DIAG INJ SC/IM    Reviewed

 

                    10/20/2011 12:00 AM    B12 Injection([place]ella) Ndc#6667-3764-28-    Reviewed

 

                    2016 12:00 AM    THER/PROPH/DIAG INJ SC/IM    Reviewed

 

                    2016 12:00 AM    B12 Injection, Up to 1000 Mcg NDC#4472-8435-02    Reviewed

 

                    3/14/2016 12:00 AM    VITAMIN B-12        Reviewed

 

                    3/15/2016 12:00 AM    THER/PROPH/DIAG INJ SC/IM    Reviewed

 

                    3/15/2016 12:00 AM    B12 Injection, Up to 1000 Mcg NDC#9207-3727-54    Reviewed

 

                    2011 12:00 AM    ***Immunization administration, Medicare flu    Reviewed

 

                    2011 12:00 AM    Fluzone ** MEDICARE Only **    Reviewed

 

                    2011 12:00 AM    THER/PROPH/DIAG INJ SC/IM    Reviewed

 

                    2011 12:00 AM    B12 Injection ([facility]) Ndc#1915-9917-53    Reviewed

 

                    2016 12:00 AM    B12 Injection, Up to 1000 Mcg NDC#8164-0164-58 [facility] Medicare    

Reviewed

 

                    2016 12:00 AM    TTE W/DOPPLER COMPLETE    Reviewed

 

                    2016 12:00 AM    EXTREMITY STUDY     Reviewed

 

                          2016 12:00 AM        B12 Injection, Up to 1000 Mcg NDC#4557-0355-55 [facility] 

                                        Reviewed

 

                    2016 12:00 AM    THER/PROPH/DIAG INJ SC/IM    Reviewed

 

                    2016 12:00 AM    B12 Injection, Up to 1000 Mcg NDC#9659-0871-13    Reviewed

 

                    2016 12:00 AM    THER/PROPH/DIAG INJ SC/IM    Reviewed

 

                    2012 12:00 AM    B12 Injection([place]) Ndc#1686-9421-41-    Reviewed

 

                    2016 12:00 AM    B12 Injection, Up to 1000 Mcg NDC#8844-4878-33    Reviewed

 

                    2016 12:00 AM    THER/PROPH/DIAG INJ SC/IM    Reviewed

 

                    2012 12:00 AM    THER/PROPH/DIAG INJ SC/IM    Reviewed

 

                    2012 12:00 AM    B12 Injection ([employer]) Ndc#0401-8675-09    Reviewed

 

                    2016 12:00 AM    THER/PROPH/DIAG INJ SC/IM    Reviewed

 

                    2016 12:00 AM    B12 Injection, Up to 1000 Mcg NDC#1910-3710-21    Reviewed

 

                    2016 12:00 AM    B12 Injection, Up to 1000 Mcg NDC#2122-3561-68    Reviewed

 

                    2016 12:00 AM    THER/PROPH/DIAG INJ SC/IM    Reviewed

 

                    2012 12:00 AM    THER/PROPH/DIAG INJ SC/IM    Reviewed

 

                    2012 12:00 AM    B12 Injection([place]ella) Ndc#9241-5263-49-    Reviewed

 

                    12/15/2016 12:00 AM    B12 Injection, Up to 1000 Mcg NDC#0193-0937-35    Reviewed

 

                    12/15/2016 12:00 AM    THER/PROPH/DIAG INJ SC/IM    Reviewed

 

                    2016 12:00 AM    URNLS DIP STICK/TABLET RGNT AUTO W/O MICROSCOPY    Reviewed

 

                    1/3/2017 12:00 AM    URNLS DIP STICK/TABLET RGNT AUTO W/O MICROSCOPY    Reviewed

 

                    2017 12:00 AM    URINE CULTURE/COLONY COUNT    Reviewed

 

                    2017 12:00 AM    Rocephin 1 gram Injection, [facility]    Reviewed

 

                    2017 12:00 AM    THERAPEUTIC PROPHYLACTIC/DX INJECTION SUBQ/IM    Reviewed

 

                    2017 12:00 AM    B12 1000mcg Injection, [facility]    Reviewed

 

                    5/3/2012 12:00 AM    THER/PROPH/DIAG INJ SC/IM    Reviewed

 

                    5/3/2012 12:00 AM    B12 Injection([facility]) Ndc#3113-2696-59-    Reviewed

 

                    2017 12:00 AM    THERAPEUTIC PROPHYLACTIC/DX INJECTION SUBQ/IM    Reviewed

 

                    2017 12:00 AM    B12 1000mcg Injection, [facility] [employer]    Reviewed

 

                    2017 12:00 AM    MRI BRAIN STEM W/O & W/DYE    Reviewed

 

                    2017 12:00 AM    VITAMIN B-12        Reviewed

 

                    2017 12:00 AM    Speech Therapy Consult    Reviewed

 

                    2017 12:00 AM    ASSAY OF CREATININE    Reviewed

 

                    2012 12:00 AM    IMMUNOTHERAPY INJECTIONS    Reviewed

 

                    2012 12:00 AM    B12 Injection([facility]) Ndc#0894-0788-07-    Reviewed

 

                    2017 12:00 AM    URINALYSIS AUTO W/SCOPE    Reviewed

 

                    2012 12:00 AM    THER/PROPH/DIAG INJ SC/IM    Reviewed

 

                    2012 12:00 AM    B12 Injection, Up to 1000 Mcg NDC#4280-6011-98    Reviewed

 

                    2017 12:00 AM    URINALYSIS AUTO W/SCOPE    Reviewed

 

                    2017 2:18 PM    URINALYSIS AUTO W/O SCOPE    Reviewed

 

                    2017 12:00 AM    URINE CULTURE/COLONY COUNT    Reviewed

 

                    2017 12:00 AM    B12 1000mcg Injection    Reviewed

 

                    2012 12:00 AM    THER/PROPH/DIAG INJ SC/IM    Reviewed

 

                    2012 12:00 AM    B12 Injection, Up to 1000 Mcg NDC#9385-7135-86    Reviewed

 

                    2012 12:00 AM    THER/PROPH/DIAG INJ SC/IM    Reviewed

 

                    2012 12:00 AM    B12 Injection, Up to 1000 Mcg NDC#3276-0823-07    Reviewed

 

                    10/16/2012 12:00 AM    THER/PROPH/DIAG INJ SC/IM    Reviewed

 

                    10/16/2012 12:00 AM    B12 Injection, Up to 1000 Mcg NDC#5842-7066-71    Reviewed

 

                    2010 12:00 AM    COMPREHEN METABOLIC PANEL    Reviewed

 

                    2010 12:00 AM    COMPLETE CBC W/AUTO DIFF WBC    Reviewed

 

                    2010 12:00 AM    LIPID PANEL         Reviewed

 

                    2013 12:00 AM    Flu Injection 3 Years And Above NDC# [address]-54  [facility]    Reviewed



 

                    2013 12:00 AM    COMPLETE CBC W/AUTO DIFF WBC    Reviewed

 

                    2013 12:00 AM    ASSAY OF LITHIUM    Reviewed

 

                    2013 12:00 AM    METABOLIC PANEL TOTAL CA    Reviewed

 

                    4/3/2013 12:00 AM    THER/PROPH/DIAG INJ SC/IM    Reviewed

 

                    4/3/2013 12:00 AM    B12 Injection, Up to 1000 Mcg NDC#0069-7973-83    Reviewed

 

                    2013 12:00 AM    THER/PROPH/DIAG INJ SC/IM    Reviewed

 

                    2013 12:00 AM    B12 Injection, Up to 1000 Mcg NDC#1554-1055-68    Reviewed

 

                    2013 12:00 AM    THER/PROPH/DIAG INJ SC/IM    Reviewed

 

                    2013 12:00 AM    B12 Injection, Up to 1000 Mcg NDC#7979-8493-15    Reviewed

 

                    2013 12:00 AM    LIPID PANEL         Reviewed

 

                    2013 12:00 AM    VITAMIN D 25 HYDROXY    Reviewed

 

                    2013 12:00 AM    THER/PROPH/DIAG INJ SC/IM    Reviewed

 

                    2013 12:00 AM    B12 Injection, Up to 1000 Mcg NDC#3695-6703-37    Reviewed

 

                    2013 12:00 AM    THER/PROPH/DIAG INJ SC/IM    Reviewed

 

                    3/6/2014 12:00 AM    THER/PROPH/DIAG INJ SC/IM    Reviewed

 

                    2014 12:00 AM    THER/PROPH/DIAG INJ SC/IM    Reviewed

 

                    2014 12:00 AM    B12 Injection, Up to 1000 Mcg NDC#8004-3446-01    Reviewed

 

                    2010 12:00 AM    SKIN FUNGI CULTURE    Reviewed

 

                    10/9/2010 12:00 AM    COMPREHEN METABOLIC PANEL    Reviewed

 

                    10/9/2010 12:00 AM    LIPID PANEL         Reviewed

 

                    2010 12:00 AM    THER/PROPH/DIAG INJ SC/IM    Reviewed

 

                    2010 12:00 AM    B12 Injection Ndc#[address]-04 ([facility])    Reviewed

 

                    2010 12:00 AM    THER/PROPH/DIAG INJ SC/IM    Reviewed

 

                    2010 12:00 AM    Kenalog 40 Mg Im-Ndc#[address]-98 ([facility])    Reviewed

 

                    10/15/2010 12:00 AM    FLU VACCINE 3 YRS & > IM    Reviewed

 

                    10/15/2010 12:00 AM    Admin.Of M/C Cov.Vaccine-Flu Vacc.    Reviewed

 

                    1/15/2011 12:00 AM    COMPLETE CBC W/AUTO DIFF WBC    Reviewed

 

                    1/15/2011 12:00 AM    COMPREHEN METABOLIC PANEL    Reviewed

 

                    1/15/2011 12:00 AM    LIPID PANEL         Reviewed

 

                    2014 12:00 AM    MAMMOGRAM SCREENING    Reviewed

 

                    2014 12:00 AM    Screening mammography, bilateral    Reviewed

 

                    7/10/2014 12:00 AM    THER/PROPH/DIAG INJ SC/IM    Reviewed

 

                    7/10/2014 12:00 AM    B12 Injection, Up to 1000 Mcg NDC#5406-6572-84    Reviewed

 

                    2011 12:00 AM    COMPLETE CBC W/AUTO DIFF WBC    Reviewed

 

                    2011 12:00 AM    COMPREHEN METABOLIC PANEL    Reviewed

 

                    2011 12:00 AM    LIPID PANEL         Reviewed

 

                    2014 12:00 AM    B12 Injection, Up to 1000 Mcg NDC#0518-8843-04    Reviewed

 

                    10/19/2014 12:00 AM    MAMMOGRAM SCREENING    Reviewed

 

                    10/19/2014 12:00 AM    Screening mammography, bilateral    Reviewed

 

                    10/16/2014 12:00 AM    B12 Injection, Up to 1000 Mcg NDC#9588-0527-81    Reviewed

 

                    10/16/2014 12:00 AM    COMPLETE CBC W/AUTO DIFF WBC    Reviewed

 

                    10/16/2014 12:00 AM    COMPREHEN METABOLIC PANEL    Reviewed

 

                    10/16/2014 12:00 AM    IMMUNOASSAY TUMOR     Reviewed

 

                    10/16/2014 12:00 AM    LIPID PANEL         Reviewed

 

                    10/16/2014 12:00 AM    ASSAY OF LITHIUM    Reviewed

 

                    10/16/2014 12:00 AM    MAMMOGRAM SCREENING    Reviewed

 

                    2011 12:00 AM    ASSAY OF PARATHORMONE    Reviewed

 

                    2011 12:00 AM    VITAMIN D 25 HYDROXY    Reviewed

 

                    2011 12:00 AM    ASSAY OF LITHIUM    Reviewed

 

                    2011 12:00 AM    METABOLIC PANEL TOTAL CA    Reviewed

 

                    2011 12:00 AM    CT HEAD/BRAIN W/O & W/DYE    Reviewed

 

                    3/23/2015 12:00 AM    PNEUMOCOCCAL VACC 13 GLENDY IM    Reviewed

 

                    3/23/2015 12:00 AM    Vitamin B12 injection    Reviewed

 

                    2011 12:00 AM    ASSAY OF LITHIUM    Reviewed

 

                    2011 12:00 AM    B12 Injection Ndc#[address]-34  [place]    Reviewed

 

                    2015 12:00 AM    THER/PROPH/DIAG INJ SC/IM    Reviewed

 

                    2015 12:00 AM    B12 Injection, Up to 1000 Mcg NDC#8154-7622-19    Reviewed

 

                    2015 12:00 AM    COMPLETE CBC W/AUTO DIFF WBC    Reviewed

 

                    2015 12:00 AM    COMPREHEN METABOLIC PANEL    Reviewed

 

                    2015 12:00 AM    LIPID PANEL         Reviewed

 

                    2015 12:00 AM    ASSAY OF LITHIUM    Reviewed

 

                    2011 12:00 AM    VIT D 1 25-DIHYDROXY    Reviewed

 

                    2011 12:00 AM    VITAMIN B-12        Reviewed

 

                    2015 12:00 AM    B12 Injection, Up to 1000 Mcg NDC#9018-8566-87    Reviewed

 

                    2015 12:00 AM    THER/PROPH/DIAG INJ SC/IM    Reviewed

 

                    2015 12:00 AM    B12 Injection, Up to 1000 Mcg NDC#2615-3255-16    Reviewed

 

                    2011 12:00 AM    THER/PROPH/DIAG INJ SC/IM    Reviewed

 

                    2011 12:00 AM    B12 Injection ([facility]) Ndc#4703-7029-78    Reviewed

 

                    2015 12:00 AM    THER/PROPH/DIAG INJ SC/IM    Reviewed

 

                    2015 12:00 AM    B12 Injection, Up to 1000 Mcg NDC#9962-4210-96    Reviewed







Results Summary







                          Date and Description      Results

 

                          2004 12:00 AM        Colonoscopy-Women and Men over 50 Normal 

 

                          2008 12:00 AM         Pap Smear Declined 

 

                          10/7/2009 12:00 AM        Cholest Cry Stone Ql .0 %LDLc SerPl-mCnc 123.0 mg/dLHDLc

 SerPl-mCnc 34.0 mg/dLTrigl SerPl-mCnc 190.0 mg/dLGlucose SerPl-mCnc 78.0 mg/dL

 

                          2009 12:00 AM        Mammogram -Women over 40 Normal HIV1+2 Ab Ser Ql no risk 

 

                          2010 8:47 AM         Dexa Bone Scan Refused Aspirin reccommended Contraindication 



 

                          2010 8:48 AM         Depression Done 

 

                          2010 12:00 AM         Foot Exam-Diabetic Done 

 

                          2010 12:00 AM         Cholest Cry Stone Ql .0 %LDLc SerPl-mCnc 126.0 mg/dLGlucose

 SerPl-mCnc 102.0 mg/dL

 

                          2010 8:45 AM          TRIGLYCERIDES 122.0 mg/dLCHOLESTEROL 186.0 mg/dLHDL 36.0 mg/dLTOT

 CHOL/HDL 5.2 LDL (CALC) 126.0 mg/dLGLUCOSE 102.0 mg/dLSODIUM 143.0 
mmol/LPOTASSIUM 3.70 mmol/LCHLORIDE 111.0 mmol/LCO2 23.0 mmol/LBUN 10.0 
mg/dLCREATININE 0.80 mg/dLSGOT/AST 12.0 IU/LSGPT/ALT 11.0 IU/LALK PHOS 65.0 
IU/LTOTAL PROTEIN 7.20 g/dLALBUMIN 3.90 g/dLTOTAL BILI 0.50 mg/dLCALCIUM 10.20 
mg/dLAGE 59 GFR NonAA 73 GFR AA 88 eGFR >60 mL/min/1.73 m2eGFR AA* >60 WBC 5.7 
RBC 3.26 HGB 10.60 g/dLHCT 31.70 %MCV 97.0 fLMCH 32.50 pgMCHC 33.40 g/dLRDW SD 
47 RDW CV 13.30 %MPV 9.70 fLPLT 287 NRBC# 0.00 NRBC% 0.0 %NEUT 62.90 %%LYMP 
21.80 %%MONO 9.90 %%EOS 5.0 %%BASO 0.40 %#NEUT 3.56 #LYMP 1.23 #MONO 0.56 #EOS 
0.28 #BASO 0.02 MANUAL DIFF NOT IND 

 

                          2010 12:00 AM        Glucose SerPl-mCnc 96.0 mg/dLCholest Cry Stone Ql .0 %LDLc

 SerPl-mCnc 146.0 mg/dL

 

                          2010 8:26 AM         TRIGLYCERIDES 106.0 mg/dLCHOLESTEROL 199.0 mg/dLHDL 32.0 mg/dLTOT

 CHOL/HDL 6.2 LDL (CALC) 146.0 mg/dLGLUCOSE 96.0 mg/dLSODIUM 143.0 
mmol/LPOTASSIUM 4.0 mmol/LCHLORIDE 113.0 mmol/LCO2 24.0 mmol/LBUN 13.0 
mg/dLCREATININE 1.0 mg/dLSGOT/AST 11.0 IU/LSGPT/ALT 6.0 IU/LALK PHOS 56.0 
IU/LTOTAL PROTEIN 6.60 g/dLALBUMIN 3.80 g/dLTOTAL BILI 0.50 mg/dLCALCIUM 9.30 
mg/dLAGE 59 GFR NonAA 57 GFR AA 69 eGFR 57 eGFR AA* >60 

 

                          10/6/2010 12:00 AM        Cholest Cry Stone Ql .0 %LDLc SerPl-mCnc 111.0 mg/dLGlucose

 SerPl-mCnc 81.0 mg/dL

 

                          10/6/2010 2:45 PM         TRIGLYCERIDES 123.0 mg/dLCHOLESTEROL 178.0 mg/dLHDL 42.0 mg/dLTOT

 CHOL/HDL 4.2 LDL (CALC) 111.0 mg/dLGLUCOSE 81.0 mg/dLSODIUM 139.0 
mmol/LPOTASSIUM 4.10 mmol/LCHLORIDE 106.0 mmol/LCO2 24.0 mmol/LBUN 13.0 
mg/dLCREATININE 0.90 mg/dLSGOT/AST 13.0 IU/LSGPT/ALT 11.0 IU/LALK PHOS 61.0 
IU/LTOTAL PROTEIN 7.10 g/dLALBUMIN 3.90 g/dLTOTAL BILI 0.30 mg/dLCALCIUM 9.30 
mg/dLAGE 60 GFR NonAA 64 GFR AA 78 eGFR >60 mL/min/1.73 m2eGFR AA* >60 WBC 6.9 
RBC 3.59 HGB 11.50 g/dLHCT 35.30 %MCV 98.0 fLMCH 32.0 pgMCHC 32.60 g/dLRDW SD 46
 RDW CV 12.90 %MPV 9.90 fLPLT 311 NRBC# 0.00 NRBC% 0.0 %NEUT 64.90 %%LYMP 22.50 
%%MONO 7.20 %%EOS 5.10 %%BASO 0.30 %#NEUT 4.45 #LYMP 1.54 #MONO 0.49 #EOS 0.35 
#BASO 0.02 MANUAL DIFF NOT IND 

 

                          2011 12:00 AM         Mammogram -Women over 40 Ordered 

 

                          2011 10:25 AM        TRIGLYCERIDES 111.0 mg/dLCHOLESTEROL 195.0 mg/dLHDL 43.0 mg/dLTOT

 CHOL/HDL 4.5 LDL (CALC) 130.0 mg/dLWBC 5.3 RBC 3.76 HGB 12.0 g/dLHCT 37.80 %MCV
 101.0 fLMCH 31.90 pgMCHC 31.70 g/dLRDW SD 47 RDW CV 13.0 %MPV 9.70 fLPLT 259 
NRBC# 0.00 NRBC% 0.0 %NEUT 69.0 %%LYMP 17.60 %%MONO 8.30 %%EOS 4.70 %%BASO 0.40 
%#NEUT 3.63 #LYMP 0.93 #MONO 0.44 #EOS 0.25 #BASO 0.02 MANUAL DIFF NOT IND 
GLUCOSE 102.0 mg/dLSODIUM 146.0 mmol/LPOTASSIUM 4.20 mmol/LCHLORIDE 113.0 
mmol/LCO2 23.0 mmol/LBUN 15.0 mg/dLCREATININE 1.0 mg/dLSGOT/AST 12.0 
IU/LSGPT/ALT 17.0 IU/LALK PHOS 60.0 IU/LTOTAL PROTEIN 6.90 g/dLALBUMIN 4.20 
g/dLTOTAL BILI 0.40 mg/dLCALCIUM 9.70 mg/dLAGE 60 GFR NonAA 57 GFR AA 69 eGFR 57
 eGFR AA* >60 

 

                          2011 11:49 AM        Cholest Cry Stone Ql .0 %LDLc SerPl-mCnc 130.0 mg/dLHDLc

 SerPl-mCnc 43.0 mg/dLTrigl SerPl-mCnc 111.0 mg/dLGlucose SerPl-mCnc 102.0 mg/dL

 

                          2011 11:52 AM        Pap Smear Declined 

 

                          2011 11:28 AM        Lithium 2.080 mmol/LGLUCOSE 102.0 mg/dLSODIUM 135.0 mmol/LPOTASSIUM

 3.90 mmol/LCHLORIDE 106.0 mmol/LCO2 21.0 mmol/LBUN 12.0 mg/dLCREATININE 1.30 
mg/dLCALCIUM 10.70 mg/dLAGE 60 GFR NonAA 42 GFR AA 51 eGFR 42 eGFR AA* 51 

 

                          2011 8:58 AM          Lithium 0.690 mmol/L

 

                          2011 2:38 PM         VITAMIN B12 3483.0 pg/mL

 

                          2013 3:35 PM          WBC 5.1 RBC 3.73 HGB 11.70 g/dLHCT 36.40 %MCV 98.0 fLMCH 31.40

 pgMCHC 32.10 g/dLRDW SD 47 RDW CV 13.10 %MPV 9.80 fLPLT 224 NRBC# 0.00 NRBC% 
0.0 %NEUT 66.80 %%LYMP 19.10 %%MONO 9.0 %%EOS 4.90 %%BASO 0.20 %#NEUT 3.42 #LYMP
 0.98 #MONO 0.46 #EOS 0.25 #BASO 0.01 MANUAL DIFF NOT IND GLUCOSE 88.0 
mg/dLSODIUM 141.0 mmol/LPOTASSIUM 4.10 mmol/LCHLORIDE 110.0 mmol/LCO2 22.0 
mmol/LBUN 22.0 mg/dLCREATININE 1.10 mg/dLCALCIUM 9.80 mg/dLAGE 62 GFR NonAA 50 
GFR AA 61 eGFR 50 eGFR AA* 60 Lithium 0.760 mmol/L

 

                          2013 11:02 AM        TRIGLYCERIDES 106.0 mg/dLCHOLESTEROL 181.0 mg/dLHDL 46.0 mg/dLTOT

 CHOL/HDL 3.9 LDL (CALC) 114.0 mg/dLVITAMIN D 41.10 ng/mL

 

                          10/17/2014 10:10 AM       WBC 5.0 RBC 3.66 HGB 11.60 g/dLHCT 36.80 %.0 fLMCH 31.70

 pgMCHC 31.50 g/dLRDW SD 50 RDW CV 13.50 %MPV 10.10 fLPLT 209 NRBC# 0.00 NRBC% 
0.0 %NEUT 69.20 %%LYMP 21.0 %%MONO 6.40 %%EOS 3.20 %%BASO 0.20 %#NEUT 3.46 #LYMP
 1.05 #MONO 0.32 #EOS 0.16 #BASO 0.01 MANUAL DIFF NOT IND GLUCOSE 100.0 
mg/dLSODIUM 148.0 mmol/LPOTASSIUM 3.90 mmol/LCHLORIDE 114.0 mmol/LCO2 26.0 
mmol/LBUN 12.0 mg/dLCREATININE 1.20 mg/dLSGOT/AST 9.0 IU/LSGPT/ALT <6 IU/LALK 
PHOS 82.0 IU/LTOTAL PROTEIN 6.90 g/dLALBUMIN 4.0 g/dLTOTAL BILI 0.40 
mg/dLCALCIUM 10.50 mg/dLAGE 64 GFR NonAA 45 GFR AA 55 eGFR 45 eGFR AA* 55 
TRIGLYCERIDES 96.0 mg/dLCHOLESTEROL 155.0 mg/dLHDL 38.0 mg/dLTOT CHOL/HDL 4.1 
LDL (CALC) 98.0 mg/dLLithium 0.850 mmol/LCancer Antigen (CA) 125 8.30 U/mL

 

                          2015 10:25 AM        Lithium 0.790 mmol/LWBC 4.8 RBC 3.44 HGB 11.0 g/dLHCT 35.20 

%.0 fLMCH 32.0 pgMCHC 31.30 g/dLRDW SD 53 RDW CV 14.0 %MPV 9.30 fLPLT 210
 NRBC# 0.00 NRBC% 0.0 %NEUT 70.80 %%LYMP 17.20 %%MONO 8.10 %%EOS 3.50 %%BASO 
0.40 %#NEUT 3.41 #LYMP 0.83 #MONO 0.39 #EOS 0.17 #BASO 0.02 MANUAL DIFF NOT IND 
TRIGLYCERIDES 107.0 mg/dLCHOLESTEROL 174.0 mg/dLHDL 43.0 mg/dLTOT CHOL/HDL 4.0 
LDL (CALC) 110.0 mg/dLGLUCOSE 90.0 mg/dLSODIUM 145.0 mmol/LPOTASSIUM 3.80 
mmol/LCHLORIDE 115.0 mmol/LCO2 24.0 mmol/LBUN 17.0 mg/dLCREATININE 1.30 
mg/dLSGOT/AST 18.0 IU/LSGPT/ALT 17.0 IU/LALK PHOS 56.0 IU/LTOTAL PROTEIN 6.70 
g/dLALBUMIN 3.90 g/dLTOTAL BILI 0.40 mg/dLCALCIUM 9.80 mg/dLAGE 64 GFR NonAA 41 
GFR AA 50 eGFR 41 eGFR AA* 50 

 

                          2015 8:50 AM        WBC 5.8 RBC 3.29 HGB 10.70 g/dLHCT 34.0 %.0 fLMCH 32.50

 pgMCHC 31.50 g/dLRDW SD 52 RDW CV 13.60 %MPV 9.60 fLPLT 223 NRBC# 0.00 NRBC% 
0.0 %NEUT 69.60 %%LYMP 18.90 %%MONO 8.50 %%EOS 2.80 %%BASO 0.20 %#NEUT 4.03 
#LYMP 1.09 #MONO 0.49 #EOS 0.16 #BASO 0.01 MANUAL DIFF NOT IND Lithium 0.620 
mmol/LGLUCOSE 83.0 mg/dLSODIUM 139.0 mmol/LPOTASSIUM 3.90 mmol/LCHLORIDE 109.0 
mmol/LCO2 22.0 mmol/LBUN 19.0 mg/dLCREATININE 1.40 mg/dLSGOT/AST 19.0 
IU/LSGPT/ALT 21.0 IU/LALK PHOS 55.0 IU/LTOTAL PROTEIN 6.50 g/dLALBUMIN 3.90 
g/dLTOTAL BILI 0.50 mg/dLCALCIUM 9.60 mg/dLAGE 65 GFR NonAA 38 GFR AA 46 eGFR 38
 eGFR AA* 46 TRIGLYCERIDES 121.0 mg/dLCHOLESTEROL 192.0 mg/dLHDL 51.0 mg/dLTOT 
CHOL/HDL 3.8 .0 mg/dLFREE T4 0.79 TSH 1.210 uIU/mLHemoglobin A1c 5.40 
%Estim. Avg Glu (eAG) 108 

 

                          3/15/2016 8:08 AM         VITAMIN B12 696.0 pg/mL

 

                          3/23/2016 8:26 AM         WBC 7.0 RBC 3.61 HGB 11.80 g/dLHCT 37.70 %.0 fLMCH 32.70

 pgMCHC 31.30 g/dLRDW SD 49 RDW CV 12.50 %MPV 10.0 fLPLT 207 NRBC# 0.00 NRBC% 
0.0 %NEUT 73.60 %%LYMP 16.40 %%MONO 6.60 %%EOS 3.0 %%BASO 0.30 %#NEUT 5.15 #LYMP
 1.15 #MONO 0.46 #EOS 0.21 #BASO 0.02 MANUAL DIFF NOT IND Lithium 0.940 
mmol/LGLUCOSE 108.0 mg/dLSODIUM 143.0 mmol/LPOTASSIUM 4.30 mmol/LCHLORIDE 110.0 
mmol/LCO2 27.0 mmol/LBUN 16.0 mg/dLCREATININE 1.60 mg/dLSGOT/AST 13.0 
IU/LSGPT/ALT 7.0 IU/LALK PHOS 71.0 IU/LTOTAL PROTEIN 6.80 g/dLALBUMIN 4.0 
g/dLTOTAL BILI 0.20 mg/dLCALCIUM 10.40 mg/dLAGE 65 GFR NonAA 32 GFR AA 39 eGFR 
32 eGFR AA* 39 TRIGLYCERIDES 113.0 mg/dLCHOLESTEROL 169.0 mg/dLHDL 42.0 mg/dLTOT
 CHOL/HDL 4.0 LDL (CALC) 104.0 mg/dLFREE T4 0.86 TSH 2.20 uIU/mLHemoglobin A1c 
5.20 %Estim. Avg Glu (eAG) 103 

 

                          3/25/2016 9:17 AM         COLOR YELLOW APPEARANCE CLEAR SPEC GRAV 1.010 pH 7.0 PROTEIN 

NEGATIVE GLUCOSE NEGATIVE mg/dLKETONE NEGATIVE BILIRUBIN NEGATIVE BLOOD NEGATIVE
 NITRITE NEGATIVE [place] SCREEN SMALL MICRO IND? SEE BELOW WBC/HPF 0-5 RBC/HPF 
NEGATIVE CASTS/LPF NEGATIVE /LPFCRYSTALS NEGATIVE MUCOUS THRDS NEGATIVE BACTERIA
 NEGATIVE EPITH CELLS FEW SQUAMOUS /HPFTRICHOMONAS NEGATIVE YEAST NEGATIVE 

 

                          2016 6:00 AM        GLUCOSE 91.0 mg/dLSODIUM 143.0 mmol/LPOTASSIUM 3.60 mmol/LCHLORIDE

 112.0 mmol/LCO2 23.0 mmol/LBUN 22.0 mg/dLCREATININE 1.20 mg/dLSGOT/AST 15.0 
IU/LSGPT/ALT 12.0 IU/LALK PHOS 61.0 IU/LTOTAL PROTEIN 5.40 g/dLALBUMIN 3.10 
g/dLTOTAL BILI 0.40 mg/dLCALCIUM 8.40 mg/dLAGE 66 GFR NonAA 45 GFR AA 55 eGFR 45
 eGFR AA* 55 WBC 3.0 RBC 3.05 HGB 9.80 g/dLHCT 32.10 %.0 fLMCH 32.10 
pgMCHC 30.50 g/dLRDW SD 54 RDW CV 14.20 %MPV 10.10 fLPLT 170 NRBC# 0.00 NRBC% 
0.0 %NEUT 50.70 %%LYMP 32.60 %%MONO 10.50 %%EOS 5.90 %%BASO 0.30 %#NEUT 1.54 
#LYMP 0.99 #MONO 0.32 #EOS 0.18 #BASO 0.01 MANUAL DIFF NOT IND 

 

                          2016 2:09 PM        COLOR YELLOW APPEARANCE CLEAR SPEC GRAV 1.010 pH 5.0 PROTEIN

 30 GLUCOSE NEGATIVE mg/dLKETONE NEGATIVE BILIRUBIN NEGATIVE BLOOD LARGE NITRITE
 NEGATIVE [place] SCREEN MODERATE MICRO INDICATED? SEE BELOW WBC/HPF  RBC/HPF
 20-50 CASTS/LPF NEGATIVE /LPFCRYSTALS NEGATIVE MUCOUS THRDS NEGATIVE BACTERIA 
NEGATIVE EPITH CELLS FEW SQUAMOUS /HPFTRICHOMONAS NEGATIVE YEAST NEGATIVE CULT 
SET UP? YES 

 

                          1/3/2017 4:08 PM          COLOR YELLOW APPEARANCE HAZY SPEC GRAV 1.015 pH 6.0 PROTEIN 30

 GLUCOSE NEGATIVE mg/dLKETONE NEGATIVE BILIRUBIN NEGATIVE BLOOD MODERATE NITRITE
 NEGATIVE [place] SCREEN LARGE MICRO INDICATED? SEE BELOW WBC/-200 RBC/HPF 
5-10 CASTS/LPF NEGATIVE /LPFCRYSTALS NEGATIVE MUCOUS THRDS NEGATIVE BACTERIA 
NEGATIVE EPITH CELLS 1+ SQUAMOUS /HPFTRICHOMONAS NEGATIVE YEAST NEGATIVE CULT 
SET UP? YES 

 

                          2017 4:24 PM         CREATININE 1.50 mg/dLAGE 66 GFR NonAA 35 GFR AA 42 eGFR 35 eGFR

 AA* 42 VITAMIN B12 1324.0 pg/mL

 

                          2017 11:30 AM         GLUCOSE 93.0 mg/dLSODIUM 143.0 mmol/LPOTASSIUM 3.10 mmol/LCHLORIDE

 101.0 mmol/LCO2 29.0 mmol/LBUN 26.0 mg/dLCREATININE 1.50 mg/dLSGOT/AST 23.0 
IU/LSGPT/ALT 13.0 IU/LALK PHOS 66.0 IU/LTOTAL PROTEIN 7.70 g/dLALBUMIN 4.30 
g/dLTOTAL BILI 0.40 mg/dLCALCIUM 10.30 mg/dLAGE 66 GFR NonAA 35 GFR AA 42 eGFR 
35 eGFR AA* 42 TRIGLYCERIDES 147.0 mg/dLCHOLESTEROL 184.0 mg/dLHDL 44.0 mg/dLTOT
 CHOL/HDL 4.2 .0 mg/dLWBC 5.4 RBC 3.98 HGB 12.90 g/dLHCT 40.20 %.0
 fLMCH 32.40 pgMCHC 32.10 g/dLRDW SD 50 RDW CV 13.50 %MPV 9.30 fLPLT 210 NRBC# 
0.00 NRBC% 0.0 %NEUT 54.20 %%LYMP 30.70 %%MONO 9.10 %%EOS 5.20 %%BASO 0.40 
%#NEUT 2.94 #LYMP 1.66 #MONO 0.49 #EOS 0.28 #BASO 0.02 MANUAL DIFF NOT IND FREE 
T4 1.09 COLOR YELLOW APPEARANCE CLEAR SPEC GRAV <=1.005 pH 5.5 PROTEIN NEGATIVE 
GLUCOSE NEGATIVE mg/dLKETONE NEGATIVE BILIRUBIN NEGATIVE BLOOD NEGATIVE NITRITE 
NEGATIVE [place] SCREEN LARGE MICRO INDICATED? SEE BELOW WBC/HPF 10-20 RBC/HPF 0-5 
CASTS/LPF NEGATIVE /LPFCRYSTALS NEGATIVE MUCOUS THRDS NEGATIVE BACTERIA FEW 
EPITH CELLS 1+ SQUAMOUS /HPFTRICHOMONAS NEGATIVE YEAST NEGATIVE CULT SET UP? YES
 TSH 1.820 uIU/mL

 

                          2017 2:45 PM         COLOR YELLOW APPEARANCE CLEAR SPEC GRAV <=1.005 pH 6.0 PROTEIN

 NEGATIVE GLUCOSE NEGATIVE mg/dLKETONE NEGATIVE BILIRUBIN NEGATIVE BLOOD TRACE-
INTACT NITRITE NEGATIVE [place] SCREEN SMALL MICRO INDICATED? SEE BELOW WBC/HPF 0-5
 RBC/HPF NEGATIVE CASTS/LPF NEGATIVE /LPFCRYSTALS NEGATIVE MUCOUS THRDS NEGATIVE
 BACTERIA FEW EPITH CELLS FEW SQUAMOUS /HPFTRICHOMONAS NEGATIVE YEAST NEGATIVE 
CULT SET UP? YES 

 

                          2017 11:22 AM        COLOR YELLOW APPEARANCE CLEAR SPEC GRAV <=1.005 pH 6.5 PROTEIN

 NEGATIVE GLUCOSE NEGATIVE mg/dLKETONE NEGATIVE BILIRUBIN NEGATIVE BLOOD 
NEGATIVE NITRITE NEGATIVE LEUK SCREEN NEGATIVE MICRO INDICATED? NOT INDICATED 

 

                          2017 2:18 PM         Clarity Ur cloudy Color Ur dark yellow Glucose Ur-sCnc negative

 Bilirub Ur Ql Strip negative Ketones Ur Ql Strip negative Sp Gr Ur Qn 1.010 Hgb
 Ur Ql Strip trace-intact pH Ur-LsCnc 6.5 Prot Ur Ql Strip trace Urobilinogen 
Ur-mCnc 0.2 Nitrite Ur Ql Strip negative WBC Est Ur Ql Strip large 







History Of Immunizations







       Name    Date Admin    Mfg Name    Mfg Code    Trade Name    Lot#    Route    Inj    Vis Given    Vis

 Pub                                    CVX

 

        Influenza    2008    Not Entered    NE      Not Entered            Not Entered    Not Entered

                    1            999

 

        X       12/19/2008    [employer]    MSD     Pneumovax 23            Intramuscular    Not Entered

                    1            999

 

           Influenza    10/15/2010    [employer]    NOV        Fluvirin > 12 Years    

[id number]     Intramuscular    Left Deltoid    10/15/2010    2009    999

 

          X         3/23/2015    [facility]    [facility]       Prevnar 13    [id number]    Intramuscular

                Right Gluteous Medius    3/23/2015       2013       109







History of Past Illness







                    Name                Date of Onset       Comments

 

                    Peritoneal Neoplasm, Malignant                         

 

                    Hyperlipidemia                           

 

                    Bipolar disorder, unspecified                         

 

                    Artificial opening status; colostomy                         

 

                    B12 deficiency                           

 

                    Anemia, Pernicious                         

 

                    Arthritis unspecified                         

 

                    cervical cancer                          

 

                    Artificial opening status; colostomy    2010  1:10PM     

 

                    Bipolar disorder, unspecified    2010  1:10PM     

 

                    Hyperlipidemia      2010  1:10PM     

 

                    Anemia, Pernicious    2010  1:10PM     

 

                    Postoperative Follow-Up    2010  1:55PM     

 

                    Postoperative Follow-Up    Mar  8 2010 10:57AM     

 

                    Artificial opening status; colostomy    Mar  8 2010  1:19PM     

 

                    Peritoneal Neoplasm, Malignant    Mar  8 2010  1:19PM     

 

                    Artificial opening status; colostomy    2010  1:40PM     

 

                    Hyperlipidemia      2010  1:40PM     

 

                    Anemia, Pernicious    2010  1:40PM     

 

                    Peritoneal Neoplasm, Malignant    2010  1:40PM     

 

                    Arthritis unspecified    2010  1:40PM     

 

                    Anemia of Chronic Illness    2010  1:40PM     

 

                    Tinea corporis      2010  3:17PM     

 

                    Bipolar disorder, unspecified    2010  1:33PM     

 

                    Hyperlipidemia      2010  1:33PM     

 

                    Anemia, Pernicious    2010  1:33PM     

 

                    Peritoneal Neoplasm, Malignant    2010  1:33PM     

 

                    B12 deficiency      2010  1:33PM     

 

                    Ethmoidal Sinusitis, Acute    Sep 21 2010  3:53PM     

 

                    Wheezing            Sep 21 2010  3:53PM     

 

                    Flu                 Oct 15 2010  1:40PM     

 

                    Bipolar disorder, unspecified    Oct 15 2010  1:42PM     

 

                    Hyperlipidemia      Oct 15 2010  1:42PM     

 

                    Anemia, Pernicious    Oct 15 2010  1:42PM     

 

                    Peritoneal Neoplasm, Malignant    Oct 15 2010  1:42PM     

 

                    Bipolar disorder, unspecified    2011 12:01PM     

 

                    Hyperlipidemia      2011 12:01PM     

 

                    Anemia, Pernicious    2011 12:01PM     

 

                    Peritoneal Neoplasm, Malignant    2011 12:01PM     

 

                    Bipolar disorder, unspecified    Apr 15 2011 10:55AM     

 

                    Major Depression    2011 10:11AM     

 

                    Bipolar Disorder    2011 10:11AM     

 

                    Cancer              May 10 2011  4:16PM     

 

                    Major Depression    May 10 2011  3:16PM     

 

                    Bipolar Disorder    May 10 2011  3:16PM     

 

                    Hypercalcemia       May 23 2011  2:47PM     

 

                    Bipolar disorder, unspecified    May 23 2011  2:47PM     

 

                    Colon Cancer, Personal History    May 23 2011  2:47PM     

 

                    Bipolar Disorder    May 31 2011  4:39PM     

 

                    Depressive Disorder    2011 10:01AM     

 

                    Vitamin B12 deficiency    2011 10:01AM     

 

                    Vitamin D Deficiency    2011  5:07PM     

 

                    Anemia, Vitamin B12 Deficiency    2011  5:07PM     

 

                    B12 deficiency      2011  3:56PM     

 

                    Routine gynecological examination    Aug  4 2011  9:08AM     

 

                    Screening Examination for Breast Cancer    Aug  4 2011  9:08AM     

 

                    Tinea Corporis      Aug  4 2011  9:08AM     

 

                    Depressive Disorder    Sep 23 2011  8:47AM     

 

                    Contact Dermatitis    Sep 23 2011  8:47AM     

 

                    Anemia, Pernicious    Sep 23 2011  8:47AM     

 

                    B12 deficiency      Sep 23 2011  8:47AM     

 

                    B12 deficiency      Sep 27 2011  2:58PM     

 

                    B12 deficiency      Oct 20 2011  2:34PM     

 

                    Flu                 Dec  9 2011  3:16PM     

 

                    B12 deficiency      Dec  9 2011  3:17PM     

 

                    B12 deficiency      2012  4:52PM     

 

                    B12 deficiency      2012 11:10AM     

 

                    B12 deficiency      2012  3:37PM     

 

                    B12 deficiency      May  3 2012  4:10PM     

 

                    B12 deficiency      2012  2:54PM     

 

                    B12 deficiency      2012 11:23AM     

 

                    B12 deficiency      Aug  9 2012  2:08PM     

 

                    B12 deficiency      Sep  6 2012  4:36PM     

 

                    B12 deficiency      Oct 16 2012 10:23AM     

 

                    Flu                 b  2013  3:11PM     

 

                    Bipolar disorder, unspecified    Feb  2013  2:48PM     

 

                    Anemia, Pernicious    Feb  2013  2:48PM     

 

                    B12 deficiency      Feb  4   2:48PM     

 

                    Extrapyramidal abnormal movement disorder    Feb  4   2:48PM     

 

                    B12 deficiency      Apr  3 2013 12:03PM     

 

                    Bipolar disorder, unspecified    May  7 2013  1:31PM     

 

                    Anemia, Pernicious    May  7 2013  1:31PM     

 

                    B12 deficiency      May  7 2013  1:31PM     

 

                    Extrapyramidal abnormal movement disorder    May  7 2013  1:31PM     

 

                    B12 deficiency      2013  3:42PM     

 

                    B12 deficiency      2013  1:31PM     

 

                    Hyperlipidemia      Aug  7 2013 10:37AM     

 

                    Vitamin D Deficiency    Aug  7 2013 10:37AM     

 

                    Bipolar disorder, unspecified    Aug  7 2013 10:37AM     

 

                    Anemia, Pernicious    Aug  7 2013 10:37AM     

 

                    B12 deficiency      Aug  7 2013 10:37AM     

 

                    B12 deficiency      Sep 25 2013 11:15AM     

 

                    B12 deficiency      Dec 11 2013  3:16PM     

 

                    B12 deficiency      Mar  6 2014  1:48PM     

 

                    B12 deficiency      May 21 2014  3:17PM     

 

                    Screening Examination for Breast Cancer    2014  3:23PM     

 

                    Periumbilical abdominal pain    2014  3:23PM     

 

                    B12 deficiency      Jul 10 2014  2:52PM     

 

                    Anemia, Vitamin B12 Deficiency    Aug 13 2014  4:50PM     

 

                    Bipolar disorder    Oct 16 2014 11:13AM     

 

                    Hyperlipidemia      Oct 16 2014 11:13AM     

 

                    Anemia, Pernicious    Oct 16 2014 11:13AM     

 

                    Peritoneal Neoplasm, Malignant    Oct 16 2014 11:13AM     

 

                    Screening breast examination    Oct 16 2014 11:13AM     

 

                    Weight loss         Oct 16 2014 11:13AM     

 

                    Anemia, Pernicious    Mar 23 2015  2:57PM     

 

                    B12 deficiency      Mar 23 2015  2:57PM     

 

                    Need for Prevnar vaccine    Mar 23 2015  2:57PM     

 

                    Bipolar disorder    Mar 23 2015  2:57PM     

 

                    Hyperlipidemia      Mar 23 2015  2:57PM     

 

                    Anemia, Pernicious    Mar 23 2015  2:57PM     

 

                    Peritoneal Neoplasm, Malignant    Mar 23 2015  2:57PM     

 

                    B12 deficiency      May  4 2015  4:48PM     

 

                    Hyperlipidemia      May 13 2015  9:56AM     

 

                    Anemia              May 13 2015  9:56AM     

 

                    Bipolar disorder    May 13 2015  9:56AM     

 

                    Bipolar disorder    May 14 2015  3:27PM     

 

                    Hyperlipidemia      May 14 2015  3:27PM     

 

                    Anemia, Pernicious    May 14 2015  3:27PM     

 

                    Peritoneal Neoplasm, Malignant    May 14 2015  3:27PM     

 

                    B12 deficiency      2015  2:20PM     

 

                    B12 deficiency      2015 11:34AM     

 

                    B12 deficiency      Aug 18 2015  9:06AM     

 

                    Tinea Corporis      Sep 18 2015  8:54AM     

 

                    B12 deficiency      Sep 18 2015  8:54AM     

 

                    B12 deficiency      2015 10:28AM     

 

                    Herpes zoster without complication    Dec  3 2015  9:52AM     

 

                    B12 deficiency      Dec 23 2015 11:21AM     

 

                    B12 deficiency      2016  4:51PM     

 

                    Vitamin B 12 deficiency    Mar 14 2016  5:35PM     

 

                    B12 deficiency      Mar 15 2016 12:14PM     

 

                    B12 deficiency      May  5 2016 11:30AM     

 

                    Edema               May  5 2016 11:30AM     

 

                    Dermatitis          May  5 2016 11:30AM     

 

                    Edema               May 17 2016  8:38AM     

 

                    Shortness of breath    May 17 2016  8:38AM     

 

                    Bilateral edema of lower extremity    2016  2:06PM     

 

                    B12 deficiency      2016  2:06PM     

 

                    B12 deficiency      2016 11:50AM     

 

                    B12 deficiency      2016 11:20AM     

 

                    Diarrhea            Aug  2 2016  3:13PM     

 

                    B12 deficiency      Aug 24 2016 11:10AM     

 

                    Encounter for screening mammogram for breast cancer    Aug 24 2016 11:44AM     

 

                    B12 deficiency      Sep 28 2016  2:35PM     

 

                    B12 deficiency      Dec 15 2016  2:02PM     

 

                    Dysuria             Dec 29 2016 12:14PM     

 

                    Hematuria           Fidencio  3 2017  1:33PM     

 

                    Constipation by delayed colonic transit    2017  1:52PM     

 

                    Ileus               2017  1:52PM     

 

                    UTI (urinary tract infection)    Fidencio 15 2017  3:39PM     

 

                    Acute cystitis with hematuria    2017 11:07AM     

 

                    B12 deficiency      2017 11:07AM     

 

                    B12 deficiency      2017 11:40AM     

 

                    B12 deficiency      2017  4:07PM     

 

                    Slurred speech      2017  3:07PM     

 

                    Vitamin B12 deficiency    2017  3:07PM     

 

                    Dysphagia, unspecified type    2017  3:07PM     

 

                    Hyperlipidemia      2017  3:07PM     

 

                    Dysuria             2017 12:01PM     

 

                    B12 deficiency      2017  2:08PM     

 

                    Dysuria             2017 10:58AM     

 

                    Hematuria           May 22 2017  1:36PM     

 

                    Depressive Disorder    May 22 2017  1:36PM     

 

                    Constipation        May 22 2017  1:36PM     

 

                    Fatigue             May 22 2017  1:36PM     







Payers







           Insurance Name    Company Name    Plan Name    Plan Number    Policy Number    Policy Group

 Number                                 Start Date

 

                    Medicare RHC Medicare RHC              [id number]              N/A

 

                          [employer]    [employer]                 [id number]

                                                    

 

                    Medicare Part A    Medicare - Lab/Xray              [id number]              2006

 

                    Medicare Part B    Medic[employer] [place]              [id number]              2006

 

                          [employer]    [employer]                 50 percent

                                                    2009

 

                    Human    [employer]              [id number]              N/A

 

                    Medicare Part A    Medicare Part A              [id number]              N/A

 

                    Medicare Part A    Medicare Part A              [id number]              2006









History of Encounters







                    Visit Date          Visit Type          Provider

 

                    2017           Office visit        Bhupinder Aspen DO

 

                    2017           Nurse visit         Bhupinder Aspen DO

 

                    2017           Office visit         

 

                    2017           Office visit        Bhupinder Aspen DO

 

                    2017           Nurse visit         Bhupinder Aspen DO

 

                    2017           Office visit        Radha Ontiveros APRN

 

                    1/15/2017           Office visit        Aj Tapia NP

 

                    2017            Office visit        Devin Masterson MD

 

                    2016          [facility]            Devin Masterson MD

 

                    12/15/2016          Nurse visit         Bhupinder Aspen DO

 

                    2016           Nurse visit         Bret Forte APRN

 

                    2016           Nurse visit         Bhupinder Aspen DO

 

                    2016            Office visit        Bhupinder Aspen DO

 

                    2016           Nurse visit         Bhupinder Aspen DO

 

                    2016           Office visit        Bret Forte APRN

 

                    2016            Office visit        Bhupinder Aspen DO

 

                    3/15/2016           Nurse visit         Bhupinder Aspen DO

 

                    2016            Nurse visit         Bhupinder Aspen DO

 

                    2015          Nurse visit         Bhupinder Aspen DO

 

                    12/3/2015           Office visit        Bhupinder Aspen DO

 

                    2015          Nurse visit         Bhupinder Aspen DO

 

                    2015           Office visit        Bhupinder Aspen DO

 

                    2015           Nurse visit         Bhupinder Aspen DO

 

                    2015            Nurse visit         Bhupinder Aspen DO

 

                    2015            Nurse visit         Bhupinder Aspen DO

 

                    2015           Office visit        Bhupinder Aspen DO

 

                    2015            Nurse visit         Bhupinder Aspen DO

 

                    3/23/2015           Office visit        Bhupinder Aspen DO

 

                    10/16/2014          Office visit        Bhupinder Aspen DO

 

                    2014           Nurse visit         Radha Ontiveros APRN

 

                    7/10/2014           Nurse visit         Bhupinder Aspen DO

 

                    2014           Office visit        Bhupinder Aspen DO

 

                    2014           Nurse visit         Bhupinder Aspen DO

 

                    3/6/2014            Nurse visit         Bhupinder Aspen DO

 

                    2014            [facility]            EARNEST Lopez MD

 

                    2013          Nurse visit         Bhupinder Aspen DO

 

                    2013           Nurse visit         Bhupinder Aspen DO

 

                    2013            Office visit        Bhupinder Aspen DO

 

                    2013            Nurse visit         Bhupinder Aspen DO

 

                    2013            Nurse visit         Bhupinder Aspen DO

 

                    2013            Office visit        Bhupinder Aspen DO

 

                    4/3/2013            Nurse visit         Bhupinder Aspen DO

 

                    2013            Office visit        Bhupinder Aspen DO

 

                    10/16/2012          Nurse visit         Bhupinder Aspen DO

 

                    2012            Nurse visit         Bhupinder Aspen DO

 

                    2012            Voided              Bhupinder Aspen DO

 

                    2012            Nurse visit         Bhupinder Aspen DO

 

                    2012            Nurse visit         Bhupinder Aspen DO

 

                    2012           Nurse visit         Bhupinder Aspen DO

 

                    5/3/2012            Nurse visit         Bhupinder Aspen DO

 

                    2012           Nurse visit         Bhupinder Aspen DO

 

                    2012           Nurse visit         Bhupinder Aspen DO

 

                    2012           Nurse visit         Bhupinder Aspen DO

 

                    2011           Nurse visit         Bhupinder Aspen DO

 

                    10/20/2011          Nurse visit         Bhupinder Aspen DO

 

                    2011           Office visit        Bhupinder Aspen DO

 

                    2011           Nurse visit         Radha GREEN

 

                    2011            Office visit        Bhupinder Aspen DO

 

                    2011           Nurse visit         Bhupinder Aspen DO

 

                    2011            Office visit        Bhupinder Aspen DO

 

                    2011           Office visit        Bhupinder Aspen DO

 

                    5/10/2011           Office visit        Bhupinder Aspen DO

 

                    2011           Office visit        Bhupinder Aspen DO

 

                    4/15/2011           Office visit        Devin Angel DO

 

                    2011           Office visit        Devin Angel DO

 

                    10/15/2010          Office visit        Devin Angel DO

 

                    2010           Office visit        Devin Angel DO

 

                    2010            Office visit        Devin Angel DO

 

                    2010           Office visit        Devin Angel DO

 

                    2010            Office visit        Devin Angel DO

 

                    3/8/2010            Office visit        Devin Masterson MD

 

                    2010            Surgery             Devin Masterson MD

 

                    2010            Office visit        Devin Angel DO

 

                    2010           Surgery             Devin Masterson MD

 

                    2010           Hospital            Devin Masterson MD

 

                    2010           Hospital            Devin Masterson MD

 

                    10/22/2009          Office visit        Devin Angel DO

## 2019-06-26 NOTE — XMS REPORT
Patient Summary (HL7 CCD)

                             Created on: 2016



YARELY ADAME

External Reference #: [id number]

: 1950

Sex: Female



Demographics







                          Address                   [address]

[place]  [address]

 

                          Home Phone                [phone]

 

                          Preferred Language        English

 

                          Marital Status            Unknown

 

                          Yazidism Affiliation     Unknown

 

                          Race                      White

 

                          Ethnic Group              Not  or 





Author







                          Author                    J LUIS REO              Unknown

 

                          Address                   [address]

[place], [place]  [id number]



 

                          Phone                     [phone]







Care Team Providers







                    Care Team Member Name    Role                Phone

 

                    ROACHLINH DO     Attphys             [phone]

 

                    ROACHLINH DO     Prisurg             [phone]



                                            



Vital Signs

          Unknown or Not Available.                                             
                      



Allergies

          





             Allergy          Code          Allergy Type          Reaction          Status    

 

             No Known Allergies          0            No known allergies                       Active    





                                                                              



Procedures

          





                Procedure          Code            Procedure Type          Date    

 

                ABDOMEN 2 VIEW DECUB/UPRIGHT          730315701          SNOMED CT          2016

    

 

                UA ROUTINE C&S IF IND          [id number]          SNOMED CT          2016    

 

                LIPASE          90628227          SNOMED CT          2016    

 

                COMPREHENSIVE METABOLIC PANEL          [id number]          SNOMED CT          2016

    

 

                CBC W/ AUTO DIFF (RFLX MAN DIFF IF IND)          2138660          SNOMED CT          2016

    

 

                ^UA WITH MICRO          [id number]          SNOMED CT          2016    

 

                ^CBC W/ MANUAL DIFF          [id number]          SNOMED CT          2016    



                                                                                
                                                         



History of Immunizations

          Unknown or Not Available.                                             
                                



Problems

          Unknown or Not Available.                                             
                                          



Results

          







                                        COMPREHENSIVE METABOLIC PANEL - Collect Date/Time: 2016 17:30     

 

             Test Name          Code          Test Result          Test Units          Test Ref Range

    

 

             GLUCOSE          2345-7          138          MG/DL          L=70       H=100    

 

             SODIUM          2951-2          144          MEQ/L          L=135      H=148    

 

             POTASSIUM          2823-3          3.8          MEQ/L          L=3.5      H=5.3    

 

             CHLORIDE          2075-0          102          MEQ/L          L=96       H=110    

 

             CO2          2028-9          26           MEQ/L          L=22       H=29    

 

             BUN          3094-0          28           MG/DL          L=8        H=22    

 

             CREATININE          2160-0          1.6          MG/DL          L=0.6      H=1.6    

 

             SGOT/AST          1920-8          27           IU/L          L=10       H=40    

 

             SGPT/ALT          1742-6          26           IU/L          L=8        H=54    

 

             ALK PHOS          6768-6          73           IU/L          L=35       H=115    

 

             TOTAL PROTEIN          2885-2          7.7          G/DL          L=5.5      H=8.5    

 

             ALBUMIN          1751-7          4.2          G/DL          L=3.1      H=5.4    

 

             TOTAL BILI          1975-2          0.6          MG/DL          L=0.0      H=1.5    

 

             CALCIUM          15774-2          10.5          MG/DL          L=8.2      H=10.6    

 

             AGE                       65           yrs               

 

             GFR NonAA                       32                             

 

             GFR AA                       39                             

 

             eGFR                       32           mL/min/1.7               

 

             eGFR AA*                       39           mL/min/1.7               











                                        LIPASE - Collect Date/Time: 2016 17:30     

 

             Test Name          Code          Test Result          Test Units          Test Ref Range

    

 

             LIPASE          3040-3          17           U/L          L=8        H=78    











                                        CBC W/ AUTO DIFF (RFLX MAN DIFF IF IND) - Collect Date/Time: 2016 17:30   

  

 

             Test Name          Code          Test Result          Test Units          Test Ref Range

    

 

             WBC          57685-2          6.7          TH/CMM          L=4.5      H=10.8    

 

             RBC          789-8          3.64          ML/CMM          L=4.20     H=5.40    

 

             HGB          718-7          11.4          G/DL          L=12.0     H=16.0    

 

             HCT          4544-3          36.5          %            L=37.0     H=47.0    

 

             MCV                       100          FL           L=81       H=99    

 

             MCH                       31.3          PG           L=27.0     H=33.0    

 

             MCHC                       31.2          G/DL          L=31.0     H=36.0    

 

             RDW SD                       46           FL           L=36       H=50    

 

             RDW CV                       12.6          %            L=0.0      H=14.8    

 

             MPV                       8.9          FL           L=9.3      H=12.5    

 

             PLT          777-3          230          TH/CMM          L=130      H=440    

 

             NRBC#                       0.00          TH/CMM          L=0.00     H=0.00    

 

             NRBC%                       0.0          /100WBC          L=0.0      H=2.0    

 

             %NEUT                       83.8          %                 

 

             %LYMP                       7.1          %                 

 

             %MONO                       8.3          %                 

 

             %EOS                       0.6          %                 

 

             %BASO                       0.1          %                 

 

             #NEUT                       5.62          TH/CMM          L=2.10     H=8.20    

 

             #LYMP                       0.48          TH/CMM          L=0.90     H=5.20    

 

             #MONO                       0.56          TH/CMM          L=0.16     H=1.00    

 

             #EOS                       0.04          TH/CMM          L=0.00     H=0.80    

 

             #BASO                       0.01          TH/CMM          L=0.00     H=0.20    

 

             SEGS                       53           %                 

 

             BANDS                       22           %                 

 

             LYMPHS                       9            %                 

 

             MONOS                       15           %                 

 

             EOS                       1            %                 

 

             MANUAL DIFF                       SEE BELOW          N/A               











                                        UA ROUTINE C&S IF IND - Collect Date/Time: 2016 18:27     

 

             Test Name          Code          Test Result          Test Units          Test Ref Range

    

 

             COLOR                       YELLOW          N/A          NL: YELLOW    

 

             APPEARANCE                       CLEAR          N/A          NL: CLEAR    

 

             SPEC GRAV                       1.015          N/A          NL: 1.002 - 1.022    

 

             pH                        5.5          N/A          NL: 5 - 9    

 

             PROTEIN                       NEGATIVE          N/A          NL: NEGATIVE  mg/dl    

 

             GLUCOSE                       NEGATIVE          N/A          NL: NEGATIVE  mg/dl    

 

             KETONE                       NEGATIVE          N/A          NL: NEGATIVE  mg/dl    

 

             BILIRUBIN                       NEGATIVE          N/A          NL: NEGATIVE    

 

             BLOOD                       TRACE-LYSED          N/A          NL: NEGATIVE    

 

             NITRITE                       NEGATIVE          N/A          NL: NEGATIVE    

 

             LEUK SCREEN                       NEGATIVE          N/A          NL: NEGATIVE    

 

             MICRO INDICATED?                       SEE BELOW          N/A               

 

             WBC/HPF                       RARE          N/A          NL:  NEGATIVE    

 

             RBC/HPF                       RARE          N/A          NL:  NEGATIVE    

 

             CASTS/LPF                       NEGATIVE          N/A          NL:  NEGATIVE    

 

             CRYSTALS                       NEGATIVE          N/A          NL:  NEGATIVE    

 

             MUCOUS THRDS                       FEW          N/A          NL:  NEGATIVE    

 

             BACTERIA                       NEGATIVE          N/A          NL:  NEGATIVE    

 

             EPITH CELLS                       FEW SQUAMOUS          N/A          NL:  NEGATIVE    

 

             TRICHOMONAS                       NEGATIVE          N/A          NL:  NEGATIVE    

 

             YEAST                       NEGATIVE          N/A          NL:  NEGATIVE    

 

             CULT SET UP?                       NO           N/A               



                                                                                
                           



Active Medications

          





           Medication          Code          Dose          Units          Frequency          Route   

                          Modification              Start Date/Time    

 

                Aspirin 325MG Oral Tablet, Enteric Coated          887098          325             MILLIGRAMS

                TWO TIMES A DAY          BY MOUTH                          2014 13:00    

 

                          Prescription Detail           325 MILLIGRAMS BY MOUTH TWO TIMES A DAY     

 

                Lithium Carbonate 300MG Oral Capsule          357535          300             MILLIGRAMS 

                TWO TIMES A DAY          ORAL                            2014 13:00    

 

                          Prescription Detail           300 MILLIGRAMS ORAL TWO TIMES A DAY      

 

             Norco 325MG-5MG Oral Tablet          755586          1            TABLET          AS NEEDED

 EVERY 4 HR          BY MOUTH            FOR PAIN            2014 13:00    

 

                          Prescription Detail           TAKE 1 TABLET BY MOUTH AS NEEDED EVERY 4 HR FOR PAIN

     

 

             Pravastatin 40MG Oral Tablet          310971          40           MILLIGRAMS          AT

 BEDTIME            ORAL                                    2014 13:00    

 

                          Prescription Detail           40 MILLIGRAMS ORAL AT BEDTIME      

 

                Vitamin B12 100MCG/1ML Intramuscular Solution          424273          1               EACH

                MONTHLY          INTRAMUSCULAR                          2014 13:00    

 

                          Prescription Detail           1 EACH INTRAMUSCULAR MONTHLY      

 

                Vitamin D 1000IU Oral Tablet          093344          2000            INTERNATIONAL UNITS

                DAILY           ORAL                            2014 13:00    

 

                          Prescription Detail           2000 INTERNATIONAL UNITS ORAL DAILY      

 

             Zoloft 100MG Oral Tablet          346393          200          MILLIGRAMS          DAILY

                    ORAL                                    2014 13:00    

 

                          Prescription Detail           200 MILLIGRAMS ORAL DAILY      



                                                                                
                                               



Medications Administered During Visit

          Unknown or Not Available.                                             
                                



Encounters

          





                    Encounter Diagnosis          Diagnosis Code          Start Date    

 

                    Nausea              R110                2016    



                                                                    



Social History

          





                Smoking Status          Code            Start Date          End Date    

 

                Never smoker          [id number]                               



                                                                    



Patient Decision Aids

          Unknown or Not Available.                                             
            



Discharge Instructions

          







                                                



You were admitted to        [facility] on 2016 16:29 with a principal 
diagnosis of Nausea        



You had the following tests done:                  CBC W/ AUTO DIFF (RFLX MAN DI
FF IF IND)          COMPREHENSIVE METABOLIC PANEL          LIPASE          UA 
ROUTINE C&S IF IND                



You were discharged from        [facility] on 2016 19:04        



Should you have any questions prior to discharge, please contact a member of 
your healthcare team. If you have left the hospital and have any questions, 
please contact your primary care physician.          



                                                          



Chief Complaint and Reason For Visit

          





                          Chief Complaint           Date of Onset    

 

                          GENERAL WEAKNESS NAUSEA               



                                                          



Function Status

          Unknown or Not Available.                                             
            



Plan of Care

          Unknown or Not Available.                                             
            



Referral/Transition of Care

          Unknown or Not Available.

## 2019-06-26 NOTE — XMS REPORT
MU2 Ambulatory Summary

                             Created on: 2017



Pauline Gan

External Reference #: [id number]

: 1950

Sex: Female



Demographics







                          Address                   [address]

[place]  [id number]

 

                          Home Phone                [phone]

 

                          Preferred Language        English

 

                          Marital Status            Legally 

 

                          Hinduism Affiliation     Unknown

 

                          Race                      White

 

                          Ethnic Group              Not  or 





Author







                          Bhupinder Aguilar

 

                          [facility]

 

                          Address                   [address]

[place]  [id number]



 

                          Phone                     [phone]







Care Team Providers







                    Care Team Member Name    Role                Phone

 

                    Bhupinder Louise    PCP                 Unavailable

 

                    Bhupinder Louise    PreferredProvider    Unavailable







Allergies and Adverse Reactions







                    Name                Reaction            Notes

 

                    NO KNOWN DRUG ALLERGIES                         







Plan of Treatment







             Planned Activity    Comments     Planned Date    Planned Time    Plan/Goal

 

             Injection, Subcutaneous/IM                 2016    12:00 AM      

 

             Injection, Subcutaneous/IM                 2016    12:00 AM      

 

             Mammography; bilateral                 2016    12:00 AM      

 

             Injection, Subcutaneous/IM                 2016    12:00 AM      

 

             Injection,Subcutaneous/Intramuscul, C Medicare                 2017    12:00 AM      

 

             CBC with Auto                 2013     12:00 AM      

 

             Lithium                   2013     12:00 AM      

 

             Basic metabolic panel                 2013     12:00 AM      

 

             Vitamin B12 (cyanocobalamin)                 2013     12:00 AM      

 

             Folic acid, serum                 2013     12:00 AM      

 

             Injection,Subcutaneous/Intramuscul                 2013    12:00 AM      







Medications







                                        Active 

 

             Name         Start Date    Estimated Completion Date    SIG          Comments

 

                Latuda 20 mg oral tablet                                    take 1 tablet (20 mg) by oral route once daily with

 food (at least 350 calories)            

 

             pravastatin 40 mg oral tablet    3/30/2015                 TAKE 1 TABLET BY MOUTH DAILY     

 

                Namenda XR 28 mg oral capsule,sprinkle,ER 24hr    2015                       take 1 capsule (28

 mg) by oral route once daily            

 

                Namenda XR 28 mg oral capsule,sprinkle,ER 24hr    2016                       take 1 capsule (28

 mg) by oral route once daily            

 

                triamcinolone acetonide 0.1 % topical cream    2016                        apply a thin layer to 

the affected area(s) by topical route 2 times per day     

 

                potassium chloride 10 mEq oral tablet extended release    2016                       take 1 tablet

 (10 meq) by oral route once daily       

 

             pravastatin 40 mg oral tablet    2016                 TAKE 1 TABLET BY MOUTH DAILY     

 

                Vitamin B-12 1,000 mcg/mL injection solution    2016                       inject 1 milliliter 

(1,000 mcg) by intramuscular route once a month     

 

                potassium chloride 10 mEq oral tablet extended release    2016                      take 1 tablet

 (10 meq) by oral route once daily       

 

                Namenda XR 28 mg oral capsule,sprinkle,ER 24hr    2016                      TAKE 1 CAPSULE BY

 MOUTH EVERY DAY                         

 

                furosemide 40 mg oral tablet    2016                      take 1 tablet (40 mg) by oral route

 once daily                              

 

                metoclopramide HCl 10 mg oral tablet    2017       take 1 tablet by oral

 route 2 times a day for 50 days         









                                         

 

             Name         Start Date    Expiration Date    SIG          Comments

 

             Reglan 10mg    3/29/2010    2010    one ac and hs     

 

                Keflex 500 mg oral capsule    2010       10/1/2010       take 1 capsule (500 mg) by oral

 route every 6 hours for 10 days         

 

                Bactrim -160 mg oral tablet    2011       take 1 tablet by oral route

 every 12 hours for 7 days               

 

                triamcinolone acetonide 0.1 % topical cream    2011      apply a thin

 layer to the affected area(s) by topical route 2 times per day     

 

                sertraline 100 mg oral tablet    4/10/2012       5/10/2012       take 1.5 tablets by oral route

 daily for 30 days                       

 

                ergocalciferol (vitamin D2) 50,000 unit oral capsule    4/15/2013       2013       TAKE

 ONE CAPSULE BY MOUTH ONCE A WEEK        

 

                CYANOCOBALAM 1000MCGINJ 1000 milliliter    2013       INJECT 1ML INTRAMUSCULAR

 ONCE A MONTH                            

 

                pravastatin 40 mg oral tablet    3/25/2014       3/20/2015       TAKE ONE TABLET BY MOUTH EVERY

 DAY                                     

 

                          Zostavax (PF) 19,400 unit/0.65 mL subcutaneous suspension for reconstitution    3/23/2015

                    3/24/2015           inject 0.65 milliliter by subcutaneous route once     

 

                famciclovir 500 mg oral tablet    12/3/2015       12/10/2015      take 1 tablet (500 mg) by

 oral route every 8 hours for 7 days     

 

                furosemide 40 mg oral tablet    2016      take 1 tablet (40 mg) by oral

 route once daily                        

 

                Cipro 500 mg oral tablet    2016       take 1 tablet (500 mg) by oral route

 2 times per day for 5 days              

 

                Bactrim -160 mg oral tablet    2016        take 1 tablet by oral route

 every 12 hours for 7 days               

 

                Macrobid 100 mg oral capsule    2017       take 1 capsule (100 mg) by oral

 route 2 times per day with food for 7 days     

 

                Augmentin 875-125 mg oral tablet    2017       take 1 tablet by oral route

 every 12 hours for 7 days               









                                        Discontinued 

 

             Name         Start Date    Discontinued Date    SIG          Comments

 

                Tylenol 325 mg oral tablet                    2013        take 1 - 2 tablets (325 -650 mg) by oral

 route every 4-6 hours as needed         

 

                Calcium 600 + D(3) 600 mg(1,500mg) -400 unit oral tablet                    2011       take 1 tablet

 by oral route 2 times a day            no longer taking

 

                Vitamin B-12 1,000 mcg oral tablet extended release    2010       take 1

 tablet by oral route daily             no longer taking

 

                Antifungal (clotrimazole) 1 % topical cream    2010       apply to the 

affected and surrounding areas of skin by topical route 2 times per day morning 
and evening                              

 

                sertraline 100 mg oral tablet    5/10/2011       2011       take 2 tablets (200 mg) by 

oral route once daily                   discontinued by Dr. Serrano

 

                mirtazapine 15 mg oral tablet                    2011        take 1 tablet (15 mg) by oral route 

once daily before bedtime               Dr. Serrano

 

                mirtazapine 15 mg oral tablet                    2011        take 1 tablet (15 mg) by oral route 

once daily before bedtime               dc'd by Dr. Serrano

 

                Pristiq 50 mg oral tablet extended release 24 hr                    2013        take 1 tablet (50

 mg) by oral route once daily           Dr. Zach Sarahstiq 50 mg oral tablet extended release 24 hr                    2013        take 1 tablet (50

 mg) by oral route once daily           dose updated

 

                Vitamin B-12 1,000 mcg/mL injection solution    2011        inject 1 milliliter

 (1,000 mcg) by intramuscular route once a month    on list already

 

                    syringe with needle 1 mL 25 gauge x 1" miscellaneous syringe    2011

                          use for injection once a month     

 

                clotrimazole 1 % topical cream    2011        apply to the affected and surrounding

 areas of skin by topical route 2 times per day in the morning and evening     

 

                Vitamin D2 50,000 unit oral capsule    2011        take 1 capsule (50,000

 unit) by oral route once weekly        generic on list

 

                Pravachol 40 mg oral tablet    2012        take 1 tablet (40 mg) by oral 

route once daily for 90 days            generic on list

 

                lithium carbonate 300 mg oral capsule    2012        take 1 capsule by oral

 route daily                            dose updated

 

                Pristiq 100 mg oral tablet extended release 24 hr                    4/10/2012       take 1 and 1/2 

tablet (150 mg) by oral route once daily    Mental Health provider

 

                Pristiq 100 mg oral tablet extended release 24 hr                    4/10/2012       take 1 and 1/2 

tablet (150 mg) by oral route once daily    Discontinued by [facility]frain [facility]

 

                hydroxyzine HCl 50 mg oral tablet    10/16/2014      2015       take 1 tablet (50 mg) 

by oral route at bedtime                 

 

                lithium carbonate 300 mg oral capsule    2015       take 1 capsule (300

 mg) by oral route 2 for 30 days         

 

                fluconazole 100 mg oral tablet    2015       12/3/2015       take 1 tablet (100 mg) by 

oral route once a week                   

 

                ketoconazole 2 % topical cream    2015       12/3/2015       apply to the affected area(s)

 by topical route 2 times per day        

 

                prednisone 10 mg oral tablet    12/3/2015       2016        take 2 tablets (20 mg) by oral

 route once daily for 4 days 1 tablet daily for 4 days 0.5 tablet daily for 4 
days                                     

 

                Cipro 500 mg oral tablet    1/15/2017       2017       take 1 tablet (500 mg) by oral route

 every 12 hours for 10 days              







Problem List







                    Description         Status              Onset

 

                    Artificial opening status; colostomy    Active               

 

                    Bipolar disorder, unspecified    Active               

 

                    Hyperlipidemia      Active               

 

                    Peritoneal Neoplasm, Malignant    Active               

 

                    Anemia, Pernicious    Active               

 

                    Arthritis unspecified    Active               

 

                    B12 deficiency      Active               







Vital Signs







      Date    Time    BP-Sys(mm[Hg]    BP-Lynn(mm[Hg])    HR(bpm)    RR(rpm)    Temp    WT    HT    HC    BMI

                    BSA                 BMI Percentile      O2 Sat(%)

 

        2017    11:07:00 AM    124 mmHg    64 mmHg    62 bpm    17 rpm    98.2 F    181.2 lbs    69

 in                       26.76 kg/m2    2.00 m2                   98 %

 

        1/15/2017    3:34:00 PM    148 mmHg    89 mmHg    69 bpm    20 rpm    98.2 F    179 lbs    69 in

                          26.4334 kg/m    1.9882 m                 98 %

 

       2017    1:51:00 PM    160 mmHg    90 mmHg    100 bpm    20 rpm    96.5 F    179 lbs             

                                                                98 %

 

       2016    3:11:00 PM    134 mmHg    76 mmHg    80 bpm    20 rpm    98 F    163 lbs    69 in     

                24.0706 kg/m    1.8972 m                      98 %

 

        2016    2:04:00 PM    142 mmHg    86 mmHg    68 bpm    16 rpm    98.5 F    166 lbs    63 in

                          29.41 kg/m2    1.83 m2                   100 %

 

        2016    11:27:00 AM    148 mmHg    78 mmHg    90 bpm    20 rpm    98.2 F    153 lbs    69 in

                          22.5939 kg/m    1.8381 m                 96 %

 

        12/3/2015    9:50:00 AM    132 mmHg    70 mmHg    62 bpm    16 rpm    97.9 F    145 lbs    69 in

                          21.41 kg/m2    1.79 m2                   100 %

 

        2015    8:52:00 AM    132 mmHg    68 mmHg    52 bpm    20 rpm    97.8 F    141 lbs    69 in

                          20.8218 kg/m    1.7645 m                 100 %

 

        2015    3:25:00 PM    120 mmHg    62 mmHg    72 bpm    16 rpm    98.1 F    136 lbs    69 in

                          20.08 kg/m2    1.73 m2                   98 %

 

       3/23/2015    2:55:00 PM    130 mmHg    76 mmHg    68 bpm    18 rpm    97 F    140 lbs    69 in    

                20.6742 kg/m    1.7583 m                      98 %

 

        10/16/2014    11:11:00 AM    120 mmHg    66 mmHg    77 bpm    20 rpm    98 F    130 lbs    69 in

                          19.20 kg/m2    1.69 m2                   100 %

 

        2014    3:21:00 PM    130 mmHg    66 mmHg    63 bpm    18 rpm    97.2 F    160 lbs    69 in

                          23.6276 kg/m    1.8797 m                 99 %

 

        2013    10:35:00 AM    132 mmHg    70 mmHg    66 bpm    20 rpm    98.1 F    157 lbs    69 in

                          23.18 kg/m2    1.86 m2                    

 

        2013    1:29:00 PM    132 mmHg    70 mmHg    76 bpm    18 rpm    98.2 F    166 lbs    69 in 

                          24.5137 kg/m    1.9146 m                  

 

       2013    2:46:00 PM    128 mmHg    70 mmHg    76 bpm    16 rpm    98 F    160 lbs    69 in     

                23.63 kg/m2     1.88 m2                          

 

        2011    8:49:00 AM    128 mmHg    78 mmHg    70 bpm    18 rpm    97.9 F    164 lbs    69 in

                          24.2183 kg/m    1.903 m                  

 

     2011    1:31:00 PM    132 mmHg    68 mmHg    84 bpm         97 F    167 lbs                        

                                         

 

        2011    9:09:00 AM    128 mmHg    70 mmHg    72 bpm    18 rpm    98.2 F    163 lbs    64 in 

                          27.9786 kg/m    1.8272 m                  

 

       2011    10:01:00 AM    132 mmHg    70 mmHg    72 bpm    18 rpm    98.2 F    154 lbs             

                                                                 

 

       2011    2:47:00 PM    128 mmHg    70 mmHg    72 bpm    18 rpm    97.8 F    156 lbs             

                                                                 

 

       5/10/2011    3:16:00 PM    144 mmHg    80 mmHg    72 bpm    18 rpm    98.2 F    158 lbs             

                                                                 

 

        2011    10:11:00 AM    132 mmHg    70 mmHg    70 bpm    18 rpm    98.2 F    168 lbs    69 in

                          24.809 kg/m    1.9261 m                  

 

        4/15/2011    10:52:00 AM    110 mmHg    60 mmHg    75 bpm    16 rpm    97.5 F    172.375 lbs    

69 in                     25.46 kg/m2    1.95 m2                   100 %

 

        2011    11:43:00 AM    120 mmHg    82 mmHg    75 bpm    16 rpm    97.2 F    178.5 lbs    69

 in                       26.3596 kg/m    1.9854 m                 100 %

 

        10/15/2010    1:32:00 PM    120 mmHg    70 mmHg    80 bpm    18 rpm    96.6 F    177 lbs    69 in

                          26.14 kg/m2    1.98 m2                   100 %

 

        2010    3:50:00 PM    168 mmHg    100 mmHg    82 bpm    18 rpm    97.8 F    177.5 lbs    69

 in                       26.2119 kg/m    1.9798 m                 97 %

 

        2010    1:21:00 PM    140 mmHg    80 mmHg    59 bpm    16 rpm    97.6 F    173.25 lbs    69 

in                        25.58 kg/m2    1.96 m2                   100 %

 

        2010    3:02:00 PM    140 mmHg    80 mmHg    61 bpm    16 rpm    97.6 F    173.125 lbs    69

 in                       25.5658 kg/m    1.9553 m                 99 %

 

        2010    1:23:00 PM    130 mmHg    80 mmHg    66 bpm    16 rpm    96.8 F    173 lbs    69 in 

                          25.55 kg/m2    1.95 m2                   100 %

 

        2010    12:58:00 PM    130 mmHg    88 mmHg    75 bpm    16 rpm    98.4 F    172.25 lbs    69

 in                       25.4366 kg/m    1.9503 m                 100 %







Social History







                    Name                Description         Comments

 

                    denies alcohol use                         

 

                    denies smoking                           

 

                    Denies illicit substance abuse                         

 

                    retired                                 direct care

 

                    Single                                   

 

                    Exercises regularly                         

 

                    Attended some college                         

 

                    Tobacco             Never smoker         







History of Procedures







                    Date Ordered        Description         Order Status

 

                    2010 12:00 AM    COMPREHEN METABOLIC PANEL    Reviewed

 

                    2010 12:00 AM    COMPLETE CBC W/AUTO DIFF WBC    Reviewed

 

                    2010 12:00 AM    LIPID PANEL         Reviewed

 

                          2015 12:00 AM        B12 Injection, Up to 1000 Mcg NDC#3174-8426-32 [facility] 

                                        Reviewed

 

                    2011 12:00 AM    MAMMOGRAM SCREENING    Reviewed

 

                    2011 12:00 AM    CYTOPATH C/V THIN LAYER    Reviewed

 

                    2011 12:00 AM    B12 Injection 1 cc NDC#[address]-66    Reviewed

 

                    2015 12:00 AM    THER/PROPH/DIAG INJ SC/IM    Reviewed

 

                    2015 12:00 AM    B12 Injection, Up to 1000 Mcg NDC#2836-4254-63    Reviewed

 

                    2011 12:00 AM    THER/PROPH/DIAG INJ SC/IM    Reviewed

 

                    2011 12:00 AM    B12 Injection([place]) Ndc#9410-3360-28-    Reviewed

 

                    2015 12:00 AM    THER/PROPH/DIAG INJ SC/IM    Reviewed

 

                    2015 12:00 AM    B12 Injection, Up to 1000 Mcg NDC#1908-7854-04    Reviewed

 

                    10/20/2011 12:00 AM    THER/PROPH/DIAG INJ SC/IM    Reviewed

 

                    10/20/2011 12:00 AM    B12 Injection([place]) Ndc#1433-3807-56-    Reviewed

 

                    2016 12:00 AM    THER/PROPH/DIAG INJ SC/IM    Reviewed

 

                    2016 12:00 AM    B12 Injection, Up to 1000 Mcg NDC#8382-6527-85    Reviewed

 

                    3/14/2016 12:00 AM    VITAMIN B-12        Reviewed

 

                    3/15/2016 12:00 AM    THER/PROPH/DIAG INJ SC/IM    Reviewed

 

                    3/15/2016 12:00 AM    B12 Injection, Up to 1000 Mcg NDC#9644-6761-31    Reviewed

 

                    2011 12:00 AM    ***Immunization administration, Medicare flu    Reviewed

 

                    2011 12:00 AM    Fluzone ** MEDICARE Only **    Reviewed

 

                    2011 12:00 AM    THER/PROPH/DIAG INJ SC/IM    Reviewed

 

                    2011 12:00 AM    B12 Injection ([employer]) Ndc#2417-3238-68    Reviewed

 

                    2016 12:00 AM    B12 Injection, Up to 1000 Mcg NDC#1384-1651-12 [facility]    

Reviewed

 

                    2016 12:00 AM    TTE W/DOPPLER COMPLETE    Reviewed

 

                    2016 12:00 AM    EXTREMITY STUDY     Returned

 

                          2016 12:00 AM        B12 Injection, Up to 1000 Mcg NDC#8986-7576-13 [facility] 

                                        Reviewed

 

                    2016 12:00 AM    THER/PROPH/DIAG INJ SC/IM    Reviewed

 

                    2012 12:00 AM    THER/PROPH/DIAG INJ SC/IM    Reviewed

 

                    2012 12:00 AM    B12 Injection ([employer]) Ndc#5657-1305-33    Reviewed

 

                    2016 12:00 AM    THER/PROPH/DIAG INJ SC/IM    Reviewed

 

                    2016 12:00 AM    B12 Injection, Up to 1000 Mcg NDC#5401-0559-01    Reviewed

 

                    2012 12:00 AM    THER/PROPH/DIAG INJ SC/IM    Reviewed

 

                    2012 12:00 AM    B12 Injection([place]) Ndc#3910-8613-70-    Reviewed

 

                    12/15/2016 12:00 AM    B12 Injection, Up to 1000 Mcg NDC#7905-8928-15    Reviewed

 

                    12/15/2016 12:00 AM    THER/PROPH/DIAG INJ SC/IM    Reviewed

 

                    2016 12:00 AM    URNLS DIP STICK/TABLET RGNT AUTO W/O MICROSCOPY    Returned

 

                    1/3/2017 12:00 AM    URNLS DIP STICK/TABLET RGNT AUTO W/O MICROSCOPY    Returned

 

                    2017 12:00 AM    URINE CULTURE/COLONY COUNT    Returned

 

                    2017 12:00 AM    Rocephin 1 gram Injection, [facility]    Reviewed

 

                    2017 12:00 AM    THERAPEUTIC PROPHYLACTIC/DX INJECTION SUBQ/IM    Reviewed

 

                    2017 12:00 AM    B12 1000mcg Injection, [facility]    Reviewed

 

                    5/3/2012 12:00 AM    THER/PROPH/DIAG INJ SC/IM    Reviewed

 

                    5/3/2012 12:00 AM    B12 Injection([place]) Ndc#5346-2586-89-    Reviewed

 

                    2012 12:00 AM    IMMUNOTHERAPY INJECTIONS    Reviewed

 

                    2012 12:00 AM    B12 Injection([place]) Ndc#2550-2472-43-    Reviewed

 

                    2012 12:00 AM    THER/PROPH/DIAG INJ SC/IM    Reviewed

 

                    2012 12:00 AM    B12 Injection, Up to 1000 Mcg NDC#3812-3035-45    Reviewed

 

                    2012 12:00 AM    THER/PROPH/DIAG INJ SC/IM    Reviewed

 

                    2012 12:00 AM    B12 Injection, Up to 1000 Mcg NDC#4103-5935-78    Reviewed

 

                    2012 12:00 AM    THER/PROPH/DIAG INJ SC/IM    Reviewed

 

                    2012 12:00 AM    B12 Injection, Up to 1000 Mcg NDC#6425-9548-41    Reviewed

 

                    10/16/2012 12:00 AM    THER/PROPH/DIAG INJ SC/IM    Reviewed

 

                    10/16/2012 12:00 AM    B12 Injection, Up to 1000 Mcg NDC#3254-8389-93    Reviewed

 

                    2010 12:00 AM    COMPREHEN METABOLIC PANEL    Reviewed

 

                    2010 12:00 AM    COMPLETE CBC W/AUTO DIFF WBC    Reviewed

 

                    2010 12:00 AM    LIPID PANEL         Reviewed

 

                    2013 12:00 AM    Flu Injection 3 Years And Above NDC# [address]-27  [facility]    Reviewed



 

                    2013 12:00 AM    COMPLETE CBC W/AUTO DIFF WBC    Reviewed

 

                    2013 12:00 AM    ASSAY OF LITHIUM    Reviewed

 

                    2013 12:00 AM    METABOLIC PANEL TOTAL CA    Reviewed

 

                    4/3/2013 12:00 AM    THER/PROPH/DIAG INJ SC/IM    Reviewed

 

                    4/3/2013 12:00 AM    B12 Injection, Up to 1000 Mcg NDC#3947-8460-76    Reviewed

 

                    2013 12:00 AM    THER/PROPH/DIAG INJ SC/IM    Reviewed

 

                    2013 12:00 AM    B12 Injection, Up to 1000 Mcg NDC#7499-9919-49    Reviewed

 

                    2013 12:00 AM    THER/PROPH/DIAG INJ SC/IM    Reviewed

 

                    2013 12:00 AM    B12 Injection, Up to 1000 Mcg NDC#3801-3322-24    Reviewed

 

                    2013 12:00 AM    LIPID PANEL         Reviewed

 

                    2013 12:00 AM    VITAMIN D 25 HYDROXY    Reviewed

 

                    2013 12:00 AM    THER/PROPH/DIAG INJ SC/IM    Reviewed

 

                    3/6/2014 12:00 AM    THER/PROPH/DIAG INJ SC/IM    Reviewed

 

                    2014 12:00 AM    THER/PROPH/DIAG INJ SC/IM    Reviewed

 

                    2014 12:00 AM    B12 Injection, Up to 1000 Mcg NDC#8593-5526-93    Reviewed

 

                    2010 12:00 AM    SKIN FUNGI CULTURE    Reviewed

 

                    10/9/2010 12:00 AM    COMPREHEN METABOLIC PANEL    Reviewed

 

                    10/9/2010 12:00 AM    LIPID PANEL         Reviewed

 

                    2010 12:00 AM    THER/PROPH/DIAG INJ SC/IM    Reviewed

 

                    2010 12:00 AM    B12 Injection Ndc#[address]-26 ([place])    Reviewed

 

                    2010 12:00 AM    THER/PROPH/DIAG INJ SC/IM    Reviewed

 

                    2010 12:00 AM    Kenalog 40 Mg Im-Ndc#[address]-55 ([place])    Reviewed

 

                    10/15/2010 12:00 AM    FLU VACCINE 3 YRS & > IM    Reviewed

 

                    10/15/2010 12:00 AM    Admin.Of M/C Cov.Vaccine-Flu Vacc.    Reviewed

 

                    1/15/2011 12:00 AM    COMPLETE CBC W/AUTO DIFF WBC    Reviewed

 

                    1/15/2011 12:00 AM    COMPREHEN METABOLIC PANEL    Reviewed

 

                    1/15/2011 12:00 AM    LIPID PANEL         Reviewed

 

                    2014 12:00 AM    MAMMOGRAM SCREENING    Reviewed

 

                    2014 12:00 AM    Screening mammography, bilateral    Reviewed

 

                    7/10/2014 12:00 AM    THER/PROPH/DIAG INJ SC/IM    Reviewed

 

                    7/10/2014 12:00 AM    B12 Injection, Up to 1000 Mcg NDC#3208-5795-29    Reviewed

 

                    2011 12:00 AM    COMPLETE CBC W/AUTO DIFF WBC    Reviewed

 

                    2011 12:00 AM    COMPREHEN METABOLIC PANEL    Reviewed

 

                    2011 12:00 AM    LIPID PANEL         Reviewed

 

                    2014 12:00 AM    B12 Injection, Up to 1000 Mcg NDC#6366-9828-64    Reviewed

 

                    10/19/2014 12:00 AM    MAMMOGRAM SCREENING    Reviewed

 

                    10/19/2014 12:00 AM    Screening mammography, bilateral    Reviewed

 

                    10/16/2014 12:00 AM    COMPLETE CBC W/AUTO DIFF WBC    Reviewed

 

                    10/16/2014 12:00 AM    COMPREHEN METABOLIC PANEL    Reviewed

 

                    10/16/2014 12:00 AM    IMMUNOASSAY TUMOR     Reviewed

 

                    10/16/2014 12:00 AM    LIPID PANEL         Reviewed

 

                    10/16/2014 12:00 AM    ASSAY OF LITHIUM    Reviewed

 

                    10/16/2014 12:00 AM    MAMMOGRAM SCREENING    Reviewed

 

                    2011 12:00 AM    ASSAY OF PARATHORMONE    Reviewed

 

                    2011 12:00 AM    VITAMIN D 25 HYDROXY    Reviewed

 

                    2011 12:00 AM    ASSAY OF LITHIUM    Reviewed

 

                    2011 12:00 AM    METABOLIC PANEL TOTAL CA    Reviewed

 

                    2011 12:00 AM    CT HEAD/BRAIN W/O & W/DYE    Reviewed

 

                    3/23/2015 12:00 AM    PNEUMOCOCCAL VACC 13 GLENDY IM    Reviewed

 

                    3/23/2015 12:00 AM    Vitamin B12 injection    Reviewed

 

                    2011 12:00 AM    ASSAY OF LITHIUM    Reviewed

 

                    2011 12:00 AM    B12 Injection Ndc#[address]-00  [facility]    Reviewed

 

                    2015 12:00 AM    THER/PROPH/DIAG INJ SC/IM    Reviewed

 

                    2015 12:00 AM    B12 Injection, Up to 1000 Mcg NDC#7884-9336-16    Reviewed

 

                    2015 12:00 AM    COMPLETE CBC W/AUTO DIFF WBC    Reviewed

 

                    2015 12:00 AM    COMPREHEN METABOLIC PANEL    Reviewed

 

                    2015 12:00 AM    LIPID PANEL         Reviewed

 

                    2015 12:00 AM    ASSAY OF LITHIUM    Reviewed

 

                    2011 12:00 AM    VIT D 1 25-DIHYDROXY    Reviewed

 

                    2011 12:00 AM    VITAMIN B-12        Reviewed

 

                    2015 12:00 AM    B12 Injection, Up to 1000 Mcg NDC#9877-5370-41    Reviewed

 

                    2015 12:00 AM    THER/PROPH/DIAG INJ SC/IM    Reviewed

 

                    2015 12:00 AM    B12 Injection, Up to 1000 Mcg NDC#2092-8774-17    Reviewed

 

                    2011 12:00 AM    THER/PROPH/DIAG INJ SC/IM    Reviewed

 

                    2011 12:00 AM    B12 Injection ([employer]) Ndc#8208-8428-58    Reviewed

 

                    2015 12:00 AM    THER/PROPH/DIAG INJ SC/IM    Reviewed

 

                    2015 12:00 AM    B12 Injection, Up to 1000 Mcg NDC#9713-5048-96    Reviewed







Results Summary







                          Data and Description      Results

 

                          2004 12:00 AM        Colonoscopy-Women and Men over 50 Normal 

 

                          2008 12:00 AM         Pap Smear Declined 

 

                          10/7/2009 12:00 AM        Cholest Cry Stone Ql .0 %LDLc SerPl-mCnc 123.0 mg/dLHDLc

 SerPl-mCnc 34.0 mg/dLTrigl SerPl-mCnc 190.0 mg/dLGlucose SerPl-mCnc 78.0 mg/dL

 

                          2009 12:00 AM        Mammogram -Women over 40 Normal HIV1+2 Ab Ser Ql no risk 

 

                          2010 8:47 AM         Dexa Bone Scan Refused Aspirin reccommended Contraindication 



 

                          2010 8:48 AM         Depression Done 

 

                          2010 12:00 AM         Foot Exam-Diabetic Done 

 

                          2010 12:00 AM         Cholest Cry Stone Ql .0 %LDLc SerPl-mCnc 126.0 mg/dLGlucose

 SerPl-mCnc 102.0 mg/dL

 

                          2010 8:45 AM          TRIGLYCERIDES 122.0 mg/dLCHOLESTEROL 186.0 mg/dLHDL 36.0 mg/dLTOT

 CHOL/HDL 5.2 LDL (CALC) 126.0 mg/dLGLUCOSE 102.0 mg/dLSODIUM 143.0 
mmol/LPOTASSIUM 3.70 mmol/LCHLORIDE 111.0 mmol/LCO2 23.0 mmol/LBUN 10.0 
mg/dLCREATININE 0.80 mg/dLSGOT/AST 12.0 IU/LSGPT/ALT 11.0 IU/LALK PHOS 65.0 
IU/LTOTAL PROTEIN 7.20 g/dLALBUMIN 3.90 g/dLTOTAL BILI 0.50 mg/dLCALCIUM 10.20 
mg/dLAGE 59 GFR NonAA 73 GFR AA 88 eGFR >60 mL/min/1.73 m2eGFR AA* >60 WBC 5.7 
RBC 3.26 HGB 10.60 g/dLHCT 31.70 %MCV 97.0 fLMCH 32.50 pgMCHC 33.40 g/dLRDW SD 
47 RDW CV 13.30 %MPV 9.70 fLPLT 287 NRBC# 0.00 NRBC% 0.0 %NEUT 62.90 %%LYMP 
21.80 %%MONO 9.90 %%EOS 5.0 %%BASO 0.40 %#NEUT 3.56 #LYMP 1.23 #MONO 0.56 #EOS 
0.28 #BASO 0.02 MANUAL DIFF NOT IND 

 

                          2010 12:00 AM        Glucose SerPl-mCnc 96.0 mg/dLCholest Cry Stone Ql .0 %LDLc

 SerPl-mCnc 146.0 mg/dL

 

                          2010 8:26 AM         TRIGLYCERIDES 106.0 mg/dLCHOLESTEROL 199.0 mg/dLHDL 32.0 mg/dLTOT

 CHOL/HDL 6.2 LDL (CALC) 146.0 mg/dLGLUCOSE 96.0 mg/dLSODIUM 143.0 
mmol/LPOTASSIUM 4.0 mmol/LCHLORIDE 113.0 mmol/LCO2 24.0 mmol/LBUN 13.0 
mg/dLCREATININE 1.0 mg/dLSGOT/AST 11.0 IU/LSGPT/ALT 6.0 IU/LALK PHOS 56.0 
IU/LTOTAL PROTEIN 6.60 g/dLALBUMIN 3.80 g/dLTOTAL BILI 0.50 mg/dLCALCIUM 9.30 
mg/dLAGE 59 GFR NonAA 57 GFR AA 69 eGFR 57 eGFR AA* >60 

 

                          10/6/2010 12:00 AM        Cholest Cry Stone Ql .0 %LDLc SerPl-mCnc 111.0 mg/dLGlucose

 SerPl-mCnc 81.0 mg/dL

 

                          10/6/2010 2:45 PM         TRIGLYCERIDES 123.0 mg/dLCHOLESTEROL 178.0 mg/dLHDL 42.0 mg/dLTOT

 CHOL/HDL 4.2 LDL (CALC) 111.0 mg/dLGLUCOSE 81.0 mg/dLSODIUM 139.0 
mmol/LPOTASSIUM 4.10 mmol/LCHLORIDE 106.0 mmol/LCO2 24.0 mmol/LBUN 13.0 
mg/dLCREATININE 0.90 mg/dLSGOT/AST 13.0 IU/LSGPT/ALT 11.0 IU/LALK PHOS 61.0 
IU/LTOTAL PROTEIN 7.10 g/dLALBUMIN 3.90 g/dLTOTAL BILI 0.30 mg/dLCALCIUM 9.30 
mg/dLAGE 60 GFR NonAA 64 GFR AA 78 eGFR >60 mL/min/1.73 m2eGFR AA* >60 WBC 6.9 
RBC 3.59 HGB 11.50 g/dLHCT 35.30 %MCV 98.0 fLMCH 32.0 pgMCHC 32.60 g/dLRDW SD 46
 RDW CV 12.90 %MPV 9.90 fLPLT 311 NRBC# 0.00 NRBC% 0.0 %NEUT 64.90 %%LYMP 22.50 
%%MONO 7.20 %%EOS 5.10 %%BASO 0.30 %#NEUT 4.45 #LYMP 1.54 #MONO 0.49 #EOS 0.35 
#BASO 0.02 MANUAL DIFF NOT IND 

 

                          2011 12:00 AM         Mammogram -Women over 40 Ordered 

 

                          2011 10:25 AM        TRIGLYCERIDES 111.0 mg/dLCHOLESTEROL 195.0 mg/dLHDL 43.0 mg/dLTOT

 CHOL/HDL 4.5 LDL (CALC) 130.0 mg/dLWBC 5.3 RBC 3.76 HGB 12.0 g/dLHCT 37.80 %MCV
 101.0 fLMCH 31.90 pgMCHC 31.70 g/dLRDW SD 47 RDW CV 13.0 %MPV 9.70 fLPLT 259 
NRBC# 0.00 NRBC% 0.0 %NEUT 69.0 %%LYMP 17.60 %%MONO 8.30 %%EOS 4.70 %%BASO 0.40 
%#NEUT 3.63 #LYMP 0.93 #MONO 0.44 #EOS 0.25 #BASO 0.02 MANUAL DIFF NOT IND 
GLUCOSE 102.0 mg/dLSODIUM 146.0 mmol/LPOTASSIUM 4.20 mmol/LCHLORIDE 113.0 
mmol/LCO2 23.0 mmol/LBUN 15.0 mg/dLCREATININE 1.0 mg/dLSGOT/AST 12.0 
IU/LSGPT/ALT 17.0 IU/LALK PHOS 60.0 IU/LTOTAL PROTEIN 6.90 g/dLALBUMIN 4.20 
g/dLTOTAL BILI 0.40 mg/dLCALCIUM 9.70 mg/dLAGE 60 GFR NonAA 57 GFR AA 69 eGFR 57
 eGFR AA* >60 

 

                          2011 11:49 AM        Cholest Cry Stone Ql .0 %LDLc SerPl-mCnc 130.0 mg/dLHDLc

 SerPl-mCnc 43.0 mg/dLTrigl SerPl-mCnc 111.0 mg/dLGlucose SerPl-mCnc 102.0 mg/dL

 

                          2011 11:52 AM        Pap Smear Declined 

 

                          2011 11:28 AM        Lithium 2.080 mmol/LGLUCOSE 102.0 mg/dLSODIUM 135.0 mmol/LPOTASSIUM

 3.90 mmol/LCHLORIDE 106.0 mmol/LCO2 21.0 mmol/LBUN 12.0 mg/dLCREATININE 1.30 
mg/dLCALCIUM 10.70 mg/dLAGE 60 GFR NonAA 42 GFR AA 51 eGFR 42 eGFR AA* 51 

 

                          2011 8:58 AM          Lithium 0.690 mmol/L

 

                          2011 2:38 PM         VITAMIN B12 3483.0 pg/mL

 

                          2013 3:35 PM          WBC 5.1 RBC 3.73 HGB 11.70 g/dLHCT 36.40 %MCV 98.0 fLMCH 31.40

 pgMCHC 32.10 g/dLRDW SD 47 RDW CV 13.10 %MPV 9.80 fLPLT 224 NRBC# 0.00 NRBC% 
0.0 %NEUT 66.80 %%LYMP 19.10 %%MONO 9.0 %%EOS 4.90 %%BASO 0.20 %#NEUT 3.42 #LYMP
 0.98 #MONO 0.46 #EOS 0.25 #BASO 0.01 MANUAL DIFF NOT IND GLUCOSE 88.0 
mg/dLSODIUM 141.0 mmol/LPOTASSIUM 4.10 mmol/LCHLORIDE 110.0 mmol/LCO2 22.0 
mmol/LBUN 22.0 mg/dLCREATININE 1.10 mg/dLCALCIUM 9.80 mg/dLAGE 62 GFR NonAA 50 
GFR AA 61 eGFR 50 eGFR AA* 60 Lithium 0.760 mmol/L

 

                          2013 11:02 AM        TRIGLYCERIDES 106.0 mg/dLCHOLESTEROL 181.0 mg/dLHDL 46.0 mg/dLTOT

 CHOL/HDL 3.9 LDL (CALC) 114.0 mg/dLVITAMIN D 41.10 ng/mL

 

                          10/17/2014 10:10 AM       WBC 5.0 RBC 3.66 HGB 11.60 g/dLHCT 36.80 %.0 fLMCH 31.70

 pgMCHC 31.50 g/dLRDW SD 50 RDW CV 13.50 %MPV 10.10 fLPLT 209 NRBC# 0.00 NRBC% 
0.0 %NEUT 69.20 %%LYMP 21.0 %%MONO 6.40 %%EOS 3.20 %%BASO 0.20 %#NEUT 3.46 #LYMP
 1.05 #MONO 0.32 #EOS 0.16 #BASO 0.01 MANUAL DIFF NOT IND GLUCOSE 100.0 
mg/dLSODIUM 148.0 mmol/LPOTASSIUM 3.90 mmol/LCHLORIDE 114.0 mmol/LCO2 26.0 
mmol/LBUN 12.0 mg/dLCREATININE 1.20 mg/dLSGOT/AST 9.0 IU/LSGPT/ALT <6 IU/LALK 
PHOS 82.0 IU/LTOTAL PROTEIN 6.90 g/dLALBUMIN 4.0 g/dLTOTAL BILI 0.40 
mg/dLCALCIUM 10.50 mg/dLAGE 64 GFR NonAA 45 GFR AA 55 eGFR 45 eGFR AA* 55 
TRIGLYCERIDES 96.0 mg/dLCHOLESTEROL 155.0 mg/dLHDL 38.0 mg/dLTOT CHOL/HDL 4.1 
LDL (CALC) 98.0 mg/dLLithium 0.850 mmol/LCancer Antigen (CA) 125 8.30 U/mL

 

                          2015 10:25 AM        Lithium 0.790 mmol/LWBC 4.8 RBC 3.44 HGB 11.0 g/dLHCT 35.20 

%.0 fLMCH 32.0 pgMCHC 31.30 g/dLRDW SD 53 RDW CV 14.0 %MPV 9.30 fLPLT 210
 NRBC# 0.00 NRBC% 0.0 %NEUT 70.80 %%LYMP 17.20 %%MONO 8.10 %%EOS 3.50 %%BASO 
0.40 %#NEUT 3.41 #LYMP 0.83 #MONO 0.39 #EOS 0.17 #BASO 0.02 MANUAL DIFF NOT IND 
TRIGLYCERIDES 107.0 mg/dLCHOLESTEROL 174.0 mg/dLHDL 43.0 mg/dLTOT CHOL/HDL 4.0 
LDL (CALC) 110.0 mg/dLGLUCOSE 90.0 mg/dLSODIUM 145.0 mmol/LPOTASSIUM 3.80 
mmol/LCHLORIDE 115.0 mmol/LCO2 24.0 mmol/LBUN 17.0 mg/dLCREATININE 1.30 
mg/dLSGOT/AST 18.0 IU/LSGPT/ALT 17.0 IU/LALK PHOS 56.0 IU/LTOTAL PROTEIN 6.70 
g/dLALBUMIN 3.90 g/dLTOTAL BILI 0.40 mg/dLCALCIUM 9.80 mg/dLAGE 64 GFR NonAA 41 
GFR AA 50 eGFR 41 eGFR AA* 50 

 

                          2015 8:50 AM        WBC 5.8 RBC 3.29 HGB 10.70 g/dLHCT 34.0 %.0 fLMCH 32.50

 pgMCHC 31.50 g/dLRDW SD 52 RDW CV 13.60 %MPV 9.60 fLPLT 223 NRBC# 0.00 NRBC% 
0.0 %NEUT 69.60 %%LYMP 18.90 %%MONO 8.50 %%EOS 2.80 %%BASO 0.20 %#NEUT 4.03 
#LYMP 1.09 #MONO 0.49 #EOS 0.16 #BASO 0.01 MANUAL DIFF NOT IND Lithium 0.620 
mmol/LGLUCOSE 83.0 mg/dLSODIUM 139.0 mmol/LPOTASSIUM 3.90 mmol/LCHLORIDE 109.0 
mmol/LCO2 22.0 mmol/LBUN 19.0 mg/dLCREATININE 1.40 mg/dLSGOT/AST 19.0 
IU/LSGPT/ALT 21.0 IU/LALK PHOS 55.0 IU/LTOTAL PROTEIN 6.50 g/dLALBUMIN 3.90 
g/dLTOTAL BILI 0.50 mg/dLCALCIUM 9.60 mg/dLAGE 65 GFR NonAA 38 GFR AA 46 eGFR 38
 eGFR AA* 46 TRIGLYCERIDES 121.0 mg/dLCHOLESTEROL 192.0 mg/dLHDL 51.0 mg/dLTOT 
CHOL/HDL 3.8 .0 mg/dLFREE T4 0.79 TSH 1.210 uIU/mLHemoglobin A1c 5.40 
%Estim. Avg Glu (eAG) 108 

 

                          3/15/2016 8:08 AM         VITAMIN B12 696.0 pg/mL

 

                          3/23/2016 8:26 AM         WBC 7.0 RBC 3.61 HGB 11.80 g/dLHCT 37.70 %.0 fLMCH 32.70

 pgMCHC 31.30 g/dLRDW SD 49 RDW CV 12.50 %MPV 10.0 fLPLT 207 NRBC# 0.00 NRBC% 
0.0 %NEUT 73.60 %%LYMP 16.40 %%MONO 6.60 %%EOS 3.0 %%BASO 0.30 %#NEUT 5.15 #LYMP
 1.15 #MONO 0.46 #EOS 0.21 #BASO 0.02 MANUAL DIFF NOT IND Lithium 0.940 
mmol/LGLUCOSE 108.0 mg/dLSODIUM 143.0 mmol/LPOTASSIUM 4.30 mmol/LCHLORIDE 110.0 
mmol/LCO2 27.0 mmol/LBUN 16.0 mg/dLCREATININE 1.60 mg/dLSGOT/AST 13.0 
IU/LSGPT/ALT 7.0 IU/LALK PHOS 71.0 IU/LTOTAL PROTEIN 6.80 g/dLALBUMIN 4.0 
g/dLTOTAL BILI 0.20 mg/dLCALCIUM 10.40 mg/dLAGE 65 GFR NonAA 32 GFR AA 39 eGFR 
32 eGFR AA* 39 TRIGLYCERIDES 113.0 mg/dLCHOLESTEROL 169.0 mg/dLHDL 42.0 mg/dLTOT
 CHOL/HDL 4.0 LDL (CALC) 104.0 mg/dLFREE T4 0.86 TSH 2.20 uIU/mLHemoglobin A1c 
5.20 %Estim. Avg Glu (eAG) 103 

 

                          3/25/2016 9:17 AM         COLOR YELLOW APPEARANCE CLEAR SPEC GRAV 1.010 pH 7.0 PROTEIN 

NEGATIVE GLUCOSE NEGATIVE mg/dLKETONE NEGATIVE BILIRUBIN NEGATIVE BLOOD NEGATIVE
 NITRITE NEGATIVE [place] SCREEN SMALL MICRO IND? SEE BELOW WBC/HPF 0-5 RBC/HPF 
NEGATIVE CASTS/LPF NEGATIVE /LPFCRYSTALS NEGATIVE MUCOUS THRDS NEGATIVE BACTERIA
 NEGATIVE EPITH CELLS FEW SQUAMOUS /HPFTRICHOMONAS NEGATIVE YEAST NEGATIVE 

 

                          2016 6:00 AM        GLUCOSE 91.0 mg/dLSODIUM 143.0 mmol/LPOTASSIUM 3.60 mmol/LCHLORIDE

 112.0 mmol/LCO2 23.0 mmol/LBUN 22.0 mg/dLCREATININE 1.20 mg/dLSGOT/AST 15.0 
IU/LSGPT/ALT 12.0 IU/LALK PHOS 61.0 IU/LTOTAL PROTEIN 5.40 g/dLALBUMIN 3.10 
g/dLTOTAL BILI 0.40 mg/dLCALCIUM 8.40 mg/dLAGE 66 GFR NonAA 45 GFR AA 55 eGFR 45
 eGFR AA* 55 WBC 3.0 RBC 3.05 HGB 9.80 g/dLHCT 32.10 %.0 fLMCH 32.10 
pgMCHC 30.50 g/dLRDW SD 54 RDW CV 14.20 %MPV 10.10 fLPLT 170 NRBC# 0.00 NRBC% 
0.0 %NEUT 50.70 %%LYMP 32.60 %%MONO 10.50 %%EOS 5.90 %%BASO 0.30 %#NEUT 1.54 
#LYMP 0.99 #MONO 0.32 #EOS 0.18 #BASO 0.01 MANUAL DIFF NOT IND 

 

                          2016 2:09 PM        COLOR YELLOW APPEARANCE CLEAR SPEC GRAV 1.010 pH 5.0 PROTEIN

 30 GLUCOSE NEGATIVE mg/dLKETONE NEGATIVE BILIRUBIN NEGATIVE BLOOD LARGE NITRITE
 NEGATIVE [facility] SCREEN MODERATE MICRO INDICATED? SEE BELOW WBC/HPF  RBC/HPF
 20-50 CASTS/LPF NEGATIVE /LPFCRYSTALS NEGATIVE MUCOUS THRDS NEGATIVE BACTERIA 
NEGATIVE EPITH CELLS FEW SQUAMOUS /HPFTRICHOMONAS NEGATIVE YEAST NEGATIVE CULT 
SET UP? YES 

 

                          1/3/2017 4:08 PM          COLOR YELLOW APPEARANCE HAZY SPEC GRAV 1.015 pH 6.0 PROTEIN 30

 GLUCOSE NEGATIVE mg/dLKETONE NEGATIVE BILIRUBIN NEGATIVE BLOOD MODERATE NITRITE
 NEGATIVE [facility] SCREEN LARGE MICRO INDICATED? SEE BELOW WBC/-200 RBC/HPF 
5-10 CASTS/LPF NEGATIVE /LPFCRYSTALS NEGATIVE MUCOUS THRDS NEGATIVE BACTERIA 
NEGATIVE EPITH CELLS 1+ SQUAMOUS /HPFTRICHOMONAS NEGATIVE YEAST NEGATIVE CULT 
SET UP? YES 







History Of Immunizations







       Name    Date Admin    Mfg Name    Mfg Code    Trade Name    Lot#    Route    Inj    Vis Given    Vis

 Pub                                    CVX

 

        Influenza    2008    Not Entered    NE      Not Entered            Not Entered    Not Entered

                    1            999

 

        X       12/19/2008    [employer]    MSD     Pneumovax 23            Intramuscular    Not Entered

                    1            999

 

           Influenza    10/15/2010    [employer]    NOV        Fluvirin > 12 Years    

[id number]     Intramuscular    Left Deltoid    10/15/2010    2009    999

 

          X         3/23/2015    [facility]    [facility]       Prevnar 13    [id number]    Intramuscular

                Right Gluteous Medius    3/23/2015       2013       109







History of Past Illness







                    Name                Date of Onset       Comments

 

                    Peritoneal Neoplasm, Malignant                         

 

                    Hyperlipidemia                           

 

                    Bipolar disorder, unspecified                         

 

                    Artificial opening status; colostomy                         

 

                    B12 deficiency                           

 

                    Anemia, Pernicious                         

 

                    Arthritis unspecified                         

 

                    cervical cancer                          

 

                    Artificial opening status; colostomy    2010  1:10PM     

 

                    Bipolar disorder, unspecified    2010  1:10PM     

 

                    Hyperlipidemia      2010  1:10PM     

 

                    Anemia, Pernicious    2010  1:10PM     

 

                    Postoperative Follow-Up    2010  1:55PM     

 

                    Postoperative Follow-Up    Mar  8 2010 10:57AM     

 

                    Artificial opening status; colostomy    Mar  8 2010  1:19PM     

 

                    Peritoneal Neoplasm, Malignant    Mar  8 2010  1:19PM     

 

                    Artificial opening status; colostomy    2010  1:40PM     

 

                    Hyperlipidemia      2010  1:40PM     

 

                    Anemia, Pernicious    2010  1:40PM     

 

                    Peritoneal Neoplasm, Malignant    2010  1:40PM     

 

                    Arthritis unspecified    2010  1:40PM     

 

                    Anemia of Chronic Illness    2010  1:40PM     

 

                    Tinea corporis      2010  3:17PM     

 

                    Bipolar disorder, unspecified    2010  1:33PM     

 

                    Hyperlipidemia      2010  1:33PM     

 

                    Anemia, Pernicious    2010  1:33PM     

 

                    Peritoneal Neoplasm, Malignant    2010  1:33PM     

 

                    B12 deficiency      2010  1:33PM     

 

                    Ethmoidal Sinusitis, Acute    Sep 21 2010  3:53PM     

 

                    Wheezing            Sep 21 2010  3:53PM     

 

                    Flu                 Oct 15 2010  1:40PM     

 

                    Bipolar disorder, unspecified    Oct 15 2010  1:42PM     

 

                    Hyperlipidemia      Oct 15 2010  1:42PM     

 

                    Anemia, Pernicious    Oct 15 2010  1:42PM     

 

                    Peritoneal Neoplasm, Malignant    Oct 15 2010  1:42PM     

 

                    Bipolar disorder, unspecified    2011 12:01PM     

 

                    Hyperlipidemia      2011 12:01PM     

 

                    Anemia, Pernicious    2011 12:01PM     

 

                    Peritoneal Neoplasm, Malignant    2011 12:01PM     

 

                    Bipolar disorder, unspecified    Apr 15 2011 10:55AM     

 

                    Major Depression    2011 10:11AM     

 

                    Bipolar Disorder    2011 10:11AM     

 

                    Cancer              May 10 2011  4:16PM     

 

                    Major Depression    May 10 2011  3:16PM     

 

                    Bipolar Disorder    May 10 2011  3:16PM     

 

                    Hypercalcemia       May 23 2011  2:47PM     

 

                    Bipolar disorder, unspecified    May 23 2011  2:47PM     

 

                    Colon Cancer, Personal History    May 23 2011  2:47PM     

 

                    Bipolar Disorder    May 31 2011  4:39PM     

 

                    Depressive Disorder    2011 10:01AM     

 

                    Vitamin B12 deficiency    2011 10:01AM     

 

                    Vitamin D Deficiency    2011  5:07PM     

 

                    Anemia, Vitamin B12 Deficiency    2011  5:07PM     

 

                    B12 deficiency      2011  3:56PM     

 

                    Routine gynecological examination    Aug  4 2011  9:08AM     

 

                    Screening Examination for Breast Cancer    Aug  4 2011  9:08AM     

 

                    Tinea Corporis      Aug  4 2011  9:08AM     

 

                    Depressive Disorder    Sep 23 2011  8:47AM     

 

                    Contact Dermatitis    Sep 23 2011  8:47AM     

 

                    Anemia, Pernicious    Sep 23 2011  8:47AM     

 

                    B12 deficiency      Sep 23 2011  8:47AM     

 

                    B12 deficiency      Sep 27 2011  2:58PM     

 

                    B12 deficiency      Oct 20 2011  2:34PM     

 

                    Flu                 Dec  9 2011  3:16PM     

 

                    B12 deficiency      Dec  9 2011  3:17PM     

 

                    B12 deficiency      2012  4:52PM     

 

                    B12 deficiency      2012 11:10AM     

 

                    B12 deficiency      2012  3:37PM     

 

                    B12 deficiency      May  3 2012  4:10PM     

 

                    B12 deficiency      2012  2:54PM     

 

                    B12 deficiency      2012 11:23AM     

 

                    B12 deficiency      Aug  9 2012  2:08PM     

 

                    B12 deficiency      Sep  6 2012  4:36PM     

 

                    B12 deficiency      Oct 16 2012 10:23AM     

 

                    Flu                 2013  3:11PM     

 

                    Bipolar disorder, unspecified    2013  2:48PM     

 

                    Anemia, Pernicious    Feb  2013  2:48PM     

 

                    B12 deficiency      Feb  2013  2:48PM     

 

                    Extrapyramidal abnormal movement disorder    Feb  2013  2:48PM     

 

                    B12 deficiency      Apr  3 2013 12:03PM     

 

                    Bipolar disorder, unspecified    May  7 2013  1:31PM     

 

                    Anemia, Pernicious    May  7 2013  1:31PM     

 

                    B12 deficiency      May  7 2013  1:31PM     

 

                    Extrapyramidal abnormal movement disorder    May  7 2013  1:31PM     

 

                    B12 deficiency      2013  3:42PM     

 

                    B12 deficiency      2013  1:31PM     

 

                    Hyperlipidemia      Aug  7 2013 10:37AM     

 

                    Vitamin D Deficiency    Aug  7 2013 10:37AM     

 

                    Bipolar disorder, unspecified    Aug  7 2013 10:37AM     

 

                    Anemia, Pernicious    Aug  7 2013 10:37AM     

 

                    B12 deficiency      Aug  7 2013 10:37AM     

 

                    B12 deficiency      Sep 25 2013 11:15AM     

 

                    B12 deficiency      Dec 11 2013  3:16PM     

 

                    B12 deficiency      Mar  6 2014  1:48PM     

 

                    B12 deficiency      May 21 2014  3:17PM     

 

                    Screening Examination for Breast Cancer    2014  3:23PM     

 

                    Periumbilical abdominal pain    2014  3:23PM     

 

                    B12 deficiency      Jul 10 2014  2:52PM     

 

                    Anemia, Vitamin B12 Deficiency    Aug 13 2014  4:50PM     

 

                    Bipolar disorder    Oct 16 2014 11:13AM     

 

                    Hyperlipidemia      Oct 16 2014 11:13AM     

 

                    Anemia, Pernicious    Oct 16 2014 11:13AM     

 

                    Peritoneal Neoplasm, Malignant    Oct 16 2014 11:13AM     

 

                    Screening breast examination    Oct 16 2014 11:13AM     

 

                    Weight loss         Oct 16 2014 11:13AM     

 

                    Anemia, Pernicious    Mar 23 2015  2:57PM     

 

                    B12 deficiency      Mar 23 2015  2:57PM     

 

                    Need for Prevnar vaccine    Mar 23 2015  2:57PM     

 

                    Bipolar disorder    Mar 23 2015  2:57PM     

 

                    Hyperlipidemia      Mar 23 2015  2:57PM     

 

                    Anemia, Pernicious    Mar 23 2015  2:57PM     

 

                    Peritoneal Neoplasm, Malignant    Mar 23 2015  2:57PM     

 

                    B12 deficiency      May  4 2015  4:48PM     

 

                    Hyperlipidemia      May 13 2015  9:56AM     

 

                    Anemia              May 13 2015  9:56AM     

 

                    Bipolar disorder    May 13 2015  9:56AM     

 

                    Bipolar disorder    May 14 2015  3:27PM     

 

                    Hyperlipidemia      May 14 2015  3:27PM     

 

                    Anemia, Pernicious    May 14 2015  3:27PM     

 

                    Peritoneal Neoplasm, Malignant    May 14 2015  3:27PM     

 

                    B12 deficiency      2015  2:20PM     

 

                    B12 deficiency      2015 11:34AM     

 

                    B12 deficiency      Aug 18 2015  9:06AM     

 

                    Tinea Corporis      Sep 18 2015  8:54AM     

 

                    B12 deficiency      Sep 18 2015  8:54AM     

 

                    B12 deficiency      2015 10:28AM     

 

                    Herpes zoster without complication    Dec  3 2015  9:52AM     

 

                    B12 deficiency      Dec 23 2015 11:21AM     

 

                    B12 deficiency      2016  4:51PM     

 

                    Vitamin B 12 deficiency    Mar 14 2016  5:35PM     

 

                    B12 deficiency      Mar 15 2016 12:14PM     

 

                    B12 deficiency      May  5 2016 11:30AM     

 

                    Edema               May  5 2016 11:30AM     

 

                    Dermatitis          May  5 2016 11:30AM     

 

                    Edema               May 17 2016  8:38AM     

 

                    Shortness of breath    May 17 2016  8:38AM     

 

                    Bilateral edema of lower extremity    2016  2:06PM     

 

                    B12 deficiency      2016  2:06PM     

 

                    B12 deficiency      2016 11:50AM     

 

                    B12 deficiency      2016 11:20AM     

 

                    Diarrhea            Aug  2 2016  3:13PM     

 

                    B12 deficiency      Aug 24 2016 11:10AM     

 

                    Encounter for screening mammogram for breast cancer    Aug 24 2016 11:44AM     

 

                    B12 deficiency      Sep 28 2016  2:35PM     

 

                    B12 deficiency      Dec 15 2016  2:02PM     

 

                    Dysuria             Dec 29 2016 12:14PM     

 

                    Hematuria           Fidencio  3 2017  1:33PM     

 

                    Constipation by delayed colonic transit    2017  1:52PM     

 

                    Ileus               2017  1:52PM     

 

                    UTI (urinary tract infection)    Fidencio 15 2017  3:39PM     

 

                    Acute cystitis with hematuria    2017 11:07AM     

 

                    B12 deficiency      2017 11:07AM     

 

                    B12 deficiency      2017 11:40AM     

 

                    B12 deficiency      2017  4:07PM     







Payers







           Insurance Name    Company Name    Plan Name    Plan Number    Policy Number    Policy Group

 Number                                 Start Date

 

                    Medicare RHC Medicare RHC              [id number]              N/A

 

                          [employer]    [employer]                 [id number]

                                                    

 

                    Medicare Part A    Medicare - Lab/Xray              [id number]              2006

 

                    Medicare Part B    Medicare [place]              [id number]              2006

 

                          [employer]    [employer]                 50 percent

                                                    2009

 

                    [facility]    [employer]              [id number]              N/A

 

                    Medicare Part A    Medicare Part A              [id number]              N/A

 

                    Medicare Part A    Medicare Part A              [id number]              2006









History of Encounters







                    Visit Date          Visit Type          Provider

 

                    2017           Nurse visit         Bhupinder Louise DO

 

                    2017           Office visit        Radha Ontiveros APRN

 

                    1/15/2017           Office visit        Aj Tapia NP

 

                    2017            Office visit        Devin Masterson MD

 

                    2016          [facility]            Devin Masterson MD

 

                    12/15/2016          Nurse visit         Bhupinder Aspen DO

 

                    2016           Nurse visit         Bret Forte APRN

 

                    2016           Nurse visit         Bhupinder Aspen DO

 

                    2016            Office visit        Bhupinder Aspen DO

 

                    2016           Nurse visit         Bhupinder Aspen DO

 

                    2016           Office visit        Bret Forte APRN

 

                    2016            Office visit        Bhupinder Aspen DO

 

                    3/15/2016           Nurse visit         Bhupinder Aspen DO

 

                    2016            Nurse visit         Bhupinder Aspen DO

 

                    2015          Nurse visit         Bhupinder Aspen DO

 

                    12/3/2015           Office visit        Bhupinder Aspen DO

 

                    2015          Nurse visit         Bhupinder Aspen DO

 

                    2015           Office visit        Bhupinder Aspen DO

 

                    2015           Nurse visit         Bhupinder Aspen DO

 

                    2015            Nurse visit         Bhupinder Aspen DO

 

                    2015            Nurse visit         Bhupinder Aspen DO

 

                    2015           Office visit        Bhupinder Aspen DO

 

                    2015            Nurse visit         Bhupinder Aspen DO

 

                    3/23/2015           Office visit        Bhupinder Aspen DO

 

                    10/16/2014          Office visit        Bhupinder Aspen DO

 

                    2014           Nurse visit         Radha Ontiveros APRN

 

                    7/10/2014           Nurse visit         Bhupinder Aspen DO

 

                    2014           Office visit        Bhupinder Aspen DO

 

                    2014           Nurse visit         Bhupinder Aspen DO

 

                    3/6/2014            Nurse visit         Bhupinder Aspen DO

 

                    2014            [facility]            EARNEST Lopez MD

 

                    2013          Nurse visit         Bhupinder Aspen DO

 

                    2013           Nurse visit         Bhupinder Aspen DO

 

                    2013            Office visit        Bhupinder Aspen DO

 

                    2013            Nurse visit         Bhupinder Aspen DO

 

                    2013            Nurse visit         Bhupinder Aspen DO

 

                    2013            Office visit        Bhupinder Aspen DO

 

                    4/3/2013            Nurse visit         Bhupinder Aspen DO

 

                    2013            Office visit        Bhupinder Aspen DO

 

                    10/16/2012          Nurse visit         Bhupinder Aspen DO

 

                    2012            Nurse visit         Bhupinder Aspen DO

 

                    2012            Voided              Bhupinder Aspen DO

 

                    2012            Nurse visit         Bhupinder Aspen DO

 

                    2012            Nurse visit         Bhupinder Aspen DO

 

                    2012           Nurse visit         Bhupinder Aspen DO

 

                    5/3/2012            Nurse visit         Bhupinder Aspen DO

 

                    2012           Nurse visit         Bhupinder Aspen DO

 

                    2012           Nurse visit         Bhupinder Aspen DO

 

                    2012           Nurse visit         Bhupinder Aspen DO

 

                    2011           Nurse visit         Bhupinder Aspen DO

 

                    10/20/2011          Nurse visit         Bhupinder Aspen DO

 

                    2011           Office visit        Bhupinder Aspen DO

 

                    2011           Nurse visit         Radha GREEN

 

                    2011            Office visit        Bhupinder Aspen DO

 

                    2011           Nurse visit         Bhupinder Aspen DO

 

                    2011            Office visit        Bhupinder Aspen DO

 

                    2011           Office visit        Bhupinder Aspen DO

 

                    5/10/2011           Office visit        Bhupinder Aspen DO

 

                    2011           Office visit        Bhupinder Aspen DO

 

                    4/15/2011           Office visit        Devin Angel DO

 

                    2011           Office visit        Devin Angel DO

 

                    10/15/2010          Office visit        Devin Angel DO

 

                    2010           Office visit        Devin Angel DO

 

                    2010            Office visit        Devin Angel DO

 

                    2010           Office visit        Devin Angel DO

 

                    2010            Office visit        Devin Angel DO

 

                    3/8/2010            Office visit        Devin Masterson MD

 

                    2010            Surgery             Devin Masterson MD

 

                    2010            Office visit        Devin Angel DO

 

                    2010           Surgery             Devin Masterson MD

 

                    2010           Hospital            Devin Masterson MD

 

                    2010           [facility]            Devin Masterson MD

 

                    10/22/2009          Office visit        Devin Angel DO

## 2019-06-26 NOTE — XMS REPORT
MU2 Ambulatory Summary

                             Created on: 2017



Pauline Gan

External Reference #: [id number]

: 1950

Sex: Female



Demographics







                          Address                   [address]

[place]  [id number]

 

                          Home Phone                [phone]

 

                          Preferred Language        English

 

                          Marital Status            Legally 

 

                          Congregation Affiliation     Unknown

 

                          Race                      White

 

                          Ethnic Group              Not  or 





Author







                          Author                    Bhupinder Louise

 

                          [facility]

 

                          Address                   [address]

[place]  [id number]



 

                          Phone                     [phone]







Care Team Providers







                    Care Team Member Name    Role                Phone

 

                    Bhupinder Louise    PCP                 Unavailable

 

                    Bhupinder Louise    PreferredProvider    Unavailable







Allergies and Adverse Reactions







                    Name                Reaction            Notes

 

                    NO KNOWN DRUG ALLERGIES                         







Plan of Treatment







             Planned Activity    Comments     Planned Date    Planned Time    Plan/Goal

 

             Injection,Subcutaneous/Intramuscul, [facility]                 2017    12:00 AM      







Medications







                                        Active 

 

             Name         Start Date    Estimated Completion Date    SIG          Comments

 

                Latuda 20 mg oral tablet                                    take 1 tablet (20 mg) by oral route once daily with

 food (at least 350 calories)            

 

             pravastatin 40 mg oral tablet    3/30/2015                 TAKE 1 TABLET BY MOUTH DAILY     

 

                Namenda XR 28 mg oral capsule,sprinkle,ER 24hr    2015                       take 1 capsule (28

 mg) by oral route once daily            

 

                Namenda XR 28 mg oral capsule,sprinkle,ER 24hr    2016                       take 1 capsule (28

 mg) by oral route once daily            

 

                potassium chloride 10 mEq oral tablet extended release    2016                       take 1 tablet

 (10 meq) by oral route once daily       

 

             pravastatin 40 mg oral tablet    2016                 TAKE 1 TABLET BY MOUTH DAILY     

 

                Vitamin B-12 1,000 mcg/mL injection solution    2016                       inject 1 milliliter 

(1,000 mcg) by intramuscular route once a month     

 

                potassium chloride 10 mEq oral tablet extended release    2016                      take 1 tablet

 (10 meq) by oral route once daily       

 

                Namenda XR 28 mg oral capsule,sprinkle,ER 24hr    2016                      TAKE 1 CAPSULE BY

 MOUTH EVERY DAY                         

 

                furosemide 40 mg oral tablet    2016                      take 1 tablet (40 mg) by oral route

 once daily                              

 

                mirtazapine 45 mg oral tablet                                    take 1 tablet (45 mg) by oral route once daily

 before bedtime                          

 

             Fish Oil 300-1,000 mg oral capsule                              take 1 capsule by oral route daily     

 

             Vitamin D3 1,000 unit oral tablet                              take 1 tablet by oral route daily     

 

                Calcium 600 600 mg calcium (1,500 mg) oral tablet                                    take 1 tablet by oral route

 daily                                   

 

                Aspirin Low Dose 81 mg oral tablet,delayed release (DR/EC)                                    take 1 tablet 

(81 mg) by oral route once daily         

 

                metoclopramide HCl 10 mg oral tablet    2017                       take 1 tablet by oral route 

2 times a day for 50 days                

 

             furosemide 40 mg oral tablet    2017                 TAKE 1 TABLET BY MOUTH DAILY     

 

                Namenda XR 28 mg oral capsule,sprinkle,ER 24hr    2017                       TAKE 1 CAPSULE BY 

MOUTH EVERY DAY                          

 

                Linzess 72 mcg oral capsule    2017                       take 1 capsule (72 mcg) by oral route

 once daily on an empty stomach at least 30 minutes before 1st meal of the day     



 

             pravastatin 40 mg oral tablet    2017                 TAKE 1 TABLET BY MOUTH DAILY     

 

                potassium chloride 10 mEq oral tablet extended release    2017                       TAKE 2 TABLETs

 BY MOUTH twice DAILY for one week       

 

                cefuroxime axetil 500 mg oral tablet    2017        take 1 tablet (500 mg)

 by oral route 2 times per day for 10 days     

 

                metoclopramide HCl 10 mg oral tablet    2017                       TAKE 1 TABLET BY ORAL ROUTE 

2 TIMES A DAY FOR 50 DAYS                









                                         

 

             Name         Start Date    Expiration Date    SIG          Comments

 

             Reglan 10mg    3/29/2010    2010    one ac and hs     

 

                Keflex 500 mg oral capsule    2010       10/1/2010       take 1 capsule (500 mg) by oral

 route every 6 hours for 10 days         

 

                Bactrim -160 mg oral tablet    2011       take 1 tablet by oral route

 every 12 hours for 7 days               

 

                triamcinolone acetonide 0.1 % topical cream    2011      apply a thin

 layer to the affected area(s) by topical route 2 times per day     

 

                sertraline 100 mg oral tablet    4/10/2012       5/10/2012       take 1.5 tablets by oral route

 daily for 30 days                       

 

                ergocalciferol (vitamin D2) 50,000 unit oral capsule    4/15/2013       2013       TAKE

 ONE CAPSULE BY MOUTH ONCE A WEEK        

 

                CYANOCOBALAM 1000MCGINJ 1000 milliliter    2013       INJECT 1ML INTRAMUSCULAR

 ONCE A MONTH                            

 

                pravastatin 40 mg oral tablet    3/25/2014       3/20/2015       TAKE ONE TABLET BY MOUTH EVERY

 DAY                                     

 

                          Zostavax (PF) 19,400 unit/0.65 mL subcutaneous suspension for reconstitution    3/23/2015

                    3/24/2015           inject 0.65 milliliter by subcutaneous route once     

 

                famciclovir 500 mg oral tablet    12/3/2015       12/10/2015      take 1 tablet (500 mg) by

 oral route every 8 hours for 7 days     

 

                furosemide 40 mg oral tablet    2016      take 1 tablet (40 mg) by oral

 route once daily                        

 

                Cipro 500 mg oral tablet    2016       take 1 tablet (500 mg) by oral route

 2 times per day for 5 days              

 

                Bactrim -160 mg oral tablet    2016        take 1 tablet by oral route

 every 12 hours for 7 days               

 

                metoclopramide HCl 10 mg oral tablet    2017       take 1 tablet by oral

 route 2 times a day for 50 days         

 

                Macrobid 100 mg oral capsule    2017       take 1 capsule (100 mg) by oral

 route 2 times per day with food for 7 days     

 

                Augmentin 875-125 mg oral tablet    2017       take 1 tablet by oral route

 every 12 hours for 7 days               

 

                amoxicillin 500 mg oral tablet    2017       take 1 tablet (500 mg) by oral

 route every 12 hours for 7 days         

 

                ciprofloxacin HCl 500 mg oral tablet    2017       take 1 tablet (500 mg)

 by oral route every 12 hours for 5 days     









                                        Discontinued 

 

             Name         Start Date    Discontinued Date    SIG          Comments

 

                Tylenol 325 mg oral tablet                    2013        take 1 - 2 tablets (325 -650 mg) by oral

 route every 4-6 hours as needed         

 

                Calcium 600 + D(3) 600 mg(1,500mg) -400 unit oral tablet                    2011       take 1 tablet

 by oral route 2 times a day            no longer taking

 

                Vitamin B-12 1,000 mcg oral tablet extended release    2010       take 1

 tablet by oral route daily             no longer taking

 

                Antifungal (clotrimazole) 1 % topical cream    2010       apply to the 

affected and surrounding areas of skin by topical route 2 times per day morning 
and evening                              

 

                sertraline 100 mg oral tablet    5/10/2011       2011       take 2 tablets (200 mg) by 

oral route once daily                   discontinued by Dr. Serrano

 

                mirtazapine 15 mg oral tablet                    2011        take 1 tablet (15 mg) by oral route 

once daily before bedtime               Dr. Serrano

 

                mirtazapine 15 mg oral tablet                    2011        take 1 tablet (15 mg) by oral route 

once daily before bedtime               dc'd by Dr. Serrano

 

                Pristiq 50 mg oral tablet extended release 24 hr                    2013        take 1 tablet (50

 mg) by oral route once daily           Dr. Serrano

 

                Pristiq 50 mg oral tablet extended release 24 hr                    2013        take 1 tablet (50

 mg) by oral route once daily           dose updated

 

                Vitamin B-12 1,000 mcg/mL injection solution    2011        inject 1 milliliter

 (1,000 mcg) by intramuscular route once a month    on list already

 

                    syringe with needle 1 mL 25 gauge x 1" miscellaneous syringe    2011

                          use for injection once a month     

 

                clotrimazole 1 % topical cream    2011        apply to the affected and surrounding

 areas of skin by topical route 2 times per day in the morning and evening     

 

                Vitamin D2 50,000 unit oral capsule    2011        take 1 capsule (50,000

 unit) by oral route once weekly        generic on list

 

                Pravachol 40 mg oral tablet    2012        take 1 tablet (40 mg) by oral 

route once daily for 90 days            generic on list

 

                lithium carbonate 300 mg oral capsule    2012        take 1 capsule by oral

 route daily                            dose updated

 

                Pristiq 100 mg oral tablet extended release 24 hr                    4/10/2012       take 1 and 1/2 

tablet (150 mg) by oral route once daily    Mental Health provider

 

                Pristiq 100 mg oral tablet extended release 24 hr                    4/10/2012       take 1 and 1/2 

tablet (150 mg) by oral route once daily    Discontinued by Dr Efrain [facility] at [facility]

 

                hydroxyzine HCl 50 mg oral tablet    10/16/2014      2015       take 1 tablet (50 mg) 

by oral route at bedtime                 

 

                lithium carbonate 300 mg oral capsule    2015       take 1 capsule (300

 mg) by oral route 2 for 30 days         

 

                fluconazole 100 mg oral tablet    2015       12/3/2015       take 1 tablet (100 mg) by 

oral route once a week                   

 

                ketoconazole 2 % topical cream    2015       12/3/2015       apply to the affected area(s)

 by topical route 2 times per day        

 

                prednisone 10 mg oral tablet    12/3/2015       2016        take 2 tablets (20 mg) by oral

 route once daily for 4 days 1 tablet daily for 4 days 0.5 tablet daily for 4 
days                                     

 

                triamcinolone acetonide 0.1 % topical cream    2016       apply a thin layer

 to the affected area(s) by topical route 2 times per day     

 

                Cipro 500 mg oral tablet    1/15/2017       2017       take 1 tablet (500 mg) by oral route

 every 12 hours for 10 days              







Problem List







                    Description         Status              Onset

 

                    Artificial opening status; colostomy    Active               

 

                    Bipolar disorder, unspecified    Active               

 

                    Hyperlipidemia      Active               

 

                    Peritoneal Neoplasm, Malignant    Active               

 

                    Anemia, Pernicious    Active               

 

                    Arthritis unspecified    Active               

 

                    B12 deficiency      Active               







Vital Signs







      Date    Time    BP-Sys(mm[Hg]    BP-Lynn(mm[Hg])    HR(bpm)    RR(rpm)    Temp    WT    HT    HC    BMI

                    BSA                 BMI Percentile      O2 Sat(%)

 

        2017    1:34:00 PM    118 mmHg    62 mmHg    122 bpm    18 rpm    97.8 F    161.375 lbs    

69 in                     23.83 kg/m2    1.89 m2                   96 %

 

        2017    3:05:00 PM    134 mmHg    70 mmHg    70 bpm    20 rpm    97.4 F    181 lbs    69 in

                          26.7288 kg/m    1.9992 m                 98 %

 

        2017    11:07:00 AM    124 mmHg    64 mmHg    62 bpm    17 rpm    98.2 F    181.2 lbs    69

 in                       26.76 kg/m2    2.00 m2                   98 %

 

        1/15/2017    3:34:00 PM    148 mmHg    89 mmHg    69 bpm    20 rpm    98.2 F    179 lbs    69 in

                          26.4334 kg/m    1.9882 m                 98 %

 

       2017    1:51:00 PM    160 mmHg    90 mmHg    100 bpm    20 rpm    96.5 F    179 lbs             

                                                                98 %

 

       2016    3:11:00 PM    134 mmHg    76 mmHg    80 bpm    20 rpm    98 F    163 lbs    69 in     

                24.0706 kg/m    1.8972 m                      98 %

 

        2016    2:04:00 PM    142 mmHg    86 mmHg    68 bpm    16 rpm    98.5 F    166 lbs    63 in

                          29.41 kg/m2    1.83 m2                   100 %

 

        2016    11:27:00 AM    148 mmHg    78 mmHg    90 bpm    20 rpm    98.2 F    153 lbs    69 in

                          22.5939 kg/m    1.8381 m                 96 %

 

        12/3/2015    9:50:00 AM    132 mmHg    70 mmHg    62 bpm    16 rpm    97.9 F    145 lbs    69 in

                          21.41 kg/m2    1.79 m2                   100 %

 

        2015    8:52:00 AM    132 mmHg    68 mmHg    52 bpm    20 rpm    97.8 F    141 lbs    69 in

                          20.8218 kg/m    1.7645 m                 100 %

 

        2015    3:25:00 PM    120 mmHg    62 mmHg    72 bpm    16 rpm    98.1 F    136 lbs    69 in

                          20.08 kg/m2    1.73 m2                   98 %

 

       3/23/2015    2:55:00 PM    130 mmHg    76 mmHg    68 bpm    18 rpm    97 F    140 lbs    69 in    

                20.6742 kg/m    1.7583 m                      98 %

 

        10/16/2014    11:11:00 AM    120 mmHg    66 mmHg    77 bpm    20 rpm    98 F    130 lbs    69 in

                          19.20 kg/m2    1.69 m2                   100 %

 

        2014    3:21:00 PM    130 mmHg    66 mmHg    63 bpm    18 rpm    97.2 F    160 lbs    69 in

                          23.6276 kg/m    1.8797 m                 99 %

 

        2013    10:35:00 AM    132 mmHg    70 mmHg    66 bpm    20 rpm    98.1 F    157 lbs    69 in

                          23.18 kg/m2    1.86 m2                    

 

        2013    1:29:00 PM    132 mmHg    70 mmHg    76 bpm    18 rpm    98.2 F    166 lbs    69 in 

                          24.5137 kg/m    1.9146 m                  

 

       2013    2:46:00 PM    128 mmHg    70 mmHg    76 bpm    16 rpm    98 F    160 lbs    69 in     

                23.63 kg/m2     1.88 m2                          

 

        2011    8:49:00 AM    128 mmHg    78 mmHg    70 bpm    18 rpm    97.9 F    164 lbs    69 in

                          24.2183 kg/m    1.903 m                  

 

     2011    1:31:00 PM    132 mmHg    68 mmHg    84 bpm         97 F    167 lbs                        

                                         

 

        2011    9:09:00 AM    128 mmHg    70 mmHg    72 bpm    18 rpm    98.2 F    163 lbs    64 in 

                          27.9786 kg/m    1.8272 m                  

 

       2011    10:01:00 AM    132 mmHg    70 mmHg    72 bpm    18 rpm    98.2 F    154 lbs             

                                                                 

 

       2011    2:47:00 PM    128 mmHg    70 mmHg    72 bpm    18 rpm    97.8 F    156 lbs             

                                                                 

 

       5/10/2011    3:16:00 PM    144 mmHg    80 mmHg    72 bpm    18 rpm    98.2 F    158 lbs             

                                                                 

 

        2011    10:11:00 AM    132 mmHg    70 mmHg    70 bpm    18 rpm    98.2 F    168 lbs    69 in

                          24.809 kg/m    1.9261 m                  

 

        4/15/2011    10:52:00 AM    110 mmHg    60 mmHg    75 bpm    16 rpm    97.5 F    172.375 lbs    

69 in                     25.46 kg/m2    1.95 m2                   100 %

 

        2011    11:43:00 AM    120 mmHg    82 mmHg    75 bpm    16 rpm    97.2 F    178.5 lbs    69

 in                       26.3596 kg/m    1.9854 m                 100 %

 

        10/15/2010    1:32:00 PM    120 mmHg    70 mmHg    80 bpm    18 rpm    96.6 F    177 lbs    69 in

                          26.14 kg/m2    1.98 m2                   100 %

 

        2010    3:50:00 PM    168 mmHg    100 mmHg    82 bpm    18 rpm    97.8 F    177.5 lbs    69

 in                       26.2119 kg/m    1.9798 m                 97 %

 

        2010    1:21:00 PM    140 mmHg    80 mmHg    59 bpm    16 rpm    97.6 F    173.25 lbs    69 

in                        25.58 kg/m2    1.96 m2                   100 %

 

        2010    3:02:00 PM    140 mmHg    80 mmHg    61 bpm    16 rpm    97.6 F    173.125 lbs    69

 in                       25.5658 kg/m    1.9553 m                 99 %

 

        2010    1:23:00 PM    130 mmHg    80 mmHg    66 bpm    16 rpm    96.8 F    173 lbs    69 in 

                          25.55 kg/m2    1.95 m2                   100 %

 

        2010    12:58:00 PM    130 mmHg    88 mmHg    75 bpm    16 rpm    98.4 F    172.25 lbs    69

 in                       25.4366 kg/m    1.9503 m                 100 %







Social History







                    Name                Description         Comments

 

                    denies alcohol use                         

 

                    denies smoking                           

 

                    Denies illicit substance abuse                         

 

                    retired                                 direct care

 

                    Single                                   

 

                    Exercises regularly                         

 

                    Attended some college                         

 

                    Tobacco             Never smoker         







History of Procedures







                    Date Ordered        Description         Order Status

 

                    2010 12:00 AM    COMPREHEN METABOLIC PANEL    Reviewed

 

                    2010 12:00 AM    COMPLETE CBC W/AUTO DIFF WBC    Reviewed

 

                    2010 12:00 AM    LIPID PANEL         Reviewed

 

                          2015 12:00 AM        B12 Injection, Up to 1000 Mcg NDC#2985-4748-65 [facility] 

                                        Reviewed

 

                    2011 12:00 AM    MAMMOGRAM SCREENING    Reviewed

 

                    2011 12:00 AM    CYTOPATH C/V THIN LAYER    Reviewed

 

                    2011 12:00 AM    B12 Injection 1 cc NDC#[address]-34    Reviewed

 

                    2015 12:00 AM    THER/PROPH/DIAG INJ SC/IM    Reviewed

 

                    2015 12:00 AM    B12 Injection, Up to 1000 Mcg NDC#3309-1435-32    Reviewed

 

                    2011 12:00 AM    THER/PROPH/DIAG INJ SC/IM    Reviewed

 

                    2011 12:00 AM    B12 Injection([place]) Ndc#0331-1027-62-    Reviewed

 

                    2015 12:00 AM    THER/PROPH/DIAG INJ SC/IM    Reviewed

 

                    2015 12:00 AM    B12 Injection, Up to 1000 Mcg NDC#2627-3079-91    Reviewed

 

                    10/20/2011 12:00 AM    THER/PROPH/DIAG INJ SC/IM    Reviewed

 

                    10/20/2011 12:00 AM    B12 Injection([place]) Ndc#3824-6078-17-    Reviewed

 

                    2016 12:00 AM    THER/PROPH/DIAG INJ SC/IM    Reviewed

 

                    2016 12:00 AM    B12 Injection, Up to 1000 Mcg NDC#2859-0807-71    Reviewed

 

                    3/14/2016 12:00 AM    VITAMIN B-12        Reviewed

 

                    3/15/2016 12:00 AM    THER/PROPH/DIAG INJ SC/IM    Reviewed

 

                    3/15/2016 12:00 AM    B12 Injection, Up to 1000 Mcg NDC#3950-0559-78    Reviewed

 

                    2011 12:00 AM    ***Immunization administration, Medicare flu    Reviewed

 

                    2011 12:00 AM    Fluzone ** MEDICARE Only **    Reviewed

 

                    2011 12:00 AM    THER/PROPH/DIAG INJ SC/IM    Reviewed

 

                    2011 12:00 AM    B12 Injection ([employer]) Ndc#7015-8130-01    Reviewed

 

                    2016 12:00 AM    B12 Injection, Up to 1000 Mcg NDC#1915-5579-46 [facility]    

Reviewed

 

                    2016 12:00 AM    TTE W/DOPPLER COMPLETE    Reviewed

 

                    2016 12:00 AM    EXTREMITY STUDY     Reviewed

 

                          2016 12:00 AM        B12 Injection, Up to 1000 Mcg NDC#3180-0167-39 [facility] 

                                        Reviewed

 

                    2016 12:00 AM    THER/PROPH/DIAG INJ SC/IM    Reviewed

 

                    2016 12:00 AM    B12 Injection, Up to 1000 Mcg NDC#4259-1062-48    Reviewed

 

                    2016 12:00 AM    THER/PROPH/DIAG INJ SC/IM    Reviewed

 

                    2012 12:00 AM    B12 Injection([place]) Ndc#2188-3112-68-    Reviewed

 

                    2016 12:00 AM    B12 Injection, Up to 1000 Mcg NDC#9679-0240-49    Reviewed

 

                    2016 12:00 AM    THER/PROPH/DIAG INJ SC/IM    Reviewed

 

                    2012 12:00 AM    THER/PROPH/DIAG INJ SC/IM    Reviewed

 

                    2012 12:00 AM    B12 Injection ([employer]) Ndc#7033-3449-39    Reviewed

 

                    2016 12:00 AM    THER/PROPH/DIAG INJ SC/IM    Reviewed

 

                    2016 12:00 AM    B12 Injection, Up to 1000 Mcg NDC#4065-7978-41    Reviewed

 

                    2016 12:00 AM    B12 Injection, Up to 1000 Mcg NDC#2819-6490-21    Reviewed

 

                    2016 12:00 AM    THER/PROPH/DIAG INJ SC/IM    Reviewed

 

                    2012 12:00 AM    THER/PROPH/DIAG INJ SC/IM    Reviewed

 

                    2012 12:00 AM    B12 Injection([place]) Ndc#9438-2132-00-    Reviewed

 

                    12/15/2016 12:00 AM    B12 Injection, Up to 1000 Mcg NDC#6783-1164-74    Reviewed

 

                    12/15/2016 12:00 AM    THER/PROPH/DIAG INJ SC/IM    Reviewed

 

                    2016 12:00 AM    URNLS DIP STICK/TABLET RGNT AUTO W/O MICROSCOPY    Reviewed

 

                    1/3/2017 12:00 AM    URNLS DIP STICK/TABLET RGNT AUTO W/O MICROSCOPY    Reviewed

 

                    2017 12:00 AM    URINE CULTURE/COLONY COUNT    Reviewed

 

                    2017 12:00 AM    Rocephin 1 gram Injection, [facility]    Reviewed

 

                    2017 12:00 AM    THERAPEUTIC PROPHYLACTIC/DX INJECTION SUBQ/IM    Reviewed

 

                    2017 12:00 AM    B12 1000mcg Injection, [facility]    Reviewed

 

                    5/3/2012 12:00 AM    THER/PROPH/DIAG INJ SC/IM    Reviewed

 

                    5/3/2012 12:00 AM    B12 Injection([facility]) Ndc#4442-0781-65-    Reviewed

 

                    2017 12:00 AM    THERAPEUTIC PROPHYLACTIC/DX INJECTION SUBQ/IM    Reviewed

 

                    2017 12:00 AM    B12 1000mcg Injection, [facility]    Reviewed

 

                    2017 12:00 AM    MRI BRAIN STEM W/O & W/DYE    Reviewed

 

                    2017 12:00 AM    VITAMIN B-12        Reviewed

 

                    2017 12:00 AM    Speech Therapy Consult    Reviewed

 

                    2017 12:00 AM    ASSAY OF CREATININE    Reviewed

 

                    2012 12:00 AM    IMMUNOTHERAPY INJECTIONS    Reviewed

 

                    2012 12:00 AM    B12 Injection([facility]) Ndc#4286-9012-29-    Reviewed

 

                    2017 12:00 AM    URINALYSIS AUTO W/SCOPE    Reviewed

 

                    2012 12:00 AM    THER/PROPH/DIAG INJ SC/IM    Reviewed

 

                    2012 12:00 AM    B12 Injection, Up to 1000 Mcg NDC#9579-4320-34    Reviewed

 

                    2017 12:00 AM    URINALYSIS AUTO W/SCOPE    Reviewed

 

                    2017 2:18 PM    URINALYSIS AUTO W/O SCOPE    Reviewed

 

                    2017 12:00 AM    URINE CULTURE/COLONY COUNT    Reviewed

 

                    2017 12:00 AM    B12 1000mcg Injection    Reviewed

 

                    2017 12:00 AM    URNLS DIP STICK/TABLET RGNT AUTO W/O MICROSCOPY    Reviewed

 

                    2017 12:00 AM    METABOLIC PANEL TOTAL CA    Reviewed

 

                    2012 12:00 AM    THER/PROPH/DIAG INJ SC/IM    Reviewed

 

                    2012 12:00 AM    B12 Injection, Up to 1000 Mcg NDC#9084-1485-05    Reviewed

 

                    2012 12:00 AM    THER/PROPH/DIAG INJ SC/IM    Reviewed

 

                    2012 12:00 AM    B12 Injection, Up to 1000 Mcg NDC#5738-4074-68    Reviewed

 

                    10/16/2012 12:00 AM    THER/PROPH/DIAG INJ SC/IM    Reviewed

 

                    10/16/2012 12:00 AM    B12 Injection, Up to 1000 Mcg NDC#9695-6548-27    Reviewed

 

                    2010 12:00 AM    COMPREHEN METABOLIC PANEL    Reviewed

 

                    2010 12:00 AM    COMPLETE CBC W/AUTO DIFF WBC    Reviewed

 

                    2010 12:00 AM    LIPID PANEL         Reviewed

 

                    2013 12:00 AM    Flu Injection 3 Years And Above NDC# [address]-13  [facility]    Reviewed



 

                    2013 12:00 AM    COMPLETE CBC W/AUTO DIFF WBC    Reviewed

 

                    2013 12:00 AM    ASSAY OF LITHIUM    Reviewed

 

                    2013 12:00 AM    METABOLIC PANEL TOTAL CA    Reviewed

 

                    4/3/2013 12:00 AM    THER/PROPH/DIAG INJ SC/IM    Reviewed

 

                    4/3/2013 12:00 AM    B12 Injection, Up to 1000 Mcg NDC#7011-0386-52    Reviewed

 

                    2013 12:00 AM    THER/PROPH/DIAG INJ SC/IM    Reviewed

 

                    2013 12:00 AM    B12 Injection, Up to 1000 Mcg NDC#3751-8825-06    Reviewed

 

                    2013 12:00 AM    THER/PROPH/DIAG INJ SC/IM    Reviewed

 

                    2013 12:00 AM    B12 Injection, Up to 1000 Mcg NDC#6110-2776-74    Reviewed

 

                    2013 12:00 AM    LIPID PANEL         Reviewed

 

                    2013 12:00 AM    VITAMIN D 25 HYDROXY    Reviewed

 

                    2013 12:00 AM    THER/PROPH/DIAG INJ SC/IM    Reviewed

 

                    2013 12:00 AM    B12 Injection, Up to 1000 Mcg NDC#2642-1774-07    Reviewed

 

                    2013 12:00 AM    THER/PROPH/DIAG INJ SC/IM    Reviewed

 

                    3/6/2014 12:00 AM    THER/PROPH/DIAG INJ SC/IM    Reviewed

 

                    2014 12:00 AM    THER/PROPH/DIAG INJ SC/IM    Reviewed

 

                    2014 12:00 AM    B12 Injection, Up to 1000 Mcg NDC#3099-5474-00    Reviewed

 

                    2010 12:00 AM    SKIN FUNGI CULTURE    Reviewed

 

                    10/9/2010 12:00 AM    COMPREHEN METABOLIC PANEL    Reviewed

 

                    10/9/2010 12:00 AM    LIPID PANEL         Reviewed

 

                    2010 12:00 AM    THER/PROPH/DIAG INJ SC/IM    Reviewed

 

                    2010 12:00 AM    B12 Injection Ndc#[address]-62 ([facility])    Reviewed

 

                    2010 12:00 AM    THER/PROPH/DIAG INJ SC/IM    Reviewed

 

                    2010 12:00 AM    Kenalog 40 Mg Im-Nd#[address]-38 ([facility])    Reviewed

 

                    10/15/2010 12:00 AM    FLU VACCINE 3 YRS & > IM    Reviewed

 

                    10/15/2010 12:00 AM    Admin.Of M/C Cov.Vaccine-Flu Vacc.    Reviewed

 

                    1/15/2011 12:00 AM    COMPLETE CBC W/AUTO DIFF WBC    Reviewed

 

                    1/15/2011 12:00 AM    COMPREHEN METABOLIC PANEL    Reviewed

 

                    1/15/2011 12:00 AM    LIPID PANEL         Reviewed

 

                    2014 12:00 AM    MAMMOGRAM SCREENING    Reviewed

 

                    2014 12:00 AM    Screening mammography, bilateral    Reviewed

 

                    7/10/2014 12:00 AM    THER/PROPH/DIAG INJ SC/IM    Reviewed

 

                    7/10/2014 12:00 AM    B12 Injection, Up to 1000 Mcg NDC#6231-3620-69    Reviewed

 

                    2011 12:00 AM    COMPLETE CBC W/AUTO DIFF WBC    Reviewed

 

                    2011 12:00 AM    COMPREHEN METABOLIC PANEL    Reviewed

 

                    2011 12:00 AM    LIPID PANEL         Reviewed

 

                    2014 12:00 AM    B12 Injection, Up to 1000 Mcg NDC#5080-7604-01    Reviewed

 

                    10/19/2014 12:00 AM    MAMMOGRAM SCREENING    Reviewed

 

                    10/19/2014 12:00 AM    Screening mammography, bilateral    Reviewed

 

                    10/16/2014 12:00 AM    B12 Injection, Up to 1000 Mcg NDC#8210-0374-59    Reviewed

 

                    10/16/2014 12:00 AM    COMPLETE CBC W/AUTO DIFF WBC    Reviewed

 

                    10/16/2014 12:00 AM    COMPREHEN METABOLIC PANEL    Reviewed

 

                    10/16/2014 12:00 AM    IMMUNOASSAY TUMOR     Reviewed

 

                    10/16/2014 12:00 AM    LIPID PANEL         Reviewed

 

                    10/16/2014 12:00 AM    ASSAY OF LITHIUM    Reviewed

 

                    10/16/2014 12:00 AM    MAMMOGRAM SCREENING    Reviewed

 

                    2011 12:00 AM    ASSAY OF PARATHORMONE    Reviewed

 

                    2011 12:00 AM    VITAMIN D 25 HYDROXY    Reviewed

 

                    2011 12:00 AM    ASSAY OF LITHIUM    Reviewed

 

                    2011 12:00 AM    METABOLIC PANEL TOTAL CA    Reviewed

 

                    2011 12:00 AM    CT HEAD/BRAIN W/O & W/DYE    Reviewed

 

                    3/23/2015 12:00 AM    PNEUMOCOCCAL VACC 13 GLENDY IM    Reviewed

 

                    3/23/2015 12:00 AM    Vitamin B12 injection    Reviewed

 

                    2011 12:00 AM    ASSAY OF LITHIUM    Reviewed

 

                    2011 12:00 AM    B12 Injection Ndc#[address]-23  [facility]    Reviewed

 

                    2015 12:00 AM    THER/PROPH/DIAG INJ SC/IM    Reviewed

 

                    2015 12:00 AM    B12 Injection, Up to 1000 Mcg NDC#6889-7338-52    Reviewed

 

                    2015 12:00 AM    COMPLETE CBC W/AUTO DIFF WBC    Reviewed

 

                    2015 12:00 AM    COMPREHEN METABOLIC PANEL    Reviewed

 

                    2015 12:00 AM    LIPID PANEL         Reviewed

 

                    2015 12:00 AM    ASSAY OF LITHIUM    Reviewed

 

                    2011 12:00 AM    VIT D 1 25-DIHYDROXY    Reviewed

 

                    2011 12:00 AM    VITAMIN B-12        Reviewed

 

                    2015 12:00 AM    B12 Injection, Up to 1000 Mcg NDC#2471-0140-72    Reviewed

 

                    2015 12:00 AM    THER/PROPH/DIAG INJ SC/IM    Reviewed

 

                    2015 12:00 AM    B12 Injection, Up to 1000 Mcg NDC#9503-6884-65    Reviewed

 

                    2011 12:00 AM    THER/PROPH/DIAG INJ SC/IM    Reviewed

 

                    2011 12:00 AM    B12 Injection ([facility]) Ndc#6776-8055-23    Reviewed

 

                    2015 12:00 AM    THER/PROPH/DIAG INJ SC/IM    Reviewed

 

                    2015 12:00 AM    B12 Injection, Up to 1000 Mcg NDC#1899-5139-00    Reviewed







Results Summary







                          Date and Description      Results

 

                          2004 12:00 AM        Colonoscopy-Women and Men over 50 Normal 

 

                          2008 12:00 AM         Pap Smear Declined 

 

                          10/7/2009 12:00 AM        Cholest Cry Stone Ql .0 %LDLc SerPl-mCnc 123.0 mg/dLHDLc

 SerPl-mCnc 34.0 mg/dLTrigl SerPl-mCnc 190.0 mg/dLGlucose SerPl-mCnc 78.0 mg/dL

 

                          2009 12:00 AM        Mammogram -Women over 40 Normal HIV1+2 Ab Ser Ql no risk 

 

                          2010 8:47 AM         Dexa Bone Scan Refused Aspirin reccommended Contraindication 



 

                          2010 8:48 AM         Depression Done 

 

                          2010 12:00 AM         Foot Exam-Diabetic Done 

 

                          2010 12:00 AM         Cholest Cry Stone Ql .0 %LDLc SerPl-mCnc 126.0 mg/dLGlucose

 SerPl-mCnc 102.0 mg/dL

 

                          2010 8:45 AM          TRIGLYCERIDES 122.0 mg/dLCHOLESTEROL 186.0 mg/dLHDL 36.0 mg/dLTOT

 CHOL/HDL 5.2 LDL (CALC) 126.0 mg/dLGLUCOSE 102.0 mg/dLSODIUM 143.0 
mmol/LPOTASSIUM 3.70 mmol/LCHLORIDE 111.0 mmol/LCO2 23.0 mmol/LBUN 10.0 
mg/dLCREATININE 0.80 mg/dLSGOT/AST 12.0 IU/LSGPT/ALT 11.0 IU/LALK PHOS 65.0 
IU/LTOTAL PROTEIN 7.20 g/dLALBUMIN 3.90 g/dLTOTAL BILI 0.50 mg/dLCALCIUM 10.20 
mg/dLAGE 59 GFR NonAA 73 GFR AA 88 eGFR >60 mL/min/1.73 m2eGFR AA* >60 WBC 5.7 
RBC 3.26 HGB 10.60 g/dLHCT 31.70 %MCV 97.0 fLMCH 32.50 pgMCHC 33.40 g/dLRDW SD 
47 RDW CV 13.30 %MPV 9.70 fLPLT 287 NRBC# 0.00 NRBC% 0.0 %NEUT 62.90 %%LYMP 
21.80 %%MONO 9.90 %%EOS 5.0 %%BASO 0.40 %#NEUT 3.56 #LYMP 1.23 #MONO 0.56 #EOS 
0.28 #BASO 0.02 MANUAL DIFF NOT IND 

 

                          2010 12:00 AM        Glucose SerPl-mCnc 96.0 mg/dLCholest Cry Stone Ql .0 %LDLc

 SerPl-mCnc 146.0 mg/dL

 

                          2010 8:26 AM         TRIGLYCERIDES 106.0 mg/dLCHOLESTEROL 199.0 mg/dLHDL 32.0 mg/dLTOT

 CHOL/HDL 6.2 LDL (CALC) 146.0 mg/dLGLUCOSE 96.0 mg/dLSODIUM 143.0 
mmol/LPOTASSIUM 4.0 mmol/LCHLORIDE 113.0 mmol/LCO2 24.0 mmol/LBUN 13.0 
mg/dLCREATININE 1.0 mg/dLSGOT/AST 11.0 IU/LSGPT/ALT 6.0 IU/LALK PHOS 56.0 
IU/LTOTAL PROTEIN 6.60 g/dLALBUMIN 3.80 g/dLTOTAL BILI 0.50 mg/dLCALCIUM 9.30 
mg/dLAGE 59 GFR NonAA 57 GFR AA 69 eGFR 57 eGFR AA* >60 

 

                          10/6/2010 12:00 AM        Cholest Cry Stone Ql .0 %LDLc SerPl-mCnc 111.0 mg/dLGlucose

 SerPl-mCnc 81.0 mg/dL

 

                          10/6/2010 2:45 PM         TRIGLYCERIDES 123.0 mg/dLCHOLESTEROL 178.0 mg/dLHDL 42.0 mg/dLTOT

 CHOL/HDL 4.2 LDL (CALC) 111.0 mg/dLGLUCOSE 81.0 mg/dLSODIUM 139.0 
mmol/LPOTASSIUM 4.10 mmol/LCHLORIDE 106.0 mmol/LCO2 24.0 mmol/LBUN 13.0 
mg/dLCREATININE 0.90 mg/dLSGOT/AST 13.0 IU/LSGPT/ALT 11.0 IU/LALK PHOS 61.0 
IU/LTOTAL PROTEIN 7.10 g/dLALBUMIN 3.90 g/dLTOTAL BILI 0.30 mg/dLCALCIUM 9.30 
mg/dLAGE 60 GFR NonAA 64 GFR AA 78 eGFR >60 mL/min/1.73 m2eGFR AA* >60 WBC 6.9 
RBC 3.59 HGB 11.50 g/dLHCT 35.30 %MCV 98.0 fLMCH 32.0 pgMCHC 32.60 g/dLRDW SD 46
 RDW CV 12.90 %MPV 9.90 fLPLT 311 NRBC# 0.00 NRBC% 0.0 %NEUT 64.90 %%LYMP 22.50 
%%MONO 7.20 %%EOS 5.10 %%BASO 0.30 %#NEUT 4.45 #LYMP 1.54 #MONO 0.49 #EOS 0.35 
#BASO 0.02 MANUAL DIFF NOT IND 

 

                          2011 12:00 AM         Mammogram -Women over 40 Ordered 

 

                          2011 10:25 AM        TRIGLYCERIDES 111.0 mg/dLCHOLESTEROL 195.0 mg/dLHDL 43.0 mg/dLTOT

 CHOL/HDL 4.5 LDL (CALC) 130.0 mg/dLWBC 5.3 RBC 3.76 HGB 12.0 g/dLHCT 37.80 %MCV
 101.0 fLMCH 31.90 pgMCHC 31.70 g/dLRDW SD 47 RDW CV 13.0 %MPV 9.70 fLPLT 259 
NRBC# 0.00 NRBC% 0.0 %NEUT 69.0 %%LYMP 17.60 %%MONO 8.30 %%EOS 4.70 %%BASO 0.40 
%#NEUT 3.63 #LYMP 0.93 #MONO 0.44 #EOS 0.25 #BASO 0.02 MANUAL DIFF NOT IND 
GLUCOSE 102.0 mg/dLSODIUM 146.0 mmol/LPOTASSIUM 4.20 mmol/LCHLORIDE 113.0 
mmol/LCO2 23.0 mmol/LBUN 15.0 mg/dLCREATININE 1.0 mg/dLSGOT/AST 12.0 
IU/LSGPT/ALT 17.0 IU/LALK PHOS 60.0 IU/LTOTAL PROTEIN 6.90 g/dLALBUMIN 4.20 
g/dLTOTAL BILI 0.40 mg/dLCALCIUM 9.70 mg/dLAGE 60 GFR NonAA 57 GFR AA 69 eGFR 57
 eGFR AA* >60 

 

                          2011 11:49 AM        Cholest Cry Stone Ql .0 %LDLc SerPl-mCnc 130.0 mg/dLHDLc

 SerPl-mCnc 43.0 mg/dLTrigl SerPl-mCnc 111.0 mg/dLGlucose SerPl-mCnc 102.0 mg/dL

 

                          2011 11:52 AM        Pap Smear Declined 

 

                          2011 11:28 AM        Lithium 2.080 mmol/LGLUCOSE 102.0 mg/dLSODIUM 135.0 mmol/LPOTASSIUM

 3.90 mmol/LCHLORIDE 106.0 mmol/LCO2 21.0 mmol/LBUN 12.0 mg/dLCREATININE 1.30 
mg/dLCALCIUM 10.70 mg/dLAGE 60 GFR NonAA 42 GFR AA 51 eGFR 42 eGFR AA* 51 

 

                          2011 8:58 AM          Lithium 0.690 mmol/L

 

                          2011 2:38 PM         VITAMIN B12 3483.0 pg/mL

 

                          2013 3:35 PM          WBC 5.1 RBC 3.73 HGB 11.70 g/dLHCT 36.40 %MCV 98.0 fLMCH 31.40

 pgMCHC 32.10 g/dLRDW SD 47 RDW CV 13.10 %MPV 9.80 fLPLT 224 NRBC# 0.00 NRBC% 
0.0 %NEUT 66.80 %%LYMP 19.10 %%MONO 9.0 %%EOS 4.90 %%BASO 0.20 %#NEUT 3.42 #LYMP
 0.98 #MONO 0.46 #EOS 0.25 #BASO 0.01 MANUAL DIFF NOT IND GLUCOSE 88.0 
mg/dLSODIUM 141.0 mmol/LPOTASSIUM 4.10 mmol/LCHLORIDE 110.0 mmol/LCO2 22.0 
mmol/LBUN 22.0 mg/dLCREATININE 1.10 mg/dLCALCIUM 9.80 mg/dLAGE 62 GFR NonAA 50 
GFR AA 61 eGFR 50 eGFR AA* 60 Lithium 0.760 mmol/L

 

                          2013 11:02 AM        TRIGLYCERIDES 106.0 mg/dLCHOLESTEROL 181.0 mg/dLHDL 46.0 mg/dLTOT

 CHOL/HDL 3.9 LDL (CALC) 114.0 mg/dLVITAMIN D 41.10 ng/mL

 

                          10/17/2014 10:10 AM       WBC 5.0 RBC 3.66 HGB 11.60 g/dLHCT 36.80 %.0 fLMCH 31.70

 pgMCHC 31.50 g/dLRDW SD 50 RDW CV 13.50 %MPV 10.10 fLPLT 209 NRBC# 0.00 NRBC% 
0.0 %NEUT 69.20 %%LYMP 21.0 %%MONO 6.40 %%EOS 3.20 %%BASO 0.20 %#NEUT 3.46 #LYMP
 1.05 #MONO 0.32 #EOS 0.16 #BASO 0.01 MANUAL DIFF NOT IND GLUCOSE 100.0 
mg/dLSODIUM 148.0 mmol/LPOTASSIUM 3.90 mmol/LCHLORIDE 114.0 mmol/LCO2 26.0 
mmol/LBUN 12.0 mg/dLCREATININE 1.20 mg/dLSGOT/AST 9.0 IU/LSGPT/ALT <6 IU/LALK 
PHOS 82.0 IU/LTOTAL PROTEIN 6.90 g/dLALBUMIN 4.0 g/dLTOTAL BILI 0.40 
mg/dLCALCIUM 10.50 mg/dLAGE 64 GFR NonAA 45 GFR AA 55 eGFR 45 eGFR AA* 55 
TRIGLYCERIDES 96.0 mg/dLCHOLESTEROL 155.0 mg/dLHDL 38.0 mg/dLTOT CHOL/HDL 4.1 
LDL (CALC) 98.0 mg/dLLithium 0.850 mmol/LCancer Antigen (CA) 125 8.30 U/mL

 

                          2015 10:25 AM        Lithium 0.790 mmol/LWBC 4.8 RBC 3.44 HGB 11.0 g/dLHCT 35.20 

%.0 fLH 32.0 pgHC 31.30 g/dLRDW SD 53 RDW CV 14.0 %MPV 9.30 fLPLT 210
 NRBC# 0.00 NRBC% 0.0 %NEUT 70.80 %%LYMP 17.20 %%MONO 8.10 %%EOS 3.50 %%BASO 
0.40 %#NEUT 3.41 #LYMP 0.83 #MONO 0.39 #EOS 0.17 #BASO 0.02 MANUAL DIFF NOT IND 
TRIGLYCERIDES 107.0 mg/dLCHOLESTEROL 174.0 mg/dLHDL 43.0 mg/dLTOT CHOL/HDL 4.0 
LDL (CALC) 110.0 mg/dLGLUCOSE 90.0 mg/dLSODIUM 145.0 mmol/LPOTASSIUM 3.80 
mmol/LCHLORIDE 115.0 mmol/LCO2 24.0 mmol/LBUN 17.0 mg/dLCREATININE 1.30 
mg/dLSGOT/AST 18.0 IU/LSGPT/ALT 17.0 IU/LALK PHOS 56.0 IU/LTOTAL PROTEIN 6.70 
g/dLALBUMIN 3.90 g/dLTOTAL BILI 0.40 mg/dLCALCIUM 9.80 mg/dLAGE 64 GFR NonAA 41 
GFR AA 50 eGFR 41 eGFR AA* 50 

 

                          2015 8:50 AM        WBC 5.8 RBC 3.29 HGB 10.70 g/dLHCT 34.0 %.0 fLMCH 32.50

 pgMCHC 31.50 g/dLRDW SD 52 RDW CV 13.60 %MPV 9.60 fLPLT 223 NRBC# 0.00 NRBC% 
0.0 %NEUT 69.60 %%LYMP 18.90 %%MONO 8.50 %%EOS 2.80 %%BASO 0.20 %#NEUT 4.03 
#LYMP 1.09 #MONO 0.49 #EOS 0.16 #BASO 0.01 MANUAL DIFF NOT IND Lithium 0.620 
mmol/LGLUCOSE 83.0 mg/dLSODIUM 139.0 mmol/LPOTASSIUM 3.90 mmol/LCHLORIDE 109.0 
mmol/LCO2 22.0 mmol/LBUN 19.0 mg/dLCREATININE 1.40 mg/dLSGOT/AST 19.0 
IU/LSGPT/ALT 21.0 IU/LALK PHOS 55.0 IU/LTOTAL PROTEIN 6.50 g/dLALBUMIN 3.90 
g/dLTOTAL BILI 0.50 mg/dLCALCIUM 9.60 mg/dLAGE 65 GFR NonAA 38 GFR AA 46 eGFR 38
 eGFR AA* 46 TRIGLYCERIDES 121.0 mg/dLCHOLESTEROL 192.0 mg/dLHDL 51.0 mg/dLTOT 
CHOL/HDL 3.8 .0 mg/dLFREE T4 0.79 TSH 1.210 uIU/mLHemoglobin A1c 5.40 
%Estim. Avg Glu (eAG) 108 

 

                          3/15/2016 8:08 AM         VITAMIN B12 696.0 pg/mL

 

                          3/23/2016 8:26 AM         WBC 7.0 RBC 3.61 HGB 11.80 g/dLHCT 37.70 %.0 fLMCH 32.70

 pgMCHC 31.30 g/dLRDW SD 49 RDW CV 12.50 %MPV 10.0 fLPLT 207 NRBC# 0.00 NRBC% 
0.0 %NEUT 73.60 %%LYMP 16.40 %%MONO 6.60 %%EOS 3.0 %%BASO 0.30 %#NEUT 5.15 #LYMP
 1.15 #MONO 0.46 #EOS 0.21 #BASO 0.02 MANUAL DIFF NOT IND Lithium 0.940 
mmol/LGLUCOSE 108.0 mg/dLSODIUM 143.0 mmol/LPOTASSIUM 4.30 mmol/LCHLORIDE 110.0 
mmol/LCO2 27.0 mmol/LBUN 16.0 mg/dLCREATININE 1.60 mg/dLSGOT/AST 13.0 
IU/LSGPT/ALT 7.0 IU/LALK PHOS 71.0 IU/LTOTAL PROTEIN 6.80 g/dLALBUMIN 4.0 
g/dLTOTAL BILI 0.20 mg/dLCALCIUM 10.40 mg/dLAGE 65 GFR NonAA 32 GFR AA 39 eGFR 
32 eGFR AA* 39 TRIGLYCERIDES 113.0 mg/dLCHOLESTEROL 169.0 mg/dLHDL 42.0 mg/dLTOT
 CHOL/HDL 4.0 LDL (CALC) 104.0 mg/dLFREE T4 0.86 TSH 2.20 uIU/mLHemoglobin A1c 
5.20 %Estim. Avg Glu (eAG) 103 

 

                          3/25/2016 9:17 AM         COLOR YELLOW APPEARANCE CLEAR SPEC GRAV 1.010 pH 7.0 PROTEIN 

NEGATIVE GLUCOSE NEGATIVE mg/dLKETONE NEGATIVE BILIRUBIN NEGATIVE BLOOD NEGATIVE
 NITRITE NEGATIVE [place] SCREEN SMALL MICRO IND? SEE BELOW WBC/HPF 0-5 RBC/HPF 
NEGATIVE CASTS/LPF NEGATIVE /LPFCRYSTALS NEGATIVE MUCOUS THRDS NEGATIVE BACTERIA
 NEGATIVE EPITH CELLS FEW SQUAMOUS /HPFTRICHOMONAS NEGATIVE YEAST NEGATIVE 

 

                          2016 6:00 AM        GLUCOSE 91.0 mg/dLSODIUM 143.0 mmol/LPOTASSIUM 3.60 mmol/LCHLORIDE

 112.0 mmol/LCO2 23.0 mmol/LBUN 22.0 mg/dLCREATININE 1.20 mg/dLSGOT/AST 15.0 
IU/LSGPT/ALT 12.0 IU/LALK PHOS 61.0 IU/LTOTAL PROTEIN 5.40 g/dLALBUMIN 3.10 
g/dLTOTAL BILI 0.40 mg/dLCALCIUM 8.40 mg/dLAGE 66 GFR NonAA 45 GFR AA 55 eGFR 45
 eGFR AA* 55 WBC 3.0 RBC 3.05 HGB 9.80 g/dLHCT 32.10 %.0 fLMCH 32.10 
pgMCHC 30.50 g/dLRDW SD 54 RDW CV 14.20 %MPV 10.10 fLPLT 170 NRBC# 0.00 NRBC% 
0.0 %NEUT 50.70 %%LYMP 32.60 %%MONO 10.50 %%EOS 5.90 %%BASO 0.30 %#NEUT 1.54 
#LYMP 0.99 #MONO 0.32 #EOS 0.18 #BASO 0.01 MANUAL DIFF NOT IND 

 

                          2016 2:09 PM        COLOR YELLOW APPEARANCE CLEAR SPEC GRAV 1.010 pH 5.0 PROTEIN

 30 GLUCOSE NEGATIVE mg/dLKETONE NEGATIVE BILIRUBIN NEGATIVE BLOOD LARGE NITRITE
 NEGATIVE [place] SCREEN MODERATE MICRO INDICATED? SEE BELOW WBC/HPF  RBC/HPF
 20-50 CASTS/LPF NEGATIVE /LPFCRYSTALS NEGATIVE MUCOUS THRDS NEGATIVE BACTERIA 
NEGATIVE EPITH CELLS FEW SQUAMOUS /HPFTRICHOMONAS NEGATIVE YEAST NEGATIVE CULT 
SET UP? YES 

 

                          1/3/2017 4:08 PM          COLOR YELLOW APPEARANCE HAZY SPEC GRAV 1.015 pH 6.0 PROTEIN 30

 GLUCOSE NEGATIVE mg/dLKETONE NEGATIVE BILIRUBIN NEGATIVE BLOOD MODERATE NITRITE
 NEGATIVE [place] SCREEN LARGE MICRO INDICATED? SEE BELOW WBC/-200 RBC/HPF 
5-10 CASTS/LPF NEGATIVE /LPFCRYSTALS NEGATIVE MUCOUS THRDS NEGATIVE BACTERIA 
NEGATIVE EPITH CELLS 1+ SQUAMOUS /HPFTRICHOMONAS NEGATIVE YEAST NEGATIVE CULT 
SET UP? YES 

 

                          2017 4:24 PM         CREATININE 1.50 mg/dLAGE 66 GFR NonAA 35 GFR AA 42 eGFR 35 eGFR

 AA* 42 VITAMIN B12 1324.0 pg/mL

 

                          2017 11:30 AM         GLUCOSE 93.0 mg/dLSODIUM 143.0 mmol/LPOTASSIUM 3.10 mmol/LCHLORIDE

 101.0 mmol/LCO2 29.0 mmol/LBUN 26.0 mg/dLCREATININE 1.50 mg/dLSGOT/AST 23.0 
IU/LSGPT/ALT 13.0 IU/LALK PHOS 66.0 IU/LTOTAL PROTEIN 7.70 g/dLALBUMIN 4.30 
g/dLTOTAL BILI 0.40 mg/dLCALCIUM 10.30 mg/dLAGE 66 GFR NonAA 35 GFR AA 42 eGFR 
35 eGFR AA* 42 TRIGLYCERIDES 147.0 mg/dLCHOLESTEROL 184.0 mg/dLHDL 44.0 mg/dLTOT
 CHOL/HDL 4.2 .0 mg/dLWBC 5.4 RBC 3.98 HGB 12.90 g/dLHCT 40.20 %.0
 fLMCH 32.40 pgMCHC 32.10 g/dLRDW SD 50 RDW CV 13.50 %MPV 9.30 fLPLT 210 NRBC# 
0.00 NRBC% 0.0 %NEUT 54.20 %%LYMP 30.70 %%MONO 9.10 %%EOS 5.20 %%BASO 0.40 
%#NEUT 2.94 #LYMP 1.66 #MONO 0.49 #EOS 0.28 #BASO 0.02 MANUAL DIFF NOT IND FREE 
T4 1.09 COLOR YELLOW APPEARANCE CLEAR SPEC GRAV <=1.005 pH 5.5 PROTEIN NEGATIVE 
GLUCOSE NEGATIVE mg/dLKETONE NEGATIVE BILIRUBIN NEGATIVE BLOOD NEGATIVE NITRITE 
NEGATIVE [place] SCREEN LARGE MICRO INDICATED? SEE BELOW WBC/HPF 10-20 RBC/HPF 0-5 
CASTS/LPF NEGATIVE /LPFCRYSTALS NEGATIVE MUCOUS THRDS NEGATIVE BACTERIA FEW 
EPITH CELLS 1+ SQUAMOUS /HPFTRICHOMONAS NEGATIVE YEAST NEGATIVE CULT SET UP? YES
 TSH 1.820 uIU/mL

 

                          2017 2:45 PM         COLOR YELLOW APPEARANCE CLEAR SPEC GRAV <=1.005 pH 6.0 PROTEIN

 NEGATIVE GLUCOSE NEGATIVE mg/dLKETONE NEGATIVE BILIRUBIN NEGATIVE BLOOD TRACE-
INTACT NITRITE NEGATIVE [place] SCREEN SMALL MICRO INDICATED? SEE BELOW WBC/HPF 0-5
 RBC/HPF NEGATIVE CASTS/LPF NEGATIVE /LPFCRYSTALS NEGATIVE MUCOUS THRDS NEGATIVE
 BACTERIA FEW EPITH CELLS FEW SQUAMOUS /HPFTRICHOMONAS NEGATIVE YEAST NEGATIVE 
CULT SET UP? YES 

 

                          2017 11:22 AM        COLOR YELLOW APPEARANCE CLEAR SPEC GRAV <=1.005 pH 6.5 PROTEIN

 NEGATIVE GLUCOSE NEGATIVE mg/dLKETONE NEGATIVE BILIRUBIN NEGATIVE BLOOD 
NEGATIVE NITRITE NEGATIVE LEUK SCREEN NEGATIVE MICRO INDICATED? NOT INDICATED 

 

                          2017 2:18 PM         Clarity Ur cloudy Color Ur dark yellow Glucose Ur-sCnc negative

 Bilirub Ur Ql Strip negative Ketones Ur Ql Strip negative Sp Gr Ur Qn 1.010 Hgb
 Ur Ql Strip trace-intact pH Ur-LsCnc 6.5 Prot Ur Ql Strip trace Urobilinogen 
Ur-mCnc 0.2 Nitrite Ur Ql Strip negative WBC Est Ur Ql Strip large 

 

                          2017 9:28 AM         GLUCOSE 109.0 mg/dLSODIUM 142.0 mmol/LPOTASSIUM 3.60 mmol/LCHLORIDE

 106.0 mmol/LCO2 23.0 mmol/LBUN 11.0 mg/dLCREATININE 1.30 mg/dLCALCIUM 9.80 
mg/dLAGE 66 GFR NonAA 41 GFR AA 50 eGFR 41 eGFR AA* 50 

 

                          2017 9:52 AM         COLOR YELLOW APPEARANCE CLOUDY SPEC GRAV <=1.005 pH 6.5 PROTEIN

 NEGATIVE GLUCOSE NEGATIVE mg/dLKETONE NEGATIVE BILIRUBIN NEGATIVE BLOOD SMALL 
NITRITE POSITIVE [place] SCREEN LARGE MICRO INDICATED? SEE BELOW WBC/-300 
RBC/HPF 5-10 CASTS/LPF NEGATIVE /LPFCRYSTALS NEGATIVE MUCOUS THRDS NEGATIVE 
BACTERIA 2++ EPITH CELLS 1+ SQUAMOUS /HPFTRICHOMONAS NEGATIVE YEAST NEGATIVE 
CULT SET UP? YES 







History Of Immunizations







       Name    Date Admin    Mfg Name    Mfg Code    Trade Name    Lot#    Route    Inj    Vis Given    Vis

 Pub                                    CVX

 

        Influenza    2008    Not Entered    NE      Not Entered            Not Entered    Not Entered

                    1            999

 

        X       12/19/2008    [employer]    MSD     Pneumovax 23            Intramuscular    Not Entered

                    1            999

 

           Influenza    10/15/2010    [employer]    NOV        Fluvirin > 12 Years    

[id number]     Intramuscular    Left Deltoid    10/15/2010    2009    999

 

          X         3/23/2015    [facility]    [facility]       Prevnar 13    [id number]    Intramuscular

                Right Gluteous Medius    3/23/2015       2013       109







History of Past Illness







                    Name                Date of Onset       Comments

 

                    Peritoneal Neoplasm, Malignant                         

 

                    Hyperlipidemia                           

 

                    Bipolar disorder, unspecified                         

 

                    Artificial opening status; colostomy                         

 

                    B12 deficiency                           

 

                    Anemia, Pernicious                         

 

                    Arthritis unspecified                         

 

                    cervical cancer                          

 

                    Artificial opening status; colostomy    2010  1:10PM     

 

                    Bipolar disorder, unspecified    2010  1:10PM     

 

                    Hyperlipidemia      2010  1:10PM     

 

                    Anemia, Pernicious    2010  1:10PM     

 

                    Postoperative Follow-Up    2010  1:55PM     

 

                    Postoperative Follow-Up    Mar  8 2010 10:57AM     

 

                    Artificial opening status; colostomy    Mar  8 2010  1:19PM     

 

                    Peritoneal Neoplasm, Malignant    Mar  8 2010  1:19PM     

 

                    Artificial opening status; colostomy    2010  1:40PM     

 

                    Hyperlipidemia      2010  1:40PM     

 

                    Anemia, Pernicious    2010  1:40PM     

 

                    Peritoneal Neoplasm, Malignant    2010  1:40PM     

 

                    Arthritis unspecified    2010  1:40PM     

 

                    Anemia of Chronic Illness    2010  1:40PM     

 

                    Tinea corporis      2010  3:17PM     

 

                    Bipolar disorder, unspecified    2010  1:33PM     

 

                    Hyperlipidemia      2010  1:33PM     

 

                    Anemia, Pernicious    2010  1:33PM     

 

                    Peritoneal Neoplasm, Malignant    2010  1:33PM     

 

                    B12 deficiency      2010  1:33PM     

 

                    Ethmoidal Sinusitis, Acute    Sep 21 2010  3:53PM     

 

                    Wheezing            Sep 21 2010  3:53PM     

 

                    Flu                 Oct 15 2010  1:40PM     

 

                    Bipolar disorder, unspecified    Oct 15 2010  1:42PM     

 

                    Hyperlipidemia      Oct 15 2010  1:42PM     

 

                    Anemia, Pernicious    Oct 15 2010  1:42PM     

 

                    Peritoneal Neoplasm, Malignant    Oct 15 2010  1:42PM     

 

                    Bipolar disorder, unspecified    2011 12:01PM     

 

                    Hyperlipidemia      2011 12:01PM     

 

                    Anemia, Pernicious    2011 12:01PM     

 

                    Peritoneal Neoplasm, Malignant    2011 12:01PM     

 

                    Bipolar disorder, unspecified    Apr 15 2011 10:55AM     

 

                    Major Depression    2011 10:11AM     

 

                    Bipolar Disorder    2011 10:11AM     

 

                    Cancer              May 10 2011  4:16PM     

 

                    Major Depression    May 10 2011  3:16PM     

 

                    Bipolar Disorder    May 10 2011  3:16PM     

 

                    Hypercalcemia       May 23 2011  2:47PM     

 

                    Bipolar disorder, unspecified    May 23 2011  2:47PM     

 

                    Colon Cancer, Personal History    May 23 2011  2:47PM     

 

                    Bipolar Disorder    May 31 2011  4:39PM     

 

                    Depressive Disorder    2011 10:01AM     

 

                    Vitamin B12 deficiency    2011 10:01AM     

 

                    Vitamin D Deficiency    2011  5:07PM     

 

                    Anemia, Vitamin B12 Deficiency    2011  5:07PM     

 

                    B12 deficiency      2011  3:56PM     

 

                    Routine gynecological examination    Aug  4 2011  9:08AM     

 

                    Screening Examination for Breast Cancer    Aug  4 2011  9:08AM     

 

                    Tinea Corporis      Aug  4 2011  9:08AM     

 

                    Depressive Disorder    Sep 23 2011  8:47AM     

 

                    Contact Dermatitis    Sep 23 2011  8:47AM     

 

                    Anemia, Pernicious    Sep 23 2011  8:47AM     

 

                    B12 deficiency      Sep 23 2011  8:47AM     

 

                    B12 deficiency      Sep 27 2011  2:58PM     

 

                    B12 deficiency      Oct 20 2011  2:34PM     

 

                    Flu                 Dec  9 2011  3:16PM     

 

                    B12 deficiency      Dec  9 2011  3:17PM     

 

                    B12 deficiency      2012  4:52PM     

 

                    B12 deficiency      Feb 2012 11:10AM     

 

                    B12 deficiency      2012  3:37PM     

 

                    B12 deficiency      May  3 2012  4:10PM     

 

                    B12 deficiency      2012  2:54PM     

 

                    B12 deficiency      2012 11:23AM     

 

                    B12 deficiency      Aug  9 2012  2:08PM     

 

                    B12 deficiency      Sep  6 2012  4:36PM     

 

                    B12 deficiency      Oct 16 2012 10:23AM     

 

                    Flu                 Feb  2013  3:11PM     

 

                    Bipolar disorder, unspecified    Feb  2013  2:48PM     

 

                    Anemia, Pernicious    Feb  2013  2:48PM     

 

                    B12 deficiency      Feb  2013  2:48PM     

 

                    Extrapyramidal abnormal movement disorder    Feb  2013  2:48PM     

 

                    B12 deficiency      Apr  3 2013 12:03PM     

 

                    Bipolar disorder, unspecified    May  7 2013  1:31PM     

 

                    Anemia, Pernicious    May  7 2013  1:31PM     

 

                    B12 deficiency      May  7 2013  1:31PM     

 

                    Extrapyramidal abnormal movement disorder    May  7 2013  1:31PM     

 

                    B12 deficiency      2013  3:42PM     

 

                    B12 deficiency      2013  1:31PM     

 

                    Hyperlipidemia      Aug  7 2013 10:37AM     

 

                    Vitamin D Deficiency    Aug  7 2013 10:37AM     

 

                    Bipolar disorder, unspecified    Aug  7 2013 10:37AM     

 

                    Anemia, Pernicious    Aug  7 2013 10:37AM     

 

                    B12 deficiency      Aug  7 2013 10:37AM     

 

                    B12 deficiency      Sep 25 2013 11:15AM     

 

                    B12 deficiency      Dec 11 2013  3:16PM     

 

                    B12 deficiency      Mar  6 2014  1:48PM     

 

                    B12 deficiency      May 21 2014  3:17PM     

 

                    Screening Examination for Breast Cancer    2014  3:23PM     

 

                    Periumbilical abdominal pain    2014  3:23PM     

 

                    B12 deficiency      Jul 10 2014  2:52PM     

 

                    Anemia, Vitamin B12 Deficiency    Aug 13 2014  4:50PM     

 

                    Bipolar disorder    Oct 16 2014 11:13AM     

 

                    Hyperlipidemia      Oct 16 2014 11:13AM     

 

                    Anemia, Pernicious    Oct 16 2014 11:13AM     

 

                    Peritoneal Neoplasm, Malignant    Oct 16 2014 11:13AM     

 

                    Screening breast examination    Oct 16 2014 11:13AM     

 

                    Weight loss         Oct 16 2014 11:13AM     

 

                    Anemia, Pernicious    Mar 23 2015  2:57PM     

 

                    B12 deficiency      Mar 23 2015  2:57PM     

 

                    Need for Prevnar vaccine    Mar 23 2015  2:57PM     

 

                    Bipolar disorder    Mar 23 2015  2:57PM     

 

                    Hyperlipidemia      Mar 23 2015  2:57PM     

 

                    Anemia, Pernicious    Mar 23 2015  2:57PM     

 

                    Peritoneal Neoplasm, Malignant    Mar 23 2015  2:57PM     

 

                    B12 deficiency      May  4 2015  4:48PM     

 

                    Hyperlipidemia      May 13 2015  9:56AM     

 

                    Anemia              May 13 2015  9:56AM     

 

                    Bipolar disorder    May 13 2015  9:56AM     

 

                    Bipolar disorder    May 14 2015  3:27PM     

 

                    Hyperlipidemia      May 14 2015  3:27PM     

 

                    Anemia, Pernicious    May 14 2015  3:27PM     

 

                    Peritoneal Neoplasm, Malignant    May 14 2015  3:27PM     

 

                    B12 deficiency      2015  2:20PM     

 

                    B12 deficiency      2015 11:34AM     

 

                    B12 deficiency      Aug 18 2015  9:06AM     

 

                    Tinea Corporis      Sep 18 2015  8:54AM     

 

                    B12 deficiency      Sep 18 2015  8:54AM     

 

                    B12 deficiency      2015 10:28AM     

 

                    Herpes zoster without complication    Dec  3 2015  9:52AM     

 

                    B12 deficiency      Dec 23 2015 11:21AM     

 

                    B12 deficiency      2016  4:51PM     

 

                    Vitamin B 12 deficiency    Mar 14 2016  5:35PM     

 

                    B12 deficiency      Mar 15 2016 12:14PM     

 

                    B12 deficiency      May  5 2016 11:30AM     

 

                    Edema               May  5 2016 11:30AM     

 

                    Dermatitis          May  5 2016 11:30AM     

 

                    Edema               May 17 2016  8:38AM     

 

                    Shortness of breath    May 17 2016  8:38AM     

 

                    Bilateral edema of lower extremity    2016  2:06PM     

 

                    B12 deficiency      2016  2:06PM     

 

                    B12 deficiency      2016 11:50AM     

 

                    B12 deficiency      2016 11:20AM     

 

                    Diarrhea            Aug  2 2016  3:13PM     

 

                    B12 deficiency      Aug 24 2016 11:10AM     

 

                    Encounter for screening mammogram for breast cancer    Aug 24 2016 11:44AM     

 

                    B12 deficiency      Sep 28 2016  2:35PM     

 

                    B12 deficiency      Dec 15 2016  2:02PM     

 

                    Dysuria             Dec 29 2016 12:14PM     

 

                    Hematuria           Fidencio  3 2017  1:33PM     

 

                    Constipation by delayed colonic transit    2017  1:52PM     

 

                    Ileus               2017  1:52PM     

 

                    UTI (urinary tract infection)    Fidencio 15 2017  3:39PM     

 

                    Acute cystitis with hematuria    2017 11:07AM     

 

                    B12 deficiency      2017 11:07AM     

 

                    B12 deficiency      2017 11:40AM     

 

                    B12 deficiency      2017  4:07PM     

 

                    Slurred speech      2017  3:07PM     

 

                    Vitamin B12 deficiency    2017  3:07PM     

 

                    Dysphagia, unspecified type    2017  3:07PM     

 

                    Hyperlipidemia      2017  3:07PM     

 

                    Dysuria             2017 12:01PM     

 

                    B12 deficiency      2017  2:08PM     

 

                    Dysuria             2017 10:58AM     

 

                    Hematuria           May 22 2017  1:36PM     

 

                    Depressive Disorder    May 22 2017  1:36PM     

 

                    Constipation        May 22 2017  1:36PM     

 

                    Fatigue             May 22 2017  1:36PM     

 

                    Urinary tract infection    May 30 2017  9:36AM     

 

                    Hypokalemia         May 30 2017 12:03PM     







Payers







           Insurance Name    Company Name    Plan Name    Plan Number    Policy Number    Policy Group

 Number                                 Start Date

 

                    Medicare C    Medicare C              [id number]              N/A

 

                          [employer]    [employer]                 [id number]

                                                    

 

                    Medicare Part A    Medicare - Lab/Xray              [id number]              2006

 

                    Medicare Part B    [employer] [place]              [id number]              2006

 

                          [employer]    [employer]                 50 percent

                                                    2009

 

                    Human    [employer]              [id number]              N/A

 

                    Medicare Part A    Medicare Part A              [id number]              N/A

 

                    Medicare Part A    Medicare Part A              [id number]              2006









History of Encounters







                    Visit Date          Visit Type          Provider

 

                    2017           Office visit        Bhupinder Louise DO

 

                    2017           Nurse visit         Bhupinder Louise DO

 

                    2017           Office visit         

 

                    2017           Office visit        Bhupinder Louise DO

 

                    2017           Nurse visit         Bhupinder Louise DO

 

                    2017           Office visit        Radha Ontiveros APRN

 

                    1/15/2017           Office visit        Aj Tapia NP

 

                    2017            Office visit        Devin Masterson MD

 

                    2016          [facility]            Devin Masterson MD

 

                    12/15/2016          Nurse visit         Bhupinder Louise DO

 

                    2016           Nurse visit         Bret Forte APRN

 

                    2016           Nurse visit         Bhupinder Louise DO

 

                    2016            Office visit        Bhupinder Louise DO

 

                    2016           Nurse visit         Bhupinder Louise DO

 

                    2016           Office visit        Bret Forte APRN

 

                    2016            Office visit        Bhupinder Louise DO

 

                    3/15/2016           Nurse visit         Bhupinder Louise DO

 

                    2016            Nurse visit         Bhupinder Louise DO

 

                    2015          Nurse visit         Bhupinder Louise DO

 

                    12/3/2015           Office visit        Bhupinder Aspen DO

 

                    2015          Nurse visit         Bhupinder Aspen DO

 

                    2015           Office visit        Bhupinder Aspen DO

 

                    2015           Nurse visit         Bhupinder Aspen DO

 

                    2015            Nurse visit         Bhupinder Aspen DO

 

                    2015            Nurse visit         Bhupinder Aspen DO

 

                    2015           Office visit        Bhupinder Aspen DO

 

                    2015            Nurse visit         Bhupinder Aspen DO

 

                    3/23/2015           Office visit        Bhupinder Aspen DO

 

                    10/16/2014          Office visit        Bhupinder Aspen DO

 

                    2014           Nurse visit         Radha Ontiveros APRN

 

                    7/10/2014           Nurse visit         Bhupinder Aspen DO

 

                    2014           Office visit        Bhupinder Aspen DO

 

                    2014           Nurse visit         Bhupinder Aspen DO

 

                    3/6/2014            Nurse visit         Bhupinder Aspen DO

 

                    2014            [facility]            EARNEST Lopez MD

 

                    2013          Nurse visit         Bhupinder Aspen DO

 

                    2013           Nurse visit         Bhupinder Aspen DO

 

                    2013            Office visit        Bhupinder Aspen DO

 

                    2013            Nurse visit         Bhupinder Aspen DO

 

                    2013            Nurse visit         Bhupinder Aspen DO

 

                    2013            Office visit        Bhupinder Aspen DO

 

                    4/3/2013            Nurse visit         Bhupinder Aspen DO

 

                    2013            Office visit        Bhupinder Aspen DO

 

                    10/16/2012          Nurse visit         Bhupinder Aspen DO

 

                    2012            Nurse visit         Bhupinder Aspen DO

 

                    2012            Voided              Bhupinder Aspen DO

 

                    2012            Nurse visit         Bhupinder Aspen DO

 

                    2012            Nurse visit         Bhupinder Aspen DO

 

                    2012           Nurse visit         Bhupinder Aspen DO

 

                    5/3/2012            Nurse visit         Bhupinder Aspen DO

 

                    2012           Nurse visit         Bhupinder Aspen DO

 

                    2012           Nurse visit         Bhupinder Aspen DO

 

                    2012           Nurse visit         Bhupinder Aspen DO

 

                    2011           Nurse visit         Bhupinder Aspen DO

 

                    10/20/2011          Nurse visit         Bhupinder Aspen DO

 

                    2011           Office visit        Bhupinder Aspen DO

 

                    2011           Nurse visit         Radha Ontiveros APRN

 

                    2011            Office visit        Bhupinder Aspen DO

 

                    2011           Nurse visit         Bhupinder Aspen DO

 

                    2011            Office visit        Bhupinder Aspen DO

 

                    2011           Office visit        Bhupinder Aspen DO

 

                    5/10/2011           Office visit        Bhupinder Aspen DO

 

                    2011           Office visit        Bhupinder Aspen DO

 

                    4/15/2011           Office visit        Devin Angel DO

 

                    2011           Office visit        Devin Angel DO

 

                    10/15/2010          Office visit        Devin Angel DO

 

                    2010           Office visit        Devin Angel DO

 

                    2010            Office visit        Devin Angel DO

 

                    2010           Office visit        Devin Angel DO

 

                    2010            Office visit        Devin Angel DO

 

                    3/8/2010            Office visit        Devin Masterson MD

 

                    2010            Surgery             Devin Masterson MD

 

                    2010            Office visit        Devin Angel DO

 

                    2010           Surgery             Devin Masterson MD

 

                    2010           Hospital            Devin Masterson MD

 

                    2010           [facility]            Devin Masterson MD

 

                    10/22/2009          Office visit        Devin Angel DO

## 2019-06-26 NOTE — XMS REPORT
MU2 Ambulatory Summary

                             Created on: 2017



Pauline Gan

External Reference #: [id number]

: 1950

Sex: Female



Demographics







                          Address                   [address]

[place]  [id number]

 

                          Home Phone                [phone]

 

                          Preferred Language        English

 

                          Marital Status            Legally 

 

                          [employer] Affiliation     Unknown

 

                          Race                      White

 

                          Ethnic Group              Not  or 





Author







                          Author                    Bhupinder Louise

 

                          [facility]

 

                          Address                   [address]

[place]  [id number]



 

                          Phone                     [phone]







Care Team Providers







                    Care Team Member Name    Role                Phone

 

                    Bhupinder Louise    PCP                 Unavailable

 

                    Bhupinder Louise    PreferredProvider    Unavailable







Allergies and Adverse Reactions







                    Name                Reaction            Notes

 

                    NO KNOWN DRUG ALLERGIES                         







Plan of Treatment







             Planned Activity    Comments     Planned Date    Planned Time    Plan/Goal

 

             Injection,Subcutaneous/Intramuscul, [facility]                 2017    12:00 AM      







Medications







                                        Active 

 

             Name         Start Date    Estimated Completion Date    SIG          Comments

 

                Latuda 20 mg oral tablet                                    take 1 tablet (20 mg) by oral route once daily with

 food (at least 350 calories)            

 

             pravastatin 40 mg oral tablet    3/30/2015                 TAKE 1 TABLET BY MOUTH DAILY     

 

                Namenda XR 28 mg oral capsule,sprinkle,ER 24hr    2015                       take 1 capsule (28

 mg) by oral route once daily            

 

                Namenda XR 28 mg oral capsule,sprinkle,ER 24hr    2016                       take 1 capsule (28

 mg) by oral route once daily            

 

                potassium chloride 10 mEq oral tablet extended release    2016                       take 1 tablet

 (10 meq) by oral route once daily       

 

             pravastatin 40 mg oral tablet    2016                 TAKE 1 TABLET BY MOUTH DAILY     

 

                Vitamin B-12 1,000 mcg/mL injection solution    2016                       inject 1 milliliter 

(1,000 mcg) by intramuscular route once a month     

 

                potassium chloride 10 mEq oral tablet extended release    2016                      take 1 tablet

 (10 meq) by oral route once daily       

 

                Namenda XR 28 mg oral capsule,sprinkle,ER 24hr    2016                      TAKE 1 CAPSULE BY

 MOUTH EVERY DAY                         

 

                furosemide 40 mg oral tablet    2016                      take 1 tablet (40 mg) by oral route

 once daily                              

 

                mirtazapine 45 mg oral tablet                                    take 1 tablet (45 mg) by oral route once daily

 before bedtime                          

 

             Fish Oil 300-1,000 mg oral capsule                              take 1 capsule by oral route daily     

 

             Vitamin D3 1,000 unit oral tablet                              take 1 tablet by oral route daily     

 

                Calcium 600 600 mg calcium (1,500 mg) oral tablet                                    take 1 tablet by oral route

 daily                                   

 

                Aspirin Low Dose 81 mg oral tablet,delayed release (DR/EC)                                    take 1 tablet 

(81 mg) by oral route once daily         

 

                metoclopramide HCl 10 mg oral tablet    2017                       take 1 tablet by oral route 

2 times a day for 50 days                









                                         

 

             Name         Start Date    Expiration Date    SIG          Comments

 

             Reglan 10mg    3/29/2010    2010    one ac and hs     

 

                Keflex 500 mg oral capsule    2010       10/1/2010       take 1 capsule (500 mg) by oral

 route every 6 hours for 10 days         

 

                Bactrim -160 mg oral tablet    2011       take 1 tablet by oral route

 every 12 hours for 7 days               

 

                triamcinolone acetonide 0.1 % topical cream    2011      apply a thin

 layer to the affected area(s) by topical route 2 times per day     

 

                sertraline 100 mg oral tablet    4/10/2012       5/10/2012       take 1.5 tablets by oral route

 daily for 30 days                       

 

                ergocalciferol (vitamin D2) 50,000 unit oral capsule    4/15/2013       2013       TAKE

 ONE CAPSULE BY MOUTH ONCE A WEEK        

 

                CYANOCOBALAM 1000MCGINJ 1000 milliliter    2013       INJECT 1ML INTRAMUSCULAR

 ONCE A MONTH                            

 

                pravastatin 40 mg oral tablet    3/25/2014       3/20/2015       TAKE ONE TABLET BY MOUTH EVERY

 DAY                                     

 

                          Zostavax (PF) 19,400 unit/0.65 mL subcutaneous suspension for reconstitution    3/23/2015

                    3/24/2015           inject 0.65 milliliter by subcutaneous route once     

 

                famciclovir 500 mg oral tablet    12/3/2015       12/10/2015      take 1 tablet (500 mg) by

 oral route every 8 hours for 7 days     

 

                furosemide 40 mg oral tablet    2016      take 1 tablet (40 mg) by oral

 route once daily                        

 

                Cipro 500 mg oral tablet    2016       take 1 tablet (500 mg) by oral route

 2 times per day for 5 days              

 

                Bactrim -160 mg oral tablet    2016        take 1 tablet by oral route

 every 12 hours for 7 days               

 

                metoclopramide HCl 10 mg oral tablet    2017       take 1 tablet by oral

 route 2 times a day for 50 days         

 

                Macrobid 100 mg oral capsule    2017       take 1 capsule (100 mg) by oral

 route 2 times per day with food for 7 days     

 

                Augmentin 875-125 mg oral tablet    2017       take 1 tablet by oral route

 every 12 hours for 7 days               

 

                amoxicillin 500 mg oral tablet    2017       take 1 tablet (500 mg) by oral

 route every 12 hours for 7 days         

 

                cefuroxime axetil 500 mg oral tablet    2017       take 1 tablet (500 mg)

 by oral route 2 times per day for 7 days     









                                        Discontinued 

 

             Name         Start Date    Discontinued Date    SIG          Comments

 

                Tylenol 325 mg oral tablet                    2013        take 1 - 2 tablets (325 -650 mg) by oral

 route every 4-6 hours as needed         

 

                Calcium 600 + D(3) 600 mg(1,500mg) -400 unit oral tablet                    2011       take 1 tablet

 by oral route 2 times a day            no longer taking

 

                Vitamin B-12 1,000 mcg oral tablet extended release    2010       take 1

 tablet by oral route daily             no longer taking

 

                Antifungal (clotrimazole) 1 % topical cream    2010       apply to the 

affected and surrounding areas of skin by topical route 2 times per day morning 
and evening                              

 

                sertraline 100 mg oral tablet    5/10/2011       2011       take 2 tablets (200 mg) by 

oral route once daily                   discontinued by Dr. Serrano

 

                mirtazapine 15 mg oral tablet                    2011        take 1 tablet (15 mg) by oral route 

once daily before bedtime               Dr. Serrano

 

                mirtazapine 15 mg oral tablet                    2011        take 1 tablet (15 mg) by oral route 

once daily before bedtime               dc'd by Dr. Serrano

 

                Pristiq 50 mg oral tablet extended release 24 hr                    2013        take 1 tablet (50

 mg) by oral route once daily           Dr. Serrano

 

                Pristiq 50 mg oral tablet extended release 24 hr                    2013        take 1 tablet (50

 mg) by oral route once daily           dose updated

 

                Vitamin B-12 1,000 mcg/mL injection solution    2011        inject 1 milliliter

 (1,000 mcg) by intramuscular route once a month    on list already

 

                    syringe with needle 1 mL 25 gauge x 1" miscellaneous syringe    2011

                          use for injection once a month     

 

                clotrimazole 1 % topical cream    2011        apply to the affected and surrounding

 areas of skin by topical route 2 times per day in the morning and evening     

 

                Vitamin D2 50,000 unit oral capsule    2011        take 1 capsule (50,000

 unit) by oral route once weekly        generic on list

 

                Pravachol 40 mg oral tablet    2012        take 1 tablet (40 mg) by oral 

route once daily for 90 days            generic on list

 

                lithium carbonate 300 mg oral capsule    2012        take 1 capsule by oral

 route daily                            dose updated

 

                Pristiq 100 mg oral tablet extended release 24 hr                    4/10/2012       take 1 and 1/2 

tablet (150 mg) by oral route once daily    Mental Health provider

 

                Pristiq 100 mg oral tablet extended release 24 hr                    4/10/2012       take 1 and 1/2 

tablet (150 mg) by oral route once daily    Discontinued by Dr Efrain Knight at [facility]

 

                hydroxyzine HCl 50 mg oral tablet    10/16/2014      2015       take 1 tablet (50 mg) 

by oral route at bedtime                 

 

                lithium carbonate 300 mg oral capsule    2015       take 1 capsule (300

 mg) by oral route 2 for 30 days         

 

                fluconazole 100 mg oral tablet    2015       12/3/2015       take 1 tablet (100 mg) by 

oral route once a week                   

 

                ketoconazole 2 % topical cream    2015       12/3/2015       apply to the affected area(s)

 by topical route 2 times per day        

 

                prednisone 10 mg oral tablet    12/3/2015       2016        take 2 tablets (20 mg) by oral

 route once daily for 4 days 1 tablet daily for 4 days 0.5 tablet daily for 4 
days                                     

 

                triamcinolone acetonide 0.1 % topical cream    2016       apply a thin layer

 to the affected area(s) by topical route 2 times per day     

 

                Cipro 500 mg oral tablet    1/15/2017       2017       take 1 tablet (500 mg) by oral route

 every 12 hours for 10 days              







Problem List







                    Description         Status              Onset

 

                    Artificial opening status; colostomy    Active               

 

                    Bipolar disorder, unspecified    Active               

 

                    Hyperlipidemia      Active               

 

                    Peritoneal Neoplasm, Malignant    Active               

 

                    Anemia, Pernicious    Active               

 

                    Arthritis unspecified    Active               

 

                    B12 deficiency      Active               







Vital Signs







      Date    Time    BP-Sys(mm[Hg]    BP-Lynn(mm[Hg])    HR(bpm)    RR(rpm)    Temp    WT    HT    HC    BMI

                    BSA                 BMI Percentile      O2 Sat(%)

 

        2017    3:05:00 PM    134 mmHg    70 mmHg    70 bpm    20 rpm    97.4 F    181 lbs    69 in

                          26.73 kg/m2    2.00 m2                   98 %

 

        2017    11:07:00 AM    124 mmHg    64 mmHg    62 bpm    17 rpm    98.2 F    181.2 lbs    69

 in                       26.7583 kg/m    2.0003 m                 98 %

 

        1/15/2017    3:34:00 PM    148 mmHg    89 mmHg    69 bpm    20 rpm    98.2 F    179 lbs    69 in

                          26.43 kg/m2    1.99 m2                   98 %

 

       2017    1:51:00 PM    160 mmHg    90 mmHg    100 bpm    20 rpm    96.5 F    179 lbs             

                                                                98 %

 

       2016    3:11:00 PM    134 mmHg    76 mmHg    80 bpm    20 rpm    98 F    163 lbs    69 in     

                24.07 kg/m2     1.90 m2                         98 %

 

        2016    2:04:00 PM    142 mmHg    86 mmHg    68 bpm    16 rpm    98.5 F    166 lbs    63 in

                          29.4053 kg/m    1.8295 m                 100 %

 

        2016    11:27:00 AM    148 mmHg    78 mmHg    90 bpm    20 rpm    98.2 F    153 lbs    69 in

                          22.59 kg/m2    1.84 m2                   96 %

 

        12/3/2015    9:50:00 AM    132 mmHg    70 mmHg    62 bpm    16 rpm    97.9 F    145 lbs    69 in

                          21.4125 kg/m    1.7894 m                 100 %

 

        2015    8:52:00 AM    132 mmHg    68 mmHg    52 bpm    20 rpm    97.8 F    141 lbs    69 in

                          20.82 kg/m2    1.76 m2                   100 %

 

        2015    3:25:00 PM    120 mmHg    62 mmHg    72 bpm    16 rpm    98.1 F    136 lbs    69 in

                          20.0835 kg/m    1.733 m                 98 %

 

       3/23/2015    2:55:00 PM    130 mmHg    76 mmHg    68 bpm    18 rpm    97 F    140 lbs    69 in    

                20.67 kg/m2     1.76 m2                         98 %

 

        10/16/2014    11:11:00 AM    120 mmHg    66 mmHg    77 bpm    20 rpm    98 F    130 lbs    69 in

                          19.1974 kg/m    1.6943 m                 100 %

 

        2014    3:21:00 PM    130 mmHg    66 mmHg    63 bpm    18 rpm    97.2 F    160 lbs    69 in

                          23.6276 kg/m    1.88 m2                   99 %

 

        2013    10:35:00 AM    132 mmHg    70 mmHg    66 bpm    20 rpm    98.1 F    157 lbs    69 in

                          23.18 kg/m2    1.862 m                  

 

        2013    1:29:00 PM    132 mmHg    70 mmHg    76 bpm    18 rpm    98.2 F    166 lbs    69 in 

                          24.5137 kg/m    1.91 m2                    

 

       2013    2:46:00 PM    128 mmHg    70 mmHg    76 bpm    16 rpm    98 F    160 lbs    69 in     

                23.63 kg/m2     1.8797 m                       

 

        2011    8:49:00 AM    128 mmHg    78 mmHg    70 bpm    18 rpm    97.9 F    164 lbs    69 in

                          24.2183 kg/m    1.90 m2                    

 

     2011    1:31:00 PM    132 mmHg    68 mmHg    84 bpm         97 F    167 lbs                        

                                         

 

        2011    9:09:00 AM    128 mmHg    70 mmHg    72 bpm    18 rpm    98.2 F    163 lbs    64 in 

                          27.9786 kg/m    1.83 m2                    

 

       2011    10:01:00 AM    132 mmHg    70 mmHg    72 bpm    18 rpm    98.2 F    154 lbs             

                                                                 

 

       2011    2:47:00 PM    128 mmHg    70 mmHg    72 bpm    18 rpm    97.8 F    156 lbs             

                                                                 

 

       5/10/2011    3:16:00 PM    144 mmHg    80 mmHg    72 bpm    18 rpm    98.2 F    158 lbs             

                                                                 

 

        2011    10:11:00 AM    132 mmHg    70 mmHg    70 bpm    18 rpm    98.2 F    168 lbs    69 in

                          24.809 kg/m    1.93 m2                    

 

        4/15/2011    10:52:00 AM    110 mmHg    60 mmHg    75 bpm    16 rpm    97.5 F    172.375 lbs    

69 in                     25.46 kg/m2    1.951 m                 100 %

 

        2011    11:43:00 AM    120 mmHg    82 mmHg    75 bpm    16 rpm    97.2 F    178.5 lbs    69

 in                       26.3596 kg/m    1.99 m2                   100 %

 

        10/15/2010    1:32:00 PM    120 mmHg    70 mmHg    80 bpm    18 rpm    96.6 F    177 lbs    69 in

                          26.14 kg/m2    1.977 m                 100 %

 

        2010    3:50:00 PM    168 mmHg    100 mmHg    82 bpm    18 rpm    97.8 F    177.5 lbs    69

 in                       26.2119 kg/m    1.98 m2                   97 %

 

        2010    1:21:00 PM    140 mmHg    80 mmHg    59 bpm    16 rpm    97.6 F    173.25 lbs    69 

in                        25.58 kg/m2    1.956 m                 100 %

 

        2010    3:02:00 PM    140 mmHg    80 mmHg    61 bpm    16 rpm    97.6 F    173.125 lbs    69

 in                       25.5658 kg/m    1.96 m2                   99 %

 

        2010    1:23:00 PM    130 mmHg    80 mmHg    66 bpm    16 rpm    96.8 F    173 lbs    69 in 

                          25.55 kg/m2    1.9546 m                 100 %

 

        2010    12:58:00 PM    130 mmHg    88 mmHg    75 bpm    16 rpm    98.4 F    172.25 lbs    69

 in                       25.4366 kg/m    1.95 m2                   100 %







Social History







                    Name                Description         Comments

 

                    denies alcohol use                         

 

                    denies smoking                           

 

                    Denies illicit substance abuse                         

 

                    retired                                 direct care

 

                    Single                                   

 

                    Exercises regularly                         

 

                    Attended some college                         

 

                    Tobacco             Never smoker         







History of Procedures







                    Date Ordered        Description         Order Status

 

                    2010 12:00 AM    COMPREHEN METABOLIC PANEL    Reviewed

 

                    2010 12:00 AM    COMPLETE CBC W/AUTO DIFF WBC    Reviewed

 

                    2010 12:00 AM    LIPID PANEL         Reviewed

 

                          2015 12:00 AM        B12 Injection, Up to 1000 Mcg NDC#7160-1340-51 [facility] 

                                        Reviewed

 

                    2011 12:00 AM    MAMMOGRAM SCREENING    Reviewed

 

                    2011 12:00 AM    CYTOPATH C/V THIN LAYER    Reviewed

 

                    2011 12:00 AM    B12 Injection 1 cc NDC#[address]-84    Reviewed

 

                    2015 12:00 AM    THER/PROPH/DIAG INJ SC/IM    Reviewed

 

                    2015 12:00 AM    B12 Injection, Up to 1000 Mcg NDC#1640-3574-84    Reviewed

 

                    2011 12:00 AM    THER/PROPH/DIAG INJ SC/IM    Reviewed

 

                    2011 12:00 AM    B12 Injection([facility]) Ndc#0345-9058-65-    Reviewed

 

                    2015 12:00 AM    THER/PROPH/DIAG INJ SC/IM    Reviewed

 

                    2015 12:00 AM    B12 Injection, Up to 1000 Mcg NDC#2211-1534-87    Reviewed

 

                    10/20/2011 12:00 AM    THER/PROPH/DIAG INJ SC/IM    Reviewed

 

                    10/20/2011 12:00 AM    B12 Injection([place]) Ndc#4558-2575-05-    Reviewed

 

                    2016 12:00 AM    THER/PROPH/DIAG INJ SC/IM    Reviewed

 

                    2016 12:00 AM    B12 Injection, Up to 1000 Mcg NDC#1760-3433-31    Reviewed

 

                    3/14/2016 12:00 AM    VITAMIN B-12        Reviewed

 

                    3/15/2016 12:00 AM    THER/PROPH/DIAG INJ SC/IM    Reviewed

 

                    3/15/2016 12:00 AM    B12 Injection, Up to 1000 Mcg NDC#5331-8879-10    Reviewed

 

                    2011 12:00 AM    ***Immunization administration, Medicare flu    Reviewed

 

                    2011 12:00 AM    Fluzone ** MEDICARE Only **    Reviewed

 

                    2011 12:00 AM    THER/PROPH/DIAG INJ SC/IM    Reviewed

 

                    2011 12:00 AM    B12 Injection ([employer] [facility]) Ndc#4520-8249-41    Reviewed

 

                    2016 12:00 AM    B12 Injection, Up to 1000 Mcg NDC#6797-3080-09 [facility]    

Reviewed

 

                    2016 12:00 AM    TTE W/DOPPLER COMPLETE    Reviewed

 

                    2016 12:00 AM    EXTREMITY STUDY     Reviewed

 

                          2016 12:00 AM        B12 Injection, Up to 1000 Mcg NDC#4586-6245-16 [facility] Medicare 

                                        Reviewed

 

                    2016 12:00 AM    THER/PROPH/DIAG INJ SC/IM    Reviewed

 

                    2016 12:00 AM    B12 Injection, Up to 1000 Mcg NDC#9511-0527-62    Reviewed

 

                    2016 12:00 AM    THER/PROPH/DIAG INJ SC/IM    Reviewed

 

                    2012 12:00 AM    B12 Injection([place]) Ndc#8127-4684-31-    Reviewed

 

                    2016 12:00 AM    B12 Injection, Up to 1000 Mcg NDC#2040-4776-58    Reviewed

 

                    2016 12:00 AM    THER/PROPH/DIAG INJ SC/IM    Reviewed

 

                    2012 12:00 AM    THER/PROPH/DIAG INJ SC/IM    Reviewed

 

                    2012 12:00 AM    B12 Injection ([employer]) Ndc#6742-4744-69    Reviewed

 

                    2016 12:00 AM    THER/PROPH/DIAG INJ SC/IM    Reviewed

 

                    2016 12:00 AM    B12 Injection, Up to 1000 Mcg NDC#2260-9888-62    Reviewed

 

                    2016 12:00 AM    B12 Injection, Up to 1000 Mcg NDC#9607-1435-94    Reviewed

 

                    2016 12:00 AM    THER/PROPH/DIAG INJ SC/IM    Reviewed

 

                    2012 12:00 AM    THER/PROPH/DIAG INJ SC/IM    Reviewed

 

                    2012 12:00 AM    B12 Injection([place]) Ndc#3614-2651-09-    Reviewed

 

                    12/15/2016 12:00 AM    B12 Injection, Up to 1000 Mcg NDC#4608-3207-57    Reviewed

 

                    12/15/2016 12:00 AM    THER/PROPH/DIAG INJ SC/IM    Reviewed

 

                    2016 12:00 AM    URNLS DIP STICK/TABLET RGNT AUTO W/O MICROSCOPY    Reviewed

 

                    1/3/2017 12:00 AM    URNLS DIP STICK/TABLET RGNT AUTO W/O MICROSCOPY    Reviewed

 

                    2017 12:00 AM    URINE CULTURE/COLONY COUNT    Reviewed

 

                    2017 12:00 AM    Rocephin 1 gram Injection, [facility]    Reviewed

 

                    2017 12:00 AM    THERAPEUTIC PROPHYLACTIC/DX INJECTION SUBQ/IM    Reviewed

 

                    2017 12:00 AM    B12 1000mcg Injection, [facility]    Reviewed

 

                    5/3/2012 12:00 AM    THER/PROPH/DIAG INJ SC/IM    Reviewed

 

                    5/3/2012 12:00 AM    B12 Injection([place]) Ndc#3678-8221-38-    Reviewed

 

                    2017 12:00 AM    THERAPEUTIC PROPHYLACTIC/DX INJECTION SUBQ/IM    Reviewed

 

                    2017 12:00 AM    B12 1000mcg Injection, [facility]    Reviewed

 

                    2017 12:00 AM    MRI BRAIN STEM W/O & W/DYE    Reviewed

 

                    2017 12:00 AM    VITAMIN B-12        Reviewed

 

                    2017 12:00 AM    Speech Therapy Consult    Reviewed

 

                    2017 12:00 AM    ASSAY OF CREATININE    Reviewed

 

                    2012 12:00 AM    IMMUNOTHERAPY INJECTIONS    Reviewed

 

                    2012 12:00 AM    B12 Injection([place]) Ndc#4006-1186-43-    Reviewed

 

                    2017 12:00 AM    URINALYSIS AUTO W/SCOPE    Reviewed

 

                    2012 12:00 AM    THER/PROPH/DIAG INJ SC/IM    Reviewed

 

                    2012 12:00 AM    B12 Injection, Up to 1000 Mcg NDC#5265-9225-38    Reviewed

 

                    2017 12:00 AM    URINALYSIS AUTO W/SCOPE    Reviewed

 

                    2012 12:00 AM    THER/PROPH/DIAG INJ SC/IM    Reviewed

 

                    2012 12:00 AM    B12 Injection, Up to 1000 Mcg NDC#8228-8625-20    Reviewed

 

                    2012 12:00 AM    THER/PROPH/DIAG INJ SC/IM    Reviewed

 

                    2012 12:00 AM    B12 Injection, Up to 1000 Mcg NDC#3352-5364-84    Reviewed

 

                    10/16/2012 12:00 AM    THER/PROPH/DIAG INJ SC/IM    Reviewed

 

                    10/16/2012 12:00 AM    B12 Injection, Up to 1000 Mcg NDC#3775-5263-07    Reviewed

 

                    2010 12:00 AM    COMPREHEN METABOLIC PANEL    Reviewed

 

                    2010 12:00 AM    COMPLETE CBC W/AUTO DIFF WBC    Reviewed

 

                    2010 12:00 AM    LIPID PANEL         Reviewed

 

                    2013 12:00 AM    Flu Injection 3 Years And Above NDC# [address]-26  [facility]    Reviewed



 

                    2013 12:00 AM    COMPLETE CBC W/AUTO DIFF WBC    Reviewed

 

                    2013 12:00 AM    ASSAY OF LITHIUM    Reviewed

 

                    2013 12:00 AM    METABOLIC PANEL TOTAL CA    Reviewed

 

                    4/3/2013 12:00 AM    THER/PROPH/DIAG INJ SC/IM    Reviewed

 

                    4/3/2013 12:00 AM    B12 Injection, Up to 1000 Mcg NDC#2969-2737-66    Reviewed

 

                    2013 12:00 AM    THER/PROPH/DIAG INJ SC/IM    Reviewed

 

                    2013 12:00 AM    B12 Injection, Up to 1000 Mcg NDC#4406-7012-12    Reviewed

 

                    2013 12:00 AM    THER/PROPH/DIAG INJ SC/IM    Reviewed

 

                    2013 12:00 AM    B12 Injection, Up to 1000 Mcg NDC#6326-7700-63    Reviewed

 

                    2013 12:00 AM    LIPID PANEL         Reviewed

 

                    2013 12:00 AM    VITAMIN D 25 HYDROXY    Reviewed

 

                    2013 12:00 AM    THER/PROPH/DIAG INJ SC/IM    Reviewed

 

                    2013 12:00 AM    B12 Injection, Up to 1000 Mcg NDC#9288-8556-66    Reviewed

 

                    2013 12:00 AM    THER/PROPH/DIAG INJ SC/IM    Reviewed

 

                    3/6/2014 12:00 AM    THER/PROPH/DIAG INJ SC/IM    Reviewed

 

                    2014 12:00 AM    THER/PROPH/DIAG INJ SC/IM    Reviewed

 

                    2014 12:00 AM    B12 Injection, Up to 1000 Mcg NDC#0896-1558-52    Reviewed

 

                    2010 12:00 AM    SKIN FUNGI CULTURE    Reviewed

 

                    10/9/2010 12:00 AM    COMPREHEN METABOLIC PANEL    Reviewed

 

                    10/9/2010 12:00 AM    LIPID PANEL         Reviewed

 

                    2010 12:00 AM    THER/PROPH/DIAG INJ SC/IM    Reviewed

 

                    2010 12:00 AM    B12 Injection Ndc#[address]-22 ([place])    Reviewed

 

                    2010 12:00 AM    THER/PROPH/DIAG INJ SC/IM    Reviewed

 

                    2010 12:00 AM    Kenalog 40 Mg Im-Ndc#[address]-82 ([place])    Reviewed

 

                    10/15/2010 12:00 AM    FLU VACCINE 3 YRS & > IM    Reviewed

 

                    10/15/2010 12:00 AM    Admin.Of M/C Cov.Vaccine-Flu Vacc.    Reviewed

 

                    1/15/2011 12:00 AM    COMPLETE CBC W/AUTO DIFF WBC    Reviewed

 

                    1/15/2011 12:00 AM    COMPREHEN METABOLIC PANEL    Reviewed

 

                    1/15/2011 12:00 AM    LIPID PANEL         Reviewed

 

                    2014 12:00 AM    MAMMOGRAM SCREENING    Reviewed

 

                    2014 12:00 AM    Screening mammography, bilateral    Reviewed

 

                    7/10/2014 12:00 AM    THER/PROPH/DIAG INJ SC/IM    Reviewed

 

                    7/10/2014 12:00 AM    B12 Injection, Up to 1000 Mcg NDC#7098-6187-39    Reviewed

 

                    2011 12:00 AM    COMPLETE CBC W/AUTO DIFF WBC    Reviewed

 

                    2011 12:00 AM    COMPREHEN METABOLIC PANEL    Reviewed

 

                    2011 12:00 AM    LIPID PANEL         Reviewed

 

                    2014 12:00 AM    B12 Injection, Up to 1000 Mcg NDC#4211-0397-33    Reviewed

 

                    10/19/2014 12:00 AM    MAMMOGRAM SCREENING    Reviewed

 

                    10/19/2014 12:00 AM    Screening mammography, bilateral    Reviewed

 

                    10/16/2014 12:00 AM    B12 Injection, Up to 1000 Mcg NDC#0265-9872-67    Reviewed

 

                    10/16/2014 12:00 AM    COMPLETE CBC W/AUTO DIFF WBC    Reviewed

 

                    10/16/2014 12:00 AM    COMPREHEN METABOLIC PANEL    Reviewed

 

                    10/16/2014 12:00 AM    IMMUNOASSAY TUMOR     Reviewed

 

                    10/16/2014 12:00 AM    LIPID PANEL         Reviewed

 

                    10/16/2014 12:00 AM    ASSAY OF LITHIUM    Reviewed

 

                    10/16/2014 12:00 AM    MAMMOGRAM SCREENING    Reviewed

 

                    2011 12:00 AM    ASSAY OF PARATHORMONE    Reviewed

 

                    2011 12:00 AM    VITAMIN D 25 HYDROXY    Reviewed

 

                    2011 12:00 AM    ASSAY OF LITHIUM    Reviewed

 

                    2011 12:00 AM    METABOLIC PANEL TOTAL CA    Reviewed

 

                    2011 12:00 AM    CT HEAD/BRAIN W/O & W/DYE    Reviewed

 

                    3/23/2015 12:00 AM    PNEUMOCOCCAL VACC 13 GLENDY IM    Reviewed

 

                    3/23/2015 12:00 AM    Vitamin B12 injection    Reviewed

 

                    2011 12:00 AM    ASSAY OF LITHIUM    Reviewed

 

                    2011 12:00 AM    B12 Injection Ndc#[address]-09  [place]    Reviewed

 

                    2015 12:00 AM    THER/PROPH/DIAG INJ SC/IM    Reviewed

 

                    2015 12:00 AM    B12 Injection, Up to 1000 Mcg NDC#1060-7042-61    Reviewed

 

                    2015 12:00 AM    COMPLETE CBC W/AUTO DIFF WBC    Reviewed

 

                    2015 12:00 AM    COMPREHEN METABOLIC PANEL    Reviewed

 

                    2015 12:00 AM    LIPID PANEL         Reviewed

 

                    2015 12:00 AM    ASSAY OF LITHIUM    Reviewed

 

                    2011 12:00 AM    VIT D 1 25-DIHYDROXY    Reviewed

 

                    2011 12:00 AM    VITAMIN B-12        Reviewed

 

                    2015 12:00 AM    B12 Injection, Up to 1000 Mcg NDC#5444-1472-81    Reviewed

 

                    2015 12:00 AM    THER/PROPH/DIAG INJ SC/IM    Reviewed

 

                    2015 12:00 AM    B12 Injection, Up to 1000 Mcg NDC#9544-2396-76    Reviewed

 

                    2011 12:00 AM    THER/PROPH/DIAG INJ SC/IM    Reviewed

 

                    2011 12:00 AM    B12 Injection ([employer]) Ndc#3477-8805-74    Reviewed

 

                    2015 12:00 AM    THER/PROPH/DIAG INJ SC/IM    Reviewed

 

                    2015 12:00 AM    B12 Injection, Up to 1000 Mcg NDC#8120-6451-42    Reviewed







Results Summary







                          Data and Description      Results

 

                          2004 12:00 AM        Colonoscopy-Women and Men over 50 Normal 

 

                          2008 12:00 AM         Pap Smear Declined 

 

                          10/7/2009 12:00 AM        Cholest Cry Stone Ql .0 %LDLc SerPl-mCnc 123.0 mg/dLHDLc

 SerPl-mCnc 34.0 mg/dLTrigl SerPl-mCnc 190.0 mg/dLGlucose SerPl-mCnc 78.0 mg/dL

 

                          2009 12:00 AM        Mammogram -Women over 40 Normal HIV1+2 Ab Ser Ql no risk 

 

                          2010 8:47 AM         Dexa Bone Scan Refused Aspirin reccommended Contraindication 



 

                          2010 8:48 AM         Depression Done 

 

                          2010 12:00 AM         Foot Exam-Diabetic Done 

 

                          2010 12:00 AM         Cholest Cry Stone Ql .0 %LDLc SerPl-mCnc 126.0 mg/dLGlucose

 SerPl-mCnc 102.0 mg/dL

 

                          2010 8:45 AM          TRIGLYCERIDES 122.0 mg/dLCHOLESTEROL 186.0 mg/dLHDL 36.0 mg/dLTOT

 CHOL/HDL 5.2 LDL (CALC) 126.0 mg/dLGLUCOSE 102.0 mg/dLSODIUM 143.0 
mmol/LPOTASSIUM 3.70 mmol/LCHLORIDE 111.0 mmol/LCO2 23.0 mmol/LBUN 10.0 
mg/dLCREATININE 0.80 mg/dLSGOT/AST 12.0 IU/LSGPT/ALT 11.0 IU/LALK PHOS 65.0 
IU/LTOTAL PROTEIN 7.20 g/dLALBUMIN 3.90 g/dLTOTAL BILI 0.50 mg/dLCALCIUM 10.20 
mg/dLAGE 59 GFR NonAA 73 GFR AA 88 eGFR >60 mL/min/1.73 m2eGFR AA* >60 WBC 5.7 
RBC 3.26 HGB 10.60 g/dLHCT 31.70 %MCV 97.0 fLMCH 32.50 pgMCHC 33.40 g/dLRDW SD 
47 RDW CV 13.30 %MPV 9.70 fLPLT 287 NRBC# 0.00 NRBC% 0.0 %NEUT 62.90 %%LYMP 
21.80 %%MONO 9.90 %%EOS 5.0 %%BASO 0.40 %#NEUT 3.56 #LYMP 1.23 #MONO 0.56 #EOS 
0.28 #BASO 0.02 MANUAL DIFF NOT IND 

 

                          2010 12:00 AM        Glucose SerPl-mCnc 96.0 mg/dLCholest Cry Stone Ql .0 %LDLc

 SerPl-mCnc 146.0 mg/dL

 

                          2010 8:26 AM         TRIGLYCERIDES 106.0 mg/dLCHOLESTEROL 199.0 mg/dLHDL 32.0 mg/dLTOT

 CHOL/HDL 6.2 LDL (CALC) 146.0 mg/dLGLUCOSE 96.0 mg/dLSODIUM 143.0 
mmol/LPOTASSIUM 4.0 mmol/LCHLORIDE 113.0 mmol/LCO2 24.0 mmol/LBUN 13.0 
mg/dLCREATININE 1.0 mg/dLSGOT/AST 11.0 IU/LSGPT/ALT 6.0 IU/LALK PHOS 56.0 
IU/LTOTAL PROTEIN 6.60 g/dLALBUMIN 3.80 g/dLTOTAL BILI 0.50 mg/dLCALCIUM 9.30 
mg/dLAGE 59 GFR NonAA 57 GFR AA 69 eGFR 57 eGFR AA* >60 

 

                          10/6/2010 12:00 AM        Cholest Cry Stone Ql .0 %LDLc SerPl-mCnc 111.0 mg/dLGlucose

 SerPl-mCnc 81.0 mg/dL

 

                          10/6/2010 2:45 PM         TRIGLYCERIDES 123.0 mg/dLCHOLESTEROL 178.0 mg/dLHDL 42.0 mg/dLTOT

 CHOL/HDL 4.2 LDL (CALC) 111.0 mg/dLGLUCOSE 81.0 mg/dLSODIUM 139.0 
mmol/LPOTASSIUM 4.10 mmol/LCHLORIDE 106.0 mmol/LCO2 24.0 mmol/LBUN 13.0 
mg/dLCREATININE 0.90 mg/dLSGOT/AST 13.0 IU/LSGPT/ALT 11.0 IU/LALK PHOS 61.0 
IU/LTOTAL PROTEIN 7.10 g/dLALBUMIN 3.90 g/dLTOTAL BILI 0.30 mg/dLCALCIUM 9.30 
mg/dLAGE 60 GFR NonAA 64 GFR AA 78 eGFR >60 mL/min/1.73 m2eGFR AA* >60 WBC 6.9 
RBC 3.59 HGB 11.50 g/dLHCT 35.30 %MCV 98.0 fLMCH 32.0 pgMCHC 32.60 g/dLRDW SD 46
 RDW CV 12.90 %MPV 9.90 fLPLT 311 NRBC# 0.00 NRBC% 0.0 %NEUT 64.90 %%LYMP 22.50 
%%MONO 7.20 %%EOS 5.10 %%BASO 0.30 %#NEUT 4.45 #LYMP 1.54 #MONO 0.49 #EOS 0.35 
#BASO 0.02 MANUAL DIFF NOT IND 

 

                          2011 12:00 AM         Mammogram -Women over 40 Ordered 

 

                          2011 10:25 AM        TRIGLYCERIDES 111.0 mg/dLCHOLESTEROL 195.0 mg/dLHDL 43.0 mg/dLTOT

 CHOL/HDL 4.5 LDL (CALC) 130.0 mg/dLWBC 5.3 RBC 3.76 HGB 12.0 g/dLHCT 37.80 %MCV
 101.0 fLMCH 31.90 pgMCHC 31.70 g/dLRDW SD 47 RDW CV 13.0 %MPV 9.70 fLPLT 259 
NRBC# 0.00 NRBC% 0.0 %NEUT 69.0 %%LYMP 17.60 %%MONO 8.30 %%EOS 4.70 %%BASO 0.40 
%#NEUT 3.63 #LYMP 0.93 #MONO 0.44 #EOS 0.25 #BASO 0.02 MANUAL DIFF NOT IND 
GLUCOSE 102.0 mg/dLSODIUM 146.0 mmol/LPOTASSIUM 4.20 mmol/LCHLORIDE 113.0 
mmol/LCO2 23.0 mmol/LBUN 15.0 mg/dLCREATININE 1.0 mg/dLSGOT/AST 12.0 
IU/LSGPT/ALT 17.0 IU/LALK PHOS 60.0 IU/LTOTAL PROTEIN 6.90 g/dLALBUMIN 4.20 
g/dLTOTAL BILI 0.40 mg/dLCALCIUM 9.70 mg/dLAGE 60 GFR NonAA 57 GFR AA 69 eGFR 57
 eGFR AA* >60 

 

                          2011 11:49 AM        Cholest Cry Stone Ql .0 %LDLc SerPl-mCnc 130.0 mg/dLHDLc

 SerPl-mCnc 43.0 mg/dLTrigl SerPl-mCnc 111.0 mg/dLGlucose SerPl-mCnc 102.0 mg/dL

 

                          2011 11:52 AM        Pap Smear Declined 

 

                          2011 11:28 AM        Lithium 2.080 mmol/LGLUCOSE 102.0 mg/dLSODIUM 135.0 mmol/LPOTASSIUM

 3.90 mmol/LCHLORIDE 106.0 mmol/LCO2 21.0 mmol/LBUN 12.0 mg/dLCREATININE 1.30 
mg/dLCALCIUM 10.70 mg/dLAGE 60 GFR NonAA 42 GFR AA 51 eGFR 42 eGFR AA* 51 

 

                          2011 8:58 AM          Lithium 0.690 mmol/L

 

                          2011 2:38 PM         VITAMIN B12 3483.0 pg/mL

 

                          2013 3:35 PM          WBC 5.1 RBC 3.73 HGB 11.70 g/dLHCT 36.40 %MCV 98.0 fLMCH 31.40

 pgMCHC 32.10 g/dLRDW SD 47 RDW CV 13.10 %MPV 9.80 fLPLT 224 NRBC# 0.00 NRBC% 
0.0 %NEUT 66.80 %%LYMP 19.10 %%MONO 9.0 %%EOS 4.90 %%BASO 0.20 %#NEUT 3.42 #LYMP
 0.98 #MONO 0.46 #EOS 0.25 #BASO 0.01 MANUAL DIFF NOT IND GLUCOSE 88.0 
mg/dLSODIUM 141.0 mmol/LPOTASSIUM 4.10 mmol/LCHLORIDE 110.0 mmol/LCO2 22.0 
mmol/LBUN 22.0 mg/dLCREATININE 1.10 mg/dLCALCIUM 9.80 mg/dLAGE 62 GFR NonAA 50 
GFR AA 61 eGFR 50 eGFR AA* 60 Lithium 0.760 mmol/L

 

                          2013 11:02 AM        TRIGLYCERIDES 106.0 mg/dLCHOLESTEROL 181.0 mg/dLHDL 46.0 mg/dLTOT

 CHOL/HDL 3.9 LDL (CALC) 114.0 mg/dLVITAMIN D 41.10 ng/mL

 

                          10/17/2014 10:10 AM       WBC 5.0 RBC 3.66 HGB 11.60 g/dLHCT 36.80 %.0 fLMCH 31.70

 pgMCHC 31.50 g/dLRDW SD 50 RDW CV 13.50 %MPV 10.10 fLPLT 209 NRBC# 0.00 NRBC% 
0.0 %NEUT 69.20 %%LYMP 21.0 %%MONO 6.40 %%EOS 3.20 %%BASO 0.20 %#NEUT 3.46 #LYMP
 1.05 #MONO 0.32 #EOS 0.16 #BASO 0.01 MANUAL DIFF NOT IND GLUCOSE 100.0 
mg/dLSODIUM 148.0 mmol/LPOTASSIUM 3.90 mmol/LCHLORIDE 114.0 mmol/LCO2 26.0 
mmol/LBUN 12.0 mg/dLCREATININE 1.20 mg/dLSGOT/AST 9.0 IU/LSGPT/ALT <6 IU/LALK 
PHOS 82.0 IU/LTOTAL PROTEIN 6.90 g/dLALBUMIN 4.0 g/dLTOTAL BILI 0.40 
mg/dLCALCIUM 10.50 mg/dLAGE 64 GFR NonAA 45 GFR AA 55 eGFR 45 eGFR AA* 55 
TRIGLYCERIDES 96.0 mg/dLCHOLESTEROL 155.0 mg/dLHDL 38.0 mg/dLTOT CHOL/HDL 4.1 
LDL (CALC) 98.0 mg/dLLithium 0.850 mmol/LCancer Antigen (CA) 125 8.30 U/mL

 

                          2015 10:25 AM        Lithium 0.790 mmol/LWBC 4.8 RBC 3.44 HGB 11.0 g/dLHCT 35.20 

%.0 fLMCH 32.0 pgMCHC 31.30 g/dLRDW SD 53 RDW CV 14.0 %MPV 9.30 fLPLT 210
 NRBC# 0.00 NRBC% 0.0 %NEUT 70.80 %%LYMP 17.20 %%MONO 8.10 %%EOS 3.50 %%BASO 
0.40 %#NEUT 3.41 #LYMP 0.83 #MONO 0.39 #EOS 0.17 #BASO 0.02 MANUAL DIFF NOT IND 
TRIGLYCERIDES 107.0 mg/dLCHOLESTEROL 174.0 mg/dLHDL 43.0 mg/dLTOT CHOL/HDL 4.0 
LDL (CALC) 110.0 mg/dLGLUCOSE 90.0 mg/dLSODIUM 145.0 mmol/LPOTASSIUM 3.80 
mmol/LCHLORIDE 115.0 mmol/LCO2 24.0 mmol/LBUN 17.0 mg/dLCREATININE 1.30 
mg/dLSGOT/AST 18.0 IU/LSGPT/ALT 17.0 IU/LALK PHOS 56.0 IU/LTOTAL PROTEIN 6.70 
g/dLALBUMIN 3.90 g/dLTOTAL BILI 0.40 mg/dLCALCIUM 9.80 mg/dLAGE 64 GFR NonAA 41 
GFR AA 50 eGFR 41 eGFR AA* 50 

 

                          2015 8:50 AM        WBC 5.8 RBC 3.29 HGB 10.70 g/dLHCT 34.0 %.0 fLMCH 32.50

 pgMCHC 31.50 g/dLRDW SD 52 RDW CV 13.60 %MPV 9.60 fLPLT 223 NRBC# 0.00 NRBC% 
0.0 %NEUT 69.60 %%LYMP 18.90 %%MONO 8.50 %%EOS 2.80 %%BASO 0.20 %#NEUT 4.03 
#LYMP 1.09 #MONO 0.49 #EOS 0.16 #BASO 0.01 MANUAL DIFF NOT IND Lithium 0.620 
mmol/LGLUCOSE 83.0 mg/dLSODIUM 139.0 mmol/LPOTASSIUM 3.90 mmol/LCHLORIDE 109.0 
mmol/LCO2 22.0 mmol/LBUN 19.0 mg/dLCREATININE 1.40 mg/dLSGOT/AST 19.0 
IU/LSGPT/ALT 21.0 IU/LALK PHOS 55.0 IU/LTOTAL PROTEIN 6.50 g/dLALBUMIN 3.90 
g/dLTOTAL BILI 0.50 mg/dLCALCIUM 9.60 mg/dLAGE 65 GFR NonAA 38 GFR AA 46 eGFR 38
 eGFR AA* 46 TRIGLYCERIDES 121.0 mg/dLCHOLESTEROL 192.0 mg/dLHDL 51.0 mg/dLTOT 
CHOL/HDL 3.8 .0 mg/dLFREE T4 0.79 TSH 1.210 uIU/mLHemoglobin A1c 5.40 
%Estim. Avg Glu (eAG) 108 

 

                          3/15/2016 8:08 AM         VITAMIN B12 696.0 pg/mL

 

                          3/23/2016 8:26 AM         WBC 7.0 RBC 3.61 HGB 11.80 g/dLHCT 37.70 %.0 fLMCH 32.70

 pgMCHC 31.30 g/dLRDW SD 49 RDW CV 12.50 %MPV 10.0 fLPLT 207 NRBC# 0.00 NRBC% 
0.0 %NEUT 73.60 %%LYMP 16.40 %%MONO 6.60 %%EOS 3.0 %%BASO 0.30 %#NEUT 5.15 #LYMP
 1.15 #MONO 0.46 #EOS 0.21 #BASO 0.02 MANUAL DIFF NOT IND Lithium 0.940 
mmol/LGLUCOSE 108.0 mg/dLSODIUM 143.0 mmol/LPOTASSIUM 4.30 mmol/LCHLORIDE 110.0 
mmol/LCO2 27.0 mmol/LBUN 16.0 mg/dLCREATININE 1.60 mg/dLSGOT/AST 13.0 
IU/LSGPT/ALT 7.0 IU/LALK PHOS 71.0 IU/LTOTAL PROTEIN 6.80 g/dLALBUMIN 4.0 
g/dLTOTAL BILI 0.20 mg/dLCALCIUM 10.40 mg/dLAGE 65 GFR NonAA 32 GFR AA 39 eGFR 
32 eGFR AA* 39 TRIGLYCERIDES 113.0 mg/dLCHOLESTEROL 169.0 mg/dLHDL 42.0 mg/dLTOT
 CHOL/HDL 4.0 LDL (CALC) 104.0 mg/dLFREE T4 0.86 TSH 2.20 uIU/mLHemoglobin A1c 
5.20 %Estim. Avg Glu (eAG) 103 

 

                          3/25/2016 9:17 AM         COLOR YELLOW APPEARANCE CLEAR SPEC GRAV 1.010 pH 7.0 PROTEIN 

NEGATIVE GLUCOSE NEGATIVE mg/dLKETONE NEGATIVE BILIRUBIN NEGATIVE BLOOD NEGATIVE
 NITRITE NEGATIVE [place] SCREEN SMALL MICRO IND? SEE BELOW WBC/HPF 0-5 RBC/HPF 
NEGATIVE CASTS/LPF NEGATIVE /LPFCRYSTALS NEGATIVE MUCOUS THRDS NEGATIVE BACTERIA
 NEGATIVE EPITH CELLS FEW SQUAMOUS /HPFTRICHOMONAS NEGATIVE YEAST NEGATIVE 

 

                          2016 6:00 AM        GLUCOSE 91.0 mg/dLSODIUM 143.0 mmol/LPOTASSIUM 3.60 mmol/LCHLORIDE

 112.0 mmol/LCO2 23.0 mmol/LBUN 22.0 mg/dLCREATININE 1.20 mg/dLSGOT/AST 15.0 
IU/LSGPT/ALT 12.0 IU/LALK PHOS 61.0 IU/LTOTAL PROTEIN 5.40 g/dLALBUMIN 3.10 
g/dLTOTAL BILI 0.40 mg/dLCALCIUM 8.40 mg/dLAGE 66 GFR NonAA 45 GFR AA 55 eGFR 45
 eGFR AA* 55 WBC 3.0 RBC 3.05 HGB 9.80 g/dLHCT 32.10 %.0 fLMCH 32.10 
pgMCHC 30.50 g/dLRDW SD 54 RDW CV 14.20 %MPV 10.10 fLPLT 170 NRBC# 0.00 NRBC% 
0.0 %NEUT 50.70 %%LYMP 32.60 %%MONO 10.50 %%EOS 5.90 %%BASO 0.30 %#NEUT 1.54 
#LYMP 0.99 #MONO 0.32 #EOS 0.18 #BASO 0.01 MANUAL DIFF NOT IND 

 

                          2016 2:09 PM        COLOR YELLOW APPEARANCE CLEAR SPEC GRAV 1.010 pH 5.0 PROTEIN

 30 GLUCOSE NEGATIVE mg/dLKETONE NEGATIVE BILIRUBIN NEGATIVE BLOOD LARGE NITRITE
 NEGATIVE [place]UK SCREEN MODERATE MICRO INDICATED? SEE BELOW WBC/HPF  RBC/HPF
 20-50 CASTS/LPF NEGATIVE /LPFCRYSTALS NEGATIVE MUCOUS THRDS NEGATIVE BACTERIA 
NEGATIVE EPITH CELLS FEW SQUAMOUS /HPFTRICHOMONAS NEGATIVE YEAST NEGATIVE CULT 
SET UP? YES 

 

                          1/3/2017 4:08 PM          COLOR YELLOW APPEARANCE HAZY SPEC GRAV 1.015 pH 6.0 PROTEIN 30

 GLUCOSE NEGATIVE mg/dLKETONE NEGATIVE BILIRUBIN NEGATIVE BLOOD MODERATE NITRITE
 NEGATIVE [place] SCREEN LARGE MICRO INDICATED? SEE BELOW WBC/-200 RBC/HPF 
5-10 CASTS/LPF NEGATIVE /LPFCRYSTALS NEGATIVE MUCOUS THRDS NEGATIVE BACTERIA 
NEGATIVE EPITH CELLS 1+ SQUAMOUS /HPFTRICHOMONAS NEGATIVE YEAST NEGATIVE CULT 
SET UP? YES 

 

                          2017 4:24 PM         CREATININE 1.50 mg/dLAGE 66 GFR NonAA 35 GFR AA 42 eGFR 35 eGFR

 AA* 42 VITAMIN B12 1324.0 pg/mL

 

                          2017 11:30 AM         GLUCOSE 93.0 mg/dLSODIUM 143.0 mmol/LPOTASSIUM 3.10 mmol/LCHLORIDE

 101.0 mmol/LCO2 29.0 mmol/LBUN 26.0 mg/dLCREATININE 1.50 mg/dLSGOT/AST 23.0 
IU/LSGPT/ALT 13.0 IU/LALK PHOS 66.0 IU/LTOTAL PROTEIN 7.70 g/dLALBUMIN 4.30 
g/dLTOTAL BILI 0.40 mg/dLCALCIUM 10.30 mg/dLAGE 66 GFR NonAA 35 GFR AA 42 eGFR 
35 eGFR AA* 42 TRIGLYCERIDES 147.0 mg/dLCHOLESTEROL 184.0 mg/dLHDL 44.0 mg/dLTOT
 CHOL/HDL 4.2 .0 mg/dLWBC 5.4 RBC 3.98 HGB 12.90 g/dLHCT 40.20 %.0
 fLMCH 32.40 pgMCHC 32.10 g/dLRDW SD 50 RDW CV 13.50 %MPV 9.30 fLPLT 210 NRBC# 
0.00 NRBC% 0.0 %NEUT 54.20 %%LYMP 30.70 %%MONO 9.10 %%EOS 5.20 %%BASO 0.40 
%#NEUT 2.94 #LYMP 1.66 #MONO 0.49 #EOS 0.28 #BASO 0.02 MANUAL DIFF NOT IND FREE 
T4 1.09 COLOR YELLOW APPEARANCE CLEAR SPEC GRAV <=1.005 pH 5.5 PROTEIN NEGATIVE 
GLUCOSE NEGATIVE mg/dLKETONE NEGATIVE BILIRUBIN NEGATIVE BLOOD NEGATIVE NITRITE 
NEGATIVE LEUK SCREEN LARGE MICRO INDICATED? SEE BELOW WBC/HPF 10-20 RBC/HPF 0-5 
CASTS/LPF NEGATIVE /LPFCRYSTALS NEGATIVE MUCOUS THRDS NEGATIVE BACTERIA FEW 
EPITH CELLS 1+ SQUAMOUS /HPFTRICHOMONAS NEGATIVE YEAST NEGATIVE CULT SET UP? YES
 TSH 1.820 uIU/mL

 

                          2017 2:45 PM         COLOR YELLOW APPEARANCE CLEAR SPEC GRAV <=1.005 pH 6.0 PROTEIN

 NEGATIVE GLUCOSE NEGATIVE mg/dLKETONE NEGATIVE BILIRUBIN NEGATIVE BLOOD TRACE-
INTACT NITRITE NEGATIVE LEUK SCREEN SMALL MICRO INDICATED? SEE BELOW WBC/HPF 0-5
 RBC/HPF NEGATIVE CASTS/LPF NEGATIVE /LPFCRYSTALS NEGATIVE MUCOUS THRDS NEGATIVE
 BACTERIA FEW EPITH CELLS FEW SQUAMOUS /HPFTRICHOMONAS NEGATIVE YEAST NEGATIVE 
CULT SET UP? YES 

 

                          2017 11:22 AM        COLOR YELLOW APPEARANCE CLEAR SPEC GRAV <=1.005 pH 6.5 PROTEIN

 NEGATIVE GLUCOSE NEGATIVE mg/dLKETONE NEGATIVE BILIRUBIN NEGATIVE BLOOD 
NEGATIVE NITRITE NEGATIVE LEUK SCREEN NEGATIVE MICRO INDICATED? NOT INDICATED 







History Of Immunizations







       Name    Date Admin    Mfg Name    Mfg Code    Trade Name    Lot#    Route    Inj    Vis Given    Vis

 Pub                                    CVX

 

        Influenza    2008    Not Entered    NE      Not Entered            Not Entered    Not Entered

                    1            999

 

        X       12/19/2008    [employer]    MSD     Pneumovax 23            Intramuscular    Not Entered

                    1            999

 

           Influenza    10/15/2010    [employer]    NOV        Fluvirin > 12 Years    

[id number]     Intramuscular    Left Deltoid    10/15/2010    2009    999

 

          X         3/23/2015    [facility]    [facility]       Prevnar 13    [id number]    Intramuscular

                Right Gluteous Medius    3/23/2015       2013       109







History of Past Illness







                    Name                Date of Onset       Comments

 

                    Peritoneal Neoplasm, Malignant                         

 

                    Hyperlipidemia                           

 

                    Bipolar disorder, unspecified                         

 

                    Artificial opening status; colostomy                         

 

                    B12 deficiency                           

 

                    Anemia, Pernicious                         

 

                    Arthritis unspecified                         

 

                    cervical cancer                          

 

                    Artificial opening status; colostomy    2010  1:10PM     

 

                    Bipolar disorder, unspecified    2010  1:10PM     

 

                    Hyperlipidemia      2010  1:10PM     

 

                    Anemia, Pernicious    2010  1:10PM     

 

                    Postoperative Follow-Up    2010  1:55PM     

 

                    Postoperative Follow-Up    Mar  8 2010 10:57AM     

 

                    Artificial opening status; colostomy    Mar  8 2010  1:19PM     

 

                    Peritoneal Neoplasm, Malignant    Mar  8 2010  1:19PM     

 

                    Artificial opening status; colostomy    2010  1:40PM     

 

                    Hyperlipidemia      2010  1:40PM     

 

                    Anemia, Pernicious    2010  1:40PM     

 

                    Peritoneal Neoplasm, Malignant    2010  1:40PM     

 

                    Arthritis unspecified    2010  1:40PM     

 

                    Anemia of Chronic Illness    2010  1:40PM     

 

                    Tinea corporis      2010  3:17PM     

 

                    Bipolar disorder, unspecified    2010  1:33PM     

 

                    Hyperlipidemia      2010  1:33PM     

 

                    Anemia, Pernicious    2010  1:33PM     

 

                    Peritoneal Neoplasm, Malignant    2010  1:33PM     

 

                    B12 deficiency      2010  1:33PM     

 

                    Ethmoidal Sinusitis, Acute    Sep 21 2010  3:53PM     

 

                    Wheezing            Sep 21 2010  3:53PM     

 

                    Flu                 Oct 15 2010  1:40PM     

 

                    Bipolar disorder, unspecified    Oct 15 2010  1:42PM     

 

                    Hyperlipidemia      Oct 15 2010  1:42PM     

 

                    Anemia, Pernicious    Oct 15 2010  1:42PM     

 

                    Peritoneal Neoplasm, Malignant    Oct 15 2010  1:42PM     

 

                    Bipolar disorder, unspecified    2011 12:01PM     

 

                    Hyperlipidemia      2011 12:01PM     

 

                    Anemia, Pernicious    2011 12:01PM     

 

                    Peritoneal Neoplasm, Malignant    2011 12:01PM     

 

                    Bipolar disorder, unspecified    Apr 15 2011 10:55AM     

 

                    Major Depression    2011 10:11AM     

 

                    Bipolar Disorder    2011 10:11AM     

 

                    Cancer              May 10 2011  4:16PM     

 

                    Major Depression    May 10 2011  3:16PM     

 

                    Bipolar Disorder    May 10 2011  3:16PM     

 

                    Hypercalcemia       May 23 2011  2:47PM     

 

                    Bipolar disorder, unspecified    May 23 2011  2:47PM     

 

                    Colon Cancer, Personal History    May 23 2011  2:47PM     

 

                    Bipolar Disorder    May 31 2011  4:39PM     

 

                    Depressive Disorder    2011 10:01AM     

 

                    Vitamin B12 deficiency    2011 10:01AM     

 

                    Vitamin D Deficiency    2011  5:07PM     

 

                    Anemia, Vitamin B12 Deficiency    2011  5:07PM     

 

                    B12 deficiency      2011  3:56PM     

 

                    Routine gynecological examination    Aug  4 2011  9:08AM     

 

                    Screening Examination for Breast Cancer    Aug  4 2011  9:08AM     

 

                    Tinea Corporis      Aug  4 2011  9:08AM     

 

                    Depressive Disorder    Sep 23 2011  8:47AM     

 

                    Contact Dermatitis    Sep 23 2011  8:47AM     

 

                    Anemia, Pernicious    Sep 23 2011  8:47AM     

 

                    B12 deficiency      Sep 23 2011  8:47AM     

 

                    B12 deficiency      Sep 27 2011  2:58PM     

 

                    B12 deficiency      Oct 20 2011  2:34PM     

 

                    Flu                 Dec  9 2011  3:16PM     

 

                    B12 deficiency      Dec  9 2011  3:17PM     

 

                    B12 deficiency      2012  4:52PM     

 

                    B12 deficiency      Fe 23 2012 11:10AM     

 

                    B12 deficiency      2012  3:37PM     

 

                    B12 deficiency      May  3 2012  4:10PM     

 

                    B12 deficiency      2012  2:54PM     

 

                    B12 deficiency      2012 11:23AM     

 

                    B12 deficiency      Aug  9 2012  2:08PM     

 

                    B12 deficiency      Sep  6 2012  4:36PM     

 

                    B12 deficiency      Oct 16 2012 10:23AM     

 

                    Flu                 2013  3:11PM     

 

                    Bipolar disorder, unspecified    2013  2:48PM     

 

                    Anemia, Pernicious    2013  2:48PM     

 

                    B12 deficiency      Feb  2013  2:48PM     

 

                    Extrapyramidal abnormal movement disorder    2013  2:48PM     

 

                    B12 deficiency      Apr  3 2013 12:03PM     

 

                    Bipolar disorder, unspecified    May  7 2013  1:31PM     

 

                    Anemia, Pernicious    May  7 2013  1:31PM     

 

                    B12 deficiency      May  7 2013  1:31PM     

 

                    Extrapyramidal abnormal movement disorder    May  7 2013  1:31PM     

 

                    B12 deficiency      2013  3:42PM     

 

                    B12 deficiency      2013  1:31PM     

 

                    Hyperlipidemia      Aug  7 2013 10:37AM     

 

                    Vitamin D Deficiency    Aug  7 2013 10:37AM     

 

                    Bipolar disorder, unspecified    Aug  7 2013 10:37AM     

 

                    Anemia, Pernicious    Aug  7 2013 10:37AM     

 

                    B12 deficiency      Aug  7 2013 10:37AM     

 

                    B12 deficiency      Sep 25 2013 11:15AM     

 

                    B12 deficiency      Dec 11 2013  3:16PM     

 

                    B12 deficiency      Mar  6 2014  1:48PM     

 

                    B12 deficiency      May 21 2014  3:17PM     

 

                    Screening Examination for Breast Cancer    2014  3:23PM     

 

                    Periumbilical abdominal pain    2014  3:23PM     

 

                    B12 deficiency      Jul 10 2014  2:52PM     

 

                    Anemia, Vitamin B12 Deficiency    Aug 13 2014  4:50PM     

 

                    Bipolar disorder    Oct 16 2014 11:13AM     

 

                    Hyperlipidemia      Oct 16 2014 11:13AM     

 

                    Anemia, Pernicious    Oct 16 2014 11:13AM     

 

                    Peritoneal Neoplasm, Malignant    Oct 16 2014 11:13AM     

 

                    Screening breast examination    Oct 16 2014 11:13AM     

 

                    Weight loss         Oct 16 2014 11:13AM     

 

                    Anemia, Pernicious    Mar 23 2015  2:57PM     

 

                    B12 deficiency      Mar 23 2015  2:57PM     

 

                    Need for Prevnar vaccine    Mar 23 2015  2:57PM     

 

                    Bipolar disorder    Mar 23 2015  2:57PM     

 

                    Hyperlipidemia      Mar 23 2015  2:57PM     

 

                    Anemia, Pernicious    Mar 23 2015  2:57PM     

 

                    Peritoneal Neoplasm, Malignant    Mar 23 2015  2:57PM     

 

                    B12 deficiency      May  4 2015  4:48PM     

 

                    Hyperlipidemia      May 13 2015  9:56AM     

 

                    Anemia              May 13 2015  9:56AM     

 

                    Bipolar disorder    May 13 2015  9:56AM     

 

                    Bipolar disorder    May 14 2015  3:27PM     

 

                    Hyperlipidemia      May 14 2015  3:27PM     

 

                    Anemia, Pernicious    May 14 2015  3:27PM     

 

                    Peritoneal Neoplasm, Malignant    May 14 2015  3:27PM     

 

                    B12 deficiency      2015  2:20PM     

 

                    B12 deficiency      2015 11:34AM     

 

                    B12 deficiency      Aug 18 2015  9:06AM     

 

                    Tinea Corporis      Sep 18 2015  8:54AM     

 

                    B12 deficiency      Sep 18 2015  8:54AM     

 

                    B12 deficiency      2015 10:28AM     

 

                    Herpes zoster without complication    Dec  3 2015  9:52AM     

 

                    B12 deficiency      Dec 23 2015 11:21AM     

 

                    B12 deficiency      2016  4:51PM     

 

                    Vitamin B 12 deficiency    Mar 14 2016  5:35PM     

 

                    B12 deficiency      Mar 15 2016 12:14PM     

 

                    B12 deficiency      May  5 2016 11:30AM     

 

                    Edema               May  5 2016 11:30AM     

 

                    Dermatitis          May  5 2016 11:30AM     

 

                    Edema               May 17 2016  8:38AM     

 

                    Shortness of breath    May 17 2016  8:38AM     

 

                    Bilateral edema of lower extremity    2016  2:06PM     

 

                    B12 deficiency      2016  2:06PM     

 

                    B12 deficiency      2016 11:50AM     

 

                    B12 deficiency      2016 11:20AM     

 

                    Diarrhea            Aug  2 2016  3:13PM     

 

                    B12 deficiency      Aug 24 2016 11:10AM     

 

                    Encounter for screening mammogram for breast cancer    Aug 24 2016 11:44AM     

 

                    B12 deficiency      Sep 28 2016  2:35PM     

 

                    B12 deficiency      Dec 15 2016  2:02PM     

 

                    Dysuria             Dec 29 2016 12:14PM     

 

                    Hematuria           Fidencio  3 2017  1:33PM     

 

                    Constipation by delayed colonic transit    2017  1:52PM     

 

                    Ileus               2017  1:52PM     

 

                    UTI (urinary tract infection)    Fidencio 15 2017  3:39PM     

 

                    Acute cystitis with hematuria    2017 11:07AM     

 

                    B12 deficiency      2017 11:07AM     

 

                    B12 deficiency      2017 11:40AM     

 

                    B12 deficiency      2017  4:07PM     

 

                    Slurred speech      2017  3:07PM     

 

                    Vitamin B12 deficiency    2017  3:07PM     

 

                    Dysphagia, unspecified type    2017  3:07PM     

 

                    Hyperlipidemia      2017  3:07PM     

 

                    Dysuria             2017 12:01PM     

 

                    B12 deficiency      2017  2:08PM     

 

                    Dysuria             2017 10:58AM     







Payers







           Insurance Name    Company Name    Plan Name    Plan Number    Policy Number    Policy Group

 Number                                 Start Date

 

                    Medicare RHC Medicare RHC              [id number]              N/A

 

                          [employer]    [employer]                 [id number]

                                                    

 

                    Medicare Part A    Medicare - Lab/Xray              [id number]              2006

 

                    Medicare Part B    Medicare [place]              [id number]              2006

 

                          [employer]    [employer]                 50 percent

                                                    2009

 

                    [facility]              [id number]              N/A

 

                    Medicare Part A    Medicare Part A              [id number]              N/A

 

                    Medicare Part A    Medicare Part A              [id number]              2006









History of Encounters







                    Visit Date          Visit Type          Provider

 

                    2017           Nurse visit         Bhupinder Aspen DO

 

                    2017           Office visit         

 

                    2017           Office visit        Bhupinder Aspen DO

 

                    2017           Nurse visit         Bhupinder Aspen DO

 

                    2017           Office visit        Radha Ontiveros APRN

 

                    1/15/2017           Office visit        Aj Tapia NP

 

                    2017            Office visit        Devin Masterson MD

 

                    2016          [facility]            Devin Masterson MD

 

                    12/15/2016          Nurse visit         Bhupinder Aspen DO

 

                    2016           Nurse visit         Bret Forte APRN

 

                    2016           Nurse visit         hBupinder Aspen DO

 

                    2016            Office visit        Bhupinder Aspen DO

 

                    2016           Nurse visit         Bhupinder Aspen DO

 

                    2016           Office visit        Bret Forte APRN

 

                    2016            Office visit        Bhupinder Aspen DO

 

                    3/15/2016           Nurse visit         Bhupinder Aspen DO

 

                    2016            Nurse visit         Bhupinder Aspen DO

 

                    2015          Nurse visit         Bhupinder Aspen DO

 

                    12/3/2015           Office visit        Bhupinder Aspen DO

 

                    2015          Nurse visit         Bhupinder Aspen DO

 

                    2015           Office visit        Bhupinder Aspen DO

 

                    2015           Nurse visit         Bhupinder Aspen DO

 

                    2015            Nurse visit         Bhupinder Aspen DO

 

                    2015            Nurse visit         Bhupinder Aspen DO

 

                    2015           Office visit        Bhupinder Aspen DO

 

                    2015            Nurse visit         Bhupinder Aspen DO

 

                    3/23/2015           Office visit        Bhupinder Aspen DO

 

                    10/16/2014          Office visit        Bhupinder Aspen DO

 

                    2014           Nurse visit         Radha Ontiveros APRN

 

                    7/10/2014           Nurse visit         Bhupinder Aspen DO

 

                    2014           Office visit        Bhupinder Nealte DO

 

                    2014           Nurse visit         Bhupinder Aspen DO

 

                    3/6/2014            Nurse visit         Bhupinder Aspen DO

 

                    2014            Yasmine Lopez MD

 

                    2013          Nurse visit         Bhupinder Aspen DO

 

                    2013           Nurse visit         Bhupinder Aspen DO

 

                    2013            Office visit        Bhupinder Aspen DO

 

                    2013            Nurse visit         Bhupinder Aspen DO

 

                    2013            Nurse visit         Bhupinder Aspen DO

 

                    2013            Office visit        Bhupinder Aspen DO

 

                    4/3/2013            Nurse visit         Bhupinder Aspen DO

 

                    2013            Office visit        Bhupinder Aspen DO

 

                    10/16/2012          Nurse visit         Bhupinder Aspen DO

 

                    2012            Nurse visit         Bhupinder Aspen DO

 

                    2012            Voided              Bhupinder Aspen DO

 

                    2012            Nurse visit         Bhupinder Aspen DO

 

                    2012            Nurse visit         Bhupinder Aspen DO

 

                    2012           Nurse visit         Bhupinder Aspen DO

 

                    5/3/2012            Nurse visit         Bhupinder Aspen DO

 

                    2012           Nurse visit         Bhupinder Aspen DO

 

                    2012           Nurse visit         Bhupinder Aspen DO

 

                    2012           Nurse visit         Bhupinder Aspen DO

 

                    2011           Nurse visit         Bhupinder Aspen DO

 

                    10/20/2011          Nurse visit         Bhupinder Aspen DO

 

                    2011           Office visit        Bhupinder Aspen DO

 

                    2011           Nurse visit         Radha GREEN

 

                    2011            Office visit        Bhupinder Aspen DO

 

                    2011           Nurse visit         Bhupinder Aspen DO

 

                    2011            Office visit        Bhupinder Aspen DO

 

                    2011           Office visit        Bhupinder Aspen DO

 

                    5/10/2011           Office visit        Bhupinder Aspen DO

 

                    2011           Office visit        Bhupinder Aspen DO

 

                    4/15/2011           Office visit        Devin Angel DO

 

                    2011           Office visit        Devin Angel DO

 

                    10/15/2010          Office visit        Devin Angel DO

 

                    2010           Office visit        Devin Angel DO

 

                    2010            Office visit        Devin Angel DO

 

                    2010           Office visit        Devin Angel DO

 

                    2010            Office visit        Devin Angel DO

 

                    3/8/2010            Office visit        Devin Masterson MD

 

                    2010            Surgery             Devin Masterson MD

 

                    2010            Office visit        Devin Angel DO

 

                    2010           Surgery             Devin Masterson MD

 

                    2010           [facility]            Devin Masterson MD

 

                    2010           [facility]            Devin Masterson MD

 

                    10/22/2009          Office visit        Devin Angel DO

## 2019-06-26 NOTE — XMS REPORT
MU2 Ambulatory Summary

                             Created on: 2016



Pauline Gan

External Reference #: [id number]

: 1950

Sex: Female



Demographics







                          Address                   [address]

[place]  [id number]

 

                          Home Phone                [phone]

 

                          Preferred Language        English

 

                          Marital Status            Legally 

 

                          [employer]ar[employer] Affiliation     Unknown

 

                          Race                      White

 

                          Ethnic Group              Not  or 





Author







                          Author                    Bhupinder Louise

 

                          [facility]

 

                          Address                   [address]

[place]  [id number]



 

                          Phone                     [phone]







Care Team Providers







                    Care Team Member Name    Role                Phone

 

                    Bhupinder Louise    PCP                 Unavailable







Allergies and Adverse Reactions







                    Name                Reaction            Notes

 

                    NO KNOWN DRUG ALLERGIES                         







Plan of Treatment







             Planned Activity    Comments     Planned Date    Planned Time    Plan/Goal

 

             VITAMIN B-12                 3/14/2016    12:00 AM      

 

             COMPLETE CBC W/AUTO DIFF WBC                 2013     12:00 AM      

 

             ASSAY OF LITHIUM                 2013     12:00 AM      

 

             METABOLIC PANEL TOTAL CA                 2013     12:00 AM      

 

             VITAMIN B-12                 2013     12:00 AM      

 

             ASSAY OF FOLIC ACID SERUM                 2013     12:00 AM      

 

             THER/PROPH/DIAG INJ SC/IM                 2013    12:00 AM      







Medications







                                        Active 

 

             Name         Start Date    Estimated Completion Date    SIG          Comments

 

                    syringe with needle (disp) 1 mL 25 gauge x 1" miscellaneous syringe    2011             

                          use for injection once a month     

 

                Latuda 20 mg oral tablet                                    take 1 tablet (20 mg) by oral route once daily with

 food (at least 350 calories)            

 

             pravastatin 40 mg oral tablet    3/30/2015                 TAKE 1 TABLET BY MOUTH DAILY     

 

                Namenda XR 28 mg oral capsule,sprinkle,ER 24hr    2015                       take 1 capsule (28

 mg) by oral route once daily            

 

                prednisone 10 mg oral tablet    12/3/2015                       take 2 tablets (20 mg) by oral route

 once daily for 4 days 1 tablet daily for 4 days 0.5 tablet daily for 4 days     









                                         

 

             Name         Start Date    Expiration Date    SIG          Comments

 

             Reglan 10mg    3/29/2010    2010    one ac and hs     

 

                Keflex 500 mg oral capsule    2010       10/1/2010       take 1 capsule (500 mg) by oral

 route every 6 hours for 10 days         

 

                Bactrim -160 mg oral tablet    2011       take 1 tablet by oral route

 every 12 hours for 7 days               

 

                triamcinolone acetonide 0.1 % topical cream    2011      apply a thin

 layer to the affected area(s) by topical route 2 times per day     

 

                sertraline 100 mg oral tablet    4/10/2012       5/10/2012       take 1.5 tablets by oral route

 daily for 30 days                       

 

                ergocalciferol (vitamin D2) 50,000 unit oral capsule    4/15/2013       2013       TAKE

 ONE CAPSULE BY MOUTH ONCE A WEEK        

 

                CYANOCOBALAM 1000MCGINJ 1000 milliliter    2013       INJECT 1ML INTRAMUSCULAR

 ONCE A MONTH                            

 

                pravastatin 40 mg oral tablet    3/25/2014       3/20/2015       TAKE ONE TABLET BY MOUTH EVERY

 DAY                                     

 

                          Zostavax (PF) 19,400 unit/0.65 mL subcutaneous suspension for reconstitution    3/23/2015

                    3/24/2015           inject 0.65 milliliter by subcutaneous route once     

 

                lithium carbonate 300 mg oral capsule    2015       take 1 capsule (300

 mg) by oral route 2 for 30 days         

 

                famciclovir 500 mg oral tablet    12/3/2015       12/10/2015      take 1 tablet (500 mg) by

 oral route every 8 hours for 7 days     









                                        Discontinued 

 

             Name         Start Date    Discontinued Date    SIG          Comments

 

                Tylenol 325 mg oral tablet                    2013        take 1 - 2 tablets (325 -650 mg) by oral

 route every 4-6 hours as needed         

 

                Calcium 600 + D(3) 600 mg(1,500mg) -400 unit oral tablet                    2011       take 1 tablet

 by oral route 2 times a day            no longer taking

 

                Vitamin B-12 1,000 mcg oral tablet extended release    2010       take 1

 tablet by oral route daily             no longer taking

 

                Antifungal (clotrimazole) 1 % topical cream    2010       apply to the 

affected and surrounding areas of skin by topical route 2 times per day morning 
and evening                              

 

                sertraline 100 mg oral tablet    5/10/2011       2011       take 2 tablets (200 mg) by 

oral route once daily                   discontinued by Dr. Serrano

 

                mirtazapine 15 mg oral tablet                    2011        take 1 tablet (15 mg) by oral route 

once daily before bedtime               Dr. Serrano

 

                mirtazapine 15 mg oral tablet                    2011        take 1 tablet (15 mg) by oral route 

once daily before bedtime               jose manuel'iker by Dr. Serrano

 

                Pristiq 50 mg oral tablet extended release 24 hr                    2013        take 1 tablet (50

 mg) by oral route once daily           Dr. Serrano

 

                Pristiq 50 mg oral tablet extended release 24 hr                    2013        take 1 tablet (50

 mg) by oral route once daily           dose updated

 

                Vitamin B-12 1,000 mcg/mL injection solution    2011        inject 1 milliliter

 (1,000 mcg) by intramuscular route once a month    on list already

 

                clotrimazole 1 % topical cream    2011        apply to the affected and surrounding

 areas of skin by topical route 2 times per day in the morning and evening     

 

                Vitamin D2 50,000 unit oral capsule    2011        take 1 capsule (50,000

 unit) by oral route once weekly        generic on list

 

                Pravachol 40 mg oral tablet    2012        take 1 tablet (40 mg) by oral 

route once daily for 90 days            generic on list

 

                lithium carbonate 300 mg oral capsule    2012        take 1 capsule by oral

 route daily                            dose updated

 

                Pristiq 100 mg oral tablet extended release 24 hr                    4/10/2012       take 1 and 1/2 

tablet (150 mg) by oral route once daily    Mental Health provider

 

                Pristiq 100 mg oral tablet extended release 24 hr                    4/10/2012       take 1 and 1/2 

tablet (150 mg) by oral route once daily    Discontinued by Dr Efrain Knight at [facility]

 

                hydroxyzine HCl 50 mg oral tablet    10/16/2014      2015       take 1 tablet (50 mg) 

by oral route at bedtime                 

 

                fluconazole 100 mg oral tablet    2015       12/3/2015       take 1 tablet (100 mg) by 

oral route once a week                   

 

                ketoconazole 2 % topical cream    2015       12/3/2015       apply to the affected area(s)

 by topical route 2 times per day        







Problem List







                    Description         Status              Onset

 

                    Artificial opening status; colostomy    Active               

 

                    Bipolar disorder, unspecified    Active               

 

                    Hyperlipidemia      Active               

 

                    Peritoneal Neoplasm, Malignant    Active               

 

                    Anemia, Pernicious    Active               

 

                    Arthritis unspecified    Active               

 

                    B12 deficiency      Active               







Vital Signs







      Date    Time    BP-Sys(mm[Hg]    BP-Lynn(mm[Hg])    HR(bpm)    RR(rpm)    Temp    WT    HT    HC    BMI

                    BSA                 BMI Percentile      O2 Sat(%)

 

        12/3/2015    9:50:00 AM    132 mmHg    70 mmHg    62 bpm    16 rpm    97.9 F    145 lbs    69 in

                          21.41 kg/m2    1.79 m2                   100 %

 

        2015    8:52:00 AM    132 mmHg    68 mmHg    52 bpm    20 rpm    97.8 F    141 lbs    69 in

                          20.8218 kg/m    1.7645 m                 100 %

 

        2015    3:25:00 PM    120 mmHg    62 mmHg    72 bpm    16 rpm    98.1 F    136 lbs    69 in

                          20.08 kg/m2    1.73 m2                   98 %

 

       3/23/2015    2:55:00 PM    130 mmHg    76 mmHg    68 bpm    18 rpm    97 F    140 lbs    69 in    

                20.6742 kg/m    1.7583 m                      98 %

 

        10/16/2014    11:11:00 AM    120 mmHg    66 mmHg    77 bpm    20 rpm    98 F    130 lbs    69 in

                          19.20 kg/m2    1.69 m2                   100 %

 

        2014    3:21:00 PM    130 mmHg    66 mmHg    63 bpm    18 rpm    97.2 F    160 lbs    69 in

                          23.6276 kg/m    1.8797 m                 99 %

 

        2013    10:35:00 AM    132 mmHg    70 mmHg    66 bpm    20 rpm    98.1 F    157 lbs    69 in

                          23.18 kg/m2    1.86 m2                    

 

        2013    1:29:00 PM    132 mmHg    70 mmHg    76 bpm    18 rpm    98.2 F    166 lbs    69 in 

                          24.5137 kg/m    1.9146 m                  

 

       2013    2:46:00 PM    128 mmHg    70 mmHg    76 bpm    16 rpm    98 F    160 lbs    69 in     

                23.63 kg/m2     1.88 m2                          

 

        2011    8:49:00 AM    128 mmHg    78 mmHg    70 bpm    18 rpm    97.9 F    164 lbs    69 in

                          24.2183 kg/m    1.903 m                  

 

     2011    1:31:00 PM    132 mmHg    68 mmHg    84 bpm         97 F    167 lbs                        

                                         

 

        2011    9:09:00 AM    128 mmHg    70 mmHg    72 bpm    18 rpm    98.2 F    163 lbs    64 in 

                          27.9786 kg/m    1.8272 m                  

 

       2011    10:01:00 AM    132 mmHg    70 mmHg    72 bpm    18 rpm    98.2 F    154 lbs             

                                                                 

 

       2011    2:47:00 PM    128 mmHg    70 mmHg    72 bpm    18 rpm    97.8 F    156 lbs             

                                                                 

 

       5/10/2011    3:16:00 PM    144 mmHg    80 mmHg    72 bpm    18 rpm    98.2 F    158 lbs             

                                                                 

 

        2011    10:11:00 AM    132 mmHg    70 mmHg    70 bpm    18 rpm    98.2 F    168 lbs    69 in

                          24.809 kg/m    1.9261 m                  

 

        4/15/2011    10:52:00 AM    110 mmHg    60 mmHg    75 bpm    16 rpm    97.5 F    172.375 lbs    

69 in                     25.46 kg/m2    1.95 m2                   100 %

 

        2011    11:43:00 AM    120 mmHg    82 mmHg    75 bpm    16 rpm    97.2 F    178.5 lbs    69

 in                       26.3596 kg/m    1.9854 m                 100 %

 

        10/15/2010    1:32:00 PM    120 mmHg    70 mmHg    80 bpm    18 rpm    96.6 F    177 lbs    69 in

                          26.14 kg/m2    1.98 m2                   100 %

 

        2010    3:50:00 PM    168 mmHg    100 mmHg    82 bpm    18 rpm    97.8 F    177.5 lbs    69

 in                       26.2119 kg/m    1.9798 m                 97 %

 

        2010    1:21:00 PM    140 mmHg    80 mmHg    59 bpm    16 rpm    97.6 F    173.25 lbs    69 

in                        25.58 kg/m2    1.96 m2                   100 %

 

        2010    3:02:00 PM    140 mmHg    80 mmHg    61 bpm    16 rpm    97.6 F    173.125 lbs    69

 in                       25.5658 kg/m    1.9553 m                 99 %

 

        2010    1:23:00 PM    130 mmHg    80 mmHg    66 bpm    16 rpm    96.8 F    173 lbs    69 in 

                          25.55 kg/m2    1.95 m2                   100 %

 

        2010    12:58:00 PM    130 mmHg    88 mmHg    75 bpm    16 rpm    98.4 F    172.25 lbs    69

 in                       25.4366 kg/m    1.9503 m                 100 %







Social History







                    Name                Description         Comments

 

                    denies alcohol use                         

 

                    denies smoking                           

 

                    Denies illicit substance abuse                         

 

                    retired                                 direct care

 

                    Single                                   

 

                    Exercises regularly                         

 

                    Attended some college                         

 

                    Tobacco             Never smoker         







History of Procedures







                    Date Ordered        Description         Order Status

 

                    2010 12:00 AM    COMPREHEN METABOLIC PANEL    Reviewed

 

                    2010 12:00 AM    COMPLETE CBC W/AUTO DIFF WBC    Reviewed

 

                    2010 12:00 AM    LIPID PANEL         Reviewed

 

                          2015 12:00 AM        B12 Injection, Up to 1000 Mcg NDC#8965-3507-01 [facility] 

                                        Reviewed

 

                    2011 12:00 AM    MAMMOGRAM SCREENING    Reviewed

 

                    2011 12:00 AM    CYTOPATH C/V THIN LAYER    Reviewed

 

                    2011 12:00 AM    B12 Injection 1 cc NDC#[address]-67    Reviewed

 

                    2015 12:00 AM    THER/PROPH/DIAG INJ SC/IM    Reviewed

 

                    2015 12:00 AM    B12 Injection, Up to 1000 Mcg NDC#7510-5280-60    Reviewed

 

                    2011 12:00 AM    THER/PROPH/DIAG INJ SC/IM    Reviewed

 

                    2011 12:00 AM    B12 Injection([place]) Ndc#1819-1365-64-    Reviewed

 

                    2015 12:00 AM    THER/PROPH/DIAG INJ SC/IM    Reviewed

 

                    2015 12:00 AM    B12 Injection, Up to 1000 Mcg NDC#8282-9989-07    Reviewed

 

                    10/20/2011 12:00 AM    THER/PROPH/DIAG INJ SC/IM    Reviewed

 

                    10/20/2011 12:00 AM    B12 Injection([place]) Ndc#9095-7601-34-    Reviewed

 

                    2016 12:00 AM    THER/PROPH/DIAG INJ SC/IM    Reviewed

 

                    2016 12:00 AM    B12 Injection, Up to 1000 Mcg NDC#2620-3254-40    Reviewed

 

                    2011 12:00 AM    ***Immunization administration, Medicare flu    Reviewed

 

                    2011 12:00 AM    Fluzone ** MEDICARE Only **    Reviewed

 

                    2011 12:00 AM    THER/PROPH/DIAG INJ SC/IM    Reviewed

 

                    2011 12:00 AM    B12 Injection ([facility]) Ndc#3191-1188-74    Reviewed

 

                    2012 12:00 AM    THER/PROPH/DIAG INJ SC/IM    Reviewed

 

                    2012 12:00 AM    B12 Injection ([facility]) Ndc#9717-1145-18    Reviewed

 

                    2012 12:00 AM    THER/PROPH/DIAG INJ SC/IM    Reviewed

 

                    2012 12:00 AM    B12 Injection([place]) Ndc#2631-2962-64-    Reviewed

 

                    5/3/2012 12:00 AM    THER/PROPH/DIAG INJ SC/IM    Reviewed

 

                    5/3/2012 12:00 AM    B12 Injection([place]) Ndc#9643-6697-71-    Reviewed

 

                    2012 12:00 AM    IMMUNOTHERAPY INJECTIONS    Reviewed

 

                    2012 12:00 AM    B12 Injection([place]) Ndc#0741-3466-51-    Reviewed

 

                    2012 12:00 AM    THER/PROPH/DIAG INJ SC/IM    Reviewed

 

                    2012 12:00 AM    B12 Injection, Up to 1000 Mcg NDC#1214-0448-74    Reviewed

 

                    2012 12:00 AM    THER/PROPH/DIAG INJ SC/IM    Reviewed

 

                    2012 12:00 AM    B12 Injection, Up to 1000 Mcg NDC#0510-5956-71    Reviewed

 

                    2012 12:00 AM    THER/PROPH/DIAG INJ SC/IM    Reviewed

 

                    2012 12:00 AM    B12 Injection, Up to 1000 Mcg NDC#4104-5954-16    Reviewed

 

                    10/16/2012 12:00 AM    THER/PROPH/DIAG INJ SC/IM    Reviewed

 

                    10/16/2012 12:00 AM    B12 Injection, Up to 1000 Mcg NDC#3928-8696-15    Reviewed

 

                    2010 12:00 AM    COMPREHEN METABOLIC PANEL    Reviewed

 

                    2010 12:00 AM    COMPLETE CBC W/AUTO DIFF WBC    Reviewed

 

                    2010 12:00 AM    LIPID PANEL         Reviewed

 

                    2013 12:00 AM    Flu Injection 3 Years And Above NDC# [address]-16  [facility]    Reviewed



 

                    2013 12:00 AM    COMPLETE CBC W/AUTO DIFF WBC    Reviewed

 

                    2013 12:00 AM    ASSAY OF LITHIUM    Reviewed

 

                    2013 12:00 AM    METABOLIC PANEL TOTAL CA    Reviewed

 

                    4/3/2013 12:00 AM    THER/PROPH/DIAG INJ SC/IM    Reviewed

 

                    4/3/2013 12:00 AM    B12 Injection, Up to 1000 Mcg NDC#6878-6014-37    Reviewed

 

                    2013 12:00 AM    THER/PROPH/DIAG INJ SC/IM    Reviewed

 

                    2013 12:00 AM    B12 Injection, Up to 1000 Mcg NDC#1901-6995-94    Reviewed

 

                    2013 12:00 AM    THER/PROPH/DIAG INJ SC/IM    Reviewed

 

                    2013 12:00 AM    B12 Injection, Up to 1000 Mcg NDC#9927-8812-07    Reviewed

 

                    2013 12:00 AM    LIPID PANEL         Reviewed

 

                    2013 12:00 AM    VITAMIN D 25 HYDROXY    Reviewed

 

                    2013 12:00 AM    THER/PROPH/DIAG INJ SC/IM    Reviewed

 

                    3/6/2014 12:00 AM    THER/PROPH/DIAG INJ SC/IM    Reviewed

 

                    2014 12:00 AM    THER/PROPH/DIAG INJ SC/IM    Reviewed

 

                    2014 12:00 AM    B12 Injection, Up to 1000 Mcg NDC#3609-1167-97    Reviewed

 

                    2010 12:00 AM    SKIN FUNGI CULTURE    Reviewed

 

                    10/9/2010 12:00 AM    COMPREHEN METABOLIC PANEL    Reviewed

 

                    10/9/2010 12:00 AM    LIPID PANEL         Reviewed

 

                    2010 12:00 AM    THER/PROPH/DIAG INJ SC/IM    Reviewed

 

                    2010 12:00 AM    B12 Injection Ndc#[address]-12 ([place])    Reviewed

 

                    2010 12:00 AM    THER/PROPH/DIAG INJ SC/IM    Reviewed

 

                    2010 12:00 AM    Kenalog 40 Mg Im-Ndc#[address]-62 ([place])    Reviewed

 

                    10/15/2010 12:00 AM    FLU VACCINE 3 YRS & > IM    Reviewed

 

                    10/15/2010 12:00 AM    Admin.Of M/C Cov.Vaccine-Flu Vacc.    Reviewed

 

                    1/15/2011 12:00 AM    COMPLETE CBC W/AUTO DIFF WBC    Reviewed

 

                    1/15/2011 12:00 AM    COMPREHEN METABOLIC PANEL    Reviewed

 

                    1/15/2011 12:00 AM    LIPID PANEL         Reviewed

 

                    2014 12:00 AM    MAMMOGRAM SCREENING    Reviewed

 

                    2014 12:00 AM    Screening mammography, bilateral    Reviewed

 

                    7/10/2014 12:00 AM    THER/PROPH/DIAG INJ SC/IM    Reviewed

 

                    7/10/2014 12:00 AM    B12 Injection, Up to 1000 Mcg NDC#8724-2734-32    Reviewed

 

                    2011 12:00 AM    COMPLETE CBC W/AUTO DIFF WBC    Reviewed

 

                    2011 12:00 AM    COMPREHEN METABOLIC PANEL    Reviewed

 

                    2011 12:00 AM    LIPID PANEL         Reviewed

 

                    2014 12:00 AM    B12 Injection, Up to 1000 Mcg NDC#8895-5231-64    Reviewed

 

                    10/19/2014 12:00 AM    MAMMOGRAM SCREENING    Reviewed

 

                    10/19/2014 12:00 AM    Screening mammography, bilateral    Reviewed

 

                    10/16/2014 12:00 AM    COMPLETE CBC W/AUTO DIFF WBC    Reviewed

 

                    10/16/2014 12:00 AM    COMPREHEN METABOLIC PANEL    Reviewed

 

                    10/16/2014 12:00 AM    IMMUNOASSAY TUMOR     Reviewed

 

                    10/16/2014 12:00 AM    LIPID PANEL         Reviewed

 

                    10/16/2014 12:00 AM    ASSAY OF LITHIUM    Reviewed

 

                    10/16/2014 12:00 AM    MAMMOGRAM SCREENING    Reviewed

 

                    2011 12:00 AM    ASSAY OF PARATHORMONE    Reviewed

 

                    2011 12:00 AM    VITAMIN D 25 HYDROXY    Reviewed

 

                    2011 12:00 AM    ASSAY OF LITHIUM    Reviewed

 

                    2011 12:00 AM    METABOLIC PANEL TOTAL CA    Reviewed

 

                    2011 12:00 AM    CT HEAD/BRAIN W/O & W/DYE    Reviewed

 

                    3/23/2015 12:00 AM    PNEUMOCOCCAL VACC 13 LGENDY IM    Reviewed

 

                    3/23/2015 12:00 AM    Vitamin B12 injection    Reviewed

 

                    2011 12:00 AM    ASSAY OF LITHIUM    Reviewed

 

                    2011 12:00 AM    B12 Injection Ndc#[address]-76  [facility]    Reviewed

 

                    2015 12:00 AM    THER/PROPH/DIAG INJ SC/IM    Reviewed

 

                    2015 12:00 AM    B12 Injection, Up to 1000 Mcg NDC#5301-5166-73    Reviewed

 

                    2015 12:00 AM    COMPLETE CBC W/AUTO DIFF WBC    Reviewed

 

                    2015 12:00 AM    COMPREHEN METABOLIC PANEL    Reviewed

 

                    2015 12:00 AM    LIPID PANEL         Reviewed

 

                    2015 12:00 AM    ASSAY OF LITHIUM    Reviewed

 

                    2011 12:00 AM    VIT D 1 25-DIHYDROXY    Reviewed

 

                    2011 12:00 AM    VITAMIN B-12        Reviewed

 

                    2015 12:00 AM    B12 Injection, Up to 1000 Mcg NDC#2510-2032-08    Reviewed

 

                    2015 12:00 AM    THER/PROPH/DIAG INJ SC/IM    Reviewed

 

                    2015 12:00 AM    B12 Injection, Up to 1000 Mcg NDC#7408-9656-95    Reviewed

 

                    2011 12:00 AM    THER/PROPH/DIAG INJ SC/IM    Reviewed

 

                    2011 12:00 AM    B12 Injection ([facility]) Ndc#3287-4855-25    Reviewed

 

                    2015 12:00 AM    THER/PROPH/DIAG INJ SC/IM    Reviewed

 

                    2015 12:00 AM    B12 Injection, Up to 1000 Mcg NDC#3119-0366-46    Reviewed







Results Summary







                          Data and Description      Results

 

                          2004 12:00 AM        Colonoscopy-Women and Men over 50 Normal 

 

                          2008 12:00 AM         Pap Smear Declined 

 

                          10/7/2009 12:00 AM        Cholest Cry Stone Ql .0 %LDLc SerPl-mCnc 123.0 mg/dLHDLc

 SerPl-mCnc 34.0 mg/dLTrigl SerPl-mCnc 190.0 mg/dLGlucose SerPl-mCnc 78.0 mg/dL

 

                          2009 12:00 AM        Mammogram -Women over 40 Normal HIV1+2 Ab Ser Ql no risk 

 

                          2010 8:47 AM         Dexa Bone Scan Refused Aspirin reccommended Contraindication 



 

                          2010 8:48 AM         Depression Done 

 

                          2010 12:00 AM         Foot Exam-Diabetic Done 

 

                          2010 12:00 AM         Cholest Cry Stone Ql .0 %LDLc SerPl-mCnc 126.0 mg/dLGlucose

 SerPl-mCnc 102.0 mg/dL

 

                          2010 8:45 AM          TRIGLYCERIDES 122.0 mg/dLCHOLESTEROL 186.0 mg/dLHDL 36.0 mg/dLLDL

 (CALC) 126.0 mg/dLGLUCOSE 102.0 mg/dLSODIUM 143.0 mmol/LPOTASSIUM 3.70 
mmol/LCHLORIDE 111.0 mmol/LCO2 23.0 mmol/LBUN 10.0 mg/dLCREATININE 0.80 
mg/dLSGOT/AST 12.0 IU/LSGPT/ALT 11.0 IU/LALK PHOS 65.0 IU/LTOTAL PROTEIN 7.20 
g/dLALBUMIN 3.90 g/dLTOTAL BILI 0.50 mg/dLCALCIUM 10.20 mg/dLeGFR >60 
mL/min/1.73 m2WBC 5.7 RBC 3.26 HGB 10.60 g/dLHCT 31.70 %MCV 97.0 fLMCH 32.50 
pgMCHC 33.40 g/dLRDW CV 13.30 %MPV 9.70 fLPLT 287 %NEUT 62.90 %%LYMP 21.80 
%%MONO 9.90 %%EOS 5.0 %%BASO 0.40 %#NEUT 3.56 #LYMP 1.23 #MONO 0.56 #EOS 0.28 
#BASO 0.02 

 

                          2010 12:00 AM        Glucose SerPl-mCnc 96.0 mg/dLCholest Cry Stone Ql .0 %LDLc

 SerPl-mCnc 146.0 mg/dL

 

                          2010 8:26 AM         TRIGLYCERIDES 106.0 mg/dLCHOLESTEROL 199.0 mg/dLHDL 32.0 mg/dLLDL

 (CALC) 146.0 mg/dLGLUCOSE 96.0 mg/dLSODIUM 143.0 mmol/LPOTASSIUM 4.0 
mmol/LCHLORIDE 113.0 mmol/LCO2 24.0 mmol/LBUN 13.0 mg/dLCREATININE 1.0 
mg/dLSGOT/AST 11.0 IU/LSGPT/ALT 6.0 IU/LALK PHOS 56.0 IU/LTOTAL PROTEIN 6.60 
g/dLALBUMIN 3.80 g/dLTOTAL BILI 0.50 mg/dLCALCIUM 9.30 mg/dLeGFR 57 

 

                          10/6/2010 12:00 AM        Cholest Cry Stone Ql .0 %LDLc SerPl-mCnc 111.0 mg/dLGlucose

 SerPl-mCnc 81.0 mg/dL

 

                          10/6/2010 2:45 PM         TRIGLYCERIDES 123.0 mg/dLCHOLESTEROL 178.0 mg/dLHDL 42.0 mg/dLLDL

 (CALC) 111.0 mg/dLGLUCOSE 81.0 mg/dLSODIUM 139.0 mmol/LPOTASSIUM 4.10 
mmol/LCHLORIDE 106.0 mmol/LCO2 24.0 mmol/LBUN 13.0 mg/dLCREATININE 0.90 
mg/dLSGOT/AST 13.0 IU/LSGPT/ALT 11.0 IU/LALK PHOS 61.0 IU/LTOTAL PROTEIN 7.10 
g/dLALBUMIN 3.90 g/dLTOTAL BILI 0.30 mg/dLCALCIUM 9.30 mg/dLeGFR >60 mL/min/1.73
 m2WBC 6.9 RBC 3.59 HGB 11.50 g/dLHCT 35.30 %MCV 98.0 fLMCH 32.0 pgMCHC 32.60 
g/dLRDW CV 12.90 %MPV 9.90 fLPLT 311 %NEUT 64.90 %%LYMP 22.50 %%MONO 7.20 %%EOS 
5.10 %%BASO 0.30 %#NEUT 4.45 #LYMP 1.54 #MONO 0.49 #EOS 0.35 #BASO 0.02 

 

                          2011 12:00 AM         Mammogram -Women over 40 Ordered 

 

                          2011 10:25 AM        TRIGLYCERIDES 111.0 mg/dLCHOLESTEROL 195.0 mg/dLHDL 43.0 mg/dLLDL

 (CALC) 130.0 mg/dLWBC 5.3 RBC 3.76 HGB 12.0 g/dLHCT 37.80 %.0 fLMCH 
31.90 pgMCHC 31.70 g/dLRDW CV 13.0 %MPV 9.70 fLPLT 259 %NEUT 69.0 %%LYMP 17.60 
%%MONO 8.30 %%EOS 4.70 %%BASO 0.40 %#NEUT 3.63 #LYMP 0.93 #MONO 0.44 #EOS 0.25 
#BASO 0.02 GLUCOSE 102.0 mg/dLSODIUM 146.0 mmol/LPOTASSIUM 4.20 mmol/LCHLORIDE 
113.0 mmol/LCO2 23.0 mmol/LBUN 15.0 mg/dLCREATININE 1.0 mg/dLSGOT/AST 12.0 
IU/LSGPT/ALT 17.0 IU/LALK PHOS 60.0 IU/LTOTAL PROTEIN 6.90 g/dLALBUMIN 4.20 
g/dLTOTAL BILI 0.40 mg/dLCALCIUM 9.70 mg/dLeGFR 57 

 

                          2011 11:49 AM        Cholest Cry Stone Ql .0 %LDLc SerPl-mCnc 130.0 mg/dLHDLc

 SerPl-mCnc 43.0 mg/dLTrigl SerPl-mCnc 111.0 mg/dLGlucose SerPl-mCnc 102.0 mg/dL

 

                          2011 11:52 AM        Pap Smear Declined 

 

                          2011 11:28 AM        Lithium 2.080 mmol/LGLUCOSE 102.0 mg/dLSODIUM 135.0 mmol/LPOTASSIUM

 3.90 mmol/LCHLORIDE 106.0 mmol/LCO2 21.0 mmol/LBUN 12.0 mg/dLCREATININE 1.30 
mg/dLCALCIUM 10.70 mg/dLeGFR 42 

 

                          2011 8:58 AM          Lithium 0.690 mmol/L

 

                          2011 2:38 PM         VITAMIN B12 3483.0 pg/mL

 

                          2013 3:35 PM          WBC 5.1 RBC 3.73 HGB 11.70 g/dLHCT 36.40 %MCV 98.0 fLMCH 31.40

 pgMCHC 32.10 g/dLRDW CV 13.10 %MPV 9.80 fLPLT 224 %NEUT 66.80 %%LYMP 19.10 
%%MONO 9.0 %%EOS 4.90 %%BASO 0.20 %#NEUT 3.42 #LYMP 0.98 #MONO 0.46 #EOS 0.25 
#BASO 0.01 GLUCOSE 88.0 mg/dLSODIUM 141.0 mmol/LPOTASSIUM 4.10 mmol/LCHLORIDE 
110.0 mmol/LCO2 22.0 mmol/LBUN 22.0 mg/dLCREATININE 1.10 mg/dLCALCIUM 9.80 
mg/dLeGFR 50 Lithium 0.760 mmol/L

 

                          2013 11:02 AM        TRIGLYCERIDES 106.0 mg/dLCHOLESTEROL 181.0 mg/dLHDL 46.0 mg/dLLDL

 (CALC) 114.0 mg/dLVITAMIN D 41.10 ng/mL

 

                          10/17/2014 10:10 AM       WBC 5.0 RBC 3.66 HGB 11.60 g/dLHCT 36.80 %.0 fLMCH 31.70

 pgMCHC 31.50 g/dLRDW CV 13.50 %MPV 10.10 fLPLT 209 %NEUT 69.20 %%LYMP 21.0 
%%MONO 6.40 %%EOS 3.20 %%BASO 0.20 %#NEUT 3.46 #LYMP 1.05 #MONO 0.32 #EOS 0.16 
#BASO 0.01 GLUCOSE 100.0 mg/dLSODIUM 148.0 mmol/LPOTASSIUM 3.90 mmol/LCHLORIDE 
114.0 mmol/LCO2 26.0 mmol/LBUN 12.0 mg/dLCREATININE 1.20 mg/dLSGOT/AST 9.0 
IU/LSGPT/ALT <6 IU/LALK PHOS 82.0 IU/LTOTAL PROTEIN 6.90 g/dLALBUMIN 4.0 
g/dLTOTAL BILI 0.40 mg/dLCALCIUM 10.50 mg/dLeGFR 45 TRIGLYCERIDES 96.0 
mg/dLCHOLESTEROL 155.0 mg/dLHDL 38.0 mg/dLLDL (CALC) 98.0 mg/dLLithium 0.850 
mmol/LCancer Antigen (CA) 125 8.30 U/mL

 

                          2015 10:25 AM        Lithium 0.790 mmol/LWBC 4.8 RBC 3.44 HGB 11.0 g/dLHCT 35.20 

%.0 fLMCH 32.0 pgMCHC 31.30 g/dLRDW CV 14.0 %MPV 9.30 fLPLT 210 %NEUT 
70.80 %%LYMP 17.20 %%MONO 8.10 %%EOS 3.50 %%BASO 0.40 %#NEUT 3.41 #LYMP 0.83 
#MONO 0.39 #EOS 0.17 #BASO 0.02 TRIGLYCERIDES 107.0 mg/dLCHOLESTEROL 174.0 
mg/dLHDL 43.0 mg/dLLDL (CALC) 110.0 mg/dLGLUCOSE 90.0 mg/dLSODIUM 145.0 
mmol/LPOTASSIUM 3.80 mmol/LCHLORIDE 115.0 mmol/LCO2 24.0 mmol/LBUN 17.0 mg
/dLCREATININE 1.30 mg/dLSGOT/AST 18.0 IU/LSGPT/ALT 17.0 IU/LALK PHOS 56.0 
IU/LTOTAL PROTEIN 6.70 g/dLALBUMIN 3.90 g/dLTOTAL BILI 0.40 mg/dLCALCIUM 9.80 
mg/dLeGFR 41 

 

                          2015 8:50 AM        WBC 5.8 RBC 3.29 HGB 10.70 g/dLHCT 34.0 %.0 fLMCH 32.50

 pgMCHC 31.50 g/dLRDW CV 13.60 %MPV 9.60 fLPLT 223 %NEUT 69.60 %%LYMP 18.90 
%%MONO 8.50 %%EOS 2.80 %%BASO 0.20 %#NEUT 4.03 #LYMP 1.09 #MONO 0.49 #EOS 0.16 
#BASO 0.01 Lithium 0.620 mmol/LGLUCOSE 83.0 mg/dLSODIUM 139.0 mmol/LPOTASSIUM 
3.90 mmol/LCHLORIDE 109.0 mmol/LCO2 22.0 mmol/LBUN 19.0 mg/dLCREATININE 1.40 
mg/dLSGOT/AST 19.0 IU/LSGPT/ALT 21.0 IU/LALK PHOS 55.0 IU/LTOTAL PROTEIN 6.50 
g/dLALBUMIN 3.90 g/dLTOTAL BILI 0.50 mg/dLCALCIUM 9.60 mg/dLeGFR 38 
TRIGLYCERIDES 121.0 mg/dLCHOLESTEROL 192.0 mg/dLHDL 51.0 mg/dLLDL 121.0 mg/dLTSH
 1.210 uIU/mL







History Of Immunizations







       Name    Date Admin    Mfg Name    Mfg Code    Trade Name    Lot#    Route    Inj    Vis Given    Vis

 Pub                                    CVX

 

        Influenza    2008    Not Entered    NE      Not Entered            Not Entered    Not Entered

                    1            999

 

          Pneumococcal    2008    [employer]    MSD       Pneumovax 23              Intramuscular

                Not Entered     1        999

 

           Influenza    10/15/2010    [employer]    NOV        Fluvirin > 12 Years    

[id number]     Intramuscular    Left Deltoid    10/15/2010    2009    999

 

          Pneumococcal    3/23/2015    [employer]    [facility]       Prevnar 13    [id number]    Intramuscular

                Right Gluteous Medius    3/23/2015       2013       109







History of Past Illness







                    Name                Date of Onset       Comments

 

                    Peritoneal Neoplasm, Malignant                         

 

                    Hyperlipidemia                           

 

                    Bipolar disorder, unspecified                         

 

                    Artificial opening status; colostomy                         

 

                    B12 deficiency                           

 

                    Anemia, Pernicious                         

 

                    Arthritis unspecified                         

 

                    cervical cancer                          

 

                    Artificial opening status; colostomy    2010  1:10PM     

 

                    Bipolar disorder, unspecified    2010  1:10PM     

 

                    Hyperlipidemia      2010  1:10PM     

 

                    Anemia, Pernicious    2010  1:10PM     

 

                    Postoperative Follow-Up    2010  1:55PM     

 

                    Postoperative Follow-Up    Mar  8 2010 10:57AM     

 

                    Artificial opening status; colostomy    Mar  8 2010  1:19PM     

 

                    Peritoneal Neoplasm, Malignant    Mar  8 2010  1:19PM     

 

                    Artificial opening status; colostomy    2010  1:40PM     

 

                    Hyperlipidemia      2010  1:40PM     

 

                    Anemia, Pernicious    2010  1:40PM     

 

                    Peritoneal Neoplasm, Malignant    2010  1:40PM     

 

                    Arthritis unspecified    2010  1:40PM     

 

                    Anemia of Chronic Illness    2010  1:40PM     

 

                    Tinea corporis      2010  3:17PM     

 

                    Bipolar disorder, unspecified    2010  1:33PM     

 

                    Hyperlipidemia      2010  1:33PM     

 

                    Anemia, Pernicious    2010  1:33PM     

 

                    Peritoneal Neoplasm, Malignant    2010  1:33PM     

 

                    B12 deficiency      2010  1:33PM     

 

                    Ethmoidal Sinusitis, Acute    Sep 21 2010  3:53PM     

 

                    Wheezing            Sep 21 2010  3:53PM     

 

                    Flu                 Oct 15 2010  1:40PM     

 

                    Bipolar disorder, unspecified    Oct 15 2010  1:42PM     

 

                    Hyperlipidemia      Oct 15 2010  1:42PM     

 

                    Anemia, Pernicious    Oct 15 2010  1:42PM     

 

                    Peritoneal Neoplasm, Malignant    Oct 15 2010  1:42PM     

 

                    Bipolar disorder, unspecified    2011 12:01PM     

 

                    Hyperlipidemia      2011 12:01PM     

 

                    Anemia, Pernicious    2011 12:01PM     

 

                    Peritoneal Neoplasm, Malignant    2011 12:01PM     

 

                    Bipolar disorder, unspecified    Apr 15 2011 10:55AM     

 

                    Major Depression    2011 10:11AM     

 

                    Bipolar Disorder    2011 10:11AM     

 

                    Cancer              May 10 2011  4:16PM     

 

                    Major Depression    May 10 2011  3:16PM     

 

                    Bipolar Disorder    May 10 2011  3:16PM     

 

                    Hypercalcemia       May 23 2011  2:47PM     

 

                    Bipolar disorder, unspecified    May 23 2011  2:47PM     

 

                    Colon Cancer, Personal History    May 23 2011  2:47PM     

 

                    Bipolar Disorder    May 31 2011  4:39PM     

 

                    Depressive Disorder    2011 10:01AM     

 

                    Vitamin B12 deficiency    2011 10:01AM     

 

                    Vitamin D Deficiency    2011  5:07PM     

 

                    Anemia, Vitamin B12 Deficiency    2011  5:07PM     

 

                    B12 deficiency      2011  3:56PM     

 

                    Routine gynecological examination    Aug  4 2011  9:08AM     

 

                    Screening Examination for Breast Cancer    Aug  4 2011  9:08AM     

 

                    Tinea Corporis      Aug  4 2011  9:08AM     

 

                    Depressive Disorder    Sep 23 2011  8:47AM     

 

                    Contact Dermatitis    Sep 23 2011  8:47AM     

 

                    Anemia, Pernicious    Sep 23 2011  8:47AM     

 

                    B12 deficiency      Sep 23 2011  8:47AM     

 

                    B12 deficiency      Sep 27 2011  2:58PM     

 

                    B12 deficiency      Oct 20 2011  2:34PM     

 

                    Flu                 Dec  9 2011  3:16PM     

 

                    B12 deficiency      Dec  9 2011  3:17PM     

 

                    B12 deficiency      2012  4:52PM     

 

                    B12 deficiency      2012 11:10AM     

 

                    B12 deficiency      2012  3:37PM     

 

                    B12 deficiency      May  3 2012  4:10PM     

 

                    B12 deficiency      2012  2:54PM     

 

                    B12 deficiency      2012 11:23AM     

 

                    B12 deficiency      Aug  9 2012  2:08PM     

 

                    B12 deficiency      Sep  6 2012  4:36PM     

 

                    B12 deficiency      Oct 16 2012 10:23AM     

 

                    Flu                 Feb  2013  3:11PM     

 

                    Bipolar disorder, unspecified    Feb  2013  2:48PM     

 

                    Anemia, Pernicious    2013  2:48PM     

 

                    B12 deficiency      Feb  2013  2:48PM     

 

                    Extrapyramidal abnormal movement disorder    Feb  2013  2:48PM     

 

                    B12 deficiency      Apr  3 2013 12:03PM     

 

                    Bipolar disorder, unspecified    May  7 2013  1:31PM     

 

                    Anemia, Pernicious    May  7 2013  1:31PM     

 

                    B12 deficiency      May  7 2013  1:31PM     

 

                    Extrapyramidal abnormal movement disorder    May  7 2013  1:31PM     

 

                    B12 deficiency      2013  3:42PM     

 

                    B12 deficiency      2013  1:31PM     

 

                    Hyperlipidemia      Aug  7 2013 10:37AM     

 

                    Vitamin D Deficiency    Aug  7 2013 10:37AM     

 

                    Bipolar disorder, unspecified    Aug  7 2013 10:37AM     

 

                    Anemia, Pernicious    Aug  7 2013 10:37AM     

 

                    B12 deficiency      Aug  7 2013 10:37AM     

 

                    B12 deficiency      Sep 25 2013 11:15AM     

 

                    B12 deficiency      Dec 11 2013  3:16PM     

 

                    B12 deficiency      Mar  6 2014  1:48PM     

 

                    B12 deficiency      May 21 2014  3:17PM     

 

                    Screening Examination for Breast Cancer    2014  3:23PM     

 

                    Periumbilical abdominal pain    2014  3:23PM     

 

                    B12 deficiency      Jul 10 2014  2:52PM     

 

                    Anemia, Vitamin B12 Deficiency    Aug 13 2014  4:50PM     

 

                    Bipolar disorder    Oct 16 2014 11:13AM     

 

                    Hyperlipidemia      Oct 16 2014 11:13AM     

 

                    Anemia, Pernicious    Oct 16 2014 11:13AM     

 

                    Peritoneal Neoplasm, Malignant    Oct 16 2014 11:13AM     

 

                    Screening breast examination    Oct 16 2014 11:13AM     

 

                    Weight loss         Oct 16 2014 11:13AM     

 

                    Anemia, Pernicious    Mar 23 2015  2:57PM     

 

                    B12 deficiency      Mar 23 2015  2:57PM     

 

                    Need for Prevnar vaccine    Mar 23 2015  2:57PM     

 

                    Bipolar disorder    Mar 23 2015  2:57PM     

 

                    Hyperlipidemia      Mar 23 2015  2:57PM     

 

                    Anemia, Pernicious    Mar 23 2015  2:57PM     

 

                    Peritoneal Neoplasm, Malignant    Mar 23 2015  2:57PM     

 

                    B12 deficiency      May  4 2015  4:48PM     

 

                    Hyperlipidemia      May 13 2015  9:56AM     

 

                    Anemia              May 13 2015  9:56AM     

 

                    Bipolar disorder    May 13 2015  9:56AM     

 

                    Bipolar disorder    May 14 2015  3:27PM     

 

                    Hyperlipidemia      May 14 2015  3:27PM     

 

                    Anemia, Pernicious    May 14 2015  3:27PM     

 

                    Peritoneal Neoplasm, Malignant    May 14 2015  3:27PM     

 

                    B12 deficiency      2015  2:20PM     

 

                    B12 deficiency      2015 11:34AM     

 

                    B12 deficiency      Aug 18 2015  9:06AM     

 

                    Tinea Corporis      Sep 18 2015  8:54AM     

 

                    B12 deficiency      Sep 18 2015  8:54AM     

 

                    B12 deficiency      2015 10:28AM     

 

                    Herpes zoster without complication    Dec  3 2015  9:52AM     

 

                    B12 deficiency      Dec 23 2015 11:21AM     

 

                    B12 deficiency      2016  4:51PM     

 

                    Vitamin B 12 deficiency    Mar 14 2016  5:35PM     







Payers







           Insurance Name    Company Name    Plan Name    Plan Number    Policy Number    Policy Group

 Number                                 Start Date

 

                    Medicare Part A    Medicare Part A              [id number]              2006



 

                          [employer]    [employer]                 [id number]

                                                    

 

                    Medicare Part B    Medicare Of [place]              [id number]              2006

 

                          [employer]    [employer]                 50 percent

                                                    2009

 

                    [facility]    [employer]              [id number]              N/A

 

                    Medicare Part A    Medicare Part A              [id number]              N/A







History of Encounters







                    Visit Date          Visit Type          Provider

 

                    2016            Nurse visit         Bhupinder Louise DO

 

                    2015          Nurse visit         Bhupinder Louise DO

 

                    12/3/2015           Office visit        Bhupinder Louise DO

 

                    2015          Nurse visit         Bhupinder Louise DO

 

                    2015           Office visit        Bhupinder Louise DO

 

                    2015           Nurse visit         Bhupinder Louise DO

 

                    2015            Nurse visit         Bhupinder Louise DO

 

                    2015            Nurse visit         Bhupinder Louise DO

 

                    2015           Office visit        Bhupinder Louise DO

 

                    2015            Nurse visit         Bhupinder Louise DO

 

                    3/23/2015           Office visit        Bhupinder Louise DO

 

                    10/16/2014          Office visit        Bhupinder Louise DO

 

                    2014           Nurse visit         Radha GREEN

 

                    7/10/2014           Nurse visit         Bhupinder Louise DO

 

                    2014           Office visit        Bhupinder Louise DO

 

                    2014           Nurse visit         Bhupinder Aspen DO

 

                    3/6/2014            Nurse visit         Bhupinder Aspen DO

 

                    2014            Yasmine Lopez MD

 

                    2013          Nurse visit         Bhupinder Aspen DO

 

                    2013           Nurse visit         Bhupinder Aspen DO

 

                    2013            Office visit        Bhupinder Aspen DO

 

                    2013            Nurse visit         Bhupinder Aspen DO

 

                    2013            Nurse visit         Bhupinder Aspen DO

 

                    2013            Office visit        Buhpinder Aspen DO

 

                    4/3/2013            Nurse visit         Bhupinder Aspen DO

 

                    2013            Office visit        Bhupinder Aspen DO

 

                    10/16/2012          Nurse visit         Bhupinder Aspen DO

 

                    2012            Nurse visit         Bhupinder Aspen DO

 

                    2012            Voided              Bhupinder Aspen DO

 

                    2012            Nurse visit         Bhupinder Aspen DO

 

                    2012            Nurse visit         Bhupinder Aspen DO

 

                    2012           Nurse visit         Bhupinder Aspen DO

 

                    5/3/2012            Nurse visit         Bhupinder Aspen DO

 

                    2012           Nurse visit         Bhupinder Aspen DO

 

                    2012           Nurse visit         Bhupinder Aspen DO

 

                    2012           Nurse visit         Bhupinder Aspen DO

 

                    2011           Nurse visit         Bhupinder Aspen DO

 

                    10/20/2011          Nurse visit         Bhupinder Aspen DO

 

                    2011           Office visit        Bhupinder Aspen DO

 

                    2011           Nurse visit         Radha GREEN

 

                    2011            Office visit        Bhupinder Aspen DO

 

                    2011           Nurse visit         Bhupinder Aspen DO

 

                    2011            Office visit        Bhupinder Aspen DO

 

                    2011           Office visit        Bhupinder Aspen DO

 

                    5/10/2011           Office visit        Bhupinder Aspen DO

 

                    2011           Office visit        Bhupinder Aspen DO

 

                    4/15/2011           Office visit        Devin Angel DO

 

                    2011           Office visit        Devin Angel DO

 

                    10/15/2010          Office visit        Devin Angel DO

 

                    2010           Office visit        Devin Angel DO

 

                    2010            Office visit        Devin Angel DO

 

                    2010           Office visit        Devin Angel DO

 

                    2010            Office visit        Devin Angel DO

 

                    3/8/2010            Office visit        Devin Masterson MD

 

                    2010            Surgery             Devin Masterson MD

 

                    2010            Office visit        Devin Angel DO

 

                    2010           Surgery             Devin Masterson MD

 

                    2010           Hospital            Devin Masterson MD

 

                    2010           Hospital            Devin Masterson MD

 

                    10/22/2009          Office visit        Devin Angel DO

## 2019-06-26 NOTE — XMS REPORT
MU2 Ambulatory Summary

                             Created on: 2017



Pauline Gan

External Reference #: [id number]

: 1950

Sex: Female



Demographics







                          Address                   [address]

[place]  [id number]

 

                          Home Phone                [phone]

 

                          Preferred Language        English

 

                          Marital Status            Legally 

 

                          Hindu Affiliation     Unknown

 

                          Race                      White

 

                          Ethnic Group              Not  or 





Author







                          Author                    Bhupinder Louise

 

                          [facility]

 

                          Address                   [address]

[place]  [id number]



 

                          Phone                     [phone]







Care Team Providers







                    Care Team Member Name    Role                Phone

 

                    Bhupinder Louise    PCP                 Unavailable

 

                    Bhupinder Louise    PreferredProvider    Unavailable







Allergies and Adverse Reactions







                    Name                Reaction            Notes

 

                    NO KNOWN DRUG ALLERGIES                         







Plan of Treatment







             Planned Activity    Comments     Planned Date    Planned Time    Plan/Goal

 

             Injection,Subcutaneous/Intramuscul, [facility]                 2017    12:00 AM      

 

             URINALYSIS ROUTINE C&S IF IND                 2017     12:00 AM      







Medications







                                        Active 

 

             Name         Start Date    Estimated Completion Date    SIG          Comments

 

                Latuda 20 mg oral tablet                                    take 1 tablet (20 mg) by oral route once daily with

 food (at least 350 calories)            

 

             pravastatin 40 mg oral tablet    3/30/2015                 TAKE 1 TABLET BY MOUTH DAILY     

 

                Namenda XR 28 mg oral capsule,sprinkle,ER 24hr    2015                       take 1 capsule (28

 mg) by oral route once daily            

 

                Namenda XR 28 mg oral capsule,sprinkle,ER 24hr    2016                       take 1 capsule (28

 mg) by oral route once daily            

 

                potassium chloride 10 mEq oral tablet extended release    2016                       take 1 tablet

 (10 meq) by oral route once daily       

 

             pravastatin 40 mg oral tablet    2016                 TAKE 1 TABLET BY MOUTH DAILY     

 

                Vitamin B-12 1,000 mcg/mL injection solution    2016                       inject 1 milliliter 

(1,000 mcg) by intramuscular route once a month     

 

                potassium chloride 10 mEq oral tablet extended release    2016                      take 1 tablet

 (10 meq) by oral route once daily       

 

                Namenda XR 28 mg oral capsule,sprinkle,ER 24hr    2016                      TAKE 1 CAPSULE BY

 MOUTH EVERY DAY                         

 

                furosemide 40 mg oral tablet    2016                      take 1 tablet (40 mg) by oral route

 once daily                              

 

                mirtazapine 45 mg oral tablet                                    take 1 tablet (45 mg) by oral route once daily

 before bedtime                          

 

             Fish Oil 300-1,000 mg oral capsule                              take 1 capsule by oral route daily     

 

             Vitamin D3 1,000 unit oral tablet                              take 1 tablet by oral route daily     

 

                Calcium 600 600 mg calcium (1,500 mg) oral tablet                                    take 1 tablet by oral route

 daily                                   

 

                Aspirin Low Dose 81 mg oral tablet,delayed release (DR/EC)                                    take 1 tablet 

(81 mg) by oral route once daily         

 

                metoclopramide HCl 10 mg oral tablet    2017                       take 1 tablet by oral route 

2 times a day for 50 days                

 

             furosemide 40 mg oral tablet    2017                 TAKE 1 TABLET BY MOUTH DAILY     

 

                Namenda XR 28 mg oral capsule,sprinkle,ER 24hr    2017                       TAKE 1 CAPSULE BY 

MOUTH EVERY DAY                          

 

                Linzess 72 mcg oral capsule    2017                       take 1 capsule (72 mcg) by oral route

 once daily on an empty stomach at least 30 minutes before 1st meal of the day     



 

             pravastatin 40 mg oral tablet    2017                 TAKE 1 TABLET BY MOUTH DAILY     

 

                potassium chloride 10 mEq oral tablet extended release    2017                       TAKE 2 TABLETs

 BY MOUTH twice DAILY for one week       

 

                cefuroxime axetil 500 mg oral tablet    2017        take 1 tablet (500 mg)

 by oral route 2 times per day for 10 days     

 

                metoclopramide HCl 10 mg oral tablet    2017                       TAKE 1 TABLET BY ORAL ROUTE 

2 TIMES A DAY FOR 50 DAYS                









                                         

 

             Name         Start Date    Expiration Date    SIG          Comments

 

             Reglan 10mg    3/29/2010    2010    one ac and hs     

 

                Keflex 500 mg oral capsule    2010       10/1/2010       take 1 capsule (500 mg) by oral

 route every 6 hours for 10 days         

 

                Bactrim -160 mg oral tablet    2011       take 1 tablet by oral route

 every 12 hours for 7 days               

 

                triamcinolone acetonide 0.1 % topical cream    2011      apply a thin

 layer to the affected area(s) by topical route 2 times per day     

 

                sertraline 100 mg oral tablet    4/10/2012       5/10/2012       take 1.5 tablets by oral route

 daily for 30 days                       

 

                ergocalciferol (vitamin D2) 50,000 unit oral capsule    4/15/2013       2013       TAKE

 ONE CAPSULE BY MOUTH ONCE A WEEK        

 

                CYANOCOBALAM 1000MCGINJ 1000 milliliter    2013       INJECT 1ML INTRAMUSCULAR

 ONCE A MONTH                            

 

                pravastatin 40 mg oral tablet    3/25/2014       3/20/2015       TAKE ONE TABLET BY MOUTH EVERY

 DAY                                     

 

                          Zostavax (PF) 19,400 unit/0.65 mL subcutaneous suspension for reconstitution    3/23/2015

                    3/24/2015           inject 0.65 milliliter by subcutaneous route once     

 

                famciclovir 500 mg oral tablet    12/3/2015       12/10/2015      take 1 tablet (500 mg) by

 oral route every 8 hours for 7 days     

 

                furosemide 40 mg oral tablet    2016      take 1 tablet (40 mg) by oral

 route once daily                        

 

                Cipro 500 mg oral tablet    2016       take 1 tablet (500 mg) by oral route

 2 times per day for 5 days              

 

                Bactrim -160 mg oral tablet    2016        take 1 tablet by oral route

 every 12 hours for 7 days               

 

                metoclopramide HCl 10 mg oral tablet    2017       take 1 tablet by oral

 route 2 times a day for 50 days         

 

                Macrobid 100 mg oral capsule    2017       take 1 capsule (100 mg) by oral

 route 2 times per day with food for 7 days     

 

                Augmentin 875-125 mg oral tablet    2017       take 1 tablet by oral route

 every 12 hours for 7 days               

 

                amoxicillin 500 mg oral tablet    2017       take 1 tablet (500 mg) by oral

 route every 12 hours for 7 days         

 

                ciprofloxacin HCl 500 mg oral tablet    2017       take 1 tablet (500 mg)

 by oral route every 12 hours for 5 days     









                                        Discontinued 

 

             Name         Start Date    Discontinued Date    SIG          Comments

 

                Tylenol 325 mg oral tablet                    2013        take 1 - 2 tablets (325 -650 mg) by oral

 route every 4-6 hours as needed         

 

                Calcium 600 + D(3) 600 mg(1,500mg) -400 unit oral tablet                    2011       take 1 tablet

 by oral route 2 times a day            no longer taking

 

                Vitamin B-12 1,000 mcg oral tablet extended release    2010       take 1

 tablet by oral route daily             no longer taking

 

                Antifungal (clotrimazole) 1 % topical cream    2010       apply to the 

affected and surrounding areas of skin by topical route 2 times per day morning 
and evening                              

 

                sertraline 100 mg oral tablet    5/10/2011       2011       take 2 tablets (200 mg) by 

oral route once daily                   discontinued by Dr. Serrano

 

                mirtazapine 15 mg oral tablet                    2011        take 1 tablet (15 mg) by oral route 

once daily before bedtime               Dr. Serrano

 

                mirtazapine 15 mg oral tablet                    2011        take 1 tablet (15 mg) by oral route 

once daily before bedtime               dc'd by Dr. Serrano

 

                Pristiq 50 mg oral tablet extended release 24 hr                    2013        take 1 tablet (50

 mg) by oral route once daily           Dr. Serrano

 

                Pristiq 50 mg oral tablet extended release 24 hr                    2013        take 1 tablet (50

 mg) by oral route once daily           dose updated

 

                Vitamin B-12 1,000 mcg/mL injection solution    2011        inject 1 milliliter

 (1,000 mcg) by intramuscular route once a month    on list already

 

                    syringe with needle 1 mL 25 gauge x 1" miscellaneous syringe    2011

                          use for injection once a month     

 

                clotrimazole 1 % topical cream    2011        apply to the affected and surrounding

 areas of skin by topical route 2 times per day in the morning and evening     

 

                Vitamin D2 50,000 unit oral capsule    2011        take 1 capsule (50,000

 unit) by oral route once weekly        generic on list

 

                Pravachol 40 mg oral tablet    2012        take 1 tablet (40 mg) by oral 

route once daily for 90 days            generic on list

 

                lithium carbonate 300 mg oral capsule    2012        take 1 capsule by oral

 route daily                            dose updated

 

                Pristiq 100 mg oral tablet extended release 24 hr                    4/10/2012       take 1 and 1/2 

tablet (150 mg) by oral route once daily    Mental Health provider

 

                Pristiq 100 mg oral tablet extended release 24 hr                    4/10/2012       take 1 and 1/2 

tablet (150 mg) by oral route once daily    Discontinued by Dr Efrain Knight at [facility]

 

                hydroxyzine HCl 50 mg oral tablet    10/16/2014      2015       take 1 tablet (50 mg) 

by oral route at bedtime                 

 

                lithium carbonate 300 mg oral capsule    2015       take 1 capsule (300

 mg) by oral route 2 for 30 days         

 

                fluconazole 100 mg oral tablet    2015       12/3/2015       take 1 tablet (100 mg) by 

oral route once a week                   

 

                ketoconazole 2 % topical cream    2015       12/3/2015       apply to the affected area(s)

 by topical route 2 times per day        

 

                prednisone 10 mg oral tablet    12/3/2015       2016        take 2 tablets (20 mg) by oral

 route once daily for 4 days 1 tablet daily for 4 days 0.5 tablet daily for 4 
days                                     

 

                triamcinolone acetonide 0.1 % topical cream    2016       apply a thin layer

 to the affected area(s) by topical route 2 times per day     

 

                Cipro 500 mg oral tablet    1/15/2017       2017       take 1 tablet (500 mg) by oral route

 every 12 hours for 10 days              







Problem List







                    Description         Status              Onset

 

                    Artificial opening status; colostomy    Active               

 

                    Bipolar disorder, unspecified    Active               

 

                    Hyperlipidemia      Active               

 

                    Peritoneal Neoplasm, Malignant    Active               

 

                    Anemia, Pernicious    Active               

 

                    Arthritis unspecified    Active               

 

                    B12 deficiency      Active               







Vital Signs







      Date    Time    BP-Sys(mm[Hg]    BP-Lynn(mm[Hg])    HR(bpm)    RR(rpm)    Temp    WT    HT    HC    BMI

                    BSA                 BMI Percentile      O2 Sat(%)

 

        2017    1:34:00 PM    118 mmHg    62 mmHg    122 bpm    18 rpm    97.8 F    161.375 lbs    

69 in                     23.83 kg/m2    1.89 m2                   96 %

 

        2017    3:05:00 PM    134 mmHg    70 mmHg    70 bpm    20 rpm    97.4 F    181 lbs    69 in

                          26.7288 kg/m    1.9992 m                 98 %

 

        2017    11:07:00 AM    124 mmHg    64 mmHg    62 bpm    17 rpm    98.2 F    181.2 lbs    69

 in                       26.76 kg/m2    2.00 m2                   98 %

 

        1/15/2017    3:34:00 PM    148 mmHg    89 mmHg    69 bpm    20 rpm    98.2 F    179 lbs    69 in

                          26.4334 kg/m    1.9882 m                 98 %

 

       2017    1:51:00 PM    160 mmHg    90 mmHg    100 bpm    20 rpm    96.5 F    179 lbs             

                                                                98 %

 

       2016    3:11:00 PM    134 mmHg    76 mmHg    80 bpm    20 rpm    98 F    163 lbs    69 in     

                24.0706 kg/m    1.8972 m                      98 %

 

        2016    2:04:00 PM    142 mmHg    86 mmHg    68 bpm    16 rpm    98.5 F    166 lbs    63 in

                          29.41 kg/m2    1.83 m2                   100 %

 

        2016    11:27:00 AM    148 mmHg    78 mmHg    90 bpm    20 rpm    98.2 F    153 lbs    69 in

                          22.5939 kg/m    1.8381 m                 96 %

 

        12/3/2015    9:50:00 AM    132 mmHg    70 mmHg    62 bpm    16 rpm    97.9 F    145 lbs    69 in

                          21.41 kg/m2    1.79 m2                   100 %

 

        2015    8:52:00 AM    132 mmHg    68 mmHg    52 bpm    20 rpm    97.8 F    141 lbs    69 in

                          20.8218 kg/m    1.7645 m                 100 %

 

        2015    3:25:00 PM    120 mmHg    62 mmHg    72 bpm    16 rpm    98.1 F    136 lbs    69 in

                          20.08 kg/m2    1.73 m2                   98 %

 

       3/23/2015    2:55:00 PM    130 mmHg    76 mmHg    68 bpm    18 rpm    97 F    140 lbs    69 in    

                20.6742 kg/m    1.7583 m                      98 %

 

        10/16/2014    11:11:00 AM    120 mmHg    66 mmHg    77 bpm    20 rpm    98 F    130 lbs    69 in

                          19.20 kg/m2    1.69 m2                   100 %

 

        2014    3:21:00 PM    130 mmHg    66 mmHg    63 bpm    18 rpm    97.2 F    160 lbs    69 in

                          23.6276 kg/m    1.8797 m                 99 %

 

        2013    10:35:00 AM    132 mmHg    70 mmHg    66 bpm    20 rpm    98.1 F    157 lbs    69 in

                          23.18 kg/m2    1.86 m2                    

 

        2013    1:29:00 PM    132 mmHg    70 mmHg    76 bpm    18 rpm    98.2 F    166 lbs    69 in 

                          24.5137 kg/m    1.9146 m                  

 

       2013    2:46:00 PM    128 mmHg    70 mmHg    76 bpm    16 rpm    98 F    160 lbs    69 in     

                23.63 kg/m2     1.88 m2                          

 

        2011    8:49:00 AM    128 mmHg    78 mmHg    70 bpm    18 rpm    97.9 F    164 lbs    69 in

                          24.2183 kg/m    1.903 m                  

 

     2011    1:31:00 PM    132 mmHg    68 mmHg    84 bpm         97 F    167 lbs                        

                                         

 

        2011    9:09:00 AM    128 mmHg    70 mmHg    72 bpm    18 rpm    98.2 F    163 lbs    64 in 

                          27.9786 kg/m    1.8272 m                  

 

       2011    10:01:00 AM    132 mmHg    70 mmHg    72 bpm    18 rpm    98.2 F    154 lbs             

                                                                 

 

       2011    2:47:00 PM    128 mmHg    70 mmHg    72 bpm    18 rpm    97.8 F    156 lbs             

                                                                 

 

       5/10/2011    3:16:00 PM    144 mmHg    80 mmHg    72 bpm    18 rpm    98.2 F    158 lbs             

                                                                 

 

        2011    10:11:00 AM    132 mmHg    70 mmHg    70 bpm    18 rpm    98.2 F    168 lbs    69 in

                          24.809 kg/m    1.9261 m                  

 

        4/15/2011    10:52:00 AM    110 mmHg    60 mmHg    75 bpm    16 rpm    97.5 F    172.375 lbs    

69 in                     25.46 kg/m2    1.95 m2                   100 %

 

        2011    11:43:00 AM    120 mmHg    82 mmHg    75 bpm    16 rpm    97.2 F    178.5 lbs    69

 in                       26.3596 kg/m    1.9854 m                 100 %

 

        10/15/2010    1:32:00 PM    120 mmHg    70 mmHg    80 bpm    18 rpm    96.6 F    177 lbs    69 in

                          26.14 kg/m2    1.98 m2                   100 %

 

        2010    3:50:00 PM    168 mmHg    100 mmHg    82 bpm    18 rpm    97.8 F    177.5 lbs    69

 in                       26.2119 kg/m    1.9798 m                 97 %

 

        2010    1:21:00 PM    140 mmHg    80 mmHg    59 bpm    16 rpm    97.6 F    173.25 lbs    69 

in                        25.58 kg/m2    1.96 m2                   100 %

 

        2010    3:02:00 PM    140 mmHg    80 mmHg    61 bpm    16 rpm    97.6 F    173.125 lbs    69

 in                       25.5658 kg/m    1.9553 m                 99 %

 

        2010    1:23:00 PM    130 mmHg    80 mmHg    66 bpm    16 rpm    96.8 F    173 lbs    69 in 

                          25.55 kg/m2    1.95 m2                   100 %

 

        2010    12:58:00 PM    130 mmHg    88 mmHg    75 bpm    16 rpm    98.4 F    172.25 lbs    69

 in                       25.4366 kg/m    1.9503 m                 100 %







Social History







                    Name                Description         Comments

 

                    denies alcohol use                         

 

                    denies smoking                           

 

                    Denies illicit substance abuse                         

 

                    retired                                 direct care

 

                    Single                                   

 

                    Exercises regularly                         

 

                    Attended some college                         

 

                    Tobacco             Never smoker         







History of Procedures







                    Date Ordered        Description         Order Status

 

                    2010 12:00 AM    COMPREHEN METABOLIC PANEL    Reviewed

 

                    2010 12:00 AM    COMPLETE CBC W/AUTO DIFF WBC    Reviewed

 

                    2010 12:00 AM    LIPID PANEL         Reviewed

 

                          2015 12:00 AM        B12 Injection, Up to 1000 Mcg NDC#4242-9141-08 [facility] 

                                        Reviewed

 

                    2011 12:00 AM    MAMMOGRAM SCREENING    Reviewed

 

                    2011 12:00 AM    CYTOPATH C/V THIN LAYER    Reviewed

 

                    2011 12:00 AM    B12 Injection 1 cc NDC#[address]-31    Reviewed

 

                    2015 12:00 AM    THER/PROPH/DIAG INJ SC/IM    Reviewed

 

                    2015 12:00 AM    B12 Injection, Up to 1000 Mcg NDC#4684-0617-06    Reviewed

 

                    2011 12:00 AM    THER/PROPH/DIAG INJ SC/IM    Reviewed

 

                    2011 12:00 AM    B12 Injection([place]) Ndc#5864-2792-74-    Reviewed

 

                    2015 12:00 AM    THER/PROPH/DIAG INJ SC/IM    Reviewed

 

                    2015 12:00 AM    B12 Injection, Up to 1000 Mcg NDC#3723-1312-65    Reviewed

 

                    10/20/2011 12:00 AM    THER/PROPH/DIAG INJ SC/IM    Reviewed

 

                    10/20/2011 12:00 AM    B12 Injection([place]) Ndc#4534-2858-09-    Reviewed

 

                    2016 12:00 AM    THER/PROPH/DIAG INJ SC/IM    Reviewed

 

                    2016 12:00 AM    B12 Injection, Up to 1000 Mcg NDC#0096-4694-85    Reviewed

 

                    3/14/2016 12:00 AM    VITAMIN B-12        Reviewed

 

                    3/15/2016 12:00 AM    THER/PROPH/DIAG INJ SC/IM    Reviewed

 

                    3/15/2016 12:00 AM    B12 Injection, Up to 1000 Mcg NDC#6867-8827-55    Reviewed

 

                    2011 12:00 AM    ***Immunization administration, Medicare flu    Reviewed

 

                    2011 12:00 AM    Fluzone ** MEDICARE Only **    Reviewed

 

                    2011 12:00 AM    THER/PROPH/DIAG INJ SC/IM    Reviewed

 

                    2011 12:00 AM    B12 Injection ([employer] [facility]) Ndc#0925-5362-47    Reviewed

 

                    2016 12:00 AM    B12 Injection, Up to 1000 Mcg NDC#6543-3528-66 [facility]    

Reviewed

 

                    2016 12:00 AM    TTE W/DOPPLER COMPLETE    Reviewed

 

                    2016 12:00 AM    EXTREMITY STUDY     Reviewed

 

                          2016 12:00 AM        B12 Injection, Up to 1000 Mcg NDC#8075-5576-77 [facility] 

                                        Reviewed

 

                    2016 12:00 AM    THER/PROPH/DIAG INJ SC/IM    Reviewed

 

                    2016 12:00 AM    B12 Injection, Up to 1000 Mcg NDC#9471-1002-91    Reviewed

 

                    2016 12:00 AM    THER/PROPH/DIAG INJ SC/IM    Reviewed

 

                    2012 12:00 AM    B12 Injection([place]) Ndc#1167-1305-71-    Reviewed

 

                    2016 12:00 AM    B12 Injection, Up to 1000 Mcg NDC#6054-5717-96    Reviewed

 

                    2016 12:00 AM    THER/PROPH/DIAG INJ SC/IM    Reviewed

 

                    2012 12:00 AM    THER/PROPH/DIAG INJ SC/IM    Reviewed

 

                    2012 12:00 AM    B12 Injection ([employer]) Ndc#6298-0262-96    Reviewed

 

                    2016 12:00 AM    THER/PROPH/DIAG INJ SC/IM    Reviewed

 

                    2016 12:00 AM    B12 Injection, Up to 1000 Mcg NDC#0829-6996-20    Reviewed

 

                    2016 12:00 AM    B12 Injection, Up to 1000 Mcg NDC#6373-3532-49    Reviewed

 

                    2016 12:00 AM    THER/PROPH/DIAG INJ SC/IM    Reviewed

 

                    2012 12:00 AM    THER/PROPH/DIAG INJ SC/IM    Reviewed

 

                    2012 12:00 AM    B12 Injection([place]) Ndc#2210-5522-75-    Reviewed

 

                    12/15/2016 12:00 AM    B12 Injection, Up to 1000 Mcg NDC#4300-8425-28    Reviewed

 

                    12/15/2016 12:00 AM    THER/PROPH/DIAG INJ SC/IM    Reviewed

 

                    2016 12:00 AM    URNLS DIP STICK/TABLET RGNT AUTO W/O MICROSCOPY    Reviewed

 

                    1/3/2017 12:00 AM    URNLS DIP STICK/TABLET RGNT AUTO W/O MICROSCOPY    Reviewed

 

                    2017 12:00 AM    URINE CULTURE/COLONY COUNT    Reviewed

 

                    2017 12:00 AM    Rocephin 1 gram Injection, [facility]    Reviewed

 

                    2017 12:00 AM    THERAPEUTIC PROPHYLACTIC/DX INJECTION SUBQ/IM    Reviewed

 

                    2017 12:00 AM    B12 1000mcg Injection, [facility] [employer]    Reviewed

 

                    5/3/2012 12:00 AM    THER/PROPH/DIAG INJ SC/IM    Reviewed

 

                    5/3/2012 12:00 AM    B12 Injection([place]) Ndc#8363-6300-00-    Reviewed

 

                    2017 12:00 AM    THERAPEUTIC PROPHYLACTIC/DX INJECTION SUBQ/IM    Reviewed

 

                    2017 12:00 AM    B12 1000mcg Injection, [facility]    Reviewed

 

                    2017 12:00 AM    MRI BRAIN STEM W/O & W/DYE    Reviewed

 

                    2017 12:00 AM    VITAMIN B-12        Reviewed

 

                    2017 12:00 AM    Speech Therapy Consult    Reviewed

 

                    2017 12:00 AM    ASSAY OF CREATININE    Reviewed

 

                    2012 12:00 AM    IMMUNOTHERAPY INJECTIONS    Reviewed

 

                    2012 12:00 AM    B12 Injection([place]) Ndc#6245-3988-07-    Reviewed

 

                    2017 12:00 AM    URINALYSIS AUTO W/SCOPE    Reviewed

 

                    2012 12:00 AM    THER/PROPH/DIAG INJ SC/IM    Reviewed

 

                    2012 12:00 AM    B12 Injection, Up to 1000 Mcg NDC#4622-9338-79    Reviewed

 

                    2017 12:00 AM    URINALYSIS AUTO W/SCOPE    Reviewed

 

                    2017 2:18 PM    URINALYSIS AUTO W/O SCOPE    Reviewed

 

                    2017 12:00 AM    URINE CULTURE/COLONY COUNT    Reviewed

 

                    2017 12:00 AM    B12 1000mcg Injection    Reviewed

 

                    2017 12:00 AM    URNLS DIP STICK/TABLET RGNT AUTO W/O MICROSCOPY    Reviewed

 

                    2017 12:00 AM    METABOLIC PANEL TOTAL CA    Reviewed

 

                    2012 12:00 AM    THER/PROPH/DIAG INJ SC/IM    Reviewed

 

                    2012 12:00 AM    B12 Injection, Up to 1000 Mcg NDC#8044-3021-21    Reviewed

 

                    2012 12:00 AM    THER/PROPH/DIAG INJ SC/IM    Reviewed

 

                    2012 12:00 AM    B12 Injection, Up to 1000 Mcg NDC#1464-1252-44    Reviewed

 

                    10/16/2012 12:00 AM    THER/PROPH/DIAG INJ SC/IM    Reviewed

 

                    10/16/2012 12:00 AM    B12 Injection, Up to 1000 Mcg NDC#5670-3621-64    Reviewed

 

                    2010 12:00 AM    COMPREHEN METABOLIC PANEL    Reviewed

 

                    2010 12:00 AM    COMPLETE CBC W/AUTO DIFF WBC    Reviewed

 

                    2010 12:00 AM    LIPID PANEL         Reviewed

 

                    2013 12:00 AM    Flu Injection 3 Years And Above NDC# [address]-14  [facility]    Reviewed



 

                    2013 12:00 AM    COMPLETE CBC W/AUTO DIFF WBC    Reviewed

 

                    2013 12:00 AM    ASSAY OF LITHIUM    Reviewed

 

                    2013 12:00 AM    METABOLIC PANEL TOTAL CA    Reviewed

 

                    4/3/2013 12:00 AM    THER/PROPH/DIAG INJ SC/IM    Reviewed

 

                    4/3/2013 12:00 AM    B12 Injection, Up to 1000 Mcg NDC#9885-7481-68    Reviewed

 

                    2013 12:00 AM    THER/PROPH/DIAG INJ SC/IM    Reviewed

 

                    2013 12:00 AM    B12 Injection, Up to 1000 Mcg NDC#9173-3176-29    Reviewed

 

                    2013 12:00 AM    THER/PROPH/DIAG INJ SC/IM    Reviewed

 

                    2013 12:00 AM    B12 Injection, Up to 1000 Mcg NDC#8533-4736-17    Reviewed

 

                    2013 12:00 AM    LIPID PANEL         Reviewed

 

                    2013 12:00 AM    VITAMIN D 25 HYDROXY    Reviewed

 

                    2013 12:00 AM    THER/PROPH/DIAG INJ SC/IM    Reviewed

 

                    2013 12:00 AM    B12 Injection, Up to 1000 Mcg NDC#6294-8367-80    Reviewed

 

                    2013 12:00 AM    THER/PROPH/DIAG INJ SC/IM    Reviewed

 

                    3/6/2014 12:00 AM    THER/PROPH/DIAG INJ SC/IM    Reviewed

 

                    2014 12:00 AM    THER/PROPH/DIAG INJ SC/IM    Reviewed

 

                    2014 12:00 AM    B12 Injection, Up to 1000 Mcg NDC#5357-6203-56    Reviewed

 

                    2010 12:00 AM    SKIN FUNGI CULTURE    Reviewed

 

                    10/9/2010 12:00 AM    COMPREHEN METABOLIC PANEL    Reviewed

 

                    10/9/2010 12:00 AM    LIPID PANEL         Reviewed

 

                    2010 12:00 AM    THER/PROPH/DIAG INJ SC/IM    Reviewed

 

                    2010 12:00 AM    B12 Injection Ndc#[address]-06 ([place])    Reviewed

 

                    2010 12:00 AM    THER/PROPH/DIAG INJ SC/IM    Reviewed

 

                    2010 12:00 AM    Kenalog 40 Mg Im-Ndc#[address]-31 ([place])    Reviewed

 

                    10/15/2010 12:00 AM    FLU VACCINE 3 YRS & > IM    Reviewed

 

                    10/15/2010 12:00 AM    Admin.Of M/C Cov.Vaccine-Flu Vacc.    Reviewed

 

                    1/15/2011 12:00 AM    COMPLETE CBC W/AUTO DIFF WBC    Reviewed

 

                    1/15/2011 12:00 AM    COMPREHEN METABOLIC PANEL    Reviewed

 

                    1/15/2011 12:00 AM    LIPID PANEL         Reviewed

 

                    2014 12:00 AM    MAMMOGRAM SCREENING    Reviewed

 

                    2014 12:00 AM    Screening mammography, bilateral    Reviewed

 

                    7/10/2014 12:00 AM    THER/PROPH/DIAG INJ SC/IM    Reviewed

 

                    7/10/2014 12:00 AM    B12 Injection, Up to 1000 Mcg NDC#2354-8762-94    Reviewed

 

                    2011 12:00 AM    COMPLETE CBC W/AUTO DIFF WBC    Reviewed

 

                    2011 12:00 AM    COMPREHEN METABOLIC PANEL    Reviewed

 

                    2011 12:00 AM    LIPID PANEL         Reviewed

 

                    2014 12:00 AM    B12 Injection, Up to 1000 Mcg NDC#6044-6241-45    Reviewed

 

                    10/19/2014 12:00 AM    MAMMOGRAM SCREENING    Reviewed

 

                    10/19/2014 12:00 AM    Screening mammography, bilateral    Reviewed

 

                    10/16/2014 12:00 AM    B12 Injection, Up to 1000 Mcg NDC#4239-3454-24    Reviewed

 

                    10/16/2014 12:00 AM    COMPLETE CBC W/AUTO DIFF WBC    Reviewed

 

                    10/16/2014 12:00 AM    COMPREHEN METABOLIC PANEL    Reviewed

 

                    10/16/2014 12:00 AM    IMMUNOASSAY TUMOR     Reviewed

 

                    10/16/2014 12:00 AM    LIPID PANEL         Reviewed

 

                    10/16/2014 12:00 AM    ASSAY OF LITHIUM    Reviewed

 

                    10/16/2014 12:00 AM    MAMMOGRAM SCREENING    Reviewed

 

                    2011 12:00 AM    ASSAY OF PARATHORMONE    Reviewed

 

                    2011 12:00 AM    VITAMIN D 25 HYDROXY    Reviewed

 

                    2011 12:00 AM    ASSAY OF LITHIUM    Reviewed

 

                    2011 12:00 AM    METABOLIC PANEL TOTAL CA    Reviewed

 

                    2011 12:00 AM    CT HEAD/BRAIN W/O & W/DYE    Reviewed

 

                    3/23/2015 12:00 AM    PNEUMOCOCCAL VACC 13 GLENDY IM    Reviewed

 

                    3/23/2015 12:00 AM    Vitamin B12 injection    Reviewed

 

                    2011 12:00 AM    ASSAY OF LITHIUM    Reviewed

 

                    2011 12:00 AM    B12 Injection Ndc#[address]-70  [place]    Reviewed

 

                    2015 12:00 AM    THER/PROPH/DIAG INJ SC/IM    Reviewed

 

                    2015 12:00 AM    B12 Injection, Up to 1000 Mcg NDC#1606-4088-80    Reviewed

 

                    2015 12:00 AM    COMPLETE CBC W/AUTO DIFF WBC    Reviewed

 

                    2015 12:00 AM    COMPREHEN METABOLIC PANEL    Reviewed

 

                    2015 12:00 AM    LIPID PANEL         Reviewed

 

                    2015 12:00 AM    ASSAY OF LITHIUM    Reviewed

 

                    2011 12:00 AM    VIT D 1 25-DIHYDROXY    Reviewed

 

                    2011 12:00 AM    VITAMIN B-12        Reviewed

 

                    2015 12:00 AM    B12 Injection, Up to 1000 Mcg NDC#0199-9886-47    Reviewed

 

                    2015 12:00 AM    THER/PROPH/DIAG INJ SC/IM    Reviewed

 

                    2015 12:00 AM    B12 Injection, Up to 1000 Mcg NDC#4897-6715-43    Reviewed

 

                    2011 12:00 AM    THER/PROPH/DIAG INJ SC/IM    Reviewed

 

                    2011 12:00 AM    B12 Injection ([employer]) Ndc#7079-6805-86    Reviewed

 

                    2015 12:00 AM    THER/PROPH/DIAG INJ SC/IM    Reviewed

 

                    2015 12:00 AM    B12 Injection, Up to 1000 Mcg NDC#6661-7394-65    Reviewed







Results Summary







                          Date and Description      Results

 

                          2004 12:00 AM        Colonoscopy-Women and Men over 50 Normal 

 

                          2008 12:00 AM         Pap Smear Declined 

 

                          10/7/2009 12:00 AM        Cholest Cry Stone Ql .0 %LDLc SerPl-mCnc 123.0 mg/dLHDLc

 SerPl-mCnc 34.0 mg/dLTrigl SerPl-mCnc 190.0 mg/dLGlucose SerPl-mCnc 78.0 mg/dL

 

                          2009 12:00 AM        Mammogram -Women over 40 Normal HIV1+2 Ab Ser Ql no risk 

 

                          2010 8:47 AM         Dexa Bone Scan Refused Aspirin reccommended Contraindication 



 

                          2010 8:48 AM         Depression Done 

 

                          2010 12:00 AM         Foot Exam-Diabetic Done 

 

                          2010 12:00 AM         Cholest Cry Stone Ql .0 %LDLc SerPl-mCnc 126.0 mg/dLGlucose

 SerPl-mCnc 102.0 mg/dL

 

                          2010 8:45 AM          TRIGLYCERIDES 122.0 mg/dLCHOLESTEROL 186.0 mg/dLHDL 36.0 mg/dLTOT

 CHOL/HDL 5.2 LDL (CALC) 126.0 mg/dLGLUCOSE 102.0 mg/dLSODIUM 143.0 
mmol/LPOTASSIUM 3.70 mmol/LCHLORIDE 111.0 mmol/LCO2 23.0 mmol/LBUN 10.0 
mg/dLCREATININE 0.80 mg/dLSGOT/AST 12.0 IU/LSGPT/ALT 11.0 IU/LALK PHOS 65.0 
IU/LTOTAL PROTEIN 7.20 g/dLALBUMIN 3.90 g/dLTOTAL BILI 0.50 mg/dLCALCIUM 10.20 
mg/dLAGE 59 GFR NonAA 73 GFR AA 88 eGFR >60 mL/min/1.73 m2eGFR AA* >60 WBC 5.7 
RBC 3.26 HGB 10.60 g/dLHCT 31.70 %MCV 97.0 fLMCH 32.50 pgMCHC 33.40 g/dLRDW SD 
47 RDW CV 13.30 %MPV 9.70 fLPLT 287 NRBC# 0.00 NRBC% 0.0 %NEUT 62.90 %%LYMP 
21.80 %%MONO 9.90 %%EOS 5.0 %%BASO 0.40 %#NEUT 3.56 #LYMP 1.23 #MONO 0.56 #EOS 
0.28 #BASO 0.02 MANUAL DIFF NOT IND 

 

                          2010 12:00 AM        Glucose SerPl-mCnc 96.0 mg/dLCholest Cry Stone Ql .0 %LDLc

 SerPl-mCnc 146.0 mg/dL

 

                          2010 8:26 AM         TRIGLYCERIDES 106.0 mg/dLCHOLESTEROL 199.0 mg/dLHDL 32.0 mg/dLTOT

 CHOL/HDL 6.2 LDL (CALC) 146.0 mg/dLGLUCOSE 96.0 mg/dLSODIUM 143.0 
mmol/LPOTASSIUM 4.0 mmol/LCHLORIDE 113.0 mmol/LCO2 24.0 mmol/LBUN 13.0 
mg/dLCREATININE 1.0 mg/dLSGOT/AST 11.0 IU/LSGPT/ALT 6.0 IU/LALK PHOS 56.0 
IU/LTOTAL PROTEIN 6.60 g/dLALBUMIN 3.80 g/dLTOTAL BILI 0.50 mg/dLCALCIUM 9.30 
mg/dLAGE 59 GFR NonAA 57 GFR AA 69 eGFR 57 eGFR AA* >60 

 

                          10/6/2010 12:00 AM        Cholest Cry Stone Ql .0 %LDLc SerPl-mCnc 111.0 mg/dLGlucose

 SerPl-mCnc 81.0 mg/dL

 

                          10/6/2010 2:45 PM         TRIGLYCERIDES 123.0 mg/dLCHOLESTEROL 178.0 mg/dLHDL 42.0 mg/dLTOT

 CHOL/HDL 4.2 LDL (CALC) 111.0 mg/dLGLUCOSE 81.0 mg/dLSODIUM 139.0 
mmol/LPOTASSIUM 4.10 mmol/LCHLORIDE 106.0 mmol/LCO2 24.0 mmol/LBUN 13.0 
mg/dLCREATININE 0.90 mg/dLSGOT/AST 13.0 IU/LSGPT/ALT 11.0 IU/LALK PHOS 61.0 
IU/LTOTAL PROTEIN 7.10 g/dLALBUMIN 3.90 g/dLTOTAL BILI 0.30 mg/dLCALCIUM 9.30 
mg/dLAGE 60 GFR NonAA 64 GFR AA 78 eGFR >60 mL/min/1.73 m2eGFR AA* >60 WBC 6.9 
RBC 3.59 HGB 11.50 g/dLHCT 35.30 %MCV 98.0 fLMCH 32.0 pgMCHC 32.60 g/dLRDW SD 46
 RDW CV 12.90 %MPV 9.90 fLPLT 311 NRBC# 0.00 NRBC% 0.0 %NEUT 64.90 %%LYMP 22.50 
%%MONO 7.20 %%EOS 5.10 %%BASO 0.30 %#NEUT 4.45 #LYMP 1.54 #MONO 0.49 #EOS 0.35 
#BASO 0.02 MANUAL DIFF NOT IND 

 

                          2011 12:00 AM         Mammogram -Women over 40 Ordered 

 

                          2011 10:25 AM        TRIGLYCERIDES 111.0 mg/dLCHOLESTEROL 195.0 mg/dLHDL 43.0 mg/dLTOT

 CHOL/HDL 4.5 LDL (CALC) 130.0 mg/dLWBC 5.3 RBC 3.76 HGB 12.0 g/dLHCT 37.80 %MCV
 101.0 fLMCH 31.90 pgMCHC 31.70 g/dLRDW SD 47 RDW CV 13.0 %MPV 9.70 fLPLT 259 
NRBC# 0.00 NRBC% 0.0 %NEUT 69.0 %%LYMP 17.60 %%MONO 8.30 %%EOS 4.70 %%BASO 0.40 
%#NEUT 3.63 #LYMP 0.93 #MONO 0.44 #EOS 0.25 #BASO 0.02 MANUAL DIFF NOT IND 
GLUCOSE 102.0 mg/dLSODIUM 146.0 mmol/LPOTASSIUM 4.20 mmol/LCHLORIDE 113.0 
mmol/LCO2 23.0 mmol/LBUN 15.0 mg/dLCREATININE 1.0 mg/dLSGOT/AST 12.0 
IU/LSGPT/ALT 17.0 IU/LALK PHOS 60.0 IU/LTOTAL PROTEIN 6.90 g/dLALBUMIN 4.20 
g/dLTOTAL BILI 0.40 mg/dLCALCIUM 9.70 mg/dLAGE 60 GFR NonAA 57 GFR AA 69 eGFR 57
 eGFR AA* >60 

 

                          2011 11:49 AM        Cholest Cry Stone Ql .0 %LDLc SerPl-mCnc 130.0 mg/dLHDLc

 SerPl-mCnc 43.0 mg/dLTrigl SerPl-mCnc 111.0 mg/dLGlucose SerPl-mCnc 102.0 mg/dL

 

                          2011 11:52 AM        Pap Smear Declined 

 

                          2011 11:28 AM        Lithium 2.080 mmol/LGLUCOSE 102.0 mg/dLSODIUM 135.0 mmol/LPOTASSIUM

 3.90 mmol/LCHLORIDE 106.0 mmol/LCO2 21.0 mmol/LBUN 12.0 mg/dLCREATININE 1.30 
mg/dLCALCIUM 10.70 mg/dLAGE 60 GFR NonAA 42 GFR AA 51 eGFR 42 eGFR AA* 51 

 

                          2011 8:58 AM          Lithium 0.690 mmol/L

 

                          2011 2:38 PM         VITAMIN B12 3483.0 pg/mL

 

                          2013 3:35 PM          WBC 5.1 RBC 3.73 HGB 11.70 g/dLHCT 36.40 %MCV 98.0 fLMCH 31.40

 pgMCHC 32.10 g/dLRDW SD 47 RDW CV 13.10 %MPV 9.80 fLPLT 224 NRBC# 0.00 NRBC% 
0.0 %NEUT 66.80 %%LYMP 19.10 %%MONO 9.0 %%EOS 4.90 %%BASO 0.20 %#NEUT 3.42 #LYMP
 0.98 #MONO 0.46 #EOS 0.25 #BASO 0.01 MANUAL DIFF NOT IND GLUCOSE 88.0 
mg/dLSODIUM 141.0 mmol/LPOTASSIUM 4.10 mmol/LCHLORIDE 110.0 mmol/LCO2 22.0 
mmol/LBUN 22.0 mg/dLCREATININE 1.10 mg/dLCALCIUM 9.80 mg/dLAGE 62 GFR NonAA 50 
GFR AA 61 eGFR 50 eGFR AA* 60 Lithium 0.760 mmol/L

 

                          2013 11:02 AM        TRIGLYCERIDES 106.0 mg/dLCHOLESTEROL 181.0 mg/dLHDL 46.0 mg/dLTOT

 CHOL/HDL 3.9 LDL (CALC) 114.0 mg/dLVITAMIN D 41.10 ng/mL

 

                          10/17/2014 10:10 AM       WBC 5.0 RBC 3.66 HGB 11.60 g/dLHCT 36.80 %.0 fLMCH 31.70

 pgMCHC 31.50 g/dLRDW SD 50 RDW CV 13.50 %MPV 10.10 fLPLT 209 NRBC# 0.00 NRBC% 
0.0 %NEUT 69.20 %%LYMP 21.0 %%MONO 6.40 %%EOS 3.20 %%BASO 0.20 %#NEUT 3.46 #LYMP
 1.05 #MONO 0.32 #EOS 0.16 #BASO 0.01 MANUAL DIFF NOT IND GLUCOSE 100.0 
mg/dLSODIUM 148.0 mmol/LPOTASSIUM 3.90 mmol/LCHLORIDE 114.0 mmol/LCO2 26.0 
mmol/LBUN 12.0 mg/dLCREATININE 1.20 mg/dLSGOT/AST 9.0 IU/LSGPT/ALT <6 IU/LALK 
PHOS 82.0 IU/LTOTAL PROTEIN 6.90 g/dLALBUMIN 4.0 g/dLTOTAL BILI 0.40 
mg/dLCALCIUM 10.50 mg/dLAGE 64 GFR NonAA 45 GFR AA 55 eGFR 45 eGFR AA* 55 
TRIGLYCERIDES 96.0 mg/dLCHOLESTEROL 155.0 mg/dLHDL 38.0 mg/dLTOT CHOL/HDL 4.1 
LDL (CALC) 98.0 mg/dLLithium 0.850 mmol/LCancer Antigen (CA) 125 8.30 U/mL

 

                          2015 10:25 AM        Lithium 0.790 mmol/LWBC 4.8 RBC 3.44 HGB 11.0 g/dLHCT 35.20 

%.0 fLMCH 32.0 pgMCHC 31.30 g/dLRDW SD 53 RDW CV 14.0 %MPV 9.30 fLPLT 210
 NRBC# 0.00 NRBC% 0.0 %NEUT 70.80 %%LYMP 17.20 %%MONO 8.10 %%EOS 3.50 %%BASO 
0.40 %#NEUT 3.41 #LYMP 0.83 #MONO 0.39 #EOS 0.17 #BASO 0.02 MANUAL DIFF NOT IND 
TRIGLYCERIDES 107.0 mg/dLCHOLESTEROL 174.0 mg/dLHDL 43.0 mg/dLTOT CHOL/HDL 4.0 
LDL (CALC) 110.0 mg/dLGLUCOSE 90.0 mg/dLSODIUM 145.0 mmol/LPOTASSIUM 3.80 
mmol/LCHLORIDE 115.0 mmol/LCO2 24.0 mmol/LBUN 17.0 mg/dLCREATININE 1.30 
mg/dLSGOT/AST 18.0 IU/LSGPT/ALT 17.0 IU/LALK PHOS 56.0 IU/LTOTAL PROTEIN 6.70 
g/dLALBUMIN 3.90 g/dLTOTAL BILI 0.40 mg/dLCALCIUM 9.80 mg/dLAGE 64 GFR NonAA 41 
GFR AA 50 eGFR 41 eGFR AA* 50 

 

                          2015 8:50 AM        WBC 5.8 RBC 3.29 HGB 10.70 g/dLHCT 34.0 %.0 fLMCH 32.50

 pgMCHC 31.50 g/dLRDW SD 52 RDW CV 13.60 %MPV 9.60 fLPLT 223 NRBC# 0.00 NRBC% 
0.0 %NEUT 69.60 %%LYMP 18.90 %%MONO 8.50 %%EOS 2.80 %%BASO 0.20 %#NEUT 4.03 
#LYMP 1.09 #MONO 0.49 #EOS 0.16 #BASO 0.01 MANUAL DIFF NOT IND Lithium 0.620 
mmol/LGLUCOSE 83.0 mg/dLSODIUM 139.0 mmol/LPOTASSIUM 3.90 mmol/LCHLORIDE 109.0 
mmol/LCO2 22.0 mmol/LBUN 19.0 mg/dLCREATININE 1.40 mg/dLSGOT/AST 19.0 
IU/LSGPT/ALT 21.0 IU/LALK PHOS 55.0 IU/LTOTAL PROTEIN 6.50 g/dLALBUMIN 3.90 
g/dLTOTAL BILI 0.50 mg/dLCALCIUM 9.60 mg/dLAGE 65 GFR NonAA 38 GFR AA 46 eGFR 38
 eGFR AA* 46 TRIGLYCERIDES 121.0 mg/dLCHOLESTEROL 192.0 mg/dLHDL 51.0 mg/dLTOT 
CHOL/HDL 3.8 .0 mg/dLFREE T4 0.79 TSH 1.210 uIU/mLHemoglobin A1c 5.40 
%Estim. Avg Glu (eAG) 108 

 

                          3/15/2016 8:08 AM         VITAMIN B12 696.0 pg/mL

 

                          3/23/2016 8:26 AM         WBC 7.0 RBC 3.61 HGB 11.80 g/dLHCT 37.70 %.0 fLMCH 32.70

 pgMCHC 31.30 g/dLRDW SD 49 RDW CV 12.50 %MPV 10.0 fLPLT 207 NRBC# 0.00 NRBC% 
0.0 %NEUT 73.60 %%LYMP 16.40 %%MONO 6.60 %%EOS 3.0 %%BASO 0.30 %#NEUT 5.15 #LYMP
 1.15 #MONO 0.46 #EOS 0.21 #BASO 0.02 MANUAL DIFF NOT IND Lithium 0.940 
mmol/LGLUCOSE 108.0 mg/dLSODIUM 143.0 mmol/LPOTASSIUM 4.30 mmol/LCHLORIDE 110.0 
mmol/LCO2 27.0 mmol/LBUN 16.0 mg/dLCREATININE 1.60 mg/dLSGOT/AST 13.0 
IU/LSGPT/ALT 7.0 IU/LALK PHOS 71.0 IU/LTOTAL PROTEIN 6.80 g/dLALBUMIN 4.0 
g/dLTOTAL BILI 0.20 mg/dLCALCIUM 10.40 mg/dLAGE 65 GFR NonAA 32 GFR AA 39 eGFR 
32 eGFR AA* 39 TRIGLYCERIDES 113.0 mg/dLCHOLESTEROL 169.0 mg/dLHDL 42.0 mg/dLTOT
 CHOL/HDL 4.0 LDL (CALC) 104.0 mg/dLFREE T4 0.86 TSH 2.20 uIU/mLHemoglobin A1c 
5.20 %Estim. Avg Glu (eAG) 103 

 

                          3/25/2016 9:17 AM         COLOR YELLOW APPEARANCE CLEAR SPEC GRAV 1.010 pH 7.0 PROTEIN 

NEGATIVE GLUCOSE NEGATIVE mg/dLKETONE NEGATIVE BILIRUBIN NEGATIVE BLOOD NEGATIVE
 NITRITE NEGATIVE [place] SCREEN SMALL MICRO IND? SEE BELOW WBC/HPF 0-5 RBC/HPF 
NEGATIVE CASTS/LPF NEGATIVE /LPFCRYSTALS NEGATIVE MUCOUS THRDS NEGATIVE BACTERIA
 NEGATIVE EPITH CELLS FEW SQUAMOUS /HPFTRICHOMONAS NEGATIVE YEAST NEGATIVE 

 

                          2016 6:00 AM        GLUCOSE 91.0 mg/dLSODIUM 143.0 mmol/LPOTASSIUM 3.60 mmol/LCHLORIDE

 112.0 mmol/LCO2 23.0 mmol/LBUN 22.0 mg/dLCREATININE 1.20 mg/dLSGOT/AST 15.0 
IU/LSGPT/ALT 12.0 IU/LALK PHOS 61.0 IU/LTOTAL PROTEIN 5.40 g/dLALBUMIN 3.10 
g/dLTOTAL BILI 0.40 mg/dLCALCIUM 8.40 mg/dLAGE 66 GFR NonAA 45 GFR AA 55 eGFR 45
 eGFR AA* 55 WBC 3.0 RBC 3.05 HGB 9.80 g/dLHCT 32.10 %.0 fLMCH 32.10 
pgMCHC 30.50 g/dLRDW SD 54 RDW CV 14.20 %MPV 10.10 fLPLT 170 NRBC# 0.00 NRBC% 
0.0 %NEUT 50.70 %%LYMP 32.60 %%MONO 10.50 %%EOS 5.90 %%BASO 0.30 %#NEUT 1.54 
#LYMP 0.99 #MONO 0.32 #EOS 0.18 #BASO 0.01 MANUAL DIFF NOT IND 

 

                          2016 2:09 PM        COLOR YELLOW APPEARANCE CLEAR SPEC GRAV 1.010 pH 5.0 PROTEIN

 30 GLUCOSE NEGATIVE mg/dLKETONE NEGATIVE BILIRUBIN NEGATIVE BLOOD LARGE NITRITE
 NEGATIVE [place] SCREEN MODERATE MICRO INDICATED? SEE BELOW WBC/HPF  RBC/HPF
 20-50 CASTS/LPF NEGATIVE /LPFCRYSTALS NEGATIVE MUCOUS THRDS NEGATIVE BACTERIA 
NEGATIVE EPITH CELLS FEW SQUAMOUS /HPFTRICHOMONAS NEGATIVE YEAST NEGATIVE CULT 
SET UP? YES 

 

                          1/3/2017 4:08 PM          COLOR YELLOW APPEARANCE HAZY SPEC GRAV 1.015 pH 6.0 PROTEIN 30

 GLUCOSE NEGATIVE mg/dLKETONE NEGATIVE BILIRUBIN NEGATIVE BLOOD MODERATE NITRITE
 NEGATIVE [place] SCREEN LARGE MICRO INDICATED? SEE BELOW WBC/-200 RBC/HPF 
5-10 CASTS/LPF NEGATIVE /LPFCRYSTALS NEGATIVE MUCOUS THRDS NEGATIVE BACTERIA 
NEGATIVE EPITH CELLS 1+ SQUAMOUS /HPFTRICHOMONAS NEGATIVE YEAST NEGATIVE CULT 
SET UP? YES 

 

                          2017 4:24 PM         CREATININE 1.50 mg/dLAGE 66 GFR NonAA 35 GFR AA 42 eGFR 35 eGFR

 AA* 42 VITAMIN B12 1324.0 pg/mL

 

                          2017 11:30 AM         GLUCOSE 93.0 mg/dLSODIUM 143.0 mmol/LPOTASSIUM 3.10 mmol/LCHLORIDE

 101.0 mmol/LCO2 29.0 mmol/LBUN 26.0 mg/dLCREATININE 1.50 mg/dLSGOT/AST 23.0 
IU/LSGPT/ALT 13.0 IU/LALK PHOS 66.0 IU/LTOTAL PROTEIN 7.70 g/dLALBUMIN 4.30 
g/dLTOTAL BILI 0.40 mg/dLCALCIUM 10.30 mg/dLAGE 66 GFR NonAA 35 GFR AA 42 eGFR 
35 eGFR AA* 42 TRIGLYCERIDES 147.0 mg/dLCHOLESTEROL 184.0 mg/dLHDL 44.0 mg/dLTOT
 CHOL/HDL 4.2 .0 mg/dLWBC 5.4 RBC 3.98 HGB 12.90 g/dLHCT 40.20 %.0
 fLMCH 32.40 pgMCHC 32.10 g/dLRDW SD 50 RDW CV 13.50 %MPV 9.30 fLPLT 210 NRBC# 
0.00 NRBC% 0.0 %NEUT 54.20 %%LYMP 30.70 %%MONO 9.10 %%EOS 5.20 %%BASO 0.40 
%#NEUT 2.94 #LYMP 1.66 #MONO 0.49 #EOS 0.28 #BASO 0.02 MANUAL DIFF NOT IND FREE 
T4 1.09 COLOR YELLOW APPEARANCE CLEAR SPEC GRAV <=1.005 pH 5.5 PROTEIN NEGATIVE 
GLUCOSE NEGATIVE mg/dLKETONE NEGATIVE BILIRUBIN NEGATIVE BLOOD NEGATIVE NITRITE 
NEGATIVE [place] SCREEN LARGE MICRO INDICATED? SEE BELOW WBC/HPF 10-20 RBC/HPF 0-5 
CASTS/LPF NEGATIVE /LPFCRYSTALS NEGATIVE MUCOUS THRDS NEGATIVE BACTERIA FEW 
EPITH CELLS 1+ SQUAMOUS /HPFTRICHOMONAS NEGATIVE YEAST NEGATIVE CULT SET UP? YES
 TSH 1.820 uIU/mL

 

                          2017 2:45 PM         COLOR YELLOW APPEARANCE CLEAR SPEC GRAV <=1.005 pH 6.0 PROTEIN

 NEGATIVE GLUCOSE NEGATIVE mg/dLKETONE NEGATIVE BILIRUBIN NEGATIVE BLOOD TRACE-
INTACT NITRITE NEGATIVE [place] SCREEN SMALL MICRO INDICATED? SEE BELOW WBC/HPF 0-5
 RBC/HPF NEGATIVE CASTS/LPF NEGATIVE /LPFCRYSTALS NEGATIVE MUCOUS THRDS NEGATIVE
 BACTERIA FEW EPITH CELLS FEW SQUAMOUS /HPFTRICHOMONAS NEGATIVE YEAST NEGATIVE 
CULT SET UP? YES 

 

                          2017 11:22 AM        COLOR YELLOW APPEARANCE CLEAR SPEC GRAV <=1.005 pH 6.5 PROTEIN

 NEGATIVE GLUCOSE NEGATIVE mg/dLKETONE NEGATIVE BILIRUBIN NEGATIVE BLOOD 
NEGATIVE NITRITE NEGATIVE LEUK SCREEN NEGATIVE MICRO INDICATED? NOT INDICATED 

 

                          2017 2:18 PM         Clarity Ur cloudy Color Ur dark yellow Glucose Ur-sCnc negative

 Bilirub Ur Ql Strip negative Ketones Ur Ql Strip negative Sp Gr Ur Qn 1.010 Hgb
 Ur Ql Strip trace-intact pH Ur-LsCnc 6.5 Prot Ur Ql Strip trace Urobilinogen 
Ur-mCnc 0.2 Nitrite Ur Ql Strip negative WBC Est Ur Ql Strip large 

 

                          2017 9:28 AM         GLUCOSE 109.0 mg/dLSODIUM 142.0 mmol/LPOTASSIUM 3.60 mmol/LCHLORIDE

 106.0 mmol/LCO2 23.0 mmol/LBUN 11.0 mg/dLCREATININE 1.30 mg/dLCALCIUM 9.80 
mg/dLAGE 66 GFR NonAA 41 GFR AA 50 eGFR 41 eGFR AA* 50 

 

                          2017 9:52 AM         COLOR YELLOW APPEARANCE CLOUDY SPEC GRAV <=1.005 pH 6.5 PROTEIN

 NEGATIVE GLUCOSE NEGATIVE mg/dLKETONE NEGATIVE BILIRUBIN NEGATIVE BLOOD SMALL 
NITRITE POSITIVE [place]UK SCREEN LARGE MICRO INDICATED? SEE BELOW WBC/-300 
RBC/HPF 5-10 CASTS/LPF NEGATIVE /LPFCRYSTALS NEGATIVE MUCOUS THRDS NEGATIVE 
BACTERIA 2++ EPITH CELLS 1+ SQUAMOUS /HPFTRICHOMONAS NEGATIVE YEAST NEGATIVE 
CULT SET UP? YES 







History Of Immunizations







       Name    Date Admin    Mfg Name    Mfg Code    Trade Name    Lot#    Route    Inj    Vis Given    Vis

 Pub                                    CVX

 

        Influenza    2008    Not Entered    NE      Not Entered            Not Entered    Not Entered

                    1            999

 

        X       12/19/2008    [employer]    MSD     Pneumovax 23            Intramuscular    Not Entered

                    1            999

 

           Influenza    10/15/2010    [employer]    NOV        Fluvirin > 12 Years    

[id number]     Intramuscular    Left Deltoid    10/15/2010    2009    999

 

          X         3/23/2015    [facility]    [facility]       Prevnar 13    [id number]    Intramuscular

                Right Gluteous Medius    3/23/2015       2013       109







History of Past Illness







                    Name                Date of Onset       Comments

 

                    Peritoneal Neoplasm, Malignant                         

 

                    Hyperlipidemia                           

 

                    Bipolar disorder, unspecified                         

 

                    Artificial opening status; colostomy                         

 

                    B12 deficiency                           

 

                    Anemia, Pernicious                         

 

                    Arthritis unspecified                         

 

                    cervical cancer                          

 

                    Artificial opening status; colostomy    2010  1:10PM     

 

                    Bipolar disorder, unspecified    2010  1:10PM     

 

                    Hyperlipidemia      2010  1:10PM     

 

                    Anemia, Pernicious    2010  1:10PM     

 

                    Postoperative Follow-Up    2010  1:55PM     

 

                    Postoperative Follow-Up    Mar  8 2010 10:57AM     

 

                    Artificial opening status; colostomy    Mar  8 2010  1:19PM     

 

                    Peritoneal Neoplasm, Malignant    Mar  8 2010  1:19PM     

 

                    Artificial opening status; colostomy    2010  1:40PM     

 

                    Hyperlipidemia      2010  1:40PM     

 

                    Anemia, Pernicious    2010  1:40PM     

 

                    Peritoneal Neoplasm, Malignant    2010  1:40PM     

 

                    Arthritis unspecified    2010  1:40PM     

 

                    Anemia of Chronic Illness    2010  1:40PM     

 

                    Tinea corporis      2010  3:17PM     

 

                    Bipolar disorder, unspecified    2010  1:33PM     

 

                    Hyperlipidemia      2010  1:33PM     

 

                    Anemia, Pernicious    2010  1:33PM     

 

                    Peritoneal Neoplasm, Malignant    2010  1:33PM     

 

                    B12 deficiency      2010  1:33PM     

 

                    Ethmoidal Sinusitis, Acute    Sep 21 2010  3:53PM     

 

                    Wheezing            Sep 21 2010  3:53PM     

 

                    Flu                 Oct 15 2010  1:40PM     

 

                    Bipolar disorder, unspecified    Oct 15 2010  1:42PM     

 

                    Hyperlipidemia      Oct 15 2010  1:42PM     

 

                    Anemia, Pernicious    Oct 15 2010  1:42PM     

 

                    Peritoneal Neoplasm, Malignant    Oct 15 2010  1:42PM     

 

                    Bipolar disorder, unspecified    2011 12:01PM     

 

                    Hyperlipidemia      2011 12:01PM     

 

                    Anemia, Pernicious    2011 12:01PM     

 

                    Peritoneal Neoplasm, Malignant    2011 12:01PM     

 

                    Bipolar disorder, unspecified    Apr 15 2011 10:55AM     

 

                    Major Depression    2011 10:11AM     

 

                    Bipolar Disorder    2011 10:11AM     

 

                    Cancer              May 10 2011  4:16PM     

 

                    Major Depression    May 10 2011  3:16PM     

 

                    Bipolar Disorder    May 10 2011  3:16PM     

 

                    Hypercalcemia       May 23 2011  2:47PM     

 

                    Bipolar disorder, unspecified    May 23 2011  2:47PM     

 

                    Colon Cancer, Personal History    May 23 2011  2:47PM     

 

                    Bipolar Disorder    May 31 2011  4:39PM     

 

                    Depressive Disorder    2011 10:01AM     

 

                    Vitamin B12 deficiency    2011 10:01AM     

 

                    Vitamin D Deficiency    2011  5:07PM     

 

                    Anemia, Vitamin B12 Deficiency    2011  5:07PM     

 

                    B12 deficiency      2011  3:56PM     

 

                    Routine gynecological examination    Aug  4 2011  9:08AM     

 

                    Screening Examination for Breast Cancer    Aug  4 2011  9:08AM     

 

                    Tinea Corporis      Aug  4 2011  9:08AM     

 

                    Depressive Disorder    Sep 23 2011  8:47AM     

 

                    Contact Dermatitis    Sep 23 2011  8:47AM     

 

                    Anemia, Pernicious    Sep 23 2011  8:47AM     

 

                    B12 deficiency      Sep 23 2011  8:47AM     

 

                    B12 deficiency      Sep 27 2011  2:58PM     

 

                    B12 deficiency      Oct 20 2011  2:34PM     

 

                    Flu                 Dec  9 2011  3:16PM     

 

                    B12 deficiency      Dec  9 2011  3:17PM     

 

                    B12 deficiency      2012  4:52PM     

 

                    B12 deficiency      Feb 2012 11:10AM     

 

                    B12 deficiency      2012  3:37PM     

 

                    B12 deficiency      May  3 2012  4:10PM     

 

                    B12 deficiency      2012  2:54PM     

 

                    B12 deficiency      2012 11:23AM     

 

                    B12 deficiency      Aug  9 2012  2:08PM     

 

                    B12 deficiency      Sep  6 2012  4:36PM     

 

                    B12 deficiency      Oct 16 2012 10:23AM     

 

                    Flu                 Feb  4   3:11PM     

 

                    Bipolar disorder, unspecified    Feb  2013  2:48PM     

 

                    Anemia, Pernicious    Feb  2013  2:48PM     

 

                    B12 deficiency      Feb  2013  2:48PM     

 

                    Extrapyramidal abnormal movement disorder    Feb  2013  2:48PM     

 

                    B12 deficiency      Apr  3 2013 12:03PM     

 

                    Bipolar disorder, unspecified    May  7 2013  1:31PM     

 

                    Anemia, Pernicious    May  7 2013  1:31PM     

 

                    B12 deficiency      May  7 2013  1:31PM     

 

                    Extrapyramidal abnormal movement disorder    May  7 2013  1:31PM     

 

                    B12 deficiency      2013  3:42PM     

 

                    B12 deficiency      2013  1:31PM     

 

                    Hyperlipidemia      Aug  7 2013 10:37AM     

 

                    Vitamin D Deficiency    Aug  7 2013 10:37AM     

 

                    Bipolar disorder, unspecified    Aug  7 2013 10:37AM     

 

                    Anemia, Pernicious    Aug  7 2013 10:37AM     

 

                    B12 deficiency      Aug  7 2013 10:37AM     

 

                    B12 deficiency      Sep 25 2013 11:15AM     

 

                    B12 deficiency      Dec 11 2013  3:16PM     

 

                    B12 deficiency      Mar  6 2014  1:48PM     

 

                    B12 deficiency      May 21 2014  3:17PM     

 

                    Screening Examination for Breast Cancer    2014  3:23PM     

 

                    Periumbilical abdominal pain    2014  3:23PM     

 

                    B12 deficiency      Jul 10 2014  2:52PM     

 

                    Anemia, Vitamin B12 Deficiency    Aug 13 2014  4:50PM     

 

                    Bipolar disorder    Oct 16 2014 11:13AM     

 

                    Hyperlipidemia      Oct 16 2014 11:13AM     

 

                    Anemia, Pernicious    Oct 16 2014 11:13AM     

 

                    Peritoneal Neoplasm, Malignant    Oct 16 2014 11:13AM     

 

                    Screening breast examination    Oct 16 2014 11:13AM     

 

                    Weight loss         Oct 16 2014 11:13AM     

 

                    Anemia, Pernicious    Mar 23 2015  2:57PM     

 

                    B12 deficiency      Mar 23 2015  2:57PM     

 

                    Need for Prevnar vaccine    Mar 23 2015  2:57PM     

 

                    Bipolar disorder    Mar 23 2015  2:57PM     

 

                    Hyperlipidemia      Mar 23 2015  2:57PM     

 

                    Anemia, Pernicious    Mar 23 2015  2:57PM     

 

                    Peritoneal Neoplasm, Malignant    Mar 23 2015  2:57PM     

 

                    B12 deficiency      May  4 2015  4:48PM     

 

                    Hyperlipidemia      May 13 2015  9:56AM     

 

                    Anemia              May 13 2015  9:56AM     

 

                    Bipolar disorder    May 13 2015  9:56AM     

 

                    Bipolar disorder    May 14 2015  3:27PM     

 

                    Hyperlipidemia      May 14 2015  3:27PM     

 

                    Anemia, Pernicious    May 14 2015  3:27PM     

 

                    Peritoneal Neoplasm, Malignant    May 14 2015  3:27PM     

 

                    B12 deficiency      2015  2:20PM     

 

                    B12 deficiency      2015 11:34AM     

 

                    B12 deficiency      Aug 18 2015  9:06AM     

 

                    Tinea Corporis      Sep 18 2015  8:54AM     

 

                    B12 deficiency      Sep 18 2015  8:54AM     

 

                    B12 deficiency      2015 10:28AM     

 

                    Herpes zoster without complication    Dec  3 2015  9:52AM     

 

                    B12 deficiency      Dec 23 2015 11:21AM     

 

                    B12 deficiency      2016  4:51PM     

 

                    Vitamin B 12 deficiency    Mar 14 2016  5:35PM     

 

                    B12 deficiency      Mar 15 2016 12:14PM     

 

                    B12 deficiency      May  5 2016 11:30AM     

 

                    Edema               May  5 2016 11:30AM     

 

                    Dermatitis          May  5 2016 11:30AM     

 

                    Edema               May 17 2016  8:38AM     

 

                    Shortness of breath    May 17 2016  8:38AM     

 

                    Bilateral edema of lower extremity    2016  2:06PM     

 

                    B12 deficiency      2016  2:06PM     

 

                    B12 deficiency      2016 11:50AM     

 

                    B12 deficiency      2016 11:20AM     

 

                    Diarrhea            Aug  2 2016  3:13PM     

 

                    B12 deficiency      Aug 24 2016 11:10AM     

 

                    Encounter for screening mammogram for breast cancer    Aug 24 2016 11:44AM     

 

                    B12 deficiency      Sep 28 2016  2:35PM     

 

                    B12 deficiency      Dec 15 2016  2:02PM     

 

                    Dysuria             Dec 29 2016 12:14PM     

 

                    Hematuria           Fidencio  3 2017  1:33PM     

 

                    Constipation by delayed colonic transit    2017  1:52PM     

 

                    Ileus               2017  1:52PM     

 

                    UTI (urinary tract infection)    Fidencio 15 2017  3:39PM     

 

                    Acute cystitis with hematuria    2017 11:07AM     

 

                    B12 deficiency      2017 11:07AM     

 

                    B12 deficiency      2017 11:40AM     

 

                    B12 deficiency      2017  4:07PM     

 

                    Slurred speech      2017  3:07PM     

 

                    Vitamin B12 deficiency    2017  3:07PM     

 

                    Dysphagia, unspecified type    2017  3:07PM     

 

                    Hyperlipidemia      2017  3:07PM     

 

                    Dysuria             2017 12:01PM     

 

                    B12 deficiency      2017  2:08PM     

 

                    Dysuria             2017 10:58AM     

 

                    Hematuria           May 22 2017  1:36PM     

 

                    Depressive Disorder    May 22 2017  1:36PM     

 

                    Constipation        May 22 2017  1:36PM     

 

                    Fatigue             May 22 2017  1:36PM     

 

                    Urinary tract infection    May 30 2017  9:36AM     

 

                    Hypokalemia         May 30 2017 12:03PM     

 

                    Urinary tract infection    2017  4:36PM     







Payers







           Insurance Name    Company Name    Plan Name    Plan Number    Policy Number    Policy Group

 Number                                 Start Date

 

                    Medicare RHC    Medicare RHC              [id number]              N/A

 

                          [employer]    [employer]                 [id number]

                                                    

 

                    Medicare Part A    Medicare - Lab/Xray              [id number]              2006

 

                    Medicare Part B    [employer] [place]              [id number]              2006

 

                          [employer]    [employer]                 50 percent

                                                    2009

 

                    Human    [employer]              [id number]              N/A

 

                    Medicare Part A    Medicare Part A              [id number]              N/A

 

                    Medicare Part A    Medicare Part A              [id number]              2006









History of Encounters







                    Visit Date          Visit Type          Provider

 

                    2017           Office visit        Bhupinder Louise DO

 

                    2017           Nurse visit         Bhupinder Louise DO

 

                    2017           Office visit         

 

                    2017           Office visit        Bhupinder Louise DO

 

                    2017           Nurse visit         Bhupinder Louise DO

 

                    2017           Office visit        Radha Ontiveros APRN

 

                    1/15/2017           Office visit        Aj Tapia NP

 

                    2017            Office visit        Devin Masterson MD

 

                    2016          [facility]            Devin Masterson MD

 

                    12/15/2016          Nurse visit         Bhupinder Louise DO

 

                    2016           Nurse visit         Bret GREEN

 

                    2016           Nurse visit         Bhupinder Louise DO

 

                    2016            Office visit        Bhupinder Louise DO

 

                    2016           Nurse visit         Bhupinder Louise DO

 

                    2016           Office visit        Bret GREEN

 

                    2016            Office visit        Bhupinder Louise DO

 

                    3/15/2016           Nurse visit         Bhupinder Louise DO

 

                    2016            Nurse visit         Bhupinder Aspen DO

 

                    2015          Nurse visit         Bhupinder Aspen DO

 

                    12/3/2015           Office visit        Bhupinder Aspen DO

 

                    2015          Nurse visit         Bhupinder Aspen DO

 

                    2015           Office visit        Bhupinder Aspen DO

 

                    2015           Nurse visit         Bhupinder Aspen DO

 

                    2015            Nurse visit         Bhupinder Aspen DO

 

                    2015            Nurse visit         Bhupinder Aspen DO

 

                    2015           Office visit        Bhupinder Aspen DO

 

                    2015            Nurse visit         Bhupinder Aspen DO

 

                    3/23/2015           Office visit        Bhupinder Aspen DO

 

                    10/16/2014          Office visit        Bhupinder Aspen DO

 

                    2014           Nurse visit         Radha Weston GREEN

 

                    7/10/2014           Nurse visit         Bhupinder Aspen DO

 

                    2014           Office visit        Bhupinder Aspen DO

 

                    2014           Nurse visit         Bhupinder Aspen DO

 

                    3/6/2014            Nurse visit         Bhupinder Aspen DO

 

                    2014            [facility]            EARNEST Lopez MD

 

                    2013          Nurse visit         Bhupinder Aspen DO

 

                    2013           Nurse visit         Bhupinder Aspen DO

 

                    2013            Office visit        Bhupinder Aspen DO

 

                    2013            Nurse visit         Bhupinder Aspen DO

 

                    2013            Nurse visit         Bhupinder Aspen DO

 

                    2013            Office visit        Bhupinder Aspen DO

 

                    4/3/2013            Nurse visit         Bhupinder Aspen DO

 

                    2013            Office visit        Bhupinder Aspen DO

 

                    10/16/2012          Nurse visit         Bhupinder Aspen DO

 

                    2012            Nurse visit         Bhupinder Aspen DO

 

                    2012            Voided              Bhupinder Aspen DO

 

                    2012            Nurse visit         Bhupinder Aspen DO

 

                    2012            Nurse visit         Bhupinder Aspen DO

 

                    2012           Nurse visit         Bhupinder Aspen DO

 

                    5/3/2012            Nurse visit         Bhupinder Aspen DO

 

                    2012           Nurse visit         Bhupinder Aspen DO

 

                    2012           Nurse visit         Bhupinder Aspen DO

 

                    2012           Nurse visit         Bhupinder Aspen DO

 

                    2011           Nurse visit         Bhupinder Aspen DO

 

                    10/20/2011          Nurse visit         Bhupinder Aspen DO

 

                    2011           Office visit        Bhupinder Louise DO

 

                    2011           Nurse visit         Radha GREEN

 

                    2011            Office visit        Bhupinder Louise DO

 

                    2011           Nurse visit         Bhupinder Louise DO

 

                    2011            Office visit        Bhupinder Aspen DO

 

                    2011           Office visit        Bhupinder Aspen DO

 

                    5/10/2011           Office visit        Bhupinder Louise DO

 

                    2011           Office visit        Bhupinder Louise DO

 

                    4/15/2011           Office visit        Devin Angel DO

 

                    2011           Office visit        Devin Angel DO

 

                    10/15/2010          Office visit        Devin Angel DO

 

                    2010           Office visit        Devin Angel DO

 

                    2010            Office visit        Devin Angel DO

 

                    2010           Office visit        Devin Angel DO

 

                    2010            Office visit        Devin Angel DO

 

                    3/8/2010            Office visit        Devin Masterson MD

 

                    2010            Surgery             Devin Masterson MD

 

                    2010            Office visit        Devin Angel DO

 

                    2010           Surgery             Devin Masterson MD

 

                    2010           Hospital            Devin Masterson MD

 

                    2010           [facility]            Devin Masterson MD

 

                    10/22/2009          Office visit        Devin Angel DO

## 2019-06-26 NOTE — XMS REPORT
MU2 Ambulatory Summary

                             Created on: 2016



Pauline Gan

External Reference #: [id number]

: 1950

Sex: Female



Demographics







                          Address                   [address]

[place]  [id number]

 

                          Home Phone                [phone]

 

                          Preferred Language        English

 

                          Marital Status            Legally 

 

                          Tenriism Affiliation     Unknown

 

                          Race                      White

 

                          Ethnic Group              Not  or 





Author







                          Bhupinder Aguilar

 

                          [facility]

 

                          Address                   [address]

[place]  [id number]



 

                          Phone                     [phone]







Care Team Providers







                    Care Team Member Name    Role                Phone

 

                    Bhupinder Louise    PCP                 Unavailable







Allergies and Adverse Reactions







                    Name                Reaction            Notes

 

                    NO KNOWN DRUG ALLERGIES                         







Plan of Treatment







             Planned Activity    Comments     Planned Date    Planned Time    Plan/Goal

 

             THER/PROPH/DIAG INJ SC/IM                 2016    12:00 AM      

 

             THER/PROPH/DIAG INJ SC/IM                 2016    12:00 AM      

 

             MAMMOGRAM BOTH BREASTS                 2016    12:00 AM      

 

             COMPLETE CBC W/AUTO DIFF WBC                 2013     12:00 AM      

 

             ASSAY OF LITHIUM                 2013     12:00 AM      

 

             METABOLIC PANEL TOTAL CA                 2013     12:00 AM      

 

             VITAMIN B-12                 2013     12:00 AM      

 

             ASSAY OF FOLIC ACID SERUM                 2013     12:00 AM      

 

             THER/PROPH/DIAG INJ SC/IM                 2013    12:00 AM      







Medications







                                        Active 

 

             Name         Start Date    Estimated Completion Date    SIG          Comments

 

                Latuda 20 mg oral tablet                                    take 1 tablet (20 mg) by oral route once daily with

 food (at least 350 calories)            

 

             pravastatin 40 mg oral tablet    3/30/2015                 TAKE 1 TABLET BY MOUTH DAILY     

 

                Namenda XR 28 mg oral capsule,sprinkle,ER 24hr    2015                       take 1 capsule (28

 mg) by oral route once daily            

 

                Namenda XR 28 mg oral capsule,sprinkle,ER 24hr    2016                       take 1 capsule (28

 mg) by oral route once daily            

 

                triamcinolone acetonide 0.1 % topical cream    2016                        apply a thin layer to 

the affected area(s) by topical route 2 times per day     

 

                furosemide 40 mg oral tablet    2016      take 1 tablet (40 mg) by oral

 route once daily                        

 

                potassium chloride 10 mEq oral tablet extended release    2016                       take 1 tablet

 (10 meq) by oral route once daily       

 

             pravastatin 40 mg oral tablet    2016                 TAKE 1 TABLET BY MOUTH DAILY     

 

                Vitamin B-12 1,000 mcg/mL injection solution    2016                       inject 1 milliliter 

(1,000 mcg) by intramuscular route once a month     









                                         

 

             Name         Start Date    Expiration Date    SIG          Comments

 

             Reglan 10mg    3/29/2010    2010    one ac and hs     

 

                Keflex 500 mg oral capsule    2010       10/1/2010       take 1 capsule (500 mg) by oral

 route every 6 hours for 10 days         

 

                Bactrim -160 mg oral tablet    2011       take 1 tablet by oral route

 every 12 hours for 7 days               

 

                triamcinolone acetonide 0.1 % topical cream    2011      apply a thin

 layer to the affected area(s) by topical route 2 times per day     

 

                sertraline 100 mg oral tablet    4/10/2012       5/10/2012       take 1.5 tablets by oral route

 daily for 30 days                       

 

                ergocalciferol (vitamin D2) 50,000 unit oral capsule    4/15/2013       2013       TAKE

 ONE CAPSULE BY MOUTH ONCE A WEEK        

 

                CYANOCOBALAM 1000MCGINJ 1000 milliliter    2013       INJECT 1ML INTRAMUSCULAR

 ONCE A MONTH                            

 

                pravastatin 40 mg oral tablet    3/25/2014       3/20/2015       TAKE ONE TABLET BY MOUTH EVERY

 DAY                                     

 

                          Zostavax (PF) 19,400 unit/0.65 mL subcutaneous suspension for reconstitution    3/23/2015

                    3/24/2015           inject 0.65 milliliter by subcutaneous route once     

 

                famciclovir 500 mg oral tablet    12/3/2015       12/10/2015      take 1 tablet (500 mg) by

 oral route every 8 hours for 7 days     

 

                Cipro 500 mg oral tablet    2016       take 1 tablet (500 mg) by oral route

 2 times per day for 5 days              









                                        Discontinued 

 

             Name         Start Date    Discontinued Date    SIG          Comments

 

                Tylenol 325 mg oral tablet                    2013        take 1 - 2 tablets (325 -650 mg) by oral

 route every 4-6 hours as needed         

 

                Calcium 600 + D(3) 600 mg(1,500mg) -400 unit oral tablet                    2011       take 1 tablet

 by oral route 2 times a day            no longer taking

 

                Vitamin B-12 1,000 mcg oral tablet extended release    2010       take 1

 tablet by oral route daily             no longer taking

 

                Antifungal (clotrimazole) 1 % topical cream    2010       apply to the 

affected and surrounding areas of skin by topical route 2 times per day morning 
and evening                              

 

                sertraline 100 mg oral tablet    5/10/2011       2011       take 2 tablets (200 mg) by 

oral route once daily                   discontinued by Dr. Serrano

 

                mirtazapine 15 mg oral tablet                    2011        take 1 tablet (15 mg) by oral route 

once daily before bedtime               Dr. Serrano

 

                mirtazapine 15 mg oral tablet                    2011        take 1 tablet (15 mg) by oral route 

once daily before bedtime               dc'd by Dr. Serrano

 

                Pristiq 50 mg oral tablet extended release 24 hr                    2013        take 1 tablet (50

 mg) by oral route once daily           Dr. Serrano

 

                Pristiq 50 mg oral tablet extended release 24 hr                    2013        take 1 tablet (50

 mg) by oral route once daily           dose updated

 

                Vitamin B-12 1,000 mcg/mL injection solution    2011        inject 1 milliliter

 (1,000 mcg) by intramuscular route once a month    on list already

 

                    syringe with needle 1 mL 25 gauge x 1" miscellaneous syringe    2011

                          use for injection once a month     

 

                clotrimazole 1 % topical cream    2011        apply to the affected and surrounding

 areas of skin by topical route 2 times per day in the morning and evening     

 

                Vitamin D2 50,000 unit oral capsule    2011        take 1 capsule (50,000

 unit) by oral route once weekly        generic on list

 

                Pravachol 40 mg oral tablet    2012        take 1 tablet (40 mg) by oral 

route once daily for 90 days            generic on list

 

                lithium carbonate 300 mg oral capsule    2012        take 1 capsule by oral

 route daily                            dose updated

 

                Pristiq 100 mg oral tablet extended release 24 hr                    4/10/2012       take 1 and 1/2 

tablet (150 mg) by oral route once daily    Mental Health provider

 

                Pristiq 100 mg oral tablet extended release 24 hr                    4/10/2012       take 1 and 1/2 

tablet (150 mg) by oral route once daily    Discontinued by [facility] Efrain [facility] at [facility]

 

                hydroxyzine HCl 50 mg oral tablet    10/16/2014      2015       take 1 tablet (50 mg) 

by oral route at bedtime                 

 

                lithium carbonate 300 mg oral capsule    2015       take 1 capsule (300

 mg) by oral route 2 for 30 days         

 

                fluconazole 100 mg oral tablet    2015       12/3/2015       take 1 tablet (100 mg) by 

oral route once a week                   

 

                ketoconazole 2 % topical cream    2015       12/3/2015       apply to the affected area(s)

 by topical route 2 times per day        

 

                prednisone 10 mg oral tablet    12/3/2015       2016        take 2 tablets (20 mg) by oral

 route once daily for 4 days 1 tablet daily for 4 days 0.5 tablet daily for 4 
days                                     







Problem List







                    Description         Status              Onset

 

                    Artificial opening status; colostomy    Active               

 

                    Bipolar disorder, unspecified    Active               

 

                    Hyperlipidemia      Active               

 

                    Peritoneal Neoplasm, Malignant    Active               

 

                    Anemia, Pernicious    Active               

 

                    Arthritis unspecified    Active               

 

                    B12 deficiency      Active               







Vital Signs







      Date    Time    BP-Sys(mm[Hg]    BP-Lynn(mm[Hg])    HR(bpm)    RR(rpm)    Temp    WT    HT    HC    BMI

                    BSA                 BMI Percentile      O2 Sat(%)

 

       2016    3:11:00 PM    134 mmHg    76 mmHg    80 bpm    20 rpm    98 F    163 lbs    69 in     

                24.07 kg/m2     1.90 m2                         98 %

 

        2016    2:04:00 PM    142 mmHg    86 mmHg    68 bpm    16 rpm    98.5 F    166 lbs    63 in

                          29.4053 kg/m    1.8295 m                 100 %

 

        2016    11:27:00 AM    148 mmHg    78 mmHg    90 bpm    20 rpm    98.2 F    153 lbs    69 in

                          22.59 kg/m2    1.84 m2                   96 %

 

        12/3/2015    9:50:00 AM    132 mmHg    70 mmHg    62 bpm    16 rpm    97.9 F    145 lbs    69 in

                          21.4125 kg/m    1.7894 m                 100 %

 

        2015    8:52:00 AM    132 mmHg    68 mmHg    52 bpm    20 rpm    97.8 F    141 lbs    69 in

                          20.82 kg/m2    1.76 m2                   100 %

 

        2015    3:25:00 PM    120 mmHg    62 mmHg    72 bpm    16 rpm    98.1 F    136 lbs    69 in

                          20.0835 kg/m    1.733 m                 98 %

 

       3/23/2015    2:55:00 PM    130 mmHg    76 mmHg    68 bpm    18 rpm    97 F    140 lbs    69 in    

                20.67 kg/m2     1.76 m2                         98 %

 

        10/16/2014    11:11:00 AM    120 mmHg    66 mmHg    77 bpm    20 rpm    98 F    130 lbs    69 in

                          19.1974 kg/m    1.6943 m                 100 %

 

        2014    3:21:00 PM    130 mmHg    66 mmHg    63 bpm    18 rpm    97.2 F    160 lbs    69 in

                          23.63 kg/m2    1.88 m2                   99 %

 

        2013    10:35:00 AM    132 mmHg    70 mmHg    66 bpm    20 rpm    98.1 F    157 lbs    69 in

                          23.1846 kg/m    1.862 m                  

 

        2013    1:29:00 PM    132 mmHg    70 mmHg    76 bpm    18 rpm    98.2 F    166 lbs    69 in 

                          24.51 kg/m2    1.91 m2                    

 

       2013    2:46:00 PM    128 mmHg    70 mmHg    76 bpm    16 rpm    98 F    160 lbs    69 in     

                23.6276 kg/m    1.8797 m                       

 

        2011    8:49:00 AM    128 mmHg    78 mmHg    70 bpm    18 rpm    97.9 F    164 lbs    69 in

                          24.22 kg/m2    1.90 m2                    

 

     2011    1:31:00 PM    132 mmHg    68 mmHg    84 bpm         97 F    167 lbs                        

                                         

 

        2011    9:09:00 AM    128 mmHg    70 mmHg    72 bpm    18 rpm    98.2 F    163 lbs    64 in 

                          27.98 kg/m2    1.83 m2                    

 

       2011    10:01:00 AM    132 mmHg    70 mmHg    72 bpm    18 rpm    98.2 F    154 lbs             

                                                                 

 

       2011    2:47:00 PM    128 mmHg    70 mmHg    72 bpm    18 rpm    97.8 F    156 lbs             

                                                                 

 

       5/10/2011    3:16:00 PM    144 mmHg    80 mmHg    72 bpm    18 rpm    98.2 F    158 lbs             

                                                                 

 

        2011    10:11:00 AM    132 mmHg    70 mmHg    70 bpm    18 rpm    98.2 F    168 lbs    69 in

                          24.81 kg/m2    1.93 m2                    

 

        4/15/2011    10:52:00 AM    110 mmHg    60 mmHg    75 bpm    16 rpm    97.5 F    172.375 lbs    

69 in                     25.4551 kg/m    1.951 m                 100 %

 

        2011    11:43:00 AM    120 mmHg    82 mmHg    75 bpm    16 rpm    97.2 F    178.5 lbs    69

 in                       26.36 kg/m2    1.99 m2                   100 %

 

        10/15/2010    1:32:00 PM    120 mmHg    70 mmHg    80 bpm    18 rpm    96.6 F    177 lbs    69 in

                          26.1381 kg/m    1.977 m                 100 %

 

        2010    3:50:00 PM    168 mmHg    100 mmHg    82 bpm    18 rpm    97.8 F    177.5 lbs    69

 in                       26.21 kg/m2    1.98 m2                   97 %

 

        2010    1:21:00 PM    140 mmHg    80 mmHg    59 bpm    16 rpm    97.6 F    173.25 lbs    69 

in                        25.5843 kg/m    1.956 m                 100 %

 

        2010    3:02:00 PM    140 mmHg    80 mmHg    61 bpm    16 rpm    97.6 F    173.125 lbs    69

 in                       25.57 kg/m2    1.96 m2                   99 %

 

        2010    1:23:00 PM    130 mmHg    80 mmHg    66 bpm    16 rpm    96.8 F    173 lbs    69 in 

                          25.5474 kg/m    1.9546 m                 100 %

 

        2010    12:58:00 PM    130 mmHg    88 mmHg    75 bpm    16 rpm    98.4 F    172.25 lbs    69

 in                       25.44 kg/m2    1.95 m2                   100 %







Social History







                    Name                Description         Comments

 

                    denies alcohol use                         

 

                    denies smoking                           

 

                    Denies illicit substance abuse                         

 

                    retired                                 direct care

 

                    Single                                   

 

                    Exercises regularly                         

 

                    Attended some college                         

 

                    Tobacco             Never smoker         







History of Procedures







                    Date Ordered        Description         Order Status

 

                    2010 12:00 AM    COMPREHEN METABOLIC PANEL    Reviewed

 

                    2010 12:00 AM    COMPLETE CBC W/AUTO DIFF WBC    Reviewed

 

                    2010 12:00 AM    LIPID PANEL         Reviewed

 

                          2015 12:00 AM        B12 Injection, Up to 1000 Mcg NDC#3810-9986-83 [facility] 

                                        Reviewed

 

                    2011 12:00 AM    MAMMOGRAM SCREENING    Reviewed

 

                    2011 12:00 AM    CYTOPATH C/V THIN LAYER    Reviewed

 

                    2011 12:00 AM    B12 Injection 1 cc NDC#[address]-81    Reviewed

 

                    2015 12:00 AM    THER/PROPH/DIAG INJ SC/IM    Reviewed

 

                    2015 12:00 AM    B12 Injection, Up to 1000 Mcg NDC#2757-1291-89    Reviewed

 

                    2011 12:00 AM    THER/PROPH/DIAG INJ SC/IM    Reviewed

 

                    2011 12:00 AM    B12 Injection([place]) Ndc#4238-2989-46-    Reviewed

 

                    2015 12:00 AM    THER/PROPH/DIAG INJ SC/IM    Reviewed

 

                    2015 12:00 AM    B12 Injection, Up to 1000 Mcg NDC#9625-8803-13    Reviewed

 

                    10/20/2011 12:00 AM    THER/PROPH/DIAG INJ SC/IM    Reviewed

 

                    10/20/2011 12:00 AM    B12 Injection([place]) Ndc#2772-4225-83-    Reviewed

 

                    2016 12:00 AM    THER/PROPH/DIAG INJ SC/IM    Reviewed

 

                    2016 12:00 AM    B12 Injection, Up to 1000 Mcg NDC#5785-1137-95    Reviewed

 

                    3/14/2016 12:00 AM    VITAMIN B-12        Reviewed

 

                    3/15/2016 12:00 AM    THER/PROPH/DIAG INJ SC/IM    Reviewed

 

                    3/15/2016 12:00 AM    B12 Injection, Up to 1000 Mcg NDC#9977-3428-20    Reviewed

 

                    2011 12:00 AM    ***Immunization administration, Medicare flu    Reviewed

 

                    2011 12:00 AM    Fluzone ** MEDICARE Only **    Reviewed

 

                    2011 12:00 AM    THER/PROPH/DIAG INJ SC/IM    Reviewed

 

                    2011 12:00 AM    B12 Injection ([facility]) Ndc#7201-3949-23    Reviewed

 

                    2016 12:00 AM    B12 Injection, Up to 1000 Mcg NDC#9155-2624-76 [facility] Medicare    

Reviewed

 

                    2016 12:00 AM    TTE W/DOPPLER COMPLETE    Reviewed

 

                    2016 12:00 AM    EXTREMITY STUDY     Returned

 

                          2016 12:00 AM        B12 Injection, Up to 1000 Mcg NDC#4889-2889-19 [facility] Medicare 

                                        Reviewed

 

                    2016 12:00 AM    THER/PROPH/DIAG INJ SC/IM    Reviewed

 

                    2016 12:00 AM    THER/PROPH/DIAG INJ SC/IM    Reviewed

 

                    2016 12:00 AM    B12 Injection, Up to 1000 Mcg NDC#2951-6776-15    Reviewed

 

                    2012 12:00 AM    THER/PROPH/DIAG INJ SC/IM    Reviewed

 

                    2012 12:00 AM    B12 Injection ([employer] [facility]) Ndc#9341-7297-66    Reviewed

 

                    2012 12:00 AM    THER/PROPH/DIAG INJ SC/IM    Reviewed

 

                    2012 12:00 AM    B12 Injection([place]) Ndc#6767-1154-90-    Reviewed

 

                    5/3/2012 12:00 AM    THER/PROPH/DIAG INJ SC/IM    Reviewed

 

                    5/3/2012 12:00 AM    B12 Injection([place]) Ndc#0344-1335-69-    Reviewed

 

                    2012 12:00 AM    IMMUNOTHERAPY INJECTIONS    Reviewed

 

                    2012 12:00 AM    B12 Injection([place]) Ndc#6329-3184-65-    Reviewed

 

                    2012 12:00 AM    THER/PROPH/DIAG INJ SC/IM    Reviewed

 

                    2012 12:00 AM    B12 Injection, Up to 1000 Mcg NDC#7142-1455-56    Reviewed

 

                    2012 12:00 AM    THER/PROPH/DIAG INJ SC/IM    Reviewed

 

                    2012 12:00 AM    B12 Injection, Up to 1000 Mcg NDC#0104-3872-89    Reviewed

 

                    2012 12:00 AM    THER/PROPH/DIAG INJ SC/IM    Reviewed

 

                    2012 12:00 AM    B12 Injection, Up to 1000 Mcg NDC#5236-0226-07    Reviewed

 

                    10/16/2012 12:00 AM    THER/PROPH/DIAG INJ SC/IM    Reviewed

 

                    10/16/2012 12:00 AM    B12 Injection, Up to 1000 Mcg NDC#7286-8599-02    Reviewed

 

                    2010 12:00 AM    COMPREHEN METABOLIC PANEL    Reviewed

 

                    2010 12:00 AM    COMPLETE CBC W/AUTO DIFF WBC    Reviewed

 

                    2010 12:00 AM    LIPID PANEL         Reviewed

 

                    2013 12:00 AM    Flu Injection 3 Years And Above NDC# [address]-57  [facility]    Reviewed



 

                    2013 12:00 AM    COMPLETE CBC W/AUTO DIFF WBC    Reviewed

 

                    2013 12:00 AM    ASSAY OF LITHIUM    Reviewed

 

                    2013 12:00 AM    METABOLIC PANEL TOTAL CA    Reviewed

 

                    4/3/2013 12:00 AM    THER/PROPH/DIAG INJ SC/IM    Reviewed

 

                    4/3/2013 12:00 AM    B12 Injection, Up to 1000 Mcg NDC#0099-8113-25    Reviewed

 

                    2013 12:00 AM    THER/PROPH/DIAG INJ SC/IM    Reviewed

 

                    2013 12:00 AM    B12 Injection, Up to 1000 Mcg NDC#9002-9026-00    Reviewed

 

                    2013 12:00 AM    THER/PROPH/DIAG INJ SC/IM    Reviewed

 

                    2013 12:00 AM    B12 Injection, Up to 1000 Mcg NDC#8851-0582-93    Reviewed

 

                    2013 12:00 AM    LIPID PANEL         Reviewed

 

                    2013 12:00 AM    VITAMIN D 25 HYDROXY    Reviewed

 

                    2013 12:00 AM    THER/PROPH/DIAG INJ SC/IM    Reviewed

 

                    3/6/2014 12:00 AM    THER/PROPH/DIAG INJ SC/IM    Reviewed

 

                    2014 12:00 AM    THER/PROPH/DIAG INJ SC/IM    Reviewed

 

                    2014 12:00 AM    B12 Injection, Up to 1000 Mcg NDC#7030-1627-17    Reviewed

 

                    2010 12:00 AM    SKIN FUNGI CULTURE    Reviewed

 

                    10/9/2010 12:00 AM    COMPREHEN METABOLIC PANEL    Reviewed

 

                    10/9/2010 12:00 AM    LIPID PANEL         Reviewed

 

                    2010 12:00 AM    THER/PROPH/DIAG INJ SC/IM    Reviewed

 

                    2010 12:00 AM    B12 Injection Ndc#[address]-61 ([place])    Reviewed

 

                    2010 12:00 AM    THER/PROPH/DIAG INJ SC/IM    Reviewed

 

                    2010 12:00 AM    Kenalog 40 Mg Im-Ndc#[address]-70 ([place])    Reviewed

 

                    10/15/2010 12:00 AM    FLU VACCINE 3 YRS & > IM    Reviewed

 

                    10/15/2010 12:00 AM    Admin.Of M/C Cov.Vaccine-Flu Vacc.    Reviewed

 

                    1/15/2011 12:00 AM    COMPLETE CBC W/AUTO DIFF WBC    Reviewed

 

                    1/15/2011 12:00 AM    COMPREHEN METABOLIC PANEL    Reviewed

 

                    1/15/2011 12:00 AM    LIPID PANEL         Reviewed

 

                    2014 12:00 AM    MAMMOGRAM SCREENING    Reviewed

 

                    2014 12:00 AM    Screening mammography, bilateral    Reviewed

 

                    7/10/2014 12:00 AM    THER/PROPH/DIAG INJ SC/IM    Reviewed

 

                    7/10/2014 12:00 AM    B12 Injection, Up to 1000 Mcg NDC#9416-4112-45    Reviewed

 

                    2011 12:00 AM    COMPLETE CBC W/AUTO DIFF WBC    Reviewed

 

                    2011 12:00 AM    COMPREHEN METABOLIC PANEL    Reviewed

 

                    2011 12:00 AM    LIPID PANEL         Reviewed

 

                    2014 12:00 AM    B12 Injection, Up to 1000 Mcg NDC#5775-7301-01    Reviewed

 

                    10/19/2014 12:00 AM    MAMMOGRAM SCREENING    Reviewed

 

                    10/19/2014 12:00 AM    Screening mammography, bilateral    Reviewed

 

                    10/16/2014 12:00 AM    COMPLETE CBC W/AUTO DIFF WBC    Reviewed

 

                    10/16/2014 12:00 AM    COMPREHEN METABOLIC PANEL    Reviewed

 

                    10/16/2014 12:00 AM    IMMUNOASSAY TUMOR     Reviewed

 

                    10/16/2014 12:00 AM    LIPID PANEL         Reviewed

 

                    10/16/2014 12:00 AM    ASSAY OF LITHIUM    Reviewed

 

                    10/16/2014 12:00 AM    MAMMOGRAM SCREENING    Reviewed

 

                    2011 12:00 AM    ASSAY OF PARATHORMONE    Reviewed

 

                    2011 12:00 AM    VITAMIN D 25 HYDROXY    Reviewed

 

                    2011 12:00 AM    ASSAY OF LITHIUM    Reviewed

 

                    2011 12:00 AM    METABOLIC PANEL TOTAL CA    Reviewed

 

                    2011 12:00 AM    CT HEAD/BRAIN W/O & W/DYE    Reviewed

 

                    3/23/2015 12:00 AM    PNEUMOCOCCAL VACC 13 GLENDY IM    Reviewed

 

                    3/23/2015 12:00 AM    Vitamin B12 injection    Reviewed

 

                    2011 12:00 AM    ASSAY OF LITHIUM    Reviewed

 

                    2011 12:00 AM    B12 Injection Ndc#[address]-00  [facility]    Reviewed

 

                    2015 12:00 AM    THER/PROPH/DIAG INJ SC/IM    Reviewed

 

                    2015 12:00 AM    B12 Injection, Up to 1000 Mcg NDC#8379-3074-49    Reviewed

 

                    2015 12:00 AM    COMPLETE CBC W/AUTO DIFF WBC    Reviewed

 

                    2015 12:00 AM    COMPREHEN METABOLIC PANEL    Reviewed

 

                    2015 12:00 AM    LIPID PANEL         Reviewed

 

                    2015 12:00 AM    ASSAY OF LITHIUM    Reviewed

 

                    2011 12:00 AM    VIT D 1 25-DIHYDROXY    Reviewed

 

                    2011 12:00 AM    VITAMIN B-12        Reviewed

 

                    2015 12:00 AM    B12 Injection, Up to 1000 Mcg NDC#1245-3134-43    Reviewed

 

                    2015 12:00 AM    THER/PROPH/DIAG INJ SC/IM    Reviewed

 

                    2015 12:00 AM    B12 Injection, Up to 1000 Mcg NDC#2897-4791-92    Reviewed

 

                    2011 12:00 AM    THER/PROPH/DIAG INJ SC/IM    Reviewed

 

                    2011 12:00 AM    B12 Injection ([facility]) Ndc#7830-3227-28    Reviewed

 

                    2015 12:00 AM    THER/PROPH/DIAG INJ SC/IM    Reviewed

 

                    2015 12:00 AM    B12 Injection, Up to 1000 Mcg NDC#2454-6171-35    Reviewed







Results Summary







                          Data and Description      Results

 

                          2004 12:00 AM        Colonoscopy-Women and Men over 50 Normal 

 

                          2008 12:00 AM         Pap Smear Declined 

 

                          10/7/2009 12:00 AM        Cholest Cry Stone Ql .0 %LDLc SerPl-mCnc 123.0 mg/dLHDLc

 SerPl-mCnc 34.0 mg/dLTrigl SerPl-mCnc 190.0 mg/dLGlucose SerPl-mCnc 78.0 mg/dL

 

                          2009 12:00 AM        Mammogram -Women over 40 Normal HIV1+2 Ab Ser Ql no risk 

 

                          2010 8:47 AM         Dexa Bone Scan Refused Aspirin reccommended Contraindication 



 

                          2010 8:48 AM         Depression Done 

 

                          2010 12:00 AM         Foot Exam-Diabetic Done 

 

                          2010 12:00 AM         Cholest Cry Stone Ql .0 %LDLc SerPl-mCnc 126.0 mg/dLGlucose

 SerPl-mCnc 102.0 mg/dL

 

                          2010 8:45 AM          TRIGLYCERIDES 122.0 mg/dLCHOLESTEROL 186.0 mg/dLHDL 36.0 mg/dLLDL

 (CALC) 126.0 mg/dLGLUCOSE 102.0 mg/dLSODIUM 143.0 mmol/LPOTASSIUM 3.70 
mmol/LCHLORIDE 111.0 mmol/LCO2 23.0 mmol/LBUN 10.0 mg/dLCREATININE 0.80 
mg/dLSGOT/AST 12.0 IU/LSGPT/ALT 11.0 IU/LALK PHOS 65.0 IU/LTOTAL PROTEIN 7.20 
g/dLALBUMIN 3.90 g/dLTOTAL BILI 0.50 mg/dLCALCIUM 10.20 mg/dLeGFR >60 
mL/min/1.73 m2WBC 5.7 RBC 3.26 HGB 10.60 g/dLHCT 31.70 %MCV 97.0 fLMCH 32.50 
pgMCHC 33.40 g/dLRDW CV 13.30 %MPV 9.70 fLPLT 287 %NEUT 62.90 %%LYMP 21.80 
%%MONO 9.90 %%EOS 5.0 %%BASO 0.40 %#NEUT 3.56 #LYMP 1.23 #MONO 0.56 #EOS 0.28 
#BASO 0.02 

 

                          2010 12:00 AM        Glucose SerPl-mCnc 96.0 mg/dLCholest Cry Stone Ql .0 %LDLc

 SerPl-mCnc 146.0 mg/dL

 

                          2010 8:26 AM         TRIGLYCERIDES 106.0 mg/dLCHOLESTEROL 199.0 mg/dLHDL 32.0 mg/dLLDL

 (CALC) 146.0 mg/dLGLUCOSE 96.0 mg/dLSODIUM 143.0 mmol/LPOTASSIUM 4.0 
mmol/LCHLORIDE 113.0 mmol/LCO2 24.0 mmol/LBUN 13.0 mg/dLCREATININE 1.0 
mg/dLSGOT/AST 11.0 IU/LSGPT/ALT 6.0 IU/LALK PHOS 56.0 IU/LTOTAL PROTEIN 6.60 
g/dLALBUMIN 3.80 g/dLTOTAL BILI 0.50 mg/dLCALCIUM 9.30 mg/dLeGFR 57 

 

                          10/6/2010 12:00 AM        Cholest Cry Stone Ql .0 %LDLc SerPl-mCnc 111.0 mg/dLGlucose

 SerPl-mCnc 81.0 mg/dL

 

                          10/6/2010 2:45 PM         TRIGLYCERIDES 123.0 mg/dLCHOLESTEROL 178.0 mg/dLHDL 42.0 mg/dLLDL

 (CALC) 111.0 mg/dLGLUCOSE 81.0 mg/dLSODIUM 139.0 mmol/LPOTASSIUM 4.10 
mmol/LCHLORIDE 106.0 mmol/LCO2 24.0 mmol/LBUN 13.0 mg/dLCREATININE 0.90 
mg/dLSGOT/AST 13.0 IU/LSGPT/ALT 11.0 IU/LALK PHOS 61.0 IU/LTOTAL PROTEIN 7.10 
g/dLALBUMIN 3.90 g/dLTOTAL BILI 0.30 mg/dLCALCIUM 9.30 mg/dLeGFR >60 mL/min/1.73
 m2WBC 6.9 RBC 3.59 HGB 11.50 g/dLHCT 35.30 %MCV 98.0 fLMCH 32.0 pgMCHC 32.60 
g/dLRDW CV 12.90 %MPV 9.90 fLPLT 311 %NEUT 64.90 %%LYMP 22.50 %%MONO 7.20 %%EOS 
5.10 %%BASO 0.30 %#NEUT 4.45 #LYMP 1.54 #MONO 0.49 #EOS 0.35 #BASO 0.02 

 

                          2011 12:00 AM         Mammogram -Women over 40 Ordered 

 

                          2011 10:25 AM        TRIGLYCERIDES 111.0 mg/dLCHOLESTEROL 195.0 mg/dLHDL 43.0 mg/dLLDL

 (CALC) 130.0 mg/dLWBC 5.3 RBC 3.76 HGB 12.0 g/dLHCT 37.80 %.0 fLMCH 
31.90 pgMCHC 31.70 g/dLRDW CV 13.0 %MPV 9.70 fLPLT 259 %NEUT 69.0 %%LYMP 17.60 
%%MONO 8.30 %%EOS 4.70 %%BASO 0.40 %#NEUT 3.63 #LYMP 0.93 #MONO 0.44 #EOS 0.25 
#BASO 0.02 GLUCOSE 102.0 mg/dLSODIUM 146.0 mmol/LPOTASSIUM 4.20 mmol/LCHLORIDE 
113.0 mmol/LCO2 23.0 mmol/LBUN 15.0 mg/dLCREATININE 1.0 mg/dLSGOT/AST 12.0 
IU/LSGPT/ALT 17.0 IU/LALK PHOS 60.0 IU/LTOTAL PROTEIN 6.90 g/dLALBUMIN 4.20 
g/dLTOTAL BILI 0.40 mg/dLCALCIUM 9.70 mg/dLeGFR 57 

 

                          2011 11:49 AM        Cholest Cry Stone Ql .0 %LDLc SerPl-mCnc 130.0 mg/dLHDLc

 SerPl-mCnc 43.0 mg/dLTrigl SerPl-mCnc 111.0 mg/dLGlucose SerPl-mCnc 102.0 mg/dL

 

                          2011 11:52 AM        Pap Smear Declined 

 

                          2011 11:28 AM        Lithium 2.080 mmol/LGLUCOSE 102.0 mg/dLSODIUM 135.0 mmol/LPOTASSIUM

 3.90 mmol/LCHLORIDE 106.0 mmol/LCO2 21.0 mmol/LBUN 12.0 mg/dLCREATININE 1.30 
mg/dLCALCIUM 10.70 mg/dLeGFR 42 

 

                          2011 8:58 AM          Lithium 0.690 mmol/L

 

                          2011 2:38 PM         VITAMIN B12 3483.0 pg/mL

 

                          2013 3:35 PM          WBC 5.1 RBC 3.73 HGB 11.70 g/dLHCT 36.40 %MCV 98.0 fLMCH 31.40

 pgMCHC 32.10 g/dLRDW CV 13.10 %MPV 9.80 fLPLT 224 %NEUT 66.80 %%LYMP 19.10 
%%MONO 9.0 %%EOS 4.90 %%BASO 0.20 %#NEUT 3.42 #LYMP 0.98 #MONO 0.46 #EOS 0.25 
#BASO 0.01 GLUCOSE 88.0 mg/dLSODIUM 141.0 mmol/LPOTASSIUM 4.10 mmol/LCHLORIDE 
110.0 mmol/LCO2 22.0 mmol/LBUN 22.0 mg/dLCREATININE 1.10 mg/dLCALCIUM 9.80 
mg/dLeGFR 50 Lithium 0.760 mmol/L

 

                          2013 11:02 AM        TRIGLYCERIDES 106.0 mg/dLCHOLESTEROL 181.0 mg/dLHDL 46.0 mg/dLLDL

 (CALC) 114.0 mg/dLVITAMIN D 41.10 ng/mL

 

                          10/17/2014 10:10 AM       WBC 5.0 RBC 3.66 HGB 11.60 g/dLHCT 36.80 %.0 fLMCH 31.70

 pgMCHC 31.50 g/dLRDW CV 13.50 %MPV 10.10 fLPLT 209 %NEUT 69.20 %%LYMP 21.0 
%%MONO 6.40 %%EOS 3.20 %%BASO 0.20 %#NEUT 3.46 #LYMP 1.05 #MONO 0.32 #EOS 0.16 
#BASO 0.01 GLUCOSE 100.0 mg/dLSODIUM 148.0 mmol/LPOTASSIUM 3.90 mmol/LCHLORIDE 
114.0 mmol/LCO2 26.0 mmol/LBUN 12.0 mg/dLCREATININE 1.20 mg/dLSGOT/AST 9.0 
IU/LSGPT/ALT <6 IU/LALK PHOS 82.0 IU/LTOTAL PROTEIN 6.90 g/dLALBUMIN 4.0 
g/dLTOTAL BILI 0.40 mg/dLCALCIUM 10.50 mg/dLeGFR 45 TRIGLYCERIDES 96.0 
mg/dLCHOLESTEROL 155.0 mg/dLHDL 38.0 mg/dLLDL (CALC) 98.0 mg/dLLithium 0.850 
mmol/LCancer Antigen (CA) 125 8.30 U/mL

 

                          2015 10:25 AM        Lithium 0.790 mmol/LWBC 4.8 RBC 3.44 HGB 11.0 g/dLHCT 35.20 

%.0 fLMCH 32.0 pgMCHC 31.30 g/dLRDW CV 14.0 %MPV 9.30 fLPLT 210 %NEUT 
70.80 %%LYMP 17.20 %%MONO 8.10 %%EOS 3.50 %%BASO 0.40 %#NEUT 3.41 #LYMP 0.83 
#MONO 0.39 #EOS 0.17 #BASO 0.02 TRIGLYCERIDES 107.0 mg/dLCHOLESTEROL 174.0 
mg/dLHDL 43.0 mg/dLLDL (CALC) 110.0 mg/dLGLUCOSE 90.0 mg/dLSODIUM 145.0 
mmol/LPOTASSIUM 3.80 mmol/LCHLORIDE 115.0 mmol/LCO2 24.0 mmol/LBUN 17.0 mg
/dLCREATININE 1.30 mg/dLSGOT/AST 18.0 IU/LSGPT/ALT 17.0 IU/LALK PHOS 56.0 
IU/LTOTAL PROTEIN 6.70 g/dLALBUMIN 3.90 g/dLTOTAL BILI 0.40 mg/dLCALCIUM 9.80 
mg/dLeGFR 41 

 

                          2015 8:50 AM        WBC 5.8 RBC 3.29 HGB 10.70 g/dLHCT 34.0 %.0 fLMCH 32.50

 pgMCHC 31.50 g/dLRDW CV 13.60 %MPV 9.60 fLPLT 223 %NEUT 69.60 %%LYMP 18.90 
%%MONO 8.50 %%EOS 2.80 %%BASO 0.20 %#NEUT 4.03 #LYMP 1.09 #MONO 0.49 #EOS 0.16 
#BASO 0.01 Lithium 0.620 mmol/LGLUCOSE 83.0 mg/dLSODIUM 139.0 mmol/LPOTASSIUM 
3.90 mmol/LCHLORIDE 109.0 mmol/LCO2 22.0 mmol/LBUN 19.0 mg/dLCREATININE 1.40 
mg/dLSGOT/AST 19.0 IU/LSGPT/ALT 21.0 IU/LALK PHOS 55.0 IU/LTOTAL PROTEIN 6.50 
g/dLALBUMIN 3.90 g/dLTOTAL BILI 0.50 mg/dLCALCIUM 9.60 mg/dLeGFR 38 
TRIGLYCERIDES 121.0 mg/dLCHOLESTEROL 192.0 mg/dLHDL 51.0 mg/dLLDL 121.0 mg/dLTSH
 1.210 uIU/mLHemoglobin A1c 5.40 %

 

                          3/15/2016 8:08 AM         VITAMIN B12 696.0 pg/mL

 

                          3/23/2016 8:26 AM         WBC 7.0 RBC 3.61 HGB 11.80 g/dLHCT 37.70 %.0 fLMCH 32.70

 pgMCHC 31.30 g/dLRDW CV 12.50 %MPV 10.0 fLPLT 207 %NEUT 73.60 %%LYMP 16.40 
%%MONO 6.60 %%EOS 3.0 %%BASO 0.30 %#NEUT 5.15 #LYMP 1.15 #MONO 0.46 #EOS 0.21 
#BASO 0.02 Lithium 0.940 mmol/LGLUCOSE 108.0 mg/dLSODIUM 143.0 mmol/LPOTASSIUM 
4.30 mmol/LCHLORIDE 110.0 mmol/LCO2 27.0 mmol/LBUN 16.0 mg/dLCREATININE 1.60 
mg/dLSGOT/AST 13.0 IU/LSGPT/ALT 7.0 IU/LALK PHOS 71.0 IU/LTOTAL PROTEIN 6.80 
g/dLALBUMIN 4.0 g/dLTOTAL BILI 0.20 mg/dLCALCIUM 10.40 mg/dLeGFR 32 
TRIGLYCERIDES 113.0 mg/dLCHOLESTEROL 169.0 mg/dLHDL 42.0 mg/dLLDL (CALC) 104.0 
mg/dLTSH 2.20 uIU/mLHemoglobin A1c 5.20 %

 

                          3/25/2016 9:17 AM         COLOR YELLOW APPEARANCE CLEAR SPEC GRAV 1.010 pH 7.0 PROTEIN 

NEGATIVE GLUCOSE NEGATIVE mg/dLKETONE NEGATIVE BILIRUBIN NEGATIVE BLOOD NEGATIVE
 NITRITE NEGATIVE LEUK SCREEN SMALL CASTS/LPF NEGATIVE /LPFCRYSTALS NEGATIVE 
MUCOUS THRDS NEGATIVE BACTERIA NEGATIVE EPITH CELLS FEW SQUAMOUS /HPFTRICHOMONAS
 NEGATIVE YEAST NEGATIVE 







History Of Immunizations







       Name    Date Admin    Mfg Name    Mfg Code    Trade Name    Lot#    Route    Inj    Vis Given    Vis

 Pub                                    CVX

 

        Influenza    2008    Not Entered    NE      Not Entered            Not Entered    Not Entered

                    1            999

 

        X       12/19/2008    [employer]    MSD     Pneumovax 23            Intramuscular    Not Entered

                    1            999

 

           Influenza    10/15/2010    [employer]    NOV        Fluvirin > 12 Years    

[id number]     Intramuscular    Left Deltoid    10/15/2010    2009    999

 

          X         3/23/2015    [facility]    [facility]       Prevnar 13    [id number]    Intramuscular

                Right Gluteous Medius    3/23/2015       2013       109







History of Past Illness







                    Name                Date of Onset       Comments

 

                    Peritoneal Neoplasm, Malignant                         

 

                    Hyperlipidemia                           

 

                    Bipolar disorder, unspecified                         

 

                    Artificial opening status; colostomy                         

 

                    B12 deficiency                           

 

                    Anemia, Pernicious                         

 

                    Arthritis unspecified                         

 

                    cervical cancer                          

 

                    Artificial opening status; colostomy    2010  1:10PM     

 

                    Bipolar disorder, unspecified    2010  1:10PM     

 

                    Hyperlipidemia      2010  1:10PM     

 

                    Anemia, Pernicious    2010  1:10PM     

 

                    Postoperative Follow-Up    2010  1:55PM     

 

                    Postoperative Follow-Up    Mar  8 2010 10:57AM     

 

                    Artificial opening status; colostomy    Mar  8 2010  1:19PM     

 

                    Peritoneal Neoplasm, Malignant    Mar  8 2010  1:19PM     

 

                    Artificial opening status; colostomy    2010  1:40PM     

 

                    Hyperlipidemia      2010  1:40PM     

 

                    Anemia, Pernicious    2010  1:40PM     

 

                    Peritoneal Neoplasm, Malignant    2010  1:40PM     

 

                    Arthritis unspecified    2010  1:40PM     

 

                    Anemia of Chronic Illness    2010  1:40PM     

 

                    Tinea corporis      2010  3:17PM     

 

                    Bipolar disorder, unspecified    2010  1:33PM     

 

                    Hyperlipidemia      2010  1:33PM     

 

                    Anemia, Pernicious    2010  1:33PM     

 

                    Peritoneal Neoplasm, Malignant    2010  1:33PM     

 

                    B12 deficiency      2010  1:33PM     

 

                    Ethmoidal Sinusitis, Acute    Sep 21 2010  3:53PM     

 

                    Wheezing            Sep 21 2010  3:53PM     

 

                    Flu                 Oct 15 2010  1:40PM     

 

                    Bipolar disorder, unspecified    Oct 15 2010  1:42PM     

 

                    Hyperlipidemia      Oct 15 2010  1:42PM     

 

                    Anemia, Pernicious    Oct 15 2010  1:42PM     

 

                    Peritoneal Neoplasm, Malignant    Oct 15 2010  1:42PM     

 

                    Bipolar disorder, unspecified    2011 12:01PM     

 

                    Hyperlipidemia      2011 12:01PM     

 

                    Anemia, Pernicious    2011 12:01PM     

 

                    Peritoneal Neoplasm, Malignant    2011 12:01PM     

 

                    Bipolar disorder, unspecified    Apr 15 2011 10:55AM     

 

                    Major Depression    2011 10:11AM     

 

                    Bipolar Disorder    2011 10:11AM     

 

                    Cancer              May 10 2011  4:16PM     

 

                    Major Depression    May 10 2011  3:16PM     

 

                    Bipolar Disorder    May 10 2011  3:16PM     

 

                    Hypercalcemia       May 23 2011  2:47PM     

 

                    Bipolar disorder, unspecified    May 23 2011  2:47PM     

 

                    Colon Cancer, Personal History    May 23 2011  2:47PM     

 

                    Bipolar Disorder    May 31 2011  4:39PM     

 

                    Depressive Disorder    2011 10:01AM     

 

                    Vitamin B12 deficiency    2011 10:01AM     

 

                    Vitamin D Deficiency    2011  5:07PM     

 

                    Anemia, Vitamin B12 Deficiency    2011  5:07PM     

 

                    B12 deficiency      2011  3:56PM     

 

                    Routine gynecological examination    Aug  4 2011  9:08AM     

 

                    Screening Examination for Breast Cancer    Aug  4 2011  9:08AM     

 

                    Tinea Corporis      Aug  4 2011  9:08AM     

 

                    Depressive Disorder    Sep 23 2011  8:47AM     

 

                    Contact Dermatitis    Sep 23 2011  8:47AM     

 

                    Anemia, Pernicious    Sep 23 2011  8:47AM     

 

                    B12 deficiency      Sep 23 2011  8:47AM     

 

                    B12 deficiency      Sep 27 2011  2:58PM     

 

                    B12 deficiency      Oct 20 2011  2:34PM     

 

                    Flu                 Dec  9 2011  3:16PM     

 

                    B12 deficiency      Dec  9 2011  3:17PM     

 

                    B12 deficiency      2012  4:52PM     

 

                    B12 deficiency      2012 11:10AM     

 

                    B12 deficiency      2012  3:37PM     

 

                    B12 deficiency      May  3 2012  4:10PM     

 

                    B12 deficiency      2012  2:54PM     

 

                    B12 deficiency      2012 11:23AM     

 

                    B12 deficiency      Aug  9 2012  2:08PM     

 

                    B12 deficiency      Sep  6 2012  4:36PM     

 

                    B12 deficiency      Oct 16 2012 10:23AM     

 

                    Flu                 b  2013  3:11PM     

 

                    Bipolar disorder, unspecified    b  2013  2:48PM     

 

                    Anemia, Pernicious    2013  2:48PM     

 

                    B12 deficiency      Feb  2013  2:48PM     

 

                    Extrapyramidal abnormal movement disorder    Feb  2013  2:48PM     

 

                    B12 deficiency      Apr  3 2013 12:03PM     

 

                    Bipolar disorder, unspecified    May  7 2013  1:31PM     

 

                    Anemia, Pernicious    May  7 2013  1:31PM     

 

                    B12 deficiency      May  7 2013  1:31PM     

 

                    Extrapyramidal abnormal movement disorder    May  7 2013  1:31PM     

 

                    B12 deficiency      2013  3:42PM     

 

                    B12 deficiency      2013  1:31PM     

 

                    Hyperlipidemia      Aug  7 2013 10:37AM     

 

                    Vitamin D Deficiency    Aug  7 2013 10:37AM     

 

                    Bipolar disorder, unspecified    Aug  7 2013 10:37AM     

 

                    Anemia, Pernicious    Aug  7 2013 10:37AM     

 

                    B12 deficiency      Aug  7 2013 10:37AM     

 

                    B12 deficiency      Sep 25 2013 11:15AM     

 

                    B12 deficiency      Dec 11 2013  3:16PM     

 

                    B12 deficiency      Mar  6 2014  1:48PM     

 

                    B12 deficiency      May 21 2014  3:17PM     

 

                    Screening Examination for Breast Cancer    2014  3:23PM     

 

                    Periumbilical abdominal pain    2014  3:23PM     

 

                    B12 deficiency      Jul 10 2014  2:52PM     

 

                    Anemia, Vitamin B12 Deficiency    Aug 13 2014  4:50PM     

 

                    Bipolar disorder    Oct 16 2014 11:13AM     

 

                    Hyperlipidemia      Oct 16 2014 11:13AM     

 

                    Anemia, Pernicious    Oct 16 2014 11:13AM     

 

                    Peritoneal Neoplasm, Malignant    Oct 16 2014 11:13AM     

 

                    Screening breast examination    Oct 16 2014 11:13AM     

 

                    Weight loss         Oct 16 2014 11:13AM     

 

                    Anemia, Pernicious    Mar 23 2015  2:57PM     

 

                    B12 deficiency      Mar 23 2015  2:57PM     

 

                    Need for Prevnar vaccine    Mar 23 2015  2:57PM     

 

                    Bipolar disorder    Mar 23 2015  2:57PM     

 

                    Hyperlipidemia      Mar 23 2015  2:57PM     

 

                    Anemia, Pernicious    Mar 23 2015  2:57PM     

 

                    Peritoneal Neoplasm, Malignant    Mar 23 2015  2:57PM     

 

                    B12 deficiency      May  4 2015  4:48PM     

 

                    Hyperlipidemia      May 13 2015  9:56AM     

 

                    Anemia              May 13 2015  9:56AM     

 

                    Bipolar disorder    May 13 2015  9:56AM     

 

                    Bipolar disorder    May 14 2015  3:27PM     

 

                    Hyperlipidemia      May 14 2015  3:27PM     

 

                    Anemia, Pernicious    May 14 2015  3:27PM     

 

                    Peritoneal Neoplasm, Malignant    May 14 2015  3:27PM     

 

                    B12 deficiency      2015  2:20PM     

 

                    B12 deficiency      2015 11:34AM     

 

                    B12 deficiency      Aug 18 2015  9:06AM     

 

                    Tinea Corporis      Sep 18 2015  8:54AM     

 

                    B12 deficiency      Sep 18 2015  8:54AM     

 

                    B12 deficiency      2015 10:28AM     

 

                    Herpes zoster without complication    Dec  3 2015  9:52AM     

 

                    B12 deficiency      Dec 23 2015 11:21AM     

 

                    B12 deficiency      2016  4:51PM     

 

                    Vitamin B 12 deficiency    Mar 14 2016  5:35PM     

 

                    B12 deficiency      Mar 15 2016 12:14PM     

 

                    B12 deficiency      May  5 2016 11:30AM     

 

                    Edema               May  5 2016 11:30AM     

 

                    Dermatitis          May  5 2016 11:30AM     

 

                    Edema               May 17 2016  8:38AM     

 

                    Shortness of breath    May 17 2016  8:38AM     

 

                    Bilateral edema of lower extremity    2016  2:06PM     

 

                    B12 deficiency      2016  2:06PM     

 

                    B12 deficiency      2016 11:50AM     

 

                    B12 deficiency      2016 11:20AM     

 

                    Diarrhea            Aug  2 2016  3:13PM     

 

                    B12 deficiency      Aug 24 2016 11:10AM     

 

                    Encounter for screening mammogram for breast cancer    Aug 24 2016 11:44AM     







Payers







           Insurance Name    Company Name    Plan Name    Plan Number    Policy Number    Policy Group

 Number                                 Start Date

 

                    Medicare Part A    Medicare RHC              [id number]              N/A

 

                          [employer]    [employer]                 [id number]

                                                    

 

                    Medicare Part A    Medicare - Lab/Xray              [id number]              2006

 

                    Medicare Part B    [place]ic[employer] [place]              [id number]              2006

 

                          [employer]    [facility] [facility] [employer] [facility]                 50 percent

                                                    2009

 

                    [facility]    [employer]              [id number]              N/A

 

                    Medicare Part A    Medicare Part A              [id number]              N/A

 

                    Medicare Part A    Medicare Part A              [id number]              2006









History of Encounters







                    Visit Date          Visit Type          Provider

 

                    2016           Nurse visit         Bhupinder Louise DO

 

                    2016            Office visit        Bhupinder Louise DO

 

                    2016           Nurse visit         Bhupinder Louise DO

 

                    2016           Office visit        Bret GREEN

 

                    2016            Office visit        Bhupinder Aspen DO

 

                    3/15/2016           Nurse visit         Bhupinder Aspen DO

 

                    2016            Nurse visit         Bhupinder Aspen DO

 

                    2015          Nurse visit         Bhupinder Aspen DO

 

                    12/3/2015           Office visit        Bhupinder Aspen DO

 

                    2015          Nurse visit         Bhupinder Aspen DO

 

                    2015           Office visit        Bhupinder Aspen DO

 

                    2015           Nurse visit         Bhupinder Aspen DO

 

                    2015            Nurse visit         Bhupinder Aspen DO

 

                    2015            Nurse visit         Bhupinder Aspen DO

 

                    2015           Office visit        Bhupinder Aspen DO

 

                    2015            Nurse visit         Bhupinder Aspen DO

 

                    3/23/2015           Office visit        Bhupinder Aspen DO

 

                    10/16/2014          Office visit        Bhupinder Aspen DO

 

                    2014           Nurse visit         Radha Weston APRN

 

                    7/10/2014           Nurse visit         Bhupinder Aspen DO

 

                    2014           Office visit        Bhupinder Aspen DO

 

                    2014           Nurse visit         Bhupinder Aspen DO

 

                    3/6/2014            Nurse visit         Bhupinder Aspen DO

 

                    2014            [facility]            EARNEST Lopez MD

 

                    2013          Nurse visit         Bhupinder Aspen DO

 

                    2013           Nurse visit         Bhupinder Aspen DO

 

                    2013            Office visit        Bhupinder Aspen DO

 

                    2013            Nurse visit         Bhupinder Aspen DO

 

                    2013            Nurse visit         Bhupinder Aspen DO

 

                    2013            Office visit        Bhupinder Aspen DO

 

                    4/3/2013            Nurse visit         Bhupinder Aspen DO

 

                    2013            Office visit        Bhupinder Aspen DO

 

                    10/16/2012          Nurse visit         Bhupinder Aspen DO

 

                    2012            Nurse visit         Bhupinder Aspen DO

 

                    2012            Voided              Bhupinder Aspen DO

 

                    2012            Nurse visit         Bhupinder Aspen DO

 

                    2012            Nurse visit         Bhupinder Aspen DO

 

                    2012           Nurse visit         Bhupinder Aspen DO

 

                    5/3/2012            Nurse visit         Bhupinder Aspen DO

 

                    2012           Nurse visit         Bhupinder Aspen DO

 

                    2012           Nurse visit         Bhupinder Aspen DO

 

                    2012           Nurse visit         Bhupinder Aspen DO

 

                    2011           Nurse visit         Bhupinder Louise DO

 

                    10/20/2011          Nurse visit         Bhupinder Louise DO

 

                    2011           Office visit        Bhupinder Aspen DO

 

                    2011           Nurse visit         Radha GREEN

 

                    2011            Office visit        Bhupinder Aspen DO

 

                    2011           Nurse visit         Bhupinder Aspen DO

 

                    2011            Office visit        Bhupinder Aspen DO

 

                    2011           Office visit        Bhupinder Aspen DO

 

                    5/10/2011           Office visit        Bhupinder Aspen DO

 

                    2011           Office visit        Bhupinder Louise DO

 

                    4/15/2011           Office visit        Devin Angel DO

 

                    2011           Office visit        Devin Angel DO

 

                    10/15/2010          Office visit        Devin Angel DO

 

                    2010           Office visit        Devin Angel DO

 

                    2010            Office visit        Devin Angel DO

 

                    2010           Office visit        Devin Angel DO

 

                    2010            Office visit        Devin Angel DO

 

                    3/8/2010            Office visit        Devin Masterson MD

 

                    2010            Surgery             Devin Masterson MD

 

                    2010            Office visit        Devin Angel DO

 

                    2010           Surgery             Devin Masterson MD

 

                    2010           Hospital            Devin Masterson MD

 

                    2010           Hospital            Devin Masterson MD

 

                    10/22/2009          Office visit        Devin Angel DO

## 2019-06-26 NOTE — XMS REPORT
MU2 Ambulatory Summary

                             Created on: 2016



Pauline Gan

External Reference #: [id number]

: 1950

Sex: Female



Demographics







                          Address                   [address]

[place]  [address]

 

                          Home Phone                [phone]

 

                          Preferred Language        English

 

                          Marital Status            Legally 

 

                          Church Affiliation     Unknown

 

                          Race                      White

 

                          Ethnic Group              Not  or 





Author







                          Bhupinder Aguilar

 

                          [facility]

 

                          Address                   [address]

[place]  [id number]



 

                          Phone                     [phone]







Care Team Providers







                    Care Team Member Name    Role                Phone

 

                    Bhupinder Louise    PCP                 Unavailable

 

                    Bhupinder Louise    PreferredProvider    Unavailable







Allergies and Adverse Reactions







                    Name                Reaction            Notes

 

                    NO KNOWN DRUG ALLERGIES                         







Plan of Treatment







             Planned Activity    Comments     Planned Date    Planned Time    Plan/Goal

 

             Injection, Subcutaneous/IM                 2016    12:00 AM      

 

             Injection, Subcutaneous/IM                 2016    12:00 AM      

 

             Mammography; bilateral                 2016    12:00 AM      

 

             Injection, Subcutaneous/IM                 2016    12:00 AM      

 

             CBC with Auto                 2013     12:00 AM      

 

             Lithium                   2013     12:00 AM      

 

             Basic metabolic panel                 2013     12:00 AM      

 

             Vitamin B12 (cyanocobalamin)                 2013     12:00 AM      

 

             Folic acid, serum                 2013     12:00 AM      

 

             Injection,Subcutaneous/Intramuscul                 2013    12:00 AM      







Medications







                                        Active 

 

             Name         Start Date    Estimated Completion Date    SIG          Comments

 

                Latuda 20 mg oral tablet                                    take 1 tablet (20 mg) by oral route once daily with

 food (at least 350 calories)            

 

             pravastatin 40 mg oral tablet    3/30/2015                 TAKE 1 TABLET BY MOUTH DAILY     

 

                Namenda XR 28 mg oral capsule,sprinkle,ER 24hr    2015                       take 1 capsule (28

 mg) by oral route once daily            

 

                Namenda XR 28 mg oral capsule,sprinkle,ER 24hr    2016                       take 1 capsule (28

 mg) by oral route once daily            

 

                triamcinolone acetonide 0.1 % topical cream    2016                        apply a thin layer to 

the affected area(s) by topical route 2 times per day     

 

                potassium chloride 10 mEq oral tablet extended release    2016                       take 1 tablet

 (10 meq) by oral route once daily       

 

             pravastatin 40 mg oral tablet    2016                 TAKE 1 TABLET BY MOUTH DAILY     

 

                Vitamin B-12 1,000 mcg/mL injection solution    2016                       inject 1 milliliter 

(1,000 mcg) by intramuscular route once a month     

 

                potassium chloride 10 mEq oral tablet extended release    2016                      take 1 tablet

 (10 meq) by oral route once daily       

 

                Namenda XR 28 mg oral capsule,sprinkle,ER 24hr    2016                      TAKE 1 CAPSULE BY

 MOUTH EVERY DAY                         

 

                furosemide 40 mg oral tablet    2016                      take 1 tablet (40 mg) by oral route

 once daily                              

 

                Bactrim -160 mg oral tablet    2016        take 1 tablet by oral route

 every 12 hours for 7 days               









                                         

 

             Name         Start Date    Expiration Date    SIG          Comments

 

             Reglan 10mg    3/29/2010    2010    one ac and hs     

 

                Keflex 500 mg oral capsule    2010       10/1/2010       take 1 capsule (500 mg) by oral

 route every 6 hours for 10 days         

 

                Bactrim -160 mg oral tablet    2011       take 1 tablet by oral route

 every 12 hours for 7 days               

 

                triamcinolone acetonide 0.1 % topical cream    2011      apply a thin

 layer to the affected area(s) by topical route 2 times per day     

 

                sertraline 100 mg oral tablet    4/10/2012       5/10/2012       take 1.5 tablets by oral route

 daily for 30 days                       

 

                ergocalciferol (vitamin D2) 50,000 unit oral capsule    4/15/2013       2013       TAKE

 ONE CAPSULE BY MOUTH ONCE A WEEK        

 

                CYANOCOBALAM 1000MCGINJ 1000 milliliter    2013       INJECT 1ML INTRAMUSCULAR

 ONCE A MONTH                            

 

                pravastatin 40 mg oral tablet    3/25/2014       3/20/2015       TAKE ONE TABLET BY MOUTH EVERY

 DAY                                     

 

                          Zostavax (PF) 19,400 unit/0.65 mL subcutaneous suspension for reconstitution    3/23/2015

                    3/24/2015           inject 0.65 milliliter by subcutaneous route once     

 

                famciclovir 500 mg oral tablet    12/3/2015       12/10/2015      take 1 tablet (500 mg) by

 oral route every 8 hours for 7 days     

 

                furosemide 40 mg oral tablet    2016      take 1 tablet (40 mg) by oral

 route once daily                        

 

                Cipro 500 mg oral tablet    2016       take 1 tablet (500 mg) by oral route

 2 times per day for 5 days              









                                        Discontinued 

 

             Name         Start Date    Discontinued Date    SIG          Comments

 

                Tylenol 325 mg oral tablet                    2013        take 1 - 2 tablets (325 -650 mg) by oral

 route every 4-6 hours as needed         

 

                Calcium 600 + D(3) 600 mg(1,500mg) -400 unit oral tablet                    2011       take 1 tablet

 by oral route 2 times a day            no longer taking

 

                Vitamin B-12 1,000 mcg oral tablet extended release    2010       take 1

 tablet by oral route daily             no longer taking

 

                Antifungal (clotrimazole) 1 % topical cream    2010       apply to the 

affected and surrounding areas of skin by topical route 2 times per day morning 
and evening                              

 

                sertraline 100 mg oral tablet    5/10/2011       2011       take 2 tablets (200 mg) by 

oral route once daily                   discontinued by Dr. Serrano

 

                mirtazapine 15 mg oral tablet                    2011        take 1 tablet (15 mg) by oral route 

once daily before bedtime               Dr. Serrano

 

                mirtazapine 15 mg oral tablet                    2011        take 1 tablet (15 mg) by oral route 

once daily before bedtime               dc'd by Dr. Serrano

 

                Pristiq 50 mg oral tablet extended release 24 hr                    2013        take 1 tablet (50

 mg) by oral route once daily           Dr. Serrano

 

                Pristiq 50 mg oral tablet extended release 24 hr                    2013        take 1 tablet (50

 mg) by oral route once daily           dose updated

 

                Vitamin B-12 1,000 mcg/mL injection solution    2011        inject 1 milliliter

 (1,000 mcg) by intramuscular route once a month    on list already

 

                    syringe with needle 1 mL 25 gauge x 1" miscellaneous syringe    2011

                          use for injection once a month     

 

                clotrimazole 1 % topical cream    2011        apply to the affected and surrounding

 areas of skin by topical route 2 times per day in the morning and evening     

 

                Vitamin D2 50,000 unit oral capsule    2011        take 1 capsule (50,000

 unit) by oral route once weekly        generic on list

 

                Pravachol 40 mg oral tablet    2012        take 1 tablet (40 mg) by oral 

route once daily for 90 days            generic on list

 

                lithium carbonate 300 mg oral capsule    2012        take 1 capsule by oral

 route daily                            dose updated

 

                Pristiq 100 mg oral tablet extended release 24 hr                    4/10/2012       take 1 and 1/2 

tablet (150 mg) by oral route once daily    Mental Health provider

 

                Pristiq 100 mg oral tablet extended release 24 hr                    4/10/2012       take 1 and 1/2 

tablet (150 mg) by oral route once daily    Discontinued by Dr Efrain [facility] at [facility]

 

                hydroxyzine HCl 50 mg oral tablet    10/16/2014      2015       take 1 tablet (50 mg) 

by oral route at bedtime                 

 

                lithium carbonate 300 mg oral capsule    2015       take 1 capsule (300

 mg) by oral route 2 for 30 days         

 

                fluconazole 100 mg oral tablet    2015       12/3/2015       take 1 tablet (100 mg) by 

oral route once a week                   

 

                ketoconazole 2 % topical cream    2015       12/3/2015       apply to the affected area(s)

 by topical route 2 times per day        

 

                prednisone 10 mg oral tablet    12/3/2015       2016        take 2 tablets (20 mg) by oral

 route once daily for 4 days 1 tablet daily for 4 days 0.5 tablet daily for 4 
days                                     







Problem List







                    Description         Status              Onset

 

                    Artificial opening status; colostomy    Active               

 

                    Bipolar disorder, unspecified    Active               

 

                    Hyperlipidemia      Active               

 

                    Peritoneal Neoplasm, Malignant    Active               

 

                    Anemia, Pernicious    Active               

 

                    Arthritis unspecified    Active               

 

                    B12 deficiency      Active               







Vital Signs







      Date    Time    BP-Sys(mm[Hg]    BP-Lynn(mm[Hg])    HR(bpm)    RR(rpm)    Temp    WT    HT    HC    BMI

                    BSA                 BMI Percentile      O2 Sat(%)

 

       2016    3:11:00 PM    134 mmHg    76 mmHg    80 bpm    20 rpm    98 F    163 lbs    69 in     

                24.07 kg/m2     1.90 m2                         98 %

 

        2016    2:04:00 PM    142 mmHg    86 mmHg    68 bpm    16 rpm    98.5 F    166 lbs    63 in

                          29.4053 kg/m    1.8295 m                 100 %

 

        2016    11:27:00 AM    148 mmHg    78 mmHg    90 bpm    20 rpm    98.2 F    153 lbs    69 in

                          22.59 kg/m2    1.84 m2                   96 %

 

        12/3/2015    9:50:00 AM    132 mmHg    70 mmHg    62 bpm    16 rpm    97.9 F    145 lbs    69 in

                          21.4125 kg/m    1.7894 m                 100 %

 

        2015    8:52:00 AM    132 mmHg    68 mmHg    52 bpm    20 rpm    97.8 F    141 lbs    69 in

                          20.82 kg/m2    1.76 m2                   100 %

 

        2015    3:25:00 PM    120 mmHg    62 mmHg    72 bpm    16 rpm    98.1 F    136 lbs    69 in

                          20.0835 kg/m    1.733 m                 98 %

 

       3/23/2015    2:55:00 PM    130 mmHg    76 mmHg    68 bpm    18 rpm    97 F    140 lbs    69 in    

                20.67 kg/m2     1.76 m2                         98 %

 

        10/16/2014    11:11:00 AM    120 mmHg    66 mmHg    77 bpm    20 rpm    98 F    130 lbs    69 in

                          19.1974 kg/m    1.6943 m                 100 %

 

        2014    3:21:00 PM    130 mmHg    66 mmHg    63 bpm    18 rpm    97.2 F    160 lbs    69 in

                          23.63 kg/m2    1.88 m2                   99 %

 

        2013    10:35:00 AM    132 mmHg    70 mmHg    66 bpm    20 rpm    98.1 F    157 lbs    69 in

                          23.1846 kg/m    1.862 m                  

 

        2013    1:29:00 PM    132 mmHg    70 mmHg    76 bpm    18 rpm    98.2 F    166 lbs    69 in 

                          24.51 kg/m2    1.91 m2                    

 

       2013    2:46:00 PM    128 mmHg    70 mmHg    76 bpm    16 rpm    98 F    160 lbs    69 in     

                23.6276 kg/m    1.8797 m                       

 

        2011    8:49:00 AM    128 mmHg    78 mmHg    70 bpm    18 rpm    97.9 F    164 lbs    69 in

                          24.22 kg/m2    1.90 m2                    

 

     2011    1:31:00 PM    132 mmHg    68 mmHg    84 bpm         97 F    167 lbs                        

                                         

 

        2011    9:09:00 AM    128 mmHg    70 mmHg    72 bpm    18 rpm    98.2 F    163 lbs    64 in 

                          27.98 kg/m2    1.83 m2                    

 

       2011    10:01:00 AM    132 mmHg    70 mmHg    72 bpm    18 rpm    98.2 F    154 lbs             

                                                                 

 

       2011    2:47:00 PM    128 mmHg    70 mmHg    72 bpm    18 rpm    97.8 F    156 lbs             

                                                                 

 

       5/10/2011    3:16:00 PM    144 mmHg    80 mmHg    72 bpm    18 rpm    98.2 F    158 lbs             

                                                                 

 

        2011    10:11:00 AM    132 mmHg    70 mmHg    70 bpm    18 rpm    98.2 F    168 lbs    69 in

                          24.81 kg/m2    1.93 m2                    

 

        4/15/2011    10:52:00 AM    110 mmHg    60 mmHg    75 bpm    16 rpm    97.5 F    172.375 lbs    

69 in                     25.4551 kg/m    1.951 m                 100 %

 

        2011    11:43:00 AM    120 mmHg    82 mmHg    75 bpm    16 rpm    97.2 F    178.5 lbs    69

 in                       26.36 kg/m2    1.99 m2                   100 %

 

        10/15/2010    1:32:00 PM    120 mmHg    70 mmHg    80 bpm    18 rpm    96.6 F    177 lbs    69 in

                          26.1381 kg/m    1.977 m                 100 %

 

        2010    3:50:00 PM    168 mmHg    100 mmHg    82 bpm    18 rpm    97.8 F    177.5 lbs    69

 in                       26.21 kg/m2    1.98 m2                   97 %

 

        2010    1:21:00 PM    140 mmHg    80 mmHg    59 bpm    16 rpm    97.6 F    173.25 lbs    69 

in                        25.5843 kg/m    1.956 m                 100 %

 

        2010    3:02:00 PM    140 mmHg    80 mmHg    61 bpm    16 rpm    97.6 F    173.125 lbs    69

 in                       25.57 kg/m2    1.96 m2                   99 %

 

        2010    1:23:00 PM    130 mmHg    80 mmHg    66 bpm    16 rpm    96.8 F    173 lbs    69 in 

                          25.5474 kg/m    1.9546 m                 100 %

 

        2010    12:58:00 PM    130 mmHg    88 mmHg    75 bpm    16 rpm    98.4 F    172.25 lbs    69

 in                       25.44 kg/m2    1.95 m2                   100 %







Social History







                    Name                Description         Comments

 

                    denies alcohol use                         

 

                    denies smoking                           

 

                    Denies illicit substance abuse                         

 

                    retired                                 direct care

 

                    Single                                   

 

                    Exercises regularly                         

 

                    Attended some college                         

 

                    Tobacco             Never smoker         







History of Procedures







                    Date Ordered        Description         Order Status

 

                    2010 12:00 AM    COMPREHEN METABOLIC PANEL    Reviewed

 

                    2010 12:00 AM    COMPLETE CBC W/AUTO DIFF WBC    Reviewed

 

                    2010 12:00 AM    LIPID PANEL         Reviewed

 

                          2015 12:00 AM        B12 Injection, Up to 1000 Mcg NDC#2176-8451-41 [facility] 

                                        Reviewed

 

                    2011 12:00 AM    MAMMOGRAM SCREENING    Reviewed

 

                    2011 12:00 AM    CYTOPATH C/V THIN LAYER    Reviewed

 

                    2011 12:00 AM    B12 Injection 1 cc NDC#[address]-80    Reviewed

 

                    2015 12:00 AM    THER/PROPH/DIAG INJ SC/IM    Reviewed

 

                    2015 12:00 AM    B12 Injection, Up to 1000 Mcg NDC#2219-9919-35    Reviewed

 

                    2011 12:00 AM    THER/PROPH/DIAG INJ SC/IM    Reviewed

 

                    2011 12:00 AM    B12 Injection([facility]) Ndc#8465-7796-31-    Reviewed

 

                    2015 12:00 AM    THER/PROPH/DIAG INJ SC/IM    Reviewed

 

                    2015 12:00 AM    B12 Injection, Up to 1000 Mcg NDC#4097-5730-49    Reviewed

 

                    10/20/2011 12:00 AM    THER/PROPH/DIAG INJ SC/IM    Reviewed

 

                    10/20/2011 12:00 AM    B12 Injection([facility]ella) Ndc#2425-1438-25-    Reviewed

 

                    2016 12:00 AM    THER/PROPH/DIAG INJ SC/IM    Reviewed

 

                    2016 12:00 AM    B12 Injection, Up to 1000 Mcg NDC#4743-3605-00    Reviewed

 

                    3/14/2016 12:00 AM    VITAMIN B-12        Reviewed

 

                    3/15/2016 12:00 AM    THER/PROPH/DIAG INJ SC/IM    Reviewed

 

                    3/15/2016 12:00 AM    B12 Injection, Up to 1000 Mcg NDC#1718-7967-95    Reviewed

 

                    2011 12:00 AM    ***Immunization administration, Medicare flu    Reviewed

 

                    2011 12:00 AM    Fluzone ** MEDICARE Only **    Reviewed

 

                    2011 12:00 AM    THER/PROPH/DIAG INJ SC/IM    Reviewed

 

                    2011 12:00 AM    B12 Injection ([facility]) Ndc#3289-4577-90    Reviewed

 

                    2016 12:00 AM    B12 Injection, Up to 1000 Mcg NDC#9138-4243-32 [facility] Medicare    

Reviewed

 

                    2016 12:00 AM    TTE W/DOPPLER COMPLETE    Reviewed

 

                    2016 12:00 AM    EXTREMITY STUDY     Returned

 

                          2016 12:00 AM        B12 Injection, Up to 1000 Mcg NDC#9383-5694-14 [facility] Medicare 

                                        Reviewed

 

                    2016 12:00 AM    THER/PROPH/DIAG INJ SC/IM    Reviewed

 

                    2012 12:00 AM    THER/PROPH/DIAG INJ SC/IM    Reviewed

 

                    2012 12:00 AM    B12 Injection ([employer] [facility]) Ndc#9451-1127-25    Reviewed

 

                    2016 12:00 AM    THER/PROPH/DIAG INJ SC/IM    Reviewed

 

                    2016 12:00 AM    B12 Injection, Up to 1000 Mcg NDC#9171-5341-42    Reviewed

 

                    2012 12:00 AM    THER/PROPH/DIAG INJ SC/IM    Reviewed

 

                    2012 12:00 AM    B12 Injection([place]) Ndc#6525-5991-56-    Reviewed

 

                    12/15/2016 12:00 AM    B12 Injection, Up to 1000 Mcg NDC#0426-3107-35    Reviewed

 

                    12/15/2016 12:00 AM    THER/PROPH/DIAG INJ SC/IM    Reviewed

 

                    2016 12:00 AM    URNLS DIP STICK/TABLET RGNT AUTO W/O MICROSCOPY    Returned

 

                    5/3/2012 12:00 AM    THER/PROPH/DIAG INJ SC/IM    Reviewed

 

                    5/3/2012 12:00 AM    B12 Injection([facility]) Ndc#0072-2817-78-    Reviewed

 

                    2012 12:00 AM    IMMUNOTHERAPY INJECTIONS    Reviewed

 

                    2012 12:00 AM    B12 Injection([facility]) Ndc#9997-7101-49-    Reviewed

 

                    2012 12:00 AM    THER/PROPH/DIAG INJ SC/IM    Reviewed

 

                    2012 12:00 AM    B12 Injection, Up to 1000 Mcg NDC#5624-0784-82    Reviewed

 

                    2012 12:00 AM    THER/PROPH/DIAG INJ SC/IM    Reviewed

 

                    2012 12:00 AM    B12 Injection, Up to 1000 Mcg NDC#3421-7980-73    Reviewed

 

                    2012 12:00 AM    THER/PROPH/DIAG INJ SC/IM    Reviewed

 

                    2012 12:00 AM    B12 Injection, Up to 1000 Mcg NDC#4301-1726-94    Reviewed

 

                    10/16/2012 12:00 AM    THER/PROPH/DIAG INJ SC/IM    Reviewed

 

                    10/16/2012 12:00 AM    B12 Injection, Up to 1000 Mcg NDC#3832-6130-48    Reviewed

 

                    2010 12:00 AM    COMPREHEN METABOLIC PANEL    Reviewed

 

                    2010 12:00 AM    COMPLETE CBC W/AUTO DIFF WBC    Reviewed

 

                    2010 12:00 AM    LIPID PANEL         Reviewed

 

                    2013 12:00 AM    Flu Injection 3 Years And Above NDC# [address]-38  [facility]    Reviewed



 

                    2013 12:00 AM    COMPLETE CBC W/AUTO DIFF WBC    Reviewed

 

                    2013 12:00 AM    ASSAY OF LITHIUM    Reviewed

 

                    2013 12:00 AM    METABOLIC PANEL TOTAL CA    Reviewed

 

                    4/3/2013 12:00 AM    THER/PROPH/DIAG INJ SC/IM    Reviewed

 

                    4/3/2013 12:00 AM    B12 Injection, Up to 1000 Mcg NDC#0211-1680-26    Reviewed

 

                    2013 12:00 AM    THER/PROPH/DIAG INJ SC/IM    Reviewed

 

                    2013 12:00 AM    B12 Injection, Up to 1000 Mcg NDC#2428-7766-35    Reviewed

 

                    2013 12:00 AM    THER/PROPH/DIAG INJ SC/IM    Reviewed

 

                    2013 12:00 AM    B12 Injection, Up to 1000 Mcg NDC#5969-8316-43    Reviewed

 

                    2013 12:00 AM    LIPID PANEL         Reviewed

 

                    2013 12:00 AM    VITAMIN D 25 HYDROXY    Reviewed

 

                    2013 12:00 AM    THER/PROPH/DIAG INJ SC/IM    Reviewed

 

                    3/6/2014 12:00 AM    THER/PROPH/DIAG INJ SC/IM    Reviewed

 

                    2014 12:00 AM    THER/PROPH/DIAG INJ SC/IM    Reviewed

 

                    2014 12:00 AM    B12 Injection, Up to 1000 Mcg NDC#7928-6622-40    Reviewed

 

                    2010 12:00 AM    SKIN FUNGI CULTURE    Reviewed

 

                    10/9/2010 12:00 AM    COMPREHEN METABOLIC PANEL    Reviewed

 

                    10/9/2010 12:00 AM    LIPID PANEL         Reviewed

 

                    2010 12:00 AM    THER/PROPH/DIAG INJ SC/IM    Reviewed

 

                    2010 12:00 AM    B12 Injection Ndc#[address]-25 ([facility])    Reviewed

 

                    2010 12:00 AM    THER/PROPH/DIAG INJ SC/IM    Reviewed

 

                    2010 12:00 AM    Kenalog 40 Mg Im-Ndc#[address]-54 ([facility])    Reviewed

 

                    10/15/2010 12:00 AM    FLU VACCINE 3 YRS & > IM    Reviewed

 

                    10/15/2010 12:00 AM    Admin.Of M/C Cov.Vaccine-Flu Vacc.    Reviewed

 

                    1/15/2011 12:00 AM    COMPLETE CBC W/AUTO DIFF WBC    Reviewed

 

                    1/15/2011 12:00 AM    COMPREHEN METABOLIC PANEL    Reviewed

 

                    1/15/2011 12:00 AM    LIPID PANEL         Reviewed

 

                    2014 12:00 AM    MAMMOGRAM SCREENING    Reviewed

 

                    2014 12:00 AM    Screening mammography, bilateral    Reviewed

 

                    7/10/2014 12:00 AM    THER/PROPH/DIAG INJ SC/IM    Reviewed

 

                    7/10/2014 12:00 AM    B12 Injection, Up to 1000 Mcg NDC#9726-7026-49    Reviewed

 

                    2011 12:00 AM    COMPLETE CBC W/AUTO DIFF WBC    Reviewed

 

                    2011 12:00 AM    COMPREHEN METABOLIC PANEL    Reviewed

 

                    2011 12:00 AM    LIPID PANEL         Reviewed

 

                    2014 12:00 AM    B12 Injection, Up to 1000 Mcg NDC#0506-4104-10    Reviewed

 

                    10/19/2014 12:00 AM    MAMMOGRAM SCREENING    Reviewed

 

                    10/19/2014 12:00 AM    Screening mammography, bilateral    Reviewed

 

                    10/16/2014 12:00 AM    COMPLETE CBC W/AUTO DIFF WBC    Reviewed

 

                    10/16/2014 12:00 AM    COMPREHEN METABOLIC PANEL    Reviewed

 

                    10/16/2014 12:00 AM    IMMUNOASSAY TUMOR     Reviewed

 

                    10/16/2014 12:00 AM    LIPID PANEL         Reviewed

 

                    10/16/2014 12:00 AM    ASSAY OF LITHIUM    Reviewed

 

                    10/16/2014 12:00 AM    MAMMOGRAM SCREENING    Reviewed

 

                    2011 12:00 AM    ASSAY OF PARATHORMONE    Reviewed

 

                    2011 12:00 AM    VITAMIN D 25 HYDROXY    Reviewed

 

                    2011 12:00 AM    ASSAY OF LITHIUM    Reviewed

 

                    2011 12:00 AM    METABOLIC PANEL TOTAL CA    Reviewed

 

                    2011 12:00 AM    CT HEAD/BRAIN W/O & W/DYE    Reviewed

 

                    3/23/2015 12:00 AM    PNEUMOCOCCAL VACC 13 GLENDY IM    Reviewed

 

                    3/23/2015 12:00 AM    Vitamin B12 injection    Reviewed

 

                    2011 12:00 AM    ASSAY OF LITHIUM    Reviewed

 

                    2011 12:00 AM    B12 Injection Ndc#[address]-84  [place]    Reviewed

 

                    2015 12:00 AM    THER/PROPH/DIAG INJ SC/IM    Reviewed

 

                    2015 12:00 AM    B12 Injection, Up to 1000 Mcg NDC#7504-0473-02    Reviewed

 

                    2015 12:00 AM    COMPLETE CBC W/AUTO DIFF WBC    Reviewed

 

                    2015 12:00 AM    COMPREHEN METABOLIC PANEL    Reviewed

 

                    2015 12:00 AM    LIPID PANEL         Reviewed

 

                    2015 12:00 AM    ASSAY OF LITHIUM    Reviewed

 

                    2011 12:00 AM    VIT D 1 25-DIHYDROXY    Reviewed

 

                    2011 12:00 AM    VITAMIN B-12        Reviewed

 

                    2015 12:00 AM    B12 Injection, Up to 1000 Mcg NDC#0186-4732-10    Reviewed

 

                    2015 12:00 AM    THER/PROPH/DIAG INJ SC/IM    Reviewed

 

                    2015 12:00 AM    B12 Injection, Up to 1000 Mcg NDC#9237-0906-04    Reviewed

 

                    2011 12:00 AM    THER/PROPH/DIAG INJ SC/IM    Reviewed

 

                    2011 12:00 AM    B12 Injection ([facility]) Ndc#4704-4788-10    Reviewed

 

                    2015 12:00 AM    THER/PROPH/DIAG INJ SC/IM    Reviewed

 

                    2015 12:00 AM    B12 Injection, Up to 1000 Mcg NDC#9451-4960-06    Reviewed







Results Summary







                          Data and Description      Results

 

                          2004 12:00 AM        Colonoscopy-Women and Men over 50 Normal 

 

                          2008 12:00 AM         Pap Smear Declined 

 

                          10/7/2009 12:00 AM        Cholest Cry Stone Ql .0 %LDLc SerPl-mCnc 123.0 mg/dLHDLc

 SerPl-mCnc 34.0 mg/dLTrigl SerPl-mCnc 190.0 mg/dLGlucose SerPl-mCnc 78.0 mg/dL

 

                          2009 12:00 AM        Mammogram -Women over 40 Normal HIV1+2 Ab Ser Ql no risk 

 

                          2010 8:47 AM         Dexa Bone Scan Refused Aspirin reccommended Contraindication 



 

                          2010 8:48 AM         Depression Done 

 

                          2010 12:00 AM         Foot Exam-Diabetic Done 

 

                          2010 12:00 AM         Cholest Cry Stone Ql .0 %LDLc SerPl-mCnc 126.0 mg/dLGlucose

 SerPl-mCnc 102.0 mg/dL

 

                          2010 8:45 AM          TRIGLYCERIDES 122.0 mg/dLCHOLESTEROL 186.0 mg/dLHDL 36.0 mg/dLTOT

 CHOL/HDL 5.2 LDL (CALC) 126.0 mg/dLGLUCOSE 102.0 mg/dLSODIUM 143.0 
mmol/LPOTASSIUM 3.70 mmol/LCHLORIDE 111.0 mmol/LCO2 23.0 mmol/LBUN 10.0 
mg/dLCREATININE 0.80 mg/dLSGOT/AST 12.0 IU/LSGPT/ALT 11.0 IU/LALK PHOS 65.0 
IU/LTOTAL PROTEIN 7.20 g/dLALBUMIN 3.90 g/dLTOTAL BILI 0.50 mg/dLCALCIUM 10.20 
mg/dLAGE 59 GFR NonAA 73 GFR AA 88 eGFR >60 mL/min/1.73 m2eGFR AA* >60 WBC 5.7 
RBC 3.26 HGB 10.60 g/dLHCT 31.70 %MCV 97.0 fLMCH 32.50 pgMCHC 33.40 g/dLRDW SD 
47 RDW CV 13.30 %MPV 9.70 fLPLT 287 NRBC# 0.00 NRBC% 0.0 %NEUT 62.90 %%LYMP 
21.80 %%MONO 9.90 %%EOS 5.0 %%BASO 0.40 %#NEUT 3.56 #LYMP 1.23 #MONO 0.56 #EOS 
0.28 #BASO 0.02 MANUAL DIFF NOT IND 

 

                          2010 12:00 AM        Glucose SerPl-mCnc 96.0 mg/dLCholest Cry Stone Ql .0 %LDLc

 SerPl-mCnc 146.0 mg/dL

 

                          2010 8:26 AM         TRIGLYCERIDES 106.0 mg/dLCHOLESTEROL 199.0 mg/dLHDL 32.0 mg/dLTOT

 CHOL/HDL 6.2 LDL (CALC) 146.0 mg/dLGLUCOSE 96.0 mg/dLSODIUM 143.0 
mmol/LPOTASSIUM 4.0 mmol/LCHLORIDE 113.0 mmol/LCO2 24.0 mmol/LBUN 13.0 
mg/dLCREATININE 1.0 mg/dLSGOT/AST 11.0 IU/LSGPT/ALT 6.0 IU/LALK PHOS 56.0 
IU/LTOTAL PROTEIN 6.60 g/dLALBUMIN 3.80 g/dLTOTAL BILI 0.50 mg/dLCALCIUM 9.30 
mg/dLAGE 59 GFR NonAA 57 GFR AA 69 eGFR 57 eGFR AA* >60 

 

                          10/6/2010 12:00 AM        Cholest Cry Stone Ql .0 %LDLc SerPl-mCnc 111.0 mg/dLGlucose

 SerPl-mCnc 81.0 mg/dL

 

                          10/6/2010 2:45 PM         TRIGLYCERIDES 123.0 mg/dLCHOLESTEROL 178.0 mg/dLHDL 42.0 mg/dLTOT

 CHOL/HDL 4.2 LDL (CALC) 111.0 mg/dLGLUCOSE 81.0 mg/dLSODIUM 139.0 
mmol/LPOTASSIUM 4.10 mmol/LCHLORIDE 106.0 mmol/LCO2 24.0 mmol/LBUN 13.0 
mg/dLCREATININE 0.90 mg/dLSGOT/AST 13.0 IU/LSGPT/ALT 11.0 IU/LALK PHOS 61.0 
IU/LTOTAL PROTEIN 7.10 g/dLALBUMIN 3.90 g/dLTOTAL BILI 0.30 mg/dLCALCIUM 9.30 
mg/dLAGE 60 GFR NonAA 64 GFR AA 78 eGFR >60 mL/min/1.73 m2eGFR AA* >60 WBC 6.9 
RBC 3.59 HGB 11.50 g/dLHCT 35.30 %MCV 98.0 fLMCH 32.0 pgMCHC 32.60 g/dLRDW SD 46
 RDW CV 12.90 %MPV 9.90 fLPLT 311 NRBC# 0.00 NRBC% 0.0 %NEUT 64.90 %%LYMP 22.50 
%%MONO 7.20 %%EOS 5.10 %%BASO 0.30 %#NEUT 4.45 #LYMP 1.54 #MONO 0.49 #EOS 0.35 
#BASO 0.02 MANUAL DIFF NOT IND 

 

                          2011 12:00 AM         Mammogram -Women over 40 Ordered 

 

                          2011 10:25 AM        TRIGLYCERIDES 111.0 mg/dLCHOLESTEROL 195.0 mg/dLHDL 43.0 mg/dLTOT

 CHOL/HDL 4.5 LDL (CALC) 130.0 mg/dLWBC 5.3 RBC 3.76 HGB 12.0 g/dLHCT 37.80 %MCV
 101.0 fLMCH 31.90 pgMCHC 31.70 g/dLRDW SD 47 RDW CV 13.0 %MPV 9.70 fLPLT 259 
NRBC# 0.00 NRBC% 0.0 %NEUT 69.0 %%LYMP 17.60 %%MONO 8.30 %%EOS 4.70 %%BASO 0.40 
%#NEUT 3.63 #LYMP 0.93 #MONO 0.44 #EOS 0.25 #BASO 0.02 MANUAL DIFF NOT IND 
GLUCOSE 102.0 mg/dLSODIUM 146.0 mmol/LPOTASSIUM 4.20 mmol/LCHLORIDE 113.0 
mmol/LCO2 23.0 mmol/LBUN 15.0 mg/dLCREATININE 1.0 mg/dLSGOT/AST 12.0 
IU/LSGPT/ALT 17.0 IU/LALK PHOS 60.0 IU/LTOTAL PROTEIN 6.90 g/dLALBUMIN 4.20 
g/dLTOTAL BILI 0.40 mg/dLCALCIUM 9.70 mg/dLAGE 60 GFR NonAA 57 GFR AA 69 eGFR 57
 eGFR AA* >60 

 

                          2011 11:49 AM        Cholest Cry Stone Ql .0 %LDLc SerPl-mCnc 130.0 mg/dLHDLc

 SerPl-mCnc 43.0 mg/dLTrigl SerPl-mCnc 111.0 mg/dLGlucose SerPl-mCnc 102.0 mg/dL

 

                          2011 11:52 AM        Pap Smear Declined 

 

                          2011 11:28 AM        Lithium 2.080 mmol/LGLUCOSE 102.0 mg/dLSODIUM 135.0 mmol/LPOTASSIUM

 3.90 mmol/LCHLORIDE 106.0 mmol/LCO2 21.0 mmol/LBUN 12.0 mg/dLCREATININE 1.30 
mg/dLCALCIUM 10.70 mg/dLAGE 60 GFR NonAA 42 GFR AA 51 eGFR 42 eGFR AA* 51 

 

                          2011 8:58 AM          Lithium 0.690 mmol/L

 

                          2011 2:38 PM         VITAMIN B12 3483.0 pg/mL

 

                          2013 3:35 PM          WBC 5.1 RBC 3.73 HGB 11.70 g/dLHCT 36.40 %MCV 98.0 fLMCH 31.40

 pgMCHC 32.10 g/dLRDW SD 47 RDW CV 13.10 %MPV 9.80 fLPLT 224 NRBC# 0.00 NRBC% 
0.0 %NEUT 66.80 %%LYMP 19.10 %%MONO 9.0 %%EOS 4.90 %%BASO 0.20 %#NEUT 3.42 #LYMP
 0.98 #MONO 0.46 #EOS 0.25 #BASO 0.01 MANUAL DIFF NOT IND GLUCOSE 88.0 
mg/dLSODIUM 141.0 mmol/LPOTASSIUM 4.10 mmol/LCHLORIDE 110.0 mmol/LCO2 22.0 
mmol/LBUN 22.0 mg/dLCREATININE 1.10 mg/dLCALCIUM 9.80 mg/dLAGE 62 GFR NonAA 50 
GFR AA 61 eGFR 50 eGFR AA* 60 Lithium 0.760 mmol/L

 

                          2013 11:02 AM        TRIGLYCERIDES 106.0 mg/dLCHOLESTEROL 181.0 mg/dLHDL 46.0 mg/dLTOT

 CHOL/HDL 3.9 LDL (CALC) 114.0 mg/dLVITAMIN D 41.10 ng/mL

 

                          10/17/2014 10:10 AM       WBC 5.0 RBC 3.66 HGB 11.60 g/dLHCT 36.80 %.0 fLMCH 31.70

 pgMCHC 31.50 g/dLRDW SD 50 RDW CV 13.50 %MPV 10.10 fLPLT 209 NRBC# 0.00 NRBC% 
0.0 %NEUT 69.20 %%LYMP 21.0 %%MONO 6.40 %%EOS 3.20 %%BASO 0.20 %#NEUT 3.46 #LYMP
 1.05 #MONO 0.32 #EOS 0.16 #BASO 0.01 MANUAL DIFF NOT IND GLUCOSE 100.0 
mg/dLSODIUM 148.0 mmol/LPOTASSIUM 3.90 mmol/LCHLORIDE 114.0 mmol/LCO2 26.0 
mmol/LBUN 12.0 mg/dLCREATININE 1.20 mg/dLSGOT/AST 9.0 IU/LSGPT/ALT <6 IU/LALK 
PHOS 82.0 IU/LTOTAL PROTEIN 6.90 g/dLALBUMIN 4.0 g/dLTOTAL BILI 0.40 
mg/dLCALCIUM 10.50 mg/dLAGE 64 GFR NonAA 45 GFR AA 55 eGFR 45 eGFR AA* 55 
TRIGLYCERIDES 96.0 mg/dLCHOLESTEROL 155.0 mg/dLHDL 38.0 mg/dLTOT CHOL/HDL 4.1 
LDL (CALC) 98.0 mg/dLLithium 0.850 mmol/LCancer Antigen (CA) 125 8.30 U/mL

 

                          2015 10:25 AM        Lithium 0.790 mmol/LWBC 4.8 RBC 3.44 HGB 11.0 g/dLHCT 35.20 

%.0 fLMCH 32.0 pgMCHC 31.30 g/dLRDW SD 53 RDW CV 14.0 %MPV 9.30 fLPLT 210
 NRBC# 0.00 NRBC% 0.0 %NEUT 70.80 %%LYMP 17.20 %%MONO 8.10 %%EOS 3.50 %%BASO 
0.40 %#NEUT 3.41 #LYMP 0.83 #MONO 0.39 #EOS 0.17 #BASO 0.02 MANUAL DIFF NOT IND 
TRIGLYCERIDES 107.0 mg/dLCHOLESTEROL 174.0 mg/dLHDL 43.0 mg/dLTOT CHOL/HDL 4.0 
LDL (CALC) 110.0 mg/dLGLUCOSE 90.0 mg/dLSODIUM 145.0 mmol/LPOTASSIUM 3.80 
mmol/LCHLORIDE 115.0 mmol/LCO2 24.0 mmol/LBUN 17.0 mg/dLCREATININE 1.30 
mg/dLSGOT/AST 18.0 IU/LSGPT/ALT 17.0 IU/LALK PHOS 56.0 IU/LTOTAL PROTEIN 6.70 
g/dLALBUMIN 3.90 g/dLTOTAL BILI 0.40 mg/dLCALCIUM 9.80 mg/dLAGE 64 GFR NonAA 41 
GFR AA 50 eGFR 41 eGFR AA* 50 

 

                          2015 8:50 AM        WBC 5.8 RBC 3.29 HGB 10.70 g/dLHCT 34.0 %.0 fLMCH 32.50

 pgMCHC 31.50 g/dLRDW SD 52 RDW CV 13.60 %MPV 9.60 fLPLT 223 NRBC# 0.00 NRBC% 
0.0 %NEUT 69.60 %%LYMP 18.90 %%MONO 8.50 %%EOS 2.80 %%BASO 0.20 %#NEUT 4.03 
#LYMP 1.09 #MONO 0.49 #EOS 0.16 #BASO 0.01 MANUAL DIFF NOT IND Lithium 0.620 
mmol/LGLUCOSE 83.0 mg/dLSODIUM 139.0 mmol/LPOTASSIUM 3.90 mmol/LCHLORIDE 109.0 
mmol/LCO2 22.0 mmol/LBUN 19.0 mg/dLCREATININE 1.40 mg/dLSGOT/AST 19.0 
IU/LSGPT/ALT 21.0 IU/LALK PHOS 55.0 IU/LTOTAL PROTEIN 6.50 g/dLALBUMIN 3.90 
g/dLTOTAL BILI 0.50 mg/dLCALCIUM 9.60 mg/dLAGE 65 GFR NonAA 38 GFR AA 46 eGFR 38
 eGFR AA* 46 TRIGLYCERIDES 121.0 mg/dLCHOLESTEROL 192.0 mg/dLHDL 51.0 mg/dLTOT 
CHOL/HDL 3.8 .0 mg/dLFREE T4 0.79 TSH 1.210 uIU/mLHemoglobin A1c 5.40 
%Estim. Avg Glu (eAG) 108 

 

                          3/15/2016 8:08 AM         VITAMIN B12 696.0 pg/mL

 

                          3/23/2016 8:26 AM         WBC 7.0 RBC 3.61 HGB 11.80 g/dLHCT 37.70 %.0 fLH 32.70

 pgMCHC 31.30 g/dLRDW SD 49 RDW CV 12.50 %MPV 10.0 fLPLT 207 NRBC# 0.00 NRBC% 
0.0 %NEUT 73.60 %%LYMP 16.40 %%MONO 6.60 %%EOS 3.0 %%BASO 0.30 %#NEUT 5.15 #LYMP
 1.15 #MONO 0.46 #EOS 0.21 #BASO 0.02 MANUAL DIFF NOT IND Lithium 0.940 
mmol/LGLUCOSE 108.0 mg/dLSODIUM 143.0 mmol/LPOTASSIUM 4.30 mmol/LCHLORIDE 110.0 
mmol/LCO2 27.0 mmol/LBUN 16.0 mg/dLCREATININE 1.60 mg/dLSGOT/AST 13.0 
IU/LSGPT/ALT 7.0 IU/LALK PHOS 71.0 IU/LTOTAL PROTEIN 6.80 g/dLALBUMIN 4.0 
g/dLTOTAL BILI 0.20 mg/dLCALCIUM 10.40 mg/dLAGE 65 GFR NonAA 32 GFR AA 39 eGFR 
32 eGFR AA* 39 TRIGLYCERIDES 113.0 mg/dLCHOLESTEROL 169.0 mg/dLHDL 42.0 mg/dLTOT
 CHOL/HDL 4.0 LDL (CALC) 104.0 mg/dLFREE T4 0.86 TSH 2.20 uIU/mLHemoglobin A1c 
5.20 %Estim. Avg Glu (eAG) 103 

 

                          3/25/2016 9:17 AM         COLOR YELLOW APPEARANCE CLEAR SPEC GRAV 1.010 pH 7.0 PROTEIN 

NEGATIVE GLUCOSE NEGATIVE mg/dLKETONE NEGATIVE BILIRUBIN NEGATIVE BLOOD NEGATIVE
 NITRITE NEGATIVE [place] SCREEN SMALL MICRO IND? SEE BELOW WBC/HPF 0-5 RBC/HPF 
NEGATIVE CASTS/LPF NEGATIVE /LPFCRYSTALS NEGATIVE MUCOUS THRDS NEGATIVE BACTERIA
 NEGATIVE EPITH CELLS FEW SQUAMOUS /HPFTRICHOMONAS NEGATIVE YEAST NEGATIVE 

 

                          2016 6:00 AM        GLUCOSE 91.0 mg/dLSODIUM 143.0 mmol/LPOTASSIUM 3.60 mmol/LCHLORIDE

 112.0 mmol/LCO2 23.0 mmol/LBUN 22.0 mg/dLCREATININE 1.20 mg/dLSGOT/AST 15.0 
IU/LSGPT/ALT 12.0 IU/LALK PHOS 61.0 IU/LTOTAL PROTEIN 5.40 g/dLALBUMIN 3.10 
g/dLTOTAL BILI 0.40 mg/dLCALCIUM 8.40 mg/dLAGE 66 GFR NonAA 45 GFR AA 55 eGFR 45
 eGFR AA* 55 WBC 3.0 RBC 3.05 HGB 9.80 g/dLHCT 32.10 %.0 fLMCH 32.10 
pgMCHC 30.50 g/dLRDW SD 54 RDW CV 14.20 %MPV 10.10 fLPLT 170 NRBC# 0.00 NRBC% 
0.0 %NEUT 50.70 %%LYMP 32.60 %%MONO 10.50 %%EOS 5.90 %%BASO 0.30 %#NEUT 1.54 
#LYMP 0.99 #MONO 0.32 #EOS 0.18 #BASO 0.01 MANUAL DIFF NOT IND 

 

                          2016 2:09 PM        COLOR YELLOW APPEARANCE CLEAR SPEC GRAV 1.010 pH 5.0 PROTEIN

 30 GLUCOSE NEGATIVE mg/dLKETONE NEGATIVE BILIRUBIN NEGATIVE BLOOD LARGE NITRITE
 NEGATIVE [place] SCREEN MODERATE MICRO INDICATED? SEE BELOW WBC/HPF  RBC/HPF
 20-50 CASTS/LPF NEGATIVE /LPFCRYSTALS NEGATIVE MUCOUS THRDS NEGATIVE BACTERIA 
NEGATIVE EPITH CELLS FEW SQUAMOUS /HPFTRICHOMONAS NEGATIVE YEAST NEGATIVE CULT 
SET UP? YES 







History Of Immunizations







       Name    Date Admin    Mfg Name    Mfg Code    Trade Name    Lot#    Route    Inj    Vis Given    Vis

 Pub                                    CVX

 

        Influenza    2008    Not Entered    NE      Not Entered            Not Entered    Not Entered

                    1            999

 

        X       12/19/2008    [employer]    MSD     Pneumovax 23            Intramuscular    Not Entered

                    1            999

 

           Influenza    10/15/2010    [id number]    NOV        Fluvirin > 12 Years    

[id number]     Intramuscular    Left Deltoid    10/15/2010    2009    999

 

          X         3/23/2015    [employer]    [facility]       Prevnar 13    [id number]    Intramuscular

                Right Gluteous Medius    3/23/2015       2013       109







History of Past Illness







                    Name                Date of Onset       Comments

 

                    Peritoneal Neoplasm, Malignant                         

 

                    Hyperlipidemia                           

 

                    Bipolar disorder, unspecified                         

 

                    Artificial opening status; colostomy                         

 

                    B12 deficiency                           

 

                    Anemia, Pernicious                         

 

                    Arthritis unspecified                         

 

                    cervical cancer                          

 

                    Artificial opening status; colostomy    2010  1:10PM     

 

                    Bipolar disorder, unspecified    2010  1:10PM     

 

                    Hyperlipidemia      2010  1:10PM     

 

                    Anemia, Pernicious    2010  1:10PM     

 

                    Postoperative Follow-Up    2010  1:55PM     

 

                    Postoperative Follow-Up    Mar  8 2010 10:57AM     

 

                    Artificial opening status; colostomy    Mar  8 2010  1:19PM     

 

                    Peritoneal Neoplasm, Malignant    Mar  8 2010  1:19PM     

 

                    Artificial opening status; colostomy    2010  1:40PM     

 

                    Hyperlipidemia      2010  1:40PM     

 

                    Anemia, Pernicious    2010  1:40PM     

 

                    Peritoneal Neoplasm, Malignant    2010  1:40PM     

 

                    Arthritis unspecified    2010  1:40PM     

 

                    Anemia of Chronic Illness    2010  1:40PM     

 

                    Tinea corporis      2010  3:17PM     

 

                    Bipolar disorder, unspecified    2010  1:33PM     

 

                    Hyperlipidemia      2010  1:33PM     

 

                    Anemia, Pernicious    2010  1:33PM     

 

                    Peritoneal Neoplasm, Malignant    2010  1:33PM     

 

                    B12 deficiency      2010  1:33PM     

 

                    Ethmoidal Sinusitis, Acute    Sep 21 2010  3:53PM     

 

                    Wheezing            Sep 21 2010  3:53PM     

 

                    Flu                 Oct 15 2010  1:40PM     

 

                    Bipolar disorder, unspecified    Oct 15 2010  1:42PM     

 

                    Hyperlipidemia      Oct 15 2010  1:42PM     

 

                    Anemia, Pernicious    Oct 15 2010  1:42PM     

 

                    Peritoneal Neoplasm, Malignant    Oct 15 2010  1:42PM     

 

                    Bipolar disorder, unspecified    2011 12:01PM     

 

                    Hyperlipidemia      2011 12:01PM     

 

                    Anemia, Pernicious    2011 12:01PM     

 

                    Peritoneal Neoplasm, Malignant    2011 12:01PM     

 

                    Bipolar disorder, unspecified    Apr 15 2011 10:55AM     

 

                    Major Depression    2011 10:11AM     

 

                    Bipolar Disorder    2011 10:11AM     

 

                    Cancer              May 10 2011  4:16PM     

 

                    Major Depression    May 10 2011  3:16PM     

 

                    Bipolar Disorder    May 10 2011  3:16PM     

 

                    Hypercalcemia       May 23 2011  2:47PM     

 

                    Bipolar disorder, unspecified    May 23 2011  2:47PM     

 

                    Colon Cancer, Personal History    May 23 2011  2:47PM     

 

                    Bipolar Disorder    May 31 2011  4:39PM     

 

                    Depressive Disorder    2011 10:01AM     

 

                    Vitamin B12 deficiency    2011 10:01AM     

 

                    Vitamin D Deficiency    2011  5:07PM     

 

                    Anemia, Vitamin B12 Deficiency    2011  5:07PM     

 

                    B12 deficiency      2011  3:56PM     

 

                    Routine gynecological examination    Aug  4 2011  9:08AM     

 

                    Screening Examination for Breast Cancer    Aug  4 2011  9:08AM     

 

                    Tinea Corporis      Aug  4 2011  9:08AM     

 

                    Depressive Disorder    Sep 23 2011  8:47AM     

 

                    Contact Dermatitis    Sep 23 2011  8:47AM     

 

                    Anemia, Pernicious    Sep 23 2011  8:47AM     

 

                    B12 deficiency      Sep 23 2011  8:47AM     

 

                    B12 deficiency      Sep 27 2011  2:58PM     

 

                    B12 deficiency      Oct 20 2011  2:34PM     

 

                    Flu                 Dec  9 2011  3:16PM     

 

                    B12 deficiency      Dec  9 2011  3:17PM     

 

                    B12 deficiency      2012  4:52PM     

 

                    B12 deficiency      2012 11:10AM     

 

                    B12 deficiency      2012  3:37PM     

 

                    B12 deficiency      May  3 2012  4:10PM     

 

                    B12 deficiency      2012  2:54PM     

 

                    B12 deficiency      2012 11:23AM     

 

                    B12 deficiency      Aug  9 2012  2:08PM     

 

                    B12 deficiency      Sep  6 2012  4:36PM     

 

                    B12 deficiency      Oct 16 2012 10:23AM     

 

                    Flu                 Feb  2013  3:11PM     

 

                    Bipolar disorder, unspecified    Feb  2013  2:48PM     

 

                    Anemia, Pernicious    Feb  2013  2:48PM     

 

                    B12 deficiency      Feb  2013  2:48PM     

 

                    Extrapyramidal abnormal movement disorder    Feb  4   2:48PM     

 

                    B12 deficiency      Apr  3 2013 12:03PM     

 

                    Bipolar disorder, unspecified    May  7 2013  1:31PM     

 

                    Anemia, Pernicious    May  7 2013  1:31PM     

 

                    B12 deficiency      May  7 2013  1:31PM     

 

                    Extrapyramidal abnormal movement disorder    May  7 2013  1:31PM     

 

                    B12 deficiency      2013  3:42PM     

 

                    B12 deficiency      2013  1:31PM     

 

                    Hyperlipidemia      Aug  7 2013 10:37AM     

 

                    Vitamin D Deficiency    Aug  7 2013 10:37AM     

 

                    Bipolar disorder, unspecified    Aug  7 2013 10:37AM     

 

                    Anemia, Pernicious    Aug  7 2013 10:37AM     

 

                    B12 deficiency      Aug  7 2013 10:37AM     

 

                    B12 deficiency      Sep 25 2013 11:15AM     

 

                    B12 deficiency      Dec 11 2013  3:16PM     

 

                    B12 deficiency      Mar  6 2014  1:48PM     

 

                    B12 deficiency      May 21 2014  3:17PM     

 

                    Screening Examination for Breast Cancer    2014  3:23PM     

 

                    Periumbilical abdominal pain    2014  3:23PM     

 

                    B12 deficiency      Jul 10 2014  2:52PM     

 

                    Anemia, Vitamin B12 Deficiency    Aug 13 2014  4:50PM     

 

                    Bipolar disorder    Oct 16 2014 11:13AM     

 

                    Hyperlipidemia      Oct 16 2014 11:13AM     

 

                    Anemia, Pernicious    Oct 16 2014 11:13AM     

 

                    Peritoneal Neoplasm, Malignant    Oct 16 2014 11:13AM     

 

                    Screening breast examination    Oct 16 2014 11:13AM     

 

                    Weight loss         Oct 16 2014 11:13AM     

 

                    Anemia, Pernicious    Mar 23 2015  2:57PM     

 

                    B12 deficiency      Mar 23 2015  2:57PM     

 

                    Need for Prevnar vaccine    Mar 23 2015  2:57PM     

 

                    Bipolar disorder    Mar 23 2015  2:57PM     

 

                    Hyperlipidemia      Mar 23 2015  2:57PM     

 

                    Anemia, Pernicious    Mar 23 2015  2:57PM     

 

                    Peritoneal Neoplasm, Malignant    Mar 23 2015  2:57PM     

 

                    B12 deficiency      May  4 2015  4:48PM     

 

                    Hyperlipidemia      May 13 2015  9:56AM     

 

                    Anemia              May 13 2015  9:56AM     

 

                    Bipolar disorder    May 13 2015  9:56AM     

 

                    Bipolar disorder    May 14 2015  3:27PM     

 

                    Hyperlipidemia      May 14 2015  3:27PM     

 

                    Anemia, Pernicious    May 14 2015  3:27PM     

 

                    Peritoneal Neoplasm, Malignant    May 14 2015  3:27PM     

 

                    B12 deficiency      2015  2:20PM     

 

                    B12 deficiency      2015 11:34AM     

 

                    B12 deficiency      Aug 18 2015  9:06AM     

 

                    Tinea Corporis      Sep 18 2015  8:54AM     

 

                    B12 deficiency      Sep 18 2015  8:54AM     

 

                    B12 deficiency      2015 10:28AM     

 

                    Herpes zoster without complication    Dec  3 2015  9:52AM     

 

                    B12 deficiency      Dec 23 2015 11:21AM     

 

                    B12 deficiency      2016  4:51PM     

 

                    Vitamin B 12 deficiency    Mar 14 2016  5:35PM     

 

                    B12 deficiency      Mar 15 2016 12:14PM     

 

                    B12 deficiency      May  5 2016 11:30AM     

 

                    Edema               May  5 2016 11:30AM     

 

                    Dermatitis          May  5 2016 11:30AM     

 

                    Edema               May 17 2016  8:38AM     

 

                    Shortness of breath    May 17 2016  8:38AM     

 

                    Bilateral edema of lower extremity    2016  2:06PM     

 

                    B12 deficiency      2016  2:06PM     

 

                    B12 deficiency      2016 11:50AM     

 

                    B12 deficiency      2016 11:20AM     

 

                    Diarrhea            Aug  2 2016  3:13PM     

 

                    B12 deficiency      Aug 24 2016 11:10AM     

 

                    Encounter for screening mammogram for breast cancer    Aug 24 2016 11:44AM     

 

                    B12 deficiency      Sep 28 2016  2:35PM     

 

                    B12 deficiency      Dec 15 2016  2:02PM     

 

                    Dysuria             Dec 29 2016 12:14PM     







Payers







           Insurance Name    Company Name    Plan Name    Plan Number    Policy Number    Policy Group

 Number                                 Start Date

 

                    Medicare Part A    Medicare RHC              [id number]              N/A

 

                          [employer]    [employer]                 [id number]

                                                    

 

                    Medicare Part A    Medicare - Lab/Xray              [id number]              2006

 

                    Medicare Part B    Medicare [place]              [id number]              2006

 

                          [employer]    [employer]                 50 percent

                                                    2009

 

                    [facility]    [employer]              [id number]              N/A

 

                    Medicare Part A    Medicare Part A              [id number]              N/A

 

                    Medicare Part A    Medicare Part A              [id number]              2006









History of Encounters







                    Visit Date          Visit Type          Provider

 

                    12/15/2016          Nurse visit         Bhupinder Louise DO

 

                    2016           Nurse visit         Bret GREEN

 

                    2016           Nurse visit         Bhupinder Louise DO

 

                    2016            Office visit        Bhupinder Louise DO

 

                    2016           Nurse visit         Bhupinder Louise DO

 

                    2016           Office visit        Bret GREEN

 

                    2016            Office visit        Bhupinder Aspen DO

 

                    3/15/2016           Nurse visit         Bhupinder Aspen DO

 

                    2016            Nurse visit         Bhupinder Aspen DO

 

                    2015          Nurse visit         Bhupinder Aspen DO

 

                    12/3/2015           Office visit        Bhupinder Aspen DO

 

                    2015          Nurse visit         Bhupinder Aspen DO

 

                    2015           Office visit        Bhupinder Aspen DO

 

                    2015           Nurse visit         Bhupinder Aspen DO

 

                    2015            Nurse visit         Bhupinder Aspen DO

 

                    2015            Nurse visit         Bhupinder Aspne DO

 

                    2015           Office visit        Bhupinder Aspen DO

 

                    2015            Nurse visit         Bhupinder Aspen DO

 

                    3/23/2015           Office visit        Bhupinder Aspen DO

 

                    10/16/2014          Office visit        Bhupinder Aspen DO

 

                    2014           Nurse visit         Radha Weston APRN

 

                    7/10/2014           Nurse visit         Bhupinder Aspen DO

 

                    2014           Office visit        Bhupinder Aspen DO

 

                    2014           Nurse visit         Bhupinder Apsen DO

 

                    3/6/2014            Nurse visit         Bhupinder Aspen DO

 

                    2014            [facility]            EARNEST Lopez MD

 

                    2013          Nurse visit         Bhupinder Aspen DO

 

                    2013           Nurse visit         Bhupinder Aspen DO

 

                    2013            Office visit        Bhupinder Aspen DO

 

                    2013            Nurse visit         Bhupinder Aspen DO

 

                    2013            Nurse visit         Bhupinder Aspen DO

 

                    2013            Office visit        Bhupinder Aspen DO

 

                    4/3/2013            Nurse visit         Bhupinder Aspen DO

 

                    2013            Office visit        Bhupinder Aspen DO

 

                    10/16/2012          Nurse visit         Bhupinder Aspen DO

 

                    2012            Nurse visit         Bhupinder Aspen DO

 

                    2012            Voided              Bhupinder Aspen DO

 

                    2012            Nurse visit         Bhupinder Aspen DO

 

                    2012            Nurse visit         Bhupinder Aspen DO

 

                    2012           Nurse visit         Bhupinder Aspen DO

 

                    5/3/2012            Nurse visit         Bhupinder Aspen DO

 

                    2012           Nurse visit         Bhupinder Aspen DO

 

                    2012           Nurse visit         Bhupinder Aspen DO

 

                    2012           Nurse visit         Bhupinder Aspen DO

 

                    2011           Nurse visit         Bhupinder Louise DO

 

                    10/20/2011          Nurse visit         Bhupinder Louise DO

 

                    2011           Office visit        Bhupinder Aspen DO

 

                    2011           Nurse visit         Radha GREEN

 

                    2011            Office visit        Bhupinder Aspen DO

 

                    2011           Nurse visit         Bhupinder Aspen DO

 

                    2011            Office visit        Bhupinder Louise DO

 

                    2011           Office visit        Bhupinder Louise DO

 

                    5/10/2011           Office visit        Bhupinder Aspen DO

 

                    2011           Office visit        Bhupinder Louise DO

 

                    4/15/2011           Office visit        Devin Angel DO

 

                    2011           Office visit        Devin Angel DO

 

                    10/15/2010          Office visit        Devin Angel DO

 

                    2010           Office visit        Devin Angel DO

 

                    2010            Office visit        Devin Angel DO

 

                    2010           Office visit        Devin Angel DO

 

                    2010            Office visit        Devin Angel DO

 

                    3/8/2010            Office visit        Devin Masterson MD

 

                    2010            Surgery             Devin Masterson MD

 

                    2010            Office visit        Devin Angel DO

 

                    2010           Surgery             Devin Masterson MD

 

                    2010           Hospital            Devin Masterson MD

 

                    2010           Hospital            Devin Masterson MD

 

                    10/22/2009          Office visit        Devin Angel DO

## 2019-06-26 NOTE — XMS REPORT
MU2 Ambulatory Summary

                             Created on: 2016



Pauline Gan

External Reference #: [id number]

: 1950

Sex: Female



Demographics







                          Address                   [address]

[place]  [id number]

 

                          Home Phone                [phone]

 

                          Preferred Language        English

 

                          Marital Status            Legally 

 

                          Confucianism Affiliation     Unknown

 

                          Race                      White

 

                          Ethnic Group              Not  or 





Author







                          Bhupinder Aguilar

 

                          [facility]

 

                          Address                   [address]

[place]  [phone]



 

                          Phone                     [phone]







Care Team Providers







                    Care Team Member Name    Role                Phone

 

                    Bhupinder Louise    PCP                 Unavailable







Allergies and Adverse Reactions







                    Name                Reaction            Notes

 

                    NO KNOWN DRUG ALLERGIES                         







Plan of Treatment







             Planned Activity    Comments     Planned Date    Planned Time    Plan/Goal

 

             THER/PROPH/DIAG INJ SC/IM                 2016    12:00 AM      

 

             THER/PROPH/DIAG INJ SC/IM                 2016    12:00 AM      

 

             COMPLETE CBC W/AUTO DIFF WBC                 2013     12:00 AM      

 

             ASSAY OF LITHIUM                 2013     12:00 AM      

 

             METABOLIC PANEL TOTAL CA                 2013     12:00 AM      

 

             VITAMIN B-12                 2013     12:00 AM      

 

             ASSAY OF FOLIC ACID SERUM                 2013     12:00 AM      

 

             THER/PROPH/DIAG INJ SC/IM                 2013    12:00 AM      







Medications







                                        Active 

 

             Name         Start Date    Estimated Completion Date    SIG          Comments

 

                Latuda 20 mg oral tablet                                    take 1 tablet (20 mg) by oral route once daily with

 food (at least 350 calories)            

 

             pravastatin 40 mg oral tablet    3/30/2015                 TAKE 1 TABLET BY MOUTH DAILY     

 

                Namenda XR 28 mg oral capsule,sprinkle,ER 24hr    2015                       take 1 capsule (28

 mg) by oral route once daily            

 

                Namenda XR 28 mg oral capsule,sprinkle,ER 24hr    2016                       take 1 capsule (28

 mg) by oral route once daily            

 

                triamcinolone acetonide 0.1 % topical cream    2016                        apply a thin layer to 

the affected area(s) by topical route 2 times per day     

 

                furosemide 40 mg oral tablet    2016      take 1 tablet (40 mg) by oral

 route once daily                        

 

                potassium chloride 10 mEq oral tablet extended release    2016                       take 1 tablet

 (10 meq) by oral route once daily       

 

             pravastatin 40 mg oral tablet    2016                 TAKE 1 TABLET BY MOUTH DAILY     

 

                Vitamin B-12 1,000 mcg/mL injection solution    2016                       inject 1 milliliter 

(1,000 mcg) by intramuscular route once a month     









                                         

 

             Name         Start Date    Expiration Date    SIG          Comments

 

             Reglan 10mg    3/29/2010    2010    one ac and hs     

 

                Keflex 500 mg oral capsule    2010       10/1/2010       take 1 capsule (500 mg) by oral

 route every 6 hours for 10 days         

 

                Bactrim -160 mg oral tablet    2011       take 1 tablet by oral route

 every 12 hours for 7 days               

 

                triamcinolone acetonide 0.1 % topical cream    2011      apply a thin

 layer to the affected area(s) by topical route 2 times per day     

 

                sertraline 100 mg oral tablet    4/10/2012       5/10/2012       take 1.5 tablets by oral route

 daily for 30 days                       

 

                ergocalciferol (vitamin D2) 50,000 unit oral capsule    4/15/2013       2013       TAKE

 ONE CAPSULE BY MOUTH ONCE A WEEK        

 

                CYANOCOBALAM 1000MCGINJ 1000 milliliter    2013       INJECT 1ML INTRAMUSCULAR

 ONCE A MONTH                            

 

                pravastatin 40 mg oral tablet    3/25/2014       3/20/2015       TAKE ONE TABLET BY MOUTH EVERY

 DAY                                     

 

                          Zostavax (PF) 19,400 unit/0.65 mL subcutaneous suspension for reconstitution    3/23/2015

                    3/24/2015           inject 0.65 milliliter by subcutaneous route once     

 

                famciclovir 500 mg oral tablet    12/3/2015       12/10/2015      take 1 tablet (500 mg) by

 oral route every 8 hours for 7 days     

 

                Cipro 500 mg oral tablet    2016       take 1 tablet (500 mg) by oral route

 2 times per day for 5 days              









                                        Discontinued 

 

             Name         Start Date    Discontinued Date    SIG          Comments

 

                Tylenol 325 mg oral tablet                    2013        take 1 - 2 tablets (325 -650 mg) by oral

 route every 4-6 hours as needed         

 

                Calcium 600 + D(3) 600 mg(1,500mg) -400 unit oral tablet                    2011       take 1 tablet

 by oral route 2 times a day            no longer taking

 

                Vitamin B-12 1,000 mcg oral tablet extended release    2010       take 1

 tablet by oral route daily             no longer taking

 

                Antifungal (clotrimazole) 1 % topical cream    2010       apply to the 

affected and surrounding areas of skin by topical route 2 times per day morning 
and evening                              

 

                sertraline 100 mg oral tablet    5/10/2011       2011       take 2 tablets (200 mg) by 

oral route once daily                   discontinued by Dr. Serrano

 

                mirtazapine 15 mg oral tablet                    2011        take 1 tablet (15 mg) by oral route 

once daily before bedtime               Dr. Serrano

 

                mirtazapine 15 mg oral tablet                    2011        take 1 tablet (15 mg) by oral route 

once daily before bedtime               dc'd by Dr. Serrano

 

                Pristiq 50 mg oral tablet extended release 24 hr                    2013        take 1 tablet (50

 mg) by oral route once daily           Dr. Serrano

 

                Pristiq 50 mg oral tablet extended release 24 hr                    2013        take 1 tablet (50

 mg) by oral route once daily           dose updated

 

                Vitamin B-12 1,000 mcg/mL injection solution    2011        inject 1 milliliter

 (1,000 mcg) by intramuscular route once a month    on list already

 

                    syringe with needle 1 mL 25 gauge x 1" miscellaneous syringe    2011

                          use for injection once a month     

 

                clotrimazole 1 % topical cream    2011        apply to the affected and surrounding

 areas of skin by topical route 2 times per day in the morning and evening     

 

                Vitamin D2 50,000 unit oral capsule    2011        take 1 capsule (50,000

 unit) by oral route once weekly        generic on list

 

                Pravachol 40 mg oral tablet    2012        take 1 tablet (40 mg) by oral 

route once daily for 90 days            generic on list

 

                lithium carbonate 300 mg oral capsule    2012        take 1 capsule by oral

 route daily                            dose updated

 

                Pristiq 100 mg oral tablet extended release 24 hr                    4/10/2012       take 1 and 1/2 

tablet (150 mg) by oral route once daily    Mental Health provider

 

                Pristiq 100 mg oral tablet extended release 24 hr                    4/10/2012       take 1 and 1/2 

tablet (150 mg) by oral route once daily    Discontinued by Dr Efrain Knight at [facility]

 

                hydroxyzine HCl 50 mg oral tablet    10/16/2014      2015       take 1 tablet (50 mg) 

by oral route at bedtime                 

 

                lithium carbonate 300 mg oral capsule    2015       take 1 capsule (300

 mg) by oral route 2 for 30 days         

 

                fluconazole 100 mg oral tablet    2015       12/3/2015       take 1 tablet (100 mg) by 

oral route once a week                   

 

                ketoconazole 2 % topical cream    2015       12/3/2015       apply to the affected area(s)

 by topical route 2 times per day        

 

                prednisone 10 mg oral tablet    12/3/2015       2016        take 2 tablets (20 mg) by oral

 route once daily for 4 days 1 tablet daily for 4 days 0.5 tablet daily for 4 
days                                     







Problem List







                    Description         Status              Onset

 

                    Artificial opening status; colostomy    Active               

 

                    Bipolar disorder, unspecified    Active               

 

                    Hyperlipidemia      Active               

 

                    Peritoneal Neoplasm, Malignant    Active               

 

                    Anemia, Pernicious    Active               

 

                    Arthritis unspecified    Active               

 

                    B12 deficiency      Active               







Vital Signs







      Date    Time    BP-Sys(mm[Hg]    BP-Lynn(mm[Hg])    HR(bpm)    RR(rpm)    Temp    WT    HT    HC    BMI

                    BSA                 BMI Percentile      O2 Sat(%)

 

       2016    3:11:00 PM    134 mmHg    76 mmHg    80 bpm    20 rpm    98 F    163 lbs    69 in     

                24.07 kg/m2     1.90 m2                         98 %

 

        2016    2:04:00 PM    142 mmHg    86 mmHg    68 bpm    16 rpm    98.5 F    166 lbs    63 in

                          29.4053 kg/m    1.8295 m                 100 %

 

        2016    11:27:00 AM    148 mmHg    78 mmHg    90 bpm    20 rpm    98.2 F    153 lbs    69 in

                          22.59 kg/m2    1.84 m2                   96 %

 

        12/3/2015    9:50:00 AM    132 mmHg    70 mmHg    62 bpm    16 rpm    97.9 F    145 lbs    69 in

                          21.4125 kg/m    1.7894 m                 100 %

 

        2015    8:52:00 AM    132 mmHg    68 mmHg    52 bpm    20 rpm    97.8 F    141 lbs    69 in

                          20.82 kg/m2    1.76 m2                   100 %

 

        2015    3:25:00 PM    120 mmHg    62 mmHg    72 bpm    16 rpm    98.1 F    136 lbs    69 in

                          20.0835 kg/m    1.733 m                 98 %

 

       3/23/2015    2:55:00 PM    130 mmHg    76 mmHg    68 bpm    18 rpm    97 F    140 lbs    69 in    

                20.67 kg/m2     1.76 m2                         98 %

 

        10/16/2014    11:11:00 AM    120 mmHg    66 mmHg    77 bpm    20 rpm    98 F    130 lbs    69 in

                          19.1974 kg/m    1.6943 m                 100 %

 

        2014    3:21:00 PM    130 mmHg    66 mmHg    63 bpm    18 rpm    97.2 F    160 lbs    69 in

                          23.63 kg/m2    1.88 m2                   99 %

 

        2013    10:35:00 AM    132 mmHg    70 mmHg    66 bpm    20 rpm    98.1 F    157 lbs    69 in

                          23.1846 kg/m    1.862 m                  

 

        2013    1:29:00 PM    132 mmHg    70 mmHg    76 bpm    18 rpm    98.2 F    166 lbs    69 in 

                          24.51 kg/m2    1.91 m2                    

 

       2013    2:46:00 PM    128 mmHg    70 mmHg    76 bpm    16 rpm    98 F    160 lbs    69 in     

                23.6276 kg/m    1.8797 m                       

 

        2011    8:49:00 AM    128 mmHg    78 mmHg    70 bpm    18 rpm    97.9 F    164 lbs    69 in

                          24.22 kg/m2    1.90 m2                    

 

     2011    1:31:00 PM    132 mmHg    68 mmHg    84 bpm         97 F    167 lbs                        

                                         

 

        2011    9:09:00 AM    128 mmHg    70 mmHg    72 bpm    18 rpm    98.2 F    163 lbs    64 in 

                          27.98 kg/m2    1.83 m2                    

 

       2011    10:01:00 AM    132 mmHg    70 mmHg    72 bpm    18 rpm    98.2 F    154 lbs             

                                                                 

 

       2011    2:47:00 PM    128 mmHg    70 mmHg    72 bpm    18 rpm    97.8 F    156 lbs             

                                                                 

 

       5/10/2011    3:16:00 PM    144 mmHg    80 mmHg    72 bpm    18 rpm    98.2 F    158 lbs             

                                                                 

 

        2011    10:11:00 AM    132 mmHg    70 mmHg    70 bpm    18 rpm    98.2 F    168 lbs    69 in

                          24.81 kg/m2    1.93 m2                    

 

        4/15/2011    10:52:00 AM    110 mmHg    60 mmHg    75 bpm    16 rpm    97.5 F    172.375 lbs    

69 in                     25.4551 kg/m    1.951 m                 100 %

 

        2011    11:43:00 AM    120 mmHg    82 mmHg    75 bpm    16 rpm    97.2 F    178.5 lbs    69

 in                       26.36 kg/m2    1.99 m2                   100 %

 

        10/15/2010    1:32:00 PM    120 mmHg    70 mmHg    80 bpm    18 rpm    96.6 F    177 lbs    69 in

                          26.1381 kg/m    1.977 m                 100 %

 

        2010    3:50:00 PM    168 mmHg    100 mmHg    82 bpm    18 rpm    97.8 F    177.5 lbs    69

 in                       26.21 kg/m2    1.98 m2                   97 %

 

        2010    1:21:00 PM    140 mmHg    80 mmHg    59 bpm    16 rpm    97.6 F    173.25 lbs    69 

in                        25.5843 kg/m    1.956 m                 100 %

 

        2010    3:02:00 PM    140 mmHg    80 mmHg    61 bpm    16 rpm    97.6 F    173.125 lbs    69

 in                       25.57 kg/m2    1.96 m2                   99 %

 

        2010    1:23:00 PM    130 mmHg    80 mmHg    66 bpm    16 rpm    96.8 F    173 lbs    69 in 

                          25.5474 kg/m    1.9546 m                 100 %

 

        2010    12:58:00 PM    130 mmHg    88 mmHg    75 bpm    16 rpm    98.4 F    172.25 lbs    69

 in                       25.44 kg/m2    1.95 m2                   100 %







Social History







                    Name                Description         Comments

 

                    denies alcohol use                         

 

                    denies smoking                           

 

                    Denies illicit substance abuse                         

 

                    retired                                 direct care

 

                    Single                                   

 

                    Exercises regularly                         

 

                    Attended some college                         

 

                    Tobacco             Never smoker         







History of Procedures







                    Date Ordered        Description         Order Status

 

                    2010 12:00 AM    COMPREHEN METABOLIC PANEL    Reviewed

 

                    2010 12:00 AM    COMPLETE CBC W/AUTO DIFF WBC    Reviewed

 

                    2010 12:00 AM    LIPID PANEL         Reviewed

 

                          2015 12:00 AM        B12 Injection, Up to 1000 Mcg NDC#0528-2273-11 [facility] 

                                        Reviewed

 

                    2011 12:00 AM    MAMMOGRAM SCREENING    Reviewed

 

                    2011 12:00 AM    CYTOPATH C/V THIN LAYER    Reviewed

 

                    2011 12:00 AM    B12 Injection 1 cc NDC#[address]-58    Reviewed

 

                    2015 12:00 AM    THER/PROPH/DIAG INJ SC/IM    Reviewed

 

                    2015 12:00 AM    B12 Injection, Up to 1000 Mcg NDC#1592-6474-58    Reviewed

 

                    2011 12:00 AM    THER/PROPH/DIAG INJ SC/IM    Reviewed

 

                    2011 12:00 AM    B12 Injection([place]) Ndc#5515-8965-00-    Reviewed

 

                    2015 12:00 AM    THER/PROPH/DIAG INJ SC/IM    Reviewed

 

                    2015 12:00 AM    B12 Injection, Up to 1000 Mcg NDC#5346-6640-19    Reviewed

 

                    10/20/2011 12:00 AM    THER/PROPH/DIAG INJ SC/IM    Reviewed

 

                    10/20/2011 12:00 AM    B12 Injection([place]) Ndc#5988-8819-16-    Reviewed

 

                    2016 12:00 AM    THER/PROPH/DIAG INJ SC/IM    Reviewed

 

                    2016 12:00 AM    B12 Injection, Up to 1000 Mcg NDC#0253-8645-55    Reviewed

 

                    3/14/2016 12:00 AM    VITAMIN B-12        Reviewed

 

                    3/15/2016 12:00 AM    THER/PROPH/DIAG INJ SC/IM    Reviewed

 

                    3/15/2016 12:00 AM    B12 Injection, Up to 1000 Mcg NDC#8245-5123-17    Reviewed

 

                    2011 12:00 AM    ***Immunization administration, Medicare flu    Reviewed

 

                    2011 12:00 AM    Fluzone ** MEDICARE Only **    Reviewed

 

                    2011 12:00 AM    THER/PROPH/DIAG INJ SC/IM    Reviewed

 

                    2011 12:00 AM    B12 Injection ([facility]) Ndc#1938-4530-01    Reviewed

 

                    2016 12:00 AM    B12 Injection, Up to 1000 Mcg NDC#6723-5598-36 [facility]    

Reviewed

 

                    2016 12:00 AM    TTE W/DOPPLER COMPLETE    Reviewed

 

                    2016 12:00 AM    EXTREMITY STUDY     Returned

 

                          2016 12:00 AM        B12 Injection, Up to 1000 Mcg NDC#9696-7057-83 [facility] Medicare 

                                        Reviewed

 

                    2016 12:00 AM    THER/PROPH/DIAG INJ SC/IM    Reviewed

 

                    2016 12:00 AM    THER/PROPH/DIAG INJ SC/IM    Reviewed

 

                    2016 12:00 AM    B12 Injection, Up to 1000 Mcg NDC#1880-4362-44    Reviewed

 

                    2012 12:00 AM    THER/PROPH/DIAG INJ SC/IM    Reviewed

 

                    2012 12:00 AM    B12 Injection ([employer] [facility]) Ndc#9687-7786-66    Reviewed

 

                    2012 12:00 AM    THER/PROPH/DIAG INJ SC/IM    Reviewed

 

                    2012 12:00 AM    B12 Injection([place]) Ndc#9847-6029-90-    Reviewed

 

                    5/3/2012 12:00 AM    THER/PROPH/DIAG INJ SC/IM    Reviewed

 

                    5/3/2012 12:00 AM    B12 Injection([place]) Ndc#6995-7920-27-    Reviewed

 

                    2012 12:00 AM    IMMUNOTHERAPY INJECTIONS    Reviewed

 

                    2012 12:00 AM    B12 Injection([place]) Ndc#3184-1623-41-    Reviewed

 

                    2012 12:00 AM    THER/PROPH/DIAG INJ SC/IM    Reviewed

 

                    2012 12:00 AM    B12 Injection, Up to 1000 Mcg NDC#8210-1440-00    Reviewed

 

                    2012 12:00 AM    THER/PROPH/DIAG INJ SC/IM    Reviewed

 

                    2012 12:00 AM    B12 Injection, Up to 1000 Mcg NDC#6627-4504-03    Reviewed

 

                    2012 12:00 AM    THER/PROPH/DIAG INJ SC/IM    Reviewed

 

                    2012 12:00 AM    B12 Injection, Up to 1000 Mcg NDC#3252-5334-05    Reviewed

 

                    10/16/2012 12:00 AM    THER/PROPH/DIAG INJ SC/IM    Reviewed

 

                    10/16/2012 12:00 AM    B12 Injection, Up to 1000 Mcg NDC#1992-0950-04    Reviewed

 

                    2010 12:00 AM    COMPREHEN METABOLIC PANEL    Reviewed

 

                    2010 12:00 AM    COMPLETE CBC W/AUTO DIFF WBC    Reviewed

 

                    2010 12:00 AM    LIPID PANEL         Reviewed

 

                    2013 12:00 AM    Flu Injection 3 Years And Above NDC# [address]-76  [facility]    Reviewed



 

                    2013 12:00 AM    COMPLETE CBC W/AUTO DIFF WBC    Reviewed

 

                    2013 12:00 AM    ASSAY OF LITHIUM    Reviewed

 

                    2013 12:00 AM    METABOLIC PANEL TOTAL CA    Reviewed

 

                    4/3/2013 12:00 AM    THER/PROPH/DIAG INJ SC/IM    Reviewed

 

                    4/3/2013 12:00 AM    B12 Injection, Up to 1000 Mcg NDC#0897-8158-36    Reviewed

 

                    2013 12:00 AM    THER/PROPH/DIAG INJ SC/IM    Reviewed

 

                    2013 12:00 AM    B12 Injection, Up to 1000 Mcg NDC#0424-1920-00    Reviewed

 

                    2013 12:00 AM    THER/PROPH/DIAG INJ SC/IM    Reviewed

 

                    2013 12:00 AM    B12 Injection, Up to 1000 Mcg NDC#2035-8137-27    Reviewed

 

                    2013 12:00 AM    LIPID PANEL         Reviewed

 

                    2013 12:00 AM    VITAMIN D 25 HYDROXY    Reviewed

 

                    2013 12:00 AM    THER/PROPH/DIAG INJ SC/IM    Reviewed

 

                    3/6/2014 12:00 AM    THER/PROPH/DIAG INJ SC/IM    Reviewed

 

                    2014 12:00 AM    THER/PROPH/DIAG INJ SC/IM    Reviewed

 

                    2014 12:00 AM    B12 Injection, Up to 1000 Mcg NDC#7232-8543-26    Reviewed

 

                    2010 12:00 AM    SKIN FUNGI CULTURE    Reviewed

 

                    10/9/2010 12:00 AM    COMPREHEN METABOLIC PANEL    Reviewed

 

                    10/9/2010 12:00 AM    LIPID PANEL         Reviewed

 

                    2010 12:00 AM    THER/PROPH/DIAG INJ SC/IM    Reviewed

 

                    2010 12:00 AM    B12 Injection Ndc#[address]-33 ([place])    Reviewed

 

                    2010 12:00 AM    THER/PROPH/DIAG INJ SC/IM    Reviewed

 

                    2010 12:00 AM    Kenalog 40 Mg Im-Ndc#[address]-03 ([place])    Reviewed

 

                    10/15/2010 12:00 AM    FLU VACCINE 3 YRS & > IM    Reviewed

 

                    10/15/2010 12:00 AM    Admin.Of M/C Cov.Vaccine-Flu Vacc.    Reviewed

 

                    1/15/2011 12:00 AM    COMPLETE CBC W/AUTO DIFF WBC    Reviewed

 

                    1/15/2011 12:00 AM    COMPREHEN METABOLIC PANEL    Reviewed

 

                    1/15/2011 12:00 AM    LIPID PANEL         Reviewed

 

                    2014 12:00 AM    MAMMOGRAM SCREENING    Reviewed

 

                    2014 12:00 AM    Screening mammography, bilateral    Reviewed

 

                    7/10/2014 12:00 AM    THER/PROPH/DIAG INJ SC/IM    Reviewed

 

                    7/10/2014 12:00 AM    B12 Injection, Up to 1000 Mcg NDC#0989-7565-00    Reviewed

 

                    2011 12:00 AM    COMPLETE CBC W/AUTO DIFF WBC    Reviewed

 

                    2011 12:00 AM    COMPREHEN METABOLIC PANEL    Reviewed

 

                    2011 12:00 AM    LIPID PANEL         Reviewed

 

                    2014 12:00 AM    B12 Injection, Up to 1000 Mcg NDC#8721-1558-92    Reviewed

 

                    10/19/2014 12:00 AM    MAMMOGRAM SCREENING    Reviewed

 

                    10/19/2014 12:00 AM    Screening mammography, bilateral    Reviewed

 

                    10/16/2014 12:00 AM    COMPLETE CBC W/AUTO DIFF WBC    Reviewed

 

                    10/16/2014 12:00 AM    COMPREHEN METABOLIC PANEL    Reviewed

 

                    10/16/2014 12:00 AM    IMMUNOASSAY TUMOR     Reviewed

 

                    10/16/2014 12:00 AM    LIPID PANEL         Reviewed

 

                    10/16/2014 12:00 AM    ASSAY OF LITHIUM    Reviewed

 

                    10/16/2014 12:00 AM    MAMMOGRAM SCREENING    Reviewed

 

                    2011 12:00 AM    ASSAY OF PARATHORMONE    Reviewed

 

                    2011 12:00 AM    VITAMIN D 25 HYDROXY    Reviewed

 

                    2011 12:00 AM    ASSAY OF LITHIUM    Reviewed

 

                    2011 12:00 AM    METABOLIC PANEL TOTAL CA    Reviewed

 

                    2011 12:00 AM    CT HEAD/BRAIN W/O & W/DYE    Reviewed

 

                    3/23/2015 12:00 AM    PNEUMOCOCCAL VACC 13 GLENDY IM    Reviewed

 

                    3/23/2015 12:00 AM    Vitamin B12 injection    Reviewed

 

                    2011 12:00 AM    ASSAY OF LITHIUM    Reviewed

 

                    2011 12:00 AM    B12 Injection Ndc#[address]-79  [facility]    Reviewed

 

                    2015 12:00 AM    THER/PROPH/DIAG INJ SC/IM    Reviewed

 

                    2015 12:00 AM    B12 Injection, Up to 1000 Mcg NDC#2283-8004-93    Reviewed

 

                    2015 12:00 AM    COMPLETE CBC W/AUTO DIFF WBC    Reviewed

 

                    2015 12:00 AM    COMPREHEN METABOLIC PANEL    Reviewed

 

                    2015 12:00 AM    LIPID PANEL         Reviewed

 

                    2015 12:00 AM    ASSAY OF LITHIUM    Reviewed

 

                    2011 12:00 AM    VIT D 1 25-DIHYDROXY    Reviewed

 

                    2011 12:00 AM    VITAMIN B-12        Reviewed

 

                    2015 12:00 AM    B12 Injection, Up to 1000 Mcg NDC#1121-2055-14    Reviewed

 

                    2015 12:00 AM    THER/PROPH/DIAG INJ SC/IM    Reviewed

 

                    2015 12:00 AM    B12 Injection, Up to 1000 Mcg NDC#9502-3447-95    Reviewed

 

                    2011 12:00 AM    THER/PROPH/DIAG INJ SC/IM    Reviewed

 

                    2011 12:00 AM    B12 Injection ([facility]) Ndc#0484-6406-74    Reviewed

 

                    2015 12:00 AM    THER/PROPH/DIAG INJ SC/IM    Reviewed

 

                    2015 12:00 AM    B12 Injection, Up to 1000 Mcg NDC#6002-0464-73    Reviewed







Results Summary







                          Data and Description      Results

 

                          2004 12:00 AM        Colonoscopy-Women and Men over 50 Normal 

 

                          2008 12:00 AM         Pap Smear Declined 

 

                          10/7/2009 12:00 AM        Cholest Cry Stone Ql .0 %LDLc SerPl-mCnc 123.0 mg/dLHDLc

 SerPl-mCnc 34.0 mg/dLTrigl SerPl-mCnc 190.0 mg/dLGlucose SerPl-mCnc 78.0 mg/dL

 

                          2009 12:00 AM        Mammogram -Women over 40 Normal HIV1+2 Ab Ser Ql no risk 

 

                          2010 8:47 AM         Dexa Bone Scan Refused Aspirin reccommended Contraindication 



 

                          2010 8:48 AM         Depression Done 

 

                          2010 12:00 AM         Foot Exam-Diabetic Done 

 

                          2010 12:00 AM         Cholest Cry Stone Ql .0 %LDLc SerPl-mCnc 126.0 mg/dLGlucose

 SerPl-mCnc 102.0 mg/dL

 

                          2010 8:45 AM          TRIGLYCERIDES 122.0 mg/dLCHOLESTEROL 186.0 mg/dLHDL 36.0 mg/dLLDL

 (CALC) 126.0 mg/dLGLUCOSE 102.0 mg/dLSODIUM 143.0 mmol/LPOTASSIUM 3.70 
mmol/LCHLORIDE 111.0 mmol/LCO2 23.0 mmol/LBUN 10.0 mg/dLCREATININE 0.80 
mg/dLSGOT/AST 12.0 IU/LSGPT/ALT 11.0 IU/LALK PHOS 65.0 IU/LTOTAL PROTEIN 7.20 
g/dLALBUMIN 3.90 g/dLTOTAL BILI 0.50 mg/dLCALCIUM 10.20 mg/dLeGFR >60 
mL/min/1.73 m2WBC 5.7 RBC 3.26 HGB 10.60 g/dLHCT 31.70 %MCV 97.0 fLMCH 32.50 
pgMCHC 33.40 g/dLRDW CV 13.30 %MPV 9.70 fLPLT 287 %NEUT 62.90 %%LYMP 21.80 
%%MONO 9.90 %%EOS 5.0 %%BASO 0.40 %#NEUT 3.56 #LYMP 1.23 #MONO 0.56 #EOS 0.28 
#BASO 0.02 

 

                          2010 12:00 AM        Glucose SerPl-mCnc 96.0 mg/dLCholest Cry Stone Ql .0 %LDLc

 SerPl-mCnc 146.0 mg/dL

 

                          2010 8:26 AM         TRIGLYCERIDES 106.0 mg/dLCHOLESTEROL 199.0 mg/dLHDL 32.0 mg/dLLDL

 (CALC) 146.0 mg/dLGLUCOSE 96.0 mg/dLSODIUM 143.0 mmol/LPOTASSIUM 4.0 
mmol/LCHLORIDE 113.0 mmol/LCO2 24.0 mmol/LBUN 13.0 mg/dLCREATININE 1.0 
mg/dLSGOT/AST 11.0 IU/LSGPT/ALT 6.0 IU/LALK PHOS 56.0 IU/LTOTAL PROTEIN 6.60 
g/dLALBUMIN 3.80 g/dLTOTAL BILI 0.50 mg/dLCALCIUM 9.30 mg/dLeGFR 57 

 

                          10/6/2010 12:00 AM        Cholest Cry Stone Ql .0 %LDLc SerPl-mCnc 111.0 mg/dLGlucose

 SerPl-mCnc 81.0 mg/dL

 

                          10/6/2010 2:45 PM         TRIGLYCERIDES 123.0 mg/dLCHOLESTEROL 178.0 mg/dLHDL 42.0 mg/dLLDL

 (CALC) 111.0 mg/dLGLUCOSE 81.0 mg/dLSODIUM 139.0 mmol/LPOTASSIUM 4.10 
mmol/LCHLORIDE 106.0 mmol/LCO2 24.0 mmol/LBUN 13.0 mg/dLCREATININE 0.90 
mg/dLSGOT/AST 13.0 IU/LSGPT/ALT 11.0 IU/LALK PHOS 61.0 IU/LTOTAL PROTEIN 7.10 
g/dLALBUMIN 3.90 g/dLTOTAL BILI 0.30 mg/dLCALCIUM 9.30 mg/dLeGFR >60 mL/min/1.73
 m2WBC 6.9 RBC 3.59 HGB 11.50 g/dLHCT 35.30 %MCV 98.0 fLMCH 32.0 pgMCHC 32.60 
g/dLRDW CV 12.90 %MPV 9.90 fLPLT 311 %NEUT 64.90 %%LYMP 22.50 %%MONO 7.20 %%EOS 
5.10 %%BASO 0.30 %#NEUT 4.45 #LYMP 1.54 #MONO 0.49 #EOS 0.35 #BASO 0.02 

 

                          2011 12:00 AM         Mammogram -Women over 40 Ordered 

 

                          2011 10:25 AM        TRIGLYCERIDES 111.0 mg/dLCHOLESTEROL 195.0 mg/dLHDL 43.0 mg/dLLDL

 (CALC) 130.0 mg/dLWBC 5.3 RBC 3.76 HGB 12.0 g/dLHCT 37.80 %.0 fLMCH 
31.90 pgMCHC 31.70 g/dLRDW CV 13.0 %MPV 9.70 fLPLT 259 %NEUT 69.0 %%LYMP 17.60 
%%MONO 8.30 %%EOS 4.70 %%BASO 0.40 %#NEUT 3.63 #LYMP 0.93 #MONO 0.44 #EOS 0.25 
#BASO 0.02 GLUCOSE 102.0 mg/dLSODIUM 146.0 mmol/LPOTASSIUM 4.20 mmol/LCHLORIDE 
113.0 mmol/LCO2 23.0 mmol/LBUN 15.0 mg/dLCREATININE 1.0 mg/dLSGOT/AST 12.0 
IU/LSGPT/ALT 17.0 IU/LALK PHOS 60.0 IU/LTOTAL PROTEIN 6.90 g/dLALBUMIN 4.20 
g/dLTOTAL BILI 0.40 mg/dLCALCIUM 9.70 mg/dLeGFR 57 

 

                          2011 11:49 AM        Cholest Cry Stone Ql .0 %LDLc SerPl-mCnc 130.0 mg/dLHDLc

 SerPl-mCnc 43.0 mg/dLTrigl SerPl-mCnc 111.0 mg/dLGlucose SerPl-mCnc 102.0 mg/dL

 

                          2011 11:52 AM        Pap Smear Declined 

 

                          2011 11:28 AM        Lithium 2.080 mmol/LGLUCOSE 102.0 mg/dLSODIUM 135.0 mmol/LPOTASSIUM

 3.90 mmol/LCHLORIDE 106.0 mmol/LCO2 21.0 mmol/LBUN 12.0 mg/dLCREATININE 1.30 
mg/dLCALCIUM 10.70 mg/dLeGFR 42 

 

                          2011 8:58 AM          Lithium 0.690 mmol/L

 

                          2011 2:38 PM         VITAMIN B12 3483.0 pg/mL

 

                          2013 3:35 PM          WBC 5.1 RBC 3.73 HGB 11.70 g/dLHCT 36.40 %MCV 98.0 fLMCH 31.40

 pgMCHC 32.10 g/dLRDW CV 13.10 %MPV 9.80 fLPLT 224 %NEUT 66.80 %%LYMP 19.10 
%%MONO 9.0 %%EOS 4.90 %%BASO 0.20 %#NEUT 3.42 #LYMP 0.98 #MONO 0.46 #EOS 0.25 
#BASO 0.01 GLUCOSE 88.0 mg/dLSODIUM 141.0 mmol/LPOTASSIUM 4.10 mmol/LCHLORIDE 
110.0 mmol/LCO2 22.0 mmol/LBUN 22.0 mg/dLCREATININE 1.10 mg/dLCALCIUM 9.80 
mg/dLeGFR 50 Lithium 0.760 mmol/L

 

                          2013 11:02 AM        TRIGLYCERIDES 106.0 mg/dLCHOLESTEROL 181.0 mg/dLHDL 46.0 mg/dLLDL

 (CALC) 114.0 mg/dLVITAMIN D 41.10 ng/mL

 

                          10/17/2014 10:10 AM       WBC 5.0 RBC 3.66 HGB 11.60 g/dLHCT 36.80 %.0 fLMCH 31.70

 pgMCHC 31.50 g/dLRDW CV 13.50 %MPV 10.10 fLPLT 209 %NEUT 69.20 %%LYMP 21.0 
%%MONO 6.40 %%EOS 3.20 %%BASO 0.20 %#NEUT 3.46 #LYMP 1.05 #MONO 0.32 #EOS 0.16 
#BASO 0.01 GLUCOSE 100.0 mg/dLSODIUM 148.0 mmol/LPOTASSIUM 3.90 mmol/LCHLORIDE 
114.0 mmol/LCO2 26.0 mmol/LBUN 12.0 mg/dLCREATININE 1.20 mg/dLSGOT/AST 9.0 
IU/LSGPT/ALT <6 IU/LALK PHOS 82.0 IU/LTOTAL PROTEIN 6.90 g/dLALBUMIN 4.0 
g/dLTOTAL BILI 0.40 mg/dLCALCIUM 10.50 mg/dLeGFR 45 TRIGLYCERIDES 96.0 
mg/dLCHOLESTEROL 155.0 mg/dLHDL 38.0 mg/dLLDL (CALC) 98.0 mg/dLLithium 0.850 
mmol/LCancer Antigen (CA) 125 8.30 U/mL

 

                          2015 10:25 AM        Lithium 0.790 mmol/LWBC 4.8 RBC 3.44 HGB 11.0 g/dLHCT 35.20 

%.0 fLMCH 32.0 pgMCHC 31.30 g/dLRDW CV 14.0 %MPV 9.30 fLPLT 210 %NEUT 
70.80 %%LYMP 17.20 %%MONO 8.10 %%EOS 3.50 %%BASO 0.40 %#NEUT 3.41 #LYMP 0.83 
#MONO 0.39 #EOS 0.17 #BASO 0.02 TRIGLYCERIDES 107.0 mg/dLCHOLESTEROL 174.0 
mg/dLHDL 43.0 mg/dLLDL (CALC) 110.0 mg/dLGLUCOSE 90.0 mg/dLSODIUM 145.0 
mmol/LPOTASSIUM 3.80 mmol/LCHLORIDE 115.0 mmol/LCO2 24.0 mmol/LBUN 17.0 mg
/dLCREATININE 1.30 mg/dLSGOT/AST 18.0 IU/LSGPT/ALT 17.0 IU/LALK PHOS 56.0 
IU/LTOTAL PROTEIN 6.70 g/dLALBUMIN 3.90 g/dLTOTAL BILI 0.40 mg/dLCALCIUM 9.80 
mg/dLeGFR 41 

 

                          2015 8:50 AM        WBC 5.8 RBC 3.29 HGB 10.70 g/dLHCT 34.0 %.0 fLMCH 32.50

 pgMCHC 31.50 g/dLRDW CV 13.60 %MPV 9.60 fLPLT 223 %NEUT 69.60 %%LYMP 18.90 
%%MONO 8.50 %%EOS 2.80 %%BASO 0.20 %#NEUT 4.03 #LYMP 1.09 #MONO 0.49 #EOS 0.16 
#BASO 0.01 Lithium 0.620 mmol/LGLUCOSE 83.0 mg/dLSODIUM 139.0 mmol/LPOTASSIUM 
3.90 mmol/LCHLORIDE 109.0 mmol/LCO2 22.0 mmol/LBUN 19.0 mg/dLCREATININE 1.40 
mg/dLSGOT/AST 19.0 IU/LSGPT/ALT 21.0 IU/LALK PHOS 55.0 IU/LTOTAL PROTEIN 6.50 
g/dLALBUMIN 3.90 g/dLTOTAL BILI 0.50 mg/dLCALCIUM 9.60 mg/dLeGFR 38 
TRIGLYCERIDES 121.0 mg/dLCHOLESTEROL 192.0 mg/dLHDL 51.0 mg/dLLDL 121.0 mg/dLTSH
 1.210 uIU/mLHemoglobin A1c 5.40 %

 

                          3/15/2016 8:08 AM         VITAMIN B12 696.0 pg/mL

 

                          3/23/2016 8:26 AM         WBC 7.0 RBC 3.61 HGB 11.80 g/dLHCT 37.70 %.0 fLMCH 32.70

 pgMCHC 31.30 g/dLRDW CV 12.50 %MPV 10.0 fLPLT 207 %NEUT 73.60 %%LYMP 16.40 
%%MONO 6.60 %%EOS 3.0 %%BASO 0.30 %#NEUT 5.15 #LYMP 1.15 #MONO 0.46 #EOS 0.21 
#BASO 0.02 Lithium 0.940 mmol/LGLUCOSE 108.0 mg/dLSODIUM 143.0 mmol/LPOTASSIUM 
4.30 mmol/LCHLORIDE 110.0 mmol/LCO2 27.0 mmol/LBUN 16.0 mg/dLCREATININE 1.60 
mg/dLSGOT/AST 13.0 IU/LSGPT/ALT 7.0 IU/LALK PHOS 71.0 IU/LTOTAL PROTEIN 6.80 
g/dLALBUMIN 4.0 g/dLTOTAL BILI 0.20 mg/dLCALCIUM 10.40 mg/dLeGFR 32 
TRIGLYCERIDES 113.0 mg/dLCHOLESTEROL 169.0 mg/dLHDL 42.0 mg/dLLDL (CALC) 104.0 
mg/dLTSH 2.20 uIU/mLHemoglobin A1c 5.20 %

 

                          3/25/2016 9:17 AM         COLOR YELLOW APPEARANCE CLEAR SPEC GRAV 1.010 pH 7.0 PROTEIN 

NEGATIVE GLUCOSE NEGATIVE mg/dLKETONE NEGATIVE BILIRUBIN NEGATIVE BLOOD NEGATIVE
 NITRITE NEGATIVE LEUK SCREEN SMALL CASTS/LPF NEGATIVE /LPFCRYSTALS NEGATIVE 
MUCOUS THRDS NEGATIVE BACTERIA NEGATIVE EPITH CELLS FEW SQUAMOUS /HPFTRICHOMONAS
 NEGATIVE YEAST NEGATIVE 







History Of Immunizations







       Name    Date Admin    Mfg Name    Mfg Code    Trade Name    Lot#    Route    Inj    Vis Given    Vis

 Pub                                    CVX

 

        Influenza    2008    Not Entered    NE      Not Entered            Not Entered    Not Entered

                    1            999

 

        X       12/19/2008    [employer]    MSD     Pneumovax 23            Intramuscular    Not Entered

                    1            999

 

           Influenza    10/15/2010    [employer]    NOV        Fluvirin > 12 Years    

[id number]     Intramuscular    Left Deltoid    10/15/2010    2009    999

 

          X         3/23/2015    [facility]    [facility]       Prevnar 13    [id number]    Intramuscular

                Right Gluteous Medius    3/23/2015       2013       109







History of Past Illness







                    Name                Date of Onset       Comments

 

                    Peritoneal Neoplasm, Malignant                         

 

                    Hyperlipidemia                           

 

                    Bipolar disorder, unspecified                         

 

                    Artificial opening status; colostomy                         

 

                    B12 deficiency                           

 

                    Anemia, Pernicious                         

 

                    Arthritis unspecified                         

 

                    cervical cancer                          

 

                    Artificial opening status; colostomy    2010  1:10PM     

 

                    Bipolar disorder, unspecified    2010  1:10PM     

 

                    Hyperlipidemia      2010  1:10PM     

 

                    Anemia, Pernicious    2010  1:10PM     

 

                    Postoperative Follow-Up    2010  1:55PM     

 

                    Postoperative Follow-Up    Mar  8 2010 10:57AM     

 

                    Artificial opening status; colostomy    Mar  8 2010  1:19PM     

 

                    Peritoneal Neoplasm, Malignant    Mar  8 2010  1:19PM     

 

                    Artificial opening status; colostomy    2010  1:40PM     

 

                    Hyperlipidemia      2010  1:40PM     

 

                    Anemia, Pernicious    2010  1:40PM     

 

                    Peritoneal Neoplasm, Malignant    2010  1:40PM     

 

                    Arthritis unspecified    2010  1:40PM     

 

                    Anemia of Chronic Illness    2010  1:40PM     

 

                    Tinea corporis      2010  3:17PM     

 

                    Bipolar disorder, unspecified    2010  1:33PM     

 

                    Hyperlipidemia      2010  1:33PM     

 

                    Anemia, Pernicious    2010  1:33PM     

 

                    Peritoneal Neoplasm, Malignant    2010  1:33PM     

 

                    B12 deficiency      2010  1:33PM     

 

                    Ethmoidal Sinusitis, Acute    Sep 21 2010  3:53PM     

 

                    Wheezing            Sep 21 2010  3:53PM     

 

                    Flu                 Oct 15 2010  1:40PM     

 

                    Bipolar disorder, unspecified    Oct 15 2010  1:42PM     

 

                    Hyperlipidemia      Oct 15 2010  1:42PM     

 

                    Anemia, Pernicious    Oct 15 2010  1:42PM     

 

                    Peritoneal Neoplasm, Malignant    Oct 15 2010  1:42PM     

 

                    Bipolar disorder, unspecified    2011 12:01PM     

 

                    Hyperlipidemia      2011 12:01PM     

 

                    Anemia, Pernicious    2011 12:01PM     

 

                    Peritoneal Neoplasm, Malignant    2011 12:01PM     

 

                    Bipolar disorder, unspecified    Apr 15 2011 10:55AM     

 

                    Major Depression    2011 10:11AM     

 

                    Bipolar Disorder    2011 10:11AM     

 

                    Cancer              May 10 2011  4:16PM     

 

                    Major Depression    May 10 2011  3:16PM     

 

                    Bipolar Disorder    May 10 2011  3:16PM     

 

                    Hypercalcemia       May 23 2011  2:47PM     

 

                    Bipolar disorder, unspecified    May 23 2011  2:47PM     

 

                    Colon Cancer, Personal History    May 23 2011  2:47PM     

 

                    Bipolar Disorder    May 31 2011  4:39PM     

 

                    Depressive Disorder    2011 10:01AM     

 

                    Vitamin B12 deficiency    2011 10:01AM     

 

                    Vitamin D Deficiency    2011  5:07PM     

 

                    Anemia, Vitamin B12 Deficiency    2011  5:07PM     

 

                    B12 deficiency      2011  3:56PM     

 

                    Routine gynecological examination    Aug  4 2011  9:08AM     

 

                    Screening Examination for Breast Cancer    Aug  4 2011  9:08AM     

 

                    Tinea Corporis      Aug  4 2011  9:08AM     

 

                    Depressive Disorder    Sep 23 2011  8:47AM     

 

                    Contact Dermatitis    Sep 23 2011  8:47AM     

 

                    Anemia, Pernicious    Sep 23 2011  8:47AM     

 

                    B12 deficiency      Sep 23 2011  8:47AM     

 

                    B12 deficiency      Sep 27 2011  2:58PM     

 

                    B12 deficiency      Oct 20 2011  2:34PM     

 

                    Flu                 Dec  9 2011  3:16PM     

 

                    B12 deficiency      Dec  9 2011  3:17PM     

 

                    B12 deficiency      2012  4:52PM     

 

                    B12 deficiency      2012 11:10AM     

 

                    B12 deficiency      2012  3:37PM     

 

                    B12 deficiency      May  3 2012  4:10PM     

 

                    B12 deficiency      2012  2:54PM     

 

                    B12 deficiency      2012 11:23AM     

 

                    B12 deficiency      Aug  9 2012  2:08PM     

 

                    B12 deficiency      Sep  6 2012  4:36PM     

 

                    B12 deficiency      Oct 16 2012 10:23AM     

 

                    Flu                 2013  3:11PM     

 

                    Bipolar disorder, unspecified    b  2013  2:48PM     

 

                    Anemia, Pernicious    b  2013  2:48PM     

 

                    B12 deficiency      Feb  2013  2:48PM     

 

                    Extrapyramidal abnormal movement disorder    Feb  2013  2:48PM     

 

                    B12 deficiency      Apr  3 2013 12:03PM     

 

                    Bipolar disorder, unspecified    May  7 2013  1:31PM     

 

                    Anemia, Pernicious    May  7 2013  1:31PM     

 

                    B12 deficiency      May  7 2013  1:31PM     

 

                    Extrapyramidal abnormal movement disorder    May  7 2013  1:31PM     

 

                    B12 deficiency      2013  3:42PM     

 

                    B12 deficiency      2013  1:31PM     

 

                    Hyperlipidemia      Aug  7 2013 10:37AM     

 

                    Vitamin D Deficiency    Aug  7 2013 10:37AM     

 

                    Bipolar disorder, unspecified    Aug  7 2013 10:37AM     

 

                    Anemia, Pernicious    Aug  7 2013 10:37AM     

 

                    B12 deficiency      Aug  7 2013 10:37AM     

 

                    B12 deficiency      Sep 25 2013 11:15AM     

 

                    B12 deficiency      Dec 11 2013  3:16PM     

 

                    B12 deficiency      Mar  6 2014  1:48PM     

 

                    B12 deficiency      May 21 2014  3:17PM     

 

                    Screening Examination for Breast Cancer    2014  3:23PM     

 

                    Periumbilical abdominal pain    2014  3:23PM     

 

                    B12 deficiency      Jul 10 2014  2:52PM     

 

                    Anemia, Vitamin B12 Deficiency    Aug 13 2014  4:50PM     

 

                    Bipolar disorder    Oct 16 2014 11:13AM     

 

                    Hyperlipidemia      Oct 16 2014 11:13AM     

 

                    Anemia, Pernicious    Oct 16 2014 11:13AM     

 

                    Peritoneal Neoplasm, Malignant    Oct 16 2014 11:13AM     

 

                    Screening breast examination    Oct 16 2014 11:13AM     

 

                    Weight loss         Oct 16 2014 11:13AM     

 

                    Anemia, Pernicious    Mar 23 2015  2:57PM     

 

                    B12 deficiency      Mar 23 2015  2:57PM     

 

                    Need for Prevnar vaccine    Mar 23 2015  2:57PM     

 

                    Bipolar disorder    Mar 23 2015  2:57PM     

 

                    Hyperlipidemia      Mar 23 2015  2:57PM     

 

                    Anemia, Pernicious    Mar 23 2015  2:57PM     

 

                    Peritoneal Neoplasm, Malignant    Mar 23 2015  2:57PM     

 

                    B12 deficiency      May  4 2015  4:48PM     

 

                    Hyperlipidemia      May 13 2015  9:56AM     

 

                    Anemia              May 13 2015  9:56AM     

 

                    Bipolar disorder    May 13 2015  9:56AM     

 

                    Bipolar disorder    May 14 2015  3:27PM     

 

                    Hyperlipidemia      May 14 2015  3:27PM     

 

                    Anemia, Pernicious    May 14 2015  3:27PM     

 

                    Peritoneal Neoplasm, Malignant    May 14 2015  3:27PM     

 

                    B12 deficiency      2015  2:20PM     

 

                    B12 deficiency      2015 11:34AM     

 

                    B12 deficiency      Aug 18 2015  9:06AM     

 

                    Tinea Corporis      Sep 18 2015  8:54AM     

 

                    B12 deficiency      Sep 18 2015  8:54AM     

 

                    B12 deficiency      2015 10:28AM     

 

                    Herpes zoster without complication    Dec  3 2015  9:52AM     

 

                    B12 deficiency      Dec 23 2015 11:21AM     

 

                    B12 deficiency      2016  4:51PM     

 

                    Vitamin B 12 deficiency    Mar 14 2016  5:35PM     

 

                    B12 deficiency      Mar 15 2016 12:14PM     

 

                    B12 deficiency      May  5 2016 11:30AM     

 

                    Edema               May  5 2016 11:30AM     

 

                    Dermatitis          May  5 2016 11:30AM     

 

                    Edema               May 17 2016  8:38AM     

 

                    Shortness of breath    May 17 2016  8:38AM     

 

                    Bilateral edema of lower extremity    2016  2:06PM     

 

                    B12 deficiency      2016  2:06PM     

 

                    B12 deficiency      2016 11:50AM     

 

                    B12 deficiency      2016 11:20AM     

 

                    Diarrhea            Aug  2 2016  3:13PM     

 

                    B12 deficiency      Aug 24 2016 11:10AM     







Payers







           Insurance Name    Company Name    Plan Name    Plan Number    Policy Number    Policy Group

 Number                                 Start Date

 

                    Medicare Part A    Medicare [facility]              [id number]              N/A

 

                          [employer]    [employer]                 [id number]

                                                    

 

                    Medicare Part A    Medicare - Lab/Xray              [id number]              2006

 

                    Medicare Part B    [employer] [place]              [id number]              2006

 

                          [employer]    [employer]                 50 percent

                                                    2009

 

                    [facility]    [id number]              [id number]              N/A

 

                    Medicare Part A    Medicare Part A              [id number]              N/A

 

                    Medicare Part A    Medicare Part A              [id number]              2006









History of Encounters







                    Visit Date          Visit Type          Provider

 

                    2016           Nurse visit         Bhupinder Louise DO

 

                    2016            Office visit        Bhupinder Louise DO

 

                    2016           Nurse visit         Bhupinder Louise DO

 

                    2016           Office visit        Bret GREEN

 

                    2016            Office visit        Bhupinder Louise DO

 

                    3/15/2016           Nurse visit         Bhupinder Louise DO

 

                    2016            Nurse visit         Bhupinder Readhite DO

 

                    2015          Nurse visit         Bhupinder Aspen DO

 

                    12/3/2015           Office visit        Bhupinder Aspen DO

 

                    2015          Nurse visit         Bhupinder Aspen DO

 

                    2015           Office visit        Bhupinder Aspen DO

 

                    2015           Nurse visit         Bhupinder Aspen DO

 

                    2015            Nurse visit         Bhupinder Aspen DO

 

                    2015            Nurse visit         Bhupinder Aspen DO

 

                    2015           Office visit        Bhupinder Aspen DO

 

                    2015            Nurse visit         Bhupinder Aspen DO

 

                    3/23/2015           Office visit        Bhupinder Aspen DO

 

                    10/16/2014          Office visit        Bhupinder Aspen DO

 

                    2014           Nurse visit         Radha GREEN

 

                    7/10/2014           Nurse visit         Bhupinder Aspen DO

 

                    2014           Office visit        Bhupinder Aspen DO

 

                    2014           Nurse visit         Bhupinder Aspen DO

 

                    3/6/2014            Nurse visit         Bhupinder Aspen DO

 

                    2014            Yasmine Lopez MD

 

                    2013          Nurse visit         Bhupinder Aspen DO

 

                    2013           Nurse visit         Bhupinder Aspen DO

 

                    2013            Office visit        Bhupinder Aspen DO

 

                    2013            Nurse visit         Bhupinder Aspen DO

 

                    2013            Nurse visit         Bhupinder Aspen DO

 

                    2013            Office visit        Bhupinder Aspen DO

 

                    4/3/2013            Nurse visit         Bhupinder Aspen DO

 

                    2013            Office visit        Bhupinder Aspen DO

 

                    10/16/2012          Nurse visit         Bhupinder Aspen DO

 

                    2012            Nurse visit         Bhupinder Aspen DO

 

                    2012            Voided              Bhupinder Aspen DO

 

                    2012            Nurse visit         Bhupinder Aspen DO

 

                    2012            Nurse visit         Bhupinder Aspen DO

 

                    2012           Nurse visit         Bhupinder Aspen DO

 

                    5/3/2012            Nurse visit         Bhupinder Aspen DO

 

                    2012           Nurse visit         Bhupinder Aspen DO

 

                    2012           Nurse visit         Bhupinder Aspen DO

 

                    2012           Nurse visit         Bhupinder Aspen DO

 

                    2011           Nurse visit         Bhupinder Aspen DO

 

                    10/20/2011          Nurse visit         Bhupinder Aspen DO

 

                    2011           Office visit        Bhupinder Aspen DO

 

                    2011           Nurse visit         Radha GREEN

 

                    2011            Office visit        Bhupinder Aspen DO

 

                    2011           Nurse visit         Bhupinder Aspen DO

 

                    2011            Office visit        Bhupinder Aspen DO

 

                    2011           Office visit        Bhupinder Aspen DO

 

                    5/10/2011           Office visit        Bhupinder Aspen DO

 

                    2011           Office visit        Bhupinder Aspen DO

 

                    4/15/2011           Office visit        Devin Angel DO

 

                    2011           Office visit        Devin Angel DO

 

                    10/15/2010          Office visit        Devin Angel DO

 

                    2010           Office visit        Devin Angel DO

 

                    2010            Office visit        Devin Angel DO

 

                    2010           Office visit        Devin Angel DO

 

                    2010            Office visit        Devin Angel DO

 

                    3/8/2010            Office visit        Devin Masterson MD

 

                    2010            Surgery             Devin Masterson MD

 

                    2010            Office visit        Devin Angel DO

 

                    2010           Surgery             Devin Masterson MD

 

                    2010           Hospital            Devin Masterson MD

 

                    2010           [facility]            Devin Masterson MD

 

                    10/22/2009          Office visit        Deivn Angel DO

## 2019-06-26 NOTE — XMS REPORT
MU2 Ambulatory Summary

                             Created on: 2017



Pauline Gan

External Reference #: [id number]

: 1950

Sex: Female



Demographics







                          Address                   [address]

[place]  [address]

 

                          Home Phone                [phone]

 

                          Preferred Language        English

 

                          Marital Status            Legally 

 

                          Protestant Affiliation     Unknown

 

                          Race                      White

 

                          Ethnic Group              Not  or 





Author







                          Author                    Radha Ontiveros

 

                          [facility]

 

                          Address                   [address]

[place], [place]  [id number]



 

                          Phone                     [phone]







Care Team Providers







                    Care Team Member Name    Role                Phone

 

                    Radha Ontiveros       PCP                 Unavailable

 

                    Bhupinder Louise    PreferredProvider    Unavailable







Allergies and Adverse Reactions







                    Name                Reaction            Notes

 

                    NO KNOWN DRUG ALLERGIES                         







Plan of Treatment







             Planned Activity    Comments     Planned Date    Planned Time    Plan/Goal

 

             Injection, Subcutaneous/IM                 2016    12:00 AM      

 

             Injection, Subcutaneous/IM                 2016    12:00 AM      

 

             Mammography; bilateral                 2016    12:00 AM      

 

             Injection, Subcutaneous/IM                 2016    12:00 AM      

 

             CBC with Auto                 2013     12:00 AM      

 

             Lithium                   2013     12:00 AM      

 

             Basic metabolic panel                 2013     12:00 AM      

 

             Vitamin B12 (cyanocobalamin)                 2013     12:00 AM      

 

             Folic acid, serum                 2013     12:00 AM      

 

             Injection,Subcutaneous/Intramuscul                 2013    12:00 AM      







Medications







                                        Active 

 

             Name         Start Date    Estimated Completion Date    SIG          Comments

 

                Latuda 20 mg oral tablet                                    take 1 tablet (20 mg) by oral route once daily with

 food (at least 350 calories)            

 

             pravastatin 40 mg oral tablet    3/30/2015                 TAKE 1 TABLET BY MOUTH DAILY     

 

                Namenda XR 28 mg oral capsule,sprinkle,ER 24hr    2015                       take 1 capsule (28

 mg) by oral route once daily            

 

                Namenda XR 28 mg oral capsule,sprinkle,ER 24hr    2016                       take 1 capsule (28

 mg) by oral route once daily            

 

                triamcinolone acetonide 0.1 % topical cream    2016                        apply a thin layer to 

the affected area(s) by topical route 2 times per day     

 

                potassium chloride 10 mEq oral tablet extended release    2016                       take 1 tablet

 (10 meq) by oral route once daily       

 

             pravastatin 40 mg oral tablet    2016                 TAKE 1 TABLET BY MOUTH DAILY     

 

                Vitamin B-12 1,000 mcg/mL injection solution    2016                       inject 1 milliliter 

(1,000 mcg) by intramuscular route once a month     

 

                potassium chloride 10 mEq oral tablet extended release    2016                      take 1 tablet

 (10 meq) by oral route once daily       

 

                Namenda XR 28 mg oral capsule,sprinkle,ER 24hr    2016                      TAKE 1 CAPSULE BY

 MOUTH EVERY DAY                         

 

                furosemide 40 mg oral tablet    2016                      take 1 tablet (40 mg) by oral route

 once daily                              

 

                metoclopramide HCl 10 mg oral tablet    2017       take 1 tablet by oral

 route 2 times a day for 50 days         









                                         

 

             Name         Start Date    Expiration Date    SIG          Comments

 

             Reglan 10mg    3/29/2010    2010    one ac and hs     

 

                Keflex 500 mg oral capsule    2010       10/1/2010       take 1 capsule (500 mg) by oral

 route every 6 hours for 10 days         

 

                Bactrim -160 mg oral tablet    2011       take 1 tablet by oral route

 every 12 hours for 7 days               

 

                triamcinolone acetonide 0.1 % topical cream    2011      apply a thin

 layer to the affected area(s) by topical route 2 times per day     

 

                sertraline 100 mg oral tablet    4/10/2012       5/10/2012       take 1.5 tablets by oral route

 daily for 30 days                       

 

                ergocalciferol (vitamin D2) 50,000 unit oral capsule    4/15/2013       2013       TAKE

 ONE CAPSULE BY MOUTH ONCE A WEEK        

 

                CYANOCOBALAM 1000MCGINJ 1000 milliliter    2013       INJECT 1ML INTRAMUSCULAR

 ONCE A MONTH                            

 

                pravastatin 40 mg oral tablet    3/25/2014       3/20/2015       TAKE ONE TABLET BY MOUTH EVERY

 DAY                                     

 

                          Zostavax (PF) 19,400 unit/0.65 mL subcutaneous suspension for reconstitution    3/23/2015

                    3/24/2015           inject 0.65 milliliter by subcutaneous route once     

 

                famciclovir 500 mg oral tablet    12/3/2015       12/10/2015      take 1 tablet (500 mg) by

 oral route every 8 hours for 7 days     

 

                furosemide 40 mg oral tablet    2016      take 1 tablet (40 mg) by oral

 route once daily                        

 

                Cipro 500 mg oral tablet    2016       take 1 tablet (500 mg) by oral route

 2 times per day for 5 days              

 

                Bactrim -160 mg oral tablet    2016        take 1 tablet by oral route

 every 12 hours for 7 days               

 

                Macrobid 100 mg oral capsule    2017       take 1 capsule (100 mg) by oral

 route 2 times per day with food for 7 days     

 

                Augmentin 875-125 mg oral tablet    2017       take 1 tablet by oral route

 every 12 hours for 7 days               









                                        Discontinued 

 

             Name         Start Date    Discontinued Date    SIG          Comments

 

                Tylenol 325 mg oral tablet                    2013        take 1 - 2 tablets (325 -650 mg) by oral

 route every 4-6 hours as needed         

 

                Calcium 600 + D(3) 600 mg(1,500mg) -400 unit oral tablet                    2011       take 1 tablet

 by oral route 2 times a day            no longer taking

 

                Vitamin B-12 1,000 mcg oral tablet extended release    2010       take 1

 tablet by oral route daily             no longer taking

 

                Antifungal (clotrimazole) 1 % topical cream    2010       apply to the 

affected and surrounding areas of skin by topical route 2 times per day morning 
and evening                              

 

                sertraline 100 mg oral tablet    5/10/2011       2011       take 2 tablets (200 mg) by 

oral route once daily                   discontinued by Dr. Serrano

 

                mirtazapine 15 mg oral tablet                    2011        take 1 tablet (15 mg) by oral route 

once daily before bedtime               Dr. Serrano

 

                mirtazapine 15 mg oral tablet                    2011        take 1 tablet (15 mg) by oral route 

once daily before bedtime               dc'd by Dr. Serrano

 

                Pristiq 50 mg oral tablet extended release 24 hr                    2013        take 1 tablet (50

 mg) by oral route once daily           Dr. Serrano

 

                Pristiq 50 mg oral tablet extended release 24 hr                    2013        take 1 tablet (50

 mg) by oral route once daily           dose updated

 

                Vitamin B-12 1,000 mcg/mL injection solution    2011        inject 1 milliliter

 (1,000 mcg) by intramuscular route once a month    on list already

 

                    syringe with needle 1 mL 25 gauge x 1" miscellaneous syringe    2011

                          use for injection once a month     

 

                clotrimazole 1 % topical cream    2011        apply to the affected and surrounding

 areas of skin by topical route 2 times per day in the morning and evening     

 

                Vitamin D2 50,000 unit oral capsule    2011        take 1 capsule (50,000

 unit) by oral route once weekly        generic on list

 

                Pravachol 40 mg oral tablet    2012        take 1 tablet (40 mg) by oral 

route once daily for 90 days            generic on list

 

                lithium carbonate 300 mg oral capsule    2012        take 1 capsule by oral

 route daily                            dose updated

 

                Pristiq 100 mg oral tablet extended release 24 hr                    4/10/2012       take 1 and 1/2 

tablet (150 mg) by oral route once daily    Mental Health provider

 

                Pristiq 100 mg oral tablet extended release 24 hr                    4/10/2012       take 1 and 1/2 

tablet (150 mg) by oral route once daily    Discontinued by Dr Efrain Knight at [facility]

 

                hydroxyzine HCl 50 mg oral tablet    10/16/2014      2015       take 1 tablet (50 mg) 

by oral route at bedtime                 

 

                lithium carbonate 300 mg oral capsule    2015       take 1 capsule (300

 mg) by oral route 2 for 30 days         

 

                fluconazole 100 mg oral tablet    2015       12/3/2015       take 1 tablet (100 mg) by 

oral route once a week                   

 

                ketoconazole 2 % topical cream    2015       12/3/2015       apply to the affected area(s)

 by topical route 2 times per day        

 

                prednisone 10 mg oral tablet    12/3/2015       2016        take 2 tablets (20 mg) by oral

 route once daily for 4 days 1 tablet daily for 4 days 0.5 tablet daily for 4 
days                                     

 

                Cipro 500 mg oral tablet    1/15/2017       2017       take 1 tablet (500 mg) by oral route

 every 12 hours for 10 days              







Problem List







                    Description         Status              Onset

 

                    Artificial opening status; colostomy    Active               

 

                    Bipolar disorder, unspecified    Active               

 

                    Hyperlipidemia      Active               

 

                    Peritoneal Neoplasm, Malignant    Active               

 

                    Anemia, Pernicious    Active               

 

                    Arthritis unspecified    Active               

 

                    B12 deficiency      Active               







Vital Signs







      Date    Time    BP-Sys(mm[Hg]    BP-Lynn(mm[Hg])    HR(bpm)    RR(rpm)    Temp    WT    HT    HC    BMI

                    BSA                 BMI Percentile      O2 Sat(%)

 

        2017    11:07:00 AM    124 mmHg    64 mmHg    62 bpm    17 rpm    98.2 F    181.2 lbs    69

 in                       26.76 kg/m2    2.00 m2                   98 %

 

        1/15/2017    3:34:00 PM    148 mmHg    89 mmHg    69 bpm    20 rpm    98.2 F    179 lbs    69 in

                          26.4334 kg/m    1.9882 m                 98 %

 

       2017    1:51:00 PM    160 mmHg    90 mmHg    100 bpm    20 rpm    96.5 F    179 lbs             

                                                                98 %

 

       2016    3:11:00 PM    134 mmHg    76 mmHg    80 bpm    20 rpm    98 F    163 lbs    69 in     

                24.0706 kg/m    1.8972 m                      98 %

 

        2016    2:04:00 PM    142 mmHg    86 mmHg    68 bpm    16 rpm    98.5 F    166 lbs    63 in

                          29.41 kg/m2    1.83 m2                   100 %

 

        2016    11:27:00 AM    148 mmHg    78 mmHg    90 bpm    20 rpm    98.2 F    153 lbs    69 in

                          22.5939 kg/m    1.8381 m                 96 %

 

        12/3/2015    9:50:00 AM    132 mmHg    70 mmHg    62 bpm    16 rpm    97.9 F    145 lbs    69 in

                          21.41 kg/m2    1.79 m2                   100 %

 

        2015    8:52:00 AM    132 mmHg    68 mmHg    52 bpm    20 rpm    97.8 F    141 lbs    69 in

                          20.8218 kg/m    1.7645 m                 100 %

 

        2015    3:25:00 PM    120 mmHg    62 mmHg    72 bpm    16 rpm    98.1 F    136 lbs    69 in

                          20.08 kg/m2    1.73 m2                   98 %

 

       3/23/2015    2:55:00 PM    130 mmHg    76 mmHg    68 bpm    18 rpm    97 F    140 lbs    69 in    

                20.6742 kg/m    1.7583 m                      98 %

 

        10/16/2014    11:11:00 AM    120 mmHg    66 mmHg    77 bpm    20 rpm    98 F    130 lbs    69 in

                          19.20 kg/m2    1.69 m2                   100 %

 

        2014    3:21:00 PM    130 mmHg    66 mmHg    63 bpm    18 rpm    97.2 F    160 lbs    69 in

                          23.6276 kg/m    1.8797 m                 99 %

 

        2013    10:35:00 AM    132 mmHg    70 mmHg    66 bpm    20 rpm    98.1 F    157 lbs    69 in

                          23.18 kg/m2    1.86 m2                    

 

        2013    1:29:00 PM    132 mmHg    70 mmHg    76 bpm    18 rpm    98.2 F    166 lbs    69 in 

                          24.5137 kg/m    1.9146 m                  

 

       2013    2:46:00 PM    128 mmHg    70 mmHg    76 bpm    16 rpm    98 F    160 lbs    69 in     

                23.63 kg/m2     1.88 m2                          

 

        2011    8:49:00 AM    128 mmHg    78 mmHg    70 bpm    18 rpm    97.9 F    164 lbs    69 in

                          24.2183 kg/m    1.903 m                  

 

     2011    1:31:00 PM    132 mmHg    68 mmHg    84 bpm         97 F    167 lbs                        

                                         

 

        2011    9:09:00 AM    128 mmHg    70 mmHg    72 bpm    18 rpm    98.2 F    163 lbs    64 in 

                          27.9786 kg/m    1.8272 m                  

 

       2011    10:01:00 AM    132 mmHg    70 mmHg    72 bpm    18 rpm    98.2 F    154 lbs             

                                                                 

 

       2011    2:47:00 PM    128 mmHg    70 mmHg    72 bpm    18 rpm    97.8 F    156 lbs             

                                                                 

 

       5/10/2011    3:16:00 PM    144 mmHg    80 mmHg    72 bpm    18 rpm    98.2 F    158 lbs             

                                                                 

 

        2011    10:11:00 AM    132 mmHg    70 mmHg    70 bpm    18 rpm    98.2 F    168 lbs    69 in

                          24.809 kg/m    1.9261 m                  

 

        4/15/2011    10:52:00 AM    110 mmHg    60 mmHg    75 bpm    16 rpm    97.5 F    172.375 lbs    

69 in                     25.46 kg/m2    1.95 m2                   100 %

 

        2011    11:43:00 AM    120 mmHg    82 mmHg    75 bpm    16 rpm    97.2 F    178.5 lbs    69

 in                       26.3596 kg/m    1.9854 m                 100 %

 

        10/15/2010    1:32:00 PM    120 mmHg    70 mmHg    80 bpm    18 rpm    96.6 F    177 lbs    69 in

                          26.14 kg/m2    1.98 m2                   100 %

 

        2010    3:50:00 PM    168 mmHg    100 mmHg    82 bpm    18 rpm    97.8 F    177.5 lbs    69

 in                       26.2119 kg/m    1.9798 m                 97 %

 

        2010    1:21:00 PM    140 mmHg    80 mmHg    59 bpm    16 rpm    97.6 F    173.25 lbs    69 

in                        25.58 kg/m2    1.96 m2                   100 %

 

        2010    3:02:00 PM    140 mmHg    80 mmHg    61 bpm    16 rpm    97.6 F    173.125 lbs    69

 in                       25.5658 kg/m    1.9553 m                 99 %

 

        2010    1:23:00 PM    130 mmHg    80 mmHg    66 bpm    16 rpm    96.8 F    173 lbs    69 in 

                          25.55 kg/m2    1.95 m2                   100 %

 

        2010    12:58:00 PM    130 mmHg    88 mmHg    75 bpm    16 rpm    98.4 F    172.25 lbs    69

 in                       25.4366 kg/m    1.9503 m                 100 %







Social History







                    Name                Description         Comments

 

                    denies alcohol use                         

 

                    denies smoking                           

 

                    Denies illicit substance abuse                         

 

                    retired                                 direct care

 

                    Single                                   

 

                    Exercises regularly                         

 

                    Attended some college                         

 

                    Tobacco             Never smoker         







History of Procedures







                    Date Ordered        Description         Order Status

 

                    2010 12:00 AM    COMPREHEN METABOLIC PANEL    Reviewed

 

                    2010 12:00 AM    COMPLETE CBC W/AUTO DIFF WBC    Reviewed

 

                    2010 12:00 AM    LIPID PANEL         Reviewed

 

                          2015 12:00 AM        B12 Injection, Up to 1000 Mcg NDC#7108-9855-03 [facility] Medicare 

                                        Reviewed

 

                    2011 12:00 AM    MAMMOGRAM SCREENING    Reviewed

 

                    2011 12:00 AM    CYTOPATH C/V THIN LAYER    Reviewed

 

                    2011 12:00 AM    B12 Injection 1 cc NDC#[address]-20    Reviewed

 

                    2015 12:00 AM    THER/PROPH/DIAG INJ SC/IM    Reviewed

 

                    2015 12:00 AM    B12 Injection, Up to 1000 Mcg NDC#6665-5194-46    Reviewed

 

                    2011 12:00 AM    THER/PROPH/DIAG INJ SC/IM    Reviewed

 

                    2011 12:00 AM    B12 Injection([place]) Ndc#9579-1064-70-    Reviewed

 

                    2015 12:00 AM    THER/PROPH/DIAG INJ SC/IM    Reviewed

 

                    2015 12:00 AM    B12 Injection, Up to 1000 Mcg NDC#1474-1717-28    Reviewed

 

                    10/20/2011 12:00 AM    THER/PROPH/DIAG INJ SC/IM    Reviewed

 

                    10/20/2011 12:00 AM    B12 Injection(Arabella) Ndc#5848-7311-38-    Reviewed

 

                    2016 12:00 AM    THER/PROPH/DIAG INJ SC/IM    Reviewed

 

                    2016 12:00 AM    B12 Injection, Up to 1000 Mcg NDC#5571-5630-36    Reviewed

 

                    3/14/2016 12:00 AM    VITAMIN B-12        Reviewed

 

                    3/15/2016 12:00 AM    THER/PROPH/DIAG INJ SC/IM    Reviewed

 

                    3/15/2016 12:00 AM    B12 Injection, Up to 1000 Mcg NDC#7201-7761-03    Reviewed

 

                    2011 12:00 AM    ***Immunization administration, Medicare flu    Reviewed

 

                    2011 12:00 AM    Fluzone ** MEDICARE Only **    Reviewed

 

                    2011 12:00 AM    THER/PROPH/DIAG INJ SC/IM    Reviewed

 

                    2011 12:00 AM    B12 Injection ([employer] [facility]) Ndc#2920-8788-74    Reviewed

 

                    2016 12:00 AM    B12 Injection, Up to 1000 Mcg NDC#7688-6383-55 [facility] Medicare    

Reviewed

 

                    2016 12:00 AM    TTE W/DOPPLER COMPLETE    Reviewed

 

                    2016 12:00 AM    EXTREMITY STUDY     Returned

 

                          2016 12:00 AM        B12 Injection, Up to 1000 Mcg NDC#6962-1928-62 [facility] Medicare 

                                        Reviewed

 

                    2016 12:00 AM    THER/PROPH/DIAG INJ SC/IM    Reviewed

 

                    2012 12:00 AM    THER/PROPH/DIAG INJ SC/IM    Reviewed

 

                    2012 12:00 AM    B12 Injection ([employer]) Ndc#1707-6428-54    Reviewed

 

                    2016 12:00 AM    THER/PROPH/DIAG INJ SC/IM    Reviewed

 

                    2016 12:00 AM    B12 Injection, Up to 1000 Mcg NDC#1693-5790-54    Reviewed

 

                    2012 12:00 AM    THER/PROPH/DIAG INJ SC/IM    Reviewed

 

                    2012 12:00 AM    B12 Injection([place]) Ndc#2114-5306-22-    Reviewed

 

                    12/15/2016 12:00 AM    B12 Injection, Up to 1000 Mcg NDC#0718-5279-31    Reviewed

 

                    12/15/2016 12:00 AM    THER/PROPH/DIAG INJ SC/IM    Reviewed

 

                    2016 12:00 AM    URNLS DIP STICK/TABLET RGNT AUTO W/O MICROSCOPY    Returned

 

                    1/3/2017 12:00 AM    URNLS DIP STICK/TABLET RGNT AUTO W/O MICROSCOPY    Returned

 

                    2017 12:00 AM    URINE CULTURE/COLONY COUNT    Returned

 

                    2017 12:00 AM    Rocephin 1 gram Injection, [facility] Medicare    Reviewed

 

                    2017 12:00 AM    THERAPEUTIC PROPHYLACTIC/DX INJECTION SUBQ/IM    Reviewed

 

                    2017 12:00 AM    B12 1000mcg Injection, [facility] Medicare    Reviewed

 

                    5/3/2012 12:00 AM    THER/PROPH/DIAG INJ SC/IM    Reviewed

 

                    5/3/2012 12:00 AM    B12 Injection([place]) Ndc#3710-4621-75-    Reviewed

 

                    2012 12:00 AM    IMMUNOTHERAPY INJECTIONS    Reviewed

 

                    2012 12:00 AM    B12 Injection([place]) Ndc#9439-1476-31-    Reviewed

 

                    2012 12:00 AM    THER/PROPH/DIAG INJ SC/IM    Reviewed

 

                    2012 12:00 AM    B12 Injection, Up to 1000 Mcg NDC#2969-7886-94    Reviewed

 

                    2012 12:00 AM    THER/PROPH/DIAG INJ SC/IM    Reviewed

 

                    2012 12:00 AM    B12 Injection, Up to 1000 Mcg NDC#8397-3792-01    Reviewed

 

                    2012 12:00 AM    THER/PROPH/DIAG INJ SC/IM    Reviewed

 

                    2012 12:00 AM    B12 Injection, Up to 1000 Mcg NDC#6797-8570-44    Reviewed

 

                    10/16/2012 12:00 AM    THER/PROPH/DIAG INJ SC/IM    Reviewed

 

                    10/16/2012 12:00 AM    B12 Injection, Up to 1000 Mcg NDC#9843-7375-81    Reviewed

 

                    2010 12:00 AM    COMPREHEN METABOLIC PANEL    Reviewed

 

                    2010 12:00 AM    COMPLETE CBC W/AUTO DIFF WBC    Reviewed

 

                    2010 12:00 AM    LIPID PANEL         Reviewed

 

                    2013 12:00 AM    Flu Injection 3 Years And Above NDC# [address]-26  [facility]    Reviewed



 

                    2013 12:00 AM    COMPLETE CBC W/AUTO DIFF WBC    Reviewed

 

                    2013 12:00 AM    ASSAY OF LITHIUM    Reviewed

 

                    2013 12:00 AM    METABOLIC PANEL TOTAL CA    Reviewed

 

                    4/3/2013 12:00 AM    THER/PROPH/DIAG INJ SC/IM    Reviewed

 

                    4/3/2013 12:00 AM    B12 Injection, Up to 1000 Mcg NDC#7450-6473-27    Reviewed

 

                    2013 12:00 AM    THER/PROPH/DIAG INJ SC/IM    Reviewed

 

                    2013 12:00 AM    B12 Injection, Up to 1000 Mcg NDC#8933-5345-20    Reviewed

 

                    2013 12:00 AM    THER/PROPH/DIAG INJ SC/IM    Reviewed

 

                    2013 12:00 AM    B12 Injection, Up to 1000 Mcg NDC#6889-0114-74    Reviewed

 

                    2013 12:00 AM    LIPID PANEL         Reviewed

 

                    2013 12:00 AM    VITAMIN D 25 HYDROXY    Reviewed

 

                    2013 12:00 AM    THER/PROPH/DIAG INJ SC/IM    Reviewed

 

                    3/6/2014 12:00 AM    THER/PROPH/DIAG INJ SC/IM    Reviewed

 

                    2014 12:00 AM    THER/PROPH/DIAG INJ SC/IM    Reviewed

 

                    2014 12:00 AM    B12 Injection, Up to 1000 Mcg NDC#7359-5240-88    Reviewed

 

                    2010 12:00 AM    SKIN FUNGI CULTURE    Reviewed

 

                    10/9/2010 12:00 AM    COMPREHEN METABOLIC PANEL    Reviewed

 

                    10/9/2010 12:00 AM    LIPID PANEL         Reviewed

 

                    2010 12:00 AM    THER/PROPH/DIAG INJ SC/IM    Reviewed

 

                    2010 12:00 AM    B12 Injection Ndc#[address]-95 ([facility])    Reviewed

 

                    2010 12:00 AM    THER/PROPH/DIAG INJ SC/IM    Reviewed

 

                    2010 12:00 AM    Kenalog 40 Mg Im-Ndc#[address]-70 ([facility])    Reviewed

 

                    10/15/2010 12:00 AM    FLU VACCINE 3 YRS & > IM    Reviewed

 

                    10/15/2010 12:00 AM    Admin.Of M/C Cov.Vaccine-Flu Vacc.    Reviewed

 

                    1/15/2011 12:00 AM    COMPLETE CBC W/AUTO DIFF WBC    Reviewed

 

                    1/15/2011 12:00 AM    COMPREHEN METABOLIC PANEL    Reviewed

 

                    1/15/2011 12:00 AM    LIPID PANEL         Reviewed

 

                    2014 12:00 AM    MAMMOGRAM SCREENING    Reviewed

 

                    2014 12:00 AM    Screening mammography, bilateral    Reviewed

 

                    7/10/2014 12:00 AM    THER/PROPH/DIAG INJ SC/IM    Reviewed

 

                    7/10/2014 12:00 AM    B12 Injection, Up to 1000 Mcg NDC#0399-9625-39    Reviewed

 

                    2011 12:00 AM    COMPLETE CBC W/AUTO DIFF WBC    Reviewed

 

                    2011 12:00 AM    COMPREHEN METABOLIC PANEL    Reviewed

 

                    2011 12:00 AM    LIPID PANEL         Reviewed

 

                    2014 12:00 AM    B12 Injection, Up to 1000 Mcg NDC#7208-3899-98    Reviewed

 

                    10/19/2014 12:00 AM    MAMMOGRAM SCREENING    Reviewed

 

                    10/19/2014 12:00 AM    Screening mammography, bilateral    Reviewed

 

                    10/16/2014 12:00 AM    COMPLETE CBC W/AUTO DIFF WBC    Reviewed

 

                    10/16/2014 12:00 AM    COMPREHEN METABOLIC PANEL    Reviewed

 

                    10/16/2014 12:00 AM    IMMUNOASSAY TUMOR     Reviewed

 

                    10/16/2014 12:00 AM    LIPID PANEL         Reviewed

 

                    10/16/2014 12:00 AM    ASSAY OF LITHIUM    Reviewed

 

                    10/16/2014 12:00 AM    MAMMOGRAM SCREENING    Reviewed

 

                    2011 12:00 AM    ASSAY OF PARATHORMONE    Reviewed

 

                    2011 12:00 AM    VITAMIN D 25 HYDROXY    Reviewed

 

                    2011 12:00 AM    ASSAY OF LITHIUM    Reviewed

 

                    2011 12:00 AM    METABOLIC PANEL TOTAL CA    Reviewed

 

                    2011 12:00 AM    CT HEAD/BRAIN W/O & W/DYE    Reviewed

 

                    3/23/2015 12:00 AM    PNEUMOCOCCAL VACC 13 GLENDY IM    Reviewed

 

                    3/23/2015 12:00 AM    Vitamin B12 injection    Reviewed

 

                    2011 12:00 AM    ASSAY OF LITHIUM    Reviewed

 

                    2011 12:00 AM    B12 Injection Ndc#[address]-91  [facility]    Reviewed

 

                    2015 12:00 AM    THER/PROPH/DIAG INJ SC/IM    Reviewed

 

                    2015 12:00 AM    B12 Injection, Up to 1000 Mcg NDC#7931-3181-35    Reviewed

 

                    2015 12:00 AM    COMPLETE CBC W/AUTO DIFF WBC    Reviewed

 

                    2015 12:00 AM    COMPREHEN METABOLIC PANEL    Reviewed

 

                    2015 12:00 AM    LIPID PANEL         Reviewed

 

                    2015 12:00 AM    ASSAY OF LITHIUM    Reviewed

 

                    2011 12:00 AM    VIT D 1 25-DIHYDROXY    Reviewed

 

                    2011 12:00 AM    VITAMIN B-12        Reviewed

 

                    2015 12:00 AM    B12 Injection, Up to 1000 Mcg NDC#9241-9276-31    Reviewed

 

                    2015 12:00 AM    THER/PROPH/DIAG INJ SC/IM    Reviewed

 

                    2015 12:00 AM    B12 Injection, Up to 1000 Mcg NDC#9041-4256-95    Reviewed

 

                    2011 12:00 AM    THER/PROPH/DIAG INJ SC/IM    Reviewed

 

                    2011 12:00 AM    B12 Injection ([employer]) Ndc#0073-6264-31    Reviewed

 

                    2015 12:00 AM    THER/PROPH/DIAG INJ SC/IM    Reviewed

 

                    2015 12:00 AM    B12 Injection, Up to 1000 Mcg NDC#6991-5130-97    Reviewed







Results Summary







                          Data and Description      Results

 

                          2004 12:00 AM        Colonoscopy-Women and Men over 50 Normal 

 

                          2008 12:00 AM         Pap Smear Declined 

 

                          10/7/2009 12:00 AM        Cholest Cry Stone Ql .0 %LDLc SerPl-mCnc 123.0 mg/dLHDLc

 SerPl-mCnc 34.0 mg/dLTrigl SerPl-mCnc 190.0 mg/dLGlucose SerPl-mCnc 78.0 mg/dL

 

                          2009 12:00 AM        Mammogram -Women over 40 Normal HIV1+2 Ab Ser Ql no risk 

 

                          2010 8:47 AM         Dexa Bone Scan Refused Aspirin reccommended Contraindication 



 

                          2010 8:48 AM         Depression Done 

 

                          2010 12:00 AM         Foot Exam-Diabetic Done 

 

                          2010 12:00 AM         Cholest Cry Stone Ql .0 %LDLc SerPl-mCnc 126.0 mg/dLGlucose

 SerPl-mCnc 102.0 mg/dL

 

                          2010 8:45 AM          TRIGLYCERIDES 122.0 mg/dLCHOLESTEROL 186.0 mg/dLHDL 36.0 mg/dLTOT

 CHOL/HDL 5.2 LDL (CALC) 126.0 mg/dLGLUCOSE 102.0 mg/dLSODIUM 143.0 
mmol/LPOTASSIUM 3.70 mmol/LCHLORIDE 111.0 mmol/LCO2 23.0 mmol/LBUN 10.0 
mg/dLCREATININE 0.80 mg/dLSGOT/AST 12.0 IU/LSGPT/ALT 11.0 IU/LALK PHOS 65.0 
IU/LTOTAL PROTEIN 7.20 g/dLALBUMIN 3.90 g/dLTOTAL BILI 0.50 mg/dLCALCIUM 10.20 
mg/dLAGE 59 GFR NonAA 73 GFR AA 88 eGFR >60 mL/min/1.73 m2eGFR AA* >60 WBC 5.7 
RBC 3.26 HGB 10.60 g/dLHCT 31.70 %MCV 97.0 fLMCH 32.50 pgMCHC 33.40 g/dLRDW SD 
47 RDW CV 13.30 %MPV 9.70 fLPLT 287 NRBC# 0.00 NRBC% 0.0 %NEUT 62.90 %%LYMP 
21.80 %%MONO 9.90 %%EOS 5.0 %%BASO 0.40 %#NEUT 3.56 #LYMP 1.23 #MONO 0.56 #EOS 
0.28 #BASO 0.02 MANUAL DIFF NOT IND 

 

                          2010 12:00 AM        Glucose SerPl-mCnc 96.0 mg/dLCholest Cry Stone Ql .0 %LDLc

 SerPl-mCnc 146.0 mg/dL

 

                          2010 8:26 AM         TRIGLYCERIDES 106.0 mg/dLCHOLESTEROL 199.0 mg/dLHDL 32.0 mg/dLTOT

 CHOL/HDL 6.2 LDL (CALC) 146.0 mg/dLGLUCOSE 96.0 mg/dLSODIUM 143.0 
mmol/LPOTASSIUM 4.0 mmol/LCHLORIDE 113.0 mmol/LCO2 24.0 mmol/LBUN 13.0 
mg/dLCREATININE 1.0 mg/dLSGOT/AST 11.0 IU/LSGPT/ALT 6.0 IU/LALK PHOS 56.0 
IU/LTOTAL PROTEIN 6.60 g/dLALBUMIN 3.80 g/dLTOTAL BILI 0.50 mg/dLCALCIUM 9.30 
mg/dLAGE 59 GFR NonAA 57 GFR AA 69 eGFR 57 eGFR AA* >60 

 

                          10/6/2010 12:00 AM        Cholest Cry Stone Ql .0 %LDLc SerPl-mCnc 111.0 mg/dLGlucose

 SerPl-mCnc 81.0 mg/dL

 

                          10/6/2010 2:45 PM         TRIGLYCERIDES 123.0 mg/dLCHOLESTEROL 178.0 mg/dLHDL 42.0 mg/dLTOT

 CHOL/HDL 4.2 LDL (CALC) 111.0 mg/dLGLUCOSE 81.0 mg/dLSODIUM 139.0 
mmol/LPOTASSIUM 4.10 mmol/LCHLORIDE 106.0 mmol/LCO2 24.0 mmol/LBUN 13.0 
mg/dLCREATININE 0.90 mg/dLSGOT/AST 13.0 IU/LSGPT/ALT 11.0 IU/LALK PHOS 61.0 
IU/LTOTAL PROTEIN 7.10 g/dLALBUMIN 3.90 g/dLTOTAL BILI 0.30 mg/dLCALCIUM 9.30 
mg/dLAGE 60 GFR NonAA 64 GFR AA 78 eGFR >60 mL/min/1.73 m2eGFR AA* >60 WBC 6.9 
RBC 3.59 HGB 11.50 g/dLHCT 35.30 %MCV 98.0 fLMCH 32.0 pgMCHC 32.60 g/dLRDW SD 46
 RDW CV 12.90 %MPV 9.90 fLPLT 311 NRBC# 0.00 NRBC% 0.0 %NEUT 64.90 %%LYMP 22.50 
%%MONO 7.20 %%EOS 5.10 %%BASO 0.30 %#NEUT 4.45 #LYMP 1.54 #MONO 0.49 #EOS 0.35 
#BASO 0.02 MANUAL DIFF NOT IND 

 

                          2011 12:00 AM         Mammogram -Women over 40 Ordered 

 

                          2011 10:25 AM        TRIGLYCERIDES 111.0 mg/dLCHOLESTEROL 195.0 mg/dLHDL 43.0 mg/dLTOT

 CHOL/HDL 4.5 LDL (CALC) 130.0 mg/dLWBC 5.3 RBC 3.76 HGB 12.0 g/dLHCT 37.80 %MCV
 101.0 fLMCH 31.90 pgMCHC 31.70 g/dLRDW SD 47 RDW CV 13.0 %MPV 9.70 fLPLT 259 
NRBC# 0.00 NRBC% 0.0 %NEUT 69.0 %%LYMP 17.60 %%MONO 8.30 %%EOS 4.70 %%BASO 0.40 
%#NEUT 3.63 #LYMP 0.93 #MONO 0.44 #EOS 0.25 #BASO 0.02 MANUAL DIFF NOT IND 
GLUCOSE 102.0 mg/dLSODIUM 146.0 mmol/LPOTASSIUM 4.20 mmol/LCHLORIDE 113.0 
mmol/LCO2 23.0 mmol/LBUN 15.0 mg/dLCREATININE 1.0 mg/dLSGOT/AST 12.0 
IU/LSGPT/ALT 17.0 IU/LALK PHOS 60.0 IU/LTOTAL PROTEIN 6.90 g/dLALBUMIN 4.20 
g/dLTOTAL BILI 0.40 mg/dLCALCIUM 9.70 mg/dLAGE 60 GFR NonAA 57 GFR AA 69 eGFR 57
 eGFR AA* >60 

 

                          2011 11:49 AM        Cholest Cry Stone Ql .0 %LDLc SerPl-mCnc 130.0 mg/dLHDLc

 SerPl-mCnc 43.0 mg/dLTrigl SerPl-mCnc 111.0 mg/dLGlucose SerPl-mCnc 102.0 mg/dL

 

                          2011 11:52 AM        Pap Smear Declined 

 

                          2011 11:28 AM        Lithium 2.080 mmol/LGLUCOSE 102.0 mg/dLSODIUM 135.0 mmol/LPOTASSIUM

 3.90 mmol/LCHLORIDE 106.0 mmol/LCO2 21.0 mmol/LBUN 12.0 mg/dLCREATININE 1.30 
mg/dLCALCIUM 10.70 mg/dLAGE 60 GFR NonAA 42 GFR AA 51 eGFR 42 eGFR AA* 51 

 

                          2011 8:58 AM          Lithium 0.690 mmol/L

 

                          2011 2:38 PM         VITAMIN B12 3483.0 pg/mL

 

                          2013 3:35 PM          WBC 5.1 RBC 3.73 HGB 11.70 g/dLHCT 36.40 %MCV 98.0 fLMCH 31.40

 pgMCHC 32.10 g/dLRDW SD 47 RDW CV 13.10 %MPV 9.80 fLPLT 224 NRBC# 0.00 NRBC% 
0.0 %NEUT 66.80 %%LYMP 19.10 %%MONO 9.0 %%EOS 4.90 %%BASO 0.20 %#NEUT 3.42 #LYMP
 0.98 #MONO 0.46 #EOS 0.25 #BASO 0.01 MANUAL DIFF NOT IND GLUCOSE 88.0 
mg/dLSODIUM 141.0 mmol/LPOTASSIUM 4.10 mmol/LCHLORIDE 110.0 mmol/LCO2 22.0 
mmol/LBUN 22.0 mg/dLCREATININE 1.10 mg/dLCALCIUM 9.80 mg/dLAGE 62 GFR NonAA 50 
GFR AA 61 eGFR 50 eGFR AA* 60 Lithium 0.760 mmol/L

 

                          2013 11:02 AM        TRIGLYCERIDES 106.0 mg/dLCHOLESTEROL 181.0 mg/dLHDL 46.0 mg/dLTOT

 CHOL/HDL 3.9 LDL (CALC) 114.0 mg/dLVITAMIN D 41.10 ng/mL

 

                          10/17/2014 10:10 AM       WBC 5.0 RBC 3.66 HGB 11.60 g/dLHCT 36.80 %.0 fLMCH 31.70

 pgMCHC 31.50 g/dLRDW SD 50 RDW CV 13.50 %MPV 10.10 fLPLT 209 NRBC# 0.00 NRBC% 
0.0 %NEUT 69.20 %%LYMP 21.0 %%MONO 6.40 %%EOS 3.20 %%BASO 0.20 %#NEUT 3.46 #LYMP
 1.05 #MONO 0.32 #EOS 0.16 #BASO 0.01 MANUAL DIFF NOT IND GLUCOSE 100.0 
mg/dLSODIUM 148.0 mmol/LPOTASSIUM 3.90 mmol/LCHLORIDE 114.0 mmol/LCO2 26.0 
mmol/LBUN 12.0 mg/dLCREATININE 1.20 mg/dLSGOT/AST 9.0 IU/LSGPT/ALT <6 IU/LALK 
PHOS 82.0 IU/LTOTAL PROTEIN 6.90 g/dLALBUMIN 4.0 g/dLTOTAL BILI 0.40 
mg/dLCALCIUM 10.50 mg/dLAGE 64 GFR NonAA 45 GFR AA 55 eGFR 45 eGFR AA* 55 
TRIGLYCERIDES 96.0 mg/dLCHOLESTEROL 155.0 mg/dLHDL 38.0 mg/dLTOT CHOL/HDL 4.1 
LDL (CALC) 98.0 mg/dLLithium 0.850 mmol/LCancer Antigen (CA) 125 8.30 U/mL

 

                          2015 10:25 AM        Lithium 0.790 mmol/LWBC 4.8 RBC 3.44 HGB 11.0 g/dLHCT 35.20 

%.0 fLMCH 32.0 pgMCHC 31.30 g/dLRDW SD 53 RDW CV 14.0 %MPV 9.30 fLPLT 210
 NRBC# 0.00 NRBC% 0.0 %NEUT 70.80 %%LYMP 17.20 %%MONO 8.10 %%EOS 3.50 %%BASO 
0.40 %#NEUT 3.41 #LYMP 0.83 #MONO 0.39 #EOS 0.17 #BASO 0.02 MANUAL DIFF NOT IND 
TRIGLYCERIDES 107.0 mg/dLCHOLESTEROL 174.0 mg/dLHDL 43.0 mg/dLTOT CHOL/HDL 4.0 
LDL (CALC) 110.0 mg/dLGLUCOSE 90.0 mg/dLSODIUM 145.0 mmol/LPOTASSIUM 3.80 
mmol/LCHLORIDE 115.0 mmol/LCO2 24.0 mmol/LBUN 17.0 mg/dLCREATININE 1.30 
mg/dLSGOT/AST 18.0 IU/LSGPT/ALT 17.0 IU/LALK PHOS 56.0 IU/LTOTAL PROTEIN 6.70 
g/dLALBUMIN 3.90 g/dLTOTAL BILI 0.40 mg/dLCALCIUM 9.80 mg/dLAGE 64 GFR NonAA 41 
GFR AA 50 eGFR 41 eGFR AA* 50 

 

                          2015 8:50 AM        WBC 5.8 RBC 3.29 HGB 10.70 g/dLHCT 34.0 %.0 fLMCH 32.50

 pgMCHC 31.50 g/dLRDW SD 52 RDW CV 13.60 %MPV 9.60 fLPLT 223 NRBC# 0.00 NRBC% 
0.0 %NEUT 69.60 %%LYMP 18.90 %%MONO 8.50 %%EOS 2.80 %%BASO 0.20 %#NEUT 4.03 
#LYMP 1.09 #MONO 0.49 #EOS 0.16 #BASO 0.01 MANUAL DIFF NOT IND Lithium 0.620 
mmol/LGLUCOSE 83.0 mg/dLSODIUM 139.0 mmol/LPOTASSIUM 3.90 mmol/LCHLORIDE 109.0 
mmol/LCO2 22.0 mmol/LBUN 19.0 mg/dLCREATININE 1.40 mg/dLSGOT/AST 19.0 
IU/LSGPT/ALT 21.0 IU/LALK PHOS 55.0 IU/LTOTAL PROTEIN 6.50 g/dLALBUMIN 3.90 
g/dLTOTAL BILI 0.50 mg/dLCALCIUM 9.60 mg/dLAGE 65 GFR NonAA 38 GFR AA 46 eGFR 38
 eGFR AA* 46 TRIGLYCERIDES 121.0 mg/dLCHOLESTEROL 192.0 mg/dLHDL 51.0 mg/dLTOT 
CHOL/HDL 3.8 .0 mg/dLFREE T4 0.79 TSH 1.210 uIU/mLHemoglobin A1c 5.40 
%Estim. Avg Glu (eAG) 108 

 

                          3/15/2016 8:08 AM         VITAMIN B12 696.0 pg/mL

 

                          3/23/2016 8:26 AM         WBC 7.0 RBC 3.61 HGB 11.80 g/dLHCT 37.70 %.0 fLMCH 32.70

 pgMCHC 31.30 g/dLRDW SD 49 RDW CV 12.50 %MPV 10.0 fLPLT 207 NRBC# 0.00 NRBC% 
0.0 %NEUT 73.60 %%LYMP 16.40 %%MONO 6.60 %%EOS 3.0 %%BASO 0.30 %#NEUT 5.15 #LYMP
 1.15 #MONO 0.46 #EOS 0.21 #BASO 0.02 MANUAL DIFF NOT IND Lithium 0.940 
mmol/LGLUCOSE 108.0 mg/dLSODIUM 143.0 mmol/LPOTASSIUM 4.30 mmol/LCHLORIDE 110.0 
mmol/LCO2 27.0 mmol/LBUN 16.0 mg/dLCREATININE 1.60 mg/dLSGOT/AST 13.0 
IU/LSGPT/ALT 7.0 IU/LALK PHOS 71.0 IU/LTOTAL PROTEIN 6.80 g/dLALBUMIN 4.0 
g/dLTOTAL BILI 0.20 mg/dLCALCIUM 10.40 mg/dLAGE 65 GFR NonAA 32 GFR AA 39 eGFR 
32 eGFR AA* 39 TRIGLYCERIDES 113.0 mg/dLCHOLESTEROL 169.0 mg/dLHDL 42.0 mg/dLTOT
 CHOL/HDL 4.0 LDL (CALC) 104.0 mg/dLFREE T4 0.86 TSH 2.20 uIU/mLHemoglobin A1c 
5.20 %Estim. Avg Glu (eAG) 103 

 

                          3/25/2016 9:17 AM         COLOR YELLOW APPEARANCE CLEAR SPEC GRAV 1.010 pH 7.0 PROTEIN 

NEGATIVE GLUCOSE NEGATIVE mg/dLKETONE NEGATIVE BILIRUBIN NEGATIVE BLOOD NEGATIVE
 NITRITE NEGATIVE [place] SCREEN SMALL MICRO IND? SEE BELOW WBC/HPF 0-5 RBC/HPF 
NEGATIVE CASTS/LPF NEGATIVE /LPFCRYSTALS NEGATIVE MUCOUS THRDS NEGATIVE BACTERIA
 NEGATIVE EPITH CELLS FEW SQUAMOUS /HPFTRICHOMONAS NEGATIVE YEAST NEGATIVE 

 

                          2016 6:00 AM        GLUCOSE 91.0 mg/dLSODIUM 143.0 mmol/LPOTASSIUM 3.60 mmol/LCHLORIDE

 112.0 mmol/LCO2 23.0 mmol/LBUN 22.0 mg/dLCREATININE 1.20 mg/dLSGOT/AST 15.0 
IU/LSGPT/ALT 12.0 IU/LALK PHOS 61.0 IU/LTOTAL PROTEIN 5.40 g/dLALBUMIN 3.10 
g/dLTOTAL BILI 0.40 mg/dLCALCIUM 8.40 mg/dLAGE 66 GFR NonAA 45 GFR AA 55 eGFR 45
 eGFR AA* 55 WBC 3.0 RBC 3.05 HGB 9.80 g/dLHCT 32.10 %.0 fLMCH 32.10 
pgMCHC 30.50 g/dLRDW SD 54 RDW CV 14.20 %MPV 10.10 fLPLT 170 NRBC# 0.00 NRBC% 
0.0 %NEUT 50.70 %%LYMP 32.60 %%MONO 10.50 %%EOS 5.90 %%BASO 0.30 %#NEUT 1.54 
#LYMP 0.99 #MONO 0.32 #EOS 0.18 #BASO 0.01 MANUAL DIFF NOT IND 

 

                          2016 2:09 PM        COLOR YELLOW APPEARANCE CLEAR SPEC GRAV 1.010 pH 5.0 PROTEIN

 30 GLUCOSE NEGATIVE mg/dLKETONE NEGATIVE BILIRUBIN NEGATIVE BLOOD LARGE NITRITE
 NEGATIVE [facility] SCREEN MODERATE MICRO INDICATED? SEE BELOW WBC/HPF  RBC/HPF
 20-50 CASTS/LPF NEGATIVE /LPFCRYSTALS NEGATIVE MUCOUS THRDS NEGATIVE BACTERIA 
NEGATIVE EPITH CELLS FEW SQUAMOUS /HPFTRICHOMONAS NEGATIVE YEAST NEGATIVE CULT 
SET UP? YES 

 

                          1/3/2017 4:08 PM          COLOR YELLOW APPEARANCE HAZY SPEC GRAV 1.015 pH 6.0 PROTEIN 30

 GLUCOSE NEGATIVE mg/dLKETONE NEGATIVE BILIRUBIN NEGATIVE BLOOD MODERATE NITRITE
 NEGATIVE [facility] SCREEN LARGE MICRO INDICATED? SEE BELOW WBC/-200 RBC/HPF 
5-10 CASTS/LPF NEGATIVE /LPFCRYSTALS NEGATIVE MUCOUS THRDS NEGATIVE BACTERIA 
NEGATIVE EPITH CELLS 1+ SQUAMOUS /HPFTRICHOMONAS NEGATIVE YEAST NEGATIVE CULT 
SET UP? YES 







History Of Immunizations







       Name    Date Admin    Mfg Name    Mfg Code    Trade Name    Lot#    Route    Inj    Vis Given    Vis

 Pub                                    CVX

 

        Influenza    2008    Not Entered    NE      Not Entered            Not Entered    Not Entered

                    1            999

 

        X       12/19/2008    [employer]    MSD     Pneumovax 23            Intramuscular    Not Entered

                    1            999

 

           Influenza    10/15/2010    Isabella Oliver Arely.    NOV        Fluvirin > 12 Years    

[id number]     Intramuscular    Left Deltoid    10/15/2010    2009    999

 

          X         3/23/2015    [facility]yeth-Ayerst-[facility]ederleBrijesh    [facility]       Prevnar 13    [id number]    Intramuscular

                Right Gluteous Medius    3/23/2015       2013       109







History of Past Illness







                    Name                Date of Onset       Comments

 

                    Peritoneal Neoplasm, Malignant                         

 

                    Hyperlipidemia                           

 

                    Bipolar disorder, unspecified                         

 

                    Artificial opening status; colostomy                         

 

                    B12 deficiency                           

 

                    Anemia, Pernicious                         

 

                    Arthritis unspecified                         

 

                    cervical cancer                          

 

                    Artificial opening status; colostomy    2010  1:10PM     

 

                    Bipolar disorder, unspecified    2010  1:10PM     

 

                    Hyperlipidemia      2010  1:10PM     

 

                    Anemia, Pernicious    2010  1:10PM     

 

                    Postoperative Follow-Up    2010  1:55PM     

 

                    Postoperative Follow-Up    Mar  8 2010 10:57AM     

 

                    Artificial opening status; colostomy    Mar  8 2010  1:19PM     

 

                    Peritoneal Neoplasm, Malignant    Mar  8 2010  1:19PM     

 

                    Artificial opening status; colostomy    2010  1:40PM     

 

                    Hyperlipidemia      2010  1:40PM     

 

                    Anemia, Pernicious    2010  1:40PM     

 

                    Peritoneal Neoplasm, Malignant    2010  1:40PM     

 

                    Arthritis unspecified    2010  1:40PM     

 

                    Anemia of Chronic Illness    2010  1:40PM     

 

                    Tinea corporis      2010  3:17PM     

 

                    Bipolar disorder, unspecified    2010  1:33PM     

 

                    Hyperlipidemia      2010  1:33PM     

 

                    Anemia, Pernicious    2010  1:33PM     

 

                    Peritoneal Neoplasm, Malignant    2010  1:33PM     

 

                    B12 deficiency      2010  1:33PM     

 

                    Ethmoidal Sinusitis, Acute    Sep 21 2010  3:53PM     

 

                    Wheezing            Sep 21 2010  3:53PM     

 

                    Flu                 Oct 15 2010  1:40PM     

 

                    Bipolar disorder, unspecified    Oct 15 2010  1:42PM     

 

                    Hyperlipidemia      Oct 15 2010  1:42PM     

 

                    Anemia, Pernicious    Oct 15 2010  1:42PM     

 

                    Peritoneal Neoplasm, Malignant    Oct 15 2010  1:42PM     

 

                    Bipolar disorder, unspecified    2011 12:01PM     

 

                    Hyperlipidemia      2011 12:01PM     

 

                    Anemia, Pernicious    2011 12:01PM     

 

                    Peritoneal Neoplasm, Malignant    2011 12:01PM     

 

                    Bipolar disorder, unspecified    Apr 15 2011 10:55AM     

 

                    Major Depression    2011 10:11AM     

 

                    Bipolar Disorder    2011 10:11AM     

 

                    Cancer              May 10 2011  4:16PM     

 

                    Major Depression    May 10 2011  3:16PM     

 

                    Bipolar Disorder    May 10 2011  3:16PM     

 

                    Hypercalcemia       May 23 2011  2:47PM     

 

                    Bipolar disorder, unspecified    May 23 2011  2:47PM     

 

                    Colon Cancer, Personal History    May 23 2011  2:47PM     

 

                    Bipolar Disorder    May 31 2011  4:39PM     

 

                    Depressive Disorder    2011 10:01AM     

 

                    Vitamin B12 deficiency    2011 10:01AM     

 

                    Vitamin D Deficiency    2011  5:07PM     

 

                    Anemia, Vitamin B12 Deficiency    2011  5:07PM     

 

                    B12 deficiency      2011  3:56PM     

 

                    Routine gynecological examination    Aug  4 2011  9:08AM     

 

                    Screening Examination for Breast Cancer    Aug  4 2011  9:08AM     

 

                    Tinea Corporis      Aug  4 2011  9:08AM     

 

                    Depressive Disorder    Sep 23 2011  8:47AM     

 

                    Contact Dermatitis    Sep 23 2011  8:47AM     

 

                    Anemia, Pernicious    Sep 23 2011  8:47AM     

 

                    B12 deficiency      Sep 23 2011  8:47AM     

 

                    B12 deficiency      Sep 27 2011  2:58PM     

 

                    B12 deficiency      Oct 20 2011  2:34PM     

 

                    Flu                 Dec  9 2011  3:16PM     

 

                    B12 deficiency      Dec  9 2011  3:17PM     

 

                    B12 deficiency      2012  4:52PM     

 

                    B12 deficiency      2012 11:10AM     

 

                    B12 deficiency      2012  3:37PM     

 

                    B12 deficiency      May  3 2012  4:10PM     

 

                    B12 deficiency      2012  2:54PM     

 

                    B12 deficiency      2012 11:23AM     

 

                    B12 deficiency      Aug  9 2012  2:08PM     

 

                    B12 deficiency      Sep  6 2012  4:36PM     

 

                    B12 deficiency      Oct 16 2012 10:23AM     

 

                    Flu                 Feb  2013  3:11PM     

 

                    Bipolar disorder, unspecified    Feb  2013  2:48PM     

 

                    Anemia, Pernicious    Feb  4   2:48PM     

 

                    B12 deficiency      2013  2:48PM     

 

                    Extrapyramidal abnormal movement disorder    2013  2:48PM     

 

                    B12 deficiency      Apr  3 2013 12:03PM     

 

                    Bipolar disorder, unspecified    May  7 2013  1:31PM     

 

                    Anemia, Pernicious    May  7 2013  1:31PM     

 

                    B12 deficiency      May  7 2013  1:31PM     

 

                    Extrapyramidal abnormal movement disorder    May  7 2013  1:31PM     

 

                    B12 deficiency      2013  3:42PM     

 

                    B12 deficiency      2013  1:31PM     

 

                    Hyperlipidemia      Aug  7 2013 10:37AM     

 

                    Vitamin D Deficiency    Aug  7 2013 10:37AM     

 

                    Bipolar disorder, unspecified    Aug  7 2013 10:37AM     

 

                    Anemia, Pernicious    Aug  7 2013 10:37AM     

 

                    B12 deficiency      Aug  7 2013 10:37AM     

 

                    B12 deficiency      Sep 25 2013 11:15AM     

 

                    B12 deficiency      Dec 11 2013  3:16PM     

 

                    B12 deficiency      Mar  6 2014  1:48PM     

 

                    B12 deficiency      May 21 2014  3:17PM     

 

                    Screening Examination for Breast Cancer    2014  3:23PM     

 

                    Periumbilical abdominal pain    2014  3:23PM     

 

                    B12 deficiency      Jul 10 2014  2:52PM     

 

                    Anemia, Vitamin B12 Deficiency    Aug 13 2014  4:50PM     

 

                    Bipolar disorder    Oct 16 2014 11:13AM     

 

                    Hyperlipidemia      Oct 16 2014 11:13AM     

 

                    Anemia, Pernicious    Oct 16 2014 11:13AM     

 

                    Peritoneal Neoplasm, Malignant    Oct 16 2014 11:13AM     

 

                    Screening breast examination    Oct 16 2014 11:13AM     

 

                    Weight loss         Oct 16 2014 11:13AM     

 

                    Anemia, Pernicious    Mar 23 2015  2:57PM     

 

                    B12 deficiency      Mar 23 2015  2:57PM     

 

                    Need for Prevnar vaccine    Mar 23 2015  2:57PM     

 

                    Bipolar disorder    Mar 23 2015  2:57PM     

 

                    Hyperlipidemia      Mar 23 2015  2:57PM     

 

                    Anemia, Pernicious    Mar 23 2015  2:57PM     

 

                    Peritoneal Neoplasm, Malignant    Mar 23 2015  2:57PM     

 

                    B12 deficiency      May  4 2015  4:48PM     

 

                    Hyperlipidemia      May 13 2015  9:56AM     

 

                    Anemia              May 13 2015  9:56AM     

 

                    Bipolar disorder    May 13 2015  9:56AM     

 

                    Bipolar disorder    May 14 2015  3:27PM     

 

                    Hyperlipidemia      May 14 2015  3:27PM     

 

                    Anemia, Pernicious    May 14 2015  3:27PM     

 

                    Peritoneal Neoplasm, Malignant    May 14 2015  3:27PM     

 

                    B12 deficiency      2015  2:20PM     

 

                    B12 deficiency      2015 11:34AM     

 

                    B12 deficiency      Aug 18 2015  9:06AM     

 

                    Tinea Corporis      Sep 18 2015  8:54AM     

 

                    B12 deficiency      Sep 18 2015  8:54AM     

 

                    B12 deficiency      2015 10:28AM     

 

                    Herpes zoster without complication    Dec  3 2015  9:52AM     

 

                    B12 deficiency      Dec 23 2015 11:21AM     

 

                    B12 deficiency      2016  4:51PM     

 

                    Vitamin B 12 deficiency    Mar 14 2016  5:35PM     

 

                    B12 deficiency      Mar 15 2016 12:14PM     

 

                    B12 deficiency      May  5 2016 11:30AM     

 

                    Edema               May  5 2016 11:30AM     

 

                    Dermatitis          May  5 2016 11:30AM     

 

                    Edema               May 17 2016  8:38AM     

 

                    Shortness of breath    May 17 2016  8:38AM     

 

                    Bilateral edema of lower extremity    2016  2:06PM     

 

                    B12 deficiency      2016  2:06PM     

 

                    B12 deficiency      2016 11:50AM     

 

                    B12 deficiency      2016 11:20AM     

 

                    Diarrhea            Aug  2 2016  3:13PM     

 

                    B12 deficiency      Aug 24 2016 11:10AM     

 

                    Encounter for screening mammogram for breast cancer    Aug 24 2016 11:44AM     

 

                    B12 deficiency      Sep 28 2016  2:35PM     

 

                    B12 deficiency      Dec 15 2016  2:02PM     

 

                    Dysuria             Dec 29 2016 12:14PM     

 

                    Hematuria           Fidencio  3 2017  1:33PM     

 

                    Constipation by delayed colonic transit    2017  1:52PM     

 

                    Ileus               2017  1:52PM     

 

                    UTI (urinary tract infection)    Fidencio 15 2017  3:39PM     

 

                    Acute cystitis with hematuria    2017 11:07AM     

 

                    B12 deficiency      2017 11:07AM     

 

                    B12 deficiency      2017 11:40AM     







Payers







           Insurance Name    Company Name    Plan Name    Plan Number    Policy Number    Policy Group

 Number                                 Start Date

 

                    Medicare RHC Medicare RHC              [id number]              N/A

 

                          [employer]    [employer]                 [id number]

                                                    

 

                    Medicare Part A    Medicare - Lab/Xray              [id number]              2006

 

                    Medicare Part B    Medicare [place]              [id number]              2006

 

                          [employer]    [employer]                 50 percent

                                                    2009

 

                    [employer]    [employer]              [id number]              N/A

 

                    Medicare Part A    Medicare Part A              [id number]              N/A

 

                    Medicare Part A    Medicare Part A              [id number]              2006









History of Encounters







                    Visit Date          Visit Type          Provider

 

                    2017           Office visit        Radha GREEN

 

                    1/15/2017           Office visit        Aj Tapia NP

 

                    2017            Office visit        Devin Masterson MD

 

                    2016          [facility]            Devin Masterson MD

 

                    12/15/2016          Nurse visit         Bhupinder Louise DO

 

                    2016           Nurse visit         Bret Forte APRN

 

                    2016           Nurse visit         Bhupinder Aspen DO

 

                    2016            Office visit        Bhupinder Aspen DO

 

                    2016           Nurse visit         Bhupinder Aspen DO

 

                    2016           Office visit        Bret Reanna APRN

 

                    2016            Office visit        Bhupinder Aspen DO

 

                    3/15/2016           Nurse visit         Bhupinder Aspen DO

 

                    2016            Nurse visit         Bhupinder Aspen DO

 

                    2015          Nurse visit         Bhupinder Aspen DO

 

                    12/3/2015           Office visit        Bhupinder Aspen DO

 

                    2015          Nurse visit         Bhupinder Aspen DO

 

                    2015           Office visit        Bhupinder Aspen DO

 

                    2015           Nurse visit         Bhupinder Aspen DO

 

                    2015            Nurse visit         Bhupinder Aspen DO

 

                    2015            Nurse visit         Bhupinder Aspen DO

 

                    2015           Office visit        Bhupinder Aspen DO

 

                    2015            Nurse visit         Bhupinder Aspen DO

 

                    3/23/2015           Office visit        Bhupinder Aspen DO

 

                    10/16/2014          Office visit        Bhupinder Aspen DO

 

                    2014           Nurse visit         Radha Weston APRN

 

                    7/10/2014           Nurse visit         Bhupinder Aspen DO

 

                    2014           Office visit        Bhupinder Aspen DO

 

                    2014           Nurse visit         Bhupinder Aspen DO

 

                    3/6/2014            Nurse visit         Bhupinder Aspen DO

 

                    2014            [facility]            EARNEST Lopez MD

 

                    2013          Nurse visit         Bhupinder Aspen DO

 

                    2013           Nurse visit         Bhupinder Aspen DO

 

                    2013            Office visit        Bhupinder Aspen DO

 

                    2013            Nurse visit         Bhupinder Aspen DO

 

                    2013            Nurse visit         Bhupinder Aspen DO

 

                    2013            Office visit        Bhupinder Aspen DO

 

                    4/3/2013            Nurse visit         Bhupinder Aspen DO

 

                    2013            Office visit        Bhupinder Aspen DO

 

                    10/16/2012          Nurse visit         Bhupinder Aspen DO

 

                    2012            Nurse visit         Bhupinder Aspen DO

 

                    2012            Voided              Bhupinder Apsen DO

 

                    2012            Nurse visit         Bhupinder Aspen DO

 

                    2012            Nurse visit         Bhupinder Aspen DO

 

                    2012           Nurse visit         Bhupinder Aspen DO

 

                    5/3/2012            Nurse visit         Bhupinder Aspen DO

 

                    2012           Nurse visit         Bhupinder Aspen DO

 

                    2012           Nurse visit         Buhpinder Aspen DO

 

                    2012           Nurse visit         Bhupinder Aspen DO

 

                    2011           Nurse visit         Bhupinder Aspen DO

 

                    10/20/2011          Nurse visit         Bhupinder Aspen DO

 

                    2011           Office visit        Bhupinder Aspen DO

 

                    2011           Nurse visit         Radha GREEN

 

                    2011            Office visit        Bhupinder Aspen DO

 

                    2011           Nurse visit         Bhupinder Aspen DO

 

                    2011            Office visit        Bhupinder Aspen DO

 

                    2011           Office visit        Bhupinder Aspen DO

 

                    5/10/2011           Office visit        Bhupinder Aspen DO

 

                    2011           Office visit        Bhupinder Aspen DO

 

                    4/15/2011           Office visit        Devin Angel DO

 

                    2011           Office visit        Devin Angel DO

 

                    10/15/2010          Office visit        Devin Angel DO

 

                    2010           Office visit        Devin Angel DO

 

                    2010            Office visit        Devin Angel DO

 

                    2010           Office visit        Devin Angel DO

 

                    2010            Office visit        Devin Angel DO

 

                    3/8/2010            Office visit        Devin Masterson MD

 

                    2010            Surgery             Devin Masterson MD

 

                    2010            Office visit        Devin Angel DO

 

                    2010           Surgery             Devin Masterson MD

 

                    2010           Hospital            Devin Masterson MD

 

                    2010           [facility]            Devin Masterson MD

 

                    10/22/2009          Office visit        Devin Angel DO

## 2019-06-26 NOTE — XMS REPORT
MU2 Ambulatory Summary

                             Created on: 2017



Pauline Gan

External Reference #: [id number]

: 1950

Sex: Female



Demographics







                          Address                   [address]

[place]  [id number]

 

                          Home Phone                [phone]

 

                          Preferred Language        English

 

                          Marital Status            Legally 

 

                          Adventism Affiliation     Unknown

 

                          Race                      White

 

                          Ethnic Group              Not  or 





Author







                          Bhupinder Aguilar

 

                          [facility]

 

                          Address                   [address]

[place]  [id number]



 

                          Phone                     [phone]







Care Team Providers







                    Care Team Member Name    Role                Phone

 

                    Bhupinder Louise    PCP                 Unavailable

 

                    Bhupinder Louise    PreferredProvider    Unavailable







Allergies and Adverse Reactions







                    Name                Reaction            Notes

 

                    NO KNOWN DRUG ALLERGIES                         







Plan of Treatment







             Planned Activity    Comments     Planned Date    Planned Time    Plan/Goal

 

             Injection, Subcutaneous/IM                 2016    12:00 AM      

 

             Injection, Subcutaneous/IM                 2016    12:00 AM      

 

             Mammography; bilateral                 2016    12:00 AM      

 

             Injection, Subcutaneous/IM                 2016    12:00 AM      

 

             Injection,Subcutaneous/Intramuscul, [facility]                 2017    12:00 AM      

 

             Swallowing evaluation                 2017    12:00 AM      

 

             CBC with Auto                 2013     12:00 AM      

 

             Lithium                   2013     12:00 AM      

 

             Basic metabolic panel                 2013     12:00 AM      

 

             Vitamin B12 (cyanocobalamin)                 2013     12:00 AM      

 

             Folic acid, serum                 2013     12:00 AM      

 

             Injection,Subcutaneous/Intramuscul                 2013    12:00 AM      







Medications







                                        Active 

 

             Name         Start Date    Estimated Completion Date    SIG          Comments

 

                Latuda 20 mg oral tablet                                    take 1 tablet (20 mg) by oral route once daily with

 food (at least 350 calories)            

 

             pravastatin 40 mg oral tablet    3/30/2015                 TAKE 1 TABLET BY MOUTH DAILY     

 

                Namenda XR 28 mg oral capsule,sprinkle,ER 24hr    2015                       take 1 capsule (28

 mg) by oral route once daily            

 

                Namenda XR 28 mg oral capsule,sprinkle,ER 24hr    2016                       take 1 capsule (28

 mg) by oral route once daily            

 

                potassium chloride 10 mEq oral tablet extended release    2016                       take 1 tablet

 (10 meq) by oral route once daily       

 

             pravastatin 40 mg oral tablet    2016                 TAKE 1 TABLET BY MOUTH DAILY     

 

                Vitamin B-12 1,000 mcg/mL injection solution    2016                       inject 1 milliliter 

(1,000 mcg) by intramuscular route once a month     

 

                potassium chloride 10 mEq oral tablet extended release    2016                      take 1 tablet

 (10 meq) by oral route once daily       

 

                Namenda XR 28 mg oral capsule,sprinkle,ER 24hr    2016                      TAKE 1 CAPSULE BY

 MOUTH EVERY DAY                         

 

                furosemide 40 mg oral tablet    2016                      take 1 tablet (40 mg) by oral route

 once daily                              

 

                mirtazapine 45 mg oral tablet                                    take 1 tablet (45 mg) by oral route once daily

 before bedtime                          

 

             Fish Oil 300-1,000 mg oral capsule                              take 1 capsule by oral route daily     

 

             Vitamin D3 1,000 unit oral tablet                              take 1 tablet by oral route daily     

 

                Calcium 600 600 mg calcium (1,500 mg) oral tablet                                    take 1 tablet by oral route

 daily                                   

 

                Aspirin Low Dose 81 mg oral tablet,delayed release (DR/EC)                                    take 1 tablet 

(81 mg) by oral route once daily         









                                         

 

             Name         Start Date    Expiration Date    SIG          Comments

 

             Reglan 10mg    3/29/2010    2010    one ac and hs     

 

                Keflex 500 mg oral capsule    2010       10/1/2010       take 1 capsule (500 mg) by oral

 route every 6 hours for 10 days         

 

                Bactrim -160 mg oral tablet    2011       take 1 tablet by oral route

 every 12 hours for 7 days               

 

                triamcinolone acetonide 0.1 % topical cream    2011      apply a thin

 layer to the affected area(s) by topical route 2 times per day     

 

                sertraline 100 mg oral tablet    4/10/2012       5/10/2012       take 1.5 tablets by oral route

 daily for 30 days                       

 

                ergocalciferol (vitamin D2) 50,000 unit oral capsule    4/15/2013       2013       TAKE

 ONE CAPSULE BY MOUTH ONCE A WEEK        

 

                CYANOCOBALAM 1000MCGINJ 1000 milliliter    2013       INJECT 1ML INTRAMUSCULAR

 ONCE A MONTH                            

 

                pravastatin 40 mg oral tablet    3/25/2014       3/20/2015       TAKE ONE TABLET BY MOUTH EVERY

 DAY                                     

 

                          Zostavax (PF) 19,400 unit/0.65 mL subcutaneous suspension for reconstitution    3/23/2015

                    3/24/2015           inject 0.65 milliliter by subcutaneous route once     

 

                famciclovir 500 mg oral tablet    12/3/2015       12/10/2015      take 1 tablet (500 mg) by

 oral route every 8 hours for 7 days     

 

                furosemide 40 mg oral tablet    2016      take 1 tablet (40 mg) by oral

 route once daily                        

 

                Cipro 500 mg oral tablet    2016       take 1 tablet (500 mg) by oral route

 2 times per day for 5 days              

 

                Bactrim -160 mg oral tablet    2016        take 1 tablet by oral route

 every 12 hours for 7 days               

 

                metoclopramide HCl 10 mg oral tablet    2017       take 1 tablet by oral

 route 2 times a day for 50 days         

 

                Macrobid 100 mg oral capsule    2017       take 1 capsule (100 mg) by oral

 route 2 times per day with food for 7 days     

 

                Augmentin 875-125 mg oral tablet    2017       take 1 tablet by oral route

 every 12 hours for 7 days               









                                        Discontinued 

 

             Name         Start Date    Discontinued Date    SIG          Comments

 

                Tylenol 325 mg oral tablet                    2013        take 1 - 2 tablets (325 -650 mg) by oral

 route every 4-6 hours as needed         

 

                Calcium 600 + D(3) 600 mg(1,500mg) -400 unit oral tablet                    2011       take 1 tablet

 by oral route 2 times a day            no longer taking

 

                Vitamin B-12 1,000 mcg oral tablet extended release    2010       take 1

 tablet by oral route daily             no longer taking

 

                Antifungal (clotrimazole) 1 % topical cream    2010       apply to the 

affected and surrounding areas of skin by topical route 2 times per day morning 
and evening                              

 

                sertraline 100 mg oral tablet    5/10/2011       2011       take 2 tablets (200 mg) by 

oral route once daily                   discontinued by Dr. Serrano

 

                mirtazapine 15 mg oral tablet                    2011        take 1 tablet (15 mg) by oral route 

once daily before bedtime               Dr. Serrano

 

                mirtazapine 15 mg oral tablet                    2011        take 1 tablet (15 mg) by oral route 

once daily before bedtime               dc'd by Dr. Serrano

 

                Pristiq 50 mg oral tablet extended release 24 hr                    2013        take 1 tablet (50

 mg) by oral route once daily           Dr. Zach

 

                Pristiq 50 mg oral tablet extended release 24 hr                    2013        take 1 tablet (50

 mg) by oral route once daily           dose updated

 

                Vitamin B-12 1,000 mcg/mL injection solution    2011        inject 1 milliliter

 (1,000 mcg) by intramuscular route once a month    on list already

 

                    syringe with needle 1 mL 25 gauge x 1" miscellaneous syringe    2011

                          use for injection once a month     

 

                clotrimazole 1 % topical cream    2011        apply to the affected and surrounding

 areas of skin by topical route 2 times per day in the morning and evening     

 

                Vitamin D2 50,000 unit oral capsule    2011        take 1 capsule (50,000

 unit) by oral route once weekly        generic on list

 

                Pravachol 40 mg oral tablet    2012        take 1 tablet (40 mg) by oral 

route once daily for 90 days            generic on list

 

                lithium carbonate 300 mg oral capsule    2012        take 1 capsule by oral

 route daily                            dose updated

 

                Pristiq 100 mg oral tablet extended release 24 hr                    4/10/2012       take 1 and 1/2 

tablet (150 mg) by oral route once daily    Mental Health provider

 

                Pristiq 100 mg oral tablet extended release 24 hr                    4/10/2012       take 1 and 1/2 

tablet (150 mg) by oral route once daily    Discontinued by Dr [facility]frain [facility] at [facility]

 

                hydroxyzine HCl 50 mg oral tablet    10/16/2014      2015       take 1 tablet (50 mg) 

by oral route at bedtime                 

 

                lithium carbonate 300 mg oral capsule    2015       take 1 capsule (300

 mg) by oral route 2 for 30 days         

 

                fluconazole 100 mg oral tablet    2015       12/3/2015       take 1 tablet (100 mg) by 

oral route once a week                   

 

                ketoconazole 2 % topical cream    2015       12/3/2015       apply to the affected area(s)

 by topical route 2 times per day        

 

                prednisone 10 mg oral tablet    12/3/2015       2016        take 2 tablets (20 mg) by oral

 route once daily for 4 days 1 tablet daily for 4 days 0.5 tablet daily for 4 
days                                     

 

                triamcinolone acetonide 0.1 % topical cream    2016       apply a thin layer

 to the affected area(s) by topical route 2 times per day     

 

                Cipro 500 mg oral tablet    1/15/2017       2017       take 1 tablet (500 mg) by oral route

 every 12 hours for 10 days              







Problem List







                    Description         Status              Onset

 

                    Artificial opening status; colostomy    Active               

 

                    Bipolar disorder, unspecified    Active               

 

                    Hyperlipidemia      Active               

 

                    Peritoneal Neoplasm, Malignant    Active               

 

                    Anemia, Pernicious    Active               

 

                    Arthritis unspecified    Active               

 

                    B12 deficiency      Active               







Vital Signs







      Date    Time    BP-Sys(mm[Hg]    BP-Lynn(mm[Hg])    HR(bpm)    RR(rpm)    Temp    WT    HT    HC    BMI

                    BSA                 BMI Percentile      O2 Sat(%)

 

        2017    3:05:00 PM    134 mmHg    70 mmHg    70 bpm    20 rpm    97.4 F    181 lbs    69 in

                          26.73 kg/m2    2.00 m2                   98 %

 

        2017    11:07:00 AM    124 mmHg    64 mmHg    62 bpm    17 rpm    98.2 F    181.2 lbs    69

 in                       26.7583 kg/m    2.0003 m                 98 %

 

        1/15/2017    3:34:00 PM    148 mmHg    89 mmHg    69 bpm    20 rpm    98.2 F    179 lbs    69 in

                          26.43 kg/m2    1.99 m2                   98 %

 

       2017    1:51:00 PM    160 mmHg    90 mmHg    100 bpm    20 rpm    96.5 F    179 lbs             

                                                                98 %

 

       2016    3:11:00 PM    134 mmHg    76 mmHg    80 bpm    20 rpm    98 F    163 lbs    69 in     

                24.07 kg/m2     1.90 m2                         98 %

 

        2016    2:04:00 PM    142 mmHg    86 mmHg    68 bpm    16 rpm    98.5 F    166 lbs    63 in

                          29.4053 kg/m    1.8295 m                 100 %

 

        2016    11:27:00 AM    148 mmHg    78 mmHg    90 bpm    20 rpm    98.2 F    153 lbs    69 in

                          22.59 kg/m2    1.84 m2                   96 %

 

        12/3/2015    9:50:00 AM    132 mmHg    70 mmHg    62 bpm    16 rpm    97.9 F    145 lbs    69 in

                          21.4125 kg/m    1.7894 m                 100 %

 

        2015    8:52:00 AM    132 mmHg    68 mmHg    52 bpm    20 rpm    97.8 F    141 lbs    69 in

                          20.82 kg/m2    1.76 m2                   100 %

 

        2015    3:25:00 PM    120 mmHg    62 mmHg    72 bpm    16 rpm    98.1 F    136 lbs    69 in

                          20.0835 kg/m    1.733 m                 98 %

 

       3/23/2015    2:55:00 PM    130 mmHg    76 mmHg    68 bpm    18 rpm    97 F    140 lbs    69 in    

                20.67 kg/m2     1.76 m2                         98 %

 

        10/16/2014    11:11:00 AM    120 mmHg    66 mmHg    77 bpm    20 rpm    98 F    130 lbs    69 in

                          19.1974 kg/m    1.6943 m                 100 %

 

        2014    3:21:00 PM    130 mmHg    66 mmHg    63 bpm    18 rpm    97.2 F    160 lbs    69 in

                          23.6276 kg/m    1.88 m2                   99 %

 

        2013    10:35:00 AM    132 mmHg    70 mmHg    66 bpm    20 rpm    98.1 F    157 lbs    69 in

                          23.18 kg/m2    1.862 m                  

 

        2013    1:29:00 PM    132 mmHg    70 mmHg    76 bpm    18 rpm    98.2 F    166 lbs    69 in 

                          24.5137 kg/m    1.91 m2                    

 

       2013    2:46:00 PM    128 mmHg    70 mmHg    76 bpm    16 rpm    98 F    160 lbs    69 in     

                23.63 kg/m2     1.8797 m                       

 

        2011    8:49:00 AM    128 mmHg    78 mmHg    70 bpm    18 rpm    97.9 F    164 lbs    69 in

                          24.2183 kg/m    1.90 m2                    

 

     2011    1:31:00 PM    132 mmHg    68 mmHg    84 bpm         97 F    167 lbs                        

                                         

 

        2011    9:09:00 AM    128 mmHg    70 mmHg    72 bpm    18 rpm    98.2 F    163 lbs    64 in 

                          27.9786 kg/m    1.83 m2                    

 

       2011    10:01:00 AM    132 mmHg    70 mmHg    72 bpm    18 rpm    98.2 F    154 lbs             

                                                                 

 

       2011    2:47:00 PM    128 mmHg    70 mmHg    72 bpm    18 rpm    97.8 F    156 lbs             

                                                                 

 

       5/10/2011    3:16:00 PM    144 mmHg    80 mmHg    72 bpm    18 rpm    98.2 F    158 lbs             

                                                                 

 

        2011    10:11:00 AM    132 mmHg    70 mmHg    70 bpm    18 rpm    98.2 F    168 lbs    69 in

                          24.809 kg/m    1.93 m2                    

 

        4/15/2011    10:52:00 AM    110 mmHg    60 mmHg    75 bpm    16 rpm    97.5 F    172.375 lbs    

69 in                     25.46 kg/m2    1.951 m                 100 %

 

        2011    11:43:00 AM    120 mmHg    82 mmHg    75 bpm    16 rpm    97.2 F    178.5 lbs    69

 in                       26.3596 kg/m    1.99 m2                   100 %

 

        10/15/2010    1:32:00 PM    120 mmHg    70 mmHg    80 bpm    18 rpm    96.6 F    177 lbs    69 in

                          26.14 kg/m2    1.977 m                 100 %

 

        2010    3:50:00 PM    168 mmHg    100 mmHg    82 bpm    18 rpm    97.8 F    177.5 lbs    69

 in                       26.2119 kg/m    1.98 m2                   97 %

 

        2010    1:21:00 PM    140 mmHg    80 mmHg    59 bpm    16 rpm    97.6 F    173.25 lbs    69 

in                        25.58 kg/m2    1.956 m                 100 %

 

        2010    3:02:00 PM    140 mmHg    80 mmHg    61 bpm    16 rpm    97.6 F    173.125 lbs    69

 in                       25.5658 kg/m    1.96 m2                   99 %

 

        2010    1:23:00 PM    130 mmHg    80 mmHg    66 bpm    16 rpm    96.8 F    173 lbs    69 in 

                          25.55 kg/m2    1.9546 m                 100 %

 

        2010    12:58:00 PM    130 mmHg    88 mmHg    75 bpm    16 rpm    98.4 F    172.25 lbs    69

 in                       25.4366 kg/m    1.95 m2                   100 %







Social History







                    Name                Description         Comments

 

                    denies alcohol use                         

 

                    denies smoking                           

 

                    Denies illicit substance abuse                         

 

                    retired                                 direct care

 

                    Single                                   

 

                    Exercises regularly                         

 

                    Attended some college                         

 

                    Tobacco             Never smoker         







History of Procedures







                    Date Ordered        Description         Order Status

 

                    2010 12:00 AM    COMPREHEN METABOLIC PANEL    Reviewed

 

                    2010 12:00 AM    COMPLETE CBC W/AUTO DIFF WBC    Reviewed

 

                    2010 12:00 AM    LIPID PANEL         Reviewed

 

                          2015 12:00 AM        B12 Injection, Up to 1000 Mcg NDC#3321-4032-25 [facility] 

                                        Reviewed

 

                    2011 12:00 AM    MAMMOGRAM SCREENING    Reviewed

 

                    2011 12:00 AM    CYTOPATH C/V THIN LAYER    Reviewed

 

                    2011 12:00 AM    B12 Injection 1 cc NDC#[address]-29    Reviewed

 

                    2015 12:00 AM    THER/PROPH/DIAG INJ SC/IM    Reviewed

 

                    2015 12:00 AM    B12 Injection, Up to 1000 Mcg NDC#1407-5434-75    Reviewed

 

                    2011 12:00 AM    THER/PROPH/DIAG INJ SC/IM    Reviewed

 

                    2011 12:00 AM    B12 Injection(Arabella) Ndc#8402-3212-64-    Reviewed

 

                    2015 12:00 AM    THER/PROPH/DIAG INJ SC/IM    Reviewed

 

                    2015 12:00 AM    B12 Injection, Up to 1000 Mcg NDC#0904-8276-62    Reviewed

 

                    10/20/2011 12:00 AM    THER/PROPH/DIAG INJ SC/IM    Reviewed

 

                    10/20/2011 12:00 AM    B12 Injection(Arabella) Ndc#1336-1956-41-    Reviewed

 

                    2016 12:00 AM    THER/PROPH/DIAG INJ SC/IM    Reviewed

 

                    2016 12:00 AM    B12 Injection, Up to 1000 Mcg NDC#2274-7106-62    Reviewed

 

                    3/14/2016 12:00 AM    VITAMIN B-12        Reviewed

 

                    3/15/2016 12:00 AM    THER/PROPH/DIAG INJ SC/IM    Reviewed

 

                    3/15/2016 12:00 AM    B12 Injection, Up to 1000 Mcg NDC#3448-8362-52    Reviewed

 

                    2011 12:00 AM    ***Immunization administration, Medicare flu    Reviewed

 

                    2011 12:00 AM    Fluzone ** MEDICARE Only **    Reviewed

 

                    2011 12:00 AM    THER/PROPH/DIAG INJ SC/IM    Reviewed

 

                    2011 12:00 AM    B12 Injection ([employer] [facility]) Ndc#9968-7650-00    Reviewed

 

                    2016 12:00 AM    B12 Injection, Up to 1000 Mcg NDC#1404-2216-23 [facility]    

Reviewed

 

                    2016 12:00 AM    TTE W/DOPPLER COMPLETE    Reviewed

 

                    2016 12:00 AM    EXTREMITY STUDY     Returned

 

                          2016 12:00 AM        B12 Injection, Up to 1000 Mcg NDC#9764-3775-20 [facility] 

                                        Reviewed

 

                    2016 12:00 AM    THER/PROPH/DIAG INJ SC/IM    Reviewed

 

                    2012 12:00 AM    THER/PROPH/DIAG INJ SC/IM    Reviewed

 

                    2012 12:00 AM    B12 Injection ([employer]) Ndc#4867-4817-58    Reviewed

 

                    2016 12:00 AM    THER/PROPH/DIAG INJ SC/IM    Reviewed

 

                    2016 12:00 AM    B12 Injection, Up to 1000 Mcg NDC#4432-7634-93    Reviewed

 

                    2012 12:00 AM    THER/PROPH/DIAG INJ SC/IM    Reviewed

 

                    2012 12:00 AM    B12 Injection(Arabella) Ndc#0214-5938-45-    Reviewed

 

                    12/15/2016 12:00 AM    B12 Injection, Up to 1000 Mcg NDC#4222-6818-32    Reviewed

 

                    12/15/2016 12:00 AM    THER/PROPH/DIAG INJ SC/IM    Reviewed

 

                    2016 12:00 AM    URNLS DIP STICK/TABLET RGNT AUTO W/O MICROSCOPY    Returned

 

                    1/3/2017 12:00 AM    URNLS DIP STICK/TABLET RGNT AUTO W/O MICROSCOPY    Returned

 

                    2017 12:00 AM    URINE CULTURE/COLONY COUNT    Returned

 

                    2017 12:00 AM    Rocephin 1 gram Injection, [facility]    Reviewed

 

                    2017 12:00 AM    THERAPEUTIC PROPHYLACTIC/DX INJECTION SUBQ/IM    Reviewed

 

                    2017 12:00 AM    B12 1000mcg Injection, [facility]    Reviewed

 

                    5/3/2012 12:00 AM    THER/PROPH/DIAG INJ SC/IM    Reviewed

 

                    5/3/2012 12:00 AM    B12 Injection(Arbaella) Ndc#2874-5398-96-    Reviewed

 

                    2017 12:00 AM    MRI BRAIN STEM W/O & W/DYE    Returned

 

                    2017 12:00 AM    VITAMIN B-12        Reviewed

 

                    2017 12:00 AM    ASSAY OF CREATININE    Reviewed

 

                    2012 12:00 AM    IMMUNOTHERAPY INJECTIONS    Reviewed

 

                    2012 12:00 AM    B12 Injection(Arabella) Ndc#6144-1007-68-    Reviewed

 

                    2012 12:00 AM    THER/PROPH/DIAG INJ SC/IM    Reviewed

 

                    2012 12:00 AM    B12 Injection, Up to 1000 Mcg NDC#0120-2884-74    Reviewed

 

                    2012 12:00 AM    THER/PROPH/DIAG INJ SC/IM    Reviewed

 

                    2012 12:00 AM    B12 Injection, Up to 1000 Mcg NDC#9774-1974-22    Reviewed

 

                    2012 12:00 AM    THER/PROPH/DIAG INJ SC/IM    Reviewed

 

                    2012 12:00 AM    B12 Injection, Up to 1000 Mcg NDC#2428-0068-77    Reviewed

 

                    10/16/2012 12:00 AM    THER/PROPH/DIAG INJ SC/IM    Reviewed

 

                    10/16/2012 12:00 AM    B12 Injection, Up to 1000 Mcg NDC#9765-3225-38    Reviewed

 

                    2010 12:00 AM    COMPREHEN METABOLIC PANEL    Reviewed

 

                    2010 12:00 AM    COMPLETE CBC W/AUTO DIFF WBC    Reviewed

 

                    2010 12:00 AM    LIPID PANEL         Reviewed

 

                    2013 12:00 AM    Flu Injection 3 Years And Above NDC# [address]-28  [facility]    Reviewed



 

                    2013 12:00 AM    COMPLETE CBC W/AUTO DIFF WBC    Reviewed

 

                    2013 12:00 AM    ASSAY OF LITHIUM    Reviewed

 

                    2013 12:00 AM    METABOLIC PANEL TOTAL CA    Reviewed

 

                    4/3/2013 12:00 AM    THER/PROPH/DIAG INJ SC/IM    Reviewed

 

                    4/3/2013 12:00 AM    B12 Injection, Up to 1000 Mcg NDC#4195-2864-01    Reviewed

 

                    2013 12:00 AM    THER/PROPH/DIAG INJ SC/IM    Reviewed

 

                    2013 12:00 AM    B12 Injection, Up to 1000 Mcg NDC#2053-2621-99    Reviewed

 

                    2013 12:00 AM    THER/PROPH/DIAG INJ SC/IM    Reviewed

 

                    2013 12:00 AM    B12 Injection, Up to 1000 Mcg NDC#1142-3795-82    Reviewed

 

                    2013 12:00 AM    LIPID PANEL         Reviewed

 

                    2013 12:00 AM    VITAMIN D 25 HYDROXY    Reviewed

 

                    2013 12:00 AM    THER/PROPH/DIAG INJ SC/IM    Reviewed

 

                    3/6/2014 12:00 AM    THER/PROPH/DIAG INJ SC/IM    Reviewed

 

                    2014 12:00 AM    THER/PROPH/DIAG INJ SC/IM    Reviewed

 

                    2014 12:00 AM    B12 Injection, Up to 1000 Mcg NDC#8882-6716-70    Reviewed

 

                    2010 12:00 AM    SKIN FUNGI CULTURE    Reviewed

 

                    10/9/2010 12:00 AM    COMPREHEN METABOLIC PANEL    Reviewed

 

                    10/9/2010 12:00 AM    LIPID PANEL         Reviewed

 

                    2010 12:00 AM    THER/PROPH/DIAG INJ SC/IM    Reviewed

 

                    2010 12:00 AM    B12 Injection Ndc#[address]-48 ([facility])    Reviewed

 

                    2010 12:00 AM    THER/PROPH/DIAG INJ SC/IM    Reviewed

 

                    2010 12:00 AM    Kenalog 40 Mg Im-Ndc#[address]-28 ([facility])    Reviewed

 

                    10/15/2010 12:00 AM    FLU VACCINE 3 YRS & > IM    Reviewed

 

                    10/15/2010 12:00 AM    Admin.Of M/C Cov.Vaccine-Flu Vacc.    Reviewed

 

                    1/15/2011 12:00 AM    COMPLETE CBC W/AUTO DIFF WBC    Reviewed

 

                    1/15/2011 12:00 AM    COMPREHEN METABOLIC PANEL    Reviewed

 

                    1/15/2011 12:00 AM    LIPID PANEL         Reviewed

 

                    2014 12:00 AM    MAMMOGRAM SCREENING    Reviewed

 

                    2014 12:00 AM    Screening mammography, bilateral    Reviewed

 

                    7/10/2014 12:00 AM    THER/PROPH/DIAG INJ SC/IM    Reviewed

 

                    7/10/2014 12:00 AM    B12 Injection, Up to 1000 Mcg NDC#9443-9880-77    Reviewed

 

                    2011 12:00 AM    COMPLETE CBC W/AUTO DIFF WBC    Reviewed

 

                    2011 12:00 AM    COMPREHEN METABOLIC PANEL    Reviewed

 

                    2011 12:00 AM    LIPID PANEL         Reviewed

 

                    2014 12:00 AM    B12 Injection, Up to 1000 Mcg NDC#7921-9448-77    Reviewed

 

                    10/19/2014 12:00 AM    MAMMOGRAM SCREENING    Reviewed

 

                    10/19/2014 12:00 AM    Screening mammography, bilateral    Reviewed

 

                    10/16/2014 12:00 AM    COMPLETE CBC W/AUTO DIFF WBC    Reviewed

 

                    10/16/2014 12:00 AM    COMPREHEN METABOLIC PANEL    Reviewed

 

                    10/16/2014 12:00 AM    IMMUNOASSAY TUMOR     Reviewed

 

                    10/16/2014 12:00 AM    LIPID PANEL         Reviewed

 

                    10/16/2014 12:00 AM    ASSAY OF LITHIUM    Reviewed

 

                    10/16/2014 12:00 AM    MAMMOGRAM SCREENING    Reviewed

 

                    2011 12:00 AM    ASSAY OF PARATHORMONE    Reviewed

 

                    2011 12:00 AM    VITAMIN D 25 HYDROXY    Reviewed

 

                    2011 12:00 AM    ASSAY OF LITHIUM    Reviewed

 

                    2011 12:00 AM    METABOLIC PANEL TOTAL CA    Reviewed

 

                    2011 12:00 AM    CT HEAD/BRAIN W/O & W/DYE    Reviewed

 

                    3/23/2015 12:00 AM    PNEUMOCOCCAL VACC 13 GELNDY IM    Reviewed

 

                    3/23/2015 12:00 AM    Vitamin B12 injection    Reviewed

 

                    2011 12:00 AM    ASSAY OF LITHIUM    Reviewed

 

                    2011 12:00 AM    B12 Injection Ndc#[address]-81  [facility]    Reviewed

 

                    2015 12:00 AM    THER/PROPH/DIAG INJ SC/IM    Reviewed

 

                    2015 12:00 AM    B12 Injection, Up to 1000 Mcg NDC#5483-4562-67    Reviewed

 

                    2015 12:00 AM    COMPLETE CBC W/AUTO DIFF WBC    Reviewed

 

                    2015 12:00 AM    COMPREHEN METABOLIC PANEL    Reviewed

 

                    2015 12:00 AM    LIPID PANEL         Reviewed

 

                    2015 12:00 AM    ASSAY OF LITHIUM    Reviewed

 

                    2011 12:00 AM    VIT D 1 25-DIHYDROXY    Reviewed

 

                    2011 12:00 AM    VITAMIN B-12        Reviewed

 

                    2015 12:00 AM    B12 Injection, Up to 1000 Mcg NDC#3299-1076-25    Reviewed

 

                    2015 12:00 AM    THER/PROPH/DIAG INJ SC/IM    Reviewed

 

                    2015 12:00 AM    B12 Injection, Up to 1000 Mcg NDC#0249-1820-65    Reviewed

 

                    2011 12:00 AM    THER/PROPH/DIAG INJ SC/IM    Reviewed

 

                    2011 12:00 AM    B12 Injection ([employer]) Ndc#1876-3769-56    Reviewed

 

                    2015 12:00 AM    THER/PROPH/DIAG INJ SC/IM    Reviewed

 

                    2015 12:00 AM    B12 Injection, Up to 1000 Mcg NDC#3157-9794-80    Reviewed







Results Summary







                          Data and Description      Results

 

                          2004 12:00 AM        Colonoscopy-Women and Men over 50 Normal 

 

                          2008 12:00 AM         Pap Smear Declined 

 

                          10/7/2009 12:00 AM        Cholest Cry Stone Ql .0 %LDLc SerPl-mCnc 123.0 mg/dLHDLc

 SerPl-mCnc 34.0 mg/dLTrigl SerPl-mCnc 190.0 mg/dLGlucose SerPl-mCnc 78.0 mg/dL

 

                          2009 12:00 AM        Mammogram -Women over 40 Normal HIV1+2 Ab Ser Ql no risk 

 

                          2010 8:47 AM         Dexa Bone Scan Refused Aspirin reccommended Contraindication 



 

                          2010 8:48 AM         Depression Done 

 

                          2010 12:00 AM         Foot Exam-Diabetic Done 

 

                          2010 12:00 AM         Cholest Cry Stone Ql .0 %LDLc SerPl-mCnc 126.0 mg/dLGlucose

 SerPl-mCnc 102.0 mg/dL

 

                          2010 8:45 AM          TRIGLYCERIDES 122.0 mg/dLCHOLESTEROL 186.0 mg/dLHDL 36.0 mg/dLTOT

 CHOL/HDL 5.2 LDL (CALC) 126.0 mg/dLGLUCOSE 102.0 mg/dLSODIUM 143.0 
mmol/LPOTASSIUM 3.70 mmol/LCHLORIDE 111.0 mmol/LCO2 23.0 mmol/LBUN 10.0 
mg/dLCREATININE 0.80 mg/dLSGOT/AST 12.0 IU/LSGPT/ALT 11.0 IU/LALK PHOS 65.0 
IU/LTOTAL PROTEIN 7.20 g/dLALBUMIN 3.90 g/dLTOTAL BILI 0.50 mg/dLCALCIUM 10.20 
mg/dLAGE 59 GFR NonAA 73 GFR AA 88 eGFR >60 mL/min/1.73 m2eGFR AA* >60 WBC 5.7 
RBC 3.26 HGB 10.60 g/dLHCT 31.70 %MCV 97.0 fLMCH 32.50 pgMCHC 33.40 g/dLRDW SD 
47 RDW CV 13.30 %MPV 9.70 fLPLT 287 NRBC# 0.00 NRBC% 0.0 %NEUT 62.90 %%LYMP 
21.80 %%MONO 9.90 %%EOS 5.0 %%BASO 0.40 %#NEUT 3.56 #LYMP 1.23 #MONO 0.56 #EOS 
0.28 #BASO 0.02 MANUAL DIFF NOT IND 

 

                          2010 12:00 AM        Glucose SerPl-mCnc 96.0 mg/dLCholest Cry Stone Ql .0 %LDLc

 SerPl-mCnc 146.0 mg/dL

 

                          2010 8:26 AM         TRIGLYCERIDES 106.0 mg/dLCHOLESTEROL 199.0 mg/dLHDL 32.0 mg/dLTOT

 CHOL/HDL 6.2 LDL (CALC) 146.0 mg/dLGLUCOSE 96.0 mg/dLSODIUM 143.0 
mmol/LPOTASSIUM 4.0 mmol/LCHLORIDE 113.0 mmol/LCO2 24.0 mmol/LBUN 13.0 
mg/dLCREATININE 1.0 mg/dLSGOT/AST 11.0 IU/LSGPT/ALT 6.0 IU/LALK PHOS 56.0 
IU/LTOTAL PROTEIN 6.60 g/dLALBUMIN 3.80 g/dLTOTAL BILI 0.50 mg/dLCALCIUM 9.30 
mg/dLAGE 59 GFR NonAA 57 GFR AA 69 eGFR 57 eGFR AA* >60 

 

                          10/6/2010 12:00 AM        Cholest Cry Stone Ql .0 %LDLc SerPl-mCnc 111.0 mg/dLGlucose

 SerPl-mCnc 81.0 mg/dL

 

                          10/6/2010 2:45 PM         TRIGLYCERIDES 123.0 mg/dLCHOLESTEROL 178.0 mg/dLHDL 42.0 mg/dLTOT

 CHOL/HDL 4.2 LDL (CALC) 111.0 mg/dLGLUCOSE 81.0 mg/dLSODIUM 139.0 
mmol/LPOTASSIUM 4.10 mmol/LCHLORIDE 106.0 mmol/LCO2 24.0 mmol/LBUN 13.0 
mg/dLCREATININE 0.90 mg/dLSGOT/AST 13.0 IU/LSGPT/ALT 11.0 IU/LALK PHOS 61.0 
IU/LTOTAL PROTEIN 7.10 g/dLALBUMIN 3.90 g/dLTOTAL BILI 0.30 mg/dLCALCIUM 9.30 
mg/dLAGE 60 GFR NonAA 64 GFR AA 78 eGFR >60 mL/min/1.73 m2eGFR AA* >60 WBC 6.9 
RBC 3.59 HGB 11.50 g/dLHCT 35.30 %MCV 98.0 fLMCH 32.0 pgMCHC 32.60 g/dLRDW SD 46
 RDW CV 12.90 %MPV 9.90 fLPLT 311 NRBC# 0.00 NRBC% 0.0 %NEUT 64.90 %%LYMP 22.50 
%%MONO 7.20 %%EOS 5.10 %%BASO 0.30 %#NEUT 4.45 #LYMP 1.54 #MONO 0.49 #EOS 0.35 
#BASO 0.02 MANUAL DIFF NOT IND 

 

                          2011 12:00 AM         Mammogram -Women over 40 Ordered 

 

                          2011 10:25 AM        TRIGLYCERIDES 111.0 mg/dLCHOLESTEROL 195.0 mg/dLHDL 43.0 mg/dLTOT

 CHOL/HDL 4.5 LDL (CALC) 130.0 mg/dLWBC 5.3 RBC 3.76 HGB 12.0 g/dLHCT 37.80 %MCV
 101.0 fLMCH 31.90 pgMCHC 31.70 g/dLRDW SD 47 RDW CV 13.0 %MPV 9.70 fLPLT 259 
NRBC# 0.00 NRBC% 0.0 %NEUT 69.0 %%LYMP 17.60 %%MONO 8.30 %%EOS 4.70 %%BASO 0.40 
%#NEUT 3.63 #LYMP 0.93 #MONO 0.44 #EOS 0.25 #BASO 0.02 MANUAL DIFF NOT IND 
GLUCOSE 102.0 mg/dLSODIUM 146.0 mmol/LPOTASSIUM 4.20 mmol/LCHLORIDE 113.0 
mmol/LCO2 23.0 mmol/LBUN 15.0 mg/dLCREATININE 1.0 mg/dLSGOT/AST 12.0 
IU/LSGPT/ALT 17.0 IU/LALK PHOS 60.0 IU/LTOTAL PROTEIN 6.90 g/dLALBUMIN 4.20 
g/dLTOTAL BILI 0.40 mg/dLCALCIUM 9.70 mg/dLAGE 60 GFR NonAA 57 GFR AA 69 eGFR 57
 eGFR AA* >60 

 

                          2011 11:49 AM        Cholest Cry Stone Ql .0 %LDLc SerPl-mCnc 130.0 mg/dLHDLc

 SerPl-mCnc 43.0 mg/dLTrigl SerPl-mCnc 111.0 mg/dLGlucose SerPl-mCnc 102.0 mg/dL

 

                          2011 11:52 AM        Pap Smear Declined 

 

                          2011 11:28 AM        Lithium 2.080 mmol/LGLUCOSE 102.0 mg/dLSODIUM 135.0 mmol/LPOTASSIUM

 3.90 mmol/LCHLORIDE 106.0 mmol/LCO2 21.0 mmol/LBUN 12.0 mg/dLCREATININE 1.30 
mg/dLCALCIUM 10.70 mg/dLAGE 60 GFR NonAA 42 GFR AA 51 eGFR 42 eGFR AA* 51 

 

                          2011 8:58 AM          Lithium 0.690 mmol/L

 

                          2011 2:38 PM         VITAMIN B12 3483.0 pg/mL

 

                          2013 3:35 PM          WBC 5.1 RBC 3.73 HGB 11.70 g/dLHCT 36.40 %MCV 98.0 fLMCH 31.40

 pgMCHC 32.10 g/dLRDW SD 47 RDW CV 13.10 %MPV 9.80 fLPLT 224 NRBC# 0.00 NRBC% 
0.0 %NEUT 66.80 %%LYMP 19.10 %%MONO 9.0 %%EOS 4.90 %%BASO 0.20 %#NEUT 3.42 #LYMP
 0.98 #MONO 0.46 #EOS 0.25 #BASO 0.01 MANUAL DIFF NOT IND GLUCOSE 88.0 
mg/dLSODIUM 141.0 mmol/LPOTASSIUM 4.10 mmol/LCHLORIDE 110.0 mmol/LCO2 22.0 
mmol/LBUN 22.0 mg/dLCREATININE 1.10 mg/dLCALCIUM 9.80 mg/dLAGE 62 GFR NonAA 50 
GFR AA 61 eGFR 50 eGFR AA* 60 Lithium 0.760 mmol/L

 

                          2013 11:02 AM        TRIGLYCERIDES 106.0 mg/dLCHOLESTEROL 181.0 mg/dLHDL 46.0 mg/dLTOT

 CHOL/HDL 3.9 LDL (CALC) 114.0 mg/dLVITAMIN D 41.10 ng/mL

 

                          10/17/2014 10:10 AM       WBC 5.0 RBC 3.66 HGB 11.60 g/dLHCT 36.80 %.0 fLMCH 31.70

 pgMCHC 31.50 g/dLRDW SD 50 RDW CV 13.50 %MPV 10.10 fLPLT 209 NRBC# 0.00 NRBC% 
0.0 %NEUT 69.20 %%LYMP 21.0 %%MONO 6.40 %%EOS 3.20 %%BASO 0.20 %#NEUT 3.46 #LYMP
 1.05 #MONO 0.32 #EOS 0.16 #BASO 0.01 MANUAL DIFF NOT IND GLUCOSE 100.0 
mg/dLSODIUM 148.0 mmol/LPOTASSIUM 3.90 mmol/LCHLORIDE 114.0 mmol/LCO2 26.0 
mmol/LBUN 12.0 mg/dLCREATININE 1.20 mg/dLSGOT/AST 9.0 IU/LSGPT/ALT <6 IU/LALK 
PHOS 82.0 IU/LTOTAL PROTEIN 6.90 g/dLALBUMIN 4.0 g/dLTOTAL BILI 0.40 
mg/dLCALCIUM 10.50 mg/dLAGE 64 GFR NonAA 45 GFR AA 55 eGFR 45 eGFR AA* 55 
TRIGLYCERIDES 96.0 mg/dLCHOLESTEROL 155.0 mg/dLHDL 38.0 mg/dLTOT CHOL/HDL 4.1 
LDL (CALC) 98.0 mg/dLLithium 0.850 mmol/LCancer Antigen (CA) 125 8.30 U/mL

 

                          2015 10:25 AM        Lithium 0.790 mmol/LWBC 4.8 RBC 3.44 HGB 11.0 g/dLHCT 35.20 

%.0 fLMCH 32.0 pgMCHC 31.30 g/dLRDW SD 53 RDW CV 14.0 %MPV 9.30 fLPLT 210
 NRBC# 0.00 NRBC% 0.0 %NEUT 70.80 %%LYMP 17.20 %%MONO 8.10 %%EOS 3.50 %%BASO 
0.40 %#NEUT 3.41 #LYMP 0.83 #MONO 0.39 #EOS 0.17 #BASO 0.02 MANUAL DIFF NOT IND 
TRIGLYCERIDES 107.0 mg/dLCHOLESTEROL 174.0 mg/dLHDL 43.0 mg/dLTOT CHOL/HDL 4.0 
LDL (CALC) 110.0 mg/dLGLUCOSE 90.0 mg/dLSODIUM 145.0 mmol/LPOTASSIUM 3.80 
mmol/LCHLORIDE 115.0 mmol/LCO2 24.0 mmol/LBUN 17.0 mg/dLCREATININE 1.30 
mg/dLSGOT/AST 18.0 IU/LSGPT/ALT 17.0 IU/LALK PHOS 56.0 IU/LTOTAL PROTEIN 6.70 
g/dLALBUMIN 3.90 g/dLTOTAL BILI 0.40 mg/dLCALCIUM 9.80 mg/dLAGE 64 GFR NonAA 41 
GFR AA 50 eGFR 41 eGFR AA* 50 

 

                          2015 8:50 AM        WBC 5.8 RBC 3.29 HGB 10.70 g/dLHCT 34.0 %.0 fLMCH 32.50

 pgMCHC 31.50 g/dLRDW SD 52 RDW CV 13.60 %MPV 9.60 fLPLT 223 NRBC# 0.00 NRBC% 
0.0 %NEUT 69.60 %%LYMP 18.90 %%MONO 8.50 %%EOS 2.80 %%BASO 0.20 %#NEUT 4.03 
#LYMP 1.09 #MONO 0.49 #EOS 0.16 #BASO 0.01 MANUAL DIFF NOT IND Lithium 0.620 
mmol/LGLUCOSE 83.0 mg/dLSODIUM 139.0 mmol/LPOTASSIUM 3.90 mmol/LCHLORIDE 109.0 
mmol/LCO2 22.0 mmol/LBUN 19.0 mg/dLCREATININE 1.40 mg/dLSGOT/AST 19.0 
IU/LSGPT/ALT 21.0 IU/LALK PHOS 55.0 IU/LTOTAL PROTEIN 6.50 g/dLALBUMIN 3.90 
g/dLTOTAL BILI 0.50 mg/dLCALCIUM 9.60 mg/dLAGE 65 GFR NonAA 38 GFR AA 46 eGFR 38
 eGFR AA* 46 TRIGLYCERIDES 121.0 mg/dLCHOLESTEROL 192.0 mg/dLHDL 51.0 mg/dLTOT 
CHOL/HDL 3.8 .0 mg/dLFREE T4 0.79 TSH 1.210 uIU/mLHemoglobin A1c 5.40 
%Estim. Avg Glu (eAG) 108 

 

                          3/15/2016 8:08 AM         VITAMIN B12 696.0 pg/mL

 

                          3/23/2016 8:26 AM         WBC 7.0 RBC 3.61 HGB 11.80 g/dLHCT 37.70 %.0 fLMCH 32.70

 pgMCHC 31.30 g/dLRDW SD 49 RDW CV 12.50 %MPV 10.0 fLPLT 207 NRBC# 0.00 NRBC% 
0.0 %NEUT 73.60 %%LYMP 16.40 %%MONO 6.60 %%EOS 3.0 %%BASO 0.30 %#NEUT 5.15 #LYMP
 1.15 #MONO 0.46 #EOS 0.21 #BASO 0.02 MANUAL DIFF NOT IND Lithium 0.940 
mmol/LGLUCOSE 108.0 mg/dLSODIUM 143.0 mmol/LPOTASSIUM 4.30 mmol/LCHLORIDE 110.0 
mmol/LCO2 27.0 mmol/LBUN 16.0 mg/dLCREATININE 1.60 mg/dLSGOT/AST 13.0 
IU/LSGPT/ALT 7.0 IU/LALK PHOS 71.0 IU/LTOTAL PROTEIN 6.80 g/dLALBUMIN 4.0 
g/dLTOTAL BILI 0.20 mg/dLCALCIUM 10.40 mg/dLAGE 65 GFR NonAA 32 GFR AA 39 eGFR 
32 eGFR AA* 39 TRIGLYCERIDES 113.0 mg/dLCHOLESTEROL 169.0 mg/dLHDL 42.0 mg/dLTOT
 CHOL/HDL 4.0 LDL (CALC) 104.0 mg/dLFREE T4 0.86 TSH 2.20 uIU/mLHemoglobin A1c 
5.20 %Estim. Avg Glu (eAG) 103 

 

                          3/25/2016 9:17 AM         COLOR YELLOW APPEARANCE CLEAR SPEC GRAV 1.010 pH 7.0 PROTEIN 

NEGATIVE GLUCOSE NEGATIVE mg/dLKETONE NEGATIVE BILIRUBIN NEGATIVE BLOOD NEGATIVE
 NITRITE NEGATIVE [place] SCREEN SMALL MICRO IND? SEE BELOW WBC/HPF 0-5 RBC/HPF 
NEGATIVE CASTS/LPF NEGATIVE /LPFCRYSTALS NEGATIVE MUCOUS THRDS NEGATIVE BACTERIA
 NEGATIVE EPITH CELLS FEW SQUAMOUS /HPFTRICHOMONAS NEGATIVE YEAST NEGATIVE 

 

                          2016 6:00 AM        GLUCOSE 91.0 mg/dLSODIUM 143.0 mmol/LPOTASSIUM 3.60 mmol/LCHLORIDE

 112.0 mmol/LCO2 23.0 mmol/LBUN 22.0 mg/dLCREATININE 1.20 mg/dLSGOT/AST 15.0 
IU/LSGPT/ALT 12.0 IU/LALK PHOS 61.0 IU/LTOTAL PROTEIN 5.40 g/dLALBUMIN 3.10 
g/dLTOTAL BILI 0.40 mg/dLCALCIUM 8.40 mg/dLAGE 66 GFR NonAA 45 GFR AA 55 eGFR 45
 eGFR AA* 55 WBC 3.0 RBC 3.05 HGB 9.80 g/dLHCT 32.10 %.0 fLMCH 32.10 
pgMCHC 30.50 g/dLRDW SD 54 RDW CV 14.20 %MPV 10.10 fLPLT 170 NRBC# 0.00 NRBC% 
0.0 %NEUT 50.70 %%LYMP 32.60 %%MONO 10.50 %%EOS 5.90 %%BASO 0.30 %#NEUT 1.54 
#LYMP 0.99 #MONO 0.32 #EOS 0.18 #BASO 0.01 MANUAL DIFF NOT IND 

 

                          2016 2:09 PM        COLOR YELLOW APPEARANCE CLEAR SPEC GRAV 1.010 pH 5.0 PROTEIN

 30 GLUCOSE NEGATIVE mg/dLKETONE NEGATIVE BILIRUBIN NEGATIVE BLOOD LARGE NITRITE
 NEGATIVE [facility] SCREEN MODERATE MICRO INDICATED? SEE BELOW WBC/HPF  RBC/HPF
 20-50 CASTS/LPF NEGATIVE /LPFCRYSTALS NEGATIVE MUCOUS THRDS NEGATIVE BACTERIA 
NEGATIVE EPITH CELLS FEW SQUAMOUS /HPFTRICHOMONAS NEGATIVE YEAST NEGATIVE CULT 
SET UP? YES 

 

                          1/3/2017 4:08 PM          COLOR YELLOW APPEARANCE HAZY SPEC GRAV 1.015 pH 6.0 PROTEIN 30

 GLUCOSE NEGATIVE mg/dLKETONE NEGATIVE BILIRUBIN NEGATIVE BLOOD MODERATE NITRITE
 NEGATIVE [facility] SCREEN LARGE MICRO INDICATED? SEE BELOW WBC/-200 RBC/HPF 
5-10 CASTS/LPF NEGATIVE /LPFCRYSTALS NEGATIVE MUCOUS THRDS NEGATIVE BACTERIA 
NEGATIVE EPITH CELLS 1+ SQUAMOUS /HPFTRICHOMONAS NEGATIVE YEAST NEGATIVE CULT 
SET UP? YES 

 

                          2017 4:24 PM         CREATININE 1.50 mg/dLAGE 66 GFR NonAA 35 GFR AA 42 eGFR 35 eGFR

 AA* 42 VITAMIN B12 1324.0 pg/mL







History Of Immunizations







       Name    Date Admin    Mfg Name    Mfg Code    Trade Name    Lot#    Route    Inj    Vis Given    Vis

 Pub                                    CVX

 

        Influenza    2008    Not Entered    NE      Not Entered            Not Entered    Not Entered

                    1            999

 

        X       12/19/2008    [employer]    MSD     Pneumovax 23            Intramuscular    Not Entered

                    1            999

 

           Influenza    10/15/2010    Asoka Arely.    NOV        Fluvirin > 12 Years    

[id number]     Intramuscular    Left Deltoid    10/15/2010    2009    999

 

          X         3/23/2015    [facility]    [facility]       Prevnar 13    [id number]    Intramuscular

                Right Gluteous Medius    3/23/2015       2013       109







History of Past Illness







                    Name                Date of Onset       Comments

 

                    Peritoneal Neoplasm, Malignant                         

 

                    Hyperlipidemia                           

 

                    Bipolar disorder, unspecified                         

 

                    Artificial opening status; colostomy                         

 

                    B12 deficiency                           

 

                    Anemia, Pernicious                         

 

                    Arthritis unspecified                         

 

                    cervical cancer                          

 

                    Artificial opening status; colostomy    2010  1:10PM     

 

                    Bipolar disorder, unspecified    2010  1:10PM     

 

                    Hyperlipidemia      2010  1:10PM     

 

                    Anemia, Pernicious    2010  1:10PM     

 

                    Postoperative Follow-Up    2010  1:55PM     

 

                    Postoperative Follow-Up    Mar  8 2010 10:57AM     

 

                    Artificial opening status; colostomy    Mar  8 2010  1:19PM     

 

                    Peritoneal Neoplasm, Malignant    Mar  8 2010  1:19PM     

 

                    Artificial opening status; colostomy    2010  1:40PM     

 

                    Hyperlipidemia      2010  1:40PM     

 

                    Anemia, Pernicious    2010  1:40PM     

 

                    Peritoneal Neoplasm, Malignant    2010  1:40PM     

 

                    Arthritis unspecified    2010  1:40PM     

 

                    Anemia of Chronic Illness    2010  1:40PM     

 

                    Tinea corporis      2010  3:17PM     

 

                    Bipolar disorder, unspecified    2010  1:33PM     

 

                    Hyperlipidemia      2010  1:33PM     

 

                    Anemia, Pernicious    2010  1:33PM     

 

                    Peritoneal Neoplasm, Malignant    2010  1:33PM     

 

                    B12 deficiency      2010  1:33PM     

 

                    Ethmoidal Sinusitis, Acute    Sep 21 2010  3:53PM     

 

                    Wheezing            Sep 21 2010  3:53PM     

 

                    Flu                 Oct 15 2010  1:40PM     

 

                    Bipolar disorder, unspecified    Oct 15 2010  1:42PM     

 

                    Hyperlipidemia      Oct 15 2010  1:42PM     

 

                    Anemia, Pernicious    Oct 15 2010  1:42PM     

 

                    Peritoneal Neoplasm, Malignant    Oct 15 2010  1:42PM     

 

                    Bipolar disorder, unspecified    2011 12:01PM     

 

                    Hyperlipidemia      2011 12:01PM     

 

                    Anemia, Pernicious    2011 12:01PM     

 

                    Peritoneal Neoplasm, Malignant    2011 12:01PM     

 

                    Bipolar disorder, unspecified    Apr 15 2011 10:55AM     

 

                    Major Depression    2011 10:11AM     

 

                    Bipolar Disorder    2011 10:11AM     

 

                    Cancer              May 10 2011  4:16PM     

 

                    Major Depression    May 10 2011  3:16PM     

 

                    Bipolar Disorder    May 10 2011  3:16PM     

 

                    Hypercalcemia       May 23 2011  2:47PM     

 

                    Bipolar disorder, unspecified    May 23 2011  2:47PM     

 

                    Colon Cancer, Personal History    May 23 2011  2:47PM     

 

                    Bipolar Disorder    May 31 2011  4:39PM     

 

                    Depressive Disorder    2011 10:01AM     

 

                    Vitamin B12 deficiency    2011 10:01AM     

 

                    Vitamin D Deficiency    2011  5:07PM     

 

                    Anemia, Vitamin B12 Deficiency    2011  5:07PM     

 

                    B12 deficiency      2011  3:56PM     

 

                    Routine gynecological examination    Aug  4 2011  9:08AM     

 

                    Screening Examination for Breast Cancer    Aug  4 2011  9:08AM     

 

                    Tinea Corporis      Aug  4 2011  9:08AM     

 

                    Depressive Disorder    Sep 23 2011  8:47AM     

 

                    Contact Dermatitis    Sep 23 2011  8:47AM     

 

                    Anemia, Pernicious    Sep 23 2011  8:47AM     

 

                    B12 deficiency      Sep 23 2011  8:47AM     

 

                    B12 deficiency      Sep 27 2011  2:58PM     

 

                    B12 deficiency      Oct 20 2011  2:34PM     

 

                    Flu                 Dec  9 2011  3:16PM     

 

                    B12 deficiency      Dec  9 2011  3:17PM     

 

                    B12 deficiency      2012  4:52PM     

 

                    B12 deficiency      Feb 2012 11:10AM     

 

                    B12 deficiency      2012  3:37PM     

 

                    B12 deficiency      May  3 2012  4:10PM     

 

                    B12 deficiency      2012  2:54PM     

 

                    B12 deficiency      2012 11:23AM     

 

                    B12 deficiency      Aug  9 2012  2:08PM     

 

                    B12 deficiency      Sep  6 2012  4:36PM     

 

                    B12 deficiency      Oct 16 2012 10:23AM     

 

                    Flu                 Feb  4   3:11PM     

 

                    Bipolar disorder, unspecified    Feb  2013  2:48PM     

 

                    Anemia, Pernicious    Feb  4   2:48PM     

 

                    B12 deficiency      Feb  4   2:48PM     

 

                    Extrapyramidal abnormal movement disorder    Feb  4   2:48PM     

 

                    B12 deficiency      Apr  3 2013 12:03PM     

 

                    Bipolar disorder, unspecified    May  7 2013  1:31PM     

 

                    Anemia, Pernicious    May  7 2013  1:31PM     

 

                    B12 deficiency      May  7 2013  1:31PM     

 

                    Extrapyramidal abnormal movement disorder    May  7 2013  1:31PM     

 

                    B12 deficiency      2013  3:42PM     

 

                    B12 deficiency      2013  1:31PM     

 

                    Hyperlipidemia      Aug  7 2013 10:37AM     

 

                    Vitamin D Deficiency    Aug  7 2013 10:37AM     

 

                    Bipolar disorder, unspecified    Aug  7 2013 10:37AM     

 

                    Anemia, Pernicious    Aug  7 2013 10:37AM     

 

                    B12 deficiency      Aug  7 2013 10:37AM     

 

                    B12 deficiency      Sep 25 2013 11:15AM     

 

                    B12 deficiency      Dec 11 2013  3:16PM     

 

                    B12 deficiency      Mar  6 2014  1:48PM     

 

                    B12 deficiency      May 21 2014  3:17PM     

 

                    Screening Examination for Breast Cancer    2014  3:23PM     

 

                    Periumbilical abdominal pain    2014  3:23PM     

 

                    B12 deficiency      Jul 10 2014  2:52PM     

 

                    Anemia, Vitamin B12 Deficiency    Aug 13 2014  4:50PM     

 

                    Bipolar disorder    Oct 16 2014 11:13AM     

 

                    Hyperlipidemia      Oct 16 2014 11:13AM     

 

                    Anemia, Pernicious    Oct 16 2014 11:13AM     

 

                    Peritoneal Neoplasm, Malignant    Oct 16 2014 11:13AM     

 

                    Screening breast examination    Oct 16 2014 11:13AM     

 

                    Weight loss         Oct 16 2014 11:13AM     

 

                    Anemia, Pernicious    Mar 23 2015  2:57PM     

 

                    B12 deficiency      Mar 23 2015  2:57PM     

 

                    Need for Prevnar vaccine    Mar 23 2015  2:57PM     

 

                    Bipolar disorder    Mar 23 2015  2:57PM     

 

                    Hyperlipidemia      Mar 23 2015  2:57PM     

 

                    Anemia, Pernicious    Mar 23 2015  2:57PM     

 

                    Peritoneal Neoplasm, Malignant    Mar 23 2015  2:57PM     

 

                    B12 deficiency      May  4 2015  4:48PM     

 

                    Hyperlipidemia      May 13 2015  9:56AM     

 

                    Anemia              May 13 2015  9:56AM     

 

                    Bipolar disorder    May 13 2015  9:56AM     

 

                    Bipolar disorder    May 14 2015  3:27PM     

 

                    Hyperlipidemia      May 14 2015  3:27PM     

 

                    Anemia, Pernicious    May 14 2015  3:27PM     

 

                    Peritoneal Neoplasm, Malignant    May 14 2015  3:27PM     

 

                    B12 deficiency      2015  2:20PM     

 

                    B12 deficiency      2015 11:34AM     

 

                    B12 deficiency      Aug 18 2015  9:06AM     

 

                    Tinea Corporis      Sep 18 2015  8:54AM     

 

                    B12 deficiency      Sep 18 2015  8:54AM     

 

                    B12 deficiency      2015 10:28AM     

 

                    Herpes zoster without complication    Dec  3 2015  9:52AM     

 

                    B12 deficiency      Dec 23 2015 11:21AM     

 

                    B12 deficiency      2016  4:51PM     

 

                    Vitamin B 12 deficiency    Mar 14 2016  5:35PM     

 

                    B12 deficiency      Mar 15 2016 12:14PM     

 

                    B12 deficiency      May  5 2016 11:30AM     

 

                    Edema               May  5 2016 11:30AM     

 

                    Dermatitis          May  5 2016 11:30AM     

 

                    Edema               May 17 2016  8:38AM     

 

                    Shortness of breath    May 17 2016  8:38AM     

 

                    Bilateral edema of lower extremity    2016  2:06PM     

 

                    B12 deficiency      2016  2:06PM     

 

                    B12 deficiency      2016 11:50AM     

 

                    B12 deficiency      2016 11:20AM     

 

                    Diarrhea            Aug  2 2016  3:13PM     

 

                    B12 deficiency      Aug 24 2016 11:10AM     

 

                    Encounter for screening mammogram for breast cancer    Aug 24 2016 11:44AM     

 

                    B12 deficiency      Sep 28 2016  2:35PM     

 

                    B12 deficiency      Dec 15 2016  2:02PM     

 

                    Dysuria             Dec 29 2016 12:14PM     

 

                    Hematuria           Fidencio  3 2017  1:33PM     

 

                    Constipation by delayed colonic transit    2017  1:52PM     

 

                    Ileus               2017  1:52PM     

 

                    UTI (urinary tract infection)    Fidencio 15 2017  3:39PM     

 

                    Acute cystitis with hematuria    2017 11:07AM     

 

                    B12 deficiency      2017 11:07AM     

 

                    B12 deficiency      2017 11:40AM     

 

                    B12 deficiency      2017  4:07PM     

 

                    Slurred speech      2017  3:07PM     

 

                    Vitamin B12 deficiency    2017  3:07PM     

 

                    Dysphagia, unspecified type    2017  3:07PM     

 

                    Hyperlipidemia      2017  3:07PM     







Payers







           Insurance Name    Company Name    Plan Name    Plan Number    Policy Number    Policy Group

 Number                                 Start Date

 

                    Medicare RHC Medicare RHC              [id number]              N/A

 

                          [employer]    [employer]                 [id number]

                                                    

 

                    Medicare Part A    Medicare - Lab/Xray              [id number]              2006

 

                    Medicare Part B    Medicare [place]              [id number]              2006

 

                          [employer]    [employer]                 50 percent

                                                    2009

 

                    [facility]    [employer]              [id number]              N/A

 

                    Medicare Part A    Medicare Part A              [id number]              N/A

 

                    Medicare Part A    Medicare Part A              [id number]              2006









History of Encounters







                    Visit Date          Visit Type          Provider

 

                    2017           Office visit         

 

                    2017           Office visit        Bhupinder Louise DO

 

                    2017           Nurse visit         Bhupinder Louise DO

 

                    2017           Office visit        Radha Ontiveros APRN

 

                    1/15/2017           Office visit        Aj Tapia NP

 

                    2017            Office visit        eDvin Masterson MD

 

                    2016          [facility]            Devin Masterson MD

 

                    12/15/2016          Nurse visit         Bhupinder Louise DO

 

                    2016           Nurse visit         Bret Forte APRN

 

                    2016           Nurse visit         Bhupinder Louise DO

 

                    2016            Office visit        Bhupinder Louise DO

 

                    2016           Nurse visit         Bhupinder Louise DO

 

                    2016           Office visit        Bret Forte APRFIORELLA

 

                    2016            Office visit        Bhupinder Louise DO

 

                    3/15/2016           Nurse visit         Bhupinder Louise DO

 

                    2016            Nurse visit         Bhupinder Louise DO

 

                    2015          Nurse visit         Bhupinder Louise DO

 

                    12/3/2015           Office visit        Bhupinder Louise DO

 

                    2015          Nurse visit         Bhupinder Louise DO

 

                    2015           Office visit        Bhupinder Louise DO

 

                    2015           Nurse visit         Bhupinder Aspen DO

 

                    2015            Nurse visit         Bhupinder Aspen DO

 

                    2015            Nurse visit         Bhupinder Aspen DO

 

                    2015           Office visit        Bhupinder Aspen DO

 

                    2015            Nurse visit         Bhupinder Aspen DO

 

                    3/23/2015           Office visit        Bhupinder Aspen DO

 

                    10/16/2014          Office visit        Bhupinder Aspen DO

 

                    2014           Nurse visit         Radha Ontiveros APRN

 

                    7/10/2014           Nurse visit         Bhupinder Aspen DO

 

                    2014           Office visit        Bhupinder Aspen DO

 

                    2014           Nurse visit         Bhupinder Aspen DO

 

                    3/6/2014            Nurse visit         Bhupinder Aspen DO

 

                    2014            aYsmine Lopez MD

 

                    2013          Nurse visit         Bhupinder Aspen DO

 

                    2013           Nurse visit         Bhupinder Aspen DO

 

                    2013            Office visit        Bhupinder Aspen DO

 

                    2013            Nurse visit         Bhupinder Aspen DO

 

                    2013            Nurse visit         Bhupinder Aspen DO

 

                    2013            Office visit        Bhupinder Aspen DO

 

                    4/3/2013            Nurse visit         Hbupinder Aspen DO

 

                    2013            Office visit        Bhupinder Aspen DO

 

                    10/16/2012          Nurse visit         Bhupinder Aspen DO

 

                    2012            Nurse visit         Bhupinder Aspen DO

 

                    2012            Voided              Bhupinder Aspen DO

 

                    2012            Nurse visit         Bhupinder Aspen DO

 

                    2012            Nurse visit         Bhupinder Aspen DO

 

                    2012           Nurse visit         Bhupinder Aspen DO

 

                    5/3/2012            Nurse visit         Bhupinder Aspen DO

 

                    2012           Nurse visit         Bhupinder Aspen DO

 

                    2012           Nurse visit         Bhupinder Aspen DO

 

                    2012           Nurse visit         Bhupinder Aspen DO

 

                    2011           Nurse visit         Bhupinder Aspen DO

 

                    10/20/2011          Nurse visit         Bhupinder Aspen DO

 

                    2011           Office visit        Bhupinder Aspen DO

 

                    2011           Nurse visit         Radha Ontiveros APRN

 

                    2011            Office visit        Bhupinder Aspen DO

 

                    2011           Nurse visit         Bhupinder Aspen DO

 

                    2011            Office visit        Bhupinder Aspen DO

 

                    2011           Office visit        Bhupinder Louise DO

 

                    5/10/2011           Office visit        Bhupinder Louise DO

 

                    2011           Office visit        Bhupinder Louise DO

 

                    4/15/2011           Office visit        Devin Angel DO

 

                    2011           Office visit        Devin Angel DO

 

                    10/15/2010          Office visit        Devin Angel DO

 

                    2010           Office visit        Devin Angel DO

 

                    2010            Office visit        Devin Angel DO

 

                    2010           Office visit        Devin Angel DO

 

                    2010            Office visit        Devin Angel DO

 

                    3/8/2010            Office visit        Devin Masterson MD

 

                    2010            Surgery             Devin Masterson MD

 

                    2010            Office visit        Devin Angel DO

 

                    2010           Surgery             Devin Masterson MD

 

                    2010           Hospital            Devin Masterson MD

 

                    2010           Hospital            Devin Masterson MD

 

                    10/22/2009          Office visit        Devin Angel DO

## 2019-06-26 NOTE — XMS REPORT
MU2 Ambulatory Summary

                             Created on: 2019



Pauline Gan

External Reference #: [id number]

: 1950

Sex: Female



Demographics







                          Address                   [address] [place]  [address]

 

                          Home Phone                [phone]

 

                          Preferred Language        English

 

                          Marital Status            

 

                          Restorationism Affiliation     Unknown

 

                          Race                      White

 

                          Ethnic Group              Not  or 





Author







                          Author                    Bhupinder Louise

 

                          [facility]

 

                          Address                   [address]

[place]  [id number]



 

                          Phone                     [phone]







Care Team Providers







                    Care Team Member Name    Role                Phone

 

                    Bhupinder Louise    PCP                 [phone]

 

                    Bhupinder Louise    PreferredProvider    [phone]







Allergies and Adverse Reactions







                    Name                Reaction            Notes

 

                    NO KNOWN DRUG ALLERGIES                         







Plan of Treatment







             Planned Activity    Comments     Planned Date    Planned Time    Plan/Goal

 

             Injection,Subcutaneous/Intramuscul, [facility]                 2017    12:00 AM      







Medications







                                        Active 

 

             Name         Start Date    Estimated Completion Date    SIG          Comments

 

             pravastatin 40 mg oral tablet    3/30/2015                 TAKE 1 TABLET BY MOUTH DAILY     

 

                Namenda XR 28 mg oral capsule,sprinkle,ER 24hr    2016                       take 1 capsule (28

 mg) by oral route once daily            

 

             pravastatin 40 mg oral tablet    2016                 TAKE 1 TABLET BY MOUTH DAILY     

 

                Vitamin B-12 1,000 mcg/mL injection solution    2016                       inject 1 milliliter 

(1,000 mcg) by intramuscular route once a month     

 

                Namenda XR 28 mg oral capsule,sprinkle,ER 24hr    2016                      TAKE 1 CAPSULE BY

 MOUTH EVERY DAY                         

 

                aspirin 81 mg oral tablet,delayed release (DR/EC)    2017                       TAKE 1 TABLET BY

 MOUTH EVERY DAY                         

 

             MULTI-VITAMINS    2017                 TAKE 1 TABLET BY MOUTH EVERY DAY     

 

                Namenda XR 28 mg oral capsule,sprinkle,ER 24hr                                    take 1 capsule (28 mg) by 

oral route once daily                    

 

                bisacodyl 5 mg oral tablet,delayed release (DR/EC)    2018                       take 3 tablets

 by oral route daily                    Mediaction adjusted 18

 

                simvastatin 20 mg oral tablet    2018                       TAKE 1 TABLET BY MOUTH AT BEDTIME FOR

 30 DAYS                                 

 

             levothyroxine 25 mcg oral tablet    10/22/2018                 TAKE 1 TABLET BY MOUTH DAILY     



 

                famotidine 20 mg oral tablet    10/25/2018                      TAKE 1 TABLET BY MOUTH TWICE DAILY 

                                         

 

             sertraline 100 mg oral tablet    2018                 TAKE 1 TABLET BY MOUTH EVERY DAY    

 

 

                memantine 28 mg oral capsule,sprinkle,ER 24hr    2019                       TAKE 1 CAPSULE BY MOUTH

 DAILY                                   

 

                carbamazepine 200 mg oral tablet    2019                        THEN TAKE ONE TABLET BY MOUTH BEFORE

 BREAKFAST THEN TAKE 2 TABLETS AT BEDTIME     

 

                rivastigmine tartrate 1.5 mg oral capsule    2019                        TAKE 1 CAPSULE BY MOUTH 

TWICE DAILY BEFORE MEALS FOR 30 DAYS     

 

             quetiapine 25 mg oral tablet    3/11/2019                 TAKE 1 TABLET BY MOUTH AT BEDTIME     



 

             benztropine 1 mg oral tablet    3/11/2019                 TAKE 1 TABLET IN THE MORNING     

 

                Gas Relief Extra Strength 125 mg oral tablet,chewable    4/3/2019                        chew 1 tablet

 by oral route 3 times a day as needed     

 

                Tessalon Perles 100 mg oral capsule    4/3/2019                        take 1 capsule (100 mg) by oral

 route 3 times per day as needed for cough     

 

             mirtazapine 15 mg oral tablet    2019                  TAKE 1 TABLET BY MOUTH AT BEDTIME     











                                         

 

             Name         Start Date    Expiration Date    SIG          Comments

 

             Reglan 10mg    3/29/2010    2010    one ac and hs     

 

                Keflex 500 mg oral capsule    2010       10/1/2010       take 1 capsule (500 mg) by oral

 route every 6 hours for 10 days         

 

                Bactrim -160 mg oral tablet    2011       take 1 tablet by oral route

 every 12 hours for 7 days               

 

                triamcinolone acetonide 0.1 % topical cream    2011      apply a thin

 layer to the affected area(s) by topical route 2 times per day     

 

                ergocalciferol (vitamin D2) 50,000 unit oral capsule    4/15/2013       2013       TAKE

 ONE CAPSULE BY MOUTH ONCE A WEEK        

 

                CYANOCOBALAM 1000MCGINJ 1000 milliliter    2013       INJECT 1ML INTRAMUSCULAR

 ONCE A MONTH                            

 

                pravastatin 40 mg oral tablet    3/25/2014       3/20/2015       TAKE ONE TABLET BY MOUTH EVERY

 DAY                                     

 

                          Zostavax (PF) 19,400 unit/0.65 mL subcutaneous suspension for reconstitution    3/23/2015

                    3/24/2015           inject 0.65 milliliter by subcutaneous route once     

 

                famciclovir 500 mg oral tablet    12/3/2015       12/10/2015      take 1 tablet (500 mg) by

 oral route every 8 hours for 7 days     

 

                Cipro 500 mg oral tablet    2016       take 1 tablet (500 mg) by oral route

 2 times per day for 5 days              

 

                Bactrim -160 mg oral tablet    2016        take 1 tablet by oral route

 every 12 hours for 7 days               

 

                Macrobid 100 mg oral capsule    2017       take 1 capsule (100 mg) by oral

 route 2 times per day with food for 7 days     

 

                Augmentin 875-125 mg oral tablet    2017       take 1 tablet by oral route

 every 12 hours for 7 days               

 

                amoxicillin 500 mg oral tablet    2017       take 1 tablet (500 mg) by oral

 route every 12 hours for 7 days         

 

                Namenda XR 28 mg oral capsule,sprinkle,ER 24hr    2017                       TAKE 1 CAPSULE BY 

MOUTH EVERY DAY                         Taking Generic

 

                ciprofloxacin HCl 500 mg oral tablet    2017       take 1 tablet (500 mg)

 by oral route every 12 hours for 5 days     

 

             pravastatin 40 mg oral tablet    2017                 TAKE 1 TABLET BY MOUTH DAILY    Taking

 Simvastatin

 

                cefuroxime axetil 500 mg oral tablet    2017        take 1 tablet (500 mg)

 by oral route 2 times per day for 10 days     

 

                clindamycin HCl 150 mg oral capsule    2018       take 1 capsule (150 mg)

 by oral route 2 times per day for 7 days     









                                        Discontinued 

 

             Name         Start Date    Discontinued Date    SIG          Comments

 

                Tylenol 325 mg oral tablet                    2013        take 1 - 2 tablets (325 -650 mg) by oral

 route every 4-6 hours as needed         

 

                Calcium 600 + D(3) 600 mg(1,500mg) -400 unit oral tablet                    2011       take 1 tablet

 by oral route 2 times a day            no longer taking

 

                Vitamin B-12 1,000 mcg oral tablet extended release    2010       take 1

 tablet by oral route daily             no longer taking

 

                Antifungal (clotrimazole) 1 % topical cream    2010       apply to the 

affected and surrounding areas of skin by topical route 2 times per day morning 
and evening                              

 

                sertraline 100 mg oral tablet    5/10/2011       2011       take 2 tablets (200 mg) by 

oral route once daily                   discontinued by Dr. Serrano

 

                mirtazapine 15 mg oral tablet                    2011        take 1 tablet (15 mg) by oral route 

once daily before bedtime               dc'd by Dr. Serrano

 

                Pristiq 50 mg oral tablet extended release 24 hr                    2013        take 1 tablet (50

 mg) by oral route once daily           dose updated

 

                Vitamin B-12 1,000 mcg/mL injection solution    2011        inject 1 milliliter

 (1,000 mcg) by intramuscular route once a month    on list already

 

                clotrimazole 1 % topical cream    2011        apply to the affected and surrounding

 areas of skin by topical route 2 times per day in the morning and evening     

 

                Vitamin D2 50,000 unit oral capsule    2011        take 1 capsule (50,000

 unit) by oral route once weekly        generic on list

 

                Pravachol 40 mg oral tablet    2012        take 1 tablet (40 mg) by oral 

route once daily for 90 days            generic on list

 

                lithium carbonate 300 mg oral capsule    2012        take 1 capsule by oral

 route daily                            dose updated

 

                Pristiq 100 mg oral tablet extended release 24 hr                    4/10/2012       take 1 and 1/2 

tablet (150 mg) by oral route once daily    Discontinued by Dr Efrain [facility] at [facility]

 

                hydroxyzine HCl 50 mg oral tablet    10/16/2014      2015       take 1 tablet (50 mg) 

by oral route at bedtime                 

 

                Latuda 20 mg oral tablet                    2018       take 1 tablet (20 mg) by oral route once

 daily with food (at least 350 calories)     

 

                lithium carbonate 300 mg oral capsule    2015       take 1 capsule (300

 mg) by oral route 2 for 30 days         

 

                fluconazole 100 mg oral tablet    2015       12/3/2015       take 1 tablet (100 mg) by 

oral route once a week                   

 

                ketoconazole 2 % topical cream    2015       12/3/2015       apply to the affected area(s)

 by topical route 2 times per day        

 

                prednisone 10 mg oral tablet    12/3/2015       2016        take 2 tablets (20 mg) by oral

 route once daily for 4 days 1 tablet daily for 4 days 0.5 tablet daily for 4 
days                                     

 

                triamcinolone acetonide 0.1 % topical cream    2016       apply a thin layer

 to the affected area(s) by topical route 2 times per day     

 

                furosemide 40 mg oral tablet    2016                      take 1 tablet (40 mg) by oral route

 once daily                              

 

                metoclopramide HCl 10 mg oral tablet    2017                        take 1 tablet by oral route 2

 times a day for 50 days                 

 

                Cipro 500 mg oral tablet    1/15/2017       2017       take 1 tablet (500 mg) by oral route

 every 12 hours for 10 days              

 

                mirtazapine 45 mg oral tablet                    2018       take 1 tablet (45 mg) by oral route

 once daily before bedtime               

 

                Fish Oil 300-1,000 mg oral capsule                    2018       take 1 capsule by oral route daily

                                         

 

                Vitamin D3 1,000 unit oral tablet                    2018       take 1 tablet by oral route daily

                                         

 

                Calcium 600 600 mg calcium (1,500 mg) oral tablet                    2018       take 1 tablet by

 oral route daily                        

 

                Aspirin Low Dose 81 mg oral tablet,delayed release (DR/EC)                    2018       take 1

 tablet (81 mg) by oral route once daily     

 

                metoclopramide HCl 10 mg oral tablet    2017                       take 1 tablet by oral route 

2 times a day for 50 days                

 

                furosemide 40 mg oral tablet    2017       TAKE 1 TABLET BY MOUTH DAILY

                                         

 

                Linzess 72 mcg oral capsule    2017       take 1 capsule (72 mcg) by oral

 route once daily on an empty stomach at least 30 minutes before 1st meal of the
day                                      

 

                metoclopramide HCl 10 mg oral tablet    2017       TAKE 1 TABLET BY ORAL

 ROUTE 2 TIMES A DAY FOR 50 DAYS         

 

                potassium chloride 10 mEq oral tablet extended release    2017       TAKE

 2 TABLETs BY MOUTH twice DAILY for one week     

 

                BISACODYL 5MG PV (BOX OF 25)    2017       TAKE 1 TABLET BY MOUTH EVERY

 DAY                                    dose updated

 

                memantine 10 mg oral tablet    2018       TAKE 1 TABLET BY MOUTH TWICE 

DAILY for 30 days                       dose updagted







Problem List







                    Description         Status              Onset

 

                    Artificial opening status; colostomy    Active               

 

                    Bipolar disorder, unspecified    Active               

 

                    Hyperlipidemia      Active               

 

                    Peritoneal Neoplasm, Malignant    Active               

 

                    Anemia, Pernicious    Active               

 

                    Arthritis unspecified    Active               

 

                    B12 deficiency      Active               







Vital Signs







      Date    Time    BP-Sys(mm[Hg]    BP-Lynn(mm[Hg])    HR(bpm)    RR(rpm)    Temp    WT    HT    HC    BMI

                    BSA                 BMI Percentile      O2 Sat(%)

 

       4/3/2019    10:21:00 AM    132 mmHg    60 mmHg    77 bpm    18 rpm    99 F    143 lbs    69 in    

                21.1172 kg/m    1.777 m                       98 %

 

        2018    9:32:00 AM    124 mmHg    80 mmHg    65 bpm    20 rpm    97 F    136.5 lbs    69 in

                          20.16 kg/m2    1.74 m2                   98 %

 

        2018    2:36:00 PM    134 mmHg    70 mmHg    68 bpm    22 rpm    97.9 F    142 lbs    69 in

                          20.9695 kg/m    1.7708 m                 98 %

 

        2017    1:34:00 PM    118 mmHg    62 mmHg    122 bpm    18 rpm    97.8 F    161.375 lbs    

69 in                     23.83 kg/m2    1.89 m2                   96 %

 

        2017    3:05:00 PM    134 mmHg    70 mmHg    70 bpm    20 rpm    97.4 F    181 lbs    69 in

                          26.7288 kg/m    1.9992 m                 98 %

 

        2017    11:07:00 AM    124 mmHg    64 mmHg    62 bpm    17 rpm    98.2 F    181.2 lbs    69

 in                       26.76 kg/m2    2.00 m2                   98 %

 

        1/15/2017    3:34:00 PM    148 mmHg    89 mmHg    69 bpm    20 rpm    98.2 F    179 lbs    69 in

                          26.4334 kg/m    1.99 m2                   98 %

 

       2017    1:51:00 PM    160 mmHg    90 mmHg    100 bpm    20 rpm    96.5 F    179 lbs             

                                                                98 %

 

       2016    3:11:00 PM    134 mmHg    76 mmHg    80 bpm    20 rpm    98 F    163 lbs    69 in     

                24.07 kg/m2     1.90 m2                         98 %

 

        2016    2:04:00 PM    142 mmHg    86 mmHg    68 bpm    16 rpm    98.5 F    166 lbs    63 in

                          29.4053 kg/m    1.8295 m                 100 %

 

        2016    11:27:00 AM    148 mmHg    78 mmHg    90 bpm    20 rpm    98.2 F    153 lbs    69 in

                          22.59 kg/m2    1.84 m2                   96 %

 

        12/3/2015    9:50:00 AM    132 mmHg    70 mmHg    62 bpm    16 rpm    97.9 F    145 lbs    69 in

                          21.4125 kg/m    1.7894 m                 100 %

 

        2015    8:52:00 AM    132 mmHg    68 mmHg    52 bpm    20 rpm    97.8 F    141 lbs    69 in

                          20.82 kg/m2    1.76 m2                   100 %

 

        2015    3:25:00 PM    120 mmHg    62 mmHg    72 bpm    16 rpm    98.1 F    136 lbs    69 in

                          20.0835 kg/m    1.733 m                 98 %

 

       3/23/2015    2:55:00 PM    130 mmHg    76 mmHg    68 bpm    18 rpm    97 F    140 lbs    69 in    

                20.67 kg/m2     1.76 m2                         98 %

 

        10/16/2014    11:11:00 AM    120 mmHg    66 mmHg    77 bpm    20 rpm    98 F    130 lbs    69 in

                          19.1974 kg/m    1.6943 m                 100 %

 

        2014    3:21:00 PM    130 mmHg    66 mmHg    63 bpm    18 rpm    97.2 F    160 lbs    69 in

                          23.63 kg/m2    1.88 m2                   99 %

 

        2013    10:35:00 AM    132 mmHg    70 mmHg    66 bpm    20 rpm    98.1 F    157 lbs    69 in

                          23.1846 kg/m    1.862 m                  

 

        2013    1:29:00 PM    132 mmHg    70 mmHg    76 bpm    18 rpm    98.2 F    166 lbs    69 in 

                          24.51 kg/m2    1.91 m2                    

 

       2013    2:46:00 PM    128 mmHg    70 mmHg    76 bpm    16 rpm    98 F    160 lbs    69 in     

                23.6276 kg/m    1.8797 m                       

 

        2011    8:49:00 AM    128 mmHg    78 mmHg    70 bpm    18 rpm    97.9 F    164 lbs    69 in

                          24.22 kg/m2    1.90 m2                    

 

     2011    1:31:00 PM    132 mmHg    68 mmHg    84 bpm         97 F    167 lbs                        

                                         

 

        2011    9:09:00 AM    128 mmHg    70 mmHg    72 bpm    18 rpm    98.2 F    163 lbs    64 in 

                          27.9786 kg/m    1.8272 m                  

 

       2011    10:01:00 AM    132 mmHg    70 mmHg    72 bpm    18 rpm    98.2 F    154 lbs             

                                                                 

 

       2011    2:47:00 PM    128 mmHg    70 mmHg    72 bpm    18 rpm    97.8 F    156 lbs             

                                                                 

 

       5/10/2011    3:16:00 PM    144 mmHg    80 mmHg    72 bpm    18 rpm    98.2 F    158 lbs             

                                                                 

 

        2011    10:11:00 AM    132 mmHg    70 mmHg    70 bpm    18 rpm    98.2 F    168 lbs    69 in

                          24.809 kg/m    1.9261 m                  

 

        4/15/2011    10:52:00 AM    110 mmHg    60 mmHg    75 bpm    16 rpm    97.5 F    172.375 lbs    

69 in                     25.46 kg/m2    1.95 m2                   100 %

 

        2011    11:43:00 AM    120 mmHg    82 mmHg    75 bpm    16 rpm    97.2 F    178.5 lbs    69

 in                       26.3596 kg/m    1.9854 m                 100 %

 

        10/15/2010    1:32:00 PM    120 mmHg    70 mmHg    80 bpm    18 rpm    96.6 F    177 lbs    69 in

                          26.14 kg/m2    1.98 m2                   100 %

 

        2010    3:50:00 PM    168 mmHg    100 mmHg    82 bpm    18 rpm    97.8 F    177.5 lbs    69

 in                       26.2119 kg/m    1.9798 m                 97 %

 

        2010    1:21:00 PM    140 mmHg    80 mmHg    59 bpm    16 rpm    97.6 F    173.25 lbs    69 

in                        25.58 kg/m2    1.96 m2                   100 %

 

        2010    3:02:00 PM    140 mmHg    80 mmHg    61 bpm    16 rpm    97.6 F    173.125 lbs    69

 in                       25.5658 kg/m    1.9553 m                 99 %

 

        2010    1:23:00 PM    130 mmHg    80 mmHg    66 bpm    16 rpm    96.8 F    173 lbs    69 in 

                          25.55 kg/m2    1.95 m2                   100 %

 

        2010    12:58:00 PM    130 mmHg    88 mmHg    75 bpm    16 rpm    98.4 F    172.25 lbs    69

 in                       25.4366 kg/m    1.9503 m                 100 %







Social History







                    Name                Description         Comments

 

                    denies alcohol use                         

 

                    denies smoking                           

 

                    Denies illicit substance abuse                         

 

                    retired                                 direct care

 

                    Single                                   

 

                    Exercises regularly                         

 

                    Attended some college                         

 

                    Tobacco             Never smoker         







History of Procedures







                    Date Ordered        Description         Order Status

 

                    2010 12:00 AM    COMPREHEN METABOLIC PANEL    Reviewed

 

                    2010 12:00 AM    COMPLETE CBC W/AUTO DIFF WBC    Reviewed

 

                    2010 12:00 AM    LIPID PANEL         Reviewed

 

                          2015 12:00 AM        B12 Injection, Up to 1000 Mcg NDC#8491-4283-60 [facility] Medicare 

                                        Reviewed

 

                    2011 12:00 AM    MAMMOGRAM SCREENING    Reviewed

 

                    2011 12:00 AM    CYTOPATH C/V THIN LAYER    Reviewed

 

                    2011 12:00 AM    B12 Injection 1 cc NDC#[address]-78    Reviewed

 

                    2015 12:00 AM    THER/PROPH/DIAG INJ SC/IM    Reviewed

 

                    2015 12:00 AM    B12 Injection, Up to 1000 Mcg NDC#0671-8353-13    Reviewed

 

                    2011 12:00 AM    THER/PROPH/DIAG INJ SC/IM    Reviewed

 

                    2011 12:00 AM    B12 Injection([facility]) Ndc#0249-1225-48-    Reviewed

 

                    2015 12:00 AM    THER/PROPH/DIAG INJ SC/IM    Reviewed

 

                    2015 12:00 AM    B12 Injection, Up to 1000 Mcg NDC#5117-8275-40    Reviewed

 

                    10/20/2011 12:00 AM    THER/PROPH/DIAG INJ SC/IM    Reviewed

 

                    10/20/2011 12:00 AM    B12 Injection([place]) Ndc#1800-7341-66-    Reviewed

 

                    2016 12:00 AM    THER/PROPH/DIAG INJ SC/IM    Reviewed

 

                    2016 12:00 AM    B12 Injection, Up to 1000 Mcg NDC#6640-0834-01    Reviewed

 

                    3/14/2016 12:00 AM    VITAMIN B-12        Reviewed

 

                    3/15/2016 12:00 AM    THER/PROPH/DIAG INJ SC/IM    Reviewed

 

                    3/15/2016 12:00 AM    B12 Injection, Up to 1000 Mcg NDC#6275-4200-67    Reviewed

 

                    2011 12:00 AM    ***Immunization administration, Medicare flu    Reviewed

 

                    2011 12:00 AM    Fluzone ** MEDICARE Only **    Reviewed

 

                    2011 12:00 AM    THER/PROPH/DIAG INJ SC/IM    Reviewed

 

                    2011 12:00 AM    B12 Injection ([employer] [facility]) Ndc#7259-1639-19    Reviewed

 

                    2016 12:00 AM    B12 Injection, Up to 1000 Mcg NDC#0241-9017-94 [facility]    

Reviewed

 

                    2016 12:00 AM    TTE W/DOPPLER COMPLETE    Reviewed

 

                    2016 12:00 AM    EXTREMITY STUDY     Reviewed

 

                          2016 12:00 AM        B12 Injection, Up to 1000 Mcg NDC#3534-7010-38 [facility] Medicare 

                                        Reviewed

 

                    2016 12:00 AM    THER/PROPH/DIAG INJ SC/IM    Reviewed

 

                    2016 12:00 AM    B12 Injection, Up to 1000 Mcg NDC#1733-9796-77    Reviewed

 

                    2016 12:00 AM    THER/PROPH/DIAG INJ SC/IM    Reviewed

 

                    2012 12:00 AM    B12 Injection([facility]) Ndc#2350-8351-98-    Reviewed

 

                    2016 12:00 AM    B12 Injection, Up to 1000 Mcg NDC#4557-8519-04    Reviewed

 

                    2016 12:00 AM    THER/PROPH/DIAG INJ SC/IM    Reviewed

 

                    2012 12:00 AM    THER/PROPH/DIAG INJ SC/IM    Reviewed

 

                    2012 12:00 AM    B12 Injection ([employer]) Ndc#1737-3903-35    Reviewed

 

                    2016 12:00 AM    THER/PROPH/DIAG INJ SC/IM    Reviewed

 

                    2016 12:00 AM    B12 Injection, Up to 1000 Mcg NDC#0214-9683-76    Reviewed

 

                    2016 12:00 AM    B12 Injection, Up to 1000 Mcg NDC#1526-0261-41    Reviewed

 

                    2016 12:00 AM    THER/PROPH/DIAG INJ SC/IM    Reviewed

 

                    2012 12:00 AM    THER/PROPH/DIAG INJ SC/IM    Reviewed

 

                    2012 12:00 AM    B12 Injection([facility]) Ndc#8509-0118-96-    Reviewed

 

                    12/15/2016 12:00 AM    B12 Injection, Up to 1000 Mcg NDC#3039-3304-34    Reviewed

 

                    12/15/2016 12:00 AM    THER/PROPH/DIAG INJ SC/IM    Reviewed

 

                    2016 12:00 AM    URNLS DIP STICK/TABLET RGNT AUTO W/O MICROSCOPY    Reviewed

 

                    1/3/2017 12:00 AM    URNLS DIP STICK/TABLET RGNT AUTO W/O MICROSCOPY    Reviewed

 

                    2017 12:00 AM    URINE CULTURE/COLONY COUNT    Reviewed

 

                    2017 12:00 AM    Rocephin 1 gram Injection, [facility] Medicare    Reviewed

 

                    2017 12:00 AM    THERAPEUTIC PROPHYLACTIC/DX INJECTION SUBQ/IM    Reviewed

 

                    2017 12:00 AM    B12 1000mcg Injection, [facility] Medicare    Reviewed

 

                    5/3/2012 12:00 AM    THER/PROPH/DIAG INJ SC/IM    Reviewed

 

                    5/3/2012 12:00 AM    B12 Injection([place]) Ndc#3154-6639-62-    Reviewed

 

                    2017 12:00 AM    THERAPEUTIC PROPHYLACTIC/DX INJECTION SUBQ/IM    Reviewed

 

                    2017 12:00 AM    B12 1000mcg Injection, [facility] Medicare    Reviewed

 

                    2017 12:00 AM    MRI BRAIN STEM W/O & W/DYE    Reviewed

 

                    2017 12:00 AM    VITAMIN B-12        Reviewed

 

                    2017 12:00 AM    Speech Therapy Consult    Reviewed

 

                    2017 12:00 AM    ASSAY OF CREATININE    Reviewed

 

                    2012 12:00 AM    IMMUNOTHERAPY INJECTIONS    Reviewed

 

                    2012 12:00 AM    B12 Injection([facility]) Ndc#4988-2692-66-    Reviewed

 

                    2017 12:00 AM    URINALYSIS AUTO W/SCOPE    Reviewed

 

                    2012 12:00 AM    THER/PROPH/DIAG INJ SC/IM    Reviewed

 

                    2012 12:00 AM    B12 Injection, Up to 1000 Mcg NDC#1384-9760-71    Reviewed

 

                    2017 12:00 AM    URINALYSIS AUTO W/SCOPE    Reviewed

 

                    2017 2:18 PM    URINALYSIS AUTO W/O SCOPE    Reviewed

 

                    2017 12:00 AM    URINE CULTURE/COLONY COUNT    Reviewed

 

                    2017 12:00 AM    B12 1000mcg Injection    Reviewed

 

                    2017 12:00 AM    URNLS DIP STICK/TABLET RGNT AUTO W/O MICROSCOPY    Reviewed

 

                    2017 12:00 AM    METABOLIC PANEL TOTAL CA    Reviewed

 

                    2017 12:00 AM    URNLS DIP STICK/TABLET RGNT AUTO W/O MICROSCOPY    Reviewed

 

                    2012 12:00 AM    THER/PROPH/DIAG INJ SC/IM    Reviewed

 

                    2012 12:00 AM    B12 Injection, Up to 1000 Mcg NDC#4543-7300-07    Reviewed

 

                    2012 12:00 AM    THER/PROPH/DIAG INJ SC/IM    Reviewed

 

                    2012 12:00 AM    B12 Injection, Up to 1000 Mcg NDC#2780-1942-51    Reviewed

 

                    2017 12:00 AM    ASSAY CARBAMAZEPINE TOTAL    Reviewed

 

                    2017 12:00 AM    AUTOMATED DIFF WBC COUNT    Reviewed

 

                    2017 12:00 AM    COMPREHEN METABOLIC PANEL    Reviewed

 

                    2017 12:00 AM    MANUAL RETICULOCYTE COUNT    Reviewed

 

                    2017 12:00 AM    VITAMIN B-12        Reviewed

 

                    2017 12:00 AM    ASSAY OF FOLIC ACID SERUM    Reviewed

 

                    10/16/2012 12:00 AM    THER/PROPH/DIAG INJ SC/IM    Reviewed

 

                    10/16/2012 12:00 AM    B12 Injection, Up to 1000 Mcg NDC#9303-3569-99    Reviewed

 

                    2010 12:00 AM    COMPREHEN METABOLIC PANEL    Reviewed

 

                    2010 12:00 AM    COMPLETE CBC W/AUTO DIFF WBC    Reviewed

 

                    2010 12:00 AM    LIPID PANEL         Reviewed

 

                    2013 12:00 AM    Flu Injection 3 Years And Above NDC# [address]-35  [facility]    Reviewed



 

                    2013 12:00 AM    COMPLETE CBC W/AUTO DIFF WBC    Reviewed

 

                    2013 12:00 AM    ASSAY OF LITHIUM    Reviewed

 

                    2013 12:00 AM    METABOLIC PANEL TOTAL CA    Reviewed

 

                    4/3/2013 12:00 AM    THER/PROPH/DIAG INJ SC/IM    Reviewed

 

                    4/3/2013 12:00 AM    B12 Injection, Up to 1000 Mcg NDC#1287-8748-59    Reviewed

 

                    2013 12:00 AM    THER/PROPH/DIAG INJ SC/IM    Reviewed

 

                    2013 12:00 AM    B12 Injection, Up to 1000 Mcg NDC#6124-1861-97    Reviewed

 

                    2013 12:00 AM    THER/PROPH/DIAG INJ SC/IM    Reviewed

 

                    2013 12:00 AM    B12 Injection, Up to 1000 Mcg NDC#6982-8502-09    Reviewed

 

                    2013 12:00 AM    LIPID PANEL         Reviewed

 

                    2013 12:00 AM    VITAMIN D 25 HYDROXY    Reviewed

 

                    2013 12:00 AM    THER/PROPH/DIAG INJ SC/IM    Reviewed

 

                    2013 12:00 AM    B12 Injection, Up to 1000 Mcg NDC#3191-7285-66    Reviewed

 

                    2013 12:00 AM    THER/PROPH/DIAG INJ SC/IM    Reviewed

 

                    3/6/2014 12:00 AM    THER/PROPH/DIAG INJ SC/IM    Reviewed

 

                    2014 12:00 AM    THER/PROPH/DIAG INJ SC/IM    Reviewed

 

                    2014 12:00 AM    B12 Injection, Up to 1000 Mcg NDC#5197-9893-58    Reviewed

 

                    2010 12:00 AM    SKIN FUNGI CULTURE    Reviewed

 

                    10/9/2010 12:00 AM    COMPREHEN METABOLIC PANEL    Reviewed

 

                    10/9/2010 12:00 AM    LIPID PANEL         Reviewed

 

                    2010 12:00 AM    THER/PROPH/DIAG INJ SC/IM    Reviewed

 

                    2010 12:00 AM    B12 Injection Ndc#[address]-70 ([facility])    Reviewed

 

                    2010 12:00 AM    THER/PROPH/DIAG INJ SC/IM    Reviewed

 

                    2010 12:00 AM    Kenalog 40 Mg Im-Ndc#[address]-51 ([facility])    Reviewed

 

                    10/15/2010 12:00 AM    FLU VACCINE 3 YRS & > IM    Reviewed

 

                    10/15/2010 12:00 AM    Admin.Of M/C Cov.Vaccine-Flu Vacc.    Reviewed

 

                    1/15/2011 12:00 AM    COMPLETE CBC W/AUTO DIFF WBC    Reviewed

 

                    1/15/2011 12:00 AM    COMPREHEN METABOLIC PANEL    Reviewed

 

                    1/15/2011 12:00 AM    LIPID PANEL         Reviewed

 

                    2014 12:00 AM    MAMMOGRAM SCREENING    Reviewed

 

                    2014 12:00 AM    Screening mammography, bilateral    Reviewed

 

                    7/10/2014 12:00 AM    THER/PROPH/DIAG INJ SC/IM    Reviewed

 

                    7/10/2014 12:00 AM    B12 Injection, Up to 1000 Mcg NDC#8570-1535-07    Reviewed

 

                    2011 12:00 AM    COMPLETE CBC W/AUTO DIFF WBC    Reviewed

 

                    2011 12:00 AM    COMPREHEN METABOLIC PANEL    Reviewed

 

                    2011 12:00 AM    LIPID PANEL         Reviewed

 

                    2014 12:00 AM    B12 Injection, Up to 1000 Mcg NDC#2768-7716-59    Reviewed

 

                    10/19/2014 12:00 AM    MAMMOGRAM SCREENING    Reviewed

 

                    10/19/2014 12:00 AM    Screening mammography, bilateral    Reviewed

 

                    10/16/2014 12:00 AM    B12 Injection, Up to 1000 Mcg NDC#3293-0868-67    Reviewed

 

                    10/16/2014 12:00 AM    COMPLETE CBC W/AUTO DIFF WBC    Reviewed

 

                    10/16/2014 12:00 AM    COMPREHEN METABOLIC PANEL    Reviewed

 

                    10/16/2014 12:00 AM    IMMUNOASSAY TUMOR     Reviewed

 

                    10/16/2014 12:00 AM    LIPID PANEL         Reviewed

 

                    10/16/2014 12:00 AM    ASSAY OF LITHIUM    Reviewed

 

                    10/16/2014 12:00 AM    MAMMOGRAM SCREENING    Reviewed

 

                    2011 12:00 AM    ASSAY OF PARATHORMONE    Reviewed

 

                    2011 12:00 AM    VITAMIN D 25 HYDROXY    Reviewed

 

                    2011 12:00 AM    ASSAY OF LITHIUM    Reviewed

 

                    2011 12:00 AM    METABOLIC PANEL TOTAL CA    Reviewed

 

                    2011 12:00 AM    CT HEAD/BRAIN W/O & W/DYE    Reviewed

 

                    3/23/2015 12:00 AM    PNEUMOCOCCAL VACC 13 GLENDY IM    Reviewed

 

                    3/23/2015 12:00 AM    Vitamin B12 injection    Reviewed

 

                    2011 12:00 AM    ASSAY OF LITHIUM    Reviewed

 

                    2011 12:00 AM    B12 Injection Ndc#[address]-57  [place]    Reviewed

 

                    2015 12:00 AM    THER/PROPH/DIAG INJ SC/IM    Reviewed

 

                    2015 12:00 AM    B12 Injection, Up to 1000 Mcg NDC#2267-8076-12    Reviewed

 

                    2015 12:00 AM    COMPLETE CBC W/AUTO DIFF WBC    Reviewed

 

                    2015 12:00 AM    COMPREHEN METABOLIC PANEL    Reviewed

 

                    2015 12:00 AM    LIPID PANEL         Reviewed

 

                    2015 12:00 AM    ASSAY OF LITHIUM    Reviewed

 

                    2011 12:00 AM    VIT D 1 25-DIHYDROXY    Reviewed

 

                    2011 12:00 AM    VITAMIN B-12        Reviewed

 

                    2015 12:00 AM    B12 Injection, Up to 1000 Mcg NDC#0552-3056-10    Reviewed

 

                    2015 12:00 AM    THER/PROPH/DIAG INJ SC/IM    Reviewed

 

                    2015 12:00 AM    B12 Injection, Up to 1000 Mcg NDC#2123-6301-40    Reviewed

 

                    2011 12:00 AM    THER/PROPH/DIAG INJ SC/IM    Reviewed

 

                    2011 12:00 AM    B12 Injection ([employer]) Ndc#9169-6469-22    Reviewed

 

                    2015 12:00 AM    THER/PROPH/DIAG INJ SC/IM    Reviewed

 

                    2015 12:00 AM    B12 Injection, Up to 1000 Mcg NDC#3184-2295-17    Reviewed







Results Summary







                          Date and Description      Results

 

                          2004 12:00 AM        Colonoscopy-Women and Men over 50 Normal 

 

                          2008 12:00 AM         Pap Smear Declined 

 

                          10/7/2009 12:00 AM        Cholest Cry Stone Ql .0 %LDLc SerPl-mCnc 123.0 mg/dLHDLc

 SerPl-mCnc 34.0 mg/dLTrigl SerPl-mCnc 190.0 mg/dLGlucose SerPl-mCnc 78.0 mg/dL

 

                          2009 12:00 AM        Mammogram -Women over 40 Normal HIV1+2 Ab Ser Ql no risk 

 

                          2010 8:47 AM         Dexa Bone Scan Refused Aspirin reccommended Contraindication 



 

                          2010 8:48 AM         Depression Done 

 

                          2010 12:00 AM         Foot Exam-Diabetic Done 

 

                          2010 12:00 AM         Cholest Cry Stone Ql .0 %LDLc SerPl-mCnc 126.0 mg/dLGlucose

 SerPl-mCnc 102.0 mg/dL

 

                          2010 8:45 AM          TRIGLYCERIDES 122.0 mg/dLCHOLESTEROL 186.0 mg/dLHDL 36.0 mg/dLTOT

 CHOL/HDL 5.2 LDL (CALC) 126.0 mg/dLGLUCOSE 102.0 mg/dLSODIUM 143.0 
mmol/LPOTASSIUM 3.70 mmol/LCHLORIDE 111.0 mmol/LCO2 23.0 mmol/LBUN 10.0 
mg/dLCREATININE 0.80 mg/dLSGOT/AST 12.0 IU/LSGPT/ALT 11.0 IU/LALK PHOS 65.0 
IU/LTOTAL PROTEIN 7.20 g/dLALBUMIN 3.90 g/dLTOTAL BILI 0.50 mg/dLCALCIUM 10.20 
mg/dLAGE 59 GFR NonAA 73 GFR AA 88 eGFR >60 mL/min/1.73 m2eGFR AA* >60 WBC 5.7 
RBC 3.26 HGB 10.60 g/dLHCT 31.70 %MCV 97.0 fLMCH 32.50 pgMCHC 33.40 g/dLRDW SD 
47 RDW CV 13.30 %MPV 9.70 fLPLT 287 NRBC# 0.00 NRBC% 0.0 %NEUT 62.90 %%LYMP 
21.80 %%MONO 9.90 %%EOS 5.0 %%BASO 0.40 %#NEUT 3.56 #LYMP 1.23 #MONO 0.56 #EOS 
0.28 #BASO 0.02 MANUAL DIFF NOT IND 

 

                          2010 12:00 AM        Glucose SerPl-mCnc 96.0 mg/dLCholest Cry Stone Ql .0 %LDLc

 SerPl-mCnc 146.0 mg/dL

 

                          10/6/2010 12:00 AM        Cholest Cry Stone Ql .0 %LDLc SerPl-mCnc 111.0 mg/dLGlucose

 SerPl-mCnc 81.0 mg/dL

 

                          10/6/2010 2:45 PM         TRIGLYCERIDES 123.0 mg/dLCHOLESTEROL 178.0 mg/dLHDL 42.0 mg/dLTOT

 CHOL/HDL 4.2 LDL (CALC) 111.0 mg/dLGLUCOSE 81.0 mg/dLSODIUM 139.0 
mmol/LPOTASSIUM 4.10 mmol/LCHLORIDE 106.0 mmol/LCO2 24.0 mmol/LBUN 13.0 
mg/dLCREATININE 0.90 mg/dLSGOT/AST 13.0 IU/LSGPT/ALT 11.0 IU/LALK PHOS 61.0 
IU/LTOTAL PROTEIN 7.10 g/dLALBUMIN 3.90 g/dLTOTAL BILI 0.30 mg/dLCALCIUM 9.30 
mg/dLAGE 60 GFR NonAA 64 GFR AA 78 eGFR >60 mL/min/1.73 m2eGFR AA* >60 

 

                          2011 12:00 AM         Mammogram -Women over 40 Ordered 

 

                          2011 10:25 AM        TRIGLYCERIDES 111.0 mg/dLCHOLESTEROL 195.0 mg/dLHDL 43.0 mg/dLTOT

 CHOL/HDL 4.5 LDL (CALC) 130.0 mg/dLWBC 5.3 RBC 3.76 HGB 12.0 g/dLHCT 37.80 %MCV
 101.0 fLMCH 31.90 pgMCHC 31.70 g/dLRDW SD 47 RDW CV 13.0 %MPV 9.70 fLPLT 259 
NRBC# 0.00 NRBC% 0.0 %NEUT 69.0 %%LYMP 17.60 %%MONO 8.30 %%EOS 4.70 %%BASO 0.40 
%#NEUT 3.63 #LYMP 0.93 #MONO 0.44 #EOS 0.25 #BASO 0.02 MANUAL DIFF NOT IND 
GLUCOSE 102.0 mg/dLSODIUM 146.0 mmol/LPOTASSIUM 4.20 mmol/LCHLORIDE 113.0 
mmol/LCO2 23.0 mmol/LBUN 15.0 mg/dLCREATININE 1.0 mg/dLSGOT/AST 12.0 
IU/LSGPT/ALT 17.0 IU/LALK PHOS 60.0 IU/LTOTAL PROTEIN 6.90 g/dLALBUMIN 4.20 
g/dLTOTAL BILI 0.40 mg/dLCALCIUM 9.70 mg/dLAGE 60 GFR NonAA 57 GFR AA 69 eGFR 57
 eGFR AA* >60 

 

                          2011 11:49 AM        Cholest Cry Stone Ql .0 %LDLc SerPl-mCnc 130.0 mg/dLHDLc

 SerPl-mCnc 43.0 mg/dLTrigl SerPl-mCnc 111.0 mg/dLGlucose SerPl-mCnc 102.0 mg/dL

 

                          2011 11:52 AM        Pap Smear Declined 

 

                          2011 11:28 AM        Lithium 2.080 mmol/LGLUCOSE 102.0 mg/dLSODIUM 135.0 mmol/LPOTASSIUM

 3.90 mmol/LCHLORIDE 106.0 mmol/LCO2 21.0 mmol/LBUN 12.0 mg/dLCREATININE 1.30 
mg/dLCALCIUM 10.70 mg/dLAGE 60 GFR NonAA 42 GFR AA 51 eGFR 42 eGFR AA* 51 

 

                          2011 8:58 AM          Lithium 0.690 mmol/L

 

                          2011 2:38 PM         VITAMIN B12 3483.0 pg/mL

 

                          2013 3:35 PM          WBC 5.1 RBC 3.73 HGB 11.70 g/dLHCT 36.40 %MCV 98.0 fLMCH 31.40

 pgMCHC 32.10 g/dLRDW SD 47 RDW CV 13.10 %MPV 9.80 fLPLT 224 NRBC# 0.00 NRBC% 
0.0 %NEUT 66.80 %%LYMP 19.10 %%MONO 9.0 %%EOS 4.90 %%BASO 0.20 %#NEUT 3.42 #LYMP
 0.98 #MONO 0.46 #EOS 0.25 #BASO 0.01 MANUAL DIFF NOT IND GLUCOSE 88.0 
mg/dLSODIUM 141.0 mmol/LPOTASSIUM 4.10 mmol/LCHLORIDE 110.0 mmol/LCO2 22.0 
mmol/LBUN 22.0 mg/dLCREATININE 1.10 mg/dLCALCIUM 9.80 mg/dLAGE 62 GFR NonAA 50 
GFR AA 61 eGFR 50 eGFR AA* 60 Lithium 0.760 mmol/L

 

                          2013 11:02 AM        TRIGLYCERIDES 106.0 mg/dLCHOLESTEROL 181.0 mg/dLHDL 46.0 mg/dLTOT

 CHOL/HDL 3.9 LDL (CALC) 114.0 mg/dLVITAMIN D 41.10 ng/mL

 

                          10/17/2014 10:10 AM       WBC 5.0 RBC 3.66 HGB 11.60 g/dLHCT 36.80 %.0 fLMCH 31.70

 pgMCHC 31.50 g/dLRDW SD 50 RDW CV 13.50 %MPV 10.10 fLPLT 209 NRBC# 0.00 NRBC% 
0.0 %NEUT 69.20 %%LYMP 21.0 %%MONO 6.40 %%EOS 3.20 %%BASO 0.20 %#NEUT 3.46 #LYMP
 1.05 #MONO 0.32 #EOS 0.16 #BASO 0.01 MANUAL DIFF NOT IND GLUCOSE 100.0 
mg/dLSODIUM 148.0 mmol/LPOTASSIUM 3.90 mmol/LCHLORIDE 114.0 mmol/LCO2 26.0 
mmol/LBUN 12.0 mg/dLCREATININE 1.20 mg/dLSGOT/AST 9.0 IU/LSGPT/ALT <6 IU/LALK 
PHOS 82.0 IU/LTOTAL PROTEIN 6.90 g/dLALBUMIN 4.0 g/dLTOTAL BILI 0.40 
mg/dLCALCIUM 10.50 mg/dLAGE 64 GFR NonAA 45 GFR AA 55 eGFR 45 eGFR AA* 55 
TRIGLYCERIDES 96.0 mg/dLCHOLESTEROL 155.0 mg/dLHDL 38.0 mg/dLTOT CHOL/HDL 4.1 
LDL (CALC) 98.0 mg/dLLithium 0.850 mmol/LCancer Antigen (CA) 125 8.30 U/mL

 

                          2015 10:25 AM        Lithium 0.790 mmol/LWBC 4.8 RBC 3.44 HGB 11.0 g/dLHCT 35.20 

%.0 fLMCH 32.0 pgMCHC 31.30 g/dLRDW SD 53 RDW CV 14.0 %MPV 9.30 fLPLT 210
 NRBC# 0.00 NRBC% 0.0 %NEUT 70.80 %%LYMP 17.20 %%MONO 8.10 %%EOS 3.50 %%BASO 
0.40 %#NEUT 3.41 #LYMP 0.83 #MONO 0.39 #EOS 0.17 #BASO 0.02 MANUAL DIFF NOT IND 
TRIGLYCERIDES 107.0 mg/dLCHOLESTEROL 174.0 mg/dLHDL 43.0 mg/dLTOT CHOL/HDL 4.0 
LDL (CALC) 110.0 mg/dLGLUCOSE 90.0 mg/dLSODIUM 145.0 mmol/LPOTASSIUM 3.80 
mmol/LCHLORIDE 115.0 mmol/LCO2 24.0 mmol/LBUN 17.0 mg/dLCREATININE 1.30 
mg/dLSGOT/AST 18.0 IU/LSGPT/ALT 17.0 IU/LALK PHOS 56.0 IU/LTOTAL PROTEIN 6.70 
g/dLALBUMIN 3.90 g/dLTOTAL BILI 0.40 mg/dLCALCIUM 9.80 mg/dLAGE 64 GFR NonAA 41 
GFR AA 50 eGFR 41 eGFR AA* 50 

 

                          3/15/2016 8:08 AM         VITAMIN B12 696.0 pg/mL

 

                          2016 2:09 PM        COLOR YELLOW APPEARANCE CLEAR SPEC GRAV 1.010 pH 5.0 PROTEIN

 30 GLUCOSE NEGATIVE mg/dLKETONE NEGATIVE BILIRUBIN NEGATIVE BLOOD LARGE NITRITE
 NEGATIVE [place] SCREEN MODERATE MICRO INDICATED? SEE BELOW WBC/HPF  RBC/HPF
 20-50 CASTS/LPF NEGATIVE /LPFCRYSTALS NEGATIVE MUCOUS THRDS NEGATIVE BACTERIA 
NEGATIVE EPITH CELLS FEW SQUAMOUS /HPFTRICHOMONAS NEGATIVE YEAST NEGATIVE CULT 
SET UP? YES 

 

                          1/3/2017 4:08 PM          COLOR YELLOW APPEARANCE HAZY SPEC GRAV 1.015 pH 6.0 PROTEIN 30

 GLUCOSE NEGATIVE mg/dLKETONE NEGATIVE BILIRUBIN NEGATIVE BLOOD MODERATE NITRITE
 NEGATIVE [place] SCREEN LARGE MICRO INDICATED? SEE BELOW WBC/-200 RBC/HPF 
5-10 CASTS/LPF NEGATIVE /LPFCRYSTALS NEGATIVE MUCOUS THRDS NEGATIVE BACTERIA 
NEGATIVE EPITH CELLS 1+ SQUAMOUS /HPFTRICHOMONAS NEGATIVE YEAST NEGATIVE CULT 
SET UP? YES 

 

                          2017 4:24 PM         CREATININE 1.50 mg/dLAGE 66 GFR NonAA 35 GFR AA 42 eGFR 35 eGFR

 AA* 42 VITAMIN B12 1324.0 pg/mL

 

                          2017 2:45 PM         COLOR YELLOW APPEARANCE CLEAR SPEC GRAV <=1.005 pH 6.0 PROTEIN

 NEGATIVE GLUCOSE NEGATIVE mg/dLKETONE NEGATIVE BILIRUBIN NEGATIVE BLOOD TRACE-
INTACT NITRITE NEGATIVE [place] SCREEN SMALL MICRO INDICATED? SEE BELOW WBC/HPF 0-5
 RBC/HPF NEGATIVE CASTS/LPF NEGATIVE /LPFCRYSTALS NEGATIVE MUCOUS THRDS NEGATIVE
 BACTERIA FEW EPITH CELLS FEW SQUAMOUS /HPFTRICHOMONAS NEGATIVE YEAST NEGATIVE 
CULT SET UP? YES 

 

                          2017 11:22 AM        COLOR YELLOW APPEARANCE CLEAR SPEC GRAV <=1.005 pH 6.5 PROTEIN

 NEGATIVE GLUCOSE NEGATIVE mg/dLKETONE NEGATIVE BILIRUBIN NEGATIVE BLOOD 
NEGATIVE NITRITE NEGATIVE [place] SCREEN NEGATIVE MICRO INDICATED? NOT INDICATED 

 

                          2017 2:18 PM         Clarity Ur cloudy Color Ur dark yellow Glucose Ur-sCnc negative

 Bilirub Ur Ql Strip negative Ketones Ur Ql Strip negative Sp Gr Ur Qn 1.010 Hgb
 Ur Ql Strip trace-intact pH Ur-LsCnc 6.5 Prot Ur Ql Strip trace Urobilinogen 
Ur-mCnc 0.2 Nitrite Ur Ql Strip negative WBC Est Ur Ql Strip large 

 

                          2017 9:28 AM         GLUCOSE 109.0 mg/dLSODIUM 142.0 mmol/LPOTASSIUM 3.60 mmol/LCHLORIDE

 106.0 mmol/LCO2 23.0 mmol/LBUN 11.0 mg/dLCREATININE 1.30 mg/dLCALCIUM 9.80 
mg/dLAGE 66 GFR NonAA 41 GFR AA 50 eGFR 41 eGFR AA* 50 

 

                          2017 9:52 AM         COLOR YELLOW APPEARANCE CLOUDY SPEC GRAV <=1.005 pH 6.5 PROTEIN

 NEGATIVE GLUCOSE NEGATIVE mg/dLKETONE NEGATIVE BILIRUBIN NEGATIVE BLOOD SMALL 
NITRITE POSITIVE [place] SCREEN LARGE MICRO INDICATED? SEE BELOW WBC/-300 
RBC/HPF 5-10 CASTS/LPF NEGATIVE /LPFCRYSTALS NEGATIVE MUCOUS THRDS NEGATIVE 
BACTERIA 2++ EPITH CELLS 1+ SQUAMOUS /HPFTRICHOMONAS NEGATIVE YEAST NEGATIVE 
CULT SET UP? YES 

 

                          2017 10:41 AM         COLOR YELLOW APPEARANCE CLEAR SPEC GRAV <=1.005 pH 6.0 PROTEIN

 NEGATIVE GLUCOSE NEGATIVE mg/dLKETONE NEGATIVE BILIRUBIN NEGATIVE BLOOD 
NEGATIVE NITRITE NEGATIVE [place] SCREEN TRACE MICRO INDICATED? SEE BELOW WBC/HPF 
0-5 RBC/HPF RARE CASTS/LPF NEGATIVE /LPFCRYSTALS NEGATIVE MUCOUS THRDS NEGATIVE 
BACTERIA FEW EPITH CELLS 1+ SQUAMOUS /HPFTRICHOMONAS NEGATIVE YEAST NEGATIVE 
CULT SET UP? NO 

 

                          2017 5:10 AM          GLUCOSE 77.0 mg/dLSODIUM 144.0 mmol/LPOTASSIUM 3.80 mmol/LCHLORIDE

 113.0 mmol/LCO2 24.0 mmol/LBUN 11.0 mg/dLCREATININE 1.0 mg/dLSGOT/AST 14.0 
IU/LSGPT/ALT 11.0 IU/LALK PHOS 68.0 IU/LTOTAL PROTEIN 5.0 g/dLALBUMIN 3.40 
g/dLTOTAL BILI 0.30 mg/dLCALCIUM 8.80 mg/dLAGE 66 GFR NonAA 55 GFR AA 67 eGFR 55
 eGFR AA* >60 CARBAMAZEPINE 4.70 ug/mLWBC 3.3 RBC 3.37 HGB 11.10 g/dLHCT 33.80 
%.0 fLMCH 32.90 pgMCHC 32.80 g/dLRDW SD 47 RDW CV 12.80 %MPV 10.0 fLPLT 
184 NRBC# 0.00 NRBC% 0.0 %NEUT 46.80 %%LYMP 38.50 %%MONO 10.10 %%EOS 4.0 %%BASO 
0.30 %#NEUT 1.53 #LYMP 1.26 #MONO 0.33 #EOS 0.13 #BASO 0.01 MANUAL DIFF NOT IND 
RETIC % 1.180 %RETIC # 3.98 IRF 13.4 VITAMIN B12 242.0 pg/mLFOLATE 8.50 ng/mL







History Of Immunizations







       Name    Date Admin    Mfg Name    Mfg Code    Trade Name    Lot#    Route    Inj    Vis Given    Vis

 Pub                                    CVX

 

        Influenza    2008    Not Entered    NE      Not Entered            Not Entered    Not Entered

                    1            999

 

        X       12/19/2008    [employer]    MSD     PNEUMOVAX 23            Intramuscular    Not Entered

                    1            999

 

           Influenza    10/15/2010    [employer].    NOV        Fluvirin > 12 Years    

[id number]     Intramuscular    Left Deltoid    10/15/2010    2009    999

 

          X         3/23/2015    [facility]       PREVNAR 13    [id number]    Intramuscular

                Right Gluteous Medius    3/23/2015       2013       109







History of Past Illness







                    Name                Date of Onset       Comments

 

                    Peritoneal Neoplasm, Malignant                         

 

                    Hyperlipidemia                           

 

                    Bipolar disorder, unspecified                         

 

                    Artificial opening status; colostomy                         

 

                    B12 deficiency                           

 

                    Anemia, Pernicious                         

 

                    Arthritis unspecified                         

 

                    cervical cancer                          

 

                    Artificial opening status; colostomy    2010  1:10PM     

 

                    Bipolar disorder, unspecified    2010  1:10PM     

 

                    Hyperlipidemia      2010  1:10PM     

 

                    Anemia, Pernicious    2010  1:10PM     

 

                    Postoperative Follow-Up    2010  1:55PM     

 

                    Postoperative Follow-Up    Mar  8 2010 10:57AM     

 

                    Artificial opening status; colostomy    Mar  8 2010  1:19PM     

 

                    Peritoneal Neoplasm, Malignant    Mar  8 2010  1:19PM     

 

                    Artificial opening status; colostomy    2010  1:40PM     

 

                    Hyperlipidemia      2010  1:40PM     

 

                    Anemia, Pernicious    2010  1:40PM     

 

                    Peritoneal Neoplasm, Malignant    2010  1:40PM     

 

                    Arthritis unspecified    2010  1:40PM     

 

                    Anemia of Chronic Illness    2010  1:40PM     

 

                    Tinea corporis      2010  3:17PM     

 

                    Bipolar disorder, unspecified    2010  1:33PM     

 

                    Hyperlipidemia      2010  1:33PM     

 

                    Anemia, Pernicious    2010  1:33PM     

 

                    Peritoneal Neoplasm, Malignant    2010  1:33PM     

 

                    B12 deficiency      2010  1:33PM     

 

                    Ethmoidal Sinusitis, Acute    Sep 21 2010  3:53PM     

 

                    Wheezing            Sep 21 2010  3:53PM     

 

                    Flu                 Oct 15 2010  1:40PM     

 

                    Bipolar disorder, unspecified    Oct 15 2010  1:42PM     

 

                    Hyperlipidemia      Oct 15 2010  1:42PM     

 

                    Anemia, Pernicious    Oct 15 2010  1:42PM     

 

                    Peritoneal Neoplasm, Malignant    Oct 15 2010  1:42PM     

 

                    Bipolar disorder, unspecified    2011 12:01PM     

 

                    Hyperlipidemia      2011 12:01PM     

 

                    Anemia, Pernicious    2011 12:01PM     

 

                    Peritoneal Neoplasm, Malignant    2011 12:01PM     

 

                    Bipolar disorder, unspecified    Apr 15 2011 10:55AM     

 

                    Major Depression    2011 10:11AM     

 

                    Bipolar Disorder    2011 10:11AM     

 

                    Cancer              May 10 2011  4:16PM     

 

                    Major Depression    May 10 2011  3:16PM     

 

                    Bipolar Disorder    May 10 2011  3:16PM     

 

                    Hypercalcemia       May 23 2011  2:47PM     

 

                    Bipolar disorder, unspecified    May 23 2011  2:47PM     

 

                    Colon Cancer, Personal History    May 23 2011  2:47PM     

 

                    Bipolar Disorder    May 31 2011  4:39PM     

 

                    Depressive Disorder    2011 10:01AM     

 

                    Vitamin B12 deficiency    2011 10:01AM     

 

                    Vitamin D Deficiency    2011  5:07PM     

 

                    Anemia, Vitamin B12 Deficiency    2011  5:07PM     

 

                    B12 deficiency      2011  3:56PM     

 

                    Routine gynecological examination    Aug  4 2011  9:08AM     

 

                    Screening Examination for Breast Cancer    Aug  4 2011  9:08AM     

 

                    Tinea Corporis      Aug  4 2011  9:08AM     

 

                    Depressive Disorder    Sep 23 2011  8:47AM     

 

                    Contact Dermatitis    Sep 23 2011  8:47AM     

 

                    Anemia, Pernicious    Sep 23 2011  8:47AM     

 

                    B12 deficiency      Sep 23 2011  8:47AM     

 

                    B12 deficiency      Sep 27 2011  2:58PM     

 

                    Renal failure, unspecified                         

 

                    Gout                                     

 

                    Alzheimer disease                         

 

                    dementia                                 

 

                    Hyperlipidemia                           

 

                    History of colon cancer                         

 

                    B12 deficiency      Oct 20 2011  2:34PM     

 

                    Flu                 Dec  9 2011  3:16PM     

 

                    B12 deficiency      Dec  9 2011  3:17PM     

 

                    B12 deficiency      2012  4:52PM     

 

                    B12 deficiency      Feb 2012 11:10AM     

 

                    B12 deficiency      2012  3:37PM     

 

                    B12 deficiency      May  3 2012  4:10PM     

 

                    B12 deficiency      2012  2:54PM     

 

                    B12 deficiency      2012 11:23AM     

 

                    B12 deficiency      Aug  9 2012  2:08PM     

 

                    B12 deficiency      Sep  6 2012  4:36PM     

 

                    B12 deficiency      Oct 16 2012 10:23AM     

 

                    Flu                 Feb  4   3:11PM     

 

                    Bipolar disorder, unspecified    Feb  4   2:48PM     

 

                    Anemia, Pernicious    Feb  4   2:48PM     

 

                    B12 deficiency      Feb  4   2:48PM     

 

                    Extrapyramidal abnormal movement disorder    Feb  4   2:48PM     

 

                    B12 deficiency      Apr  3 2013 12:03PM     

 

                    Bipolar disorder, unspecified    May  7 2013  1:31PM     

 

                    Anemia, Pernicious    May  7 2013  1:31PM     

 

                    B12 deficiency      May  7 2013  1:31PM     

 

                    Extrapyramidal abnormal movement disorder    May  7 2013  1:31PM     

 

                    B12 deficiency      2013  3:42PM     

 

                    B12 deficiency      2013  1:31PM     

 

                    Hyperlipidemia      Aug  7 2013 10:37AM     

 

                    Vitamin D Deficiency    Aug  7 2013 10:37AM     

 

                    Bipolar disorder, unspecified    Aug  7 2013 10:37AM     

 

                    Anemia, Pernicious    Aug  7 2013 10:37AM     

 

                    B12 deficiency      Aug  7 2013 10:37AM     

 

                    B12 deficiency      Sep 25 2013 11:15AM     

 

                    B12 deficiency      Dec 11 2013  3:16PM     

 

                    B12 deficiency      Mar  6 2014  1:48PM     

 

                    B12 deficiency      May 21 2014  3:17PM     

 

                    Screening Examination for Breast Cancer    2014  3:23PM     

 

                    Periumbilical abdominal pain    2014  3:23PM     

 

                    B12 deficiency      Jul 10 2014  2:52PM     

 

                    Anemia, Vitamin B12 Deficiency    Aug 13 2014  4:50PM     

 

                    Bipolar disorder    Oct 16 2014 11:13AM     

 

                    Hyperlipidemia      Oct 16 2014 11:13AM     

 

                    Anemia, Pernicious    Oct 16 2014 11:13AM     

 

                    Peritoneal Neoplasm, Malignant    Oct 16 2014 11:13AM     

 

                    Screening breast examination    Oct 16 2014 11:13AM     

 

                    Weight loss         Oct 16 2014 11:13AM     

 

                    Anemia, Pernicious    Mar 23 2015  2:57PM     

 

                    B12 deficiency      Mar 23 2015  2:57PM     

 

                    Need for Prevnar vaccine    Mar 23 2015  2:57PM     

 

                    Bipolar disorder    Mar 23 2015  2:57PM     

 

                    Hyperlipidemia      Mar 23 2015  2:57PM     

 

                    Anemia, Pernicious    Mar 23 2015  2:57PM     

 

                    Peritoneal Neoplasm, Malignant    Mar 23 2015  2:57PM     

 

                    B12 deficiency      May  4 2015  4:48PM     

 

                    Hyperlipidemia      May 13 2015  9:56AM     

 

                    Anemia              May 13 2015  9:56AM     

 

                    Bipolar disorder    May 13 2015  9:56AM     

 

                    Bipolar disorder    May 14 2015  3:27PM     

 

                    Hyperlipidemia      May 14 2015  3:27PM     

 

                    Anemia, Pernicious    May 14 2015  3:27PM     

 

                    Peritoneal Neoplasm, Malignant    May 14 2015  3:27PM     

 

                    B12 deficiency      2015  2:20PM     

 

                    B12 deficiency      2015 11:34AM     

 

                    B12 deficiency      Aug 18 2015  9:06AM     

 

                    Tinea Corporis      Sep 18 2015  8:54AM     

 

                    B12 deficiency      Sep 18 2015  8:54AM     

 

                    B12 deficiency      2015 10:28AM     

 

                    Herpes zoster without complication    Dec  3 2015  9:52AM     

 

                    B12 deficiency      Dec 23 2015 11:21AM     

 

                    B12 deficiency      2016  4:51PM     

 

                    Vitamin B 12 deficiency    Mar 14 2016  5:35PM     

 

                    B12 deficiency      Mar 15 2016 12:14PM     

 

                    B12 deficiency      May  5 2016 11:30AM     

 

                    Edema               May  5 2016 11:30AM     

 

                    Dermatitis          May  5 2016 11:30AM     

 

                    Edema               May 17 2016  8:38AM     

 

                    Shortness of breath    May 17 2016  8:38AM     

 

                    Bilateral edema of lower extremity    2016  2:06PM     

 

                    B12 deficiency      2016  2:06PM     

 

                    B12 deficiency      2016 11:50AM     

 

                    B12 deficiency      2016 11:20AM     

 

                    Diarrhea            Aug  2 2016  3:13PM     

 

                    B12 deficiency      Aug 24 2016 11:10AM     

 

                    Encounter for screening mammogram for breast cancer    Aug 24 2016 11:44AM     

 

                    B12 deficiency      Sep 28 2016  2:35PM     

 

                    B12 deficiency      Dec 15 2016  2:02PM     

 

                    Dysuria             Dec 29 2016 12:14PM     

 

                    Hematuria           Fidencio  3 2017  1:33PM     

 

                    Constipation by delayed colonic transit    2017  1:52PM     

 

                    Ileus               2017  1:52PM     

 

                    UTI (urinary tract infection)    Fidencio 15 2017  3:39PM     

 

                    Acute cystitis with hematuria    2017 11:07AM     

 

                    B12 deficiency      2017 11:07AM     

 

                    B12 deficiency      2017 11:40AM     

 

                    B12 deficiency      2017  4:07PM     

 

                    Slurred speech      2017  3:07PM     

 

                    Vitamin B12 deficiency    2017  3:07PM     

 

                    Dysphagia, unspecified type    2017  3:07PM     

 

                    Hyperlipidemia      2017  3:07PM     

 

                    Dysuria             2017 12:01PM     

 

                    B12 deficiency      2017  2:08PM     

 

                    Dysuria             2017 10:58AM     

 

                    Hematuria           May 22 2017  1:36PM     

 

                    Depressive Disorder    May 22 2017  1:36PM     

 

                    Constipation        May 22 2017  1:36PM     

 

                    Fatigue             May 22 2017  1:36PM     

 

                    Urinary tract infection    May 30 2017  9:36AM     

 

                    Hypokalemia         May 30 2017 12:03PM     

 

                    Urinary tract infection    2017  4:36PM     

 

                    Bipolar disorder, unspecified    Aug 23 2017 11:05AM     

 

                    Anemia, Pernicious    Aug 23 2017 11:05AM     

 

                    B12 deficiency      Aug 23 2017 11:05AM     

 

                    Gingivitis          May 17 2018  2:38PM     

 

                    Colostomy complication    May 17 2018  2:38PM     

 

                    Fatigue             Sep 21 2018  9:36AM     

 

                    Weight loss         Sep 21 2018  9:36AM     

 

                    Cough               Apr  3 2019 10:23AM     







Payers







           Insurance Name    Company Name    Plan Name    Plan Number    Policy Number    Policy Group

 Number                                 Start Date

 

                    Medicare RHC Medicare RHC              [id number]              2006

 

                                [facility]      

                    [id number]                             N/A

 

                    Medicare Part B    Medicare [place]              [id number]              2006

 

                          [employer]    [employer]                 50 percent

                                                    2009

 

                    [facility]    [employer]              [id number]              N/A

 

                    Medicare Part A    Medicare Part A              [id number]              N/A

 

                    Medicare Part A    Medicare Part A              [id number]              2006



 

                          [employer]                 [id number]

                                                    

 

                    Medicare Part A    Medicare - Lab/Xray              [id number]              2006

 

                    Medicare RHC Medicare RHC              [id number]              N/A







History of Encounters







                    Visit Date          Visit Type          Provider

 

                    4/3/2019            Office visit        Bhupinder Aspen DO

 

                    2018           Office visit        Bhupinder Aspen DO

 

                    2018           Office visit        Bhupinder Aspen DO

 

                    2017           [facility]            Pranav Angel DO

 

                    2017           [facility]            EARNEST Lopez MD

 

                    2017           Office visit        Bhupinder Aspen DO

 

                    2017           Nurse visit         Bhupinder Aspen DO

 

                    2017           Office visit         

 

                    2017           Office visit        Bhupinder Aspen DO

 

                    2017           Nurse visit         Bhupinder Aspen DO

 

                    2017           Office visit        Radha Ontiveros APRN

 

                    1/15/2017           Office visit        Aj Tapia NP

 

                    2017            Office visit        Devin Masterson MD

 

                    2016          [facility]            Devin Masterson MD

 

                    12/15/2016          Nurse visit         Bhupinder Aspen DO

 

                    2016           Nurse visit         Bret Forte APRN

 

                    2016           Nurse visit         Bhupinder Aspen DO

 

                    2016            Office visit        Bhupinder Aspen DO

 

                    2016           Nurse visit         Bhupinder Aspen DO

 

                    2016           Office visit        Bret Forte APRN

 

                    2016            Office visit        Bhupinder Aspen DO

 

                    3/15/2016           Nurse visit         Bhupinder Aspen DO

 

                    2016            Nurse visit         Bhupinder Aspen DO

 

                    2015          Nurse visit         Bhupinder Aspen DO

 

                    12/3/2015           Office visit        Bhupinder Aspen DO

 

                    2015          Nurse visit         Bhupinder Aspen DO

 

                    2015           Office visit        Bhupinder Aspen DO

 

                    2015           Nurse visit         Bhupinder Aspen DO

 

                    2015            Nurse visit         Bhupinder Aspen DO

 

                    2015            Nurse visit         Bhupinder Aspen DO

 

                    2015           Office visit        Bhupinder Aspen DO

 

                    2015            Nurse visit         Bhupinder Aspen DO

 

                    3/23/2015           Office visit        Bhupinder Aspen DO

 

                    10/16/2014          Office visit        Bhupinder Aspen DO

 

                    2014           Nurse visit         Radha Ontiveros APRN

 

                    7/10/2014           Nurse visit         Bhupinder Aspen DO

 

                    2014           Office visit        Bhupinder Aspen DO

 

                    2014           Nurse visit         Bhupinder Aspen DO

 

                    3/6/2014            Nurse visit         Bhupinder Aspen DO

 

                    2014            [facility]            EARNEST Lopez MD

 

                    2013          Nurse visit         Bhupinder Aspen DO

 

                    2013           Nurse visit         Bhupinder Aspen DO

 

                    2013            Office visit        Bhupinder Aspen DO

 

                    2013            Nurse visit         Bhupinder Aspen DO

 

                    2013            Nurse visit         Bhupinder Aspen DO

 

                    2013            Office visit        Bhupinder Aspen DO

 

                    4/3/2013            Nurse visit         Bhupinder Aspen DO

 

                    2013            Office visit        Bhupinder Aspen DO

 

                    10/16/2012          Nurse visit         Bhupinder Aspen DO

 

                    2012            Nurse visit         Bhupinder Aspen DO

 

                    2012            Voided              Bhupinder Aspen DO

 

                    2012            Nurse visit         Bhupinder Aspen DO

 

                    2012            Nurse visit         Bhupinder Aspen DO

 

                    2012           Nurse visit         Bhupinder Aspen DO

 

                    5/3/2012            Nurse visit         Bhupinder Aspen DO

 

                    2012           Nurse visit         Bhupinder Aspen DO

 

                    2012           Nurse visit         Bhupinder Aspen DO

 

                    2012           Nurse visit         Bhupinder Aspen DO

 

                    2011           Nurse visit         Bhupinder Aspen DO

 

                    10/20/2011          Nurse visit         Bhupinder Aspen DO

 

                    2011           Office visit        Bhupinder Aspen DO

 

                    2011           Nurse visit         Radha GREEN

 

                    2011            Office visit        Bhupinder Aspen DO

 

                    2011           Nurse visit         Bhupinder Aspen DO

 

                    2011            Office visit        Bhupinder Aspen DO

 

                    2011           Office visit        Bhupinder Aspen DO

 

                    5/10/2011           Office visit        Bhupinder Aspen DO

 

                    2011           Office visit        Bhupinder Aspen DO

 

                    4/15/2011           Office visit        Devin Angel DO

 

                    2011           Office visit        Devin Angel DO

 

                    10/15/2010          Office visit        Devin Angel DO

 

                    2010           Office visit        Devin Angel DO

 

                    2010            Office visit        Deivn Angel DO

 

                    2010           Office visit        Devin Angel DO

 

                    2010            Office visit        Devin Angel DO

 

                    3/8/2010            Office visit        Devin Masterson MD

 

                    2010            Surgery             Devin Masterson MD

 

                    2010            Office visit        Devin Angel DO

 

                    2010           Surgery             Devin Masterson MD

 

                    2010           [facility]            Devin Masterson MD

 

                    2010           [facility]            Devin Masterson MD

 

                    10/22/2009          Office visit        Devin Angel DO

## 2019-06-26 NOTE — PROGRESS NOTE-PRE OPERATIVE
Pre-Operative Progress Note


H&P Reviewed


The H&P was reviewed, patient examined and no changes noted.


Time Seen by Provider:  07:46


Date H&P Reviewed:  Jun 26, 2019


Time H&P Reviewed:  07:47


Pre-Operative Diagnosis:  Venous Insufficiency, Malfunctioning port, stoma mass











MOSES BECERRA DO               Jun 26, 2019 07:52

## 2019-06-26 NOTE — XMS REPORT
MU2 Ambulatory Summary

                             Created on: 2017



Pauline Gan

External Reference #: [id number]

: 1950

Sex: Female



Demographics







                          Address                   [address]

[place]  [id number]

 

                          Home Phone                [phone]

 

                          Preferred Language        English

 

                          Marital Status            Legally 

 

                          Denominational Affiliation     Unknown

 

                          Race                      White

 

                          Ethnic Group              Not  or 





Author







                          Pranav Washington

 

                          Organization              [facility]

 

                          Address                   [address]

[place]  [id number]



 

                          Phone                     [phone]







Care Team Providers







                    Care Team Member Name    Role                Phone

 

                    Pranav Angel     PCP                 Unavailable

 

                    Bhupinder Louise    PreferredProvider    Unavailable







Allergies and Adverse Reactions







                    Name                Reaction            Notes

 

                    NO KNOWN DRUG ALLERGIES                         







Plan of Treatment







             Planned Activity    Comments     Planned Date    Planned Time    Plan/Goal

 

             Injection,Subcutaneous/Intramuscul, [facility]                 2017    12:00 AM      

 

             Carbamazepine level                 2017     12:00 AM      

 

             CBC W/ AUTO DIFF (RFLX MAN DIFF IF IND).                 2017     12:00 AM      

 

             CMP                       2017     12:00 AM      

 

             Retic count                 2017     12:00 AM      

 

             VITAMIN B12                 2017     12:00 AM      

 

             Folate measurement                 2017     12:00 AM      







Medications







                                        Active 

 

             Name         Start Date    Estimated Completion Date    SIG          Comments

 

                Latuda 20 mg oral tablet                                    take 1 tablet (20 mg) by oral route once daily with

 food (at least 350 calories)            

 

             pravastatin 40 mg oral tablet    3/30/2015                 TAKE 1 TABLET BY MOUTH DAILY     

 

                Namenda XR 28 mg oral capsule,sprinkle,ER 24hr    2015                       take 1 capsule (28

 mg) by oral route once daily            

 

                Namenda XR 28 mg oral capsule,sprinkle,ER 24hr    2016                       take 1 capsule (28

 mg) by oral route once daily            

 

                potassium chloride 10 mEq oral tablet extended release    2016                       take 1 tablet

 (10 meq) by oral route once daily       

 

             pravastatin 40 mg oral tablet    2016                 TAKE 1 TABLET BY MOUTH DAILY     

 

                Vitamin B-12 1,000 mcg/mL injection solution    2016                       inject 1 milliliter 

(1,000 mcg) by intramuscular route once a month     

 

                potassium chloride 10 mEq oral tablet extended release    2016                      take 1 tablet

 (10 meq) by oral route once daily       

 

                Namenda XR 28 mg oral capsule,sprinkle,ER 24hr    2016                      TAKE 1 CAPSULE BY

 MOUTH EVERY DAY                         

 

                furosemide 40 mg oral tablet    2016                      take 1 tablet (40 mg) by oral route

 once daily                              

 

                mirtazapine 45 mg oral tablet                                    take 1 tablet (45 mg) by oral route once daily

 before bedtime                          

 

             Fish Oil 300-1,000 mg oral capsule                              take 1 capsule by oral route daily     

 

             Vitamin D3 1,000 unit oral tablet                              take 1 tablet by oral route daily     

 

                Calcium 600 600 mg calcium (1,500 mg) oral tablet                                    take 1 tablet by oral route

 daily                                   

 

                Aspirin Low Dose 81 mg oral tablet,delayed release (DR/EC)                                    take 1 tablet 

(81 mg) by oral route once daily         

 

                metoclopramide HCl 10 mg oral tablet    2017                       take 1 tablet by oral route 

2 times a day for 50 days                

 

             furosemide 40 mg oral tablet    2017                 TAKE 1 TABLET BY MOUTH DAILY     

 

                Namenda XR 28 mg oral capsule,sprinkle,ER 24hr    2017                       TAKE 1 CAPSULE BY 

MOUTH EVERY DAY                          

 

                Linzess 72 mcg oral capsule    2017                       take 1 capsule (72 mcg) by oral route

 once daily on an empty stomach at least 30 minutes before 1st meal of the day     



 

             pravastatin 40 mg oral tablet    2017                 TAKE 1 TABLET BY MOUTH DAILY     

 

                metoclopramide HCl 10 mg oral tablet    2017                       TAKE 1 TABLET BY ORAL ROUTE 

2 TIMES A DAY FOR 50 DAYS                

 

                potassium chloride 10 mEq oral tablet extended release    2017                       TAKE 2 TABLETs

 BY MOUTH twice DAILY for one week       

 

                potassium chloride 10 mEq oral tablet extended release    2017                       TAKE 2 TABLETs

 BY MOUTH twice DAILY for one week       

 

             simvastatin 20 mg oral tablet    2017                 TAKE 1 TABLET BY MOUTH AT BEDTIME    

 

 

             famotidine 20 mg oral tablet    2017                 TAKE 1 TABLET BY MOUTH TWICE DAILY    

 

 

             memantine 10 mg oral tablet    2017                 TAKE 1 TABLET BY MOUTH TWICE DAILY     



 

                rivastigmine tartrate 1.5 mg oral capsule    2017                       TAKE 1 CAPSULE BY MOUTH

 TWICE DAILY BEFORE MEALS                

 

             famotidine 20 mg oral tablet    2017                 TAKE 1 TABLET BY MOUTH TWICE DAILY    

 

 

                carbamazepine 200 mg oral tablet    2017                       TAKE 1 TABLET BY MOUTH BEFORE BREAKFAST

 AND TAKE 2 TABLETS AT BEDTIME           

 

                carbamazepine 200 mg oral tablet    2017                       TAKE 1 TABLET BY MOUTH BEFORE BREAKFAST

 AND TAKE 2 TABLETS AT BEDTIME           

 

                aspirin 81 mg oral tablet,delayed release (DR/EC)    2017                       TAKE 1 TABLET BY

 MOUTH EVERY DAY                         

 

             BISACODYL 5MG PV (BOX OF 25)    2017                 TAKE 1 TABLET BY MOUTH EVERY DAY     

 

             MULTI-VITAMINS    2017                 TAKE 1 TABLET BY MOUTH EVERY DAY     









                                         

 

             Name         Start Date    Expiration Date    SIG          Comments

 

             Reglan 10mg    3/29/2010    2010    one ac and hs     

 

                Keflex 500 mg oral capsule    2010       10/1/2010       take 1 capsule (500 mg) by oral

 route every 6 hours for 10 days         

 

                Bactrim -160 mg oral tablet    2011       take 1 tablet by oral route

 every 12 hours for 7 days               

 

                triamcinolone acetonide 0.1 % topical cream    2011      apply a thin

 layer to the affected area(s) by topical route 2 times per day     

 

                sertraline 100 mg oral tablet    4/10/2012       5/10/2012       take 1.5 tablets by oral route

 daily for 30 days                       

 

                ergocalciferol (vitamin D2) 50,000 unit oral capsule    4/15/2013       2013       TAKE

 ONE CAPSULE BY MOUTH ONCE A WEEK        

 

                CYANOCOBALAM 1000MCGINJ 1000 milliliter    2013       INJECT 1ML INTRAMUSCULAR

 ONCE A MONTH                            

 

                pravastatin 40 mg oral tablet    3/25/2014       3/20/2015       TAKE ONE TABLET BY MOUTH EVERY

 DAY                                     

 

                          Zostavax (PF) 19,400 unit/0.65 mL subcutaneous suspension for reconstitution    3/23/2015

                    3/24/2015           inject 0.65 milliliter by subcutaneous route once     

 

                famciclovir 500 mg oral tablet    12/3/2015       12/10/2015      take 1 tablet (500 mg) by

 oral route every 8 hours for 7 days     

 

                furosemide 40 mg oral tablet    2016      take 1 tablet (40 mg) by oral

 route once daily                        

 

                Cipro 500 mg oral tablet    2016       take 1 tablet (500 mg) by oral route

 2 times per day for 5 days              

 

                Bactrim -160 mg oral tablet    2016        take 1 tablet by oral route

 every 12 hours for 7 days               

 

                metoclopramide HCl 10 mg oral tablet    2017       take 1 tablet by oral

 route 2 times a day for 50 days         

 

                Macrobid 100 mg oral capsule    2017       take 1 capsule (100 mg) by oral

 route 2 times per day with food for 7 days     

 

                Augmentin 875-125 mg oral tablet    2017       take 1 tablet by oral route

 every 12 hours for 7 days               

 

                amoxicillin 500 mg oral tablet    2017       take 1 tablet (500 mg) by oral

 route every 12 hours for 7 days         

 

                ciprofloxacin HCl 500 mg oral tablet    2017       take 1 tablet (500 mg)

 by oral route every 12 hours for 5 days     

 

                cefuroxime axetil 500 mg oral tablet    2017        take 1 tablet (500 mg)

 by oral route 2 times per day for 10 days     









                                        Discontinued 

 

             Name         Start Date    Discontinued Date    SIG          Comments

 

                Tylenol 325 mg oral tablet                    2013        take 1 - 2 tablets (325 -650 mg) by oral

 route every 4-6 hours as needed         

 

                Calcium 600 + D(3) 600 mg(1,500mg) -400 unit oral tablet                    2011       take 1 tablet

 by oral route 2 times a day            no longer taking

 

                Vitamin B-12 1,000 mcg oral tablet extended release    2010       take 1

 tablet by oral route daily             no longer taking

 

                Antifungal (clotrimazole) 1 % topical cream    2010       apply to the 

affected and surrounding areas of skin by topical route 2 times per day morning 
and evening                              

 

                sertraline 100 mg oral tablet    5/10/2011       2011       take 2 tablets (200 mg) by 

oral route once daily                   discontinued by Dr. Serrano

 

                mirtazapine 15 mg oral tablet                    2011        take 1 tablet (15 mg) by oral route 

once daily before bedtime               Dr. Serrano

 

                mirtazapine 15 mg oral tablet                    2011        take 1 tablet (15 mg) by oral route 

once daily before bedtime               dc'd by Dr. Serrano

 

                Pristiq 50 mg oral tablet extended release 24 hr                    2013        take 1 tablet (50

 mg) by oral route once daily           Dr. Serrano

 

                Pristiq 50 mg oral tablet extended release 24 hr                    2013        take 1 tablet (50

 mg) by oral route once daily           dose updated

 

                Vitamin B-12 1,000 mcg/mL injection solution    2011        inject 1 milliliter

 (1,000 mcg) by intramuscular route once a month    on list already

 

                    syringe with needle 1 mL 25 gauge x 1" miscellaneous syringe    2011

                          use for injection once a month     

 

                clotrimazole 1 % topical cream    2011        apply to the affected and surrounding

 areas of skin by topical route 2 times per day in the morning and evening     

 

                Vitamin D2 50,000 unit oral capsule    2011        take 1 capsule (50,000

 unit) by oral route once weekly        generic on list

 

                Pravachol 40 mg oral tablet    2012        take 1 tablet (40 mg) by oral 

route once daily for 90 days            generic on list

 

                lithium carbonate 300 mg oral capsule    2012        take 1 capsule by oral

 route daily                            dose updated

 

                Pristiq 100 mg oral tablet extended release 24 hr                    4/10/2012       take 1 and 1/2 

tablet (150 mg) by oral route once daily    Mental Health provider

 

                Pristiq 100 mg oral tablet extended release 24 hr                    4/10/2012       take 1 and 1/2 

tablet (150 mg) by oral route once daily    Discontinued by Dr Efrain [facility] at [facility]

 

                hydroxyzine HCl 50 mg oral tablet    10/16/2014      2015       take 1 tablet (50 mg) 

by oral route at bedtime                 

 

                lithium carbonate 300 mg oral capsule    2015       take 1 capsule (300

 mg) by oral route 2 for 30 days         

 

                fluconazole 100 mg oral tablet    2015       12/3/2015       take 1 tablet (100 mg) by 

oral route once a week                   

 

                ketoconazole 2 % topical cream    2015       12/3/2015       apply to the affected area(s)

 by topical route 2 times per day        

 

                prednisone 10 mg oral tablet    12/3/2015       2016        take 2 tablets (20 mg) by oral

 route once daily for 4 days 1 tablet daily for 4 days 0.5 tablet daily for 4 
days                                     

 

                triamcinolone acetonide 0.1 % topical cream    2016       apply a thin layer

 to the affected area(s) by topical route 2 times per day     

 

                Cipro 500 mg oral tablet    1/15/2017       2017       take 1 tablet (500 mg) by oral route

 every 12 hours for 10 days              







Problem List







                    Description         Status              Onset

 

                    Artificial opening status; colostomy    Active               

 

                    Bipolar disorder, unspecified    Active               

 

                    Hyperlipidemia      Active               

 

                    Peritoneal Neoplasm, Malignant    Active               

 

                    Anemia, Pernicious    Active               

 

                    Arthritis unspecified    Active               

 

                    B12 deficiency      Active               







Vital Signs







      Date    Time    BP-Sys(mm[Hg]    BP-Lynn(mm[Hg])    HR(bpm)    RR(rpm)    Temp    WT    HT    HC    BMI

                    BSA                 BMI Percentile      O2 Sat(%)

 

        2017    1:34:00 PM    118 mmHg    62 mmHg    122 bpm    18 rpm    97.8 F    161.375 lbs    

69 in                     23.83 kg/m2    1.89 m2                   96 %

 

        2017    3:05:00 PM    134 mmHg    70 mmHg    70 bpm    20 rpm    97.4 F    181 lbs    69 in

                          26.7288 kg/m    1.9992 m                 98 %

 

        2017    11:07:00 AM    124 mmHg    64 mmHg    62 bpm    17 rpm    98.2 F    181.2 lbs    69

 in                       26.76 kg/m2    2.00 m2                   98 %

 

        1/15/2017    3:34:00 PM    148 mmHg    89 mmHg    69 bpm    20 rpm    98.2 F    179 lbs    69 in

                          26.4334 kg/m    1.9882 m                 98 %

 

       2017    1:51:00 PM    160 mmHg    90 mmHg    100 bpm    20 rpm    96.5 F    179 lbs             

                                                                98 %

 

       2016    3:11:00 PM    134 mmHg    76 mmHg    80 bpm    20 rpm    98 F    163 lbs    69 in     

                24.0706 kg/m    1.8972 m                      98 %

 

        2016    2:04:00 PM    142 mmHg    86 mmHg    68 bpm    16 rpm    98.5 F    166 lbs    63 in

                          29.41 kg/m2    1.83 m2                   100 %

 

        2016    11:27:00 AM    148 mmHg    78 mmHg    90 bpm    20 rpm    98.2 F    153 lbs    69 in

                          22.5939 kg/m    1.8381 m                 96 %

 

        12/3/2015    9:50:00 AM    132 mmHg    70 mmHg    62 bpm    16 rpm    97.9 F    145 lbs    69 in

                          21.41 kg/m2    1.79 m2                   100 %

 

        2015    8:52:00 AM    132 mmHg    68 mmHg    52 bpm    20 rpm    97.8 F    141 lbs    69 in

                          20.8218 kg/m    1.7645 m                 100 %

 

        2015    3:25:00 PM    120 mmHg    62 mmHg    72 bpm    16 rpm    98.1 F    136 lbs    69 in

                          20.08 kg/m2    1.73 m2                   98 %

 

       3/23/2015    2:55:00 PM    130 mmHg    76 mmHg    68 bpm    18 rpm    97 F    140 lbs    69 in    

                20.6742 kg/m    1.7583 m                      98 %

 

        10/16/2014    11:11:00 AM    120 mmHg    66 mmHg    77 bpm    20 rpm    98 F    130 lbs    69 in

                          19.20 kg/m2    1.69 m2                   100 %

 

        2014    3:21:00 PM    130 mmHg    66 mmHg    63 bpm    18 rpm    97.2 F    160 lbs    69 in

                          23.6276 kg/m    1.8797 m                 99 %

 

        2013    10:35:00 AM    132 mmHg    70 mmHg    66 bpm    20 rpm    98.1 F    157 lbs    69 in

                          23.18 kg/m2    1.86 m2                    

 

        2013    1:29:00 PM    132 mmHg    70 mmHg    76 bpm    18 rpm    98.2 F    166 lbs    69 in 

                          24.5137 kg/m    1.9146 m                  

 

       2013    2:46:00 PM    128 mmHg    70 mmHg    76 bpm    16 rpm    98 F    160 lbs    69 in     

                23.63 kg/m2     1.88 m2                          

 

        2011    8:49:00 AM    128 mmHg    78 mmHg    70 bpm    18 rpm    97.9 F    164 lbs    69 in

                          24.2183 kg/m    1.903 m                  

 

     2011    1:31:00 PM    132 mmHg    68 mmHg    84 bpm         97 F    167 lbs                        

                                         

 

        2011    9:09:00 AM    128 mmHg    70 mmHg    72 bpm    18 rpm    98.2 F    163 lbs    64 in 

                          27.9786 kg/m    1.8272 m                  

 

       2011    10:01:00 AM    132 mmHg    70 mmHg    72 bpm    18 rpm    98.2 F    154 lbs             

                                                                 

 

       2011    2:47:00 PM    128 mmHg    70 mmHg    72 bpm    18 rpm    97.8 F    156 lbs             

                                                                 

 

       5/10/2011    3:16:00 PM    144 mmHg    80 mmHg    72 bpm    18 rpm    98.2 F    158 lbs             

                                                                 

 

        2011    10:11:00 AM    132 mmHg    70 mmHg    70 bpm    18 rpm    98.2 F    168 lbs    69 in

                          24.809 kg/m    1.9261 m                  

 

        4/15/2011    10:52:00 AM    110 mmHg    60 mmHg    75 bpm    16 rpm    97.5 F    172.375 lbs    

69 in                     25.46 kg/m2    1.95 m2                   100 %

 

        2011    11:43:00 AM    120 mmHg    82 mmHg    75 bpm    16 rpm    97.2 F    178.5 lbs    69

 in                       26.3596 kg/m    1.9854 m                 100 %

 

        10/15/2010    1:32:00 PM    120 mmHg    70 mmHg    80 bpm    18 rpm    96.6 F    177 lbs    69 in

                          26.14 kg/m2    1.98 m2                   100 %

 

        2010    3:50:00 PM    168 mmHg    100 mmHg    82 bpm    18 rpm    97.8 F    177.5 lbs    69

 in                       26.2119 kg/m    1.9798 m                 97 %

 

        2010    1:21:00 PM    140 mmHg    80 mmHg    59 bpm    16 rpm    97.6 F    173.25 lbs    69 

in                        25.58 kg/m2    1.96 m2                   100 %

 

        2010    3:02:00 PM    140 mmHg    80 mmHg    61 bpm    16 rpm    97.6 F    173.125 lbs    69

 in                       25.5658 kg/m    1.9553 m                 99 %

 

        2010    1:23:00 PM    130 mmHg    80 mmHg    66 bpm    16 rpm    96.8 F    173 lbs    69 in 

                          25.55 kg/m2    1.95 m2                   100 %

 

        2010    12:58:00 PM    130 mmHg    88 mmHg    75 bpm    16 rpm    98.4 F    172.25 lbs    69

 in                       25.4366 kg/m    1.9503 m                 100 %







Social History







                    Name                Description         Comments

 

                    denies alcohol use                         

 

                    denies smoking                           

 

                    Denies illicit substance abuse                         

 

                    retired                                 direct care

 

                    Single                                   

 

                    Exercises regularly                         

 

                    Attended some college                         

 

                    Tobacco             Never smoker         







History of Procedures







                    Date Ordered        Description         Order Status

 

                    2010 12:00 AM    COMPREHEN METABOLIC PANEL    Reviewed

 

                    2010 12:00 AM    COMPLETE CBC W/AUTO DIFF WBC    Reviewed

 

                    2010 12:00 AM    LIPID PANEL         Reviewed

 

                          2015 12:00 AM        B12 Injection, Up to 1000 Mcg NDC#2718-3218-45 [facility] Medicare 

                                        Reviewed

 

                    2011 12:00 AM    MAMMOGRAM SCREENING    Reviewed

 

                    2011 12:00 AM    CYTOPATH C/V THIN LAYER    Reviewed

 

                    2011 12:00 AM    B12 Injection 1 cc NDC#[address]-12    Reviewed

 

                    2015 12:00 AM    THER/PROPH/DIAG INJ SC/IM    Reviewed

 

                    2015 12:00 AM    B12 Injection, Up to 1000 Mcg NDC#7442-2540-87    Reviewed

 

                    2011 12:00 AM    THER/PROPH/DIAG INJ SC/IM    Reviewed

 

                    2011 12:00 AM    B12 Injection([place]) Ndc#3621-6054-03-    Reviewed

 

                    2015 12:00 AM    THER/PROPH/DIAG INJ SC/IM    Reviewed

 

                    2015 12:00 AM    B12 Injection, Up to 1000 Mcg NDC#8327-8402-35    Reviewed

 

                    10/20/2011 12:00 AM    THER/PROPH/DIAG INJ SC/IM    Reviewed

 

                    10/20/2011 12:00 AM    B12 Injection([place]) Ndc#1440-6825-99-    Reviewed

 

                    2016 12:00 AM    THER/PROPH/DIAG INJ SC/IM    Reviewed

 

                    2016 12:00 AM    B12 Injection, Up to 1000 Mcg NDC#8102-5579-83    Reviewed

 

                    3/14/2016 12:00 AM    VITAMIN B-12        Reviewed

 

                    3/15/2016 12:00 AM    THER/PROPH/DIAG INJ SC/IM    Reviewed

 

                    3/15/2016 12:00 AM    B12 Injection, Up to 1000 Mcg NDC#4278-3266-11    Reviewed

 

                    2011 12:00 AM    ***Immunization administration, Medicare flu    Reviewed

 

                    2011 12:00 AM    Fluzone ** MEDICARE Only **    Reviewed

 

                    2011 12:00 AM    THER/PROPH/DIAG INJ SC/IM    Reviewed

 

                    2011 12:00 AM    B12 Injection ([employer] [facility]) Ndc#3266-1296-09    Reviewed

 

                    2016 12:00 AM    B12 Injection, Up to 1000 Mcg NDC#3991-2950-46 [facility] Medicare    

Reviewed

 

                    2016 12:00 AM    TTE W/DOPPLER COMPLETE    Reviewed

 

                    2016 12:00 AM    EXTREMITY STUDY     Reviewed

 

                          2016 12:00 AM        B12 Injection, Up to 1000 Mcg NDC#3994-7650-39 [facility] Medicare 

                                        Reviewed

 

                    2016 12:00 AM    THER/PROPH/DIAG INJ SC/IM    Reviewed

 

                    2016 12:00 AM    B12 Injection, Up to 1000 Mcg NDC#0864-3229-26    Reviewed

 

                    2016 12:00 AM    THER/PROPH/DIAG INJ SC/IM    Reviewed

 

                    2012 12:00 AM    B12 Injection([facility]) Ndc#2672-7688-85-    Reviewed

 

                    2016 12:00 AM    B12 Injection, Up to 1000 Mcg NDC#5345-2138-51    Reviewed

 

                    2016 12:00 AM    THER/PROPH/DIAG INJ SC/IM    Reviewed

 

                    2012 12:00 AM    THER/PROPH/DIAG INJ SC/IM    Reviewed

 

                    2012 12:00 AM    B12 Injection ([employer]) Ndc#9388-9756-66    Reviewed

 

                    2016 12:00 AM    THER/PROPH/DIAG INJ SC/IM    Reviewed

 

                    2016 12:00 AM    B12 Injection, Up to 1000 Mcg NDC#0144-4523-02    Reviewed

 

                    2016 12:00 AM    B12 Injection, Up to 1000 Mcg NDC#4429-3985-21    Reviewed

 

                    2016 12:00 AM    THER/PROPH/DIAG INJ SC/IM    Reviewed

 

                    2012 12:00 AM    THER/PROPH/DIAG INJ SC/IM    Reviewed

 

                    2012 12:00 AM    B12 Injection([facility]) Ndc#2722-0171-67-    Reviewed

 

                    12/15/2016 12:00 AM    B12 Injection, Up to 1000 Mcg NDC#7598-5608-37    Reviewed

 

                    12/15/2016 12:00 AM    THER/PROPH/DIAG INJ SC/IM    Reviewed

 

                    2016 12:00 AM    URNLS DIP STICK/TABLET RGNT AUTO W/O MICROSCOPY    Reviewed

 

                    1/3/2017 12:00 AM    URNLS DIP STICK/TABLET RGNT AUTO W/O MICROSCOPY    Reviewed

 

                    2017 12:00 AM    URINE CULTURE/COLONY COUNT    Reviewed

 

                    2017 12:00 AM    Rocephin 1 gram Injection, [facility]    Reviewed

 

                    2017 12:00 AM    THERAPEUTIC PROPHYLACTIC/DX INJECTION SUBQ/IM    Reviewed

 

                    2017 12:00 AM    B12 1000mcg Injection, [facility] Medicare    Reviewed

 

                    5/3/2012 12:00 AM    THER/PROPH/DIAG INJ SC/IM    Reviewed

 

                    5/3/2012 12:00 AM    B12 Injection([facility]) Ndc#0295-4161-46-    Reviewed

 

                    2017 12:00 AM    THERAPEUTIC PROPHYLACTIC/DX INJECTION SUBQ/IM    Reviewed

 

                    2017 12:00 AM    B12 1000mcg Injection, [facility]    Reviewed

 

                    2017 12:00 AM    MRI BRAIN STEM W/O & W/DYE    Reviewed

 

                    2017 12:00 AM    VITAMIN B-12        Reviewed

 

                    2017 12:00 AM    Speech Therapy Consult    Reviewed

 

                    2017 12:00 AM    ASSAY OF CREATININE    Reviewed

 

                    2012 12:00 AM    IMMUNOTHERAPY INJECTIONS    Reviewed

 

                    2012 12:00 AM    B12 Injection([facility]) Ndc#2325-8411-26-    Reviewed

 

                    2017 12:00 AM    URINALYSIS AUTO W/SCOPE    Reviewed

 

                    2012 12:00 AM    THER/PROPH/DIAG INJ SC/IM    Reviewed

 

                    2012 12:00 AM    B12 Injection, Up to 1000 Mcg NDC#5107-2952-54    Reviewed

 

                    2017 12:00 AM    URINALYSIS AUTO W/SCOPE    Reviewed

 

                    2017 2:18 PM    URINALYSIS AUTO W/O SCOPE    Reviewed

 

                    2017 12:00 AM    URINE CULTURE/COLONY COUNT    Reviewed

 

                    2017 12:00 AM    B12 1000mcg Injection    Reviewed

 

                    2017 12:00 AM    URNLS DIP STICK/TABLET RGNT AUTO W/O MICROSCOPY    Reviewed

 

                    2017 12:00 AM    METABOLIC PANEL TOTAL CA    Reviewed

 

                    2017 12:00 AM    URNLS DIP STICK/TABLET RGNT AUTO W/O MICROSCOPY    Reviewed

 

                    2012 12:00 AM    THER/PROPH/DIAG INJ SC/IM    Reviewed

 

                    2012 12:00 AM    B12 Injection, Up to 1000 Mcg NDC#1537-2393-41    Reviewed

 

                    2012 12:00 AM    THER/PROPH/DIAG INJ SC/IM    Reviewed

 

                    2012 12:00 AM    B12 Injection, Up to 1000 Mcg NDC#8078-9008-21    Reviewed

 

                    10/16/2012 12:00 AM    THER/PROPH/DIAG INJ SC/IM    Reviewed

 

                    10/16/2012 12:00 AM    B12 Injection, Up to 1000 Mcg NDC#9765-5765-28    Reviewed

 

                    2010 12:00 AM    COMPREHEN METABOLIC PANEL    Reviewed

 

                    2010 12:00 AM    COMPLETE CBC W/AUTO DIFF WBC    Reviewed

 

                    2010 12:00 AM    LIPID PANEL         Reviewed

 

                    2013 12:00 AM    Flu Injection 3 Years And Above NDC# [address]-08  [facility]    Reviewed



 

                    2013 12:00 AM    COMPLETE CBC W/AUTO DIFF WBC    Reviewed

 

                    2013 12:00 AM    ASSAY OF LITHIUM    Reviewed

 

                    2013 12:00 AM    METABOLIC PANEL TOTAL CA    Reviewed

 

                    4/3/2013 12:00 AM    THER/PROPH/DIAG INJ SC/IM    Reviewed

 

                    4/3/2013 12:00 AM    B12 Injection, Up to 1000 Mcg NDC#7544-5743-88    Reviewed

 

                    2013 12:00 AM    THER/PROPH/DIAG INJ SC/IM    Reviewed

 

                    2013 12:00 AM    B12 Injection, Up to 1000 Mcg NDC#4620-3726-18    Reviewed

 

                    2013 12:00 AM    THER/PROPH/DIAG INJ SC/IM    Reviewed

 

                    2013 12:00 AM    B12 Injection, Up to 1000 Mcg NDC#9043-0122-32    Reviewed

 

                    2013 12:00 AM    LIPID PANEL         Reviewed

 

                    2013 12:00 AM    VITAMIN D 25 HYDROXY    Reviewed

 

                    2013 12:00 AM    THER/PROPH/DIAG INJ SC/IM    Reviewed

 

                    2013 12:00 AM    B12 Injection, Up to 1000 Mcg NDC#1310-8335-32    Reviewed

 

                    2013 12:00 AM    THER/PROPH/DIAG INJ SC/IM    Reviewed

 

                    3/6/2014 12:00 AM    THER/PROPH/DIAG INJ SC/IM    Reviewed

 

                    2014 12:00 AM    THER/PROPH/DIAG INJ SC/IM    Reviewed

 

                    2014 12:00 AM    B12 Injection, Up to 1000 Mcg NDC#6984-7651-10    Reviewed

 

                    2010 12:00 AM    SKIN FUNGI CULTURE    Reviewed

 

                    10/9/2010 12:00 AM    COMPREHEN METABOLIC PANEL    Reviewed

 

                    10/9/2010 12:00 AM    LIPID PANEL         Reviewed

 

                    2010 12:00 AM    THER/PROPH/DIAG INJ SC/IM    Reviewed

 

                    2010 12:00 AM    B12 Injection Ndc#[address]-26 ([place])    Reviewed

 

                    2010 12:00 AM    THER/PROPH/DIAG INJ SC/IM    Reviewed

 

                    2010 12:00 AM    Kenalog 40 Mg Im-Ndc#[address]-38 (Angel)    Reviewed

 

                    10/15/2010 12:00 AM    FLU VACCINE 3 YRS & > IM    Reviewed

 

                    10/15/2010 12:00 AM    Admin.Of M/C Cov.Vaccine-Flu Vacc.    Reviewed

 

                    1/15/2011 12:00 AM    COMPLETE CBC W/AUTO DIFF WBC    Reviewed

 

                    1/15/2011 12:00 AM    COMPREHEN METABOLIC PANEL    Reviewed

 

                    1/15/2011 12:00 AM    LIPID PANEL         Reviewed

 

                    2014 12:00 AM    MAMMOGRAM SCREENING    Reviewed

 

                    2014 12:00 AM    Screening mammography, bilateral    Reviewed

 

                    7/10/2014 12:00 AM    THER/PROPH/DIAG INJ SC/IM    Reviewed

 

                    7/10/2014 12:00 AM    B12 Injection, Up to 1000 Mcg NDC#2195-2623-19    Reviewed

 

                    2011 12:00 AM    COMPLETE CBC W/AUTO DIFF WBC    Reviewed

 

                    2011 12:00 AM    COMPREHEN METABOLIC PANEL    Reviewed

 

                    2011 12:00 AM    LIPID PANEL         Reviewed

 

                    2014 12:00 AM    B12 Injection, Up to 1000 Mcg NDC#8355-1183-83    Reviewed

 

                    10/19/2014 12:00 AM    MAMMOGRAM SCREENING    Reviewed

 

                    10/19/2014 12:00 AM    Screening mammography, bilateral    Reviewed

 

                    10/16/2014 12:00 AM    B12 Injection, Up to 1000 Mcg NDC#7984-9590-46    Reviewed

 

                    10/16/2014 12:00 AM    COMPLETE CBC W/AUTO DIFF WBC    Reviewed

 

                    10/16/2014 12:00 AM    COMPREHEN METABOLIC PANEL    Reviewed

 

                    10/16/2014 12:00 AM    IMMUNOASSAY TUMOR     Reviewed

 

                    10/16/2014 12:00 AM    LIPID PANEL         Reviewed

 

                    10/16/2014 12:00 AM    ASSAY OF LITHIUM    Reviewed

 

                    10/16/2014 12:00 AM    MAMMOGRAM SCREENING    Reviewed

 

                    2011 12:00 AM    ASSAY OF PARATHORMONE    Reviewed

 

                    2011 12:00 AM    VITAMIN D 25 HYDROXY    Reviewed

 

                    2011 12:00 AM    ASSAY OF LITHIUM    Reviewed

 

                    2011 12:00 AM    METABOLIC PANEL TOTAL CA    Reviewed

 

                    2011 12:00 AM    CT HEAD/BRAIN W/O & W/DYE    Reviewed

 

                    3/23/2015 12:00 AM    PNEUMOCOCCAL VACC 13 GLENDY IM    Reviewed

 

                    3/23/2015 12:00 AM    Vitamin B12 injection    Reviewed

 

                    2011 12:00 AM    ASSAY OF LITHIUM    Reviewed

 

                    2011 12:00 AM    B12 Injection Ndc#[address]-58  [place]    Reviewed

 

                    2015 12:00 AM    THER/PROPH/DIAG INJ SC/IM    Reviewed

 

                    2015 12:00 AM    B12 Injection, Up to 1000 Mcg NDC#6912-6841-48    Reviewed

 

                    2015 12:00 AM    COMPLETE CBC W/AUTO DIFF WBC    Reviewed

 

                    2015 12:00 AM    COMPREHEN METABOLIC PANEL    Reviewed

 

                    2015 12:00 AM    LIPID PANEL         Reviewed

 

                    2015 12:00 AM    ASSAY OF LITHIUM    Reviewed

 

                    2011 12:00 AM    VIT D 1 25-DIHYDROXY    Reviewed

 

                    2011 12:00 AM    VITAMIN B-12        Reviewed

 

                    2015 12:00 AM    B12 Injection, Up to 1000 Mcg NDC#1563-5965-35    Reviewed

 

                    2015 12:00 AM    THER/PROPH/DIAG INJ SC/IM    Reviewed

 

                    2015 12:00 AM    B12 Injection, Up to 1000 Mcg NDC#4082-3364-00    Reviewed

 

                    2011 12:00 AM    THER/PROPH/DIAG INJ SC/IM    Reviewed

 

                    2011 12:00 AM    B12 Injection ([employer]) Ndc#4724-1590-40    Reviewed

 

                    2015 12:00 AM    THER/PROPH/DIAG INJ SC/IM    Reviewed

 

                    2015 12:00 AM    B12 Injection, Up to 1000 Mcg NDC#3096-8410-57    Reviewed







Results Summary







                          Date and Description      Results

 

                          2010 8:45 AM          TRIGLYCERIDES 122.0 mg/dLCHOLESTEROL 186.0 mg/dLHDL 36.0 mg/dLTOT

 CHOL/HDL 5.2 LDL (CALC) 126.0 mg/dLGLUCOSE 102.0 mg/dLSODIUM 143.0 
mmol/LPOTASSIUM 3.70 mmol/LCHLORIDE 111.0 mmol/LCO2 23.0 mmol/LBUN 10.0 
mg/dLCREATININE 0.80 mg/dLSGOT/AST 12.0 IU/LSGPT/ALT 11.0 IU/LALK PHOS 65.0 
IU/LTOTAL PROTEIN 7.20 g/dLALBUMIN 3.90 g/dLTOTAL BILI 0.50 mg/dLCALCIUM 10.20 
mg/dLAGE 59 GFR NonAA 73 GFR AA 88 eGFR >60 mL/min/1.73 m2eGFR AA* >60 WBC 5.7 
RBC 3.26 HGB 10.60 g/dLHCT 31.70 %MCV 97.0 fLMCH 32.50 pgMCHC 33.40 g/dLRDW SD 
47 RDW CV 13.30 %MPV 9.70 fLPLT 287 NRBC# 0.00 NRBC% 0.0 %NEUT 62.90 %%LYMP 
21.80 %%MONO 9.90 %%EOS 5.0 %%BASO 0.40 %#NEUT 3.56 #LYMP 1.23 #MONO 0.56 #EOS 
0.28 #BASO 0.02 MANUAL DIFF NOT IND 

 

                          10/6/2010 2:45 PM         TRIGLYCERIDES 123.0 mg/dLCHOLESTEROL 178.0 mg/dLHDL 42.0 mg/dLTOT

 CHOL/HDL 4.2 LDL (CALC) 111.0 mg/dLGLUCOSE 81.0 mg/dLSODIUM 139.0 
mmol/LPOTASSIUM 4.10 mmol/LCHLORIDE 106.0 mmol/LCO2 24.0 mmol/LBUN 13.0 
mg/dLCREATININE 0.90 mg/dLSGOT/AST 13.0 IU/LSGPT/ALT 11.0 IU/LALK PHOS 61.0 
IU/LTOTAL PROTEIN 7.10 g/dLALBUMIN 3.90 g/dLTOTAL BILI 0.30 mg/dLCALCIUM 9.30 
mg/dLAGE 60 GFR NonAA 64 GFR AA 78 eGFR >60 mL/min/1.73 m2eGFR AA* >60 

 

                          2011 10:25 AM        TRIGLYCERIDES 111.0 mg/dLCHOLESTEROL 195.0 mg/dLHDL 43.0 mg/dLTOT

 CHOL/HDL 4.5 LDL (CALC) 130.0 mg/dLWBC 5.3 RBC 3.76 HGB 12.0 g/dLHCT 37.80 %MCV
 101.0 fLMCH 31.90 pgMCHC 31.70 g/dLRDW SD 47 RDW CV 13.0 %MPV 9.70 fLPLT 259 
NRBC# 0.00 NRBC% 0.0 %NEUT 69.0 %%LYMP 17.60 %%MONO 8.30 %%EOS 4.70 %%BASO 0.40 
%#NEUT 3.63 #LYMP 0.93 #MONO 0.44 #EOS 0.25 #BASO 0.02 MANUAL DIFF NOT IND 
GLUCOSE 102.0 mg/dLSODIUM 146.0 mmol/LPOTASSIUM 4.20 mmol/LCHLORIDE 113.0 
mmol/LCO2 23.0 mmol/LBUN 15.0 mg/dLCREATININE 1.0 mg/dLSGOT/AST 12.0 
IU/LSGPT/ALT 17.0 IU/LALK PHOS 60.0 IU/LTOTAL PROTEIN 6.90 g/dLALBUMIN 4.20 
g/dLTOTAL BILI 0.40 mg/dLCALCIUM 9.70 mg/dLAGE 60 GFR NonAA 57 GFR AA 69 eGFR 57
 eGFR AA* >60 

 

                          2011 11:28 AM        Lithium 2.080 mmol/LGLUCOSE 102.0 mg/dLSODIUM 135.0 mmol/LPOTASSIUM

 3.90 mmol/LCHLORIDE 106.0 mmol/LCO2 21.0 mmol/LBUN 12.0 mg/dLCREATININE 1.30 
mg/dLCALCIUM 10.70 mg/dLAGE 60 GFR NonAA 42 GFR AA 51 eGFR 42 eGFR AA* 51 

 

                          2011 8:58 AM          Lithium 0.690 mmol/L

 

                          2011 2:38 PM         VITAMIN B12 3483.0 pg/mL

 

                          2013 3:35 PM          WBC 5.1 RBC 3.73 HGB 11.70 g/dLHCT 36.40 %MCV 98.0 fLMCH 31.40

 pgMCHC 32.10 g/dLRDW SD 47 RDW CV 13.10 %MPV 9.80 fLPLT 224 NRBC# 0.00 NRBC% 
0.0 %NEUT 66.80 %%LYMP 19.10 %%MONO 9.0 %%EOS 4.90 %%BASO 0.20 %#NEUT 3.42 #LYMP
 0.98 #MONO 0.46 #EOS 0.25 #BASO 0.01 MANUAL DIFF NOT IND GLUCOSE 88.0 
mg/dLSODIUM 141.0 mmol/LPOTASSIUM 4.10 mmol/LCHLORIDE 110.0 mmol/LCO2 22.0 
mmol/LBUN 22.0 mg/dLCREATININE 1.10 mg/dLCALCIUM 9.80 mg/dLAGE 62 GFR NonAA 50 
GFR AA 61 eGFR 50 eGFR AA* 60 Lithium 0.760 mmol/L

 

                          2013 11:02 AM        TRIGLYCERIDES 106.0 mg/dLCHOLESTEROL 181.0 mg/dLHDL 46.0 mg/dLTOT

 CHOL/HDL 3.9 LDL (CALC) 114.0 mg/dLVITAMIN D 41.10 ng/mL

 

                          10/17/2014 10:10 AM       WBC 5.0 RBC 3.66 HGB 11.60 g/dLHCT 36.80 %.0 fLMCH 31.70

 pgMCHC 31.50 g/dLRDW SD 50 RDW CV 13.50 %MPV 10.10 fLPLT 209 NRBC# 0.00 NRBC% 
0.0 %NEUT 69.20 %%LYMP 21.0 %%MONO 6.40 %%EOS 3.20 %%BASO 0.20 %#NEUT 3.46 #LYMP
 1.05 #MONO 0.32 #EOS 0.16 #BASO 0.01 MANUAL DIFF NOT IND GLUCOSE 100.0 
mg/dLSODIUM 148.0 mmol/LPOTASSIUM 3.90 mmol/LCHLORIDE 114.0 mmol/LCO2 26.0 
mmol/LBUN 12.0 mg/dLCREATININE 1.20 mg/dLSGOT/AST 9.0 IU/LSGPT/ALT <6 IU/LALK 
PHOS 82.0 IU/LTOTAL PROTEIN 6.90 g/dLALBUMIN 4.0 g/dLTOTAL BILI 0.40 
mg/dLCALCIUM 10.50 mg/dLAGE 64 GFR NonAA 45 GFR AA 55 eGFR 45 eGFR AA* 55 
TRIGLYCERIDES 96.0 mg/dLCHOLESTEROL 155.0 mg/dLHDL 38.0 mg/dLTOT CHOL/HDL 4.1 
LDL (CALC) 98.0 mg/dLLithium 0.850 mmol/LCancer Antigen (CA) 125 8.30 U/mL

 

                          2015 10:25 AM        Lithium 0.790 mmol/LWBC 4.8 RBC 3.44 HGB 11.0 g/dLHCT 35.20 

%.0 fLMCH 32.0 pgMCHC 31.30 g/dLRDW SD 53 RDW CV 14.0 %MPV 9.30 fLPLT 210
 NRBC# 0.00 NRBC% 0.0 %NEUT 70.80 %%LYMP 17.20 %%MONO 8.10 %%EOS 3.50 %%BASO 
0.40 %#NEUT 3.41 #LYMP 0.83 #MONO 0.39 #EOS 0.17 #BASO 0.02 MANUAL DIFF NOT IND 
TRIGLYCERIDES 107.0 mg/dLCHOLESTEROL 174.0 mg/dLHDL 43.0 mg/dLTOT CHOL/HDL 4.0 
LDL (CALC) 110.0 mg/dLGLUCOSE 90.0 mg/dLSODIUM 145.0 mmol/LPOTASSIUM 3.80 
mmol/LCHLORIDE 115.0 mmol/LCO2 24.0 mmol/LBUN 17.0 mg/dLCREATININE 1.30 
mg/dLSGOT/AST 18.0 IU/LSGPT/ALT 17.0 IU/LALK PHOS 56.0 IU/LTOTAL PROTEIN 6.70 
g/dLALBUMIN 3.90 g/dLTOTAL BILI 0.40 mg/dLCALCIUM 9.80 mg/dLAGE 64 GFR NonAA 41 
GFR AA 50 eGFR 41 eGFR AA* 50 

 

                          3/15/2016 8:08 AM         VITAMIN B12 696.0 pg/mL

 

                          2016 2:09 PM        COLOR YELLOW APPEARANCE CLEAR SPEC GRAV 1.010 pH 5.0 PROTEIN

 30 GLUCOSE NEGATIVE mg/dLKETONE NEGATIVE BILIRUBIN NEGATIVE BLOOD LARGE NITRITE
 NEGATIVE [place] SCREEN MODERATE MICRO INDICATED? SEE BELOW WBC/HPF  RBC/HPF
 20-50 CASTS/LPF NEGATIVE /LPFCRYSTALS NEGATIVE MUCOUS THRDS NEGATIVE BACTERIA 
NEGATIVE EPITH CELLS FEW SQUAMOUS /HPFTRICHOMONAS NEGATIVE YEAST NEGATIVE CULT 
SET UP? YES 

 

                          1/3/2017 4:08 PM          COLOR YELLOW APPEARANCE HAZY SPEC GRAV 1.015 pH 6.0 PROTEIN 30

 GLUCOSE NEGATIVE mg/dLKETONE NEGATIVE BILIRUBIN NEGATIVE BLOOD MODERATE NITRITE
 NEGATIVE [facility] SCREEN LARGE MICRO INDICATED? SEE BELOW WBC/-200 RBC/HPF 
5-10 CASTS/LPF NEGATIVE /LPFCRYSTALS NEGATIVE MUCOUS THRDS NEGATIVE BACTERIA 
NEGATIVE EPITH CELLS 1+ SQUAMOUS /HPFTRICHOMONAS NEGATIVE YEAST NEGATIVE CULT 
SET UP? YES 

 

                          2017 4:24 PM         CREATININE 1.50 mg/dLAGE 66 GFR NonAA 35 GFR AA 42 eGFR 35 eGFR

 AA* 42 VITAMIN B12 1324.0 pg/mL

 

                          2017 2:45 PM         COLOR YELLOW APPEARANCE CLEAR SPEC GRAV <=1.005 pH 6.0 PROTEIN

 NEGATIVE GLUCOSE NEGATIVE mg/dLKETONE NEGATIVE BILIRUBIN NEGATIVE BLOOD TRACE-
INTACT NITRITE NEGATIVE [place] SCREEN SMALL MICRO INDICATED? SEE BELOW WBC/HPF 0-5
 RBC/HPF NEGATIVE CASTS/LPF NEGATIVE /LPFCRYSTALS NEGATIVE MUCOUS THRDS NEGATIVE
 BACTERIA FEW EPITH CELLS FEW SQUAMOUS /HPFTRICHOMONAS NEGATIVE YEAST NEGATIVE 
CULT SET UP? YES 

 

                          2017 11:22 AM        COLOR YELLOW APPEARANCE CLEAR SPEC GRAV <=1.005 pH 6.5 PROTEIN

 NEGATIVE GLUCOSE NEGATIVE mg/dLKETONE NEGATIVE BILIRUBIN NEGATIVE BLOOD 
NEGATIVE NITRITE NEGATIVE [place] SCREEN NEGATIVE MICRO INDICATED? NOT INDICATED 

 

                          2017 2:18 PM         Clarity Ur cloudy Color Ur dark yellow Glucose Ur-sCnc negative

 Bilirub Ur Ql Strip negative Ketones Ur Ql Strip negative Sp Gr Ur Qn 1.010 Hgb
 Ur Ql Strip trace-intact pH Ur-LsCnc 6.5 Prot Ur Ql Strip trace Urobilinogen 
Ur-mCnc 0.2 Nitrite Ur Ql Strip negative WBC Est Ur Ql Strip large 

 

                          2017 9:28 AM         GLUCOSE 109.0 mg/dLSODIUM 142.0 mmol/LPOTASSIUM 3.60 mmol/LCHLORIDE

 106.0 mmol/LCO2 23.0 mmol/LBUN 11.0 mg/dLCREATININE 1.30 mg/dLCALCIUM 9.80 
mg/dLAGE 66 GFR NonAA 41 GFR AA 50 eGFR 41 eGFR AA* 50 

 

                          2017 9:52 AM         COLOR YELLOW APPEARANCE CLOUDY SPEC GRAV <=1.005 pH 6.5 PROTEIN

 NEGATIVE GLUCOSE NEGATIVE mg/dLKETONE NEGATIVE BILIRUBIN NEGATIVE BLOOD SMALL 
NITRITE POSITIVE [facility] SCREEN LARGE MICRO INDICATED? SEE BELOW WBC/-300 
RBC/HPF 5-10 CASTS/LPF NEGATIVE /LPFCRYSTALS NEGATIVE MUCOUS THRDS NEGATIVE 
BACTERIA 2++ EPITH CELLS 1+ SQUAMOUS /HPFTRICHOMONAS NEGATIVE YEAST NEGATIVE 
CULT SET UP? YES 

 

                          2017 10:41 AM         COLOR YELLOW APPEARANCE CLEAR SPEC GRAV <=1.005 pH 6.0 PROTEIN

 NEGATIVE GLUCOSE NEGATIVE mg/dLKETONE NEGATIVE BILIRUBIN NEGATIVE BLOOD 
NEGATIVE NITRITE NEGATIVE [facility] SCREEN TRACE MICRO INDICATED? SEE BELOW WBC/HPF 
0-5 RBC/HPF RARE CASTS/LPF NEGATIVE /LPFCRYSTALS NEGATIVE MUCOUS THRDS NEGATIVE 
BACTERIA FEW EPITH CELLS 1+ SQUAMOUS /HPFTRICHOMONAS NEGATIVE YEAST NEGATIVE 
CULT SET UP? NO 







History Of Immunizations







       Name    Date Admin    Mfg Name    Mfg Code    Trade Name    Lot#    Route    Inj    Vis Given    Vis

 Pub                                    CVX

 

        Influenza    2008    Not Entered    NE      Not Entered            Not Entered    Not Entered

                    1            999

 

        X       12/19/2008    [employer]    MSD     Pneumovax 23            Intramuscular    Not Entered

                    1            999

 

           Influenza    10/15/2010    [employer]rely.    NOV        Fluvirin > 12 Years    

[id number]     Intramuscular    Left Deltoid    10/15/2010    2009    999

 

          X         3/23/2015    [employer]    [facility]       Prevnar 13    [id number]    Intramuscular

                Right Gluteous Medius    3/23/2015       2013       109







History of Past Illness







                    Name                Date of Onset       Comments

 

                    Peritoneal Neoplasm, Malignant                         

 

                    Hyperlipidemia                           

 

                    Bipolar disorder, unspecified                         

 

                    Artificial opening status; colostomy                         

 

                    B12 deficiency                           

 

                    Anemia, Pernicious                         

 

                    Arthritis unspecified                         

 

                    cervical cancer                          

 

                    Artificial opening status; colostomy    2010  1:10PM     

 

                    Bipolar disorder, unspecified    2010  1:10PM     

 

                    Hyperlipidemia      2010  1:10PM     

 

                    Anemia, Pernicious    2010  1:10PM     

 

                    Postoperative Follow-Up    2010  1:55PM     

 

                    Postoperative Follow-Up    Mar  8 2010 10:57AM     

 

                    Artificial opening status; colostomy    Mar  8 2010  1:19PM     

 

                    Peritoneal Neoplasm, Malignant    Mar  8 2010  1:19PM     

 

                    Artificial opening status; colostomy    2010  1:40PM     

 

                    Hyperlipidemia      2010  1:40PM     

 

                    Anemia, Pernicious    2010  1:40PM     

 

                    Peritoneal Neoplasm, Malignant    2010  1:40PM     

 

                    Arthritis unspecified    2010  1:40PM     

 

                    Anemia of Chronic Illness    2010  1:40PM     

 

                    Tinea corporis      2010  3:17PM     

 

                    Bipolar disorder, unspecified    2010  1:33PM     

 

                    Hyperlipidemia      2010  1:33PM     

 

                    Anemia, Pernicious    2010  1:33PM     

 

                    Peritoneal Neoplasm, Malignant    2010  1:33PM     

 

                    B12 deficiency      2010  1:33PM     

 

                    Ethmoidal Sinusitis, Acute    Sep 21 2010  3:53PM     

 

                    Wheezing            Sep 21 2010  3:53PM     

 

                    Flu                 Oct 15 2010  1:40PM     

 

                    Bipolar disorder, unspecified    Oct 15 2010  1:42PM     

 

                    Hyperlipidemia      Oct 15 2010  1:42PM     

 

                    Anemia, Pernicious    Oct 15 2010  1:42PM     

 

                    Peritoneal Neoplasm, Malignant    Oct 15 2010  1:42PM     

 

                    Bipolar disorder, unspecified    2011 12:01PM     

 

                    Hyperlipidemia      2011 12:01PM     

 

                    Anemia, Pernicious    2011 12:01PM     

 

                    Peritoneal Neoplasm, Malignant    2011 12:01PM     

 

                    Bipolar disorder, unspecified    Apr 15 2011 10:55AM     

 

                    Major Depression    2011 10:11AM     

 

                    Bipolar Disorder    2011 10:11AM     

 

                    Cancer              May 10 2011  4:16PM     

 

                    Major Depression    May 10 2011  3:16PM     

 

                    Bipolar Disorder    May 10 2011  3:16PM     

 

                    Hypercalcemia       May 23 2011  2:47PM     

 

                    Bipolar disorder, unspecified    May 23 2011  2:47PM     

 

                    Colon Cancer, Personal History    May 23 2011  2:47PM     

 

                    Bipolar Disorder    May 31 2011  4:39PM     

 

                    Depressive Disorder    2011 10:01AM     

 

                    Vitamin B12 deficiency    2011 10:01AM     

 

                    Vitamin D Deficiency    2011  5:07PM     

 

                    Anemia, Vitamin B12 Deficiency    2011  5:07PM     

 

                    B12 deficiency      2011  3:56PM     

 

                    Routine gynecological examination    Aug  4 2011  9:08AM     

 

                    Screening Examination for Breast Cancer    Aug  4 2011  9:08AM     

 

                    Tinea Corporis      Aug  4 2011  9:08AM     

 

                    Depressive Disorder    Sep 23 2011  8:47AM     

 

                    Contact Dermatitis    Sep 23 2011  8:47AM     

 

                    Anemia, Pernicious    Sep 23 2011  8:47AM     

 

                    B12 deficiency      Sep 23 2011  8:47AM     

 

                    B12 deficiency      Sep 27 2011  2:58PM     

 

                    B12 deficiency      Oct 20 2011  2:34PM     

 

                    Flu                 Dec  9 2011  3:16PM     

 

                    B12 deficiency      Dec  9 2011  3:17PM     

 

                    B12 deficiency      2012  4:52PM     

 

                    B12 deficiency      2012 11:10AM     

 

                    B12 deficiency      2012  3:37PM     

 

                    B12 deficiency      May  3 2012  4:10PM     

 

                    B12 deficiency      2012  2:54PM     

 

                    B12 deficiency      2012 11:23AM     

 

                    B12 deficiency      Aug  9 2012  2:08PM     

 

                    B12 deficiency      Sep  6 2012  4:36PM     

 

                    B12 deficiency      Oct 16 2012 10:23AM     

 

                    Flu                 Feb  2013  3:11PM     

 

                    Bipolar disorder, unspecified    Feb  2013  2:48PM     

 

                    Anemia, Pernicious    Feb  2013  2:48PM     

 

                    B12 deficiency      Feb  2013  2:48PM     

 

                    Extrapyramidal abnormal movement disorder    Feb  2013  2:48PM     

 

                    B12 deficiency      Apr  3 2013 12:03PM     

 

                    Bipolar disorder, unspecified    May  7 2013  1:31PM     

 

                    Anemia, Pernicious    May  7 2013  1:31PM     

 

                    B12 deficiency      May  7 2013  1:31PM     

 

                    Extrapyramidal abnormal movement disorder    May  7 2013  1:31PM     

 

                    B12 deficiency      2013  3:42PM     

 

                    B12 deficiency      2013  1:31PM     

 

                    Hyperlipidemia      Aug  7 2013 10:37AM     

 

                    Vitamin D Deficiency    Aug  7 2013 10:37AM     

 

                    Bipolar disorder, unspecified    Aug  7 2013 10:37AM     

 

                    Anemia, Pernicious    Aug  7 2013 10:37AM     

 

                    B12 deficiency      Aug  7 2013 10:37AM     

 

                    B12 deficiency      Sep 25 2013 11:15AM     

 

                    B12 deficiency      Dec 11 2013  3:16PM     

 

                    B12 deficiency      Mar  6 2014  1:48PM     

 

                    B12 deficiency      May 21 2014  3:17PM     

 

                    Screening Examination for Breast Cancer    2014  3:23PM     

 

                    Periumbilical abdominal pain    2014  3:23PM     

 

                    B12 deficiency      Jul 10 2014  2:52PM     

 

                    Anemia, Vitamin B12 Deficiency    Aug 13 2014  4:50PM     

 

                    Bipolar disorder    Oct 16 2014 11:13AM     

 

                    Hyperlipidemia      Oct 16 2014 11:13AM     

 

                    Anemia, Pernicious    Oct 16 2014 11:13AM     

 

                    Peritoneal Neoplasm, Malignant    Oct 16 2014 11:13AM     

 

                    Screening breast examination    Oct 16 2014 11:13AM     

 

                    Weight loss         Oct 16 2014 11:13AM     

 

                    Anemia, Pernicious    Mar 23 2015  2:57PM     

 

                    B12 deficiency      Mar 23 2015  2:57PM     

 

                    Need for Prevnar vaccine    Mar 23 2015  2:57PM     

 

                    Bipolar disorder    Mar 23 2015  2:57PM     

 

                    Hyperlipidemia      Mar 23 2015  2:57PM     

 

                    Anemia, Pernicious    Mar 23 2015  2:57PM     

 

                    Peritoneal Neoplasm, Malignant    Mar 23 2015  2:57PM     

 

                    B12 deficiency      May  4 2015  4:48PM     

 

                    Hyperlipidemia      May 13 2015  9:56AM     

 

                    Anemia              May 13 2015  9:56AM     

 

                    Bipolar disorder    May 13 2015  9:56AM     

 

                    Bipolar disorder    May 14 2015  3:27PM     

 

                    Hyperlipidemia      May 14 2015  3:27PM     

 

                    Anemia, Pernicious    May 14 2015  3:27PM     

 

                    Peritoneal Neoplasm, Malignant    May 14 2015  3:27PM     

 

                    B12 deficiency      2015  2:20PM     

 

                    B12 deficiency      2015 11:34AM     

 

                    B12 deficiency      Aug 18 2015  9:06AM     

 

                    Tinea Corporis      Sep 18 2015  8:54AM     

 

                    B12 deficiency      Sep 18 2015  8:54AM     

 

                    B12 deficiency      2015 10:28AM     

 

                    Herpes zoster without complication    Dec  3 2015  9:52AM     

 

                    B12 deficiency      Dec 23 2015 11:21AM     

 

                    B12 deficiency      2016  4:51PM     

 

                    Vitamin B 12 deficiency    Mar 14 2016  5:35PM     

 

                    B12 deficiency      Mar 15 2016 12:14PM     

 

                    B12 deficiency      May  5 2016 11:30AM     

 

                    Edema               May  5 2016 11:30AM     

 

                    Dermatitis          May  5 2016 11:30AM     

 

                    Edema               May 17 2016  8:38AM     

 

                    Shortness of breath    May 17 2016  8:38AM     

 

                    Bilateral edema of lower extremity    2016  2:06PM     

 

                    B12 deficiency      2016  2:06PM     

 

                    B12 deficiency      2016 11:50AM     

 

                    B12 deficiency      2016 11:20AM     

 

                    Diarrhea            Aug  2 2016  3:13PM     

 

                    B12 deficiency      Aug 24 2016 11:10AM     

 

                    Encounter for screening mammogram for breast cancer    Aug 24 2016 11:44AM     

 

                    B12 deficiency      Sep 28 2016  2:35PM     

 

                    B12 deficiency      Dec 15 2016  2:02PM     

 

                    Dysuria             Dec 29 2016 12:14PM     

 

                    Hematuria           Fidencio  3 2017  1:33PM     

 

                    Constipation by delayed colonic transit    2017  1:52PM     

 

                    Ileus               2017  1:52PM     

 

                    UTI (urinary tract infection)    Fidencio 15 2017  3:39PM     

 

                    Acute cystitis with hematuria    2017 11:07AM     

 

                    B12 deficiency      2017 11:07AM     

 

                    B12 deficiency      2017 11:40AM     

 

                    B12 deficiency      2017  4:07PM     

 

                    Slurred speech      2017  3:07PM     

 

                    Vitamin B12 deficiency    2017  3:07PM     

 

                    Dysphagia, unspecified type    2017  3:07PM     

 

                    Hyperlipidemia      2017  3:07PM     

 

                    Dysuria             2017 12:01PM     

 

                    B12 deficiency      2017  2:08PM     

 

                    Dysuria             2017 10:58AM     

 

                    Hematuria           May 22 2017  1:36PM     

 

                    Depressive Disorder    May 22 2017  1:36PM     

 

                    Constipation        May 22 2017  1:36PM     

 

                    Fatigue             May 22 2017  1:36PM     

 

                    Urinary tract infection    May 30 2017  9:36AM     

 

                    Hypokalemia         May 30 2017 12:03PM     

 

                    Urinary tract infection    2017  4:36PM     

 

                    Bipolar disorder, unspecified    Aug 23 2017 11:05AM     

 

                    Anemia, Pernicious    Aug 23 2017 11:05AM     

 

                    B12 deficiency      Aug 23 2017 11:05AM     







Payers







           Insurance Name    Company Name    Plan Name    Plan Number    Policy Number    Policy Group

 Number                                 Start Date

 

                    Medicare RHC Medicare RHC              [id number]              N/A

 

                          [employer]    [employer]                 [id number]

                                                    

 

                    Medicare Part A    Medicare - Lab/Xray              [id number]              2006

 

                    Medicare Part B    Medicare [place]              [id number]              2006

 

                          [employer]    [employer]                 50 percent

                                                    2009

 

                    [facility]              [id number]              N/A

 

                    Medicare Part A    Medicare Part A              [id number]              N/A

 

                    Medicare Part A    Medicare Part A              [id number]              2006









History of Encounters







                    Visit Date          Visit Type          Provider

 

                    2017           [facility]            Pranav Angel MD

 

                    2017           Office visit        Bhupinder Aspen DO

 

                    2017           Nurse visit         Bhupinder Aspen DO

 

                    2017           Office visit         

 

                    2017           Office visit        Bhupinder Aspen DO

 

                    2017           Nurse visit         Bhupinder Aspen DO

 

                    2017           Office visit        Radha Ontiveros APRN

 

                    1/15/2017           Office visit        Aj Tapia NP

 

                    2017            Office visit        Devin Masterson MD

 

                    2016          [facility]            Devin Masterson MD

 

                    12/15/2016          Nurse visit         Bhupinder Aspen DO

 

                    2016           Nurse visit         Bret Forte APRN

 

                    2016           Nurse visit         Bhupinder Aspen DO

 

                    2016            Office visit        Bhupinder Aspen DO

 

                    2016           Nurse visit         Bhupinder Aspen DO

 

                    2016           Office visit        Bret Forte APRN

 

                    2016            Office visit        Bhupinder Aspen DO

 

                    3/15/2016           Nurse visit         Bhupinder Aspen DO

 

                    2016            Nurse visit         Bhupinder Aspen DO

 

                    2015          Nurse visit         Bhupinder Aspen DO

 

                    12/3/2015           Office visit        Bhupinder Aspen DO

 

                    2015          Nurse visit         Bhupinder Aspen DO

 

                    2015           Office visit        Bhupinder Aspen DO

 

                    2015           Nurse visit         Bhupinder Aspen DO

 

                    2015            Nurse visit         Bhupinder Aspen DO

 

                    2015            Nurse visit         Bhupinder Aspen DO

 

                    2015           Office visit        Bhupinder Aspen DO

 

                    2015            Nurse visit         Bhupinder Aspen DO

 

                    3/23/2015           Office visit        Bhupinder Aspen DO

 

                    10/16/2014          Office visit        Bhupinder Aspen DO

 

                    2014           Nurse visit         Radha Ontiveros APRN

 

                    7/10/2014           Nurse visit         Bhupinder Aspen DO

 

                    2014           Office visit        Bhupinder Aspen DO

 

                    2014           Nurse visit         Bhupinder Aspen DO

 

                    3/6/2014            Nurse visit         Bhupinder Aspen DO

 

                    2014            [facility]            EARNEST Lopez MD

 

                    2013          Nurse visit         Bhupinder Aspen DO

 

                    2013           Nurse visit         Bhupinder Aspen DO

 

                    2013            Office visit        Bhupinder Aspen DO

 

                    2013            Nurse visit         Bhupinder Aspen DO

 

                    2013            Nurse visit         Bhupinder Aspen DO

 

                    2013            Office visit        Bhupinder Aspen DO

 

                    4/3/2013            Nurse visit         Bhupinder Aspen DO

 

                    2013            Office visit        Bhuipnder Aspen DO

 

                    10/16/2012          Nurse visit         Bhupinder Aspen DO

 

                    2012            Nurse visit         Bhupinder Aspen DO

 

                    2012            Voided              Bhupinder Aspen DO

 

                    2012            Nurse visit         Bhupinder Aspen DO

 

                    2012            Nurse visit         Bhupinder Aspen DO

 

                    2012           Nurse visit         Bhupinder Aspen DO

 

                    5/3/2012            Nurse visit         Bhupinder Aspen DO

 

                    2012           Nurse visit         Bhupinder Aspen DO

 

                    2012           Nurse visit         Bhupinder Aspen DO

 

                    2012           Nurse visit         Bhupinder Aspen DO

 

                    2011           Nurse visit         Bhupinder Aspen DO

 

                    10/20/2011          Nurse visit         Bhupinder Aspen DO

 

                    2011           Office visit        Bhupinder Aspen DO

 

                    2011           Nurse visit         Radha GREEN

 

                    2011            Office visit        Bhupinder Aspen DO

 

                    2011           Nurse visit         Bhupinder Aspen DO

 

                    2011            Office visit        Bhupidner Aspen DO

 

                    2011           Office visit        Bhupinder Aspen DO

 

                    5/10/2011           Office visit        Bhupinder Aspen DO

 

                    2011           Office visit        Bhupinder Aspen DO

 

                    4/15/2011           Office visit        Devin Angel DO

 

                    2011           Office visit        Devin Angel DO

 

                    10/15/2010          Office visit        Devin Angel DO

 

                    2010           Office visit        Devin Angel DO

 

                    2010            Office visit        Devin Angel DO

 

                    2010           Office visit        Devin Angel DO

 

                    2010            Office visit        Devin Angel DO

 

                    3/8/2010            Office visit        Devin Masterson MD

 

                    2010            Surgery             Devin Masterson MD

 

                    2010            Office visit        Devin Angel DO

 

                    2010           Surgery             Devin Masterson MD

 

                    2010           Hospital            Devin Masterson MD

 

                    2010           [facility]            Devin Masterson MD

 

                    10/22/2009          Office visit        Devin Angel DO

## 2019-06-26 NOTE — XMS REPORT
Patient Summary (HL7 CCD)

                             Created on: 2018



YARELY ADAME

External Reference #: [id number]

: 1950

Sex: Female



Demographics







                          Address                   [address]

[place]  [address]

 

                          Home Phone                [phone]

 

                          Preferred Language        Unknown

 

                          Marital Status            Unknown

 

                          Judaism Affiliation     Unknown

 

                          Race                      White

 

                          Ethnic Group              Not  or 





Author







                          Author                    J LUIS ROE              Unknown

 

                          Address                   [address]

[place]  [id number]



 

                          Phone                     [phone]







Care Team Providers







                    Care Team Member Name    Role                Phone

 

                    RYAN SPIVEY MD    Attphys             0

 

                    RYAN SPIVEY MD    Prisurg             0



                                                                



Allergies

          Unknown or Not Available.                                             
                                          



Active Medications

          





           Medication          Code          Dose          Units          Frequency          Route   

                          Modification              Start Date/Time    

 

             Famotidine 20MG Oral Tablet          [id number]          20           MILLIGRAMS          TWO

 TIMES A DAY          BY MOUTH                                2017 10:22    

 

                          Prescription Detail           20 MILLIGRAMS BY MOUTH TWO TIMES A DAY     

 

             Namenda 10MG Oral Tablet          [id number]          10           MILLIGRAMS          TWO TIMES

 A DAY              BY MOUTH                                2017 10:22    

 

                          Prescription Detail           10 MILLIGRAMS BY MOUTH TWO TIMES A DAY     

 

                Rivastigmine Tartrate 1.5MG Oral Capsule          [id number]          1.5             MILLIGRAMS

                BEFORE TWO MEALS          BY MOUTH                          2017 10:22    

 

                          Prescription Detail           1.5 MILLIGRAMS BY MOUTH BEFORE TWO MEALS     

 

                Sertraline HCl 50MG Oral Tablet          [id number]          50              MILLIGRAMS       

                DAILY           BY MOUTH                          2017 10:22    

 

                          Prescription Detail           50 MILLIGRAMS BY MOUTH DAILY     

 

             Simvastatin 20MG Oral Tablet          [id number]          20           MILLIGRAMS          AT

 BEDTIME            BY MOUTH                                2017 10:22    

 

                          Prescription Detail           20 MILLIGRAMS BY MOUTH AT BEDTIME     

 

                Aspirin 81MG Oral Tablet, Chewable          [id number]          81              MILLIGRAMS    

                DAILY WITH A MEAL          BY MOUTH                          2017 10:21    

 

                          Prescription Detail           81 MILLIGRAMS BY MOUTH DAILY WITH A MEAL     

 

                Benztropine Mesylate 1MG Oral Tablet          379013          1               MILLIGRAMS   

                DAILY           BY MOUTH                          2017 10:21    

 

                          Prescription Detail           1 MILLIGRAMS BY MOUTH DAILY     

 

                Bisacodyl 5MG Oral Tablet, Enteric Coated          [id number]          5               MILLIGRAMS

                DAILY           BY MOUTH                          2017 10:21    

 

                          Prescription Detail           5 MILLIGRAMS BY MOUTH DAILY     

 

                carBAMazepine 200MG Oral Tablet          [id number]          200             MILLIGRAMS      

                BEFORE BREAKFAST          BY MOUTH                          2017 10:21    

 

                          Prescription Detail           200 MILLIGRAMS BY MOUTH BEFORE BREAKFAST     

 

                carBAMazepine 200MG Oral Tablet          [id number]          400             MILLIGRAMS      

                BEDTIME          BY MOUTH                          2017 10:21    

 

                          Prescription Detail           400 MILLIGRAMS BY MOUTH BEDTIME     



                                                                                
                                                                                
                



Problems

          





             Problem          Code          Start Date          Resolved Date          Status    

 

             Encephalopathy          01421334          2017                       Active    

 

             Respiratory failure          901421451          2017                       Active   

 

 

             Hypoxia          908577867          2017                       Active    

 

             Hypotension          63657626          2017                       Active    

 

             Ileus          75256273          2016          Resolved    

 

             Abdominal pain          91332906          2016          Resolved

    



                                                                                
                                                         



Procedures

          





                Procedure          Code            Procedure Type          Date    

 

                ABDOMEN ONE VIEW          [id number]          SNOMED CT          2017    



                                                                                
       



Results

          Unknown or Not Available.                                             
                                



Encounters

          





                    Encounter Diagnosis          Diagnosis Code          Start Date    

 

                    Constipation, unspecified                         2017    



                                                                                
       



Function Status

          Unknown or Not Available.                                             
                                



History of Immunizations

          





                    Immunization          Code                Date    

 

                    Pneumococcal conjugate PCV 13          133                 2015    

 

                    influenza, injectable, quadrivalent, preservative free          150                 2016

    



                                                                                
       



Plan of Treatment

          Unknown or Not Available.                                             
                      



Social History

          





                Smoking Status          Code            Start Date          End Date    

 

                Never smoker          [id number]                               



                                                                                
       



Vital Signs

          Unknown or Not Available.                                             
                      



Function Status

          Unknown or Not Available.                                             
  



Goals

          Unknown or Not Available.                                             
            



ASSESSMENTS

          Unknown or Not Available.                                             
                      



Health Concerns Section

          Unknown or Not Available.

## 2019-06-26 NOTE — XMS REPORT
MU2 Ambulatory Summary

                             Created on: 2017



Pauline Gan

External Reference #: [id number]

: 1950

Sex: Female



Demographics







                          Address                   [address]

[place]  [id number]

 

                          Home Phone                [phone]

 

                          Preferred Language        English

 

                          Marital Status            Legally 

 

                          Caodaism Affiliation     Unknown

 

                          Race                      White

 

                          Ethnic Group              Not  or 





Author







                          Bhupinder Aguilar

 

                          [facility]

 

                          Address                   [address]

[place]  [id number]



 

                          Phone                     [phone]







Care Team Providers







                    Care Team Member Name    Role                Phone

 

                    Bhupinder Louise    PCP                 Unavailable

 

                    Bhupinder Louise    PreferredProvider    Unavailable







Allergies and Adverse Reactions







                    Name                Reaction            Notes

 

                    NO KNOWN DRUG ALLERGIES                         







Plan of Treatment







             Planned Activity    Comments     Planned Date    Planned Time    Plan/Goal

 

             Injection, Subcutaneous/IM                 2016    12:00 AM      

 

             Injection, Subcutaneous/IM                 2016    12:00 AM      

 

             Mammography; bilateral                 2016    12:00 AM      

 

             Injection, Subcutaneous/IM                 2016    12:00 AM      

 

             Injection,Subcutaneous/Intramuscul, [facility]                 2017    12:00 AM      

 

             Swallowing evaluation                 2017    12:00 AM      

 

             CBC with Auto                 2013     12:00 AM      

 

             Lithium                   2013     12:00 AM      

 

             Basic metabolic panel                 2013     12:00 AM      

 

             Vitamin B12 (cyanocobalamin)                 2013     12:00 AM      

 

             Folic acid, serum                 2013     12:00 AM      

 

             Injection,Subcutaneous/Intramuscul                 2013    12:00 AM      







Medications







                                        Active 

 

             Name         Start Date    Estimated Completion Date    SIG          Comments

 

                Latuda 20 mg oral tablet                                    take 1 tablet (20 mg) by oral route once daily with

 food (at least 350 calories)            

 

             pravastatin 40 mg oral tablet    3/30/2015                 TAKE 1 TABLET BY MOUTH DAILY     

 

                Namenda XR 28 mg oral capsule,sprinkle,ER 24hr    2015                       take 1 capsule (28

 mg) by oral route once daily            

 

                Namenda XR 28 mg oral capsule,sprinkle,ER 24hr    2016                       take 1 capsule (28

 mg) by oral route once daily            

 

                potassium chloride 10 mEq oral tablet extended release    2016                       take 1 tablet

 (10 meq) by oral route once daily       

 

             pravastatin 40 mg oral tablet    2016                 TAKE 1 TABLET BY MOUTH DAILY     

 

                Vitamin B-12 1,000 mcg/mL injection solution    2016                       inject 1 milliliter 

(1,000 mcg) by intramuscular route once a month     

 

                potassium chloride 10 mEq oral tablet extended release    2016                      take 1 tablet

 (10 meq) by oral route once daily       

 

                Namenda XR 28 mg oral capsule,sprinkle,ER 24hr    2016                      TAKE 1 CAPSULE BY

 MOUTH EVERY DAY                         

 

                furosemide 40 mg oral tablet    2016                      take 1 tablet (40 mg) by oral route

 once daily                              

 

                mirtazapine 45 mg oral tablet                                    take 1 tablet (45 mg) by oral route once daily

 before bedtime                          

 

             Fish Oil 300-1,000 mg oral capsule                              take 1 capsule by oral route daily     

 

             Vitamin D3 1,000 unit oral tablet                              take 1 tablet by oral route daily     

 

                Calcium 600 600 mg calcium (1,500 mg) oral tablet                                    take 1 tablet by oral route

 daily                                   

 

                Aspirin Low Dose 81 mg oral tablet,delayed release (DR/EC)                                    take 1 tablet 

(81 mg) by oral route once daily         









                                         

 

             Name         Start Date    Expiration Date    SIG          Comments

 

             Reglan 10mg    3/29/2010    2010    one ac and hs     

 

                Keflex 500 mg oral capsule    2010       10/1/2010       take 1 capsule (500 mg) by oral

 route every 6 hours for 10 days         

 

                Bactrim -160 mg oral tablet    2011       take 1 tablet by oral route

 every 12 hours for 7 days               

 

                triamcinolone acetonide 0.1 % topical cream    2011      apply a thin

 layer to the affected area(s) by topical route 2 times per day     

 

                sertraline 100 mg oral tablet    4/10/2012       5/10/2012       take 1.5 tablets by oral route

 daily for 30 days                       

 

                ergocalciferol (vitamin D2) 50,000 unit oral capsule    4/15/2013       2013       TAKE

 ONE CAPSULE BY MOUTH ONCE A WEEK        

 

                CYANOCOBALAM 1000MCGINJ 1000 milliliter    2013       INJECT 1ML INTRAMUSCULAR

 ONCE A MONTH                            

 

                pravastatin 40 mg oral tablet    3/25/2014       3/20/2015       TAKE ONE TABLET BY MOUTH EVERY

 DAY                                     

 

                          Zostavax (PF) 19,400 unit/0.65 mL subcutaneous suspension for reconstitution    3/23/2015

                    3/24/2015           inject 0.65 milliliter by subcutaneous route once     

 

                famciclovir 500 mg oral tablet    12/3/2015       12/10/2015      take 1 tablet (500 mg) by

 oral route every 8 hours for 7 days     

 

                furosemide 40 mg oral tablet    2016      take 1 tablet (40 mg) by oral

 route once daily                        

 

                Cipro 500 mg oral tablet    2016       take 1 tablet (500 mg) by oral route

 2 times per day for 5 days              

 

                Bactrim -160 mg oral tablet    2016        take 1 tablet by oral route

 every 12 hours for 7 days               

 

                metoclopramide HCl 10 mg oral tablet    2017       take 1 tablet by oral

 route 2 times a day for 50 days         

 

                Macrobid 100 mg oral capsule    2017       take 1 capsule (100 mg) by oral

 route 2 times per day with food for 7 days     

 

                Augmentin 875-125 mg oral tablet    2017       take 1 tablet by oral route

 every 12 hours for 7 days               









                                        Discontinued 

 

             Name         Start Date    Discontinued Date    SIG          Comments

 

                Tylenol 325 mg oral tablet                    2013        take 1 - 2 tablets (325 -650 mg) by oral

 route every 4-6 hours as needed         

 

                Calcium 600 + D(3) 600 mg(1,500mg) -400 unit oral tablet                    2011       take 1 tablet

 by oral route 2 times a day            no longer taking

 

                Vitamin B-12 1,000 mcg oral tablet extended release    2010       take 1

 tablet by oral route daily             no longer taking

 

                Antifungal (clotrimazole) 1 % topical cream    2010       apply to the 

affected and surrounding areas of skin by topical route 2 times per day morning 
and evening                              

 

                sertraline 100 mg oral tablet    5/10/2011       2011       take 2 tablets (200 mg) by 

oral route once daily                   discontinued by Dr. Serrano

 

                mirtazapine 15 mg oral tablet                    2011        take 1 tablet (15 mg) by oral route 

once daily before bedtime               Dr. Serrano

 

                mirtazapine 15 mg oral tablet                    2011        take 1 tablet (15 mg) by oral route 

once daily before bedtime               dc'd by Dr. Serrano

 

                Pristiq 50 mg oral tablet extended release 24 hr                    2013        take 1 tablet (50

 mg) by oral route once daily           Dr. Zach

 

                Pristiq 50 mg oral tablet extended release 24 hr                    2013        take 1 tablet (50

 mg) by oral route once daily           dose updated

 

                Vitamin B-12 1,000 mcg/mL injection solution    2011        inject 1 milliliter

 (1,000 mcg) by intramuscular route once a month    on list already

 

                    syringe with needle 1 mL 25 gauge x 1" miscellaneous syringe    2011

                          use for injection once a month     

 

                clotrimazole 1 % topical cream    2011        apply to the affected and surrounding

 areas of skin by topical route 2 times per day in the morning and evening     

 

                Vitamin D2 50,000 unit oral capsule    2011        take 1 capsule (50,000

 unit) by oral route once weekly        generic on list

 

                Pravachol 40 mg oral tablet    2012        take 1 tablet (40 mg) by oral 

route once daily for 90 days            generic on list

 

                lithium carbonate 300 mg oral capsule    2012        take 1 capsule by oral

 route daily                            dose updated

 

                Pristiq 100 mg oral tablet extended release 24 hr                    4/10/2012       take 1 and 1/2 

tablet (150 mg) by oral route once daily    Mental Health provider

 

                Pristiq 100 mg oral tablet extended release 24 hr                    4/10/2012       take 1 and 1/2 

tablet (150 mg) by oral route once daily    Discontinued by Dr Efrain Knight at [facility]

 

                hydroxyzine HCl 50 mg oral tablet    10/16/2014      2015       take 1 tablet (50 mg) 

by oral route at bedtime                 

 

                lithium carbonate 300 mg oral capsule    2015       take 1 capsule (300

 mg) by oral route 2 for 30 days         

 

                fluconazole 100 mg oral tablet    2015       12/3/2015       take 1 tablet (100 mg) by 

oral route once a week                   

 

                ketoconazole 2 % topical cream    2015       12/3/2015       apply to the affected area(s)

 by topical route 2 times per day        

 

                prednisone 10 mg oral tablet    12/3/2015       2016        take 2 tablets (20 mg) by oral

 route once daily for 4 days 1 tablet daily for 4 days 0.5 tablet daily for 4 
days                                     

 

                triamcinolone acetonide 0.1 % topical cream    2016       apply a thin layer

 to the affected area(s) by topical route 2 times per day     

 

                Cipro 500 mg oral tablet    1/15/2017       2017       take 1 tablet (500 mg) by oral route

 every 12 hours for 10 days              







Problem List







                    Description         Status              Onset

 

                    Artificial opening status; colostomy    Active               

 

                    Bipolar disorder, unspecified    Active               

 

                    Hyperlipidemia      Active               

 

                    Peritoneal Neoplasm, Malignant    Active               

 

                    Anemia, Pernicious    Active               

 

                    Arthritis unspecified    Active               

 

                    B12 deficiency      Active               







Vital Signs







      Date    Time    BP-Sys(mm[Hg]    BP-Lynn(mm[Hg])    HR(bpm)    RR(rpm)    Temp    WT    HT    HC    BMI

                    BSA                 BMI Percentile      O2 Sat(%)

 

        2017    3:05:00 PM    134 mmHg    70 mmHg    70 bpm    20 rpm    97.4 F    181 lbs    69 in

                          26.73 kg/m2    2.00 m2                   98 %

 

        2017    11:07:00 AM    124 mmHg    64 mmHg    62 bpm    17 rpm    98.2 F    181.2 lbs    69

 in                       26.7583 kg/m    2.0003 m                 98 %

 

        1/15/2017    3:34:00 PM    148 mmHg    89 mmHg    69 bpm    20 rpm    98.2 F    179 lbs    69 in

                          26.43 kg/m2    1.99 m2                   98 %

 

       2017    1:51:00 PM    160 mmHg    90 mmHg    100 bpm    20 rpm    96.5 F    179 lbs             

                                                                98 %

 

       2016    3:11:00 PM    134 mmHg    76 mmHg    80 bpm    20 rpm    98 F    163 lbs    69 in     

                24.07 kg/m2     1.90 m2                         98 %

 

        2016    2:04:00 PM    142 mmHg    86 mmHg    68 bpm    16 rpm    98.5 F    166 lbs    63 in

                          29.4053 kg/m    1.8295 m                 100 %

 

        2016    11:27:00 AM    148 mmHg    78 mmHg    90 bpm    20 rpm    98.2 F    153 lbs    69 in

                          22.59 kg/m2    1.84 m2                   96 %

 

        12/3/2015    9:50:00 AM    132 mmHg    70 mmHg    62 bpm    16 rpm    97.9 F    145 lbs    69 in

                          21.4125 kg/m    1.7894 m                 100 %

 

        2015    8:52:00 AM    132 mmHg    68 mmHg    52 bpm    20 rpm    97.8 F    141 lbs    69 in

                          20.82 kg/m2    1.76 m2                   100 %

 

        2015    3:25:00 PM    120 mmHg    62 mmHg    72 bpm    16 rpm    98.1 F    136 lbs    69 in

                          20.0835 kg/m    1.733 m                 98 %

 

       3/23/2015    2:55:00 PM    130 mmHg    76 mmHg    68 bpm    18 rpm    97 F    140 lbs    69 in    

                20.67 kg/m2     1.76 m2                         98 %

 

        10/16/2014    11:11:00 AM    120 mmHg    66 mmHg    77 bpm    20 rpm    98 F    130 lbs    69 in

                          19.1974 kg/m    1.6943 m                 100 %

 

        2014    3:21:00 PM    130 mmHg    66 mmHg    63 bpm    18 rpm    97.2 F    160 lbs    69 in

                          23.6276 kg/m    1.88 m2                   99 %

 

        2013    10:35:00 AM    132 mmHg    70 mmHg    66 bpm    20 rpm    98.1 F    157 lbs    69 in

                          23.18 kg/m2    1.862 m                  

 

        2013    1:29:00 PM    132 mmHg    70 mmHg    76 bpm    18 rpm    98.2 F    166 lbs    69 in 

                          24.5137 kg/m    1.91 m2                    

 

       2013    2:46:00 PM    128 mmHg    70 mmHg    76 bpm    16 rpm    98 F    160 lbs    69 in     

                23.63 kg/m2     1.8797 m                       

 

        2011    8:49:00 AM    128 mmHg    78 mmHg    70 bpm    18 rpm    97.9 F    164 lbs    69 in

                          24.2183 kg/m    1.90 m2                    

 

     2011    1:31:00 PM    132 mmHg    68 mmHg    84 bpm         97 F    167 lbs                        

                                         

 

        2011    9:09:00 AM    128 mmHg    70 mmHg    72 bpm    18 rpm    98.2 F    163 lbs    64 in 

                          27.9786 kg/m    1.83 m2                    

 

       2011    10:01:00 AM    132 mmHg    70 mmHg    72 bpm    18 rpm    98.2 F    154 lbs             

                                                                 

 

       2011    2:47:00 PM    128 mmHg    70 mmHg    72 bpm    18 rpm    97.8 F    156 lbs             

                                                                 

 

       5/10/2011    3:16:00 PM    144 mmHg    80 mmHg    72 bpm    18 rpm    98.2 F    158 lbs             

                                                                 

 

        2011    10:11:00 AM    132 mmHg    70 mmHg    70 bpm    18 rpm    98.2 F    168 lbs    69 in

                          24.809 kg/m    1.93 m2                    

 

        4/15/2011    10:52:00 AM    110 mmHg    60 mmHg    75 bpm    16 rpm    97.5 F    172.375 lbs    

69 in                     25.46 kg/m2    1.951 m                 100 %

 

        2011    11:43:00 AM    120 mmHg    82 mmHg    75 bpm    16 rpm    97.2 F    178.5 lbs    69

 in                       26.3596 kg/m    1.99 m2                   100 %

 

        10/15/2010    1:32:00 PM    120 mmHg    70 mmHg    80 bpm    18 rpm    96.6 F    177 lbs    69 in

                          26.14 kg/m2    1.977 m                 100 %

 

        2010    3:50:00 PM    168 mmHg    100 mmHg    82 bpm    18 rpm    97.8 F    177.5 lbs    69

 in                       26.2119 kg/m    1.98 m2                   97 %

 

        2010    1:21:00 PM    140 mmHg    80 mmHg    59 bpm    16 rpm    97.6 F    173.25 lbs    69 

in                        25.58 kg/m2    1.956 m                 100 %

 

        2010    3:02:00 PM    140 mmHg    80 mmHg    61 bpm    16 rpm    97.6 F    173.125 lbs    69

 in                       25.5658 kg/m    1.96 m2                   99 %

 

        2010    1:23:00 PM    130 mmHg    80 mmHg    66 bpm    16 rpm    96.8 F    173 lbs    69 in 

                          25.55 kg/m2    1.9546 m                 100 %

 

        2010    12:58:00 PM    130 mmHg    88 mmHg    75 bpm    16 rpm    98.4 F    172.25 lbs    69

 in                       25.4366 kg/m    1.95 m2                   100 %







Social History







                    Name                Description         Comments

 

                    denies alcohol use                         

 

                    denies smoking                           

 

                    Denies illicit substance abuse                         

 

                    retired                                 direct care

 

                    Single                                   

 

                    Exercises regularly                         

 

                    Attended some college                         

 

                    Tobacco             Never smoker         







History of Procedures







                    Date Ordered        Description         Order Status

 

                    2010 12:00 AM    COMPREHEN METABOLIC PANEL    Reviewed

 

                    2010 12:00 AM    COMPLETE CBC W/AUTO DIFF WBC    Reviewed

 

                    2010 12:00 AM    LIPID PANEL         Reviewed

 

                          2015 12:00 AM        B12 Injection, Up to 1000 Mcg NDC#0541-6691-90 [facility] 

                                        Reviewed

 

                    2011 12:00 AM    MAMMOGRAM SCREENING    Reviewed

 

                    2011 12:00 AM    CYTOPATH C/V THIN LAYER    Reviewed

 

                    2011 12:00 AM    B12 Injection 1 cc NDC#[address]-64    Reviewed

 

                    2015 12:00 AM    THER/PROPH/DIAG INJ SC/IM    Reviewed

 

                    2015 12:00 AM    B12 Injection, Up to 1000 Mcg NDC#6229-5563-47    Reviewed

 

                    2011 12:00 AM    THER/PROPH/DIAG INJ SC/IM    Reviewed

 

                    2011 12:00 AM    B12 Injection([place]) Ndc#9799-5303-31-    Reviewed

 

                    2015 12:00 AM    THER/PROPH/DIAG INJ SC/IM    Reviewed

 

                    2015 12:00 AM    B12 Injection, Up to 1000 Mcg NDC#4858-0472-06    Reviewed

 

                    10/20/2011 12:00 AM    THER/PROPH/DIAG INJ SC/IM    Reviewed

 

                    10/20/2011 12:00 AM    B12 Injection([place]) Ndc#3396-8945-57-    Reviewed

 

                    2016 12:00 AM    THER/PROPH/DIAG INJ SC/IM    Reviewed

 

                    2016 12:00 AM    B12 Injection, Up to 1000 Mcg NDC#7188-7360-27    Reviewed

 

                    3/14/2016 12:00 AM    VITAMIN B-12        Reviewed

 

                    3/15/2016 12:00 AM    THER/PROPH/DIAG INJ SC/IM    Reviewed

 

                    3/15/2016 12:00 AM    B12 Injection, Up to 1000 Mcg NDC#4277-8195-83    Reviewed

 

                    2011 12:00 AM    ***Immunization administration, Medicare flu    Reviewed

 

                    2011 12:00 AM    Fluzone ** MEDICARE Only **    Reviewed

 

                    2011 12:00 AM    THER/PROPH/DIAG INJ SC/IM    Reviewed

 

                    2011 12:00 AM    B12 Injection ([facility]) Ndc#8621-9088-66    Reviewed

 

                    2016 12:00 AM    B12 Injection, Up to 1000 Mcg NDC#9208-2103-31 [facility] Medicare    

Reviewed

 

                    2016 12:00 AM    TTE W/DOPPLER COMPLETE    Reviewed

 

                    2016 12:00 AM    EXTREMITY STUDY     Returned

 

                          2016 12:00 AM        B12 Injection, Up to 1000 Mcg NDC#3898-0700-17 [facility] 

                                        Reviewed

 

                    2016 12:00 AM    THER/PROPH/DIAG INJ SC/IM    Reviewed

 

                    2012 12:00 AM    THER/PROPH/DIAG INJ SC/IM    Reviewed

 

                    2012 12:00 AM    B12 Injection ([employer]) Ndc#6301-6691-28    Reviewed

 

                    2016 12:00 AM    THER/PROPH/DIAG INJ SC/IM    Reviewed

 

                    2016 12:00 AM    B12 Injection, Up to 1000 Mcg NDC#2833-7410-14    Reviewed

 

                    2012 12:00 AM    THER/PROPH/DIAG INJ SC/IM    Reviewed

 

                    2012 12:00 AM    B12 Injection([place]) Ndc#8460-2953-92-    Reviewed

 

                    12/15/2016 12:00 AM    B12 Injection, Up to 1000 Mcg NDC#9853-6055-56    Reviewed

 

                    12/15/2016 12:00 AM    THER/PROPH/DIAG INJ SC/IM    Reviewed

 

                    2016 12:00 AM    URNLS DIP STICK/TABLET RGNT AUTO W/O MICROSCOPY    Returned

 

                    1/3/2017 12:00 AM    URNLS DIP STICK/TABLET RGNT AUTO W/O MICROSCOPY    Returned

 

                    2017 12:00 AM    URINE CULTURE/COLONY COUNT    Returned

 

                    2017 12:00 AM    Rocephin 1 gram Injection, [facility] Medicare    Reviewed

 

                    2017 12:00 AM    THERAPEUTIC PROPHYLACTIC/DX INJECTION SUBQ/IM    Reviewed

 

                    2017 12:00 AM    B12 1000mcg Injection, [facility] Medicare    Reviewed

 

                    5/3/2012 12:00 AM    THER/PROPH/DIAG INJ SC/IM    Reviewed

 

                    5/3/2012 12:00 AM    B12 Injection([place]) Ndc#3783-9993-08-    Reviewed

 

                    2017 12:00 AM    MRI BRAIN STEM W/O & W/DYE    Returned

 

                    2017 12:00 AM    VITAMIN B-12        Reviewed

 

                    2017 12:00 AM    ASSAY OF CREATININE    Reviewed

 

                    2012 12:00 AM    IMMUNOTHERAPY INJECTIONS    Reviewed

 

                    2012 12:00 AM    B12 Injection([place]) Ndc#6904-7156-05-    Reviewed

 

                    2012 12:00 AM    THER/PROPH/DIAG INJ SC/IM    Reviewed

 

                    2012 12:00 AM    B12 Injection, Up to 1000 Mcg NDC#9084-3056-33    Reviewed

 

                    2012 12:00 AM    THER/PROPH/DIAG INJ SC/IM    Reviewed

 

                    2012 12:00 AM    B12 Injection, Up to 1000 Mcg NDC#7797-3880-99    Reviewed

 

                    2012 12:00 AM    THER/PROPH/DIAG INJ SC/IM    Reviewed

 

                    2012 12:00 AM    B12 Injection, Up to 1000 Mcg NDC#8371-5748-69    Reviewed

 

                    10/16/2012 12:00 AM    THER/PROPH/DIAG INJ SC/IM    Reviewed

 

                    10/16/2012 12:00 AM    B12 Injection, Up to 1000 Mcg NDC#3714-3388-88    Reviewed

 

                    2010 12:00 AM    COMPREHEN METABOLIC PANEL    Reviewed

 

                    2010 12:00 AM    COMPLETE CBC W/AUTO DIFF WBC    Reviewed

 

                    2010 12:00 AM    LIPID PANEL         Reviewed

 

                    2013 12:00 AM    Flu Injection 3 Years And Above NDC# [address]-67  [facility]    Reviewed



 

                    2013 12:00 AM    COMPLETE CBC W/AUTO DIFF WBC    Reviewed

 

                    2013 12:00 AM    ASSAY OF LITHIUM    Reviewed

 

                    2013 12:00 AM    METABOLIC PANEL TOTAL CA    Reviewed

 

                    4/3/2013 12:00 AM    THER/PROPH/DIAG INJ SC/IM    Reviewed

 

                    4/3/2013 12:00 AM    B12 Injection, Up to 1000 Mcg NDC#6936-0683-69    Reviewed

 

                    2013 12:00 AM    THER/PROPH/DIAG INJ SC/IM    Reviewed

 

                    2013 12:00 AM    B12 Injection, Up to 1000 Mcg NDC#5199-1231-81    Reviewed

 

                    2013 12:00 AM    THER/PROPH/DIAG INJ SC/IM    Reviewed

 

                    2013 12:00 AM    B12 Injection, Up to 1000 Mcg NDC#3484-2947-57    Reviewed

 

                    2013 12:00 AM    LIPID PANEL         Reviewed

 

                    2013 12:00 AM    VITAMIN D 25 HYDROXY    Reviewed

 

                    2013 12:00 AM    THER/PROPH/DIAG INJ SC/IM    Reviewed

 

                    3/6/2014 12:00 AM    THER/PROPH/DIAG INJ SC/IM    Reviewed

 

                    2014 12:00 AM    THER/PROPH/DIAG INJ SC/IM    Reviewed

 

                    2014 12:00 AM    B12 Injection, Up to 1000 Mcg NDC#0934-3803-66    Reviewed

 

                    2010 12:00 AM    SKIN FUNGI CULTURE    Reviewed

 

                    10/9/2010 12:00 AM    COMPREHEN METABOLIC PANEL    Reviewed

 

                    10/9/2010 12:00 AM    LIPID PANEL         Reviewed

 

                    2010 12:00 AM    THER/PROPH/DIAG INJ SC/IM    Reviewed

 

                    2010 12:00 AM    B12 Injection Ndc#[address]-02 ([facility])    Reviewed

 

                    2010 12:00 AM    THER/PROPH/DIAG INJ SC/IM    Reviewed

 

                    2010 12:00 AM    Kenalog 40 Mg Im-Ndc#[address]-11 ([facility])    Reviewed

 

                    10/15/2010 12:00 AM    FLU VACCINE 3 YRS & > IM    Reviewed

 

                    10/15/2010 12:00 AM    Admin.Of M/C Cov.Vaccine-Flu Vacc.    Reviewed

 

                    1/15/2011 12:00 AM    COMPLETE CBC W/AUTO DIFF WBC    Reviewed

 

                    1/15/2011 12:00 AM    COMPREHEN METABOLIC PANEL    Reviewed

 

                    1/15/2011 12:00 AM    LIPID PANEL         Reviewed

 

                    2014 12:00 AM    MAMMOGRAM SCREENING    Reviewed

 

                    2014 12:00 AM    Screening mammography, bilateral    Reviewed

 

                    7/10/2014 12:00 AM    THER/PROPH/DIAG INJ SC/IM    Reviewed

 

                    7/10/2014 12:00 AM    B12 Injection, Up to 1000 Mcg NDC#7345-7271-53    Reviewed

 

                    2011 12:00 AM    COMPLETE CBC W/AUTO DIFF WBC    Reviewed

 

                    2011 12:00 AM    COMPREHEN METABOLIC PANEL    Reviewed

 

                    2011 12:00 AM    LIPID PANEL         Reviewed

 

                    2014 12:00 AM    B12 Injection, Up to 1000 Mcg NDC#9003-5374-86    Reviewed

 

                    10/19/2014 12:00 AM    MAMMOGRAM SCREENING    Reviewed

 

                    10/19/2014 12:00 AM    Screening mammography, bilateral    Reviewed

 

                    10/16/2014 12:00 AM    COMPLETE CBC W/AUTO DIFF WBC    Reviewed

 

                    10/16/2014 12:00 AM    COMPREHEN METABOLIC PANEL    Reviewed

 

                    10/16/2014 12:00 AM    IMMUNOASSAY TUMOR     Reviewed

 

                    10/16/2014 12:00 AM    LIPID PANEL         Reviewed

 

                    10/16/2014 12:00 AM    ASSAY OF LITHIUM    Reviewed

 

                    10/16/2014 12:00 AM    MAMMOGRAM SCREENING    Reviewed

 

                    2011 12:00 AM    ASSAY OF PARATHORMONE    Reviewed

 

                    2011 12:00 AM    VITAMIN D 25 HYDROXY    Reviewed

 

                    2011 12:00 AM    ASSAY OF LITHIUM    Reviewed

 

                    2011 12:00 AM    METABOLIC PANEL TOTAL CA    Reviewed

 

                    2011 12:00 AM    CT HEAD/BRAIN W/O & W/DYE    Reviewed

 

                    3/23/2015 12:00 AM    PNEUMOCOCCAL VACC 13 GLENDY IM    Reviewed

 

                    3/23/2015 12:00 AM    Vitamin B12 injection    Reviewed

 

                    2011 12:00 AM    ASSAY OF LITHIUM    Reviewed

 

                    2011 12:00 AM    B12 Injection Ndc#[address]-85  [facility]    Reviewed

 

                    2015 12:00 AM    THER/PROPH/DIAG INJ SC/IM    Reviewed

 

                    2015 12:00 AM    B12 Injection, Up to 1000 Mcg NDC#1972-9818-50    Reviewed

 

                    2015 12:00 AM    COMPLETE CBC W/AUTO DIFF WBC    Reviewed

 

                    2015 12:00 AM    COMPREHEN METABOLIC PANEL    Reviewed

 

                    2015 12:00 AM    LIPID PANEL         Reviewed

 

                    2015 12:00 AM    ASSAY OF LITHIUM    Reviewed

 

                    2011 12:00 AM    VIT D 1 25-DIHYDROXY    Reviewed

 

                    2011 12:00 AM    VITAMIN B-12        Reviewed

 

                    2015 12:00 AM    B12 Injection, Up to 1000 Mcg NDC#7245-7102-51    Reviewed

 

                    2015 12:00 AM    THER/PROPH/DIAG INJ SC/IM    Reviewed

 

                    2015 12:00 AM    B12 Injection, Up to 1000 Mcg NDC#9496-4547-20    Reviewed

 

                    2011 12:00 AM    THER/PROPH/DIAG INJ SC/IM    Reviewed

 

                    2011 12:00 AM    B12 Injection ([employer]) Ndc#3550-8228-30    Reviewed

 

                    2015 12:00 AM    THER/PROPH/DIAG INJ SC/IM    Reviewed

 

                    2015 12:00 AM    B12 Injection, Up to 1000 Mcg NDC#1288-0112-02    Reviewed







Results Summary







                          Data and Description      Results

 

                          2004 12:00 AM        Colonoscopy-Women and Men over 50 Normal 

 

                          2008 12:00 AM         Pap Smear Declined 

 

                          10/7/2009 12:00 AM        Cholest Cry Stone Ql .0 %LDLc SerPl-mCnc 123.0 mg/dLHDLc

 SerPl-mCnc 34.0 mg/dLTrigl SerPl-mCnc 190.0 mg/dLGlucose SerPl-mCnc 78.0 mg/dL

 

                          2009 12:00 AM        Mammogram -Women over 40 Normal HIV1+2 Ab Ser Ql no risk 

 

                          2010 8:47 AM         Dexa Bone Scan Refused Aspirin reccommended Contraindication 



 

                          2010 8:48 AM         Depression Done 

 

                          2010 12:00 AM         Foot Exam-Diabetic Done 

 

                          2010 12:00 AM         Cholest Cry Stone Ql .0 %LDLc SerPl-mCnc 126.0 mg/dLGlucose

 SerPl-mCnc 102.0 mg/dL

 

                          2010 8:45 AM          TRIGLYCERIDES 122.0 mg/dLCHOLESTEROL 186.0 mg/dLHDL 36.0 mg/dLTOT

 CHOL/HDL 5.2 LDL (CALC) 126.0 mg/dLGLUCOSE 102.0 mg/dLSODIUM 143.0 
mmol/LPOTASSIUM 3.70 mmol/LCHLORIDE 111.0 mmol/LCO2 23.0 mmol/LBUN 10.0 
mg/dLCREATININE 0.80 mg/dLSGOT/AST 12.0 IU/LSGPT/ALT 11.0 IU/LALK PHOS 65.0 
IU/LTOTAL PROTEIN 7.20 g/dLALBUMIN 3.90 g/dLTOTAL BILI 0.50 mg/dLCALCIUM 10.20 
mg/dLAGE 59 GFR NonAA 73 GFR AA 88 eGFR >60 mL/min/1.73 m2eGFR AA* >60 WBC 5.7 
RBC 3.26 HGB 10.60 g/dLHCT 31.70 %MCV 97.0 fLMCH 32.50 pgMCHC 33.40 g/dLRDW SD 
47 RDW CV 13.30 %MPV 9.70 fLPLT 287 NRBC# 0.00 NRBC% 0.0 %NEUT 62.90 %%LYMP 
21.80 %%MONO 9.90 %%EOS 5.0 %%BASO 0.40 %#NEUT 3.56 #LYMP 1.23 #MONO 0.56 #EOS 
0.28 #BASO 0.02 MANUAL DIFF NOT IND 

 

                          2010 12:00 AM        Glucose SerPl-mCnc 96.0 mg/dLCholest Cry Stone Ql .0 %LDLc

 SerPl-mCnc 146.0 mg/dL

 

                          2010 8:26 AM         TRIGLYCERIDES 106.0 mg/dLCHOLESTEROL 199.0 mg/dLHDL 32.0 mg/dLTOT

 CHOL/HDL 6.2 LDL (CALC) 146.0 mg/dLGLUCOSE 96.0 mg/dLSODIUM 143.0 
mmol/LPOTASSIUM 4.0 mmol/LCHLORIDE 113.0 mmol/LCO2 24.0 mmol/LBUN 13.0 
mg/dLCREATININE 1.0 mg/dLSGOT/AST 11.0 IU/LSGPT/ALT 6.0 IU/LALK PHOS 56.0 
IU/LTOTAL PROTEIN 6.60 g/dLALBUMIN 3.80 g/dLTOTAL BILI 0.50 mg/dLCALCIUM 9.30 
mg/dLAGE 59 GFR NonAA 57 GFR AA 69 eGFR 57 eGFR AA* >60 

 

                          10/6/2010 12:00 AM        Cholest Cry Stone Ql .0 %LDLc SerPl-mCnc 111.0 mg/dLGlucose

 SerPl-mCnc 81.0 mg/dL

 

                          10/6/2010 2:45 PM         TRIGLYCERIDES 123.0 mg/dLCHOLESTEROL 178.0 mg/dLHDL 42.0 mg/dLTOT

 CHOL/HDL 4.2 LDL (CALC) 111.0 mg/dLGLUCOSE 81.0 mg/dLSODIUM 139.0 
mmol/LPOTASSIUM 4.10 mmol/LCHLORIDE 106.0 mmol/LCO2 24.0 mmol/LBUN 13.0 
mg/dLCREATININE 0.90 mg/dLSGOT/AST 13.0 IU/LSGPT/ALT 11.0 IU/LALK PHOS 61.0 
IU/LTOTAL PROTEIN 7.10 g/dLALBUMIN 3.90 g/dLTOTAL BILI 0.30 mg/dLCALCIUM 9.30 
mg/dLAGE 60 GFR NonAA 64 GFR AA 78 eGFR >60 mL/min/1.73 m2eGFR AA* >60 WBC 6.9 
RBC 3.59 HGB 11.50 g/dLHCT 35.30 %MCV 98.0 fLMCH 32.0 pgMCHC 32.60 g/dLRDW SD 46
 RDW CV 12.90 %MPV 9.90 fLPLT 311 NRBC# 0.00 NRBC% 0.0 %NEUT 64.90 %%LYMP 22.50 
%%MONO 7.20 %%EOS 5.10 %%BASO 0.30 %#NEUT 4.45 #LYMP 1.54 #MONO 0.49 #EOS 0.35 
#BASO 0.02 MANUAL DIFF NOT IND 

 

                          2011 12:00 AM         Mammogram -Women over 40 Ordered 

 

                          2011 10:25 AM        TRIGLYCERIDES 111.0 mg/dLCHOLESTEROL 195.0 mg/dLHDL 43.0 mg/dLTOT

 CHOL/HDL 4.5 LDL (CALC) 130.0 mg/dLWBC 5.3 RBC 3.76 HGB 12.0 g/dLHCT 37.80 %MCV
 101.0 fLMCH 31.90 pgMCHC 31.70 g/dLRDW SD 47 RDW CV 13.0 %MPV 9.70 fLPLT 259 
NRBC# 0.00 NRBC% 0.0 %NEUT 69.0 %%LYMP 17.60 %%MONO 8.30 %%EOS 4.70 %%BASO 0.40 
%#NEUT 3.63 #LYMP 0.93 #MONO 0.44 #EOS 0.25 #BASO 0.02 MANUAL DIFF NOT IND 
GLUCOSE 102.0 mg/dLSODIUM 146.0 mmol/LPOTASSIUM 4.20 mmol/LCHLORIDE 113.0 
mmol/LCO2 23.0 mmol/LBUN 15.0 mg/dLCREATININE 1.0 mg/dLSGOT/AST 12.0 
IU/LSGPT/ALT 17.0 IU/LALK PHOS 60.0 IU/LTOTAL PROTEIN 6.90 g/dLALBUMIN 4.20 
g/dLTOTAL BILI 0.40 mg/dLCALCIUM 9.70 mg/dLAGE 60 GFR NonAA 57 GFR AA 69 eGFR 57
 eGFR AA* >60 

 

                          2011 11:49 AM        Cholest Cry Stone Ql .0 %LDLc SerPl-mCnc 130.0 mg/dLHDLc

 SerPl-mCnc 43.0 mg/dLTrigl SerPl-mCnc 111.0 mg/dLGlucose SerPl-mCnc 102.0 mg/dL

 

                          2011 11:52 AM        Pap Smear Declined 

 

                          2011 11:28 AM        Lithium 2.080 mmol/LGLUCOSE 102.0 mg/dLSODIUM 135.0 mmol/LPOTASSIUM

 3.90 mmol/LCHLORIDE 106.0 mmol/LCO2 21.0 mmol/LBUN 12.0 mg/dLCREATININE 1.30 
mg/dLCALCIUM 10.70 mg/dLAGE 60 GFR NonAA 42 GFR AA 51 eGFR 42 eGFR AA* 51 

 

                          2011 8:58 AM          Lithium 0.690 mmol/L

 

                          2011 2:38 PM         VITAMIN B12 3483.0 pg/mL

 

                          2013 3:35 PM          WBC 5.1 RBC 3.73 HGB 11.70 g/dLHCT 36.40 %MCV 98.0 fLMCH 31.40

 pgMCHC 32.10 g/dLRDW SD 47 RDW CV 13.10 %MPV 9.80 fLPLT 224 NRBC# 0.00 NRBC% 
0.0 %NEUT 66.80 %%LYMP 19.10 %%MONO 9.0 %%EOS 4.90 %%BASO 0.20 %#NEUT 3.42 #LYMP
 0.98 #MONO 0.46 #EOS 0.25 #BASO 0.01 MANUAL DIFF NOT IND GLUCOSE 88.0 
mg/dLSODIUM 141.0 mmol/LPOTASSIUM 4.10 mmol/LCHLORIDE 110.0 mmol/LCO2 22.0 
mmol/LBUN 22.0 mg/dLCREATININE 1.10 mg/dLCALCIUM 9.80 mg/dLAGE 62 GFR NonAA 50 
GFR AA 61 eGFR 50 eGFR AA* 60 Lithium 0.760 mmol/L

 

                          2013 11:02 AM        TRIGLYCERIDES 106.0 mg/dLCHOLESTEROL 181.0 mg/dLHDL 46.0 mg/dLTOT

 CHOL/HDL 3.9 LDL (CALC) 114.0 mg/dLVITAMIN D 41.10 ng/mL

 

                          10/17/2014 10:10 AM       WBC 5.0 RBC 3.66 HGB 11.60 g/dLHCT 36.80 %.0 fLMCH 31.70

 pgMCHC 31.50 g/dLRDW SD 50 RDW CV 13.50 %MPV 10.10 fLPLT 209 NRBC# 0.00 NRBC% 
0.0 %NEUT 69.20 %%LYMP 21.0 %%MONO 6.40 %%EOS 3.20 %%BASO 0.20 %#NEUT 3.46 #LYMP
 1.05 #MONO 0.32 #EOS 0.16 #BASO 0.01 MANUAL DIFF NOT IND GLUCOSE 100.0 
mg/dLSODIUM 148.0 mmol/LPOTASSIUM 3.90 mmol/LCHLORIDE 114.0 mmol/LCO2 26.0 
mmol/LBUN 12.0 mg/dLCREATININE 1.20 mg/dLSGOT/AST 9.0 IU/LSGPT/ALT <6 IU/LALK 
PHOS 82.0 IU/LTOTAL PROTEIN 6.90 g/dLALBUMIN 4.0 g/dLTOTAL BILI 0.40 
mg/dLCALCIUM 10.50 mg/dLAGE 64 GFR NonAA 45 GFR AA 55 eGFR 45 eGFR AA* 55 
TRIGLYCERIDES 96.0 mg/dLCHOLESTEROL 155.0 mg/dLHDL 38.0 mg/dLTOT CHOL/HDL 4.1 
LDL (CALC) 98.0 mg/dLLithium 0.850 mmol/LCancer Antigen (CA) 125 8.30 U/mL

 

                          2015 10:25 AM        Lithium 0.790 mmol/LWBC 4.8 RBC 3.44 HGB 11.0 g/dLHCT 35.20 

%.0 fLMCH 32.0 pgMCHC 31.30 g/dLRDW SD 53 RDW CV 14.0 %MPV 9.30 fLPLT 210
 NRBC# 0.00 NRBC% 0.0 %NEUT 70.80 %%LYMP 17.20 %%MONO 8.10 %%EOS 3.50 %%BASO 
0.40 %#NEUT 3.41 #LYMP 0.83 #MONO 0.39 #EOS 0.17 #BASO 0.02 MANUAL DIFF NOT IND 
TRIGLYCERIDES 107.0 mg/dLCHOLESTEROL 174.0 mg/dLHDL 43.0 mg/dLTOT CHOL/HDL 4.0 
LDL (CALC) 110.0 mg/dLGLUCOSE 90.0 mg/dLSODIUM 145.0 mmol/LPOTASSIUM 3.80 
mmol/LCHLORIDE 115.0 mmol/LCO2 24.0 mmol/LBUN 17.0 mg/dLCREATININE 1.30 
mg/dLSGOT/AST 18.0 IU/LSGPT/ALT 17.0 IU/LALK PHOS 56.0 IU/LTOTAL PROTEIN 6.70 
g/dLALBUMIN 3.90 g/dLTOTAL BILI 0.40 mg/dLCALCIUM 9.80 mg/dLAGE 64 GFR NonAA 41 
GFR AA 50 eGFR 41 eGFR AA* 50 

 

                          2015 8:50 AM        WBC 5.8 RBC 3.29 HGB 10.70 g/dLHCT 34.0 %.0 fLMCH 32.50

 pgMCHC 31.50 g/dLRDW SD 52 RDW CV 13.60 %MPV 9.60 fLPLT 223 NRBC# 0.00 NRBC% 
0.0 %NEUT 69.60 %%LYMP 18.90 %%MONO 8.50 %%EOS 2.80 %%BASO 0.20 %#NEUT 4.03 
#LYMP 1.09 #MONO 0.49 #EOS 0.16 #BASO 0.01 MANUAL DIFF NOT IND Lithium 0.620 
mmol/LGLUCOSE 83.0 mg/dLSODIUM 139.0 mmol/LPOTASSIUM 3.90 mmol/LCHLORIDE 109.0 
mmol/LCO2 22.0 mmol/LBUN 19.0 mg/dLCREATININE 1.40 mg/dLSGOT/AST 19.0 
IU/LSGPT/ALT 21.0 IU/LALK PHOS 55.0 IU/LTOTAL PROTEIN 6.50 g/dLALBUMIN 3.90 
g/dLTOTAL BILI 0.50 mg/dLCALCIUM 9.60 mg/dLAGE 65 GFR NonAA 38 GFR AA 46 eGFR 38
 eGFR AA* 46 TRIGLYCERIDES 121.0 mg/dLCHOLESTEROL 192.0 mg/dLHDL 51.0 mg/dLTOT 
CHOL/HDL 3.8 .0 mg/dLFREE T4 0.79 TSH 1.210 uIU/mLHemoglobin A1c 5.40 
%Estim. Avg Glu (eAG) 108 

 

                          3/15/2016 8:08 AM         VITAMIN B12 696.0 pg/mL

 

                          3/23/2016 8:26 AM         WBC 7.0 RBC 3.61 HGB 11.80 g/dLHCT 37.70 %.0 fLMCH 32.70

 pgMCHC 31.30 g/dLRDW SD 49 RDW CV 12.50 %MPV 10.0 fLPLT 207 NRBC# 0.00 NRBC% 
0.0 %NEUT 73.60 %%LYMP 16.40 %%MONO 6.60 %%EOS 3.0 %%BASO 0.30 %#NEUT 5.15 #LYMP
 1.15 #MONO 0.46 #EOS 0.21 #BASO 0.02 MANUAL DIFF NOT IND Lithium 0.940 
mmol/LGLUCOSE 108.0 mg/dLSODIUM 143.0 mmol/LPOTASSIUM 4.30 mmol/LCHLORIDE 110.0 
mmol/LCO2 27.0 mmol/LBUN 16.0 mg/dLCREATININE 1.60 mg/dLSGOT/AST 13.0 
IU/LSGPT/ALT 7.0 IU/LALK PHOS 71.0 IU/LTOTAL PROTEIN 6.80 g/dLALBUMIN 4.0 
g/dLTOTAL BILI 0.20 mg/dLCALCIUM 10.40 mg/dLAGE 65 GFR NonAA 32 GFR AA 39 eGFR 
32 eGFR AA* 39 TRIGLYCERIDES 113.0 mg/dLCHOLESTEROL 169.0 mg/dLHDL 42.0 mg/dLTOT
 CHOL/HDL 4.0 LDL (CALC) 104.0 mg/dLFREE T4 0.86 TSH 2.20 uIU/mLHemoglobin A1c 
5.20 %Estim. Avg Glu (eAG) 103 

 

                          3/25/2016 9:17 AM         COLOR YELLOW APPEARANCE CLEAR SPEC GRAV 1.010 pH 7.0 PROTEIN 

NEGATIVE GLUCOSE NEGATIVE mg/dLKETONE NEGATIVE BILIRUBIN NEGATIVE BLOOD NEGATIVE
 NITRITE NEGATIVE [place] SCREEN SMALL MICRO IND? SEE BELOW WBC/HPF 0-5 RBC/HPF 
NEGATIVE CASTS/LPF NEGATIVE /LPFCRYSTALS NEGATIVE MUCOUS THRDS NEGATIVE BACTERIA
 NEGATIVE EPITH CELLS FEW SQUAMOUS /HPFTRICHOMONAS NEGATIVE YEAST NEGATIVE 

 

                          2016 6:00 AM        GLUCOSE 91.0 mg/dLSODIUM 143.0 mmol/LPOTASSIUM 3.60 mmol/LCHLORIDE

 112.0 mmol/LCO2 23.0 mmol/LBUN 22.0 mg/dLCREATININE 1.20 mg/dLSGOT/AST 15.0 
IU/LSGPT/ALT 12.0 IU/LALK PHOS 61.0 IU/LTOTAL PROTEIN 5.40 g/dLALBUMIN 3.10 
g/dLTOTAL BILI 0.40 mg/dLCALCIUM 8.40 mg/dLAGE 66 GFR NonAA 45 GFR AA 55 eGFR 45
 eGFR AA* 55 WBC 3.0 RBC 3.05 HGB 9.80 g/dLHCT 32.10 %.0 fLMCH 32.10 
pgMCHC 30.50 g/dLRDW SD 54 RDW CV 14.20 %MPV 10.10 fLPLT 170 NRBC# 0.00 NRBC% 
0.0 %NEUT 50.70 %%LYMP 32.60 %%MONO 10.50 %%EOS 5.90 %%BASO 0.30 %#NEUT 1.54 
#LYMP 0.99 #MONO 0.32 #EOS 0.18 #BASO 0.01 MANUAL DIFF NOT IND 

 

                          2016 2:09 PM        COLOR YELLOW APPEARANCE CLEAR SPEC GRAV 1.010 pH 5.0 PROTEIN

 30 GLUCOSE NEGATIVE mg/dLKETONE NEGATIVE BILIRUBIN NEGATIVE BLOOD LARGE NITRITE
 NEGATIVE [facility] SCREEN MODERATE MICRO INDICATED? SEE BELOW WBC/HPF  RBC/HPF
 20-50 CASTS/LPF NEGATIVE /LPFCRYSTALS NEGATIVE MUCOUS THRDS NEGATIVE BACTERIA 
NEGATIVE EPITH CELLS FEW SQUAMOUS /HPFTRICHOMONAS NEGATIVE YEAST NEGATIVE CULT 
SET UP? YES 

 

                          1/3/2017 4:08 PM          COLOR YELLOW APPEARANCE HAZY SPEC GRAV 1.015 pH 6.0 PROTEIN 30

 GLUCOSE NEGATIVE mg/dLKETONE NEGATIVE BILIRUBIN NEGATIVE BLOOD MODERATE NITRITE
 NEGATIVE [facility] SCREEN LARGE MICRO INDICATED? SEE BELOW WBC/-200 RBC/HPF 
5-10 CASTS/LPF NEGATIVE /LPFCRYSTALS NEGATIVE MUCOUS THRDS NEGATIVE BACTERIA 
NEGATIVE EPITH CELLS 1+ SQUAMOUS /HPFTRICHOMONAS NEGATIVE YEAST NEGATIVE CULT 
SET UP? YES 

 

                          2017 4:24 PM         CREATININE 1.50 mg/dLAGE 66 GFR NonAA 35 GFR AA 42 eGFR 35 eGFR

 AA* 42 VITAMIN B12 1324.0 pg/mL







History Of Immunizations







       Name    Date Admin    Mfg Name    Mfg Code    Trade Name    Lot#    Route    Inj    Vis Given    Vis

 Pub                                    CVX

 

        Influenza    2008    Not Entered    NE      Not Entered            Not Entered    Not Entered

                    1            999

 

        X       12/19/2008    [employer]    MSD     Pneumovax 23            Intramuscular    Not Entered

                    1            999

 

           Influenza    10/15/2010    [employer]    NOV        Fluvirin > 12 Years    

[id number]     Intramuscular    Left Deltoid    10/15/2010    2009    999

 

          X         3/23/2015    [facility]    [facility]       Prevnar 13    [id number]    Intramuscular

                Right Gluteous Medius    3/23/2015       2013       109







History of Past Illness







                    Name                Date of Onset       Comments

 

                    Peritoneal Neoplasm, Malignant                         

 

                    Hyperlipidemia                           

 

                    Bipolar disorder, unspecified                         

 

                    Artificial opening status; colostomy                         

 

                    B12 deficiency                           

 

                    Anemia, Pernicious                         

 

                    Arthritis unspecified                         

 

                    cervical cancer                          

 

                    Artificial opening status; colostomy    2010  1:10PM     

 

                    Bipolar disorder, unspecified    2010  1:10PM     

 

                    Hyperlipidemia      2010  1:10PM     

 

                    Anemia, Pernicious    2010  1:10PM     

 

                    Postoperative Follow-Up    2010  1:55PM     

 

                    Postoperative Follow-Up    Mar  8 2010 10:57AM     

 

                    Artificial opening status; colostomy    Mar  8 2010  1:19PM     

 

                    Peritoneal Neoplasm, Malignant    Mar  8 2010  1:19PM     

 

                    Artificial opening status; colostomy    2010  1:40PM     

 

                    Hyperlipidemia      2010  1:40PM     

 

                    Anemia, Pernicious    2010  1:40PM     

 

                    Peritoneal Neoplasm, Malignant    2010  1:40PM     

 

                    Arthritis unspecified    2010  1:40PM     

 

                    Anemia of Chronic Illness    2010  1:40PM     

 

                    Tinea corporis      2010  3:17PM     

 

                    Bipolar disorder, unspecified    2010  1:33PM     

 

                    Hyperlipidemia      2010  1:33PM     

 

                    Anemia, Pernicious    2010  1:33PM     

 

                    Peritoneal Neoplasm, Malignant    2010  1:33PM     

 

                    B12 deficiency      2010  1:33PM     

 

                    Ethmoidal Sinusitis, Acute    Sep 21 2010  3:53PM     

 

                    Wheezing            Sep 21 2010  3:53PM     

 

                    Flu                 Oct 15 2010  1:40PM     

 

                    Bipolar disorder, unspecified    Oct 15 2010  1:42PM     

 

                    Hyperlipidemia      Oct 15 2010  1:42PM     

 

                    Anemia, Pernicious    Oct 15 2010  1:42PM     

 

                    Peritoneal Neoplasm, Malignant    Oct 15 2010  1:42PM     

 

                    Bipolar disorder, unspecified    2011 12:01PM     

 

                    Hyperlipidemia      2011 12:01PM     

 

                    Anemia, Pernicious    2011 12:01PM     

 

                    Peritoneal Neoplasm, Malignant    2011 12:01PM     

 

                    Bipolar disorder, unspecified    Apr 15 2011 10:55AM     

 

                    Major Depression    2011 10:11AM     

 

                    Bipolar Disorder    2011 10:11AM     

 

                    Cancer              May 10 2011  4:16PM     

 

                    Major Depression    May 10 2011  3:16PM     

 

                    Bipolar Disorder    May 10 2011  3:16PM     

 

                    Hypercalcemia       May 23 2011  2:47PM     

 

                    Bipolar disorder, unspecified    May 23 2011  2:47PM     

 

                    Colon Cancer, Personal History    May 23 2011  2:47PM     

 

                    Bipolar Disorder    May 31 2011  4:39PM     

 

                    Depressive Disorder    2011 10:01AM     

 

                    Vitamin B12 deficiency    2011 10:01AM     

 

                    Vitamin D Deficiency    2011  5:07PM     

 

                    Anemia, Vitamin B12 Deficiency    2011  5:07PM     

 

                    B12 deficiency      2011  3:56PM     

 

                    Routine gynecological examination    Aug  4 2011  9:08AM     

 

                    Screening Examination for Breast Cancer    Aug  4 2011  9:08AM     

 

                    Tinea Corporis      Aug  4 2011  9:08AM     

 

                    Depressive Disorder    Sep 23 2011  8:47AM     

 

                    Contact Dermatitis    Sep 23 2011  8:47AM     

 

                    Anemia, Pernicious    Sep 23 2011  8:47AM     

 

                    B12 deficiency      Sep 23 2011  8:47AM     

 

                    B12 deficiency      Sep 27 2011  2:58PM     

 

                    B12 deficiency      Oct 20 2011  2:34PM     

 

                    Flu                 Dec  9 2011  3:16PM     

 

                    B12 deficiency      Dec  9 2011  3:17PM     

 

                    B12 deficiency      2012  4:52PM     

 

                    B12 deficiency      Feb 2012 11:10AM     

 

                    B12 deficiency      2012  3:37PM     

 

                    B12 deficiency      May  3 2012  4:10PM     

 

                    B12 deficiency      2012  2:54PM     

 

                    B12 deficiency      2012 11:23AM     

 

                    B12 deficiency      Aug  9 2012  2:08PM     

 

                    B12 deficiency      Sep  6 2012  4:36PM     

 

                    B12 deficiency      Oct 16 2012 10:23AM     

 

                    Flu                 Feb  4   3:11PM     

 

                    Bipolar disorder, unspecified    Feb  2013  2:48PM     

 

                    Anemia, Pernicious    Feb  4   2:48PM     

 

                    B12 deficiency      Feb  4   2:48PM     

 

                    Extrapyramidal abnormal movement disorder    Feb  4   2:48PM     

 

                    B12 deficiency      Apr  3 2013 12:03PM     

 

                    Bipolar disorder, unspecified    May  7 2013  1:31PM     

 

                    Anemia, Pernicious    May  7 2013  1:31PM     

 

                    B12 deficiency      May  7 2013  1:31PM     

 

                    Extrapyramidal abnormal movement disorder    May  7 2013  1:31PM     

 

                    B12 deficiency      2013  3:42PM     

 

                    B12 deficiency      2013  1:31PM     

 

                    Hyperlipidemia      Aug  7 2013 10:37AM     

 

                    Vitamin D Deficiency    Aug  7 2013 10:37AM     

 

                    Bipolar disorder, unspecified    Aug  7 2013 10:37AM     

 

                    Anemia, Pernicious    Aug  7 2013 10:37AM     

 

                    B12 deficiency      Aug  7 2013 10:37AM     

 

                    B12 deficiency      Sep 25 2013 11:15AM     

 

                    B12 deficiency      Dec 11 2013  3:16PM     

 

                    B12 deficiency      Mar  6 2014  1:48PM     

 

                    B12 deficiency      May 21 2014  3:17PM     

 

                    Screening Examination for Breast Cancer    2014  3:23PM     

 

                    Periumbilical abdominal pain    2014  3:23PM     

 

                    B12 deficiency      Jul 10 2014  2:52PM     

 

                    Anemia, Vitamin B12 Deficiency    Aug 13 2014  4:50PM     

 

                    Bipolar disorder    Oct 16 2014 11:13AM     

 

                    Hyperlipidemia      Oct 16 2014 11:13AM     

 

                    Anemia, Pernicious    Oct 16 2014 11:13AM     

 

                    Peritoneal Neoplasm, Malignant    Oct 16 2014 11:13AM     

 

                    Screening breast examination    Oct 16 2014 11:13AM     

 

                    Weight loss         Oct 16 2014 11:13AM     

 

                    Anemia, Pernicious    Mar 23 2015  2:57PM     

 

                    B12 deficiency      Mar 23 2015  2:57PM     

 

                    Need for Prevnar vaccine    Mar 23 2015  2:57PM     

 

                    Bipolar disorder    Mar 23 2015  2:57PM     

 

                    Hyperlipidemia      Mar 23 2015  2:57PM     

 

                    Anemia, Pernicious    Mar 23 2015  2:57PM     

 

                    Peritoneal Neoplasm, Malignant    Mar 23 2015  2:57PM     

 

                    B12 deficiency      May  4 2015  4:48PM     

 

                    Hyperlipidemia      May 13 2015  9:56AM     

 

                    Anemia              May 13 2015  9:56AM     

 

                    Bipolar disorder    May 13 2015  9:56AM     

 

                    Bipolar disorder    May 14 2015  3:27PM     

 

                    Hyperlipidemia      May 14 2015  3:27PM     

 

                    Anemia, Pernicious    May 14 2015  3:27PM     

 

                    Peritoneal Neoplasm, Malignant    May 14 2015  3:27PM     

 

                    B12 deficiency      2015  2:20PM     

 

                    B12 deficiency      2015 11:34AM     

 

                    B12 deficiency      Aug 18 2015  9:06AM     

 

                    Tinea Corporis      Sep 18 2015  8:54AM     

 

                    B12 deficiency      Sep 18 2015  8:54AM     

 

                    B12 deficiency      2015 10:28AM     

 

                    Herpes zoster without complication    Dec  3 2015  9:52AM     

 

                    B12 deficiency      Dec 23 2015 11:21AM     

 

                    B12 deficiency      2016  4:51PM     

 

                    Vitamin B 12 deficiency    Mar 14 2016  5:35PM     

 

                    B12 deficiency      Mar 15 2016 12:14PM     

 

                    B12 deficiency      May  5 2016 11:30AM     

 

                    Edema               May  5 2016 11:30AM     

 

                    Dermatitis          May  5 2016 11:30AM     

 

                    Edema               May 17 2016  8:38AM     

 

                    Shortness of breath    May 17 2016  8:38AM     

 

                    Bilateral edema of lower extremity    2016  2:06PM     

 

                    B12 deficiency      2016  2:06PM     

 

                    B12 deficiency      2016 11:50AM     

 

                    B12 deficiency      2016 11:20AM     

 

                    Diarrhea            Aug  2 2016  3:13PM     

 

                    B12 deficiency      Aug 24 2016 11:10AM     

 

                    Encounter for screening mammogram for breast cancer    Aug 24 2016 11:44AM     

 

                    B12 deficiency      Sep 28 2016  2:35PM     

 

                    B12 deficiency      Dec 15 2016  2:02PM     

 

                    Dysuria             Dec 29 2016 12:14PM     

 

                    Hematuria           Fidencio  3 2017  1:33PM     

 

                    Constipation by delayed colonic transit    2017  1:52PM     

 

                    Ileus               2017  1:52PM     

 

                    UTI (urinary tract infection)    Fidencio 15 2017  3:39PM     

 

                    Acute cystitis with hematuria    2017 11:07AM     

 

                    B12 deficiency      2017 11:07AM     

 

                    B12 deficiency      2017 11:40AM     

 

                    B12 deficiency      2017  4:07PM     

 

                    Slurred speech      2017  3:07PM     

 

                    Vitamin B12 deficiency    2017  3:07PM     

 

                    Dysphagia, unspecified type    2017  3:07PM     

 

                    Hyperlipidemia      2017  3:07PM     







Payers







           Insurance Name    Company Name    Plan Name    Plan Number    Policy Number    Policy Group

 Number                                 Start Date

 

                    Medicare RHC Medicare RHC              [id number]              N/A

 

                          [employer]    [employer]                 [id number]

                                                    

 

                    Medicare Part A    Medicare - Lab/Xray              [id number]              2006

 

                    Medicare Part B    Medicare [place]              [id number]              2006

 

                          [employer]    [employer]                 50 percent

                                                    2009

 

                    [facility]    [employer]              [id number]              N/A

 

                    Medicare Part A    Medicare Part A              [id number]              N/A

 

                    Medicare Part A    Medicare Part A              [id number]              2006









History of Encounters







                    Visit Date          Visit Type          Provider

 

                    2017           Office visit         

 

                    2017           Office visit        Bhupinder Louise DO

 

                    2017           Nurse visit         Bhupinder Louise DO

 

                    2017           Office visit        Radha Ontiveros APRN

 

                    1/15/2017           Office visit        Aj Tapia NP

 

                    2017            Office visit        Devin Masterson MD

 

                    2016          [facility]            Devin Masterson MD

 

                    12/15/2016          Nurse visit         Bhupinder Louise DO

 

                    2016           Nurse visit         Bret Forte APRN

 

                    2016           Nurse visit         Bhupinder Louise DO

 

                    2016            Office visit        Bhupinder Louise DO

 

                    2016           Nurse visit         Bhupinder Louise DO

 

                    2016           Office visit        Bret Forte APRFIORELLA

 

                    2016            Office visit        Bhupinder Louise DO

 

                    3/15/2016           Nurse visit         Bhupinder Louise DO

 

                    2016            Nurse visit         Bhupinder Louise DO

 

                    2015          Nurse visit         Bhupinder Louise DO

 

                    12/3/2015           Office visit        Bhupinder Louise DO

 

                    2015          Nurse visit         Bhupinder Louise DO

 

                    2015           Office visit        Bhupinder Louise DO

 

                    2015           Nurse visit         Bhupinder Aspen DO

 

                    2015            Nurse visit         Bhupinder Aspen DO

 

                    2015            Nurse visit         Bhupinder Aspen DO

 

                    2015           Office visit        Bhupinder Aspen DO

 

                    2015            Nurse visit         Bhupinder Aspen DO

 

                    3/23/2015           Office visit        Bhupinder Aspen DO

 

                    10/16/2014          Office visit        Bhupinder Aspen DO

 

                    2014           Nurse visit         Radha Ontiveros APRN

 

                    7/10/2014           Nurse visit         Bhupinder Aspen DO

 

                    2014           Office visit        Bhupinder Aspen DO

 

                    2014           Nurse visit         Bhupinder Aspen DO

 

                    3/6/2014            Nurse visit         Bhupinder Aspen DO

 

                    2014            Yasmine Lopez MD

 

                    2013          Nurse visit         Bhupinder Aspen DO

 

                    2013           Nurse visit         Bhupinder Aspen DO

 

                    2013            Office visit        Bhupinder Aspen DO

 

                    2013            Nurse visit         Bhupinder Aspen DO

 

                    2013            Nurse visit         Bhupinder Aspen DO

 

                    2013            Office visit        Bhupinder Aspen DO

 

                    4/3/2013            Nurse visit         Bhupinder Aspen DO

 

                    2013            Office visit        Bhupinder Aspen DO

 

                    10/16/2012          Nurse visit         Bhupinder Aspen DO

 

                    2012            Nurse visit         Bhupinder Aspen DO

 

                    2012            Voided              Bhupinder Aspen DO

 

                    2012            Nurse visit         Bhupinder Aspen DO

 

                    2012            Nurse visit         Bhupinder Aspen DO

 

                    2012           Nurse visit         Bhupinder Aspen DO

 

                    5/3/2012            Nurse visit         Bhupinder Aspen DO

 

                    2012           Nurse visit         Bhupinder Aspen DO

 

                    2012           Nurse visit         Bhupinder Aspen DO

 

                    2012           Nurse visit         Bhupinder Aspen DO

 

                    2011           Nurse visit         Bhupinder Aspen DO

 

                    10/20/2011          Nurse visit         Bhupinder Aspen DO

 

                    2011           Office visit        Bhupinder Aspen DO

 

                    2011           Nurse visit         Radha Ontiveros APRN

 

                    2011            Office visit        Bhupinder Aspen DO

 

                    2011           Nurse visit         Bhupinder Aspen DO

 

                    2011            Office visit        Bhupinder Aspen DO

 

                    2011           Office visit        Bhupinder Louise DO

 

                    5/10/2011           Office visit        Bhupinder Louise DO

 

                    2011           Office visit        Bhupinder Louise DO

 

                    4/15/2011           Office visit        Devin Angel DO

 

                    2011           Office visit        Devin Angel DO

 

                    10/15/2010          Office visit        Devin Angel DO

 

                    2010           Office visit        Devin Angel DO

 

                    2010            Office visit        Devin Angel DO

 

                    2010           Office visit        Devin Angel DO

 

                    2010            Office visit        Devin Angel DO

 

                    3/8/2010            Office visit        Devin Masterson MD

 

                    2010            Surgery             Devin Masterson MD

 

                    2010            Office visit        Devin Angel DO

 

                    2010           Surgery             Devin Masterson MD

 

                    2010           Hospital            Devin Masterson MD

 

                    2010           Hospital            Devin Masterson MD

 

                    10/22/2009          Office visit        Devin Angel DO

## 2019-06-26 NOTE — XMS REPORT
MU2 Ambulatory Summary

                             Created on: 2016



Pauline Gan

External Reference #: [id number]

: 1950

Sex: Female



Demographics







                          Address                   [address]

[place]  [id number]

 

                          Home Phone                [phone]

 

                          Preferred Language        English

 

                          Marital Status            Legally 

 

                          Sikhism Affiliation     Unknown

 

                          Race                      White

 

                          Ethnic Group              Not  or 





Author







                          Author                    Bhupinder Louise

 

                          [facility]

 

                          Address                   [address]

[place]  [phone]



 

                          Phone                     [phone]







Care Team Providers







                    Care Team Member Name    Role                Phone

 

                    Bhupinder Louise    PCP                 Unavailable







Allergies and Adverse Reactions







                    Name                Reaction            Notes

 

                    NO KNOWN DRUG ALLERGIES                         







Plan of Treatment







             Planned Activity    Comments     Planned Date    Planned Time    Plan/Goal

 

             TTE W/DOPPLER COMPLETE                 2016    12:00 AM      

 

             COMPLETE CBC W/AUTO DIFF WBC                 2013     12:00 AM      

 

             ASSAY OF LITHIUM                 2013     12:00 AM      

 

             METABOLIC PANEL TOTAL CA                 2013     12:00 AM      

 

             VITAMIN B-12                 2013     12:00 AM      

 

             ASSAY OF FOLIC ACID SERUM                 2013     12:00 AM      

 

             THER/PROPH/DIAG INJ SC/IM                 2013    12:00 AM      







Medications







                                        Active 

 

             Name         Start Date    Estimated Completion Date    SIG          Comments

 

                Latuda 20 mg oral tablet                                    take 1 tablet (20 mg) by oral route once daily with

 food (at least 350 calories)            

 

             pravastatin 40 mg oral tablet    3/30/2015                 TAKE 1 TABLET BY MOUTH DAILY     

 

                Namenda XR 28 mg oral capsule,sprinkle,ER 24hr    2015                       take 1 capsule (28

 mg) by oral route once daily            

 

                Namenda XR 28 mg oral capsule,sprinkle,ER 24hr    2016                       take 1 capsule (28

 mg) by oral route once daily            

 

                triamcinolone acetonide 0.1 % topical cream    2016                        apply a thin layer to 

the affected area(s) by topical route 2 times per day     

 

                furosemide 40 mg oral tablet    2016      take 1 tablet (40 mg) by oral

 route once daily                        

 

                potassium chloride 10 mEq oral tablet extended release    2016                       take 1 tablet

 (10 meq) by oral route once daily       









                                         

 

             Name         Start Date    Expiration Date    SIG          Comments

 

             Reglan 10mg    3/29/2010    2010    one ac and hs     

 

                Keflex 500 mg oral capsule    2010       10/1/2010       take 1 capsule (500 mg) by oral

 route every 6 hours for 10 days         

 

                Bactrim -160 mg oral tablet    2011       take 1 tablet by oral route

 every 12 hours for 7 days               

 

                triamcinolone acetonide 0.1 % topical cream    2011      apply a thin

 layer to the affected area(s) by topical route 2 times per day     

 

                sertraline 100 mg oral tablet    4/10/2012       5/10/2012       take 1.5 tablets by oral route

 daily for 30 days                       

 

                ergocalciferol (vitamin D2) 50,000 unit oral capsule    4/15/2013       2013       TAKE

 ONE CAPSULE BY MOUTH ONCE A WEEK        

 

                CYANOCOBALAM 1000MCGINJ 1000 milliliter    2013       INJECT 1ML INTRAMUSCULAR

 ONCE A MONTH                            

 

                pravastatin 40 mg oral tablet    3/25/2014       3/20/2015       TAKE ONE TABLET BY MOUTH EVERY

 DAY                                     

 

                          Zostavax (PF) 19,400 unit/0.65 mL subcutaneous suspension for reconstitution    3/23/2015

                    3/24/2015           inject 0.65 milliliter by subcutaneous route once     

 

                lithium carbonate 300 mg oral capsule    2015       take 1 capsule (300

 mg) by oral route 2 for 30 days         

 

                famciclovir 500 mg oral tablet    12/3/2015       12/10/2015      take 1 tablet (500 mg) by

 oral route every 8 hours for 7 days     









                                        Discontinued 

 

             Name         Start Date    Discontinued Date    SIG          Comments

 

                Tylenol 325 mg oral tablet                    2013        take 1 - 2 tablets (325 -650 mg) by oral

 route every 4-6 hours as needed         

 

                Calcium 600 + D(3) 600 mg(1,500mg) -400 unit oral tablet                    2011       take 1 tablet

 by oral route 2 times a day            no longer taking

 

                Vitamin B-12 1,000 mcg oral tablet extended release    2010       take 1

 tablet by oral route daily             no longer taking

 

                Antifungal (clotrimazole) 1 % topical cream    2010       apply to the 

affected and surrounding areas of skin by topical route 2 times per day morning 
and evening                              

 

                sertraline 100 mg oral tablet    5/10/2011       2011       take 2 tablets (200 mg) by 

oral route once daily                   discontinued by Dr. Serrano

 

                mirtazapine 15 mg oral tablet                    2011        take 1 tablet (15 mg) by oral route 

once daily before bedtime               Dr. Serrano

 

                mirtazapine 15 mg oral tablet                    2011        take 1 tablet (15 mg) by oral route 

once daily before bedtime               dc'd by Dr. Serrano

 

                Pristiq 50 mg oral tablet extended release 24 hr                    2013        take 1 tablet (50

 mg) by oral route once daily           Dr. Serrano

 

                Pristiq 50 mg oral tablet extended release 24 hr                    2013        take 1 tablet (50

 mg) by oral route once daily           dose updated

 

                Vitamin B-12 1,000 mcg/mL injection solution    2011        inject 1 milliliter

 (1,000 mcg) by intramuscular route once a month    on list already

 

                    syringe with needle 1 mL 25 gauge x 1" miscellaneous syringe    2011

                          use for injection once a month     

 

                clotrimazole 1 % topical cream    2011        apply to the affected and surrounding

 areas of skin by topical route 2 times per day in the morning and evening     

 

                Vitamin D2 50,000 unit oral capsule    2011        take 1 capsule (50,000

 unit) by oral route once weekly        generic on list

 

                Pravachol 40 mg oral tablet    2012        take 1 tablet (40 mg) by oral 

route once daily for 90 days            generic on list

 

                lithium carbonate 300 mg oral capsule    2012        take 1 capsule by oral

 route daily                            dose updated

 

                Pristiq 100 mg oral tablet extended release 24 hr                    4/10/2012       take 1 and 1/2 

tablet (150 mg) by oral route once daily    Mental Health provider

 

                Pristiq 100 mg oral tablet extended release 24 hr                    4/10/2012       take 1 and 1/2 

tablet (150 mg) by oral route once daily    Discontinued by Dr Efrain Knight at [facility]

 

                hydroxyzine HCl 50 mg oral tablet    10/16/2014      2015       take 1 tablet (50 mg) 

by oral route at bedtime                 

 

                fluconazole 100 mg oral tablet    2015       12/3/2015       take 1 tablet (100 mg) by 

oral route once a week                   

 

                ketoconazole 2 % topical cream    2015       12/3/2015       apply to the affected area(s)

 by topical route 2 times per day        

 

                prednisone 10 mg oral tablet    12/3/2015       2016        take 2 tablets (20 mg) by oral

 route once daily for 4 days 1 tablet daily for 4 days 0.5 tablet daily for 4 
days                                     







Problem List







                    Description         Status              Onset

 

                    Artificial opening status; colostomy    Active               

 

                    Bipolar disorder, unspecified    Active               

 

                    Hyperlipidemia      Active               

 

                    Peritoneal Neoplasm, Malignant    Active               

 

                    Anemia, Pernicious    Active               

 

                    Arthritis unspecified    Active               

 

                    B12 deficiency      Active               







Vital Signs







      Date    Time    BP-Sys(mm[Hg]    BP-Lynn(mm[Hg])    HR(bpm)    RR(rpm)    Temp    WT    HT    HC    BMI

                    BSA                 BMI Percentile      O2 Sat(%)

 

        2016    11:27:00 AM    148 mmHg    78 mmHg    90 bpm    20 rpm    98.2 F    153 lbs    69 in

                          22.59 kg/m2    1.84 m2                   96 %

 

        12/3/2015    9:50:00 AM    132 mmHg    70 mmHg    62 bpm    16 rpm    97.9 F    145 lbs    69 in

                          21.4125 kg/m    1.7894 m                 100 %

 

        2015    8:52:00 AM    132 mmHg    68 mmHg    52 bpm    20 rpm    97.8 F    141 lbs    69 in

                          20.82 kg/m2    1.76 m2                   100 %

 

        2015    3:25:00 PM    120 mmHg    62 mmHg    72 bpm    16 rpm    98.1 F    136 lbs    69 in

                          20.0835 kg/m    1.733 m                 98 %

 

       3/23/2015    2:55:00 PM    130 mmHg    76 mmHg    68 bpm    18 rpm    97 F    140 lbs    69 in    

                20.67 kg/m2     1.76 m2                         98 %

 

        10/16/2014    11:11:00 AM    120 mmHg    66 mmHg    77 bpm    20 rpm    98 F    130 lbs    69 in

                          19.1974 kg/m    1.6943 m                 100 %

 

        2014    3:21:00 PM    130 mmHg    66 mmHg    63 bpm    18 rpm    97.2 F    160 lbs    69 in

                          23.63 kg/m2    1.88 m2                   99 %

 

        2013    10:35:00 AM    132 mmHg    70 mmHg    66 bpm    20 rpm    98.1 F    157 lbs    69 in

                          23.1846 kg/m    1.862 m                  

 

        2013    1:29:00 PM    132 mmHg    70 mmHg    76 bpm    18 rpm    98.2 F    166 lbs    69 in 

                          24.51 kg/m2    1.91 m2                    

 

       2013    2:46:00 PM    128 mmHg    70 mmHg    76 bpm    16 rpm    98 F    160 lbs    69 in     

                23.6276 kg/m    1.8797 m                       

 

        2011    8:49:00 AM    128 mmHg    78 mmHg    70 bpm    18 rpm    97.9 F    164 lbs    69 in

                          24.22 kg/m2    1.90 m2                    

 

     2011    1:31:00 PM    132 mmHg    68 mmHg    84 bpm         97 F    167 lbs                        

                                         

 

        2011    9:09:00 AM    128 mmHg    70 mmHg    72 bpm    18 rpm    98.2 F    163 lbs    64 in 

                          27.98 kg/m2    1.83 m2                    

 

       2011    10:01:00 AM    132 mmHg    70 mmHg    72 bpm    18 rpm    98.2 F    154 lbs             

                                                                 

 

       2011    2:47:00 PM    128 mmHg    70 mmHg    72 bpm    18 rpm    97.8 F    156 lbs             

                                                                 

 

       5/10/2011    3:16:00 PM    144 mmHg    80 mmHg    72 bpm    18 rpm    98.2 F    158 lbs             

                                                                 

 

        2011    10:11:00 AM    132 mmHg    70 mmHg    70 bpm    18 rpm    98.2 F    168 lbs    69 in

                          24.81 kg/m2    1.93 m2                    

 

        4/15/2011    10:52:00 AM    110 mmHg    60 mmHg    75 bpm    16 rpm    97.5 F    172.375 lbs    

69 in                     25.4551 kg/m    1.951 m                 100 %

 

        2011    11:43:00 AM    120 mmHg    82 mmHg    75 bpm    16 rpm    97.2 F    178.5 lbs    69

 in                       26.36 kg/m2    1.99 m2                   100 %

 

        10/15/2010    1:32:00 PM    120 mmHg    70 mmHg    80 bpm    18 rpm    96.6 F    177 lbs    69 in

                          26.1381 kg/m    1.977 m                 100 %

 

        2010    3:50:00 PM    168 mmHg    100 mmHg    82 bpm    18 rpm    97.8 F    177.5 lbs    69

 in                       26.21 kg/m2    1.98 m2                   97 %

 

        2010    1:21:00 PM    140 mmHg    80 mmHg    59 bpm    16 rpm    97.6 F    173.25 lbs    69 

in                        25.5843 kg/m    1.956 m                 100 %

 

        2010    3:02:00 PM    140 mmHg    80 mmHg    61 bpm    16 rpm    97.6 F    173.125 lbs    69

 in                       25.57 kg/m2    1.96 m2                   99 %

 

        2010    1:23:00 PM    130 mmHg    80 mmHg    66 bpm    16 rpm    96.8 F    173 lbs    69 in 

                          25.5474 kg/m    1.9546 m                 100 %

 

        2010    12:58:00 PM    130 mmHg    88 mmHg    75 bpm    16 rpm    98.4 F    172.25 lbs    69

 in                       25.44 kg/m2    1.95 m2                   100 %







Social History







                    Name                Description         Comments

 

                    denies alcohol use                         

 

                    denies smoking                           

 

                    Denies illicit substance abuse                         

 

                    retired                                 direct care

 

                    Single                                   

 

                    Exercises regularly                         

 

                    Attended some college                         

 

                    Tobacco             Never smoker         







History of Procedures







                    Date Ordered        Description         Order Status

 

                    2010 12:00 AM    COMPREHEN METABOLIC PANEL    Reviewed

 

                    2010 12:00 AM    COMPLETE CBC W/AUTO DIFF WBC    Reviewed

 

                    2010 12:00 AM    LIPID PANEL         Reviewed

 

                          2015 12:00 AM        B12 Injection, Up to 1000 Mcg NDC#2189-9295-40 [facility] Medicare 

                                        Reviewed

 

                    2011 12:00 AM    MAMMOGRAM SCREENING    Reviewed

 

                    2011 12:00 AM    CYTOPATH C/V THIN LAYER    Reviewed

 

                    2011 12:00 AM    B12 Injection 1 cc NDC#[address]-62    Reviewed

 

                    2015 12:00 AM    THER/PROPH/DIAG INJ SC/IM    Reviewed

 

                    2015 12:00 AM    B12 Injection, Up to 1000 Mcg NDC#9433-3543-09    Reviewed

 

                    2011 12:00 AM    THER/PROPH/DIAG INJ SC/IM    Reviewed

 

                    2011 12:00 AM    B12 Injection([facility]) Ndc#9080-0298-03-    Reviewed

 

                    2015 12:00 AM    THER/PROPH/DIAG INJ SC/IM    Reviewed

 

                    2015 12:00 AM    B12 Injection, Up to 1000 Mcg NDC#6206-4324-46    Reviewed

 

                    10/20/2011 12:00 AM    THER/PROPH/DIAG INJ SC/IM    Reviewed

 

                    10/20/2011 12:00 AM    B12 Injection([facility]) Ndc#0728-1197-21-    Reviewed

 

                    2016 12:00 AM    THER/PROPH/DIAG INJ SC/IM    Reviewed

 

                    2016 12:00 AM    B12 Injection, Up to 1000 Mcg NDC#0901-8339-74    Reviewed

 

                    3/14/2016 12:00 AM    VITAMIN B-12        Reviewed

 

                    3/15/2016 12:00 AM    THER/PROPH/DIAG INJ SC/IM    Reviewed

 

                    3/15/2016 12:00 AM    B12 Injection, Up to 1000 Mcg NDC#5116-4849-55    Reviewed

 

                    2011 12:00 AM    ***Immunization administration, Medicare flu    Reviewed

 

                    2011 12:00 AM    Fluzone ** MEDICARE Only **    Reviewed

 

                    2011 12:00 AM    THER/PROPH/DIAG INJ SC/IM    Reviewed

 

                    2011 12:00 AM    B12 Injection ([facility]) Ndc#6647-8956-28    Reviewed

 

                    2016 12:00 AM    B12 Injection, Up to 1000 Mcg NDC#0323-9528-60 [facility] Medicare    

Reviewed

 

                    2012 12:00 AM    THER/PROPH/DIAG INJ SC/IM    Reviewed

 

                    2012 12:00 AM    B12 Injection ([employer] [facility]) Ndc#9689-4719-15    Reviewed

 

                    2012 12:00 AM    THER/PROPH/DIAG INJ SC/IM    Reviewed

 

                    2012 12:00 AM    B12 Injection([facility]) Ndc#5040-8187-59-    Reviewed

 

                    5/3/2012 12:00 AM    THER/PROPH/DIAG INJ SC/IM    Reviewed

 

                    5/3/2012 12:00 AM    B12 Injection([facility]) Ndc#9625-3716-47-    Reviewed

 

                    2012 12:00 AM    IMMUNOTHERAPY INJECTIONS    Reviewed

 

                    2012 12:00 AM    B12 Injection([facility]) Ndc#3332-5561-05-    Reviewed

 

                    2012 12:00 AM    THER/PROPH/DIAG INJ SC/IM    Reviewed

 

                    2012 12:00 AM    B12 Injection, Up to 1000 Mcg NDC#7766-6598-50    Reviewed

 

                    2012 12:00 AM    THER/PROPH/DIAG INJ SC/IM    Reviewed

 

                    2012 12:00 AM    B12 Injection, Up to 1000 Mcg NDC#6028-5553-79    Reviewed

 

                    2012 12:00 AM    THER/PROPH/DIAG INJ SC/IM    Reviewed

 

                    2012 12:00 AM    B12 Injection, Up to 1000 Mcg NDC#7545-5921-81    Reviewed

 

                    10/16/2012 12:00 AM    THER/PROPH/DIAG INJ SC/IM    Reviewed

 

                    10/16/2012 12:00 AM    B12 Injection, Up to 1000 Mcg NDC#3641-8797-97    Reviewed

 

                    2010 12:00 AM    COMPREHEN METABOLIC PANEL    Reviewed

 

                    2010 12:00 AM    COMPLETE CBC W/AUTO DIFF WBC    Reviewed

 

                    2010 12:00 AM    LIPID PANEL         Reviewed

 

                    2013 12:00 AM    Flu Injection 3 Years And Above NDC# [address]-60  [facility]    Reviewed



 

                    2013 12:00 AM    COMPLETE CBC W/AUTO DIFF WBC    Reviewed

 

                    2013 12:00 AM    ASSAY OF LITHIUM    Reviewed

 

                    2013 12:00 AM    METABOLIC PANEL TOTAL CA    Reviewed

 

                    4/3/2013 12:00 AM    THER/PROPH/DIAG INJ SC/IM    Reviewed

 

                    4/3/2013 12:00 AM    B12 Injection, Up to 1000 Mcg NDC#1355-9545-24    Reviewed

 

                    2013 12:00 AM    THER/PROPH/DIAG INJ SC/IM    Reviewed

 

                    2013 12:00 AM    B12 Injection, Up to 1000 Mcg NDC#7097-3703-70    Reviewed

 

                    2013 12:00 AM    THER/PROPH/DIAG INJ SC/IM    Reviewed

 

                    2013 12:00 AM    B12 Injection, Up to 1000 Mcg NDC#7910-8184-14    Reviewed

 

                    2013 12:00 AM    LIPID PANEL         Reviewed

 

                    2013 12:00 AM    VITAMIN D 25 HYDROXY    Reviewed

 

                    2013 12:00 AM    THER/PROPH/DIAG INJ SC/IM    Reviewed

 

                    3/6/2014 12:00 AM    THER/PROPH/DIAG INJ SC/IM    Reviewed

 

                    2014 12:00 AM    THER/PROPH/DIAG INJ SC/IM    Reviewed

 

                    2014 12:00 AM    B12 Injection, Up to 1000 Mcg NDC#8041-2287-01    Reviewed

 

                    2010 12:00 AM    SKIN FUNGI CULTURE    Reviewed

 

                    10/9/2010 12:00 AM    COMPREHEN METABOLIC PANEL    Reviewed

 

                    10/9/2010 12:00 AM    LIPID PANEL         Reviewed

 

                    2010 12:00 AM    THER/PROPH/DIAG INJ SC/IM    Reviewed

 

                    2010 12:00 AM    B12 Injection Ndc#[address]-90 ([facility])    Reviewed

 

                    2010 12:00 AM    THER/PROPH/DIAG INJ SC/IM    Reviewed

 

                    2010 12:00 AM    Kenalog 40 Mg Im-Ndc#[address]-18 ([facility])    Reviewed

 

                    10/15/2010 12:00 AM    FLU VACCINE 3 YRS & > IM    Reviewed

 

                    10/15/2010 12:00 AM    Admin.Of M/C Cov.Vaccine-Flu Vacc.    Reviewed

 

                    1/15/2011 12:00 AM    COMPLETE CBC W/AUTO DIFF WBC    Reviewed

 

                    1/15/2011 12:00 AM    COMPREHEN METABOLIC PANEL    Reviewed

 

                    1/15/2011 12:00 AM    LIPID PANEL         Reviewed

 

                    2014 12:00 AM    MAMMOGRAM SCREENING    Reviewed

 

                    2014 12:00 AM    Screening mammography, bilateral    Reviewed

 

                    7/10/2014 12:00 AM    THER/PROPH/DIAG INJ SC/IM    Reviewed

 

                    7/10/2014 12:00 AM    B12 Injection, Up to 1000 Mcg NDC#5102-5088-81    Reviewed

 

                    2011 12:00 AM    COMPLETE CBC W/AUTO DIFF WBC    Reviewed

 

                    2011 12:00 AM    COMPREHEN METABOLIC PANEL    Reviewed

 

                    2011 12:00 AM    LIPID PANEL         Reviewed

 

                    2014 12:00 AM    B12 Injection, Up to 1000 Mcg NDC#5007-1795-47    Reviewed

 

                    10/19/2014 12:00 AM    MAMMOGRAM SCREENING    Reviewed

 

                    10/19/2014 12:00 AM    Screening mammography, bilateral    Reviewed

 

                    10/16/2014 12:00 AM    COMPLETE CBC W/AUTO DIFF WBC    Reviewed

 

                    10/16/2014 12:00 AM    COMPREHEN METABOLIC PANEL    Reviewed

 

                    10/16/2014 12:00 AM    IMMUNOASSAY TUMOR     Reviewed

 

                    10/16/2014 12:00 AM    LIPID PANEL         Reviewed

 

                    10/16/2014 12:00 AM    ASSAY OF LITHIUM    Reviewed

 

                    10/16/2014 12:00 AM    MAMMOGRAM SCREENING    Reviewed

 

                    2011 12:00 AM    ASSAY OF PARATHORMONE    Reviewed

 

                    2011 12:00 AM    VITAMIN D 25 HYDROXY    Reviewed

 

                    2011 12:00 AM    ASSAY OF LITHIUM    Reviewed

 

                    2011 12:00 AM    METABOLIC PANEL TOTAL CA    Reviewed

 

                    2011 12:00 AM    CT HEAD/BRAIN W/O & W/DYE    Reviewed

 

                    3/23/2015 12:00 AM    PNEUMOCOCCAL VACC 13 GLENDY IM    Reviewed

 

                    3/23/2015 12:00 AM    Vitamin B12 injection    Reviewed

 

                    2011 12:00 AM    ASSAY OF LITHIUM    Reviewed

 

                    2011 12:00 AM    B12 Injection Ndc#[address]-28  [facility]    Reviewed

 

                    2015 12:00 AM    THER/PROPH/DIAG INJ SC/IM    Reviewed

 

                    2015 12:00 AM    B12 Injection, Up to 1000 Mcg NDC#7796-9613-45    Reviewed

 

                    2015 12:00 AM    COMPLETE CBC W/AUTO DIFF WBC    Reviewed

 

                    2015 12:00 AM    COMPREHEN METABOLIC PANEL    Reviewed

 

                    2015 12:00 AM    LIPID PANEL         Reviewed

 

                    2015 12:00 AM    ASSAY OF LITHIUM    Reviewed

 

                    2011 12:00 AM    VIT D 1 25-DIHYDROXY    Reviewed

 

                    2011 12:00 AM    VITAMIN B-12        Reviewed

 

                    2015 12:00 AM    B12 Injection, Up to 1000 Mcg NDC#3044-4687-10    Reviewed

 

                    2015 12:00 AM    THER/PROPH/DIAG INJ SC/IM    Reviewed

 

                    2015 12:00 AM    B12 Injection, Up to 1000 Mcg NDC#5984-8726-22    Reviewed

 

                    2011 12:00 AM    THER/PROPH/DIAG INJ SC/IM    Reviewed

 

                    2011 12:00 AM    B12 Injection ([facility]) Ndc#7292-4662-67    Reviewed

 

                    2015 12:00 AM    THER/PROPH/DIAG INJ SC/IM    Reviewed

 

                    2015 12:00 AM    B12 Injection, Up to 1000 Mcg NDC#0642-2129-10    Reviewed







Results Summary







                          Data and Description      Results

 

                          2004 12:00 AM        Colonoscopy-Women and Men over 50 Normal 

 

                          2008 12:00 AM         Pap Smear Declined 

 

                          10/7/2009 12:00 AM        Cholest Cry Stone Ql .0 %LDLc SerPl-mCnc 123.0 mg/dLHDLc

 SerPl-mCnc 34.0 mg/dLTrigl SerPl-mCnc 190.0 mg/dLGlucose SerPl-mCnc 78.0 mg/dL

 

                          2009 12:00 AM        Mammogram -Women over 40 Normal HIV1+2 Ab Ser Ql no risk 

 

                          2010 8:47 AM         Dexa Bone Scan Refused Aspirin reccommended Contraindication 



 

                          2010 8:48 AM         Depression Done 

 

                          2010 12:00 AM         Foot Exam-Diabetic Done 

 

                          2010 12:00 AM         Cholest Cry Stone Ql .0 %LDLc SerPl-mCnc 126.0 mg/dLGlucose

 SerPl-mCnc 102.0 mg/dL

 

                          2010 8:45 AM          TRIGLYCERIDES 122.0 mg/dLCHOLESTEROL 186.0 mg/dLHDL 36.0 mg/dLLDL

 (CALC) 126.0 mg/dLGLUCOSE 102.0 mg/dLSODIUM 143.0 mmol/LPOTASSIUM 3.70 
mmol/LCHLORIDE 111.0 mmol/LCO2 23.0 mmol/LBUN 10.0 mg/dLCREATININE 0.80 
mg/dLSGOT/AST 12.0 IU/LSGPT/ALT 11.0 IU/LALK PHOS 65.0 IU/LTOTAL PROTEIN 7.20 
g/dLALBUMIN 3.90 g/dLTOTAL BILI 0.50 mg/dLCALCIUM 10.20 mg/dLeGFR >60 
mL/min/1.73 m2WBC 5.7 RBC 3.26 HGB 10.60 g/dLHCT 31.70 %MCV 97.0 fLMCH 32.50 
pgMCHC 33.40 g/dLRDW CV 13.30 %MPV 9.70 fLPLT 287 %NEUT 62.90 %%LYMP 21.80 
%%MONO 9.90 %%EOS 5.0 %%BASO 0.40 %#NEUT 3.56 #LYMP 1.23 #MONO 0.56 #EOS 0.28 
#BASO 0.02 

 

                          2010 12:00 AM        Glucose SerPl-nc 96.0 mg/dLCholest Cry Stone Ql .0 %LDLc

 [facility] [place]-nc 146.0 mg/dL

 

                          2010 8:26 AM         TRIGLYCERIDES 106.0 mg/dLCHOLESTEROL 199.0 mg/dLHDL 32.0 mg/dLLDL

 (CALC) 146.0 mg/dLGLUCOSE 96.0 mg/dLSODIUM 143.0 mmol/LPOTASSIUM 4.0 
mmol/LCHLORIDE 113.0 mmol/LCO2 24.0 mmol/LBUN 13.0 mg/dLCREATININE 1.0 
mg/dLSGOT/AST 11.0 IU/LSGPT/ALT 6.0 IU/LALK PHOS 56.0 IU/LTOTAL PROTEIN 6.60 
g/dLALBUMIN 3.80 g/dLTOTAL BILI 0.50 mg/dLCALCIUM 9.30 mg/dLeGFR 57 

 

                          10/6/2010 12:00 AM        Cholest Cry Stone Ql .0 %LDLc SerPl-mCnc 111.0 mg/dLGlucose

 SerPl-mCnc 81.0 mg/dL

 

                          10/6/2010 2:45 PM         TRIGLYCERIDES 123.0 mg/dLCHOLESTEROL 178.0 mg/dLHDL 42.0 mg/dLLDL

 (CALC) 111.0 mg/dLGLUCOSE 81.0 mg/dLSODIUM 139.0 mmol/LPOTASSIUM 4.10 
mmol/LCHLORIDE 106.0 mmol/LCO2 24.0 mmol/LBUN 13.0 mg/dLCREATININE 0.90 
mg/dLSGOT/AST 13.0 IU/LSGPT/ALT 11.0 IU/LALK PHOS 61.0 IU/LTOTAL PROTEIN 7.10 
g/dLALBUMIN 3.90 g/dLTOTAL BILI 0.30 mg/dLCALCIUM 9.30 mg/dLeGFR >60 mL/min/1.73
 m2WBC 6.9 RBC 3.59 HGB 11.50 g/dLHCT 35.30 %MCV 98.0 fLMCH 32.0 pgMCHC 32.60 
g/dLRDW CV 12.90 %MPV 9.90 fLPLT 311 %NEUT 64.90 %%LYMP 22.50 %%MONO 7.20 %%EOS 
5.10 %%BASO 0.30 %#NEUT 4.45 #LYMP 1.54 #MONO 0.49 #EOS 0.35 #BASO 0.02 

 

                          2011 12:00 AM         Mammogram -Women over 40 Ordered 

 

                          2011 10:25 AM        TRIGLYCERIDES 111.0 mg/dLCHOLESTEROL 195.0 mg/dLHDL 43.0 mg/dLLDL

 (CALC) 130.0 mg/dLWBC 5.3 RBC 3.76 HGB 12.0 g/dLHCT 37.80 %.0 fLMCH 
31.90 pgMCHC 31.70 g/dLRDW CV 13.0 %MPV 9.70 fLPLT 259 %NEUT 69.0 %%LYMP 17.60 
%%MONO 8.30 %%EOS 4.70 %%BASO 0.40 %#NEUT 3.63 #LYMP 0.93 #MONO 0.44 #EOS 0.25 
#BASO 0.02 GLUCOSE 102.0 mg/dLSODIUM 146.0 mmol/LPOTASSIUM 4.20 mmol/LCHLORIDE 
113.0 mmol/LCO2 23.0 mmol/LBUN 15.0 mg/dLCREATININE 1.0 mg/dLSGOT/AST 12.0 
IU/LSGPT/ALT 17.0 IU/LALK PHOS 60.0 IU/LTOTAL PROTEIN 6.90 g/dLALBUMIN 4.20 
g/dLTOTAL BILI 0.40 mg/dLCALCIUM 9.70 mg/dLeGFR 57 

 

                          2011 11:49 AM        Cholest Cry Stone Ql .0 %LDLc SerPl-mCnc 130.0 mg/dLHDLc

 SerPl-mCnc 43.0 mg/dLTrigl SerPl-mCnc 111.0 mg/dLGlucose SerPl-mCnc 102.0 mg/dL

 

                          2011 11:52 AM        Pap Smear Declined 

 

                          2011 11:28 AM        Lithium 2.080 mmol/LGLUCOSE 102.0 mg/dLSODIUM 135.0 mmol/LPOTASSIUM

 3.90 mmol/LCHLORIDE 106.0 mmol/LCO2 21.0 mmol/LBUN 12.0 mg/dLCREATININE 1.30 
mg/dLCALCIUM 10.70 mg/dLeGFR 42 

 

                          2011 8:58 AM          Lithium 0.690 mmol/L

 

                          2011 2:38 PM         VITAMIN B12 3483.0 pg/mL

 

                          2013 3:35 PM          WBC 5.1 RBC 3.73 HGB 11.70 g/dLHCT 36.40 %MCV 98.0 fLMCH 31.40

 pgMCHC 32.10 g/dLRDW CV 13.10 %MPV 9.80 fLPLT 224 %NEUT 66.80 %%LYMP 19.10 
%%MONO 9.0 %%EOS 4.90 %%BASO 0.20 %#NEUT 3.42 #LYMP 0.98 #MONO 0.46 #EOS 0.25 
#BASO 0.01 GLUCOSE 88.0 mg/dLSODIUM 141.0 mmol/LPOTASSIUM 4.10 mmol/LCHLORIDE 
110.0 mmol/LCO2 22.0 mmol/LBUN 22.0 mg/dLCREATININE 1.10 mg/dLCALCIUM 9.80 
mg/dLeGFR 50 Lithium 0.760 mmol/L

 

                          2013 11:02 AM        TRIGLYCERIDES 106.0 mg/dLCHOLESTEROL 181.0 mg/dLHDL 46.0 mg/dLLDL

 (CALC) 114.0 mg/dLVITAMIN D 41.10 ng/mL

 

                          10/17/2014 10:10 AM       WBC 5.0 RBC 3.66 HGB 11.60 g/dLHCT 36.80 %.0 fLMCH 31.70

 pgMCHC 31.50 g/dLRDW CV 13.50 %MPV 10.10 fLPLT 209 %NEUT 69.20 %%LYMP 21.0 
%%MONO 6.40 %%EOS 3.20 %%BASO 0.20 %#NEUT 3.46 #LYMP 1.05 #MONO 0.32 #EOS 0.16 
#BASO 0.01 GLUCOSE 100.0 mg/dLSODIUM 148.0 mmol/LPOTASSIUM 3.90 mmol/LCHLORIDE 
114.0 mmol/LCO2 26.0 mmol/LBUN 12.0 mg/dLCREATININE 1.20 mg/dLSGOT/AST 9.0 
IU/LSGPT/ALT <6 IU/LALK PHOS 82.0 IU/LTOTAL PROTEIN 6.90 g/dLALBUMIN 4.0 
g/dLTOTAL BILI 0.40 mg/dLCALCIUM 10.50 mg/dLeGFR 45 TRIGLYCERIDES 96.0 
mg/dLCHOLESTEROL 155.0 mg/dLHDL 38.0 mg/dLLDL (CALC) 98.0 mg/dLLithium 0.850 
mmol/LCancer Antigen (CA) 125 8.30 U/mL

 

                          2015 10:25 AM        Lithium 0.790 mmol/LWBC 4.8 RBC 3.44 HGB 11.0 g/dLHCT 35.20 

%.0 fLMCH 32.0 pgMCHC 31.30 g/dLRDW CV 14.0 %MPV 9.30 fLPLT 210 %NEUT 
70.80 %%LYMP 17.20 %%MONO 8.10 %%EOS 3.50 %%BASO 0.40 %#NEUT 3.41 #LYMP 0.83 
#MONO 0.39 #EOS 0.17 #BASO 0.02 TRIGLYCERIDES 107.0 mg/dLCHOLESTEROL 174.0 
mg/dLHDL 43.0 mg/dLLDL (CALC) 110.0 mg/dLGLUCOSE 90.0 mg/dLSODIUM 145.0 
mmol/LPOTASSIUM 3.80 mmol/LCHLORIDE 115.0 mmol/LCO2 24.0 mmol/LBUN 17.0 mg
/dLCREATININE 1.30 mg/dLSGOT/AST 18.0 IU/LSGPT/ALT 17.0 IU/LALK PHOS 56.0 
IU/LTOTAL PROTEIN 6.70 g/dLALBUMIN 3.90 g/dLTOTAL BILI 0.40 mg/dLCALCIUM 9.80 
mg/dLeGFR 41 

 

                          2015 8:50 AM        WBC 5.8 RBC 3.29 HGB 10.70 g/dLHCT 34.0 %.0 fLMCH 32.50

 pgMCHC 31.50 g/dLRDW CV 13.60 %MPV 9.60 fLPLT 223 %NEUT 69.60 %%LYMP 18.90 
%%MONO 8.50 %%EOS 2.80 %%BASO 0.20 %#NEUT 4.03 #LYMP 1.09 #MONO 0.49 #EOS 0.16 
#BASO 0.01 Lithium 0.620 mmol/LGLUCOSE 83.0 mg/dLSODIUM 139.0 mmol/LPOTASSIUM 
3.90 mmol/LCHLORIDE 109.0 mmol/LCO2 22.0 mmol/LBUN 19.0 mg/dLCREATININE 1.40 
mg/dLSGOT/AST 19.0 IU/LSGPT/ALT 21.0 IU/LALK PHOS 55.0 IU/LTOTAL PROTEIN 6.50 
g/dLALBUMIN 3.90 g/dLTOTAL BILI 0.50 mg/dLCALCIUM 9.60 mg/dLeGFR 38 
TRIGLYCERIDES 121.0 mg/dLCHOLESTEROL 192.0 mg/dLHDL 51.0 mg/dLLDL 121.0 mg/dLTSH
 1.210 uIU/mLHemoglobin A1c 5.40 %

 

                          3/15/2016 8:08 AM         VITAMIN B12 696.0 pg/mL

 

                          3/23/2016 8:26 AM         WBC 7.0 RBC 3.61 HGB 11.80 g/dLHCT 37.70 %.0 fLMCH 32.70

 pgMCHC 31.30 g/dLRDW CV 12.50 %MPV 10.0 fLPLT 207 %NEUT 73.60 %%LYMP 16.40 
%%MONO 6.60 %%EOS 3.0 %%BASO 0.30 %#NEUT 5.15 #LYMP 1.15 #MONO 0.46 #EOS 0.21 
#BASO 0.02 Lithium 0.940 mmol/LGLUCOSE 108.0 mg/dLSODIUM 143.0 mmol/LPOTASSIUM 
4.30 mmol/LCHLORIDE 110.0 mmol/LCO2 27.0 mmol/LBUN 16.0 mg/dLCREATININE 1.60 
mg/dLSGOT/AST 13.0 IU/LSGPT/ALT 7.0 IU/LALK PHOS 71.0 IU/LTOTAL PROTEIN 6.80 
g/dLALBUMIN 4.0 g/dLTOTAL BILI 0.20 mg/dLCALCIUM 10.40 mg/dLeGFR 32 
TRIGLYCERIDES 113.0 mg/dLCHOLESTEROL 169.0 mg/dLHDL 42.0 mg/dLLDL (CALC) 104.0 
mg/dLTSH 2.20 uIU/mLHemoglobin A1c 5.20 %

 

                          3/25/2016 9:17 AM         COLOR YELLOW APPEARANCE CLEAR SPEC GRAV 1.010 pH 7.0 PROTEIN 

NEGATIVE GLUCOSE NEGATIVE mg/dLKETONE NEGATIVE BILIRUBIN NEGATIVE BLOOD NEGATIVE
 NITRITE NEGATIVE LEUK SCREEN SMALL CASTS/LPF NEGATIVE /LPFCRYSTALS NEGATIVE 
MUCOUS THRDS NEGATIVE BACTERIA NEGATIVE EPITH CELLS FEW SQUAMOUS /HPFTRICHOMONAS
 NEGATIVE YEAST NEGATIVE 







History Of Immunizations







       Name    Date Admin    Mfg Name    Mfg Code    Trade Name    Lot#    Route    Inj    Vis Given    Vis

 Pub                                    CVX

 

        Influenza    2008    Not Entered    NE      Not Entered            Not Entered    Not Entered

                    1            999

 

        X       12/19/2008    [employer]    MSD     Pneumovax 23            Intramuscular    Not Entered

                    1            999

 

           Influenza    10/15/2010    [employer]    NOV        Fluvirin > 12 Years    

[id number]     Intramuscular    Left Deltoid    10/15/2010    2009    999

 

          X         3/23/2015    [facility]    [id number]    Intramuscular

                Right Gluteous Medius    3/23/2015       2013       109







History of Past Illness







                    Name                Date of Onset       Comments

 

                    Peritoneal Neoplasm, Malignant                         

 

                    Hyperlipidemia                           

 

                    Bipolar disorder, unspecified                         

 

                    Artificial opening status; colostomy                         

 

                    B12 deficiency                           

 

                    Anemia, Pernicious                         

 

                    Arthritis unspecified                         

 

                    cervical cancer                          

 

                    Artificial opening status; colostomy    2010  1:10PM     

 

                    Bipolar disorder, unspecified    2010  1:10PM     

 

                    Hyperlipidemia      2010  1:10PM     

 

                    Anemia, Pernicious    2010  1:10PM     

 

                    Postoperative Follow-Up    2010  1:55PM     

 

                    Postoperative Follow-Up    Mar  8 2010 10:57AM     

 

                    Artificial opening status; colostomy    Mar  8 2010  1:19PM     

 

                    Peritoneal Neoplasm, Malignant    Mar  8 2010  1:19PM     

 

                    Artificial opening status; colostomy    2010  1:40PM     

 

                    Hyperlipidemia      2010  1:40PM     

 

                    Anemia, Pernicious    2010  1:40PM     

 

                    Peritoneal Neoplasm, Malignant    2010  1:40PM     

 

                    Arthritis unspecified    2010  1:40PM     

 

                    Anemia of Chronic Illness    2010  1:40PM     

 

                    Tinea corporis      2010  3:17PM     

 

                    Bipolar disorder, unspecified    2010  1:33PM     

 

                    Hyperlipidemia      2010  1:33PM     

 

                    Anemia, Pernicious    2010  1:33PM     

 

                    Peritoneal Neoplasm, Malignant    2010  1:33PM     

 

                    B12 deficiency      2010  1:33PM     

 

                    Ethmoidal Sinusitis, Acute    Sep 21 2010  3:53PM     

 

                    Wheezing            Sep 21 2010  3:53PM     

 

                    Flu                 Oct 15 2010  1:40PM     

 

                    Bipolar disorder, unspecified    Oct 15 2010  1:42PM     

 

                    Hyperlipidemia      Oct 15 2010  1:42PM     

 

                    Anemia, Pernicious    Oct 15 2010  1:42PM     

 

                    Peritoneal Neoplasm, Malignant    Oct 15 2010  1:42PM     

 

                    Bipolar disorder, unspecified    2011 12:01PM     

 

                    Hyperlipidemia      2011 12:01PM     

 

                    Anemia, Pernicious    2011 12:01PM     

 

                    Peritoneal Neoplasm, Malignant    2011 12:01PM     

 

                    Bipolar disorder, unspecified    Apr 15 2011 10:55AM     

 

                    Major Depression    2011 10:11AM     

 

                    Bipolar Disorder    2011 10:11AM     

 

                    Cancer              May 10 2011  4:16PM     

 

                    Major Depression    May 10 2011  3:16PM     

 

                    Bipolar Disorder    May 10 2011  3:16PM     

 

                    Hypercalcemia       May 23 2011  2:47PM     

 

                    Bipolar disorder, unspecified    May 23 2011  2:47PM     

 

                    Colon Cancer, Personal History    May 23 2011  2:47PM     

 

                    Bipolar Disorder    May 31 2011  4:39PM     

 

                    Depressive Disorder    2011 10:01AM     

 

                    Vitamin B12 deficiency    2011 10:01AM     

 

                    Vitamin D Deficiency    2011  5:07PM     

 

                    Anemia, Vitamin B12 Deficiency    2011  5:07PM     

 

                    B12 deficiency      2011  3:56PM     

 

                    Routine gynecological examination    Aug  4 2011  9:08AM     

 

                    Screening Examination for Breast Cancer    Aug  4 2011  9:08AM     

 

                    Tinea Corporis      Aug  4 2011  9:08AM     

 

                    Depressive Disorder    Sep 23 2011  8:47AM     

 

                    Contact Dermatitis    Sep 23 2011  8:47AM     

 

                    Anemia, Pernicious    Sep 23 2011  8:47AM     

 

                    B12 deficiency      Sep 23 2011  8:47AM     

 

                    B12 deficiency      Sep 27 2011  2:58PM     

 

                    B12 deficiency      Oct 20 2011  2:34PM     

 

                    Flu                 Dec  9 2011  3:16PM     

 

                    B12 deficiency      Dec  9 2011  3:17PM     

 

                    B12 deficiency      2012  4:52PM     

 

                    B12 deficiency      Feb 2012 11:10AM     

 

                    B12 deficiency      2012  3:37PM     

 

                    B12 deficiency      May  3 2012  4:10PM     

 

                    B12 deficiency      2012  2:54PM     

 

                    B12 deficiency      2012 11:23AM     

 

                    B12 deficiency      Aug  9 2012  2:08PM     

 

                    B12 deficiency      Sep  6 2012  4:36PM     

 

                    B12 deficiency      Oct 16 2012 10:23AM     

 

                    Flu                 Feb  4   3:11PM     

 

                    Bipolar disorder, unspecified    Feb  2013  2:48PM     

 

                    Anemia, Pernicious    Feb  4   2:48PM     

 

                    B12 deficiency      Feb  4   2:48PM     

 

                    Extrapyramidal abnormal movement disorder    Feb  4   2:48PM     

 

                    B12 deficiency      Apr  3 2013 12:03PM     

 

                    Bipolar disorder, unspecified    May  7 2013  1:31PM     

 

                    Anemia, Pernicious    May  7 2013  1:31PM     

 

                    B12 deficiency      May  7 2013  1:31PM     

 

                    Extrapyramidal abnormal movement disorder    May  7 2013  1:31PM     

 

                    B12 deficiency      2013  3:42PM     

 

                    B12 deficiency      2013  1:31PM     

 

                    Hyperlipidemia      Aug  7 2013 10:37AM     

 

                    Vitamin D Deficiency    Aug  7 2013 10:37AM     

 

                    Bipolar disorder, unspecified    Aug  7 2013 10:37AM     

 

                    Anemia, Pernicious    Aug  7 2013 10:37AM     

 

                    B12 deficiency      Aug  7 2013 10:37AM     

 

                    B12 deficiency      Sep 25 2013 11:15AM     

 

                    B12 deficiency      Dec 11 2013  3:16PM     

 

                    B12 deficiency      Mar  6 2014  1:48PM     

 

                    B12 deficiency      May 21 2014  3:17PM     

 

                    Screening Examination for Breast Cancer    2014  3:23PM     

 

                    Periumbilical abdominal pain    2014  3:23PM     

 

                    B12 deficiency      Jul 10 2014  2:52PM     

 

                    Anemia, Vitamin B12 Deficiency    Aug 13 2014  4:50PM     

 

                    Bipolar disorder    Oct 16 2014 11:13AM     

 

                    Hyperlipidemia      Oct 16 2014 11:13AM     

 

                    Anemia, Pernicious    Oct 16 2014 11:13AM     

 

                    Peritoneal Neoplasm, Malignant    Oct 16 2014 11:13AM     

 

                    Screening breast examination    Oct 16 2014 11:13AM     

 

                    Weight loss         Oct 16 2014 11:13AM     

 

                    Anemia, Pernicious    Mar 23 2015  2:57PM     

 

                    B12 deficiency      Mar 23 2015  2:57PM     

 

                    Need for Prevnar vaccine    Mar 23 2015  2:57PM     

 

                    Bipolar disorder    Mar 23 2015  2:57PM     

 

                    Hyperlipidemia      Mar 23 2015  2:57PM     

 

                    Anemia, Pernicious    Mar 23 2015  2:57PM     

 

                    Peritoneal Neoplasm, Malignant    Mar 23 2015  2:57PM     

 

                    B12 deficiency      May  4 2015  4:48PM     

 

                    Hyperlipidemia      May 13 2015  9:56AM     

 

                    Anemia              May 13 2015  9:56AM     

 

                    Bipolar disorder    May 13 2015  9:56AM     

 

                    Bipolar disorder    May 14 2015  3:27PM     

 

                    Hyperlipidemia      May 14 2015  3:27PM     

 

                    Anemia, Pernicious    May 14 2015  3:27PM     

 

                    Peritoneal Neoplasm, Malignant    May 14 2015  3:27PM     

 

                    B12 deficiency      2015  2:20PM     

 

                    B12 deficiency      2015 11:34AM     

 

                    B12 deficiency      Aug 18 2015  9:06AM     

 

                    Tinea Corporis      Sep 18 2015  8:54AM     

 

                    B12 deficiency      Sep 18 2015  8:54AM     

 

                    B12 deficiency      2015 10:28AM     

 

                    Herpes zoster without complication    Dec  3 2015  9:52AM     

 

                    B12 deficiency      Dec 23 2015 11:21AM     

 

                    B12 deficiency      2016  4:51PM     

 

                    Vitamin B 12 deficiency    Mar 14 2016  5:35PM     

 

                    B12 deficiency      Mar 15 2016 12:14PM     

 

                    B12 deficiency      May  5 2016 11:30AM     

 

                    Edema               May  5 2016 11:30AM     

 

                    Dermatitis          May  5 2016 11:30AM     

 

                    Edema               May 17 2016  8:38AM     

 

                    Shortness of breath    May 17 2016  8:38AM     







Payers







           Insurance Name    Company Name    Plan Name    Plan Number    Policy Number    Policy Group

 Number                                 Start Date

 

                    Medicare Part A    Medicare [facility]              [id number]              N/A

 

                          [employer]    [employer]                 [id number]

                                                    

 

                    Medicare Part A    Medicare - Lab/Xray              [id number]              2006

 

                    Medicare Part B    [employer]ic[employer] [place]              [id number]              2006

 

                          [employer]    [employer]                 50 percent

                                                    2009

 

                    [facility]              [id number]              N/A

 

                    Medicare Part A    Medicare Part A              [id number]              N/A

 

                    Medicare Part A    Medicare Part A              [id number]              2006









History of Encounters







                    Visit Date          Visit Type          Provider

 

                    2016            Office visit        Bhupinder Aspen DO

 

                    3/15/2016           Nurse visit         Bhupinder Aspen DO

 

                    2016            Nurse visit         Bhupinder Aspen DO

 

                    2015          Nurse visit         Bhupinder Aspen DO

 

                    12/3/2015           Office visit        Bhupinder Aspen DO

 

                    2015          Nurse visit         Bhupinder Aspen DO

 

                    2015           Office visit        Bhupinder Aspen DO

 

                    2015           Nurse visit         Bhupinder Aspen DO

 

                    2015            Nurse visit         Bhupinder Aspen DO

 

                    2015            Nurse visit         Bhupinder Aspen DO

 

                    2015           Office visit        Bhupinder Aspen DO

 

                    2015            Nurse visit         Bhupinder Aspen DO

 

                    3/23/2015           Office visit        Bhupinder Aspen DO

 

                    10/16/2014          Office visit        Bhupinder Aspen DO

 

                    2014           Nurse visit         Rahda GREEN

 

                    7/10/2014           Nurse visit         Bhupinder Aspen DO

 

                    2014           Office visit        Bhupinder Aspen DO

 

                    2014           Nurse visit         Bhupinder Aspen DO

 

                    3/6/2014            Nurse visit         Bhupinder Aspen DO

 

                    2014            [facility]            EARNEST Lopez MD

 

                    2013          Nurse visit         Bhupinder Aspen DO

 

                    2013           Nurse visit         Bhupinder Aspen DO

 

                    2013            Office visit        Bhupinder Aspen DO

 

                    2013            Nurse visit         Bhupinder Aspen DO

 

                    2013            Nurse visit         Bhupinder Aspen DO

 

                    2013            Office visit        Bhupinder Aspen DO

 

                    4/3/2013            Nurse visit         Bhupinder Aspen DO

 

                    2013            Office visit        Bhupinder Aspen DO

 

                    10/16/2012          Nurse visit         Bhupinder Aspen DO

 

                    2012            Nurse visit         Bhupinder Aspen DO

 

                    2012            Voided              Bhupinder Aspen DO

 

                    2012            Nurse visit         Bhupinder Aspen DO

 

                    2012            Nurse visit         Bhupinder Aspen DO

 

                    2012           Nurse visit         Bhupinder Aspen DO

 

                    5/3/2012            Nurse visit         Bhupinder Aspen DO

 

                    2012           Nurse visit         Bhupinder Aspen DO

 

                    2012           Nurse visit         Bhupinder Aspen DO

 

                    2012           Nurse visit         Bhupinder Aspen DO

 

                    2011           Nurse visit         Bhupinder Aspen DO

 

                    10/20/2011          Nurse visit         Bhupinder Aspen DO

 

                    2011           Office visit        Bhupinder Aspen DO

 

                    2011           Nurse visit         Radha GREEN

 

                    2011            Office visit        Bhupinder Aspen DO

 

                    2011           Nurse visit         Bhupinder Aspen DO

 

                    2011            Office visit        Bhupinder [place] DO

 

                    2011           Office visit        Bhupinder Aspen DO

 

                    5/10/2011           Office visit        Bhupinder Aspen DO

 

                    2011           Office visit        Bhupinder Aspen DO

 

                    4/15/2011           Office visit        Devin Angel DO

 

                    2011           Office visit        Devin Angel DO

 

                    10/15/2010          Office visit        Devin Angel DO

 

                    2010           Office visit        Devin Angel DO

 

                    2010            Office visit        Devin Angel DO

 

                    2010           Office visit        Devin Angel DO

 

                    2010            Office visit        Devin Angel DO

 

                    3/8/2010            Office visit        Devin Masterson MD

 

                    2010            Surgery             Devin Masterson MD

 

                    2010            Office visit        Devin Angel DO

 

                    2010           Surgery             Devin Masterson MD

 

                    2010           Hospital            Devin Masterson MD

 

                    2010           [facility]            Devin Masterson MD

 

                    10/22/2009          Office visit        Devin Angel DO

## 2019-06-26 NOTE — XMS REPORT
MU2 Ambulatory Summary

                             Created on: 2016



Pauline Gan

External Reference #: [id number]

: 1950

Sex: Female



Demographics







                          Address                   [address]

[place]  [id number]

 

                          Home Phone                [phone]

 

                          Preferred Language        English

 

                          Marital Status            Legally 

 

                          Confucianist Affiliation     Unknown

 

                          Race                      White

 

                          Ethnic Group              Not  or 





Author







                          Author                    Bhupinder Louise

 

                          [facility]

 

                          Address                   [address]

[place]  [phone]



 

                          Phone                     [phone]







Care Team Providers







                    Care Team Member Name    Role                Phone

 

                    Bhupinder Louise    PCP                 Unavailable







Allergies and Adverse Reactions







                    Name                Reaction            Notes

 

                    NO KNOWN DRUG ALLERGIES                         







Plan of Treatment







             Planned Activity    Comments     Planned Date    Planned Time    Plan/Goal

 

             COMPLETE CBC W/AUTO DIFF WBC                 2013     12:00 AM      

 

             ASSAY OF LITHIUM                 2013     12:00 AM      

 

             METABOLIC PANEL TOTAL CA                 2013     12:00 AM      

 

             VITAMIN B-12                 2013     12:00 AM      

 

             ASSAY OF FOLIC ACID SERUM                 2013     12:00 AM      

 

             THER/PROPH/DIAG INJ SC/IM                 2013    12:00 AM      







Medications







                                        Active 

 

             Name         Start Date    Estimated Completion Date    SIG          Comments

 

                Latuda 20 mg oral tablet                                    take 1 tablet (20 mg) by oral route once daily with

 food (at least 350 calories)            

 

             pravastatin 40 mg oral tablet    3/30/2015                 TAKE 1 TABLET BY MOUTH DAILY     

 

                Namenda XR 28 mg oral capsule,sprinkle,ER 24hr    2015                       take 1 capsule (28

 mg) by oral route once daily            

 

                Namenda XR 28 mg oral capsule,sprinkle,ER 24hr    2016                       take 1 capsule (28

 mg) by oral route once daily            

 

                triamcinolone acetonide 0.1 % topical cream    2016                        apply a thin layer to 

the affected area(s) by topical route 2 times per day     

 

                furosemide 40 mg oral tablet    2016      take 1 tablet (40 mg) by oral

 route once daily                        

 

                potassium chloride 10 mEq oral tablet extended release    2016                       take 1 tablet

 (10 meq) by oral route once daily       

 

             pravastatin 40 mg oral tablet    2016                 TAKE 1 TABLET BY MOUTH DAILY     









                                         

 

             Name         Start Date    Expiration Date    SIG          Comments

 

             Reglan 10mg    3/29/2010    2010    one ac and hs     

 

                Keflex 500 mg oral capsule    2010       10/1/2010       take 1 capsule (500 mg) by oral

 route every 6 hours for 10 days         

 

                Bactrim -160 mg oral tablet    2011       take 1 tablet by oral route

 every 12 hours for 7 days               

 

                triamcinolone acetonide 0.1 % topical cream    2011      apply a thin

 layer to the affected area(s) by topical route 2 times per day     

 

                sertraline 100 mg oral tablet    4/10/2012       5/10/2012       take 1.5 tablets by oral route

 daily for 30 days                       

 

                ergocalciferol (vitamin D2) 50,000 unit oral capsule    4/15/2013       2013       TAKE

 ONE CAPSULE BY MOUTH ONCE A WEEK        

 

                CYANOCOBALAM 1000MCGINJ 1000 milliliter    2013       INJECT 1ML INTRAMUSCULAR

 ONCE A MONTH                            

 

                pravastatin 40 mg oral tablet    3/25/2014       3/20/2015       TAKE ONE TABLET BY MOUTH EVERY

 DAY                                     

 

                          Zostavax (PF) 19,400 unit/0.65 mL subcutaneous suspension for reconstitution    3/23/2015

                    3/24/2015           inject 0.65 milliliter by subcutaneous route once     

 

                lithium carbonate 300 mg oral capsule    2015       take 1 capsule (300

 mg) by oral route 2 for 30 days         

 

                famciclovir 500 mg oral tablet    12/3/2015       12/10/2015      take 1 tablet (500 mg) by

 oral route every 8 hours for 7 days     









                                        Discontinued 

 

             Name         Start Date    Discontinued Date    SIG          Comments

 

                Tylenol 325 mg oral tablet                    2013        take 1 - 2 tablets (325 -650 mg) by oral

 route every 4-6 hours as needed         

 

                Calcium 600 + D(3) 600 mg(1,500mg) -400 unit oral tablet                    2011       take 1 tablet

 by oral route 2 times a day            no longer taking

 

                Vitamin B-12 1,000 mcg oral tablet extended release    2010       take 1

 tablet by oral route daily             no longer taking

 

                Antifungal (clotrimazole) 1 % topical cream    2010       apply to the 

affected and surrounding areas of skin by topical route 2 times per day morning 
and evening                              

 

                sertraline 100 mg oral tablet    5/10/2011       2011       take 2 tablets (200 mg) by 

oral route once daily                   discontinued by Dr. Serrano

 

                mirtazapine 15 mg oral tablet                    2011        take 1 tablet (15 mg) by oral route 

once daily before bedtime               Dr. Serrano

 

                mirtazapine 15 mg oral tablet                    2011        take 1 tablet (15 mg) by oral route 

once daily before bedtime               dc'd by Dr. Serrano

 

                Pristiq 50 mg oral tablet extended release 24 hr                    2013        take 1 tablet (50

 mg) by oral route once daily           Dr. Serrano

 

                Pristiq 50 mg oral tablet extended release 24 hr                    2013        take 1 tablet (50

 mg) by oral route once daily           dose updated

 

                Vitamin B-12 1,000 mcg/mL injection solution    2011        inject 1 milliliter

 (1,000 mcg) by intramuscular route once a month    on list already

 

                    syringe with needle 1 mL 25 gauge x 1" miscellaneous syringe    2011

                          use for injection once a month     

 

                clotrimazole 1 % topical cream    2011        apply to the affected and surrounding

 areas of skin by topical route 2 times per day in the morning and evening     

 

                Vitamin D2 50,000 unit oral capsule    2011        take 1 capsule (50,000

 unit) by oral route once weekly        generic on list

 

                Pravachol 40 mg oral tablet    2012        take 1 tablet (40 mg) by oral 

route once daily for 90 days            generic on list

 

                lithium carbonate 300 mg oral capsule    2012        take 1 capsule by oral

 route daily                            dose updated

 

                Pristiq 100 mg oral tablet extended release 24 hr                    4/10/2012       take 1 and 1/2 

tablet (150 mg) by oral route once daily    Mental Health provider

 

                Pristiq 100 mg oral tablet extended release 24 hr                    4/10/2012       take 1 and 1/2 

tablet (150 mg) by oral route once daily    Discontinued by Dr Efrain [facility] at [facility]

 

                hydroxyzine HCl 50 mg oral tablet    10/16/2014      2015       take 1 tablet (50 mg) 

by oral route at bedtime                 

 

                fluconazole 100 mg oral tablet    2015       12/3/2015       take 1 tablet (100 mg) by 

oral route once a week                   

 

                ketoconazole 2 % topical cream    2015       12/3/2015       apply to the affected area(s)

 by topical route 2 times per day        

 

                prednisone 10 mg oral tablet    12/3/2015       2016        take 2 tablets (20 mg) by oral

 route once daily for 4 days 1 tablet daily for 4 days 0.5 tablet daily for 4 
days                                     







Problem List







                    Description         Status              Onset

 

                    Artificial opening status; colostomy    Active               

 

                    Bipolar disorder, unspecified    Active               

 

                    Hyperlipidemia      Active               

 

                    Peritoneal Neoplasm, Malignant    Active               

 

                    Anemia, Pernicious    Active               

 

                    Arthritis unspecified    Active               

 

                    B12 deficiency      Active               







Vital Signs







      Date    Time    BP-Sys(mm[Hg]    BP-Lynn(mm[Hg])    HR(bpm)    RR(rpm)    Temp    WT    HT    HC    BMI

                    BSA                 BMI Percentile      O2 Sat(%)

 

        2016    11:27:00 AM    148 mmHg    78 mmHg    90 bpm    20 rpm    98.2 F    153 lbs    69 in

                          22.59 kg/m2    1.84 m2                   96 %

 

        12/3/2015    9:50:00 AM    132 mmHg    70 mmHg    62 bpm    16 rpm    97.9 F    145 lbs    69 in

                          21.4125 kg/m    1.7894 m                 100 %

 

        2015    8:52:00 AM    132 mmHg    68 mmHg    52 bpm    20 rpm    97.8 F    141 lbs    69 in

                          20.82 kg/m2    1.76 m2                   100 %

 

        2015    3:25:00 PM    120 mmHg    62 mmHg    72 bpm    16 rpm    98.1 F    136 lbs    69 in

                          20.0835 kg/m    1.733 m                 98 %

 

       3/23/2015    2:55:00 PM    130 mmHg    76 mmHg    68 bpm    18 rpm    97 F    140 lbs    69 in    

                20.67 kg/m2     1.76 m2                         98 %

 

        10/16/2014    11:11:00 AM    120 mmHg    66 mmHg    77 bpm    20 rpm    98 F    130 lbs    69 in

                          19.1974 kg/m    1.6943 m                 100 %

 

        2014    3:21:00 PM    130 mmHg    66 mmHg    63 bpm    18 rpm    97.2 F    160 lbs    69 in

                          23.63 kg/m2    1.88 m2                   99 %

 

        2013    10:35:00 AM    132 mmHg    70 mmHg    66 bpm    20 rpm    98.1 F    157 lbs    69 in

                          23.1846 kg/m    1.862 m                  

 

        2013    1:29:00 PM    132 mmHg    70 mmHg    76 bpm    18 rpm    98.2 F    166 lbs    69 in 

                          24.51 kg/m2    1.91 m2                    

 

       2013    2:46:00 PM    128 mmHg    70 mmHg    76 bpm    16 rpm    98 F    160 lbs    69 in     

                23.6276 kg/m    1.8797 m                       

 

        2011    8:49:00 AM    128 mmHg    78 mmHg    70 bpm    18 rpm    97.9 F    164 lbs    69 in

                          24.22 kg/m2    1.90 m2                    

 

     2011    1:31:00 PM    132 mmHg    68 mmHg    84 bpm         97 F    167 lbs                        

                                         

 

        2011    9:09:00 AM    128 mmHg    70 mmHg    72 bpm    18 rpm    98.2 F    163 lbs    64 in 

                          27.98 kg/m2    1.83 m2                    

 

       2011    10:01:00 AM    132 mmHg    70 mmHg    72 bpm    18 rpm    98.2 F    154 lbs             

                                                                 

 

       2011    2:47:00 PM    128 mmHg    70 mmHg    72 bpm    18 rpm    97.8 F    156 lbs             

                                                                 

 

       5/10/2011    3:16:00 PM    144 mmHg    80 mmHg    72 bpm    18 rpm    98.2 F    158 lbs             

                                                                 

 

        2011    10:11:00 AM    132 mmHg    70 mmHg    70 bpm    18 rpm    98.2 F    168 lbs    69 in

                          24.81 kg/m2    1.93 m2                    

 

        4/15/2011    10:52:00 AM    110 mmHg    60 mmHg    75 bpm    16 rpm    97.5 F    172.375 lbs    

69 in                     25.4551 kg/m    1.951 m                 100 %

 

        2011    11:43:00 AM    120 mmHg    82 mmHg    75 bpm    16 rpm    97.2 F    178.5 lbs    69

 in                       26.36 kg/m2    1.99 m2                   100 %

 

        10/15/2010    1:32:00 PM    120 mmHg    70 mmHg    80 bpm    18 rpm    96.6 F    177 lbs    69 in

                          26.1381 kg/m    1.977 m                 100 %

 

        2010    3:50:00 PM    168 mmHg    100 mmHg    82 bpm    18 rpm    97.8 F    177.5 lbs    69

 in                       26.21 kg/m2    1.98 m2                   97 %

 

        2010    1:21:00 PM    140 mmHg    80 mmHg    59 bpm    16 rpm    97.6 F    173.25 lbs    69 

in                        25.5843 kg/m    1.956 m                 100 %

 

        2010    3:02:00 PM    140 mmHg    80 mmHg    61 bpm    16 rpm    97.6 F    173.125 lbs    69

 in                       25.57 kg/m2    1.96 m2                   99 %

 

        2010    1:23:00 PM    130 mmHg    80 mmHg    66 bpm    16 rpm    96.8 F    173 lbs    69 in 

                          25.5474 kg/m    1.9546 m                 100 %

 

        2010    12:58:00 PM    130 mmHg    88 mmHg    75 bpm    16 rpm    98.4 F    172.25 lbs    69

 in                       25.44 kg/m2    1.95 m2                   100 %







Social History







                    Name                Description         Comments

 

                    denies alcohol use                         

 

                    denies smoking                           

 

                    Denies illicit substance abuse                         

 

                    retired                                 direct care

 

                    Single                                   

 

                    Exercises regularly                         

 

                    Attended some college                         

 

                    Tobacco             Never smoker         







History of Procedures







                    Date Ordered        Description         Order Status

 

                    2010 12:00 AM    COMPREHEN METABOLIC PANEL    Reviewed

 

                    2010 12:00 AM    COMPLETE CBC W/AUTO DIFF WBC    Reviewed

 

                    2010 12:00 AM    LIPID PANEL         Reviewed

 

                          2015 12:00 AM        B12 Injection, Up to 1000 Mcg NDC#7408-5150-21 [facility] 

                                        Reviewed

 

                    2011 12:00 AM    MAMMOGRAM SCREENING    Reviewed

 

                    2011 12:00 AM    CYTOPATH C/V THIN LAYER    Reviewed

 

                    2011 12:00 AM    B12 Injection 1 cc NDC#[address]-17    Reviewed

 

                    2015 12:00 AM    THER/PROPH/DIAG INJ SC/IM    Reviewed

 

                    2015 12:00 AM    B12 Injection, Up to 1000 Mcg NDC#4473-3984-01    Reviewed

 

                    2011 12:00 AM    THER/PROPH/DIAG INJ SC/IM    Reviewed

 

                    2011 12:00 AM    B12 Injection([place]) Ndc#6278-5143-53-    Reviewed

 

                    2015 12:00 AM    THER/PROPH/DIAG INJ SC/IM    Reviewed

 

                    2015 12:00 AM    B12 Injection, Up to 1000 Mcg NDC#3650-5748-93    Reviewed

 

                    10/20/2011 12:00 AM    THER/PROPH/DIAG INJ SC/IM    Reviewed

 

                    10/20/2011 12:00 AM    B12 Injection([place]) Ndc#0489-2626-10-    Reviewed

 

                    2016 12:00 AM    THER/PROPH/DIAG INJ SC/IM    Reviewed

 

                    2016 12:00 AM    B12 Injection, Up to 1000 Mcg NDC#9189-9218-53    Reviewed

 

                    3/14/2016 12:00 AM    VITAMIN B-12        Reviewed

 

                    3/15/2016 12:00 AM    THER/PROPH/DIAG INJ SC/IM    Reviewed

 

                    3/15/2016 12:00 AM    B12 Injection, Up to 1000 Mcg NDC#3459-6123-50    Reviewed

 

                    2011 12:00 AM    ***Immunization administration, Medicare flu    Reviewed

 

                    2011 12:00 AM    Fluzone ** MEDICARE Only **    Reviewed

 

                    2011 12:00 AM    THER/PROPH/DIAG INJ SC/IM    Reviewed

 

                    2011 12:00 AM    B12 Injection ([facility]) Ndc#8192-6792-95    Reviewed

 

                    2016 12:00 AM    B12 Injection, Up to 1000 Mcg NDC#1510-4837-89 [facility]    

Reviewed

 

                    2016 12:00 AM    TTE W/DOPPLER COMPLETE    Reviewed

 

                    2012 12:00 AM    THER/PROPH/DIAG INJ SC/IM    Reviewed

 

                    2012 12:00 AM    B12 Injection ([facility]) Ndc#9979-5037-60    Reviewed

 

                    2012 12:00 AM    THER/PROPH/DIAG INJ SC/IM    Reviewed

 

                    2012 12:00 AM    B12 Injection([place]) Ndc#0304-1675-52-    Reviewed

 

                    5/3/2012 12:00 AM    THER/PROPH/DIAG INJ SC/IM    Reviewed

 

                    5/3/2012 12:00 AM    B12 Injection([place]) Ndc#4530-0051-37-    Reviewed

 

                    2012 12:00 AM    IMMUNOTHERAPY INJECTIONS    Reviewed

 

                    2012 12:00 AM    B12 Injection([place]) Ndc#5220-7553-35-    Reviewed

 

                    2012 12:00 AM    THER/PROPH/DIAG INJ SC/IM    Reviewed

 

                    2012 12:00 AM    B12 Injection, Up to 1000 Mcg NDC#2901-1187-09    Reviewed

 

                    2012 12:00 AM    THER/PROPH/DIAG INJ SC/IM    Reviewed

 

                    2012 12:00 AM    B12 Injection, Up to 1000 Mcg NDC#3550-6011-99    Reviewed

 

                    2012 12:00 AM    THER/PROPH/DIAG INJ SC/IM    Reviewed

 

                    2012 12:00 AM    B12 Injection, Up to 1000 Mcg NDC#9599-7512-69    Reviewed

 

                    10/16/2012 12:00 AM    THER/PROPH/DIAG INJ SC/IM    Reviewed

 

                    10/16/2012 12:00 AM    B12 Injection, Up to 1000 Mcg NDC#7339-4121-36    Reviewed

 

                    2010 12:00 AM    COMPREHEN METABOLIC PANEL    Reviewed

 

                    2010 12:00 AM    COMPLETE CBC W/AUTO DIFF WBC    Reviewed

 

                    2010 12:00 AM    LIPID PANEL         Reviewed

 

                    2013 12:00 AM    Flu Injection 3 Years And Above NDC# [address]-19  [facility]    Reviewed



 

                    2013 12:00 AM    COMPLETE CBC W/AUTO DIFF WBC    Reviewed

 

                    2013 12:00 AM    ASSAY OF LITHIUM    Reviewed

 

                    2013 12:00 AM    METABOLIC PANEL TOTAL CA    Reviewed

 

                    4/3/2013 12:00 AM    THER/PROPH/DIAG INJ SC/IM    Reviewed

 

                    4/3/2013 12:00 AM    B12 Injection, Up to 1000 Mcg NDC#2595-8602-17    Reviewed

 

                    2013 12:00 AM    THER/PROPH/DIAG INJ SC/IM    Reviewed

 

                    2013 12:00 AM    B12 Injection, Up to 1000 Mcg NDC#0637-5435-80    Reviewed

 

                    2013 12:00 AM    THER/PROPH/DIAG INJ SC/IM    Reviewed

 

                    2013 12:00 AM    B12 Injection, Up to 1000 Mcg NDC#9955-6887-32    Reviewed

 

                    2013 12:00 AM    LIPID PANEL         Reviewed

 

                    2013 12:00 AM    VITAMIN D 25 HYDROXY    Reviewed

 

                    2013 12:00 AM    THER/PROPH/DIAG INJ SC/IM    Reviewed

 

                    3/6/2014 12:00 AM    THER/PROPH/DIAG INJ SC/IM    Reviewed

 

                    2014 12:00 AM    THER/PROPH/DIAG INJ SC/IM    Reviewed

 

                    2014 12:00 AM    B12 Injection, Up to 1000 Mcg NDC#6738-2143-81    Reviewed

 

                    2010 12:00 AM    SKIN FUNGI CULTURE    Reviewed

 

                    10/9/2010 12:00 AM    COMPREHEN METABOLIC PANEL    Reviewed

 

                    10/9/2010 12:00 AM    LIPID PANEL         Reviewed

 

                    2010 12:00 AM    THER/PROPH/DIAG INJ SC/IM    Reviewed

 

                    2010 12:00 AM    B12 Injection Ndc#[address]-02 ([facility])    Reviewed

 

                    2010 12:00 AM    THER/PROPH/DIAG INJ SC/IM    Reviewed

 

                    2010 12:00 AM    Kenalog 40 Mg Im-Ndc#[address]-04 ([facility])    Reviewed

 

                    10/15/2010 12:00 AM    FLU VACCINE 3 YRS & > IM    Reviewed

 

                    10/15/2010 12:00 AM    Admin.Of M/C Cov.Vaccine-Flu Vacc.    Reviewed

 

                    1/15/2011 12:00 AM    COMPLETE CBC W/AUTO DIFF WBC    Reviewed

 

                    1/15/2011 12:00 AM    COMPREHEN METABOLIC PANEL    Reviewed

 

                    1/15/2011 12:00 AM    LIPID PANEL         Reviewed

 

                    2014 12:00 AM    MAMMOGRAM SCREENING    Reviewed

 

                    2014 12:00 AM    Screening mammography, bilateral    Reviewed

 

                    7/10/2014 12:00 AM    THER/PROPH/DIAG INJ SC/IM    Reviewed

 

                    7/10/2014 12:00 AM    B12 Injection, Up to 1000 Mcg NDC#8822-1019-25    Reviewed

 

                    2011 12:00 AM    COMPLETE CBC W/AUTO DIFF WBC    Reviewed

 

                    2011 12:00 AM    COMPREHEN METABOLIC PANEL    Reviewed

 

                    2011 12:00 AM    LIPID PANEL         Reviewed

 

                    2014 12:00 AM    B12 Injection, Up to 1000 Mcg NDC#6627-2428-37    Reviewed

 

                    10/19/2014 12:00 AM    MAMMOGRAM SCREENING    Reviewed

 

                    10/19/2014 12:00 AM    Screening mammography, bilateral    Reviewed

 

                    10/16/2014 12:00 AM    COMPLETE CBC W/AUTO DIFF WBC    Reviewed

 

                    10/16/2014 12:00 AM    COMPREHEN METABOLIC PANEL    Reviewed

 

                    10/16/2014 12:00 AM    IMMUNOASSAY TUMOR     Reviewed

 

                    10/16/2014 12:00 AM    LIPID PANEL         Reviewed

 

                    10/16/2014 12:00 AM    ASSAY OF LITHIUM    Reviewed

 

                    10/16/2014 12:00 AM    MAMMOGRAM SCREENING    Reviewed

 

                    2011 12:00 AM    ASSAY OF PARATHORMONE    Reviewed

 

                    2011 12:00 AM    VITAMIN D 25 HYDROXY    Reviewed

 

                    2011 12:00 AM    ASSAY OF LITHIUM    Reviewed

 

                    2011 12:00 AM    METABOLIC PANEL TOTAL CA    Reviewed

 

                    2011 12:00 AM    CT HEAD/BRAIN W/O & W/DYE    Reviewed

 

                    3/23/2015 12:00 AM    PNEUMOCOCCAL VACC 13 GLENDY IM    Reviewed

 

                    3/23/2015 12:00 AM    Vitamin B12 injection    Reviewed

 

                    2011 12:00 AM    ASSAY OF LITHIUM    Reviewed

 

                    2011 12:00 AM    B12 Injection Ndc#[address]-66  [facility]    Reviewed

 

                    2015 12:00 AM    THER/PROPH/DIAG INJ SC/IM    Reviewed

 

                    2015 12:00 AM    B12 Injection, Up to 1000 Mcg NDC#9799-5701-37    Reviewed

 

                    2015 12:00 AM    COMPLETE CBC W/AUTO DIFF WBC    Reviewed

 

                    2015 12:00 AM    COMPREHEN METABOLIC PANEL    Reviewed

 

                    2015 12:00 AM    LIPID PANEL         Reviewed

 

                    2015 12:00 AM    ASSAY OF LITHIUM    Reviewed

 

                    2011 12:00 AM    VIT D 1 25-DIHYDROXY    Reviewed

 

                    2011 12:00 AM    VITAMIN B-12        Reviewed

 

                    2015 12:00 AM    B12 Injection, Up to 1000 Mcg NDC#5619-5052-49    Reviewed

 

                    2015 12:00 AM    THER/PROPH/DIAG INJ SC/IM    Reviewed

 

                    2015 12:00 AM    B12 Injection, Up to 1000 Mcg NDC#4455-7221-10    Reviewed

 

                    2011 12:00 AM    THER/PROPH/DIAG INJ SC/IM    Reviewed

 

                    2011 12:00 AM    B12 Injection ([facility]) Ndc#7383-9530-79    Reviewed

 

                    2015 12:00 AM    THER/PROPH/DIAG INJ SC/IM    Reviewed

 

                    2015 12:00 AM    B12 Injection, Up to 1000 Mcg NDC#3034-8607-86    Reviewed







Results Summary







                          Data and Description      Results

 

                          2004 12:00 AM        Colonoscopy-Women and Men over 50 Normal 

 

                          2008 12:00 AM         Pap Smear Declined 

 

                          10/7/2009 12:00 AM        Cholest Cry Stone Ql .0 %LDLc SerPl-mCnc 123.0 mg/dLHDLc

 SerPl-mCnc 34.0 mg/dLTrigl SerPl-mCnc 190.0 mg/dLGlucose SerPl-mCnc 78.0 mg/dL

 

                          2009 12:00 AM        Mammogram -Women over 40 Normal HIV1+2 Ab Ser Ql no risk 

 

                          2010 8:47 AM         Dexa Bone Scan Refused Aspirin reccommended Contraindication 



 

                          2010 8:48 AM         Depression Done 

 

                          2010 12:00 AM         Foot Exam-Diabetic Done 

 

                          2010 12:00 AM         Cholest Cry Stone Ql .0 %LDLc SerPl-mCnc 126.0 mg/dLGlucose

 SerPl-mCnc 102.0 mg/dL

 

                          2010 8:45 AM          TRIGLYCERIDES 122.0 mg/dLCHOLESTEROL 186.0 mg/dLHDL 36.0 mg/dLLDL

 (CALC) 126.0 mg/dLGLUCOSE 102.0 mg/dLSODIUM 143.0 mmol/LPOTASSIUM 3.70 
mmol/LCHLORIDE 111.0 mmol/LCO2 23.0 mmol/LBUN 10.0 mg/dLCREATININE 0.80 
mg/dLSGOT/AST 12.0 IU/LSGPT/ALT 11.0 IU/LALK PHOS 65.0 IU/LTOTAL PROTEIN 7.20 
g/dLALBUMIN 3.90 g/dLTOTAL BILI 0.50 mg/dLCALCIUM 10.20 mg/dLeGFR >60 
mL/min/1.73 m2WBC 5.7 RBC 3.26 HGB 10.60 g/dLHCT 31.70 %MCV 97.0 fLMCH 32.50 
pgMCHC 33.40 g/dLRDW CV 13.30 %MPV 9.70 fLPLT 287 %NEUT 62.90 %%LYMP 21.80 
%%MONO 9.90 %%EOS 5.0 %%BASO 0.40 %#NEUT 3.56 #LYMP 1.23 #MONO 0.56 #EOS 0.28 
#BASO 0.02 

 

                          2010 12:00 AM        Glucose SerPl-mCnc 96.0 mg/dLCholest Melva Stone Ql .0 %LDLc

 SerPl-mCnc 146.0 mg/dL

 

                          2010 8:26 AM         TRIGLYCERIDES 106.0 mg/dLCHOLESTEROL 199.0 mg/dLHDL 32.0 mg/dLLDL

 (CALC) 146.0 mg/dLGLUCOSE 96.0 mg/dLSODIUM 143.0 mmol/LPOTASSIUM 4.0 
mmol/LCHLORIDE 113.0 mmol/LCO2 24.0 mmol/LBUN 13.0 mg/dLCREATININE 1.0 
mg/dLSGOT/AST 11.0 IU/LSGPT/ALT 6.0 IU/LALK PHOS 56.0 IU/LTOTAL PROTEIN 6.60 
g/dLALBUMIN 3.80 g/dLTOTAL BILI 0.50 mg/dLCALCIUM 9.30 mg/dLeGFR 57 

 

                          10/6/2010 12:00 AM        Cholest Cry Stone Ql .0 %LDLc SerPl-mCnc 111.0 mg/dLGlucose

 SerPl-mCnc 81.0 mg/dL

 

                          10/6/2010 2:45 PM         TRIGLYCERIDES 123.0 mg/dLCHOLESTEROL 178.0 mg/dLHDL 42.0 mg/dLLDL

 (CALC) 111.0 mg/dLGLUCOSE 81.0 mg/dLSODIUM 139.0 mmol/LPOTASSIUM 4.10 
mmol/LCHLORIDE 106.0 mmol/LCO2 24.0 mmol/LBUN 13.0 mg/dLCREATININE 0.90 
mg/dLSGOT/AST 13.0 IU/LSGPT/ALT 11.0 IU/LALK PHOS 61.0 IU/LTOTAL PROTEIN 7.10 
g/dLALBUMIN 3.90 g/dLTOTAL BILI 0.30 mg/dLCALCIUM 9.30 mg/dLeGFR >60 mL/min/1.73
 m2WBC 6.9 RBC 3.59 HGB 11.50 g/dLHCT 35.30 %MCV 98.0 fLMCH 32.0 pgMCHC 32.60 
g/dLRDW CV 12.90 %MPV 9.90 fLPLT 311 %NEUT 64.90 %%LYMP 22.50 %%MONO 7.20 %%EOS 
5.10 %%BASO 0.30 %#NEUT 4.45 #LYMP 1.54 #MONO 0.49 #EOS 0.35 #BASO 0.02 

 

                          2011 12:00 AM         Mammogram -Women over 40 Ordered 

 

                          2011 10:25 AM        TRIGLYCERIDES 111.0 mg/dLCHOLESTEROL 195.0 mg/dLHDL 43.0 mg/dLLDL

 (CALC) 130.0 mg/dLWBC 5.3 RBC 3.76 HGB 12.0 g/dLHCT 37.80 %.0 fLMCH 
31.90 pgMCHC 31.70 g/dLRDW CV 13.0 %MPV 9.70 fLPLT 259 %NEUT 69.0 %%LYMP 17.60 
%%MONO 8.30 %%EOS 4.70 %%BASO 0.40 %#NEUT 3.63 #LYMP 0.93 #MONO 0.44 #EOS 0.25 
#BASO 0.02 GLUCOSE 102.0 mg/dLSODIUM 146.0 mmol/LPOTASSIUM 4.20 mmol/LCHLORIDE 
113.0 mmol/LCO2 23.0 mmol/LBUN 15.0 mg/dLCREATININE 1.0 mg/dLSGOT/AST 12.0 
IU/LSGPT/ALT 17.0 IU/LALK PHOS 60.0 IU/LTOTAL PROTEIN 6.90 g/dLALBUMIN 4.20 
g/dLTOTAL BILI 0.40 mg/dLCALCIUM 9.70 mg/dLeGFR 57 

 

                          2011 11:49 AM        Cholest Cry Stone Ql .0 %LDLc SerPl-mCnc 130.0 mg/dLHDLc

 SerPl-mCnc 43.0 mg/dLTrigl SerPl-mCnc 111.0 mg/dLGlucose SerPl-mCnc 102.0 mg/dL

 

                          2011 11:52 AM        Pap Smear Declined 

 

                          2011 11:28 AM        Lithium 2.080 mmol/LGLUCOSE 102.0 mg/dLSODIUM 135.0 mmol/LPOTASSIUM

 3.90 mmol/LCHLORIDE 106.0 mmol/LCO2 21.0 mmol/LBUN 12.0 mg/dLCREATININE 1.30 
mg/dLCALCIUM 10.70 mg/dLeGFR 42 

 

                          2011 8:58 AM          Lithium 0.690 mmol/L

 

                          2011 2:38 PM         VITAMIN B12 3483.0 pg/mL

 

                          2013 3:35 PM          WBC 5.1 RBC 3.73 HGB 11.70 g/dLHCT 36.40 %MCV 98.0 fLMCH 31.40

 pgMCHC 32.10 g/dLRDW CV 13.10 %MPV 9.80 fLPLT 224 %NEUT 66.80 %%LYMP 19.10 
%%MONO 9.0 %%EOS 4.90 %%BASO 0.20 %#NEUT 3.42 #LYMP 0.98 #MONO 0.46 #EOS 0.25 
#BASO 0.01 GLUCOSE 88.0 mg/dLSODIUM 141.0 mmol/LPOTASSIUM 4.10 mmol/LCHLORIDE 
110.0 mmol/LCO2 22.0 mmol/LBUN 22.0 mg/dLCREATININE 1.10 mg/dLCALCIUM 9.80 
mg/dLeGFR 50 Lithium 0.760 mmol/L

 

                          2013 11:02 AM        TRIGLYCERIDES 106.0 mg/dLCHOLESTEROL 181.0 mg/dLHDL 46.0 mg/dLLDL

 (CALC) 114.0 mg/dLVITAMIN D 41.10 ng/mL

 

                          10/17/2014 10:10 AM       WBC 5.0 RBC 3.66 HGB 11.60 g/dLHCT 36.80 %.0 fLMCH 31.70

 pgMCHC 31.50 g/dLRDW CV 13.50 %MPV 10.10 fLPLT 209 %NEUT 69.20 %%LYMP 21.0 
%%MONO 6.40 %%EOS 3.20 %%BASO 0.20 %#NEUT 3.46 #LYMP 1.05 #MONO 0.32 #EOS 0.16 
#BASO 0.01 GLUCOSE 100.0 mg/dLSODIUM 148.0 mmol/LPOTASSIUM 3.90 mmol/LCHLORIDE 
114.0 mmol/LCO2 26.0 mmol/LBUN 12.0 mg/dLCREATININE 1.20 mg/dLSGOT/AST 9.0 
IU/LSGPT/ALT <6 IU/LALK PHOS 82.0 IU/LTOTAL PROTEIN 6.90 g/dLALBUMIN 4.0 
g/dLTOTAL BILI 0.40 mg/dLCALCIUM 10.50 mg/dLeGFR 45 TRIGLYCERIDES 96.0 
mg/dLCHOLESTEROL 155.0 mg/dLHDL 38.0 mg/dLLDL (CALC) 98.0 mg/dLLithium 0.850 
mmol/LCancer Antigen (CA) 125 8.30 U/mL

 

                          2015 10:25 AM        Lithium 0.790 mmol/LWBC 4.8 RBC 3.44 HGB 11.0 g/dLHCT 35.20 

%.0 fLMCH 32.0 pgMCHC 31.30 g/dLRDW CV 14.0 %MPV 9.30 fLPLT 210 %NEUT 
70.80 %%LYMP 17.20 %%MONO 8.10 %%EOS 3.50 %%BASO 0.40 %#NEUT 3.41 #LYMP 0.83 
#MONO 0.39 #EOS 0.17 #BASO 0.02 TRIGLYCERIDES 107.0 mg/dLCHOLESTEROL 174.0 
mg/dLHDL 43.0 mg/dLLDL (CALC) 110.0 mg/dLGLUCOSE 90.0 mg/dLSODIUM 145.0 
mmol/LPOTASSIUM 3.80 mmol/LCHLORIDE 115.0 mmol/LCO2 24.0 mmol/LBUN 17.0 mg
/dLCREATININE 1.30 mg/dLSGOT/AST 18.0 IU/LSGPT/ALT 17.0 IU/LALK PHOS 56.0 
IU/LTOTAL PROTEIN 6.70 g/dLALBUMIN 3.90 g/dLTOTAL BILI 0.40 mg/dLCALCIUM 9.80 
mg/dLeGFR 41 

 

                          2015 8:50 AM        WBC 5.8 RBC 3.29 HGB 10.70 g/dLHCT 34.0 %.0 fLMCH 32.50

 pgMCHC 31.50 g/dLRDW CV 13.60 %MPV 9.60 fLPLT 223 %NEUT 69.60 %%LYMP 18.90 
%%MONO 8.50 %%EOS 2.80 %%BASO 0.20 %#NEUT 4.03 #LYMP 1.09 #MONO 0.49 #EOS 0.16 
#BASO 0.01 Lithium 0.620 mmol/LGLUCOSE 83.0 mg/dLSODIUM 139.0 mmol/LPOTASSIUM 
3.90 mmol/LCHLORIDE 109.0 mmol/LCO2 22.0 mmol/LBUN 19.0 mg/dLCREATININE 1.40 
mg/dLSGOT/AST 19.0 IU/LSGPT/ALT 21.0 IU/LALK PHOS 55.0 IU/LTOTAL PROTEIN 6.50 
g/dLALBUMIN 3.90 g/dLTOTAL BILI 0.50 mg/dLCALCIUM 9.60 mg/dLeGFR 38 
TRIGLYCERIDES 121.0 mg/dLCHOLESTEROL 192.0 mg/dLHDL 51.0 mg/dLLDL 121.0 mg/dLTSH
 1.210 uIU/mLHemoglobin A1c 5.40 %

 

                          3/15/2016 8:08 AM         VITAMIN B12 696.0 pg/mL

 

                          3/23/2016 8:26 AM         WBC 7.0 RBC 3.61 HGB 11.80 g/dLHCT 37.70 %.0 fLMCH 32.70

 pgMCHC 31.30 g/dLRDW CV 12.50 %MPV 10.0 fLPLT 207 %NEUT 73.60 %%LYMP 16.40 
%%MONO 6.60 %%EOS 3.0 %%BASO 0.30 %#NEUT 5.15 #LYMP 1.15 #MONO 0.46 #EOS 0.21 
#BASO 0.02 Lithium 0.940 mmol/LGLUCOSE 108.0 mg/dLSODIUM 143.0 mmol/LPOTASSIUM 
4.30 mmol/LCHLORIDE 110.0 mmol/LCO2 27.0 mmol/LBUN 16.0 mg/dLCREATININE 1.60 
mg/dLSGOT/AST 13.0 IU/LSGPT/ALT 7.0 IU/LALK PHOS 71.0 IU/LTOTAL PROTEIN 6.80 
g/dLALBUMIN 4.0 g/dLTOTAL BILI 0.20 mg/dLCALCIUM 10.40 mg/dLeGFR 32 
TRIGLYCERIDES 113.0 mg/dLCHOLESTEROL 169.0 mg/dLHDL 42.0 mg/dLLDL (CALC) 104.0 
mg/dLTSH 2.20 uIU/mLHemoglobin A1c 5.20 %

 

                          3/25/2016 9:17 AM         COLOR YELLOW APPEARANCE CLEAR SPEC GRAV 1.010 pH 7.0 PROTEIN 

NEGATIVE GLUCOSE NEGATIVE mg/dLKETONE NEGATIVE BILIRUBIN NEGATIVE BLOOD NEGATIVE
 NITRITE NEGATIVE LEUK SCREEN SMALL CASTS/LPF NEGATIVE /LPFCRYSTALS NEGATIVE 
MUCOUS THRDS NEGATIVE BACTERIA NEGATIVE EPITH CELLS FEW SQUAMOUS /HPFTRICHOMONAS
 NEGATIVE YEAST NEGATIVE 







History Of Immunizations







       Name    Date Admin    Mfg Name    Mfg Code    Trade Name    Lot#    Route    Inj    Vis Given    Vis

 Pub                                    CVX

 

        Influenza    2008    Not Entered    NE      Not Entered            Not Entered    Not Entered

                    1            999

 

        X       12/19/2008    [employer]    MSD     Pneumovax 23            Intramuscular    Not Entered

                    1            999

 

           Influenza    10/15/2010    [employer]    NOV        Fluvirin > 12 Years    

[id number]     Intramuscular    Left Deltoid    10/15/2010    2009    999

 

          X         3/23/2015    [facility]    [id number]    Intramuscular

                Right Gluteous Medius    3/23/2015       2013       109







History of Past Illness







                    Name                Date of Onset       Comments

 

                    Peritoneal Neoplasm, Malignant                         

 

                    Hyperlipidemia                           

 

                    Bipolar disorder, unspecified                         

 

                    Artificial opening status; colostomy                         

 

                    B12 deficiency                           

 

                    Anemia, Pernicious                         

 

                    Arthritis unspecified                         

 

                    cervical cancer                          

 

                    Artificial opening status; colostomy    2010  1:10PM     

 

                    Bipolar disorder, unspecified    2010  1:10PM     

 

                    Hyperlipidemia      2010  1:10PM     

 

                    Anemia, Pernicious    2010  1:10PM     

 

                    Postoperative Follow-Up    2010  1:55PM     

 

                    Postoperative Follow-Up    Mar  8 2010 10:57AM     

 

                    Artificial opening status; colostomy    Mar  8 2010  1:19PM     

 

                    Peritoneal Neoplasm, Malignant    Mar  8 2010  1:19PM     

 

                    Artificial opening status; colostomy    2010  1:40PM     

 

                    Hyperlipidemia      2010  1:40PM     

 

                    Anemia, Pernicious    2010  1:40PM     

 

                    Peritoneal Neoplasm, Malignant    2010  1:40PM     

 

                    Arthritis unspecified    2010  1:40PM     

 

                    Anemia of Chronic Illness    2010  1:40PM     

 

                    Tinea corporis      2010  3:17PM     

 

                    Bipolar disorder, unspecified    2010  1:33PM     

 

                    Hyperlipidemia      2010  1:33PM     

 

                    Anemia, Pernicious    2010  1:33PM     

 

                    Peritoneal Neoplasm, Malignant    2010  1:33PM     

 

                    B12 deficiency      2010  1:33PM     

 

                    Ethmoidal Sinusitis, Acute    Sep 21 2010  3:53PM     

 

                    Wheezing            Sep 21 2010  3:53PM     

 

                    Flu                 Oct 15 2010  1:40PM     

 

                    Bipolar disorder, unspecified    Oct 15 2010  1:42PM     

 

                    Hyperlipidemia      Oct 15 2010  1:42PM     

 

                    Anemia, Pernicious    Oct 15 2010  1:42PM     

 

                    Peritoneal Neoplasm, Malignant    Oct 15 2010  1:42PM     

 

                    Bipolar disorder, unspecified    2011 12:01PM     

 

                    Hyperlipidemia      2011 12:01PM     

 

                    Anemia, Pernicious    2011 12:01PM     

 

                    Peritoneal Neoplasm, Malignant    2011 12:01PM     

 

                    Bipolar disorder, unspecified    Apr 15 2011 10:55AM     

 

                    Major Depression    2011 10:11AM     

 

                    Bipolar Disorder    2011 10:11AM     

 

                    Cancer              May 10 2011  4:16PM     

 

                    Major Depression    May 10 2011  3:16PM     

 

                    Bipolar Disorder    May 10 2011  3:16PM     

 

                    Hypercalcemia       May 23 2011  2:47PM     

 

                    Bipolar disorder, unspecified    May 23 2011  2:47PM     

 

                    Colon Cancer, Personal History    May 23 2011  2:47PM     

 

                    Bipolar Disorder    May 31 2011  4:39PM     

 

                    Depressive Disorder    2011 10:01AM     

 

                    Vitamin B12 deficiency    2011 10:01AM     

 

                    Vitamin D Deficiency    2011  5:07PM     

 

                    Anemia, Vitamin B12 Deficiency    2011  5:07PM     

 

                    B12 deficiency      2011  3:56PM     

 

                    Routine gynecological examination    Aug  4 2011  9:08AM     

 

                    Screening Examination for Breast Cancer    Aug  4 2011  9:08AM     

 

                    Tinea Corporis      Aug  4 2011  9:08AM     

 

                    Depressive Disorder    Sep 23 2011  8:47AM     

 

                    Contact Dermatitis    Sep 23 2011  8:47AM     

 

                    Anemia, Pernicious    Sep 23 2011  8:47AM     

 

                    B12 deficiency      Sep 23 2011  8:47AM     

 

                    B12 deficiency      Sep 27 2011  2:58PM     

 

                    B12 deficiency      Oct 20 2011  2:34PM     

 

                    Flu                 Dec  9 2011  3:16PM     

 

                    B12 deficiency      Dec  9 2011  3:17PM     

 

                    B12 deficiency      2012  4:52PM     

 

                    B12 deficiency      Feb 2012 11:10AM     

 

                    B12 deficiency      2012  3:37PM     

 

                    B12 deficiency      May  3 2012  4:10PM     

 

                    B12 deficiency      2012  2:54PM     

 

                    B12 deficiency      2012 11:23AM     

 

                    B12 deficiency      Aug  9 2012  2:08PM     

 

                    B12 deficiency      Sep  6 2012  4:36PM     

 

                    B12 deficiency      Oct 16 2012 10:23AM     

 

                    Flu                 Feb  4   3:11PM     

 

                    Bipolar disorder, unspecified    Feb  2013  2:48PM     

 

                    Anemia, Pernicious    Feb  4   2:48PM     

 

                    B12 deficiency      Feb  4   2:48PM     

 

                    Extrapyramidal abnormal movement disorder    Feb  4   2:48PM     

 

                    B12 deficiency      Apr  3 2013 12:03PM     

 

                    Bipolar disorder, unspecified    May  7 2013  1:31PM     

 

                    Anemia, Pernicious    May  7 2013  1:31PM     

 

                    B12 deficiency      May  7 2013  1:31PM     

 

                    Extrapyramidal abnormal movement disorder    May  7 2013  1:31PM     

 

                    B12 deficiency      2013  3:42PM     

 

                    B12 deficiency      2013  1:31PM     

 

                    Hyperlipidemia      Aug  7 2013 10:37AM     

 

                    Vitamin D Deficiency    Aug  7 2013 10:37AM     

 

                    Bipolar disorder, unspecified    Aug  7 2013 10:37AM     

 

                    Anemia, Pernicious    Aug  7 2013 10:37AM     

 

                    B12 deficiency      Aug  7 2013 10:37AM     

 

                    B12 deficiency      Sep 25 2013 11:15AM     

 

                    B12 deficiency      Dec 11 2013  3:16PM     

 

                    B12 deficiency      Mar  6 2014  1:48PM     

 

                    B12 deficiency      May 21 2014  3:17PM     

 

                    Screening Examination for Breast Cancer    2014  3:23PM     

 

                    Periumbilical abdominal pain    2014  3:23PM     

 

                    B12 deficiency      Jul 10 2014  2:52PM     

 

                    Anemia, Vitamin B12 Deficiency    Aug 13 2014  4:50PM     

 

                    Bipolar disorder    Oct 16 2014 11:13AM     

 

                    Hyperlipidemia      Oct 16 2014 11:13AM     

 

                    Anemia, Pernicious    Oct 16 2014 11:13AM     

 

                    Peritoneal Neoplasm, Malignant    Oct 16 2014 11:13AM     

 

                    Screening breast examination    Oct 16 2014 11:13AM     

 

                    Weight loss         Oct 16 2014 11:13AM     

 

                    Anemia, Pernicious    Mar 23 2015  2:57PM     

 

                    B12 deficiency      Mar 23 2015  2:57PM     

 

                    Need for Prevnar vaccine    Mar 23 2015  2:57PM     

 

                    Bipolar disorder    Mar 23 2015  2:57PM     

 

                    Hyperlipidemia      Mar 23 2015  2:57PM     

 

                    Anemia, Pernicious    Mar 23 2015  2:57PM     

 

                    Peritoneal Neoplasm, Malignant    Mar 23 2015  2:57PM     

 

                    B12 deficiency      May  4 2015  4:48PM     

 

                    Hyperlipidemia      May 13 2015  9:56AM     

 

                    Anemia              May 13 2015  9:56AM     

 

                    Bipolar disorder    May 13 2015  9:56AM     

 

                    Bipolar disorder    May 14 2015  3:27PM     

 

                    Hyperlipidemia      May 14 2015  3:27PM     

 

                    Anemia, Pernicious    May 14 2015  3:27PM     

 

                    Peritoneal Neoplasm, Malignant    May 14 2015  3:27PM     

 

                    B12 deficiency      2015  2:20PM     

 

                    B12 deficiency      2015 11:34AM     

 

                    B12 deficiency      Aug 18 2015  9:06AM     

 

                    Tinea Corporis      Sep 18 2015  8:54AM     

 

                    B12 deficiency      Sep 18 2015  8:54AM     

 

                    B12 deficiency      2015 10:28AM     

 

                    Herpes zoster without complication    Dec  3 2015  9:52AM     

 

                    B12 deficiency      Dec 23 2015 11:21AM     

 

                    B12 deficiency      2016  4:51PM     

 

                    Vitamin B 12 deficiency    Mar 14 2016  5:35PM     

 

                    B12 deficiency      Mar 15 2016 12:14PM     

 

                    B12 deficiency      May  5 2016 11:30AM     

 

                    Edema               May  5 2016 11:30AM     

 

                    Dermatitis          May  5 2016 11:30AM     

 

                    Edema               May 17 2016  8:38AM     

 

                    Shortness of breath    May 17 2016  8:38AM     







Payers







           Insurance Name    Company Name    Plan Name    Plan Number    Policy Number    Policy Group

 Number                                 Start Date

 

                    Medicare Part A    Medicare [facility]              [id number]              N/A

 

                          [employer]    [employer]                 [id number]

                                                    

 

                    Medicare Part A    Medicare - Lab/Xray              [id number]              2006

 

                    Medicare Part B    [facility] [facility] [place]              [id number]              2006

 

                          [facility]    [facility] Financial Edwin                 50 percent

                                                    2009

 

                    [facility]              [id number]              N/A

 

                    Medicare Part A    Medicare Part A              [id number]              N/A

 

                    Medicare Part A    Medicare Part A              [id number]              2006









History of Encounters







                    Visit Date          Visit Type          Provider

 

                    2016            Office visit        Bhupinder Aspen DO

 

                    3/15/2016           Nurse visit         Bhupinder Aspen DO

 

                    2016            Nurse visit         Bhupinder Aspen DO

 

                    2015          Nurse visit         Bhupinder Aspen DO

 

                    12/3/2015           Office visit        Bhupinder Aspen DO

 

                    2015          Nurse visit         Bhupinder Aspen DO

 

                    2015           Office visit        Bhupinder Aspen DO

 

                    2015           Nurse visit         Bhupinder Aspen DO

 

                    2015            Nurse visit         Bhupinder Aspen DO

 

                    2015            Nurse visit         Bhupinder Aspen DO

 

                    2015           Office visit        Bhupinder Aspen DO

 

                    2015            Nurse visit         Bhupinder Aspen DO

 

                    3/23/2015           Office visit        Bhupinder Aspen DO

 

                    10/16/2014          Office visit        Bhupinder Aspen DO

 

                    2014           Nurse visit         Radha Ontiveros APRN

 

                    7/10/2014           Nurse visit         Bhupinder Aspen DO

 

                    2014           Office visit        Bhupinder Aspen DO

 

                    2014           Nurse visit         Bhupinder Aspen DO

 

                    3/6/2014            Nurse visit         Bhupinder Aspen DO

 

                    2014            [facility]            EARNEST Lopez MD

 

                    2013          Nurse visit         Bhupinder Aspen DO

 

                    2013           Nurse visit         Bhupinder Aspen DO

 

                    2013            Office visit        Bhupinder Aspen DO

 

                    2013            Nurse visit         Bhupinder Aspen DO

 

                    2013            Nurse visit         Bhupinder Aspen DO

 

                    2013            Office visit        Bhupinder Aspen DO

 

                    4/3/2013            Nurse visit         Bhupinder Aspen DO

 

                    2013            Office visit        Bhupinder Aspen DO

 

                    10/16/2012          Nurse visit         Bhupinder Aspen DO

 

                    2012            Nurse visit         Bhupinder Aspen DO

 

                    2012            Voided              Bhupinder Aspen DO

 

                    2012            Nurse visit         Bhupinder Aspen DO

 

                    2012            Nurse visit         Bhupinder Aspen DO

 

                    2012           Nurse visit         Bhupinder Aspen DO

 

                    5/3/2012            Nurse visit         Bhupinder Aspen DO

 

                    2012           Nurse visit         Bhupinder Aspen DO

 

                    2012           Nurse visit         Bhupinder Aspen DO

 

                    2012           Nurse visit         Bhupinder Aspen DO

 

                    2011           Nurse visit         Bhupinder Aspen DO

 

                    10/20/2011          Nurse visit         Bhupinder Aspen DO

 

                    2011           Office visit        Bhupinder Aspen DO

 

                    2011           Nurse visit         Radha GREEN

 

                    2011            Office visit        Bhupinder Aspen DO

 

                    2011           Nurse visit         Bhupinder Aspen DO

 

                    2011            Office visit        Bhupinder Aspen DO

 

                    2011           Office visit        Bhupinder Aspen DO

 

                    5/10/2011           Office visit        Bhupinder Aspen DO

 

                    2011           Office visit        Bhupinder Aspen DO

 

                    4/15/2011           Office visit        Devin Angel DO

 

                    2011           Office visit        Devin Angel DO

 

                    10/15/2010          Office visit        Devin Angel DO

 

                    2010           Office visit        Devin Angel DO

 

                    2010            Office visit        Devin Angel DO

 

                    2010           Office visit        Devin Angel DO

 

                    2010            Office visit        Devin Angel DO

 

                    3/8/2010            Office visit        Devin Masterson MD

 

                    2010            Surgery             Devin Masterson MD

 

                    2010            Office visit        Devin Angel DO

 

                    2010           Surgery             Devin Masterson MD

 

                    2010           [facility]            Devin Masterson MD

 

                    2010           [facility]            Devin Masterson MD

 

                    10/22/2009          Office visit        Devin Angel DO

## 2019-06-26 NOTE — XMS REPORT
MU2 Ambulatory Summary

                             Created on: 2017



Pauline Gan

External Reference #: [id number]

: 1950

Sex: Female



Demographics







                          Address                   [address]

[place]  [id number]

 

                          Home Phone                [phone]

 

                          Preferred Language        English

 

                          Marital Status            Legally 

 

                          Latter-day Affiliation     Unknown

 

                          Race                      White

 

                          Ethnic Group              Not  or 





Author







                          Pranav Washington

 

                          Organization              [facility]

 

                          Address                   [address]

[place]  [id number]



 

                          Phone                     [phone]







Care Team Providers







                    Care Team Member Name    Role                Phone

 

                    Pranav Angel     PCP                 Unavailable

 

                    Bhupinder Louise    PreferredProvider    Unavailable







Allergies and Adverse Reactions







                    Name                Reaction            Notes

 

                    NO KNOWN DRUG ALLERGIES                         







Plan of Treatment







             Planned Activity    Comments     Planned Date    Planned Time    Plan/Goal

 

             Injection,Subcutaneous/Intramuscul, [facility]                 2017    12:00 AM      







Medications







                                        Active 

 

             Name         Start Date    Estimated Completion Date    SIG          Comments

 

                Latuda 20 mg oral tablet                                    take 1 tablet (20 mg) by oral route once daily with

 food (at least 350 calories)            

 

             pravastatin 40 mg oral tablet    3/30/2015                 TAKE 1 TABLET BY MOUTH DAILY     

 

                Namenda XR 28 mg oral capsule,sprinkle,ER 24hr    2015                       take 1 capsule (28

 mg) by oral route once daily            

 

                Namenda XR 28 mg oral capsule,sprinkle,ER 24hr    2016                       take 1 capsule (28

 mg) by oral route once daily            

 

                potassium chloride 10 mEq oral tablet extended release    2016                       take 1 tablet

 (10 meq) by oral route once daily       

 

             pravastatin 40 mg oral tablet    2016                 TAKE 1 TABLET BY MOUTH DAILY     

 

                Vitamin B-12 1,000 mcg/mL injection solution    2016                       inject 1 milliliter 

(1,000 mcg) by intramuscular route once a month     

 

                potassium chloride 10 mEq oral tablet extended release    2016                      take 1 tablet

 (10 meq) by oral route once daily       

 

                Namenda XR 28 mg oral capsule,sprinkle,ER 24hr    2016                      TAKE 1 CAPSULE BY

 MOUTH EVERY DAY                         

 

                furosemide 40 mg oral tablet    2016                      take 1 tablet (40 mg) by oral route

 once daily                              

 

                mirtazapine 45 mg oral tablet                                    take 1 tablet (45 mg) by oral route once daily

 before bedtime                          

 

             Fish Oil 300-1,000 mg oral capsule                              take 1 capsule by oral route daily     

 

             Vitamin D3 1,000 unit oral tablet                              take 1 tablet by oral route daily     

 

                Calcium 600 600 mg calcium (1,500 mg) oral tablet                                    take 1 tablet by oral route

 daily                                   

 

                Aspirin Low Dose 81 mg oral tablet,delayed release (DR/EC)                                    take 1 tablet 

(81 mg) by oral route once daily         

 

                metoclopramide HCl 10 mg oral tablet    2017                       take 1 tablet by oral route 

2 times a day for 50 days                

 

             furosemide 40 mg oral tablet    2017                 TAKE 1 TABLET BY MOUTH DAILY     

 

                Namenda XR 28 mg oral capsule,sprinkle,ER 24hr    2017                       TAKE 1 CAPSULE BY 

MOUTH EVERY DAY                          

 

                Linzess 72 mcg oral capsule    2017                       take 1 capsule (72 mcg) by oral route

 once daily on an empty stomach at least 30 minutes before 1st meal of the day     



 

             pravastatin 40 mg oral tablet    2017                 TAKE 1 TABLET BY MOUTH DAILY     

 

                metoclopramide HCl 10 mg oral tablet    2017                       TAKE 1 TABLET BY ORAL ROUTE 

2 TIMES A DAY FOR 50 DAYS                

 

                potassium chloride 10 mEq oral tablet extended release    2017                       TAKE 2 TABLETs

 BY MOUTH twice DAILY for one week       

 

                potassium chloride 10 mEq oral tablet extended release    2017                       TAKE 2 TABLETs

 BY MOUTH twice DAILY for one week       

 

             simvastatin 20 mg oral tablet    2017                 TAKE 1 TABLET BY MOUTH AT BEDTIME    

 

 

             famotidine 20 mg oral tablet    2017                 TAKE 1 TABLET BY MOUTH TWICE DAILY    

 

 

             memantine 10 mg oral tablet    2017                 TAKE 1 TABLET BY MOUTH TWICE DAILY     



 

                rivastigmine tartrate 1.5 mg oral capsule    2017                       TAKE 1 CAPSULE BY MOUTH

 TWICE DAILY BEFORE MEALS                

 

             famotidine 20 mg oral tablet    2017                 TAKE 1 TABLET BY MOUTH TWICE DAILY    

 

 

                carbamazepine 200 mg oral tablet    2017                       TAKE 1 TABLET BY MOUTH BEFORE BREAKFAST

 AND TAKE 2 TABLETS AT BEDTIME           

 

                carbamazepine 200 mg oral tablet    2017                       TAKE 1 TABLET BY MOUTH BEFORE BREAKFAST

 AND TAKE 2 TABLETS AT BEDTIME           

 

                aspirin 81 mg oral tablet,delayed release (DR/EC)    2017                       TAKE 1 TABLET BY

 MOUTH EVERY DAY                         

 

             BISACODYL 5MG PV (BOX OF 25)    2017                 TAKE 1 TABLET BY MOUTH EVERY DAY     

 

             MULTI-VITAMINS    2017                 TAKE 1 TABLET BY MOUTH EVERY DAY     









                                         

 

             Name         Start Date    Expiration Date    SIG          Comments

 

             Reglan 10mg    3/29/2010    2010    one ac and hs     

 

                Keflex 500 mg oral capsule    2010       10/1/2010       take 1 capsule (500 mg) by oral

 route every 6 hours for 10 days         

 

                Bactrim -160 mg oral tablet    2011       take 1 tablet by oral route

 every 12 hours for 7 days               

 

                triamcinolone acetonide 0.1 % topical cream    2011      apply a thin

 layer to the affected area(s) by topical route 2 times per day     

 

                sertraline 100 mg oral tablet    4/10/2012       5/10/2012       take 1.5 tablets by oral route

 daily for 30 days                       

 

                ergocalciferol (vitamin D2) 50,000 unit oral capsule    4/15/2013       2013       TAKE

 ONE CAPSULE BY MOUTH ONCE A WEEK        

 

                CYANOCOBALAM 1000MCGINJ 1000 milliliter    2013       INJECT 1ML INTRAMUSCULAR

 ONCE A MONTH                            

 

                pravastatin 40 mg oral tablet    3/25/2014       3/20/2015       TAKE ONE TABLET BY MOUTH EVERY

 DAY                                     

 

                          Zostavax (PF) 19,400 unit/0.65 mL subcutaneous suspension for reconstitution    3/23/2015

                    3/24/2015           inject 0.65 milliliter by subcutaneous route once     

 

                famciclovir 500 mg oral tablet    12/3/2015       12/10/2015      take 1 tablet (500 mg) by

 oral route every 8 hours for 7 days     

 

                furosemide 40 mg oral tablet    2016      take 1 tablet (40 mg) by oral

 route once daily                        

 

                Cipro 500 mg oral tablet    2016       take 1 tablet (500 mg) by oral route

 2 times per day for 5 days              

 

                Bactrim -160 mg oral tablet    2016        take 1 tablet by oral route

 every 12 hours for 7 days               

 

                metoclopramide HCl 10 mg oral tablet    2017       take 1 tablet by oral

 route 2 times a day for 50 days         

 

                Macrobid 100 mg oral capsule    2017       take 1 capsule (100 mg) by oral

 route 2 times per day with food for 7 days     

 

                Augmentin 875-125 mg oral tablet    2017       take 1 tablet by oral route

 every 12 hours for 7 days               

 

                amoxicillin 500 mg oral tablet    2017       take 1 tablet (500 mg) by oral

 route every 12 hours for 7 days         

 

                ciprofloxacin HCl 500 mg oral tablet    2017       take 1 tablet (500 mg)

 by oral route every 12 hours for 5 days     

 

                cefuroxime axetil 500 mg oral tablet    2017        take 1 tablet (500 mg)

 by oral route 2 times per day for 10 days     









                                        Discontinued 

 

             Name         Start Date    Discontinued Date    SIG          Comments

 

                Tylenol 325 mg oral tablet                    2013        take 1 - 2 tablets (325 -650 mg) by oral

 route every 4-6 hours as needed         

 

                Calcium 600 + D(3) 600 mg(1,500mg) -400 unit oral tablet                    2011       take 1 tablet

 by oral route 2 times a day            no longer taking

 

                Vitamin B-12 1,000 mcg oral tablet extended release    2010       take 1

 tablet by oral route daily             no longer taking

 

                Antifungal (clotrimazole) 1 % topical cream    2010       apply to the 

affected and surrounding areas of skin by topical route 2 times per day morning 
and evening                              

 

                sertraline 100 mg oral tablet    5/10/2011       2011       take 2 tablets (200 mg) by 

oral route once daily                   discontinued by Dr. Serrano

 

                mirtazapine 15 mg oral tablet                    2011        take 1 tablet (15 mg) by oral route 

once daily before bedtime               Dr. Serrano

 

                mirtazapine 15 mg oral tablet                    2011        take 1 tablet (15 mg) by oral route 

once daily before bedtime               dc'd by Dr. Serrano

 

                Pristiq 50 mg oral tablet extended release 24 hr                    2013        take 1 tablet (50

 mg) by oral route once daily           Dr. Serrano

 

                Pristiq 50 mg oral tablet extended release 24 hr                    2013        take 1 tablet (50

 mg) by oral route once daily           dose updated

 

                Vitamin B-12 1,000 mcg/mL injection solution    2011        inject 1 milliliter

 (1,000 mcg) by intramuscular route once a month    on list already

 

                    syringe with needle 1 mL 25 gauge x 1" miscellaneous syringe    2011

                          use for injection once a month     

 

                clotrimazole 1 % topical cream    2011        apply to the affected and surrounding

 areas of skin by topical route 2 times per day in the morning and evening     

 

                Vitamin D2 50,000 unit oral capsule    2011        take 1 capsule (50,000

 unit) by oral route once weekly        generic on list

 

                Pravachol 40 mg oral tablet    2012        take 1 tablet (40 mg) by oral 

route once daily for 90 days            generic on list

 

                lithium carbonate 300 mg oral capsule    2012        take 1 capsule by oral

 route daily                            dose updated

 

                Pristiq 100 mg oral tablet extended release 24 hr                    4/10/2012       take 1 and 1/2 

tablet (150 mg) by oral route once daily    Mental Health provider

 

                Pristiq 100 mg oral tablet extended release 24 hr                    4/10/2012       take 1 and 1/2 

tablet (150 mg) by oral route once daily    Discontinued by Dr Efrain Knight at [facility]

 

                hydroxyzine HCl 50 mg oral tablet    10/16/2014      2015       take 1 tablet (50 mg) 

by oral route at bedtime                 

 

                lithium carbonate 300 mg oral capsule    2015       take 1 capsule (300

 mg) by oral route 2 for 30 days         

 

                fluconazole 100 mg oral tablet    2015       12/3/2015       take 1 tablet (100 mg) by 

oral route once a week                   

 

                ketoconazole 2 % topical cream    2015       12/3/2015       apply to the affected area(s)

 by topical route 2 times per day        

 

                prednisone 10 mg oral tablet    12/3/2015       2016        take 2 tablets (20 mg) by oral

 route once daily for 4 days 1 tablet daily for 4 days 0.5 tablet daily for 4 
days                                     

 

                triamcinolone acetonide 0.1 % topical cream    2016       apply a thin layer

 to the affected area(s) by topical route 2 times per day     

 

                Cipro 500 mg oral tablet    1/15/2017       2017       take 1 tablet (500 mg) by oral route

 every 12 hours for 10 days              







Problem List







                    Description         Status              Onset

 

                    Artificial opening status; colostomy    Active               

 

                    Bipolar disorder, unspecified    Active               

 

                    Hyperlipidemia      Active               

 

                    Peritoneal Neoplasm, Malignant    Active               

 

                    Anemia, Pernicious    Active               

 

                    Arthritis unspecified    Active               

 

                    B12 deficiency      Active               







Vital Signs







      Date    Time    BP-Sys(mm[Hg]    BP-Lynn(mm[Hg])    HR(bpm)    RR(rpm)    Temp    WT    HT    HC    BMI

                    BSA                 BMI Percentile      O2 Sat(%)

 

        2017    1:34:00 PM    118 mmHg    62 mmHg    122 bpm    18 rpm    97.8 F    161.375 lbs    

69 in                     23.83 kg/m2    1.89 m2                   96 %

 

        2017    3:05:00 PM    134 mmHg    70 mmHg    70 bpm    20 rpm    97.4 F    181 lbs    69 in

                          26.7288 kg/m    1.9992 m                 98 %

 

        2017    11:07:00 AM    124 mmHg    64 mmHg    62 bpm    17 rpm    98.2 F    181.2 lbs    69

 in                       26.76 kg/m2    2.00 m2                   98 %

 

        1/15/2017    3:34:00 PM    148 mmHg    89 mmHg    69 bpm    20 rpm    98.2 F    179 lbs    69 in

                          26.4334 kg/m    1.9882 m                 98 %

 

       2017    1:51:00 PM    160 mmHg    90 mmHg    100 bpm    20 rpm    96.5 F    179 lbs             

                                                                98 %

 

       2016    3:11:00 PM    134 mmHg    76 mmHg    80 bpm    20 rpm    98 F    163 lbs    69 in     

                24.0706 kg/m    1.8972 m                      98 %

 

        2016    2:04:00 PM    142 mmHg    86 mmHg    68 bpm    16 rpm    98.5 F    166 lbs    63 in

                          29.41 kg/m2    1.83 m2                   100 %

 

        2016    11:27:00 AM    148 mmHg    78 mmHg    90 bpm    20 rpm    98.2 F    153 lbs    69 in

                          22.5939 kg/m    1.8381 m                 96 %

 

        12/3/2015    9:50:00 AM    132 mmHg    70 mmHg    62 bpm    16 rpm    97.9 F    145 lbs    69 in

                          21.41 kg/m2    1.79 m2                   100 %

 

        2015    8:52:00 AM    132 mmHg    68 mmHg    52 bpm    20 rpm    97.8 F    141 lbs    69 in

                          20.8218 kg/m    1.7645 m                 100 %

 

        2015    3:25:00 PM    120 mmHg    62 mmHg    72 bpm    16 rpm    98.1 F    136 lbs    69 in

                          20.08 kg/m2    1.73 m2                   98 %

 

       3/23/2015    2:55:00 PM    130 mmHg    76 mmHg    68 bpm    18 rpm    97 F    140 lbs    69 in    

                20.6742 kg/m    1.7583 m                      98 %

 

        10/16/2014    11:11:00 AM    120 mmHg    66 mmHg    77 bpm    20 rpm    98 F    130 lbs    69 in

                          19.20 kg/m2    1.69 m2                   100 %

 

        2014    3:21:00 PM    130 mmHg    66 mmHg    63 bpm    18 rpm    97.2 F    160 lbs    69 in

                          23.6276 kg/m    1.8797 m                 99 %

 

        2013    10:35:00 AM    132 mmHg    70 mmHg    66 bpm    20 rpm    98.1 F    157 lbs    69 in

                          23.18 kg/m2    1.86 m2                    

 

        2013    1:29:00 PM    132 mmHg    70 mmHg    76 bpm    18 rpm    98.2 F    166 lbs    69 in 

                          24.5137 kg/m    1.9146 m                  

 

       2013    2:46:00 PM    128 mmHg    70 mmHg    76 bpm    16 rpm    98 F    160 lbs    69 in     

                23.63 kg/m2     1.88 m2                          

 

        2011    8:49:00 AM    128 mmHg    78 mmHg    70 bpm    18 rpm    97.9 F    164 lbs    69 in

                          24.2183 kg/m    1.903 m                  

 

     2011    1:31:00 PM    132 mmHg    68 mmHg    84 bpm         97 F    167 lbs                        

                                         

 

        2011    9:09:00 AM    128 mmHg    70 mmHg    72 bpm    18 rpm    98.2 F    163 lbs    64 in 

                          27.9786 kg/m    1.8272 m                  

 

       2011    10:01:00 AM    132 mmHg    70 mmHg    72 bpm    18 rpm    98.2 F    154 lbs             

                                                                 

 

       2011    2:47:00 PM    128 mmHg    70 mmHg    72 bpm    18 rpm    97.8 F    156 lbs             

                                                                 

 

       5/10/2011    3:16:00 PM    144 mmHg    80 mmHg    72 bpm    18 rpm    98.2 F    158 lbs             

                                                                 

 

        2011    10:11:00 AM    132 mmHg    70 mmHg    70 bpm    18 rpm    98.2 F    168 lbs    69 in

                          24.809 kg/m    1.9261 m                  

 

        4/15/2011    10:52:00 AM    110 mmHg    60 mmHg    75 bpm    16 rpm    97.5 F    172.375 lbs    

69 in                     25.46 kg/m2    1.95 m2                   100 %

 

        2011    11:43:00 AM    120 mmHg    82 mmHg    75 bpm    16 rpm    97.2 F    178.5 lbs    69

 in                       26.3596 kg/m    1.9854 m                 100 %

 

        10/15/2010    1:32:00 PM    120 mmHg    70 mmHg    80 bpm    18 rpm    96.6 F    177 lbs    69 in

                          26.14 kg/m2    1.98 m2                   100 %

 

        2010    3:50:00 PM    168 mmHg    100 mmHg    82 bpm    18 rpm    97.8 F    177.5 lbs    69

 in                       26.2119 kg/m    1.9798 m                 97 %

 

        2010    1:21:00 PM    140 mmHg    80 mmHg    59 bpm    16 rpm    97.6 F    173.25 lbs    69 

in                        25.58 kg/m2    1.96 m2                   100 %

 

        2010    3:02:00 PM    140 mmHg    80 mmHg    61 bpm    16 rpm    97.6 F    173.125 lbs    69

 in                       25.5658 kg/m    1.9553 m                 99 %

 

        2010    1:23:00 PM    130 mmHg    80 mmHg    66 bpm    16 rpm    96.8 F    173 lbs    69 in 

                          25.55 kg/m2    1.95 m2                   100 %

 

        2010    12:58:00 PM    130 mmHg    88 mmHg    75 bpm    16 rpm    98.4 F    172.25 lbs    69

 in                       25.4366 kg/m    1.9503 m                 100 %







Social History







                    Name                Description         Comments

 

                    denies alcohol use                         

 

                    denies smoking                           

 

                    Denies illicit substance abuse                         

 

                    retired                                 direct care

 

                    Single                                   

 

                    Exercises regularly                         

 

                    Attended some college                         

 

                    Tobacco             Never smoker         







History of Procedures







                    Date Ordered        Description         Order Status

 

                    2010 12:00 AM    COMPREHEN METABOLIC PANEL    Reviewed

 

                    2010 12:00 AM    COMPLETE CBC W/AUTO DIFF WBC    Reviewed

 

                    2010 12:00 AM    LIPID PANEL         Reviewed

 

                          2015 12:00 AM        B12 Injection, Up to 1000 Mcg NDC#6550-4985-80 [facility] Medicare 

                                        Reviewed

 

                    2011 12:00 AM    MAMMOGRAM SCREENING    Reviewed

 

                    2011 12:00 AM    CYTOPATH C/V THIN LAYER    Reviewed

 

                    2011 12:00 AM    B12 Injection 1 cc NDC#[address]-88    Reviewed

 

                    2015 12:00 AM    THER/PROPH/DIAG INJ SC/IM    Reviewed

 

                    2015 12:00 AM    B12 Injection, Up to 1000 Mcg NDC#8677-8047-72    Reviewed

 

                    2011 12:00 AM    THER/PROPH/DIAG INJ SC/IM    Reviewed

 

                    2011 12:00 AM    B12 Injection([place]) Ndc#1133-6559-33-    Reviewed

 

                    2015 12:00 AM    THER/PROPH/DIAG INJ SC/IM    Reviewed

 

                    2015 12:00 AM    B12 Injection, Up to 1000 Mcg NDC#6111-1462-41    Reviewed

 

                    10/20/2011 12:00 AM    THER/PROPH/DIAG INJ SC/IM    Reviewed

 

                    10/20/2011 12:00 AM    B12 Injection([place]) Ndc#9610-5275-98-    Reviewed

 

                    2016 12:00 AM    THER/PROPH/DIAG INJ SC/IM    Reviewed

 

                    2016 12:00 AM    B12 Injection, Up to 1000 Mcg NDC#0913-2307-05    Reviewed

 

                    3/14/2016 12:00 AM    VITAMIN B-12        Reviewed

 

                    3/15/2016 12:00 AM    THER/PROPH/DIAG INJ SC/IM    Reviewed

 

                    3/15/2016 12:00 AM    B12 Injection, Up to 1000 Mcg NDC#2981-7443-93    Reviewed

 

                    2011 12:00 AM    ***Immunization administration, Medicare flu    Reviewed

 

                    2011 12:00 AM    Fluzone ** MEDICARE Only **    Reviewed

 

                    2011 12:00 AM    THER/PROPH/DIAG INJ SC/IM    Reviewed

 

                    2011 12:00 AM    B12 Injection ([employer] [facility]) Ndc#3611-1915-05    Reviewed

 

                    2016 12:00 AM    B12 Injection, Up to 1000 Mcg NDC#0967-3969-79 [facility]    

Reviewed

 

                    2016 12:00 AM    TTE W/DOPPLER COMPLETE    Reviewed

 

                    2016 12:00 AM    EXTREMITY STUDY     Reviewed

 

                          2016 12:00 AM        B12 Injection, Up to 1000 Mcg NDC#7282-4731-99 [facility] Medicare 

                                        Reviewed

 

                    2016 12:00 AM    THER/PROPH/DIAG INJ SC/IM    Reviewed

 

                    2016 12:00 AM    B12 Injection, Up to 1000 Mcg NDC#2291-5152-34    Reviewed

 

                    2016 12:00 AM    THER/PROPH/DIAG INJ SC/IM    Reviewed

 

                    2012 12:00 AM    B12 Injection([place]) Ndc#7624-5706-95-    Reviewed

 

                    2016 12:00 AM    B12 Injection, Up to 1000 Mcg NDC#6844-0112-98    Reviewed

 

                    2016 12:00 AM    THER/PROPH/DIAG INJ SC/IM    Reviewed

 

                    2012 12:00 AM    THER/PROPH/DIAG INJ SC/IM    Reviewed

 

                    2012 12:00 AM    B12 Injection ([employer]) Ndc#0046-0221-65    Reviewed

 

                    2016 12:00 AM    THER/PROPH/DIAG INJ SC/IM    Reviewed

 

                    2016 12:00 AM    B12 Injection, Up to 1000 Mcg NDC#7017-9391-18    Reviewed

 

                    2016 12:00 AM    B12 Injection, Up to 1000 Mcg NDC#6505-3666-99    Reviewed

 

                    2016 12:00 AM    THER/PROPH/DIAG INJ SC/IM    Reviewed

 

                    2012 12:00 AM    THER/PROPH/DIAG INJ SC/IM    Reviewed

 

                    2012 12:00 AM    B12 Injection([facility]) Ndc#4110-3403-23-    Reviewed

 

                    12/15/2016 12:00 AM    B12 Injection, Up to 1000 Mcg NDC#8814-7739-89    Reviewed

 

                    12/15/2016 12:00 AM    THER/PROPH/DIAG INJ SC/IM    Reviewed

 

                    2016 12:00 AM    URNLS DIP STICK/TABLET RGNT AUTO W/O MICROSCOPY    Reviewed

 

                    1/3/2017 12:00 AM    URNLS DIP STICK/TABLET RGNT AUTO W/O MICROSCOPY    Reviewed

 

                    2017 12:00 AM    URINE CULTURE/COLONY COUNT    Reviewed

 

                    2017 12:00 AM    Rocephin 1 gram Injection, [facility]    Reviewed

 

                    2017 12:00 AM    THERAPEUTIC PROPHYLACTIC/DX INJECTION SUBQ/IM    Reviewed

 

                    2017 12:00 AM    B12 1000mcg Injection, [facility]    Reviewed

 

                    5/3/2012 12:00 AM    THER/PROPH/DIAG INJ SC/IM    Reviewed

 

                    5/3/2012 12:00 AM    B12 Injection([facility]) Ndc#0197-7549-56-    Reviewed

 

                    2017 12:00 AM    THERAPEUTIC PROPHYLACTIC/DX INJECTION SUBQ/IM    Reviewed

 

                    2017 12:00 AM    B12 1000mcg Injection, [facility] [employer]    Reviewed

 

                    2017 12:00 AM    MRI BRAIN STEM W/O & W/DYE    Reviewed

 

                    2017 12:00 AM    VITAMIN B-12        Reviewed

 

                    2017 12:00 AM    Speech Therapy Consult    Reviewed

 

                    2017 12:00 AM    ASSAY OF CREATININE    Reviewed

 

                    2012 12:00 AM    IMMUNOTHERAPY INJECTIONS    Reviewed

 

                    2012 12:00 AM    B12 Injection([facility]) Ndc#3090-2044-58-    Reviewed

 

                    2017 12:00 AM    URINALYSIS AUTO W/SCOPE    Reviewed

 

                    2012 12:00 AM    THER/PROPH/DIAG INJ SC/IM    Reviewed

 

                    2012 12:00 AM    B12 Injection, Up to 1000 Mcg NDC#2462-8464-22    Reviewed

 

                    2017 12:00 AM    URINALYSIS AUTO W/SCOPE    Reviewed

 

                    2017 2:18 PM    URINALYSIS AUTO W/O SCOPE    Reviewed

 

                    2017 12:00 AM    URINE CULTURE/COLONY COUNT    Reviewed

 

                    2017 12:00 AM    B12 1000mcg Injection    Reviewed

 

                    2017 12:00 AM    URNLS DIP STICK/TABLET RGNT AUTO W/O MICROSCOPY    Reviewed

 

                    2017 12:00 AM    METABOLIC PANEL TOTAL CA    Reviewed

 

                    2017 12:00 AM    URNLS DIP STICK/TABLET RGNT AUTO W/O MICROSCOPY    Reviewed

 

                    2012 12:00 AM    THER/PROPH/DIAG INJ SC/IM    Reviewed

 

                    2012 12:00 AM    B12 Injection, Up to 1000 Mcg NDC#0570-6040-49    Reviewed

 

                    2012 12:00 AM    THER/PROPH/DIAG INJ SC/IM    Reviewed

 

                    2012 12:00 AM    B12 Injection, Up to 1000 Mcg NDC#2137-9976-86    Reviewed

 

                    10/16/2012 12:00 AM    THER/PROPH/DIAG INJ SC/IM    Reviewed

 

                    10/16/2012 12:00 AM    B12 Injection, Up to 1000 Mcg NDC#5545-5661-21    Reviewed

 

                    2010 12:00 AM    COMPREHEN METABOLIC PANEL    Reviewed

 

                    2010 12:00 AM    COMPLETE CBC W/AUTO DIFF WBC    Reviewed

 

                    2010 12:00 AM    LIPID PANEL         Reviewed

 

                    2013 12:00 AM    Flu Injection 3 Years And Above NDC# [address]-62  [facility]    Reviewed



 

                    2013 12:00 AM    COMPLETE CBC W/AUTO DIFF WBC    Reviewed

 

                    2013 12:00 AM    ASSAY OF LITHIUM    Reviewed

 

                    2013 12:00 AM    METABOLIC PANEL TOTAL CA    Reviewed

 

                    4/3/2013 12:00 AM    THER/PROPH/DIAG INJ SC/IM    Reviewed

 

                    4/3/2013 12:00 AM    B12 Injection, Up to 1000 Mcg NDC#6081-1508-23    Reviewed

 

                    2013 12:00 AM    THER/PROPH/DIAG INJ SC/IM    Reviewed

 

                    2013 12:00 AM    B12 Injection, Up to 1000 Mcg NDC#1480-1698-25    Reviewed

 

                    2013 12:00 AM    THER/PROPH/DIAG INJ SC/IM    Reviewed

 

                    2013 12:00 AM    B12 Injection, Up to 1000 Mcg NDC#1544-5806-82    Reviewed

 

                    2013 12:00 AM    LIPID PANEL         Reviewed

 

                    2013 12:00 AM    VITAMIN D 25 HYDROXY    Reviewed

 

                    2013 12:00 AM    THER/PROPH/DIAG INJ SC/IM    Reviewed

 

                    2013 12:00 AM    B12 Injection, Up to 1000 Mcg NDC#2312-6086-56    Reviewed

 

                    2013 12:00 AM    THER/PROPH/DIAG INJ SC/IM    Reviewed

 

                    3/6/2014 12:00 AM    THER/PROPH/DIAG INJ SC/IM    Reviewed

 

                    2014 12:00 AM    THER/PROPH/DIAG INJ SC/IM    Reviewed

 

                    2014 12:00 AM    B12 Injection, Up to 1000 Mcg NDC#3658-3635-18    Reviewed

 

                    2010 12:00 AM    SKIN FUNGI CULTURE    Reviewed

 

                    10/9/2010 12:00 AM    COMPREHEN METABOLIC PANEL    Reviewed

 

                    10/9/2010 12:00 AM    LIPID PANEL         Reviewed

 

                    2010 12:00 AM    THER/PROPH/DIAG INJ SC/IM    Reviewed

 

                    2010 12:00 AM    B12 Injection Ndc#[address]-45 ([place])    Reviewed

 

                    2010 12:00 AM    THER/PROPH/DIAG INJ SC/IM    Reviewed

 

                    2010 12:00 AM    Kenalog 40 Mg Im-Ndc#[address]-59 ([facility])    Reviewed

 

                    10/15/2010 12:00 AM    FLU VACCINE 3 YRS & > IM    Reviewed

 

                    10/15/2010 12:00 AM    Admin.Of M/C Cov.Vaccine-Flu Vacc.    Reviewed

 

                    1/15/2011 12:00 AM    COMPLETE CBC W/AUTO DIFF WBC    Reviewed

 

                    1/15/2011 12:00 AM    COMPREHEN METABOLIC PANEL    Reviewed

 

                    1/15/2011 12:00 AM    LIPID PANEL         Reviewed

 

                    2014 12:00 AM    MAMMOGRAM SCREENING    Reviewed

 

                    2014 12:00 AM    Screening mammography, bilateral    Reviewed

 

                    7/10/2014 12:00 AM    THER/PROPH/DIAG INJ SC/IM    Reviewed

 

                    7/10/2014 12:00 AM    B12 Injection, Up to 1000 Mcg NDC#6567-3860-15    Reviewed

 

                    2011 12:00 AM    COMPLETE CBC W/AUTO DIFF WBC    Reviewed

 

                    2011 12:00 AM    COMPREHEN METABOLIC PANEL    Reviewed

 

                    2011 12:00 AM    LIPID PANEL         Reviewed

 

                    2014 12:00 AM    B12 Injection, Up to 1000 Mcg NDC#9551-6496-39    Reviewed

 

                    10/19/2014 12:00 AM    MAMMOGRAM SCREENING    Reviewed

 

                    10/19/2014 12:00 AM    Screening mammography, bilateral    Reviewed

 

                    10/16/2014 12:00 AM    B12 Injection, Up to 1000 Mcg NDC#5278-4715-86    Reviewed

 

                    10/16/2014 12:00 AM    COMPLETE CBC W/AUTO DIFF WBC    Reviewed

 

                    10/16/2014 12:00 AM    COMPREHEN METABOLIC PANEL    Reviewed

 

                    10/16/2014 12:00 AM    IMMUNOASSAY TUMOR     Reviewed

 

                    10/16/2014 12:00 AM    LIPID PANEL         Reviewed

 

                    10/16/2014 12:00 AM    ASSAY OF LITHIUM    Reviewed

 

                    10/16/2014 12:00 AM    MAMMOGRAM SCREENING    Reviewed

 

                    2011 12:00 AM    ASSAY OF PARATHORMONE    Reviewed

 

                    2011 12:00 AM    VITAMIN D 25 HYDROXY    Reviewed

 

                    2011 12:00 AM    ASSAY OF LITHIUM    Reviewed

 

                    2011 12:00 AM    METABOLIC PANEL TOTAL CA    Reviewed

 

                    2011 12:00 AM    CT HEAD/BRAIN W/O & W/DYE    Reviewed

 

                    3/23/2015 12:00 AM    PNEUMOCOCCAL VACC 13 GLENDY IM    Reviewed

 

                    3/23/2015 12:00 AM    Vitamin B12 injection    Reviewed

 

                    2011 12:00 AM    ASSAY OF LITHIUM    Reviewed

 

                    2011 12:00 AM    B12 Injection Ndc#[address]-45  [place]    Reviewed

 

                    2015 12:00 AM    THER/PROPH/DIAG INJ SC/IM    Reviewed

 

                    2015 12:00 AM    B12 Injection, Up to 1000 Mcg NDC#9486-2189-67    Reviewed

 

                    2015 12:00 AM    COMPLETE CBC W/AUTO DIFF WBC    Reviewed

 

                    2015 12:00 AM    COMPREHEN METABOLIC PANEL    Reviewed

 

                    2015 12:00 AM    LIPID PANEL         Reviewed

 

                    2015 12:00 AM    ASSAY OF LITHIUM    Reviewed

 

                    2011 12:00 AM    VIT D 1 25-DIHYDROXY    Reviewed

 

                    2011 12:00 AM    VITAMIN B-12        Reviewed

 

                    2015 12:00 AM    B12 Injection, Up to 1000 Mcg NDC#0801-5127-63    Reviewed

 

                    2015 12:00 AM    THER/PROPH/DIAG INJ SC/IM    Reviewed

 

                    2015 12:00 AM    B12 Injection, Up to 1000 Mcg NDC#3525-6879-28    Reviewed

 

                    2011 12:00 AM    THER/PROPH/DIAG INJ SC/IM    Reviewed

 

                    2011 12:00 AM    B12 Injection ([facility]) Ndc#8984-7209-04    Reviewed

 

                    2015 12:00 AM    THER/PROPH/DIAG INJ SC/IM    Reviewed

 

                    2015 12:00 AM    B12 Injection, Up to 1000 Mcg NDC#5838-0084-84    Reviewed







Results Summary







                          Date and Description      Results

 

                          2004 12:00 AM        Colonoscopy-Women and Men over 50 Normal 

 

                          2008 12:00 AM         Pap Smear Declined 

 

                          10/7/2009 12:00 AM        Cholest Cry Stone Ql .0 %LDLc SerPl-mCnc 123.0 mg/dLHDLc

 SerPl-mCnc 34.0 mg/dLTrigl SerPl-mCnc 190.0 mg/dLGlucose SerPl-mCnc 78.0 mg/dL

 

                          2009 12:00 AM        Mammogram -Women over 40 Normal HIV1+2 Ab Ser Ql no risk 

 

                          2010 8:47 AM         Dexa Bone Scan Refused Aspirin reccommended Contraindication 



 

                          2010 8:48 AM         Depression Done 

 

                          2010 12:00 AM         Foot Exam-Diabetic Done 

 

                          2010 12:00 AM         Cholest Cry Stone Ql .0 %LDLc SerPl-mCnc 126.0 mg/dLGlucose

 SerPl-mCnc 102.0 mg/dL

 

                          2010 8:45 AM          TRIGLYCERIDES 122.0 mg/dLCHOLESTEROL 186.0 mg/dLHDL 36.0 mg/dLTOT

 CHOL/HDL 5.2 LDL (CALC) 126.0 mg/dLGLUCOSE 102.0 mg/dLSODIUM 143.0 
mmol/LPOTASSIUM 3.70 mmol/LCHLORIDE 111.0 mmol/LCO2 23.0 mmol/LBUN 10.0 
mg/dLCREATININE 0.80 mg/dLSGOT/AST 12.0 IU/LSGPT/ALT 11.0 IU/LALK PHOS 65.0 
IU/LTOTAL PROTEIN 7.20 g/dLALBUMIN 3.90 g/dLTOTAL BILI 0.50 mg/dLCALCIUM 10.20 
mg/dLAGE 59 GFR NonAA 73 GFR AA 88 eGFR >60 mL/min/1.73 m2eGFR AA* >60 WBC 5.7 
RBC 3.26 HGB 10.60 g/dLHCT 31.70 %MCV 97.0 fLMCH 32.50 pgMCHC 33.40 g/dLRDW SD 
47 RDW CV 13.30 %MPV 9.70 fLPLT 287 NRBC# 0.00 NRBC% 0.0 %NEUT 62.90 %%LYMP 
21.80 %%MONO 9.90 %%EOS 5.0 %%BASO 0.40 %#NEUT 3.56 #LYMP 1.23 #MONO 0.56 #EOS 
0.28 #BASO 0.02 MANUAL DIFF NOT IND 

 

                          2010 12:00 AM        Glucose SerPl-mCnc 96.0 mg/dLCholest Cry Stone Ql .0 %LDLc

 SerPl-mCnc 146.0 mg/dL

 

                          2010 8:26 AM         TRIGLYCERIDES 106.0 mg/dLCHOLESTEROL 199.0 mg/dLHDL 32.0 mg/dLTOT

 CHOL/HDL 6.2 LDL (CALC) 146.0 mg/dLGLUCOSE 96.0 mg/dLSODIUM 143.0 
mmol/LPOTASSIUM 4.0 mmol/LCHLORIDE 113.0 mmol/LCO2 24.0 mmol/LBUN 13.0 
mg/dLCREATININE 1.0 mg/dLSGOT/AST 11.0 IU/LSGPT/ALT 6.0 IU/LALK PHOS 56.0 
IU/LTOTAL PROTEIN 6.60 g/dLALBUMIN 3.80 g/dLTOTAL BILI 0.50 mg/dLCALCIUM 9.30 
mg/dLAGE 59 GFR NonAA 57 GFR AA 69 eGFR 57 eGFR AA* >60 

 

                          10/6/2010 12:00 AM        Cholest Cry Stone Ql .0 %LDLc SerPl-mCnc 111.0 mg/dLGlucose

 SerPl-mCnc 81.0 mg/dL

 

                          10/6/2010 2:45 PM         TRIGLYCERIDES 123.0 mg/dLCHOLESTEROL 178.0 mg/dLHDL 42.0 mg/dLTOT

 CHOL/HDL 4.2 LDL (CALC) 111.0 mg/dLGLUCOSE 81.0 mg/dLSODIUM 139.0 
mmol/LPOTASSIUM 4.10 mmol/LCHLORIDE 106.0 mmol/LCO2 24.0 mmol/LBUN 13.0 
mg/dLCREATININE 0.90 mg/dLSGOT/AST 13.0 IU/LSGPT/ALT 11.0 IU/LALK PHOS 61.0 
IU/LTOTAL PROTEIN 7.10 g/dLALBUMIN 3.90 g/dLTOTAL BILI 0.30 mg/dLCALCIUM 9.30 
mg/dLAGE 60 GFR NonAA 64 GFR AA 78 eGFR >60 mL/min/1.73 m2eGFR AA* >60 WBC 6.9 
RBC 3.59 HGB 11.50 g/dLHCT 35.30 %MCV 98.0 fLMCH 32.0 pgMCHC 32.60 g/dLRDW SD 46
 RDW CV 12.90 %MPV 9.90 fLPLT 311 NRBC# 0.00 NRBC% 0.0 %NEUT 64.90 %%LYMP 22.50 
%%MONO 7.20 %%EOS 5.10 %%BASO 0.30 %#NEUT 4.45 #LYMP 1.54 #MONO 0.49 #EOS 0.35 
#BASO 0.02 MANUAL DIFF NOT IND 

 

                          2011 12:00 AM         Mammogram -Women over 40 Ordered 

 

                          2011 10:25 AM        TRIGLYCERIDES 111.0 mg/dLCHOLESTEROL 195.0 mg/dLHDL 43.0 mg/dLTOT

 CHOL/HDL 4.5 LDL (CALC) 130.0 mg/dLWBC 5.3 RBC 3.76 HGB 12.0 g/dLHCT 37.80 %MCV
 101.0 fLMCH 31.90 pgMCHC 31.70 g/dLRDW SD 47 RDW CV 13.0 %MPV 9.70 fLPLT 259 
NRBC# 0.00 NRBC% 0.0 %NEUT 69.0 %%LYMP 17.60 %%MONO 8.30 %%EOS 4.70 %%BASO 0.40 
%#NEUT 3.63 #LYMP 0.93 #MONO 0.44 #EOS 0.25 #BASO 0.02 MANUAL DIFF NOT IND 
GLUCOSE 102.0 mg/dLSODIUM 146.0 mmol/LPOTASSIUM 4.20 mmol/LCHLORIDE 113.0 
mmol/LCO2 23.0 mmol/LBUN 15.0 mg/dLCREATININE 1.0 mg/dLSGOT/AST 12.0 
IU/LSGPT/ALT 17.0 IU/LALK PHOS 60.0 IU/LTOTAL PROTEIN 6.90 g/dLALBUMIN 4.20 
g/dLTOTAL BILI 0.40 mg/dLCALCIUM 9.70 mg/dLAGE 60 GFR NonAA 57 GFR AA 69 eGFR 57
 eGFR AA* >60 

 

                          2011 11:49 AM        Chol[place] Cry Stone Ql .0 %LDLc SerPl-mCnc 130.0 mg/dLHDLc

 SerPl-mCnc 43.0 mg/dLTrigl SerPl-mCnc 111.0 mg/dLGlucose SerPl-mCnc 102.0 mg/dL

 

                          2011 11:52 AM        Pap Smear Declined 

 

                          2011 11:28 AM        Lithium 2.080 mmol/LGLUCOSE 102.0 mg/dLSODIUM 135.0 mmol/LPOTASSIUM

 3.90 mmol/LCHLORIDE 106.0 mmol/LCO2 21.0 mmol/LBUN 12.0 mg/dLCREATININE 1.30 
mg/dLCALCIUM 10.70 mg/dLAGE 60 GFR NonAA 42 GFR AA 51 eGFR 42 eGFR AA* 51 

 

                          2011 8:58 AM          Lithium 0.690 mmol/L

 

                          2011 2:38 PM         VITAMIN B12 3483.0 pg/mL

 

                          2013 3:35 PM          WBC 5.1 RBC 3.73 HGB 11.70 g/dLHCT 36.40 %MCV 98.0 fLMCH 31.40

 pgMCHC 32.10 g/dLRDW SD 47 RDW CV 13.10 %MPV 9.80 fLPLT 224 NRBC# 0.00 NRBC% 
0.0 %NEUT 66.80 %%LYMP 19.10 %%MONO 9.0 %%EOS 4.90 %%BASO 0.20 %#NEUT 3.42 #LYMP
 0.98 #MONO 0.46 #EOS 0.25 #BASO 0.01 MANUAL DIFF NOT IND GLUCOSE 88.0 
mg/dLSODIUM 141.0 mmol/LPOTASSIUM 4.10 mmol/LCHLORIDE 110.0 mmol/LCO2 22.0 
mmol/LBUN 22.0 mg/dLCREATININE 1.10 mg/dLCALCIUM 9.80 mg/dLAGE 62 GFR NonAA 50 
GFR AA 61 eGFR 50 eGFR AA* 60 Lithium 0.760 mmol/L

 

                          2013 11:02 AM        TRIGLYCERIDES 106.0 mg/dLCHOLESTEROL 181.0 mg/dLHDL 46.0 mg/dLTOT

 CHOL/HDL 3.9 LDL (CALC) 114.0 mg/dLVITAMIN D 41.10 ng/mL

 

                          10/17/2014 10:10 AM       WBC 5.0 RBC 3.66 HGB 11.60 g/dLHCT 36.80 %.0 fLMCH 31.70

 pgMCHC 31.50 g/dLRDW SD 50 RDW CV 13.50 %MPV 10.10 fLPLT 209 NRBC# 0.00 NRBC% 
0.0 %NEUT 69.20 %%LYMP 21.0 %%MONO 6.40 %%EOS 3.20 %%BASO 0.20 %#NEUT 3.46 #LYMP
 1.05 #MONO 0.32 #EOS 0.16 #BASO 0.01 MANUAL DIFF NOT IND GLUCOSE 100.0 
mg/dLSODIUM 148.0 mmol/LPOTASSIUM 3.90 mmol/LCHLORIDE 114.0 mmol/LCO2 26.0 
mmol/LBUN 12.0 mg/dLCREATININE 1.20 mg/dLSGOT/AST 9.0 IU/LSGPT/ALT <6 IU/LALK 
PHOS 82.0 IU/LTOTAL PROTEIN 6.90 g/dLALBUMIN 4.0 g/dLTOTAL BILI 0.40 
mg/dLCALCIUM 10.50 mg/dLAGE 64 GFR NonAA 45 GFR AA 55 eGFR 45 eGFR AA* 55 
TRIGLYCERIDES 96.0 mg/dLCHOLESTEROL 155.0 mg/dLHDL 38.0 mg/dLTOT CHOL/HDL 4.1 
LDL (CALC) 98.0 mg/dLLithium 0.850 mmol/LCancer Antigen (CA) 125 8.30 U/mL

 

                          2015 10:25 AM        Lithium 0.790 mmol/LWBC 4.8 RBC 3.44 HGB 11.0 g/dLHCT 35.20 

%.0 fLMCH 32.0 pgMCHC 31.30 g/dLRDW SD 53 RDW CV 14.0 %MPV 9.30 fLPLT 210
 NRBC# 0.00 NRBC% 0.0 %NEUT 70.80 %%LYMP 17.20 %%MONO 8.10 %%EOS 3.50 %%BASO 
0.40 %#NEUT 3.41 #LYMP 0.83 #MONO 0.39 #EOS 0.17 #BASO 0.02 MANUAL DIFF NOT IND 
TRIGLYCERIDES 107.0 mg/dLCHOLESTEROL 174.0 mg/dLHDL 43.0 mg/dLTOT CHOL/HDL 4.0 
LDL (CALC) 110.0 mg/dLGLUCOSE 90.0 mg/dLSODIUM 145.0 mmol/LPOTASSIUM 3.80 
mmol/LCHLORIDE 115.0 mmol/LCO2 24.0 mmol/LBUN 17.0 mg/dLCREATININE 1.30 
mg/dLSGOT/AST 18.0 IU/LSGPT/ALT 17.0 IU/LALK PHOS 56.0 IU/LTOTAL PROTEIN 6.70 
g/dLALBUMIN 3.90 g/dLTOTAL BILI 0.40 mg/dLCALCIUM 9.80 mg/dLAGE 64 GFR NonAA 41 
GFR AA 50 eGFR 41 eGFR AA* 50 

 

                          2015 8:50 AM        WBC 5.8 RBC 3.29 HGB 10.70 g/dLHCT 34.0 %.0 fLMCH 32.50

 pgMCHC 31.50 g/dLRDW SD 52 RDW CV 13.60 %MPV 9.60 fLPLT 223 NRBC# 0.00 NRBC% 
0.0 %NEUT 69.60 %%LYMP 18.90 %%MONO 8.50 %%EOS 2.80 %%BASO 0.20 %#NEUT 4.03 
#LYMP 1.09 #MONO 0.49 #EOS 0.16 #BASO 0.01 MANUAL DIFF NOT IND Lithium 0.620 
mmol/LGLUCOSE 83.0 mg/dLSODIUM 139.0 mmol/LPOTASSIUM 3.90 mmol/LCHLORIDE 109.0 
mmol/LCO2 22.0 mmol/LBUN 19.0 mg/dLCREATININE 1.40 mg/dLSGOT/AST 19.0 
IU/LSGPT/ALT 21.0 IU/LALK PHOS 55.0 IU/LTOTAL PROTEIN 6.50 g/dLALBUMIN 3.90 
g/dLTOTAL BILI 0.50 mg/dLCALCIUM 9.60 mg/dLAGE 65 GFR NonAA 38 GFR AA 46 eGFR 38
 eGFR AA* 46 TRIGLYCERIDES 121.0 mg/dLCHOLESTEROL 192.0 mg/dLHDL 51.0 mg/dLTOT 
CHOL/HDL 3.8 .0 mg/dLFREE T4 0.79 TSH 1.210 uIU/mLHemoglobin A1c 5.40 
%Estim. Avg Glu (eAG) 108 

 

                          3/15/2016 8:08 AM         VITAMIN B12 696.0 pg/mL

 

                          3/23/2016 8:26 AM         WBC 7.0 RBC 3.61 HGB 11.80 g/dLHCT 37.70 %.0 fLMCH 32.70

 pgMCHC 31.30 g/dLRDW SD 49 RDW CV 12.50 %MPV 10.0 fLPLT 207 NRBC# 0.00 NRBC% 
0.0 %NEUT 73.60 %%LYMP 16.40 %%MONO 6.60 %%EOS 3.0 %%BASO 0.30 %#NEUT 5.15 #LYMP
 1.15 #MONO 0.46 #EOS 0.21 #BASO 0.02 MANUAL DIFF NOT IND Lithium 0.940 
mmol/LGLUCOSE 108.0 mg/dLSODIUM 143.0 mmol/LPOTASSIUM 4.30 mmol/LCHLORIDE 110.0 
mmol/LCO2 27.0 mmol/LBUN 16.0 mg/dLCREATININE 1.60 mg/dLSGOT/AST 13.0 
IU/LSGPT/ALT 7.0 IU/LALK PHOS 71.0 IU/LTOTAL PROTEIN 6.80 g/dLALBUMIN 4.0 
g/dLTOTAL BILI 0.20 mg/dLCALCIUM 10.40 mg/dLAGE 65 GFR NonAA 32 GFR AA 39 eGFR 
32 eGFR AA* 39 TRIGLYCERIDES 113.0 mg/dLCHOLESTEROL 169.0 mg/dLHDL 42.0 mg/dLTOT
 CHOL/HDL 4.0 LDL (CALC) 104.0 mg/dLFREE T4 0.86 TSH 2.20 uIU/mLHemoglobin A1c 
5.20 %Estim. Avg Glu (eAG) 103 

 

                          3/25/2016 9:17 AM         COLOR YELLOW APPEARANCE CLEAR SPEC GRAV 1.010 pH 7.0 PROTEIN 

NEGATIVE GLUCOSE NEGATIVE mg/dLKETONE NEGATIVE BILIRUBIN NEGATIVE BLOOD NEGATIVE
 NITRITE NEGATIVE [place] SCREEN SMALL MICRO IND? SEE BELOW WBC/HPF 0-5 RBC/HPF 
NEGATIVE CASTS/LPF NEGATIVE /LPFCRYSTALS NEGATIVE MUCOUS THRDS NEGATIVE BACTERIA
 NEGATIVE EPITH CELLS FEW SQUAMOUS /HPFTRICHOMONAS NEGATIVE YEAST NEGATIVE 

 

                          2016 6:00 AM        GLUCOSE 91.0 mg/dLSODIUM 143.0 mmol/LPOTASSIUM 3.60 mmol/LCHLORIDE

 112.0 mmol/LCO2 23.0 mmol/LBUN 22.0 mg/dLCREATININE 1.20 mg/dLSGOT/AST 15.0 
IU/LSGPT/ALT 12.0 IU/LALK PHOS 61.0 IU/LTOTAL PROTEIN 5.40 g/dLALBUMIN 3.10 
g/dLTOTAL BILI 0.40 mg/dLCALCIUM 8.40 mg/dLAGE 66 GFR NonAA 45 GFR AA 55 eGFR 45
 eGFR AA* 55 WBC 3.0 RBC 3.05 HGB 9.80 g/dLHCT 32.10 %.0 fLMCH 32.10 
pgMCHC 30.50 g/dLRDW SD 54 RDW CV 14.20 %MPV 10.10 fLPLT 170 NRBC# 0.00 NRBC% 
0.0 %NEUT 50.70 %%LYMP 32.60 %%MONO 10.50 %%EOS 5.90 %%BASO 0.30 %#NEUT 1.54 
#LYMP 0.99 #MONO 0.32 #EOS 0.18 #BASO 0.01 MANUAL DIFF NOT IND 

 

                          2016 2:09 PM        COLOR YELLOW APPEARANCE CLEAR SPEC GRAV 1.010 pH 5.0 PROTEIN

 30 GLUCOSE NEGATIVE mg/dLKETONE NEGATIVE BILIRUBIN NEGATIVE BLOOD LARGE NITRITE
 NEGATIVE [place] SCREEN MODERATE MICRO INDICATED? SEE BELOW WBC/HPF  RBC/HPF
 20-50 CASTS/LPF NEGATIVE /LPFCRYSTALS NEGATIVE MUCOUS THRDS NEGATIVE BACTERIA 
NEGATIVE EPITH CELLS FEW SQUAMOUS /HPFTRICHOMONAS NEGATIVE YEAST NEGATIVE CULT 
SET UP? YES 

 

                          1/3/2017 4:08 PM          COLOR YELLOW APPEARANCE HAZY SPEC GRAV 1.015 pH 6.0 PROTEIN 30

 GLUCOSE NEGATIVE mg/dLKETONE NEGATIVE BILIRUBIN NEGATIVE BLOOD MODERATE NITRITE
 NEGATIVE [place] SCREEN LARGE MICRO INDICATED? SEE BELOW WBC/-200 RBC/HPF 
5-10 CASTS/LPF NEGATIVE /LPFCRYSTALS NEGATIVE MUCOUS THRDS NEGATIVE BACTERIA 
NEGATIVE EPITH CELLS 1+ SQUAMOUS /HPFTRICHOMONAS NEGATIVE YEAST NEGATIVE CULT 
SET UP? YES 

 

                          2017 4:24 PM         CREATININE 1.50 mg/dLAGE 66 GFR NonAA 35 GFR AA 42 eGFR 35 eGFR

 AA* 42 VITAMIN B12 1324.0 pg/mL

 

                          2017 11:30 AM         GLUCOSE 93.0 mg/dLSODIUM 143.0 mmol/LPOTASSIUM 3.10 mmol/LCHLORIDE

 101.0 mmol/LCO2 29.0 mmol/LBUN 26.0 mg/dLCREATININE 1.50 mg/dLSGOT/AST 23.0 
IU/LSGPT/ALT 13.0 IU/LALK PHOS 66.0 IU/LTOTAL PROTEIN 7.70 g/dLALBUMIN 4.30 
g/dLTOTAL BILI 0.40 mg/dLCALCIUM 10.30 mg/dLAGE 66 GFR NonAA 35 GFR AA 42 eGFR 
35 eGFR AA* 42 TRIGLYCERIDES 147.0 mg/dLCHOLESTEROL 184.0 mg/dLHDL 44.0 mg/dLTOT
 CHOL/HDL 4.2 .0 mg/dLWBC 5.4 RBC 3.98 HGB 12.90 g/dLHCT 40.20 %.0
 fLMCH 32.40 pgMCHC 32.10 g/dLRDW SD 50 RDW CV 13.50 %MPV 9.30 fLPLT 210 NRBC# 
0.00 NRBC% 0.0 %NEUT 54.20 %%LYMP 30.70 %%MONO 9.10 %%EOS 5.20 %%BASO 0.40 
%#NEUT 2.94 #LYMP 1.66 #MONO 0.49 #EOS 0.28 #BASO 0.02 MANUAL DIFF NOT IND FREE 
T4 1.09 COLOR YELLOW APPEARANCE CLEAR SPEC GRAV <=1.005 pH 5.5 PROTEIN NEGATIVE 
GLUCOSE NEGATIVE mg/dLKETONE NEGATIVE BILIRUBIN NEGATIVE BLOOD NEGATIVE NITRITE 
NEGATIVE LEUK SCREEN LARGE MICRO INDICATED? SEE BELOW WBC/HPF 10-20 RBC/HPF 0-5 
CASTS/LPF NEGATIVE /LPFCRYSTALS NEGATIVE MUCOUS THRDS NEGATIVE BACTERIA FEW 
EPITH CELLS 1+ SQUAMOUS /HPFTRICHOMONAS NEGATIVE YEAST NEGATIVE CULT SET UP? YES
 TSH 1.820 uIU/mL

 

                          2017 2:45 PM         COLOR YELLOW APPEARANCE CLEAR SPEC GRAV <=1.005 pH 6.0 PROTEIN

 NEGATIVE GLUCOSE NEGATIVE mg/dLKETONE NEGATIVE BILIRUBIN NEGATIVE BLOOD TRACE-
INTACT NITRITE NEGATIVE [place] SCREEN SMALL MICRO INDICATED? SEE BELOW WBC/HPF 0-5
 RBC/HPF NEGATIVE CASTS/LPF NEGATIVE /LPFCRYSTALS NEGATIVE MUCOUS THRDS NEGATIVE
 BACTERIA FEW EPITH CELLS FEW SQUAMOUS /HPFTRICHOMONAS NEGATIVE YEAST NEGATIVE 
CULT SET UP? YES 

 

                          2017 11:22 AM        COLOR YELLOW APPEARANCE CLEAR SPEC GRAV <=1.005 pH 6.5 PROTEIN

 NEGATIVE GLUCOSE NEGATIVE mg/dLKETONE NEGATIVE BILIRUBIN NEGATIVE BLOOD 
NEGATIVE NITRITE NEGATIVE LEUK SCREEN NEGATIVE MICRO INDICATED? NOT INDICATED 

 

                          2017 2:18 PM         Clarity Ur cloudy Color Ur dark yellow Glucose Ur-sCnc negative

 Bilirub Ur Ql Strip negative Ketones Ur Ql Strip negative Sp Gr Ur Qn 1.010 Hgb
 Ur Ql Strip trace-intact pH Ur-LsCnc 6.5 Prot Ur Ql Strip trace Urobilinogen 
Ur-mCnc 0.2 Nitrite Ur Ql Strip negative WBC Est Ur Ql Strip large 

 

                          2017 9:28 AM         GLUCOSE 109.0 mg/dLSODIUM 142.0 mmol/LPOTASSIUM 3.60 mmol/LCHLORIDE

 106.0 mmol/LCO2 23.0 mmol/LBUN 11.0 mg/dLCREATININE 1.30 mg/dLCALCIUM 9.80 
mg/dLAGE 66 GFR NonAA 41 GFR AA 50 eGFR 41 eGFR AA* 50 

 

                          2017 9:52 AM         COLOR YELLOW APPEARANCE CLOUDY SPEC GRAV <=1.005 pH 6.5 PROTEIN

 NEGATIVE GLUCOSE NEGATIVE mg/dLKETONE NEGATIVE BILIRUBIN NEGATIVE BLOOD SMALL 
NITRITE POSITIVE [place] SCREEN LARGE MICRO INDICATED? SEE BELOW WBC/-300 
RBC/HPF 5-10 CASTS/LPF NEGATIVE /LPFCRYSTALS NEGATIVE MUCOUS THRDS NEGATIVE 
BACTERIA 2++ EPITH CELLS 1+ SQUAMOUS /HPFTRICHOMONAS NEGATIVE YEAST NEGATIVE 
CULT SET UP? YES 

 

                          2017 10:41 AM         COLOR YELLOW APPEARANCE CLEAR SPEC GRAV <=1.005 pH 6.0 PROTEIN

 NEGATIVE GLUCOSE NEGATIVE mg/dLKETONE NEGATIVE BILIRUBIN NEGATIVE BLOOD 
NEGATIVE NITRITE NEGATIVE LEUK SCREEN TRACE MICRO INDICATED? SEE BELOW WBC/HPF 
0-5 RBC/HPF RARE CASTS/LPF NEGATIVE /LPFCRYSTALS NEGATIVE MUCOUS THRDS NEGATIVE 
BACTERIA FEW EPITH CELLS 1+ SQUAMOUS /HPFTRICHOMONAS NEGATIVE YEAST NEGATIVE 
CULT SET UP? NO 







History Of Immunizations







       Name    Date Admin    Mfg Name    Mfg Code    Trade Name    Lot#    Route    Inj    Vis Given    Vis

 Pub                                    CVX

 

        Influenza    2008    Not Entered    NE      Not Entered            Not Entered    Not Entered

                    1            999

 

        X       12/19/2008    [employer]    MSD     Pneumovax 23            Intramuscular    Not Entered

                    1            999

 

           Influenza    10/15/2010    [employer]    NOV        Fluvirin > 12 Years    

[id number]     Intramuscular    Left Deltoid    10/15/2010    2009    999

 

          X         3/23/2015    [employer]    [facility]       Prevnar 13    [id number]    Intramuscular

                Right Gluteous Medius    3/23/2015       2013       109







History of Past Illness







                    Name                Date of Onset       Comments

 

                    Peritoneal Neoplasm, Malignant                         

 

                    Hyperlipidemia                           

 

                    Bipolar disorder, unspecified                         

 

                    Artificial opening status; colostomy                         

 

                    B12 deficiency                           

 

                    Anemia, Pernicious                         

 

                    Arthritis unspecified                         

 

                    cervical cancer                          

 

                    Artificial opening status; colostomy    2010  1:10PM     

 

                    Bipolar disorder, unspecified    2010  1:10PM     

 

                    Hyperlipidemia      2010  1:10PM     

 

                    Anemia, Pernicious    2010  1:10PM     

 

                    Postoperative Follow-Up    2010  1:55PM     

 

                    Postoperative Follow-Up    Mar  8 2010 10:57AM     

 

                    Artificial opening status; colostomy    Mar  8 2010  1:19PM     

 

                    Peritoneal Neoplasm, Malignant    Mar  8 2010  1:19PM     

 

                    Artificial opening status; colostomy    2010  1:40PM     

 

                    Hyperlipidemia      2010  1:40PM     

 

                    Anemia, Pernicious    2010  1:40PM     

 

                    Peritoneal Neoplasm, Malignant    2010  1:40PM     

 

                    Arthritis unspecified    2010  1:40PM     

 

                    Anemia of Chronic Illness    2010  1:40PM     

 

                    Tinea corporis      2010  3:17PM     

 

                    Bipolar disorder, unspecified    2010  1:33PM     

 

                    Hyperlipidemia      2010  1:33PM     

 

                    Anemia, Pernicious    2010  1:33PM     

 

                    Peritoneal Neoplasm, Malignant    2010  1:33PM     

 

                    B12 deficiency      2010  1:33PM     

 

                    Ethmoidal Sinusitis, Acute    Sep 21 2010  3:53PM     

 

                    Wheezing            Sep 21 2010  3:53PM     

 

                    Flu                 Oct 15 2010  1:40PM     

 

                    Bipolar disorder, unspecified    Oct 15 2010  1:42PM     

 

                    Hyperlipidemia      Oct 15 2010  1:42PM     

 

                    Anemia, Pernicious    Oct 15 2010  1:42PM     

 

                    Peritoneal Neoplasm, Malignant    Oct 15 2010  1:42PM     

 

                    Bipolar disorder, unspecified    2011 12:01PM     

 

                    Hyperlipidemia      2011 12:01PM     

 

                    Anemia, Pernicious    2011 12:01PM     

 

                    Peritoneal Neoplasm, Malignant    2011 12:01PM     

 

                    Bipolar disorder, unspecified    Apr 15 2011 10:55AM     

 

                    Major Depression    2011 10:11AM     

 

                    Bipolar Disorder    2011 10:11AM     

 

                    Cancer              May 10 2011  4:16PM     

 

                    Major Depression    May 10 2011  3:16PM     

 

                    Bipolar Disorder    May 10 2011  3:16PM     

 

                    Hypercalcemia       May 23 2011  2:47PM     

 

                    Bipolar disorder, unspecified    May 23 2011  2:47PM     

 

                    Colon Cancer, Personal History    May 23 2011  2:47PM     

 

                    Bipolar Disorder    May 31 2011  4:39PM     

 

                    Depressive Disorder    2011 10:01AM     

 

                    Vitamin B12 deficiency    2011 10:01AM     

 

                    Vitamin D Deficiency    2011  5:07PM     

 

                    Anemia, Vitamin B12 Deficiency    2011  5:07PM     

 

                    B12 deficiency      2011  3:56PM     

 

                    Routine gynecological examination    Aug  4 2011  9:08AM     

 

                    Screening Examination for Breast Cancer    Aug  4 2011  9:08AM     

 

                    Tinea Corporis      Aug  4 2011  9:08AM     

 

                    Depressive Disorder    Sep 23 2011  8:47AM     

 

                    Contact Dermatitis    Sep 23 2011  8:47AM     

 

                    Anemia, Pernicious    Sep 23 2011  8:47AM     

 

                    B12 deficiency      Sep 23 2011  8:47AM     

 

                    B12 deficiency      Sep 27 2011  2:58PM     

 

                    B12 deficiency      Oct 20 2011  2:34PM     

 

                    Flu                 Dec  9 2011  3:16PM     

 

                    B12 deficiency      Dec  9 2011  3:17PM     

 

                    B12 deficiency      2012  4:52PM     

 

                    B12 deficiency      2012 11:10AM     

 

                    B12 deficiency      2012  3:37PM     

 

                    B12 deficiency      May  3 2012  4:10PM     

 

                    B12 deficiency      2012  2:54PM     

 

                    B12 deficiency      2012 11:23AM     

 

                    B12 deficiency      Aug  9 2012  2:08PM     

 

                    B12 deficiency      Sep  6 2012  4:36PM     

 

                    B12 deficiency      Oct 16 2012 10:23AM     

 

                    Flu                 Feb  4   3:11PM     

 

                    Bipolar disorder, unspecified    Feb  2013  2:48PM     

 

                    Anemia, Pernicious    Feb  2013  2:48PM     

 

                    B12 deficiency      Feb  4   2:48PM     

 

                    Extrapyramidal abnormal movement disorder    Feb  4   2:48PM     

 

                    B12 deficiency      Apr  3 2013 12:03PM     

 

                    Bipolar disorder, unspecified    May  7 2013  1:31PM     

 

                    Anemia, Pernicious    May  7 2013  1:31PM     

 

                    B12 deficiency      May  7 2013  1:31PM     

 

                    Extrapyramidal abnormal movement disorder    May  7 2013  1:31PM     

 

                    B12 deficiency      2013  3:42PM     

 

                    B12 deficiency      2013  1:31PM     

 

                    Hyperlipidemia      Aug  7 2013 10:37AM     

 

                    Vitamin D Deficiency    Aug  7 2013 10:37AM     

 

                    Bipolar disorder, unspecified    Aug  7 2013 10:37AM     

 

                    Anemia, Pernicious    Aug  7 2013 10:37AM     

 

                    B12 deficiency      Aug  7 2013 10:37AM     

 

                    B12 deficiency      Sep 25 2013 11:15AM     

 

                    B12 deficiency      Dec 11 2013  3:16PM     

 

                    B12 deficiency      Mar  6 2014  1:48PM     

 

                    B12 deficiency      May 21 2014  3:17PM     

 

                    Screening Examination for Breast Cancer    2014  3:23PM     

 

                    Periumbilical abdominal pain    2014  3:23PM     

 

                    B12 deficiency      Jul 10 2014  2:52PM     

 

                    Anemia, Vitamin B12 Deficiency    Aug 13 2014  4:50PM     

 

                    Bipolar disorder    Oct 16 2014 11:13AM     

 

                    Hyperlipidemia      Oct 16 2014 11:13AM     

 

                    Anemia, Pernicious    Oct 16 2014 11:13AM     

 

                    Peritoneal Neoplasm, Malignant    Oct 16 2014 11:13AM     

 

                    Screening breast examination    Oct 16 2014 11:13AM     

 

                    Weight loss         Oct 16 2014 11:13AM     

 

                    Anemia, Pernicious    Mar 23 2015  2:57PM     

 

                    B12 deficiency      Mar 23 2015  2:57PM     

 

                    Need for Prevnar vaccine    Mar 23 2015  2:57PM     

 

                    Bipolar disorder    Mar 23 2015  2:57PM     

 

                    Hyperlipidemia      Mar 23 2015  2:57PM     

 

                    Anemia, Pernicious    Mar 23 2015  2:57PM     

 

                    Peritoneal Neoplasm, Malignant    Mar 23 2015  2:57PM     

 

                    B12 deficiency      May  4 2015  4:48PM     

 

                    Hyperlipidemia      May 13 2015  9:56AM     

 

                    Anemia              May 13 2015  9:56AM     

 

                    Bipolar disorder    May 13 2015  9:56AM     

 

                    Bipolar disorder    May 14 2015  3:27PM     

 

                    Hyperlipidemia      May 14 2015  3:27PM     

 

                    Anemia, Pernicious    May 14 2015  3:27PM     

 

                    Peritoneal Neoplasm, Malignant    May 14 2015  3:27PM     

 

                    B12 deficiency      2015  2:20PM     

 

                    B12 deficiency      2015 11:34AM     

 

                    B12 deficiency      Aug 18 2015  9:06AM     

 

                    Tinea Corporis      Sep 18 2015  8:54AM     

 

                    B12 deficiency      Sep 18 2015  8:54AM     

 

                    B12 deficiency      2015 10:28AM     

 

                    Herpes zoster without complication    Dec  3 2015  9:52AM     

 

                    B12 deficiency      Dec 23 2015 11:21AM     

 

                    B12 deficiency      2016  4:51PM     

 

                    Vitamin B 12 deficiency    Mar 14 2016  5:35PM     

 

                    B12 deficiency      Mar 15 2016 12:14PM     

 

                    B12 deficiency      May  5 2016 11:30AM     

 

                    Edema               May  5 2016 11:30AM     

 

                    Dermatitis          May  5 2016 11:30AM     

 

                    Edema               May 17 2016  8:38AM     

 

                    Shortness of breath    May 17 2016  8:38AM     

 

                    Bilateral edema of lower extremity    2016  2:06PM     

 

                    B12 deficiency      2016  2:06PM     

 

                    B12 deficiency      2016 11:50AM     

 

                    B12 deficiency      2016 11:20AM     

 

                    Diarrhea            Aug  2 2016  3:13PM     

 

                    B12 deficiency      Aug 24 2016 11:10AM     

 

                    Encounter for screening mammogram for breast cancer    Aug 24 2016 11:44AM     

 

                    B12 deficiency      Sep 28 2016  2:35PM     

 

                    B12 deficiency      Dec 15 2016  2:02PM     

 

                    Dysuria             Dec 29 2016 12:14PM     

 

                    Hematuria           Fidencio  3 2017  1:33PM     

 

                    Constipation by delayed colonic transit    2017  1:52PM     

 

                    Ileus               2017  1:52PM     

 

                    UTI (urinary tract infection)    Fidencio 15 2017  3:39PM     

 

                    Acute cystitis with hematuria    2017 11:07AM     

 

                    B12 deficiency      2017 11:07AM     

 

                    B12 deficiency      2017 11:40AM     

 

                    B12 deficiency      2017  4:07PM     

 

                    Slurred speech      2017  3:07PM     

 

                    Vitamin B12 deficiency    2017  3:07PM     

 

                    Dysphagia, unspecified type    2017  3:07PM     

 

                    Hyperlipidemia      2017  3:07PM     

 

                    Dysuria             2017 12:01PM     

 

                    B12 deficiency      2017  2:08PM     

 

                    Dysuria             2017 10:58AM     

 

                    Hematuria           May 22 2017  1:36PM     

 

                    Depressive Disorder    May 22 2017  1:36PM     

 

                    Constipation        May 22 2017  1:36PM     

 

                    Fatigue             May 22 2017  1:36PM     

 

                    Urinary tract infection    May 30 2017  9:36AM     

 

                    Hypokalemia         May 30 2017 12:03PM     

 

                    Urinary tract infection    2017  4:36PM     







Payers







           Insurance Name    Company Name    Plan Name    Plan Number    Policy Number    Policy Group

 Number                                 Start Date

 

                    Medicare [facility]    Medicare [facility]              [id number]              N/A

 

                          [employer]    [employer]                 [id number]

                                                    

 

                    Medicare Part A    Medicare - Lab/Xray              [id number]              2006

 

                    Medicare Part B    Medic[place]              [id number]              2006

 

                          [employer]    [employer]                 50 percent

                                                    2009

 

                    [facility]              [id number]              N/A

 

                    Medicare Part A    Medicare Part A              [id number]              N/A

 

                    Medicare Part A    Medicare Part A              [id number]              2006









History of Encounters







                    Visit Date          Visit Type          Provider

 

                    2017           [facility]            Pranav Angel MD

 

                    2017           Office visit        Bhupinder Aspen DO

 

                    2017           Nurse visit         Bhupinder Aspen DO

 

                    2017           Office visit         

 

                    2017           Office visit        Bhupinder Aspen DO

 

                    2017           Nurse visit         Bhupinder Aspen DO

 

                    2017           Office visit        Radha Ontiveros APRN

 

                    1/15/2017           Office visit        Aj Tapia NP

 

                    2017            Office visit        Devin Masterson MD

 

                    2016          [facility]            Devin Masterson MD

 

                    12/15/2016          Nurse visit         Bhupinder Aspen DO

 

                    2016           Nurse visit         Bret Forte APRN

 

                    2016           Nurse visit         Bhupinder Aspen DO

 

                    2016            Office visit        Bhupinder Aspen DO

 

                    2016           Nurse visit         Bhupinder Aspen DO

 

                    2016           Office visit        Bret Forte APRN

 

                    2016            Office visit        Bhupinder Aspen DO

 

                    3/15/2016           Nurse visit         Bhupinder Aspen DO

 

                    2016            Nurse visit         Bhupinder Aspen DO

 

                    2015          Nurse visit         Bhupinder Aspen DO

 

                    12/3/2015           Office visit        Bhupinder Aspen DO

 

                    2015          Nurse visit         Bhupinder Aspen DO

 

                    2015           Office visit        Bhupinder Aspen DO

 

                    2015           Nurse visit         Bhupinder Sapen DO

 

                    2015            Nurse visit         Bhupinder Aspen DO

 

                    2015            Nurse visit         Bhupinder Aspen DO

 

                    2015           Office visit        Bhupinder Aspen DO

 

                    2015            Nurse visit         Bhupinder Aspen DO

 

                    3/23/2015           Office visit        Bhupinder Aspen DO

 

                    10/16/2014          Office visit        Bhupinder Aspen DO

 

                    2014           Nurse visit         Radha Weston APRN

 

                    7/10/2014           Nurse visit         Bhupinder Aspen DO

 

                    2014           Office visit        Bhupinder Aspen DO

 

                    2014           Nurse visit         Bhupinder Aspen DO

 

                    3/6/2014            Nurse visit         Bhupinder Aspen DO

 

                    2014            [facility]            EARNEST Lopez MD

 

                    2013          Nurse visit         Bhupinder Aspen DO

 

                    2013           Nurse visit         Bhupinder Aspen DO

 

                    2013            Office visit        Bhupinder Aspen DO

 

                    2013            Nurse visit         Bhupinder Aspen DO

 

                    2013            Nurse visit         Bhupinder Aspen DO

 

                    2013            Office visit        Bhupinder Aspen DO

 

                    4/3/2013            Nurse visit         Bhupinder Aspen DO

 

                    2013            Office visit        Bhupinder Aspen DO

 

                    10/16/2012          Nurse visit         Bhupinder Aspen DO

 

                    2012            Nurse visit         Bhupinder Aspen DO

 

                    2012            Voided              Bhupinder Aspen DO

 

                    2012            Nurse visit         Bhupinder Aspen DO

 

                    2012            Nurse visit         Bhupinder Aspen DO

 

                    2012           Nurse visit         Bhupinder Aspen DO

 

                    5/3/2012            Nurse visit         Bhupinder Aspen DO

 

                    2012           Nurse visit         Bhupinder Aspen DO

 

                    2012           Nurse visit         Bhupinder Aspen DO

 

                    2012           Nurse visit         Bhupinder Aspen DO

 

                    2011           Nurse visit         Bhupinder Aspen DO

 

                    10/20/2011          Nurse visit         hBupinder Aspen DO

 

                    2011           Office visit        Bhupinder Aspen DO

 

                    2011           Nurse visit         Radha Ontiveros APRN

 

                    2011            Office visit        Buhpinder Aspen DO

 

                    2011           Nurse visit         Bhupinder Aspen DO

 

                    2011            Office visit        Bhupinder Aspen DO

 

                    2011           Office visit        Bhupinder Aspen DO

 

                    5/10/2011           Office visit        Bhupinder Aspen DO

 

                    2011           Office visit        Bhupinder Aspen DO

 

                    4/15/2011           Office visit        Devin Angel DO

 

                    2011           Office visit        Devin Angel DO

 

                    10/15/2010          Office visit        Devin Angel DO

 

                    2010           Office visit        Devin Angel DO

 

                    2010            Office visit        Devin Angel DO

 

                    2010           Office visit        Devin Angel DO

 

                    2010            Office visit        Devin Angel DO

 

                    3/8/2010            Office visit        Devin Masterson MD

 

                    2010            Surgery             Devin Masterson MD

 

                    2010            Office visit        Devin Angel DO

 

                    2010           Surgery             Devin Masterson MD

 

                    2010           [facility]            Devin Masterson MD

 

                    2010           [facility]            Devin Masterson MD

 

                    10/22/2009          Office visit        Devin Angel DO

## 2019-06-26 NOTE — XMS REPORT
MU2 Ambulatory Summary

                             Created on: 05/15/2016



Pauline Gan

External Reference #: [id number]

: 1950

Sex: Female



Demographics







                          Address                   [address]

[place]  [id number]

 

                          Home Phone                [phone]

 

                          Preferred Language        English

 

                          Marital Status            Legally 

 

                          Anglican Affiliation     Unknown

 

                          Race                      White

 

                          Ethnic Group              Not  or 





Author







                          Author                    Bhupinder Louise

 

                          [facility]

 

                          Address                   [address]

[place]  [phone]



 

                          Phone                     [phone]







Care Team Providers







                    Care Team Member Name    Role                Phone

 

                    Bhupinder Louise    PCP                 Unavailable







Allergies and Adverse Reactions







                    Name                Reaction            Notes

 

                    NO KNOWN DRUG ALLERGIES                         







Plan of Treatment







             Planned Activity    Comments     Planned Date    Planned Time    Plan/Goal

 

             COMPLETE CBC W/AUTO DIFF WBC                 2013     12:00 AM      

 

             ASSAY OF LITHIUM                 2013     12:00 AM      

 

             METABOLIC PANEL TOTAL CA                 2013     12:00 AM      

 

             VITAMIN B-12                 2013     12:00 AM      

 

             ASSAY OF FOLIC ACID SERUM                 2013     12:00 AM      

 

             THER/PROPH/DIAG INJ SC/IM                 2013    12:00 AM      







Medications







                                        Active 

 

             Name         Start Date    Estimated Completion Date    SIG          Comments

 

                Latuda 20 mg oral tablet                                    take 1 tablet (20 mg) by oral route once daily with

 food (at least 350 calories)            

 

             pravastatin 40 mg oral tablet    3/30/2015                 TAKE 1 TABLET BY MOUTH DAILY     

 

                Namenda XR 28 mg oral capsule,sprinkle,ER 24hr    2015                       take 1 capsule (28

 mg) by oral route once daily            

 

                Namenda XR 28 mg oral capsule,sprinkle,ER 24hr    2016                       take 1 capsule (28

 mg) by oral route once daily            

 

                triamcinolone acetonide 0.1 % topical cream    2016                        apply a thin layer to 

the affected area(s) by topical route 2 times per day     

 

                furosemide 20 mg oral tablet    2016       take 1 tablet (20 mg) by oral

 route once daily for 30 days            









                                         

 

             Name         Start Date    Expiration Date    SIG          Comments

 

             Reglan 10mg    3/29/2010    2010    one ac and hs     

 

                Keflex 500 mg oral capsule    2010       10/1/2010       take 1 capsule (500 mg) by oral

 route every 6 hours for 10 days         

 

                Bactrim -160 mg oral tablet    2011       take 1 tablet by oral route

 every 12 hours for 7 days               

 

                triamcinolone acetonide 0.1 % topical cream    2011      apply a thin

 layer to the affected area(s) by topical route 2 times per day     

 

                sertraline 100 mg oral tablet    4/10/2012       5/10/2012       take 1.5 tablets by oral route

 daily for 30 days                       

 

                ergocalciferol (vitamin D2) 50,000 unit oral capsule    4/15/2013       2013       TAKE

 ONE CAPSULE BY MOUTH ONCE A WEEK        

 

                CYANOCOBALAM 1000MCGINJ 1000 milliliter    2013       INJECT 1ML INTRAMUSCULAR

 ONCE A MONTH                            

 

                pravastatin 40 mg oral tablet    3/25/2014       3/20/2015       TAKE ONE TABLET BY MOUTH EVERY

 DAY                                     

 

                          Zostavax (PF) 19,400 unit/0.65 mL subcutaneous suspension for reconstitution    3/23/2015

                    3/24/2015           inject 0.65 milliliter by subcutaneous route once     

 

                lithium carbonate 300 mg oral capsule    2015       take 1 capsule (300

 mg) by oral route 2 for 30 days         

 

                famciclovir 500 mg oral tablet    12/3/2015       12/10/2015      take 1 tablet (500 mg) by

 oral route every 8 hours for 7 days     









                                        Discontinued 

 

             Name         Start Date    Discontinued Date    SIG          Comments

 

                Tylenol 325 mg oral tablet                    2013        take 1 - 2 tablets (325 -650 mg) by oral

 route every 4-6 hours as needed         

 

                Calcium 600 + D(3) 600 mg(1,500mg) -400 unit oral tablet                    2011       take 1 tablet

 by oral route 2 times a day            no longer taking

 

                Vitamin B-12 1,000 mcg oral tablet extended release    2010       take 1

 tablet by oral route daily             no longer taking

 

                Antifungal (clotrimazole) 1 % topical cream    2010       apply to the 

affected and surrounding areas of skin by topical route 2 times per day morning 
and evening                              

 

                sertraline 100 mg oral tablet    5/10/2011       2011       take 2 tablets (200 mg) by 

oral route once daily                   discontinued by Dr. Serrano

 

                mirtazapine 15 mg oral tablet                    2011        take 1 tablet (15 mg) by oral route 

once daily before bedtime               Dr. Serrano

 

                mirtazapine 15 mg oral tablet                    2011        take 1 tablet (15 mg) by oral route 

once daily before bedtime               dc'd by Dr. Serrano

 

                Pristiq 50 mg oral tablet extended release 24 hr                    2013        take 1 tablet (50

 mg) by oral route once daily           Dr. Serrano

 

                Pristiq 50 mg oral tablet extended release 24 hr                    2013        take 1 tablet (50

 mg) by oral route once daily           dose updated

 

                Vitamin B-12 1,000 mcg/mL injection solution    2011        inject 1 milliliter

 (1,000 mcg) by intramuscular route once a month    on list already

 

                    syringe with needle (disp) 1 mL 25 gauge x 1" miscellaneous syringe    2011

                          use for injection once a month     

 

                clotrimazole 1 % topical cream    2011        apply to the affected and surrounding

 areas of skin by topical route 2 times per day in the morning and evening     

 

                Vitamin D2 50,000 unit oral capsule    2011        take 1 capsule (50,000

 unit) by oral route once weekly        generic on list

 

                Pravachol 40 mg oral tablet    2012        take 1 tablet (40 mg) by oral 

route once daily for 90 days            generic on list

 

                lithium carbonate 300 mg oral capsule    2012        take 1 capsule by oral

 route daily                            dose updated

 

                Pristiq 100 mg oral tablet extended release 24 hr                    4/10/2012       take 1 and 1/2 

tablet (150 mg) by oral route once daily    Mental Health provider

 

                Pristiq 100 mg oral tablet extended release 24 hr                    4/10/2012       take 1 and 1/2 

tablet (150 mg) by oral route once daily    Discontinued by Dr Efrain [facility] at [facility]

 

                hydroxyzine HCl 50 mg oral tablet    10/16/2014      2015       take 1 tablet (50 mg) 

by oral route at bedtime                 

 

                fluconazole 100 mg oral tablet    2015       12/3/2015       take 1 tablet (100 mg) by 

oral route once a week                   

 

                ketoconazole 2 % topical cream    2015       12/3/2015       apply to the affected area(s)

 by topical route 2 times per day        

 

                prednisone 10 mg oral tablet    12/3/2015       2016        take 2 tablets (20 mg) by oral

 route once daily for 4 days 1 tablet daily for 4 days 0.5 tablet daily for 4 
days                                     







Problem List







                    Description         Status              Onset

 

                    Artificial opening status; colostomy    Active               

 

                    Bipolar disorder, unspecified    Active               

 

                    Hyperlipidemia      Active               

 

                    Peritoneal Neoplasm, Malignant    Active               

 

                    Anemia, Pernicious    Active               

 

                    Arthritis unspecified    Active               

 

                    B12 deficiency      Active               







Vital Signs







      Date    Time    BP-Sys(mm[Hg]    BP-Lynn(mm[Hg])    HR(bpm)    RR(rpm)    Temp    WT    HT    HC    BMI

                    BSA                 BMI Percentile      O2 Sat(%)

 

        2016    11:27:00 AM    148 mmHg    78 mmHg    90 bpm    20 rpm    98.2 F    153 lbs    69 in

                          22.59 kg/m2    1.84 m2                   96 %

 

        12/3/2015    9:50:00 AM    132 mmHg    70 mmHg    62 bpm    16 rpm    97.9 F    145 lbs    69 in

                          21.4125 kg/m    1.7894 m                 100 %

 

        2015    8:52:00 AM    132 mmHg    68 mmHg    52 bpm    20 rpm    97.8 F    141 lbs    69 in

                          20.82 kg/m2    1.76 m2                   100 %

 

        2015    3:25:00 PM    120 mmHg    62 mmHg    72 bpm    16 rpm    98.1 F    136 lbs    69 in

                          20.0835 kg/m    1.733 m                 98 %

 

       3/23/2015    2:55:00 PM    130 mmHg    76 mmHg    68 bpm    18 rpm    97 F    140 lbs    69 in    

                20.67 kg/m2     1.76 m2                         98 %

 

        10/16/2014    11:11:00 AM    120 mmHg    66 mmHg    77 bpm    20 rpm    98 F    130 lbs    69 in

                          19.1974 kg/m    1.6943 m                 100 %

 

        2014    3:21:00 PM    130 mmHg    66 mmHg    63 bpm    18 rpm    97.2 F    160 lbs    69 in

                          23.63 kg/m2    1.88 m2                   99 %

 

        2013    10:35:00 AM    132 mmHg    70 mmHg    66 bpm    20 rpm    98.1 F    157 lbs    69 in

                          23.1846 kg/m    1.862 m                  

 

        2013    1:29:00 PM    132 mmHg    70 mmHg    76 bpm    18 rpm    98.2 F    166 lbs    69 in 

                          24.51 kg/m2    1.91 m2                    

 

       2013    2:46:00 PM    128 mmHg    70 mmHg    76 bpm    16 rpm    98 F    160 lbs    69 in     

                23.6276 kg/m    1.8797 m                       

 

        2011    8:49:00 AM    128 mmHg    78 mmHg    70 bpm    18 rpm    97.9 F    164 lbs    69 in

                          24.22 kg/m2    1.90 m2                    

 

     2011    1:31:00 PM    132 mmHg    68 mmHg    84 bpm         97 F    167 lbs                        

                                         

 

        2011    9:09:00 AM    128 mmHg    70 mmHg    72 bpm    18 rpm    98.2 F    163 lbs    64 in 

                          27.98 kg/m2    1.83 m2                    

 

       2011    10:01:00 AM    132 mmHg    70 mmHg    72 bpm    18 rpm    98.2 F    154 lbs             

                                                                 

 

       2011    2:47:00 PM    128 mmHg    70 mmHg    72 bpm    18 rpm    97.8 F    156 lbs             

                                                                 

 

       5/10/2011    3:16:00 PM    144 mmHg    80 mmHg    72 bpm    18 rpm    98.2 F    158 lbs             

                                                                 

 

        2011    10:11:00 AM    132 mmHg    70 mmHg    70 bpm    18 rpm    98.2 F    168 lbs    69 in

                          24.81 kg/m2    1.93 m2                    

 

        4/15/2011    10:52:00 AM    110 mmHg    60 mmHg    75 bpm    16 rpm    97.5 F    172.375 lbs    

69 in                     25.4551 kg/m    1.951 m                 100 %

 

        2011    11:43:00 AM    120 mmHg    82 mmHg    75 bpm    16 rpm    97.2 F    178.5 lbs    69

 in                       26.36 kg/m2    1.99 m2                   100 %

 

        10/15/2010    1:32:00 PM    120 mmHg    70 mmHg    80 bpm    18 rpm    96.6 F    177 lbs    69 in

                          26.1381 kg/m    1.977 m                 100 %

 

        2010    3:50:00 PM    168 mmHg    100 mmHg    82 bpm    18 rpm    97.8 F    177.5 lbs    69

 in                       26.21 kg/m2    1.98 m2                   97 %

 

        2010    1:21:00 PM    140 mmHg    80 mmHg    59 bpm    16 rpm    97.6 F    173.25 lbs    69 

in                        25.5843 kg/m    1.956 m                 100 %

 

        2010    3:02:00 PM    140 mmHg    80 mmHg    61 bpm    16 rpm    97.6 F    173.125 lbs    69

 in                       25.57 kg/m2    1.96 m2                   99 %

 

        2010    1:23:00 PM    130 mmHg    80 mmHg    66 bpm    16 rpm    96.8 F    173 lbs    69 in 

                          25.5474 kg/m    1.9546 m                 100 %

 

        2010    12:58:00 PM    130 mmHg    88 mmHg    75 bpm    16 rpm    98.4 F    172.25 lbs    69

 in                       25.44 kg/m2    1.95 m2                   100 %







Social History







                    Name                Description         Comments

 

                    denies alcohol use                         

 

                    denies smoking                           

 

                    Denies illicit substance abuse                         

 

                    retired                                 direct care

 

                    Single                                   

 

                    Exercises regularly                         

 

                    Attended some college                         

 

                    Tobacco             Never smoker         







History of Procedures







                    Date Ordered        Description         Order Status

 

                    2010 12:00 AM    COMPREHEN METABOLIC PANEL    Reviewed

 

                    2010 12:00 AM    COMPLETE CBC W/AUTO DIFF WBC    Reviewed

 

                    2010 12:00 AM    LIPID PANEL         Reviewed

 

                          2015 12:00 AM        B12 Injection, Up to 1000 Mcg NDC#8869-7422-92 [facility] 

                                        Reviewed

 

                    2011 12:00 AM    MAMMOGRAM SCREENING    Reviewed

 

                    2011 12:00 AM    CYTOPATH C/V THIN LAYER    Reviewed

 

                    2011 12:00 AM    B12 Injection 1 cc NDC#[address]-36    Reviewed

 

                    2015 12:00 AM    THER/PROPH/DIAG INJ SC/IM    Reviewed

 

                    2015 12:00 AM    B12 Injection, Up to 1000 Mcg NDC#6584-8779-23    Reviewed

 

                    2011 12:00 AM    THER/PROPH/DIAG INJ SC/IM    Reviewed

 

                    2011 12:00 AM    B12 Injection([place]) Ndc#2109-3496-61-    Reviewed

 

                    2015 12:00 AM    THER/PROPH/DIAG INJ SC/IM    Reviewed

 

                    2015 12:00 AM    B12 Injection, Up to 1000 Mcg NDC#9247-8506-33    Reviewed

 

                    10/20/2011 12:00 AM    THER/PROPH/DIAG INJ SC/IM    Reviewed

 

                    10/20/2011 12:00 AM    B12 Injection([place]) Ndc#0775-3168-71-    Reviewed

 

                    2016 12:00 AM    THER/PROPH/DIAG INJ SC/IM    Reviewed

 

                    2016 12:00 AM    B12 Injection, Up to 1000 Mcg NDC#7006-4958-39    Reviewed

 

                    3/14/2016 12:00 AM    VITAMIN B-12        Reviewed

 

                    3/15/2016 12:00 AM    THER/PROPH/DIAG INJ SC/IM    Reviewed

 

                    3/15/2016 12:00 AM    B12 Injection, Up to 1000 Mcg NDC#7401-0539-53    Reviewed

 

                    2011 12:00 AM    ***Immunization administration, Medicare flu    Reviewed

 

                    2011 12:00 AM    Fluzone ** MEDICARE Only **    Reviewed

 

                    2011 12:00 AM    THER/PROPH/DIAG INJ SC/IM    Reviewed

 

                    2011 12:00 AM    B12 Injection ([facility]) Ndc#9741-8686-82    Reviewed

 

                    2016 12:00 AM    B12 Injection, Up to 1000 Mcg NDC#5225-1970-25 [facility] Medicare    

Reviewed

 

                    2012 12:00 AM    THER/PROPH/DIAG INJ SC/IM    Reviewed

 

                    2012 12:00 AM    B12 Injection ([employer] [facility]) Ndc#1117-7171-10    Reviewed

 

                    2012 12:00 AM    THER/PROPH/DIAG INJ SC/IM    Reviewed

 

                    2012 12:00 AM    B12 Injection([place]) Ndc#[address]-260[address]-90-    Reviewed

 

                    5/3/2012 12:00 AM    THER/PROPH/DIAG INJ SC/IM    Reviewed

 

                    5/3/2012 12:00 AM    B12 Injection([place]) Ndc#2578-5646-08-    Reviewed

 

                    2012 12:00 AM    IMMUNOTHERAPY INJECTIONS    Reviewed

 

                    2012 12:00 AM    B12 Injection([place]) Ndc#3985-6751-84-    Reviewed

 

                    2012 12:00 AM    THER/PROPH/DIAG INJ SC/IM    Reviewed

 

                    2012 12:00 AM    B12 Injection, Up to 1000 Mcg NDC#4892-7079-15    Reviewed

 

                    2012 12:00 AM    THER/PROPH/DIAG INJ SC/IM    Reviewed

 

                    2012 12:00 AM    B12 Injection, Up to 1000 Mcg NDC#8358-4211-55    Reviewed

 

                    2012 12:00 AM    THER/PROPH/DIAG INJ SC/IM    Reviewed

 

                    2012 12:00 AM    B12 Injection, Up to 1000 Mcg NDC#8512-2779-85    Reviewed

 

                    10/16/2012 12:00 AM    THER/PROPH/DIAG INJ SC/IM    Reviewed

 

                    10/16/2012 12:00 AM    B12 Injection, Up to 1000 Mcg NDC#2225-4179-13    Reviewed

 

                    2010 12:00 AM    COMPREHEN METABOLIC PANEL    Reviewed

 

                    2010 12:00 AM    COMPLETE CBC W/AUTO DIFF WBC    Reviewed

 

                    2010 12:00 AM    LIPID PANEL         Reviewed

 

                    2013 12:00 AM    Flu Injection 3 Years And Above NDC# [address]-40  [facility]    Reviewed



 

                    2013 12:00 AM    COMPLETE CBC W/AUTO DIFF WBC    Reviewed

 

                    2013 12:00 AM    ASSAY OF LITHIUM    Reviewed

 

                    2013 12:00 AM    METABOLIC PANEL TOTAL CA    Reviewed

 

                    4/3/2013 12:00 AM    THER/PROPH/DIAG INJ SC/IM    Reviewed

 

                    4/3/2013 12:00 AM    B12 Injection, Up to 1000 Mcg NDC#8574-2940-71    Reviewed

 

                    2013 12:00 AM    THER/PROPH/DIAG INJ SC/IM    Reviewed

 

                    2013 12:00 AM    B12 Injection, Up to 1000 Mcg NDC#1014-2019-52    Reviewed

 

                    2013 12:00 AM    THER/PROPH/DIAG INJ SC/IM    Reviewed

 

                    2013 12:00 AM    B12 Injection, Up to 1000 Mcg NDC#4226-2500-57    Reviewed

 

                    2013 12:00 AM    LIPID PANEL         Reviewed

 

                    2013 12:00 AM    VITAMIN D 25 HYDROXY    Reviewed

 

                    2013 12:00 AM    THER/PROPH/DIAG INJ SC/IM    Reviewed

 

                    3/6/2014 12:00 AM    THER/PROPH/DIAG INJ SC/IM    Reviewed

 

                    2014 12:00 AM    THER/PROPH/DIAG INJ SC/IM    Reviewed

 

                    2014 12:00 AM    B12 Injection, Up to 1000 Mcg NDC#7264-5429-47    Reviewed

 

                    2010 12:00 AM    SKIN FUNGI CULTURE    Reviewed

 

                    10/9/2010 12:00 AM    COMPREHEN METABOLIC PANEL    Reviewed

 

                    10/9/2010 12:00 AM    LIPID PANEL         Reviewed

 

                    2010 12:00 AM    THER/PROPH/DIAG INJ SC/IM    Reviewed

 

                    2010 12:00 AM    B12 Injection Ndc#[address]-65 ([place])    Reviewed

 

                    2010 12:00 AM    THER/PROPH/DIAG INJ SC/IM    Reviewed

 

                    2010 12:00 AM    Kenalog 40 Mg Im-Ndc#[address]-15 ([place])    Reviewed

 

                    10/15/2010 12:00 AM    FLU VACCINE 3 YRS & > IM    Reviewed

 

                    10/15/2010 12:00 AM    Admin.Of M/C Cov.Vaccine-Flu Vacc.    Reviewed

 

                    1/15/2011 12:00 AM    COMPLETE CBC W/AUTO DIFF WBC    Reviewed

 

                    1/15/2011 12:00 AM    COMPREHEN METABOLIC PANEL    Reviewed

 

                    1/15/2011 12:00 AM    LIPID PANEL         Reviewed

 

                    2014 12:00 AM    MAMMOGRAM SCREENING    Reviewed

 

                    2014 12:00 AM    Screening mammography, bilateral    Reviewed

 

                    7/10/2014 12:00 AM    THER/PROPH/DIAG INJ SC/IM    Reviewed

 

                    7/10/2014 12:00 AM    B12 Injection, Up to 1000 Mcg NDC#4401-7610-32    Reviewed

 

                    2011 12:00 AM    COMPLETE CBC W/AUTO DIFF WBC    Reviewed

 

                    2011 12:00 AM    COMPREHEN METABOLIC PANEL    Reviewed

 

                    2011 12:00 AM    LIPID PANEL         Reviewed

 

                    2014 12:00 AM    B12 Injection, Up to 1000 Mcg NDC#9394-8327-15    Reviewed

 

                    10/19/2014 12:00 AM    MAMMOGRAM SCREENING    Reviewed

 

                    10/19/2014 12:00 AM    Screening mammography, bilateral    Reviewed

 

                    10/16/2014 12:00 AM    COMPLETE CBC W/AUTO DIFF WBC    Reviewed

 

                    10/16/2014 12:00 AM    COMPREHEN METABOLIC PANEL    Reviewed

 

                    10/16/2014 12:00 AM    IMMUNOASSAY TUMOR     Reviewed

 

                    10/16/2014 12:00 AM    LIPID PANEL         Reviewed

 

                    10/16/2014 12:00 AM    ASSAY OF LITHIUM    Reviewed

 

                    10/16/2014 12:00 AM    MAMMOGRAM SCREENING    Reviewed

 

                    2011 12:00 AM    ASSAY OF PARATHORMONE    Reviewed

 

                    2011 12:00 AM    VITAMIN D 25 HYDROXY    Reviewed

 

                    2011 12:00 AM    ASSAY OF LITHIUM    Reviewed

 

                    2011 12:00 AM    METABOLIC PANEL TOTAL CA    Reviewed

 

                    2011 12:00 AM    CT HEAD/BRAIN W/O & W/DYE    Reviewed

 

                    3/23/2015 12:00 AM    PNEUMOCOCCAL VACC 13 GLENDY IM    Reviewed

 

                    3/23/2015 12:00 AM    Vitamin B12 injection    Reviewed

 

                    2011 12:00 AM    ASSAY OF LITHIUM    Reviewed

 

                    2011 12:00 AM    B12 Injection Ndc#[address]-67  [facility]    Reviewed

 

                    2015 12:00 AM    THER/PROPH/DIAG INJ SC/IM    Reviewed

 

                    2015 12:00 AM    B12 Injection, Up to 1000 Mcg NDC#2411-8798-53    Reviewed

 

                    2015 12:00 AM    COMPLETE CBC W/AUTO DIFF WBC    Reviewed

 

                    2015 12:00 AM    COMPREHEN METABOLIC PANEL    Reviewed

 

                    2015 12:00 AM    LIPID PANEL         Reviewed

 

                    2015 12:00 AM    ASSAY OF LITHIUM    Reviewed

 

                    2011 12:00 AM    VIT D 1 25-DIHYDROXY    Reviewed

 

                    2011 12:00 AM    VITAMIN B-12        Reviewed

 

                    2015 12:00 AM    B12 Injection, Up to 1000 Mcg NDC#6452-8049-35    Reviewed

 

                    2015 12:00 AM    THER/PROPH/DIAG INJ SC/IM    Reviewed

 

                    2015 12:00 AM    B12 Injection, Up to 1000 Mcg NDC#3338-6728-33    Reviewed

 

                    2011 12:00 AM    THER/PROPH/DIAG INJ SC/IM    Reviewed

 

                    2011 12:00 AM    B12 Injection ([employer] [facility]) Ndc#8897-5666-78    Reviewed

 

                    2015 12:00 AM    THER/PROPH/DIAG INJ SC/IM    Reviewed

 

                    2015 12:00 AM    B12 Injection, Up to 1000 Mcg NDC#[id number]    Reviewed







Results Summary







                          Data and Description      Results

 

                          2004 12:00 AM        Colonoscopy-Women and Men over 50 Normal 

 

                          2008 12:00 AM         Pap Smear Declined 

 

                          10/7/2009 12:00 AM        Cholest Cry Stone Ql .0 %LDLc SerPl-mCnc 123.0 mg/dLHDLc

 SerPl-mCnc 34.0 mg/dLTrigl SerPl-mCnc 190.0 mg/dLGlucose SerPl-mCnc 78.0 mg/dL

 

                          2009 12:00 AM        Mammogram -Women over 40 Normal HIV1+2 Ab Ser Ql no risk 

 

                          2010 8:47 AM         Dexa Bone Scan Refused Aspirin reccommended Contraindication 



 

                          2010 8:48 AM         Depression Done 

 

                          2010 12:00 AM         Foot Exam-Diabetic Done 

 

                          2010 12:00 AM         Cholest Cry Stone Ql .0 %LDLc SerPl-mCnc 126.0 mg/dLGlucose

 SerPl-mCnc 102.0 mg/dL

 

                          2010 8:45 AM          TRIGLYCERIDES 122.0 mg/dLCHOLESTEROL 186.0 mg/dLHDL 36.0 mg/dLLDL

 (CALC) 126.0 mg/dLGLUCOSE 102.0 mg/dLSODIUM 143.0 mmol/LPOTASSIUM 3.70 
mmol/LCHLORIDE 111.0 mmol/LCO2 23.0 mmol/LBUN 10.0 mg/dLCREATININE 0.80 
mg/dLSGOT/AST 12.0 IU/LSGPT/ALT 11.0 IU/LALK PHOS 65.0 IU/LTOTAL PROTEIN 7.20 
g/dLALBUMIN 3.90 g/dLTOTAL BILI 0.50 mg/dLCALCIUM 10.20 mg/dLeGFR >60 
mL/min/1.73 m2WBC 5.7 RBC 3.26 HGB 10.60 g/dLHCT 31.70 %MCV 97.0 fLMCH 32.50 
pgMCHC 33.40 g/dLRDW CV 13.30 %MPV 9.70 fLPLT 287 %NEUT 62.90 %%LYMP 21.80 
%%MONO 9.90 %%EOS 5.0 %%BASO 0.40 %#NEUT 3.56 #LYMP 1.23 #MONO 0.56 #EOS 0.28 
#BASO 0.02 

 

                          2010 12:00 AM        Glucose SerPl-mCnc 96.0 mg/dLCholest Cry Stone Ql .0 %LDLc

 SerPl-mCnc 146.0 mg/dL

 

                          2010 8:26 AM         TRIGLYCERIDES 106.0 mg/dLCHOLESTEROL 199.0 mg/dLHDL 32.0 mg/dLLDL

 (CALC) 146.0 mg/dLGLUCOSE 96.0 mg/dLSODIUM 143.0 mmol/LPOTASSIUM 4.0 
mmol/LCHLORIDE 113.0 mmol/LCO2 24.0 mmol/LBUN 13.0 mg/dLCREATININE 1.0 
mg/dLSGOT/AST 11.0 IU/LSGPT/ALT 6.0 IU/LALK PHOS 56.0 IU/LTOTAL PROTEIN 6.60 
g/dLALBUMIN 3.80 g/dLTOTAL BILI 0.50 mg/dLCALCIUM 9.30 mg/dLeGFR 57 

 

                          10/6/2010 12:00 AM        Cholest Cry Stone Ql .0 %LDLc SerPl-mCnc 111.0 mg/dLGlucose

 SerPl-mCnc 81.0 mg/dL

 

                          10/6/2010 2:45 PM         TRIGLYCERIDES 123.0 mg/dLCHOLESTEROL 178.0 mg/dLHDL 42.0 mg/dLLDL

 (CALC) 111.0 mg/dLGLUCOSE 81.0 mg/dLSODIUM 139.0 mmol/LPOTASSIUM 4.10 
mmol/LCHLORIDE 106.0 mmol/LCO2 24.0 mmol/LBUN 13.0 mg/dLCREATININE 0.90 
mg/dLSGOT/AST 13.0 IU/LSGPT/ALT 11.0 IU/LALK PHOS 61.0 IU/LTOTAL PROTEIN 7.10 
g/dLALBUMIN 3.90 g/dLTOTAL BILI 0.30 mg/dLCALCIUM 9.30 mg/dLeGFR >60 mL/min/1.73
 m2WBC 6.9 RBC 3.59 HGB 11.50 g/dLHCT 35.30 %MCV 98.0 fLMCH 32.0 pgMCHC 32.60 
g/dLRDW CV 12.90 %MPV 9.90 fLPLT 311 %NEUT 64.90 %%LYMP 22.50 %%MONO 7.20 %%EOS 
5.10 %%BASO 0.30 %#NEUT 4.45 #LYMP 1.54 #MONO 0.49 #EOS 0.35 #BASO 0.02 

 

                          2011 12:00 AM         Mammogram -Women over 40 Ordered 

 

                          2011 10:25 AM        TRIGLYCERIDES 111.0 mg/dLCHOLESTEROL 195.0 mg/dLHDL 43.0 mg/dLLDL

 (CALC) 130.0 mg/dLWBC 5.3 RBC 3.76 HGB 12.0 g/dLHCT 37.80 %.0 fLMCH 
31.90 pgMCHC 31.70 g/dLRDW CV 13.0 %MPV 9.70 fLPLT 259 %NEUT 69.0 %%LYMP 17.60 
%%MONO 8.30 %%EOS 4.70 %%BASO 0.40 %#NEUT 3.63 #LYMP 0.93 #MONO 0.44 #EOS 0.25 
#BASO 0.02 GLUCOSE 102.0 mg/dLSODIUM 146.0 mmol/LPOTASSIUM 4.20 mmol/LCHLORIDE 
113.0 mmol/LCO2 23.0 mmol/LBUN 15.0 mg/dLCREATININE 1.0 mg/dLSGOT/AST 12.0 
IU/LSGPT/ALT 17.0 IU/LALK PHOS 60.0 IU/LTOTAL PROTEIN 6.90 g/dLALBUMIN 4.20 
g/dLTOTAL BILI 0.40 mg/dLCALCIUM 9.70 mg/dLeGFR 57 

 

                          2011 11:49 AM        Cholest Cry Stone Ql .0 %LDLc SerPl-mCnc 130.0 mg/dLHDLc

 SerPl-mCnc 43.0 mg/dLTrigl SerPl-mCnc 111.0 mg/dLGlucose SerPl-mCnc 102.0 mg/dL

 

                          2011 11:52 AM        Pap Smear Declined 

 

                          2011 11:28 AM        Lithium 2.080 mmol/LGLUCOSE 102.0 mg/dLSODIUM 135.0 mmol/LPOTASSIUM

 3.90 mmol/LCHLORIDE 106.0 mmol/LCO2 21.0 mmol/LBUN 12.0 mg/dLCREATININE 1.30 
mg/dLCALCIUM 10.70 mg/dLeGFR 42 

 

                          2011 8:58 AM          Lithium 0.690 mmol/L

 

                          2011 2:38 PM         VITAMIN B12 3483.0 pg/mL

 

                          2013 3:35 PM          WBC 5.1 RBC 3.73 HGB 11.70 g/dLHCT 36.40 %MCV 98.0 fLMCH 31.40

 pgMCHC 32.10 g/dLRDW CV 13.10 %MPV 9.80 fLPLT 224 %NEUT 66.80 %%LYMP 19.10 
%%MONO 9.0 %%EOS 4.90 %%BASO 0.20 %#NEUT 3.42 #LYMP 0.98 #MONO 0.46 #EOS 0.25 
#BASO 0.01 GLUCOSE 88.0 mg/dLSODIUM 141.0 mmol/LPOTASSIUM 4.10 mmol/LCHLORIDE 
110.0 mmol/LCO2 22.0 mmol/LBUN 22.0 mg/dLCREATININE 1.10 mg/dLCALCIUM 9.80 
mg/dLeGFR 50 Lithium 0.760 mmol/L

 

                          2013 11:02 AM        TRIGLYCERIDES 106.0 mg/dLCHOLESTEROL 181.0 mg/dLHDL 46.0 mg/dLLDL

 (CALC) 114.0 mg/dLVITAMIN D 41.10 ng/mL

 

                          10/17/2014 10:10 AM       WBC 5.0 RBC 3.66 HGB 11.60 g/dLHCT 36.80 %.0 fLMCH 31.70

 pgMCHC 31.50 g/dLRDW CV 13.50 %MPV 10.10 fLPLT 209 %NEUT 69.20 %%LYMP 21.0 
%%MONO 6.40 %%EOS 3.20 %%BASO 0.20 %#NEUT 3.46 #LYMP 1.05 #MONO 0.32 #EOS 0.16 
#BASO 0.01 GLUCOSE 100.0 mg/dLSODIUM 148.0 mmol/LPOTASSIUM 3.90 mmol/LCHLORIDE 
114.0 mmol/LCO2 26.0 mmol/LBUN 12.0 mg/dLCREATININE 1.20 mg/dLSGOT/AST 9.0 
IU/LSGPT/ALT <6 IU/LALK PHOS 82.0 IU/LTOTAL PROTEIN 6.90 g/dLALBUMIN 4.0 
g/dLTOTAL BILI 0.40 mg/dLCALCIUM 10.50 mg/dLeGFR 45 TRIGLYCERIDES 96.0 
mg/dLCHOLESTEROL 155.0 mg/dLHDL 38.0 mg/dLLDL (CALC) 98.0 mg/dLLithium 0.850 
mmol/LCancer Antigen (CA) 125 8.30 U/mL

 

                          2015 10:25 AM        Lithium 0.790 mmol/LWBC 4.8 RBC 3.44 HGB 11.0 g/dLHCT 35.20 

%.0 fLMCH 32.0 pgMCHC 31.30 g/dLRDW CV 14.0 %MPV 9.30 fLPLT 210 %NEUT 
70.80 %%LYMP 17.20 %%MONO 8.10 %%EOS 3.50 %%BASO 0.40 %#NEUT 3.41 #LYMP 0.83 
#MONO 0.39 #EOS 0.17 #BASO 0.02 TRIGLYCERIDES 107.0 mg/dLCHOLESTEROL 174.0 
mg/dLHDL 43.0 mg/dLLDL (CALC) 110.0 mg/dLGLUCOSE 90.0 mg/dLSODIUM 145.0 
mmol/LPOTASSIUM 3.80 mmol/LCHLORIDE 115.0 mmol/LCO2 24.0 mmol/LBUN 17.0 mg
/dLCREATININE 1.30 mg/dLSGOT/AST 18.0 IU/LSGPT/ALT 17.0 IU/LALK PHOS 56.0 
IU/LTOTAL PROTEIN 6.70 g/dLALBUMIN 3.90 g/dLTOTAL BILI 0.40 mg/dLCALCIUM 9.80 
mg/dLeGFR 41 

 

                          2015 8:50 AM        WBC 5.8 RBC 3.29 HGB 10.70 g/dLHCT 34.0 %.0 fLMCH 32.50

 pgMCHC 31.50 g/dLRDW CV 13.60 %MPV 9.60 fLPLT 223 %NEUT 69.60 %%LYMP 18.90 
%%MONO 8.50 %%EOS 2.80 %%BASO 0.20 %#NEUT 4.03 #LYMP 1.09 #MONO 0.49 #EOS 0.16 
#BASO 0.01 Lithium 0.620 mmol/LGLUCOSE 83.0 mg/dLSODIUM 139.0 mmol/LPOTASSIUM 
3.90 mmol/LCHLORIDE 109.0 mmol/LCO2 22.0 mmol/LBUN 19.0 mg/dLCREATININE 1.40 
mg/dLSGOT/AST 19.0 IU/LSGPT/ALT 21.0 IU/LALK PHOS 55.0 IU/LTOTAL PROTEIN 6.50 
g/dLALBUMIN 3.90 g/dLTOTAL BILI 0.50 mg/dLCALCIUM 9.60 mg/dLeGFR 38 
TRIGLYCERIDES 121.0 mg/dLCHOLESTEROL 192.0 mg/dLHDL 51.0 mg/dLLDL 121.0 mg/dLTSH
 1.210 uIU/mLHemoglobin A1c 5.40 %

 

                          3/15/2016 8:08 AM         VITAMIN B12 696.0 pg/mL

 

                          3/23/2016 8:26 AM         WBC 7.0 RBC 3.61 HGB 11.80 g/dLHCT 37.70 %.0 fLMCH 32.70

 pgHC 31.30 g/dLRDW CV 12.50 %MPV 10.0 fLPLT 207 %NEUT 73.60 %%LYMP 16.40 
%%MONO 6.60 %%EOS 3.0 %%BASO 0.30 %#NEUT 5.15 #LYMP 1.15 #MONO 0.46 #EOS 0.21 
#BASO 0.02 Lithium 0.940 mmol/LGLUCOSE 108.0 mg/dLSODIUM 143.0 mmol/LPOTASSIUM 
4.30 mmol/LCHLORIDE 110.0 mmol/LCO2 27.0 mmol/LBUN 16.0 mg/dLCREATININE 1.60 
mg/dLSGOT/AST 13.0 IU/LSGPT/ALT 7.0 IU/LALK PHOS 71.0 IU/LTOTAL PROTEIN 6.80 
g/dLALBUMIN 4.0 g/dLTOTAL BILI 0.20 mg/dLCALCIUM 10.40 mg/dLeGFR 32 
TRIGLYCERIDES 113.0 mg/dLCHOLESTEROL 169.0 mg/dLHDL 42.0 mg/dLLDL (CALC) 104.0 
mg/dLTSH 2.20 uIU/mLHemoglobin A1c 5.20 %

 

                          3/25/2016 9:17 AM         COLOR YELLOW APPEARANCE CLEAR SPEC GRAV 1.010 pH 7.0 PROTEIN 

NEGATIVE GLUCOSE NEGATIVE mg/dLKETONE NEGATIVE BILIRUBIN NEGATIVE BLOOD NEGATIVE
 NITRITE NEGATIVE LEUK SCREEN SMALL CASTS/LPF NEGATIVE /LPFCRYSTALS NEGATIVE 
MUCOUS THRDS NEGATIVE BACTERIA NEGATIVE EPITH CELLS FEW SQUAMOUS /HPFTRICHOMONAS
 NEGATIVE YEAST NEGATIVE 







History Of Immunizations







       Name    Date Admin    Mfg Name    Mfg Code    Trade Name    Lot#    Route    Inj    Vis Given    Vis

 Pub                                    CVX

 

        Influenza    2008    Not Entered    NE      Not Entered            Not Entered    Not Entered

                    1            999

 

        X       12/19/2008    [employer]    MSD     Pneumovax 23            Intramuscular    Not Entered

                    1            999

 

           Influenza    10/15/2010    [employer]    NOV        Fluvirin > 12 Years    

[id number]     Intramuscular    Left Deltoid    10/15/2010    2009    999

 

          X         3/23/2015    [facility]    [facility]       Prevnar 13    [id number]    Intramuscular

                Right Gluteous Medius    3/23/2015       2013       109







History of Past Illness







                    Name                Date of Onset       Comments

 

                    Peritoneal Neoplasm, Malignant                         

 

                    Hyperlipidemia                           

 

                    Bipolar disorder, unspecified                         

 

                    Artificial opening status; colostomy                         

 

                    B12 deficiency                           

 

                    Anemia, Pernicious                         

 

                    Arthritis unspecified                         

 

                    cervical cancer                          

 

                    Artificial opening status; colostomy    2010  1:10PM     

 

                    Bipolar disorder, unspecified    2010  1:10PM     

 

                    Hyperlipidemia      2010  1:10PM     

 

                    Anemia, Pernicious    2010  1:10PM     

 

                    Postoperative Follow-Up    2010  1:55PM     

 

                    Postoperative Follow-Up    Mar  8 2010 10:57AM     

 

                    Artificial opening status; colostomy    Mar  8 2010  1:19PM     

 

                    Peritoneal Neoplasm, Malignant    Mar  8 2010  1:19PM     

 

                    Artificial opening status; colostomy    2010  1:40PM     

 

                    Hyperlipidemia      2010  1:40PM     

 

                    Anemia, Pernicious    2010  1:40PM     

 

                    Peritoneal Neoplasm, Malignant    2010  1:40PM     

 

                    Arthritis unspecified    2010  1:40PM     

 

                    Anemia of Chronic Illness    2010  1:40PM     

 

                    Tinea corporis      2010  3:17PM     

 

                    Bipolar disorder, unspecified    2010  1:33PM     

 

                    Hyperlipidemia      2010  1:33PM     

 

                    Anemia, Pernicious    2010  1:33PM     

 

                    Peritoneal Neoplasm, Malignant    2010  1:33PM     

 

                    B12 deficiency      2010  1:33PM     

 

                    Ethmoidal Sinusitis, Acute    Sep 21 2010  3:53PM     

 

                    Wheezing            Sep 21 2010  3:53PM     

 

                    Flu                 Oct 15 2010  1:40PM     

 

                    Bipolar disorder, unspecified    Oct 15 2010  1:42PM     

 

                    Hyperlipidemia      Oct 15 2010  1:42PM     

 

                    Anemia, Pernicious    Oct 15 2010  1:42PM     

 

                    Peritoneal Neoplasm, Malignant    Oct 15 2010  1:42PM     

 

                    Bipolar disorder, unspecified    2011 12:01PM     

 

                    Hyperlipidemia      2011 12:01PM     

 

                    Anemia, Pernicious    2011 12:01PM     

 

                    Peritoneal Neoplasm, Malignant    2011 12:01PM     

 

                    Bipolar disorder, unspecified    Apr 15 2011 10:55AM     

 

                    Major Depression    2011 10:11AM     

 

                    Bipolar Disorder    2011 10:11AM     

 

                    Cancer              May 10 2011  4:16PM     

 

                    Major Depression    May 10 2011  3:16PM     

 

                    Bipolar Disorder    May 10 2011  3:16PM     

 

                    Hypercalcemia       May 23 2011  2:47PM     

 

                    Bipolar disorder, unspecified    May 23 2011  2:47PM     

 

                    Colon Cancer, Personal History    May 23 2011  2:47PM     

 

                    Bipolar Disorder    May 31 2011  4:39PM     

 

                    Depressive Disorder    2011 10:01AM     

 

                    Vitamin B12 deficiency    2011 10:01AM     

 

                    Vitamin D Deficiency    2011  5:07PM     

 

                    Anemia, Vitamin B12 Deficiency    2011  5:07PM     

 

                    B12 deficiency      2011  3:56PM     

 

                    Routine gynecological examination    Aug  4 2011  9:08AM     

 

                    Screening Examination for Breast Cancer    Aug  4 2011  9:08AM     

 

                    Tinea Corporis      Aug  4 2011  9:08AM     

 

                    Depressive Disorder    Sep 23 2011  8:47AM     

 

                    Contact Dermatitis    Sep 23 2011  8:47AM     

 

                    Anemia, Pernicious    Sep 23 2011  8:47AM     

 

                    B12 deficiency      Sep 23 2011  8:47AM     

 

                    B12 deficiency      Sep 27 2011  2:58PM     

 

                    B12 deficiency      Oct 20 2011  2:34PM     

 

                    Flu                 Dec  9 2011  3:16PM     

 

                    B12 deficiency      Dec  9 2011  3:17PM     

 

                    B12 deficiency      2012  4:52PM     

 

                    B12 deficiency      Feb 2012 11:10AM     

 

                    B12 deficiency      2012  3:37PM     

 

                    B12 deficiency      May  3 2012  4:10PM     

 

                    B12 deficiency      2012  2:54PM     

 

                    B12 deficiency      2012 11:23AM     

 

                    B12 deficiency      Aug  9 2012  2:08PM     

 

                    B12 deficiency      Sep  6 2012  4:36PM     

 

                    B12 deficiency      Oct 16 2012 10:23AM     

 

                    Flu                 Feb  4   3:11PM     

 

                    Bipolar disorder, unspecified    Feb  4   2:48PM     

 

                    Anemia, Pernicious    Feb  4   2:48PM     

 

                    B12 deficiency      Feb  4   2:48PM     

 

                    Extrapyramidal abnormal movement disorder    Feb  4   2:48PM     

 

                    B12 deficiency      Apr  3 2013 12:03PM     

 

                    Bipolar disorder, unspecified    May  7 2013  1:31PM     

 

                    Anemia, Pernicious    May  7 2013  1:31PM     

 

                    B12 deficiency      May  7 2013  1:31PM     

 

                    Extrapyramidal abnormal movement disorder    May  7 2013  1:31PM     

 

                    B12 deficiency      2013  3:42PM     

 

                    B12 deficiency      2013  1:31PM     

 

                    Hyperlipidemia      Aug  7 2013 10:37AM     

 

                    Vitamin D Deficiency    Aug  7 2013 10:37AM     

 

                    Bipolar disorder, unspecified    Aug  7 2013 10:37AM     

 

                    Anemia, Pernicious    Aug  7 2013 10:37AM     

 

                    B12 deficiency      Aug  7 2013 10:37AM     

 

                    B12 deficiency      Sep 25 2013 11:15AM     

 

                    B12 deficiency      Dec 11 2013  3:16PM     

 

                    B12 deficiency      Mar  6 2014  1:48PM     

 

                    B12 deficiency      May 21 2014  3:17PM     

 

                    Screening Examination for Breast Cancer    2014  3:23PM     

 

                    Periumbilical abdominal pain    2014  3:23PM     

 

                    B12 deficiency      Jul 10 2014  2:52PM     

 

                    Anemia, Vitamin B12 Deficiency    Aug 13 2014  4:50PM     

 

                    Bipolar disorder    Oct 16 2014 11:13AM     

 

                    Hyperlipidemia      Oct 16 2014 11:13AM     

 

                    Anemia, Pernicious    Oct 16 2014 11:13AM     

 

                    Peritoneal Neoplasm, Malignant    Oct 16 2014 11:13AM     

 

                    Screening breast examination    Oct 16 2014 11:13AM     

 

                    Weight loss         Oct 16 2014 11:13AM     

 

                    Anemia, Pernicious    Mar 23 2015  2:57PM     

 

                    B12 deficiency      Mar 23 2015  2:57PM     

 

                    Need for Prevnar vaccine    Mar 23 2015  2:57PM     

 

                    Bipolar disorder    Mar 23 2015  2:57PM     

 

                    Hyperlipidemia      Mar 23 2015  2:57PM     

 

                    Anemia, Pernicious    Mar 23 2015  2:57PM     

 

                    Peritoneal Neoplasm, Malignant    Mar 23 2015  2:57PM     

 

                    B12 deficiency      May  4 2015  4:48PM     

 

                    Hyperlipidemia      May 13 2015  9:56AM     

 

                    Anemia              May 13 2015  9:56AM     

 

                    Bipolar disorder    May 13 2015  9:56AM     

 

                    Bipolar disorder    May 14 2015  3:27PM     

 

                    Hyperlipidemia      May 14 2015  3:27PM     

 

                    Anemia, Pernicious    May 14 2015  3:27PM     

 

                    Peritoneal Neoplasm, Malignant    May 14 2015  3:27PM     

 

                    B12 deficiency      2015  2:20PM     

 

                    B12 deficiency      2015 11:34AM     

 

                    B12 deficiency      Aug 18 2015  9:06AM     

 

                    Tinea Corporis      Sep 18 2015  8:54AM     

 

                    B12 deficiency      Sep 18 2015  8:54AM     

 

                    B12 deficiency      2015 10:28AM     

 

                    Herpes zoster without complication    Dec  3 2015  9:52AM     

 

                    B12 deficiency      Dec 23 2015 11:21AM     

 

                    B12 deficiency      2016  4:51PM     

 

                    Vitamin B 12 deficiency    Mar 14 2016  5:35PM     

 

                    B12 deficiency      Mar 15 2016 12:14PM     

 

                    B12 deficiency      May  5 2016 11:30AM     

 

                    Edema               May  5 2016 11:30AM     

 

                    Dermatitis          May  5 2016 11:30AM     







Payers







           Insurance Name    Company Name    Plan Name    Plan Number    Policy Number    Policy Group

 Number                                 Start Date

 

                    Medicare Part A    Medicare RHC              [id number]              N/A

 

                          [employer]    [employer]                 [id number]

                                                    

 

                    Medicare Part A    Medicare - Lab/Xray              [id number]              2006

 

                    Medicare Part B    [place] [place]              [id number]              2006

 

                          [employer]    [facility]                 50 percent

                                                    2009

 

                    Human    [employer]              [id number]              N/A

 

                    Medicare Part A    Medicare Part A              [id number]              N/A

 

                    Medicare Part A    Medicare Part A              [id number]              2006









History of Encounters







                    Visit Date          Visit Type          Provider

 

                    2016            Office visit        Bhupinder Aspen DO

 

                    3/15/2016           Nurse visit         Bhupinder Aspen DO

 

                    2016            Nurse visit         Bhupinder Aspen DO

 

                    2015          Nurse visit         Bhupinder Aspen DO

 

                    12/3/2015           Office visit        Bhupinder Aspen DO

 

                    2015          Nurse visit         Bhupinder Aspen DO

 

                    2015           Office visit        Bhupinder Aspen DO

 

                    2015           Nurse visit         Bhupinder Aspen DO

 

                    2015            Nurse visit         Bhupinder Aspen DO

 

                    2015            Nurse visit         Bhupinder Aspen DO

 

                    2015           Office visit        Bhupinder Aspen DO

 

                    2015            Nurse visit         Bhupinder Aspen DO

 

                    3/23/2015           Office visit        Bhupinder Aspen DO

 

                    10/16/2014          Office visit        Bhupinder Aspen DO

 

                    2014           Nurse visit         Radha Weston GREEN

 

                    7/10/2014           Nurse visit         Bhupinder Aspen DO

 

                    2014           Office visit        Bhupinder Aspen DO

 

                    2014           Nurse visit         Bhupinder Aspen DO

 

                    3/6/2014            Nurse visit         Bhupinder Aspen DO

 

                    2014            [facility]            ERANEST Lopez MD

 

                    2013          Nurse visit         Bhupinder Aspen DO

 

                    2013           Nurse visit         Bhupinder Aspen DO

 

                    2013            Office visit        Bhupinder Aspen DO

 

                    2013            Nurse visit         Bhupinder Aspen DO

 

                    2013            Nurse visit         Bhupinder Aspen DO

 

                    2013            Office visit        Bhupinder Aspen DO

 

                    4/3/2013            Nurse visit         Bhupinder Aspen DO

 

                    2013            Office visit        Bhupinder Aspen DO

 

                    10/16/2012          Nurse visit         Bhupinder Aspen DO

 

                    2012            Nurse visit         Bhupinder Aspen DO

 

                    2012            Voided              Bhupinder Aspen DO

 

                    2012            Nurse visit         Bhupinder Aspen DO

 

                    2012            Nurse visit         Bhupinder Aspen DO

 

                    2012           Nurse visit         Bhupinder Aspen DO

 

                    5/3/2012            Nurse visit         Bhupinder Aspen DO

 

                    2012           Nurse visit         Bhupinder Aspen DO

 

                    2012           Nurse visit         Bhupinder Aspen DO

 

                    2012           Nurse visit         Bhupinder Aspen DO

 

                    2011           Nurse visit         Bhupinder Louise DO

 

                    10/20/2011          Nurse visit         Bhupinder Louise DO

 

                    2011           Office visit        Bhupinder Aspen DO

 

                    2011           Nurse visit         Radha GREEN

 

                    2011            Office visit        Bhupinder Aspen DO

 

                    2011           Nurse visit         Bhupinder Aspen DO

 

                    2011            Office visit        Bhupinder Aspen DO

 

                    2011           Office visit        Bhupinder Aspen DO

 

                    5/10/2011           Office visit        Bhupinder Aspen DO

 

                    2011           Office visit        Bhupinder Louise DO

 

                    4/15/2011           Office visit        Devin Angel DO

 

                    2011           Office visit        Devin Angel DO

 

                    10/15/2010          Office visit        Devin Angel DO

 

                    2010           Office visit        Devin Angel DO

 

                    2010            Office visit        Devin Angel DO

 

                    2010           Office visit        Devin Angel DO

 

                    2010            Office visit        Devin Angel DO

 

                    3/8/2010            Office visit        Devin Masterson MD

 

                    2010            Surgery             Devin Masterson MD

 

                    2010            Office visit        Devin Angel DO

 

                    2010           Surgery             Devin Masterson MD

 

                    2010           Hospital            Devin Masterson MD

 

                    2010           Hospital            Devin Masterson MD

 

                    10/22/2009          Office visit        Devin Angel DO

## 2019-06-26 NOTE — XMS REPORT
MU2 Ambulatory Summary

                             Created on: 2017



Pauline Gan

External Reference #: [id number]

: 1950

Sex: Female



Demographics







                          Address                   [address]

[place]  [address]

 

                          Home Phone                [phone]

 

                          Preferred Language        English

 

                          Marital Status            Legally 

 

                          Yazidi Affiliation     Unknown

 

                          Race                      White

 

                          Ethnic Group              Not  or 





Author







                          Author                    Radha Ontiveros

 

                          [facility]

 

                          Address                   [address]

[place], [place]  [id number]



 

                          Phone                     [phone]







Care Team Providers







                    Care Team Member Name    Role                Phone

 

                    Radha Ontiveros       PCP                 Unavailable

 

                    Bhupinder Louise    PreferredProvider    Unavailable







Allergies and Adverse Reactions







                    Name                Reaction            Notes

 

                    NO KNOWN DRUG ALLERGIES                         







Plan of Treatment







             Planned Activity    Comments     Planned Date    Planned Time    Plan/Goal

 

             Injection, Subcutaneous/IM                 2016    12:00 AM      

 

             Injection, Subcutaneous/IM                 2016    12:00 AM      

 

             Mammography; bilateral                 2016    12:00 AM      

 

             Injection, Subcutaneous/IM                 2016    12:00 AM      

 

             Urine Culture, [place] Count                 2017    12:00 AM      

 

             CBC with Auto                 2013     12:00 AM      

 

             Lithium                   2013     12:00 AM      

 

             Basic metabolic panel                 2013     12:00 AM      

 

             Vitamin B12 (cyanocobalamin)                 2013     12:00 AM      

 

             Folic acid, serum                 2013     12:00 AM      

 

             Injection,Subcutaneous/Intramuscul                 2013    12:00 AM      







Medications







                                        Active 

 

             Name         Start Date    Estimated Completion Date    SIG          Comments

 

                Latuda 20 mg oral tablet                                    take 1 tablet (20 mg) by oral route once daily with

 food (at least 350 calories)            

 

             pravastatin 40 mg oral tablet    3/30/2015                 TAKE 1 TABLET BY MOUTH DAILY     

 

                Namenda XR 28 mg oral capsule,sprinkle,ER 24hr    2015                       take 1 capsule (28

 mg) by oral route once daily            

 

                Namenda XR 28 mg oral capsule,sprinkle,ER 24hr    2016                       take 1 capsule (28

 mg) by oral route once daily            

 

                triamcinolone acetonide 0.1 % topical cream    2016                        apply a thin layer to 

the affected area(s) by topical route 2 times per day     

 

                potassium chloride 10 mEq oral tablet extended release    2016                       take 1 tablet

 (10 meq) by oral route once daily       

 

             pravastatin 40 mg oral tablet    2016                 TAKE 1 TABLET BY MOUTH DAILY     

 

                Vitamin B-12 1,000 mcg/mL injection solution    2016                       inject 1 milliliter 

(1,000 mcg) by intramuscular route once a month     

 

                potassium chloride 10 mEq oral tablet extended release    2016                      take 1 tablet

 (10 meq) by oral route once daily       

 

                Namenda XR 28 mg oral capsule,sprinkle,ER 24hr    2016                      TAKE 1 CAPSULE BY

 MOUTH EVERY DAY                         

 

                furosemide 40 mg oral tablet    2016                      take 1 tablet (40 mg) by oral route

 once daily                              

 

                metoclopramide HCl 10 mg oral tablet    2017       take 1 tablet by oral

 route 2 times a day for 50 days         

 

                Cipro 500 mg oral tablet    1/15/2017       2017       take 1 tablet (500 mg) by oral route

 every 12 hours for 10 days              

 

                Augmentin 875-125 mg oral tablet    2017       take 1 tablet by oral route

 every 12 hours for 7 days               









                                         

 

             Name         Start Date    Expiration Date    SIG          Comments

 

             Reglan 10mg    3/29/2010    2010    one ac and hs     

 

                Keflex 500 mg oral capsule    2010       10/1/2010       take 1 capsule (500 mg) by oral

 route every 6 hours for 10 days         

 

                Bactrim -160 mg oral tablet    2011       take 1 tablet by oral route

 every 12 hours for 7 days               

 

                triamcinolone acetonide 0.1 % topical cream    2011      apply a thin

 layer to the affected area(s) by topical route 2 times per day     

 

                sertraline 100 mg oral tablet    4/10/2012       5/10/2012       take 1.5 tablets by oral route

 daily for 30 days                       

 

                ergocalciferol (vitamin D2) 50,000 unit oral capsule    4/15/2013       2013       TAKE

 ONE CAPSULE BY MOUTH ONCE A WEEK        

 

                CYANOCOBALAM 1000MCGINJ 1000 milliliter    2013       INJECT 1ML INTRAMUSCULAR

 ONCE A MONTH                            

 

                pravastatin 40 mg oral tablet    3/25/2014       3/20/2015       TAKE ONE TABLET BY MOUTH EVERY

 DAY                                     

 

                          Zostavax (PF) 19,400 unit/0.65 mL subcutaneous suspension for reconstitution    3/23/2015

                    3/24/2015           inject 0.65 milliliter by subcutaneous route once     

 

                famciclovir 500 mg oral tablet    12/3/2015       12/10/2015      take 1 tablet (500 mg) by

 oral route every 8 hours for 7 days     

 

                furosemide 40 mg oral tablet    2016      take 1 tablet (40 mg) by oral

 route once daily                        

 

                Cipro 500 mg oral tablet    2016       take 1 tablet (500 mg) by oral route

 2 times per day for 5 days              

 

                Bactrim -160 mg oral tablet    2016        take 1 tablet by oral route

 every 12 hours for 7 days               

 

                Macrobid 100 mg oral capsule    2017       take 1 capsule (100 mg) by oral

 route 2 times per day with food for 7 days     









                                        Discontinued 

 

             Name         Start Date    Discontinued Date    SIG          Comments

 

                Tylenol 325 mg oral tablet                    2013        take 1 - 2 tablets (325 -650 mg) by oral

 route every 4-6 hours as needed         

 

                Calcium 600 + D(3) 600 mg(1,500mg) -400 unit oral tablet                    2011       take 1 tablet

 by oral route 2 times a day            no longer taking

 

                Vitamin B-12 1,000 mcg oral tablet extended release    2010       take 1

 tablet by oral route daily             no longer taking

 

                Antifungal (clotrimazole) 1 % topical cream    2010       apply to the 

affected and surrounding areas of skin by topical route 2 times per day morning 
and evening                              

 

                sertraline 100 mg oral tablet    5/10/2011       2011       take 2 tablets (200 mg) by 

oral route once daily                   discontinued by Dr. Serrano

 

                mirtazapine 15 mg oral tablet                    2011        take 1 tablet (15 mg) by oral route 

once daily before bedtime               Dr. Serrano

 

                mirtazapine 15 mg oral tablet                    2011        take 1 tablet (15 mg) by oral route 

once daily before bedtime               dc'd by Dr. Serrano

 

                Pristiq 50 mg oral tablet extended release 24 hr                    2013        take 1 tablet (50

 mg) by oral route once daily           Dr. Serrano

 

                Pristiq 50 mg oral tablet extended release 24 hr                    2013        take 1 tablet (50

 mg) by oral route once daily           dose updated

 

                Vitamin B-12 1,000 mcg/mL injection solution    2011        inject 1 milliliter

 (1,000 mcg) by intramuscular route once a month    on list already

 

                    syringe with needle 1 mL 25 gauge x 1" miscellaneous syringe    2011

                          use for injection once a month     

 

                clotrimazole 1 % topical cream    2011        apply to the affected and surrounding

 areas of skin by topical route 2 times per day in the morning and evening     

 

                Vitamin D2 50,000 unit oral capsule    2011        take 1 capsule (50,000

 unit) by oral route once weekly        generic on list

 

                Pravachol 40 mg oral tablet    2012        take 1 tablet (40 mg) by oral 

route once daily for 90 days            generic on list

 

                lithium carbonate 300 mg oral capsule    2012        take 1 capsule by oral

 route daily                            dose updated

 

                Pristiq 100 mg oral tablet extended release 24 hr                    4/10/2012       take 1 and 1/2 

tablet (150 mg) by oral route once daily    Mental Health provider

 

                Pristiq 100 mg oral tablet extended release 24 hr                    4/10/2012       take 1 and 1/2 

tablet (150 mg) by oral route once daily    Discontinued by Dr Efrain [facility] at [facility]

 

                hydroxyzine HCl 50 mg oral tablet    10/16/2014      2015       take 1 tablet (50 mg) 

by oral route at bedtime                 

 

                lithium carbonate 300 mg oral capsule    2015       take 1 capsule (300

 mg) by oral route 2 for 30 days         

 

                fluconazole 100 mg oral tablet    2015       12/3/2015       take 1 tablet (100 mg) by 

oral route once a week                   

 

                ketoconazole 2 % topical cream    2015       12/3/2015       apply to the affected area(s)

 by topical route 2 times per day        

 

                prednisone 10 mg oral tablet    12/3/2015       2016        take 2 tablets (20 mg) by oral

 route once daily for 4 days 1 tablet daily for 4 days 0.5 tablet daily for 4 
days                                     







Problem List







                    Description         Status              Onset

 

                    Artificial opening status; colostomy    Active               

 

                    Bipolar disorder, unspecified    Active               

 

                    Hyperlipidemia      Active               

 

                    Peritoneal Neoplasm, Malignant    Active               

 

                    Anemia, Pernicious    Active               

 

                    Arthritis unspecified    Active               

 

                    B12 deficiency      Active               







Vital Signs







      Date    Time    BP-Sys(mm[Hg]    BP-Lynn(mm[Hg])    HR(bpm)    RR(rpm)    Temp    WT    HT    HC    BMI

                    BSA                 BMI Percentile      O2 Sat(%)

 

        2017    11:07:00 AM    124 mmHg    64 mmHg    62 bpm    17 rpm    98.2 F    181.2 lbs    69

 in                       26.76 kg/m2    2.00 m2                   98 %

 

        1/15/2017    3:34:00 PM    148 mmHg    89 mmHg    69 bpm    20 rpm    98.2 F    179 lbs    69 in

                          26.4334 kg/m    1.9882 m                 98 %

 

       2017    1:51:00 PM    160 mmHg    90 mmHg    100 bpm    20 rpm    96.5 F    179 lbs             

                                                                98 %

 

       2016    3:11:00 PM    134 mmHg    76 mmHg    80 bpm    20 rpm    98 F    163 lbs    69 in     

                24.0706 kg/m    1.8972 m                      98 %

 

        2016    2:04:00 PM    142 mmHg    86 mmHg    68 bpm    16 rpm    98.5 F    166 lbs    63 in

                          29.41 kg/m2    1.83 m2                   100 %

 

        2016    11:27:00 AM    148 mmHg    78 mmHg    90 bpm    20 rpm    98.2 F    153 lbs    69 in

                          22.5939 kg/m    1.8381 m                 96 %

 

        12/3/2015    9:50:00 AM    132 mmHg    70 mmHg    62 bpm    16 rpm    97.9 F    145 lbs    69 in

                          21.41 kg/m2    1.79 m2                   100 %

 

        2015    8:52:00 AM    132 mmHg    68 mmHg    52 bpm    20 rpm    97.8 F    141 lbs    69 in

                          20.8218 kg/m    1.7645 m                 100 %

 

        2015    3:25:00 PM    120 mmHg    62 mmHg    72 bpm    16 rpm    98.1 F    136 lbs    69 in

                          20.08 kg/m2    1.73 m2                   98 %

 

       3/23/2015    2:55:00 PM    130 mmHg    76 mmHg    68 bpm    18 rpm    97 F    140 lbs    69 in    

                20.6742 kg/m    1.7583 m                      98 %

 

        10/16/2014    11:11:00 AM    120 mmHg    66 mmHg    77 bpm    20 rpm    98 F    130 lbs    69 in

                          19.20 kg/m2    1.69 m2                   100 %

 

        2014    3:21:00 PM    130 mmHg    66 mmHg    63 bpm    18 rpm    97.2 F    160 lbs    69 in

                          23.6276 kg/m    1.8797 m                 99 %

 

        2013    10:35:00 AM    132 mmHg    70 mmHg    66 bpm    20 rpm    98.1 F    157 lbs    69 in

                          23.18 kg/m2    1.86 m2                    

 

        2013    1:29:00 PM    132 mmHg    70 mmHg    76 bpm    18 rpm    98.2 F    166 lbs    69 in 

                          24.5137 kg/m    1.9146 m                  

 

       2013    2:46:00 PM    128 mmHg    70 mmHg    76 bpm    16 rpm    98 F    160 lbs    69 in     

                23.63 kg/m2     1.88 m2                          

 

        2011    8:49:00 AM    128 mmHg    78 mmHg    70 bpm    18 rpm    97.9 F    164 lbs    69 in

                          24.2183 kg/m    1.903 m                  

 

     2011    1:31:00 PM    132 mmHg    68 mmHg    84 bpm         97 F    167 lbs                        

                                         

 

        2011    9:09:00 AM    128 mmHg    70 mmHg    72 bpm    18 rpm    98.2 F    163 lbs    64 in 

                          27.9786 kg/m    1.8272 m                  

 

       2011    10:01:00 AM    132 mmHg    70 mmHg    72 bpm    18 rpm    98.2 F    154 lbs             

                                                                 

 

       2011    2:47:00 PM    128 mmHg    70 mmHg    72 bpm    18 rpm    97.8 F    156 lbs             

                                                                 

 

       5/10/2011    3:16:00 PM    144 mmHg    80 mmHg    72 bpm    18 rpm    98.2 F    158 lbs             

                                                                 

 

        2011    10:11:00 AM    132 mmHg    70 mmHg    70 bpm    18 rpm    98.2 F    168 lbs    69 in

                          24.809 kg/m    1.9261 m                  

 

        4/15/2011    10:52:00 AM    110 mmHg    60 mmHg    75 bpm    16 rpm    97.5 F    172.375 lbs    

69 in                     25.46 kg/m2    1.95 m2                   100 %

 

        2011    11:43:00 AM    120 mmHg    82 mmHg    75 bpm    16 rpm    97.2 F    178.5 lbs    69

 in                       26.3596 kg/m    1.9854 m                 100 %

 

        10/15/2010    1:32:00 PM    120 mmHg    70 mmHg    80 bpm    18 rpm    96.6 F    177 lbs    69 in

                          26.14 kg/m2    1.98 m2                   100 %

 

        2010    3:50:00 PM    168 mmHg    100 mmHg    82 bpm    18 rpm    97.8 F    177.5 lbs    69

 in                       26.2119 kg/m    1.9798 m                 97 %

 

        2010    1:21:00 PM    140 mmHg    80 mmHg    59 bpm    16 rpm    97.6 F    173.25 lbs    69 

in                        25.58 kg/m2    1.96 m2                   100 %

 

        2010    3:02:00 PM    140 mmHg    80 mmHg    61 bpm    16 rpm    97.6 F    173.125 lbs    69

 in                       25.5658 kg/m    1.9553 m                 99 %

 

        2010    1:23:00 PM    130 mmHg    80 mmHg    66 bpm    16 rpm    96.8 F    173 lbs    69 in 

                          25.55 kg/m2    1.95 m2                   100 %

 

        2010    12:58:00 PM    130 mmHg    88 mmHg    75 bpm    16 rpm    98.4 F    172.25 lbs    69

 in                       25.4366 kg/m    1.9503 m                 100 %







Social History







                    Name                Description         Comments

 

                    denies alcohol use                         

 

                    denies smoking                           

 

                    Denies illicit substance abuse                         

 

                    retired                                 direct care

 

                    Single                                   

 

                    Exercises regularly                         

 

                    Attended some college                         

 

                    Tobacco             Never smoker         







History of Procedures







                    Date Ordered        Description         Order Status

 

                    2010 12:00 AM    COMPREHEN METABOLIC PANEL    Reviewed

 

                    2010 12:00 AM    COMPLETE CBC W/AUTO DIFF WBC    Reviewed

 

                    2010 12:00 AM    LIPID PANEL         Reviewed

 

                          2015 12:00 AM        B12 Injection, Up to 1000 Mcg NDC#4589-1673-61 [facility] 

                                        Reviewed

 

                    2011 12:00 AM    MAMMOGRAM SCREENING    Reviewed

 

                    2011 12:00 AM    CYTOPATH C/V THIN LAYER    Reviewed

 

                    2011 12:00 AM    B12 Injection 1 cc NDC#[address]-34    Reviewed

 

                    2015 12:00 AM    THER/PROPH/DIAG INJ SC/IM    Reviewed

 

                    2015 12:00 AM    B12 Injection, Up to 1000 Mcg NDC#2059-4893-90    Reviewed

 

                    2011 12:00 AM    THER/PROPH/DIAG INJ SC/IM    Reviewed

 

                    2011 12:00 AM    B12 Injection([place]) Ndc#9428-7519-49-    Reviewed

 

                    2015 12:00 AM    THER/PROPH/DIAG INJ SC/IM    Reviewed

 

                    2015 12:00 AM    B12 Injection, Up to 1000 Mcg NDC#8898-8450-28    Reviewed

 

                    10/20/2011 12:00 AM    THER/PROPH/DIAG INJ SC/IM    Reviewed

 

                    10/20/2011 12:00 AM    B12 Injection([place]) Ndc#3469-5413-38-    Reviewed

 

                    2016 12:00 AM    THER/PROPH/DIAG INJ SC/IM    Reviewed

 

                    2016 12:00 AM    B12 Injection, Up to 1000 Mcg NDC#6535-8581-51    Reviewed

 

                    3/14/2016 12:00 AM    VITAMIN B-12        Reviewed

 

                    3/15/2016 12:00 AM    THER/PROPH/DIAG INJ SC/IM    Reviewed

 

                    3/15/2016 12:00 AM    B12 Injection, Up to 1000 Mcg NDC#4282-9559-70    Reviewed

 

                    2011 12:00 AM    ***Immunization administration, Medicare flu    Reviewed

 

                    2011 12:00 AM    Fluzone ** MEDICARE Only **    Reviewed

 

                    2011 12:00 AM    THER/PROPH/DIAG INJ SC/IM    Reviewed

 

                    2011 12:00 AM    B12 Injection ([employer]) Ndc#1721-6775-05    Reviewed

 

                    2016 12:00 AM    B12 Injection, Up to 1000 Mcg NDC#9885-5951-36 [facility]    

Reviewed

 

                    2016 12:00 AM    TTE W/DOPPLER COMPLETE    Reviewed

 

                    2016 12:00 AM    EXTREMITY STUDY     Returned

 

                          2016 12:00 AM        B12 Injection, Up to 1000 Mcg NDC#1181-0515-69 [facility] 

                                        Reviewed

 

                    2016 12:00 AM    THER/PROPH/DIAG INJ SC/IM    Reviewed

 

                    2012 12:00 AM    THER/PROPH/DIAG INJ SC/IM    Reviewed

 

                    2012 12:00 AM    B12 Injection ([employer]) Ndc#7072-3042-05    Reviewed

 

                    2016 12:00 AM    THER/PROPH/DIAG INJ SC/IM    Reviewed

 

                    2016 12:00 AM    B12 Injection, Up to 1000 Mcg NDC#9678-4693-78    Reviewed

 

                    2012 12:00 AM    THER/PROPH/DIAG INJ SC/IM    Reviewed

 

                    2012 12:00 AM    B12 Injection([place]) Ndc#4541-2184-34-    Reviewed

 

                    12/15/2016 12:00 AM    B12 Injection, Up to 1000 Mcg NDC#4508-4187-76    Reviewed

 

                    12/15/2016 12:00 AM    THER/PROPH/DIAG INJ SC/IM    Reviewed

 

                    2016 12:00 AM    URNLS DIP STICK/TABLET RGNT AUTO W/O MICROSCOPY    Returned

 

                    1/3/2017 12:00 AM    URNLS DIP STICK/TABLET RGNT AUTO W/O MICROSCOPY    Returned

 

                    2017 12:00 AM    Rocephin 1 gram Injection, [facility]    Reviewed

 

                    2017 12:00 AM    THERAPEUTIC PROPHYLACTIC/DX INJECTION SUBQ/IM    Reviewed

 

                    2017 12:00 AM    B12 1000mcg Injection, [facility]    Reviewed

 

                    5/3/2012 12:00 AM    THER/PROPH/DIAG INJ SC/IM    Reviewed

 

                    5/3/2012 12:00 AM    B12 Injection([place]) Ndc#9914-7040-46-    Reviewed

 

                    2012 12:00 AM    IMMUNOTHERAPY INJECTIONS    Reviewed

 

                    2012 12:00 AM    B12 Injection([place]) Ndc#1749-2304-69-    Reviewed

 

                    2012 12:00 AM    THER/PROPH/DIAG INJ SC/IM    Reviewed

 

                    2012 12:00 AM    B12 Injection, Up to 1000 Mcg NDC#2469-7929-75    Reviewed

 

                    2012 12:00 AM    THER/PROPH/DIAG INJ SC/IM    Reviewed

 

                    2012 12:00 AM    B12 Injection, Up to 1000 Mcg NDC#8314-0457-16    Reviewed

 

                    2012 12:00 AM    THER/PROPH/DIAG INJ SC/IM    Reviewed

 

                    2012 12:00 AM    B12 Injection, Up to 1000 Mcg NDC#4950-6145-71    Reviewed

 

                    10/16/2012 12:00 AM    THER/PROPH/DIAG INJ SC/IM    Reviewed

 

                    10/16/2012 12:00 AM    B12 Injection, Up to 1000 Mcg NDC#7276-8934-00    Reviewed

 

                    2010 12:00 AM    COMPREHEN METABOLIC PANEL    Reviewed

 

                    2010 12:00 AM    COMPLETE CBC W/AUTO DIFF WBC    Reviewed

 

                    2010 12:00 AM    LIPID PANEL         Reviewed

 

                    2013 12:00 AM    Flu Injection 3 Years And Above NDC# [address]-39  [facility]    Reviewed



 

                    2013 12:00 AM    COMPLETE CBC W/AUTO DIFF WBC    Reviewed

 

                    2013 12:00 AM    ASSAY OF LITHIUM    Reviewed

 

                    2013 12:00 AM    METABOLIC PANEL TOTAL CA    Reviewed

 

                    4/3/2013 12:00 AM    THER/PROPH/DIAG INJ SC/IM    Reviewed

 

                    4/3/2013 12:00 AM    B12 Injection, Up to 1000 Mcg NDC#1140-4161-56    Reviewed

 

                    2013 12:00 AM    THER/PROPH/DIAG INJ SC/IM    Reviewed

 

                    2013 12:00 AM    B12 Injection, Up to 1000 Mcg NDC#5723-7549-04    Reviewed

 

                    2013 12:00 AM    THER/PROPH/DIAG INJ SC/IM    Reviewed

 

                    2013 12:00 AM    B12 Injection, Up to 1000 Mcg NDC#5831-3355-83    Reviewed

 

                    2013 12:00 AM    LIPID PANEL         Reviewed

 

                    2013 12:00 AM    VITAMIN D 25 HYDROXY    Reviewed

 

                    2013 12:00 AM    THER/PROPH/DIAG INJ SC/IM    Reviewed

 

                    3/6/2014 12:00 AM    THER/PROPH/DIAG INJ SC/IM    Reviewed

 

                    2014 12:00 AM    THER/PROPH/DIAG INJ SC/IM    Reviewed

 

                    2014 12:00 AM    B12 Injection, Up to 1000 Mcg NDC#4347-8049-33    Reviewed

 

                    2010 12:00 AM    SKIN FUNGI CULTURE    Reviewed

 

                    10/9/2010 12:00 AM    COMPREHEN METABOLIC PANEL    Reviewed

 

                    10/9/2010 12:00 AM    LIPID PANEL         Reviewed

 

                    2010 12:00 AM    THER/PROPH/DIAG INJ SC/IM    Reviewed

 

                    2010 12:00 AM    B12 Injection Ndc#[address]-29 ([facility])    Reviewed

 

                    2010 12:00 AM    THER/PROPH/DIAG INJ SC/IM    Reviewed

 

                    2010 12:00 AM    Kenalog 40 Mg Im-Ndc#[address]-60 ([facility])    Reviewed

 

                    10/15/2010 12:00 AM    FLU VACCINE 3 YRS & > IM    Reviewed

 

                    10/15/2010 12:00 AM    Admin.Of M/C Cov.Vaccine-Flu Vacc.    Reviewed

 

                    1/15/2011 12:00 AM    COMPLETE CBC W/AUTO DIFF WBC    Reviewed

 

                    1/15/2011 12:00 AM    COMPREHEN METABOLIC PANEL    Reviewed

 

                    1/15/2011 12:00 AM    LIPID PANEL         Reviewed

 

                    2014 12:00 AM    MAMMOGRAM SCREENING    Reviewed

 

                    2014 12:00 AM    Screening mammography, bilateral    Reviewed

 

                    7/10/2014 12:00 AM    THER/PROPH/DIAG INJ SC/IM    Reviewed

 

                    7/10/2014 12:00 AM    B12 Injection, Up to 1000 Mcg NDC#8125-5839-04    Reviewed

 

                    2011 12:00 AM    COMPLETE CBC W/AUTO DIFF WBC    Reviewed

 

                    2011 12:00 AM    COMPREHEN METABOLIC PANEL    Reviewed

 

                    2011 12:00 AM    LIPID PANEL         Reviewed

 

                    2014 12:00 AM    B12 Injection, Up to 1000 Mcg NDC#9193-4096-89    Reviewed

 

                    10/19/2014 12:00 AM    MAMMOGRAM SCREENING    Reviewed

 

                    10/19/2014 12:00 AM    Screening mammography, bilateral    Reviewed

 

                    10/16/2014 12:00 AM    COMPLETE CBC W/AUTO DIFF WBC    Reviewed

 

                    10/16/2014 12:00 AM    COMPREHEN METABOLIC PANEL    Reviewed

 

                    10/16/2014 12:00 AM    IMMUNOASSAY TUMOR     Reviewed

 

                    10/16/2014 12:00 AM    LIPID PANEL         Reviewed

 

                    10/16/2014 12:00 AM    ASSAY OF LITHIUM    Reviewed

 

                    10/16/2014 12:00 AM    MAMMOGRAM SCREENING    Reviewed

 

                    2011 12:00 AM    ASSAY OF PARATHORMONE    Reviewed

 

                    2011 12:00 AM    VITAMIN D 25 HYDROXY    Reviewed

 

                    2011 12:00 AM    ASSAY OF LITHIUM    Reviewed

 

                    2011 12:00 AM    METABOLIC PANEL TOTAL CA    Reviewed

 

                    2011 12:00 AM    CT HEAD/BRAIN W/O & W/DYE    Reviewed

 

                    3/23/2015 12:00 AM    PNEUMOCOCCAL VACC 13 GLENDY IM    Reviewed

 

                    3/23/2015 12:00 AM    Vitamin B12 injection    Reviewed

 

                    2011 12:00 AM    ASSAY OF LITHIUM    Reviewed

 

                    2011 12:00 AM    B12 Injection Ndc#[address]-12  [facility]    Reviewed

 

                    2015 12:00 AM    THER/PROPH/DIAG INJ SC/IM    Reviewed

 

                    2015 12:00 AM    B12 Injection, Up to 1000 Mcg NDC#2068-8511-90    Reviewed

 

                    2015 12:00 AM    COMPLETE CBC W/AUTO DIFF WBC    Reviewed

 

                    2015 12:00 AM    COMPREHEN METABOLIC PANEL    Reviewed

 

                    2015 12:00 AM    LIPID PANEL         Reviewed

 

                    2015 12:00 AM    ASSAY OF LITHIUM    Reviewed

 

                    2011 12:00 AM    VIT D 1 25-DIHYDROXY    Reviewed

 

                    2011 12:00 AM    VITAMIN B-12        Reviewed

 

                    2015 12:00 AM    B12 Injection, Up to 1000 Mcg NDC#2231-5112-19    Reviewed

 

                    2015 12:00 AM    THER/PROPH/DIAG INJ SC/IM    Reviewed

 

                    2015 12:00 AM    B12 Injection, Up to 1000 Mcg NDC#7353-8592-91    Reviewed

 

                    2011 12:00 AM    THER/PROPH/DIAG INJ SC/IM    Reviewed

 

                    2011 12:00 AM    B12 Injection ([employer]) Ndc#5943-0938-64    Reviewed

 

                    2015 12:00 AM    THER/PROPH/DIAG INJ SC/IM    Reviewed

 

                    2015 12:00 AM    B12 Injection, Up to 1000 Mcg NDC#0692-2769-58    Reviewed







Results Summary







                          Data and Description      Results

 

                          2004 12:00 AM        Colonoscopy-Women and Men over 50 Normal 

 

                          2008 12:00 AM         Pap Smear Declined 

 

                          10/7/2009 12:00 AM        Cholest Cry Stone Ql .0 %LDLc SerPl-mCnc 123.0 mg/dLHDLc

 SerPl-mCnc 34.0 mg/dLTrigl SerPl-mCnc 190.0 mg/dLGlucose SerPl-mCnc 78.0 mg/dL

 

                          2009 12:00 AM        Mammogram -Women over 40 Normal HIV1+2 Ab Ser Ql no risk 

 

                          2010 8:47 AM         Dexa Bone Scan Refused Aspirin reccommended Contraindication 



 

                          2010 8:48 AM         Depression Done 

 

                          2010 12:00 AM         Foot Exam-Diabetic Done 

 

                          2010 12:00 AM         Cholest Cry Stone Ql .0 %LDLc SerPl-mCnc 126.0 mg/dLGlucose

 SerPl-mCnc 102.0 mg/dL

 

                          2010 8:45 AM          TRIGLYCERIDES 122.0 mg/dLCHOLESTEROL 186.0 mg/dLHDL 36.0 mg/dLTOT

 CHOL/HDL 5.2 LDL (CALC) 126.0 mg/dLGLUCOSE 102.0 mg/dLSODIUM 143.0 
mmol/LPOTASSIUM 3.70 mmol/LCHLORIDE 111.0 mmol/LCO2 23.0 mmol/LBUN 10.0 
mg/dLCREATININE 0.80 mg/dLSGOT/AST 12.0 IU/LSGPT/ALT 11.0 IU/LALK PHOS 65.0 
IU/LTOTAL PROTEIN 7.20 g/dLALBUMIN 3.90 g/dLTOTAL BILI 0.50 mg/dLCALCIUM 10.20 
mg/dLAGE 59 GFR NonAA 73 GFR AA 88 eGFR >60 mL/min/1.73 m2eGFR AA* >60 WBC 5.7 
RBC 3.26 HGB 10.60 g/dLHCT 31.70 %MCV 97.0 fLMCH 32.50 pgMCHC 33.40 g/dLRDW SD 
47 RDW CV 13.30 %MPV 9.70 fLPLT 287 NRBC# 0.00 NRBC% 0.0 %NEUT 62.90 %%LYMP 
21.80 %%MONO 9.90 %%EOS 5.0 %%BASO 0.40 %#NEUT 3.56 #LYMP 1.23 #MONO 0.56 #EOS 
0.28 #BASO 0.02 MANUAL DIFF NOT IND 

 

                          2010 12:00 AM        Glucose SerPl-mCnc 96.0 mg/dLCholest Cry Stone Ql .0 %LDLc

 SerPl-mCnc 146.0 mg/dL

 

                          2010 8:26 AM         TRIGLYCERIDES 106.0 mg/dLCHOLESTEROL 199.0 mg/dLHDL 32.0 mg/dLTOT

 CHOL/HDL 6.2 LDL (CALC) 146.0 mg/dLGLUCOSE 96.0 mg/dLSODIUM 143.0 
mmol/LPOTASSIUM 4.0 mmol/LCHLORIDE 113.0 mmol/LCO2 24.0 mmol/LBUN 13.0 
mg/dLCREATININE 1.0 mg/dLSGOT/AST 11.0 IU/LSGPT/ALT 6.0 IU/LALK PHOS 56.0 
IU/LTOTAL PROTEIN 6.60 g/dLALBUMIN 3.80 g/dLTOTAL BILI 0.50 mg/dLCALCIUM 9.30 
mg/dLAGE 59 GFR NonAA 57 GFR AA 69 eGFR 57 eGFR AA* >60 

 

                          10/6/2010 12:00 AM        Cholest Cry Stone Ql .0 %LDLc SerPl-mCnc 111.0 mg/dLGlucose

 SerPl-mCnc 81.0 mg/dL

 

                          10/6/2010 2:45 PM         TRIGLYCERIDES 123.0 mg/dLCHOLESTEROL 178.0 mg/dLHDL 42.0 mg/dLTOT

 CHOL/HDL 4.2 LDL (CALC) 111.0 mg/dLGLUCOSE 81.0 mg/dLSODIUM 139.0 
mmol/LPOTASSIUM 4.10 mmol/LCHLORIDE 106.0 mmol/LCO2 24.0 mmol/LBUN 13.0 
mg/dLCREATININE 0.90 mg/dLSGOT/AST 13.0 IU/LSGPT/ALT 11.0 IU/LALK PHOS 61.0 
IU/LTOTAL PROTEIN 7.10 g/dLALBUMIN 3.90 g/dLTOTAL BILI 0.30 mg/dLCALCIUM 9.30 
mg/dLAGE 60 GFR NonAA 64 GFR AA 78 eGFR >60 mL/min/1.73 m2eGFR AA* >60 WBC 6.9 
RBC 3.59 HGB 11.50 g/dLHCT 35.30 %MCV 98.0 fLMCH 32.0 pgMCHC 32.60 g/dLRDW SD 46
 RDW CV 12.90 %MPV 9.90 fLPLT 311 NRBC# 0.00 NRBC% 0.0 %NEUT 64.90 %%LYMP 22.50 
%%MONO 7.20 %%EOS 5.10 %%BASO 0.30 %#NEUT 4.45 #LYMP 1.54 #MONO 0.49 #EOS 0.35 
#BASO 0.02 MANUAL DIFF NOT IND 

 

                          2011 12:00 AM         Mammogram -Women over 40 Ordered 

 

                          2011 10:25 AM        TRIGLYCERIDES 111.0 mg/dLCHOLESTEROL 195.0 mg/dLHDL 43.0 mg/dLTOT

 CHOL/HDL 4.5 LDL (CALC) 130.0 mg/dLWBC 5.3 RBC 3.76 HGB 12.0 g/dLHCT 37.80 %MCV
 101.0 fLMCH 31.90 pgMCHC 31.70 g/dLRDW SD 47 RDW CV 13.0 %MPV 9.70 fLPLT 259 
NRBC# 0.00 NRBC% 0.0 %NEUT 69.0 %%LYMP 17.60 %%MONO 8.30 %%EOS 4.70 %%BASO 0.40 
%#NEUT 3.63 #LYMP 0.93 #MONO 0.44 #EOS 0.25 #BASO 0.02 MANUAL DIFF NOT IND 
GLUCOSE 102.0 mg/dLSODIUM 146.0 mmol/LPOTASSIUM 4.20 mmol/LCHLORIDE 113.0 
mmol/LCO2 23.0 mmol/LBUN 15.0 mg/dLCREATININE 1.0 mg/dLSGOT/AST 12.0 
IU/LSGPT/ALT 17.0 IU/LALK PHOS 60.0 IU/LTOTAL PROTEIN 6.90 g/dLALBUMIN 4.20 
g/dLTOTAL BILI 0.40 mg/dLCALCIUM 9.70 mg/dLAGE 60 GFR NonAA 57 GFR AA 69 eGFR 57
 eGFR AA* >60 

 

                          2011 11:49 AM        Cholest Cry Stone Ql .0 %LDLc SerPl-mCnc 130.0 mg/dLHDLc

 SerPl-mCnc 43.0 mg/dLTrigl SerPl-mCnc 111.0 mg/dLGlucose SerPl-mCnc 102.0 mg/dL

 

                          2011 11:52 AM        Pap Smear Declined 

 

                          2011 11:28 AM        Lithium 2.080 mmol/LGLUCOSE 102.0 mg/dLSODIUM 135.0 mmol/LPOTASSIUM

 3.90 mmol/LCHLORIDE 106.0 mmol/LCO2 21.0 mmol/LBUN 12.0 mg/dLCREATININE 1.30 
mg/dLCALCIUM 10.70 mg/dLAGE 60 GFR NonAA 42 GFR AA 51 eGFR 42 eGFR AA* 51 

 

                          2011 8:58 AM          Lithium 0.690 mmol/L

 

                          2011 2:38 PM         VITAMIN B12 3483.0 pg/mL

 

                          2013 3:35 PM          WBC 5.1 RBC 3.73 HGB 11.70 g/dLHCT 36.40 %MCV 98.0 fLMCH 31.40

 pgMCHC 32.10 g/dLRDW SD 47 RDW CV 13.10 %MPV 9.80 fLPLT 224 NRBC# 0.00 NRBC% 
0.0 %NEUT 66.80 %%LYMP 19.10 %%MONO 9.0 %%EOS 4.90 %%BASO 0.20 %#NEUT 3.42 #LYMP
 0.98 #MONO 0.46 #EOS 0.25 #BASO 0.01 MANUAL DIFF NOT IND GLUCOSE 88.0 
mg/dLSODIUM 141.0 mmol/LPOTASSIUM 4.10 mmol/LCHLORIDE 110.0 mmol/LCO2 22.0 
mmol/LBUN 22.0 mg/dLCREATININE 1.10 mg/dLCALCIUM 9.80 mg/dLAGE 62 GFR NonAA 50 
GFR AA 61 eGFR 50 eGFR AA* 60 Lithium 0.760 mmol/L

 

                          2013 11:02 AM        TRIGLYCERIDES 106.0 mg/dLCHOLESTEROL 181.0 mg/dLHDL 46.0 mg/dLTOT

 CHOL/HDL 3.9 LDL (CALC) 114.0 mg/dLVITAMIN D 41.10 ng/mL

 

                          10/17/2014 10:10 AM       WBC 5.0 RBC 3.66 HGB 11.60 g/dLHCT 36.80 %.0 fLMCH 31.70

 pgMCHC 31.50 g/dLRDW SD 50 RDW CV 13.50 %MPV 10.10 fLPLT 209 NRBC# 0.00 NRBC% 
0.0 %NEUT 69.20 %%LYMP 21.0 %%MONO 6.40 %%EOS 3.20 %%BASO 0.20 %#NEUT 3.46 #LYMP
 1.05 #MONO 0.32 #EOS 0.16 #BASO 0.01 MANUAL DIFF NOT IND GLUCOSE 100.0 
mg/dLSODIUM 148.0 mmol/LPOTASSIUM 3.90 mmol/LCHLORIDE 114.0 mmol/LCO2 26.0 
mmol/LBUN 12.0 mg/dLCREATININE 1.20 mg/dLSGOT/AST 9.0 IU/LSGPT/ALT <6 IU/LALK 
PHOS 82.0 IU/LTOTAL PROTEIN 6.90 g/dLALBUMIN 4.0 g/dLTOTAL BILI 0.40 
mg/dLCALCIUM 10.50 mg/dLAGE 64 GFR NonAA 45 GFR AA 55 eGFR 45 eGFR AA* 55 
TRIGLYCERIDES 96.0 mg/dLCHOLESTEROL 155.0 mg/dLHDL 38.0 mg/dLTOT CHOL/HDL 4.1 
LDL (CALC) 98.0 mg/dLLithium 0.850 mmol/LCancer Antigen (CA) 125 8.30 U/mL

 

                          2015 10:25 AM        Lithium 0.790 mmol/LWBC 4.8 RBC 3.44 HGB 11.0 g/dLHCT 35.20 

%.0 fLMCH 32.0 pgMCHC 31.30 g/dLRDW SD 53 RDW CV 14.0 %MPV 9.30 fLPLT 210
 NRBC# 0.00 NRBC% 0.0 %NEUT 70.80 %%LYMP 17.20 %%MONO 8.10 %%EOS 3.50 %%BASO 
0.40 %#NEUT 3.41 #LYMP 0.83 #MONO 0.39 #EOS 0.17 #BASO 0.02 MANUAL DIFF NOT IND 
TRIGLYCERIDES 107.0 mg/dLCHOLESTEROL 174.0 mg/dLHDL 43.0 mg/dLTOT CHOL/HDL 4.0 
LDL (CALC) 110.0 mg/dLGLUCOSE 90.0 mg/dLSODIUM 145.0 mmol/LPOTASSIUM 3.80 
mmol/LCHLORIDE 115.0 mmol/LCO2 24.0 mmol/LBUN 17.0 mg/dLCREATININE 1.30 
mg/dLSGOT/AST 18.0 IU/LSGPT/ALT 17.0 IU/LALK PHOS 56.0 IU/LTOTAL PROTEIN 6.70 
g/dLALBUMIN 3.90 g/dLTOTAL BILI 0.40 mg/dLCALCIUM 9.80 mg/dLAGE 64 GFR NonAA 41 
GFR AA 50 eGFR 41 eGFR AA* 50 

 

                          2015 8:50 AM        WBC 5.8 RBC 3.29 HGB 10.70 g/dLHCT 34.0 %.0 fLMCH 32.50

 pgMCHC 31.50 g/dLRDW SD 52 RDW CV 13.60 %MPV 9.60 fLPLT 223 NRBC# 0.00 NRBC% 
0.0 %NEUT 69.60 %%LYMP 18.90 %%MONO 8.50 %%EOS 2.80 %%BASO 0.20 %#NEUT 4.03 
#LYMP 1.09 #MONO 0.49 #EOS 0.16 #BASO 0.01 MANUAL DIFF NOT IND Lithium 0.620 
mmol/LGLUCOSE 83.0 mg/dLSODIUM 139.0 mmol/LPOTASSIUM 3.90 mmol/LCHLORIDE 109.0 
mmol/LCO2 22.0 mmol/LBUN 19.0 mg/dLCREATININE 1.40 mg/dLSGOT/AST 19.0 
IU/LSGPT/ALT 21.0 IU/LALK PHOS 55.0 IU/LTOTAL PROTEIN 6.50 g/dLALBUMIN 3.90 
g/dLTOTAL BILI 0.50 mg/dLCALCIUM 9.60 mg/dLAGE 65 GFR NonAA 38 GFR AA 46 eGFR 38
 eGFR AA* 46 TRIGLYCERIDES 121.0 mg/dLCHOLESTEROL 192.0 mg/dLHDL 51.0 mg/dLTOT 
CHOL/HDL 3.8 .0 mg/dLFREE T4 0.79 TSH 1.210 uIU/mLHemoglobin A1c 5.40 
%Estim. Avg Glu (eAG) 108 

 

                          3/15/2016 8:08 AM         VITAMIN B12 696.0 pg/mL

 

                          3/23/2016 8:26 AM         WBC 7.0 RBC 3.61 HGB 11.80 g/dLHCT 37.70 %.0 fLMCH 32.70

 pgMCHC 31.30 g/dLRDW SD 49 RDW CV 12.50 %MPV 10.0 fLPLT 207 NRBC# 0.00 NRBC% 
0.0 %NEUT 73.60 %%LYMP 16.40 %%MONO 6.60 %%EOS 3.0 %%BASO 0.30 %#NEUT 5.15 #LYMP
 1.15 #MONO 0.46 #EOS 0.21 #BASO 0.02 MANUAL DIFF NOT IND Lithium 0.940 
mmol/LGLUCOSE 108.0 mg/dLSODIUM 143.0 mmol/LPOTASSIUM 4.30 mmol/LCHLORIDE 110.0 
mmol/LCO2 27.0 mmol/LBUN 16.0 mg/dLCREATININE 1.60 mg/dLSGOT/AST 13.0 
IU/LSGPT/ALT 7.0 IU/LALK PHOS 71.0 IU/LTOTAL PROTEIN 6.80 g/dLALBUMIN 4.0 
g/dLTOTAL BILI 0.20 mg/dLCALCIUM 10.40 mg/dLAGE 65 GFR NonAA 32 GFR AA 39 eGFR 
32 eGFR AA* 39 TRIGLYCERIDES 113.0 mg/dLCHOLESTEROL 169.0 mg/dLHDL 42.0 mg/dLTOT
 CHOL/HDL 4.0 LDL (CALC) 104.0 mg/dLFREE T4 0.86 TSH 2.20 uIU/mLHemoglobin A1c 
5.20 %Estim. Avg Glu (eAG) 103 

 

                          3/25/2016 9:17 AM         COLOR YELLOW APPEARANCE CLEAR SPEC GRAV 1.010 pH 7.0 PROTEIN 

NEGATIVE GLUCOSE NEGATIVE mg/dLKETONE NEGATIVE BILIRUBIN NEGATIVE BLOOD NEGATIVE
 NITRITE NEGATIVE [place] SCREEN SMALL MICRO IND? SEE BELOW WBC/HPF 0-5 RBC/HPF 
NEGATIVE CASTS/LPF NEGATIVE /LPFCRYSTALS NEGATIVE MUCOUS THRDS NEGATIVE BACTERIA
 NEGATIVE EPITH CELLS FEW SQUAMOUS /HPFTRICHOMONAS NEGATIVE YEAST NEGATIVE 

 

                          2016 6:00 AM        GLUCOSE 91.0 mg/dLSODIUM 143.0 mmol/LPOTASSIUM 3.60 mmol/LCHLORIDE

 112.0 mmol/LCO2 23.0 mmol/LBUN 22.0 mg/dLCREATININE 1.20 mg/dLSGOT/AST 15.0 
IU/LSGPT/ALT 12.0 IU/LALK PHOS 61.0 IU/LTOTAL PROTEIN 5.40 g/dLALBUMIN 3.10 
g/dLTOTAL BILI 0.40 mg/dLCALCIUM 8.40 mg/dLAGE 66 GFR NonAA 45 GFR AA 55 eGFR 45
 eGFR AA* 55 WBC 3.0 RBC 3.05 HGB 9.80 g/dLHCT 32.10 %.0 fLMCH 32.10 
pgMCHC 30.50 g/dLRDW SD 54 RDW CV 14.20 %MPV 10.10 fLPLT 170 NRBC# 0.00 NRBC% 
0.0 %NEUT 50.70 %%LYMP 32.60 %%MONO 10.50 %%EOS 5.90 %%BASO 0.30 %#NEUT 1.54 
#LYMP 0.99 #MONO 0.32 #EOS 0.18 #BASO 0.01 MANUAL DIFF NOT IND 

 

                          2016 2:09 PM        COLOR YELLOW APPEARANCE CLEAR SPEC GRAV 1.010 pH 5.0 PROTEIN

 30 GLUCOSE NEGATIVE mg/dLKETONE NEGATIVE BILIRUBIN NEGATIVE BLOOD LARGE NITRITE
 NEGATIVE [facility] SCREEN MODERATE MICRO INDICATED? SEE BELOW WBC/HPF  RBC/HPF
 20-50 CASTS/LPF NEGATIVE /LPFCRYSTALS NEGATIVE MUCOUS THRDS NEGATIVE BACTERIA 
NEGATIVE EPITH CELLS FEW SQUAMOUS /HPFTRICHOMONAS NEGATIVE YEAST NEGATIVE CULT 
SET UP? YES 

 

                          1/3/2017 4:08 PM          COLOR YELLOW APPEARANCE HAZY SPEC GRAV 1.015 pH 6.0 PROTEIN 30

 GLUCOSE NEGATIVE mg/dLKETONE NEGATIVE BILIRUBIN NEGATIVE BLOOD MODERATE NITRITE
 NEGATIVE [facility] SCREEN LARGE MICRO INDICATED? SEE BELOW WBC/-200 RBC/HPF 
5-10 CASTS/LPF NEGATIVE /LPFCRYSTALS NEGATIVE MUCOUS THRDS NEGATIVE BACTERIA 
NEGATIVE EPITH CELLS 1+ SQUAMOUS /HPFTRICHOMONAS NEGATIVE YEAST NEGATIVE CULT 
SET UP? YES 







History Of Immunizations







       Name    Date Admin    Mfg Name    Mfg Code    Trade Name    Lot#    Route    Inj    Vis Given    Vis

 Pub                                    CVX

 

        Influenza    2008    Not Entered    NE      Not Entered            Not Entered    Not Entered

                    1            999

 

        X       12/19/2008    [employer]    MSD     Pneumovax 23            Intramuscular    Not Entered

                    1            999

 

           Influenza    10/15/2010    [facility]    NOV        Fluvirin > 12 Years    

[id number]     Intramuscular    Left Deltoid    10/15/2010    2009    999

 

          X         3/23/2015    [facility]       Prevnar 13    [id number]    Intramuscular

                Right Gluteous Medius    3/23/2015       2013       109







History of Past Illness







                    Name                Date of Onset       Comments

 

                    Peritoneal Neoplasm, Malignant                         

 

                    Hyperlipidemia                           

 

                    Bipolar disorder, unspecified                         

 

                    Artificial opening status; colostomy                         

 

                    B12 deficiency                           

 

                    Anemia, Pernicious                         

 

                    Arthritis unspecified                         

 

                    cervical cancer                          

 

                    Artificial opening status; colostomy    2010  1:10PM     

 

                    Bipolar disorder, unspecified    2010  1:10PM     

 

                    Hyperlipidemia      2010  1:10PM     

 

                    Anemia, Pernicious    2010  1:10PM     

 

                    Postoperative Follow-Up    2010  1:55PM     

 

                    Postoperative Follow-Up    Mar  8 2010 10:57AM     

 

                    Artificial opening status; colostomy    Mar  8 2010  1:19PM     

 

                    Peritoneal Neoplasm, Malignant    Mar  8 2010  1:19PM     

 

                    Artificial opening status; colostomy    2010  1:40PM     

 

                    Hyperlipidemia      2010  1:40PM     

 

                    Anemia, Pernicious    2010  1:40PM     

 

                    Peritoneal Neoplasm, Malignant    2010  1:40PM     

 

                    Arthritis unspecified    2010  1:40PM     

 

                    Anemia of Chronic Illness    2010  1:40PM     

 

                    Tinea corporis      2010  3:17PM     

 

                    Bipolar disorder, unspecified    2010  1:33PM     

 

                    Hyperlipidemia      2010  1:33PM     

 

                    Anemia, Pernicious    2010  1:33PM     

 

                    Peritoneal Neoplasm, Malignant    2010  1:33PM     

 

                    B12 deficiency      2010  1:33PM     

 

                    Ethmoidal Sinusitis, Acute    Sep 21 2010  3:53PM     

 

                    Wheezing            Sep 21 2010  3:53PM     

 

                    Flu                 Oct 15 2010  1:40PM     

 

                    Bipolar disorder, unspecified    Oct 15 2010  1:42PM     

 

                    Hyperlipidemia      Oct 15 2010  1:42PM     

 

                    Anemia, Pernicious    Oct 15 2010  1:42PM     

 

                    Peritoneal Neoplasm, Malignant    Oct 15 2010  1:42PM     

 

                    Bipolar disorder, unspecified    2011 12:01PM     

 

                    Hyperlipidemia      2011 12:01PM     

 

                    Anemia, Pernicious    2011 12:01PM     

 

                    Peritoneal Neoplasm, Malignant    2011 12:01PM     

 

                    Bipolar disorder, unspecified    Apr 15 2011 10:55AM     

 

                    Major Depression    2011 10:11AM     

 

                    Bipolar Disorder    2011 10:11AM     

 

                    Cancer              May 10 2011  4:16PM     

 

                    Major Depression    May 10 2011  3:16PM     

 

                    Bipolar Disorder    May 10 2011  3:16PM     

 

                    Hypercalcemia       May 23 2011  2:47PM     

 

                    Bipolar disorder, unspecified    May 23 2011  2:47PM     

 

                    Colon Cancer, Personal History    May 23 2011  2:47PM     

 

                    Bipolar Disorder    May 31 2011  4:39PM     

 

                    Depressive Disorder    2011 10:01AM     

 

                    Vitamin B12 deficiency    2011 10:01AM     

 

                    Vitamin D Deficiency    2011  5:07PM     

 

                    Anemia, Vitamin B12 Deficiency    2011  5:07PM     

 

                    B12 deficiency      2011  3:56PM     

 

                    Routine gynecological examination    Aug  4 2011  9:08AM     

 

                    Screening Examination for Breast Cancer    Aug  4 2011  9:08AM     

 

                    Tinea Corporis      Aug  4 2011  9:08AM     

 

                    Depressive Disorder    Sep 23 2011  8:47AM     

 

                    Contact Dermatitis    Sep 23 2011  8:47AM     

 

                    Anemia, Pernicious    Sep 23 2011  8:47AM     

 

                    B12 deficiency      Sep 23 2011  8:47AM     

 

                    B12 deficiency      Sep 27 2011  2:58PM     

 

                    B12 deficiency      Oct 20 2011  2:34PM     

 

                    Flu                 Dec  9 2011  3:16PM     

 

                    B12 deficiency      Dec  9 2011  3:17PM     

 

                    B12 deficiency      2012  4:52PM     

 

                    B12 deficiency      2012 11:10AM     

 

                    B12 deficiency      2012  3:37PM     

 

                    B12 deficiency      May  3 2012  4:10PM     

 

                    B12 deficiency      2012  2:54PM     

 

                    B12 deficiency      2012 11:23AM     

 

                    B12 deficiency      Aug  9 2012  2:08PM     

 

                    B12 deficiency      Sep  6 2012  4:36PM     

 

                    B12 deficiency      Oct 16 2012 10:23AM     

 

                    Flu                 Feb  2013  3:11PM     

 

                    Bipolar disorder, unspecified    Feb  2013  2:48PM     

 

                    Anemia, Pernicious    Feb  4   2:48PM     

 

                    B12 deficiency      2013  2:48PM     

 

                    Extrapyramidal abnormal movement disorder    2013  2:48PM     

 

                    B12 deficiency      Apr  3 2013 12:03PM     

 

                    Bipolar disorder, unspecified    May  7 2013  1:31PM     

 

                    Anemia, Pernicious    May  7 2013  1:31PM     

 

                    B12 deficiency      May  7 2013  1:31PM     

 

                    Extrapyramidal abnormal movement disorder    May  7 2013  1:31PM     

 

                    B12 deficiency      2013  3:42PM     

 

                    B12 deficiency      2013  1:31PM     

 

                    Hyperlipidemia      Aug  7 2013 10:37AM     

 

                    Vitamin D Deficiency    Aug  7 2013 10:37AM     

 

                    Bipolar disorder, unspecified    Aug  7 2013 10:37AM     

 

                    Anemia, Pernicious    Aug  7 2013 10:37AM     

 

                    B12 deficiency      Aug  7 2013 10:37AM     

 

                    B12 deficiency      Sep 25 2013 11:15AM     

 

                    B12 deficiency      Dec 11 2013  3:16PM     

 

                    B12 deficiency      Mar  6 2014  1:48PM     

 

                    B12 deficiency      May 21 2014  3:17PM     

 

                    Screening Examination for Breast Cancer    2014  3:23PM     

 

                    Periumbilical abdominal pain    2014  3:23PM     

 

                    B12 deficiency      Jul 10 2014  2:52PM     

 

                    Anemia, Vitamin B12 Deficiency    Aug 13 2014  4:50PM     

 

                    Bipolar disorder    Oct 16 2014 11:13AM     

 

                    Hyperlipidemia      Oct 16 2014 11:13AM     

 

                    Anemia, Pernicious    Oct 16 2014 11:13AM     

 

                    Peritoneal Neoplasm, Malignant    Oct 16 2014 11:13AM     

 

                    Screening breast examination    Oct 16 2014 11:13AM     

 

                    Weight loss         Oct 16 2014 11:13AM     

 

                    Anemia, Pernicious    Mar 23 2015  2:57PM     

 

                    B12 deficiency      Mar 23 2015  2:57PM     

 

                    Need for Prevnar vaccine    Mar 23 2015  2:57PM     

 

                    Bipolar disorder    Mar 23 2015  2:57PM     

 

                    Hyperlipidemia      Mar 23 2015  2:57PM     

 

                    Anemia, Pernicious    Mar 23 2015  2:57PM     

 

                    Peritoneal Neoplasm, Malignant    Mar 23 2015  2:57PM     

 

                    B12 deficiency      May  4 2015  4:48PM     

 

                    Hyperlipidemia      May 13 2015  9:56AM     

 

                    Anemia              May 13 2015  9:56AM     

 

                    Bipolar disorder    May 13 2015  9:56AM     

 

                    Bipolar disorder    May 14 2015  3:27PM     

 

                    Hyperlipidemia      May 14 2015  3:27PM     

 

                    Anemia, Pernicious    May 14 2015  3:27PM     

 

                    Peritoneal Neoplasm, Malignant    May 14 2015  3:27PM     

 

                    B12 deficiency      2015  2:20PM     

 

                    B12 deficiency      2015 11:34AM     

 

                    B12 deficiency      Aug 18 2015  9:06AM     

 

                    Tinea Corporis      Sep 18 2015  8:54AM     

 

                    B12 deficiency      Sep 18 2015  8:54AM     

 

                    B12 deficiency      2015 10:28AM     

 

                    Herpes zoster without complication    Dec  3 2015  9:52AM     

 

                    B12 deficiency      Dec 23 2015 11:21AM     

 

                    B12 deficiency      2016  4:51PM     

 

                    Vitamin B 12 deficiency    Mar 14 2016  5:35PM     

 

                    B12 deficiency      Mar 15 2016 12:14PM     

 

                    B12 deficiency      May  5 2016 11:30AM     

 

                    Edema               May  5 2016 11:30AM     

 

                    Dermatitis          May  5 2016 11:30AM     

 

                    Edema               May 17 2016  8:38AM     

 

                    Shortness of breath    May 17 2016  8:38AM     

 

                    Bilateral edema of lower extremity    2016  2:06PM     

 

                    B12 deficiency      2016  2:06PM     

 

                    B12 deficiency      2016 11:50AM     

 

                    B12 deficiency      2016 11:20AM     

 

                    Diarrhea            Aug  2 2016  3:13PM     

 

                    B12 deficiency      Aug 24 2016 11:10AM     

 

                    Encounter for screening mammogram for breast cancer    Aug 24 2016 11:44AM     

 

                    B12 deficiency      Sep 28 2016  2:35PM     

 

                    B12 deficiency      Dec 15 2016  2:02PM     

 

                    Dysuria             Dec 29 2016 12:14PM     

 

                    Hematuria           Fidencio  3 2017  1:33PM     

 

                    Constipation by delayed colonic transit    2017  1:52PM     

 

                    Ileus               2017  1:52PM     

 

                    UTI (urinary tract infection)    Fidencio 15 2017  3:39PM     

 

                    Acute cystitis with hematuria    2017 11:07AM     

 

                    B12 deficiency      2017 11:07AM     

 

                    B12 deficiency      2017 11:40AM     







Payers







           Insurance Name    Company Name    Plan Name    Plan Number    Policy Number    Policy Group

 Number                                 Start Date

 

                    Medicare RHC Medicare RHC              [id number]              N/A

 

                          [employer]    [employer]                 [id number]

                                                    

 

                    Medicare Part A    Medicare - Lab/Xray              [id number]              2006

 

                    Medicare Part B    Medicare [place]              [id number]              2006

 

                          [employer]    [employer]                 50 percent

                                                    2009

 

                    [employer]              [id number]              N/A

 

                    Medicare Part A    Medicare Part A              [id number]              N/A

 

                    Medicare Part A    Medicare Part A              [id number]              2006









History of Encounters







                    Visit Date          Visit Type          Provider

 

                    2017           Office visit        Radha GREEN

 

                    1/15/2017           Office visit        Aj Tapia NP

 

                    2017            Office visit        Devin Masterson MD

 

                    2016          [facility]            Devin Masterson MD

 

                    12/15/2016          Nurse visit         Bhupinder Louise DO

 

                    2016           Nurse visit         Bret Forte APRN

 

                    2016           Nurse visit         Bhupinder Aspen DO

 

                    2016            Office visit        Bhupinder Aspen DO

 

                    2016           Nurse visit         Bhupinder Aspen DO

 

                    2016           Office visit        Bret Reanna APRN

 

                    2016            Office visit        Bhupinder Aspen DO

 

                    3/15/2016           Nurse visit         Bhupinder Aspen DO

 

                    2016            Nurse visit         Bhupinder Aspen DO

 

                    2015          Nurse visit         Bhupinder Aspen DO

 

                    12/3/2015           Office visit        Bhupinder Aspen DO

 

                    2015          Nurse visit         Bhupinder Aspen DO

 

                    2015           Office visit        Bhupinder Aspen DO

 

                    2015           Nurse visit         Bhupinder Aspen DO

 

                    2015            Nurse visit         Bhupinder Aspen DO

 

                    2015            Nurse visit         Bhupinder Aspen DO

 

                    2015           Office visit        Bhupinder Aspen DO

 

                    2015            Nurse visit         Bhupinder Aspen DO

 

                    3/23/2015           Office visit        Bhupinder Aspen DO

 

                    10/16/2014          Office visit        Bhupinder Aspen DO

 

                    2014           Nurse visit         Radha Ontiveros APRN

 

                    7/10/2014           Nurse visit         Bhupinder Aspen DO

 

                    2014           Office visit        Bhupinder Aspen DO

 

                    2014           Nurse visit         Bhupinder Aspen DO

 

                    3/6/2014            Nurse visit         Bhupinder Aspen DO

 

                    2014            [facility]            EARNEST Lopez MD

 

                    2013          Nurse visit         Bhupinder Aspen DO

 

                    2013           Nurse visit         Bhupinder Aspen DO

 

                    2013            Office visit        Bhupinder Aspen DO

 

                    2013            Nurse visit         Bhupinder Aspen DO

 

                    2013            Nurse visit         Bhupinder Aspen DO

 

                    2013            Office visit        Bhupinder Aspen DO

 

                    4/3/2013            Nurse visit         Bhupinder Aspen DO

 

                    2013            Office visit        Bhupinder Aspen DO

 

                    10/16/2012          Nurse visit         Bhupinder Aspen DO

 

                    2012            Nurse visit         Bhupinder Aspen DO

 

                    2012            Voided              Bhupinder Aspen DO

 

                    2012            Nurse visit         Bhupinder Aspen DO

 

                    2012            Nurse visit         Bhupinder Aspen DO

 

                    2012           Nurse visit         Bhupinder Aspen DO

 

                    5/3/2012            Nurse visit         Bhupinder Aspen DO

 

                    2012           Nurse visit         Bhupinder Aspen DO

 

                    2012           Nurse visit         Bhupinder Aspen DO

 

                    2012           Nurse visit         Bhupinder Aspen DO

 

                    2011           Nurse visit         Bhupinder Aspen DO

 

                    10/20/2011          Nurse visit         Bhupinder Aspen DO

 

                    2011           Office visit        Bhupinder Aspen DO

 

                    2011           Nurse visit         Radha GREEN

 

                    2011            Office visit        Bhupinder Aspen DO

 

                    2011           Nurse visit         Bhupinder Aspen DO

 

                    2011            Office visit        Bhupinder Aspen DO

 

                    2011           Office visit        Bhupinder Aspen DO

 

                    5/10/2011           Office visit        Bhupinder Aspen DO

 

                    2011           Office visit        Bhupinder Aspen DO

 

                    4/15/2011           Office visit        Devin Angel DO

 

                    2011           Office visit        Devin Angel DO

 

                    10/15/2010          Office visit        Devin Angel DO

 

                    2010           Office visit        Devin Angel DO

 

                    2010            Office visit        Devin Angel DO

 

                    2010           Office visit        Devin Angel DO

 

                    2010            Office visit        Devin Angel DO

 

                    3/8/2010            Office visit        Devin Masterson MD

 

                    2010            Surgery             Devin Masterson MD

 

                    2010            Office visit        Devin Angel DO

 

                    2010           Surgery             Devin Masterson MD

 

                    2010           Hospital            Devin Masterson MD

 

                    2010           [facility]            Devin Masterson MD

 

                    10/22/2009          Office visit        Devin Angel DO

## 2019-06-26 NOTE — XMS REPORT
MU2 Ambulatory Summary

                             Created on: 2016



Pauline Gan

External Reference #: [id number]

: 1950

Sex: Female



Demographics







                          Address                   [address]

[place]  [address]

 

                          Home Phone                [phone]

 

                          Preferred Language        English

 

                          Marital Status            Legally 

 

                          Holiness Affiliation     Unknown

 

                          Race                      White

 

                          Ethnic Group              Not  or 





Author







                          Author                    Bret Forte

 

                          [facility]

 

                          Address                   [address]

[place]  [id number]



 

                          Phone                     [phone]







Care Team Providers







                    Care Team Member Name    Role                Phone

 

                    Bret Forte    PCP                 [phone]







Allergies and Adverse Reactions







                    Name                Reaction            Notes

 

                    NO KNOWN DRUG ALLERGIES                         







Plan of Treatment







             Planned Activity    Comments     Planned Date    Planned Time    Plan/Goal

 

             COMPLETE CBC W/AUTO DIFF WBC                 2013     12:00 AM      

 

             ASSAY OF LITHIUM                 2013     12:00 AM      

 

             METABOLIC PANEL TOTAL CA                 2013     12:00 AM      

 

             VITAMIN B-12                 2013     12:00 AM      

 

             ASSAY OF FOLIC ACID SERUM                 2013     12:00 AM      

 

             THER/PROPH/DIAG INJ SC/IM                 2013    12:00 AM      







Medications







                                        Active 

 

             Name         Start Date    Estimated Completion Date    SIG          Comments

 

                Latuda 20 mg oral tablet                                    take 1 tablet (20 mg) by oral route once daily with

 food (at least 350 calories)            

 

             pravastatin 40 mg oral tablet    3/30/2015                 TAKE 1 TABLET BY MOUTH DAILY     

 

                Namenda XR 28 mg oral capsule,sprinkle,ER 24hr    2015                       take 1 capsule (28

 mg) by oral route once daily            

 

                Namenda XR 28 mg oral capsule,sprinkle,ER 24hr    2016                       take 1 capsule (28

 mg) by oral route once daily            

 

                triamcinolone acetonide 0.1 % topical cream    2016                        apply a thin layer to 

the affected area(s) by topical route 2 times per day     

 

                furosemide 40 mg oral tablet    2016      take 1 tablet (40 mg) by oral

 route once daily                        

 

                potassium chloride 10 mEq oral tablet extended release    2016                       take 1 tablet

 (10 meq) by oral route once daily       

 

             pravastatin 40 mg oral tablet    2016                 TAKE 1 TABLET BY MOUTH DAILY     









                                         

 

             Name         Start Date    Expiration Date    SIG          Comments

 

             Reglan 10mg    3/29/2010    2010    one ac and hs     

 

                Keflex 500 mg oral capsule    2010       10/1/2010       take 1 capsule (500 mg) by oral

 route every 6 hours for 10 days         

 

                Bactrim -160 mg oral tablet    2011       take 1 tablet by oral route

 every 12 hours for 7 days               

 

                triamcinolone acetonide 0.1 % topical cream    2011      apply a thin

 layer to the affected area(s) by topical route 2 times per day     

 

                sertraline 100 mg oral tablet    4/10/2012       5/10/2012       take 1.5 tablets by oral route

 daily for 30 days                       

 

                ergocalciferol (vitamin D2) 50,000 unit oral capsule    4/15/2013       2013       TAKE

 ONE CAPSULE BY MOUTH ONCE A WEEK        

 

                CYANOCOBALAM 1000MCGINJ 1000 milliliter    2013       INJECT 1ML INTRAMUSCULAR

 ONCE A MONTH                            

 

                pravastatin 40 mg oral tablet    3/25/2014       3/20/2015       TAKE ONE TABLET BY MOUTH EVERY

 DAY                                     

 

                          Zostavax (PF) 19,400 unit/0.65 mL subcutaneous suspension for reconstitution    3/23/2015

                    3/24/2015           inject 0.65 milliliter by subcutaneous route once     

 

                famciclovir 500 mg oral tablet    12/3/2015       12/10/2015      take 1 tablet (500 mg) by

 oral route every 8 hours for 7 days     

 

                Cipro 500 mg oral tablet    2016       take 1 tablet (500 mg) by oral route

 2 times per day for 5 days              









                                        Discontinued 

 

             Name         Start Date    Discontinued Date    SIG          Comments

 

                Tylenol 325 mg oral tablet                    2013        take 1 - 2 tablets (325 -650 mg) by oral

 route every 4-6 hours as needed         

 

                Calcium 600 + D(3) 600 mg(1,500mg) -400 unit oral tablet                    2011       take 1 tablet

 by oral route 2 times a day            no longer taking

 

                Vitamin B-12 1,000 mcg oral tablet extended release    2010       take 1

 tablet by oral route daily             no longer taking

 

                Antifungal (clotrimazole) 1 % topical cream    2010       apply to the 

affected and surrounding areas of skin by topical route 2 times per day morning 
and evening                              

 

                sertraline 100 mg oral tablet    5/10/2011       2011       take 2 tablets (200 mg) by 

oral route once daily                   discontinued by Dr. Zach martineztazapine 15 mg oral tablet                    2011        take 1 tablet (15 mg) by oral route 

once daily before bedtime               Dr. Serrano

 

                mirtazapine 15 mg oral tablet                    2011        take 1 tablet (15 mg) by oral route 

once daily before bedtime               dc'd by Dr. Serrano

 

                Pristiq 50 mg oral tablet extended release 24 hr                    2013        take 1 tablet (50

 mg) by oral route once daily           Dr. Serrano

 

                Pristiq 50 mg oral tablet extended release 24 hr                    2013        take 1 tablet (50

 mg) by oral route once daily           dose updated

 

                Vitamin B-12 1,000 mcg/mL injection solution    2011        inject 1 milliliter

 (1,000 mcg) by intramuscular route once a month    on list already

 

                    syringe with needle 1 mL 25 gauge x 1" miscellaneous syringe    2011

                          use for injection once a month     

 

                clotrimazole 1 % topical cream    2011        apply to the affected and surrounding

 areas of skin by topical route 2 times per day in the morning and evening     

 

                Vitamin D2 50,000 unit oral capsule    2011        take 1 capsule (50,000

 unit) by oral route once weekly        generic on list

 

                Pravachol 40 mg oral tablet    2012        take 1 tablet (40 mg) by oral 

route once daily for 90 days            generic on list

 

                lithium carbonate 300 mg oral capsule    2012        take 1 capsule by oral

 route daily                            dose updated

 

                Pristiq 100 mg oral tablet extended release 24 hr                    4/10/2012       take 1 and 1/2 

tablet (150 mg) by oral route once daily    Mental Health provider

 

                Pristiq 100 mg oral tablet extended release 24 hr                    4/10/2012       take 1 and 1/2 

tablet (150 mg) by oral route once daily    Discontinued by Dr Efrain [facility] at [facility]

 

                hydroxyzine HCl 50 mg oral tablet    10/16/2014      2015       take 1 tablet (50 mg) 

by oral route at bedtime                 

 

                lithium carbonate 300 mg oral capsule    2015       take 1 capsule (300

 mg) by oral route 2 for 30 days         

 

                fluconazole 100 mg oral tablet    2015       12/3/2015       take 1 tablet (100 mg) by 

oral route once a week                   

 

                ketoconazole 2 % topical cream    2015       12/3/2015       apply to the affected area(s)

 by topical route 2 times per day        

 

                prednisone 10 mg oral tablet    12/3/2015       2016        take 2 tablets (20 mg) by oral

 route once daily for 4 days 1 tablet daily for 4 days 0.5 tablet daily for 4 
days                                     







Problem List







                    Description         Status              Onset

 

                    Artificial opening status; colostomy    Active               

 

                    Bipolar disorder, unspecified    Active               

 

                    Hyperlipidemia      Active               

 

                    Peritoneal Neoplasm, Malignant    Active               

 

                    Anemia, Pernicious    Active               

 

                    Arthritis unspecified    Active               

 

                    B12 deficiency      Active               







Vital Signs







      Date    Time    BP-Sys(mm[Hg]    BP-Lynn(mm[Hg])    HR(bpm)    RR(rpm)    Temp    WT    HT    HC    BMI

                    BSA                 BMI Percentile      O2 Sat(%)

 

        2016    2:04:00 PM    142 mmHg    86 mmHg    68 bpm    16 rpm    98.5 F    166 lbs    63 in

                          29.41 kg/m2    1.83 m2                   100 %

 

        2016    11:27:00 AM    148 mmHg    78 mmHg    90 bpm    20 rpm    98.2 F    153 lbs    69 in

                          22.5939 kg/m    1.8381 m                 96 %

 

        12/3/2015    9:50:00 AM    132 mmHg    70 mmHg    62 bpm    16 rpm    97.9 F    145 lbs    69 in

                          21.41 kg/m2    1.79 m2                   100 %

 

        2015    8:52:00 AM    132 mmHg    68 mmHg    52 bpm    20 rpm    97.8 F    141 lbs    69 in

                          20.8218 kg/m    1.7645 m                 100 %

 

        2015    3:25:00 PM    120 mmHg    62 mmHg    72 bpm    16 rpm    98.1 F    136 lbs    69 in

                          20.08 kg/m2    1.73 m2                   98 %

 

       3/23/2015    2:55:00 PM    130 mmHg    76 mmHg    68 bpm    18 rpm    97 F    140 lbs    69 in    

                20.6742 kg/m    1.7583 m                      98 %

 

        10/16/2014    11:11:00 AM    120 mmHg    66 mmHg    77 bpm    20 rpm    98 F    130 lbs    69 in

                          19.20 kg/m2    1.69 m2                   100 %

 

        2014    3:21:00 PM    130 mmHg    66 mmHg    63 bpm    18 rpm    97.2 F    160 lbs    69 in

                          23.6276 kg/m    1.8797 m                 99 %

 

        2013    10:35:00 AM    132 mmHg    70 mmHg    66 bpm    20 rpm    98.1 F    157 lbs    69 in

                          23.18 kg/m2    1.86 m2                    

 

        2013    1:29:00 PM    132 mmHg    70 mmHg    76 bpm    18 rpm    98.2 F    166 lbs    69 in 

                          24.5137 kg/m    1.9146 m                  

 

       2013    2:46:00 PM    128 mmHg    70 mmHg    76 bpm    16 rpm    98 F    160 lbs    69 in     

                23.63 kg/m2     1.88 m2                          

 

        2011    8:49:00 AM    128 mmHg    78 mmHg    70 bpm    18 rpm    97.9 F    164 lbs    69 in

                          24.2183 kg/m    1.903 m                  

 

     2011    1:31:00 PM    132 mmHg    68 mmHg    84 bpm         97 F    167 lbs                        

                                         

 

        2011    9:09:00 AM    128 mmHg    70 mmHg    72 bpm    18 rpm    98.2 F    163 lbs    64 in 

                          27.9786 kg/m    1.8272 m                  

 

       2011    10:01:00 AM    132 mmHg    70 mmHg    72 bpm    18 rpm    98.2 F    154 lbs             

                                                                 

 

       2011    2:47:00 PM    128 mmHg    70 mmHg    72 bpm    18 rpm    97.8 F    156 lbs             

                                                                 

 

       5/10/2011    3:16:00 PM    144 mmHg    80 mmHg    72 bpm    18 rpm    98.2 F    158 lbs             

                                                                 

 

        2011    10:11:00 AM    132 mmHg    70 mmHg    70 bpm    18 rpm    98.2 F    168 lbs    69 in

                          24.809 kg/m    1.9261 m                  

 

        4/15/2011    10:52:00 AM    110 mmHg    60 mmHg    75 bpm    16 rpm    97.5 F    172.375 lbs    

69 in                     25.46 kg/m2    1.95 m2                   100 %

 

        2011    11:43:00 AM    120 mmHg    82 mmHg    75 bpm    16 rpm    97.2 F    178.5 lbs    69

 in                       26.3596 kg/m    1.9854 m                 100 %

 

        10/15/2010    1:32:00 PM    120 mmHg    70 mmHg    80 bpm    18 rpm    96.6 F    177 lbs    69 in

                          26.14 kg/m2    1.98 m2                   100 %

 

        2010    3:50:00 PM    168 mmHg    100 mmHg    82 bpm    18 rpm    97.8 F    177.5 lbs    69

 in                       26.2119 kg/m    1.9798 m                 97 %

 

        2010    1:21:00 PM    140 mmHg    80 mmHg    59 bpm    16 rpm    97.6 F    173.25 lbs    69 

in                        25.58 kg/m2    1.96 m2                   100 %

 

        2010    3:02:00 PM    140 mmHg    80 mmHg    61 bpm    16 rpm    97.6 F    173.125 lbs    69

 in                       25.5658 kg/m    1.9553 m                 99 %

 

        2010    1:23:00 PM    130 mmHg    80 mmHg    66 bpm    16 rpm    96.8 F    173 lbs    69 in 

                          25.55 kg/m2    1.95 m2                   100 %

 

        2010    12:58:00 PM    130 mmHg    88 mmHg    75 bpm    16 rpm    98.4 F    172.25 lbs    69

 in                       25.4366 kg/m    1.9503 m                 100 %







Social History







                    Name                Description         Comments

 

                    denies alcohol use                         

 

                    denies smoking                           

 

                    Denies illicit substance abuse                         

 

                    retired                                 direct care

 

                    Single                                   

 

                    Exercises regularly                         

 

                    Attended some college                         

 

                    Tobacco             Never smoker         







History of Procedures







                    Date Ordered        Description         Order Status

 

                    2010 12:00 AM    COMPREHEN METABOLIC PANEL    Reviewed

 

                    2010 12:00 AM    COMPLETE CBC W/AUTO DIFF WBC    Reviewed

 

                    2010 12:00 AM    LIPID PANEL         Reviewed

 

                          2015 12:00 AM        B12 Injection, Up to 1000 Mcg NDC#2477-4736-14 [facility] 

                                        Reviewed

 

                    2011 12:00 AM    MAMMOGRAM SCREENING    Reviewed

 

                    2011 12:00 AM    CYTOPATH C/V THIN LAYER    Reviewed

 

                    2011 12:00 AM    B12 Injection 1 cc NDC#[address]-53    Reviewed

 

                    2015 12:00 AM    THER/PROPH/DIAG INJ SC/IM    Reviewed

 

                    2015 12:00 AM    B12 Injection, Up to 1000 Mcg NDC#3046-4630-97    Reviewed

 

                    2011 12:00 AM    THER/PROPH/DIAG INJ SC/IM    Reviewed

 

                    2011 12:00 AM    B12 Injection([facility]) Ndc#5147-8085-84-    Reviewed

 

                    2015 12:00 AM    THER/PROPH/DIAG INJ SC/IM    Reviewed

 

                    2015 12:00 AM    B12 Injection, Up to 1000 Mcg NDC#5320-1106-51    Reviewed

 

                    10/20/2011 12:00 AM    THER/PROPH/DIAG INJ SC/IM    Reviewed

 

                    10/20/2011 12:00 AM    B12 Injection([facility]) Ndc#3430-8689-60-    Reviewed

 

                    2016 12:00 AM    THER/PROPH/DIAG INJ SC/IM    Reviewed

 

                    2016 12:00 AM    B12 Injection, Up to 1000 Mcg NDC#1622-0015-01    Reviewed

 

                    3/14/2016 12:00 AM    VITAMIN B-12        Reviewed

 

                    3/15/2016 12:00 AM    THER/PROPH/DIAG INJ SC/IM    Reviewed

 

                    3/15/2016 12:00 AM    B12 Injection, Up to 1000 Mcg NDC#3024-2363-62    Reviewed

 

                    2011 12:00 AM    ***Immunization administration, Medicare flu    Reviewed

 

                    2011 12:00 AM    Fluzone ** MEDICARE Only **    Reviewed

 

                    2011 12:00 AM    THER/PROPH/DIAG INJ SC/IM    Reviewed

 

                    2011 12:00 AM    B12 Injection ([facility]) Ndc#2686-8743-53    Reviewed

 

                    2016 12:00 AM    B12 Injection, Up to 1000 Mcg NDC#1879-5089-98 [facility]    

Reviewed

 

                    2016 12:00 AM    TTE W/DOPPLER COMPLETE    Reviewed

 

                    2016 12:00 AM    EXTREMITY STUDY     Returned

 

                          2016 12:00 AM        B12 Injection, Up to 1000 Mcg NDC#2024-6048-30 [facility] 

                                        Reviewed

 

                    2016 12:00 AM    THER/PROPH/DIAG INJ SC/IM    Reviewed

 

                    2016 12:00 AM    THER/PROPH/DIAG INJ SC/IM    Reviewed

 

                    2016 12:00 AM    B12 Injection, Up to 1000 Mcg NDC#4082-4570-25    Reviewed

 

                    2012 12:00 AM    THER/PROPH/DIAG INJ SC/IM    Reviewed

 

                    2012 12:00 AM    B12 Injection ([employer] [facility]) Ndc#1904-1214-44    Reviewed

 

                    2012 12:00 AM    THER/PROPH/DIAG INJ SC/IM    Reviewed

 

                    2012 12:00 AM    B12 Injection([place]) Ndc#7210-4737-33-    Reviewed

 

                    5/3/2012 12:00 AM    THER/PROPH/DIAG INJ SC/IM    Reviewed

 

                    5/3/2012 12:00 AM    B12 Injection([facility]) Ndc#0655-8079-21-    Reviewed

 

                    2012 12:00 AM    IMMUNOTHERAPY INJECTIONS    Reviewed

 

                    2012 12:00 AM    B12 Injection([facility]) Ndc#1012-0267-85-    Reviewed

 

                    2012 12:00 AM    THER/PROPH/DIAG INJ SC/IM    Reviewed

 

                    2012 12:00 AM    B12 Injection, Up to 1000 Mcg NDC#1635-7948-14    Reviewed

 

                    2012 12:00 AM    THER/PROPH/DIAG INJ SC/IM    Reviewed

 

                    2012 12:00 AM    B12 Injection, Up to 1000 Mcg NDC#5073-5266-63    Reviewed

 

                    2012 12:00 AM    THER/PROPH/DIAG INJ SC/IM    Reviewed

 

                    2012 12:00 AM    B12 Injection, Up to 1000 Mcg NDC#0016-3711-78    Reviewed

 

                    10/16/2012 12:00 AM    THER/PROPH/DIAG INJ SC/IM    Reviewed

 

                    10/16/2012 12:00 AM    B12 Injection, Up to 1000 Mcg NDC#3336-5196-81    Reviewed

 

                    2010 12:00 AM    COMPREHEN METABOLIC PANEL    Reviewed

 

                    2010 12:00 AM    COMPLETE CBC W/AUTO DIFF WBC    Reviewed

 

                    2010 12:00 AM    LIPID PANEL         Reviewed

 

                    2013 12:00 AM    Flu Injection 3 Years And Above NDC# [address]-69  [facility]    Reviewed



 

                    2013 12:00 AM    COMPLETE CBC W/AUTO DIFF WBC    Reviewed

 

                    2013 12:00 AM    ASSAY OF LITHIUM    Reviewed

 

                    2013 12:00 AM    METABOLIC PANEL TOTAL CA    Reviewed

 

                    4/3/2013 12:00 AM    THER/PROPH/DIAG INJ SC/IM    Reviewed

 

                    4/3/2013 12:00 AM    B12 Injection, Up to 1000 Mcg NDC#3165-0121-53    Reviewed

 

                    2013 12:00 AM    THER/PROPH/DIAG INJ SC/IM    Reviewed

 

                    2013 12:00 AM    B12 Injection, Up to 1000 Mcg NDC#2091-8486-49    Reviewed

 

                    2013 12:00 AM    THER/PROPH/DIAG INJ SC/IM    Reviewed

 

                    2013 12:00 AM    B12 Injection, Up to 1000 Mcg NDC#8781-0081-07    Reviewed

 

                    2013 12:00 AM    LIPID PANEL         Reviewed

 

                    2013 12:00 AM    VITAMIN D 25 HYDROXY    Reviewed

 

                    2013 12:00 AM    THER/PROPH/DIAG INJ SC/IM    Reviewed

 

                    3/6/2014 12:00 AM    THER/PROPH/DIAG INJ SC/IM    Reviewed

 

                    2014 12:00 AM    THER/PROPH/DIAG INJ SC/IM    Reviewed

 

                    2014 12:00 AM    B12 Injection, Up to 1000 Mcg NDC#9925-2557-59    Reviewed

 

                    2010 12:00 AM    SKIN FUNGI CULTURE    Reviewed

 

                    10/9/2010 12:00 AM    COMPREHEN METABOLIC PANEL    Reviewed

 

                    10/9/2010 12:00 AM    LIPID PANEL         Reviewed

 

                    2010 12:00 AM    THER/PROPH/DIAG INJ SC/IM    Reviewed

 

                    2010 12:00 AM    B12 Injection Ndc#[address]-04 ([place])    Reviewed

 

                    2010 12:00 AM    THER/PROPH/DIAG INJ SC/IM    Reviewed

 

                    2010 12:00 AM    Kenalog 40 Mg Im-Ndc#[address]-58 ([place])    Reviewed

 

                    10/15/2010 12:00 AM    FLU VACCINE 3 YRS & > IM    Reviewed

 

                    10/15/2010 12:00 AM    Admin.Of M/C Cov.Vaccine-Flu Vacc.    Reviewed

 

                    1/15/2011 12:00 AM    COMPLETE CBC W/AUTO DIFF WBC    Reviewed

 

                    1/15/2011 12:00 AM    COMPREHEN METABOLIC PANEL    Reviewed

 

                    1/15/2011 12:00 AM    LIPID PANEL         Reviewed

 

                    2014 12:00 AM    MAMMOGRAM SCREENING    Reviewed

 

                    2014 12:00 AM    Screening mammography, bilateral    Reviewed

 

                    7/10/2014 12:00 AM    THER/PROPH/DIAG INJ SC/IM    Reviewed

 

                    7/10/2014 12:00 AM    B12 Injection, Up to 1000 Mcg NDC#2941-6953-41    Reviewed

 

                    2011 12:00 AM    COMPLETE CBC W/AUTO DIFF WBC    Reviewed

 

                    2011 12:00 AM    COMPREHEN METABOLIC PANEL    Reviewed

 

                    2011 12:00 AM    LIPID PANEL         Reviewed

 

                    2014 12:00 AM    B12 Injection, Up to 1000 Mcg NDC#3545-2645-04    Reviewed

 

                    10/19/2014 12:00 AM    MAMMOGRAM SCREENING    Reviewed

 

                    10/19/2014 12:00 AM    Screening mammography, bilateral    Reviewed

 

                    10/16/2014 12:00 AM    COMPLETE CBC W/AUTO DIFF WBC    Reviewed

 

                    10/16/2014 12:00 AM    COMPREHEN METABOLIC PANEL    Reviewed

 

                    10/16/2014 12:00 AM    IMMUNOASSAY TUMOR     Reviewed

 

                    10/16/2014 12:00 AM    LIPID PANEL         Reviewed

 

                    10/16/2014 12:00 AM    ASSAY OF LITHIUM    Reviewed

 

                    10/16/2014 12:00 AM    MAMMOGRAM SCREENING    Reviewed

 

                    2011 12:00 AM    ASSAY OF PARATHORMONE    Reviewed

 

                    2011 12:00 AM    VITAMIN D 25 HYDROXY    Reviewed

 

                    2011 12:00 AM    ASSAY OF LITHIUM    Reviewed

 

                    2011 12:00 AM    METABOLIC PANEL TOTAL CA    Reviewed

 

                    2011 12:00 AM    CT HEAD/BRAIN W/O & W/DYE    Reviewed

 

                    3/23/2015 12:00 AM    PNEUMOCOCCAL VACC 13 GLENDY IM    Reviewed

 

                    3/23/2015 12:00 AM    Vitamin B12 injection    Reviewed

 

                    2011 12:00 AM    ASSAY OF LITHIUM    Reviewed

 

                    2011 12:00 AM    B12 Injection Ndc#[address]-47  [facility]    Reviewed

 

                    2015 12:00 AM    THER/PROPH/DIAG INJ SC/IM    Reviewed

 

                    2015 12:00 AM    B12 Injection, Up to 1000 Mcg NDC#3909-0765-09    Reviewed

 

                    2015 12:00 AM    COMPLETE CBC W/AUTO DIFF WBC    Reviewed

 

                    2015 12:00 AM    COMPREHEN METABOLIC PANEL    Reviewed

 

                    2015 12:00 AM    LIPID PANEL         Reviewed

 

                    2015 12:00 AM    ASSAY OF LITHIUM    Reviewed

 

                    2011 12:00 AM    VIT D 1 25-DIHYDROXY    Reviewed

 

                    2011 12:00 AM    VITAMIN B-12        Reviewed

 

                    2015 12:00 AM    B12 Injection, Up to 1000 Mcg NDC#5065-2459-31    Reviewed

 

                    2015 12:00 AM    THER/PROPH/DIAG INJ SC/IM    Reviewed

 

                    2015 12:00 AM    B12 Injection, Up to 1000 Mcg NDC#2915-3698-33    Reviewed

 

                    2011 12:00 AM    THER/PROPH/DIAG INJ SC/IM    Reviewed

 

                    2011 12:00 AM    B12 Injection ([facility]) Ndc#0818-9502-02    Reviewed

 

                    2015 12:00 AM    THER/PROPH/DIAG INJ SC/IM    Reviewed

 

                    2015 12:00 AM    B12 Injection, Up to 1000 Mcg NDC#9573-8087-85    Reviewed







Results Summary







                          Data and Description      Results

 

                          2004 12:00 AM        Colonoscopy-Women and Men over 50 Normal 

 

                          2008 12:00 AM         Pap Smear Declined 

 

                          10/7/2009 12:00 AM        Cholest Cry Stone Ql .0 %LDLc SerPl-mCnc 123.0 mg/dLHDLc

 SerPl-mCnc 34.0 mg/dLTrigl SerPl-mCnc 190.0 mg/dLGlucose SerPl-mCnc 78.0 mg/dL

 

                          2009 12:00 AM        Mammogram -Women over 40 Normal HIV1+2 Ab Ser Ql no risk 

 

                          2010 8:47 AM         Dexa Bone Scan Refused Aspirin reccommended Contraindication 



 

                          2010 8:48 AM         Depression Done 

 

                          2010 12:00 AM         Foot Exam-Diabetic Done 

 

                          2010 12:00 AM         Cholest Cry Stone Ql .0 %LDLc SerPl-mCnc 126.0 mg/dLGlucose

 SerPl-mCnc 102.0 mg/dL

 

                          2010 8:45 AM          TRIGLYCERIDES 122.0 mg/dLCHOLESTEROL 186.0 mg/dLHDL 36.0 mg/dLLDL

 (CALC) 126.0 mg/dLGLUCOSE 102.0 mg/dLSODIUM 143.0 mmol/LPOTASSIUM 3.70 
mmol/LCHLORIDE 111.0 mmol/LCO2 23.0 mmol/LBUN 10.0 mg/dLCREATININE 0.80 
mg/dLSGOT/AST 12.0 IU/LSGPT/ALT 11.0 IU/LALK PHOS 65.0 IU/LTOTAL PROTEIN 7.20 
g/dLALBUMIN 3.90 g/dLTOTAL BILI 0.50 mg/dLCALCIUM 10.20 mg/dLeGFR >60 
mL/min/1.73 m2WBC 5.7 RBC 3.26 HGB 10.60 g/dLHCT 31.70 %MCV 97.0 fLMCH 32.50 
pgMCHC 33.40 g/dLRDW CV 13.30 %MPV 9.70 fLPLT 287 %NEUT 62.90 %%LYMP 21.80 
%%MONO 9.90 %%EOS 5.0 %%BASO 0.40 %#NEUT 3.56 #LYMP 1.23 #MONO 0.56 #EOS 0.28 
#BASO 0.02 

 

                          2010 12:00 AM        Glucose SerPl-mCnc 96.0 mg/dLCholest Cry Stone Ql .0 %LDLc

 SerPl-mCnc 146.0 mg/dL

 

                          2010 8:26 AM         TRIGLYCERIDES 106.0 mg/dLCHOLESTEROL 199.0 mg/dLHDL 32.0 mg/dLLDL

 (CALC) 146.0 mg/dLGLUCOSE 96.0 mg/dLSODIUM 143.0 mmol/LPOTASSIUM 4.0 
mmol/LCHLORIDE 113.0 mmol/LCO2 24.0 mmol/LBUN 13.0 mg/dLCREATININE 1.0 
mg/dLSGOT/AST 11.0 IU/LSGPT/ALT 6.0 IU/LALK PHOS 56.0 IU/LTOTAL PROTEIN 6.60 
g/dLALBUMIN 3.80 g/dLTOTAL BILI 0.50 mg/dLCALCIUM 9.30 mg/dLeGFR 57 

 

                          10/6/2010 12:00 AM        Cholest Cry Stone Ql .0 %LDLc SerPl-mCnc 111.0 mg/dLGlucose

 SerPl-mCnc 81.0 mg/dL

 

                          10/6/2010 2:45 PM         TRIGLYCERIDES 123.0 mg/dLCHOLESTEROL 178.0 mg/dLHDL 42.0 mg/dLLDL

 (CALC) 111.0 mg/dLGLUCOSE 81.0 mg/dLSODIUM 139.0 mmol/LPOTASSIUM 4.10 
mmol/LCHLORIDE 106.0 mmol/LCO2 24.0 mmol/LBUN 13.0 mg/dLCREATININE 0.90 
mg/dLSGOT/AST 13.0 IU/LSGPT/ALT 11.0 IU/LALK PHOS 61.0 IU/LTOTAL PROTEIN 7.10 
g/dLALBUMIN 3.90 g/dLTOTAL BILI 0.30 mg/dLCALCIUM 9.30 mg/dLeGFR >60 mL/min/1.73
 m2WBC 6.9 RBC 3.59 HGB 11.50 g/dLHCT 35.30 %MCV 98.0 fLMCH 32.0 pgMCHC 32.60 
g/dLRDW CV 12.90 %MPV 9.90 fLPLT 311 %NEUT 64.90 %%LYMP 22.50 %%MONO 7.20 %%EOS 
5.10 %%BASO 0.30 %#NEUT 4.45 #LYMP 1.54 #MONO 0.49 #EOS 0.35 #BASO 0.02 

 

                          2011 12:00 AM         Mammogram -Women over 40 Ordered 

 

                          2011 10:25 AM        TRIGLYCERIDES 111.0 mg/dLCHOLESTEROL 195.0 mg/dLHDL 43.0 mg/dLLDL

 (CALC) 130.0 mg/dLWBC 5.3 RBC 3.76 HGB 12.0 g/dLHCT 37.80 %.0 fLMCH 
31.90 pgMCHC 31.70 g/dLRDW CV 13.0 %MPV 9.70 fLPLT 259 %NEUT 69.0 %%LYMP 17.60 
%%MONO 8.30 %%EOS 4.70 %%BASO 0.40 %#NEUT 3.63 #LYMP 0.93 #MONO 0.44 #EOS 0.25 
#BASO 0.02 GLUCOSE 102.0 mg/dLSODIUM 146.0 mmol/LPOTASSIUM 4.20 mmol/LCHLORIDE 
113.0 mmol/LCO2 23.0 mmol/LBUN 15.0 mg/dLCREATININE 1.0 mg/dLSGOT/AST 12.0 
IU/LSGPT/ALT 17.0 IU/LALK PHOS 60.0 IU/LTOTAL PROTEIN 6.90 g/dLALBUMIN 4.20 
g/dLTOTAL BILI 0.40 mg/dLCALCIUM 9.70 mg/dLeGFR 57 

 

                          2011 11:49 AM        Cholest Cry Stone Ql .0 %LDLc SerPl-mCnc 130.0 mg/dLHDLc

 SerPl-mCnc 43.0 mg/dLTrigl SerPl-mCnc 111.0 mg/dLGlucose SerPl-mCnc 102.0 mg/dL

 

                          2011 11:52 AM        Pap Smear Declined 

 

                          2011 11:28 AM        Lithium 2.080 mmol/LGLUCOSE 102.0 mg/dLSODIUM 135.0 mmol/LPOTASSIUM

 3.90 mmol/LCHLORIDE 106.0 mmol/LCO2 21.0 mmol/LBUN 12.0 mg/dLCREATININE 1.30 
mg/dLCALCIUM 10.70 mg/dLeGFR 42 

 

                          2011 8:58 AM          Lithium 0.690 mmol/L

 

                          2011 2:38 PM         VITAMIN B12 3483.0 pg/mL

 

                          2013 3:35 PM          WBC 5.1 RBC 3.73 HGB 11.70 g/dLHCT 36.40 %MCV 98.0 fLMCH 31.40

 pgMCHC 32.10 g/dLRDW CV 13.10 %MPV 9.80 fLPLT 224 %NEUT 66.80 %%LYMP 19.10 
%%MONO 9.0 %%EOS 4.90 %%BASO 0.20 %#NEUT 3.42 #LYMP 0.98 #MONO 0.46 #EOS 0.25 
#BASO 0.01 GLUCOSE 88.0 mg/dLSODIUM 141.0 mmol/LPOTASSIUM 4.10 mmol/LCHLORIDE 
110.0 mmol/LCO2 22.0 mmol/LBUN 22.0 mg/dLCREATININE 1.10 mg/dLCALCIUM 9.80 
mg/dLeGFR 50 Lithium 0.760 mmol/L

 

                          2013 11:02 AM        TRIGLYCERIDES 106.0 mg/dLCHOLESTEROL 181.0 mg/dLHDL 46.0 mg/dLLDL

 (CALC) 114.0 mg/dLVITAMIN D 41.10 ng/mL

 

                          10/17/2014 10:10 AM       WBC 5.0 RBC 3.66 HGB 11.60 g/dLHCT 36.80 %.0 fLMCH 31.70

 pgMCHC 31.50 g/dLRDW CV 13.50 %MPV 10.10 fLPLT 209 %NEUT 69.20 %%LYMP 21.0 
%%MONO 6.40 %%EOS 3.20 %%BASO 0.20 %#NEUT 3.46 #LYMP 1.05 #MONO 0.32 #EOS 0.16 
#BASO 0.01 GLUCOSE 100.0 mg/dLSODIUM 148.0 mmol/LPOTASSIUM 3.90 mmol/LCHLORIDE 
114.0 mmol/LCO2 26.0 mmol/LBUN 12.0 mg/dLCREATININE 1.20 mg/dLSGOT/AST 9.0 
IU/LSGPT/ALT <6 IU/LALK PHOS 82.0 IU/LTOTAL PROTEIN 6.90 g/dLALBUMIN 4.0 
g/dLTOTAL BILI 0.40 mg/dLCALCIUM 10.50 mg/dLeGFR 45 TRIGLYCERIDES 96.0 
mg/dLCHOLESTEROL 155.0 mg/dLHDL 38.0 mg/dLLDL (CALC) 98.0 mg/dLLithium 0.850 
mmol/LCancer Antigen (CA) 125 8.30 U/mL

 

                          2015 10:25 AM        Lithium 0.790 mmol/LWBC 4.8 RBC 3.44 HGB 11.0 g/dLHCT 35.20 

%.0 fLMCH 32.0 pgMCHC 31.30 g/dLRDW CV 14.0 %MPV 9.30 fLPLT 210 %NEUT 
70.80 %%LYMP 17.20 %%MONO 8.10 %%EOS 3.50 %%BASO 0.40 %#NEUT 3.41 #LYMP 0.83 
#MONO 0.39 #EOS 0.17 #BASO 0.02 TRIGLYCERIDES 107.0 mg/dLCHOLESTEROL 174.0 
mg/dLHDL 43.0 mg/dLLDL (CALC) 110.0 mg/dLGLUCOSE 90.0 mg/dLSODIUM 145.0 
mmol/LPOTASSIUM 3.80 mmol/LCHLORIDE 115.0 mmol/LCO2 24.0 mmol/LBUN 17.0 mg
/dLCREATININE 1.30 mg/dLSGOT/AST 18.0 IU/LSGPT/ALT 17.0 IU/LALK PHOS 56.0 
IU/LTOTAL PROTEIN 6.70 g/dLALBUMIN 3.90 g/dLTOTAL BILI 0.40 mg/dLCALCIUM 9.80 
mg/dLeGFR 41 

 

                          2015 8:50 AM        WBC 5.8 RBC 3.29 HGB 10.70 g/dLHCT 34.0 %.0 fLMCH 32.50

 pgMCHC 31.50 g/dLRDW CV 13.60 %MPV 9.60 fLPLT 223 %NEUT 69.60 %%LYMP 18.90 
%%MONO 8.50 %%EOS 2.80 %%BASO 0.20 %#NEUT 4.03 #LYMP 1.09 #MONO 0.49 #EOS 0.16 
#BASO 0.01 Lithium 0.620 mmol/LGLUCOSE 83.0 mg/dLSODIUM 139.0 mmol/LPOTASSIUM 
3.90 mmol/LCHLORIDE 109.0 mmol/LCO2 22.0 mmol/LBUN 19.0 mg/dLCREATININE 1.40 
mg/dLSGOT/AST 19.0 IU/LSGPT/ALT 21.0 IU/LALK PHOS 55.0 IU/LTOTAL PROTEIN 6.50 
g/dLALBUMIN 3.90 g/dLTOTAL BILI 0.50 mg/dLCALCIUM 9.60 mg/dLeGFR 38 
TRIGLYCERIDES 121.0 mg/dLCHOLESTEROL 192.0 mg/dLHDL 51.0 mg/dLLDL 121.0 mg/dLTSH
 1.210 uIU/mLHemoglobin A1c 5.40 %

 

                          3/15/2016 8:08 AM         VITAMIN B12 696.0 pg/mL

 

                          3/23/2016 8:26 AM         WBC 7.0 RBC 3.61 HGB 11.80 g/dLHCT 37.70 %.0 fLMCH 32.70

 pgMCHC 31.30 g/dLRDW CV 12.50 %MPV 10.0 fLPLT 207 %NEUT 73.60 %%LYMP 16.40 
%%MONO 6.60 %%EOS 3.0 %%BASO 0.30 %#NEUT 5.15 #LYMP 1.15 #MONO 0.46 #EOS 0.21 
#BASO 0.02 Lithium 0.940 mmol/LGLUCOSE 108.0 mg/dLSODIUM 143.0 mmol/LPOTASSIUM 
4.30 mmol/LCHLORIDE 110.0 mmol/LCO2 27.0 mmol/LBUN 16.0 mg/dLCREATININE 1.60 
mg/dLSGOT/AST 13.0 IU/LSGPT/ALT 7.0 IU/LALK PHOS 71.0 IU/LTOTAL PROTEIN 6.80 
g/dLALBUMIN 4.0 g/dLTOTAL BILI 0.20 mg/dLCALCIUM 10.40 mg/dLeGFR 32 
TRIGLYCERIDES 113.0 mg/dLCHOLESTEROL 169.0 mg/dLHDL 42.0 mg/dLLDL (CALC) 104.0 
mg/dLTSH 2.20 uIU/mLHemoglobin A1c 5.20 %

 

                          3/25/2016 9:17 AM         COLOR YELLOW APPEARANCE CLEAR SPEC GRAV 1.010 pH 7.0 PROTEIN 

NEGATIVE GLUCOSE NEGATIVE mg/dLKETONE NEGATIVE BILIRUBIN NEGATIVE BLOOD NEGATIVE
 NITRITE NEGATIVE LEUK SCREEN SMALL CASTS/LPF NEGATIVE /LPFCRYSTALS NEGATIVE 
MUCOUS THRDS NEGATIVE BACTERIA NEGATIVE EPITH CELLS FEW SQUAMOUS /HPFTRICHOMONAS
 NEGATIVE YEAST NEGATIVE 







History Of Immunizations







       Name    Date Admin    Mfg Name    Mfg Code    Trade Name    Lot#    Route    Inj    Vis Given    Vis

 Pub                                    CVX

 

        Influenza    2008    Not Entered    NE      Not Entered            Not Entered    Not Entered

                    1            999

 

        X       12/19/2008    [employer]    MSD     Pneumovax 23            Intramuscular    Not Entered

                    1            999

 

           Influenza    10/15/2010    expressor software [employer].    NOV        Fluvirin > 12 Years    

[id number]     Intramuscular    Left Deltoid    10/15/2010    2009    999

 

          X         3/23/2015    [employer]yerst-[employer]ederle[employer]rijesh    [facility]       Prevnar 13    [id number]    Intramuscular

                Right Gluteous Medius    3/23/2015       2013       109







History of Past Illness







                    Name                Date of Onset       Comments

 

                    Peritoneal Neoplasm, Malignant                         

 

                    Hyperlipidemia                           

 

                    Bipolar disorder, unspecified                         

 

                    Artificial opening status; colostomy                         

 

                    B12 deficiency                           

 

                    Anemia, Pernicious                         

 

                    Arthritis unspecified                         

 

                    cervical cancer                          

 

                    Artificial opening status; colostomy    2010  1:10PM     

 

                    Bipolar disorder, unspecified    2010  1:10PM     

 

                    Hyperlipidemia      2010  1:10PM     

 

                    Anemia, Pernicious    2010  1:10PM     

 

                    Postoperative Follow-Up    2010  1:55PM     

 

                    Postoperative Follow-Up    Mar  8 2010 10:57AM     

 

                    Artificial opening status; colostomy    Mar  8 2010  1:19PM     

 

                    Peritoneal Neoplasm, Malignant    Mar  8 2010  1:19PM     

 

                    Artificial opening status; colostomy    2010  1:40PM     

 

                    Hyperlipidemia      2010  1:40PM     

 

                    Anemia, Pernicious    2010  1:40PM     

 

                    Peritoneal Neoplasm, Malignant    2010  1:40PM     

 

                    Arthritis unspecified    2010  1:40PM     

 

                    Anemia of Chronic Illness    2010  1:40PM     

 

                    Tinea corporis      2010  3:17PM     

 

                    Bipolar disorder, unspecified    2010  1:33PM     

 

                    Hyperlipidemia      2010  1:33PM     

 

                    Anemia, Pernicious    2010  1:33PM     

 

                    Peritoneal Neoplasm, Malignant    2010  1:33PM     

 

                    B12 deficiency      2010  1:33PM     

 

                    Ethmoidal Sinusitis, Acute    Sep 21 2010  3:53PM     

 

                    Wheezing            Sep 21 2010  3:53PM     

 

                    Flu                 Oct 15 2010  1:40PM     

 

                    Bipolar disorder, unspecified    Oct 15 2010  1:42PM     

 

                    Hyperlipidemia      Oct 15 2010  1:42PM     

 

                    Anemia, Pernicious    Oct 15 2010  1:42PM     

 

                    Peritoneal Neoplasm, Malignant    Oct 15 2010  1:42PM     

 

                    Bipolar disorder, unspecified    2011 12:01PM     

 

                    Hyperlipidemia      2011 12:01PM     

 

                    Anemia, Pernicious    2011 12:01PM     

 

                    Peritoneal Neoplasm, Malignant    2011 12:01PM     

 

                    Bipolar disorder, unspecified    Apr 15 2011 10:55AM     

 

                    Major Depression    2011 10:11AM     

 

                    Bipolar Disorder    2011 10:11AM     

 

                    Cancer              May 10 2011  4:16PM     

 

                    Major Depression    May 10 2011  3:16PM     

 

                    Bipolar Disorder    May 10 2011  3:16PM     

 

                    Hypercalcemia       May 23 2011  2:47PM     

 

                    Bipolar disorder, unspecified    May 23 2011  2:47PM     

 

                    Colon Cancer, Personal History    May 23 2011  2:47PM     

 

                    Bipolar Disorder    May 31 2011  4:39PM     

 

                    Depressive Disorder    2011 10:01AM     

 

                    Vitamin B12 deficiency    2011 10:01AM     

 

                    Vitamin D Deficiency    2011  5:07PM     

 

                    Anemia, Vitamin B12 Deficiency    2011  5:07PM     

 

                    B12 deficiency      2011  3:56PM     

 

                    Routine gynecological examination    Aug  4 2011  9:08AM     

 

                    Screening Examination for Breast Cancer    Aug  4 2011  9:08AM     

 

                    Tinea Corporis      Aug  4 2011  9:08AM     

 

                    Depressive Disorder    Sep 23 2011  8:47AM     

 

                    Contact Dermatitis    Sep 23 2011  8:47AM     

 

                    Anemia, Pernicious    Sep 23 2011  8:47AM     

 

                    B12 deficiency      Sep 23 2011  8:47AM     

 

                    B12 deficiency      Sep 27 2011  2:58PM     

 

                    B12 deficiency      Oct 20 2011  2:34PM     

 

                    Flu                 Dec  9 2011  3:16PM     

 

                    B12 deficiency      Dec  9 2011  3:17PM     

 

                    B12 deficiency      2012  4:52PM     

 

                    B12 deficiency      2012 11:10AM     

 

                    B12 deficiency      2012  3:37PM     

 

                    B12 deficiency      May  3 2012  4:10PM     

 

                    B12 deficiency      2012  2:54PM     

 

                    B12 deficiency      2012 11:23AM     

 

                    B12 deficiency      Aug  9 2012  2:08PM     

 

                    B12 deficiency      Sep  6 2012  4:36PM     

 

                    B12 deficiency      Oct 16 2012 10:23AM     

 

                    Flu                 Feb  4   3:11PM     

 

                    Bipolar disorder, unspecified    Feb  2013  2:48PM     

 

                    Anemia, Pernicious    Feb  4   2:48PM     

 

                    B12 deficiency      Feb  4   2:48PM     

 

                    Extrapyramidal abnormal movement disorder    2013  2:48PM     

 

                    B12 deficiency      Apr  3 2013 12:03PM     

 

                    Bipolar disorder, unspecified    May  7 2013  1:31PM     

 

                    Anemia, Pernicious    May  7 2013  1:31PM     

 

                    B12 deficiency      May  7 2013  1:31PM     

 

                    Extrapyramidal abnormal movement disorder    May  7 2013  1:31PM     

 

                    B12 deficiency      2013  3:42PM     

 

                    B12 deficiency      2013  1:31PM     

 

                    Hyperlipidemia      Aug  7 2013 10:37AM     

 

                    Vitamin D Deficiency    Aug  7 2013 10:37AM     

 

                    Bipolar disorder, unspecified    Aug  7 2013 10:37AM     

 

                    Anemia, Pernicious    Aug  7 2013 10:37AM     

 

                    B12 deficiency      Aug  7 2013 10:37AM     

 

                    B12 deficiency      Sep 25 2013 11:15AM     

 

                    B12 deficiency      Dec 11 2013  3:16PM     

 

                    B12 deficiency      Mar  6 2014  1:48PM     

 

                    B12 deficiency      May 21 2014  3:17PM     

 

                    Screening Examination for Breast Cancer    2014  3:23PM     

 

                    Periumbilical abdominal pain    2014  3:23PM     

 

                    B12 deficiency      Jul 10 2014  2:52PM     

 

                    Anemia, Vitamin B12 Deficiency    Aug 13 2014  4:50PM     

 

                    Bipolar disorder    Oct 16 2014 11:13AM     

 

                    Hyperlipidemia      Oct 16 2014 11:13AM     

 

                    Anemia, Pernicious    Oct 16 2014 11:13AM     

 

                    Peritoneal Neoplasm, Malignant    Oct 16 2014 11:13AM     

 

                    Screening breast examination    Oct 16 2014 11:13AM     

 

                    Weight loss         Oct 16 2014 11:13AM     

 

                    Anemia, Pernicious    Mar 23 2015  2:57PM     

 

                    B12 deficiency      Mar 23 2015  2:57PM     

 

                    Need for Prevnar vaccine    Mar 23 2015  2:57PM     

 

                    Bipolar disorder    Mar 23 2015  2:57PM     

 

                    Hyperlipidemia      Mar 23 2015  2:57PM     

 

                    Anemia, Pernicious    Mar 23 2015  2:57PM     

 

                    Peritoneal Neoplasm, Malignant    Mar 23 2015  2:57PM     

 

                    B12 deficiency      May  4 2015  4:48PM     

 

                    Hyperlipidemia      May 13 2015  9:56AM     

 

                    Anemia              May 13 2015  9:56AM     

 

                    Bipolar disorder    May 13 2015  9:56AM     

 

                    Bipolar disorder    May 14 2015  3:27PM     

 

                    Hyperlipidemia      May 14 2015  3:27PM     

 

                    Anemia, Pernicious    May 14 2015  3:27PM     

 

                    Peritoneal Neoplasm, Malignant    May 14 2015  3:27PM     

 

                    B12 deficiency      2015  2:20PM     

 

                    B12 deficiency      2015 11:34AM     

 

                    B12 deficiency      Aug 18 2015  9:06AM     

 

                    Tinea Corporis      Sep 18 2015  8:54AM     

 

                    B12 deficiency      Sep 18 2015  8:54AM     

 

                    B12 deficiency      2015 10:28AM     

 

                    Herpes zoster without complication    Dec  3 2015  9:52AM     

 

                    B12 deficiency      Dec 23 2015 11:21AM     

 

                    B12 deficiency      2016  4:51PM     

 

                    Vitamin B 12 deficiency    Mar 14 2016  5:35PM     

 

                    B12 deficiency      Mar 15 2016 12:14PM     

 

                    B12 deficiency      May  5 2016 11:30AM     

 

                    Edema               May  5 2016 11:30AM     

 

                    Dermatitis          May  5 2016 11:30AM     

 

                    Edema               May 17 2016  8:38AM     

 

                    Shortness of breath    May 17 2016  8:38AM     

 

                    Bilateral edema of lower extremity    2016  2:06PM     

 

                    B12 deficiency      2016  2:06PM     

 

                    B12 deficiency      2016 11:50AM     







Payers







           Insurance Name    Company Name    Plan Name    Plan Number    Policy Number    Policy Group

 Number                                 Start Date

 

                    Medicare Part A    Medicare C              [id number]              N/A

 

                          [employer]    [employer]                 [id number]

                                                    

 

                    Medicare Part A    Medicare - Lab/Xray              [id number]              2006

 

                    Medicare Part B    [employer]ic[employer] [place]              [id number]              2006

 

                          [employer]    [employer]                 50 percent

                                                    2009

 

                    [employer] [facility]              [id number]              N/A

 

                    Medicare Part A    Medicare Part A              [id number]              N/A

 

                    Medicare Part A    Medicare Part A              [id number]              2006









History of Encounters







                    Visit Date          Visit Type          Provider

 

                    2016           Office visit        Bret Forte APRN

 

                    2016            Office visit        Bhupinder Louise DO

 

                    3/15/2016           Nurse visit         Bhupinder Louise DO

 

                    2016            Nurse visit         Bhupinder Louise DO

 

                    2015          Nurse visit         Bhupinder Aspen DO

 

                    12/3/2015           Office visit        Bhupinder Aspen DO

 

                    2015          Nurse visit         Bhupinder Aspen DO

 

                    2015           Office visit        Bhupinder Louise DO

 

                    2015           Nurse visit         Bhupinder Aspen DO

 

                    2015            Nurse visit         Bhupinder Louise DO

 

                    2015            Nurse visit         Bhupinder Louise DO

 

                    2015           Office visit        Bhupinder Louise DO

 

                    2015            Nurse visit         Bhupinder Louise DO

 

                    3/23/2015           Office visit        Bhupinder Louise DO

 

                    10/16/2014          Office visit        Bhupinder Louise DO

 

                    2014           Nurse visit         Radha Ontiveros APRN

 

                    7/10/2014           Nurse visit         Bhupinder Aspen DO

 

                    2014           Office visit        Bhupinder Aspen DO

 

                    2014           Nurse visit         Bhupinder Aspen DO

 

                    3/6/2014            Nurse visit         Bhupinder Aspen DO

 

                    2014            [facility]            EARNEST Lopez MD

 

                    2013          Nurse visit         Bhupinder Aspen DO

 

                    2013           Nurse visit         Bhupinder Aspen DO

 

                    2013            Office visit        Bhupinder Aspen DO

 

                    2013            Nurse visit         Bhupinder Aspen DO

 

                    2013            Nurse visit         Bhupinder Aspen DO

 

                    2013            Office visit        Bhupinder Aspen DO

 

                    4/3/2013            Nurse visit         Bhupinder Aspen DO

 

                    2013            Office visit        Bhupinder Aspen DO

 

                    10/16/2012          Nurse visit         Bhupinder Aspen DO

 

                    2012            Nurse visit         Bhupinder Aspen DO

 

                    2012            Voided              Bhupinder Aspen DO

 

                    2012            Nurse visit         Bhupinder Aspen DO

 

                    2012            Nurse visit         Bhupinder Aspen DO

 

                    2012           Nurse visit         Bhupinder Aspen DO

 

                    5/3/2012            Nurse visit         Bhupinder Aspen DO

 

                    2012           Nurse visit         Bhupinder Aspen DO

 

                    2012           Nurse visit         Bhupinder Aspen DO

 

                    2012           Nurse visit         Bhupinder Aspen DO

 

                    2011           Nurse visit         Bhupinder Aspen DO

 

                    10/20/2011          Nurse visit         Bhupinder Aspen DO

 

                    2011           Office visit        Bhupinder Aspen DO

 

                    2011           Nurse visit         Radha GREEN

 

                    2011            Office visit        Bhupinder Aspen DO

 

                    2011           Nurse visit         Bhupinder Aspen DO

 

                    2011            Office visit        Bhupinder Aspen DO

 

                    2011           Office visit        Bhupinder Aspen DO

 

                    5/10/2011           Office visit        Bhupinder Aspen DO

 

                    2011           Office visit        Bhupinder Aspen DO

 

                    4/15/2011           Office visit        Devin Angel DO

 

                    2011           Office visit        Devin Angel DO

 

                    10/15/2010          Office visit        Devin Angel DO

 

                    2010           Office visit        Devin Angel DO

 

                    2010            Office visit        Devin Angel DO

 

                    2010           Office visit        Devin Angel DO

 

                    2010            Office visit        Devin Angel DO

 

                    3/8/2010            Office visit        Devin Masterson MD

 

                    2010            Surgery             Devin Masterson MD

 

                    2010            Office visit        Devin Angel DO

 

                    2010           Surgery             Devin Masterson MD

 

                    2010           Hospital            Devin Masterson MD

 

                    2010           [facility]            Devin Masterson MD

 

                    10/22/2009          Office visit        Devin Angel DO

## 2019-06-26 NOTE — XMS REPORT
MU2 Ambulatory Summary

                             Created on: 2015



Pauline Gan

External Reference #: [id number]

: 1950

Sex: Female



Demographics







                          Address                   [address]

[place]  [address]

 

                          Home Phone                [phone]

 

                          Preferred Language        English

 

                          Marital Status            Legally 

 

                          Islam Affiliation     Unknown

 

                          Race                      White

 

                          Ethnic Group              Not  or 





Author







                          Bhupinder Aguilar

 

                          [facility]

 

                          Address                   [address]

[place]  [id number]



 

                          Phone                     [phone]







Care Team Providers







                    Care Team Member Name    Role                Phone

 

                    Bhupinder Louise    PCP                 Unavailable







Allergies and Adverse Reactions







                    Name                Reaction            Notes

 

                    NO KNOWN DRUG ALLERGIES                         







Plan of Treatment







             Planned Activity    Comments     Planned Date    Planned Time    Plan/Goal

 

             COMPLETE CBC W/AUTO DIFF WBC                 2013     12:00 AM      

 

             ASSAY OF LITHIUM                 2013     12:00 AM      

 

             METABOLIC PANEL TOTAL CA                 2013     12:00 AM      

 

             VITAMIN B-12                 2013     12:00 AM      

 

             ASSAY OF FOLIC ACID SERUM                 2013     12:00 AM      

 

             THER/PROPH/DIAG INJ SC/IM                 2013    12:00 AM      







Medications







                                        Active 

 

             Name         Start Date    Estimated Completion Date    SIG          Comments

 

                syringe with needle (disp) 1 mL 25 X 1" miscellaneous syringe    2011                        use 

for injection once a month               

 

                Latuda 20 mg oral tablet                                    take 1 tablet (20 mg) by oral route once daily with

 food (at least 350 calories)            

 

             pravastatin 40 mg oral tablet    3/30/2015                 TAKE 1 TABLET BY MOUTH DAILY     

 

                fluconazole 100 mg oral tablet    2015                       take 1 tablet (100 mg) by oral route

 once a week                             

 

                ketoconazole 2 % topical cream    2015                       apply to the affected area(s) by topical

 route 2 times per day                   

 

                Namenda XR 28 mg oral capsule,sprinkle,ER 24hr    2015                       take 1 capsule (28

 mg) by oral route once daily            









                                         

 

             Name         Start Date    Expiration Date    SIG          Comments

 

             Reglan 10mg    3/29/2010    2010    one ac and hs     

 

                Keflex 500 mg oral capsule    2010       10/1/2010       take 1 capsule (500 mg) by oral

 route every 6 hours for 10 days         

 

                Bactrim -160 mg oral tablet    2011       take 1 tablet by oral route

 every 12 hours for 7 days               

 

                triamcinolone acetonide 0.1 % topical cream    2011      apply a thin

 layer to the affected area(s) by topical route 2 times per day     

 

                sertraline 100 mg oral tablet    4/10/2012       5/10/2012       take 1.5 tablets by oral route

 daily for 30 days                       

 

                ergocalciferol (vitamin D2) 50,000 unit oral capsule    4/15/2013       2013       TAKE

 ONE CAPSULE BY MOUTH ONCE A WEEK        

 

                CYANOCOBALAM 1000MCGINJ 1000 milliliter    2013       INJECT 1ML INTRAMUSCULAR

 ONCE A MONTH                            

 

                pravastatin 40 mg oral tablet    3/25/2014       3/20/2015       TAKE ONE TABLET BY MOUTH EVERY

 DAY                                     

 

                          Zostavax (PF) 19,400 unit/0.65 mL subcutaneous suspension for reconstitution    3/23/2015

                    3/24/2015           inject 0.65 milliliter by subcutaneous route once     

 

                lithium carbonate 300 mg oral capsule    2015       take 1 capsule (300

 mg) by oral route 2 for 30 days         









                                        Discontinued 

 

             Name         Start Date    Discontinued Date    SIG          Comments

 

                Tylenol 325 mg oral tablet                    2013        take 1 - 2 tablets (325 -650 mg) by oral

 route every 4-6 hours as needed         

 

                Calcium 600 + D(3) 600 mg(1,500mg) -400 unit oral tablet                    2011       take 1 tablet

 by oral route 2 times a day            no longer taking

 

                Vitamin B-12 1,000 mcg oral tablet extended release    2010       take 1

 tablet by oral route daily             no longer taking

 

                Antifungal (clotrimazole) 1 % topical cream    2010       apply to the 

affected and surrounding areas of skin by topical route 2 times per day morning 
and evening                              

 

                sertraline 100 mg oral tablet    5/10/2011       2011       take 2 tablets (200 mg) by 

oral route once daily                   discontinued by Dr. Serrano

 

                mirtazapine 15 mg oral tablet                    2011        take 1 tablet (15 mg) by oral route 

once daily before bedtime               Dr. Serrano

 

                mirtazapine 15 mg oral tablet                    2011        take 1 tablet (15 mg) by oral route 

once daily before bedtime               dc'd by Dr. Serrano

 

                Pristiq 50 mg oral tablet extended release 24 hr                    2013        take 1 tablet (50

 mg) by oral route once daily           Dr. Serrano

 

                Pristiq 50 mg oral tablet extended release 24 hr                    2013        take 1 tablet (50

 mg) by oral route once daily           dose updated

 

                Vitamin B-12 1,000 mcg/mL injection solution    2011        inject 1 milliliter

 (1,000 mcg) by intramuscular route once a month    on list already

 

                clotrimazole 1 % topical cream    2011        apply to the affected and surrounding

 areas of skin by topical route 2 times per day in the morning and evening     

 

                Vitamin D2 50,000 unit oral capsule    2011        take 1 capsule (50,000

 unit) by oral route once weekly        generic on list

 

                Pravachol 40 mg oral tablet    2012        take 1 tablet (40 mg) by oral 

route once daily for 90 days            generic on list

 

                lithium carbonate 300 mg oral capsule    2012        take 1 capsule by oral

 route daily                            dose updated

 

                Pristiq 100 mg oral tablet extended release 24 hr                    4/10/2012       take 1 and 1/2 

tablet (150 mg) by oral route once daily    Mental Health provider

 

                Pristiq 100 mg oral tablet extended release 24 hr                    4/10/2012       take 1 and 1/2 

tablet (150 mg) by oral route once daily    Discontinued by Dr Efrain Knight at [facility]

 

                hydroxyzine HCl 50 mg oral tablet    10/16/2014      2015       take 1 tablet (50 mg) 

by oral route at bedtime                 







Problem List







                    Description         Status              Onset

 

                    Artificial opening status; colostomy    Active               

 

                    Bipolar disorder, unspecified    Active               

 

                    Hyperlipidemia      Active               

 

                    Peritoneal Neoplasm, Malignant    Active               

 

                    Anemia, Pernicious    Active               

 

                    Arthritis unspecified    Active               

 

                    B12 deficiency      Active               







Vital Signs







      Date    Time    BP-Sys(mm[Hg]    BP-Lynn(mm[Hg])    HR(bpm)    RR(rpm)    Temp    WT    HT    HC    BMI

                    BSA                 BMI Percentile      O2 Sat(%)

 

        2015    8:52:00 AM    132 mmHg    68 mmHg    52 bpm    20 rpm    97.8 F    141 lbs    69 in

                          20.82 kg/m2    1.76 m2                   100 %

 

        2015    3:25:00 PM    120 mmHg    62 mmHg    72 bpm    16 rpm    98.1 F    136 lbs    69 in

                          20.0835 kg/m    1.733 m                 98 %

 

       3/23/2015    2:55:00 PM    130 mmHg    76 mmHg    68 bpm    18 rpm    97 F    140 lbs    69 in    

                20.67 kg/m2     1.76 m2                         98 %

 

        10/16/2014    11:11:00 AM    120 mmHg    66 mmHg    77 bpm    20 rpm    98 F    130 lbs    69 in

                          19.1974 kg/m    1.6943 m                 100 %

 

        2014    3:21:00 PM    130 mmHg    66 mmHg    63 bpm    18 rpm    97.2 F    160 lbs    69 in

                          23.63 kg/m2    1.88 m2                   99 %

 

        2013    10:35:00 AM    132 mmHg    70 mmHg    66 bpm    20 rpm    98.1 F    157 lbs    69 in

                          23.1846 kg/m    1.862 m                  

 

        2013    1:29:00 PM    132 mmHg    70 mmHg    76 bpm    18 rpm    98.2 F    166 lbs    69 in 

                          24.51 kg/m2    1.91 m2                    

 

       2013    2:46:00 PM    128 mmHg    70 mmHg    76 bpm    16 rpm    98 F    160 lbs    69 in     

                23.6276 kg/m    1.8797 m                       

 

        2011    8:49:00 AM    128 mmHg    78 mmHg    70 bpm    18 rpm    97.9 F    164 lbs    69 in

                          24.22 kg/m2    1.90 m2                    

 

     2011    1:31:00 PM    132 mmHg    68 mmHg    84 bpm         97 F    167 lbs                        

                                         

 

        2011    9:09:00 AM    128 mmHg    70 mmHg    72 bpm    18 rpm    98.2 F    163 lbs    64 in 

                          27.98 kg/m2    1.83 m2                    

 

       2011    10:01:00 AM    132 mmHg    70 mmHg    72 bpm    18 rpm    98.2 F    154 lbs             

                                                                 

 

       2011    2:47:00 PM    128 mmHg    70 mmHg    72 bpm    18 rpm    97.8 F    156 lbs             

                                                                 

 

       5/10/2011    3:16:00 PM    144 mmHg    80 mmHg    72 bpm    18 rpm    98.2 F    158 lbs             

                                                                 

 

        2011    10:11:00 AM    132 mmHg    70 mmHg    70 bpm    18 rpm    98.2 F    168 lbs    69 in

                          24.81 kg/m2    1.93 m2                    

 

        4/15/2011    10:52:00 AM    110 mmHg    60 mmHg    75 bpm    16 rpm    97.5 F    172.375 lbs    

69 in                     25.4551 kg/m    1.951 m                 100 %

 

        2011    11:43:00 AM    120 mmHg    82 mmHg    75 bpm    16 rpm    97.2 F    178.5 lbs    69

 in                       26.36 kg/m2    1.99 m2                   100 %

 

        10/15/2010    1:32:00 PM    120 mmHg    70 mmHg    80 bpm    18 rpm    96.6 F    177 lbs    69 in

                          26.1381 kg/m    1.977 m                 100 %

 

        2010    3:50:00 PM    168 mmHg    100 mmHg    82 bpm    18 rpm    97.8 F    177.5 lbs    69

 in                       26.21 kg/m2    1.98 m2                   97 %

 

        2010    1:21:00 PM    140 mmHg    80 mmHg    59 bpm    16 rpm    97.6 F    173.25 lbs    69 

in                        25.5843 kg/m    1.956 m                 100 %

 

        2010    3:02:00 PM    140 mmHg    80 mmHg    61 bpm    16 rpm    97.6 F    173.125 lbs    69

 in                       25.57 kg/m2    1.96 m2                   99 %

 

        2010    1:23:00 PM    130 mmHg    80 mmHg    66 bpm    16 rpm    96.8 F    173 lbs    69 in 

                          25.5474 kg/m    1.9546 m                 100 %

 

        2010    12:58:00 PM    130 mmHg    88 mmHg    75 bpm    16 rpm    98.4 F    172.25 lbs    69

 in                       25.44 kg/m2    1.95 m2                   100 %







Social History







                    Name                Description         Comments

 

                    denies alcohol use                         

 

                    denies smoking                           

 

                    Denies illicit substance abuse                         

 

                    retired                                 direct care

 

                    Single                                   

 

                    Exercises regularly                         

 

                    Attended some college                         

 

                    Tobacco             Never smoker         







History of Procedures







                    Date Ordered        Description         Order Status

 

                    2010 12:00 AM    COMPREHEN METABOLIC PANEL    Reviewed

 

                    2010 12:00 AM    COMPLETE CBC W/AUTO DIFF WBC    Reviewed

 

                    2010 12:00 AM    LIPID PANEL         Reviewed

 

                          2015 12:00 AM        B12 Injection, Up to 1000 Mcg NDC#6571-4796-81 [facility] 

                                        Reviewed

 

                    2011 12:00 AM    MAMMOGRAM SCREENING    Reviewed

 

                    2011 12:00 AM    CYTOPATH C/V THIN LAYER    Reviewed

 

                    2011 12:00 AM    B12 Injection 1 cc NDC#[address]-85    Reviewed

 

                    2015 12:00 AM    THER/PROPH/DIAG INJ SC/IM    Reviewed

 

                    2015 12:00 AM    B12 Injection, Up to 1000 Mcg NDC#0026-7445-47    Reviewed

 

                    2011 12:00 AM    THER/PROPH/DIAG INJ SC/IM    Reviewed

 

                    2011 12:00 AM    B12 Injection([place]) Ndc#0115-1834-46-    Reviewed

 

                    10/20/2011 12:00 AM    THER/PROPH/DIAG INJ SC/IM    Reviewed

 

                    10/20/2011 12:00 AM    B12 Injection([facility]) Ndc#3963-3390-93-    Reviewed

 

                    2011 12:00 AM    ***Immunization administration, Medicare flu    Reviewed

 

                    2011 12:00 AM    Fluzone ** MEDICARE Only **    Reviewed

 

                    2011 12:00 AM    THER/PROPH/DIAG INJ SC/IM    Reviewed

 

                    2011 12:00 AM    B12 Injection ([facility]) Ndc#1276-5122-35    Reviewed

 

                    2012 12:00 AM    THER/PROPH/DIAG INJ SC/IM    Reviewed

 

                    2012 12:00 AM    B12 Injection ([facility]) Ndc#1859-0382-38    Reviewed

 

                    2012 12:00 AM    THER/PROPH/DIAG INJ SC/IM    Reviewed

 

                    2012 12:00 AM    B12 Injection([place]) Ndc#5870-4274-97-    Reviewed

 

                    5/3/2012 12:00 AM    THER/PROPH/DIAG INJ SC/IM    Reviewed

 

                    5/3/2012 12:00 AM    B12 Injection([place]) Ndc#8300-0334-59-    Reviewed

 

                    2012 12:00 AM    IMMUNOTHERAPY INJECTIONS    Reviewed

 

                    2012 12:00 AM    B12 Injection([place]) Ndc#8112-4252-34-    Reviewed

 

                    2012 12:00 AM    THER/PROPH/DIAG INJ SC/IM    Reviewed

 

                    2012 12:00 AM    B12 Injection, Up to 1000 Mcg NDC#0237-0770-70    Reviewed

 

                    2012 12:00 AM    THER/PROPH/DIAG INJ SC/IM    Reviewed

 

                    2012 12:00 AM    B12 Injection, Up to 1000 Mcg NDC#7014-4431-15    Reviewed

 

                    2012 12:00 AM    THER/PROPH/DIAG INJ SC/IM    Reviewed

 

                    2012 12:00 AM    B12 Injection, Up to 1000 Mcg NDC#0548-2785-35    Reviewed

 

                    10/16/2012 12:00 AM    THER/PROPH/DIAG INJ SC/IM    Reviewed

 

                    10/16/2012 12:00 AM    B12 Injection, Up to 1000 Mcg NDC#2913-9172-97    Reviewed

 

                    2010 12:00 AM    COMPREHEN METABOLIC PANEL    Reviewed

 

                    2010 12:00 AM    COMPLETE CBC W/AUTO DIFF WBC    Reviewed

 

                    2010 12:00 AM    LIPID PANEL         Reviewed

 

                    2013 12:00 AM    Flu Injection 3 Years And Above NDC# [address]-99  [facility]    Reviewed



 

                    2013 12:00 AM    COMPLETE CBC W/AUTO DIFF WBC    Reviewed

 

                    2013 12:00 AM    ASSAY OF LITHIUM    Reviewed

 

                    2013 12:00 AM    METABOLIC PANEL TOTAL CA    Reviewed

 

                    4/3/2013 12:00 AM    THER/PROPH/DIAG INJ SC/IM    Reviewed

 

                    4/3/2013 12:00 AM    B12 Injection, Up to 1000 Mcg NDC#7660-6667-81    Reviewed

 

                    2013 12:00 AM    THER/PROPH/DIAG INJ SC/IM    Reviewed

 

                    2013 12:00 AM    B12 Injection, Up to 1000 Mcg NDC#0727-1749-87    Reviewed

 

                    2013 12:00 AM    THER/PROPH/DIAG INJ SC/IM    Reviewed

 

                    2013 12:00 AM    B12 Injection, Up to 1000 Mcg NDC#0804-0557-33    Reviewed

 

                    2013 12:00 AM    LIPID PANEL         Reviewed

 

                    2013 12:00 AM    VITAMIN D 25 HYDROXY    Reviewed

 

                    2013 12:00 AM    THER/PROPH/DIAG INJ SC/IM    Reviewed

 

                    3/6/2014 12:00 AM    THER/PROPH/DIAG INJ SC/IM    Reviewed

 

                    2014 12:00 AM    THER/PROPH/DIAG INJ SC/IM    Reviewed

 

                    2014 12:00 AM    B12 Injection, Up to 1000 Mcg NDC#3212-6294-67    Reviewed

 

                    2010 12:00 AM    SKIN FUNGI CULTURE    Reviewed

 

                    10/9/2010 12:00 AM    COMPREHEN METABOLIC PANEL    Reviewed

 

                    10/9/2010 12:00 AM    LIPID PANEL         Reviewed

 

                    2010 12:00 AM    THER/PROPH/DIAG INJ SC/IM    Reviewed

 

                    2010 12:00 AM    B12 Injection Ndc#[address]-73 ([facility])    Reviewed

 

                    2010 12:00 AM    THER/PROPH/DIAG INJ SC/IM    Reviewed

 

                    2010 12:00 AM    Kenalog 40 Mg Im-Ndc#[address]-03 ([facility])    Reviewed

 

                    10/15/2010 12:00 AM    FLU VACCINE 3 YRS & > IM    Reviewed

 

                    10/15/2010 12:00 AM    Admin.Of M/C Cov.Vaccine-Flu Vacc.    Reviewed

 

                    1/15/2011 12:00 AM    COMPLETE CBC W/AUTO DIFF WBC    Reviewed

 

                    1/15/2011 12:00 AM    COMPREHEN METABOLIC PANEL    Reviewed

 

                    1/15/2011 12:00 AM    LIPID PANEL         Reviewed

 

                    2014 12:00 AM    MAMMOGRAM SCREENING    Reviewed

 

                    2014 12:00 AM    Screening mammography, bilateral    Reviewed

 

                    7/10/2014 12:00 AM    THER/PROPH/DIAG INJ SC/IM    Reviewed

 

                    7/10/2014 12:00 AM    B12 Injection, Up to 1000 Mcg NDC#6405-4169-53    Reviewed

 

                    2011 12:00 AM    COMPLETE CBC W/AUTO DIFF WBC    Reviewed

 

                    2011 12:00 AM    COMPREHEN METABOLIC PANEL    Reviewed

 

                    2011 12:00 AM    LIPID PANEL         Reviewed

 

                    2014 12:00 AM    B12 Injection, Up to 1000 Mcg NDC#8352-3210-31    Reviewed

 

                    10/19/2014 12:00 AM    MAMMOGRAM SCREENING    Reviewed

 

                    10/19/2014 12:00 AM    Screening mammography, bilateral    Reviewed

 

                    10/16/2014 12:00 AM    COMPLETE CBC W/AUTO DIFF WBC    Reviewed

 

                    10/16/2014 12:00 AM    COMPREHEN METABOLIC PANEL    Reviewed

 

                    10/16/2014 12:00 AM    IMMUNOASSAY TUMOR     Reviewed

 

                    10/16/2014 12:00 AM    LIPID PANEL         Reviewed

 

                    10/16/2014 12:00 AM    ASSAY OF LITHIUM    Reviewed

 

                    10/16/2014 12:00 AM    MAMMOGRAM SCREENING    Reviewed

 

                    2011 12:00 AM    ASSAY OF PARATHORMONE    Reviewed

 

                    2011 12:00 AM    VITAMIN D 25 HYDROXY    Reviewed

 

                    2011 12:00 AM    ASSAY OF LITHIUM    Reviewed

 

                    2011 12:00 AM    METABOLIC PANEL TOTAL CA    Reviewed

 

                    2011 12:00 AM    CT HEAD/BRAIN W/O & W/DYE    Reviewed

 

                    3/23/2015 12:00 AM    PNEUMOCOCCAL VACC 13 GLENDY IM    Reviewed

 

                    3/23/2015 12:00 AM    Vitamin B12 injection    Reviewed

 

                    2011 12:00 AM    ASSAY OF LITHIUM    Reviewed

 

                    2011 12:00 AM    B12 Injection Ndc#[address]-52  [facility]    Reviewed

 

                    2015 12:00 AM    THER/PROPH/DIAG INJ SC/IM    Reviewed

 

                    2015 12:00 AM    B12 Injection, Up to 1000 Mcg NDC#4856-9888-69    Reviewed

 

                    2015 12:00 AM    COMPLETE CBC W/AUTO DIFF WBC    Reviewed

 

                    2015 12:00 AM    COMPREHEN METABOLIC PANEL    Reviewed

 

                    2015 12:00 AM    LIPID PANEL         Reviewed

 

                    2015 12:00 AM    ASSAY OF LITHIUM    Reviewed

 

                    2011 12:00 AM    VIT D 1 25-DIHYDROXY    Reviewed

 

                    2011 12:00 AM    VITAMIN B-12        Reviewed

 

                    2015 12:00 AM    B12 Injection, Up to 1000 Mcg NDC#3596-1933-95    Reviewed

 

                    2015 12:00 AM    THER/PROPH/DIAG INJ SC/IM    Reviewed

 

                    2015 12:00 AM    B12 Injection, Up to 1000 Mcg NDC#4511-1531-45    Reviewed

 

                    2011 12:00 AM    THER/PROPH/DIAG INJ SC/IM    Reviewed

 

                    2011 12:00 AM    B12 Injection ([facility]) Ndc#5156-0869-55    Reviewed

 

                    2015 12:00 AM    THER/PROPH/DIAG INJ SC/IM    Reviewed

 

                    2015 12:00 AM    B12 Injection, Up to 1000 Mcg NDC#0251-4933-43    Reviewed







Results Summary







                          Data and Description      Results

 

                          2004 12:00 AM        Colonoscopy-Women and Men over 50 Normal 

 

                          2008 12:00 AM         Pap Smear Declined 

 

                          10/7/2009 12:00 AM        Cholest Cry Stone Ql .0 %LDLc SerPl-mCnc 123.0 mg/dLHDLc

 SerPl-mCnc 34.0 mg/dLTrigl SerPl-mCnc 190.0 mg/dLGlucose SerPl-mCnc 78.0 mg/dL

 

                          2009 12:00 AM        Mammogram -Women over 40 Normal HIV1+2 Ab Ser Ql no risk 

 

                          2010 8:47 AM         Dexa Bone Scan Refused Aspirin reccommended Contraindication 



 

                          2010 8:48 AM         Depression Done 

 

                          2010 12:00 AM         Foot Exam-Diabetic Done 

 

                          2010 12:00 AM         Cholest Cry Stone Ql .0 %LDLc SerPl-mCnc 126.0 mg/dLGlucose

 SerPl-mCnc 102.0 mg/dL

 

                          2010 8:45 AM          TRIGLYCERIDES 122.0 mg/dLCHOLESTEROL 186.0 mg/dLHDL 36.0 mg/dLLDL

 (CALC) 126.0 mg/dLGLUCOSE 102.0 mg/dLSODIUM 143.0 mmol/LPOTASSIUM 3.70 
mmol/LCHLORIDE 111.0 mmol/LCO2 23.0 mmol/LBUN 10.0 mg/dLCREATININE 0.80 
mg/dLSGOT/AST 12.0 IU/LSGPT/ALT 11.0 IU/LALK PHOS 65.0 IU/LTOTAL PROTEIN 7.20 
g/dLALBUMIN 3.90 g/dLTOTAL BILI 0.50 mg/dLCALCIUM 10.20 mg/dLeGFR >60 
mL/min/1.73 m2WBC 5.7 RBC 3.26 HGB 10.60 g/dLHCT 31.70 %MCV 97.0 fLMCH 32.50 
pgMCHC 33.40 g/dLRDW CV 13.30 %MPV 9.70 fLPLT 287 %NEUT 62.90 %%LYMP 21.80 
%%MONO 9.90 %%EOS 5.0 %%BASO 0.40 %#NEUT 3.56 #LYMP 1.23 #MONO 0.56 #EOS 0.28 
#BASO 0.02 

 

                          2010 12:00 AM        Glucose SerPl-mCnc 96.0 mg/dLCholest Cry Stone Ql .0 %LDLc

 SerPl-mCnc 146.0 mg/dL

 

                          2010 8:26 AM         TRIGLYCERIDES 106.0 mg/dLCHOLESTEROL 199.0 mg/dLHDL 32.0 mg/dLLDL

 (CALC) 146.0 mg/dLGLUCOSE 96.0 mg/dLSODIUM 143.0 mmol/LPOTASSIUM 4.0 
mmol/LCHLORIDE 113.0 mmol/LCO2 24.0 mmol/LBUN 13.0 mg/dLCREATININE 1.0 
mg/dLSGOT/AST 11.0 IU/LSGPT/ALT 6.0 IU/LALK PHOS 56.0 IU/LTOTAL PROTEIN 6.60 
g/dLALBUMIN 3.80 g/dLTOTAL BILI 0.50 mg/dLCALCIUM 9.30 mg/dLeGFR 57 

 

                          10/6/2010 12:00 AM        Cholest Cry Stone Ql .0 %LDLc SerPl-mCnc 111.0 mg/dLGlucose

 SerPl-mCnc 81.0 mg/dL

 

                          10/6/2010 2:45 PM         TRIGLYCERIDES 123.0 mg/dLCHOLESTEROL 178.0 mg/dLHDL 42.0 mg/dLLDL

 (CALC) 111.0 mg/dLGLUCOSE 81.0 mg/dLSODIUM 139.0 mmol/LPOTASSIUM 4.10 
mmol/LCHLORIDE 106.0 mmol/LCO2 24.0 mmol/LBUN 13.0 mg/dLCREATININE 0.90 
mg/dLSGOT/AST 13.0 IU/LSGPT/ALT 11.0 IU/LALK PHOS 61.0 IU/LTOTAL PROTEIN 7.10 
g/dLALBUMIN 3.90 g/dLTOTAL BILI 0.30 mg/dLCALCIUM 9.30 mg/dLeGFR >60 mL/min/1.73
 m2WBC 6.9 RBC 3.59 HGB 11.50 g/dLHCT 35.30 %MCV 98.0 fLMCH 32.0 pgMCHC 32.60 
g/dLRDW CV 12.90 %MPV 9.90 fLPLT 311 %NEUT 64.90 %%LYMP 22.50 %%MONO 7.20 %%EOS 
5.10 %%BASO 0.30 %#NEUT 4.45 #LYMP 1.54 #MONO 0.49 #EOS 0.35 #BASO 0.02 

 

                          2011 12:00 AM         Mammogram -Women over 40 Ordered 

 

                          2011 10:25 AM        TRIGLYCERIDES 111.0 mg/dLCHOLESTEROL 195.0 mg/dLHDL 43.0 mg/dLLDL

 (CALC) 130.0 mg/dLWBC 5.3 RBC 3.76 HGB 12.0 g/dLHCT 37.80 %.0 fLMCH 
31.90 pgMCHC 31.70 g/dLRDW CV 13.0 %MPV 9.70 fLPLT 259 %NEUT 69.0 %%LYMP 17.60 
%%MONO 8.30 %%EOS 4.70 %%BASO 0.40 %#NEUT 3.63 #LYMP 0.93 #MONO 0.44 #EOS 0.25 
#BASO 0.02 GLUCOSE 102.0 mg/dLSODIUM 146.0 mmol/LPOTASSIUM 4.20 mmol/LCHLORIDE 
113.0 mmol/LCO2 23.0 mmol/LBUN 15.0 mg/dLCREATININE 1.0 mg/dLSGOT/AST 12.0 
IU/LSGPT/ALT 17.0 IU/LALK PHOS 60.0 IU/LTOTAL PROTEIN 6.90 g/dLALBUMIN 4.20 
g/dLTOTAL BILI 0.40 mg/dLCALCIUM 9.70 mg/dLeGFR 57 

 

                          2011 11:49 AM        Cholest Cry Stone Ql .0 %LDLc SerPl-mCnc 130.0 mg/dLHDLc

 SerPl-mCnc 43.0 mg/dLTrigl SerPl-mCnc 111.0 mg/dLGlucose SerPl-mCnc 102.0 mg/dL

 

                          2011 11:52 AM        Pap Smear Declined 

 

                          2011 11:28 AM        Lithium 2.080 mmol/LGLUCOSE 102.0 mg/dLSODIUM 135.0 mmol/LPOTASSIUM

 3.90 mmol/LCHLORIDE 106.0 mmol/LCO2 21.0 mmol/LBUN 12.0 mg/dLCREATININE 1.30 
mg/dLCALCIUM 10.70 mg/dLeGFR 42 

 

                          2011 8:58 AM          Lithium 0.690 mmol/L

 

                          2011 2:38 PM         VITAMIN B12 3483.0 pg/mL

 

                          2013 3:35 PM          WBC 5.1 RBC 3.73 HGB 11.70 g/dLHCT 36.40 %MCV 98.0 fLMCH 31.40

 pgMCHC 32.10 g/dLRDW CV 13.10 %MPV 9.80 fLPLT 224 %NEUT 66.80 %%LYMP 19.10 
%%MONO 9.0 %%EOS 4.90 %%BASO 0.20 %#NEUT 3.42 #LYMP 0.98 #MONO 0.46 #EOS 0.25 
#BASO 0.01 GLUCOSE 88.0 mg/dLSODIUM 141.0 mmol/LPOTASSIUM 4.10 mmol/LCHLORIDE 
110.0 mmol/LCO2 22.0 mmol/LBUN 22.0 mg/dLCREATININE 1.10 mg/dLCALCIUM 9.80 
mg/dLeGFR 50 Lithium 0.760 mmol/L

 

                          2013 11:02 AM        TRIGLYCERIDES 106.0 mg/dLCHOLESTEROL 181.0 mg/dLHDL 46.0 mg/dLLDL

 (CALC) 114.0 mg/dLVITAMIN D 41.10 ng/mL

 

                          10/17/2014 10:10 AM       WBC 5.0 RBC 3.66 HGB 11.60 g/dLHCT 36.80 %.0 fLMCH 31.70

 pgMCHC 31.50 g/dLRDW CV 13.50 %MPV 10.10 fLPLT 209 %NEUT 69.20 %%LYMP 21.0 
%%MONO 6.40 %%EOS 3.20 %%BASO 0.20 %#NEUT 3.46 #LYMP 1.05 #MONO 0.32 #EOS 0.16 
#BASO 0.01 GLUCOSE 100.0 mg/dLSODIUM 148.0 mmol/LPOTASSIUM 3.90 mmol/LCHLORIDE 
114.0 mmol/LCO2 26.0 mmol/LBUN 12.0 mg/dLCREATININE 1.20 mg/dLSGOT/AST 9.0 
IU/LSGPT/ALT <6 IU/LALK PHOS 82.0 IU/LTOTAL PROTEIN 6.90 g/dLALBUMIN 4.0 
g/dLTOTAL BILI 0.40 mg/dLCALCIUM 10.50 mg/dLeGFR 45 TRIGLYCERIDES 96.0 
mg/dLCHOLESTEROL 155.0 mg/dLHDL 38.0 mg/dLLDL (CALC) 98.0 mg/dLLithium 0.850 
mmol/LCancer Antigen (CA) 125 8.30 U/mL

 

                          2015 10:25 AM        Lithium 0.790 mmol/LWBC 4.8 RBC 3.44 HGB 11.0 g/dLHCT 35.20 

%.0 fLMCH 32.0 pgMCHC 31.30 g/dLRDW CV 14.0 %MPV 9.30 fLPLT 210 %NEUT 
70.80 %%LYMP 17.20 %%MONO 8.10 %%EOS 3.50 %%BASO 0.40 %#NEUT 3.41 #LYMP 0.83 
#MONO 0.39 #EOS 0.17 #BASO 0.02 TRIGLYCERIDES 107.0 mg/dLCHOLESTEROL 174.0 
mg/dLHDL 43.0 mg/dLLDL (CALC) 110.0 mg/dLGLUCOSE 90.0 mg/dLSODIUM 145.0 
mmol/LPOTASSIUM 3.80 mmol/LCHLORIDE 115.0 mmol/LCO2 24.0 mmol/LBUN 17.0 mg
/dLCREATININE 1.30 mg/dLSGOT/AST 18.0 IU/LSGPT/ALT 17.0 IU/LALK PHOS 56.0 
IU/LTOTAL PROTEIN 6.70 g/dLALBUMIN 3.90 g/dLTOTAL BILI 0.40 mg/dLCALCIUM 9.80 
mg/dLeGFR 41 







History Of Immunizations







       Name    Date Admin    Mfg Name    Mfg Code    Trade Name    Lot#    Route    Inj    Vis Given    Vis

 Pub                                    CVX

 

        Influenza    2008    Not Entered    NE      Not Entered            Not Entered    Not Entered

                    1            999

 

          Pneumococcal    2008    [employer]    MSD       Pneumovax 23              Intramuscular

                Not Entered     1        999

 

           Influenza    10/15/2010    [employer]    NOV        Fluvirin > 12 Years    

[id number]     Intramuscular    Left Deltoid    10/15/2010    2009    999

 

          Pneumococcal    3/23/2015    [facility]    [facility]       Prevnar 13    [id number]    Intramuscular

                Right Gluteous Medius    3/23/2015       2013       109







History of Past Illness







                    Name                Date of Onset       Comments

 

                    Peritoneal Neoplasm, Malignant                         

 

                    Hyperlipidemia                           

 

                    Bipolar disorder, unspecified                         

 

                    Artificial opening status; colostomy                         

 

                    B12 deficiency                           

 

                    Anemia, Pernicious                         

 

                    Arthritis unspecified                         

 

                    cervical cancer                          

 

                    Artificial opening status; colostomy    2010  1:10PM     

 

                    Bipolar disorder, unspecified    2010  1:10PM     

 

                    Hyperlipidemia      2010  1:10PM     

 

                    Anemia, Pernicious    2010  1:10PM     

 

                    Postoperative Follow-Up    2010  1:55PM     

 

                    Postoperative Follow-Up    Mar  8 2010 10:57AM     

 

                    Artificial opening status; colostomy    Mar  8 2010  1:19PM     

 

                    Peritoneal Neoplasm, Malignant    Mar  8 2010  1:19PM     

 

                    Artificial opening status; colostomy    2010  1:40PM     

 

                    Hyperlipidemia      2010  1:40PM     

 

                    Anemia, Pernicious    2010  1:40PM     

 

                    Peritoneal Neoplasm, Malignant    2010  1:40PM     

 

                    Arthritis unspecified    2010  1:40PM     

 

                    Anemia of Chronic Illness    2010  1:40PM     

 

                    Tinea corporis      2010  3:17PM     

 

                    Bipolar disorder, unspecified    2010  1:33PM     

 

                    Hyperlipidemia      2010  1:33PM     

 

                    Anemia, Pernicious    2010  1:33PM     

 

                    Peritoneal Neoplasm, Malignant    2010  1:33PM     

 

                    B12 deficiency      2010  1:33PM     

 

                    Ethmoidal Sinusitis, Acute    Sep 21 2010  3:53PM     

 

                    Wheezing            Sep 21 2010  3:53PM     

 

                    Flu                 Oct 15 2010  1:40PM     

 

                    Bipolar disorder, unspecified    Oct 15 2010  1:42PM     

 

                    Hyperlipidemia      Oct 15 2010  1:42PM     

 

                    Anemia, Pernicious    Oct 15 2010  1:42PM     

 

                    Peritoneal Neoplasm, Malignant    Oct 15 2010  1:42PM     

 

                    Bipolar disorder, unspecified    2011 12:01PM     

 

                    Hyperlipidemia      2011 12:01PM     

 

                    Anemia, Pernicious    2011 12:01PM     

 

                    Peritoneal Neoplasm, Malignant    2011 12:01PM     

 

                    Bipolar disorder, unspecified    Apr 15 2011 10:55AM     

 

                    Major Depression    2011 10:11AM     

 

                    Bipolar Disorder    2011 10:11AM     

 

                    Cancer              May 10 2011  4:16PM     

 

                    Major Depression    May 10 2011  3:16PM     

 

                    Bipolar Disorder    May 10 2011  3:16PM     

 

                    Hypercalcemia       May 23 2011  2:47PM     

 

                    Bipolar disorder, unspecified    May 23 2011  2:47PM     

 

                    Colon Cancer, Personal History    May 23 2011  2:47PM     

 

                    Bipolar Disorder    May 31 2011  4:39PM     

 

                    Depressive Disorder    2011 10:01AM     

 

                    Vitamin B12 deficiency    2011 10:01AM     

 

                    Vitamin D Deficiency    2011  5:07PM     

 

                    Anemia, Vitamin B12 Deficiency    2011  5:07PM     

 

                    B12 deficiency      2011  3:56PM     

 

                    Routine gynecological examination    Aug  4 2011  9:08AM     

 

                    Screening Examination for Breast Cancer    Aug  4 2011  9:08AM     

 

                    Tinea Corporis      Aug  4 2011  9:08AM     

 

                    Depressive Disorder    Sep 23 2011  8:47AM     

 

                    Contact Dermatitis    Sep 23 2011  8:47AM     

 

                    Anemia, Pernicious    Sep 23 2011  8:47AM     

 

                    B12 deficiency      Sep 23 2011  8:47AM     

 

                    B12 deficiency      Sep 27 2011  2:58PM     

 

                    B12 deficiency      Oct 20 2011  2:34PM     

 

                    Flu                 Dec  9 2011  3:16PM     

 

                    B12 deficiency      Dec  9 2011  3:17PM     

 

                    B12 deficiency      2012  4:52PM     

 

                    B12 deficiency      2012 11:10AM     

 

                    B12 deficiency      2012  3:37PM     

 

                    B12 deficiency      May  3 2012  4:10PM     

 

                    B12 deficiency      2012  2:54PM     

 

                    B12 deficiency      2012 11:23AM     

 

                    B12 deficiency      Aug  9 2012  2:08PM     

 

                    B12 deficiency      Sep  6 2012  4:36PM     

 

                    B12 deficiency      Oct 16 2012 10:23AM     

 

                    Flu                 2013  3:11PM     

 

                    Bipolar disorder, unspecified    b  2013  2:48PM     

 

                    Anemia, Pernicious    2013  2:48PM     

 

                    B12 deficiency      Fe2013  2:48PM     

 

                    Extrapyramidal abnormal movement disorder    Feb  2013  2:48PM     

 

                    B12 deficiency      Apr  3 2013 12:03PM     

 

                    Bipolar disorder, unspecified    May  7 2013  1:31PM     

 

                    Anemia, Pernicious    May  7 2013  1:31PM     

 

                    B12 deficiency      May  7 2013  1:31PM     

 

                    Extrapyramidal abnormal movement disorder    May  7 2013  1:31PM     

 

                    B12 deficiency      2013  3:42PM     

 

                    B12 deficiency      2013  1:31PM     

 

                    Hyperlipidemia      Aug  7 2013 10:37AM     

 

                    Vitamin D Deficiency    Aug  7 2013 10:37AM     

 

                    Bipolar disorder, unspecified    Aug  7 2013 10:37AM     

 

                    Anemia, Pernicious    Aug  7 2013 10:37AM     

 

                    B12 deficiency      Aug  7 2013 10:37AM     

 

                    B12 deficiency      Sep 25 2013 11:15AM     

 

                    B12 deficiency      Dec 11 2013  3:16PM     

 

                    B12 deficiency      Mar  6 2014  1:48PM     

 

                    B12 deficiency      May 21 2014  3:17PM     

 

                    Screening Examination for Breast Cancer    2014  3:23PM     

 

                    Periumbilical abdominal pain    2014  3:23PM     

 

                    B12 deficiency      Jul 10 2014  2:52PM     

 

                    Anemia, Vitamin B12 Deficiency    Aug 13 2014  4:50PM     

 

                    Bipolar disorder    Oct 16 2014 11:13AM     

 

                    Hyperlipidemia      Oct 16 2014 11:13AM     

 

                    Anemia, Pernicious    Oct 16 2014 11:13AM     

 

                    Peritoneal Neoplasm, Malignant    Oct 16 2014 11:13AM     

 

                    Screening breast examination    Oct 16 2014 11:13AM     

 

                    Weight loss         Oct 16 2014 11:13AM     

 

                    Anemia, Pernicious    Mar 23 2015  2:57PM     

 

                    B12 deficiency      Mar 23 2015  2:57PM     

 

                    Need for Prevnar vaccine    Mar 23 2015  2:57PM     

 

                    Bipolar disorder    Mar 23 2015  2:57PM     

 

                    Hyperlipidemia      Mar 23 2015  2:57PM     

 

                    Anemia, Pernicious    Mar 23 2015  2:57PM     

 

                    Peritoneal Neoplasm, Malignant    Mar 23 2015  2:57PM     

 

                    B12 deficiency      May  4 2015  4:48PM     

 

                    Hyperlipidemia      May 13 2015  9:56AM     

 

                    Anemia              May 13 2015  9:56AM     

 

                    Bipolar disorder    May 13 2015  9:56AM     

 

                    Bipolar disorder    May 14 2015  3:27PM     

 

                    Hyperlipidemia      May 14 2015  3:27PM     

 

                    Anemia, Pernicious    May 14 2015  3:27PM     

 

                    Peritoneal Neoplasm, Malignant    May 14 2015  3:27PM     

 

                    B12 deficiency      2015  2:20PM     

 

                    B12 deficiency      2015 11:34AM     

 

                    B12 deficiency      Aug 18 2015  9:06AM     

 

                    Tinea Corporis      Sep 18 2015  8:54AM     

 

                    B12 deficiency      Sep 18 2015  8:54AM     

 

                    B12 deficiency      2015 10:28AM     







Payers







           Insurance Name    Company Name    Plan Name    Plan Number    Policy Number    Policy Group

 Number                                 Start Date

 

                    Human    [employer]              [id number]              N/A

 

                    Medicare Part A    Medicare Part A              [id number]              N/A

 

                    Medicare Part B    [employer] [place]              [id number]              2006

 

                          [employer]    [employer]                 50 percent

                                                    2009







History of Encounters







                    Visit Date          Visit Type          Provider

 

                    2015          Nurse visit         Bhupinder Louise DO

 

                    2015           Office visit        Bhupinder Louise DO

 

                    2015           Nurse visit         Bhupinder Aspen DO

 

                    2015            Nurse visit         Bhupinder Aspen DO

 

                    2015            Nurse visit         Bhupinder Aspen DO

 

                    2015           Office visit        Bhupinder Aspen DO

 

                    2015            Nurse visit         Bhupinder Aspen DO

 

                    3/23/2015           Office visit        Bhupinder Aspen DO

 

                    10/16/2014          Office visit        Bhupinder Aspen DO

 

                    2014           Nurse visit         Radha GREEN

 

                    7/10/2014           Nurse visit         Bhupinder Aspen DO

 

                    2014           Office visit        Bhupinder Aspen DO

 

                    2014           Nurse visit         Bhupinder Aspen DO

 

                    3/6/2014            Nurse visit         Bhupinder Aspen DO

 

                    2014            [facility]            EARNEST Lopez MD

 

                    2013          Nurse visit         Bhupinder Aspen DO

 

                    2013           Nurse visit         Bhupinder Aspen DO

 

                    2013            Office visit        Bhupinder Aspen DO

 

                    2013            Nurse visit         Bhupinder Aspen DO

 

                    2013            Nurse visit         Bhupinder Aspen DO

 

                    2013            Office visit        Bhupinder Louise DO

 

                    4/3/2013            Nurse visit         Bhupinder Aspen DO

 

                    2013            Office visit        Bhupinder Louise DO

 

                    10/16/2012          Nurse visit         Bhupinder Louise DO

 

                    2012            Nurse visit         Bhupinder Aspen DO

 

                    2012            Voided              Bhupinder Aspen DO

 

                    2012            Nurse visit         Bhupinder Aspen DO

 

                    2012            Nurse visit         Bhupinder Aspen DO

 

                    2012           Nurse visit         Bhupinder Aspen DO

 

                    5/3/2012            Nurse visit         Bhupinder Aspen DO

 

                    2012           Nurse visit         Bhupinder Aspen DO

 

                    2012           Nurse visit         Bhupinder Aspen DO

 

                    2012           Nurse visit         Bhupinder Aspen DO

 

                    2011           Nurse visit         Bhupinder Aspen DO

 

                    10/20/2011          Nurse visit         Bhupinder Aspen DO

 

                    2011           Office visit        Bhupinder Aspen DO

 

                    2011           Nurse visit         Radha GREEN

 

                    2011            Office visit        Bhupinder Aspen DO

 

                    2011           Nurse visit         Bhupinder Aspen DO

 

                    2011            Office visit        Bhupinder Aspen DO

 

                    2011           Office visit        Bhupinder Aspen DO

 

                    5/10/2011           Office visit        Bhupinder Aspen DO

 

                    2011           Office visit        Bhupinder Aspen DO

 

                    4/15/2011           Office visit        Devin Angel DO

 

                    2011           Office visit        Devin Angel DO

 

                    10/15/2010          Office visit        Devin Angel DO

 

                    2010           Office visit        Devin Angel DO

 

                    2010            Office visit        Devin Angel DO

 

                    2010           Office visit        Devin Angel DO

 

                    2010            Office visit        Devin Angel DO

 

                    3/8/2010            Office visit        Devin Masterson MD

 

                    2010            Surgery             Devin Masterson MD

 

                    2010            Office visit        Devin Angel DO

 

                    2010           Surgery             Devin Masterson MD

 

                    2010           Hospital            Devin Masterson MD

 

                    2010           Hospital            Devin Masterson MD

 

                    10/22/2009          Office visit        Devin Angel DO

## 2019-06-26 NOTE — XMS REPORT
MU2 Ambulatory Summary

                             Created on: 2016



Pauline Gan

External Reference #: [id number]

: 1950

Sex: Female



Demographics







                          Address                   [address]

[place]  [id number]

 

                          Home Phone                [phone]

 

                          Preferred Language        English

 

                          Marital Status            Legally 

 

                          Pentecostalism Affiliation     Unknown

 

                          Race                      White

 

                          Ethnic Group              Not  or 





Author







                          Author                    Bhupinder Louise

 

                          [facility]

 

                          Address                   [address]

[place]  [id number]



 

                          Phone                     [phone]







Care Team Providers







                    Care Team Member Name    Role                Phone

 

                    Bhupinder Louise    PCP                 Unavailable







Allergies and Adverse Reactions







                    Name                Reaction            Notes

 

                    NO KNOWN DRUG ALLERGIES                         







Plan of Treatment







             Planned Activity    Comments     Planned Date    Planned Time    Plan/Goal

 

             VITAMIN B-12                 3/14/2016    12:00 AM      

 

             COMPLETE CBC W/AUTO DIFF WBC                 2013     12:00 AM      

 

             ASSAY OF LITHIUM                 2013     12:00 AM      

 

             METABOLIC PANEL TOTAL CA                 2013     12:00 AM      

 

             VITAMIN B-12                 2013     12:00 AM      

 

             ASSAY OF FOLIC ACID SERUM                 2013     12:00 AM      

 

             THER/PROPH/DIAG INJ SC/IM                 2013    12:00 AM      







Medications







                                        Active 

 

             Name         Start Date    Estimated Completion Date    SIG          Comments

 

                    syringe with needle (disp) 1 mL 25 gauge x 1" miscellaneous syringe    2011             

                          use for injection once a month     

 

                Latuda 20 mg oral tablet                                    take 1 tablet (20 mg) by oral route once daily with

 food (at least 350 calories)            

 

             pravastatin 40 mg oral tablet    3/30/2015                 TAKE 1 TABLET BY MOUTH DAILY     

 

                Namenda XR 28 mg oral capsule,sprinkle,ER 24hr    2015                       take 1 capsule (28

 mg) by oral route once daily            

 

                prednisone 10 mg oral tablet    12/3/2015                       take 2 tablets (20 mg) by oral route

 once daily for 4 days 1 tablet daily for 4 days 0.5 tablet daily for 4 days     









                                         

 

             Name         Start Date    Expiration Date    SIG          Comments

 

             Reglan 10mg    3/29/2010    2010    one ac and hs     

 

                Keflex 500 mg oral capsule    2010       10/1/2010       take 1 capsule (500 mg) by oral

 route every 6 hours for 10 days         

 

                Bactrim -160 mg oral tablet    2011       take 1 tablet by oral route

 every 12 hours for 7 days               

 

                triamcinolone acetonide 0.1 % topical cream    2011      apply a thin

 layer to the affected area(s) by topical route 2 times per day     

 

                sertraline 100 mg oral tablet    4/10/2012       5/10/2012       take 1.5 tablets by oral route

 daily for 30 days                       

 

                ergocalciferol (vitamin D2) 50,000 unit oral capsule    4/15/2013       2013       TAKE

 ONE CAPSULE BY MOUTH ONCE A WEEK        

 

                CYANOCOBALAM 1000MCGINJ 1000 milliliter    2013       INJECT 1ML INTRAMUSCULAR

 ONCE A MONTH                            

 

                pravastatin 40 mg oral tablet    3/25/2014       3/20/2015       TAKE ONE TABLET BY MOUTH EVERY

 DAY                                     

 

                          Zostavax (PF) 19,400 unit/0.65 mL subcutaneous suspension for reconstitution    3/23/2015

                    3/24/2015           inject 0.65 milliliter by subcutaneous route once     

 

                lithium carbonate 300 mg oral capsule    2015       take 1 capsule (300

 mg) by oral route 2 for 30 days         

 

                famciclovir 500 mg oral tablet    12/3/2015       12/10/2015      take 1 tablet (500 mg) by

 oral route every 8 hours for 7 days     









                                        Discontinued 

 

             Name         Start Date    Discontinued Date    SIG          Comments

 

                Tylenol 325 mg oral tablet                    2013        take 1 - 2 tablets (325 -650 mg) by oral

 route every 4-6 hours as needed         

 

                Calcium 600 + D(3) 600 mg(1,500mg) -400 unit oral tablet                    2011       take 1 tablet

 by oral route 2 times a day            no longer taking

 

                Vitamin B-12 1,000 mcg oral tablet extended release    2010       take 1

 tablet by oral route daily             no longer taking

 

                Antifungal (clotrimazole) 1 % topical cream    2010       apply to the 

affected and surrounding areas of skin by topical route 2 times per day morning 
and evening                              

 

                sertraline 100 mg oral tablet    5/10/2011       2011       take 2 tablets (200 mg) by 

oral route once daily                   discontinued by Dr. Serrano

 

                mirtazapine 15 mg oral tablet                    2011        take 1 tablet (15 mg) by oral route 

once daily before bedtime               Dr. Serrano

 

                mirtazapine 15 mg oral tablet                    2011        take 1 tablet (15 mg) by oral route 

once daily before bedtime               jose manuel'iker by Dr. Serrano

 

                Pristiq 50 mg oral tablet extended release 24 hr                    2013        take 1 tablet (50

 mg) by oral route once daily           Dr. Serrano

 

                Pristiq 50 mg oral tablet extended release 24 hr                    2013        take 1 tablet (50

 mg) by oral route once daily           dose updated

 

                Vitamin B-12 1,000 mcg/mL injection solution    2011        inject 1 milliliter

 (1,000 mcg) by intramuscular route once a month    on list already

 

                clotrimazole 1 % topical cream    2011        apply to the affected and surrounding

 areas of skin by topical route 2 times per day in the morning and evening     

 

                Vitamin D2 50,000 unit oral capsule    2011        take 1 capsule (50,000

 unit) by oral route once weekly        generic on list

 

                Pravachol 40 mg oral tablet    2012        take 1 tablet (40 mg) by oral 

route once daily for 90 days            generic on list

 

                lithium carbonate 300 mg oral capsule    2012        take 1 capsule by oral

 route daily                            dose updated

 

                Pristiq 100 mg oral tablet extended release 24 hr                    4/10/2012       take 1 and 1/2 

tablet (150 mg) by oral route once daily    Mental Health provider

 

                Pristiq 100 mg oral tablet extended release 24 hr                    4/10/2012       take 1 and 1/2 

tablet (150 mg) by oral route once daily    Discontinued by Dr Efrain Knight at [facility]

 

                hydroxyzine HCl 50 mg oral tablet    10/16/2014      2015       take 1 tablet (50 mg) 

by oral route at bedtime                 

 

                fluconazole 100 mg oral tablet    2015       12/3/2015       take 1 tablet (100 mg) by 

oral route once a week                   

 

                ketoconazole 2 % topical cream    2015       12/3/2015       apply to the affected area(s)

 by topical route 2 times per day        







Problem List







                    Description         Status              Onset

 

                    Artificial opening status; colostomy    Active               

 

                    Bipolar disorder, unspecified    Active               

 

                    Hyperlipidemia      Active               

 

                    Peritoneal Neoplasm, Malignant    Active               

 

                    Anemia, Pernicious    Active               

 

                    Arthritis unspecified    Active               

 

                    B12 deficiency      Active               







Vital Signs







      Date    Time    BP-Sys(mm[Hg]    BP-Lynn(mm[Hg])    HR(bpm)    RR(rpm)    Temp    WT    HT    HC    BMI

                    BSA                 BMI Percentile      O2 Sat(%)

 

        12/3/2015    9:50:00 AM    132 mmHg    70 mmHg    62 bpm    16 rpm    97.9 F    145 lbs    69 in

                          21.41 kg/m2    1.79 m2                   100 %

 

        2015    8:52:00 AM    132 mmHg    68 mmHg    52 bpm    20 rpm    97.8 F    141 lbs    69 in

                          20.8218 kg/m    1.7645 m                 100 %

 

        2015    3:25:00 PM    120 mmHg    62 mmHg    72 bpm    16 rpm    98.1 F    136 lbs    69 in

                          20.08 kg/m2    1.73 m2                   98 %

 

       3/23/2015    2:55:00 PM    130 mmHg    76 mmHg    68 bpm    18 rpm    97 F    140 lbs    69 in    

                20.6742 kg/m    1.7583 m                      98 %

 

        10/16/2014    11:11:00 AM    120 mmHg    66 mmHg    77 bpm    20 rpm    98 F    130 lbs    69 in

                          19.20 kg/m2    1.69 m2                   100 %

 

        2014    3:21:00 PM    130 mmHg    66 mmHg    63 bpm    18 rpm    97.2 F    160 lbs    69 in

                          23.6276 kg/m    1.8797 m                 99 %

 

        2013    10:35:00 AM    132 mmHg    70 mmHg    66 bpm    20 rpm    98.1 F    157 lbs    69 in

                          23.18 kg/m2    1.86 m2                    

 

        2013    1:29:00 PM    132 mmHg    70 mmHg    76 bpm    18 rpm    98.2 F    166 lbs    69 in 

                          24.5137 kg/m    1.9146 m                  

 

       2013    2:46:00 PM    128 mmHg    70 mmHg    76 bpm    16 rpm    98 F    160 lbs    69 in     

                23.63 kg/m2     1.88 m2                          

 

        2011    8:49:00 AM    128 mmHg    78 mmHg    70 bpm    18 rpm    97.9 F    164 lbs    69 in

                          24.2183 kg/m    1.903 m                  

 

     2011    1:31:00 PM    132 mmHg    68 mmHg    84 bpm         97 F    167 lbs                        

                                         

 

        2011    9:09:00 AM    128 mmHg    70 mmHg    72 bpm    18 rpm    98.2 F    163 lbs    64 in 

                          27.9786 kg/m    1.8272 m                  

 

       2011    10:01:00 AM    132 mmHg    70 mmHg    72 bpm    18 rpm    98.2 F    154 lbs             

                                                                 

 

       2011    2:47:00 PM    128 mmHg    70 mmHg    72 bpm    18 rpm    97.8 F    156 lbs             

                                                                 

 

       5/10/2011    3:16:00 PM    144 mmHg    80 mmHg    72 bpm    18 rpm    98.2 F    158 lbs             

                                                                 

 

        2011    10:11:00 AM    132 mmHg    70 mmHg    70 bpm    18 rpm    98.2 F    168 lbs    69 in

                          24.809 kg/m    1.9261 m                  

 

        4/15/2011    10:52:00 AM    110 mmHg    60 mmHg    75 bpm    16 rpm    97.5 F    172.375 lbs    

69 in                     25.46 kg/m2    1.95 m2                   100 %

 

        2011    11:43:00 AM    120 mmHg    82 mmHg    75 bpm    16 rpm    97.2 F    178.5 lbs    69

 in                       26.3596 kg/m    1.9854 m                 100 %

 

        10/15/2010    1:32:00 PM    120 mmHg    70 mmHg    80 bpm    18 rpm    96.6 F    177 lbs    69 in

                          26.14 kg/m2    1.98 m2                   100 %

 

        2010    3:50:00 PM    168 mmHg    100 mmHg    82 bpm    18 rpm    97.8 F    177.5 lbs    69

 in                       26.2119 kg/m    1.9798 m                 97 %

 

        2010    1:21:00 PM    140 mmHg    80 mmHg    59 bpm    16 rpm    97.6 F    173.25 lbs    69 

in                        25.58 kg/m2    1.96 m2                   100 %

 

        2010    3:02:00 PM    140 mmHg    80 mmHg    61 bpm    16 rpm    97.6 F    173.125 lbs    69

 in                       25.5658 kg/m    1.9553 m                 99 %

 

        2010    1:23:00 PM    130 mmHg    80 mmHg    66 bpm    16 rpm    96.8 F    173 lbs    69 in 

                          25.55 kg/m2    1.95 m2                   100 %

 

        2010    12:58:00 PM    130 mmHg    88 mmHg    75 bpm    16 rpm    98.4 F    172.25 lbs    69

 in                       25.4366 kg/m    1.9503 m                 100 %







Social History







                    Name                Description         Comments

 

                    denies alcohol use                         

 

                    denies smoking                           

 

                    Denies illicit substance abuse                         

 

                    retired                                 direct care

 

                    Single                                   

 

                    Exercises regularly                         

 

                    Attended some college                         

 

                    Tobacco             Never smoker         







History of Procedures







                    Date Ordered        Description         Order Status

 

                    2010 12:00 AM    COMPREHEN METABOLIC PANEL    Reviewed

 

                    2010 12:00 AM    COMPLETE CBC W/AUTO DIFF WBC    Reviewed

 

                    2010 12:00 AM    LIPID PANEL         Reviewed

 

                          2015 12:00 AM        B12 Injection, Up to 1000 Mcg NDC#4926-5777-80 [facility] 

                                        Reviewed

 

                    2011 12:00 AM    MAMMOGRAM SCREENING    Reviewed

 

                    2011 12:00 AM    CYTOPATH C/V THIN LAYER    Reviewed

 

                    2011 12:00 AM    B12 Injection 1 cc NDC#[address]-28    Reviewed

 

                    2015 12:00 AM    THER/PROPH/DIAG INJ SC/IM    Reviewed

 

                    2015 12:00 AM    B12 Injection, Up to 1000 Mcg NDC#1156-1733-35    Reviewed

 

                    2011 12:00 AM    THER/PROPH/DIAG INJ SC/IM    Reviewed

 

                    2011 12:00 AM    B12 Injection([place]) Ndc#1036-6524-71-    Reviewed

 

                    2015 12:00 AM    THER/PROPH/DIAG INJ SC/IM    Reviewed

 

                    2015 12:00 AM    B12 Injection, Up to 1000 Mcg NDC#5127-3296-81    Reviewed

 

                    10/20/2011 12:00 AM    THER/PROPH/DIAG INJ SC/IM    Reviewed

 

                    10/20/2011 12:00 AM    B12 Injection([place]) Ndc#0447-2737-97-    Reviewed

 

                    2016 12:00 AM    THER/PROPH/DIAG INJ SC/IM    Reviewed

 

                    2016 12:00 AM    B12 Injection, Up to 1000 Mcg NDC#2077-7848-23    Reviewed

 

                    2011 12:00 AM    ***Immunization administration, Medicare flu    Reviewed

 

                    2011 12:00 AM    Fluzone ** MEDICARE Only **    Reviewed

 

                    2011 12:00 AM    THER/PROPH/DIAG INJ SC/IM    Reviewed

 

                    2011 12:00 AM    B12 Injection ([facility]) Ndc#7256-2846-77    Reviewed

 

                    2012 12:00 AM    THER/PROPH/DIAG INJ SC/IM    Reviewed

 

                    2012 12:00 AM    B12 Injection ([facility]) Ndc#2663-9378-06    Reviewed

 

                    2012 12:00 AM    THER/PROPH/DIAG INJ SC/IM    Reviewed

 

                    2012 12:00 AM    B12 Injection([place]) Ndc#4816-9752-97-    Reviewed

 

                    5/3/2012 12:00 AM    THER/PROPH/DIAG INJ SC/IM    Reviewed

 

                    5/3/2012 12:00 AM    B12 Injection([place]) Ndc#8765-4640-67-    Reviewed

 

                    2012 12:00 AM    IMMUNOTHERAPY INJECTIONS    Reviewed

 

                    2012 12:00 AM    B12 Injection([place]) Ndc#7875-6508-04-    Reviewed

 

                    2012 12:00 AM    THER/PROPH/DIAG INJ SC/IM    Reviewed

 

                    2012 12:00 AM    B12 Injection, Up to 1000 Mcg NDC#3983-9034-84    Reviewed

 

                    2012 12:00 AM    THER/PROPH/DIAG INJ SC/IM    Reviewed

 

                    2012 12:00 AM    B12 Injection, Up to 1000 Mcg NDC#3996-3388-85    Reviewed

 

                    2012 12:00 AM    THER/PROPH/DIAG INJ SC/IM    Reviewed

 

                    2012 12:00 AM    B12 Injection, Up to 1000 Mcg NDC#5504-0160-95    Reviewed

 

                    10/16/2012 12:00 AM    THER/PROPH/DIAG INJ SC/IM    Reviewed

 

                    10/16/2012 12:00 AM    B12 Injection, Up to 1000 Mcg NDC#7481-4768-44    Reviewed

 

                    2010 12:00 AM    COMPREHEN METABOLIC PANEL    Reviewed

 

                    2010 12:00 AM    COMPLETE CBC W/AUTO DIFF WBC    Reviewed

 

                    2010 12:00 AM    LIPID PANEL         Reviewed

 

                    2013 12:00 AM    Flu Injection 3 Years And Above NDC# [address]-98  [facility]    Reviewed



 

                    2013 12:00 AM    COMPLETE CBC W/AUTO DIFF WBC    Reviewed

 

                    2013 12:00 AM    ASSAY OF LITHIUM    Reviewed

 

                    2013 12:00 AM    METABOLIC PANEL TOTAL CA    Reviewed

 

                    4/3/2013 12:00 AM    THER/PROPH/DIAG INJ SC/IM    Reviewed

 

                    4/3/2013 12:00 AM    B12 Injection, Up to 1000 Mcg NDC#1191-3436-69    Reviewed

 

                    2013 12:00 AM    THER/PROPH/DIAG INJ SC/IM    Reviewed

 

                    2013 12:00 AM    B12 Injection, Up to 1000 Mcg NDC#7592-4568-11    Reviewed

 

                    2013 12:00 AM    THER/PROPH/DIAG INJ SC/IM    Reviewed

 

                    2013 12:00 AM    B12 Injection, Up to 1000 Mcg NDC#3222-7494-91    Reviewed

 

                    2013 12:00 AM    LIPID PANEL         Reviewed

 

                    2013 12:00 AM    VITAMIN D 25 HYDROXY    Reviewed

 

                    2013 12:00 AM    THER/PROPH/DIAG INJ SC/IM    Reviewed

 

                    3/6/2014 12:00 AM    THER/PROPH/DIAG INJ SC/IM    Reviewed

 

                    2014 12:00 AM    THER/PROPH/DIAG INJ SC/IM    Reviewed

 

                    2014 12:00 AM    B12 Injection, Up to 1000 Mcg NDC#0196-1543-47    Reviewed

 

                    2010 12:00 AM    SKIN FUNGI CULTURE    Reviewed

 

                    10/9/2010 12:00 AM    COMPREHEN METABOLIC PANEL    Reviewed

 

                    10/9/2010 12:00 AM    LIPID PANEL         Reviewed

 

                    2010 12:00 AM    THER/PROPH/DIAG INJ SC/IM    Reviewed

 

                    2010 12:00 AM    B12 Injection Ndc#[address]-63 ([place])    Reviewed

 

                    2010 12:00 AM    THER/PROPH/DIAG INJ SC/IM    Reviewed

 

                    2010 12:00 AM    Kenalog 40 Mg Im-Ndc#617[address]-76 ([place])    Reviewed

 

                    10/15/2010 12:00 AM    FLU VACCINE 3 YRS & > IM    Reviewed

 

                    10/15/2010 12:00 AM    Admin.Of M/C Cov.Vaccine-Flu Vacc.    Reviewed

 

                    1/15/2011 12:00 AM    COMPLETE CBC W/AUTO DIFF WBC    Reviewed

 

                    1/15/2011 12:00 AM    COMPREHEN METABOLIC PANEL    Reviewed

 

                    1/15/2011 12:00 AM    LIPID PANEL         Reviewed

 

                    2014 12:00 AM    MAMMOGRAM SCREENING    Reviewed

 

                    2014 12:00 AM    Screening mammography, bilateral    Reviewed

 

                    7/10/2014 12:00 AM    THER/PROPH/DIAG INJ SC/IM    Reviewed

 

                    7/10/2014 12:00 AM    B12 Injection, Up to 1000 Mcg NDC#7843-1833-58    Reviewed

 

                    2011 12:00 AM    COMPLETE CBC W/AUTO DIFF WBC    Reviewed

 

                    2011 12:00 AM    COMPREHEN METABOLIC PANEL    Reviewed

 

                    2011 12:00 AM    LIPID PANEL         Reviewed

 

                    2014 12:00 AM    B12 Injection, Up to 1000 Mcg NDC#5752-7493-54    Reviewed

 

                    10/19/2014 12:00 AM    MAMMOGRAM SCREENING    Reviewed

 

                    10/19/2014 12:00 AM    Screening mammography, bilateral    Reviewed

 

                    10/16/2014 12:00 AM    COMPLETE CBC W/AUTO DIFF WBC    Reviewed

 

                    10/16/2014 12:00 AM    COMPREHEN METABOLIC PANEL    Reviewed

 

                    10/16/2014 12:00 AM    IMMUNOASSAY TUMOR     Reviewed

 

                    10/16/2014 12:00 AM    LIPID PANEL         Reviewed

 

                    10/16/2014 12:00 AM    ASSAY OF LITHIUM    Reviewed

 

                    10/16/2014 12:00 AM    MAMMOGRAM SCREENING    Reviewed

 

                    2011 12:00 AM    ASSAY OF PARATHORMONE    Reviewed

 

                    2011 12:00 AM    VITAMIN D 25 HYDROXY    Reviewed

 

                    2011 12:00 AM    ASSAY OF LITHIUM    Reviewed

 

                    2011 12:00 AM    METABOLIC PANEL TOTAL CA    Reviewed

 

                    2011 12:00 AM    CT HEAD/BRAIN W/O & W/DYE    Reviewed

 

                    3/23/2015 12:00 AM    PNEUMOCOCCAL VACC 13 GLENDY IM    Reviewed

 

                    3/23/2015 12:00 AM    Vitamin B12 injection    Reviewed

 

                    2011 12:00 AM    ASSAY OF LITHIUM    Reviewed

 

                    2011 12:00 AM    B12 Injection Ndc#[address]-13  [facility]    Reviewed

 

                    2015 12:00 AM    THER/PROPH/DIAG INJ SC/IM    Reviewed

 

                    2015 12:00 AM    B12 Injection, Up to 1000 Mcg NDC#4371-2975-03    Reviewed

 

                    2015 12:00 AM    COMPLETE CBC W/AUTO DIFF WBC    Reviewed

 

                    2015 12:00 AM    COMPREHEN METABOLIC PANEL    Reviewed

 

                    2015 12:00 AM    LIPID PANEL         Reviewed

 

                    2015 12:00 AM    ASSAY OF LITHIUM    Reviewed

 

                    2011 12:00 AM    VIT D 1 25-DIHYDROXY    Reviewed

 

                    2011 12:00 AM    VITAMIN B-12        Reviewed

 

                    2015 12:00 AM    B12 Injection, Up to 1000 Mcg NDC#1411-3981-95    Reviewed

 

                    2015 12:00 AM    THER/PROPH/DIAG INJ SC/IM    Reviewed

 

                    2015 12:00 AM    B12 Injection, Up to 1000 Mcg NDC#7018-7549-54    Reviewed

 

                    2011 12:00 AM    THER/PROPH/DIAG INJ SC/IM    Reviewed

 

                    2011 12:00 AM    B12 Injection ([facility]) Ndc#3075-1778-16    Reviewed

 

                    2015 12:00 AM    THER/PROPH/DIAG INJ SC/IM    Reviewed

 

                    2015 12:00 AM    B12 Injection, Up to 1000 Mcg NDC#1520-0339-82    Reviewed







Results Summary







                          Data and Description      Results

 

                          2004 12:00 AM        Colonoscopy-Women and Men over 50 Normal 

 

                          2008 12:00 AM         Pap Smear Declined 

 

                          10/7/2009 12:00 AM        Cholest Cry Stone Ql .0 %LDLc SerPl-mCnc 123.0 mg/dLHDLc

 SerPl-mCnc 34.0 mg/dLTrigl SerPl-mCnc 190.0 mg/dLGlucose SerPl-mCnc 78.0 mg/dL

 

                          2009 12:00 AM        Mammogram -Women over 40 Normal HIV1+2 Ab Ser Ql no risk 

 

                          2010 8:47 AM         Dexa Bone Scan Refused Aspirin reccommended Contraindication 



 

                          2010 8:48 AM         Depression Done 

 

                          2010 12:00 AM         Foot Exam-Diabetic Done 

 

                          2010 12:00 AM         Cholest Cry Stone Ql .0 %LDLc SerPl-mCnc 126.0 mg/dLGlucose

 SerPl-mCnc 102.0 mg/dL

 

                          2010 8:45 AM          TRIGLYCERIDES 122.0 mg/dLCHOLESTEROL 186.0 mg/dLHDL 36.0 mg/dLLDL

 (CALC) 126.0 mg/dLGLUCOSE 102.0 mg/dLSODIUM 143.0 mmol/LPOTASSIUM 3.70 
mmol/LCHLORIDE 111.0 mmol/LCO2 23.0 mmol/LBUN 10.0 mg/dLCREATININE 0.80 
mg/dLSGOT/AST 12.0 IU/LSGPT/ALT 11.0 IU/LALK PHOS 65.0 IU/LTOTAL PROTEIN 7.20 
g/dLALBUMIN 3.90 g/dLTOTAL BILI 0.50 mg/dLCALCIUM 10.20 mg/dLeGFR >60 
mL/min/1.73 m2WBC 5.7 RBC 3.26 HGB 10.60 g/dLHCT 31.70 %MCV 97.0 fLMCH 32.50 
pgMCHC 33.40 g/dLRDW CV 13.30 %MPV 9.70 fLPLT 287 %NEUT 62.90 %%LYMP 21.80 
%%MONO 9.90 %%EOS 5.0 %%BASO 0.40 %#NEUT 3.56 #LYMP 1.23 #MONO 0.56 #EOS 0.28 
#BASO 0.02 

 

                          2010 12:00 AM        Glucose SerPl-mCnc 96.0 mg/dLCholest Cry Stone Ql .0 %LDLc

 SerPl-mCnc 146.0 mg/dL

 

                          2010 8:26 AM         TRIGLYCERIDES 106.0 mg/dLCHOLESTEROL 199.0 mg/dLHDL 32.0 mg/dLLDL

 (CALC) 146.0 mg/dLGLUCOSE 96.0 mg/dLSODIUM 143.0 mmol/LPOTASSIUM 4.0 
mmol/LCHLORIDE 113.0 mmol/LCO2 24.0 mmol/LBUN 13.0 mg/dLCREATININE 1.0 
mg/dLSGOT/AST 11.0 IU/LSGPT/ALT 6.0 IU/LALK PHOS 56.0 IU/LTOTAL PROTEIN 6.60 
g/dLALBUMIN 3.80 g/dLTOTAL BILI 0.50 mg/dLCALCIUM 9.30 mg/dLeGFR 57 

 

                          10/6/2010 12:00 AM        Cholest Cry Stone Ql .0 %LDLc SerPl-mCnc 111.0 mg/dLGlucose

 SerPl-mCnc 81.0 mg/dL

 

                          10/6/2010 2:45 PM         TRIGLYCERIDES 123.0 mg/dLCHOLESTEROL 178.0 mg/dLHDL 42.0 mg/dLLDL

 (CALC) 111.0 mg/dLGLUCOSE 81.0 mg/dLSODIUM 139.0 mmol/LPOTASSIUM 4.10 
mmol/LCHLORIDE 106.0 mmol/LCO2 24.0 mmol/LBUN 13.0 mg/dLCREATININE 0.90 
mg/dLSGOT/AST 13.0 IU/LSGPT/ALT 11.0 IU/LALK PHOS 61.0 IU/LTOTAL PROTEIN 7.10 
g/dLALBUMIN 3.90 g/dLTOTAL BILI 0.30 mg/dLCALCIUM 9.30 mg/dLeGFR >60 mL/min/1.73
 m2WBC 6.9 RBC 3.59 HGB 11.50 g/dLHCT 35.30 %MCV 98.0 fLMCH 32.0 pgMCHC 32.60 
g/dLRDW CV 12.90 %MPV 9.90 fLPLT 311 %NEUT 64.90 %%LYMP 22.50 %%MONO 7.20 %%EOS 
5.10 %%BASO 0.30 %#NEUT 4.45 #LYMP 1.54 #MONO 0.49 #EOS 0.35 #BASO 0.02 

 

                          2011 12:00 AM         Mammogram -Women over 40 Ordered 

 

                          2011 10:25 AM        TRIGLYCERIDES 111.0 mg/dLCHOLESTEROL 195.0 mg/dLHDL 43.0 mg/dLLDL

 (CALC) 130.0 mg/dLWBC 5.3 RBC 3.76 HGB 12.0 g/dLHCT 37.80 %.0 fLMCH 
31.90 pgMCHC 31.70 g/dLRDW CV 13.0 %MPV 9.70 fLPLT 259 %NEUT 69.0 %%LYMP 17.60 
%%MONO 8.30 %%EOS 4.70 %%BASO 0.40 %#NEUT 3.63 #LYMP 0.93 #MONO 0.44 #EOS 0.25 
#BASO 0.02 GLUCOSE 102.0 mg/dLSODIUM 146.0 mmol/LPOTASSIUM 4.20 mmol/LCHLORIDE 
113.0 mmol/LCO2 23.0 mmol/LBUN 15.0 mg/dLCREATININE 1.0 mg/dLSGOT/AST 12.0 
IU/LSGPT/ALT 17.0 IU/LALK PHOS 60.0 IU/LTOTAL PROTEIN 6.90 g/dLALBUMIN 4.20 
g/dLTOTAL BILI 0.40 mg/dLCALCIUM 9.70 mg/dLeGFR 57 

 

                          2011 11:49 AM        Cholest Cry Stone Ql .0 %LDLc SerPl-mCnc 130.0 mg/dLHDLc

 SerPl-mCnc 43.0 mg/dLTrigl SerPl-mCnc 111.0 mg/dLGlucose SerPl-mCnc 102.0 mg/dL

 

                          2011 11:52 AM        Pap Smear Declined 

 

                          2011 11:28 AM        Lithium 2.080 mmol/LGLUCOSE 102.0 mg/dLSODIUM 135.0 mmol/LPOTASSIUM

 3.90 mmol/LCHLORIDE 106.0 mmol/LCO2 21.0 mmol/LBUN 12.0 mg/dLCREATININE 1.30 
mg/dLCALCIUM 10.70 mg/dLeGFR 42 

 

                          2011 8:58 AM          Lithium 0.690 mmol/L

 

                          2011 2:38 PM         VITAMIN B12 3483.0 pg/mL

 

                          2013 3:35 PM          WBC 5.1 RBC 3.73 HGB 11.70 g/dLHCT 36.40 %MCV 98.0 fLMCH 31.40

 pgMCHC 32.10 g/dLRDW CV 13.10 %MPV 9.80 fLPLT 224 %NEUT 66.80 %%LYMP 19.10 
%%MONO 9.0 %%EOS 4.90 %%BASO 0.20 %#NEUT 3.42 #LYMP 0.98 #MONO 0.46 #EOS 0.25 
#BASO 0.01 GLUCOSE 88.0 mg/dLSODIUM 141.0 mmol/LPOTASSIUM 4.10 mmol/LCHLORIDE 
110.0 mmol/LCO2 22.0 mmol/LBUN 22.0 mg/dLCREATININE 1.10 mg/dLCALCIUM 9.80 
mg/dLeGFR 50 Lithium 0.760 mmol/L

 

                          2013 11:02 AM        TRIGLYCERIDES 106.0 mg/dLCHOLESTEROL 181.0 mg/dLHDL 46.0 mg/dLLDL

 (CALC) 114.0 mg/dLVITAMIN D 41.10 ng/mL

 

                          10/17/2014 10:10 AM       WBC 5.0 RBC 3.66 HGB 11.60 g/dLHCT 36.80 %.0 fLMCH 31.70

 pgMCHC 31.50 g/dLRDW CV 13.50 %MPV 10.10 fLPLT 209 %NEUT 69.20 %%LYMP 21.0 
%%MONO 6.40 %%EOS 3.20 %%BASO 0.20 %#NEUT 3.46 #LYMP 1.05 #MONO 0.32 #EOS 0.16 
#BASO 0.01 GLUCOSE 100.0 mg/dLSODIUM 148.0 mmol/LPOTASSIUM 3.90 mmol/LCHLORIDE 
114.0 mmol/LCO2 26.0 mmol/LBUN 12.0 mg/dLCREATININE 1.20 mg/dLSGOT/AST 9.0 
IU/LSGPT/ALT <6 IU/LALK PHOS 82.0 IU/LTOTAL PROTEIN 6.90 g/dLALBUMIN 4.0 
g/dLTOTAL BILI 0.40 mg/dLCALCIUM 10.50 mg/dLeGFR 45 TRIGLYCERIDES 96.0 
mg/dLCHOLESTEROL 155.0 mg/dLHDL 38.0 mg/dLLDL (CALC) 98.0 mg/dLLithium 0.850 
mmol/LCancer Antigen (CA) 125 8.30 U/mL

 

                          2015 10:25 AM        Lithium 0.790 mmol/LWBC 4.8 RBC 3.44 HGB 11.0 g/dLHCT 35.20 

%.0 fLMCH 32.0 pgMCHC 31.30 g/dLRDW CV 14.0 %MPV 9.30 fLPLT 210 %NEUT 
70.80 %%LYMP 17.20 %%MONO 8.10 %%EOS 3.50 %%BASO 0.40 %#NEUT 3.41 #LYMP 0.83 
#MONO 0.39 #EOS 0.17 #BASO 0.02 TRIGLYCERIDES 107.0 mg/dLCHOLESTEROL 174.0 
mg/dLHDL 43.0 mg/dLLDL (CALC) 110.0 mg/dLGLUCOSE 90.0 mg/dLSODIUM 145.0 
mmol/LPOTASSIUM 3.80 mmol/LCHLORIDE 115.0 mmol/LCO2 24.0 mmol/LBUN 17.0 mg
/dLCREATININE 1.30 mg/dLSGOT/AST 18.0 IU/LSGPT/ALT 17.0 IU/LALK PHOS 56.0 
IU/LTOTAL PROTEIN 6.70 g/dLALBUMIN 3.90 g/dLTOTAL BILI 0.40 mg/dLCALCIUM 9.80 
mg/dLeGFR 41 

 

                          2015 8:50 AM        WBC 5.8 RBC 3.29 HGB 10.70 g/dLHCT 34.0 %.0 fLMCH 32.50

 pgMCHC 31.50 g/dLRDW CV 13.60 %MPV 9.60 fLPLT 223 %NEUT 69.60 %%LYMP 18.90 
%%MONO 8.50 %%EOS 2.80 %%BASO 0.20 %#NEUT 4.03 #LYMP 1.09 #MONO 0.49 #EOS 0.16 
#BASO 0.01 Lithium 0.620 mmol/LGLUCOSE 83.0 mg/dLSODIUM 139.0 mmol/LPOTASSIUM 
3.90 mmol/LCHLORIDE 109.0 mmol/LCO2 22.0 mmol/LBUN 19.0 mg/dLCREATININE 1.40 
mg/dLSGOT/AST 19.0 IU/LSGPT/ALT 21.0 IU/LALK PHOS 55.0 IU/LTOTAL PROTEIN 6.50 
g/dLALBUMIN 3.90 g/dLTOTAL BILI 0.50 mg/dLCALCIUM 9.60 mg/dLeGFR 38 
TRIGLYCERIDES 121.0 mg/dLCHOLESTEROL 192.0 mg/dLHDL 51.0 mg/dLLDL 121.0 mg/dLTSH
 1.210 uIU/mL







History Of Immunizations







       Name    Date Admin    Mfg Name    Mfg Code    Trade Name    Lot#    Route    Inj    Vis Given    Vis

 Pub                                    CVX

 

        Influenza    2008    Not Entered    NE      Not Entered            Not Entered    Not Entered

                    1            999

 

          Pneumococcal    2008    [employer]    MSD       Pneumovax 23              Intramuscular

                Not Entered     1        999

 

           Influenza    10/15/2010    [employer].    NOV        Fluvirin > 12 Years    

[id number]     Intramuscular    Left Deltoid    10/15/2010    2009    999

 

          Pneumococcal    3/23/2015    [employer]    [facility]       Prevnar 13    [id number]    Intramuscular

                Right Gluteous Medius    3/23/2015       2013       109







History of Past Illness







                    Name                Date of Onset       Comments

 

                    Peritoneal Neoplasm, Malignant                         

 

                    Hyperlipidemia                           

 

                    Bipolar disorder, unspecified                         

 

                    Artificial opening status; colostomy                         

 

                    B12 deficiency                           

 

                    Anemia, Pernicious                         

 

                    Arthritis unspecified                         

 

                    cervical cancer                          

 

                    Artificial opening status; colostomy    2010  1:10PM     

 

                    Bipolar disorder, unspecified    2010  1:10PM     

 

                    Hyperlipidemia      2010  1:10PM     

 

                    Anemia, Pernicious    2010  1:10PM     

 

                    Postoperative Follow-Up    2010  1:55PM     

 

                    Postoperative Follow-Up    Mar  8 2010 10:57AM     

 

                    Artificial opening status; colostomy    Mar  8 2010  1:19PM     

 

                    Peritoneal Neoplasm, Malignant    Mar  8 2010  1:19PM     

 

                    Artificial opening status; colostomy    2010  1:40PM     

 

                    Hyperlipidemia      2010  1:40PM     

 

                    Anemia, Pernicious    2010  1:40PM     

 

                    Peritoneal Neoplasm, Malignant    2010  1:40PM     

 

                    Arthritis unspecified    2010  1:40PM     

 

                    Anemia of Chronic Illness    2010  1:40PM     

 

                    Tinea corporis      2010  3:17PM     

 

                    Bipolar disorder, unspecified    2010  1:33PM     

 

                    Hyperlipidemia      2010  1:33PM     

 

                    Anemia, Pernicious    2010  1:33PM     

 

                    Peritoneal Neoplasm, Malignant    2010  1:33PM     

 

                    B12 deficiency      2010  1:33PM     

 

                    Ethmoidal Sinusitis, Acute    Sep 21 2010  3:53PM     

 

                    Wheezing            Sep 21 2010  3:53PM     

 

                    Flu                 Oct 15 2010  1:40PM     

 

                    Bipolar disorder, unspecified    Oct 15 2010  1:42PM     

 

                    Hyperlipidemia      Oct 15 2010  1:42PM     

 

                    Anemia, Pernicious    Oct 15 2010  1:42PM     

 

                    Peritoneal Neoplasm, Malignant    Oct 15 2010  1:42PM     

 

                    Bipolar disorder, unspecified    2011 12:01PM     

 

                    Hyperlipidemia      2011 12:01PM     

 

                    Anemia, Pernicious    2011 12:01PM     

 

                    Peritoneal Neoplasm, Malignant    2011 12:01PM     

 

                    Bipolar disorder, unspecified    Apr 15 2011 10:55AM     

 

                    Major Depression    2011 10:11AM     

 

                    Bipolar Disorder    2011 10:11AM     

 

                    Cancer              May 10 2011  4:16PM     

 

                    Major Depression    May 10 2011  3:16PM     

 

                    Bipolar Disorder    May 10 2011  3:16PM     

 

                    Hypercalcemia       May 23 2011  2:47PM     

 

                    Bipolar disorder, unspecified    May 23 2011  2:47PM     

 

                    Colon Cancer, Personal History    May 23 2011  2:47PM     

 

                    Bipolar Disorder    May 31 2011  4:39PM     

 

                    Depressive Disorder    2011 10:01AM     

 

                    Vitamin B12 deficiency    2011 10:01AM     

 

                    Vitamin D Deficiency    2011  5:07PM     

 

                    Anemia, Vitamin B12 Deficiency    2011  5:07PM     

 

                    B12 deficiency      2011  3:56PM     

 

                    Routine gynecological examination    Aug  4 2011  9:08AM     

 

                    Screening Examination for Breast Cancer    Aug  4 2011  9:08AM     

 

                    Tinea Corporis      Aug  4 2011  9:08AM     

 

                    Depressive Disorder    Sep 23 2011  8:47AM     

 

                    Contact Dermatitis    Sep 23 2011  8:47AM     

 

                    Anemia, Pernicious    Sep 23 2011  8:47AM     

 

                    B12 deficiency      Sep 23 2011  8:47AM     

 

                    B12 deficiency      Sep 27 2011  2:58PM     

 

                    B12 deficiency      Oct 20 2011  2:34PM     

 

                    Flu                 Dec  9 2011  3:16PM     

 

                    B12 deficiency      Dec  9 2011  3:17PM     

 

                    B12 deficiency      2012  4:52PM     

 

                    B12 deficiency      2012 11:10AM     

 

                    B12 deficiency      2012  3:37PM     

 

                    B12 deficiency      May  3 2012  4:10PM     

 

                    B12 deficiency      2012  2:54PM     

 

                    B12 deficiency      2012 11:23AM     

 

                    B12 deficiency      Aug  9 2012  2:08PM     

 

                    B12 deficiency      Sep  6 2012  4:36PM     

 

                    B12 deficiency      Oct 16 2012 10:23AM     

 

                    Flu                 Feb  2013  3:11PM     

 

                    Bipolar disorder, unspecified    Feb  2013  2:48PM     

 

                    Anemia, Pernicious    2013  2:48PM     

 

                    B12 deficiency      Feb  2013  2:48PM     

 

                    Extrapyramidal abnormal movement disorder    Feb  2013  2:48PM     

 

                    B12 deficiency      Apr  3 2013 12:03PM     

 

                    Bipolar disorder, unspecified    May  7 2013  1:31PM     

 

                    Anemia, Pernicious    May  7 2013  1:31PM     

 

                    B12 deficiency      May  7 2013  1:31PM     

 

                    Extrapyramidal abnormal movement disorder    May  7 2013  1:31PM     

 

                    B12 deficiency      2013  3:42PM     

 

                    B12 deficiency      2013  1:31PM     

 

                    Hyperlipidemia      Aug  7 2013 10:37AM     

 

                    Vitamin D Deficiency    Aug  7 2013 10:37AM     

 

                    Bipolar disorder, unspecified    Aug  7 2013 10:37AM     

 

                    Anemia, Pernicious    Aug  7 2013 10:37AM     

 

                    B12 deficiency      Aug  7 2013 10:37AM     

 

                    B12 deficiency      Sep 25 2013 11:15AM     

 

                    B12 deficiency      Dec 11 2013  3:16PM     

 

                    B12 deficiency      Mar  6 2014  1:48PM     

 

                    B12 deficiency      May 21 2014  3:17PM     

 

                    Screening Examination for Breast Cancer    2014  3:23PM     

 

                    Periumbilical abdominal pain    2014  3:23PM     

 

                    B12 deficiency      Jul 10 2014  2:52PM     

 

                    Anemia, Vitamin B12 Deficiency    Aug 13 2014  4:50PM     

 

                    Bipolar disorder    Oct 16 2014 11:13AM     

 

                    Hyperlipidemia      Oct 16 2014 11:13AM     

 

                    Anemia, Pernicious    Oct 16 2014 11:13AM     

 

                    Peritoneal Neoplasm, Malignant    Oct 16 2014 11:13AM     

 

                    Screening breast examination    Oct 16 2014 11:13AM     

 

                    Weight loss         Oct 16 2014 11:13AM     

 

                    Anemia, Pernicious    Mar 23 2015  2:57PM     

 

                    B12 deficiency      Mar 23 2015  2:57PM     

 

                    Need for Prevnar vaccine    Mar 23 2015  2:57PM     

 

                    Bipolar disorder    Mar 23 2015  2:57PM     

 

                    Hyperlipidemia      Mar 23 2015  2:57PM     

 

                    Anemia, Pernicious    Mar 23 2015  2:57PM     

 

                    Peritoneal Neoplasm, Malignant    Mar 23 2015  2:57PM     

 

                    B12 deficiency      May  4 2015  4:48PM     

 

                    Hyperlipidemia      May 13 2015  9:56AM     

 

                    Anemia              May 13 2015  9:56AM     

 

                    Bipolar disorder    May 13 2015  9:56AM     

 

                    Bipolar disorder    May 14 2015  3:27PM     

 

                    Hyperlipidemia      May 14 2015  3:27PM     

 

                    Anemia, Pernicious    May 14 2015  3:27PM     

 

                    Peritoneal Neoplasm, Malignant    May 14 2015  3:27PM     

 

                    B12 deficiency      2015  2:20PM     

 

                    B12 deficiency      2015 11:34AM     

 

                    B12 deficiency      Aug 18 2015  9:06AM     

 

                    Tinea Corporis      Sep 18 2015  8:54AM     

 

                    B12 deficiency      Sep 18 2015  8:54AM     

 

                    B12 deficiency      2015 10:28AM     

 

                    Herpes zoster without complication    Dec  3 2015  9:52AM     

 

                    B12 deficiency      Dec 23 2015 11:21AM     

 

                    B12 deficiency      2016  4:51PM     

 

                    Vitamin B 12 deficiency    Mar 14 2016  5:35PM     







Payers







           Insurance Name    Company Name    Plan Name    Plan Number    Policy Number    Policy Group

 Number                                 Start Date

 

                    Medicare Part A    Medicare Part A              [id number]              2006



 

                          [employer]    [employer]                 [id number]

                                                    

 

                    Medicare Part B    Medicare Of [place]              [id number]              2006

 

                          [employer]    [employer]                 50 percent

                                                    2009

 

                    [facility]    [employer]              [id number]              N/A

 

                    Medicare Part A    Medicare Part A              [id number]              N/A







History of Encounters







                    Visit Date          Visit Type          Provider

 

                    2016            Nurse visit         Bhupinder Louise DO

 

                    2015          Nurse visit         Bhupinder Louise DO

 

                    12/3/2015           Office visit        Bhupinder Louise DO

 

                    2015          Nurse visit         Bhupinder Louise DO

 

                    2015           Office visit        Bhupinder Louise DO

 

                    2015           Nurse visit         Bhupinder Louise DO

 

                    2015            Nurse visit         Bhupinder Louise DO

 

                    2015            Nurse visit         Bhupinder Louise DO

 

                    2015           Office visit        Bhupinder Louise DO

 

                    2015            Nurse visit         Bhupinder Louise DO

 

                    3/23/2015           Office visit        Bhupinder Louise DO

 

                    10/16/2014          Office visit        Bhupinder Louise DO

 

                    2014           Nurse visit         Radha GREEN

 

                    7/10/2014           Nurse visit         Bhupinder Louise DO

 

                    2014           Office visit        Bhupinder Louise DO

 

                    2014           Nurse visit         Bhupinder Aspen DO

 

                    3/6/2014            Nurse visit         Bhupinder Aspen DO

 

                    2014            Yasmine Lopez MD

 

                    2013          Nurse visit         Bhupinder Aspen DO

 

                    2013           Nurse visit         Bhupinder Aspen DO

 

                    2013            Office visit        Bhupinder Aspen DO

 

                    2013            Nurse visit         Bhupinder Aspen DO

 

                    2013            Nurse visit         Bhupinder Aspen DO

 

                    2013            Office visit        Bhupinder Aspen DO

 

                    4/3/2013            Nurse visit         Bhupinder Aspen DO

 

                    2013            Office visit        Bhupinder Aspen DO

 

                    10/16/2012          Nurse visit         Bhupinder Aspen DO

 

                    2012            Nurse visit         Bhupinder Aspen DO

 

                    2012            Voided              Bhupinder Aspen DO

 

                    2012            Nurse visit         Bhupinder Aspen DO

 

                    2012            Nurse visit         Bhupinder Aspen DO

 

                    2012           Nurse visit         Bhupinder Aspen DO

 

                    5/3/2012            Nurse visit         Bhupinder Aspen DO

 

                    2012           Nurse visit         Bhupinder Aspen DO

 

                    2012           Nurse visit         Bhupinder Aspen DO

 

                    2012           Nurse visit         Bhupinder Aspen DO

 

                    2011           Nurse visit         Bhupinder Aspen DO

 

                    10/20/2011          Nurse visit         Bhupinder Aspen DO

 

                    2011           Office visit        Bhupinder Aspen DO

 

                    2011           Nurse visit         Radha GREEN

 

                    2011            Office visit        Bhupinder Aspen DO

 

                    2011           Nurse visit         Bhupinder Aspen DO

 

                    2011            Office visit        Bhupinder Aspen DO

 

                    2011           Office visit        Bhupinder Aspen DO

 

                    5/10/2011           Office visit        Bhupinder Aspen DO

 

                    2011           Office visit        Bhupinder Aspen DO

 

                    4/15/2011           Office visit        Devin Angel DO

 

                    2011           Office visit        Devin Angel DO

 

                    10/15/2010          Office visit        Devin Angel DO

 

                    2010           Office visit        Devin Angel DO

 

                    2010            Office visit        Devin Angel DO

 

                    2010           Office visit        Devin Angel DO

 

                    2010            Office visit        Devin Angel DO

 

                    3/8/2010            Office visit        Devin Masterson MD

 

                    2010            Surgery             Devin Masterson MD

 

                    2010            Office visit        Devin Angel DO

 

                    2010           Surgery             Devin Masterson MD

 

                    2010           Hospital            Devin Masterson MD

 

                    2010           Hospital            Devin Masterson MD

 

                    10/22/2009          Office visit        Devin Angel DO

## 2019-06-26 NOTE — XMS REPORT
MU2 Ambulatory Summary

                             Created on: 2015



Pauline Gan

External Reference #: [id number]

: 1950

Sex: Female



Demographics







                          Address                   [address]

[place]  [address]

 

                          Home Phone                [phone]

 

                          Preferred Language        English

 

                          Marital Status            Legally 

 

                          Denominational Affiliation     Unknown

 

                          Race                      White

 

                          Ethnic Group              Not  or 





Author







                          Bhupinder Aguilar

 

                          [facility]

 

                          Address                   [address]

[place]  [id number]



 

                          Phone                     [phone]







Care Team Providers







                    Care Team Member Name    Role                Phone

 

                    Bhupinder Louise    PCP                 Unavailable







Allergies and Adverse Reactions







                    Name                Reaction            Notes

 

                    NO KNOWN DRUG ALLERGIES                         







Plan of Treatment







             Planned Activity    Comments     Planned Date    Planned Time    Plan/Goal

 

             COMPLETE CBC W/AUTO DIFF WBC                 2013     12:00 AM      

 

             ASSAY OF LITHIUM                 2013     12:00 AM      

 

             METABOLIC PANEL TOTAL CA                 2013     12:00 AM      

 

             VITAMIN B-12                 2013     12:00 AM      

 

             ASSAY OF FOLIC ACID SERUM                 2013     12:00 AM      

 

             THER/PROPH/DIAG INJ SC/IM                 2013    12:00 AM      







Medications







                                        Active 

 

             Name         Start Date    Estimated Completion Date    SIG          Comments

 

                syringe with needle (disp) 1 mL 25 X 1" miscellaneous syringe    2011                        use 

for injection once a month               

 

                Latuda 20 mg oral tablet                                    take 1 tablet (20 mg) by oral route once daily with

 food (at least 350 calories)            

 

             pravastatin 40 mg oral tablet    3/30/2015                 TAKE 1 TABLET BY MOUTH DAILY     

 

                Namenda XR 28 mg oral capsule,sprinkle,ER 24hr    2015                       take 1 capsule (28

 mg) by oral route once daily            

 

                prednisone 10 mg oral tablet    12/3/2015                       take 2 tablets (20 mg) by oral route

 once daily for 4 days 1 tablet daily for 4 days 0.5 tablet daily for 4 days     









                                         

 

             Name         Start Date    Expiration Date    SIG          Comments

 

             Reglan 10mg    3/29/2010    2010    one ac and hs     

 

                Keflex 500 mg oral capsule    2010       10/1/2010       take 1 capsule (500 mg) by oral

 route every 6 hours for 10 days         

 

                Bactrim -160 mg oral tablet    2011       take 1 tablet by oral route

 every 12 hours for 7 days               

 

                triamcinolone acetonide 0.1 % topical cream    2011      apply a thin

 layer to the affected area(s) by topical route 2 times per day     

 

                sertraline 100 mg oral tablet    4/10/2012       5/10/2012       take 1.5 tablets by oral route

 daily for 30 days                       

 

                ergocalciferol (vitamin D2) 50,000 unit oral capsule    4/15/2013       2013       TAKE

 ONE CAPSULE BY MOUTH ONCE A WEEK        

 

                CYANOCOBALAM 1000MCGINJ 1000 milliliter    2013       INJECT 1ML INTRAMUSCULAR

 ONCE A MONTH                            

 

                pravastatin 40 mg oral tablet    3/25/2014       3/20/2015       TAKE ONE TABLET BY MOUTH EVERY

 DAY                                     

 

                          Zostavax (PF) 19,400 unit/0.65 mL subcutaneous suspension for reconstitution    3/23/2015

                    3/24/2015           inject 0.65 milliliter by subcutaneous route once     

 

                lithium carbonate 300 mg oral capsule    2015       take 1 capsule (300

 mg) by oral route 2 for 30 days         

 

                famciclovir 500 mg oral tablet    12/3/2015       12/10/2015      take 1 tablet (500 mg) by

 oral route every 8 hours for 7 days     









                                        Discontinued 

 

             Name         Start Date    Discontinued Date    SIG          Comments

 

                Tylenol 325 mg oral tablet                    2013        take 1 - 2 tablets (325 -650 mg) by oral

 route every 4-6 hours as needed         

 

                Calcium 600 + D(3) 600 mg(1,500mg) -400 unit oral tablet                    2011       take 1 tablet

 by oral route 2 times a day            no longer taking

 

                Vitamin B-12 1,000 mcg oral tablet extended release    2010       take 1

 tablet by oral route daily             no longer taking

 

                Antifungal (clotrimazole) 1 % topical cream    2010       apply to the 

affected and surrounding areas of skin by topical route 2 times per day morning 
and evening                              

 

                sertraline 100 mg oral tablet    5/10/2011       2011       take 2 tablets (200 mg) by 

oral route once daily                   discontinued by Dr. Serrano

 

                mirtazapine 15 mg oral tablet                    2011        take 1 tablet (15 mg) by oral route 

once daily before bedtime               Dr. Serrano

 

                mirtazapine 15 mg oral tablet                    2011        take 1 tablet (15 mg) by oral route 

once daily before bedtime               dc'd by Dr. Serrano

 

                Pristiq 50 mg oral tablet extended release 24 hr                    2013        take 1 tablet (50

 mg) by oral route once daily           Dr. Serrano

 

                Pristiq 50 mg oral tablet extended release 24 hr                    2013        take 1 tablet (50

 mg) by oral route once daily           dose updated

 

                Vitamin B-12 1,000 mcg/mL injection solution    2011        inject 1 milliliter

 (1,000 mcg) by intramuscular route once a month    on list already

 

                clotrimazole 1 % topical cream    2011        apply to the affected and surrounding

 areas of skin by topical route 2 times per day in the morning and evening     

 

                Vitamin D2 50,000 unit oral capsule    2011        take 1 capsule (50,000

 unit) by oral route once weekly        generic on list

 

                Pravachol 40 mg oral tablet    2012        take 1 tablet (40 mg) by oral 

route once daily for 90 days            generic on list

 

                lithium carbonate 300 mg oral capsule    2012        take 1 capsule by oral

 route daily                            dose updated

 

                Pristiq 100 mg oral tablet extended release 24 hr                    4/10/2012       take 1 and 1/2 

tablet (150 mg) by oral route once daily    Mental Health provider

 

                Pristiq 100 mg oral tablet extended release 24 hr                    4/10/2012       take 1 and 1/2 

tablet (150 mg) by oral route once daily    Discontinued by Dr Efrain Knight at [facility]

 

                hydroxyzine HCl 50 mg oral tablet    10/16/2014      2015       take 1 tablet (50 mg) 

by oral route at bedtime                 

 

                fluconazole 100 mg oral tablet    2015       12/3/2015       take 1 tablet (100 mg) by 

oral route once a week                   

 

                ketoconazole 2 % topical cream    2015       12/3/2015       apply to the affected area(s)

 by topical route 2 times per day        







Problem List







                    Description         Status              Onset

 

                    Artificial opening status; colostomy    Active               

 

                    Bipolar disorder, unspecified    Active               

 

                    Hyperlipidemia      Active               

 

                    Peritoneal Neoplasm, Malignant    Active               

 

                    Anemia, Pernicious    Active               

 

                    Arthritis unspecified    Active               

 

                    B12 deficiency      Active               







Vital Signs







      Date    Time    BP-Sys(mm[Hg]    BP-Lynn(mm[Hg])    HR(bpm)    RR(rpm)    Temp    WT    HT    HC    BMI

                    BSA                 BMI Percentile      O2 Sat(%)

 

        12/3/2015    9:50:00 AM    132 mmHg    70 mmHg    62 bpm    16 rpm    97.9 F    145 lbs    69 in

                          21.41 kg/m2    1.79 m2                   100 %

 

        2015    8:52:00 AM    132 mmHg    68 mmHg    52 bpm    20 rpm    97.8 F    141 lbs    69 in

                          20.8218 kg/m    1.7645 m                 100 %

 

        2015    3:25:00 PM    120 mmHg    62 mmHg    72 bpm    16 rpm    98.1 F    136 lbs    69 in

                          20.08 kg/m2    1.73 m2                   98 %

 

       3/23/2015    2:55:00 PM    130 mmHg    76 mmHg    68 bpm    18 rpm    97 F    140 lbs    69 in    

                20.6742 kg/m    1.7583 m                      98 %

 

        10/16/2014    11:11:00 AM    120 mmHg    66 mmHg    77 bpm    20 rpm    98 F    130 lbs    69 in

                          19.20 kg/m2    1.69 m2                   100 %

 

        2014    3:21:00 PM    130 mmHg    66 mmHg    63 bpm    18 rpm    97.2 F    160 lbs    69 in

                          23.6276 kg/m    1.8797 m                 99 %

 

        2013    10:35:00 AM    132 mmHg    70 mmHg    66 bpm    20 rpm    98.1 F    157 lbs    69 in

                          23.18 kg/m2    1.86 m2                    

 

        2013    1:29:00 PM    132 mmHg    70 mmHg    76 bpm    18 rpm    98.2 F    166 lbs    69 in 

                          24.5137 kg/m    1.9146 m                  

 

       2013    2:46:00 PM    128 mmHg    70 mmHg    76 bpm    16 rpm    98 F    160 lbs    69 in     

                23.63 kg/m2     1.88 m2                          

 

        2011    8:49:00 AM    128 mmHg    78 mmHg    70 bpm    18 rpm    97.9 F    164 lbs    69 in

                          24.2183 kg/m    1.903 m                  

 

     2011    1:31:00 PM    132 mmHg    68 mmHg    84 bpm         97 F    167 lbs                        

                                         

 

        2011    9:09:00 AM    128 mmHg    70 mmHg    72 bpm    18 rpm    98.2 F    163 lbs    64 in 

                          27.9786 kg/m    1.8272 m                  

 

       2011    10:01:00 AM    132 mmHg    70 mmHg    72 bpm    18 rpm    98.2 F    154 lbs             

                                                                 

 

       2011    2:47:00 PM    128 mmHg    70 mmHg    72 bpm    18 rpm    97.8 F    156 lbs             

                                                                 

 

       5/10/2011    3:16:00 PM    144 mmHg    80 mmHg    72 bpm    18 rpm    98.2 F    158 lbs             

                                                                 

 

        2011    10:11:00 AM    132 mmHg    70 mmHg    70 bpm    18 rpm    98.2 F    168 lbs    69 in

                          24.809 kg/m    1.9261 m                  

 

        4/15/2011    10:52:00 AM    110 mmHg    60 mmHg    75 bpm    16 rpm    97.5 F    172.375 lbs    

69 in                     25.46 kg/m2    1.95 m2                   100 %

 

        2011    11:43:00 AM    120 mmHg    82 mmHg    75 bpm    16 rpm    97.2 F    178.5 lbs    69

 in                       26.3596 kg/m    1.9854 m                 100 %

 

        10/15/2010    1:32:00 PM    120 mmHg    70 mmHg    80 bpm    18 rpm    96.6 F    177 lbs    69 in

                          26.14 kg/m2    1.98 m2                   100 %

 

        2010    3:50:00 PM    168 mmHg    100 mmHg    82 bpm    18 rpm    97.8 F    177.5 lbs    69

 in                       26.2119 kg/m    1.9798 m                 97 %

 

        2010    1:21:00 PM    140 mmHg    80 mmHg    59 bpm    16 rpm    97.6 F    173.25 lbs    69 

in                        25.58 kg/m2    1.96 m2                   100 %

 

        2010    3:02:00 PM    140 mmHg    80 mmHg    61 bpm    16 rpm    97.6 F    173.125 lbs    69

 in                       25.5658 kg/m    1.9553 m                 99 %

 

        2010    1:23:00 PM    130 mmHg    80 mmHg    66 bpm    16 rpm    96.8 F    173 lbs    69 in 

                          25.55 kg/m2    1.95 m2                   100 %

 

        2010    12:58:00 PM    130 mmHg    88 mmHg    75 bpm    16 rpm    98.4 F    172.25 lbs    69

 in                       25.4366 kg/m    1.9503 m                 100 %







Social History







                    Name                Description         Comments

 

                    denies alcohol use                         

 

                    denies smoking                           

 

                    Denies illicit substance abuse                         

 

                    retired                                 direct care

 

                    Single                                   

 

                    Exercises regularly                         

 

                    Attended some college                         

 

                    Tobacco             Never smoker         







History of Procedures







                    Date Ordered        Description         Order Status

 

                    2010 12:00 AM    COMPREHEN METABOLIC PANEL    Reviewed

 

                    2010 12:00 AM    COMPLETE CBC W/AUTO DIFF WBC    Reviewed

 

                    2010 12:00 AM    LIPID PANEL         Reviewed

 

                          2015 12:00 AM        B12 Injection, Up to 1000 Mcg NDC#7236-7204-98 [facility] 

                                        Reviewed

 

                    2011 12:00 AM    MAMMOGRAM SCREENING    Reviewed

 

                    2011 12:00 AM    CYTOPATH C/V THIN LAYER    Reviewed

 

                    2011 12:00 AM    B12 Injection 1 cc NDC#[address]-76    Reviewed

 

                    2015 12:00 AM    THER/PROPH/DIAG INJ SC/IM    Reviewed

 

                    2015 12:00 AM    B12 Injection, Up to 1000 Mcg NDC#5341-5359-76    Reviewed

 

                    2011 12:00 AM    THER/PROPH/DIAG INJ SC/IM    Reviewed

 

                    2011 12:00 AM    B12 Injection([facility]) Ndc#8183-2138-13-    Reviewed

 

                    2015 12:00 AM    THER/PROPH/DIAG INJ SC/IM    Reviewed

 

                    2015 12:00 AM    B12 Injection, Up to 1000 Mcg NDC#8936-9840-81    Reviewed

 

                    10/20/2011 12:00 AM    THER/PROPH/DIAG INJ SC/IM    Reviewed

 

                    10/20/2011 12:00 AM    B12 Injection([facility]) Ndc#7468-0263-57-    Reviewed

 

                    2011 12:00 AM    ***Immunization administration, Medicare flu    Reviewed

 

                    2011 12:00 AM    Fluzone ** MEDICARE Only **    Reviewed

 

                    2011 12:00 AM    THER/PROPH/DIAG INJ SC/IM    Reviewed

 

                    2011 12:00 AM    B12 Injection ([facility]) Ndc#7283-8521-33    Reviewed

 

                    2012 12:00 AM    THER/PROPH/DIAG INJ SC/IM    Reviewed

 

                    2012 12:00 AM    B12 Injection ([facility]) Ndc#5569-7686-44    Reviewed

 

                    2012 12:00 AM    THER/PROPH/DIAG INJ SC/IM    Reviewed

 

                    2012 12:00 AM    B12 Injection([facility]) Ndc#3311-5572-39-    Reviewed

 

                    5/3/2012 12:00 AM    THER/PROPH/DIAG INJ SC/IM    Reviewed

 

                    5/3/2012 12:00 AM    B12 Injection([facility]) Ndc#4945-2840-50-    Reviewed

 

                    2012 12:00 AM    IMMUNOTHERAPY INJECTIONS    Reviewed

 

                    2012 12:00 AM    B12 Injection([place]) Ndc#6168-1535-75-    Reviewed

 

                    2012 12:00 AM    THER/PROPH/DIAG INJ SC/IM    Reviewed

 

                    2012 12:00 AM    B12 Injection, Up to 1000 Mcg NDC#4049-3754-46    Reviewed

 

                    2012 12:00 AM    THER/PROPH/DIAG INJ SC/IM    Reviewed

 

                    2012 12:00 AM    B12 Injection, Up to 1000 Mcg NDC#5995-8800-60    Reviewed

 

                    2012 12:00 AM    THER/PROPH/DIAG INJ SC/IM    Reviewed

 

                    2012 12:00 AM    B12 Injection, Up to 1000 Mcg NDC#4241-7023-32    Reviewed

 

                    10/16/2012 12:00 AM    THER/PROPH/DIAG INJ SC/IM    Reviewed

 

                    10/16/2012 12:00 AM    B12 Injection, Up to 1000 Mcg NDC#1680-2922-30    Reviewed

 

                    2010 12:00 AM    COMPREHEN METABOLIC PANEL    Reviewed

 

                    2010 12:00 AM    COMPLETE CBC W/AUTO DIFF WBC    Reviewed

 

                    2010 12:00 AM    LIPID PANEL         Reviewed

 

                    2013 12:00 AM    Flu Injection 3 Years And Above NDC# [address]-37  [facility]    Reviewed



 

                    2013 12:00 AM    COMPLETE CBC W/AUTO DIFF WBC    Reviewed

 

                    2013 12:00 AM    ASSAY OF LITHIUM    Reviewed

 

                    2013 12:00 AM    METABOLIC PANEL TOTAL CA    Reviewed

 

                    4/3/2013 12:00 AM    THER/PROPH/DIAG INJ SC/IM    Reviewed

 

                    4/3/2013 12:00 AM    B12 Injection, Up to 1000 Mcg NDC#4253-0490-89    Reviewed

 

                    2013 12:00 AM    THER/PROPH/DIAG INJ SC/IM    Reviewed

 

                    2013 12:00 AM    B12 Injection, Up to 1000 Mcg NDC#0081-8218-93    Reviewed

 

                    2013 12:00 AM    THER/PROPH/DIAG INJ SC/IM    Reviewed

 

                    2013 12:00 AM    B12 Injection, Up to 1000 Mcg NDC#2128-3762-75    Reviewed

 

                    2013 12:00 AM    LIPID PANEL         Reviewed

 

                    2013 12:00 AM    VITAMIN D 25 HYDROXY    Reviewed

 

                    2013 12:00 AM    THER/PROPH/DIAG INJ SC/IM    Reviewed

 

                    3/6/2014 12:00 AM    THER/PROPH/DIAG INJ SC/IM    Reviewed

 

                    2014 12:00 AM    THER/PROPH/DIAG INJ SC/IM    Reviewed

 

                    2014 12:00 AM    B12 Injection, Up to 1000 Mcg NDC#1923-6184-80    Reviewed

 

                    2010 12:00 AM    SKIN FUNGI CULTURE    Reviewed

 

                    10/9/2010 12:00 AM    COMPREHEN METABOLIC PANEL    Reviewed

 

                    10/9/2010 12:00 AM    LIPID PANEL         Reviewed

 

                    2010 12:00 AM    THER/PROPH/DIAG INJ SC/IM    Reviewed

 

                    2010 12:00 AM    B12 Injection Ndc#[address]-22 ([facility])    Reviewed

 

                    2010 12:00 AM    THER/PROPH/DIAG INJ SC/IM    Reviewed

 

                    2010 12:00 AM    Kenalog 40 Mg Im-Ndc#[address]-06 ([facility])    Reviewed

 

                    10/15/2010 12:00 AM    FLU VACCINE 3 YRS & > IM    Reviewed

 

                    10/15/2010 12:00 AM    Admin.Of M/C Cov.Vaccine-Flu Vacc.    Reviewed

 

                    1/15/2011 12:00 AM    COMPLETE CBC W/AUTO DIFF WBC    Reviewed

 

                    1/15/2011 12:00 AM    COMPREHEN METABOLIC PANEL    Reviewed

 

                    1/15/2011 12:00 AM    LIPID PANEL         Reviewed

 

                    2014 12:00 AM    MAMMOGRAM SCREENING    Reviewed

 

                    2014 12:00 AM    Screening mammography, bilateral    Reviewed

 

                    7/10/2014 12:00 AM    THER/PROPH/DIAG INJ SC/IM    Reviewed

 

                    7/10/2014 12:00 AM    B12 Injection, Up to 1000 Mcg NDC#9573-2838-21    Reviewed

 

                    2011 12:00 AM    COMPLETE CBC W/AUTO DIFF WBC    Reviewed

 

                    2011 12:00 AM    COMPREHEN METABOLIC PANEL    Reviewed

 

                    2011 12:00 AM    LIPID PANEL         Reviewed

 

                    2014 12:00 AM    B12 Injection, Up to 1000 Mcg NDC#9966-6542-75    Reviewed

 

                    10/19/2014 12:00 AM    MAMMOGRAM SCREENING    Reviewed

 

                    10/19/2014 12:00 AM    Screening mammography, bilateral    Reviewed

 

                    10/16/2014 12:00 AM    COMPLETE CBC W/AUTO DIFF WBC    Reviewed

 

                    10/16/2014 12:00 AM    COMPREHEN METABOLIC PANEL    Reviewed

 

                    10/16/2014 12:00 AM    IMMUNOASSAY TUMOR     Reviewed

 

                    10/16/2014 12:00 AM    LIPID PANEL         Reviewed

 

                    10/16/2014 12:00 AM    ASSAY OF LITHIUM    Reviewed

 

                    10/16/2014 12:00 AM    MAMMOGRAM SCREENING    Reviewed

 

                    2011 12:00 AM    ASSAY OF PARATHORMONE    Reviewed

 

                    2011 12:00 AM    VITAMIN D 25 HYDROXY    Reviewed

 

                    2011 12:00 AM    ASSAY OF LITHIUM    Reviewed

 

                    2011 12:00 AM    METABOLIC PANEL TOTAL CA    Reviewed

 

                    2011 12:00 AM    CT HEAD/BRAIN W/O & W/DYE    Reviewed

 

                    3/23/2015 12:00 AM    PNEUMOCOCCAL VACC 13 GLENDY IM    Reviewed

 

                    3/23/2015 12:00 AM    Vitamin B12 injection    Reviewed

 

                    2011 12:00 AM    ASSAY OF LITHIUM    Reviewed

 

                    2011 12:00 AM    B12 Injection Ndc#[address]-58  [facility]    Reviewed

 

                    2015 12:00 AM    THER/PROPH/DIAG INJ SC/IM    Reviewed

 

                    2015 12:00 AM    B12 Injection, Up to 1000 Mcg NDC#7918-9738-00    Reviewed

 

                    2015 12:00 AM    COMPLETE CBC W/AUTO DIFF WBC    Reviewed

 

                    2015 12:00 AM    COMPREHEN METABOLIC PANEL    Reviewed

 

                    2015 12:00 AM    LIPID PANEL         Reviewed

 

                    2015 12:00 AM    ASSAY OF LITHIUM    Reviewed

 

                    2011 12:00 AM    VIT D 1 25-DIHYDROXY    Reviewed

 

                    2011 12:00 AM    VITAMIN B-12        Reviewed

 

                    2015 12:00 AM    B12 Injection, Up to 1000 Mcg NDC#5750-8309-71    Reviewed

 

                    2015 12:00 AM    THER/PROPH/DIAG INJ SC/IM    Reviewed

 

                    2015 12:00 AM    B12 Injection, Up to 1000 Mcg NDC#8066-5101-25    Reviewed

 

                    2011 12:00 AM    THER/PROPH/DIAG INJ SC/IM    Reviewed

 

                    2011 12:00 AM    B12 Injection ([employer] [facility]) Ndc#6014-5475-84    Reviewed

 

                    2015 12:00 AM    THER/PROPH/DIAG INJ SC/IM    Reviewed

 

                    2015 12:00 AM    B12 Injection, Up to 1000 Mcg NDC#8029-7086-62    Reviewed







Results Summary







                          Data and Description      Results

 

                          2004 12:00 AM        Colonoscopy-Women and Men over 50 Normal 

 

                          2008 12:00 AM         Pap Smear Declined 

 

                          10/7/2009 12:00 AM        Cholest Cry Stone Ql .0 %LDLc SerPl-mCnc 123.0 mg/dLHDLc

 SerPl-mCnc 34.0 mg/dLTrigl SerPl-mCnc 190.0 mg/dLGlucose SerPl-mCnc 78.0 mg/dL

 

                          2009 12:00 AM        Mammogram -Women over 40 Normal HIV1+2 Ab Ser Ql no risk 

 

                          2010 8:47 AM         Dexa Bone Scan Refused Aspirin reccommended Contraindication 



 

                          2010 8:48 AM         Depression Done 

 

                          2010 12:00 AM         Foot Exam-Diabetic Done 

 

                          2010 12:00 AM         Cholest Cry Stone Ql .0 %LDLc SerPl-mCnc 126.0 mg/dLGlucose

 SerPl-mCnc 102.0 mg/dL

 

                          2010 8:45 AM          TRIGLYCERIDES 122.0 mg/dLCHOLESTEROL 186.0 mg/dLHDL 36.0 mg/dLLDL

 (CALC) 126.0 mg/dLGLUCOSE 102.0 mg/dLSODIUM 143.0 mmol/LPOTASSIUM 3.70 
mmol/LCHLORIDE 111.0 mmol/LCO2 23.0 mmol/LBUN 10.0 mg/dLCREATININE 0.80 
mg/dLSGOT/AST 12.0 IU/LSGPT/ALT 11.0 IU/LALK PHOS 65.0 IU/LTOTAL PROTEIN 7.20 
g/dLALBUMIN 3.90 g/dLTOTAL BILI 0.50 mg/dLCALCIUM 10.20 mg/dLeGFR >60 
mL/min/1.73 m2WBC 5.7 RBC 3.26 HGB 10.60 g/dLHCT 31.70 %MCV 97.0 fLMCH 32.50 
pgMCHC 33.40 g/dLRDW CV 13.30 %MPV 9.70 fLPLT 287 %NEUT 62.90 %%LYMP 21.80 
%%MONO 9.90 %%EOS 5.0 %%BASO 0.40 %#NEUT 3.56 #LYMP 1.23 #MONO 0.56 #EOS 0.28 
#BASO 0.02 

 

                          2010 12:00 AM        Glucose SerPl-mCnc 96.0 mg/dLCholest Cry Stone Ql .0 %LDLc

 SerPl-mCnc 146.0 mg/dL

 

                          2010 8:26 AM         TRIGLYCERIDES 106.0 mg/dLCHOLESTEROL 199.0 mg/dLHDL 32.0 mg/dLLDL

 (CALC) 146.0 mg/dLGLUCOSE 96.0 mg/dLSODIUM 143.0 mmol/LPOTASSIUM 4.0 
mmol/LCHLORIDE 113.0 mmol/LCO2 24.0 mmol/LBUN 13.0 mg/dLCREATININE 1.0 
mg/dLSGOT/AST 11.0 IU/LSGPT/ALT 6.0 IU/LALK PHOS 56.0 IU/LTOTAL PROTEIN 6.60 
g/dLALBUMIN 3.80 g/dLTOTAL BILI 0.50 mg/dLCALCIUM 9.30 mg/dLeGFR 57 

 

                          10/6/2010 12:00 AM        Cholest Cry Stone Ql .0 %LDLc SerPl-mCnc 111.0 mg/dLGlucose

 SerPl-mCnc 81.0 mg/dL

 

                          10/6/2010 2:45 PM         TRIGLYCERIDES 123.0 mg/dLCHOLESTEROL 178.0 mg/dLHDL 42.0 mg/dLLDL

 (CALC) 111.0 mg/dLGLUCOSE 81.0 mg/dLSODIUM 139.0 mmol/LPOTASSIUM 4.10 
mmol/LCHLORIDE 106.0 mmol/LCO2 24.0 mmol/LBUN 13.0 mg/dLCREATININE 0.90 
mg/dLSGOT/AST 13.0 IU/LSGPT/ALT 11.0 IU/LALK PHOS 61.0 IU/LTOTAL PROTEIN 7.10 
g/dLALBUMIN 3.90 g/dLTOTAL BILI 0.30 mg/dLCALCIUM 9.30 mg/dLeGFR >60 mL/min/1.73
 m2WBC 6.9 RBC 3.59 HGB 11.50 g/dLHCT 35.30 %MCV 98.0 fLMCH 32.0 pgMCHC 32.60 
g/dLRDW CV 12.90 %MPV 9.90 fLPLT 311 %NEUT 64.90 %%LYMP 22.50 %%MONO 7.20 %%EOS 
5.10 %%BASO 0.30 %#NEUT 4.45 #LYMP 1.54 #MONO 0.49 #EOS 0.35 #BASO 0.02 

 

                          2011 12:00 AM         Mammogram -Women over 40 Ordered 

 

                          2011 10:25 AM        TRIGLYCERIDES 111.0 mg/dLCHOLESTEROL 195.0 mg/dLHDL 43.0 mg/dLLDL

 (CALC) 130.0 mg/dLWBC 5.3 RBC 3.76 HGB 12.0 g/dLHCT 37.80 %.0 fLMCH 
31.90 pgMCHC 31.70 g/dLRDW CV 13.0 %MPV 9.70 fLPLT 259 %NEUT 69.0 %%LYMP 17.60 
%%MONO 8.30 %%EOS 4.70 %%BASO 0.40 %#NEUT 3.63 #LYMP 0.93 #MONO 0.44 #EOS 0.25 
#BASO 0.02 GLUCOSE 102.0 mg/dLSODIUM 146.0 mmol/LPOTASSIUM 4.20 mmol/LCHLORIDE 
113.0 mmol/LCO2 23.0 mmol/LBUN 15.0 mg/dLCREATININE 1.0 mg/dLSGOT/AST 12.0 
IU/LSGPT/ALT 17.0 IU/LALK PHOS 60.0 IU/LTOTAL PROTEIN 6.90 g/dLALBUMIN 4.20 
g/dLTOTAL BILI 0.40 mg/dLCALCIUM 9.70 mg/dLeGFR 57 

 

                          2011 11:49 AM        Cholest Cry Stone Ql .0 %LDLc SerPl-mCnc 130.0 mg/dLHDLc

 SerPl-mCnc 43.0 mg/dLTrigl SerPl-mCnc 111.0 mg/dLGlucose SerPl-mCnc 102.0 mg/dL

 

                          2011 11:52 AM        Pap Smear Declined 

 

                          2011 11:28 AM        Lithium 2.080 mmol/LGLUCOSE 102.0 mg/dLSODIUM 135.0 mmol/LPOTASSIUM

 3.90 mmol/LCHLORIDE 106.0 mmol/LCO2 21.0 mmol/LBUN 12.0 mg/dLCREATININE 1.30 
mg/dLCALCIUM 10.70 mg/dLeGFR 42 

 

                          2011 8:58 AM          Lithium 0.690 mmol/L

 

                          2011 2:38 PM         VITAMIN B12 3483.0 pg/mL

 

                          2013 3:35 PM          WBC 5.1 RBC 3.73 HGB 11.70 g/dLHCT 36.40 %MCV 98.0 fLMCH 31.40

 pgMCHC 32.10 g/dLRDW CV 13.10 %MPV 9.80 fLPLT 224 %NEUT 66.80 %%LYMP 19.10 
%%MONO 9.0 %%EOS 4.90 %%BASO 0.20 %#NEUT 3.42 #LYMP 0.98 #MONO 0.46 #EOS 0.25 
#BASO 0.01 GLUCOSE 88.0 mg/dLSODIUM 141.0 mmol/LPOTASSIUM 4.10 mmol/LCHLORIDE 
110.0 mmol/LCO2 22.0 mmol/LBUN 22.0 mg/dLCREATININE 1.10 mg/dLCALCIUM 9.80 
mg/dLeGFR 50 Lithium 0.760 mmol/L

 

                          2013 11:02 AM        TRIGLYCERIDES 106.0 mg/dLCHOLESTEROL 181.0 mg/dLHDL 46.0 mg/dLLDL

 (CALC) 114.0 mg/dLVITAMIN D 41.10 ng/mL

 

                          10/17/2014 10:10 AM       WBC 5.0 RBC 3.66 HGB 11.60 g/dLHCT 36.80 %.0 fLMCH 31.70

 pgMCHC 31.50 g/dLRDW CV 13.50 %MPV 10.10 fLPLT 209 %NEUT 69.20 %%LYMP 21.0 
%%MONO 6.40 %%EOS 3.20 %%BASO 0.20 %#NEUT 3.46 #LYMP 1.05 #MONO 0.32 #EOS 0.16 
#BASO 0.01 GLUCOSE 100.0 mg/dLSODIUM 148.0 mmol/LPOTASSIUM 3.90 mmol/LCHLORIDE 
114.0 mmol/LCO2 26.0 mmol/LBUN 12.0 mg/dLCREATININE 1.20 mg/dLSGOT/AST 9.0 
IU/LSGPT/ALT <6 IU/LALK PHOS 82.0 IU/LTOTAL PROTEIN 6.90 g/dLALBUMIN 4.0 
g/dLTOTAL BILI 0.40 mg/dLCALCIUM 10.50 mg/dLeGFR 45 TRIGLYCERIDES 96.0 
mg/dLCHOLESTEROL 155.0 mg/dLHDL 38.0 mg/dLLDL (CALC) 98.0 mg/dLLithium 0.850 
mmol/LCancer Antigen (CA) 125 8.30 U/mL

 

                          2015 10:25 AM        Lithium 0.790 mmol/LWBC 4.8 RBC 3.44 HGB 11.0 g/dLHCT 35.20 

%.0 fLMCH 32.0 pgMCHC 31.30 g/dLRDW CV 14.0 %MPV 9.30 fLPLT 210 %NEUT 
70.80 %%LYMP 17.20 %%MONO 8.10 %%EOS 3.50 %%BASO 0.40 %#NEUT 3.41 #LYMP 0.83 
#MONO 0.39 #EOS 0.17 #BASO 0.02 TRIGLYCERIDES 107.0 mg/dLCHOLESTEROL 174.0 
mg/dLHDL 43.0 mg/dLLDL (CALC) 110.0 mg/dLGLUCOSE 90.0 mg/dLSODIUM 145.0 
mmol/LPOTASSIUM 3.80 mmol/LCHLORIDE 115.0 mmol/LCO2 24.0 mmol/LBUN 17.0 mg
/dLCREATININE 1.30 mg/dLSGOT/AST 18.0 IU/LSGPT/ALT 17.0 IU/LALK PHOS 56.0 
IU/LTOTAL PROTEIN 6.70 g/dLALBUMIN 3.90 g/dLTOTAL BILI 0.40 mg/dLCALCIUM 9.80 
mg/dLeGFR 41 

 

                          2015 8:50 AM        WBC 5.8 RBC 3.29 HGB 10.70 g/dLHCT 34.0 %.0 fLMCH 32.50

 pgMCHC 31.50 g/dLRDW CV 13.60 %MPV 9.60 fLPLT 223 %NEUT 69.60 %%LYMP 18.90 
%%MONO 8.50 %%EOS 2.80 %%BASO 0.20 %#NEUT 4.03 #LYMP 1.09 #MONO 0.49 #EOS 0.16 
#BASO 0.01 Lithium 0.620 mmol/LGLUCOSE 83.0 mg/dLSODIUM 139.0 mmol/LPOTASSIUM 
3.90 mmol/LCHLORIDE 109.0 mmol/LCO2 22.0 mmol/LBUN 19.0 mg/dLCREATININE 1.40 
mg/dLSGOT/AST 19.0 IU/LSGPT/ALT 21.0 IU/LALK PHOS 55.0 IU/LTOTAL PROTEIN 6.50 
g/dLALBUMIN 3.90 g/dLTOTAL BILI 0.50 mg/dLCALCIUM 9.60 mg/dLeGFR 38 
TRIGLYCERIDES 121.0 mg/dLCHOLESTEROL 192.0 mg/dLHDL 51.0 mg/dLLDL 121.0 mg/dLTSH
 1.210 uIU/mL







History Of Immunizations







       Name    Date Admin    Mfg Name    Mfg Code    Trade Name    Lot#    Route    Inj    Vis Given    Vis

 Pub                                    CVX

 

        Influenza    2008    Not Entered    NE      Not Entered            Not Entered    Not Entered

                    1            999

 

          Pneumococcal    2008    [employer]    MSD       Pneumovax 23              Intramuscular

                Not Entered     1        999

 

           Influenza    10/15/2010    [employer].    NOV        Fluvirin > 12 Years    

[id number]     Intramuscular    Left Deltoid    10/15/2010    2009    999

 

          Pneumococcal    3/23/2015    [facility]    [facility]       Prevnar 13    [id number]    Intramuscular

                Right Gluteous Medius    3/23/2015       2013       109







History of Past Illness







                    Name                Date of Onset       Comments

 

                    Peritoneal Neoplasm, Malignant                         

 

                    Hyperlipidemia                           

 

                    Bipolar disorder, unspecified                         

 

                    Artificial opening status; colostomy                         

 

                    B12 deficiency                           

 

                    Anemia, Pernicious                         

 

                    Arthritis unspecified                         

 

                    cervical cancer                          

 

                    Artificial opening status; colostomy    2010  1:10PM     

 

                    Bipolar disorder, unspecified    2010  1:10PM     

 

                    Hyperlipidemia      2010  1:10PM     

 

                    Anemia, Pernicious    2010  1:10PM     

 

                    Postoperative Follow-Up    2010  1:55PM     

 

                    Postoperative Follow-Up    Mar  8 2010 10:57AM     

 

                    Artificial opening status; colostomy    Mar  8 2010  1:19PM     

 

                    Peritoneal Neoplasm, Malignant    Mar  8 2010  1:19PM     

 

                    Artificial opening status; colostomy    2010  1:40PM     

 

                    Hyperlipidemia      2010  1:40PM     

 

                    Anemia, Pernicious    2010  1:40PM     

 

                    Peritoneal Neoplasm, Malignant    2010  1:40PM     

 

                    Arthritis unspecified    2010  1:40PM     

 

                    Anemia of Chronic Illness    2010  1:40PM     

 

                    Tinea corporis      2010  3:17PM     

 

                    Bipolar disorder, unspecified    2010  1:33PM     

 

                    Hyperlipidemia      2010  1:33PM     

 

                    Anemia, Pernicious    2010  1:33PM     

 

                    Peritoneal Neoplasm, Malignant    2010  1:33PM     

 

                    B12 deficiency      2010  1:33PM     

 

                    Ethmoidal Sinusitis, Acute    Sep 21 2010  3:53PM     

 

                    Wheezing            Sep 21 2010  3:53PM     

 

                    Flu                 Oct 15 2010  1:40PM     

 

                    Bipolar disorder, unspecified    Oct 15 2010  1:42PM     

 

                    Hyperlipidemia      Oct 15 2010  1:42PM     

 

                    Anemia, Pernicious    Oct 15 2010  1:42PM     

 

                    Peritoneal Neoplasm, Malignant    Oct 15 2010  1:42PM     

 

                    Bipolar disorder, unspecified    2011 12:01PM     

 

                    Hyperlipidemia      2011 12:01PM     

 

                    Anemia, Pernicious    2011 12:01PM     

 

                    Peritoneal Neoplasm, Malignant    2011 12:01PM     

 

                    Bipolar disorder, unspecified    Apr 15 2011 10:55AM     

 

                    Major Depression    2011 10:11AM     

 

                    Bipolar Disorder    2011 10:11AM     

 

                    Cancer              May 10 2011  4:16PM     

 

                    Major Depression    May 10 2011  3:16PM     

 

                    Bipolar Disorder    May 10 2011  3:16PM     

 

                    Hypercalcemia       May 23 2011  2:47PM     

 

                    Bipolar disorder, unspecified    May 23 2011  2:47PM     

 

                    Colon Cancer, Personal History    May 23 2011  2:47PM     

 

                    Bipolar Disorder    May 31 2011  4:39PM     

 

                    Depressive Disorder    2011 10:01AM     

 

                    Vitamin B12 deficiency    2011 10:01AM     

 

                    Vitamin D Deficiency    2011  5:07PM     

 

                    Anemia, Vitamin B12 Deficiency    2011  5:07PM     

 

                    B12 deficiency      2011  3:56PM     

 

                    Routine gynecological examination    Aug  4 2011  9:08AM     

 

                    Screening Examination for Breast Cancer    Aug  4 2011  9:08AM     

 

                    Tinea Corporis      Aug  4 2011  9:08AM     

 

                    Depressive Disorder    Sep 23 2011  8:47AM     

 

                    Contact Dermatitis    Sep 23 2011  8:47AM     

 

                    Anemia, Pernicious    Sep 23 2011  8:47AM     

 

                    B12 deficiency      Sep 23 2011  8:47AM     

 

                    B12 deficiency      Sep 27 2011  2:58PM     

 

                    B12 deficiency      Oct 20 2011  2:34PM     

 

                    Flu                 Dec  9 2011  3:16PM     

 

                    B12 deficiency      Dec  9 2011  3:17PM     

 

                    B12 deficiency      2012  4:52PM     

 

                    B12 deficiency      2012 11:10AM     

 

                    B12 deficiency      2012  3:37PM     

 

                    B12 deficiency      May  3 2012  4:10PM     

 

                    B12 deficiency      2012  2:54PM     

 

                    B12 deficiency      2012 11:23AM     

 

                    B12 deficiency      Aug  9 2012  2:08PM     

 

                    B12 deficiency      Sep  6 2012  4:36PM     

 

                    B12 deficiency      Oct 16 2012 10:23AM     

 

                    Flu                 Feb  2013  3:11PM     

 

                    Bipolar disorder, unspecified    Feb  2013  2:48PM     

 

                    Anemia, Pernicious    Feb  2013  2:48PM     

 

                    B12 deficiency      Feb  4   2:48PM     

 

                    Extrapyramidal abnormal movement disorder    Feb  4   2:48PM     

 

                    B12 deficiency      Apr  3 2013 12:03PM     

 

                    Bipolar disorder, unspecified    May  7 2013  1:31PM     

 

                    Anemia, Pernicious    May  7 2013  1:31PM     

 

                    B12 deficiency      May  7 2013  1:31PM     

 

                    Extrapyramidal abnormal movement disorder    May  7 2013  1:31PM     

 

                    B12 deficiency      2013  3:42PM     

 

                    B12 deficiency      2013  1:31PM     

 

                    Hyperlipidemia      Aug  7 2013 10:37AM     

 

                    Vitamin D Deficiency    Aug  7 2013 10:37AM     

 

                    Bipolar disorder, unspecified    Aug  7 2013 10:37AM     

 

                    Anemia, Pernicious    Aug  7 2013 10:37AM     

 

                    B12 deficiency      Aug  7 2013 10:37AM     

 

                    B12 deficiency      Sep 25 2013 11:15AM     

 

                    B12 deficiency      Dec 11 2013  3:16PM     

 

                    B12 deficiency      Mar  6 2014  1:48PM     

 

                    B12 deficiency      May 21 2014  3:17PM     

 

                    Screening Examination for Breast Cancer    2014  3:23PM     

 

                    Periumbilical abdominal pain    2014  3:23PM     

 

                    B12 deficiency      Jul 10 2014  2:52PM     

 

                    Anemia, Vitamin B12 Deficiency    Aug 13 2014  4:50PM     

 

                    Bipolar disorder    Oct 16 2014 11:13AM     

 

                    Hyperlipidemia      Oct 16 2014 11:13AM     

 

                    Anemia, Pernicious    Oct 16 2014 11:13AM     

 

                    Peritoneal Neoplasm, Malignant    Oct 16 2014 11:13AM     

 

                    Screening breast examination    Oct 16 2014 11:13AM     

 

                    Weight loss         Oct 16 2014 11:13AM     

 

                    Anemia, Pernicious    Mar 23 2015  2:57PM     

 

                    B12 deficiency      Mar 23 2015  2:57PM     

 

                    Need for Prevnar vaccine    Mar 23 2015  2:57PM     

 

                    Bipolar disorder    Mar 23 2015  2:57PM     

 

                    Hyperlipidemia      Mar 23 2015  2:57PM     

 

                    Anemia, Pernicious    Mar 23 2015  2:57PM     

 

                    Peritoneal Neoplasm, Malignant    Mar 23 2015  2:57PM     

 

                    B12 deficiency      May  4 2015  4:48PM     

 

                    Hyperlipidemia      May 13 2015  9:56AM     

 

                    Anemia              May 13 2015  9:56AM     

 

                    Bipolar disorder    May 13 2015  9:56AM     

 

                    Bipolar disorder    May 14 2015  3:27PM     

 

                    Hyperlipidemia      May 14 2015  3:27PM     

 

                    Anemia, Pernicious    May 14 2015  3:27PM     

 

                    Peritoneal Neoplasm, Malignant    May 14 2015  3:27PM     

 

                    B12 deficiency      2015  2:20PM     

 

                    B12 deficiency      2015 11:34AM     

 

                    B12 deficiency      Aug 18 2015  9:06AM     

 

                    Tinea Corporis      Sep 18 2015  8:54AM     

 

                    B12 deficiency      Sep 18 2015  8:54AM     

 

                    B12 deficiency      2015 10:28AM     

 

                    Herpes zoster without complication    Dec  3 2015  9:52AM     

 

                    B12 deficiency      Dec 23 2015 11:21AM     







Payers







           Insurance Name    Company Name    Plan Name    Plan Number    Policy Number    Policy Group

 Number                                 Start Date

 

                    [facility]              [id number]              N/A

 

                    Medicare Part A    Medicare Part A              [id number]              N/A

 

                    Medicare Part B    [employer] [place]              [id number]              2006

 

                          [employer] Financial [employer]    [employer]                 50 percent

                                                    2009







History of Encounters







                    Visit Date          Visit Type          Provider

 

                    2015          Nurse visit         Bhupinder Louise DO

 

                    12/3/2015           Office visit        Bhupinder Aspen DO

 

                    2015          Nurse visit         Bhupinder Aspen DO

 

                    2015           Office visit        Bhupinder Aspen DO

 

                    2015           Nurse visit         Bhupinder Aspen DO

 

                    2015            Nurse visit         Bhupinder Aspen DO

 

                    2015            Nurse visit         Bhupinder Aspen DO

 

                    2015           Office visit        Bhupinder Aspen DO

 

                    2015            Nurse visit         Bhupinder Aspen DO

 

                    3/23/2015           Office visit        Bhupinder Aspen DO

 

                    10/16/2014          Office visit        Bhupinder Aspen DO

 

                    2014           Nurse visit         Radha GREEN

 

                    7/10/2014           Nurse visit         Bhupinder Aspen DO

 

                    2014           Office visit        Bhupinder Aspen DO

 

                    2014           Nurse visit         Bhupinder Aspen DO

 

                    3/6/2014            Nurse visit         Bhupinder Aspen DO

 

                    2014            Yasmine Lopez MD

 

                    2013          Nurse visit         Bhupinder Aspen DO

 

                    2013           Nurse visit         Bhupinder Aspen DO

 

                    2013            Office visit        Bhupinder Aspen DO

 

                    2013            Nurse visit         Bhupinder Aspen DO

 

                    2013            Nurse visit         Bhupinder Aspen DO

 

                    2013            Office visit        Bhupinder Aspen DO

 

                    4/3/2013            Nurse visit         Bhupinder Aspen DO

 

                    2013            Office visit        Bhupinder Aspen DO

 

                    10/16/2012          Nurse visit         Bhupinder Aspen DO

 

                    2012            Nurse visit         Bhupinder Aspen DO

 

                    2012            Voided              Bhupinder Aspen DO

 

                    2012            Nurse visit         Bhupinder Aspen DO

 

                    2012            Nurse visit         Bhupinder Aspen DO

 

                    2012           Nurse visit         Bhupinder Aspen DO

 

                    5/3/2012            Nurse visit         Bhupinder Aspen DO

 

                    2012           Nurse visit         Bhupinder Aspen DO

 

                    2012           Nurse visit         Bhupinder Aspen DO

 

                    2012           Nurse visit         Bhupinder Aspen DO

 

                    2011           Nurse visit         Bhupinder Aspen DO

 

                    10/20/2011          Nurse visit         Bhupinder Aspen DO

 

                    2011           Office visit        Bhupinder Aspen DO

 

                    2011           Nurse visit         Radha GREEN

 

                    2011            Office visit        Bhupinder Aspen DO

 

                    2011           Nurse visit         Bhupinder Aspen DO

 

                    2011            Office visit        Bhupinder Aspen DO

 

                    2011           Office visit        Bhupinder Aspen DO

 

                    5/10/2011           Office visit        Bhupinder Aspen DO

 

                    2011           Office visit        Bhupinder Aspen DO

 

                    4/15/2011           Office visit        Devin Angel DO

 

                    2011           Office visit        Devin Angel DO

 

                    10/15/2010          Office visit        Devin Angel DO

 

                    2010           Office visit        Devin Angel DO

 

                    2010            Office visit        Devin Angel DO

 

                    2010           Office visit        Devin Angel DO

 

                    2010            Office visit        Devin Angel DO

 

                    3/8/2010            Office visit        Devin Masterson MD

 

                    2010            Surgery             Devin Masterson MD

 

                    2010            Office visit        Devin Angel DO

 

                    2010           Surgery             Devin Masterson MD

 

                    2010           Hospital            Devin Masterson MD

 

                    2010           Hospital            Devin Masterson MD

 

                    10/22/2009          Office visit        Devin Angel DO

## 2019-06-26 NOTE — XMS REPORT
MU2 Ambulatory Summary

                             Created on: 2017



Pauline Gan

External Reference #: [id number]

: 1950

Sex: Female



Demographics







                          Address                   [address]

[place]  [id number]

 

                          Home Phone                [phone]

 

                          Preferred Language        English

 

                          Marital Status            Legally 

 

                          Gnosticist Affiliation     Unknown

 

                          Race                      White

 

                          Ethnic Group              Not  or 





Author







                          Bhupinder Aguilar

 

                          [facility]

 

                          Address                   [address]

[place]  [id number]



 

                          Phone                     [phone]







Care Team Providers







                    Care Team Member Name    Role                Phone

 

                    Bhupinder Louise    PCP                 Unavailable

 

                    Bhupinder Louise    PreferredProvider    Unavailable







Allergies and Adverse Reactions







                    Name                Reaction            Notes

 

                    NO KNOWN DRUG ALLERGIES                         







Plan of Treatment







             Planned Activity    Comments     Planned Date    Planned Time    Plan/Goal

 

             Injection, Subcutaneous/IM                 2016    12:00 AM      

 

             Injection, Subcutaneous/IM                 2016    12:00 AM      

 

             Mammography; bilateral                 2016    12:00 AM      

 

             Injection, Subcutaneous/IM                 2016    12:00 AM      

 

             URINALYSIS ROUTINE C&S IF IND                 1/3/2017     12:00 AM      

 

             CBC with Auto                 2013     12:00 AM      

 

             Lithium                   2013     12:00 AM      

 

             Basic metabolic panel                 2013     12:00 AM      

 

             Vitamin B12 (cyanocobalamin)                 2013     12:00 AM      

 

             Folic acid, serum                 2013     12:00 AM      

 

             Injection,Subcutaneous/Intramuscul                 2013    12:00 AM      







Medications







                                        Active 

 

             Name         Start Date    Estimated Completion Date    SIG          Comments

 

                Latuda 20 mg oral tablet                                    take 1 tablet (20 mg) by oral route once daily with

 food (at least 350 calories)            

 

             pravastatin 40 mg oral tablet    3/30/2015                 TAKE 1 TABLET BY MOUTH DAILY     

 

                Namenda XR 28 mg oral capsule,sprinkle,ER 24hr    2015                       take 1 capsule (28

 mg) by oral route once daily            

 

                Namenda XR 28 mg oral capsule,sprinkle,ER 24hr    2016                       take 1 capsule (28

 mg) by oral route once daily            

 

                triamcinolone acetonide 0.1 % topical cream    2016                        apply a thin layer to 

the affected area(s) by topical route 2 times per day     

 

                potassium chloride 10 mEq oral tablet extended release    2016                       take 1 tablet

 (10 meq) by oral route once daily       

 

             pravastatin 40 mg oral tablet    2016                 TAKE 1 TABLET BY MOUTH DAILY     

 

                Vitamin B-12 1,000 mcg/mL injection solution    2016                       inject 1 milliliter 

(1,000 mcg) by intramuscular route once a month     

 

                potassium chloride 10 mEq oral tablet extended release    2016                      take 1 tablet

 (10 meq) by oral route once daily       

 

                Namenda XR 28 mg oral capsule,sprinkle,ER 24hr    2016                      TAKE 1 CAPSULE BY

 MOUTH EVERY DAY                         

 

                furosemide 40 mg oral tablet    2016                      take 1 tablet (40 mg) by oral route

 once daily                              

 

                Bactrim -160 mg oral tablet    2016        take 1 tablet by oral route

 every 12 hours for 7 days               









                                         

 

             Name         Start Date    Expiration Date    SIG          Comments

 

             Reglan 10mg    3/29/2010    2010    one ac and hs     

 

                Keflex 500 mg oral capsule    2010       10/1/2010       take 1 capsule (500 mg) by oral

 route every 6 hours for 10 days         

 

                Bactrim -160 mg oral tablet    2011       take 1 tablet by oral route

 every 12 hours for 7 days               

 

                triamcinolone acetonide 0.1 % topical cream    2011      apply a thin

 layer to the affected area(s) by topical route 2 times per day     

 

                sertraline 100 mg oral tablet    4/10/2012       5/10/2012       take 1.5 tablets by oral route

 daily for 30 days                       

 

                ergocalciferol (vitamin D2) 50,000 unit oral capsule    4/15/2013       2013       TAKE

 ONE CAPSULE BY MOUTH ONCE A WEEK        

 

                CYANOCOBALAM 1000MCGINJ 1000 milliliter    2013       INJECT 1ML INTRAMUSCULAR

 ONCE A MONTH                            

 

                pravastatin 40 mg oral tablet    3/25/2014       3/20/2015       TAKE ONE TABLET BY MOUTH EVERY

 DAY                                     

 

                          Zostavax (PF) 19,400 unit/0.65 mL subcutaneous suspension for reconstitution    3/23/2015

                    3/24/2015           inject 0.65 milliliter by subcutaneous route once     

 

                famciclovir 500 mg oral tablet    12/3/2015       12/10/2015      take 1 tablet (500 mg) by

 oral route every 8 hours for 7 days     

 

                furosemide 40 mg oral tablet    2016      take 1 tablet (40 mg) by oral

 route once daily                        

 

                Cipro 500 mg oral tablet    2016       take 1 tablet (500 mg) by oral route

 2 times per day for 5 days              









                                        Discontinued 

 

             Name         Start Date    Discontinued Date    SIG          Comments

 

                Tylenol 325 mg oral tablet                    2013        take 1 - 2 tablets (325 -650 mg) by oral

 route every 4-6 hours as needed         

 

                Calcium 600 + D(3) 600 mg(1,500mg) -400 unit oral tablet                    2011       take 1 tablet

 by oral route 2 times a day            no longer taking

 

                Vitamin B-12 1,000 mcg oral tablet extended release    2010       take 1

 tablet by oral route daily             no longer taking

 

                Antifungal (clotrimazole) 1 % topical cream    2010       apply to the 

affected and surrounding areas of skin by topical route 2 times per day morning 
and evening                              

 

                sertraline 100 mg oral tablet    5/10/2011       2011       take 2 tablets (200 mg) by 

oral route once daily                   discontinued by Dr. Serrano

 

                mirtazapine 15 mg oral tablet                    2011        take 1 tablet (15 mg) by oral route 

once daily before bedtime               Dr. Serrano

 

                mirtazapine 15 mg oral tablet                    2011        take 1 tablet (15 mg) by oral route 

once daily before bedtime               dc'd by Dr. Serrano

 

                Pristiq 50 mg oral tablet extended release 24 hr                    2013        take 1 tablet (50

 mg) by oral route once daily           Dr. Serrano

 

                Pristiq 50 mg oral tablet extended release 24 hr                    2013        take 1 tablet (50

 mg) by oral route once daily           dose updated

 

                Vitamin B-12 1,000 mcg/mL injection solution    2011        inject 1 milliliter

 (1,000 mcg) by intramuscular route once a month    on list already

 

                    syringe with needle 1 mL 25 gauge x 1" miscellaneous syringe    2011

                          use for injection once a month     

 

                clotrimazole 1 % topical cream    2011        apply to the affected and surrounding

 areas of skin by topical route 2 times per day in the morning and evening     

 

                Vitamin D2 50,000 unit oral capsule    2011        take 1 capsule (50,000

 unit) by oral route once weekly        generic on list

 

                Pravachol 40 mg oral tablet    2012        take 1 tablet (40 mg) by oral 

route once daily for 90 days            generic on list

 

                lithium carbonate 300 mg oral capsule    2012        take 1 capsule by oral

 route daily                            dose updated

 

                Pristiq 100 mg oral tablet extended release 24 hr                    4/10/2012       take 1 and 1/2 

tablet (150 mg) by oral route once daily    Mental Health provider

 

                Pristiq 100 mg oral tablet extended release 24 hr                    4/10/2012       take 1 and 1/2 

tablet (150 mg) by oral route once daily    Discontinued by Dr Efrain Knight at [facility]

 

                hydroxyzine HCl 50 mg oral tablet    10/16/2014      2015       take 1 tablet (50 mg) 

by oral route at bedtime                 

 

                lithium carbonate 300 mg oral capsule    2015       take 1 capsule (300

 mg) by oral route 2 for 30 days         

 

                fluconazole 100 mg oral tablet    2015       12/3/2015       take 1 tablet (100 mg) by 

oral route once a week                   

 

                ketoconazole 2 % topical cream    2015       12/3/2015       apply to the affected area(s)

 by topical route 2 times per day        

 

                prednisone 10 mg oral tablet    12/3/2015       2016        take 2 tablets (20 mg) by oral

 route once daily for 4 days 1 tablet daily for 4 days 0.5 tablet daily for 4 
days                                     







Problem List







                    Description         Status              Onset

 

                    Artificial opening status; colostomy    Active               

 

                    Bipolar disorder, unspecified    Active               

 

                    Hyperlipidemia      Active               

 

                    Peritoneal Neoplasm, Malignant    Active               

 

                    Anemia, Pernicious    Active               

 

                    Arthritis unspecified    Active               

 

                    B12 deficiency      Active               







Vital Signs







      Date    Time    BP-Sys(mm[Hg]    BP-Lynn(mm[Hg])    HR(bpm)    RR(rpm)    Temp    WT    HT    HC    BMI

                    BSA                 BMI Percentile      O2 Sat(%)

 

       2016    3:11:00 PM    134 mmHg    76 mmHg    80 bpm    20 rpm    98 F    163 lbs    69 in     

                24.07 kg/m2     1.90 m2                         98 %

 

        2016    2:04:00 PM    142 mmHg    86 mmHg    68 bpm    16 rpm    98.5 F    166 lbs    63 in

                          29.4053 kg/m    1.8295 m                 100 %

 

        2016    11:27:00 AM    148 mmHg    78 mmHg    90 bpm    20 rpm    98.2 F    153 lbs    69 in

                          22.59 kg/m2    1.84 m2                   96 %

 

        12/3/2015    9:50:00 AM    132 mmHg    70 mmHg    62 bpm    16 rpm    97.9 F    145 lbs    69 in

                          21.4125 kg/m    1.7894 m                 100 %

 

        2015    8:52:00 AM    132 mmHg    68 mmHg    52 bpm    20 rpm    97.8 F    141 lbs    69 in

                          20.82 kg/m2    1.76 m2                   100 %

 

        2015    3:25:00 PM    120 mmHg    62 mmHg    72 bpm    16 rpm    98.1 F    136 lbs    69 in

                          20.0835 kg/m    1.733 m                 98 %

 

       3/23/2015    2:55:00 PM    130 mmHg    76 mmHg    68 bpm    18 rpm    97 F    140 lbs    69 in    

                20.67 kg/m2     1.76 m2                         98 %

 

        10/16/2014    11:11:00 AM    120 mmHg    66 mmHg    77 bpm    20 rpm    98 F    130 lbs    69 in

                          19.1974 kg/m    1.6943 m                 100 %

 

        2014    3:21:00 PM    130 mmHg    66 mmHg    63 bpm    18 rpm    97.2 F    160 lbs    69 in

                          23.63 kg/m2    1.88 m2                   99 %

 

        2013    10:35:00 AM    132 mmHg    70 mmHg    66 bpm    20 rpm    98.1 F    157 lbs    69 in

                          23.1846 kg/m    1.862 m                  

 

        2013    1:29:00 PM    132 mmHg    70 mmHg    76 bpm    18 rpm    98.2 F    166 lbs    69 in 

                          24.51 kg/m2    1.91 m2                    

 

       2013    2:46:00 PM    128 mmHg    70 mmHg    76 bpm    16 rpm    98 F    160 lbs    69 in     

                23.6276 kg/m    1.8797 m                       

 

        2011    8:49:00 AM    128 mmHg    78 mmHg    70 bpm    18 rpm    97.9 F    164 lbs    69 in

                          24.22 kg/m2    1.90 m2                    

 

     2011    1:31:00 PM    132 mmHg    68 mmHg    84 bpm         97 F    167 lbs                        

                                         

 

        2011    9:09:00 AM    128 mmHg    70 mmHg    72 bpm    18 rpm    98.2 F    163 lbs    64 in 

                          27.98 kg/m2    1.83 m2                    

 

       2011    10:01:00 AM    132 mmHg    70 mmHg    72 bpm    18 rpm    98.2 F    154 lbs             

                                                                 

 

       2011    2:47:00 PM    128 mmHg    70 mmHg    72 bpm    18 rpm    97.8 F    156 lbs             

                                                                 

 

       5/10/2011    3:16:00 PM    144 mmHg    80 mmHg    72 bpm    18 rpm    98.2 F    158 lbs             

                                                                 

 

        2011    10:11:00 AM    132 mmHg    70 mmHg    70 bpm    18 rpm    98.2 F    168 lbs    69 in

                          24.81 kg/m2    1.93 m2                    

 

        4/15/2011    10:52:00 AM    110 mmHg    60 mmHg    75 bpm    16 rpm    97.5 F    172.375 lbs    

69 in                     25.4551 kg/m    1.951 m                 100 %

 

        2011    11:43:00 AM    120 mmHg    82 mmHg    75 bpm    16 rpm    97.2 F    178.5 lbs    69

 in                       26.36 kg/m2    1.99 m2                   100 %

 

        10/15/2010    1:32:00 PM    120 mmHg    70 mmHg    80 bpm    18 rpm    96.6 F    177 lbs    69 in

                          26.1381 kg/m    1.977 m                 100 %

 

        2010    3:50:00 PM    168 mmHg    100 mmHg    82 bpm    18 rpm    97.8 F    177.5 lbs    69

 in                       26.21 kg/m2    1.98 m2                   97 %

 

        2010    1:21:00 PM    140 mmHg    80 mmHg    59 bpm    16 rpm    97.6 F    173.25 lbs    69 

in                        25.5843 kg/m    1.956 m                 100 %

 

        2010    3:02:00 PM    140 mmHg    80 mmHg    61 bpm    16 rpm    97.6 F    173.125 lbs    69

 in                       25.57 kg/m2    1.96 m2                   99 %

 

        2010    1:23:00 PM    130 mmHg    80 mmHg    66 bpm    16 rpm    96.8 F    173 lbs    69 in 

                          25.5474 kg/m    1.9546 m                 100 %

 

        2010    12:58:00 PM    130 mmHg    88 mmHg    75 bpm    16 rpm    98.4 F    172.25 lbs    69

 in                       25.44 kg/m2    1.95 m2                   100 %







Social History







                    Name                Description         Comments

 

                    denies alcohol use                         

 

                    denies smoking                           

 

                    Denies illicit substance abuse                         

 

                    retired                                 direct care

 

                    Single                                   

 

                    Exercises regularly                         

 

                    Attended some college                         

 

                    Tobacco             Never smoker         







History of Procedures







                    Date Ordered        Description         Order Status

 

                    2010 12:00 AM    COMPREHEN METABOLIC PANEL    Reviewed

 

                    2010 12:00 AM    COMPLETE CBC W/AUTO DIFF WBC    Reviewed

 

                    2010 12:00 AM    LIPID PANEL         Reviewed

 

                          2015 12:00 AM        B12 Injection, Up to 1000 Mcg NDC#3316-7851-11 [facility] 

                                        Reviewed

 

                    2011 12:00 AM    MAMMOGRAM SCREENING    Reviewed

 

                    2011 12:00 AM    CYTOPATH C/V THIN LAYER    Reviewed

 

                    2011 12:00 AM    B12 Injection 1 cc NDC#[address]-04    Reviewed

 

                    2015 12:00 AM    THER/PROPH/DIAG INJ SC/IM    Reviewed

 

                    2015 12:00 AM    B12 Injection, Up to 1000 Mcg NDC#6969-2138-19    Reviewed

 

                    2011 12:00 AM    THER/PROPH/DIAG INJ SC/IM    Reviewed

 

                    2011 12:00 AM    B12 Injection([place]) Ndc#0567-4394-64-    Reviewed

 

                    2015 12:00 AM    THER/PROPH/DIAG INJ SC/IM    Reviewed

 

                    2015 12:00 AM    B12 Injection, Up to 1000 Mcg NDC#7504-7274-41    Reviewed

 

                    10/20/2011 12:00 AM    THER/PROPH/DIAG INJ SC/IM    Reviewed

 

                    10/20/2011 12:00 AM    B12 Injection([place]) Ndc#2000-1654-26-    Reviewed

 

                    2016 12:00 AM    THER/PROPH/DIAG INJ SC/IM    Reviewed

 

                    2016 12:00 AM    B12 Injection, Up to 1000 Mcg NDC#5768-7573-93    Reviewed

 

                    3/14/2016 12:00 AM    VITAMIN B-12        Reviewed

 

                    3/15/2016 12:00 AM    THER/PROPH/DIAG INJ SC/IM    Reviewed

 

                    3/15/2016 12:00 AM    B12 Injection, Up to 1000 Mcg NDC#9167-2340-96    Reviewed

 

                    2011 12:00 AM    ***Immunization administration, Medicare flu    Reviewed

 

                    2011 12:00 AM    Fluzone ** MEDICARE Only **    Reviewed

 

                    2011 12:00 AM    THER/PROPH/DIAG INJ SC/IM    Reviewed

 

                    2011 12:00 AM    B12 Injection ([facility]) Ndc#8473-7331-16    Reviewed

 

                    2016 12:00 AM    B12 Injection, Up to 1000 Mcg NDC#0612-6716-69 [facility]    

Reviewed

 

                    2016 12:00 AM    TTE W/DOPPLER COMPLETE    Reviewed

 

                    2016 12:00 AM    EXTREMITY STUDY     Returned

 

                          2016 12:00 AM        B12 Injection, Up to 1000 Mcg NDC#6223-9339-37 [facility] 

                                        Reviewed

 

                    2016 12:00 AM    THER/PROPH/DIAG INJ SC/IM    Reviewed

 

                    2012 12:00 AM    THER/PROPH/DIAG INJ SC/IM    Reviewed

 

                    2012 12:00 AM    B12 Injection ([employer] [facility]) Ndc#4469-4077-30    Reviewed

 

                    2016 12:00 AM    THER/PROPH/DIAG INJ SC/IM    Reviewed

 

                    2016 12:00 AM    B12 Injection, Up to 1000 Mcg NDC#8119-0959-69    Reviewed

 

                    2012 12:00 AM    THER/PROPH/DIAG INJ SC/IM    Reviewed

 

                    2012 12:00 AM    B12 Injection([place]) Ndc#6251-2315-57-    Reviewed

 

                    12/15/2016 12:00 AM    B12 Injection, Up to 1000 Mcg NDC#4862-9372-65    Reviewed

 

                    12/15/2016 12:00 AM    THER/PROPH/DIAG INJ SC/IM    Reviewed

 

                    2016 12:00 AM    URNLS DIP STICK/TABLET RGNT AUTO W/O MICROSCOPY    Returned

 

                    5/3/2012 12:00 AM    THER/PROPH/DIAG INJ SC/IM    Reviewed

 

                    5/3/2012 12:00 AM    B12 Injection([place]) Ndc#8937-5953-36-    Reviewed

 

                    2012 12:00 AM    IMMUNOTHERAPY INJECTIONS    Reviewed

 

                    2012 12:00 AM    B12 Injection([place]) Ndc#8599-5038-22-    Reviewed

 

                    2012 12:00 AM    THER/PROPH/DIAG INJ SC/IM    Reviewed

 

                    2012 12:00 AM    B12 Injection, Up to 1000 Mcg NDC#3604-9217-97    Reviewed

 

                    2012 12:00 AM    THER/PROPH/DIAG INJ SC/IM    Reviewed

 

                    2012 12:00 AM    B12 Injection, Up to 1000 Mcg NDC#2189-1723-84    Reviewed

 

                    2012 12:00 AM    THER/PROPH/DIAG INJ SC/IM    Reviewed

 

                    2012 12:00 AM    B12 Injection, Up to 1000 Mcg NDC#0110-5727-79    Reviewed

 

                    10/16/2012 12:00 AM    THER/PROPH/DIAG INJ SC/IM    Reviewed

 

                    10/16/2012 12:00 AM    B12 Injection, Up to 1000 Mcg NDC#6819-4843-12    Reviewed

 

                    2010 12:00 AM    COMPREHEN METABOLIC PANEL    Reviewed

 

                    2010 12:00 AM    COMPLETE CBC W/AUTO DIFF WBC    Reviewed

 

                    2010 12:00 AM    LIPID PANEL         Reviewed

 

                    2013 12:00 AM    Flu Injection 3 Years And Above NDC# [address]-52  [facility]    Reviewed



 

                    2013 12:00 AM    COMPLETE CBC W/AUTO DIFF WBC    Reviewed

 

                    2013 12:00 AM    ASSAY OF LITHIUM    Reviewed

 

                    2013 12:00 AM    METABOLIC PANEL TOTAL CA    Reviewed

 

                    4/3/2013 12:00 AM    THER/PROPH/DIAG INJ SC/IM    Reviewed

 

                    4/3/2013 12:00 AM    B12 Injection, Up to 1000 Mcg NDC#7091-5605-29    Reviewed

 

                    2013 12:00 AM    THER/PROPH/DIAG INJ SC/IM    Reviewed

 

                    2013 12:00 AM    B12 Injection, Up to 1000 Mcg NDC#3678-7445-94    Reviewed

 

                    2013 12:00 AM    THER/PROPH/DIAG INJ SC/IM    Reviewed

 

                    2013 12:00 AM    B12 Injection, Up to 1000 Mcg NDC#3229-9251-67    Reviewed

 

                    2013 12:00 AM    LIPID PANEL         Reviewed

 

                    2013 12:00 AM    VITAMIN D 25 HYDROXY    Reviewed

 

                    2013 12:00 AM    THER/PROPH/DIAG INJ SC/IM    Reviewed

 

                    3/6/2014 12:00 AM    THER/PROPH/DIAG INJ SC/IM    Reviewed

 

                    2014 12:00 AM    THER/PROPH/DIAG INJ SC/IM    Reviewed

 

                    2014 12:00 AM    B12 Injection, Up to 1000 Mcg NDC#1733-8695-93    Reviewed

 

                    2010 12:00 AM    SKIN FUNGI CULTURE    Reviewed

 

                    10/9/2010 12:00 AM    COMPREHEN METABOLIC PANEL    Reviewed

 

                    10/9/2010 12:00 AM    LIPID PANEL         Reviewed

 

                    2010 12:00 AM    THER/PROPH/DIAG INJ SC/IM    Reviewed

 

                    2010 12:00 AM    B12 Injection Ndc#[address]-29 ([place])    Reviewed

 

                    2010 12:00 AM    THER/PROPH/DIAG INJ SC/IM    Reviewed

 

                    2010 12:00 AM    Kenalog 40 Mg Im-Ndc#[address]-86 ([place])    Reviewed

 

                    10/15/2010 12:00 AM    FLU VACCINE 3 YRS & > IM    Reviewed

 

                    10/15/2010 12:00 AM    Admin.Of M/C Cov.Vaccine-Flu Vacc.    Reviewed

 

                    1/15/2011 12:00 AM    COMPLETE CBC W/AUTO DIFF WBC    Reviewed

 

                    1/15/2011 12:00 AM    COMPREHEN METABOLIC PANEL    Reviewed

 

                    1/15/2011 12:00 AM    LIPID PANEL         Reviewed

 

                    2014 12:00 AM    MAMMOGRAM SCREENING    Reviewed

 

                    2014 12:00 AM    Screening mammography, bilateral    Reviewed

 

                    7/10/2014 12:00 AM    THER/PROPH/DIAG INJ SC/IM    Reviewed

 

                    7/10/2014 12:00 AM    B12 Injection, Up to 1000 Mcg NDC#0159-8658-49    Reviewed

 

                    2011 12:00 AM    COMPLETE CBC W/AUTO DIFF WBC    Reviewed

 

                    2011 12:00 AM    COMPREHEN METABOLIC PANEL    Reviewed

 

                    2011 12:00 AM    LIPID PANEL         Reviewed

 

                    2014 12:00 AM    B12 Injection, Up to 1000 Mcg NDC#4108-6788-12    Reviewed

 

                    10/19/2014 12:00 AM    MAMMOGRAM SCREENING    Reviewed

 

                    10/19/2014 12:00 AM    Screening mammography, bilateral    Reviewed

 

                    10/16/2014 12:00 AM    COMPLETE CBC W/AUTO DIFF WBC    Reviewed

 

                    10/16/2014 12:00 AM    COMPREHEN METABOLIC PANEL    Reviewed

 

                    10/16/2014 12:00 AM    IMMUNOASSAY TUMOR     Reviewed

 

                    10/16/2014 12:00 AM    LIPID PANEL         Reviewed

 

                    10/16/2014 12:00 AM    ASSAY OF LITHIUM    Reviewed

 

                    10/16/2014 12:00 AM    MAMMOGRAM SCREENING    Reviewed

 

                    2011 12:00 AM    ASSAY OF PARATHORMONE    Reviewed

 

                    2011 12:00 AM    VITAMIN D 25 HYDROXY    Reviewed

 

                    2011 12:00 AM    ASSAY OF LITHIUM    Reviewed

 

                    2011 12:00 AM    METABOLIC PANEL TOTAL CA    Reviewed

 

                    2011 12:00 AM    CT HEAD/BRAIN W/O & W/DYE    Reviewed

 

                    3/23/2015 12:00 AM    PNEUMOCOCCAL VACC 13 GLENDY IM    Reviewed

 

                    3/23/2015 12:00 AM    Vitamin B12 injection    Reviewed

 

                    2011 12:00 AM    ASSAY OF LITHIUM    Reviewed

 

                    2011 12:00 AM    B12 Injection Ndc#[address]-85  [place]    Reviewed

 

                    2015 12:00 AM    THER/PROPH/DIAG INJ SC/IM    Reviewed

 

                    2015 12:00 AM    B12 Injection, Up to 1000 Mcg NDC#5596-3478-38    Reviewed

 

                    2015 12:00 AM    COMPLETE CBC W/AUTO DIFF WBC    Reviewed

 

                    2015 12:00 AM    COMPREHEN METABOLIC PANEL    Reviewed

 

                    2015 12:00 AM    LIPID PANEL         Reviewed

 

                    2015 12:00 AM    ASSAY OF LITHIUM    Reviewed

 

                    2011 12:00 AM    VIT D 1 25-DIHYDROXY    Reviewed

 

                    2011 12:00 AM    VITAMIN B-12        Reviewed

 

                    2015 12:00 AM    B12 Injection, Up to 1000 Mcg NDC#4140-7586-04    Reviewed

 

                    2015 12:00 AM    THER/PROPH/DIAG INJ SC/IM    Reviewed

 

                    2015 12:00 AM    B12 Injection, Up to 1000 Mcg NDC#0621-0904-87    Reviewed

 

                    2011 12:00 AM    THER/PROPH/DIAG INJ SC/IM    Reviewed

 

                    2011 12:00 AM    B12 Injection ([employer] [facility]) Ndc#8198-7696-36    Reviewed

 

                    2015 12:00 AM    THER/PROPH/DIAG INJ SC/IM    Reviewed

 

                    2015 12:00 AM    B12 Injection, Up to 1000 Mcg NDC#0253-4167-94    Reviewed







Results Summary







                          Data and Description      Results

 

                          2004 12:00 AM        Colonoscopy-Women and Men over 50 Normal 

 

                          2008 12:00 AM         Pap Smear Declined 

 

                          10/7/2009 12:00 AM        Cholest Cry Stone Ql .0 %LDLc SerPl-mCnc 123.0 mg/dLHDLc

 SerPl-mCnc 34.0 mg/dLTrigl SerPl-mCnc 190.0 mg/dLGlucose SerPl-mCnc 78.0 mg/dL

 

                          2009 12:00 AM        Mammogram -Women over 40 Normal HIV1+2 Ab Ser Ql no risk 

 

                          2010 8:47 AM         Dexa Bone Scan Refused Aspirin reccommended Contraindication 



 

                          2010 8:48 AM         Depression Done 

 

                          2010 12:00 AM         Foot Exam-Diabetic Done 

 

                          2010 12:00 AM         Cholest Cry Stone Ql .0 %LDLc SerPl-mCnc 126.0 mg/dLGlucose

 SerPl-mCnc 102.0 mg/dL

 

                          2010 8:45 AM          TRIGLYCERIDES 122.0 mg/dLCHOLESTEROL 186.0 mg/dLHDL 36.0 mg/dLTOT

 CHOL/HDL 5.2 LDL (CALC) 126.0 mg/dLGLUCOSE 102.0 mg/dLSODIUM 143.0 
mmol/LPOTASSIUM 3.70 mmol/LCHLORIDE 111.0 mmol/LCO2 23.0 mmol/LBUN 10.0 
mg/dLCREATININE 0.80 mg/dLSGOT/AST 12.0 IU/LSGPT/ALT 11.0 IU/LALK PHOS 65.0 
IU/LTOTAL PROTEIN 7.20 g/dLALBUMIN 3.90 g/dLTOTAL BILI 0.50 mg/dLCALCIUM 10.20 
mg/dLAGE 59 GFR NonAA 73 GFR AA 88 eGFR >60 mL/min/1.73 m2eGFR AA* >60 WBC 5.7 
RBC 3.26 HGB 10.60 g/dLHCT 31.70 %MCV 97.0 fLMCH 32.50 pgMCHC 33.40 g/dLRDW SD 
47 RDW CV 13.30 %MPV 9.70 fLPLT 287 NRBC# 0.00 NRBC% 0.0 %NEUT 62.90 %%LYMP 
21.80 %%MONO 9.90 %%EOS 5.0 %%BASO 0.40 %#NEUT 3.56 #LYMP 1.23 #MONO 0.56 #EOS 
0.28 #BASO 0.02 MANUAL DIFF NOT IND 

 

                          2010 12:00 AM        Glucose SerPl-mCnc 96.0 mg/dLCholest Cry Stone Ql .0 %LDLc

 SerPl-mCnc 146.0 mg/dL

 

                          2010 8:26 AM         TRIGLYCERIDES 106.0 mg/dLCHOLESTEROL 199.0 mg/dLHDL 32.0 mg/dLTOT

 CHOL/HDL 6.2 LDL (CALC) 146.0 mg/dLGLUCOSE 96.0 mg/dLSODIUM 143.0 
mmol/LPOTASSIUM 4.0 mmol/LCHLORIDE 113.0 mmol/LCO2 24.0 mmol/LBUN 13.0 
mg/dLCREATININE 1.0 mg/dLSGOT/AST 11.0 IU/LSGPT/ALT 6.0 IU/LALK PHOS 56.0 
IU/LTOTAL PROTEIN 6.60 g/dLALBUMIN 3.80 g/dLTOTAL BILI 0.50 mg/dLCALCIUM 9.30 
mg/dLAGE 59 GFR NonAA 57 GFR AA 69 eGFR 57 eGFR AA* >60 

 

                          10/6/2010 12:00 AM        Cholest Cry Stone Ql .0 %LDLc SerPl-mCnc 111.0 mg/dLGlucose

 SerPl-mCnc 81.0 mg/dL

 

                          10/6/2010 2:45 PM         TRIGLYCERIDES 123.0 mg/dLCHOLESTEROL 178.0 mg/dLHDL 42.0 mg/dLTOT

 CHOL/HDL 4.2 LDL (CALC) 111.0 mg/dLGLUCOSE 81.0 mg/dLSODIUM 139.0 
mmol/LPOTASSIUM 4.10 mmol/LCHLORIDE 106.0 mmol/LCO2 24.0 mmol/LBUN 13.0 
mg/dLCREATININE 0.90 mg/dLSGOT/AST 13.0 IU/LSGPT/ALT 11.0 IU/LALK PHOS 61.0 
IU/LTOTAL PROTEIN 7.10 g/dLALBUMIN 3.90 g/dLTOTAL BILI 0.30 mg/dLCALCIUM 9.30 
mg/dLAGE 60 GFR NonAA 64 GFR AA 78 eGFR >60 mL/min/1.73 m2eGFR AA* >60 WBC 6.9 
RBC 3.59 HGB 11.50 g/dLHCT 35.30 %MCV 98.0 fLMCH 32.0 pgMCHC 32.60 g/dLRDW SD 46
 RDW CV 12.90 %MPV 9.90 fLPLT 311 NRBC# 0.00 NRBC% 0.0 %NEUT 64.90 %%LYMP 22.50 
%%MONO 7.20 %%EOS 5.10 %%BASO 0.30 %#NEUT 4.45 #LYMP 1.54 #MONO 0.49 #EOS 0.35 
#BASO 0.02 MANUAL DIFF NOT IND 

 

                          2011 12:00 AM         Mammogram -Women over 40 Ordered 

 

                          2011 10:25 AM        TRIGLYCERIDES 111.0 mg/dLCHOLESTEROL 195.0 mg/dLHDL 43.0 mg/dLTOT

 CHOL/HDL 4.5 LDL (CALC) 130.0 mg/dLWBC 5.3 RBC 3.76 HGB 12.0 g/dLHCT 37.80 %MCV
 101.0 fLMCH 31.90 pgMCHC 31.70 g/dLRDW SD 47 RDW CV 13.0 %MPV 9.70 fLPLT 259 
NRBC# 0.00 NRBC% 0.0 %NEUT 69.0 %%LYMP 17.60 %%MONO 8.30 %%EOS 4.70 %%BASO 0.40 
%#NEUT 3.63 #LYMP 0.93 #MONO 0.44 #EOS 0.25 #BASO 0.02 MANUAL DIFF NOT IND 
GLUCOSE 102.0 mg/dLSODIUM 146.0 mmol/LPOTASSIUM 4.20 mmol/LCHLORIDE 113.0 
mmol/LCO2 23.0 mmol/LBUN 15.0 mg/dLCREATININE 1.0 mg/dLSGOT/AST 12.0 
IU/LSGPT/ALT 17.0 IU/LALK PHOS 60.0 IU/LTOTAL PROTEIN 6.90 g/dLALBUMIN 4.20 
g/dLTOTAL BILI 0.40 mg/dLCALCIUM 9.70 mg/dLAGE 60 GFR NonAA 57 GFR AA 69 eGFR 57
 eGFR AA* >60 

 

                          2011 11:49 AM        Cholest Cry Stone Ql .0 %LDLc SerPl-mCnc 130.0 mg/dLHDLc

 SerPl-mCnc 43.0 mg/dLTrigl SerPl-mCnc 111.0 mg/dLGlucose SerPl-mCnc 102.0 mg/dL

 

                          2011 11:52 AM        Pap Smear Declined 

 

                          2011 11:28 AM        Lithium 2.080 mmol/LGLUCOSE 102.0 mg/dLSODIUM 135.0 mmol/LPOTASSIUM

 3.90 mmol/LCHLORIDE 106.0 mmol/LCO2 21.0 mmol/LBUN 12.0 mg/dLCREATININE 1.30 
mg/dLCALCIUM 10.70 mg/dLAGE 60 GFR NonAA 42 GFR AA 51 eGFR 42 eGFR AA* 51 

 

                          2011 8:58 AM          Lithium 0.690 mmol/L

 

                          2011 2:38 PM         VITAMIN B12 3483.0 pg/mL

 

                          2013 3:35 PM          WBC 5.1 RBC 3.73 HGB 11.70 g/dLHCT 36.40 %MCV 98.0 fLMCH 31.40

 pgMCHC 32.10 g/dLRDW SD 47 RDW CV 13.10 %MPV 9.80 fLPLT 224 NRBC# 0.00 NRBC% 
0.0 %NEUT 66.80 %%LYMP 19.10 %%MONO 9.0 %%EOS 4.90 %%BASO 0.20 %#NEUT 3.42 #LYMP
 0.98 #MONO 0.46 #EOS 0.25 #BASO 0.01 MANUAL DIFF NOT IND GLUCOSE 88.0 
mg/dLSODIUM 141.0 mmol/LPOTASSIUM 4.10 mmol/LCHLORIDE 110.0 mmol/LCO2 22.0 
mmol/LBUN 22.0 mg/dLCREATININE 1.10 mg/dLCALCIUM 9.80 mg/dLAGE 62 GFR NonAA 50 
GFR AA 61 eGFR 50 eGFR AA* 60 Lithium 0.760 mmol/L

 

                          2013 11:02 AM        TRIGLYCERIDES 106.0 mg/dLCHOLESTEROL 181.0 mg/dLHDL 46.0 mg/dLTOT

 CHOL/HDL 3.9 LDL (CALC) 114.0 mg/dLVITAMIN D 41.10 ng/mL

 

                          10/17/2014 10:10 AM       WBC 5.0 RBC 3.66 HGB 11.60 g/dLHCT 36.80 %.0 fLMCH 31.70

 pgMCHC 31.50 g/dLRDW SD 50 RDW CV 13.50 %MPV 10.10 fLPLT 209 NRBC# 0.00 NRBC% 
0.0 %NEUT 69.20 %%LYMP 21.0 %%MONO 6.40 %%EOS 3.20 %%BASO 0.20 %#NEUT 3.46 #LYMP
 1.05 #MONO 0.32 #EOS 0.16 #BASO 0.01 MANUAL DIFF NOT IND GLUCOSE 100.0 
mg/dLSODIUM 148.0 mmol/LPOTASSIUM 3.90 mmol/LCHLORIDE 114.0 mmol/LCO2 26.0 
mmol/LBUN 12.0 mg/dLCREATININE 1.20 mg/dLSGOT/AST 9.0 IU/LSGPT/ALT <6 IU/LALK 
PHOS 82.0 IU/LTOTAL PROTEIN 6.90 g/dLALBUMIN 4.0 g/dLTOTAL BILI 0.40 
mg/dLCALCIUM 10.50 mg/dLAGE 64 GFR NonAA 45 GFR AA 55 eGFR 45 eGFR AA* 55 
TRIGLYCERIDES 96.0 mg/dLCHOLESTEROL 155.0 mg/dLHDL 38.0 mg/dLTOT CHOL/HDL 4.1 
LDL (CALC) 98.0 mg/dLLithium 0.850 mmol/LCancer Antigen (CA) 125 8.30 U/mL

 

                          2015 10:25 AM        Lithium 0.790 mmol/LWBC 4.8 RBC 3.44 HGB 11.0 g/dLHCT 35.20 

%.0 fLMCH 32.0 pgMCHC 31.30 g/dLRDW SD 53 RDW CV 14.0 %MPV 9.30 fLPLT 210
 NRBC# 0.00 NRBC% 0.0 %NEUT 70.80 %%LYMP 17.20 %%MONO 8.10 %%EOS 3.50 %%BASO 
0.40 %#NEUT 3.41 #LYMP 0.83 #MONO 0.39 #EOS 0.17 #BASO 0.02 MANUAL DIFF NOT IND 
TRIGLYCERIDES 107.0 mg/dLCHOLESTEROL 174.0 mg/dLHDL 43.0 mg/dLTOT CHOL/HDL 4.0 
LDL (CALC) 110.0 mg/dLGLUCOSE 90.0 mg/dLSODIUM 145.0 mmol/LPOTASSIUM 3.80 
mmol/LCHLORIDE 115.0 mmol/LCO2 24.0 mmol/LBUN 17.0 mg/dLCREATININE 1.30 
mg/dLSGOT/AST 18.0 IU/LSGPT/ALT 17.0 IU/LALK PHOS 56.0 IU/LTOTAL PROTEIN 6.70 
g/dLALBUMIN 3.90 g/dLTOTAL BILI 0.40 mg/dLCALCIUM 9.80 mg/dLAGE 64 GFR NonAA 41 
GFR AA 50 eGFR 41 eGFR AA* 50 

 

                          2015 8:50 AM        WBC 5.8 RBC 3.29 HGB 10.70 g/dLHCT 34.0 %.0 fLMCH 32.50

 pgMCHC 31.50 g/dLRDW SD 52 RDW CV 13.60 %MPV 9.60 fLPLT 223 NRBC# 0.00 NRBC% 
0.0 %NEUT 69.60 %%LYMP 18.90 %%MONO 8.50 %%EOS 2.80 %%BASO 0.20 %#NEUT 4.03 
#LYMP 1.09 #MONO 0.49 #EOS 0.16 #BASO 0.01 MANUAL DIFF NOT IND Lithium 0.620 
mmol/LGLUCOSE 83.0 mg/dLSODIUM 139.0 mmol/LPOTASSIUM 3.90 mmol/LCHLORIDE 109.0 
mmol/LCO2 22.0 mmol/LBUN 19.0 mg/dLCREATININE 1.40 mg/dLSGOT/AST 19.0 
IU/LSGPT/ALT 21.0 IU/LALK PHOS 55.0 IU/LTOTAL PROTEIN 6.50 g/dLALBUMIN 3.90 
g/dLTOTAL BILI 0.50 mg/dLCALCIUM 9.60 mg/dLAGE 65 GFR NonAA 38 GFR AA 46 eGFR 38
 eGFR AA* 46 TRIGLYCERIDES 121.0 mg/dLCHOLESTEROL 192.0 mg/dLHDL 51.0 mg/dLTOT 
CHOL/HDL 3.8 .0 mg/dLFREE T4 0.79 TSH 1.210 uIU/mLHemoglobin A1c 5.40 
%Estim. Avg Glu (eAG) 108 

 

                          3/15/2016 8:08 AM         VITAMIN B12 696.0 pg/mL

 

                          3/23/2016 8:26 AM         WBC 7.0 RBC 3.61 HGB 11.80 g/dLHCT 37.70 %.0 fLMCH 32.70

 pgMCHC 31.30 g/dLRDW SD 49 RDW CV 12.50 %MPV 10.0 fLPLT 207 NRBC# 0.00 NRBC% 
0.0 %NEUT 73.60 %%LYMP 16.40 %%MONO 6.60 %%EOS 3.0 %%BASO 0.30 %#NEUT 5.15 #LYMP
 1.15 #MONO 0.46 #EOS 0.21 #BASO 0.02 MANUAL DIFF NOT IND Lithium 0.940 
mmol/LGLUCOSE 108.0 mg/dLSODIUM 143.0 mmol/LPOTASSIUM 4.30 mmol/LCHLORIDE 110.0 
mmol/LCO2 27.0 mmol/LBUN 16.0 mg/dLCREATININE 1.60 mg/dLSGOT/AST 13.0 
IU/LSGPT/ALT 7.0 IU/LALK PHOS 71.0 IU/LTOTAL PROTEIN 6.80 g/dLALBUMIN 4.0 
g/dLTOTAL BILI 0.20 mg/dLCALCIUM 10.40 mg/dLAGE 65 GFR NonAA 32 GFR AA 39 eGFR 
32 eGFR AA* 39 TRIGLYCERIDES 113.0 mg/dLCHOLESTEROL 169.0 mg/dLHDL 42.0 mg/dLTOT
 CHOL/HDL 4.0 LDL (CALC) 104.0 mg/dLFREE T4 0.86 TSH 2.20 uIU/mLHemoglobin A1c 
5.20 %Estim. Avg Glu (eAG) 103 

 

                          3/25/2016 9:17 AM         COLOR YELLOW APPEARANCE CLEAR SPEC GRAV 1.010 pH 7.0 PROTEIN 

NEGATIVE GLUCOSE NEGATIVE mg/dLKETONE NEGATIVE BILIRUBIN NEGATIVE BLOOD NEGATIVE
 NITRITE NEGATIVE [place] SCREEN SMALL MICRO IND? SEE BELOW WBC/HPF 0-5 RBC/HPF 
NEGATIVE CASTS/LPF NEGATIVE /LPFCRYSTALS NEGATIVE MUCOUS THRDS NEGATIVE BACTERIA
 NEGATIVE EPITH CELLS FEW SQUAMOUS /HPFTRICHOMONAS NEGATIVE YEAST NEGATIVE 

 

                          2016 6:00 AM        GLUCOSE 91.0 mg/dLSODIUM 143.0 mmol/LPOTASSIUM 3.60 mmol/LCHLORIDE

 112.0 mmol/LCO2 23.0 mmol/LBUN 22.0 mg/dLCREATININE 1.20 mg/dLSGOT/AST 15.0 
IU/LSGPT/ALT 12.0 IU/LALK PHOS 61.0 IU/LTOTAL PROTEIN 5.40 g/dLALBUMIN 3.10 
g/dLTOTAL BILI 0.40 mg/dLCALCIUM 8.40 mg/dLAGE 66 GFR NonAA 45 GFR AA 55 eGFR 45
 eGFR AA* 55 WBC 3.0 RBC 3.05 HGB 9.80 g/dLHCT 32.10 %.0 fLMCH 32.10 
pgMCHC 30.50 g/dLRDW SD 54 RDW CV 14.20 %MPV 10.10 fLPLT 170 NRBC# 0.00 NRBC% 
0.0 %NEUT 50.70 %%LYMP 32.60 %%MONO 10.50 %%EOS 5.90 %%BASO 0.30 %#NEUT 1.54 
#LYMP 0.99 #MONO 0.32 #EOS 0.18 #BASO 0.01 MANUAL DIFF NOT IND 

 

                          2016 2:09 PM        COLOR YELLOW APPEARANCE CLEAR SPEC GRAV 1.010 pH 5.0 PROTEIN

 30 GLUCOSE NEGATIVE mg/dLKETONE NEGATIVE BILIRUBIN NEGATIVE BLOOD LARGE NITRITE
 NEGATIVE [place] SCREEN MODERATE MICRO INDICATED? SEE BELOW WBC/HPF  RBC/HPF
 20-50 CASTS/LPF NEGATIVE /LPFCRYSTALS NEGATIVE MUCOUS THRDS NEGATIVE BACTERIA 
NEGATIVE EPITH CELLS FEW SQUAMOUS /HPFTRICHOMONAS NEGATIVE YEAST NEGATIVE CULT 
SET UP? YES 







History Of Immunizations







       Name    Date Admin    Mfg Name    Mfg Code    Trade Name    Lot#    Route    Inj    Vis Given    Vis

 Pub                                    CVX

 

        Influenza    2008    Not Entered    NE      Not Entered            Not Entered    Not Entered

                    1            999

 

        X       12/19/2008    [employer]    MSD     Pneumovax 23            Intramuscular    Not Entered

                    1            999

 

           Influenza    10/15/2010    [employer]    NOV        Fluvirin > 12 Years    

[id number]     Intramuscular    Left Deltoid    10/15/2010    2009    999

 

          X         3/23/2015    [facility]    [facility]       Prevnar 13    [id number]    Intramuscular

                Right Gluteous Medius    3/23/2015       2013       109







History of Past Illness







                    Name                Date of Onset       Comments

 

                    Peritoneal Neoplasm, Malignant                         

 

                    Hyperlipidemia                           

 

                    Bipolar disorder, unspecified                         

 

                    Artificial opening status; colostomy                         

 

                    B12 deficiency                           

 

                    Anemia, Pernicious                         

 

                    Arthritis unspecified                         

 

                    cervical cancer                          

 

                    Artificial opening status; colostomy    2010  1:10PM     

 

                    Bipolar disorder, unspecified    2010  1:10PM     

 

                    Hyperlipidemia      2010  1:10PM     

 

                    Anemia, Pernicious    2010  1:10PM     

 

                    Postoperative Follow-Up    2010  1:55PM     

 

                    Postoperative Follow-Up    Mar  8 2010 10:57AM     

 

                    Artificial opening status; colostomy    Mar  8 2010  1:19PM     

 

                    Peritoneal Neoplasm, Malignant    Mar  8 2010  1:19PM     

 

                    Artificial opening status; colostomy    2010  1:40PM     

 

                    Hyperlipidemia      2010  1:40PM     

 

                    Anemia, Pernicious    2010  1:40PM     

 

                    Peritoneal Neoplasm, Malignant    2010  1:40PM     

 

                    Arthritis unspecified    2010  1:40PM     

 

                    Anemia of Chronic Illness    2010  1:40PM     

 

                    Tinea corporis      2010  3:17PM     

 

                    Bipolar disorder, unspecified    2010  1:33PM     

 

                    Hyperlipidemia      2010  1:33PM     

 

                    Anemia, Pernicious    2010  1:33PM     

 

                    Peritoneal Neoplasm, Malignant    2010  1:33PM     

 

                    B12 deficiency      2010  1:33PM     

 

                    Ethmoidal Sinusitis, Acute    Sep 21 2010  3:53PM     

 

                    Wheezing            Sep 21 2010  3:53PM     

 

                    Flu                 Oct 15 2010  1:40PM     

 

                    Bipolar disorder, unspecified    Oct 15 2010  1:42PM     

 

                    Hyperlipidemia      Oct 15 2010  1:42PM     

 

                    Anemia, Pernicious    Oct 15 2010  1:42PM     

 

                    Peritoneal Neoplasm, Malignant    Oct 15 2010  1:42PM     

 

                    Bipolar disorder, unspecified    2011 12:01PM     

 

                    Hyperlipidemia      2011 12:01PM     

 

                    Anemia, Pernicious    2011 12:01PM     

 

                    Peritoneal Neoplasm, Malignant    2011 12:01PM     

 

                    Bipolar disorder, unspecified    Apr 15 2011 10:55AM     

 

                    Major Depression    2011 10:11AM     

 

                    Bipolar Disorder    2011 10:11AM     

 

                    Cancer              May 10 2011  4:16PM     

 

                    Major Depression    May 10 2011  3:16PM     

 

                    Bipolar Disorder    May 10 2011  3:16PM     

 

                    Hypercalcemia       May 23 2011  2:47PM     

 

                    Bipolar disorder, unspecified    May 23 2011  2:47PM     

 

                    Colon Cancer, Personal History    May 23 2011  2:47PM     

 

                    Bipolar Disorder    May 31 2011  4:39PM     

 

                    Depressive Disorder    2011 10:01AM     

 

                    Vitamin B12 deficiency    2011 10:01AM     

 

                    Vitamin D Deficiency    2011  5:07PM     

 

                    Anemia, Vitamin B12 Deficiency    2011  5:07PM     

 

                    B12 deficiency      2011  3:56PM     

 

                    Routine gynecological examination    Aug  4 2011  9:08AM     

 

                    Screening Examination for Breast Cancer    Aug  4 2011  9:08AM     

 

                    Tinea Corporis      Aug  4 2011  9:08AM     

 

                    Depressive Disorder    Sep 23 2011  8:47AM     

 

                    Contact Dermatitis    Sep 23 2011  8:47AM     

 

                    Anemia, Pernicious    Sep 23 2011  8:47AM     

 

                    B12 deficiency      Sep 23 2011  8:47AM     

 

                    B12 deficiency      Sep 27 2011  2:58PM     

 

                    B12 deficiency      Oct 20 2011  2:34PM     

 

                    Flu                 Dec  9 2011  3:16PM     

 

                    B12 deficiency      Dec  9 2011  3:17PM     

 

                    B12 deficiency      2012  4:52PM     

 

                    B12 deficiency      2012 11:10AM     

 

                    B12 deficiency      2012  3:37PM     

 

                    B12 deficiency      May  3 2012  4:10PM     

 

                    B12 deficiency      2012  2:54PM     

 

                    B12 deficiency      2012 11:23AM     

 

                    B12 deficiency      Aug  9 2012  2:08PM     

 

                    B12 deficiency      Sep  6 2012  4:36PM     

 

                    B12 deficiency      Oct 16 2012 10:23AM     

 

                    Flu                 b  2013  3:11PM     

 

                    Bipolar disorder, unspecified    2013  2:48PM     

 

                    Anemia, Pernicious    Fe2013  2:48PM     

 

                    B12 deficiency      2013  2:48PM     

 

                    Extrapyramidal abnormal movement disorder    2013  2:48PM     

 

                    B12 deficiency      Apr  3 2013 12:03PM     

 

                    Bipolar disorder, unspecified    May  7 2013  1:31PM     

 

                    Anemia, Pernicious    May  7 2013  1:31PM     

 

                    B12 deficiency      May  7 2013  1:31PM     

 

                    Extrapyramidal abnormal movement disorder    May  7 2013  1:31PM     

 

                    B12 deficiency      2013  3:42PM     

 

                    B12 deficiency      2013  1:31PM     

 

                    Hyperlipidemia      Aug  7 2013 10:37AM     

 

                    Vitamin D Deficiency    Aug  7 2013 10:37AM     

 

                    Bipolar disorder, unspecified    Aug  7 2013 10:37AM     

 

                    Anemia, Pernicious    Aug  7 2013 10:37AM     

 

                    B12 deficiency      Aug  7 2013 10:37AM     

 

                    B12 deficiency      Sep 25 2013 11:15AM     

 

                    B12 deficiency      Dec 11 2013  3:16PM     

 

                    B12 deficiency      Mar  6 2014  1:48PM     

 

                    B12 deficiency      May 21 2014  3:17PM     

 

                    Screening Examination for Breast Cancer    2014  3:23PM     

 

                    Periumbilical abdominal pain    2014  3:23PM     

 

                    B12 deficiency      Jul 10 2014  2:52PM     

 

                    Anemia, Vitamin B12 Deficiency    Aug 13 2014  4:50PM     

 

                    Bipolar disorder    Oct 16 2014 11:13AM     

 

                    Hyperlipidemia      Oct 16 2014 11:13AM     

 

                    Anemia, Pernicious    Oct 16 2014 11:13AM     

 

                    Peritoneal Neoplasm, Malignant    Oct 16 2014 11:13AM     

 

                    Screening breast examination    Oct 16 2014 11:13AM     

 

                    Weight loss         Oct 16 2014 11:13AM     

 

                    Anemia, Pernicious    Mar 23 2015  2:57PM     

 

                    B12 deficiency      Mar 23 2015  2:57PM     

 

                    Need for Prevnar vaccine    Mar 23 2015  2:57PM     

 

                    Bipolar disorder    Mar 23 2015  2:57PM     

 

                    Hyperlipidemia      Mar 23 2015  2:57PM     

 

                    Anemia, Pernicious    Mar 23 2015  2:57PM     

 

                    Peritoneal Neoplasm, Malignant    Mar 23 2015  2:57PM     

 

                    B12 deficiency      May  4 2015  4:48PM     

 

                    Hyperlipidemia      May 13 2015  9:56AM     

 

                    Anemia              May 13 2015  9:56AM     

 

                    Bipolar disorder    May 13 2015  9:56AM     

 

                    Bipolar disorder    May 14 2015  3:27PM     

 

                    Hyperlipidemia      May 14 2015  3:27PM     

 

                    Anemia, Pernicious    May 14 2015  3:27PM     

 

                    Peritoneal Neoplasm, Malignant    May 14 2015  3:27PM     

 

                    B12 deficiency      2015  2:20PM     

 

                    B12 deficiency      2015 11:34AM     

 

                    B12 deficiency      Aug 18 2015  9:06AM     

 

                    Tinea Corporis      Sep 18 2015  8:54AM     

 

                    B12 deficiency      Sep 18 2015  8:54AM     

 

                    B12 deficiency      2015 10:28AM     

 

                    Herpes zoster without complication    Dec  3 2015  9:52AM     

 

                    B12 deficiency      Dec 23 2015 11:21AM     

 

                    B12 deficiency      2016  4:51PM     

 

                    Vitamin B 12 deficiency    Mar 14 2016  5:35PM     

 

                    B12 deficiency      Mar 15 2016 12:14PM     

 

                    B12 deficiency      May  5 2016 11:30AM     

 

                    Edema               May  5 2016 11:30AM     

 

                    Dermatitis          May  5 2016 11:30AM     

 

                    Edema               May 17 2016  8:38AM     

 

                    Shortness of breath    May 17 2016  8:38AM     

 

                    Bilateral edema of lower extremity    2016  2:06PM     

 

                    B12 deficiency      2016  2:06PM     

 

                    B12 deficiency      2016 11:50AM     

 

                    B12 deficiency      2016 11:20AM     

 

                    Diarrhea            Aug  2 2016  3:13PM     

 

                    B12 deficiency      Aug 24 2016 11:10AM     

 

                    Encounter for screening mammogram for breast cancer    Aug 24 2016 11:44AM     

 

                    B12 deficiency      Sep 28 2016  2:35PM     

 

                    B12 deficiency      Dec 15 2016  2:02PM     

 

                    Dysuria             Dec 29 2016 12:14PM     

 

                    Hematuria           Fidencio  3 2017  1:33PM     







Payers







           Insurance Name    Company Name    Plan Name    Plan Number    Policy Number    Policy Group

 Number                                 Start Date

 

                    Medicare Part A    Medicare RHC              [id number]              N/A

 

                          [employer]    [employer]                 [id number]

                                                    

 

                    Medicare Part A    Medicare - Lab/Xray              [id number]              2006

 

                    Medicare Part B    [employer]ic[employer] [place]              [id number]              2006

 

                          [employer] [facility] [employer] [facility]    [employer]                 50 percent

                                                    2009

 

                    [facility]    [employer]              [id number]              N/A

 

                    Medicare Part A    Medicare Part A              [id number]              N/A

 

                    Medicare Part A    Medicare Part A              [id number]              2006









History of Encounters







                    Visit Date          Visit Type          Provider

 

                    12/15/2016          Nurse visit         Bhupinder Louise DO

 

                    2016           Nurse visit         Bret GREEN

 

                    2016           Nurse visit         Bhupinder Louise DO

 

                    2016            Office visit        Bhupinder Aspen DO

 

                    2016           Nurse visit         Bhupinder Aspen DO

 

                    2016           Office visit        Bret Forte APRN

 

                    2016            Office visit        Bhupinder Aspen DO

 

                    3/15/2016           Nurse visit         Bhupinder Aspen DO

 

                    2016            Nurse visit         Bhupinder Aspen DO

 

                    2015          Nurse visit         Bhupinder Aspen DO

 

                    12/3/2015           Office visit        Bhupinder Aspen DO

 

                    2015          Nurse visit         Bhupinder Aspen DO

 

                    2015           Office visit        Bhupinder Aspen DO

 

                    2015           Nurse visit         Bhupinder Aspen DO

 

                    2015            Nurse visit         Bhupinder Aspen DO

 

                    2015            Nurse visit         Bhupinder Aspen DO

 

                    2015           Office visit        Bhupinder Aspen DO

 

                    2015            Nurse visit         Bhupinder Aspen DO

 

                    3/23/2015           Office visit        Bhupinder Aspen DO

 

                    10/16/2014          Office visit        Bhupinder Aspen DO

 

                    2014           Nurse visit         Radha Ontiveros APRN

 

                    7/10/2014           Nurse visit         Bhupinder Aspen DO

 

                    2014           Office visit        Bhupinder Aspen DO

 

                    2014           Nurse visit         Bhupinder Aspen DO

 

                    3/6/2014            Nurse visit         Bhupinder Aspen DO

 

                    2014            Yasmine Lopez MD

 

                    2013          Nurse visit         Bhupinder Aspen DO

 

                    2013           Nurse visit         Bhupinder Aspen DO

 

                    2013            Office visit        Bhupinder Aspen DO

 

                    2013            Nurse visit         Bhupinder Aspen DO

 

                    2013            Nurse visit         Bhupinder Aspen DO

 

                    2013            Office visit        Bhupinder Aspen DO

 

                    4/3/2013            Nurse visit         Bhupinder Aspen DO

 

                    2013            Office visit        Bhupinder Aspen DO

 

                    10/16/2012          Nurse visit         Bhupinder Aspen DO

 

                    2012            Nurse visit         Bhupinder Aspen DO

 

                    2012            Voided              Bhupinder Aspen DO

 

                    2012            Nurse visit         Bhupinder Aspen DO

 

                    2012            Nurse visit         Bhupinder Aspen DO

 

                    2012           Nurse visit         Bhupinder Aspen DO

 

                    5/3/2012            Nurse visit         Bhupinder Aspen DO

 

                    2012           Nurse visit         Bhupinder Aspen DO

 

                    2012           Nurse visit         Bhupinder Aspen DO

 

                    2012           Nurse visit         Bhupinder Aspen DO

 

                    2011           Nurse visit         Bhupinder Aspen DO

 

                    10/20/2011          Nurse visit         Bhupinder Aspen DO

 

                    2011           Office visit        Bhupinder Aspen DO

 

                    2011           Nurse visit         Radha Weston GREEN

 

                    2011            Office visit        Bhupinder Aspen DO

 

                    2011           Nurse visit         Bhupinder Aspen DO

 

                    2011            Office visit        Bhupinder Aspen DO

 

                    2011           Office visit        Bhupinder Nealte DO

 

                    5/10/2011           Office visit        Bhupinder Nealte DO

 

                    2011           Office visit        Bhupinder Louise DO

 

                    4/15/2011           Office visit        Devin Angel DO

 

                    2011           Office visit        Devin Angel DO

 

                    10/15/2010          Office visit        Devin Angel DO

 

                    2010           Office visit        Devin Angel DO

 

                    2010            Office visit        Devin Angel DO

 

                    2010           Office visit        Devin Angel DO

 

                    2010            Office visit        Devin Angel DO

 

                    3/8/2010            Office visit        Devin Masterson MD

 

                    2010            Surgery             Devin Masterson MD

 

                    2010            Office visit        Devin Angel DO

 

                    2010           Surgery             Devin Masterson MD

 

                    2010           Hospital            Devin Masterson MD

 

                    2010           [facility]            Devin Masterson MD

 

                    10/22/2009          Office visit        Devin Angel DO

## 2019-06-26 NOTE — XMS REPORT
MU2 Ambulatory Summary

                             Created on: 12/15/2016



Pauline Gan

External Reference #: [id number]

: 1950

Sex: Female



Demographics







                          Address                   [address]

[place]  [id number]

 

                          Home Phone                [phone]

 

                          Preferred Language        English

 

                          Marital Status            Legally 

 

                          Christianity Affiliation     Unknown

 

                          Race                      White

 

                          Ethnic Group              Not  or 





Author







                          Bhupinder Aguilar

 

                          [facility]

 

                          Address                   [address]

[place]  [id number]



 

                          Phone                     [phone]







Care Team Providers







                    Care Team Member Name    Role                Phone

 

                    Bhupinder Louise    PCP                 Unavailable

 

                    Bhupinder Louise    PreferredProvider    Unavailable







Allergies and Adverse Reactions







                    Name                Reaction            Notes

 

                    NO KNOWN DRUG ALLERGIES                         







Plan of Treatment







             Planned Activity    Comments     Planned Date    Planned Time    Plan/Goal

 

             Injection, Subcutaneous/IM                 2016    12:00 AM      

 

             Injection, Subcutaneous/IM                 2016    12:00 AM      

 

             Mammography; bilateral                 2016    12:00 AM      

 

             Injection, Subcutaneous/IM                 2016    12:00 AM      

 

             CBC with Auto                 2013     12:00 AM      

 

             Lithium                   2013     12:00 AM      

 

             Basic metabolic panel                 2013     12:00 AM      

 

             Vitamin B12 (cyanocobalamin)                 2013     12:00 AM      

 

             Folic acid, serum                 2013     12:00 AM      

 

             Injection,Subcutaneous/Intramuscul                 2013    12:00 AM      







Medications







                                        Active 

 

             Name         Start Date    Estimated Completion Date    SIG          Comments

 

                Latuda 20 mg oral tablet                                    take 1 tablet (20 mg) by oral route once daily with

 food (at least 350 calories)            

 

             pravastatin 40 mg oral tablet    3/30/2015                 TAKE 1 TABLET BY MOUTH DAILY     

 

                Namenda XR 28 mg oral capsule,sprinkle,ER 24hr    2015                       take 1 capsule (28

 mg) by oral route once daily            

 

                Namenda XR 28 mg oral capsule,sprinkle,ER 24hr    2016                       take 1 capsule (28

 mg) by oral route once daily            

 

                triamcinolone acetonide 0.1 % topical cream    2016                        apply a thin layer to 

the affected area(s) by topical route 2 times per day     

 

                potassium chloride 10 mEq oral tablet extended release    2016                       take 1 tablet

 (10 meq) by oral route once daily       

 

             pravastatin 40 mg oral tablet    2016                 TAKE 1 TABLET BY MOUTH DAILY     

 

                Vitamin B-12 1,000 mcg/mL injection solution    2016                       inject 1 milliliter 

(1,000 mcg) by intramuscular route once a month     

 

                potassium chloride 10 mEq oral tablet extended release    2016                      take 1 tablet

 (10 meq) by oral route once daily       

 

                Namenda XR 28 mg oral capsule,sprinkle,ER 24hr    2016                      TAKE 1 CAPSULE BY

 MOUTH EVERY DAY                         

 

                furosemide 40 mg oral tablet    2016                      take 1 tablet (40 mg) by oral route

 once daily                              









                                         

 

             Name         Start Date    Expiration Date    SIG          Comments

 

             Reglan 10mg    3/29/2010    2010    one ac and hs     

 

                Keflex 500 mg oral capsule    2010       10/1/2010       take 1 capsule (500 mg) by oral

 route every 6 hours for 10 days         

 

                Bactrim -160 mg oral tablet    2011       take 1 tablet by oral route

 every 12 hours for 7 days               

 

                triamcinolone acetonide 0.1 % topical cream    2011      apply a thin

 layer to the affected area(s) by topical route 2 times per day     

 

                sertraline 100 mg oral tablet    4/10/2012       5/10/2012       take 1.5 tablets by oral route

 daily for 30 days                       

 

                ergocalciferol (vitamin D2) 50,000 unit oral capsule    4/15/2013       2013       TAKE

 ONE CAPSULE BY MOUTH ONCE A WEEK        

 

                CYANOCOBALAM 1000MCGINJ 1000 milliliter    2013       INJECT 1ML INTRAMUSCULAR

 ONCE A MONTH                            

 

                pravastatin 40 mg oral tablet    3/25/2014       3/20/2015       TAKE ONE TABLET BY MOUTH EVERY

 DAY                                     

 

                          Zostavax (PF) 19,400 unit/0.65 mL subcutaneous suspension for reconstitution    3/23/2015

                    3/24/2015           inject 0.65 milliliter by subcutaneous route once     

 

                famciclovir 500 mg oral tablet    12/3/2015       12/10/2015      take 1 tablet (500 mg) by

 oral route every 8 hours for 7 days     

 

                furosemide 40 mg oral tablet    2016      take 1 tablet (40 mg) by oral

 route once daily                        

 

                Cipro 500 mg oral tablet    2016       take 1 tablet (500 mg) by oral route

 2 times per day for 5 days              









                                        Discontinued 

 

             Name         Start Date    Discontinued Date    SIG          Comments

 

                Tylenol 325 mg oral tablet                    2013        take 1 - 2 tablets (325 -650 mg) by oral

 route every 4-6 hours as needed         

 

                Calcium 600 + D(3) 600 mg(1,500mg) -400 unit oral tablet                    2011       take 1 tablet

 by oral route 2 times a day            no longer taking

 

                Vitamin B-12 1,000 mcg oral tablet extended release    2010       take 1

 tablet by oral route daily             no longer taking

 

                Antifungal (clotrimazole) 1 % topical cream    2010       apply to the 

affected and surrounding areas of skin by topical route 2 times per day morning 
and evening                              

 

                sertraline 100 mg oral tablet    5/10/2011       2011       take 2 tablets (200 mg) by 

oral route once daily                   discontinued by Dr. Serrano

 

                mirtazapine 15 mg oral tablet                    2011        take 1 tablet (15 mg) by oral route 

once daily before bedtime               Dr. Serrano

 

                mirtazapine 15 mg oral tablet                    2011        take 1 tablet (15 mg) by oral route 

once daily before bedtime               dc'd by Dr. Serrano

 

                Pristiq 50 mg oral tablet extended release 24 hr                    2013        take 1 tablet (50

 mg) by oral route once daily           Dr. Serrano

 

                Pristiq 50 mg oral tablet extended release 24 hr                    2013        take 1 tablet (50

 mg) by oral route once daily           dose updated

 

                Vitamin B-12 1,000 mcg/mL injection solution    2011        inject 1 milliliter

 (1,000 mcg) by intramuscular route once a month    on list already

 

                    syringe with needle 1 mL 25 gauge x 1" miscellaneous syringe    2011

                          use for injection once a month     

 

                clotrimazole 1 % topical cream    2011        apply to the affected and surrounding

 areas of skin by topical route 2 times per day in the morning and evening     

 

                Vitamin D2 50,000 unit oral capsule    2011        take 1 capsule (50,000

 unit) by oral route once weekly        generic on list

 

                Pravachol 40 mg oral tablet    2012        take 1 tablet (40 mg) by oral 

route once daily for 90 days            generic on list

 

                lithium carbonate 300 mg oral capsule    2012        take 1 capsule by oral

 route daily                            dose updated

 

                Pristiq 100 mg oral tablet extended release 24 hr                    4/10/2012       take 1 and 1/2 

tablet (150 mg) by oral route once daily    Mental Health provider

 

                Pristiq 100 mg oral tablet extended release 24 hr                    4/10/2012       take 1 and 1/2 

tablet (150 mg) by oral route once daily    Discontinued by Dr Efrain [facility] at [facility]

 

                hydroxyzine HCl 50 mg oral tablet    10/16/2014      2015       take 1 tablet (50 mg) 

by oral route at bedtime                 

 

                lithium carbonate 300 mg oral capsule    2015       take 1 capsule (300

 mg) by oral route 2 for 30 days         

 

                fluconazole 100 mg oral tablet    2015       12/3/2015       take 1 tablet (100 mg) by 

oral route once a week                   

 

                ketoconazole 2 % topical cream    2015       12/3/2015       apply to the affected area(s)

 by topical route 2 times per day        

 

                prednisone 10 mg oral tablet    12/3/2015       2016        take 2 tablets (20 mg) by oral

 route once daily for 4 days 1 tablet daily for 4 days 0.5 tablet daily for 4 
days                                     







Problem List







                    Description         Status              Onset

 

                    Artificial opening status; colostomy    Active               

 

                    Bipolar disorder, unspecified    Active               

 

                    Hyperlipidemia      Active               

 

                    Peritoneal Neoplasm, Malignant    Active               

 

                    Anemia, Pernicious    Active               

 

                    Arthritis unspecified    Active               

 

                    B12 deficiency      Active               







Vital Signs







      Date    Time    BP-Sys(mm[Hg]    BP-Lynn(mm[Hg])    HR(bpm)    RR(rpm)    Temp    WT    HT    HC    BMI

                    BSA                 BMI Percentile      O2 Sat(%)

 

       2016    3:11:00 PM    134 mmHg    76 mmHg    80 bpm    20 rpm    98 F    163 lbs    69 in     

                24.07 kg/m2     1.90 m2                         98 %

 

        2016    2:04:00 PM    142 mmHg    86 mmHg    68 bpm    16 rpm    98.5 F    166 lbs    63 in

                          29.4053 kg/m    1.8295 m                 100 %

 

        2016    11:27:00 AM    148 mmHg    78 mmHg    90 bpm    20 rpm    98.2 F    153 lbs    69 in

                          22.59 kg/m2    1.84 m2                   96 %

 

        12/3/2015    9:50:00 AM    132 mmHg    70 mmHg    62 bpm    16 rpm    97.9 F    145 lbs    69 in

                          21.4125 kg/m    1.7894 m                 100 %

 

        2015    8:52:00 AM    132 mmHg    68 mmHg    52 bpm    20 rpm    97.8 F    141 lbs    69 in

                          20.82 kg/m2    1.76 m2                   100 %

 

        2015    3:25:00 PM    120 mmHg    62 mmHg    72 bpm    16 rpm    98.1 F    136 lbs    69 in

                          20.0835 kg/m    1.733 m                 98 %

 

       3/23/2015    2:55:00 PM    130 mmHg    76 mmHg    68 bpm    18 rpm    97 F    140 lbs    69 in    

                20.67 kg/m2     1.76 m2                         98 %

 

        10/16/2014    11:11:00 AM    120 mmHg    66 mmHg    77 bpm    20 rpm    98 F    130 lbs    69 in

                          19.1974 kg/m    1.6943 m                 100 %

 

        2014    3:21:00 PM    130 mmHg    66 mmHg    63 bpm    18 rpm    97.2 F    160 lbs    69 in

                          23.63 kg/m2    1.88 m2                   99 %

 

        2013    10:35:00 AM    132 mmHg    70 mmHg    66 bpm    20 rpm    98.1 F    157 lbs    69 in

                          23.1846 kg/m    1.862 m                  

 

        2013    1:29:00 PM    132 mmHg    70 mmHg    76 bpm    18 rpm    98.2 F    166 lbs    69 in 

                          24.51 kg/m2    1.91 m2                    

 

       2013    2:46:00 PM    128 mmHg    70 mmHg    76 bpm    16 rpm    98 F    160 lbs    69 in     

                23.6276 kg/m    1.8797 m                       

 

        2011    8:49:00 AM    128 mmHg    78 mmHg    70 bpm    18 rpm    97.9 F    164 lbs    69 in

                          24.22 kg/m2    1.90 m2                    

 

     2011    1:31:00 PM    132 mmHg    68 mmHg    84 bpm         97 F    167 lbs                        

                                         

 

        2011    9:09:00 AM    128 mmHg    70 mmHg    72 bpm    18 rpm    98.2 F    163 lbs    64 in 

                          27.98 kg/m2    1.83 m2                    

 

       2011    10:01:00 AM    132 mmHg    70 mmHg    72 bpm    18 rpm    98.2 F    154 lbs             

                                                                 

 

       2011    2:47:00 PM    128 mmHg    70 mmHg    72 bpm    18 rpm    97.8 F    156 lbs             

                                                                 

 

       5/10/2011    3:16:00 PM    144 mmHg    80 mmHg    72 bpm    18 rpm    98.2 F    158 lbs             

                                                                 

 

        2011    10:11:00 AM    132 mmHg    70 mmHg    70 bpm    18 rpm    98.2 F    168 lbs    69 in

                          24.81 kg/m2    1.93 m2                    

 

        4/15/2011    10:52:00 AM    110 mmHg    60 mmHg    75 bpm    16 rpm    97.5 F    172.375 lbs    

69 in                     25.4551 kg/m    1.951 m                 100 %

 

        2011    11:43:00 AM    120 mmHg    82 mmHg    75 bpm    16 rpm    97.2 F    178.5 lbs    69

 in                       26.36 kg/m2    1.99 m2                   100 %

 

        10/15/2010    1:32:00 PM    120 mmHg    70 mmHg    80 bpm    18 rpm    96.6 F    177 lbs    69 in

                          26.1381 kg/m    1.977 m                 100 %

 

        2010    3:50:00 PM    168 mmHg    100 mmHg    82 bpm    18 rpm    97.8 F    177.5 lbs    69

 in                       26.21 kg/m2    1.98 m2                   97 %

 

        2010    1:21:00 PM    140 mmHg    80 mmHg    59 bpm    16 rpm    97.6 F    173.25 lbs    69 

in                        25.5843 kg/m    1.956 m                 100 %

 

        2010    3:02:00 PM    140 mmHg    80 mmHg    61 bpm    16 rpm    97.6 F    173.125 lbs    69

 in                       25.57 kg/m2    1.96 m2                   99 %

 

        2010    1:23:00 PM    130 mmHg    80 mmHg    66 bpm    16 rpm    96.8 F    173 lbs    69 in 

                          25.5474 kg/m    1.9546 m                 100 %

 

        2010    12:58:00 PM    130 mmHg    88 mmHg    75 bpm    16 rpm    98.4 F    172.25 lbs    69

 in                       25.44 kg/m2    1.95 m2                   100 %







Social History







                    Name                Description         Comments

 

                    denies alcohol use                         

 

                    denies smoking                           

 

                    Denies illicit substance abuse                         

 

                    retired                                 direct care

 

                    Single                                   

 

                    Exercises regularly                         

 

                    Attended some college                         

 

                    Tobacco             Never smoker         







History of Procedures







                    Date Ordered        Description         Order Status

 

                    2010 12:00 AM    COMPREHEN METABOLIC PANEL    Reviewed

 

                    2010 12:00 AM    COMPLETE CBC W/AUTO DIFF WBC    Reviewed

 

                    2010 12:00 AM    LIPID PANEL         Reviewed

 

                          2015 12:00 AM        B12 Injection, Up to 1000 Mcg NDC#5663-5570-90 [facility] 

                                        Reviewed

 

                    2011 12:00 AM    MAMMOGRAM SCREENING    Reviewed

 

                    2011 12:00 AM    CYTOPATH C/V THIN LAYER    Reviewed

 

                    2011 12:00 AM    B12 Injection 1 cc NDC#[address]-66    Reviewed

 

                    2015 12:00 AM    THER/PROPH/DIAG INJ SC/IM    Reviewed

 

                    2015 12:00 AM    B12 Injection, Up to 1000 Mcg NDC#0907-0559-72    Reviewed

 

                    2011 12:00 AM    THER/PROPH/DIAG INJ SC/IM    Reviewed

 

                    2011 12:00 AM    B12 Injection([place]) Ndc#9627-2420-76-    Reviewed

 

                    2015 12:00 AM    THER/PROPH/DIAG INJ SC/IM    Reviewed

 

                    2015 12:00 AM    B12 Injection, Up to 1000 Mcg NDC#0707-5196-93    Reviewed

 

                    10/20/2011 12:00 AM    THER/PROPH/DIAG INJ SC/IM    Reviewed

 

                    10/20/2011 12:00 AM    B12 Injection([place]) Ndc#3747-7320-58-    Reviewed

 

                    2016 12:00 AM    THER/PROPH/DIAG INJ SC/IM    Reviewed

 

                    2016 12:00 AM    B12 Injection, Up to 1000 Mcg NDC#8171-6339-60    Reviewed

 

                    3/14/2016 12:00 AM    VITAMIN B-12        Reviewed

 

                    3/15/2016 12:00 AM    THER/PROPH/DIAG INJ SC/IM    Reviewed

 

                    3/15/2016 12:00 AM    B12 Injection, Up to 1000 Mcg NDC#9985-6664-57    Reviewed

 

                    2011 12:00 AM    ***Immunization administration, Medicare flu    Reviewed

 

                    2011 12:00 AM    Fluzone ** MEDICARE Only **    Reviewed

 

                    2011 12:00 AM    THER/PROPH/DIAG INJ SC/IM    Reviewed

 

                    2011 12:00 AM    B12 Injection ([employer] [facility]) Ndc#2031-3155-34    Reviewed

 

                    2016 12:00 AM    B12 Injection, Up to 1000 Mcg NDC#4940-4199-38 [facility] Medicare    

Reviewed

 

                    2016 12:00 AM    TTE W/DOPPLER COMPLETE    Reviewed

 

                    2016 12:00 AM    EXTREMITY STUDY     Returned

 

                          2016 12:00 AM        B12 Injection, Up to 1000 Mcg NDC#8604-2566-03 [facility] Medicare 

                                        Reviewed

 

                    2016 12:00 AM    THER/PROPH/DIAG INJ SC/IM    Reviewed

 

                    2012 12:00 AM    THER/PROPH/DIAG INJ SC/IM    Reviewed

 

                    2012 12:00 AM    B12 Injection ([employer]) Ndc#6118-8483-29    Reviewed

 

                    2016 12:00 AM    THER/PROPH/DIAG INJ SC/IM    Reviewed

 

                    2016 12:00 AM    B12 Injection, Up to 1000 Mcg NDC#0086-9004-23    Reviewed

 

                    2012 12:00 AM    THER/PROPH/DIAG INJ SC/IM    Reviewed

 

                    2012 12:00 AM    B12 Injection([place]) Ndc#8075-9364-66-    Reviewed

 

                    12/15/2016 12:00 AM    B12 Injection, Up to 1000 Mcg NDC#6554-4439-26    Reviewed

 

                    12/15/2016 12:00 AM    THER/PROPH/DIAG INJ SC/IM    Reviewed

 

                    5/3/2012 12:00 AM    THER/PROPH/DIAG INJ SC/IM    Reviewed

 

                    5/3/2012 12:00 AM    B12 Injection([place]) Ndc#5104-7841-20-    Reviewed

 

                    2012 12:00 AM    IMMUNOTHERAPY INJECTIONS    Reviewed

 

                    2012 12:00 AM    B12 Injection([place]) Ndc#9877-1204-78-    Reviewed

 

                    2012 12:00 AM    THER/PROPH/DIAG INJ SC/IM    Reviewed

 

                    2012 12:00 AM    B12 Injection, Up to 1000 Mcg NDC#9458-9664-72    Reviewed

 

                    2012 12:00 AM    THER/PROPH/DIAG INJ SC/IM    Reviewed

 

                    2012 12:00 AM    B12 Injection, Up to 1000 Mcg NDC#3502-8446-20    Reviewed

 

                    2012 12:00 AM    THER/PROPH/DIAG INJ SC/IM    Reviewed

 

                    2012 12:00 AM    B12 Injection, Up to 1000 Mcg NDC#1646-1031-67    Reviewed

 

                    10/16/2012 12:00 AM    THER/PROPH/DIAG INJ SC/IM    Reviewed

 

                    10/16/2012 12:00 AM    B12 Injection, Up to 1000 Mcg NDC#5751-6584-50    Reviewed

 

                    2010 12:00 AM    COMPREHEN METABOLIC PANEL    Reviewed

 

                    2010 12:00 AM    COMPLETE CBC W/AUTO DIFF WBC    Reviewed

 

                    2010 12:00 AM    LIPID PANEL         Reviewed

 

                    2013 12:00 AM    Flu Injection 3 Years And Above NDC# [address]-11  [facility]    Reviewed



 

                    2013 12:00 AM    COMPLETE CBC W/AUTO DIFF WBC    Reviewed

 

                    2013 12:00 AM    ASSAY OF LITHIUM    Reviewed

 

                    2013 12:00 AM    METABOLIC PANEL TOTAL CA    Reviewed

 

                    4/3/2013 12:00 AM    THER/PROPH/DIAG INJ SC/IM    Reviewed

 

                    4/3/2013 12:00 AM    B12 Injection, Up to 1000 Mcg NDC#0629-6241-60    Reviewed

 

                    2013 12:00 AM    THER/PROPH/DIAG INJ SC/IM    Reviewed

 

                    2013 12:00 AM    B12 Injection, Up to 1000 Mcg NDC#6614-9069-41    Reviewed

 

                    2013 12:00 AM    THER/PROPH/DIAG INJ SC/IM    Reviewed

 

                    2013 12:00 AM    B12 Injection, Up to 1000 Mcg NDC#3592-8847-57    Reviewed

 

                    2013 12:00 AM    LIPID PANEL         Reviewed

 

                    2013 12:00 AM    VITAMIN D 25 HYDROXY    Reviewed

 

                    2013 12:00 AM    THER/PROPH/DIAG INJ SC/IM    Reviewed

 

                    3/6/2014 12:00 AM    THER/PROPH/DIAG INJ SC/IM    Reviewed

 

                    2014 12:00 AM    THER/PROPH/DIAG INJ SC/IM    Reviewed

 

                    2014 12:00 AM    B12 Injection, Up to 1000 Mcg NDC#3190-8012-03    Reviewed

 

                    2010 12:00 AM    SKIN FUNGI CULTURE    Reviewed

 

                    10/9/2010 12:00 AM    COMPREHEN METABOLIC PANEL    Reviewed

 

                    10/9/2010 12:00 AM    LIPID PANEL         Reviewed

 

                    2010 12:00 AM    THER/PROPH/DIAG INJ SC/IM    Reviewed

 

                    2010 12:00 AM    B12 Injection Ndc#[address]-40 ([place])    Reviewed

 

                    2010 12:00 AM    THER/PROPH/DIAG INJ SC/IM    Reviewed

 

                    2010 12:00 AM    Kenalog 40 Mg Im-Ndc#[address]-86 ([place])    Reviewed

 

                    10/15/2010 12:00 AM    FLU VACCINE 3 YRS & > IM    Reviewed

 

                    10/15/2010 12:00 AM    Admin.Of M/C Cov.Vaccine-Flu Vacc.    Reviewed

 

                    1/15/2011 12:00 AM    COMPLETE CBC W/AUTO DIFF WBC    Reviewed

 

                    1/15/2011 12:00 AM    COMPREHEN METABOLIC PANEL    Reviewed

 

                    1/15/2011 12:00 AM    LIPID PANEL         Reviewed

 

                    2014 12:00 AM    MAMMOGRAM SCREENING    Reviewed

 

                    2014 12:00 AM    Screening mammography, bilateral    Reviewed

 

                    7/10/2014 12:00 AM    THER/PROPH/DIAG INJ SC/IM    Reviewed

 

                    7/10/2014 12:00 AM    B12 Injection, Up to 1000 Mcg NDC#7052-0819-55    Reviewed

 

                    2011 12:00 AM    COMPLETE CBC W/AUTO DIFF WBC    Reviewed

 

                    2011 12:00 AM    COMPREHEN METABOLIC PANEL    Reviewed

 

                    2011 12:00 AM    LIPID PANEL         Reviewed

 

                    2014 12:00 AM    B12 Injection, Up to 1000 Mcg NDC#4267-9077-13    Reviewed

 

                    10/19/2014 12:00 AM    MAMMOGRAM SCREENING    Reviewed

 

                    10/19/2014 12:00 AM    Screening mammography, bilateral    Reviewed

 

                    10/16/2014 12:00 AM    COMPLETE CBC W/AUTO DIFF WBC    Reviewed

 

                    10/16/2014 12:00 AM    COMPREHEN METABOLIC PANEL    Reviewed

 

                    10/16/2014 12:00 AM    IMMUNOASSAY TUMOR     Reviewed

 

                    10/16/2014 12:00 AM    LIPID PANEL         Reviewed

 

                    10/16/2014 12:00 AM    ASSAY OF LITHIUM    Reviewed

 

                    10/16/2014 12:00 AM    MAMMOGRAM SCREENING    Reviewed

 

                    2011 12:00 AM    ASSAY OF PARATHORMONE    Reviewed

 

                    2011 12:00 AM    VITAMIN D 25 HYDROXY    Reviewed

 

                    2011 12:00 AM    ASSAY OF LITHIUM    Reviewed

 

                    2011 12:00 AM    METABOLIC PANEL TOTAL CA    Reviewed

 

                    2011 12:00 AM    CT HEAD/BRAIN W/O & W/DYE    Reviewed

 

                    3/23/2015 12:00 AM    PNEUMOCOCCAL VACC 13 GLENDY IM    Reviewed

 

                    3/23/2015 12:00 AM    Vitamin B12 injection    Reviewed

 

                    2011 12:00 AM    ASSAY OF LITHIUM    Reviewed

 

                    2011 12:00 AM    B12 Injection Ndc#[address]-11  [place]    Reviewed

 

                    2015 12:00 AM    THER/PROPH/DIAG INJ SC/IM    Reviewed

 

                    2015 12:00 AM    B12 Injection, Up to 1000 Mcg NDC#7646-9131-84    Reviewed

 

                    2015 12:00 AM    COMPLETE CBC W/AUTO DIFF WBC    Reviewed

 

                    2015 12:00 AM    COMPREHEN METABOLIC PANEL    Reviewed

 

                    2015 12:00 AM    LIPID PANEL         Reviewed

 

                    2015 12:00 AM    ASSAY OF LITHIUM    Reviewed

 

                    2011 12:00 AM    VIT D 1 25-DIHYDROXY    Reviewed

 

                    2011 12:00 AM    VITAMIN B-12        Reviewed

 

                    2015 12:00 AM    B12 Injection, Up to 1000 Mcg NDC#0178-3464-11    Reviewed

 

                    2015 12:00 AM    THER/PROPH/DIAG INJ SC/IM    Reviewed

 

                    2015 12:00 AM    B12 Injection, Up to 1000 Mcg NDC#8462-3588-16    Reviewed

 

                    2011 12:00 AM    THER/PROPH/DIAG INJ SC/IM    Reviewed

 

                    2011 12:00 AM    B12 Injection ([facility]) Ndc#4536-9746-20    Reviewed

 

                    2015 12:00 AM    THER/PROPH/DIAG INJ SC/IM    Reviewed

 

                    2015 12:00 AM    B12 Injection, Up to 1000 Mcg NDC#0361-5246-27    Reviewed







Results Summary







                          Data and Description      Results

 

                          2004 12:00 AM        Colonoscopy-Women and Men over 50 Normal 

 

                          2008 12:00 AM         Pap Smear Declined 

 

                          10/7/2009 12:00 AM        Cholest Cry Stone Ql .0 %LDLc SerPl-mCnc 123.0 mg/dLHDLc

 SerPl-mCnc 34.0 mg/dLTrigl SerPl-mCnc 190.0 mg/dLGlucose SerPl-mCnc 78.0 mg/dL

 

                          2009 12:00 AM        Mammogram -Women over 40 Normal HIV1+2 Ab Ser Ql no risk 

 

                          2010 8:47 AM         Dexa Bone Scan Refused Aspirin reccommended Contraindication 



 

                          2010 8:48 AM         Depression Done 

 

                          2010 12:00 AM         Foot Exam-Diabetic Done 

 

                          2010 12:00 AM         Cholest Cry Stone Ql .0 %LDLc SerPl-mCnc 126.0 mg/dLGlucose

 SerPl-mCnc 102.0 mg/dL

 

                          2010 8:45 AM          TRIGLYCERIDES 122.0 mg/dLCHOLESTEROL 186.0 mg/dLHDL 36.0 mg/dLTOT

 CHOL/HDL 5.2 LDL (CALC) 126.0 mg/dLGLUCOSE 102.0 mg/dLSODIUM 143.0 
mmol/LPOTASSIUM 3.70 mmol/LCHLORIDE 111.0 mmol/LCO2 23.0 mmol/LBUN 10.0 
mg/dLCREATININE 0.80 mg/dLSGOT/AST 12.0 IU/LSGPT/ALT 11.0 IU/LALK PHOS 65.0 
IU/LTOTAL PROTEIN 7.20 g/dLALBUMIN 3.90 g/dLTOTAL BILI 0.50 mg/dLCALCIUM 10.20 
mg/dLAGE 59 GFR NonAA 73 GFR AA 88 eGFR >60 mL/min/1.73 m2eGFR AA* >60 WBC 5.7 
RBC 3.26 HGB 10.60 g/dLHCT 31.70 %MCV 97.0 fLMCH 32.50 pgMCHC 33.40 g/dLRDW SD 
47 RDW CV 13.30 %MPV 9.70 fLPLT 287 NRBC# 0.00 NRBC% 0.0 %NEUT 62.90 %%LYMP 
21.80 %%MONO 9.90 %%EOS 5.0 %%BASO 0.40 %#NEUT 3.56 #LYMP 1.23 #MONO 0.56 #EOS 
0.28 #BASO 0.02 MANUAL DIFF NOT IND 

 

                          2010 12:00 AM        Glucose SerPl-mCnc 96.0 mg/dLCholest Cry Stone Ql .0 %LDLc

 SerPl-mCnc 146.0 mg/dL

 

                          2010 8:26 AM         TRIGLYCERIDES 106.0 mg/dLCHOLESTEROL 199.0 mg/dLHDL 32.0 mg/dLTOT

 CHOL/HDL 6.2 LDL (CALC) 146.0 mg/dLGLUCOSE 96.0 mg/dLSODIUM 143.0 
mmol/LPOTASSIUM 4.0 mmol/LCHLORIDE 113.0 mmol/LCO2 24.0 mmol/LBUN 13.0 
mg/dLCREATININE 1.0 mg/dLSGOT/AST 11.0 IU/LSGPT/ALT 6.0 IU/LALK PHOS 56.0 
IU/LTOTAL PROTEIN 6.60 g/dLALBUMIN 3.80 g/dLTOTAL BILI 0.50 mg/dLCALCIUM 9.30 
mg/dLAGE 59 GFR NonAA 57 GFR AA 69 eGFR 57 eGFR AA* >60 

 

                          10/6/2010 12:00 AM        Cholest Cry Stone Ql .0 %LDLc SerPl-mCnc 111.0 mg/dLGlucose

 SerPl-mCnc 81.0 mg/dL

 

                          10/6/2010 2:45 PM         TRIGLYCERIDES 123.0 mg/dLCHOLESTEROL 178.0 mg/dLHDL 42.0 mg/dLTOT

 CHOL/HDL 4.2 LDL (CALC) 111.0 mg/dLGLUCOSE 81.0 mg/dLSODIUM 139.0 
mmol/LPOTASSIUM 4.10 mmol/LCHLORIDE 106.0 mmol/LCO2 24.0 mmol/LBUN 13.0 
mg/dLCREATININE 0.90 mg/dLSGOT/AST 13.0 IU/LSGPT/ALT 11.0 IU/LALK PHOS 61.0 
IU/LTOTAL PROTEIN 7.10 g/dLALBUMIN 3.90 g/dLTOTAL BILI 0.30 mg/dLCALCIUM 9.30 
mg/dLAGE 60 GFR NonAA 64 GFR AA 78 eGFR >60 mL/min/1.73 m2eGFR AA* >60 WBC 6.9 
RBC 3.59 HGB 11.50 g/dLHCT 35.30 %MCV 98.0 fLMCH 32.0 pgMCHC 32.60 g/dLRDW SD 46
 RDW CV 12.90 %MPV 9.90 fLPLT 311 NRBC# 0.00 NRBC% 0.0 %NEUT 64.90 %%LYMP 22.50 
%%MONO 7.20 %%EOS 5.10 %%BASO 0.30 %#NEUT 4.45 #LYMP 1.54 #MONO 0.49 #EOS 0.35 
#BASO 0.02 MANUAL DIFF NOT IND 

 

                          2011 12:00 AM         Mammogram -Women over 40 Ordered 

 

                          2011 10:25 AM        TRIGLYCERIDES 111.0 mg/dLCHOLESTEROL 195.0 mg/dLHDL 43.0 mg/dLTOT

 CHOL/HDL 4.5 LDL (CALC) 130.0 mg/dLWBC 5.3 RBC 3.76 HGB 12.0 g/dLHCT 37.80 %MCV
 101.0 fLMCH 31.90 pgMCHC 31.70 g/dLRDW SD 47 RDW CV 13.0 %MPV 9.70 fLPLT 259 
NRBC# 0.00 NRBC% 0.0 %NEUT 69.0 %%LYMP 17.60 %%MONO 8.30 %%EOS 4.70 %%BASO 0.40 
%#NEUT 3.63 #LYMP 0.93 #MONO 0.44 #EOS 0.25 #BASO 0.02 MANUAL DIFF NOT IND 
GLUCOSE 102.0 mg/dLSODIUM 146.0 mmol/LPOTASSIUM 4.20 mmol/LCHLORIDE 113.0 
mmol/LCO2 23.0 mmol/LBUN 15.0 mg/dLCREATININE 1.0 mg/dLSGOT/AST 12.0 
IU/LSGPT/ALT 17.0 IU/LALK PHOS 60.0 IU/LTOTAL PROTEIN 6.90 g/dLALBUMIN 4.20 
g/dLTOTAL BILI 0.40 mg/dLCALCIUM 9.70 mg/dLAGE 60 GFR NonAA 57 GFR AA 69 eGFR 57
 eGFR AA* >60 

 

                          2011 11:49 AM        Cholest Cry Stone Ql .0 %LDLc SerPl-mCnc 130.0 mg/dLHDLc

 SerPl-mCnc 43.0 mg/dLTrigl SerPl-mCnc 111.0 mg/dLGlucose SerPl-mCnc 102.0 mg/dL

 

                          2011 11:52 AM        Pap Smear Declined 

 

                          2011 11:28 AM        Lithium 2.080 mmol/LGLUCOSE 102.0 mg/dLSODIUM 135.0 mmol/LPOTASSIUM

 3.90 mmol/LCHLORIDE 106.0 mmol/LCO2 21.0 mmol/LBUN 12.0 mg/dLCREATININE 1.30 
mg/dLCALCIUM 10.70 mg/dLAGE 60 GFR NonAA 42 GFR AA 51 eGFR 42 eGFR AA* 51 

 

                          2011 8:58 AM          Lithium 0.690 mmol/L

 

                          2011 2:38 PM         VITAMIN B12 3483.0 pg/mL

 

                          2013 3:35 PM          WBC 5.1 RBC 3.73 HGB 11.70 g/dLHCT 36.40 %MCV 98.0 fLMCH 31.40

 pgMCHC 32.10 g/dLRDW SD 47 RDW CV 13.10 %MPV 9.80 fLPLT 224 NRBC# 0.00 NRBC% 
0.0 %NEUT 66.80 %%LYMP 19.10 %%MONO 9.0 %%EOS 4.90 %%BASO 0.20 %#NEUT 3.42 #LYMP
 0.98 #MONO 0.46 #EOS 0.25 #BASO 0.01 MANUAL DIFF NOT IND GLUCOSE 88.0 
mg/dLSODIUM 141.0 mmol/LPOTASSIUM 4.10 mmol/LCHLORIDE 110.0 mmol/LCO2 22.0 
mmol/LBUN 22.0 mg/dLCREATININE 1.10 mg/dLCALCIUM 9.80 mg/dLAGE 62 GFR NonAA 50 
GFR AA 61 eGFR 50 eGFR AA* 60 Lithium 0.760 mmol/L

 

                          2013 11:02 AM        TRIGLYCERIDES 106.0 mg/dLCHOLESTEROL 181.0 mg/dLHDL 46.0 mg/dLTOT

 CHOL/HDL 3.9 LDL (CALC) 114.0 mg/dLVITAMIN D 41.10 ng/mL

 

                          10/17/2014 10:10 AM       WBC 5.0 RBC 3.66 HGB 11.60 g/dLHCT 36.80 %.0 fLH 31.70

 [facility] 31.[facility] g/dLRDW SD 50 RDW CV 13.50 %MPV 10.10 fLPLT 209 NRBC# 0.00 NRBC% 
0.0 %NEUT 69.20 %%LYMP 21.0 %%MONO 6.40 %%EOS 3.20 %%BASO 0.20 %#NEUT 3.46 #LYMP
 1.05 #MONO 0.32 #EOS 0.16 #BASO 0.01 MANUAL DIFF NOT IND GLUCOSE 100.0 
mg/dLSODIUM 148.0 mmol/LPOTASSIUM 3.90 mmol/LCHLORIDE 114.0 mmol/LCO2 26.0 
mmol/LBUN 12.0 mg/dLCREATININE 1.20 mg/dLSGOT/AST 9.0 IU/LSGPT/ALT <6 IU/LALK 
PHOS 82.0 IU/LTOTAL PROTEIN 6.90 g/dLALBUMIN 4.0 g/dLTOTAL BILI 0.40 
mg/dLCALCIUM 10.50 mg/dLAGE 64 GFR NonAA 45 GFR AA 55 eGFR 45 eGFR AA* 55 
TRIGLYCERIDES 96.0 mg/dLCHOLESTEROL 155.0 mg/dLHDL 38.0 mg/dLTOT CHOL/HDL 4.1 
LDL (CALC) 98.0 mg/dLLithium 0.850 mmol/LCancer Antigen (CA) 125 8.30 U/mL

 

                          2015 10:25 AM        Lithium 0.790 mmol/LWBC 4.8 RBC 3.44 HGB 11.0 g/dLHCT 35.20 

%.0 fLMCH 32.0 pgMCHC 31.30 g/dLRDW SD 53 RDW CV 14.0 %MPV 9.30 fLPLT 210
 NRBC# 0.00 NRBC% 0.0 %NEUT 70.80 %%LYMP 17.20 %%MONO 8.10 %%EOS 3.50 %%BASO 
0.40 %#NEUT 3.41 #LYMP 0.83 #MONO 0.39 #EOS 0.17 #BASO 0.02 MANUAL DIFF NOT IND 
TRIGLYCERIDES 107.0 mg/dLCHOLESTEROL 174.0 mg/dLHDL 43.0 mg/dLTOT CHOL/HDL 4.0 
LDL (CALC) 110.0 mg/dLGLUCOSE 90.0 mg/dLSODIUM 145.0 mmol/LPOTASSIUM 3.80 
mmol/LCHLORIDE 115.0 mmol/LCO2 24.0 mmol/LBUN 17.0 mg/dLCREATININE 1.30 
mg/dLSGOT/AST 18.0 IU/LSGPT/ALT 17.0 IU/LALK PHOS 56.0 IU/LTOTAL PROTEIN 6.70 
g/dLALBUMIN 3.90 g/dLTOTAL BILI 0.40 mg/dLCALCIUM 9.80 mg/dLAGE 64 GFR NonAA 41 
GFR AA 50 eGFR 41 eGFR AA* 50 

 

                          2015 8:50 AM        WBC 5.8 RBC 3.29 HGB 10.70 g/dLHCT 34.0 %.0 fLMCH 32.50

 pgMCHC 31.50 g/dLRDW SD 52 RDW CV 13.60 %MPV 9.60 fLPLT 223 NRBC# 0.00 NRBC% 
0.0 %NEUT 69.60 %%LYMP 18.90 %%MONO 8.50 %%EOS 2.80 %%BASO 0.20 %#NEUT 4.03 
#LYMP 1.09 #MONO 0.49 #EOS 0.16 #BASO 0.01 MANUAL DIFF NOT IND Lithium 0.620 
mmol/LGLUCOSE 83.0 mg/dLSODIUM 139.0 mmol/LPOTASSIUM 3.90 mmol/LCHLORIDE 109.0 
mmol/LCO2 22.0 mmol/LBUN 19.0 mg/dLCREATININE 1.40 mg/dLSGOT/AST 19.0 
IU/LSGPT/ALT 21.0 IU/LALK PHOS 55.0 IU/LTOTAL PROTEIN 6.50 g/dLALBUMIN 3.90 
g/dLTOTAL BILI 0.50 mg/dLCALCIUM 9.60 mg/dLAGE 65 GFR NonAA 38 GFR AA 46 eGFR 38
 eGFR AA* 46 TRIGLYCERIDES 121.0 mg/dLCHOLESTEROL 192.0 mg/dLHDL 51.0 mg/dLTOT 
CHOL/HDL 3.8 .0 mg/dLFREE T4 0.79 TSH 1.210 uIU/mLHemoglobin A1c 5.40 
%Estim. Avg Glu (eAG) 108 

 

                          3/15/2016 8:08 AM         VITAMIN B12 696.0 pg/mL

 

                          3/23/2016 8:26 AM         WBC 7.0 RBC 3.61 HGB 11.80 g/dLHCT 37.70 %.0 fLMCH 32.70

 pgMCHC 31.30 g/dLRDW SD 49 RDW CV 12.50 %MPV 10.0 fLPLT 207 NRBC# 0.00 NRBC% 
0.0 %NEUT 73.60 %%LYMP 16.40 %%MONO 6.60 %%EOS 3.0 %%BASO 0.30 %#NEUT 5.15 #LYMP
 1.15 #MONO 0.46 #EOS 0.21 #BASO 0.02 MANUAL DIFF NOT IND Lithium 0.940 
mmol/LGLUCOSE 108.0 mg/dLSODIUM 143.0 mmol/LPOTASSIUM 4.30 mmol/LCHLORIDE 110.0 
mmol/LCO2 27.0 mmol/LBUN 16.0 mg/dLCREATININE 1.60 mg/dLSGOT/AST 13.0 
IU/LSGPT/ALT 7.0 IU/LALK PHOS 71.0 IU/LTOTAL PROTEIN 6.80 g/dLALBUMIN 4.0 
g/dLTOTAL BILI 0.20 mg/dLCALCIUM 10.40 mg/dLAGE 65 GFR NonAA 32 GFR AA 39 eGFR 
32 eGFR AA* 39 TRIGLYCERIDES 113.0 mg/dLCHOLESTEROL 169.0 mg/dLHDL 42.0 mg/dLTOT
 CHOL/HDL 4.0 LDL (CALC) 104.0 mg/dLFREE T4 0.86 TSH 2.20 uIU/mLHemoglobin A1c 
5.20 %Estim. Avg Glu (eAG) 103 

 

                          3/25/2016 9:17 AM         COLOR YELLOW APPEARANCE CLEAR SPEC GRAV 1.010 pH 7.0 PROTEIN 

NEGATIVE GLUCOSE NEGATIVE mg/dLKETONE NEGATIVE BILIRUBIN NEGATIVE BLOOD NEGATIVE
 NITRITE NEGATIVE LEUK SCREEN SMALL MICRO IND? SEE BELOW WBC/HPF 0-5 RBC/HPF 
NEGATIVE CASTS/LPF NEGATIVE /LPFCRYSTALS NEGATIVE MUCOUS THRDS NEGATIVE BACTERIA
 NEGATIVE EPITH CELLS FEW SQUAMOUS /HPFTRICHOMONAS NEGATIVE YEAST NEGATIVE 







History Of Immunizations







       Name    Date Admin    Mfg Name    Mfg Code    Trade Name    Lot#    Route    Inj    Vis Given    Vis

 Pub                                    CVX

 

        Influenza    2008    Not Entered    NE      Not Entered            Not Entered    Not Entered

                    1            999

 

        X       12/19/2008    [employer]    MSD     Pneumovax 23            Intramuscular    Not Entered

                    1            999

 

           Influenza    10/15/2010    [facility]    NOV        Fluvirin > 12 Years    

[id number]     Intramuscular    Left Deltoid    10/15/2010    2009    999

 

          X         3/23/2015    [facility]       Prevnar 13    [id number]    Intramuscular

                Right Gluteous Medius    3/23/2015       2013       109







History of Past Illness







                    Name                Date of Onset       Comments

 

                    Peritoneal Neoplasm, Malignant                         

 

                    Hyperlipidemia                           

 

                    Bipolar disorder, unspecified                         

 

                    Artificial opening status; colostomy                         

 

                    B12 deficiency                           

 

                    Anemia, Pernicious                         

 

                    Arthritis unspecified                         

 

                    cervical cancer                          

 

                    Artificial opening status; colostomy    2010  1:10PM     

 

                    Bipolar disorder, unspecified    2010  1:10PM     

 

                    Hyperlipidemia      2010  1:10PM     

 

                    Anemia, Pernicious    2010  1:10PM     

 

                    Postoperative Follow-Up    2010  1:55PM     

 

                    Postoperative Follow-Up    Mar  8 2010 10:57AM     

 

                    Artificial opening status; colostomy    Mar  8 2010  1:19PM     

 

                    Peritoneal Neoplasm, Malignant    Mar  8 2010  1:19PM     

 

                    Artificial opening status; colostomy    2010  1:40PM     

 

                    Hyperlipidemia      2010  1:40PM     

 

                    Anemia, Pernicious    2010  1:40PM     

 

                    Peritoneal Neoplasm, Malignant    2010  1:40PM     

 

                    Arthritis unspecified    2010  1:40PM     

 

                    Anemia of Chronic Illness    2010  1:40PM     

 

                    Tinea corporis      2010  3:17PM     

 

                    Bipolar disorder, unspecified    2010  1:33PM     

 

                    Hyperlipidemia      2010  1:33PM     

 

                    Anemia, Pernicious    2010  1:33PM     

 

                    Peritoneal Neoplasm, Malignant    2010  1:33PM     

 

                    B12 deficiency      2010  1:33PM     

 

                    Ethmoidal Sinusitis, Acute    Sep 21 2010  3:53PM     

 

                    Wheezing            Sep 21 2010  3:53PM     

 

                    Flu                 Oct 15 2010  1:40PM     

 

                    Bipolar disorder, unspecified    Oct 15 2010  1:42PM     

 

                    Hyperlipidemia      Oct 15 2010  1:42PM     

 

                    Anemia, Pernicious    Oct 15 2010  1:42PM     

 

                    Peritoneal Neoplasm, Malignant    Oct 15 2010  1:42PM     

 

                    Bipolar disorder, unspecified    2011 12:01PM     

 

                    Hyperlipidemia      2011 12:01PM     

 

                    Anemia, Pernicious    2011 12:01PM     

 

                    Peritoneal Neoplasm, Malignant    2011 12:01PM     

 

                    Bipolar disorder, unspecified    Apr 15 2011 10:55AM     

 

                    Major Depression    2011 10:11AM     

 

                    Bipolar Disorder    2011 10:11AM     

 

                    Cancer              May 10 2011  4:16PM     

 

                    Major Depression    May 10 2011  3:16PM     

 

                    Bipolar Disorder    May 10 2011  3:16PM     

 

                    Hypercalcemia       May 23 2011  2:47PM     

 

                    Bipolar disorder, unspecified    May 23 2011  2:47PM     

 

                    Colon Cancer, Personal History    May 23 2011  2:47PM     

 

                    Bipolar Disorder    May 31 2011  4:39PM     

 

                    Depressive Disorder    2011 10:01AM     

 

                    Vitamin B12 deficiency    2011 10:01AM     

 

                    Vitamin D Deficiency    2011  5:07PM     

 

                    Anemia, Vitamin B12 Deficiency    2011  5:07PM     

 

                    B12 deficiency      2011  3:56PM     

 

                    Routine gynecological examination    Aug  4 2011  9:08AM     

 

                    Screening Examination for Breast Cancer    Aug  4 2011  9:08AM     

 

                    Tinea Corporis      Aug  4 2011  9:08AM     

 

                    Depressive Disorder    Sep 23 2011  8:47AM     

 

                    Contact Dermatitis    Sep 23 2011  8:47AM     

 

                    Anemia, Pernicious    Sep 23 2011  8:47AM     

 

                    B12 deficiency      Sep 23 2011  8:47AM     

 

                    B12 deficiency      Sep 27 2011  2:58PM     

 

                    B12 deficiency      Oct 20 2011  2:34PM     

 

                    Flu                 Dec  9 2011  3:16PM     

 

                    B12 deficiency      Dec  9 2011  3:17PM     

 

                    B12 deficiency      2012  4:52PM     

 

                    B12 deficiency      2012 11:10AM     

 

                    B12 deficiency      2012  3:37PM     

 

                    B12 deficiency      May  3 2012  4:10PM     

 

                    B12 deficiency      2012  2:54PM     

 

                    B12 deficiency      2012 11:23AM     

 

                    B12 deficiency      Aug  9 2012  2:08PM     

 

                    B12 deficiency      Sep  6 2012  4:36PM     

 

                    B12 deficiency      Oct 16 2012 10:23AM     

 

                    Flu                 Feb  2013  3:11PM     

 

                    Bipolar disorder, unspecified    Feb  2013  2:48PM     

 

                    Anemia, Pernicious    Feb  2013  2:48PM     

 

                    B12 deficiency      Feb  2013  2:48PM     

 

                    Extrapyramidal abnormal movement disorder    Feb  2013  2:48PM     

 

                    B12 deficiency      Apr  3 2013 12:03PM     

 

                    Bipolar disorder, unspecified    May  7 2013  1:31PM     

 

                    Anemia, Pernicious    May  7 2013  1:31PM     

 

                    B12 deficiency      May  7 2013  1:31PM     

 

                    Extrapyramidal abnormal movement disorder    May  7 2013  1:31PM     

 

                    B12 deficiency      2013  3:42PM     

 

                    B12 deficiency      2013  1:31PM     

 

                    Hyperlipidemia      Aug  7 2013 10:37AM     

 

                    Vitamin D Deficiency    Aug  7 2013 10:37AM     

 

                    Bipolar disorder, unspecified    Aug  7 2013 10:37AM     

 

                    Anemia, Pernicious    Aug  7 2013 10:37AM     

 

                    B12 deficiency      Aug  7 2013 10:37AM     

 

                    B12 deficiency      Sep 25 2013 11:15AM     

 

                    B12 deficiency      Dec 11 2013  3:16PM     

 

                    B12 deficiency      Mar  6 2014  1:48PM     

 

                    B12 deficiency      May 21 2014  3:17PM     

 

                    Screening Examination for Breast Cancer    2014  3:23PM     

 

                    Periumbilical abdominal pain    2014  3:23PM     

 

                    B12 deficiency      Jul 10 2014  2:52PM     

 

                    Anemia, Vitamin B12 Deficiency    Aug 13 2014  4:50PM     

 

                    Bipolar disorder    Oct 16 2014 11:13AM     

 

                    Hyperlipidemia      Oct 16 2014 11:13AM     

 

                    Anemia, Pernicious    Oct 16 2014 11:13AM     

 

                    Peritoneal Neoplasm, Malignant    Oct 16 2014 11:13AM     

 

                    Screening breast examination    Oct 16 2014 11:13AM     

 

                    Weight loss         Oct 16 2014 11:13AM     

 

                    Anemia, Pernicious    Mar 23 2015  2:57PM     

 

                    B12 deficiency      Mar 23 2015  2:57PM     

 

                    Need for Prevnar vaccine    Mar 23 2015  2:57PM     

 

                    Bipolar disorder    Mar 23 2015  2:57PM     

 

                    Hyperlipidemia      Mar 23 2015  2:57PM     

 

                    Anemia, Pernicious    Mar 23 2015  2:57PM     

 

                    Peritoneal Neoplasm, Malignant    Mar 23 2015  2:57PM     

 

                    B12 deficiency      May  4 2015  4:48PM     

 

                    Hyperlipidemia      May 13 2015  9:56AM     

 

                    Anemia              May 13 2015  9:56AM     

 

                    Bipolar disorder    May 13 2015  9:56AM     

 

                    Bipolar disorder    May 14 2015  3:27PM     

 

                    Hyperlipidemia      May 14 2015  3:27PM     

 

                    Anemia, Pernicious    May 14 2015  3:27PM     

 

                    Peritoneal Neoplasm, Malignant    May 14 2015  3:27PM     

 

                    B12 deficiency      2015  2:20PM     

 

                    B12 deficiency      2015 11:34AM     

 

                    B12 deficiency      Aug 18 2015  9:06AM     

 

                    Tinea Corporis      Sep 18 2015  8:54AM     

 

                    B12 deficiency      Sep 18 2015  8:54AM     

 

                    B12 deficiency      2015 10:28AM     

 

                    Herpes zoster without complication    Dec  3 2015  9:52AM     

 

                    B12 deficiency      Dec 23 2015 11:21AM     

 

                    B12 deficiency      2016  4:51PM     

 

                    Vitamin B 12 deficiency    Mar 14 2016  5:35PM     

 

                    B12 deficiency      Mar 15 2016 12:14PM     

 

                    B12 deficiency      May  5 2016 11:30AM     

 

                    Edema               May  5 2016 11:30AM     

 

                    Dermatitis          May  5 2016 11:30AM     

 

                    Edema               May 17 2016  8:38AM     

 

                    Shortness of breath    May 17 2016  8:38AM     

 

                    Bilateral edema of lower extremity    2016  2:06PM     

 

                    B12 deficiency      2016  2:06PM     

 

                    B12 deficiency      2016 11:50AM     

 

                    B12 deficiency      2016 11:20AM     

 

                    Diarrhea            Aug  2 2016  3:13PM     

 

                    B12 deficiency      Aug 24 2016 11:10AM     

 

                    Encounter for screening mammogram for breast cancer    Aug 24 2016 11:44AM     

 

                    B12 deficiency      Sep 28 2016  2:35PM     

 

                    B12 deficiency      Dec 15 2016  2:02PM     







Payers







           Insurance Name    Company Name    Plan Name    Plan Number    Policy Number    Policy Group

 Number                                 Start Date

 

                    Medicare Part A    Medicare [facility]              [id number]              N/A

 

                          [employer]                 [id number]

                                                    

 

                    Medicare Part A    Medicare - Lab/Xray              [id number]              2006

 

                    Medicare Part B    Medic[place]              [id number]              2006

 

                          [employer] Services Financial Assistance    [employer]                 50 percent

                                                    2009

 

                    Human    [employer]              [id number]              N/A

 

                    Medicare Part A    Medicare Part A              [id number]              N/A

 

                    Medicare Part A    Medicare Part A              [id number]              2006









History of Encounters







                    Visit Date          Visit Type          Provider

 

                    12/15/2016          Nurse visit         Bhupinder Louise DO

 

                    2016           Nurse visit         Bret Spiveyran APRN

 

                    2016           Nurse visit         Bhupinder Aspen DO

 

                    2016            Office visit        Bhupinder Aspen DO

 

                    2016           Nurse visit         Bhupinder Aspen DO

 

                    2016           Office visit        Bret Forte APRN

 

                    2016            Office visit        Bhupinder Aspen DO

 

                    3/15/2016           Nurse visit         Bhupinder Aspen DO

 

                    2016            Nurse visit         Bhupinder Aspen DO

 

                    2015          Nurse visit         Bhupinder Aspen DO

 

                    12/3/2015           Office visit        Bhupinder Aspen DO

 

                    2015          Nurse visit         Bhupinder Aspen DO

 

                    2015           Office visit        Bhupinder Aspen DO

 

                    2015           Nurse visit         Bhupinder Aspen DO

 

                    2015            Nurse visit         Bhupinder Aspen DO

 

                    2015            Nurse visit         Bhupinder Aspen DO

 

                    2015           Office visit        Bhupinder Aspen DO

 

                    2015            Nurse visit         Bhupinder Aspen DO

 

                    3/23/2015           Office visit        Bhupinder Aspen DO

 

                    10/16/2014          Office visit        Bhupinder Aspen DO

 

                    2014           Nurse visit         Radha Weston APRN

 

                    7/10/2014           Nurse visit         Bhupinder Aspen DO

 

                    2014           Office visit        Bhupinder Aspen DO

 

                    2014           Nurse visit         Bhupinder Aspen DO

 

                    3/6/2014            Nurse visit         Bhupinder Aspen DO

 

                    2014            [facility]            EARNEST Lopez MD

 

                    2013          Nurse visit         Bhupinder Aspen DO

 

                    2013           Nurse visit         Bhupinder Aspen DO

 

                    2013            Office visit        Bhupinder Aspen DO

 

                    2013            Nurse visit         Bhupinder Aspen DO

 

                    2013            Nurse visit         Bhupinder Aspen DO

 

                    2013            Office visit        Bhupinder Aspen DO

 

                    4/3/2013            Nurse visit         Bhupinder Aspen DO

 

                    2013            Office visit        Bhupinder Aspen DO

 

                    10/16/2012          Nurse visit         Bhupinder Aspen DO

 

                    2012            Nurse visit         Bhupinder Aspen DO

 

                    2012            Voided              Bhupinder Aspen DO

 

                    2012            Nurse visit         Bhupinder Aspen DO

 

                    2012            Nurse visit         Bhupinder Aspen DO

 

                    2012           Nurse visit         Bhupinder Aspen DO

 

                    5/3/2012            Nurse visit         Bhupinder Aspen DO

 

                    2012           Nurse visit         Bhupinder Aspen DO

 

                    2012           Nurse visit         Bhupinder Aspen DO

 

                    2012           Nurse visit         Bhupinder Aspen DO

 

                    2011           Nurse visit         Bhupinder Aspen DO

 

                    10/20/2011          Nurse visit         Bhupinder Aspen DO

 

                    2011           Office visit        Bhupinder Aspen DO

 

                    2011           Nurse visit         Radha GREEN

 

                    2011            Office visit        Bhupinder Aspen DO

 

                    2011           Nurse visit         Bhupinder Aspen DO

 

                    2011            Office visit        Bhupinder Aspen DO

 

                    2011           Office visit        Bhupinder Aspen DO

 

                    5/10/2011           Office visit        Bhupinder Aspen DO

 

                    2011           Office visit        Bhupinder Aspen DO

 

                    4/15/2011           Office visit        Devin Angel DO

 

                    2011           Office visit        Devin Angel DO

 

                    10/15/2010          Office visit        Devin Angel DO

 

                    2010           Office visit        Devin Angel DO

 

                    2010            Office visit        Devin Angel DO

 

                    2010           Office visit        Devin Angel DO

 

                    2010            Office visit        Devin Angel DO

 

                    3/8/2010            Office visit        Devin Masterson MD

 

                    2010            Surgery             Devin Masterson MD

 

                    2010            Office visit        Devin Angel DO

 

                    2010           Surgery             Devin Masterson MD

 

                    2010           [facility]            Devin Masterson MD

 

                    2010           [facility]            Devin Masterson MD

 

                    10/22/2009          Office visit        Devin Angel DO

## 2019-06-26 NOTE — XMS REPORT
MU2 Ambulatory Summary

                             Created on: 2017



Pauline Gan

External Reference #: [id number]

: 1950

Sex: Female



Demographics







                          Address                   [address]

[place]  [id number]

 

                          Home Phone                [phone]

 

                          Preferred Language        English

 

                          Marital Status            Legally 

 

                          Yazidi Affiliation     Unknown

 

                          Race                      White

 

                          Ethnic Group              Not  or 





Author







                          Author                    Pranav Angel

 

                          Organization              [facility]

 

                          Address                   [address]

[place]  [id number]



 

                          Phone                     [phone]







Care Team Providers







                    Care Team Member Name    Role                Phone

 

                    Pranav Angel     PCP                 Unavailable

 

                    Bhupinder Louise    PreferredProvider    Unavailable







Allergies and Adverse Reactions







                    Name                Reaction            Notes

 

                    NO KNOWN DRUG ALLERGIES                         







Plan of Treatment







             Planned Activity    Comments     Planned Date    Planned Time    Plan/Goal

 

             Injection,Subcutaneous/Intramuscul, [facility]                 2017    12:00 AM      







Medications







                                        Active 

 

             Name         Start Date    Estimated Completion Date    SIG          Comments

 

                Latuda 20 mg oral tablet                                    take 1 tablet (20 mg) by oral route once daily with

 food (at least 350 calories)            

 

             pravastatin 40 mg oral tablet    3/30/2015                 TAKE 1 TABLET BY MOUTH DAILY     

 

                Namenda XR 28 mg oral capsule,sprinkle,ER 24hr    2015                       take 1 capsule (28

 mg) by oral route once daily            

 

                Namenda XR 28 mg oral capsule,sprinkle,ER 24hr    2016                       take 1 capsule (28

 mg) by oral route once daily            

 

                potassium chloride 10 mEq oral tablet extended release    2016                       take 1 tablet

 (10 meq) by oral route once daily       

 

             pravastatin 40 mg oral tablet    2016                 TAKE 1 TABLET BY MOUTH DAILY     

 

                Vitamin B-12 1,000 mcg/mL injection solution    2016                       inject 1 milliliter 

(1,000 mcg) by intramuscular route once a month     

 

                potassium chloride 10 mEq oral tablet extended release    2016                      take 1 tablet

 (10 meq) by oral route once daily       

 

                Namenda XR 28 mg oral capsule,sprinkle,ER 24hr    2016                      TAKE 1 CAPSULE BY

 MOUTH EVERY DAY                         

 

                furosemide 40 mg oral tablet    2016                      take 1 tablet (40 mg) by oral route

 once daily                              

 

                mirtazapine 45 mg oral tablet                                    take 1 tablet (45 mg) by oral route once daily

 before bedtime                          

 

             Fish Oil 300-1,000 mg oral capsule                              take 1 capsule by oral route daily     

 

             Vitamin D3 1,000 unit oral tablet                              take 1 tablet by oral route daily     

 

                Calcium 600 600 mg calcium (1,500 mg) oral tablet                                    take 1 tablet by oral route

 daily                                   

 

                Aspirin Low Dose 81 mg oral tablet,delayed release (DR/EC)                                    take 1 tablet 

(81 mg) by oral route once daily         

 

                metoclopramide HCl 10 mg oral tablet    2017                       take 1 tablet by oral route 

2 times a day for 50 days                

 

             furosemide 40 mg oral tablet    2017                 TAKE 1 TABLET BY MOUTH DAILY     

 

                Namenda XR 28 mg oral capsule,sprinkle,ER 24hr    2017                       TAKE 1 CAPSULE BY 

MOUTH EVERY DAY                          

 

                Linzess 72 mcg oral capsule    2017                       take 1 capsule (72 mcg) by oral route

 once daily on an empty stomach at least 30 minutes before 1st meal of the day     



 

             pravastatin 40 mg oral tablet    2017                 TAKE 1 TABLET BY MOUTH DAILY     

 

                metoclopramide HCl 10 mg oral tablet    2017                       TAKE 1 TABLET BY ORAL ROUTE 

2 TIMES A DAY FOR 50 DAYS                

 

                potassium chloride 10 mEq oral tablet extended release    2017                       TAKE 2 TABLETs

 BY MOUTH twice DAILY for one week       

 

                potassium chloride 10 mEq oral tablet extended release    2017                       TAKE 2 TABLETs

 BY MOUTH twice DAILY for one week       

 

             simvastatin 20 mg oral tablet    2017                 TAKE 1 TABLET BY MOUTH AT BEDTIME    

 

 

             famotidine 20 mg oral tablet    2017                 TAKE 1 TABLET BY MOUTH TWICE DAILY    

 

 

             memantine 10 mg oral tablet    2017                 TAKE 1 TABLET BY MOUTH TWICE DAILY     



 

                rivastigmine tartrate 1.5 mg oral capsule    2017                       TAKE 1 CAPSULE BY MOUTH

 TWICE DAILY BEFORE MEALS                

 

             famotidine 20 mg oral tablet    2017                 TAKE 1 TABLET BY MOUTH TWICE DAILY    

 

 

                carbamazepine 200 mg oral tablet    2017                       TAKE 1 TABLET BY MOUTH BEFORE BREAKFAST

 AND TAKE 2 TABLETS AT BEDTIME           









                                         

 

             Name         Start Date    Expiration Date    SIG          Comments

 

             Reglan 10mg    3/29/2010    2010    one ac and hs     

 

                Keflex 500 mg oral capsule    2010       10/1/2010       take 1 capsule (500 mg) by oral

 route every 6 hours for 10 days         

 

                Bactrim -160 mg oral tablet    2011       take 1 tablet by oral route

 every 12 hours for 7 days               

 

                triamcinolone acetonide 0.1 % topical cream    2011      apply a thin

 layer to the affected area(s) by topical route 2 times per day     

 

                sertraline 100 mg oral tablet    4/10/2012       5/10/2012       take 1.5 tablets by oral route

 daily for 30 days                       

 

                ergocalciferol (vitamin D2) 50,000 unit oral capsule    4/15/2013       2013       TAKE

 ONE CAPSULE BY MOUTH ONCE A WEEK        

 

                CYANOCOBALAM 1000MCGINJ 1000 milliliter    2013       INJECT 1ML INTRAMUSCULAR

 ONCE A MONTH                            

 

                pravastatin 40 mg oral tablet    3/25/2014       3/20/2015       TAKE ONE TABLET BY MOUTH EVERY

 DAY                                     

 

                          Zostavax (PF) 19,400 unit/0.65 mL subcutaneous suspension for reconstitution    3/23/2015

                    3/24/2015           inject 0.65 milliliter by subcutaneous route once     

 

                famciclovir 500 mg oral tablet    12/3/2015       12/10/2015      take 1 tablet (500 mg) by

 oral route every 8 hours for 7 days     

 

                furosemide 40 mg oral tablet    2016      take 1 tablet (40 mg) by oral

 route once daily                        

 

                Cipro 500 mg oral tablet    2016       take 1 tablet (500 mg) by oral route

 2 times per day for 5 days              

 

                Bactrim -160 mg oral tablet    2016        take 1 tablet by oral route

 every 12 hours for 7 days               

 

                metoclopramide HCl 10 mg oral tablet    2017       take 1 tablet by oral

 route 2 times a day for 50 days         

 

                Macrobid 100 mg oral capsule    2017       take 1 capsule (100 mg) by oral

 route 2 times per day with food for 7 days     

 

                Augmentin 875-125 mg oral tablet    2017       take 1 tablet by oral route

 every 12 hours for 7 days               

 

                amoxicillin 500 mg oral tablet    2017       take 1 tablet (500 mg) by oral

 route every 12 hours for 7 days         

 

                ciprofloxacin HCl 500 mg oral tablet    2017       take 1 tablet (500 mg)

 by oral route every 12 hours for 5 days     

 

                cefuroxime axetil 500 mg oral tablet    2017        take 1 tablet (500 mg)

 by oral route 2 times per day for 10 days     









                                        Discontinued 

 

             Name         Start Date    Discontinued Date    SIG          Comments

 

                Tylenol 325 mg oral tablet                    2013        take 1 - 2 tablets (325 -650 mg) by oral

 route every 4-6 hours as needed         

 

                Calcium 600 + D(3) 600 mg(1,500mg) -400 unit oral tablet                    2011       take 1 tablet

 by oral route 2 times a day            no longer taking

 

                Vitamin B-12 1,000 mcg oral tablet extended release    2010       take 1

 tablet by oral route daily             no longer taking

 

                Antifungal (clotrimazole) 1 % topical cream    2010       apply to the 

affected and surrounding areas of skin by topical route 2 times per day morning 
and evening                              

 

                sertraline 100 mg oral tablet    5/10/2011       2011       take 2 tablets (200 mg) by 

oral route once daily                   discontinued by Dr. Serrano

 

                mirtazapine 15 mg oral tablet                    2011        take 1 tablet (15 mg) by oral route 

once daily before bedtime               Dr. Serrano

 

                mirtazapine 15 mg oral tablet                    2011        take 1 tablet (15 mg) by oral route 

once daily before bedtime               dc'd by Dr. Serrano

 

                Pristiq 50 mg oral tablet extended release 24 hr                    2013        take 1 tablet (50

 mg) by oral route once daily           Dr. Serrano

 

                Pristiq 50 mg oral tablet extended release 24 hr                    2013        take 1 tablet (50

 mg) by oral route once daily           dose updated

 

                Vitamin B-12 1,000 mcg/mL injection solution    2011        inject 1 milliliter

 (1,000 mcg) by intramuscular route once a month    on list already

 

                    syringe with needle 1 mL 25 gauge x 1" miscellaneous syringe    2011

                          use for injection once a month     

 

                clotrimazole 1 % topical cream    2011        apply to the affected and surrounding

 areas of skin by topical route 2 times per day in the morning and evening     

 

                Vitamin D2 50,000 unit oral capsule    2011        take 1 capsule (50,000

 unit) by oral route once weekly        generic on list

 

                Pravachol 40 mg oral tablet    2012        take 1 tablet (40 mg) by oral 

route once daily for 90 days            generic on list

 

                lithium carbonate 300 mg oral capsule    2012        take 1 capsule by oral

 route daily                            dose updated

 

                Pristiq 100 mg oral tablet extended release 24 hr                    4/10/2012       take 1 and 1/2 

tablet (150 mg) by oral route once daily    Mental Health provider

 

                Pristiq 100 mg oral tablet extended release 24 hr                    4/10/2012       take 1 and 1/2 

tablet (150 mg) by oral route once daily    Discontinued by Dr Efrain Knight at [facility]

 

                hydroxyzine HCl 50 mg oral tablet    10/16/2014      2015       take 1 tablet (50 mg) 

by oral route at bedtime                 

 

                lithium carbonate 300 mg oral capsule    2015       take 1 capsule (300

 mg) by oral route 2 for 30 days         

 

                fluconazole 100 mg oral tablet    2015       12/3/2015       take 1 tablet (100 mg) by 

oral route once a week                   

 

                ketoconazole 2 % topical cream    2015       12/3/2015       apply to the affected area(s)

 by topical route 2 times per day        

 

                prednisone 10 mg oral tablet    12/3/2015       2016        take 2 tablets (20 mg) by oral

 route once daily for 4 days 1 tablet daily for 4 days 0.5 tablet daily for 4 
days                                     

 

                triamcinolone acetonide 0.1 % topical cream    2016       apply a thin layer

 to the affected area(s) by topical route 2 times per day     

 

                Cipro 500 mg oral tablet    1/15/2017       2017       take 1 tablet (500 mg) by oral route

 every 12 hours for 10 days              







Problem List







                    Description         Status              Onset

 

                    Artificial opening status; colostomy    Active               

 

                    Bipolar disorder, unspecified    Active               

 

                    Hyperlipidemia      Active               

 

                    Peritoneal Neoplasm, Malignant    Active               

 

                    Anemia, Pernicious    Active               

 

                    Arthritis unspecified    Active               

 

                    B12 deficiency      Active               







Vital Signs







      Date    Time    BP-Sys(mm[Hg]    BP-Lynn(mm[Hg])    HR(bpm)    RR(rpm)    Temp    WT    HT    HC    BMI

                    BSA                 BMI Percentile      O2 Sat(%)

 

        2017    1:34:00 PM    118 mmHg    62 mmHg    122 bpm    18 rpm    97.8 F    161.375 lbs    

69 in                     23.83 kg/m2    1.89 m2                   96 %

 

        2017    3:05:00 PM    134 mmHg    70 mmHg    70 bpm    20 rpm    97.4 F    181 lbs    69 in

                          26.7288 kg/m    1.9992 m                 98 %

 

        2017    11:07:00 AM    124 mmHg    64 mmHg    62 bpm    17 rpm    98.2 F    181.2 lbs    69

 in                       26.76 kg/m2    2.00 m2                   98 %

 

        1/15/2017    3:34:00 PM    148 mmHg    89 mmHg    69 bpm    20 rpm    98.2 F    179 lbs    69 in

                          26.4334 kg/m    1.9882 m                 98 %

 

       2017    1:51:00 PM    160 mmHg    90 mmHg    100 bpm    20 rpm    96.5 F    179 lbs             

                                                                98 %

 

       2016    3:11:00 PM    134 mmHg    76 mmHg    80 bpm    20 rpm    98 F    163 lbs    69 in     

                24.0706 kg/m    1.8972 m                      98 %

 

        2016    2:04:00 PM    142 mmHg    86 mmHg    68 bpm    16 rpm    98.5 F    166 lbs    63 in

                          29.41 kg/m2    1.83 m2                   100 %

 

        2016    11:27:00 AM    148 mmHg    78 mmHg    90 bpm    20 rpm    98.2 F    153 lbs    69 in

                          22.5939 kg/m    1.8381 m                 96 %

 

        12/3/2015    9:50:00 AM    132 mmHg    70 mmHg    62 bpm    16 rpm    97.9 F    145 lbs    69 in

                          21.41 kg/m2    1.79 m2                   100 %

 

        2015    8:52:00 AM    132 mmHg    68 mmHg    52 bpm    20 rpm    97.8 F    141 lbs    69 in

                          20.8218 kg/m    1.7645 m                 100 %

 

        2015    3:25:00 PM    120 mmHg    62 mmHg    72 bpm    16 rpm    98.1 F    136 lbs    69 in

                          20.08 kg/m2    1.73 m2                   98 %

 

       3/23/2015    2:55:00 PM    130 mmHg    76 mmHg    68 bpm    18 rpm    97 F    140 lbs    69 in    

                20.6742 kg/m    1.7583 m                      98 %

 

        10/16/2014    11:11:00 AM    120 mmHg    66 mmHg    77 bpm    20 rpm    98 F    130 lbs    69 in

                          19.20 kg/m2    1.69 m2                   100 %

 

        2014    3:21:00 PM    130 mmHg    66 mmHg    63 bpm    18 rpm    97.2 F    160 lbs    69 in

                          23.6276 kg/m    1.8797 m                 99 %

 

        2013    10:35:00 AM    132 mmHg    70 mmHg    66 bpm    20 rpm    98.1 F    157 lbs    69 in

                          23.18 kg/m2    1.86 m2                    

 

        2013    1:29:00 PM    132 mmHg    70 mmHg    76 bpm    18 rpm    98.2 F    166 lbs    69 in 

                          24.5137 kg/m    1.9146 m                  

 

       2013    2:46:00 PM    128 mmHg    70 mmHg    76 bpm    16 rpm    98 F    160 lbs    69 in     

                23.63 kg/m2     1.88 m2                          

 

        2011    8:49:00 AM    128 mmHg    78 mmHg    70 bpm    18 rpm    97.9 F    164 lbs    69 in

                          24.2183 kg/m    1.903 m                  

 

     2011    1:31:00 PM    132 mmHg    68 mmHg    84 bpm         97 F    167 lbs                        

                                         

 

        2011    9:09:00 AM    128 mmHg    70 mmHg    72 bpm    18 rpm    98.2 F    163 lbs    64 in 

                          27.9786 kg/m    1.8272 m                  

 

       2011    10:01:00 AM    132 mmHg    70 mmHg    72 bpm    18 rpm    98.2 F    154 lbs             

                                                                 

 

       2011    2:47:00 PM    128 mmHg    70 mmHg    72 bpm    18 rpm    97.8 F    156 lbs             

                                                                 

 

       5/10/2011    3:16:00 PM    144 mmHg    80 mmHg    72 bpm    18 rpm    98.2 F    158 lbs             

                                                                 

 

        2011    10:11:00 AM    132 mmHg    70 mmHg    70 bpm    18 rpm    98.2 F    168 lbs    69 in

                          24.809 kg/m    1.9261 m                  

 

        4/15/2011    10:52:00 AM    110 mmHg    60 mmHg    75 bpm    16 rpm    97.5 F    172.375 lbs    

69 in                     25.46 kg/m2    1.95 m2                   100 %

 

        2011    11:43:00 AM    120 mmHg    82 mmHg    75 bpm    16 rpm    97.2 F    178.5 lbs    69

 in                       26.3596 kg/m    1.9854 m                 100 %

 

        10/15/2010    1:32:00 PM    120 mmHg    70 mmHg    80 bpm    18 rpm    96.6 F    177 lbs    69 in

                          26.14 kg/m2    1.98 m2                   100 %

 

        2010    3:50:00 PM    168 mmHg    100 mmHg    82 bpm    18 rpm    97.8 F    177.5 lbs    69

 in                       26.2119 kg/m    1.9798 m                 97 %

 

        2010    1:21:00 PM    140 mmHg    80 mmHg    59 bpm    16 rpm    97.6 F    173.25 lbs    69 

in                        25.58 kg/m2    1.96 m2                   100 %

 

        2010    3:02:00 PM    140 mmHg    80 mmHg    61 bpm    16 rpm    97.6 F    173.125 lbs    69

 in                       25.5658 kg/m    1.9553 m                 99 %

 

        2010    1:23:00 PM    130 mmHg    80 mmHg    66 bpm    16 rpm    96.8 F    173 lbs    69 in 

                          25.55 kg/m2    1.95 m2                   100 %

 

        2010    12:58:00 PM    130 mmHg    88 mmHg    75 bpm    16 rpm    98.4 F    172.25 lbs    69

 in                       25.4366 kg/m    1.9503 m                 100 %







Social History







                    Name                Description         Comments

 

                    denies alcohol use                         

 

                    denies smoking                           

 

                    Denies illicit substance abuse                         

 

                    retired                                 direct care

 

                    Single                                   

 

                    Exercises regularly                         

 

                    Attended some college                         

 

                    Tobacco             Never smoker         







History of Procedures







                    Date Ordered        Description         Order Status

 

                    2010 12:00 AM    COMPREHEN METABOLIC PANEL    Reviewed

 

                    2010 12:00 AM    COMPLETE CBC W/AUTO DIFF WBC    Reviewed

 

                    2010 12:00 AM    LIPID PANEL         Reviewed

 

                          2015 12:00 AM        B12 Injection, Up to 1000 Mcg NDC#8567-1047-18 [facility] 

                                        Reviewed

 

                    2011 12:00 AM    MAMMOGRAM SCREENING    Reviewed

 

                    2011 12:00 AM    CYTOPATH C/V THIN LAYER    Reviewed

 

                    2011 12:00 AM    B12 Injection 1 cc NDC#[address]-11    Reviewed

 

                    2015 12:00 AM    THER/PROPH/DIAG INJ SC/IM    Reviewed

 

                    2015 12:00 AM    B12 Injection, Up to 1000 Mcg NDC#5392-3189-44    Reviewed

 

                    2011 12:00 AM    THER/PROPH/DIAG INJ SC/IM    Reviewed

 

                    2011 12:00 AM    B12 Injection([facility]) Ndc#9439-3265-09-    Reviewed

 

                    2015 12:00 AM    THER/PROPH/DIAG INJ SC/IM    Reviewed

 

                    2015 12:00 AM    B12 Injection, Up to 1000 Mcg NDC#5101-0938-10    Reviewed

 

                    10/20/2011 12:00 AM    THER/PROPH/DIAG INJ SC/IM    Reviewed

 

                    10/20/2011 12:00 AM    B12 Injection([place]) Ndc#5227-8803-20-    Reviewed

 

                    2016 12:00 AM    THER/PROPH/DIAG INJ SC/IM    Reviewed

 

                    2016 12:00 AM    B12 Injection, Up to 1000 Mcg NDC#0653-0008-13    Reviewed

 

                    3/14/2016 12:00 AM    VITAMIN B-12        Reviewed

 

                    3/15/2016 12:00 AM    THER/PROPH/DIAG INJ SC/IM    Reviewed

 

                    3/15/2016 12:00 AM    B12 Injection, Up to 1000 Mcg NDC#4454-9315-91    Reviewed

 

                    2011 12:00 AM    ***Immunization administration, Medicare flu    Reviewed

 

                    2011 12:00 AM    Fluzone ** MEDICARE Only **    Reviewed

 

                    2011 12:00 AM    THER/PROPH/DIAG INJ SC/IM    Reviewed

 

                    2011 12:00 AM    B12 Injection ([employer]) Ndc#5383-3154-93    Reviewed

 

                    2016 12:00 AM    B12 Injection, Up to 1000 Mcg NDC#9323-1059-23 [facility]    

Reviewed

 

                    2016 12:00 AM    TTE W/DOPPLER COMPLETE    Reviewed

 

                    2016 12:00 AM    EXTREMITY STUDY     Reviewed

 

                          2016 12:00 AM        B12 Injection, Up to 1000 Mcg NDC#0828-5523-31 [facility] 

                                        Reviewed

 

                    2016 12:00 AM    THER/PROPH/DIAG INJ SC/IM    Reviewed

 

                    2016 12:00 AM    B12 Injection, Up to 1000 Mcg NDC#5510-5270-65    Reviewed

 

                    2016 12:00 AM    THER/PROPH/DIAG INJ SC/IM    Reviewed

 

                    2012 12:00 AM    B12 Injection([place]) Ndc#9365-5924-83-    Reviewed

 

                    2016 12:00 AM    B12 Injection, Up to 1000 Mcg NDC#3716-5972-12    Reviewed

 

                    2016 12:00 AM    THER/PROPH/DIAG INJ SC/IM    Reviewed

 

                    2012 12:00 AM    THER/PROPH/DIAG INJ SC/IM    Reviewed

 

                    2012 12:00 AM    B12 Injection ([employer]) Ndc#0846-8978-93    Reviewed

 

                    2016 12:00 AM    THER/PROPH/DIAG INJ SC/IM    Reviewed

 

                    2016 12:00 AM    B12 Injection, Up to 1000 Mcg NDC#8619-6028-03    Reviewed

 

                    2016 12:00 AM    B12 Injection, Up to 1000 Mcg NDC#1070-6845-66    Reviewed

 

                    2016 12:00 AM    THER/PROPH/DIAG INJ SC/IM    Reviewed

 

                    2012 12:00 AM    THER/PROPH/DIAG INJ SC/IM    Reviewed

 

                    2012 12:00 AM    B12 Injection([place]) Ndc#7938-1448-79-    Reviewed

 

                    12/15/2016 12:00 AM    B12 Injection, Up to 1000 Mcg NDC#3237-9561-15    Reviewed

 

                    12/15/2016 12:00 AM    THER/PROPH/DIAG INJ SC/IM    Reviewed

 

                    2016 12:00 AM    URNLS DIP STICK/TABLET RGNT AUTO W/O MICROSCOPY    Reviewed

 

                    1/3/2017 12:00 AM    URNLS DIP STICK/TABLET RGNT AUTO W/O MICROSCOPY    Reviewed

 

                    2017 12:00 AM    URINE CULTURE/COLONY COUNT    Reviewed

 

                    2017 12:00 AM    Rocephin 1 gram Injection, [facility]    Reviewed

 

                    2017 12:00 AM    THERAPEUTIC PROPHYLACTIC/DX INJECTION SUBQ/IM    Reviewed

 

                    2017 12:00 AM    B12 1000mcg Injection, [facility]    Reviewed

 

                    5/3/2012 12:00 AM    THER/PROPH/DIAG INJ SC/IM    Reviewed

 

                    5/3/2012 12:00 AM    B12 Injection([place]) Ndc#5416-8060-69-    Reviewed

 

                    2017 12:00 AM    THERAPEUTIC PROPHYLACTIC/DX INJECTION SUBQ/IM    Reviewed

 

                    2017 12:00 AM    B12 1000mcg Injection, [facility]    Reviewed

 

                    2017 12:00 AM    MRI BRAIN STEM W/O & W/DYE    Reviewed

 

                    2017 12:00 AM    VITAMIN B-12        Reviewed

 

                    2017 12:00 AM    Speech Therapy Consult    Reviewed

 

                    2017 12:00 AM    ASSAY OF CREATININE    Reviewed

 

                    2012 12:00 AM    IMMUNOTHERAPY INJECTIONS    Reviewed

 

                    2012 12:00 AM    B12 Injection([place]) Ndc#3255-4210-69-    Reviewed

 

                    2017 12:00 AM    URINALYSIS AUTO W/SCOPE    Reviewed

 

                    2012 12:00 AM    THER/PROPH/DIAG INJ SC/IM    Reviewed

 

                    2012 12:00 AM    B12 Injection, Up to 1000 Mcg NDC#5436-8119-85    Reviewed

 

                    2017 12:00 AM    URINALYSIS AUTO W/SCOPE    Reviewed

 

                    2017 2:18 PM    URINALYSIS AUTO W/O SCOPE    Reviewed

 

                    2017 12:00 AM    URINE CULTURE/COLONY COUNT    Reviewed

 

                    2017 12:00 AM    B12 1000mcg Injection    Reviewed

 

                    2017 12:00 AM    URNLS DIP STICK/TABLET RGNT AUTO W/O MICROSCOPY    Reviewed

 

                    2017 12:00 AM    METABOLIC PANEL TOTAL CA    Reviewed

 

                    2017 12:00 AM    URNLS DIP STICK/TABLET RGNT AUTO W/O MICROSCOPY    Reviewed

 

                    2012 12:00 AM    THER/PROPH/DIAG INJ SC/IM    Reviewed

 

                    2012 12:00 AM    B12 Injection, Up to 1000 Mcg NDC#0474-4777-49    Reviewed

 

                    2012 12:00 AM    THER/PROPH/DIAG INJ SC/IM    Reviewed

 

                    2012 12:00 AM    B12 Injection, Up to 1000 Mcg NDC#1712-3639-82    Reviewed

 

                    10/16/2012 12:00 AM    THER/PROPH/DIAG INJ SC/IM    Reviewed

 

                    10/16/2012 12:00 AM    B12 Injection, Up to 1000 Mcg NDC#6783-8822-54    Reviewed

 

                    2010 12:00 AM    COMPREHEN METABOLIC PANEL    Reviewed

 

                    2010 12:00 AM    COMPLETE CBC W/AUTO DIFF WBC    Reviewed

 

                    2010 12:00 AM    LIPID PANEL         Reviewed

 

                    2013 12:00 AM    Flu Injection 3 Years And Above NDC# [address]-13  [facility]    Reviewed



 

                    2013 12:00 AM    COMPLETE CBC W/AUTO DIFF WBC    Reviewed

 

                    2013 12:00 AM    ASSAY OF LITHIUM    Reviewed

 

                    2013 12:00 AM    METABOLIC PANEL TOTAL CA    Reviewed

 

                    4/3/2013 12:00 AM    THER/PROPH/DIAG INJ SC/IM    Reviewed

 

                    4/3/2013 12:00 AM    B12 Injection, Up to 1000 Mcg NDC#6785-2365-49    Reviewed

 

                    2013 12:00 AM    THER/PROPH/DIAG INJ SC/IM    Reviewed

 

                    2013 12:00 AM    B12 Injection, Up to 1000 Mcg NDC#4133-1519-89    Reviewed

 

                    2013 12:00 AM    THER/PROPH/DIAG INJ SC/IM    Reviewed

 

                    2013 12:00 AM    B12 Injection, Up to 1000 Mcg NDC#2367-7122-14    Reviewed

 

                    2013 12:00 AM    LIPID PANEL         Reviewed

 

                    2013 12:00 AM    VITAMIN D 25 HYDROXY    Reviewed

 

                    2013 12:00 AM    THER/PROPH/DIAG INJ SC/IM    Reviewed

 

                    2013 12:00 AM    B12 Injection, Up to 1000 Mcg NDC#8255-8427-23    Reviewed

 

                    2013 12:00 AM    THER/PROPH/DIAG INJ SC/IM    Reviewed

 

                    3/6/2014 12:00 AM    THER/PROPH/DIAG INJ SC/IM    Reviewed

 

                    2014 12:00 AM    THER/PROPH/DIAG INJ SC/IM    Reviewed

 

                    2014 12:00 AM    B12 Injection, Up to 1000 Mcg NDC#6388-8621-68    Reviewed

 

                    2010 12:00 AM    SKIN FUNGI CULTURE    Reviewed

 

                    10/9/2010 12:00 AM    COMPREHEN METABOLIC PANEL    Reviewed

 

                    10/9/2010 12:00 AM    LIPID PANEL         Reviewed

 

                    2010 12:00 AM    THER/PROPH/DIAG INJ SC/IM    Reviewed

 

                    2010 12:00 AM    B12 Injection Ndc#[address]-55 ([place])    Reviewed

 

                    2010 12:00 AM    THER/PROPH/DIAG INJ SC/IM    Reviewed

 

                    2010 12:00 AM    Kenalog 40 Mg Im-Ndc#[address]-11 ([place])    Reviewed

 

                    10/15/2010 12:00 AM    FLU VACCINE 3 YRS & > IM    Reviewed

 

                    10/15/2010 12:00 AM    Admin.Of M/C Cov.Vaccine-Flu Vacc.    Reviewed

 

                    1/15/2011 12:00 AM    COMPLETE CBC W/AUTO DIFF WBC    Reviewed

 

                    1/15/2011 12:00 AM    COMPREHEN METABOLIC PANEL    Reviewed

 

                    1/15/2011 12:00 AM    LIPID PANEL         Reviewed

 

                    2014 12:00 AM    MAMMOGRAM SCREENING    Reviewed

 

                    2014 12:00 AM    Screening mammography, bilateral    Reviewed

 

                    7/10/2014 12:00 AM    THER/PROPH/DIAG INJ SC/IM    Reviewed

 

                    7/10/2014 12:00 AM    B12 Injection, Up to 1000 Mcg NDC#3832-3045-39    Reviewed

 

                    2011 12:00 AM    COMPLETE CBC W/AUTO DIFF WBC    Reviewed

 

                    2011 12:00 AM    COMPREHEN METABOLIC PANEL    Reviewed

 

                    2011 12:00 AM    LIPID PANEL         Reviewed

 

                    2014 12:00 AM    B12 Injection, Up to 1000 Mcg NDC#9873-5833-62    Reviewed

 

                    10/19/2014 12:00 AM    MAMMOGRAM SCREENING    Reviewed

 

                    10/19/2014 12:00 AM    Screening mammography, bilateral    Reviewed

 

                    10/16/2014 12:00 AM    B12 Injection, Up to 1000 Mcg NDC#7674-6397-83    Reviewed

 

                    10/16/2014 12:00 AM    COMPLETE CBC W/AUTO DIFF WBC    Reviewed

 

                    10/16/2014 12:00 AM    COMPREHEN METABOLIC PANEL    Reviewed

 

                    10/16/2014 12:00 AM    IMMUNOASSAY TUMOR     Reviewed

 

                    10/16/2014 12:00 AM    LIPID PANEL         Reviewed

 

                    10/16/2014 12:00 AM    ASSAY OF LITHIUM    Reviewed

 

                    10/16/2014 12:00 AM    MAMMOGRAM SCREENING    Reviewed

 

                    2011 12:00 AM    ASSAY OF PARATHORMONE    Reviewed

 

                    2011 12:00 AM    VITAMIN D 25 HYDROXY    Reviewed

 

                    2011 12:00 AM    ASSAY OF LITHIUM    Reviewed

 

                    2011 12:00 AM    METABOLIC PANEL TOTAL CA    Reviewed

 

                    2011 12:00 AM    CT HEAD/BRAIN W/O & W/DYE    Reviewed

 

                    3/23/2015 12:00 AM    PNEUMOCOCCAL VACC 13 GLENDY IM    Reviewed

 

                    3/23/2015 12:00 AM    Vitamin B12 injection    Reviewed

 

                    2011 12:00 AM    ASSAY OF LITHIUM    Reviewed

 

                    2011 12:00 AM    B12 Injection Ndc#[address]-71  [place]    Reviewed

 

                    2015 12:00 AM    THER/PROPH/DIAG INJ SC/IM    Reviewed

 

                    2015 12:00 AM    B12 Injection, Up to 1000 Mcg NDC#6823-1614-92    Reviewed

 

                    2015 12:00 AM    COMPLETE CBC W/AUTO DIFF WBC    Reviewed

 

                    2015 12:00 AM    COMPREHEN METABOLIC PANEL    Reviewed

 

                    2015 12:00 AM    LIPID PANEL         Reviewed

 

                    2015 12:00 AM    ASSAY OF LITHIUM    Reviewed

 

                    2011 12:00 AM    VIT D 1 25-DIHYDROXY    Reviewed

 

                    2011 12:00 AM    VITAMIN B-12        Reviewed

 

                    2015 12:00 AM    B12 Injection, Up to 1000 Mcg NDC#3504-7329-83    Reviewed

 

                    2015 12:00 AM    THER/PROPH/DIAG INJ SC/IM    Reviewed

 

                    2015 12:00 AM    B12 Injection, Up to 1000 Mcg NDC#8922-1210-33    Reviewed

 

                    2011 12:00 AM    THER/PROPH/DIAG INJ SC/IM    Reviewed

 

                    2011 12:00 AM    B12 Injection ([employer]) Ndc#9365-0952-49    Reviewed

 

                    2015 12:00 AM    THER/PROPH/DIAG INJ SC/IM    Reviewed

 

                    2015 12:00 AM    B12 Injection, Up to 1000 Mcg NDC#3589-0148-51    Reviewed







Results Summary







                          Date and Description      Results

 

                          2004 12:00 AM        Colonoscopy-Women and Men over 50 Normal 

 

                          2008 12:00 AM         Pap Smear Declined 

 

                          10/7/2009 12:00 AM        Cholest Cry Stone Ql .0 %LDLc SerPl-mCnc 123.0 mg/dLHDLc

 SerPl-mCnc 34.0 mg/dLTrigl SerPl-mCnc 190.0 mg/dLGlucose SerPl-mCnc 78.0 mg/dL

 

                          2009 12:00 AM        Mammogram -Women over 40 Normal HIV1+2 Ab Ser Ql no risk 

 

                          2010 8:47 AM         Dexa Bone Scan Refused Aspirin reccommended Contraindication 



 

                          2010 8:48 AM         Depression Done 

 

                          2010 12:00 AM         Foot Exam-Diabetic Done 

 

                          2010 12:00 AM         Cholest Cry Stone Ql .0 %LDLc SerPl-mCnc 126.0 mg/dLGlucose

 SerPl-mCnc 102.0 mg/dL

 

                          2010 8:45 AM          TRIGLYCERIDES 122.0 mg/dLCHOLESTEROL 186.0 mg/dLHDL 36.0 mg/dLTOT

 CHOL/HDL 5.2 LDL (CALC) 126.0 mg/dLGLUCOSE 102.0 mg/dLSODIUM 143.0 
mmol/LPOTASSIUM 3.70 mmol/LCHLORIDE 111.0 mmol/LCO2 23.0 mmol/LBUN 10.0 
mg/dLCREATININE 0.80 mg/dLSGOT/AST 12.0 IU/LSGPT/ALT 11.0 IU/LALK PHOS 65.0 
IU/LTOTAL PROTEIN 7.20 g/dLALBUMIN 3.90 g/dLTOTAL BILI 0.50 mg/dLCALCIUM 10.20 
mg/dLAGE 59 GFR NonAA 73 GFR AA 88 eGFR >60 mL/min/1.73 m2eGFR AA* >60 WBC 5.7 
RBC 3.26 HGB 10.60 g/dLHCT 31.70 %MCV 97.0 fLMCH 32.50 pgMCHC 33.40 g/dLRDW SD 
47 RDW CV 13.30 %MPV 9.70 fLPLT 287 NRBC# 0.00 NRBC% 0.0 %NEUT 62.90 %%LYMP 
21.80 %%MONO 9.90 %%EOS 5.0 %%BASO 0.40 %#NEUT 3.56 #LYMP 1.23 #MONO 0.56 #EOS 
0.28 #BASO 0.02 MANUAL DIFF NOT IND 

 

                          2010 12:00 AM        Glucose SerPl-mCnc 96.0 mg/dLCholest Cry Stone Ql .0 %LDLc

 SerPl-mCnc 146.0 mg/dL

 

                          2010 8:26 AM         TRIGLYCERIDES 106.0 mg/dLCHOLESTEROL 199.0 mg/dLHDL 32.0 mg/dLTOT

 CHOL/HDL 6.2 LDL (CALC) 146.0 mg/dLGLUCOSE 96.0 mg/dLSODIUM 143.0 
mmol/LPOTASSIUM 4.0 mmol/LCHLORIDE 113.0 mmol/LCO2 24.0 mmol/LBUN 13.0 
mg/dLCREATININE 1.0 mg/dLSGOT/AST 11.0 IU/LSGPT/ALT 6.0 IU/LALK PHOS 56.0 
IU/LTOTAL PROTEIN 6.60 g/dLALBUMIN 3.80 g/dLTOTAL BILI 0.50 mg/dLCALCIUM 9.30 
mg/dLAGE 59 GFR NonAA 57 GFR AA 69 eGFR 57 eGFR AA* >60 

 

                          10/6/2010 12:00 AM        Cholest Cry Stone Ql .0 %LDLc SerPl-mCnc 111.0 mg/dLGlucose

 SerPl-mCnc 81.0 mg/dL

 

                          10/6/2010 2:45 PM         TRIGLYCERIDES 123.0 mg/dLCHOLESTEROL 178.0 mg/dLHDL 42.0 mg/dLTOT

 CHOL/HDL 4.2 LDL (CALC) 111.0 mg/dLGLUCOSE 81.0 mg/dLSODIUM 139.0 
mmol/LPOTASSIUM 4.10 mmol/LCHLORIDE 106.0 mmol/LCO2 24.0 mmol/LBUN 13.0 
mg/dLCREATININE 0.90 mg/dLSGOT/AST 13.0 IU/LSGPT/ALT 11.0 IU/LALK PHOS 61.0 
IU/LTOTAL PROTEIN 7.10 g/dLALBUMIN 3.90 g/dLTOTAL BILI 0.30 mg/dLCALCIUM 9.30 
mg/dLAGE 60 GFR NonAA 64 GFR AA 78 eGFR >60 mL/min/1.73 m2eGFR AA* >60 WBC 6.9 
RBC 3.59 HGB 11.50 g/dLHCT 35.30 %MCV 98.0 fLMCH 32.0 pgMCHC 32.60 g/dLRDW SD 46
 RDW CV 12.90 %MPV 9.90 fLPLT 311 NRBC# 0.00 NRBC% 0.0 %NEUT 64.90 %%LYMP 22.50 
%%MONO 7.20 %%EOS 5.10 %%BASO 0.30 %#NEUT 4.45 #LYMP 1.54 #MONO 0.49 #EOS 0.35 
#BASO 0.02 MANUAL DIFF NOT IND 

 

                          2011 12:00 AM         Mammogram -Women over 40 Ordered 

 

                          2011 10:25 AM        TRIGLYCERIDES 111.0 mg/dLCHOLESTEROL 195.0 mg/dLHDL 43.0 mg/dLTOT

 CHOL/HDL 4.5 LDL (CALC) 130.0 mg/dLWBC 5.3 RBC 3.76 HGB 12.0 g/dLHCT 37.80 %MCV
 101.0 fLMCH 31.90 pgMCHC 31.70 g/dLRDW SD 47 RDW CV 13.0 %MPV 9.70 fLPLT 259 
NRBC# 0.00 NRBC% 0.0 %NEUT 69.0 %%LYMP 17.60 %%MONO 8.30 %%EOS 4.70 %%BASO 0.40 
%#NEUT 3.63 #LYMP 0.93 #MONO 0.44 #EOS 0.25 #BASO 0.02 MANUAL DIFF NOT IND 
GLUCOSE 102.0 mg/dLSODIUM 146.0 mmol/LPOTASSIUM 4.20 mmol/LCHLORIDE 113.0 
mmol/LCO2 23.0 mmol/LBUN 15.0 mg/dLCREATININE 1.0 mg/dLSGOT/AST 12.0 
IU/LSGPT/ALT 17.0 IU/LALK PHOS 60.0 IU/LTOTAL PROTEIN 6.90 g/dLALBUMIN 4.20 
g/dLTOTAL BILI 0.40 mg/dLCALCIUM 9.70 mg/dLAGE 60 GFR NonAA 57 GFR AA 69 eGFR 57
 eGFR AA* >60 

 

                          2011 11:49 AM        Cholest Cry Stone Ql .0 %LDLc SerPl-mCnc 130.0 mg/dLHDLc

 SerPl-mCnc 43.0 mg/dLTrigl SerPl-mCnc 111.0 mg/dLGlucose SerPl-mCnc 102.0 mg/dL

 

                          2011 11:52 AM        Pap Smear Declined 

 

                          2011 11:28 AM        Lithium 2.080 mmol/LGLUCOSE 102.0 mg/dLSODIUM 135.0 mmol/LPOTASSIUM

 3.90 mmol/LCHLORIDE 106.0 mmol/LCO2 21.0 mmol/LBUN 12.0 mg/dLCREATININE 1.30 
mg/dLCALCIUM 10.70 mg/dLAGE 60 GFR NonAA 42 GFR AA 51 eGFR 42 eGFR AA* 51 

 

                          2011 8:58 AM          Lithium 0.690 mmol/L

 

                          2011 2:38 PM         VITAMIN B12 3483.0 pg/mL

 

                          2013 3:35 PM          WBC 5.1 RBC 3.73 HGB 11.70 g/dLHCT 36.40 %MCV 98.0 fLMCH 31.40

 pgMCHC 32.10 g/dLRDW SD 47 RDW CV 13.10 %MPV 9.80 fLPLT 224 NRBC# 0.00 NRBC% 
0.0 %NEUT 66.80 %%LYMP 19.10 %%MONO 9.0 %%EOS 4.90 %%BASO 0.20 %#NEUT 3.42 #LYMP
 0.98 #MONO 0.46 #EOS 0.25 #BASO 0.01 MANUAL DIFF NOT IND GLUCOSE 88.0 
mg/dLSODIUM 141.0 mmol/LPOTASSIUM 4.10 mmol/LCHLORIDE 110.0 mmol/LCO2 22.0 
mmol/LBUN 22.0 mg/dLCREATININE 1.10 mg/dLCALCIUM 9.80 mg/dLAGE 62 GFR NonAA 50 
GFR AA 61 eGFR 50 eGFR AA* 60 Lithium 0.760 mmol/L

 

                          2013 11:02 AM        TRIGLYCERIDES 106.0 mg/dLCHOLESTEROL 181.0 mg/dLHDL 46.0 mg/dLTOT

 CHOL/HDL 3.9 LDL (CALC) 114.0 mg/dLVITAMIN D 41.10 ng/mL

 

                          10/17/2014 10:10 AM       WBC 5.0 RBC 3.66 HGB 11.60 g/dLHCT 36.80 %.0 fLMCH 31.70

 pgMCHC 31.50 g/dLRDW SD 50 RDW CV 13.50 %MPV 10.10 fLPLT 209 NRBC# 0.00 NRBC% 
0.0 %NEUT 69.20 %%LYMP 21.0 %%MONO 6.40 %%EOS 3.20 %%BASO 0.20 %#NEUT 3.46 #LYMP
 1.05 #MONO 0.32 #EOS 0.16 #BASO 0.01 MANUAL DIFF NOT IND GLUCOSE 100.0 
mg/dLSODIUM 148.0 mmol/LPOTASSIUM 3.90 mmol/LCHLORIDE 114.0 mmol/LCO2 26.0 
mmol/LBUN 12.0 mg/dLCREATININE 1.20 mg/dLSGOT/AST 9.0 IU/LSGPT/ALT <6 IU/LALK 
PHOS 82.0 IU/LTOTAL PROTEIN 6.90 g/dLALBUMIN 4.0 g/dLTOTAL BILI 0.40 
mg/dLCALCIUM 10.50 mg/dLAGE 64 GFR NonAA 45 GFR AA 55 eGFR 45 eGFR AA* 55 
TRIGLYCERIDES 96.0 mg/dLCHOLESTEROL 155.0 mg/dLHDL 38.0 mg/dLTOT CHOL/HDL 4.1 
LDL (CALC) 98.0 mg/dLLithium 0.850 mmol/LCancer Antigen (CA) 125 8.30 U/mL

 

                          2015 10:25 AM        Lithium 0.790 mmol/LWBC 4.8 RBC 3.44 HGB 11.0 g/dLHCT 35.20 

%.0 fLMCH 32.0 pgMCHC 31.30 g/dLRDW SD 53 RDW CV 14.0 %MPV 9.30 fLPLT 210
 NRBC# 0.00 NRBC% 0.0 %NEUT 70.80 %%LYMP 17.20 %%MONO 8.10 %%EOS 3.50 %%BASO 
0.40 %#NEUT 3.41 #LYMP 0.83 #MONO 0.39 #EOS 0.17 #BASO 0.02 MANUAL DIFF NOT IND 
TRIGLYCERIDES 107.0 mg/dLCHOLESTEROL 174.0 mg/dLHDL 43.0 mg/dLTOT CHOL/HDL 4.0 
LDL (CALC) 110.0 mg/dLGLUCOSE 90.0 mg/dLSODIUM 145.0 mmol/LPOTASSIUM 3.80 
mmol/LCHLORIDE 115.0 mmol/LCO2 24.0 mmol/LBUN 17.0 mg/dLCREATININE 1.30 
mg/dLSGOT/AST 18.0 IU/LSGPT/ALT 17.0 IU/LALK PHOS 56.0 IU/LTOTAL PROTEIN 6.70 
g/dLALBUMIN 3.90 g/dLTOTAL BILI 0.40 mg/dLCALCIUM 9.80 mg/dLAGE 64 GFR NonAA 41 
GFR AA 50 eGFR 41 eGFR AA* 50 

 

                          2015 8:50 AM        WBC 5.8 RBC 3.29 HGB 10.70 g/dLHCT 34.0 %.0 fLMCH 32.50

 pgMCHC 31.50 g/dLRDW SD 52 RDW CV 13.60 %MPV 9.60 fLPLT 223 NRBC# 0.00 NRBC% 
0.0 %NEUT 69.60 %%LYMP 18.90 %%MONO 8.50 %%EOS 2.80 %%BASO 0.20 %#NEUT 4.03 
#LYMP 1.09 #MONO 0.49 #EOS 0.16 #BASO 0.01 MANUAL DIFF NOT IND Lithium 0.620 
mmol/LGLUCOSE 83.0 mg/dLSODIUM 139.0 mmol/LPOTASSIUM 3.90 mmol/LCHLORIDE 109.0 
mmol/LCO2 22.0 mmol/LBUN 19.0 mg/dLCREATININE 1.40 mg/dLSGOT/AST 19.0 
IU/LSGPT/ALT 21.0 IU/LALK PHOS 55.0 IU/LTOTAL PROTEIN 6.50 g/dLALBUMIN 3.90 
g/dLTOTAL BILI 0.50 mg/dLCALCIUM 9.60 mg/dLAGE 65 GFR NonAA 38 GFR AA 46 eGFR 38
 eGFR AA* 46 TRIGLYCERIDES 121.0 mg/dLCHOLESTEROL 192.0 mg/dLHDL 51.0 mg/dLTOT 
CHOL/HDL 3.8 .0 mg/dLFREE T4 0.79 TSH 1.210 uIU/mLHemoglobin A1c 5.40 
%Estim. Avg Glu (eAG) 108 

 

                          3/15/2016 8:08 AM         VITAMIN B12 696.0 pg/mL

 

                          3/23/2016 8:26 AM         WBC 7.0 RBC 3.61 HGB 11.80 g/dLHCT 37.70 %.0 fLMCH 32.70

 pgMCHC 31.30 g/dLRDW SD 49 RDW CV 12.50 %MPV 10.0 fLPLT 207 NRBC# 0.00 NRBC% 
0.0 %NEUT 73.60 %%LYMP 16.40 %%MONO 6.60 %%EOS 3.0 %%BASO 0.30 %#NEUT 5.15 #LYMP
 1.15 #MONO 0.46 #EOS 0.21 #BASO 0.02 MANUAL DIFF NOT IND Lithium 0.940 
mmol/LGLUCOSE 108.0 mg/dLSODIUM 143.0 mmol/LPOTASSIUM 4.30 mmol/LCHLORIDE 110.0 
mmol/LCO2 27.0 mmol/LBUN 16.0 mg/dLCREATININE 1.60 mg/dLSGOT/AST 13.0 
IU/LSGPT/ALT 7.0 IU/LALK PHOS 71.0 IU/LTOTAL PROTEIN 6.80 g/dLALBUMIN 4.0 
g/dLTOTAL BILI 0.20 mg/dLCALCIUM 10.40 mg/dLAGE 65 GFR NonAA 32 GFR AA 39 eGFR 
32 eGFR AA* 39 TRIGLYCERIDES 113.0 mg/dLCHOLESTEROL 169.0 mg/dLHDL 42.0 mg/dLTOT
 CHOL/HDL 4.0 LDL (CALC) 104.0 mg/dLFREE T4 0.86 TSH 2.20 uIU/mLHemoglobin A1c 
5.20 %Estim. Avg Glu (eAG) 103 

 

                          3/25/2016 9:17 AM         COLOR YELLOW APPEARANCE CLEAR SPEC GRAV 1.010 pH 7.0 PROTEIN 

NEGATIVE GLUCOSE NEGATIVE mg/dLKETONE NEGATIVE BILIRUBIN NEGATIVE BLOOD NEGATIVE
 NITRITE NEGATIVE [place] SCREEN SMALL MICRO IND? SEE BELOW WBC/HPF 0-5 RBC/HPF 
NEGATIVE CASTS/LPF NEGATIVE /LPFCRYSTALS NEGATIVE MUCOUS THRDS NEGATIVE BACTERIA
 NEGATIVE EPITH CELLS FEW SQUAMOUS /HPFTRICHOMONAS NEGATIVE YEAST NEGATIVE 

 

                          2016 6:00 AM        GLUCOSE 91.0 mg/dLSODIUM 143.0 mmol/LPOTASSIUM 3.60 mmol/LCHLORIDE

 112.0 mmol/LCO2 23.0 mmol/LBUN 22.0 mg/dLCREATININE 1.20 mg/dLSGOT/AST 15.0 
IU/LSGPT/ALT 12.0 IU/LALK PHOS 61.0 IU/LTOTAL PROTEIN 5.40 g/dLALBUMIN 3.10 
g/dLTOTAL BILI 0.40 mg/dLCALCIUM 8.40 mg/dLAGE 66 GFR NonAA 45 GFR AA 55 eGFR 45
 eGFR AA* 55 WBC 3.0 RBC 3.05 HGB 9.80 g/dLHCT 32.10 %.0 fLMCH 32.10 
pgMCHC 30.50 g/dLRDW SD 54 RDW CV 14.20 %MPV 10.10 fLPLT 170 NRBC# 0.00 NRBC% 
0.0 %NEUT 50.70 %%LYMP 32.60 %%MONO 10.50 %%EOS 5.90 %%BASO 0.30 %#NEUT 1.54 
#LYMP 0.99 #MONO 0.32 #EOS 0.18 #BASO 0.01 MANUAL DIFF NOT IND 

 

                          2016 2:09 PM        COLOR YELLOW APPEARANCE CLEAR SPEC GRAV 1.010 pH 5.0 PROTEIN

 30 GLUCOSE NEGATIVE mg/dLKETONE NEGATIVE BILIRUBIN NEGATIVE BLOOD LARGE NITRITE
 NEGATIVE [place] SCREEN MODERATE MICRO INDICATED? SEE BELOW WBC/HPF  RBC/HPF
 20-50 CASTS/LPF NEGATIVE /LPFCRYSTALS NEGATIVE MUCOUS THRDS NEGATIVE BACTERIA 
NEGATIVE EPITH CELLS FEW SQUAMOUS /HPFTRICHOMONAS NEGATIVE YEAST NEGATIVE CULT 
SET UP? YES 

 

                          1/3/2017 4:08 PM          COLOR YELLOW APPEARANCE HAZY SPEC GRAV 1.015 pH 6.0 PROTEIN 30

 GLUCOSE NEGATIVE mg/dLKETONE NEGATIVE BILIRUBIN NEGATIVE BLOOD MODERATE NITRITE
 NEGATIVE [place] SCREEN LARGE MICRO INDICATED? SEE BELOW WBC/-200 RBC/HPF 
5-10 CASTS/LPF NEGATIVE /LPFCRYSTALS NEGATIVE MUCOUS THRDS NEGATIVE BACTERIA 
NEGATIVE EPITH CELLS 1+ SQUAMOUS /HPFTRICHOMONAS NEGATIVE YEAST NEGATIVE CULT 
SET UP? YES 

 

                          2017 4:24 PM         CREATININE 1.50 mg/dLAGE 66 GFR NonAA 35 GFR AA 42 eGFR 35 eGFR

 AA* 42 VITAMIN B12 1324.0 pg/mL

 

                          2017 11:30 AM         GLUCOSE 93.0 mg/dLSODIUM 143.0 mmol/LPOTASSIUM 3.10 mmol/LCHLORIDE

 101.0 mmol/LCO2 29.0 mmol/LBUN 26.0 mg/dLCREATININE 1.50 mg/dLSGOT/AST 23.0 
IU/LSGPT/ALT 13.0 IU/LALK PHOS 66.0 IU/LTOTAL PROTEIN 7.70 g/dLALBUMIN 4.30 
g/dLTOTAL BILI 0.40 mg/dLCALCIUM 10.30 mg/dLAGE 66 GFR NonAA 35 GFR AA 42 eGFR 
35 eGFR AA* 42 TRIGLYCERIDES 147.0 mg/dLCHOLESTEROL 184.0 mg/dLHDL 44.0 mg/dLTOT
 CHOL/HDL 4.2 .0 mg/dLWBC 5.4 RBC 3.98 HGB 12.90 g/dLHCT 40.20 %.0
 fLMCH 32.40 pgMCHC 32.10 g/dLRDW SD 50 RDW CV 13.50 %MPV 9.30 fLPLT 210 NRBC# 
0.00 NRBC% 0.0 %NEUT 54.20 %%LYMP 30.70 %%MONO 9.10 %%EOS 5.20 %%BASO 0.40 
%#NEUT 2.94 #LYMP 1.66 #MONO 0.49 #EOS 0.28 #BASO 0.02 MANUAL DIFF NOT IND FREE 
T4 1.09 COLOR YELLOW APPEARANCE CLEAR SPEC GRAV <=1.005 pH 5.5 PROTEIN NEGATIVE 
GLUCOSE NEGATIVE mg/dLKETONE NEGATIVE BILIRUBIN NEGATIVE BLOOD NEGATIVE NITRITE 
NEGATIVE LEUK SCREEN LARGE MICRO INDICATED? SEE BELOW WBC/HPF 10-20 RBC/HPF 0-5 
CASTS/LPF NEGATIVE /LPFCRYSTALS NEGATIVE MUCOUS THRDS NEGATIVE BACTERIA FEW 
EPITH CELLS 1+ SQUAMOUS /HPFTRICHOMONAS NEGATIVE YEAST NEGATIVE CULT SET UP? YES
 TSH 1.820 uIU/mL

 

                          2017 2:45 PM         COLOR YELLOW APPEARANCE CLEAR SPEC GRAV <=1.005 pH 6.0 PROTEIN

 NEGATIVE GLUCOSE NEGATIVE mg/dLKETONE NEGATIVE BILIRUBIN NEGATIVE BLOOD TRACE-
INTACT NITRITE NEGATIVE LE[place] SCREEN SMALL MICRO INDICATED? SEE BELOW WBC/HPF 0-5
 RBC/HPF NEGATIVE CASTS/LPF NEGATIVE /LPFCRYSTALS NEGATIVE MUCOUS THRDS NEGATIVE
 BACTERIA FEW EPITH CELLS FEW SQUAMOUS /HPFTRICHOMONAS NEGATIVE YEAST NEGATIVE 
CULT SET UP? YES 

 

                          2017 11:22 AM        COLOR YELLOW APPEARANCE CLEAR SPEC GRAV <=1.005 pH 6.5 PROTEIN

 NEGATIVE GLUCOSE NEGATIVE mg/dLKETONE NEGATIVE BILIRUBIN NEGATIVE BLOOD 
NEGATIVE NITRITE NEGATIVE LEUK SCREEN NEGATIVE MICRO INDICATED? NOT INDICATED 

 

                          2017 2:18 PM         Clarity Ur cloudy Color Ur dark yellow Glucose Ur-sCnc negative

 Bilirub Ur Ql Strip negative Ketones Ur Ql Strip negative Sp Gr Ur Qn 1.010 Hgb
 Ur Ql Strip trace-intact pH Ur-LsCnc 6.5 Prot Ur Ql Strip trace Urobilinogen 
Ur-mCnc 0.2 Nitrite Ur Ql Strip negative WBC Est Ur Ql Strip large 

 

                          2017 9:28 AM         GLUCOSE 109.0 mg/dLSODIUM 142.0 mmol/LPOTASSIUM 3.60 mmol/LCHLORIDE

 106.0 mmol/LCO2 23.0 mmol/LBUN 11.0 mg/dLCREATININE 1.30 mg/dLCALCIUM 9.80 
mg/dLAGE 66 GFR NonAA 41 GFR AA 50 eGFR 41 eGFR AA* 50 

 

                          2017 9:52 AM         COLOR YELLOW APPEARANCE CLOUDY SPEC GRAV <=1.005 pH 6.5 PROTEIN

 NEGATIVE GLUCOSE NEGATIVE mg/dLKETONE NEGATIVE BILIRUBIN NEGATIVE BLOOD SMALL 
NITRITE POSITIVE [place] SCREEN LARGE MICRO INDICATED? SEE BELOW WBC/-300 
RBC/HPF 5-10 CASTS/LPF NEGATIVE /LPFCRYSTALS NEGATIVE MUCOUS THRDS NEGATIVE 
BACTERIA 2++ EPITH CELLS 1+ SQUAMOUS /HPFTRICHOMONAS NEGATIVE YEAST NEGATIVE 
CULT SET UP? YES 

 

                          2017 10:41 AM         COLOR YELLOW APPEARANCE CLEAR SPEC GRAV <=1.005 pH 6.0 PROTEIN

 NEGATIVE GLUCOSE NEGATIVE mg/dLKETONE NEGATIVE BILIRUBIN NEGATIVE BLOOD 
NEGATIVE NITRITE NEGATIVE LE[place] SCREEN TRACE MICRO INDICATED? SEE BELOW WBC/HPF 
0-5 RBC/HPF RARE CASTS/LPF NEGATIVE /LPFCRYSTALS NEGATIVE MUCOUS THRDS NEGATIVE 
BACTERIA FEW EPITH CELLS 1+ SQUAMOUS /HPFTRICHOMONAS NEGATIVE YEAST NEGATIVE 
CULT SET UP? NO 







History Of Immunizations







       Name    Date Admin    Mfg Name    Mfg Code    Trade Name    Lot#    Route    Inj    Vis Given    Vis

 Pub                                    CVX

 

        Influenza    2008    Not Entered    NE      Not Entered            Not Entered    Not Entered

                    1            999

 

        X       12/19/2008    [employer]    MSD     Pneumovax 23            Intramuscular    Not Entered

                    1            999

 

           Influenza    10/15/2010    [employer]    NOV        Fluvirin > 12 Years    

[id number]     Intramuscular    Left Deltoid    10/15/2010    2009    999

 

          X         3/23/2015    [facility]    [facility]       Prevnar 13    [id number]    Intramuscular

                Right Gluteous Medius    3/23/2015       2013       109







History of Past Illness







                    Name                Date of Onset       Comments

 

                    Peritoneal Neoplasm, Malignant                         

 

                    Hyperlipidemia                           

 

                    Bipolar disorder, unspecified                         

 

                    Artificial opening status; colostomy                         

 

                    B12 deficiency                           

 

                    Anemia, Pernicious                         

 

                    Arthritis unspecified                         

 

                    cervical cancer                          

 

                    Artificial opening status; colostomy    2010  1:10PM     

 

                    Bipolar disorder, unspecified    2010  1:10PM     

 

                    Hyperlipidemia      2010  1:10PM     

 

                    Anemia, Pernicious    2010  1:10PM     

 

                    Postoperative Follow-Up    2010  1:55PM     

 

                    Postoperative Follow-Up    Mar  8 2010 10:57AM     

 

                    Artificial opening status; colostomy    Mar  8 2010  1:19PM     

 

                    Peritoneal Neoplasm, Malignant    Mar  8 2010  1:19PM     

 

                    Artificial opening status; colostomy    2010  1:40PM     

 

                    Hyperlipidemia      2010  1:40PM     

 

                    Anemia, Pernicious    2010  1:40PM     

 

                    Peritoneal Neoplasm, Malignant    2010  1:40PM     

 

                    Arthritis unspecified    2010  1:40PM     

 

                    Anemia of Chronic Illness    2010  1:40PM     

 

                    Tinea corporis      2010  3:17PM     

 

                    Bipolar disorder, unspecified    2010  1:33PM     

 

                    Hyperlipidemia      2010  1:33PM     

 

                    Anemia, Pernicious    2010  1:33PM     

 

                    Peritoneal Neoplasm, Malignant    2010  1:33PM     

 

                    B12 deficiency      2010  1:33PM     

 

                    Ethmoidal Sinusitis, Acute    Sep 21 2010  3:53PM     

 

                    Wheezing            Sep 21 2010  3:53PM     

 

                    Flu                 Oct 15 2010  1:40PM     

 

                    Bipolar disorder, unspecified    Oct 15 2010  1:42PM     

 

                    Hyperlipidemia      Oct 15 2010  1:42PM     

 

                    Anemia, Pernicious    Oct 15 2010  1:42PM     

 

                    Peritoneal Neoplasm, Malignant    Oct 15 2010  1:42PM     

 

                    Bipolar disorder, unspecified    2011 12:01PM     

 

                    Hyperlipidemia      2011 12:01PM     

 

                    Anemia, Pernicious    2011 12:01PM     

 

                    Peritoneal Neoplasm, Malignant    2011 12:01PM     

 

                    Bipolar disorder, unspecified    Apr 15 2011 10:55AM     

 

                    Major Depression    2011 10:11AM     

 

                    Bipolar Disorder    2011 10:11AM     

 

                    Cancer              May 10 2011  4:16PM     

 

                    Major Depression    May 10 2011  3:16PM     

 

                    Bipolar Disorder    May 10 2011  3:16PM     

 

                    Hypercalcemia       May 23 2011  2:47PM     

 

                    Bipolar disorder, unspecified    May 23 2011  2:47PM     

 

                    Colon Cancer, Personal History    May 23 2011  2:47PM     

 

                    Bipolar Disorder    May 31 2011  4:39PM     

 

                    Depressive Disorder    2011 10:01AM     

 

                    Vitamin B12 deficiency    2011 10:01AM     

 

                    Vitamin D Deficiency    2011  5:07PM     

 

                    Anemia, Vitamin B12 Deficiency    2011  5:07PM     

 

                    B12 deficiency      2011  3:56PM     

 

                    Routine gynecological examination    Aug  4 2011  9:08AM     

 

                    Screening Examination for Breast Cancer    Aug  4 2011  9:08AM     

 

                    Tinea Corporis      Aug  4 2011  9:08AM     

 

                    Depressive Disorder    Sep 23 2011  8:47AM     

 

                    Contact Dermatitis    Sep 23 2011  8:47AM     

 

                    Anemia, Pernicious    Sep 23 2011  8:47AM     

 

                    B12 deficiency      Sep 23 2011  8:47AM     

 

                    B12 deficiency      Sep 27 2011  2:58PM     

 

                    B12 deficiency      Oct 20 2011  2:34PM     

 

                    Flu                 Dec  9 2011  3:16PM     

 

                    B12 deficiency      Dec  9 2011  3:17PM     

 

                    B12 deficiency      2012  4:52PM     

 

                    B12 deficiency      Feb 2012 11:10AM     

 

                    B12 deficiency      2012  3:37PM     

 

                    B12 deficiency      May  3 2012  4:10PM     

 

                    B12 deficiency      2012  2:54PM     

 

                    B12 deficiency      2012 11:23AM     

 

                    B12 deficiency      Aug  9 2012  2:08PM     

 

                    B12 deficiency      Sep  6 2012  4:36PM     

 

                    B12 deficiency      Oct 16 2012 10:23AM     

 

                    Flu                 Feb  4   3:11PM     

 

                    Bipolar disorder, unspecified    Feb  4   2:48PM     

 

                    Anemia, Pernicious    Feb  4   2:48PM     

 

                    B12 deficiency      Feb  4   2:48PM     

 

                    Extrapyramidal abnormal movement disorder    Feb  4   2:48PM     

 

                    B12 deficiency      Apr  3 2013 12:03PM     

 

                    Bipolar disorder, unspecified    May  7 2013  1:31PM     

 

                    Anemia, Pernicious    May  7 2013  1:31PM     

 

                    B12 deficiency      May  7 2013  1:31PM     

 

                    Extrapyramidal abnormal movement disorder    May  7 2013  1:31PM     

 

                    B12 deficiency      2013  3:42PM     

 

                    B12 deficiency      2013  1:31PM     

 

                    Hyperlipidemia      Aug  7 2013 10:37AM     

 

                    Vitamin D Deficiency    Aug  7 2013 10:37AM     

 

                    Bipolar disorder, unspecified    Aug  7 2013 10:37AM     

 

                    Anemia, Pernicious    Aug  7 2013 10:37AM     

 

                    B12 deficiency      Aug  7 2013 10:37AM     

 

                    B12 deficiency      Sep 25 2013 11:15AM     

 

                    B12 deficiency      Dec 11 2013  3:16PM     

 

                    B12 deficiency      Mar  6 2014  1:48PM     

 

                    B12 deficiency      May 21 2014  3:17PM     

 

                    Screening Examination for Breast Cancer    2014  3:23PM     

 

                    Periumbilical abdominal pain    2014  3:23PM     

 

                    B12 deficiency      Jul 10 2014  2:52PM     

 

                    Anemia, Vitamin B12 Deficiency    Aug 13 2014  4:50PM     

 

                    Bipolar disorder    Oct 16 2014 11:13AM     

 

                    Hyperlipidemia      Oct 16 2014 11:13AM     

 

                    Anemia, Pernicious    Oct 16 2014 11:13AM     

 

                    Peritoneal Neoplasm, Malignant    Oct 16 2014 11:13AM     

 

                    Screening breast examination    Oct 16 2014 11:13AM     

 

                    Weight loss         Oct 16 2014 11:13AM     

 

                    Anemia, Pernicious    Mar 23 2015  2:57PM     

 

                    B12 deficiency      Mar 23 2015  2:57PM     

 

                    Need for Prevnar vaccine    Mar 23 2015  2:57PM     

 

                    Bipolar disorder    Mar 23 2015  2:57PM     

 

                    Hyperlipidemia      Mar 23 2015  2:57PM     

 

                    Anemia, Pernicious    Mar 23 2015  2:57PM     

 

                    Peritoneal Neoplasm, Malignant    Mar 23 2015  2:57PM     

 

                    B12 deficiency      May  4 2015  4:48PM     

 

                    Hyperlipidemia      May 13 2015  9:56AM     

 

                    Anemia              May 13 2015  9:56AM     

 

                    Bipolar disorder    May 13 2015  9:56AM     

 

                    Bipolar disorder    May 14 2015  3:27PM     

 

                    Hyperlipidemia      May 14 2015  3:27PM     

 

                    Anemia, Pernicious    May 14 2015  3:27PM     

 

                    Peritoneal Neoplasm, Malignant    May 14 2015  3:27PM     

 

                    B12 deficiency      2015  2:20PM     

 

                    B12 deficiency      2015 11:34AM     

 

                    B12 deficiency      Aug 18 2015  9:06AM     

 

                    Tinea Corporis      Sep 18 2015  8:54AM     

 

                    B12 deficiency      Sep 18 2015  8:54AM     

 

                    B12 deficiency      2015 10:28AM     

 

                    Herpes zoster without complication    Dec  3 2015  9:52AM     

 

                    B12 deficiency      Dec 23 2015 11:21AM     

 

                    B12 deficiency      2016  4:51PM     

 

                    Vitamin B 12 deficiency    Mar 14 2016  5:35PM     

 

                    B12 deficiency      Mar 15 2016 12:14PM     

 

                    B12 deficiency      May  5 2016 11:30AM     

 

                    Edema               May  5 2016 11:30AM     

 

                    Dermatitis          May  5 2016 11:30AM     

 

                    Edema               May 17 2016  8:38AM     

 

                    Shortness of breath    May 17 2016  8:38AM     

 

                    Bilateral edema of lower extremity    2016  2:06PM     

 

                    B12 deficiency      2016  2:06PM     

 

                    B12 deficiency      2016 11:50AM     

 

                    B12 deficiency      2016 11:20AM     

 

                    Diarrhea            Aug  2 2016  3:13PM     

 

                    B12 deficiency      Aug 24 2016 11:10AM     

 

                    Encounter for screening mammogram for breast cancer    Aug 24 2016 11:44AM     

 

                    B12 deficiency      Sep 28 2016  2:35PM     

 

                    B12 deficiency      Dec 15 2016  2:02PM     

 

                    Dysuria             Dec 29 2016 12:14PM     

 

                    Hematuria           Fidencio  3 2017  1:33PM     

 

                    Constipation by delayed colonic transit    2017  1:52PM     

 

                    Ileus               2017  1:52PM     

 

                    UTI (urinary tract infection)    Fidencio 15 2017  3:39PM     

 

                    Acute cystitis with hematuria    2017 11:07AM     

 

                    B12 deficiency      2017 11:07AM     

 

                    B12 deficiency      2017 11:40AM     

 

                    B12 deficiency      2017  4:07PM     

 

                    Slurred speech      2017  3:07PM     

 

                    Vitamin B12 deficiency    2017  3:07PM     

 

                    Dysphagia, unspecified type    2017  3:07PM     

 

                    Hyperlipidemia      2017  3:07PM     

 

                    Dysuria             2017 12:01PM     

 

                    B12 deficiency      2017  2:08PM     

 

                    Dysuria             2017 10:58AM     

 

                    Hematuria           May 22 2017  1:36PM     

 

                    Depressive Disorder    May 22 2017  1:36PM     

 

                    Constipation        May 22 2017  1:36PM     

 

                    Fatigue             May 22 2017  1:36PM     

 

                    Urinary tract infection    May 30 2017  9:36AM     

 

                    Hypokalemia         May 30 2017 12:03PM     

 

                    Urinary tract infection    2017  4:36PM     







Payers







           Insurance Name    Company Name    Plan Name    Plan Number    Policy Number    Policy Group

 Number                                 Start Date

 

                    [facility]              [id number]              N/A

 

                          [employer]    [employer]                 [id number]

                                                    

 

                    Medicare Part A    Medicare - Lab/Xray              [id number]              2006

 

                    Medicare Part B    [place]              [id number]              2006

 

                          [employer]    [employer]                 50 percent

                                                    2009

 

                    [facility]    [employer]              [id number]              N/A

 

                    Medicare Part A    Medicare Part A              [id number]              N/A

 

                    Medicare Part A    Medicare Part A              [id number]              2006









History of Encounters







                    Visit Date          Visit Type          Provider

 

                    2017           [facility]            Pranav Angel MD

 

                    2017           Office visit        Bhupinder Aspen DO

 

                    2017           Nurse visit         Bhupinder Aspen DO

 

                    2017           Office visit         

 

                    2017           Office visit        Bhupinder Aspen DO

 

                    2017           Nurse visit         Bhupinder Aspen DO

 

                    2017           Office visit        Radha Ontiveros APRN

 

                    1/15/2017           Office visit        Aj Tapia NP

 

                    2017            Office visit        Devin Masterson MD

 

                    2016          [facility]            Devin Masterson MD

 

                    12/15/2016          Nurse visit         Bhupinder Aspen DO

 

                    2016           Nurse visit         Bret Forte APRN

 

                    2016           Nurse visit         Bhupinder Aspen DO

 

                    2016            Office visit        Bhupinder Aspen DO

 

                    2016           Nurse visit         Bhupinder Aspen DO

 

                    2016           Office visit        Bret Forte APRN

 

                    2016            Office visit        Bhupinder Aspen DO

 

                    3/15/2016           Nurse visit         Bhupinder Aspen DO

 

                    2016            Nurse visit         Bhupinder Aspen DO

 

                    2015          Nurse visit         Bhupinder Aspen DO

 

                    12/3/2015           Office visit        Bhupinder Aspen DO

 

                    2015          Nurse visit         Bhupinder Aspen DO

 

                    2015           Office visit        Bhupinder Aspen DO

 

                    2015           Nurse visit         Bhupinder Aspen DO

 

                    2015            Nurse visit         Bhupinder Aspen DO

 

                    2015            Nurse visit         Bhupinder Aspen DO

 

                    2015           Office visit        Bhupinder Aspen DO

 

                    2015            Nurse visit         Bhupinder Aspen DO

 

                    3/23/2015           Office visit        Bhupinder Aspen DO

 

                    10/16/2014          Office visit        Bhupinder Aspen DO

 

                    2014           Nurse visit         Radha Ontiveros APRN

 

                    7/10/2014           Nurse visit         Bhupinder Aspen DO

 

                    2014           Office visit        Bhupinder Aspen DO

 

                    2014           Nurse visit         Bhupinder Aspen DO

 

                    3/6/2014            Nurse visit         Bhupinder Aspen DO

 

                    2014            [facility]            EARNEST Lopez MD

 

                    2013          Nurse visit         Bhupinder Aspen DO

 

                    2013           Nurse visit         Bhupinder Aspen DO

 

                    2013            Office visit        Bhupinder Aspen DO

 

                    2013            Nurse visit         Bhupinder Aspen DO

 

                    2013            Nurse visit         Bhupinder Aspen DO

 

                    2013            Office visit        Bhupinder Aspen DO

 

                    4/3/2013            Nurse visit         Bhupinder Aspen DO

 

                    2013            Office visit        Bhupinder Aspen DO

 

                    10/16/2012          Nurse visit         Bhupinder Aspen DO

 

                    2012            Nurse visit         Bhupinder Aspen DO

 

                    2012            Voided              Bhupinder Aspen DO

 

                    2012            Nurse visit         Bhupinder Aspen DO

 

                    2012            Nurse visit         Bhupinder Aspen DO

 

                    2012           Nurse visit         Bhupinder Aspen DO

 

                    5/3/2012            Nurse visit         Bhupinder Aspen DO

 

                    2012           Nurse visit         Bhupinder Aspen DO

 

                    2012           Nurse visit         Bhupinder Aspen DO

 

                    2012           Nurse visit         Bhupinder Aspen DO

 

                    2011           Nurse visit         Bhupinder Aspen DO

 

                    10/20/2011          Nurse visit         Bhupinder Aspen DO

 

                    2011           Office visit        Bhupinder Aspen DO

 

                    2011           Nurse visit         Radha GREEN

 

                    2011            Office visit        Bhupinder Aspen DO

 

                    2011           Nurse visit         Bhupinder Aspen DO

 

                    2011            Office visit        Bhupinder Aspen DO

 

                    2011           Office visit        Bhupinder Aspen DO

 

                    5/10/2011           Office visit        Bhupinder Aspen DO

 

                    2011           Office visit        Bhupinder Aspen DO

 

                    4/15/2011           Office visit        Devin Angel DO

 

                    2011           Office visit        Devin Angel DO

 

                    10/15/2010          Office visit        Devin Angel DO

 

                    2010           Office visit        Devin Angel DO

 

                    2010            Office visit        Devin Angel DO

 

                    2010           Office visit        Devin Angel DO

 

                    2010            Office visit        Devin Angel DO

 

                    3/8/2010            Office visit        Devin Masterson MD

 

                    2010            Surgery             Devin Masterson MD

 

                    2010            Office visit        Devin Angel DO

 

                    2010           Surgery             Devin Masterson MD

 

                    2010           Hospital            Devin Masterson MD

 

                    2010           [facility]            Devin Masterson MD

 

                    10/22/2009          Office visit        Devin Angel DO

## 2019-06-26 NOTE — XMS REPORT
MU2 Ambulatory Summary

                             Created on: 2017



Pauline Gan

External Reference #: [id number]

: 1950

Sex: Female



Demographics







                          Address                   [address]

[place]  [address]

 

                          Home Phone                [phone]

 

                          Preferred Language        English

 

                          Marital Status            Legally 

 

                          Confucianism Affiliation     Unknown

 

                          Race                      White

 

                          Ethnic Group              Not  or 





Author







                          Author                    Pranav Angel

 

                          Organization              [facility]

 

                          Address                   [address]

[place]  [id number]



 

                          Phone                     [phone]







Care Team Providers







                    Care Team Member Name    Role                Phone

 

                    Pranav Angel     PCP                 Unavailable

 

                    Bhupinder Louise    PreferredProvider    Unavailable







Allergies and Adverse Reactions







                    Name                Reaction            Notes

 

                    NO KNOWN DRUG ALLERGIES                         







Plan of Treatment







             Planned Activity    Comments     Planned Date    Planned Time    Plan/Goal

 

             Injection,Subcutaneous/Intramuscul, [facility]                 2017    12:00 AM      







Medications







                                        Active 

 

             Name         Start Date    Estimated Completion Date    SIG          Comments

 

                Latuda 20 mg oral tablet                                    take 1 tablet (20 mg) by oral route once daily with

 food (at least 350 calories)            

 

             pravastatin 40 mg oral tablet    3/30/2015                 TAKE 1 TABLET BY MOUTH DAILY     

 

                Namenda XR 28 mg oral capsule,sprinkle,ER 24hr    2015                       take 1 capsule (28

 mg) by oral route once daily            

 

                Namenda XR 28 mg oral capsule,sprinkle,ER 24hr    2016                       take 1 capsule (28

 mg) by oral route once daily            

 

                potassium chloride 10 mEq oral tablet extended release    2016                       take 1 tablet

 (10 meq) by oral route once daily       

 

             pravastatin 40 mg oral tablet    2016                 TAKE 1 TABLET BY MOUTH DAILY     

 

                Vitamin B-12 1,000 mcg/mL injection solution    2016                       inject 1 milliliter 

(1,000 mcg) by intramuscular route once a month     

 

                potassium chloride 10 mEq oral tablet extended release    2016                      take 1 tablet

 (10 meq) by oral route once daily       

 

                Namenda XR 28 mg oral capsule,sprinkle,ER 24hr    2016                      TAKE 1 CAPSULE BY

 MOUTH EVERY DAY                         

 

                furosemide 40 mg oral tablet    2016                      take 1 tablet (40 mg) by oral route

 once daily                              

 

                mirtazapine 45 mg oral tablet                                    take 1 tablet (45 mg) by oral route once daily

 before bedtime                          

 

             Fish Oil 300-1,000 mg oral capsule                              take 1 capsule by oral route daily     

 

             Vitamin D3 1,000 unit oral tablet                              take 1 tablet by oral route daily     

 

                Calcium 600 600 mg calcium (1,500 mg) oral tablet                                    take 1 tablet by oral route

 daily                                   

 

                Aspirin Low Dose 81 mg oral tablet,delayed release (DR/EC)                                    take 1 tablet 

(81 mg) by oral route once daily         

 

                metoclopramide HCl 10 mg oral tablet    2017                       take 1 tablet by oral route 

2 times a day for 50 days                

 

             furosemide 40 mg oral tablet    2017                 TAKE 1 TABLET BY MOUTH DAILY     

 

                Namenda XR 28 mg oral capsule,sprinkle,ER 24hr    2017                       TAKE 1 CAPSULE BY 

MOUTH EVERY DAY                          

 

                Linzess 72 mcg oral capsule    2017                       take 1 capsule (72 mcg) by oral route

 once daily on an empty stomach at least 30 minutes before 1st meal of the day     



 

             pravastatin 40 mg oral tablet    2017                 TAKE 1 TABLET BY MOUTH DAILY     

 

                metoclopramide HCl 10 mg oral tablet    2017                       TAKE 1 TABLET BY ORAL ROUTE 

2 TIMES A DAY FOR 50 DAYS                

 

                potassium chloride 10 mEq oral tablet extended release    2017                       TAKE 2 TABLETs

 BY MOUTH twice DAILY for one week       

 

                potassium chloride 10 mEq oral tablet extended release    2017                       TAKE 2 TABLETs

 BY MOUTH twice DAILY for one week       

 

             simvastatin 20 mg oral tablet    2017                 TAKE 1 TABLET BY MOUTH AT BEDTIME    

 

 

             famotidine 20 mg oral tablet    2017                 TAKE 1 TABLET BY MOUTH TWICE DAILY    

 

 

             memantine 10 mg oral tablet    2017                 TAKE 1 TABLET BY MOUTH TWICE DAILY     



 

                rivastigmine tartrate 1.5 mg oral capsule    2017                       TAKE 1 CAPSULE BY MOUTH

 TWICE DAILY BEFORE MEALS                









                                         

 

             Name         Start Date    Expiration Date    SIG          Comments

 

             Reglan 10mg    3/29/2010    2010    one ac and hs     

 

                Keflex 500 mg oral capsule    2010       10/1/2010       take 1 capsule (500 mg) by oral

 route every 6 hours for 10 days         

 

                Bactrim -160 mg oral tablet    2011       take 1 tablet by oral route

 every 12 hours for 7 days               

 

                triamcinolone acetonide 0.1 % topical cream    2011      apply a thin

 layer to the affected area(s) by topical route 2 times per day     

 

                sertraline 100 mg oral tablet    4/10/2012       5/10/2012       take 1.5 tablets by oral route

 daily for 30 days                       

 

                ergocalciferol (vitamin D2) 50,000 unit oral capsule    4/15/2013       2013       TAKE

 ONE CAPSULE BY MOUTH ONCE A WEEK        

 

                CYANOCOBALAM 1000MCGINJ 1000 milliliter    2013       INJECT 1ML INTRAMUSCULAR

 ONCE A MONTH                            

 

                pravastatin 40 mg oral tablet    3/25/2014       3/20/2015       TAKE ONE TABLET BY MOUTH EVERY

 DAY                                     

 

                          Zostavax (PF) 19,400 unit/0.65 mL subcutaneous suspension for reconstitution    3/23/2015

                    3/24/2015           inject 0.65 milliliter by subcutaneous route once     

 

                famciclovir 500 mg oral tablet    12/3/2015       12/10/2015      take 1 tablet (500 mg) by

 oral route every 8 hours for 7 days     

 

                furosemide 40 mg oral tablet    2016      take 1 tablet (40 mg) by oral

 route once daily                        

 

                Cipro 500 mg oral tablet    2016       take 1 tablet (500 mg) by oral route

 2 times per day for 5 days              

 

                Bactrim -160 mg oral tablet    2016        take 1 tablet by oral route

 every 12 hours for 7 days               

 

                metoclopramide HCl 10 mg oral tablet    2017       take 1 tablet by oral

 route 2 times a day for 50 days         

 

                Macrobid 100 mg oral capsule    2017       take 1 capsule (100 mg) by oral

 route 2 times per day with food for 7 days     

 

                Augmentin 875-125 mg oral tablet    2017       take 1 tablet by oral route

 every 12 hours for 7 days               

 

                amoxicillin 500 mg oral tablet    2017       take 1 tablet (500 mg) by oral

 route every 12 hours for 7 days         

 

                ciprofloxacin HCl 500 mg oral tablet    2017       take 1 tablet (500 mg)

 by oral route every 12 hours for 5 days     

 

                cefuroxime axetil 500 mg oral tablet    2017        take 1 tablet (500 mg)

 by oral route 2 times per day for 10 days     









                                        Discontinued 

 

             Name         Start Date    Discontinued Date    SIG          Comments

 

                Tylenol 325 mg oral tablet                    2013        take 1 - 2 tablets (325 -650 mg) by oral

 route every 4-6 hours as needed         

 

                Calcium 600 + D(3) 600 mg(1,500mg) -400 unit oral tablet                    2011       take 1 tablet

 by oral route 2 times a day            no longer taking

 

                Vitamin B-12 1,000 mcg oral tablet extended release    2010       take 1

 tablet by oral route daily             no longer taking

 

                Antifungal (clotrimazole) 1 % topical cream    2010       apply to the 

affected and surrounding areas of skin by topical route 2 times per day morning 
and evening                              

 

                sertraline 100 mg oral tablet    5/10/2011       2011       take 2 tablets (200 mg) by 

oral route once daily                   discontinued by Dr. Serrano

 

                mirtazapine 15 mg oral tablet                    2011        take 1 tablet (15 mg) by oral route 

once daily before bedtime               Dr. Serrano

 

                mirtazapine 15 mg oral tablet                    2011        take 1 tablet (15 mg) by oral route 

once daily before bedtime               dc'd by Dr. Serrano

 

                Pristiq 50 mg oral tablet extended release 24 hr                    2013        take 1 tablet (50

 mg) by oral route once daily           Dr. Serrano

 

                Pristiq 50 mg oral tablet extended release 24 hr                    2013        take 1 tablet (50

 mg) by oral route once daily           dose updated

 

                Vitamin B-12 1,000 mcg/mL injection solution    2011        inject 1 milliliter

 (1,000 mcg) by intramuscular route once a month    on list already

 

                    syringe with needle 1 mL 25 gauge x 1" miscellaneous syringe    2011

                          use for injection once a month     

 

                clotrimazole 1 % topical cream    2011        apply to the affected and surrounding

 areas of skin by topical route 2 times per day in the morning and evening     

 

                Vitamin D2 50,000 unit oral capsule    2011        take 1 capsule (50,000

 unit) by oral route once weekly        generic on list

 

                Pravachol 40 mg oral tablet    2012        take 1 tablet (40 mg) by oral 

route once daily for 90 days            generic on list

 

                lithium carbonate 300 mg oral capsule    2012        take 1 capsule by oral

 route daily                            dose updated

 

                Pristiq 100 mg oral tablet extended release 24 hr                    4/10/2012       take 1 and 1/2 

tablet (150 mg) by oral route once daily    Mental Health provider

 

                Pristiq 100 mg oral tablet extended release 24 hr                    4/10/2012       take 1 and 1/2 

tablet (150 mg) by oral route once daily    Discontinued by Dr Efrain [facility] at [facility]

 

                hydroxyzine HCl 50 mg oral tablet    10/16/2014      2015       take 1 tablet (50 mg) 

by oral route at bedtime                 

 

                lithium carbonate 300 mg oral capsule    2015       take 1 capsule (300

 mg) by oral route 2 for 30 days         

 

                fluconazole 100 mg oral tablet    2015       12/3/2015       take 1 tablet (100 mg) by 

oral route once a week                   

 

                ketoconazole 2 % topical cream    2015       12/3/2015       apply to the affected area(s)

 by topical route 2 times per day        

 

                prednisone 10 mg oral tablet    12/3/2015       2016        take 2 tablets (20 mg) by oral

 route once daily for 4 days 1 tablet daily for 4 days 0.5 tablet daily for 4 
days                                     

 

                triamcinolone acetonide 0.1 % topical cream    2016       apply a thin layer

 to the affected area(s) by topical route 2 times per day     

 

                Cipro 500 mg oral tablet    1/15/2017       2017       take 1 tablet (500 mg) by oral route

 every 12 hours for 10 days              







Problem List







                    Description         Status              Onset

 

                    Artificial opening status; colostomy    Active               

 

                    Bipolar disorder, unspecified    Active               

 

                    Hyperlipidemia      Active               

 

                    Peritoneal Neoplasm, Malignant    Active               

 

                    Anemia, Pernicious    Active               

 

                    Arthritis unspecified    Active               

 

                    B12 deficiency      Active               







Vital Signs







      Date    Time    BP-Sys(mm[Hg]    BP-Lynn(mm[Hg])    HR(bpm)    RR(rpm)    Temp    WT    HT    HC    BMI

                    BSA                 BMI Percentile      O2 Sat(%)

 

        2017    1:34:00 PM    118 mmHg    62 mmHg    122 bpm    18 rpm    97.8 F    161.375 lbs    

69 in                     23.83 kg/m2    1.89 m2                   96 %

 

        2017    3:05:00 PM    134 mmHg    70 mmHg    70 bpm    20 rpm    97.4 F    181 lbs    69 in

                          26.7288 kg/m    1.9992 m                 98 %

 

        2017    11:07:00 AM    124 mmHg    64 mmHg    62 bpm    17 rpm    98.2 F    181.2 lbs    69

 in                       26.76 kg/m2    2.00 m2                   98 %

 

        1/15/2017    3:34:00 PM    148 mmHg    89 mmHg    69 bpm    20 rpm    98.2 F    179 lbs    69 in

                          26.4334 kg/m    1.9882 m                 98 %

 

       2017    1:51:00 PM    160 mmHg    90 mmHg    100 bpm    20 rpm    96.5 F    179 lbs             

                                                                98 %

 

       2016    3:11:00 PM    134 mmHg    76 mmHg    80 bpm    20 rpm    98 F    163 lbs    69 in     

                24.0706 kg/m    1.8972 m                      98 %

 

        2016    2:04:00 PM    142 mmHg    86 mmHg    68 bpm    16 rpm    98.5 F    166 lbs    63 in

                          29.41 kg/m2    1.83 m2                   100 %

 

        2016    11:27:00 AM    148 mmHg    78 mmHg    90 bpm    20 rpm    98.2 F    153 lbs    69 in

                          22.5939 kg/m    1.8381 m                 96 %

 

        12/3/2015    9:50:00 AM    132 mmHg    70 mmHg    62 bpm    16 rpm    97.9 F    145 lbs    69 in

                          21.41 kg/m2    1.79 m2                   100 %

 

        2015    8:52:00 AM    132 mmHg    68 mmHg    52 bpm    20 rpm    97.8 F    141 lbs    69 in

                          20.8218 kg/m    1.7645 m                 100 %

 

        2015    3:25:00 PM    120 mmHg    62 mmHg    72 bpm    16 rpm    98.1 F    136 lbs    69 in

                          20.08 kg/m2    1.73 m2                   98 %

 

       3/23/2015    2:55:00 PM    130 mmHg    76 mmHg    68 bpm    18 rpm    97 F    140 lbs    69 in    

                20.6742 kg/m    1.7583 m                      98 %

 

        10/16/2014    11:11:00 AM    120 mmHg    66 mmHg    77 bpm    20 rpm    98 F    130 lbs    69 in

                          19.20 kg/m2    1.69 m2                   100 %

 

        2014    3:21:00 PM    130 mmHg    66 mmHg    63 bpm    18 rpm    97.2 F    160 lbs    69 in

                          23.6276 kg/m    1.8797 m                 99 %

 

        2013    10:35:00 AM    132 mmHg    70 mmHg    66 bpm    20 rpm    98.1 F    157 lbs    69 in

                          23.18 kg/m2    1.86 m2                    

 

        2013    1:29:00 PM    132 mmHg    70 mmHg    76 bpm    18 rpm    98.2 F    166 lbs    69 in 

                          24.5137 kg/m    1.9146 m                  

 

       2013    2:46:00 PM    128 mmHg    70 mmHg    76 bpm    16 rpm    98 F    160 lbs    69 in     

                23.63 kg/m2     1.88 m2                          

 

        2011    8:49:00 AM    128 mmHg    78 mmHg    70 bpm    18 rpm    97.9 F    164 lbs    69 in

                          24.2183 kg/m    1.903 m                  

 

     2011    1:31:00 PM    132 mmHg    68 mmHg    84 bpm         97 F    167 lbs                        

                                         

 

        2011    9:09:00 AM    128 mmHg    70 mmHg    72 bpm    18 rpm    98.2 F    163 lbs    64 in 

                          27.9786 kg/m    1.8272 m                  

 

       2011    10:01:00 AM    132 mmHg    70 mmHg    72 bpm    18 rpm    98.2 F    154 lbs             

                                                                 

 

       2011    2:47:00 PM    128 mmHg    70 mmHg    72 bpm    18 rpm    97.8 F    156 lbs             

                                                                 

 

       5/10/2011    3:16:00 PM    144 mmHg    80 mmHg    72 bpm    18 rpm    98.2 F    158 lbs             

                                                                 

 

        2011    10:11:00 AM    132 mmHg    70 mmHg    70 bpm    18 rpm    98.2 F    168 lbs    69 in

                          24.809 kg/m    1.9261 m                  

 

        4/15/2011    10:52:00 AM    110 mmHg    60 mmHg    75 bpm    16 rpm    97.5 F    172.375 lbs    

69 in                     25.46 kg/m2    1.95 m2                   100 %

 

        2011    11:43:00 AM    120 mmHg    82 mmHg    75 bpm    16 rpm    97.2 F    178.5 lbs    69

 in                       26.3596 kg/m    1.9854 m                 100 %

 

        10/15/2010    1:32:00 PM    120 mmHg    70 mmHg    80 bpm    18 rpm    96.6 F    177 lbs    69 in

                          26.14 kg/m2    1.98 m2                   100 %

 

        2010    3:50:00 PM    168 mmHg    100 mmHg    82 bpm    18 rpm    97.8 F    177.5 lbs    69

 in                       26.2119 kg/m    1.9798 m                 97 %

 

        2010    1:21:00 PM    140 mmHg    80 mmHg    59 bpm    16 rpm    97.6 F    173.25 lbs    69 

in                        25.58 kg/m2    1.96 m2                   100 %

 

        2010    3:02:00 PM    140 mmHg    80 mmHg    61 bpm    16 rpm    97.6 F    173.125 lbs    69

 in                       25.5658 kg/m    1.9553 m                 99 %

 

        2010    1:23:00 PM    130 mmHg    80 mmHg    66 bpm    16 rpm    96.8 F    173 lbs    69 in 

                          25.55 kg/m2    1.95 m2                   100 %

 

        2010    12:58:00 PM    130 mmHg    88 mmHg    75 bpm    16 rpm    98.4 F    172.25 lbs    69

 in                       25.4366 kg/m    1.9503 m                 100 %







Social History







                    Name                Description         Comments

 

                    denies alcohol use                         

 

                    denies smoking                           

 

                    Denies illicit substance abuse                         

 

                    retired                                 direct care

 

                    Single                                   

 

                    Exercises regularly                         

 

                    Attended some college                         

 

                    Tobacco             Never smoker         







History of Procedures







                    Date Ordered        Description         Order Status

 

                    2010 12:00 AM    COMPREHEN METABOLIC PANEL    Reviewed

 

                    2010 12:00 AM    COMPLETE CBC W/AUTO DIFF WBC    Reviewed

 

                    2010 12:00 AM    LIPID PANEL         Reviewed

 

                          2015 12:00 AM        B12 Injection, Up to 1000 Mcg NDC#0129-5942-89 [facility] 

                                        Reviewed

 

                    2011 12:00 AM    MAMMOGRAM SCREENING    Reviewed

 

                    2011 12:00 AM    CYTOPATH C/V THIN LAYER    Reviewed

 

                    2011 12:00 AM    B12 Injection 1 cc NDC#[address]-08    Reviewed

 

                    2015 12:00 AM    THER/PROPH/DIAG INJ SC/IM    Reviewed

 

                    2015 12:00 AM    B12 Injection, Up to 1000 Mcg NDC#6664-7891-67    Reviewed

 

                    2011 12:00 AM    THER/PROPH/DIAG INJ SC/IM    Reviewed

 

                    2011 12:00 AM    B12 Injection([place]) Ndc#7506-7474-94-    Reviewed

 

                    2015 12:00 AM    THER/PROPH/DIAG INJ SC/IM    Reviewed

 

                    2015 12:00 AM    B12 Injection, Up to 1000 Mcg NDC#7448-4346-20    Reviewed

 

                    10/20/2011 12:00 AM    THER/PROPH/DIAG INJ SC/IM    Reviewed

 

                    10/20/2011 12:00 AM    B12 Injection([place]) Ndc#9397-1733-15-    Reviewed

 

                    2016 12:00 AM    THER/PROPH/DIAG INJ SC/IM    Reviewed

 

                    2016 12:00 AM    B12 Injection, Up to 1000 Mcg NDC#8528-3129-57    Reviewed

 

                    3/14/2016 12:00 AM    VITAMIN B-12        Reviewed

 

                    3/15/2016 12:00 AM    THER/PROPH/DIAG INJ SC/IM    Reviewed

 

                    3/15/2016 12:00 AM    B12 Injection, Up to 1000 Mcg NDC#[address]58[address]-32    Reviewed

 

                    2011 12:00 AM    ***Immunization administration, Medicare flu    Reviewed

 

                    2011 12:00 AM    Fluzone ** MEDICARE Only **    Reviewed

 

                    2011 12:00 AM    THER/PROPH/DIAG INJ SC/IM    Reviewed

 

                    2011 12:00 AM    B12 Injection ([employer] [facility]) Ndc#6450-4329-24    Reviewed

 

                    2016 12:00 AM    B12 Injection, Up to 1000 Mcg NDC#0353-1451-95 [facility] Medicare    

Reviewed

 

                    2016 12:00 AM    TTE W/DOPPLER COMPLETE    Reviewed

 

                    2016 12:00 AM    EXTREMITY STUDY     Reviewed

 

                          2016 12:00 AM        B12 Injection, Up to 1000 Mcg NDC#5601-9111-17 [facility] Medicare 

                                        Reviewed

 

                    2016 12:00 AM    THER/PROPH/DIAG INJ SC/IM    Reviewed

 

                    2016 12:00 AM    B12 Injection, Up to 1000 Mcg NDC#2238-8467-22    Reviewed

 

                    2016 12:00 AM    THER/PROPH/DIAG INJ SC/IM    Reviewed

 

                    2012 12:00 AM    B12 Injection([place]) Ndc#0612-5525-58-    Reviewed

 

                    2016 12:00 AM    B12 Injection, Up to 1000 Mcg NDC#5677-3486-84    Reviewed

 

                    2016 12:00 AM    THER/PROPH/DIAG INJ SC/IM    Reviewed

 

                    2012 12:00 AM    THER/PROPH/DIAG INJ SC/IM    Reviewed

 

                    2012 12:00 AM    B12 Injection ([employer]) Ndc#1368-3277-34    Reviewed

 

                    2016 12:00 AM    THER/PROPH/DIAG INJ SC/IM    Reviewed

 

                    2016 12:00 AM    B12 Injection, Up to 1000 Mcg NDC#9394-2273-19    Reviewed

 

                    2016 12:00 AM    B12 Injection, Up to 1000 Mcg NDC#8514-6675-40    Reviewed

 

                    2016 12:00 AM    THER/PROPH/DIAG INJ SC/IM    Reviewed

 

                    2012 12:00 AM    THER/PROPH/DIAG INJ SC/IM    Reviewed

 

                    2012 12:00 AM    B12 Injection([place]) Ndc#8835-8966-07-    Reviewed

 

                    12/15/2016 12:00 AM    B12 Injection, Up to 1000 Mcg NDC#2347-4523-78    Reviewed

 

                    12/15/2016 12:00 AM    THER/PROPH/DIAG INJ SC/IM    Reviewed

 

                    2016 12:00 AM    URNLS DIP STICK/TABLET RGNT AUTO W/O MICROSCOPY    Reviewed

 

                    1/3/2017 12:00 AM    URNLS DIP STICK/TABLET RGNT AUTO W/O MICROSCOPY    Reviewed

 

                    2017 12:00 AM    URINE CULTURE/COLONY COUNT    Reviewed

 

                    2017 12:00 AM    Rocephin 1 gram Injection, [facility] Medicare    Reviewed

 

                    2017 12:00 AM    THERAPEUTIC PROPHYLACTIC/DX INJECTION SUBQ/IM    Reviewed

 

                    2017 12:00 AM    B12 1000mcg Injection, [facility] Medicare    Reviewed

 

                    5/3/2012 12:00 AM    THER/PROPH/DIAG INJ SC/IM    Reviewed

 

                    5/3/2012 12:00 AM    B12 Injection([place]) Ndc#3636-5817-81-    Reviewed

 

                    2017 12:00 AM    THERAPEUTIC PROPHYLACTIC/DX INJECTION SUBQ/IM    Reviewed

 

                    2017 12:00 AM    B12 1000mcg Injection, [facility]    Reviewed

 

                    2017 12:00 AM    MRI BRAIN STEM W/O & W/DYE    Reviewed

 

                    2017 12:00 AM    VITAMIN B-12        Reviewed

 

                    2017 12:00 AM    Speech Therapy Consult    Reviewed

 

                    2017 12:00 AM    ASSAY OF CREATININE    Reviewed

 

                    2012 12:00 AM    IMMUNOTHERAPY INJECTIONS    Reviewed

 

                    2012 12:00 AM    B12 Injection([place]) Ndc#5171-2493-55-    Reviewed

 

                    2017 12:00 AM    URINALYSIS AUTO W/SCOPE    Reviewed

 

                    2012 12:00 AM    THER/PROPH/DIAG INJ SC/IM    Reviewed

 

                    2012 12:00 AM    B12 Injection, Up to 1000 Mcg NDC#5573-5330-99    Reviewed

 

                    2017 12:00 AM    URINALYSIS AUTO W/SCOPE    Reviewed

 

                    2017 2:18 PM    URINALYSIS AUTO W/O SCOPE    Reviewed

 

                    2017 12:00 AM    URINE CULTURE/COLONY COUNT    Reviewed

 

                    2017 12:00 AM    B12 1000mcg Injection    Reviewed

 

                    2017 12:00 AM    URNLS DIP STICK/TABLET RGNT AUTO W/O MICROSCOPY    Reviewed

 

                    2017 12:00 AM    METABOLIC PANEL TOTAL CA    Reviewed

 

                    2017 12:00 AM    URNLS DIP STICK/TABLET RGNT AUTO W/O MICROSCOPY    Reviewed

 

                    2012 12:00 AM    THER/PROPH/DIAG INJ SC/IM    Reviewed

 

                    2012 12:00 AM    B12 Injection, Up to 1000 Mcg NDC#2324-1148-19    Reviewed

 

                    2012 12:00 AM    THER/PROPH/DIAG INJ SC/IM    Reviewed

 

                    2012 12:00 AM    B12 Injection, Up to 1000 Mcg NDC#7059-9074-86    Reviewed

 

                    10/16/2012 12:00 AM    THER/PROPH/DIAG INJ SC/IM    Reviewed

 

                    10/16/2012 12:00 AM    B12 Injection, Up to 1000 Mcg NDC#7626-7784-12    Reviewed

 

                    2010 12:00 AM    COMPREHEN METABOLIC PANEL    Reviewed

 

                    2010 12:00 AM    COMPLETE CBC W/AUTO DIFF WBC    Reviewed

 

                    2010 12:00 AM    LIPID PANEL         Reviewed

 

                    2013 12:00 AM    Flu Injection 3 Years And Above NDC# [address]-06  [facility]    Reviewed



 

                    2013 12:00 AM    COMPLETE CBC W/AUTO DIFF WBC    Reviewed

 

                    2013 12:00 AM    ASSAY OF LITHIUM    Reviewed

 

                    2013 12:00 AM    METABOLIC PANEL TOTAL CA    Reviewed

 

                    4/3/2013 12:00 AM    THER/PROPH/DIAG INJ SC/IM    Reviewed

 

                    4/3/2013 12:00 AM    B12 Injection, Up to 1000 Mcg NDC#9579-4097-28    Reviewed

 

                    2013 12:00 AM    THER/PROPH/DIAG INJ SC/IM    Reviewed

 

                    2013 12:00 AM    B12 Injection, Up to 1000 Mcg NDC#2495-8229-15    Reviewed

 

                    2013 12:00 AM    THER/PROPH/DIAG INJ SC/IM    Reviewed

 

                    2013 12:00 AM    B12 Injection, Up to 1000 Mcg NDC#1528-7901-51    Reviewed

 

                    2013 12:00 AM    LIPID PANEL         Reviewed

 

                    2013 12:00 AM    VITAMIN D 25 HYDROXY    Reviewed

 

                    2013 12:00 AM    THER/PROPH/DIAG INJ SC/IM    Reviewed

 

                    2013 12:00 AM    B12 Injection, Up to 1000 Mcg NDC#6193-3911-03    Reviewed

 

                    2013 12:00 AM    THER/PROPH/DIAG INJ SC/IM    Reviewed

 

                    3/6/2014 12:00 AM    THER/PROPH/DIAG INJ SC/IM    Reviewed

 

                    2014 12:00 AM    THER/PROPH/DIAG INJ SC/IM    Reviewed

 

                    2014 12:00 AM    B12 Injection, Up to 1000 Mcg NDC#8291-5178-21    Reviewed

 

                    2010 12:00 AM    SKIN FUNGI CULTURE    Reviewed

 

                    10/9/2010 12:00 AM    COMPREHEN METABOLIC PANEL    Reviewed

 

                    10/9/2010 12:00 AM    LIPID PANEL         Reviewed

 

                    2010 12:00 AM    THER/PROPH/DIAG INJ SC/IM    Reviewed

 

                    2010 12:00 AM    B12 Injection Ndc#[address]-36 ([place])    Reviewed

 

                    2010 12:00 AM    THER/PROPH/DIAG INJ SC/IM    Reviewed

 

                    2010 12:00 AM    Kenalog 40 Mg Im-Ndc#[address]-16 ([place])    Reviewed

 

                    10/15/2010 12:00 AM    FLU VACCINE 3 YRS & > IM    Reviewed

 

                    10/15/2010 12:00 AM    Admin.Of M/C Cov.Vaccine-Flu Vacc.    Reviewed

 

                    1/15/2011 12:00 AM    COMPLETE CBC W/AUTO DIFF WBC    Reviewed

 

                    1/15/2011 12:00 AM    COMPREHEN METABOLIC PANEL    Reviewed

 

                    1/15/2011 12:00 AM    LIPID PANEL         Reviewed

 

                    2014 12:00 AM    MAMMOGRAM SCREENING    Reviewed

 

                    2014 12:00 AM    Screening mammography, bilateral    Reviewed

 

                    7/10/2014 12:00 AM    THER/PROPH/DIAG INJ SC/IM    Reviewed

 

                    7/10/2014 12:00 AM    B12 Injection, Up to 1000 Mcg NDC#1881-1780-55    Reviewed

 

                    2011 12:00 AM    COMPLETE CBC W/AUTO DIFF WBC    Reviewed

 

                    2011 12:00 AM    COMPREHEN METABOLIC PANEL    Reviewed

 

                    2011 12:00 AM    LIPID PANEL         Reviewed

 

                    2014 12:00 AM    B12 Injection, Up to 1000 Mcg NDC#0067-0019-50    Reviewed

 

                    10/19/2014 12:00 AM    MAMMOGRAM SCREENING    Reviewed

 

                    10/19/2014 12:00 AM    Screening mammography, bilateral    Reviewed

 

                    10/16/2014 12:00 AM    B12 Injection, Up to 1000 Mcg NDC#8614-2498-54    Reviewed

 

                    10/16/2014 12:00 AM    COMPLETE CBC W/AUTO DIFF WBC    Reviewed

 

                    10/16/2014 12:00 AM    COMPREHEN METABOLIC PANEL    Reviewed

 

                    10/16/2014 12:00 AM    IMMUNOASSAY TUMOR     Reviewed

 

                    10/16/2014 12:00 AM    LIPID PANEL         Reviewed

 

                    10/16/2014 12:00 AM    ASSAY OF LITHIUM    Reviewed

 

                    10/16/2014 12:00 AM    MAMMOGRAM SCREENING    Reviewed

 

                    2011 12:00 AM    ASSAY OF PARATHORMONE    Reviewed

 

                    2011 12:00 AM    VITAMIN D 25 HYDROXY    Reviewed

 

                    2011 12:00 AM    ASSAY OF LITHIUM    Reviewed

 

                    2011 12:00 AM    METABOLIC PANEL TOTAL CA    Reviewed

 

                    2011 12:00 AM    CT HEAD/BRAIN W/O & W/DYE    Reviewed

 

                    3/23/2015 12:00 AM    PNEUMOCOCCAL VACC 13 GLENDY IM    Reviewed

 

                    3/23/2015 12:00 AM    Vitamin B12 injection    Reviewed

 

                    2011 12:00 AM    ASSAY OF LITHIUM    Reviewed

 

                    2011 12:00 AM    B12 Injection Ndc#[address]-57  [place]    Reviewed

 

                    2015 12:00 AM    THER/PROPH/DIAG INJ SC/IM    Reviewed

 

                    2015 12:00 AM    B12 Injection, Up to 1000 Mcg NDC#6467-6858-62    Reviewed

 

                    2015 12:00 AM    COMPLETE CBC W/AUTO DIFF WBC    Reviewed

 

                    2015 12:00 AM    COMPREHEN METABOLIC PANEL    Reviewed

 

                    2015 12:00 AM    LIPID PANEL         Reviewed

 

                    2015 12:00 AM    ASSAY OF LITHIUM    Reviewed

 

                    2011 12:00 AM    VIT D 1 25-DIHYDROXY    Reviewed

 

                    2011 12:00 AM    VITAMIN B-12        Reviewed

 

                    2015 12:00 AM    B12 Injection, Up to 1000 Mcg NDC#6203-7188-83    Reviewed

 

                    2015 12:00 AM    THER/PROPH/DIAG INJ SC/IM    Reviewed

 

                    2015 12:00 AM    B12 Injection, Up to 1000 Mcg NDC#0208-6127-13    Reviewed

 

                    2011 12:00 AM    THER/PROPH/DIAG INJ SC/IM    Reviewed

 

                    2011 12:00 AM    B12 Injection ([employer]) Ndc#5892-5770-65    Reviewed

 

                    2015 12:00 AM    THER/PROPH/DIAG INJ SC/IM    Reviewed

 

                    2015 12:00 AM    B12 Injection, Up to 1000 Mcg NDC#8333-2982-55    Reviewed







Results Summary







                          Date and Description      Results

 

                          2004 12:00 AM        Colonoscopy-Women and Men over 50 Normal 

 

                          2008 12:00 AM         Pap Smear Declined 

 

                          10/7/2009 12:00 AM        Cholest Cry Stone Ql .0 %LDLc SerPl-mCnc 123.0 mg/dLHDLc

 SerPl-mCnc 34.0 mg/dLTrigl SerPl-mCnc 190.0 mg/dLGlucose SerPl-mCnc 78.0 mg/dL

 

                          2009 12:00 AM        Mammogram -Women over 40 Normal HIV1+2 Ab Ser Ql no risk 

 

                          2010 8:47 AM         Dexa Bone Scan Refused Aspirin reccommended Contraindication 



 

                          2010 8:48 AM         Depression Done 

 

                          2010 12:00 AM         Foot Exam-Diabetic Done 

 

                          2010 12:00 AM         Cholest Cry Stone Ql .0 %LDLc SerPl-mCnc 126.0 mg/dLGlucose

 SerPl-mCnc 102.0 mg/dL

 

                          2010 8:45 AM          TRIGLYCERIDES 122.0 mg/dLCHOLESTEROL 186.0 mg/dLHDL 36.0 mg/dLTOT

 CHOL/HDL 5.2 LDL (CALC) 126.0 mg/dLGLUCOSE 102.0 mg/dLSODIUM 143.0 
mmol/LPOTASSIUM 3.70 mmol/LCHLORIDE 111.0 mmol/LCO2 23.0 mmol/LBUN 10.0 
mg/dLCREATININE 0.80 mg/dLSGOT/AST 12.0 IU/LSGPT/ALT 11.0 IU/LALK PHOS 65.0 
IU/LTOTAL PROTEIN 7.20 g/dLALBUMIN 3.90 g/dLTOTAL BILI 0.50 mg/dLCALCIUM 10.20 
mg/dLAGE 59 GFR NonAA 73 GFR AA 88 eGFR >60 mL/min/1.73 m2eGFR AA* >60 WBC 5.7 
RBC 3.26 HGB 10.60 g/dLHCT 31.70 %MCV 97.0 fLMCH 32.50 pgMCHC 33.40 g/dLRDW SD 
47 RDW CV 13.30 %MPV 9.70 fLPLT 287 NRBC# 0.00 NRBC% 0.0 %NEUT 62.90 %%LYMP 
21.80 %%MONO 9.90 %%EOS 5.0 %%BASO 0.40 %#NEUT 3.56 #LYMP 1.23 #MONO 0.56 #EOS 
0.28 #BASO 0.02 MANUAL DIFF NOT IND 

 

                          2010 12:00 AM        Glucose SerPl-mCnc 96.0 mg/dLCholest Cry Stone Ql .0 %LDLc

 SerPl-mCnc 146.0 mg/dL

 

                          2010 8:26 AM         TRIGLYCERIDES 106.0 mg/dLCHOLESTEROL 199.0 mg/dLHDL 32.0 mg/dLTOT

 CHOL/HDL 6.2 LDL (CALC) 146.0 mg/dLGLUCOSE 96.0 mg/dLSODIUM 143.0 
mmol/LPOTASSIUM 4.0 mmol/LCHLORIDE 113.0 mmol/LCO2 24.0 mmol/LBUN 13.0 
mg/dLCREATININE 1.0 mg/dLSGOT/AST 11.0 IU/LSGPT/ALT 6.0 IU/LALK PHOS 56.0 
IU/LTOTAL PROTEIN 6.60 g/dLALBUMIN 3.80 g/dLTOTAL BILI 0.50 mg/dLCALCIUM 9.30 
mg/dLAGE 59 GFR NonAA 57 GFR AA 69 eGFR 57 eGFR AA* >60 

 

                          10/6/2010 12:00 AM        Cholest Cry Stone Ql .0 %LDLc SerPl-mCnc 111.0 mg/dLGlucose

 SerPl-mCnc 81.0 mg/dL

 

                          10/6/2010 2:45 PM         TRIGLYCERIDES 123.0 mg/dLCHOLESTEROL 178.0 mg/dLHDL 42.0 mg/dLTOT

 CHOL/HDL 4.2 LDL (CALC) 111.0 mg/dLGLUCOSE 81.0 mg/dLSODIUM 139.0 
mmol/LPOTASSIUM 4.10 mmol/LCHLORIDE 106.0 mmol/LCO2 24.0 mmol/LBUN 13.0 
mg/dLCREATININE 0.90 mg/dLSGOT/AST 13.0 IU/LSGPT/ALT 11.0 IU/LALK PHOS 61.0 
IU/LTOTAL PROTEIN 7.10 g/dLALBUMIN 3.90 g/dLTOTAL BILI 0.30 mg/dLCALCIUM 9.30 
mg/dLAGE 60 GFR NonAA 64 GFR AA 78 eGFR >60 mL/min/1.73 m2eGFR AA* >60 WBC 6.9 
RBC 3.59 HGB 11.50 g/dLHCT 35.30 %MCV 98.0 fLMCH 32.0 pgMCHC 32.60 g/dLRDW SD 46
 RDW CV 12.90 %MPV 9.90 fLPLT 311 NRBC# 0.00 NRBC% 0.0 %NEUT 64.90 %%LYMP 22.50 
%%MONO 7.20 %%EOS 5.10 %%BASO 0.30 %#NEUT 4.45 #LYMP 1.54 #MONO 0.49 #EOS 0.35 
#BASO 0.02 MANUAL DIFF NOT IND 

 

                          2011 12:00 AM         Mammogram -Women over 40 Ordered 

 

                          2011 10:25 AM        TRIGLYCERIDES 111.0 mg/dLCHOLESTEROL 195.0 mg/dLHDL 43.0 mg/dLTOT

 CHOL/HDL 4.5 LDL (CALC) 130.0 mg/dLWBC 5.3 RBC 3.76 HGB 12.0 g/dLHCT 37.80 %MCV
 101.0 fLMCH 31.90 pgMCHC 31.70 g/dLRDW SD 47 RDW CV 13.0 %MPV 9.70 fLPLT 259 
NRBC# 0.00 NRBC% 0.0 %NEUT 69.0 %%LYMP 17.60 %%MONO 8.30 %%EOS 4.70 %%BASO 0.40 
%#NEUT 3.63 #LYMP 0.93 #MONO 0.44 #EOS 0.25 #BASO 0.02 MANUAL DIFF NOT IND 
GLUCOSE 102.0 mg/dLSODIUM 146.0 mmol/LPOTASSIUM 4.20 mmol/LCHLORIDE 113.0 
mmol/LCO2 23.0 mmol/LBUN 15.0 mg/dLCREATININE 1.0 mg/dLSGOT/AST 12.0 
IU/LSGPT/ALT 17.0 IU/LALK PHOS 60.0 IU/LTOTAL PROTEIN 6.90 g/dLALBUMIN 4.20 
g/dLTOTAL BILI 0.40 mg/dLCALCIUM 9.70 mg/dLAGE 60 GFR NonAA 57 GFR AA 69 eGFR 57
 eGFR AA* >60 

 

                          2011 11:49 AM        Cholest Cry Stone Ql .0 %LDLc SerPl-mCnc 130.0 mg/dLHDLc

 SerPl-mCnc 43.0 mg/dLTrigl SerPl-mCnc 111.0 mg/dLGlucose SerPl-mCnc 102.0 mg/dL

 

                          2011 11:52 AM        Pap Smear Declined 

 

                          2011 11:28 AM        Lithium 2.080 mmol/LGLUCOSE 102.0 mg/dLSODIUM 135.0 mmol/LPOTASSIUM

 3.90 mmol/LCHLORIDE 106.0 mmol/LCO2 21.0 mmol/LBUN 12.0 mg/dLCREATININE 1.30 
mg/dLCALCIUM 10.70 mg/dLAGE 60 GFR NonAA 42 GFR AA 51 eGFR 42 eGFR AA* 51 

 

                          2011 8:58 AM          Lithium 0.690 mmol/L

 

                          2011 2:38 PM         VITAMIN B12 3483.0 pg/mL

 

                          2013 3:35 PM          WBC 5.1 RBC 3.73 HGB 11.70 g/dLHCT 36.40 %MCV 98.0 fLMCH 31.40

 pgMCHC 32.10 g/dLRDW SD 47 RDW CV 13.10 %MPV 9.80 fLPLT 224 NRBC# 0.00 NRBC% 
0.0 %NEUT 66.80 %%LYMP 19.10 %%MONO 9.0 %%EOS 4.90 %%BASO 0.20 %#NEUT 3.42 #LYMP
 0.98 #MONO 0.46 #EOS 0.25 #BASO 0.01 MANUAL DIFF NOT IND GLUCOSE 88.0 
mg/dLSODIUM 141.0 mmol/LPOTASSIUM 4.10 mmol/LCHLORIDE 110.0 mmol/LCO2 22.0 
mmol/LBUN 22.0 mg/dLCREATININE 1.10 mg/dLCALCIUM 9.80 mg/dLAGE 62 GFR NonAA 50 
GFR AA 61 eGFR 50 eGFR AA* 60 Lithium 0.760 mmol/L

 

                          2013 11:02 AM        TRIGLYCERIDES 106.0 mg/dLCHOLESTEROL 181.0 mg/dLHDL 46.0 mg/dLTOT

 CHOL/HDL 3.9 LDL (CALC) 114.0 mg/dLVITAMIN D 41.10 ng/mL

 

                          10/17/2014 10:10 AM       WBC 5.0 RBC 3.66 HGB 11.60 g/dLHCT 36.80 %.0 fLMCH 31.70

 pgMCHC 31.50 g/dLRDW SD 50 RDW CV 13.50 %MPV 10.10 fLPLT 209 NRBC# 0.00 NRBC% 
0.0 %NEUT 69.20 %%LYMP 21.0 %%MONO 6.40 %%EOS 3.20 %%BASO 0.20 %#NEUT 3.46 #LYMP
 1.05 #MONO 0.32 #EOS 0.16 #BASO 0.01 MANUAL DIFF NOT IND GLUCOSE 100.0 
mg/dLSODIUM 148.0 mmol/LPOTASSIUM 3.90 mmol/LCHLORIDE 114.0 mmol/LCO2 26.0 
mmol/LBUN 12.0 mg/dLCREATININE 1.20 mg/dLSGOT/AST 9.0 IU/LSGPT/ALT <6 IU/LALK 
PHOS 82.0 IU/LTOTAL PROTEIN 6.90 g/dLALBUMIN 4.0 g/dLTOTAL BILI 0.40 
mg/dLCALCIUM 10.50 mg/dLAGE 64 GFR NonAA 45 GFR AA 55 eGFR 45 eGFR AA* 55 
TRIGLYCERIDES 96.0 mg/dLCHOLESTEROL 155.0 mg/dLHDL 38.0 mg/dLTOT CHOL/HDL 4.1 
LDL (CALC) 98.0 mg/dLLithium 0.850 mmol/LCancer Antigen (CA) 125 8.30 U/mL

 

                          2015 10:25 AM        Lithium 0.790 mmol/LWBC 4.8 RBC 3.44 HGB 11.0 g/dLHCT 35.20 

%.0 fLMCH 32.0 pgMCHC 31.30 g/dLRDW SD 53 RDW CV 14.0 %MPV 9.30 fLPLT 210
 NRBC# 0.00 NRBC% 0.0 %NEUT 70.80 %%LYMP 17.20 %%MONO 8.10 %%EOS 3.50 %%BASO 
0.40 %#NEUT 3.41 #LYMP 0.83 #MONO 0.39 #EOS 0.17 #BASO 0.02 MANUAL DIFF NOT IND 
TRIGLYCERIDES 107.0 mg/dLCHOLESTEROL 174.0 mg/dLHDL 43.0 mg/dLTOT CHOL/HDL 4.0 
LDL (CALC) 110.0 mg/dLGLUCOSE 90.0 mg/dLSODIUM 145.0 mmol/LPOTASSIUM 3.80 
mmol/LCHLORIDE 115.0 mmol/LCO2 24.0 mmol/LBUN 17.0 mg/dLCREATININE 1.30 
mg/dLSGOT/AST 18.0 IU/LSGPT/ALT 17.0 IU/LALK PHOS 56.0 IU/LTOTAL PROTEIN 6.70 
g/dLALBUMIN 3.90 g/dLTOTAL BILI 0.40 mg/dLCALCIUM 9.80 mg/dLAGE 64 GFR NonAA 41 
GFR AA 50 eGFR 41 eGFR AA* 50 

 

                          2015 8:50 AM        WBC 5.8 RBC 3.29 HGB 10.70 g/dLHCT 34.0 %.0 fLMCH 32.50

 pgMCHC 31.50 g/dLRDW SD 52 RDW CV 13.60 %MPV 9.60 fLPLT 223 NRBC# 0.00 NRBC% 
0.0 %NEUT 69.60 %%LYMP 18.90 %%MONO 8.50 %%EOS 2.80 %%BASO 0.20 %#NEUT 4.03 
#LYMP 1.09 #MONO 0.49 #EOS 0.16 #BASO 0.01 MANUAL DIFF NOT IND Lithium 0.620 
mmol/LGLUCOSE 83.0 mg/dLSODIUM 139.0 mmol/LPOTASSIUM 3.90 mmol/LCHLORIDE 109.0 
mmol/LCO2 22.0 mmol/LBUN 19.0 mg/dLCREATININE 1.40 mg/dLSGOT/AST 19.0 
IU/LSGPT/ALT 21.0 IU/LALK PHOS 55.0 IU/LTOTAL PROTEIN 6.50 g/dLALBUMIN 3.90 
g/dLTOTAL BILI 0.50 mg/dLCALCIUM 9.60 mg/dLAGE 65 GFR NonAA 38 GFR AA 46 eGFR 38
 eGFR AA* 46 TRIGLYCERIDES 121.0 mg/dLCHOLESTEROL 192.0 mg/dLHDL 51.0 mg/dLTOT 
CHOL/HDL 3.8 .0 mg/dLFREE T4 0.79 TSH 1.210 uIU/mLHemoglobin A1c 5.40 
%Estim. Avg Glu (eAG) 108 

 

                          3/15/2016 8:08 AM         VITAMIN B12 696.0 pg/mL

 

                          3/23/2016 8:26 AM         WBC 7.0 RBC 3.61 HGB 11.80 g/dLHCT 37.70 %.0 fLMCH 32.70

 pgMCHC 31.30 g/dLRDW SD 49 RDW CV 12.50 %MPV 10.0 fLPLT 207 NRBC# 0.00 NRBC% 
0.0 %NEUT 73.60 %%LYMP 16.40 %%MONO 6.60 %%EOS 3.0 %%BASO 0.30 %#NEUT 5.15 #LYMP
 1.15 #MONO 0.46 #EOS 0.21 #BASO 0.02 MANUAL DIFF NOT IND Lithium 0.940 
mmol/LGLUCOSE 108.0 mg/dLSODIUM 143.0 mmol/LPOTASSIUM 4.30 mmol/LCHLORIDE 110.0 
mmol/LCO2 27.0 mmol/LBUN 16.0 mg/dLCREATININE 1.60 mg/dLSGOT/AST 13.0 
IU/LSGPT/ALT 7.0 IU/LALK PHOS 71.0 IU/LTOTAL PROTEIN 6.80 g/dLALBUMIN 4.0 
g/dLTOTAL BILI 0.20 mg/dLCALCIUM 10.40 mg/dLAGE 65 GFR NonAA 32 GFR AA 39 eGFR 
32 eGFR AA* 39 TRIGLYCERIDES 113.0 mg/dLCHOLESTEROL 169.0 mg/dLHDL 42.0 mg/dLTOT
 CHOL/HDL 4.0 LDL (CALC) 104.0 mg/dLFREE T4 0.86 TSH 2.20 uIU/mLHemoglobin A1c 
5.20 %Estim. Avg Glu (eAG) 103 

 

                          3/25/2016 9:17 AM         COLOR YELLOW APPEARANCE CLEAR SPEC GRAV 1.010 pH 7.0 PROTEIN 

NEGATIVE GLUCOSE NEGATIVE mg/dLKETONE NEGATIVE BILIRUBIN NEGATIVE BLOOD NEGATIVE
 NITRITE NEGATIVE [place] SCREEN SMALL MICRO IND? SEE BELOW WBC/HPF 0-5 RBC/HPF 
NEGATIVE CASTS/LPF NEGATIVE /LPFCRYSTALS NEGATIVE MUCOUS THRDS NEGATIVE BACTERIA
 NEGATIVE EPITH CELLS FEW SQUAMOUS /HPFTRICHOMONAS NEGATIVE YEAST NEGATIVE 

 

                          2016 6:00 AM        GLUCOSE 91.0 mg/dLSODIUM 143.0 mmol/LPOTASSIUM 3.60 mmol/LCHLORIDE

 112.0 mmol/LCO2 23.0 mmol/LBUN 22.0 mg/dLCREATININE 1.20 mg/dLSGOT/AST 15.0 
IU/LSGPT/ALT 12.0 IU/LALK PHOS 61.0 IU/LTOTAL PROTEIN 5.40 g/dLALBUMIN 3.10 
g/dLTOTAL BILI 0.40 mg/dLCALCIUM 8.40 mg/dLAGE 66 GFR NonAA 45 GFR AA 55 eGFR 45
 eGFR AA* 55 WBC 3.0 RBC 3.05 HGB 9.80 g/dLHCT 32.10 %.0 fLMCH 32.10 
pgMCHC 30.50 g/dLRDW SD 54 RDW CV 14.20 %MPV 10.10 fLPLT 170 NRBC# 0.00 NRBC% 
0.0 %NEUT 50.70 %%LYMP 32.60 %%MONO 10.50 %%EOS 5.90 %%BASO 0.30 %#NEUT 1.54 
#LYMP 0.99 #MONO 0.32 #EOS 0.18 #BASO 0.01 MANUAL DIFF NOT IND 

 

                          2016 2:09 PM        COLOR YELLOW APPEARANCE CLEAR SPEC GRAV 1.010 pH 5.0 PROTEIN

 30 GLUCOSE NEGATIVE mg/dLKETONE NEGATIVE BILIRUBIN NEGATIVE BLOOD LARGE NITRITE
 NEGATIVE [place] SCREEN MODERATE MICRO INDICATED? SEE BELOW WBC/HPF  RBC/HPF
 20-50 CASTS/LPF NEGATIVE /LPFCRYSTALS NEGATIVE MUCOUS THRDS NEGATIVE BACTERIA 
NEGATIVE EPITH CELLS FEW SQUAMOUS /HPFTRICHOMONAS NEGATIVE YEAST NEGATIVE CULT 
SET UP? YES 

 

                          1/3/2017 4:08 PM          COLOR YELLOW APPEARANCE HAZY SPEC GRAV 1.015 pH 6.0 PROTEIN 30

 GLUCOSE NEGATIVE mg/dLKETONE NEGATIVE BILIRUBIN NEGATIVE BLOOD MODERATE NITRITE
 NEGATIVE [place] SCREEN LARGE MICRO INDICATED? SEE BELOW WBC/-200 RBC/HPF 
5-10 CASTS/LPF NEGATIVE /LPFCRYSTALS NEGATIVE MUCOUS THRDS NEGATIVE BACTERIA 
NEGATIVE EPITH CELLS 1+ SQUAMOUS /HPFTRICHOMONAS NEGATIVE YEAST NEGATIVE CULT 
SET UP? YES 

 

                          2017 4:24 PM         CREATININE 1.50 mg/dLAGE 66 GFR NonAA 35 GFR AA 42 eGFR 35 eGFR

 AA* 42 VITAMIN B12 1324.0 pg/mL

 

                          2017 11:30 AM         GLUCOSE 93.0 mg/dLSODIUM 143.0 mmol/LPOTASSIUM 3.10 mmol/LCHLORIDE

 101.0 mmol/LCO2 29.0 mmol/LBUN 26.0 mg/dLCREATININE 1.50 mg/dLSGOT/AST 23.0 
IU/LSGPT/ALT 13.0 IU/LALK PHOS 66.0 IU/LTOTAL PROTEIN 7.70 g/dLALBUMIN 4.30 
g/dLTOTAL BILI 0.40 mg/dLCALCIUM 10.30 mg/dLAGE 66 GFR NonAA 35 GFR AA 42 eGFR 
35 eGFR AA* 42 TRIGLYCERIDES 147.0 mg/dLCHOLESTEROL 184.0 mg/dLHDL 44.0 mg/dLTOT
 CHOL/HDL 4.2 .0 mg/dLWBC 5.4 RBC 3.98 HGB 12.90 g/dLHCT 40.20 %.0
 fLMCH 32.40 pgMCHC 32.10 g/dLRDW SD 50 RDW CV 13.50 %MPV 9.30 fLPLT 210 NRBC# 
0.00 NRBC% 0.0 %NEUT 54.20 %%LYMP 30.70 %%MONO 9.10 %%EOS 5.20 %%BASO 0.40 
%#NEUT 2.94 #LYMP 1.66 #MONO 0.49 #EOS 0.28 #BASO 0.02 MANUAL DIFF NOT IND FREE 
T4 1.09 COLOR YELLOW APPEARANCE CLEAR SPEC GRAV <=1.005 pH 5.5 PROTEIN NEGATIVE 
GLUCOSE NEGATIVE mg/dLKETONE NEGATIVE BILIRUBIN NEGATIVE BLOOD NEGATIVE NITRITE 
NEGATIVE LEUK SCREEN LARGE MICRO INDICATED? SEE BELOW WBC/HPF 10-20 RBC/HPF 0-5 
CASTS/LPF NEGATIVE /LPFCRYSTALS NEGATIVE MUCOUS THRDS NEGATIVE BACTERIA FEW 
EPITH CELLS 1+ SQUAMOUS /HPFTRICHOMONAS NEGATIVE YEAST NEGATIVE CULT SET UP? YES
 TSH 1.820 uIU/mL

 

                          2017 2:45 PM         COLOR YELLOW APPEARANCE CLEAR SPEC GRAV <=1.005 pH 6.0 PROTEIN

 NEGATIVE GLUCOSE NEGATIVE mg/dLKETONE NEGATIVE BILIRUBIN NEGATIVE BLOOD TRACE-
INTACT NITRITE NEGATIVE [place] SCREEN SMALL MICRO INDICATED? SEE BELOW WBC/HPF 0-5
 RBC/HPF NEGATIVE CASTS/LPF NEGATIVE /LPFCRYSTALS NEGATIVE MUCOUS THRDS NEGATIVE
 BACTERIA FEW EPITH CELLS FEW SQUAMOUS /HPFTRICHOMONAS NEGATIVE YEAST NEGATIVE 
CULT SET UP? YES 

 

                          2017 11:22 AM        COLOR YELLOW APPEARANCE CLEAR SPEC GRAV <=1.005 pH 6.5 PROTEIN

 NEGATIVE GLUCOSE NEGATIVE mg/dLKETONE NEGATIVE BILIRUBIN NEGATIVE BLOOD 
NEGATIVE NITRITE NEGATIVE LEUK SCREEN NEGATIVE MICRO INDICATED? NOT INDICATED 

 

                          2017 2:18 PM         Clarity Ur cloudy Color Ur dark yellow Glucose Ur-sCnc negative

 Bilirub Ur Ql Strip negative Ketones Ur Ql Strip negative Sp Gr Ur Qn 1.010 Hgb
 Ur Ql Strip trace-intact pH Ur-LsCnc 6.5 Prot Ur Ql Strip trace Urobilinogen 
Ur-mCnc 0.2 Nitrite Ur Ql Strip negative WBC Est Ur Ql Strip large 

 

                          2017 9:28 AM         GLUCOSE 109.0 mg/dLSODIUM 142.0 mmol/LPOTASSIUM 3.60 mmol/LCHLORIDE

 106.0 mmol/LCO2 23.0 mmol/LBUN 11.0 mg/dLCREATININE 1.30 mg/dLCALCIUM 9.80 
mg/dLAGE 66 GFR NonAA 41 GFR AA 50 eGFR 41 eGFR AA* 50 

 

                          2017 9:52 AM         COLOR YELLOW APPEARANCE CLOUDY SPEC GRAV <=1.005 pH 6.5 PROTEIN

 NEGATIVE GLUCOSE NEGATIVE mg/dLKETONE NEGATIVE BILIRUBIN NEGATIVE BLOOD SMALL 
NITRITE POSITIVE [place] SCREEN LARGE MICRO INDICATED? SEE BELOW WBC/-300 
RBC/HPF 5-10 CASTS/LPF NEGATIVE /LPFCRYSTALS NEGATIVE MUCOUS THRDS NEGATIVE 
BACTERIA 2++ EPITH CELLS 1+ SQUAMOUS /HPFTRICHOMONAS NEGATIVE YEAST NEGATIVE 
CULT SET UP? YES 

 

                          2017 10:41 AM         COLOR YELLOW APPEARANCE CLEAR SPEC GRAV <=1.005 pH 6.0 PROTEIN

 NEGATIVE GLUCOSE NEGATIVE mg/dLKETONE NEGATIVE BILIRUBIN NEGATIVE BLOOD 
NEGATIVE NITRITE NEGATIVE [place] SCREEN TRACE MICRO INDICATED? SEE BELOW WBC/HPF 
0-5 RBC/HPF RARE CASTS/LPF NEGATIVE /LPFCRYSTALS NEGATIVE MUCOUS THRDS NEGATIVE 
BACTERIA FEW EPITH CELLS 1+ SQUAMOUS /HPFTRICHOMONAS NEGATIVE YEAST NEGATIVE 
CULT SET UP? NO 







History Of Immunizations







       Name    Date Admin    Mfg Name    Mfg Code    Trade Name    Lot#    Route    Inj    Vis Given    Vis

 Pub                                    CVX

 

        Influenza    2008    Not Entered    NE      Not Entered            Not Entered    Not Entered

                    1            999

 

        X       12/19/2008    [employer]    MSD     Pneumovax 23            Intramuscular    Not Entered

                    1            999

 

           Influenza    10/15/2010    [employer].    NOV        Fluvirin > 12 Years    

[id number]     Intramuscular    Left Deltoid    10/15/2010    2009    999

 

          X         3/23/2015    [facility]       Prevnar 13    [id number]    Intramuscular

                Right Gluteous Medius    3/23/2015       2013       109







History of Past Illness







                    Name                Date of Onset       Comments

 

                    Peritoneal Neoplasm, Malignant                         

 

                    Hyperlipidemia                           

 

                    Bipolar disorder, unspecified                         

 

                    Artificial opening status; colostomy                         

 

                    B12 deficiency                           

 

                    Anemia, Pernicious                         

 

                    Arthritis unspecified                         

 

                    cervical cancer                          

 

                    Artificial opening status; colostomy    2010  1:10PM     

 

                    Bipolar disorder, unspecified    2010  1:10PM     

 

                    Hyperlipidemia      2010  1:10PM     

 

                    Anemia, Pernicious    2010  1:10PM     

 

                    Postoperative Follow-Up    2010  1:55PM     

 

                    Postoperative Follow-Up    Mar  8 2010 10:57AM     

 

                    Artificial opening status; colostomy    Mar  8 2010  1:19PM     

 

                    Peritoneal Neoplasm, Malignant    Mar  8 2010  1:19PM     

 

                    Artificial opening status; colostomy    2010  1:40PM     

 

                    Hyperlipidemia      2010  1:40PM     

 

                    Anemia, Pernicious    2010  1:40PM     

 

                    Peritoneal Neoplasm, Malignant    2010  1:40PM     

 

                    Arthritis unspecified    2010  1:40PM     

 

                    Anemia of Chronic Illness    2010  1:40PM     

 

                    Tinea corporis      2010  3:17PM     

 

                    Bipolar disorder, unspecified    2010  1:33PM     

 

                    Hyperlipidemia      2010  1:33PM     

 

                    Anemia, Pernicious    2010  1:33PM     

 

                    Peritoneal Neoplasm, Malignant    2010  1:33PM     

 

                    B12 deficiency      2010  1:33PM     

 

                    Ethmoidal Sinusitis, Acute    Sep 21 2010  3:53PM     

 

                    Wheezing            Sep 21 2010  3:53PM     

 

                    Flu                 Oct 15 2010  1:40PM     

 

                    Bipolar disorder, unspecified    Oct 15 2010  1:42PM     

 

                    Hyperlipidemia      Oct 15 2010  1:42PM     

 

                    Anemia, Pernicious    Oct 15 2010  1:42PM     

 

                    Peritoneal Neoplasm, Malignant    Oct 15 2010  1:42PM     

 

                    Bipolar disorder, unspecified    2011 12:01PM     

 

                    Hyperlipidemia      2011 12:01PM     

 

                    Anemia, Pernicious    2011 12:01PM     

 

                    Peritoneal Neoplasm, Malignant    2011 12:01PM     

 

                    Bipolar disorder, unspecified    Apr 15 2011 10:55AM     

 

                    Major Depression    2011 10:11AM     

 

                    Bipolar Disorder    2011 10:11AM     

 

                    Cancer              May 10 2011  4:16PM     

 

                    Major Depression    May 10 2011  3:16PM     

 

                    Bipolar Disorder    May 10 2011  3:16PM     

 

                    Hypercalcemia       May 23 2011  2:47PM     

 

                    Bipolar disorder, unspecified    May 23 2011  2:47PM     

 

                    Colon Cancer, Personal History    May 23 2011  2:47PM     

 

                    Bipolar Disorder    May 31 2011  4:39PM     

 

                    Depressive Disorder    2011 10:01AM     

 

                    Vitamin B12 deficiency    2011 10:01AM     

 

                    Vitamin D Deficiency    2011  5:07PM     

 

                    Anemia, Vitamin B12 Deficiency    2011  5:07PM     

 

                    B12 deficiency      2011  3:56PM     

 

                    Routine gynecological examination    Aug  4 2011  9:08AM     

 

                    Screening Examination for Breast Cancer    Aug  4 2011  9:08AM     

 

                    Tinea Corporis      Aug  4 2011  9:08AM     

 

                    Depressive Disorder    Sep 23 2011  8:47AM     

 

                    Contact Dermatitis    Sep 23 2011  8:47AM     

 

                    Anemia, Pernicious    Sep 23 2011  8:47AM     

 

                    B12 deficiency      Sep 23 2011  8:47AM     

 

                    B12 deficiency      Sep 27 2011  2:58PM     

 

                    B12 deficiency      Oct 20 2011  2:34PM     

 

                    Flu                 Dec  9 2011  3:16PM     

 

                    B12 deficiency      Dec  9 2011  3:17PM     

 

                    B12 deficiency      2012  4:52PM     

 

                    B12 deficiency      b 2012 11:10AM     

 

                    B12 deficiency      2012  3:37PM     

 

                    B12 deficiency      May  3 2012  4:10PM     

 

                    B12 deficiency      2012  2:54PM     

 

                    B12 deficiency      2012 11:23AM     

 

                    B12 deficiency      Aug  9 2012  2:08PM     

 

                    B12 deficiency      Sep  6 2012  4:36PM     

 

                    B12 deficiency      Oct 16 2012 10:23AM     

 

                    Flu                 Feb  4   3:11PM     

 

                    Bipolar disorder, unspecified    Feb  2013  2:48PM     

 

                    Anemia, Pernicious    Feb  2013  2:48PM     

 

                    B12 deficiency      Feb  2013  2:48PM     

 

                    Extrapyramidal abnormal movement disorder    Feb  4   2:48PM     

 

                    B12 deficiency      Apr  3 2013 12:03PM     

 

                    Bipolar disorder, unspecified    May  7 2013  1:31PM     

 

                    Anemia, Pernicious    May  7 2013  1:31PM     

 

                    B12 deficiency      May  7 2013  1:31PM     

 

                    Extrapyramidal abnormal movement disorder    May  7 2013  1:31PM     

 

                    B12 deficiency      2013  3:42PM     

 

                    B12 deficiency      2013  1:31PM     

 

                    Hyperlipidemia      Aug  7 2013 10:37AM     

 

                    Vitamin D Deficiency    Aug  7 2013 10:37AM     

 

                    Bipolar disorder, unspecified    Aug  7 2013 10:37AM     

 

                    Anemia, Pernicious    Aug  7 2013 10:37AM     

 

                    B12 deficiency      Aug  7 2013 10:37AM     

 

                    B12 deficiency      Sep 25 2013 11:15AM     

 

                    B12 deficiency      Dec 11 2013  3:16PM     

 

                    B12 deficiency      Mar  6 2014  1:48PM     

 

                    B12 deficiency      May 21 2014  3:17PM     

 

                    Screening Examination for Breast Cancer    2014  3:23PM     

 

                    Periumbilical abdominal pain    2014  3:23PM     

 

                    B12 deficiency      Jul 10 2014  2:52PM     

 

                    Anemia, Vitamin B12 Deficiency    Aug 13 2014  4:50PM     

 

                    Bipolar disorder    Oct 16 2014 11:13AM     

 

                    Hyperlipidemia      Oct 16 2014 11:13AM     

 

                    Anemia, Pernicious    Oct 16 2014 11:13AM     

 

                    Peritoneal Neoplasm, Malignant    Oct 16 2014 11:13AM     

 

                    Screening breast examination    Oct 16 2014 11:13AM     

 

                    Weight loss         Oct 16 2014 11:13AM     

 

                    Anemia, Pernicious    Mar 23 2015  2:57PM     

 

                    B12 deficiency      Mar 23 2015  2:57PM     

 

                    Need for Prevnar vaccine    Mar 23 2015  2:57PM     

 

                    Bipolar disorder    Mar 23 2015  2:57PM     

 

                    Hyperlipidemia      Mar 23 2015  2:57PM     

 

                    Anemia, Pernicious    Mar 23 2015  2:57PM     

 

                    Peritoneal Neoplasm, Malignant    Mar 23 2015  2:57PM     

 

                    B12 deficiency      May  4 2015  4:48PM     

 

                    Hyperlipidemia      May 13 2015  9:56AM     

 

                    Anemia              May 13 2015  9:56AM     

 

                    Bipolar disorder    May 13 2015  9:56AM     

 

                    Bipolar disorder    May 14 2015  3:27PM     

 

                    Hyperlipidemia      May 14 2015  3:27PM     

 

                    Anemia, Pernicious    May 14 2015  3:27PM     

 

                    Peritoneal Neoplasm, Malignant    May 14 2015  3:27PM     

 

                    B12 deficiency      2015  2:20PM     

 

                    B12 deficiency      2015 11:34AM     

 

                    B12 deficiency      Aug 18 2015  9:06AM     

 

                    Tinea Corporis      Sep 18 2015  8:54AM     

 

                    B12 deficiency      Sep 18 2015  8:54AM     

 

                    B12 deficiency      2015 10:28AM     

 

                    Herpes zoster without complication    Dec  3 2015  9:52AM     

 

                    B12 deficiency      Dec 23 2015 11:21AM     

 

                    B12 deficiency      2016  4:51PM     

 

                    Vitamin B 12 deficiency    Mar 14 2016  5:35PM     

 

                    B12 deficiency      Mar 15 2016 12:14PM     

 

                    B12 deficiency      May  5 2016 11:30AM     

 

                    Edema               May  5 2016 11:30AM     

 

                    Dermatitis          May  5 2016 11:30AM     

 

                    Edema               May 17 2016  8:38AM     

 

                    Shortness of breath    May 17 2016  8:38AM     

 

                    Bilateral edema of lower extremity    2016  2:06PM     

 

                    B12 deficiency      2016  2:06PM     

 

                    B12 deficiency      2016 11:50AM     

 

                    B12 deficiency      2016 11:20AM     

 

                    Diarrhea            Aug  2 2016  3:13PM     

 

                    B12 deficiency      Aug 24 2016 11:10AM     

 

                    Encounter for screening mammogram for breast cancer    Aug 24 2016 11:44AM     

 

                    B12 deficiency      Sep 28 2016  2:35PM     

 

                    B12 deficiency      Dec 15 2016  2:02PM     

 

                    Dysuria             Dec 29 2016 12:14PM     

 

                    Hematuria           Fidencio  3 2017  1:33PM     

 

                    Constipation by delayed colonic transit    2017  1:52PM     

 

                    Ileus               2017  1:52PM     

 

                    UTI (urinary tract infection)    Fidencio 15 2017  3:39PM     

 

                    Acute cystitis with hematuria    2017 11:07AM     

 

                    B12 deficiency      2017 11:07AM     

 

                    B12 deficiency      2017 11:40AM     

 

                    B12 deficiency      2017  4:07PM     

 

                    Slurred speech      2017  3:07PM     

 

                    Vitamin B12 deficiency    2017  3:07PM     

 

                    Dysphagia, unspecified type    2017  3:07PM     

 

                    Hyperlipidemia      2017  3:07PM     

 

                    Dysuria             2017 12:01PM     

 

                    B12 deficiency      2017  2:08PM     

 

                    Dysuria             2017 10:58AM     

 

                    Hematuria           May 22 2017  1:36PM     

 

                    Depressive Disorder    May 22 2017  1:36PM     

 

                    Constipation        May 22 2017  1:36PM     

 

                    Fatigue             May 22 2017  1:36PM     

 

                    Urinary tract infection    May 30 2017  9:36AM     

 

                    Hypokalemia         May 30 2017 12:03PM     

 

                    Urinary tract infection    2017  4:36PM     







Payers







           Insurance Name    Company Name    Plan Name    Plan Number    Policy Number    Policy Group

 Number                                 Start Date

 

                    Medicare RHC Medicare RHC              [id number]              N/A

 

                          [employer]    [employer]                 [id number]

                                                    

 

                    Medicare Part A    Medicare - Lab/Xray              [id number]              2006

 

                    Medicare Part B    Medicare [place]              [id number]              2006

 

                          [employer]    [employer]                 50 percent

                                                    2009

 

                    [facility]    [employer]              [id number]              N/A

 

                    Medicare Part A    Medicare Part A              [id number]              N/A

 

                    Medicare Part A    Medicare Part A              [id number]              2006









History of Encounters







                    Visit Date          Visit Type          Provider

 

                    2017           [facility]            Pranav Angel MD

 

                    2017           Office visit        Bhupinder Aspen DO

 

                    2017           Nurse visit         Bhupinder Aspen DO

 

                    2017           Office visit         

 

                    2017           Office visit        Bhupinder Aspen DO

 

                    2017           Nurse visit         Bhupidner Aspen DO

 

                    2017           Office visit        Radha Ontiveros APRN

 

                    1/15/2017           Office visit        Aj Tapia NP

 

                    2017            Office visit        Devin Masterson MD

 

                    2016          [facility]            Devin Masterson MD

 

                    12/15/2016          Nurse visit         Bhupinder Aspen DO

 

                    2016           Nurse visit         Bret Forte APRN

 

                    2016           Nurse visit         Bhupinder Aspen DO

 

                    2016            Office visit        Bhupinder Aspen DO

 

                    2016           Nurse visit         Bhupinder Aspen DO

 

                    2016           Office visit        Bret Forte APRN

 

                    2016            Office visit        Bhupinder Aspen DO

 

                    3/15/2016           Nurse visit         Bhupinder Aspen DO

 

                    2016            Nurse visit         Bhupinder Aspen DO

 

                    2015          Nurse visit         Bhupinder Aspen DO

 

                    12/3/2015           Office visit        Bhupinder Aspen DO

 

                    2015          Nurse visit         Bhupinder Aspen DO

 

                    2015           Office visit        Bhupinder Aspen DO

 

                    2015           Nurse visit         Bhupinder Aspen DO

 

                    2015            Nurse visit         Bhupinder Aspen DO

 

                    2015            Nurse visit         Bhupinder Aspen DO

 

                    2015           Office visit        Bhupinder Aspen DO

 

                    2015            Nurse visit         Bhupinder Aspen DO

 

                    3/23/2015           Office visit        Bhupinder Aspen DO

 

                    10/16/2014          Office visit        Bhupinder Aspen DO

 

                    2014           Nurse visit         Radha Ontiveros APRN

 

                    7/10/2014           Nurse visit         Bhupinder Aspen DO

 

                    2014           Office visit        Bhupinder Aspen DO

 

                    2014           Nurse visit         Bhupinder Aspen DO

 

                    3/6/2014            Nurse visit         Bhupinder Aspen DO

 

                    2014            [facility]            EARNEST Lopez MD

 

                    2013          Nurse visit         Bhupinder Aspen DO

 

                    2013           Nurse visit         Bhupinder Aspen DO

 

                    2013            Office visit        Bhupinder Aspen DO

 

                    2013            Nurse visit         Bhupinder Aspen DO

 

                    2013            Nurse visit         Bhupinder Aspen DO

 

                    2013            Office visit        Bhupinder Aspen DO

 

                    4/3/2013            Nurse visit         Bhupinder Aspen DO

 

                    2013            Office visit        Bhupidner Aspen DO

 

                    10/16/2012          Nurse visit         Bhupinder Aspen DO

 

                    2012            Nurse visit         Bhupinder Aspen DO

 

                    2012            Voided              Bhupinder Aspen DO

 

                    2012            Nurse visit         Bhupinder Aspen DO

 

                    2012            Nurse visit         Bhupinder Aspen DO

 

                    2012           Nurse visit         Bhupinder Aspen DO

 

                    5/3/2012            Nurse visit         Bhupinder Aspen DO

 

                    2012           Nurse visit         Bhupinder Aspen DO

 

                    2012           Nurse visit         Bhupinder Aspen DO

 

                    2012           Nurse visit         Bhupinder Aspen DO

 

                    2011           Nurse visit         Bhupinder Aspen DO

 

                    10/20/2011          Nurse visit         Bhupinder Aspen DO

 

                    2011           Office visit        Bhupinder Aspen DO

 

                    2011           Nurse visit         Radha GREEN

 

                    2011            Office visit        Bhupinder Aspen DO

 

                    2011           Nurse visit         Bhupinder Aspen DO

 

                    2011            Office visit        Bhupinder Aspen DO

 

                    2011           Office visit        Buhpinder Aspen DO

 

                    5/10/2011           Office visit        Bhupinder Aspen DO

 

                    2011           Office visit        Bhupinder Aspen DO

 

                    4/15/2011           Office visit        Devin Angel DO

 

                    2011           Office visit        Devin Angel DO

 

                    10/15/2010          Office visit        Devin Angel DO

 

                    2010           Office visit        Devin Angel DO

 

                    2010            Office visit        Devin Angel DO

 

                    2010           Office visit        Devin Angel DO

 

                    2010            Office visit        Devin Angel DO

 

                    3/8/2010            Office visit        Devin Masterson MD

 

                    2010            Surgery             Devin Masterson MD

 

                    2010            Office visit        Devin Angel DO

 

                    2010           Surgery             Devin Masterson MD

 

                    2010           Hospital            Devin Masterson MD

 

                    2010           [facility]            Devin Masterson MD

 

                    10/22/2009          Office visit        Devin Angel DO

## 2019-06-26 NOTE — XMS REPORT
MU2 Ambulatory Summary

                             Created on: 2017



Pauline Gan

External Reference #: [id number]

: 1950

Sex: Female



Demographics







                          Address                   [address]

[place]  [id number]

 

                          Home Phone                [phone]

 

                          Preferred Language        English

 

                          Marital Status            Legally 

 

                          Latter day Affiliation     Unknown

 

                          Race                      White

 

                          Ethnic Group              Not  or 





Author







                          Bhupinder Aguilar

 

                          [facility]

 

                          Address                   [address]

[place]  [id number]



 

                          Phone                     [phone]







Care Team Providers







                    Care Team Member Name    Role                Phone

 

                    Bhupinder Louise    PCP                 Unavailable

 

                    Bhupinder Louise    PreferredProvider    Unavailable







Allergies and Adverse Reactions







                    Name                Reaction            Notes

 

                    NO KNOWN DRUG ALLERGIES                         







Plan of Treatment







             Planned Activity    Comments     Planned Date    Planned Time    Plan/Goal

 

             Injection,Subcutaneous/Intramuscul, C Medicare                 2017    12:00 AM      

 

             Urinalysis with C/S If Indicated.                 2017    12:00 AM      







Medications







                                        Active 

 

             Name         Start Date    Estimated Completion Date    SIG          Comments

 

                Latuda 20 mg oral tablet                                    take 1 tablet (20 mg) by oral route once daily with

 food (at least 350 calories)            

 

             pravastatin 40 mg oral tablet    3/30/2015                 TAKE 1 TABLET BY MOUTH DAILY     

 

                Namenda XR 28 mg oral capsule,sprinkle,ER 24hr    2015                       take 1 capsule (28

 mg) by oral route once daily            

 

                Namenda XR 28 mg oral capsule,sprinkle,ER 24hr    2016                       take 1 capsule (28

 mg) by oral route once daily            

 

                potassium chloride 10 mEq oral tablet extended release    2016                       take 1 tablet

 (10 meq) by oral route once daily       

 

             pravastatin 40 mg oral tablet    2016                 TAKE 1 TABLET BY MOUTH DAILY     

 

                Vitamin B-12 1,000 mcg/mL injection solution    2016                       inject 1 milliliter 

(1,000 mcg) by intramuscular route once a month     

 

                potassium chloride 10 mEq oral tablet extended release    2016                      take 1 tablet

 (10 meq) by oral route once daily       

 

                Namenda XR 28 mg oral capsule,sprinkle,ER 24hr    2016                      TAKE 1 CAPSULE BY

 MOUTH EVERY DAY                         

 

                furosemide 40 mg oral tablet    2016                      take 1 tablet (40 mg) by oral route

 once daily                              

 

                mirtazapine 45 mg oral tablet                                    take 1 tablet (45 mg) by oral route once daily

 before bedtime                          

 

             Fish Oil 300-1,000 mg oral capsule                              take 1 capsule by oral route daily     

 

             Vitamin D3 1,000 unit oral tablet                              take 1 tablet by oral route daily     

 

                Calcium 600 600 mg calcium (1,500 mg) oral tablet                                    take 1 tablet by oral route

 daily                                   

 

                Aspirin Low Dose 81 mg oral tablet,delayed release (DR/EC)                                    take 1 tablet 

(81 mg) by oral route once daily         

 

                metoclopramide HCl 10 mg oral tablet    2017                       take 1 tablet by oral route 

2 times a day for 50 days                









                                         

 

             Name         Start Date    Expiration Date    SIG          Comments

 

             Reglan 10mg    3/29/2010    2010    one ac and hs     

 

                Keflex 500 mg oral capsule    2010       10/1/2010       take 1 capsule (500 mg) by oral

 route every 6 hours for 10 days         

 

                Bactrim -160 mg oral tablet    2011       take 1 tablet by oral route

 every 12 hours for 7 days               

 

                triamcinolone acetonide 0.1 % topical cream    2011      apply a thin

 layer to the affected area(s) by topical route 2 times per day     

 

                sertraline 100 mg oral tablet    4/10/2012       5/10/2012       take 1.5 tablets by oral route

 daily for 30 days                       

 

                ergocalciferol (vitamin D2) 50,000 unit oral capsule    4/15/2013       2013       TAKE

 ONE CAPSULE BY MOUTH ONCE A WEEK        

 

                CYANOCOBALAM 1000MCGINJ 1000 milliliter    2013       INJECT 1ML INTRAMUSCULAR

 ONCE A MONTH                            

 

                pravastatin 40 mg oral tablet    3/25/2014       3/20/2015       TAKE ONE TABLET BY MOUTH EVERY

 DAY                                     

 

                          Zostavax (PF) 19,400 unit/0.65 mL subcutaneous suspension for reconstitution    3/23/2015

                    3/24/2015           inject 0.65 milliliter by subcutaneous route once     

 

                famciclovir 500 mg oral tablet    12/3/2015       12/10/2015      take 1 tablet (500 mg) by

 oral route every 8 hours for 7 days     

 

                furosemide 40 mg oral tablet    2016      take 1 tablet (40 mg) by oral

 route once daily                        

 

                Cipro 500 mg oral tablet    2016       take 1 tablet (500 mg) by oral route

 2 times per day for 5 days              

 

                Bactrim -160 mg oral tablet    2016        take 1 tablet by oral route

 every 12 hours for 7 days               

 

                metoclopramide HCl 10 mg oral tablet    2017       take 1 tablet by oral

 route 2 times a day for 50 days         

 

                Macrobid 100 mg oral capsule    2017       take 1 capsule (100 mg) by oral

 route 2 times per day with food for 7 days     

 

                Augmentin 875-125 mg oral tablet    2017       take 1 tablet by oral route

 every 12 hours for 7 days               

 

                amoxicillin 500 mg oral tablet    2017       take 1 tablet (500 mg) by oral

 route every 12 hours for 7 days         

 

                cefuroxime axetil 500 mg oral tablet    2017       take 1 tablet (500 mg)

 by oral route 2 times per day for 7 days     









                                        Discontinued 

 

             Name         Start Date    Discontinued Date    SIG          Comments

 

                Tylenol 325 mg oral tablet                    2013        take 1 - 2 tablets (325 -650 mg) by oral

 route every 4-6 hours as needed         

 

                Calcium 600 + D(3) 600 mg(1,500mg) -400 unit oral tablet                    2011       take 1 tablet

 by oral route 2 times a day            no longer taking

 

                Vitamin B-12 1,000 mcg oral tablet extended release    2010       take 1

 tablet by oral route daily             no longer taking

 

                Antifungal (clotrimazole) 1 % topical cream    2010       apply to the 

affected and surrounding areas of skin by topical route 2 times per day morning 
and evening                              

 

                sertraline 100 mg oral tablet    5/10/2011       2011       take 2 tablets (200 mg) by 

oral route once daily                   discontinued by Dr. Serrano

 

                mirtazapine 15 mg oral tablet                    2011        take 1 tablet (15 mg) by oral route 

once daily before bedtime               Dr. Serrano

 

                mirtazapine 15 mg oral tablet                    2011        take 1 tablet (15 mg) by oral route 

once daily before bedtime               dc'd by Dr. Serrano

 

                Pristiq 50 mg oral tablet extended release 24 hr                    2013        take 1 tablet (50

 mg) by oral route once daily           Dr. Serrano

 

                Pristiq 50 mg oral tablet extended release 24 hr                    2013        take 1 tablet (50

 mg) by oral route once daily           dose updated

 

                Vitamin B-12 1,000 mcg/mL injection solution    2011        inject 1 milliliter

 (1,000 mcg) by intramuscular route once a month    on list already

 

                    syringe with needle 1 mL 25 gauge x 1" miscellaneous syringe    2011

                          use for injection once a month     

 

                clotrimazole 1 % topical cream    2011        apply to the affected and surrounding

 areas of skin by topical route 2 times per day in the morning and evening     

 

                Vitamin D2 50,000 unit oral capsule    2011        take 1 capsule (50,000

 unit) by oral route once weekly        generic on list

 

                Pravachol 40 mg oral tablet    2012        take 1 tablet (40 mg) by oral 

route once daily for 90 days            generic on list

 

                lithium carbonate 300 mg oral capsule    2012        take 1 capsule by oral

 route daily                            dose updated

 

                Pristiq 100 mg oral tablet extended release 24 hr                    4/10/2012       take 1 and 1/2 

tablet (150 mg) by oral route once daily    Mental Health provider

 

                Pristiq 100 mg oral tablet extended release 24 hr                    4/10/2012       take 1 and 1/2 

tablet (150 mg) by oral route once daily    Discontinued by Dr Efrain Knight at [facility]

 

                hydroxyzine HCl 50 mg oral tablet    10/16/2014      2015       take 1 tablet (50 mg) 

by oral route at bedtime                 

 

                lithium carbonate 300 mg oral capsule    2015       take 1 capsule (300

 mg) by oral route 2 for 30 days         

 

                fluconazole 100 mg oral tablet    2015       12/3/2015       take 1 tablet (100 mg) by 

oral route once a week                   

 

                ketoconazole 2 % topical cream    2015       12/3/2015       apply to the affected area(s)

 by topical route 2 times per day        

 

                prednisone 10 mg oral tablet    12/3/2015       2016        take 2 tablets (20 mg) by oral

 route once daily for 4 days 1 tablet daily for 4 days 0.5 tablet daily for 4 
days                                     

 

                triamcinolone acetonide 0.1 % topical cream    2016       apply a thin layer

 to the affected area(s) by topical route 2 times per day     

 

                Cipro 500 mg oral tablet    1/15/2017       2017       take 1 tablet (500 mg) by oral route

 every 12 hours for 10 days              







Problem List







                    Description         Status              Onset

 

                    Artificial opening status; colostomy    Active               

 

                    Bipolar disorder, unspecified    Active               

 

                    Hyperlipidemia      Active               

 

                    Peritoneal Neoplasm, Malignant    Active               

 

                    Anemia, Pernicious    Active               

 

                    Arthritis unspecified    Active               

 

                    B12 deficiency      Active               







Vital Signs







      Date    Time    BP-Sys(mm[Hg]    BP-Lynn(mm[Hg])    HR(bpm)    RR(rpm)    Temp    WT    HT    HC    BMI

                    BSA                 BMI Percentile      O2 Sat(%)

 

        2017    3:05:00 PM    134 mmHg    70 mmHg    70 bpm    20 rpm    97.4 F    181 lbs    69 in

                          26.73 kg/m2    2.00 m2                   98 %

 

        2017    11:07:00 AM    124 mmHg    64 mmHg    62 bpm    17 rpm    98.2 F    181.2 lbs    69

 in                       26.7583 kg/m    2.0003 m                 98 %

 

        1/15/2017    3:34:00 PM    148 mmHg    89 mmHg    69 bpm    20 rpm    98.2 F    179 lbs    69 in

                          26.43 kg/m2    1.99 m2                   98 %

 

       2017    1:51:00 PM    160 mmHg    90 mmHg    100 bpm    20 rpm    96.5 F    179 lbs             

                                                                98 %

 

       2016    3:11:00 PM    134 mmHg    76 mmHg    80 bpm    20 rpm    98 F    163 lbs    69 in     

                24.07 kg/m2     1.90 m2                         98 %

 

        2016    2:04:00 PM    142 mmHg    86 mmHg    68 bpm    16 rpm    98.5 F    166 lbs    63 in

                          29.4053 kg/m    1.8295 m                 100 %

 

        2016    11:27:00 AM    148 mmHg    78 mmHg    90 bpm    20 rpm    98.2 F    153 lbs    69 in

                          22.59 kg/m2    1.84 m2                   96 %

 

        12/3/2015    9:50:00 AM    132 mmHg    70 mmHg    62 bpm    16 rpm    97.9 F    145 lbs    69 in

                          21.4125 kg/m    1.7894 m                 100 %

 

        2015    8:52:00 AM    132 mmHg    68 mmHg    52 bpm    20 rpm    97.8 F    141 lbs    69 in

                          20.82 kg/m2    1.76 m2                   100 %

 

        2015    3:25:00 PM    120 mmHg    62 mmHg    72 bpm    16 rpm    98.1 F    136 lbs    69 in

                          20.0835 kg/m    1.733 m                 98 %

 

       3/23/2015    2:55:00 PM    130 mmHg    76 mmHg    68 bpm    18 rpm    97 F    140 lbs    69 in    

                20.67 kg/m2     1.76 m2                         98 %

 

        10/16/2014    11:11:00 AM    120 mmHg    66 mmHg    77 bpm    20 rpm    98 F    130 lbs    69 in

                          19.1974 kg/m    1.6943 m                 100 %

 

        2014    3:21:00 PM    130 mmHg    66 mmHg    63 bpm    18 rpm    97.2 F    160 lbs    69 in

                          23.6276 kg/m    1.88 m2                   99 %

 

        2013    10:35:00 AM    132 mmHg    70 mmHg    66 bpm    20 rpm    98.1 F    157 lbs    69 in

                          23.18 kg/m2    1.862 m                  

 

        2013    1:29:00 PM    132 mmHg    70 mmHg    76 bpm    18 rpm    98.2 F    166 lbs    69 in 

                          24.5137 kg/m    1.91 m2                    

 

       2013    2:46:00 PM    128 mmHg    70 mmHg    76 bpm    16 rpm    98 F    160 lbs    69 in     

                23.63 kg/m2     1.8797 m                       

 

        2011    8:49:00 AM    128 mmHg    78 mmHg    70 bpm    18 rpm    97.9 F    164 lbs    69 in

                          24.2183 kg/m    1.90 m2                    

 

     2011    1:31:00 PM    132 mmHg    68 mmHg    84 bpm         97 F    167 lbs                        

                                         

 

        2011    9:09:00 AM    128 mmHg    70 mmHg    72 bpm    18 rpm    98.2 F    163 lbs    64 in 

                          27.9786 kg/m    1.83 m2                    

 

       2011    10:01:00 AM    132 mmHg    70 mmHg    72 bpm    18 rpm    98.2 F    154 lbs             

                                                                 

 

       2011    2:47:00 PM    128 mmHg    70 mmHg    72 bpm    18 rpm    97.8 F    156 lbs             

                                                                 

 

       5/10/2011    3:16:00 PM    144 mmHg    80 mmHg    72 bpm    18 rpm    98.2 F    158 lbs             

                                                                 

 

        2011    10:11:00 AM    132 mmHg    70 mmHg    70 bpm    18 rpm    98.2 F    168 lbs    69 in

                          24.809 kg/m    1.93 m2                    

 

        4/15/2011    10:52:00 AM    110 mmHg    60 mmHg    75 bpm    16 rpm    97.5 F    172.375 lbs    

69 in                     25.46 kg/m2    1.951 m                 100 %

 

        2011    11:43:00 AM    120 mmHg    82 mmHg    75 bpm    16 rpm    97.2 F    178.5 lbs    69

 in                       26.3596 kg/m    1.99 m2                   100 %

 

        10/15/2010    1:32:00 PM    120 mmHg    70 mmHg    80 bpm    18 rpm    96.6 F    177 lbs    69 in

                          26.14 kg/m2    1.977 m                 100 %

 

        2010    3:50:00 PM    168 mmHg    100 mmHg    82 bpm    18 rpm    97.8 F    177.5 lbs    69

 in                       26.2119 kg/m    1.98 m2                   97 %

 

        2010    1:21:00 PM    140 mmHg    80 mmHg    59 bpm    16 rpm    97.6 F    173.25 lbs    69 

in                        25.58 kg/m2    1.956 m                 100 %

 

        2010    3:02:00 PM    140 mmHg    80 mmHg    61 bpm    16 rpm    97.6 F    173.125 lbs    69

 in                       25.5658 kg/m    1.96 m2                   99 %

 

        2010    1:23:00 PM    130 mmHg    80 mmHg    66 bpm    16 rpm    96.8 F    173 lbs    69 in 

                          25.55 kg/m2    1.9546 m                 100 %

 

        2010    12:58:00 PM    130 mmHg    88 mmHg    75 bpm    16 rpm    98.4 F    172.25 lbs    69

 in                       25.4366 kg/m    1.95 m2                   100 %







Social History







                    Name                Description         Comments

 

                    denies alcohol use                         

 

                    denies smoking                           

 

                    Denies illicit substance abuse                         

 

                    retired                                 direct care

 

                    Single                                   

 

                    Exercises regularly                         

 

                    Attended some college                         

 

                    Tobacco             Never smoker         







History of Procedures







                    Date Ordered        Description         Order Status

 

                    2010 12:00 AM    COMPREHEN METABOLIC PANEL    Reviewed

 

                    2010 12:00 AM    COMPLETE CBC W/AUTO DIFF WBC    Reviewed

 

                    2010 12:00 AM    LIPID PANEL         Reviewed

 

                          2015 12:00 AM        B12 Injection, Up to 1000 Mcg NDC#9236-8275-88 C Medicare 

                                        Reviewed

 

                    2011 12:00 AM    MAMMOGRAM SCREENING    Reviewed

 

                    2011 12:00 AM    CYTOPATH C/V THIN LAYER    Reviewed

 

                    2011 12:00 AM    B12 Injection 1 cc NDC#[address]-61    Reviewed

 

                    2015 12:00 AM    THER/PROPH/DIAG INJ SC/IM    Reviewed

 

                    2015 12:00 AM    B12 Injection, Up to 1000 Mcg NDC#7302-5321-64    Reviewed

 

                    2011 12:00 AM    THER/PROPH/DIAG INJ SC/IM    Reviewed

 

                    2011 12:00 AM    B12 Injection([facility]) Ndc#8449-6445-42-    Reviewed

 

                    2015 12:00 AM    THER/PROPH/DIAG INJ SC/IM    Reviewed

 

                    2015 12:00 AM    B12 Injection, Up to 1000 Mcg NDC#2743-0393-77    Reviewed

 

                    10/20/2011 12:00 AM    THER/PROPH/DIAG INJ SC/IM    Reviewed

 

                    10/20/2011 12:00 AM    B12 Injection([place]) Ndc#8177-7256-40-    Reviewed

 

                    2016 12:00 AM    THER/PROPH/DIAG INJ SC/IM    Reviewed

 

                    2016 12:00 AM    B12 Injection, Up to 1000 Mcg NDC#7458-4267-72    Reviewed

 

                    3/14/2016 12:00 AM    VITAMIN B-12        Reviewed

 

                    3/15/2016 12:00 AM    THER/PROPH/DIAG INJ SC/IM    Reviewed

 

                    3/15/2016 12:00 AM    B12 Injection, Up to 1000 Mcg NDC#5182-0672-50    Reviewed

 

                    2011 12:00 AM    ***Immunization administration, Medicare flu    Reviewed

 

                    2011 12:00 AM    Fluzone ** MEDICARE Only **    Reviewed

 

                    2011 12:00 AM    THER/PROPH/DIAG INJ SC/IM    Reviewed

 

                    2011 12:00 AM    B12 Injection ([employer] [facility]) Ndc#3205-0225-49    Reviewed

 

                    2016 12:00 AM    B12 Injection, Up to 1000 Mcg NDC#6528-3780-33 [facility] Medicare    

Reviewed

 

                    2016 12:00 AM    TTE W/DOPPLER COMPLETE    Reviewed

 

                    2016 12:00 AM    EXTREMITY STUDY     Reviewed

 

                          2016 12:00 AM        B12 Injection, Up to 1000 Mcg NDC#4483-0940-65 [facility] Medicare 

                                        Reviewed

 

                    2016 12:00 AM    THER/PROPH/DIAG INJ SC/IM    Reviewed

 

                    2016 12:00 AM    B12 Injection, Up to 1000 Mcg NDC#1394-8273-24    Reviewed

 

                    2016 12:00 AM    THER/PROPH/DIAG INJ SC/IM    Reviewed

 

                    2012 12:00 AM    B12 Injection([place]) Ndc#2035-0289-08-    Reviewed

 

                    2016 12:00 AM    B12 Injection, Up to 1000 Mcg NDC#0079-3414-12    Reviewed

 

                    2016 12:00 AM    THER/PROPH/DIAG INJ SC/IM    Reviewed

 

                    2012 12:00 AM    THER/PROPH/DIAG INJ SC/IM    Reviewed

 

                    2012 12:00 AM    B12 Injection ([employer]) Ndc#3237-4824-86    Reviewed

 

                    2016 12:00 AM    THER/PROPH/DIAG INJ SC/IM    Reviewed

 

                    2016 12:00 AM    B12 Injection, Up to 1000 Mcg NDC#9902-2362-24    Reviewed

 

                    2016 12:00 AM    B12 Injection, Up to 1000 Mcg NDC#3525-2619-54    Reviewed

 

                    2016 12:00 AM    THER/PROPH/DIAG INJ SC/IM    Reviewed

 

                    2012 12:00 AM    THER/PROPH/DIAG INJ SC/IM    Reviewed

 

                    2012 12:00 AM    B12 Injection([place]) Ndc#7530-2995-60-    Reviewed

 

                    12/15/2016 12:00 AM    B12 Injection, Up to 1000 Mcg NDC#0354-3672-66    Reviewed

 

                    12/15/2016 12:00 AM    THER/PROPH/DIAG INJ SC/IM    Reviewed

 

                    2016 12:00 AM    URNLS DIP STICK/TABLET RGNT AUTO W/O MICROSCOPY    Returned

 

                    1/3/2017 12:00 AM    URNLS DIP STICK/TABLET RGNT AUTO W/O MICROSCOPY    Returned

 

                    2017 12:00 AM    URINE CULTURE/COLONY COUNT    Returned

 

                    2017 12:00 AM    Rocephin 1 gram Injection, [facility] Medicare    Reviewed

 

                    2017 12:00 AM    THERAPEUTIC PROPHYLACTIC/DX INJECTION SUBQ/IM    Reviewed

 

                    2017 12:00 AM    B12 1000mcg Injection, [facility] Medicare    Reviewed

 

                    5/3/2012 12:00 AM    THER/PROPH/DIAG INJ SC/IM    Reviewed

 

                    5/3/2012 12:00 AM    B12 Injection([place]) Ndc#8009-6267-31-    Reviewed

 

                    2017 12:00 AM    THERAPEUTIC PROPHYLACTIC/DX INJECTION SUBQ/IM    Reviewed

 

                    2017 12:00 AM    B12 1000mcg Injection, [facility] Medicare    Reviewed

 

                    2017 12:00 AM    MRI BRAIN STEM W/O & W/DYE    Reviewed

 

                    2017 12:00 AM    VITAMIN B-12        Reviewed

 

                    2017 12:00 AM    Speech Therapy Consult    Reviewed

 

                    2017 12:00 AM    ASSAY OF CREATININE    Reviewed

 

                    2012 12:00 AM    IMMUNOTHERAPY INJECTIONS    Reviewed

 

                    2012 12:00 AM    B12 Injection([place]) Ndc#1327-3560-65-    Reviewed

 

                    2017 12:00 AM    URINALYSIS AUTO W/SCOPE    Reviewed

 

                    2012 12:00 AM    THER/PROPH/DIAG INJ SC/IM    Reviewed

 

                    2012 12:00 AM    B12 Injection, Up to 1000 Mcg NDC#6303-4741-51    Reviewed

 

                    2012 12:00 AM    THER/PROPH/DIAG INJ SC/IM    Reviewed

 

                    2012 12:00 AM    B12 Injection, Up to 1000 Mcg NDC#4750-8603-47    Reviewed

 

                    2012 12:00 AM    THER/PROPH/DIAG INJ SC/IM    Reviewed

 

                    2012 12:00 AM    B12 Injection, Up to 1000 Mcg NDC#1777-9018-68    Reviewed

 

                    10/16/2012 12:00 AM    THER/PROPH/DIAG INJ SC/IM    Reviewed

 

                    10/16/2012 12:00 AM    B12 Injection, Up to 1000 Mcg NDC#9617-7395-65    Reviewed

 

                    2010 12:00 AM    COMPREHEN METABOLIC PANEL    Reviewed

 

                    2010 12:00 AM    COMPLETE CBC W/AUTO DIFF WBC    Reviewed

 

                    2010 12:00 AM    LIPID PANEL         Reviewed

 

                    2013 12:00 AM    Flu Injection 3 Years And Above NDC# [address]-60  [facility]    Reviewed



 

                    2013 12:00 AM    COMPLETE CBC W/AUTO DIFF WBC    Reviewed

 

                    2013 12:00 AM    ASSAY OF LITHIUM    Reviewed

 

                    2013 12:00 AM    METABOLIC PANEL TOTAL CA    Reviewed

 

                    4/3/2013 12:00 AM    THER/PROPH/DIAG INJ SC/IM    Reviewed

 

                    4/3/2013 12:00 AM    B12 Injection, Up to 1000 Mcg NDC#1787-1707-70    Reviewed

 

                    2013 12:00 AM    THER/PROPH/DIAG INJ SC/IM    Reviewed

 

                    2013 12:00 AM    B12 Injection, Up to 1000 Mcg NDC#6500-1709-62    Reviewed

 

                    2013 12:00 AM    THER/PROPH/DIAG INJ SC/IM    Reviewed

 

                    2013 12:00 AM    B12 Injection, Up to 1000 Mcg NDC#9323-8226-49    Reviewed

 

                    2013 12:00 AM    LIPID PANEL         Reviewed

 

                    2013 12:00 AM    VITAMIN D 25 HYDROXY    Reviewed

 

                    2013 12:00 AM    THER/PROPH/DIAG INJ SC/IM    Reviewed

 

                    2013 12:00 AM    B12 Injection, Up to 1000 Mcg NDC#8300-2393-66    Reviewed

 

                    2013 12:00 AM    THER/PROPH/DIAG INJ SC/IM    Reviewed

 

                    3/6/2014 12:00 AM    THER/PROPH/DIAG INJ SC/IM    Reviewed

 

                    2014 12:00 AM    THER/PROPH/DIAG INJ SC/IM    Reviewed

 

                    2014 12:00 AM    B12 Injection, Up to 1000 Mcg NDC#6126-8117-31    Reviewed

 

                    2010 12:00 AM    SKIN FUNGI CULTURE    Reviewed

 

                    10/9/2010 12:00 AM    COMPREHEN METABOLIC PANEL    Reviewed

 

                    10/9/2010 12:00 AM    LIPID PANEL         Reviewed

 

                    2010 12:00 AM    THER/PROPH/DIAG INJ SC/IM    Reviewed

 

                    2010 12:00 AM    B12 Injection Ndc#[address]-79 ([facility])    Reviewed

 

                    2010 12:00 AM    THER/PROPH/DIAG INJ SC/IM    Reviewed

 

                    2010 12:00 AM    Kenalog 40 Mg Im-Ndc#[address]-75 ([facility])    Reviewed

 

                    10/15/2010 12:00 AM    FLU VACCINE 3 YRS & > IM    Reviewed

 

                    10/15/2010 12:00 AM    Admin.Of M/C Cov.Vaccine-Flu Vacc.    Reviewed

 

                    1/15/2011 12:00 AM    COMPLETE CBC W/AUTO DIFF WBC    Reviewed

 

                    1/15/2011 12:00 AM    COMPREHEN METABOLIC PANEL    Reviewed

 

                    1/15/2011 12:00 AM    LIPID PANEL         Reviewed

 

                    2014 12:00 AM    MAMMOGRAM SCREENING    Reviewed

 

                    2014 12:00 AM    Screening mammography, bilateral    Reviewed

 

                    7/10/2014 12:00 AM    THER/PROPH/DIAG INJ SC/IM    Reviewed

 

                    7/10/2014 12:00 AM    B12 Injection, Up to 1000 Mcg NDC#2172-3897-52    Reviewed

 

                    2011 12:00 AM    COMPLETE CBC W/AUTO DIFF WBC    Reviewed

 

                    2011 12:00 AM    COMPREHEN METABOLIC PANEL    Reviewed

 

                    2011 12:00 AM    LIPID PANEL         Reviewed

 

                    2014 12:00 AM    B12 Injection, Up to 1000 Mcg NDC#1451-3615-80    Reviewed

 

                    10/19/2014 12:00 AM    MAMMOGRAM SCREENING    Reviewed

 

                    10/19/2014 12:00 AM    Screening mammography, bilateral    Reviewed

 

                    10/16/2014 12:00 AM    B12 Injection, Up to 1000 Mcg NDC#4548-2808-68    Reviewed

 

                    10/16/2014 12:00 AM    COMPLETE CBC W/AUTO DIFF WBC    Reviewed

 

                    10/16/2014 12:00 AM    COMPREHEN METABOLIC PANEL    Reviewed

 

                    10/16/2014 12:00 AM    IMMUNOASSAY TUMOR     Reviewed

 

                    10/16/2014 12:00 AM    LIPID PANEL         Reviewed

 

                    10/16/2014 12:00 AM    ASSAY OF LITHIUM    Reviewed

 

                    10/16/2014 12:00 AM    MAMMOGRAM SCREENING    Reviewed

 

                    2011 12:00 AM    ASSAY OF PARATHORMONE    Reviewed

 

                    2011 12:00 AM    VITAMIN D 25 HYDROXY    Reviewed

 

                    2011 12:00 AM    ASSAY OF LITHIUM    Reviewed

 

                    2011 12:00 AM    METABOLIC PANEL TOTAL CA    Reviewed

 

                    2011 12:00 AM    CT HEAD/BRAIN W/O & W/DYE    Reviewed

 

                    3/23/2015 12:00 AM    PNEUMOCOCCAL VACC 13 GLENDY IM    Reviewed

 

                    3/23/2015 12:00 AM    Vitamin B12 injection    Reviewed

 

                    2011 12:00 AM    ASSAY OF LITHIUM    Reviewed

 

                    2011 12:00 AM    B12 Injection Ndc#[address]-71  [facility]    Reviewed

 

                    2015 12:00 AM    THER/PROPH/DIAG INJ SC/IM    Reviewed

 

                    2015 12:00 AM    B12 Injection, Up to 1000 Mcg NDC#1566-0615-53    Reviewed

 

                    2015 12:00 AM    COMPLETE CBC W/AUTO DIFF WBC    Reviewed

 

                    2015 12:00 AM    COMPREHEN METABOLIC PANEL    Reviewed

 

                    2015 12:00 AM    LIPID PANEL         Reviewed

 

                    2015 12:00 AM    ASSAY OF LITHIUM    Reviewed

 

                    2011 12:00 AM    VIT D 1 25-DIHYDROXY    Reviewed

 

                    2011 12:00 AM    VITAMIN B-12        Reviewed

 

                    2015 12:00 AM    B12 Injection, Up to 1000 Mcg NDC#6540-9772-92    Reviewed

 

                    2015 12:00 AM    THER/PROPH/DIAG INJ SC/IM    Reviewed

 

                    2015 12:00 AM    B12 Injection, Up to 1000 Mcg NDC#1345-4284-16    Reviewed

 

                    2011 12:00 AM    THER/PROPH/DIAG INJ SC/IM    Reviewed

 

                    2011 12:00 AM    B12 Injection ([employer]) Ndc#8234-4303-79    Reviewed

 

                    2015 12:00 AM    THER/PROPH/DIAG INJ SC/IM    Reviewed

 

                    2015 12:00 AM    B12 Injection, Up to 1000 Mcg NDC#7789-9181-08    Reviewed







Results Summary







                          Data and Description      Results

 

                          2004 12:00 AM        Colonoscopy-Women and Men over 50 Normal 

 

                          2008 12:00 AM         Pap Smear Declined 

 

                          10/7/2009 12:00 AM        Cholest Cry Stone Ql .0 %LDLc SerPl-mCnc 123.0 mg/dLHDLc

 SerPl-mCnc 34.0 mg/dLTrigl SerPl-mCnc 190.0 mg/dLGlucose SerPl-mCnc 78.0 mg/dL

 

                          2009 12:00 AM        Mammogram -Women over 40 Normal HIV1+2 Ab Ser Ql no risk 

 

                          2010 8:47 AM         Dexa Bone Scan Refused Aspirin reccommended Contraindication 



 

                          2010 8:48 AM         Depression Done 

 

                          2010 12:00 AM         Foot Exam-Diabetic Done 

 

                          2010 12:00 AM         Cholest Cry Stone Ql .0 %LDLc SerPl-mCnc 126.0 mg/dLGlucose

 SerPl-mCnc 102.0 mg/dL

 

                          2010 8:45 AM          TRIGLYCERIDES 122.0 mg/dLCHOLESTEROL 186.0 mg/dLHDL 36.0 mg/dLTOT

 CHOL/HDL 5.2 LDL (CALC) 126.0 mg/dLGLUCOSE 102.0 mg/dLSODIUM 143.0 
mmol/LPOTASSIUM 3.70 mmol/LCHLORIDE 111.0 mmol/LCO2 23.0 mmol/LBUN 10.0 
mg/dLCREATININE 0.80 mg/dLSGOT/AST 12.0 IU/LSGPT/ALT 11.0 IU/LALK PHOS 65.0 
IU/LTOTAL PROTEIN 7.20 g/dLALBUMIN 3.90 g/dLTOTAL BILI 0.50 mg/dLCALCIUM 10.20 
mg/dLAGE 59 GFR NonAA 73 GFR AA 88 eGFR >60 mL/min/1.73 m2eGFR AA* >60 WBC 5.7 
RBC 3.26 HGB 10.60 g/dLHCT 31.70 %MCV 97.0 fLMCH 32.50 pgMCHC 33.40 g/dLRDW SD 
47 RDW CV 13.30 %MPV 9.70 fLPLT 287 NRBC# 0.00 NRBC% 0.0 %NEUT 62.90 %%LYMP 
21.80 %%MONO 9.90 %%EOS 5.0 %%BASO 0.40 %#NEUT 3.56 #LYMP 1.23 #MONO 0.56 #EOS 
0.28 #BASO 0.02 MANUAL DIFF NOT IND 

 

                          2010 12:00 AM        Glucose SerPl-mCnc 96.0 mg/dLCholest Cry Stone Ql .0 %LDLc

 SerPl-mCnc 146.0 mg/dL

 

                          2010 8:26 AM         TRIGLYCERIDES 106.0 mg/dLCHOLESTEROL 199.0 mg/dLHDL 32.0 mg/dLTOT

 CHOL/HDL 6.2 LDL (CALC) 146.0 mg/dLGLUCOSE 96.0 mg/dLSODIUM 143.0 
mmol/LPOTASSIUM 4.0 mmol/LCHLORIDE 113.0 mmol/LCO2 24.0 mmol/LBUN 13.0 
mg/dLCREATININE 1.0 mg/dLSGOT/AST 11.0 IU/LSGPT/ALT 6.0 IU/LALK PHOS 56.0 
IU/LTOTAL PROTEIN 6.60 g/dLALBUMIN 3.80 g/dLTOTAL BILI 0.50 mg/dLCALCIUM 9.30 
mg/dLAGE 59 GFR NonAA 57 GFR AA 69 eGFR 57 eGFR AA* >60 

 

                          10/6/2010 12:00 AM        Cholest Cry Stone Ql .0 %LDLc SerPl-mCnc 111.0 mg/dLGlucose

 SerPl-mCnc 81.0 mg/dL

 

                          10/6/2010 2:45 PM         TRIGLYCERIDES 123.0 mg/dLCHOLESTEROL 178.0 mg/dLHDL 42.0 mg/dLTOT

 CHOL/HDL 4.2 LDL (CALC) 111.0 mg/dLGLUCOSE 81.0 mg/dLSODIUM 139.0 
mmol/LPOTASSIUM 4.10 mmol/LCHLORIDE 106.0 mmol/LCO2 24.0 mmol/LBUN 13.0 
mg/dLCREATININE 0.90 mg/dLSGOT/AST 13.0 IU/LSGPT/ALT 11.0 IU/LALK PHOS 61.0 
IU/LTOTAL PROTEIN 7.10 g/dLALBUMIN 3.90 g/dLTOTAL BILI 0.30 mg/dLCALCIUM 9.30 
mg/dLAGE 60 GFR NonAA 64 GFR AA 78 eGFR >60 mL/min/1.73 m2eGFR AA* >60 WBC 6.9 
RBC 3.59 HGB 11.50 g/dLHCT 35.30 %MCV 98.0 fLMCH 32.0 pgMCHC 32.60 g/dLRDW SD 46
 RDW CV 12.90 %MPV 9.90 fLPLT 311 NRBC# 0.00 NRBC% 0.0 %NEUT 64.90 %%LYMP 22.50 
%%MONO 7.20 %%EOS 5.10 %%BASO 0.30 %#NEUT 4.45 #LYMP 1.54 #MONO 0.49 #EOS 0.35 
#BASO 0.02 MANUAL DIFF NOT IND 

 

                          2011 12:00 AM         Mammogram -Women over 40 Ordered 

 

                          2011 10:25 AM        TRIGLYCERIDES 111.0 mg/dLCHOLESTEROL 195.0 mg/dLHDL 43.0 mg/dLTOT

 CHOL/HDL 4.5 LDL (CALC) 130.0 mg/dLWBC 5.3 RBC 3.76 HGB 12.0 g/dLHCT 37.80 %MCV
 101.0 fLMCH 31.90 pgMCHC 31.70 g/dLRDW SD 47 RDW CV 13.0 %MPV 9.70 fLPLT 259 
NRBC# 0.00 NRBC% 0.0 %NEUT 69.0 %%LYMP 17.60 %%MONO 8.30 %%EOS 4.70 %%BASO 0.40 
%#NEUT 3.63 #LYMP 0.93 #MONO 0.44 #EOS 0.25 #BASO 0.02 MANUAL DIFF NOT IND 
GLUCOSE 102.0 mg/dLSODIUM 146.0 mmol/LPOTASSIUM 4.20 mmol/LCHLORIDE 113.0 
mmol/LCO2 23.0 mmol/LBUN 15.0 mg/dLCREATININE 1.0 mg/dLSGOT/AST 12.0 
IU/LSGPT/ALT 17.0 IU/LALK PHOS 60.0 IU/LTOTAL PROTEIN 6.90 g/dLALBUMIN 4.20 
g/dLTOTAL BILI 0.40 mg/dLCALCIUM 9.70 mg/dLAGE 60 GFR NonAA 57 GFR AA 69 eGFR 57
 eGFR AA* >60 

 

                          2011 11:49 AM        Cholest Cry Stone Ql .0 %LDLc SerPl-mCnc 130.0 mg/dLHDLc

 SerPl-mCnc 43.0 mg/dLTrigl SerPl-mCnc 111.0 mg/dLGlucose SerPl-mCnc 102.0 mg/dL

 

                          2011 11:52 AM        Pap Smear Declined 

 

                          2011 11:28 AM        Lithium 2.080 mmol/LGLUCOSE 102.0 mg/dLSODIUM 135.0 mmol/LPOTASSIUM

 3.90 mmol/LCHLORIDE 106.0 mmol/LCO2 21.0 mmol/LBUN 12.0 mg/dLCREATININE 1.30 
mg/dLCALCIUM 10.70 mg/dLAGE 60 GFR NonAA 42 GFR AA 51 eGFR 42 eGFR AA* 51 

 

                          2011 8:58 AM          Lithium 0.690 mmol/L

 

                          2011 2:38 PM         VITAMIN B12 3483.0 pg/mL

 

                          2013 3:35 PM          WBC 5.1 RBC 3.73 HGB 11.70 g/dLHCT 36.40 %MCV 98.0 fLMCH 31.40

 pgMCHC 32.10 g/dLRDW SD 47 RDW CV 13.10 %MPV 9.80 fLPLT 224 NRBC# 0.00 NRBC% 
0.0 %NEUT 66.80 %%LYMP 19.10 %%MONO 9.0 %%EOS 4.90 %%BASO 0.20 %#NEUT 3.42 #LYMP
 0.98 #MONO 0.46 #EOS 0.25 #BASO 0.01 MANUAL DIFF NOT IND GLUCOSE 88.0 
mg/dLSODIUM 141.0 mmol/LPOTASSIUM 4.10 mmol/LCHLORIDE 110.0 mmol/LCO2 22.0 
mmol/LBUN 22.0 mg/dLCREATININE 1.10 mg/dLCALCIUM 9.80 mg/dLAGE 62 GFR NonAA 50 
GFR AA 61 eGFR 50 eGFR AA* 60 Lithium 0.760 mmol/L

 

                          2013 11:02 AM        TRIGLYCERIDES 106.0 mg/dLCHOLESTEROL 181.0 mg/dLHDL 46.0 mg/dLTOT

 CHOL/HDL 3.9 LDL (CALC) 114.0 mg/dLVITAMIN D 41.10 ng/mL

 

                          10/17/2014 10:10 AM       WBC 5.0 RBC 3.66 HGB 11.60 g/dLHCT 36.80 %.0 fLMCH 31.70

 pgMCHC 31.50 g/dLRDW SD 50 RDW CV 13.50 %MPV 10.10 fLPLT 209 NRBC# 0.00 NRBC% 
0.0 %NEUT 69.20 %%LYMP 21.0 %%MONO 6.40 %%EOS 3.20 %%BASO 0.20 %#NEUT 3.46 #LYMP
 1.05 #MONO 0.32 #EOS 0.16 #BASO 0.01 MANUAL DIFF NOT IND GLUCOSE 100.0 
mg/dLSODIUM 148.0 mmol/LPOTASSIUM 3.90 mmol/LCHLORIDE 114.0 mmol/LCO2 26.0 
mmol/LBUN 12.0 mg/dLCREATININE 1.20 mg/dLSGOT/AST 9.0 IU/LSGPT/ALT <6 IU/LALK 
PHOS 82.0 IU/LTOTAL PROTEIN 6.90 g/dLALBUMIN 4.0 g/dLTOTAL BILI 0.40 
mg/dLCALCIUM 10.50 mg/dLAGE 64 GFR NonAA 45 GFR AA 55 eGFR 45 eGFR AA* 55 
TRIGLYCERIDES 96.0 mg/dLCHOLESTEROL 155.0 mg/dLHDL 38.0 mg/dLTOT CHOL/HDL 4.1 
LDL (CALC) 98.0 mg/dLLithium 0.850 mmol/LCancer Antigen (CA) 125 8.30 U/mL

 

                          2015 10:25 AM        Lithium 0.790 mmol/LWBC 4.8 RBC 3.44 HGB 11.0 g/dLHCT 35.20 

%.0 fLMCH 32.0 pgMCHC 31.30 g/dLRDW SD 53 RDW CV 14.0 %MPV 9.30 fLPLT 210
 NRBC# 0.00 NRBC% 0.0 %NEUT 70.80 %%LYMP 17.20 %%MONO 8.10 %%EOS 3.50 %%BASO 
0.40 %#NEUT 3.41 #LYMP 0.83 #MONO 0.39 #EOS 0.17 #BASO 0.02 MANUAL DIFF NOT IND 
TRIGLYCERIDES 107.0 mg/dLCHOLESTEROL 174.0 mg/dLHDL 43.0 mg/dLTOT CHOL/HDL 4.0 
LDL (CALC) 110.0 mg/dLGLUCOSE 90.0 mg/dLSODIUM 145.0 mmol/LPOTASSIUM 3.80 
mmol/LCHLORIDE 115.0 mmol/LCO2 24.0 mmol/LBUN 17.0 mg/dLCREATININE 1.30 
mg/dLSGOT/AST 18.0 IU/LSGPT/ALT 17.0 IU/LALK PHOS 56.0 IU/LTOTAL PROTEIN 6.70 
g/dLALBUMIN 3.90 g/dLTOTAL BILI 0.40 mg/dLCALCIUM 9.80 mg/dLAGE 64 GFR NonAA 41 
GFR AA 50 eGFR 41 eGFR AA* 50 

 

                          2015 8:50 AM        WBC 5.8 RBC 3.29 HGB 10.70 g/dLHCT 34.0 %.0 fLMCH 32.50

 pgMCHC 31.50 g/dLRDW SD 52 RDW CV 13.60 %MPV 9.60 fLPLT 223 NRBC# 0.00 NRBC% 
0.0 %NEUT 69.60 %%LYMP 18.90 %%MONO 8.50 %%EOS 2.80 %%BASO 0.20 %#NEUT 4.03 
#LYMP 1.09 #MONO 0.49 #EOS 0.16 #BASO 0.01 MANUAL DIFF NOT IND Lithium 0.620 
mmol/LGLUCOSE 83.0 mg/dLSODIUM 139.0 mmol/LPOTASSIUM 3.90 mmol/LCHLORIDE 109.0 
mmol/LCO2 22.0 mmol/LBUN 19.0 mg/dLCREATININE 1.40 mg/dLSGOT/AST 19.0 
IU/LSGPT/ALT 21.0 IU/LALK PHOS 55.0 IU/LTOTAL PROTEIN 6.50 g/dLALBUMIN 3.90 
g/dLTOTAL BILI 0.50 mg/dLCALCIUM 9.60 mg/dLAGE 65 GFR NonAA 38 GFR AA 46 eGFR 38
 eGFR AA* 46 TRIGLYCERIDES 121.0 mg/dLCHOLESTEROL 192.0 mg/dLHDL 51.0 mg/dLTOT 
CHOL/HDL 3.8 .0 mg/dLFREE T4 0.79 TSH 1.210 uIU/mLHemoglobin A1c 5.40 
%Estim. Avg Glu (eAG) 108 

 

                          3/15/2016 8:08 AM         VITAMIN B12 696.0 pg/mL

 

                          3/23/2016 8:26 AM         WBC 7.0 RBC 3.61 HGB 11.80 g/dLHCT 37.70 %.0 fLMCH 32.70

 pgMCHC 31.30 g/dLRDW SD 49 RDW CV 12.50 %MPV 10.0 fLPLT 207 NRBC# 0.00 NRBC% 
0.0 %NEUT 73.60 %%LYMP 16.40 %%MONO 6.60 %%EOS 3.0 %%BASO 0.30 %#NEUT 5.15 #LYMP
 1.15 #MONO 0.46 #EOS 0.21 #BASO 0.02 MANUAL DIFF NOT IND Lithium 0.940 
mmol/LGLUCOSE 108.0 mg/dLSODIUM 143.0 mmol/LPOTASSIUM 4.30 mmol/LCHLORIDE 110.0 
mmol/LCO2 27.0 mmol/LBUN 16.0 mg/dLCREATININE 1.60 mg/dLSGOT/AST 13.0 
IU/LSGPT/ALT 7.0 IU/LALK PHOS 71.0 IU/LTOTAL PROTEIN 6.80 g/dLALBUMIN 4.0 
g/dLTOTAL BILI 0.20 mg/dLCALCIUM 10.40 mg/dLAGE 65 GFR NonAA 32 GFR AA 39 eGFR 
32 eGFR AA* 39 TRIGLYCERIDES 113.0 mg/dLCHOLESTEROL 169.0 mg/dLHDL 42.0 mg/dLTOT
 CHOL/HDL 4.0 LDL (CALC) 104.0 mg/dLFREE T4 0.86 TSH 2.20 uIU/mLHemoglobin A1c 
5.20 %Estim. Avg Glu (eAG) 103 

 

                          3/25/2016 9:17 AM         COLOR YELLOW APPEARANCE CLEAR SPEC GRAV 1.010 pH 7.0 PROTEIN 

NEGATIVE GLUCOSE NEGATIVE mg/dLKETONE NEGATIVE BILIRUBIN NEGATIVE BLOOD NEGATIVE
 NITRITE NEGATIVE LEUK SCREEN SMALL MICRO IND? SEE BELOW WBC/HPF 0-5 RBC/HPF 
NEGATIVE CASTS/LPF NEGATIVE /LPFCRYSTALS NEGATIVE MUCOUS THRDS NEGATIVE BACTERIA
 NEGATIVE EPITH CELLS FEW SQUAMOUS /HPFTRICHOMONAS NEGATIVE YEAST NEGATIVE 

 

                          2016 6:00 AM        GLUCOSE 91.0 mg/dLSODIUM 143.0 mmol/LPOTASSIUM 3.60 mmol/LCHLORIDE

 112.0 mmol/LCO2 23.0 mmol/LBUN 22.0 mg/dLCREATININE 1.20 mg/dLSGOT/AST 15.0 
IU/LSGPT/ALT 12.0 IU/LALK PHOS 61.0 IU/LTOTAL PROTEIN 5.40 g/dLALBUMIN 3.10 
g/dLTOTAL BILI 0.40 mg/dLCALCIUM 8.40 mg/dLAGE 66 GFR NonAA 45 GFR AA 55 eGFR 45
 eGFR AA* 55 WBC 3.0 RBC 3.05 HGB 9.80 g/dLHCT 32.10 %.0 fLMCH 32.10 
pgMCHC 30.50 g/dLRDW SD 54 RDW CV 14.20 %MPV 10.10 fLPLT 170 NRBC# 0.00 NRBC% 
0.0 %NEUT 50.70 %%LYMP 32.60 %%MONO 10.50 %%EOS 5.90 %%BASO 0.30 %#NEUT 1.54 
#LYMP 0.99 #MONO 0.32 #EOS 0.18 #BASO 0.01 MANUAL DIFF NOT IND 

 

                          2016 2:09 PM        COLOR YELLOW APPEARANCE CLEAR SPEC GRAV 1.010 pH 5.0 PROTEIN

 30 GLUCOSE NEGATIVE mg/dLKETONE NEGATIVE BILIRUBIN NEGATIVE BLOOD LARGE NITRITE
 NEGATIVE LEUK SCREEN MODERATE MICRO INDICATED? SEE BELOW WBC/HPF  RBC/HPF
 20-50 CASTS/LPF NEGATIVE /LPFCRYSTALS NEGATIVE MUCOUS THRDS NEGATIVE BACTERIA 
NEGATIVE EPITH CELLS FEW SQUAMOUS /HPFTRICHOMONAS NEGATIVE YEAST NEGATIVE CULT 
SET UP? YES 

 

                          1/3/2017 4:08 PM          COLOR YELLOW APPEARANCE HAZY SPEC GRAV 1.015 pH 6.0 PROTEIN 30

 GLUCOSE NEGATIVE mg/dLKETONE NEGATIVE BILIRUBIN NEGATIVE BLOOD MODERATE NITRITE
 NEGATIVE LEUK SCREEN LARGE MICRO INDICATED? SEE BELOW WBC/-200 RBC/HPF 
5-10 CASTS/LPF NEGATIVE /LPFCRYSTALS NEGATIVE MUCOUS THRDS NEGATIVE BACTERIA 
NEGATIVE EPITH CELLS 1+ SQUAMOUS /HPFTRICHOMONAS NEGATIVE YEAST NEGATIVE CULT 
SET UP? YES 

 

                          2017 4:24 PM         CREATININE 1.50 mg/dLAGE 66 GFR NonAA 35 GFR AA 42 eGFR 35 eGFR

 AA* 42 VITAMIN B12 1324.0 pg/mL

 

                          2017 11:30 AM         GLUCOSE 93.0 mg/dLSODIUM 143.0 mmol/LPOTASSIUM 3.10 mmol/LCHLORIDE

 101.0 mmol/LCO2 29.0 mmol/LBUN 26.0 mg/dLCREATININE 1.50 mg/dLSGOT/AST 23.0 
IU/LSGPT/ALT 13.0 IU/LALK PHOS 66.0 IU/LTOTAL PROTEIN 7.70 g/dLALBUMIN 4.30 
g/dLTOTAL BILI 0.40 mg/dLCALCIUM 10.30 mg/dLAGE 66 GFR NonAA 35 GFR AA 42 eGFR 
35 eGFR AA* 42 TRIGLYCERIDES 147.0 mg/dLCHOLESTEROL 184.0 mg/dLHDL 44.0 mg/dLTOT
 CHOL/HDL 4.2 .0 mg/dLWBC 5.4 RBC 3.98 HGB 12.90 g/dLHCT 40.20 %.0
 fLMCH 32.40 pgMCHC 32.10 g/dLRDW SD 50 RDW CV 13.50 %MPV 9.30 fLPLT 210 NRBC# 
0.00 NRBC% 0.0 %NEUT 54.20 %%LYMP 30.70 %%MONO 9.10 %%EOS 5.20 %%BASO 0.40 
%#NEUT 2.94 #LYMP 1.66 #MONO 0.49 #EOS 0.28 #BASO 0.02 MANUAL DIFF NOT IND FREE 
T4 1.09 COLOR YELLOW APPEARANCE CLEAR SPEC GRAV <=1.005 pH 5.5 PROTEIN NEGATIVE 
GLUCOSE NEGATIVE mg/dLKETONE NEGATIVE BILIRUBIN NEGATIVE BLOOD NEGATIVE NITRITE 
NEGATIVE LEUK SCREEN LARGE MICRO INDICATED? SEE BELOW WBC/HPF 10-20 RBC/HPF 0-5 
CASTS/LPF NEGATIVE /LPFCRYSTALS NEGATIVE MUCOUS THRDS NEGATIVE BACTERIA FEW 
EPITH CELLS 1+ SQUAMOUS /HPFTRICHOMONAS NEGATIVE YEAST NEGATIVE CULT SET UP? YES
 TSH 1.820 uIU/mL

 

                          2017 2:45 PM         COLOR YELLOW APPEARANCE CLEAR SPEC GRAV <=1.005 pH 6.0 PROTEIN

 NEGATIVE GLUCOSE NEGATIVE mg/dLKETONE NEGATIVE BILIRUBIN NEGATIVE BLOOD TRACE-
INTACT NITRITE NEGATIVE LEUK SCREEN SMALL MICRO INDICATED? SEE BELOW WBC/HPF 0-5
 RBC/HPF NEGATIVE CASTS/LPF NEGATIVE /LPFCRYSTALS NEGATIVE MUCOUS THRDS NEGATIVE
 BACTERIA FEW EPITH CELLS FEW SQUAMOUS /HPFTRICHOMONAS NEGATIVE YEAST NEGATIVE 
CULT SET UP? YES 







History Of Immunizations







       Name    Date Admin    Mfg Name    Mfg Code    Trade Name    Lot#    Route    Inj    Vis Given    Vis

 Pub                                    CVX

 

        Influenza    2008    Not Entered    NE      Not Entered            Not Entered    Not Entered

                    1            999

 

        X       12/19/2008    [employer]    MSD     Pneumovax 23            Intramuscular    Not Entered

                    1            999

 

           Influenza    10/15/2010    [employer]    NOV        Fluvirin > 12 Years    

[id number]     Intramuscular    Left Deltoid    10/15/2010    2009    999

 

          X         3/23/2015    [facility]    [facility]       Prevnar 13    [id number]    Intramuscular

                Right Gluteous Medius    3/23/2015       2013       109







History of Past Illness







                    Name                Date of Onset       Comments

 

                    Peritoneal Neoplasm, Malignant                         

 

                    Hyperlipidemia                           

 

                    Bipolar disorder, unspecified                         

 

                    Artificial opening status; colostomy                         

 

                    B12 deficiency                           

 

                    Anemia, Pernicious                         

 

                    Arthritis unspecified                         

 

                    cervical cancer                          

 

                    Artificial opening status; colostomy    2010  1:10PM     

 

                    Bipolar disorder, unspecified    2010  1:10PM     

 

                    Hyperlipidemia      2010  1:10PM     

 

                    Anemia, Pernicious    2010  1:10PM     

 

                    Postoperative Follow-Up    2010  1:55PM     

 

                    Postoperative Follow-Up    Mar  8 2010 10:57AM     

 

                    Artificial opening status; colostomy    Mar  8 2010  1:19PM     

 

                    Peritoneal Neoplasm, Malignant    Mar  8 2010  1:19PM     

 

                    Artificial opening status; colostomy    2010  1:40PM     

 

                    Hyperlipidemia      2010  1:40PM     

 

                    Anemia, Pernicious    2010  1:40PM     

 

                    Peritoneal Neoplasm, Malignant    2010  1:40PM     

 

                    Arthritis unspecified    2010  1:40PM     

 

                    Anemia of Chronic Illness    2010  1:40PM     

 

                    Tinea corporis      2010  3:17PM     

 

                    Bipolar disorder, unspecified    2010  1:33PM     

 

                    Hyperlipidemia      2010  1:33PM     

 

                    Anemia, Pernicious    2010  1:33PM     

 

                    Peritoneal Neoplasm, Malignant    2010  1:33PM     

 

                    B12 deficiency      2010  1:33PM     

 

                    Ethmoidal Sinusitis, Acute    Sep 21 2010  3:53PM     

 

                    Wheezing            Sep 21 2010  3:53PM     

 

                    Flu                 Oct 15 2010  1:40PM     

 

                    Bipolar disorder, unspecified    Oct 15 2010  1:42PM     

 

                    Hyperlipidemia      Oct 15 2010  1:42PM     

 

                    Anemia, Pernicious    Oct 15 2010  1:42PM     

 

                    Peritoneal Neoplasm, Malignant    Oct 15 2010  1:42PM     

 

                    Bipolar disorder, unspecified    2011 12:01PM     

 

                    Hyperlipidemia      2011 12:01PM     

 

                    Anemia, Pernicious    2011 12:01PM     

 

                    Peritoneal Neoplasm, Malignant    2011 12:01PM     

 

                    Bipolar disorder, unspecified    Apr 15 2011 10:55AM     

 

                    Major Depression    2011 10:11AM     

 

                    Bipolar Disorder    2011 10:11AM     

 

                    Cancer              May 10 2011  4:16PM     

 

                    Major Depression    May 10 2011  3:16PM     

 

                    Bipolar Disorder    May 10 2011  3:16PM     

 

                    Hypercalcemia       May 23 2011  2:47PM     

 

                    Bipolar disorder, unspecified    May 23 2011  2:47PM     

 

                    Colon Cancer, Personal History    May 23 2011  2:47PM     

 

                    Bipolar Disorder    May 31 2011  4:39PM     

 

                    Depressive Disorder    2011 10:01AM     

 

                    Vitamin B12 deficiency    2011 10:01AM     

 

                    Vitamin D Deficiency    2011  5:07PM     

 

                    Anemia, Vitamin B12 Deficiency    2011  5:07PM     

 

                    B12 deficiency      2011  3:56PM     

 

                    Routine gynecological examination    Aug  4 2011  9:08AM     

 

                    Screening Examination for Breast Cancer    Aug  4 2011  9:08AM     

 

                    Tinea Corporis      Aug  4 2011  9:08AM     

 

                    Depressive Disorder    Sep 23 2011  8:47AM     

 

                    Contact Dermatitis    Sep 23 2011  8:47AM     

 

                    Anemia, Pernicious    Sep 23 2011  8:47AM     

 

                    B12 deficiency      Sep 23 2011  8:47AM     

 

                    B12 deficiency      Sep 27 2011  2:58PM     

 

                    B12 deficiency      Oct 20 2011  2:34PM     

 

                    Flu                 Dec  9 2011  3:16PM     

 

                    B12 deficiency      Dec  9 2011  3:17PM     

 

                    B12 deficiency      2012  4:52PM     

 

                    B12 deficiency      2012 11:10AM     

 

                    B12 deficiency      2012  3:37PM     

 

                    B12 deficiency      May  3 2012  4:10PM     

 

                    B12 deficiency      2012  2:54PM     

 

                    B12 deficiency      2012 11:23AM     

 

                    B12 deficiency      Aug  9 2012  2:08PM     

 

                    B12 deficiency      Sep  6 2012  4:36PM     

 

                    B12 deficiency      Oct 16 2012 10:23AM     

 

                    Flu                 Feb  2013  3:11PM     

 

                    Bipolar disorder, unspecified    Feb  2013  2:48PM     

 

                    Anemia, Pernicious    Feb  2013  2:48PM     

 

                    B12 deficiency      Feb  2013  2:48PM     

 

                    Extrapyramidal abnormal movement disorder    Feb  4   2:48PM     

 

                    B12 deficiency      Apr  3 2013 12:03PM     

 

                    Bipolar disorder, unspecified    May  7 2013  1:31PM     

 

                    Anemia, Pernicious    May  7 2013  1:31PM     

 

                    B12 deficiency      May  7 2013  1:31PM     

 

                    Extrapyramidal abnormal movement disorder    May  7 2013  1:31PM     

 

                    B12 deficiency      2013  3:42PM     

 

                    B12 deficiency      2013  1:31PM     

 

                    Hyperlipidemia      Aug  7 2013 10:37AM     

 

                    Vitamin D Deficiency    Aug  7 2013 10:37AM     

 

                    Bipolar disorder, unspecified    Aug  7 2013 10:37AM     

 

                    Anemia, Pernicious    Aug  7 2013 10:37AM     

 

                    B12 deficiency      Aug  7 2013 10:37AM     

 

                    B12 deficiency      Sep 25 2013 11:15AM     

 

                    B12 deficiency      Dec 11 2013  3:16PM     

 

                    B12 deficiency      Mar  6 2014  1:48PM     

 

                    B12 deficiency      May 21 2014  3:17PM     

 

                    Screening Examination for Breast Cancer    2014  3:23PM     

 

                    Periumbilical abdominal pain    2014  3:23PM     

 

                    B12 deficiency      Jul 10 2014  2:52PM     

 

                    Anemia, Vitamin B12 Deficiency    Aug 13 2014  4:50PM     

 

                    Bipolar disorder    Oct 16 2014 11:13AM     

 

                    Hyperlipidemia      Oct 16 2014 11:13AM     

 

                    Anemia, Pernicious    Oct 16 2014 11:13AM     

 

                    Peritoneal Neoplasm, Malignant    Oct 16 2014 11:13AM     

 

                    Screening breast examination    Oct 16 2014 11:13AM     

 

                    Weight loss         Oct 16 2014 11:13AM     

 

                    Anemia, Pernicious    Mar 23 2015  2:57PM     

 

                    B12 deficiency      Mar 23 2015  2:57PM     

 

                    Need for Prevnar vaccine    Mar 23 2015  2:57PM     

 

                    Bipolar disorder    Mar 23 2015  2:57PM     

 

                    Hyperlipidemia      Mar 23 2015  2:57PM     

 

                    Anemia, Pernicious    Mar 23 2015  2:57PM     

 

                    Peritoneal Neoplasm, Malignant    Mar 23 2015  2:57PM     

 

                    B12 deficiency      May  4 2015  4:48PM     

 

                    Hyperlipidemia      May 13 2015  9:56AM     

 

                    Anemia              May 13 2015  9:56AM     

 

                    Bipolar disorder    May 13 2015  9:56AM     

 

                    Bipolar disorder    May 14 2015  3:27PM     

 

                    Hyperlipidemia      May 14 2015  3:27PM     

 

                    Anemia, Pernicious    May 14 2015  3:27PM     

 

                    Peritoneal Neoplasm, Malignant    May 14 2015  3:27PM     

 

                    B12 deficiency      2015  2:20PM     

 

                    B12 deficiency      2015 11:34AM     

 

                    B12 deficiency      Aug 18 2015  9:06AM     

 

                    Tinea Corporis      Sep 18 2015  8:54AM     

 

                    B12 deficiency      Sep 18 2015  8:54AM     

 

                    B12 deficiency      2015 10:28AM     

 

                    Herpes zoster without complication    Dec  3 2015  9:52AM     

 

                    B12 deficiency      Dec 23 2015 11:21AM     

 

                    B12 deficiency      2016  4:51PM     

 

                    Vitamin B 12 deficiency    Mar 14 2016  5:35PM     

 

                    B12 deficiency      Mar 15 2016 12:14PM     

 

                    B12 deficiency      May  5 2016 11:30AM     

 

                    Edema               May  5 2016 11:30AM     

 

                    Dermatitis          May  5 2016 11:30AM     

 

                    Edema               May 17 2016  8:38AM     

 

                    Shortness of breath    May 17 2016  8:38AM     

 

                    Bilateral edema of lower extremity    2016  2:06PM     

 

                    B12 deficiency      2016  2:06PM     

 

                    B12 deficiency      2016 11:50AM     

 

                    B12 deficiency      2016 11:20AM     

 

                    Diarrhea            Aug  2 2016  3:13PM     

 

                    B12 deficiency      Aug 24 2016 11:10AM     

 

                    Encounter for screening mammogram for breast cancer    Aug 24 2016 11:44AM     

 

                    B12 deficiency      Sep 28 2016  2:35PM     

 

                    B12 deficiency      Dec 15 2016  2:02PM     

 

                    Dysuria             Dec 29 2016 12:14PM     

 

                    Hematuria           Fidencio  3 2017  1:33PM     

 

                    Constipation by delayed colonic transit    2017  1:52PM     

 

                    Ileus               2017  1:52PM     

 

                    UTI (urinary tract infection)    Fidencio 15 2017  3:39PM     

 

                    Acute cystitis with hematuria    2017 11:07AM     

 

                    B12 deficiency      2017 11:07AM     

 

                    B12 deficiency      2017 11:40AM     

 

                    B12 deficiency      2017  4:07PM     

 

                    Slurred speech      2017  3:07PM     

 

                    Vitamin B12 deficiency    2017  3:07PM     

 

                    Dysphagia, unspecified type    2017  3:07PM     

 

                    Hyperlipidemia      2017  3:07PM     

 

                    Dysuria             2017 12:01PM     

 

                    B12 deficiency      2017  2:08PM     

 

                    Dysuria             2017 10:58AM     







Payers







           Insurance Name    Company Name    Plan Name    Plan Number    Policy Number    Policy Group

 Number                                 Start Date

 

                    Medicare RHC Medicare RHC              [id number]              N/A

 

                          [employer]    [employer]                 [id number]

                                                    

 

                    Medicare Part A    Medicare - Lab/Xray              [id number]              2006

 

                    Medicare Part B    [employer]icare [place]              [id number]              2006

 

                          [employer]    [employer]                 50 percent

                                                    2009

 

                    [facility]              [id number]              N/A

 

                    Medicare Part A    Medicare Part A              [id number]              N/A

 

                    Medicare Part A    Medicare Part A              [id number]              2006









History of Encounters







                    Visit Date          Visit Type          Provider

 

                    2017           Nurse visit         Bhupinder Aspen DO

 

                    2017           Office visit         

 

                    2017           Office visit        Bhupinder Aspen DO

 

                    2017           Nurse visit         Bhupinder Aspen DO

 

                    2017           Office visit        Radha Ontiveros APRN

 

                    1/15/2017           Office visit        Aj Tapia NP

 

                    2017            Office visit        Devin Masterson MD

 

                    2016          [facility]            Devin Masterson MD

 

                    12/15/2016          Nurse visit         Bhupinder Aspen DO

 

                    2016           Nurse visit         Bret Forte APRN

 

                    2016           Nurse visit         Bhupinder Aspen DO

 

                    2016            Office visit        Bhupinder Aspen DO

 

                    2016           Nurse visit         Bhupinder Aspen DO

 

                    2016           Office visit        Bret Forte APRN

 

                    2016            Office visit        Bhupinder Aspen DO

 

                    3/15/2016           Nurse visit         Bhupinder Aspen DO

 

                    2016            Nurse visit         Bhupinder Aspen DO

 

                    2015          Nurse visit         Bhupinder Aspen DO

 

                    12/3/2015           Office visit        Bhupinder Aspen DO

 

                    2015          Nurse visit         Bhupinder Aspen DO

 

                    2015           Office visit        Bhupinder Aspen DO

 

                    2015           Nurse visit         Bhupinder Aspen DO

 

                    2015            Nurse visit         Bhupinder Aspen DO

 

                    2015            Nurse visit         Bhupinder Aspen DO

 

                    2015           Office visit        Bhupinder Aspen DO

 

                    2015            Nurse visit         Bhupinder Aspen DO

 

                    3/23/2015           Office visit        Bhupinder Aspen DO

 

                    10/16/2014          Office visit        Bhupinder Aspen DO

 

                    2014           Nurse visit         Radha Ontiveros APRN

 

                    7/10/2014           Nurse visit         Bhupinder Aspen DO

 

                    2014           Office visit        Bhupinder Aspen DO

 

                    2014           Nurse visit         Bhupinder Aspen DO

 

                    3/6/2014            Nurse visit         Bhupinder Aspen DO

 

                    2014            [facility]            EARNEST Lopez MD

 

                    2013          Nurse visit         Bhupinder Aspen DO

 

                    2013           Nurse visit         Bhupinder Aspen DO

 

                    2013            Office visit        Bhupinder Aspen DO

 

                    2013            Nurse visit         Bhupinder Aspen DO

 

                    2013            Nurse visit         Bhupinder Aspen DO

 

                    2013            Office visit        Bhupinder Aspen DO

 

                    4/3/2013            Nurse visit         Bhupinder Aspen DO

 

                    2013            Office visit        Bhupinder Aspen DO

 

                    10/16/2012          Nurse visit         Bhupinder Aspen DO

 

                    2012            Nurse visit         Bhupinder Aspen DO

 

                    2012            Voided              Bhupinder Aspen DO

 

                    2012            Nurse visit         Bhupinder Aspen DO

 

                    2012            Nurse visit         Bhupinder Aspen DO

 

                    2012           Nurse visit         Bhupinder Aspen DO

 

                    5/3/2012            Nurse visit         Bhupinder Aspen DO

 

                    2012           Nurse visit         Bhupinder Aspen DO

 

                    2012           Nurse visit         Bhupinder Aspen DO

 

                    2012           Nurse visit         Bhupinder Aspen DO

 

                    2011           Nurse visit         Bhupinder Aspen DO

 

                    10/20/2011          Nurse visit         Bhupinder Aspen DO

 

                    2011           Office visit        Bhupinder Aspen DO

 

                    2011           Nurse visit         Radha GREEN

 

                    2011            Office visit        Bhupinder Aspen DO

 

                    2011           Nurse visit         Bhupinder Aspen DO

 

                    2011            Office visit        Bhupinder Aspen DO

 

                    2011           Office visit        Bhupinder Aspen DO

 

                    5/10/2011           Office visit        Bhupinder Aspen DO

 

                    2011           Office visit        Bhupinder Aspen DO

 

                    4/15/2011           Office visit        Devin Angel DO

 

                    2011           Office visit        Devin Angel DO

 

                    10/15/2010          Office visit        Devin Angel DO

 

                    2010           Office visit        Devin Angel DO

 

                    2010            Office visit        Devin Angel DO

 

                    2010           Office visit        Devin Angel DO

 

                    2010            Office visit        Devin Angel DO

 

                    3/8/2010            Office visit        Devin Masterson MD

 

                    2010            Surgery             Devin Masterson MD

 

                    2010            Office visit        Devin Angel DO

 

                    2010           Surgery             Devin Masterson MD

 

                    2010           Hospital            Devin Masterson MD

 

                    2010           [facility]            Devin Masterson MD

 

                    10/22/2009          Office visit        Devin Angel DO

## 2019-06-26 NOTE — XMS REPORT
MU2 Ambulatory Summary

                             Created on: 2016



Pauline Gan

External Reference #: [id number]

: 1950

Sex: Female



Demographics







                          Address                   [address]

[place]  [address]

 

                          Home Phone                [phone]

 

                          Preferred Language        English

 

                          Marital Status            Legally 

 

                          Restoration Affiliation     Unknown

 

                          Race                      White

 

                          Ethnic Group              Not  or 





Author







                          Author                    Bret Forte

 

                          [facility]

 

                          Address                   [address]

[place]  [id number]



 

                          Phone                     [phone]







Care Team Providers







                    Care Team Member Name    Role                Phone

 

                    Bret Forte    PCP                 [phone]







Allergies and Adverse Reactions







                    Name                Reaction            Notes

 

                    NO KNOWN DRUG ALLERGIES                         







Plan of Treatment







             Planned Activity    Comments     Planned Date    Planned Time    Plan/Goal

 

             COMPLETE CBC W/AUTO DIFF WBC                 2013     12:00 AM      

 

             ASSAY OF LITHIUM                 2013     12:00 AM      

 

             METABOLIC PANEL TOTAL CA                 2013     12:00 AM      

 

             VITAMIN B-12                 2013     12:00 AM      

 

             ASSAY OF FOLIC ACID SERUM                 2013     12:00 AM      

 

             THER/PROPH/DIAG INJ SC/IM                 2013    12:00 AM      







Medications







                                        Active 

 

             Name         Start Date    Estimated Completion Date    SIG          Comments

 

                Latuda 20 mg oral tablet                                    take 1 tablet (20 mg) by oral route once daily with

 food (at least 350 calories)            

 

             pravastatin 40 mg oral tablet    3/30/2015                 TAKE 1 TABLET BY MOUTH DAILY     

 

                Namenda XR 28 mg oral capsule,sprinkle,ER 24hr    2015                       take 1 capsule (28

 mg) by oral route once daily            

 

                Namenda XR 28 mg oral capsule,sprinkle,ER 24hr    2016                       take 1 capsule (28

 mg) by oral route once daily            

 

                triamcinolone acetonide 0.1 % topical cream    2016                        apply a thin layer to 

the affected area(s) by topical route 2 times per day     

 

                furosemide 40 mg oral tablet    2016      take 1 tablet (40 mg) by oral

 route once daily                        

 

                potassium chloride 10 mEq oral tablet extended release    2016                       take 1 tablet

 (10 meq) by oral route once daily       

 

             pravastatin 40 mg oral tablet    2016                 TAKE 1 TABLET BY MOUTH DAILY     









                                         

 

             Name         Start Date    Expiration Date    SIG          Comments

 

             Reglan 10mg    3/29/2010    2010    one ac and hs     

 

                Keflex 500 mg oral capsule    2010       10/1/2010       take 1 capsule (500 mg) by oral

 route every 6 hours for 10 days         

 

                Bactrim -160 mg oral tablet    2011       take 1 tablet by oral route

 every 12 hours for 7 days               

 

                triamcinolone acetonide 0.1 % topical cream    2011      apply a thin

 layer to the affected area(s) by topical route 2 times per day     

 

                sertraline 100 mg oral tablet    4/10/2012       5/10/2012       take 1.5 tablets by oral route

 daily for 30 days                       

 

                ergocalciferol (vitamin D2) 50,000 unit oral capsule    4/15/2013       2013       TAKE

 ONE CAPSULE BY MOUTH ONCE A WEEK        

 

                CYANOCOBALAM 1000MCGINJ 1000 milliliter    2013       INJECT 1ML INTRAMUSCULAR

 ONCE A MONTH                            

 

                pravastatin 40 mg oral tablet    3/25/2014       3/20/2015       TAKE ONE TABLET BY MOUTH EVERY

 DAY                                     

 

                          Zostavax (PF) 19,400 unit/0.65 mL subcutaneous suspension for reconstitution    3/23/2015

                    3/24/2015           inject 0.65 milliliter by subcutaneous route once     

 

                famciclovir 500 mg oral tablet    12/3/2015       12/10/2015      take 1 tablet (500 mg) by

 oral route every 8 hours for 7 days     

 

                Cipro 500 mg oral tablet    2016       take 1 tablet (500 mg) by oral route

 2 times per day for 5 days              









                                        Discontinued 

 

             Name         Start Date    Discontinued Date    SIG          Comments

 

                Tylenol 325 mg oral tablet                    2013        take 1 - 2 tablets (325 -650 mg) by oral

 route every 4-6 hours as needed         

 

                Calcium 600 + D(3) 600 mg(1,500mg) -400 unit oral tablet                    2011       take 1 tablet

 by oral route 2 times a day            no longer taking

 

                Vitamin B-12 1,000 mcg oral tablet extended release    2010       take 1

 tablet by oral route daily             no longer taking

 

                Antifungal (clotrimazole) 1 % topical cream    2010       apply to the 

affected and surrounding areas of skin by topical route 2 times per day morning 
and evening                              

 

                sertraline 100 mg oral tablet    5/10/2011       2011       take 2 tablets (200 mg) by 

oral route once daily                   discontinued by Dr. Zach martineztazapine 15 mg oral tablet                    2011        take 1 tablet (15 mg) by oral route 

once daily before bedtime               Dr. Serrano

 

                mirtazapine 15 mg oral tablet                    2011        take 1 tablet (15 mg) by oral route 

once daily before bedtime               dc'd by Dr. Serrano

 

                Pristiq 50 mg oral tablet extended release 24 hr                    2013        take 1 tablet (50

 mg) by oral route once daily           Dr. Serrano

 

                Pristiq 50 mg oral tablet extended release 24 hr                    2013        take 1 tablet (50

 mg) by oral route once daily           dose updated

 

                Vitamin B-12 1,000 mcg/mL injection solution    2011        inject 1 milliliter

 (1,000 mcg) by intramuscular route once a month    on list already

 

                    syringe with needle 1 mL 25 gauge x 1" miscellaneous syringe    2011

                          use for injection once a month     

 

                clotrimazole 1 % topical cream    2011        apply to the affected and surrounding

 areas of skin by topical route 2 times per day in the morning and evening     

 

                Vitamin D2 50,000 unit oral capsule    2011        take 1 capsule (50,000

 unit) by oral route once weekly        generic on list

 

                Pravachol 40 mg oral tablet    2012        take 1 tablet (40 mg) by oral 

route once daily for 90 days            generic on list

 

                lithium carbonate 300 mg oral capsule    2012        take 1 capsule by oral

 route daily                            dose updated

 

                Pristiq 100 mg oral tablet extended release 24 hr                    4/10/2012       take 1 and 1/2 

tablet (150 mg) by oral route once daily    Mental Health provider

 

                Pristiq 100 mg oral tablet extended release 24 hr                    4/10/2012       take 1 and 1/2 

tablet (150 mg) by oral route once daily    Discontinued by Dr Efrain Knight at [facility]

 

                hydroxyzine HCl 50 mg oral tablet    10/16/2014      2015       take 1 tablet (50 mg) 

by oral route at bedtime                 

 

                lithium carbonate 300 mg oral capsule    2015       take 1 capsule (300

 mg) by oral route 2 for 30 days         

 

                fluconazole 100 mg oral tablet    2015       12/3/2015       take 1 tablet (100 mg) by 

oral route once a week                   

 

                ketoconazole 2 % topical cream    2015       12/3/2015       apply to the affected area(s)

 by topical route 2 times per day        

 

                prednisone 10 mg oral tablet    12/3/2015       2016        take 2 tablets (20 mg) by oral

 route once daily for 4 days 1 tablet daily for 4 days 0.5 tablet daily for 4 
days                                     







Problem List







                    Description         Status              Onset

 

                    Artificial opening status; colostomy    Active               

 

                    Bipolar disorder, unspecified    Active               

 

                    Hyperlipidemia      Active               

 

                    Peritoneal Neoplasm, Malignant    Active               

 

                    Anemia, Pernicious    Active               

 

                    Arthritis unspecified    Active               

 

                    B12 deficiency      Active               







Vital Signs







      Date    Time    BP-Sys(mm[Hg]    BP-Lynn(mm[Hg])    HR(bpm)    RR(rpm)    Temp    WT    HT    HC    BMI

                    BSA                 BMI Percentile      O2 Sat(%)

 

        2016    2:04:00 PM    142 mmHg    86 mmHg    68 bpm    16 rpm    98.5 F    166 lbs    63 in

                          29.41 kg/m2    1.83 m2                   100 %

 

        2016    11:27:00 AM    148 mmHg    78 mmHg    90 bpm    20 rpm    98.2 F    153 lbs    69 in

                          22.5939 kg/m    1.8381 m                 96 %

 

        12/3/2015    9:50:00 AM    132 mmHg    70 mmHg    62 bpm    16 rpm    97.9 F    145 lbs    69 in

                          21.41 kg/m2    1.79 m2                   100 %

 

        2015    8:52:00 AM    132 mmHg    68 mmHg    52 bpm    20 rpm    97.8 F    141 lbs    69 in

                          20.8218 kg/m    1.7645 m                 100 %

 

        2015    3:25:00 PM    120 mmHg    62 mmHg    72 bpm    16 rpm    98.1 F    136 lbs    69 in

                          20.08 kg/m2    1.73 m2                   98 %

 

       3/23/2015    2:55:00 PM    130 mmHg    76 mmHg    68 bpm    18 rpm    97 F    140 lbs    69 in    

                20.6742 kg/m    1.7583 m                      98 %

 

        10/16/2014    11:11:00 AM    120 mmHg    66 mmHg    77 bpm    20 rpm    98 F    130 lbs    69 in

                          19.20 kg/m2    1.69 m2                   100 %

 

        2014    3:21:00 PM    130 mmHg    66 mmHg    63 bpm    18 rpm    97.2 F    160 lbs    69 in

                          23.6276 kg/m    1.8797 m                 99 %

 

        2013    10:35:00 AM    132 mmHg    70 mmHg    66 bpm    20 rpm    98.1 F    157 lbs    69 in

                          23.18 kg/m2    1.86 m2                    

 

        2013    1:29:00 PM    132 mmHg    70 mmHg    76 bpm    18 rpm    98.2 F    166 lbs    69 in 

                          24.5137 kg/m    1.9146 m                  

 

       2013    2:46:00 PM    128 mmHg    70 mmHg    76 bpm    16 rpm    98 F    160 lbs    69 in     

                23.63 kg/m2     1.88 m2                          

 

        2011    8:49:00 AM    128 mmHg    78 mmHg    70 bpm    18 rpm    97.9 F    164 lbs    69 in

                          24.2183 kg/m    1.903 m                  

 

     2011    1:31:00 PM    132 mmHg    68 mmHg    84 bpm         97 F    167 lbs                        

                                         

 

        2011    9:09:00 AM    128 mmHg    70 mmHg    72 bpm    18 rpm    98.2 F    163 lbs    64 in 

                          27.9786 kg/m    1.8272 m                  

 

       2011    10:01:00 AM    132 mmHg    70 mmHg    72 bpm    18 rpm    98.2 F    154 lbs             

                                                                 

 

       2011    2:47:00 PM    128 mmHg    70 mmHg    72 bpm    18 rpm    97.8 F    156 lbs             

                                                                 

 

       5/10/2011    3:16:00 PM    144 mmHg    80 mmHg    72 bpm    18 rpm    98.2 F    158 lbs             

                                                                 

 

        2011    10:11:00 AM    132 mmHg    70 mmHg    70 bpm    18 rpm    98.2 F    168 lbs    69 in

                          24.809 kg/m    1.9261 m                  

 

        4/15/2011    10:52:00 AM    110 mmHg    60 mmHg    75 bpm    16 rpm    97.5 F    172.375 lbs    

69 in                     25.46 kg/m2    1.95 m2                   100 %

 

        2011    11:43:00 AM    120 mmHg    82 mmHg    75 bpm    16 rpm    97.2 F    178.5 lbs    69

 in                       26.3596 kg/m    1.9854 m                 100 %

 

        10/15/2010    1:32:00 PM    120 mmHg    70 mmHg    80 bpm    18 rpm    96.6 F    177 lbs    69 in

                          26.14 kg/m2    1.98 m2                   100 %

 

        2010    3:50:00 PM    168 mmHg    100 mmHg    82 bpm    18 rpm    97.8 F    177.5 lbs    69

 in                       26.2119 kg/m    1.9798 m                 97 %

 

        2010    1:21:00 PM    140 mmHg    80 mmHg    59 bpm    16 rpm    97.6 F    173.25 lbs    69 

in                        25.58 kg/m2    1.96 m2                   100 %

 

        2010    3:02:00 PM    140 mmHg    80 mmHg    61 bpm    16 rpm    97.6 F    173.125 lbs    69

 in                       25.5658 kg/m    1.9553 m                 99 %

 

        2010    1:23:00 PM    130 mmHg    80 mmHg    66 bpm    16 rpm    96.8 F    173 lbs    69 in 

                          25.55 kg/m2    1.95 m2                   100 %

 

        2010    12:58:00 PM    130 mmHg    88 mmHg    75 bpm    16 rpm    98.4 F    172.25 lbs    69

 in                       25.4366 kg/m    1.9503 m                 100 %







Social History







                    Name                Description         Comments

 

                    denies alcohol use                         

 

                    denies smoking                           

 

                    Denies illicit substance abuse                         

 

                    retired                                 direct care

 

                    Single                                   

 

                    Exercises regularly                         

 

                    Attended some college                         

 

                    Tobacco             Never smoker         







History of Procedures







                    Date Ordered        Description         Order Status

 

                    2010 12:00 AM    COMPREHEN METABOLIC PANEL    Reviewed

 

                    2010 12:00 AM    COMPLETE CBC W/AUTO DIFF WBC    Reviewed

 

                    2010 12:00 AM    LIPID PANEL         Reviewed

 

                          2015 12:00 AM        B12 Injection, Up to 1000 Mcg NDC#6056-0787-59 [facility] 

                                        Reviewed

 

                    2011 12:00 AM    MAMMOGRAM SCREENING    Reviewed

 

                    2011 12:00 AM    CYTOPATH C/V THIN LAYER    Reviewed

 

                    2011 12:00 AM    B12 Injection 1 cc NDC#[address]-72    Reviewed

 

                    2015 12:00 AM    THER/PROPH/DIAG INJ SC/IM    Reviewed

 

                    2015 12:00 AM    B12 Injection, Up to 1000 Mcg NDC#4156-6365-88    Reviewed

 

                    2011 12:00 AM    THER/PROPH/DIAG INJ SC/IM    Reviewed

 

                    2011 12:00 AM    B12 Injection([facility]) Ndc#1769-7197-55-    Reviewed

 

                    2015 12:00 AM    THER/PROPH/DIAG INJ SC/IM    Reviewed

 

                    2015 12:00 AM    B12 Injection, Up to 1000 Mcg NDC#8650-4994-91    Reviewed

 

                    10/20/2011 12:00 AM    THER/PROPH/DIAG INJ SC/IM    Reviewed

 

                    10/20/2011 12:00 AM    B12 Injection([facility]) Ndc#5967-1504-84-    Reviewed

 

                    2016 12:00 AM    THER/PROPH/DIAG INJ SC/IM    Reviewed

 

                    2016 12:00 AM    B12 Injection, Up to 1000 Mcg NDC#8667-4992-49    Reviewed

 

                    3/14/2016 12:00 AM    VITAMIN B-12        Reviewed

 

                    3/15/2016 12:00 AM    THER/PROPH/DIAG INJ SC/IM    Reviewed

 

                    3/15/2016 12:00 AM    B12 Injection, Up to 1000 Mcg NDC#2998-7618-21    Reviewed

 

                    2011 12:00 AM    ***Immunization administration, Medicare flu    Reviewed

 

                    2011 12:00 AM    Fluzone ** MEDICARE Only **    Reviewed

 

                    2011 12:00 AM    THER/PROPH/DIAG INJ SC/IM    Reviewed

 

                    2011 12:00 AM    B12 Injection ([facility]) Ndc#5735-4495-57    Reviewed

 

                    2016 12:00 AM    B12 Injection, Up to 1000 Mcg NDC#0262-1283-92 [facility] Medicare    

Reviewed

 

                    2016 12:00 AM    TTE W/DOPPLER COMPLETE    Reviewed

 

                    2016 12:00 AM    EXTREMITY STUDY     Returned

 

                          2016 12:00 AM        B12 Injection, Up to 1000 Mcg NDC#7233-4517-34 [facility] Medicare 

                                        Reviewed

 

                    2016 12:00 AM    THER/PROPH/DIAG INJ SC/IM    Reviewed

 

                    2012 12:00 AM    THER/PROPH/DIAG INJ SC/IM    Reviewed

 

                    2012 12:00 AM    B12 Injection ([facility]) Ndc#8566-7492-52    Reviewed

 

                    2012 12:00 AM    THER/PROPH/DIAG INJ SC/IM    Reviewed

 

                    2012 12:00 AM    B12 Injection([facility]) Ndc#5080-1341-25-    Reviewed

 

                    5/3/2012 12:00 AM    THER/PROPH/DIAG INJ SC/IM    Reviewed

 

                    5/3/2012 12:00 AM    B12 Injection([facility]) Ndc#0721-1695-65-    Reviewed

 

                    2012 12:00 AM    IMMUNOTHERAPY INJECTIONS    Reviewed

 

                    2012 12:00 AM    B12 Injection([facility]) Ndc#8981-7561-12-    Reviewed

 

                    2012 12:00 AM    THER/PROPH/DIAG INJ SC/IM    Reviewed

 

                    2012 12:00 AM    B12 Injection, Up to 1000 Mcg NDC#1732-3982-46    Reviewed

 

                    2012 12:00 AM    THER/PROPH/DIAG INJ SC/IM    Reviewed

 

                    2012 12:00 AM    B12 Injection, Up to 1000 Mcg NDC#3367-3815-74    Reviewed

 

                    2012 12:00 AM    THER/PROPH/DIAG INJ SC/IM    Reviewed

 

                    2012 12:00 AM    B12 Injection, Up to 1000 Mcg NDC#1706-2901-89    Reviewed

 

                    10/16/2012 12:00 AM    THER/PROPH/DIAG INJ SC/IM    Reviewed

 

                    10/16/2012 12:00 AM    B12 Injection, Up to 1000 Mcg NDC#5732-0541-09    Reviewed

 

                    2010 12:00 AM    COMPREHEN METABOLIC PANEL    Reviewed

 

                    2010 12:00 AM    COMPLETE CBC W/AUTO DIFF WBC    Reviewed

 

                    2010 12:00 AM    LIPID PANEL         Reviewed

 

                    2013 12:00 AM    Flu Injection 3 Years And Above NDC# [address]-00  [facility]    Reviewed



 

                    2013 12:00 AM    COMPLETE CBC W/AUTO DIFF WBC    Reviewed

 

                    2013 12:00 AM    ASSAY OF LITHIUM    Reviewed

 

                    2013 12:00 AM    METABOLIC PANEL TOTAL CA    Reviewed

 

                    4/3/2013 12:00 AM    THER/PROPH/DIAG INJ SC/IM    Reviewed

 

                    4/3/2013 12:00 AM    B12 Injection, Up to 1000 Mcg NDC#1890-0265-42    Reviewed

 

                    2013 12:00 AM    THER/PROPH/DIAG INJ SC/IM    Reviewed

 

                    2013 12:00 AM    B12 Injection, Up to 1000 Mcg NDC#9572-1549-26    Reviewed

 

                    2013 12:00 AM    THER/PROPH/DIAG INJ SC/IM    Reviewed

 

                    2013 12:00 AM    B12 Injection, Up to 1000 Mcg NDC#8430-3071-33    Reviewed

 

                    2013 12:00 AM    LIPID PANEL         Reviewed

 

                    2013 12:00 AM    VITAMIN D 25 HYDROXY    Reviewed

 

                    2013 12:00 AM    THER/PROPH/DIAG INJ SC/IM    Reviewed

 

                    3/6/2014 12:00 AM    THER/PROPH/DIAG INJ SC/IM    Reviewed

 

                    2014 12:00 AM    THER/PROPH/DIAG INJ SC/IM    Reviewed

 

                    2014 12:00 AM    B12 Injection, Up to 1000 Mcg NDC#5725-6465-58    Reviewed

 

                    2010 12:00 AM    SKIN FUNGI CULTURE    Reviewed

 

                    10/9/2010 12:00 AM    COMPREHEN METABOLIC PANEL    Reviewed

 

                    10/9/2010 12:00 AM    LIPID PANEL         Reviewed

 

                    2010 12:00 AM    THER/PROPH/DIAG INJ SC/IM    Reviewed

 

                    2010 12:00 AM    B12 Injection Ndc#[address]-73 ([facility])    Reviewed

 

                    2010 12:00 AM    THER/PROPH/DIAG INJ SC/IM    Reviewed

 

                    2010 12:00 AM    Kenalog 40 Mg Im-Ndc#[address]-40 ([facility])    Reviewed

 

                    10/15/2010 12:00 AM    FLU VACCINE 3 YRS & > IM    Reviewed

 

                    10/15/2010 12:00 AM    Admin.Of M/C Cov.Vaccine-Flu Vacc.    Reviewed

 

                    1/15/2011 12:00 AM    COMPLETE CBC W/AUTO DIFF WBC    Reviewed

 

                    1/15/2011 12:00 AM    COMPREHEN METABOLIC PANEL    Reviewed

 

                    1/15/2011 12:00 AM    LIPID PANEL         Reviewed

 

                    2014 12:00 AM    MAMMOGRAM SCREENING    Reviewed

 

                    2014 12:00 AM    Screening mammography, bilateral    Reviewed

 

                    7/10/2014 12:00 AM    THER/PROPH/DIAG INJ SC/IM    Reviewed

 

                    7/10/2014 12:00 AM    B12 Injection, Up to 1000 Mcg NDC#3182-8292-50    Reviewed

 

                    2011 12:00 AM    COMPLETE CBC W/AUTO DIFF WBC    Reviewed

 

                    2011 12:00 AM    COMPREHEN METABOLIC PANEL    Reviewed

 

                    2011 12:00 AM    LIPID PANEL         Reviewed

 

                    2014 12:00 AM    B12 Injection, Up to 1000 Mcg NDC#1644-4178-29    Reviewed

 

                    10/19/2014 12:00 AM    MAMMOGRAM SCREENING    Reviewed

 

                    10/19/2014 12:00 AM    Screening mammography, bilateral    Reviewed

 

                    10/16/2014 12:00 AM    COMPLETE CBC W/AUTO DIFF WBC    Reviewed

 

                    10/16/2014 12:00 AM    COMPREHEN METABOLIC PANEL    Reviewed

 

                    10/16/2014 12:00 AM    IMMUNOASSAY TUMOR     Reviewed

 

                    10/16/2014 12:00 AM    LIPID PANEL         Reviewed

 

                    10/16/2014 12:00 AM    ASSAY OF LITHIUM    Reviewed

 

                    10/16/2014 12:00 AM    MAMMOGRAM SCREENING    Reviewed

 

                    2011 12:00 AM    ASSAY OF PARATHORMONE    Reviewed

 

                    2011 12:00 AM    VITAMIN D 25 HYDROXY    Reviewed

 

                    2011 12:00 AM    ASSAY OF LITHIUM    Reviewed

 

                    2011 12:00 AM    METABOLIC PANEL TOTAL CA    Reviewed

 

                    2011 12:00 AM    CT HEAD/BRAIN W/O & W/DYE    Reviewed

 

                    3/23/2015 12:00 AM    PNEUMOCOCCAL VACC 13 GLENDY IM    Reviewed

 

                    3/23/2015 12:00 AM    Vitamin B12 injection    Reviewed

 

                    2011 12:00 AM    ASSAY OF LITHIUM    Reviewed

 

                    2011 12:00 AM    B12 Injection Ndc#[address]-24  [facility]    Reviewed

 

                    2015 12:00 AM    THER/PROPH/DIAG INJ SC/IM    Reviewed

 

                    2015 12:00 AM    B12 Injection, Up to 1000 Mcg NDC#9173-3879-95    Reviewed

 

                    2015 12:00 AM    COMPLETE CBC W/AUTO DIFF WBC    Reviewed

 

                    2015 12:00 AM    COMPREHEN METABOLIC PANEL    Reviewed

 

                    2015 12:00 AM    LIPID PANEL         Reviewed

 

                    2015 12:00 AM    ASSAY OF LITHIUM    Reviewed

 

                    2011 12:00 AM    VIT D 1 25-DIHYDROXY    Reviewed

 

                    2011 12:00 AM    VITAMIN B-12        Reviewed

 

                    2015 12:00 AM    B12 Injection, Up to 1000 Mcg NDC#1351-4036-08    Reviewed

 

                    2015 12:00 AM    THER/PROPH/DIAG INJ SC/IM    Reviewed

 

                    2015 12:00 AM    B12 Injection, Up to 1000 Mcg NDC#1643-2954-42    Reviewed

 

                    2011 12:00 AM    THER/PROPH/DIAG INJ SC/IM    Reviewed

 

                    2011 12:00 AM    B12 Injection ([facility]) Ndc#9260-9304-60    Reviewed

 

                    2015 12:00 AM    THER/PROPH/DIAG INJ SC/IM    Reviewed

 

                    2015 12:00 AM    B12 Injection, Up to 1000 Mcg NDC#3634-7023-47    Reviewed







Results Summary







                          Data and Description      Results

 

                          2004 12:00 AM        Colonoscopy-Women and Men over 50 Normal 

 

                          2008 12:00 AM         Pap Smear Declined 

 

                          10/7/2009 12:00 AM        Cholest Melva Stone Ql .0 %LDLc SerPl-mCnc 123.0 mg/dLHDLc

 SerPl-mCnc 34.0 mg/dLTrigl SerPl-mCnc 190.0 mg/dLGlucose SerPl-mCnc 78.0 mg/dL

 

                          2009 12:00 AM        Mammogram -Women over 40 Normal HIV1+2 Ab Ser Ql no risk 

 

                          2010 8:47 AM         Dexa Bone Scan Refused Aspirin reccommended Contraindication 



 

                          2010 8:48 AM         Depression Done 

 

                          2010 12:00 AM         Foot Exam-Diabetic Done 

 

                          2010 12:00 AM         Cholest Melva Stone Ql .0 %LDLc SerPl-mCnc 126.0 mg/dLGlucose

 SerPl-mCnc 102.0 mg/dL

 

                          2010 8:45 AM          TRIGLYCERIDES 122.0 mg/dLCHOLESTEROL 186.0 mg/dLHDL 36.0 mg/dLLDL

 (CALC) 126.0 mg/dLGLUCOSE 102.0 mg/dLSODIUM 143.0 mmol/LPOTASSIUM 3.70 
mmol/LCHLORIDE 111.0 mmol/LCO2 23.0 mmol/LBUN 10.0 mg/dLCREATININE 0.80 
mg/dLSGOT/AST 12.0 IU/LSGPT/ALT 11.0 IU/LALK PHOS 65.0 IU/LTOTAL PROTEIN 7.20 
g/dLALBUMIN 3.90 g/dLTOTAL BILI 0.50 mg/dLCALCIUM 10.20 mg/dLeGFR >60 
mL/min/1.73 m2WBC 5.7 RBC 3.26 HGB 10.60 g/dLHCT 31.70 %MCV 97.0 fLMCH 32.50 
pgMCHC 33.40 g/dLRDW CV 13.30 %MPV 9.70 fLPLT 287 %NEUT 62.90 %%LYMP 21.80 
%%MONO 9.90 %%EOS 5.0 %%BASO 0.40 %#NEUT 3.56 #LYMP 1.23 #MONO 0.56 #EOS 0.28 
#BASO 0.02 

 

                          2010 12:00 AM        Glucose SerPl-mCnc 96.0 mg/dLCholest Cry Stone Ql .0 %LDLc

 SerPl-mCnc 146.0 mg/dL

 

                          2010 8:26 AM         TRIGLYCERIDES 106.0 mg/dLCHOLESTEROL 199.0 mg/dLHDL 32.0 mg/dLLDL

 (CALC) 146.0 mg/dLGLUCOSE 96.0 mg/dLSODIUM 143.0 mmol/LPOTASSIUM 4.0 
mmol/LCHLORIDE 113.0 mmol/LCO2 24.0 mmol/LBUN 13.0 mg/dLCREATININE 1.0 
mg/dLSGOT/AST 11.0 IU/LSGPT/ALT 6.0 IU/LALK PHOS 56.0 IU/LTOTAL PROTEIN 6.60 
g/dLALBUMIN 3.80 g/dLTOTAL BILI 0.50 mg/dLCALCIUM 9.30 mg/dLeGFR 57 

 

                          10/6/2010 12:00 AM        Cholest Cry Stone Ql .0 %LDLc SerPl-mCnc 111.0 mg/dLGlucose

 SerPl-mCnc 81.0 mg/dL

 

                          10/6/2010 2:45 PM         TRIGLYCERIDES 123.0 mg/dLCHOLESTEROL 178.0 mg/dLHDL 42.0 mg/dLLDL

 (CALC) 111.0 mg/dLGLUCOSE 81.0 mg/dLSODIUM 139.0 mmol/LPOTASSIUM 4.10 
mmol/LCHLORIDE 106.0 mmol/LCO2 24.0 mmol/LBUN 13.0 mg/dLCREATININE 0.90 
mg/dLSGOT/AST 13.0 IU/LSGPT/ALT 11.0 IU/LALK PHOS 61.0 IU/LTOTAL PROTEIN 7.10 
g/dLALBUMIN 3.90 g/dLTOTAL BILI 0.30 mg/dLCALCIUM 9.30 mg/dLeGFR >60 mL/min/1.73
 m2WBC 6.9 RBC 3.59 HGB 11.50 g/dLHCT 35.30 %MCV 98.0 fLMCH 32.0 pgMCHC 32.60 
g/dLRDW CV 12.90 %MPV 9.90 fLPLT 311 %NEUT 64.90 %%LYMP 22.50 %%MONO 7.20 %%EOS 
5.10 %%BASO 0.30 %#NEUT 4.45 #LYMP 1.54 #MONO 0.49 #EOS 0.35 #BASO 0.02 

 

                          2011 12:00 AM         Mammogram -Women over 40 Ordered 

 

                          2011 10:25 AM        TRIGLYCERIDES 111.0 mg/dLCHOLESTEROL 195.0 mg/dLHDL 43.0 mg/dLLDL

 (CALC) 130.0 mg/dLWBC 5.3 RBC 3.76 HGB 12.0 g/dLHCT 37.80 %.0 fLMCH 
31.90 pgMCHC 31.70 g/dLRDW CV 13.0 %MPV 9.70 fLPLT 259 %NEUT 69.0 %%LYMP 17.60 
%%MONO 8.30 %%EOS 4.70 %%BASO 0.40 %#NEUT 3.63 #LYMP 0.93 #MONO 0.44 #EOS 0.25 
#BASO 0.02 GLUCOSE 102.0 mg/dLSODIUM 146.0 mmol/LPOTASSIUM 4.20 mmol/LCHLORIDE 
113.0 mmol/LCO2 23.0 mmol/LBUN 15.0 mg/dLCREATININE 1.0 mg/dLSGOT/AST 12.0 
IU/LSGPT/ALT 17.0 IU/LALK PHOS 60.0 IU/LTOTAL PROTEIN 6.90 g/dLALBUMIN 4.20 
g/dLTOTAL BILI 0.40 mg/dLCALCIUM 9.70 mg/dLeGFR 57 

 

                          2011 11:49 AM        Cholest Cry Stone Ql .0 %LDLc SerPl-mCnc 130.0 mg/dLHDLc

 SerPl-mCnc 43.0 mg/dLTrigl SerPl-mCnc 111.0 mg/dLGlucose SerPl-mCnc 102.0 mg/dL

 

                          2011 11:52 AM        Pap Smear Declined 

 

                          2011 11:28 AM        Lithium 2.080 mmol/LGLUCOSE 102.0 mg/dLSODIUM 135.0 mmol/LPOTASSIUM

 3.90 mmol/LCHLORIDE 106.0 mmol/LCO2 21.0 mmol/LBUN 12.0 mg/dLCREATININE 1.30 
mg/dLCALCIUM 10.70 mg/dLeGFR 42 

 

                          2011 8:58 AM          Lithium 0.690 mmol/L

 

                          2011 2:38 PM         VITAMIN B12 3483.0 pg/mL

 

                          2013 3:35 PM          WBC 5.1 RBC 3.73 HGB 11.70 g/dLHCT 36.40 %MCV 98.0 fLMCH 31.40

 pgMCHC 32.10 g/dLRDW CV 13.10 %MPV 9.80 fLPLT 224 %NEUT 66.80 %%LYMP 19.10 
%%MONO 9.0 %%EOS 4.90 %%BASO 0.20 %#NEUT 3.42 #LYMP 0.98 #MONO 0.46 #EOS 0.25 
#BASO 0.01 GLUCOSE 88.0 mg/dLSODIUM 141.0 mmol/LPOTASSIUM 4.10 mmol/LCHLORIDE 
110.0 mmol/LCO2 22.0 mmol/LBUN 22.0 mg/dLCREATININE 1.10 mg/dLCALCIUM 9.80 
mg/dLeGFR 50 Lithium 0.760 mmol/L

 

                          2013 11:02 AM        TRIGLYCERIDES 106.0 mg/dLCHOLESTEROL 181.0 mg/dLHDL 46.0 mg/dLLDL

 (CALC) 114.0 mg/dLVITAMIN D 41.10 ng/mL

 

                          10/17/2014 10:10 AM       WBC 5.0 RBC 3.66 HGB 11.60 g/dLHCT 36.80 %.0 fLMCH 31.70

 pgMCHC 31.50 g/dLRDW CV 13.50 %MPV 10.10 fLPLT 209 %NEUT 69.20 %%LYMP 21.0 
%%MONO 6.40 %%EOS 3.20 %%BASO 0.20 %#NEUT 3.46 #LYMP 1.05 #MONO 0.32 #EOS 0.16 
#BASO 0.01 GLUCOSE 100.0 mg/dLSODIUM 148.0 mmol/LPOTASSIUM 3.90 mmol/LCHLORIDE 
114.0 mmol/LCO2 26.0 mmol/LBUN 12.0 mg/dLCREATININE 1.20 mg/dLSGOT/AST 9.0 
IU/LSGPT/ALT <6 IU/LALK PHOS 82.0 IU/LTOTAL PROTEIN 6.90 g/dLALBUMIN 4.0 
g/dLTOTAL BILI 0.40 mg/dLCALCIUM 10.50 mg/dLeGFR 45 TRIGLYCERIDES 96.0 
mg/dLCHOLESTEROL 155.0 mg/dLHDL 38.0 mg/dLLDL (CALC) 98.0 mg/dLLithium 0.850 
mmol/LCancer Antigen (CA) 125 8.30 U/mL

 

                          2015 10:25 AM        Lithium 0.790 mmol/LWBC 4.8 RBC 3.44 HGB 11.0 g/dLHCT 35.20 

%.0 fLMCH 32.0 pgMCHC 31.30 g/dLRDW CV 14.0 %MPV 9.30 fLPLT 210 %NEUT 
70.80 %%LYMP 17.20 %%MONO 8.10 %%EOS 3.50 %%BASO 0.40 %#NEUT 3.41 #LYMP 0.83 
#MONO 0.39 #EOS 0.17 #BASO 0.02 TRIGLYCERIDES 107.0 mg/dLCHOLESTEROL 174.0 
mg/dLHDL 43.0 mg/dLLDL (CALC) 110.0 mg/dLGLUCOSE 90.0 mg/dLSODIUM 145.0 
mmol/LPOTASSIUM 3.80 mmol/LCHLORIDE 115.0 mmol/LCO2 24.0 mmol/LBUN 17.0 mg
/dLCREATININE 1.30 mg/dLSGOT/AST 18.0 IU/LSGPT/ALT 17.0 IU/LALK PHOS 56.0 
IU/LTOTAL PROTEIN 6.70 g/dLALBUMIN 3.90 g/dLTOTAL BILI 0.40 mg/dLCALCIUM 9.80 
mg/dLeGFR 41 

 

                          2015 8:50 AM        WBC 5.8 RBC 3.29 HGB 10.70 g/dLHCT 34.0 %.0 fLMCH 32.50

 pgMCHC 31.50 g/dLRDW CV 13.60 %MPV 9.60 fLPLT 223 %NEUT 69.60 %%LYMP 18.90 
%%MONO 8.50 %%EOS 2.80 %%BASO 0.20 %#NEUT 4.03 #LYMP 1.09 #MONO 0.49 #EOS 0.16 
#BASO 0.01 Lithium 0.620 mmol/LGLUCOSE 83.0 mg/dLSODIUM 139.0 mmol/LPOTASSIUM 
3.90 mmol/LCHLORIDE 109.0 mmol/LCO2 22.0 mmol/LBUN 19.0 mg/dLCREATININE 1.40 
mg/dLSGOT/AST 19.0 IU/LSGPT/ALT 21.0 IU/LALK PHOS 55.0 IU/LTOTAL PROTEIN 6.50 
g/dLALBUMIN 3.90 g/dLTOTAL BILI 0.50 mg/dLCALCIUM 9.60 mg/dLeGFR 38 
TRIGLYCERIDES 121.0 mg/dLCHOLESTEROL 192.0 mg/dLHDL 51.0 mg/dLLDL 121.0 mg/dLTSH
 1.210 uIU/mLHemoglobin A1c 5.40 %

 

                          3/15/2016 8:08 AM         VITAMIN B12 696.0 pg/mL

 

                          3/23/2016 8:26 AM         WBC 7.0 RBC 3.61 HGB 11.80 g/dLHCT 37.70 %.0 fLMCH 32.70

 pgMCHC 31.30 g/dLRDW CV 12.50 %MPV 10.0 fLPLT 207 %NEUT 73.60 %%LYMP 16.40 
%%MONO 6.60 %%EOS 3.0 %%BASO 0.30 %#NEUT 5.15 #LYMP 1.15 #MONO 0.46 #EOS 0.21 
#BASO 0.02 Lithium 0.940 mmol/LGLUCOSE 108.0 mg/dLSODIUM 143.0 mmol/LPOTASSIUM 
4.30 mmol/LCHLORIDE 110.0 mmol/LCO2 27.0 mmol/LBUN 16.0 mg/dLCREATININE 1.60 
mg/dLSGOT/AST 13.0 IU/LSGPT/ALT 7.0 IU/LALK PHOS 71.0 IU/LTOTAL PROTEIN 6.80 
g/dLALBUMIN 4.0 g/dLTOTAL BILI 0.20 mg/dLCALCIUM 10.40 mg/dLeGFR 32 
TRIGLYCERIDES 113.0 mg/dLCHOLESTEROL 169.0 mg/dLHDL 42.0 mg/dLLDL (CALC) 104.0 
mg/dLTSH 2.20 uIU/mLHemoglobin A1c 5.20 %

 

                          3/25/2016 9:17 AM         COLOR YELLOW APPEARANCE CLEAR SPEC GRAV 1.010 pH 7.0 PROTEIN 

NEGATIVE GLUCOSE NEGATIVE mg/dLKETONE NEGATIVE BILIRUBIN NEGATIVE BLOOD NEGATIVE
 NITRITE NEGATIVE LEUK SCREEN SMALL CASTS/LPF NEGATIVE /LPFCRYSTALS NEGATIVE 
MUCOUS THRDS NEGATIVE BACTERIA NEGATIVE EPITH CELLS FEW SQUAMOUS /HPFTRICHOMONAS
 NEGATIVE YEAST NEGATIVE 







History Of Immunizations







       Name    Date Admin    Mfg Name    Mfg Code    Trade Name    Lot#    Route    Inj    Vis Given    Vis

 Pub                                    CVX

 

        Influenza    2008    Not Entered    NE      Not Entered            Not Entered    Not Entered

                    1            999

 

        X       12/19/2008    [employer]    MSD     Pneumovax 23            Intramuscular    Not Entered

                    1            999

 

           Influenza    10/15/2010    [facility].    NOV        Fluvirin > 12 Years    

[id number]     Intramuscular    Left Deltoid    10/15/2010    2009    999

 

          X         3/23/2015    [facility]    [facility]       Prevnar 13    [id number]    Intramuscular

                Right Gluteous Medius    3/23/2015       2013       109







History of Past Illness







                    Name                Date of Onset       Comments

 

                    Peritoneal Neoplasm, Malignant                         

 

                    Hyperlipidemia                           

 

                    Bipolar disorder, unspecified                         

 

                    Artificial opening status; colostomy                         

 

                    B12 deficiency                           

 

                    Anemia, Pernicious                         

 

                    Arthritis unspecified                         

 

                    cervical cancer                          

 

                    Artificial opening status; colostomy    2010  1:10PM     

 

                    Bipolar disorder, unspecified    2010  1:10PM     

 

                    Hyperlipidemia      2010  1:10PM     

 

                    Anemia, Pernicious    2010  1:10PM     

 

                    Postoperative Follow-Up    2010  1:55PM     

 

                    Postoperative Follow-Up    Mar  8 2010 10:57AM     

 

                    Artificial opening status; colostomy    Mar  8 2010  1:19PM     

 

                    Peritoneal Neoplasm, Malignant    Mar  8 2010  1:19PM     

 

                    Artificial opening status; colostomy    2010  1:40PM     

 

                    Hyperlipidemia      2010  1:40PM     

 

                    Anemia, Pernicious    2010  1:40PM     

 

                    Peritoneal Neoplasm, Malignant    2010  1:40PM     

 

                    Arthritis unspecified    2010  1:40PM     

 

                    Anemia of Chronic Illness    2010  1:40PM     

 

                    Tinea corporis      2010  3:17PM     

 

                    Bipolar disorder, unspecified    2010  1:33PM     

 

                    Hyperlipidemia      2010  1:33PM     

 

                    Anemia, Pernicious    2010  1:33PM     

 

                    Peritoneal Neoplasm, Malignant    2010  1:33PM     

 

                    B12 deficiency      2010  1:33PM     

 

                    Ethmoidal Sinusitis, Acute    Sep 21 2010  3:53PM     

 

                    Wheezing            Sep 21 2010  3:53PM     

 

                    Flu                 Oct 15 2010  1:40PM     

 

                    Bipolar disorder, unspecified    Oct 15 2010  1:42PM     

 

                    Hyperlipidemia      Oct 15 2010  1:42PM     

 

                    Anemia, Pernicious    Oct 15 2010  1:42PM     

 

                    Peritoneal Neoplasm, Malignant    Oct 15 2010  1:42PM     

 

                    Bipolar disorder, unspecified    2011 12:01PM     

 

                    Hyperlipidemia      2011 12:01PM     

 

                    Anemia, Pernicious    2011 12:01PM     

 

                    Peritoneal Neoplasm, Malignant    2011 12:01PM     

 

                    Bipolar disorder, unspecified    Apr 15 2011 10:55AM     

 

                    Major Depression    2011 10:11AM     

 

                    Bipolar Disorder    2011 10:11AM     

 

                    Cancer              May 10 2011  4:16PM     

 

                    Major Depression    May 10 2011  3:16PM     

 

                    Bipolar Disorder    May 10 2011  3:16PM     

 

                    Hypercalcemia       May 23 2011  2:47PM     

 

                    Bipolar disorder, unspecified    May 23 2011  2:47PM     

 

                    Colon Cancer, Personal History    May 23 2011  2:47PM     

 

                    Bipolar Disorder    May 31 2011  4:39PM     

 

                    Depressive Disorder    2011 10:01AM     

 

                    Vitamin B12 deficiency    2011 10:01AM     

 

                    Vitamin D Deficiency    2011  5:07PM     

 

                    Anemia, Vitamin B12 Deficiency    2011  5:07PM     

 

                    B12 deficiency      2011  3:56PM     

 

                    Routine gynecological examination    Aug  4 2011  9:08AM     

 

                    Screening Examination for Breast Cancer    Aug  4 2011  9:08AM     

 

                    Tinea Corporis      Aug  4 2011  9:08AM     

 

                    Depressive Disorder    Sep 23 2011  8:47AM     

 

                    Contact Dermatitis    Sep 23 2011  8:47AM     

 

                    Anemia, Pernicious    Sep 23 2011  8:47AM     

 

                    B12 deficiency      Sep 23 2011  8:47AM     

 

                    B12 deficiency      Sep 27 2011  2:58PM     

 

                    B12 deficiency      Oct 20 2011  2:34PM     

 

                    Flu                 Dec  9 2011  3:16PM     

 

                    B12 deficiency      Dec  9 2011  3:17PM     

 

                    B12 deficiency      2012  4:52PM     

 

                    B12 deficiency      2012 11:10AM     

 

                    B12 deficiency      2012  3:37PM     

 

                    B12 deficiency      May  3 2012  4:10PM     

 

                    B12 deficiency      2012  2:54PM     

 

                    B12 deficiency      2012 11:23AM     

 

                    B12 deficiency      Aug  9 2012  2:08PM     

 

                    B12 deficiency      Sep  6 2012  4:36PM     

 

                    B12 deficiency      Oct 16 2012 10:23AM     

 

                    Flu                 Feb  2013  3:11PM     

 

                    Bipolar disorder, unspecified    Feb  2013  2:48PM     

 

                    Anemia, Pernicious    Feb  4   2:48PM     

 

                    B12 deficiency      Feb  4   2:48PM     

 

                    Extrapyramidal abnormal movement disorder    Feb  4   2:48PM     

 

                    B12 deficiency      Apr  3 2013 12:03PM     

 

                    Bipolar disorder, unspecified    May  7 2013  1:31PM     

 

                    Anemia, Pernicious    May  7 2013  1:31PM     

 

                    B12 deficiency      May  7 2013  1:31PM     

 

                    Extrapyramidal abnormal movement disorder    May  7 2013  1:31PM     

 

                    B12 deficiency      2013  3:42PM     

 

                    B12 deficiency      2013  1:31PM     

 

                    Hyperlipidemia      Aug  7 2013 10:37AM     

 

                    Vitamin D Deficiency    Aug  7 2013 10:37AM     

 

                    Bipolar disorder, unspecified    Aug  7 2013 10:37AM     

 

                    Anemia, Pernicious    Aug  7 2013 10:37AM     

 

                    B12 deficiency      Aug  7 2013 10:37AM     

 

                    B12 deficiency      Sep 25 2013 11:15AM     

 

                    B12 deficiency      Dec 11 2013  3:16PM     

 

                    B12 deficiency      Mar  6 2014  1:48PM     

 

                    B12 deficiency      May 21 2014  3:17PM     

 

                    Screening Examination for Breast Cancer    2014  3:23PM     

 

                    Periumbilical abdominal pain    2014  3:23PM     

 

                    B12 deficiency      Jul 10 2014  2:52PM     

 

                    Anemia, Vitamin B12 Deficiency    Aug 13 2014  4:50PM     

 

                    Bipolar disorder    Oct 16 2014 11:13AM     

 

                    Hyperlipidemia      Oct 16 2014 11:13AM     

 

                    Anemia, Pernicious    Oct 16 2014 11:13AM     

 

                    Peritoneal Neoplasm, Malignant    Oct 16 2014 11:13AM     

 

                    Screening breast examination    Oct 16 2014 11:13AM     

 

                    Weight loss         Oct 16 2014 11:13AM     

 

                    Anemia, Pernicious    Mar 23 2015  2:57PM     

 

                    B12 deficiency      Mar 23 2015  2:57PM     

 

                    Need for Prevnar vaccine    Mar 23 2015  2:57PM     

 

                    Bipolar disorder    Mar 23 2015  2:57PM     

 

                    Hyperlipidemia      Mar 23 2015  2:57PM     

 

                    Anemia, Pernicious    Mar 23 2015  2:57PM     

 

                    Peritoneal Neoplasm, Malignant    Mar 23 2015  2:57PM     

 

                    B12 deficiency      May  4 2015  4:48PM     

 

                    Hyperlipidemia      May 13 2015  9:56AM     

 

                    Anemia              May 13 2015  9:56AM     

 

                    Bipolar disorder    May 13 2015  9:56AM     

 

                    Bipolar disorder    May 14 2015  3:27PM     

 

                    Hyperlipidemia      May 14 2015  3:27PM     

 

                    Anemia, Pernicious    May 14 2015  3:27PM     

 

                    Peritoneal Neoplasm, Malignant    May 14 2015  3:27PM     

 

                    B12 deficiency      2015  2:20PM     

 

                    B12 deficiency      2015 11:34AM     

 

                    B12 deficiency      Aug 18 2015  9:06AM     

 

                    Tinea Corporis      Sep 18 2015  8:54AM     

 

                    B12 deficiency      Sep 18 2015  8:54AM     

 

                    B12 deficiency      2015 10:28AM     

 

                    Herpes zoster without complication    Dec  3 2015  9:52AM     

 

                    B12 deficiency      Dec 23 2015 11:21AM     

 

                    B12 deficiency      2016  4:51PM     

 

                    Vitamin B 12 deficiency    Mar 14 2016  5:35PM     

 

                    B12 deficiency      Mar 15 2016 12:14PM     

 

                    B12 deficiency      May  5 2016 11:30AM     

 

                    Edema               May  5 2016 11:30AM     

 

                    Dermatitis          May  5 2016 11:30AM     

 

                    Edema               May 17 2016  8:38AM     

 

                    Shortness of breath    May 17 2016  8:38AM     

 

                    Bilateral edema of lower extremity    2016  2:06PM     

 

                    B12 deficiency      2016  2:06PM     







Payers







           Insurance Name    Company Name    Plan Name    Plan Number    Policy Number    Policy Group

 Number                                 Start Date

 

                    Medicare Part A    Medicare C              [id number]              N/A

 

                          [employer]                 [id number]

                                                    

 

                    Medicare Part A    Medicare - Lab/Xray              [id number]              2006

 

                    Medicare Part B    Medicare [place]              [id number]              2006

 

                          [facility]    [employer]                 50 percent

                                                    2009

 

                    Human    [employer]              [id number]              N/A

 

                    Medicare Part A    Medicare Part A              [id number]              N/A

 

                    Medicare Part A    Medicare Part A              [id number]              2006









History of Encounters







                    Visit Date          Visit Type          Provider

 

                    2016           Office visit        Bret Forte APRFIORELLA

 

                    2016            Office visit        Bhupinder Aspen DO

 

                    3/15/2016           Nurse visit         Bhupinder Aspen DO

 

                    2016            Nurse visit         Bhupinder Aspen DO

 

                    2015          Nurse visit         Bhupinder Aspen DO

 

                    12/3/2015           Office visit        Bhupinder Aspen DO

 

                    2015          Nurse visit         Bhupinder Aspen DO

 

                    2015           Office visit        Bhupinder Aspen DO

 

                    2015           Nurse visit         Bhupinder Aspen DO

 

                    2015            Nurse visit         Bhupinder Aspen DO

 

                    2015            Nurse visit         Bhupinder Aspen DO

 

                    2015           Office visit        Bhupinder Aspen DO

 

                    2015            Nurse visit         Bhupinder Aspen DO

 

                    3/23/2015           Office visit        Bhupinder Aspen DO

 

                    10/16/2014          Office visit        Bhupinder Aspen DO

 

                    2014           Nurse visit         Radha Ontiveros APRFIORELLA

 

                    7/10/2014           Nurse visit         Bhupinder Aspen DO

 

                    2014           Office visit        Bhupinder Aspen DO

 

                    2014           Nurse visit         Bhupinder Aspen DO

 

                    3/6/2014            Nurse visit         Bhupinder Aspen DO

 

                    2014            [facility]            EARNEST Lopez MD

 

                    2013          Nurse visit         Bhupinder Aspen DO

 

                    2013           Nurse visit         Bhupinder Aspen DO

 

                    2013            Office visit        Bhupinder Aspen DO

 

                    2013            Nurse visit         Bhupinder Aspen DO

 

                    2013            Nurse visit         Bhupinder Aspen DO

 

                    2013            Office visit        Bhupinder Aspen DO

 

                    4/3/2013            Nurse visit         Bhupinder Aspen DO

 

                    2013            Office visit        Bhupinder Aspen DO

 

                    10/16/2012          Nurse visit         Bhupinder Aspen DO

 

                    2012            Nurse visit         Bhupinder Aspen DO

 

                    2012            Voided              Bhupinder Aspen DO

 

                    2012            Nurse visit         Bhupinder Aspen DO

 

                    2012            Nurse visit         Bhupinder Aspen DO

 

                    2012           Nurse visit         Bhupinder Aspen DO

 

                    5/3/2012            Nurse visit         Bhupinder Aspen DO

 

                    2012           Nurse visit         Bhupinder Aspen DO

 

                    2012           Nurse visit         Bhupinder Aspen DO

 

                    2012           Nurse visit         Bhupinder Aspen DO

 

                    2011           Nurse visit         Bhupinder Aspen DO

 

                    10/20/2011          Nurse visit         Bhupinder Aspen DO

 

                    2011           Office visit        Bhupinder Aspen DO

 

                    2011           Nurse visit         Radha GREEN

 

                    2011            Office visit        Bhupinder Aspen DO

 

                    2011           Nurse visit         Bhupinder Aspen DO

 

                    2011            Office visit        Bhupinder Aspen DO

 

                    2011           Office visit        Bhupinder Aspen DO

 

                    5/10/2011           Office visit        Bhupinder Aspen DO

 

                    2011           Office visit        Bhupinder Aspen DO

 

                    4/15/2011           Office visit        Devin Angel DO

 

                    2011           Office visit        Devin Angel DO

 

                    10/15/2010          Office visit        Devin Angel DO

 

                    2010           Office visit        Devin Angel DO

 

                    2010            Office visit        Devin Angel DO

 

                    2010           Office visit        Devin Angel DO

 

                    2010            Office visit        Devin Angel DO

 

                    3/8/2010            Office visit        Devin Masterson MD

 

                    2010            Surgery             Dvein Masterson MD

 

                    2010            Office visit        Devin Angel DO

 

                    2010           Surgery             Devin Masterson MD

 

                    2010           Hospital            Devin Masterson MD

 

                    2010           [facility]            Devin Masterson MD

 

                    10/22/2009          Office visit        Devin Angel DO

## 2019-06-26 NOTE — XMS REPORT
MU2 Ambulatory Summary

                             Created on: 2017



Pauline Gan

External Reference #: [id number]

: 1950

Sex: Female



Demographics







                          Address                   [address]

[place]  [id number]

 

                          Home Phone                [phone]

 

                          Preferred Language        English

 

                          Marital Status            Legally 

 

                          Confucianism Affiliation     Unknown

 

                          Race                      White

 

                          Ethnic Group              Not  or 





Author







                          Author                    Bhupinder Louise

 

                          [facility]

 

                          Address                   [address]

[place]  [id number]



 

                          Phone                     [phone]







Care Team Providers







                    Care Team Member Name    Role                Phone

 

                    Bhupinder Louise    PCP                 Unavailable

 

                    Bhupinder Louise    PreferredProvider    Unavailable







Allergies and Adverse Reactions







                    Name                Reaction            Notes

 

                    NO KNOWN DRUG ALLERGIES                         







Plan of Treatment







             Planned Activity    Comments     Planned Date    Planned Time    Plan/Goal

 

             Injection,Subcutaneous/Intramuscul, [facility]                 2017    12:00 AM      







Medications







                                        Active 

 

             Name         Start Date    Estimated Completion Date    SIG          Comments

 

                Latuda 20 mg oral tablet                                    take 1 tablet (20 mg) by oral route once daily with

 food (at least 350 calories)            

 

             pravastatin 40 mg oral tablet    3/30/2015                 TAKE 1 TABLET BY MOUTH DAILY     

 

                Namenda XR 28 mg oral capsule,sprinkle,ER 24hr    2015                       take 1 capsule (28

 mg) by oral route once daily            

 

                Namenda XR 28 mg oral capsule,sprinkle,ER 24hr    2016                       take 1 capsule (28

 mg) by oral route once daily            

 

                potassium chloride 10 mEq oral tablet extended release    2016                       take 1 tablet

 (10 meq) by oral route once daily       

 

             pravastatin 40 mg oral tablet    2016                 TAKE 1 TABLET BY MOUTH DAILY     

 

                Vitamin B-12 1,000 mcg/mL injection solution    2016                       inject 1 milliliter 

(1,000 mcg) by intramuscular route once a month     

 

                potassium chloride 10 mEq oral tablet extended release    2016                      take 1 tablet

 (10 meq) by oral route once daily       

 

                Namenda XR 28 mg oral capsule,sprinkle,ER 24hr    2016                      TAKE 1 CAPSULE BY

 MOUTH EVERY DAY                         

 

                furosemide 40 mg oral tablet    2016                      take 1 tablet (40 mg) by oral route

 once daily                              

 

                mirtazapine 45 mg oral tablet                                    take 1 tablet (45 mg) by oral route once daily

 before bedtime                          

 

             Fish Oil 300-1,000 mg oral capsule                              take 1 capsule by oral route daily     

 

             Vitamin D3 1,000 unit oral tablet                              take 1 tablet by oral route daily     

 

                Calcium 600 600 mg calcium (1,500 mg) oral tablet                                    take 1 tablet by oral route

 daily                                   

 

                Aspirin Low Dose 81 mg oral tablet,delayed release (DR/EC)                                    take 1 tablet 

(81 mg) by oral route once daily         

 

                cefuroxime axetil 500 mg oral tablet    2017       take 1 tablet (500 mg)

 by oral route 2 times per day for 7 days     









                                         

 

             Name         Start Date    Expiration Date    SIG          Comments

 

             Reglan 10mg    3/29/2010    2010    one ac and hs     

 

                Keflex 500 mg oral capsule    2010       10/1/2010       take 1 capsule (500 mg) by oral

 route every 6 hours for 10 days         

 

                Bactrim -160 mg oral tablet    2011       take 1 tablet by oral route

 every 12 hours for 7 days               

 

                triamcinolone acetonide 0.1 % topical cream    2011      apply a thin

 layer to the affected area(s) by topical route 2 times per day     

 

                sertraline 100 mg oral tablet    4/10/2012       5/10/2012       take 1.5 tablets by oral route

 daily for 30 days                       

 

                ergocalciferol (vitamin D2) 50,000 unit oral capsule    4/15/2013       2013       TAKE

 ONE CAPSULE BY MOUTH ONCE A WEEK        

 

                CYANOCOBALAM 1000MCGINJ 1000 milliliter    2013       INJECT 1ML INTRAMUSCULAR

 ONCE A MONTH                            

 

                pravastatin 40 mg oral tablet    3/25/2014       3/20/2015       TAKE ONE TABLET BY MOUTH EVERY

 DAY                                     

 

                          Zostavax (PF) 19,400 unit/0.65 mL subcutaneous suspension for reconstitution    3/23/2015

                    3/24/2015           inject 0.65 milliliter by subcutaneous route once     

 

                famciclovir 500 mg oral tablet    12/3/2015       12/10/2015      take 1 tablet (500 mg) by

 oral route every 8 hours for 7 days     

 

                furosemide 40 mg oral tablet    2016      take 1 tablet (40 mg) by oral

 route once daily                        

 

                Cipro 500 mg oral tablet    2016       take 1 tablet (500 mg) by oral route

 2 times per day for 5 days              

 

                Bactrim -160 mg oral tablet    2016        take 1 tablet by oral route

 every 12 hours for 7 days               

 

                metoclopramide HCl 10 mg oral tablet    2017       take 1 tablet by oral

 route 2 times a day for 50 days         

 

                Macrobid 100 mg oral capsule    2017       take 1 capsule (100 mg) by oral

 route 2 times per day with food for 7 days     

 

                Augmentin 875-125 mg oral tablet    2017       take 1 tablet by oral route

 every 12 hours for 7 days               

 

                amoxicillin 500 mg oral tablet    2017       take 1 tablet (500 mg) by oral

 route every 12 hours for 7 days         









                                        Discontinued 

 

             Name         Start Date    Discontinued Date    SIG          Comments

 

                Tylenol 325 mg oral tablet                    2013        take 1 - 2 tablets (325 -650 mg) by oral

 route every 4-6 hours as needed         

 

                Calcium 600 + D(3) 600 mg(1,500mg) -400 unit oral tablet                    2011       take 1 tablet

 by oral route 2 times a day            no longer taking

 

                Vitamin B-12 1,000 mcg oral tablet extended release    2010       take 1

 tablet by oral route daily             no longer taking

 

                Antifungal (clotrimazole) 1 % topical cream    2010       apply to the 

affected and surrounding areas of skin by topical route 2 times per day morning 
and evening                              

 

                sertraline 100 mg oral tablet    5/10/2011       2011       take 2 tablets (200 mg) by 

oral route once daily                   discontinued by Dr. Serrano

 

                mirtazapine 15 mg oral tablet                    2011        take 1 tablet (15 mg) by oral route 

once daily before bedtime               Dr. Serrano

 

                mirtazapine 15 mg oral tablet                    2011        take 1 tablet (15 mg) by oral route 

once daily before bedtime               dc'd by Dr. Serrano

 

                Pristiq 50 mg oral tablet extended release 24 hr                    2013        take 1 tablet (50

 mg) by oral route once daily           Dr. Zach Sarahstiq 50 mg oral tablet extended release 24 hr                    2013        take 1 tablet (50

 mg) by oral route once daily           dose updated

 

                Vitamin B-12 1,000 mcg/mL injection solution    2011        inject 1 milliliter

 (1,000 mcg) by intramuscular route once a month    on list already

 

                    syringe with needle 1 mL 25 gauge x 1" miscellaneous syringe    2011

                          use for injection once a month     

 

                clotrimazole 1 % topical cream    2011        apply to the affected and surrounding

 areas of skin by topical route 2 times per day in the morning and evening     

 

                Vitamin D2 50,000 unit oral capsule    2011        take 1 capsule (50,000

 unit) by oral route once weekly        generic on list

 

                Pravachol 40 mg oral tablet    2012        take 1 tablet (40 mg) by oral 

route once daily for 90 days            generic on list

 

                lithium carbonate 300 mg oral capsule    2012        take 1 capsule by oral

 route daily                            dose updated

 

                Pristiq 100 mg oral tablet extended release 24 hr                    4/10/2012       take 1 and 1/2 

tablet (150 mg) by oral route once daily    Mental Health provider

 

                Pristiq 100 mg oral tablet extended release 24 hr                    4/10/2012       take 1 and 1/2 

tablet (150 mg) by oral route once daily    Discontinued by Dr Efrain Knight at [facility]

 

                hydroxyzine HCl 50 mg oral tablet    10/16/2014      2015       take 1 tablet (50 mg) 

by oral route at bedtime                 

 

                lithium carbonate 300 mg oral capsule    2015       take 1 capsule (300

 mg) by oral route 2 for 30 days         

 

                fluconazole 100 mg oral tablet    2015       12/3/2015       take 1 tablet (100 mg) by 

oral route once a week                   

 

                ketoconazole 2 % topical cream    2015       12/3/2015       apply to the affected area(s)

 by topical route 2 times per day        

 

                prednisone 10 mg oral tablet    12/3/2015       2016        take 2 tablets (20 mg) by oral

 route once daily for 4 days 1 tablet daily for 4 days 0.5 tablet daily for 4 
days                                     

 

                triamcinolone acetonide 0.1 % topical cream    2016       apply a thin layer

 to the affected area(s) by topical route 2 times per day     

 

                Cipro 500 mg oral tablet    1/15/2017       2017       take 1 tablet (500 mg) by oral route

 every 12 hours for 10 days              







Problem List







                    Description         Status              Onset

 

                    Artificial opening status; colostomy    Active               

 

                    Bipolar disorder, unspecified    Active               

 

                    Hyperlipidemia      Active               

 

                    Peritoneal Neoplasm, Malignant    Active               

 

                    Anemia, Pernicious    Active               

 

                    Arthritis unspecified    Active               

 

                    B12 deficiency      Active               







Vital Signs







      Date    Time    BP-Sys(mm[Hg]    BP-Lynn(mm[Hg])    HR(bpm)    RR(rpm)    Temp    WT    HT    HC    BMI

                    BSA                 BMI Percentile      O2 Sat(%)

 

        2017    3:05:00 PM    134 mmHg    70 mmHg    70 bpm    20 rpm    97.4 F    181 lbs    69 in

                          26.73 kg/m2    2.00 m2                   98 %

 

        2017    11:07:00 AM    124 mmHg    64 mmHg    62 bpm    17 rpm    98.2 F    181.2 lbs    69

 in                       26.7583 kg/m    2.0003 m                 98 %

 

        1/15/2017    3:34:00 PM    148 mmHg    89 mmHg    69 bpm    20 rpm    98.2 F    179 lbs    69 in

                          26.43 kg/m2    1.99 m2                   98 %

 

       2017    1:51:00 PM    160 mmHg    90 mmHg    100 bpm    20 rpm    96.5 F    179 lbs             

                                                                98 %

 

       2016    3:11:00 PM    134 mmHg    76 mmHg    80 bpm    20 rpm    98 F    163 lbs    69 in     

                24.07 kg/m2     1.90 m2                         98 %

 

        2016    2:04:00 PM    142 mmHg    86 mmHg    68 bpm    16 rpm    98.5 F    166 lbs    63 in

                          29.4053 kg/m    1.8295 m                 100 %

 

        2016    11:27:00 AM    148 mmHg    78 mmHg    90 bpm    20 rpm    98.2 F    153 lbs    69 in

                          22.59 kg/m2    1.84 m2                   96 %

 

        12/3/2015    9:50:00 AM    132 mmHg    70 mmHg    62 bpm    16 rpm    97.9 F    145 lbs    69 in

                          21.4125 kg/m    1.7894 m                 100 %

 

        2015    8:52:00 AM    132 mmHg    68 mmHg    52 bpm    20 rpm    97.8 F    141 lbs    69 in

                          20.82 kg/m2    1.76 m2                   100 %

 

        2015    3:25:00 PM    120 mmHg    62 mmHg    72 bpm    16 rpm    98.1 F    136 lbs    69 in

                          20.0835 kg/m    1.733 m                 98 %

 

       3/23/2015    2:55:00 PM    130 mmHg    76 mmHg    68 bpm    18 rpm    97 F    140 lbs    69 in    

                20.67 kg/m2     1.76 m2                         98 %

 

        10/16/2014    11:11:00 AM    120 mmHg    66 mmHg    77 bpm    20 rpm    98 F    130 lbs    69 in

                          19.1974 kg/m    1.6943 m                 100 %

 

        2014    3:21:00 PM    130 mmHg    66 mmHg    63 bpm    18 rpm    97.2 F    160 lbs    69 in

                          23.6276 kg/m    1.88 m2                   99 %

 

        2013    10:35:00 AM    132 mmHg    70 mmHg    66 bpm    20 rpm    98.1 F    157 lbs    69 in

                          23.18 kg/m2    1.862 m                  

 

        2013    1:29:00 PM    132 mmHg    70 mmHg    76 bpm    18 rpm    98.2 F    166 lbs    69 in 

                          24.5137 kg/m    1.91 m2                    

 

       2013    2:46:00 PM    128 mmHg    70 mmHg    76 bpm    16 rpm    98 F    160 lbs    69 in     

                23.63 kg/m2     1.8797 m                       

 

        2011    8:49:00 AM    128 mmHg    78 mmHg    70 bpm    18 rpm    97.9 F    164 lbs    69 in

                          24.2183 kg/m    1.90 m2                    

 

     2011    1:31:00 PM    132 mmHg    68 mmHg    84 bpm         97 F    167 lbs                        

                                         

 

        2011    9:09:00 AM    128 mmHg    70 mmHg    72 bpm    18 rpm    98.2 F    163 lbs    64 in 

                          27.9786 kg/m    1.83 m2                    

 

       2011    10:01:00 AM    132 mmHg    70 mmHg    72 bpm    18 rpm    98.2 F    154 lbs             

                                                                 

 

       2011    2:47:00 PM    128 mmHg    70 mmHg    72 bpm    18 rpm    97.8 F    156 lbs             

                                                                 

 

       5/10/2011    3:16:00 PM    144 mmHg    80 mmHg    72 bpm    18 rpm    98.2 F    158 lbs             

                                                                 

 

        2011    10:11:00 AM    132 mmHg    70 mmHg    70 bpm    18 rpm    98.2 F    168 lbs    69 in

                          24.809 kg/m    1.93 m2                    

 

        4/15/2011    10:52:00 AM    110 mmHg    60 mmHg    75 bpm    16 rpm    97.5 F    172.375 lbs    

69 in                     25.46 kg/m2    1.951 m                 100 %

 

        2011    11:43:00 AM    120 mmHg    82 mmHg    75 bpm    16 rpm    97.2 F    178.5 lbs    69

 in                       26.3596 kg/m    1.99 m2                   100 %

 

        10/15/2010    1:32:00 PM    120 mmHg    70 mmHg    80 bpm    18 rpm    96.6 F    177 lbs    69 in

                          26.14 kg/m2    1.977 m                 100 %

 

        2010    3:50:00 PM    168 mmHg    100 mmHg    82 bpm    18 rpm    97.8 F    177.5 lbs    69

 in                       26.2119 kg/m    1.98 m2                   97 %

 

        2010    1:21:00 PM    140 mmHg    80 mmHg    59 bpm    16 rpm    97.6 F    173.25 lbs    69 

in                        25.58 kg/m2    1.956 m                 100 %

 

        2010    3:02:00 PM    140 mmHg    80 mmHg    61 bpm    16 rpm    97.6 F    173.125 lbs    69

 in                       25.5658 kg/m    1.96 m2                   99 %

 

        2010    1:23:00 PM    130 mmHg    80 mmHg    66 bpm    16 rpm    96.8 F    173 lbs    69 in 

                          25.55 kg/m2    1.9546 m                 100 %

 

        2010    12:58:00 PM    130 mmHg    88 mmHg    75 bpm    16 rpm    98.4 F    172.25 lbs    69

 in                       25.4366 kg/m    1.95 m2                   100 %







Social History







                    Name                Description         Comments

 

                    denies alcohol use                         

 

                    denies smoking                           

 

                    Denies illicit substance abuse                         

 

                    retired                                 direct care

 

                    Single                                   

 

                    Exercises regularly                         

 

                    Attended some college                         

 

                    Tobacco             Never smoker         







History of Procedures







                    Date Ordered        Description         Order Status

 

                    2010 12:00 AM    COMPREHEN METABOLIC PANEL    Reviewed

 

                    2010 12:00 AM    COMPLETE CBC W/AUTO DIFF WBC    Reviewed

 

                    2010 12:00 AM    LIPID PANEL         Reviewed

 

                          2015 12:00 AM        B12 Injection, Up to 1000 Mcg NDC#9729-9279-90 [facility] 

                                        Reviewed

 

                    2011 12:00 AM    MAMMOGRAM SCREENING    Reviewed

 

                    2011 12:00 AM    CYTOPATH C/V THIN LAYER    Reviewed

 

                    2011 12:00 AM    B12 Injection 1 cc NDC#[address]-14    Reviewed

 

                    2015 12:00 AM    THER/PROPH/DIAG INJ SC/IM    Reviewed

 

                    2015 12:00 AM    B12 Injection, Up to 1000 Mcg NDC#6658-4020-82    Reviewed

 

                    2011 12:00 AM    THER/PROPH/DIAG INJ SC/IM    Reviewed

 

                    2011 12:00 AM    B12 Injection([facility]) Ndc#9164-8557-98-    Reviewed

 

                    2015 12:00 AM    THER/PROPH/DIAG INJ SC/IM    Reviewed

 

                    2015 12:00 AM    B12 Injection, Up to 1000 Mcg NDC#3919-2896-25    Reviewed

 

                    10/20/2011 12:00 AM    THER/PROPH/DIAG INJ SC/IM    Reviewed

 

                    10/20/2011 12:00 AM    B12 Injection([facility]) Ndc#9034-7880-65-    Reviewed

 

                    2016 12:00 AM    THER/PROPH/DIAG INJ SC/IM    Reviewed

 

                    2016 12:00 AM    B12 Injection, Up to 1000 Mcg NDC#7007-3417-35    Reviewed

 

                    3/14/2016 12:00 AM    VITAMIN B-12        Reviewed

 

                    3/15/2016 12:00 AM    THER/PROPH/DIAG INJ SC/IM    Reviewed

 

                    3/15/2016 12:00 AM    B12 Injection, Up to 1000 Mcg NDC#6647-6478-94    Reviewed

 

                    2011 12:00 AM    ***Immunization administration, Medicare flu    Reviewed

 

                    2011 12:00 AM    Fluzone ** MEDICARE Only **    Reviewed

 

                    2011 12:00 AM    THER/PROPH/DIAG INJ SC/IM    Reviewed

 

                    2011 12:00 AM    B12 Injection ([employer] [facility]) Ndc#7589-2456-72    Reviewed

 

                    2016 12:00 AM    B12 Injection, Up to 1000 Mcg NDC#2090-0013-43 [facility]    

Reviewed

 

                    2016 12:00 AM    TTE W/DOPPLER COMPLETE    Reviewed

 

                    2016 12:00 AM    EXTREMITY STUDY     Reviewed

 

                          2016 12:00 AM        B12 Injection, Up to 1000 Mcg NDC#7479-8786-74 [facility] 

                                        Reviewed

 

                    2016 12:00 AM    THER/PROPH/DIAG INJ SC/IM    Reviewed

 

                    2016 12:00 AM    B12 Injection, Up to 1000 Mcg NDC#0186-2428-36    Reviewed

 

                    2016 12:00 AM    THER/PROPH/DIAG INJ SC/IM    Reviewed

 

                    2012 12:00 AM    B12 Injection([place]) Ndc#3768-7359-84-    Reviewed

 

                    2016 12:00 AM    B12 Injection, Up to 1000 Mcg NDC#5719-2795-32    Reviewed

 

                    2016 12:00 AM    THER/PROPH/DIAG INJ SC/IM    Reviewed

 

                    2012 12:00 AM    THER/PROPH/DIAG INJ SC/IM    Reviewed

 

                    2012 12:00 AM    B12 Injection ([employer]) Ndc#5330-2086-67    Reviewed

 

                    2016 12:00 AM    THER/PROPH/DIAG INJ SC/IM    Reviewed

 

                    2016 12:00 AM    B12 Injection, Up to 1000 Mcg NDC#4400-2929-33    Reviewed

 

                    2016 12:00 AM    B12 Injection, Up to 1000 Mcg NDC#0997-5006-34    Reviewed

 

                    2016 12:00 AM    THER/PROPH/DIAG INJ SC/IM    Reviewed

 

                    2012 12:00 AM    THER/PROPH/DIAG INJ SC/IM    Reviewed

 

                    2012 12:00 AM    B12 Injection([facility]) Ndc#9941-5753-00-    Reviewed

 

                    12/15/2016 12:00 AM    B12 Injection, Up to 1000 Mcg NDC#7546-5593-60    Reviewed

 

                    12/15/2016 12:00 AM    THER/PROPH/DIAG INJ SC/IM    Reviewed

 

                    2016 12:00 AM    URNLS DIP STICK/TABLET RGNT AUTO W/O MICROSCOPY    Returned

 

                    1/3/2017 12:00 AM    URNLS DIP STICK/TABLET RGNT AUTO W/O MICROSCOPY    Returned

 

                    2017 12:00 AM    URINE CULTURE/COLONY COUNT    Returned

 

                    2017 12:00 AM    Rocephin 1 gram Injection, [facility]    Reviewed

 

                    2017 12:00 AM    THERAPEUTIC PROPHYLACTIC/DX INJECTION SUBQ/IM    Reviewed

 

                    2017 12:00 AM    B12 1000mcg Injection, [facility]    Reviewed

 

                    5/3/2012 12:00 AM    THER/PROPH/DIAG INJ SC/IM    Reviewed

 

                    5/3/2012 12:00 AM    B12 Injection([facility]) Ndc#8486-5049-12-    Reviewed

 

                    2017 12:00 AM    THERAPEUTIC PROPHYLACTIC/DX INJECTION SUBQ/IM    Reviewed

 

                    2017 12:00 AM    B12 1000mcg Injection, [facility]    Reviewed

 

                    2017 12:00 AM    MRI BRAIN STEM W/O & W/DYE    Reviewed

 

                    2017 12:00 AM    VITAMIN B-12        Reviewed

 

                    2017 12:00 AM    Speech Therapy Consult    Reviewed

 

                    2017 12:00 AM    ASSAY OF CREATININE    Reviewed

 

                    2012 12:00 AM    IMMUNOTHERAPY INJECTIONS    Reviewed

 

                    2012 12:00 AM    B12 Injection([place]) Ndc#9702-4817-20-    Reviewed

 

                    2017 12:00 AM    URINALYSIS AUTO W/SCOPE    Reviewed

 

                    2012 12:00 AM    THER/PROPH/DIAG INJ SC/IM    Reviewed

 

                    2012 12:00 AM    B12 Injection, Up to 1000 Mcg NDC#4368-1853-15    Reviewed

 

                    2012 12:00 AM    THER/PROPH/DIAG INJ SC/IM    Reviewed

 

                    2012 12:00 AM    B12 Injection, Up to 1000 Mcg NDC#8467-1402-52    Reviewed

 

                    2012 12:00 AM    THER/PROPH/DIAG INJ SC/IM    Reviewed

 

                    2012 12:00 AM    B12 Injection, Up to 1000 Mcg NDC#3675-3228-45    Reviewed

 

                    10/16/2012 12:00 AM    THER/PROPH/DIAG INJ SC/IM    Reviewed

 

                    10/16/2012 12:00 AM    B12 Injection, Up to 1000 Mcg NDC#9265-9724-72    Reviewed

 

                    2010 12:00 AM    COMPREHEN METABOLIC PANEL    Reviewed

 

                    2010 12:00 AM    COMPLETE CBC W/AUTO DIFF WBC    Reviewed

 

                    2010 12:00 AM    LIPID PANEL         Reviewed

 

                    2013 12:00 AM    Flu Injection 3 Years And Above NDC# [address]-94  [facility]    Reviewed



 

                    2013 12:00 AM    COMPLETE CBC W/AUTO DIFF WBC    Reviewed

 

                    2013 12:00 AM    ASSAY OF LITHIUM    Reviewed

 

                    2013 12:00 AM    METABOLIC PANEL TOTAL CA    Reviewed

 

                    4/3/2013 12:00 AM    THER/PROPH/DIAG INJ SC/IM    Reviewed

 

                    4/3/2013 12:00 AM    B12 Injection, Up to 1000 Mcg NDC#8646-9194-54    Reviewed

 

                    2013 12:00 AM    THER/PROPH/DIAG INJ SC/IM    Reviewed

 

                    2013 12:00 AM    B12 Injection, Up to 1000 Mcg NDC#2215-0723-79    Reviewed

 

                    2013 12:00 AM    THER/PROPH/DIAG INJ SC/IM    Reviewed

 

                    2013 12:00 AM    B12 Injection, Up to 1000 Mcg NDC#3755-4935-73    Reviewed

 

                    2013 12:00 AM    LIPID PANEL         Reviewed

 

                    2013 12:00 AM    VITAMIN D 25 HYDROXY    Reviewed

 

                    2013 12:00 AM    THER/PROPH/DIAG INJ SC/IM    Reviewed

 

                    2013 12:00 AM    B12 Injection, Up to 1000 Mcg NDC#4899-1312-46    Reviewed

 

                    2013 12:00 AM    THER/PROPH/DIAG INJ SC/IM    Reviewed

 

                    3/6/2014 12:00 AM    THER/PROPH/DIAG INJ SC/IM    Reviewed

 

                    2014 12:00 AM    THER/PROPH/DIAG INJ SC/IM    Reviewed

 

                    2014 12:00 AM    B12 Injection, Up to 1000 Mcg NDC#2397-2551-21    Reviewed

 

                    2010 12:00 AM    SKIN FUNGI CULTURE    Reviewed

 

                    10/9/2010 12:00 AM    COMPREHEN METABOLIC PANEL    Reviewed

 

                    10/9/2010 12:00 AM    LIPID PANEL         Reviewed

 

                    2010 12:00 AM    THER/PROPH/DIAG INJ SC/IM    Reviewed

 

                    2010 12:00 AM    B12 Injection Ndc#[address]-17 ([facility])    Reviewed

 

                    2010 12:00 AM    THER/PROPH/DIAG INJ SC/IM    Reviewed

 

                    2010 12:00 AM    Kenalog 40 Mg Im-Ndc#[address]-14 ([facility])    Reviewed

 

                    10/15/2010 12:00 AM    FLU VACCINE 3 YRS & > IM    Reviewed

 

                    10/15/2010 12:00 AM    Admin.Of M/C Cov.Vaccine-Flu Vacc.    Reviewed

 

                    1/15/2011 12:00 AM    COMPLETE CBC W/AUTO DIFF WBC    Reviewed

 

                    1/15/2011 12:00 AM    COMPREHEN METABOLIC PANEL    Reviewed

 

                    1/15/2011 12:00 AM    LIPID PANEL         Reviewed

 

                    2014 12:00 AM    MAMMOGRAM SCREENING    Reviewed

 

                    2014 12:00 AM    Screening mammography, bilateral    Reviewed

 

                    7/10/2014 12:00 AM    THER/PROPH/DIAG INJ SC/IM    Reviewed

 

                    7/10/2014 12:00 AM    B12 Injection, Up to 1000 Mcg NDC#1912-0971-73    Reviewed

 

                    2011 12:00 AM    COMPLETE CBC W/AUTO DIFF WBC    Reviewed

 

                    2011 12:00 AM    COMPREHEN METABOLIC PANEL    Reviewed

 

                    2011 12:00 AM    LIPID PANEL         Reviewed

 

                    2014 12:00 AM    B12 Injection, Up to 1000 Mcg NDC#8465-4175-01    Reviewed

 

                    10/19/2014 12:00 AM    MAMMOGRAM SCREENING    Reviewed

 

                    10/19/2014 12:00 AM    Screening mammography, bilateral    Reviewed

 

                    10/16/2014 12:00 AM    B12 Injection, Up to 1000 Mcg NDC#9842-3354-35    Reviewed

 

                    10/16/2014 12:00 AM    COMPLETE CBC W/AUTO DIFF WBC    Reviewed

 

                    10/16/2014 12:00 AM    COMPREHEN METABOLIC PANEL    Reviewed

 

                    10/16/2014 12:00 AM    IMMUNOASSAY TUMOR     Reviewed

 

                    10/16/2014 12:00 AM    LIPID PANEL         Reviewed

 

                    10/16/2014 12:00 AM    ASSAY OF LITHIUM    Reviewed

 

                    10/16/2014 12:00 AM    MAMMOGRAM SCREENING    Reviewed

 

                    2011 12:00 AM    ASSAY OF PARATHORMONE    Reviewed

 

                    2011 12:00 AM    VITAMIN D 25 HYDROXY    Reviewed

 

                    2011 12:00 AM    ASSAY OF LITHIUM    Reviewed

 

                    2011 12:00 AM    METABOLIC PANEL TOTAL CA    Reviewed

 

                    2011 12:00 AM    CT HEAD/BRAIN W/O & W/DYE    Reviewed

 

                    3/23/2015 12:00 AM    PNEUMOCOCCAL VACC 13 GLENDY IM    Reviewed

 

                    3/23/2015 12:00 AM    Vitamin B12 injection    Reviewed

 

                    2011 12:00 AM    ASSAY OF LITHIUM    Reviewed

 

                    2011 12:00 AM    B12 Injection Ndc#[address]-72  [facility]    Reviewed

 

                    2015 12:00 AM    THER/PROPH/DIAG INJ SC/IM    Reviewed

 

                    2015 12:00 AM    B12 Injection, Up to 1000 Mcg NDC#7420-3534-88    Reviewed

 

                    2015 12:00 AM    COMPLETE CBC W/AUTO DIFF WBC    Reviewed

 

                    2015 12:00 AM    COMPREHEN METABOLIC PANEL    Reviewed

 

                    2015 12:00 AM    LIPID PANEL         Reviewed

 

                    2015 12:00 AM    ASSAY OF LITHIUM    Reviewed

 

                    2011 12:00 AM    VIT D 1 25-DIHYDROXY    Reviewed

 

                    2011 12:00 AM    VITAMIN B-12        Reviewed

 

                    2015 12:00 AM    B12 Injection, Up to 1000 Mcg NDC#7549-0659-62    Reviewed

 

                    2015 12:00 AM    THER/PROPH/DIAG INJ SC/IM    Reviewed

 

                    2015 12:00 AM    B12 Injection, Up to 1000 Mcg NDC#2691-3143-04    Reviewed

 

                    2011 12:00 AM    THER/PROPH/DIAG INJ SC/IM    Reviewed

 

                    2011 12:00 AM    B12 Injection ([employer]) Ndc#1921-5999-14    Reviewed

 

                    2015 12:00 AM    THER/PROPH/DIAG INJ SC/IM    Reviewed

 

                    2015 12:00 AM    B12 Injection, Up to 1000 Mcg NDC#1583-3722-26    Reviewed







Results Summary







                          Data and Description      Results

 

                          2004 12:00 AM        Colonoscopy-Women and Men over 50 Normal 

 

                          2008 12:00 AM         Pap Smear Declined 

 

                          10/7/2009 12:00 AM        Cholest Cry Stone Ql .0 %LDLc SerPl-mCnc 123.0 mg/dLHDLc

 SerPl-mCnc 34.0 mg/dLTrigl SerPl-mCnc 190.0 mg/dLGlucose SerPl-mCnc 78.0 mg/dL

 

                          2009 12:00 AM        Mammogram -Women over 40 Normal HIV1+2 Ab Ser Ql no risk 

 

                          2010 8:47 AM         Dexa Bone Scan Refused Aspirin reccommended Contraindication 



 

                          2010 8:48 AM         Depression Done 

 

                          2010 12:00 AM         Foot Exam-Diabetic Done 

 

                          2010 12:00 AM         Cholest Cry Stone Ql .0 %LDLc SerPl-mCnc 126.0 mg/dLGlucose

 SerPl-mCnc 102.0 mg/dL

 

                          2010 8:45 AM          TRIGLYCERIDES 122.0 mg/dLCHOLESTEROL 186.0 mg/dLHDL 36.0 mg/dLTOT

 CHOL/HDL 5.2 LDL (CALC) 126.0 mg/dLGLUCOSE 102.0 mg/dLSODIUM 143.0 
mmol/LPOTASSIUM 3.70 mmol/LCHLORIDE 111.0 mmol/LCO2 23.0 mmol/LBUN 10.0 
mg/dLCREATININE 0.80 mg/dLSGOT/AST 12.0 IU/LSGPT/ALT 11.0 IU/LALK PHOS 65.0 
IU/LTOTAL PROTEIN 7.20 g/dLALBUMIN 3.90 g/dLTOTAL BILI 0.50 mg/dLCALCIUM 10.20 
mg/dLAGE 59 GFR NonAA 73 GFR AA 88 eGFR >60 mL/min/1.73 m2eGFR AA* >60 WBC 5.7 
RBC 3.26 HGB 10.60 g/dLHCT 31.70 %MCV 97.0 fLMCH 32.50 pgMCHC 33.40 g/dLRDW SD 
47 RDW CV 13.30 %MPV 9.70 fLPLT 287 NRBC# 0.00 NRBC% 0.0 %NEUT 62.90 %%LYMP 
21.80 %%MONO 9.90 %%EOS 5.0 %%BASO 0.40 %#NEUT 3.56 #LYMP 1.23 #MONO 0.56 #EOS 
0.28 #BASO 0.02 MANUAL DIFF NOT IND 

 

                          2010 12:00 AM        Glucose SerPl-mCnc 96.0 mg/dLCholest Cry Stone Ql .0 %LDLc

 SerPl-mCnc 146.0 mg/dL

 

                          2010 8:26 AM         TRIGLYCERIDES 106.0 mg/dLCHOLESTEROL 199.0 mg/dLHDL 32.0 mg/dLTOT

 CHOL/HDL 6.2 LDL (CALC) 146.0 mg/dLGLUCOSE 96.0 mg/dLSODIUM 143.0 
mmol/LPOTASSIUM 4.0 mmol/LCHLORIDE 113.0 mmol/LCO2 24.0 mmol/LBUN 13.0 
mg/dLCREATININE 1.0 mg/dLSGOT/AST 11.0 IU/LSGPT/ALT 6.0 IU/LALK PHOS 56.0 
IU/LTOTAL PROTEIN 6.60 g/dLALBUMIN 3.80 g/dLTOTAL BILI 0.50 mg/dLCALCIUM 9.30 
mg/dLAGE 59 GFR NonAA 57 GFR AA 69 eGFR 57 eGFR AA* >60 

 

                          10/6/2010 12:00 AM        Cholest Cry Stone Ql .0 %LDLc SerPl-mCnc 111.0 mg/dLGlucose

 SerPl-mCnc 81.0 mg/dL

 

                          10/6/2010 2:45 PM         TRIGLYCERIDES 123.0 mg/dLCHOLESTEROL 178.0 mg/dLHDL 42.0 mg/dLTOT

 CHOL/HDL 4.2 LDL (CALC) 111.0 mg/dLGLUCOSE 81.0 mg/dLSODIUM 139.0 
mmol/LPOTASSIUM 4.10 mmol/LCHLORIDE 106.0 mmol/LCO2 24.0 mmol/LBUN 13.0 
mg/dLCREATININE 0.90 mg/dLSGOT/AST 13.0 IU/LSGPT/ALT 11.0 IU/LALK PHOS 61.0 
IU/LTOTAL PROTEIN 7.10 g/dLALBUMIN 3.90 g/dLTOTAL BILI 0.30 mg/dLCALCIUM 9.30 
mg/dLAGE 60 GFR NonAA 64 GFR AA 78 eGFR >60 mL/min/1.73 m2eGFR AA* >60 WBC 6.9 
RBC 3.59 HGB 11.50 g/dLHCT 35.30 %MCV 98.0 fLMCH 32.0 pgMCHC 32.60 g/dLRDW SD 46
 RDW CV 12.90 %MPV 9.90 fLPLT 311 NRBC# 0.00 NRBC% 0.0 %NEUT 64.90 %%LYMP 22.50 
%%MONO 7.20 %%EOS 5.10 %%BASO 0.30 %#NEUT 4.45 #LYMP 1.54 #MONO 0.49 #EOS 0.35 
#BASO 0.02 MANUAL DIFF NOT IND 

 

                          2011 12:00 AM         Mammogram -Women over 40 Ordered 

 

                          2011 10:25 AM        TRIGLYCERIDES 111.0 mg/dLCHOLESTEROL 195.0 mg/dLHDL 43.0 mg/dLTOT

 CHOL/HDL 4.5 LDL (CALC) 130.0 mg/dLWBC 5.3 RBC 3.76 HGB 12.0 g/dLHCT 37.80 %MCV
 101.0 fLMCH 31.90 pgMCHC 31.70 g/dLRDW SD 47 RDW CV 13.0 %MPV 9.70 fLPLT 259 
NRBC# 0.00 NRBC% 0.0 %NEUT 69.0 %%LYMP 17.60 %%MONO 8.30 %%EOS 4.70 %%BASO 0.40 
%#NEUT 3.63 #LYMP 0.93 #MONO 0.44 #EOS 0.25 #BASO 0.02 MANUAL DIFF NOT IND 
GLUCOSE 102.0 mg/dLSODIUM 146.0 mmol/LPOTASSIUM 4.20 mmol/LCHLORIDE 113.0 
mmol/LCO2 23.0 mmol/LBUN 15.0 mg/dLCREATININE 1.0 mg/dLSGOT/AST 12.0 
IU/LSGPT/ALT 17.0 IU/LALK PHOS 60.0 IU/LTOTAL PROTEIN 6.90 g/dLALBUMIN 4.20 
g/dLTOTAL BILI 0.40 mg/dLCALCIUM 9.70 mg/dLAGE 60 GFR NonAA 57 GFR AA 69 eGFR 57
 eGFR AA* >60 

 

                          2011 11:49 AM        Cholest Cry Stone Ql .0 %LDLc SerPl-mCnc 130.0 mg/dLHDLc

 SerPl-mCnc 43.0 mg/dLTrigl SerPl-mCnc 111.0 mg/dLGlucose SerPl-mCnc 102.0 mg/dL

 

                          2011 11:52 AM        Pap Smear Declined 

 

                          2011 11:28 AM        Lithium 2.080 mmol/LGLUCOSE 102.0 mg/dLSODIUM 135.0 mmol/LPOTASSIUM

 3.90 mmol/LCHLORIDE 106.0 mmol/LCO2 21.0 mmol/LBUN 12.0 mg/dLCREATININE 1.30 
mg/dLCALCIUM 10.70 mg/dLAGE 60 GFR NonAA 42 GFR AA 51 eGFR 42 eGFR AA* 51 

 

                          2011 8:58 AM          Lithium 0.690 mmol/L

 

                          2011 2:38 PM         VITAMIN B12 3483.0 pg/mL

 

                          2013 3:35 PM          WBC 5.1 RBC 3.73 HGB 11.70 g/dLHCT 36.40 %MCV 98.0 fLMCH 31.40

 pgMCHC 32.10 g/dLRDW SD 47 RDW CV 13.10 %MPV 9.80 fLPLT 224 NRBC# 0.00 NRBC% 
0.0 %NEUT 66.80 %%LYMP 19.10 %%MONO 9.0 %%EOS 4.90 %%BASO 0.20 %#NEUT 3.42 #LYMP
 0.98 #MONO 0.46 #EOS 0.25 #BASO 0.01 MANUAL DIFF NOT IND GLUCOSE 88.0 
mg/dLSODIUM 141.0 mmol/LPOTASSIUM 4.10 mmol/LCHLORIDE 110.0 mmol/LCO2 22.0 
mmol/LBUN 22.0 mg/dLCREATININE 1.10 mg/dLCALCIUM 9.80 mg/dLAGE 62 GFR NonAA 50 
GFR AA 61 eGFR 50 eGFR AA* 60 Lithium 0.760 mmol/L

 

                          2013 11:02 AM        TRIGLYCERIDES 106.0 mg/dLCHOLESTEROL 181.0 mg/dLHDL 46.0 mg/dLTOT

 CHOL/HDL 3.9 LDL (CALC) 114.0 mg/dLVITAMIN D 41.10 ng/mL

 

                          10/17/2014 10:10 AM       WBC 5.0 RBC 3.66 HGB 11.60 g/dLHCT 36.80 %.0 fLMCH 31.70

 pgMCHC 31.50 g/dLRDW SD 50 RDW CV 13.50 %MPV 10.10 fLPLT 209 NRBC# 0.00 NRBC% 
0.0 %NEUT 69.20 %%LYMP 21.0 %%MONO 6.40 %%EOS 3.20 %%BASO 0.20 %#NEUT 3.46 #LYMP
 1.05 #MONO 0.32 #EOS 0.16 #BASO 0.01 MANUAL DIFF NOT IND GLUCOSE 100.0 
mg/dLSODIUM 148.0 mmol/LPOTASSIUM 3.90 mmol/LCHLORIDE 114.0 mmol/LCO2 26.0 
mmol/LBUN 12.0 mg/dLCREATININE 1.20 mg/dLSGOT/AST 9.0 IU/LSGPT/ALT <6 IU/LALK 
PHOS 82.0 IU/LTOTAL PROTEIN 6.90 g/dLALBUMIN 4.0 g/dLTOTAL BILI 0.40 
mg/dLCALCIUM 10.50 mg/dLAGE 64 GFR NonAA 45 GFR AA 55 eGFR 45 eGFR AA* 55 
TRIGLYCERIDES 96.0 mg/dLCHOLESTEROL 155.0 mg/dLHDL 38.0 mg/dLTOT CHOL/HDL 4.1 
LDL (CALC) 98.0 mg/dLLithium 0.850 mmol/LCancer Antigen (CA) 125 8.30 U/mL

 

                          2015 10:25 AM        Lithium 0.790 mmol/LWBC 4.8 RBC 3.44 HGB 11.0 g/dLHCT 35.20 

%.0 fLMCH 32.0 pgMCHC 31.30 g/dLRDW SD 53 RDW CV 14.0 %MPV 9.30 fLPLT 210
 NRBC# 0.00 NRBC% 0.0 %NEUT 70.80 %%LYMP 17.20 %%MONO 8.10 %%EOS 3.50 %%BASO 
0.40 %#NEUT 3.41 #LYMP 0.83 #MONO 0.39 #EOS 0.17 #BASO 0.02 MANUAL DIFF NOT IND 
TRIGLYCERIDES 107.0 mg/dLCHOLESTEROL 174.0 mg/dLHDL 43.0 mg/dLTOT CHOL/HDL 4.0 
LDL (CALC) 110.0 mg/dLGLUCOSE 90.0 mg/dLSODIUM 145.0 mmol/LPOTASSIUM 3.80 
mmol/LCHLORIDE 115.0 mmol/LCO2 24.0 mmol/LBUN 17.0 mg/dLCREATININE 1.30 
mg/dLSGOT/AST 18.0 IU/LSGPT/ALT 17.0 IU/LALK PHOS 56.0 IU/LTOTAL PROTEIN 6.70 
g/dLALBUMIN 3.90 g/dLTOTAL BILI 0.40 mg/dLCALCIUM 9.80 mg/dLAGE 64 GFR NonAA 41 
GFR AA 50 eGFR 41 eGFR AA* 50 

 

                          2015 8:50 AM        WBC 5.8 RBC 3.29 HGB 10.70 g/dLHCT 34.0 %.0 fLMCH 32.50

 pgMCHC 31.50 g/dLRDW SD 52 RDW CV 13.60 %MPV 9.60 fLPLT 223 NRBC# 0.00 NRBC% 
0.0 %NEUT 69.60 %%LYMP 18.90 %%MONO 8.50 %%EOS 2.80 %%BASO 0.20 %#NEUT 4.03 
#LYMP 1.09 #MONO 0.49 #EOS 0.16 #BASO 0.01 MANUAL DIFF NOT IND Lithium 0.620 
mmol/LGLUCOSE 83.0 mg/dLSODIUM 139.0 mmol/LPOTASSIUM 3.90 mmol/LCHLORIDE 109.0 
mmol/LCO2 22.0 mmol/LBUN 19.0 mg/dLCREATININE 1.40 mg/dLSGOT/AST 19.0 
IU/LSGPT/ALT 21.0 IU/LALK PHOS 55.0 IU/LTOTAL PROTEIN 6.50 g/dLALBUMIN 3.90 
g/dLTOTAL BILI 0.50 mg/dLCALCIUM 9.60 mg/dLAGE 65 GFR NonAA 38 GFR AA 46 eGFR 38
 eGFR AA* 46 TRIGLYCERIDES 121.0 mg/dLCHOLESTEROL 192.0 mg/dLHDL 51.0 mg/dLTOT 
CHOL/HDL 3.8 .0 mg/dLFREE T4 0.79 TSH 1.210 uIU/mLHemoglobin A1c 5.40 
%Estim. Avg Glu (eAG) 108 

 

                          3/15/2016 8:08 AM         VITAMIN B12 696.0 pg/mL

 

                          3/23/2016 8:26 AM         WBC 7.0 RBC 3.61 HGB 11.80 g/dLHCT 37.70 %.0 fLMCH 32.70

 pgMCHC 31.30 g/dLRDW SD 49 RDW CV 12.50 %MPV 10.0 fLPLT 207 NRBC# 0.00 NRBC% 
0.0 %NEUT 73.60 %%LYMP 16.40 %%MONO 6.60 %%EOS 3.0 %%BASO 0.30 %#NEUT 5.15 #LYMP
 1.15 #MONO 0.46 #EOS 0.21 #BASO 0.02 MANUAL DIFF NOT IND Lithium 0.940 
mmol/LGLUCOSE 108.0 mg/dLSODIUM 143.0 mmol/LPOTASSIUM 4.30 mmol/LCHLORIDE 110.0 
mmol/LCO2 27.0 mmol/LBUN 16.0 mg/dLCREATININE 1.60 mg/dLSGOT/AST 13.0 
IU/LSGPT/ALT 7.0 IU/LALK PHOS 71.0 IU/LTOTAL PROTEIN 6.80 g/dLALBUMIN 4.0 
g/dLTOTAL BILI 0.20 mg/dLCALCIUM 10.40 mg/dLAGE 65 GFR NonAA 32 GFR AA 39 eGFR 
32 eGFR AA* 39 TRIGLYCERIDES 113.0 mg/dLCHOLESTEROL 169.0 mg/dLHDL 42.0 mg/dLTOT
 CHOL/HDL 4.0 LDL (CALC) 104.0 mg/dLFREE T4 0.86 TSH 2.20 uIU/mLHemoglobin A1c 
5.20 %Estim. Avg Glu (eAG) 103 

 

                          3/25/2016 9:17 AM         COLOR YELLOW APPEARANCE CLEAR SPEC GRAV 1.010 pH 7.0 PROTEIN 

NEGATIVE GLUCOSE NEGATIVE mg/dLKETONE NEGATIVE BILIRUBIN NEGATIVE BLOOD NEGATIVE
 NITRITE NEGATIVE [place]UK SCREEN SMALL MICRO IND? SEE BELOW WBC/HPF 0-5 RBC/HPF 
NEGATIVE CASTS/LPF NEGATIVE /LPFCRYSTALS NEGATIVE MUCOUS THRDS NEGATIVE BACTERIA
 NEGATIVE EPITH CELLS FEW SQUAMOUS /HPFTRICHOMONAS NEGATIVE YEAST NEGATIVE 

 

                          2016 6:00 AM        GLUCOSE 91.0 mg/dLSODIUM 143.0 mmol/LPOTASSIUM 3.60 mmol/LCHLORIDE

 112.0 mmol/LCO2 23.0 mmol/LBUN 22.0 mg/dLCREATININE 1.20 mg/dLSGOT/AST 15.0 
IU/LSGPT/ALT 12.0 IU/LALK PHOS 61.0 IU/LTOTAL PROTEIN 5.40 g/dLALBUMIN 3.10 
g/dLTOTAL BILI 0.40 mg/dLCALCIUM 8.40 mg/dLAGE 66 GFR NonAA 45 GFR AA 55 eGFR 45
 eGFR AA* 55 WBC 3.0 RBC 3.05 HGB 9.80 g/dLHCT 32.10 %.0 fLMCH 32.10 
pgMCHC 30.50 g/dLRDW SD 54 RDW CV 14.20 %MPV 10.10 fLPLT 170 NRBC# 0.00 NRBC% 
0.0 %NEUT 50.70 %%LYMP 32.60 %%MONO 10.50 %%EOS 5.90 %%BASO 0.30 %#NEUT 1.54 
#LYMP 0.99 #MONO 0.32 #EOS 0.18 #BASO 0.01 MANUAL DIFF NOT IND 

 

                          2016 2:09 PM        COLOR YELLOW APPEARANCE CLEAR SPEC GRAV 1.010 pH 5.0 PROTEIN

 30 GLUCOSE NEGATIVE mg/dLKETONE NEGATIVE BILIRUBIN NEGATIVE BLOOD LARGE NITRITE
 NEGATIVE LEUK SCREEN MODERATE MICRO INDICATED? SEE BELOW WBC/HPF  RBC/HPF
 20-50 CASTS/LPF NEGATIVE /LPFCRYSTALS NEGATIVE MUCOUS THRDS NEGATIVE BACTERIA 
NEGATIVE EPITH CELLS FEW SQUAMOUS /HPFTRICHOMONAS NEGATIVE YEAST NEGATIVE CULT 
SET UP? YES 

 

                          1/3/2017 4:08 PM          COLOR YELLOW APPEARANCE HAZY SPEC GRAV 1.015 pH 6.0 PROTEIN 30

 GLUCOSE NEGATIVE mg/dLKETONE NEGATIVE BILIRUBIN NEGATIVE BLOOD MODERATE NITRITE
 NEGATIVE [place]UK SCREEN LARGE MICRO INDICATED? SEE BELOW WBC/-200 RBC/HPF 
5-10 CASTS/LPF NEGATIVE /LPFCRYSTALS NEGATIVE MUCOUS THRDS NEGATIVE BACTERIA 
NEGATIVE EPITH CELLS 1+ SQUAMOUS /HPFTRICHOMONAS NEGATIVE YEAST NEGATIVE CULT 
SET UP? YES 

 

                          2017 4:24 PM         CREATININE 1.50 mg/dLAGE 66 GFR NonAA 35 GFR AA 42 eGFR 35 eGFR

 AA* 42 VITAMIN B12 1324.0 pg/mL

 

                          2017 11:30 AM         GLUCOSE 93.0 mg/dLSODIUM 143.0 mmol/LPOTASSIUM 3.10 mmol/LCHLORIDE

 101.0 mmol/LCO2 29.0 mmol/LBUN 26.0 mg/dLCREATININE 1.50 mg/dLSGOT/AST 23.0 
IU/LSGPT/ALT 13.0 IU/LALK PHOS 66.0 IU/LTOTAL PROTEIN 7.70 g/dLALBUMIN 4.30 
g/dLTOTAL BILI 0.40 mg/dLCALCIUM 10.30 mg/dLAGE 66 GFR NonAA 35 GFR AA 42 eGFR 
35 eGFR AA* 42 TRIGLYCERIDES 147.0 mg/dLCHOLESTEROL 184.0 mg/dLHDL 44.0 mg/dLTOT
 CHOL/HDL 4.2 .0 mg/dLWBC 5.4 RBC 3.98 HGB 12.90 g/dLHCT 40.20 %.0
 fLMCH 32.40 pgMCHC 32.10 g/dLRDW SD 50 RDW CV 13.50 %MPV 9.30 fLPLT 210 NRBC# 
0.00 NRBC% 0.0 %NEUT 54.20 %%LYMP 30.70 %%MONO 9.10 %%EOS 5.20 %%BASO 0.40 
%#NEUT 2.94 #LYMP 1.66 #MONO 0.49 #EOS 0.28 #BASO 0.02 MANUAL DIFF NOT IND FREE 
T4 1.09 COLOR YELLOW APPEARANCE CLEAR SPEC GRAV <=1.005 pH 5.5 PROTEIN NEGATIVE 
GLUCOSE NEGATIVE mg/dLKETONE NEGATIVE BILIRUBIN NEGATIVE BLOOD NEGATIVE NITRITE 
NEGATIVE LEUK SCREEN LARGE MICRO INDICATED? SEE BELOW WBC/HPF 10-20 RBC/HPF 0-5 
CASTS/LPF NEGATIVE /LPFCRYSTALS NEGATIVE MUCOUS THRDS NEGATIVE BACTERIA FEW 
EPITH CELLS 1+ SQUAMOUS /HPFTRICHOMONAS NEGATIVE YEAST NEGATIVE CULT SET UP? YES
 TSH 1.820 uIU/mL

 

                          2017 2:45 PM         COLOR YELLOW APPEARANCE CLEAR SPEC GRAV <=1.005 pH 6.0 PROTEIN

 NEGATIVE GLUCOSE NEGATIVE mg/dLKETONE NEGATIVE BILIRUBIN NEGATIVE BLOOD TRACE-
INTACT NITRITE NEGATIVE LEUK SCREEN SMALL MICRO INDICATED? SEE BELOW WBC/HPF 0-5
 RBC/HPF NEGATIVE CASTS/LPF NEGATIVE /LPFCRYSTALS NEGATIVE MUCOUS THRDS NEGATIVE
 BACTERIA FEW EPITH CELLS FEW SQUAMOUS /HPFTRICHOMONAS NEGATIVE YEAST NEGATIVE 
CULT SET UP? YES 







History Of Immunizations







       Name    Date Admin    Mfg Name    Mfg Code    Trade Name    Lot#    Route    Inj    Vis Given    Vis

 Pub                                    CVX

 

        Influenza    2008    Not Entered    NE      Not Entered            Not Entered    Not Entered

                    1            999

 

        X       12/19/2008    [employer]    MSD     Pneumovax 23            Intramuscular    Not Entered

                    1            999

 

           Influenza    10/15/2010    [employer]    NOV        Fluvirin > 12 Years    

[id number]     Intramuscular    Left Deltoid    10/15/2010    2009    999

 

          X         3/23/2015    [facility]    [facility]       Prevnar 13    [id number]    Intramuscular

                Right Gluteous Medius    3/23/2015       2013       109







History of Past Illness







                    Name                Date of Onset       Comments

 

                    Peritoneal Neoplasm, Malignant                         

 

                    Hyperlipidemia                           

 

                    Bipolar disorder, unspecified                         

 

                    Artificial opening status; colostomy                         

 

                    B12 deficiency                           

 

                    Anemia, Pernicious                         

 

                    Arthritis unspecified                         

 

                    cervical cancer                          

 

                    Artificial opening status; colostomy    2010  1:10PM     

 

                    Bipolar disorder, unspecified    2010  1:10PM     

 

                    Hyperlipidemia      2010  1:10PM     

 

                    Anemia, Pernicious    2010  1:10PM     

 

                    Postoperative Follow-Up    2010  1:55PM     

 

                    Postoperative Follow-Up    Mar  8 2010 10:57AM     

 

                    Artificial opening status; colostomy    Mar  8 2010  1:19PM     

 

                    Peritoneal Neoplasm, Malignant    Mar  8 2010  1:19PM     

 

                    Artificial opening status; colostomy    2010  1:40PM     

 

                    Hyperlipidemia      2010  1:40PM     

 

                    Anemia, Pernicious    2010  1:40PM     

 

                    Peritoneal Neoplasm, Malignant    2010  1:40PM     

 

                    Arthritis unspecified    2010  1:40PM     

 

                    Anemia of Chronic Illness    2010  1:40PM     

 

                    Tinea corporis      2010  3:17PM     

 

                    Bipolar disorder, unspecified    2010  1:33PM     

 

                    Hyperlipidemia      2010  1:33PM     

 

                    Anemia, Pernicious    2010  1:33PM     

 

                    Peritoneal Neoplasm, Malignant    2010  1:33PM     

 

                    B12 deficiency      2010  1:33PM     

 

                    Ethmoidal Sinusitis, Acute    Sep 21 2010  3:53PM     

 

                    Wheezing            Sep 21 2010  3:53PM     

 

                    Flu                 Oct 15 2010  1:40PM     

 

                    Bipolar disorder, unspecified    Oct 15 2010  1:42PM     

 

                    Hyperlipidemia      Oct 15 2010  1:42PM     

 

                    Anemia, Pernicious    Oct 15 2010  1:42PM     

 

                    Peritoneal Neoplasm, Malignant    Oct 15 2010  1:42PM     

 

                    Bipolar disorder, unspecified    2011 12:01PM     

 

                    Hyperlipidemia      2011 12:01PM     

 

                    Anemia, Pernicious    2011 12:01PM     

 

                    Peritoneal Neoplasm, Malignant    2011 12:01PM     

 

                    Bipolar disorder, unspecified    Apr 15 2011 10:55AM     

 

                    Major Depression    2011 10:11AM     

 

                    Bipolar Disorder    2011 10:11AM     

 

                    Cancer              May 10 2011  4:16PM     

 

                    Major Depression    May 10 2011  3:16PM     

 

                    Bipolar Disorder    May 10 2011  3:16PM     

 

                    Hypercalcemia       May 23 2011  2:47PM     

 

                    Bipolar disorder, unspecified    May 23 2011  2:47PM     

 

                    Colon Cancer, Personal History    May 23 2011  2:47PM     

 

                    Bipolar Disorder    May 31 2011  4:39PM     

 

                    Depressive Disorder    2011 10:01AM     

 

                    Vitamin B12 deficiency    2011 10:01AM     

 

                    Vitamin D Deficiency    2011  5:07PM     

 

                    Anemia, Vitamin B12 Deficiency    2011  5:07PM     

 

                    B12 deficiency      2011  3:56PM     

 

                    Routine gynecological examination    Aug  4 2011  9:08AM     

 

                    Screening Examination for Breast Cancer    Aug  4 2011  9:08AM     

 

                    Tinea Corporis      Aug  4 2011  9:08AM     

 

                    Depressive Disorder    Sep 23 2011  8:47AM     

 

                    Contact Dermatitis    Sep 23 2011  8:47AM     

 

                    Anemia, Pernicious    Sep 23 2011  8:47AM     

 

                    B12 deficiency      Sep 23 2011  8:47AM     

 

                    B12 deficiency      Sep 27 2011  2:58PM     

 

                    B12 deficiency      Oct 20 2011  2:34PM     

 

                    Flu                 Dec  9 2011  3:16PM     

 

                    B12 deficiency      Dec  9 2011  3:17PM     

 

                    B12 deficiency      2012  4:52PM     

 

                    B12 deficiency      2012 11:10AM     

 

                    B12 deficiency      2012  3:37PM     

 

                    B12 deficiency      May  3 2012  4:10PM     

 

                    B12 deficiency      2012  2:54PM     

 

                    B12 deficiency      2012 11:23AM     

 

                    B12 deficiency      Aug  9 2012  2:08PM     

 

                    B12 deficiency      Sep  6 2012  4:36PM     

 

                    B12 deficiency      Oct 16 2012 10:23AM     

 

                    Flu                 2013  3:11PM     

 

                    Bipolar disorder, unspecified    2013  2:48PM     

 

                    Anemia, Pernicious    2013  2:48PM     

 

                    B12 deficiency      2013  2:48PM     

 

                    Extrapyramidal abnormal movement disorder    2013  2:48PM     

 

                    B12 deficiency      Apr  3 2013 12:03PM     

 

                    Bipolar disorder, unspecified    May  7 2013  1:31PM     

 

                    Anemia, Pernicious    May  7 2013  1:31PM     

 

                    B12 deficiency      May  7 2013  1:31PM     

 

                    Extrapyramidal abnormal movement disorder    May  7 2013  1:31PM     

 

                    B12 deficiency      2013  3:42PM     

 

                    B12 deficiency      2013  1:31PM     

 

                    Hyperlipidemia      Aug  7 2013 10:37AM     

 

                    Vitamin D Deficiency    Aug  7 2013 10:37AM     

 

                    Bipolar disorder, unspecified    Aug  7 2013 10:37AM     

 

                    Anemia, Pernicious    Aug  7 2013 10:37AM     

 

                    B12 deficiency      Aug  7 2013 10:37AM     

 

                    B12 deficiency      Sep 25 2013 11:15AM     

 

                    B12 deficiency      Dec 11 2013  3:16PM     

 

                    B12 deficiency      Mar  6 2014  1:48PM     

 

                    B12 deficiency      May 21 2014  3:17PM     

 

                    Screening Examination for Breast Cancer    2014  3:23PM     

 

                    Periumbilical abdominal pain    2014  3:23PM     

 

                    B12 deficiency      Jul 10 2014  2:52PM     

 

                    Anemia, Vitamin B12 Deficiency    Aug 13 2014  4:50PM     

 

                    Bipolar disorder    Oct 16 2014 11:13AM     

 

                    Hyperlipidemia      Oct 16 2014 11:13AM     

 

                    Anemia, Pernicious    Oct 16 2014 11:13AM     

 

                    Peritoneal Neoplasm, Malignant    Oct 16 2014 11:13AM     

 

                    Screening breast examination    Oct 16 2014 11:13AM     

 

                    Weight loss         Oct 16 2014 11:13AM     

 

                    Anemia, Pernicious    Mar 23 2015  2:57PM     

 

                    B12 deficiency      Mar 23 2015  2:57PM     

 

                    Need for Prevnar vaccine    Mar 23 2015  2:57PM     

 

                    Bipolar disorder    Mar 23 2015  2:57PM     

 

                    Hyperlipidemia      Mar 23 2015  2:57PM     

 

                    Anemia, Pernicious    Mar 23 2015  2:57PM     

 

                    Peritoneal Neoplasm, Malignant    Mar 23 2015  2:57PM     

 

                    B12 deficiency      May  4 2015  4:48PM     

 

                    Hyperlipidemia      May 13 2015  9:56AM     

 

                    Anemia              May 13 2015  9:56AM     

 

                    Bipolar disorder    May 13 2015  9:56AM     

 

                    Bipolar disorder    May 14 2015  3:27PM     

 

                    Hyperlipidemia      May 14 2015  3:27PM     

 

                    Anemia, Pernicious    May 14 2015  3:27PM     

 

                    Peritoneal Neoplasm, Malignant    May 14 2015  3:27PM     

 

                    B12 deficiency      2015  2:20PM     

 

                    B12 deficiency      2015 11:34AM     

 

                    B12 deficiency      Aug 18 2015  9:06AM     

 

                    Tinea Corporis      Sep 18 2015  8:54AM     

 

                    B12 deficiency      Sep 18 2015  8:54AM     

 

                    B12 deficiency      2015 10:28AM     

 

                    Herpes zoster without complication    Dec  3 2015  9:52AM     

 

                    B12 deficiency      Dec 23 2015 11:21AM     

 

                    B12 deficiency      2016  4:51PM     

 

                    Vitamin B 12 deficiency    Mar 14 2016  5:35PM     

 

                    B12 deficiency      Mar 15 2016 12:14PM     

 

                    B12 deficiency      May  5 2016 11:30AM     

 

                    Edema               May  5 2016 11:30AM     

 

                    Dermatitis          May  5 2016 11:30AM     

 

                    Edema               May 17 2016  8:38AM     

 

                    Shortness of breath    May 17 2016  8:38AM     

 

                    Bilateral edema of lower extremity    2016  2:06PM     

 

                    B12 deficiency      2016  2:06PM     

 

                    B12 deficiency      2016 11:50AM     

 

                    B12 deficiency      2016 11:20AM     

 

                    Diarrhea            Aug  2 2016  3:13PM     

 

                    B12 deficiency      Aug 24 2016 11:10AM     

 

                    Encounter for screening mammogram for breast cancer    Aug 24 2016 11:44AM     

 

                    B12 deficiency      Sep 28 2016  2:35PM     

 

                    B12 deficiency      Dec 15 2016  2:02PM     

 

                    Dysuria             Dec 29 2016 12:14PM     

 

                    Hematuria           Fidencio  3 2017  1:33PM     

 

                    Constipation by delayed colonic transit    2017  1:52PM     

 

                    Ileus               2017  1:52PM     

 

                    UTI (urinary tract infection)    Fidencio 15 2017  3:39PM     

 

                    Acute cystitis with hematuria    2017 11:07AM     

 

                    B12 deficiency      2017 11:07AM     

 

                    B12 deficiency      2017 11:40AM     

 

                    B12 deficiency      2017  4:07PM     

 

                    Slurred speech      2017  3:07PM     

 

                    Vitamin B12 deficiency    2017  3:07PM     

 

                    Dysphagia, unspecified type    2017  3:07PM     

 

                    Hyperlipidemia      2017  3:07PM     

 

                    Dysuria             2017 12:01PM     

 

                    B12 deficiency      2017  2:08PM     







Payers







           Insurance Name    Company Name    Plan Name    Plan Number    Policy Number    Policy Group

 Number                                 Start Date

 

                    Medicare RHC Medicare RHC              [id number]              N/A

 

                          [employer]    [employer]                 [id number]

                                                    

 

                    Medicare Part A    Medicare - Lab/Xray              [id number]              2006

 

                    Medicare Part B    [employer] [place]              [id number]              2006

 

                          [facility]    [employer]                 50 percent

                                                    2009

 

                    [facility]              [id number]              N/A

 

                    Medicare Part A    Medicare Part A              [id number]              N/A

 

                    Medicare Part A    Medicare Part A              [id number]              2006









History of Encounters







                    Visit Date          Visit Type          Provider

 

                    2017           Nurse visit         Bhupinder Aspen DO

 

                    2017           Office visit         

 

                    2017           Office visit        Bhupinder Aspen DO

 

                    2017           Nurse visit         Bhupinder Aspen DO

 

                    2017           Office visit        Radha Ontiveros APRN

 

                    1/15/2017           Office visit        Aj Tapia NP

 

                    2017            Office visit        Devin Masterson MD

 

                    2016          [facility]            Devin Masterson MD

 

                    12/15/2016          Nurse visit         Bhupinder Aspen DO

 

                    2016           Nurse visit         Bret Forte APRN

 

                    2016           Nurse visit         Bhupinder Aspen DO

 

                    2016            Office visit        Bhupinder Aspen DO

 

                    2016           Nurse visit         Bhupinder Aspen DO

 

                    2016           Office visit        Bret Forte APRN

 

                    2016            Office visit        Bhupinder Aspen DO

 

                    3/15/2016           Nurse visit         Bhupinder Aspen DO

 

                    2016            Nurse visit         Bhupinder Aspen DO

 

                    2015          Nurse visit         Bhupinder Aspen DO

 

                    12/3/2015           Office visit        Bhupinder Aspen DO

 

                    2015          Nurse visit         Bhupinder Aspen DO

 

                    2015           Office visit        Bhupinder Aspen DO

 

                    2015           Nurse visit         Bhupinder Aspen DO

 

                    2015            Nurse visit         Bhupinder Aspen DO

 

                    2015            Nurse visit         Bhupinder Aspen DO

 

                    2015           Office visit        Bhupidner Aspen DO

 

                    2015            Nurse visit         Bhupinder Aspen DO

 

                    3/23/2015           Office visit        Bhupinder Aspen DO

 

                    10/16/2014          Office visit        Bhupinder Aspen DO

 

                    2014           Nurse visit         Radha Ontiveros APRN

 

                    7/10/2014           Nurse visit         Bhupinder Aspen DO

 

                    2014           Office visit        Bhupinder Aspen DO

 

                    2014           Nurse visit         Bhupinder Aspen DO

 

                    3/6/2014            Nurse visit         Bhupinder Aspen DO

 

                    2014            [facility]            EARNEST Lopez MD

 

                    2013          Nurse visit         Bhupinder Aspen DO

 

                    2013           Nurse visit         Bhupinder Aspen DO

 

                    2013            Office visit        Bhupinder Aspen DO

 

                    2013            Nurse visit         Bhupinder Aspen DO

 

                    2013            Nurse visit         Bhupinder Aspen DO

 

                    2013            Office visit        Bhupinder Aspen DO

 

                    4/3/2013            Nurse visit         Bhupinder Aspen DO

 

                    2013            Office visit        Bhupinder Aspen DO

 

                    10/16/2012          Nurse visit         Bhupinder Aspen DO

 

                    2012            Nurse visit         Bhupinder Aspen DO

 

                    2012            Voided              Bhupinder Aspen DO

 

                    2012            Nurse visit         Bhupinder Aspen DO

 

                    2012            Nurse visit         Bhupinder Aspen DO

 

                    2012           Nurse visit         Bhupinder Aspen DO

 

                    5/3/2012            Nurse visit         Bhupinder Aspen DO

 

                    2012           Nurse visit         Bhupinder Aspen DO

 

                    2012           Nurse visit         Bhupinder Aspen DO

 

                    2012           Nurse visit         Bhupinder Aspen DO

 

                    2011           Nurse visit         Bhupinder Aspen DO

 

                    10/20/2011          Nurse visit         Bhupinder Aspen DO

 

                    2011           Office visit        Bhupinder Aspen DO

 

                    2011           Nurse visit         Radha GREEN

 

                    2011            Office visit        Bhupinder Aspen DO

 

                    2011           Nurse visit         Bhupinder Aspen DO

 

                    2011            Office visit        Bhupinder Aspen DO

 

                    2011           Office visit        Bhupinder Aspen DO

 

                    5/10/2011           Office visit        Bhupinder Aspen DO

 

                    2011           Office visit        Bhupinder Aspen DO

 

                    4/15/2011           Office visit        Devin Angel DO

 

                    2011           Office visit        Devin Angel DO

 

                    10/15/2010          Office visit        Devin Angel DO

 

                    2010           Office visit        Devin Angel DO

 

                    2010            Office visit        Devin Angel DO

 

                    2010           Office visit        Devin Angel DO

 

                    2010            Office visit        Devin Angel DO

 

                    3/8/2010            Office visit        Devin Masterson MD

 

                    2010            Surgery             Devin Masterson MD

 

                    2010            Office visit        Devin Angel DO

 

                    2010           Surgery             Devin Masterson MD

 

                    2010           Hospital            Devin Masterson MD

 

                    2010           [facility]            Devin Masterson MD

 

                    10/22/2009          Office visit        Devin Angel DO

## 2019-06-26 NOTE — XMS REPORT
MU2 Ambulatory Summary

                             Created on: 2015



Pauline Gan

External Reference #: [id number]

: 1950

Sex: Female



Demographics







                          Address                   [address]

[place]  [address]

 

                          Home Phone                [phone]

 

                          Preferred Language        English

 

                          Marital Status            Legally 

 

                          [employer] Affiliation     Unknown

 

                          Race                      White

 

                          Ethnic Group              Not  or 





Author







                          Author                    Bhupinder Louise

 

                          [facility]

 

                          Address                   [address]

[place]  [id number]



 

                          Phone                     [phone]







Care Team Providers







                    Care Team Member Name    Role                Phone

 

                    Bhupinder Louise    PCP                 Unavailable







Allergies and Adverse Reactions







                    Name                Reaction            Notes

 

                    NO KNOWN DRUG ALLERGIES                         







Plan of Treatment







             Planned Activity    Comments     Planned Date    Planned Time    Plan/Goal

 

             COMPLETE CBC W/AUTO DIFF WBC                 2013     12:00 AM      

 

             ASSAY OF LITHIUM                 2013     12:00 AM      

 

             METABOLIC PANEL TOTAL CA                 2013     12:00 AM      

 

             VITAMIN B-12                 2013     12:00 AM      

 

             ASSAY OF FOLIC ACID SERUM                 2013     12:00 AM      

 

             COMPLETE CBC W/AUTO DIFF WBC                 2015    12:00 AM      

 

             COMPREHEN METABOLIC PANEL                 2015    12:00 AM      

 

             LIPID PANEL                 2015    12:00 AM      

 

             ASSAY OF LITHIUM                 2015    12:00 AM      







Medications







                                        Active 

 

             Name         Start Date    Estimated Completion Date    SIG          Comments

 

                          syringe with needle (disp) Misc.(Non-Drug; Combo Route) Syringe 1 mL 25 X 1"    2011

                                        use for injection once a month     

 

                hydroxyzine HCl oral tablet 50 mg    10/16/2014                      take 1 tablet (50 mg) by oral 

route at bedtime                         

 

                Latuda oral tablet 20 mg                                    take 1 tablet (20 mg) by oral route once daily with

 food (at least 350 calories)            

 

             pravastatin oral tablet 40 mg    3/30/2015                 TAKE 1 TABLET BY MOUTH DAILY     

 

                lithium carbonate Oral capsule 300 mg    2015       take 1 capsule (300

 mg) by oral route 2 for 30 days         









                                         

 

             Name         Start Date    Expiration Date    SIG          Comments

 

             Reglan 10mg    3/29/2010    2010    one ac and hs     

 

                Keflex Oral Capsule 500 mg    2010       10/1/2010       take 1 capsule (500 mg) by oral

 route every 6 hours for 10 days         

 

                Bactrim DS Oral Tablet 800-160 mg    2011       take 1 tablet by oral route

 every 12 hours for 7 days               

 

                triamcinolone acetonide Topical Cream 0.1 %    2011      apply a thin

 layer to the affected area(s) by topical route 2 times per day     

 

                sertraline Oral tablet 100 mg    4/10/2012       5/10/2012       take 1.5 tablets by oral route

 daily for 30 days                       

 

                ergocalciferol (vitamin D2) Oral capsule 50,000 unit    4/15/2013       2013       TAKE

 ONE CAPSULE BY MOUTH ONCE A WEEK        

 

                CYANOCOBALAM 1000MCGINJ 1000 milliliter    2013       INJECT 1ML INTRAMUSCULAR

 ONCE A MONTH                            

 

                pravastatin Oral tablet 40 mg    3/25/2014       3/20/2015       TAKE ONE TABLET BY MOUTH EVERY

 DAY                                     

 

                          Zostavax (PF) subcutaneous suspension for reconstitution 19,400 unit/0.65 mL    3/23/2015

                    3/24/2015           inject 0.65 milliliter by subcutaneous route once     









                                        Discontinued 

 

             Name         Start Date    Discontinued Date    SIG          Comments

 

                Tylenol Oral Tablet 325 mg                    2013        take 1 - 2 tablets (325 -650 mg) by oral

 route every 4-6 hours as needed         

 

                Calcium 600 + D(3) Oral Tablet 600-400 mg-unit                    2011       take 1 tablet by oral

 route 2 times a day                    no longer taking

 

                Vitamin B-12 Oral Tablet Sustained Release 1,000 mcg    2010       take 

1 tablet by oral route daily            no longer taking

 

                Antifungal (Clotrimazole) Topical Cream 1 %    2010       apply to the 

affected and surrounding areas of skin by topical route 2 times per day morning 
and evening                              

 

                sertraline Oral Tablet 100 mg    5/10/2011       2011       take 2 tablets (200 mg) by 

oral route once daily                   discontinued by Dr. Serrano

 

                mirtazapine Oral Tablet 15 mg                    2011        take 1 tablet (15 mg) by oral route 

once daily before bedtime               Dr. Serrano

 

                mirtazapine Oral Tablet 15 mg                    2011        take 1 tablet (15 mg) by oral route 

once daily before bedtime               dc'd by Dr. Zach Harp Oral Tablet Extended Release 24 hr 50 mg                    2013        take 1 tablet (50

 mg) by oral route once daily           Dr. Zach Stoutiq Oral Tablet Extended Release 24 hr 50 mg                    2013        take 1 tablet (50

 mg) by oral route once daily           dose updated

 

                Vitamin B-12 Injection Solution 1,000 mcg/mL    2011        inject 1 milliliter

 (1,000 mcg) by intramuscular route once a month    on list already

 

                clotrimazole Topical Cream 1 %    2011        apply to the affected and surrounding

 areas of skin by topical route 2 times per day in the morning and evening     

 

                Vitamin D Oral Capsule 50,000 unit    2011        take 1 capsule (50,000 

unit) by oral route once weekly         generic on list

 

                Pravachol Oral Tablet 40 mg    2012        take 1 tablet (40 mg) by oral 

route once daily for 90 days            generic on list

 

                lithium carbonate Oral Capsule 300 mg    2012        take 1 capsule by oral

 route daily                            dose updated

 

                Pristiq Oral tablet extended release 24 hr 100 mg                    4/10/2012       take 1 and 1/2 

tablet (150 mg) by oral route once daily    [facility] provider

 

                Pristiq Oral tablet extended release 24 hr 100 mg                    4/10/2012       take 1 and 1/2 

tablet (150 mg) by oral route once daily    [facility]







Problem List







                    Description         Status              Onset

 

                    Artificial opening status; colostomy    Active               

 

                    Bipolar disorder, unspecified    Active               

 

                    Hyperlipidemia      Active               

 

                    Peritoneal Neoplasm, Malignant    Active               

 

                    Anemia, Pernicious    Active               

 

                    Arthritis unspecified    Active               

 

                    B12 deficiency      Active               







Vital Signs







      Date    Time    BP-Sys(mm[Hg]    BP-Lynn(mm[Hg])    HR(bpm)    RR(rpm)    Temp    WT    HT    HC    BMI

                    BSA                 BMI Percentile      O2 Sat(%)

 

       3/23/2015    2:55:00 PM    130 mmHg    76 mmHg    68 bpm    18 rpm    97 F    140 lbs    69 in    

                20.67 kg/m2     1.76 m2                         98 %

 

        10/16/2014    11:11:00 AM    120 mmHg    66 mmHg    77 bpm    20 rpm    98 F    130 lbs    69 in

                          19.1974 kg/m    1.6943 m                 100 %

 

        2014    3:21:00 PM    130 mmHg    66 mmHg    63 bpm    18 rpm    97.2 F    160 lbs    69 in

                          23.63 kg/m2    1.88 m2                   99 %

 

        2013    10:35:00 AM    132 mmHg    70 mmHg    66 bpm    20 rpm    98.1 F    157 lbs    69 in

                          23.1846 kg/m    1.862 m                  

 

        2013    1:29:00 PM    132 mmHg    70 mmHg    76 bpm    18 rpm    98.2 F    166 lbs    69 in 

                          24.51 kg/m2    1.91 m2                    

 

       2013    2:46:00 PM    128 mmHg    70 mmHg    76 bpm    16 rpm    98 F    160 lbs    69 in     

                23.6276 kg/m    1.8797 m                       

 

        2011    8:49:00 AM    128 mmHg    78 mmHg    70 bpm    18 rpm    97.9 F    164 lbs    69 in

                          24.22 kg/m2    1.90 m2                    

 

     2011    1:31:00 PM    132 mmHg    68 mmHg    84 bpm         97 F    167 lbs                        

                                         

 

        2011    9:09:00 AM    128 mmHg    70 mmHg    72 bpm    18 rpm    98.2 F    163 lbs    64 in 

                          27.98 kg/m2    1.83 m2                    

 

       2011    10:01:00 AM    132 mmHg    70 mmHg    72 bpm    18 rpm    98.2 F    154 lbs             

                                                                 

 

       2011    2:47:00 PM    128 mmHg    70 mmHg    72 bpm    18 rpm    97.8 F    156 lbs             

                                                                 

 

       5/10/2011    3:16:00 PM    144 mmHg    80 mmHg    72 bpm    18 rpm    98.2 F    158 lbs             

                                                                 

 

        2011    10:11:00 AM    132 mmHg    70 mmHg    70 bpm    18 rpm    98.2 F    168 lbs    69 in

                          24.81 kg/m2    1.93 m2                    

 

        4/15/2011    10:52:00 AM    110 mmHg    60 mmHg    75 bpm    16 rpm    97.5 F    172.375 lbs    

69 in                     25.4551 kg/m    1.951 m                 100 %

 

        2011    11:43:00 AM    120 mmHg    82 mmHg    75 bpm    16 rpm    97.2 F    178.5 lbs    69

 in                       26.36 kg/m2    1.99 m2                   100 %

 

        10/15/2010    1:32:00 PM    120 mmHg    70 mmHg    80 bpm    18 rpm    96.6 F    177 lbs    69 in

                          26.1381 kg/m    1.977 m                 100 %

 

        2010    3:50:00 PM    168 mmHg    100 mmHg    82 bpm    18 rpm    97.8 F    177.5 lbs    69

 in                       26.21 kg/m2    1.98 m2                   97 %

 

        2010    1:21:00 PM    140 mmHg    80 mmHg    59 bpm    16 rpm    97.6 F    173.25 lbs    69 

in                        25.5843 kg/m    1.956 m                 100 %

 

        2010    3:02:00 PM    140 mmHg    80 mmHg    61 bpm    16 rpm    97.6 F    173.125 lbs    69

 in                       25.57 kg/m2    1.96 m2                   99 %

 

        2010    1:23:00 PM    130 mmHg    80 mmHg    66 bpm    16 rpm    96.8 F    173 lbs    69 in 

                          25.5474 kg/m    1.9546 m                 100 %

 

        2010    12:58:00 PM    130 mmHg    88 mmHg    75 bpm    16 rpm    98.4 F    172.25 lbs    69

 in                       25.44 kg/m2    1.95 m2                   100 %







Social History







                    Name                Description         Comments

 

                    denies alcohol use                         

 

                    denies smoking                           

 

                    Denies illicit substance abuse                         

 

                    retired                                 direct care

 

                    Single                                   

 

                    Exercises regularly                         

 

                    Attended some college                         







History of Procedures







                    Date Ordered        Description         Order Status

 

                    2010 12:00 AM    COMPREHEN METABOLIC PANEL    Reviewed

 

                    2010 12:00 AM    COMPLETE CBC W/AUTO DIFF WBC    Reviewed

 

                    2010 12:00 AM    LIPID PANEL         Reviewed

 

                    2011 12:00 AM    MAMMOGRAM SCREENING    Reviewed

 

                    2011 12:00 AM    CYTOPATH C/V THIN LAYER    Reviewed

 

                    2011 12:00 AM    THER/PROPH/DIAG INJ SC/IM    Reviewed

 

                    10/20/2011 12:00 AM    THER/PROPH/DIAG INJ SC/IM    Reviewed

 

                    2011 12:00 AM    THER/PROPH/DIAG INJ SC/IM    Reviewed

 

                    2012 12:00 AM    THER/PROPH/DIAG INJ SC/IM    Reviewed

 

                    2012 12:00 AM    THER/PROPH/DIAG INJ SC/IM    Reviewed

 

                    5/3/2012 12:00 AM    THER/PROPH/DIAG INJ SC/IM    Reviewed

 

                    2012 12:00 AM    IMMUNOTHERAPY INJECTIONS    Reviewed

 

                    2012 12:00 AM    THER/PROPH/DIAG INJ SC/IM    Reviewed

 

                    2012 12:00 AM    THER/PROPH/DIAG INJ SC/IM    Reviewed

 

                    2012 12:00 AM    THER/PROPH/DIAG INJ SC/IM    Reviewed

 

                    10/16/2012 12:00 AM    THER/PROPH/DIAG INJ SC/IM    Reviewed

 

                    2010 12:00 AM    COMPREHEN METABOLIC PANEL    Reviewed

 

                    2010 12:00 AM    COMPLETE CBC W/AUTO DIFF WBC    Reviewed

 

                    2010 12:00 AM    LIPID PANEL         Reviewed

 

                    2013 12:00 AM    COMPLETE CBC W/AUTO DIFF WBC    Reviewed

 

                    2013 12:00 AM    ASSAY OF LITHIUM    Reviewed

 

                    2013 12:00 AM    METABOLIC PANEL TOTAL CA    Reviewed

 

                    4/3/2013 12:00 AM    THER/PROPH/DIAG INJ SC/IM    Reviewed

 

                    2013 12:00 AM    THER/PROPH/DIAG INJ SC/IM    Reviewed

 

                    2013 12:00 AM    THER/PROPH/DIAG INJ SC/IM    Reviewed

 

                    2013 12:00 AM    LIPID PANEL         Reviewed

 

                    2013 12:00 AM    VITAMIN D 25 HYDROXY    Reviewed

 

                    2013 12:00 AM    THER/PROPH/DIAG INJ SC/IM    Reviewed

 

                    3/6/2014 12:00 AM    THER/PROPH/DIAG INJ SC/IM    Reviewed

 

                    2014 12:00 AM    THER/PROPH/DIAG INJ SC/IM    Reviewed

 

                    2010 12:00 AM    SKIN FUNGI CULTURE    Reviewed

 

                    10/9/2010 12:00 AM    COMPREHEN METABOLIC PANEL    Reviewed

 

                    10/9/2010 12:00 AM    LIPID PANEL         Reviewed

 

                    2010 12:00 AM    THER/PROPH/DIAG INJ SC/IM    Reviewed

 

                    2010 12:00 AM    THER/PROPH/DIAG INJ SC/IM    Reviewed

 

                    10/15/2010 12:00 AM    FLU VACCINE 3 YRS & > IM    Reviewed

 

                    1/15/2011 12:00 AM    COMPLETE CBC W/AUTO DIFF WBC    Reviewed

 

                    1/15/2011 12:00 AM    COMPREHEN METABOLIC PANEL    Reviewed

 

                    1/15/2011 12:00 AM    LIPID PANEL         Reviewed

 

                    2014 12:00 AM    MAMMOGRAM SCREENING    Reviewed

 

                    7/10/2014 12:00 AM    THER/PROPH/DIAG INJ SC/IM    Reviewed

 

                    2011 12:00 AM    COMPLETE CBC W/AUTO DIFF WBC    Reviewed

 

                    2011 12:00 AM    COMPREHEN METABOLIC PANEL    Reviewed

 

                    2011 12:00 AM    LIPID PANEL         Reviewed

 

                    10/19/2014 12:00 AM    MAMMOGRAM SCREENING    Reviewed

 

                    10/16/2014 12:00 AM    COMPLETE CBC W/AUTO DIFF WBC    Reviewed

 

                    10/16/2014 12:00 AM    COMPREHEN METABOLIC PANEL    Reviewed

 

                    10/16/2014 12:00 AM    IMMUNOASSAY TUMOR     Reviewed

 

                    10/16/2014 12:00 AM    LIPID PANEL         Reviewed

 

                    10/16/2014 12:00 AM    ASSAY OF LITHIUM    Reviewed

 

                    10/16/2014 12:00 AM    MAMMOGRAM SCREENING    Reviewed

 

                    2011 12:00 AM    ASSAY OF PARATHORMONE    Reviewed

 

                    2011 12:00 AM    VITAMIN D 25 HYDROXY    Reviewed

 

                    2011 12:00 AM    ASSAY OF LITHIUM    Reviewed

 

                    2011 12:00 AM    METABOLIC PANEL TOTAL CA    Reviewed

 

                    2011 12:00 AM    CT HEAD/BRAIN W/O & W/DYE    Reviewed

 

                    3/23/2015 12:00 AM    PNEUMOCOCCAL VACC 13 GLENDY IM    Reviewed

 

                    2011 12:00 AM    ASSAY OF LITHIUM    Reviewed

 

                    2015 12:00 AM    THER/PROPH/DIAG INJ SC/IM    Reviewed

 

                    2011 12:00 AM    VIT D 1 25-DIHYDROXY    Reviewed

 

                    2011 12:00 AM    VITAMIN B-12        Reviewed

 

                    2011 12:00 AM    THER/PROPH/DIAG INJ SC/IM    Reviewed







Results Summary







                          Data and Description      Results

 

                          2004 12:00 AM        Colonoscopy-Women and Men over 50 Normal 

 

                          2008 12:00 AM         Pap Smear Declined 

 

                          10/7/2009 12:00 AM        Cholest Cry Stone Ql .0 %LDLc SerPl-mCnc 123.0 mg/dLHDLc

 SerPl-mCnc 34.0 mg/dLTrigl SerPl-mCnc 190.0 mg/dLGlucose SerPl-mCnc 78.0 mg/dL

 

                          2009 12:00 AM        Mammogram -Women over 40 Normal HIV1+2 Ab Ser Ql no risk 

 

                          2010 8:47 AM         Dexa Bone Scan Refused Aspirin reccommended Contraindication 



 

                          2010 8:48 AM         Depression Done 

 

                          2010 12:00 AM         Foot Exam-Diabetic Done 

 

                          2010 12:00 AM         Cholest Cry Stone Ql .0 %LDLc SerPl-mCnc 126.0 mg/dLGlucose

 SerPl-mCnc 102.0 mg/dL

 

                          2010 8:45 AM          TRIGLYCERIDES 122.0 mg/dLCHOLESTEROL 186.0 mg/dLHDL 36.0 mg/dLLDL

 (CALC) 126.0 mg/dLGLUCOSE 102.0 mg/dLSODIUM 143.0 mmol/LPOTASSIUM 3.70 
mmol/LCHLORIDE 111.0 mmol/LCO2 23.0 mmol/LBUN 10.0 mg/dLCREATININE 0.80 
mg/dLSGOT/AST 12.0 IU/LSGPT/ALT 11.0 IU/LALK PHOS 65.0 IU/LTOTAL PROTEIN 7.20 
g/dLALBUMIN 3.90 g/dLTOTAL BILI 0.50 mg/dLCALCIUM 10.20 mg/dLeGFR >60 
mL/min/1.73 m2WBC 5.7 RBC 3.26 HGB 10.60 g/dLHCT 31.70 %MCV 97.0 fLMCH 32.50 
pgMCHC 33.40 g/dLRDW CV 13.30 %MPV 9.70 fLPLT 287 %NEUT 62.90 %%LYMP 21.80 
%%MONO 9.90 %%EOS 5.0 %%BASO 0.40 %#NEUT 3.56 #LYMP 1.23 #MONO 0.56 #EOS 0.28 
#BASO 0.02 

 

                          2010 12:00 AM        Glucose SerPl-mCnc 96.0 mg/dLCholest Cry Stone Ql .0 %LDLc

 SerPl-mCnc 146.0 mg/dL

 

                          2010 8:26 AM         TRIGLYCERIDES 106.0 mg/dLCHOLESTEROL 199.0 mg/dLHDL 32.0 mg/dLLDL

 (CALC) 146.0 mg/dLGLUCOSE 96.0 mg/dLSODIUM 143.0 mmol/LPOTASSIUM 4.0 
mmol/LCHLORIDE 113.0 mmol/LCO2 24.0 mmol/LBUN 13.0 mg/dLCREATININE 1.0 
mg/dLSGOT/AST 11.0 IU/LSGPT/ALT 6.0 IU/LALK PHOS 56.0 IU/LTOTAL PROTEIN 6.60 
g/dLALBUMIN 3.80 g/dLTOTAL BILI 0.50 mg/dLCALCIUM 9.30 mg/dLeGFR 57 

 

                          10/6/2010 12:00 AM        Cholest Cry Stone Ql .0 %LDLc SerPl-mCnc 111.0 mg/dLGlucose

 SerPl-mCnc 81.0 mg/dL

 

                          10/6/2010 2:45 PM         TRIGLYCERIDES 123.0 mg/dLCHOLESTEROL 178.0 mg/dLHDL 42.0 mg/dLLDL

 (CALC) 111.0 mg/dLGLUCOSE 81.0 mg/dLSODIUM 139.0 mmol/LPOTASSIUM 4.10 
mmol/LCHLORIDE 106.0 mmol/LCO2 24.0 mmol/LBUN 13.0 mg/dLCREATININE 0.90 
mg/dLSGOT/AST 13.0 IU/LSGPT/ALT 11.0 IU/LALK PHOS 61.0 IU/LTOTAL PROTEIN 7.10 
g/dLALBUMIN 3.90 g/dLTOTAL BILI 0.30 mg/dLCALCIUM 9.30 mg/dLeGFR >60 mL/min/1.73
 m2WBC 6.9 RBC 3.59 HGB 11.50 g/dLHCT 35.30 %MCV 98.0 fLMCH 32.0 pgMCHC 32.60 
g/dLRDW CV 12.90 %MPV 9.90 fLPLT 311 %NEUT 64.90 %%LYMP 22.50 %%MONO 7.20 %%EOS 
5.10 %%BASO 0.30 %#NEUT 4.45 #LYMP 1.54 #MONO 0.49 #EOS 0.35 #BASO 0.02 

 

                          2011 12:00 AM         Mammogram -Women over 40 Ordered 

 

                          2011 10:25 AM        TRIGLYCERIDES 111.0 mg/dLCHOLESTEROL 195.0 mg/dLHDL 43.0 mg/dLLDL

 (CALC) 130.0 mg/dLWBC 5.3 RBC 3.76 HGB 12.0 g/dLHCT 37.80 %.0 fLMCH 
31.90 pgMCHC 31.70 g/dLRDW CV 13.0 %MPV 9.70 fLPLT 259 %NEUT 69.0 %%LYMP 17.60 
%%MONO 8.30 %%EOS 4.70 %%BASO 0.40 %#NEUT 3.63 #LYMP 0.93 #MONO 0.44 #EOS 0.25 
#BASO 0.02 GLUCOSE 102.0 mg/dLSODIUM 146.0 mmol/LPOTASSIUM 4.20 mmol/LCHLORIDE 
113.0 mmol/LCO2 23.0 mmol/LBUN 15.0 mg/dLCREATININE 1.0 mg/dLSGOT/AST 12.0 
IU/LSGPT/ALT 17.0 IU/LALK PHOS 60.0 IU/LTOTAL PROTEIN 6.90 g/dLALBUMIN 4.20 
g/dLTOTAL BILI 0.40 mg/dLCALCIUM 9.70 mg/dLeGFR 57 

 

                          2011 11:49 AM        Cholest Cry Stone Ql .0 %LDLc SerPl-mCnc 130.0 mg/dLHDLc

 SerPl-mCnc 43.0 mg/dLTrigl SerPl-mCnc 111.0 mg/dLGlucose SerPl-mCnc 102.0 mg/dL

 

                          2011 11:52 AM        Pap Smear Declined 

 

                          2011 11:28 AM        Lithium 2.080 mmol/LGLUCOSE 102.0 mg/dLSODIUM 135.0 mmol/LPOTASSIUM

 3.90 mmol/LCHLORIDE 106.0 mmol/LCO2 21.0 mmol/LBUN 12.0 mg/dLCREATININE 1.30 
mg/dLCALCIUM 10.70 mg/dLeGFR 42 

 

                          2011 8:58 AM          Lithium 0.690 mmol/L

 

                          2011 2:38 PM         VITAMIN B12 3483.0 pg/mL

 

                          2013 3:35 PM          WBC 5.1 RBC 3.73 HGB 11.70 g/dLHCT 36.40 %MCV 98.0 fLMCH 31.40

 pgMCHC 32.10 g/dLRDW CV 13.10 %MPV 9.80 fLPLT 224 %NEUT 66.80 %%LYMP 19.10 
%%MONO 9.0 %%EOS 4.90 %%BASO 0.20 %#NEUT 3.42 #LYMP 0.98 #MONO 0.46 #EOS 0.25 
#BASO 0.01 GLUCOSE 88.0 mg/dLSODIUM 141.0 mmol/LPOTASSIUM 4.10 mmol/LCHLORIDE 
110.0 mmol/LCO2 22.0 mmol/LBUN 22.0 mg/dLCREATININE 1.10 mg/dLCALCIUM 9.80 
mg/dLeGFR 50 Lithium 0.760 mmol/L

 

                          2013 11:02 AM        TRIGLYCERIDES 106.0 mg/dLCHOLESTEROL 181.0 mg/dLHDL 46.0 mg/dLLDL

 (CALC) 114.0 mg/dLVITAMIN D 41.10 ng/mL

 

                          10/17/2014 10:10 AM       WBC 5.0 RBC 3.66 HGB 11.60 g/dLHCT 36.80 %.0 fLMCH 31.70

 pgMCHC 31.50 g/dLRDW CV 13.50 %MPV 10.10 fLPLT 209 %NEUT 69.20 %%LYMP 21.0 
%%MONO 6.40 %%EOS 3.20 %%BASO 0.20 %#NEUT 3.46 #LYMP 1.05 #MONO 0.32 #EOS 0.16 
#BASO 0.01 GLUCOSE 100.0 mg/dLSODIUM 148.0 mmol/LPOTASSIUM 3.90 mmol/LCHLORIDE 
114.0 mmol/LCO2 26.0 mmol/LBUN 12.0 mg/dLCREATININE 1.20 mg/dLSGOT/AST 9.0 
IU/LSGPT/ALT <6 IU/LALK PHOS 82.0 IU/LTOTAL PROTEIN 6.90 g/dLALBUMIN 4.0 
g/dLTOTAL BILI 0.40 mg/dLCALCIUM 10.50 mg/dLeGFR 45 TRIGLYCERIDES 96.0 
mg/dLCHOLESTEROL 155.0 mg/dLHDL 38.0 mg/dLLDL (CALC) 98.0 mg/dLLithium 0.850 
mmol/LCancer Antigen (CA) 125 8.30 U/mL







History Of Immunizations







       Name    Date Admin    Mfg Name    Mfg Code    Trade Name    Lot#    Route    Inj    Vis Given    Vis

 Pub                                    CVX

 

        Influenza    2008    Not Entered    NE      Not Entered            Not Entered    Not Entered

                    1            999

 

          Pneumococcal    2008    [employer]    MSD       Pneumovax 23              Intramuscular

                Not Entered     1        999

 

           Influenza    10/15/2010    [employer]rely.    NOV        Fluvirin > 12 Years    

[id number]     Intramuscular    Left Deltoid    10/15/2010    2009    999

 

          Pneumococcal    3/23/2015    [facility]    [facility]       Prevnar 13    [id number]    Intramuscular

                Right Gluteous Medius    3/23/2015       2013       109







History of Past Illness







                    Name                Date of Onset       Comments

 

                    Peritoneal Neoplasm, Malignant                         

 

                    Hyperlipidemia                           

 

                    Bipolar disorder, unspecified                         

 

                    Artificial opening status; colostomy                         

 

                    B12 deficiency                           

 

                    Anemia, Pernicious                         

 

                    Arthritis unspecified                         

 

                    cervical cancer                          

 

                    Artificial opening status; colostomy    2010  1:10PM     

 

                    Bipolar disorder, unspecified    2010  1:10PM     

 

                    Hyperlipidemia      2010  1:10PM     

 

                    Anemia, Pernicious    2010  1:10PM     

 

                    Postoperative Follow-Up    2010  1:55PM     

 

                    Postoperative Follow-Up    Mar  8 2010 10:57AM     

 

                    Artificial opening status; colostomy    Mar  8 2010  1:19PM     

 

                    Peritoneal Neoplasm, Malignant    Mar  8 2010  1:19PM     

 

                    Artificial opening status; colostomy    2010  1:40PM     

 

                    Hyperlipidemia      2010  1:40PM     

 

                    Anemia, Pernicious    2010  1:40PM     

 

                    Peritoneal Neoplasm, Malignant    2010  1:40PM     

 

                    Arthritis unspecified    2010  1:40PM     

 

                    Anemia of Chronic Illness    2010  1:40PM     

 

                    Tinea corporis      2010  3:17PM     

 

                    Bipolar disorder, unspecified    2010  1:33PM     

 

                    Hyperlipidemia      2010  1:33PM     

 

                    Anemia, Pernicious    2010  1:33PM     

 

                    Peritoneal Neoplasm, Malignant    2010  1:33PM     

 

                    B12 deficiency      2010  1:33PM     

 

                    Ethmoidal Sinusitis, Acute    Sep 21 2010  3:53PM     

 

                    Wheezing            Sep 21 2010  3:53PM     

 

                    Flu                 Oct 15 2010  1:40PM     

 

                    Bipolar disorder, unspecified    Oct 15 2010  1:42PM     

 

                    Hyperlipidemia      Oct 15 2010  1:42PM     

 

                    Anemia, Pernicious    Oct 15 2010  1:42PM     

 

                    Peritoneal Neoplasm, Malignant    Oct 15 2010  1:42PM     

 

                    Bipolar disorder, unspecified    2011 12:01PM     

 

                    Hyperlipidemia      2011 12:01PM     

 

                    Anemia, Pernicious    2011 12:01PM     

 

                    Peritoneal Neoplasm, Malignant    2011 12:01PM     

 

                    Bipolar disorder, unspecified    Apr 15 2011 10:55AM     

 

                    Major Depression    2011 10:11AM     

 

                    Bipolar Disorder    2011 10:11AM     

 

                    Cancer              May 10 2011  4:16PM     

 

                    Major Depression    May 10 2011  3:16PM     

 

                    Bipolar Disorder    May 10 2011  3:16PM     

 

                    Hypercalcemia       May 23 2011  2:47PM     

 

                    Bipolar disorder, unspecified    May 23 2011  2:47PM     

 

                    Colon Cancer, Personal History    May 23 2011  2:47PM     

 

                    Bipolar Disorder    May 31 2011  4:39PM     

 

                    Depressive Disorder    2011 10:01AM     

 

                    Vitamin B12 deficiency    2011 10:01AM     

 

                    Vitamin D Deficiency    2011  5:07PM     

 

                    Anemia, Vitamin B12 Deficiency    2011  5:07PM     

 

                    B12 deficiency      2011  3:56PM     

 

                    Routine gynecological examination    Aug  4 2011  9:08AM     

 

                    Screening Examination for Breast Cancer    Aug  4 2011  9:08AM     

 

                    Tinea Corporis      Aug  4 2011  9:08AM     

 

                    Depressive Disorder    Sep 23 2011  8:47AM     

 

                    Contact Dermatitis    Sep 23 2011  8:47AM     

 

                    Anemia, Pernicious    Sep 23 2011  8:47AM     

 

                    B12 deficiency      Sep 23 2011  8:47AM     

 

                    B12 deficiency      Sep 27 2011  2:58PM     

 

                    B12 deficiency      Oct 20 2011  2:34PM     

 

                    Flu                 Dec  9 2011  3:16PM     

 

                    B12 deficiency      Dec  9 2011  3:17PM     

 

                    B12 deficiency      2012  4:52PM     

 

                    B12 deficiency      Feb 2012 11:10AM     

 

                    B12 deficiency      2012  3:37PM     

 

                    B12 deficiency      May  3 2012  4:10PM     

 

                    B12 deficiency      2012  2:54PM     

 

                    B12 deficiency      2012 11:23AM     

 

                    B12 deficiency      Aug  9 2012  2:08PM     

 

                    B12 deficiency      Sep  6 2012  4:36PM     

 

                    B12 deficiency      Oct 16 2012 10:23AM     

 

                    Flu                 Feb  2013  3:11PM     

 

                    Bipolar disorder, unspecified    Feb  2013  2:48PM     

 

                    Anemia, Pernicious    Feb  2013  2:48PM     

 

                    B12 deficiency      Feb  2013  2:48PM     

 

                    Extrapyramidal abnormal movement disorder    Feb  2013  2:48PM     

 

                    B12 deficiency      Apr  3 2013 12:03PM     

 

                    Bipolar disorder, unspecified    May  7 2013  1:31PM     

 

                    Anemia, Pernicious    May  7 2013  1:31PM     

 

                    B12 deficiency      May  7 2013  1:31PM     

 

                    Extrapyramidal abnormal movement disorder    May  7 2013  1:31PM     

 

                    B12 deficiency      2013  3:42PM     

 

                    B12 deficiency      2013  1:31PM     

 

                    Hyperlipidemia      Aug  7 2013 10:37AM     

 

                    Vitamin D Deficiency    Aug  7 2013 10:37AM     

 

                    Bipolar disorder, unspecified    Aug  7 2013 10:37AM     

 

                    Anemia, Pernicious    Aug  7 2013 10:37AM     

 

                    B12 deficiency      Aug  7 2013 10:37AM     

 

                    B12 deficiency      Sep 25 2013 11:15AM     

 

                    B12 deficiency      Dec 11 2013  3:16PM     

 

                    B12 deficiency      Mar  6 2014  1:48PM     

 

                    B12 deficiency      May 21 2014  3:17PM     

 

                    Screening Examination for Breast Cancer    2014  3:23PM     

 

                    Periumbilical abdominal pain    2014  3:23PM     

 

                    B12 deficiency      Jul 10 2014  2:52PM     

 

                    Anemia, Vitamin B12 Deficiency    Aug 13 2014  4:50PM     

 

                    Bipolar disorder    Oct 16 2014 11:13AM     

 

                    Hyperlipidemia      Oct 16 2014 11:13AM     

 

                    Anemia, Pernicious    Oct 16 2014 11:13AM     

 

                    Peritoneal Neoplasm, Malignant    Oct 16 2014 11:13AM     

 

                    Screening breast examination    Oct 16 2014 11:13AM     

 

                    Weight loss         Oct 16 2014 11:13AM     

 

                    Anemia, Pernicious    Mar 23 2015  2:57PM     

 

                    B12 deficiency      Mar 23 2015  2:57PM     

 

                    Need for Prevnar vaccine    Mar 23 2015  2:57PM     

 

                    Bipolar disorder    Mar 23 2015  2:57PM     

 

                    Hyperlipidemia      Mar 23 2015  2:57PM     

 

                    Anemia, Pernicious    Mar 23 2015  2:57PM     

 

                    Peritoneal Neoplasm, Malignant    Mar 23 2015  2:57PM     

 

                    B12 deficiency      May  4 2015  4:48PM     

 

                    Hyperlipidemia      May 13 2015  9:56AM     

 

                    Anemia              May 13 2015  9:56AM     

 

                    Bipolar disorder    May 13 2015  9:56AM     







Payers







           Insurance Name    Company Name    Plan Name    Plan Number    Policy Number    Policy Group

 Number                                 Start Date

 

                    Medicare Part A    Medicare Part A              [id number]              N/A

 

                    [facility]              [id number]              N/A

 

                    Medicare Part B    [employer] [place]              [id number]              2006

 

                          [employer]    [employer]                 50 percent

                                                    2009







History of Encounters







                    Visit Date          Visit Type          Provider

 

                    2015            Nurse visit         Bhupinder Louise DO

 

                    3/23/2015           Office visit        Bhupinder Louise DO

 

                    10/16/2014          Office visit        Bhupinder Aspen DO

 

                    2014           Nurse visit         Radha GREEN

 

                    7/10/2014           Nurse visit         Bhupinder Aspen DO

 

                    2014           Office visit        Bhupinder Aspen DO

 

                    2014           Nurse visit         Bhupinder Aspen DO

 

                    3/6/2014            Nurse visit         Bhupinder Aspen DO

 

                    2014            [facility]            EARNEST Lopez MD

 

                    2013          Nurse visit         Bhupinder Aspen DO

 

                    2013           Nurse visit         Bhupinder Aspen DO

 

                    2013            Office visit        Bhupinder Aspen DO

 

                    2013            Nurse visit         Bhupinder Aspen DO

 

                    2013            Nurse visit         Bhupinder Aspen DO

 

                    2013            Office visit        Bhupinder Aspen DO

 

                    4/3/2013            Nurse visit         Bhupinder Aspen DO

 

                    2013            Office visit        Bhupinder Aspen DO

 

                    10/16/2012          Nurse visit         Bhupinder Aspen DO

 

                    2012            Voided              Bhupinder Nealte DO

 

                    2012            Nurse visit         Bhupinder Aspen DO

 

                    2012            Nurse visit         Bhupinder Aspen DO

 

                    2012            Nurse visit         Bhupinder Aspen DO

 

                    2012           Nurse visit         Bhupinder Aspen DO

 

                    5/3/2012            Nurse visit         Bhupinder Aspen DO

 

                    2012           Nurse visit         Bhupinder Aspen DO

 

                    2012           Nurse visit         Bhupinder Aspen DO

 

                    2012           Nurse visit         Bhupinder Aspen DO

 

                    2011           Nurse visit         Bhupinder Aspen DO

 

                    10/20/2011          Nurse visit         Bhupinder Aspen DO

 

                    2011           Office visit        Bhupinder Aspen DO

 

                    2011           Nurse visit         Radha Weston GREEN

 

                    2011            Office visit        Bhupinder Aspen DO

 

                    2011           Nurse visit         Bhupinder Aspen DO

 

                    2011            Office visit        Bhupinder Aspen DO

 

                    2011           Office visit        Bhupinder Aspen DO

 

                    5/10/2011           Office visit        Bhupinder Aspen DO

 

                    2011           Office visit        Bhupinder Aspen DO

 

                    4/15/2011           Office visit        Devin Angel DO

 

                    2011           Office visit        Devin Angel DO

 

                    10/15/2010          Office visit        Devin Angel DO

 

                    2010           Office visit        Devin Angel DO

 

                    2010            Office visit        Devin Angel DO

 

                    2010           Office visit        Devin Angel DO

 

                    2010            Office visit        Devin Angel DO

 

                    3/8/2010            Office visit        Devin Masterson MD

 

                    2010            Office visit        Devin Angel DO

 

                    2010            Surgery             Devin Masterson MD

 

                    2010           Surgery             Devin Masterson MD

 

                    2010           Hospital            Devin Masterson MD

 

                    2010           Hospital            Devin Masterson MD

 

                    10/22/2009          Office visit        Dvein Angel DO

## 2019-06-26 NOTE — XMS REPORT
MU2 Ambulatory Summary

                             Created on: 2017



Pauline Gan

External Reference #: [id number]

: 1950

Sex: Female



Demographics







                          Address                   [address]

[place]  [id number]

 

                          Home Phone                [phone]

 

                          Preferred Language        English

 

                          Marital Status            Legally 

 

                          Quaker Affiliation     Unknown

 

                          Race                      White

 

                          Ethnic Group              Not  or 





Author







                          Author                    Bhupinder Louise

 

                          [facility]

 

                          Address                   [address]

[place]  [id number]



 

                          Phone                     [phone]







Care Team Providers







                    Care Team Member Name    Role                Phone

 

                    Bhupinder Louise    PCP                 Unavailable

 

                    Bhupinder Louise    PreferredProvider    Unavailable







Allergies and Adverse Reactions







                    Name                Reaction            Notes

 

                    NO KNOWN DRUG ALLERGIES                         







Plan of Treatment







             Planned Activity    Comments     Planned Date    Planned Time    Plan/Goal

 

             Injection,Subcutaneous/Intramuscul, [facility]                 2017    12:00 AM      

 

             [facility]                       2017    12:00 AM      







Medications







                                        Active 

 

             Name         Start Date    Estimated Completion Date    SIG          Comments

 

                Latuda 20 mg oral tablet                                    take 1 tablet (20 mg) by oral route once daily with

 food (at least 350 calories)            

 

             pravastatin 40 mg oral tablet    3/30/2015                 TAKE 1 TABLET BY MOUTH DAILY     

 

                Namenda XR 28 mg oral capsule,sprinkle,ER 24hr    2015                       take 1 capsule (28

 mg) by oral route once daily            

 

                Namenda XR 28 mg oral capsule,sprinkle,ER 24hr    2016                       take 1 capsule (28

 mg) by oral route once daily            

 

                potassium chloride 10 mEq oral tablet extended release    2016                       take 1 tablet

 (10 meq) by oral route once daily       

 

             pravastatin 40 mg oral tablet    2016                 TAKE 1 TABLET BY MOUTH DAILY     

 

                Vitamin B-12 1,000 mcg/mL injection solution    2016                       inject 1 milliliter 

(1,000 mcg) by intramuscular route once a month     

 

                potassium chloride 10 mEq oral tablet extended release    2016                      take 1 tablet

 (10 meq) by oral route once daily       

 

                Namenda XR 28 mg oral capsule,sprinkle,ER 24hr    2016                      TAKE 1 CAPSULE BY

 MOUTH EVERY DAY                         

 

                furosemide 40 mg oral tablet    2016                      take 1 tablet (40 mg) by oral route

 once daily                              

 

                mirtazapine 45 mg oral tablet                                    take 1 tablet (45 mg) by oral route once daily

 before bedtime                          

 

             Fish Oil 300-1,000 mg oral capsule                              take 1 capsule by oral route daily     

 

             Vitamin D3 1,000 unit oral tablet                              take 1 tablet by oral route daily     

 

                Calcium 600 600 mg calcium (1,500 mg) oral tablet                                    take 1 tablet by oral route

 daily                                   

 

                Aspirin Low Dose 81 mg oral tablet,delayed release (DR/EC)                                    take 1 tablet 

(81 mg) by oral route once daily         

 

                metoclopramide HCl 10 mg oral tablet    2017                       take 1 tablet by oral route 

2 times a day for 50 days                

 

             furosemide 40 mg oral tablet    2017                 TAKE 1 TABLET BY MOUTH DAILY     

 

                Namenda XR 28 mg oral capsule,sprinkle,ER 24hr    2017                       TAKE 1 CAPSULE BY 

MOUTH EVERY DAY                          

 

                Linzess 72 mcg oral capsule    2017                       take 1 capsule (72 mcg) by oral route

 once daily on an empty stomach at least 30 minutes before 1st meal of the day     



 

             pravastatin 40 mg oral tablet    2017                 TAKE 1 TABLET BY MOUTH DAILY     

 

                potassium chloride 10 mEq oral tablet extended release    2017                       TAKE 2 TABLETs

 BY MOUTH twice DAILY for one week       

 

                cefuroxime axetil 500 mg oral tablet    2017        take 1 tablet (500 mg)

 by oral route 2 times per day for 10 days     

 

                metoclopramide HCl 10 mg oral tablet    2017                       TAKE 1 TABLET BY ORAL ROUTE 

2 TIMES A DAY FOR 50 DAYS                









                                         

 

             Name         Start Date    Expiration Date    SIG          Comments

 

             Reglan 10mg    3/29/2010    2010    one ac and hs     

 

                Keflex 500 mg oral capsule    2010       10/1/2010       take 1 capsule (500 mg) by oral

 route every 6 hours for 10 days         

 

                Bactrim -160 mg oral tablet    2011       take 1 tablet by oral route

 every 12 hours for 7 days               

 

                triamcinolone acetonide 0.1 % topical cream    2011      apply a thin

 layer to the affected area(s) by topical route 2 times per day     

 

                sertraline 100 mg oral tablet    4/10/2012       5/10/2012       take 1.5 tablets by oral route

 daily for 30 days                       

 

                ergocalciferol (vitamin D2) 50,000 unit oral capsule    4/15/2013       2013       TAKE

 ONE CAPSULE BY MOUTH ONCE A WEEK        

 

                CYANOCOBALAM 1000MCGINJ 1000 milliliter    2013       INJECT 1ML INTRAMUSCULAR

 ONCE A MONTH                            

 

                pravastatin 40 mg oral tablet    3/25/2014       3/20/2015       TAKE ONE TABLET BY MOUTH EVERY

 DAY                                     

 

                          Zostavax (PF) 19,400 unit/0.65 mL subcutaneous suspension for reconstitution    3/23/2015

                    3/24/2015           inject 0.65 milliliter by subcutaneous route once     

 

                famciclovir 500 mg oral tablet    12/3/2015       12/10/2015      take 1 tablet (500 mg) by

 oral route every 8 hours for 7 days     

 

                furosemide 40 mg oral tablet    2016      take 1 tablet (40 mg) by oral

 route once daily                        

 

                Cipro 500 mg oral tablet    2016       take 1 tablet (500 mg) by oral route

 2 times per day for 5 days              

 

                Bactrim -160 mg oral tablet    2016        take 1 tablet by oral route

 every 12 hours for 7 days               

 

                metoclopramide HCl 10 mg oral tablet    2017       take 1 tablet by oral

 route 2 times a day for 50 days         

 

                Macrobid 100 mg oral capsule    2017       take 1 capsule (100 mg) by oral

 route 2 times per day with food for 7 days     

 

                Augmentin 875-125 mg oral tablet    2017       take 1 tablet by oral route

 every 12 hours for 7 days               

 

                amoxicillin 500 mg oral tablet    2017       take 1 tablet (500 mg) by oral

 route every 12 hours for 7 days         

 

                ciprofloxacin HCl 500 mg oral tablet    2017       take 1 tablet (500 mg)

 by oral route every 12 hours for 5 days     









                                        Discontinued 

 

             Name         Start Date    Discontinued Date    SIG          Comments

 

                Tylenol 325 mg oral tablet                    2013        take 1 - 2 tablets (325 -650 mg) by oral

 route every 4-6 hours as needed         

 

                Calcium 600 + D(3) 600 mg(1,500mg) -400 unit oral tablet                    2011       take 1 tablet

 by oral route 2 times a day            no longer taking

 

                Vitamin B-12 1,000 mcg oral tablet extended release    2010       take 1

 tablet by oral route daily             no longer taking

 

                Antifungal (clotrimazole) 1 % topical cream    2010       apply to the 

affected and surrounding areas of skin by topical route 2 times per day morning 
and evening                              

 

                sertraline 100 mg oral tablet    5/10/2011       2011       take 2 tablets (200 mg) by 

oral route once daily                   discontinued by Dr. Serrano

 

                mirtazapine 15 mg oral tablet                    2011        take 1 tablet (15 mg) by oral route 

once daily before bedtime               Dr. Serrano

 

                mirtazapine 15 mg oral tablet                    2011        take 1 tablet (15 mg) by oral route 

once daily before bedtime               dc'd by Dr. Serrano

 

                Pristiq 50 mg oral tablet extended release 24 hr                    2013        take 1 tablet (50

 mg) by oral route once daily           Dr. Serrano

 

                Pristiq 50 mg oral tablet extended release 24 hr                    2013        take 1 tablet (50

 mg) by oral route once daily           dose updated

 

                Vitamin B-12 1,000 mcg/mL injection solution    2011        inject 1 milliliter

 (1,000 mcg) by intramuscular route once a month    on list already

 

                    syringe with needle 1 mL 25 gauge x 1" miscellaneous syringe    2011

                          use for injection once a month     

 

                clotrimazole 1 % topical cream    2011        apply to the affected and surrounding

 areas of skin by topical route 2 times per day in the morning and evening     

 

                Vitamin D2 50,000 unit oral capsule    2011        take 1 capsule (50,000

 unit) by oral route once weekly        generic on list

 

                Pravachol 40 mg oral tablet    2012        take 1 tablet (40 mg) by oral 

route once daily for 90 days            generic on list

 

                lithium carbonate 300 mg oral capsule    2012        take 1 capsule by oral

 route daily                            dose updated

 

                Pristiq 100 mg oral tablet extended release 24 hr                    4/10/2012       take 1 and 1/2 

tablet (150 mg) by oral route once daily    Mental Health provider

 

                Pristiq 100 mg oral tablet extended release 24 hr                    4/10/2012       take 1 and 1/2 

tablet (150 mg) by oral route once daily    Discontinued by Dr Efrain [facility] at [facility]

 

                hydroxyzine HCl 50 mg oral tablet    10/16/2014      2015       take 1 tablet (50 mg) 

by oral route at bedtime                 

 

                lithium carbonate 300 mg oral capsule    2015       take 1 capsule (300

 mg) by oral route 2 for 30 days         

 

                fluconazole 100 mg oral tablet    2015       12/3/2015       take 1 tablet (100 mg) by 

oral route once a week                   

 

                ketoconazole 2 % topical cream    2015       12/3/2015       apply to the affected area(s)

 by topical route 2 times per day        

 

                prednisone 10 mg oral tablet    12/3/2015       2016        take 2 tablets (20 mg) by oral

 route once daily for 4 days 1 tablet daily for 4 days 0.5 tablet daily for 4 
days                                     

 

                triamcinolone acetonide 0.1 % topical cream    2016       apply a thin layer

 to the affected area(s) by topical route 2 times per day     

 

                Cipro 500 mg oral tablet    1/15/2017       2017       take 1 tablet (500 mg) by oral route

 every 12 hours for 10 days              







Problem List







                    Description         Status              Onset

 

                    Artificial opening status; colostomy    Active               

 

                    Bipolar disorder, unspecified    Active               

 

                    Hyperlipidemia      Active               

 

                    Peritoneal Neoplasm, Malignant    Active               

 

                    Anemia, Pernicious    Active               

 

                    Arthritis unspecified    Active               

 

                    B12 deficiency      Active               







Vital Signs







      Date    Time    BP-Sys(mm[Hg]    BP-Lynn(mm[Hg])    HR(bpm)    RR(rpm)    Temp    WT    HT    HC    BMI

                    BSA                 BMI Percentile      O2 Sat(%)

 

        2017    1:34:00 PM    118 mmHg    62 mmHg    122 bpm    18 rpm    97.8 F    161.375 lbs    

69 in                     23.83 kg/m2    1.89 m2                   96 %

 

        2017    3:05:00 PM    134 mmHg    70 mmHg    70 bpm    20 rpm    97.4 F    181 lbs    69 in

                          26.7288 kg/m    1.9992 m                 98 %

 

        2017    11:07:00 AM    124 mmHg    64 mmHg    62 bpm    17 rpm    98.2 F    181.2 lbs    69

 in                       26.76 kg/m2    2.00 m2                   98 %

 

        1/15/2017    3:34:00 PM    148 mmHg    89 mmHg    69 bpm    20 rpm    98.2 F    179 lbs    69 in

                          26.4334 kg/m    1.9882 m                 98 %

 

       2017    1:51:00 PM    160 mmHg    90 mmHg    100 bpm    20 rpm    96.5 F    179 lbs             

                                                                98 %

 

       2016    3:11:00 PM    134 mmHg    76 mmHg    80 bpm    20 rpm    98 F    163 lbs    69 in     

                24.0706 kg/m    1.8972 m                      98 %

 

        2016    2:04:00 PM    142 mmHg    86 mmHg    68 bpm    16 rpm    98.5 F    166 lbs    63 in

                          29.41 kg/m2    1.83 m2                   100 %

 

        2016    11:27:00 AM    148 mmHg    78 mmHg    90 bpm    20 rpm    98.2 F    153 lbs    69 in

                          22.5939 kg/m    1.8381 m                 96 %

 

        12/3/2015    9:50:00 AM    132 mmHg    70 mmHg    62 bpm    16 rpm    97.9 F    145 lbs    69 in

                          21.41 kg/m2    1.79 m2                   100 %

 

        2015    8:52:00 AM    132 mmHg    68 mmHg    52 bpm    20 rpm    97.8 F    141 lbs    69 in

                          20.8218 kg/m    1.7645 m                 100 %

 

        2015    3:25:00 PM    120 mmHg    62 mmHg    72 bpm    16 rpm    98.1 F    136 lbs    69 in

                          20.08 kg/m2    1.73 m2                   98 %

 

       3/23/2015    2:55:00 PM    130 mmHg    76 mmHg    68 bpm    18 rpm    97 F    140 lbs    69 in    

                20.6742 kg/m    1.7583 m                      98 %

 

        10/16/2014    11:11:00 AM    120 mmHg    66 mmHg    77 bpm    20 rpm    98 F    130 lbs    69 in

                          19.20 kg/m2    1.69 m2                   100 %

 

        2014    3:21:00 PM    130 mmHg    66 mmHg    63 bpm    18 rpm    97.2 F    160 lbs    69 in

                          23.6276 kg/m    1.8797 m                 99 %

 

        2013    10:35:00 AM    132 mmHg    70 mmHg    66 bpm    20 rpm    98.1 F    157 lbs    69 in

                          23.18 kg/m2    1.86 m2                    

 

        2013    1:29:00 PM    132 mmHg    70 mmHg    76 bpm    18 rpm    98.2 F    166 lbs    69 in 

                          24.5137 kg/m    1.9146 m                  

 

       2013    2:46:00 PM    128 mmHg    70 mmHg    76 bpm    16 rpm    98 F    160 lbs    69 in     

                23.63 kg/m2     1.88 m2                          

 

        2011    8:49:00 AM    128 mmHg    78 mmHg    70 bpm    18 rpm    97.9 F    164 lbs    69 in

                          24.2183 kg/m    1.903 m                  

 

     2011    1:31:00 PM    132 mmHg    68 mmHg    84 bpm         97 F    167 lbs                        

                                         

 

        2011    9:09:00 AM    128 mmHg    70 mmHg    72 bpm    18 rpm    98.2 F    163 lbs    64 in 

                          27.9786 kg/m    1.8272 m                  

 

       2011    10:01:00 AM    132 mmHg    70 mmHg    72 bpm    18 rpm    98.2 F    154 lbs             

                                                                 

 

       2011    2:47:00 PM    128 mmHg    70 mmHg    72 bpm    18 rpm    97.8 F    156 lbs             

                                                                 

 

       5/10/2011    3:16:00 PM    144 mmHg    80 mmHg    72 bpm    18 rpm    98.2 F    158 lbs             

                                                                 

 

        2011    10:11:00 AM    132 mmHg    70 mmHg    70 bpm    18 rpm    98.2 F    168 lbs    69 in

                          24.809 kg/m    1.9261 m                  

 

        4/15/2011    10:52:00 AM    110 mmHg    60 mmHg    75 bpm    16 rpm    97.5 F    172.375 lbs    

69 in                     25.46 kg/m2    1.95 m2                   100 %

 

        2011    11:43:00 AM    120 mmHg    82 mmHg    75 bpm    16 rpm    97.2 F    178.5 lbs    69

 in                       26.3596 kg/m    1.9854 m                 100 %

 

        10/15/2010    1:32:00 PM    120 mmHg    70 mmHg    80 bpm    18 rpm    96.6 F    177 lbs    69 in

                          26.14 kg/m2    1.98 m2                   100 %

 

        2010    3:50:00 PM    168 mmHg    100 mmHg    82 bpm    18 rpm    97.8 F    177.5 lbs    69

 in                       26.2119 kg/m    1.9798 m                 97 %

 

        2010    1:21:00 PM    140 mmHg    80 mmHg    59 bpm    16 rpm    97.6 F    173.25 lbs    69 

in                        25.58 kg/m2    1.96 m2                   100 %

 

        2010    3:02:00 PM    140 mmHg    80 mmHg    61 bpm    16 rpm    97.6 F    173.125 lbs    69

 in                       25.5658 kg/m    1.9553 m                 99 %

 

        2010    1:23:00 PM    130 mmHg    80 mmHg    66 bpm    16 rpm    96.8 F    173 lbs    69 in 

                          25.55 kg/m2    1.95 m2                   100 %

 

        2010    12:58:00 PM    130 mmHg    88 mmHg    75 bpm    16 rpm    98.4 F    172.25 lbs    69

 in                       25.4366 kg/m    1.9503 m                 100 %







Social History







                    Name                Description         Comments

 

                    denies alcohol use                         

 

                    denies smoking                           

 

                    Denies illicit substance abuse                         

 

                    retired                                 direct care

 

                    Single                                   

 

                    Exercises regularly                         

 

                    Attended some college                         

 

                    Tobacco             Never smoker         







History of Procedures







                    Date Ordered        Description         Order Status

 

                    2010 12:00 AM    COMPREHEN METABOLIC PANEL    Reviewed

 

                    2010 12:00 AM    COMPLETE CBC W/AUTO DIFF WBC    Reviewed

 

                    2010 12:00 AM    LIPID PANEL         Reviewed

 

                          2015 12:00 AM        B12 Injection, Up to 1000 Mcg NDC#1053-9442-96 [facility] 

                                        Reviewed

 

                    2011 12:00 AM    MAMMOGRAM SCREENING    Reviewed

 

                    2011 12:00 AM    CYTOPATH C/V THIN LAYER    Reviewed

 

                    2011 12:00 AM    B12 Injection 1 cc NDC#[address]-38    Reviewed

 

                    2015 12:00 AM    THER/PROPH/DIAG INJ SC/IM    Reviewed

 

                    2015 12:00 AM    B12 Injection, Up to 1000 Mcg NDC#8001-7174-26    Reviewed

 

                    2011 12:00 AM    THER/PROPH/DIAG INJ SC/IM    Reviewed

 

                    2011 12:00 AM    B12 Injection([place]) Ndc#8871-6902-05-    Reviewed

 

                    2015 12:00 AM    THER/PROPH/DIAG INJ SC/IM    Reviewed

 

                    2015 12:00 AM    B12 Injection, Up to 1000 Mcg NDC#0024-1692-69    Reviewed

 

                    10/20/2011 12:00 AM    THER/PROPH/DIAG INJ SC/IM    Reviewed

 

                    10/20/2011 12:00 AM    B12 Injection([place]) Ndc#9978-5749-95-    Reviewed

 

                    2016 12:00 AM    THER/PROPH/DIAG INJ SC/IM    Reviewed

 

                    2016 12:00 AM    B12 Injection, Up to 1000 Mcg NDC#5458-9438-45    Reviewed

 

                    3/14/2016 12:00 AM    VITAMIN B-12        Reviewed

 

                    3/15/2016 12:00 AM    THER/PROPH/DIAG INJ SC/IM    Reviewed

 

                    3/15/2016 12:00 AM    B12 Injection, Up to 1000 Mcg NDC#0233-3336-23    Reviewed

 

                    2011 12:00 AM    ***Immunization administration, Medicare flu    Reviewed

 

                    2011 12:00 AM    Fluzone ** MEDICARE Only **    Reviewed

 

                    2011 12:00 AM    THER/PROPH/DIAG INJ SC/IM    Reviewed

 

                    2011 12:00 AM    B12 Injection ([employer] [facility]) Ndc#8915-4845-42    Reviewed

 

                    2016 12:00 AM    B12 Injection, Up to 1000 Mcg NDC#0480-7313-99 [facility]    

Reviewed

 

                    2016 12:00 AM    TTE W/DOPPLER COMPLETE    Reviewed

 

                    2016 12:00 AM    EXTREMITY STUDY     Reviewed

 

                          2016 12:00 AM        B12 Injection, Up to 1000 Mcg NDC#0084-2432-52 [facility] Medicare 

                                        Reviewed

 

                    2016 12:00 AM    THER/PROPH/DIAG INJ SC/IM    Reviewed

 

                    2016 12:00 AM    B12 Injection, Up to 1000 Mcg NDC#9335-6049-36    Reviewed

 

                    2016 12:00 AM    THER/PROPH/DIAG INJ SC/IM    Reviewed

 

                    2012 12:00 AM    B12 Injection([place]) Ndc#2335-8301-42-    Reviewed

 

                    2016 12:00 AM    B12 Injection, Up to 1000 Mcg NDC#2296-9755-48    Reviewed

 

                    2016 12:00 AM    THER/PROPH/DIAG INJ SC/IM    Reviewed

 

                    2012 12:00 AM    THER/PROPH/DIAG INJ SC/IM    Reviewed

 

                    2012 12:00 AM    B12 Injection ([employer]) Ndc#1459-0466-47    Reviewed

 

                    2016 12:00 AM    THER/PROPH/DIAG INJ SC/IM    Reviewed

 

                    2016 12:00 AM    B12 Injection, Up to 1000 Mcg NDC#3610-3229-65    Reviewed

 

                    2016 12:00 AM    B12 Injection, Up to 1000 Mcg NDC#1953-00[address]-81    Reviewed

 

                    2016 12:00 AM    THER/PROPH/DIAG INJ SC/IM    Reviewed

 

                    2012 12:00 AM    THER/PROPH/DIAG INJ SC/IM    Reviewed

 

                    2012 12:00 AM    B12 Injection([place]) Ndc#8745-0168-11-    Reviewed

 

                    12/15/2016 12:00 AM    B12 Injection, Up to 1000 Mcg NDC#9457-4088-30    Reviewed

 

                    12/15/2016 12:00 AM    THER/PROPH/DIAG INJ SC/IM    Reviewed

 

                    2016 12:00 AM    URNLS DIP STICK/TABLET RGNT AUTO W/O MICROSCOPY    Reviewed

 

                    1/3/2017 12:00 AM    URNLS DIP STICK/TABLET RGNT AUTO W/O MICROSCOPY    Reviewed

 

                    2017 12:00 AM    URINE CULTURE/COLONY COUNT    Reviewed

 

                    2017 12:00 AM    Rocephin 1 gram Injection, [facility]    Reviewed

 

                    2017 12:00 AM    THERAPEUTIC PROPHYLACTIC/DX INJECTION SUBQ/IM    Reviewed

 

                    2017 12:00 AM    B12 1000mcg Injection, [facility] Medicare    Reviewed

 

                    5/3/2012 12:00 AM    THER/PROPH/DIAG INJ SC/IM    Reviewed

 

                    5/3/2012 12:00 AM    B12 Injection([facility]) Ndc#2012-9642-52-    Reviewed

 

                    2017 12:00 AM    THERAPEUTIC PROPHYLACTIC/DX INJECTION SUBQ/IM    Reviewed

 

                    2017 12:00 AM    B12 1000mcg Injection, [facility] Medicare    Reviewed

 

                    2017 12:00 AM    MRI BRAIN STEM W/O & W/DYE    Reviewed

 

                    2017 12:00 AM    VITAMIN B-12        Reviewed

 

                    2017 12:00 AM    Speech Therapy Consult    Reviewed

 

                    2017 12:00 AM    ASSAY OF CREATININE    Reviewed

 

                    2012 12:00 AM    IMMUNOTHERAPY INJECTIONS    Reviewed

 

                    2012 12:00 AM    B12 Injection([facility]) Ndc#0842-1654-22-    Reviewed

 

                    2017 12:00 AM    URINALYSIS AUTO W/SCOPE    Reviewed

 

                    2012 12:00 AM    THER/PROPH/DIAG INJ SC/IM    Reviewed

 

                    2012 12:00 AM    B12 Injection, Up to 1000 Mcg NDC#8352-5896-19    Reviewed

 

                    2017 12:00 AM    URINALYSIS AUTO W/SCOPE    Reviewed

 

                    2017 2:18 PM    URINALYSIS AUTO W/O SCOPE    Reviewed

 

                    2017 12:00 AM    URINE CULTURE/COLONY COUNT    Reviewed

 

                    2017 12:00 AM    B12 1000mcg Injection    Reviewed

 

                    2017 12:00 AM    URNLS DIP STICK/TABLET RGNT AUTO W/O MICROSCOPY    Reviewed

 

                    2012 12:00 AM    THER/PROPH/DIAG INJ SC/IM    Reviewed

 

                    2012 12:00 AM    B12 Injection, Up to 1000 Mcg NDC#7967-2349-15    Reviewed

 

                    2012 12:00 AM    THER/PROPH/DIAG INJ SC/IM    Reviewed

 

                    2012 12:00 AM    B12 Injection, Up to 1000 Mcg NDC#0925-3650-43    Reviewed

 

                    10/16/2012 12:00 AM    THER/PROPH/DIAG INJ SC/IM    Reviewed

 

                    10/16/2012 12:00 AM    B12 Injection, Up to 1000 Mcg NDC#6946-2449-19    Reviewed

 

                    2010 12:00 AM    COMPREHEN METABOLIC PANEL    Reviewed

 

                    2010 12:00 AM    COMPLETE CBC W/AUTO DIFF WBC    Reviewed

 

                    2010 12:00 AM    LIPID PANEL         Reviewed

 

                    2013 12:00 AM    Flu Injection 3 Years And Above NDC# [address]-02  [facility]    Reviewed



 

                    2013 12:00 AM    COMPLETE CBC W/AUTO DIFF WBC    Reviewed

 

                    2013 12:00 AM    ASSAY OF LITHIUM    Reviewed

 

                    2013 12:00 AM    METABOLIC PANEL TOTAL CA    Reviewed

 

                    4/3/2013 12:00 AM    THER/PROPH/DIAG INJ SC/IM    Reviewed

 

                    4/3/2013 12:00 AM    B12 Injection, Up to 1000 Mcg NDC#4990-1860-99    Reviewed

 

                    2013 12:00 AM    THER/PROPH/DIAG INJ SC/IM    Reviewed

 

                    2013 12:00 AM    B12 Injection, Up to 1000 Mcg NDC#0276-5459-04    Reviewed

 

                    2013 12:00 AM    THER/PROPH/DIAG INJ SC/IM    Reviewed

 

                    2013 12:00 AM    B12 Injection, Up to 1000 Mcg NDC#9560-1882-62    Reviewed

 

                    2013 12:00 AM    LIPID PANEL         Reviewed

 

                    2013 12:00 AM    VITAMIN D 25 HYDROXY    Reviewed

 

                    2013 12:00 AM    THER/PROPH/DIAG INJ SC/IM    Reviewed

 

                    2013 12:00 AM    B12 Injection, Up to 1000 Mcg NDC#7237-9068-72    Reviewed

 

                    2013 12:00 AM    THER/PROPH/DIAG INJ SC/IM    Reviewed

 

                    3/6/2014 12:00 AM    THER/PROPH/DIAG INJ SC/IM    Reviewed

 

                    2014 12:00 AM    THER/PROPH/DIAG INJ SC/IM    Reviewed

 

                    2014 12:00 AM    B12 Injection, Up to 1000 Mcg NDC#1562-3739-56    Reviewed

 

                    2010 12:00 AM    SKIN FUNGI CULTURE    Reviewed

 

                    10/9/2010 12:00 AM    COMPREHEN METABOLIC PANEL    Reviewed

 

                    10/9/2010 12:00 AM    LIPID PANEL         Reviewed

 

                    2010 12:00 AM    THER/PROPH/DIAG INJ SC/IM    Reviewed

 

                    2010 12:00 AM    B12 Injection Ndc#[address]-17 ([facility])    Reviewed

 

                    2010 12:00 AM    THER/PROPH/DIAG INJ SC/IM    Reviewed

 

                    2010 12:00 AM    Kenalog 40 Mg Im-Ndc#[address]-94 ([facility])    Reviewed

 

                    10/15/2010 12:00 AM    FLU VACCINE 3 YRS & > IM    Reviewed

 

                    10/15/2010 12:00 AM    Admin.Of M/C Cov.Vaccine-Flu Vacc.    Reviewed

 

                    1/15/2011 12:00 AM    COMPLETE CBC W/AUTO DIFF WBC    Reviewed

 

                    1/15/2011 12:00 AM    COMPREHEN METABOLIC PANEL    Reviewed

 

                    1/15/2011 12:00 AM    LIPID PANEL         Reviewed

 

                    2014 12:00 AM    MAMMOGRAM SCREENING    Reviewed

 

                    2014 12:00 AM    Screening mammography, bilateral    Reviewed

 

                    7/10/2014 12:00 AM    THER/PROPH/DIAG INJ SC/IM    Reviewed

 

                    7/10/2014 12:00 AM    B12 Injection, Up to 1000 Mcg NDC#2244-5929-44    Reviewed

 

                    2011 12:00 AM    COMPLETE CBC W/AUTO DIFF WBC    Reviewed

 

                    2011 12:00 AM    COMPREHEN METABOLIC PANEL    Reviewed

 

                    2011 12:00 AM    LIPID PANEL         Reviewed

 

                    2014 12:00 AM    B12 Injection, Up to 1000 Mcg NDC#2157-4589-63    Reviewed

 

                    10/19/2014 12:00 AM    MAMMOGRAM SCREENING    Reviewed

 

                    10/19/2014 12:00 AM    Screening mammography, bilateral    Reviewed

 

                    10/16/2014 12:00 AM    B12 Injection, Up to 1000 Mcg NDC#9009-1069-94    Reviewed

 

                    10/16/2014 12:00 AM    COMPLETE CBC W/AUTO DIFF WBC    Reviewed

 

                    10/16/2014 12:00 AM    COMPREHEN METABOLIC PANEL    Reviewed

 

                    10/16/2014 12:00 AM    IMMUNOASSAY TUMOR     Reviewed

 

                    10/16/2014 12:00 AM    LIPID PANEL         Reviewed

 

                    10/16/2014 12:00 AM    ASSAY OF LITHIUM    Reviewed

 

                    10/16/2014 12:00 AM    MAMMOGRAM SCREENING    Reviewed

 

                    2011 12:00 AM    ASSAY OF PARATHORMONE    Reviewed

 

                    2011 12:00 AM    VITAMIN D 25 HYDROXY    Reviewed

 

                    2011 12:00 AM    ASSAY OF LITHIUM    Reviewed

 

                    2011 12:00 AM    METABOLIC PANEL TOTAL CA    Reviewed

 

                    2011 12:00 AM    CT HEAD/BRAIN W/O & W/DYE    Reviewed

 

                    3/23/2015 12:00 AM    PNEUMOCOCCAL VACC 13 GLENDY IM    Reviewed

 

                    3/23/2015 12:00 AM    Vitamin B12 injection    Reviewed

 

                    2011 12:00 AM    ASSAY OF LITHIUM    Reviewed

 

                    2011 12:00 AM    B12 Injection Ndc#[address]-68  [place]    Reviewed

 

                    2015 12:00 AM    THER/PROPH/DIAG INJ SC/IM    Reviewed

 

                    2015 12:00 AM    B12 Injection, Up to 1000 Mcg NDC#4021-7142-99    Reviewed

 

                    2015 12:00 AM    COMPLETE CBC W/AUTO DIFF WBC    Reviewed

 

                    2015 12:00 AM    COMPREHEN METABOLIC PANEL    Reviewed

 

                    2015 12:00 AM    LIPID PANEL         Reviewed

 

                    2015 12:00 AM    ASSAY OF LITHIUM    Reviewed

 

                    2011 12:00 AM    VIT D 1 25-DIHYDROXY    Reviewed

 

                    2011 12:00 AM    VITAMIN B-12        Reviewed

 

                    2015 12:00 AM    B12 Injection, Up to 1000 Mcg NDC#6641-7695-17    Reviewed

 

                    2015 12:00 AM    THER/PROPH/DIAG INJ SC/IM    Reviewed

 

                    2015 12:00 AM    B12 Injection, Up to 1000 Mcg NDC#7738-0469-69    Reviewed

 

                    2011 12:00 AM    THER/PROPH/DIAG INJ SC/IM    Reviewed

 

                    2011 12:00 AM    B12 Injection ([employer]) Ndc#5655-3270-75    Reviewed

 

                    2015 12:00 AM    THER/PROPH/DIAG INJ SC/IM    Reviewed

 

                    2015 12:00 AM    B12 Injection, Up to 1000 Mcg NDC#5980-3256-69    Reviewed







Results Summary







                          Date and Description      Results

 

                          2004 12:00 AM        Colonoscopy-Women and Men over 50 Normal 

 

                          2008 12:00 AM         Pap Smear Declined 

 

                          10/7/2009 12:00 AM        Cholest Cry Stone Ql .0 %LDLc SerPl-mCnc 123.0 mg/dLHDLc

 SerPl-mCnc 34.0 mg/dLTrigl SerPl-mCnc 190.0 mg/dLGlucose SerPl-mCnc 78.0 mg/dL

 

                          2009 12:00 AM        Mammogram -Women over 40 Normal HIV1+2 Ab Ser Ql no risk 

 

                          2010 8:47 AM         Dexa Bone Scan Refused Aspirin reccommended Contraindication 



 

                          2010 8:48 AM         Depression Done 

 

                          2010 12:00 AM         Foot Exam-Diabetic Done 

 

                          2010 12:00 AM         Cholest Cry Stone Ql .0 %LDLc SerPl-mCnc 126.0 mg/dLGlucose

 SerPl-mCnc 102.0 mg/dL

 

                          2010 8:45 AM          TRIGLYCERIDES 122.0 mg/dLCHOLESTEROL 186.0 mg/dLHDL 36.0 mg/dLTOT

 CHOL/HDL 5.2 LDL (CALC) 126.0 mg/dLGLUCOSE 102.0 mg/dLSODIUM 143.0 
mmol/LPOTASSIUM 3.70 mmol/LCHLORIDE 111.0 mmol/LCO2 23.0 mmol/LBUN 10.0 
mg/dLCREATININE 0.80 mg/dLSGOT/AST 12.0 IU/LSGPT/ALT 11.0 IU/LALK PHOS 65.0 
IU/LTOTAL PROTEIN 7.20 g/dLALBUMIN 3.90 g/dLTOTAL BILI 0.50 mg/dLCALCIUM 10.20 
mg/dLAGE 59 GFR NonAA 73 GFR AA 88 eGFR >60 mL/min/1.73 m2eGFR AA* >60 WBC 5.7 
RBC 3.26 HGB 10.60 g/dLHCT 31.70 %MCV 97.0 fLMCH 32.50 pgMCHC 33.40 g/dLRDW SD 
47 RDW CV 13.30 %MPV 9.70 fLPLT 287 NRBC# 0.00 NRBC% 0.0 %NEUT 62.90 %%LYMP 
21.80 %%MONO 9.90 %%EOS 5.0 %%BASO 0.40 %#NEUT 3.56 #LYMP 1.23 #MONO 0.56 #EOS 
0.28 #BASO 0.02 MANUAL DIFF NOT IND 

 

                          2010 12:00 AM        Glucose SerPl-mCnc 96.0 mg/dLCholest Cry Stone Ql .0 %LDLc

 SerPl-mCnc 146.0 mg/dL

 

                          2010 8:26 AM         TRIGLYCERIDES 106.0 mg/dLCHOLESTEROL 199.0 mg/dLHDL 32.0 mg/dLTOT

 CHOL/HDL 6.2 LDL (CALC) 146.0 mg/dLGLUCOSE 96.0 mg/dLSODIUM 143.0 
mmol/LPOTASSIUM 4.0 mmol/LCHLORIDE 113.0 mmol/LCO2 24.0 mmol/LBUN 13.0 
mg/dLCREATININE 1.0 mg/dLSGOT/AST 11.0 IU/LSGPT/ALT 6.0 IU/LALK PHOS 56.0 
IU/LTOTAL PROTEIN 6.60 g/dLALBUMIN 3.80 g/dLTOTAL BILI 0.50 mg/dLCALCIUM 9.30 
mg/dLAGE 59 GFR NonAA 57 GFR AA 69 eGFR 57 eGFR AA* >60 

 

                          10/6/2010 12:00 AM        Cholest Cry Stone Ql .0 %LDLc SerPl-mCnc 111.0 mg/dLGlucose

 SerPl-mCnc 81.0 mg/dL

 

                          10/6/2010 2:45 PM         TRIGLYCERIDES 123.0 mg/dLCHOLESTEROL 178.0 mg/dLHDL 42.0 mg/dLTOT

 CHOL/HDL 4.2 LDL (CALC) 111.0 mg/dLGLUCOSE 81.0 mg/dLSODIUM 139.0 
mmol/LPOTASSIUM 4.10 mmol/LCHLORIDE 106.0 mmol/LCO2 24.0 mmol/LBUN 13.0 
mg/dLCREATININE 0.90 mg/dLSGOT/AST 13.0 IU/LSGPT/ALT 11.0 IU/LALK PHOS 61.0 
IU/LTOTAL PROTEIN 7.10 g/dLALBUMIN 3.90 g/dLTOTAL BILI 0.30 mg/dLCALCIUM 9.30 
mg/dLAGE 60 GFR NonAA 64 GFR AA 78 eGFR >60 mL/min/1.73 m2eGFR AA* >60 WBC 6.9 
RBC 3.59 HGB 11.50 g/dLHCT 35.30 %MCV 98.0 fLMCH 32.0 pgMCHC 32.60 g/dLRDW SD 46
 RDW CV 12.90 %MPV 9.90 fLPLT 311 NRBC# 0.00 NRBC% 0.0 %NEUT 64.90 %%LYMP 22.50 
%%MONO 7.20 %%EOS 5.10 %%BASO 0.30 %#NEUT 4.45 #LYMP 1.54 #MONO 0.49 #EOS 0.35 
#BASO 0.02 MANUAL DIFF NOT IND 

 

                          2011 12:00 AM         Mammogram -Women over 40 Ordered 

 

                          2011 10:25 AM        TRIGLYCERIDES 111.0 mg/dLCHOLESTEROL 195.0 mg/dLHDL 43.0 mg/dLTOT

 CHOL/HDL 4.5 LDL (CALC) 130.0 mg/dLWBC 5.3 RBC 3.76 HGB 12.0 g/dLHCT 37.80 %MCV
 101.0 fLMCH 31.90 pgMCHC 31.70 g/dLRDW SD 47 RDW CV 13.0 %MPV 9.70 fLPLT 259 
NRBC# 0.00 NRBC% 0.0 %NEUT 69.0 %%LYMP 17.60 %%MONO 8.30 %%EOS 4.70 %%BASO 0.40 
%#NEUT 3.63 #LYMP 0.93 #MONO 0.44 #EOS 0.25 #BASO 0.02 MANUAL DIFF NOT IND 
GLUCOSE 102.0 mg/dLSODIUM 146.0 mmol/LPOTASSIUM 4.20 mmol/LCHLORIDE 113.0 
mmol/LCO2 23.0 mmol/LBUN 15.0 mg/dLCREATININE 1.0 mg/dLSGOT/AST 12.0 
IU/LSGPT/ALT 17.0 IU/LALK PHOS 60.0 IU/LTOTAL PROTEIN 6.90 g/dLALBUMIN 4.20 
g/dLTOTAL BILI 0.40 mg/dLCALCIUM 9.70 mg/dLAGE 60 GFR NonAA 57 GFR AA 69 eGFR 57
 eGFR AA* >60 

 

                          2011 11:49 AM        Cholest Cry Stone Ql .0 %LDLc SerPl-mCnc 130.0 mg/dLHDLc

 SerPl-mCnc 43.0 mg/dLTrigl SerPl-mCnc 111.0 mg/dLGlucose SerPl-mCnc 102.0 mg/dL

 

                          2011 11:52 AM        Pap Smear Declined 

 

                          2011 11:28 AM        Lithium 2.080 mmol/LGLUCOSE 102.0 mg/dLSODIUM 135.0 mmol/LPOTASSIUM

 3.90 mmol/LCHLORIDE 106.0 mmol/LCO2 21.0 mmol/LBUN 12.0 mg/dLCREATININE 1.30 
mg/dLCALCIUM 10.70 mg/dLAGE 60 GFR NonAA 42 GFR AA 51 eGFR 42 eGFR AA* 51 

 

                          2011 8:58 AM          Lithium 0.690 mmol/L

 

                          2011 2:38 PM         VITAMIN B12 3483.0 pg/mL

 

                          2013 3:35 PM          WBC 5.1 RBC 3.73 HGB 11.70 g/dLHCT 36.40 %MCV 98.0 fLMCH 31.40

 pgMCHC 32.10 g/dLRDW SD 47 RDW CV 13.10 %MPV 9.80 fLPLT 224 NRBC# 0.00 NRBC% 
0.0 %NEUT 66.80 %%LYMP 19.10 %%MONO 9.0 %%EOS 4.90 %%BASO 0.20 %#NEUT 3.42 #LYMP
 0.98 #MONO 0.46 #EOS 0.25 #BASO 0.01 MANUAL DIFF NOT IND GLUCOSE 88.0 
mg/dLSODIUM 141.0 mmol/LPOTASSIUM 4.10 mmol/LCHLORIDE 110.0 mmol/LCO2 22.0 
mmol/LBUN 22.0 mg/dLCREATININE 1.10 mg/dLCALCIUM 9.80 mg/dLAGE 62 GFR NonAA 50 
GFR AA 61 eGFR 50 eGFR AA* 60 Lithium 0.760 mmol/L

 

                          2013 11:02 AM        TRIGLYCERIDES 106.0 mg/dLCHOLESTEROL 181.0 mg/dLHDL 46.0 mg/dLTOT

 CHOL/HDL 3.9 LDL (CALC) 114.0 mg/dLVITAMIN D 41.10 ng/mL

 

                          10/17/2014 10:10 AM       WBC 5.0 RBC 3.66 HGB 11.60 g/dLHCT 36.80 %.0 fLMCH 31.70

 pgMCHC 31.50 g/dLRDW SD 50 RDW CV 13.50 %MPV 10.10 fLPLT 209 NRBC# 0.00 NRBC% 
0.0 %NEUT 69.20 %%LYMP 21.0 %%MONO 6.40 %%EOS 3.20 %%BASO 0.20 %#NEUT 3.46 #LYMP
 1.05 #MONO 0.32 #EOS 0.16 #BASO 0.01 MANUAL DIFF NOT IND GLUCOSE 100.0 
mg/dLSODIUM 148.0 mmol/LPOTASSIUM 3.90 mmol/LCHLORIDE 114.0 mmol/LCO2 26.0 
mmol/LBUN 12.0 mg/dLCREATININE 1.20 mg/dLSGOT/AST 9.0 IU/LSGPT/ALT <6 IU/LALK 
PHOS 82.0 IU/LTOTAL PROTEIN 6.90 g/dLALBUMIN 4.0 g/dLTOTAL BILI 0.40 
mg/dLCALCIUM 10.50 mg/dLAGE 64 GFR NonAA 45 GFR AA 55 eGFR 45 eGFR AA* 55 
TRIGLYCERIDES 96.0 mg/dLCHOLESTEROL 155.0 mg/dLHDL 38.0 mg/dLTOT CHOL/HDL 4.1 
LDL (CALC) 98.0 mg/dLLithium 0.850 mmol/LCancer Antigen (CA) 125 8.30 U/mL

 

                          2015 10:25 AM        Lithium 0.790 mmol/LWBC 4.8 RBC 3.44 HGB 11.0 g/dLHCT 35.20 

%.0 fLMCH 32.0 pgMCHC 31.30 g/dLRDW SD 53 RDW CV 14.0 %MPV 9.30 fLPLT 210
 NRBC# 0.00 NRBC% 0.0 %NEUT 70.80 %%LYMP 17.20 %%MONO 8.10 %%EOS 3.50 %%BASO 
0.40 %#NEUT 3.41 #LYMP 0.83 #MONO 0.39 #EOS 0.17 #BASO 0.02 MANUAL DIFF NOT IND 
TRIGLYCERIDES 107.0 mg/dLCHOLESTEROL 174.0 mg/dLHDL 43.0 mg/dLTOT CHOL/HDL 4.0 
LDL (CALC) 110.0 mg/dLGLUCOSE 90.0 mg/dLSODIUM 145.0 mmol/LPOTASSIUM 3.80 
mmol/LCHLORIDE 115.0 mmol/LCO2 24.0 mmol/LBUN 17.0 mg/dLCREATININE 1.30 
mg/dLSGOT/AST 18.0 IU/LSGPT/ALT 17.0 IU/LALK PHOS 56.0 IU/LTOTAL PROTEIN 6.70 
g/dLALBUMIN 3.90 g/dLTOTAL BILI 0.40 mg/dLCALCIUM 9.80 mg/dLAGE 64 GFR NonAA 41 
GFR AA 50 eGFR 41 eGFR AA* 50 

 

                          2015 8:50 AM        WBC 5.8 RBC 3.29 HGB 10.70 g/dLHCT 34.0 %.0 fLMCH 32.50

 pgMCHC 31.50 g/dLRDW SD 52 RDW CV 13.60 %MPV 9.60 fLPLT 223 NRBC# 0.00 NRBC% 
0.0 %NEUT 69.60 %%LYMP 18.90 %%MONO 8.50 %%EOS 2.80 %%BASO 0.20 %#NEUT 4.03 
#LYMP 1.09 #MONO 0.49 #EOS 0.16 #BASO 0.01 MANUAL DIFF NOT IND Lithium 0.620 
mmol/LGLUCOSE 83.0 mg/dLSODIUM 139.0 mmol/LPOTASSIUM 3.90 mmol/LCHLORIDE 109.0 
mmol/LCO2 22.0 mmol/LBUN 19.0 mg/dLCREATININE 1.40 mg/dLSGOT/AST 19.0 
IU/LSGPT/ALT 21.0 IU/LALK PHOS 55.0 IU/LTOTAL PROTEIN 6.50 g/dLALBUMIN 3.90 
g/dLTOTAL BILI 0.50 mg/dLCALCIUM 9.60 mg/dLAGE 65 GFR NonAA 38 GFR AA 46 eGFR 38
 eGFR AA* 46 TRIGLYCERIDES 121.0 mg/dLCHOLESTEROL 192.0 mg/dLHDL 51.0 mg/dLTOT 
CHOL/HDL 3.8 .0 mg/dLFREE T4 0.79 TSH 1.210 uIU/mLHemoglobin A1c 5.40 
%Estim. Avg Glu (eAG) 108 

 

                          3/15/2016 8:08 AM         VITAMIN B12 696.0 pg/mL

 

                          3/23/2016 8:26 AM         WBC 7.0 RBC 3.61 HGB 11.80 g/dLHCT 37.70 %.0 fLMCH 32.70

 pgMCHC 31.30 g/dLRDW SD 49 RDW CV 12.50 %MPV 10.0 fLPLT 207 NRBC# 0.00 NRBC% 
0.0 %NEUT 73.60 %%LYMP 16.40 %%MONO 6.60 %%EOS 3.0 %%BASO 0.30 %#NEUT 5.15 #LYMP
 1.15 #MONO 0.46 #EOS 0.21 #BASO 0.02 MANUAL DIFF NOT IND Lithium 0.940 
mmol/LGLUCOSE 108.0 mg/dLSODIUM 143.0 mmol/LPOTASSIUM 4.30 mmol/LCHLORIDE 110.0 
mmol/LCO2 27.0 mmol/LBUN 16.0 mg/dLCREATININE 1.60 mg/dLSGOT/AST 13.0 
IU/LSGPT/ALT 7.0 IU/LALK PHOS 71.0 IU/LTOTAL PROTEIN 6.80 g/dLALBUMIN 4.0 
g/dLTOTAL BILI 0.20 mg/dLCALCIUM 10.40 mg/dLAGE 65 GFR NonAA 32 GFR AA 39 eGFR 
32 eGFR AA* 39 TRIGLYCERIDES 113.0 mg/dLCHOLESTEROL 169.0 mg/dLHDL 42.0 mg/dLTOT
 CHOL/HDL 4.0 LDL (CALC) 104.0 mg/dLFREE T4 0.86 TSH 2.20 uIU/mLHemoglobin A1c 
5.20 %Estim. Avg Glu (eAG) 103 

 

                          3/25/2016 9:17 AM         COLOR YELLOW APPEARANCE CLEAR SPEC GRAV 1.010 pH 7.0 PROTEIN 

NEGATIVE GLUCOSE NEGATIVE mg/dLKETONE NEGATIVE BILIRUBIN NEGATIVE BLOOD NEGATIVE
 NITRITE NEGATIVE [place] SCREEN SMALL MICRO IND? SEE BELOW WBC/HPF 0-5 RBC/HPF 
NEGATIVE CASTS/LPF NEGATIVE /LPFCRYSTALS NEGATIVE MUCOUS THRDS NEGATIVE BACTERIA
 NEGATIVE EPITH CELLS FEW SQUAMOUS /HPFTRICHOMONAS NEGATIVE YEAST NEGATIVE 

 

                          2016 6:00 AM        GLUCOSE 91.0 mg/dLSODIUM 143.0 mmol/LPOTASSIUM 3.60 mmol/LCHLORIDE

 112.0 mmol/LCO2 23.0 mmol/LBUN 22.0 mg/dLCREATININE 1.20 mg/dLSGOT/AST 15.0 
IU/LSGPT/ALT 12.0 IU/LALK PHOS 61.0 IU/LTOTAL PROTEIN 5.40 g/dLALBUMIN 3.10 
g/dLTOTAL BILI 0.40 mg/dLCALCIUM 8.40 mg/dLAGE 66 GFR NonAA 45 GFR AA 55 eGFR 45
 eGFR AA* 55 WBC 3.0 RBC 3.05 HGB 9.80 g/dLHCT 32.10 %.0 fLMCH 32.10 
pgMCHC 30.50 g/dLRDW SD 54 RDW CV 14.20 %MPV 10.10 fLPLT 170 NRBC# 0.00 NRBC% 
0.0 %NEUT 50.70 %%LYMP 32.60 %%MONO 10.50 %%EOS 5.90 %%BASO 0.30 %#NEUT 1.54 
#LYMP 0.99 #MONO 0.32 #EOS 0.18 #BASO 0.01 MANUAL DIFF NOT IND 

 

                          2016 2:09 PM        COLOR YELLOW APPEARANCE CLEAR SPEC GRAV 1.010 pH 5.0 PROTEIN

 30 GLUCOSE NEGATIVE mg/dLKETONE NEGATIVE BILIRUBIN NEGATIVE BLOOD LARGE NITRITE
 NEGATIVE [place] SCREEN MODERATE MICRO INDICATED? SEE BELOW WBC/HPF  RBC/HPF
 20-50 CASTS/LPF NEGATIVE /LPFCRYSTALS NEGATIVE MUCOUS THRDS NEGATIVE BACTERIA 
NEGATIVE EPITH CELLS FEW SQUAMOUS /HPFTRICHOMONAS NEGATIVE YEAST NEGATIVE CULT 
SET UP? YES 

 

                          1/3/2017 4:08 PM          COLOR YELLOW APPEARANCE HAZY SPEC GRAV 1.015 pH 6.0 PROTEIN 30

 GLUCOSE NEGATIVE mg/dLKETONE NEGATIVE BILIRUBIN NEGATIVE BLOOD MODERATE NITRITE
 NEGATIVE [place] SCREEN LARGE MICRO INDICATED? SEE BELOW WBC/-200 RBC/HPF 
5-10 CASTS/LPF NEGATIVE /LPFCRYSTALS NEGATIVE MUCOUS THRDS NEGATIVE BACTERIA 
NEGATIVE EPITH CELLS 1+ SQUAMOUS /HPFTRICHOMONAS NEGATIVE YEAST NEGATIVE CULT 
SET UP? YES 

 

                          2017 4:24 PM         CREATININE 1.50 mg/dLAGE 66 GFR NonAA 35 GFR AA 42 eGFR 35 eGFR

 AA* 42 VITAMIN B12 1324.0 pg/mL

 

                          2017 11:30 AM         GLUCOSE 93.0 mg/dLSODIUM 143.0 mmol/LPOTASSIUM 3.10 mmol/LCHLORIDE

 101.0 mmol/LCO2 29.0 mmol/LBUN 26.0 mg/dLCREATININE 1.50 mg/dLSGOT/AST 23.0 
IU/LSGPT/ALT 13.0 IU/LALK PHOS 66.0 IU/LTOTAL PROTEIN 7.70 g/dLALBUMIN 4.30 
g/dLTOTAL BILI 0.40 mg/dLCALCIUM 10.30 mg/dLAGE 66 GFR NonAA 35 GFR AA 42 eGFR 
35 eGFR AA* 42 TRIGLYCERIDES 147.0 mg/dLCHOLESTEROL 184.0 mg/dLHDL 44.0 mg/dLTOT
 CHOL/HDL 4.2 .0 mg/dLWBC 5.4 RBC 3.98 HGB 12.90 g/dLHCT 40.20 %.0
 fLMCH 32.40 pgMCHC 32.10 g/dLRDW SD 50 RDW CV 13.50 %MPV 9.30 fLPLT 210 NRBC# 
0.00 NRBC% 0.0 %NEUT 54.20 %%LYMP 30.70 %%MONO 9.10 %%EOS 5.20 %%BASO 0.40 
%#NEUT 2.94 #LYMP 1.66 #MONO 0.49 #EOS 0.28 #BASO 0.02 MANUAL DIFF NOT IND FREE 
T4 1.09 COLOR YELLOW APPEARANCE CLEAR SPEC GRAV <=1.005 pH 5.5 PROTEIN NEGATIVE 
GLUCOSE NEGATIVE mg/dLKETONE NEGATIVE BILIRUBIN NEGATIVE BLOOD NEGATIVE NITRITE 
NEGATIVE [place] SCREEN LARGE MICRO INDICATED? SEE BELOW WBC/HPF 10-20 RBC/HPF 0-5 
CASTS/LPF NEGATIVE /LPFCRYSTALS NEGATIVE MUCOUS THRDS NEGATIVE BACTERIA FEW 
EPITH CELLS 1+ SQUAMOUS /HPFTRICHOMONAS NEGATIVE YEAST NEGATIVE CULT SET UP? YES
 TSH 1.820 uIU/mL

 

                          2017 2:45 PM         COLOR YELLOW APPEARANCE CLEAR SPEC GRAV <=1.005 pH 6.0 PROTEIN

 NEGATIVE GLUCOSE NEGATIVE mg/dLKETONE NEGATIVE BILIRUBIN NEGATIVE BLOOD TRACE-
INTACT NITRITE NEGATIVE LEUK SCREEN SMALL MICRO INDICATED? SEE BELOW WBC/HPF 0-5
 RBC/HPF NEGATIVE CASTS/LPF NEGATIVE /LPFCRYSTALS NEGATIVE MUCOUS THRDS NEGATIVE
 BACTERIA FEW EPITH CELLS FEW SQUAMOUS /HPFTRICHOMONAS NEGATIVE YEAST NEGATIVE 
CULT SET UP? YES 

 

                          2017 11:22 AM        COLOR YELLOW APPEARANCE CLEAR SPEC GRAV <=1.005 pH 6.5 PROTEIN

 NEGATIVE GLUCOSE NEGATIVE mg/dLKETONE NEGATIVE BILIRUBIN NEGATIVE BLOOD 
NEGATIVE NITRITE NEGATIVE LEUK SCREEN NEGATIVE MICRO INDICATED? NOT INDICATED 

 

                          2017 2:18 PM         Clarity Ur cloudy Color Ur dark yellow Glucose Ur-sCnc negative

 Bilirub Ur Ql Strip negative Ketones Ur Ql Strip negative Sp Gr Ur Qn 1.010 Hgb
 Ur Ql Strip trace-intact pH Ur-LsCnc 6.5 Prot Ur Ql Strip trace Urobilinogen 
Ur-mCnc 0.2 Nitrite Ur Ql Strip negative WBC Est Ur Ql Strip large 

 

                          2017 9:52 AM         COLOR YELLOW APPEARANCE CLOUDY SPEC GRAV <=1.005 pH 6.5 PROTEIN

 NEGATIVE GLUCOSE NEGATIVE mg/dLKETONE NEGATIVE BILIRUBIN NEGATIVE BLOOD SMALL 
NITRITE POSITIVE LEUK SCREEN LARGE MICRO INDICATED? SEE BELOW WBC/-300 
RBC/HPF 5-10 CASTS/LPF NEGATIVE /LPFCRYSTALS NEGATIVE MUCOUS THRDS NEGATIVE 
BACTERIA 2++ EPITH CELLS 1+ SQUAMOUS /HPFTRICHOMONAS NEGATIVE YEAST NEGATIVE 
CULT SET UP? YES 







History Of Immunizations







       Name    Date Admin    Mfg Name    Mfg Code    Trade Name    Lot#    Route    Inj    Vis Given    Vis

 Pub                                    CVX

 

        Influenza    2008    Not Entered    NE      Not Entered            Not Entered    Not Entered

                    1            999

 

        X       12/19/2008    [employer]    MSD     Pneumovax 23            Intramuscular    Not Entered

                    1            999

 

           Influenza    10/15/2010    [employer]rely[employer]    NOV        Fluvirin > 12 Years    

[id number]     Intramuscular    Left Deltoid    10/15/2010    2009    999

 

          X         3/23/2015    [employer]    [facility]       Prevnar 13    [id number]    Intramuscular

                Right Gluteous Medius    3/23/2015       2013       109







History of Past Illness







                    Name                Date of Onset       Comments

 

                    Peritoneal Neoplasm, Malignant                         

 

                    Hyperlipidemia                           

 

                    Bipolar disorder, unspecified                         

 

                    Artificial opening status; colostomy                         

 

                    B12 deficiency                           

 

                    Anemia, Pernicious                         

 

                    Arthritis unspecified                         

 

                    cervical cancer                          

 

                    Artificial opening status; colostomy    2010  1:10PM     

 

                    Bipolar disorder, unspecified    2010  1:10PM     

 

                    Hyperlipidemia      2010  1:10PM     

 

                    Anemia, Pernicious    2010  1:10PM     

 

                    Postoperative Follow-Up    2010  1:55PM     

 

                    Postoperative Follow-Up    Mar  8 2010 10:57AM     

 

                    Artificial opening status; colostomy    Mar  8 2010  1:19PM     

 

                    Peritoneal Neoplasm, Malignant    Mar  8 2010  1:19PM     

 

                    Artificial opening status; colostomy    2010  1:40PM     

 

                    Hyperlipidemia      2010  1:40PM     

 

                    Anemia, Pernicious    2010  1:40PM     

 

                    Peritoneal Neoplasm, Malignant    2010  1:40PM     

 

                    Arthritis unspecified    2010  1:40PM     

 

                    Anemia of Chronic Illness    2010  1:40PM     

 

                    Tinea corporis      2010  3:17PM     

 

                    Bipolar disorder, unspecified    2010  1:33PM     

 

                    Hyperlipidemia      2010  1:33PM     

 

                    Anemia, Pernicious    2010  1:33PM     

 

                    Peritoneal Neoplasm, Malignant    2010  1:33PM     

 

                    B12 deficiency      2010  1:33PM     

 

                    Ethmoidal Sinusitis, Acute    Sep 21 2010  3:53PM     

 

                    Wheezing            Sep 21 2010  3:53PM     

 

                    Flu                 Oct 15 2010  1:40PM     

 

                    Bipolar disorder, unspecified    Oct 15 2010  1:42PM     

 

                    Hyperlipidemia      Oct 15 2010  1:42PM     

 

                    Anemia, Pernicious    Oct 15 2010  1:42PM     

 

                    Peritoneal Neoplasm, Malignant    Oct 15 2010  1:42PM     

 

                    Bipolar disorder, unspecified    2011 12:01PM     

 

                    Hyperlipidemia      2011 12:01PM     

 

                    Anemia, Pernicious    2011 12:01PM     

 

                    Peritoneal Neoplasm, Malignant    2011 12:01PM     

 

                    Bipolar disorder, unspecified    Apr 15 2011 10:55AM     

 

                    Major Depression    2011 10:11AM     

 

                    Bipolar Disorder    2011 10:11AM     

 

                    Cancer              May 10 2011  4:16PM     

 

                    Major Depression    May 10 2011  3:16PM     

 

                    Bipolar Disorder    May 10 2011  3:16PM     

 

                    Hypercalcemia       May 23 2011  2:47PM     

 

                    Bipolar disorder, unspecified    May 23 2011  2:47PM     

 

                    Colon Cancer, Personal History    May 23 2011  2:47PM     

 

                    Bipolar Disorder    May 31 2011  4:39PM     

 

                    Depressive Disorder    2011 10:01AM     

 

                    Vitamin B12 deficiency    2011 10:01AM     

 

                    Vitamin D Deficiency    2011  5:07PM     

 

                    Anemia, Vitamin B12 Deficiency    2011  5:07PM     

 

                    B12 deficiency      2011  3:56PM     

 

                    Routine gynecological examination    Aug  4 2011  9:08AM     

 

                    Screening Examination for Breast Cancer    Aug  4 2011  9:08AM     

 

                    Tinea Corporis      Aug  4 2011  9:08AM     

 

                    Depressive Disorder    Sep 23 2011  8:47AM     

 

                    Contact Dermatitis    Sep 23 2011  8:47AM     

 

                    Anemia, Pernicious    Sep 23 2011  8:47AM     

 

                    B12 deficiency      Sep 23 2011  8:47AM     

 

                    B12 deficiency      Sep 27 2011  2:58PM     

 

                    B12 deficiency      Oct 20 2011  2:34PM     

 

                    Flu                 Dec  9 2011  3:16PM     

 

                    B12 deficiency      Dec  9 2011  3:17PM     

 

                    B12 deficiency      2012  4:52PM     

 

                    B12 deficiency      Fe 23 2012 11:10AM     

 

                    B12 deficiency      2012  3:37PM     

 

                    B12 deficiency      May  3 2012  4:10PM     

 

                    B12 deficiency      2012  2:54PM     

 

                    B12 deficiency      2012 11:23AM     

 

                    B12 deficiency      Aug  9 2012  2:08PM     

 

                    B12 deficiency      Sep  6 2012  4:36PM     

 

                    B12 deficiency      Oct 16 2012 10:23AM     

 

                    Flu                 2013  3:11PM     

 

                    Bipolar disorder, unspecified    2013  2:48PM     

 

                    Anemia, Pernicious    2013  2:48PM     

 

                    B12 deficiency      Feb  2013  2:48PM     

 

                    Extrapyramidal abnormal movement disorder    2013  2:48PM     

 

                    B12 deficiency      Apr  3 2013 12:03PM     

 

                    Bipolar disorder, unspecified    May  7 2013  1:31PM     

 

                    Anemia, Pernicious    May  7 2013  1:31PM     

 

                    B12 deficiency      May  7 2013  1:31PM     

 

                    Extrapyramidal abnormal movement disorder    May  7 2013  1:31PM     

 

                    B12 deficiency      2013  3:42PM     

 

                    B12 deficiency      2013  1:31PM     

 

                    Hyperlipidemia      Aug  7 2013 10:37AM     

 

                    Vitamin D Deficiency    Aug  7 2013 10:37AM     

 

                    Bipolar disorder, unspecified    Aug  7 2013 10:37AM     

 

                    Anemia, Pernicious    Aug  7 2013 10:37AM     

 

                    B12 deficiency      Aug  7 2013 10:37AM     

 

                    B12 deficiency      Sep 25 2013 11:15AM     

 

                    B12 deficiency      Dec 11 2013  3:16PM     

 

                    B12 deficiency      Mar  6 2014  1:48PM     

 

                    B12 deficiency      May 21 2014  3:17PM     

 

                    Screening Examination for Breast Cancer    2014  3:23PM     

 

                    Periumbilical abdominal pain    2014  3:23PM     

 

                    B12 deficiency      Jul 10 2014  2:52PM     

 

                    Anemia, Vitamin B12 Deficiency    Aug 13 2014  4:50PM     

 

                    Bipolar disorder    Oct 16 2014 11:13AM     

 

                    Hyperlipidemia      Oct 16 2014 11:13AM     

 

                    Anemia, Pernicious    Oct 16 2014 11:13AM     

 

                    Peritoneal Neoplasm, Malignant    Oct 16 2014 11:13AM     

 

                    Screening breast examination    Oct 16 2014 11:13AM     

 

                    Weight loss         Oct 16 2014 11:13AM     

 

                    Anemia, Pernicious    Mar 23 2015  2:57PM     

 

                    B12 deficiency      Mar 23 2015  2:57PM     

 

                    Need for Prevnar vaccine    Mar 23 2015  2:57PM     

 

                    Bipolar disorder    Mar 23 2015  2:57PM     

 

                    Hyperlipidemia      Mar 23 2015  2:57PM     

 

                    Anemia, Pernicious    Mar 23 2015  2:57PM     

 

                    Peritoneal Neoplasm, Malignant    Mar 23 2015  2:57PM     

 

                    B12 deficiency      May  4 2015  4:48PM     

 

                    Hyperlipidemia      May 13 2015  9:56AM     

 

                    Anemia              May 13 2015  9:56AM     

 

                    Bipolar disorder    May 13 2015  9:56AM     

 

                    Bipolar disorder    May 14 2015  3:27PM     

 

                    Hyperlipidemia      May 14 2015  3:27PM     

 

                    Anemia, Pernicious    May 14 2015  3:27PM     

 

                    Peritoneal Neoplasm, Malignant    May 14 2015  3:27PM     

 

                    B12 deficiency      2015  2:20PM     

 

                    B12 deficiency      2015 11:34AM     

 

                    B12 deficiency      Aug 18 2015  9:06AM     

 

                    Tinea Corporis      Sep 18 2015  8:54AM     

 

                    B12 deficiency      Sep 18 2015  8:54AM     

 

                    B12 deficiency      2015 10:28AM     

 

                    Herpes zoster without complication    Dec  3 2015  9:52AM     

 

                    B12 deficiency      Dec 23 2015 11:21AM     

 

                    B12 deficiency      2016  4:51PM     

 

                    Vitamin B 12 deficiency    Mar 14 2016  5:35PM     

 

                    B12 deficiency      Mar 15 2016 12:14PM     

 

                    B12 deficiency      May  5 2016 11:30AM     

 

                    Edema               May  5 2016 11:30AM     

 

                    Dermatitis          May  5 2016 11:30AM     

 

                    Edema               May 17 2016  8:38AM     

 

                    Shortness of breath    May 17 2016  8:38AM     

 

                    Bilateral edema of lower extremity    2016  2:06PM     

 

                    B12 deficiency      2016  2:06PM     

 

                    B12 deficiency      2016 11:50AM     

 

                    B12 deficiency      2016 11:20AM     

 

                    Diarrhea            Aug  2 2016  3:13PM     

 

                    B12 deficiency      Aug 24 2016 11:10AM     

 

                    Encounter for screening mammogram for breast cancer    Aug 24 2016 11:44AM     

 

                    B12 deficiency      Sep 28 2016  2:35PM     

 

                    B12 deficiency      Dec 15 2016  2:02PM     

 

                    Dysuria             Dec 29 2016 12:14PM     

 

                    Hematuria           Fidencio  3 2017  1:33PM     

 

                    Constipation by delayed colonic transit    2017  1:52PM     

 

                    Ileus               2017  1:52PM     

 

                    UTI (urinary tract infection)    Fidencio 15 2017  3:39PM     

 

                    Acute cystitis with hematuria    2017 11:07AM     

 

                    B12 deficiency      2017 11:07AM     

 

                    B12 deficiency      2017 11:40AM     

 

                    B12 deficiency      2017  4:07PM     

 

                    Slurred speech      2017  3:07PM     

 

                    Vitamin B12 deficiency    2017  3:07PM     

 

                    Dysphagia, unspecified type    2017  3:07PM     

 

                    Hyperlipidemia      2017  3:07PM     

 

                    Dysuria             2017 12:01PM     

 

                    B12 deficiency      2017  2:08PM     

 

                    Dysuria             2017 10:58AM     

 

                    Hematuria           May 22 2017  1:36PM     

 

                    Depressive Disorder    May 22 2017  1:36PM     

 

                    Constipation        May 22 2017  1:36PM     

 

                    Fatigue             May 22 2017  1:36PM     

 

                    Urinary tract infection    May 30 2017  9:36AM     

 

                    Hypokalemia         May 30 2017 12:03PM     







Payers







           Insurance Name    Company Name    Plan Name    Plan Number    Policy Number    Policy Group

 Number                                 Start Date

 

                    Medicare RHC    Medicare RHC              [id number]              N/A

 

                          [employer]    [employer]                 [id number]

                                                    

 

                    Medicare Part A    Medicare - Lab/Xray              [id number]              2006

 

                    Medicare Part B    Medicare [place]              [id number]              2006

 

                          [employer]    [employer]                 50 percent

                                                    2009

 

                    Human    [employer]              [id number]              N/A

 

                    Medicare Part A    Medicare Part A              [id number]              N/A

 

                    Medicare Part A    Medicare Part A              [id number]              2006









History of Encounters







                    Visit Date          Visit Type          Provider

 

                    2017           Office visit        Bhupinder Louise DO

 

                    2017           Nurse visit         Bhupinder Louise DO

 

                    2017           Office visit         

 

                    2017           Office visit        Bhupinder Louise DO

 

                    2017           Nurse visit         Bhupinder Louise DO

 

                    2017           Office visit        Radha Ontiveros APRN

 

                    1/15/2017           Office visit        Aj Tapia NP

 

                    2017            Office visit        Devin Masterson MD

 

                    2016          [facility]            Devin Masterson MD

 

                    12/15/2016          Nurse visit         Bhupinder Aspen DO

 

                    2016           Nurse visit         Bret Forte APRN

 

                    2016           Nurse visit         Bhupinder Aspen DO

 

                    2016            Office visit        Bhupinder Aspen DO

 

                    2016           Nurse visit         Bhupinder Aspen DO

 

                    2016           Office visit        Bret Forte APRN

 

                    2016            Office visit        Bhupinder Aspen DO

 

                    3/15/2016           Nurse visit         Bhupinder Aspen DO

 

                    2016            Nurse visit         Bhupinder Aspen DO

 

                    2015          Nurse visit         Bhupinder Aspen DO

 

                    12/3/2015           Office visit        Bhupinder Aspen DO

 

                    2015          Nurse visit         Bhupinder Aspen DO

 

                    2015           Office visit        Bhupinder Aspen DO

 

                    2015           Nurse visit         Bhupinder Aspen DO

 

                    2015            Nurse visit         Bhupinder Aspen DO

 

                    2015            Nurse visit         Bhupinder Aspen DO

 

                    2015           Office visit        Bhupinder Aspen DO

 

                    2015            Nurse visit         Bhupinder Aspen DO

 

                    3/23/2015           Office visit        Bhupinder Aspen DO

 

                    10/16/2014          Office visit        Bhupinder Aspen DO

 

                    2014           Nurse visit         Radha Weston APRN

 

                    7/10/2014           Nurse visit         Bhupinder Aspen DO

 

                    2014           Office visit        Bhupinder Aspen DO

 

                    2014           Nurse visit         Bhupinder Aspen DO

 

                    3/6/2014            Nurse visit         Bhupinder Aspen DO

 

                    2014            Yasmine Lopez MD

 

                    2013          Nurse visit         Bhupinder Aspen DO

 

                    2013           Nurse visit         Bhupinder Aspen DO

 

                    2013            Office visit        Bhupinder Aspen DO

 

                    2013            Nurse visit         Bhupinder Aspen DO

 

                    2013            Nurse visit         Bhupinder Aspen DO

 

                    2013            Office visit        Bhupinder Aspen DO

 

                    4/3/2013            Nurse visit         Bhupinder Aspen DO

 

                    2013            Office visit        Bhupinder Aspen DO

 

                    10/16/2012          Nurse visit         Bhupinder Aspen DO

 

                    2012            Nurse visit         Bhupinder Aspen DO

 

                    2012            Voided              Bhupinder Aspen DO

 

                    2012            Nurse visit         Bhupinder Aspen DO

 

                    2012            Nurse visit         Bhupinder Aspen DO

 

                    2012           Nurse visit         Bhupinder Aspen DO

 

                    5/3/2012            Nurse visit         Bhupinder Aspen DO

 

                    2012           Nurse visit         Bhupinder Aspen DO

 

                    2012           Nurse visit         Bhupinder Aspen DO

 

                    2012           Nurse visit         Bhupinder Aspen DO

 

                    2011           Nurse visit         Bhupinder Aspen DO

 

                    10/20/2011          Nurse visit         Bhupinder Aspen DO

 

                    2011           Office visit        Bhupinder Aspen DO

 

                    2011           Nurse visit         Radha Ontiveros APRN

 

                    2011            Office visit        Bhupinder Aspen DO

 

                    2011           Nurse visit         Bhupinder Aspen DO

 

                    2011            Office visit        Bhupinder Aspen DO

 

                    2011           Office visit        Bhupinder Aspen DO

 

                    5/10/2011           Office visit        Bhuipnder Aspen DO

 

                    2011           Office visit        Bhupinder Aspen DO

 

                    4/15/2011           Office visit        Devin Angel DO

 

                    2011           Office visit        Devin Angel DO

 

                    10/15/2010          Office visit        Devin Angel DO

 

                    2010           Office visit        Devin Angel DO

 

                    2010            Office visit        Devin Angel DO

 

                    2010           Office visit        Devin Angel DO

 

                    2010            Office visit        Devin Angel DO

 

                    3/8/2010            Office visit        Devin Masterson MD

 

                    2010            Surgery             Devin Masterson MD

 

                    2010            Office visit        Devin Angel DO

 

                    2010           Surgery             Devin Masterson MD

 

                    2010           Hospital            Devin Masterson MD

 

                    2010           [facility]            Devin Masterson MD

 

                    10/22/2009          Office visit        Devin Angel DO

## 2019-06-26 NOTE — XMS REPORT
MU2 Ambulatory Summary

                             Created on: 04/10/2017



Pauline Gan

External Reference #: [id number]

: 1950

Sex: Female



Demographics







                          Address                   [address]

[place]  [id number]

 

                          Home Phone                [phone]

 

                          Preferred Language        English

 

                          Marital Status            Legally 

 

                          Islam Affiliation     Unknown

 

                          Race                      White

 

                          Ethnic Group              Not  or 





Author







                          Author                    Bhupinder Louise

 

                          [facility]

 

                          Address                   [address]

[place]  [phone]



 

                          Phone                     [phone]







Care Team Providers







                    Care Team Member Name    Role                Phone

 

                    Bhupinder Louise    PCP                 Unavailable

 

                    Bhupinder Louise    PreferredProvider    Unavailable







Allergies and Adverse Reactions







                    Name                Reaction            Notes

 

                    NO KNOWN DRUG ALLERGIES                         







Plan of Treatment







             Planned Activity    Comments     Planned Date    Planned Time    Plan/Goal

 

             Swallowing evaluation                 2017    12:00 AM      







Medications







                                        Active 

 

             Name         Start Date    Estimated Completion Date    SIG          Comments

 

                Latuda 20 mg oral tablet                                    take 1 tablet (20 mg) by oral route once daily with

 food (at least 350 calories)            

 

             pravastatin 40 mg oral tablet    3/30/2015                 TAKE 1 TABLET BY MOUTH DAILY     

 

                Namenda XR 28 mg oral capsule,sprinkle,ER 24hr    2015                       take 1 capsule (28

 mg) by oral route once daily            

 

                Namenda XR 28 mg oral capsule,sprinkle,ER 24hr    2016                       take 1 capsule (28

 mg) by oral route once daily            

 

                potassium chloride 10 mEq oral tablet extended release    2016                       take 1 tablet

 (10 meq) by oral route once daily       

 

             pravastatin 40 mg oral tablet    2016                 TAKE 1 TABLET BY MOUTH DAILY     

 

                Vitamin B-12 1,000 mcg/mL injection solution    2016                       inject 1 milliliter 

(1,000 mcg) by intramuscular route once a month     

 

                potassium chloride 10 mEq oral tablet extended release    2016                      take 1 tablet

 (10 meq) by oral route once daily       

 

                Namenda XR 28 mg oral capsule,sprinkle,ER 24hr    2016                      TAKE 1 CAPSULE BY

 MOUTH EVERY DAY                         

 

                furosemide 40 mg oral tablet    2016                      take 1 tablet (40 mg) by oral route

 once daily                              

 

                mirtazapine 45 mg oral tablet                                    take 1 tablet (45 mg) by oral route once daily

 before bedtime                          

 

             Fish Oil 300-1,000 mg oral capsule                              take 1 capsule by oral route daily     

 

             Vitamin D3 1,000 unit oral tablet                              take 1 tablet by oral route daily     

 

                Calcium 600 600 mg calcium (1,500 mg) oral tablet                                    take 1 tablet by oral route

 daily                                   

 

                Aspirin Low Dose 81 mg oral tablet,delayed release (DR/EC)                                    take 1 tablet 

(81 mg) by oral route once daily         

 

                amoxicillin 500 mg oral tablet    2017       take 1 tablet (500 mg) by oral

 route every 12 hours for 7 days         









                                         

 

             Name         Start Date    Expiration Date    SIG          Comments

 

             Reglan 10mg    3/29/2010    2010    one ac and hs     

 

                Keflex 500 mg oral capsule    2010       10/1/2010       take 1 capsule (500 mg) by oral

 route every 6 hours for 10 days         

 

                Bactrim -160 mg oral tablet    2011       take 1 tablet by oral route

 every 12 hours for 7 days               

 

                triamcinolone acetonide 0.1 % topical cream    2011      apply a thin

 layer to the affected area(s) by topical route 2 times per day     

 

                sertraline 100 mg oral tablet    4/10/2012       5/10/2012       take 1.5 tablets by oral route

 daily for 30 days                       

 

                ergocalciferol (vitamin D2) 50,000 unit oral capsule    4/15/2013       2013       TAKE

 ONE CAPSULE BY MOUTH ONCE A WEEK        

 

                CYANOCOBALAM 1000MCGINJ 1000 milliliter    2013       INJECT 1ML INTRAMUSCULAR

 ONCE A MONTH                            

 

                pravastatin 40 mg oral tablet    3/25/2014       3/20/2015       TAKE ONE TABLET BY MOUTH EVERY

 DAY                                     

 

                          Zostavax (PF) 19,400 unit/0.65 mL subcutaneous suspension for reconstitution    3/23/2015

                    3/24/2015           inject 0.65 milliliter by subcutaneous route once     

 

                famciclovir 500 mg oral tablet    12/3/2015       12/10/2015      take 1 tablet (500 mg) by

 oral route every 8 hours for 7 days     

 

                furosemide 40 mg oral tablet    2016      take 1 tablet (40 mg) by oral

 route once daily                        

 

                Cipro 500 mg oral tablet    2016       take 1 tablet (500 mg) by oral route

 2 times per day for 5 days              

 

                Bactrim -160 mg oral tablet    2016        take 1 tablet by oral route

 every 12 hours for 7 days               

 

                metoclopramide HCl 10 mg oral tablet    2017       take 1 tablet by oral

 route 2 times a day for 50 days         

 

                Macrobid 100 mg oral capsule    2017       take 1 capsule (100 mg) by oral

 route 2 times per day with food for 7 days     

 

                Augmentin 875-125 mg oral tablet    2017       take 1 tablet by oral route

 every 12 hours for 7 days               









                                        Discontinued 

 

             Name         Start Date    Discontinued Date    SIG          Comments

 

                Tylenol 325 mg oral tablet                    2013        take 1 - 2 tablets (325 -650 mg) by oral

 route every 4-6 hours as needed         

 

                Calcium 600 + D(3) 600 mg(1,500mg) -400 unit oral tablet                    2011       take 1 tablet

 by oral route 2 times a day            no longer taking

 

                Vitamin B-12 1,000 mcg oral tablet extended release    2010       take 1

 tablet by oral route daily             no longer taking

 

                Antifungal (clotrimazole) 1 % topical cream    2010       apply to the 

affected and surrounding areas of skin by topical route 2 times per day morning 
and evening                              

 

                sertraline 100 mg oral tablet    5/10/2011       2011       take 2 tablets (200 mg) by 

oral route once daily                   discontinued by Dr. Serrano

 

                mirtazapine 15 mg oral tablet                    2011        take 1 tablet (15 mg) by oral route 

once daily before bedtime               Dr. Serrano

 

                mirtazapine 15 mg oral tablet                    2011        take 1 tablet (15 mg) by oral route 

once daily before bedtime               dc'd by Dr. Serrano

 

                Pristiq 50 mg oral tablet extended release 24 hr                    2013        take 1 tablet (50

 mg) by oral route once daily           Dr. Serrano

 

                Pristiq 50 mg oral tablet extended release 24 hr                    2013        take 1 tablet (50

 mg) by oral route once daily           dose updated

 

                Vitamin B-12 1,000 mcg/mL injection solution    2011        inject 1 milliliter

 (1,000 mcg) by intramuscular route once a month    on list already

 

                    syringe with needle 1 mL 25 gauge x 1" miscellaneous syringe    2011

                          use for injection once a month     

 

                clotrimazole 1 % topical cream    2011        apply to the affected and surrounding

 areas of skin by topical route 2 times per day in the morning and evening     

 

                Vitamin D2 50,000 unit oral capsule    2011        take 1 capsule (50,000

 unit) by oral route once weekly        generic on list

 

                Pravachol 40 mg oral tablet    2012        take 1 tablet (40 mg) by oral 

route once daily for 90 days            generic on list

 

                lithium carbonate 300 mg oral capsule    2012        take 1 capsule by oral

 route daily                            dose updated

 

                Pristiq 100 mg oral tablet extended release 24 hr                    4/10/2012       take 1 and 1/2 

tablet (150 mg) by oral route once daily    Mental Health provider

 

                Pristiq 100 mg oral tablet extended release 24 hr                    4/10/2012       take 1 and 1/2 

tablet (150 mg) by oral route once daily    Discontinued by Dr Efrain Knight at [facility]

 

                hydroxyzine HCl 50 mg oral tablet    10/16/2014      2015       take 1 tablet (50 mg) 

by oral route at bedtime                 

 

                lithium carbonate 300 mg oral capsule    2015       take 1 capsule (300

 mg) by oral route 2 for 30 days         

 

                fluconazole 100 mg oral tablet    2015       12/3/2015       take 1 tablet (100 mg) by 

oral route once a week                   

 

                ketoconazole 2 % topical cream    2015       12/3/2015       apply to the affected area(s)

 by topical route 2 times per day        

 

                prednisone 10 mg oral tablet    12/3/2015       2016        take 2 tablets (20 mg) by oral

 route once daily for 4 days 1 tablet daily for 4 days 0.5 tablet daily for 4 
days                                     

 

                triamcinolone acetonide 0.1 % topical cream    2016       apply a thin layer

 to the affected area(s) by topical route 2 times per day     

 

                Cipro 500 mg oral tablet    1/15/2017       2017       take 1 tablet (500 mg) by oral route

 every 12 hours for 10 days              







Problem List







                    Description         Status              Onset

 

                    Artificial opening status; colostomy    Active               

 

                    Bipolar disorder, unspecified    Active               

 

                    Hyperlipidemia      Active               

 

                    Peritoneal Neoplasm, Malignant    Active               

 

                    Anemia, Pernicious    Active               

 

                    Arthritis unspecified    Active               

 

                    B12 deficiency      Active               







Vital Signs







      Date    Time    BP-Sys(mm[Hg]    BP-Lynn(mm[Hg])    HR(bpm)    RR(rpm)    Temp    WT    HT    HC    BMI

                    BSA                 BMI Percentile      O2 Sat(%)

 

        2017    3:05:00 PM    134 mmHg    70 mmHg    70 bpm    20 rpm    97.4 F    181 lbs    69 in

                          26.73 kg/m2    2.00 m2                   98 %

 

        2017    11:07:00 AM    124 mmHg    64 mmHg    62 bpm    17 rpm    98.2 F    181.2 lbs    69

 in                       26.7583 kg/m    2.0003 m                 98 %

 

        1/15/2017    3:34:00 PM    148 mmHg    89 mmHg    69 bpm    20 rpm    98.2 F    179 lbs    69 in

                          26.43 kg/m2    1.99 m2                   98 %

 

       2017    1:51:00 PM    160 mmHg    90 mmHg    100 bpm    20 rpm    96.5 F    179 lbs             

                                                                98 %

 

       2016    3:11:00 PM    134 mmHg    76 mmHg    80 bpm    20 rpm    98 F    163 lbs    69 in     

                24.07 kg/m2     1.90 m2                         98 %

 

        2016    2:04:00 PM    142 mmHg    86 mmHg    68 bpm    16 rpm    98.5 F    166 lbs    63 in

                          29.4053 kg/m    1.8295 m                 100 %

 

        2016    11:27:00 AM    148 mmHg    78 mmHg    90 bpm    20 rpm    98.2 F    153 lbs    69 in

                          22.59 kg/m2    1.84 m2                   96 %

 

        12/3/2015    9:50:00 AM    132 mmHg    70 mmHg    62 bpm    16 rpm    97.9 F    145 lbs    69 in

                          21.4125 kg/m    1.7894 m                 100 %

 

        2015    8:52:00 AM    132 mmHg    68 mmHg    52 bpm    20 rpm    97.8 F    141 lbs    69 in

                          20.82 kg/m2    1.76 m2                   100 %

 

        2015    3:25:00 PM    120 mmHg    62 mmHg    72 bpm    16 rpm    98.1 F    136 lbs    69 in

                          20.0835 kg/m    1.733 m                 98 %

 

       3/23/2015    2:55:00 PM    130 mmHg    76 mmHg    68 bpm    18 rpm    97 F    140 lbs    69 in    

                20.67 kg/m2     1.76 m2                         98 %

 

        10/16/2014    11:11:00 AM    120 mmHg    66 mmHg    77 bpm    20 rpm    98 F    130 lbs    69 in

                          19.1974 kg/m    1.6943 m                 100 %

 

        2014    3:21:00 PM    130 mmHg    66 mmHg    63 bpm    18 rpm    97.2 F    160 lbs    69 in

                          23.6276 kg/m    1.88 m2                   99 %

 

        2013    10:35:00 AM    132 mmHg    70 mmHg    66 bpm    20 rpm    98.1 F    157 lbs    69 in

                          23.18 kg/m2    1.862 m                  

 

        2013    1:29:00 PM    132 mmHg    70 mmHg    76 bpm    18 rpm    98.2 F    166 lbs    69 in 

                          24.5137 kg/m    1.91 m2                    

 

       2013    2:46:00 PM    128 mmHg    70 mmHg    76 bpm    16 rpm    98 F    160 lbs    69 in     

                23.63 kg/m2     1.8797 m                       

 

        2011    8:49:00 AM    128 mmHg    78 mmHg    70 bpm    18 rpm    97.9 F    164 lbs    69 in

                          24.2183 kg/m    1.90 m2                    

 

     2011    1:31:00 PM    132 mmHg    68 mmHg    84 bpm         97 F    167 lbs                        

                                         

 

        2011    9:09:00 AM    128 mmHg    70 mmHg    72 bpm    18 rpm    98.2 F    163 lbs    64 in 

                          27.9786 kg/m    1.83 m2                    

 

       2011    10:01:00 AM    132 mmHg    70 mmHg    72 bpm    18 rpm    98.2 F    154 lbs             

                                                                 

 

       2011    2:47:00 PM    128 mmHg    70 mmHg    72 bpm    18 rpm    97.8 F    156 lbs             

                                                                 

 

       5/10/2011    3:16:00 PM    144 mmHg    80 mmHg    72 bpm    18 rpm    98.2 F    158 lbs             

                                                                 

 

        2011    10:11:00 AM    132 mmHg    70 mmHg    70 bpm    18 rpm    98.2 F    168 lbs    69 in

                          24.809 kg/m    1.93 m2                    

 

        4/15/2011    10:52:00 AM    110 mmHg    60 mmHg    75 bpm    16 rpm    97.5 F    172.375 lbs    

69 in                     25.46 kg/m2    1.951 m                 100 %

 

        2011    11:43:00 AM    120 mmHg    82 mmHg    75 bpm    16 rpm    97.2 F    178.5 lbs    69

 in                       26.3596 kg/m    1.99 m2                   100 %

 

        10/15/2010    1:32:00 PM    120 mmHg    70 mmHg    80 bpm    18 rpm    96.6 F    177 lbs    69 in

                          26.14 kg/m2    1.977 m                 100 %

 

        2010    3:50:00 PM    168 mmHg    100 mmHg    82 bpm    18 rpm    97.8 F    177.5 lbs    69

 in                       26.2119 kg/m    1.98 m2                   97 %

 

        2010    1:21:00 PM    140 mmHg    80 mmHg    59 bpm    16 rpm    97.6 F    173.25 lbs    69 

in                        25.58 kg/m2    1.956 m                 100 %

 

        2010    3:02:00 PM    140 mmHg    80 mmHg    61 bpm    16 rpm    97.6 F    173.125 lbs    69

 in                       25.5658 kg/m    1.96 m2                   99 %

 

        2010    1:23:00 PM    130 mmHg    80 mmHg    66 bpm    16 rpm    96.8 F    173 lbs    69 in 

                          25.55 kg/m2    1.9546 m                 100 %

 

        2010    12:58:00 PM    130 mmHg    88 mmHg    75 bpm    16 rpm    98.4 F    172.25 lbs    69

 in                       25.4366 kg/m    1.95 m2                   100 %







Social History







                    Name                Description         Comments

 

                    denies alcohol use                         

 

                    denies smoking                           

 

                    Denies illicit substance abuse                         

 

                    retired                                 direct care

 

                    Single                                   

 

                    Exercises regularly                         

 

                    Attended some college                         

 

                    Tobacco             Never smoker         







History of Procedures







                    Date Ordered        Description         Order Status

 

                    2010 12:00 AM    COMPREHEN METABOLIC PANEL    Reviewed

 

                    2010 12:00 AM    COMPLETE CBC W/AUTO DIFF WBC    Reviewed

 

                    2010 12:00 AM    LIPID PANEL         Reviewed

 

                          2015 12:00 AM        B12 Injection, Up to 1000 Mcg NDC#4462-1596-74 [facility] 

                                        Reviewed

 

                    2011 12:00 AM    MAMMOGRAM SCREENING    Reviewed

 

                    2011 12:00 AM    CYTOPATH C/V THIN LAYER    Reviewed

 

                    2011 12:00 AM    B12 Injection 1 cc NDC#[address]-51    Reviewed

 

                    2015 12:00 AM    THER/PROPH/DIAG INJ SC/IM    Reviewed

 

                    2015 12:00 AM    B12 Injection, Up to 1000 Mcg NDC#8226-4289-78    Reviewed

 

                    2011 12:00 AM    THER/PROPH/DIAG INJ SC/IM    Reviewed

 

                    2011 12:00 AM    B12 Injection([facility]) Ndc#4573-3533-20-    Reviewed

 

                    2015 12:00 AM    THER/PROPH/DIAG INJ SC/IM    Reviewed

 

                    2015 12:00 AM    B12 Injection, Up to 1000 Mcg NDC#3663-4618-22    Reviewed

 

                    10/20/2011 12:00 AM    THER/PROPH/DIAG INJ SC/IM    Reviewed

 

                    10/20/2011 12:00 AM    B12 Injection([place]) Ndc#9939-8530-85-    Reviewed

 

                    2016 12:00 AM    THER/PROPH/DIAG INJ SC/IM    Reviewed

 

                    2016 12:00 AM    B12 Injection, Up to 1000 Mcg NDC#5822-4794-04    Reviewed

 

                    3/14/2016 12:00 AM    VITAMIN B-12        Reviewed

 

                    3/15/2016 12:00 AM    THER/PROPH/DIAG INJ SC/IM    Reviewed

 

                    3/15/2016 12:00 AM    B12 Injection, Up to 1000 Mcg NDC#4937-7435-11    Reviewed

 

                    2011 12:00 AM    ***Immunization administration, Medicare flu    Reviewed

 

                    2011 12:00 AM    Fluzone ** MEDICARE Only **    Reviewed

 

                    2011 12:00 AM    THER/PROPH/DIAG INJ SC/IM    Reviewed

 

                    2011 12:00 AM    B12 Injection ([employer] [facility]) Ndc#6023-9713-99    Reviewed

 

                    2016 12:00 AM    B12 Injection, Up to 1000 Mcg NDC#1684-5302-60 [facility]    

Reviewed

 

                    2016 12:00 AM    TTE W/DOPPLER COMPLETE    Reviewed

 

                    2016 12:00 AM    EXTREMITY STUDY     Reviewed

 

                          2016 12:00 AM        B12 Injection, Up to 1000 Mcg NDC#6725-4330-64 [facility] 

                                        Reviewed

 

                    2016 12:00 AM    THER/PROPH/DIAG INJ SC/IM    Reviewed

 

                    2016 12:00 AM    B12 Injection, Up to 1000 Mcg NDC#1570-8888-80    Reviewed

 

                    2016 12:00 AM    THER/PROPH/DIAG INJ SC/IM    Reviewed

 

                    2012 12:00 AM    B12 Injection([place]) Ndc#1001-5361-87-    Reviewed

 

                    2016 12:00 AM    B12 Injection, Up to 1000 Mcg NDC#1516-5937-80    Reviewed

 

                    2016 12:00 AM    THER/PROPH/DIAG INJ SC/IM    Reviewed

 

                    2012 12:00 AM    THER/PROPH/DIAG INJ SC/IM    Reviewed

 

                    2012 12:00 AM    B12 Injection ([employer]) Ndc#1816-4437-91    Reviewed

 

                    2016 12:00 AM    THER/PROPH/DIAG INJ SC/IM    Reviewed

 

                    2016 12:00 AM    B12 Injection, Up to 1000 Mcg NDC#1086-6084-66    Reviewed

 

                    2016 12:00 AM    B12 Injection, Up to 1000 Mcg NDC#5659-9210-05    Reviewed

 

                    2016 12:00 AM    THER/PROPH/DIAG INJ SC/IM    Reviewed

 

                    2012 12:00 AM    THER/PROPH/DIAG INJ SC/IM    Reviewed

 

                    2012 12:00 AM    B12 Injection([place]) Ndc#6882-2714-08-    Reviewed

 

                    12/15/2016 12:00 AM    B12 Injection, Up to 1000 Mcg NDC#3808-5183-03    Reviewed

 

                    12/15/2016 12:00 AM    THER/PROPH/DIAG INJ SC/IM    Reviewed

 

                    2016 12:00 AM    URNLS DIP STICK/TABLET RGNT AUTO W/O MICROSCOPY    Returned

 

                    1/3/2017 12:00 AM    URNLS DIP STICK/TABLET RGNT AUTO W/O MICROSCOPY    Returned

 

                    2017 12:00 AM    URINE CULTURE/COLONY COUNT    Returned

 

                    2017 12:00 AM    Rocephin 1 gram Injection, [facility]    Reviewed

 

                    2017 12:00 AM    THERAPEUTIC PROPHYLACTIC/DX INJECTION SUBQ/IM    Reviewed

 

                    2017 12:00 AM    B12 1000mcg Injection, [facility]    Reviewed

 

                    5/3/2012 12:00 AM    THER/PROPH/DIAG INJ SC/IM    Reviewed

 

                    5/3/2012 12:00 AM    B12 Injection([place]) Ndc#8253-5455-86-    Reviewed

 

                    2017 12:00 AM    THERAPEUTIC PROPHYLACTIC/DX INJECTION SUBQ/IM    Reviewed

 

                    2017 12:00 AM    B12 1000mcg Injection, [facility]    Reviewed

 

                    2017 12:00 AM    MRI BRAIN STEM W/O & W/DYE    Reviewed

 

                    2017 12:00 AM    VITAMIN B-12        Reviewed

 

                    2017 12:00 AM    Speech Therapy Consult    Reviewed

 

                    2017 12:00 AM    ASSAY OF CREATININE    Reviewed

 

                    2012 12:00 AM    IMMUNOTHERAPY INJECTIONS    Reviewed

 

                    2012 12:00 AM    B12 Injection([place]) Ndc#4684-9792-00-    Reviewed

 

                    2012 12:00 AM    THER/PROPH/DIAG INJ SC/IM    Reviewed

 

                    2012 12:00 AM    B12 Injection, Up to 1000 Mcg NDC#1561-1773-15    Reviewed

 

                    2012 12:00 AM    THER/PROPH/DIAG INJ SC/IM    Reviewed

 

                    2012 12:00 AM    B12 Injection, Up to 1000 Mcg NDC#9815-2330-53    Reviewed

 

                    2012 12:00 AM    THER/PROPH/DIAG INJ SC/IM    Reviewed

 

                    2012 12:00 AM    B12 Injection, Up to 1000 Mcg NDC#1854-4890-37    Reviewed

 

                    10/16/2012 12:00 AM    THER/PROPH/DIAG INJ SC/IM    Reviewed

 

                    10/16/2012 12:00 AM    B12 Injection, Up to 1000 Mcg NDC#2816-8230-20    Reviewed

 

                    2010 12:00 AM    COMPREHEN METABOLIC PANEL    Reviewed

 

                    2010 12:00 AM    COMPLETE CBC W/AUTO DIFF WBC    Reviewed

 

                    2010 12:00 AM    LIPID PANEL         Reviewed

 

                    2013 12:00 AM    Flu Injection 3 Years And Above NDC# [address]-63  [facility]    Reviewed



 

                    2013 12:00 AM    COMPLETE CBC W/AUTO DIFF WBC    Reviewed

 

                    2013 12:00 AM    ASSAY OF LITHIUM    Reviewed

 

                    2013 12:00 AM    METABOLIC PANEL TOTAL CA    Reviewed

 

                    4/3/2013 12:00 AM    THER/PROPH/DIAG INJ SC/IM    Reviewed

 

                    4/3/2013 12:00 AM    B12 Injection, Up to 1000 Mcg NDC#4610-9216-03    Reviewed

 

                    2013 12:00 AM    THER/PROPH/DIAG INJ SC/IM    Reviewed

 

                    2013 12:00 AM    B12 Injection, Up to 1000 Mcg NDC#1030-8834-61    Reviewed

 

                    2013 12:00 AM    THER/PROPH/DIAG INJ SC/IM    Reviewed

 

                    2013 12:00 AM    B12 Injection, Up to 1000 Mcg NDC#5872-2459-58    Reviewed

 

                    2013 12:00 AM    LIPID PANEL         Reviewed

 

                    2013 12:00 AM    VITAMIN D 25 HYDROXY    Reviewed

 

                    2013 12:00 AM    THER/PROPH/DIAG INJ SC/IM    Reviewed

 

                    2013 12:00 AM    B12 Injection, Up to 1000 Mcg NDC#8740-8464-15    Reviewed

 

                    2013 12:00 AM    THER/PROPH/DIAG INJ SC/IM    Reviewed

 

                    3/6/2014 12:00 AM    THER/PROPH/DIAG INJ SC/IM    Reviewed

 

                    2014 12:00 AM    THER/PROPH/DIAG INJ SC/IM    Reviewed

 

                    2014 12:00 AM    B12 Injection, Up to 1000 Mcg NDC#8710-4434-42    Reviewed

 

                    2010 12:00 AM    SKIN FUNGI CULTURE    Reviewed

 

                    10/9/2010 12:00 AM    COMPREHEN METABOLIC PANEL    Reviewed

 

                    10/9/2010 12:00 AM    LIPID PANEL         Reviewed

 

                    2010 12:00 AM    THER/PROPH/DIAG INJ SC/IM    Reviewed

 

                    2010 12:00 AM    B12 Injection Ndc#[address]-94 ([facility])    Reviewed

 

                    2010 12:00 AM    THER/PROPH/DIAG INJ SC/IM    Reviewed

 

                    2010 12:00 AM    Kenalog 40 Mg Im-Ndc#[address]-78 ([facility])    Reviewed

 

                    10/15/2010 12:00 AM    FLU VACCINE 3 YRS & > IM    Reviewed

 

                    10/15/2010 12:00 AM    Admin.Of M/C Cov.Vaccine-Flu Vacc.    Reviewed

 

                    1/15/2011 12:00 AM    COMPLETE CBC W/AUTO DIFF WBC    Reviewed

 

                    1/15/2011 12:00 AM    COMPREHEN METABOLIC PANEL    Reviewed

 

                    1/15/2011 12:00 AM    LIPID PANEL         Reviewed

 

                    2014 12:00 AM    MAMMOGRAM SCREENING    Reviewed

 

                    2014 12:00 AM    Screening mammography, bilateral    Reviewed

 

                    7/10/2014 12:00 AM    THER/PROPH/DIAG INJ SC/IM    Reviewed

 

                    7/10/2014 12:00 AM    B12 Injection, Up to 1000 Mcg NDC#6299-4080-81    Reviewed

 

                    2011 12:00 AM    COMPLETE CBC W/AUTO DIFF WBC    Reviewed

 

                    2011 12:00 AM    COMPREHEN METABOLIC PANEL    Reviewed

 

                    2011 12:00 AM    LIPID PANEL         Reviewed

 

                    2014 12:00 AM    B12 Injection, Up to 1000 Mcg NDC#3227-7620-00    Reviewed

 

                    10/19/2014 12:00 AM    MAMMOGRAM SCREENING    Reviewed

 

                    10/19/2014 12:00 AM    Screening mammography, bilateral    Reviewed

 

                    10/16/2014 12:00 AM    B12 Injection, Up to 1000 Mcg NDC#9433-3184-66    Reviewed

 

                    10/16/2014 12:00 AM    COMPLETE CBC W/AUTO DIFF WBC    Reviewed

 

                    10/16/2014 12:00 AM    COMPREHEN METABOLIC PANEL    Reviewed

 

                    10/16/2014 12:00 AM    IMMUNOASSAY TUMOR     Reviewed

 

                    10/16/2014 12:00 AM    LIPID PANEL         Reviewed

 

                    10/16/2014 12:00 AM    ASSAY OF LITHIUM    Reviewed

 

                    10/16/2014 12:00 AM    MAMMOGRAM SCREENING    Reviewed

 

                    2011 12:00 AM    ASSAY OF PARATHORMONE    Reviewed

 

                    2011 12:00 AM    VITAMIN D 25 HYDROXY    Reviewed

 

                    2011 12:00 AM    ASSAY OF LITHIUM    Reviewed

 

                    2011 12:00 AM    METABOLIC PANEL TOTAL CA    Reviewed

 

                    2011 12:00 AM    CT HEAD/BRAIN W/O & W/DYE    Reviewed

 

                    3/23/2015 12:00 AM    PNEUMOCOCCAL VACC 13 GLENDY IM    Reviewed

 

                    3/23/2015 12:00 AM    Vitamin B12 injection    Reviewed

 

                    2011 12:00 AM    ASSAY OF LITHIUM    Reviewed

 

                    2011 12:00 AM    B12 Injection Ndc#[address]-39  [facility]    Reviewed

 

                    2015 12:00 AM    THER/PROPH/DIAG INJ SC/IM    Reviewed

 

                    2015 12:00 AM    B12 Injection, Up to 1000 Mcg NDC#9238-2902-19    Reviewed

 

                    2015 12:00 AM    COMPLETE CBC W/AUTO DIFF WBC    Reviewed

 

                    2015 12:00 AM    COMPREHEN METABOLIC PANEL    Reviewed

 

                    2015 12:00 AM    LIPID PANEL         Reviewed

 

                    2015 12:00 AM    ASSAY OF LITHIUM    Reviewed

 

                    2011 12:00 AM    VIT D 1 25-DIHYDROXY    Reviewed

 

                    2011 12:00 AM    VITAMIN B-12        Reviewed

 

                    2015 12:00 AM    B12 Injection, Up to 1000 Mcg NDC#4436-8184-91    Reviewed

 

                    2015 12:00 AM    THER/PROPH/DIAG INJ SC/IM    Reviewed

 

                    2015 12:00 AM    B12 Injection, Up to 1000 Mcg NDC#9765-0908-13    Reviewed

 

                    2011 12:00 AM    THER/PROPH/DIAG INJ SC/IM    Reviewed

 

                    2011 12:00 AM    B12 Injection ([employer] [facility]) Ndc#2785-6479-94    Reviewed

 

                    2015 12:00 AM    THER/PROPH/DIAG INJ SC/IM    Reviewed

 

                    2015 12:00 AM    B12 Injection, Up to 1000 Mcg NDC#4412-9632-08    Reviewed







Results Summary







                          Data and Description      Results

 

                          2004 12:00 AM        Colonoscopy-Women and Men over 50 Normal 

 

                          2008 12:00 AM         Pap Smear Declined 

 

                          10/7/2009 12:00 AM        Cholest Cry Stone Ql .0 %LDLc SerPl-mCnc 123.0 mg/dLHDLc

 SerPl-mCnc 34.0 mg/dLTrigl SerPl-mCnc 190.0 mg/dLGlucose SerPl-mCnc 78.0 mg/dL

 

                          2009 12:00 AM        Mammogram -Women over 40 Normal HIV1+2 Ab Ser Ql no risk 

 

                          2010 8:47 AM         Dexa Bone Scan Refused Aspirin reccommended Contraindication 



 

                          2010 8:48 AM         Depression Done 

 

                          2010 12:00 AM         Foot Exam-Diabetic Done 

 

                          2010 12:00 AM         Cholest Cry Stone Ql .0 %LDLc SerPl-mCnc 126.0 mg/dLGlucose

 SerPl-mCnc 102.0 mg/dL

 

                          2010 8:45 AM          TRIGLYCERIDES 122.0 mg/dLCHOLESTEROL 186.0 mg/dLHDL 36.0 mg/dLTOT

 CHOL/HDL 5.2 LDL (CALC) 126.0 mg/dLGLUCOSE 102.0 mg/dLSODIUM 143.0 
mmol/LPOTASSIUM 3.70 mmol/LCHLORIDE 111.0 mmol/LCO2 23.0 mmol/LBUN 10.0 
mg/dLCREATININE 0.80 mg/dLSGOT/AST 12.0 IU/LSGPT/ALT 11.0 IU/LALK PHOS 65.0 
IU/LTOTAL PROTEIN 7.20 g/dLALBUMIN 3.90 g/dLTOTAL BILI 0.50 mg/dLCALCIUM 10.20 
mg/dLAGE 59 GFR NonAA 73 GFR AA 88 eGFR >60 mL/min/1.73 m2eGFR AA* >60 WBC 5.7 
RBC 3.26 HGB 10.60 g/dLHCT 31.70 %MCV 97.0 fLMCH 32.50 pgMCHC 33.40 g/dLRDW SD 
47 RDW CV 13.30 %MPV 9.70 fLPLT 287 NRBC# 0.00 NRBC% 0.0 %NEUT 62.90 %%LYMP 
21.80 %%MONO 9.90 %%EOS 5.0 %%BASO 0.40 %#NEUT 3.56 #LYMP 1.23 #MONO 0.56 #EOS 
0.28 #BASO 0.02 MANUAL DIFF NOT IND 

 

                          2010 12:00 AM        Glucose SerPl-mCnc 96.0 mg/dLCholest Cry Stone Ql .0 %LDLc

 SerPl-mCnc 146.0 mg/dL

 

                          2010 8:26 AM         TRIGLYCERIDES 106.0 mg/dLCHOLESTEROL 199.0 mg/dLHDL 32.0 mg/dLTOT

 CHOL/HDL 6.2 LDL (CALC) 146.0 mg/dLGLUCOSE 96.0 mg/dLSODIUM 143.0 
mmol/LPOTASSIUM 4.0 mmol/LCHLORIDE 113.0 mmol/LCO2 24.0 mmol/LBUN 13.0 
mg/dLCREATININE 1.0 mg/dLSGOT/AST 11.0 IU/LSGPT/ALT 6.0 IU/LALK PHOS 56.0 
IU/LTOTAL PROTEIN 6.60 g/dLALBUMIN 3.80 g/dLTOTAL BILI 0.50 mg/dLCALCIUM 9.30 
mg/dLAGE 59 GFR NonAA 57 GFR AA 69 eGFR 57 eGFR AA* >60 

 

                          10/6/2010 12:00 AM        Cholest Cry Stone Ql .0 %LDLc SerPl-mCnc 111.0 mg/dLGlucose

 SerPl-mCnc 81.0 mg/dL

 

                          10/6/2010 2:45 PM         TRIGLYCERIDES 123.0 mg/dLCHOLESTEROL 178.0 mg/dLHDL 42.0 mg/dLTOT

 CHOL/HDL 4.2 LDL (CALC) 111.0 mg/dLGLUCOSE 81.0 mg/dLSODIUM 139.0 
mmol/LPOTASSIUM 4.10 mmol/LCHLORIDE 106.0 mmol/LCO2 24.0 mmol/LBUN 13.0 
mg/dLCREATININE 0.90 mg/dLSGOT/AST 13.0 IU/LSGPT/ALT 11.0 IU/LALK PHOS 61.0 
IU/LTOTAL PROTEIN 7.10 g/dLALBUMIN 3.90 g/dLTOTAL BILI 0.30 mg/dLCALCIUM 9.30 
mg/dLAGE 60 GFR NonAA 64 GFR AA 78 eGFR >60 mL/min/1.73 m2eGFR AA* >60 WBC 6.9 
RBC 3.59 HGB 11.50 g/dLHCT 35.30 %MCV 98.0 fLMCH 32.0 pgMCHC 32.60 g/dLRDW SD 46
 RDW CV 12.90 %MPV 9.90 fLPLT 311 NRBC# 0.00 NRBC% 0.0 %NEUT 64.90 %%LYMP 22.50 
%%MONO 7.20 %%EOS 5.10 %%BASO 0.30 %#NEUT 4.45 #LYMP 1.54 #MONO 0.49 #EOS 0.35 
#BASO 0.02 MANUAL DIFF NOT IND 

 

                          2011 12:00 AM         Mammogram -Women over 40 Ordered 

 

                          2011 10:25 AM        TRIGLYCERIDES 111.0 mg/dLCHOLESTEROL 195.0 mg/dLHDL 43.0 mg/dLTOT

 CHOL/HDL 4.5 LDL (CALC) 130.0 mg/dLWBC 5.3 RBC 3.76 HGB 12.0 g/dLHCT 37.80 %MCV
 101.0 fLMCH 31.90 pgMCHC 31.70 g/dLRDW SD 47 RDW CV 13.0 %MPV 9.70 fLPLT 259 
NRBC# 0.00 NRBC% 0.0 %NEUT 69.0 %%LYMP 17.60 %%MONO 8.30 %%EOS 4.70 %%BASO 0.40 
%#NEUT 3.63 #LYMP 0.93 #MONO 0.44 #EOS 0.25 #BASO 0.02 MANUAL DIFF NOT IND 
GLUCOSE 102.0 mg/dLSODIUM 146.0 mmol/LPOTASSIUM 4.20 mmol/LCHLORIDE 113.0 
mmol/LCO2 23.0 mmol/LBUN 15.0 mg/dLCREATININE 1.0 mg/dLSGOT/AST 12.0 
IU/LSGPT/ALT 17.0 IU/LALK PHOS 60.0 IU/LTOTAL PROTEIN 6.90 g/dLALBUMIN 4.20 
g/dLTOTAL BILI 0.40 mg/dLCALCIUM 9.70 mg/dLAGE 60 GFR NonAA 57 GFR AA 69 eGFR 57
 eGFR AA* >60 

 

                          2011 11:49 AM        Cholest Cry Stone Ql .0 %LDLc SerPl-mCnc 130.0 mg/dLHDLc

 SerPl-mCnc 43.0 mg/dLTrigl SerPl-mCnc 111.0 mg/dLGlucose SerPl-mCnc 102.0 mg/dL

 

                          2011 11:52 AM        Pap Smear Declined 

 

                          2011 11:28 AM        Lithium 2.080 mmol/LGLUCOSE 102.0 mg/dLSODIUM 135.0 mmol/LPOTASSIUM

 3.90 mmol/LCHLORIDE 106.0 mmol/LCO2 21.0 mmol/LBUN 12.0 mg/dLCREATININE 1.30 
mg/dLCALCIUM 10.70 mg/dLAGE 60 GFR NonAA 42 GFR AA 51 eGFR 42 eGFR AA* 51 

 

                          2011 8:58 AM          Lithium 0.690 mmol/L

 

                          2011 2:38 PM         VITAMIN B12 3483.0 pg/mL

 

                          2013 3:35 PM          WBC 5.1 RBC 3.73 HGB 11.70 g/dLHCT 36.40 %MCV 98.0 fLMCH 31.40

 pgMCHC 32.10 g/dLRDW SD 47 RDW CV 13.10 %MPV 9.80 fLPLT 224 NRBC# 0.00 NRBC% 
0.0 %NEUT 66.80 %%LYMP 19.10 %%MONO 9.0 %%EOS 4.90 %%BASO 0.20 %#NEUT 3.42 #LYMP
 0.98 #MONO 0.46 #EOS 0.25 #BASO 0.01 MANUAL DIFF NOT IND GLUCOSE 88.0 
mg/dLSODIUM 141.0 mmol/LPOTASSIUM 4.10 mmol/LCHLORIDE 110.0 mmol/LCO2 22.0 
mmol/LBUN 22.0 mg/dLCREATININE 1.10 mg/dLCALCIUM 9.80 mg/dLAGE 62 GFR NonAA 50 
GFR AA 61 eGFR 50 eGFR AA* 60 Lithium 0.760 mmol/L

 

                          2013 11:02 AM        TRIGLYCERIDES 106.0 mg/dLCHOLESTEROL 181.0 mg/dLHDL 46.0 mg/dLTOT

 CHOL/HDL 3.9 LDL (CALC) 114.0 mg/dLVITAMIN D 41.10 ng/mL

 

                          10/17/2014 10:10 AM       WBC 5.0 RBC 3.66 HGB 11.60 g/dLHCT 36.80 %.0 fLMCH 31.70

 pgMCHC 31.50 g/dLRDW SD 50 RDW CV 13.50 %MPV 10.10 fLPLT 209 NRBC# 0.00 NRBC% 
0.0 %NEUT 69.20 %%LYMP 21.0 %%MONO 6.40 %%EOS 3.20 %%BASO 0.20 %#NEUT 3.46 #LYMP
 1.05 #MONO 0.32 #EOS 0.16 #BASO 0.01 MANUAL DIFF NOT IND GLUCOSE 100.0 
mg/dLSODIUM 148.0 mmol/LPOTASSIUM 3.90 mmol/LCHLORIDE 114.0 mmol/LCO2 26.0 
mmol/LBUN 12.0 mg/dLCREATININE 1.20 mg/dLSGOT/AST 9.0 IU/LSGPT/ALT <6 IU/LALK 
PHOS 82.0 IU/LTOTAL PROTEIN 6.90 g/dLALBUMIN 4.0 g/dLTOTAL BILI 0.40 
mg/dLCALCIUM 10.50 mg/dLAGE 64 GFR NonAA 45 GFR AA 55 eGFR 45 eGFR AA* 55 
TRIGLYCERIDES 96.0 mg/dLCHOLESTEROL 155.0 mg/dLHDL 38.0 mg/dLTOT CHOL/HDL 4.1 
LDL (CALC) 98.0 mg/dLLithium 0.850 mmol/LCancer Antigen (CA) 125 8.30 U/mL

 

                          2015 10:25 AM        Lithium 0.790 mmol/LWBC 4.8 RBC 3.44 HGB 11.0 g/dLHCT 35.20 

%.0 fLMCH 32.0 pgMCHC 31.30 g/dLRDW SD 53 RDW CV 14.0 %MPV 9.30 fLPLT 210
 NRBC# 0.00 NRBC% 0.0 %NEUT 70.80 %%LYMP 17.20 %%MONO 8.10 %%EOS 3.50 %%BASO 
0.40 %#NEUT 3.41 #LYMP 0.83 #MONO 0.39 #EOS 0.17 #BASO 0.02 MANUAL DIFF NOT IND 
TRIGLYCERIDES 107.0 mg/dLCHOLESTEROL 174.0 mg/dLHDL 43.0 mg/dLTOT CHOL/HDL 4.0 
LDL (CALC) 110.0 mg/dLGLUCOSE 90.0 mg/dLSODIUM 145.0 mmol/LPOTASSIUM 3.80 
mmol/LCHLORIDE 115.0 mmol/LCO2 24.0 mmol/LBUN 17.0 mg/dLCREATININE 1.30 
mg/dLSGOT/AST 18.0 IU/LSGPT/ALT 17.0 IU/LALK PHOS 56.0 IU/LTOTAL PROTEIN 6.70 
g/dLALBUMIN 3.90 g/dLTOTAL BILI 0.40 mg/dLCALCIUM 9.80 mg/dLAGE 64 GFR NonAA 41 
GFR AA 50 eGFR 41 eGFR AA* 50 

 

                          2015 8:50 AM        WBC 5.8 RBC 3.29 HGB 10.70 g/dLHCT 34.0 %.0 fLMCH 32.50

 pgMCHC 31.50 g/dLRDW SD 52 RDW CV 13.60 %MPV 9.60 fLPLT 223 NRBC# 0.00 NRBC% 
0.0 %NEUT 69.60 %%LYMP 18.90 %%MONO 8.50 %%EOS 2.80 %%BASO 0.20 %#NEUT 4.03 
#LYMP 1.09 #MONO 0.49 #EOS 0.16 #BASO 0.01 MANUAL DIFF NOT IND Lithium 0.620 
mmol/LGLUCOSE 83.0 mg/dLSODIUM 139.0 mmol/LPOTASSIUM 3.90 mmol/LCHLORIDE 109.0 
mmol/LCO2 22.0 mmol/LBUN 19.0 mg/dLCREATININE 1.40 mg/dLSGOT/AST 19.0 
IU/LSGPT/ALT 21.0 IU/LALK PHOS 55.0 IU/LTOTAL PROTEIN 6.50 g/dLALBUMIN 3.90 
g/dLTOTAL BILI 0.50 mg/dLCALCIUM 9.60 mg/dLAGE 65 GFR NonAA 38 GFR AA 46 eGFR 38
 eGFR AA* 46 TRIGLYCERIDES 121.0 mg/dLCHOLESTEROL 192.0 mg/dLHDL 51.0 mg/dLTOT 
CHOL/HDL 3.8 .0 mg/dLFREE T4 0.79 TSH 1.210 uIU/mLHemoglobin A1c 5.40 
%Estim. Avg Glu (eAG) 108 

 

                          3/15/2016 8:08 AM         VITAMIN B12 696.0 pg/mL

 

                          3/23/2016 8:26 AM         WBC 7.0 RBC 3.61 HGB 11.80 g/dLHCT 37.70 %.0 fLMCH 32.70

 pgMCHC 31.30 g/dLRDW SD 49 RDW CV 12.50 %MPV 10.0 fLPLT 207 NRBC# 0.00 NRBC% 
0.0 %NEUT 73.60 %%LYMP 16.40 %%MONO 6.60 %%EOS 3.0 %%BASO 0.30 %#NEUT 5.15 #LYMP
 1.15 #MONO 0.46 #EOS 0.21 #BASO 0.02 MANUAL DIFF NOT IND Lithium 0.940 
mmol/LGLUCOSE 108.0 mg/dLSODIUM 143.0 mmol/LPOTASSIUM 4.30 mmol/LCHLORIDE 110.0 
mmol/LCO2 27.0 mmol/LBUN 16.0 mg/dLCREATININE 1.60 mg/dLSGOT/AST 13.0 
IU/LSGPT/ALT 7.0 IU/LALK PHOS 71.0 IU/LTOTAL PROTEIN 6.80 g/dLALBUMIN 4.0 
g/dLTOTAL BILI 0.20 mg/dLCALCIUM 10.40 mg/dLAGE 65 GFR NonAA 32 GFR AA 39 eGFR 
32 eGFR AA* 39 TRIGLYCERIDES 113.0 mg/dLCHOLESTEROL 169.0 mg/dLHDL 42.0 mg/dLTOT
 CHOL/HDL 4.0 LDL (CALC) 104.0 mg/dLFREE T4 0.86 TSH 2.20 uIU/mLHemoglobin A1c 
5.20 %Estim. Avg Glu (eAG) 103 

 

                          3/25/2016 9:17 AM         COLOR YELLOW APPEARANCE CLEAR SPEC GRAV 1.010 pH 7.0 PROTEIN 

NEGATIVE GLUCOSE NEGATIVE mg/dLKETONE NEGATIVE BILIRUBIN NEGATIVE BLOOD NEGATIVE
 NITRITE NEGATIVE [place] SCREEN SMALL MICRO IND? SEE BELOW WBC/HPF 0-5 RBC/HPF 
NEGATIVE CASTS/LPF NEGATIVE /LPFCRYSTALS NEGATIVE MUCOUS THRDS NEGATIVE BACTERIA
 NEGATIVE EPITH CELLS FEW SQUAMOUS /HPFTRICHOMONAS NEGATIVE YEAST NEGATIVE 

 

                          2016 6:00 AM        GLUCOSE 91.0 mg/dLSODIUM 143.0 mmol/LPOTASSIUM 3.60 mmol/LCHLORIDE

 112.0 mmol/LCO2 23.0 mmol/LBUN 22.0 mg/dLCREATININE 1.20 mg/dLSGOT/AST 15.0 
IU/LSGPT/ALT 12.0 IU/LALK PHOS 61.0 IU/LTOTAL PROTEIN 5.40 g/dLALBUMIN 3.10 
g/dLTOTAL BILI 0.40 mg/dLCALCIUM 8.40 mg/dLAGE 66 GFR NonAA 45 GFR AA 55 eGFR 45
 eGFR AA* 55 WBC 3.0 RBC 3.05 HGB 9.80 g/dLHCT 32.10 %.0 fLMCH 32.10 
pgMCHC 30.50 g/dLRDW SD 54 RDW CV 14.20 %MPV 10.10 fLPLT 170 NRBC# 0.00 NRBC% 
0.0 %NEUT 50.70 %%LYMP 32.60 %%MONO 10.50 %%EOS 5.90 %%BASO 0.30 %#NEUT 1.54 
#LYMP 0.99 #MONO 0.32 #EOS 0.18 #BASO 0.01 MANUAL DIFF NOT IND 

 

                          2016 2:09 PM        COLOR YELLOW APPEARANCE CLEAR SPEC GRAV 1.010 pH 5.0 PROTEIN

 30 GLUCOSE NEGATIVE mg/dLKETONE NEGATIVE BILIRUBIN NEGATIVE BLOOD LARGE NITRITE
 NEGATIVE [place] SCREEN MODERATE MICRO INDICATED? SEE BELOW WBC/HPF  RBC/HPF
 20-50 CASTS/LPF NEGATIVE /LPFCRYSTALS NEGATIVE MUCOUS THRDS NEGATIVE BACTERIA 
NEGATIVE EPITH CELLS FEW SQUAMOUS /HPFTRICHOMONAS NEGATIVE YEAST NEGATIVE CULT 
SET UP? YES 

 

                          1/3/2017 4:08 PM          COLOR YELLOW APPEARANCE HAZY SPEC GRAV 1.015 pH 6.0 PROTEIN 30

 GLUCOSE NEGATIVE mg/dLKETONE NEGATIVE BILIRUBIN NEGATIVE BLOOD MODERATE NITRITE
 NEGATIVE [place] SCREEN LARGE MICRO INDICATED? SEE BELOW WBC/-200 RBC/HPF 
5-10 CASTS/LPF NEGATIVE /LPFCRYSTALS NEGATIVE MUCOUS THRDS NEGATIVE BACTERIA 
NEGATIVE EPITH CELLS 1+ SQUAMOUS /HPFTRICHOMONAS NEGATIVE YEAST NEGATIVE CULT 
SET UP? YES 

 

                          2017 4:24 PM         CREATININE 1.50 mg/dLAGE 66 GFR NonAA 35 GFR AA 42 eGFR 35 eGFR

 AA* 42 VITAMIN B12 1324.0 pg/mL

 

                          2017 11:30 AM         GLUCOSE 93.0 mg/dLSODIUM 143.0 mmol/LPOTASSIUM 3.10 mmol/LCHLORIDE

 101.0 mmol/LCO2 29.0 mmol/LBUN 26.0 mg/dLCREATININE 1.50 mg/dLSGOT/AST 23.0 
IU/LSGPT/ALT 13.0 IU/LALK PHOS 66.0 IU/LTOTAL PROTEIN 7.70 g/dLALBUMIN 4.30 
g/dLTOTAL BILI 0.40 mg/dLCALCIUM 10.30 mg/dLAGE 66 GFR NonAA 35 GFR AA 42 eGFR 
35 eGFR AA* 42 TRIGLYCERIDES 147.0 mg/dLCHOLESTEROL 184.0 mg/dLHDL 44.0 mg/dLTOT
 CHOL/HDL 4.2 .0 mg/dLWBC 5.4 RBC 3.98 HGB 12.90 g/dLHCT 40.20 %.0
 fLMCH 32.40 pgMCHC 32.10 g/dLRDW SD 50 RDW CV 13.50 %MPV 9.30 fLPLT 210 NRBC# 
0.00 NRBC% 0.0 %NEUT 54.20 %%LYMP 30.70 %%MONO 9.10 %%EOS 5.20 %%BASO 0.40 
%#NEUT 2.94 #LYMP 1.66 #MONO 0.49 #EOS 0.28 #BASO 0.02 MANUAL DIFF NOT IND FREE 
T4 1.09 COLOR YELLOW APPEARANCE CLEAR SPEC GRAV <=1.005 pH 5.5 PROTEIN NEGATIVE 
GLUCOSE NEGATIVE mg/dLKETONE NEGATIVE BILIRUBIN NEGATIVE BLOOD NEGATIVE NITRITE 
NEGATIVE [place]UK SCREEN LARGE MICRO INDICATED? SEE BELOW WBC/HPF 10-20 RBC/HPF 0-5 
CASTS/LPF NEGATIVE /LPFCRYSTALS NEGATIVE MUCOUS THRDS NEGATIVE BACTERIA FEW 
EPITH CELLS 1+ SQUAMOUS /HPFTRICHOMONAS NEGATIVE YEAST NEGATIVE CULT SET UP? YES
 TSH 1.820 uIU/mL







History Of Immunizations







       Name    Date Admin    Mfg Name    Mfg Code    Trade Name    Lot#    Route    Inj    Vis Given    Vis

 Pub                                    CVX

 

        Influenza    2008    Not Entered    NE      Not Entered            Not Entered    Not Entered

                    1            999

 

        X       12/19/2008    [employer]    MSD     Pneumovax 23            Intramuscular    Not Entered

                    1            999

 

           Influenza    10/15/2010    [employer]rely.    NOV        Fluvirin > 12 Years    

[id number]     Intramuscular    Left Deltoid    10/15/2010    2009    999

 

          X         3/23/2015    [facility]    [facility]       Prevnar 13    [id number]    Intramuscular

                Right Gluteous Medius    3/23/2015       2013       109







History of Past Illness







                    Name                Date of Onset       Comments

 

                    Peritoneal Neoplasm, Malignant                         

 

                    Hyperlipidemia                           

 

                    Bipolar disorder, unspecified                         

 

                    Artificial opening status; colostomy                         

 

                    B12 deficiency                           

 

                    Anemia, Pernicious                         

 

                    Arthritis unspecified                         

 

                    cervical cancer                          

 

                    Artificial opening status; colostomy    2010  1:10PM     

 

                    Bipolar disorder, unspecified    2010  1:10PM     

 

                    Hyperlipidemia      2010  1:10PM     

 

                    Anemia, Pernicious    2010  1:10PM     

 

                    Postoperative Follow-Up    2010  1:55PM     

 

                    Postoperative Follow-Up    Mar  8 2010 10:57AM     

 

                    Artificial opening status; colostomy    Mar  8 2010  1:19PM     

 

                    Peritoneal Neoplasm, Malignant    Mar  8 2010  1:19PM     

 

                    Artificial opening status; colostomy    2010  1:40PM     

 

                    Hyperlipidemia      2010  1:40PM     

 

                    Anemia, Pernicious    2010  1:40PM     

 

                    Peritoneal Neoplasm, Malignant    2010  1:40PM     

 

                    Arthritis unspecified    2010  1:40PM     

 

                    Anemia of Chronic Illness    2010  1:40PM     

 

                    Tinea corporis      2010  3:17PM     

 

                    Bipolar disorder, unspecified    2010  1:33PM     

 

                    Hyperlipidemia      2010  1:33PM     

 

                    Anemia, Pernicious    2010  1:33PM     

 

                    Peritoneal Neoplasm, Malignant    2010  1:33PM     

 

                    B12 deficiency      2010  1:33PM     

 

                    Ethmoidal Sinusitis, Acute    Sep 21 2010  3:53PM     

 

                    Wheezing            Sep 21 2010  3:53PM     

 

                    Flu                 Oct 15 2010  1:40PM     

 

                    Bipolar disorder, unspecified    Oct 15 2010  1:42PM     

 

                    Hyperlipidemia      Oct 15 2010  1:42PM     

 

                    Anemia, Pernicious    Oct 15 2010  1:42PM     

 

                    Peritoneal Neoplasm, Malignant    Oct 15 2010  1:42PM     

 

                    Bipolar disorder, unspecified    2011 12:01PM     

 

                    Hyperlipidemia      2011 12:01PM     

 

                    Anemia, Pernicious    2011 12:01PM     

 

                    Peritoneal Neoplasm, Malignant    2011 12:01PM     

 

                    Bipolar disorder, unspecified    Apr 15 2011 10:55AM     

 

                    Major Depression    2011 10:11AM     

 

                    Bipolar Disorder    2011 10:11AM     

 

                    Cancer              May 10 2011  4:16PM     

 

                    Major Depression    May 10 2011  3:16PM     

 

                    Bipolar Disorder    May 10 2011  3:16PM     

 

                    Hypercalcemia       May 23 2011  2:47PM     

 

                    Bipolar disorder, unspecified    May 23 2011  2:47PM     

 

                    Colon Cancer, Personal History    May 23 2011  2:47PM     

 

                    Bipolar Disorder    May 31 2011  4:39PM     

 

                    Depressive Disorder    2011 10:01AM     

 

                    Vitamin B12 deficiency    2011 10:01AM     

 

                    Vitamin D Deficiency    2011  5:07PM     

 

                    Anemia, Vitamin B12 Deficiency    2011  5:07PM     

 

                    B12 deficiency      2011  3:56PM     

 

                    Routine gynecological examination    Aug  4 2011  9:08AM     

 

                    Screening Examination for Breast Cancer    Aug  4 2011  9:08AM     

 

                    Tinea Corporis      Aug  4 2011  9:08AM     

 

                    Depressive Disorder    Sep 23 2011  8:47AM     

 

                    Contact Dermatitis    Sep 23 2011  8:47AM     

 

                    Anemia, Pernicious    Sep 23 2011  8:47AM     

 

                    B12 deficiency      Sep 23 2011  8:47AM     

 

                    B12 deficiency      Sep 27 2011  2:58PM     

 

                    B12 deficiency      Oct 20 2011  2:34PM     

 

                    Flu                 Dec  9 2011  3:16PM     

 

                    B12 deficiency      Dec  9 2011  3:17PM     

 

                    B12 deficiency      2012  4:52PM     

 

                    B12 deficiency      Feb 2012 11:10AM     

 

                    B12 deficiency      2012  3:37PM     

 

                    B12 deficiency      May  3 2012  4:10PM     

 

                    B12 deficiency      2012  2:54PM     

 

                    B12 deficiency      2012 11:23AM     

 

                    B12 deficiency      Aug  9 2012  2:08PM     

 

                    B12 deficiency      Sep  6 2012  4:36PM     

 

                    B12 deficiency      Oct 16 2012 10:23AM     

 

                    Flu                 Feb  4   3:11PM     

 

                    Bipolar disorder, unspecified    Feb  4   2:48PM     

 

                    Anemia, Pernicious    Feb  4   2:48PM     

 

                    B12 deficiency      Feb  4   2:48PM     

 

                    Extrapyramidal abnormal movement disorder    Feb  4   2:48PM     

 

                    B12 deficiency      Apr  3 2013 12:03PM     

 

                    Bipolar disorder, unspecified    May  7 2013  1:31PM     

 

                    Anemia, Pernicious    May  7 2013  1:31PM     

 

                    B12 deficiency      May  7 2013  1:31PM     

 

                    Extrapyramidal abnormal movement disorder    May  7 2013  1:31PM     

 

                    B12 deficiency      2013  3:42PM     

 

                    B12 deficiency      2013  1:31PM     

 

                    Hyperlipidemia      Aug  7 2013 10:37AM     

 

                    Vitamin D Deficiency    Aug  7 2013 10:37AM     

 

                    Bipolar disorder, unspecified    Aug  7 2013 10:37AM     

 

                    Anemia, Pernicious    Aug  7 2013 10:37AM     

 

                    B12 deficiency      Aug  7 2013 10:37AM     

 

                    B12 deficiency      Sep 25 2013 11:15AM     

 

                    B12 deficiency      Dec 11 2013  3:16PM     

 

                    B12 deficiency      Mar  6 2014  1:48PM     

 

                    B12 deficiency      May 21 2014  3:17PM     

 

                    Screening Examination for Breast Cancer    2014  3:23PM     

 

                    Periumbilical abdominal pain    2014  3:23PM     

 

                    B12 deficiency      Jul 10 2014  2:52PM     

 

                    Anemia, Vitamin B12 Deficiency    Aug 13 2014  4:50PM     

 

                    Bipolar disorder    Oct 16 2014 11:13AM     

 

                    Hyperlipidemia      Oct 16 2014 11:13AM     

 

                    Anemia, Pernicious    Oct 16 2014 11:13AM     

 

                    Peritoneal Neoplasm, Malignant    Oct 16 2014 11:13AM     

 

                    Screening breast examination    Oct 16 2014 11:13AM     

 

                    Weight loss         Oct 16 2014 11:13AM     

 

                    Anemia, Pernicious    Mar 23 2015  2:57PM     

 

                    B12 deficiency      Mar 23 2015  2:57PM     

 

                    Need for Prevnar vaccine    Mar 23 2015  2:57PM     

 

                    Bipolar disorder    Mar 23 2015  2:57PM     

 

                    Hyperlipidemia      Mar 23 2015  2:57PM     

 

                    Anemia, Pernicious    Mar 23 2015  2:57PM     

 

                    Peritoneal Neoplasm, Malignant    Mar 23 2015  2:57PM     

 

                    B12 deficiency      May  4 2015  4:48PM     

 

                    Hyperlipidemia      May 13 2015  9:56AM     

 

                    Anemia              May 13 2015  9:56AM     

 

                    Bipolar disorder    May 13 2015  9:56AM     

 

                    Bipolar disorder    May 14 2015  3:27PM     

 

                    Hyperlipidemia      May 14 2015  3:27PM     

 

                    Anemia, Pernicious    May 14 2015  3:27PM     

 

                    Peritoneal Neoplasm, Malignant    May 14 2015  3:27PM     

 

                    B12 deficiency      2015  2:20PM     

 

                    B12 deficiency      2015 11:34AM     

 

                    B12 deficiency      Aug 18 2015  9:06AM     

 

                    Tinea Corporis      Sep 18 2015  8:54AM     

 

                    B12 deficiency      Sep 18 2015  8:54AM     

 

                    B12 deficiency      2015 10:28AM     

 

                    Herpes zoster without complication    Dec  3 2015  9:52AM     

 

                    B12 deficiency      Dec 23 2015 11:21AM     

 

                    B12 deficiency      2016  4:51PM     

 

                    Vitamin B 12 deficiency    Mar 14 2016  5:35PM     

 

                    B12 deficiency      Mar 15 2016 12:14PM     

 

                    B12 deficiency      May  5 2016 11:30AM     

 

                    Edema               May  5 2016 11:30AM     

 

                    Dermatitis          May  5 2016 11:30AM     

 

                    Edema               May 17 2016  8:38AM     

 

                    Shortness of breath    May 17 2016  8:38AM     

 

                    Bilateral edema of lower extremity    2016  2:06PM     

 

                    B12 deficiency      2016  2:06PM     

 

                    B12 deficiency      2016 11:50AM     

 

                    B12 deficiency      2016 11:20AM     

 

                    Diarrhea            Aug  2 2016  3:13PM     

 

                    B12 deficiency      Aug 24 2016 11:10AM     

 

                    Encounter for screening mammogram for breast cancer    Aug 24 2016 11:44AM     

 

                    B12 deficiency      Sep 28 2016  2:35PM     

 

                    B12 deficiency      Dec 15 2016  2:02PM     

 

                    Dysuria             Dec 29 2016 12:14PM     

 

                    Hematuria           Fidencio  3 2017  1:33PM     

 

                    Constipation by delayed colonic transit    2017  1:52PM     

 

                    Ileus               2017  1:52PM     

 

                    UTI (urinary tract infection)    Fidencio 15 2017  3:39PM     

 

                    Acute cystitis with hematuria    2017 11:07AM     

 

                    B12 deficiency      2017 11:07AM     

 

                    B12 deficiency      2017 11:40AM     

 

                    B12 deficiency      2017  4:07PM     

 

                    Slurred speech      2017  3:07PM     

 

                    Vitamin B12 deficiency    2017  3:07PM     

 

                    Dysphagia, unspecified type    2017  3:07PM     

 

                    Hyperlipidemia      2017  3:07PM     







Payers







           Insurance Name    Company Name    Plan Name    Plan Number    Policy Number    Policy Group

 Number                                 Start Date

 

                    Medicare RHC Medicare RHC              [id number]              N/A

 

                          [employer]    [employer]                 [id number]

                                                    

 

                    Medicare Part A    Medicare - Lab/Xray              [id number]              2006

 

                    Medicare Part B    Medicare [place]              [id number]              2006

 

                          [employer]    [employer]                 50 percent

                                                    2009

 

                    [facility]    [employer]              [id number]              N/A

 

                    Medicare Part A    Medicare Part A              [id number]              N/A

 

                    Medicare Part A    Medicare Part A              [id number]              2006









History of Encounters







                    Visit Date          Visit Type          Provider

 

                    2017           Office visit         

 

                    2017           Office visit        Bhupinder Louise DO

 

                    2017           Nurse visit         Bhupinder Louise DO

 

                    2017           Office visit        Radha Ontiveros APRN

 

                    1/15/2017           Office visit        Aj Tapia NP

 

                    2017            Office visit        Devin Masterson MD

 

                    2016          [facility]            Devin Masterson MD

 

                    12/15/2016          Nurse visit         Bhupinder Louise DO

 

                    2016           Nurse visit         Bret GREEN

 

                    2016           Nurse visit         Bhupinder Louise DO

 

                    2016            Office visit        Bhupinder Louise DO

 

                    2016           Nurse visit         Bhupinder Louise DO

 

                    2016           Office visit        Bret GREEN

 

                    2016            Office visit        Bhupinder Aspen DO

 

                    3/15/2016           Nurse visit         Bhupinder Aspen DO

 

                    2016            Nurse visit         Bhupinder Aspen DO

 

                    2015          Nurse visit         Bhupinder Aspen DO

 

                    12/3/2015           Office visit        Bhupinder Aspen DO

 

                    2015          Nurse visit         Bhupinder Aspen DO

 

                    2015           Office visit        Bhupinder Aspen DO

 

                    2015           Nurse visit         Bhupinder Aspen DO

 

                    2015            Nurse visit         Bhupinder Aspen DO

 

                    2015            Nurse visit         Bhupinder Aspen DO

 

                    2015           Office visit        Bhupinder Aspen DO

 

                    2015            Nurse visit         Bhupinder Aspen DO

 

                    3/23/2015           Office visit        Bhupinder Aspen DO

 

                    10/16/2014          Office visit        Bhupinder Aspen DO

 

                    2014           Nurse visit         Radha Ontiveros APRN

 

                    7/10/2014           Nurse visit         Bhupinder Aspen DO

 

                    2014           Office visit        Bhupinder Aspen DO

 

                    2014           Nurse visit         Bhupinder Aspen DO

 

                    3/6/2014            Nurse visit         Bhupinder Aspen DO

 

                    2014            [facility]            EARNEST Lopez MD

 

                    2013          Nurse visit         Bhupinder Aspen DO

 

                    2013           Nurse visit         Bhupinder Aspen DO

 

                    2013            Office visit        Bhupinder Aspen DO

 

                    2013            Nurse visit         Bhupinder Aspen DO

 

                    2013            Nurse visit         Bhupinder Aspen DO

 

                    2013            Office visit        Bhupinder Aspen DO

 

                    4/3/2013            Nurse visit         Bhupinder Aspen DO

 

                    2013            Office visit        Bhupinder Aspen DO

 

                    10/16/2012          Nurse visit         Bhupinder Aspen DO

 

                    2012            Nurse visit         Bhupinder Aspen DO

 

                    2012            Voided              Bhupinder Aspen DO

 

                    2012            Nurse visit         Bhupinder Aspen DO

 

                    2012            Nurse visit         Bhupinder Aspen DO

 

                    2012           Nurse visit         Bhupinder Aspen DO

 

                    5/3/2012            Nurse visit         Bhupinder Aspen DO

 

                    2012           Nurse visit         Bhupinder Aspen DO

 

                    2012           Nurse visit         Bhupinder Aspen DO

 

                    2012           Nurse visit         Bhupinder Aspen DO

 

                    2011           Nurse visit         Bhupinder Louise DO

 

                    10/20/2011          Nurse visit         Bhupinder Louise DO

 

                    2011           Office visit        Bhupinder Aspen DO

 

                    2011           Nurse visit         Radha GREEN

 

                    2011            Office visit        Bhupinder Aspen DO

 

                    2011           Nurse visit         Bhupinder Aspen DO

 

                    2011            Office visit        Bhupinder Aspen DO

 

                    2011           Office visit        Bhupinder Aspen DO

 

                    5/10/2011           Office visit        Bhupinder Aspen DO

 

                    2011           Office visit        Bhupinder Louise DO

 

                    4/15/2011           Office visit        Devin Angel DO

 

                    2011           Office visit        Devin Angel DO

 

                    10/15/2010          Office visit        Devin Angel DO

 

                    2010           Office visit        Devin Angel DO

 

                    2010            Office visit        Devin Angel DO

 

                    2010           Office visit        Devin Angel DO

 

                    2010            Office visit        Devin Angel DO

 

                    3/8/2010            Office visit        Devin Masterson MD

 

                    2010            Surgery             Devin aMsterson MD

 

                    2010            Office visit        Devin Angel DO

 

                    2010           Surgery             Devin Masterson MD

 

                    2010           Hospital            Devin Masterson MD

 

                    2010           Hospital            Devin Masterson MD

 

                    10/22/2009          Office visit        Devin Angel DO

## 2019-06-26 NOTE — XMS REPORT
MU2 Ambulatory Summary

                             Created on: 2015



Pauline Gan

External Reference #: [id number]

: 1950

Sex: Female



Demographics







                          Address                   [address]

[place]  [address]

 

                          Home Phone                [phone]

 

                          Preferred Language        English

 

                          Marital Status            Legally 

 

                          [employer] Affiliation     Unknown

 

                          Race                      White

 

                          Ethnic Group              Not  or 





Author







                          Bhupinder Aguilar

 

                          [facility]

 

                          Address                   [address]

[place]  [id number]



 

                          Phone                     [phone]







Care Team Providers







                    Care Team Member Name    Role                Phone

 

                    Bhupinder Louise    PCP                 Unavailable







Allergies and Adverse Reactions







                    Name                Reaction            Notes

 

                    NO KNOWN DRUG ALLERGIES                         







Plan of Treatment







             Planned Activity    Comments     Planned Date    Planned Time    Plan/Goal

 

             COMPLETE CBC W/AUTO DIFF WBC                 2013     12:00 AM      

 

             ASSAY OF LITHIUM                 2013     12:00 AM      

 

             METABOLIC PANEL TOTAL CA                 2013     12:00 AM      

 

             VITAMIN B-12                 2013     12:00 AM      

 

             ASSAY OF FOLIC ACID SERUM                 2013     12:00 AM      

 

             THER/PROPH/DIAG INJ SC/IM                 2013    12:00 AM      







Medications







                                        Active 

 

             Name         Start Date    Estimated Completion Date    SIG          Comments

 

                syringe with needle (disp) 1 mL 25 X 1" miscellaneous syringe    2011                        use 

for injection once a month               

 

                Latuda 20 mg oral tablet                                    take 1 tablet (20 mg) by oral route once daily with

 food (at least 350 calories)            

 

             pravastatin 40 mg oral tablet    3/30/2015                 TAKE 1 TABLET BY MOUTH DAILY     

 

                fluconazole 100 mg oral tablet    2015                       take 1 tablet (100 mg) by oral route

 once a week                             

 

                ketoconazole 2 % topical cream    2015                       apply to the affected area(s) by topical

 route 2 times per day                   

 

                Namenda XR 28 mg oral capsule,sprinkle,ER 24hr    2015                       take 1 capsule (28

 mg) by oral route once daily            









                                         

 

             Name         Start Date    Expiration Date    SIG          Comments

 

             Reglan 10mg    3/29/2010    2010    one ac and hs     

 

                Keflex 500 mg oral capsule    2010       10/1/2010       take 1 capsule (500 mg) by oral

 route every 6 hours for 10 days         

 

                Bactrim -160 mg oral tablet    2011       take 1 tablet by oral route

 every 12 hours for 7 days               

 

                triamcinolone acetonide 0.1 % topical cream    2011      apply a thin

 layer to the affected area(s) by topical route 2 times per day     

 

                sertraline 100 mg oral tablet    4/10/2012       5/10/2012       take 1.5 tablets by oral route

 daily for 30 days                       

 

                ergocalciferol (vitamin D2) 50,000 unit oral capsule    4/15/2013       2013       TAKE

 ONE CAPSULE BY MOUTH ONCE A WEEK        

 

                CYANOCOBALAM 1000MCGINJ 1000 milliliter    2013       INJECT 1ML INTRAMUSCULAR

 ONCE A MONTH                            

 

                pravastatin 40 mg oral tablet    3/25/2014       3/20/2015       TAKE ONE TABLET BY MOUTH EVERY

 DAY                                     

 

                          Zostavax (PF) 19,400 unit/0.65 mL subcutaneous suspension for reconstitution    3/23/2015

                    3/24/2015           inject 0.65 milliliter by subcutaneous route once     

 

                lithium carbonate 300 mg oral capsule    2015       take 1 capsule (300

 mg) by oral route 2 for 30 days         









                                        Discontinued 

 

             Name         Start Date    Discontinued Date    SIG          Comments

 

                Tylenol 325 mg oral tablet                    2013        take 1 - 2 tablets (325 -650 mg) by oral

 route every 4-6 hours as needed         

 

                Calcium 600 + D(3) 600 mg(1,500mg) -400 unit oral tablet                    2011       take 1 tablet

 by oral route 2 times a day            no longer taking

 

                Vitamin B-12 1,000 mcg oral tablet extended release    2010       take 1

 tablet by oral route daily             no longer taking

 

                Antifungal (clotrimazole) 1 % topical cream    2010       apply to the 

affected and surrounding areas of skin by topical route 2 times per day morning 
and evening                              

 

                sertraline 100 mg oral tablet    5/10/2011       2011       take 2 tablets (200 mg) by 

oral route once daily                   discontinued by Dr. Serrano

 

                mirtazapine 15 mg oral tablet                    2011        take 1 tablet (15 mg) by oral route 

once daily before bedtime               Dr. Serrano

 

                mirtazapine 15 mg oral tablet                    2011        take 1 tablet (15 mg) by oral route 

once daily before bedtime               dc'd by Dr. Serrano

 

                Pristiq 50 mg oral tablet extended release 24 hr                    2013        take 1 tablet (50

 mg) by oral route once daily           Dr. Serrano

 

                Pristiq 50 mg oral tablet extended release 24 hr                    2013        take 1 tablet (50

 mg) by oral route once daily           dose updated

 

                Vitamin B-12 1,000 mcg/mL injection solution    2011        inject 1 milliliter

 (1,000 mcg) by intramuscular route once a month    on list already

 

                clotrimazole 1 % topical cream    2011        apply to the affected and surrounding

 areas of skin by topical route 2 times per day in the morning and evening     

 

                Vitamin D2 50,000 unit oral capsule    2011        take 1 capsule (50,000

 unit) by oral route once weekly        generic on list

 

                Pravachol 40 mg oral tablet    2012        take 1 tablet (40 mg) by oral 

route once daily for 90 days            generic on list

 

                lithium carbonate 300 mg oral capsule    2012        take 1 capsule by oral

 route daily                            dose updated

 

                Pristiq 100 mg oral tablet extended release 24 hr                    4/10/2012       take 1 and 1/2 

tablet (150 mg) by oral route once daily    Mental Health provider

 

                Pristiq 100 mg oral tablet extended release 24 hr                    4/10/2012       take 1 and 1/2 

tablet (150 mg) by oral route once daily    Discontinued by Dr Efrain Knight at [facility]

 

                hydroxyzine HCl 50 mg oral tablet    10/16/2014      2015       take 1 tablet (50 mg) 

by oral route at bedtime                 







Problem List







                    Description         Status              Onset

 

                    Artificial opening status; colostomy    Active               

 

                    Bipolar disorder, unspecified    Active               

 

                    Hyperlipidemia      Active               

 

                    Peritoneal Neoplasm, Malignant    Active               

 

                    Anemia, Pernicious    Active               

 

                    Arthritis unspecified    Active               

 

                    B12 deficiency      Active               







Vital Signs







      Date    Time    BP-Sys(mm[Hg]    BP-Lynn(mm[Hg])    HR(bpm)    RR(rpm)    Temp    WT    HT    HC    BMI

                    BSA                 BMI Percentile      O2 Sat(%)

 

        2015    8:52:00 AM    132 mmHg    68 mmHg    52 bpm    20 rpm    97.8 F    141 lbs    69 in

                          20.82 kg/m2    1.76 m2                   100 %

 

        2015    3:25:00 PM    120 mmHg    62 mmHg    72 bpm    16 rpm    98.1 F    136 lbs    69 in

                          20.0835 kg/m    1.733 m                 98 %

 

       3/23/2015    2:55:00 PM    130 mmHg    76 mmHg    68 bpm    18 rpm    97 F    140 lbs    69 in    

                20.67 kg/m2     1.76 m2                         98 %

 

        10/16/2014    11:11:00 AM    120 mmHg    66 mmHg    77 bpm    20 rpm    98 F    130 lbs    69 in

                          19.1974 kg/m    1.6943 m                 100 %

 

        2014    3:21:00 PM    130 mmHg    66 mmHg    63 bpm    18 rpm    97.2 F    160 lbs    69 in

                          23.63 kg/m2    1.88 m2                   99 %

 

        2013    10:35:00 AM    132 mmHg    70 mmHg    66 bpm    20 rpm    98.1 F    157 lbs    69 in

                          23.1846 kg/m    1.862 m                  

 

        2013    1:29:00 PM    132 mmHg    70 mmHg    76 bpm    18 rpm    98.2 F    166 lbs    69 in 

                          24.51 kg/m2    1.91 m2                    

 

       2013    2:46:00 PM    128 mmHg    70 mmHg    76 bpm    16 rpm    98 F    160 lbs    69 in     

                23.6276 kg/m    1.8797 m                       

 

        2011    8:49:00 AM    128 mmHg    78 mmHg    70 bpm    18 rpm    97.9 F    164 lbs    69 in

                          24.22 kg/m2    1.90 m2                    

 

     2011    1:31:00 PM    132 mmHg    68 mmHg    84 bpm         97 F    167 lbs                        

                                         

 

        2011    9:09:00 AM    128 mmHg    70 mmHg    72 bpm    18 rpm    98.2 F    163 lbs    64 in 

                          27.98 kg/m2    1.83 m2                    

 

       2011    10:01:00 AM    132 mmHg    70 mmHg    72 bpm    18 rpm    98.2 F    154 lbs             

                                                                 

 

       2011    2:47:00 PM    128 mmHg    70 mmHg    72 bpm    18 rpm    97.8 F    156 lbs             

                                                                 

 

       5/10/2011    3:16:00 PM    144 mmHg    80 mmHg    72 bpm    18 rpm    98.2 F    158 lbs             

                                                                 

 

        2011    10:11:00 AM    132 mmHg    70 mmHg    70 bpm    18 rpm    98.2 F    168 lbs    69 in

                          24.81 kg/m2    1.93 m2                    

 

        4/15/2011    10:52:00 AM    110 mmHg    60 mmHg    75 bpm    16 rpm    97.5 F    172.375 lbs    

69 in                     25.4551 kg/m    1.951 m                 100 %

 

        2011    11:43:00 AM    120 mmHg    82 mmHg    75 bpm    16 rpm    97.2 F    178.5 lbs    69

 in                       26.36 kg/m2    1.99 m2                   100 %

 

        10/15/2010    1:32:00 PM    120 mmHg    70 mmHg    80 bpm    18 rpm    96.6 F    177 lbs    69 in

                          26.1381 kg/m    1.977 m                 100 %

 

        2010    3:50:00 PM    168 mmHg    100 mmHg    82 bpm    18 rpm    97.8 F    177.5 lbs    69

 in                       26.21 kg/m2    1.98 m2                   97 %

 

        2010    1:21:00 PM    140 mmHg    80 mmHg    59 bpm    16 rpm    97.6 F    173.25 lbs    69 

in                        25.5843 kg/m    1.956 m                 100 %

 

        2010    3:02:00 PM    140 mmHg    80 mmHg    61 bpm    16 rpm    97.6 F    173.125 lbs    69

 in                       25.57 kg/m2    1.96 m2                   99 %

 

        2010    1:23:00 PM    130 mmHg    80 mmHg    66 bpm    16 rpm    96.8 F    173 lbs    69 in 

                          25.5474 kg/m    1.9546 m                 100 %

 

        2010    12:58:00 PM    130 mmHg    88 mmHg    75 bpm    16 rpm    98.4 F    172.25 lbs    69

 in                       25.44 kg/m2    1.95 m2                   100 %







Social History







                    Name                Description         Comments

 

                    denies alcohol use                         

 

                    denies smoking                           

 

                    Denies illicit substance abuse                         

 

                    retired                                 direct care

 

                    Single                                   

 

                    Exercises regularly                         

 

                    Attended some college                         

 

                    Tobacco             Never smoker         







History of Procedures







                    Date Ordered        Description         Order Status

 

                    2010 12:00 AM    COMPREHEN METABOLIC PANEL    Reviewed

 

                    2010 12:00 AM    COMPLETE CBC W/AUTO DIFF WBC    Reviewed

 

                    2010 12:00 AM    LIPID PANEL         Reviewed

 

                          2015 12:00 AM        B12 Injection, Up to 1000 Mcg NDC#6757-3024-81 [facility] 

                                        Reviewed

 

                    2011 12:00 AM    MAMMOGRAM SCREENING    Reviewed

 

                    2011 12:00 AM    CYTOPATH C/V THIN LAYER    Reviewed

 

                    2011 12:00 AM    B12 Injection 1 cc NDC#[address]-98    Reviewed

 

                    2011 12:00 AM    THER/PROPH/DIAG INJ SC/IM    Reviewed

 

                    2011 12:00 AM    B12 Injection([facility]) Ndc#2020-0192-02-    Reviewed

 

                    10/20/2011 12:00 AM    THER/PROPH/DIAG INJ SC/IM    Reviewed

 

                    10/20/2011 12:00 AM    B12 Injection([place]) Ndc#2475-8194-54-    Reviewed

 

                    2011 12:00 AM    ***Immunization administration, Medicare flu    Reviewed

 

                    2011 12:00 AM    Fluzone ** MEDICARE Only **    Reviewed

 

                    2011 12:00 AM    THER/PROPH/DIAG INJ SC/IM    Reviewed

 

                    2011 12:00 AM    B12 Injection ([facility]) Ndc#5966-4567-96    Reviewed

 

                    2012 12:00 AM    THER/PROPH/DIAG INJ SC/IM    Reviewed

 

                    2012 12:00 AM    B12 Injection ([facility]) Ndc#0661-2700-59    Reviewed

 

                    2012 12:00 AM    THER/PROPH/DIAG INJ SC/IM    Reviewed

 

                    2012 12:00 AM    B12 Injection([place]) Ndc#7784-6768-00-    Reviewed

 

                    5/3/2012 12:00 AM    THER/PROPH/DIAG INJ SC/IM    Reviewed

 

                    5/3/2012 12:00 AM    B12 Injection([facility]ella) Ndc#9516-7903-19-    Reviewed

 

                    2012 12:00 AM    IMMUNOTHERAPY INJECTIONS    Reviewed

 

                    2012 12:00 AM    B12 Injection(Arabella) Ndc#8207-7299-21-    Reviewed

 

                    2012 12:00 AM    THER/PROPH/DIAG INJ SC/IM    Reviewed

 

                    2012 12:00 AM    B12 Injection, Up to 1000 Mcg NDC#6709-4708-63    Reviewed

 

                    2012 12:00 AM    THER/PROPH/DIAG INJ SC/IM    Reviewed

 

                    2012 12:00 AM    B12 Injection, Up to 1000 Mcg NDC#9102-2125-72    Reviewed

 

                    2012 12:00 AM    THER/PROPH/DIAG INJ SC/IM    Reviewed

 

                    2012 12:00 AM    B12 Injection, Up to 1000 Mcg NDC#0844-5892-76    Reviewed

 

                    10/16/2012 12:00 AM    THER/PROPH/DIAG INJ SC/IM    Reviewed

 

                    10/16/2012 12:00 AM    B12 Injection, Up to 1000 Mcg NDC#4555-3681-84    Reviewed

 

                    2010 12:00 AM    COMPREHEN METABOLIC PANEL    Reviewed

 

                    2010 12:00 AM    COMPLETE CBC W/AUTO DIFF WBC    Reviewed

 

                    2010 12:00 AM    LIPID PANEL         Reviewed

 

                    2013 12:00 AM    Flu Injection 3 Years And Above NDC# [address]-88  [facility]    Reviewed



 

                    2013 12:00 AM    COMPLETE CBC W/AUTO DIFF WBC    Reviewed

 

                    2013 12:00 AM    ASSAY OF LITHIUM    Reviewed

 

                    2013 12:00 AM    METABOLIC PANEL TOTAL CA    Reviewed

 

                    4/3/2013 12:00 AM    THER/PROPH/DIAG INJ SC/IM    Reviewed

 

                    4/3/2013 12:00 AM    B12 Injection, Up to 1000 Mcg NDC#4155-8622-24    Reviewed

 

                    2013 12:00 AM    THER/PROPH/DIAG INJ SC/IM    Reviewed

 

                    2013 12:00 AM    B12 Injection, Up to 1000 Mcg NDC#6699-8063-26    Reviewed

 

                    2013 12:00 AM    THER/PROPH/DIAG INJ SC/IM    Reviewed

 

                    2013 12:00 AM    B12 Injection, Up to 1000 Mcg NDC#6613-1812-50    Reviewed

 

                    2013 12:00 AM    LIPID PANEL         Reviewed

 

                    2013 12:00 AM    VITAMIN D 25 HYDROXY    Reviewed

 

                    2013 12:00 AM    THER/PROPH/DIAG INJ SC/IM    Reviewed

 

                    3/6/2014 12:00 AM    THER/PROPH/DIAG INJ SC/IM    Reviewed

 

                    2014 12:00 AM    THER/PROPH/DIAG INJ SC/IM    Reviewed

 

                    2014 12:00 AM    B12 Injection, Up to 1000 Mcg NDC#9896-4491-03    Reviewed

 

                    2010 12:00 AM    SKIN FUNGI CULTURE    Reviewed

 

                    10/9/2010 12:00 AM    COMPREHEN METABOLIC PANEL    Reviewed

 

                    10/9/2010 12:00 AM    LIPID PANEL         Reviewed

 

                    2010 12:00 AM    THER/PROPH/DIAG INJ SC/IM    Reviewed

 

                    2010 12:00 AM    B12 Injection Ndc#[address]-76 ([facility])    Reviewed

 

                    2010 12:00 AM    THER/PROPH/DIAG INJ SC/IM    Reviewed

 

                    2010 12:00 AM    Kenalog 40 Mg Im-Ndc#[address]-45 ([facility])    Reviewed

 

                    10/15/2010 12:00 AM    FLU VACCINE 3 YRS & > IM    Reviewed

 

                    10/15/2010 12:00 AM    Admin.Of M/C Cov.Vaccine-Flu Vacc.    Reviewed

 

                    1/15/2011 12:00 AM    COMPLETE CBC W/AUTO DIFF WBC    Reviewed

 

                    1/15/2011 12:00 AM    COMPREHEN METABOLIC PANEL    Reviewed

 

                    1/15/2011 12:00 AM    LIPID PANEL         Reviewed

 

                    2014 12:00 AM    MAMMOGRAM SCREENING    Reviewed

 

                    2014 12:00 AM    Screening mammography, bilateral    Reviewed

 

                    7/10/2014 12:00 AM    THER/PROPH/DIAG INJ SC/IM    Reviewed

 

                    7/10/2014 12:00 AM    B12 Injection, Up to 1000 Mcg NDC#9312-4421-65    Reviewed

 

                    2011 12:00 AM    COMPLETE CBC W/AUTO DIFF WBC    Reviewed

 

                    2011 12:00 AM    COMPREHEN METABOLIC PANEL    Reviewed

 

                    2011 12:00 AM    LIPID PANEL         Reviewed

 

                    2014 12:00 AM    B12 Injection, Up to 1000 Mcg NDC#6152-1327-73    Reviewed

 

                    10/19/2014 12:00 AM    MAMMOGRAM SCREENING    Reviewed

 

                    10/19/2014 12:00 AM    Screening mammography, bilateral    Reviewed

 

                    10/16/2014 12:00 AM    COMPLETE CBC W/AUTO DIFF WBC    Reviewed

 

                    10/16/2014 12:00 AM    COMPREHEN METABOLIC PANEL    Reviewed

 

                    10/16/2014 12:00 AM    IMMUNOASSAY TUMOR     Reviewed

 

                    10/16/2014 12:00 AM    LIPID PANEL         Reviewed

 

                    10/16/2014 12:00 AM    ASSAY OF LITHIUM    Reviewed

 

                    10/16/2014 12:00 AM    MAMMOGRAM SCREENING    Reviewed

 

                    2011 12:00 AM    ASSAY OF PARATHORMONE    Reviewed

 

                    2011 12:00 AM    VITAMIN D 25 HYDROXY    Reviewed

 

                    2011 12:00 AM    ASSAY OF LITHIUM    Reviewed

 

                    2011 12:00 AM    METABOLIC PANEL TOTAL CA    Reviewed

 

                    2011 12:00 AM    CT HEAD/BRAIN W/O & W/DYE    Reviewed

 

                    3/23/2015 12:00 AM    PNEUMOCOCCAL VACC 13 GLENDY IM    Reviewed

 

                    3/23/2015 12:00 AM    Vitamin B12 injection    Reviewed

 

                    2011 12:00 AM    ASSAY OF LITHIUM    Reviewed

 

                    2011 12:00 AM    B12 Injection Ndc#[address]-19  [facility]    Reviewed

 

                    2015 12:00 AM    THER/PROPH/DIAG INJ SC/IM    Reviewed

 

                    2015 12:00 AM    B12 Injection, Up to 1000 Mcg NDC#4727-7412-27    Reviewed

 

                    2015 12:00 AM    COMPLETE CBC W/AUTO DIFF WBC    Reviewed

 

                    2015 12:00 AM    COMPREHEN METABOLIC PANEL    Reviewed

 

                    2015 12:00 AM    LIPID PANEL         Reviewed

 

                    2015 12:00 AM    ASSAY OF LITHIUM    Reviewed

 

                    2011 12:00 AM    VIT D 1 25-DIHYDROXY    Reviewed

 

                    2011 12:00 AM    VITAMIN B-12        Reviewed

 

                    2015 12:00 AM    B12 Injection, Up to 1000 Mcg NDC#9246-7239-89    Reviewed

 

                    2015 12:00 AM    THER/PROPH/DIAG INJ SC/IM    Reviewed

 

                    2015 12:00 AM    B12 Injection, Up to 1000 Mcg NDC#7610-4783-03    Reviewed

 

                    2011 12:00 AM    THER/PROPH/DIAG INJ SC/IM    Reviewed

 

                    2011 12:00 AM    B12 Injection ([facility]) Ndc#4634-9241-61    Reviewed

 

                    2015 12:00 AM    THER/PROPH/DIAG INJ SC/IM    Reviewed

 

                    2015 12:00 AM    B12 Injection, Up to 1000 Mcg NDC#5416-4728-15    Reviewed







Results Summary







                          Data and Description      Results

 

                          2004 12:00 AM        Colonoscopy-Women and Men over 50 Normal 

 

                          2008 12:00 AM         Pap Smear Declined 

 

                          10/7/2009 12:00 AM        Cholest Cry Stone Ql .0 %LDLc SerPl-mCnc 123.0 mg/dLHDLc

 SerPl-mCnc 34.0 mg/dLTrigl SerPl-mCnc 190.0 mg/dLGlucose SerPl-mCnc 78.0 mg/dL

 

                          2009 12:00 AM        Mammogram -Women over 40 Normal HIV1+2 Ab Ser Ql no risk 

 

                          2010 8:47 AM         Dexa Bone Scan Refused Aspirin reccommended Contraindication 



 

                          2010 8:48 AM         Depression Done 

 

                          2010 12:00 AM         Foot Exam-Diabetic Done 

 

                          2010 12:00 AM         Cholest Cry Stone Ql .0 %LDLc SerPl-mCnc 126.0 mg/dLGlucose

 SerPl-mCnc 102.0 mg/dL

 

                          2010 8:45 AM          TRIGLYCERIDES 122.0 mg/dLCHOLESTEROL 186.0 mg/dLHDL 36.0 mg/dLLDL

 (CALC) 126.0 mg/dLGLUCOSE 102.0 mg/dLSODIUM 143.0 mmol/LPOTASSIUM 3.70 
mmol/LCHLORIDE 111.0 mmol/LCO2 23.0 mmol/LBUN 10.0 mg/dLCREATININE 0.80 
mg/dLSGOT/AST 12.0 IU/LSGPT/ALT 11.0 IU/LALK PHOS 65.0 IU/LTOTAL PROTEIN 7.20 
g/dLALBUMIN 3.90 g/dLTOTAL BILI 0.50 mg/dLCALCIUM 10.20 mg/dLeGFR >60 
mL/min/1.73 m2WBC 5.7 RBC 3.26 HGB 10.60 g/dLHCT 31.70 %MCV 97.0 fLMCH 32.50 
pgMCHC 33.40 g/dLRDW CV 13.30 %MPV 9.70 fLPLT 287 %NEUT 62.90 %%LYMP 21.80 
%%MONO 9.90 %%EOS 5.0 %%BASO 0.40 %#NEUT 3.56 #LYMP 1.23 #MONO 0.56 #EOS 0.28 
#BASO 0.02 

 

                          2010 12:00 AM        Glucose SerPl-mCnc 96.0 mg/dLCholest Cry Stone Ql .0 %LDLc

 SerPl-mCnc 146.0 mg/dL

 

                          2010 8:26 AM         TRIGLYCERIDES 106.0 mg/dLCHOLESTEROL 199.0 mg/dLHDL 32.0 mg/dLLDL

 (CALC) 146.0 mg/dLGLUCOSE 96.0 mg/dLSODIUM 143.0 mmol/LPOTASSIUM 4.0 
mmol/LCHLORIDE 113.0 mmol/LCO2 24.0 mmol/LBUN 13.0 mg/dLCREATININE 1.0 
mg/dLSGOT/AST 11.0 IU/LSGPT/ALT 6.0 IU/LALK PHOS 56.0 IU/LTOTAL PROTEIN 6.60 
g/dLALBUMIN 3.80 g/dLTOTAL BILI 0.50 mg/dLCALCIUM 9.30 mg/dLeGFR 57 

 

                          10/6/2010 12:00 AM        Cholest Cry Stone Ql .0 %LDLc SerPl-mCnc 111.0 mg/dLGlucose

 SerPl-mCnc 81.0 mg/dL

 

                          10/6/2010 2:45 PM         TRIGLYCERIDES 123.0 mg/dLCHOLESTEROL 178.0 mg/dLHDL 42.0 mg/dLLDL

 (CALC) 111.0 mg/dLGLUCOSE 81.0 mg/dLSODIUM 139.0 mmol/LPOTASSIUM 4.10 
mmol/LCHLORIDE 106.0 mmol/LCO2 24.0 mmol/LBUN 13.0 mg/dLCREATININE 0.90 
mg/dLSGOT/AST 13.0 IU/LSGPT/ALT 11.0 IU/LALK PHOS 61.0 IU/LTOTAL PROTEIN 7.10 
g/dLALBUMIN 3.90 g/dLTOTAL BILI 0.30 mg/dLCALCIUM 9.30 mg/dLeGFR >60 mL/min/1.73
 m2WBC 6.9 RBC 3.59 HGB 11.50 g/dLHCT 35.30 %MCV 98.0 fLMCH 32.0 pgMCHC 32.60 
g/dLRDW CV 12.90 %MPV 9.90 fLPLT 311 %NEUT 64.90 %%LYMP 22.50 %%MONO 7.20 %%EOS 
5.10 %%BASO 0.30 %#NEUT 4.45 #LYMP 1.54 #MONO 0.49 #EOS 0.35 #BASO 0.02 

 

                          2011 12:00 AM         Mammogram -Women over 40 Ordered 

 

                          2011 10:25 AM        TRIGLYCERIDES 111.0 mg/dLCHOLESTEROL 195.0 mg/dLHDL 43.0 mg/dLLDL

 (CALC) 130.0 mg/dLWBC 5.3 RBC 3.76 HGB 12.0 g/dLHCT 37.80 %.0 fLMCH 
31.90 pgMCHC 31.70 g/dLRDW CV 13.0 %MPV 9.70 fLPLT 259 %NEUT 69.0 %%LYMP 17.60 
%%MONO 8.30 %%EOS 4.70 %%BASO 0.40 %#NEUT 3.63 #LYMP 0.93 #MONO 0.44 #EOS 0.25 
#BASO 0.02 GLUCOSE 102.0 mg/dLSODIUM 146.0 mmol/LPOTASSIUM 4.20 mmol/LCHLORIDE 
113.0 mmol/LCO2 23.0 mmol/LBUN 15.0 mg/dLCREATININE 1.0 mg/dLSGOT/AST 12.0 
IU/LSGPT/ALT 17.0 IU/LALK PHOS 60.0 IU/LTOTAL PROTEIN 6.90 g/dLALBUMIN 4.20 
g/dLTOTAL BILI 0.40 mg/dLCALCIUM 9.70 mg/dLeGFR 57 

 

                          2011 11:49 AM        Cholest Cry Stone Ql .0 %LDLc SerPl-mCnc 130.0 mg/dLHDLc

 SerPl-mCnc 43.0 mg/dLTrigl SerPl-mCnc 111.0 mg/dLGlucose SerPl-mCnc 102.0 mg/dL

 

                          2011 11:52 AM        Pap Smear Declined 

 

                          2011 11:28 AM        Lithium 2.080 mmol/LGLUCOSE 102.0 mg/dLSODIUM 135.0 mmol/LPOTASSIUM

 3.90 mmol/LCHLORIDE 106.0 mmol/LCO2 21.0 mmol/LBUN 12.0 mg/dLCREATININE 1.30 
mg/dLCALCIUM 10.70 mg/dLeGFR 42 

 

                          2011 8:58 AM          Lithium 0.690 mmol/L

 

                          2011 2:38 PM         VITAMIN B12 3483.0 pg/mL

 

                          2013 3:35 PM          WBC 5.1 RBC 3.73 HGB 11.70 g/dLHCT 36.40 %MCV 98.0 fLMCH 31.40

 pgMCHC 32.10 g/dLRDW CV 13.10 %MPV 9.80 fLPLT 224 %NEUT 66.80 %%LYMP 19.10 
%%MONO 9.0 %%EOS 4.90 %%BASO 0.20 %#NEUT 3.42 #LYMP 0.98 #MONO 0.46 #EOS 0.25 
#BASO 0.01 GLUCOSE 88.0 mg/dLSODIUM 141.0 mmol/LPOTASSIUM 4.10 mmol/LCHLORIDE 
110.0 mmol/LCO2 22.0 mmol/LBUN 22.0 mg/dLCREATININE 1.10 mg/dLCALCIUM 9.80 
mg/dLeGFR 50 Lithium 0.760 mmol/L

 

                          2013 11:02 AM        TRIGLYCERIDES 106.0 mg/dLCHOLESTEROL 181.0 mg/dLHDL 46.0 mg/dLLDL

 (CALC) 114.0 mg/dLVITAMIN D 41.10 ng/mL

 

                          10/17/2014 10:10 AM       WBC 5.0 RBC 3.66 HGB 11.60 g/dLHCT 36.80 %.0 fLMCH 31.70

 pgMCHC 31.50 g/dLRDW CV 13.50 %MPV 10.10 fLPLT 209 %NEUT 69.20 %%LYMP 21.0 
%%MONO 6.40 %%EOS 3.20 %%BASO 0.20 %#NEUT 3.46 #LYMP 1.05 #MONO 0.32 #EOS 0.16 
#BASO 0.01 GLUCOSE 100.0 mg/dLSODIUM 148.0 mmol/LPOTASSIUM 3.90 mmol/LCHLORIDE 
114.0 mmol/LCO2 26.0 mmol/LBUN 12.0 mg/dLCREATININE 1.20 mg/dLSGOT/AST 9.0 
IU/LSGPT/ALT <6 IU/LALK PHOS 82.0 IU/LTOTAL PROTEIN 6.90 g/dLALBUMIN 4.0 
g/dLTOTAL BILI 0.40 mg/dLCALCIUM 10.50 mg/dLeGFR 45 TRIGLYCERIDES 96.0 
mg/dLCHOLESTEROL 155.0 mg/dLHDL 38.0 mg/dLLDL (CALC) 98.0 mg/dLLithium 0.850 
mmol/LCancer Antigen (CA) 125 8.30 U/mL

 

                          2015 10:25 AM        Lithium 0.790 mmol/LWBC 4.8 RBC 3.44 HGB 11.0 g/dLHCT 35.20 

%.0 fLMCH 32.0 pgMCHC 31.30 g/dLRDW CV 14.0 %MPV 9.30 fLPLT 210 %NEUT 
70.80 %%LYMP 17.20 %%MONO 8.10 %%EOS 3.50 %%BASO 0.40 %#NEUT 3.41 #LYMP 0.83 
#MONO 0.39 #EOS 0.17 #BASO 0.02 TRIGLYCERIDES 107.0 mg/dLCHOLESTEROL 174.0 
mg/dLHDL 43.0 mg/dLLDL (CALC) 110.0 mg/dLGLUCOSE 90.0 mg/dLSODIUM 145.0 
mmol/LPOTASSIUM 3.80 mmol/LCHLORIDE 115.0 mmol/LCO2 24.0 mmol/LBUN 17.0 mg
/dLCREATININE 1.30 mg/dLSGOT/AST 18.0 IU/LSGPT/ALT 17.0 IU/LALK PHOS 56.0 
IU/LTOTAL PROTEIN 6.70 g/dLALBUMIN 3.90 g/dLTOTAL BILI 0.40 mg/dLCALCIUM 9.80 
mg/dLeGFR 41 







History Of Immunizations







       Name    Date Admin    Mfg Name    Mfg Code    Trade Name    Lot#    Route    Inj    Vis Given    Vis

 Pub                                    CVX

 

        Influenza    2008    Not Entered    NE      Not Entered            Not Entered    Not Entered

                    1            999

 

          Pneumococcal    2008    [employer]    MSD       Pneumovax 23              Intramuscular

                Not Entered     1        999

 

           Influenza    10/15/2010    [employer].    NOV        Fluvirin > 12 Years    

[id number]     Intramuscular    Left Deltoid    10/15/2010    2009    999

 

          Pneumococcal    3/23/2015    [facility]    [facility]       Prevnar 13    [id number]    Intramuscular

                Right Gluteous Medius    3/23/2015       2013       109







History of Past Illness







                    Name                Date of Onset       Comments

 

                    Peritoneal Neoplasm, Malignant                         

 

                    Hyperlipidemia                           

 

                    Bipolar disorder, unspecified                         

 

                    Artificial opening status; colostomy                         

 

                    B12 deficiency                           

 

                    Anemia, Pernicious                         

 

                    Arthritis unspecified                         

 

                    cervical cancer                          

 

                    Artificial opening status; colostomy    2010  1:10PM     

 

                    Bipolar disorder, unspecified    2010  1:10PM     

 

                    Hyperlipidemia      2010  1:10PM     

 

                    Anemia, Pernicious    2010  1:10PM     

 

                    Postoperative Follow-Up    2010  1:55PM     

 

                    Postoperative Follow-Up    Mar  8 2010 10:57AM     

 

                    Artificial opening status; colostomy    Mar  8 2010  1:19PM     

 

                    Peritoneal Neoplasm, Malignant    Mar  8 2010  1:19PM     

 

                    Artificial opening status; colostomy    2010  1:40PM     

 

                    Hyperlipidemia      2010  1:40PM     

 

                    Anemia, Pernicious    2010  1:40PM     

 

                    Peritoneal Neoplasm, Malignant    2010  1:40PM     

 

                    Arthritis unspecified    2010  1:40PM     

 

                    Anemia of Chronic Illness    2010  1:40PM     

 

                    Tinea corporis      2010  3:17PM     

 

                    Bipolar disorder, unspecified    2010  1:33PM     

 

                    Hyperlipidemia      2010  1:33PM     

 

                    Anemia, Pernicious    2010  1:33PM     

 

                    Peritoneal Neoplasm, Malignant    2010  1:33PM     

 

                    B12 deficiency      2010  1:33PM     

 

                    Ethmoidal Sinusitis, Acute    Sep 21 2010  3:53PM     

 

                    Wheezing            Sep 21 2010  3:53PM     

 

                    Flu                 Oct 15 2010  1:40PM     

 

                    Bipolar disorder, unspecified    Oct 15 2010  1:42PM     

 

                    Hyperlipidemia      Oct 15 2010  1:42PM     

 

                    Anemia, Pernicious    Oct 15 2010  1:42PM     

 

                    Peritoneal Neoplasm, Malignant    Oct 15 2010  1:42PM     

 

                    Bipolar disorder, unspecified    2011 12:01PM     

 

                    Hyperlipidemia      2011 12:01PM     

 

                    Anemia, Pernicious    2011 12:01PM     

 

                    Peritoneal Neoplasm, Malignant    2011 12:01PM     

 

                    Bipolar disorder, unspecified    Apr 15 2011 10:55AM     

 

                    Major Depression    2011 10:11AM     

 

                    Bipolar Disorder    2011 10:11AM     

 

                    Cancer              May 10 2011  4:16PM     

 

                    Major Depression    May 10 2011  3:16PM     

 

                    Bipolar Disorder    May 10 2011  3:16PM     

 

                    Hypercalcemia       May 23 2011  2:47PM     

 

                    Bipolar disorder, unspecified    May 23 2011  2:47PM     

 

                    Colon Cancer, Personal History    May 23 2011  2:47PM     

 

                    Bipolar Disorder    May 31 2011  4:39PM     

 

                    Depressive Disorder    2011 10:01AM     

 

                    Vitamin B12 deficiency    2011 10:01AM     

 

                    Vitamin D Deficiency    2011  5:07PM     

 

                    Anemia, Vitamin B12 Deficiency    2011  5:07PM     

 

                    B12 deficiency      2011  3:56PM     

 

                    Routine gynecological examination    Aug  4 2011  9:08AM     

 

                    Screening Examination for Breast Cancer    Aug  4 2011  9:08AM     

 

                    Tinea Corporis      Aug  4 2011  9:08AM     

 

                    Depressive Disorder    Sep 23 2011  8:47AM     

 

                    Contact Dermatitis    Sep 23 2011  8:47AM     

 

                    Anemia, Pernicious    Sep 23 2011  8:47AM     

 

                    B12 deficiency      Sep 23 2011  8:47AM     

 

                    B12 deficiency      Sep 27 2011  2:58PM     

 

                    B12 deficiency      Oct 20 2011  2:34PM     

 

                    Flu                 Dec  9 2011  3:16PM     

 

                    B12 deficiency      Dec  9 2011  3:17PM     

 

                    B12 deficiency      2012  4:52PM     

 

                    B12 deficiency      2012 11:10AM     

 

                    B12 deficiency      2012  3:37PM     

 

                    B12 deficiency      May  3 2012  4:10PM     

 

                    B12 deficiency      2012  2:54PM     

 

                    B12 deficiency      2012 11:23AM     

 

                    B12 deficiency      Aug  9 2012  2:08PM     

 

                    B12 deficiency      Sep  6 2012  4:36PM     

 

                    B12 deficiency      Oct 16 2012 10:23AM     

 

                    Flu                 Feb  2013  3:11PM     

 

                    Bipolar disorder, unspecified    Feb  2013  2:48PM     

 

                    Anemia, Pernicious    Feb  2013  2:48PM     

 

                    B12 deficiency      Feb  2013  2:48PM     

 

                    Extrapyramidal abnormal movement disorder    Feb  4   2:48PM     

 

                    B12 deficiency      Apr  3 2013 12:03PM     

 

                    Bipolar disorder, unspecified    May  7 2013  1:31PM     

 

                    Anemia, Pernicious    May  7 2013  1:31PM     

 

                    B12 deficiency      May  7 2013  1:31PM     

 

                    Extrapyramidal abnormal movement disorder    May  7 2013  1:31PM     

 

                    B12 deficiency      2013  3:42PM     

 

                    B12 deficiency      2013  1:31PM     

 

                    Hyperlipidemia      Aug  7 2013 10:37AM     

 

                    Vitamin D Deficiency    Aug  7 2013 10:37AM     

 

                    Bipolar disorder, unspecified    Aug  7 2013 10:37AM     

 

                    Anemia, Pernicious    Aug  7 2013 10:37AM     

 

                    B12 deficiency      Aug  7 2013 10:37AM     

 

                    B12 deficiency      Sep 25 2013 11:15AM     

 

                    B12 deficiency      Dec 11 2013  3:16PM     

 

                    B12 deficiency      Mar  6 2014  1:48PM     

 

                    B12 deficiency      May 21 2014  3:17PM     

 

                    Screening Examination for Breast Cancer    2014  3:23PM     

 

                    Periumbilical abdominal pain    2014  3:23PM     

 

                    B12 deficiency      Jul 10 2014  2:52PM     

 

                    Anemia, Vitamin B12 Deficiency    Aug 13 2014  4:50PM     

 

                    Bipolar disorder    Oct 16 2014 11:13AM     

 

                    Hyperlipidemia      Oct 16 2014 11:13AM     

 

                    Anemia, Pernicious    Oct 16 2014 11:13AM     

 

                    Peritoneal Neoplasm, Malignant    Oct 16 2014 11:13AM     

 

                    Screening breast examination    Oct 16 2014 11:13AM     

 

                    Weight loss         Oct 16 2014 11:13AM     

 

                    Anemia, Pernicious    Mar 23 2015  2:57PM     

 

                    B12 deficiency      Mar 23 2015  2:57PM     

 

                    Need for Prevnar vaccine    Mar 23 2015  2:57PM     

 

                    Bipolar disorder    Mar 23 2015  2:57PM     

 

                    Hyperlipidemia      Mar 23 2015  2:57PM     

 

                    Anemia, Pernicious    Mar 23 2015  2:57PM     

 

                    Peritoneal Neoplasm, Malignant    Mar 23 2015  2:57PM     

 

                    B12 deficiency      May  4 2015  4:48PM     

 

                    Hyperlipidemia      May 13 2015  9:56AM     

 

                    Anemia              May 13 2015  9:56AM     

 

                    Bipolar disorder    May 13 2015  9:56AM     

 

                    Bipolar disorder    May 14 2015  3:27PM     

 

                    Hyperlipidemia      May 14 2015  3:27PM     

 

                    Anemia, Pernicious    May 14 2015  3:27PM     

 

                    Peritoneal Neoplasm, Malignant    May 14 2015  3:27PM     

 

                    B12 deficiency      2015  2:20PM     

 

                    B12 deficiency      2015 11:34AM     

 

                    B12 deficiency      Aug 18 2015  9:06AM     

 

                    Tinea Corporis      Sep 18 2015  8:54AM     

 

                    B12 deficiency      Sep 18 2015  8:54AM     







Payers







           Insurance Name    Company Name    Plan Name    Plan Number    Policy Number    Policy Group

 Number                                 Start Date

 

                    [employer]    [employer]              [id number]              N/A

 

                    Medicare Part A    Medicare Part A              [id number]              N/A

 

                    Medicare Part B    Medicare Of [place]              [id number]              2006

 

                          [employer]    [employer]                 50 percent

                                                    2009







History of Encounters







                    Visit Date          Visit Type          Provider

 

                    2015           Office visit        Bhupinder Aspen DO

 

                    2015           Nurse visit         Bhupinder Aspen DO

 

                    2015            Nurse visit         Bhupinder Aspen DO

 

                    2015            Nurse visit         Bhupinder Aspen DO

 

                    2015           Office visit        Bhupinder Aspen DO

 

                    2015            Nurse visit         Bhupinder Aspen DO

 

                    3/23/2015           Office visit        Bhupinder Aspen DO

 

                    10/16/2014          Office visit        Bhupinder Aspen DO

 

                    2014           Nurse visit         Radha Ontiveros APRN

 

                    7/10/2014           Nurse visit         Bhupinder Aspen DO

 

                    2014           Office visit        Bhupinder Aspen DO

 

                    2014           Nurse visit         Bhupinder Aspen DO

 

                    3/6/2014            Nurse visit         Bhupinder Aspen DO

 

                    2014            [facility]            EARNEST Lopez MD

 

                    2013          Nurse visit         Bhupinder Aspen DO

 

                    2013           Nurse visit         Bhupinder Aspen DO

 

                    2013            Office visit        Bhupinder Aspen DO

 

                    2013            Nurse visit         Bhupinder Aspen DO

 

                    2013            Nurse visit         Bhupinder Aspen DO

 

                    2013            Office visit        Bhupinder Aspen DO

 

                    4/3/2013            Nurse visit         Bhupinder Aspen DO

 

                    2013            Office visit        Bhupinder Aspen DO

 

                    10/16/2012          Nurse visit         Bhupinder Aspen DO

 

                    2012            Nurse visit         Bhupinder Aspen DO

 

                    2012            Voided              Bhupinder Aspen DO

 

                    2012            Nurse visit         Bhupinder Aspen DO

 

                    2012            Nurse visit         Bhupinder Aspen DO

 

                    2012           Nurse visit         Bhupinder Aspen DO

 

                    5/3/2012            Nurse visit         Bhupinder Aspen DO

 

                    2012           Nurse visit         Bhupinder Aspen DO

 

                    2012           Nurse visit         Bhupinder Aspen DO

 

                    2012           Nurse visit         Bhupinder Aspen DO

 

                    2011           Nurse visit         Bhupinder Aspen DO

 

                    10/20/2011          Nurse visit         Bhupinder Aspen DO

 

                    2011           Office visit        Bhupinder Aspen DO

 

                    2011           Nurse visit         Radha GREEN

 

                    2011            Office visit        Bhupinder Aspen DO

 

                    2011           Nurse visit         Bhupinder Aspen DO

 

                    2011            Office visit        Bhupinder Aspen DO

 

                    2011           Office visit        Bhupinder Aspen DO

 

                    5/10/2011           Office visit        Bhupinder Aspen DO

 

                    2011           Office visit        Bhupinder Aspen DO

 

                    4/15/2011           Office visit        Devin Angel DO

 

                    2011           Office visit        Devin Angel DO

 

                    10/15/2010          Office visit        Devin Angel DO

 

                    2010           Office visit        Devin Angel DO

 

                    2010            Office visit        Devin Angel DO

 

                    2010           Office visit        Devin Angel DO

 

                    2010            Office visit        Devin Angel DO

 

                    3/8/2010            Office visit        Devin Masterson MD

 

                    2010            Surgery             Devin Masterson MD

 

                    2010            Office visit        Devin Angel DO

 

                    2010           Surgery             Devin Masterson MD

 

                    2010           Hospital            Devin Masterson MD

 

                    2010           [facility]            Devin Masterson MD

 

                    10/22/2009          Office visit        Devin Angel DO

## 2019-06-26 NOTE — XMS REPORT
MU2 Ambulatory Summary

                             Created on: 2016



Pauline Gan

External Reference #: [id number]

: 1950

Sex: Female



Demographics







                          Address                   [address]

[place]  [address]

 

                          Home Phone                [phone]

 

                          Preferred Language        English

 

                          Marital Status            Legally 

 

                          Buddhism Affiliation     Unknown

 

                          Race                      White

 

                          Ethnic Group              Not  or 





Author







                          Bret Hernandez

 

                          [facility]

 

                          Address                   [address]

[place]  [id number]



 

                          Phone                     [phone]







Care Team Providers







                    Care Team Member Name    Role                Phone

 

                    Bret Forte    PCP                 [phone]

 

                    Bhupinder Louise    PreferredProvider    Unavailable







Allergies and Adverse Reactions







                    Name                Reaction            Notes

 

                    NO KNOWN DRUG ALLERGIES                         







Plan of Treatment







             Planned Activity    Comments     Planned Date    Planned Time    Plan/Goal

 

             Injection, Subcutaneous/IM                 2016    12:00 AM      

 

             Injection, Subcutaneous/IM                 2016    12:00 AM      

 

             Mammography; bilateral                 2016    12:00 AM      

 

             Injection, Subcutaneous/IM                 2016    12:00 AM      

 

             CBC with Auto                 2013     12:00 AM      

 

             Lithium                   2013     12:00 AM      

 

             Basic metabolic panel                 2013     12:00 AM      

 

             Vitamin B12 (cyanocobalamin)                 2013     12:00 AM      

 

             Folic acid, serum                 2013     12:00 AM      

 

             Injection,Subcutaneous/Intramuscul                 2013    12:00 AM      







Medications







                                        Active 

 

             Name         Start Date    Estimated Completion Date    SIG          Comments

 

                Latuda 20 mg oral tablet                                    take 1 tablet (20 mg) by oral route once daily with

 food (at least 350 calories)            

 

             pravastatin 40 mg oral tablet    3/30/2015                 TAKE 1 TABLET BY MOUTH DAILY     

 

                Namenda XR 28 mg oral capsule,sprinkle,ER 24hr    2015                       take 1 capsule (28

 mg) by oral route once daily            

 

                Namenda XR 28 mg oral capsule,sprinkle,ER 24hr    2016                       take 1 capsule (28

 mg) by oral route once daily            

 

                triamcinolone acetonide 0.1 % topical cream    2016                        apply a thin layer to 

the affected area(s) by topical route 2 times per day     

 

                potassium chloride 10 mEq oral tablet extended release    2016                       take 1 tablet

 (10 meq) by oral route once daily       

 

             pravastatin 40 mg oral tablet    2016                 TAKE 1 TABLET BY MOUTH DAILY     

 

                Vitamin B-12 1,000 mcg/mL injection solution    2016                       inject 1 milliliter 

(1,000 mcg) by intramuscular route once a month     









                                         

 

             Name         Start Date    Expiration Date    SIG          Comments

 

             Reglan 10mg    3/29/2010    2010    one ac and hs     

 

                Keflex 500 mg oral capsule    2010       10/1/2010       take 1 capsule (500 mg) by oral

 route every 6 hours for 10 days         

 

                Bactrim -160 mg oral tablet    2011       take 1 tablet by oral route

 every 12 hours for 7 days               

 

                triamcinolone acetonide 0.1 % topical cream    2011      apply a thin

 layer to the affected area(s) by topical route 2 times per day     

 

                sertraline 100 mg oral tablet    4/10/2012       5/10/2012       take 1.5 tablets by oral route

 daily for 30 days                       

 

                ergocalciferol (vitamin D2) 50,000 unit oral capsule    4/15/2013       2013       TAKE

 ONE CAPSULE BY MOUTH ONCE A WEEK        

 

                CYANOCOBALAM 1000MCGINJ 1000 milliliter    2013       INJECT 1ML INTRAMUSCULAR

 ONCE A MONTH                            

 

                pravastatin 40 mg oral tablet    3/25/2014       3/20/2015       TAKE ONE TABLET BY MOUTH EVERY

 DAY                                     

 

                          Zostavax (PF) 19,400 unit/0.65 mL subcutaneous suspension for reconstitution    3/23/2015

                    3/24/2015           inject 0.65 milliliter by subcutaneous route once     

 

                famciclovir 500 mg oral tablet    12/3/2015       12/10/2015      take 1 tablet (500 mg) by

 oral route every 8 hours for 7 days     

 

                furosemide 40 mg oral tablet    2016      take 1 tablet (40 mg) by oral

 route once daily                        

 

                Cipro 500 mg oral tablet    2016       take 1 tablet (500 mg) by oral route

 2 times per day for 5 days              









                                        Discontinued 

 

             Name         Start Date    Discontinued Date    SIG          Comments

 

                Tylenol 325 mg oral tablet                    2013        take 1 - 2 tablets (325 -650 mg) by oral

 route every 4-6 hours as needed         

 

                Calcium 600 + D(3) 600 mg(1,500mg) -400 unit oral tablet                    2011       take 1 tablet

 by oral route 2 times a day            no longer taking

 

                Vitamin B-12 1,000 mcg oral tablet extended release    2010       take 1

 tablet by oral route daily             no longer taking

 

                Antifungal (clotrimazole) 1 % topical cream    2010       apply to the 

affected and surrounding areas of skin by topical route 2 times per day morning 
and evening                              

 

                sertraline 100 mg oral tablet    5/10/2011       2011       take 2 tablets (200 mg) by 

oral route once daily                   discontinued by Dr. Serrano

 

                mirtazapine 15 mg oral tablet                    2011        take 1 tablet (15 mg) by oral route 

once daily before bedtime               Dr. Serrano

 

                mirtazapine 15 mg oral tablet                    2011        take 1 tablet (15 mg) by oral route 

once daily before bedtime               dc'd by Dr. Serrano

 

                Pristiq 50 mg oral tablet extended release 24 hr                    2013        take 1 tablet (50

 mg) by oral route once daily           Dr. Serrano

 

                Pristiq 50 mg oral tablet extended release 24 hr                    2013        take 1 tablet (50

 mg) by oral route once daily           dose updated

 

                Vitamin B-12 1,000 mcg/mL injection solution    2011        inject 1 milliliter

 (1,000 mcg) by intramuscular route once a month    on list already

 

                    syringe with needle 1 mL 25 gauge x 1" miscellaneous syringe    2011

                          use for injection once a month     

 

                clotrimazole 1 % topical cream    2011        apply to the affected and surrounding

 areas of skin by topical route 2 times per day in the morning and evening     

 

                Vitamin D2 50,000 unit oral capsule    2011        take 1 capsule (50,000

 unit) by oral route once weekly        generic on list

 

                Pravachol 40 mg oral tablet    2012        take 1 tablet (40 mg) by oral 

route once daily for 90 days            generic on list

 

                lithium carbonate 300 mg oral capsule    2012        take 1 capsule by oral

 route daily                            dose updated

 

                Pristiq 100 mg oral tablet extended release 24 hr                    4/10/2012       take 1 and 1/2 

tablet (150 mg) by oral route once daily    Mental Health provider

 

                Pristiq 100 mg oral tablet extended release 24 hr                    4/10/2012       take 1 and 1/2 

tablet (150 mg) by oral route once daily    Discontinued by Dr Efrain [facility] at [facility]

 

                hydroxyzine HCl 50 mg oral tablet    10/16/2014      2015       take 1 tablet (50 mg) 

by oral route at bedtime                 

 

                lithium carbonate 300 mg oral capsule    2015       take 1 capsule (300

 mg) by oral route 2 for 30 days         

 

                fluconazole 100 mg oral tablet    2015       12/3/2015       take 1 tablet (100 mg) by 

oral route once a week                   

 

                ketoconazole 2 % topical cream    2015       12/3/2015       apply to the affected area(s)

 by topical route 2 times per day        

 

                prednisone 10 mg oral tablet    12/3/2015       2016        take 2 tablets (20 mg) by oral

 route once daily for 4 days 1 tablet daily for 4 days 0.5 tablet daily for 4 
days                                     







Problem List







                    Description         Status              Onset

 

                    Artificial opening status; colostomy    Active               

 

                    Bipolar disorder, unspecified    Active               

 

                    Hyperlipidemia      Active               

 

                    Peritoneal Neoplasm, Malignant    Active               

 

                    Anemia, Pernicious    Active               

 

                    Arthritis unspecified    Active               

 

                    B12 deficiency      Active               







Vital Signs







      Date    Time    BP-Sys(mm[Hg]    BP-Lynn(mm[Hg])    HR(bpm)    RR(rpm)    Temp    WT    HT    HC    BMI

                    BSA                 BMI Percentile      O2 Sat(%)

 

       2016    3:11:00 PM    134 mmHg    76 mmHg    80 bpm    20 rpm    98 F    163 lbs    69 in     

                24.07 kg/m2     1.90 m2                         98 %

 

        2016    2:04:00 PM    142 mmHg    86 mmHg    68 bpm    16 rpm    98.5 F    166 lbs    63 in

                          29.4053 kg/m    1.8295 m                 100 %

 

        2016    11:27:00 AM    148 mmHg    78 mmHg    90 bpm    20 rpm    98.2 F    153 lbs    69 in

                          22.59 kg/m2    1.84 m2                   96 %

 

        12/3/2015    9:50:00 AM    132 mmHg    70 mmHg    62 bpm    16 rpm    97.9 F    145 lbs    69 in

                          21.4125 kg/m    1.7894 m                 100 %

 

        2015    8:52:00 AM    132 mmHg    68 mmHg    52 bpm    20 rpm    97.8 F    141 lbs    69 in

                          20.82 kg/m2    1.76 m2                   100 %

 

        2015    3:25:00 PM    120 mmHg    62 mmHg    72 bpm    16 rpm    98.1 F    136 lbs    69 in

                          20.0835 kg/m    1.733 m                 98 %

 

       3/23/2015    2:55:00 PM    130 mmHg    76 mmHg    68 bpm    18 rpm    97 F    140 lbs    69 in    

                20.67 kg/m2     1.76 m2                         98 %

 

        10/16/2014    11:11:00 AM    120 mmHg    66 mmHg    77 bpm    20 rpm    98 F    130 lbs    69 in

                          19.1974 kg/m    1.6943 m                 100 %

 

        2014    3:21:00 PM    130 mmHg    66 mmHg    63 bpm    18 rpm    97.2 F    160 lbs    69 in

                          23.63 kg/m2    1.88 m2                   99 %

 

        2013    10:35:00 AM    132 mmHg    70 mmHg    66 bpm    20 rpm    98.1 F    157 lbs    69 in

                          23.1846 kg/m    1.862 m                  

 

        2013    1:29:00 PM    132 mmHg    70 mmHg    76 bpm    18 rpm    98.2 F    166 lbs    69 in 

                          24.51 kg/m2    1.91 m2                    

 

       2013    2:46:00 PM    128 mmHg    70 mmHg    76 bpm    16 rpm    98 F    160 lbs    69 in     

                23.6276 kg/m    1.8797 m                       

 

        2011    8:49:00 AM    128 mmHg    78 mmHg    70 bpm    18 rpm    97.9 F    164 lbs    69 in

                          24.22 kg/m2    1.90 m2                    

 

     2011    1:31:00 PM    132 mmHg    68 mmHg    84 bpm         97 F    167 lbs                        

                                         

 

        2011    9:09:00 AM    128 mmHg    70 mmHg    72 bpm    18 rpm    98.2 F    163 lbs    64 in 

                          27.98 kg/m2    1.83 m2                    

 

       2011    10:01:00 AM    132 mmHg    70 mmHg    72 bpm    18 rpm    98.2 F    154 lbs             

                                                                 

 

       2011    2:47:00 PM    128 mmHg    70 mmHg    72 bpm    18 rpm    97.8 F    156 lbs             

                                                                 

 

       5/10/2011    3:16:00 PM    144 mmHg    80 mmHg    72 bpm    18 rpm    98.2 F    158 lbs             

                                                                 

 

        2011    10:11:00 AM    132 mmHg    70 mmHg    70 bpm    18 rpm    98.2 F    168 lbs    69 in

                          24.81 kg/m2    1.93 m2                    

 

        4/15/2011    10:52:00 AM    110 mmHg    60 mmHg    75 bpm    16 rpm    97.5 F    172.375 lbs    

69 in                     25.4551 kg/m    1.951 m                 100 %

 

        2011    11:43:00 AM    120 mmHg    82 mmHg    75 bpm    16 rpm    97.2 F    178.5 lbs    69

 in                       26.36 kg/m2    1.99 m2                   100 %

 

        10/15/2010    1:32:00 PM    120 mmHg    70 mmHg    80 bpm    18 rpm    96.6 F    177 lbs    69 in

                          26.1381 kg/m    1.977 m                 100 %

 

        2010    3:50:00 PM    168 mmHg    100 mmHg    82 bpm    18 rpm    97.8 F    177.5 lbs    69

 in                       26.21 kg/m2    1.98 m2                   97 %

 

        2010    1:21:00 PM    140 mmHg    80 mmHg    59 bpm    16 rpm    97.6 F    173.25 lbs    69 

in                        25.5843 kg/m    1.956 m                 100 %

 

        2010    3:02:00 PM    140 mmHg    80 mmHg    61 bpm    16 rpm    97.6 F    173.125 lbs    69

 in                       25.57 kg/m2    1.96 m2                   99 %

 

        2010    1:23:00 PM    130 mmHg    80 mmHg    66 bpm    16 rpm    96.8 F    173 lbs    69 in 

                          25.5474 kg/m    1.9546 m                 100 %

 

        2010    12:58:00 PM    130 mmHg    88 mmHg    75 bpm    16 rpm    98.4 F    172.25 lbs    69

 in                       25.44 kg/m2    1.95 m2                   100 %







Social History







                    Name                Description         Comments

 

                    denies alcohol use                         

 

                    denies smoking                           

 

                    Denies illicit substance abuse                         

 

                    retired                                 direct care

 

                    Single                                   

 

                    Exercises regularly                         

 

                    Attended some college                         

 

                    Tobacco             Never smoker         







History of Procedures







                    Date Ordered        Description         Order Status

 

                    2010 12:00 AM    COMPREHEN METABOLIC PANEL    Reviewed

 

                    2010 12:00 AM    COMPLETE CBC W/AUTO DIFF WBC    Reviewed

 

                    2010 12:00 AM    LIPID PANEL         Reviewed

 

                          2015 12:00 AM        B12 Injection, Up to 1000 Mcg NDC#1984-7515-06 C Medicare 

                                        Reviewed

 

                    2011 12:00 AM    MAMMOGRAM SCREENING    Reviewed

 

                    2011 12:00 AM    CYTOPATH C/V THIN LAYER    Reviewed

 

                    2011 12:00 AM    B12 Injection 1 cc NDC#[address]-71    Reviewed

 

                    2015 12:00 AM    THER/PROPH/DIAG INJ SC/IM    Reviewed

 

                    2015 12:00 AM    B12 Injection, Up to 1000 Mcg NDC#5349-4345-75    Reviewed

 

                    2011 12:00 AM    THER/PROPH/DIAG INJ SC/IM    Reviewed

 

                    2011 12:00 AM    B12 Injection([place]) Ndc#3524-5577-70-    Reviewed

 

                    2015 12:00 AM    THER/PROPH/DIAG INJ SC/IM    Reviewed

 

                    2015 12:00 AM    B12 Injection, Up to 1000 Mcg NDC#4081-7995-20    Reviewed

 

                    10/20/2011 12:00 AM    THER/PROPH/DIAG INJ SC/IM    Reviewed

 

                    10/20/2011 12:00 AM    B12 Injection(Arabella) Ndc#2877-3608-36-    Reviewed

 

                    2016 12:00 AM    THER/PROPH/DIAG INJ SC/IM    Reviewed

 

                    2016 12:00 AM    B12 Injection, Up to 1000 Mcg NDC#1860-1070-39    Reviewed

 

                    3/14/2016 12:00 AM    VITAMIN B-12        Reviewed

 

                    3/15/2016 12:00 AM    THER/PROPH/DIAG INJ SC/IM    Reviewed

 

                    3/15/2016 12:00 AM    B12 Injection, Up to 1000 Mcg NDC#4338-8685-73    Reviewed

 

                    2011 12:00 AM    ***Immunization administration, Medicare flu    Reviewed

 

                    2011 12:00 AM    Fluzone ** MEDICARE Only **    Reviewed

 

                    2011 12:00 AM    THER/PROPH/DIAG INJ SC/IM    Reviewed

 

                    2011 12:00 AM    B12 Injection ([employer] [facility]) Ndc#0056-4808-32    Reviewed

 

                    2016 12:00 AM    B12 Injection, Up to 1000 Mcg NDC#3464-9872-82 [facility] Medicare    

Reviewed

 

                    2016 12:00 AM    TTE W/DOPPLER COMPLETE    Reviewed

 

                    2016 12:00 AM    EXTREMITY STUDY     Returned

 

                          2016 12:00 AM        B12 Injection, Up to 1000 Mcg NDC#9775-1187-88 [facility] 

                                        Reviewed

 

                    2016 12:00 AM    THER/PROPH/DIAG INJ SC/IM    Reviewed

 

                    2012 12:00 AM    THER/PROPH/DIAG INJ SC/IM    Reviewed

 

                    2012 12:00 AM    B12 Injection ([employer]) Ndc#8229-0001-48    Reviewed

 

                    2016 12:00 AM    THER/PROPH/DIAG INJ SC/IM    Reviewed

 

                    2016 12:00 AM    B12 Injection, Up to 1000 Mcg NDC#9168-9108-52    Reviewed

 

                    2012 12:00 AM    THER/PROPH/DIAG INJ SC/IM    Reviewed

 

                    2012 12:00 AM    B12 Injection(Arabella) Ndc#1665-4614-90-    Reviewed

 

                    5/3/2012 12:00 AM    THER/PROPH/DIAG INJ SC/IM    Reviewed

 

                    5/3/2012 12:00 AM    B12 Injection([place]ella) Ndc#5791-4107-04-    Reviewed

 

                    2012 12:00 AM    IMMUNOTHERAPY INJECTIONS    Reviewed

 

                    2012 12:00 AM    B12 Injection(Arabella) Ndc#6600-9740-56-    Reviewed

 

                    2012 12:00 AM    THER/PROPH/DIAG INJ SC/IM    Reviewed

 

                    2012 12:00 AM    B12 Injection, Up to 1000 Mcg NDC#2518-9314-07    Reviewed

 

                    2012 12:00 AM    THER/PROPH/DIAG INJ SC/IM    Reviewed

 

                    2012 12:00 AM    B12 Injection, Up to 1000 Mcg NDC#4795-7025-69    Reviewed

 

                    2012 12:00 AM    THER/PROPH/DIAG INJ SC/IM    Reviewed

 

                    2012 12:00 AM    B12 Injection, Up to 1000 Mcg NDC#5823-0519-43    Reviewed

 

                    10/16/2012 12:00 AM    THER/PROPH/DIAG INJ SC/IM    Reviewed

 

                    10/16/2012 12:00 AM    B12 Injection, Up to 1000 Mcg NDC#7931-7127-23    Reviewed

 

                    2010 12:00 AM    COMPREHEN METABOLIC PANEL    Reviewed

 

                    2010 12:00 AM    COMPLETE CBC W/AUTO DIFF WBC    Reviewed

 

                    2010 12:00 AM    LIPID PANEL         Reviewed

 

                    2013 12:00 AM    Flu Injection 3 Years And Above NDC# [address]-80  [facility]    Reviewed



 

                    2013 12:00 AM    COMPLETE CBC W/AUTO DIFF WBC    Reviewed

 

                    2013 12:00 AM    ASSAY OF LITHIUM    Reviewed

 

                    2013 12:00 AM    METABOLIC PANEL TOTAL CA    Reviewed

 

                    4/3/2013 12:00 AM    THER/PROPH/DIAG INJ SC/IM    Reviewed

 

                    4/3/2013 12:00 AM    B12 Injection, Up to 1000 Mcg NDC#5625-2580-36    Reviewed

 

                    2013 12:00 AM    THER/PROPH/DIAG INJ SC/IM    Reviewed

 

                    2013 12:00 AM    B12 Injection, Up to 1000 Mcg NDC#3414-5346-77    Reviewed

 

                    2013 12:00 AM    THER/PROPH/DIAG INJ SC/IM    Reviewed

 

                    2013 12:00 AM    B12 Injection, Up to 1000 Mcg NDC#2377-9449-90    Reviewed

 

                    2013 12:00 AM    LIPID PANEL         Reviewed

 

                    2013 12:00 AM    VITAMIN D 25 HYDROXY    Reviewed

 

                    2013 12:00 AM    THER/PROPH/DIAG INJ SC/IM    Reviewed

 

                    3/6/2014 12:00 AM    THER/PROPH/DIAG INJ SC/IM    Reviewed

 

                    2014 12:00 AM    THER/PROPH/DIAG INJ SC/IM    Reviewed

 

                    2014 12:00 AM    B12 Injection, Up to 1000 Mcg NDC#1302-9722-46    Reviewed

 

                    2010 12:00 AM    SKIN FUNGI CULTURE    Reviewed

 

                    10/9/2010 12:00 AM    COMPREHEN METABOLIC PANEL    Reviewed

 

                    10/9/2010 12:00 AM    LIPID PANEL         Reviewed

 

                    2010 12:00 AM    THER/PROPH/DIAG INJ SC/IM    Reviewed

 

                    2010 12:00 AM    B12 Injection Ndc#[address]-96 (Angel)    Reviewed

 

                    2010 12:00 AM    THER/PROPH/DIAG INJ SC/IM    Reviewed

 

                    2010 12:00 AM    Kenalog 40 Mg Im-Ndc#[address]-24 (Stanley)    Reviewed

 

                    10/15/2010 12:00 AM    FLU VACCINE 3 YRS & > IM    Reviewed

 

                    10/15/2010 12:00 AM    Admin.Of M/C Cov.Vaccine-Flu Vacc.    Reviewed

 

                    1/15/2011 12:00 AM    COMPLETE CBC W/AUTO DIFF WBC    Reviewed

 

                    1/15/2011 12:00 AM    COMPREHEN METABOLIC PANEL    Reviewed

 

                    1/15/2011 12:00 AM    LIPID PANEL         Reviewed

 

                    2014 12:00 AM    MAMMOGRAM SCREENING    Reviewed

 

                    2014 12:00 AM    Screening mammography, bilateral    Reviewed

 

                    7/10/2014 12:00 AM    THER/PROPH/DIAG INJ SC/IM    Reviewed

 

                    7/10/2014 12:00 AM    B12 Injection, Up to 1000 Mcg NDC#3996-2990-83    Reviewed

 

                    2011 12:00 AM    COMPLETE CBC W/AUTO DIFF WBC    Reviewed

 

                    2011 12:00 AM    COMPREHEN METABOLIC PANEL    Reviewed

 

                    2011 12:00 AM    LIPID PANEL         Reviewed

 

                    2014 12:00 AM    B12 Injection, Up to 1000 Mcg NDC#4998-7781-86    Reviewed

 

                    10/19/2014 12:00 AM    MAMMOGRAM SCREENING    Reviewed

 

                    10/19/2014 12:00 AM    Screening mammography, bilateral    Reviewed

 

                    10/16/2014 12:00 AM    COMPLETE CBC W/AUTO DIFF WBC    Reviewed

 

                    10/16/2014 12:00 AM    COMPREHEN METABOLIC PANEL    Reviewed

 

                    10/16/2014 12:00 AM    IMMUNOASSAY TUMOR     Reviewed

 

                    10/16/2014 12:00 AM    LIPID PANEL         Reviewed

 

                    10/16/2014 12:00 AM    ASSAY OF LITHIUM    Reviewed

 

                    10/16/2014 12:00 AM    MAMMOGRAM SCREENING    Reviewed

 

                    2011 12:00 AM    ASSAY OF PARATHORMONE    Reviewed

 

                    2011 12:00 AM    VITAMIN D 25 HYDROXY    Reviewed

 

                    2011 12:00 AM    ASSAY OF LITHIUM    Reviewed

 

                    2011 12:00 AM    METABOLIC PANEL TOTAL CA    Reviewed

 

                    2011 12:00 AM    CT HEAD/BRAIN W/O & W/DYE    Reviewed

 

                    3/23/2015 12:00 AM    PNEUMOCOCCAL VACC 13 GLENDY IM    Reviewed

 

                    3/23/2015 12:00 AM    Vitamin B12 injection    Reviewed

 

                    2011 12:00 AM    ASSAY OF LITHIUM    Reviewed

 

                    2011 12:00 AM    B12 Injection Ndc#[address]-76  [place]    Reviewed

 

                    2015 12:00 AM    THER/PROPH/DIAG INJ SC/IM    Reviewed

 

                    2015 12:00 AM    B12 Injection, Up to 1000 Mcg NDC#2280-3679-42    Reviewed

 

                    2015 12:00 AM    COMPLETE CBC W/AUTO DIFF WBC    Reviewed

 

                    2015 12:00 AM    COMPREHEN METABOLIC PANEL    Reviewed

 

                    2015 12:00 AM    LIPID PANEL         Reviewed

 

                    2015 12:00 AM    ASSAY OF LITHIUM    Reviewed

 

                    2011 12:00 AM    VIT D 1 25-DIHYDROXY    Reviewed

 

                    2011 12:00 AM    VITAMIN B-12        Reviewed

 

                    2015 12:00 AM    B12 Injection, Up to 1000 Mcg NDC#1112-5084-93    Reviewed

 

                    2015 12:00 AM    THER/PROPH/DIAG INJ SC/IM    Reviewed

 

                    2015 12:00 AM    B12 Injection, Up to 1000 Mcg NDC#8623-9864-15    Reviewed

 

                    2011 12:00 AM    THER/PROPH/DIAG INJ SC/IM    Reviewed

 

                    2011 12:00 AM    B12 Injection ([facility]) Ndc#8663-6501-63    Reviewed

 

                    2015 12:00 AM    THER/PROPH/DIAG INJ SC/IM    Reviewed

 

                    2015 12:00 AM    B12 Injection, Up to 1000 Mcg NDC#8241-4756-25    Reviewed







Results Summary







                          Data and Description      Results

 

                          2004 12:00 AM        Colonoscopy-Women and Men over 50 Normal 

 

                          2008 12:00 AM         Pap Smear Declined 

 

                          10/7/2009 12:00 AM        Cholest Cry Stone Ql .0 %LDLc SerPl-mCnc 123.0 mg/dLHDLc

 SerPl-mCnc 34.0 mg/dLTrigl SerPl-mCnc 190.0 mg/dLGlucose SerPl-mCnc 78.0 mg/dL

 

                          2009 12:00 AM        Mammogram -Women over 40 Normal HIV1+2 Ab Ser Ql no risk 

 

                          2010 8:47 AM         Dexa Bone Scan Refused Aspirin reccommended Contraindication 



 

                          2010 8:48 AM         Depression Done 

 

                          2010 12:00 AM         Foot Exam-Diabetic Done 

 

                          2010 12:00 AM         Cholest Cry Stone Ql .0 %LDLc SerPl-mCnc 126.0 mg/dLGlucose

 SerPl-mCnc 102.0 mg/dL

 

                          2010 8:45 AM          TRIGLYCERIDES 122.0 mg/dLCHOLESTEROL 186.0 mg/dLHDL 36.0 mg/dLTOT

 CHOL/HDL 5.2 LDL (CALC) 126.0 mg/dLGLUCOSE 102.0 mg/dLSODIUM 143.0 
mmol/LPOTASSIUM 3.70 mmol/LCHLORIDE 111.0 mmol/LCO2 23.0 mmol/LBUN 10.0 
mg/dLCREATININE 0.80 mg/dLSGOT/AST 12.0 IU/LSGPT/ALT 11.0 IU/LALK PHOS 65.0 
IU/LTOTAL PROTEIN 7.20 g/dLALBUMIN 3.90 g/dLTOTAL BILI 0.50 mg/dLCALCIUM 10.20 
mg/dLAGE 59 GFR NonAA 73 GFR AA 88 eGFR >60 mL/min/1.73 m2eGFR AA* >60 WBC 5.7 
RBC 3.26 HGB 10.60 g/dLHCT 31.70 %MCV 97.0 fLMCH 32.50 pgMCHC 33.40 g/dLRDW SD 
47 RDW CV 13.30 %MPV 9.70 fLPLT 287 NRBC# 0.00 NRBC% 0.0 %NEUT 62.90 %%LYMP 
21.80 %%MONO 9.90 %%EOS 5.0 %%BASO 0.40 %#NEUT 3.56 #LYMP 1.23 #MONO 0.56 #EOS 
0.28 #BASO 0.02 MANUAL DIFF NOT IND 

 

                          2010 12:00 AM        Glucose SerPl-mCnc 96.0 mg/dLCholest Cry Stone Ql .0 %LDLc

 SerPl-mCnc 146.0 mg/dL

 

                          2010 8:26 AM         TRIGLYCERIDES 106.0 mg/dLCHOLESTEROL 199.0 mg/dLHDL 32.0 mg/dLTOT

 CHOL/HDL 6.2 LDL (CALC) 146.0 mg/dLGLUCOSE 96.0 mg/dLSODIUM 143.0 
mmol/LPOTASSIUM 4.0 mmol/LCHLORIDE 113.0 mmol/LCO2 24.0 mmol/LBUN 13.0 
mg/dLCREATININE 1.0 mg/dLSGOT/AST 11.0 IU/LSGPT/ALT 6.0 IU/LALK PHOS 56.0 
IU/LTOTAL PROTEIN 6.60 g/dLALBUMIN 3.80 g/dLTOTAL BILI 0.50 mg/dLCALCIUM 9.30 
mg/dLAGE 59 GFR NonAA 57 GFR AA 69 eGFR 57 eGFR AA* >60 

 

                          10/6/2010 12:00 AM        Cholest Cry Stone Ql .0 %LDLc SerPl-mCnc 111.0 mg/dLGlucose

 SerPl-mCnc 81.0 mg/dL

 

                          10/6/2010 2:45 PM         TRIGLYCERIDES 123.0 mg/dLCHOLESTEROL 178.0 mg/dLHDL 42.0 mg/dLTOT

 CHOL/HDL 4.2 LDL (CALC) 111.0 mg/dLGLUCOSE 81.0 mg/dLSODIUM 139.0 
mmol/LPOTASSIUM 4.10 mmol/LCHLORIDE 106.0 mmol/LCO2 24.0 mmol/LBUN 13.0 
mg/dLCREATININE 0.90 mg/dLSGOT/AST 13.0 IU/LSGPT/ALT 11.0 IU/LALK PHOS 61.0 
IU/LTOTAL PROTEIN 7.10 g/dLALBUMIN 3.90 g/dLTOTAL BILI 0.30 mg/dLCALCIUM 9.30 
mg/dLAGE 60 GFR NonAA 64 GFR AA 78 eGFR >60 mL/min/1.73 m2eGFR AA* >60 WBC 6.9 
RBC 3.59 HGB 11.50 g/dLHCT 35.30 %MCV 98.0 fLMCH 32.0 pgMCHC 32.60 g/dLRDW SD 46
 RDW CV 12.90 %MPV 9.90 fLPLT 311 NRBC# 0.00 NRBC% 0.0 %NEUT 64.90 %%LYMP 22.50 
%%MONO 7.20 %%EOS 5.10 %%BASO 0.30 %#NEUT 4.45 #LYMP 1.54 #MONO 0.49 #EOS 0.35 
#BASO 0.02 MANUAL DIFF NOT IND 

 

                          2011 12:00 AM         Mammogram -Women over 40 Ordered 

 

                          2011 10:25 AM        TRIGLYCERIDES 111.0 mg/dLCHOLESTEROL 195.0 mg/dLHDL 43.0 mg/dLTOT

 CHOL/HDL 4.5 LDL (CALC) 130.0 mg/dLWBC 5.3 RBC 3.76 HGB 12.0 g/dLHCT 37.80 %MCV
 101.0 fLMCH 31.90 pgMCHC 31.70 g/dLRDW SD 47 RDW CV 13.0 %MPV 9.70 fLPLT 259 
NRBC# 0.00 NRBC% 0.0 %NEUT 69.0 %%LYMP 17.60 %%MONO 8.30 %%EOS 4.70 %%BASO 0.40 
%#NEUT 3.63 #LYMP 0.93 #MONO 0.44 #EOS 0.25 #BASO 0.02 MANUAL DIFF NOT IND 
GLUCOSE 102.0 mg/dLSODIUM 146.0 mmol/LPOTASSIUM 4.20 mmol/LCHLORIDE 113.0 
mmol/LCO2 23.0 mmol/LBUN 15.0 mg/dLCREATININE 1.0 mg/dLSGOT/AST 12.0 
IU/LSGPT/ALT 17.0 IU/LALK PHOS 60.0 IU/LTOTAL PROTEIN 6.90 g/dLALBUMIN 4.20 
g/dLTOTAL BILI 0.40 mg/dLCALCIUM 9.70 mg/dLAGE 60 GFR NonAA 57 GFR AA 69 eGFR 57
 eGFR AA* >60 

 

                          2011 11:49 AM        Cholest Cry Stone Ql .0 %LDLc SerPl-mCnc 130.0 mg/dLHDLc

 SerPl-mCnc 43.0 mg/dLTrigl SerPl-mCnc 111.0 mg/dLGlucose SerPl-mCnc 102.0 mg/dL

 

                          2011 11:52 AM        Pap Smear Declined 

 

                          2011 11:28 AM        Lithium 2.080 mmol/LGLUCOSE 102.0 mg/dLSODIUM 135.0 mmol/LPOTASSIUM

 3.90 mmol/LCHLORIDE 106.0 mmol/LCO2 21.0 mmol/LBUN 12.0 mg/dLCREATININE 1.30 
mg/dLCALCIUM 10.70 mg/dLAGE 60 GFR NonAA 42 GFR AA 51 eGFR 42 eGFR AA* 51 

 

                          2011 8:58 AM          Lithium 0.690 mmol/L

 

                          2011 2:38 PM         VITAMIN B12 3483.0 pg/mL

 

                          2013 3:35 PM          WBC 5.1 RBC 3.73 HGB 11.70 g/dLHCT 36.40 %MCV 98.0 fLMCH 31.40

 pgMCHC 32.10 g/dLRDW SD 47 RDW CV 13.10 %MPV 9.80 fLPLT 224 NRBC# 0.00 NRBC% 
0.0 %NEUT 66.80 %%LYMP 19.10 %%MONO 9.0 %%EOS 4.90 %%BASO 0.20 %#NEUT 3.42 #LYMP
 0.98 #MONO 0.46 #EOS 0.25 #BASO 0.01 MANUAL DIFF NOT IND GLUCOSE 88.0 
mg/dLSODIUM 141.0 mmol/LPOTASSIUM 4.10 mmol/LCHLORIDE 110.0 mmol/LCO2 22.0 
mmol/LBUN 22.0 mg/dLCREATININE 1.10 mg/dLCALCIUM 9.80 mg/dLAGE 62 GFR NonAA 50 
GFR AA 61 eGFR 50 eGFR AA* 60 Lithium 0.760 mmol/L

 

                          2013 11:02 AM        TRIGLYCERIDES 106.0 mg/dLCHOLESTEROL 181.0 mg/dLHDL 46.0 mg/dLTOT

 CHOL/HDL 3.9 LDL (CALC) 114.0 mg/dLVITAMIN D 41.10 ng/mL

 

                          10/17/2014 10:10 AM       WBC 5.0 RBC 3.66 HGB 11.60 g/dLHCT 36.80 %.0 fLMCH 31.70

 pgMCHC 31.50 g/dLRDW SD 50 RDW CV 13.50 %MPV 10.10 fLPLT 209 NRBC# 0.00 NRBC% 
0.0 %NEUT 69.20 %%LYMP 21.0 %%MONO 6.40 %%EOS 3.20 %%BASO 0.20 %#NEUT 3.46 #LYMP
 1.05 #MONO 0.32 #EOS 0.16 #BASO 0.01 MANUAL DIFF NOT IND GLUCOSE 100.0 
mg/dLSODIUM 148.0 mmol/LPOTASSIUM 3.90 mmol/LCHLORIDE 114.0 mmol/LCO2 26.0 
mmol/LBUN 12.0 mg/dLCREATININE 1.20 mg/dLSGOT/AST 9.0 IU/LSGPT/ALT <6 IU/LALK 
PHOS 82.0 IU/LTOTAL PROTEIN 6.90 g/dLALBUMIN 4.0 g/dLTOTAL BILI 0.40 
mg/dLCALCIUM 10.50 mg/dLAGE 64 GFR NonAA 45 GFR AA 55 eGFR 45 eGFR AA* 55 
TRIGLYCERIDES 96.0 mg/dLCHOLESTEROL 155.0 mg/dLHDL 38.0 mg/dLTOT CHOL/HDL 4.1 
LDL (CALC) 98.0 mg/dLLithium 0.850 mmol/LCancer Antigen (CA) 125 8.30 U/mL

 

                          2015 10:25 AM        Lithium 0.790 mmol/LWBC 4.8 RBC 3.44 HGB 11.0 g/dLHCT 35.20 

%.0 fLMCH 32.0 pgMCHC 31.30 g/dLRDW SD 53 RDW CV 14.0 %MPV 9.30 fLPLT 210
 NRBC# 0.00 NRBC% 0.0 %NEUT 70.80 %%LYMP 17.20 %%MONO 8.10 %%EOS 3.50 %%BASO 
0.40 %#NEUT 3.41 #LYMP 0.83 #MONO 0.39 #EOS 0.17 #BASO 0.02 MANUAL DIFF NOT IND 
TRIGLYCERIDES 107.0 mg/dLCHOLESTEROL 174.0 mg/dLHDL 43.0 mg/dLTOT CHOL/HDL 4.0 
LDL (CALC) 110.0 mg/dLGLUCOSE 90.0 mg/dLSODIUM 145.0 mmol/LPOTASSIUM 3.80 
mmol/LCHLORIDE 115.0 mmol/LCO2 24.0 mmol/LBUN 17.0 mg/dLCREATININE 1.30 
mg/dLSGOT/AST 18.0 IU/LSGPT/ALT 17.0 IU/LALK PHOS 56.0 IU/LTOTAL PROTEIN 6.70 
g/dLALBUMIN 3.90 g/dLTOTAL BILI 0.40 mg/dLCALCIUM 9.80 mg/dLAGE 64 GFR NonAA 41 
GFR AA 50 eGFR 41 eGFR AA* 50 

 

                          2015 8:50 AM        WBC 5.8 RBC 3.29 HGB 10.70 g/dLHCT 34.0 %.0 fLMCH 32.50

 pgMCHC 31.50 g/dLRDW SD 52 RDW CV 13.60 %MPV 9.60 fLPLT 223 NRBC# 0.00 NRBC% 
0.0 %NEUT 69.60 %%LYMP 18.90 %%MONO 8.50 %%EOS 2.80 %%BASO 0.20 %#NEUT 4.03 
#LYMP 1.09 #MONO 0.49 #EOS 0.16 #BASO 0.01 MANUAL DIFF NOT IND Lithium 0.620 
mmol/LGLUCOSE 83.0 mg/dLSODIUM 139.0 mmol/LPOTASSIUM 3.90 mmol/LCHLORIDE 109.0 
mmol/LCO2 22.0 mmol/LBUN 19.0 mg/dLCREATININE 1.40 mg/dLSGOT/AST 19.0 
IU/LSGPT/ALT 21.0 IU/LALK PHOS 55.0 IU/LTOTAL PROTEIN 6.50 g/dLALBUMIN 3.90 
g/dLTOTAL BILI 0.50 mg/dLCALCIUM 9.60 mg/dLAGE 65 GFR NonAA 38 GFR AA 46 eGFR 38
 eGFR AA* 46 TRIGLYCERIDES 121.0 mg/dLCHOLESTEROL 192.0 mg/dLHDL 51.0 mg/dLTOT 
CHOL/HDL 3.8 .0 mg/dLFREE T4 0.79 TSH 1.210 uIU/mLHemoglobin A1c 5.40 
%Estim. Avg Glu (eAG) 108 

 

                          3/15/2016 8:08 AM         VITAMIN B12 696.0 pg/mL

 

                          3/23/2016 8:26 AM         WBC 7.0 RBC 3.61 HGB 11.80 g/dLHCT 37.70 %.0 fLMCH 32.70

 pgMCHC 31.30 g/dLRDW SD 49 RDW CV 12.50 %MPV 10.0 fLPLT 207 NRBC# 0.00 NRBC% 
0.0 %NEUT 73.60 %%LYMP 16.40 %%MONO 6.60 %%EOS 3.0 %%BASO 0.30 %#NEUT 5.15 #LYMP
 1.15 #MONO 0.46 #EOS 0.21 #BASO 0.02 MANUAL DIFF NOT IND Lithium 0.940 
mmol/LGLUCOSE 108.0 mg/dLSODIUM 143.0 mmol/LPOTASSIUM 4.30 mmol/LCHLORIDE 110.0 
mmol/LCO2 27.0 mmol/LBUN 16.0 mg/dLCREATININE 1.60 mg/dLSGOT/AST 13.0 
IU/LSGPT/ALT 7.0 IU/LALK PHOS 71.0 IU/LTOTAL PROTEIN 6.80 g/dLALBUMIN 4.0 
g/dLTOTAL BILI 0.20 mg/dLCALCIUM 10.40 mg/dLAGE 65 GFR NonAA 32 GFR AA 39 eGFR 
32 eGFR AA* 39 TRIGLYCERIDES 113.0 mg/dLCHOLESTEROL 169.0 mg/dLHDL 42.0 mg/dLTOT
 CHOL/HDL 4.0 LDL (CALC) 104.0 mg/dLFREE T4 0.86 TSH 2.20 uIU/mLHemoglobin A1c 
5.20 %Estim. Avg Glu (eAG) 103 

 

                          3/25/2016 9:17 AM         COLOR YELLOW APPEARANCE CLEAR SPEC GRAV 1.010 pH 7.0 PROTEIN 

NEGATIVE GLUCOSE NEGATIVE mg/dLKETONE NEGATIVE BILIRUBIN NEGATIVE BLOOD NEGATIVE
 NITRITE NEGATIVE LEUK SCREEN SMALL MICRO IND? SEE BELOW WBC/HPF 0-5 RBC/HPF 
NEGATIVE CASTS/LPF NEGATIVE /LPFCRYSTALS NEGATIVE MUCOUS THRDS NEGATIVE BACTERIA
 NEGATIVE EPITH CELLS FEW SQUAMOUS /HPFTRICHOMONAS NEGATIVE YEAST NEGATIVE 







History Of Immunizations







       Name    Date Admin    Mfg Name    Mfg Code    Trade Name    Lot#    Route    Inj    Vis Given    Vis

 Pub                                    CVX

 

        Influenza    2008    Not Entered    NE      Not Entered            Not Entered    Not Entered

                    1            999

 

        X       12/19/2008    [employer]    MSD     Pneumovax 23            Intramuscular    Not Entered

                    1            999

 

           Influenza    10/15/2010    [employer]    NOV        Fluvirin > 12 Years    

[id number]     Intramuscular    Left Deltoid    10/15/2010    2009    999

 

          X         3/23/2015    [facility]    [facility]       Prevnar 13    [id number]    Intramuscular

                Right Gluteous Medius    3/23/2015       2013       109







History of Past Illness







                    Name                Date of Onset       Comments

 

                    Peritoneal Neoplasm, Malignant                         

 

                    Hyperlipidemia                           

 

                    Bipolar disorder, unspecified                         

 

                    Artificial opening status; colostomy                         

 

                    B12 deficiency                           

 

                    Anemia, Pernicious                         

 

                    Arthritis unspecified                         

 

                    cervical cancer                          

 

                    Artificial opening status; colostomy    2010  1:10PM     

 

                    Bipolar disorder, unspecified    2010  1:10PM     

 

                    Hyperlipidemia      2010  1:10PM     

 

                    Anemia, Pernicious    2010  1:10PM     

 

                    Postoperative Follow-Up    2010  1:55PM     

 

                    Postoperative Follow-Up    Mar  8 2010 10:57AM     

 

                    Artificial opening status; colostomy    Mar  8 2010  1:19PM     

 

                    Peritoneal Neoplasm, Malignant    Mar  8 2010  1:19PM     

 

                    Artificial opening status; colostomy    2010  1:40PM     

 

                    Hyperlipidemia      2010  1:40PM     

 

                    Anemia, Pernicious    2010  1:40PM     

 

                    Peritoneal Neoplasm, Malignant    2010  1:40PM     

 

                    Arthritis unspecified    2010  1:40PM     

 

                    Anemia of Chronic Illness    2010  1:40PM     

 

                    Tinea corporis      2010  3:17PM     

 

                    Bipolar disorder, unspecified    2010  1:33PM     

 

                    Hyperlipidemia      2010  1:33PM     

 

                    Anemia, Pernicious    2010  1:33PM     

 

                    Peritoneal Neoplasm, Malignant    2010  1:33PM     

 

                    B12 deficiency      2010  1:33PM     

 

                    Ethmoidal Sinusitis, Acute    Sep 21 2010  3:53PM     

 

                    Wheezing            Sep 21 2010  3:53PM     

 

                    Flu                 Oct 15 2010  1:40PM     

 

                    Bipolar disorder, unspecified    Oct 15 2010  1:42PM     

 

                    Hyperlipidemia      Oct 15 2010  1:42PM     

 

                    Anemia, Pernicious    Oct 15 2010  1:42PM     

 

                    Peritoneal Neoplasm, Malignant    Oct 15 2010  1:42PM     

 

                    Bipolar disorder, unspecified    2011 12:01PM     

 

                    Hyperlipidemia      2011 12:01PM     

 

                    Anemia, Pernicious    2011 12:01PM     

 

                    Peritoneal Neoplasm, Malignant    2011 12:01PM     

 

                    Bipolar disorder, unspecified    Apr 15 2011 10:55AM     

 

                    Major Depression    2011 10:11AM     

 

                    Bipolar Disorder    2011 10:11AM     

 

                    Cancer              May 10 2011  4:16PM     

 

                    Major Depression    May 10 2011  3:16PM     

 

                    Bipolar Disorder    May 10 2011  3:16PM     

 

                    Hypercalcemia       May 23 2011  2:47PM     

 

                    Bipolar disorder, unspecified    May 23 2011  2:47PM     

 

                    Colon Cancer, Personal History    May 23 2011  2:47PM     

 

                    Bipolar Disorder    May 31 2011  4:39PM     

 

                    Depressive Disorder    2011 10:01AM     

 

                    Vitamin B12 deficiency    2011 10:01AM     

 

                    Vitamin D Deficiency    2011  5:07PM     

 

                    Anemia, Vitamin B12 Deficiency    2011  5:07PM     

 

                    B12 deficiency      2011  3:56PM     

 

                    Routine gynecological examination    Aug  4 2011  9:08AM     

 

                    Screening Examination for Breast Cancer    Aug  4 2011  9:08AM     

 

                    Tinea Corporis      Aug  4 2011  9:08AM     

 

                    Depressive Disorder    Sep 23 2011  8:47AM     

 

                    Contact Dermatitis    Sep 23 2011  8:47AM     

 

                    Anemia, Pernicious    Sep 23 2011  8:47AM     

 

                    B12 deficiency      Sep 23 2011  8:47AM     

 

                    B12 deficiency      Sep 27 2011  2:58PM     

 

                    B12 deficiency      Oct 20 2011  2:34PM     

 

                    Flu                 Dec  9 2011  3:16PM     

 

                    B12 deficiency      Dec  9 2011  3:17PM     

 

                    B12 deficiency      2012  4:52PM     

 

                    B12 deficiency      2012 11:10AM     

 

                    B12 deficiency      2012  3:37PM     

 

                    B12 deficiency      May  3 2012  4:10PM     

 

                    B12 deficiency      2012  2:54PM     

 

                    B12 deficiency      2012 11:23AM     

 

                    B12 deficiency      Aug  9 2012  2:08PM     

 

                    B12 deficiency      Sep  6 2012  4:36PM     

 

                    B12 deficiency      Oct 16 2012 10:23AM     

 

                    Flu                 Feb  2013  3:11PM     

 

                    Bipolar disorder, unspecified    Feb  2013  2:48PM     

 

                    Anemia, Pernicious    Feb  2013  2:48PM     

 

                    B12 deficiency      2013  2:48PM     

 

                    Extrapyramidal abnormal movement disorder    2013  2:48PM     

 

                    B12 deficiency      Apr  3 2013 12:03PM     

 

                    Bipolar disorder, unspecified    May  7 2013  1:31PM     

 

                    Anemia, Pernicious    May  7 2013  1:31PM     

 

                    B12 deficiency      May  7 2013  1:31PM     

 

                    Extrapyramidal abnormal movement disorder    May  7 2013  1:31PM     

 

                    B12 deficiency      2013  3:42PM     

 

                    B12 deficiency      2013  1:31PM     

 

                    Hyperlipidemia      Aug  7 2013 10:37AM     

 

                    Vitamin D Deficiency    Aug  7 2013 10:37AM     

 

                    Bipolar disorder, unspecified    Aug  7 2013 10:37AM     

 

                    Anemia, Pernicious    Aug  7 2013 10:37AM     

 

                    B12 deficiency      Aug  7 2013 10:37AM     

 

                    B12 deficiency      Sep 25 2013 11:15AM     

 

                    B12 deficiency      Dec 11 2013  3:16PM     

 

                    B12 deficiency      Mar  6 2014  1:48PM     

 

                    B12 deficiency      May 21 2014  3:17PM     

 

                    Screening Examination for Breast Cancer    2014  3:23PM     

 

                    Periumbilical abdominal pain    2014  3:23PM     

 

                    B12 deficiency      Jul 10 2014  2:52PM     

 

                    Anemia, Vitamin B12 Deficiency    Aug 13 2014  4:50PM     

 

                    Bipolar disorder    Oct 16 2014 11:13AM     

 

                    Hyperlipidemia      Oct 16 2014 11:13AM     

 

                    Anemia, Pernicious    Oct 16 2014 11:13AM     

 

                    Peritoneal Neoplasm, Malignant    Oct 16 2014 11:13AM     

 

                    Screening breast examination    Oct 16 2014 11:13AM     

 

                    Weight loss         Oct 16 2014 11:13AM     

 

                    Anemia, Pernicious    Mar 23 2015  2:57PM     

 

                    B12 deficiency      Mar 23 2015  2:57PM     

 

                    Need for Prevnar vaccine    Mar 23 2015  2:57PM     

 

                    Bipolar disorder    Mar 23 2015  2:57PM     

 

                    Hyperlipidemia      Mar 23 2015  2:57PM     

 

                    Anemia, Pernicious    Mar 23 2015  2:57PM     

 

                    Peritoneal Neoplasm, Malignant    Mar 23 2015  2:57PM     

 

                    B12 deficiency      May  4 2015  4:48PM     

 

                    Hyperlipidemia      May 13 2015  9:56AM     

 

                    Anemia              May 13 2015  9:56AM     

 

                    Bipolar disorder    May 13 2015  9:56AM     

 

                    Bipolar disorder    May 14 2015  3:27PM     

 

                    Hyperlipidemia      May 14 2015  3:27PM     

 

                    Anemia, Pernicious    May 14 2015  3:27PM     

 

                    Peritoneal Neoplasm, Malignant    May 14 2015  3:27PM     

 

                    B12 deficiency      2015  2:20PM     

 

                    B12 deficiency      2015 11:34AM     

 

                    B12 deficiency      Aug 18 2015  9:06AM     

 

                    Tinea Corporis      Sep 18 2015  8:54AM     

 

                    B12 deficiency      Sep 18 2015  8:54AM     

 

                    B12 deficiency      2015 10:28AM     

 

                    Herpes zoster without complication    Dec  3 2015  9:52AM     

 

                    B12 deficiency      Dec 23 2015 11:21AM     

 

                    B12 deficiency      2016  4:51PM     

 

                    Vitamin B 12 deficiency    Mar 14 2016  5:35PM     

 

                    B12 deficiency      Mar 15 2016 12:14PM     

 

                    B12 deficiency      May  5 2016 11:30AM     

 

                    Edema               May  5 2016 11:30AM     

 

                    Dermatitis          May  5 2016 11:30AM     

 

                    Edema               May 17 2016  8:38AM     

 

                    Shortness of breath    May 17 2016  8:38AM     

 

                    Bilateral edema of lower extremity    2016  2:06PM     

 

                    B12 deficiency      2016  2:06PM     

 

                    B12 deficiency      2016 11:50AM     

 

                    B12 deficiency      2016 11:20AM     

 

                    Diarrhea            Aug  2 2016  3:13PM     

 

                    B12 deficiency      Aug 24 2016 11:10AM     

 

                    Encounter for screening mammogram for breast cancer    Aug 24 2016 11:44AM     

 

                    B12 deficiency      Sep 28 2016  2:35PM     







Payers







           Insurance Name    Company Name    Plan Name    Plan Number    Policy Number    Policy Group

 Number                                 Start Date

 

                    Medicare Part A    Medicare RHC              [id number]              N/A

 

                          [employer]    [employer]                 [id number]

                                                    

 

                    Medicare Part A    Medicare - Lab/Xray              [id number]              2006

 

                    Medicare Part B    [employer]ic[employer] [place]              [id number]              2006

 

                          [employer]    [employer]                 50 percent

                                                    2009

 

                    [facility]    [employer]              [id number]              N/A

 

                    Medicare Part A    Medicare Part A              [id number]              N/A

 

                    Medicare Part A    Medicare Part A              [id number]              2006









History of Encounters







                    Visit Date          Visit Type          Provider

 

                    2016           Nurse visit         Bret Forte APRFIORELLA

 

                    2016           Nurse visit         Bhupinder Louise DO

 

                    2016            Office visit        Bhupinder Louise DO

 

                    2016           Nurse visit         Bhupinder Louise DO

 

                    2016           Office visit        Bret Forte APRN

 

                    2016            Office visit        Bhupinder Louise DO

 

                    3/15/2016           Nurse visit         Bhupinder Louise DO

 

                    2016            Nurse visit         Bhupinder Louise DO

 

                    2015          Nurse visit         Bhupinder Louise DO

 

                    12/3/2015           Office visit        Bhupinder Louise DO

 

                    2015          Nurse visit         Bhupinder Louise DO

 

                    2015           Office visit        Bhupinder Aspen DO

 

                    2015           Nurse visit         Bhupinder Aspen DO

 

                    2015            Nurse visit         Bhupinder Aspen DO

 

                    2015            Nurse visit         Bhupinder Aspen DO

 

                    2015           Office visit        Bhupinder Aspen DO

 

                    2015            Nurse visit         Bhupinder Aspen DO

 

                    3/23/2015           Office visit        Bhupinder Aspen DO

 

                    10/16/2014          Office visit        Bhupinder Aspen DO

 

                    2014           Nurse visit         Radha Ontiveros APRN

 

                    7/10/2014           Nurse visit         Bhupinder Aspen DO

 

                    2014           Office visit        Bhupinder Aspen DO

 

                    2014           Nurse visit         Bhupinder Aspen DO

 

                    3/6/2014            Nurse visit         Bhupinder Aspen DO

 

                    2014            [facility]            EARNEST Lopez MD

 

                    2013          Nurse visit         Bhupinder Aspen DO

 

                    2013           Nurse visit         Bhupinder Aspen DO

 

                    2013            Office visit        Bhupinder Sapen DO

 

                    2013            Nurse visit         Bhupinder Aspen DO

 

                    2013            Nurse visit         Bhupinder Aspen DO

 

                    2013            Office visit        Bhupinder Aspen DO

 

                    4/3/2013            Nurse visit         Bhupinder Aspen DO

 

                    2013            Office visit        Bhupinder Aspen DO

 

                    10/16/2012          Nurse visit         Bhupinder Aspen DO

 

                    2012            Nurse visit         Bhupinder Aspen DO

 

                    2012            Voided              Bhupinder Aspen DO

 

                    2012            Nurse visit         Bhupinder Aspen DO

 

                    2012            Nurse visit         Bhupinder Aspen DO

 

                    2012           Nurse visit         Bhupinder Aspen DO

 

                    5/3/2012            Nurse visit         Bhupinder Aspen DO

 

                    2012           Nurse visit         Bhupinder Aspen DO

 

                    2012           Nurse visit         Bhupinder Aspen DO

 

                    2012           Nurse visit         Bhupinder Aspen DO

 

                    2011           Nurse visit         Bhupinder Aspen DO

 

                    10/20/2011          Nurse visit         Bhupinder Aspen DO

 

                    2011           Office visit        Bhupinder Aspen DO

 

                    2011           Nurse visit         Radha Ontiveros APRN

 

                    2011            Office visit        Bhupinder Aspen DO

 

                    2011           Nurse visit         Bhupinder Aspen DO

 

                    2011            Office visit        Bhupinder Louise DO

 

                    2011           Office visit        Bhupinder Louise DO

 

                    5/10/2011           Office visit        Bhupinder Louise DO

 

                    2011           Office visit        Bhupinder Louise DO

 

                    4/15/2011           Office visit        Devin Angel DO

 

                    2011           Office visit        Devin Angel DO

 

                    10/15/2010          Office visit        Devin Angel DO

 

                    2010           Office visit        Devin Angel DO

 

                    2010            Office visit        Devin Angel DO

 

                    2010           Office visit        Devin Angel DO

 

                    2010            Office visit        Devin Angel DO

 

                    3/8/2010            Office visit        Devin Masterson MD

 

                    2010            Surgery             Devin Masterson MD

 

                    2010            Office visit        Devin Angel DO

 

                    2010           Surgery             Devin Masterson MD

 

                    2010           Hospital            Devin Masterson MD

 

                    2010           [facility]            Devin Masterson MD

 

                    10/22/2009          Office visit        Devin Angel DO

## 2019-06-26 NOTE — XMS REPORT
MU2 Ambulatory Summary

                             Created on: 2017



Pauline Gan

External Reference #: [id number]

: 1950

Sex: Female



Demographics







                          Address                   [address]

[place]  [id number]

 

                          Home Phone                [phone]

 

                          Preferred Language        English

 

                          Marital Status            Legally 

 

                          Tenriism Affiliation     Unknown

 

                          Race                      White

 

                          Ethnic Group              Not  or 





Author







                          Author                    Bhupinder Louise

 

                          [facility]

 

                          Address                   [address]

[place]  [id number]



 

                          Phone                     [phone]







Care Team Providers







                    Care Team Member Name    Role                Phone

 

                    Bhupinder Louise    PCP                 Unavailable

 

                    Bhupinder Louise    PreferredProvider    Unavailable







Allergies and Adverse Reactions







                    Name                Reaction            Notes

 

                    NO KNOWN DRUG ALLERGIES                         







Plan of Treatment







             Planned Activity    Comments     Planned Date    Planned Time    Plan/Goal

 

             Injection,Subcutaneous/Intramuscul, [facility]                 2017    12:00 AM      







Medications







                                        Active 

 

             Name         Start Date    Estimated Completion Date    SIG          Comments

 

                Latuda 20 mg oral tablet                                    take 1 tablet (20 mg) by oral route once daily with

 food (at least 350 calories)            

 

             pravastatin 40 mg oral tablet    3/30/2015                 TAKE 1 TABLET BY MOUTH DAILY     

 

                Namenda XR 28 mg oral capsule,sprinkle,ER 24hr    2015                       take 1 capsule (28

 mg) by oral route once daily            

 

                Namenda XR 28 mg oral capsule,sprinkle,ER 24hr    2016                       take 1 capsule (28

 mg) by oral route once daily            

 

                potassium chloride 10 mEq oral tablet extended release    2016                       take 1 tablet

 (10 meq) by oral route once daily       

 

             pravastatin 40 mg oral tablet    2016                 TAKE 1 TABLET BY MOUTH DAILY     

 

                Vitamin B-12 1,000 mcg/mL injection solution    2016                       inject 1 milliliter 

(1,000 mcg) by intramuscular route once a month     

 

                potassium chloride 10 mEq oral tablet extended release    2016                      take 1 tablet

 (10 meq) by oral route once daily       

 

                Namenda XR 28 mg oral capsule,sprinkle,ER 24hr    2016                      TAKE 1 CAPSULE BY

 MOUTH EVERY DAY                         

 

                furosemide 40 mg oral tablet    2016                      take 1 tablet (40 mg) by oral route

 once daily                              

 

                mirtazapine 45 mg oral tablet                                    take 1 tablet (45 mg) by oral route once daily

 before bedtime                          

 

             Fish Oil 300-1,000 mg oral capsule                              take 1 capsule by oral route daily     

 

             Vitamin D3 1,000 unit oral tablet                              take 1 tablet by oral route daily     

 

                Calcium 600 600 mg calcium (1,500 mg) oral tablet                                    take 1 tablet by oral route

 daily                                   

 

                Aspirin Low Dose 81 mg oral tablet,delayed release (DR/EC)                                    take 1 tablet 

(81 mg) by oral route once daily         

 

                metoclopramide HCl 10 mg oral tablet    2017                       take 1 tablet by oral route 

2 times a day for 50 days                

 

             furosemide 40 mg oral tablet    2017                 TAKE 1 TABLET BY MOUTH DAILY     

 

                Namenda XR 28 mg oral capsule,sprinkle,ER 24hr    2017                       TAKE 1 CAPSULE BY 

MOUTH EVERY DAY                          

 

                Linzess 72 mcg oral capsule    2017                       take 1 capsule (72 mcg) by oral route

 once daily on an empty stomach at least 30 minutes before 1st meal of the day     



 

             pravastatin 40 mg oral tablet    2017                 TAKE 1 TABLET BY MOUTH DAILY     

 

                potassium chloride 10 mEq oral tablet extended release    2017                       TAKE 2 TABLETs

 BY MOUTH twice DAILY for one week       

 

                metoclopramide HCl 10 mg oral tablet    2017                       TAKE 1 TABLET BY ORAL ROUTE 

2 TIMES A DAY FOR 50 DAYS                









                                         

 

             Name         Start Date    Expiration Date    SIG          Comments

 

             Reglan 10mg    3/29/2010    2010    one ac and hs     

 

                Keflex 500 mg oral capsule    2010       10/1/2010       take 1 capsule (500 mg) by oral

 route every 6 hours for 10 days         

 

                Bactrim -160 mg oral tablet    2011       take 1 tablet by oral route

 every 12 hours for 7 days               

 

                triamcinolone acetonide 0.1 % topical cream    2011      apply a thin

 layer to the affected area(s) by topical route 2 times per day     

 

                sertraline 100 mg oral tablet    4/10/2012       5/10/2012       take 1.5 tablets by oral route

 daily for 30 days                       

 

                ergocalciferol (vitamin D2) 50,000 unit oral capsule    4/15/2013       2013       TAKE

 ONE CAPSULE BY MOUTH ONCE A WEEK        

 

                CYANOCOBALAM 1000MCGINJ 1000 milliliter    2013       INJECT 1ML INTRAMUSCULAR

 ONCE A MONTH                            

 

                pravastatin 40 mg oral tablet    3/25/2014       3/20/2015       TAKE ONE TABLET BY MOUTH EVERY

 DAY                                     

 

                          Zostavax (PF) 19,400 unit/0.65 mL subcutaneous suspension for reconstitution    3/23/2015

                    3/24/2015           inject 0.65 milliliter by subcutaneous route once     

 

                famciclovir 500 mg oral tablet    12/3/2015       12/10/2015      take 1 tablet (500 mg) by

 oral route every 8 hours for 7 days     

 

                furosemide 40 mg oral tablet    2016      take 1 tablet (40 mg) by oral

 route once daily                        

 

                Cipro 500 mg oral tablet    2016       take 1 tablet (500 mg) by oral route

 2 times per day for 5 days              

 

                Bactrim -160 mg oral tablet    2016        take 1 tablet by oral route

 every 12 hours for 7 days               

 

                metoclopramide HCl 10 mg oral tablet    2017       take 1 tablet by oral

 route 2 times a day for 50 days         

 

                Macrobid 100 mg oral capsule    2017       take 1 capsule (100 mg) by oral

 route 2 times per day with food for 7 days     

 

                Augmentin 875-125 mg oral tablet    2017       take 1 tablet by oral route

 every 12 hours for 7 days               

 

                amoxicillin 500 mg oral tablet    2017       take 1 tablet (500 mg) by oral

 route every 12 hours for 7 days         

 

                ciprofloxacin HCl 500 mg oral tablet    2017       take 1 tablet (500 mg)

 by oral route every 12 hours for 5 days     

 

                cefuroxime axetil 500 mg oral tablet    2017        take 1 tablet (500 mg)

 by oral route 2 times per day for 10 days     









                                        Discontinued 

 

             Name         Start Date    Discontinued Date    SIG          Comments

 

                Tylenol 325 mg oral tablet                    2013        take 1 - 2 tablets (325 -650 mg) by oral

 route every 4-6 hours as needed         

 

                Calcium 600 + D(3) 600 mg(1,500mg) -400 unit oral tablet                    2011       take 1 tablet

 by oral route 2 times a day            no longer taking

 

                Vitamin B-12 1,000 mcg oral tablet extended release    2010       take 1

 tablet by oral route daily             no longer taking

 

                Antifungal (clotrimazole) 1 % topical cream    2010       apply to the 

affected and surrounding areas of skin by topical route 2 times per day morning 
and evening                              

 

                sertraline 100 mg oral tablet    5/10/2011       2011       take 2 tablets (200 mg) by 

oral route once daily                   discontinued by Dr. Serrano

 

                mirtazapine 15 mg oral tablet                    2011        take 1 tablet (15 mg) by oral route 

once daily before bedtime               Dr. Serrano

 

                mirtazapine 15 mg oral tablet                    2011        take 1 tablet (15 mg) by oral route 

once daily before bedtime               dc'd by Dr. Serrano

 

                Pristiq 50 mg oral tablet extended release 24 hr                    2013        take 1 tablet (50

 mg) by oral route once daily           Dr. Serrano

 

                Pristiq 50 mg oral tablet extended release 24 hr                    2013        take 1 tablet (50

 mg) by oral route once daily           dose updated

 

                Vitamin B-12 1,000 mcg/mL injection solution    2011        inject 1 milliliter

 (1,000 mcg) by intramuscular route once a month    on list already

 

                    syringe with needle 1 mL 25 gauge x 1" miscellaneous syringe    2011

                          use for injection once a month     

 

                clotrimazole 1 % topical cream    2011        apply to the affected and surrounding

 areas of skin by topical route 2 times per day in the morning and evening     

 

                Vitamin D2 50,000 unit oral capsule    2011        take 1 capsule (50,000

 unit) by oral route once weekly        generic on list

 

                Pravachol 40 mg oral tablet    2012        take 1 tablet (40 mg) by oral 

route once daily for 90 days            generic on list

 

                lithium carbonate 300 mg oral capsule    2012        take 1 capsule by oral

 route daily                            dose updated

 

                Pristiq 100 mg oral tablet extended release 24 hr                    4/10/2012       take 1 and 1/2 

tablet (150 mg) by oral route once daily    Mental Health provider

 

                Pristiq 100 mg oral tablet extended release 24 hr                    4/10/2012       take 1 and 1/2 

tablet (150 mg) by oral route once daily    Discontinued by Dr Efrain Knight at [facility]

 

                hydroxyzine HCl 50 mg oral tablet    10/16/2014      2015       take 1 tablet (50 mg) 

by oral route at bedtime                 

 

                lithium carbonate 300 mg oral capsule    2015       take 1 capsule (300

 mg) by oral route 2 for 30 days         

 

                fluconazole 100 mg oral tablet    2015       12/3/2015       take 1 tablet (100 mg) by 

oral route once a week                   

 

                ketoconazole 2 % topical cream    2015       12/3/2015       apply to the affected area(s)

 by topical route 2 times per day        

 

                prednisone 10 mg oral tablet    12/3/2015       2016        take 2 tablets (20 mg) by oral

 route once daily for 4 days 1 tablet daily for 4 days 0.5 tablet daily for 4 
days                                     

 

                triamcinolone acetonide 0.1 % topical cream    2016       apply a thin layer

 to the affected area(s) by topical route 2 times per day     

 

                Cipro 500 mg oral tablet    1/15/2017       2017       take 1 tablet (500 mg) by oral route

 every 12 hours for 10 days              







Problem List







                    Description         Status              Onset

 

                    Artificial opening status; colostomy    Active               

 

                    Bipolar disorder, unspecified    Active               

 

                    Hyperlipidemia      Active               

 

                    Peritoneal Neoplasm, Malignant    Active               

 

                    Anemia, Pernicious    Active               

 

                    Arthritis unspecified    Active               

 

                    B12 deficiency      Active               







Vital Signs







      Date    Time    BP-Sys(mm[Hg]    BP-Lynn(mm[Hg])    HR(bpm)    RR(rpm)    Temp    WT    HT    HC    BMI

                    BSA                 BMI Percentile      O2 Sat(%)

 

        2017    1:34:00 PM    118 mmHg    62 mmHg    122 bpm    18 rpm    97.8 F    161.375 lbs    

69 in                     23.83 kg/m2    1.89 m2                   96 %

 

        2017    3:05:00 PM    134 mmHg    70 mmHg    70 bpm    20 rpm    97.4 F    181 lbs    69 in

                          26.7288 kg/m    1.9992 m                 98 %

 

        2017    11:07:00 AM    124 mmHg    64 mmHg    62 bpm    17 rpm    98.2 F    181.2 lbs    69

 in                       26.76 kg/m2    2.00 m2                   98 %

 

        1/15/2017    3:34:00 PM    148 mmHg    89 mmHg    69 bpm    20 rpm    98.2 F    179 lbs    69 in

                          26.4334 kg/m    1.9882 m                 98 %

 

       2017    1:51:00 PM    160 mmHg    90 mmHg    100 bpm    20 rpm    96.5 F    179 lbs             

                                                                98 %

 

       2016    3:11:00 PM    134 mmHg    76 mmHg    80 bpm    20 rpm    98 F    163 lbs    69 in     

                24.0706 kg/m    1.8972 m                      98 %

 

        2016    2:04:00 PM    142 mmHg    86 mmHg    68 bpm    16 rpm    98.5 F    166 lbs    63 in

                          29.41 kg/m2    1.83 m2                   100 %

 

        2016    11:27:00 AM    148 mmHg    78 mmHg    90 bpm    20 rpm    98.2 F    153 lbs    69 in

                          22.5939 kg/m    1.8381 m                 96 %

 

        12/3/2015    9:50:00 AM    132 mmHg    70 mmHg    62 bpm    16 rpm    97.9 F    145 lbs    69 in

                          21.41 kg/m2    1.79 m2                   100 %

 

        2015    8:52:00 AM    132 mmHg    68 mmHg    52 bpm    20 rpm    97.8 F    141 lbs    69 in

                          20.8218 kg/m    1.7645 m                 100 %

 

        2015    3:25:00 PM    120 mmHg    62 mmHg    72 bpm    16 rpm    98.1 F    136 lbs    69 in

                          20.08 kg/m2    1.73 m2                   98 %

 

       3/23/2015    2:55:00 PM    130 mmHg    76 mmHg    68 bpm    18 rpm    97 F    140 lbs    69 in    

                20.6742 kg/m    1.7583 m                      98 %

 

        10/16/2014    11:11:00 AM    120 mmHg    66 mmHg    77 bpm    20 rpm    98 F    130 lbs    69 in

                          19.20 kg/m2    1.69 m2                   100 %

 

        2014    3:21:00 PM    130 mmHg    66 mmHg    63 bpm    18 rpm    97.2 F    160 lbs    69 in

                          23.6276 kg/m    1.8797 m                 99 %

 

        2013    10:35:00 AM    132 mmHg    70 mmHg    66 bpm    20 rpm    98.1 F    157 lbs    69 in

                          23.18 kg/m2    1.86 m2                    

 

        2013    1:29:00 PM    132 mmHg    70 mmHg    76 bpm    18 rpm    98.2 F    166 lbs    69 in 

                          24.5137 kg/m    1.9146 m                  

 

       2013    2:46:00 PM    128 mmHg    70 mmHg    76 bpm    16 rpm    98 F    160 lbs    69 in     

                23.63 kg/m2     1.88 m2                          

 

        2011    8:49:00 AM    128 mmHg    78 mmHg    70 bpm    18 rpm    97.9 F    164 lbs    69 in

                          24.2183 kg/m    1.903 m                  

 

     2011    1:31:00 PM    132 mmHg    68 mmHg    84 bpm         97 F    167 lbs                        

                                         

 

        2011    9:09:00 AM    128 mmHg    70 mmHg    72 bpm    18 rpm    98.2 F    163 lbs    64 in 

                          27.9786 kg/m    1.8272 m                  

 

       2011    10:01:00 AM    132 mmHg    70 mmHg    72 bpm    18 rpm    98.2 F    154 lbs             

                                                                 

 

       2011    2:47:00 PM    128 mmHg    70 mmHg    72 bpm    18 rpm    97.8 F    156 lbs             

                                                                 

 

       5/10/2011    3:16:00 PM    144 mmHg    80 mmHg    72 bpm    18 rpm    98.2 F    158 lbs             

                                                                 

 

        2011    10:11:00 AM    132 mmHg    70 mmHg    70 bpm    18 rpm    98.2 F    168 lbs    69 in

                          24.809 kg/m    1.9261 m                  

 

        4/15/2011    10:52:00 AM    110 mmHg    60 mmHg    75 bpm    16 rpm    97.5 F    172.375 lbs    

69 in                     25.46 kg/m2    1.95 m2                   100 %

 

        2011    11:43:00 AM    120 mmHg    82 mmHg    75 bpm    16 rpm    97.2 F    178.5 lbs    69

 in                       26.3596 kg/m    1.9854 m                 100 %

 

        10/15/2010    1:32:00 PM    120 mmHg    70 mmHg    80 bpm    18 rpm    96.6 F    177 lbs    69 in

                          26.14 kg/m2    1.98 m2                   100 %

 

        2010    3:50:00 PM    168 mmHg    100 mmHg    82 bpm    18 rpm    97.8 F    177.5 lbs    69

 in                       26.2119 kg/m    1.9798 m                 97 %

 

        2010    1:21:00 PM    140 mmHg    80 mmHg    59 bpm    16 rpm    97.6 F    173.25 lbs    69 

in                        25.58 kg/m2    1.96 m2                   100 %

 

        2010    3:02:00 PM    140 mmHg    80 mmHg    61 bpm    16 rpm    97.6 F    173.125 lbs    69

 in                       25.5658 kg/m    1.9553 m                 99 %

 

        2010    1:23:00 PM    130 mmHg    80 mmHg    66 bpm    16 rpm    96.8 F    173 lbs    69 in 

                          25.55 kg/m2    1.95 m2                   100 %

 

        2010    12:58:00 PM    130 mmHg    88 mmHg    75 bpm    16 rpm    98.4 F    172.25 lbs    69

 in                       25.4366 kg/m    1.9503 m                 100 %







Social History







                    Name                Description         Comments

 

                    denies alcohol use                         

 

                    denies smoking                           

 

                    Denies illicit substance abuse                         

 

                    retired                                 direct care

 

                    Single                                   

 

                    Exercises regularly                         

 

                    Attended some college                         

 

                    Tobacco             Never smoker         







History of Procedures







                    Date Ordered        Description         Order Status

 

                    2010 12:00 AM    COMPREHEN METABOLIC PANEL    Reviewed

 

                    2010 12:00 AM    COMPLETE CBC W/AUTO DIFF WBC    Reviewed

 

                    2010 12:00 AM    LIPID PANEL         Reviewed

 

                          2015 12:00 AM        B12 Injection, Up to 1000 Mcg NDC#9141-2206-13 [facility] 

                                        Reviewed

 

                    2011 12:00 AM    MAMMOGRAM SCREENING    Reviewed

 

                    2011 12:00 AM    CYTOPATH C/V THIN LAYER    Reviewed

 

                    2011 12:00 AM    B12 Injection 1 cc NDC#[address]-42    Reviewed

 

                    2015 12:00 AM    THER/PROPH/DIAG INJ SC/IM    Reviewed

 

                    2015 12:00 AM    B12 Injection, Up to 1000 Mcg NDC#9099-6780-42    Reviewed

 

                    2011 12:00 AM    THER/PROPH/DIAG INJ SC/IM    Reviewed

 

                    2011 12:00 AM    B12 Injection(Arabella) Ndc#5655-7297-18-    Reviewed

 

                    2015 12:00 AM    THER/PROPH/DIAG INJ SC/IM    Reviewed

 

                    2015 12:00 AM    B12 Injection, Up to 1000 Mcg NDC#4902-3883-01    Reviewed

 

                    10/20/2011 12:00 AM    THER/PROPH/DIAG INJ SC/IM    Reviewed

 

                    10/20/2011 12:00 AM    B12 Injection(Arabella) Ndc#0028-8832-45-    Reviewed

 

                    2016 12:00 AM    THER/PROPH/DIAG INJ SC/IM    Reviewed

 

                    2016 12:00 AM    B12 Injection, Up to 1000 Mcg NDC#6522-6813-31    Reviewed

 

                    3/14/2016 12:00 AM    VITAMIN B-12        Reviewed

 

                    3/15/2016 12:00 AM    THER/PROPH/DIAG INJ SC/IM    Reviewed

 

                    3/15/2016 12:00 AM    B12 Injection, Up to 1000 Mcg NDC#7449-7458-08    Reviewed

 

                    2011 12:00 AM    ***Immunization administration, Medicare flu    Reviewed

 

                    2011 12:00 AM    Fluzone ** MEDICARE Only **    Reviewed

 

                    2011 12:00 AM    THER/PROPH/DIAG INJ SC/IM    Reviewed

 

                    2011 12:00 AM    B12 Injection ([employer]) Ndc#1467-0897-70    Reviewed

 

                    2016 12:00 AM    B12 Injection, Up to 1000 Mcg NDC#8698-0631-85 [facility]    

Reviewed

 

                    2016 12:00 AM    TTE W/DOPPLER COMPLETE    Reviewed

 

                    2016 12:00 AM    EXTREMITY STUDY     Reviewed

 

                          2016 12:00 AM        B12 Injection, Up to 1000 Mcg NDC#6431-8045-43 [facility] 

                                        Reviewed

 

                    2016 12:00 AM    THER/PROPH/DIAG INJ SC/IM    Reviewed

 

                    2016 12:00 AM    B12 Injection, Up to 1000 Mcg NDC#1564-7942-96    Reviewed

 

                    2016 12:00 AM    THER/PROPH/DIAG INJ SC/IM    Reviewed

 

                    2012 12:00 AM    B12 Injection(Arabella) Ndc#3649-0044-90-    Reviewed

 

                    2016 12:00 AM    B12 Injection, Up to 1000 Mcg NDC#4392-4256-83    Reviewed

 

                    2016 12:00 AM    THER/PROPH/DIAG INJ SC/IM    Reviewed

 

                    2012 12:00 AM    THER/PROPH/DIAG INJ SC/IM    Reviewed

 

                    2012 12:00 AM    B12 Injection ([employer]) Ndc#6408-3857-57    Reviewed

 

                    2016 12:00 AM    THER/PROPH/DIAG INJ SC/IM    Reviewed

 

                    2016 12:00 AM    B12 Injection, Up to 1000 Mcg NDC#0442-5722-82    Reviewed

 

                    2016 12:00 AM    B12 Injection, Up to 1000 Mcg NDC#9905-6860-54    Reviewed

 

                    2016 12:00 AM    THER/PROPH/DIAG INJ SC/IM    Reviewed

 

                    2012 12:00 AM    THER/PROPH/DIAG INJ SC/IM    Reviewed

 

                    2012 12:00 AM    B12 Injection(Arabelal) Ndc#3648-9395-20-    Reviewed

 

                    12/15/2016 12:00 AM    B12 Injection, Up to 1000 Mcg NDC#8997-1780-25    Reviewed

 

                    12/15/2016 12:00 AM    THER/PROPH/DIAG INJ SC/IM    Reviewed

 

                    2016 12:00 AM    URNLS DIP STICK/TABLET RGNT AUTO W/O MICROSCOPY    Reviewed

 

                    1/3/2017 12:00 AM    URNLS DIP STICK/TABLET RGNT AUTO W/O MICROSCOPY    Reviewed

 

                    2017 12:00 AM    URINE CULTURE/COLONY COUNT    Reviewed

 

                    2017 12:00 AM    Rocephin 1 gram Injection, [facility]    Reviewed

 

                    2017 12:00 AM    THERAPEUTIC PROPHYLACTIC/DX INJECTION SUBQ/IM    Reviewed

 

                    2017 12:00 AM    B12 1000mcg Injection, [facility]    Reviewed

 

                    5/3/2012 12:00 AM    THER/PROPH/DIAG INJ SC/IM    Reviewed

 

                    5/3/2012 12:00 AM    B12 Injection([facility]) Ndc#6639-4246-99-    Reviewed

 

                    2017 12:00 AM    THERAPEUTIC PROPHYLACTIC/DX INJECTION SUBQ/IM    Reviewed

 

                    2017 12:00 AM    B12 1000mcg Injection, [facility] [employer]    Reviewed

 

                    2017 12:00 AM    MRI BRAIN STEM W/O & W/DYE    Reviewed

 

                    2017 12:00 AM    VITAMIN B-12        Reviewed

 

                    2017 12:00 AM    Speech Therapy Consult    Reviewed

 

                    2017 12:00 AM    ASSAY OF CREATININE    Reviewed

 

                    2012 12:00 AM    IMMUNOTHERAPY INJECTIONS    Reviewed

 

                    2012 12:00 AM    B12 Injection([facility]) Ndc#8232-7430-96-    Reviewed

 

                    2017 12:00 AM    URINALYSIS AUTO W/SCOPE    Reviewed

 

                    2012 12:00 AM    THER/PROPH/DIAG INJ SC/IM    Reviewed

 

                    2012 12:00 AM    B12 Injection, Up to 1000 Mcg NDC#6889-0071-71    Reviewed

 

                    2017 12:00 AM    URINALYSIS AUTO W/SCOPE    Reviewed

 

                    2017 2:18 PM    URINALYSIS AUTO W/O SCOPE    Reviewed

 

                    2017 12:00 AM    URINE CULTURE/COLONY COUNT    Reviewed

 

                    2017 12:00 AM    B12 1000mcg Injection    Reviewed

 

                    2017 12:00 AM    URNLS DIP STICK/TABLET RGNT AUTO W/O MICROSCOPY    Reviewed

 

                    2017 12:00 AM    METABOLIC PANEL TOTAL CA    Reviewed

 

                    2017 12:00 AM    URNLS DIP STICK/TABLET RGNT AUTO W/O MICROSCOPY    Reviewed

 

                    2012 12:00 AM    THER/PROPH/DIAG INJ SC/IM    Reviewed

 

                    2012 12:00 AM    B12 Injection, Up to 1000 Mcg NDC#2325-8367-33    Reviewed

 

                    2012 12:00 AM    THER/PROPH/DIAG INJ SC/IM    Reviewed

 

                    2012 12:00 AM    B12 Injection, Up to 1000 Mcg NDC#2165-2882-47    Reviewed

 

                    10/16/2012 12:00 AM    THER/PROPH/DIAG INJ SC/IM    Reviewed

 

                    10/16/2012 12:00 AM    B12 Injection, Up to 1000 Mcg NDC#6954-0474-90    Reviewed

 

                    2010 12:00 AM    COMPREHEN METABOLIC PANEL    Reviewed

 

                    2010 12:00 AM    COMPLETE CBC W/AUTO DIFF WBC    Reviewed

 

                    2010 12:00 AM    LIPID PANEL         Reviewed

 

                    2013 12:00 AM    Flu Injection 3 Years And Above NDC# [address]-27  [facility]    Reviewed



 

                    2013 12:00 AM    COMPLETE CBC W/AUTO DIFF WBC    Reviewed

 

                    2013 12:00 AM    ASSAY OF LITHIUM    Reviewed

 

                    2013 12:00 AM    METABOLIC PANEL TOTAL CA    Reviewed

 

                    4/3/2013 12:00 AM    THER/PROPH/DIAG INJ SC/IM    Reviewed

 

                    4/3/2013 12:00 AM    B12 Injection, Up to 1000 Mcg NDC#2251-0595-76    Reviewed

 

                    2013 12:00 AM    THER/PROPH/DIAG INJ SC/IM    Reviewed

 

                    2013 12:00 AM    B12 Injection, Up to 1000 Mcg NDC#2198-2805-06    Reviewed

 

                    2013 12:00 AM    THER/PROPH/DIAG INJ SC/IM    Reviewed

 

                    2013 12:00 AM    B12 Injection, Up to 1000 Mcg NDC#8760-4214-29    Reviewed

 

                    2013 12:00 AM    LIPID PANEL         Reviewed

 

                    2013 12:00 AM    VITAMIN D 25 HYDROXY    Reviewed

 

                    2013 12:00 AM    THER/PROPH/DIAG INJ SC/IM    Reviewed

 

                    2013 12:00 AM    B12 Injection, Up to 1000 Mcg NDC#5066-5950-09    Reviewed

 

                    2013 12:00 AM    THER/PROPH/DIAG INJ SC/IM    Reviewed

 

                    3/6/2014 12:00 AM    THER/PROPH/DIAG INJ SC/IM    Reviewed

 

                    2014 12:00 AM    THER/PROPH/DIAG INJ SC/IM    Reviewed

 

                    2014 12:00 AM    B12 Injection, Up to 1000 Mcg NDC#0006-5766-97    Reviewed

 

                    2010 12:00 AM    SKIN FUNGI CULTURE    Reviewed

 

                    10/9/2010 12:00 AM    COMPREHEN METABOLIC PANEL    Reviewed

 

                    10/9/2010 12:00 AM    LIPID PANEL         Reviewed

 

                    2010 12:00 AM    THER/PROPH/DIAG INJ SC/IM    Reviewed

 

                    2010 12:00 AM    B12 Injection Ndc#[address]-32 ([facility])    Reviewed

 

                    2010 12:00 AM    THER/PROPH/DIAG INJ SC/IM    Reviewed

 

                    2010 12:00 AM    Kenalog 40 Mg Im-Ndc#[address]-13 ([facility])    Reviewed

 

                    10/15/2010 12:00 AM    FLU VACCINE 3 YRS & > IM    Reviewed

 

                    10/15/2010 12:00 AM    Admin.Of M/C Cov.Vaccine-Flu Vacc.    Reviewed

 

                    1/15/2011 12:00 AM    COMPLETE CBC W/AUTO DIFF WBC    Reviewed

 

                    1/15/2011 12:00 AM    COMPREHEN METABOLIC PANEL    Reviewed

 

                    1/15/2011 12:00 AM    LIPID PANEL         Reviewed

 

                    2014 12:00 AM    MAMMOGRAM SCREENING    Reviewed

 

                    2014 12:00 AM    Screening mammography, bilateral    Reviewed

 

                    7/10/2014 12:00 AM    THER/PROPH/DIAG INJ SC/IM    Reviewed

 

                    7/10/2014 12:00 AM    B12 Injection, Up to 1000 Mcg NDC#6584-9441-25    Reviewed

 

                    2011 12:00 AM    COMPLETE CBC W/AUTO DIFF WBC    Reviewed

 

                    2011 12:00 AM    COMPREHEN METABOLIC PANEL    Reviewed

 

                    2011 12:00 AM    LIPID PANEL         Reviewed

 

                    2014 12:00 AM    B12 Injection, Up to 1000 Mcg NDC#0008-7106-77    Reviewed

 

                    10/19/2014 12:00 AM    MAMMOGRAM SCREENING    Reviewed

 

                    10/19/2014 12:00 AM    Screening mammography, bilateral    Reviewed

 

                    10/16/2014 12:00 AM    B12 Injection, Up to 1000 Mcg NDC#5548-6459-80    Reviewed

 

                    10/16/2014 12:00 AM    COMPLETE CBC W/AUTO DIFF WBC    Reviewed

 

                    10/16/2014 12:00 AM    COMPREHEN METABOLIC PANEL    Reviewed

 

                    10/16/2014 12:00 AM    IMMUNOASSAY TUMOR     Reviewed

 

                    10/16/2014 12:00 AM    LIPID PANEL         Reviewed

 

                    10/16/2014 12:00 AM    ASSAY OF LITHIUM    Reviewed

 

                    10/16/2014 12:00 AM    MAMMOGRAM SCREENING    Reviewed

 

                    2011 12:00 AM    ASSAY OF PARATHORMONE    Reviewed

 

                    2011 12:00 AM    VITAMIN D 25 HYDROXY    Reviewed

 

                    2011 12:00 AM    ASSAY OF LITHIUM    Reviewed

 

                    2011 12:00 AM    METABOLIC PANEL TOTAL CA    Reviewed

 

                    2011 12:00 AM    CT HEAD/BRAIN W/O & W/DYE    Reviewed

 

                    3/23/2015 12:00 AM    PNEUMOCOCCAL VACC 13 GLENDY IM    Reviewed

 

                    3/23/2015 12:00 AM    Vitamin B12 injection    Reviewed

 

                    2011 12:00 AM    ASSAY OF LITHIUM    Reviewed

 

                    2011 12:00 AM    B12 Injection Ndc#[address]-77  [facility]    Reviewed

 

                    2015 12:00 AM    THER/PROPH/DIAG INJ SC/IM    Reviewed

 

                    2015 12:00 AM    B12 Injection, Up to 1000 Mcg NDC#6175-2504-05    Reviewed

 

                    2015 12:00 AM    COMPLETE CBC W/AUTO DIFF WBC    Reviewed

 

                    2015 12:00 AM    COMPREHEN METABOLIC PANEL    Reviewed

 

                    2015 12:00 AM    LIPID PANEL         Reviewed

 

                    2015 12:00 AM    ASSAY OF LITHIUM    Reviewed

 

                    2011 12:00 AM    VIT D 1 25-DIHYDROXY    Reviewed

 

                    2011 12:00 AM    VITAMIN B-12        Reviewed

 

                    2015 12:00 AM    B12 Injection, Up to 1000 Mcg NDC#7137-2676-69    Reviewed

 

                    2015 12:00 AM    THER/PROPH/DIAG INJ SC/IM    Reviewed

 

                    2015 12:00 AM    B12 Injection, Up to 1000 Mcg NDC#1419-6659-15    Reviewed

 

                    2011 12:00 AM    THER/PROPH/DIAG INJ SC/IM    Reviewed

 

                    2011 12:00 AM    B12 Injection ([facility]) Ndc#3320-9510-75    Reviewed

 

                    2015 12:00 AM    THER/PROPH/DIAG INJ SC/IM    Reviewed

 

                    2015 12:00 AM    B12 Injection, Up to 1000 Mcg NDC#6791-1889-34    Reviewed







Results Summary







                          Date and Description      Results

 

                          2004 12:00 AM        Colonoscopy-Women and Men over 50 Normal 

 

                          2008 12:00 AM         Pap Smear Declined 

 

                          10/7/2009 12:00 AM        Cholest Cry Stone Ql .0 %LDLc SerPl-mCnc 123.0 mg/dLHDLc

 SerPl-mCnc 34.0 mg/dLTrigl SerPl-mCnc 190.0 mg/dLGlucose SerPl-mCnc 78.0 mg/dL

 

                          2009 12:00 AM        Mammogram -Women over 40 Normal HIV1+2 Ab Ser Ql no risk 

 

                          2010 8:47 AM         Dexa Bone Scan Refused Aspirin reccommended Contraindication 



 

                          2010 8:48 AM         Depression Done 

 

                          2010 12:00 AM         Foot Exam-Diabetic Done 

 

                          2010 12:00 AM         Cholest Cry Stone Ql .0 %LDLc SerPl-mCnc 126.0 mg/dLGlucose

 SerPl-mCnc 102.0 mg/dL

 

                          2010 8:45 AM          TRIGLYCERIDES 122.0 mg/dLCHOLESTEROL 186.0 mg/dLHDL 36.0 mg/dLTOT

 CHOL/HDL 5.2 LDL (CALC) 126.0 mg/dLGLUCOSE 102.0 mg/dLSODIUM 143.0 
mmol/LPOTASSIUM 3.70 mmol/LCHLORIDE 111.0 mmol/LCO2 23.0 mmol/LBUN 10.0 
mg/dLCREATININE 0.80 mg/dLSGOT/AST 12.0 IU/LSGPT/ALT 11.0 IU/LALK PHOS 65.0 
IU/LTOTAL PROTEIN 7.20 g/dLALBUMIN 3.90 g/dLTOTAL BILI 0.50 mg/dLCALCIUM 10.20 
mg/dLAGE 59 GFR NonAA 73 GFR AA 88 eGFR >60 mL/min/1.73 m2eGFR AA* >60 WBC 5.7 
RBC 3.26 HGB 10.60 g/dLHCT 31.70 %MCV 97.0 fLMCH 32.50 pgMCHC 33.40 g/dLRDW SD 
47 RDW CV 13.30 %MPV 9.70 fLPLT 287 NRBC# 0.00 NRBC% 0.0 %NEUT 62.90 %%LYMP 
21.80 %%MONO 9.90 %%EOS 5.0 %%BASO 0.40 %#NEUT 3.56 #LYMP 1.23 #MONO 0.56 #EOS 
0.28 #BASO 0.02 MANUAL DIFF NOT IND 

 

                          2010 12:00 AM        Glucose SerPl-mCnc 96.0 mg/dLCholest Cry Stone Ql .0 %LDLc

 SerPl-mCnc 146.0 mg/dL

 

                          2010 8:26 AM         TRIGLYCERIDES 106.0 mg/dLCHOLESTEROL 199.0 mg/dLHDL 32.0 mg/dLTOT

 CHOL/HDL 6.2 LDL (CALC) 146.0 mg/dLGLUCOSE 96.0 mg/dLSODIUM 143.0 
mmol/LPOTASSIUM 4.0 mmol/LCHLORIDE 113.0 mmol/LCO2 24.0 mmol/LBUN 13.0 
mg/dLCREATININE 1.0 mg/dLSGOT/AST 11.0 IU/LSGPT/ALT 6.0 IU/LALK PHOS 56.0 
IU/LTOTAL PROTEIN 6.60 g/dLALBUMIN 3.80 g/dLTOTAL BILI 0.50 mg/dLCALCIUM 9.30 
mg/dLAGE 59 GFR NonAA 57 GFR AA 69 eGFR 57 eGFR AA* >60 

 

                          10/6/2010 12:00 AM        Cholest Cry Stone Ql .0 %LDLc SerPl-mCnc 111.0 mg/dLGlucose

 SerPl-mCnc 81.0 mg/dL

 

                          10/6/2010 2:45 PM         TRIGLYCERIDES 123.0 mg/dLCHOLESTEROL 178.0 mg/dLHDL 42.0 mg/dLTOT

 CHOL/HDL 4.2 LDL (CALC) 111.0 mg/dLGLUCOSE 81.0 mg/dLSODIUM 139.0 
mmol/LPOTASSIUM 4.10 mmol/LCHLORIDE 106.0 mmol/LCO2 24.0 mmol/LBUN 13.0 
mg/dLCREATININE 0.90 mg/dLSGOT/AST 13.0 IU/LSGPT/ALT 11.0 IU/LALK PHOS 61.0 
IU/LTOTAL PROTEIN 7.10 g/dLALBUMIN 3.90 g/dLTOTAL BILI 0.30 mg/dLCALCIUM 9.30 
mg/dLAGE 60 GFR NonAA 64 GFR AA 78 eGFR >60 mL/min/1.73 m2eGFR AA* >60 WBC 6.9 
RBC 3.59 HGB 11.50 g/dLHCT 35.30 %MCV 98.0 fLMCH 32.0 pgMCHC 32.60 g/dLRDW SD 46
 RDW CV 12.90 %MPV 9.90 fLPLT 311 NRBC# 0.00 NRBC% 0.0 %NEUT 64.90 %%LYMP 22.50 
%%MONO 7.20 %%EOS 5.10 %%BASO 0.30 %#NEUT 4.45 #LYMP 1.54 #MONO 0.49 #EOS 0.35 
#BASO 0.02 MANUAL DIFF NOT IND 

 

                          2011 12:00 AM         Mammogram -Women over 40 Ordered 

 

                          2011 10:25 AM        TRIGLYCERIDES 111.0 mg/dLCHOLESTEROL 195.0 mg/dLHDL 43.0 mg/dLTOT

 CHOL/HDL 4.5 LDL (CALC) 130.0 mg/dLWBC 5.3 RBC 3.76 HGB 12.0 g/dLHCT 37.80 %MCV
 101.0 fLMCH 31.90 pgMCHC 31.70 g/dLRDW SD 47 RDW CV 13.0 %MPV 9.70 fLPLT 259 
NRBC# 0.00 NRBC% 0.0 %NEUT 69.0 %%LYMP 17.60 %%MONO 8.30 %%EOS 4.70 %%BASO 0.40 
%#NEUT 3.63 #LYMP 0.93 #MONO 0.44 #EOS 0.25 #BASO 0.02 MANUAL DIFF NOT IND 
GLUCOSE 102.0 mg/dLSODIUM 146.0 mmol/LPOTASSIUM 4.20 mmol/LCHLORIDE 113.0 
mmol/LCO2 23.0 mmol/LBUN 15.0 mg/dLCREATININE 1.0 mg/dLSGOT/AST 12.0 
IU/LSGPT/ALT 17.0 IU/LALK PHOS 60.0 IU/LTOTAL PROTEIN 6.90 g/dLALBUMIN 4.20 
g/dLTOTAL BILI 0.40 mg/dLCALCIUM 9.70 mg/dLAGE 60 GFR NonAA 57 GFR AA 69 eGFR 57
 eGFR AA* >60 

 

                          2011 11:49 AM        Cholest Cry Stone Ql .0 %LDLc SerPl-mCnc 130.0 mg/dLHDLc

 SerPl-mCnc 43.0 mg/dLTrigl SerPl-mCnc 111.0 mg/dLGlucose SerPl-mCnc 102.0 mg/dL

 

                          2011 11:52 AM        Pap Smear Declined 

 

                          2011 11:28 AM        Lithium 2.080 mmol/LGLUCOSE 102.0 mg/dLSODIUM 135.0 mmol/LPOTASSIUM

 3.90 mmol/LCHLORIDE 106.0 mmol/LCO2 21.0 mmol/LBUN 12.0 mg/dLCREATININE 1.30 
mg/dLCALCIUM 10.70 mg/dLAGE 60 GFR NonAA 42 GFR AA 51 eGFR 42 eGFR AA* 51 

 

                          2011 8:58 AM          Lithium 0.690 mmol/L

 

                          2011 2:38 PM         VITAMIN B12 3483.0 pg/mL

 

                          2013 3:35 PM          WBC 5.1 RBC 3.73 HGB 11.70 g/dLHCT 36.40 %MCV 98.0 fLMCH 31.40

 pgMCHC 32.10 g/dLRDW SD 47 RDW CV 13.10 %MPV 9.80 fLPLT 224 NRBC# 0.00 NRBC% 
0.0 %NEUT 66.80 %%LYMP 19.10 %%MONO 9.0 %%EOS 4.90 %%BASO 0.20 %#NEUT 3.42 #LYMP
 0.98 #MONO 0.46 #EOS 0.25 #BASO 0.01 MANUAL DIFF NOT IND GLUCOSE 88.0 
mg/dLSODIUM 141.0 mmol/LPOTASSIUM 4.10 mmol/LCHLORIDE 110.0 mmol/LCO2 22.0 
mmol/LBUN 22.0 mg/dLCREATININE 1.10 mg/dLCALCIUM 9.80 mg/dLAGE 62 GFR NonAA 50 
GFR AA 61 eGFR 50 eGFR AA* 60 Lithium 0.760 mmol/L

 

                          2013 11:02 AM        TRIGLYCERIDES 106.0 mg/dLCHOLESTEROL 181.0 mg/dLHDL 46.0 mg/dLTOT

 CHOL/HDL 3.9 LDL (CALC) 114.0 mg/dLVITAMIN D 41.10 ng/mL

 

                          10/17/2014 10:10 AM       WBC 5.0 RBC 3.66 HGB 11.60 g/dLHCT 36.80 %.0 fLMCH 31.70

 pgMCHC 31.50 g/dLRDW SD 50 RDW CV 13.50 %MPV 10.10 fLPLT 209 NRBC# 0.00 NRBC% 
0.0 %NEUT 69.20 %%LYMP 21.0 %%MONO 6.40 %%EOS 3.20 %%BASO 0.20 %#NEUT 3.46 #LYMP
 1.05 #MONO 0.32 #EOS 0.16 #BASO 0.01 MANUAL DIFF NOT IND GLUCOSE 100.0 
mg/dLSODIUM 148.0 mmol/LPOTASSIUM 3.90 mmol/LCHLORIDE 114.0 mmol/LCO2 26.0 
mmol/LBUN 12.0 mg/dLCREATININE 1.20 mg/dLSGOT/AST 9.0 IU/LSGPT/ALT <6 IU/LALK 
PHOS 82.0 IU/LTOTAL PROTEIN 6.90 g/dLALBUMIN 4.0 g/dLTOTAL BILI 0.40 
mg/dLCALCIUM 10.50 mg/dLAGE 64 GFR NonAA 45 GFR AA 55 eGFR 45 eGFR AA* 55 
TRIGLYCERIDES 96.0 mg/dLCHOLESTEROL 155.0 mg/dLHDL 38.0 mg/dLTOT CHOL/HDL 4.1 
LDL (CALC) 98.0 mg/dLLithium 0.850 mmol/LCancer Antigen (CA) 125 8.30 U/mL

 

                          2015 10:25 AM        Lithium 0.790 mmol/LWBC 4.8 RBC 3.44 HGB 11.0 g/dLHCT 35.20 

%.0 fLMCH 32.0 pgMCHC 31.30 g/dLRDW SD 53 RDW CV 14.0 %MPV 9.30 fLPLT 210
 NRBC# 0.00 NRBC% 0.0 %NEUT 70.80 %%LYMP 17.20 %%MONO 8.10 %%EOS 3.50 %%BASO 
0.40 %#NEUT 3.41 #LYMP 0.83 #MONO 0.39 #EOS 0.17 #BASO 0.02 MANUAL DIFF NOT IND 
TRIGLYCERIDES 107.0 mg/dLCHOLESTEROL 174.0 mg/dLHDL 43.0 mg/dLTOT CHOL/HDL 4.0 
LDL (CALC) 110.0 mg/dLGLUCOSE 90.0 mg/dLSODIUM 145.0 mmol/LPOTASSIUM 3.80 
mmol/LCHLORIDE 115.0 mmol/LCO2 24.0 mmol/LBUN 17.0 mg/dLCREATININE 1.30 
mg/dLSGOT/AST 18.0 IU/LSGPT/ALT 17.0 IU/LALK PHOS 56.0 IU/LTOTAL PROTEIN 6.70 
g/dLALBUMIN 3.90 g/dLTOTAL BILI 0.40 mg/dLCALCIUM 9.80 mg/dLAGE 64 GFR NonAA 41 
GFR AA 50 eGFR 41 eGFR AA* 50 

 

                          2015 8:50 AM        WBC 5.8 RBC 3.29 HGB 10.70 g/dLHCT 34.0 %.0 fLMCH 32.50

 pgMCHC 31.50 g/dLRDW SD 52 RDW CV 13.60 %MPV 9.60 fLPLT 223 NRBC# 0.00 NRBC% 
0.0 %NEUT 69.60 %%LYMP 18.90 %%MONO 8.50 %%EOS 2.80 %%BASO 0.20 %#NEUT 4.03 
#LYMP 1.09 #MONO 0.49 #EOS 0.16 #BASO 0.01 MANUAL DIFF NOT IND Lithium 0.620 
mmol/LGLUCOSE 83.0 mg/dLSODIUM 139.0 mmol/LPOTASSIUM 3.90 mmol/LCHLORIDE 109.0 
mmol/LCO2 22.0 mmol/LBUN 19.0 mg/dLCREATININE 1.40 mg/dLSGOT/AST 19.0 
IU/LSGPT/ALT 21.0 IU/LALK PHOS 55.0 IU/LTOTAL PROTEIN 6.50 g/dLALBUMIN 3.90 
g/dLTOTAL BILI 0.50 mg/dLCALCIUM 9.60 mg/dLAGE 65 GFR NonAA 38 GFR AA 46 eGFR 38
 eGFR AA* 46 TRIGLYCERIDES 121.0 mg/dLCHOLESTEROL 192.0 mg/dLHDL 51.0 mg/dLTOT 
CHOL/HDL 3.8 .0 mg/dLFREE T4 0.79 TSH 1.210 uIU/mLHemoglobin A1c 5.40 
%Estim. Avg Glu (eAG) 108 

 

                          3/15/2016 8:08 AM         VITAMIN B12 696.0 pg/mL

 

                          3/23/2016 8:26 AM         WBC 7.0 RBC 3.61 HGB 11.80 g/dLHCT 37.70 %.0 fLMCH 32.70

 pgMCHC 31.30 g/dLRDW SD 49 RDW CV 12.50 %MPV 10.0 fLPLT 207 NRBC# 0.00 NRBC% 
0.0 %NEUT 73.60 %%LYMP 16.40 %%MONO 6.60 %%EOS 3.0 %%BASO 0.30 %#NEUT 5.15 #LYMP
 1.15 #MONO 0.46 #EOS 0.21 #BASO 0.02 MANUAL DIFF NOT IND Lithium 0.940 
mmol/LGLUCOSE 108.0 mg/dLSODIUM 143.0 mmol/LPOTASSIUM 4.30 mmol/LCHLORIDE 110.0 
mmol/LCO2 27.0 mmol/LBUN 16.0 mg/dLCREATININE 1.60 mg/dLSGOT/AST 13.0 
IU/LSGPT/ALT 7.0 IU/LALK PHOS 71.0 IU/LTOTAL PROTEIN 6.80 g/dLALBUMIN 4.0 
g/dLTOTAL BILI 0.20 mg/dLCALCIUM 10.40 mg/dLAGE 65 GFR NonAA 32 GFR AA 39 eGFR 
32 eGFR AA* 39 TRIGLYCERIDES 113.0 mg/dLCHOLESTEROL 169.0 mg/dLHDL 42.0 mg/dLTOT
 CHOL/HDL 4.0 LDL (CALC) 104.0 mg/dLFREE T4 0.86 TSH 2.20 uIU/mLHemoglobin A1c 
5.20 %Estim. Avg Glu (eAG) 103 

 

                          3/25/2016 9:17 AM         COLOR YELLOW APPEARANCE CLEAR SPEC GRAV 1.010 pH 7.0 PROTEIN 

NEGATIVE GLUCOSE NEGATIVE mg/dLKETONE NEGATIVE BILIRUBIN NEGATIVE BLOOD NEGATIVE
 NITRITE NEGATIVE [place] SCREEN SMALL MICRO IND? SEE BELOW WBC/HPF 0-5 RBC/HPF 
NEGATIVE CASTS/LPF NEGATIVE /LPFCRYSTALS NEGATIVE MUCOUS THRDS NEGATIVE BACTERIA
 NEGATIVE EPITH CELLS FEW SQUAMOUS /HPFTRICHOMONAS NEGATIVE YEAST NEGATIVE 

 

                          2016 6:00 AM        GLUCOSE 91.0 mg/dLSODIUM 143.0 mmol/LPOTASSIUM 3.60 mmol/LCHLORIDE

 112.0 mmol/LCO2 23.0 mmol/LBUN 22.0 mg/dLCREATININE 1.20 mg/dLSGOT/AST 15.0 
IU/LSGPT/ALT 12.0 IU/LALK PHOS 61.0 IU/LTOTAL PROTEIN 5.40 g/dLALBUMIN 3.10 
g/dLTOTAL BILI 0.40 mg/dLCALCIUM 8.40 mg/dLAGE 66 GFR NonAA 45 GFR AA 55 eGFR 45
 eGFR AA* 55 WBC 3.0 RBC 3.05 HGB 9.80 g/dLHCT 32.10 %.0 fLMCH 32.10 
pgMCHC 30.50 g/dLRDW SD 54 RDW CV 14.20 %MPV 10.10 fLPLT 170 NRBC# 0.00 NRBC% 
0.0 %NEUT 50.70 %%LYMP 32.60 %%MONO 10.50 %%EOS 5.90 %%BASO 0.30 %#NEUT 1.54 
#LYMP 0.99 #MONO 0.32 #EOS 0.18 #BASO 0.01 MANUAL DIFF NOT IND 

 

                          2016 2:09 PM        COLOR YELLOW APPEARANCE CLEAR SPEC GRAV 1.010 pH 5.0 PROTEIN

 30 GLUCOSE NEGATIVE mg/dLKETONE NEGATIVE BILIRUBIN NEGATIVE BLOOD LARGE NITRITE
 NEGATIVE [place] SCREEN MODERATE MICRO INDICATED? SEE BELOW WBC/HPF  RBC/HPF
 20-50 CASTS/LPF NEGATIVE /LPFCRYSTALS NEGATIVE MUCOUS THRDS NEGATIVE BACTERIA 
NEGATIVE EPITH CELLS FEW SQUAMOUS /HPFTRICHOMONAS NEGATIVE YEAST NEGATIVE CULT 
SET UP? YES 

 

                          1/3/2017 4:08 PM          COLOR YELLOW APPEARANCE HAZY SPEC GRAV 1.015 pH 6.0 PROTEIN 30

 GLUCOSE NEGATIVE mg/dLKETONE NEGATIVE BILIRUBIN NEGATIVE BLOOD MODERATE NITRITE
 NEGATIVE [place] SCREEN LARGE MICRO INDICATED? SEE BELOW WBC/-200 RBC/HPF 
5-10 CASTS/LPF NEGATIVE /LPFCRYSTALS NEGATIVE MUCOUS THRDS NEGATIVE BACTERIA 
NEGATIVE EPITH CELLS 1+ SQUAMOUS /HPFTRICHOMONAS NEGATIVE YEAST NEGATIVE CULT 
SET UP? YES 

 

                          2017 4:24 PM         CREATININE 1.50 mg/dLAGE 66 GFR NonAA 35 GFR AA 42 eGFR 35 eGFR

 AA* 42 VITAMIN B12 1324.0 pg/mL

 

                          2017 11:30 AM         GLUCOSE 93.0 mg/dLSODIUM 143.0 mmol/LPOTASSIUM 3.10 mmol/LCHLORIDE

 101.0 mmol/LCO2 29.0 mmol/LBUN 26.0 mg/dLCREATININE 1.50 mg/dLSGOT/AST 23.0 
IU/LSGPT/ALT 13.0 IU/LALK PHOS 66.0 IU/LTOTAL PROTEIN 7.70 g/dLALBUMIN 4.30 
g/dLTOTAL BILI 0.40 mg/dLCALCIUM 10.30 mg/dLAGE 66 GFR NonAA 35 GFR AA 42 eGFR 
35 eGFR AA* 42 TRIGLYCERIDES 147.0 mg/dLCHOLESTEROL 184.0 mg/dLHDL 44.0 mg/dLTOT
 CHOL/HDL 4.2 .0 mg/dLWBC 5.4 RBC 3.98 HGB 12.90 g/dLHCT 40.20 %.0
 fLMCH 32.40 pgMCHC 32.10 g/dLRDW SD 50 RDW CV 13.50 %MPV 9.30 fLPLT 210 NRBC# 
0.00 NRBC% 0.0 %NEUT 54.20 %%LYMP 30.70 %%MONO 9.10 %%EOS 5.20 %%BASO 0.40 
%#NEUT 2.94 #LYMP 1.66 #MONO 0.49 #EOS 0.28 #BASO 0.02 MANUAL DIFF NOT IND FREE 
T4 1.09 COLOR YELLOW APPEARANCE CLEAR SPEC GRAV <=1.005 pH 5.5 PROTEIN NEGATIVE 
GLUCOSE NEGATIVE mg/dLKETONE NEGATIVE BILIRUBIN NEGATIVE BLOOD NEGATIVE NITRITE 
NEGATIVE [place] SCREEN LARGE MICRO INDICATED? SEE BELOW WBC/HPF 10-20 RBC/HPF 0-5 
CASTS/LPF NEGATIVE /LPFCRYSTALS NEGATIVE MUCOUS THRDS NEGATIVE BACTERIA FEW 
EPITH CELLS 1+ SQUAMOUS /HPFTRICHOMONAS NEGATIVE YEAST NEGATIVE CULT SET UP? YES
 TSH 1.820 uIU/mL

 

                          2017 2:45 PM         COLOR YELLOW APPEARANCE CLEAR SPEC GRAV <=1.005 pH 6.0 PROTEIN

 NEGATIVE GLUCOSE NEGATIVE mg/dLKETONE NEGATIVE BILIRUBIN NEGATIVE BLOOD TRACE-
INTACT NITRITE NEGATIVE [place] SCREEN SMALL MICRO INDICATED? SEE BELOW WBC/HPF 0-5
 RBC/HPF NEGATIVE CASTS/LPF NEGATIVE /LPFCRYSTALS NEGATIVE MUCOUS THRDS NEGATIVE
 BACTERIA FEW EPITH CELLS FEW SQUAMOUS /HPFTRICHOMONAS NEGATIVE YEAST NEGATIVE 
CULT SET UP? YES 

 

                          2017 11:22 AM        COLOR YELLOW APPEARANCE CLEAR SPEC GRAV <=1.005 pH 6.5 PROTEIN

 NEGATIVE GLUCOSE NEGATIVE mg/dLKETONE NEGATIVE BILIRUBIN NEGATIVE BLOOD 
NEGATIVE NITRITE NEGATIVE [place] SCREEN NEGATIVE MICRO INDICATED? NOT INDICATED 

 

                          2017 2:18 PM         Clarity Ur cloudy Color Ur dark yellow Glucose Ur-sCnc negative

 Bilirub Ur Ql Strip negative Ketones Ur Ql Strip negative Sp Gr Ur Qn 1.010 Hgb
 Ur Ql Strip trace-intact pH Ur-LsCnc 6.5 Prot Ur Ql Strip trace Urobilinogen 
Ur-mCnc 0.2 Nitrite Ur Ql Strip negative WBC Est Ur Ql Strip large 

 

                          2017 9:28 AM         GLUCOSE 109.0 mg/dLSODIUM 142.0 mmol/LPOTASSIUM 3.60 mmol/LCHLORIDE

 106.0 mmol/LCO2 23.0 mmol/LBUN 11.0 mg/dLCREATININE 1.30 mg/dLCALCIUM 9.80 
mg/dLAGE 66 GFR NonAA 41 GFR AA 50 eGFR 41 eGFR AA* 50 

 

                          2017 9:52 AM         COLOR YELLOW APPEARANCE CLOUDY SPEC GRAV <=1.005 pH 6.5 PROTEIN

 NEGATIVE GLUCOSE NEGATIVE mg/dLKETONE NEGATIVE BILIRUBIN NEGATIVE BLOOD SMALL 
NITRITE POSITIVE [place] SCREEN LARGE MICRO INDICATED? SEE BELOW WBC/-300 
RBC/HPF 5-10 CASTS/LPF NEGATIVE /LPFCRYSTALS NEGATIVE MUCOUS THRDS NEGATIVE 
BACTERIA 2++ EPITH CELLS 1+ SQUAMOUS /HPFTRICHOMONAS NEGATIVE YEAST NEGATIVE 
CULT SET UP? YES 

 

                          2017 10:41 AM         COLOR YELLOW APPEARANCE CLEAR SPEC GRAV <=1.005 pH 6.0 PROTEIN

 NEGATIVE GLUCOSE NEGATIVE mg/dLKETONE NEGATIVE BILIRUBIN NEGATIVE BLOOD 
NEGATIVE NITRITE NEGATIVE [place] SCREEN TRACE MICRO INDICATED? SEE BELOW WBC/HPF 
0-5 RBC/HPF RARE CASTS/LPF NEGATIVE /LPFCRYSTALS NEGATIVE MUCOUS THRDS NEGATIVE 
BACTERIA FEW EPITH CELLS 1+ SQUAMOUS /HPFTRICHOMONAS NEGATIVE YEAST NEGATIVE 
CULT SET UP? NO 







History Of Immunizations







       Name    Date Admin    Mfg Name    Mfg Code    Trade Name    Lot#    Route    Inj    Vis Given    Vis

 Pub                                    CVX

 

        Influenza    2008    Not Entered    NE      Not Entered            Not Entered    Not Entered

                    1            999

 

        X       12/19/2008    [employer]    MSD     Pneumovax 23            Intramuscular    Not Entered

                    1            999

 

           Influenza    10/15/2010    [employer] [facility]    NOV        Fluvirin > 12 Years    

[id number]     Intramuscular    Left Deltoid    10/15/2010    2009    999

 

          X         3/23/2015    [facility]    [facility]       Prevnar 13    [id number]    Intramuscular

                Right Gluteous Medius    3/23/2015       2013       109







History of Past Illness







                    Name                Date of Onset       Comments

 

                    Peritoneal Neoplasm, Malignant                         

 

                    Hyperlipidemia                           

 

                    Bipolar disorder, unspecified                         

 

                    Artificial opening status; colostomy                         

 

                    B12 deficiency                           

 

                    Anemia, Pernicious                         

 

                    Arthritis unspecified                         

 

                    cervical cancer                          

 

                    Artificial opening status; colostomy    2010  1:10PM     

 

                    Bipolar disorder, unspecified    2010  1:10PM     

 

                    Hyperlipidemia      2010  1:10PM     

 

                    Anemia, Pernicious    2010  1:10PM     

 

                    Postoperative Follow-Up    2010  1:55PM     

 

                    Postoperative Follow-Up    Mar  8 2010 10:57AM     

 

                    Artificial opening status; colostomy    Mar  8 2010  1:19PM     

 

                    Peritoneal Neoplasm, Malignant    Mar  8 2010  1:19PM     

 

                    Artificial opening status; colostomy    2010  1:40PM     

 

                    Hyperlipidemia      2010  1:40PM     

 

                    Anemia, Pernicious    2010  1:40PM     

 

                    Peritoneal Neoplasm, Malignant    2010  1:40PM     

 

                    Arthritis unspecified    2010  1:40PM     

 

                    Anemia of Chronic Illness    2010  1:40PM     

 

                    Tinea corporis      2010  3:17PM     

 

                    Bipolar disorder, unspecified    2010  1:33PM     

 

                    Hyperlipidemia      2010  1:33PM     

 

                    Anemia, Pernicious    2010  1:33PM     

 

                    Peritoneal Neoplasm, Malignant    2010  1:33PM     

 

                    B12 deficiency      2010  1:33PM     

 

                    Ethmoidal Sinusitis, Acute    Sep 21 2010  3:53PM     

 

                    Wheezing            Sep 21 2010  3:53PM     

 

                    Flu                 Oct 15 2010  1:40PM     

 

                    Bipolar disorder, unspecified    Oct 15 2010  1:42PM     

 

                    Hyperlipidemia      Oct 15 2010  1:42PM     

 

                    Anemia, Pernicious    Oct 15 2010  1:42PM     

 

                    Peritoneal Neoplasm, Malignant    Oct 15 2010  1:42PM     

 

                    Bipolar disorder, unspecified    2011 12:01PM     

 

                    Hyperlipidemia      2011 12:01PM     

 

                    Anemia, Pernicious    2011 12:01PM     

 

                    Peritoneal Neoplasm, Malignant    2011 12:01PM     

 

                    Bipolar disorder, unspecified    Apr 15 2011 10:55AM     

 

                    Major Depression    2011 10:11AM     

 

                    Bipolar Disorder    2011 10:11AM     

 

                    Cancer              May 10 2011  4:16PM     

 

                    Major Depression    May 10 2011  3:16PM     

 

                    Bipolar Disorder    May 10 2011  3:16PM     

 

                    Hypercalcemia       May 23 2011  2:47PM     

 

                    Bipolar disorder, unspecified    May 23 2011  2:47PM     

 

                    Colon Cancer, Personal History    May 23 2011  2:47PM     

 

                    Bipolar Disorder    May 31 2011  4:39PM     

 

                    Depressive Disorder    2011 10:01AM     

 

                    Vitamin B12 deficiency    2011 10:01AM     

 

                    Vitamin D Deficiency    2011  5:07PM     

 

                    Anemia, Vitamin B12 Deficiency    2011  5:07PM     

 

                    B12 deficiency      2011  3:56PM     

 

                    Routine gynecological examination    Aug  4 2011  9:08AM     

 

                    Screening Examination for Breast Cancer    Aug  4 2011  9:08AM     

 

                    Tinea Corporis      Aug  4 2011  9:08AM     

 

                    Depressive Disorder    Sep 23 2011  8:47AM     

 

                    Contact Dermatitis    Sep 23 2011  8:47AM     

 

                    Anemia, Pernicious    Sep 23 2011  8:47AM     

 

                    B12 deficiency      Sep 23 2011  8:47AM     

 

                    B12 deficiency      Sep 27 2011  2:58PM     

 

                    B12 deficiency      Oct 20 2011  2:34PM     

 

                    Flu                 Dec  9 2011  3:16PM     

 

                    B12 deficiency      Dec  9 2011  3:17PM     

 

                    B12 deficiency      2012  4:52PM     

 

                    B12 deficiency      Feb 2012 11:10AM     

 

                    B12 deficiency      2012  3:37PM     

 

                    B12 deficiency      May  3 2012  4:10PM     

 

                    B12 deficiency      2012  2:54PM     

 

                    B12 deficiency      2012 11:23AM     

 

                    B12 deficiency      Aug  9 2012  2:08PM     

 

                    B12 deficiency      Sep  6 2012  4:36PM     

 

                    B12 deficiency      Oct 16 2012 10:23AM     

 

                    Flu                 Feb  4   3:11PM     

 

                    Bipolar disorder, unspecified    Feb  4   2:48PM     

 

                    Anemia, Pernicious    Feb  4   2:48PM     

 

                    B12 deficiency      Feb  4   2:48PM     

 

                    Extrapyramidal abnormal movement disorder    Feb  4   2:48PM     

 

                    B12 deficiency      Apr  3 2013 12:03PM     

 

                    Bipolar disorder, unspecified    May  7 2013  1:31PM     

 

                    Anemia, Pernicious    May  7 2013  1:31PM     

 

                    B12 deficiency      May  7 2013  1:31PM     

 

                    Extrapyramidal abnormal movement disorder    May  7 2013  1:31PM     

 

                    B12 deficiency      2013  3:42PM     

 

                    B12 deficiency      2013  1:31PM     

 

                    Hyperlipidemia      Aug  7 2013 10:37AM     

 

                    Vitamin D Deficiency    Aug  7 2013 10:37AM     

 

                    Bipolar disorder, unspecified    Aug  7 2013 10:37AM     

 

                    Anemia, Pernicious    Aug  7 2013 10:37AM     

 

                    B12 deficiency      Aug  7 2013 10:37AM     

 

                    B12 deficiency      Sep 25 2013 11:15AM     

 

                    B12 deficiency      Dec 11 2013  3:16PM     

 

                    B12 deficiency      Mar  6 2014  1:48PM     

 

                    B12 deficiency      May 21 2014  3:17PM     

 

                    Screening Examination for Breast Cancer    2014  3:23PM     

 

                    Periumbilical abdominal pain    2014  3:23PM     

 

                    B12 deficiency      Jul 10 2014  2:52PM     

 

                    Anemia, Vitamin B12 Deficiency    Aug 13 2014  4:50PM     

 

                    Bipolar disorder    Oct 16 2014 11:13AM     

 

                    Hyperlipidemia      Oct 16 2014 11:13AM     

 

                    Anemia, Pernicious    Oct 16 2014 11:13AM     

 

                    Peritoneal Neoplasm, Malignant    Oct 16 2014 11:13AM     

 

                    Screening breast examination    Oct 16 2014 11:13AM     

 

                    Weight loss         Oct 16 2014 11:13AM     

 

                    Anemia, Pernicious    Mar 23 2015  2:57PM     

 

                    B12 deficiency      Mar 23 2015  2:57PM     

 

                    Need for Prevnar vaccine    Mar 23 2015  2:57PM     

 

                    Bipolar disorder    Mar 23 2015  2:57PM     

 

                    Hyperlipidemia      Mar 23 2015  2:57PM     

 

                    Anemia, Pernicious    Mar 23 2015  2:57PM     

 

                    Peritoneal Neoplasm, Malignant    Mar 23 2015  2:57PM     

 

                    B12 deficiency      May  4 2015  4:48PM     

 

                    Hyperlipidemia      May 13 2015  9:56AM     

 

                    Anemia              May 13 2015  9:56AM     

 

                    Bipolar disorder    May 13 2015  9:56AM     

 

                    Bipolar disorder    May 14 2015  3:27PM     

 

                    Hyperlipidemia      May 14 2015  3:27PM     

 

                    Anemia, Pernicious    May 14 2015  3:27PM     

 

                    Peritoneal Neoplasm, Malignant    May 14 2015  3:27PM     

 

                    B12 deficiency      2015  2:20PM     

 

                    B12 deficiency      2015 11:34AM     

 

                    B12 deficiency      Aug 18 2015  9:06AM     

 

                    Tinea Corporis      Sep 18 2015  8:54AM     

 

                    B12 deficiency      Sep 18 2015  8:54AM     

 

                    B12 deficiency      2015 10:28AM     

 

                    Herpes zoster without complication    Dec  3 2015  9:52AM     

 

                    B12 deficiency      Dec 23 2015 11:21AM     

 

                    B12 deficiency      2016  4:51PM     

 

                    Vitamin B 12 deficiency    Mar 14 2016  5:35PM     

 

                    B12 deficiency      Mar 15 2016 12:14PM     

 

                    B12 deficiency      May  5 2016 11:30AM     

 

                    Edema               May  5 2016 11:30AM     

 

                    Dermatitis          May  5 2016 11:30AM     

 

                    Edema               May 17 2016  8:38AM     

 

                    Shortness of breath    May 17 2016  8:38AM     

 

                    Bilateral edema of lower extremity    2016  2:06PM     

 

                    B12 deficiency      2016  2:06PM     

 

                    B12 deficiency      2016 11:50AM     

 

                    B12 deficiency      2016 11:20AM     

 

                    Diarrhea            Aug  2 2016  3:13PM     

 

                    B12 deficiency      Aug 24 2016 11:10AM     

 

                    Encounter for screening mammogram for breast cancer    Aug 24 2016 11:44AM     

 

                    B12 deficiency      Sep 28 2016  2:35PM     

 

                    B12 deficiency      Dec 15 2016  2:02PM     

 

                    Dysuria             Dec 29 2016 12:14PM     

 

                    Hematuria           Fidencio  3 2017  1:33PM     

 

                    Constipation by delayed colonic transit    2017  1:52PM     

 

                    Ileus               2017  1:52PM     

 

                    UTI (urinary tract infection)    Fidencio 15 2017  3:39PM     

 

                    Acute cystitis with hematuria    2017 11:07AM     

 

                    B12 deficiency      2017 11:07AM     

 

                    B12 deficiency      2017 11:40AM     

 

                    B12 deficiency      2017  4:07PM     

 

                    Slurred speech      2017  3:07PM     

 

                    Vitamin B12 deficiency    2017  3:07PM     

 

                    Dysphagia, unspecified type    2017  3:07PM     

 

                    Hyperlipidemia      2017  3:07PM     

 

                    Dysuria             2017 12:01PM     

 

                    B12 deficiency      2017  2:08PM     

 

                    Dysuria             2017 10:58AM     

 

                    Hematuria           May 22 2017  1:36PM     

 

                    Depressive Disorder    May 22 2017  1:36PM     

 

                    Constipation        May 22 2017  1:36PM     

 

                    Fatigue             May 22 2017  1:36PM     

 

                    Urinary tract infection    May 30 2017  9:36AM     

 

                    Hypokalemia         May 30 2017 12:03PM     

 

                    Urinary tract infection    2017  4:36PM     







Payers







           Insurance Name    Company Name    Plan Name    Plan Number    Policy Number    Policy Group

 Number                                 Start Date

 

                    Medicare RHC Medicare RHC              [id number]              N/A

 

                          [employer]    [employer]                 [id number]

                                                    

 

                    Medicare Part A    Medicare - Lab/Xray              [id number]              2006

 

                    Medicare Part B    Medicare [place]              [id number]              2006

 

                          [employer]    [employer]                 50 percent

                                                    2009

 

                    [facility]    [employer]              [id number]              N/A

 

                    Medicare Part A    Medicare Part A              [id number]              N/A

 

                    Medicare Part A    Medicare Part A              [id number]              2006









History of Encounters







                    Visit Date          Visit Type          Provider

 

                    2017           Office visit        Bhupinder Louise DO

 

                    2017           Nurse visit         Bhupinder Louise DO

 

                    2017           Office visit         

 

                    2017           Office visit        Bhupinder Louise DO

 

                    2017           Nurse visit         Bhupinder Louise DO

 

                    2017           Office visit        Radha GREEN

 

                    1/15/2017           Office visit        Aj Tapia NP

 

                    2017            Office visit        Devin Masterson MD

 

                    2016          [facility]            Devin Masterson MD

 

                    12/15/2016          Nurse visit         Bhupinder Aspen DO

 

                    2016           Nurse visit         Bret Forte APRN

 

                    2016           Nurse visit         Bhupinder Aspen DO

 

                    2016            Office visit        Bhupinder Aspen DO

 

                    2016           Nurse visit         Bhupinder Aspen DO

 

                    2016           Office visit        Bret Forte APRN

 

                    2016            Office visit        Bhupinder Aspen DO

 

                    3/15/2016           Nurse visit         Bhupinder Aspen DO

 

                    2016            Nurse visit         Bhupinder Aspen DO

 

                    2015          Nurse visit         Bhupinder Aspen DO

 

                    12/3/2015           Office visit        Bhupinder Aspen DO

 

                    2015          Nurse visit         Bhupinder Aspen DO

 

                    2015           Office visit        Bhupinder Aspen DO

 

                    2015           Nurse visit         Bhupinder Aspen DO

 

                    2015            Nurse visit         Bhupinder Aspen DO

 

                    2015            Nurse visit         Bhupinder Aspen DO

 

                    2015           Office visit        Bhupinder Aspen DO

 

                    2015            Nurse visit         Bhupinder Aspen DO

 

                    3/23/2015           Office visit        Bhupinder Aspen DO

 

                    10/16/2014          Office visit        Bhupinder Aspen DO

 

                    2014           Nurse visit         Radha Ontiveros APRN

 

                    7/10/2014           Nurse visit         Bhupinder Aspen DO

 

                    2014           Office visit        Bhupinder Aspen DO

 

                    2014           Nurse visit         Bhupinder Aspen DO

 

                    3/6/2014            Nurse visit         Bhupinder Aspen DO

 

                    2014            [facility]            EARNEST Lopez MD

 

                    2013          Nurse visit         Bhupinder Aspen DO

 

                    2013           Nurse visit         Bhupinder Aspen DO

 

                    2013            Office visit        Bhupinder Aspen DO

 

                    2013            Nurse visit         Bhupinder Aspen DO

 

                    2013            Nurse visit         Bhupinder Aspen DO

 

                    2013            Office visit        Bhupinder Aspen DO

 

                    4/3/2013            Nurse visit         Bhupinder Aspen DO

 

                    2013            Office visit        Bhupinder Aspen DO

 

                    10/16/2012          Nurse visit         Bhupinder Aspen DO

 

                    2012            Nurse visit         Bhupinder Aspen DO

 

                    2012            Voided              Bhupinder Aspen DO

 

                    2012            Nurse visit         Bhupinder Aspen DO

 

                    2012            Nurse visit         Bhupinder Aspen DO

 

                    2012           Nurse visit         Bhupinder Aspen DO

 

                    5/3/2012            Nurse visit         Bhupinder Aspen DO

 

                    2012           Nurse visit         Bhupinder Aspen DO

 

                    2012           Nurse visit         Bhupinder Aspen DO

 

                    2012           Nurse visit         Bhupinder Aspen DO

 

                    2011           Nurse visit         Bhupinder Aspen DO

 

                    10/20/2011          Nurse visit         Bhupinder Aspen DO

 

                    2011           Office visit        Bhupinder Aspen DO

 

                    2011           Nurse visit         Rdaha GREEN

 

                    2011            Office visit        Bhupinder Aspen DO

 

                    2011           Nurse visit         Bhupinder Aspen DO

 

                    2011            Office visit        Bhupinder Aspen DO

 

                    2011           Office visit        Bhupinder Aspen DO

 

                    5/10/2011           Office visit        Bhupinder Aspen DO

 

                    2011           Office visit        Bhupinder Aspen DO

 

                    4/15/2011           Office visit        Devin Angel DO

 

                    2011           Office visit        Devin Angel DO

 

                    10/15/2010          Office visit        Devin Angel DO

 

                    2010           Office visit        Devin Angel DO

 

                    2010            Office visit        Devin Angel DO

 

                    2010           Office visit        Devin Angel DO

 

                    2010            Office visit        Devin Angel DO

 

                    3/8/2010            Office visit        Devin Masterson MD

 

                    2010            Surgery             Devin Masterson MD

 

                    2010            Office visit        Devin Angel DO

 

                    2010           Surgery             Devin Masterson MD

 

                    2010           Hospital            Devin Masterson MD

 

                    2010           [facility]            Devin Masterson MD

 

                    10/22/2009          Office visit        Devin Angel DO

## 2019-11-12 ENCOUNTER — HOSPITAL ENCOUNTER (OUTPATIENT)
Dept: HOSPITAL 75 - PREOP | Age: 69
Discharge: HOME | End: 2019-11-12
Attending: SURGERY
Payer: MEDICARE

## 2019-11-12 VITALS — WEIGHT: 157.41 LBS | HEIGHT: 66.93 IN | BODY MASS INDEX: 24.71 KG/M2

## 2019-11-12 DIAGNOSIS — Z01.818: Primary | ICD-10-CM

## 2019-11-18 ENCOUNTER — HOSPITAL ENCOUNTER (OUTPATIENT)
Dept: HOSPITAL 75 - ENDO | Age: 69
Discharge: HOME | End: 2019-11-18
Attending: SURGERY
Payer: MEDICARE

## 2019-11-18 VITALS — SYSTOLIC BLOOD PRESSURE: 97 MMHG | DIASTOLIC BLOOD PRESSURE: 53 MMHG

## 2019-11-18 VITALS — DIASTOLIC BLOOD PRESSURE: 59 MMHG | SYSTOLIC BLOOD PRESSURE: 111 MMHG

## 2019-11-18 VITALS — DIASTOLIC BLOOD PRESSURE: 97 MMHG | SYSTOLIC BLOOD PRESSURE: 121 MMHG

## 2019-11-18 VITALS — HEIGHT: 66.93 IN | WEIGHT: 157.41 LBS | BODY MASS INDEX: 24.71 KG/M2

## 2019-11-18 VITALS — DIASTOLIC BLOOD PRESSURE: 55 MMHG | SYSTOLIC BLOOD PRESSURE: 127 MMHG

## 2019-11-18 VITALS — SYSTOLIC BLOOD PRESSURE: 121 MMHG | DIASTOLIC BLOOD PRESSURE: 97 MMHG

## 2019-11-18 VITALS — DIASTOLIC BLOOD PRESSURE: 59 MMHG | SYSTOLIC BLOOD PRESSURE: 114 MMHG

## 2019-11-18 DIAGNOSIS — Z87.891: ICD-10-CM

## 2019-11-18 DIAGNOSIS — F32.9: ICD-10-CM

## 2019-11-18 DIAGNOSIS — K94.09: ICD-10-CM

## 2019-11-18 DIAGNOSIS — E78.5: ICD-10-CM

## 2019-11-18 DIAGNOSIS — Z85.038: ICD-10-CM

## 2019-11-18 DIAGNOSIS — K64.8: ICD-10-CM

## 2019-11-18 DIAGNOSIS — K57.30: ICD-10-CM

## 2019-11-18 DIAGNOSIS — Z98.51: ICD-10-CM

## 2019-11-18 DIAGNOSIS — Z79.82: ICD-10-CM

## 2019-11-18 DIAGNOSIS — N18.3: ICD-10-CM

## 2019-11-18 DIAGNOSIS — Z12.11: Primary | ICD-10-CM

## 2019-11-18 DIAGNOSIS — Z80.9: ICD-10-CM

## 2019-11-18 DIAGNOSIS — M10.9: ICD-10-CM

## 2019-11-18 DIAGNOSIS — K21.9: ICD-10-CM

## 2019-11-18 DIAGNOSIS — G30.9: ICD-10-CM

## 2019-11-18 DIAGNOSIS — D12.3: ICD-10-CM

## 2019-11-18 DIAGNOSIS — D12.2: ICD-10-CM

## 2019-11-18 PROCEDURE — 88305 TISSUE EXAM BY PATHOLOGIST: CPT

## 2019-11-18 NOTE — OPERATIVE REPORT
DATE OF SERVICE:  11/18/2019



PREOPERATIVE DIAGNOSES:

History of polyps and needs a screening colonoscopy.



POSTOPERATIVE DIAGNOSES:

Multiple polyps, diverticula and some very small internal hemorrhoids.



PROCEDURE:

Colonoscopy with snare polypectomy.



SURGEON:

Goldy Jackson DO



FIRST ASSISTANT:

Stanley Turcios, MS3



SPECIMEN:

Three polyps in the ascending colon, 2 polyps in transverse colon and 2 polyps

near the stoma.



BLOOD LOSS:

Scant.



FLUIDS:

Per anesthesia.



POSTOPERATIVE CONDITION:

Stable.



INDICATION FOR PROCEDURE:

The patient is a 69-year-old female who has a colonoscopy, history of colon

cancer and has not had a colonoscopy in a long time.  She had a recent biopsy of

a polyp on the stomal opening and it came back as an adenoma, so she needed

colonoscopy.



FINDINGS:

The patient had multiple polyps seen throughout the colon.  She also had some

couple of diverticula and some very-very small internal hemorrhoids seen in the

rectal stump.



PROCEDURE NOTE:

After informed consent was obtained, the patient was brought to the endoscopy

suite, placed in bed in left lateral decubitus position.  She was administered

IV sedation by the CRNA who then monitored her vitals the entire time, heart

rate, blood pressure and pulse ox.  First started and I actually used a

gastroscope because the stoma looks a small, started with the rectal stump, did

not see any obvious, could see the end and then placed the patient supine and

then started the colonoscopy through the stoma, pushed all the way into about

100 cm, able to get to the cecum, took a picture of appendiceal orifice, noted

the ileocecal valve and then slowly withdrew the scope insufflating to look

circumferentially walls, looking at the cecum and just outside the cecum saw 3

polyps, did a snare polypectomy of these, but one of them was so large.  We had

to pull the scope all the way out because we grasped the polyp with a basket and

pulled it all the way out.  Then, pushed the scope back in and then slowly

withdrew the scope insufflating to look circumferentially at the walls up the

ascending colon to the hepatic flexure into the transverse colon including

transverse colon  by a pretty decent distance, saw 2 polyps.  Again,

did snare polypectomies of these and then continued down to probably the splenic

flexure and then near the stoma saw 2 more polyps and did a snare polypectomy of

these and take one in pieces.  It was so large, able to get all these pieces of

the 2 polyps suctioned up and then pulled the scope out.  The patient tolerated

the procedure, recovered in endoscopy suite.





Job ID: [id number]

DocumentID: [id number]

Dictated Date:  11/18/2019 10:19:58

Transcription Date: 11/18/2019 13:51:07

Dictated By: GOLDY JACKSON DO

## 2019-11-18 NOTE — ENDOSCOPY DISCHARGE INSTRUCT
Endo Procedure/Findings


Findings


1.:  Polyp


2.:  Diverticulosis


3.:  Internal Hemorrhoids





Discharge Instructions


-


Activity: You might feel a little sleepy until tomorrow.  This is due to the 

medicine you received to relax you.





Until tomorrow, you should:  


   NOT drive a car, operate machinery or power tools.


   NOT drink any alcoholic beverages.


   NOT make any important decisions or sign importortant papers.





Do not return to work until tomorrow, unless otherwise instructed. Resume 

previous activities tomorrow.





Diet: Start by taking liquids.  If you tolerate liquids, advance to solid food.





make an appointment for one week


1.:  Colonoscopy in 1 year





Notify Physician


-


If you experience excessive bleeding, unusual abdominal pain, fever, or chest 

pain, contact your doctor immediately.











MOSES BECERRA DO               Nov 18, 2019 10:25


POS

## 2019-11-18 NOTE — ANESTHESIA-GENERAL POST-OP
MAC


Patient Condition


Mental Status/LOC:  Same as Preop


Cardiovascular:  Satisfactory


Nausea/Vomiting:  Absent


Respiratory:  Satisfactory


Pain:  Controlled


Complications:  Absent





Post Op Complications


Complications


None





Follow Up Care/Instructions


Patient Instructions


None needed.





Anesthesiology Discharge Order


Discharge Order


Patient is doing well, no complaints, stable vital signs, no apparent adverse 

anesthesia problems.   


No complications reported per nursing.











JOLLY GALLO CRNA              Nov 18, 2019 11:54


POS

## 2019-11-18 NOTE — PROGRESS NOTE-POST OPERATIVE
Post-Operative Progess Note


Surgeon (s)/Assistant (s)


Surgeon


MOSES BECERRA DO


Assistant:  GRAHAM RenaeII





Pre-Operative Diagnosis


Colon polyps





Post-Operative Diagnosis





Colon polyps


Diverticula


Int hemorrhoids





Procedure & Operative Findings


Date of Procedure


11/18/19


Procedure Performed/Findings


Colon with snare


Anesthesia Type


IV sedation by CRNA





Estimated Blood Loss


Estimated blood loss (mL):  scant





Specimens/Packing


Specimens Removed


asc colon x3


transverse colon x 2


descending colon x 2











MOSES BECERRA DO               Nov 18, 2019 10:23


POS

## 2021-01-25 ENCOUNTER — HOSPITAL ENCOUNTER (OUTPATIENT)
Dept: HOSPITAL 75 - ONC | Age: 71
End: 2021-01-25
Attending: INTERNAL MEDICINE
Payer: MEDICARE

## 2021-01-25 DIAGNOSIS — Z92.21: ICD-10-CM

## 2021-01-25 DIAGNOSIS — Z90.710: ICD-10-CM

## 2021-01-25 DIAGNOSIS — C48.2: Primary | ICD-10-CM

## 2021-01-25 DIAGNOSIS — Z92.3: ICD-10-CM

## 2021-01-25 DIAGNOSIS — F03.90: ICD-10-CM

## 2021-01-25 DIAGNOSIS — E78.00: ICD-10-CM

## 2021-01-25 DIAGNOSIS — N18.4: ICD-10-CM

## 2021-01-25 DIAGNOSIS — Z98.890: ICD-10-CM

## 2021-01-25 LAB
ALBUMIN SERPL-MCNC: 3.8 GM/DL (ref 3.2–4.5)
ALP SERPL-CCNC: 103 U/L (ref 40–136)
ALT SERPL-CCNC: 11 U/L (ref 0–55)
BASOPHILS # BLD AUTO: 0 10^3/UL (ref 0–0.1)
BASOPHILS NFR BLD AUTO: 0 % (ref 0–10)
BILIRUB SERPL-MCNC: 0.2 MG/DL (ref 0.1–1)
BUN/CREAT SERPL: 12
CALCIUM SERPL-MCNC: 8.9 MG/DL (ref 8.5–10.1)
CHLORIDE SERPL-SCNC: 103 MMOL/L (ref 98–107)
CO2 SERPL-SCNC: 20 MMOL/L (ref 21–32)
CREAT SERPL-MCNC: 1.75 MG/DL (ref 0.6–1.3)
EOSINOPHIL # BLD AUTO: 0.2 10^3/UL (ref 0–0.3)
EOSINOPHIL NFR BLD AUTO: 2 % (ref 0–10)
GFR SERPLBLD BASED ON 1.73 SQ M-ARVRAT: 29 ML/MIN
GLUCOSE SERPL-MCNC: 103 MG/DL (ref 70–105)
HCT VFR BLD CALC: 37 % (ref 35–52)
HGB BLD-MCNC: 12.2 G/DL (ref 11.5–16)
LYMPHOCYTES # BLD AUTO: 1.8 10^3/UL (ref 1–4)
LYMPHOCYTES NFR BLD AUTO: 23 % (ref 12–44)
MANUAL DIFFERENTIAL PERFORMED BLD QL: NO
MCH RBC QN AUTO: 34 PG (ref 25–34)
MCHC RBC AUTO-ENTMCNC: 33 G/DL (ref 32–36)
MCV RBC AUTO: 102 FL (ref 80–99)
MONOCYTES # BLD AUTO: 0.7 10^3/UL (ref 0–1)
MONOCYTES NFR BLD AUTO: 9 % (ref 0–12)
NEUTROPHILS # BLD AUTO: 5.1 10^3/UL (ref 1.8–7.8)
NEUTROPHILS NFR BLD AUTO: 66 % (ref 42–75)
PLATELET # BLD: 265 10^3/UL (ref 130–400)
PMV BLD AUTO: 9.4 FL (ref 9–12.2)
POTASSIUM SERPL-SCNC: 3.1 MMOL/L (ref 3.6–5)
PROT SERPL-MCNC: 7.2 GM/DL (ref 6.4–8.2)
SODIUM SERPL-SCNC: 138 MMOL/L (ref 135–145)
WBC # BLD AUTO: 7.8 10^3/UL (ref 4.3–11)

## 2021-01-25 PROCEDURE — 99213 OFFICE O/P EST LOW 20 MIN: CPT

## 2021-01-25 PROCEDURE — 85025 COMPLETE CBC W/AUTO DIFF WBC: CPT

## 2021-01-25 PROCEDURE — 80053 COMPREHEN METABOLIC PANEL: CPT

## 2021-01-25 PROCEDURE — 86304 IMMUNOASSAY TUMOR CA 125: CPT

## 2022-08-03 NOTE — XMS REPORT
MU2 Ambulatory Summary

                             Created on: 2017



Pauline Gan

External Reference #: [id number]

: 1950

Sex: Female



Demographics







                          Address                   [address]

[place]  [id number]

 

                          Home Phone                [phone]

 

                          Preferred Language        English

 

                          Marital Status            Legally 

 

                          Mandaen Affiliation     Unknown

 

                          Race                      White

 

                          Ethnic Group              Not  or 





Author







                          Author                    Bhupinder Louise

 

                          [facility]

 

                          Address                   [address]

[place]  [id number]



 

                          Phone                     [phone]







Care Team Providers







                    Care Team Member Name    Role                Phone

 

                    Bhupinder Louise    PCP                 Unavailable

 

                    Bhupinder Louise    PreferredProvider    Unavailable







Allergies and Adverse Reactions







                    Name                Reaction            Notes

 

                    NO KNOWN DRUG ALLERGIES                         







Plan of Treatment







             Planned Activity    Comments     Planned Date    Planned Time    Plan/Goal

 

             Injection,Subcutaneous/Intramuscul, [facility]                 2017    12:00 AM      

 

             [facility]                       2017    12:00 AM      







Medications







                                        Active 

 

             Name         Start Date    Estimated Completion Date    SIG          Comments

 

                Latuda 20 mg oral tablet                                    take 1 tablet (20 mg) by oral route once daily with

 food (at least 350 calories)            

 

             pravastatin 40 mg oral tablet    3/30/2015                 TAKE 1 TABLET BY MOUTH DAILY     

 

                Namenda XR 28 mg oral capsule,sprinkle,ER 24hr    2015                       take 1 capsule (28

 mg) by oral route once daily            

 

                Namenda XR 28 mg oral capsule,sprinkle,ER 24hr    2016                       take 1 capsule (28

 mg) by oral route once daily            

 

                potassium chloride 10 mEq oral tablet extended release    2016                       take 1 tablet

 (10 meq) by oral route once daily       

 

             pravastatin 40 mg oral tablet    2016                 TAKE 1 TABLET BY MOUTH DAILY     

 

                Vitamin B-12 1,000 mcg/mL injection solution    2016                       inject 1 milliliter 

(1,000 mcg) by intramuscular route once a month     

 

                potassium chloride 10 mEq oral tablet extended release    2016                      take 1 tablet

 (10 meq) by oral route once daily       

 

                Namenda XR 28 mg oral capsule,sprinkle,ER 24hr    2016                      TAKE 1 CAPSULE BY

 MOUTH EVERY DAY                         

 

                furosemide 40 mg oral tablet    2016                      take 1 tablet (40 mg) by oral route

 once daily                              

 

                mirtazapine 45 mg oral tablet                                    take 1 tablet (45 mg) by oral route once daily

 before bedtime                          

 

             Fish Oil 300-1,000 mg oral capsule                              take 1 capsule by oral route daily     

 

             Vitamin D3 1,000 unit oral tablet                              take 1 tablet by oral route daily     

 

                Calcium 600 600 mg calcium (1,500 mg) oral tablet                                    take 1 tablet by oral route

 daily                                   

 

                Aspirin Low Dose 81 mg oral tablet,delayed release (DR/EC)                                    take 1 tablet 

(81 mg) by oral route once daily         

 

                metoclopramide HCl 10 mg oral tablet    2017                       take 1 tablet by oral route 

2 times a day for 50 days                

 

             furosemide 40 mg oral tablet    2017                 TAKE 1 TABLET BY MOUTH DAILY     

 

                Namenda XR 28 mg oral capsule,sprinkle,ER 24hr    2017                       TAKE 1 CAPSULE BY 

MOUTH EVERY DAY                          

 

                Linzess 72 mcg oral capsule    2017                       take 1 capsule (72 mcg) by oral route

 once daily on an empty stomach at least 30 minutes before 1st meal of the day     



 

             pravastatin 40 mg oral tablet    2017                 TAKE 1 TABLET BY MOUTH DAILY     

 

                potassium chloride 10 mEq oral tablet extended release    2017                       TAKE 2 TABLETs

 BY MOUTH twice DAILY for one week       









                                         

 

             Name         Start Date    Expiration Date    SIG          Comments

 

             Reglan 10mg    3/29/2010    2010    one ac and hs     

 

                Keflex 500 mg oral capsule    2010       10/1/2010       take 1 capsule (500 mg) by oral

 route every 6 hours for 10 days         

 

                Bactrim -160 mg oral tablet    2011       take 1 tablet by oral route

 every 12 hours for 7 days               

 

                triamcinolone acetonide 0.1 % topical cream    2011      apply a thin

 layer to the affected area(s) by topical route 2 times per day     

 

                sertraline 100 mg oral tablet    4/10/2012       5/10/2012       take 1.5 tablets by oral route

 daily for 30 days                       

 

                ergocalciferol (vitamin D2) 50,000 unit oral capsule    4/15/2013       2013       TAKE

 ONE CAPSULE BY MOUTH ONCE A WEEK        

 

                CYANOCOBALAM 1000MCGINJ 1000 milliliter    2013       INJECT 1ML INTRAMUSCULAR

 ONCE A MONTH                            

 

                pravastatin 40 mg oral tablet    3/25/2014       3/20/2015       TAKE ONE TABLET BY MOUTH EVERY

 DAY                                     

 

                          Zostavax (PF) 19,400 unit/0.65 mL subcutaneous suspension for reconstitution    3/23/2015

                    3/24/2015           inject 0.65 milliliter by subcutaneous route once     

 

                famciclovir 500 mg oral tablet    12/3/2015       12/10/2015      take 1 tablet (500 mg) by

 oral route every 8 hours for 7 days     

 

                furosemide 40 mg oral tablet    2016      take 1 tablet (40 mg) by oral

 route once daily                        

 

                Cipro 500 mg oral tablet    2016       take 1 tablet (500 mg) by oral route

 2 times per day for 5 days              

 

                Bactrim -160 mg oral tablet    2016        take 1 tablet by oral route

 every 12 hours for 7 days               

 

                metoclopramide HCl 10 mg oral tablet    2017       take 1 tablet by oral

 route 2 times a day for 50 days         

 

                Macrobid 100 mg oral capsule    2017       take 1 capsule (100 mg) by oral

 route 2 times per day with food for 7 days     

 

                Augmentin 875-125 mg oral tablet    2017       take 1 tablet by oral route

 every 12 hours for 7 days               

 

                amoxicillin 500 mg oral tablet    2017       take 1 tablet (500 mg) by oral

 route every 12 hours for 7 days         

 

                cefuroxime axetil 500 mg oral tablet    2017       take 1 tablet (500 mg)

 by oral route 2 times per day for 7 days     

 

                ciprofloxacin HCl 500 mg oral tablet    2017       take 1 tablet (500 mg)

 by oral route every 12 hours for 5 days     









                                        Discontinued 

 

             Name         Start Date    Discontinued Date    SIG          Comments

 

                Tylenol 325 mg oral tablet                    2013        take 1 - 2 tablets (325 -650 mg) by oral

 route every 4-6 hours as needed         

 

                Calcium 600 + D(3) 600 mg(1,500mg) -400 unit oral tablet                    2011       take 1 tablet

 by oral route 2 times a day            no longer taking

 

                Vitamin B-12 1,000 mcg oral tablet extended release    2010       take 1

 tablet by oral route daily             no longer taking

 

                Antifungal (clotrimazole) 1 % topical cream    2010       apply to the 

affected and surrounding areas of skin by topical route 2 times per day morning 
and evening                              

 

                sertraline 100 mg oral tablet    5/10/2011       2011       take 2 tablets (200 mg) by 

oral route once daily                   discontinued by Dr. Serrano

 

                mirtazapine 15 mg oral tablet                    2011        take 1 tablet (15 mg) by oral route 

once daily before bedtime               Dr. Serrano

 

                mirtazapine 15 mg oral tablet                    2011        take 1 tablet (15 mg) by oral route 

once daily before bedtime               dc'd by Dr. Serrano

 

                Pristiq 50 mg oral tablet extended release 24 hr                    2013        take 1 tablet (50

 mg) by oral route once daily           Dr. Serrano

 

                Pristiq 50 mg oral tablet extended release 24 hr                    2013        take 1 tablet (50

 mg) by oral route once daily           dose updated

 

                Vitamin B-12 1,000 mcg/mL injection solution    2011        inject 1 milliliter

 (1,000 mcg) by intramuscular route once a month    on list already

 

                    syringe with needle 1 mL 25 gauge x 1" miscellaneous syringe    2011

                          use for injection once a month     

 

                clotrimazole 1 % topical cream    2011        apply to the affected and surrounding

 areas of skin by topical route 2 times per day in the morning and evening     

 

                Vitamin D2 50,000 unit oral capsule    2011        take 1 capsule (50,000

 unit) by oral route once weekly        generic on list

 

                Pravachol 40 mg oral tablet    2012        take 1 tablet (40 mg) by oral 

route once daily for 90 days            generic on list

 

                lithium carbonate 300 mg oral capsule    2012        take 1 capsule by oral

 route daily                            dose updated

 

                Pristiq 100 mg oral tablet extended release 24 hr                    4/10/2012       take 1 and 1/2 

tablet (150 mg) by oral route once daily    [facility]ental [facility] provider

 

                Pristiq 100 mg oral tablet extended release 24 hr                    4/10/2012       take 1 and 1/2 

tablet (150 mg) by oral route once daily    Discontinued by Dr Efrain Knight at [facility]

 

                hydroxyzine HCl 50 mg oral tablet    10/16/2014      2015       take 1 tablet (50 mg) 

by oral route at bedtime                 

 

                lithium carbonate 300 mg oral capsule    2015       take 1 capsule (300

 mg) by oral route 2 for 30 days         

 

                fluconazole 100 mg oral tablet    2015       12/3/2015       take 1 tablet (100 mg) by 

oral route once a week                   

 

                ketoconazole 2 % topical cream    2015       12/3/2015       apply to the affected area(s)

 by topical route 2 times per day        

 

                prednisone 10 mg oral tablet    12/3/2015       2016        take 2 tablets (20 mg) by oral

 route once daily for 4 days 1 tablet daily for 4 days 0.5 tablet daily for 4 
days                                     

 

                triamcinolone acetonide 0.1 % topical cream    2016       apply a thin layer

 to the affected area(s) by topical route 2 times per day     

 

                Cipro 500 mg oral tablet    1/15/2017       2017       take 1 tablet (500 mg) by oral route

 every 12 hours for 10 days              







Problem List







                    Description         Status              Onset

 

                    Artificial opening status; colostomy    Active               

 

                    Bipolar disorder, unspecified    Active               

 

                    Hyperlipidemia      Active               

 

                    Peritoneal Neoplasm, Malignant    Active               

 

                    Anemia, Pernicious    Active               

 

                    Arthritis unspecified    Active               

 

                    B12 deficiency      Active               







Vital Signs







      Date    Time    BP-Sys(mm[Hg]    BP-Lynn(mm[Hg])    HR(bpm)    RR(rpm)    Temp    WT    HT    HC    BMI

                    BSA                 BMI Percentile      O2 Sat(%)

 

        2017    1:34:00 PM    118 mmHg    62 mmHg    122 bpm    18 rpm    97.8 F    161.375 lbs    

69 in                     23.83 kg/m2    1.89 m2                   96 %

 

        2017    3:05:00 PM    134 mmHg    70 mmHg    70 bpm    20 rpm    97.4 F    181 lbs    69 in

                          26.7288 kg/m    1.9992 m                 98 %

 

        2017    11:07:00 AM    124 mmHg    64 mmHg    62 bpm    17 rpm    98.2 F    181.2 lbs    69

 in                       26.76 kg/m2    2.00 m2                   98 %

 

        1/15/2017    3:34:00 PM    148 mmHg    89 mmHg    69 bpm    20 rpm    98.2 F    179 lbs    69 in

                          26.4334 kg/m    1.9882 m                 98 %

 

       2017    1:51:00 PM    160 mmHg    90 mmHg    100 bpm    20 rpm    96.5 F    179 lbs             

                                                                98 %

 

       2016    3:11:00 PM    134 mmHg    76 mmHg    80 bpm    20 rpm    98 F    163 lbs    69 in     

                24.0706 kg/m    1.8972 m                      98 %

 

        2016    2:04:00 PM    142 mmHg    86 mmHg    68 bpm    16 rpm    98.5 F    166 lbs    63 in

                          29.41 kg/m2    1.83 m2                   100 %

 

        2016    11:27:00 AM    148 mmHg    78 mmHg    90 bpm    20 rpm    98.2 F    153 lbs    69 in

                          22.5939 kg/m    1.8381 m                 96 %

 

        12/3/2015    9:50:00 AM    132 mmHg    70 mmHg    62 bpm    16 rpm    97.9 F    145 lbs    69 in

                          21.41 kg/m2    1.79 m2                   100 %

 

        2015    8:52:00 AM    132 mmHg    68 mmHg    52 bpm    20 rpm    97.8 F    141 lbs    69 in

                          20.8218 kg/m    1.7645 m                 100 %

 

        2015    3:25:00 PM    120 mmHg    62 mmHg    72 bpm    16 rpm    98.1 F    136 lbs    69 in

                          20.08 kg/m2    1.73 m2                   98 %

 

       3/23/2015    2:55:00 PM    130 mmHg    76 mmHg    68 bpm    18 rpm    97 F    140 lbs    69 in    

                20.6742 kg/m    1.7583 m                      98 %

 

        10/16/2014    11:11:00 AM    120 mmHg    66 mmHg    77 bpm    20 rpm    98 F    130 lbs    69 in

                          19.20 kg/m2    1.69 m2                   100 %

 

        2014    3:21:00 PM    130 mmHg    66 mmHg    63 bpm    18 rpm    97.2 F    160 lbs    69 in

                          23.6276 kg/m    1.8797 m                 99 %

 

        2013    10:35:00 AM    132 mmHg    70 mmHg    66 bpm    20 rpm    98.1 F    157 lbs    69 in

                          23.18 kg/m2    1.86 m2                    

 

        2013    1:29:00 PM    132 mmHg    70 mmHg    76 bpm    18 rpm    98.2 F    166 lbs    69 in 

                          24.5137 kg/m    1.9146 m                  

 

       2013    2:46:00 PM    128 mmHg    70 mmHg    76 bpm    16 rpm    98 F    160 lbs    69 in     

                23.63 kg/m2     1.88 m2                          

 

        2011    8:49:00 AM    128 mmHg    78 mmHg    70 bpm    18 rpm    97.9 F    164 lbs    69 in

                          24.2183 kg/m    1.903 m                  

 

     2011    1:31:00 PM    132 mmHg    68 mmHg    84 bpm         97 F    167 lbs                        

                                         

 

        2011    9:09:00 AM    128 mmHg    70 mmHg    72 bpm    18 rpm    98.2 F    163 lbs    64 in 

                          27.9786 kg/m    1.8272 m                  

 

       2011    10:01:00 AM    132 mmHg    70 mmHg    72 bpm    18 rpm    98.2 F    154 lbs             

                                                                 

 

       2011    2:47:00 PM    128 mmHg    70 mmHg    72 bpm    18 rpm    97.8 F    156 lbs             

                                                                 

 

       5/10/2011    3:16:00 PM    144 mmHg    80 mmHg    72 bpm    18 rpm    98.2 F    158 lbs             

                                                                 

 

        2011    10:11:00 AM    132 mmHg    70 mmHg    70 bpm    18 rpm    98.2 F    168 lbs    69 in

                          24.809 kg/m    1.9261 m                  

 

        4/15/2011    10:52:00 AM    110 mmHg    60 mmHg    75 bpm    16 rpm    97.5 F    172.375 lbs    

69 in                     25.46 kg/m2    1.95 m2                   100 %

 

        2011    11:43:00 AM    120 mmHg    82 mmHg    75 bpm    16 rpm    97.2 F    178.5 lbs    69

 in                       26.3596 kg/m    1.9854 m                 100 %

 

        10/15/2010    1:32:00 PM    120 mmHg    70 mmHg    80 bpm    18 rpm    96.6 F    177 lbs    69 in

                          26.14 kg/m2    1.98 m2                   100 %

 

        2010    3:50:00 PM    168 mmHg    100 mmHg    82 bpm    18 rpm    97.8 F    177.5 lbs    69

 in                       26.2119 kg/m    1.9798 m                 97 %

 

        2010    1:21:00 PM    140 mmHg    80 mmHg    59 bpm    16 rpm    97.6 F    173.25 lbs    69 

in                        25.58 kg/m2    1.96 m2                   100 %

 

        2010    3:02:00 PM    140 mmHg    80 mmHg    61 bpm    16 rpm    97.6 F    173.125 lbs    69

 in                       25.5658 kg/m    1.9553 m                 99 %

 

        2010    1:23:00 PM    130 mmHg    80 mmHg    66 bpm    16 rpm    96.8 F    173 lbs    69 in 

                          25.55 kg/m2    1.95 m2                   100 %

 

        2010    12:58:00 PM    130 mmHg    88 mmHg    75 bpm    16 rpm    98.4 F    172.25 lbs    69

 in                       25.4366 kg/m    1.9503 m                 100 %







Social History







                    Name                Description         Comments

 

                    denies alcohol use                         

 

                    denies smoking                           

 

                    Denies illicit substance abuse                         

 

                    retired                                 direct care

 

                    Single                                   

 

                    Exercises regularly                         

 

                    Attended some college                         

 

                    Tobacco             Never smoker         







History of Procedures







                    Date Ordered        Description         Order Status

 

                    2010 12:00 AM    COMPREHEN METABOLIC PANEL    Reviewed

 

                    2010 12:00 AM    COMPLETE CBC W/AUTO DIFF WBC    Reviewed

 

                    2010 12:00 AM    LIPID PANEL         Reviewed

 

                          2015 12:00 AM        B12 Injection, Up to 1000 Mcg NDC#9706-8218-30 [facility] 

                                        Reviewed

 

                    2011 12:00 AM    MAMMOGRAM SCREENING    Reviewed

 

                    2011 12:00 AM    CYTOPATH C/V THIN LAYER    Reviewed

 

                    2011 12:00 AM    B12 Injection 1 cc NDC#[address]-88    Reviewed

 

                    2015 12:00 AM    THER/PROPH/DIAG INJ SC/IM    Reviewed

 

                    2015 12:00 AM    B12 Injection, Up to 1000 Mcg NDC#7276-9551-14    Reviewed

 

                    2011 12:00 AM    THER/PROPH/DIAG INJ SC/IM    Reviewed

 

                    2011 12:00 AM    B12 Injection([place]) Ndc#3322-7126-38-    Reviewed

 

                    2015 12:00 AM    THER/PROPH/DIAG INJ SC/IM    Reviewed

 

                    2015 12:00 AM    B12 Injection, Up to 1000 Mcg NDC#6199-6015-33    Reviewed

 

                    10/20/2011 12:00 AM    THER/PROPH/DIAG INJ SC/IM    Reviewed

 

                    10/20/2011 12:00 AM    B12 Injection([place]) Ndc#4844-8002-39-    Reviewed

 

                    2016 12:00 AM    THER/PROPH/DIAG INJ SC/IM    Reviewed

 

                    2016 12:00 AM    B12 Injection, Up to 1000 Mcg NDC#3637-5061-22    Reviewed

 

                    3/14/2016 12:00 AM    VITAMIN B-12        Reviewed

 

                    3/15/2016 12:00 AM    THER/PROPH/DIAG INJ SC/IM    Reviewed

 

                    3/15/2016 12:00 AM    B12 Injection, Up to 1000 Mcg NDC#1765-0715-29    Reviewed

 

                    2011 12:00 AM    ***Immunization administration, Medicare flu    Reviewed

 

                    2011 12:00 AM    Fluzone ** MEDICARE Only **    Reviewed

 

                    2011 12:00 AM    THER/PROPH/DIAG INJ SC/IM    Reviewed

 

                    2011 12:00 AM    B12 Injection ([employer] [facility]) Ndc#5040-8087-65    Reviewed

 

                    2016 12:00 AM    B12 Injection, Up to 1000 Mcg NDC#3797-2902-37 [facility]    

Reviewed

 

                    2016 12:00 AM    TTE W/DOPPLER COMPLETE    Reviewed

 

                    2016 12:00 AM    EXTREMITY STUDY     Reviewed

 

                          2016 12:00 AM        B12 Injection, Up to 1000 Mcg NDC#0581-9659-09 [facility] 

                                        Reviewed

 

                    2016 12:00 AM    THER/PROPH/DIAG INJ SC/IM    Reviewed

 

                    2016 12:00 AM    B12 Injection, Up to 1000 Mcg NDC#6939-6004-63    Reviewed

 

                    2016 12:00 AM    THER/PROPH/DIAG INJ SC/IM    Reviewed

 

                    2012 12:00 AM    B12 Injection([place]) Ndc#9155-6967-76-    Reviewed

 

                    2016 12:00 AM    B12 Injection, Up to 1000 Mcg NDC#3136-8292-03    Reviewed

 

                    2016 12:00 AM    THER/PROPH/DIAG INJ SC/IM    Reviewed

 

                    2012 12:00 AM    THER/PROPH/DIAG INJ SC/IM    Reviewed

 

                    2012 12:00 AM    B12 Injection ([employer]) Ndc#9235-9670-87    Reviewed

 

                    2016 12:00 AM    THER/PROPH/DIAG INJ SC/IM    Reviewed

 

                    2016 12:00 AM    B12 Injection, Up to 1000 Mcg NDC#0537-0764-22    Reviewed

 

                    2016 12:00 AM    B12 Injection, Up to 1000 Mcg NDC#5176-6665-02    Reviewed

 

                    2016 12:00 AM    THER/PROPH/DIAG INJ SC/IM    Reviewed

 

                    2012 12:00 AM    THER/PROPH/DIAG INJ SC/IM    Reviewed

 

                    2012 12:00 AM    B12 Injection([place]ella) Ndc#7397-5854-47-    Reviewed

 

                    12/15/2016 12:00 AM    B12 Injection, Up to 1000 Mcg NDC#2102-0700-52    Reviewed

 

                    12/15/2016 12:00 AM    THER/PROPH/DIAG INJ SC/IM    Reviewed

 

                    2016 12:00 AM    URNLS DIP STICK/TABLET RGNT AUTO W/O MICROSCOPY    Reviewed

 

                    1/3/2017 12:00 AM    URNLS DIP STICK/TABLET RGNT AUTO W/O MICROSCOPY    Reviewed

 

                    2017 12:00 AM    URINE CULTURE/COLONY COUNT    Reviewed

 

                    2017 12:00 AM    Rocephin 1 gram Injection, [facility]    Reviewed

 

                    2017 12:00 AM    THERAPEUTIC PROPHYLACTIC/DX INJECTION SUBQ/IM    Reviewed

 

                    2017 12:00 AM    B12 1000mcg Injection, [facility]    Reviewed

 

                    5/3/2012 12:00 AM    THER/PROPH/DIAG INJ SC/IM    Reviewed

 

                    5/3/2012 12:00 AM    B12 Injection([facility]) Ndc#9516-8657-14-    Reviewed

 

                    2017 12:00 AM    THERAPEUTIC PROPHYLACTIC/DX INJECTION SUBQ/IM    Reviewed

 

                    2017 12:00 AM    B12 1000mcg Injection, [facility] Medicare    Reviewed

 

                    2017 12:00 AM    MRI BRAIN STEM W/O & W/DYE    Reviewed

 

                    2017 12:00 AM    VITAMIN B-12        Reviewed

 

                    2017 12:00 AM    Speech Therapy Consult    Reviewed

 

                    2017 12:00 AM    ASSAY OF CREATININE    Reviewed

 

                    2012 12:00 AM    IMMUNOTHERAPY INJECTIONS    Reviewed

 

                    2012 12:00 AM    B12 Injection([facility]) Ndc#9795-8638-87-    Reviewed

 

                    2017 12:00 AM    URINALYSIS AUTO W/SCOPE    Reviewed

 

                    2012 12:00 AM    THER/PROPH/DIAG INJ SC/IM    Reviewed

 

                    2012 12:00 AM    B12 Injection, Up to 1000 Mcg NDC#3087-2475-94    Reviewed

 

                    2017 12:00 AM    URINALYSIS AUTO W/SCOPE    Reviewed

 

                    2017 2:18 PM    URINALYSIS AUTO W/O SCOPE    Reviewed

 

                    2017 12:00 AM    URINE CULTURE/COLONY COUNT    Reviewed

 

                    2017 12:00 AM    B12 1000mcg Injection    Reviewed

 

                    2017 12:00 AM    URNLS DIP STICK/TABLET RGNT AUTO W/O MICROSCOPY    Returned

 

                    2012 12:00 AM    THER/PROPH/DIAG INJ SC/IM    Reviewed

 

                    2012 12:00 AM    B12 Injection, Up to 1000 Mcg NDC#2308-9195-70    Reviewed

 

                    2012 12:00 AM    THER/PROPH/DIAG INJ SC/IM    Reviewed

 

                    2012 12:00 AM    B12 Injection, Up to 1000 Mcg NDC#4497-7208-19    Reviewed

 

                    10/16/2012 12:00 AM    THER/PROPH/DIAG INJ SC/IM    Reviewed

 

                    10/16/2012 12:00 AM    B12 Injection, Up to 1000 Mcg NDC#7162-3441-55    Reviewed

 

                    2010 12:00 AM    COMPREHEN METABOLIC PANEL    Reviewed

 

                    2010 12:00 AM    COMPLETE CBC W/AUTO DIFF WBC    Reviewed

 

                    2010 12:00 AM    LIPID PANEL         Reviewed

 

                    2013 12:00 AM    Flu Injection 3 Years And Above NDC# [address]-54  [facility]    Reviewed



 

                    2013 12:00 AM    COMPLETE CBC W/AUTO DIFF WBC    Reviewed

 

                    2013 12:00 AM    ASSAY OF LITHIUM    Reviewed

 

                    2013 12:00 AM    METABOLIC PANEL TOTAL CA    Reviewed

 

                    4/3/2013 12:00 AM    THER/PROPH/DIAG INJ SC/IM    Reviewed

 

                    4/3/2013 12:00 AM    B12 Injection, Up to 1000 Mcg NDC#6450-7041-38    Reviewed

 

                    2013 12:00 AM    THER/PROPH/DIAG INJ SC/IM    Reviewed

 

                    2013 12:00 AM    B12 Injection, Up to 1000 Mcg NDC#1667-2965-20    Reviewed

 

                    2013 12:00 AM    THER/PROPH/DIAG INJ SC/IM    Reviewed

 

                    2013 12:00 AM    B12 Injection, Up to 1000 Mcg NDC#6145-8938-39    Reviewed

 

                    2013 12:00 AM    LIPID PANEL         Reviewed

 

                    2013 12:00 AM    VITAMIN D 25 HYDROXY    Reviewed

 

                    2013 12:00 AM    THER/PROPH/DIAG INJ SC/IM    Reviewed

 

                    2013 12:00 AM    B12 Injection, Up to 1000 Mcg NDC#0901-5168-48    Reviewed

 

                    2013 12:00 AM    THER/PROPH/DIAG INJ SC/IM    Reviewed

 

                    3/6/2014 12:00 AM    THER/PROPH/DIAG INJ SC/IM    Reviewed

 

                    2014 12:00 AM    THER/PROPH/DIAG INJ SC/IM    Reviewed

 

                    2014 12:00 AM    B12 Injection, Up to 1000 Mcg NDC#5215-7466-02    Reviewed

 

                    2010 12:00 AM    SKIN FUNGI CULTURE    Reviewed

 

                    10/9/2010 12:00 AM    COMPREHEN METABOLIC PANEL    Reviewed

 

                    10/9/2010 12:00 AM    LIPID PANEL         Reviewed

 

                    2010 12:00 AM    THER/PROPH/DIAG INJ SC/IM    Reviewed

 

                    2010 12:00 AM    B12 Injection Ndc#[address]-43 ([facility])    Reviewed

 

                    2010 12:00 AM    THER/PROPH/DIAG INJ SC/IM    Reviewed

 

                    2010 12:00 AM    Kenalog 40 Mg Im-Nd#[address]-55 ([facility])    Reviewed

 

                    10/15/2010 12:00 AM    FLU VACCINE 3 YRS & > IM    Reviewed

 

                    10/15/2010 12:00 AM    Admin.Of M/C Cov.Vaccine-Flu Vacc.    Reviewed

 

                    1/15/2011 12:00 AM    COMPLETE CBC W/AUTO DIFF WBC    Reviewed

 

                    1/15/2011 12:00 AM    COMPREHEN METABOLIC PANEL    Reviewed

 

                    1/15/2011 12:00 AM    LIPID PANEL         Reviewed

 

                    2014 12:00 AM    MAMMOGRAM SCREENING    Reviewed

 

                    2014 12:00 AM    Screening mammography, bilateral    Reviewed

 

                    7/10/2014 12:00 AM    THER/PROPH/DIAG INJ SC/IM    Reviewed

 

                    7/10/2014 12:00 AM    B12 Injection, Up to 1000 Mcg NDC#1607-0503-74    Reviewed

 

                    2011 12:00 AM    COMPLETE CBC W/AUTO DIFF WBC    Reviewed

 

                    2011 12:00 AM    COMPREHEN METABOLIC PANEL    Reviewed

 

                    2011 12:00 AM    LIPID PANEL         Reviewed

 

                    2014 12:00 AM    B12 Injection, Up to 1000 Mcg NDC#3195-9445-64    Reviewed

 

                    10/19/2014 12:00 AM    MAMMOGRAM SCREENING    Reviewed

 

                    10/19/2014 12:00 AM    Screening mammography, bilateral    Reviewed

 

                    10/16/2014 12:00 AM    B12 Injection, Up to 1000 Mcg NDC#3046-4967-98    Reviewed

 

                    10/16/2014 12:00 AM    COMPLETE CBC W/AUTO DIFF WBC    Reviewed

 

                    10/16/2014 12:00 AM    COMPREHEN METABOLIC PANEL    Reviewed

 

                    10/16/2014 12:00 AM    IMMUNOASSAY TUMOR     Reviewed

 

                    10/16/2014 12:00 AM    LIPID PANEL         Reviewed

 

                    10/16/2014 12:00 AM    ASSAY OF LITHIUM    Reviewed

 

                    10/16/2014 12:00 AM    MAMMOGRAM SCREENING    Reviewed

 

                    2011 12:00 AM    ASSAY OF PARATHORMONE    Reviewed

 

                    2011 12:00 AM    VITAMIN D 25 HYDROXY    Reviewed

 

                    2011 12:00 AM    ASSAY OF LITHIUM    Reviewed

 

                    2011 12:00 AM    METABOLIC PANEL TOTAL CA    Reviewed

 

                    2011 12:00 AM    CT HEAD/BRAIN W/O & W/DYE    Reviewed

 

                    3/23/2015 12:00 AM    PNEUMOCOCCAL VACC 13 GLENDY IM    Reviewed

 

                    3/23/2015 12:00 AM    Vitamin B12 injection    Reviewed

 

                    2011 12:00 AM    ASSAY OF LITHIUM    Reviewed

 

                    2011 12:00 AM    B12 Injection Ndc#[address]-00  [facility]    Reviewed

 

                    2015 12:00 AM    THER/PROPH/DIAG INJ SC/IM    Reviewed

 

                    2015 12:00 AM    B12 Injection, Up to 1000 Mcg NDC#0290-9239-28    Reviewed

 

                    2015 12:00 AM    COMPLETE CBC W/AUTO DIFF WBC    Reviewed

 

                    2015 12:00 AM    COMPREHEN METABOLIC PANEL    Reviewed

 

                    2015 12:00 AM    LIPID PANEL         Reviewed

 

                    2015 12:00 AM    ASSAY OF LITHIUM    Reviewed

 

                    2011 12:00 AM    VIT D 1 25-DIHYDROXY    Reviewed

 

                    2011 12:00 AM    VITAMIN B-12        Reviewed

 

                    2015 12:00 AM    B12 Injection, Up to 1000 Mcg NDC#9935-0377-17    Reviewed

 

                    2015 12:00 AM    THER/PROPH/DIAG INJ SC/IM    Reviewed

 

                    2015 12:00 AM    B12 Injection, Up to 1000 Mcg NDC#3648-0043-77    Reviewed

 

                    2011 12:00 AM    THER/PROPH/DIAG INJ SC/IM    Reviewed

 

                    2011 12:00 AM    B12 Injection ([facility]) Ndc#9091-7845-03    Reviewed

 

                    2015 12:00 AM    THER/PROPH/DIAG INJ SC/IM    Reviewed

 

                    2015 12:00 AM    B12 Injection, Up to 1000 Mcg NDC#3337-2890-15    Reviewed







Results Summary







                          Date and Description      Results

 

                          2004 12:00 AM        Colonoscopy-Women and Men over 50 Normal 

 

                          2008 12:00 AM         Pap Smear Declined 

 

                          10/7/2009 12:00 AM        Cholest Cry Stone Ql .0 %LDLc SerPl-mCnc 123.0 mg/dLHDLc

 SerPl-mCnc 34.0 mg/dLTrigl SerPl-mCnc 190.0 mg/dLGlucose SerPl-mCnc 78.0 mg/dL

 

                          2009 12:00 AM        Mammogram -Women over 40 Normal HIV1+2 Ab Ser Ql no risk 

 

                          2010 8:47 AM         Dexa Bone Scan Refused Aspirin reccommended Contraindication 



 

                          2010 8:48 AM         Depression Done 

 

                          2010 12:00 AM         Foot Exam-Diabetic Done 

 

                          2010 12:00 AM         Cholest Cry Stone Ql .0 %LDLc SerPl-mCnc 126.0 mg/dLGlucose

 SerPl-mCnc 102.0 mg/dL

 

                          2010 8:45 AM          TRIGLYCERIDES 122.0 mg/dLCHOLESTEROL 186.0 mg/dLHDL 36.0 mg/dLTOT

 CHOL/HDL 5.2 LDL (CALC) 126.0 mg/dLGLUCOSE 102.0 mg/dLSODIUM 143.0 
mmol/LPOTASSIUM 3.70 mmol/LCHLORIDE 111.0 mmol/LCO2 23.0 mmol/LBUN 10.0 
mg/dLCREATININE 0.80 mg/dLSGOT/AST 12.0 IU/LSGPT/ALT 11.0 IU/LALK PHOS 65.0 
IU/LTOTAL PROTEIN 7.20 g/dLALBUMIN 3.90 g/dLTOTAL BILI 0.50 mg/dLCALCIUM 10.20 
mg/dLAGE 59 GFR NonAA 73 GFR AA 88 eGFR >60 mL/min/1.73 m2eGFR AA* >60 WBC 5.7 
RBC 3.26 HGB 10.60 g/dLHCT 31.70 %MCV 97.0 fLMCH 32.50 pgMCHC 33.40 g/dLRDW SD 
47 RDW CV 13.30 %MPV 9.70 fLPLT 287 NRBC# 0.00 NRBC% 0.0 %NEUT 62.90 %%LYMP 
21.80 %%MONO 9.90 %%EOS 5.0 %%BASO 0.40 %#NEUT 3.56 #LYMP 1.23 #MONO 0.56 #EOS 
0.28 #BASO 0.02 MANUAL DIFF NOT IND 

 

                          2010 12:00 AM        Glucose SerPl-mCnc 96.0 mg/dLCholest Cry Stone Ql .0 %LDLc

 SerPl-mCnc 146.0 mg/dL

 

                          2010 8:26 AM         TRIGLYCERIDES 106.0 mg/dLCHOLESTEROL 199.0 mg/dLHDL 32.0 mg/dLTOT

 CHOL/HDL 6.2 LDL (CALC) 146.0 mg/dLGLUCOSE 96.0 mg/dLSODIUM 143.0 
mmol/LPOTASSIUM 4.0 mmol/LCHLORIDE 113.0 mmol/LCO2 24.0 mmol/LBUN 13.0 
mg/dLCREATININE 1.0 mg/dLSGOT/AST 11.0 IU/LSGPT/ALT 6.0 IU/LALK PHOS 56.0 
IU/LTOTAL PROTEIN 6.60 g/dLALBUMIN 3.80 g/dLTOTAL BILI 0.50 mg/dLCALCIUM 9.30 
mg/dLAGE 59 GFR NonAA 57 GFR AA 69 eGFR 57 eGFR AA* >60 

 

                          10/6/2010 12:00 AM        Cholest Cry Stone Ql .0 %LDLc SerPl-mCnc 111.0 mg/dLGlucose

 SerPl-mCnc 81.0 mg/dL

 

                          10/6/2010 2:45 PM         TRIGLYCERIDES 123.0 mg/dLCHOLESTEROL 178.0 mg/dLHDL 42.0 mg/dLTOT

 CHOL/HDL 4.2 LDL (CALC) 111.0 mg/dLGLUCOSE 81.0 mg/dLSODIUM 139.0 
mmol/LPOTASSIUM 4.10 mmol/LCHLORIDE 106.0 mmol/LCO2 24.0 mmol/LBUN 13.0 
mg/dLCREATININE 0.90 mg/dLSGOT/AST 13.0 IU/LSGPT/ALT 11.0 IU/LALK PHOS 61.0 
IU/LTOTAL PROTEIN 7.10 g/dLALBUMIN 3.90 g/dLTOTAL BILI 0.30 mg/dLCALCIUM 9.30 
mg/dLAGE 60 GFR NonAA 64 GFR AA 78 eGFR >60 mL/min/1.73 m2eGFR AA* >60 WBC 6.9 
RBC 3.59 HGB 11.50 g/dLHCT 35.30 %MCV 98.0 fLMCH 32.0 pgMCHC 32.60 g/dLRDW SD 46
 RDW CV 12.90 %MPV 9.90 fLPLT 311 NRBC# 0.00 NRBC% 0.0 %NEUT 64.90 %%LYMP 22.50 
%%MONO 7.20 %%EOS 5.10 %%BASO 0.30 %#NEUT 4.45 #LYMP 1.54 #MONO 0.49 #EOS 0.35 
#BASO 0.02 MANUAL DIFF NOT IND 

 

                          2011 12:00 AM         Mammogram -Women over 40 Ordered 

 

                          2011 10:25 AM        TRIGLYCERIDES 111.0 mg/dLCHOLESTEROL 195.0 mg/dLHDL 43.0 mg/dLTOT

 CHOL/HDL 4.5 LDL (CALC) 130.0 mg/dLWBC 5.3 RBC 3.76 HGB 12.0 g/dLHCT 37.80 %MCV
 101.0 fLMCH 31.90 pgMCHC 31.70 g/dLRDW SD 47 RDW CV 13.0 %MPV 9.70 fLPLT 259 
NRBC# 0.00 NRBC% 0.0 %NEUT 69.0 %%LYMP 17.60 %%MONO 8.30 %%EOS 4.70 %%BASO 0.40 
%#NEUT 3.63 #LYMP 0.93 #MONO 0.44 #EOS 0.25 #BASO 0.02 MANUAL DIFF NOT IND 
GLUCOSE 102.0 mg/dLSODIUM 146.0 mmol/LPOTASSIUM 4.20 mmol/LCHLORIDE 113.0 
mmol/LCO2 23.0 mmol/LBUN 15.0 mg/dLCREATININE 1.0 mg/dLSGOT/AST 12.0 
IU/LSGPT/ALT 17.0 IU/LALK PHOS 60.0 IU/LTOTAL PROTEIN 6.90 g/dLALBUMIN 4.20 
g/dLTOTAL BILI 0.40 mg/dLCALCIUM 9.70 mg/dLAGE 60 GFR NonAA 57 GFR AA 69 eGFR 57
 eGFR AA* >60 

 

                          2011 11:49 AM        Cholest Cry Stone Ql .0 %LDLc SerPl-mCnc 130.0 mg/dLHDLc

 SerPl-mCnc 43.0 mg/dLTrigl SerPl-mCnc 111.0 mg/dLGlucose SerPl-mCnc 102.0 mg/dL

 

                          2011 11:52 AM        Pap Smear Declined 

 

                          2011 11:28 AM        Lithium 2.080 mmol/LGLUCOSE 102.0 mg/dLSODIUM 135.0 mmol/LPOTASSIUM

 3.90 mmol/LCHLORIDE 106.0 mmol/LCO2 21.0 mmol/LBUN 12.0 mg/dLCREATININE 1.30 
mg/dLCALCIUM 10.70 mg/dLAGE 60 GFR NonAA 42 GFR AA 51 eGFR 42 eGFR AA* 51 

 

                          2011 8:58 AM          Lithium 0.690 mmol/L

 

                          2011 2:38 PM         VITAMIN B12 3483.0 pg/mL

 

                          2013 3:35 PM          WBC 5.1 RBC 3.73 HGB 11.70 g/dLHCT 36.40 %MCV 98.0 fLMCH 31.40

 pgMCHC 32.10 g/dLRDW SD 47 RDW CV 13.10 %MPV 9.80 fLPLT 224 NRBC# 0.00 NRBC% 
0.0 %NEUT 66.80 %%LYMP 19.10 %%MONO 9.0 %%EOS 4.90 %%BASO 0.20 %#NEUT 3.42 #LYMP
 0.98 #MONO 0.46 #EOS 0.25 #BASO 0.01 MANUAL DIFF NOT IND GLUCOSE 88.0 
mg/dLSODIUM 141.0 mmol/LPOTASSIUM 4.10 mmol/LCHLORIDE 110.0 mmol/LCO2 22.0 
mmol/LBUN 22.0 mg/dLCREATININE 1.10 mg/dLCALCIUM 9.80 mg/dLAGE 62 GFR NonAA 50 
GFR AA 61 eGFR 50 eGFR AA* 60 Lithium 0.760 mmol/L

 

                          2013 11:02 AM        TRIGLYCERIDES 106.0 mg/dLCHOLESTEROL 181.0 mg/dLHDL 46.0 mg/dLTOT

 CHOL/HDL 3.9 LDL (CALC) 114.0 mg/dLVITAMIN D 41.10 ng/mL

 

                          10/17/2014 10:10 AM       WBC 5.0 RBC 3.66 HGB 11.60 g/dLHCT 36.80 %.0 fLMCH 31.70

 pgMCHC 31.50 g/dLRDW SD 50 RDW CV 13.50 %MPV 10.10 fLPLT 209 NRBC# 0.00 NRBC% 
0.0 %NEUT 69.20 %%LYMP 21.0 %%MONO 6.40 %%EOS 3.20 %%BASO 0.20 %#NEUT 3.46 #LYMP
 1.05 #MONO 0.32 #EOS 0.16 #BASO 0.01 MANUAL DIFF NOT IND GLUCOSE 100.0 
mg/dLSODIUM 148.0 mmol/LPOTASSIUM 3.90 mmol/LCHLORIDE 114.0 mmol/LCO2 26.0 
mmol/LBUN 12.0 mg/dLCREATININE 1.20 mg/dLSGOT/AST 9.0 IU/LSGPT/ALT <6 IU/LALK 
PHOS 82.0 IU/LTOTAL PROTEIN 6.90 g/dLALBUMIN 4.0 g/dLTOTAL BILI 0.40 
mg/dLCALCIUM 10.50 mg/dLAGE 64 GFR NonAA 45 GFR AA 55 eGFR 45 eGFR AA* 55 
TRIGLYCERIDES 96.0 mg/dLCHOLESTEROL 155.0 mg/dLHDL 38.0 mg/dLTOT CHOL/HDL 4.1 
LDL (CALC) 98.0 mg/dLLithium 0.850 mmol/LCancer Antigen (CA) 125 8.30 U/mL

 

                          2015 10:25 AM        Lithium 0.790 mmol/LWBC 4.8 RBC 3.44 HGB 11.0 g/dLHCT 35.20 

%.0 fLMCH 32.0 pgMCHC 31.30 g/dLRDW SD 53 RDW CV 14.0 %MPV 9.30 fLPLT 210
 NRBC# 0.00 NRBC% 0.0 %NEUT 70.80 %%LYMP 17.20 %%MONO 8.10 %%EOS 3.50 %%BASO 
0.40 %#NEUT 3.41 #LYMP 0.83 #MONO 0.39 #EOS 0.17 #BASO 0.02 MANUAL DIFF NOT IND 
TRIGLYCERIDES 107.0 mg/dLCHOLESTEROL 174.0 mg/dLHDL 43.0 mg/dLTOT CHOL/HDL 4.0 
LDL (CALC) 110.0 mg/dLGLUCOSE 90.0 mg/dLSODIUM 145.0 mmol/LPOTASSIUM 3.80 
mmol/LCHLORIDE 115.0 mmol/LCO2 24.0 mmol/LBUN 17.0 mg/dLCREATININE 1.30 
mg/dLSGOT/AST 18.0 IU/LSGPT/ALT 17.0 IU/LALK PHOS 56.0 IU/LTOTAL PROTEIN 6.70 
g/dLALBUMIN 3.90 g/dLTOTAL BILI 0.40 mg/dLCALCIUM 9.80 mg/dLAGE 64 GFR NonAA 41 
GFR AA 50 eGFR 41 eGFR AA* 50 

 

                          2015 8:50 AM        WBC 5.8 RBC 3.29 HGB 10.70 g/dLHCT 34.0 %.0 fLMCH 32.50

 pgMCHC 31.50 g/dLRDW SD 52 RDW CV 13.60 %MPV 9.60 fLPLT 223 NRBC# 0.00 NRBC% 
0.0 %NEUT 69.60 %%LYMP 18.90 %%MONO 8.50 %%EOS 2.80 %%BASO 0.20 %#NEUT 4.03 
#LYMP 1.09 #MONO 0.49 #EOS 0.16 #BASO 0.01 MANUAL DIFF NOT IND Lithium 0.620 
mmol/LGLUCOSE 83.0 mg/dLSODIUM 139.0 mmol/LPOTASSIUM 3.90 mmol/LCHLORIDE 109.0 
mmol/LCO2 22.0 mmol/LBUN 19.0 mg/dLCREATININE 1.40 mg/dLSGOT/AST 19.0 
IU/LSGPT/ALT 21.0 IU/LALK PHOS 55.0 IU/LTOTAL PROTEIN 6.50 g/dLALBUMIN 3.90 
g/dLTOTAL BILI 0.50 mg/dLCALCIUM 9.60 mg/dLAGE 65 GFR NonAA 38 GFR AA 46 eGFR 38
 eGFR AA* 46 TRIGLYCERIDES 121.0 mg/dLCHOLESTEROL 192.0 mg/dLHDL 51.0 mg/dLTOT 
CHOL/HDL 3.8 .0 mg/dLFREE T4 0.79 TSH 1.210 uIU/mLHemoglobin A1c 5.40 
%Estim. Avg Glu (eAG) 108 

 

                          3/15/2016 8:08 AM         VITAMIN B12 696.0 pg/mL

 

                          3/23/2016 8:26 AM         WBC 7.0 RBC 3.61 HGB 11.80 g/dLHCT 37.70 %.0 fLMCH 32.70

 pgMCHC 31.30 g/dLRDW SD 49 RDW CV 12.50 %MPV 10.0 fLPLT 207 NRBC# 0.00 NRBC% 
0.0 %NEUT 73.60 %%LYMP 16.40 %%MONO 6.60 %%EOS 3.0 %%BASO 0.30 %#NEUT 5.15 #LYMP
 1.15 #MONO 0.46 #EOS 0.21 #BASO 0.02 MANUAL DIFF NOT IND Lithium 0.940 
mmol/LGLUCOSE 108.0 mg/dLSODIUM 143.0 mmol/LPOTASSIUM 4.30 mmol/LCHLORIDE 110.0 
mmol/LCO2 27.0 mmol/LBUN 16.0 mg/dLCREATININE 1.60 mg/dLSGOT/AST 13.0 
IU/LSGPT/ALT 7.0 IU/LALK PHOS 71.0 IU/LTOTAL PROTEIN 6.80 g/dLALBUMIN 4.0 
g/dLTOTAL BILI 0.20 mg/dLCALCIUM 10.40 mg/dLAGE 65 GFR NonAA 32 GFR AA 39 eGFR 
32 eGFR AA* 39 TRIGLYCERIDES 113.0 mg/dLCHOLESTEROL 169.0 mg/dLHDL 42.0 mg/dLTOT
 CHOL/HDL 4.0 LDL (CALC) 104.0 mg/dLFREE T4 0.86 TSH 2.20 uIU/mLHemoglobin A1c 
5.20 %Estim. Avg Glu (eAG) 103 

 

                          3/25/2016 9:17 AM         COLOR YELLOW APPEARANCE CLEAR SPEC GRAV 1.010 pH 7.0 PROTEIN 

NEGATIVE GLUCOSE NEGATIVE mg/dLKETONE NEGATIVE BILIRUBIN NEGATIVE BLOOD NEGATIVE
 NITRITE NEGATIVE [place] SCREEN SMALL MICRO IND? SEE BELOW WBC/HPF 0-5 RBC/HPF 
NEGATIVE CASTS/LPF NEGATIVE /LPFCRYSTALS NEGATIVE MUCOUS THRDS NEGATIVE BACTERIA
 NEGATIVE EPITH CELLS FEW SQUAMOUS /HPFTRICHOMONAS NEGATIVE YEAST NEGATIVE 

 

                          2016 6:00 AM        GLUCOSE 91.0 mg/dLSODIUM 143.0 mmol/LPOTASSIUM 3.60 mmol/LCHLORIDE

 112.0 mmol/LCO2 23.0 mmol/LBUN 22.0 mg/dLCREATININE 1.20 mg/dLSGOT/AST 15.0 
IU/LSGPT/ALT 12.0 IU/LALK PHOS 61.0 IU/LTOTAL PROTEIN 5.40 g/dLALBUMIN 3.10 
g/dLTOTAL BILI 0.40 mg/dLCALCIUM 8.40 mg/dLAGE 66 GFR NonAA 45 GFR AA 55 eGFR 45
 eGFR AA* 55 WBC 3.0 RBC 3.05 HGB 9.80 g/dLHCT 32.10 %.0 fLMCH 32.10 
pgMCHC 30.50 g/dLRDW SD 54 RDW CV 14.20 %MPV 10.10 fLPLT 170 NRBC# 0.00 NRBC% 
0.0 %NEUT 50.70 %%LYMP 32.60 %%MONO 10.50 %%EOS 5.90 %%BASO 0.30 %#NEUT 1.54 
#LYMP 0.99 #MONO 0.32 #EOS 0.18 #BASO 0.01 MANUAL DIFF NOT IND 

 

                          2016 2:09 PM        COLOR YELLOW APPEARANCE CLEAR SPEC GRAV 1.010 pH 5.0 PROTEIN

 30 GLUCOSE NEGATIVE mg/dLKETONE NEGATIVE BILIRUBIN NEGATIVE BLOOD LARGE NITRITE
 NEGATIVE [place] SCREEN MODERATE MICRO INDICATED? SEE BELOW WBC/HPF  RBC/HPF
 20-50 CASTS/LPF NEGATIVE /LPFCRYSTALS NEGATIVE MUCOUS THRDS NEGATIVE BACTERIA 
NEGATIVE EPITH CELLS FEW SQUAMOUS /HPFTRICHOMONAS NEGATIVE YEAST NEGATIVE CULT 
SET UP? YES 

 

                          1/3/2017 4:08 PM          COLOR YELLOW APPEARANCE HAZY SPEC GRAV 1.015 pH 6.0 PROTEIN 30

 GLUCOSE NEGATIVE mg/dLKETONE NEGATIVE BILIRUBIN NEGATIVE BLOOD MODERATE NITRITE
 NEGATIVE [place] SCREEN LARGE MICRO INDICATED? SEE BELOW WBC/-200 RBC/HPF 
5-10 CASTS/LPF NEGATIVE /LPFCRYSTALS NEGATIVE MUCOUS THRDS NEGATIVE BACTERIA 
NEGATIVE EPITH CELLS 1+ SQUAMOUS /HPFTRICHOMONAS NEGATIVE YEAST NEGATIVE CULT 
SET UP? YES 

 

                          2017 4:24 PM         CREATININE 1.50 mg/dLAGE 66 GFR NonAA 35 GFR AA 42 eGFR 35 eGFR

 AA* 42 VITAMIN B12 1324.0 pg/mL

 

                          2017 11:30 AM         GLUCOSE 93.0 mg/dLSODIUM 143.0 mmol/LPOTASSIUM 3.10 mmol/LCHLORIDE

 101.0 mmol/LCO2 29.0 mmol/LBUN 26.0 mg/dLCREATININE 1.50 mg/dLSGOT/AST 23.0 
IU/LSGPT/ALT 13.0 IU/LALK PHOS 66.0 IU/LTOTAL PROTEIN 7.70 g/dLALBUMIN 4.30 
g/dLTOTAL BILI 0.40 mg/dLCALCIUM 10.30 mg/dLAGE 66 GFR NonAA 35 GFR AA 42 eGFR 
35 eGFR AA* 42 TRIGLYCERIDES 147.0 mg/dLCHOLESTEROL 184.0 mg/dLHDL 44.0 mg/dLTOT
 CHOL/HDL 4.2 .0 mg/dLWBC 5.4 RBC 3.98 HGB 12.90 g/dLHCT 40.20 %.0
 fLMCH 32.40 pgMCHC 32.10 g/dLRDW SD 50 RDW CV 13.50 %MPV 9.30 fLPLT 210 NRBC# 
0.00 NRBC% 0.0 %NEUT 54.20 %%LYMP 30.70 %%MONO 9.10 %%EOS 5.20 %%BASO 0.40 
%#NEUT 2.94 #LYMP 1.66 #MONO 0.49 #EOS 0.28 #BASO 0.02 MANUAL DIFF NOT IND FREE 
T4 1.09 COLOR YELLOW APPEARANCE CLEAR SPEC GRAV <=1.005 pH 5.5 PROTEIN NEGATIVE 
GLUCOSE NEGATIVE mg/dLKETONE NEGATIVE BILIRUBIN NEGATIVE BLOOD NEGATIVE NITRITE 
NEGATIVE [place] SCREEN LARGE MICRO INDICATED? SEE BELOW WBC/HPF 10-20 RBC/HPF 0-5 
CASTS/LPF NEGATIVE /LPFCRYSTALS NEGATIVE MUCOUS THRDS NEGATIVE BACTERIA FEW 
EPITH CELLS 1+ SQUAMOUS /HPFTRICHOMONAS NEGATIVE YEAST NEGATIVE CULT SET UP? YES
 TSH 1.820 uIU/mL

 

                          2017 2:45 PM         COLOR YELLOW APPEARANCE CLEAR SPEC GRAV <=1.005 pH 6.0 PROTEIN

 NEGATIVE GLUCOSE NEGATIVE mg/dLKETONE NEGATIVE BILIRUBIN NEGATIVE BLOOD TRACE-
INTACT NITRITE NEGATIVE [place] SCREEN SMALL MICRO INDICATED? SEE BELOW WBC/HPF 0-5
 RBC/HPF NEGATIVE CASTS/LPF NEGATIVE /LPFCRYSTALS NEGATIVE MUCOUS THRDS NEGATIVE
 BACTERIA FEW EPITH CELLS FEW SQUAMOUS /HPFTRICHOMONAS NEGATIVE YEAST NEGATIVE 
CULT SET UP? YES 

 

                          2017 11:22 AM        COLOR YELLOW APPEARANCE CLEAR SPEC GRAV <=1.005 pH 6.5 PROTEIN

 NEGATIVE GLUCOSE NEGATIVE mg/dLKETONE NEGATIVE BILIRUBIN NEGATIVE BLOOD 
NEGATIVE NITRITE NEGATIVE LEUK SCREEN NEGATIVE MICRO INDICATED? NOT INDICATED 

 

                          2017 2:18 PM         Clarity Ur cloudy Color Ur dark yellow Glucose Ur-sCnc negative

 Bilirub Ur Ql Strip negative Ketones Ur Ql Strip negative Sp Gr Ur Qn 1.010 Hgb
 Ur Ql Strip trace-intact pH Ur-LsCnc 6.5 Prot Ur Ql Strip trace Urobilinogen 
Ur-mCnc 0.2 Nitrite Ur Ql Strip negative WBC Est Ur Ql Strip large 







History Of Immunizations







       Name    Date Admin    Mfg Name    Mfg Code    Trade Name    Lot#    Route    Inj    Vis Given    Vis

 Pub                                    CVX

 

        Influenza    2008    Not Entered    NE      Not Entered            Not Entered    Not Entered

                    1            999

 

        X       12/19/2008    [employer]    MSD     Pneumovax 23            Intramuscular    Not Entered

                    1            999

 

           Influenza    10/15/2010    [employer]    NOV        Fluvirin > 12 Years    

[id number]     Intramuscular    Left Deltoid    10/15/2010    2009    999

 

          X         3/23/2015    [facility]    [facility]       Prevnar 13    [id number]    Intramuscular

                Right Gluteous Medius    3/23/2015       2013       109







History of Past Illness







                    Name                Date of Onset       Comments

 

                    Peritoneal Neoplasm, Malignant                         

 

                    Hyperlipidemia                           

 

                    Bipolar disorder, unspecified                         

 

                    Artificial opening status; colostomy                         

 

                    B12 deficiency                           

 

                    Anemia, Pernicious                         

 

                    Arthritis unspecified                         

 

                    cervical cancer                          

 

                    Artificial opening status; colostomy    2010  1:10PM     

 

                    Bipolar disorder, unspecified    2010  1:10PM     

 

                    Hyperlipidemia      2010  1:10PM     

 

                    Anemia, Pernicious    2010  1:10PM     

 

                    Postoperative Follow-Up    2010  1:55PM     

 

                    Postoperative Follow-Up    Mar  8 2010 10:57AM     

 

                    Artificial opening status; colostomy    Mar  8 2010  1:19PM     

 

                    Peritoneal Neoplasm, Malignant    Mar  8 2010  1:19PM     

 

                    Artificial opening status; colostomy    2010  1:40PM     

 

                    Hyperlipidemia      2010  1:40PM     

 

                    Anemia, Pernicious    2010  1:40PM     

 

                    Peritoneal Neoplasm, Malignant    2010  1:40PM     

 

                    Arthritis unspecified    2010  1:40PM     

 

                    Anemia of Chronic Illness    2010  1:40PM     

 

                    Tinea corporis      2010  3:17PM     

 

                    Bipolar disorder, unspecified    2010  1:33PM     

 

                    Hyperlipidemia      2010  1:33PM     

 

                    Anemia, Pernicious    2010  1:33PM     

 

                    Peritoneal Neoplasm, Malignant    2010  1:33PM     

 

                    B12 deficiency      2010  1:33PM     

 

                    Ethmoidal Sinusitis, Acute    Sep 21 2010  3:53PM     

 

                    Wheezing            Sep 21 2010  3:53PM     

 

                    Flu                 Oct 15 2010  1:40PM     

 

                    Bipolar disorder, unspecified    Oct 15 2010  1:42PM     

 

                    Hyperlipidemia      Oct 15 2010  1:42PM     

 

                    Anemia, Pernicious    Oct 15 2010  1:42PM     

 

                    Peritoneal Neoplasm, Malignant    Oct 15 2010  1:42PM     

 

                    Bipolar disorder, unspecified    2011 12:01PM     

 

                    Hyperlipidemia      2011 12:01PM     

 

                    Anemia, Pernicious    2011 12:01PM     

 

                    Peritoneal Neoplasm, Malignant    2011 12:01PM     

 

                    Bipolar disorder, unspecified    Apr 15 2011 10:55AM     

 

                    Major Depression    2011 10:11AM     

 

                    Bipolar Disorder    2011 10:11AM     

 

                    Cancer              May 10 2011  4:16PM     

 

                    Major Depression    May 10 2011  3:16PM     

 

                    Bipolar Disorder    May 10 2011  3:16PM     

 

                    Hypercalcemia       May 23 2011  2:47PM     

 

                    Bipolar disorder, unspecified    May 23 2011  2:47PM     

 

                    Colon Cancer, Personal History    May 23 2011  2:47PM     

 

                    Bipolar Disorder    May 31 2011  4:39PM     

 

                    Depressive Disorder    2011 10:01AM     

 

                    Vitamin B12 deficiency    2011 10:01AM     

 

                    Vitamin D Deficiency    2011  5:07PM     

 

                    Anemia, Vitamin B12 Deficiency    2011  5:07PM     

 

                    B12 deficiency      2011  3:56PM     

 

                    Routine gynecological examination    Aug  4 2011  9:08AM     

 

                    Screening Examination for Breast Cancer    Aug  4 2011  9:08AM     

 

                    Tinea Corporis      Aug  4 2011  9:08AM     

 

                    Depressive Disorder    Sep 23 2011  8:47AM     

 

                    Contact Dermatitis    Sep 23 2011  8:47AM     

 

                    Anemia, Pernicious    Sep 23 2011  8:47AM     

 

                    B12 deficiency      Sep 23 2011  8:47AM     

 

                    B12 deficiency      Sep 27 2011  2:58PM     

 

                    B12 deficiency      Oct 20 2011  2:34PM     

 

                    Flu                 Dec  9 2011  3:16PM     

 

                    B12 deficiency      Dec  9 2011  3:17PM     

 

                    B12 deficiency      2012  4:52PM     

 

                    B12 deficiency      2012 11:10AM     

 

                    B12 deficiency      2012  3:37PM     

 

                    B12 deficiency      May  3 2012  4:10PM     

 

                    B12 deficiency      2012  2:54PM     

 

                    B12 deficiency      2012 11:23AM     

 

                    B12 deficiency      Aug  9 2012  2:08PM     

 

                    B12 deficiency      Sep  6 2012  4:36PM     

 

                    B12 deficiency      Oct 16 2012 10:23AM     

 

                    Flu                 Feb  4   3:11PM     

 

                    Bipolar disorder, unspecified    Feb  4   2:48PM     

 

                    Anemia, Pernicious    Feb  4   2:48PM     

 

                    B12 deficiency      Feb  4   2:48PM     

 

                    Extrapyramidal abnormal movement disorder    Feb  4   2:48PM     

 

                    B12 deficiency      Apr  3 2013 12:03PM     

 

                    Bipolar disorder, unspecified    May  7 2013  1:31PM     

 

                    Anemia, Pernicious    May  7 2013  1:31PM     

 

                    B12 deficiency      May  7 2013  1:31PM     

 

                    Extrapyramidal abnormal movement disorder    May  7 2013  1:31PM     

 

                    B12 deficiency      2013  3:42PM     

 

                    B12 deficiency      2013  1:31PM     

 

                    Hyperlipidemia      Aug  7 2013 10:37AM     

 

                    Vitamin D Deficiency    Aug  7 2013 10:37AM     

 

                    Bipolar disorder, unspecified    Aug  7 2013 10:37AM     

 

                    Anemia, Pernicious    Aug  7 2013 10:37AM     

 

                    B12 deficiency      Aug  7 2013 10:37AM     

 

                    B12 deficiency      Sep 25 2013 11:15AM     

 

                    B12 deficiency      Dec 11 2013  3:16PM     

 

                    B12 deficiency      Mar  6 2014  1:48PM     

 

                    B12 deficiency      May 21 2014  3:17PM     

 

                    Screening Examination for Breast Cancer    2014  3:23PM     

 

                    Periumbilical abdominal pain    2014  3:23PM     

 

                    B12 deficiency      Jul 10 2014  2:52PM     

 

                    Anemia, Vitamin B12 Deficiency    Aug 13 2014  4:50PM     

 

                    Bipolar disorder    Oct 16 2014 11:13AM     

 

                    Hyperlipidemia      Oct 16 2014 11:13AM     

 

                    Anemia, Pernicious    Oct 16 2014 11:13AM     

 

                    Peritoneal Neoplasm, Malignant    Oct 16 2014 11:13AM     

 

                    Screening breast examination    Oct 16 2014 11:13AM     

 

                    Weight loss         Oct 16 2014 11:13AM     

 

                    Anemia, Pernicious    Mar 23 2015  2:57PM     

 

                    B12 deficiency      Mar 23 2015  2:57PM     

 

                    Need for Prevnar vaccine    Mar 23 2015  2:57PM     

 

                    Bipolar disorder    Mar 23 2015  2:57PM     

 

                    Hyperlipidemia      Mar 23 2015  2:57PM     

 

                    Anemia, Pernicious    Mar 23 2015  2:57PM     

 

                    Peritoneal Neoplasm, Malignant    Mar 23 2015  2:57PM     

 

                    B12 deficiency      May  4 2015  4:48PM     

 

                    Hyperlipidemia      May 13 2015  9:56AM     

 

                    Anemia              May 13 2015  9:56AM     

 

                    Bipolar disorder    May 13 2015  9:56AM     

 

                    Bipolar disorder    May 14 2015  3:27PM     

 

                    Hyperlipidemia      May 14 2015  3:27PM     

 

                    Anemia, Pernicious    May 14 2015  3:27PM     

 

                    Peritoneal Neoplasm, Malignant    May 14 2015  3:27PM     

 

                    B12 deficiency      2015  2:20PM     

 

                    B12 deficiency      2015 11:34AM     

 

                    B12 deficiency      Aug 18 2015  9:06AM     

 

                    Tinea Corporis      Sep 18 2015  8:54AM     

 

                    B12 deficiency      Sep 18 2015  8:54AM     

 

                    B12 deficiency      2015 10:28AM     

 

                    Herpes zoster without complication    Dec  3 2015  9:52AM     

 

                    B12 deficiency      Dec 23 2015 11:21AM     

 

                    B12 deficiency      2016  4:51PM     

 

                    Vitamin B 12 deficiency    Mar 14 2016  5:35PM     

 

                    B12 deficiency      Mar 15 2016 12:14PM     

 

                    B12 deficiency      May  5 2016 11:30AM     

 

                    Edema               May  5 2016 11:30AM     

 

                    Dermatitis          May  5 2016 11:30AM     

 

                    Edema               May 17 2016  8:38AM     

 

                    Shortness of breath    May 17 2016  8:38AM     

 

                    Bilateral edema of lower extremity    2016  2:06PM     

 

                    B12 deficiency      2016  2:06PM     

 

                    B12 deficiency      2016 11:50AM     

 

                    B12 deficiency      2016 11:20AM     

 

                    Diarrhea            Aug  2 2016  3:13PM     

 

                    B12 deficiency      Aug 24 2016 11:10AM     

 

                    Encounter for screening mammogram for breast cancer    Aug 24 2016 11:44AM     

 

                    B12 deficiency      Sep 28 2016  2:35PM     

 

                    B12 deficiency      Dec 15 2016  2:02PM     

 

                    Dysuria             Dec 29 2016 12:14PM     

 

                    Hematuria           Fidencio  3 2017  1:33PM     

 

                    Constipation by delayed colonic transit    2017  1:52PM     

 

                    Ileus               2017  1:52PM     

 

                    UTI (urinary tract infection)    Fidencio 15 2017  3:39PM     

 

                    Acute cystitis with hematuria    2017 11:07AM     

 

                    B12 deficiency      2017 11:07AM     

 

                    B12 deficiency      2017 11:40AM     

 

                    B12 deficiency      2017  4:07PM     

 

                    Slurred speech      2017  3:07PM     

 

                    Vitamin B12 deficiency    2017  3:07PM     

 

                    Dysphagia, unspecified type    2017  3:07PM     

 

                    Hyperlipidemia      2017  3:07PM     

 

                    Dysuria             2017 12:01PM     

 

                    B12 deficiency      2017  2:08PM     

 

                    Dysuria             2017 10:58AM     

 

                    Hematuria           May 22 2017  1:36PM     

 

                    Depressive Disorder    May 22 2017  1:36PM     

 

                    Constipation        May 22 2017  1:36PM     

 

                    Fatigue             May 22 2017  1:36PM     

 

                    Urinary tract infection    May 30 2017  9:36AM     

 

                    Hypokalemia         May 30 2017 12:03PM     







Payers







           Insurance Name    Company Name    Plan Name    Plan Number    Policy Number    Policy Group

 Number                                 Start Date

 

                    Medicare [employer]    Medicare [facility]              [id number]              N/A

 

                          [employer]    [employer]                 [id number]

                                                    

 

                    Medicare Part A    Medicare - Lab/Xray              [id number]              2006

 

                    Medicare Part B    [place]              [id number]              2006

 

                          [employer]    [employer]                 50 percent

                                                    2009

 

                    [facility]              [id number]              N/A

 

                    Medicare Part A    Medicare Part A              [id number]              N/A

 

                    Medicare Part A    Medicare Part A              [id number]              2006









History of Encounters







                    Visit Date          Visit Type          Provider

 

                    2017           Office visit        Bhupinder Aspen DO

 

                    2017           Nurse visit         Bhupinder Aspen DO

 

                    2017           Office visit         

 

                    2017           Office visit        Bhupinder Aspen DO

 

                    2017           Nurse visit         Bhupinder Aspen DO

 

                    2017           Office visit        Radha Ontiveros APRN

 

                    1/15/2017           Office visit        Aj Tapia NP

 

                    2017            Office visit        Devin Masterson MD

 

                    2016          [facility]            Devin Masterson MD

 

                    12/15/2016          Nurse visit         Bhupinder Aspen DO

 

                    2016           Nurse visit         Bret Forte APRN

 

                    2016           Nurse visit         Bhupinder Aspen DO

 

                    2016            Office visit        Bhupinder Aspen DO

 

                    2016           Nurse visit         Bhupinder Aspen DO

 

                    2016           Office visit        Bret Forte APRN

 

                    2016            Office visit        Bhupinder Aspen DO

 

                    3/15/2016           Nurse visit         Bhupinder Aspen DO

 

                    2016            Nurse visit         Bhupinder Aspen DO

 

                    2015          Nurse visit         Bhupinder Aspen DO

 

                    12/3/2015           Office visit        Bhupinder Aspen DO

 

                    2015          Nurse visit         Bhupinder Aspen DO

 

                    2015           Office visit        Bhupinder Aspen DO

 

                    2015           Nurse visit         Bhupinder Aspen DO

 

                    2015            Nurse visit         Bhupinder Aspen DO

 

                    2015            Nurse visit         Bhupinder Aspen DO

 

                    2015           Office visit        Bhupinder Aspen DO

 

                    2015            Nurse visit         Bhupinder Aspen DO

 

                    3/23/2015           Office visit        Bhupinder Aspen DO

 

                    10/16/2014          Office visit        Bhupinder Aspen DO

 

                    2014           Nurse visit         Radha Ontiveros APRN

 

                    7/10/2014           Nurse visit         Bhupinder Aspen DO

 

                    2014           Office visit        Bhupinder Aspen DO

 

                    2014           Nurse visit         Bhupinder Aspen DO

 

                    3/6/2014            Nurse visit         Bhupinder Aspen DO

 

                    2014            [facility]            EARNEST Lopez MD

 

                    2013          Nurse visit         Bhupinder Aspen DO

 

                    2013           Nurse visit         Bhupinder Aspen DO

 

                    2013            Office visit        Bhupinder Aspen DO

 

                    2013            Nurse visit         Bhupinder Aspen DO

 

                    2013            Nurse visit         Bhupinder Aspen DO

 

                    2013            Office visit        Bhupinder Aspen DO

 

                    4/3/2013            Nurse visit         Bhupinder Aspen DO

 

                    2013            Office visit        Bhupinder Aspen DO

 

                    10/16/2012          Nurse visit         Bhupinder Aspen DO

 

                    2012            Nurse visit         Bhupinder Aspen DO

 

                    2012            Voided              Bhupinder Aspen DO

 

                    2012            Nurse visit         Bhupinder Aspen DO

 

                    2012            Nurse visit         Bhupinder Aspen DO

 

                    2012           Nurse visit         Bhupinder Aspen DO

 

                    5/3/2012            Nurse visit         Bhupinder Aspen DO

 

                    2012           Nurse visit         Bhupinder Aspen DO

 

                    2012           Nurse visit         Bhupinder Aspen DO

 

                    2012           Nurse visit         Bhupinder Aspen DO

 

                    2011           Nurse visit         Bhupinder Aspen DO

 

                    10/20/2011          Nurse visit         Bhupinder Aspen DO

 

                    2011           Office visit        Bhupinder Aspen DO

 

                    2011           Nurse visit         Radha GREEN

 

                    2011            Office visit        Bhupinder Aspen DO

 

                    2011           Nurse visit         Bhupinder Aspen DO

 

                    2011            Office visit        Bhupinder Aspen DO

 

                    2011           Office visit        Bhupinder Aspen DO

 

                    5/10/2011           Office visit        Bhupinder Aspen DO

 

                    2011           Office visit        Bhupinder Aspen DO

 

                    4/15/2011           Office visit        Devin Angel DO

 

                    2011           Office visit        Devin Angel DO

 

                    10/15/2010          Office visit        Devin Angel DO

 

                    2010           Office visit        Devin Angel DO

 

                    2010            Office visit        Devin Angel DO

 

                    2010           Office visit        Devin Angel DO

 

                    2010            Office visit        Devin Angel DO

 

                    3/8/2010            Office visit        Devin Masterson MD

 

                    2010            Surgery             Devin Masterson MD

 

                    2010            Office visit        Devin Angel DO

 

                    2010           Surgery             Devin Masterson MD

 

                    2010           Hospital            Devin Masterson MD

 

                    2010           [facility]            Devin Masterson MD

 

                    10/22/2009          Office visit        Devin Angel DO suspected labral tear and possible rotator cuff tear    DME: No indication for sling  We discussed shoulder care and shoulder protection  PT: PT will be determined based on MRI scan       Medication - OTC meds prn:    Surgical discussion: Surgery will depend on MRI scan  Follow up: After MRI scan  Work status: Regular    This note was created using Dragon voice recognition software which may result in errors of speech and spelling recognition and word/phrase syntax errors.

## 2022-10-25 NOTE — XMS REPORT
[facility] called stating that they are re faxing papers over,     Call back at [phone] ext [phone]     Fax: [phone]   MU2 Ambulatory Summary

                             Created on: 2017



Pauline Gan

External Reference #: [id number]

: 1950

Sex: Female



Demographics







                          Address                   [address]

[place]  [id number]

 

                          Home Phone                [phone]

 

                          Preferred Language        English

 

                          Marital Status            Legally 

 

                          Mormonism Affiliation     Unknown

 

                          Race                      White

 

                          Ethnic Group              Not  or 





Author







                          Author                    Bhupinder Louise

 

                          [facility]

 

                          Address                   [address]

[place]  [id number]



 

                          Phone                     [phone]







Care Team Providers







                    Care Team Member Name    Role                Phone

 

                    Bhupinder Louise    PCP                 Unavailable

 

                    Bhupinder Louise    PreferredProvider    Unavailable







Allergies and Adverse Reactions







                    Name                Reaction            Notes

 

                    NO KNOWN DRUG ALLERGIES                         







Plan of Treatment







             Planned Activity    Comments     Planned Date    Planned Time    Plan/Goal

 

             Injection,Subcutaneous/Intramuscul, [facility]                 2017    12:00 AM      

 

             [facility]                       2017    12:00 AM      







Medications







                                        Active 

 

             Name         Start Date    Estimated Completion Date    SIG          Comments

 

                Latuda 20 mg oral tablet                                    take 1 tablet (20 mg) by oral route once daily with

 food (at least 350 calories)            

 

             pravastatin 40 mg oral tablet    3/30/2015                 TAKE 1 TABLET BY MOUTH DAILY     

 

                Namenda XR 28 mg oral capsule,sprinkle,ER 24hr    2015                       take 1 capsule (28

 mg) by oral route once daily            

 

                Namenda XR 28 mg oral capsule,sprinkle,ER 24hr    2016                       take 1 capsule (28

 mg) by oral route once daily            

 

                potassium chloride 10 mEq oral tablet extended release    2016                       take 1 tablet

 (10 meq) by oral route once daily       

 

             pravastatin 40 mg oral tablet    2016                 TAKE 1 TABLET BY MOUTH DAILY     

 

                Vitamin B-12 1,000 mcg/mL injection solution    2016                       inject 1 milliliter 

(1,000 mcg) by intramuscular route once a month     

 

                potassium chloride 10 mEq oral tablet extended release    2016                      take 1 tablet

 (10 meq) by oral route once daily       

 

                Namenda XR 28 mg oral capsule,sprinkle,ER 24hr    2016                      TAKE 1 CAPSULE BY

 MOUTH EVERY DAY                         

 

                furosemide 40 mg oral tablet    2016                      take 1 tablet (40 mg) by oral route

 once daily                              

 

                mirtazapine 45 mg oral tablet                                    take 1 tablet (45 mg) by oral route once daily

 before bedtime                          

 

             Fish Oil 300-1,000 mg oral capsule                              take 1 capsule by oral route daily     

 

             Vitamin D3 1,000 unit oral tablet                              take 1 tablet by oral route daily     

 

                Calcium 600 600 mg calcium (1,500 mg) oral tablet                                    take 1 tablet by oral route

 daily                                   

 

                Aspirin Low Dose 81 mg oral tablet,delayed release (DR/EC)                                    take 1 tablet 

(81 mg) by oral route once daily         

 

                metoclopramide HCl 10 mg oral tablet    2017                       take 1 tablet by oral route 

2 times a day for 50 days                

 

             furosemide 40 mg oral tablet    2017                 TAKE 1 TABLET BY MOUTH DAILY     

 

                Namenda XR 28 mg oral capsule,sprinkle,ER 24hr    2017                       TAKE 1 CAPSULE BY 

MOUTH EVERY DAY                          

 

                Linzess 72 mcg oral capsule    2017                       take 1 capsule (72 mcg) by oral route

 once daily on an empty stomach at least 30 minutes before 1st meal of the day     



 

             pravastatin 40 mg oral tablet    2017                 TAKE 1 TABLET BY MOUTH DAILY     

 

                potassium chloride 10 mEq oral tablet extended release    2017                       TAKE 2 TABLETs

 BY MOUTH twice DAILY for one week       









                                         

 

             Name         Start Date    Expiration Date    SIG          Comments

 

             Reglan 10mg    3/29/2010    2010    one ac and hs     

 

                Keflex 500 mg oral capsule    2010       10/1/2010       take 1 capsule (500 mg) by oral

 route every 6 hours for 10 days         

 

                Bactrim -160 mg oral tablet    2011       take 1 tablet by oral route

 every 12 hours for 7 days               

 

                triamcinolone acetonide 0.1 % topical cream    2011      apply a thin

 layer to the affected area(s) by topical route 2 times per day     

 

                sertraline 100 mg oral tablet    4/10/2012       5/10/2012       take 1.5 tablets by oral route

 daily for 30 days                       

 

                ergocalciferol (vitamin D2) 50,000 unit oral capsule    4/15/2013       2013       TAKE

 ONE CAPSULE BY MOUTH ONCE A WEEK        

 

                CYANOCOBALAM 1000MCGINJ 1000 milliliter    2013       INJECT 1ML INTRAMUSCULAR

 ONCE A MONTH                            

 

                pravastatin 40 mg oral tablet    3/25/2014       3/20/2015       TAKE ONE TABLET BY MOUTH EVERY

 DAY                                     

 

                          Zostavax (PF) 19,400 unit/0.65 mL subcutaneous suspension for reconstitution    3/23/2015

                    3/24/2015           inject 0.65 milliliter by subcutaneous route once     

 

                famciclovir 500 mg oral tablet    12/3/2015       12/10/2015      take 1 tablet (500 mg) by

 oral route every 8 hours for 7 days     

 

                furosemide 40 mg oral tablet    2016      take 1 tablet (40 mg) by oral

 route once daily                        

 

                Cipro 500 mg oral tablet    2016       take 1 tablet (500 mg) by oral route

 2 times per day for 5 days              

 

                Bactrim -160 mg oral tablet    2016        take 1 tablet by oral route

 every 12 hours for 7 days               

 

                metoclopramide HCl 10 mg oral tablet    2017       take 1 tablet by oral

 route 2 times a day for 50 days         

 

                Macrobid 100 mg oral capsule    2017       take 1 capsule (100 mg) by oral

 route 2 times per day with food for 7 days     

 

                Augmentin 875-125 mg oral tablet    2017       take 1 tablet by oral route

 every 12 hours for 7 days               

 

                amoxicillin 500 mg oral tablet    2017       take 1 tablet (500 mg) by oral

 route every 12 hours for 7 days         

 

                cefuroxime axetil 500 mg oral tablet    2017       take 1 tablet (500 mg)

 by oral route 2 times per day for 7 days     

 

                ciprofloxacin HCl 500 mg oral tablet    2017       take 1 tablet (500 mg)

 by oral route every 12 hours for 5 days     









                                        Discontinued 

 

             Name         Start Date    Discontinued Date    SIG          Comments

 

                Tylenol 325 mg oral tablet                    2013        take 1 - 2 tablets (325 -650 mg) by oral

 route every 4-6 hours as needed         

 

                Calcium 600 + D(3) 600 mg(1,500mg) -400 unit oral tablet                    2011       take 1 tablet

 by oral route 2 times a day            no longer taking

 

                Vitamin B-12 1,000 mcg oral tablet extended release    2010       take 1

 tablet by oral route daily             no longer taking

 

                Antifungal (clotrimazole) 1 % topical cream    2010       apply to the 

affected and surrounding areas of skin by topical route 2 times per day morning 
and evening                              

 

                sertraline 100 mg oral tablet    5/10/2011       2011       take 2 tablets (200 mg) by 

oral route once daily                   discontinued by Dr. Serrano

 

                mirtazapine 15 mg oral tablet                    2011        take 1 tablet (15 mg) by oral route 

once daily before bedtime               Dr. Serrano

 

                mirtazapine 15 mg oral tablet                    2011        take 1 tablet (15 mg) by oral route 

once daily before bedtime               dc'd by Dr. Serrano

 

                Pristiq 50 mg oral tablet extended release 24 hr                    2013        take 1 tablet (50

 mg) by oral route once daily           Dr. Serrano

 

                Pristiq 50 mg oral tablet extended release 24 hr                    2013        take 1 tablet (50

 mg) by oral route once daily           dose updated

 

                Vitamin B-12 1,000 mcg/mL injection solution    2011        inject 1 milliliter

 (1,000 mcg) by intramuscular route once a month    on list already

 

                    syringe with needle 1 mL 25 gauge x 1" miscellaneous syringe    2011

                          use for injection once a month     

 

                clotrimazole 1 % topical cream    2011        apply to the affected and surrounding

 areas of skin by topical route 2 times per day in the morning and evening     

 

                Vitamin D2 50,000 unit oral capsule    2011        take 1 capsule (50,000

 unit) by oral route once weekly        generic on list

 

                Pravachol 40 mg oral tablet    2012        take 1 tablet (40 mg) by oral 

route once daily for 90 days            generic on list

 

                lithium carbonate 300 mg oral capsule    2012        take 1 capsule by oral

 route daily                            dose updated

 

                Pristiq 100 mg oral tablet extended release 24 hr                    4/10/2012       take 1 and 1/2 

tablet (150 mg) by oral route once daily    Mental Health provider

 

                Pristiq 100 mg oral tablet extended release 24 hr                    4/10/2012       take 1 and 1/2 

tablet (150 mg) by oral route once daily    Discontinued by [facility]

 

                hydroxyzine HCl 50 mg oral tablet    10/16/2014      2015       take 1 tablet (50 mg) 

by oral route at bedtime                 

 

                lithium carbonate 300 mg oral capsule    2015       take 1 capsule (300

 mg) by oral route 2 for 30 days         

 

                fluconazole 100 mg oral tablet    2015       12/3/2015       take 1 tablet (100 mg) by 

oral route once a week                   

 

                ketoconazole 2 % topical cream    2015       12/3/2015       apply to the affected area(s)

 by topical route 2 times per day        

 

                prednisone 10 mg oral tablet    12/3/2015       2016        take 2 tablets (20 mg) by oral

 route once daily for 4 days 1 tablet daily for 4 days 0.5 tablet daily for 4 
days                                     

 

                triamcinolone acetonide 0.1 % topical cream    2016       apply a thin layer

 to the affected area(s) by topical route 2 times per day     

 

                Cipro 500 mg oral tablet    1/15/2017       2017       take 1 tablet (500 mg) by oral route

 every 12 hours for 10 days              







Problem List







                    Description         Status              Onset

 

                    Artificial opening status; colostomy    Active               

 

                    Bipolar disorder, unspecified    Active               

 

                    Hyperlipidemia      Active               

 

                    Peritoneal Neoplasm, Malignant    Active               

 

                    Anemia, Pernicious    Active               

 

                    Arthritis unspecified    Active               

 

                    B12 deficiency      Active               







Vital Signs







      Date    Time    BP-Sys(mm[Hg]    BP-Lynn(mm[Hg])    HR(bpm)    RR(rpm)    Temp    WT    HT    HC    BMI

                    BSA                 BMI Percentile      O2 Sat(%)

 

        2017    1:34:00 PM    118 mmHg    62 mmHg    122 bpm    18 rpm    97.8 F    161.375 lbs    

69 in                     23.83 kg/m2    1.89 m2                   96 %

 

        2017    3:05:00 PM    134 mmHg    70 mmHg    70 bpm    20 rpm    97.4 F    181 lbs    69 in

                          26.7288 kg/m    1.9992 m                 98 %

 

        2017    11:07:00 AM    124 mmHg    64 mmHg    62 bpm    17 rpm    98.2 F    181.2 lbs    69

 in                       26.76 kg/m2    2.00 m2                   98 %

 

        1/15/2017    3:34:00 PM    148 mmHg    89 mmHg    69 bpm    20 rpm    98.2 F    179 lbs    69 in

                          26.4334 kg/m    1.9882 m                 98 %

 

       2017    1:51:00 PM    160 mmHg    90 mmHg    100 bpm    20 rpm    96.5 F    179 lbs             

                                                                98 %

 

       2016    3:11:00 PM    134 mmHg    76 mmHg    80 bpm    20 rpm    98 F    163 lbs    69 in     

                24.0706 kg/m    1.8972 m                      98 %

 

        2016    2:04:00 PM    142 mmHg    86 mmHg    68 bpm    16 rpm    98.5 F    166 lbs    63 in

                          29.41 kg/m2    1.83 m2                   100 %

 

        2016    11:27:00 AM    148 mmHg    78 mmHg    90 bpm    20 rpm    98.2 F    153 lbs    69 in

                          22.5939 kg/m    1.8381 m                 96 %

 

        12/3/2015    9:50:00 AM    132 mmHg    70 mmHg    62 bpm    16 rpm    97.9 F    145 lbs    69 in

                          21.41 kg/m2    1.79 m2                   100 %

 

        2015    8:52:00 AM    132 mmHg    68 mmHg    52 bpm    20 rpm    97.8 F    141 lbs    69 in

                          20.8218 kg/m    1.7645 m                 100 %

 

        2015    3:25:00 PM    120 mmHg    62 mmHg    72 bpm    16 rpm    98.1 F    136 lbs    69 in

                          20.08 kg/m2    1.73 m2                   98 %

 

       3/23/2015    2:55:00 PM    130 mmHg    76 mmHg    68 bpm    18 rpm    97 F    140 lbs    69 in    

                20.6742 kg/m    1.7583 m                      98 %

 

        10/16/2014    11:11:00 AM    120 mmHg    66 mmHg    77 bpm    20 rpm    98 F    130 lbs    69 in

                          19.20 kg/m2    1.69 m2                   100 %

 

        2014    3:21:00 PM    130 mmHg    66 mmHg    63 bpm    18 rpm    97.2 F    160 lbs    69 in

                          23.6276 kg/m    1.8797 m                 99 %

 

        2013    10:35:00 AM    132 mmHg    70 mmHg    66 bpm    20 rpm    98.1 F    157 lbs    69 in

                          23.18 kg/m2    1.86 m2                    

 

        2013    1:29:00 PM    132 mmHg    70 mmHg    76 bpm    18 rpm    98.2 F    166 lbs    69 in 

                          24.5137 kg/m    1.9146 m                  

 

       2013    2:46:00 PM    128 mmHg    70 mmHg    76 bpm    16 rpm    98 F    160 lbs    69 in     

                23.63 kg/m2     1.88 m2                          

 

        2011    8:49:00 AM    128 mmHg    78 mmHg    70 bpm    18 rpm    97.9 F    164 lbs    69 in

                          24.2183 kg/m    1.903 m                  

 

     2011    1:31:00 PM    132 mmHg    68 mmHg    84 bpm         97 F    167 lbs                        

                                         

 

        2011    9:09:00 AM    128 mmHg    70 mmHg    72 bpm    18 rpm    98.2 F    163 lbs    64 in 

                          27.9786 kg/m    1.8272 m                  

 

       2011    10:01:00 AM    132 mmHg    70 mmHg    72 bpm    18 rpm    98.2 F    154 lbs             

                                                                 

 

       2011    2:47:00 PM    128 mmHg    70 mmHg    72 bpm    18 rpm    97.8 F    156 lbs             

                                                                 

 

       5/10/2011    3:16:00 PM    144 mmHg    80 mmHg    72 bpm    18 rpm    98.2 F    158 lbs             

                                                                 

 

        2011    10:11:00 AM    132 mmHg    70 mmHg    70 bpm    18 rpm    98.2 F    168 lbs    69 in

                          24.809 kg/m    1.9261 m                  

 

        4/15/2011    10:52:00 AM    110 mmHg    60 mmHg    75 bpm    16 rpm    97.5 F    172.375 lbs    

69 in                     25.46 kg/m2    1.95 m2                   100 %

 

        2011    11:43:00 AM    120 mmHg    82 mmHg    75 bpm    16 rpm    97.2 F    178.5 lbs    69

 in                       26.3596 kg/m    1.9854 m                 100 %

 

        10/15/2010    1:32:00 PM    120 mmHg    70 mmHg    80 bpm    18 rpm    96.6 F    177 lbs    69 in

                          26.14 kg/m2    1.98 m2                   100 %

 

        2010    3:50:00 PM    168 mmHg    100 mmHg    82 bpm    18 rpm    97.8 F    177.5 lbs    69

 in                       26.2119 kg/m    1.9798 m                 97 %

 

        2010    1:21:00 PM    140 mmHg    80 mmHg    59 bpm    16 rpm    97.6 F    173.25 lbs    69 

in                        25.58 kg/m2    1.96 m2                   100 %

 

        2010    3:02:00 PM    140 mmHg    80 mmHg    61 bpm    16 rpm    97.6 F    173.125 lbs    69

 in                       25.5658 kg/m    1.9553 m                 99 %

 

        2010    1:23:00 PM    130 mmHg    80 mmHg    66 bpm    16 rpm    96.8 F    173 lbs    69 in 

                          25.55 kg/m2    1.95 m2                   100 %

 

        2010    12:58:00 PM    130 mmHg    88 mmHg    75 bpm    16 rpm    98.4 F    172.25 lbs    69

 in                       25.4366 kg/m    1.9503 m                 100 %







Social History







                    Name                Description         Comments

 

                    denies alcohol use                         

 

                    denies smoking                           

 

                    Denies illicit substance abuse                         

 

                    retired                                 direct care

 

                    Single                                   

 

                    Exercises regularly                         

 

                    Attended some college                         

 

                    Tobacco             Never smoker         







History of Procedures







                    Date Ordered        Description         Order Status

 

                    2010 12:00 AM    COMPREHEN METABOLIC PANEL    Reviewed

 

                    2010 12:00 AM    COMPLETE CBC W/AUTO DIFF WBC    Reviewed

 

                    2010 12:00 AM    LIPID PANEL         Reviewed

 

                          2015 12:00 AM        B12 Injection, Up to 1000 Mcg NDC#9488-5547-30 [facility] 

                                        Reviewed

 

                    2011 12:00 AM    MAMMOGRAM SCREENING    Reviewed

 

                    2011 12:00 AM    CYTOPATH C/V THIN LAYER    Reviewed

 

                    2011 12:00 AM    B12 Injection 1 cc NDC#[address]-50    Reviewed

 

                    2015 12:00 AM    THER/PROPH/DIAG INJ SC/IM    Reviewed

 

                    2015 12:00 AM    B12 Injection, Up to 1000 Mcg NDC#7774-2759-76    Reviewed

 

                    2011 12:00 AM    THER/PROPH/DIAG INJ SC/IM    Reviewed

 

                    2011 12:00 AM    B12 Injection([place]) Ndc#9301-4327-09-    Reviewed

 

                    2015 12:00 AM    THER/PROPH/DIAG INJ SC/IM    Reviewed

 

                    2015 12:00 AM    B12 Injection, Up to 1000 Mcg NDC#6252-1618-05    Reviewed

 

                    10/20/2011 12:00 AM    THER/PROPH/DIAG INJ SC/IM    Reviewed

 

                    10/20/2011 12:00 AM    B12 Injection([place]) Ndc#4169-4575-19-    Reviewed

 

                    2016 12:00 AM    THER/PROPH/DIAG INJ SC/IM    Reviewed

 

                    2016 12:00 AM    B12 Injection, Up to 1000 Mcg NDC#5943-9056-36    Reviewed

 

                    3/14/2016 12:00 AM    VITAMIN B-12        Reviewed

 

                    3/15/2016 12:00 AM    THER/PROPH/DIAG INJ SC/IM    Reviewed

 

                    3/15/2016 12:00 AM    B12 Injection, Up to 1000 Mcg NDC#4221-1153-25    Reviewed

 

                    2011 12:00 AM    ***Immunization administration, Medicare flu    Reviewed

 

                    2011 12:00 AM    Fluzone ** MEDICARE Only **    Reviewed

 

                    2011 12:00 AM    THER/PROPH/DIAG INJ SC/IM    Reviewed

 

                    2011 12:00 AM    B12 Injection ([employer] [facility]) Ndc#9824-9848-83    Reviewed

 

                    2016 12:00 AM    B12 Injection, Up to 1000 Mcg NDC#3491-5562-53 [facility]    

Reviewed

 

                    2016 12:00 AM    TTE W/DOPPLER COMPLETE    Reviewed

 

                    2016 12:00 AM    EXTREMITY STUDY     Reviewed

 

                          2016 12:00 AM        B12 Injection, Up to 1000 Mcg NDC#2291-6559-51 [facility] 

                                        Reviewed

 

                    2016 12:00 AM    THER/PROPH/DIAG INJ SC/IM    Reviewed

 

                    2016 12:00 AM    B12 Injection, Up to 1000 Mcg NDC#8464-2294-22    Reviewed

 

                    2016 12:00 AM    THER/PROPH/DIAG INJ SC/IM    Reviewed

 

                    2012 12:00 AM    B12 Injection([place]) Ndc#4794-2315-73-    Reviewed

 

                    2016 12:00 AM    B12 Injection, Up to 1000 Mcg NDC#8985-1495-66    Reviewed

 

                    2016 12:00 AM    THER/PROPH/DIAG INJ SC/IM    Reviewed

 

                    2012 12:00 AM    THER/PROPH/DIAG INJ SC/IM    Reviewed

 

                    2012 12:00 AM    B12 Injection ([employer]) Ndc#5844-7090-32    Reviewed

 

                    2016 12:00 AM    THER/PROPH/DIAG INJ SC/IM    Reviewed

 

                    2016 12:00 AM    B12 Injection, Up to 1000 Mcg NDC#9694-4631-51    Reviewed

 

                    2016 12:00 AM    B12 Injection, Up to 1000 Mcg NDC#0228-1477-12    Reviewed

 

                    2016 12:00 AM    THER/PROPH/DIAG INJ SC/IM    Reviewed

 

                    2012 12:00 AM    THER/PROPH/DIAG INJ SC/IM    Reviewed

 

                    2012 12:00 AM    B12 Injection([place]) Ndc#5485-4103-88-    Reviewed

 

                    12/15/2016 12:00 AM    B12 Injection, Up to 1000 Mcg NDC#5478-4948-09    Reviewed

 

                    12/15/2016 12:00 AM    THER/PROPH/DIAG INJ SC/IM    Reviewed

 

                    2016 12:00 AM    URNLS DIP STICK/TABLET RGNT AUTO W/O MICROSCOPY    Reviewed

 

                    1/3/2017 12:00 AM    URNLS DIP STICK/TABLET RGNT AUTO W/O MICROSCOPY    Reviewed

 

                    2017 12:00 AM    URINE CULTURE/COLONY COUNT    Reviewed

 

                    2017 12:00 AM    Rocephin 1 gram Injection, [facility]    Reviewed

 

                    2017 12:00 AM    THERAPEUTIC PROPHYLACTIC/DX INJECTION SUBQ/IM    Reviewed

 

                    2017 12:00 AM    B12 1000mcg Injection, [facility] [employer]    Reviewed

 

                    5/3/2012 12:00 AM    THER/PROPH/DIAG INJ SC/IM    Reviewed

 

                    5/3/2012 12:00 AM    B12 Injection([facility]) Ndc#5382-5602-52-    Reviewed

 

                    2017 12:00 AM    THERAPEUTIC PROPHYLACTIC/DX INJECTION SUBQ/IM    Reviewed

 

                    2017 12:00 AM    B12 1000mcg Injection, [facility]    Reviewed

 

                    2017 12:00 AM    MRI BRAIN STEM W/O & W/DYE    Reviewed

 

                    2017 12:00 AM    VITAMIN B-12        Reviewed

 

                    2017 12:00 AM    Speech Therapy Consult    Reviewed

 

                    2017 12:00 AM    ASSAY OF CREATININE    Reviewed

 

                    2012 12:00 AM    IMMUNOTHERAPY INJECTIONS    Reviewed

 

                    2012 12:00 AM    B12 Injection([facility]) Ndc#1994-80[address]-73-    Reviewed

 

                    2017 12:00 AM    URINALYSIS AUTO W/SCOPE    Reviewed

 

                    2012 12:00 AM    THER/PROPH/DIAG INJ SC/IM    Reviewed

 

                    2012 12:00 AM    B12 Injection, Up to 1000 Mcg NDC#3734-0143-01    Reviewed

 

                    2017 12:00 AM    URINALYSIS AUTO W/SCOPE    Reviewed

 

                    2017 2:18 PM    URINALYSIS AUTO W/O SCOPE    Reviewed

 

                    2017 12:00 AM    URINE CULTURE/COLONY COUNT    Reviewed

 

                    2017 12:00 AM    B12 1000mcg Injection    Reviewed

 

                    2017 12:00 AM    URNLS DIP STICK/TABLET RGNT AUTO W/O MICROSCOPY    Returned

 

                    2012 12:00 AM    THER/PROPH/DIAG INJ SC/IM    Reviewed

 

                    2012 12:00 AM    B12 Injection, Up to 1000 Mcg NDC#2527-4028-02    Reviewed

 

                    2012 12:00 AM    THER/PROPH/DIAG INJ SC/IM    Reviewed

 

                    2012 12:00 AM    B12 Injection, Up to 1000 Mcg NDC#4006-5774-32    Reviewed

 

                    10/16/2012 12:00 AM    THER/PROPH/DIAG INJ SC/IM    Reviewed

 

                    10/16/2012 12:00 AM    B12 Injection, Up to 1000 Mcg NDC#4660-1618-23    Reviewed

 

                    2010 12:00 AM    COMPREHEN METABOLIC PANEL    Reviewed

 

                    2010 12:00 AM    COMPLETE CBC W/AUTO DIFF WBC    Reviewed

 

                    2010 12:00 AM    LIPID PANEL         Reviewed

 

                    2013 12:00 AM    Flu Injection 3 Years And Above NDC# [address]-41  [facility]    Reviewed



 

                    2013 12:00 AM    COMPLETE CBC W/AUTO DIFF WBC    Reviewed

 

                    2013 12:00 AM    ASSAY OF LITHIUM    Reviewed

 

                    2013 12:00 AM    METABOLIC PANEL TOTAL CA    Reviewed

 

                    4/3/2013 12:00 AM    THER/PROPH/DIAG INJ SC/IM    Reviewed

 

                    4/3/2013 12:00 AM    B12 Injection, Up to 1000 Mcg NDC#6735-2260-40    Reviewed

 

                    2013 12:00 AM    THER/PROPH/DIAG INJ SC/IM    Reviewed

 

                    2013 12:00 AM    B12 Injection, Up to 1000 Mcg NDC#5570-8176-17    Reviewed

 

                    2013 12:00 AM    THER/PROPH/DIAG INJ SC/IM    Reviewed

 

                    2013 12:00 AM    B12 Injection, Up to 1000 Mcg NDC#6131-4723-00    Reviewed

 

                    2013 12:00 AM    LIPID PANEL         Reviewed

 

                    2013 12:00 AM    VITAMIN D 25 HYDROXY    Reviewed

 

                    2013 12:00 AM    THER/PROPH/DIAG INJ SC/IM    Reviewed

 

                    2013 12:00 AM    B12 Injection, Up to 1000 Mcg NDC#7030-9120-93    Reviewed

 

                    2013 12:00 AM    THER/PROPH/DIAG INJ SC/IM    Reviewed

 

                    3/6/2014 12:00 AM    THER/PROPH/DIAG INJ SC/IM    Reviewed

 

                    2014 12:00 AM    THER/PROPH/DIAG INJ SC/IM    Reviewed

 

                    2014 12:00 AM    B12 Injection, Up to 1000 Mcg NDC#8960-4855-45    Reviewed

 

                    2010 12:00 AM    SKIN FUNGI CULTURE    Reviewed

 

                    10/9/2010 12:00 AM    COMPREHEN METABOLIC PANEL    Reviewed

 

                    10/9/2010 12:00 AM    LIPID PANEL         Reviewed

 

                    2010 12:00 AM    THER/PROPH/DIAG INJ SC/IM    Reviewed

 

                    2010 12:00 AM    B12 Injection Ndc#[address]-57 ([facility])    Reviewed

 

                    2010 12:00 AM    THER/PROPH/DIAG INJ SC/IM    Reviewed

 

                    2010 12:00 AM    Kenalog 40 Mg Im-Nd#[address]-58 ([facility])    Reviewed

 

                    10/15/2010 12:00 AM    FLU VACCINE 3 YRS & > IM    Reviewed

 

                    10/15/2010 12:00 AM    Admin.Of M/C Cov.Vaccine-Flu Vacc.    Reviewed

 

                    1/15/2011 12:00 AM    COMPLETE CBC W/AUTO DIFF WBC    Reviewed

 

                    1/15/2011 12:00 AM    COMPREHEN METABOLIC PANEL    Reviewed

 

                    1/15/2011 12:00 AM    LIPID PANEL         Reviewed

 

                    2014 12:00 AM    MAMMOGRAM SCREENING    Reviewed

 

                    2014 12:00 AM    Screening mammography, bilateral    Reviewed

 

                    7/10/2014 12:00 AM    THER/PROPH/DIAG INJ SC/IM    Reviewed

 

                    7/10/2014 12:00 AM    B12 Injection, Up to 1000 Mcg NDC#6525-8490-36    Reviewed

 

                    2011 12:00 AM    COMPLETE CBC W/AUTO DIFF WBC    Reviewed

 

                    2011 12:00 AM    COMPREHEN METABOLIC PANEL    Reviewed

 

                    2011 12:00 AM    LIPID PANEL         Reviewed

 

                    2014 12:00 AM    B12 Injection, Up to 1000 Mcg NDC#7998-3473-31    Reviewed

 

                    10/19/2014 12:00 AM    MAMMOGRAM SCREENING    Reviewed

 

                    10/19/2014 12:00 AM    Screening mammography, bilateral    Reviewed

 

                    10/16/2014 12:00 AM    B12 Injection, Up to 1000 Mcg NDC#0588-7815-20    Reviewed

 

                    10/16/2014 12:00 AM    COMPLETE CBC W/AUTO DIFF WBC    Reviewed

 

                    10/16/2014 12:00 AM    COMPREHEN METABOLIC PANEL    Reviewed

 

                    10/16/2014 12:00 AM    IMMUNOASSAY TUMOR     Reviewed

 

                    10/16/2014 12:00 AM    LIPID PANEL         Reviewed

 

                    10/16/2014 12:00 AM    ASSAY OF LITHIUM    Reviewed

 

                    10/16/2014 12:00 AM    MAMMOGRAM SCREENING    Reviewed

 

                    2011 12:00 AM    ASSAY OF PARATHORMONE    Reviewed

 

                    2011 12:00 AM    VITAMIN D 25 HYDROXY    Reviewed

 

                    2011 12:00 AM    ASSAY OF LITHIUM    Reviewed

 

                    2011 12:00 AM    METABOLIC PANEL TOTAL CA    Reviewed

 

                    2011 12:00 AM    CT HEAD/BRAIN W/O & W/DYE    Reviewed

 

                    3/23/2015 12:00 AM    PNEUMOCOCCAL VACC 13 GLENDY IM    Reviewed

 

                    3/23/2015 12:00 AM    Vitamin B12 injection    Reviewed

 

                    2011 12:00 AM    ASSAY OF LITHIUM    Reviewed

 

                    2011 12:00 AM    B12 Injection Ndc#[address]-27  [facility]    Reviewed

 

                    2015 12:00 AM    THER/PROPH/DIAG INJ SC/IM    Reviewed

 

                    2015 12:00 AM    B12 Injection, Up to 1000 Mcg NDC#9447-3365-92    Reviewed

 

                    2015 12:00 AM    COMPLETE CBC W/AUTO DIFF WBC    Reviewed

 

                    2015 12:00 AM    COMPREHEN METABOLIC PANEL    Reviewed

 

                    2015 12:00 AM    LIPID PANEL         Reviewed

 

                    2015 12:00 AM    ASSAY OF LITHIUM    Reviewed

 

                    2011 12:00 AM    VIT D 1 25-DIHYDROXY    Reviewed

 

                    2011 12:00 AM    VITAMIN B-12        Reviewed

 

                    2015 12:00 AM    B12 Injection, Up to 1000 Mcg NDC#7134-3218-65    Reviewed

 

                    2015 12:00 AM    THER/PROPH/DIAG INJ SC/IM    Reviewed

 

                    2015 12:00 AM    B12 Injection, Up to 1000 Mcg NDC#7719-5459-88    Reviewed

 

                    2011 12:00 AM    THER/PROPH/DIAG INJ SC/IM    Reviewed

 

                    2011 12:00 AM    B12 Injection ([facility]) Ndc#6686-0178-79    Reviewed

 

                    2015 12:00 AM    THER/PROPH/DIAG INJ SC/IM    Reviewed

 

                    2015 12:00 AM    B12 Injection, Up to 1000 Mcg NDC#2816-6515-07    Reviewed







Results Summary







                          Date and Description      Results

 

                          2004 12:00 AM        Colonoscopy-Women and Men over 50 Normal 

 

                          2008 12:00 AM         Pap Smear Declined 

 

                          10/7/2009 12:00 AM        Cholest Cry Stone Ql .0 %LDLc SerPl-mCnc 123.0 mg/dLHDLc

 SerPl-mCnc 34.0 mg/dLTrigl SerPl-mCnc 190.0 mg/dLGlucose SerPl-mCnc 78.0 mg/dL

 

                          2009 12:00 AM        Mammogram -Women over 40 Normal HIV1+2 Ab Ser Ql no risk 

 

                          2010 8:47 AM         Dexa Bone Scan Refused Aspirin reccommended Contraindication 



 

                          2010 8:48 AM         Depression Done 

 

                          2010 12:00 AM         Foot Exam-Diabetic Done 

 

                          2010 12:00 AM         Cholest Cry Stone Ql .0 %LDLc SerPl-mCnc 126.0 mg/dLGlucose

 SerPl-mCnc 102.0 mg/dL

 

                          2010 8:45 AM          TRIGLYCERIDES 122.0 mg/dLCHOLESTEROL 186.0 mg/dLHDL 36.0 mg/dLTOT

 CHOL/HDL 5.2 LDL (CALC) 126.0 mg/dLGLUCOSE 102.0 mg/dLSODIUM 143.0 
mmol/LPOTASSIUM 3.70 mmol/LCHLORIDE 111.0 mmol/LCO2 23.0 mmol/LBUN 10.0 
mg/dLCREATININE 0.80 mg/dLSGOT/AST 12.0 IU/LSGPT/ALT 11.0 IU/LALK PHOS 65.0 
IU/LTOTAL PROTEIN 7.20 g/dLALBUMIN 3.90 g/dLTOTAL BILI 0.50 mg/dLCALCIUM 10.20 
mg/dLAGE 59 GFR NonAA 73 GFR AA 88 eGFR >60 mL/min/1.73 m2eGFR AA* >60 WBC 5.7 
RBC 3.26 HGB 10.60 g/dLHCT 31.70 %MCV 97.0 fLMCH 32.50 pgMCHC 33.40 g/dLRDW SD 
47 RDW CV 13.30 %MPV 9.70 fLPLT 287 NRBC# 0.00 NRBC% 0.0 %NEUT 62.90 %%LYMP 
21.80 %%MONO 9.90 %%EOS 5.0 %%BASO 0.40 %#NEUT 3.56 #LYMP 1.23 #MONO 0.56 #EOS 
0.28 #BASO 0.02 MANUAL DIFF NOT IND 

 

                          2010 12:00 AM        Glucose SerPl-mCnc 96.0 mg/dLCholest Cry Stone Ql .0 %LDLc

 SerPl-mCnc 146.0 mg/dL

 

                          2010 8:26 AM         TRIGLYCERIDES 106.0 mg/dLCHOLESTEROL 199.0 mg/dLHDL 32.0 mg/dLTOT

 CHOL/HDL 6.2 LDL (CALC) 146.0 mg/dLGLUCOSE 96.0 mg/dLSODIUM 143.0 
mmol/LPOTASSIUM 4.0 mmol/LCHLORIDE 113.0 mmol/LCO2 24.0 mmol/LBUN 13.0 
mg/dLCREATININE 1.0 mg/dLSGOT/AST 11.0 IU/LSGPT/ALT 6.0 IU/LALK PHOS 56.0 
IU/LTOTAL PROTEIN 6.60 g/dLALBUMIN 3.80 g/dLTOTAL BILI 0.50 mg/dLCALCIUM 9.30 
mg/dLAGE 59 GFR NonAA 57 GFR AA 69 eGFR 57 eGFR AA* >60 

 

                          10/6/2010 12:00 AM        Cholest Cry Stone Ql .0 %LDLc SerPl-mCnc 111.0 mg/dLGlucose

 SerPl-mCnc 81.0 mg/dL

 

                          10/6/2010 2:45 PM         TRIGLYCERIDES 123.0 mg/dLCHOLESTEROL 178.0 mg/dLHDL 42.0 mg/dLTOT

 CHOL/HDL 4.2 LDL (CALC) 111.0 mg/dLGLUCOSE 81.0 mg/dLSODIUM 139.0 
mmol/LPOTASSIUM 4.10 mmol/LCHLORIDE 106.0 mmol/LCO2 24.0 mmol/LBUN 13.0 
mg/dLCREATININE 0.90 mg/dLSGOT/AST 13.0 IU/LSGPT/ALT 11.0 IU/LALK PHOS 61.0 
IU/LTOTAL PROTEIN 7.10 g/dLALBUMIN 3.90 g/dLTOTAL BILI 0.30 mg/dLCALCIUM 9.30 
mg/dLAGE 60 GFR NonAA 64 GFR AA 78 eGFR >60 mL/min/1.73 m2eGFR AA* >60 WBC 6.9 
RBC 3.59 HGB 11.50 g/dLHCT 35.30 %MCV 98.0 fLMCH 32.0 pgMCHC 32.60 g/dLRDW SD 46
 RDW CV 12.90 %MPV 9.90 fLPLT 311 NRBC# 0.00 NRBC% 0.0 %NEUT 64.90 %%LYMP 22.50 
%%MONO 7.20 %%EOS 5.10 %%BASO 0.30 %#NEUT 4.45 #LYMP 1.54 #MONO 0.49 #EOS 0.35 
#BASO 0.02 MANUAL DIFF NOT IND 

 

                          2011 12:00 AM         Mammogram -Women over 40 Ordered 

 

                          2011 10:25 AM        TRIGLYCERIDES 111.0 mg/dLCHOLESTEROL 195.0 mg/dLHDL 43.0 mg/dLTOT

 CHOL/HDL 4.5 LDL (CALC) 130.0 mg/dLWBC 5.3 RBC 3.76 HGB 12.0 g/dLHCT 37.80 %MCV
 101.0 fLMCH 31.90 pgMCHC 31.70 g/dLRDW SD 47 RDW CV 13.0 %MPV 9.70 fLPLT 259 
NRBC# 0.00 NRBC% 0.0 %NEUT 69.0 %%LYMP 17.60 %%MONO 8.30 %%EOS 4.70 %%BASO 0.40 
%#NEUT 3.63 #LYMP 0.93 #MONO 0.44 #EOS 0.25 #BASO 0.02 MANUAL DIFF NOT IND 
GLUCOSE 102.0 mg/dLSODIUM 146.0 mmol/LPOTASSIUM 4.20 mmol/LCHLORIDE 113.0 
mmol/LCO2 23.0 mmol/LBUN 15.0 mg/dLCREATININE 1.0 mg/dLSGOT/AST 12.0 
IU/LSGPT/ALT 17.0 IU/LALK PHOS 60.0 IU/LTOTAL PROTEIN 6.90 g/dLALBUMIN 4.20 
g/dLTOTAL BILI 0.40 mg/dLCALCIUM 9.70 mg/dLAGE 60 GFR NonAA 57 GFR AA 69 eGFR 57
 eGFR AA* >60 

 

                          2011 11:49 AM        Cholest Cry Stone Ql .0 %LDLc SerPl-mCnc 130.0 mg/dLHDLc

 SerPl-mCnc 43.0 mg/dLTrigl SerPl-mCnc 111.0 mg/dLGlucose SerPl-mCnc 102.0 mg/dL

 

                          2011 11:52 AM        Pap Smear Declined 

 

                          2011 11:28 AM        Lithium 2.080 mmol/LGLUCOSE 102.0 mg/dLSODIUM 135.0 mmol/LPOTASSIUM

 3.90 mmol/LCHLORIDE 106.0 mmol/LCO2 21.0 mmol/LBUN 12.0 mg/dLCREATININE 1.30 
mg/dLCALCIUM 10.70 mg/dLAGE 60 GFR NonAA 42 GFR AA 51 eGFR 42 eGFR AA* 51 

 

                          2011 8:58 AM          Lithium 0.690 mmol/L

 

                          2011 2:38 PM         VITAMIN B12 3483.0 pg/mL

 

                          2013 3:35 PM          WBC 5.1 RBC 3.73 HGB 11.70 g/dLHCT 36.40 %MCV 98.0 fLMCH 31.40

 pgMCHC 32.10 g/dLRDW SD 47 RDW CV 13.10 %MPV 9.80 fLPLT 224 NRBC# 0.00 NRBC% 
0.0 %NEUT 66.80 %%LYMP 19.10 %%MONO 9.0 %%EOS 4.90 %%BASO 0.20 %#NEUT 3.42 #LYMP
 0.98 #MONO 0.46 #EOS 0.25 #BASO 0.01 MANUAL DIFF NOT IND GLUCOSE 88.0 
mg/dLSODIUM 141.0 mmol/LPOTASSIUM 4.10 mmol/LCHLORIDE 110.0 mmol/LCO2 22.0 
mmol/LBUN 22.0 mg/dLCREATININE 1.10 mg/dLCALCIUM 9.80 mg/dLAGE 62 GFR NonAA 50 
GFR AA 61 eGFR 50 eGFR AA* 60 Lithium 0.760 mmol/L

 

                          2013 11:02 AM        TRIGLYCERIDES 106.0 mg/dLCHOLESTEROL 181.0 mg/dLHDL 46.0 mg/dLTOT

 CHOL/HDL 3.9 LDL (CALC) 114.0 mg/dLVITAMIN D 41.10 ng/mL

 

                          10/17/2014 10:10 AM       WBC 5.0 RBC 3.66 HGB 11.60 g/dLHCT 36.80 %.0 fLMCH 31.70

 pgMCHC 31.50 g/dLRDW SD 50 RDW CV 13.50 %MPV 10.10 fLPLT 209 NRBC# 0.00 NRBC% 
0.0 %NEUT 69.20 %%LYMP 21.0 %%MONO 6.40 %%EOS 3.20 %%BASO 0.20 %#NEUT 3.46 #LYMP
 1.05 #MONO 0.32 #EOS 0.16 #BASO 0.01 MANUAL DIFF NOT IND GLUCOSE 100.0 
mg/dLSODIUM 148.0 mmol/LPOTASSIUM 3.90 mmol/LCHLORIDE 114.0 mmol/LCO2 26.0 
mmol/LBUN 12.0 mg/dLCREATININE 1.20 mg/dLSGOT/AST 9.0 IU/LSGPT/ALT <6 IU/LALK 
PHOS 82.0 IU/LTOTAL PROTEIN 6.90 g/dLALBUMIN 4.0 g/dLTOTAL BILI 0.40 
mg/dLCALCIUM 10.50 mg/dLAGE 64 GFR NonAA 45 GFR AA 55 eGFR 45 eGFR AA* 55 
TRIGLYCERIDES 96.0 mg/dLCHOLESTEROL 155.0 mg/dLHDL 38.0 mg/dLTOT CHOL/HDL 4.1 
LDL (CALC) 98.0 mg/dLLithium 0.850 mmol/LCancer Antigen (CA) 125 8.30 U/mL

 

                          2015 10:25 AM        Lithium 0.790 mmol/LWBC 4.8 RBC 3.44 HGB 11.0 g/dLHCT 35.20 

%.0 fLMCH 32.0 pgMCHC 31.30 g/dLRDW SD 53 RDW CV 14.0 %MPV 9.30 fLPLT 210
 NRBC# 0.00 NRBC% 0.0 %NEUT 70.80 %%LYMP 17.20 %%MONO 8.10 %%EOS 3.50 %%BASO 
0.40 %#NEUT 3.41 #LYMP 0.83 #MONO 0.39 #EOS 0.17 #BASO 0.02 MANUAL DIFF NOT IND 
TRIGLYCERIDES 107.0 mg/dLCHOLESTEROL 174.0 mg/dLHDL 43.0 mg/dLTOT CHOL/HDL 4.0 
LDL (CALC) 110.0 mg/dLGLUCOSE 90.0 mg/dLSODIUM 145.0 mmol/LPOTASSIUM 3.80 
mmol/LCHLORIDE 115.0 mmol/LCO2 24.0 mmol/LBUN 17.0 mg/dLCREATININE 1.30 
mg/dLSGOT/AST 18.0 IU/LSGPT/ALT 17.0 IU/LALK PHOS 56.0 IU/LTOTAL PROTEIN 6.70 
g/dLALBUMIN 3.90 g/dLTOTAL BILI 0.40 mg/dLCALCIUM 9.80 mg/dLAGE 64 GFR NonAA 41 
GFR AA 50 eGFR 41 eGFR AA* 50 

 

                          2015 8:50 AM        WBC 5.8 RBC 3.29 HGB 10.70 g/dLHCT 34.0 %.0 fLMCH 32.50

 pgMCHC 31.50 g/dLRDW SD 52 RDW CV 13.60 %MPV 9.60 fLPLT 223 NRBC# 0.00 NRBC% 
0.0 %NEUT 69.60 %%LYMP 18.90 %%MONO 8.50 %%EOS 2.80 %%BASO 0.20 %#NEUT 4.03 
#LYMP 1.09 #MONO 0.49 #EOS 0.16 #BASO 0.01 MANUAL DIFF NOT IND Lithium 0.620 
mmol/LGLUCOSE 83.0 mg/dLSODIUM 139.0 mmol/LPOTASSIUM 3.90 mmol/LCHLORIDE 109.0 
mmol/LCO2 22.0 mmol/LBUN 19.0 mg/dLCREATININE 1.40 mg/dLSGOT/AST 19.0 
IU/LSGPT/ALT 21.0 IU/LALK PHOS 55.0 IU/LTOTAL PROTEIN 6.50 g/dLALBUMIN 3.90 
g/dLTOTAL BILI 0.50 mg/dLCALCIUM 9.60 mg/dLAGE 65 GFR NonAA 38 GFR AA 46 eGFR 38
 eGFR AA* 46 TRIGLYCERIDES 121.0 mg/dLCHOLESTEROL 192.0 mg/dLHDL 51.0 mg/dLTOT 
CHOL/HDL 3.8 .0 mg/dLFREE T4 0.79 TSH 1.210 uIU/mLHemoglobin A1c 5.40 
%Estim. Avg Glu (eAG) 108 

 

                          3/15/2016 8:08 AM         VITAMIN B12 696.0 pg/mL

 

                          3/23/2016 8:26 AM         WBC 7.0 RBC 3.61 HGB 11.80 g/dLHCT 37.70 %.0 fLMCH 32.70

 pgMCHC 31.30 g/dLRDW SD 49 RDW CV 12.50 %MPV 10.0 fLPLT 207 NRBC# 0.00 NRBC% 
0.0 %NEUT 73.60 %%LYMP 16.40 %%MONO 6.60 %%EOS 3.0 %%BASO 0.30 %#NEUT 5.15 #LYMP
 1.15 #MONO 0.46 #EOS 0.21 #BASO 0.02 MANUAL DIFF NOT IND Lithium 0.940 
mmol/LGLUCOSE 108.0 mg/dLSODIUM 143.0 mmol/LPOTASSIUM 4.30 mmol/LCHLORIDE 110.0 
mmol/LCO2 27.0 mmol/LBUN 16.0 mg/dLCREATININE 1.60 mg/dLSGOT/AST 13.0 
IU/LSGPT/ALT 7.0 IU/LALK PHOS 71.0 IU/LTOTAL PROTEIN 6.80 g/dLALBUMIN 4.0 
g/dLTOTAL BILI 0.20 mg/dLCALCIUM 10.40 mg/dLAGE 65 GFR NonAA 32 GFR AA 39 eGFR 
32 eGFR AA* 39 TRIGLYCERIDES 113.0 mg/dLCHOLESTEROL 169.0 mg/dLHDL 42.0 mg/dLTOT
 CHOL/HDL 4.0 LDL (CALC) 104.0 mg/dLFREE T4 0.86 TSH 2.20 uIU/mLHemoglobin A1c 
5.20 %Estim. Avg Glu (eAG) 103 

 

                          3/25/2016 9:17 AM         COLOR YELLOW APPEARANCE CLEAR SPEC GRAV 1.010 pH 7.0 PROTEIN 

NEGATIVE GLUCOSE NEGATIVE mg/dLKETONE NEGATIVE BILIRUBIN NEGATIVE BLOOD NEGATIVE
 NITRITE NEGATIVE [place] SCREEN SMALL MICRO IND? SEE BELOW WBC/HPF 0-5 RBC/HPF 
NEGATIVE CASTS/LPF NEGATIVE /LPFCRYSTALS NEGATIVE MUCOUS THRDS NEGATIVE BACTERIA
 NEGATIVE EPITH CELLS FEW SQUAMOUS /HPFTRICHOMONAS NEGATIVE YEAST NEGATIVE 

 

                          2016 6:00 AM        GLUCOSE 91.0 mg/dLSODIUM 143.0 mmol/LPOTASSIUM 3.60 mmol/LCHLORIDE

 112.0 mmol/LCO2 23.0 mmol/LBUN 22.0 mg/dLCREATININE 1.20 mg/dLSGOT/AST 15.0 
IU/LSGPT/ALT 12.0 IU/LALK PHOS 61.0 IU/LTOTAL PROTEIN 5.40 g/dLALBUMIN 3.10 
g/dLTOTAL BILI 0.40 mg/dLCALCIUM 8.40 mg/dLAGE 66 GFR NonAA 45 GFR AA 55 eGFR 45
 eGFR AA* 55 WBC 3.0 RBC 3.05 HGB 9.80 g/dLHCT 32.10 %.0 fLMCH 32.10 
pgMCHC 30.50 g/dLRDW SD 54 RDW CV 14.20 %MPV 10.10 fLPLT 170 NRBC# 0.00 NRBC% 
0.0 %NEUT 50.70 %%LYMP 32.60 %%MONO 10.50 %%EOS 5.90 %%BASO 0.30 %#NEUT 1.54 
#LYMP 0.99 #MONO 0.32 #EOS 0.18 #BASO 0.01 MANUAL DIFF NOT IND 

 

                          2016 2:09 PM        COLOR YELLOW APPEARANCE CLEAR SPEC GRAV 1.010 pH 5.0 PROTEIN

 30 GLUCOSE NEGATIVE mg/dLKETONE NEGATIVE BILIRUBIN NEGATIVE BLOOD LARGE NITRITE
 NEGATIVE [place] SCREEN MODERATE MICRO INDICATED? SEE BELOW WBC/HPF  RBC/HPF
 20-50 CASTS/LPF NEGATIVE /LPFCRYSTALS NEGATIVE MUCOUS THRDS NEGATIVE BACTERIA 
NEGATIVE EPITH CELLS FEW SQUAMOUS /HPFTRICHOMONAS NEGATIVE YEAST NEGATIVE CULT 
SET UP? YES 

 

                          1/3/2017 4:08 PM          COLOR YELLOW APPEARANCE HAZY SPEC GRAV 1.015 pH 6.0 PROTEIN 30

 GLUCOSE NEGATIVE mg/dLKETONE NEGATIVE BILIRUBIN NEGATIVE BLOOD MODERATE NITRITE
 NEGATIVE [place] SCREEN LARGE MICRO INDICATED? SEE BELOW WBC/-200 RBC/HPF 
5-10 CASTS/LPF NEGATIVE /LPFCRYSTALS NEGATIVE MUCOUS THRDS NEGATIVE BACTERIA 
NEGATIVE EPITH CELLS 1+ SQUAMOUS /HPFTRICHOMONAS NEGATIVE YEAST NEGATIVE CULT 
SET UP? YES 

 

                          2017 4:24 PM         CREATININE 1.50 mg/dLAGE 66 GFR NonAA 35 GFR AA 42 eGFR 35 eGFR

 AA* 42 VITAMIN B12 1324.0 pg/mL

 

                          2017 11:30 AM         GLUCOSE 93.0 mg/dLSODIUM 143.0 mmol/LPOTASSIUM 3.10 mmol/LCHLORIDE

 101.0 mmol/LCO2 29.0 mmol/LBUN 26.0 mg/dLCREATININE 1.50 mg/dLSGOT/AST 23.0 
IU/LSGPT/ALT 13.0 IU/LALK PHOS 66.0 IU/LTOTAL PROTEIN 7.70 g/dLALBUMIN 4.30 
g/dLTOTAL BILI 0.40 mg/dLCALCIUM 10.30 mg/dLAGE 66 GFR NonAA 35 GFR AA 42 eGFR 
35 eGFR AA* 42 TRIGLYCERIDES 147.0 mg/dLCHOLESTEROL 184.0 mg/dLHDL 44.0 mg/dLTOT
 CHOL/HDL 4.2 .0 mg/dLWBC 5.4 RBC 3.98 HGB 12.90 g/dLHCT 40.20 %.0
 fLMCH 32.40 pgMCHC 32.10 g/dLRDW SD 50 RDW CV 13.50 %MPV 9.30 fLPLT 210 NRBC# 
0.00 NRBC% 0.0 %NEUT 54.20 %%LYMP 30.70 %%MONO 9.10 %%EOS 5.20 %%BASO 0.40 
%#NEUT 2.94 #LYMP 1.66 #MONO 0.49 #EOS 0.28 #BASO 0.02 MANUAL DIFF NOT IND FREE 
T4 1.09 COLOR YELLOW APPEARANCE CLEAR SPEC GRAV <=1.005 pH 5.5 PROTEIN NEGATIVE 
GLUCOSE NEGATIVE mg/dLKETONE NEGATIVE BILIRUBIN NEGATIVE BLOOD NEGATIVE NITRITE 
NEGATIVE [place] SCREEN LARGE MICRO INDICATED? SEE BELOW WBC/HPF 10-20 RBC/HPF 0-5 
CASTS/LPF NEGATIVE /LPFCRYSTALS NEGATIVE MUCOUS THRDS NEGATIVE BACTERIA FEW 
EPITH CELLS 1+ SQUAMOUS /HPFTRICHOMONAS NEGATIVE YEAST NEGATIVE CULT SET UP? YES
 TSH 1.820 uIU/mL

 

                          2017 2:45 PM         COLOR YELLOW APPEARANCE CLEAR SPEC GRAV <=1.005 pH 6.0 PROTEIN

 NEGATIVE GLUCOSE NEGATIVE mg/dLKETONE NEGATIVE BILIRUBIN NEGATIVE BLOOD TRACE-
INTACT NITRITE NEGATIVE [place] SCREEN SMALL MICRO INDICATED? SEE BELOW WBC/HPF 0-5
 RBC/HPF NEGATIVE CASTS/LPF NEGATIVE /LPFCRYSTALS NEGATIVE MUCOUS THRDS NEGATIVE
 BACTERIA FEW EPITH CELLS FEW SQUAMOUS /HPFTRICHOMONAS NEGATIVE YEAST NEGATIVE 
CULT SET UP? YES 

 

                          2017 11:22 AM        COLOR YELLOW APPEARANCE CLEAR SPEC GRAV <=1.005 pH 6.5 PROTEIN

 NEGATIVE GLUCOSE NEGATIVE mg/dLKETONE NEGATIVE BILIRUBIN NEGATIVE BLOOD 
NEGATIVE NITRITE NEGATIVE LEUK SCREEN NEGATIVE MICRO INDICATED? NOT INDICATED 

 

                          2017 2:18 PM         Clarity Ur cloudy Color Ur dark yellow Glucose Ur-sCnc negative

 Bilirub Ur Ql Strip negative Ketones Ur Ql Strip negative Sp Gr Ur Qn 1.010 Hgb
 Ur Ql Strip trace-intact pH Ur-LsCnc 6.5 Prot Ur Ql Strip trace Urobilinogen 
Ur-mCnc 0.2 Nitrite Ur Ql Strip negative WBC Est Ur Ql Strip large 







History Of Immunizations







       Name    Date Admin    Mfg Name    Mfg Code    Trade Name    Lot#    Route    Inj    Vis Given    Vis

 Pub                                    CVX

 

        Influenza    2008    Not Entered    NE      Not Entered            Not Entered    Not Entered

                    1            999

 

        X       12/19/2008    [employer]    MSD     Pneumovax 23            Intramuscular    Not Entered

                    1            999

 

           Influenza    10/15/2010    [employer]rely.    NOV        Fluvirin > 12 Years    

[id number]     Intramuscular    Left Deltoid    10/15/2010    2009    999

 

          X         3/23/2015    [facility]    [facility]       Prevnar 13    [id number]    Intramuscular

                Right Gluteous Medius    3/23/2015       2013       109







History of Past Illness







                    Name                Date of Onset       Comments

 

                    Peritoneal Neoplasm, Malignant                         

 

                    Hyperlipidemia                           

 

                    Bipolar disorder, unspecified                         

 

                    Artificial opening status; colostomy                         

 

                    B12 deficiency                           

 

                    Anemia, Pernicious                         

 

                    Arthritis unspecified                         

 

                    cervical cancer                          

 

                    Artificial opening status; colostomy    2010  1:10PM     

 

                    Bipolar disorder, unspecified    2010  1:10PM     

 

                    Hyperlipidemia      2010  1:10PM     

 

                    Anemia, Pernicious    2010  1:10PM     

 

                    Postoperative Follow-Up    2010  1:55PM     

 

                    Postoperative Follow-Up    Mar  8 2010 10:57AM     

 

                    Artificial opening status; colostomy    Mar  8 2010  1:19PM     

 

                    Peritoneal Neoplasm, Malignant    Mar  8 2010  1:19PM     

 

                    Artificial opening status; colostomy    2010  1:40PM     

 

                    Hyperlipidemia      2010  1:40PM     

 

                    Anemia, Pernicious    2010  1:40PM     

 

                    Peritoneal Neoplasm, Malignant    2010  1:40PM     

 

                    Arthritis unspecified    2010  1:40PM     

 

                    Anemia of Chronic Illness    2010  1:40PM     

 

                    Tinea corporis      2010  3:17PM     

 

                    Bipolar disorder, unspecified    2010  1:33PM     

 

                    Hyperlipidemia      2010  1:33PM     

 

                    Anemia, Pernicious    2010  1:33PM     

 

                    Peritoneal Neoplasm, Malignant    2010  1:33PM     

 

                    B12 deficiency      2010  1:33PM     

 

                    Ethmoidal Sinusitis, Acute    Sep 21 2010  3:53PM     

 

                    Wheezing            Sep 21 2010  3:53PM     

 

                    Flu                 Oct 15 2010  1:40PM     

 

                    Bipolar disorder, unspecified    Oct 15 2010  1:42PM     

 

                    Hyperlipidemia      Oct 15 2010  1:42PM     

 

                    Anemia, Pernicious    Oct 15 2010  1:42PM     

 

                    Peritoneal Neoplasm, Malignant    Oct 15 2010  1:42PM     

 

                    Bipolar disorder, unspecified    2011 12:01PM     

 

                    Hyperlipidemia      2011 12:01PM     

 

                    Anemia, Pernicious    2011 12:01PM     

 

                    Peritoneal Neoplasm, Malignant    2011 12:01PM     

 

                    Bipolar disorder, unspecified    Apr 15 2011 10:55AM     

 

                    Major Depression    2011 10:11AM     

 

                    Bipolar Disorder    2011 10:11AM     

 

                    Cancer              May 10 2011  4:16PM     

 

                    Major Depression    May 10 2011  3:16PM     

 

                    Bipolar Disorder    May 10 2011  3:16PM     

 

                    Hypercalcemia       May 23 2011  2:47PM     

 

                    Bipolar disorder, unspecified    May 23 2011  2:47PM     

 

                    Colon Cancer, Personal History    May 23 2011  2:47PM     

 

                    Bipolar Disorder    May 31 2011  4:39PM     

 

                    Depressive Disorder    2011 10:01AM     

 

                    Vitamin B12 deficiency    2011 10:01AM     

 

                    Vitamin D Deficiency    2011  5:07PM     

 

                    Anemia, Vitamin B12 Deficiency    2011  5:07PM     

 

                    B12 deficiency      2011  3:56PM     

 

                    Routine gynecological examination    Aug  4 2011  9:08AM     

 

                    Screening Examination for Breast Cancer    Aug  4 2011  9:08AM     

 

                    Tinea Corporis      Aug  4 2011  9:08AM     

 

                    Depressive Disorder    Sep 23 2011  8:47AM     

 

                    Contact Dermatitis    Sep 23 2011  8:47AM     

 

                    Anemia, Pernicious    Sep 23 2011  8:47AM     

 

                    B12 deficiency      Sep 23 2011  8:47AM     

 

                    B12 deficiency      Sep 27 2011  2:58PM     

 

                    B12 deficiency      Oct 20 2011  2:34PM     

 

                    Flu                 Dec  9 2011  3:16PM     

 

                    B12 deficiency      Dec  9 2011  3:17PM     

 

                    B12 deficiency      2012  4:52PM     

 

                    B12 deficiency      2012 11:10AM     

 

                    B12 deficiency      2012  3:37PM     

 

                    B12 deficiency      May  3 2012  4:10PM     

 

                    B12 deficiency      2012  2:54PM     

 

                    B12 deficiency      2012 11:23AM     

 

                    B12 deficiency      Aug  9 2012  2:08PM     

 

                    B12 deficiency      Sep  6 2012  4:36PM     

 

                    B12 deficiency      Oct 16 2012 10:23AM     

 

                    Flu                 Feb  4   3:11PM     

 

                    Bipolar disorder, unspecified    Feb  4   2:48PM     

 

                    Anemia, Pernicious    Feb  4   2:48PM     

 

                    B12 deficiency      Feb  4   2:48PM     

 

                    Extrapyramidal abnormal movement disorder    Feb  4   2:48PM     

 

                    B12 deficiency      Apr  3 2013 12:03PM     

 

                    Bipolar disorder, unspecified    May  7 2013  1:31PM     

 

                    Anemia, Pernicious    May  7 2013  1:31PM     

 

                    B12 deficiency      May  7 2013  1:31PM     

 

                    Extrapyramidal abnormal movement disorder    May  7 2013  1:31PM     

 

                    B12 deficiency      2013  3:42PM     

 

                    B12 deficiency      2013  1:31PM     

 

                    Hyperlipidemia      Aug  7 2013 10:37AM     

 

                    Vitamin D Deficiency    Aug  7 2013 10:37AM     

 

                    Bipolar disorder, unspecified    Aug  7 2013 10:37AM     

 

                    Anemia, Pernicious    Aug  7 2013 10:37AM     

 

                    B12 deficiency      Aug  7 2013 10:37AM     

 

                    B12 deficiency      Sep 25 2013 11:15AM     

 

                    B12 deficiency      Dec 11 2013  3:16PM     

 

                    B12 deficiency      Mar  6 2014  1:48PM     

 

                    B12 deficiency      May 21 2014  3:17PM     

 

                    Screening Examination for Breast Cancer    2014  3:23PM     

 

                    Periumbilical abdominal pain    2014  3:23PM     

 

                    B12 deficiency      Jul 10 2014  2:52PM     

 

                    Anemia, Vitamin B12 Deficiency    Aug 13 2014  4:50PM     

 

                    Bipolar disorder    Oct 16 2014 11:13AM     

 

                    Hyperlipidemia      Oct 16 2014 11:13AM     

 

                    Anemia, Pernicious    Oct 16 2014 11:13AM     

 

                    Peritoneal Neoplasm, Malignant    Oct 16 2014 11:13AM     

 

                    Screening breast examination    Oct 16 2014 11:13AM     

 

                    Weight loss         Oct 16 2014 11:13AM     

 

                    Anemia, Pernicious    Mar 23 2015  2:57PM     

 

                    B12 deficiency      Mar 23 2015  2:57PM     

 

                    Need for Prevnar vaccine    Mar 23 2015  2:57PM     

 

                    Bipolar disorder    Mar 23 2015  2:57PM     

 

                    Hyperlipidemia      Mar 23 2015  2:57PM     

 

                    Anemia, Pernicious    Mar 23 2015  2:57PM     

 

                    Peritoneal Neoplasm, Malignant    Mar 23 2015  2:57PM     

 

                    B12 deficiency      May  4 2015  4:48PM     

 

                    Hyperlipidemia      May 13 2015  9:56AM     

 

                    Anemia              May 13 2015  9:56AM     

 

                    Bipolar disorder    May 13 2015  9:56AM     

 

                    Bipolar disorder    May 14 2015  3:27PM     

 

                    Hyperlipidemia      May 14 2015  3:27PM     

 

                    Anemia, Pernicious    May 14 2015  3:27PM     

 

                    Peritoneal Neoplasm, Malignant    May 14 2015  3:27PM     

 

                    B12 deficiency      2015  2:20PM     

 

                    B12 deficiency      2015 11:34AM     

 

                    B12 deficiency      Aug 18 2015  9:06AM     

 

                    Tinea Corporis      Sep 18 2015  8:54AM     

 

                    B12 deficiency      Sep 18 2015  8:54AM     

 

                    B12 deficiency      2015 10:28AM     

 

                    Herpes zoster without complication    Dec  3 2015  9:52AM     

 

                    B12 deficiency      Dec 23 2015 11:21AM     

 

                    B12 deficiency      2016  4:51PM     

 

                    Vitamin B 12 deficiency    Mar 14 2016  5:35PM     

 

                    B12 deficiency      Mar 15 2016 12:14PM     

 

                    B12 deficiency      May  5 2016 11:30AM     

 

                    Edema               May  5 2016 11:30AM     

 

                    Dermatitis          May  5 2016 11:30AM     

 

                    Edema               May 17 2016  8:38AM     

 

                    Shortness of breath    May 17 2016  8:38AM     

 

                    Bilateral edema of lower extremity    2016  2:06PM     

 

                    B12 deficiency      2016  2:06PM     

 

                    B12 deficiency      2016 11:50AM     

 

                    B12 deficiency      2016 11:20AM     

 

                    Diarrhea            Aug  2 2016  3:13PM     

 

                    B12 deficiency      Aug 24 2016 11:10AM     

 

                    Encounter for screening mammogram for breast cancer    Aug 24 2016 11:44AM     

 

                    B12 deficiency      Sep 28 2016  2:35PM     

 

                    B12 deficiency      Dec 15 2016  2:02PM     

 

                    Dysuria             Dec 29 2016 12:14PM     

 

                    Hematuria           Fidencio  3 2017  1:33PM     

 

                    Constipation by delayed colonic transit    2017  1:52PM     

 

                    Ileus               2017  1:52PM     

 

                    UTI (urinary tract infection)    Fidencio 15 2017  3:39PM     

 

                    Acute cystitis with hematuria    2017 11:07AM     

 

                    B12 deficiency      2017 11:07AM     

 

                    B12 deficiency      2017 11:40AM     

 

                    B12 deficiency      2017  4:07PM     

 

                    Slurred speech      2017  3:07PM     

 

                    Vitamin B12 deficiency    2017  3:07PM     

 

                    Dysphagia, unspecified type    2017  3:07PM     

 

                    Hyperlipidemia      2017  3:07PM     

 

                    Dysuria             2017 12:01PM     

 

                    B12 deficiency      2017  2:08PM     

 

                    Dysuria             2017 10:58AM     

 

                    Hematuria           May 22 2017  1:36PM     

 

                    Depressive Disorder    May 22 2017  1:36PM     

 

                    Constipation        May 22 2017  1:36PM     

 

                    Fatigue             May 22 2017  1:36PM     

 

                    Urinary tract infection    May 30 2017  9:36AM     

 

                    Hypokalemia         May 30 2017 12:03PM     







Payers







           Insurance Name    Company Name    Plan Name    Plan Number    Policy Number    Policy Group

 Number                                 Start Date

 

                    Medicare [employer]    Medicare [facility]              [id number]              N/A

 

                          [employer]    [employer]                 [id number]

                                                    

 

                    Medicare Part A    Medicare - Lab/Xray              [id number]              2006

 

                    Medicare Part B    [employer] [place]              [id number]              2006

 

                          [employer]    [employer]                 50 percent

                                                    2009

 

                    [facility]              [id number]              N/A

 

                    Medicare Part A    Medicare Part A              [id number]              N/A

 

                    Medicare Part A    Medicare Part A              [id number]              2006









History of Encounters







                    Visit Date          Visit Type          Provider

 

                    2017           Office visit        Bhupinder Aspen DO

 

                    2017           Nurse visit         Bhupinder Aspen DO

 

                    2017           Office visit         

 

                    2017           Office visit        Bhupinder Aspen DO

 

                    2017           Nurse visit         Bhupinder Aspen DO

 

                    2017           Office visit        Radha Otniveros APRN

 

                    1/15/2017           Office visit        Aj Tapia NP

 

                    2017            Office visit        Devin Masterson MD

 

                    2016          [facility]            Devin Masterson MD

 

                    12/15/2016          Nurse visit         Bhupinder Aspen DO

 

                    2016           Nurse visit         Bret Forte APRN

 

                    2016           Nurse visit         Bhupinder Aspen DO

 

                    2016            Office visit        Bhupinder Aspen DO

 

                    2016           Nurse visit         Bhupinder Aspen DO

 

                    2016           Office visit        Bret Forte APRN

 

                    2016            Office visit        Bhupinder Aspen DO

 

                    3/15/2016           Nurse visit         Bhupinder Aspen DO

 

                    2016            Nurse visit         Bhupinder Aspen DO

 

                    2015          Nurse visit         Bhupinder Aspen DO

 

                    12/3/2015           Office visit        Bhupinder Aspen DO

 

                    2015          Nurse visit         Bhupinder Aspen DO

 

                    2015           Office visit        Bhupinder Aspen DO

 

                    2015           Nurse visit         Bhupinder Aspen DO

 

                    2015            Nurse visit         Bhupinder Aspen DO

 

                    2015            Nurse visit         Bhupinder Aspen DO

 

                    2015           Office visit        Bhupinder Aspen DO

 

                    2015            Nurse visit         Bhupinder Aspen DO

 

                    3/23/2015           Office visit        Bhupinder Aspen DO

 

                    10/16/2014          Office visit        Bhupinder Aspen DO

 

                    2014           Nurse visit         Radha Ontiveros APRN

 

                    7/10/2014           Nurse visit         Bhupinder Aspen DO

 

                    2014           Office visit        Bhupinder Aspen DO

 

                    2014           Nurse visit         Bhupinder Aspen DO

 

                    3/6/2014            Nurse visit         Bhupinder Aspen DO

 

                    2014            [facility]            EARNEST Lopez MD

 

                    2013          Nurse visit         Bhupinder Aspen DO

 

                    2013           Nurse visit         Bhupinder Aspen DO

 

                    2013            Office visit        Bhupinder Aspen DO

 

                    2013            Nurse visit         Bhupinder Aspen DO

 

                    2013            Nurse visit         Bhupinder Aspen DO

 

                    2013            Office visit        Bhupinder Aspen DO

 

                    4/3/2013            Nurse visit         Bhupinder Aspen DO

 

                    2013            Office visit        Bhupinder Aspen DO

 

                    10/16/2012          Nurse visit         Bhupinder Aspen DO

 

                    2012            Nurse visit         Bhupinder Aspen DO

 

                    2012            Voided              Bhupinder Aspen DO

 

                    2012            Nurse visit         Bhupinder Aspen DO

 

                    2012            Nurse visit         Bhupinder Aspen DO

 

                    2012           Nurse visit         Bhupinder Aspen DO

 

                    5/3/2012            Nurse visit         Bhupinder Aspen DO

 

                    2012           Nurse visit         Bhupidner Aspen DO

 

                    2012           Nurse visit         Bhupinder Aspen DO

 

                    2012           Nurse visit         Bhupinder Aspen DO

 

                    2011           Nurse visit         Bhupinder Aspen DO

 

                    10/20/2011          Nurse visit         Bhupinder Aspen DO

 

                    2011           Office visit        Bhupinder Aspen DO

 

                    2011           Nurse visit         Radha GREEN

 

                    2011            Office visit        Bhupinder Aspen DO

 

                    2011           Nurse visit         Bhupinder Aspen DO

 

                    2011            Office visit        Bhupinder Aspen DO

 

                    2011           Office visit        Bhupinder Aspen DO

 

                    5/10/2011           Office visit        Bhupinder Aspen DO

 

                    2011           Office visit        Bhupinder Aspen DO

 

                    4/15/2011           Office visit        Devin Angel DO

 

                    2011           Office visit        Devin Angel DO

 

                    10/15/2010          Office visit        Devin Angel DO

 

                    2010           Office visit        Devin Angel DO

 

                    2010            Office visit        Devin Angel DO

 

                    2010           Office visit        Devin Angel DO

 

                    2010            Office visit        Devin Angel DO

 

                    3/8/2010            Office visit        Devin Masterson MD

 

                    2010            Surgery             Devin Masterson MD

 

                    2010            Office visit        Devin Angel DO

 

                    2010           Surgery             Devin Masterson MD

 

                    2010           Hospital            Devin Masterson MD

 

                    2010           [facility]            Devin Masterson MD

 

                    10/22/2009          Office visit        Devin Angel DO